# Patient Record
Sex: FEMALE | Race: WHITE | URBAN - METROPOLITAN AREA
[De-identification: names, ages, dates, MRNs, and addresses within clinical notes are randomized per-mention and may not be internally consistent; named-entity substitution may affect disease eponyms.]

---

## 2019-09-25 ENCOUNTER — INPATIENT (INPATIENT)
Facility: HOSPITAL | Age: 69
LOS: 6 days | Discharge: NURSING, REHAB OR LONG-TERM CARE | End: 2019-10-02
Attending: INTERNAL MEDICINE | Admitting: INTERNAL MEDICINE
Payer: COMMERCIAL

## 2019-09-25 VITALS
HEART RATE: 72 BPM | RESPIRATION RATE: 18 BRPM | DIASTOLIC BLOOD PRESSURE: 96 MMHG | WEIGHT: 250 LBS | SYSTOLIC BLOOD PRESSURE: 206 MMHG | OXYGEN SATURATION: 98 % | TEMPERATURE: 100 F

## 2019-09-25 LAB
ALBUMIN SERPL ELPH-MCNC: 3.9 G/DL — SIGNIFICANT CHANGE UP (ref 3.5–5.2)
ALP SERPL-CCNC: 89 U/L — SIGNIFICANT CHANGE UP (ref 30–115)
ALT FLD-CCNC: 16 U/L — SIGNIFICANT CHANGE UP (ref 0–41)
ANION GAP SERPL CALC-SCNC: 10 MMOL/L — SIGNIFICANT CHANGE UP (ref 7–14)
APTT BLD: 37.5 SEC — SIGNIFICANT CHANGE UP (ref 27–39.2)
AST SERPL-CCNC: 16 U/L — SIGNIFICANT CHANGE UP (ref 0–41)
BASE EXCESS BLDV CALC-SCNC: 3.4 MMOL/L — HIGH (ref -2–2)
BASOPHILS # BLD AUTO: 0.06 K/UL — SIGNIFICANT CHANGE UP (ref 0–0.2)
BASOPHILS NFR BLD AUTO: 0.6 % — SIGNIFICANT CHANGE UP (ref 0–1)
BILIRUB SERPL-MCNC: 1.2 MG/DL — SIGNIFICANT CHANGE UP (ref 0.2–1.2)
BUN SERPL-MCNC: 11 MG/DL — SIGNIFICANT CHANGE UP (ref 10–20)
CA-I SERPL-SCNC: 1.3 MMOL/L — SIGNIFICANT CHANGE UP (ref 1.12–1.3)
CALCIUM SERPL-MCNC: 9.8 MG/DL — SIGNIFICANT CHANGE UP (ref 8.5–10.1)
CHLORIDE SERPL-SCNC: 106 MMOL/L — SIGNIFICANT CHANGE UP (ref 98–110)
CO2 SERPL-SCNC: 27 MMOL/L — SIGNIFICANT CHANGE UP (ref 17–32)
CREAT SERPL-MCNC: 0.7 MG/DL — SIGNIFICANT CHANGE UP (ref 0.7–1.5)
EOSINOPHIL # BLD AUTO: 0.26 K/UL — SIGNIFICANT CHANGE UP (ref 0–0.7)
EOSINOPHIL NFR BLD AUTO: 2.8 % — SIGNIFICANT CHANGE UP (ref 0–8)
GAS PNL BLDV: 144 MMOL/L — SIGNIFICANT CHANGE UP (ref 136–145)
GAS PNL BLDV: SIGNIFICANT CHANGE UP
GLUCOSE SERPL-MCNC: 133 MG/DL — HIGH (ref 70–99)
HCO3 BLDV-SCNC: 29 MMOL/L — SIGNIFICANT CHANGE UP (ref 22–29)
HCT VFR BLD CALC: 37.4 % — SIGNIFICANT CHANGE UP (ref 37–47)
HCT VFR BLDA CALC: 36.7 % — SIGNIFICANT CHANGE UP (ref 34–44)
HGB BLD CALC-MCNC: 12 G/DL — LOW (ref 14–18)
HGB BLD-MCNC: 11.5 G/DL — LOW (ref 12–16)
HOROWITZ INDEX BLDV+IHG-RTO: 21 — SIGNIFICANT CHANGE UP
IMM GRANULOCYTES NFR BLD AUTO: 0.4 % — HIGH (ref 0.1–0.3)
INR BLD: 1.41 RATIO — HIGH (ref 0.65–1.3)
LACTATE BLDV-MCNC: 1.6 MMOL/L — SIGNIFICANT CHANGE UP (ref 0.5–1.6)
LYMPHOCYTES # BLD AUTO: 1.48 K/UL — SIGNIFICANT CHANGE UP (ref 1.2–3.4)
LYMPHOCYTES # BLD AUTO: 15.7 % — LOW (ref 20.5–51.1)
MCHC RBC-ENTMCNC: 26 PG — LOW (ref 27–31)
MCHC RBC-ENTMCNC: 30.7 G/DL — LOW (ref 32–37)
MCV RBC AUTO: 84.6 FL — SIGNIFICANT CHANGE UP (ref 81–99)
MONOCYTES # BLD AUTO: 0.58 K/UL — SIGNIFICANT CHANGE UP (ref 0.1–0.6)
MONOCYTES NFR BLD AUTO: 6.1 % — SIGNIFICANT CHANGE UP (ref 1.7–9.3)
NEUTROPHILS # BLD AUTO: 7.03 K/UL — HIGH (ref 1.4–6.5)
NEUTROPHILS NFR BLD AUTO: 74.4 % — SIGNIFICANT CHANGE UP (ref 42.2–75.2)
NRBC # BLD: 0 /100 WBCS — SIGNIFICANT CHANGE UP (ref 0–0)
PCO2 BLDV: 49 MMHG — SIGNIFICANT CHANGE UP (ref 41–51)
PH BLDV: 7.38 — SIGNIFICANT CHANGE UP (ref 7.26–7.43)
PLATELET # BLD AUTO: 233 K/UL — SIGNIFICANT CHANGE UP (ref 130–400)
PO2 BLDV: 41 MMHG — HIGH (ref 20–40)
POTASSIUM BLDV-SCNC: 3.7 MMOL/L — SIGNIFICANT CHANGE UP (ref 3.3–5.6)
POTASSIUM SERPL-MCNC: 3.8 MMOL/L — SIGNIFICANT CHANGE UP (ref 3.5–5)
POTASSIUM SERPL-SCNC: 3.8 MMOL/L — SIGNIFICANT CHANGE UP (ref 3.5–5)
PROT SERPL-MCNC: 6.4 G/DL — SIGNIFICANT CHANGE UP (ref 6–8)
PROTHROM AB SERPL-ACNC: 16.2 SEC — HIGH (ref 9.95–12.87)
RBC # BLD: 4.42 M/UL — SIGNIFICANT CHANGE UP (ref 4.2–5.4)
RBC # FLD: 14.3 % — SIGNIFICANT CHANGE UP (ref 11.5–14.5)
SAO2 % BLDV: 73 % — SIGNIFICANT CHANGE UP
SODIUM SERPL-SCNC: 143 MMOL/L — SIGNIFICANT CHANGE UP (ref 135–146)
WBC # BLD: 9.45 K/UL — SIGNIFICANT CHANGE UP (ref 4.8–10.8)
WBC # FLD AUTO: 9.45 K/UL — SIGNIFICANT CHANGE UP (ref 4.8–10.8)

## 2019-09-25 PROCEDURE — 99285 EMERGENCY DEPT VISIT HI MDM: CPT

## 2019-09-25 RX ORDER — SODIUM CHLORIDE 9 MG/ML
1000 INJECTION INTRAMUSCULAR; INTRAVENOUS; SUBCUTANEOUS ONCE
Refills: 0 | Status: COMPLETED | OUTPATIENT
Start: 2019-09-25 | End: 2019-09-25

## 2019-09-25 RX ORDER — PIPERACILLIN AND TAZOBACTAM 4; .5 G/20ML; G/20ML
3.38 INJECTION, POWDER, LYOPHILIZED, FOR SOLUTION INTRAVENOUS ONCE
Refills: 0 | Status: COMPLETED | OUTPATIENT
Start: 2019-09-25 | End: 2019-09-25

## 2019-09-25 RX ADMIN — SODIUM CHLORIDE 1000 MILLILITER(S): 9 INJECTION INTRAMUSCULAR; INTRAVENOUS; SUBCUTANEOUS at 22:14

## 2019-09-25 RX ADMIN — PIPERACILLIN AND TAZOBACTAM 200 GRAM(S): 4; .5 INJECTION, POWDER, LYOPHILIZED, FOR SOLUTION INTRAVENOUS at 22:58

## 2019-09-25 NOTE — ED ADULT TRIAGE NOTE - CHIEF COMPLAINT QUOTE
BIBA via [facility] from home, pt states hernia repair in 2011 on right side of abdomen, wound did not heal, repeat surgery in 2013 because it didn't heal, pt states wounds never healed since 2013, denies fever. Son states that she lives alone, son noticed that he noticed the area and she has been hiding it from family because of fear of further treatment to area.

## 2019-09-26 DIAGNOSIS — Z90.49 ACQUIRED ABSENCE OF OTHER SPECIFIED PARTS OF DIGESTIVE TRACT: Chronic | ICD-10-CM

## 2019-09-26 DIAGNOSIS — Z98.890 OTHER SPECIFIED POSTPROCEDURAL STATES: Chronic | ICD-10-CM

## 2019-09-26 DIAGNOSIS — K63.2 FISTULA OF INTESTINE: ICD-10-CM

## 2019-09-26 PROCEDURE — 99223 1ST HOSP IP/OBS HIGH 75: CPT | Mod: AI

## 2019-09-26 PROCEDURE — 74177 CT ABD & PELVIS W/CONTRAST: CPT | Mod: 26

## 2019-09-26 RX ORDER — AMLODIPINE BESYLATE 2.5 MG/1
5 TABLET ORAL ONCE
Refills: 0 | Status: COMPLETED | OUTPATIENT
Start: 2019-09-26 | End: 2019-09-26

## 2019-09-26 RX ORDER — INFLUENZA VIRUS VACCINE 15; 15; 15; 15 UG/.5ML; UG/.5ML; UG/.5ML; UG/.5ML
0.5 SUSPENSION INTRAMUSCULAR ONCE
Refills: 0 | Status: COMPLETED | OUTPATIENT
Start: 2019-09-26 | End: 2019-10-02

## 2019-09-26 RX ORDER — METOPROLOL TARTRATE 50 MG
50 TABLET ORAL DAILY
Refills: 0 | Status: DISCONTINUED | OUTPATIENT
Start: 2019-09-26 | End: 2019-09-29

## 2019-09-26 RX ORDER — AMLODIPINE BESYLATE 2.5 MG/1
5 TABLET ORAL DAILY
Refills: 0 | Status: DISCONTINUED | OUTPATIENT
Start: 2019-09-26 | End: 2019-09-26

## 2019-09-26 RX ORDER — HYDROCHLOROTHIAZIDE 25 MG
12.5 TABLET ORAL DAILY
Refills: 0 | Status: DISCONTINUED | OUTPATIENT
Start: 2019-09-26 | End: 2019-09-30

## 2019-09-26 RX ORDER — RIVAROXABAN 15 MG-20MG
20 KIT ORAL
Refills: 0 | Status: DISCONTINUED | OUTPATIENT
Start: 2019-09-26 | End: 2019-09-29

## 2019-09-26 RX ORDER — ACETAMINOPHEN 500 MG
650 TABLET ORAL EVERY 6 HOURS
Refills: 0 | Status: DISCONTINUED | OUTPATIENT
Start: 2019-09-26 | End: 2019-09-30

## 2019-09-26 RX ORDER — AMLODIPINE BESYLATE 2.5 MG/1
10 TABLET ORAL DAILY
Refills: 0 | Status: DISCONTINUED | OUTPATIENT
Start: 2019-09-26 | End: 2019-09-27

## 2019-09-26 RX ORDER — PANTOPRAZOLE SODIUM 20 MG/1
40 TABLET, DELAYED RELEASE ORAL
Refills: 0 | Status: DISCONTINUED | OUTPATIENT
Start: 2019-09-26 | End: 2019-09-30

## 2019-09-26 RX ADMIN — Medication 650 MILLIGRAM(S): at 14:06

## 2019-09-26 RX ADMIN — Medication 12.5 MILLIGRAM(S): at 14:07

## 2019-09-26 RX ADMIN — Medication 50 MILLIGRAM(S): at 14:07

## 2019-09-26 RX ADMIN — AMLODIPINE BESYLATE 5 MILLIGRAM(S): 2.5 TABLET ORAL at 14:07

## 2019-09-26 RX ADMIN — Medication 650 MILLIGRAM(S): at 12:32

## 2019-09-26 RX ADMIN — AMLODIPINE BESYLATE 5 MILLIGRAM(S): 2.5 TABLET ORAL at 20:32

## 2019-09-26 RX ADMIN — RIVAROXABAN 20 MILLIGRAM(S): KIT at 18:08

## 2019-09-26 NOTE — H&P ADULT - ASSESSMENT
A/P:    1. Right flank open wound, infected  -Possibly the beginning of a enterocutaneous fistula  -Surgery consult   -IV ABXs (Flagyl/Cipro)  -Wound care    2. Elevated BP  -The pt states she was never diagnosed with HTN  -Start Amlodipine 5 mg Daily     GI PPX  DVT PPX: Hold

## 2019-09-26 NOTE — ED PROVIDER NOTE - NS ED ROS FT
Constitutional: no fever, chills, no recent weight loss, change in appetite or malaise  Eyes: no redness/discharge/pain/vision changes  ENT: no rhinorrhea/ear pain/sore throat  Cardiac: No chest pain, SOB or edema.  Respiratory: No cough or respiratory distress  GI: No nausea, vomiting, diarrhea or abdominal pain.  : No dysuria, frequency, urgency or hematuria  MS: no pain to back or extremities, no loss of ROM, no weakness  Neuro: No headache or weakness. No LOC.  Skin: see HPI  Endocrine: + diabetes.

## 2019-09-26 NOTE — H&P ADULT - NSHPLABSRESULTS_GEN_ALL_CORE
11.5   9.45  )-----------( 233      ( 25 Sep 2019 22:15 )             37.4     09-25    143  |  106  |  11  ----------------------------<  133<H>  3.8   |  27  |  0.7    Ca    9.8      25 Sep 2019 22:15    TPro  6.4  /  Alb  3.9  /  TBili  1.2  /  DBili  x   /  AST  16  /  ALT  16  /  AlkPhos  89  09-25      PT/INR - ( 25 Sep 2019 22:15 )   PT: 16.20 sec;   INR: 1.41 ratio    PTT - ( 25 Sep 2019 22:15 )  PTT:37.5 sec      EXAM:  CT ABDOMEN AND PELVIS IC       PROCEDURE DATE:  09/26/2019      IMPRESSION:     Inflammatory changes noted along the right anterior abdomen and right   flank. 3.1 cm more focal area of fluid with enhancing along the right   flank which may represent an early abscess.    Loop of colon closely adjacent with the previously noted inflammatory   changes with suggestion of a possible enterocutaneous fistula along the   right flank.

## 2019-09-26 NOTE — H&P ADULT - NSHPSOCIALHISTORY_GEN_ALL_CORE
SHX:  -Denies any tobacco, EtOH, or drug use  -Lives at home with family  -Ambulates without cane/walker at home

## 2019-09-26 NOTE — H&P ADULT - NSHPPHYSICALEXAM_GEN_ALL_CORE
Vital Signs Last 24 Hrs  T(C): 37.9 (25 Sep 2019 21:24), Max: 37.9 (25 Sep 2019 21:24)  T(F): 100.2 (25 Sep 2019 21:24), Max: 100.2 (25 Sep 2019 21:24)  HR: 72 (25 Sep 2019 20:29) (72 - 72)  BP: 206/96 (25 Sep 2019 20:29) (206/96 - 206/96)  BP(mean): --  RR: 18 (25 Sep 2019 20:29) (18 - 18)  SpO2: 98% (25 Sep 2019 20:29) (98% - 98%)    General: WN/WD NAD  Neurology: A&Ox3, nonfocal, SEPULVEDA x 4  Eyes: PERRL/EOMI  ENT/Neck: Neck supple, trachea midline, No JVD  Respiratory: CTA B/L, No wheezing, rales, rhonchi  CV: RRR, S1S2, No M/G/R  Abdominal: Right flank with erythema and redness. Open wound that seems infected. + purulence  Extremities: No edema, + peripheral pulses  Skin: No Rashes, Hematoma, Ecchymosis

## 2019-09-26 NOTE — ED PROVIDER NOTE - PHYSICAL EXAMINATION
CONSTITUTIONAL: chronic ill female; in no apparent distress.   EYES: PERRL; EOM intact.   CARDIOVASCULAR: Normal S1, S2; no murmurs, rubs, or gallops.   RESPIRATORY: Normal chest excursion with respiration; breath sounds clear and equal bilaterally; no wheezes, rhonchi, or rales.  GI/: obese round and soft abdomen. Normal bowel sounds; non-distended; non-tender; no palpable organomegaly.   MS: No evidence of trauma or deformity.   SKIN:+ 2 wounds (small 1 cm) noted on RUQ region and 1 wound (small 0.5cm) noted from the middle of chest with purulent drainage and surrounding erythema. + induration around the RUQ wounds with ttp.   NEURO/PSYCH: A & O x 4; grossly unremarkable.

## 2019-09-26 NOTE — CONSULT NOTE ADULT - SUBJECTIVE AND OBJECTIVE BOX
67 yo female hx of DM/HTN/obesity/hx of hernia repair in 2011 and revised in 2013 ([place], [place]) present c/o wound check. as per son, the wound hasn't been healing right since 2013. Patient didn't want to get it check over the years. son brought her to ED for evaluation 2/2 purulent drainage from wound and increasing pain and new draining site her chest and above the original wound so he decided to bring patient to Ed for evaluation. Denies fever/chill/HA/dizziness/chest pain/palpitation/sob/abd pain/n/v/d/ black stool/bloody stool/urinary sxs    Vital Signs Last 24 Hrs  T(C): 37.9 (25 Sep 2019 21:24), Max: 37.9 (25 Sep 2019 21:24)  T(F): 100.2 (25 Sep 2019 21:24), Max: 100.2 (25 Sep 2019 21:24)  HR: 80 (25 Sep 2019 22:11) (72 - 80)  BP: 160/94 (25 Sep 2019 22:11) (160/94 - 206/96)  BP(mean): --  RR: 18 (25 Sep 2019 22:11) (18 - 18)  SpO2: 98% (25 Sep 2019 22:11) (98% - 98%)    PHYSICAL EXAM:      Constitutional: NAD, A&O x3    Eyes: PERRLA, no conjuctivitis    Neck: no lymphadenopathy    Respiratory: +air entry, no rales, no rhonchi, no wheezes    Cardiovascular: +S1 and S2, regular rate and rhythm    Gastrointestinal: +BS, soft, non-tender, not distended    Extremities:  no edema, no calf tenderness      Neurological: sensation intact, ROM equal B/L, CN II-XII intact    Skin: no rashes, normal turgor                              11.5   9.45  )-----------( 233      ( 25 Sep 2019 22:15 )             37.4     09-25    143  |  106  |  11  ----------------------------<  133<H>  3.8   |  27  |  0.7    Ca    9.8      25 Sep 2019 22:15    TPro  6.4  /  Alb  3.9  /  TBili  1.2  /  DBili  x   /  AST  16  /  ALT  16  /  AlkPhos  89  09-25            PT/INR - ( 25 Sep 2019 22:15 )   PT: 16.20 sec;   INR: 1.41 ratio         PTT - ( 25 Sep 2019 22:15 )  PTT:37.5 sec      CAPILLARY BLOOD GLUCOSE        < from: CT Abdomen and Pelvis w/ IV Cont (09.26.19 @ 02:00) >  IMPRESSION:     Inflammatory changes noted along the right anterior abdomen and right   flank. 3.1 cm more focal area of fluid with enhancing along the right   flank which may represent an early abscess.    Loop of colon closely adjacent with the previously noted inflammatory   changes with suggestion of a possible enterocutaneous fistula along the   right flank.    < end of copied text > 67 yo female hx of DM, HTN, A- fib on xarelto, obesity, hx of hernia repair in 2011 and revised in 2013 ([place], [place]) present c/o wound check. as per son, the wound hasn't been healing right since 2013. Patient didn't want to get it check over the years. son brought her to ED for evaluation 2/2 purulent drainage from wound and increasing pain and new draining site her chest and above the original wound so he decided to bring patient to Ed for evaluation. Denies fever/chill/HA/dizziness/chest pain/palpitation/sob/abd pain/n/v/d/ black stool/bloody stool/urinary sxs    Vital Signs Last 24 Hrs  T(C): 37.9 (25 Sep 2019 21:24), Max: 37.9 (25 Sep 2019 21:24)  T(F): 100.2 (25 Sep 2019 21:24), Max: 100.2 (25 Sep 2019 21:24)  HR: 80 (25 Sep 2019 22:11) (72 - 80)  BP: 160/94 (25 Sep 2019 22:11) (160/94 - 206/96)  BP(mean): --  RR: 18 (25 Sep 2019 22:11) (18 - 18)  SpO2: 98% (25 Sep 2019 22:11) (98% - 98%)    PHYSICAL EXAM:      Constitutional: NAD, A&O x3    Eyes: PERRLA, no conjuctivitis    Neck: no lymphadenopathy    Respiratory: +air entry, no rales, no rhonchi, no wheezes    Cardiovascular: +S1 and S2, regular rate and rhythm    Gastrointestinal: +BS, soft, non-tender, not distended    Extremities:  no edema, no calf tenderness      Neurological: sensation intact, ROM equal B/L, CN II-XII intact    Skin: no rashes, normal turgor                              11.5   9.45  )-----------( 233      ( 25 Sep 2019 22:15 )             37.4     09-25    143  |  106  |  11  ----------------------------<  133<H>  3.8   |  27  |  0.7    Ca    9.8      25 Sep 2019 22:15    TPro  6.4  /  Alb  3.9  /  TBili  1.2  /  DBili  x   /  AST  16  /  ALT  16  /  AlkPhos  89  09-25            PT/INR - ( 25 Sep 2019 22:15 )   PT: 16.20 sec;   INR: 1.41 ratio         PTT - ( 25 Sep 2019 22:15 )  PTT:37.5 sec      CAPILLARY BLOOD GLUCOSE        < from: CT Abdomen and Pelvis w/ IV Cont (09.26.19 @ 02:00) >  IMPRESSION:     Inflammatory changes noted along the right anterior abdomen and right   flank. 3.1 cm more focal area of fluid with enhancing along the right   flank which may represent an early abscess.    Loop of colon closely adjacent with the previously noted inflammatory   changes with suggestion of a possible enterocutaneous fistula along the   right flank.    < end of copied text > 69 yo female hx of DM, HTN, A- fib on xarelto, obesity, hx of hernia repair in 2011 and revised in 2013 ([place], [place]) brought to the ED by son for  wound check. As per son, the wound hasn't been healing right since 2013. Patient last saw her surgeon last early 2014 and  didn't want to go back. son brought her to ED for evaluation due purulent drainage from wound. Denies fever/chill/HA/dizziness/chest pain/palpitation/sob/abd pain/n/v/d/ black stool/bloody stool/urinary symptoms.    Vital Signs Last 24 Hrs  T(C): 37.9 (25 Sep 2019 21:24), Max: 37.9 (25 Sep 2019 21:24)  T(F): 100.2 (25 Sep 2019 21:24), Max: 100.2 (25 Sep 2019 21:24)  HR: 80 (25 Sep 2019 22:11) (72 - 80)  BP: 160/94 (25 Sep 2019 22:11) (160/94 - 206/96)  BP(mean): --  RR: 18 (25 Sep 2019 22:11) (18 - 18)  SpO2: 98% (25 Sep 2019 22:11) (98% - 98%)    PHYSICAL EXAM:      Constitutional: NAD, A&O x3    Eyes: PERRLA, no conjuctivitis    Neck: no lymphadenopathy    Respiratory: +air entry, no rales, no rhonchi, no wheezes    Cardiovascular: +S1 and S2, regular rate and rhythm    Gastrointestinal: +BS, soft, (+) 3 openings at right flank and 1 opening at epigastric area with trace of drainage in dressing, (+) indurated area at right flank with redness,  non-tender, non distended    Extremities:  no edema, no calf tenderness      Neurological: sensation intact, ROM equal B/L, CN II-XII intact    Skin: no rashes, normal turgor                              11.5   9.45  )-----------( 233      ( 25 Sep 2019 22:15 )             37.4     09-25    143  |  106  |  11  ----------------------------<  133<H>  3.8   |  27  |  0.7    Ca    9.8      25 Sep 2019 22:15    TPro  6.4  /  Alb  3.9  /  TBili  1.2  /  DBili  x   /  AST  16  /  ALT  16  /  AlkPhos  89  09-25            PT/INR - ( 25 Sep 2019 22:15 )   PT: 16.20 sec;   INR: 1.41 ratio         PTT - ( 25 Sep 2019 22:15 )  PTT:37.5 sec      CAPILLARY BLOOD GLUCOSE        < from: CT Abdomen and Pelvis w/ IV Cont (09.26.19 @ 02:00) >  IMPRESSION:     Inflammatory changes noted along the right anterior abdomen and right   flank. 3.1 cm more focal area of fluid with enhancing along the right   flank which may represent an early abscess.    Loop of colon closely adjacent with the previously noted inflammatory   changes with suggestion of a possible enterocutaneous fistula along the   right flank.    < end of copied text >

## 2019-09-26 NOTE — CONSULT NOTE ADULT - ATTENDING COMMENTS
above noted abdomen soft dressing changed multiple sinus tract discussed case with pt and familly pt fully examined by will need dye study through sinus tracts

## 2019-09-26 NOTE — CHART NOTE - NSCHARTNOTEFT_GEN_A_CORE
Patient seen at bedside - resting comfortably.    Med rec completed on patient this AM. She is feeling better at this time.     Patient without questions or concerns at this time.     Patient encouraged to contact PA with any further questions or concerns.

## 2019-09-26 NOTE — ED PROVIDER NOTE - CLINICAL SUMMARY MEDICAL DECISION MAKING FREE TEXT BOX
68yF pw cellulitis and CT with Inflammatory changes noted along the right anterior abdomen and right   	flank. 3.1 cm more focal area of fluid with enhancing along the right   	flank which may represent an early abscess. Loop of colon closely adjacent with the previously noted inflammatory changes with suggestion of a possible enterocutaneous fistula along the  right flank.  Labs wnl - zosyn given,   surgeyr consulted -  pt admitted

## 2019-09-26 NOTE — ED ADULT NURSE NOTE - CHIEF COMPLAINT QUOTE
BIBA via [facility] from home, pt states hernia repair in 2011 on right side of abdomen, wound did not heal, repeat surgery in 2013 because it didn't heal, pt states wounds never healed since 2013, denies fever. Son states that she lives alone, son noticed that he noticed the area and she has been hiding it from family because of fear of further treatment to area.

## 2019-09-26 NOTE — ED PROVIDER NOTE - OBJECTIVE STATEMENT
69 yo female hx of DM/HTN/obesity/hx of hernia repair in 2011 and revised in 2013 ([place], [place]) present c/o wound check. as per son, patient didn't want to get it check over the years. son brought her to ED for evaluation 2/2 purulent drainage from wound and increasing pain. 69 yo female hx of DM/HTN/obesity/hx of hernia repair in 2011 and revised in 2013 ([place], [place]) present c/o wound check. as per son, the wound hasn't been healing right since 2013. Patient didn't want to get it check over the years. son brought her to ED for evaluation 2/2 purulent drainage from wound and increasing pain and new draining site her chest and above the original wound so he decided to bring patient to Ed for evaluation. Denies fever/chill/HA/dizziness/chest pain/palpitation/sob/abd pain/n/v/d/ black stool/bloody stool/urinary sxs

## 2019-09-26 NOTE — H&P ADULT - HISTORY OF PRESENT ILLNESS
69 YO F with a PMH of hernia repair who presents to the hospital for eval of right-sided flank pain for the past x 5 years. Progressively worsening. 67 YO F with a PMH of hernia repair who presents to the hospital for eval of right-sided flank pain for the past x 5 years. Progressively worsening. Associated with purulent discharge and a redness and swelling over the flank. Of note, the pt states that she had a hernia repair in the same area and that she never followed up with her surgeon because she thought they were making things worse. Denies any fevers, chills, cough, CP, SOB, dysuria, or constipation.     In the ED, the pt had a CT-AP performed which showed a possible enterocutaneous fistula

## 2019-09-26 NOTE — PATIENT PROFILE ADULT - DO YOU FEEL UNSAFE AT SCHOOL?
Pt states that new medication for bladder issues is working well   Pt wanted DR to know    Pt at [phone]   not applicable

## 2019-09-27 LAB
ANION GAP SERPL CALC-SCNC: 10 MMOL/L — SIGNIFICANT CHANGE UP (ref 7–14)
BUN SERPL-MCNC: 8 MG/DL — LOW (ref 10–20)
CALCIUM SERPL-MCNC: 9.8 MG/DL — SIGNIFICANT CHANGE UP (ref 8.5–10.1)
CHLORIDE SERPL-SCNC: 104 MMOL/L — SIGNIFICANT CHANGE UP (ref 98–110)
CO2 SERPL-SCNC: 29 MMOL/L — SIGNIFICANT CHANGE UP (ref 17–32)
CREAT SERPL-MCNC: 0.6 MG/DL — LOW (ref 0.7–1.5)
GLUCOSE BLDC GLUCOMTR-MCNC: 87 MG/DL — SIGNIFICANT CHANGE UP (ref 70–99)
GLUCOSE BLDC GLUCOMTR-MCNC: 98 MG/DL — SIGNIFICANT CHANGE UP (ref 70–99)
GLUCOSE SERPL-MCNC: 96 MG/DL — SIGNIFICANT CHANGE UP (ref 70–99)
HCT VFR BLD CALC: 39.2 % — SIGNIFICANT CHANGE UP (ref 37–47)
HCV AB S/CO SERPL IA: 0.08 S/CO — SIGNIFICANT CHANGE UP (ref 0–0.99)
HCV AB SERPL-IMP: SIGNIFICANT CHANGE UP
HGB BLD-MCNC: 12 G/DL — SIGNIFICANT CHANGE UP (ref 12–16)
INR BLD: 1.79 RATIO — HIGH (ref 0.65–1.3)
MCHC RBC-ENTMCNC: 25.8 PG — LOW (ref 27–31)
MCHC RBC-ENTMCNC: 30.6 G/DL — LOW (ref 32–37)
MCV RBC AUTO: 84.3 FL — SIGNIFICANT CHANGE UP (ref 81–99)
NRBC # BLD: 0 /100 WBCS — SIGNIFICANT CHANGE UP (ref 0–0)
PLATELET # BLD AUTO: 245 K/UL — SIGNIFICANT CHANGE UP (ref 130–400)
POTASSIUM SERPL-MCNC: 3.5 MMOL/L — SIGNIFICANT CHANGE UP (ref 3.5–5)
POTASSIUM SERPL-SCNC: 3.5 MMOL/L — SIGNIFICANT CHANGE UP (ref 3.5–5)
PROTHROM AB SERPL-ACNC: 20.5 SEC — HIGH (ref 9.95–12.87)
RBC # BLD: 4.65 M/UL — SIGNIFICANT CHANGE UP (ref 4.2–5.4)
RBC # FLD: 14.2 % — SIGNIFICANT CHANGE UP (ref 11.5–14.5)
SODIUM SERPL-SCNC: 143 MMOL/L — SIGNIFICANT CHANGE UP (ref 135–146)
WBC # BLD: 10.06 K/UL — SIGNIFICANT CHANGE UP (ref 4.8–10.8)
WBC # FLD AUTO: 10.06 K/UL — SIGNIFICANT CHANGE UP (ref 4.8–10.8)

## 2019-09-27 PROCEDURE — 99233 SBSQ HOSP IP/OBS HIGH 50: CPT

## 2019-09-27 RX ORDER — NIFEDIPINE 30 MG
30 TABLET, EXTENDED RELEASE 24 HR ORAL DAILY
Refills: 0 | Status: DISCONTINUED | OUTPATIENT
Start: 2019-09-28 | End: 2019-09-30

## 2019-09-27 RX ORDER — NIFEDIPINE 30 MG
30 TABLET, EXTENDED RELEASE 24 HR ORAL ONCE
Refills: 0 | Status: COMPLETED | OUTPATIENT
Start: 2019-09-27 | End: 2019-09-27

## 2019-09-27 RX ADMIN — AMLODIPINE BESYLATE 10 MILLIGRAM(S): 2.5 TABLET ORAL at 05:35

## 2019-09-27 RX ADMIN — Medication 12.5 MILLIGRAM(S): at 05:34

## 2019-09-27 RX ADMIN — RIVAROXABAN 20 MILLIGRAM(S): KIT at 19:24

## 2019-09-27 RX ADMIN — PANTOPRAZOLE SODIUM 40 MILLIGRAM(S): 20 TABLET, DELAYED RELEASE ORAL at 06:00

## 2019-09-27 RX ADMIN — Medication 650 MILLIGRAM(S): at 04:14

## 2019-09-27 RX ADMIN — Medication 50 MILLIGRAM(S): at 05:34

## 2019-09-27 RX ADMIN — Medication 30 MILLIGRAM(S): at 17:52

## 2019-09-27 NOTE — PROGRESS NOTE ADULT - SUBJECTIVE AND OBJECTIVE BOX
Interventional Radiology Brief- Operative Note    Procedure: enterocutaneous fistulogram    Pre-Op Diagnosis: Right flank inflammatory changes    Post-Op Diagnosis: Same; no fistula    Attending:   Gabriele    Resident:   none    Anesthesia (type):  [ ] General Anesthesia  [ ] Sedation  [ ] Spinal Anesthesia  [ x] Local/Regional    Contrast:  20ml    Estimated Blood Loss:  < 2ml    Condition:   [ ] Critical  [ ] Serious  [ ] Fair   [x ] Good    Findings/Follow up Plan of Care:    -Catheter cannulated each cutaneous hole and contrast injected into each of them.  No evidence of fistula       Specimens Removed:  none  Implants:  none  Complications:  none  Condition/Disposition:  [facility] to [facility]    Please call Interventional Radiology [phone] with any questions, concerns, or issues.

## 2019-09-27 NOTE — PROGRESS NOTE ADULT - ASSESSMENT
67 YO F with a PMH of hernia repair who presents to the hospital for eval of right-sided flank pain for the past x 5 years. Progressively worsening. Associated with purulent discharge and a redness and swelling over the flank. Of note, the pt states that she had a hernia repair in the same area and that she never followed up with her surgeon because she thought they were making things worse. Denies any fevers, chills, cough, CP, SOB, dysuria, or constipation.     1. Right flank open wound, infected  -? enterocutaneous fistula-- IR did fistulogram-- no fistula noted  - No IV ABXs -Wound cx showed staph aureus  -ID follow up    2. Elevated BP  -patient has not been taking medications for over a month as per son  - changed amlodipine to nifedipine XL 30mg    3. Morbid obesity  patient is noncompliant with drugs and diet

## 2019-09-27 NOTE — PROGRESS NOTE ADULT - SUBJECTIVE AND OBJECTIVE BOX
SUBJECTIVE:    Patient is a 68y old Female who presents with a chief complaint of ABD pain (27 Sep 2019 16:37)    Currently admitted to medicine with the primary diagnosis of Phlegmon     Today is hospital day 1d.     PAST MEDICAL & SURGICAL HISTORY  Obesity  Diabetes mellitus  HTN (hypertension)  History of cholecystectomy  H/O hernia repair: 2011 and revised in 2013    ALLERGIES:  No Known Allergies    MEDICATIONS:  STANDING MEDICATIONS  hydrochlorothiazide 12.5 milliGRAM(s) Oral daily  influenza   Vaccine 0.5 milliLiter(s) IntraMuscular once  metoprolol succinate ER 50 milliGRAM(s) Oral daily  NIFEdipine XL 30 milliGRAM(s) Oral once  pantoprazole    Tablet 40 milliGRAM(s) Oral before breakfast  rivaroxaban 20 milliGRAM(s) Oral with dinner    PRN MEDICATIONS  acetaminophen   Tablet .. 650 milliGRAM(s) Oral every 6 hours PRN    VITALS:   T(F): 97.3  HR: 65  BP: 177/76  RR: 18  SpO2: --    LABS:                        12.0   10.06 )-----------( 245      ( 27 Sep 2019 07:37 )             39.2     09-27    143  |  104  |  8<L>  ----------------------------<  96  3.5   |  29  |  0.6<L>    Ca    9.8      27 Sep 2019 07:37    TPro  6.4  /  Alb  3.9  /  TBili  1.2  /  DBili  x   /  AST  16  /  ALT  16  /  AlkPhos  89  09-25    PT/INR - ( 27 Sep 2019 07:37 )   PT: 20.50 sec;   INR: 1.79 ratio         PTT - ( 25 Sep 2019 22:15 )  PTT:37.5 sec          Culture - Other (collected 26 Sep 2019 14:10)  Source: .Other wound  Preliminary Report (27 Sep 2019 16:38):    Culture yields growth of greater than 3 colony types of    bacteria,  which may indicate contamination and normal rosalva    Call client services within 7 days if further workup is clinically    indicated.    Culture includes    Few Staphylococcus aureus          RADIOLOGY:    PHYSICAL EXAM:  GEN: No acute distress  LUNGS: Clear to auscultation bilaterally   HEART: S1/S2 present. RRR.   ABD/ GI: Soft, non-tender, rt sided wound with dressing in place  EXT: NC/NC/NE/2+PP/SEPULVEDA  NEURO: AAOX3

## 2019-09-27 NOTE — PHYSICAL THERAPY INITIAL EVALUATION ADULT - GAIT DEVIATIONS NOTED, PT EVAL
forward flexed trunk, flexed hips, decreased heel strike / push off/decreased skyla/increased time in double stance/decreased step length/decreased weight-shifting ability

## 2019-09-27 NOTE — CONSULT NOTE ADULT - SUBJECTIVE AND OBJECTIVE BOX
INTERVENTIONAL RADIOLOGY CONSULT:     Procedure Requested: Enterocutaneous Fistula     HPI:  67 YO F with a PMH of hernia repair who presents to the hospital for eval of right-sided flank pain for the past x 5 years. Progressively worsening. Associated with purulent discharge and a redness and swelling over the flank. Of note, the pt states that she had a hernia repair in the same area and that she never followed up with her surgeon because she thought they were making things worse. Denies any fevers, chills, cough, CP, SOB, dysuria, or constipation.     In the ED, the pt had a CT-AP performed which showed a possible enterocutaneous fistula (26 Sep 2019 05:36)    PAST MEDICAL & SURGICAL HISTORY:  Obesity  Diabetes mellitus  HTN (hypertension)  History of cholecystectomy  H/O hernia repair: 2011 and revised in 2013    MEDICATIONS  (STANDING):  amLODIPine   Tablet 10 milliGRAM(s) Oral daily  hydrochlorothiazide 12.5 milliGRAM(s) Oral daily  influenza   Vaccine 0.5 milliLiter(s) IntraMuscular once  metoprolol succinate ER 50 milliGRAM(s) Oral daily  pantoprazole    Tablet 40 milliGRAM(s) Oral before breakfast  rivaroxaban 20 milliGRAM(s) Oral with dinner    MEDICATIONS  (PRN):  acetaminophen   Tablet .. 650 milliGRAM(s) Oral every 6 hours PRN Mild Pain (1 - 3)      Allergies    No Known Allergies    FAMILY HISTORY:    Physical Exam:   Vital Signs Last 24 Hrs  T(C): 36.3 (27 Sep 2019 06:04), Max: 36.8 (26 Sep 2019 14:08)  T(F): 97.3 (27 Sep 2019 06:04), Max: 98.2 (26 Sep 2019 14:08)  HR: 65 (27 Sep 2019 06:04) (52 - 67)  BP: 177/76 (27 Sep 2019 06:04) (173/79 - 194/86)  BP(mean): --  RR: 18 (27 Sep 2019 06:04) (16 - 18)  SpO2: --    Labs:                         12.0   10.06 )-----------( 245      ( 27 Sep 2019 07:37 )             39.2     09-27    143  |  104  |  8<L>  ----------------------------<  96  3.5   |  29  |  0.6<L>    Ca    9.8      27 Sep 2019 07:37    TPro  6.4  /  Alb  3.9  /  TBili  1.2  /  DBili  x   /  AST  16  /  ALT  16  /  AlkPhos  89  09-25    PT/INR - ( 27 Sep 2019 07:37 )   PT: 20.50 sec;   INR: 1.79 ratio         PTT - ( 25 Sep 2019 22:15 )  PTT:37.5 sec    Pertinent labs:                      12.0   10.06 )-----------( 245      ( 27 Sep 2019 07:37 )             39.2       09-27    143  |  104  |  8<L>  ----------------------------<  96  3.5   |  29  |  0.6<L>    Ca    9.8      27 Sep 2019 07:37    TPro  6.4  /  Alb  3.9  /  TBili  1.2  /  DBili  x   /  AST  16  /  ALT  16  /  AlkPhos  89  09-25      PT/INR - ( 27 Sep 2019 07:37 )   PT: 20.50 sec;   INR: 1.79 ratio         PTT - ( 25 Sep 2019 22:15 )  PTT:37.5 sec    Radiology & Additional Studies:   < from: CT Abdomen and Pelvis w/ IV Cont (09.26.19 @ 02:00) >  IMPRESSION:     Inflammatory changes noted along the right anterior abdomen and right   flank. 3.1 cm more focal area of fluid with enhancing along the right   flank which may represent an early abscess.    Loop of colon closely adjacent with the previously noted inflammatory   changes with suggestion of a possible enterocutaneous fistula along the   right flank.    < end of copied text >    Radiology imaging reviewed.       ASSESSMENT/ PLAN:   67 YO F with a PMH of hernia repair who presents to the hospital for eval of right-sided flank pain for the past x 5 years. Progressively worsening. Associated with purulent discharge and a redness and swelling over the flank.  - CT A/P performed yesterday with a possible enterocutaneous fistula  - Images reviewed.  - IR to take patient today for evaluation of R flank under fluoroscopy    Thank you for the courtesy of this consult, please call [phone] with any further questions. INTERVENTIONAL RADIOLOGY CONSULT:     Procedure Requested: Enterocutaneous Fistula     HPI:  67 YO F with a PMH of hernia repair who presents to the hospital for eval of right-sided flank pain for the past x 5 years. Progressively worsening. Associated with purulent discharge and a redness and swelling over the flank. Of note, the pt states that she had a hernia repair in the same area and that she never followed up with her surgeon because she thought they were making things worse. Denies any fevers, chills, cough, CP, SOB, dysuria, or constipation.     In the ED, the pt had a CT-AP performed which showed a possible enterocutaneous fistula (26 Sep 2019 05:36)    PAST MEDICAL & SURGICAL HISTORY:  Obesity  Diabetes mellitus  HTN (hypertension)  History of cholecystectomy  H/O hernia repair: 2011 and revised in 2013    MEDICATIONS  (STANDING):  amLODIPine   Tablet 10 milliGRAM(s) Oral daily  hydrochlorothiazide 12.5 milliGRAM(s) Oral daily  influenza   Vaccine 0.5 milliLiter(s) IntraMuscular once  metoprolol succinate ER 50 milliGRAM(s) Oral daily  pantoprazole    Tablet 40 milliGRAM(s) Oral before breakfast  rivaroxaban 20 milliGRAM(s) Oral with dinner    MEDICATIONS  (PRN):  acetaminophen   Tablet .. 650 milliGRAM(s) Oral every 6 hours PRN Mild Pain (1 - 3)      Allergies    No Known Allergies    FAMILY HISTORY:    Physical Exam:   Vital Signs Last 24 Hrs  T(C): 36.3 (27 Sep 2019 06:04), Max: 36.8 (26 Sep 2019 14:08)  T(F): 97.3 (27 Sep 2019 06:04), Max: 98.2 (26 Sep 2019 14:08)  HR: 65 (27 Sep 2019 06:04) (52 - 67)  BP: 177/76 (27 Sep 2019 06:04) (173/79 - 194/86)  BP(mean): --  RR: 18 (27 Sep 2019 06:04) (16 - 18)  SpO2: --    Labs:                         12.0   10.06 )-----------( 245      ( 27 Sep 2019 07:37 )             39.2     09-27    143  |  104  |  8<L>  ----------------------------<  96  3.5   |  29  |  0.6<L>    Ca    9.8      27 Sep 2019 07:37    TPro  6.4  /  Alb  3.9  /  TBili  1.2  /  DBili  x   /  AST  16  /  ALT  16  /  AlkPhos  89  09-25    PT/INR - ( 27 Sep 2019 07:37 )   PT: 20.50 sec;   INR: 1.79 ratio         PTT - ( 25 Sep 2019 22:15 )  PTT:37.5 sec    Pertinent labs:                      12.0   10.06 )-----------( 245      ( 27 Sep 2019 07:37 )             39.2       09-27    143  |  104  |  8<L>  ----------------------------<  96  3.5   |  29  |  0.6<L>    Ca    9.8      27 Sep 2019 07:37    TPro  6.4  /  Alb  3.9  /  TBili  1.2  /  DBili  x   /  AST  16  /  ALT  16  /  AlkPhos  89  09-25      PT/INR - ( 27 Sep 2019 07:37 )   PT: 20.50 sec;   INR: 1.79 ratio         PTT - ( 25 Sep 2019 22:15 )  PTT:37.5 sec    Radiology & Additional Studies:   < from: CT Abdomen and Pelvis w/ IV Cont (09.26.19 @ 02:00) >  IMPRESSION:     Inflammatory changes noted along the right anterior abdomen and right   flank. 3.1 cm more focal area of fluid with enhancing along the right   flank which may represent an early abscess.    Loop of colon closely adjacent with the previously noted inflammatory   changes with suggestion of a possible enterocutaneous fistula along the   right flank.    < end of copied text >    Radiology imaging reviewed.       ASSESSMENT/ PLAN:   67 YO F with a PMH of hernia repair who presents to the hospital for eval of right-sided flank pain for the past x 5 years. Progressively worsening. Associated with purulent discharge and a redness and swelling over the flank.  - CT A/P performed yesterday with a possible enterocutaneous fistula  - Images reviewed.  - IR to take patient today for evaluation of R flank under fluoroscopy  - Discussed with the medical team Dr. Rivas    Thank you for the courtesy of this consult, please call [phone] with any further questions.

## 2019-09-27 NOTE — PHYSICAL THERAPY INITIAL EVALUATION ADULT - IMPAIRMENTS FOUND, PT EVAL
posture/gait, locomotion, and balance/muscle strength/aerobic capacity/endurance/ergonomics and body mechanics

## 2019-09-27 NOTE — CONSULT NOTE ADULT - SUBJECTIVE AND OBJECTIVE BOX
DARCIEPATRICIA  68y, Female  Allergy: No Known Allergies      CHIEF COMPLAINT: ABD pain (27 Sep 2019 09:13)      HPI:  67 YO F with a PMH of hernia repair who presents to the hospital for eval of right-sided flank pain for the past x 5 years. Progressively worsening. Associated with purulent discharge and a redness and swelling over the flank. Of note, the pt states that she had a hernia repair in the same area and that she never followed up with her surgeon because she thought they were making things worse. Denies any fevers, chills, cough, CP, SOB, dysuria, or constipation.     In the ED, the pt had a CT-AP performed which showed a possible enterocutaneous fistula (26 Sep 2019 05:36)    FAMILY HISTORY:    PAST MEDICAL & SURGICAL HISTORY:  Obesity  Diabetes mellitus  HTN (hypertension)  History of cholecystectomy  H/O hernia repair: 2011 and revised in 2013      Substance Use ( x ) never used  (  ) IVDU (  ) Other:  Tobacco Usage:  (  x ) never smoked   (   ) former smoker   (   ) current smoker   Alcohol Usage: (   ) social  (   ) daily use ( x ) denies  Sexual History: NA      ROS  General: Denies fevers, chills, nightsweats, weight loss  HEENT: Denies headache, rhinorrhea, sore throat, eye pain  CV: Denies CP, palpitations  PULM: Denies SOB, cough  GI: Denies abdominal pain, diarrhea  : Denies dysuria, hematuria  MSK: Denies arthralgias  SKIN: Denies rash   NEURO: Denies paresthesias, weakness  PSYCH: Denies depression    VITALS:  T(F): 97.3, Max: 98.2 (09-26-19 @ 14:08)  HR: 65  BP: 177/76  RR: 18Vital Signs Last 24 Hrs  T(C): 36.3 (27 Sep 2019 06:04), Max: 36.8 (26 Sep 2019 14:08)  T(F): 97.3 (27 Sep 2019 06:04), Max: 98.2 (26 Sep 2019 14:08)  HR: 65 (27 Sep 2019 06:04) (52 - 67)  BP: 177/76 (27 Sep 2019 06:04) (173/79 - 194/86)  BP(mean): --  RR: 18 (27 Sep 2019 06:04) (16 - 18)  SpO2: --    PHYSICAL EXAM:  Gen: NAD, resting in bed  HEENT: Normocephalic, atraumatic  Neck: supple, no lymphadenopathy  CV: Regular rate & regular rhythm  Lungs: decreased BS at bases, no fremitus  Abdomen: Soft, BS present  Ext: Warm, well perfused  Neuro: non focal, awake  Skin: no rash, no erythema    TESTS & MEASUREMENTS:                        12.0   10.06 )-----------( 245      ( 27 Sep 2019 07:37 )             39.2     09-27    143  |  104  |  8<L>  ----------------------------<  96  3.5   |  29  |  0.6<L>    Ca    9.8      27 Sep 2019 07:37    TPro  6.4  /  Alb  3.9  /  TBili  1.2  /  DBili  x   /  AST  16  /  ALT  16  /  AlkPhos  89  09-25    eGFR if Non African American: 95 mL/min/1.73M2 (09-27-19 @ 07:37)  eGFR if African American: 110 mL/min/1.73M2 (09-27-19 @ 07:37)    LIVER FUNCTIONS - ( 25 Sep 2019 22:15 )  Alb: 3.9 g/dL / Pro: 6.4 g/dL / ALK PHOS: 89 U/L / ALT: 16 U/L / AST: 16 U/L / GGT: x                 Blood Gas Venous - Lactate: 1.6 mmoL/L (09-25-19 @ 21:04)      INFECTIOUS DISEASES TESTING      RADIOLOGY & ADDITIONAL TESTS:  I have personally reviewed the last Chest xray  CXR      CT  CT Abdomen and Pelvis w/ IV Cont:   EXAM:  CT ABDOMEN AND PELVIS IC            PROCEDURE DATE:  09/26/2019            INTERPRETATION:  CLINICAL STATEMENT: Abdominal wall abscess.      TECHNIQUE: Contiguous axial CT images were obtained from the lower chest   to the pubic symphysis following administration of 100cc Optiray 320   intravenous contrast.  Oral contrast was not administered.  Reformatted   images in the coronal and sagittal planes were acquired.    COMPARISON CT: None.    OTHER STUDIES USED FOR CORRELATION: None.       FINDINGS:    LOWER CHEST: Bibasilar subsegmental atelectasis.    HEPATOBILIARY: Postcholecystectomy.    SPLEEN: Unremarkable.    PANCREAS: Mildly atrophic.    ADRENAL GLANDS: Indeterminate 2.3 x 1.4 cm left adrenal gland nodule.   Calcification and atrophy of the right adrenal gland.    KIDNEYS: Right renal hypertrophy. No right  hydronephrosis. Congenitally   absent left kidney. History.    ABDOMINOPELVIC NODES: Unremarkable.    PELVIC ORGANS: Calcified uterine fibroid.    PERITONEUM/MESENTERY/BOWEL: There is a loop of colon closely adjacent   with the patient's right anterior abdominal wall and flank inflammatory   changes with possible connection (series 4/127 and series 601/71-72).    BONES/SOFT TISSUES: There are inflammatory changes noted along the right   anterior abdomen with a more defined fluid collection measuring 3.1 x 1.7   x 1.9 cm with enhancement within the subcutaneous soft tissues of the   right flank (series 4/137). Right flank postsurgical changes with   surgical clipsnoted along the right anterior abdominal wall. Additional   area of inflammatory changes within the subcutaneous soft tissues just   inferior to the sternum without evidence of abscess. Fatty atrophy of the   paraspinal musculature. Degenerative changes of the thoracolumbar spine.    OTHER: Atherosclerotic disease of aorta.      IMPRESSION:     Inflammatory changes noted along the right anterior abdomen and right   flank. 3.1 cm more focal area of fluid with enhancing along the right   flank which may represent an early abscess.    Loop of colon closely adjacent with the previously noted inflammatory   changes with suggestion of a possible enterocutaneous fistula along the   right flank.    Dr. Marisel Hendricks discussed preliminary findings with NAYELY BERNARDO on   9/26/2019 3:03 AM with readback.              MARISEL HENDRICKS M.D., RESIDENT RADIOLOGIST  This document has been electronically signed.  MARY ROCA M.D., ATTENDING RADIOLOGIST  This document has been electronically signed. Sep 26 2019  6:31AM             (09-26-19 @ 02:00)      CARDIOLOGY TESTING      MEDICATIONS  amLODIPine   Tablet 10  hydrochlorothiazide 12.5  influenza   Vaccine 0.5  metoprolol succinate ER 50  pantoprazole    Tablet 40  rivaroxaban 20      ANTIBIOTICS:      All available historical data has been reviewed

## 2019-09-27 NOTE — PROGRESS NOTE ADULT - SUBJECTIVE AND OBJECTIVE BOX
Subjective: 67 yo female admitted for evaluation of chronic cutaneous fistulas. Pt has no complaints. Denies pain, f/c            Vital Signs Last 24 Hrs  T(C): 36.3 (27 Sep 2019 06:04), Max: 36.8 (26 Sep 2019 14:08)  T(F): 97.3 (27 Sep 2019 06:04), Max: 98.2 (26 Sep 2019 14:08)  HR: 65 (27 Sep 2019 06:04) (52 - 67)  BP: 177/76 (27 Sep 2019 06:04) (173/79 - 194/86)  BP(mean): --  RR: 18 (27 Sep 2019 06:04) (16 - 18)  SpO2: --    General Appearance: Appears well, NAD  Neck: Supple  Chest: Equal expansion bilaterally, equal breath sounds  CV: Pulse regular presently  Abdomen: Soft, NT/ND,+bs,  no rebound/gaurding  3 fistulas right lateral anterior abdomen with some greenish drainage  1 epigastric fistula without drainage  Extremities: Grossly symmetric, no calf tend b/l        I&O's Summary    26 Sep 2019 07:01  -  27 Sep 2019 07:00  --------------------------------------------------------  IN: 0 mL / OUT: 600 mL / NET: -600 mL      I&O's Detail    26 Sep 2019 07:01  -  27 Sep 2019 07:00  --------------------------------------------------------  IN:  Total IN: 0 mL    OUT:    Voided: 600 mL  Total OUT: 600 mL    Total NET: -600 mL          MEDICATIONS  (STANDING):  amLODIPine   Tablet 10 milliGRAM(s) Oral daily  hydrochlorothiazide 12.5 milliGRAM(s) Oral daily  influenza   Vaccine 0.5 milliLiter(s) IntraMuscular once  metoprolol succinate ER 50 milliGRAM(s) Oral daily  pantoprazole    Tablet 40 milliGRAM(s) Oral before breakfast  rivaroxaban 20 milliGRAM(s) Oral with dinner    MEDICATIONS  (PRN):  acetaminophen   Tablet .. 650 milliGRAM(s) Oral every 6 hours PRN Mild Pain (1 - 3)      LABS:                        12.0   10.06 )-----------( 245      ( 27 Sep 2019 07:37 )             39.2     09-27    143  |  104  |  8<L>  ----------------------------<  96  3.5   |  29  |  0.6<L>    Ca    9.8      27 Sep 2019 07:37    TPro  6.4  /  Alb  3.9  /  TBili  1.2  /  DBili  x   /  AST  16  /  ALT  16  /  AlkPhos  89  09-25    PT/INR - ( 27 Sep 2019 07:37 )   PT: 20.50 sec;   INR: 1.79 ratio         PTT - ( 25 Sep 2019 22:15 )  PTT:37.5 sec      RADIOLOGY & ADDITIONAL STUDIES: fistulogram p

## 2019-09-27 NOTE — PROGRESS NOTE ADULT - ASSESSMENT
69 yo female with chronic cutaneous fistulas.    Plan:  - fistulogram today (d/w IR)  - will follow    D/W Dr Banda and surgical team

## 2019-09-28 LAB
-  AMPICILLIN/SULBACTAM: SIGNIFICANT CHANGE UP
-  AMPICILLIN/SULBACTAM: SIGNIFICANT CHANGE UP
-  CEFAZOLIN: SIGNIFICANT CHANGE UP
-  CEFAZOLIN: SIGNIFICANT CHANGE UP
-  CLINDAMYCIN: SIGNIFICANT CHANGE UP
-  CLINDAMYCIN: SIGNIFICANT CHANGE UP
-  ERYTHROMYCIN: SIGNIFICANT CHANGE UP
-  ERYTHROMYCIN: SIGNIFICANT CHANGE UP
-  GENTAMICIN: SIGNIFICANT CHANGE UP
-  GENTAMICIN: SIGNIFICANT CHANGE UP
-  OXACILLIN: SIGNIFICANT CHANGE UP
-  OXACILLIN: SIGNIFICANT CHANGE UP
-  RIFAMPIN: SIGNIFICANT CHANGE UP
-  RIFAMPIN: SIGNIFICANT CHANGE UP
-  TETRACYCLINE: SIGNIFICANT CHANGE UP
-  TETRACYCLINE: SIGNIFICANT CHANGE UP
-  TRIMETHOPRIM/SULFAMETHOXAZOLE: SIGNIFICANT CHANGE UP
-  TRIMETHOPRIM/SULFAMETHOXAZOLE: SIGNIFICANT CHANGE UP
-  VANCOMYCIN: SIGNIFICANT CHANGE UP
-  VANCOMYCIN: SIGNIFICANT CHANGE UP
CULTURE RESULTS: SIGNIFICANT CHANGE UP
CULTURE RESULTS: SIGNIFICANT CHANGE UP
HCT VFR BLD CALC: 38.7 % — SIGNIFICANT CHANGE UP (ref 37–47)
HGB BLD-MCNC: 12.2 G/DL — SIGNIFICANT CHANGE UP (ref 12–16)
MCHC RBC-ENTMCNC: 26.5 PG — LOW (ref 27–31)
MCHC RBC-ENTMCNC: 31.5 G/DL — LOW (ref 32–37)
MCV RBC AUTO: 83.9 FL — SIGNIFICANT CHANGE UP (ref 81–99)
METHOD TYPE: SIGNIFICANT CHANGE UP
METHOD TYPE: SIGNIFICANT CHANGE UP
NRBC # BLD: 0 /100 WBCS — SIGNIFICANT CHANGE UP (ref 0–0)
ORGANISM # SPEC MICROSCOPIC CNT: SIGNIFICANT CHANGE UP
PLATELET # BLD AUTO: 255 K/UL — SIGNIFICANT CHANGE UP (ref 130–400)
RBC # BLD: 4.61 M/UL — SIGNIFICANT CHANGE UP (ref 4.2–5.4)
RBC # FLD: 14.2 % — SIGNIFICANT CHANGE UP (ref 11.5–14.5)
SPECIMEN SOURCE: SIGNIFICANT CHANGE UP
SPECIMEN SOURCE: SIGNIFICANT CHANGE UP
WBC # BLD: 8.94 K/UL — SIGNIFICANT CHANGE UP (ref 4.8–10.8)
WBC # FLD AUTO: 8.94 K/UL — SIGNIFICANT CHANGE UP (ref 4.8–10.8)

## 2019-09-28 PROCEDURE — 99233 SBSQ HOSP IP/OBS HIGH 50: CPT

## 2019-09-28 RX ORDER — OXYCODONE AND ACETAMINOPHEN 5; 325 MG/1; MG/1
1 TABLET ORAL ONCE
Refills: 0 | Status: DISCONTINUED | OUTPATIENT
Start: 2019-09-28 | End: 2019-09-28

## 2019-09-28 RX ADMIN — Medication 650 MILLIGRAM(S): at 05:31

## 2019-09-28 RX ADMIN — RIVAROXABAN 20 MILLIGRAM(S): KIT at 18:12

## 2019-09-28 RX ADMIN — OXYCODONE AND ACETAMINOPHEN 1 TABLET(S): 5; 325 TABLET ORAL at 13:11

## 2019-09-28 RX ADMIN — Medication 12.5 MILLIGRAM(S): at 05:30

## 2019-09-28 RX ADMIN — Medication 650 MILLIGRAM(S): at 06:24

## 2019-09-28 RX ADMIN — OXYCODONE AND ACETAMINOPHEN 1 TABLET(S): 5; 325 TABLET ORAL at 13:32

## 2019-09-28 RX ADMIN — Medication 30 MILLIGRAM(S): at 05:30

## 2019-09-28 RX ADMIN — Medication 50 MILLIGRAM(S): at 05:31

## 2019-09-28 RX ADMIN — PANTOPRAZOLE SODIUM 40 MILLIGRAM(S): 20 TABLET, DELAYED RELEASE ORAL at 05:31

## 2019-09-28 NOTE — PROGRESS NOTE ADULT - ASSESSMENT
67 yo female with draining abdominal wounds    Plan:  - OR monday for I &D  - NPO p MN sunday night  - will follow    D/W Dr Banda and surgical team

## 2019-09-28 NOTE — PROGRESS NOTE ADULT - SUBJECTIVE AND OBJECTIVE BOX
SUBJECTIVE:    Patient is a 68y old Female who presents with a chief complaint of ABD pain (28 Sep 2019 10:03)    Currently admitted to medicine with the primary diagnosis of Phlegmon     Today is hospital day 2d.     PAST MEDICAL & SURGICAL HISTORY  Obesity  Diabetes mellitus  HTN (hypertension)  History of cholecystectomy  H/O hernia repair: 2011 and revised in 2013    ALLERGIES:  No Known Allergies    MEDICATIONS:  STANDING MEDICATIONS  hydrochlorothiazide 12.5 milliGRAM(s) Oral daily  influenza   Vaccine 0.5 milliLiter(s) IntraMuscular once  metoprolol succinate ER 50 milliGRAM(s) Oral daily  NIFEdipine XL 30 milliGRAM(s) Oral daily  oxyCODONE    5 mG/acetaminophen 325 mG 1 Tablet(s) Oral once  pantoprazole    Tablet 40 milliGRAM(s) Oral before breakfast  rivaroxaban 20 milliGRAM(s) Oral with dinner    PRN MEDICATIONS  acetaminophen   Tablet .. 650 milliGRAM(s) Oral every 6 hours PRN    VITALS:   T(F): 96.7  HR: 58  BP: 172/74  RR: 17  SpO2: --    LABS:                        12.2   8.94  )-----------( 255      ( 28 Sep 2019 07:05 )             38.7     09-27    143  |  104  |  8<L>  ----------------------------<  96  3.5   |  29  |  0.6<L>    Ca    9.8      27 Sep 2019 07:37      PT/INR - ( 27 Sep 2019 07:37 )   PT: 20.50 sec;   INR: 1.79 ratio                   Culture - Other (collected 26 Sep 2019 14:10)  Source: .Other wound  Preliminary Report (27 Sep 2019 16:38):    Culture yields growth of greater than 3 colony types of    bacteria,  which may indicate contamination and normal rosalva    Call client services within 7 days if further workup is clinically    indicated.    Culture includes    Few Staphylococcus aureus    Culture - Blood (collected 25 Sep 2019 22:15)  Source: .Blood Blood  Preliminary Report (27 Sep 2019 18:01):    No growth to date.    Culture - Blood (collected 25 Sep 2019 22:15)  Source: .Blood Blood  Preliminary Report (27 Sep 2019 18:01):    No growth to date.    Culture - Other (collected 25 Sep 2019 21:10)  Source: .Other right abdominal wall wound  Preliminary Report (27 Sep 2019 19:11):    Numerous Staphylococcus aureus          RADIOLOGY:    PHYSICAL EXAM:  GEN: No acute distress  LUNGS: Clear to auscultation bilaterally   HEART: S1/S2 present. RRR.   ABD/ GI: Soft, drainage noted on rt side, non-distended. Bowel sounds present  EXT: NC/NC/NE/2+PP/SEPULVEDA  NEURO: AAOX3

## 2019-09-28 NOTE — PROGRESS NOTE ADULT - ASSESSMENT
67 YO F with a PMH of hernia repair who presents to the hospital for eval of right-sided flank pain for the past x 5 years. Progressively worsening. Associated with purulent discharge and a redness and swelling over the flank. Of note, the pt states that she had a hernia repair in the same area and that she never followed up with her surgeon because she thought they were making things worse. Denies any fevers, chills, cough, CP, SOB, dysuria, or constipation.     1. Right flank open wound, infected  -? enterocutaneous fistula-- IR did fistulogram-- no fistula noted  - -Wound cx showed staph aureus-- will add abx after discussion with ID  - Surgery will take her to OR for drainage of wound as there is copius discharge    2. Elevated BP  -patient has not been taking medications for over a month as per son  - changed amlodipine to nifedipine XL 30mg and BP has been trending down.    3. Morbid obesity  patient is noncompliant with drugs and diet    Pending drainage in OR on monday

## 2019-09-28 NOTE — PROGRESS NOTE ADULT - SUBJECTIVE AND OBJECTIVE BOX
PATRICIA SPRAGUE  68y, Female  Allergy: No Known Allergies      CHIEF COMPLAINT: ABD pain (28 Sep 2019 12:31)      INTERVAL EVENTS/HPI  - No acute events overnight  - T(F): , Max: 99 (09-27-19 @ 21:00)  - Denies any worsening symptoms  - Tolerating medication  - WBC Count: 8.94 K/uL (09-28-19 @ 07:05)      ROS  General: Denies fevers, chills, nightsweats, weight loss  HEENT: Denies headache, rhinorrhea, sore throat, eye pain  CV: Denies CP, palpitations  PULM: Denies SOB, cough  GI: Denies abdominal pain, diarrhea  : Denies dysuria, hematuria  MSK: Denies arthralgias  SKIN: Denies rash   NEURO: Denies paresthesias, weakness  PSYCH: Denies depression    FH non-contributory   Social Hx non-contributory    VITALS:  T(F): 96.7, Max: 99 (09-27-19 @ 21:00)  HR: 58  BP: 172/74  RR: 17Vital Signs Last 24 Hrs  T(C): 35.9 (28 Sep 2019 05:08), Max: 37.2 (27 Sep 2019 21:00)  T(F): 96.7 (28 Sep 2019 05:08), Max: 99 (27 Sep 2019 21:00)  HR: 58 (28 Sep 2019 05:08) (58 - 74)  BP: 172/74 (28 Sep 2019 05:08) (164/74 - 223/100)  BP(mean): --  RR: 17 (28 Sep 2019 05:08) (17 - 18)  SpO2: --    PHYSICAL EXAM:  Gen: NAD, resting in bed  HEENT: Normocephalic, atraumatic  Neck: supple, no lymphadenopathy  CV: Regular rate & regular rhythm  Lungs: decreased BS at bases, no fremitus  Abdomen: Soft, BS present  Ext: Warm, well perfused  Neuro: non focal, awake  Skin: no rash, no erythema      TESTS & MEASUREMENTS:                        12.2   8.94  )-----------( 255      ( 28 Sep 2019 07:05 )             38.7     09-27    143  |  104  |  8<L>  ----------------------------<  96  3.5   |  29  |  0.6<L>    Ca    9.8      27 Sep 2019 07:37              Culture - Other (collected 09-26-19 @ 14:10)  Source: .Other wound  Preliminary Report (09-27-19 @ 16:38):    Culture yields growth of greater than 3 colony types of    bacteria,  which may indicate contamination and normal rosalva    Call client services within 7 days if further workup is clinically    indicated.    Culture includes    Few Staphylococcus aureus    Culture - Blood (collected 09-25-19 @ 22:15)  Source: .Blood Blood  Preliminary Report (09-27-19 @ 18:01):    No growth to date.    Culture - Blood (collected 09-25-19 @ 22:15)  Source: .Blood Blood  Preliminary Report (09-27-19 @ 18:01):    No growth to date.    Culture - Other (collected 09-25-19 @ 21:10)  Source: .Other right abdominal wall wound  Preliminary Report (09-27-19 @ 19:11):    Numerous Staphylococcus aureus        Blood Gas Venous - Lactate: 1.6 mmoL/L (09-25-19 @ 21:04)      INFECTIOUS DISEASES TESTING  Hepatitis C Virus Interpretation: Nonreact (09-27-19 @ 07:37)      RADIOLOGY & ADDITIONAL TESTS:  I have personally reviewed the last Chest xray  CXR      CT  CT Abdomen and Pelvis w/ IV Cont:   EXAM:  CT ABDOMEN AND PELVIS IC            PROCEDURE DATE:  09/26/2019            INTERPRETATION:  CLINICAL STATEMENT: Abdominal wall abscess.      TECHNIQUE: Contiguous axial CT images were obtained from the lower chest   to the pubic symphysis following administration of 100cc Optiray 320   intravenous contrast.  Oral contrast was not administered.  Reformatted   images in the coronal and sagittal planes were acquired.    COMPARISON CT: None.    OTHER STUDIES USED FOR CORRELATION: None.       FINDINGS:    LOWER CHEST: Bibasilar subsegmental atelectasis.    HEPATOBILIARY: Postcholecystectomy.    SPLEEN: Unremarkable.    PANCREAS: Mildly atrophic.    ADRENAL GLANDS: Indeterminate 2.3 x 1.4 cm left adrenal gland nodule.   Calcification and atrophy of the right adrenal gland.    KIDNEYS: Right renal hypertrophy. No right  hydronephrosis. Congenitally   absent left kidney. History.    ABDOMINOPELVIC NODES: Unremarkable.    PELVIC ORGANS: Calcified uterine fibroid.    PERITONEUM/MESENTERY/BOWEL: There is a loop of colon closely adjacent   with the patient's right anterior abdominal wall and flank inflammatory   changes with possible connection (series 4/127 and series 601/71-72).    BONES/SOFT TISSUES: There are inflammatory changes noted along the right   anterior abdomen with a more defined fluid collection measuring 3.1 x 1.7   x 1.9 cm with enhancement within the subcutaneous soft tissues of the   right flank (series 4/137). Right flank postsurgical changes with   surgical clipsnoted along the right anterior abdominal wall. Additional   area of inflammatory changes within the subcutaneous soft tissues just   inferior to the sternum without evidence of abscess. Fatty atrophy of the   paraspinal musculature. Degenerative changes of the thoracolumbar spine.    OTHER: Atherosclerotic disease of aorta.      IMPRESSION:     Inflammatory changes noted along the right anterior abdomen and right   flank. 3.1 cm more focal area of fluid with enhancing along the right   flank which may represent an early abscess.    Loop of colon closely adjacent with the previously noted inflammatory   changes with suggestion of a possible enterocutaneous fistula along the   right flank.    Dr. Marisel Hendricks discussed preliminary findings with NAYELY BERNARDO on   9/26/2019 3:03 AM with readback.              MARISEL HENDRICKS M.D., RESIDENT RADIOLOGIST  This document has been electronically signed.  MARY ROCA M.D., ATTENDING RADIOLOGIST  This document has been electronically signed. Sep 26 2019  6:31AM             (09-26-19 @ 02:00)      CARDIOLOGY TESTING      MEDICATIONS  hydrochlorothiazide 12.5  influenza   Vaccine 0.5  metoprolol succinate ER 50  NIFEdipine XL 30  pantoprazole    Tablet 40  rivaroxaban 20      ANTIBIOTICS:      All available historical data has been reviewed

## 2019-09-28 NOTE — PROGRESS NOTE ADULT - SUBJECTIVE AND OBJECTIVE BOX
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 Pt has no complaints. Denies pain, f/c. Had fistulogram yesterday which demonstrated no evidence of fistula            Vital Signs Last 24 Hrs  T(C): 35.9 (28 Sep 2019 05:08), Max: 37.2 (27 Sep 2019 21:00)  T(F): 96.7 (28 Sep 2019 05:08), Max: 99 (27 Sep 2019 21:00)  HR: 58 (28 Sep 2019 05:08) (58 - 74)  BP: 172/74 (28 Sep 2019 05:08) (164/74 - 223/100)  BP(mean): --  RR: 17 (28 Sep 2019 05:08) (17 - 18)  SpO2: --    General Appearance: Appears well, NAD  Neck: Supple  Chest: Equal expansion bilaterally, equal breath sounds  CV: Pulse regular presently  Abdomen: Soft, NT/ND,+bs,  no rebound/gaurding  3 areas in right lateral anterior abdomen with some greenish drainage  1 epigastric area without drainage  Extremities: Grossly symmetric, no calf tend b/l                  MEDICATIONS  (STANDING):  amLODIPine   Tablet 10 milliGRAM(s) Oral daily  hydrochlorothiazide 12.5 milliGRAM(s) Oral daily  influenza   Vaccine 0.5 milliLiter(s) IntraMuscular once  metoprolol succinate ER 50 milliGRAM(s) Oral daily  pantoprazole    Tablet 40 milliGRAM(s) Oral before breakfast  rivaroxaban 20 milliGRAM(s) Oral with dinner    MEDICATIONS  (PRN):  acetaminophen   Tablet .. 650 milliGRAM(s) Oral every 6 hours PRN Mild Pain (1 - 3)      LABS:                               12.2   8.94  )-----------( 255      ( 28 Sep 2019 07:05 )             38.7       09-27    143  |  104  |  8<L>  ----------------------------<  96  3.5   |  29  |  0.6<L>    Ca    9.8      27 Sep 2019 07:37                        PT/INR - ( 27 Sep 2019 07:37 )   PT: 20.50 sec;   INR: 1.79 ratio                             Culture Results:   Culture yields growth of greater than 3 colony types of  bacteria,  which may indicate contamination and normal rosalva  Call client services within 7 days if further workup is clinically  indicated.  Culture includes  Few Staphylococcus aureus (09-26 @ 14:10)  Culture Results:   No growth to date. (09-25 @ 22:15)  Culture Results:   No growth to date. (09-25 @ 22:15)  Culture Results:   Numerous Staphylococcus aureus (09-25 @ 21:10)    RADIOLOGY & ADDITIONAL STUDIES:    Progress Note:   · Provider Specialty	[facility] Radiology	    Reason for Admission:   Reason for Admission:  · Reason for Admission	ABD pain	      · Subjective and Objective: 	  Interventional Radiology Brief- Operative Note    Procedure: enterocutaneous fistulogram    Pre-Op Diagnosis: Right flank inflammatory changes    Post-Op Diagnosis: Same; no fistula    Attending:   Gabriele    Resident:   none    Anesthesia (type):  [ ] General Anesthesia  [ ] Sedation  [ ] Spinal Anesthesia  [ x] Local/Regional    Contrast:  20ml    Estimated Blood Loss:  < 2ml    Condition:   [ ] Critical  [ ] Serious  [ ] Fair   [x ] Good    Findings/Follow up Plan of Care:    -Catheter cannulated each cutaneous hole and contrast injected into each of them.  No evidence of fistula       Specimens Removed:  none  Implants:  none  Complications:  none  Condition/Disposition:  VT to [facility]    Please call Interventional Radiology [phone] with any questions, concerns, or issues.              Electronic Signatures:  Timothy Suazo)  (Signed 27-Sep-2019 16:41)  	Authored: Progress Note, Reason for Admission, Subjective and Objective      Last Updated: 27-Sep-2019 16:41 by Timothy Suazo)

## 2019-09-28 NOTE — PROGRESS NOTE ADULT - ASSESSMENT
68y F admitted with PHLEGMON       PROBLEMS    CT with inflammatory changes noted along the right anterior abdomen and right flank. 3.1 cm more focal area of fluid with enhancing along the right flank which may represent an early abscess.  Loop of colon closely adjacent with the previously noted inflammatory changes with suggestion of a possible enterocutaneous fistula along the right flank.    Pt with hx of hernia repair in 2011 and revised in 2013 ([place], [place]) brought to the ED by son for  wound check. As per son, the wound hasn't been healing right since 2013. Patient last saw her surgeon last early 2014 and  didn't want to go back. son brought her to ED for evaluation due purulent drainage from wound.      New problem with additional W/U  acute illness with systemic symptoms     Pt afebrile with normal wbc      DM    Bibasilar subsegmental atelectasis on atelectasis    Morbid Obesity (BMI>40)    C&S includes > 3 colony types including Staph aureus       PLAN  Await OR drainage with cultures  Surgical F/U

## 2019-09-29 PROCEDURE — 99233 SBSQ HOSP IP/OBS HIGH 50: CPT

## 2019-09-29 RX ORDER — LANOLIN ALCOHOL/MO/W.PET/CERES
5 CREAM (GRAM) TOPICAL AT BEDTIME
Refills: 0 | Status: COMPLETED | OUTPATIENT
Start: 2019-09-29 | End: 2019-09-29

## 2019-09-29 RX ORDER — BENZOCAINE AND MENTHOL 5; 1 G/100ML; G/100ML
1 LIQUID ORAL THREE TIMES A DAY
Refills: 0 | Status: DISCONTINUED | OUTPATIENT
Start: 2019-09-29 | End: 2019-09-30

## 2019-09-29 RX ORDER — PHYTONADIONE (VIT K1) 5 MG
2.5 TABLET ORAL ONCE
Refills: 0 | Status: COMPLETED | OUTPATIENT
Start: 2019-09-29 | End: 2019-09-29

## 2019-09-29 RX ORDER — OXYCODONE AND ACETAMINOPHEN 5; 325 MG/1; MG/1
1 TABLET ORAL ONCE
Refills: 0 | Status: DISCONTINUED | OUTPATIENT
Start: 2019-09-29 | End: 2019-09-29

## 2019-09-29 RX ADMIN — Medication 2.5 MILLIGRAM(S): at 11:10

## 2019-09-29 RX ADMIN — Medication 30 MILLIGRAM(S): at 06:07

## 2019-09-29 RX ADMIN — OXYCODONE AND ACETAMINOPHEN 1 TABLET(S): 5; 325 TABLET ORAL at 13:17

## 2019-09-29 RX ADMIN — Medication 12.5 MILLIGRAM(S): at 06:07

## 2019-09-29 RX ADMIN — OXYCODONE AND ACETAMINOPHEN 1 TABLET(S): 5; 325 TABLET ORAL at 13:48

## 2019-09-29 RX ADMIN — BENZOCAINE AND MENTHOL 1 LOZENGE: 5; 1 LIQUID ORAL at 13:46

## 2019-09-29 RX ADMIN — PANTOPRAZOLE SODIUM 40 MILLIGRAM(S): 20 TABLET, DELAYED RELEASE ORAL at 06:07

## 2019-09-29 RX ADMIN — Medication 50 MILLIGRAM(S): at 06:07

## 2019-09-29 RX ADMIN — Medication 650 MILLIGRAM(S): at 11:22

## 2019-09-29 RX ADMIN — Medication 650 MILLIGRAM(S): at 11:52

## 2019-09-29 RX ADMIN — BENZOCAINE AND MENTHOL 1 LOZENGE: 5; 1 LIQUID ORAL at 23:22

## 2019-09-29 RX ADMIN — Medication 5 MILLIGRAM(S): at 23:22

## 2019-09-29 NOTE — PROGRESS NOTE ADULT - SUBJECTIVE AND OBJECTIVE BOX
SURGERY FOLLOW UP NOTE     68y old Female who presents with a chief complaint of ABD pain (28 Sep 2019 13:26)    Currently admitted to medicine with the primary diagnosis of Phlegmon    SEEN WITH DR. VIEIRA, ON SCHEDULE FOR OR IN AM      DENIES ANY FEVER/ CP / SOB / NVD     PAST MEDICAL & SURGICAL HISTORY  Obesity  Diabetes mellitus  HTN (hypertension)  History of cholecystectomy  H/O hernia repair: 2011 and revised in 2013    ALLERGIES:  No Known Allergies    Vital Signs Last 24 Hrs  T(C): 36.1 (29 Sep 2019 05:00), Max: 36.7 (28 Sep 2019 14:08)  T(F): 96.9 (29 Sep 2019 05:00), Max: 98.1 (28 Sep 2019 14:08)  HR: 53 (29 Sep 2019 11:18) (53 - 69)  BP: 153/71 (29 Sep 2019 11:18) (148/70 - 191/85)  BP(mean): --  RR: 17 (29 Sep 2019 11:18) (16 - 18)  SpO2: 98% (29 Sep 2019 11:18) (98% - 98%)                                           12.2   8.94  )-----------( 255      ( 28 Sep 2019 07:05 )             38.7         Culture - Other (collected 26 Sep 2019 14:10)  Source: .Other wound  Final Report (28 Sep 2019 22:02):    Culture yields growth of greater than 3 colony types of    bacteria,  which may indicate contamination and normal rosalva    Call client services within 7 days if further workup is clinically    indicated.    Culture includes    Few Staphylococcus aureus  Organism: Staphylococcus aureus (28 Sep 2019 22:02)  Organism: Staphylococcus aureus (28 Sep 2019 22:02)      PHYSICAL EXAM:  GEN: No acute distress  LUNGS: Clear to auscultation bilaterally   HEART: S1/S2 present. RRR.   ABD/ GI: Soft, non-tender,  abd distended with drainage on rt side--pus  EXT: NC/NC/NE/2+PP/SEPULVEDA  NEURO: AAOX3

## 2019-09-29 NOTE — PROGRESS NOTE ADULT - ASSESSMENT
67 YO F with a PMH of hernia repair who presents to the hospital for eval of right-sided flank pain for the past x 5 years. on.     1. Right flank open wound, infected  -? enterocutaneous fistula-- IR did fistulogram-- no fistula noted  - -Wound cx showed staph aureus with mixed organisms--  ID suggested --will add ABX after  drainage and repeat cultures   - will take her to OR for drainage of wound as there is copius discharge    D/W DR. VIEIRA   WILL TRY TO DO CASE UNDER LOCAL SEDATION BUT POSS , GEN ANSTH.   ECHO  CARDIO EVAL   NPO P MN  TYPE SCREEN AND LABS IN  AM

## 2019-09-29 NOTE — PROGRESS NOTE ADULT - SUBJECTIVE AND OBJECTIVE BOX
SUBJECTIVE:    Patient is a 68y old Female who presents with a chief complaint of ABD pain (28 Sep 2019 13:26)    Currently admitted to medicine with the primary diagnosis of Phlegmon     Today is hospital day 3d.     PAST MEDICAL & SURGICAL HISTORY  Obesity  Diabetes mellitus  HTN (hypertension)  History of cholecystectomy  H/O hernia repair: 2011 and revised in 2013    ALLERGIES:  No Known Allergies    MEDICATIONS:  STANDING MEDICATIONS  hydrochlorothiazide 12.5 milliGRAM(s) Oral daily  influenza   Vaccine 0.5 milliLiter(s) IntraMuscular once  metoprolol succinate ER 50 milliGRAM(s) Oral daily  NIFEdipine XL 30 milliGRAM(s) Oral daily  pantoprazole    Tablet 40 milliGRAM(s) Oral before breakfast  rivaroxaban 20 milliGRAM(s) Oral with dinner    PRN MEDICATIONS  acetaminophen   Tablet .. 650 milliGRAM(s) Oral every 6 hours PRN    VITALS:   T(F): 96.9  HR: 65  BP: 191/85  RR: 18  SpO2: --    LABS:                        12.2   8.94  )-----------( 255      ( 28 Sep 2019 07:05 )             38.7                     Culture - Other (collected 26 Sep 2019 14:10)  Source: .Other wound  Final Report (28 Sep 2019 22:02):    Culture yields growth of greater than 3 colony types of    bacteria,  which may indicate contamination and normal rosalva    Call client services within 7 days if further workup is clinically    indicated.    Culture includes    Few Staphylococcus aureus  Organism: Staphylococcus aureus (28 Sep 2019 22:02)  Organism: Staphylococcus aureus (28 Sep 2019 22:02)          RADIOLOGY:    PHYSICAL EXAM:  GEN: No acute distress  LUNGS: Clear to auscultation bilaterally   HEART: S1/S2 present. RRR.   ABD/ GI: Soft, non-tender,  abd distended with drainage on rt side--pus  EXT: NC/NC/NE/2+PP/SEPULVEDA  NEURO: AAOX3

## 2019-09-30 LAB
ANION GAP SERPL CALC-SCNC: 12 MMOL/L — SIGNIFICANT CHANGE UP (ref 7–14)
BLD GP AB SCN SERPL QL: SIGNIFICANT CHANGE UP
BUN SERPL-MCNC: 15 MG/DL — SIGNIFICANT CHANGE UP (ref 10–20)
CALCIUM SERPL-MCNC: 10 MG/DL — SIGNIFICANT CHANGE UP (ref 8.5–10.1)
CHLORIDE SERPL-SCNC: 101 MMOL/L — SIGNIFICANT CHANGE UP (ref 98–110)
CO2 SERPL-SCNC: 30 MMOL/L — SIGNIFICANT CHANGE UP (ref 17–32)
CREAT SERPL-MCNC: 0.7 MG/DL — SIGNIFICANT CHANGE UP (ref 0.7–1.5)
GLUCOSE BLDC GLUCOMTR-MCNC: 104 MG/DL — HIGH (ref 70–99)
GLUCOSE BLDC GLUCOMTR-MCNC: 113 MG/DL — HIGH (ref 70–99)
GLUCOSE BLDC GLUCOMTR-MCNC: 118 MG/DL — HIGH (ref 70–99)
GLUCOSE BLDC GLUCOMTR-MCNC: 141 MG/DL — HIGH (ref 70–99)
GLUCOSE SERPL-MCNC: 121 MG/DL — HIGH (ref 70–99)
HCT VFR BLD CALC: 43.1 % — SIGNIFICANT CHANGE UP (ref 37–47)
HGB BLD-MCNC: 13.6 G/DL — SIGNIFICANT CHANGE UP (ref 12–16)
INR BLD: 1.18 RATIO — SIGNIFICANT CHANGE UP (ref 0.65–1.3)
MCHC RBC-ENTMCNC: 26.3 PG — LOW (ref 27–31)
MCHC RBC-ENTMCNC: 31.6 G/DL — LOW (ref 32–37)
MCV RBC AUTO: 83.4 FL — SIGNIFICANT CHANGE UP (ref 81–99)
NRBC # BLD: 0 /100 WBCS — SIGNIFICANT CHANGE UP (ref 0–0)
PLATELET # BLD AUTO: 235 K/UL — SIGNIFICANT CHANGE UP (ref 130–400)
POTASSIUM SERPL-MCNC: 3.6 MMOL/L — SIGNIFICANT CHANGE UP (ref 3.5–5)
POTASSIUM SERPL-SCNC: 3.6 MMOL/L — SIGNIFICANT CHANGE UP (ref 3.5–5)
PROTHROM AB SERPL-ACNC: 13.5 SEC — HIGH (ref 9.95–12.87)
RBC # BLD: 5.17 M/UL — SIGNIFICANT CHANGE UP (ref 4.2–5.4)
RBC # FLD: 14.2 % — SIGNIFICANT CHANGE UP (ref 11.5–14.5)
SODIUM SERPL-SCNC: 143 MMOL/L — SIGNIFICANT CHANGE UP (ref 135–146)
WBC # BLD: 7.36 K/UL — SIGNIFICANT CHANGE UP (ref 4.8–10.8)
WBC # FLD AUTO: 7.36 K/UL — SIGNIFICANT CHANGE UP (ref 4.8–10.8)

## 2019-09-30 PROCEDURE — 99233 SBSQ HOSP IP/OBS HIGH 50: CPT

## 2019-09-30 PROCEDURE — 88304 TISSUE EXAM BY PATHOLOGIST: CPT | Mod: 26

## 2019-09-30 RX ORDER — VANCOMYCIN HCL 1 G
1000 VIAL (EA) INTRAVENOUS EVERY 12 HOURS
Refills: 0 | Status: DISCONTINUED | OUTPATIENT
Start: 2019-09-30 | End: 2019-10-01

## 2019-09-30 RX ORDER — CEFAZOLIN SODIUM 1 G
1000 VIAL (EA) INJECTION EVERY 8 HOURS
Refills: 0 | Status: DISCONTINUED | OUTPATIENT
Start: 2019-09-30 | End: 2019-10-01

## 2019-09-30 RX ORDER — SODIUM CHLORIDE 9 MG/ML
1000 INJECTION, SOLUTION INTRAVENOUS
Refills: 0 | Status: DISCONTINUED | OUTPATIENT
Start: 2019-09-30 | End: 2019-09-30

## 2019-09-30 RX ORDER — NIFEDIPINE 30 MG
30 TABLET, EXTENDED RELEASE 24 HR ORAL DAILY
Refills: 0 | Status: DISCONTINUED | OUTPATIENT
Start: 2019-09-30 | End: 2019-10-02

## 2019-09-30 RX ORDER — HEPARIN SODIUM 5000 [USP'U]/ML
5000 INJECTION INTRAVENOUS; SUBCUTANEOUS EVERY 8 HOURS
Refills: 0 | Status: DISCONTINUED | OUTPATIENT
Start: 2019-09-30 | End: 2019-09-30

## 2019-09-30 RX ORDER — ONDANSETRON 8 MG/1
4 TABLET, FILM COATED ORAL ONCE
Refills: 0 | Status: DISCONTINUED | OUTPATIENT
Start: 2019-09-30 | End: 2019-10-02

## 2019-09-30 RX ORDER — HYDROMORPHONE HYDROCHLORIDE 2 MG/ML
0.5 INJECTION INTRAMUSCULAR; INTRAVENOUS; SUBCUTANEOUS
Refills: 0 | Status: DISCONTINUED | OUTPATIENT
Start: 2019-09-30 | End: 2019-10-02

## 2019-09-30 RX ORDER — HYDROCHLOROTHIAZIDE 25 MG
12.5 TABLET ORAL DAILY
Refills: 0 | Status: DISCONTINUED | OUTPATIENT
Start: 2019-09-30 | End: 2019-10-02

## 2019-09-30 RX ORDER — RIVAROXABAN 15 MG-20MG
20 KIT ORAL
Refills: 0 | Status: DISCONTINUED | OUTPATIENT
Start: 2019-10-01 | End: 2019-10-01

## 2019-09-30 RX ORDER — PANTOPRAZOLE SODIUM 20 MG/1
40 TABLET, DELAYED RELEASE ORAL
Refills: 0 | Status: DISCONTINUED | OUTPATIENT
Start: 2019-09-30 | End: 2019-10-02

## 2019-09-30 RX ORDER — ACETAMINOPHEN 500 MG
650 TABLET ORAL ONCE
Refills: 0 | Status: DISCONTINUED | OUTPATIENT
Start: 2019-09-30 | End: 2019-10-02

## 2019-09-30 RX ORDER — ACETAMINOPHEN 500 MG
650 TABLET ORAL EVERY 6 HOURS
Refills: 0 | Status: DISCONTINUED | OUTPATIENT
Start: 2019-09-30 | End: 2019-10-02

## 2019-09-30 RX ADMIN — Medication 650 MILLIGRAM(S): at 17:28

## 2019-09-30 RX ADMIN — Medication 250 MILLIGRAM(S): at 17:29

## 2019-09-30 RX ADMIN — SODIUM CHLORIDE 100 MILLILITER(S): 9 INJECTION, SOLUTION INTRAVENOUS at 15:53

## 2019-09-30 RX ADMIN — Medication 30 MILLIGRAM(S): at 04:46

## 2019-09-30 RX ADMIN — BENZOCAINE AND MENTHOL 1 LOZENGE: 5; 1 LIQUID ORAL at 04:45

## 2019-09-30 RX ADMIN — Medication 100 MILLIGRAM(S): at 21:12

## 2019-09-30 RX ADMIN — Medication 650 MILLIGRAM(S): at 23:49

## 2019-09-30 RX ADMIN — Medication 12.5 MILLIGRAM(S): at 04:47

## 2019-09-30 RX ADMIN — SODIUM CHLORIDE 50 MILLILITER(S): 9 INJECTION, SOLUTION INTRAVENOUS at 11:34

## 2019-09-30 RX ADMIN — HYDROMORPHONE HYDROCHLORIDE 0.5 MILLIGRAM(S): 2 INJECTION INTRAMUSCULAR; INTRAVENOUS; SUBCUTANEOUS at 19:44

## 2019-09-30 RX ADMIN — PANTOPRAZOLE SODIUM 40 MILLIGRAM(S): 20 TABLET, DELAYED RELEASE ORAL at 04:46

## 2019-09-30 RX ADMIN — SODIUM CHLORIDE 50 MILLILITER(S): 9 INJECTION, SOLUTION INTRAVENOUS at 17:29

## 2019-09-30 NOTE — ED PROVIDER NOTE - NS ED ATTENDING STATEMENT MOD
with patient
I have personally performed a face to face diagnostic evaluation on this patient. I have reviewed the ACP note and agree with the history, exam and plan of care, except as noted.

## 2019-09-30 NOTE — CHART NOTE - NSCHARTNOTEFT_GEN_A_CORE
Patient seen at bedside - happy, resting comfortably.    Patient seen earlier today w/o questions or concerns - pt currently in OR for drainage of wound    Patient encouraged to contact PA with any further questions or concerns.

## 2019-09-30 NOTE — CHART NOTE - NSCHARTNOTEFT_GEN_A_CORE
PACU ANESTHESIA ADMISSION NOTE      Procedure:   Post op diagnosis:      ____  Intubated  TV:______       Rate: ______      FiO2: ______    ___x_  Patent Airway    ___x_  Full return of protective reflexes    ___x_  Full recovery from anesthesia / back to baseline status    Vitals:  T(C): 36.4 (09-30-19 @ 04:15), Max: 36.4 (09-30-19 @ 04:15)  HR: 65 (09-30-19 @ 13:06) (65 - 84)  BP: 173/79 (09-30-19 @ 13:06) (173/77 - 173/79)  RR: 18 (09-30-19 @ 04:15) (17 - 18)  SpO2: --    Mental Status:  _x___ Awake   ___x__ Alert   _____ Drowsy   _____ Sedated    Nausea/Vomiting:  ____ NO  _x_____Yes,   See Post - Op Orders          Pain Scale (0-10):  _____    Treatment: ____ None    _x__ See Post - Op/PCA Orders    Post - Operative Fluids:   ____ Oral   __x__ See Post - Op Orders    Plan: Discharge:   ____Home       __x___Floor     _____Critical Care    _____  Other:_________________    Comments: uneventful anesthesia course no complications. VItals stable. Pt transferred to PACU

## 2019-09-30 NOTE — PROGRESS NOTE ADULT - SUBJECTIVE AND OBJECTIVE BOX
SUBJECTIVE:    Patient is a 68y old Female who presents with a chief complaint of ABD pain (30 Sep 2019 12:25)    Currently admitted to medicine with the primary diagnosis of Phlegmon     Today is hospital day 4d.     PAST MEDICAL & SURGICAL HISTORY  Obesity  Diabetes mellitus  HTN (hypertension)  History of cholecystectomy  H/O hernia repair: 2011 and revised in 2013    ALLERGIES:  No Known Allergies    MEDICATIONS:  STANDING MEDICATIONS  acetaminophen   Tablet .. 650 milliGRAM(s) Oral every 6 hours  ceFAZolin   IVPB 1000 milliGRAM(s) IV Intermittent every 8 hours  dextrose 5%. 1000 milliLiter(s) IV Continuous <Continuous>  heparin  Injectable 5000 Unit(s) SubCutaneous every 8 hours  hydrochlorothiazide 12.5 milliGRAM(s) Oral daily  influenza   Vaccine 0.5 milliLiter(s) IntraMuscular once  lactated ringers. 1000 milliLiter(s) IV Continuous <Continuous>  NIFEdipine XL 30 milliGRAM(s) Oral daily  pantoprazole    Tablet 40 milliGRAM(s) Oral before breakfast  vancomycin  IVPB 1000 milliGRAM(s) IV Intermittent every 12 hours    PRN MEDICATIONS  acetaminophen   Tablet .. 650 milliGRAM(s) Oral once PRN  HYDROmorphone  Injectable 0.5 milliGRAM(s) IV Push every 10 minutes PRN  ondansetron Injectable 4 milliGRAM(s) IV Push once PRN    VITALS:   T(F): 97.1  HR: 66  BP: 142/69  RR: 16  SpO2: --    LABS:                        13.6   7.36  )-----------( 235      ( 30 Sep 2019 06:29 )             43.1     09-30    143  |  101  |  15  ----------------------------<  121<H>  3.6   |  30  |  0.7    Ca    10.0      30 Sep 2019 06:29      PT/INR - ( 30 Sep 2019 06:29 )   PT: 13.50 sec;   INR: 1.18 ratio                       RADIOLOGY:    PHYSICAL EXAM:  GEN: No acute distress  LUNGS: Clear to auscultation bilaterally   HEART: S1/S2 present. RRR.   ABD/ GI: Soft, non-tender,  rt sided wound in dressing  EXT: NC/NC/NE/2+PP/SEPULVEDA  NEURO: AAOX3

## 2019-09-30 NOTE — BRIEF OPERATIVE NOTE - NSICDXBRIEFPROCEDURE_GEN_ALL_CORE_FT
PROCEDURES:  Excision of sinus tract of abdominal wall 30-Sep-2019 15:26:15 sinus tract x 4, anterior abdominal wall Ervin Zavala

## 2019-09-30 NOTE — PRE-ANESTHESIA EVALUATION ADULT - NSANTHOSAYNRD_GEN_A_CORE
No. VALENTIN screening performed.  STOP BANG Legend: 0-2 = LOW Risk; 3-4 = INTERMEDIATE Risk; 5-8 = HIGH Risk

## 2019-09-30 NOTE — CONSULT NOTE ADULT - SUBJECTIVE AND OBJECTIVE BOX
HPI:  67 YO F with a PMH of hernia repair who presents to the hospital for eval of right-sided flank pain for the past x 5 years. Progressively worsening. Associated with purulent discharge and a redness and swelling over the flank. Of note, the pt states that she had a hernia repair in the same area and that she never followed up with her surgeon because she thought they were making things worse. Denies any fevers, chills, cough, CP, SOB, dysuria, or constipation.     In the ED, the pt had a CT-AP performed which showed a possible enterocutaneous fistula (26 Sep 2019 05:36)    Keren Yap;  pt is 68 F ,obese ,A fib on Xarelto ,HTN ,not compliant with meds almost home bound and has not seen her PMD in many yrs ,is admitted for infected flank wound ,en route to OR today .  Plan is ultimately d/c  pt to SNF after drainage of wound  ,But Keren is consulted if home visit could be arranged for her .    PAST MEDICAL & SURGICAL HISTORY  PAST MEDICAL & SURGICAL HISTORY:  Obesity  Diabetes mellitus  HTN (hypertension)  History of cholecystectomy  H/O hernia repair: 2011 and revised in 2013    SOCIAL HISTORY:    ALLERGIES:  No Known Allergies    MEDICATIONS:  STANDING MEDICATIONS  benzocaine 15 mG/menthol 3.6 mG Lozenge 1 Lozenge Oral three times a day  dextrose 5%. 1000 milliLiter(s) IV Continuous <Continuous>  hydrochlorothiazide 12.5 milliGRAM(s) Oral daily  influenza   Vaccine 0.5 milliLiter(s) IntraMuscular once  NIFEdipine XL 30 milliGRAM(s) Oral daily  pantoprazole    Tablet 40 milliGRAM(s) Oral before breakfast    PRN MEDICATIONS  acetaminophen   Tablet .. 650 milliGRAM(s) Oral every 6 hours PRN    VITALS:   T(F): 97.6  HR: 65  BP: 173/79  RR: 18  SpO2: --    LABS:                        13.6   7.36  )-----------( 235      ( 30 Sep 2019 06:29 )             43.1     09-30    143  |  101  |  15  ----------------------------<  121<H>  3.6   |  30  |  0.7    Ca    10.0      30 Sep 2019 06:29      PT/INR - ( 30 Sep 2019 06:29 )   PT: 13.50 sec;   INR: 1.18 ratio                       RADIOLOGY:    PHYSICAL EXAM: no completed ,pt en route to OR  GEN: No acute distress  HEENT:   LUNGS:   HEART:  EXT:  NEURO: AAOX3

## 2019-09-30 NOTE — PROGRESS NOTE ADULT - ASSESSMENT
69 YO F with a PMH of hernia repair who presents to the hospital for eval of right-sided flank pain for the past x 5 years. on.     1. Right flank open wound, infected  -? enterocutaneous fistula-- IR did fistulogram-- no fistula noted  - -Wound cx showed staph aureus with mixed organisms--  ID suggested --will add ABX after  drainage and repeat cultures   - will take her to OR for drainage of wound as there is copius discharge    D/W DR. VIEIRA   WILL TRY TO DO CASE UNDER LOCAL SEDATION BUT POSS , GEN ANSTH. today   ECHO- done  NPO

## 2019-09-30 NOTE — PROGRESS NOTE ADULT - SUBJECTIVE AND OBJECTIVE BOX
POC:  68y old Female who presents with a chief complaint of ABD pain POC:  68y old Female who presents with a chief complaint of ABD pain with  Draining cutaneous sinus tract  ·  PROCEDURES:  Excision of sinus tract of abdominal wall , sinus tract x 4, anterior abdominal wall        Operative Findings:  · Operative Findings	Suture granuloma with associated sinus tract x 4 anterior abdominal wall	     Evidence of Infection or Abscess:  · Evidence of infection or abscess identified at the start or during the surgical procedure:	Yes	  · Select type of infection:	purulence	  · Please describe:	Sinus tract with purulent drainage	  V/S  T 99.1, /62, HR 76, RR 16  PE: AXOX3   ABD  + BS  SOFT,   DRESSING  CDI   A&P;  Draining cutaneous sinus tract   S/P    Excision of sinus tract of abdominal wall , sinus tract x 4, anterior abdominal wall POC:  68y old Female who presents with a chief complaint of ABD pain with  Draining cutaneous sinus tract  ·  PROCEDURES:  Excision of sinus tract of abdominal wall , sinus tract x 4, anterior abdominal wall        Operative Findings:  · Operative Findings	Suture granuloma with associated sinus tract x 4 anterior abdominal wall	     Evidence of Infection or Abscess:  · Evidence of infection or abscess identified at the start or during the surgical procedure:	Yes	  · Select type of infection:	purulence	  · Please describe:	Sinus tract with purulent drainage	  V/S  T 99.1, /62, HR 76, RR 16  PE: AXOX3   ABD  + BS  SOFT,   DRESSING  CDI   A&P;  Draining cutaneous sinus tract   S/P    Excision of sinus tract of abdominal wall , sinus tract x 4, anterior abdominal wall   diet--regular	  IVF   IV ABX-- VANCO/ ANCEF  PAIN MGMT   DVT/ GI PROPHYLAXIS-HEPARIN SC/ PROTONIX   WILL FOLLOW

## 2019-09-30 NOTE — CHART NOTE - NSCHARTNOTEFT_GEN_A_CORE
PACU ANESTHESIA ADMISSION NOTE      Procedure:   Post op diagnosis:      ____  Intubated  TV:______       Rate: ______      FiO2: ______    ___x_  Patent Airway    __x__  Full return of protective reflexes    _x___  Full recovery from anesthesia / back to baseline status    Vitals:  T(C): 36.4 (09-30-19 @ 04:15), Max: 36.4 (09-30-19 @ 04:15)  HR: 65 (09-30-19 @ 13:06) (65 - 84)  BP: 173/79 (09-30-19 @ 13:06) (173/77 - 173/79)  RR: 18 (09-30-19 @ 04:15) (17 - 18)  SpO2: --    Mental Status:  ___x_ Awake   _x____ Alert   _____ Drowsy   _____ Sedated    Nausea/Vomiting:  ____ NO  ___x___Yes,   See Post - Op Orders          Pain Scale (0-10):  _____    Treatment: ____ None    _x___ See Post - Op/PCA Orders    Post - Operative Fluids:   ____ Oral   __x__ See Post - Op Orders    Plan: Discharge:   __x__Home       _____Floor     _____Critical Care    _____  Other:_________________    Comments: uneventful anesthesia course no complications. VItals stable. Pt transferred to PACU

## 2019-09-30 NOTE — PROGRESS NOTE ADULT - ASSESSMENT
67 YO F with a PMH of hernia repair who presents to the hospital for eval of right-sided flank pain for the past x 5 years. Progressively worsening. Associated with purulent discharge and a redness and swelling over the flank. Of note, the pt states that she had a hernia repair in the same area and that she never followed up with her surgeon because she thought they were making things worse. Denies any fevers, chills, cough, CP, SOB, dysuria, or constipation.     1. Right flank open wound, infected  -? abscess on rt abdominal wall-- IR did fistulogram-- no fistula noted  - -Wound cx showed staph aureus with mixed organisms--  ID suggested --will add ABX after  drainage and repeat cultures   - was taken to OR today and  abscess was drained-- operative note is pending    2. Elevated BP  -patient has not been taking medications for over a month as per son  - changed amlodipine to nifedipine XL 30mg and may increase dose if BP high-- today she has some pain in abd.    3. Morbid obesity  patient is noncompliant with drugs and diet    4.  afib on xarelto-- held now-- restart from tomorrow. metoprolol stopped as HR was low and can be restarted at lower dose.    Pending drainage in OR on monday.   . Pharmacy said her MD is Citlali Rendon and the doctor prescribing xarelto. cardiologist is Elder Rose [phone].  spoke with him and his office said she came last in 2013--echo pending results      pending-- wound culture after drainage today-- ID follow up no ABX given so far.

## 2019-09-30 NOTE — PROGRESS NOTE ADULT - SUBJECTIVE AND OBJECTIVE BOX
SURGERY FOLLOW UP NOTE     68y old Female who presents with a chief complaint of ABD pain (28 Sep 2019 13:26)    Currently admitted to medicine with the primary diagnosis of Phlegmon    ON SCHEDULE FOR OR IN AM      DENIES ANY FEVER/ CP / SOB / NVD     PAST MEDICAL & SURGICAL HISTORY  Obesity  Diabetes mellitus  HTN (hypertension)  History of cholecystectomy  H/O hernia repair: 2011 and revised in 2013    ALLERGIES:  No Known Allergies    Vital Signs Last 24 Hrs  T(C): 36.4 (30 Sep 2019 04:15), Max: 37.4 (29 Sep 2019 13:39)  T(F): 97.6 (30 Sep 2019 04:15), Max: 99.4 (29 Sep 2019 13:39)  HR: 65 (30 Sep 2019 04:15) (53 - 84)  BP: 173/79 (30 Sep 2019 04:15) (153/71 - 173/79)  BP(mean): --  RR: 18 (30 Sep 2019 04:15) (17 - 18)  SpO2: 98% (29 Sep 2019 11:18) (98% - 98%)                   PHYSICAL EXAM:  GEN: No acute distress  LUNGS: Clear to auscultation bilaterally   HEART: S1/S2 present. RRR.   ABD/ GI: Soft, non-tender,  abd distended with drainage on rt side--pus, Right flank with erythema and redness.  EXT: NC/NC/NE/2+PP/SEPULVEDA  NEURO: AAOX3        09-30    143  |  101  |  15  ----------------------------<  121<H>  3.6   |  30  |  0.7    Ca    10.0      30 Sep 2019 06:29                              13.6   7.36  )-----------( 235      ( 30 Sep 2019 06:29 )             43.1     CAPILLARY BLOOD GLUCOSE      POCT Blood Glucose.: 141 mg/dL (30 Sep 2019 08:30)

## 2019-10-01 DIAGNOSIS — L02.211 CUTANEOUS ABSCESS OF ABDOMINAL WALL: ICD-10-CM

## 2019-10-01 LAB
ALBUMIN SERPL ELPH-MCNC: 3.8 G/DL — SIGNIFICANT CHANGE UP (ref 3.5–5.2)
ALP SERPL-CCNC: 106 U/L — SIGNIFICANT CHANGE UP (ref 30–115)
ALT FLD-CCNC: 51 U/L — HIGH (ref 0–41)
ANION GAP SERPL CALC-SCNC: 13 MMOL/L — SIGNIFICANT CHANGE UP (ref 7–14)
AST SERPL-CCNC: 67 U/L — HIGH (ref 0–41)
BILIRUB SERPL-MCNC: 2.1 MG/DL — HIGH (ref 0.2–1.2)
BUN SERPL-MCNC: 13 MG/DL — SIGNIFICANT CHANGE UP (ref 10–20)
CALCIUM SERPL-MCNC: 9.9 MG/DL — SIGNIFICANT CHANGE UP (ref 8.5–10.1)
CHLORIDE SERPL-SCNC: 96 MMOL/L — LOW (ref 98–110)
CO2 SERPL-SCNC: 28 MMOL/L — SIGNIFICANT CHANGE UP (ref 17–32)
CREAT SERPL-MCNC: 0.7 MG/DL — SIGNIFICANT CHANGE UP (ref 0.7–1.5)
CULTURE RESULTS: SIGNIFICANT CHANGE UP
CULTURE RESULTS: SIGNIFICANT CHANGE UP
GLUCOSE BLDC GLUCOMTR-MCNC: 102 MG/DL — HIGH (ref 70–99)
GLUCOSE BLDC GLUCOMTR-MCNC: 109 MG/DL — HIGH (ref 70–99)
GLUCOSE BLDC GLUCOMTR-MCNC: 117 MG/DL — HIGH (ref 70–99)
GLUCOSE BLDC GLUCOMTR-MCNC: 139 MG/DL — HIGH (ref 70–99)
GLUCOSE SERPL-MCNC: 116 MG/DL — HIGH (ref 70–99)
HCT VFR BLD CALC: 45.4 % — SIGNIFICANT CHANGE UP (ref 37–47)
HGB BLD-MCNC: 14.3 G/DL — SIGNIFICANT CHANGE UP (ref 12–16)
MCHC RBC-ENTMCNC: 26.1 PG — LOW (ref 27–31)
MCHC RBC-ENTMCNC: 31.5 G/DL — LOW (ref 32–37)
MCV RBC AUTO: 83 FL — SIGNIFICANT CHANGE UP (ref 81–99)
NRBC # BLD: 0 /100 WBCS — SIGNIFICANT CHANGE UP (ref 0–0)
PLATELET # BLD AUTO: 233 K/UL — SIGNIFICANT CHANGE UP (ref 130–400)
POTASSIUM SERPL-MCNC: 4.3 MMOL/L — SIGNIFICANT CHANGE UP (ref 3.5–5)
POTASSIUM SERPL-SCNC: 4.3 MMOL/L — SIGNIFICANT CHANGE UP (ref 3.5–5)
PROT SERPL-MCNC: 7 G/DL — SIGNIFICANT CHANGE UP (ref 6–8)
RBC # BLD: 5.47 M/UL — HIGH (ref 4.2–5.4)
RBC # FLD: 14.5 % — SIGNIFICANT CHANGE UP (ref 11.5–14.5)
SODIUM SERPL-SCNC: 137 MMOL/L — SIGNIFICANT CHANGE UP (ref 135–146)
SPECIMEN SOURCE: SIGNIFICANT CHANGE UP
SPECIMEN SOURCE: SIGNIFICANT CHANGE UP
WBC # BLD: 8.05 K/UL — SIGNIFICANT CHANGE UP (ref 4.8–10.8)
WBC # FLD AUTO: 8.05 K/UL — SIGNIFICANT CHANGE UP (ref 4.8–10.8)

## 2019-10-01 PROCEDURE — 99233 SBSQ HOSP IP/OBS HIGH 50: CPT

## 2019-10-01 RX ORDER — RIVAROXABAN 15 MG-20MG
20 KIT ORAL
Refills: 0 | Status: DISCONTINUED | OUTPATIENT
Start: 2019-10-01 | End: 2019-10-02

## 2019-10-01 RX ORDER — OXYCODONE AND ACETAMINOPHEN 5; 325 MG/1; MG/1
2 TABLET ORAL EVERY 6 HOURS
Refills: 0 | Status: DISCONTINUED | OUTPATIENT
Start: 2019-10-01 | End: 2019-10-02

## 2019-10-01 RX ORDER — CEPHALEXIN 500 MG
750 CAPSULE ORAL EVERY 8 HOURS
Refills: 0 | Status: DISCONTINUED | OUTPATIENT
Start: 2019-10-01 | End: 2019-10-02

## 2019-10-01 RX ADMIN — Medication 100 MILLIGRAM(S): at 10:34

## 2019-10-01 RX ADMIN — Medication 650 MILLIGRAM(S): at 11:05

## 2019-10-01 RX ADMIN — Medication 650 MILLIGRAM(S): at 06:43

## 2019-10-01 RX ADMIN — OXYCODONE AND ACETAMINOPHEN 2 TABLET(S): 5; 325 TABLET ORAL at 18:10

## 2019-10-01 RX ADMIN — OXYCODONE AND ACETAMINOPHEN 2 TABLET(S): 5; 325 TABLET ORAL at 17:39

## 2019-10-01 RX ADMIN — Medication 650 MILLIGRAM(S): at 12:00

## 2019-10-01 RX ADMIN — RIVAROXABAN 20 MILLIGRAM(S): KIT at 17:11

## 2019-10-01 RX ADMIN — HYDROMORPHONE HYDROCHLORIDE 0.5 MILLIGRAM(S): 2 INJECTION INTRAMUSCULAR; INTRAVENOUS; SUBCUTANEOUS at 01:48

## 2019-10-01 RX ADMIN — PANTOPRAZOLE SODIUM 40 MILLIGRAM(S): 20 TABLET, DELAYED RELEASE ORAL at 06:43

## 2019-10-01 RX ADMIN — Medication 30 MILLIGRAM(S): at 06:43

## 2019-10-01 RX ADMIN — Medication 750 MILLIGRAM(S): at 16:24

## 2019-10-01 RX ADMIN — Medication 12.5 MILLIGRAM(S): at 06:43

## 2019-10-01 NOTE — PROGRESS NOTE ADULT - PROBLEM SELECTOR PLAN 1
MSSA  dc home once cleared by surgery   PO Keflex 750 mg Q 8 - at least 7 days post op  recall if needed

## 2019-10-01 NOTE — GOALS OF CARE CONVERSATION - ADVANCED CARE PLANNING - CONVERSATION DETAILS
Patient's overall medical condition discussed with her and Son Dhaval.  She has Old CVA on long term anticoagulant and understand the need for antibiotics at present.  She remains FULL CODE Patient's overall medical condition discussed with her and Son Dhaval.  She has Old CVA on long term anticoagulant and understands the need for antibiotics at present and STR disposition.  She remains FULL CODE

## 2019-10-01 NOTE — CONSULT NOTE ADULT - SUBJECTIVE AND OBJECTIVE BOX
HPI:  69 YO F with a PMH of hernia repair who presents to the hospital for eval of right-sided flank pain for the past x 5 years. Progressively worsening. Associated with purulent discharge and a redness and swelling over the flank. Of note, the pt states that she had a hernia repair in the same area and that she never followed up with her surgeon because she thought they were making things worse. Denies any fevers, chills, cough, CP, SOB, dysuria, or constipation.   In the ED, the pt had a CT-AP performed which showed a possible enterocutaneous fistula      PTN  REFERRED TO ACUTE  REHAB  FOR  EVAL AND  TX   PAST MEDICAL & SURGICAL HISTORY:  Obesity  Diabetes mellitus  HTN (hypertension)  History of cholecystectomy  H/O hernia repair: 2011 and revised in 2013      Hospital Course:    TODAY'S SUBJECTIVE & REVIEW OF SYMPTOMS:     Constitutional WNL   Cardio WNL   Resp WNL   GI WNL  Heme WNL  Endo WNL  Skin WNL  MSK WNL  Neuro WNL  Cognitive WNL  Psych WNL      MEDICATIONS  (STANDING):  acetaminophen   Tablet .. 650 milliGRAM(s) Oral every 6 hours  ceFAZolin   IVPB 1000 milliGRAM(s) IV Intermittent every 8 hours  hydrochlorothiazide 12.5 milliGRAM(s) Oral daily  influenza   Vaccine 0.5 milliLiter(s) IntraMuscular once  NIFEdipine XL 30 milliGRAM(s) Oral daily  pantoprazole    Tablet 40 milliGRAM(s) Oral before breakfast  rivaroxaban 20 milliGRAM(s) Oral with dinner    MEDICATIONS  (PRN):  acetaminophen   Tablet .. 650 milliGRAM(s) Oral once PRN Mild Pain (1 - 3)  HYDROmorphone  Injectable 0.5 milliGRAM(s) IV Push every 10 minutes PRN Severe Pain (7 - 10)  ondansetron Injectable 4 milliGRAM(s) IV Push once PRN Nausea and/or Vomiting      FAMILY HISTORY:      Allergies    No Known Allergies    Intolerances        SOCIAL HISTORY:    [  ] Etoh  [  ] Smoking  [  ] Substance abuse     Home Environment:  [  x] Home Alone  [  ] Lives with Family  [  ] Home Health Aid    Dwelling:  [  ] Apartment  [ x ] Private House  [  ] Adult Home  [  ] Skilled Nursing Facility      [  ] Short Term  [  ] Long Term  [  x] Stairs       Elevator [  ]    FUNCTIONAL STATUS PTA: (Check all that apply)  Ambulation: [  x ]Independent    [  ] Dependent     [  ] Non-Ambulatory  Assistive Device: [  ] SA Cane  [  ]  Q Cane  [  x] Walker  [  ]  Wheelchair  ADL : [ x ] Independent  [  ]  Dependent       Vital Signs Last 24 Hrs  T(C): 36.6 (01 Oct 2019 05:55), Max: 37.3 (30 Sep 2019 20:36)  T(F): 97.8 (01 Oct 2019 05:55), Max: 99.1 (30 Sep 2019 20:36)  HR: 63 (01 Oct 2019 05:55) (63 - 78)  BP: 143/65 (01 Oct 2019 05:55) (137/71 - 185/83)  BP(mean): --  RR: 16 (01 Oct 2019 05:55) (15 - 78)  SpO2: --      PHYSICAL EXAM: Alert & Oriented X3  GENERAL: NAD, well-groomed, well-developed  HEAD:  Atraumatic, Normocephalic  EYES: EOMI, PERRLA, conjunctiva and sclera clear  NECK: Supple, No JVD, Normal thyroid  CHEST/LUNG: Clear to percussion bilaterally; No rales, rhonchi, wheezing, or rubs  HEART: Regular rate and rhythm; No murmurs, rubs, or gallops  ABDOMEN: Soft, Nontender, Nondistended; Bowel sounds present  EXTREMITIES:  2+ Peripheral Pulses, No clubbing, cyanosis, or edema    NERVOUS SYSTEM:  Cranial Nerves 2-12 intact [  x] Abnormal  [  ]  ROM: WFL all extremities [  ]  Abnormal [  x]  Motor Strength: WFL all extremities  [  ]  Abnormal [x  ]  Sensation: intact to light touch [  ] Abnormal [x  ]  Reflexes: Symmetric [  ]  Abnormal [x  ]    FUNCTIONAL STATUS:  Bed Mobility: Independent [  ]  Supervision [  ]  Needs Assistance [x  ]  N/A [  ]  Transfers: Independent [  ]  Supervision [  ]  Needs Assistance [x  ]  N/A [  ]   Ambulation: Independent [  ]  Supervision [  ]  Needs Assistance [  x]  N/A [  ]  ADL: Independent [  ] Requires Assistance [  ] N/A [  x]  SEE PT/OT IE NOTES    LABS:                        13.6   7.36  )-----------( 235      ( 30 Sep 2019 06:29 )             43.1     09-30    143  |  101  |  15  ----------------------------<  121<H>  3.6   |  30  |  0.7    Ca    10.0      30 Sep 2019 06:29      PT/INR - ( 30 Sep 2019 06:29 )   PT: 13.50 sec;   INR: 1.18 ratio               RADIOLOGY & ADDITIONAL STUDIES:    Assesment:

## 2019-10-01 NOTE — PROGRESS NOTE ADULT - SUBJECTIVE AND OBJECTIVE BOX
SUBJECTIVE:    Patient is a 68y old Female who presents with a chief complaint of ABD pain (30 Sep 2019 12:25)    Currently admitted to medicine with the primary diagnosis of Phlegmon     Today is hospital day 5d.   pt seen and examined at bedside this morning and in the afternoon in the presence of son Dhaval  -ID follow up noted  -will switch to oral antibiotics   -resumed xarelto today --- no need for repeat head CT and or Neurology consult, explained to son and he understands that pt is already on oral anticoagulant with no new deficits   -oob to chair   -skin care   -wound packing as per Surgical team         PATRICIA SPRAGUE  68y  Female      Patient is a 68y old  Female who presents with a chief complaint of ABD pain (01 Oct 2019 12:10)      INTERVAL HPI/OVERNIGHT EVENTS:    REVIEW OF SYSTEMS:  CONSTITUTIONAL: No fever, weight loss, or fatigue  EYES: No eye pain, visual disturbances, or discharge  NECK: No pain or stiffness  RESPIRATORY: No cough, wheezing, chills or hemoptysis; No shortness of breath  CARDIOVASCULAR: No chest pain, palpitations, dizziness, or leg swelling  GASTROINTESTINAL: No abdominal or epigastric pain. No nausea, vomiting, or hematemesis; No diarrhea or constipation. No melena or hematochezia.  GENITOURINARY: No dysuria, frequency, hematuria, or incontinence  NEUROLOGICAL: No headaches, memory loss, loss of strength, numbness, or tremors  MUSCULOSKELETAL: No joint pain or swelling; No muscle, back, or extremity pain      Vital Signs Last 24 Hrs  T(C): 36.6 (01 Oct 2019 05:55), Max: 37.3 (30 Sep 2019 20:36)  T(F): 97.8 (01 Oct 2019 05:55), Max: 99.1 (30 Sep 2019 20:36)  HR: 63 (01 Oct 2019 05:55) (63 - 78)  BP: 143/65 (01 Oct 2019 05:55) (137/71 - 185/83)  BP(mean): --  RR: 16 (01 Oct 2019 05:55) (15 - 78)  SpO2: --    PHYSICAL EXAM:  GENERAL: NAD, appears comfortable   HEAD:  Atraumatic, Normocephalic  EYES: EOMI, PERRLA, conjunctiva and sclera clear  NERVOUS SYSTEM:  Alert & Oriented X3  CHEST/LUNG: Clear to percussion bilaterally; No rales, rhonchi, wheezing, or rubs  CV/HEART: irregular rhythm  GI/ABDOMEN: Soft, Nontender, obese abdomen   EXTREMITIES:  2+ Peripheral Pulses, No clubbing, cyanosis, or edema  SKIN: No rashes or lesions    LAB:                        14.3   8.05  )-----------( 233      ( 01 Oct 2019 12:46 )             45.4     09-30    143  |  101  |  15  ----------------------------<  121<H>  3.6   |  30  |  0.7    Ca    10.0      30 Sep 2019 06:29      POCT Blood Glucose.: 139 mg/dL (01 Oct 2019 11:20)  POCT Blood Glucose.: 102 mg/dL (01 Oct 2019 07:28)  POCT Blood Glucose.: 118 mg/dL (30 Sep 2019 20:26)  POCT Blood Glucose.: 113 mg/dL (30 Sep 2019 16:11)      RADIOLOGY:    Imaging Personally Reviewed:  [ Y ] YES  [ ] NO    HEALTH ISSUES - PROBLEM Dx:  Abscess of abdominal wall: Abscess of abdominal wall  Enterocutaneous fistula: Enterocutaneous fistula    MEDS:  acetaminophen   Tablet .. 650 milliGRAM(s) Oral every 6 hours  acetaminophen   Tablet .. 650 milliGRAM(s) Oral once PRN  cephalexin 750 milliGRAM(s) Oral every 8 hours  hydrochlorothiazide 12.5 milliGRAM(s) Oral daily  HYDROmorphone  Injectable 0.5 milliGRAM(s) IV Push every 10 minutes PRN  influenza   Vaccine 0.5 milliLiter(s) IntraMuscular once  NIFEdipine XL 30 milliGRAM(s) Oral daily  ondansetron Injectable 4 milliGRAM(s) IV Push once PRN  pantoprazole    Tablet 40 milliGRAM(s) Oral before breakfast  rivaroxaban 20 milliGRAM(s) Oral with dinner

## 2019-10-01 NOTE — CONSULT NOTE ADULT - ASSESSMENT
IMPRESSION: Rehab of 67 y/o  f rehab  for debility      PRECAUTIONS: [  ] Cardiac  [  ] Respiratory  [  ] Seizures [  ] Contact Isolation  [  ] Droplet Isolation  [ FALL ] Other    Weight Bearing Status:     RECOMMENDATION:    Out of Bed to Chair     DVT/Decubiti Prophylaxis    REHAB PLAN:     [ xx  ] Bedside P/T 3-5 times a week   [   ]   Bedside O/T  2-3 times a week             [   ] No Rehab Therapy Indicated                   [   ]  Speech Therapy   Conditioning/ROM                                    ADL  Bed Mobility                                               Conditioning/ROM  Transfers                                                     Bed Mobility  Sitting /Standing Balance                         Transfers                                        Gait Training                                               Sitting/Standing Balance  Stair Training [   ]Applicable                    Home equipment Eval                                                                        Splinting  [   ] Only      GOALS:   ADL   [   ]   Independent                    Transfers  [   ] Independent                          Ambulation  [   ] Independent     [    ] With device                            [  x ]  CG                                                         [x   ]  CG                                                                  [x   ] CG                            [    ] Min A                                                   [   ] Min A                                                              [   ] Min  A          DISCHARGE PLAN:   [   ]  Good candidate for Intensive Rehabilitation/Hospital based-4A [facility]                                             Will tolerate 3hrs Intensive Rehab Daily                                       [   xx ]  Short Term Rehab in Skilled Nursing Facility                                       [    ]  Home with Outpatient or  services                                         [    ]  Possible Candidate for Intensive Hospital based Rehab
67 YO F with a PMH of hernia repair who presents to the hospital for eval of right-sided flank pain for the past x 5 years. Progressively worsening. Associated with purulent discharge and a redness and swelling over the flank. Of note, the pt states that she had a hernia repair in the same area and that she never followed up with her surgeon because she thought they were making things worse. Denies any fevers, chills, cough, CP, SOB, dysuria, or constipation.     1. Right flank open wound, infected  -? abscess on rt abdominal wall  -- IR did fistulogram-- no fistula noted  - -Wound cx showed staph aureus with mixed organisms  --  ID suggested --will add ABX after  drainage and repeat cultures   - Surgery will take her to OR for drainage of wound as there is copious discharge today     2. Elevated BP  -patient has not been taking medications for over a month as per son  - restarted  by medicine team     3. Morbid obesity  patient is noncompliant with drugs and diet  minimal mobility   will benefit PT/ rehab     4.  A fib on Xarelto  - held now, INR high ,was  given   Vit K prior to surgery     pt has not seen her PMD in many yrs ,getting refills from PMD Dr Citlali Rendon   will benefit from home visit   will consult social work
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 3.1 cm more focal area of fluid with enhancing along the right flank which may represent an early abscess.  Loop of colon closely adjacent with the previously noted inflammatory changes with suggestion of a possible enterocutaneous fistula along the right flank.    Pt with hx of hernia repair in 2011 and revised in 2013 ([place], [place]) brought to the ED by son for  wound check. As per son, the wound hasn't been healing right since 2013. Patient last saw her surgeon last early 2014 and  didn't want to go back. son brought her to ED for evaluation due purulent drainage from wound.      New problem with additional W/U  acute illness with systemic symptoms     Pt afebrile with normal wbc      DM    Bibasilar subsegmental atelectasis on atelectasis    Morbid Obesity (BMI>40)      PLAN  Hold off on further antibiotics (received zosyn) until input from IR  - Await blood cultures, abd wound culture  Surgical F/U
69 yo female hx of DM, HTN, A- fib on xarelto, obesity, hx of hernia repair in 2011 and revised in 2013 ([place], [place]) brought to the ED by son for  wound check. As per son, the wound hasn't been healing right since 2013. Patient last saw her surgeon last early 2014 and  didn't want to go back. son brought her to ED for evaluation due purulent drainage from wound. Denies fever/chill/HA/dizziness/chest pain/palpitation/sob/abd pain/n/v/d/ black stool/bloody stool/urinary symptoms.

## 2019-10-01 NOTE — PROGRESS NOTE ADULT - SUBJECTIVE AND OBJECTIVE BOX
PATRICIA SPRAGUE  68y, Female  Allergy: No Known Allergies      CHIEF COMPLAINT: ABD pain (30 Sep 2019 20:52)      INTERVAL EVENTS/HPI  - No acute events overnight  - T(F): , Max: 99.1 (09-30-19 @ 20:36)  - Denies any worsening symptoms  - Tolerating medication    ROS  10 system review - neg     SOCIAL HISTORY - not relevant   Substance Use (  ) never used  (  ) IVDU (  ) Other:  Tobacco Usage:  (   ) never smoked   (   ) former smoker   (   ) current smoker   Alcohol Usage: (   ) social  (   ) daily use (   ) denies  Sexual History:       FH noncontributory     VITALS:  T(F): 97.8, Max: 99.1 (09-30-19 @ 20:36)  HR: 63  BP: 143/65  RR: 16Vital Signs Last 24 Hrs  T(C): 36.6 (01 Oct 2019 05:55), Max: 37.3 (30 Sep 2019 20:36)  T(F): 97.8 (01 Oct 2019 05:55), Max: 99.1 (30 Sep 2019 20:36)  HR: 63 (01 Oct 2019 05:55) (63 - 78)  BP: 143/65 (01 Oct 2019 05:55) (137/71 - 185/83)  BP(mean): --  RR: 16 (01 Oct 2019 05:55) (15 - 78)  SpO2: --    PHYSICAL EXAM:  Gen: NAD, resting in bed  HEENT: Normocephalic, atraumatic  Neck: supple, no lymphadenopathy  CV: s1 s 2 +   Lungs: decreased BS  Abdomen: Soft, BS present. obese,. dressed wounds   Ext: Warm, well perfused  Neuro: non focal, awake  Skin: no rash, no erythema      TESTS & MEASUREMENTS:                        13.6   7.36  )-----------( 235      ( 30 Sep 2019 06:29 )             43.1     09-30    143  |  101  |  15  ----------------------------<  121<H>  3.6   |  30  |  0.7    Ca    10.0      30 Sep 2019 06:29              Culture - Other (collected 09-26-19 @ 14:10)  Source: .Other wound  Final Report (09-28-19 @ 22:02):    Culture yields growth of greater than 3 colony types of    bacteria,  which may indicate contamination and normal rosalva    Call client services within 7 days if further workup is clinically    indicated.    Culture includes    Few Staphylococcus aureus  Organism: Staphylococcus aureus (09-28-19 @ 22:02)  Organism: Staphylococcus aureus (09-28-19 @ 22:02)      -  Ampicillin/Sulbactam: S <=8/4      -  Cefazolin: S <=4      -  Clindamycin: S <=0.25      -  Erythromycin: S <=0.25      -  Gentamicin: S <=1 Should not be used as monotherapy      -  Oxacillin: S <=0.25      -  RIF- Rifampin: S <=1 Should not be used as monotherapy      -  Tetra/Doxy: S <=1      -  Trimethoprim/Sulfamethoxazole: S <=0.5/9.5      -  Vancomycin: S 2      Method Type: NAGI    Culture - Blood (collected 09-25-19 @ 22:15)  Source: .Blood Blood  Preliminary Report (09-27-19 @ 18:01):    No growth to date.    Culture - Blood (collected 09-25-19 @ 22:15)  Source: .Blood Blood  Preliminary Report (09-27-19 @ 18:01):    No growth to date.    Culture - Other (collected 09-25-19 @ 21:10)  Source: .Other right abdominal wall wound  Final Report (09-28-19 @ 19:23):    Numerous Staphylococcus aureus  Organism: Staphylococcus aureus (09-28-19 @ 19:23)  Organism: Staphylococcus aureus (09-28-19 @ 19:23)      -  Ampicillin/Sulbactam: S <=8/4      -  Cefazolin: S <=4      -  Clindamycin: S 0.5      -  Erythromycin: S <=0.25      -  Gentamicin: S <=1 Should not be used as monotherapy      -  Oxacillin: S <=0.25      -  RIF- Rifampin: S <=1 Should not be used as monotherapy      -  Tetra/Doxy: S <=1      -  Trimethoprim/Sulfamethoxazole: S <=0.5/9.5      -  Vancomycin: S 2      Method Type: NAGI            INFECTIOUS DISEASES TESTING  Hepatitis C Virus Interpretation: Nonreact (09-27-19 @ 07:37)      RADIOLOGY & ADDITIONAL TESTS:      CARDIOLOGY TESTING  12 Lead ECG:   Ventricular Rate 58 BPM    Atrial Rate 90 BPM    QRS Duration 108 ms    Q-T Interval 434 ms    QTC Calculation(Bezet) 426 ms    R Axis -20 degrees    T Axis 32 degrees    Diagnosis Line Atrial fibrillation with slow ventricular response  Possible Anteroseptal infarct , age undetermined  Abnormal ECG    Confirmed by RENETTA PRITCHARD, ARETHA ([id number]) on 9/30/2019 5:46:21 PM (09-29-19 @ 13:57)      MEDICATIONS  acetaminophen   Tablet .. 650  ceFAZolin   IVPB 1000  hydrochlorothiazide 12.5  influenza   Vaccine 0.5  NIFEdipine XL 30  pantoprazole    Tablet 40  rivaroxaban 20      ANTIBIOTICS:  ceFAZolin   IVPB 1000 milliGRAM(s) IV Intermittent every 8 hours

## 2019-10-01 NOTE — PROGRESS NOTE ADULT - ASSESSMENT
69 YO F with a PMH of hernia repair who presents to the hospital for eval of right-sided flank pain for the past x 5 years. on.     s/p  Excision of sinus tract of abdominal wall sinus tract x 4, anterior abdominal wall     - ABX AS PER ID  - REG DIET   - DRESSING CHANGE BY RN

## 2019-10-01 NOTE — CONSULT NOTE ADULT - CONSULT REASON
Enterocutaneous Fistula
Enterocutaneous fistula along the   right flank.
establishment of home visit
fistula
weakens  debility

## 2019-10-01 NOTE — CHART NOTE - NSCHARTNOTEFT_GEN_A_CORE
Patient seen at bedside -  resting comfortably.    IV placed this AM. S/p OR for drainage yesterday - now on IV abx.     Patient without questions or concerns at this time.     Patient encouraged to contact PA with any further questions or concerns.

## 2019-10-01 NOTE — PROGRESS NOTE ADULT - ASSESSMENT
69 YO F with a PMH of hernia repair who presents to the hospital for eval of right-sided flank pain for the past x 5 years. Progressively worsening. Associated with purulent discharge and a redness and swelling over the flank. Of note, the pt states that she had a hernia repair in the same area and that she never followed up with her surgeon because she thought they were making things worse. Denies any fevers, chills, cough, CP, SOB, dysuria, or constipation.     1. Right flank open wound, infected  -? abscess on rt abdominal wall-- IR did fistulogram-- no fistula noted  - Wound cx showed staph aureus with mixed organisms--  ID suggested -- s/p Excision of sinus tract of abdominal wall sinus tract x 4, anterior abdominal wall   - ID follow up noted  -now on oral antibiotics keflex as per ID     2. HTN  -controlled with current regimen     3. Morbid obesity  -weight loss and diet modification     4. Chronic AFIB  -long term anticoagulant; resumed xarelto today    5. Old CVA with deficits   -on xarelto   -no need for repeat head CT     6. Debility  -oob to chair   -rehab/pt    # Progress Note Handoff  PENDING as follows  consults: ID and surgery noted   Test: wound cultures   Family discussion: discussed with patient and son Dhaval at bedside;   Disposition:   STR in 24 hrs     Attending Physician Dr. Lupe Angel # [id number]

## 2019-10-01 NOTE — PROGRESS NOTE ADULT - SUBJECTIVE AND OBJECTIVE BOX
SURGERY FOLLOW UP NOTE     s/p  Excision of sinus tract of abdominal wall sinus tract x 4, anterior abdominal wall     68y old Female who presents with a chief complaint of ABD pain  DENIES ANY FEVER/ CP / SOB / NVD     PAST MEDICAL & SURGICAL HISTORY  Obesity  Diabetes mellitus  HTN (hypertension)  History of cholecystectomy  H/O hernia repair: 2011 and revised in 2013    ALLERGIES:  No Known Allergies    Vital Signs Last 24 Hrs  T(C): 36.6 (01 Oct 2019 05:55), Max: 37.3 (30 Sep 2019 20:36)  T(F): 97.8 (01 Oct 2019 05:55), Max: 99.1 (30 Sep 2019 20:36)  HR: 63 (01 Oct 2019 05:55) (63 - 78)  BP: 143/65 (01 Oct 2019 05:55) (137/71 - 185/83)  BP(mean): --  RR: 16 (01 Oct 2019 05:55) (15 - 78)              PHYSICAL EXAM:  GEN: No acute distress  LUNGS: Clear to auscultation bilaterally   HEART: S1/S2 present. RRR.   ABD/ GI: Soft, non-tender,  Right ABD REGION WITH DRESSING, (LIFTED) - MULTIPLE AREAS OF SURGICAL SITES WITH PACKING   NEURO: AAOX3        09-30    143  |  101  |  15  ----------------------------<  121<H>  3.6   |  30  |  0.7    Ca    10.0      30 Sep 2019 06:29                              13.6   7.36  )-----------( 235      ( 30 Sep 2019 06:29 )             43.1     CAPILLARY BLOOD GLUCOSE      POCT Blood Glucose.: 141 mg/dL (30 Sep 2019 08:30)

## 2019-10-02 ENCOUNTER — TRANSCRIPTION ENCOUNTER (OUTPATIENT)
Age: 69
End: 2019-10-02

## 2019-10-02 VITALS
SYSTOLIC BLOOD PRESSURE: 157 MMHG | TEMPERATURE: 98 F | HEART RATE: 75 BPM | RESPIRATION RATE: 16 BRPM | DIASTOLIC BLOOD PRESSURE: 76 MMHG

## 2019-10-02 LAB
ANION GAP SERPL CALC-SCNC: 13 MMOL/L — SIGNIFICANT CHANGE UP (ref 7–14)
BUN SERPL-MCNC: 19 MG/DL — SIGNIFICANT CHANGE UP (ref 10–20)
CALCIUM SERPL-MCNC: 9.8 MG/DL — SIGNIFICANT CHANGE UP (ref 8.5–10.1)
CHLORIDE SERPL-SCNC: 97 MMOL/L — LOW (ref 98–110)
CO2 SERPL-SCNC: 28 MMOL/L — SIGNIFICANT CHANGE UP (ref 17–32)
CREAT SERPL-MCNC: 0.8 MG/DL — SIGNIFICANT CHANGE UP (ref 0.7–1.5)
GLUCOSE BLDC GLUCOMTR-MCNC: 104 MG/DL — HIGH (ref 70–99)
GLUCOSE BLDC GLUCOMTR-MCNC: 120 MG/DL — HIGH (ref 70–99)
GLUCOSE SERPL-MCNC: 113 MG/DL — HIGH (ref 70–99)
HCT VFR BLD CALC: 44 % — SIGNIFICANT CHANGE UP (ref 37–47)
HGB BLD-MCNC: 14 G/DL — SIGNIFICANT CHANGE UP (ref 12–16)
MCHC RBC-ENTMCNC: 26 PG — LOW (ref 27–31)
MCHC RBC-ENTMCNC: 31.8 G/DL — LOW (ref 32–37)
MCV RBC AUTO: 81.8 FL — SIGNIFICANT CHANGE UP (ref 81–99)
NRBC # BLD: 0 /100 WBCS — SIGNIFICANT CHANGE UP (ref 0–0)
PLATELET # BLD AUTO: 209 K/UL — SIGNIFICANT CHANGE UP (ref 130–400)
POTASSIUM SERPL-MCNC: 3.3 MMOL/L — LOW (ref 3.5–5)
POTASSIUM SERPL-SCNC: 3.3 MMOL/L — LOW (ref 3.5–5)
RBC # BLD: 5.38 M/UL — SIGNIFICANT CHANGE UP (ref 4.2–5.4)
RBC # FLD: 14.6 % — HIGH (ref 11.5–14.5)
SODIUM SERPL-SCNC: 138 MMOL/L — SIGNIFICANT CHANGE UP (ref 135–146)
VANCOMYCIN TROUGH SERPL-MCNC: <4 UG/ML — LOW (ref 5–10)
WBC # BLD: 8.98 K/UL — SIGNIFICANT CHANGE UP (ref 4.8–10.8)
WBC # FLD AUTO: 8.98 K/UL — SIGNIFICANT CHANGE UP (ref 4.8–10.8)

## 2019-10-02 PROCEDURE — 99239 HOSP IP/OBS DSCHRG MGMT >30: CPT

## 2019-10-02 RX ORDER — NIFEDIPINE 30 MG
1 TABLET, EXTENDED RELEASE 24 HR ORAL
Qty: 0 | Refills: 0 | DISCHARGE
Start: 2019-10-02

## 2019-10-02 RX ORDER — PANTOPRAZOLE SODIUM 20 MG/1
1 TABLET, DELAYED RELEASE ORAL
Qty: 0 | Refills: 0 | DISCHARGE
Start: 2019-10-02

## 2019-10-02 RX ORDER — FONDAPARINUX SODIUM 2.5 MG/.5ML
1 INJECTION, SOLUTION SUBCUTANEOUS
Qty: 0 | Refills: 0 | DISCHARGE

## 2019-10-02 RX ORDER — RIVAROXABAN 15 MG-20MG
1 KIT ORAL
Qty: 0 | Refills: 0 | DISCHARGE
Start: 2019-10-02

## 2019-10-02 RX ORDER — POTASSIUM CHLORIDE 20 MEQ
20 PACKET (EA) ORAL ONCE
Refills: 0 | Status: COMPLETED | OUTPATIENT
Start: 2019-10-02 | End: 2019-10-02

## 2019-10-02 RX ORDER — CEPHALEXIN 500 MG
1 CAPSULE ORAL
Qty: 0 | Refills: 0 | DISCHARGE
Start: 2019-10-02

## 2019-10-02 RX ORDER — METOPROLOL TARTRATE 50 MG
1 TABLET ORAL
Qty: 0 | Refills: 0 | DISCHARGE

## 2019-10-02 RX ORDER — OXYCODONE AND ACETAMINOPHEN 5; 325 MG/1; MG/1
1 TABLET ORAL
Qty: 0 | Refills: 0 | DISCHARGE
Start: 2019-10-02

## 2019-10-02 RX ADMIN — PANTOPRAZOLE SODIUM 40 MILLIGRAM(S): 20 TABLET, DELAYED RELEASE ORAL at 05:15

## 2019-10-02 RX ADMIN — Medication 750 MILLIGRAM(S): at 01:53

## 2019-10-02 RX ADMIN — Medication 12.5 MILLIGRAM(S): at 05:15

## 2019-10-02 RX ADMIN — HYDROMORPHONE HYDROCHLORIDE 0.5 MILLIGRAM(S): 2 INJECTION INTRAMUSCULAR; INTRAVENOUS; SUBCUTANEOUS at 01:52

## 2019-10-02 RX ADMIN — HYDROMORPHONE HYDROCHLORIDE 0.5 MILLIGRAM(S): 2 INJECTION INTRAMUSCULAR; INTRAVENOUS; SUBCUTANEOUS at 01:54

## 2019-10-02 RX ADMIN — INFLUENZA VIRUS VACCINE 0.5 MILLILITER(S): 15; 15; 15; 15 SUSPENSION INTRAMUSCULAR at 12:29

## 2019-10-02 RX ADMIN — Medication 750 MILLIGRAM(S): at 10:14

## 2019-10-02 RX ADMIN — Medication 20 MILLIEQUIVALENT(S): at 14:33

## 2019-10-02 RX ADMIN — Medication 650 MILLIGRAM(S): at 01:54

## 2019-10-02 RX ADMIN — Medication 30 MILLIGRAM(S): at 05:15

## 2019-10-02 NOTE — PROGRESS NOTE ADULT - REASON FOR ADMISSION
ABD pain

## 2019-10-02 NOTE — PROGRESS NOTE ADULT - SUBJECTIVE AND OBJECTIVE BOX
SUBJECTIVE:    Patient is a 68y old Female who presents with a chief complaint of ABD pain (30 Sep 2019 12:25)    Currently admitted to medicine with the primary diagnosis of Phlegmon     PATRICIA SPRAGUE  68y  Female      Patient is a 68y old  Female who presents with a chief complaint of ABD pain (01 Oct 2019 12:10)      INTERVAL HPI/OVERNIGHT EVENTS:    Today is hospital day 6d.   pt seen and examined at bedside this morning; doing well and is sitting up in chair  -discussed with son in length and went over all labs and imaging studies with him.   -patient is stable for NH d/c today; wound care/packing instructions given to nursing home   -pt will continue oral anticoagulation and follow up with her cardiologist in the community   -oob to chair as tolerated with Physical therapist       PHYSICAL EXAM:  GENERAL: NAD, comfortable sitting up in chair   HEAD:  Atraumatic, Normocephalic  EYES: EOMI, PERRLA, conjunctiva and sclera clear  NERVOUS SYSTEM:  Alert & Oriented X3  CHEST/LUNG: Clear to percussion bilaterally; No rales, rhonchi, wheezing, or rubs  CV/HEART: irregular rhythm  GI/ABDOMEN: Soft, Nontender, obese abdomen   EXTREMITIES:  2+ Peripheral Pulses, No clubbing, cyanosis, or edema  SKIN: No rashes or lesions    LAB:                        14.3   8.05  )-----------( 233      ( 01 Oct 2019 12:46 )             45.4     09-30    143  |  101  |  15  ----------------------------<  121<H>  3.6   |  30  |  0.7    Ca    10.0      30 Sep 2019 06:29      POCT Blood Glucose.: 139 mg/dL (01 Oct 2019 11:20)  POCT Blood Glucose.: 102 mg/dL (01 Oct 2019 07:28)  POCT Blood Glucose.: 118 mg/dL (30 Sep 2019 20:26)  POCT Blood Glucose.: 113 mg/dL (30 Sep 2019 16:11)      RADIOLOGY:    Imaging Personally Reviewed:  [ Y ] YES  [ ] NO    HEALTH ISSUES - PROBLEM Dx:  Abscess of abdominal wall: Abscess of abdominal wall  Enterocutaneous fistula: Enterocutaneous fistula    MEDICATIONS  (STANDING):  acetaminophen   Tablet .. 650 milliGRAM(s) Oral every 6 hours  cephalexin 750 milliGRAM(s) Oral every 8 hours  hydrochlorothiazide 12.5 milliGRAM(s) Oral daily  influenza   Vaccine 0.5 milliLiter(s) IntraMuscular once  NIFEdipine XL 30 milliGRAM(s) Oral daily  pantoprazole    Tablet 40 milliGRAM(s) Oral before breakfast  rivaroxaban 20 milliGRAM(s) Oral with dinner    MEDICATIONS  (PRN):  acetaminophen   Tablet .. 650 milliGRAM(s) Oral once PRN Mild Pain (1 - 3)  ondansetron Injectable 4 milliGRAM(s) IV Push once PRN Nausea and/or Vomiting  oxyCODONE    5 mG/acetaminophen 325 mG 2 Tablet(s) Oral every 6 hours PRN Moderate Pain (4 - 6)

## 2019-10-02 NOTE — DISCHARGE NOTE NURSING/CASE MANAGEMENT/SOCIAL WORK - PATIENT PORTAL LINK FT
You can access the FollowMyHealth Patient Portal offered by [facility] by registering at the following website: [email or web address]. By joining [employer]’s FollowMyHealth portal, you will also be able to view your health information using other applications (apps) compatible with our system.

## 2019-10-02 NOTE — PROGRESS NOTE ADULT - ASSESSMENT
67 YO F with a PMH of hernia repair who presents to the hospital for eval of right-sided flank pain for the past x 5 years. Progressively worsening. Associated with purulent discharge and a redness and swelling over the flank. Of note, the pt states that she had a hernia repair in the same area and that she never followed up with her surgeon because she thought they were making things worse. Denies any fevers, chills, cough, CP, SOB, dysuria, or constipation.     1. Right flank open wound, infected  -? abscess on rt abdominal wall-- IR did fistulogram-- no fistula noted  - Wound cx showed staph aureus with mixed organisms--  ID suggested -- s/p Excision of sinus tract of abdominal wall sinus tract x 4, anterior abdominal wall   - ID follow up noted  -now on oral antibiotics keflex as per ID --- for 7 more days     2. HTN  -controlled with current regimen     3. Morbid obesity  -weight loss and diet modification     4. Chronic AFIB  -long term anticoagulant; resumed xarelto yesterday     5. Old CVA with deficits   -on xarelto   -no need for repeat head CT     6. Debility  -oob to chair   -rehab/pt    # Progress Note Handoff  PENDING as follows  consults: ID and surgery noted   Test: wound cultures   Family discussion: discussed with patient and son Dhaval at bedside; await today's discharge   Disposition:   STR today     Attending Physician Dr. Lupe Angel # [id number] 67 YO F with a PMH of hernia repair who presents to the hospital for eval of right-sided flank pain for the past x 5 years. Progressively worsening. Associated with purulent discharge and a redness and swelling over the flank. Of note, the pt states that she had a hernia repair in the same area and that she never followed up with her surgeon because she thought they were making things worse. Denies any fevers, chills, cough, CP, SOB, dysuria, or constipation.     1. Right flank open wound, infected  -? abscess on rt abdominal wall-- IR did fistulogram-- no fistula noted  - Wound cx showed staph aureus with mixed organisms--  ID suggested -- s/p Excision of sinus tract of abdominal wall sinus tract x 4, anterior abdominal wall   - ID follow up noted  -now on oral antibiotics keflex as per ID --- for 7 more days     2. HTN  -controlled with current regimen     3. Morbid obesity  -weight loss and diet modification     4. Chronic AFIB  -long term anticoagulant; resumed xarelto yesterday     5. Old CVA with deficits   -on xarelto   -no need for repeat head CT     6. Debility  -oob to chair   -rehab/pt    7. Suspected CKD stage III  -needs nephrology follow up and kidney monitoring     8. Hypokalemia   -repleting with oral supplement     # Progress Note Handoff  PENDING as follows  consults: ID and surgery noted   Test: wound cultures   Family discussion: discussed with patient and son Dhaval at bedside; await today's discharge   Disposition:   STR today     Attending Physician Dr. Lupe Angel # [id number]

## 2019-10-02 NOTE — DISCHARGE NOTE PROVIDER - HOSPITAL COURSE
69 YO F with a PMH of hernia repair who presents to the hospital for eval of right-sided flank pain for the past x 5 years. Progressively worsening. Associated with purulent discharge and a redness and swelling over the flank. Of note, the pt states that she had a hernia repair in the same area and that she never followed up with her surgeon because she thought they were making things worse. Denies any fevers, chills, cough, CP, SOB, dysuria, or constipation.         In the ED, the pt had a CT-AP performed which showed a possible enterocutaneous fistula         patient admitted to medical floor, seen by Surgical team and s/p Excision of sinus tract of abdominal wall sinus tract x 4, anterior abdominal wall; wound packing instructions given to nursing home. Pt was initially on IV antibiotics (IV zosyn and IV vanco); switched to oral keflex as per ID Attending's recommendations.    -pt did well during hospital course; care discussed with son in length and aware of the plan of care and outpatient follow ups     -pt seen and examined on the day of discharge 67 YO F with a PMH of hernia repair who presents to the hospital for eval of right-sided flank pain for the past x 5 years. Progressively worsening. Associated with purulent discharge and a redness and swelling over the flank. Of note, the pt states that she had a hernia repair in the same area and that she never followed up with her surgeon because she thought they were making things worse. Denies any fevers, chills, cough, CP, SOB, dysuria, or constipation.         In the ED, the pt had a CT-AP performed which showed a possible enterocutaneous fistula         patient admitted to medical floor, seen by Surgical team and s/p Excision of sinus tract of abdominal wall sinus tract x 4, anterior abdominal wall; wound packing instructions given to nursing home. Pt was initially on IV antibiotics (IV zosyn and IV vanco); switched to oral keflex as per ID Attending's recommendations.    -pt did well during hospital course; care discussed with son in length and aware of the plan of care and outpatient follow ups     -pt seen and examined on the day of discharge    -d/c papers done by me        spent over 35 mins d/c planning

## 2019-10-02 NOTE — DISCHARGE NOTE PROVIDER - NSDCCPCAREPLAN_GEN_ALL_CORE_FT
PRINCIPAL DISCHARGE DIAGNOSIS  Diagnosis: Abscess of abdominal wall  Assessment and Plan of Treatment: complete course of antibiotics as prescribed. wound care/packing as instructed

## 2019-10-02 NOTE — PROGRESS NOTE ADULT - PROVIDER SPECIALTY LIST ADULT
Hospitalist
Infectious Disease
Infectious Disease
Intervent Radiology
Surgery
Hospitalist
Surgery
Surgery

## 2019-10-03 ENCOUNTER — OUTPATIENT (OUTPATIENT)
Dept: OUTPATIENT SERVICES | Facility: HOSPITAL | Age: 69
LOS: 1 days | Discharge: HOME | End: 2019-10-03

## 2019-10-03 DIAGNOSIS — Z90.49 ACQUIRED ABSENCE OF OTHER SPECIFIED PARTS OF DIGESTIVE TRACT: Chronic | ICD-10-CM

## 2019-10-03 DIAGNOSIS — Z98.890 OTHER SPECIFIED POSTPROCEDURAL STATES: Chronic | ICD-10-CM

## 2019-10-03 DIAGNOSIS — R10.9 UNSPECIFIED ABDOMINAL PAIN: ICD-10-CM

## 2019-10-03 LAB
-  AMPICILLIN/SULBACTAM: SIGNIFICANT CHANGE UP
-  CEFAZOLIN: SIGNIFICANT CHANGE UP
-  CLINDAMYCIN: SIGNIFICANT CHANGE UP
-  ERYTHROMYCIN: SIGNIFICANT CHANGE UP
-  GENTAMICIN: SIGNIFICANT CHANGE UP
-  OXACILLIN: SIGNIFICANT CHANGE UP
-  RIFAMPIN: SIGNIFICANT CHANGE UP
-  TETRACYCLINE: SIGNIFICANT CHANGE UP
-  TRIMETHOPRIM/SULFAMETHOXAZOLE: SIGNIFICANT CHANGE UP
-  VANCOMYCIN: SIGNIFICANT CHANGE UP
CULTURE RESULTS: SIGNIFICANT CHANGE UP
CULTURE RESULTS: SIGNIFICANT CHANGE UP
METHOD TYPE: SIGNIFICANT CHANGE UP
ORGANISM # SPEC MICROSCOPIC CNT: SIGNIFICANT CHANGE UP
ORGANISM # SPEC MICROSCOPIC CNT: SIGNIFICANT CHANGE UP
SPECIMEN SOURCE: SIGNIFICANT CHANGE UP
SPECIMEN SOURCE: SIGNIFICANT CHANGE UP
SURGICAL PATHOLOGY STUDY: SIGNIFICANT CHANGE UP

## 2019-10-03 NOTE — ED ADULT NURSE NOTE - NS ED NURSE PATIENT LEFT UNIT TIME
"   10/03/19 1423   Behavioral Health   Hallucinations denies / not responding to hallucinations   Thinking paranoid;delusional;confused;distractable   Orientation time: oriented;person: oriented   Memory other (see comment)  (AFSHAN)   Insight poor;denial of illness   Judgement impaired   Eye Contact at examiner   Affect tense;irritable   Mood anxious;irritable   Physical Appearance/Attire disheveled   Hygiene neglected grooming - unclean body, hair, teeth   Suicidality other (see comments)  (AFSHAN)   1. Wish to be Dead (Past Month) No   2. Non-Specific Active Suicidal Thoughts (Past Month) No   Self Injury other (see comment)  (Nothing observed)   Activity isolative;withdrawn   Speech rambling;pressured   Medication Sensitivity no observed side effects   Psychomotor / Gait balanced;steady   Activities of Daily Living   Hygiene/Grooming prompts   Oral Hygiene prompts   Dress scrubs (behavioral health)   Laundry unable to complete   Room Organization independent   pt was irritable and anxious and kept asking to speak to the doctor even thought she was told many times that her doctor is not here today. Pt state she is good to go home. Pt still appears delusion and paranoid, calling  staff jeannine.  And also saying, \"stop passing by my room and stop calling me Michelle\".  No SI and SIB was noted this shift.    " 06:55

## 2019-10-04 PROBLEM — E66.9 OBESITY, UNSPECIFIED: Chronic | Status: ACTIVE | Noted: 2019-09-26

## 2019-10-04 PROBLEM — I10 ESSENTIAL (PRIMARY) HYPERTENSION: Chronic | Status: ACTIVE | Noted: 2019-09-26

## 2019-10-12 DIAGNOSIS — Z91.14 PATIENT'S OTHER NONCOMPLIANCE WITH MEDICATION REGIMEN: ICD-10-CM

## 2019-10-12 DIAGNOSIS — E66.9 OBESITY, UNSPECIFIED: ICD-10-CM

## 2019-10-12 DIAGNOSIS — N18.3 CHRONIC KIDNEY DISEASE, STAGE 3 (MODERATE): ICD-10-CM

## 2019-10-12 DIAGNOSIS — B95.61 METHICILLIN SUSCEPTIBLE STAPHYLOCOCCUS AUREUS INFECTION AS THE CAUSE OF DISEASES CLASSIFIED ELSEWHERE: ICD-10-CM

## 2019-10-12 DIAGNOSIS — L76.82 OTHER POSTPROCEDURAL COMPLICATIONS OF SKIN AND SUBCUTANEOUS TISSUE: ICD-10-CM

## 2019-10-12 DIAGNOSIS — E11.22 TYPE 2 DIABETES MELLITUS WITH DIABETIC CHRONIC KIDNEY DISEASE: ICD-10-CM

## 2019-10-12 DIAGNOSIS — I12.9 HYPERTENSIVE CHRONIC KIDNEY DISEASE WITH STAGE 1 THROUGH STAGE 4 CHRONIC KIDNEY DISEASE, OR UNSPECIFIED CHRONIC KIDNEY DISEASE: ICD-10-CM

## 2019-10-12 DIAGNOSIS — Z23 ENCOUNTER FOR IMMUNIZATION: ICD-10-CM

## 2019-10-12 DIAGNOSIS — I48.20 CHRONIC ATRIAL FIBRILLATION, UNSPECIFIED: ICD-10-CM

## 2019-10-12 DIAGNOSIS — E87.6 HYPOKALEMIA: ICD-10-CM

## 2019-10-12 DIAGNOSIS — Z18.89 OTHER SPECIFIED RETAINED FOREIGN BODY FRAGMENTS: ICD-10-CM

## 2019-10-12 DIAGNOSIS — L92.3 FOREIGN BODY GRANULOMA OF THE SKIN AND SUBCUTANEOUS TISSUE: ICD-10-CM

## 2019-10-12 DIAGNOSIS — L02.211 CUTANEOUS ABSCESS OF ABDOMINAL WALL: ICD-10-CM

## 2019-10-12 DIAGNOSIS — Z79.01 LONG TERM (CURRENT) USE OF ANTICOAGULANTS: ICD-10-CM

## 2019-11-14 ENCOUNTER — OUTPATIENT (OUTPATIENT)
Dept: OUTPATIENT SERVICES | Facility: HOSPITAL | Age: 69
LOS: 1 days | Discharge: HOME | End: 2019-11-14

## 2019-11-14 DIAGNOSIS — Z90.49 ACQUIRED ABSENCE OF OTHER SPECIFIED PARTS OF DIGESTIVE TRACT: Chronic | ICD-10-CM

## 2019-11-14 DIAGNOSIS — Z98.890 OTHER SPECIFIED POSTPROCEDURAL STATES: Chronic | ICD-10-CM

## 2019-11-15 DIAGNOSIS — R79.9 ABNORMAL FINDING OF BLOOD CHEMISTRY, UNSPECIFIED: ICD-10-CM

## 2019-11-16 ENCOUNTER — OUTPATIENT (OUTPATIENT)
Dept: OUTPATIENT SERVICES | Facility: HOSPITAL | Age: 69
LOS: 1 days | Discharge: HOME | End: 2019-11-16

## 2019-11-16 DIAGNOSIS — R79.9 ABNORMAL FINDING OF BLOOD CHEMISTRY, UNSPECIFIED: ICD-10-CM

## 2019-11-16 DIAGNOSIS — Z98.890 OTHER SPECIFIED POSTPROCEDURAL STATES: Chronic | ICD-10-CM

## 2019-11-16 DIAGNOSIS — Z90.49 ACQUIRED ABSENCE OF OTHER SPECIFIED PARTS OF DIGESTIVE TRACT: Chronic | ICD-10-CM

## 2019-12-05 NOTE — PHYSICAL THERAPY INITIAL EVALUATION ADULT - GENERAL OBSERVATIONS, REHAB EVAL
9:30 - 10:00. Chart reviewed. Patient available to be seen for physical therapy. Patient encountered already out of bed in chair. Denies pain at rest, would like to walk with PT now.
1.98

## 2019-12-06 ENCOUNTER — INPATIENT (INPATIENT)
Facility: HOSPITAL | Age: 69
LOS: 16 days | Discharge: NURSING, REHAB OR LONG-TERM CARE | End: 2019-12-23
Attending: INTERNAL MEDICINE | Admitting: INTERNAL MEDICINE
Payer: COMMERCIAL

## 2019-12-06 VITALS
HEART RATE: 67 BPM | TEMPERATURE: 98 F | SYSTOLIC BLOOD PRESSURE: 145 MMHG | WEIGHT: 238.1 LBS | RESPIRATION RATE: 20 BRPM | OXYGEN SATURATION: 98 % | HEIGHT: 66 IN | DIASTOLIC BLOOD PRESSURE: 75 MMHG

## 2019-12-06 DIAGNOSIS — Z90.49 ACQUIRED ABSENCE OF OTHER SPECIFIED PARTS OF DIGESTIVE TRACT: Chronic | ICD-10-CM

## 2019-12-06 DIAGNOSIS — Z98.890 OTHER SPECIFIED POSTPROCEDURAL STATES: Chronic | ICD-10-CM

## 2019-12-06 PROCEDURE — 99285 EMERGENCY DEPT VISIT HI MDM: CPT

## 2019-12-06 RX ORDER — AMLODIPINE BESYLATE 2.5 MG/1
1 TABLET ORAL
Qty: 0 | Refills: 0 | DISCHARGE

## 2019-12-06 NOTE — ED ADULT TRIAGE NOTE - CHIEF COMPLAINT QUOTE
pt sts she was D/C from [facility] 3 days ago after a 2 month stay continues to feel weak unable to ambulate

## 2019-12-06 NOTE — ED ADULT NURSE NOTE - CHIEF COMPLAINT QUOTE
pt sts she was D/C from [facility] 3 days ago after a 2 month stay continues to feel weak unable to ambulate

## 2019-12-06 NOTE — ED ADULT NURSE NOTE - NSIMPLEMENTINTERV_GEN_ALL_ED
Implemented All Fall Risk Interventions:  [place] to call system. Call bell, personal items and telephone within reach. Instruct patient to call for assistance. Room bathroom lighting operational. Non-slip footwear when patient is off stretcher. Physically safe environment: no spills, clutter or unnecessary equipment. Stretcher in lowest position, wheels locked, appropriate side rails in place. Provide visual cue, wrist band, yellow gown, etc. Monitor gait and stability. Monitor for mental status changes and reorient to person, place, and time. Review medications for side effects contributing to fall risk. Reinforce activity limits and safety measures with patient and family.

## 2019-12-07 LAB
ALBUMIN SERPL ELPH-MCNC: 3.9 G/DL — SIGNIFICANT CHANGE UP (ref 3.5–5.2)
ALP SERPL-CCNC: 113 U/L — SIGNIFICANT CHANGE UP (ref 30–115)
ALT FLD-CCNC: 19 U/L — SIGNIFICANT CHANGE UP (ref 0–41)
ANION GAP SERPL CALC-SCNC: 13 MMOL/L — SIGNIFICANT CHANGE UP (ref 7–14)
APPEARANCE UR: CLEAR — SIGNIFICANT CHANGE UP
AST SERPL-CCNC: 34 U/L — SIGNIFICANT CHANGE UP (ref 0–41)
BASOPHILS # BLD AUTO: 0.07 K/UL — SIGNIFICANT CHANGE UP (ref 0–0.2)
BASOPHILS NFR BLD AUTO: 0.7 % — SIGNIFICANT CHANGE UP (ref 0–1)
BILIRUB SERPL-MCNC: 1.2 MG/DL — SIGNIFICANT CHANGE UP (ref 0.2–1.2)
BILIRUB UR-MCNC: NEGATIVE — SIGNIFICANT CHANGE UP
BUN SERPL-MCNC: 14 MG/DL — SIGNIFICANT CHANGE UP (ref 10–20)
CALCIUM SERPL-MCNC: 9.9 MG/DL — SIGNIFICANT CHANGE UP (ref 8.5–10.1)
CHLORIDE SERPL-SCNC: 105 MMOL/L — SIGNIFICANT CHANGE UP (ref 98–110)
CO2 SERPL-SCNC: 24 MMOL/L — SIGNIFICANT CHANGE UP (ref 17–32)
COLOR SPEC: YELLOW — SIGNIFICANT CHANGE UP
CREAT SERPL-MCNC: 0.7 MG/DL — SIGNIFICANT CHANGE UP (ref 0.7–1.5)
DIFF PNL FLD: NEGATIVE — SIGNIFICANT CHANGE UP
EOSINOPHIL # BLD AUTO: 0.32 K/UL — SIGNIFICANT CHANGE UP (ref 0–0.7)
EOSINOPHIL NFR BLD AUTO: 3.1 % — SIGNIFICANT CHANGE UP (ref 0–8)
GLUCOSE BLDC GLUCOMTR-MCNC: 126 MG/DL — HIGH (ref 70–99)
GLUCOSE BLDC GLUCOMTR-MCNC: 93 MG/DL — SIGNIFICANT CHANGE UP (ref 70–99)
GLUCOSE BLDC GLUCOMTR-MCNC: 94 MG/DL — SIGNIFICANT CHANGE UP (ref 70–99)
GLUCOSE BLDC GLUCOMTR-MCNC: 95 MG/DL — SIGNIFICANT CHANGE UP (ref 70–99)
GLUCOSE SERPL-MCNC: 123 MG/DL — HIGH (ref 70–99)
GLUCOSE UR QL: NEGATIVE MG/DL — SIGNIFICANT CHANGE UP
HCT VFR BLD CALC: 36.8 % — LOW (ref 37–47)
HGB BLD-MCNC: 11.9 G/DL — LOW (ref 12–16)
IMM GRANULOCYTES NFR BLD AUTO: 0.3 % — SIGNIFICANT CHANGE UP (ref 0.1–0.3)
KETONES UR-MCNC: NEGATIVE — SIGNIFICANT CHANGE UP
LEUKOCYTE ESTERASE UR-ACNC: NEGATIVE — SIGNIFICANT CHANGE UP
LYMPHOCYTES # BLD AUTO: 2.7 K/UL — SIGNIFICANT CHANGE UP (ref 1.2–3.4)
LYMPHOCYTES # BLD AUTO: 26.1 % — SIGNIFICANT CHANGE UP (ref 20.5–51.1)
MCHC RBC-ENTMCNC: 27.5 PG — SIGNIFICANT CHANGE UP (ref 27–31)
MCHC RBC-ENTMCNC: 32.3 G/DL — SIGNIFICANT CHANGE UP (ref 32–37)
MCV RBC AUTO: 85.2 FL — SIGNIFICANT CHANGE UP (ref 81–99)
MONOCYTES # BLD AUTO: 0.8 K/UL — HIGH (ref 0.1–0.6)
MONOCYTES NFR BLD AUTO: 7.7 % — SIGNIFICANT CHANGE UP (ref 1.7–9.3)
NEUTROPHILS # BLD AUTO: 6.43 K/UL — SIGNIFICANT CHANGE UP (ref 1.4–6.5)
NEUTROPHILS NFR BLD AUTO: 62.1 % — SIGNIFICANT CHANGE UP (ref 42.2–75.2)
NITRITE UR-MCNC: NEGATIVE — SIGNIFICANT CHANGE UP
NRBC # BLD: 0 /100 WBCS — SIGNIFICANT CHANGE UP (ref 0–0)
PH UR: 5.5 — SIGNIFICANT CHANGE UP (ref 5–8)
PLATELET # BLD AUTO: 98 K/UL — LOW (ref 130–400)
POTASSIUM SERPL-MCNC: 4 MMOL/L — SIGNIFICANT CHANGE UP (ref 3.5–5)
POTASSIUM SERPL-SCNC: 4 MMOL/L — SIGNIFICANT CHANGE UP (ref 3.5–5)
PROT SERPL-MCNC: 6.5 G/DL — SIGNIFICANT CHANGE UP (ref 6–8)
PROT UR-MCNC: NEGATIVE MG/DL — SIGNIFICANT CHANGE UP
RBC # BLD: 4.32 M/UL — SIGNIFICANT CHANGE UP (ref 4.2–5.4)
RBC # FLD: 14.4 % — SIGNIFICANT CHANGE UP (ref 11.5–14.5)
SODIUM SERPL-SCNC: 142 MMOL/L — SIGNIFICANT CHANGE UP (ref 135–146)
SP GR SPEC: 1.02 — SIGNIFICANT CHANGE UP (ref 1.01–1.03)
UROBILINOGEN FLD QL: 0.2 MG/DL — SIGNIFICANT CHANGE UP (ref 0.2–0.2)
WBC # BLD: 10.35 K/UL — SIGNIFICANT CHANGE UP (ref 4.8–10.8)
WBC # FLD AUTO: 10.35 K/UL — SIGNIFICANT CHANGE UP (ref 4.8–10.8)

## 2019-12-07 PROCEDURE — 71046 X-RAY EXAM CHEST 2 VIEWS: CPT | Mod: 26

## 2019-12-07 PROCEDURE — 99223 1ST HOSP IP/OBS HIGH 75: CPT | Mod: AI

## 2019-12-07 PROCEDURE — 70450 CT HEAD/BRAIN W/O DYE: CPT | Mod: 26

## 2019-12-07 PROCEDURE — 71045 X-RAY EXAM CHEST 1 VIEW: CPT | Mod: 26

## 2019-12-07 RX ORDER — HYDROCHLOROTHIAZIDE 25 MG
12.5 TABLET ORAL DAILY
Refills: 0 | Status: DISCONTINUED | OUTPATIENT
Start: 2019-12-07 | End: 2019-12-23

## 2019-12-07 RX ORDER — RIVAROXABAN 15 MG-20MG
20 KIT ORAL
Refills: 0 | Status: DISCONTINUED | OUTPATIENT
Start: 2019-12-07 | End: 2019-12-18

## 2019-12-07 RX ORDER — LOPERAMIDE HCL 2 MG
2 TABLET ORAL
Refills: 0 | Status: DISCONTINUED | OUTPATIENT
Start: 2019-12-07 | End: 2019-12-08

## 2019-12-07 RX ORDER — CEFAZOLIN SODIUM 1 G
1000 VIAL (EA) INJECTION EVERY 8 HOURS
Refills: 0 | Status: DISCONTINUED | OUTPATIENT
Start: 2019-12-07 | End: 2019-12-20

## 2019-12-07 RX ORDER — PANTOPRAZOLE SODIUM 20 MG/1
40 TABLET, DELAYED RELEASE ORAL
Refills: 0 | Status: DISCONTINUED | OUTPATIENT
Start: 2019-12-07 | End: 2019-12-12

## 2019-12-07 RX ORDER — RIVAROXABAN 15 MG-20MG
1 KIT ORAL
Qty: 0 | Refills: 0 | DISCHARGE

## 2019-12-07 RX ORDER — CHLORHEXIDINE GLUCONATE 213 G/1000ML
1 SOLUTION TOPICAL
Refills: 0 | Status: DISCONTINUED | OUTPATIENT
Start: 2019-12-07 | End: 2019-12-23

## 2019-12-07 RX ORDER — OXYCODONE AND ACETAMINOPHEN 5; 325 MG/1; MG/1
1 TABLET ORAL EVERY 8 HOURS
Refills: 0 | Status: DISCONTINUED | OUTPATIENT
Start: 2019-12-07 | End: 2019-12-09

## 2019-12-07 RX ORDER — CEFAZOLIN SODIUM 1 G
VIAL (EA) INJECTION
Refills: 0 | Status: DISCONTINUED | OUTPATIENT
Start: 2019-12-07 | End: 2019-12-20

## 2019-12-07 RX ORDER — NIFEDIPINE 30 MG
30 TABLET, EXTENDED RELEASE 24 HR ORAL DAILY
Refills: 0 | Status: DISCONTINUED | OUTPATIENT
Start: 2019-12-07 | End: 2019-12-23

## 2019-12-07 RX ORDER — LOPERAMIDE HCL 2 MG
2 TABLET ORAL ONCE
Refills: 0 | Status: DISCONTINUED | OUTPATIENT
Start: 2019-12-07 | End: 2019-12-08

## 2019-12-07 RX ORDER — CEFAZOLIN SODIUM 1 G
1000 VIAL (EA) INJECTION ONCE
Refills: 0 | Status: COMPLETED | OUTPATIENT
Start: 2019-12-07 | End: 2019-12-07

## 2019-12-07 RX ADMIN — Medication 100 MILLIGRAM(S): at 23:41

## 2019-12-07 RX ADMIN — Medication 100 MILLIGRAM(S): at 15:40

## 2019-12-07 RX ADMIN — Medication 2 MILLIGRAM(S): at 17:29

## 2019-12-07 RX ADMIN — Medication 12.5 MILLIGRAM(S): at 06:40

## 2019-12-07 RX ADMIN — Medication 30 MILLIGRAM(S): at 06:40

## 2019-12-07 RX ADMIN — RIVAROXABAN 20 MILLIGRAM(S): KIT at 17:28

## 2019-12-07 RX ADMIN — PANTOPRAZOLE SODIUM 40 MILLIGRAM(S): 20 TABLET, DELAYED RELEASE ORAL at 06:40

## 2019-12-07 RX ADMIN — Medication 100 MILLIGRAM(S): at 09:28

## 2019-12-07 NOTE — H&P ADULT - ASSESSMENT
68 yo F with above PMHx presented with complaint of inability to care for herself and inability to ambulate s/p recent discharge from [facility].    #Deconditioning, Inability to care for self  -PT/OT evaluation  -Case management/ evaluation for safe disposition and consideration of long term placement?  -Fall risk  -Pain control  -Pending Urinalysis r/o UTI    #Normocytic Anemia, Thrombocytopenia, no evidence of bleeding  -F/U repeat CBC in AM    #HTN  -Continue Nifedipine     #AFIB, rate controlled  -Continue Xarelto    #GERD  -Continue protonix     Disposition: PT/Rehab evaluation 68 yo F with above PMHx presented with complaint of inability to care for herself and inability to ambulate s/p recent discharge from [facility] [place].    #Deconditioning, Inability to care for self  -PT/OT evaluation  -Case management/ evaluation for safe disposition and consideration of long term placement?  -Fall risk  -Pain control  -Pending Urinalysis r/o UTI    #Normocytic Anemia, Thrombocytopenia, no evidence of bleeding  -F/U repeat CBC in AM    #HTN  -Continue Nifedipine     #AFIB, rate controlled  -Continue Xarelto    #GERD  -Continue protonix     #Abdominal Wound  -Packing changes BID  -Patient has not followed up with surgery since September  -Awaiting surgical evaluation for wound care and site follow up    Disposition: PT/Rehab evaluation. 68 yo F with above PMHx presented with complaint of inability to care for herself and inability to ambulate s/p recent discharge from [facility].    #Deconditioning, Inability to care for self  -PT/OT evaluation  -Case management/ evaluation for safe disposition and consideration of long term placement?  -Fall risk  -Pain control  -Pending Urinalysis r/o UTI    #Normocytic Anemia, Thrombocytopenia, no evidence of bleeding  -F/U repeat CBC in AM    #HTN  -Continue Nifedipine     #AFIB, rate controlled  -Continue Xarelto    #GERD  -Continue protonix     #Abdominal Wound s/p recent excision of sinus tract/abscess   -Patient has not followed up with surgery since September  -Awaiting surgical evaluation for wound care and site follow up    Disposition: PT/Rehab evaluation.

## 2019-12-07 NOTE — CONSULT NOTE ADULT - ASSESSMENT
cellulitis  iv abx  pain mgmt  dvt/ gi prophylaxis  will follow abdominal wound/cellulitis  iv abx  pain mgmt  dvt/ gi prophylaxis  will follow

## 2019-12-07 NOTE — PHYSICAL THERAPY INITIAL EVALUATION ADULT - GENERAL OBSERVATIONS, REHAB EVAL
16:20-16:50. Chart reviewed; confirmed with RN to see the pt for PT. Pt ready for PT with complain of pain in abdomen and both knees. + hep lock L UE, + prima fit. Agreeable for PT evaluation.

## 2019-12-07 NOTE — CONSULT NOTE ADULT - ATTENDING COMMENTS
above noted abdomen soft open ulcers with with drainage pt well known to me will continue local care

## 2019-12-07 NOTE — PROGRESS NOTE ADULT - SUBJECTIVE AND OBJECTIVE BOX
S: Pt without any complaints; still with some drainage from abdominal wounds, which patient states has been improving since their debridement in September by Dr. Banda (she never followed up in office)  O: Vital Signs Last 24 Hrs  T(C): 36.2 (07 Dec 2019 06:07), Max: 36.4 (06 Dec 2019 22:21)  T(F): 97.2 (07 Dec 2019 06:07), Max: 97.5 (06 Dec 2019 22:21)  HR: 68 (07 Dec 2019 06:07) (66 - 68)  BP: 148/78 (07 Dec 2019 06:07) (142/71 - 148/78)  BP(mean): --  RR: 20 (07 Dec 2019 06:07) (20 - 20)  SpO2: 97% (07 Dec 2019 04:10) (97% - 98%)    MEDICATIONS  (STANDING):  ceFAZolin   IVPB 1000 milliGRAM(s) IV Intermittent every 8 hours  ceFAZolin   IVPB      chlorhexidine 4% Liquid 1 Application(s) Topical <User Schedule>  hydrochlorothiazide 12.5 milliGRAM(s) Oral daily  NIFEdipine XL 30 milliGRAM(s) Oral daily  pantoprazole    Tablet 40 milliGRAM(s) Oral before breakfast  rivaroxaban 20 milliGRAM(s) Oral with dinner    MEDICATIONS  (PRN):  oxycodone    5 mG/acetaminophen 325 mG 1 Tablet(s) Oral every 8 hours PRN Severe Pain (7 - 10)      EXAM:  abd: 2 small open wounds on right side of abdomen with surrounding mild erythema; nontender; no active bleeding or purulent drainage; wounds repacked by surgical team

## 2019-12-07 NOTE — PHYSICAL THERAPY INITIAL EVALUATION ADULT - ADDITIONAL COMMENTS
Pt has a RW and straight cane at home; ambulates with RW independently short distance. Pt has difficulty with stair negotiation. Pt came home from [facility] on Tuesday and was admitted to the hospital on Friday.

## 2019-12-07 NOTE — ED PROVIDER NOTE - CLINICAL SUMMARY MEDICAL DECISION MAKING FREE TEXT BOX
Patient presents for evaluation of generalized weakness over the past week that is progressive the pateint cannot ambulate up her steps at home in addition not able to make it to the bathroom I have discussed case with surgery who will consult on case advised no imaging or abx at this time I obtained ct head labs and ekg I will admit for further management at this time

## 2019-12-07 NOTE — ED PROVIDER NOTE - CARE PLAN
Principal Discharge DX:	Weakness generalized Principal Discharge DX:	Weakness generalized  Secondary Diagnosis:	Unsteady gait

## 2019-12-07 NOTE — ED PROVIDER NOTE - SKIN COLOR
patient has serosang draniage from ruq surgical wound with no associated foul smelling odor no large surrounding cellulitis

## 2019-12-07 NOTE — ED PROVIDER NOTE - OBJECTIVE STATEMENT
patient is a 69 presents with weakness that is generalized worse over the past 8 days.   she denies any fevers or chills she denies any vomiting she denies any diarrhea she is s/p infected hernia mesh removal by dr means in later september and has been in [facility] rehab discharged 8 days prior. she has been declining since. she is unable to ambulate at home and is unable to meed adls's at this time

## 2019-12-07 NOTE — H&P ADULT - NSHPPHYSICALEXAM_GEN_ALL_CORE
CONSTITUTIONAL: NAD, obese, nontoxic appearing   ENMT: EOMI, PERRLA, No tonsillar erythema, exudates, or enlargement, neck supple, No JVD  PSYCH: Alert & Oriented X3  RESPIRATORY: Clear to percussion bilaterally; No rales, rhonchi, wheezing, or rubs  CARDIOVASCULAR: Regular rate and rhythm; No murmurs, rubs, or gallops, negative edema  GASTROINTESTINAL: Soft, Nontender, Nondistended; Bowel sounds present  EXTREMITIES:  2+ Peripheral Pulses, No clubbing, cyanosis  SKIN: No rashes or lesions CONSTITUTIONAL: NAD, obese, nontoxic appearing   ENMT: EOMI, PERRLA, No tonsillar erythema, exudates, or enlargement, neck supple, No JVD, poor dentition  PSYCH: Alert & Oriented X3  RESPIRATORY: Clear to percussion bilaterally; No rales, rhonchi, wheezing, or rubs  CARDIOVASCULAR: Regular rate and rhythm; No murmurs, rubs, or gallops, negative edema  GASTROINTESTINAL: Soft, Nontender, Nondistended; Bowel sounds present, dressing over open wound patient refuses take down of dressing for examination stating dressing and packing recently changed, dressing clean, dry and intact  EXTREMITIES:  2+ Peripheral Pulses, No clubbing, cyanosis  SKIN: AS above

## 2019-12-07 NOTE — H&P ADULT - HISTORY OF PRESENT ILLNESS
67 YO F with a PMH of hernia repair who presents to the hospital for eval of right-sided flank pain for the past x 5 years. Progressively worsening. Associated with purulent discharge and a redness and swelling over the flank. Of note, the pt states that she had a hernia repair in the same area and that she never followed up with her surgeon because she thought they were making things worse. Denies any fevers, chills, cough, CP, SOB, dysuria, or constipation.     In the ED, the pt had a CT-AP performed which showed a possible enterocutaneous fistula     patient admitted to medical floor, seen by Surgical team and s/p Excision of sinus tract of abdominal wall sinus tract x 4, anterior abdominal wall; wound packing instructions given to nursing home. Pt was initially on IV antibiotics (IV zosyn and IV vanco); switched to oral keflex as per ID Attending's recommendations.  -pt did well during hospital course; care discussed with son in length and aware of the plan of care and outpatient follow ups   -pt seen and examined on the day of discharge  -d/c papers done by me 70 yo F with PMHX of HTN, DM II, Obesity, AFIB on Xarelto,Hernia s/p Repair, Enterocutaneous Fistula /Abdominal Wall Abscess s/p Excision of Sinus Tract (September 2019) presents from [facility] with complaint of generalized weakness and inability to care for herself. 70 yo F with PMHX of HTN, DM II, Obesity, AFIB on Xarelto,Hernia s/p Repair, Enterocutaneous Fistula /Abdominal Wall Abscess s/p Excision of Sinus Tract (September 2019) presents from home, recently discharged from [facility] [place] this past Tuesday, with complaint of bilateral lower extremity weakness and inability to care for herself. Patient currently lives with her son as her own house has too many flights of stairs, states that her son is unable to care for her as well, desires to return to [facility]. Patient denies fever, chills, nausea, vomiting, sick contacts, recent travel, abdominal pain.

## 2019-12-07 NOTE — PROGRESS NOTE ADULT - ASSESSMENT
68 yo F with above PMHx presented with complaint of inability to care for herself and inability to ambulate s/p recent discharge from [facility].  She is s/p excision of abdominal wall sinus tracts (x4) on 9/30/19 by Dr. Banda, still with healing wounds      #Abdominal Wound s/p recent excision of sinus tract/abscess   -continue daily wound packing   - to be further evaluated by attending

## 2019-12-07 NOTE — H&P ADULT - NSICDXPASTMEDICALHX_GEN_ALL_CORE_FT
PAST MEDICAL HISTORY:  Diabetes mellitus     Gastroesophageal reflux disease     HTN (hypertension)     Obesity

## 2019-12-07 NOTE — ED PROVIDER NOTE - ATTENDING CONTRIBUTION TO CARE
I was present for and supervised the key and critical aspects of the procedures performed during the care of the patient. patient is a 69 presents with weakness that is generalized worse over the past 8 days.   she denies any fevers or chills she denies any vomiting she denies any diarrhea she is s/p infected hernia mesh removal by dr means in later september and has been in [facility] rehab discharged 8 days prior. she has been declining since. she is unable to ambulate at home and is unable to meed adls's at this time  On physical exam the patient is nc/at perrla eomi oropharynx clear cta b/l, rrr s12 ntoed radial pulses 2 += pedal pulses 2+=, patient has edema, ruq abd reveals packing placed with no signs of ascending cellulitis   A/P- we obtained labs, given iv fluids, I have discussed the case with surgery I will admit for further management at this tiem

## 2019-12-07 NOTE — PHYSICAL THERAPY INITIAL EVALUATION ADULT - PHYSICAL ASSIST/NONPHYSICAL ASSIST: GAIT, REHAB EVAL
verbal cues/proper sequencing with rolling walker, upright posture, heel-toe pattern/1 person assist

## 2019-12-07 NOTE — PHYSICAL THERAPY INITIAL EVALUATION ADULT - IMPAIRMENTS CONTRIBUTING TO GAIT DEVIATIONS, PT EVAL
impaired balance/decreased strength/decreased endurance/impaired postural control/decreased ROM/pain

## 2019-12-07 NOTE — H&P ADULT - NSHPLABSRESULTS_GEN_ALL_CORE
11.9   10.35 )-----------( 98       ( 07 Dec 2019 00:55 )             36.8       12-07    142  |  105  |  14  ----------------------------<  123<H>  4.0   |  24  |  0.7    Ca    9.9      07 Dec 2019 00:55    TPro  6.5  /  Alb  3.9  /  TBili  1.2  /  DBili  x   /  AST  34  /  ALT  19  /  AlkPhos  113  12-07

## 2019-12-07 NOTE — CONSULT NOTE ADULT - SUBJECTIVE AND OBJECTIVE BOX
· HPI Objective Statement: patient is a 69 presents with weakness that is generalized worse over the past 8 days.   she denies any fevers or chills she denies any vomiting she denies any diarrhea she is s/p infected hernia mesh removal by dr Banda in September and has been in [facility], discharged 8 days prior. she has been declining since. she is unable to ambulate at home and is unable to manage adls's at this time	    PAST MEDICAL/SURGICAL/FAMILY/SOCIAL HISTORY:    Past Medical History:  Diabetes mellitus    Gastroesophageal reflux disease    HTN (hypertension)    Obesity.     Past Surgical History:  H/O hernia repair  2011 and revised in 2013  History of cholecystectomy.     Tobacco Usage:  · Tobacco Usage	Never smoker	    ALLERGIES AND HOME MEDICATIONS:   Allergies:        Allergies:  	No Known Allergies:     Home Medications:   * Incomplete Medication History as of 06-Dec-2019 22:29 documented in Structured Notes  · 	pantoprazole 40 mg oral delayed release tablet: Last Dose Taken:  , 1 tab(s) orally once a day (before a meal)  · 	NIFEdipine 30 mg oral tablet, extended release: Last Dose Taken:  , 1 tab(s) orally once a day  · 	rivaroxaban 20 mg oral tablet: Last Dose Taken:  , 1 tab(s) orally once a day (before a meal)  · 	oxycodone-acetaminophen 5 mg-325 mg oral tablet: Last Dose Taken:  , 1 tab(s) orally every 8 hours, As Needed - 6) for severe pain   · 	hydroCHLOROthiazide 12.5 mg oral capsule: Last Dose Taken:  , 1 cap(s) orally once a day  · 	Xarelto 20 mg oral tablet: 1 tab(s) orally once a day (in the evening)    REVIEW OF SYSTEMS:    Review of Systems:  · CONSTITUTIONAL: no fever and no chills.	  · EYES: no discharge, no irritation, no pain, no redness, and no visual changes.	  · ENMT: Ears: no ear pain and no hearing problems.Nose: no nasal congestion and no nasal drainage.Mouth/Throat: no dysphagia, no hoarseness and no throat pain.Neck: no lumps, no pain, no stiffness and no swollen glands.	  · CARDIOVASCULAR: no chest pain and no edema.	  · RESPIRATORY: no chest pain, no cough, and no shortness of breath.	  · GASTROINTESTINAL: no abdominal pain, no bloating, no constipation, no diarrhea, no nausea and no vomiting.	  · GENITOURINARY: no dysuria, no frequency, and no hematuria.	  · MUSCULOSKELETAL: no back pain, no gout, no musculoskeletal pain, no neck pain, and no weakness.	  · SKIN:serosanguinous drainage  from ruq  draining  wound, no jaundice, no lesions, no pruritis, and no rashes.	  · NEUROLOGICAL: - - -	  · Neurological [+]: DIFFICULTY WALKING / IMBALANCE, weakness	    VITAL SIGNS( Pullset):    ,,ED ADULT Flow Sheet:    06-Dec-2019 22:21	  · Temp (F): 97.5	  · Temp (C) Temp (C): 36.4	  · Heart Rate Heart Rate (beats/min): 67	  · BP Systolic Systolic: 145	  · BP Diastolic Diastolic (mm Hg): 75	  · Respiration Rate (breaths/min) Respiration Rate (breaths/min): 20	  · SpO2 (%) SpO2 (%): 98	  · Dosing Weight (KILOGRAMS) Dosing Weight (KILOGRAMS): 108	  · Dosing Weight  (POUNDS) Dosing Weight (POUNDS): 238	  · Height (FEET) Height (FEET): 5	  · Height (INCHES) Height (INCHES): 6	  · Height (CENTIMETERS) Height (CENTIMETERS): 167.64	  · BSA (m2): 2.15	  · BMI (kG/m2) BMI (kG/m2): 38.4	  · Ideal Body Weight(kg) Ideal Body Weight(kg): 59	  · SpO2 (%) SpO2 (%): 98	    22:26	  · Pain Rating (0-10): Rest: 3	  · Pain Rating (0-10): Activity: 3	  · Preferred Language to Address Healthcare Preferred Language to Address Healthcare: English	  · Extensions of Self Extensions of Self: None	    23:23	  · 12 Lead ECG : completed; Dr. Flor	    PHYSICAL EXAM:   · CONSTITUTIONAL: - - -	  · Appearance: well appearing	  · Distress: no apparent	  · EYES: Clear bilaterally, pupils equal, round and reactive to light.	  · CARDIAC: Normal rate, regular rhythm.  Heart sounds S1, S2.  No murmurs, rubs or gallops.	  · RESPIRATORY: Breath sounds clear and equal bilaterally.	  · GASTROINTESTINAL: Abdomen soft, non-tender, no guarding.	  · MUSCULOSKELETAL: Spine appears normal, range of motion is not limited, no muscle or joint tenderness	  · NEUROLOGICAL: Alert and oriented, no focal deficits, no motor or sensory deficits.	  · SKIN: WOUNDS	  · SKIN COLOR: patient has serosanguinous  draniage from ruq surgical wound with no associated foul smelling odor no large surrounding cellulitis	    RESULTS:   Hematology:	    07-Dec-2019 00:55, Complete Blood Count + Automated Diff	  · WBC Count	10.35	[4.80 - 10.80 K/uL]	  · RBC Count	4.32	[4.20 - 5.40 M/uL]	  · Hemoglobin	   11.9	[12.0 - 16.0 g/dL]	  · Hematocrit	   36.8	[37.0 - 47.0 %]	  · Mean Cell Volume	85.2	[81.0 - 99.0 fL]	  · Mean Cell Hemoglobin	27.5	[27.0 - 31.0 pg]	  · Mean Cell Hemoglobin Conc	32.3	[32.0 - 37.0 g/dL]	  · Red Cell Distrib Width	14.4	[11.5 - 14.5 %]	  · Platelet Count - Automated	   98	[130 - 400 K/uL]	  · Auto Neutrophil #	6.43	[1.40 - 6.50 K/uL]	  · Auto Lymphocyte #	2.70	[1.20 - 3.40 K/uL]	  · Auto Monocyte #	   0.80	[0.10 - 0.60 K/uL]	  · Auto Eosinophil #	0.32	[0.00 - 0.70 K/uL]	  · Auto Basophil #	0.07	[0.00 - 0.20 K/uL]	  · Auto Neutrophil %	62.1	[42.2 - 75.2 %]	  Differential percentages must be correlated with absolute numbers for  clinical significance.	  · Auto Lymphocyte %	26.1	[20.5 - 51.1 %]	  · Auto Monocyte %	7.7	[1.7 - 9.3 %]	  · Auto Eosinophil %	3.1	[0.0 - 8.0 %]	  · Auto Basophil %	0.7	[0.0 - 1.0 %]	  · Auto Immature Granulocyte %	0.3	[0.1 - 0.3 %]	  · Nucleated RBC	0	[0 - 0 /100 WBCs]	    · EKG Date/Time: 07-Dec-2019 01:57	  · Rate: 62	  · Interpretation: abnormal	  · Abnormal Rhythm: atrial fibrillation	  · Acute or Evolving MI?: no	  · Axis: Normal	  · QRS: 100 · HPI Objective Statement: patient is a 69 presents with weakness that is generalized worse over the past 8 days.   she denies any fevers or chills she denies any vomiting she denies any diarrhea she is s/p infected hernia mesh removal, s/p recent excision of sinus tract/abscess  by dr Banda in September and has been in [facility], discharged 8 days prior. she has been declining since. she is unable to ambulate at home and is unable to manage adls's at this time	    PAST MEDICAL/SURGICAL/FAMILY/SOCIAL HISTORY:    Past Medical History:  Diabetes mellitus    Gastroesophageal reflux disease    HTN (hypertension)    Obesity.     Past Surgical History:  H/O hernia repair  2011 and revised in 2013  History of cholecystectomy.     Tobacco Usage:  · Tobacco Usage	Never smoker	    ALLERGIES AND HOME MEDICATIONS:   Allergies:        Allergies:  	No Known Allergies:     Home Medications:   * Incomplete Medication History as of 06-Dec-2019 22:29 documented in Structured Notes  · 	pantoprazole 40 mg oral delayed release tablet: Last Dose Taken:  , 1 tab(s) orally once a day (before a meal)  · 	NIFEdipine 30 mg oral tablet, extended release: Last Dose Taken:  , 1 tab(s) orally once a day  · 	rivaroxaban 20 mg oral tablet: Last Dose Taken:  , 1 tab(s) orally once a day (before a meal)  · 	oxycodone-acetaminophen 5 mg-325 mg oral tablet: Last Dose Taken:  , 1 tab(s) orally every 8 hours, As Needed - 6) for severe pain   · 	hydroCHLOROthiazide 12.5 mg oral capsule: Last Dose Taken:  , 1 cap(s) orally once a day  · 	Xarelto 20 mg oral tablet: 1 tab(s) orally once a day (in the evening)    REVIEW OF SYSTEMS:    Review of Systems:  · CONSTITUTIONAL: no fever and no chills.	  · EYES: no discharge, no irritation, no pain, no redness, and no visual changes.	  · ENMT: Ears: no ear pain and no hearing problems.Nose: no nasal congestion and no nasal drainage.Mouth/Throat: no dysphagia, no hoarseness and no throat pain.Neck: no lumps, no pain, no stiffness and no swollen glands.	  · CARDIOVASCULAR: no chest pain and no edema.	  · RESPIRATORY: no chest pain, no cough, and no shortness of breath.	  · GASTROINTESTINAL: no abdominal pain, no bloating, no constipation, no diarrhea, no nausea and no vomiting.	  · GENITOURINARY: no dysuria, no frequency, and no hematuria.	  · MUSCULOSKELETAL: no back pain, no gout, no musculoskeletal pain, no neck pain, and no weakness.	  · SKIN:serosanguinous drainage  from ruq  draining  wound, no jaundice, no lesions, no pruritis, and no rashes.	  · NEUROLOGICAL: - - -	  · Neurological [+]: DIFFICULTY WALKING / IMBALANCE, weakness	    VITAL SIGNS( Pullset):    ,,ED ADULT Flow Sheet:    06-Dec-2019 22:21	  · Temp (F): 97.5	  · Temp (C) Temp (C): 36.4	  · Heart Rate Heart Rate (beats/min): 67	  · BP Systolic Systolic: 145	  · BP Diastolic Diastolic (mm Hg): 75	  · Respiration Rate (breaths/min) Respiration Rate (breaths/min): 20	  · SpO2 (%) SpO2 (%): 98	  · Dosing Weight (KILOGRAMS) Dosing Weight (KILOGRAMS): 108	  · Dosing Weight  (POUNDS) Dosing Weight (POUNDS): 238	  · Height (FEET) Height (FEET): 5	  · Height (INCHES) Height (INCHES): 6	  · Height (CENTIMETERS) Height (CENTIMETERS): 167.64	  · BSA (m2): 2.15	  · BMI (kG/m2) BMI (kG/m2): 38.4	  · Ideal Body Weight(kg) Ideal Body Weight(kg): 59	  · SpO2 (%) SpO2 (%): 98	    22:26	  · Pain Rating (0-10): Rest: 3	  · Pain Rating (0-10): Activity: 3	  · Preferred Language to Address Healthcare Preferred Language to Address Healthcare: English	  · Extensions of Self Extensions of Self: None	    23:23	  · 12 Lead ECG : completed; Dr. Flor	    PHYSICAL EXAM:   · CONSTITUTIONAL: - - -	  · Appearance: well appearing	  · Distress: no apparent	  · EYES: Clear bilaterally, pupils equal, round and reactive to light.	  · CARDIAC: Normal rate, regular rhythm.  Heart sounds S1, S2.  No murmurs, rubs or gallops.	  · RESPIRATORY: Breath sounds clear and equal bilaterally.	  · GASTROINTESTINAL: Abdomen soft, non-tender, no guarding.	  · MUSCULOSKELETAL: Spine appears normal, range of motion is not limited, no muscle or joint tenderness	  · NEUROLOGICAL: Alert and oriented, no focal deficits, no motor or sensory deficits.	  · SKIN: WOUNDS	  · SKIN COLOR: patient has serosanguinous  draniage from ruq surgical wound with no associated foul smelling odor no large surrounding cellulitis	    RESULTS:   Hematology:	    07-Dec-2019 00:55, Complete Blood Count + Automated Diff	  · WBC Count	10.35	[4.80 - 10.80 K/uL]	  · RBC Count	4.32	[4.20 - 5.40 M/uL]	  · Hemoglobin	   11.9	[12.0 - 16.0 g/dL]	  · Hematocrit	   36.8	[37.0 - 47.0 %]	  · Mean Cell Volume	85.2	[81.0 - 99.0 fL]	  · Mean Cell Hemoglobin	27.5	[27.0 - 31.0 pg]	  · Mean Cell Hemoglobin Conc	32.3	[32.0 - 37.0 g/dL]	  · Red Cell Distrib Width	14.4	[11.5 - 14.5 %]	  · Platelet Count - Automated	   98	[130 - 400 K/uL]	  · Auto Neutrophil #	6.43	[1.40 - 6.50 K/uL]	  · Auto Lymphocyte #	2.70	[1.20 - 3.40 K/uL]	  · Auto Monocyte #	   0.80	[0.10 - 0.60 K/uL]	  · Auto Eosinophil #	0.32	[0.00 - 0.70 K/uL]	  · Auto Basophil #	0.07	[0.00 - 0.20 K/uL]	  · Auto Neutrophil %	62.1	[42.2 - 75.2 %]	  Differential percentages must be correlated with absolute numbers for  clinical significance.	  · Auto Lymphocyte %	26.1	[20.5 - 51.1 %]	  · Auto Monocyte %	7.7	[1.7 - 9.3 %]	  · Auto Eosinophil %	3.1	[0.0 - 8.0 %]	  · Auto Basophil %	0.7	[0.0 - 1.0 %]	  · Auto Immature Granulocyte %	0.3	[0.1 - 0.3 %]	  · Nucleated RBC	0	[0 - 0 /100 WBCs]	    · EKG Date/Time: 07-Dec-2019 01:57	  · Rate: 62	  · Interpretation: abnormal	  · Abnormal Rhythm: atrial fibrillation	  · Acute or Evolving MI?: no	  · Axis: Normal	  · QRS: 100

## 2019-12-07 NOTE — ED PROVIDER NOTE - PROGRESS NOTE DETAILS
discussed case with surgery who notes no further surgery, antibiotics or imaging indicated at this time advised admit to medicine and dr chowdhury will consult

## 2019-12-07 NOTE — PHYSICAL THERAPY INITIAL EVALUATION ADULT - GAIT DEVIATIONS NOTED, PT EVAL
decreased step length/decreased skyla/decreased weight-shifting ability/forward flexed posture/increased time in double stance

## 2019-12-08 LAB
ANION GAP SERPL CALC-SCNC: 13 MMOL/L — SIGNIFICANT CHANGE UP (ref 7–14)
BASOPHILS # BLD AUTO: 0.06 K/UL — SIGNIFICANT CHANGE UP (ref 0–0.2)
BASOPHILS NFR BLD AUTO: 0.7 % — SIGNIFICANT CHANGE UP (ref 0–1)
BUN SERPL-MCNC: 13 MG/DL — SIGNIFICANT CHANGE UP (ref 10–20)
C DIFF BY PCR RESULT: POSITIVE
C DIFF TOX GENS STL QL NAA+PROBE: SIGNIFICANT CHANGE UP
CALCIUM SERPL-MCNC: 10.3 MG/DL — HIGH (ref 8.5–10.1)
CHLORIDE SERPL-SCNC: 103 MMOL/L — SIGNIFICANT CHANGE UP (ref 98–110)
CO2 SERPL-SCNC: 26 MMOL/L — SIGNIFICANT CHANGE UP (ref 17–32)
CREAT SERPL-MCNC: 0.7 MG/DL — SIGNIFICANT CHANGE UP (ref 0.7–1.5)
EOSINOPHIL # BLD AUTO: 0.33 K/UL — SIGNIFICANT CHANGE UP (ref 0–0.7)
EOSINOPHIL NFR BLD AUTO: 3.7 % — SIGNIFICANT CHANGE UP (ref 0–8)
GLUCOSE BLDC GLUCOMTR-MCNC: 101 MG/DL — HIGH (ref 70–99)
GLUCOSE BLDC GLUCOMTR-MCNC: 108 MG/DL — HIGH (ref 70–99)
GLUCOSE BLDC GLUCOMTR-MCNC: 134 MG/DL — HIGH (ref 70–99)
GLUCOSE SERPL-MCNC: 94 MG/DL — SIGNIFICANT CHANGE UP (ref 70–99)
HCT VFR BLD CALC: 36.8 % — LOW (ref 37–47)
HGB BLD-MCNC: 11.9 G/DL — LOW (ref 12–16)
IMM GRANULOCYTES NFR BLD AUTO: 0.4 % — HIGH (ref 0.1–0.3)
LYMPHOCYTES # BLD AUTO: 2.16 K/UL — SIGNIFICANT CHANGE UP (ref 1.2–3.4)
LYMPHOCYTES # BLD AUTO: 24.1 % — SIGNIFICANT CHANGE UP (ref 20.5–51.1)
MAGNESIUM SERPL-MCNC: 1.4 MG/DL — LOW (ref 1.8–2.4)
MCHC RBC-ENTMCNC: 27.7 PG — SIGNIFICANT CHANGE UP (ref 27–31)
MCHC RBC-ENTMCNC: 32.3 G/DL — SIGNIFICANT CHANGE UP (ref 32–37)
MCV RBC AUTO: 85.8 FL — SIGNIFICANT CHANGE UP (ref 81–99)
MONOCYTES # BLD AUTO: 0.76 K/UL — HIGH (ref 0.1–0.6)
MONOCYTES NFR BLD AUTO: 8.5 % — SIGNIFICANT CHANGE UP (ref 1.7–9.3)
NEUTROPHILS # BLD AUTO: 5.63 K/UL — SIGNIFICANT CHANGE UP (ref 1.4–6.5)
NEUTROPHILS NFR BLD AUTO: 62.6 % — SIGNIFICANT CHANGE UP (ref 42.2–75.2)
NRBC # BLD: 0 /100 WBCS — SIGNIFICANT CHANGE UP (ref 0–0)
PHOSPHATE SERPL-MCNC: 2.9 MG/DL — SIGNIFICANT CHANGE UP (ref 2.1–4.9)
PLATELET # BLD AUTO: 186 K/UL — SIGNIFICANT CHANGE UP (ref 130–400)
POTASSIUM SERPL-MCNC: 3.2 MMOL/L — LOW (ref 3.5–5)
POTASSIUM SERPL-SCNC: 3.2 MMOL/L — LOW (ref 3.5–5)
RBC # BLD: 4.29 M/UL — SIGNIFICANT CHANGE UP (ref 4.2–5.4)
RBC # FLD: 14.2 % — SIGNIFICANT CHANGE UP (ref 11.5–14.5)
SODIUM SERPL-SCNC: 142 MMOL/L — SIGNIFICANT CHANGE UP (ref 135–146)
WBC # BLD: 8.98 K/UL — SIGNIFICANT CHANGE UP (ref 4.8–10.8)
WBC # FLD AUTO: 8.98 K/UL — SIGNIFICANT CHANGE UP (ref 4.8–10.8)

## 2019-12-08 PROCEDURE — 99233 SBSQ HOSP IP/OBS HIGH 50: CPT

## 2019-12-08 RX ORDER — MAGNESIUM SULFATE 500 MG/ML
2 VIAL (ML) INJECTION ONCE
Refills: 0 | Status: COMPLETED | OUTPATIENT
Start: 2019-12-08 | End: 2019-12-08

## 2019-12-08 RX ORDER — POTASSIUM CHLORIDE 20 MEQ
40 PACKET (EA) ORAL EVERY 4 HOURS
Refills: 0 | Status: COMPLETED | OUTPATIENT
Start: 2019-12-08 | End: 2019-12-08

## 2019-12-08 RX ORDER — POTASSIUM CHLORIDE 20 MEQ
20 PACKET (EA) ORAL
Refills: 0 | Status: DISCONTINUED | OUTPATIENT
Start: 2019-12-08 | End: 2019-12-08

## 2019-12-08 RX ORDER — VANCOMYCIN HCL 1 G
125 VIAL (EA) INTRAVENOUS EVERY 6 HOURS
Refills: 0 | Status: DISCONTINUED | OUTPATIENT
Start: 2019-12-08 | End: 2019-12-13

## 2019-12-08 RX ADMIN — Medication 12.5 MILLIGRAM(S): at 08:36

## 2019-12-08 RX ADMIN — PANTOPRAZOLE SODIUM 40 MILLIGRAM(S): 20 TABLET, DELAYED RELEASE ORAL at 08:36

## 2019-12-08 RX ADMIN — Medication 125 MILLIGRAM(S): at 23:11

## 2019-12-08 RX ADMIN — Medication 50 GRAM(S): at 19:12

## 2019-12-08 RX ADMIN — Medication 125 MILLIGRAM(S): at 17:26

## 2019-12-08 RX ADMIN — Medication 2 MILLIGRAM(S): at 08:37

## 2019-12-08 RX ADMIN — Medication 100 MILLIGRAM(S): at 06:26

## 2019-12-08 RX ADMIN — RIVAROXABAN 20 MILLIGRAM(S): KIT at 17:11

## 2019-12-08 RX ADMIN — Medication 100 MILLIGRAM(S): at 17:11

## 2019-12-08 RX ADMIN — Medication 40 MILLIEQUIVALENT(S): at 17:12

## 2019-12-08 RX ADMIN — Medication 125 MILLIGRAM(S): at 11:44

## 2019-12-08 RX ADMIN — Medication 100 MILLIGRAM(S): at 22:00

## 2019-12-08 RX ADMIN — Medication 30 MILLIGRAM(S): at 08:36

## 2019-12-08 RX ADMIN — Medication 40 MILLIEQUIVALENT(S): at 23:10

## 2019-12-08 NOTE — PROGRESS NOTE ADULT - ASSESSMENT
70 yo F with above PMHx presented with complaint of inability to care for herself and inability to ambulate s/p recent discharge from [facility].    1. Deconditioning, likely due to ongoing C.Diff infection: C diff positive this admission, will start vancomycin and place contact isolation. PT/Rehab eval. DC planning once diarrhea resolves  2. Normocytic anemia: Monitor  3. HTN: Continue nifedipine  4. Chronic Afib: Continue xarelto, rate controlled off meds  5. GERD: Continue protonix  6. Abdominal Wound likely cellulitis s/p recent excision of sinus tract/abscess: Surgery following for daily wound dressing/packing; Continue IV cefazolin for now.    GI/DVT PPX    #Progress Note Handoff  Pending (specify): Resolution of diarrhea  Family discussion: d/w pt regarding c.diff infection  Disposition: NH

## 2019-12-08 NOTE — PROGRESS NOTE ADULT - SUBJECTIVE AND OBJECTIVE BOX
DARCIEPATRICIA  69y, Female  Allergy: No Known Allergies    Hospital Day: 1d    Patient seen and examined earlier today. Has been having diarrhea which patient states is ongoing for several months.    PMH/PSH:  PAST MEDICAL & SURGICAL HISTORY:  Gastroesophageal reflux disease  Obesity  Diabetes mellitus  HTN (hypertension)  History of cholecystectomy  H/O hernia repair:  and revised in     VITALS:  T(F): 98.4 (19 @ 08:34), Max: 98.4 (19 @ 08:34)  HR: 60 (19 @ 08:34)  BP: 140/66 (19 @ 08:34) (114/65 - 149/95)  RR: 16 (19 @ 08:34)  SpO2: --    TESTS & MEASUREMENTS:  Weight (Kg): 101.4 (19 @ 06:07)  BMI (kg/m2): 36.1 ()    19 @ 07:01  -  19 @ 07:00  --------------------------------------------------------  IN: 0 mL / OUT: 600 mL / NET: -600 mL                        11.9   8.98  )-----------( 186      ( 08 Dec 2019 04:30 )             36.8         142  |  103  |  13  ----------------------------<  94  3.2<L>   |  26  |  0.7    Ca    10.3<H>      08 Dec 2019 04:30  Phos  2.9       Mg     1.4         TPro  6.5  /  Alb  3.9  /  TBili  1.2  /  DBili  x   /  AST  34  /  ALT  19  /  AlkPhos  113      LIVER FUNCTIONS - ( 07 Dec 2019 00:55 )  Alb: 3.9 g/dL / Pro: 6.5 g/dL / ALK PHOS: 113 U/L / ALT: 19 U/L / AST: 34 U/L / GGT: x           Urinalysis Basic - ( 07 Dec 2019 04:05 )    Color: Yellow / Appearance: Clear / S.025 / pH: x  Gluc: x / Ketone: Negative  / Bili: Negative / Urobili: 0.2 mg/dL   Blood: x / Protein: Negative mg/dL / Nitrite: Negative   Leuk Esterase: Negative / RBC: x / WBC x   Sq Epi: x / Non Sq Epi: x / Bacteria: x    MEDICATIONS:  MEDICATIONS  (STANDING):  ceFAZolin   IVPB 1000 milliGRAM(s) IV Intermittent every 8 hours  ceFAZolin   IVPB      chlorhexidine 4% Liquid 1 Application(s) Topical <User Schedule>  hydrochlorothiazide 12.5 milliGRAM(s) Oral daily  NIFEdipine XL 30 milliGRAM(s) Oral daily  pantoprazole    Tablet 40 milliGRAM(s) Oral before breakfast  rivaroxaban 20 milliGRAM(s) Oral with dinner  vancomycin    Solution 125 milliGRAM(s) Oral every 6 hours    MEDICATIONS  (PRN):  oxycodone    5 mG/acetaminophen 325 mG 1 Tablet(s) Oral every 8 hours PRN Severe Pain (7 - 10)    HOME MEDICATIONS:  hydroCHLOROthiazide 12.5 mg oral capsule ()  NIFEdipine 30 mg oral tablet, extended release ()  oxycodone-acetaminophen 5 mg-325 mg oral tablet ()  pantoprazole 40 mg oral delayed release tablet ()  rivaroxaban 20 mg oral tablet ()    REVIEW OF SYSTEMS:  All other review of systems is negative unless indicated above.     PHYSICAL EXAM:  GENERAL: NAD  HEENT: No Swelling  CHEST/LUNG: Good air entry, No wheezing  HEART: RRR, No murmurs  ABDOMEN: Soft, Bowel sounds present; Abdominal wound packed and dressed  EXTREMITIES:  No clubbing

## 2019-12-08 NOTE — PROGRESS NOTE ADULT - ASSESSMENT
70 yo F with above PMHx presented with complaint of inability to care for herself and inability to ambulate s/p recent discharge from [facility].  She is s/p excision of abdominal wall sinus tracts (x4) on 9/30/19 by Dr. Banda, still with healing wounds      #Abdominal Wound s/p recent excision of sinus tract/abscess   -continue daily wound packing  - to be further evaluated by attending    # c-dif positive   - on PO vanco

## 2019-12-09 LAB
ALBUMIN SERPL ELPH-MCNC: 3.6 G/DL — SIGNIFICANT CHANGE UP (ref 3.5–5.2)
ALP SERPL-CCNC: 105 U/L — SIGNIFICANT CHANGE UP (ref 30–115)
ALT FLD-CCNC: 14 U/L — SIGNIFICANT CHANGE UP (ref 0–41)
ANION GAP SERPL CALC-SCNC: 10 MMOL/L — SIGNIFICANT CHANGE UP (ref 7–14)
AST SERPL-CCNC: 17 U/L — SIGNIFICANT CHANGE UP (ref 0–41)
BILIRUB SERPL-MCNC: 1.1 MG/DL — SIGNIFICANT CHANGE UP (ref 0.2–1.2)
BUN SERPL-MCNC: 14 MG/DL — SIGNIFICANT CHANGE UP (ref 10–20)
CALCIUM SERPL-MCNC: 10.1 MG/DL — SIGNIFICANT CHANGE UP (ref 8.5–10.1)
CHLORIDE SERPL-SCNC: 104 MMOL/L — SIGNIFICANT CHANGE UP (ref 98–110)
CO2 SERPL-SCNC: 27 MMOL/L — SIGNIFICANT CHANGE UP (ref 17–32)
CREAT SERPL-MCNC: 0.6 MG/DL — LOW (ref 0.7–1.5)
GLUCOSE BLDC GLUCOMTR-MCNC: 108 MG/DL — HIGH (ref 70–99)
GLUCOSE SERPL-MCNC: 95 MG/DL — SIGNIFICANT CHANGE UP (ref 70–99)
HCT VFR BLD CALC: 36.4 % — LOW (ref 37–47)
HGB BLD-MCNC: 11.7 G/DL — LOW (ref 12–16)
MCHC RBC-ENTMCNC: 27.4 PG — SIGNIFICANT CHANGE UP (ref 27–31)
MCHC RBC-ENTMCNC: 32.1 G/DL — SIGNIFICANT CHANGE UP (ref 32–37)
MCV RBC AUTO: 85.2 FL — SIGNIFICANT CHANGE UP (ref 81–99)
NRBC # BLD: 0 /100 WBCS — SIGNIFICANT CHANGE UP (ref 0–0)
PLATELET # BLD AUTO: 198 K/UL — SIGNIFICANT CHANGE UP (ref 130–400)
POTASSIUM SERPL-MCNC: 3.8 MMOL/L — SIGNIFICANT CHANGE UP (ref 3.5–5)
POTASSIUM SERPL-SCNC: 3.8 MMOL/L — SIGNIFICANT CHANGE UP (ref 3.5–5)
PROT SERPL-MCNC: 5.9 G/DL — LOW (ref 6–8)
RBC # BLD: 4.27 M/UL — SIGNIFICANT CHANGE UP (ref 4.2–5.4)
RBC # FLD: 14.1 % — SIGNIFICANT CHANGE UP (ref 11.5–14.5)
SODIUM SERPL-SCNC: 141 MMOL/L — SIGNIFICANT CHANGE UP (ref 135–146)
WBC # BLD: 9.47 K/UL — SIGNIFICANT CHANGE UP (ref 4.8–10.8)
WBC # FLD AUTO: 9.47 K/UL — SIGNIFICANT CHANGE UP (ref 4.8–10.8)

## 2019-12-09 PROCEDURE — 99233 SBSQ HOSP IP/OBS HIGH 50: CPT

## 2019-12-09 RX ADMIN — OXYCODONE AND ACETAMINOPHEN 1 TABLET(S): 5; 325 TABLET ORAL at 10:25

## 2019-12-09 RX ADMIN — OXYCODONE AND ACETAMINOPHEN 1 TABLET(S): 5; 325 TABLET ORAL at 01:12

## 2019-12-09 RX ADMIN — Medication 30 MILLIGRAM(S): at 06:00

## 2019-12-09 RX ADMIN — OXYCODONE AND ACETAMINOPHEN 1 TABLET(S): 5; 325 TABLET ORAL at 09:55

## 2019-12-09 RX ADMIN — PANTOPRAZOLE SODIUM 40 MILLIGRAM(S): 20 TABLET, DELAYED RELEASE ORAL at 06:00

## 2019-12-09 RX ADMIN — Medication 125 MILLIGRAM(S): at 17:31

## 2019-12-09 RX ADMIN — OXYCODONE AND ACETAMINOPHEN 1 TABLET(S): 5; 325 TABLET ORAL at 23:24

## 2019-12-09 RX ADMIN — Medication 125 MILLIGRAM(S): at 23:28

## 2019-12-09 RX ADMIN — Medication 125 MILLIGRAM(S): at 13:26

## 2019-12-09 RX ADMIN — OXYCODONE AND ACETAMINOPHEN 1 TABLET(S): 5; 325 TABLET ORAL at 02:00

## 2019-12-09 RX ADMIN — Medication 12.5 MILLIGRAM(S): at 06:00

## 2019-12-09 RX ADMIN — Medication 100 MILLIGRAM(S): at 22:28

## 2019-12-09 RX ADMIN — Medication 100 MILLIGRAM(S): at 14:42

## 2019-12-09 RX ADMIN — RIVAROXABAN 20 MILLIGRAM(S): KIT at 17:31

## 2019-12-09 RX ADMIN — Medication 125 MILLIGRAM(S): at 06:01

## 2019-12-09 RX ADMIN — Medication 100 MILLIGRAM(S): at 06:00

## 2019-12-09 NOTE — PROGRESS NOTE ADULT - SUBJECTIVE AND OBJECTIVE BOX
PATRICIA SPRAGUE  69y, Female  Allergy: No Known Allergies    Hospital Day: 2d    Patient seen and examined earlier today. No acute events overnight.    PMH/PSH:  PAST MEDICAL & SURGICAL HISTORY:  Gastroesophageal reflux disease  Obesity  Diabetes mellitus  HTN (hypertension)  History of cholecystectomy  H/O hernia repair: 2011 and revised in 2013    VITALS:  T(F): 96.9 (12-09-19 @ 06:07), Max: 98.5 (12-08-19 @ 14:06)  HR: 56 (12-09-19 @ 06:07)  BP: 135/65 (12-09-19 @ 06:07) (135/65 - 143/67)  RR: 16 (12-09-19 @ 06:07)  SpO2: --    TESTS & MEASUREMENTS:  Weight (Kg):   BMI (kg/m2): 36.1 (12-07)    12-07-19 @ 07:01  -  12-08-19 @ 07:00  --------------------------------------------------------  IN: 0 mL / OUT: 600 mL / NET: -600 mL    12-08-19 @ 07:01  -  12-09-19 @ 07:00  --------------------------------------------------------  IN: 0 mL / OUT: 1200 mL / NET: -1200 mL                            11.7   9.47  )-----------( 198      ( 09 Dec 2019 07:04 )             36.4       12-09    141  |  104  |  14  ----------------------------<  95  3.8   |  27  |  0.6<L>    Ca    10.1      09 Dec 2019 07:04  Phos  2.9     12-08  Mg     1.4     12-08    TPro  5.9<L>  /  Alb  3.6  /  TBili  1.1  /  DBili  x   /  AST  17  /  ALT  14  /  AlkPhos  105  12-09    LIVER FUNCTIONS - ( 09 Dec 2019 07:04 )  Alb: 3.6 g/dL / Pro: 5.9 g/dL / ALK PHOS: 105 U/L / ALT: 14 U/L / AST: 17 U/L / GGT: x                 Culture - Blood (collected 12-07-19 @ 01:10)  Source: .Blood Blood-Peripheral  Preliminary Report (12-08-19 @ 19:01):    No growth to date.    Culture - Blood (collected 12-07-19 @ 01:10)  Source: .Blood Blood-Peripheral  Preliminary Report (12-08-19 @ 19:01):    No growth to date.    MEDICATIONS:  MEDICATIONS  (STANDING):  ceFAZolin   IVPB 1000 milliGRAM(s) IV Intermittent every 8 hours  ceFAZolin   IVPB      chlorhexidine 4% Liquid 1 Application(s) Topical <User Schedule>  hydrochlorothiazide 12.5 milliGRAM(s) Oral daily  NIFEdipine XL 30 milliGRAM(s) Oral daily  pantoprazole    Tablet 40 milliGRAM(s) Oral before breakfast  rivaroxaban 20 milliGRAM(s) Oral with dinner  vancomycin    Solution 125 milliGRAM(s) Oral every 6 hours    MEDICATIONS  (PRN):  oxycodone    5 mG/acetaminophen 325 mG 1 Tablet(s) Oral every 8 hours PRN Severe Pain (7 - 10)      HOME MEDICATIONS:  hydroCHLOROthiazide 12.5 mg oral capsule (12-06)  NIFEdipine 30 mg oral tablet, extended release (12-06)  oxycodone-acetaminophen 5 mg-325 mg oral tablet (12-06)  pantoprazole 40 mg oral delayed release tablet (12-06)  rivaroxaban 20 mg oral tablet (12-06)      REVIEW OF SYSTEMS:  All other review of systems is negative unless indicated above.     PHYSICAL EXAM:  GENERAL: NAD  HEENT: No Swelling  CHEST/LUNG: Good air entry, No wheezing  HEART: RRR, No murmurs  ABDOMEN: Soft, Bowel sounds present  EXTREMITIES:  No clubbing

## 2019-12-09 NOTE — PROGRESS NOTE ADULT - ASSESSMENT
A  69 female with  abdominal Wound s/p recent excision of sinus tract/abscess , c-diff positive.       Plan:   -continue antibiotics   -will continue daily wound packing  -pt to be reassessed by attending today

## 2019-12-09 NOTE — PROGRESS NOTE ADULT - SUBJECTIVE AND OBJECTIVE BOX
Subjective:     A  69 female with  abdominal Wound s/p recent excision of sinus tract/abscess , c-diff positive examined on bed side. Pt afebrile overnight, wbc 9. Pt denies fever or chills. pt denies abdominal pain. Pt tolerating diet, denies nausea or vomiting. Pt r        Vital Signs Last 24 Hrs  T(C): 36.1 (09 Dec 2019 06:07), Max: 36.9 (08 Dec 2019 14:06)  T(F): 96.9 (09 Dec 2019 06:07), Max: 98.5 (08 Dec 2019 14:06)  HR: 56 (09 Dec 2019 06:07) (56 - 59)  BP: 135/65 (09 Dec 2019 06:07) (135/65 - 143/67)  BP(mean): --  RR: 16 (09 Dec 2019 06:07) (16 - 16)  SpO2: --    General Appearance: Appears well, NAD  Neck: Supple  Chest: Equal expansion bilaterally, equal breath sounds  CV: Pulse regular presently  Abdomen: Soft, NT/ND,+bs,  no rebound/gaurding  Extremities: Grossly symmetric, no calf tend b/l    Jennyfer:  KEVENT:  BULMARO:    I&O's Summary    08 Dec 2019 07:01  -  09 Dec 2019 07:00  --------------------------------------------------------  IN: 0 mL / OUT: 1200 mL / NET: -1200 mL      I&O's Detail    08 Dec 2019 07:01  -  09 Dec 2019 07:00  --------------------------------------------------------  IN:  Total IN: 0 mL    OUT:    Voided: 1200 mL  Total OUT: 1200 mL    Total NET: -1200 mL          MEDICATIONS  (STANDING):  ceFAZolin   IVPB 1000 milliGRAM(s) IV Intermittent every 8 hours  ceFAZolin   IVPB      chlorhexidine 4% Liquid 1 Application(s) Topical <User Schedule>  hydrochlorothiazide 12.5 milliGRAM(s) Oral daily  NIFEdipine XL 30 milliGRAM(s) Oral daily  pantoprazole    Tablet 40 milliGRAM(s) Oral before breakfast  rivaroxaban 20 milliGRAM(s) Oral with dinner  vancomycin    Solution 125 milliGRAM(s) Oral every 6 hours    MEDICATIONS  (PRN):  oxycodone    5 mG/acetaminophen 325 mG 1 Tablet(s) Oral every 8 hours PRN Severe Pain (7 - 10)      LABS:                        11.7   9.47  )-----------( 198      ( 09 Dec 2019 07:04 )             36.4     12-09    141  |  104  |  14  ----------------------------<  95  3.8   |  27  |  0.6<L>    Ca    10.1      09 Dec 2019 07:04  Phos  2.9     12-08  Mg     1.4     12-08    TPro  5.9<L>  /  Alb  3.6  /  TBili  1.1  /  DBili  x   /  AST  17  /  ALT  14  /  AlkPhos  105  12-09          RADIOLOGY & ADDITIONAL STUDIES: Subjective:     A  69 female with  abdominal Wound s/p recent excision of sinus tract/abscess , c-diff positive examined on bed side. Pt afebrile overnight, wbc 9. Pt denies fever or chills. pt denies abdominal pain. Pt tolerating diet, denies nausea or vomiting. Pt having bowel movements. Pt denies blood in stool.         Vital Signs Last 24 Hrs  T(C): 36.1 (09 Dec 2019 06:07), Max: 36.9 (08 Dec 2019 14:06)  T(F): 96.9 (09 Dec 2019 06:07), Max: 98.5 (08 Dec 2019 14:06)  HR: 56 (09 Dec 2019 06:07) (56 - 59)  BP: 135/65 (09 Dec 2019 06:07) (135/65 - 143/67)  BP(mean): --  RR: 16 (09 Dec 2019 06:07) (16 - 16)  SpO2: --    General Appearance: Appears well, NAD  Neck: Supple  Chest: Equal expansion bilaterally, equal breath sounds  CV: Pulse regular presently  Abdomen: RUQ dressing in place, dry , no bleeding, Soft, NT/ND,+bs,  no rebound/gaurding  Extremities: Grossly symmetric, no calf tend b/l        I&O's Summary    08 Dec 2019 07:01  -  09 Dec 2019 07:00  --------------------------------------------------------  IN: 0 mL / OUT: 1200 mL / NET: -1200 mL      I&O's Detail    08 Dec 2019 07:01  -  09 Dec 2019 07:00  --------------------------------------------------------  IN:  Total IN: 0 mL    OUT:    Voided: 1200 mL  Total OUT: 1200 mL    Total NET: -1200 mL          MEDICATIONS  (STANDING):  ceFAZolin   IVPB 1000 milliGRAM(s) IV Intermittent every 8 hours  ceFAZolin   IVPB      chlorhexidine 4% Liquid 1 Application(s) Topical <User Schedule>  hydrochlorothiazide 12.5 milliGRAM(s) Oral daily  NIFEdipine XL 30 milliGRAM(s) Oral daily  pantoprazole    Tablet 40 milliGRAM(s) Oral before breakfast  rivaroxaban 20 milliGRAM(s) Oral with dinner  vancomycin    Solution 125 milliGRAM(s) Oral every 6 hours    MEDICATIONS  (PRN):  oxycodone    5 mG/acetaminophen 325 mG 1 Tablet(s) Oral every 8 hours PRN Severe Pain (7 - 10)      LABS:                        11.7   9.47  )-----------( 198      ( 09 Dec 2019 07:04 )             36.4     12-09    141  |  104  |  14  ----------------------------<  95  3.8   |  27  |  0.6<L>    Ca    10.1      09 Dec 2019 07:04  Phos  2.9     12-08  Mg     1.4     12-08    TPro  5.9<L>  /  Alb  3.6  /  TBili  1.1  /  DBili  x   /  AST  17  /  ALT  14  /  AlkPhos  105  12-09          RADIOLOGY & ADDITIONAL STUDIES:

## 2019-12-09 NOTE — PROGRESS NOTE ADULT - ASSESSMENT
68 yo F with above PMHx presented with complaint of inability to care for herself and inability to ambulate s/p recent discharge from [facility].    1. Deconditioning, likely due to ongoing C.Diff infection: C diff positive this admission, Continue vancomycin 125 q6 x 14 days until Dec 22. DC planning in progress  2. Normocytic anemia: Monitor  3. HTN: Continue nifedipine  4. Chronic Afib: Continue xarelto, rate controlled off meds  5. GERD: Continue protonix  6. Abdominal Wound likely cellulitis s/p recent excision of sinus tract/abscess: Surgery following for daily wound dressing/packing; Continue IV cefazolin for now.    GI/DVT PPX    #Progress Note Handoff  Pending (specify): Resolution of diarrhea  Family discussion: d/w pt regarding c.diff infection  Disposition: NH

## 2019-12-10 ENCOUNTER — TRANSCRIPTION ENCOUNTER (OUTPATIENT)
Age: 69
End: 2019-12-10

## 2019-12-10 LAB
GLUCOSE BLDC GLUCOMTR-MCNC: 101 MG/DL — HIGH (ref 70–99)
GLUCOSE BLDC GLUCOMTR-MCNC: 103 MG/DL — HIGH (ref 70–99)
GLUCOSE BLDC GLUCOMTR-MCNC: 109 MG/DL — HIGH (ref 70–99)
GLUCOSE BLDC GLUCOMTR-MCNC: 117 MG/DL — HIGH (ref 70–99)

## 2019-12-10 PROCEDURE — 99233 SBSQ HOSP IP/OBS HIGH 50: CPT

## 2019-12-10 RX ORDER — VANCOMYCIN HCL 1 G
1 VIAL (EA) INTRAVENOUS
Qty: 40 | Refills: 0
Start: 2019-12-10 | End: 2019-12-19

## 2019-12-10 RX ADMIN — Medication 125 MILLIGRAM(S): at 11:09

## 2019-12-10 RX ADMIN — Medication 100 MILLIGRAM(S): at 21:59

## 2019-12-10 RX ADMIN — Medication 12.5 MILLIGRAM(S): at 06:08

## 2019-12-10 RX ADMIN — Medication 125 MILLIGRAM(S): at 06:08

## 2019-12-10 RX ADMIN — Medication 100 MILLIGRAM(S): at 14:48

## 2019-12-10 RX ADMIN — OXYCODONE AND ACETAMINOPHEN 1 TABLET(S): 5; 325 TABLET ORAL at 00:26

## 2019-12-10 RX ADMIN — Medication 125 MILLIGRAM(S): at 17:26

## 2019-12-10 RX ADMIN — RIVAROXABAN 20 MILLIGRAM(S): KIT at 17:25

## 2019-12-10 RX ADMIN — Medication 100 MILLIGRAM(S): at 06:07

## 2019-12-10 RX ADMIN — Medication 30 MILLIGRAM(S): at 06:08

## 2019-12-10 RX ADMIN — CHLORHEXIDINE GLUCONATE 1 APPLICATION(S): 213 SOLUTION TOPICAL at 06:08

## 2019-12-10 RX ADMIN — PANTOPRAZOLE SODIUM 40 MILLIGRAM(S): 20 TABLET, DELAYED RELEASE ORAL at 06:08

## 2019-12-10 RX ADMIN — Medication 125 MILLIGRAM(S): at 23:57

## 2019-12-10 NOTE — PROGRESS NOTE ADULT - SUBJECTIVE AND OBJECTIVE BOX
PATRICIA SPRAGUE  69y, Female  Allergy: No Known Allergies    Hospital Day: 3d    Patient seen and examined earlier today. No acute events overnight. Still having diarrhea.    PMH/PSH:  PAST MEDICAL & SURGICAL HISTORY:  Gastroesophageal reflux disease  Obesity  Diabetes mellitus  HTN (hypertension)  History of cholecystectomy  H/O hernia repair: 2011 and revised in 2013    VITALS:  T(F): 98.2 (12-10-19 @ 14:38), Max: 98.2 (12-10-19 @ 14:38)  HR: 59 (12-10-19 @ 14:38)  BP: 184/77 (12-10-19 @ 14:38) (139/67 - 184/77)  RR: 16 (12-10-19 @ 14:38)  SpO2: --    TESTS & MEASUREMENTS:  Weight (Kg):   BMI (kg/m2): 36.1 (12-07)    12-08-19 @ 07:01  -  12-09-19 @ 07:00  --------------------------------------------------------  IN: 0 mL / OUT: 1200 mL / NET: -1200 mL    12-09-19 @ 07:01  -  12-10-19 @ 07:00  --------------------------------------------------------  IN: 0 mL / OUT: 750 mL / NET: -750 mL                          11.7   9.47  )-----------( 198      ( 09 Dec 2019 07:04 )             36.4     12-09    141  |  104  |  14  ----------------------------<  95  3.8   |  27  |  0.6<L>    Ca    10.1      09 Dec 2019 07:04    TPro  5.9<L>  /  Alb  3.6  /  TBili  1.1  /  DBili  x   /  AST  17  /  ALT  14  /  AlkPhos  105  12-09    LIVER FUNCTIONS - ( 09 Dec 2019 07:04 )  Alb: 3.6 g/dL / Pro: 5.9 g/dL / ALK PHOS: 105 U/L / ALT: 14 U/L / AST: 17 U/L / GGT: x           Culture - Blood (collected 12-07-19 @ 01:10)  Source: .Blood Blood-Peripheral  Preliminary Report (12-08-19 @ 19:01):    No growth to date.    Culture - Blood (collected 12-07-19 @ 01:10)  Source: .Blood Blood-Peripheral  Preliminary Report (12-08-19 @ 19:01):    No growth to date.    MEDICATIONS:  MEDICATIONS  (STANDING):  ceFAZolin   IVPB 1000 milliGRAM(s) IV Intermittent every 8 hours  ceFAZolin   IVPB      chlorhexidine 4% Liquid 1 Application(s) Topical <User Schedule>  hydrochlorothiazide 12.5 milliGRAM(s) Oral daily  NIFEdipine XL 30 milliGRAM(s) Oral daily  pantoprazole    Tablet 40 milliGRAM(s) Oral before breakfast  rivaroxaban 20 milliGRAM(s) Oral with dinner  vancomycin    Solution 125 milliGRAM(s) Oral every 6 hours    MEDICATIONS  (PRN):  oxycodone    5 mG/acetaminophen 325 mG 1 Tablet(s) Oral every 8 hours PRN Severe Pain (7 - 10)    HOME MEDICATIONS:  hydroCHLOROthiazide 12.5 mg oral capsule (12-06)  NIFEdipine 30 mg oral tablet, extended release (12-06)  oxycodone-acetaminophen 5 mg-325 mg oral tablet (12-06)  pantoprazole 40 mg oral delayed release tablet (12-06)  rivaroxaban 20 mg oral tablet (12-06)      REVIEW OF SYSTEMS:  All other review of systems is negative unless indicated above.     PHYSICAL EXAM:  GENERAL: NAD  HEENT: No Swelling  CHEST/LUNG: Good air entry, No wheezing  HEART: RRR, No murmurs  ABDOMEN: Soft, Bowel sounds present  EXTREMITIES:  No clubbing

## 2019-12-10 NOTE — DISCHARGE NOTE PROVIDER - CARE PROVIDER_API CALL
Halie Banda)  Surgery  [address] [place] [address]  Phone: [phone]  Fax: [phone]  Follow Up Time: 1 month

## 2019-12-10 NOTE — DISCHARGE NOTE PROVIDER - NSDCFUADDAPPT_GEN_ALL_CORE_FT
Follow-up with surgery and PCP within 1-2 weeks Follow-up with PCP in one month to document clearance of C.Diff infection. Detail Level: Zone Plan: Patient may call for prescription of Tretinoin 0.05% cream if desired Otc Regimen: Differin: apply a pea sized amount to acne affected areas nightly as tolerated

## 2019-12-10 NOTE — DISCHARGE NOTE PROVIDER - NSDCMRMEDTOKEN_GEN_ALL_CORE_FT
hydroCHLOROthiazide 12.5 mg oral capsule: 1 cap(s) orally once a day  NIFEdipine 30 mg oral tablet, extended release: 1 tab(s) orally once a day  oxycodone-acetaminophen 5 mg-325 mg oral tablet: 1 tab(s) orally every 8 hours, As Needed - 6) for severe pain   pantoprazole 40 mg oral delayed release tablet: 1 tab(s) orally once a day (before a meal)  rivaroxaban 20 mg oral tablet: 1 tab(s) orally once a day (before a meal) hydroCHLOROthiazide 12.5 mg oral capsule: 1 cap(s) orally once a day  Mag-G 500 mg oral tablet: 1 tab(s) orally every 8 hours  NIFEdipine 30 mg oral tablet, extended release: 1 tab(s) orally once a day  nystatin 100,000 units/g topical powder: 1 application topically 2 times a day  oxycodone-acetaminophen 5 mg-325 mg oral tablet: 1 tab(s) orally every 8 hours, As Needed - 6) for severe pain   pantoprazole 40 mg oral delayed release tablet: 1 tab(s) orally once a day (before a meal)  rivaroxaban 20 mg oral tablet: 1 tab(s) orally once a day (before a meal)

## 2019-12-10 NOTE — DISCHARGE NOTE PROVIDER - NSDCCPCAREPLAN_GEN_ALL_CORE_FT
PRINCIPAL DISCHARGE DIAGNOSIS  Diagnosis: Weakness generalized  Assessment and Plan of Treatment:       SECONDARY DISCHARGE DIAGNOSES  Diagnosis: Clostridium enterocolitis  Assessment and Plan of Treatment: Complete vancomycin 125mg QID through 12/20/2019    Diagnosis: Abdominal wall cellulitis  Assessment and Plan of Treatment: Complete augmentin 500mg BID through 12/20/2019.    Diagnosis: Unsteady gait  Assessment and Plan of Treatment: PRINCIPAL DISCHARGE DIAGNOSIS  Diagnosis: Weakness generalized  Assessment and Plan of Treatment: Physical therapy to be done at rehab      SECONDARY DISCHARGE DIAGNOSES  Diagnosis: Clostridium enterocolitis  Assessment and Plan of Treatment: Completed course of vancomycin with resolution. Follow-up with PCP in 1 month to assess for clearance of infection.    Diagnosis: Abdominal wall cellulitis  Assessment and Plan of Treatment: Complete course of IV cefazolin. Pt needs daily irrigation, packing, and abdominal wound dressing changes. Follow-up with surgery outpatient.    Diagnosis: Unsteady gait  Assessment and Plan of Treatment:

## 2019-12-10 NOTE — DISCHARGE NOTE PROVIDER - HOSPITAL COURSE
68 yo F with PMHX of HTN, DM II, Obesity, AFIB on Xarelto,Hernia s/p Repair, Enterocutaneous Fistula /Abdominal Wall Abscess s/p Excision of Sinus Tract (September 2019) presents from home, recently discharged from [facility] this past Tuesday, with complaint of bilateral lower extremity weakness and inability to care for herself. Patient currently lives with her son as her own house has too many flights of stairs, states that her son is unable to care for her as well, desires to return to [facility]. Patient denies fever, chills, nausea, vomiting, sick contacts, recent travel, abdominal pain.         Pt started on IV abx to complete course for abdominal wound. Surgery evaluated patient and instructed daily wound dressing packing and changing. Pt found to have c.diff. Started on vancomycin. 68 yo F with PMHX of HTN, DM II, Obesity, AFIB on Xarelto,Hernia s/p Repair, Enterocutaneous Fistula /Abdominal Wall Abscess s/p Excision of Sinus Tract (September 2019) presents from home, recently discharged from [facility] this past Tuesday, with complaint of bilateral lower extremity weakness and inability to care for herself. Patient currently lives with her son as her own house has too many flights of stairs, states that her son is unable to care for her as well, desires to return to [facility]. Patient denies fever, chills, nausea, vomiting, sick contacts, recent travel, abdominal pain.         Pt started on IV abx to complete course for abdominal wound. Surgery evaluated patient and instructed daily wound dressing packing and changing. Pt found to have c.diff. Completed course of PO vancomycin with resolution of diarrhea.

## 2019-12-10 NOTE — PROGRESS NOTE ADULT - ASSESSMENT
68 yo F with above PMHx presented with complaint of inability to care for herself and inability to ambulate s/p recent discharge from [facility].    1. Deconditioning, likely due to ongoing C.Diff infection: C diff positive this admission, Continue vancomycin 125 q6 x 14 days until Dec 22. Can be discharged once diarrhea resolves.  2. Normocytic anemia: Monitor  3. HTN: Continue nifedipine  4. Chronic Afib: Continue xarelto, rate controlled off meds  5. GERD: Continue protonix  6. Abdominal Wound likely cellulitis s/p recent excision of sinus tract/abscess: Surgery following for daily wound dressing/packing; Continue IV cefazolin for now.    GI/DVT PPX    #Progress Note Handoff  Pending (specify): Resolution of diarrhea  Family discussion: d/w pt regarding c.diff infection  Disposition: NH 68 yo F with above PMHx presented with complaint of inability to care for herself and inability to ambulate s/p recent discharge from [facility].    1. Deconditioning, likely due to ongoing C.Diff infection: C diff positive this admission, Continue vancomycin 125 q6 x 14 days until Dec 22. Can be discharged once diarrhea resolves.  2. Normocytic anemia: Monitor  3. HTN: Continue nifedipine  4. Chronic Afib: Continue xarelto, rate controlled off meds  5. GERD: Continue protonix  6. Abdominal Wound likely cellulitis s/p recent excision of sinus tract/abscess: Surgery following for daily wound dressing/packing; Continue IV cefazolin for now. When discharged, pt can be switched to augmentin 500 BID through 12/20/2019    GI/DVT PPX    #Progress Note Handoff  Pending (specify): Resolution of diarrhea  Family discussion: d/w pt regarding c.diff infection  Disposition: NH

## 2019-12-10 NOTE — PROGRESS NOTE ADULT - SUBJECTIVE AND OBJECTIVE BOX
DIAGNOSIS:   HOSPITAL DAY #:    STATUS POST:    POST OPERATIVE DAY #:     Vital Signs Last 24 Hrs  T(C): 37.2 (10 Dec 2019 22:11), Max: 37.2 (10 Dec 2019 22:11)  T(F): 99 (10 Dec 2019 22:11), Max: 99 (10 Dec 2019 22:11)  HR: 64 (10 Dec 2019 22:11) (59 - 64)  BP: 146/70 (10 Dec 2019 22:11) (139/67 - 184/77)  BP(mean): --  RR: 16 (10 Dec 2019 22:11) (16 - 16)  SpO2: --    SUBJECTIVE: Pt seen    Pain: YES  [ ]   NO [ ]   Nausea: [ ] YES [ ] NO           Vomiting: [ ] YES [ ] NO  Flatus: [ ] YES [ ] NO             Bowel Movement: [ ] YES [ ] NO     Void: [ ]YES [ ]No      BULMARO DRAINAGE: SIGNIFICANT [ ]   NOT SIGNIFICANT [ ]   NOT APPLICABLE [ ]  YES [ ] NO    General Appearance: Appears well, NAD  Neck: Supple  Chest: Equal expansion bilaterally, equal breath sounds  CV: Pulse regular presently  Abdomen: Soft [x ] YES [ ]NO  DISTENDED [ ] YES [x ] NO TENDERNESS [ ]YES [x ]NO  INCISIONS: HEALING WELL [x ] YES  [ ] NO ERYTHEMA [ ] YES [ ] NO DRAINAGE [ ] YES  [ ] NO  Extremities: Grossly symmetric, CALF TENDERNESS [ ] YES  [ ] NO      LABS:                        11.7   9.47  )-----------( 198      ( 09 Dec 2019 07:04 )             36.4     12-09    141  |  104  |  14  ----------------------------<  95  3.8   |  27  |  0.6<L>    Ca    10.1      09 Dec 2019 07:04    TPro  5.9<L>  /  Alb  3.6  /  TBili  1.1  /  DBili  x   /  AST  17  /  ALT  14  /  AlkPhos  105  12-09            ASSESSMENT:     GOOD POST OP COURSE [ ]  YES  [ ] NO  CONDITION IMPROVING  []  YES  [ ]  NO          PLAN:    CONTINUE PRESENT MANAGEMENT  [ ] YES  [ ] NO

## 2019-12-11 LAB
ALBUMIN SERPL ELPH-MCNC: 4.1 G/DL — SIGNIFICANT CHANGE UP (ref 3.5–5.2)
ALP SERPL-CCNC: 125 U/L — HIGH (ref 30–115)
ALT FLD-CCNC: 19 U/L — SIGNIFICANT CHANGE UP (ref 0–41)
ANION GAP SERPL CALC-SCNC: 13 MMOL/L — SIGNIFICANT CHANGE UP (ref 7–14)
AST SERPL-CCNC: 27 U/L — SIGNIFICANT CHANGE UP (ref 0–41)
BILIRUB SERPL-MCNC: 1.3 MG/DL — HIGH (ref 0.2–1.2)
BUN SERPL-MCNC: 14 MG/DL — SIGNIFICANT CHANGE UP (ref 10–20)
CALCIUM SERPL-MCNC: 10.7 MG/DL — HIGH (ref 8.5–10.1)
CHLORIDE SERPL-SCNC: 102 MMOL/L — SIGNIFICANT CHANGE UP (ref 98–110)
CO2 SERPL-SCNC: 23 MMOL/L — SIGNIFICANT CHANGE UP (ref 17–32)
CREAT SERPL-MCNC: 0.6 MG/DL — LOW (ref 0.7–1.5)
GLUCOSE BLDC GLUCOMTR-MCNC: 111 MG/DL — HIGH (ref 70–99)
GLUCOSE BLDC GLUCOMTR-MCNC: 121 MG/DL — HIGH (ref 70–99)
GLUCOSE BLDC GLUCOMTR-MCNC: 134 MG/DL — HIGH (ref 70–99)
GLUCOSE BLDC GLUCOMTR-MCNC: 92 MG/DL — SIGNIFICANT CHANGE UP (ref 70–99)
GLUCOSE SERPL-MCNC: 112 MG/DL — HIGH (ref 70–99)
HCT VFR BLD CALC: 42.5 % — SIGNIFICANT CHANGE UP (ref 37–47)
HGB BLD-MCNC: 13.6 G/DL — SIGNIFICANT CHANGE UP (ref 12–16)
MCHC RBC-ENTMCNC: 27.2 PG — SIGNIFICANT CHANGE UP (ref 27–31)
MCHC RBC-ENTMCNC: 32 G/DL — SIGNIFICANT CHANGE UP (ref 32–37)
MCV RBC AUTO: 85 FL — SIGNIFICANT CHANGE UP (ref 81–99)
NRBC # BLD: 0 /100 WBCS — SIGNIFICANT CHANGE UP (ref 0–0)
PLATELET # BLD AUTO: 201 K/UL — SIGNIFICANT CHANGE UP (ref 130–400)
POTASSIUM SERPL-MCNC: 3.6 MMOL/L — SIGNIFICANT CHANGE UP (ref 3.5–5)
POTASSIUM SERPL-SCNC: 3.6 MMOL/L — SIGNIFICANT CHANGE UP (ref 3.5–5)
PROT SERPL-MCNC: 6.8 G/DL — SIGNIFICANT CHANGE UP (ref 6–8)
RBC # BLD: 5 M/UL — SIGNIFICANT CHANGE UP (ref 4.2–5.4)
RBC # FLD: 14.1 % — SIGNIFICANT CHANGE UP (ref 11.5–14.5)
SODIUM SERPL-SCNC: 138 MMOL/L — SIGNIFICANT CHANGE UP (ref 135–146)
WBC # BLD: 10.1 K/UL — SIGNIFICANT CHANGE UP (ref 4.8–10.8)
WBC # FLD AUTO: 10.1 K/UL — SIGNIFICANT CHANGE UP (ref 4.8–10.8)

## 2019-12-11 PROCEDURE — 99233 SBSQ HOSP IP/OBS HIGH 50: CPT

## 2019-12-11 RX ADMIN — Medication 125 MILLIGRAM(S): at 05:33

## 2019-12-11 RX ADMIN — Medication 12.5 MILLIGRAM(S): at 05:33

## 2019-12-11 RX ADMIN — Medication 125 MILLIGRAM(S): at 17:16

## 2019-12-11 RX ADMIN — Medication 100 MILLIGRAM(S): at 05:33

## 2019-12-11 RX ADMIN — Medication 125 MILLIGRAM(S): at 12:37

## 2019-12-11 RX ADMIN — PANTOPRAZOLE SODIUM 40 MILLIGRAM(S): 20 TABLET, DELAYED RELEASE ORAL at 05:33

## 2019-12-11 RX ADMIN — RIVAROXABAN 20 MILLIGRAM(S): KIT at 17:16

## 2019-12-11 RX ADMIN — Medication 100 MILLIGRAM(S): at 13:52

## 2019-12-11 RX ADMIN — Medication 30 MILLIGRAM(S): at 05:33

## 2019-12-11 NOTE — DIETITIAN INITIAL EVALUATION ADULT. - ENERGY NEEDS
kcal: 8864-3132 (MSJ x 1-1.1 AF) Obese BMI considered  protein: 59-71 g (1-1.2 g/kg IBW) same as above  fluid: 1mL/kcal or per LIP

## 2019-12-11 NOTE — DIETITIAN INITIAL EVALUATION ADULT. - ADD RECOMMEND
continue current diet order, nutrition education for diarrhea/wt loss/general healthful nutrition + handouts provided, consider adding probiotic as pt with persistent diarrhea/c diff, encourage po intake, provide assistance with meals PRN

## 2019-12-11 NOTE — PROGRESS NOTE ADULT - SUBJECTIVE AND OBJECTIVE BOX
PATRICIA SPRAGUE  69y, Female  Allergy: No Known Allergies      Patient seen and examined earlier today. No acute events overnight. Still having diarrhea - three episodes so far.  Discussed plan of care with pt's daughter, Stacy [phone].  Anticipated for tomorrow if diarrhea resolves.     PMH/PSH:  PAST MEDICAL & SURGICAL HISTORY:  Gastroesophageal reflux disease  Obesity  Diabetes mellitus  HTN (hypertension)  History of cholecystectomy  H/O hernia repair: 2011 and revised in 2013    VITALS:  Vital Signs Last 24 Hrs  T(C): 36.1 (11 Dec 2019 05:15), Max: 37.2 (10 Dec 2019 22:11)  T(F): 97 (11 Dec 2019 05:15), Max: 99 (10 Dec 2019 22:11)  HR: 62 (11 Dec 2019 05:15) (59 - 64)  BP: 143/75 (11 Dec 2019 05:15) (143/71 - 184/77)  BP(mean): --  RR: 16 (11 Dec 2019 05:15) (16 - 16)  SpO2: --      REVIEW OF SYSTEMS:  All other review of systems is negative unless indicated above.     PHYSICAL EXAM:  GENERAL: NAD, well-groomed, well-developed  HEAD:  Atraumatic, Normocephalic  EYES: EOMI, PERRLA, conjunctiva and sclera clear  NERVOUS SYSTEM:  Alert & Oriented X 4, Good concentration; Motor Strength 5/5 B/L upper and lower extremities; DTRs 2+ intact and symmetric  CHEST/LUNG: Clear to percussion bilaterally; No rales, rhonchi, wheezing, or rubs  HEART: Regular rate and rhythm; No murmurs, rubs, or gallops  ABDOMEN: Soft, Nontender, Nondistended; Bowel sounds present; obese  EXTREMITIES:  2+ Peripheral Pulses, No clubbing, cyanosis, or edema  SKIN: No rashes or lesions      LABS:                                    13.6   10.10 )-----------( 201      ( 11 Dec 2019 08:42 )             42.5     12-11    138  |  102  |  14  ----------------------------<  112<H>  3.6   |  23  |  0.6<L>    Ca    10.7<H>      11 Dec 2019 08:42    TPro  6.8  /  Alb  4.1  /  TBili  1.3<H>  /  DBili  x   /  AST  27  /  ALT  19  /  AlkPhos  125<H>  12-11          MEDICATIONS:  MEDICATIONS  (STANDING):  ceFAZolin   IVPB 1000 milliGRAM(s) IV Intermittent every 8 hours  ceFAZolin   IVPB      chlorhexidine 4% Liquid 1 Application(s) Topical <User Schedule>  hydrochlorothiazide 12.5 milliGRAM(s) Oral daily  NIFEdipine XL 30 milliGRAM(s) Oral daily  pantoprazole    Tablet 40 milliGRAM(s) Oral before breakfast  rivaroxaban 20 milliGRAM(s) Oral with dinner  vancomycin    Solution 125 milliGRAM(s) Oral every 6 hours    MEDICATIONS  (PRN):  oxycodone    5 mG/acetaminophen 325 mG 1 Tablet(s) Oral every 8 hours PRN Severe Pain (7 - 10)

## 2019-12-11 NOTE — DIETITIAN INITIAL EVALUATION ADULT. - RD TO REMAIN AVAILABLE
INTERVENTION: meals and snacks, nutrition education, nutrition related medication management, coordination of care. ME: RD to monitor diet order, energy intake, body composition, NFPF/yes

## 2019-12-11 NOTE — PROGRESS NOTE ADULT - ASSESSMENT
70 yo F with above PMHx presented with complaint of inability to care for herself and inability to ambulate s/p recent discharge from [facility].    1. Deconditioning, likely due to ongoing C.Diff infection: C diff positive this admission, Continue vancomycin 125 q6 x 14 days until Dec 22. Can be discharged once diarrhea resolves.  2. Normocytic anemia: Monitor  3. HTN: Nifedipine, HCTZ  4. Chronic Afib: Continue xarelto, rate controlled off meds  5. GERD: Continue protonix  6. Abdominal Wound likely cellulitis s/p recent excision of sinus tract/abscess: Nursing performinmg daily wound dressing/packing; Continue IV cefazolin for now. When discharged, pt can be switched to augmentin 500 BID through 12/20/2019    GI/DVT PPX    #Progress Note Handoff  Pending (specify): Resolution of diarrhea  Family discussion: d/w pt and her daughter regarding c.diff infection and d/c planning  Disposition: NH when medically stable

## 2019-12-11 NOTE — DIETITIAN INITIAL EVALUATION ADULT. - OTHER INFO
Pt admitted d/t generalized weakness and unsteady gait. Pt with ongoing diarrhea (see above). Pt has abdominal wound (per EMR, likely cellulitis). No edema noted. Pt reports that she has been eating ok, and that she has been trying to eat small portions of food and also reports that she is interested in losing wt. Brought nutrition education for diarrhea, but also offered wt loss/management/general healthful eating nutrition education since pt expresses interest. Discussed handouts in detail with pt and answered all questions accordingly. Pt verbalizes understanding and has no further questions at this time. Pt denies nausea/abdominal pain with meals. No chewing/swallowing difficulties reported. NKFA/intolerances. No vitamins/supplements pta. Unsure if pt has had any recent changes in wt, however no signs of muscle wasting/fat loss as pt is obese.

## 2019-12-12 LAB
CULTURE RESULTS: SIGNIFICANT CHANGE UP
CULTURE RESULTS: SIGNIFICANT CHANGE UP
GLUCOSE BLDC GLUCOMTR-MCNC: 104 MG/DL — HIGH (ref 70–99)
GLUCOSE BLDC GLUCOMTR-MCNC: 93 MG/DL — SIGNIFICANT CHANGE UP (ref 70–99)
GLUCOSE BLDC GLUCOMTR-MCNC: 98 MG/DL — SIGNIFICANT CHANGE UP (ref 70–99)
GLUCOSE BLDC GLUCOMTR-MCNC: 99 MG/DL — SIGNIFICANT CHANGE UP (ref 70–99)
SPECIMEN SOURCE: SIGNIFICANT CHANGE UP
SPECIMEN SOURCE: SIGNIFICANT CHANGE UP

## 2019-12-12 PROCEDURE — 99233 SBSQ HOSP IP/OBS HIGH 50: CPT

## 2019-12-12 RX ORDER — NIFEDIPINE 30 MG
30 TABLET, EXTENDED RELEASE 24 HR ORAL ONCE
Refills: 0 | Status: COMPLETED | OUTPATIENT
Start: 2019-12-12 | End: 2019-12-12

## 2019-12-12 RX ADMIN — Medication 125 MILLIGRAM(S): at 17:44

## 2019-12-12 RX ADMIN — Medication 125 MILLIGRAM(S): at 11:09

## 2019-12-12 RX ADMIN — Medication 100 MILLIGRAM(S): at 21:51

## 2019-12-12 RX ADMIN — Medication 100 MILLIGRAM(S): at 22:00

## 2019-12-12 RX ADMIN — Medication 12.5 MILLIGRAM(S): at 06:11

## 2019-12-12 RX ADMIN — Medication 100 MILLIGRAM(S): at 06:09

## 2019-12-12 RX ADMIN — Medication 125 MILLIGRAM(S): at 06:11

## 2019-12-12 RX ADMIN — PANTOPRAZOLE SODIUM 40 MILLIGRAM(S): 20 TABLET, DELAYED RELEASE ORAL at 06:11

## 2019-12-12 RX ADMIN — CHLORHEXIDINE GLUCONATE 1 APPLICATION(S): 213 SOLUTION TOPICAL at 06:11

## 2019-12-12 RX ADMIN — Medication 30 MILLIGRAM(S): at 21:51

## 2019-12-12 RX ADMIN — Medication 30 MILLIGRAM(S): at 06:11

## 2019-12-12 RX ADMIN — Medication 100 MILLIGRAM(S): at 13:58

## 2019-12-12 RX ADMIN — Medication 125 MILLIGRAM(S): at 23:53

## 2019-12-12 RX ADMIN — RIVAROXABAN 20 MILLIGRAM(S): KIT at 17:43

## 2019-12-12 RX ADMIN — Medication 125 MILLIGRAM(S): at 00:11

## 2019-12-12 NOTE — PROGRESS NOTE ADULT - ASSESSMENT
70 yo F with above PMHx presented with complaint of inability to care for herself and inability to ambulate s/p recent discharge from [facility].    1. Deconditioning, likely due to ongoing C.Diff infection: C diff positive this admission, Continue vancomycin 125 q6 x 14 days until Dec 22. Can be discharged once diarrhea resolves.  2. Normocytic anemia: Monitor  3. HTN: Nifedipine, HCTZ  4. Chronic Afib: Continue xarelto, rate controlled off meds  5. GERD: d/c protonix in view of c.diff  6. Abdominal Wound likely cellulitis s/p recent excision of sinus tract/abscess: Nursing performinmg daily wound dressing/packing; Continue IV cefazolin for now. When discharged, pt can be switched to augmentin 500 BID through 12/20/2019    GI/DVT PPX    #Progress Note Handoff  Pending (specify): Resolution of diarrhea  Family discussion: d/w pt and her daughter regarding c.diff infection and d/c planning on 12/11  Disposition: NH when medically stable

## 2019-12-12 NOTE — PROGRESS NOTE ADULT - SUBJECTIVE AND OBJECTIVE BOX
PATRICIA SPRAGUE  69y, Female  Allergy: No Known Allergies      Patient seen and examined earlier today. No acute events overnight. Still having diarrhea - four episodes so far and 8 episodes yesterday.  Discussed plan of care with pt's daughter, Stacy [phone] on 12/11.  D/C to snf once diarrhea resolves.    PMH/PSH:  PAST MEDICAL & SURGICAL HISTORY:  Gastroesophageal reflux disease  Obesity  Diabetes mellitus  HTN (hypertension)  History of cholecystectomy  H/O hernia repair: 2011 and revised in 2013    VITALS:  Vital Signs Last 24 Hrs  T(C): 36.7 (12 Dec 2019 13:38), Max: 36.7 (12 Dec 2019 13:38)  T(F): 98.1 (12 Dec 2019 13:38), Max: 98.1 (12 Dec 2019 13:38)  HR: 61 (12 Dec 2019 13:38) (55 - 62)  BP: 143/69 (12 Dec 2019 13:38) (143/69 - 167/79)  BP(mean): --  RR: 16 (12 Dec 2019 13:38) (16 - 16)  SpO2: --      REVIEW OF SYSTEMS:  All other review of systems is negative unless indicated above.     PHYSICAL EXAM:  GENERAL: NAD, well-groomed, well-developed  HEAD:  Atraumatic, Normocephalic  EYES: EOMI, PERRLA, conjunctiva and sclera clear  NERVOUS SYSTEM:  Alert & Oriented X 4, Good concentration; Motor Strength 5/5 B/L upper and lower extremities; DTRs 2+ intact and symmetric  CHEST/LUNG: Clear to percussion bilaterally; No rales, rhonchi, wheezing, or rubs  HEART: Regular rate and rhythm; No murmurs, rubs, or gallops  ABDOMEN: Soft, Nontender, Nondistended; Bowel sounds present; obese  EXTREMITIES:  2+ Peripheral Pulses, No clubbing, cyanosis, or edema  SKIN: No rashes or lesions      LABS:                                     13.6   10.10 )-----------( 201      ( 11 Dec 2019 08:42 )             42.5     12-11    138  |  102  |  14  ----------------------------<  112<H>  3.6   |  23  |  0.6<L>    Ca    10.7<H>      11 Dec 2019 08:42    TPro  6.8  /  Alb  4.1  /  TBili  1.3<H>  /  DBili  x   /  AST  27  /  ALT  19  /  AlkPhos  125<H>  12-11          MEDICATIONS:  MEDICATIONS  (STANDING):  ceFAZolin   IVPB 1000 milliGRAM(s) IV Intermittent every 8 hours  ceFAZolin   IVPB      chlorhexidine 4% Liquid 1 Application(s) Topical <User Schedule>  hydrochlorothiazide 12.5 milliGRAM(s) Oral daily  NIFEdipine XL 30 milliGRAM(s) Oral daily  pantoprazole    Tablet 40 milliGRAM(s) Oral before breakfast  rivaroxaban 20 milliGRAM(s) Oral with dinner  vancomycin    Solution 125 milliGRAM(s) Oral every 6 hours    MEDICATIONS  (PRN):  oxycodone    5 mG/acetaminophen 325 mG 1 Tablet(s) Oral every 8 hours PRN Severe Pain (7 - 10)

## 2019-12-13 LAB
GLUCOSE BLDC GLUCOMTR-MCNC: 102 MG/DL — HIGH (ref 70–99)
GLUCOSE BLDC GLUCOMTR-MCNC: 107 MG/DL — HIGH (ref 70–99)
GLUCOSE BLDC GLUCOMTR-MCNC: 107 MG/DL — HIGH (ref 70–99)
GLUCOSE BLDC GLUCOMTR-MCNC: 542 MG/DL — CRITICAL HIGH (ref 70–99)
GLUCOSE BLDC GLUCOMTR-MCNC: 99 MG/DL — SIGNIFICANT CHANGE UP (ref 70–99)

## 2019-12-13 PROCEDURE — 99233 SBSQ HOSP IP/OBS HIGH 50: CPT

## 2019-12-13 RX ORDER — NYSTATIN CREAM 100000 [USP'U]/G
1 CREAM TOPICAL
Refills: 0 | Status: DISCONTINUED | OUTPATIENT
Start: 2019-12-13 | End: 2019-12-23

## 2019-12-13 RX ORDER — VANCOMYCIN HCL 1 G
250 VIAL (EA) INTRAVENOUS EVERY 6 HOURS
Refills: 0 | Status: DISCONTINUED | OUTPATIENT
Start: 2019-12-13 | End: 2019-12-23

## 2019-12-13 RX ORDER — LACTOBACILLUS ACIDOPHILUS 100MM CELL
1 CAPSULE ORAL
Refills: 0 | Status: DISCONTINUED | OUTPATIENT
Start: 2019-12-13 | End: 2019-12-23

## 2019-12-13 RX ADMIN — RIVAROXABAN 20 MILLIGRAM(S): KIT at 17:11

## 2019-12-13 RX ADMIN — Medication 100 MILLIGRAM(S): at 21:19

## 2019-12-13 RX ADMIN — Medication 250 MILLIGRAM(S): at 17:11

## 2019-12-13 RX ADMIN — Medication 1 TABLET(S): at 17:10

## 2019-12-13 RX ADMIN — Medication 100 MILLIGRAM(S): at 06:41

## 2019-12-13 RX ADMIN — Medication 100 MILLIGRAM(S): at 14:30

## 2019-12-13 RX ADMIN — Medication 12.5 MILLIGRAM(S): at 06:41

## 2019-12-13 RX ADMIN — NYSTATIN CREAM 1 APPLICATION(S): 100000 CREAM TOPICAL at 06:41

## 2019-12-13 RX ADMIN — Medication 125 MILLIGRAM(S): at 11:13

## 2019-12-13 RX ADMIN — Medication 250 MILLIGRAM(S): at 23:32

## 2019-12-13 RX ADMIN — Medication 30 MILLIGRAM(S): at 06:41

## 2019-12-13 RX ADMIN — Medication 125 MILLIGRAM(S): at 06:41

## 2019-12-13 RX ADMIN — NYSTATIN CREAM 1 APPLICATION(S): 100000 CREAM TOPICAL at 17:11

## 2019-12-13 NOTE — PROGRESS NOTE ADULT - ASSESSMENT
68 yo F with above PMHx presented with complaint of inability to care for herself and inability to ambulate s/p recent discharge from [facility].    1. Deconditioning, likely due to ongoing C.Diff infection: C diff positive this admission, Change vancomycin to 250mg q6 x 14 days until Dec 22. Can be discharged once diarrhea resolves.  2. Normocytic anemia: Monitor  3. HTN: Nifedipine, HCTZ  4. Chronic Afib: Continue xarelto, rate controlled off meds  5. GERD: d/c protonix in view of c.diff  6. Abdominal Wound likely cellulitis s/p recent excision of sinus tract/abscess: Nursing performinmg daily wound dressing/packing; Continue IV cefazolin for now. When discharged, pt can be switched to augmentin 500 BID through 12/20/2019.  Start acidophilus     GI/DVT PPX    #Progress Note Handoff  Pending (specify): Resolution of diarrhea  Family discussion: d/w pt and her daughter regarding c.diff infection and d/c planning on 12/11  Disposition: NH when medically stable - discussed with SHIRLEY

## 2019-12-13 NOTE — PROGRESS NOTE ADULT - SUBJECTIVE AND OBJECTIVE BOX
PATRICIA SPRAGUE  69y, Female  Allergy: No Known Allergies      Patient seen and examined earlier today. No acute events overnight. Still having diarrhea - four episodes so far and 8 episodes yesterday.  Discussed plan of care with pt's daughter, Stacy [phone] on 12/11.  D/C to snf once diarrhea resolves.    PMH/PSH:  PAST MEDICAL & SURGICAL HISTORY:  Gastroesophageal reflux disease  Obesity  Diabetes mellitus  HTN (hypertension)  History of cholecystectomy  H/O hernia repair: 2011 and revised in 2013    VITALS:  Vital Signs Last 24 Hrs  T(C): 36.1 (13 Dec 2019 06:42), Max: 36.1 (13 Dec 2019 06:42)  T(F): 96.9 (13 Dec 2019 06:42), Max: 96.9 (13 Dec 2019 06:42)  HR: 65 (13 Dec 2019 06:42) (58 - 65)  BP: 164/80 (13 Dec 2019 06:42) (164/80 - 178/83)  BP(mean): --  RR: 16 (13 Dec 2019 06:42) (16 - 16)  SpO2: --      REVIEW OF SYSTEMS:  All other review of systems is negative unless indicated above.     PHYSICAL EXAM:  GENERAL: NAD, well-groomed, well-developed  HEAD:  Atraumatic, Normocephalic  EYES: EOMI, PERRLA, conjunctiva and sclera clear  NERVOUS SYSTEM:  Alert & Oriented X 4, Good concentration; Motor Strength 5/5 B/L upper and lower extremities; DTRs 2+ intact and symmetric  CHEST/LUNG: Clear to percussion bilaterally; No rales, rhonchi, wheezing, or rubs  HEART: Regular rate and rhythm; No murmurs, rubs, or gallops  ABDOMEN: Soft, Nontender, Nondistended; Bowel sounds present; obese  EXTREMITIES:  2+ Peripheral Pulses, No clubbing, cyanosis, or edema  SKIN: No rashes or lesions      LABS:                                     13.6   10.10 )-----------( 201      ( 11 Dec 2019 08:42 )             42.5     12-11    138  |  102  |  14  ----------------------------<  112<H>  3.6   |  23  |  0.6<L>    Ca    10.7<H>      11 Dec 2019 08:42    TPro  6.8  /  Alb  4.1  /  TBili  1.3<H>  /  DBili  x   /  AST  27  /  ALT  19  /  AlkPhos  125<H>  12-11          MEDICATIONS:  MEDICATIONS  (STANDING):  ceFAZolin   IVPB 1000 milliGRAM(s) IV Intermittent every 8 hours  ceFAZolin   IVPB      chlorhexidine 4% Liquid 1 Application(s) Topical <User Schedule>  hydrochlorothiazide 12.5 milliGRAM(s) Oral daily  NIFEdipine XL 30 milliGRAM(s) Oral daily  nystatin Powder 1 Application(s) Topical two times a day  rivaroxaban 20 milliGRAM(s) Oral with dinner  vancomycin    Solution 125 milliGRAM(s) Oral every 6 hours    MEDICATIONS  (PRN):  oxycodone    5 mG/acetaminophen 325 mG 1 Tablet(s) Oral every 8 hours PRN Severe Pain (7 - 10)

## 2019-12-14 LAB
GLUCOSE BLDC GLUCOMTR-MCNC: 101 MG/DL — HIGH (ref 70–99)
GLUCOSE BLDC GLUCOMTR-MCNC: 107 MG/DL — HIGH (ref 70–99)
GLUCOSE BLDC GLUCOMTR-MCNC: 116 MG/DL — HIGH (ref 70–99)
GLUCOSE BLDC GLUCOMTR-MCNC: 133 MG/DL — HIGH (ref 70–99)

## 2019-12-14 PROCEDURE — 99233 SBSQ HOSP IP/OBS HIGH 50: CPT

## 2019-12-14 RX ADMIN — Medication 100 MILLIGRAM(S): at 05:53

## 2019-12-14 RX ADMIN — Medication 250 MILLIGRAM(S): at 23:42

## 2019-12-14 RX ADMIN — Medication 1 TABLET(S): at 11:57

## 2019-12-14 RX ADMIN — Medication 250 MILLIGRAM(S): at 11:59

## 2019-12-14 RX ADMIN — Medication 1 TABLET(S): at 06:14

## 2019-12-14 RX ADMIN — Medication 30 MILLIGRAM(S): at 05:53

## 2019-12-14 RX ADMIN — RIVAROXABAN 20 MILLIGRAM(S): KIT at 17:36

## 2019-12-14 RX ADMIN — Medication 12.5 MILLIGRAM(S): at 05:53

## 2019-12-14 RX ADMIN — Medication 1 TABLET(S): at 17:36

## 2019-12-14 RX ADMIN — Medication 250 MILLIGRAM(S): at 17:40

## 2019-12-14 RX ADMIN — Medication 100 MILLIGRAM(S): at 21:23

## 2019-12-14 RX ADMIN — NYSTATIN CREAM 1 APPLICATION(S): 100000 CREAM TOPICAL at 17:39

## 2019-12-14 RX ADMIN — Medication 250 MILLIGRAM(S): at 05:54

## 2019-12-14 RX ADMIN — NYSTATIN CREAM 1 APPLICATION(S): 100000 CREAM TOPICAL at 05:52

## 2019-12-14 NOTE — PROGRESS NOTE ADULT - ASSESSMENT
70 yo F with above PMHx presented with complaint of inability to care for herself and inability to ambulate s/p recent discharge from [facility].    1. Deconditioning, likely due to ongoing C.Diff infection: C diff positive this admission, Changed vancomycin to 250mg q6 x 14 days until Dec 22. Can be discharged once diarrhea resolves.  2. Normocytic anemia: Monitor  3. HTN: Nifedipine, HCTZ  4. Chronic Afib: Continue xarelto, rate controlled off meds  5. GERD: d/c protonix in view of c.diff  6. Abdominal Wound likely cellulitis s/p recent excision of sinus tract/abscess: Nursing performinmg daily wound dressing/packing; Continue IV cefazolin for now. When discharged, pt can be switched to augmentin 500 BID through 12/20/2019.  Start acidophilus     GI/DVT PPX    #Progress Note Handoff  Pending (specify): Resolution of diarrhea  Family discussion: d/w pt and her daughter on 12/11 regarding c.diff infection and d/c planning.  Discussed with pt daily  Disposition: NH when medically stable - discussed with SHIRLEY

## 2019-12-14 NOTE — PROGRESS NOTE ADULT - SUBJECTIVE AND OBJECTIVE BOX
PATRICIA SPRAGUE  69y, Female  Allergy: No Known Allergies      Patient seen and examined earlier today. No acute events overnight. Still having diarrhea - two episodes so far and 4 episodes yesterday.  Discussed plan of care with pt's daughter, Stacy [phone] on 12/11.  D/C to snf once diarrhea resolves.  Patient is aware    PMH/PSH:  PAST MEDICAL & SURGICAL HISTORY:  Gastroesophageal reflux disease  Obesity  Diabetes mellitus  HTN (hypertension)  History of cholecystectomy  H/O hernia repair: 2011 and revised in 2013    VITALS:  Vital Signs Last 24 Hrs  T(C): 36.4 (14 Dec 2019 05:00), Max: 36.5 (13 Dec 2019 21:28)  T(F): 97.5 (14 Dec 2019 05:00), Max: 97.7 (13 Dec 2019 21:28)  HR: 71 (14 Dec 2019 05:00) (66 - 72)  BP: 132/62 (14 Dec 2019 05:00) (130/68 - 160/76)  BP(mean): --  RR: 16 (14 Dec 2019 05:00) (16 - 17)  SpO2: --      REVIEW OF SYSTEMS:  All other review of systems is negative unless indicated above.     PHYSICAL EXAM:  GENERAL: NAD, well-groomed, well-developed  HEAD:  Atraumatic, Normocephalic  EYES: EOMI, PERRLA, conjunctiva and sclera clear  NERVOUS SYSTEM:  Alert & Oriented X 4, Good concentration; Motor Strength 5/5 B/L upper and lower extremities; DTRs 2+ intact and symmetric  CHEST/LUNG: Clear to percussion bilaterally; No rales, rhonchi, wheezing, or rubs  HEART: Regular rate and rhythm; No murmurs, rubs, or gallops  ABDOMEN: Soft, Nontender, Nondistended; Bowel sounds present; obese  EXTREMITIES:  2+ Peripheral Pulses, No clubbing, cyanosis, or edema  SKIN: No rashes or lesions      LABS:                                     13.6   10.10 )-----------( 201      ( 11 Dec 2019 08:42 )             42.5     12-11    138  |  102  |  14  ----------------------------<  112<H>  3.6   |  23  |  0.6<L>    Ca    10.7<H>      11 Dec 2019 08:42    TPro  6.8  /  Alb  4.1  /  TBili  1.3<H>  /  DBili  x   /  AST  27  /  ALT  19  /  AlkPhos  125<H>  12-11          MEDICATIONS:  MEDICATIONS  (STANDING):  ceFAZolin   IVPB 1000 milliGRAM(s) IV Intermittent every 8 hours  ceFAZolin   IVPB      chlorhexidine 4% Liquid 1 Application(s) Topical <User Schedule>  hydrochlorothiazide 12.5 milliGRAM(s) Oral daily  lactobacillus acidophilus 1 Tablet(s) Oral three times a day with meals  NIFEdipine XL 30 milliGRAM(s) Oral daily  nystatin Powder 1 Application(s) Topical two times a day  rivaroxaban 20 milliGRAM(s) Oral with dinner  vancomycin    Solution 250 milliGRAM(s) Oral every 6 hours    MEDICATIONS  (PRN):  oxycodone    5 mG/acetaminophen 325 mG 1 Tablet(s) Oral every 8 hours PRN Severe Pain (7 - 10)

## 2019-12-15 LAB — GLUCOSE BLDC GLUCOMTR-MCNC: 97 MG/DL — SIGNIFICANT CHANGE UP (ref 70–99)

## 2019-12-15 PROCEDURE — 99233 SBSQ HOSP IP/OBS HIGH 50: CPT

## 2019-12-15 RX ADMIN — Medication 1 TABLET(S): at 11:09

## 2019-12-15 RX ADMIN — Medication 100 MILLIGRAM(S): at 21:30

## 2019-12-15 RX ADMIN — NYSTATIN CREAM 1 APPLICATION(S): 100000 CREAM TOPICAL at 17:21

## 2019-12-15 RX ADMIN — Medication 1 TABLET(S): at 17:20

## 2019-12-15 RX ADMIN — Medication 250 MILLIGRAM(S): at 11:09

## 2019-12-15 RX ADMIN — NYSTATIN CREAM 1 APPLICATION(S): 100000 CREAM TOPICAL at 05:31

## 2019-12-15 RX ADMIN — RIVAROXABAN 20 MILLIGRAM(S): KIT at 17:20

## 2019-12-15 RX ADMIN — Medication 250 MILLIGRAM(S): at 05:31

## 2019-12-15 RX ADMIN — Medication 100 MILLIGRAM(S): at 05:31

## 2019-12-15 RX ADMIN — Medication 12.5 MILLIGRAM(S): at 05:31

## 2019-12-15 RX ADMIN — Medication 250 MILLIGRAM(S): at 17:21

## 2019-12-15 RX ADMIN — CHLORHEXIDINE GLUCONATE 1 APPLICATION(S): 213 SOLUTION TOPICAL at 05:30

## 2019-12-15 RX ADMIN — Medication 30 MILLIGRAM(S): at 05:31

## 2019-12-15 RX ADMIN — Medication 1 TABLET(S): at 07:35

## 2019-12-15 RX ADMIN — Medication 100 MILLIGRAM(S): at 14:23

## 2019-12-15 NOTE — PROGRESS NOTE ADULT - SUBJECTIVE AND OBJECTIVE BOX
DIAGNOSIS:   HOSPITAL DAY #:    STATUS POST:    POST OPERATIVE DAY #:     Vital Signs Last 24 Hrs  T(C): 35.8 (15 Dec 2019 14:12), Max: 36.3 (15 Dec 2019 05:00)  T(F): 96.4 (15 Dec 2019 14:12), Max: 97.3 (15 Dec 2019 05:00)  HR: 66 (15 Dec 2019 14:12) (62 - 66)  BP: 150/78 (15 Dec 2019 14:12) (135/70 - 150/78)  BP(mean): --  RR: 18 (15 Dec 2019 05:00) (18 - 18)  SpO2: --    SUBJECTIVE: Pt seen    Pain: YES  [ ]   NO [ ]   Nausea: [ ] YES [ ] NO           Vomiting: [ ] YES [ ] NO  Flatus: [ ] YES [ ] NO             Bowel Movement: [ ] YES [ ] NO     Void: [ ]YES [ ]No      BULMARO DRAINAGE: SIGNIFICANT [ ]   NOT SIGNIFICANT [ ]   NOT APPLICABLE [ ]  YES [ ] NO    General Appearance: Appears well, NAD  Neck: Supple  Chest: Equal expansion bilaterally, equal breath sounds  CV: Pulse regular presently  Abdomen: Soft [x ] YES [ ]NO  DISTENDED [ ] YES [x ] NO TENDERNESS [ ]YES [x ]NO  INCISIONS: HEALING WELL [x ] YES  [ ] NO ERYTHEMA [ ] YES [ ] NO DRAINAGE [ ] YES  [ ] NO  Extremities: Grossly symmetric, CALF TENDERNESS [ ] YES  [ ] NO      LABS:                  ASSESSMENT:     GOOD POST OP COURSE [ ]  YES  [ ] NO  CONDITION IMPROVING  []  YES  [ ]  NO          PLAN:    CONTINUE PRESENT MANAGEMENT  [ ] YES  [ ] NO

## 2019-12-15 NOTE — PROGRESS NOTE ADULT - SUBJECTIVE AND OBJECTIVE BOX
PATRICIA SPRAGUE  69y, Female  Allergy: No Known Allergies      Patient seen and examined earlier today. No acute events overnight. Still having diarrhea - three episodes so far and 8 episodes yesterday.  Discussed plan of care with pt's daughter, Stacy [phone] on 12/11.  D/C to snf once diarrhea resolves.  Patient is aware of this and asks to remain in the hospital for at least three days with formed bowel movements before d/c to SNF    PMH/PSH:  PAST MEDICAL & SURGICAL HISTORY:  Gastroesophageal reflux disease  Obesity  Diabetes mellitus  HTN (hypertension)  History of cholecystectomy  H/O hernia repair: 2011 and revised in 2013    VITALS:  Vital Signs Last 24 Hrs  T(C): 36.3 (15 Dec 2019 05:00), Max: 36.3 (15 Dec 2019 05:00)  T(F): 97.3 (15 Dec 2019 05:00), Max: 97.3 (15 Dec 2019 05:00)  HR: 62 (15 Dec 2019 05:00) (61 - 67)  BP: 135/70 (15 Dec 2019 05:00) (125/75 - 135/70)  BP(mean): --  RR: 18 (15 Dec 2019 05:00) (16 - 18)  SpO2: --      REVIEW OF SYSTEMS:  All other review of systems is negative unless indicated above.     PHYSICAL EXAM:  GENERAL: NAD, well-groomed, well-developed  HEAD:  Atraumatic, Normocephalic  EYES: EOMI, PERRLA, conjunctiva and sclera clear  NERVOUS SYSTEM:  Alert & Oriented X 4, Good concentration; Motor Strength 5/5 B/L upper and lower extremities; DTRs 2+ intact and symmetric  CHEST/LUNG: Clear to percussion bilaterally; No rales, rhonchi, wheezing, or rubs  HEART: Regular rate and rhythm; No murmurs, rubs, or gallops  ABDOMEN: Soft, Nontender, Nondistended; Bowel sounds present; obese  EXTREMITIES:  2+ Peripheral Pulses, No clubbing, cyanosis, or edema  SKIN: No rashes or lesions      LABS:                                 13.6   10.10 )-----------( 201      ( 11 Dec 2019 08:42 )             42.5     12-11    138  |  102  |  14  ----------------------------<  112<H>  3.6   |  23  |  0.6<L>    Ca    10.7<H>      11 Dec 2019 08:42    TPro  6.8  /  Alb  4.1  /  TBili  1.3<H>  /  DBili  x   /  AST  27  /  ALT  19  /  AlkPhos  125<H>  12-11        MEDICATIONS:  MEDICATIONS  (STANDING):  ceFAZolin   IVPB 1000 milliGRAM(s) IV Intermittent every 8 hours  ceFAZolin   IVPB      chlorhexidine 4% Liquid 1 Application(s) Topical <User Schedule>  hydrochlorothiazide 12.5 milliGRAM(s) Oral daily  lactobacillus acidophilus 1 Tablet(s) Oral three times a day with meals  NIFEdipine XL 30 milliGRAM(s) Oral daily  nystatin Powder 1 Application(s) Topical two times a day  rivaroxaban 20 milliGRAM(s) Oral with dinner  vancomycin    Solution 250 milliGRAM(s) Oral every 6 hours

## 2019-12-15 NOTE — PROGRESS NOTE ADULT - ASSESSMENT
70 yo F with above PMHx presented with complaint of inability to care for herself and inability to ambulate s/p recent discharge from [facility].    1. Deconditioning, likely due to ongoing C.Diff infection: C diff positive this admission, Changed vancomycin to 250mg q6 x 14 days until Dec 22. Can be discharged once diarrhea resolves.  2. Normocytic anemia: Monitor  3. HTN: Nifedipine, HCTZ  4. Chronic Afib: Continue xarelto, rate controlled off meds  5. GERD: d/c protonix in view of c.diff  6. Abdominal Wound likely cellulitis s/p recent excision of sinus tract/abscess: Nursing performing daily wound dressing/packing; Continue IV cefazolin for now. When discharged, pt can be switched to augmentin 500 BID through 12/20/2019.  Started acidophilus     GI/DVT PPX    #Progress Note Handoff  Pending (specify): Resolution of diarrhea  Family discussion: d/w pt and her daughter on 12/11 regarding c.diff infection and d/c planning.  Discussed d/c planning with pt daily.  Not ready for tomorrow.  Not anticipated for Monday.   Disposition: NH when medically stable - discussed with SHIRLEY

## 2019-12-16 LAB
ALBUMIN SERPL ELPH-MCNC: 3.8 G/DL — SIGNIFICANT CHANGE UP (ref 3.5–5.2)
ALP SERPL-CCNC: 159 U/L — HIGH (ref 30–115)
ALT FLD-CCNC: 54 U/L — HIGH (ref 0–41)
ANION GAP SERPL CALC-SCNC: 12 MMOL/L — SIGNIFICANT CHANGE UP (ref 7–14)
AST SERPL-CCNC: 53 U/L — HIGH (ref 0–41)
BILIRUB SERPL-MCNC: 1.1 MG/DL — SIGNIFICANT CHANGE UP (ref 0.2–1.2)
BUN SERPL-MCNC: 20 MG/DL — SIGNIFICANT CHANGE UP (ref 10–20)
CALCIUM SERPL-MCNC: 10.4 MG/DL — HIGH (ref 8.5–10.1)
CHLORIDE SERPL-SCNC: 103 MMOL/L — SIGNIFICANT CHANGE UP (ref 98–110)
CO2 SERPL-SCNC: 24 MMOL/L — SIGNIFICANT CHANGE UP (ref 17–32)
CREAT SERPL-MCNC: 0.6 MG/DL — LOW (ref 0.7–1.5)
GLUCOSE SERPL-MCNC: 105 MG/DL — HIGH (ref 70–99)
HCT VFR BLD CALC: 41 % — SIGNIFICANT CHANGE UP (ref 37–47)
HGB BLD-MCNC: 13.4 G/DL — SIGNIFICANT CHANGE UP (ref 12–16)
MCHC RBC-ENTMCNC: 27.5 PG — SIGNIFICANT CHANGE UP (ref 27–31)
MCHC RBC-ENTMCNC: 32.7 G/DL — SIGNIFICANT CHANGE UP (ref 32–37)
MCV RBC AUTO: 84 FL — SIGNIFICANT CHANGE UP (ref 81–99)
NRBC # BLD: 0 /100 WBCS — SIGNIFICANT CHANGE UP (ref 0–0)
PLATELET # BLD AUTO: 226 K/UL — SIGNIFICANT CHANGE UP (ref 130–400)
POTASSIUM SERPL-MCNC: 2.9 MMOL/L — LOW (ref 3.5–5)
POTASSIUM SERPL-SCNC: 2.9 MMOL/L — LOW (ref 3.5–5)
PROT SERPL-MCNC: 6.4 G/DL — SIGNIFICANT CHANGE UP (ref 6–8)
RBC # BLD: 4.88 M/UL — SIGNIFICANT CHANGE UP (ref 4.2–5.4)
RBC # FLD: 13.8 % — SIGNIFICANT CHANGE UP (ref 11.5–14.5)
SODIUM SERPL-SCNC: 139 MMOL/L — SIGNIFICANT CHANGE UP (ref 135–146)
WBC # BLD: 12 K/UL — HIGH (ref 4.8–10.8)
WBC # FLD AUTO: 12 K/UL — HIGH (ref 4.8–10.8)

## 2019-12-16 PROCEDURE — 99233 SBSQ HOSP IP/OBS HIGH 50: CPT

## 2019-12-16 RX ADMIN — Medication 100 MILLIGRAM(S): at 05:04

## 2019-12-16 RX ADMIN — Medication 100 MILLIGRAM(S): at 15:24

## 2019-12-16 RX ADMIN — Medication 250 MILLIGRAM(S): at 05:11

## 2019-12-16 RX ADMIN — Medication 1 TABLET(S): at 08:46

## 2019-12-16 RX ADMIN — Medication 12.5 MILLIGRAM(S): at 05:06

## 2019-12-16 RX ADMIN — Medication 1 TABLET(S): at 12:32

## 2019-12-16 RX ADMIN — NYSTATIN CREAM 1 APPLICATION(S): 100000 CREAM TOPICAL at 18:19

## 2019-12-16 RX ADMIN — Medication 250 MILLIGRAM(S): at 00:10

## 2019-12-16 RX ADMIN — CHLORHEXIDINE GLUCONATE 1 APPLICATION(S): 213 SOLUTION TOPICAL at 05:04

## 2019-12-16 RX ADMIN — Medication 250 MILLIGRAM(S): at 18:18

## 2019-12-16 RX ADMIN — Medication 1 TABLET(S): at 18:18

## 2019-12-16 RX ADMIN — Medication 30 MILLIGRAM(S): at 05:06

## 2019-12-16 RX ADMIN — Medication 250 MILLIGRAM(S): at 12:32

## 2019-12-16 RX ADMIN — NYSTATIN CREAM 1 APPLICATION(S): 100000 CREAM TOPICAL at 05:08

## 2019-12-16 RX ADMIN — RIVAROXABAN 20 MILLIGRAM(S): KIT at 18:18

## 2019-12-16 RX ADMIN — Medication 100 MILLIGRAM(S): at 22:19

## 2019-12-16 NOTE — CHART NOTE - NSCHARTNOTEFT_GEN_A_CORE
Registered Dietitian Follow-Up     Patient Profile Reviewed                           Yes [x]   No []     Nutrition History Previously Obtained        Yes [x]  No []       Pertinent Subjective Information: Pt reports that she has been eating just ok and that her appetite is so/so. Pt reports that she only ate a banana for breakfast because she continues to have a lot of diarrhea and can not eat d/t the smell ruining her appetite. Pt requests that her door be left open and wants the room to be sprayed. This was all taken care of by nursing staff. No nausea/abdominal pain with meals. No chewing/swallowing difficulties.     Pertinent Medical Interventions: NPO MN for abdominal wound debridement today (). D/c planned to SNF when diarrhea resolves.      Diet order: soft, consistent CHO no snacks; NPO MN     Anthropometrics:  - Ht. 66"  - Wt. 101.4 kg/223 lbs (no new wt since previous assessment)  - %wt change NA  - BMI 36.1   - IBW 59 kg/130 lbs     Pertinent Lab Data: () K 2.9, Cr 0.6, glucose 105, Ca 10.4, alk phos 159, AST 53, ALT 54; (12/15) POC glucose 97     Pertinent Meds: vancomycin, ancef, lactobacillus acidophilus, hydrochlorothiazide     Physical Findings:  - Appearance: obese; no edema noted  - GI function: diarrhea/c dff 2 BMs this am so far at time of assessment  - Tubes: NA  - Oral/Mouth cavity: no reported chewing/swallowing difficulties   - Skin: R abdominal wound     Nutrition Requirements  Weight Used: Dosin.4 kg/223 lbs; IBW: 59 kg/130 lbs (needs continued from initial assessment on )     Estimated Energy Needs    Continue [x]  4493-4262 (MSJ x 1-1.1 AF) Obese BMI considered        Estimated Protein Needs    Continue [x] 59-71 g (1-1.2 g/kg IBW) same as above        Estimated Fluid Needs        Continue [x] 1mL/kcal or per LIP     Nutrient Intake: 50-75% (fair)        [x] Previous Nutrition Diagnosis: Inadequate oral intake            [x] Ongoing               Nutrition Intervention meals and snacks, coordination of care     Goal/Expected Outcome: pt to maintain po intake >75% over the next 3 days     Indicator/Monitoring: RD to monitor diet order, energy intake, body composition, NFPF    Recommendation: continue current diet order, continue with probiotic, encourage po intake, provide assistance with meals PRN Registered Dietitian Follow-Up     Patient Profile Reviewed                           Yes [x]   No []     Nutrition History Previously Obtained        Yes [x]  No []       Pertinent Subjective Information: Pt reports that she has been eating just ok and that her appetite is so/so. Pt reports that she only ate a banana for breakfast because she continues to have a lot of diarrhea and can not eat d/t the smell ruining her appetite. Pt requests that her door be left open and wants the room to be sprayed. This was all taken care of by nursing staff. No nausea/abdominal pain with meals. No chewing/swallowing difficulties.     Pertinent Medical Interventions: NPO MN for abdominal wound debridement today (). D/c planned to SNF when diarrhea resolves.      Diet order: soft, consistent CHO no snacks; NPO MN     Anthropometrics:  - Ht. 66"  - Wt. 101.4 kg/223 lbs (no new wt since previous assessment)  - %wt change NA  - BMI 36.1   - IBW 59 kg/130 lbs     Pertinent Lab Data: () K 2.9, Cr 0.6, glucose 105, Ca 10.4, alk phos 159, AST 53, ALT 54; (12/15) POC glucose 97     Pertinent Meds: vancomycin, ancef, lactobacillus acidophilus, hydrochlorothiazide     Physical Findings:  - Appearance: obese; no edema noted  - GI function: diarrhea/c dff 2 BMs this am so far at time of assessment  - Tubes: NA  - Oral/Mouth cavity: no reported chewing/swallowing difficulties   - Skin: R abdominal wound     Nutrition Requirements  Weight Used: Dosin.4 kg/223 lbs; IBW: 59 kg/130 lbs (needs continued from initial assessment on )     Estimated Energy Needs    Continue [x]  1051-1653 (MSJ x 1-1.1 AF) Obese BMI considered        Estimated Protein Needs    Continue [x] 59-71 g (1-1.2 g/kg IBW) same as above        Estimated Fluid Needs        Continue [x] 1mL/kcal or per LIP     Nutrient Intake: 50-75% (fair)        [x] Previous Nutrition Diagnosis: Inadequate oral intake            [x] Ongoing               Nutrition Intervention meals and snacks, coordination of care     Goal/Expected Outcome: pt to maintain po intake >75% over the next 3 days     Indicator/Monitoring: RD to monitor diet order, energy intake, body composition, NFPF, electrolyte profile    Recommendation: continue current diet order, continue with probiotic, encourage po intake, provide assistance with meals PRN, replete lytes PRN

## 2019-12-16 NOTE — CHART NOTE - NSCHARTNOTEFT_GEN_A_CORE
DR. VIEIRA   WOULD LIKE THE PT TO BE NPO P BREAKFAST     HOLD AM XARELTO     POSS OR TODAY FOR DEBRIDEMENT OF ABDOMINAL WOUNDS       D/W DR. WALSH WHO APPROVES ABOVE RECC

## 2019-12-16 NOTE — PROGRESS NOTE ADULT - ASSESSMENT
70 yo F with above PMHx presented with complaint of inability to care for herself and inability to ambulate s/p recent discharge from [facility].    1. C. Diff colitis  -C/w vancomycin 250mg q6 x 14 days until Dec 22.   -Monitor vitals and labs    2. Normocytic anemia  - Stable, monitor H/h    3. HTN controlled   -C/w Nifedipine, HCTZ with holding parameters SBP <100 and DPB <60  -Monitor BP    4. Chronic non valvular a fib  - rate controlled   - Hold xarelto for abdominal wound debridement to restart after procedure in 24 hrs if ok with sugery    5. GERD:   -off protonix due to c diff    6. Abdominal Wound likely cellulitis  - For repeat debridement this morning  -daily wound dressing/packing  -Continue IV cefazolin and change to Augmentin 500 BID through 12/20/2019.    - C/w acidophilus     GI/DVT PPX    #Progress Note Handoff  Pending (specify): Resolution of diarrhea  Family discussion: POC d/w patient.   Disposition: NH when medically stable - discussed with SHIRLEY

## 2019-12-16 NOTE — PROGRESS NOTE ADULT - SUBJECTIVE AND OBJECTIVE BOX
DIAGNOSIS:   HOSPITAL DAY #:    STATUS POST:    POST OPERATIVE DAY #:     Vital Signs Last 24 Hrs  T(C): 36.9 (16 Dec 2019 14:43), Max: 36.9 (16 Dec 2019 14:43)  T(F): 98.4 (16 Dec 2019 14:43), Max: 98.4 (16 Dec 2019 14:43)  HR: 71 (16 Dec 2019 14:43) (63 - 71)  BP: 158/94 (16 Dec 2019 14:43) (141/72 - 158/94)  BP(mean): --  RR: 18 (16 Dec 2019 14:43) (18 - 18)  SpO2: --    SUBJECTIVE: Pt seen    Pain: YES  [ ]   NO [ ]   Nausea: [ ] YES [ ] NO           Vomiting: [ ] YES [ ] NO  Flatus: [ ] YES [ ] NO             Bowel Movement: [ ] YES [ ] NO     Void: [ ]YES [ ]No      BULMARO DRAINAGE: SIGNIFICANT [ ]   NOT SIGNIFICANT [ ]   NOT APPLICABLE [ ]  YES [ ] NO    General Appearance: Appears well, NAD  Neck: Supple  Chest: Equal expansion bilaterally, equal breath sounds  CV: Pulse regular presently  Abdomen: Soft [x ] YES [ ]NO  DISTENDED [ ] YES [x ] NO TENDERNESS [ ]YES [ ]NO  INCISIONS: HEALING WELL [ ] YES  [x ] NO ERYTHEMA [ ] YES [ ] NO DRAINAGE [ ] YES  [ ] NO  Extremities: Grossly symmetric, CALF TENDERNESS [ ] YES  [ ] NO      LABS:                        13.4   12.00 )-----------( 226      ( 16 Dec 2019 09:21 )             41.0     12-16    139  |  103  |  20  ----------------------------<  105<H>  2.9<L>   |  24  |  0.6<L>    Ca    10.4<H>      16 Dec 2019 09:21    TPro  6.4  /  Alb  3.8  /  TBili  1.1  /  DBili  x   /  AST  53<H>  /  ALT  54<H>  /  AlkPhos  159<H>  12-16            ASSESSMENT:     GOOD POST OP COURSE [ ]  YES  [ ] NO  CONDITION IMPROVING  []  YES  [ ]  NO          PLAN: discussed case with pt and family regarding debridment    CONTINUE PRESENT MANAGEMENT  [ ] YES  [ ] NO

## 2019-12-16 NOTE — PROGRESS NOTE ADULT - SUBJECTIVE AND OBJECTIVE BOX
PATRICIA SPRAGUE  69y, Female  Allergy: No Known Allergies      Patient seen and examined earlier today.   Had bouts of watery foul smelling stools overnight  No fevers or chills  For abdominal wound debridement today  A/C on hold for debridment    PMH/PSH:  PAST MEDICAL & SURGICAL HISTORY:  Gastroesophageal reflux disease  Obesity  Diabetes mellitus  HTN (hypertension)  History of cholecystectomy  H/O hernia repair: 2011 and revised in 2013    VITALS:    Vital Signs Last 24 Hrs  T(C): 35.9 (16 Dec 2019 05:08), Max: 35.9 (16 Dec 2019 05:08)  T(F): 96.6 (16 Dec 2019 05:08), Max: 96.6 (16 Dec 2019 05:08)  HR: 63 (16 Dec 2019 05:08) (63 - 66)  BP: 141/72 (16 Dec 2019 05:08) (141/72 - 150/78)  BP(mean): --  RR: 18 (16 Dec 2019 05:08) (18 - 18)  SpO2: --      REVIEW OF SYSTEMS:  All other review of systems is negative unless indicated above.     PHYSICAL EXAM:  GENERAL: NAD, well-groomed, well-developed  HEAD:  Atraumatic, Normocephalic  EYES: EOMI, PERRLA, conjunctiva and sclera clear  NERVOUS SYSTEM:  Alert & Oriented X 4, Good concentration; Motor Strength 5/5 B/L upper and lower extremities; DTRs 2+ intact and symmetric  CHEST/LUNG: Clear to percussion bilaterally; No rales, rhonchi, wheezing, or rubs  HEART: Regular rate and rhythm; No murmurs, rubs, or gallops  ABDOMEN: Soft, Nontender, Nondistended; Bowel sounds present; obese  EXTREMITIES:  2+ Peripheral Pulses, No clubbing, cyanosis, or edema  SKIN: No rashes or lesions      LABS:                                 13.6   10.10 )-----------( 201      ( 11 Dec 2019 08:42 )             42.5     12-11    138  |  102  |  14  ----------------------------<  112<H>  3.6   |  23  |  0.6<L>    Ca    10.7<H>      11 Dec 2019 08:42    TPro  6.8  /  Alb  4.1  /  TBili  1.3<H>  /  DBili  x   /  AST  27  /  ALT  19  /  AlkPhos  125<H>  12-11        MEDICATIONS:  MEDICATIONS  (STANDING):  ceFAZolin   IVPB 1000 milliGRAM(s) IV Intermittent every 8 hours  ceFAZolin   IVPB      chlorhexidine 4% Liquid 1 Application(s) Topical <User Schedule>  hydrochlorothiazide 12.5 milliGRAM(s) Oral daily  lactobacillus acidophilus 1 Tablet(s) Oral three times a day with meals  NIFEdipine XL 30 milliGRAM(s) Oral daily  nystatin Powder 1 Application(s) Topical two times a day  rivaroxaban 20 milliGRAM(s) Oral with dinner  vancomycin    Solution 250 milliGRAM(s) Oral every 6 hours

## 2019-12-17 LAB
ALBUMIN SERPL ELPH-MCNC: 4 G/DL — SIGNIFICANT CHANGE UP (ref 3.5–5.2)
ALP SERPL-CCNC: 160 U/L — HIGH (ref 30–115)
ALT FLD-CCNC: 56 U/L — HIGH (ref 0–41)
ANION GAP SERPL CALC-SCNC: 12 MMOL/L — SIGNIFICANT CHANGE UP (ref 7–14)
AST SERPL-CCNC: 53 U/L — HIGH (ref 0–41)
BILIRUB SERPL-MCNC: 1.9 MG/DL — HIGH (ref 0.2–1.2)
BUN SERPL-MCNC: 15 MG/DL — SIGNIFICANT CHANGE UP (ref 10–20)
CALCIUM SERPL-MCNC: 10.9 MG/DL — HIGH (ref 8.5–10.1)
CHLORIDE SERPL-SCNC: 103 MMOL/L — SIGNIFICANT CHANGE UP (ref 98–110)
CO2 SERPL-SCNC: 26 MMOL/L — SIGNIFICANT CHANGE UP (ref 17–32)
CREAT SERPL-MCNC: 0.7 MG/DL — SIGNIFICANT CHANGE UP (ref 0.7–1.5)
GLUCOSE SERPL-MCNC: 95 MG/DL — SIGNIFICANT CHANGE UP (ref 70–99)
HCT VFR BLD CALC: 42.9 % — SIGNIFICANT CHANGE UP (ref 37–47)
HGB BLD-MCNC: 14.1 G/DL — SIGNIFICANT CHANGE UP (ref 12–16)
MCHC RBC-ENTMCNC: 27.4 PG — SIGNIFICANT CHANGE UP (ref 27–31)
MCHC RBC-ENTMCNC: 32.9 G/DL — SIGNIFICANT CHANGE UP (ref 32–37)
MCV RBC AUTO: 83.5 FL — SIGNIFICANT CHANGE UP (ref 81–99)
NRBC # BLD: 0 /100 WBCS — SIGNIFICANT CHANGE UP (ref 0–0)
PLATELET # BLD AUTO: 233 K/UL — SIGNIFICANT CHANGE UP (ref 130–400)
POTASSIUM SERPL-MCNC: 3.2 MMOL/L — LOW (ref 3.5–5)
POTASSIUM SERPL-SCNC: 3.2 MMOL/L — LOW (ref 3.5–5)
PROT SERPL-MCNC: 6.6 G/DL — SIGNIFICANT CHANGE UP (ref 6–8)
RBC # BLD: 5.14 M/UL — SIGNIFICANT CHANGE UP (ref 4.2–5.4)
RBC # FLD: 13.7 % — SIGNIFICANT CHANGE UP (ref 11.5–14.5)
SODIUM SERPL-SCNC: 141 MMOL/L — SIGNIFICANT CHANGE UP (ref 135–146)
WBC # BLD: 12.03 K/UL — HIGH (ref 4.8–10.8)
WBC # FLD AUTO: 12.03 K/UL — HIGH (ref 4.8–10.8)

## 2019-12-17 PROCEDURE — 99233 SBSQ HOSP IP/OBS HIGH 50: CPT

## 2019-12-17 RX ORDER — ACETAMINOPHEN 500 MG
650 TABLET ORAL ONCE
Refills: 0 | Status: COMPLETED | OUTPATIENT
Start: 2019-12-17 | End: 2019-12-17

## 2019-12-17 RX ADMIN — Medication 650 MILLIGRAM(S): at 10:56

## 2019-12-17 RX ADMIN — Medication 100 MILLIGRAM(S): at 05:10

## 2019-12-17 RX ADMIN — NYSTATIN CREAM 1 APPLICATION(S): 100000 CREAM TOPICAL at 05:11

## 2019-12-17 RX ADMIN — Medication 250 MILLIGRAM(S): at 17:10

## 2019-12-17 RX ADMIN — Medication 250 MILLIGRAM(S): at 11:41

## 2019-12-17 RX ADMIN — Medication 250 MILLIGRAM(S): at 05:11

## 2019-12-17 RX ADMIN — RIVAROXABAN 20 MILLIGRAM(S): KIT at 17:13

## 2019-12-17 RX ADMIN — Medication 1 TABLET(S): at 08:26

## 2019-12-17 RX ADMIN — Medication 100 MILLIGRAM(S): at 22:18

## 2019-12-17 RX ADMIN — CHLORHEXIDINE GLUCONATE 1 APPLICATION(S): 213 SOLUTION TOPICAL at 05:10

## 2019-12-17 RX ADMIN — Medication 1 TABLET(S): at 17:13

## 2019-12-17 RX ADMIN — Medication 30 MILLIGRAM(S): at 05:11

## 2019-12-17 RX ADMIN — Medication 100 MILLIGRAM(S): at 14:31

## 2019-12-17 RX ADMIN — Medication 1 TABLET(S): at 12:18

## 2019-12-17 RX ADMIN — Medication 250 MILLIGRAM(S): at 23:26

## 2019-12-17 RX ADMIN — Medication 250 MILLIGRAM(S): at 00:07

## 2019-12-17 RX ADMIN — NYSTATIN CREAM 1 APPLICATION(S): 100000 CREAM TOPICAL at 17:13

## 2019-12-17 RX ADMIN — Medication 12.5 MILLIGRAM(S): at 05:10

## 2019-12-17 NOTE — PROGRESS NOTE ADULT - SUBJECTIVE AND OBJECTIVE BOX
PATRICIA SPRAGUE  69y, Female  Allergy: No Known Allergies      Patient seen and examined earlier today. No acute events overnight. Still having diarrhea - two episodes so far this morning.  Pt tolerating food.  OOB to chair recommended daily.     PMH/PSH:  PAST MEDICAL & SURGICAL HISTORY:  Gastroesophageal reflux disease  Obesity  Diabetes mellitus  HTN (hypertension)  History of cholecystectomy  H/O hernia repair: 2011 and revised in 2013    VITALS:  Vital Signs Last 24 Hrs  T(C): 35.9 (17 Dec 2019 05:00), Max: 36.8 (16 Dec 2019 21:29)  T(F): 96.6 (17 Dec 2019 05:00), Max: 98.2 (16 Dec 2019 21:29)  HR: 56 (17 Dec 2019 05:00) (56 - 67)  BP: 151/76 (17 Dec 2019 05:00) (151/76 - 162/90)  BP(mean): --  RR: 18 (17 Dec 2019 05:00) (17 - 18)  SpO2: --      REVIEW OF SYSTEMS:  All other review of systems is negative unless indicated above.     PHYSICAL EXAM:  GENERAL: NAD, well-groomed, well-developed  HEAD:  Atraumatic, Normocephalic  EYES: EOMI, PERRLA, conjunctiva and sclera clear  NERVOUS SYSTEM:  Alert & Oriented X 4, Good concentration; Motor Strength 5/5 B/L upper and lower extremities; DTRs 2+ intact and symmetric  CHEST/LUNG: Clear to percussion bilaterally; No rales, rhonchi, wheezing, or rubs  HEART: Regular rate and rhythm; No murmurs, rubs, or gallops  ABDOMEN: Soft, Nontender, Nondistended; Bowel sounds present; obese  EXTREMITIES:  2+ Peripheral Pulses, No clubbing, cyanosis, or edema  SKIN: No rashes or lesions      LABS:                                            14.1   12.03 )-----------( 233      ( 17 Dec 2019 08:45 )             42.9       12-17    141  |  103  |  15  ----------------------------<  95  3.2<L>   |  26  |  0.7    Ca    10.9<H>      17 Dec 2019 08:45    TPro  6.6  /  Alb  4.0  /  TBili  1.9<H>  /  DBili  x   /  AST  53<H>  /  ALT  56<H>  /  AlkPhos  160<H>  12-17          MEDICATIONS:  MEDICATIONS  (STANDING):  ceFAZolin   IVPB 1000 milliGRAM(s) IV Intermittent every 8 hours  ceFAZolin   IVPB      chlorhexidine 4% Liquid 1 Application(s) Topical <User Schedule>  hydrochlorothiazide 12.5 milliGRAM(s) Oral daily  lactobacillus acidophilus 1 Tablet(s) Oral three times a day with meals  NIFEdipine XL 30 milliGRAM(s) Oral daily  nystatin Powder 1 Application(s) Topical two times a day  rivaroxaban 20 milliGRAM(s) Oral with dinner  vancomycin    Solution 250 milliGRAM(s) Oral every 6 hours    MEDICATIONS  (PRN):

## 2019-12-17 NOTE — PROGRESS NOTE ADULT - SUBJECTIVE AND OBJECTIVE BOX
DIAGNOSIS:   HOSPITAL DAY #:    STATUS POST:    POST OPERATIVE DAY #:     Vital Signs Last 24 Hrs  T(C): 36.4 (17 Dec 2019 20:46), Max: 36.4 (17 Dec 2019 20:46)  T(F): 97.6 (17 Dec 2019 20:46), Max: 97.6 (17 Dec 2019 20:46)  HR: 67 (17 Dec 2019 20:46) (56 - 67)  BP: 176/94 (17 Dec 2019 20:46) (151/76 - 176/94)  BP(mean): --  RR: 18 (17 Dec 2019 20:46) (18 - 18)  SpO2: --    SUBJECTIVE: Pt seen    Pain: YES  [ ]   NO [ ]   Nausea: [ ] YES [ ] NO           Vomiting: [ ] YES [ ] NO  Flatus: [ ] YES [ ] NO             Bowel Movement: [ ] YES [ ] NO     Void: [ ]YES [ ]No      BULMARO DRAINAGE: SIGNIFICANT [ ]   NOT SIGNIFICANT [ ]   NOT APPLICABLE [ ]  YES [ ] NO    General Appearance: Appears well, NAD  Neck: Supple  Chest: Equal expansion bilaterally, equal breath sounds  CV: Pulse regular presently  Abdomen: Soft [x ] YES [ ]NO  DISTENDED [ ] YES [x ] NO TENDERNESS [ ]YES [ ]NO  INCISIONS: HEALING WELL [ ] YES  [x ] NO ERYTHEMA [ ] YES [ ] NO DRAINAGE [ ] YES  [ ] NO  Extremities: Grossly symmetric, CALF TENDERNESS [ ] YES  [ ] NO      LABS:                        14.1   12.03 )-----------( 233      ( 17 Dec 2019 08:45 )             42.9     12-17    141  |  103  |  15  ----------------------------<  95  3.2<L>   |  26  |  0.7    Ca    10.9<H>      17 Dec 2019 08:45    TPro  6.6  /  Alb  4.0  /  TBili  1.9<H>  /  DBili  x   /  AST  53<H>  /  ALT  56<H>  /  AlkPhos  160<H>  12-17            ASSESSMENT:     GOOD POST OP COURSE [ ]  YES  [ ] NO  CONDITION IMPROVING  []  YES  [ ]  NO          PLAN:    CONTINUE PRESENT MANAGEMENT  [ ] YES  [ ] NO

## 2019-12-17 NOTE — PROGRESS NOTE ADULT - ASSESSMENT
68 yo F with above PMHx presented with complaint of inability to care for herself and inability to ambulate s/p recent discharge from [facility].    1. Deconditioning, likely due to ongoing C.Diff infection: C diff positive this admission, Changed vancomycin to 250mg q6 x 14 days until Dec 22. Can be discharged once diarrhea resolves.  2. Normocytic anemia: Monitor  3. HTN: Nifedipine, HCTZ  4. Chronic Afib: Continue xarelto, rate controlled off meds  5. GERD: d/c protonix in view of c.diff  6. Abdominal Wound likely cellulitis s/p recent excision of sinus tract/abscess: Nursing performing daily wound dressing/packing; Continue IV cefazolin for now. When discharged, pt can be switched to augmentin 500 BID through 12/20/2019.  Started acidophilus.  Surgery is following for possible debridement later on this week if patient remains hospitalized     GI/DVT PPX    #Progress Note Handoff  Pending (specify): Resolution of diarrhea  Family discussion: d/w pt her plan of care including vanco until diarrhea improves - pt in agreement.  Will be discharged to SNF.  CM will get another authorization today.   Disposition: NH when medically stable - discussed with CM  Please intent the patient for d/c when diarrhea begins to improve

## 2019-12-18 LAB
ALBUMIN SERPL ELPH-MCNC: 3.7 G/DL — SIGNIFICANT CHANGE UP (ref 3.5–5.2)
ALP SERPL-CCNC: 142 U/L — HIGH (ref 30–115)
ALT FLD-CCNC: 40 U/L — SIGNIFICANT CHANGE UP (ref 0–41)
ANION GAP SERPL CALC-SCNC: 12 MMOL/L — SIGNIFICANT CHANGE UP (ref 7–14)
ANION GAP SERPL CALC-SCNC: 14 MMOL/L — SIGNIFICANT CHANGE UP (ref 7–14)
APTT BLD: 34.3 SEC — SIGNIFICANT CHANGE UP (ref 27–39.2)
AST SERPL-CCNC: 50 U/L — HIGH (ref 0–41)
BILIRUB SERPL-MCNC: 1.4 MG/DL — HIGH (ref 0.2–1.2)
BUN SERPL-MCNC: 16 MG/DL — SIGNIFICANT CHANGE UP (ref 10–20)
BUN SERPL-MCNC: 17 MG/DL — SIGNIFICANT CHANGE UP (ref 10–20)
CALCIUM SERPL-MCNC: 10.6 MG/DL — HIGH (ref 8.5–10.1)
CALCIUM SERPL-MCNC: 11.1 MG/DL — HIGH (ref 8.5–10.1)
CHLORIDE SERPL-SCNC: 102 MMOL/L — SIGNIFICANT CHANGE UP (ref 98–110)
CHLORIDE SERPL-SCNC: 98 MMOL/L — SIGNIFICANT CHANGE UP (ref 98–110)
CO2 SERPL-SCNC: 26 MMOL/L — SIGNIFICANT CHANGE UP (ref 17–32)
CO2 SERPL-SCNC: 26 MMOL/L — SIGNIFICANT CHANGE UP (ref 17–32)
CREAT SERPL-MCNC: 0.7 MG/DL — SIGNIFICANT CHANGE UP (ref 0.7–1.5)
CREAT SERPL-MCNC: 0.8 MG/DL — SIGNIFICANT CHANGE UP (ref 0.7–1.5)
GLUCOSE SERPL-MCNC: 100 MG/DL — HIGH (ref 70–99)
GLUCOSE SERPL-MCNC: 93 MG/DL — SIGNIFICANT CHANGE UP (ref 70–99)
HCT VFR BLD CALC: 42.5 % — SIGNIFICANT CHANGE UP (ref 37–47)
HGB BLD-MCNC: 14 G/DL — SIGNIFICANT CHANGE UP (ref 12–16)
INR BLD: 1.17 RATIO — SIGNIFICANT CHANGE UP (ref 0.65–1.3)
MAGNESIUM SERPL-MCNC: 1.5 MG/DL — LOW (ref 1.8–2.4)
MCHC RBC-ENTMCNC: 27.7 PG — SIGNIFICANT CHANGE UP (ref 27–31)
MCHC RBC-ENTMCNC: 32.9 G/DL — SIGNIFICANT CHANGE UP (ref 32–37)
MCV RBC AUTO: 84.2 FL — SIGNIFICANT CHANGE UP (ref 81–99)
NRBC # BLD: 0 /100 WBCS — SIGNIFICANT CHANGE UP (ref 0–0)
PLATELET # BLD AUTO: 239 K/UL — SIGNIFICANT CHANGE UP (ref 130–400)
POTASSIUM SERPL-MCNC: 3 MMOL/L — LOW (ref 3.5–5)
POTASSIUM SERPL-MCNC: 3.6 MMOL/L — SIGNIFICANT CHANGE UP (ref 3.5–5)
POTASSIUM SERPL-SCNC: 3 MMOL/L — LOW (ref 3.5–5)
POTASSIUM SERPL-SCNC: 3.6 MMOL/L — SIGNIFICANT CHANGE UP (ref 3.5–5)
PROT SERPL-MCNC: 6.4 G/DL — SIGNIFICANT CHANGE UP (ref 6–8)
PROTHROM AB SERPL-ACNC: 13.4 SEC — HIGH (ref 9.95–12.87)
RBC # BLD: 5.05 M/UL — SIGNIFICANT CHANGE UP (ref 4.2–5.4)
RBC # FLD: 13.5 % — SIGNIFICANT CHANGE UP (ref 11.5–14.5)
SODIUM SERPL-SCNC: 138 MMOL/L — SIGNIFICANT CHANGE UP (ref 135–146)
SODIUM SERPL-SCNC: 140 MMOL/L — SIGNIFICANT CHANGE UP (ref 135–146)
WBC # BLD: 12.59 K/UL — HIGH (ref 4.8–10.8)
WBC # FLD AUTO: 12.59 K/UL — HIGH (ref 4.8–10.8)

## 2019-12-18 PROCEDURE — 99233 SBSQ HOSP IP/OBS HIGH 50: CPT

## 2019-12-18 RX ORDER — SODIUM CHLORIDE 9 MG/ML
1000 INJECTION INTRAMUSCULAR; INTRAVENOUS; SUBCUTANEOUS
Refills: 0 | Status: DISCONTINUED | OUTPATIENT
Start: 2019-12-18 | End: 2019-12-18

## 2019-12-18 RX ORDER — SODIUM CHLORIDE 9 MG/ML
1000 INJECTION INTRAMUSCULAR; INTRAVENOUS; SUBCUTANEOUS
Refills: 0 | Status: DISCONTINUED | OUTPATIENT
Start: 2019-12-18 | End: 2019-12-19

## 2019-12-18 RX ORDER — POTASSIUM CHLORIDE 20 MEQ
40 PACKET (EA) ORAL ONCE
Refills: 0 | Status: COMPLETED | OUTPATIENT
Start: 2019-12-18 | End: 2019-12-18

## 2019-12-18 RX ORDER — DEXTROSE MONOHYDRATE, SODIUM CHLORIDE, AND POTASSIUM CHLORIDE 50; .745; 4.5 G/1000ML; G/1000ML; G/1000ML
1000 INJECTION, SOLUTION INTRAVENOUS
Refills: 0 | Status: DISCONTINUED | OUTPATIENT
Start: 2019-12-18 | End: 2019-12-18

## 2019-12-18 RX ORDER — MAGNESIUM SULFATE 500 MG/ML
2 VIAL (ML) INJECTION ONCE
Refills: 0 | Status: COMPLETED | OUTPATIENT
Start: 2019-12-18 | End: 2019-12-18

## 2019-12-18 RX ADMIN — Medication 12.5 MILLIGRAM(S): at 05:00

## 2019-12-18 RX ADMIN — Medication 1 TABLET(S): at 13:54

## 2019-12-18 RX ADMIN — CHLORHEXIDINE GLUCONATE 1 APPLICATION(S): 213 SOLUTION TOPICAL at 05:00

## 2019-12-18 RX ADMIN — Medication 250 MILLIGRAM(S): at 18:46

## 2019-12-18 RX ADMIN — Medication 250 MILLIGRAM(S): at 05:01

## 2019-12-18 RX ADMIN — Medication 100 MILLIGRAM(S): at 15:12

## 2019-12-18 RX ADMIN — Medication 100 MILLIGRAM(S): at 05:00

## 2019-12-18 RX ADMIN — Medication 50 GRAM(S): at 18:46

## 2019-12-18 RX ADMIN — Medication 40 MILLIEQUIVALENT(S): at 21:51

## 2019-12-18 RX ADMIN — Medication 1 TABLET(S): at 08:13

## 2019-12-18 RX ADMIN — Medication 100 MILLIGRAM(S): at 21:51

## 2019-12-18 RX ADMIN — Medication 250 MILLIGRAM(S): at 23:16

## 2019-12-18 RX ADMIN — Medication 30 MILLIGRAM(S): at 05:01

## 2019-12-18 RX ADMIN — Medication 1 TABLET(S): at 18:47

## 2019-12-18 RX ADMIN — NYSTATIN CREAM 1 APPLICATION(S): 100000 CREAM TOPICAL at 05:02

## 2019-12-18 RX ADMIN — Medication 250 MILLIGRAM(S): at 11:37

## 2019-12-18 NOTE — PROGRESS NOTE ADULT - SUBJECTIVE AND OBJECTIVE BOX
CARDIOLOGY CONSULT NOTE     CHIEF COMPLAINT/REASON FOR CONSULT:    HPI:  68 yo F with PMHX of HTN, DM II, Obesity, AFIB on Xarelto,Hernia s/p Repair, Enterocutaneous Fistula /Abdominal Wall Abscess s/p Excision of Sinus Tract (September 2019) presents from home, recently discharged from [facility] this past Tuesday, with complaint of bilateral lower extremity weakness and inability to care for herself. Patient currently lives with her son as her own house has too many flights of stairs, states that her son is unable to care for her as well, desires to return to [facility]. Patient denies fever, chills, nausea, vomiting, sick contacts, recent travel, abdominal pain. (07 Dec 2019 03:56)      PAST MEDICAL & SURGICAL HISTORY:  Gastroesophageal reflux disease  Obesity  Diabetes mellitus  HTN (hypertension)  History of cholecystectomy  H/O hernia repair: 2011 and revised in 2013      Cardiac Risks:   [ x]HTN, [ x] DM, [ ] Smoking, [ ] FH,  [ ] Lipids        MEDICATIONS:  MEDICATIONS  (STANDING):  ceFAZolin   IVPB 1000 milliGRAM(s) IV Intermittent every 8 hours  ceFAZolin   IVPB      chlorhexidine 4% Liquid 1 Application(s) Topical <User Schedule>  hydrochlorothiazide 12.5 milliGRAM(s) Oral daily  lactobacillus acidophilus 1 Tablet(s) Oral three times a day with meals  magnesium sulfate  IVPB 2 Gram(s) IV Intermittent once  NIFEdipine XL 30 milliGRAM(s) Oral daily  nystatin Powder 1 Application(s) Topical two times a day  sodium chloride 0.9%. 1000 milliLiter(s) (75 mL/Hr) IV Continuous <Continuous>  vancomycin    Solution 250 milliGRAM(s) Oral every 6 hours      FAMILY HISTORY:      SOCIAL HISTORY:      [ ] Marital status    Allergies    No Known Allergies      	    REVIEW OF SYSTEMS:  CONSTITUTIONAL: No fever, weight loss, or fatigue  EYES: No eye pain, visual disturbances, or discharge  ENMT:  No difficulty hearing, tinnitus, vertigo; No sinus or throat pain  NECK: No pain or stiffness  RESPIRATORY: No cough, wheezing, chills or hemoptysis; No Shortness of Breath  CARDIOVASCULAR: No chest pain, palpitations, passing out, dizziness, or leg swelling  GASTROINTESTINAL: No abdominal or epigastric pain. No nausea, vomiting, or hematemesis; No diarrhea or constipation. No melena or hematochezia.  GENITOURINARY: No dysuria, frequency, hematuria, or incontinence  NEUROLOGICAL: No headaches, memory loss, loss of strength, numbness, or tremors  SKIN: No itching, burning, rashes, or lesions   	        PHYSICAL EXAM:  T(C): 36.2 (12-18-19 @ 14:37), Max: 36.4 (12-17-19 @ 20:46)  HR: 65 (12-18-19 @ 14:37) (59 - 67)  BP: 140/68 (12-18-19 @ 14:37) (140/68 - 176/94)  RR: 18 (12-18-19 @ 14:37) (18 - 18)  SpO2: --  Wt(kg): --  I&O's Summary      Appearance: Normal	  Psychiatry: A & O x 3, Mood & affect appropriate  HEENT:   Normal oral mucosa, PERRL, EOMI	  Lymphatic: No lymphadenopathy  Cardiovascular: Normal S1 S2,irreg No JVD, No murmurs  Respiratory: Lungs clear to auscultation	  Gastrointestinal:  Soft, Non-tender, + BS	  Skin: No rashes, No ecchymoses, No cyanosis	  Neurologic: Non-focal  Extremities: Normal range of motion, No clubbing, cyanosis or edema  Vascular: Peripheral pulses palpable 2+ bilaterally      ECG:  	< from: 12 Lead ECG (12.06.19 @ 23:11) >    Diagnosis Line Atrial fibrillation  Right axis deviation  Nonspecific ST-T changes    Confirmed by REINA LING MD ([id number]) on 12/7/2019 8:37:26 AM    < end of copied text >      	  LABS:	 	    CARDIAC MARKERS:                                    14.1   12.03 )-----------( 233      ( 17 Dec 2019 08:45 )             42.9     12-18    140  |  102  |  16  ----------------------------<  100<H>  3.6   |  26  |  0.8    Ca    10.6<H>      18 Dec 2019 08:21  Mg     1.5     12-18    TPro  6.4  /  Alb  3.7  /  TBili  1.4<H>  /  DBili  x   /  AST  50<H>  /  ALT  40  /  AlkPhos  142<H>  12-18

## 2019-12-18 NOTE — PROGRESS NOTE ADULT - ASSESSMENT
Patient with above history. She denies chest pain or sob, She has afib on xarelto. Patient inactive. Continue meds. Hold xarelto tonight. Resume after surgery. Note possible monica. Risk surgery moderate

## 2019-12-18 NOTE — PROGRESS NOTE ADULT - ASSESSMENT
Abdominal wall sinus tracts    Case D/W Dr. Banda today:   - pt tentatively booked for OR tomorrow for debridement of abdominal wall sinus tracts   - medical/cardio clearance, please   - hold xarelto today (order written)   - f/u labs today   - Dr. Toro aware; pt aware

## 2019-12-18 NOTE — PROGRESS NOTE ADULT - ASSESSMENT
68 yo F with PMHx GERD, Obesity, DM II, HTN, AFIB on Xarelto, Hernia s/p Repair, Enterocutaneous Fistula /Abdominal Wall Abscess s/p Excision of Sinus Tract (September 2019) presented from home, recently discharged from [facility] with complaint of bilateral lower extremity weakness and inability to care for herself. Patient currently lives with her son as her own house has too many flights of stairs but her son is unable to care for her as well and desires to return to [facility] when stable. Patient denied fever, chills, nausea, vomiting, sick contacts, recent travel, abdominal pain. She was however diagnosed with C Diff on admission and is scheduled for debridement of anterior abdominal wall sinus tracts.    Assessment & Plan:    1. Abdominal wall sinus Tracts:  s/p recent excision of sinus tract/abscess.  Surgery following: Debridement in the AM.  Continue daily wound dressing/packing & IV Cefazolin for now.       2. C. Diff infection:  PO Vancomycin until Dec 22.   Diarrhea resolving.      3. Magnesium Deficiency:  Replaced.      4. Chronic Afib:   Continue Xarelto. Rate controlled off meds.      5. GERD:   Off Protonix. Pepcid if needed.      6. Deconditioning:  STR recommended when stable.      7. HTN:  BP stable on Nifedipine & HCTZ.      8. Obesity:  BMI 36.  Dietary and lifestyle modification.      Prophylaxis: None  Code status: Full code    Progress Note Handoff:  Pending consults: Cardiology  Pending Tests: Debridement  Family/Patient discussion: Plan of care discussed with patient. Will be discharged when diarrhea improves and s/p Debridement  Disposition: STR.      Attending: Dr. Minna Toro. Spectra [phone].

## 2019-12-18 NOTE — PROGRESS NOTE ADULT - SUBJECTIVE AND OBJECTIVE BOX
S: No significant change in patient. FOr OR tomorrow for debridement of abdominal wall sinus tracts  O; Vital Signs Last 24 Hrs  T(C): 35.8 (18 Dec 2019 05:00), Max: 36.4 (17 Dec 2019 20:46)  T(F): 96.5 (18 Dec 2019 05:00), Max: 97.6 (17 Dec 2019 20:46)  HR: 59 (18 Dec 2019 05:00) (59 - 67)  BP: 146/75 (18 Dec 2019 05:00) (146/75 - 176/94)  BP(mean): --  RR: 18 (18 Dec 2019 05:00) (18 - 18)  SpO2: --    Exam:  abd: S: No significant change in patient. FOr OR tomorrow for debridement of abdominal wall sinus tracts  O; Vital Signs Last 24 Hrs  T(C): 35.8 (18 Dec 2019 05:00), Max: 36.4 (17 Dec 2019 20:46)  T(F): 96.5 (18 Dec 2019 05:00), Max: 97.6 (17 Dec 2019 20:46)  HR: 59 (18 Dec 2019 05:00) (59 - 67)  BP: 146/75 (18 Dec 2019 05:00) (146/75 - 176/94)  BP(mean): --  RR: 18 (18 Dec 2019 05:00) (18 - 18)  SpO2: --    MEDICATIONS  (STANDING):  ceFAZolin   IVPB 1000 milliGRAM(s) IV Intermittent every 8 hours  ceFAZolin   IVPB      chlorhexidine 4% Liquid 1 Application(s) Topical <User Schedule>  hydrochlorothiazide 12.5 milliGRAM(s) Oral daily  lactobacillus acidophilus 1 Tablet(s) Oral three times a day with meals  NIFEdipine XL 30 milliGRAM(s) Oral daily  nystatin Powder 1 Application(s) Topical two times a day  rivaroxaban 20 milliGRAM(s) Oral with dinner  vancomycin    Solution 250 milliGRAM(s) Oral every 6 hours    MEDICATIONS  (PRN):      Exam:  abd: abdominal  wall sinus tracts with packing in place with serosanguinous discharge; nontender, chronic surrounding skin changes    Labs:  CAPILLARY BLOOD GLUCOSE                              14.1   12.03 )-----------( 233      ( 17 Dec 2019 08:45 )             42.9         12-18    140  |  102  |  16  ----------------------------<  100<H>  3.6   |  26  |  0.8      Calcium, Total Serum: 10.6 mg/dL (12-18-19 @ 08:21)      LFTs:             6.4  | 1.4  | 50       ------------------[142     ( 18 Dec 2019 08:21 )  3.7  | x    | 40          Lipase:x      Amylase:x             Coags:

## 2019-12-18 NOTE — PROGRESS NOTE ADULT - ATTENDING COMMENTS
above noted open ulcers with drainage  will continue with present care
above noted abdomen soft dressing changed open wound with adequate drainage on iv ab
above noted for debridment in am

## 2019-12-19 LAB
ANION GAP SERPL CALC-SCNC: 12 MMOL/L — SIGNIFICANT CHANGE UP (ref 7–14)
BUN SERPL-MCNC: 15 MG/DL — SIGNIFICANT CHANGE UP (ref 10–20)
CALCIUM SERPL-MCNC: 10.6 MG/DL — HIGH (ref 8.5–10.1)
CHLORIDE SERPL-SCNC: 107 MMOL/L — SIGNIFICANT CHANGE UP (ref 98–110)
CO2 SERPL-SCNC: 22 MMOL/L — SIGNIFICANT CHANGE UP (ref 17–32)
CREAT SERPL-MCNC: 0.7 MG/DL — SIGNIFICANT CHANGE UP (ref 0.7–1.5)
GLUCOSE SERPL-MCNC: 104 MG/DL — HIGH (ref 70–99)
MAGNESIUM SERPL-MCNC: 1.9 MG/DL — SIGNIFICANT CHANGE UP (ref 1.8–2.4)
PHOSPHATE SERPL-MCNC: 3.1 MG/DL — SIGNIFICANT CHANGE UP (ref 2.1–4.9)
POTASSIUM SERPL-MCNC: 4.5 MMOL/L — SIGNIFICANT CHANGE UP (ref 3.5–5)
POTASSIUM SERPL-SCNC: 4.5 MMOL/L — SIGNIFICANT CHANGE UP (ref 3.5–5)
SODIUM SERPL-SCNC: 141 MMOL/L — SIGNIFICANT CHANGE UP (ref 135–146)

## 2019-12-19 PROCEDURE — 88304 TISSUE EXAM BY PATHOLOGIST: CPT | Mod: 26

## 2019-12-19 PROCEDURE — 99233 SBSQ HOSP IP/OBS HIGH 50: CPT

## 2019-12-19 PROCEDURE — 88305 TISSUE EXAM BY PATHOLOGIST: CPT | Mod: 26

## 2019-12-19 RX ORDER — POTASSIUM CHLORIDE 20 MEQ
20 PACKET (EA) ORAL ONCE
Refills: 0 | Status: DISCONTINUED | OUTPATIENT
Start: 2019-12-19 | End: 2019-12-19

## 2019-12-19 RX ORDER — POTASSIUM CHLORIDE 20 MEQ
40 PACKET (EA) ORAL ONCE
Refills: 0 | Status: DISCONTINUED | OUTPATIENT
Start: 2019-12-19 | End: 2019-12-19

## 2019-12-19 RX ORDER — DEXTROSE MONOHYDRATE, SODIUM CHLORIDE, AND POTASSIUM CHLORIDE 50; .745; 4.5 G/1000ML; G/1000ML; G/1000ML
1000 INJECTION, SOLUTION INTRAVENOUS
Refills: 0 | Status: DISCONTINUED | OUTPATIENT
Start: 2019-12-19 | End: 2019-12-19

## 2019-12-19 RX ORDER — PANTOPRAZOLE SODIUM 20 MG/1
40 TABLET, DELAYED RELEASE ORAL
Refills: 0 | Status: DISCONTINUED | OUTPATIENT
Start: 2019-12-19 | End: 2019-12-23

## 2019-12-19 RX ORDER — OXYCODONE AND ACETAMINOPHEN 5; 325 MG/1; MG/1
2 TABLET ORAL ONCE
Refills: 0 | Status: DISCONTINUED | OUTPATIENT
Start: 2019-12-19 | End: 2019-12-19

## 2019-12-19 RX ORDER — SODIUM CHLORIDE 9 MG/ML
1000 INJECTION, SOLUTION INTRAVENOUS
Refills: 0 | Status: DISCONTINUED | OUTPATIENT
Start: 2019-12-19 | End: 2019-12-19

## 2019-12-19 RX ORDER — OXYCODONE AND ACETAMINOPHEN 5; 325 MG/1; MG/1
1 TABLET ORAL EVERY 6 HOURS
Refills: 0 | Status: DISCONTINUED | OUTPATIENT
Start: 2019-12-19 | End: 2019-12-20

## 2019-12-19 RX ORDER — POTASSIUM CHLORIDE 20 MEQ
40 PACKET (EA) ORAL ONCE
Refills: 0 | Status: COMPLETED | OUTPATIENT
Start: 2019-12-19 | End: 2019-12-19

## 2019-12-19 RX ORDER — RIVAROXABAN 15 MG-20MG
20 KIT ORAL
Refills: 0 | Status: DISCONTINUED | OUTPATIENT
Start: 2019-12-21 | End: 2019-12-23

## 2019-12-19 RX ADMIN — DEXTROSE MONOHYDRATE, SODIUM CHLORIDE, AND POTASSIUM CHLORIDE 75 MILLILITER(S): 50; .745; 4.5 INJECTION, SOLUTION INTRAVENOUS at 07:50

## 2019-12-19 RX ADMIN — Medication 1 TABLET(S): at 17:22

## 2019-12-19 RX ADMIN — Medication 40 MILLIEQUIVALENT(S): at 06:56

## 2019-12-19 RX ADMIN — Medication 250 MILLIGRAM(S): at 12:45

## 2019-12-19 RX ADMIN — Medication 250 MILLIGRAM(S): at 05:44

## 2019-12-19 RX ADMIN — Medication 100 MILLIGRAM(S): at 14:25

## 2019-12-19 RX ADMIN — OXYCODONE AND ACETAMINOPHEN 1 TABLET(S): 5; 325 TABLET ORAL at 18:17

## 2019-12-19 RX ADMIN — CHLORHEXIDINE GLUCONATE 1 APPLICATION(S): 213 SOLUTION TOPICAL at 05:43

## 2019-12-19 RX ADMIN — Medication 100 MILLIGRAM(S): at 05:43

## 2019-12-19 RX ADMIN — OXYCODONE AND ACETAMINOPHEN 2 TABLET(S): 5; 325 TABLET ORAL at 11:16

## 2019-12-19 RX ADMIN — Medication 100 MILLIGRAM(S): at 22:02

## 2019-12-19 RX ADMIN — Medication 1 TABLET(S): at 12:36

## 2019-12-19 RX ADMIN — Medication 30 MILLIGRAM(S): at 05:43

## 2019-12-19 RX ADMIN — Medication 12.5 MILLIGRAM(S): at 05:43

## 2019-12-19 RX ADMIN — SODIUM CHLORIDE 75 MILLILITER(S): 9 INJECTION INTRAMUSCULAR; INTRAVENOUS; SUBCUTANEOUS at 06:28

## 2019-12-19 RX ADMIN — Medication 250 MILLIGRAM(S): at 17:26

## 2019-12-19 RX ADMIN — NYSTATIN CREAM 1 APPLICATION(S): 100000 CREAM TOPICAL at 17:31

## 2019-12-19 RX ADMIN — SODIUM CHLORIDE 100 MILLILITER(S): 9 INJECTION, SOLUTION INTRAVENOUS at 11:16

## 2019-12-19 NOTE — PROGRESS NOTE ADULT - SUBJECTIVE AND OBJECTIVE BOX
PATRICIA SPRAGUE  69y  Female    Patient is a 69y old  Female who presents with a chief complaint of unable to care for self, inability to ambulate (18 Dec 2019 10:40)      INTERVAL HPI/OVERNIGHT EVENTS:  No interval events.  Patient has no new complaints. No abdominal pain.  Diarrhea resolvin watery BM this AM.   s/p Debridement with surgery in the AM.      REVIEW OF SYSTEMS:  At least 10 systems were reviewed in ROS.   All systems reviewed are within normal limits.      VITALS:  T(C): 36.7 (19 Dec 2019 11:15), Max: 36.7 (19 Dec 2019 11:15)  T(F): 98 (19 Dec 2019 11:15), Max: 98 (19 Dec 2019 11:15)  HR: 63 (19 Dec 2019 11:15) (54 - 69)  BP: 164/67 (19 Dec 2019 11:15) (115/61 - 164/67)  BP(mean): --  RR: 17 (19 Dec 2019 11:15) (16 - 20)  SpO2: 98% (19 Dec 2019 11:15) (95% - 99%)      PHYSICAL EXAM:  GENERAL: NAD, well-developed  HEAD:  Atraumatic, Normocephalic  EYES: conjunctiva and sclera clear  ENMT: Moist mucous membranes  NECK: Supple, Normal thyroid  NERVOUS SYSTEM:  Alert & Oriented X3, Motor Strength 5/5 B/L upper and lower extremities.   CHEST/LUNG: Clear to auscultation bilaterally; No rales, rhonchi, wheezing, or rubs  HEART: Regular rate and rhythm; No murmurs, rubs, or gallops  ABDOMEN: Soft, Nontender, Nondistended; Bowel sounds present  EXTREMITIES:  2+ Peripheral Pulses, No clubbing, cyanosis, or edema  LYMPH: No lymphadenopathy noted  SKIN: RT abdominal wall sinus tracts.      Consultant(s) Notes Reviewed:  [x ] YES  [ ] NO  Care Discussed with Consultants/Other Providers [ x] YES  [ ] NO    LABS:                                14.0   12.59 )-----------( 239      ( 18 Dec 2019 21:25 )             42.5       -    141  |  107  |  15  ----------------------------<  104<H>  4.5   |  22  |  0.7    Ca    10.6<H>      19 Dec 2019 07:30  Mg     1.5         TPro  6.4  /  Alb  3.7  /  TBili  1.4<H>  /  DBili  x   /  AST  50<H>  /  ALT  40  /  AlkPhos  142<H>          MICROBIOLOGY:  Culture - Blood (19 @ 01:10)    Specimen Source: .Blood Blood-Peripheral    Culture Results:   No growth at 5 days.      Culture - Blood (19 @ 01:10)    Specimen Source: .Blood Blood-Peripheral    Culture Results:   No growth at 5 days.      C Diff by PCR Result: Positive (19 @ 09:23)      RADIOLOGY & ADDITIONAL TESTS:  X-ray Chest 1 View- PORTABLE-Urgent (19 @ 02:17)  Cardiomegaly. Bibasilar focal atelectasis.      CT Head No Cont (19 @ 00:48)   No CT evidence of acute intracranial pathology.      Imaging Personally Reviewed:  [x] YES  [ ] NO    MEDICATIONS  (STANDING):  ceFAZolin   IVPB 1000 milliGRAM(s) IV Intermittent every 8 hours  chlorhexidine 4% Liquid 1 Application(s) Topical <User Schedule>  hydrochlorothiazide 12.5 milliGRAM(s) Oral daily  lactobacillus acidophilus 1 Tablet(s) Oral three times a day with meals  NIFEdipine XL 30 milliGRAM(s) Oral daily  nystatin Powder 1 Application(s) Topical two times a day  pantoprazole    Tablet 40 milliGRAM(s) Oral before breakfast  potassium chloride  20 mEq/100 mL IVPB 20 milliEquivalent(s) IV Intermittent once  sodium chloride 0.9% with potassium chloride 20 mEq/L 1000 milliLiter(s) (75 mL/Hr) IV Continuous <Continuous>  vancomycin    Solution 250 milliGRAM(s) Oral every 6 hours      MEDICATIONS  (PRN):  oxycodone    5 mG/acetaminophen 325 mG 1 Tablet(s) Oral every 6 hours PRN Severe Pain (7 - 10)        Home Medications:  hydroCHLOROthiazide 12.5 mg oral capsule: 1 cap(s) orally once a day (06 Dec 2019 22:29)  NIFEdipine 30 mg oral tablet, extended release: 1 tab(s) orally once a day (06 Dec 2019 22:29)  oxycodone-acetaminophen 5 mg-325 mg oral tablet: 1 tab(s) orally every 8 hours, As Needed - 6) for severe pain  (06 Dec 2019 22:29)  pantoprazole 40 mg oral delayed release tablet: 1 tab(s) orally once a day (before a meal) (06 Dec 2019 22:29)  rivaroxaban 20 mg oral tablet: 1 tab(s) orally once a day (before a meal) (06 Dec 2019 22:29)        HEALTH ISSUES - PROBLEM Dx:  C diff colitis  Abdominal wall sinus tracts  Gastroesophageal reflux disease  Obesity  Diabetes mellitus  HTN (hypertension)  History of cholecystectomy  H/O hernia repair:  and revised in 2013

## 2019-12-19 NOTE — CHART NOTE - NSCHARTNOTEFT_GEN_A_CORE
Registered Dietitian Follow-Up     Patient Profile Reviewed                           Yes [x]   No []     Nutrition History Previously Obtained        Yes [x]  No []       Pertinent Medical Interventions: Abdominal wall sinus Tracts s/p recent excision of sinus tract/abscess. s/p debridement . C.diff infection - diarrhea noted resolving.     Diet order: DASH/TLC + Consistent Carbohydrate (no snacks); no lactose.     Anthropometrics:  - Ht. 167.64 cm.  - Wt. 101.4 kg ( &  dosing wt) vs. 105.3 kg ( bedscale wt). Will continue to monitor wt trends.  - BMI 36.1 (using lowest wt this admit 101.4 kg)  - IBW 59.1 kg.     Pertinent Lab Data: : gluc-104, Ca-10.6; per progress notes, plan to follow-up on HgbA1C     Pertinent Meds: protonix, acidophilus     Physical Findings:  - Appearance: alert  - GI function: Last BM  (4 loose BMs today)  - Tubes: no feeding tubes  - Oral/Mouth cavity: no reported chewing/swallowing difficulties at this time  - Skin: R abdominal wound with dressing     Nutrition Requirements  Weight Used: Dosin.4 kg lowest ABW & IBW: 59 kg (needs continued from initial assessment on )     Estimated Energy Needs    Continue [x]  [id number] (MSJ x 1-1.1 AF) Obese BMI considered        Estimated Protein Needs    Continue [x] 59-71 g (1-1.2 g/kg IBW) same as above        Estimated Fluid Needs        Continue [x] 1mL/kcal or per LIP     Nutrient Intake: Appetite & po intake remains poor d/t abd discomfort & diarrhea. Consuming 50% of meals. Agreeable to po supplement.      [x] Previous Nutrition Diagnosis: Inadequate oral intake            [x] Ongoing               Nutrition Intervention Meals & Snacks, Medical Food Supplements, Vitamin & Mineral Supplements     Goal/Expected Outcome: pt to achieve & maintain at least 75% po intake over next 4 days.     Indicator/Monitoring: Energy intake, glucose profile, renal profile, nutrition focused physical findings, body composition.    Recommendation: (1) Order Glucerna q24hrs. (2) Check HgbA1C. (3) Order MVI q24hrs. Reviewed with LIP on unit.

## 2019-12-19 NOTE — PROGRESS NOTE ADULT - ASSESSMENT
70 yo F with PMHx GERD, Obesity, DM II, HTN, AFIB on Xarelto, Hernia s/p Repair, Enterocutaneous Fistula /Abdominal Wall Abscess s/p Excision of Sinus Tract (September 2019) presented from home, recently discharged from [facility] with complaint of bilateral lower extremity weakness and inability to care for herself. Patient currently lives with her son as her own house has too many flights of stairs but her son is unable to care for her as well and desires to return to [facility] when stable. Patient denied fever, chills, nausea, vomiting, sick contacts, recent travel, abdominal pain. She was however diagnosed with C Diff on admission and is scheduled for debridement of anterior abdominal wall sinus tracts.    Assessment & Plan:    1. Abdominal wall sinus Tracts:  s/p recent excision of sinus tract/abscess.  Surgery following: s/p Debridement 12/19.  Continue daily wound dressing/packing & IV Cefazolin for now.       2. C. Diff infection:  PO Vancomycin until Dec 22.   Diarrhea resolving.  On Probiotics.      3. Hypokalemia & Magnesium Deficiency:  Replaced. Monitor levels.      4. Chronic Afib:   Resume Xarelto. Rate controlled off meds.      5. GERD:   Off Protonix. Pepcid if needed.      6. Deconditioning:  STR recommended when stable.      7. HTN:  BP stable on Nifedipine & HCTZ.      8. Obesity:  BMI 36.  Dietary and lifestyle modification.      9. DM II:  Diet controlled.  Follow up A1c.        Prophylaxis: None  Code status: Full code    Progress Note Handoff:  Pending consults: None  Pending Tests: None  Family/Patient discussion: Plan of care discussed with patient. Will be discharged when diarrhea improves and s/p Debridement  Disposition: STR when stable.      Attending: Dr. Minna Toro. Spectra 1503.

## 2019-12-19 NOTE — PRE-OP CHECKLIST - RESPIRATORY RATE (BREATHS/MIN)
"Initial /56 (BP Location: Left arm, Patient Position: Sitting, Cuff Size: Child)  Pulse 86  SpO2 96% Estimated body mass index is 16.94 kg/(m^2) as calculated from the following:    Height as of 3/1/18: 1.105 m (3' 7.5\").    Weight as of 3/1/18: 20.7 kg (45 lb 9.6 oz). .      "
18

## 2019-12-20 LAB
ALBUMIN SERPL ELPH-MCNC: 3.5 G/DL — SIGNIFICANT CHANGE UP (ref 3.5–5.2)
ALP SERPL-CCNC: 179 U/L — HIGH (ref 30–115)
ALT FLD-CCNC: 51 U/L — HIGH (ref 0–41)
ANION GAP SERPL CALC-SCNC: 11 MMOL/L — SIGNIFICANT CHANGE UP (ref 7–14)
AST SERPL-CCNC: 64 U/L — HIGH (ref 0–41)
BASOPHILS # BLD AUTO: 0.12 K/UL — SIGNIFICANT CHANGE UP (ref 0–0.2)
BASOPHILS NFR BLD AUTO: 1.3 % — HIGH (ref 0–1)
BILIRUB SERPL-MCNC: 2 MG/DL — HIGH (ref 0.2–1.2)
BUN SERPL-MCNC: 17 MG/DL — SIGNIFICANT CHANGE UP (ref 10–20)
CALCIUM SERPL-MCNC: 10.5 MG/DL — HIGH (ref 8.5–10.1)
CHLORIDE SERPL-SCNC: 103 MMOL/L — SIGNIFICANT CHANGE UP (ref 98–110)
CO2 SERPL-SCNC: 25 MMOL/L — SIGNIFICANT CHANGE UP (ref 17–32)
CREAT SERPL-MCNC: 0.8 MG/DL — SIGNIFICANT CHANGE UP (ref 0.7–1.5)
EOSINOPHIL # BLD AUTO: 0.45 K/UL — SIGNIFICANT CHANGE UP (ref 0–0.7)
EOSINOPHIL NFR BLD AUTO: 4.8 % — SIGNIFICANT CHANGE UP (ref 0–8)
ESTIMATED AVERAGE GLUCOSE: 114 MG/DL — SIGNIFICANT CHANGE UP (ref 68–114)
GLUCOSE SERPL-MCNC: 92 MG/DL — SIGNIFICANT CHANGE UP (ref 70–99)
GRAM STN FLD: SIGNIFICANT CHANGE UP
HBA1C BLD-MCNC: 5.6 % — SIGNIFICANT CHANGE UP (ref 4–5.6)
HCT VFR BLD CALC: 38.1 % — SIGNIFICANT CHANGE UP (ref 37–47)
HGB BLD-MCNC: 12.3 G/DL — SIGNIFICANT CHANGE UP (ref 12–16)
IMM GRANULOCYTES NFR BLD AUTO: 0.4 % — HIGH (ref 0.1–0.3)
LYMPHOCYTES # BLD AUTO: 1.97 K/UL — SIGNIFICANT CHANGE UP (ref 1.2–3.4)
LYMPHOCYTES # BLD AUTO: 20.9 % — SIGNIFICANT CHANGE UP (ref 20.5–51.1)
MAGNESIUM SERPL-MCNC: 1.6 MG/DL — LOW (ref 1.8–2.4)
MCHC RBC-ENTMCNC: 27.9 PG — SIGNIFICANT CHANGE UP (ref 27–31)
MCHC RBC-ENTMCNC: 32.3 G/DL — SIGNIFICANT CHANGE UP (ref 32–37)
MCV RBC AUTO: 86.4 FL — SIGNIFICANT CHANGE UP (ref 81–99)
MONOCYTES # BLD AUTO: 0.76 K/UL — HIGH (ref 0.1–0.6)
MONOCYTES NFR BLD AUTO: 8.1 % — SIGNIFICANT CHANGE UP (ref 1.7–9.3)
NEUTROPHILS # BLD AUTO: 6.1 K/UL — SIGNIFICANT CHANGE UP (ref 1.4–6.5)
NEUTROPHILS NFR BLD AUTO: 64.5 % — SIGNIFICANT CHANGE UP (ref 42.2–75.2)
NRBC # BLD: 0 /100 WBCS — SIGNIFICANT CHANGE UP (ref 0–0)
PLATELET # BLD AUTO: 189 K/UL — SIGNIFICANT CHANGE UP (ref 130–400)
POTASSIUM SERPL-MCNC: 3.5 MMOL/L — SIGNIFICANT CHANGE UP (ref 3.5–5)
POTASSIUM SERPL-SCNC: 3.5 MMOL/L — SIGNIFICANT CHANGE UP (ref 3.5–5)
PROT SERPL-MCNC: 5.9 G/DL — LOW (ref 6–8)
RBC # BLD: 4.41 M/UL — SIGNIFICANT CHANGE UP (ref 4.2–5.4)
RBC # FLD: 13.6 % — SIGNIFICANT CHANGE UP (ref 11.5–14.5)
SODIUM SERPL-SCNC: 139 MMOL/L — SIGNIFICANT CHANGE UP (ref 135–146)
SPECIMEN SOURCE: SIGNIFICANT CHANGE UP
WBC # BLD: 9.44 K/UL — SIGNIFICANT CHANGE UP (ref 4.8–10.8)
WBC # FLD AUTO: 9.44 K/UL — SIGNIFICANT CHANGE UP (ref 4.8–10.8)

## 2019-12-20 PROCEDURE — 99233 SBSQ HOSP IP/OBS HIGH 50: CPT

## 2019-12-20 RX ORDER — OXYCODONE AND ACETAMINOPHEN 5; 325 MG/1; MG/1
1 TABLET ORAL EVERY 6 HOURS
Refills: 0 | Status: DISCONTINUED | OUTPATIENT
Start: 2019-12-20 | End: 2019-12-23

## 2019-12-20 RX ORDER — MAGNESIUM SULFATE 500 MG/ML
2 VIAL (ML) INJECTION ONCE
Refills: 0 | Status: COMPLETED | OUTPATIENT
Start: 2019-12-20 | End: 2019-12-20

## 2019-12-20 RX ORDER — OXYCODONE AND ACETAMINOPHEN 5; 325 MG/1; MG/1
2 TABLET ORAL EVERY 4 HOURS
Refills: 0 | Status: DISCONTINUED | OUTPATIENT
Start: 2019-12-20 | End: 2019-12-23

## 2019-12-20 RX ADMIN — NYSTATIN CREAM 1 APPLICATION(S): 100000 CREAM TOPICAL at 05:31

## 2019-12-20 RX ADMIN — OXYCODONE AND ACETAMINOPHEN 1 TABLET(S): 5; 325 TABLET ORAL at 06:24

## 2019-12-20 RX ADMIN — NYSTATIN CREAM 1 APPLICATION(S): 100000 CREAM TOPICAL at 17:49

## 2019-12-20 RX ADMIN — Medication 250 MILLIGRAM(S): at 17:50

## 2019-12-20 RX ADMIN — Medication 1 TABLET(S): at 09:05

## 2019-12-20 RX ADMIN — Medication 1 TABLET(S): at 17:48

## 2019-12-20 RX ADMIN — Medication 250 MILLIGRAM(S): at 05:30

## 2019-12-20 RX ADMIN — OXYCODONE AND ACETAMINOPHEN 2 TABLET(S): 5; 325 TABLET ORAL at 15:49

## 2019-12-20 RX ADMIN — Medication 100 MILLIGRAM(S): at 05:31

## 2019-12-20 RX ADMIN — OXYCODONE AND ACETAMINOPHEN 2 TABLET(S): 5; 325 TABLET ORAL at 15:06

## 2019-12-20 RX ADMIN — PANTOPRAZOLE SODIUM 40 MILLIGRAM(S): 20 TABLET, DELAYED RELEASE ORAL at 05:30

## 2019-12-20 RX ADMIN — OXYCODONE AND ACETAMINOPHEN 1 TABLET(S): 5; 325 TABLET ORAL at 00:23

## 2019-12-20 RX ADMIN — Medication 1 TABLET(S): at 12:06

## 2019-12-20 RX ADMIN — Medication 50 GRAM(S): at 12:21

## 2019-12-20 RX ADMIN — OXYCODONE AND ACETAMINOPHEN 2 TABLET(S): 5; 325 TABLET ORAL at 20:51

## 2019-12-20 RX ADMIN — Medication 250 MILLIGRAM(S): at 12:08

## 2019-12-20 RX ADMIN — OXYCODONE AND ACETAMINOPHEN 2 TABLET(S): 5; 325 TABLET ORAL at 20:53

## 2019-12-20 RX ADMIN — OXYCODONE AND ACETAMINOPHEN 1 TABLET(S): 5; 325 TABLET ORAL at 07:20

## 2019-12-20 RX ADMIN — Medication 250 MILLIGRAM(S): at 00:23

## 2019-12-20 NOTE — PROGRESS NOTE ADULT - SUBJECTIVE AND OBJECTIVE BOX
Patient seen & examined in the morning.  No acute events noted overnight.  Patient is doing well, denies pain.  Dressing was changed, pt tolerated it well.       I&O's Detail    19 Dec 2019 07:01  -  20 Dec 2019 07:00  --------------------------------------------------------  IN:    lactated ringers.: 70 mL  Total IN: 70 mL    OUT:    Voided: 600 mL  Total OUT: 600 mL    Total NET: -530 mL          MEDICATIONS  (STANDING):  chlorhexidine 4% Liquid 1 Application(s) Topical <User Schedule>  hydrochlorothiazide 12.5 milliGRAM(s) Oral daily  lactobacillus acidophilus 1 Tablet(s) Oral three times a day with meals  NIFEdipine XL 30 milliGRAM(s) Oral daily  nystatin Powder 1 Application(s) Topical two times a day  pantoprazole    Tablet 40 milliGRAM(s) Oral before breakfast  vancomycin    Solution 250 milliGRAM(s) Oral every 6 hours    MEDICATIONS  (PRN):  oxycodone    5 mG/acetaminophen 325 mG 1 Tablet(s) Oral every 6 hours PRN Severe Pain (7 - 10)          Vital Signs Last 24 Hrs  T(C): 35.5 (20 Dec 2019 02:30), Max: 36.9 (19 Dec 2019 18:15)  T(F): 95.9 (20 Dec 2019 02:30), Max: 98.4 (19 Dec 2019 18:15)  HR: 51 (20 Dec 2019 02:30) (47 - 69)  BP: 125/67 (20 Dec 2019 02:30) (109/58 - 164/67)  RR: 18 (20 Dec 2019 02:30) (16 - 20)  SpO2: 98% (19 Dec 2019 11:15) (95% - 99%)      Physical Exam:  General:  NAD.   Abdomen:  packing and dressing was changed, non tender, chronic surrounding skin changes.    LABS:                        14.0   12.59 )-----------( 239      ( 18 Dec 2019 21:25 )             42.5     12-20    139  |  103  |  17  ----------------------------<  92  3.5   |  25  |  0.8    Ca    10.5<H>      20 Dec 2019 07:09  Phos  3.1     12-19  Mg     1.6     12-20    TPro  5.9<L>  /  Alb  3.5  /  TBili  2.0<H>  /  DBili  x   /  AST  64<H>  /  ALT  51<H>  /  AlkPhos  179<H>  12-20    PT/INR - ( 18 Dec 2019 21:25 )   PT: 13.40 sec;   INR: 1.17 ratio         PTT - ( 18 Dec 2019 21:25 )  PTT:34.3 sec        RADIOLOGY & ADDITIONAL STUDIES:

## 2019-12-20 NOTE — PROGRESS NOTE ADULT - ASSESSMENT
Pt is POD#1 s/p excision of abdominal wall sinus tracts.    Plan:  - Daily dressing change by nurses, irrigate with saline and pack with 1 inch packing  - Follow up OR culture  - Restart Xarelto tomorrow  - Will follow    Will discuss with the Attending

## 2019-12-20 NOTE — PROGRESS NOTE ADULT - ASSESSMENT
68 yo F with PMHx GERD, Obesity, DM II, HTN, AFIB on Xarelto, Hernia s/p Repair, Enterocutaneous Fistula /Abdominal Wall Abscess s/p Excision of Sinus Tract (September 2019) presented from home, recently discharged from [facility] with complaint of bilateral lower extremity weakness and inability to care for herself. Patient currently lives with her son as her own house has too many flights of stairs but her son is unable to care for her as well and desires to return to [facility] when stable. Patient denied fever, chills, nausea, vomiting, sick contacts, recent travel, abdominal pain. She was however diagnosed with C Diff on admission and is scheduled for debridement of anterior abdominal wall sinus tracts.    Assessment & Plan:    1. Abdominal wall sinus Tracts:  s/p recent excision of sinus tract/abscess.  Surgery following: s/p Debridement 12/19.  Continue daily wound dressing/packing. IV Cefazolin discontinued    2. C. Diff infection:  PO Vancomycin until Dec 22.   Diarrhea resolving.  On Probiotics.    3. Hypokalemia & Magnesium Deficiency:  Replaced. Monitor levels.    4. Chronic Afib:   Resume Xarelto. Rate controlled off meds.    5. GERD:   Off Protonix. Pepcid if needed.    6. Deconditioning:  STR recommended when stable.    7. HTN:  BP stable on Nifedipine & HCTZ.    8. Obesity:  BMI 36.  Dietary and lifestyle modification.    9. DM II:  Diet controlled.  Follow up A1c.    Prophylaxis: None  Code status: Full code    Progress Note Handoff:  Pending consults: None  Pending Tests: None  Family/Patient discussion: Plan of care discussed with patient. Anticipate DC tomorrow to NH  Disposition: STR when stable.

## 2019-12-20 NOTE — PROGRESS NOTE ADULT - SUBJECTIVE AND OBJECTIVE BOX
PATRICIA SPRAGUE  69y, Female  Allergy: No Known Allergies    Hospital Day: 13d    Patient seen and examined earlier today. No acute events overnight.    PMH/PSH:  PAST MEDICAL & SURGICAL HISTORY:  Gastroesophageal reflux disease  Obesity  Diabetes mellitus  HTN (hypertension)  History of cholecystectomy  H/O hernia repair: 2011 and revised in 2013    VITALS:  T(F): 95.9 (12-20-19 @ 02:30), Max: 98.4 (12-19-19 @ 18:15)  HR: 51 (12-20-19 @ 02:30)  BP: 125/67 (12-20-19 @ 02:30) (109/58 - 164/67)  RR: 18 (12-20-19 @ 02:30)  SpO2: 98% (12-19-19 @ 11:15)    TESTS & MEASUREMENTS:  Weight (Kg): 101.4 (12-19-19 @ 09:43)  BMI (kg/m2): 36.1 (12-19)    12-18-19 @ 07:01  -  12-19-19 @ 07:00  --------------------------------------------------------  IN: 0 mL / OUT: 600 mL / NET: -600 mL    12-19-19 @ 07:01  -  12-20-19 @ 07:00  --------------------------------------------------------  IN: 70 mL / OUT: 600 mL / NET: -530 mL                        12.3   9.44  )-----------( 189      ( 20 Dec 2019 07:09 )             38.1     PT/INR - ( 18 Dec 2019 21:25 )   PT: 13.40 sec;   INR: 1.17 ratio         PTT - ( 18 Dec 2019 21:25 )  PTT:34.3 sec  12-20    139  |  103  |  17  ----------------------------<  92  3.5   |  25  |  0.8    Ca    10.5<H>      20 Dec 2019 07:09  Phos  3.1     12-19  Mg     1.6     12-20    TPro  5.9<L>  /  Alb  3.5  /  TBili  2.0<H>  /  DBili  x   /  AST  64<H>  /  ALT  51<H>  /  AlkPhos  179<H>  12-20    LIVER FUNCTIONS - ( 20 Dec 2019 07:09 )  Alb: 3.5 g/dL / Pro: 5.9 g/dL / ALK PHOS: 179 U/L / ALT: 51 U/L / AST: 64 U/L / GGT: x           Culture - Body Fluid with Gram Stain (collected 12-19-19 @ 14:26)  Source: Abdominal Fl abdominal wall  Gram Stain (12-20-19 @ 01:58):    No polymorphonuclear cells seen    No organisms seen    by cytocentrifuge    RECENT DIAGNOSTIC ORDERS:  Surgical Pathology Report: 09:50 (12-19-19 @ 11:13)  Hemoglobin A1C with Mean Plasma Glucose: AM Sched. Collection: 20-Dec-2019 04:30 (12-19-19 @ 12:00)  Diet, DASH/TLC:   Sodium & Cholesterol Restricted  Consistent Carbohydrate No Snacks  No Lactose (12-19-19 @ 12:00)    MEDICATIONS:  MEDICATIONS  (STANDING):  chlorhexidine 4% Liquid 1 Application(s) Topical <User Schedule>  hydrochlorothiazide 12.5 milliGRAM(s) Oral daily  lactobacillus acidophilus 1 Tablet(s) Oral three times a day with meals  NIFEdipine XL 30 milliGRAM(s) Oral daily  nystatin Powder 1 Application(s) Topical two times a day  pantoprazole    Tablet 40 milliGRAM(s) Oral before breakfast  vancomycin    Solution 250 milliGRAM(s) Oral every 6 hours    MEDICATIONS  (PRN):  oxycodone    5 mG/acetaminophen 325 mG 1 Tablet(s) Oral every 6 hours PRN Severe Pain (7 - 10)    HOME MEDICATIONS:  hydroCHLOROthiazide 12.5 mg oral capsule (12-06)  NIFEdipine 30 mg oral tablet, extended release (12-06)  oxycodone-acetaminophen 5 mg-325 mg oral tablet (12-06)  pantoprazole 40 mg oral delayed release tablet (12-06)  rivaroxaban 20 mg oral tablet (12-06)      REVIEW OF SYSTEMS:  All other review of systems is negative unless indicated above.     PHYSICAL EXAM:  GENERAL: NAD  HEENT: No Swelling  CHEST/LUNG: Good air entry, No wheezing  HEART: RRR, No murmurs  ABDOMEN: Soft, Bowel sounds present, abdominal wound in dressing  EXTREMITIES:  No clubbing

## 2019-12-20 NOTE — CHART NOTE - NSCHARTNOTEFT_GEN_A_CORE
70 y/o female s/p abdominal wall abscess debridement. Seen and examined. NAD. Dressing in place, C/D/I. Vitals stable. No complaints.   A/P: Pain mgmt  -Am labs  -IV hydration  -GI and DVT prophylaxis

## 2019-12-21 DIAGNOSIS — L02.211 CUTANEOUS ABSCESS OF ABDOMINAL WALL: ICD-10-CM

## 2019-12-21 LAB
ALBUMIN SERPL ELPH-MCNC: 3.5 G/DL — SIGNIFICANT CHANGE UP (ref 3.5–5.2)
ALP SERPL-CCNC: 191 U/L — HIGH (ref 30–115)
ALT FLD-CCNC: 46 U/L — HIGH (ref 0–41)
ANION GAP SERPL CALC-SCNC: 14 MMOL/L — SIGNIFICANT CHANGE UP (ref 7–14)
AST SERPL-CCNC: 52 U/L — HIGH (ref 0–41)
BILIRUB SERPL-MCNC: 1.4 MG/DL — HIGH (ref 0.2–1.2)
BUN SERPL-MCNC: 19 MG/DL — SIGNIFICANT CHANGE UP (ref 10–20)
CALCIUM SERPL-MCNC: 10.2 MG/DL — HIGH (ref 8.5–10.1)
CHLORIDE SERPL-SCNC: 103 MMOL/L — SIGNIFICANT CHANGE UP (ref 98–110)
CO2 SERPL-SCNC: 22 MMOL/L — SIGNIFICANT CHANGE UP (ref 17–32)
CREAT SERPL-MCNC: 0.7 MG/DL — SIGNIFICANT CHANGE UP (ref 0.7–1.5)
GLUCOSE SERPL-MCNC: 96 MG/DL — SIGNIFICANT CHANGE UP (ref 70–99)
HCT VFR BLD CALC: 36.2 % — LOW (ref 37–47)
HGB BLD-MCNC: 11.8 G/DL — LOW (ref 12–16)
MCHC RBC-ENTMCNC: 28 PG — SIGNIFICANT CHANGE UP (ref 27–31)
MCHC RBC-ENTMCNC: 32.6 G/DL — SIGNIFICANT CHANGE UP (ref 32–37)
MCV RBC AUTO: 85.8 FL — SIGNIFICANT CHANGE UP (ref 81–99)
NRBC # BLD: 0 /100 WBCS — SIGNIFICANT CHANGE UP (ref 0–0)
PLATELET # BLD AUTO: 139 K/UL — SIGNIFICANT CHANGE UP (ref 130–400)
POTASSIUM SERPL-MCNC: 3.2 MMOL/L — LOW (ref 3.5–5)
POTASSIUM SERPL-SCNC: 3.2 MMOL/L — LOW (ref 3.5–5)
PROT SERPL-MCNC: 5.5 G/DL — LOW (ref 6–8)
RBC # BLD: 4.22 M/UL — SIGNIFICANT CHANGE UP (ref 4.2–5.4)
RBC # FLD: 13.5 % — SIGNIFICANT CHANGE UP (ref 11.5–14.5)
SODIUM SERPL-SCNC: 139 MMOL/L — SIGNIFICANT CHANGE UP (ref 135–146)
WBC # BLD: 9.81 K/UL — SIGNIFICANT CHANGE UP (ref 4.8–10.8)
WBC # FLD AUTO: 9.81 K/UL — SIGNIFICANT CHANGE UP (ref 4.8–10.8)

## 2019-12-21 PROCEDURE — 99233 SBSQ HOSP IP/OBS HIGH 50: CPT

## 2019-12-21 RX ORDER — POTASSIUM CHLORIDE 20 MEQ
40 PACKET (EA) ORAL EVERY 4 HOURS
Refills: 0 | Status: DISCONTINUED | OUTPATIENT
Start: 2019-12-21 | End: 2019-12-21

## 2019-12-21 RX ORDER — POTASSIUM CHLORIDE 20 MEQ
40 PACKET (EA) ORAL EVERY 4 HOURS
Refills: 0 | Status: COMPLETED | OUTPATIENT
Start: 2019-12-21 | End: 2019-12-21

## 2019-12-21 RX ADMIN — OXYCODONE AND ACETAMINOPHEN 2 TABLET(S): 5; 325 TABLET ORAL at 01:43

## 2019-12-21 RX ADMIN — Medication 40 MILLIEQUIVALENT(S): at 18:44

## 2019-12-21 RX ADMIN — NYSTATIN CREAM 1 APPLICATION(S): 100000 CREAM TOPICAL at 17:30

## 2019-12-21 RX ADMIN — Medication 12.5 MILLIGRAM(S): at 13:39

## 2019-12-21 RX ADMIN — OXYCODONE AND ACETAMINOPHEN 2 TABLET(S): 5; 325 TABLET ORAL at 23:18

## 2019-12-21 RX ADMIN — Medication 1 TABLET(S): at 09:30

## 2019-12-21 RX ADMIN — Medication 1 TABLET(S): at 17:30

## 2019-12-21 RX ADMIN — OXYCODONE AND ACETAMINOPHEN 2 TABLET(S): 5; 325 TABLET ORAL at 18:53

## 2019-12-21 RX ADMIN — OXYCODONE AND ACETAMINOPHEN 2 TABLET(S): 5; 325 TABLET ORAL at 01:51

## 2019-12-21 RX ADMIN — OXYCODONE AND ACETAMINOPHEN 2 TABLET(S): 5; 325 TABLET ORAL at 10:00

## 2019-12-21 RX ADMIN — NYSTATIN CREAM 1 APPLICATION(S): 100000 CREAM TOPICAL at 06:01

## 2019-12-21 RX ADMIN — Medication 40 MILLIEQUIVALENT(S): at 14:59

## 2019-12-21 RX ADMIN — OXYCODONE AND ACETAMINOPHEN 2 TABLET(S): 5; 325 TABLET ORAL at 14:50

## 2019-12-21 RX ADMIN — Medication 250 MILLIGRAM(S): at 00:12

## 2019-12-21 RX ADMIN — OXYCODONE AND ACETAMINOPHEN 2 TABLET(S): 5; 325 TABLET ORAL at 14:22

## 2019-12-21 RX ADMIN — OXYCODONE AND ACETAMINOPHEN 2 TABLET(S): 5; 325 TABLET ORAL at 09:29

## 2019-12-21 RX ADMIN — Medication 250 MILLIGRAM(S): at 06:02

## 2019-12-21 RX ADMIN — PANTOPRAZOLE SODIUM 40 MILLIGRAM(S): 20 TABLET, DELAYED RELEASE ORAL at 06:03

## 2019-12-21 RX ADMIN — Medication 1 TABLET(S): at 13:39

## 2019-12-21 RX ADMIN — Medication 250 MILLIGRAM(S): at 23:19

## 2019-12-21 RX ADMIN — OXYCODONE AND ACETAMINOPHEN 2 TABLET(S): 5; 325 TABLET ORAL at 19:30

## 2019-12-21 RX ADMIN — Medication 250 MILLIGRAM(S): at 13:39

## 2019-12-21 RX ADMIN — RIVAROXABAN 20 MILLIGRAM(S): KIT at 17:30

## 2019-12-21 RX ADMIN — Medication 250 MILLIGRAM(S): at 17:30

## 2019-12-21 NOTE — PROGRESS NOTE ADULT - SUBJECTIVE AND OBJECTIVE BOX
DARCIEPATRICIA  69y, Female  Allergy: No Known Allergies    Hospital Day: 14d    Patient seen and examined earlier today. No acute events overnight.    PMH/PSH:  PAST MEDICAL & SURGICAL HISTORY:  Gastroesophageal reflux disease  Obesity  Diabetes mellitus  HTN (hypertension)  History of cholecystectomy  H/O hernia repair: 2011 and revised in 2013    VITALS:  T(F): 96.6 (12-21-19 @ 05:57), Max: 98.3 (12-20-19 @ 14:28)  HR: 50 (12-21-19 @ 05:57)  BP: 111/57 (12-21-19 @ 05:57) (111/57 - 137/63)  RR: 16 (12-21-19 @ 05:57)  SpO2: --    TESTS & MEASUREMENTS:  Weight (Kg):   BMI (kg/m2): 36.1 (12-19)    12-19-19 @ 07:01  -  12-20-19 @ 07:00  --------------------------------------------------------  IN: 70 mL / OUT: 600 mL / NET: -530 mL    12-20-19 @ 07:01  -  12-21-19 @ 07:00  --------------------------------------------------------  IN: 360 mL / OUT: 1300 mL / NET: -940 mL                        11.8   9.81  )-----------( 139      ( 21 Dec 2019 08:04 )             36.2     12-21    139  |  103  |  19  ----------------------------<  96  3.2<L>   |  22  |  0.7    Ca    10.2<H>      21 Dec 2019 08:04  Mg     1.6     12-20    TPro  5.5<L>  /  Alb  3.5  /  TBili  1.4<H>  /  DBili  x   /  AST  52<H>  /  ALT  46<H>  /  AlkPhos  191<H>  12-21    LIVER FUNCTIONS - ( 21 Dec 2019 08:04 )  Alb: 3.5 g/dL / Pro: 5.5 g/dL / ALK PHOS: 191 U/L / ALT: 46 U/L / AST: 52 U/L / GGT: x           Culture - Body Fluid with Gram Stain (collected 12-19-19 @ 14:26)  Source: Abdominal Fl abdominal wall  Gram Stain (12-20-19 @ 01:58):    No polymorphonuclear cells seen    No organisms seen    by cytocentrifuge  Preliminary Report (12-20-19 @ 23:01):    No growth    MEDICATIONS:  MEDICATIONS  (STANDING):  chlorhexidine 4% Liquid 1 Application(s) Topical <User Schedule>  hydrochlorothiazide 12.5 milliGRAM(s) Oral daily  lactobacillus acidophilus 1 Tablet(s) Oral three times a day with meals  NIFEdipine XL 30 milliGRAM(s) Oral daily  nystatin Powder 1 Application(s) Topical two times a day  pantoprazole    Tablet 40 milliGRAM(s) Oral before breakfast  rivaroxaban 20 milliGRAM(s) Oral with dinner  vancomycin    Solution 250 milliGRAM(s) Oral every 6 hours    MEDICATIONS  (PRN):  oxycodone    5 mG/acetaminophen 325 mG 2 Tablet(s) Oral every 4 hours PRN Severe Pain (7 - 10)  oxycodone    5 mG/acetaminophen 325 mG 1 Tablet(s) Oral every 6 hours PRN Moderate Pain (4 - 6)    HOME MEDICATIONS:  hydroCHLOROthiazide 12.5 mg oral capsule (12-06)  NIFEdipine 30 mg oral tablet, extended release (12-06)  oxycodone-acetaminophen 5 mg-325 mg oral tablet (12-06)  pantoprazole 40 mg oral delayed release tablet (12-06)  rivaroxaban 20 mg oral tablet (12-06)      REVIEW OF SYSTEMS:  All other review of systems is negative unless indicated above.     PHYSICAL EXAM:  GENERAL: NAD  HEENT: No Swelling  CHEST/LUNG: Good air entry, No wheezing  HEART: RRR, No murmurs  ABDOMEN: Soft, Bowel sounds present  EXTREMITIES:  No clubbing

## 2019-12-21 NOTE — PROGRESS NOTE ADULT - ASSESSMENT
Pt is POD#2 s/p Debridement of abdominal wall    Plan:  - Daily dressing change by nurses, irrigate with saline and pack with 1 inch packing  - Follow up OR culture  - Restart Xarelto today  - Will follow    Will discuss with the Attending

## 2019-12-21 NOTE — PROGRESS NOTE ADULT - ASSESSMENT
68 yo F with PMHx GERD, Obesity, DM II, HTN, AFIB on Xarelto, Hernia s/p Repair, Enterocutaneous Fistula /Abdominal Wall Abscess s/p Excision of Sinus Tract (September 2019) presented from home, recently discharged from [facility] with complaint of bilateral lower extremity weakness and inability to care for herself. Patient currently lives with her son as her own house has too many flights of stairs but her son is unable to care for her as well and desires to return to [facility] when stable. Patient denied fever, chills, nausea, vomiting, sick contacts, recent travel, abdominal pain. She was however diagnosed with C Diff on admission and is scheduled for debridement of anterior abdominal wall sinus tracts.    Assessment & Plan:    1. Abdominal wall sinus Tracts:  s/p recent excision of sinus tract/abscess.  Surgery following: s/p Debridement 12/19.  Continue daily wound dressing/packing. Complete course of IV cefazolin x 14 days    2. C. Diff infection:  PO Vancomycin until Dec 22.   Diarrhea resolving.  On Probiotics.    3. Hypokalemia & Magnesium Deficiency:  Replaced. Monitor levels.    4. Chronic Afib:   Resume Xarelto. Rate controlled off meds.    5. GERD:   Off Protonix. Pepcid if needed.    6. Deconditioning:  STR recommended when stable.    7. HTN:  BP stable on Nifedipine & HCTZ.    8. Obesity:  BMI 36.  Dietary and lifestyle modification.    9. DM II:  Diet controlled.  Follow up A1c.    Prophylaxis: None  Code status: Full code    Progress Note Handoff:  Pending consults: None  Pending Tests: None  Family/Patient discussion: Plan of care discussed with patient. DC to NH on Monday  Disposition: STR when stable.

## 2019-12-21 NOTE — PROGRESS NOTE ADULT - SUBJECTIVE AND OBJECTIVE BOX
HPI:  pt denies abd pain.    PAST MEDICAL & SURGICAL HISTORY:  Gastroesophageal reflux disease  Obesity  Diabetes mellitus  HTN (hypertension)  History of cholecystectomy  H/O hernia repair: 2011 and revised in 2013      Allergies    No Known Allergies      MEDICATIONS  (STANDING):  chlorhexidine 4% Liquid 1 Application(s) Topical <User Schedule>  hydrochlorothiazide 12.5 milliGRAM(s) Oral daily  lactobacillus acidophilus 1 Tablet(s) Oral three times a day with meals  NIFEdipine XL 30 milliGRAM(s) Oral daily  nystatin Powder 1 Application(s) Topical two times a day  pantoprazole    Tablet 40 milliGRAM(s) Oral before breakfast  potassium chloride    Tablet ER 40 milliEquivalent(s) Oral every 4 hours  rivaroxaban 20 milliGRAM(s) Oral with dinner  vancomycin    Solution 250 milliGRAM(s) Oral every 6 hours    MEDICATIONS  (PRN):  oxycodone    5 mG/acetaminophen 325 mG 2 Tablet(s) Oral every 4 hours PRN Severe Pain (7 - 10)  oxycodone    5 mG/acetaminophen 325 mG 1 Tablet(s) Oral every 6 hours PRN Moderate Pain (4 - 6)      ROS:  General:	no fever, weight loss,  chills  Respiratory and Thorax: no cough, wheeze,  sob  Cardiovascular:	no chest pain, palpitations, dizziness  Gastrointestinal:	no nausea, vomiting, diarrhea, abd pain  Genitourinary:	no dysuria, hematuria          Vital Signs Last 24 Hrs  T(F): 96.6 (21 Dec 2019 05:57), Max: 98.3 (20 Dec 2019 14:28)  HR: 50 (21 Dec 2019 05:57) (50 - 54)  BP: 111/57 (21 Dec 2019 05:57) (111/57 - 137/63)  RR: 16 (21 Dec 2019 05:57) (16 - 16)      PHYSICAL EXAM:      Constitutional: A&Ox4  Respiratory: cta b/l  Cardiovascular: s1 s2 rrr  Gastrointestinal: soft nt  nd + bs no rebound or guarding  Genitourinary: no cva tenderness  Extremities: normal rom, no edema, calf tenderness  Skin: dressings c/d/i                            11.8   9.81  )-----------( 139      ( 21 Dec 2019 08:04 )             36.2       12-21    139  |  103  |  19  ----------------------------<  96  3.2<L>   |  22  |  0.7    Ca    10.2<H>      21 Dec 2019 08:04  Mg     1.6     12-20    TPro  5.5<L>  /  Alb  3.5  /  TBili  1.4<H>  /  DBili  x   /  AST  52<H>  /  ALT  46<H>  /  AlkPhos  191<H>  12-21        Culture - Body Fluid with Gram Stain (12.19.19 @ 14:26)    Gram Stain:   No polymorphonuclear cells seen  No organisms seen  by cytocentrifuge    Specimen Source: Abdominal Fl abdominal wall    Culture Results:   No growth

## 2019-12-22 LAB
ANION GAP SERPL CALC-SCNC: 10 MMOL/L — SIGNIFICANT CHANGE UP (ref 7–14)
BUN SERPL-MCNC: 16 MG/DL — SIGNIFICANT CHANGE UP (ref 10–20)
CALCIUM SERPL-MCNC: 11.1 MG/DL — HIGH (ref 8.5–10.1)
CHLORIDE SERPL-SCNC: 104 MMOL/L — SIGNIFICANT CHANGE UP (ref 98–110)
CO2 SERPL-SCNC: 26 MMOL/L — SIGNIFICANT CHANGE UP (ref 17–32)
CREAT SERPL-MCNC: 0.8 MG/DL — SIGNIFICANT CHANGE UP (ref 0.7–1.5)
GLUCOSE SERPL-MCNC: 99 MG/DL — SIGNIFICANT CHANGE UP (ref 70–99)
HCT VFR BLD CALC: 38.3 % — SIGNIFICANT CHANGE UP (ref 37–47)
HGB BLD-MCNC: 12.3 G/DL — SIGNIFICANT CHANGE UP (ref 12–16)
MAGNESIUM SERPL-MCNC: 1.5 MG/DL — LOW (ref 1.8–2.4)
MCHC RBC-ENTMCNC: 27.9 PG — SIGNIFICANT CHANGE UP (ref 27–31)
MCHC RBC-ENTMCNC: 32.1 G/DL — SIGNIFICANT CHANGE UP (ref 32–37)
MCV RBC AUTO: 86.8 FL — SIGNIFICANT CHANGE UP (ref 81–99)
NRBC # BLD: 0 /100 WBCS — SIGNIFICANT CHANGE UP (ref 0–0)
PLATELET # BLD AUTO: 210 K/UL — SIGNIFICANT CHANGE UP (ref 130–400)
POTASSIUM SERPL-MCNC: 3.4 MMOL/L — LOW (ref 3.5–5)
POTASSIUM SERPL-SCNC: 3.4 MMOL/L — LOW (ref 3.5–5)
RBC # BLD: 4.41 M/UL — SIGNIFICANT CHANGE UP (ref 4.2–5.4)
RBC # FLD: 13.6 % — SIGNIFICANT CHANGE UP (ref 11.5–14.5)
SODIUM SERPL-SCNC: 140 MMOL/L — SIGNIFICANT CHANGE UP (ref 135–146)
WBC # BLD: 10.51 K/UL — SIGNIFICANT CHANGE UP (ref 4.8–10.8)
WBC # FLD AUTO: 10.51 K/UL — SIGNIFICANT CHANGE UP (ref 4.8–10.8)

## 2019-12-22 PROCEDURE — 99233 SBSQ HOSP IP/OBS HIGH 50: CPT

## 2019-12-22 RX ORDER — METHYLPREDNISOLONE 4 MG
500 TABLET ORAL EVERY 8 HOURS
Refills: 0 | Status: DISCONTINUED | OUTPATIENT
Start: 2019-12-22 | End: 2019-12-23

## 2019-12-22 RX ORDER — MAGNESIUM SULFATE 500 MG/ML
2 VIAL (ML) INJECTION ONCE
Refills: 0 | Status: COMPLETED | OUTPATIENT
Start: 2019-12-22 | End: 2019-12-22

## 2019-12-22 RX ORDER — POTASSIUM CHLORIDE 20 MEQ
40 PACKET (EA) ORAL EVERY 4 HOURS
Refills: 0 | Status: COMPLETED | OUTPATIENT
Start: 2019-12-22 | End: 2019-12-22

## 2019-12-22 RX ADMIN — OXYCODONE AND ACETAMINOPHEN 2 TABLET(S): 5; 325 TABLET ORAL at 22:00

## 2019-12-22 RX ADMIN — Medication 250 MILLIGRAM(S): at 06:37

## 2019-12-22 RX ADMIN — OXYCODONE AND ACETAMINOPHEN 2 TABLET(S): 5; 325 TABLET ORAL at 12:16

## 2019-12-22 RX ADMIN — OXYCODONE AND ACETAMINOPHEN 2 TABLET(S): 5; 325 TABLET ORAL at 07:53

## 2019-12-22 RX ADMIN — Medication 1 TABLET(S): at 12:15

## 2019-12-22 RX ADMIN — Medication 250 MILLIGRAM(S): at 17:24

## 2019-12-22 RX ADMIN — OXYCODONE AND ACETAMINOPHEN 2 TABLET(S): 5; 325 TABLET ORAL at 16:18

## 2019-12-22 RX ADMIN — Medication 250 MILLIGRAM(S): at 13:26

## 2019-12-22 RX ADMIN — Medication 12.5 MILLIGRAM(S): at 06:38

## 2019-12-22 RX ADMIN — RIVAROXABAN 20 MILLIGRAM(S): KIT at 17:23

## 2019-12-22 RX ADMIN — NYSTATIN CREAM 1 APPLICATION(S): 100000 CREAM TOPICAL at 17:24

## 2019-12-22 RX ADMIN — OXYCODONE AND ACETAMINOPHEN 2 TABLET(S): 5; 325 TABLET ORAL at 03:22

## 2019-12-22 RX ADMIN — OXYCODONE AND ACETAMINOPHEN 2 TABLET(S): 5; 325 TABLET ORAL at 12:46

## 2019-12-22 RX ADMIN — Medication 40 MILLIEQUIVALENT(S): at 21:04

## 2019-12-22 RX ADMIN — Medication 500 MILLIGRAM(S): at 21:58

## 2019-12-22 RX ADMIN — Medication 1 TABLET(S): at 17:23

## 2019-12-22 RX ADMIN — Medication 40 MILLIEQUIVALENT(S): at 17:23

## 2019-12-22 RX ADMIN — PANTOPRAZOLE SODIUM 40 MILLIGRAM(S): 20 TABLET, DELAYED RELEASE ORAL at 06:38

## 2019-12-22 RX ADMIN — Medication 1 TABLET(S): at 08:20

## 2019-12-22 RX ADMIN — CHLORHEXIDINE GLUCONATE 1 APPLICATION(S): 213 SOLUTION TOPICAL at 06:37

## 2019-12-22 RX ADMIN — OXYCODONE AND ACETAMINOPHEN 2 TABLET(S): 5; 325 TABLET ORAL at 21:04

## 2019-12-22 RX ADMIN — OXYCODONE AND ACETAMINOPHEN 2 TABLET(S): 5; 325 TABLET ORAL at 07:23

## 2019-12-22 RX ADMIN — Medication 40 MILLIEQUIVALENT(S): at 15:03

## 2019-12-22 NOTE — PROGRESS NOTE ADULT - SUBJECTIVE AND OBJECTIVE BOX
HPI:  Pt c/o pain at abscess cavity.    PAST MEDICAL & SURGICAL HISTORY:  Gastroesophageal reflux disease  Obesity  Diabetes mellitus  HTN (hypertension)  History of cholecystectomy  H/O hernia repair: 2011 and revised in 2013      Allergies    No Known Allergies      MEDICATIONS  (STANDING):  chlorhexidine 4% Liquid 1 Application(s) Topical <User Schedule>  hydrochlorothiazide 12.5 milliGRAM(s) Oral daily  lactobacillus acidophilus 1 Tablet(s) Oral three times a day with meals  NIFEdipine XL 30 milliGRAM(s) Oral daily  nystatin Powder 1 Application(s) Topical two times a day  pantoprazole    Tablet 40 milliGRAM(s) Oral before breakfast  rivaroxaban 20 milliGRAM(s) Oral with dinner  vancomycin    Solution 250 milliGRAM(s) Oral every 6 hours    MEDICATIONS  (PRN):  oxycodone    5 mG/acetaminophen 325 mG 2 Tablet(s) Oral every 4 hours PRN Severe Pain (7 - 10)  oxycodone    5 mG/acetaminophen 325 mG 1 Tablet(s) Oral every 6 hours PRN Moderate Pain (4 - 6)    ROS:  General:	no fever, weight loss,  chills  Respiratory and Thorax: no cough, wheeze,  sob  Cardiovascular:	no chest pain, palpitations, dizziness  Gastrointestinal:	no nausea, vomiting, diarrhea, abd pain  Genitourinary:	no dysuria, hematuria        Vital Signs Last 24 Hrs  T(F): 96.6 (22 Dec 2019 05:00), Max: 97.6 (21 Dec 2019 22:16)  HR: 50 (22 Dec 2019 05:00) (48 - 50)  BP: 112/61 (22 Dec 2019 05:00) (112/61 - 136/56)  RR: 16 (22 Dec 2019 05:00) (15 - 16)  SpO2: 99% (21 Dec 2019 13:29) (99% - 99%)    PHYSICAL EXAM:      Constitutional: A&Ox4  Respiratory: cta b/l  Cardiovascular: s1 s2 rrr  Gastrointestinal: soft nt  nd + bs no rebound or guarding, abscess cavity clean  Genitourinary: no cva tenderness  Extremities: normal rom, no edema, calf tenderness    Culture - Body Fluid with Gram Stain (12.19.19 @ 14:26)    Gram Stain:   No polymorphonuclear cells seen  No organisms seen  by cytocentrifuge    Specimen Source: Abdominal Fl abdominal wall    Culture Results:   Few Corynebacterium species "Susceptibilities not performed"

## 2019-12-22 NOTE — PROGRESS NOTE ADULT - ASSESSMENT
70 yo F with PMHx GERD, Obesity, DM II, HTN, AFIB on Xarelto, Hernia s/p Repair, Enterocutaneous Fistula /Abdominal Wall Abscess s/p Excision of Sinus Tract (September 2019) presented from home, recently discharged from [facility] with complaint of bilateral lower extremity weakness and inability to care for herself. Patient currently lives with her son as her own house has too many flights of stairs but her son is unable to care for her as well and desires to return to [facility] when stable. Patient denied fever, chills, nausea, vomiting, sick contacts, recent travel, abdominal pain. She was however diagnosed with C Diff on admission and is scheduled for debridement of anterior abdominal wall sinus tracts.    Assessment & Plan:    1. Abdominal wall sinus Tracts:  s/p recent excision of sinus tract/abscess.  Surgery following: s/p Debridement 12/19.  Continue daily wound dressing/packing. Complete course of IV cefazolin x 14 days  DC planning    2. C. Diff infection:  PO Vancomycin until Dec 22 (14 day course)  Diarrhea resolved  On Probiotics.    3. Hypokalemia & Magnesium Deficiency:  Replaced. Monitor levels.    4. Chronic Afib:   Resume Xarelto. Rate controlled off meds.    5. GERD:   Off Protonix. Pepcid if needed.    6. Deconditioning:  STR recommended when stable.    7. HTN:  BP stable on Nifedipine & HCTZ.    8. Obesity:  BMI 36.  Dietary and lifestyle modification.    9. DM II:  Diet controlled.  Follow up A1c.    Prophylaxis: None  Code status: Full code    Progress Note Handoff:  Pending consults: None  Pending Tests: None  Family/Patient discussion: Plan of care discussed with patient. DC to NH on Monday  Disposition: STR when stable.

## 2019-12-22 NOTE — PROGRESS NOTE ADULT - SUBJECTIVE AND OBJECTIVE BOX
DARCIEPATRICIA  69y, Female  Allergy: No Known Allergies    Hospital Day: 15d    Patient seen and examined earlier today. No acute events overnight.    PMH/PSH:  PAST MEDICAL & SURGICAL HISTORY:  Gastroesophageal reflux disease  Obesity  Diabetes mellitus  HTN (hypertension)  History of cholecystectomy  H/O hernia repair: 2011 and revised in 2013    VITALS:  T(F): 96.6 (12-22-19 @ 05:00), Max: 97.6 (12-21-19 @ 22:16)  HR: 50 (12-22-19 @ 05:00)  BP: 112/61 (12-22-19 @ 05:00) (112/61 - 136/56)  RR: 16 (12-22-19 @ 05:00)  SpO2: 99% (12-21-19 @ 13:29)    TESTS & MEASUREMENTS:  Weight (Kg):   BMI (kg/m2): 36.1 (12-19)    12-20-19 @ 07:01  -  12-21-19 @ 07:00  --------------------------------------------------------  IN: 360 mL / OUT: 1300 mL / NET: -940 mL    12-21-19 @ 07:01  -  12-22-19 @ 07:00  --------------------------------------------------------  IN: 0 mL / OUT: 300 mL / NET: -300 mL                          12.3   10.51 )-----------( x        ( 22 Dec 2019 09:55 )             38.3     12-22    140  |  104  |  16  ----------------------------<  99  3.4<L>   |  26  |  0.8    Ca    11.1<H>      22 Dec 2019 09:55  Mg     1.5     12-22    TPro  5.5<L>  /  Alb  3.5  /  TBili  1.4<H>  /  DBili  x   /  AST  52<H>  /  ALT  46<H>  /  AlkPhos  191<H>  12-21    LIVER FUNCTIONS - ( 21 Dec 2019 08:04 )  Alb: 3.5 g/dL / Pro: 5.5 g/dL / ALK PHOS: 191 U/L / ALT: 46 U/L / AST: 52 U/L / GGT: x           Culture - Body Fluid with Gram Stain (collected 12-19-19 @ 14:26)  Source: Abdominal Fl abdominal wall  Gram Stain (12-20-19 @ 01:58):    No polymorphonuclear cells seen    No organisms seen    by cytocentrifuge  Preliminary Report (12-21-19 @ 20:47):    Few Corynebacterium species "Susceptibilities not performed"    MEDICATIONS:  MEDICATIONS  (STANDING):  chlorhexidine 4% Liquid 1 Application(s) Topical <User Schedule>  hydrochlorothiazide 12.5 milliGRAM(s) Oral daily  lactobacillus acidophilus 1 Tablet(s) Oral three times a day with meals  magnesium sulfate  IVPB 2 Gram(s) IV Intermittent once  NIFEdipine XL 30 milliGRAM(s) Oral daily  nystatin Powder 1 Application(s) Topical two times a day  pantoprazole    Tablet 40 milliGRAM(s) Oral before breakfast  potassium chloride   Powder 40 milliEquivalent(s) Oral every 4 hours  rivaroxaban 20 milliGRAM(s) Oral with dinner  vancomycin    Solution 250 milliGRAM(s) Oral every 6 hours    MEDICATIONS  (PRN):  oxycodone    5 mG/acetaminophen 325 mG 2 Tablet(s) Oral every 4 hours PRN Severe Pain (7 - 10)  oxycodone    5 mG/acetaminophen 325 mG 1 Tablet(s) Oral every 6 hours PRN Moderate Pain (4 - 6)    HOME MEDICATIONS:  hydroCHLOROthiazide 12.5 mg oral capsule (12-06)  NIFEdipine 30 mg oral tablet, extended release (12-06)  oxycodone-acetaminophen 5 mg-325 mg oral tablet (12-06)  pantoprazole 40 mg oral delayed release tablet (12-06)  rivaroxaban 20 mg oral tablet (12-06)      REVIEW OF SYSTEMS:  All other review of systems is negative unless indicated above.     PHYSICAL EXAM:  GENERAL: NAD  HEENT: No Swelling  CHEST/LUNG: Good air entry, No wheezing  HEART: RRR, No murmurs  ABDOMEN: Soft, Bowel sounds present  EXTREMITIES:  No clubbing

## 2019-12-22 NOTE — PROGRESS NOTE ADULT - ASSESSMENT
Pt is POD#3 s/p Debridement of abdominal wall    Plan:  - Daily dressing change by nurses, irrigate with saline and pack with 1 inch packing  - Follow up OR culture  - Restart Xarelto today  - Will follow    Will discuss with the Attending

## 2019-12-22 NOTE — PROVIDER CONTACT NOTE (OTHER) - REASON
notify provider to confirm if patient is still receiving fingerstick due to PMH of Diabetes. Provider confirm that patient is not receiving fingerstick and her A1C is within normal range.

## 2019-12-23 ENCOUNTER — TRANSCRIPTION ENCOUNTER (OUTPATIENT)
Age: 69
End: 2019-12-23

## 2019-12-23 VITALS
TEMPERATURE: 97 F | HEART RATE: 63 BPM | RESPIRATION RATE: 16 BRPM | SYSTOLIC BLOOD PRESSURE: 146 MMHG | DIASTOLIC BLOOD PRESSURE: 70 MMHG

## 2019-12-23 LAB
ANION GAP SERPL CALC-SCNC: 13 MMOL/L — SIGNIFICANT CHANGE UP (ref 7–14)
BUN SERPL-MCNC: 17 MG/DL — SIGNIFICANT CHANGE UP (ref 10–20)
CALCIUM SERPL-MCNC: 11.1 MG/DL — HIGH (ref 8.5–10.1)
CHLORIDE SERPL-SCNC: 104 MMOL/L — SIGNIFICANT CHANGE UP (ref 98–110)
CO2 SERPL-SCNC: 22 MMOL/L — SIGNIFICANT CHANGE UP (ref 17–32)
CREAT SERPL-MCNC: 0.6 MG/DL — LOW (ref 0.7–1.5)
GLUCOSE SERPL-MCNC: 111 MG/DL — HIGH (ref 70–99)
HCT VFR BLD CALC: 38.7 % — SIGNIFICANT CHANGE UP (ref 37–47)
HGB BLD-MCNC: 12.5 G/DL — SIGNIFICANT CHANGE UP (ref 12–16)
MCHC RBC-ENTMCNC: 27.6 PG — SIGNIFICANT CHANGE UP (ref 27–31)
MCHC RBC-ENTMCNC: 32.3 G/DL — SIGNIFICANT CHANGE UP (ref 32–37)
MCV RBC AUTO: 85.4 FL — SIGNIFICANT CHANGE UP (ref 81–99)
NRBC # BLD: 0 /100 WBCS — SIGNIFICANT CHANGE UP (ref 0–0)
PLATELET # BLD AUTO: 204 K/UL — SIGNIFICANT CHANGE UP (ref 130–400)
POTASSIUM SERPL-MCNC: 3.8 MMOL/L — SIGNIFICANT CHANGE UP (ref 3.5–5)
POTASSIUM SERPL-SCNC: 3.8 MMOL/L — SIGNIFICANT CHANGE UP (ref 3.5–5)
RBC # BLD: 4.53 M/UL — SIGNIFICANT CHANGE UP (ref 4.2–5.4)
RBC # FLD: 13.8 % — SIGNIFICANT CHANGE UP (ref 11.5–14.5)
SODIUM SERPL-SCNC: 139 MMOL/L — SIGNIFICANT CHANGE UP (ref 135–146)
SURGICAL PATHOLOGY STUDY: SIGNIFICANT CHANGE UP
WBC # BLD: 11.38 K/UL — HIGH (ref 4.8–10.8)
WBC # FLD AUTO: 11.38 K/UL — HIGH (ref 4.8–10.8)

## 2019-12-23 PROCEDURE — 99238 HOSP IP/OBS DSCHRG MGMT 30/<: CPT

## 2019-12-23 RX ORDER — NYSTATIN CREAM 100000 [USP'U]/G
1 CREAM TOPICAL
Qty: 0 | Refills: 0 | DISCHARGE
Start: 2019-12-23

## 2019-12-23 RX ORDER — METHYLPREDNISOLONE 4 MG
1 TABLET ORAL
Qty: 0 | Refills: 0 | DISCHARGE
Start: 2019-12-23 | End: 2019-12-26

## 2019-12-23 RX ADMIN — OXYCODONE AND ACETAMINOPHEN 2 TABLET(S): 5; 325 TABLET ORAL at 01:54

## 2019-12-23 RX ADMIN — Medication 1 TABLET(S): at 08:53

## 2019-12-23 RX ADMIN — NYSTATIN CREAM 1 APPLICATION(S): 100000 CREAM TOPICAL at 17:54

## 2019-12-23 RX ADMIN — OXYCODONE AND ACETAMINOPHEN 2 TABLET(S): 5; 325 TABLET ORAL at 18:53

## 2019-12-23 RX ADMIN — Medication 1 TABLET(S): at 12:08

## 2019-12-23 RX ADMIN — OXYCODONE AND ACETAMINOPHEN 2 TABLET(S): 5; 325 TABLET ORAL at 06:15

## 2019-12-23 RX ADMIN — Medication 12.5 MILLIGRAM(S): at 05:53

## 2019-12-23 RX ADMIN — OXYCODONE AND ACETAMINOPHEN 2 TABLET(S): 5; 325 TABLET ORAL at 10:35

## 2019-12-23 RX ADMIN — OXYCODONE AND ACETAMINOPHEN 2 TABLET(S): 5; 325 TABLET ORAL at 06:05

## 2019-12-23 RX ADMIN — Medication 250 MILLIGRAM(S): at 05:54

## 2019-12-23 RX ADMIN — PANTOPRAZOLE SODIUM 40 MILLIGRAM(S): 20 TABLET, DELAYED RELEASE ORAL at 05:53

## 2019-12-23 RX ADMIN — Medication 30 MILLIGRAM(S): at 05:57

## 2019-12-23 RX ADMIN — RIVAROXABAN 20 MILLIGRAM(S): KIT at 17:53

## 2019-12-23 RX ADMIN — Medication 500 MILLIGRAM(S): at 14:24

## 2019-12-23 RX ADMIN — OXYCODONE AND ACETAMINOPHEN 2 TABLET(S): 5; 325 TABLET ORAL at 02:15

## 2019-12-23 RX ADMIN — Medication 250 MILLIGRAM(S): at 00:02

## 2019-12-23 RX ADMIN — NYSTATIN CREAM 1 APPLICATION(S): 100000 CREAM TOPICAL at 05:56

## 2019-12-23 RX ADMIN — Medication 500 MILLIGRAM(S): at 05:53

## 2019-12-23 RX ADMIN — OXYCODONE AND ACETAMINOPHEN 2 TABLET(S): 5; 325 TABLET ORAL at 10:05

## 2019-12-23 RX ADMIN — CHLORHEXIDINE GLUCONATE 1 APPLICATION(S): 213 SOLUTION TOPICAL at 05:56

## 2019-12-23 RX ADMIN — OXYCODONE AND ACETAMINOPHEN 2 TABLET(S): 5; 325 TABLET ORAL at 14:25

## 2019-12-23 RX ADMIN — OXYCODONE AND ACETAMINOPHEN 2 TABLET(S): 5; 325 TABLET ORAL at 14:55

## 2019-12-23 RX ADMIN — Medication 1 TABLET(S): at 17:55

## 2019-12-23 NOTE — PROGRESS NOTE ADULT - ASSESSMENT
70 yo F with PMHx GERD, Obesity, DM II, HTN, AFIB on Xarelto, Hernia s/p Repair, Enterocutaneous Fistula /Abdominal Wall Abscess s/p Excision of Sinus Tract (September 2019) presented from home, recently discharged from [facility] with complaint of bilateral lower extremity weakness and inability to care for herself. Patient currently lives with her son as her own house has too many flights of stairs but her son is unable to care for her as well and desires to return to [facility] when stable. Patient denied fever, chills, nausea, vomiting, sick contacts, recent travel, abdominal pain. She was however diagnosed with C Diff on admission and is scheduled for debridement of anterior abdominal wall sinus tracts.    Assessment & Plan:    1. Abdominal wall sinus Tracts:  s/p recent excision of sinus tract/abscess.  Surgery following: s/p Debridement 12/19.  Continue daily wound dressing/packing. Complete course of IV cefazolin x 14 days  DC planning    2. C. Diff infection:  PO Vancomycin until Dec 22 (14 day course)  Diarrhea resolved  On Probiotics.    3. Hypokalemia & Magnesium Deficiency:  Replaced. Monitor levels.    4. Chronic Afib:   Resume Xarelto. Rate controlled off meds.    5. GERD:   Off Protonix. Pepcid if needed.    6. Deconditioning:  STR recommended when stable.    7. HTN:  BP stable on Nifedipine & HCTZ.    8. Obesity:  BMI 36.  Dietary and lifestyle modification.    9. DM II:  Diet controlled.  Follow up A1c.    Prophylaxis: None  Code status: Full code    Progress Note Handoff:  Pending consults: None  Pending Tests: None  Family/Patient discussion: Plan of care discussed with patient. DC to NH  Disposition: STR when stable.

## 2019-12-23 NOTE — PROGRESS NOTE ADULT - SUBJECTIVE AND OBJECTIVE BOX
DARCIEPATRICIA  69y, Female  Allergy: No Known Allergies    Hospital Day: 16d    Patient seen and examined earlier today. No acute events overnight.    PMH/PSH:  PAST MEDICAL & SURGICAL HISTORY:  Gastroesophageal reflux disease  Obesity  Diabetes mellitus  HTN (hypertension)  History of cholecystectomy  H/O hernia repair: 2011 and revised in 2013    VITALS:  T(F): 98 (12-23-19 @ 14:10), Max: 98.3 (12-22-19 @ 22:09)  HR: 62 (12-23-19 @ 14:10)  BP: 132/61 (12-23-19 @ 14:10) (130/63 - 133/65)  RR: 18 (12-23-19 @ 14:10)  SpO2: --    TESTS & MEASUREMENTS:  Weight (Kg):   BMI (kg/m2): 36.1 (12-19)    12-21-19 @ 07:01  -  12-22-19 @ 07:00  --------------------------------------------------------  IN: 0 mL / OUT: 300 mL / NET: -300 mL    12-22-19 @ 07:01  -  12-23-19 @ 07:00  --------------------------------------------------------  IN: 0 mL / OUT: 700 mL / NET: -700 mL                        12.5   11.38 )-----------( 204      ( 23 Dec 2019 11:32 )             38.7     12-23    139  |  104  |  17  ----------------------------<  111<H>  3.8   |  22  |  0.6<L>    Ca    11.1<H>      23 Dec 2019 11:32  Mg     1.5     12-22    Culture - Body Fluid with Gram Stain (collected 12-19-19 @ 14:26)  Source: Abdominal Fl abdominal wall  Gram Stain (12-20-19 @ 01:58):    No polymorphonuclear cells seen    No organisms seen    by cytocentrifuge  Preliminary Report (12-21-19 @ 20:47):    Few Corynebacterium species "Susceptibilities not performed"      RECENT DIAGNOSTIC ORDERS:  Magnesium, Serum: AM Sched. Collection: 24-Dec-2019 04:30 (12-22-19 @ 20:51)    MEDICATIONS:  MEDICATIONS  (STANDING):  chlorhexidine 4% Liquid 1 Application(s) Topical <User Schedule>  hydrochlorothiazide 12.5 milliGRAM(s) Oral daily  lactobacillus acidophilus 1 Tablet(s) Oral three times a day with meals  magnesium gluconate 500 milliGRAM(s) Oral every 8 hours  NIFEdipine XL 30 milliGRAM(s) Oral daily  nystatin Powder 1 Application(s) Topical two times a day  pantoprazole    Tablet 40 milliGRAM(s) Oral before breakfast  rivaroxaban 20 milliGRAM(s) Oral with dinner    MEDICATIONS  (PRN):  oxycodone    5 mG/acetaminophen 325 mG 2 Tablet(s) Oral every 4 hours PRN Severe Pain (7 - 10)  oxycodone    5 mG/acetaminophen 325 mG 1 Tablet(s) Oral every 6 hours PRN Moderate Pain (4 - 6)    HOME MEDICATIONS:  hydroCHLOROthiazide 12.5 mg oral capsule (12-06)  Mag-G 500 mg oral tablet (12-23)  NIFEdipine 30 mg oral tablet, extended release (12-06)  nystatin 100,000 units/g topical powder (12-23)  oxycodone-acetaminophen 5 mg-325 mg oral tablet (12-06)  pantoprazole 40 mg oral delayed release tablet (12-06)  rivaroxaban 20 mg oral tablet (12-06)    REVIEW OF SYSTEMS:  All other review of systems is negative unless indicated above.     PHYSICAL EXAM:  GENERAL: NAD  HEENT: No Swelling  CHEST/LUNG: Good air entry, No wheezing  HEART: RRR, No murmurs  ABDOMEN: Soft, Bowel sounds present, Abdominal wall cavity in dressing  EXTREMITIES:  No clubbing

## 2019-12-23 NOTE — PROGRESS NOTE ADULT - ASSESSMENT
Pt is POD#4 s/p debridement of abdominal wall.    Plan:  -Daily dressing change by nurses- irrigate with saline and pack with 1 inch packing  - F/u final cultures      Will d/w the Attending

## 2019-12-23 NOTE — PROGRESS NOTE ADULT - SUBJECTIVE AND OBJECTIVE BOX
Patient seen & examined in the morning.  No acute events noted overnight.  Patient reports pain at the abscess cavity which is getting better. Pt tolerates diet, BM last night.        I&O's Detail    22 Dec 2019 07:01  -  23 Dec 2019 07:00  --------------------------------------------------------  IN:  Total IN: 0 mL    OUT:    Voided: 700 mL  Total OUT: 700 mL    Total NET: -700 mL              MEDICATIONS  (STANDING):  chlorhexidine 4% Liquid 1 Application(s) Topical <User Schedule>  hydrochlorothiazide 12.5 milliGRAM(s) Oral daily  lactobacillus acidophilus 1 Tablet(s) Oral three times a day with meals  magnesium gluconate 500 milliGRAM(s) Oral every 8 hours  NIFEdipine XL 30 milliGRAM(s) Oral daily  nystatin Powder 1 Application(s) Topical two times a day  pantoprazole    Tablet 40 milliGRAM(s) Oral before breakfast  rivaroxaban 20 milliGRAM(s) Oral with dinner  vancomycin    Solution 250 milliGRAM(s) Oral every 6 hours    MEDICATIONS  (PRN):  oxycodone    5 mG/acetaminophen 325 mG 2 Tablet(s) Oral every 4 hours PRN Severe Pain (7 - 10)  oxycodone    5 mG/acetaminophen 325 mG 1 Tablet(s) Oral every 6 hours PRN Moderate Pain (4 - 6)          Vital Signs Last 24 Hrs  T(C): 36.7 (23 Dec 2019 05:00), Max: 36.8 (22 Dec 2019 22:09)  T(F): 98.1 (23 Dec 2019 05:00), Max: 98.3 (22 Dec 2019 22:09)  HR: 66 (23 Dec 2019 05:00) (52 - 66)  BP: 130/63 (23 Dec 2019 05:00) (130/63 - 156/67)  RR: 18 (23 Dec 2019 05:00) (16 - 18)        Physical Exam:  General: NAD  Abdomen:  + Bowel sounds, soft, no distention, non-tender,  no rebound/guarding or peritoneal signs.    Skin: Dressing C/D/I.      LABS:                        12.3   10.51 )-----------( 210      ( 22 Dec 2019 09:55 )             38.3     12-22    140  |  104  |  16  ----------------------------<  99  3.4<L>   |  26  |  0.8    Ca    11.1<H>      22 Dec 2019 09:55  Mg     1.5     12-22        Culture - Body Fluid with Gram Stain (12.19.19 @ 14:26)    Gram Stain:   No polymorphonuclear cells seen  No organisms seen  by cytocentrifuge    Specimen Source: Abdominal Fl abdominal wall    Culture Results:   Few Corynebacterium species "Susceptibilities not performed"          RADIOLOGY & ADDITIONAL STUDIES:

## 2019-12-23 NOTE — DISCHARGE NOTE NURSING/CASE MANAGEMENT/SOCIAL WORK - NSDCPEXARELTO_GEN_ALL_CORE
Rivaroxaban/Xarelto - Dietary Advice/Rivaroxaban/Xarelto - Potential for adverse drug reactions and interactions/Rivaroxaban/Xarelto - Compliance/Rivaroxaban/Xarelto - Follow up monitoring

## 2019-12-23 NOTE — DISCHARGE NOTE NURSING/CASE MANAGEMENT/SOCIAL WORK - PATIENT PORTAL LINK FT
You can access the FollowMyHealth Patient Portal offered by [facility] by registering at the following website: [email or web address]. By joining [facility]’s FollowMyHealth portal, you will also be able to view your health information using other applications (apps) compatible with our system.

## 2019-12-23 NOTE — PROGRESS NOTE ADULT - REASON FOR ADMISSION
unable to care fo self, inability to ambulate
C Diff Colitis
C Diff Colitis
unable to care fo self, inability to ambulate

## 2019-12-23 NOTE — CHART NOTE - NSCHARTNOTEFT_GEN_A_CORE
Registered Dietitian Follow-Up     Patient Profile Reviewed                           Yes [x]   No []     Nutrition History Previously Obtained        Yes [x]  No []       Pertinent Subjective Information: Pt reports improvement in appetite. Pt eats her meals a little later than when they arrive, because she is not hungry when she first wakes up in the morning, therefore she wasn't yet hungry for lunch that was at bedside. Reports that she will eat her lunch later on. Pt denies nausea/abdominal pain with meals. No reported chewing/swallowing difficulties at this time. Pt reports that she is no longer having diarrhea. Last BM was today () and it was formed.     Pertinent Medical Interventions: Pt is POD 4 sp debridement of abdominal abscess w daily wound dressing/packing on IV cefazolin x14d. C diff diarrhea is noted as resolving per EMR and per pt as well.       Diet order: DASH/TLC, consistent CHO w/ no snacks, no lactose     Anthropometrics:  - Ht. 66"  - Wt. dosing 101.4 kg/223 lbs vs () 105.3 kg/232 lbs (no new wt since previous assessment)  - BMI 36.1 (using lowest wt this admit 101.4 kg)  - IBW 59.1 kg/130 lbs     Pertinent Lab Data: () Cr 0.6, glucose 111, Ca 11.1; () HbA1c 5.6     Pertinent Meds: magnesium gluconate, protonix, lactobacilus acidophilus, hydrochlorothiazide     Physical Findings:  - Appearance: obese; no edema noted  - GI function: c. diff; diarrhea resolving   - Tubes: NA  - Oral/Mouth cavity: no chewing/swallowing difficulties reported  - Skin: R abdominal wound w dressing     Nutrition Requirements  Weight Used: Dosin.4 kg lowest ABW & IBW: 59 kg (needs continued from initial assessment on )     Estimated Energy Needs    Continue [x]  2828-4915 (MSJ x 1-1.1 AF) Obese BMI considered        Estimated Protein Needs    Continue [x] 59-71 g (1-1.2 g/kg IBW) same as above        Estimated Fluid Needs        Continue [x] 1mL/kcal or per LIP       Nutrient Intake: ~50% (fair)        [x] Previous Nutrition Diagnosis: Inadequate oral intake              [x] Resolved    [x] No active nutrition diagnosis identified at this time       Nutrition Intervention meals and snacks, coordination of care     Goal/Expected Outcome: pt to maintain po intake >50% over the next 7 days     Indicator/Monitoring: RD to monitor diet order, energy intake, body composition, NFPF    Recommend encourage po intake, send meals based on preference as they fit within therapeutic diet order

## 2019-12-24 LAB
CULTURE RESULTS: SIGNIFICANT CHANGE UP
SPECIMEN SOURCE: SIGNIFICANT CHANGE UP

## 2019-12-25 ENCOUNTER — OUTPATIENT (OUTPATIENT)
Dept: OUTPATIENT SERVICES | Facility: HOSPITAL | Age: 69
LOS: 1 days | Discharge: HOME | End: 2019-12-25

## 2019-12-25 DIAGNOSIS — Z90.49 ACQUIRED ABSENCE OF OTHER SPECIFIED PARTS OF DIGESTIVE TRACT: Chronic | ICD-10-CM

## 2019-12-25 DIAGNOSIS — I10 ESSENTIAL (PRIMARY) HYPERTENSION: ICD-10-CM

## 2019-12-25 DIAGNOSIS — Z98.890 OTHER SPECIFIED POSTPROCEDURAL STATES: Chronic | ICD-10-CM

## 2019-12-25 PROBLEM — K21.9 GASTRO-ESOPHAGEAL REFLUX DISEASE WITHOUT ESOPHAGITIS: Chronic | Status: ACTIVE | Noted: 2019-12-06

## 2019-12-27 ENCOUNTER — OUTPATIENT (OUTPATIENT)
Dept: OUTPATIENT SERVICES | Facility: HOSPITAL | Age: 69
LOS: 1 days | Discharge: HOME | End: 2019-12-27

## 2019-12-27 DIAGNOSIS — Z98.890 OTHER SPECIFIED POSTPROCEDURAL STATES: Chronic | ICD-10-CM

## 2019-12-27 DIAGNOSIS — A04.71 ENTEROCOLITIS DUE TO CLOSTRIDIUM DIFFICILE, RECURRENT: ICD-10-CM

## 2019-12-27 DIAGNOSIS — Z90.49 ACQUIRED ABSENCE OF OTHER SPECIFIED PARTS OF DIGESTIVE TRACT: Chronic | ICD-10-CM

## 2019-12-29 DIAGNOSIS — D64.9 ANEMIA, UNSPECIFIED: ICD-10-CM

## 2019-12-29 DIAGNOSIS — Z90.49 ACQUIRED ABSENCE OF OTHER SPECIFIED PARTS OF DIGESTIVE TRACT: ICD-10-CM

## 2019-12-29 DIAGNOSIS — I10 ESSENTIAL (PRIMARY) HYPERTENSION: ICD-10-CM

## 2019-12-29 DIAGNOSIS — K21.9 GASTRO-ESOPHAGEAL REFLUX DISEASE WITHOUT ESOPHAGITIS: ICD-10-CM

## 2019-12-29 DIAGNOSIS — E66.9 OBESITY, UNSPECIFIED: ICD-10-CM

## 2019-12-29 DIAGNOSIS — E87.6 HYPOKALEMIA: ICD-10-CM

## 2019-12-29 DIAGNOSIS — I48.20 CHRONIC ATRIAL FIBRILLATION, UNSPECIFIED: ICD-10-CM

## 2019-12-29 DIAGNOSIS — L03.311 CELLULITIS OF ABDOMINAL WALL: ICD-10-CM

## 2019-12-29 DIAGNOSIS — E61.2 MAGNESIUM DEFICIENCY: ICD-10-CM

## 2019-12-29 DIAGNOSIS — E11.9 TYPE 2 DIABETES MELLITUS WITHOUT COMPLICATIONS: ICD-10-CM

## 2019-12-29 DIAGNOSIS — A04.72 ENTEROCOLITIS DUE TO CLOSTRIDIUM DIFFICILE, NOT SPECIFIED AS RECURRENT: ICD-10-CM

## 2019-12-29 DIAGNOSIS — D69.6 THROMBOCYTOPENIA, UNSPECIFIED: ICD-10-CM

## 2019-12-29 DIAGNOSIS — Z79.01 LONG TERM (CURRENT) USE OF ANTICOAGULANTS: ICD-10-CM

## 2019-12-30 ENCOUNTER — OUTPATIENT (OUTPATIENT)
Dept: OUTPATIENT SERVICES | Facility: HOSPITAL | Age: 69
LOS: 1 days | Discharge: HOME | End: 2019-12-30

## 2019-12-30 DIAGNOSIS — Z98.890 OTHER SPECIFIED POSTPROCEDURAL STATES: Chronic | ICD-10-CM

## 2019-12-30 DIAGNOSIS — Z90.49 ACQUIRED ABSENCE OF OTHER SPECIFIED PARTS OF DIGESTIVE TRACT: Chronic | ICD-10-CM

## 2019-12-30 DIAGNOSIS — E87.6 HYPOKALEMIA: ICD-10-CM

## 2020-01-22 ENCOUNTER — INPATIENT (INPATIENT)
Facility: HOSPITAL | Age: 70
LOS: 13 days | Discharge: NURSING, REHAB OR LONG-TERM CARE | End: 2020-02-05
Attending: INTERNAL MEDICINE | Admitting: INTERNAL MEDICINE
Payer: COMMERCIAL

## 2020-01-22 VITALS
WEIGHT: 229.94 LBS | HEART RATE: 93 BPM | OXYGEN SATURATION: 99 % | DIASTOLIC BLOOD PRESSURE: 75 MMHG | HEIGHT: 65 IN | TEMPERATURE: 101 F | RESPIRATION RATE: 22 BRPM | SYSTOLIC BLOOD PRESSURE: 176 MMHG

## 2020-01-22 DIAGNOSIS — Z90.49 ACQUIRED ABSENCE OF OTHER SPECIFIED PARTS OF DIGESTIVE TRACT: Chronic | ICD-10-CM

## 2020-01-22 DIAGNOSIS — Z98.890 OTHER SPECIFIED POSTPROCEDURAL STATES: Chronic | ICD-10-CM

## 2020-01-22 PROCEDURE — 99285 EMERGENCY DEPT VISIT HI MDM: CPT

## 2020-01-22 NOTE — ED ADULT TRIAGE NOTE - CHIEF COMPLAINT QUOTE
skin infection on abdomen.  Preveious hernia repair continues to get infected skin infection on abdomen.  Previous hernia repair continues to get infected

## 2020-01-23 DIAGNOSIS — A41.9 SEPSIS, UNSPECIFIED ORGANISM: ICD-10-CM

## 2020-01-23 DIAGNOSIS — T81.49XA INFECTION FOLLOWING A PROCEDURE, OTHER SURGICAL SITE, INITIAL ENCOUNTER: ICD-10-CM

## 2020-01-23 LAB
ALBUMIN SERPL ELPH-MCNC: 3.2 G/DL — LOW (ref 3.5–5.2)
ALBUMIN SERPL ELPH-MCNC: 3.8 G/DL — SIGNIFICANT CHANGE UP (ref 3.5–5.2)
ALP SERPL-CCNC: 105 U/L — SIGNIFICANT CHANGE UP (ref 30–115)
ALP SERPL-CCNC: 118 U/L — HIGH (ref 30–115)
ALT FLD-CCNC: 10 U/L — SIGNIFICANT CHANGE UP (ref 0–41)
ALT FLD-CCNC: 11 U/L — SIGNIFICANT CHANGE UP (ref 0–41)
ANION GAP SERPL CALC-SCNC: 12 MMOL/L — SIGNIFICANT CHANGE UP (ref 7–14)
ANION GAP SERPL CALC-SCNC: 15 MMOL/L — HIGH (ref 7–14)
AST SERPL-CCNC: 11 U/L — SIGNIFICANT CHANGE UP (ref 0–41)
AST SERPL-CCNC: 14 U/L — SIGNIFICANT CHANGE UP (ref 0–41)
BASOPHILS # BLD AUTO: 0.06 K/UL — SIGNIFICANT CHANGE UP (ref 0–0.2)
BASOPHILS NFR BLD AUTO: 0.4 % — SIGNIFICANT CHANGE UP (ref 0–1)
BILIRUB SERPL-MCNC: 1.4 MG/DL — HIGH (ref 0.2–1.2)
BILIRUB SERPL-MCNC: 1.7 MG/DL — HIGH (ref 0.2–1.2)
BUN SERPL-MCNC: 13 MG/DL — SIGNIFICANT CHANGE UP (ref 10–20)
BUN SERPL-MCNC: 8 MG/DL — LOW (ref 10–20)
C DIFF BY PCR RESULT: POSITIVE
C DIFF TOX GENS STL QL NAA+PROBE: SIGNIFICANT CHANGE UP
CALCIUM SERPL-MCNC: 9.8 MG/DL — SIGNIFICANT CHANGE UP (ref 8.5–10.1)
CALCIUM SERPL-MCNC: 9.9 MG/DL — SIGNIFICANT CHANGE UP (ref 8.5–10.1)
CHLORIDE SERPL-SCNC: 104 MMOL/L — SIGNIFICANT CHANGE UP (ref 98–110)
CHLORIDE SERPL-SCNC: 108 MMOL/L — SIGNIFICANT CHANGE UP (ref 98–110)
CO2 SERPL-SCNC: 20 MMOL/L — SIGNIFICANT CHANGE UP (ref 17–32)
CO2 SERPL-SCNC: 23 MMOL/L — SIGNIFICANT CHANGE UP (ref 17–32)
CREAT SERPL-MCNC: 0.5 MG/DL — LOW (ref 0.7–1.5)
CREAT SERPL-MCNC: 0.6 MG/DL — LOW (ref 0.7–1.5)
EOSINOPHIL # BLD AUTO: 0.09 K/UL — SIGNIFICANT CHANGE UP (ref 0–0.7)
EOSINOPHIL NFR BLD AUTO: 0.6 % — SIGNIFICANT CHANGE UP (ref 0–8)
GLUCOSE BLDC GLUCOMTR-MCNC: 86 MG/DL — SIGNIFICANT CHANGE UP (ref 70–99)
GLUCOSE BLDC GLUCOMTR-MCNC: 88 MG/DL — SIGNIFICANT CHANGE UP (ref 70–99)
GLUCOSE BLDC GLUCOMTR-MCNC: 96 MG/DL — SIGNIFICANT CHANGE UP (ref 70–99)
GLUCOSE BLDC GLUCOMTR-MCNC: 99 MG/DL — SIGNIFICANT CHANGE UP (ref 70–99)
GLUCOSE SERPL-MCNC: 113 MG/DL — HIGH (ref 70–99)
GLUCOSE SERPL-MCNC: 94 MG/DL — SIGNIFICANT CHANGE UP (ref 70–99)
HCT VFR BLD CALC: 33.3 % — LOW (ref 37–47)
HCT VFR BLD CALC: 37.3 % — SIGNIFICANT CHANGE UP (ref 37–47)
HGB BLD-MCNC: 10.7 G/DL — LOW (ref 12–16)
HGB BLD-MCNC: 12.3 G/DL — SIGNIFICANT CHANGE UP (ref 12–16)
IMM GRANULOCYTES NFR BLD AUTO: 0.9 % — HIGH (ref 0.1–0.3)
LACTATE SERPL-SCNC: 1.7 MMOL/L — SIGNIFICANT CHANGE UP (ref 0.7–2)
LYMPHOCYTES # BLD AUTO: 1.51 K/UL — SIGNIFICANT CHANGE UP (ref 1.2–3.4)
LYMPHOCYTES # BLD AUTO: 10.6 % — LOW (ref 20.5–51.1)
MAGNESIUM SERPL-MCNC: 1.5 MG/DL — LOW (ref 1.8–2.4)
MCHC RBC-ENTMCNC: 28 PG — SIGNIFICANT CHANGE UP (ref 27–31)
MCHC RBC-ENTMCNC: 28.3 PG — SIGNIFICANT CHANGE UP (ref 27–31)
MCHC RBC-ENTMCNC: 32.1 G/DL — SIGNIFICANT CHANGE UP (ref 32–37)
MCHC RBC-ENTMCNC: 33 G/DL — SIGNIFICANT CHANGE UP (ref 32–37)
MCV RBC AUTO: 85.7 FL — SIGNIFICANT CHANGE UP (ref 81–99)
MCV RBC AUTO: 87.2 FL — SIGNIFICANT CHANGE UP (ref 81–99)
MONOCYTES # BLD AUTO: 1.13 K/UL — HIGH (ref 0.1–0.6)
MONOCYTES NFR BLD AUTO: 7.9 % — SIGNIFICANT CHANGE UP (ref 1.7–9.3)
NEUTROPHILS # BLD AUTO: 11.37 K/UL — HIGH (ref 1.4–6.5)
NEUTROPHILS NFR BLD AUTO: 79.6 % — HIGH (ref 42.2–75.2)
NRBC # BLD: 0 /100 WBCS — SIGNIFICANT CHANGE UP (ref 0–0)
NRBC # BLD: 0 /100 WBCS — SIGNIFICANT CHANGE UP (ref 0–0)
PLATELET # BLD AUTO: 197 K/UL — SIGNIFICANT CHANGE UP (ref 130–400)
PLATELET # BLD AUTO: 235 K/UL — SIGNIFICANT CHANGE UP (ref 130–400)
POTASSIUM SERPL-MCNC: 3.5 MMOL/L — SIGNIFICANT CHANGE UP (ref 3.5–5)
POTASSIUM SERPL-MCNC: 3.9 MMOL/L — SIGNIFICANT CHANGE UP (ref 3.5–5)
POTASSIUM SERPL-SCNC: 3.5 MMOL/L — SIGNIFICANT CHANGE UP (ref 3.5–5)
POTASSIUM SERPL-SCNC: 3.9 MMOL/L — SIGNIFICANT CHANGE UP (ref 3.5–5)
PROT SERPL-MCNC: 5.6 G/DL — LOW (ref 6–8)
PROT SERPL-MCNC: 6.7 G/DL — SIGNIFICANT CHANGE UP (ref 6–8)
RBC # BLD: 3.82 M/UL — LOW (ref 4.2–5.4)
RBC # BLD: 4.35 M/UL — SIGNIFICANT CHANGE UP (ref 4.2–5.4)
RBC # FLD: 13.4 % — SIGNIFICANT CHANGE UP (ref 11.5–14.5)
RBC # FLD: 13.6 % — SIGNIFICANT CHANGE UP (ref 11.5–14.5)
SODIUM SERPL-SCNC: 139 MMOL/L — SIGNIFICANT CHANGE UP (ref 135–146)
SODIUM SERPL-SCNC: 143 MMOL/L — SIGNIFICANT CHANGE UP (ref 135–146)
VANCOMYCIN FLD-MCNC: 7.6 UG/ML — SIGNIFICANT CHANGE UP (ref 5–10)
WBC # BLD: 14.29 K/UL — HIGH (ref 4.8–10.8)
WBC # BLD: 9.97 K/UL — SIGNIFICANT CHANGE UP (ref 4.8–10.8)
WBC # FLD AUTO: 14.29 K/UL — HIGH (ref 4.8–10.8)
WBC # FLD AUTO: 9.97 K/UL — SIGNIFICANT CHANGE UP (ref 4.8–10.8)

## 2020-01-23 PROCEDURE — 99223 1ST HOSP IP/OBS HIGH 75: CPT

## 2020-01-23 PROCEDURE — 74177 CT ABD & PELVIS W/CONTRAST: CPT | Mod: 26

## 2020-01-23 RX ORDER — ACETAMINOPHEN 500 MG
650 TABLET ORAL EVERY 6 HOURS
Refills: 0 | Status: DISCONTINUED | OUTPATIENT
Start: 2020-01-23 | End: 2020-01-27

## 2020-01-23 RX ORDER — MEROPENEM 1 G/30ML
1000 INJECTION INTRAVENOUS EVERY 8 HOURS
Refills: 0 | Status: DISCONTINUED | OUTPATIENT
Start: 2020-01-23 | End: 2020-01-24

## 2020-01-23 RX ORDER — VANCOMYCIN HCL 1 G
1000 VIAL (EA) INTRAVENOUS EVERY 12 HOURS
Refills: 0 | Status: DISCONTINUED | OUTPATIENT
Start: 2020-01-23 | End: 2020-01-24

## 2020-01-23 RX ORDER — COLLAGENASE CLOSTRIDIUM HIST. 250 UNIT/G
1 OINTMENT (GRAM) TOPICAL DAILY
Refills: 0 | Status: COMPLETED | OUTPATIENT
Start: 2020-01-23 | End: 2020-01-26

## 2020-01-23 RX ORDER — RIVAROXABAN 15 MG-20MG
20 KIT ORAL
Refills: 0 | Status: DISCONTINUED | OUTPATIENT
Start: 2020-01-23 | End: 2020-01-24

## 2020-01-23 RX ORDER — SODIUM CHLORIDE 9 MG/ML
1000 INJECTION INTRAMUSCULAR; INTRAVENOUS; SUBCUTANEOUS ONCE
Refills: 0 | Status: COMPLETED | OUTPATIENT
Start: 2020-01-23 | End: 2020-01-23

## 2020-01-23 RX ORDER — METRONIDAZOLE 500 MG
500 TABLET ORAL ONCE
Refills: 0 | Status: COMPLETED | OUTPATIENT
Start: 2020-01-23 | End: 2020-01-23

## 2020-01-23 RX ORDER — CEFEPIME 1 G/1
2000 INJECTION, POWDER, FOR SOLUTION INTRAMUSCULAR; INTRAVENOUS EVERY 8 HOURS
Refills: 0 | Status: DISCONTINUED | OUTPATIENT
Start: 2020-01-23 | End: 2020-01-24

## 2020-01-23 RX ORDER — METHYLPREDNISOLONE 4 MG
500 TABLET ORAL EVERY 8 HOURS
Refills: 0 | Status: DISCONTINUED | OUTPATIENT
Start: 2020-01-23 | End: 2020-01-27

## 2020-01-23 RX ORDER — NIFEDIPINE 30 MG
30 TABLET, EXTENDED RELEASE 24 HR ORAL DAILY
Refills: 0 | Status: DISCONTINUED | OUTPATIENT
Start: 2020-01-23 | End: 2020-01-25

## 2020-01-23 RX ORDER — OXYCODONE AND ACETAMINOPHEN 5; 325 MG/1; MG/1
2 TABLET ORAL EVERY 6 HOURS
Refills: 0 | Status: DISCONTINUED | OUTPATIENT
Start: 2020-01-23 | End: 2020-01-27

## 2020-01-23 RX ORDER — MAGNESIUM SULFATE 500 MG/ML
2 VIAL (ML) INJECTION ONCE
Refills: 0 | Status: COMPLETED | OUTPATIENT
Start: 2020-01-23 | End: 2020-01-23

## 2020-01-23 RX ORDER — VANCOMYCIN HCL 1 G
125 VIAL (EA) INTRAVENOUS EVERY 6 HOURS
Refills: 0 | Status: DISCONTINUED | OUTPATIENT
Start: 2020-01-23 | End: 2020-01-27

## 2020-01-23 RX ORDER — CEFEPIME 1 G/1
2000 INJECTION, POWDER, FOR SOLUTION INTRAMUSCULAR; INTRAVENOUS ONCE
Refills: 0 | Status: COMPLETED | OUTPATIENT
Start: 2020-01-23 | End: 2020-01-23

## 2020-01-23 RX ORDER — METRONIDAZOLE 500 MG
500 TABLET ORAL EVERY 8 HOURS
Refills: 0 | Status: DISCONTINUED | OUTPATIENT
Start: 2020-01-23 | End: 2020-01-24

## 2020-01-23 RX ORDER — LIDOCAINE HCL 20 MG/ML
20 VIAL (ML) INJECTION ONCE
Refills: 0 | Status: COMPLETED | OUTPATIENT
Start: 2020-01-23 | End: 2020-01-23

## 2020-01-23 RX ADMIN — Medication 1 APPLICATION(S): at 09:39

## 2020-01-23 RX ADMIN — MEROPENEM 100 MILLIGRAM(S): 1 INJECTION INTRAVENOUS at 13:22

## 2020-01-23 RX ADMIN — Medication 125 MILLIGRAM(S): at 23:43

## 2020-01-23 RX ADMIN — Medication 125 MILLIGRAM(S): at 17:07

## 2020-01-23 RX ADMIN — Medication 500 MILLIGRAM(S): at 13:22

## 2020-01-23 RX ADMIN — Medication 100 MILLIGRAM(S): at 01:04

## 2020-01-23 RX ADMIN — CEFEPIME 100 MILLIGRAM(S): 1 INJECTION, POWDER, FOR SOLUTION INTRAMUSCULAR; INTRAVENOUS at 21:16

## 2020-01-23 RX ADMIN — Medication 500 MILLIGRAM(S): at 21:17

## 2020-01-23 RX ADMIN — Medication 30 MILLIGRAM(S): at 17:07

## 2020-01-23 RX ADMIN — CEFEPIME 100 MILLIGRAM(S): 1 INJECTION, POWDER, FOR SOLUTION INTRAMUSCULAR; INTRAVENOUS at 13:21

## 2020-01-23 RX ADMIN — Medication 20 MILLILITER(S): at 14:41

## 2020-01-23 RX ADMIN — SODIUM CHLORIDE 1000 MILLILITER(S): 9 INJECTION INTRAMUSCULAR; INTRAVENOUS; SUBCUTANEOUS at 00:34

## 2020-01-23 RX ADMIN — Medication 50 GRAM(S): at 18:20

## 2020-01-23 RX ADMIN — MEROPENEM 100 MILLIGRAM(S): 1 INJECTION INTRAVENOUS at 22:01

## 2020-01-23 RX ADMIN — Medication 100 MILLIGRAM(S): at 13:22

## 2020-01-23 RX ADMIN — Medication 100 MILLIGRAM(S): at 22:38

## 2020-01-23 RX ADMIN — Medication 250 MILLIGRAM(S): at 17:07

## 2020-01-23 RX ADMIN — RIVAROXABAN 20 MILLIGRAM(S): KIT at 17:07

## 2020-01-23 RX ADMIN — CEFEPIME 100 MILLIGRAM(S): 1 INJECTION, POWDER, FOR SOLUTION INTRAMUSCULAR; INTRAVENOUS at 00:34

## 2020-01-23 NOTE — ED PROVIDER NOTE - OBJECTIVE STATEMENT
70 yo female hx of DM/HTN/obesity/hx of hernia repair in 2011 and revised in 2013 ([place], [place]) present c/o wound check. as per son, the wound hasn't been healing right since 2013. Patient was admitted and had surgery over the past few months for I&D and discharged to NH for IV abx ( Vanco). Patient started noticing pus drainage again yesterday and fever today so NH sent patient back to ED for evaluation. Reported the swelling and redness at the area had never improved since the last admission.   Denies increasing pain/HA/dizziness/chest pain/palpitation/sob/abd pain/n/v/d/ black stool/bloody stool/urinary sxs

## 2020-01-23 NOTE — PROGRESS NOTE ADULT - SUBJECTIVE AND OBJECTIVE BOX
HPI:  69 year old female with PMH of HTN, DM, obesity, hernia repair in 2011 and subsequent revision in 2013, abdominal wall abscess s/p debridement by Dr. Banda in 12/2019 was sent to  ED from nursing home for wound check. Pt reports tenderness and drainage  from the wound area. Pr was discharged to nursing home on 12/23/19 on Vanco and Meropenem. Denies increasing pain, HA, dizziness, CP, SOB.          PAST MEDICAL & SURGICAL HISTORY:  Gastroesophageal reflux disease  Obesity  Diabetes mellitus  HTN (hypertension)  History of cholecystectomy  H/O hernia repair: 2011 and revised in 2013        MEDICATIONS  (STANDING):  cefepime   IVPB 2000 milliGRAM(s) IV Intermittent every 8 hours  collagenase Ointment 1 Application(s) Topical daily  magnesium gluconate 500 milliGRAM(s) Oral every 8 hours  meropenem  IVPB 1000 milliGRAM(s) IV Intermittent every 8 hours  metroNIDAZOLE  IVPB 500 milliGRAM(s) IV Intermittent every 8 hours  NIFEdipine XL 30 milliGRAM(s) Oral daily  rivaroxaban 20 milliGRAM(s) Oral with dinner  vancomycin  IVPB 1000 milliGRAM(s) IV Intermittent every 12 hours    MEDICATIONS  (PRN):  acetaminophen   Tablet .. 650 milliGRAM(s) Oral every 6 hours PRN Mild Pain (1 - 3)  oxycodone    5 mG/acetaminophen 325 mG 2 Tablet(s) Oral every 6 hours PRN Moderate Pain (4 - 6)        Allergies    No Known Allergies    Intolerances          SOCIAL HISTORY:  Tobacco use:denies   Alcohol use:denies  Drug use: denies      FAMILY HISTORY:  Pt denies pertinent family history        I&O's Summary      Vital Signs Last 24 Hrs  T(C): 36.9 (23 Jan 2020 06:53), Max: 38.4 (22 Jan 2020 23:18)  T(F): 98.4 (23 Jan 2020 06:53), Max: 101.2 (22 Jan 2020 23:18)  HR: 91 (23 Jan 2020 06:53) (76 - 93)  BP: 174/75 (23 Jan 2020 06:53) (153/73 - 176/75)  RR: 18 (23 Jan 2020 06:53) (18 - 22)  SpO2: 97% (23 Jan 2020 03:31) (97% - 99%)    Physical Exam:  General:  NAD, resting comfortably.  HEENT:  NC/AT  Pulmonary:  CTA B/L, Normal resp effort.  Cardiovascular:  S1/S2, RRR, no murmurs  Abdominal: soft, RUQ wall with 2 superficial  healing wounds, both with no necrotic tissue and granulation; 5 mm fistula noted, large area of surrounding erythema and induration. Purulent discharge noted on palpation  Extremities: No LE edema   Neuro:  Awake, alert & oriented x 3          LABS:                        10.7   9.97  )-----------( 197      ( 23 Jan 2020 12:10 )             33.3     01-23    143  |  108  |  8<L>  ----------------------------<  94  3.5   |  23  |  0.5<L>    Ca    9.8      23 Jan 2020 12:10  Mg     1.5     01-23    TPro  5.6<L>  /  Alb  3.2<L>  /  TBili  1.4<H>  /  DBili  x   /  AST  11  /  ALT  10  /  AlkPhos  105  01-23        CAPILLARY BLOOD GLUCOSE      POCT Blood Glucose.: 86 mg/dL (23 Jan 2020 11:37)  POCT Blood Glucose.: 99 mg/dL (23 Jan 2020 07:59)    LIVER FUNCTIONS - ( 23 Jan 2020 12:10 )  Alb: 3.2 g/dL / Pro: 5.6 g/dL / ALK PHOS: 105 U/L / ALT: 10 U/L / AST: 11 U/L / GGT: x             Cultures:          RADIOLOGY & ADDITIONAL STUDIES:   CT Abdomen and Pelvis w/ IV Cont (01.23.20 @ 01:47) >  EXAM:  CT ABDOMEN AND PELVIS IC            PROCEDURE DATE:  01/23/2020            INTERPRETATION:  CLINICAL STATEMENT: Abdominal abscess      TECHNIQUE: Contiguous axial CT images were obtained from the lower chest to the pubic symphysis following administration of 100cc Optiray 320 intravenous contrast.  Oral contrast was not administered.  Reformatted images in the coronal and sagittal planes were acquired.    COMPARISON CT: 9/26/2019    OTHER STUDIES USED FOR CORRELATION: None.       FINDINGS:    LOWER CHEST: Trace bibasilar subsegmental atelectatic change.    HEPATOBILIARY: No definite hepatic masses. Post cholecystectomy. No intra or extra hepatic biliary ductal dilatation.    SPLEEN: Unremarkable.    PANCREAS: Unremarkable.    ADRENALGLANDS: Unchanged indeterminate 2.3 x 1.4 cm left adrenal gland nodule    KIDNEYS: Redemonstrated right renal hypertrophy and congenitally absent left kidney.    ABDOMINOPELVIC NODES: No abdominopelvic lymphadenopathy.    PELVIC ORGANS: Redemonstrated calcified uterine fibroid.    PERITONEUM/MESENTERY/BOWEL: Diastases of the anterior abdominal wall, with defect containing bowel and a portion of the liver. No evidence of bowel obstruction. No free intraperitoneal air or ascites.    BONES/SOFT TISSUES: Redemonstrated inflammatory change along the right anterior abdomen and right flank. Interval coalescence of fluid in the subcutaneous right flank measures 6.3 x 4.6 x 1.9 cm (CC by AP by TRV).      IMPRESSION:     Persistent right flank inflammatory change with oblong subcutaneous abscess measuring up to 6.3 cm craniocaudal in the right flank subcutaneous fat.                DURAN SIFUENTES M.D., RESIDENT RADIOLOGIST  This document has been electronically signed.  JAY ROBLES M.D., ATTENDING RADIOLOGIST  This document has been electronically signed. Jan 23 2020  2:08AM

## 2020-01-23 NOTE — H&P ADULT - PROBLEM SELECTOR PLAN 1
Continue IV antibiotics in use at NH (vancomycin and Meropenem) along with antibiotics started in th e ER (cefepime and flagyl) pending ID and surgical inputs

## 2020-01-23 NOTE — ED PROVIDER NOTE - PHYSICAL EXAMINATION
CONSTITUTIONAL: Well-appearing; well-nourished; in no apparent distress.   EYES: PERRL; EOM intact.   ENT: normal nose; no rhinorrhea; normal pharynx with no tonsillar hypertrophy.   NECK: Supple; non-tender; no cervical lymphadenopathy. No JVD.   CARDIOVASCULAR: Normal S1, S2; no murmurs, rubs, or gallops.   RESPIRATORY: Normal chest excursion with respiration; breath sounds clear and equal bilaterally; no wheezes, rhonchi, or rales.  GI/: obese round and soft abdomen. Normal bowel sounds; non-distended; non-tender; no palpable organomegaly.   MS: No evidence of trauma or deformity. Non-tender to palpation. No scoliosis. No CVA tenderness. Normal ROM in all four extremities; non-tender to palpation; distal pulses are normal.   SKIN: + large area of erythema/ induration and tenderness to RUQ of abdominal wall extending to RLQ of abdomen. + purulent drainage. + softness and fluctuant   NEURO/PSYCH: A & O x 4; grossly unremarkable.

## 2020-01-23 NOTE — ED PROVIDER NOTE - NS ED ROS FT
Constitutional: + fever, chills, no recent weight loss, change in appetite or malaise  Eyes: no redness/discharge/pain/vision changes  ENT: no rhinorrhea/ear pain/sore throat  Cardiac: No chest pain, SOB or edema.  Respiratory: No cough or respiratory distress  GI: No nausea, vomiting, diarrhea or abdominal pain.  : No dysuria, frequency, urgency or hematuria  MS: no pain to back or extremities, no loss of ROM, no weakness  Neuro: No headache or weakness. No LOC.  Skin: see HPI  Endocrine: No history of thyroid disease or diabetes.

## 2020-01-23 NOTE — ED PROVIDER NOTE - CARE PLAN
Principal Discharge DX:	Sepsis  Secondary Diagnosis:	Abdominal wall abscess at site of surgical wound

## 2020-01-23 NOTE — PROGRESS NOTE ADULT - ASSESSMENT
69 year old female with PMH of HTN, DM, obesity, hernia repair in 2011 and subsequent revision in 2013, abdominal wall abscess s/p debridement by Dr. Banda in 12/2019 presents subcutaneous abscess in the right flank subcutaneous fat.  Pt had bedside I&D by Dr. Banda and surgery fellow today.    Plan:  - f/u cultures  - IV Abx  - daily dressing changes  - DASH diet  - f/u labs    Plan discussed with Dr. Banda

## 2020-01-23 NOTE — ED PROVIDER NOTE - PROGRESS NOTE DETAILS
Surgery MELBA Armstrong aware of consultation Patient currently on Vano at NH. added Cefepime and flagyl to the abscess.

## 2020-01-23 NOTE — ED PROVIDER NOTE - CLINICAL SUMMARY MEDICAL DECISION MAKING FREE TEXT BOX
69yF  hx of abdominal wall abscess -  OR  twice  debridement   Dr Banda - most recent Dec 19 -  pt  since yesterday  erythema pain  and purulence to area.  afebrile in ED,   labs wnl  mild leukocytosis  CT Persistent right flank inflammatory change with oblong subcutaneous abscess measuring up to 6.3 cm craniocaudal in the right flank subcutaneous fat.  abx  given  ,  surgery consulted  admitted to medicine

## 2020-01-23 NOTE — H&P ADULT - NSHPLABSRESULTS_GEN_ALL_CORE
< from: CT Abdomen and Pelvis w/ IV Cont (01.23.20 @ 01:47) >      EXAM:  CT ABDOMEN AND PELVIS IC            PROCEDURE DATE:  01/23/2020      IMPRESSION:     Persistent right flank inflammatory change with oblong subcutaneous abscess measuring up to 6.3 cm craniocaudal in the right flank subcutaneous fat.    DURAN SIFUENTES M.D., RESIDENT RADIOLOGIST  This document has been electronically signed.  JAY ROBLES M.D., ATTENDING RADIOLOGIST  This document has been electronically signed. Jan 23 2020  2:08AM        < end of copied text >                          12.3   14.29 )-----------( 235      ( 23 Jan 2020 00:10 )             37.3     01-23    139  |  104  |  13  ----------------------------<  113<H>  3.9   |  20  |  0.6<L>    Ca    9.9      23 Jan 2020 00:10    TPro  6.7  /  Alb  3.8  /  TBili  1.7<H>  /  DBili  x   /  AST  14  /  ALT  11  /  AlkPhos  118<H>  01-23              Lactate Trend  01-23 @ 00:10 Lactate:1.7         CAPILLARY BLOOD GLUCOSE

## 2020-01-23 NOTE — H&P ADULT - HISTORY OF PRESENT ILLNESS
68yo female, currently a resident at a NH, presents to the ER for "wound check" as NH reports the wound has cellulitis extending to the right mid and lower abdominal wall. Patient first had hernia repair to area in 2011 and subsequently revised in 2013. Son told ER wound has not healed correctly since 2013 procedure (Patient mentions that she has had 4 surgeries to that area). Patient is on vancomycin and meropenem at her facility. Not complaining of pain 70yo female, currently a resident at a NH, presents to the ER for "wound check" as NH reports the wound has cellulitis extending to the right mid and lower abdominal wall along with drainage. Patient first had hernia repair to area in 2011 and subsequently revised in 2013. Son told ER wound has not healed correctly since 2013 procedure (Patient mentions that she has had 4 surgeries to that area). Patient is on vancomycin and meropenem at her facility. Not complaining of pain 70yo female, currently a resident at a NH and PMH includes pulmonary embolism (on DOAC), presents to the ER for "wound check" as NH reports the wound has cellulitis extending to the right mid and lower abdominal wall along with drainage. Patient first had hernia repair to area in 2011 and subsequently revised in 2013. Son told ER wound has not healed correctly since 2013 procedure (Patient mentions that she has had 4 surgeries to that area). Patient is on vancomycin and meropenem at her facility. Not complaining of pain

## 2020-01-24 DIAGNOSIS — E66.9 OBESITY, UNSPECIFIED: ICD-10-CM

## 2020-01-24 DIAGNOSIS — A04.72 ENTEROCOLITIS DUE TO CLOSTRIDIUM DIFFICILE, NOT SPECIFIED AS RECURRENT: ICD-10-CM

## 2020-01-24 DIAGNOSIS — E11.9 TYPE 2 DIABETES MELLITUS WITHOUT COMPLICATIONS: ICD-10-CM

## 2020-01-24 LAB
ANION GAP SERPL CALC-SCNC: 16 MMOL/L — HIGH (ref 7–14)
ANION GAP SERPL CALC-SCNC: 16 MMOL/L — HIGH (ref 7–14)
BUN SERPL-MCNC: 11 MG/DL — SIGNIFICANT CHANGE UP (ref 10–20)
BUN SERPL-MCNC: 9 MG/DL — LOW (ref 10–20)
CALCIUM SERPL-MCNC: 10.2 MG/DL — HIGH (ref 8.5–10.1)
CALCIUM SERPL-MCNC: 10.3 MG/DL — HIGH (ref 8.5–10.1)
CHLORIDE SERPL-SCNC: 102 MMOL/L — SIGNIFICANT CHANGE UP (ref 98–110)
CHLORIDE SERPL-SCNC: 103 MMOL/L — SIGNIFICANT CHANGE UP (ref 98–110)
CO2 SERPL-SCNC: 23 MMOL/L — SIGNIFICANT CHANGE UP (ref 17–32)
CO2 SERPL-SCNC: 24 MMOL/L — SIGNIFICANT CHANGE UP (ref 17–32)
CREAT SERPL-MCNC: 0.6 MG/DL — LOW (ref 0.7–1.5)
CREAT SERPL-MCNC: 0.6 MG/DL — LOW (ref 0.7–1.5)
GLUCOSE BLDC GLUCOMTR-MCNC: 134 MG/DL — HIGH (ref 70–99)
GLUCOSE BLDC GLUCOMTR-MCNC: 137 MG/DL — HIGH (ref 70–99)
GLUCOSE BLDC GLUCOMTR-MCNC: 143 MG/DL — HIGH (ref 70–99)
GLUCOSE BLDC GLUCOMTR-MCNC: 143 MG/DL — HIGH (ref 70–99)
GLUCOSE SERPL-MCNC: 156 MG/DL — HIGH (ref 70–99)
GLUCOSE SERPL-MCNC: 208 MG/DL — HIGH (ref 70–99)
HCT VFR BLD CALC: 41.3 % — SIGNIFICANT CHANGE UP (ref 37–47)
HCT VFR BLD CALC: 41.9 % — SIGNIFICANT CHANGE UP (ref 37–47)
HGB BLD-MCNC: 13.4 G/DL — SIGNIFICANT CHANGE UP (ref 12–16)
HGB BLD-MCNC: 13.5 G/DL — SIGNIFICANT CHANGE UP (ref 12–16)
MAGNESIUM SERPL-MCNC: 1.9 MG/DL — SIGNIFICANT CHANGE UP (ref 1.8–2.4)
MAGNESIUM SERPL-MCNC: 1.9 MG/DL — SIGNIFICANT CHANGE UP (ref 1.8–2.4)
MCHC RBC-ENTMCNC: 27.9 PG — SIGNIFICANT CHANGE UP (ref 27–31)
MCHC RBC-ENTMCNC: 27.9 PG — SIGNIFICANT CHANGE UP (ref 27–31)
MCHC RBC-ENTMCNC: 32 G/DL — SIGNIFICANT CHANGE UP (ref 32–37)
MCHC RBC-ENTMCNC: 32.7 G/DL — SIGNIFICANT CHANGE UP (ref 32–37)
MCV RBC AUTO: 85.3 FL — SIGNIFICANT CHANGE UP (ref 81–99)
MCV RBC AUTO: 87.3 FL — SIGNIFICANT CHANGE UP (ref 81–99)
NRBC # BLD: 0 /100 WBCS — SIGNIFICANT CHANGE UP (ref 0–0)
NRBC # BLD: 0 /100 WBCS — SIGNIFICANT CHANGE UP (ref 0–0)
PLATELET # BLD AUTO: 283 K/UL — SIGNIFICANT CHANGE UP (ref 130–400)
PLATELET # BLD AUTO: 315 K/UL — SIGNIFICANT CHANGE UP (ref 130–400)
POTASSIUM SERPL-MCNC: 3.1 MMOL/L — LOW (ref 3.5–5)
POTASSIUM SERPL-MCNC: 3.4 MMOL/L — LOW (ref 3.5–5)
POTASSIUM SERPL-SCNC: 3.1 MMOL/L — LOW (ref 3.5–5)
POTASSIUM SERPL-SCNC: 3.4 MMOL/L — LOW (ref 3.5–5)
RBC # BLD: 4.8 M/UL — SIGNIFICANT CHANGE UP (ref 4.2–5.4)
RBC # BLD: 4.84 M/UL — SIGNIFICANT CHANGE UP (ref 4.2–5.4)
RBC # FLD: 13.2 % — SIGNIFICANT CHANGE UP (ref 11.5–14.5)
RBC # FLD: 13.2 % — SIGNIFICANT CHANGE UP (ref 11.5–14.5)
SODIUM SERPL-SCNC: 142 MMOL/L — SIGNIFICANT CHANGE UP (ref 135–146)
SODIUM SERPL-SCNC: 142 MMOL/L — SIGNIFICANT CHANGE UP (ref 135–146)
VANCOMYCIN TROUGH SERPL-MCNC: 19.1 UG/ML — HIGH (ref 5–10)
WBC # BLD: 14.04 K/UL — HIGH (ref 4.8–10.8)
WBC # BLD: 14.82 K/UL — HIGH (ref 4.8–10.8)
WBC # FLD AUTO: 14.04 K/UL — HIGH (ref 4.8–10.8)
WBC # FLD AUTO: 14.82 K/UL — HIGH (ref 4.8–10.8)

## 2020-01-24 PROCEDURE — 99233 SBSQ HOSP IP/OBS HIGH 50: CPT

## 2020-01-24 PROCEDURE — 99222 1ST HOSP IP/OBS MODERATE 55: CPT

## 2020-01-24 RX ORDER — SODIUM CHLORIDE 9 MG/ML
1000 INJECTION, SOLUTION INTRAVENOUS
Refills: 0 | Status: DISCONTINUED | OUTPATIENT
Start: 2020-01-24 | End: 2020-01-27

## 2020-01-24 RX ORDER — VANCOMYCIN HCL 1 G
1500 VIAL (EA) INTRAVENOUS EVERY 12 HOURS
Refills: 0 | Status: DISCONTINUED | OUTPATIENT
Start: 2020-01-24 | End: 2020-01-27

## 2020-01-24 RX ORDER — ONDANSETRON 8 MG/1
4 TABLET, FILM COATED ORAL EVERY 8 HOURS
Refills: 0 | Status: DISCONTINUED | OUTPATIENT
Start: 2020-01-24 | End: 2020-01-24

## 2020-01-24 RX ORDER — ONDANSETRON 8 MG/1
8 TABLET, FILM COATED ORAL EVERY 8 HOURS
Refills: 0 | Status: DISCONTINUED | OUTPATIENT
Start: 2020-01-24 | End: 2020-01-27

## 2020-01-24 RX ORDER — FAMOTIDINE 10 MG/ML
20 INJECTION INTRAVENOUS
Refills: 0 | Status: DISCONTINUED | OUTPATIENT
Start: 2020-01-24 | End: 2020-01-27

## 2020-01-24 RX ORDER — HEPARIN SODIUM 5000 [USP'U]/ML
5000 INJECTION INTRAVENOUS; SUBCUTANEOUS EVERY 12 HOURS
Refills: 0 | Status: DISCONTINUED | OUTPATIENT
Start: 2020-01-24 | End: 2020-01-25

## 2020-01-24 RX ORDER — MORPHINE SULFATE 50 MG/1
2 CAPSULE, EXTENDED RELEASE ORAL EVERY 4 HOURS
Refills: 0 | Status: DISCONTINUED | OUTPATIENT
Start: 2020-01-24 | End: 2020-01-27

## 2020-01-24 RX ADMIN — MORPHINE SULFATE 2 MILLIGRAM(S): 50 CAPSULE, EXTENDED RELEASE ORAL at 15:46

## 2020-01-24 RX ADMIN — Medication 1 APPLICATION(S): at 11:19

## 2020-01-24 RX ADMIN — ONDANSETRON 4 MILLIGRAM(S): 8 TABLET, FILM COATED ORAL at 04:08

## 2020-01-24 RX ADMIN — Medication 125 MILLIGRAM(S): at 17:48

## 2020-01-24 RX ADMIN — MORPHINE SULFATE 2 MILLIGRAM(S): 50 CAPSULE, EXTENDED RELEASE ORAL at 15:01

## 2020-01-24 RX ADMIN — ONDANSETRON 8 MILLIGRAM(S): 8 TABLET, FILM COATED ORAL at 21:23

## 2020-01-24 RX ADMIN — Medication 30 MILLIGRAM(S): at 06:17

## 2020-01-24 RX ADMIN — Medication 500 MILLIGRAM(S): at 06:17

## 2020-01-24 RX ADMIN — Medication 500 MILLIGRAM(S): at 21:25

## 2020-01-24 RX ADMIN — MEROPENEM 100 MILLIGRAM(S): 1 INJECTION INTRAVENOUS at 05:10

## 2020-01-24 RX ADMIN — Medication 125 MILLIGRAM(S): at 06:16

## 2020-01-24 RX ADMIN — HEPARIN SODIUM 5000 UNIT(S): 5000 INJECTION INTRAVENOUS; SUBCUTANEOUS at 17:47

## 2020-01-24 RX ADMIN — ONDANSETRON 8 MILLIGRAM(S): 8 TABLET, FILM COATED ORAL at 13:10

## 2020-01-24 RX ADMIN — Medication 300 MILLIGRAM(S): at 17:47

## 2020-01-24 RX ADMIN — Medication 125 MILLIGRAM(S): at 11:19

## 2020-01-24 RX ADMIN — Medication 100 MILLIGRAM(S): at 06:54

## 2020-01-24 RX ADMIN — SODIUM CHLORIDE 100 MILLILITER(S): 9 INJECTION, SOLUTION INTRAVENOUS at 15:02

## 2020-01-24 RX ADMIN — CEFEPIME 100 MILLIGRAM(S): 1 INJECTION, POWDER, FOR SOLUTION INTRAMUSCULAR; INTRAVENOUS at 06:18

## 2020-01-24 RX ADMIN — Medication 250 MILLIGRAM(S): at 05:10

## 2020-01-24 NOTE — PROGRESS NOTE ADULT - ASSESSMENT
68yo female, currently a resident at a NH and PMH includes pulmonary embolism (on DOAC), HTN, DM presents to the ER for "wound check" as NH reports the wound has cellulitis extending to the right mid and lower abdominal wall along with drainage. Patient first had hernia repair to area in 2011 and subsequently revised in 2013. Son told ER wound has not healed correctly since 2013 procedure (Patient mentions that she has had 4 surgeries to that area). Patient is on vancomycin and meropenem at her facility. Not complaining of pain     # cellulitis and abscess of right flank wound     - continue IV Vanco   - needs surgical mgmt and wound care   - follow cultures    # C-diff colitis / hypokalemia     - continue oral vanco   - supplement and follow electrolytes     # coffee ground emesis x 2 this am     - possible upper GI bleed   - NPO   - hold anticoagulation for now   - check H/H q12h   - IV Protonix   - GI consult    # DM / HTN      - monitor  FS   - continue Nifedipine     DVT prophylaxis while anticoagulation on hold    #Progress Note Handoff  Pending (specify):  Consults___GI and surgery______, Tests_____H/H q12h , lytes q12h

## 2020-01-24 NOTE — CONSULT NOTE ADULT - ASSESSMENT
69 year old female with abdominal wound infection, course c/b Cdiff. 2 episodes of coffee ground emesis  hgb 12.3 -->  12.5 -->  12.3 -->  10.7 (likely lab error) -->  13.4  Doubt significant UGI bleed as BUN is low and hgb is rising.  However, it is possible that it is an early GI bleed and the biochemical manifestations have not occurred yet    - PPI BID  - NPO  - Check q6 hour cbc  - transfuse as needed  - 2 large bore IVs 69 year old female with abdominal wound infection, course c/b Cdiff. 2 episodes of "coffee ground emesis." Per nurse, vomit was green which is inconsistent with coffee grounds emesis  hgb 12.3 -->  12.5 -->  12.3 -->  10.7 (likely lab error) -->  13.4  Doubt significant UGI bleed as BUN is low, stool is yellow,  and hgb is rising.      - PPI BID  - diet as tolerated  - Check Bid hour cbc  - transfuse as needed  - 2 large bore IVs  - recall GI if any overt bleeding

## 2020-01-24 NOTE — PROGRESS NOTE ADULT - ASSESSMENT
· Assessment		  69 year old female with PMH of HTN, DM, obesity, hernia repair in 2011 and subsequent revision in 2013, abdominal wall abscess s/p debridement by Dr. Banda in 12/2019 presents subcutaneous abscess in the right flank subcutaneous fat.  Pt had bedside I&D by Dr. Banda and surgery resident 1/23. Wound culture +staph aureus    Case D/W Dr. Banda:   - pt booked for OR on Monday 1/27 for I&D abdominal wall abscess   - f/u cardio consult for pre-op clearance   - Xarelto currently on hold for possible GI bleed: please continue holding if possible for OR  - Per GI: PPI BID, serial CBC's, transfuse PRN   - wound Cx; +staph aureus; on IV vanco; f/u final results   - + c-diff: on PO vanco   - continue daily wound packing   - will continue to follow   - pt aware of plan · Assessment		  69 year old female with PMH of HTN, DM, obesity, hernia repair in 2011 and subsequent revision in 2013, abdominal wall abscess s/p debridement by Dr. Banda in 12/2019 presents subcutaneous abscess in the right flank subcutaneous fat.  Pt had bedside I&D by Dr. Banda and surgery resident 1/23. Wound culture +staph aureus    Case D/W Dr. Banda:   - pt booked for OR on Monday 1/27 for I&D abdominal wall abscess   - f/u cardio consult for pre-op clearance   - Xarelto currently on hold for possible GI bleed: please continue holding if possible for OR  - Per GI: PPI BID, serial CBC's, transfuse PRN   - wound Cx; +staph aureus; on IV vanco; f/u final results   - + c-diff: on PO vanco   - increased leukocytosis (9-14) ; continue to monitor   - continue daily wound packing   - will continue to follow   - pt aware of plan

## 2020-01-24 NOTE — CONSULT NOTE ADULT - SUBJECTIVE AND OBJECTIVE BOX
DARCIE PATRICIA  69y, Female  Allergy: No Known Allergies      CHIEF COMPLAINT:     HPI:  68yo female, currently a resident at a NH and PMH includes pulmonary embolism (on DOAC), presents to the ER for "wound check" as NH reports the wound has cellulitis extending to the right mid and lower abdominal wall along with drainage. Patient first had hernia repair to area in 2011 and subsequently revised in 2013. Son told ER wound has not healed correctly since 2013 procedure (Patient mentions that she has had 4 surgeries to that area). Patient is on vancomycin and meropenem at her facility. Not complaining of pain (23 Jan 2020 07:40)    FAMILY HISTORY:    PAST MEDICAL & SURGICAL HISTORY:  Gastroesophageal reflux disease  Obesity  Diabetes mellitus  HTN (hypertension)  History of cholecystectomy  H/O hernia repair: 2011 and revised in 2013    FSh - not relevant   Substance Use (  ) never used  (  ) IVDU (  ) Other:  Tobacco Usage:  (   ) never smoked   (   ) former smoker   (   ) current smoker   Alcohol Usage: (   ) social  (   ) daily use (   ) denies  Sexual History:       ROS  10 SYSTEM REVIEW - abdal pain   nausea  loose BMs     VITALS:  T(F): 98.8, Max: 98.8 (01-24-20 @ 05:38)  HR: 65  BP: 174/81  RR: 18Vital Signs Last 24 Hrs  T(C): 37.1 (24 Jan 2020 05:38), Max: 37.1 (23 Jan 2020 21:06)  T(F): 98.8 (24 Jan 2020 05:38), Max: 98.8 (24 Jan 2020 05:38)  HR: 65 (24 Jan 2020 05:38) (65 - 73)  BP: 174/81 (24 Jan 2020 05:38) (155/72 - 174/81)  BP(mean): --  RR: 18 (24 Jan 2020 05:38) (18 - 18)  SpO2: --    PHYSICAL EXAM:  Gen: NAD, resting in bed  HEENT: Normocephalic, atraumatic  Neck: supple, no lymphadenopathy  CV:s1 s 2 +  Lungs: clear   Abdomen: Soft, BS present. r flank dressed  Ext: Warm, well perfused  Neuro: non focal, awake  Skin: no rash, no erythema    TESTS & MEASUREMENTS:                        10.7   9.97  )-----------( 197      ( 23 Jan 2020 12:10 )             33.3     01-23    143  |  108  |  8<L>  ----------------------------<  94  3.5   |  23  |  0.5<L>    Ca    9.8      23 Jan 2020 12:10  Mg     1.5     01-23    TPro  5.6<L>  /  Alb  3.2<L>  /  TBili  1.4<H>  /  DBili  x   /  AST  11  /  ALT  10  /  AlkPhos  105  01-23    eGFR if Non African American: 100 mL/min/1.73M2 (01-23-20 @ 12:10)  eGFR if African American: 116 mL/min/1.73M2 (01-23-20 @ 12:10)    LIVER FUNCTIONS - ( 23 Jan 2020 12:10 )  Alb: 3.2 g/dL / Pro: 5.6 g/dL / ALK PHOS: 105 U/L / ALT: 10 U/L / AST: 11 U/L / GGT: x                 Lactate, Blood: 1.7 mmol/L (01-23-20 @ 00:10)      INFECTIOUS DISEASES TESTING      RADIOLOGY & ADDITIONAL TESTS:  I have personally reviewed the last Chest xray  CXR      CT  CT Abdomen and Pelvis w/ IV Cont:   EXAM:  CT ABDOMEN AND PELVIS IC            PROCEDURE DATE:  01/23/2020            INTERPRETATION:  CLINICAL STATEMENT: Abdominal abscess      TECHNIQUE: Contiguous axial CT images were obtained from the lower chest to the pubic symphysis following administration of 100cc Optiray 320 intravenous contrast.  Oral contrast was not administered.  Reformatted images in the coronal and sagittal planes were acquired.    COMPARISON CT: 9/26/2019    OTHER STUDIES USED FOR CORRELATION: None.       FINDINGS:    LOWER CHEST: Trace bibasilar subsegmental atelectatic change.    HEPATOBILIARY: No definite hepatic masses. Post cholecystectomy. No intra or extra hepatic biliary ductal dilatation.    SPLEEN: Unremarkable.    PANCREAS: Unremarkable.    ADRENALGLANDS: Unchanged indeterminate 2.3 x 1.4 cm left adrenal gland nodule    KIDNEYS: Redemonstrated right renal hypertrophy and congenitally absent left kidney.    ABDOMINOPELVIC NODES: No abdominopelvic lymphadenopathy.    PELVIC ORGANS: Redemonstrated calcified uterine fibroid.    PERITONEUM/MESENTERY/BOWEL: Diastases of the anterior abdominal wall, with defect containing bowel and a portion of the liver. No evidence of bowel obstruction. No free intraperitoneal air or ascites.    BONES/SOFT TISSUES: Redemonstrated inflammatory change along the right anterior abdomen and right flank. Interval coalescence of fluid in the subcutaneous right flank measures 6.3 x 4.6 x 1.9 cm (CC by AP by TRV).      IMPRESSION:     Persistent right flank inflammatory change with oblong subcutaneous abscess measuring up to 6.3 cm craniocaudal in the right flank subcutaneous fat.                DURAN SIFUENTES M.D., RESIDENT RADIOLOGIST  This document has been electronically signed.  JAY ROBLES M.D., ATTENDING RADIOLOGIST  This document has been electronically signed. Jan 23 2020  2:08AM             (01-23-20 @ 01:47)      CARDIOLOGY TESTING      MEDICATIONS  collagenase Ointment 1  magnesium gluconate 500  meropenem  IVPB 1000  metroNIDAZOLE  IVPB 500  NIFEdipine XL 30  rivaroxaban 20  vancomycin    Solution 125  vancomycin  IVPB 1000      ANTIBIOTICS:  meropenem  IVPB 1000 milliGRAM(s) IV Intermittent every 8 hours  metroNIDAZOLE  IVPB 500 milliGRAM(s) IV Intermittent every 8 hours  vancomycin    Solution 125 milliGRAM(s) Oral every 6 hours  vancomycin  IVPB 1000 milliGRAM(s) IV Intermittent every 12 hours

## 2020-01-24 NOTE — PROGRESS NOTE ADULT - SUBJECTIVE AND OBJECTIVE BOX
S; Pt with vomiting thisAM - ?coffee grounds. Xarelto being held.  No other complaints  O; Vital Signs Last 24 Hrs  T(C): 36.6 (24 Jan 2020 16:24), Max: 37.1 (23 Jan 2020 21:06)  T(F): 97.9 (24 Jan 2020 16:24), Max: 98.8 (24 Jan 2020 05:38)  HR: 84 (24 Jan 2020 16:24) (65 - 84)  BP: 197/94 (24 Jan 2020 16:24) (155/72 - 197/94)  BP(mean): --  RR: 17 (24 Jan 2020 16:24) (17 - 18)  SpO2: --    EXAM:  abd: soft, slightly improved erythema/induration/tenderness; + active purulent drainage from fistula site; no drainage from I&D site    Labs:  CAPILLARY BLOOD GLUCOSE      POCT Blood Glucose.: 137 mg/dL (24 Jan 2020 11:43)  POCT Blood Glucose.: 143 mg/dL (24 Jan 2020 07:47)  POCT Blood Glucose.: 96 mg/dL (23 Jan 2020 21:09)                          13.4   14.04 )-----------( 283      ( 24 Jan 2020 07:11 )             41.9         01-24    142  |  102  |  9<L>  ----------------------------<  156<H>  3.1<L>   |  24  |  0.6<L>      Calcium, Total Serum: 10.3 mg/dL (01-24-20 @ 07:11)      LFTs:             5.6  | 1.4  | 11       ------------------[105     ( 23 Jan 2020 12:10 )  3.2  | x    | 10          Lipase:x      Amylase:x         Lactate, Blood: 1.7 mmol/L (01-23-20 @ 00:10)      Culture - Abscess with Gram Stain (collected 23 Jan 2020 00:10)  Source: .Abscess right abdominal wall abscess  Preliminary Report (24 Jan 2020 11:11):    Few Staphylococcus aureus

## 2020-01-24 NOTE — PROGRESS NOTE ADULT - SUBJECTIVE AND OBJECTIVE BOX
Patient ate breakfast this am but later had two episodes of coffee ground emesis     T(F): 98.8 (01-24-20 @ 05:38), Max: 98.8 (01-24-20 @ 05:38)  HR: 65 (01-24-20 @ 05:38)  BP: 174/81 (01-24-20 @ 05:38)  RR: 18 (01-24-20 @ 05:38)      PHYSICAL EXAM:  GENERAL: NAD  HEAD:  Atraumatic, Normocephalic  NERVOUS SYSTEM:  Alert & Oriented X3, no focal deficits   CHEST/LUNG: Clear to percussion bilaterally; No rales, rhonchi, wheezing, or rubs  HEART: Regular rate and rhythm; No murmurs, rubs, or gallops  ABDOMEN: Soft, large erythematous area right abdomen/flank  EXTREMITIES:  2+ Peripheral Pulses, No clubbing, cyanosis, or edema      LABS  01-24    142  |  102  |  9<L>  ----------------------------<  156<H>  3.1<L>   |  24  |  0.6<L>    Ca    10.3<H>      24 Jan 2020 07:11  Mg     1.9     01-24    TPro  5.6<L>  /  Alb  3.2<L>  /  TBili  1.4<H>  /  DBili  x   /  AST  11  /  ALT  10  /  AlkPhos  105  01-23                          13.4   14.04 )-----------( 283      ( 24 Jan 2020 07:11 )             41.9     TOXIGENIC CLOST.DIFFICILE POSITIVE;      C Diff by PCR Result: Positive    Culture Results:   Few Staphylococcus aureus (01-23-20)    RADIOLOGY  < from: CT Abdomen and Pelvis w/ IV Cont (01.23.20 @ 01:47) >  IMPRESSION:     Persistent right flank inflammatory change with oblong subcutaneous abscess measuring up to 6.3 cm craniocaudal in the right flank subcutaneous fat.    < end of copied text >    MEDICATIONS  (STANDING):  collagenase Ointment 1 Application(s) Topical daily  lactated ringers 1000 milliLiter(s) (100 mL/Hr) IV Continuous <Continuous>  magnesium gluconate 500 milliGRAM(s) Oral every 8 hours  NIFEdipine XL 30 milliGRAM(s) Oral daily  vancomycin    Solution 125 milliGRAM(s) Oral every 6 hours  vancomycin  IVPB 1500 milliGRAM(s) IV Intermittent every 12 hours    MEDICATIONS  (PRN):  acetaminophen   Tablet .. 650 milliGRAM(s) Oral every 6 hours PRN Mild Pain (1 - 3)  ondansetron Injectable 8 milliGRAM(s) IV Push every 8 hours PRN Nausea and/or Vomiting  oxycodone    5 mG/acetaminophen 325 mG 2 Tablet(s) Oral every 6 hours PRN Moderate Pain (4 - 6)

## 2020-01-24 NOTE — CONSULT NOTE ADULT - PROBLEM SELECTOR RECOMMENDATION 9
acute episode  prior MSSA infections   ? retained material   Need to clarify with surgical team   IV VAnco for now   at least 21 days of abx - ? IV

## 2020-01-24 NOTE — PROVIDER CONTACT NOTE (OTHER) - SITUATION
pt bp elevated; pt states she feels ok; MD waite aware; ordered morphine for patient as part of intervention; will continue to monitor and implement plan of care

## 2020-01-24 NOTE — CHART NOTE - NSCHARTNOTEFT_GEN_A_CORE
Patient's gastric content tested unofficially and may be guaiac positive. Will order formal testing (occult blood, gastric) and start pepcid fpr now

## 2020-01-24 NOTE — CONSULT NOTE ADULT - SUBJECTIVE AND OBJECTIVE BOX
Gastroenterology Consultation:    Patient is a 69y old  Female who presents with a chief complaint of abdominal wall abscess (24 Jan 2020 11:39)      Admitted on: 01-23-20  HPI:  68yo female, currently a resident at a NH and PMH includes pulmonary embolism (on DOAC), presents to the ER for "wound check" as NH reports the wound has cellulitis extending to the right mid and lower abdominal wall along with drainage. Patient first had hernia repair to area in 2011 and subsequently revised in 2013. Son told ER wound has not healed correctly since 2013 procedure (Patient mentions that she has had 4 surgeries to that area). Patient is on vancomycin and meropenem at her facility. Not complaining of pain (23 Jan 2020 07:40)    GI HPI    69 year old female with a history of PE (on rivaroxaban) presents for abdominal wall infection. Today she was noted to have two episodes of coffee grounds emesis. She was found to be Cdiff positive on 1/23     Prior records Reviewed (Y/N): Y  History obtained from person other than patient (Y/N):    Prior EGD:  Prior Colonoscopy:      PAST MEDICAL & SURGICAL HISTORY:  Gastroesophageal reflux disease  Obesity  Diabetes mellitus  HTN (hypertension)  History of cholecystectomy  H/O hernia repair: 2011 and revised in 2013      FAMILY HISTORY:  Noncontributory    Social History:  Tobacco: Past    Home Medications:  acetaminophen 650 mg oral tablet, extended release: orally every 6 hours, As Needed (23 Jan 2020 08:04)  Mag-G 500 mg oral tablet: 1 tab(s) orally every 8 hours (23 Jan 2020 08:04)  meropenem:  (23 Jan 2020 08:04)  NIFEdipine 30 mg oral tablet, extended release: 1 tab(s) orally once a day (23 Jan 2020 08:04)  oxycodone-acetaminophen 5 mg-325 mg oral tablet: 1 tab(s) orally every 6 hours, As Needed - 6) for severe pain  (23 Jan 2020 08:04)  potassium chloride 10 mEq oral capsule, extended release: 2 cap(s) orally once a day (23 Jan 2020 08:04)  rivaroxaban 20 mg oral tablet: 1 tab(s) orally once a day (before a meal) (23 Jan 2020 08:04)  Santyl:  (23 Jan 2020 08:04)  vancomycin:  (23 Jan 2020 08:04)    MEDICATIONS  (STANDING):  collagenase Ointment 1 Application(s) Topical daily  heparin  Injectable 5000 Unit(s) SubCutaneous every 12 hours  lactated ringers 1000 milliLiter(s) (100 mL/Hr) IV Continuous <Continuous>  magnesium gluconate 500 milliGRAM(s) Oral every 8 hours  NIFEdipine XL 30 milliGRAM(s) Oral daily  vancomycin    Solution 125 milliGRAM(s) Oral every 6 hours  vancomycin  IVPB 1500 milliGRAM(s) IV Intermittent every 12 hours    MEDICATIONS  (PRN):  acetaminophen   Tablet .. 650 milliGRAM(s) Oral every 6 hours PRN Mild Pain (1 - 3)  ondansetron Injectable 8 milliGRAM(s) IV Push every 8 hours PRN Nausea and/or Vomiting  oxycodone    5 mG/acetaminophen 325 mG 2 Tablet(s) Oral every 6 hours PRN Moderate Pain (4 - 6)      Allergies  No Known Allergies      Review of Systems:   Constitutional:  No Fever, No Chills  ENT/Mouth:  No Hearing Changes,  No Difficulty Swallowing  Eyes:  No Eye Pain, No Vision Changes  Cardiovascular:  No Chest Pain, No Palpitations  Respiratory:  No Cough, No Dyspnea  Gastrointestinal:  As described in HPI  Musculoskeletal:  No Joint Swelling, No Back Pain  Skin:  No Skin Lesions, No Jaundice  Neuro:  No Syncope, No Dizziness  Heme/Lymph:  No Bruising, No Bleeding.          Physical Examination:  T(C): 37.1 (01-24-20 @ 05:38), Max: 37.1 (01-23-20 @ 21:06)  HR: 65 (01-24-20 @ 05:38) (65 - 73)  BP: 174/81 (01-24-20 @ 05:38) (155/72 - 174/81)  RR: 18 (01-24-20 @ 05:38) (18 - 18)  SpO2: --      01-23-20 @ 07:01  -  01-24-20 @ 07:00  --------------------------------------------------------  IN: 0 mL / OUT: 800 mL / NET: -800 mL        Constitutional: No acute distress.  Eyes:. Conjunctivae are clear, Sclera is non-icteric.  Ears Nose and Throat: The external ears are normal appearing,  Oral mucosa is pink and moist.  Respiratory:  No signs of respiratory distress. Lung sounds are clear bilaterally.  Cardiovascular:  S1 S2, Regular rate and rhythm.  GI: Abdominal wound    Neuro: No Tremor, No involuntary movements  Skin: No rashes, No Jaundice.          Data: (reviewed by attending)                        13.4   14.04 )-----------( 283      ( 24 Jan 2020 07:11 )             41.9     Hgb Trend:  13.4  01-24-20 @ 07:11  10.7  01-23-20 @ 12:10  12.3  01-23-20 @ 00:10        01-24    142  |  102  |  9<L>  ----------------------------<  156<H>  3.1<L>   |  24  |  0.6<L>    Ca    10.3<H>      24 Jan 2020 07:11  Mg     1.9     01-24    TPro  5.6<L>  /  Alb  3.2<L>  /  TBili  1.4<H>  /  DBili  x   /  AST  11  /  ALT  10  /  AlkPhos  105  01-23    Liver panel trend:  TBili 1.4   /   AST 11   /   ALT 10   /   AlkP 105   /   Tptn 5.6   /   Alb 3.2    /   DBili --      01-23  TBili 1.7   /   AST 14   /   ALT 11   /   AlkP 118   /   Tptn 6.7   /   Alb 3.8    /   DBili --      01-23          Culture - Abscess with Gram Stain (collected 23 Jan 2020 00:10)  Source: .Abscess right abdominal wall abscess  Preliminary Report (24 Jan 2020 11:11):    Few Staphylococcus aureus          Radiology:(reviewed by attending)      EXAM:  CT ABDOMEN AND PELVIS IC            PROCEDURE DATE:  01/23/2020            INTERPRETATION:  CLINICAL STATEMENT: Abdominal abscess      TECHNIQUE: Contiguous axial CT images were obtained from the lower chest to the pubic symphysis following administration of 100cc Optiray 320 intravenous contrast.  Oral contrast was not administered.  Reformatted images in the coronal and sagittal planes were acquired.    COMPARISON CT: 9/26/2019    OTHER STUDIES USED FOR CORRELATION: None.       FINDINGS:    LOWER CHEST: Trace bibasilar subsegmental atelectatic change.    HEPATOBILIARY: No definite hepatic masses. Post cholecystectomy. No intra or extra hepatic biliary ductal dilatation.    SPLEEN: Unremarkable.    PANCREAS: Unremarkable.    ADRENAL GLANDS: Unchanged indeterminate 2.3 x 1.4 cm left adrenal gland nodule    KIDNEYS: Redemonstrated right renal hypertrophy and congenitally absent left kidney.    ABDOMINOPELVIC NODES: No abdominopelvic lymphadenopathy.    PELVIC ORGANS: Redemonstrated calcified uterine fibroid.    PERITONEUM/MESENTERY/BOWEL: Diastases of the anterior abdominal wall, with defect containing bowel and a portion of the liver. No evidence of bowel obstruction. No free intraperitoneal air or ascites.    BONES/SOFT TISSUES: Redemonstrated inflammatory change along the right anterior abdomen and right flank. Interval coalescence of fluid in the subcutaneous right flank measures 6.3 x 4.6 x 1.9 cm (CC by AP by TRV).      IMPRESSION:     Persistent right flank inflammatory change with oblong subcutaneous abscess measuring up to 6.3 cm craniocaudal in the right flank subcutaneous fat.                DURAN SIFUENTES M.D., RESIDENT RADIOLOGIST  This document has been electronically signed.  JAY ROBLES M.D., ATTENDING RADIOLOGIST  This document has been electronically signed. Jan 23 2020  2:08AM Gastroenterology Consultation:    Patient is a 69y old  Female who presents with a chief complaint of abdominal wall abscess (24 Jan 2020 11:39)      Admitted on: 01-23-20  HPI:  68yo female, currently a resident at a NH and PMH includes pulmonary embolism (on DOAC), presents to the ER for "wound check" as NH reports the wound has cellulitis extending to the right mid and lower abdominal wall along with drainage. Patient first had hernia repair to area in 2011 and subsequently revised in 2013. Son told ER wound has not healed correctly since 2013 procedure (Patient mentions that she has had 4 surgeries to that area). Patient is on vancomycin and meropenem at her facility. Not complaining of pain (23 Jan 2020 07:40)    GI HPI    69 year old female with a history of PE (on rivaroxaban) presents for abdominal wall infection. Today she was noted to have two episodes of coffee grounds emesis. i spoke to the nurse who indicated that the vomit was green and contained dark particles.  She was found to be Cdiff positive on 1/23     Prior records Reviewed (Y/N): Y  History obtained from person other than patient (Y/N): Y (nurse)       PAST MEDICAL & SURGICAL HISTORY:  Gastroesophageal reflux disease  Obesity  Diabetes mellitus  HTN (hypertension)  History of cholecystectomy  H/O hernia repair: 2011 and revised in 2013      FAMILY HISTORY:  Noncontributory    Social History:  Tobacco: Past    Home Medications:  acetaminophen 650 mg oral tablet, extended release: orally every 6 hours, As Needed (23 Jan 2020 08:04)  Mag-G 500 mg oral tablet: 1 tab(s) orally every 8 hours (23 Jan 2020 08:04)  meropenem:  (23 Jan 2020 08:04)  NIFEdipine 30 mg oral tablet, extended release: 1 tab(s) orally once a day (23 Jan 2020 08:04)  oxycodone-acetaminophen 5 mg-325 mg oral tablet: 1 tab(s) orally every 6 hours, As Needed - 6) for severe pain  (23 Jan 2020 08:04)  potassium chloride 10 mEq oral capsule, extended release: 2 cap(s) orally once a day (23 Jan 2020 08:04)  rivaroxaban 20 mg oral tablet: 1 tab(s) orally once a day (before a meal) (23 Jan 2020 08:04)  Santyl:  (23 Jan 2020 08:04)  vancomycin:  (23 Jan 2020 08:04)    MEDICATIONS  (STANDING):  collagenase Ointment 1 Application(s) Topical daily  heparin  Injectable 5000 Unit(s) SubCutaneous every 12 hours  lactated ringers 1000 milliLiter(s) (100 mL/Hr) IV Continuous <Continuous>  magnesium gluconate 500 milliGRAM(s) Oral every 8 hours  NIFEdipine XL 30 milliGRAM(s) Oral daily  vancomycin    Solution 125 milliGRAM(s) Oral every 6 hours  vancomycin  IVPB 1500 milliGRAM(s) IV Intermittent every 12 hours    MEDICATIONS  (PRN):  acetaminophen   Tablet .. 650 milliGRAM(s) Oral every 6 hours PRN Mild Pain (1 - 3)  ondansetron Injectable 8 milliGRAM(s) IV Push every 8 hours PRN Nausea and/or Vomiting  oxycodone    5 mG/acetaminophen 325 mG 2 Tablet(s) Oral every 6 hours PRN Moderate Pain (4 - 6)      Allergies  No Known Allergies      Review of Systems:   Constitutional:  No Fever, No Chills  ENT/Mouth:  No Hearing Changes,  No Difficulty Swallowing  Eyes:  No Eye Pain, No Vision Changes  Cardiovascular:  No Chest Pain, No Palpitations  Respiratory:  No Cough, No Dyspnea  Gastrointestinal:  As described in HPI  Musculoskeletal:  No Joint Swelling, No Back Pain  Skin:  No Skin Lesions, No Jaundice  Neuro:  No Syncope, No Dizziness  Heme/Lymph:  No Bruising, No Bleeding.          Physical Examination:  T(C): 37.1 (01-24-20 @ 05:38), Max: 37.1 (01-23-20 @ 21:06)  HR: 65 (01-24-20 @ 05:38) (65 - 73)  BP: 174/81 (01-24-20 @ 05:38) (155/72 - 174/81)  RR: 18 (01-24-20 @ 05:38) (18 - 18)  SpO2: --      01-23-20 @ 07:01  -  01-24-20 @ 07:00  --------------------------------------------------------  IN: 0 mL / OUT: 800 mL / NET: -800 mL        Constitutional: No acute distress.  Eyes:. Conjunctivae are clear, Sclera is non-icteric.  Ears Nose and Throat: The external ears are normal appearing,  Oral mucosa is pink and moist.  Respiratory:  No signs of respiratory distress. Lung sounds are clear bilaterally.  Cardiovascular:  S1 S2, Regular rate and rhythm.  GI: Abdominal wound    Neuro: No Tremor, No involuntary movements  Skin: No rashes, No Jaundice.  Rectal exam: scant yellow stool          Data: (reviewed by attending)                        13.4   14.04 )-----------( 283      ( 24 Jan 2020 07:11 )             41.9     Hgb Trend:  13.4  01-24-20 @ 07:11  10.7  01-23-20 @ 12:10  12.3  01-23-20 @ 00:10        01-24    142  |  102  |  9<L>  ----------------------------<  156<H>  3.1<L>   |  24  |  0.6<L>    Ca    10.3<H>      24 Jan 2020 07:11  Mg     1.9     01-24    TPro  5.6<L>  /  Alb  3.2<L>  /  TBili  1.4<H>  /  DBili  x   /  AST  11  /  ALT  10  /  AlkPhos  105  01-23    Liver panel trend:  TBili 1.4   /   AST 11   /   ALT 10   /   AlkP 105   /   Tptn 5.6   /   Alb 3.2    /   DBili --      01-23  TBili 1.7   /   AST 14   /   ALT 11   /   AlkP 118   /   Tptn 6.7   /   Alb 3.8    /   DBili --      01-23          Culture - Abscess with Gram Stain (collected 23 Jan 2020 00:10)  Source: .Abscess right abdominal wall abscess  Preliminary Report (24 Jan 2020 11:11):    Few Staphylococcus aureus          Radiology:(reviewed by attending)      EXAM:  CT ABDOMEN AND PELVIS IC            PROCEDURE DATE:  01/23/2020            INTERPRETATION:  CLINICAL STATEMENT: Abdominal abscess      TECHNIQUE: Contiguous axial CT images were obtained from the lower chest to the pubic symphysis following administration of 100cc Optiray 320 intravenous contrast.  Oral contrast was not administered.  Reformatted images in the coronal and sagittal planes were acquired.    COMPARISON CT: 9/26/2019    OTHER STUDIES USED FOR CORRELATION: None.       FINDINGS:    LOWER CHEST: Trace bibasilar subsegmental atelectatic change.    HEPATOBILIARY: No definite hepatic masses. Post cholecystectomy. No intra or extra hepatic biliary ductal dilatation.    SPLEEN: Unremarkable.    PANCREAS: Unremarkable.    ADRENAL GLANDS: Unchanged indeterminate 2.3 x 1.4 cm left adrenal gland nodule    KIDNEYS: Redemonstrated right renal hypertrophy and congenitally absent left kidney.    ABDOMINOPELVIC NODES: No abdominopelvic lymphadenopathy.    PELVIC ORGANS: Redemonstrated calcified uterine fibroid.    PERITONEUM/MESENTERY/BOWEL: Diastases of the anterior abdominal wall, with defect containing bowel and a portion of the liver. No evidence of bowel obstruction. No free intraperitoneal air or ascites.    BONES/SOFT TISSUES: Redemonstrated inflammatory change along the right anterior abdomen and right flank. Interval coalescence of fluid in the subcutaneous right flank measures 6.3 x 4.6 x 1.9 cm (CC by AP by TRV).      IMPRESSION:     Persistent right flank inflammatory change with oblong subcutaneous abscess measuring up to 6.3 cm craniocaudal in the right flank subcutaneous fat.                DURAN SIFUENTES M.D., RESIDENT RADIOLOGIST  This document has been electronically signed.  JAY ROBLES M.D., ATTENDING RADIOLOGIST  This document has been electronically signed. Jan 23 2020  2:08AM

## 2020-01-25 LAB
-  AMPICILLIN/SULBACTAM: SIGNIFICANT CHANGE UP
-  CEFAZOLIN: SIGNIFICANT CHANGE UP
-  CLINDAMYCIN: SIGNIFICANT CHANGE UP
-  ERYTHROMYCIN: SIGNIFICANT CHANGE UP
-  GENTAMICIN: SIGNIFICANT CHANGE UP
-  OXACILLIN: SIGNIFICANT CHANGE UP
-  RIFAMPIN: SIGNIFICANT CHANGE UP
-  TETRACYCLINE: SIGNIFICANT CHANGE UP
-  TRIMETHOPRIM/SULFAMETHOXAZOLE: SIGNIFICANT CHANGE UP
-  VANCOMYCIN: SIGNIFICANT CHANGE UP
ANION GAP SERPL CALC-SCNC: 14 MMOL/L — SIGNIFICANT CHANGE UP (ref 7–14)
BUN SERPL-MCNC: 14 MG/DL — SIGNIFICANT CHANGE UP (ref 10–20)
CALCIUM SERPL-MCNC: 10.3 MG/DL — HIGH (ref 8.5–10.1)
CHLORIDE SERPL-SCNC: 104 MMOL/L — SIGNIFICANT CHANGE UP (ref 98–110)
CO2 SERPL-SCNC: 26 MMOL/L — SIGNIFICANT CHANGE UP (ref 17–32)
CREAT SERPL-MCNC: 0.6 MG/DL — LOW (ref 0.7–1.5)
GLUCOSE BLDC GLUCOMTR-MCNC: 110 MG/DL — HIGH (ref 70–99)
GLUCOSE BLDC GLUCOMTR-MCNC: 119 MG/DL — HIGH (ref 70–99)
GLUCOSE BLDC GLUCOMTR-MCNC: 144 MG/DL — HIGH (ref 70–99)
GLUCOSE BLDC GLUCOMTR-MCNC: 184 MG/DL — HIGH (ref 70–99)
GLUCOSE SERPL-MCNC: 168 MG/DL — HIGH (ref 70–99)
HCT VFR BLD CALC: 41.2 % — SIGNIFICANT CHANGE UP (ref 37–47)
HGB BLD-MCNC: 13.3 G/DL — SIGNIFICANT CHANGE UP (ref 12–16)
MAGNESIUM SERPL-MCNC: 1.8 MG/DL — SIGNIFICANT CHANGE UP (ref 1.8–2.4)
MCHC RBC-ENTMCNC: 27.7 PG — SIGNIFICANT CHANGE UP (ref 27–31)
MCHC RBC-ENTMCNC: 32.3 G/DL — SIGNIFICANT CHANGE UP (ref 32–37)
MCV RBC AUTO: 85.8 FL — SIGNIFICANT CHANGE UP (ref 81–99)
METHOD TYPE: SIGNIFICANT CHANGE UP
NRBC # BLD: 0 /100 WBCS — SIGNIFICANT CHANGE UP (ref 0–0)
PLATELET # BLD AUTO: 339 K/UL — SIGNIFICANT CHANGE UP (ref 130–400)
POTASSIUM SERPL-MCNC: 3.3 MMOL/L — LOW (ref 3.5–5)
POTASSIUM SERPL-SCNC: 3.3 MMOL/L — LOW (ref 3.5–5)
RBC # BLD: 4.8 M/UL — SIGNIFICANT CHANGE UP (ref 4.2–5.4)
RBC # FLD: 13.2 % — SIGNIFICANT CHANGE UP (ref 11.5–14.5)
SODIUM SERPL-SCNC: 144 MMOL/L — SIGNIFICANT CHANGE UP (ref 135–146)
WBC # BLD: 15.65 K/UL — HIGH (ref 4.8–10.8)
WBC # FLD AUTO: 15.65 K/UL — HIGH (ref 4.8–10.8)

## 2020-01-25 PROCEDURE — 99233 SBSQ HOSP IP/OBS HIGH 50: CPT

## 2020-01-25 PROCEDURE — 71045 X-RAY EXAM CHEST 1 VIEW: CPT | Mod: 26

## 2020-01-25 RX ORDER — HEPARIN SODIUM 5000 [USP'U]/ML
5000 INJECTION INTRAVENOUS; SUBCUTANEOUS EVERY 8 HOURS
Refills: 0 | Status: DISCONTINUED | OUTPATIENT
Start: 2020-01-25 | End: 2020-01-26

## 2020-01-25 RX ORDER — LABETALOL HCL 100 MG
100 TABLET ORAL
Refills: 0 | Status: DISCONTINUED | OUTPATIENT
Start: 2020-01-25 | End: 2020-01-27

## 2020-01-25 RX ORDER — POTASSIUM CHLORIDE 20 MEQ
40 PACKET (EA) ORAL EVERY 4 HOURS
Refills: 0 | Status: COMPLETED | OUTPATIENT
Start: 2020-01-25 | End: 2020-01-25

## 2020-01-25 RX ORDER — NIFEDIPINE 30 MG
30 TABLET, EXTENDED RELEASE 24 HR ORAL ONCE
Refills: 0 | Status: COMPLETED | OUTPATIENT
Start: 2020-01-25 | End: 2020-01-25

## 2020-01-25 RX ORDER — NIFEDIPINE 30 MG
60 TABLET, EXTENDED RELEASE 24 HR ORAL DAILY
Refills: 0 | Status: DISCONTINUED | OUTPATIENT
Start: 2020-01-25 | End: 2020-01-27

## 2020-01-25 RX ADMIN — SODIUM CHLORIDE 100 MILLILITER(S): 9 INJECTION, SOLUTION INTRAVENOUS at 04:09

## 2020-01-25 RX ADMIN — Medication 40 MILLIEQUIVALENT(S): at 11:52

## 2020-01-25 RX ADMIN — Medication 500 MILLIGRAM(S): at 14:11

## 2020-01-25 RX ADMIN — Medication 300 MILLIGRAM(S): at 17:08

## 2020-01-25 RX ADMIN — FAMOTIDINE 20 MILLIGRAM(S): 10 INJECTION INTRAVENOUS at 05:28

## 2020-01-25 RX ADMIN — Medication 60 MILLIGRAM(S): at 11:53

## 2020-01-25 RX ADMIN — Medication 100 MILLIGRAM(S): at 17:08

## 2020-01-25 RX ADMIN — HEPARIN SODIUM 5000 UNIT(S): 5000 INJECTION INTRAVENOUS; SUBCUTANEOUS at 14:11

## 2020-01-25 RX ADMIN — Medication 40 MILLIEQUIVALENT(S): at 14:11

## 2020-01-25 RX ADMIN — Medication 500 MILLIGRAM(S): at 23:04

## 2020-01-25 RX ADMIN — Medication 125 MILLIGRAM(S): at 11:53

## 2020-01-25 RX ADMIN — FAMOTIDINE 20 MILLIGRAM(S): 10 INJECTION INTRAVENOUS at 17:08

## 2020-01-25 RX ADMIN — Medication 125 MILLIGRAM(S): at 23:05

## 2020-01-25 RX ADMIN — Medication 125 MILLIGRAM(S): at 05:29

## 2020-01-25 RX ADMIN — Medication 30 MILLIGRAM(S): at 11:52

## 2020-01-25 RX ADMIN — Medication 500 MILLIGRAM(S): at 05:28

## 2020-01-25 RX ADMIN — Medication 125 MILLIGRAM(S): at 17:08

## 2020-01-25 RX ADMIN — Medication 100 MILLIGRAM(S): at 11:52

## 2020-01-25 RX ADMIN — HEPARIN SODIUM 5000 UNIT(S): 5000 INJECTION INTRAVENOUS; SUBCUTANEOUS at 05:28

## 2020-01-25 RX ADMIN — HEPARIN SODIUM 5000 UNIT(S): 5000 INJECTION INTRAVENOUS; SUBCUTANEOUS at 23:04

## 2020-01-25 RX ADMIN — Medication 30 MILLIGRAM(S): at 05:28

## 2020-01-25 RX ADMIN — Medication 300 MILLIGRAM(S): at 05:29

## 2020-01-25 RX ADMIN — Medication 125 MILLIGRAM(S): at 00:04

## 2020-01-25 NOTE — CONSULT NOTE ADULT - SUBJECTIVE AND OBJECTIVE BOX
CARDIOLOGY CONSULT NOTE     CHIEF COMPLAINT/REASON FOR CONSULT:    HPI:  70yo female, currently a resident at a NH and PMH includes pulmonary embolism (on DOAC), presents to the ER for "wound check" as NH reports the wound has cellulitis extending to the right mid and lower abdominal wall along with drainage. Patient first had hernia repair to area in 2011 and subsequently revised in 2013. Son told ER wound has not healed correctly since 2013 procedure (Patient mentions that she has had 4 surgeries to that area). Patient is on vancomycin and meropenem at her facility. Not complaining of pain (23 Jan 2020 07:40)      PAST MEDICAL & SURGICAL HISTORY:  Gastroesophageal reflux disease  Obesity  Diabetes mellitus  HTN (hypertension)  History of cholecystectomy  H/O hernia repair: 2011 and revised in 2013      Cardiac Risks:   [x ]HTN, [ ] DM, [ ] Smoking, [ ] FH,  [ ] Lipids        MEDICATIONS:  MEDICATIONS  (STANDING):  collagenase Ointment 1 Application(s) Topical daily  famotidine Injectable 20 milliGRAM(s) IV Push two times a day  heparin  Injectable 5000 Unit(s) SubCutaneous every 12 hours  lactated ringers 1000 milliLiter(s) (100 mL/Hr) IV Continuous <Continuous>  magnesium gluconate 500 milliGRAM(s) Oral every 8 hours  NIFEdipine XL 30 milliGRAM(s) Oral daily  vancomycin    Solution 125 milliGRAM(s) Oral every 6 hours  vancomycin  IVPB 1500 milliGRAM(s) IV Intermittent every 12 hours      FAMILY HISTORY:      SOCIAL HISTORY:      [ ] Marital status    Allergies    No Known Allergies        	    REVIEW OF SYSTEMS:  CONSTITUTIONAL: No fever, weight loss, or fatigue  EYES: No eye pain, visual disturbances, or discharge  ENMT:  No difficulty hearing, tinnitus, vertigo; No sinus or throat pain  NECK: No pain or stiffness  RESPIRATORY: No cough, wheezing, chills or hemoptysis; No Shortness of Breath  CARDIOVASCULAR: No chest pain, palpitations, passing out, dizziness, or leg swelling  GASTROINTESTINAL: No abdominal or epigastric pain. No nausea, vomiting, or hematemesis; No diarrhea or constipation. No melena or hematochezia.  GENITOURINARY: No dysuria, frequency, hematuria, or incontinence  NEUROLOGICAL: No headaches, memory loss, loss of strength, numbness, or tremors  SKIN: No itching, burning, rashes, or lesions   	      PHYSICAL EXAM:  T(C): 36.7 (01-25-20 @ 04:55), Max: 37.1 (01-24-20 @ 14:41)  HR: 96 (01-25-20 @ 04:55) (67 - 96)  BP: 182/98 (01-25-20 @ 04:55) (178/84 - 197/94)  RR: 16 (01-25-20 @ 04:55) (16 - 18)  SpO2: --  Wt(kg): --  I&O's Summary    24 Jan 2020 07:01  -  25 Jan 2020 07:00  --------------------------------------------------------  IN: 0 mL / OUT: 1100 mL / NET: -1100 mL        Appearance: Normal	  Psychiatry: A & O x 3, Mood & affect appropriate  HEENT:   Normal oral mucosa, PERRL, EOMI	  Lymphatic: No lymphadenopathy  Cardiovascular: Normal S1 S2, irregNo JVD, No murmurs  Respiratory: Lungs clear to auscultation	  Gastrointestinal:  Soft, Non-tender, + BS	  Skin: No rashes, No ecchymoses, No cyanosis	  Neurologic: Non-focal  Extremities: Normal range of motion, No clubbing, cyanosis tr edema  Vascular: Peripheral pulses palpable 2+ bilaterally      ECG:  	not available    	  LABS:	 	    CARDIAC MARKERS:                                    13.3   15.65 )-----------( 339      ( 25 Jan 2020 07:30 )             41.2     01-24    142  |  103  |  11  ----------------------------<  208<H>  3.4<L>   |  23  |  0.6<L>    Ca    10.2<H>      24 Jan 2020 19:20  Mg     1.9     01-24    TPro  5.6<L>  /  Alb  3.2<L>  /  TBili  1.4<H>  /  DBili  x   /  AST  11  /  ALT  10  /  AlkPhos  105  01-23

## 2020-01-25 NOTE — PROGRESS NOTE ADULT - ASSESSMENT
Impression:  68 y/o female with Abdominal wall abscess / s/p Bedside I&D on 1/23/20.        Plan:   - Nursing to continue abdominal wound daily packing and dressing changes.  - Staphylococcus aureus --> final abdominal wound culture and sensitive to Vancomycin.  Continue IV Vanco per I.D.   - Cdiff colitis --> Continue PO Vanco.   - Continue Soft diet as tolerated.   - Cardiology consult reviewed:   Per Cardiology, Patient is a moderate risk for OR.  Recommends EKG and CXR for baseline.  (Hospitalist aware and he already ordered).   - Hypokalemia - being supplemented by Hospitalist.  F/U repeat BMP.   - Hypertension (poorly controlled) --> Hospitalist increased Procardia to 60 and added Labetalol 100 milligrams twice daily.    - Follow up pre-operative labs, CXR and EKG.  - Will make NPO after midnight on Sunday for I&D of abdominal wall abscess in O.R. by Dr. Banda on Monday 1/27/20.  - Case d/w Dr. Banda.

## 2020-01-25 NOTE — PROGRESS NOTE ADULT - SUBJECTIVE AND OBJECTIVE BOX
Progress Note:  Provider Speciality                            Hospitalist      PATRICIA SPRAGUE [id number] 69y Female     CHIEF PRESENTING COMPLAINT:  Patient is a 69y old  Female who presents with a chief complaint of abdominal wall abscess (24 Jan 2020 11:39)        SUBJECTIVE:  Patient was seen and examined at bedside. Reports one episode on non-bloody emesis   No significant overnight events reported.     HISTORY OF PRESENTING ILLNESS:  HPI:  70yo female, currently a resident at a NH and PMH includes pulmonary embolism (on DOAC), presents to the ER for "wound check" as NH reports the wound has cellulitis extending to the right mid and lower abdominal wall along with drainage. Patient first had hernia repair to area in 2011 and subsequently revised in 2013. Son told ER wound has not healed correctly since 2013 procedure (Patient mentions that she has had 4 surgeries to that area). Patient is on vancomycin and meropenem at her facility. Not complaining of pain (23 Jan 2020 07:40)        REVIEW OF SYSTEMS:  Patient denies any headache, any vision complaints, runny nose, fever, chills, sore throat. Denies chest pain, shortness of breath, palpitation. Denies  abdominal pain, diarrhoea, Denies urinary burning, urgency, frequency, dysuria. Denies weakness in any part of the body or numbness.   At least 10 systems were reviewed in ROS. All systems reviewed  are within normal limits except for the complaints as described in Subjective.    PAST MEDICAL & SURGICAL HISTORY:  PAST MEDICAL & SURGICAL HISTORY:  Gastroesophageal reflux disease  Obesity  Diabetes mellitus  HTN (hypertension)  History of cholecystectomy  H/O hernia repair: 2011 and revised in 2013          VITAL SIGNS:  Vital Signs Last 24 Hrs  T(C): 36.7 (25 Jan 2020 04:55), Max: 37.1 (24 Jan 2020 14:41)  T(F): 98.1 (25 Jan 2020 04:55), Max: 98.8 (24 Jan 2020 14:41)  HR: 96 (25 Jan 2020 04:55) (67 - 96)  BP: 182/98 (25 Jan 2020 04:55) (178/84 - 197/94)  BP(mean): --  RR: 16 (25 Jan 2020 04:55) (16 - 18)  SpO2: --          PHYSICAL EXAMINATION:  Not in acute distress, obese  General: No pallor, no icterus  HEENT:   EOMI, no JVD,.  Heart: S1+S2 audible  Lungs: bilateral  fair air entry, no wheezing, no crepitations.  Abdomen: large erythematous area right abdomen/flank, covered by dressing intact dry , Soft, non-tender, non-distended , no  rigidity or guarding.  CNS: AAOx3, CN  grossly intact.  Extremities:  No edema            CONSULTS:  Consultant(s) Notes Reviewed by me.   Care Discussed with Consultants/Other Providers where required.        MEDICATIONS:  MEDICATIONS  (STANDING):  collagenase Ointment 1 Application(s) Topical daily  famotidine Injectable 20 milliGRAM(s) IV Push two times a day  heparin  Injectable 5000 Unit(s) SubCutaneous every 8 hours  labetalol 100 milliGRAM(s) Oral two times a day  lactated ringers 1000 milliLiter(s) (100 mL/Hr) IV Continuous <Continuous>  magnesium gluconate 500 milliGRAM(s) Oral every 8 hours  NIFEdipine XL 60 milliGRAM(s) Oral daily  NIFEdipine XL 30 milliGRAM(s) Oral once  potassium chloride    Tablet ER 40 milliEquivalent(s) Oral every 4 hours  vancomycin    Solution 125 milliGRAM(s) Oral every 6 hours  vancomycin  IVPB 1500 milliGRAM(s) IV Intermittent every 12 hours    MEDICATIONS  (PRN):  acetaminophen   Tablet .. 650 milliGRAM(s) Oral every 6 hours PRN Mild Pain (1 - 3)  morphine  - Injectable 2 milliGRAM(s) IV Push every 4 hours PRN Mild Pain (1 - 3)  ondansetron Injectable 8 milliGRAM(s) IV Push every 8 hours PRN Nausea and/or Vomiting  oxycodone    5 mG/acetaminophen 325 mG 2 Tablet(s) Oral every 6 hours PRN Moderate Pain (4 - 6)            ASSESSMENT:        70yo female, currently a resident at a NH and PMH includes pulmonary embolism (on DOAC), HTN, DM presents to the ER for "wound check" as NH reports the wound has cellulitis extending to the right mid and lower     abdominal wall along with drainage. Patient first had hernia repair to area in 2011 and subsequently revised in 2013. Son told ER wound has not healed correctly since 2013 procedure (Patient mentions that she has had     4 surgeries to that area). Patient is on vancomycin and meropenem at her facility.     ASSESSMENT:  Principal Diagnosis:  cellulitis and abscess of right flank wound  Active C diff colitis  Supected GI bleeding  Hypokalemia     Associated Active Comorbid Conditions:  Morbid obesity. BMI 40  Diabetes mellitis type 2   Hypertension     PLAN:    - start on soft diet  -Plan for OR on Monday 1/27 for I&D abdominal wall abscess  - Continue with IV vanco for Cellulitis . Will require 3 weeks of IV antibiotics   - Continue with PO vanco for C diff colitis x 14 days  - Wound cx MSSA  - low suspicion for active gastrointestinal bleeding - hemoglobin/hematocrit very stable   - Hypokalemia - being supplemented  -Hypertension - poorly controlled. Increased procardia to 60 and addded Labetalol 100 milligrams twice daily   -Insulin sliding scale with coverage with meals and QHS   - Morbid obesity-weight & dietery management   - moderate risk for OR as per cardiology. Will get EKG and CXR for baseline today  -GI Prophylaxis: Pepcid twice daily  -DVT Prophylaxis: Heparin 5000 units sc q8hrs

## 2020-01-25 NOTE — CONSULT NOTE ADULT - ASSESSMENT
Patient with above history. She denies chest pain or sob, But not active  She has afib on xarelto in past. Patient inactive. Risk surgery moderate. Note possible monica. Check ekg cxr. Correct k. Watch for chf. Prognosis guarded

## 2020-01-25 NOTE — PROGRESS NOTE ADULT - SUBJECTIVE AND OBJECTIVE BOX
.  Patient resting comfortable in bed.  Had one episode of vomitus overnight.  None today.   Patient tolerates diet well.  + Flatus, + BM.    Patient denies subjective fever, chills, tremors, N/V/D, CP or SOB.         I&O's Detail    24 Jan 2020 07:01  -  25 Jan 2020 07:00  --------------------------------------------------------  IN:  Total IN: 0 mL    OUT:    Voided: 1100 mL  Total OUT: 1100 mL    Total NET: -1100 mL          MEDICATIONS  (STANDING):  collagenase Ointment 1 Application(s) Topical daily  famotidine Injectable 20 milliGRAM(s) IV Push two times a day  heparin  Injectable 5000 Unit(s) SubCutaneous every 8 hours  labetalol 100 milliGRAM(s) Oral two times a day  lactated ringers 1000 milliLiter(s) (100 mL/Hr) IV Continuous <Continuous>  magnesium gluconate 500 milliGRAM(s) Oral every 8 hours  NIFEdipine XL 60 milliGRAM(s) Oral daily  vancomycin    Solution 125 milliGRAM(s) Oral every 6 hours  vancomycin  IVPB 1500 milliGRAM(s) IV Intermittent every 12 hours    MEDICATIONS  (PRN):  acetaminophen   Tablet .. 650 milliGRAM(s) Oral every 6 hours PRN Mild Pain (1 - 3)  morphine  - Injectable 2 milliGRAM(s) IV Push every 4 hours PRN Mild Pain (1 - 3)  ondansetron Injectable 8 milliGRAM(s) IV Push every 8 hours PRN Nausea and/or Vomiting  oxycodone    5 mG/acetaminophen 325 mG 2 Tablet(s) Oral every 6 hours PRN Moderate Pain (4 - 6)          Vital Signs Last 24 Hrs  T(C): 36.4 (25 Jan 2020 14:28), Max: 37.1 (24 Jan 2020 14:41)  T(F): 97.5 (25 Jan 2020 14:28), Max: 98.8 (24 Jan 2020 14:41)  HR: 82 (25 Jan 2020 14:28) (67 - 96)  BP: 185/99 (25 Jan 2020 14:28) (178/84 - 197/94)  RR: 16 (25 Jan 2020 14:28) (16 - 18)          Physical Exam:  General:  WD, Obese in NAD.   Chest:  Clear to auscultation bilaterally, Equal expansion bilaterally, equal breath sounds, No W/R/R.  CV:  S1 & S2, RRR, No M/R/G.   Abdomen:  + Bowel sounds,  large erythematous area right abdomen/flank area with dressing and packing in place. Soft, non-tender, non-distended , no rigidity or guarding.          LABS:                        13.3   15.65 )-----------( 339      ( 25 Jan 2020 07:30 )             41.2     01-25    144  |  104  |  14  ----------------------------<  168<H>  3.3<L>   |  26  |  0.6<L>    Ca    10.3<H>      25 Jan 2020 07:30  Mg     1.8     01-25          Culture - Abscess with Gram Stain (01.23.20 @ 00:10)    -  Trimethoprim/Sulfamethoxazole: S <=0.5/9.5    -  Erythromycin: S <=0.25    -  Vancomycin: S 2    -  Tetra/Doxy: S <=1    -  RIF- Rifampin: S <=1 Should not be used as monotherapy    -  Oxacillin: S <=0.25    -  Gentamicin: S <=1 Should not be used as monotherapy    -  Ampicillin/Sulbactam: S <=8/4    -  Clindamycin: S <=0.25    -  Cefazolin: S <=4    Specimen Source: .Abscess right abdominal wall abscess      Culture Results:   Few Staphylococcus aureus    Organism Identification: Staphylococcus aureus    Organism: Staphylococcus aureus

## 2020-01-26 LAB
ANION GAP SERPL CALC-SCNC: 15 MMOL/L — HIGH (ref 7–14)
BUN SERPL-MCNC: 9 MG/DL — LOW (ref 10–20)
CALCIUM SERPL-MCNC: 9.9 MG/DL — SIGNIFICANT CHANGE UP (ref 8.5–10.1)
CHLORIDE SERPL-SCNC: 98 MMOL/L — SIGNIFICANT CHANGE UP (ref 98–110)
CO2 SERPL-SCNC: 26 MMOL/L — SIGNIFICANT CHANGE UP (ref 17–32)
CREAT SERPL-MCNC: 0.5 MG/DL — LOW (ref 0.7–1.5)
GLUCOSE BLDC GLUCOMTR-MCNC: 116 MG/DL — HIGH (ref 70–99)
GLUCOSE BLDC GLUCOMTR-MCNC: 118 MG/DL — HIGH (ref 70–99)
GLUCOSE BLDC GLUCOMTR-MCNC: 119 MG/DL — HIGH (ref 70–99)
GLUCOSE BLDC GLUCOMTR-MCNC: 157 MG/DL — HIGH (ref 70–99)
GLUCOSE SERPL-MCNC: 179 MG/DL — HIGH (ref 70–99)
HCT VFR BLD CALC: 40.5 % — SIGNIFICANT CHANGE UP (ref 37–47)
HGB BLD-MCNC: 13 G/DL — SIGNIFICANT CHANGE UP (ref 12–16)
MAGNESIUM SERPL-MCNC: 1.6 MG/DL — LOW (ref 1.8–2.4)
MCHC RBC-ENTMCNC: 27.7 PG — SIGNIFICANT CHANGE UP (ref 27–31)
MCHC RBC-ENTMCNC: 32.1 G/DL — SIGNIFICANT CHANGE UP (ref 32–37)
MCV RBC AUTO: 86.4 FL — SIGNIFICANT CHANGE UP (ref 81–99)
NRBC # BLD: 0 /100 WBCS — SIGNIFICANT CHANGE UP (ref 0–0)
PLATELET # BLD AUTO: 331 K/UL — SIGNIFICANT CHANGE UP (ref 130–400)
POTASSIUM SERPL-MCNC: 3.5 MMOL/L — SIGNIFICANT CHANGE UP (ref 3.5–5)
POTASSIUM SERPL-SCNC: 3.5 MMOL/L — SIGNIFICANT CHANGE UP (ref 3.5–5)
RBC # BLD: 4.69 M/UL — SIGNIFICANT CHANGE UP (ref 4.2–5.4)
RBC # FLD: 13.1 % — SIGNIFICANT CHANGE UP (ref 11.5–14.5)
SODIUM SERPL-SCNC: 139 MMOL/L — SIGNIFICANT CHANGE UP (ref 135–146)
WBC # BLD: 14.66 K/UL — HIGH (ref 4.8–10.8)
WBC # FLD AUTO: 14.66 K/UL — HIGH (ref 4.8–10.8)

## 2020-01-26 PROCEDURE — 99233 SBSQ HOSP IP/OBS HIGH 50: CPT

## 2020-01-26 RX ORDER — ENOXAPARIN SODIUM 100 MG/ML
100 INJECTION SUBCUTANEOUS
Refills: 0 | Status: COMPLETED | OUTPATIENT
Start: 2020-01-26 | End: 2020-01-26

## 2020-01-26 RX ORDER — POTASSIUM CHLORIDE 20 MEQ
40 PACKET (EA) ORAL ONCE
Refills: 0 | Status: COMPLETED | OUTPATIENT
Start: 2020-01-26 | End: 2020-01-26

## 2020-01-26 RX ADMIN — Medication 500 MILLIGRAM(S): at 14:03

## 2020-01-26 RX ADMIN — Medication 60 MILLIGRAM(S): at 05:15

## 2020-01-26 RX ADMIN — Medication 30 MILLILITER(S): at 14:02

## 2020-01-26 RX ADMIN — ENOXAPARIN SODIUM 100 MILLIGRAM(S): 100 INJECTION SUBCUTANEOUS at 17:13

## 2020-01-26 RX ADMIN — Medication 500 MILLIGRAM(S): at 21:18

## 2020-01-26 RX ADMIN — Medication 300 MILLIGRAM(S): at 17:12

## 2020-01-26 RX ADMIN — Medication 100 MILLIGRAM(S): at 05:15

## 2020-01-26 RX ADMIN — Medication 300 MILLIGRAM(S): at 05:15

## 2020-01-26 RX ADMIN — Medication 125 MILLIGRAM(S): at 23:17

## 2020-01-26 RX ADMIN — SODIUM CHLORIDE 100 MILLILITER(S): 9 INJECTION, SOLUTION INTRAVENOUS at 11:01

## 2020-01-26 RX ADMIN — Medication 40 MILLIEQUIVALENT(S): at 14:03

## 2020-01-26 RX ADMIN — Medication 125 MILLIGRAM(S): at 05:16

## 2020-01-26 RX ADMIN — FAMOTIDINE 20 MILLIGRAM(S): 10 INJECTION INTRAVENOUS at 05:15

## 2020-01-26 RX ADMIN — FAMOTIDINE 20 MILLIGRAM(S): 10 INJECTION INTRAVENOUS at 17:13

## 2020-01-26 RX ADMIN — Medication 125 MILLIGRAM(S): at 17:12

## 2020-01-26 RX ADMIN — Medication 125 MILLIGRAM(S): at 11:01

## 2020-01-26 RX ADMIN — Medication 100 MILLIGRAM(S): at 17:12

## 2020-01-26 RX ADMIN — SODIUM CHLORIDE 100 MILLILITER(S): 9 INJECTION, SOLUTION INTRAVENOUS at 23:17

## 2020-01-26 RX ADMIN — HEPARIN SODIUM 5000 UNIT(S): 5000 INJECTION INTRAVENOUS; SUBCUTANEOUS at 05:14

## 2020-01-26 RX ADMIN — Medication 500 MILLIGRAM(S): at 05:15

## 2020-01-26 RX ADMIN — Medication 30 MILLILITER(S): at 01:55

## 2020-01-26 NOTE — PROGRESS NOTE ADULT - SUBJECTIVE AND OBJECTIVE BOX
Preoperative note:      Preoperative diagnosis:  Abdominal wall abscess / s/p Bedside I&D on 1/23/20    Planned Procedure:  Exploration    Surgeon:    Labs:    CXR:    EKG:    Orders:  - NPO after midnight except medications.  - Consent to obtained by attending in morning. Preoperative note:      Preoperative diagnosis:   Abdominal wall abscess / s/p Bedside I&D on 1/23/20.    Planned Procedure:   Exploration of abdominal wall abscess with I&D of abscess.    Surgeon:  Dr. Banda.     Labs:                          13.0   14.66 )-----------( 331      ( 26 Jan 2020 07:41 )             40.5       01-26    139  |  98  |  9<L>  ----------------------------<  179<H>  3.5   |  26  |  0.5<L>    Ca    9.9      26 Jan 2020 07:41  Mg     1.6     01-26            Lactate Trend  01-23 @ 00:10 Lactate:1.7       POCT Blood Glucose.: 157 mg/dL (26 Jan 2020 07:09)        Culture Results:   No growth to date. (01-23 @ 00:10)  Culture Results:   No growth to date. (01-23 @ 00:10)  Culture Results:   Few Staphylococcus aureus (01-23 @ 00:10)        CXR:    Xray Chest 1 View-PORTABLE IMMEDIATE (01.25.20 @ 14:35)   EXAM:  XR CHEST PORTABLE IMMED 1V            PROCEDURE DATE:  01/25/2020        INTERPRETATION:  Clinical History / Reason for exam: Preoperative evaluation    Comparison : Chest radiograph 12/7/2019.    Technique/Positioning: Single portable AP radiograph the chest.    Findings:    Support devices: None.    Cardiac/mediastinum/hilum: Unchanged    Lung parenchyma/Pleura: No focal consolidation, pleural effusion or pneumothorax    Skeleton/soft tissues: Degenerative changes of the spine    Impression:      No radiographic evidence of acute cardiopulmonary disease.            EKG:    Results pending for EKG completed on 1/25/20.      12 Lead ECG (12.18.19 @ 15:40)   Ventricular Rate 55 BPM    Atrial Rate 277 BPM    QRS Duration 114 ms    Q-T Interval 426 ms    QTC Calculation(Bezet) 407 ms    R Axis -14 degrees    T Axis 26 degrees    Diagnosis Line Atrial fibrillation with slow ventricular response  Poor R wave progression    Confirmed by REINA LING MD [id number]) on 12/19/2019 12:07:15 PM              Orders:  - NPO after midnight except medications.  - Will replete Potassium and Magnesium prior to planned procedure tomorrow.   - Cardiology consult reviewed and patient is moderate risk for surgery.    - Awaiting medical clearance for planned procedure.  Follow repeat EKG from 1/25/20.  - Xarelto being held.   - Consent to obtained by attending in morning for Exploration of abdominal wall abscess with I&D of abscess.  - Case d/w Dr. Banda. Preoperative note:      Preoperative diagnosis:   Abdominal wall abscess / s/p Bedside I&D on 1/23/20.    Planned Procedure:   Exploration of abdominal wall abscess with I&D of abscess.    Surgeon:  Dr. Banda.     Labs:                          13.0   14.66 )-----------( 331      ( 26 Jan 2020 07:41 )             40.5       01-26    139  |  98  |  9<L>  ----------------------------<  179<H>  3.5   |  26  |  0.5<L>    Ca    9.9      26 Jan 2020 07:41  Mg     1.6     01-26            Lactate Trend  01-23 @ 00:10 Lactate:1.7       POCT Blood Glucose.: 157 mg/dL (26 Jan 2020 07:09)        Culture Results:   No growth to date. (01-23 @ 00:10)  Culture Results:   No growth to date. (01-23 @ 00:10)  Culture Results:   Few Staphylococcus aureus (01-23 @ 00:10)        CXR:    Xray Chest 1 View-PORTABLE IMMEDIATE (01.25.20 @ 14:35)   EXAM:  XR CHEST PORTABLE IMMED 1V            PROCEDURE DATE:  01/25/2020        INTERPRETATION:  Clinical History / Reason for exam: Preoperative evaluation    Comparison : Chest radiograph 12/7/2019.    Technique/Positioning: Single portable AP radiograph the chest.    Findings:    Support devices: None.    Cardiac/mediastinum/hilum: Unchanged    Lung parenchyma/Pleura: No focal consolidation, pleural effusion or pneumothorax    Skeleton/soft tissues: Degenerative changes of the spine    Impression:      No radiographic evidence of acute cardiopulmonary disease.            EKG:    Results pending for EKG completed on 1/25/20.      12 Lead ECG (12.18.19 @ 15:40)   Ventricular Rate 55 BPM    Atrial Rate 277 BPM    QRS Duration 114 ms    Q-T Interval 426 ms    QTC Calculation(Bezet) 407 ms    R Axis -14 degrees    T Axis 26 degrees    Diagnosis Line Atrial fibrillation with slow ventricular response  Poor R wave progression    Confirmed by REINA LING MD ([id number]) on 12/19/2019 12:07:15 PM .  Preoperative note:      Preoperative diagnosis:   Abdominal wall abscess / s/p Bedside I&D on 1/23/20.    Planned Procedure:   Exploration of abdominal wall abscess with I&D of abscess.    Surgeon:  Dr. Banda.     Labs:                          13.0   14.66 )-----------( 331      ( 26 Jan 2020 07:41 )             40.5       01-26    139  |  98  |  9<L>  ----------------------------<  179<H>  3.5   |  26  |  0.5<L>    Ca    9.9      26 Jan 2020 07:41  Mg     1.6     01-26            Lactate Trend  01-23 @ 00:10 Lactate:1.7       POCT Blood Glucose.: 157 mg/dL (26 Jan 2020 07:09)        Culture Results:   No growth to date. (01-23 @ 00:10)  Culture Results:   No growth to date. (01-23 @ 00:10)  Culture Results:   Few Staphylococcus aureus (01-23 @ 00:10)        CXR:    Xray Chest 1 View-PORTABLE IMMEDIATE (01.25.20 @ 14:35)   EXAM:  XR CHEST PORTABLE IMMED 1V            PROCEDURE DATE:  01/25/2020        INTERPRETATION:  Clinical History / Reason for exam: Preoperative evaluation    Comparison : Chest radiograph 12/7/2019.    Technique/Positioning: Single portable AP radiograph the chest.    Findings:    Support devices: None.    Cardiac/mediastinum/hilum: Unchanged    Lung parenchyma/Pleura: No focal consolidation, pleural effusion or pneumothorax    Skeleton/soft tissues: Degenerative changes of the spine    Impression:      No radiographic evidence of acute cardiopulmonary disease.            EKG:    Results pending for EKG completed on 1/25/20.      12 Lead ECG (12.18.19 @ 15:40)   Ventricular Rate 55 BPM    Atrial Rate 277 BPM    QRS Duration 114 ms    Q-T Interval 426 ms    QTC Calculation(Bezet) 407 ms    R Axis -14 degrees    T Axis 26 degrees    Diagnosis Line Atrial fibrillation with slow ventricular response  Poor R wave progression    Confirmed by REINA LING MD [id number]) on 12/19/2019 12:07:15 PM

## 2020-01-26 NOTE — PROGRESS NOTE ADULT - ASSESSMENT
Impression:    Abdominal wall abscess / s/p bedside I&D on 1/23/20      Orders:  - NPO after midnight except medications.  - Case d/w Dr. Gongora and states ok to replete Potassium with Kdur 40 meq x 1 and continue Magnesium gluconate as ordered.    - Cardiology consult reviewed and patient is moderate risk for surgery.    - Medical clearance obtained and is moderate risk for planned procedure.  (See hospitalist note).   - Xarelto being held and Dr. Gongora states he started Lovenox and will hold after am dose due to history of P.E.    - Continue Ivabx with IV Vancomycin for abdominal wall abscess.  - Continue PO Vancomycin for Cdiff.   - Consent to obtained by attending in morning for Exploration of abdominal wall abscess with I&D of abscess.  - Case d/w Dr. Banda.

## 2020-01-26 NOTE — PROGRESS NOTE ADULT - SUBJECTIVE AND OBJECTIVE BOX
Progress Note:  Provider Speciality                            Hospitalist      PATRICIA SPRAGUE MRN-[id number] 69y Female     CHIEF PRESENTING COMPLAINT:  Patient is a 69y old  Female who presents with a chief complaint of abdominal wall abscess (24 Jan 2020 11:39)        SUBJECTIVE:  Patient was seen and examined at bedside. Reports no complaint of  emesis today   No significant overnight events reported.     HISTORY OF PRESENTING ILLNESS:  HPI:  70yo female, currently a resident at a NH and PMH includes pulmonary embolism (on DOAC), presents to the ER for "wound check" as NH reports the wound has cellulitis extending to the right mid and lower abdominal wall along with drainage. Patient first had hernia repair to area in 2011 and subsequently revised in 2013. Son told ER wound has not healed correctly since 2013 procedure (Patient mentions that she has had 4 surgeries to that area). Patient is on vancomycin and meropenem at her facility. Not complaining of pain (23 Jan 2020 07:40)        REVIEW OF SYSTEMS:  Patient denies any headache, any vision complaints, runny nose, fever, chills, sore throat. Denies chest pain, shortness of breath, palpitation. Denies  abdominal pain, diarrhoea, Denies urinary burning, urgency, frequency, dysuria. Denies weakness in any part of the body or numbness.   At least 10 systems were reviewed in ROS. All systems reviewed  are within normal limits except for the complaints as described in Subjective.    PAST MEDICAL & SURGICAL HISTORY:  PAST MEDICAL & SURGICAL HISTORY:  Gastroesophageal reflux disease  Obesity  Diabetes mellitus  HTN (hypertension)  History of cholecystectomy  H/O hernia repair: 2011 and revised in 2013          VITAL SIGNS:  Vital Signs Last 24 Hrs  T(C): 36.5 (26 Jan 2020 05:17), Max: 36.6 (25 Jan 2020 21:07)  T(F): 97.7 (26 Jan 2020 05:17), Max: 97.9 (25 Jan 2020 21:07)  HR: 95 (26 Jan 2020 05:17) (71 - 95)  BP: 159/98 (26 Jan 2020 05:17) (159/98 - 185/99)  BP(mean): --  RR: 18 (26 Jan 2020 05:17) (16 - 18)  SpO2: --      PHYSICAL EXAMINATION:  Not in acute distress, obese  General: No pallor, no icterus  HEENT:   EOMI, no JVD,.  Heart: S1+S2 audible, irregular heart rate   Lungs: bilateral  fair air entry, no wheezing, no crepitations.  Abdomen: large erythematous area right abdomen/flank, covered by dressing intact dry , Soft, non-tender, non-distended , no  rigidity or guarding.  CNS: AAOx3, CN  grossly intact.  Extremities:  No edema            CONSULTS:  Consultant(s) Notes Reviewed by me.   Care Discussed with Consultants/Other Providers where required.        MEDICATIONS:  MEDICATIONS  (STANDING):  collagenase Ointment 1 Application(s) Topical daily  famotidine Injectable 20 milliGRAM(s) IV Push two times a day  heparin  Injectable 5000 Unit(s) SubCutaneous every 8 hours  labetalol 100 milliGRAM(s) Oral two times a day  lactated ringers 1000 milliLiter(s) (100 mL/Hr) IV Continuous <Continuous>  magnesium gluconate 500 milliGRAM(s) Oral every 8 hours  NIFEdipine XL 60 milliGRAM(s) Oral daily  NIFEdipine XL 30 milliGRAM(s) Oral once  potassium chloride    Tablet ER 40 milliEquivalent(s) Oral every 4 hours  vancomycin    Solution 125 milliGRAM(s) Oral every 6 hours  vancomycin  IVPB 1500 milliGRAM(s) IV Intermittent every 12 hours    MEDICATIONS  (PRN):  acetaminophen   Tablet .. 650 milliGRAM(s) Oral every 6 hours PRN Mild Pain (1 - 3)  morphine  - Injectable 2 milliGRAM(s) IV Push every 4 hours PRN Mild Pain (1 - 3)  ondansetron Injectable 8 milliGRAM(s) IV Push every 8 hours PRN Nausea and/or Vomiting  oxycodone    5 mG/acetaminophen 325 mG 2 Tablet(s) Oral every 6 hours PRN Moderate Pain (4 - 6)            ASSESSMENT:        70yo female, currently a resident at a NH and PMH includes pulmonary embolism (on DOAC), HTN, DM presents to the ER for "wound check" as NH reports the wound has cellulitis extending to the right mid and lower     abdominal wall along with drainage. Patient first had hernia repair to area in 2011 and subsequently revised in 2013. Son told ER wound has not healed correctly since 2013 procedure (Patient mentions that she has had     4 surgeries to that area). Patient is on vancomycin and meropenem at her facility.     ASSESSMENT:  Principal Diagnosis:  cellulitis and abscess of right flank wound  Active C diff colitis  Suspected GI bleeding  Hypokalemia     Associated Active Comorbid Conditions:  Morbid obesity. BMI 40  Diabetes mellitis type 2   Hypertension   chronic aifb     PLAN:    - NPO after midnight . Resume diet for daytime today  -Plan for OR on Monday 1/27 for I&D abdominal wall abscess  - Continue with IV vanco for Cellulitis . Will require 3 weeks of IV antibiotics   - Continue with PO vanco for C diff colitis x 14 days  - Wound cx MSSA  - low suspicion for active gastrointestinal bleeding - hemoglobin/hematocrit very stable   - Hypokalemia - being supplemented  -Chronic aifb  with history of Pulmonary Embolism-rate controlled. Holding Xarelto. Will cover with lovenox therapeutic dose. Will hold AM dose of Lovenox and resume xarelto after OR when cleared by surgery  -Hypertension - overall poorly controlled. Increased procardia to 60 and addded Labetalol 100 milligrams twice daily . better today  -Insulin sliding scale with coverage with meals and QHS   - Morbid obesity-weight & dietary management   - Patient is medically cleared with moderate risk for OR  -GI Prophylaxis: Pepcid twice daily  -DVT Prophylaxis: Lovenox  Pending: OR tomorrow  Discharge Disposition: NH when stable Progress Note:  Provider Speciality                            Hospitalist      PATRICIA SPRAGUE MRN-[id number] 69y Female     CHIEF PRESENTING COMPLAINT:  Patient is a 69y old  Female who presents with a chief complaint of abdominal wall abscess (24 Jan 2020 11:39)        SUBJECTIVE:  Patient was seen and examined at bedside. Reports no complaint of  emesis today   No significant overnight events reported.     HISTORY OF PRESENTING ILLNESS:  HPI:  68yo female, currently a resident at a NH and PMH includes pulmonary embolism (on DOAC), presents to the ER for "wound check" as NH reports the wound has cellulitis extending to the right mid and lower abdominal wall along with drainage. Patient first had hernia repair to area in 2011 and subsequently revised in 2013. Son told ER wound has not healed correctly since 2013 procedure (Patient mentions that she has had 4 surgeries to that area). Patient is on vancomycin and meropenem at her facility. Not complaining of pain (23 Jan 2020 07:40)        REVIEW OF SYSTEMS:  Patient denies any headache, any vision complaints, runny nose, fever, chills, sore throat. Denies chest pain, shortness of breath, palpitation. Denies  abdominal pain, diarrhoea, Denies urinary burning, urgency, frequency, dysuria. Denies weakness in any part of the body or numbness.   At least 10 systems were reviewed in ROS. All systems reviewed  are within normal limits except for the complaints as described in Subjective.    PAST MEDICAL & SURGICAL HISTORY:  PAST MEDICAL & SURGICAL HISTORY:  Gastroesophageal reflux disease  Obesity  Diabetes mellitus  HTN (hypertension)  History of cholecystectomy  H/O hernia repair: 2011 and revised in 2013          VITAL SIGNS:  Vital Signs Last 24 Hrs  T(C): 36.5 (26 Jan 2020 05:17), Max: 36.6 (25 Jan 2020 21:07)  T(F): 97.7 (26 Jan 2020 05:17), Max: 97.9 (25 Jan 2020 21:07)  HR: 95 (26 Jan 2020 05:17) (71 - 95)  BP: 159/98 (26 Jan 2020 05:17) (159/98 - 185/99)  BP(mean): --  RR: 18 (26 Jan 2020 05:17) (16 - 18)  SpO2: --      PHYSICAL EXAMINATION:  Not in acute distress, obese  General: No pallor, no icterus  HEENT:   EOMI, no JVD,.  Heart: S1+S2 audible, irregular heart rate   Lungs: bilateral  fair air entry, no wheezing, no crepitations.  Abdomen: large erythematous area right abdomen/flank, covered by dressing intact dry , Soft, non-tender, non-distended , no  rigidity or guarding.  CNS: AAOx3, CN  grossly intact.  Extremities:  No edema            CONSULTS:  Consultant(s) Notes Reviewed by me.   Care Discussed with Consultants/Other Providers where required.        MEDICATIONS:  MEDICATIONS  (STANDING):  collagenase Ointment 1 Application(s) Topical daily  famotidine Injectable 20 milliGRAM(s) IV Push two times a day  heparin  Injectable 5000 Unit(s) SubCutaneous every 8 hours  labetalol 100 milliGRAM(s) Oral two times a day  lactated ringers 1000 milliLiter(s) (100 mL/Hr) IV Continuous <Continuous>  magnesium gluconate 500 milliGRAM(s) Oral every 8 hours  NIFEdipine XL 60 milliGRAM(s) Oral daily  NIFEdipine XL 30 milliGRAM(s) Oral once  potassium chloride    Tablet ER 40 milliEquivalent(s) Oral every 4 hours  vancomycin    Solution 125 milliGRAM(s) Oral every 6 hours  vancomycin  IVPB 1500 milliGRAM(s) IV Intermittent every 12 hours    MEDICATIONS  (PRN):  acetaminophen   Tablet .. 650 milliGRAM(s) Oral every 6 hours PRN Mild Pain (1 - 3)  morphine  - Injectable 2 milliGRAM(s) IV Push every 4 hours PRN Mild Pain (1 - 3)  ondansetron Injectable 8 milliGRAM(s) IV Push every 8 hours PRN Nausea and/or Vomiting  oxycodone    5 mG/acetaminophen 325 mG 2 Tablet(s) Oral every 6 hours PRN Moderate Pain (4 - 6)            ASSESSMENT:        68yo female, currently a resident at a NH and PMH includes pulmonary embolism (on DOAC), HTN, DM presents to the ER for "wound check" as NH reports the wound has cellulitis extending to the right mid and lower     abdominal wall along with drainage. Patient first had hernia repair to area in 2011 and subsequently revised in 2013. Son told ER wound has not healed correctly since 2013 procedure (Patient mentions that she has had     4 surgeries to that area). Patient is on vancomycin and meropenem at her facility.     ASSESSMENT:  Principal Diagnosis:  cellulitis and abscess of right flank wound  Active C diff colitis  Suspected GI bleeding  Hypokalemia & Hypomagnesemia     Associated Active Comorbid Conditions:  Morbid obesity. BMI 40  Diabetes mellitis type 2   Hypertension   chronic aifb     PLAN:    - NPO after midnight . Resume diet for daytime today  -Plan for OR on Monday 1/27 for I&D abdominal wall abscess  - Continue with IV vanco for Cellulitis . Will require 3 weeks of IV antibiotics   - Continue with PO vanco for C diff colitis x 14 days  - Wound cx MSSA  - low suspicion for active gastrointestinal bleeding - hemoglobin/hematocrit very stable   - Hypokalemia - being supplemented  -Chronic aifb  with history of Pulmonary Embolism-rate controlled. Holding Xarelto. Will cover with lovenox therapeutic dose. Will hold AM dose of Lovenox and resume xarelto after OR when cleared by surgery  -Hypertension - overall poorly controlled. Increased procardia to 60 and addded Labetalol 100 milligrams twice daily . better today  -Insulin sliding scale with coverage with meals and QHS   - Morbid obesity-weight & dietary management   - Patient is medically cleared with moderate risk for OR  -GI Prophylaxis: Pepcid twice daily  -DVT Prophylaxis: Lovenox  Pending: OR tomorrow  Discharge Disposition: NH when stable

## 2020-01-26 NOTE — PROGRESS NOTE ADULT - ATTENDING COMMENTS
above noted abdomen soft no distension ct scan noted abscess abdominal wall for I/D  pt fully examined by me and agree with above
above noted abdomen soft ulcer same for debridment on monday
above noted discussed case with surgical resident for debridment on monday
above noted discussed case with surgical resident for debridment in am

## 2020-01-27 LAB
ANION GAP SERPL CALC-SCNC: 11 MMOL/L — SIGNIFICANT CHANGE UP (ref 7–14)
BUN SERPL-MCNC: 11 MG/DL — SIGNIFICANT CHANGE UP (ref 10–20)
CALCIUM SERPL-MCNC: 10.2 MG/DL — HIGH (ref 8.5–10.1)
CHLORIDE SERPL-SCNC: 97 MMOL/L — LOW (ref 98–110)
CO2 SERPL-SCNC: 28 MMOL/L — SIGNIFICANT CHANGE UP (ref 17–32)
CREAT SERPL-MCNC: 0.5 MG/DL — LOW (ref 0.7–1.5)
GLUCOSE BLDC GLUCOMTR-MCNC: 110 MG/DL — HIGH (ref 70–99)
GLUCOSE BLDC GLUCOMTR-MCNC: 118 MG/DL — HIGH (ref 70–99)
GLUCOSE BLDC GLUCOMTR-MCNC: 120 MG/DL — HIGH (ref 70–99)
GLUCOSE BLDC GLUCOMTR-MCNC: 161 MG/DL — HIGH (ref 70–99)
GLUCOSE SERPL-MCNC: 175 MG/DL — HIGH (ref 70–99)
HCT VFR BLD CALC: 42.7 % — SIGNIFICANT CHANGE UP (ref 37–47)
HGB BLD-MCNC: 13.9 G/DL — SIGNIFICANT CHANGE UP (ref 12–16)
MAGNESIUM SERPL-MCNC: 1.8 MG/DL — SIGNIFICANT CHANGE UP (ref 1.8–2.4)
MCHC RBC-ENTMCNC: 27.9 PG — SIGNIFICANT CHANGE UP (ref 27–31)
MCHC RBC-ENTMCNC: 32.6 G/DL — SIGNIFICANT CHANGE UP (ref 32–37)
MCV RBC AUTO: 85.6 FL — SIGNIFICANT CHANGE UP (ref 81–99)
NRBC # BLD: 0 /100 WBCS — SIGNIFICANT CHANGE UP (ref 0–0)
PLATELET # BLD AUTO: 337 K/UL — SIGNIFICANT CHANGE UP (ref 130–400)
POTASSIUM SERPL-MCNC: 4 MMOL/L — SIGNIFICANT CHANGE UP (ref 3.5–5)
POTASSIUM SERPL-SCNC: 4 MMOL/L — SIGNIFICANT CHANGE UP (ref 3.5–5)
RBC # BLD: 4.99 M/UL — SIGNIFICANT CHANGE UP (ref 4.2–5.4)
RBC # FLD: 13 % — SIGNIFICANT CHANGE UP (ref 11.5–14.5)
SODIUM SERPL-SCNC: 136 MMOL/L — SIGNIFICANT CHANGE UP (ref 135–146)
WBC # BLD: 17.87 K/UL — HIGH (ref 4.8–10.8)
WBC # FLD AUTO: 17.87 K/UL — HIGH (ref 4.8–10.8)

## 2020-01-27 PROCEDURE — 99233 SBSQ HOSP IP/OBS HIGH 50: CPT

## 2020-01-27 PROCEDURE — 88304 TISSUE EXAM BY PATHOLOGIST: CPT | Mod: 26

## 2020-01-27 RX ORDER — OXYCODONE AND ACETAMINOPHEN 5; 325 MG/1; MG/1
2 TABLET ORAL EVERY 6 HOURS
Refills: 0 | Status: DISCONTINUED | OUTPATIENT
Start: 2020-01-27 | End: 2020-02-03

## 2020-01-27 RX ORDER — SALICYLIC ACID 0.5 %
1 CLEANSER (GRAM) TOPICAL
Refills: 0 | Status: DISCONTINUED | OUTPATIENT
Start: 2020-01-27 | End: 2020-01-27

## 2020-01-27 RX ORDER — SALICYLIC ACID 0.5 %
1 CLEANSER (GRAM) TOPICAL
Refills: 0 | Status: DISCONTINUED | OUTPATIENT
Start: 2020-01-27 | End: 2020-02-05

## 2020-01-27 RX ORDER — ACETAMINOPHEN 500 MG
650 TABLET ORAL ONCE
Refills: 0 | Status: DISCONTINUED | OUTPATIENT
Start: 2020-01-27 | End: 2020-01-27

## 2020-01-27 RX ORDER — VANCOMYCIN HCL 1 G
125 VIAL (EA) INTRAVENOUS EVERY 6 HOURS
Refills: 0 | Status: DISCONTINUED | OUTPATIENT
Start: 2020-01-27 | End: 2020-02-05

## 2020-01-27 RX ORDER — NAFCILLIN 10 G/100ML
2 INJECTION, POWDER, FOR SOLUTION INTRAVENOUS EVERY 4 HOURS
Refills: 0 | Status: DISCONTINUED | OUTPATIENT
Start: 2020-01-27 | End: 2020-02-05

## 2020-01-27 RX ORDER — METHYLPREDNISOLONE 4 MG
500 TABLET ORAL EVERY 8 HOURS
Refills: 0 | Status: DISCONTINUED | OUTPATIENT
Start: 2020-01-27 | End: 2020-02-05

## 2020-01-27 RX ORDER — PETROLATUM,WHITE
1 JELLY (GRAM) TOPICAL
Refills: 0 | Status: DISCONTINUED | OUTPATIENT
Start: 2020-01-27 | End: 2020-01-27

## 2020-01-27 RX ORDER — MORPHINE SULFATE 50 MG/1
2 CAPSULE, EXTENDED RELEASE ORAL EVERY 4 HOURS
Refills: 0 | Status: DISCONTINUED | OUTPATIENT
Start: 2020-01-27 | End: 2020-01-27

## 2020-01-27 RX ORDER — NIFEDIPINE 30 MG
60 TABLET, EXTENDED RELEASE 24 HR ORAL DAILY
Refills: 0 | Status: DISCONTINUED | OUTPATIENT
Start: 2020-01-27 | End: 2020-02-05

## 2020-01-27 RX ORDER — LABETALOL HCL 100 MG
100 TABLET ORAL
Refills: 0 | Status: DISCONTINUED | OUTPATIENT
Start: 2020-01-27 | End: 2020-02-05

## 2020-01-27 RX ORDER — OXYCODONE AND ACETAMINOPHEN 5; 325 MG/1; MG/1
1 TABLET ORAL ONCE
Refills: 0 | Status: DISCONTINUED | OUTPATIENT
Start: 2020-01-27 | End: 2020-01-27

## 2020-01-27 RX ORDER — SODIUM CHLORIDE 9 MG/ML
1000 INJECTION, SOLUTION INTRAVENOUS
Refills: 0 | Status: DISCONTINUED | OUTPATIENT
Start: 2020-01-27 | End: 2020-01-27

## 2020-01-27 RX ORDER — ONDANSETRON 8 MG/1
8 TABLET, FILM COATED ORAL EVERY 8 HOURS
Refills: 0 | Status: DISCONTINUED | OUTPATIENT
Start: 2020-01-27 | End: 2020-02-05

## 2020-01-27 RX ORDER — HYDROMORPHONE HYDROCHLORIDE 2 MG/ML
0.5 INJECTION INTRAMUSCULAR; INTRAVENOUS; SUBCUTANEOUS
Refills: 0 | Status: DISCONTINUED | OUTPATIENT
Start: 2020-01-27 | End: 2020-01-27

## 2020-01-27 RX ORDER — NAFCILLIN 10 G/100ML
2 INJECTION, POWDER, FOR SOLUTION INTRAVENOUS EVERY 4 HOURS
Refills: 0 | Status: DISCONTINUED | OUTPATIENT
Start: 2020-01-27 | End: 2020-01-27

## 2020-01-27 RX ORDER — ACETAMINOPHEN 500 MG
650 TABLET ORAL EVERY 6 HOURS
Refills: 0 | Status: DISCONTINUED | OUTPATIENT
Start: 2020-01-27 | End: 2020-02-05

## 2020-01-27 RX ORDER — FAMOTIDINE 10 MG/ML
20 INJECTION INTRAVENOUS
Refills: 0 | Status: DISCONTINUED | OUTPATIENT
Start: 2020-01-27 | End: 2020-02-05

## 2020-01-27 RX ADMIN — NAFCILLIN 200 GRAM(S): 10 INJECTION, POWDER, FOR SOLUTION INTRAVENOUS at 11:41

## 2020-01-27 RX ADMIN — FAMOTIDINE 20 MILLIGRAM(S): 10 INJECTION INTRAVENOUS at 05:30

## 2020-01-27 RX ADMIN — SODIUM CHLORIDE 100 MILLILITER(S): 9 INJECTION, SOLUTION INTRAVENOUS at 15:40

## 2020-01-27 RX ADMIN — Medication 300 MILLIGRAM(S): at 05:31

## 2020-01-27 RX ADMIN — OXYCODONE AND ACETAMINOPHEN 2 TABLET(S): 5; 325 TABLET ORAL at 18:23

## 2020-01-27 RX ADMIN — Medication 500 MILLIGRAM(S): at 05:30

## 2020-01-27 RX ADMIN — Medication 125 MILLIGRAM(S): at 18:24

## 2020-01-27 RX ADMIN — Medication 100 MILLIGRAM(S): at 18:22

## 2020-01-27 RX ADMIN — Medication 100 MILLIGRAM(S): at 05:30

## 2020-01-27 RX ADMIN — NAFCILLIN 200 GRAM(S): 10 INJECTION, POWDER, FOR SOLUTION INTRAVENOUS at 18:24

## 2020-01-27 RX ADMIN — FAMOTIDINE 20 MILLIGRAM(S): 10 INJECTION INTRAVENOUS at 21:21

## 2020-01-27 RX ADMIN — Medication 125 MILLIGRAM(S): at 13:02

## 2020-01-27 RX ADMIN — Medication 1 APPLICATION(S): at 18:22

## 2020-01-27 RX ADMIN — Medication 60 MILLIGRAM(S): at 05:30

## 2020-01-27 RX ADMIN — OXYCODONE AND ACETAMINOPHEN 1 TABLET(S): 5; 325 TABLET ORAL at 17:52

## 2020-01-27 RX ADMIN — NAFCILLIN 200 GRAM(S): 10 INJECTION, POWDER, FOR SOLUTION INTRAVENOUS at 21:07

## 2020-01-27 RX ADMIN — Medication 125 MILLIGRAM(S): at 05:31

## 2020-01-27 NOTE — PROGRESS NOTE ADULT - ASSESSMENT
70yo female, currently a resident at a NH and PMH includes pulmonary embolism (on DOAC), HTN, DM presents to the ER for "wound check" as NH reports the wound has cellulitis extending to the right mid and lower     abdominal wall along with drainage. Patient first had hernia repair to area in 2011 and subsequently revised in 2013. Son told ER wound has not healed correctly since 2013 procedure (Patient mentions that she has had     4 surgeries to that area). Patient is on vancomycin and meropenem at her facility.     ASSESSMENT:  Principal Diagnosis:  cellulitis and abscess of right flank wound  Active C diff colitis  Suspected GI bleeding  Hypokalemia & Hypomagnesemia     Associated Active Comorbid Conditions:  Morbid obesity. BMI 40  Diabetes mellitis type 2   Hypertension   chronic aifb   history of pulmonary embolism     PLAN:      - NPO for OR today for I&D abdominal wall abscess  - change  IV vanco to Nafcillin, ID f/u    - Continue with PO vanco for C diff colitis x 14 days  - Wound cx MSSA  - low suspicion for active gastrointestinal bleeding - hemoglobin/hematocrit very stable --> anticoagulation resumed   - Lovenox on hold for OR today...if OK with surgery would resume Xarelto with dinner tonight   - Hypokalemia - being supplemented  -Hypertension -  procardia, Labetalol 100 milligrams twice daily   -Insulin sliding scale with coverage with meals and QHS   - Morbid obesity-weight & dietary management   -GI Prophylaxis: Pepcid twice daily  -DVT Prophylaxis: Lovenox    Discharge Disposition: NH when stable 68yo female, currently a resident at a NH and PMH includes pulmonary embolism (on DOAC), HTN, DM presents to the ER for "wound check" as NH reports the wound has cellulitis extending to the right mid and lower     abdominal wall along with drainage. Patient first had hernia repair to area in 2011 and subsequently revised in 2013. Son told ER wound has not healed correctly since 2013 procedure (Patient mentions that she has had     4 surgeries to that area). Patient is on vancomycin and meropenem at her facility.     ASSESSMENT:  Principal Diagnosis:  cellulitis and abscess of right flank wound  Active C diff colitis  Suspected GI bleeding  Hypokalemia & Hypomagnesemia     Associated Active Comorbid Conditions:  Morbid obesity. BMI 40  Diabetes mellitis type 2   Hypertension   chronic aifb   history of pulmonary embolism     PLAN:      - NPO for OR today for I&D abdominal wall abscess  - change  IV vanco to Nafcillin, ID f/u    - Continue with PO vanco for C diff colitis x 14 days  - Wound cx MSSA  - low suspicion for active gastrointestinal bleeding - hemoglobin/hematocrit very stable --> anticoagulation resumed   - Lovenox on hold for OR today...if OK with surgery would resume Xarelto with dinner tonight   - Hypokalemia - being supplemented  -Hypertension -  procardia, Labetalol 100 milligrams twice daily   -Insulin sliding scale with coverage with meals and QHS   - Morbid obesity-weight & dietary management   - Vaseline to lips  -GI Prophylaxis: Pepcid twice daily  -DVT Prophylaxis: Lovenox    Discharge Disposition: NH when stable

## 2020-01-27 NOTE — CHART NOTE - NSCHARTNOTEFT_GEN_A_CORE
PACU ANESTHESIA ADMISSION NOTE      Procedure:   Post op diagnosis:      ____  Intubated  TV:______       Rate: ______      FiO2: ______    ___x_  Patent Airway    __x__  Full return of protective reflexes    __x__  Full recovery from anesthesia / back to baseline status    Vitals:  T(C): 36.4 (01-27-20 @ 16:35), Max: 36.5 (01-26-20 @ 19:29)  HR: 77 (01-27-20 @ 16:35) (77 - 92)  BP: 141/75 (01-27-20 @ 16:35) (141/75 - 180/100)  RR: 16 (01-27-20 @ 16:35) (16 - 16)  SpO2: --    Mental Status:  __x_ Awake   x_____ Alert   _____ Drowsy   _____ Sedated    Nausea/Vomiting:  ____ NO  __x____Yes,   See Post - Op Orders          Pain Scale (0-10):  _____    Treatment: ____ None    ___x_ See Post - Op/PCA Orders    Post - Operative Fluids:   ____ Oral   ___x_ See Post - Op Orders    Plan: Discharge:   ____Home       __x___Floor     _____Critical Care    _____  Other:_________________    Comments: uneventful anesthesia course no complications. VItals stable. Pt transferred to PACU

## 2020-01-27 NOTE — PROGRESS NOTE ADULT - SUBJECTIVE AND OBJECTIVE BOX
Patient is c/o nausea and dry lips       T(F): 97.4 (01-27-20 @ 05:26), Max: 97.7 (01-26-20 @ 19:29)  HR: 92 (01-27-20 @ 05:26)  BP: 145/86 (01-27-20 @ 05:26)  RR: 16 (01-27-20 @ 05:26)      PHYSICAL EXAM:  GENERAL: NAD  HEAD:  Atraumatic, Normocephalic  NERVOUS SYSTEM:  Alert & Oriented X3, no focal deficits  CHEST/LUNG: Clear to percussion bilaterally; No rales, rhonchi, wheezing, or rubs  HEART: Regular rate and rhythm; No murmurs, rubs, or gallops  ABDOMEN: Soft, Nontender, Nondistended; Bowel sounds present  EXTREMITIES:  2+ Peripheral Pulses, No clubbing, cyanosis, or edema  LYMPH: No lymphadenopathy noted  SKIN: No rashes or lesions    LABS  01-26    139  |  98  |  9<L>  ----------------------------<  179<H>  3.5   |  26  |  0.5<L>    Ca    9.9      26 Jan 2020 07:41  Mg     1.6     01-26                            13.9   17.87 )-----------( 337      ( 27 Jan 2020 07:52 )             42.7         Culture Results:   No growth to date. (01-23-20)  Culture Results:   No growth to date. (01-23-20)  Culture Results:   Few Staphylococcus aureus (01-23-20)    RADIOLOGY  < from: Xray Chest 1 View-PORTABLE IMMEDIATE (01.25.20 @ 14:35) >    Impression:      No radiographic evidence of acute cardiopulmonary disease.      < end of copied text >  < from: CT Abdomen and Pelvis w/ IV Cont (01.23.20 @ 01:47) >    IMPRESSION:     Persistent right flank inflammatory change with oblong subcutaneous abscess measuring up to 6.3 cm craniocaudal in the right flank subcutaneous fat.    < end of copied text >    MEDICATIONS  (STANDING):  famotidine Injectable 20 milliGRAM(s) IV Push two times a day  labetalol 100 milliGRAM(s) Oral two times a day  lactated ringers 1000 milliLiter(s) (100 mL/Hr) IV Continuous <Continuous>  magnesium gluconate 500 milliGRAM(s) Oral every 8 hours  nafcillin  IVPB 2 Gram(s) IV Intermittent every 4 hours  NIFEdipine XL 60 milliGRAM(s) Oral daily  vancomycin    Solution 125 milliGRAM(s) Oral every 6 hours    MEDICATIONS  (PRN):  acetaminophen   Tablet .. 650 milliGRAM(s) Oral every 6 hours PRN Mild Pain (1 - 3)  aluminum hydroxide/magnesium hydroxide/simethicone Suspension 30 milliLiter(s) Oral every 4 hours PRN Dyspepsia  morphine  - Injectable 2 milliGRAM(s) IV Push every 4 hours PRN Mild Pain (1 - 3)  ondansetron Injectable 8 milliGRAM(s) IV Push every 8 hours PRN Nausea and/or Vomiting  oxycodone    5 mG/acetaminophen 325 mG 2 Tablet(s) Oral every 6 hours PRN Moderate Pain (4 - 6)

## 2020-01-27 NOTE — CHART NOTE - NSCHARTNOTEFT_GEN_A_CORE
-spoke with surgery team recommended to restart Xarelto tomorrow on 1/28/20 and not to give a dose tonight

## 2020-01-27 NOTE — CHART NOTE - NSCHARTNOTEFT_GEN_A_CORE
post op  note     Pt seen and examined at bed side . w/o complaint.  Abdominal wall abscess 27-Jan-2020 17:31:34  Tomasz Mukherjee.  ·  POST-OP DIAGNOSIS:  Abdominal wall abscess 27-Jan-2020 17:31:56  Tomasz Mukherjee.  ·  PROCEDURES:  Debridement, abdominal wall 27-Jan-2020 17:31:01  Tomasz Mukherjee.    Pt tolerated procedure well

## 2020-01-27 NOTE — PROGRESS NOTE ADULT - SUBJECTIVE AND OBJECTIVE BOX
.  Preoperative note:      Preoperative diagnosis:   Abdominal wall abscess / s/p Bedside I&D on 1/23/20.    Planned Procedure:   Exploration of abdominal wall abscess with I&D of abscess.    Surgeon:  Dr. Banda.     Labs:                            13.0   14.66 )-----------( 331      ( 26 Jan 2020 07:41 )             40.5       01-26    139  |  98  |  9<L>  ----------------------------<  179<H>  3.5   |  26  |  0.5<L>    Ca    9.9      26 Jan 2020 07:41  Mg     1.6     01-26            Lactate Trend  01-23 @ 00:10 Lactate:1.7       POCT Blood Glucose.: 157 mg/dL (26 Jan 2020 07:09)        Culture Results:   No growth to date. (01-23 @ 00:10)  Culture Results:   No growth to date. (01-23 @ 00:10)  Culture Results:   Few Staphylococcus aureus (01-23 @ 00:10)        CXR:    Xray Chest 1 View-PORTABLE IMMEDIATE (01.25.20 @ 14:35)   EXAM:  XR CHEST PORTABLE IMMED 1V            PROCEDURE DATE:  01/25/2020        INTERPRETATION:  Clinical History / Reason for exam: Preoperative evaluation    Comparison : Chest radiograph 12/7/2019.    Technique/Positioning: Single portable AP radiograph the chest.    Findings:    Support devices: None.    Cardiac/mediastinum/hilum: Unchanged    Lung parenchyma/Pleura: No focal consolidation, pleural effusion or pneumothorax    Skeleton/soft tissues: Degenerative changes of the spine    Impression:      No radiographic evidence of acute cardiopulmonary disease.            EKG:    Results pending for EKG completed on 1/25/20.      12 Lead ECG (12.18.19 @ 15:40)   Ventricular Rate 55 BPM    Atrial Rate 277 BPM    QRS Duration 114 ms    Q-T Interval 426 ms    QTC Calculation(Bezet) 407 ms    R Axis -14 degrees    T Axis 26 degrees    Diagnosis Line Atrial fibrillation with slow ventricular response  Poor R wave progression    Confirmed by REINA LING MD ([id number]) on 12/19/2019 12:07:15 PM

## 2020-01-28 LAB
ANION GAP SERPL CALC-SCNC: 12 MMOL/L — SIGNIFICANT CHANGE UP (ref 7–14)
BUN SERPL-MCNC: 13 MG/DL — SIGNIFICANT CHANGE UP (ref 10–20)
CALCIUM SERPL-MCNC: 10 MG/DL — SIGNIFICANT CHANGE UP (ref 8.5–10.1)
CHLORIDE SERPL-SCNC: 102 MMOL/L — SIGNIFICANT CHANGE UP (ref 98–110)
CO2 SERPL-SCNC: 26 MMOL/L — SIGNIFICANT CHANGE UP (ref 17–32)
CREAT SERPL-MCNC: 0.5 MG/DL — LOW (ref 0.7–1.5)
CULTURE RESULTS: SIGNIFICANT CHANGE UP
GLUCOSE BLDC GLUCOMTR-MCNC: 102 MG/DL — HIGH (ref 70–99)
GLUCOSE BLDC GLUCOMTR-MCNC: 103 MG/DL — HIGH (ref 70–99)
GLUCOSE BLDC GLUCOMTR-MCNC: 90 MG/DL — SIGNIFICANT CHANGE UP (ref 70–99)
GLUCOSE BLDC GLUCOMTR-MCNC: 97 MG/DL — SIGNIFICANT CHANGE UP (ref 70–99)
GLUCOSE SERPL-MCNC: 93 MG/DL — SIGNIFICANT CHANGE UP (ref 70–99)
HCT VFR BLD CALC: 39.8 % — SIGNIFICANT CHANGE UP (ref 37–47)
HGB BLD-MCNC: 12.7 G/DL — SIGNIFICANT CHANGE UP (ref 12–16)
MAGNESIUM SERPL-MCNC: 2 MG/DL — SIGNIFICANT CHANGE UP (ref 1.8–2.4)
MCHC RBC-ENTMCNC: 27.5 PG — SIGNIFICANT CHANGE UP (ref 27–31)
MCHC RBC-ENTMCNC: 31.9 G/DL — LOW (ref 32–37)
MCV RBC AUTO: 86.3 FL — SIGNIFICANT CHANGE UP (ref 81–99)
NRBC # BLD: 0 /100 WBCS — SIGNIFICANT CHANGE UP (ref 0–0)
ORGANISM # SPEC MICROSCOPIC CNT: SIGNIFICANT CHANGE UP
ORGANISM # SPEC MICROSCOPIC CNT: SIGNIFICANT CHANGE UP
PLATELET # BLD AUTO: 323 K/UL — SIGNIFICANT CHANGE UP (ref 130–400)
POTASSIUM SERPL-MCNC: 3.8 MMOL/L — SIGNIFICANT CHANGE UP (ref 3.5–5)
POTASSIUM SERPL-SCNC: 3.8 MMOL/L — SIGNIFICANT CHANGE UP (ref 3.5–5)
RBC # BLD: 4.61 M/UL — SIGNIFICANT CHANGE UP (ref 4.2–5.4)
RBC # FLD: 13.3 % — SIGNIFICANT CHANGE UP (ref 11.5–14.5)
SODIUM SERPL-SCNC: 140 MMOL/L — SIGNIFICANT CHANGE UP (ref 135–146)
SPECIMEN SOURCE: SIGNIFICANT CHANGE UP
WBC # BLD: 14.77 K/UL — HIGH (ref 4.8–10.8)
WBC # FLD AUTO: 14.77 K/UL — HIGH (ref 4.8–10.8)

## 2020-01-28 PROCEDURE — 99233 SBSQ HOSP IP/OBS HIGH 50: CPT

## 2020-01-28 RX ORDER — RIVAROXABAN 15 MG-20MG
20 KIT ORAL
Refills: 0 | Status: DISCONTINUED | OUTPATIENT
Start: 2020-01-28 | End: 2020-02-05

## 2020-01-28 RX ADMIN — OXYCODONE AND ACETAMINOPHEN 2 TABLET(S): 5; 325 TABLET ORAL at 20:38

## 2020-01-28 RX ADMIN — NAFCILLIN 200 GRAM(S): 10 INJECTION, POWDER, FOR SOLUTION INTRAVENOUS at 21:08

## 2020-01-28 RX ADMIN — Medication 500 MILLIGRAM(S): at 13:03

## 2020-01-28 RX ADMIN — RIVAROXABAN 20 MILLIGRAM(S): KIT at 17:14

## 2020-01-28 RX ADMIN — OXYCODONE AND ACETAMINOPHEN 2 TABLET(S): 5; 325 TABLET ORAL at 04:36

## 2020-01-28 RX ADMIN — NAFCILLIN 200 GRAM(S): 10 INJECTION, POWDER, FOR SOLUTION INTRAVENOUS at 09:45

## 2020-01-28 RX ADMIN — Medication 125 MILLIGRAM(S): at 02:50

## 2020-01-28 RX ADMIN — Medication 125 MILLIGRAM(S): at 11:06

## 2020-01-28 RX ADMIN — Medication 500 MILLIGRAM(S): at 21:07

## 2020-01-28 RX ADMIN — OXYCODONE AND ACETAMINOPHEN 2 TABLET(S): 5; 325 TABLET ORAL at 03:47

## 2020-01-28 RX ADMIN — FAMOTIDINE 20 MILLIGRAM(S): 10 INJECTION INTRAVENOUS at 05:23

## 2020-01-28 RX ADMIN — Medication 500 MILLIGRAM(S): at 05:23

## 2020-01-28 RX ADMIN — Medication 100 MILLIGRAM(S): at 05:23

## 2020-01-28 RX ADMIN — NAFCILLIN 200 GRAM(S): 10 INJECTION, POWDER, FOR SOLUTION INTRAVENOUS at 13:03

## 2020-01-28 RX ADMIN — NAFCILLIN 200 GRAM(S): 10 INJECTION, POWDER, FOR SOLUTION INTRAVENOUS at 02:50

## 2020-01-28 RX ADMIN — Medication 125 MILLIGRAM(S): at 17:15

## 2020-01-28 RX ADMIN — Medication 125 MILLIGRAM(S): at 05:25

## 2020-01-28 RX ADMIN — Medication 1 APPLICATION(S): at 17:14

## 2020-01-28 RX ADMIN — Medication 1 APPLICATION(S): at 05:23

## 2020-01-28 RX ADMIN — Medication 60 MILLIGRAM(S): at 05:23

## 2020-01-28 RX ADMIN — Medication 100 MILLIGRAM(S): at 17:14

## 2020-01-28 RX ADMIN — FAMOTIDINE 20 MILLIGRAM(S): 10 INJECTION INTRAVENOUS at 17:15

## 2020-01-28 RX ADMIN — NAFCILLIN 200 GRAM(S): 10 INJECTION, POWDER, FOR SOLUTION INTRAVENOUS at 17:15

## 2020-01-28 NOTE — PROGRESS NOTE ADULT - ASSESSMENT
70yo female, currently a resident at a NH and PMH includes pulmonary embolism (on DOAC), Afib,  HTN, DM presents to the ER for "wound check" as NH reports the wound has cellulitis extending to the right mid and lower     abdominal wall along with drainage. Patient first had hernia repair to area in 2011 and subsequently revised in 2013. Son told ER wound has not healed correctly since 2013 procedure (Patient mentions that she has had     4 surgeries to that area). Patient is on vancomycin and meropenem at her facility.     ASSESSMENT:  Principal Diagnosis:  cellulitis and abscess of right flank wound  Active C diff colitis  Hypokalemia & Hypomagnesemia     Associated Active Comorbid Conditions:  Morbid obesity. BMI 40  Diabetes mellitis type 2   Hypertension   chronic aifb   history of pulmonary embolism     PLAN:        - continue  Nafcillin and oral Vanco  - local wound care s/p I&D as per surgery  - Wound cx MSSA  - advance diet today   - Hypokalemia - improved s/p supplementation   -Hypertension -  procardia, Labetalol 100 milligrams twice daily   -Insulin sliding scale with coverage with meals and QHS   - Morbid obesity-weight & dietary management   - Vaseline to lips  -GI Prophylaxis: Pepcid twice daily  -DVT Prophylaxis:  Xarelto    Discharge Disposition: NH when stable

## 2020-01-28 NOTE — PROGRESS NOTE ADULT - SUBJECTIVE AND OBJECTIVE BOX
PATRICIA SPRAGUE  69y, Female  Allergy: No Known Allergies      CHIEF COMPLAINT: abdominal wall abscess (24 Jan 2020 11:39)      INTERVAL EVENTS/HPI  - No acute events overnight  - T(F): , Max: 98.6 (01-27-20 @ 22:06)  - Denies any worsening symptoms  - Tolerating medication    ROS  10 system review - neg     SOCIAL HISTORY - not relevant     Substance Use (  ) never used  (  ) IVDU (  ) Other:  Tobacco Usage:  (   ) never smoked   (   ) former smoker   (   ) current smoker   Alcohol Usage: (   ) social  (   ) daily use (   ) denies  Sexual History:       FH noncontributory     VITALS:  T(F): 98.2, Max: 98.6 (01-27-20 @ 22:06)  HR: 73  BP: 145/68  RR: 16Vital Signs Last 24 Hrs  T(C): 36.8 (28 Jan 2020 06:10), Max: 37 (27 Jan 2020 22:06)  T(F): 98.2 (28 Jan 2020 06:10), Max: 98.6 (27 Jan 2020 22:06)  HR: 73 (28 Jan 2020 06:10) (73 - 102)  BP: 145/68 (28 Jan 2020 06:10) (127/68 - 145/86)  BP(mean): --  RR: 16 (28 Jan 2020 06:10) (16 - 25)  SpO2: 96% (27 Jan 2020 17:55) (92% - 98%)    PHYSICAL EXAM:  Gen: NAD, resting in bed  HEENT: Normocephalic, atraumatic  Neck: supple, no lymphadenopathy  CV: s1 s 2 +   Lungs: clear   Abdomen: Soft, BS present. dressed wound - R side   Ext: Warm, well perfused  Neuro: non focal, awake  Skin: no rash, no erythema      TESTS & MEASUREMENTS:                        13.9   17.87 )-----------( 337      ( 27 Jan 2020 07:52 )             42.7     01-27    136  |  97<L>  |  11  ----------------------------<  175<H>  4.0   |  28  |  0.5<L>    Ca    10.2<H>      27 Jan 2020 07:52  Mg     1.8     01-27              Culture - Abscess with Gram Stain (collected 01-23-20 @ 00:10)  Source: .Abscess right abdominal wall abscess  Final Report (01-28-20 @ 08:25):    Few Staphylococcus aureus  Organism: Staphylococcus aureus (01-28-20 @ 08:25)  Organism: Staphylococcus aureus (01-28-20 @ 08:25)      -  Ampicillin/Sulbactam: S <=8/4      -  Cefazolin: S <=4      -  Clindamycin: S <=0.25      -  Erythromycin: S <=0.25      -  Gentamicin: S <=1 Should not be used as monotherapy      -  Oxacillin: S <=0.25      -  RIF- Rifampin: S <=1 Should not be used as monotherapy      -  Tetra/Doxy: S <=1      -  Trimethoprim/Sulfamethoxazole: S <=0.5/9.5      -  Vancomycin: S 2      Method Type: NAGI    Culture - Blood (collected 01-23-20 @ 00:10)  Source: .Blood Blood  Preliminary Report (01-24-20 @ 17:02):    No growth to date.    Culture - Blood (collected 01-23-20 @ 00:10)  Source: .Blood Blood  Preliminary Report (01-24-20 @ 17:02):    No growth to date.            INFECTIOUS DISEASES TESTING  Hepatitis C Virus Interpretation: Nonreact (09-27-19 @ 07:37)      RADIOLOGY & ADDITIONAL TESTS:      CARDIOLOGY TESTING  12 Lead ECG:   Ventricular Rate 83 BPM    Atrial Rate 277 BPM    QRS Duration 102 ms    Q-T Interval 390 ms    QTC Calculation(Bezet) 458 ms    R Axis -36 degrees    T Axis 4 degrees    Diagnosis Line Atrial fibrillation  Left axis deviation  Low voltage QRS  Poor R wave progression  Confirmed by REINA LING MD [id number]) on 1/27/2020 2:03:25 PM (01-27-20 @ 08:40)  12 Lead ECG:   Ventricular Rate 87 BPM    Atrial Rate 441 BPM    QRS Duration 98 ms    Q-T Interval 384 ms    QTC Calculation(Bezet) 462 ms    R Axis -37 degrees    T Axis 0 degrees    Diagnosis Line Atrial fibrillation  Left axis deviation  Low voltage QRS  PoorR wave progression    Confirmed by REINA LING MD [id number]) on 1/27/2020 2:03:01 PM (01-27-20 @ 08:40)      MEDICATIONS  acetaminophen   Tablet .. 650  famotidine Injectable 20  labetalol 100  magnesium gluconate 500  nafcillin  IVPB 2  NIFEdipine XL 60  vancomycin    Solution 125  vitamin A &amp; D Ointment 1      ANTIBIOTICS:  nafcillin  IVPB 2 Gram(s) IV Intermittent every 4 hours  vancomycin    Solution 125 milliGRAM(s) Oral every 6 hours

## 2020-01-28 NOTE — PROGRESS NOTE ADULT - ASSESSMENT
68 yo female POD 1 s/p I&D abdominal wall abscess.         Plan:  - cont IV antibx  - FUP CBC  - DSG change 1/29  - may restart xarelto from surgical standpoint        All above d/w DR Banda

## 2020-01-28 NOTE — PROGRESS NOTE ADULT - SUBJECTIVE AND OBJECTIVE BOX
Patient feels good asking for advanced diet      T(F): 98.2 (01-28-20 @ 06:10), Max: 98.6 (01-27-20 @ 22:06)  HR: 73 (01-28-20 @ 06:10)  BP: 145/68 (01-28-20 @ 06:10)  RR: 16 (01-28-20 @ 06:10)  SpO2: 96% (01-27-20 @ 17:55) (92% - 98%)    PHYSICAL EXAM:  GENERAL: NAD  NERVOUS SYSTEM:  Alert & Oriented X3, no focal deficits   CHEST/LUNG: Clear to percussion bilaterally; No rales, rhonchi, wheezing, or rubs  HEART: Regular rate and rhythm; No murmurs, rubs, or gallops  ABDOMEN: Soft, Nontender, Nondistended; Bowel sounds present  EXTREMITIES:  2+ Peripheral Pulses, No clubbing, cyanosis, or edema  LYMPH: No lymphadenopathy noted  SKIN: No rashes or lesions    LABS  01-28    140  |  102  |  13  ----------------------------<  93  3.8   |  26  |  0.5<L>    Ca    10.0      28 Jan 2020 07:47  Mg     2.0     01-28                            12.7   14.77 )-----------( 323      ( 28 Jan 2020 07:47 )             39.8     Culture Results:   No growth to date. (01-23-20)  Culture Results:   No growth to date. (01-23-20)  Culture Results:   Few Staphylococcus aureus (01-23-20)      MEDICATIONS  (STANDING):  acetaminophen   Tablet .. 650 milliGRAM(s) Oral every 6 hours  famotidine Injectable 20 milliGRAM(s) IV Push two times a day  labetalol 100 milliGRAM(s) Oral two times a day  magnesium gluconate 500 milliGRAM(s) Oral every 8 hours  nafcillin  IVPB 2 Gram(s) IV Intermittent every 4 hours  NIFEdipine XL 60 milliGRAM(s) Oral daily  rivaroxaban 20 milliGRAM(s) Oral with dinner  vancomycin    Solution 125 milliGRAM(s) Oral every 6 hours  vitamin A &amp; D Ointment 1 Application(s) Topical two times a day    MEDICATIONS  (PRN):  aluminum hydroxide/magnesium hydroxide/simethicone Suspension 30 milliLiter(s) Oral every 4 hours PRN Dyspepsia  morphine  - Injectable 2 milliGRAM(s) IV Push every 4 hours PRN Mild Pain (1 - 3)  ondansetron Injectable 8 milliGRAM(s) IV Push every 8 hours PRN Nausea and/or Vomiting  oxycodone    5 mG/acetaminophen 325 mG 2 Tablet(s) Oral every 6 hours PRN Moderate Pain (4 - 6)

## 2020-01-28 NOTE — PROGRESS NOTE ADULT - PROBLEM SELECTOR PLAN 1
acute infection   clinically better - s/p I & d   dc planning     MSSA on cx   anticipate dc on PO Keflex 750 mg Q 8 - at least 14 days - once cleared for dc by surgery   follow up with Surgery   recall if needed

## 2020-01-28 NOTE — PROGRESS NOTE ADULT - SUBJECTIVE AND OBJECTIVE BOX
STATUS POST:  I&D abdominal wall abscess    POST OPERATIVE DAY #: 1      Subjective: 68 yo female s/p above. Feels well without any complaints. Pt denies much abdominal pain, fever/chills, n/v. Wants to advance diet.            Vital Signs Last 24 Hrs  T(C): 36.8 (28 Jan 2020 06:10), Max: 37 (27 Jan 2020 22:06)  T(F): 98.2 (28 Jan 2020 06:10), Max: 98.6 (27 Jan 2020 22:06)  HR: 73 (28 Jan 2020 06:10) (73 - 102)  BP: 145/68 (28 Jan 2020 06:10) (127/68 - 145/86)  BP(mean): --  RR: 16 (28 Jan 2020 06:10) (16 - 25)  SpO2: 96% (27 Jan 2020 17:55) (92% - 98%)    General Appearance: Appears well, NAD  Neck: Supple  Chest: Equal expansion bilaterally, equal breath sounds  CV: Pulse regular presently  Abdomen: Soft, nontense, appropriate incisional tenderness, dressings clean and dry and intact  Extremities: Grossly symmetric, no calf tend b/l      I&O's Summary    27 Jan 2020 07:01  -  28 Jan 2020 07:00  --------------------------------------------------------  IN: 0 mL / OUT: 1400 mL / NET: -1400 mL      I&O's Detail    27 Jan 2020 07:01  -  28 Jan 2020 07:00  --------------------------------------------------------  IN:  Total IN: 0 mL    OUT:    Voided: 1400 mL  Total OUT: 1400 mL    Total NET: -1400 mL          MEDICATIONS  (STANDING):  acetaminophen   Tablet .. 650 milliGRAM(s) Oral every 6 hours  famotidine Injectable 20 milliGRAM(s) IV Push two times a day  labetalol 100 milliGRAM(s) Oral two times a day  magnesium gluconate 500 milliGRAM(s) Oral every 8 hours  nafcillin  IVPB 2 Gram(s) IV Intermittent every 4 hours  NIFEdipine XL 60 milliGRAM(s) Oral daily  vancomycin    Solution 125 milliGRAM(s) Oral every 6 hours  vitamin A &amp; D Ointment 1 Application(s) Topical two times a day    MEDICATIONS  (PRN):  aluminum hydroxide/magnesium hydroxide/simethicone Suspension 30 milliLiter(s) Oral every 4 hours PRN Dyspepsia  morphine  - Injectable 2 milliGRAM(s) IV Push every 4 hours PRN Mild Pain (1 - 3)  ondansetron Injectable 8 milliGRAM(s) IV Push every 8 hours PRN Nausea and/or Vomiting  oxycodone    5 mG/acetaminophen 325 mG 2 Tablet(s) Oral every 6 hours PRN Moderate Pain (4 - 6)      LABS:                        13.9   17.87 )-----------( 337      ( 27 Jan 2020 07:52 )             42.7     01-27    136  |  97<L>  |  11  ----------------------------<  175<H>  4.0   |  28  |  0.5<L>    Ca    10.2<H>      27 Jan 2020 07:52  Mg     1.8     01-27            RADIOLOGY & ADDITIONAL STUDIES: none new

## 2020-01-29 LAB
ALBUMIN SERPL ELPH-MCNC: 3.1 G/DL — LOW (ref 3.5–5.2)
ALP SERPL-CCNC: 102 U/L — SIGNIFICANT CHANGE UP (ref 30–115)
ALT FLD-CCNC: 24 U/L — SIGNIFICANT CHANGE UP (ref 0–41)
ANION GAP SERPL CALC-SCNC: 12 MMOL/L — SIGNIFICANT CHANGE UP (ref 7–14)
AST SERPL-CCNC: 21 U/L — SIGNIFICANT CHANGE UP (ref 0–41)
BILIRUB SERPL-MCNC: 1 MG/DL — SIGNIFICANT CHANGE UP (ref 0.2–1.2)
BUN SERPL-MCNC: 12 MG/DL — SIGNIFICANT CHANGE UP (ref 10–20)
CALCIUM SERPL-MCNC: 9.7 MG/DL — SIGNIFICANT CHANGE UP (ref 8.5–10.1)
CHLORIDE SERPL-SCNC: 99 MMOL/L — SIGNIFICANT CHANGE UP (ref 98–110)
CO2 SERPL-SCNC: 26 MMOL/L — SIGNIFICANT CHANGE UP (ref 17–32)
CREAT SERPL-MCNC: 0.6 MG/DL — LOW (ref 0.7–1.5)
GLUCOSE BLDC GLUCOMTR-MCNC: 103 MG/DL — HIGH (ref 70–99)
GLUCOSE BLDC GLUCOMTR-MCNC: 106 MG/DL — HIGH (ref 70–99)
GLUCOSE BLDC GLUCOMTR-MCNC: 122 MG/DL — HIGH (ref 70–99)
GLUCOSE BLDC GLUCOMTR-MCNC: 124 MG/DL — HIGH (ref 70–99)
GLUCOSE SERPL-MCNC: 100 MG/DL — HIGH (ref 70–99)
HCT VFR BLD CALC: 38.5 % — SIGNIFICANT CHANGE UP (ref 37–47)
HGB BLD-MCNC: 12.4 G/DL — SIGNIFICANT CHANGE UP (ref 12–16)
MAGNESIUM SERPL-MCNC: 2 MG/DL — SIGNIFICANT CHANGE UP (ref 1.8–2.4)
MCHC RBC-ENTMCNC: 27.9 PG — SIGNIFICANT CHANGE UP (ref 27–31)
MCHC RBC-ENTMCNC: 32.2 G/DL — SIGNIFICANT CHANGE UP (ref 32–37)
MCV RBC AUTO: 86.5 FL — SIGNIFICANT CHANGE UP (ref 81–99)
NRBC # BLD: 0 /100 WBCS — SIGNIFICANT CHANGE UP (ref 0–0)
PLATELET # BLD AUTO: 291 K/UL — SIGNIFICANT CHANGE UP (ref 130–400)
POTASSIUM SERPL-MCNC: 3.7 MMOL/L — SIGNIFICANT CHANGE UP (ref 3.5–5)
POTASSIUM SERPL-SCNC: 3.7 MMOL/L — SIGNIFICANT CHANGE UP (ref 3.5–5)
PROT SERPL-MCNC: 5.5 G/DL — LOW (ref 6–8)
RBC # BLD: 4.45 M/UL — SIGNIFICANT CHANGE UP (ref 4.2–5.4)
RBC # FLD: 13.6 % — SIGNIFICANT CHANGE UP (ref 11.5–14.5)
SODIUM SERPL-SCNC: 137 MMOL/L — SIGNIFICANT CHANGE UP (ref 135–146)
WBC # BLD: 14.99 K/UL — HIGH (ref 4.8–10.8)
WBC # FLD AUTO: 14.99 K/UL — HIGH (ref 4.8–10.8)

## 2020-01-29 PROCEDURE — 99233 SBSQ HOSP IP/OBS HIGH 50: CPT

## 2020-01-29 RX ORDER — POTASSIUM CHLORIDE 20 MEQ
40 PACKET (EA) ORAL ONCE
Refills: 0 | Status: COMPLETED | OUTPATIENT
Start: 2020-01-29 | End: 2020-01-29

## 2020-01-29 RX ADMIN — FAMOTIDINE 20 MILLIGRAM(S): 10 INJECTION INTRAVENOUS at 06:20

## 2020-01-29 RX ADMIN — Medication 125 MILLIGRAM(S): at 18:39

## 2020-01-29 RX ADMIN — OXYCODONE AND ACETAMINOPHEN 2 TABLET(S): 5; 325 TABLET ORAL at 16:30

## 2020-01-29 RX ADMIN — Medication 60 MILLIGRAM(S): at 05:39

## 2020-01-29 RX ADMIN — Medication 125 MILLIGRAM(S): at 05:39

## 2020-01-29 RX ADMIN — Medication 125 MILLIGRAM(S): at 12:21

## 2020-01-29 RX ADMIN — OXYCODONE AND ACETAMINOPHEN 2 TABLET(S): 5; 325 TABLET ORAL at 16:08

## 2020-01-29 RX ADMIN — OXYCODONE AND ACETAMINOPHEN 2 TABLET(S): 5; 325 TABLET ORAL at 06:21

## 2020-01-29 RX ADMIN — Medication 125 MILLIGRAM(S): at 00:00

## 2020-01-29 RX ADMIN — Medication 100 MILLIGRAM(S): at 05:39

## 2020-01-29 RX ADMIN — RIVAROXABAN 20 MILLIGRAM(S): KIT at 18:33

## 2020-01-29 RX ADMIN — NAFCILLIN 200 GRAM(S): 10 INJECTION, POWDER, FOR SOLUTION INTRAVENOUS at 03:21

## 2020-01-29 RX ADMIN — NAFCILLIN 200 GRAM(S): 10 INJECTION, POWDER, FOR SOLUTION INTRAVENOUS at 10:44

## 2020-01-29 RX ADMIN — FAMOTIDINE 20 MILLIGRAM(S): 10 INJECTION INTRAVENOUS at 21:42

## 2020-01-29 RX ADMIN — Medication 500 MILLIGRAM(S): at 21:45

## 2020-01-29 RX ADMIN — NAFCILLIN 200 GRAM(S): 10 INJECTION, POWDER, FOR SOLUTION INTRAVENOUS at 05:40

## 2020-01-29 RX ADMIN — Medication 40 MILLIEQUIVALENT(S): at 12:29

## 2020-01-29 RX ADMIN — NAFCILLIN 200 GRAM(S): 10 INJECTION, POWDER, FOR SOLUTION INTRAVENOUS at 18:38

## 2020-01-29 RX ADMIN — NAFCILLIN 200 GRAM(S): 10 INJECTION, POWDER, FOR SOLUTION INTRAVENOUS at 14:12

## 2020-01-29 RX ADMIN — NAFCILLIN 200 GRAM(S): 10 INJECTION, POWDER, FOR SOLUTION INTRAVENOUS at 21:45

## 2020-01-29 RX ADMIN — Medication 500 MILLIGRAM(S): at 05:39

## 2020-01-29 RX ADMIN — Medication 100 MILLIGRAM(S): at 18:32

## 2020-01-29 RX ADMIN — OXYCODONE AND ACETAMINOPHEN 2 TABLET(S): 5; 325 TABLET ORAL at 05:39

## 2020-01-29 RX ADMIN — Medication 500 MILLIGRAM(S): at 14:12

## 2020-01-29 RX ADMIN — Medication 1 APPLICATION(S): at 05:39

## 2020-01-29 RX ADMIN — Medication 1 APPLICATION(S): at 18:43

## 2020-01-29 NOTE — PROGRESS NOTE ADULT - SUBJECTIVE AND OBJECTIVE BOX
Patient has no complaints today, tolerating diet      T(F): 97.5 (01-29-20 @ 05:40), Max: 97.7 (01-28-20 @ 14:49)  HR: 64 (01-29-20 @ 05:40)  BP: 139/71 (01-29-20 @ 05:40)  RR: 16 (01-29-20 @ 05:40)      PHYSICAL EXAM:  GENERAL: NAD,  HEAD:  Atraumatic, Normocephalic  NERVOUS SYSTEM:  Alert & Oriented X3, no focal deficits   CHEST/LUNG: Clear to percussion bilaterally; No rales, rhonchi, wheezing, or rubs  HEART: Regular rate and rhythm; No murmurs, rubs, or gallops  ABDOMEN: Soft, Nontender, right flank bandage   EXTREMITIES:  b/l  edema      LABS  01-29    137  |  99  |  12  ----------------------------<  100<H>  3.7   |  26  |  0.6<L>    Ca    9.7      29 Jan 2020 09:13  Mg     2.0     01-29    TPro  5.5<L>  /  Alb  3.1<L>  /  TBili  1.0  /  DBili  x   /  AST  21  /  ALT  24  /  AlkPhos  102  01-29                          12.4   14.99 )-----------( 291      ( 29 Jan 2020 09:13 )             38.5       Culture Results:   No growth (01-27-20)  Culture Results:   No growth (01-27-20)  Culture Results:   No growth at 5 days. (01-23-20)  Culture Results:   No growth at 5 days. (01-23-20)  Culture Results:   Few Staphylococcus aureus (01-23-20)      MEDICATIONS  (STANDING):  acetaminophen   Tablet .. 650 milliGRAM(s) Oral every 6 hours  famotidine Injectable 20 milliGRAM(s) IV Push two times a day  labetalol 100 milliGRAM(s) Oral two times a day  magnesium gluconate 500 milliGRAM(s) Oral every 8 hours  nafcillin  IVPB 2 Gram(s) IV Intermittent every 4 hours  NIFEdipine XL 60 milliGRAM(s) Oral daily  potassium chloride    Tablet ER 40 milliEquivalent(s) Oral once  rivaroxaban 20 milliGRAM(s) Oral with dinner  vancomycin    Solution 125 milliGRAM(s) Oral every 6 hours  vitamin A &amp; D Ointment 1 Application(s) Topical two times a day    MEDICATIONS  (PRN):  aluminum hydroxide/magnesium hydroxide/simethicone Suspension 30 milliLiter(s) Oral every 4 hours PRN Dyspepsia  morphine  - Injectable 2 milliGRAM(s) IV Push every 4 hours PRN Mild Pain (1 - 3)  ondansetron Injectable 8 milliGRAM(s) IV Push every 8 hours PRN Nausea and/or Vomiting  oxycodone    5 mG/acetaminophen 325 mG 2 Tablet(s) Oral every 6 hours PRN Moderate Pain (4 - 6)

## 2020-01-29 NOTE — PROGRESS NOTE ADULT - SUBJECTIVE AND OBJECTIVE BOX
STATUS POST:  I&D abdominal wall abscess    POST OPERATIVE DAY #: 2      Subjective: 70 yo female s/p above. Feels well without any complaints. Pt denies much abdominal pain, fever/chills, n/v.         Vital Signs Last 24 Hrs  T(C): 36.4 (29 Jan 2020 05:40), Max: 36.5 (28 Jan 2020 14:49)  T(F): 97.5 (29 Jan 2020 05:40), Max: 97.7 (28 Jan 2020 14:49)  HR: 64 (29 Jan 2020 05:40) (64 - 65)  BP: 139/71 (29 Jan 2020 05:40) (139/71 - 156/68)  BP(mean): --  RR: 16 (29 Jan 2020 05:40) (16 - 16)  SpO2: --    General Appearance: Appears well, NAD  Neck: Supple  Chest: Equal expansion bilaterally, equal breath sounds  CV: Pulse regular presently  Abdomen: Soft, nontense, right sided incision approx 10cm open with packing and some exudate  dsg changed with wet-dry Kerlix and abds, tolerated well  Extremities: Grossly symmetric, no calf tend b/l      I&O's Summary    27 Jan 2020 07:01  -  28 Jan 2020 07:00  --------------------------------------------------------  IN: 0 mL / OUT: 1400 mL / NET: -1400 mL      I&O's Detail    27 Jan 2020 07:01  -  28 Jan 2020 07:00  --------------------------------------------------------  IN:  Total IN: 0 mL    OUT:    Voided: 1400 mL  Total OUT: 1400 mL    Total NET: -1400 mL          MEDICATIONS  (STANDING):  acetaminophen   Tablet .. 650 milliGRAM(s) Oral every 6 hours  famotidine Injectable 20 milliGRAM(s) IV Push two times a day  labetalol 100 milliGRAM(s) Oral two times a day  magnesium gluconate 500 milliGRAM(s) Oral every 8 hours  nafcillin  IVPB 2 Gram(s) IV Intermittent every 4 hours  NIFEdipine XL 60 milliGRAM(s) Oral daily  vancomycin    Solution 125 milliGRAM(s) Oral every 6 hours  vitamin A &amp; D Ointment 1 Application(s) Topical two times a day    MEDICATIONS  (PRN):  aluminum hydroxide/magnesium hydroxide/simethicone Suspension 30 milliLiter(s) Oral every 4 hours PRN Dyspepsia  morphine  - Injectable 2 milliGRAM(s) IV Push every 4 hours PRN Mild Pain (1 - 3)  ondansetron Injectable 8 milliGRAM(s) IV Push every 8 hours PRN Nausea and/or Vomiting  oxycodone    5 mG/acetaminophen 325 mG 2 Tablet(s) Oral every 6 hours PRN Moderate Pain (4 - 6)      LABS: pend today                        13.9   17.87 )-----------( 337      ( 27 Jan 2020 07:52 )             42.7     01-27    136  |  97<L>  |  11  ----------------------------<  175<H>  4.0   |  28  |  0.5<L>    Ca    10.2<H>      27 Jan 2020 07:52  Mg     1.8     01-27            RADIOLOGY & ADDITIONAL STUDIES: none new

## 2020-01-29 NOTE — PROGRESS NOTE ADULT - ASSESSMENT
68yo female, currently a resident at a NH and PMH includes pulmonary embolism (on DOAC), Afib,  HTN, DM presents to the ER for "wound check" as NH reports the wound has cellulitis extending to the right mid and lower     abdominal wall along with drainage. Patient first had hernia repair to area in 2011 and subsequently revised in 2013. Son told ER wound has not healed correctly since 2013 procedure (Patient mentions that she has had     4 surgeries to that area). Patient was  on vancomycin and meropenem at her facility.     ASSESSMENT:  Principal Diagnosis:  cellulitis and abscess of right flank wound  Active C diff colitis  Hypokalemia & Hypomagnesemia     Associated Active Comorbid Conditions:  Morbid obesity. BMI 40  Diabetes mellitis type 2   Hypertension   chronic aifb   history of pulmonary embolism     PLAN:        - continue  Nafcillin and oral Vanco  - local wound care s/p I&D as per surgery  - Wound cx MSSA  - tolerating diet   - Hypokalemia - improved s/p supplementation   -Hypertension -  procardia, Labetalol 100 milligrams twice daily   -Insulin sliding scale with coverage with meals and QHS   - Morbid obesity-weight & dietary management   - Vaseline to lips  -GI Prophylaxis: Pepcid twice daily  -DVT Prophylaxis:  Xarelto    Discharge Disposition: NH when stable

## 2020-01-29 NOTE — PROGRESS NOTE ADULT - ASSESSMENT
68 yo female POD 2 s/p I&D abdominal wall abscess s/p dressing change        Plan:  - cont IV antibx  - FUP CBC today  - DSG change done - RN to change daily          All above d/w DR Banda and surgical team

## 2020-01-30 LAB
ANION GAP SERPL CALC-SCNC: 15 MMOL/L — HIGH (ref 7–14)
BUN SERPL-MCNC: 12 MG/DL — SIGNIFICANT CHANGE UP (ref 10–20)
CALCIUM SERPL-MCNC: 9.5 MG/DL — SIGNIFICANT CHANGE UP (ref 8.5–10.1)
CHLORIDE SERPL-SCNC: 100 MMOL/L — SIGNIFICANT CHANGE UP (ref 98–110)
CO2 SERPL-SCNC: 24 MMOL/L — SIGNIFICANT CHANGE UP (ref 17–32)
CREAT SERPL-MCNC: 0.6 MG/DL — LOW (ref 0.7–1.5)
CULTURE RESULTS: SIGNIFICANT CHANGE UP
GLUCOSE BLDC GLUCOMTR-MCNC: 101 MG/DL — HIGH (ref 70–99)
GLUCOSE BLDC GLUCOMTR-MCNC: 104 MG/DL — HIGH (ref 70–99)
GLUCOSE BLDC GLUCOMTR-MCNC: 112 MG/DL — HIGH (ref 70–99)
GLUCOSE BLDC GLUCOMTR-MCNC: 95 MG/DL — SIGNIFICANT CHANGE UP (ref 70–99)
GLUCOSE SERPL-MCNC: 103 MG/DL — HIGH (ref 70–99)
HCT VFR BLD CALC: 37.1 % — SIGNIFICANT CHANGE UP (ref 37–47)
HGB BLD-MCNC: 11.9 G/DL — LOW (ref 12–16)
MAGNESIUM SERPL-MCNC: 1.9 MG/DL — SIGNIFICANT CHANGE UP (ref 1.8–2.4)
MCHC RBC-ENTMCNC: 27.6 PG — SIGNIFICANT CHANGE UP (ref 27–31)
MCHC RBC-ENTMCNC: 32.1 G/DL — SIGNIFICANT CHANGE UP (ref 32–37)
MCV RBC AUTO: 86.1 FL — SIGNIFICANT CHANGE UP (ref 81–99)
NRBC # BLD: 0 /100 WBCS — SIGNIFICANT CHANGE UP (ref 0–0)
PLATELET # BLD AUTO: 247 K/UL — SIGNIFICANT CHANGE UP (ref 130–400)
POTASSIUM SERPL-MCNC: 3.6 MMOL/L — SIGNIFICANT CHANGE UP (ref 3.5–5)
POTASSIUM SERPL-SCNC: 3.6 MMOL/L — SIGNIFICANT CHANGE UP (ref 3.5–5)
RBC # BLD: 4.31 M/UL — SIGNIFICANT CHANGE UP (ref 4.2–5.4)
RBC # FLD: 13.7 % — SIGNIFICANT CHANGE UP (ref 11.5–14.5)
SODIUM SERPL-SCNC: 139 MMOL/L — SIGNIFICANT CHANGE UP (ref 135–146)
SPECIMEN SOURCE: SIGNIFICANT CHANGE UP
WBC # BLD: 12.51 K/UL — HIGH (ref 4.8–10.8)
WBC # FLD AUTO: 12.51 K/UL — HIGH (ref 4.8–10.8)

## 2020-01-30 PROCEDURE — 99233 SBSQ HOSP IP/OBS HIGH 50: CPT

## 2020-01-30 RX ORDER — LACTOBACILLUS ACIDOPHILUS 100MM CELL
1 CAPSULE ORAL DAILY
Refills: 0 | Status: DISCONTINUED | OUTPATIENT
Start: 2020-01-30 | End: 2020-02-05

## 2020-01-30 RX ADMIN — Medication 500 MILLIGRAM(S): at 06:15

## 2020-01-30 RX ADMIN — Medication 125 MILLIGRAM(S): at 06:16

## 2020-01-30 RX ADMIN — NAFCILLIN 200 GRAM(S): 10 INJECTION, POWDER, FOR SOLUTION INTRAVENOUS at 10:32

## 2020-01-30 RX ADMIN — NAFCILLIN 200 GRAM(S): 10 INJECTION, POWDER, FOR SOLUTION INTRAVENOUS at 06:15

## 2020-01-30 RX ADMIN — OXYCODONE AND ACETAMINOPHEN 2 TABLET(S): 5; 325 TABLET ORAL at 06:43

## 2020-01-30 RX ADMIN — Medication 125 MILLIGRAM(S): at 12:09

## 2020-01-30 RX ADMIN — NAFCILLIN 200 GRAM(S): 10 INJECTION, POWDER, FOR SOLUTION INTRAVENOUS at 22:38

## 2020-01-30 RX ADMIN — Medication 100 MILLIGRAM(S): at 06:15

## 2020-01-30 RX ADMIN — Medication 1 APPLICATION(S): at 18:11

## 2020-01-30 RX ADMIN — Medication 1 TABLET(S): at 12:08

## 2020-01-30 RX ADMIN — FAMOTIDINE 20 MILLIGRAM(S): 10 INJECTION INTRAVENOUS at 18:10

## 2020-01-30 RX ADMIN — Medication 125 MILLIGRAM(S): at 18:12

## 2020-01-30 RX ADMIN — Medication 100 MILLIGRAM(S): at 18:09

## 2020-01-30 RX ADMIN — NAFCILLIN 200 GRAM(S): 10 INJECTION, POWDER, FOR SOLUTION INTRAVENOUS at 02:35

## 2020-01-30 RX ADMIN — Medication 60 MILLIGRAM(S): at 06:15

## 2020-01-30 RX ADMIN — RIVAROXABAN 20 MILLIGRAM(S): KIT at 18:11

## 2020-01-30 RX ADMIN — NAFCILLIN 200 GRAM(S): 10 INJECTION, POWDER, FOR SOLUTION INTRAVENOUS at 14:37

## 2020-01-30 RX ADMIN — NAFCILLIN 200 GRAM(S): 10 INJECTION, POWDER, FOR SOLUTION INTRAVENOUS at 18:09

## 2020-01-30 RX ADMIN — Medication 1 APPLICATION(S): at 06:15

## 2020-01-30 RX ADMIN — Medication 500 MILLIGRAM(S): at 22:38

## 2020-01-30 RX ADMIN — Medication 500 MILLIGRAM(S): at 14:37

## 2020-01-30 RX ADMIN — Medication 125 MILLIGRAM(S): at 00:33

## 2020-01-30 RX ADMIN — OXYCODONE AND ACETAMINOPHEN 2 TABLET(S): 5; 325 TABLET ORAL at 16:26

## 2020-01-30 RX ADMIN — FAMOTIDINE 20 MILLIGRAM(S): 10 INJECTION INTRAVENOUS at 06:16

## 2020-01-30 NOTE — PROGRESS NOTE ADULT - SUBJECTIVE AND OBJECTIVE BOX
Patient is eating breakfast, denies pain currently       T(F): 98.1 (01-30-20 @ 05:47), Max: 98.1 (01-29-20 @ 14:20)  HR: 67 (01-30-20 @ 05:47)  BP: 126/63 (01-30-20 @ 05:47)  RR: 16 (01-30-20 @ 05:47)      PHYSICAL EXAM:  GENERAL: NAD  HEAD:  Atraumatic, Normocephalic  NERVOUS SYSTEM:  Alert & Oriented X3, no focal deficits   CHEST/LUNG: Clear to percussion bilaterally; No rales, rhonchi, wheezing, or rubs  HEART: Regular rate and rhythm; No murmurs, rubs, or gallops  ABDOMEN: Soft, R flank bandage   EXTREMITIES:  b/l  edema      LABS  01-29    137  |  99  |  12  ----------------------------<  100<H>  3.7   |  26  |  0.6<L>    Ca    9.7      29 Jan 2020 09:13  Mg     2.0     01-29    TPro  5.5<L>  /  Alb  3.1<L>  /  TBili  1.0  /  DBili  x   /  AST  21  /  ALT  24  /  AlkPhos  102  01-29                          11.9   12.51 )-----------( 247      ( 30 Jan 2020 07:04 )             37.1           Culture Results:   No growth (01-27-20)  Culture Results:   No growth (01-27-20)  Culture Results:   No growth at 5 days. (01-23-20)  Culture Results:   No growth at 5 days. (01-23-20)  Culture Results:   Few Staphylococcus aureus (01-23-20)      MEDICATIONS  (STANDING):  acetaminophen   Tablet .. 650 milliGRAM(s) Oral every 6 hours  famotidine Injectable 20 milliGRAM(s) IV Push two times a day  labetalol 100 milliGRAM(s) Oral two times a day  lactobacillus acidophilus 1 Tablet(s) Oral daily  magnesium gluconate 500 milliGRAM(s) Oral every 8 hours  nafcillin  IVPB 2 Gram(s) IV Intermittent every 4 hours  NIFEdipine XL 60 milliGRAM(s) Oral daily  rivaroxaban 20 milliGRAM(s) Oral with dinner  vancomycin    Solution 125 milliGRAM(s) Oral every 6 hours  vitamin A &amp; D Ointment 1 Application(s) Topical two times a day    MEDICATIONS  (PRN):  aluminum hydroxide/magnesium hydroxide/simethicone Suspension 30 milliLiter(s) Oral every 4 hours PRN Dyspepsia  morphine  - Injectable 2 milliGRAM(s) IV Push every 4 hours PRN Mild Pain (1 - 3)  ondansetron Injectable 8 milliGRAM(s) IV Push every 8 hours PRN Nausea and/or Vomiting  oxycodone    5 mG/acetaminophen 325 mG 2 Tablet(s) Oral every 6 hours PRN Moderate Pain (4 - 6)

## 2020-01-30 NOTE — PHYSICAL THERAPY INITIAL EVALUATION ADULT - GAIT DEVIATIONS NOTED, PT EVAL
decreased skyla/decreased weight-shifting ability/decreased heel strike / push off, forward flexed posture/increased time in double stance/decreased step length

## 2020-01-30 NOTE — PROGRESS NOTE ADULT - ASSESSMENT
68 yo female POD 2 s/p I&D abdominal wall abscess s/p dressing change        Plan:  - cont IV antibx  - DSG change done - RN to change daily  - no further surgical intervention

## 2020-01-30 NOTE — PROGRESS NOTE ADULT - ASSESSMENT
68yo female, currently a resident at a NH and PMH includes pulmonary embolism (on DOAC), Afib,  HTN, DM presents to the ER for "wound check" as NH reports the wound has cellulitis extending to the right mid and lower     abdominal wall along with drainage. Patient first had hernia repair to area in 2011 and subsequently revised in 2013. Son told ER wound has not healed correctly since 2013 procedure (Patient mentions that she has had     4 surgeries to that area). Patient was  on vancomycin and meropenem at her facility.     ASSESSMENT:  Principal Diagnosis:  cellulitis and abscess of right flank wound  Active C diff colitis  Hypokalemia & Hypomagnesemia     Associated Active Comorbid Conditions:  Morbid obesity. BMI 40  Diabetes mellitis type 2   Hypertension   chronic aifb   history of pulmonary embolism     PLAN:        - continue  Nafcillin and transition to oral Keflex upon DC until Feb10th  - continue oral vanco until Feb 24th   - local wound care s/p I&D as per surgery  - Wound cx MSSA  - tolerating diet   - Hypokalemia - improved s/p supplementation   -Hypertension -  procardia, Labetalol 100 milligrams twice daily   -Insulin sliding scale with coverage with meals and QHS   - Morbid obesity-weight & dietary management   - Vaseline to lips  -DVT Prophylaxis:  Xarelto    Discharge Disposition: may DC to NH  when bed available   - pt and physiatry consults placed

## 2020-01-30 NOTE — PROGRESS NOTE ADULT - SUBJECTIVE AND OBJECTIVE BOX
S: No significant change in patient; abdominal dressing changed by RN this AM  O; Vital Signs Last 24 Hrs  T(C): 36.7 (30 Jan 2020 05:47), Max: 36.7 (29 Jan 2020 14:20)  T(F): 98.1 (30 Jan 2020 05:47), Max: 98.1 (29 Jan 2020 14:20)  HR: 67 (30 Jan 2020 05:47) (63 - 82)  BP: 126/63 (30 Jan 2020 05:47) (126/63 - 160/75)  BP(mean): --  RR: 16 (30 Jan 2020 05:47) (16 - 16)  SpO2: --    EXAM:  abd: soft, right sided incision approx 10cm open with packing and some exudate; mild tenderness    Labs:  CAPILLARY BLOOD GLUCOSE      POCT Blood Glucose.: 95 mg/dL (30 Jan 2020 07:33)  POCT Blood Glucose.: 124 mg/dL (29 Jan 2020 20:11)  POCT Blood Glucose.: 122 mg/dL (29 Jan 2020 16:55)  POCT Blood Glucose.: 103 mg/dL (29 Jan 2020 11:46)                          11.9   12.51 )-----------( 247      ( 30 Jan 2020 07:04 )             37.1         01-30    139  |  100  |  12  ----------------------------<  103<H>  3.6   |  24  |  0.6<L>      Calcium, Total Serum: 9.5 mg/dL (01-30-20 @ 07:04)      LFTs:             5.5  | 1.0  | 21       ------------------[102     ( 29 Jan 2020 09:13 )  3.1  | x    | 24          Lipase:x      Amylase:x             Coags:            Culture - Other (collected 27 Jan 2020 16:50)  Source: Wound C&amp;S OF DEEP ABDOMINAL WOUND  Preliminary Report (29 Jan 2020 11:03):    No growth    Culture - Other (collected 27 Jan 2020 16:32)  Source: Wound C&amp;S OF ABDOMINAL WOUND  Preliminary Report (29 Jan 2020 11:04):    No growth

## 2020-01-30 NOTE — PHYSICAL THERAPY INITIAL EVALUATION ADULT - IMPAIRMENTS FOUND, PT EVAL
aerobic capacity/endurance/gait, locomotion, and balance/ergonomics and body mechanics/posture/muscle strength

## 2020-01-30 NOTE — PHYSICAL THERAPY INITIAL EVALUATION ADULT - TRANSFER SAFETY CONCERNS NOTED: SIT/STAND, REHAB EVAL
decreased balance during turns/decreased step length/decreased weight-shifting ability/losing balance/decreased safety awareness

## 2020-01-30 NOTE — PHYSICAL THERAPY INITIAL EVALUATION ADULT - GENERAL OBSERVATIONS, REHAB EVAL
14:55 - 15:20. Chart reviewed. Patient available to be seen for physical therapy, confirmed with nurse. Patient encountered semi-reclined in bed, +IV, +prima fit. Denies pain at rest, agreeable for PT evaluation.

## 2020-01-31 ENCOUNTER — TRANSCRIPTION ENCOUNTER (OUTPATIENT)
Age: 70
End: 2020-01-31

## 2020-01-31 LAB
-  AMPICILLIN/SULBACTAM: SIGNIFICANT CHANGE UP
-  CEFAZOLIN: SIGNIFICANT CHANGE UP
-  CLINDAMYCIN: SIGNIFICANT CHANGE UP
-  ERYTHROMYCIN: SIGNIFICANT CHANGE UP
-  GENTAMICIN: SIGNIFICANT CHANGE UP
-  OXACILLIN: SIGNIFICANT CHANGE UP
-  RIFAMPIN: SIGNIFICANT CHANGE UP
-  TETRACYCLINE: SIGNIFICANT CHANGE UP
-  TRIMETHOPRIM/SULFAMETHOXAZOLE: SIGNIFICANT CHANGE UP
-  VANCOMYCIN: SIGNIFICANT CHANGE UP
ANION GAP SERPL CALC-SCNC: 13 MMOL/L — SIGNIFICANT CHANGE UP (ref 7–14)
BUN SERPL-MCNC: 9 MG/DL — LOW (ref 10–20)
CALCIUM SERPL-MCNC: 9.7 MG/DL — SIGNIFICANT CHANGE UP (ref 8.5–10.1)
CHLORIDE SERPL-SCNC: 103 MMOL/L — SIGNIFICANT CHANGE UP (ref 98–110)
CO2 SERPL-SCNC: 23 MMOL/L — SIGNIFICANT CHANGE UP (ref 17–32)
CREAT SERPL-MCNC: 0.6 MG/DL — LOW (ref 0.7–1.5)
CULTURE RESULTS: SIGNIFICANT CHANGE UP
GLUCOSE BLDC GLUCOMTR-MCNC: 100 MG/DL — HIGH (ref 70–99)
GLUCOSE SERPL-MCNC: 111 MG/DL — HIGH (ref 70–99)
HCT VFR BLD CALC: 38.2 % — SIGNIFICANT CHANGE UP (ref 37–47)
HGB BLD-MCNC: 12.1 G/DL — SIGNIFICANT CHANGE UP (ref 12–16)
MAGNESIUM SERPL-MCNC: 1.9 MG/DL — SIGNIFICANT CHANGE UP (ref 1.8–2.4)
MCHC RBC-ENTMCNC: 27.5 PG — SIGNIFICANT CHANGE UP (ref 27–31)
MCHC RBC-ENTMCNC: 31.7 G/DL — LOW (ref 32–37)
MCV RBC AUTO: 86.8 FL — SIGNIFICANT CHANGE UP (ref 81–99)
METHOD TYPE: SIGNIFICANT CHANGE UP
NRBC # BLD: 0 /100 WBCS — SIGNIFICANT CHANGE UP (ref 0–0)
ORGANISM # SPEC MICROSCOPIC CNT: SIGNIFICANT CHANGE UP
ORGANISM # SPEC MICROSCOPIC CNT: SIGNIFICANT CHANGE UP
PLATELET # BLD AUTO: 247 K/UL — SIGNIFICANT CHANGE UP (ref 130–400)
POTASSIUM SERPL-MCNC: 3.4 MMOL/L — LOW (ref 3.5–5)
POTASSIUM SERPL-SCNC: 3.4 MMOL/L — LOW (ref 3.5–5)
RBC # BLD: 4.4 M/UL — SIGNIFICANT CHANGE UP (ref 4.2–5.4)
RBC # FLD: 13.8 % — SIGNIFICANT CHANGE UP (ref 11.5–14.5)
SODIUM SERPL-SCNC: 139 MMOL/L — SIGNIFICANT CHANGE UP (ref 135–146)
SPECIMEN SOURCE: SIGNIFICANT CHANGE UP
SURGICAL PATHOLOGY STUDY: SIGNIFICANT CHANGE UP
WBC # BLD: 11.49 K/UL — HIGH (ref 4.8–10.8)
WBC # FLD AUTO: 11.49 K/UL — HIGH (ref 4.8–10.8)

## 2020-01-31 PROCEDURE — 99233 SBSQ HOSP IP/OBS HIGH 50: CPT

## 2020-01-31 RX ORDER — LACTOBACILLUS ACIDOPHILUS 100MM CELL
1 CAPSULE ORAL
Qty: 0 | Refills: 0 | DISCHARGE
Start: 2020-01-31

## 2020-01-31 RX ORDER — VANCOMYCIN HCL 1 G
0 VIAL (EA) INTRAVENOUS
Qty: 0 | Refills: 0 | DISCHARGE

## 2020-01-31 RX ORDER — ACETAMINOPHEN 500 MG
0 TABLET ORAL
Qty: 0 | Refills: 0 | DISCHARGE

## 2020-01-31 RX ORDER — POTASSIUM CHLORIDE 20 MEQ
40 PACKET (EA) ORAL DAILY
Refills: 0 | Status: DISCONTINUED | OUTPATIENT
Start: 2020-01-31 | End: 2020-02-05

## 2020-01-31 RX ORDER — MEROPENEM 1 G/30ML
0 INJECTION INTRAVENOUS
Qty: 0 | Refills: 0 | DISCHARGE

## 2020-01-31 RX ADMIN — Medication 40 MILLIEQUIVALENT(S): at 12:51

## 2020-01-31 RX ADMIN — Medication 500 MILLIGRAM(S): at 22:21

## 2020-01-31 RX ADMIN — Medication 100 MILLIGRAM(S): at 06:04

## 2020-01-31 RX ADMIN — FAMOTIDINE 20 MILLIGRAM(S): 10 INJECTION INTRAVENOUS at 18:10

## 2020-01-31 RX ADMIN — NAFCILLIN 200 GRAM(S): 10 INJECTION, POWDER, FOR SOLUTION INTRAVENOUS at 10:38

## 2020-01-31 RX ADMIN — Medication 1 TABLET(S): at 12:51

## 2020-01-31 RX ADMIN — Medication 60 MILLIGRAM(S): at 06:04

## 2020-01-31 RX ADMIN — FAMOTIDINE 20 MILLIGRAM(S): 10 INJECTION INTRAVENOUS at 06:04

## 2020-01-31 RX ADMIN — Medication 500 MILLIGRAM(S): at 06:04

## 2020-01-31 RX ADMIN — Medication 100 MILLIGRAM(S): at 18:11

## 2020-01-31 RX ADMIN — Medication 125 MILLIGRAM(S): at 00:21

## 2020-01-31 RX ADMIN — Medication 125 MILLIGRAM(S): at 23:54

## 2020-01-31 RX ADMIN — NAFCILLIN 200 GRAM(S): 10 INJECTION, POWDER, FOR SOLUTION INTRAVENOUS at 02:21

## 2020-01-31 RX ADMIN — Medication 125 MILLIGRAM(S): at 06:04

## 2020-01-31 RX ADMIN — Medication 1 APPLICATION(S): at 06:04

## 2020-01-31 RX ADMIN — NAFCILLIN 200 GRAM(S): 10 INJECTION, POWDER, FOR SOLUTION INTRAVENOUS at 06:04

## 2020-01-31 RX ADMIN — NAFCILLIN 200 GRAM(S): 10 INJECTION, POWDER, FOR SOLUTION INTRAVENOUS at 14:26

## 2020-01-31 RX ADMIN — NAFCILLIN 200 GRAM(S): 10 INJECTION, POWDER, FOR SOLUTION INTRAVENOUS at 22:21

## 2020-01-31 RX ADMIN — Medication 125 MILLIGRAM(S): at 13:43

## 2020-01-31 RX ADMIN — NAFCILLIN 200 GRAM(S): 10 INJECTION, POWDER, FOR SOLUTION INTRAVENOUS at 18:10

## 2020-01-31 RX ADMIN — Medication 500 MILLIGRAM(S): at 14:26

## 2020-01-31 RX ADMIN — RIVAROXABAN 20 MILLIGRAM(S): KIT at 18:11

## 2020-01-31 RX ADMIN — Medication 125 MILLIGRAM(S): at 18:09

## 2020-01-31 RX ADMIN — Medication 1 APPLICATION(S): at 18:11

## 2020-01-31 NOTE — PROGRESS NOTE ADULT - ASSESSMENT
70yo female, currently a resident at a NH and PMH includes pulmonary embolism (on DOAC), Afib,  HTN, DM presents to the ER for "wound check" as NH reports the wound has cellulitis extending to the right mid and lower     abdominal wall along with drainage. Patient first had hernia repair to area in 2011 and subsequently revised in 2013. Son told ER wound has not healed correctly since 2013 procedure (Patient mentions that she has had     4 surgeries to that area). Patient was  on vancomycin and meropenem at her facility.     ASSESSMENT:  Principal Diagnosis:  cellulitis and abscess of right flank wound  Active C diff colitis  Hypokalemia & Hypomagnesemia     Associated Active Comorbid Conditions:  Morbid obesity. BMI 40  Diabetes mellitis type 2   Hypertension   chronic aifb   history of pulmonary embolism     PLAN:      - s/p I&D by surgery   - continue  Nafcillin and transition to oral Keflex upon DC until Feb10th  - continue oral vanco until Feb 24th   - local wound care s/p I&D as per surgery  - Wound cx MSSA  - tolerating diet   - hypokalemia / hypomagnesemia - improved s/p supplementation   -Hypertension -  procardia, Labetalol 100 milligrams twice daily   -Insulin sliding scale with coverage with meals and QHS   - Morbid obesity-weight & dietary management   - Vaseline to lips  -DVT Prophylaxis:  Xarelto    Discharge Disposition: may DC to NH  when bed available 68yo female, currently a resident at a NH and PMH includes pulmonary embolism (on DOAC), Afib,  HTN, DM presents to the ER for "wound check" as NH reports the wound has cellulitis extending to the right mid and lower     abdominal wall along with drainage. Patient first had hernia repair to area in 2011 and subsequently revised in 2013. Son told ER wound has not healed correctly since 2013 procedure (Patient mentions that she has had     4 surgeries to that area). Patient was  on vancomycin and meropenem at her facility.     ASSESSMENT:  Principal Diagnosis:  cellulitis and abscess of right flank wound  Active C diff colitis  Hypokalemia & Hypomagnesemia     Associated Active Comorbid Conditions:  Morbid obesity. BMI 40  Diabetes mellitis type 2   Hypertension   chronic aifb   history of pulmonary embolism     PLAN:      - s/p I&D by surgery   - continue  Nafcillin and transition to oral Keflex upon DC until Feb10th  - continue oral vanco until Feb 24th   - local wound care s/p I&D as per surgery  - Wound cx MSSA  - tolerating diet   - hypokalemia / hypomagnesemia - improved s/p supplementation   -Hypertension -  procardia, Labetalol 100 milligrams twice daily   -Insulin sliding scale with coverage with meals and QHS   - Morbid obesity-weight & dietary management   - Vaseline to lips  -DVT Prophylaxis:  Xarelto    Discharge Disposition: may DC to NH  when bed available  DC note and med rec completed

## 2020-01-31 NOTE — DISCHARGE NOTE PROVIDER - NSDCCPCAREPLAN_GEN_ALL_CORE_FT
PRINCIPAL DISCHARGE DIAGNOSIS  Diagnosis: Abdominal wall abscess at site of surgical wound  Assessment and Plan of Treatment: complete course of antibiotics until Feb 10th  clean wound with saline daily  pack wound with kerlix daily and prn if soaked  daily dressing with antimicrobial NS moistened AMD kerlix  surgery f/u PRINCIPAL DISCHARGE DIAGNOSIS  Diagnosis: Abdominal wall abscess at site of surgical wound  Assessment and Plan of Treatment: complete course of antibiotics until Feb 10th  clean wound with saline daily  pack wound with kerlix daily and prn if soaked  daily dressing with antimicrobial NS moistened AMD kerlix  surgery f/u PLEASE FOLLOW UP WITH DR. VIEIRA IN ONE WEEK.

## 2020-01-31 NOTE — DISCHARGE NOTE PROVIDER - PROVIDER TOKENS
PROVIDER:[TOKEN:[[id number]]] PROVIDER:[TOKEN:[[id number]]],PROVIDER:[TOKEN:[[id number]]]

## 2020-01-31 NOTE — PROGRESS NOTE ADULT - SUBJECTIVE AND OBJECTIVE BOX
Patient is comfortable in bed, no complaints       T(F): 98.1 (01-31-20 @ 05:05), Max: 98.2 (01-30-20 @ 22:11)  HR: 61 (01-31-20 @ 05:05)  BP: 124/58 (01-31-20 @ 05:05)  RR: 16 (01-31-20 @ 05:05)      PHYSICAL EXAM:  GENERAL: NAD  HEAD:  Atraumatic, Norocephalic  NERVOUS SYSTEM:  Alert & Oriented X3, no focal deficit   CHEST/LUNG: Clear to percussion bilaterally; No rales, rhonchi, wheezing, or rubs  HEART: Regular rate and rhythm; No murmurs, rubs, or gallops  ABDOMEN: Soft, R flank bandage   EXTREMITIES:  2+ Peripheral Pulses, No clubbing, cyanosis, or edema  LYMPH: No lymphadenopathy noted  SKIN: No rashes or lesions    LABS  01-31    139  |  103  |  9<L>  ----------------------------<  111<H>  3.4<L>   |  23  |  0.6<L>    Ca    9.7      31 Jan 2020 06:31  Mg     1.9     01-31                            12.1   11.49 )-----------( 247      ( 31 Jan 2020 06:31 )             38.2       Culture Results:   No growth (01-27-20)  Culture Results:   No growth at 48 hours (01-27-20)  Culture Results:   No growth at 5 days. (01-23-20)  Culture Results:   No growth at 5 days. (01-23-20)  Culture Results:   Few Staphylococcus aureus (01-23-20)      MEDICATIONS  (STANDING):  acetaminophen   Tablet .. 650 milliGRAM(s) Oral every 6 hours  famotidine Injectable 20 milliGRAM(s) IV Push two times a day  labetalol 100 milliGRAM(s) Oral two times a day  lactobacillus acidophilus 1 Tablet(s) Oral daily  magnesium gluconate 500 milliGRAM(s) Oral every 8 hours  nafcillin  IVPB 2 Gram(s) IV Intermittent every 4 hours  NIFEdipine XL 60 milliGRAM(s) Oral daily  rivaroxaban 20 milliGRAM(s) Oral with dinner  vancomycin    Solution 125 milliGRAM(s) Oral every 6 hours  vitamin A &amp; D Ointment 1 Application(s) Topical two times a day    MEDICATIONS  (PRN):  aluminum hydroxide/magnesium hydroxide/simethicone Suspension 30 milliLiter(s) Oral every 4 hours PRN Dyspepsia  morphine  - Injectable 2 milliGRAM(s) IV Push every 4 hours PRN Mild Pain (1 - 3)  ondansetron Injectable 8 milliGRAM(s) IV Push every 8 hours PRN Nausea and/or Vomiting  oxycodone    5 mG/acetaminophen 325 mG 2 Tablet(s) Oral every 6 hours PRN Moderate Pain (4 - 6)

## 2020-01-31 NOTE — CONSULT NOTE ADULT - SUBJECTIVE AND OBJECTIVE BOX
HPI: 69 y o female, currently a resident at a NH and PMH includes pulmonary embolism (on DOAC), presents to the ER for "wound check" as NH reports the wound has cellulitis extending to the right mid and lower abdominal wall along with drainage. Patient first had hernia repair to area in 2011 and subsequently revised in 2013. Son told ER wound has not healed correctly since 2013 procedure (Patient mentions that she has had 4 surgeries to that area). Patient is on vancomycin and meropenem at her facility. Not complaining of pain ptn  seen and exame  ptn  known  to me  from  [facility]     PTN  REFERRED TO ACUTE  REHAB  FOR  EVAL AND  TX   PAST MEDICAL & SURGICAL HISTORY:  Gastroesophageal reflux disease  Obesity  Diabetes mellitus  HTN (hypertension)  History of cholecystectomy  H/O hernia repair: 2011 and revised in 2013      Hospital Course:    TODAY'S SUBJECTIVE & REVIEW OF SYMPTOMS:     Constitutional WNL   Cardio WNL   Resp WNL   GI WNL  Heme WNL  Endo WNL  Skin WNL  MSK WNL  Neuro WNL  Cognitive WNL  Psych WNL      MEDICATIONS  (STANDING):  acetaminophen   Tablet .. 650 milliGRAM(s) Oral every 6 hours  famotidine Injectable 20 milliGRAM(s) IV Push two times a day  labetalol 100 milliGRAM(s) Oral two times a day  lactobacillus acidophilus 1 Tablet(s) Oral daily  magnesium gluconate 500 milliGRAM(s) Oral every 8 hours  nafcillin  IVPB 2 Gram(s) IV Intermittent every 4 hours  NIFEdipine XL 60 milliGRAM(s) Oral daily  rivaroxaban 20 milliGRAM(s) Oral with dinner  vancomycin    Solution 125 milliGRAM(s) Oral every 6 hours  vitamin A &amp; D Ointment 1 Application(s) Topical two times a day    MEDICATIONS  (PRN):  aluminum hydroxide/magnesium hydroxide/simethicone Suspension 30 milliLiter(s) Oral every 4 hours PRN Dyspepsia  morphine  - Injectable 2 milliGRAM(s) IV Push every 4 hours PRN Mild Pain (1 - 3)  ondansetron Injectable 8 milliGRAM(s) IV Push every 8 hours PRN Nausea and/or Vomiting  oxycodone    5 mG/acetaminophen 325 mG 2 Tablet(s) Oral every 6 hours PRN Moderate Pain (4 - 6)      FAMILY HISTORY:      Allergies    No Known Allergies    Intolerances        SOCIAL HISTORY:    [  ] Etoh  [  ] Smoking  [  ] Substance abuse     Home Environment:  [  ] Home Alone  [  ] Lives with Family  [  ] Home Health Aid    Dwelling:  [  ] Apartment  [  ] Private House  [  ] Adult Home  [ x ] Skilled Nursing Facility      [x  ] Short Term  [  x] Long Term  [  ] Stairs       Elevator [  ]    FUNCTIONAL STATUS PTA: (Check all that apply)  Ambulation: [   ]Independent    [ x ] Dependent     [  ] Non-Ambulatory  Assistive Device: [  ] SA Cane  [  ]  Q Cane  [ x ] Walker  [ x ]  Wheelchair  ADL : [ x ] Independent  [x  ]  Dependent       Vital Signs Last 24 Hrs  T(C): 36.7 (31 Jan 2020 05:05), Max: 36.8 (30 Jan 2020 22:11)  T(F): 98.1 (31 Jan 2020 05:05), Max: 98.2 (30 Jan 2020 22:11)  HR: 61 (31 Jan 2020 05:05) (56 - 61)  BP: 124/58 (31 Jan 2020 05:05) (124/58 - 144/67)  BP(mean): --  RR: 16 (31 Jan 2020 05:05) (16 - 18)  SpO2: --      PHYSICAL EXAM: Alert & Oriented X3  GENERAL: NAD, well-groomed, well-developed  HEAD:  Atraumatic, Normocephalic  EYES: EOMI, PERRLA, conjunctiva and sclera clear  NECK: Supple, No JVD, Normal thyroid  CHEST/LUNG: Clear to percussion bilaterally; No rales, rhonchi, wheezing, or rubs  HEART: Regular rate and rhythm; No murmurs, rubs, or gallops  ABDOMEN: Soft, Nontender, Nondistended; Bowel sounds present  EXTREMITIES:  2+ Peripheral Pulses, No clubbing, cyanosis, or edema    NERVOUS SYSTEM:  Cranial Nerves 2-12 intact [ x ] Abnormal  [  ]  ROM: WFL all extremities [  ]  Abnormal [  x]  Motor Strength: WFL all extremities  [  ]  Abnormal [ 4/5 ue  3/5  le ]  Sensation: intact to light touch [  ] Abnormal [ x ]  Reflexes: Symmetric [  ]  Abnormal [ x ]    FUNCTIONAL STATUS:  Bed Mobility: Independent [  ]  Supervision [  ]  Needs Assistance [x  ]  N/A [  ]  Transfers: Independent [  ]  Supervision [  ]  Needs Assistance [x  ]  N/A [  ]   Ambulation: Independent [  ]  Supervision [  ]  Needs Assistance [x  ]  N/A [  ]  ADL: Independent [  ] Requires Assistance [  ] N/A [ x ]  SEE PT/OT IE NOTES    LABS:                        12.1   11.49 )-----------( 247      ( 31 Jan 2020 06:31 )             38.2     01-31    139  |  103  |  9<L>  ----------------------------<  111<H>  3.4<L>   |  23  |  0.6<L>    Ca    9.7      31 Jan 2020 06:31  Mg     1.9     01-31            RADIOLOGY & ADDITIONAL STUDIES:    Assesment:

## 2020-01-31 NOTE — DISCHARGE NOTE PROVIDER - CARE PROVIDER_API CALL
Halie Banda)  Surgery  [address] [place] [address]  Phone: [phone]  Fax: [phone]  Follow Up Time: Halie Banda)  Surgery  [address] [place] [address]  Phone: [phone]  Fax: [phone]  Follow Up Time:     Binu Serna)  Infectious Disease; Internal Medicine  [address] [place] [address]  Phone: [phone]  Fax: [phone]  Follow Up Time:

## 2020-01-31 NOTE — DISCHARGE NOTE PROVIDER - NSDCMRMEDTOKEN_GEN_ALL_CORE_FT
Acidophilus oral capsule: 1 tab(s) orally 3 times a day  Keflex 500 mg oral capsule: 1 tab(s) orally 4 times a day  until FEB 10th  Mag-G 500 mg oral tablet: 1 tab(s) orally every 8 hours  NIFEdipine 30 mg oral tablet, extended release: 1 tab(s) orally once a day  oxycodone-acetaminophen 5 mg-325 mg oral tablet: 1 tab(s) orally every 6 hours, As Needed - 6) for severe pain   potassium chloride 10 mEq oral capsule, extended release: 2 cap(s) orally once a day  rivaroxaban 20 mg oral tablet: 1 tab(s) orally once a day (before a meal)  Santyl:   vancomycin: 125 milligram(s) orally 4 times a day  until FEB 24th  vitamin A &amp; D topical ointment: 1 application topically 2 times a day Acidophilus oral capsule: 1 tab(s) orally 3 times a day  Keflex 500 mg oral capsule: 1 tab(s) orally 4 times a day  until FEB 10th  labetalol 100 mg oral tablet: 1 tab(s) orally 2 times a day  Mag-G 500 mg oral tablet: 1 tab(s) orally every 8 hours  NIFEdipine 60 mg oral tablet, extended release: 1 tab(s) orally once a day  oxycodone-acetaminophen 5 mg-325 mg oral tablet: 1 tab(s) orally every 6 hours, As Needed - 6) for severe pain   potassium chloride 10 mEq oral capsule, extended release: 2 cap(s) orally once a day  rivaroxaban 20 mg oral tablet: 1 tab(s) orally once a day (before a meal)  Santyl:   vancomycin: 125 milligram(s) orally 4 times a day  until FEB 24th  vitamin A &amp; D topical ointment: 1 application topically 2 times a day

## 2020-01-31 NOTE — DISCHARGE NOTE PROVIDER - HOSPITAL COURSE
68yo female, currently a resident at a NH and PMH includes pulmonary embolism (on DOAC), Afib,  HTN, DM presents to the ER for "wound check" as NH reports the wound has cellulitis extending to the right mid and lower      abdominal wall along with drainage. Patient first had hernia repair to area in 2011 and subsequently revised in 2013. Son told ER wound has not healed correctly since 2013 procedure (Patient mentions that she has had      4 surgeries to that area). Patient was  on vancomycin and meropenem at her facility.         In the ED CT abdomen revealed    < from: CT Abdomen and Pelvis w/ IV Cont (01.23.20 @ 01:47) >        IMPRESSION:         Persistent right flank inflammatory change with oblong subcutaneous abscess measuring up to 6.3 cm craniocaudal in the right flank subcutaneous fat.        cultures grew MSSA    - s/p I&D by surgery     - continue  Nafcillin and transition to oral Keflex upon DC until Feb10th    - continue oral vanco until Feb 24th     - daily packing and DSG changes as per surgery    - hypokalemia / hypomagnesemia - s/p supplementation     -Hypertension -  procardia, Labetalol 100 milligrams twice daily     -Insulin sliding scale with coverage with meals and QHS     - Morbid obesity-weight & dietary management         -DVT Prophylaxis:  Xarelto        Discharge Disposition: may DC to NH  when bed available 68yo female, currently a resident at a NH and PMH includes pulmonary embolism (on DOAC), Afib,  HTN, DM presents to the ER for "wound check" as NH reports the wound has cellulitis extending to the right mid and lower      abdominal wall along with drainage. Patient first had hernia repair to area in 2011 and subsequently revised in 2013. Son told ER wound has not healed correctly since 2013 procedure (Patient mentions that she has had      4 surgeries to that area). Patient was  on vancomycin and meropenem at her facility.         She was noted to have R flank abscess 6.3 cm, s/p I&D. Wcx with mssa, seen by ID. continued on nafcillin. Noted to have cdiff, started on vanco. She was stable for d/c on 2/5, to cont keflex until 2/10, po vanco until 2/24. Needs outpt pmd, surgery, ID f/u in one week.         31 minutes spent on discharge planning.

## 2020-01-31 NOTE — CONSULT NOTE ADULT - ASSESSMENT
IMPRESSION: Rehab of 68 y/o  f rehab  for  debility      PRECAUTIONS: [  ] Cardiac  [  ] Respiratory  [  ] Seizures [ xx ] Contact Isolation  [  ] Droplet Isolation  [ FALL ] Other    Weight Bearing Status:     RECOMMENDATION:    Out of Bed to Chair     DVT/Decubiti Prophylaxis    REHAB PLAN:     [  x ] Bedside P/T 3-5 times a week   [   ]   Bedside O/T  2-3 times a week             [   ] No Rehab Therapy Indicated                   [   ]  Speech Therapy   Conditioning/ROM                                    ADL  Bed Mobility                                               Conditioning/ROM  Transfers                                                     Bed Mobility  Sitting /Standing Balance                         Transfers                                        Gait Training                                               Sitting/Standing Balance  Stair Training [   ]Applicable                    Home equipment Eval                                                                        Splinting  [   ] Only      GOALS:   ADL   [   ]   Independent                    Transfers  [   ] Independent                          Ambulation  [   ] Independent     [    ] With device                            [   ]  CG                                                         [   ]  CG                                                                  [   ] CG                            [    ] Min A                                                   [   ] Min A                                                              [   ] Min  A          DISCHARGE PLAN:   [   ]  Good candidate for Intensive Rehabilitation/Hospital based-[facility]A [facility]                                             Will tolerate 3hrs Intensive Rehab Daily                                       [  xx  ]  Short Term Rehab in Skilled Nursing Facility cont  current care                                         [    ]  Home with Outpatient or VN services                                         [    ]  Possible Candidate for Intensive Hospital based Rehab

## 2020-02-01 PROCEDURE — 99231 SBSQ HOSP IP/OBS SF/LOW 25: CPT

## 2020-02-01 RX ADMIN — ONDANSETRON 8 MILLIGRAM(S): 8 TABLET, FILM COATED ORAL at 17:44

## 2020-02-01 RX ADMIN — NAFCILLIN 200 GRAM(S): 10 INJECTION, POWDER, FOR SOLUTION INTRAVENOUS at 22:35

## 2020-02-01 RX ADMIN — Medication 40 MILLIEQUIVALENT(S): at 10:57

## 2020-02-01 RX ADMIN — Medication 125 MILLIGRAM(S): at 10:58

## 2020-02-01 RX ADMIN — NAFCILLIN 200 GRAM(S): 10 INJECTION, POWDER, FOR SOLUTION INTRAVENOUS at 14:49

## 2020-02-01 RX ADMIN — Medication 650 MILLIGRAM(S): at 10:56

## 2020-02-01 RX ADMIN — Medication 100 MILLIGRAM(S): at 17:45

## 2020-02-01 RX ADMIN — Medication 500 MILLIGRAM(S): at 06:27

## 2020-02-01 RX ADMIN — Medication 125 MILLIGRAM(S): at 06:28

## 2020-02-01 RX ADMIN — Medication 1 APPLICATION(S): at 17:45

## 2020-02-01 RX ADMIN — Medication 60 MILLIGRAM(S): at 06:27

## 2020-02-01 RX ADMIN — Medication 125 MILLIGRAM(S): at 17:45

## 2020-02-01 RX ADMIN — Medication 500 MILLIGRAM(S): at 14:50

## 2020-02-01 RX ADMIN — NAFCILLIN 200 GRAM(S): 10 INJECTION, POWDER, FOR SOLUTION INTRAVENOUS at 10:42

## 2020-02-01 RX ADMIN — NAFCILLIN 200 GRAM(S): 10 INJECTION, POWDER, FOR SOLUTION INTRAVENOUS at 02:35

## 2020-02-01 RX ADMIN — FAMOTIDINE 20 MILLIGRAM(S): 10 INJECTION INTRAVENOUS at 06:26

## 2020-02-01 RX ADMIN — Medication 125 MILLIGRAM(S): at 23:59

## 2020-02-01 RX ADMIN — NAFCILLIN 200 GRAM(S): 10 INJECTION, POWDER, FOR SOLUTION INTRAVENOUS at 17:46

## 2020-02-01 RX ADMIN — RIVAROXABAN 20 MILLIGRAM(S): KIT at 17:44

## 2020-02-01 RX ADMIN — FAMOTIDINE 20 MILLIGRAM(S): 10 INJECTION INTRAVENOUS at 17:45

## 2020-02-01 RX ADMIN — Medication 1 TABLET(S): at 10:57

## 2020-02-01 RX ADMIN — Medication 500 MILLIGRAM(S): at 22:36

## 2020-02-01 RX ADMIN — NAFCILLIN 200 GRAM(S): 10 INJECTION, POWDER, FOR SOLUTION INTRAVENOUS at 06:27

## 2020-02-01 RX ADMIN — Medication 100 MILLIGRAM(S): at 06:27

## 2020-02-01 RX ADMIN — Medication 650 MILLIGRAM(S): at 14:34

## 2020-02-01 NOTE — PROGRESS NOTE ADULT - ASSESSMENT
70yo female, currently a resident at a NH and PMH includes pulmonary embolism (on DOAC), Afib,  HTN, DM presents to the ER for "wound check" as NH reports the wound has cellulitis extending to the right mid and lower     abdominal wall along with drainage. Patient first had hernia repair to area in 2011 and subsequently revised in 2013. Son told ER wound has not healed correctly since 2013 procedure (Patient mentions that she has had     4 surgeries to that area). Patient was  on vancomycin and meropenem at her facility.     ASSESSMENT:  Principal Diagnosis:  cellulitis and abscess of right flank wound  Active C diff colitis  Hypokalemia & Hypomagnesemia     Associated Active Comorbid Conditions:  Morbid obesity. BMI 40  Diabetes mellitis type 2   Hypertension   chronic aifb   history of pulmonary embolism     PLAN:      - s/p I&D by surgery   - continue  Nafcillin and transition to oral Keflex upon DC until Feb10th  - continue oral vanco until Feb 24th   - local wound care s/p I&D as per surgery  - Wound cx MSSA  - tolerating diet   - hypokalemia / hypomagnesemia - improved s/p supplementation   -Hypertension -  procardia, Labetalol 100 milligrams twice daily   -Insulin sliding scale with coverage with meals and QHS   - Morbid obesity-weight & dietary management   - Vaseline to lips    DVT, GI prophylaxis  Disposition Planning :Awaiting bed availability in NH    Pager No.  [phone]

## 2020-02-01 NOTE — DIETITIAN INITIAL EVALUATION ADULT. - PERTINENT MEDS FT
xarelto, nafcillin, vanco, kcl-, acidophillus, maalox, pepcid, labetalol, morphine, procardia, zofran, percocet

## 2020-02-01 NOTE — DIETITIAN INITIAL EVALUATION ADULT. - OTHER INFO
69 year old female from SNF with hx of DM, gerd, HTN, obesity, hernia repair in 2011 with revision in 2013, PE, choley presents with cellulitis to abd wound with drainage sustained during hernia repair in 2011, admitted with sepsis, s/p bedside I & D on 1/23. + cdiff noted. + wound cultures for staph aureus.  1/24 s/p coffee ground emesis x2 . PPI ordered. 1/27 s/p OR for debridement of abd wall. D/C pending to SNF.

## 2020-02-01 NOTE — DIETITIAN INITIAL EVALUATION ADULT. - ENERGY NEEDS
2282-1140 kcals (12-16 kcals/kg/BW) 2/2 BMI > 30  86-114g po (1.5-2.0g/kg/IBW)  1;1 kca for estimated fluid needs

## 2020-02-01 NOTE — PROGRESS NOTE ADULT - SUBJECTIVE AND OBJECTIVE BOX
Chief Complaint:  Patient is a 69y old  Female who presents with a chief complaint of abscess on Right flank (29 Jan 2020 12:06)      Interval Events:     Allergies:  No Known Allergies      Home Medications:    Hospital Medications:  acetaminophen   Tablet .. 650 milliGRAM(s) Oral every 6 hours  aluminum hydroxide/magnesium hydroxide/simethicone Suspension 30 milliLiter(s) Oral every 4 hours PRN  famotidine Injectable 20 milliGRAM(s) IV Push two times a day  labetalol 100 milliGRAM(s) Oral two times a day  lactobacillus acidophilus 1 Tablet(s) Oral daily  magnesium gluconate 500 milliGRAM(s) Oral every 8 hours  morphine  - Injectable 2 milliGRAM(s) IV Push every 4 hours PRN  nafcillin  IVPB 2 Gram(s) IV Intermittent every 4 hours  NIFEdipine XL 60 milliGRAM(s) Oral daily  ondansetron Injectable 8 milliGRAM(s) IV Push every 8 hours PRN  oxycodone    5 mG/acetaminophen 325 mG 2 Tablet(s) Oral every 6 hours PRN  potassium chloride    Tablet ER 40 milliEquivalent(s) Oral daily  rivaroxaban 20 milliGRAM(s) Oral with dinner  vancomycin    Solution 125 milliGRAM(s) Oral every 6 hours  vitamin A &amp; D Ointment 1 Application(s) Topical two times a day      PMHX/PSHX:  Gastroesophageal reflux disease  Obesity  Diabetes mellitus  HTN (hypertension)  History of cholecystectomy  H/O hernia repair      Family history:      ROS:   As mentioned below      PHYSICAL EXAM:   Vital Signs:  Vital Signs Last 24 Hrs  T(C): 36.8 (01 Feb 2020 05:46), Max: 36.8 (01 Feb 2020 05:46)  T(F): 98.3 (01 Feb 2020 05:46), Max: 98.3 (01 Feb 2020 05:46)  HR: 61 (01 Feb 2020 05:46) (57 - 63)  BP: 136/62 (01 Feb 2020 05:46) (136/62 - 149/67)  BP(mean): --  RR: 16 (01 Feb 2020 05:46) (16 - 16)  SpO2: --  Daily     Daily     GENERAL:  NAD  HEENT:  NC/AT,  No Thyromegaly  CHEST:  CTA B/L  HEART:  S1, S2- No M, R, G  ABDOMEN:  Soft, NT, ND  EXTEREMITIES:  no cyanosis,clubbing or edema  SKIN:  NAD  NEURO:  Grossly Nr.    LABS:                        12.1   11.49 )-----------( 247      ( 31 Jan 2020 06:31 )             38.2     01-31    139  |  103  |  9<L>  ----------------------------<  111<H>  3.4<L>   |  23  |  0.6<L>    Ca    9.7      31 Jan 2020 06:31  Mg     1.9     01-31                Imaging:

## 2020-02-02 PROCEDURE — 99231 SBSQ HOSP IP/OBS SF/LOW 25: CPT

## 2020-02-02 RX ADMIN — OXYCODONE AND ACETAMINOPHEN 2 TABLET(S): 5; 325 TABLET ORAL at 13:52

## 2020-02-02 RX ADMIN — Medication 500 MILLIGRAM(S): at 22:13

## 2020-02-02 RX ADMIN — Medication 1 APPLICATION(S): at 06:13

## 2020-02-02 RX ADMIN — Medication 40 MILLIEQUIVALENT(S): at 12:35

## 2020-02-02 RX ADMIN — NAFCILLIN 200 GRAM(S): 10 INJECTION, POWDER, FOR SOLUTION INTRAVENOUS at 18:10

## 2020-02-02 RX ADMIN — Medication 60 MILLIGRAM(S): at 06:09

## 2020-02-02 RX ADMIN — Medication 1 TABLET(S): at 12:35

## 2020-02-02 RX ADMIN — RIVAROXABAN 20 MILLIGRAM(S): KIT at 18:10

## 2020-02-02 RX ADMIN — Medication 125 MILLIGRAM(S): at 12:36

## 2020-02-02 RX ADMIN — FAMOTIDINE 20 MILLIGRAM(S): 10 INJECTION INTRAVENOUS at 06:09

## 2020-02-02 RX ADMIN — Medication 500 MILLIGRAM(S): at 13:52

## 2020-02-02 RX ADMIN — Medication 100 MILLIGRAM(S): at 18:10

## 2020-02-02 RX ADMIN — NAFCILLIN 200 GRAM(S): 10 INJECTION, POWDER, FOR SOLUTION INTRAVENOUS at 02:23

## 2020-02-02 RX ADMIN — Medication 500 MILLIGRAM(S): at 06:17

## 2020-02-02 RX ADMIN — NAFCILLIN 200 GRAM(S): 10 INJECTION, POWDER, FOR SOLUTION INTRAVENOUS at 22:13

## 2020-02-02 RX ADMIN — OXYCODONE AND ACETAMINOPHEN 2 TABLET(S): 5; 325 TABLET ORAL at 22:13

## 2020-02-02 RX ADMIN — Medication 125 MILLIGRAM(S): at 18:11

## 2020-02-02 RX ADMIN — NAFCILLIN 200 GRAM(S): 10 INJECTION, POWDER, FOR SOLUTION INTRAVENOUS at 13:53

## 2020-02-02 RX ADMIN — Medication 125 MILLIGRAM(S): at 06:18

## 2020-02-02 RX ADMIN — NAFCILLIN 200 GRAM(S): 10 INJECTION, POWDER, FOR SOLUTION INTRAVENOUS at 06:13

## 2020-02-02 RX ADMIN — NAFCILLIN 200 GRAM(S): 10 INJECTION, POWDER, FOR SOLUTION INTRAVENOUS at 12:00

## 2020-02-02 NOTE — PROGRESS NOTE ADULT - SUBJECTIVE AND OBJECTIVE BOX
DARCIEPATRICIA  69y, Female  Allergy: No Known Allergies    Hospital Day: 10d    Patient seen and examined earlier today. No acute events overnight.    PMH/PSH:  PAST MEDICAL & SURGICAL HISTORY:  Gastroesophageal reflux disease  Obesity  Diabetes mellitus  HTN (hypertension)  History of cholecystectomy  H/O hernia repair: 2011 and revised in 2013    VITALS:  T(F): 97.6 (02-01-20 @ 21:16), Max: 97.6 (02-01-20 @ 21:16)  HR: 60 (02-02-20 @ 06:32)  BP: 133/60 (02-02-20 @ 06:32) (127/60 - 136/63)  RR: 16 (02-02-20 @ 06:32)  SpO2: --    TESTS & MEASUREMENTS:  Weight (Kg):     01-31-20 @ 07:01  -  02-01-20 @ 07:00  --------------------------------------------------------  IN: 200 mL / OUT: 1400 mL / NET: -1200 mL    Culture - Other (collected 01-27-20 @ 16:50)  Source: Wound C&amp;S OF DEEP ABDOMINAL WOUND  Final Report (01-31-20 @ 12:37):    Rare Staphylococcus aureus  Organism: Staphylococcus aureus (01-31-20 @ 12:37)  Organism: Staphylococcus aureus (01-31-20 @ 12:37)      -  Ampicillin/Sulbactam: S <=8/4      -  Cefazolin: S <=4      -  Clindamycin: S 0.5      -  Erythromycin: S <=0.25      -  Gentamicin: S 2 Should not be used as monotherapy      -  Oxacillin: S <=0.25      -  RIF- Rifampin: S <=1 Should not be used as monotherapy      -  Tetra/Doxy: S <=1      -  Trimethoprim/Sulfamethoxazole: S <=0.5/9.5      -  Vancomycin: S 2      Method Type: NAGI    Culture - Other (collected 01-27-20 @ 16:32)  Source: Wound C&amp;S OF ABDOMINAL WOUND  Final Report (01-30-20 @ 11:09):    No growth at 48 hours    RECENT DIAGNOSTIC ORDERS:  Diet, Dysphagia 3 Soft-Thin Liquids:   Consistent Carbohydrate No Snacks  DASH/TLC Sodium & Cholesterol Restricted  Supplement Feeding Modality:  Oral  Prostat Cans or Servings Per Day:  1       Frequency:  Two Times a day (02-01-20 @ 18:12)    MEDICATIONS:  MEDICATIONS  (STANDING):  acetaminophen   Tablet .. 650 milliGRAM(s) Oral every 6 hours  famotidine Injectable 20 milliGRAM(s) IV Push two times a day  labetalol 100 milliGRAM(s) Oral two times a day  lactobacillus acidophilus 1 Tablet(s) Oral daily  magnesium gluconate 500 milliGRAM(s) Oral every 8 hours  nafcillin  IVPB 2 Gram(s) IV Intermittent every 4 hours  NIFEdipine XL 60 milliGRAM(s) Oral daily  potassium chloride    Tablet ER 40 milliEquivalent(s) Oral daily  rivaroxaban 20 milliGRAM(s) Oral with dinner  vancomycin    Solution 125 milliGRAM(s) Oral every 6 hours  vitamin A &amp; D Ointment 1 Application(s) Topical two times a day    MEDICATIONS  (PRN):  aluminum hydroxide/magnesium hydroxide/simethicone Suspension 30 milliLiter(s) Oral every 4 hours PRN Dyspepsia  morphine  - Injectable 2 milliGRAM(s) IV Push every 4 hours PRN Mild Pain (1 - 3)  ondansetron Injectable 8 milliGRAM(s) IV Push every 8 hours PRN Nausea and/or Vomiting  oxycodone    5 mG/acetaminophen 325 mG 2 Tablet(s) Oral every 6 hours PRN Moderate Pain (4 - 6)    HOME MEDICATIONS:  Acidophilus oral capsule (01-31)  Keflex 500 mg oral capsule (01-31)  Mag-G 500 mg oral tablet (01-23)  NIFEdipine 30 mg oral tablet, extended release (01-23)  oxycodone-acetaminophen 5 mg-325 mg oral tablet (01-23)  potassium chloride 10 mEq oral capsule, extended release (01-23)  rivaroxaban 20 mg oral tablet (01-23)  Santyl (01-23)  vancomycin (01-31)  vitamin A &amp; D topical ointment (01-31)    REVIEW OF SYSTEMS:  All other review of systems is negative unless indicated above.     PHYSICAL EXAM:  GENERAL: NAD  HEENT: No Swelling  CHEST/LUNG: Good air entry, No wheezing  HEART: RRR, No murmurs  ABDOMEN: Soft, Bowel sounds present  EXTREMITIES:  No clubbing

## 2020-02-02 NOTE — PROGRESS NOTE ADULT - ASSESSMENT
70yo female, currently a resident at a NH and PMH includes pulmonary embolism (on DOAC), Afib,  HTN, DM presents to the ER for "wound check" as NH reports the wound has cellulitis extending to the right mid and lower   abdominal wall along with drainage. Patient first had hernia repair to area in 2011 and subsequently revised in 2013. Son told ER wound has not healed correctly since 2013 procedure (Patient mentions that she has had     4 surgeries to that area). Patient was  on vancomycin and meropenem at her facility.     ASSESSMENT:  Principal Diagnosis:  cellulitis and abscess of right flank wound  Active C diff colitis  Hypokalemia & Hypomagnesemia     Associated Active Comorbid Conditions:  Morbid obesity. BMI 40  Diabetes mellitis type 2   Hypertension   chronic aifb   history of pulmonary embolism     PLAN:      - s/p I&D by surgery   - continue  Nafcillin and transition to oral Keflex upon DC until Feb10th  - continue oral vanco until Feb 24th   - local wound care s/p I&D as per surgery  - Wound cx MSSA  - tolerating diet   - hypokalemia / hypomagnesemia - improved s/p supplementation   -Hypertension -  procardia, Labetalol 100 milligrams twice daily   -Insulin sliding scale with coverage with meals and QHS   - Morbid obesity-weight & dietary management   - Vaseline to lips  -DVT Prophylaxis:  Xarelto    Discharge Disposition: may DC to NH  when bed available  DC note and med rec completed 68yo female, currently a resident at a NH and PMH includes pulmonary embolism (on DOAC), Afib,  HTN, DM presents to the ER for "wound check" as NH reports the wound has cellulitis extending to the right mid and lower   abdominal wall along with drainage. Patient first had hernia repair to area in 2011 and subsequently revised in 2013. Son told ER wound has not healed correctly since 2013 procedure (Patient mentions that she has had     4 surgeries to that area). Patient was  on vancomycin and meropenem at her facility.     ASSESSMENT:  Principal Diagnosis:  cellulitis and abscess of right flank wound  Active C diff colitis  Hypokalemia & Hypomagnesemia     Associated Active Comorbid Conditions:  Morbid obesity. BMI 40  Diabetes mellitis type 2   Hypertension   chronic aifb   history of pulmonary embolism     PLAN:      - s/p I&D by surgery   - continue  Nafcillin and transition to oral Keflex upon DC until Feb10th  - continue oral vanco until Feb 24th   - local wound care s/p I&D as per surgery  - Wound cx MSSA  - tolerating diet   - hypokalemia / hypomagnesemia - improved s/p supplementation   -Hypertension -  procardia, Labetalol 100 milligrams twice daily   -Insulin sliding scale with coverage with meals and QHS   - Morbid obesity-weight & dietary management   - Vaseline to lips  -DVT Prophylaxis:  Xarelto    #Progress Note Handoff  Pending (specify): Placement  Family discussion: d/w pt regarding awaiting auth  Disposition: NH

## 2020-02-03 PROCEDURE — 99231 SBSQ HOSP IP/OBS SF/LOW 25: CPT

## 2020-02-03 RX ORDER — NYSTATIN CREAM 100000 [USP'U]/G
1 CREAM TOPICAL
Refills: 0 | Status: DISCONTINUED | OUTPATIENT
Start: 2020-02-03 | End: 2020-02-05

## 2020-02-03 RX ADMIN — NAFCILLIN 200 GRAM(S): 10 INJECTION, POWDER, FOR SOLUTION INTRAVENOUS at 06:41

## 2020-02-03 RX ADMIN — Medication 1 TABLET(S): at 13:07

## 2020-02-03 RX ADMIN — Medication 125 MILLIGRAM(S): at 18:17

## 2020-02-03 RX ADMIN — NAFCILLIN 200 GRAM(S): 10 INJECTION, POWDER, FOR SOLUTION INTRAVENOUS at 18:41

## 2020-02-03 RX ADMIN — Medication 100 MILLIGRAM(S): at 18:17

## 2020-02-03 RX ADMIN — OXYCODONE AND ACETAMINOPHEN 2 TABLET(S): 5; 325 TABLET ORAL at 16:21

## 2020-02-03 RX ADMIN — Medication 500 MILLIGRAM(S): at 06:41

## 2020-02-03 RX ADMIN — NAFCILLIN 200 GRAM(S): 10 INJECTION, POWDER, FOR SOLUTION INTRAVENOUS at 02:41

## 2020-02-03 RX ADMIN — Medication 125 MILLIGRAM(S): at 06:57

## 2020-02-03 RX ADMIN — NYSTATIN CREAM 1 APPLICATION(S): 100000 CREAM TOPICAL at 18:16

## 2020-02-03 RX ADMIN — RIVAROXABAN 20 MILLIGRAM(S): KIT at 18:16

## 2020-02-03 RX ADMIN — Medication 500 MILLIGRAM(S): at 13:07

## 2020-02-03 RX ADMIN — Medication 125 MILLIGRAM(S): at 13:08

## 2020-02-03 RX ADMIN — NAFCILLIN 200 GRAM(S): 10 INJECTION, POWDER, FOR SOLUTION INTRAVENOUS at 14:42

## 2020-02-03 RX ADMIN — NAFCILLIN 200 GRAM(S): 10 INJECTION, POWDER, FOR SOLUTION INTRAVENOUS at 21:49

## 2020-02-03 RX ADMIN — Medication 125 MILLIGRAM(S): at 02:59

## 2020-02-03 RX ADMIN — FAMOTIDINE 20 MILLIGRAM(S): 10 INJECTION INTRAVENOUS at 18:41

## 2020-02-03 RX ADMIN — NAFCILLIN 200 GRAM(S): 10 INJECTION, POWDER, FOR SOLUTION INTRAVENOUS at 10:14

## 2020-02-03 RX ADMIN — FAMOTIDINE 20 MILLIGRAM(S): 10 INJECTION INTRAVENOUS at 06:56

## 2020-02-03 RX ADMIN — Medication 500 MILLIGRAM(S): at 21:49

## 2020-02-03 RX ADMIN — Medication 40 MILLIEQUIVALENT(S): at 13:07

## 2020-02-03 RX ADMIN — OXYCODONE AND ACETAMINOPHEN 2 TABLET(S): 5; 325 TABLET ORAL at 06:44

## 2020-02-03 RX ADMIN — Medication 100 MILLIGRAM(S): at 06:41

## 2020-02-03 RX ADMIN — Medication 60 MILLIGRAM(S): at 06:41

## 2020-02-03 NOTE — PROGRESS NOTE ADULT - SUBJECTIVE AND OBJECTIVE BOX
PATRICIA SPRAGUE  69y, Female  Allergy: No Known Allergies    Hospital Day: 11d    Patient seen and examined earlier today. No acute events overnight.    PMH/PSH:  PAST MEDICAL & SURGICAL HISTORY:  Gastroesophageal reflux disease  Obesity  Diabetes mellitus  HTN (hypertension)  History of cholecystectomy  H/O hernia repair: 2011 and revised in 2013      VITALS:  T(F): 98.7 (02-02-20 @ 21:11), Max: 98.7 (02-02-20 @ 21:11)  HR: 60 (02-02-20 @ 21:11)  BP: 119/56 (02-02-20 @ 21:11) (119/56 - 123/60)  RR: 16 (02-02-20 @ 21:11)  SpO2: --    TESTS & MEASUREMENTS:  Weight (Kg):       02-03-20 @ 07:01  -  02-03-20 @ 10:05  --------------------------------------------------------  IN: 0 mL / OUT: 900 mL / NET: -900 mL    Culture - Other (collected 01-27-20 @ 16:50)  Source: Wound C&amp;S OF DEEP ABDOMINAL WOUND  Final Report (01-31-20 @ 12:37):    Rare Staphylococcus aureus  Organism: Staphylococcus aureus (01-31-20 @ 12:37)  Organism: Staphylococcus aureus (01-31-20 @ 12:37)      -  Ampicillin/Sulbactam: S <=8/4      -  Cefazolin: S <=4      -  Clindamycin: S 0.5      -  Erythromycin: S <=0.25      -  Gentamicin: S 2 Should not be used as monotherapy      -  Oxacillin: S <=0.25      -  RIF- Rifampin: S <=1 Should not be used as monotherapy      -  Tetra/Doxy: S <=1      -  Trimethoprim/Sulfamethoxazole: S <=0.5/9.5      -  Vancomycin: S 2      Method Type: NAGI    Culture - Other (collected 01-27-20 @ 16:32)  Source: Wound C&amp;S OF ABDOMINAL WOUND  Final Report (01-30-20 @ 11:09):    No growth at 48 hours    MEDICATIONS:  MEDICATIONS  (STANDING):  acetaminophen   Tablet .. 650 milliGRAM(s) Oral every 6 hours  famotidine Injectable 20 milliGRAM(s) IV Push two times a day  labetalol 100 milliGRAM(s) Oral two times a day  lactobacillus acidophilus 1 Tablet(s) Oral daily  magnesium gluconate 500 milliGRAM(s) Oral every 8 hours  nafcillin  IVPB 2 Gram(s) IV Intermittent every 4 hours  NIFEdipine XL 60 milliGRAM(s) Oral daily  potassium chloride    Tablet ER 40 milliEquivalent(s) Oral daily  rivaroxaban 20 milliGRAM(s) Oral with dinner  vancomycin    Solution 125 milliGRAM(s) Oral every 6 hours  vitamin A &amp; D Ointment 1 Application(s) Topical two times a day    MEDICATIONS  (PRN):  aluminum hydroxide/magnesium hydroxide/simethicone Suspension 30 milliLiter(s) Oral every 4 hours PRN Dyspepsia  morphine  - Injectable 2 milliGRAM(s) IV Push every 4 hours PRN Mild Pain (1 - 3)  ondansetron Injectable 8 milliGRAM(s) IV Push every 8 hours PRN Nausea and/or Vomiting  oxycodone    5 mG/acetaminophen 325 mG 2 Tablet(s) Oral every 6 hours PRN Moderate Pain (4 - 6)    HOME MEDICATIONS:  Acidophilus oral capsule (01-31)  Keflex 500 mg oral capsule (01-31)  Mag-G 500 mg oral tablet (01-23)  NIFEdipine 30 mg oral tablet, extended release (01-23)  oxycodone-acetaminophen 5 mg-325 mg oral tablet (01-23)  potassium chloride 10 mEq oral capsule, extended release (01-23)  rivaroxaban 20 mg oral tablet (01-23)  Santyl (01-23)  vancomycin (01-31)  vitamin A &amp; D topical ointment (01-31)    REVIEW OF SYSTEMS:  All other review of systems is negative unless indicated above.     PHYSICAL EXAM:  GENERAL: NAD  HEENT: No Swelling  CHEST/LUNG: Good air entry, No wheezing  HEART: RRR, No murmurs  ABDOMEN: Soft, Bowel sounds present  EXTREMITIES:  No clubbing

## 2020-02-03 NOTE — PROGRESS NOTE ADULT - ASSESSMENT
68yo female, currently a resident at a NH and PMH includes pulmonary embolism (on DOAC), Afib,  HTN, DM presents to the ER for "wound check" as NH reports the wound has cellulitis extending to the right mid and lower   abdominal wall along with drainage. Patient first had hernia repair to area in 2011 and subsequently revised in 2013. Son told ER wound has not healed correctly since 2013 procedure (Patient mentions that she has had     4 surgeries to that area). Patient was  on vancomycin and meropenem at her facility.     ASSESSMENT:  Principal Diagnosis:  cellulitis and abscess of right flank wound  Active C diff colitis  Hypokalemia & Hypomagnesemia     Associated Active Comorbid Conditions:  Morbid obesity. BMI 40  Diabetes mellitis type 2   Hypertension   chronic aifb   history of pulmonary embolism     PLAN:      - s/p I&D by surgery   - continue  Nafcillin and transition to oral Keflex upon DC until Feb10th  - continue oral vanco until Feb 24th   - local wound care s/p I&D as per surgery  - Wound cx MSSA  - tolerating diet   - hypokalemia / hypomagnesemia - improved s/p supplementation   -Hypertension -  procardia, Labetalol 100 milligrams twice daily   -Insulin sliding scale with coverage with meals and QHS   - Morbid obesity-weight & dietary management   - Vaseline to lips  -DVT Prophylaxis:  Xarelto    #Progress Note Handoff  Pending (specify): Placement  Family discussion: d/w pt regarding awaiting auth  Disposition: NH

## 2020-02-04 PROCEDURE — 99231 SBSQ HOSP IP/OBS SF/LOW 25: CPT

## 2020-02-04 RX ORDER — OXYCODONE AND ACETAMINOPHEN 5; 325 MG/1; MG/1
2 TABLET ORAL EVERY 6 HOURS
Refills: 0 | Status: DISCONTINUED | OUTPATIENT
Start: 2020-02-04 | End: 2020-02-05

## 2020-02-04 RX ADMIN — OXYCODONE AND ACETAMINOPHEN 2 TABLET(S): 5; 325 TABLET ORAL at 01:44

## 2020-02-04 RX ADMIN — Medication 100 MILLIGRAM(S): at 06:32

## 2020-02-04 RX ADMIN — Medication 125 MILLIGRAM(S): at 06:32

## 2020-02-04 RX ADMIN — RIVAROXABAN 20 MILLIGRAM(S): KIT at 17:01

## 2020-02-04 RX ADMIN — NAFCILLIN 200 GRAM(S): 10 INJECTION, POWDER, FOR SOLUTION INTRAVENOUS at 17:02

## 2020-02-04 RX ADMIN — Medication 100 MILLIGRAM(S): at 17:01

## 2020-02-04 RX ADMIN — NAFCILLIN 200 GRAM(S): 10 INJECTION, POWDER, FOR SOLUTION INTRAVENOUS at 21:08

## 2020-02-04 RX ADMIN — NAFCILLIN 200 GRAM(S): 10 INJECTION, POWDER, FOR SOLUTION INTRAVENOUS at 14:32

## 2020-02-04 RX ADMIN — Medication 125 MILLIGRAM(S): at 17:02

## 2020-02-04 RX ADMIN — OXYCODONE AND ACETAMINOPHEN 2 TABLET(S): 5; 325 TABLET ORAL at 13:45

## 2020-02-04 RX ADMIN — Medication 125 MILLIGRAM(S): at 00:05

## 2020-02-04 RX ADMIN — Medication 40 MILLIEQUIVALENT(S): at 12:34

## 2020-02-04 RX ADMIN — NYSTATIN CREAM 1 APPLICATION(S): 100000 CREAM TOPICAL at 17:01

## 2020-02-04 RX ADMIN — Medication 500 MILLIGRAM(S): at 21:07

## 2020-02-04 RX ADMIN — OXYCODONE AND ACETAMINOPHEN 2 TABLET(S): 5; 325 TABLET ORAL at 03:08

## 2020-02-04 RX ADMIN — Medication 1 TABLET(S): at 12:34

## 2020-02-04 RX ADMIN — NAFCILLIN 200 GRAM(S): 10 INJECTION, POWDER, FOR SOLUTION INTRAVENOUS at 02:57

## 2020-02-04 RX ADMIN — Medication 60 MILLIGRAM(S): at 06:32

## 2020-02-04 RX ADMIN — Medication 500 MILLIGRAM(S): at 06:32

## 2020-02-04 RX ADMIN — Medication 125 MILLIGRAM(S): at 12:35

## 2020-02-04 RX ADMIN — FAMOTIDINE 20 MILLIGRAM(S): 10 INJECTION INTRAVENOUS at 05:29

## 2020-02-04 RX ADMIN — NAFCILLIN 200 GRAM(S): 10 INJECTION, POWDER, FOR SOLUTION INTRAVENOUS at 10:32

## 2020-02-04 RX ADMIN — Medication 500 MILLIGRAM(S): at 13:45

## 2020-02-04 RX ADMIN — OXYCODONE AND ACETAMINOPHEN 2 TABLET(S): 5; 325 TABLET ORAL at 20:54

## 2020-02-04 RX ADMIN — FAMOTIDINE 20 MILLIGRAM(S): 10 INJECTION INTRAVENOUS at 17:01

## 2020-02-04 RX ADMIN — NAFCILLIN 200 GRAM(S): 10 INJECTION, POWDER, FOR SOLUTION INTRAVENOUS at 06:32

## 2020-02-04 RX ADMIN — NYSTATIN CREAM 1 APPLICATION(S): 100000 CREAM TOPICAL at 06:32

## 2020-02-04 NOTE — CHART NOTE - NSCHARTNOTEFT_GEN_A_CORE
Registered Dietitian Follow-Up     Patient Profile Reviewed                           Yes [x]   No []     Nutrition History Previously Obtained        Yes []  No [x]       Pertinent Medical Interventions: p/w cellulitis and abscess of right flank wound s/p I&D by surgery. Active c.diff colitis noted.     Diet order: Dysphagia 3 Mechanical Soft diet with thin liquids + DASH/TLC diet modifier.     Anthropometrics:  - Ht. 165.1 cm.  - Wt. 106.6 kg (1/27) - no new wt at this time  - BMI 39.1  - IBW 56.8 kg.     Pertinent Lab Data: no new lab data at this time     Pertinent Meds: KCl (1/31, not receiving at this time per RN), acidophilus, maalox, famotidine, Mg gluconate, labetatol, zofran     Physical Findings:  - Appearance: sleeping at time of RD visit  - GI function: last BM 2/4 (loose)  - Tubes: no feeding tubes  - Oral/Mouth cavity: no chewing/swallowing difficulty reported at this time  - Skin: R abd abscess noted     Nutrition Requirements  Weight Used: 56.8 kg IBW d/t BMI >30     Estimated Energy Needs    Continue []  Adjust [x]  6109-4469 kcal/day (25-30 kcal/kg IBW)        Estimated Protein Needs    Continue []  Adjust [x]  68-80 g/day (1.2-1.4 g/kg IBW)        Estimated Fluid Needs        Continue []  Adjust [x]  1 mL/kcal     Nutrient Intake: Po intake remains poor per staff, consuming 25-50% of meals at this time.        [x] Previous Nutrition Diagnosis: Inadequate energy intake            [x] Ongoing          [] Resolved    Nutrition Intervention: Meals & Snacks, Medical Food Supplements, Coordination of Care     Goal/Expected Outcome: Pt to demonstrate tolerance to diet order, with at least 75% po intake achieved over next 3 days.     Indicator/Monitoring: Energy intake, glucose profile, renal profile, diet order, nutrition focused physical findings, body composition.    Recommendation: Check Mg, PO4 & K+. Check HgbA1C. Monitor blood glucose levels. Order Glucerna q12hrs. Consider consulting SLP services to evaluate need for diet texture/consistency change in pt receiving dysphagia 3 Mechanical soft diet with poor po intake at this time. Diet order per SLP. Reviewed with LIP on unit.

## 2020-02-04 NOTE — PROGRESS NOTE ADULT - SUBJECTIVE AND OBJECTIVE BOX
PATRICIA SPRAGUE  69y, Female  Allergy: No Known Allergies    Hospital Day: 12d    Patient seen and examined earlier today. No acute events overnight.    PMH/PSH:  PAST MEDICAL & SURGICAL HISTORY:  Gastroesophageal reflux disease  Obesity  Diabetes mellitus  HTN (hypertension)  History of cholecystectomy  H/O hernia repair: 2011 and revised in 2013    VITALS:  T(F): 98.2 (02-04-20 @ 06:19), Max: 98.2 (02-04-20 @ 06:19)  HR: 59 (02-04-20 @ 06:19)  BP: 138/65 (02-04-20 @ 06:19) (119/56 - 147/67)  RR: 16 (02-04-20 @ 06:19)  SpO2: --    TESTS & MEASUREMENTS:  Weight (Kg):       02-02-20 @ 07:01  -  02-03-20 @ 07:00  --------------------------------------------------------  IN: 0 mL / OUT: 400 mL / NET: -400 mL    02-03-20 @ 07:01  -  02-04-20 @ 07:00  --------------------------------------------------------  IN: 0 mL / OUT: 1800 mL / NET: -1800 mL    MEDICATIONS:  MEDICATIONS  (STANDING):  acetaminophen   Tablet .. 650 milliGRAM(s) Oral every 6 hours  famotidine Injectable 20 milliGRAM(s) IV Push two times a day  labetalol 100 milliGRAM(s) Oral two times a day  lactobacillus acidophilus 1 Tablet(s) Oral daily  magnesium gluconate 500 milliGRAM(s) Oral every 8 hours  nafcillin  IVPB 2 Gram(s) IV Intermittent every 4 hours  NIFEdipine XL 60 milliGRAM(s) Oral daily  nystatin Powder 1 Application(s) Topical two times a day  potassium chloride    Tablet ER 40 milliEquivalent(s) Oral daily  rivaroxaban 20 milliGRAM(s) Oral with dinner  vancomycin    Solution 125 milliGRAM(s) Oral every 6 hours  vitamin A &amp; D Ointment 1 Application(s) Topical two times a day    MEDICATIONS  (PRN):  aluminum hydroxide/magnesium hydroxide/simethicone Suspension 30 milliLiter(s) Oral every 4 hours PRN Dyspepsia  ondansetron Injectable 8 milliGRAM(s) IV Push every 8 hours PRN Nausea and/or Vomiting  oxycodone    5 mG/acetaminophen 325 mG 2 Tablet(s) Oral every 6 hours PRN Moderate Pain (4 - 6)    HOME MEDICATIONS:  Acidophilus oral capsule (01-31)  Keflex 500 mg oral capsule (01-31)  Mag-G 500 mg oral tablet (01-23)  NIFEdipine 30 mg oral tablet, extended release (01-23)  oxycodone-acetaminophen 5 mg-325 mg oral tablet (01-23)  potassium chloride 10 mEq oral capsule, extended release (01-23)  rivaroxaban 20 mg oral tablet (01-23)  Santyl (01-23)  vancomycin (01-31)  vitamin A &amp; D topical ointment (01-31)    REVIEW OF SYSTEMS:  All other review of systems is negative unless indicated above.     PHYSICAL EXAM:  GENERAL: NAD  HEENT: No Swelling  CHEST/LUNG: Good air entry, No wheezing  HEART: RRR, No murmurs  ABDOMEN: Soft, Bowel sounds present  EXTREMITIES:  No clubbing

## 2020-02-05 ENCOUNTER — TRANSCRIPTION ENCOUNTER (OUTPATIENT)
Age: 70
End: 2020-02-05

## 2020-02-05 VITALS
DIASTOLIC BLOOD PRESSURE: 64 MMHG | SYSTOLIC BLOOD PRESSURE: 139 MMHG | RESPIRATION RATE: 16 BRPM | TEMPERATURE: 98 F | HEART RATE: 65 BPM

## 2020-02-05 PROCEDURE — 99239 HOSP IP/OBS DSCHRG MGMT >30: CPT

## 2020-02-05 RX ORDER — NIFEDIPINE 30 MG
1 TABLET, EXTENDED RELEASE 24 HR ORAL
Qty: 0 | Refills: 0 | DISCHARGE
Start: 2020-02-05

## 2020-02-05 RX ORDER — LABETALOL HCL 100 MG
1 TABLET ORAL
Qty: 0 | Refills: 0 | DISCHARGE
Start: 2020-02-05

## 2020-02-05 RX ADMIN — Medication 125 MILLIGRAM(S): at 12:06

## 2020-02-05 RX ADMIN — Medication 1 TABLET(S): at 12:05

## 2020-02-05 RX ADMIN — Medication 100 MILLIGRAM(S): at 06:14

## 2020-02-05 RX ADMIN — Medication 125 MILLIGRAM(S): at 00:30

## 2020-02-05 RX ADMIN — OXYCODONE AND ACETAMINOPHEN 2 TABLET(S): 5; 325 TABLET ORAL at 09:27

## 2020-02-05 RX ADMIN — NAFCILLIN 200 GRAM(S): 10 INJECTION, POWDER, FOR SOLUTION INTRAVENOUS at 02:05

## 2020-02-05 RX ADMIN — NAFCILLIN 200 GRAM(S): 10 INJECTION, POWDER, FOR SOLUTION INTRAVENOUS at 10:30

## 2020-02-05 RX ADMIN — NAFCILLIN 200 GRAM(S): 10 INJECTION, POWDER, FOR SOLUTION INTRAVENOUS at 06:14

## 2020-02-05 RX ADMIN — Medication 60 MILLIGRAM(S): at 06:14

## 2020-02-05 RX ADMIN — Medication 40 MILLIEQUIVALENT(S): at 12:03

## 2020-02-05 RX ADMIN — Medication 125 MILLIGRAM(S): at 06:15

## 2020-02-05 RX ADMIN — NYSTATIN CREAM 1 APPLICATION(S): 100000 CREAM TOPICAL at 05:23

## 2020-02-05 RX ADMIN — Medication 500 MILLIGRAM(S): at 06:14

## 2020-02-05 RX ADMIN — FAMOTIDINE 20 MILLIGRAM(S): 10 INJECTION INTRAVENOUS at 06:15

## 2020-02-05 NOTE — PROGRESS NOTE ADULT - SUBJECTIVE AND OBJECTIVE BOX
INTERVAL HPI/OVERNIGHT EVENTS:    SUBJECTIVE: Patient seen and examined at bedside.     no cp, sob, abd pain, fever  no abd pain, nausea, vomiting, melena  OBJECTIVE:    VITAL SIGNS:  Vital Signs Last 24 Hrs  T(C): 36.4 (05 Feb 2020 06:06), Max: 36.6 (04 Feb 2020 14:02)  T(F): 97.5 (05 Feb 2020 06:06), Max: 97.9 (04 Feb 2020 14:02)  HR: 65 (05 Feb 2020 06:06) (56 - 65)  BP: 139/64 (05 Feb 2020 06:06) (133/62 - 147/67)  BP(mean): --  RR: 16 (05 Feb 2020 06:06) (16 - 16)  SpO2: --      PHYSICAL EXAM:    General: NAD  HEENT: NC/AT; PERRL, clear conjunctiva  Neck: supple  Respiratory: CTA b/l  Cardiovascular: +S1/S2; RRR  Abdomen: soft, NT/ND; +BS x4  Extremities: WWP, 2+ peripheral pulses b/l; no LE edema  Skin: normal color and turgor; no rash  Neurological:    MEDICATIONS:  MEDICATIONS  (STANDING):  acetaminophen   Tablet .. 650 milliGRAM(s) Oral every 6 hours  famotidine Injectable 20 milliGRAM(s) IV Push two times a day  labetalol 100 milliGRAM(s) Oral two times a day  lactobacillus acidophilus 1 Tablet(s) Oral daily  magnesium gluconate 500 milliGRAM(s) Oral every 8 hours  nafcillin  IVPB 2 Gram(s) IV Intermittent every 4 hours  NIFEdipine XL 60 milliGRAM(s) Oral daily  nystatin Powder 1 Application(s) Topical two times a day  potassium chloride    Tablet ER 40 milliEquivalent(s) Oral daily  rivaroxaban 20 milliGRAM(s) Oral with dinner  vancomycin    Solution 125 milliGRAM(s) Oral every 6 hours  vitamin A &amp; D Ointment 1 Application(s) Topical two times a day    MEDICATIONS  (PRN):  aluminum hydroxide/magnesium hydroxide/simethicone Suspension 30 milliLiter(s) Oral every 4 hours PRN Dyspepsia  ondansetron Injectable 8 milliGRAM(s) IV Push every 8 hours PRN Nausea and/or Vomiting  oxycodone    5 mG/acetaminophen 325 mG 2 Tablet(s) Oral every 6 hours PRN Moderate Pain (4 - 6)      ALLERGIES:  Allergies    No Known Allergies    Intolerances        LABS:              Creatinine Trend: 0.6<--, 0.6<--, 0.6<--, 0.5<--, 0.5<--, 0.5<--        hs Troponin:              CSF:                      EKG:   MICROBIOLOGY:    IMAGING:      Labs, imaging, EKG personally reviewed    RADIOLOGY & ADDITIONAL TESTS: Reviewed.

## 2020-02-05 NOTE — DISCHARGE NOTE NURSING/CASE MANAGEMENT/SOCIAL WORK - PATIENT PORTAL LINK FT
You can access the FollowMyHealth Patient Portal offered by [facility] by registering at the following website: [email or web address]. By joining [employer]’s FollowMyHealth portal, you will also be able to view your health information using other applications (apps) compatible with our system.

## 2020-02-05 NOTE — CHART NOTE - NSCHARTNOTEFT_GEN_A_CORE
Patient seen and examined at bedside, unsure if midline or PICC line in LUE.  Line removed at bedside.  Visually intact on inspection.  Pressure held x 5 minutes before securing dressing.

## 2020-02-05 NOTE — PROGRESS NOTE ADULT - ASSESSMENT
69F PMHx PE on xarelto, AFib, HTN, DM2, hernia repair 2011 with revision 2013, abd wall abscess s/p debridement 12/2019 here with R flank subcutaneous abscess, s/p I&D. C diff associated diarrhea.    #R flank subq abscess, s/p I&D  wcx mssa  nafcillin, to be d/c on keflex to end 2/10  wound care  stable for d/c today to snf  outpt sx f/u  #Cdiff associated diarrhea  po vanco 125 qid to end 2/24  outpt id f/u  #PE  xarelto  #AFib  xarelto  #HTN  labetalol 100 bid  nifedipine 60  #DM2  controlled off medications  #DVT ppx  xarelto

## 2020-02-10 DIAGNOSIS — Z79.84 LONG TERM (CURRENT) USE OF ORAL HYPOGLYCEMIC DRUGS: ICD-10-CM

## 2020-02-10 DIAGNOSIS — L03.311 CELLULITIS OF ABDOMINAL WALL: ICD-10-CM

## 2020-02-10 DIAGNOSIS — T81.44XA SEPSIS FOLLOWING A PROCEDURE, INITIAL ENCOUNTER: ICD-10-CM

## 2020-02-10 DIAGNOSIS — E11.9 TYPE 2 DIABETES MELLITUS WITHOUT COMPLICATIONS: ICD-10-CM

## 2020-02-10 DIAGNOSIS — I10 ESSENTIAL (PRIMARY) HYPERTENSION: ICD-10-CM

## 2020-02-10 DIAGNOSIS — Z86.19 PERSONAL HISTORY OF OTHER INFECTIOUS AND PARASITIC DISEASES: ICD-10-CM

## 2020-02-10 DIAGNOSIS — Z86.711 PERSONAL HISTORY OF PULMONARY EMBOLISM: ICD-10-CM

## 2020-02-10 DIAGNOSIS — A41.01 SEPSIS DUE TO METHICILLIN SUSCEPTIBLE STAPHYLOCOCCUS AUREUS: ICD-10-CM
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
## 2020-02-10 DIAGNOSIS — Z79.01 LONG TERM (CURRENT) USE OF ANTICOAGULANTS: ICD-10-CM

## 2020-02-10 DIAGNOSIS — I48.20 CHRONIC ATRIAL FIBRILLATION, UNSPECIFIED: ICD-10-CM

## 2020-02-10 DIAGNOSIS — L02.211 CUTANEOUS ABSCESS OF ABDOMINAL WALL: ICD-10-CM

## 2020-02-10 DIAGNOSIS — L92.3 FOREIGN BODY GRANULOMA OF THE SKIN AND SUBCUTANEOUS TISSUE: ICD-10-CM

## 2020-02-10 DIAGNOSIS — Z18.89 OTHER SPECIFIED RETAINED FOREIGN BODY FRAGMENTS: ICD-10-CM

## 2020-02-10 DIAGNOSIS — R53.81 OTHER MALAISE: ICD-10-CM

## 2020-02-10 DIAGNOSIS — K21.9 GASTRO-ESOPHAGEAL REFLUX DISEASE WITHOUT ESOPHAGITIS: ICD-10-CM

## 2020-02-10 DIAGNOSIS — Z79.2 LONG TERM (CURRENT) USE OF ANTIBIOTICS: ICD-10-CM

## 2020-02-10 DIAGNOSIS — Z90.49 ACQUIRED ABSENCE OF OTHER SPECIFIED PARTS OF DIGESTIVE TRACT: ICD-10-CM

## 2020-02-10 DIAGNOSIS — E66.01 MORBID (SEVERE) OBESITY DUE TO EXCESS CALORIES: ICD-10-CM
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
## 2020-02-10 DIAGNOSIS — A04.71 ENTEROCOLITIS DUE TO CLOSTRIDIUM DIFFICILE, RECURRENT: ICD-10-CM

## 2020-02-10 DIAGNOSIS — G47.33 OBSTRUCTIVE SLEEP APNEA (ADULT) (PEDIATRIC): ICD-10-CM

## 2020-02-10 DIAGNOSIS — T83.79XA: ICD-10-CM

## 2020-02-10 DIAGNOSIS — E83.42 HYPOMAGNESEMIA: ICD-10-CM

## 2020-02-10 DIAGNOSIS — Z87.891 PERSONAL HISTORY OF NICOTINE DEPENDENCE: ICD-10-CM

## 2020-02-10 DIAGNOSIS — T81.40XA INFECTION FOLLOWING A PROCEDURE, UNSPECIFIED, INITIAL ENCOUNTER: ICD-10-CM

## 2020-02-10 DIAGNOSIS — E87.6 HYPOKALEMIA: ICD-10-CM

## 2020-02-10 DIAGNOSIS — R19.5 OTHER FECAL ABNORMALITIES: ICD-10-CM

## 2020-02-10 DIAGNOSIS — T81.43XA INFECTION FOLLOWING A PROCEDURE, ORGAN AND SPACE SURGICAL SITE, INITIAL ENCOUNTER: ICD-10-CM

## 2020-10-29 NOTE — ED ADULT TRIAGE NOTE - DIRECT TO ROOM CARE INITIATED:
Carotid ultrasound results placed on AMIE Echavarria desk to review.  She will be calling patient with results.    25-Sep-2019 20:33

## 2020-11-05 NOTE — ASU PATIENT PROFILE, ADULT - PMH
Active asthma    Afib    Diabetes mellitus  pt denies and refuses fingerstick  Gastroesophageal reflux disease    Generalized OA    HTN (hypertension)    Obesity

## 2020-11-06 ENCOUNTER — OUTPATIENT (OUTPATIENT)
Dept: OUTPATIENT SERVICES | Facility: HOSPITAL | Age: 70
LOS: 1 days | Discharge: HOME | End: 2020-11-06

## 2020-11-06 VITALS — SYSTOLIC BLOOD PRESSURE: 156 MMHG | HEART RATE: 67 BPM | RESPIRATION RATE: 15 BRPM | DIASTOLIC BLOOD PRESSURE: 72 MMHG

## 2020-11-06 VITALS
RESPIRATION RATE: 17 BRPM | DIASTOLIC BLOOD PRESSURE: 75 MMHG | HEIGHT: 66 IN | HEART RATE: 66 BPM | SYSTOLIC BLOOD PRESSURE: 157 MMHG | TEMPERATURE: 98 F | WEIGHT: 201.06 LBS | OXYGEN SATURATION: 98 %

## 2020-11-06 DIAGNOSIS — Z90.49 ACQUIRED ABSENCE OF OTHER SPECIFIED PARTS OF DIGESTIVE TRACT: Chronic | ICD-10-CM

## 2020-11-06 DIAGNOSIS — Z98.890 OTHER SPECIFIED POSTPROCEDURAL STATES: Chronic | ICD-10-CM

## 2020-11-06 RX ORDER — CEPHALEXIN 500 MG
1 CAPSULE ORAL
Qty: 0 | Refills: 0 | DISCHARGE

## 2020-11-06 RX ORDER — VANCOMYCIN HCL 1 G
125 VIAL (EA) INTRAVENOUS
Qty: 0 | Refills: 0 | DISCHARGE

## 2020-11-06 RX ORDER — COLLAGENASE CLOSTRIDIUM HIST. 250 UNIT/G
0 OINTMENT (GRAM) TOPICAL
Qty: 0 | Refills: 0 | DISCHARGE

## 2020-11-06 RX ORDER — IBUPROFEN 200 MG
1 TABLET ORAL
Qty: 20 | Refills: 0
Start: 2020-11-06

## 2020-11-06 NOTE — ASU DISCHARGE PLAN (ADULT/PEDIATRIC) - ASU DC SPECIAL INSTRUCTIONSFT
DIET:    Resume normal diet  No alcoholic  beverages for 24 hours or if on prescribed narcotic pain medications.    MEDICATION:    Resume your preoperative oral medications.  Check with your physician before starting aspirin, Coumadin, or other blood thinners.  Prescriptions given to you - take as directed.      ACTIVITY:    Rest today and limit your activities for 24 hours.  Do not drive or operate machinery for 24 hours - if you received anesthesia.  When taking pain medication, be careful as you walk or climb stairs.  It is not advisable to drive while taking prescribed pain medication.    SPECIAL INSTRUCTIONS:    __x___ Elevate operative site above heart level or as directed.  ___x__ Apply ice to operative site as directed.  _____ Use  sling as directed.  __x___ Exercise fingers.    DRESSING CARE:    __x___ You may change the dressing 4 days. Keep wound covered with band-aids.  _____ Do not change the dressing until your doctor see you.  _____ You can loosen or rewrap the dressing.  _____  Keep dressing clean and dry.  _____ You may shower in _____ day(s) with the extremity covered by a plastic bag.  __x___ OK to wash hand , including showers, in __4___ day(s).    ADDITIONAL  INFORMATION:    Post operative visit should be scheduled for next week.  If you are not aware of visit please contact office.  If you have any questions or concerns call office at      Notify your doctor if you develop   Fever  Excessive Swelling  Chills   Drainage   Pain not controlled by medication  Persistent numbness in hand or fingers    If an Emergency arises call 911 and/or go to the Emergency Room

## 2020-11-10 DIAGNOSIS — I48.91 UNSPECIFIED ATRIAL FIBRILLATION: ICD-10-CM

## 2020-11-10 DIAGNOSIS — M19.90 UNSPECIFIED OSTEOARTHRITIS, UNSPECIFIED SITE: ICD-10-CM

## 2020-11-10 DIAGNOSIS — M65.4 RADIAL STYLOID TENOSYNOVITIS [DE QUERVAIN]: ICD-10-CM

## 2020-11-10 DIAGNOSIS — Z82.49 FAMILY HISTORY OF ISCHEMIC HEART DISEASE AND OTHER DISEASES OF THE CIRCULATORY SYSTEM: ICD-10-CM

## 2020-11-10 DIAGNOSIS — J45.909 UNSPECIFIED ASTHMA, UNCOMPLICATED: ICD-10-CM

## 2020-11-10 DIAGNOSIS — E11.9 TYPE 2 DIABETES MELLITUS WITHOUT COMPLICATIONS: ICD-10-CM

## 2020-11-10 DIAGNOSIS — E66.9 OBESITY, UNSPECIFIED: ICD-10-CM

## 2020-11-10 DIAGNOSIS — K21.9 GASTRO-ESOPHAGEAL REFLUX DISEASE WITHOUT ESOPHAGITIS: ICD-10-CM

## 2021-04-20 NOTE — H&P ADULT - NSHPOUTPATIENTPROVIDERS_GEN_ALL_CORE
-- DO NOT REPLY / DO NOT REPLY ALL --  -- Message is from the [facility]--    COVID-19 Universal Screening: N/A - Not about scheduling    General Patient Message      Reason for Call: CALLER STATES THAT SHE WILL BE TRAVELING THIS MONTH FOR 2-3 MONTHS OVERSEAS  AND STATES THAT SHE NEEDS THE RX FILLED WHILE SHE WILL BE AWAY.    STATES THAT SHE WILL HAVE THE BLOOD WORK DONE AFTER SHE COMES BACK FROM HER TRIP.     CALLER WOULD LIKE A CALL BACK IF SHE DOES NOT GET THE RX FILLED OR IF THE RX IS SENT TO HER PREFFERED PHARMACY.     PHARMACY:   [facility]/pharmacy #[id number] - [address]  [phone]      Caller Information       Type Contact Phone    04/20/2021 02:18 PM CDT Phone (Incoming) Cherie Owens (Self) [phone] (M)          Alternative phone number:     Turnaround time given to caller:   \"This message will be sent to [state Provider's name]. The clinical team will fulfill your request as soon as they review your message.\"     PCP - None

## 2021-08-17 PROBLEM — I48.91 UNSPECIFIED ATRIAL FIBRILLATION: Chronic | Status: ACTIVE | Noted: 2020-11-06

## 2021-08-17 PROBLEM — M15.9 POLYOSTEOARTHRITIS, UNSPECIFIED: Chronic | Status: ACTIVE | Noted: 2020-11-06

## 2021-08-17 PROBLEM — J45.909 UNSPECIFIED ASTHMA, UNCOMPLICATED: Chronic | Status: ACTIVE | Noted: 2020-11-06

## 2021-10-01 ENCOUNTER — OUTPATIENT (OUTPATIENT)
Dept: OUTPATIENT SERVICES | Facility: HOSPITAL | Age: 71
LOS: 1 days | Discharge: HOME | End: 2021-10-01
Payer: MEDICAID

## 2021-10-01 DIAGNOSIS — G89.18 OTHER ACUTE POSTPROCEDURAL PAIN: ICD-10-CM

## 2021-10-01 DIAGNOSIS — Z90.49 ACQUIRED ABSENCE OF OTHER SPECIFIED PARTS OF DIGESTIVE TRACT: Chronic | ICD-10-CM

## 2021-10-01 DIAGNOSIS — Z98.890 OTHER SPECIFIED POSTPROCEDURAL STATES: Chronic | ICD-10-CM

## 2021-10-01 PROCEDURE — 74176 CT ABD & PELVIS W/O CONTRAST: CPT | Mod: 26

## 2021-10-30 NOTE — PROGRESS NOTE ADULT - PROVIDER SPECIALTY LIST ADULT
Hospitalist Pt arrives with R upper dental pain that began today, pt thinks she may have had an abscess that drained

## 2022-04-11 NOTE — PRE-ANESTHESIA EVALUATION ADULT - NS MD HP INPLANTS MED DEV
"Chief Complaint  Anxiety (Refill on beta blocker ) and Insomnia (Refill on zolpidem )    Subjective          Kari Stephen presents to [facility]  History of Present Illness  Insomnia-patient is currently on Ambien 10 mg nightly as directed without any side effects and working well to control insomnia.    Anxiety-patient is currently on propranolol LA 80 mg daily as directed works well to help her with her anxiety without side effects.    Hyperlipidemia-patient is currently on Crestor 20 mg daily as directed without side effects.  Cholesterol was last checked in October 2021 with a total cholesterol of 141 and LDL of 72    Objective   Vital Signs:   /82   Pulse 55   Temp 97.6 °F (36.4 °C)   Ht 162.6 cm (64\")   Wt 62.1 kg (137 lb)   SpO2 99%   BMI 23.52 kg/m²     BMI is within normal parameters. No follow-up required.      Physical Exam  Vitals reviewed.   Constitutional:       General: She is not in acute distress.     Appearance: She is well-developed.   HENT:      Head: Normocephalic.   Cardiovascular:      Rate and Rhythm: Normal rate and regular rhythm.      Heart sounds: Normal heart sounds.   Pulmonary:      Effort: Pulmonary effort is normal.      Breath sounds: Normal breath sounds.   Neurological:      Mental Status: She is alert and oriented to person, place, and time.      Gait: Gait normal.   Psychiatric:         Behavior: Behavior normal.         Thought Content: Thought content normal.         Judgment: Judgment normal.        Result Review :   The following data was reviewed by: MICHELLE Stevens on 04/11/2022:  CMP    CMP 10/15/21   Glucose 81   BUN 12   Creatinine 0.86   eGFR Non  Am 78   eGFR African Am 90   Sodium 140   Potassium 5.2   Chloride 101   Calcium 10.1   Total Protein 7.1   Albumin 4.8   Globulin 2.3   Total Bilirubin 0.5   Alkaline Phosphatase 77   AST (SGOT) 21   ALT (SGPT) 21      Comments are available for some flowsheets but are " not being displayed.           CBC w/diff    CBC w/Diff 10/15/21   WBC 6.7   RBC 4.73   Hemoglobin 13.6   Hematocrit 41.9   MCV 89   MCH 28.8   MCHC 32.5   RDW 12.6   Platelets 261   Neutrophil Rel % 51   Lymphocyte Rel % 37   Monocyte Rel % 8   Eosinophil Rel % 3   Basophil Rel % 1           Lipid Panel    Lipid Panel 10/15/21   Total Cholesterol 141   Triglycerides 103   HDL Cholesterol 50   VLDL Cholesterol 19   LDL Cholesterol  72   LDL/HDL Ratio 1.4      Comments are available for some flowsheets but are not being displayed.                             Assessment and Plan    Diagnoses and all orders for this visit:    1. Insomnia, unspecified type  -     zolpidem (AMBIEN) 10 MG tablet; Take 1 tablet by mouth At Night As Needed for Sleep.  Dispense: 90 tablet; Refill: 0    2. Essential hypertension  -     propranolol LA (INDERAL LA) 80 MG 24 hr capsule; Take 1 capsule by mouth Daily.  Dispense: 90 capsule; Refill: 3        Follow Up   Return in about 3 months (around 7/11/2022) for Recheck.  Patient was given instructions and counseling regarding her condition or for health maintenance advice. Please see specific information pulled into the AVS if appropriate.     Cont same  rto in 6 mons     Mask and googles worn     None

## 2022-05-25 NOTE — PROGRESS NOTE ADULT - SUBJECTIVE AND OBJECTIVE BOX
Anesthesia Start and Stop Event Times     Date Time Event    5/25/2022 1302 Ready for Procedure     1309 Anesthesia Start     1343 Anesthesia Stop        Responsible Staff  05/25/22    Name Role Begin End    Wolfgang Mahajan M.D. Anesth 1309 1343        Overtime Reason:  no overtime (within assigned shift)    Comments:                                                       PATRICIA SPRAGUE  69y  Female    Patient is a 69y old  Female who presents with a chief complaint of unable to care for self, inability to ambulate (18 Dec 2019 10:40)      INTERVAL HPI/OVERNIGHT EVENTS:  No interval events.  Patient has no new complaints. No abdominal pain.  Diarrhea resolvin loose BM at 4:00 AM today.  Scheduled for Debridement with surgery in the AM.      REVIEW OF SYSTEMS:  At least 10 systems were reviewed in ROS.   All systems reviewed are within normal limits except for that listed above.      VITALS:  T(F): 96.5 (19 @ 05:00), Max: 97.6 (19 @ 20:46)  HR: 59 (19 @ 05:00) (59 - 67)  BP: 146/75 (19 @ 05:00) (146/75 - 176/94)  RR: 18 (19 @ 05:00) (18 - 18)  SpO2: --      PHYSICAL EXAM:  GENERAL: NAD, well-developed  HEAD:  Atraumatic, Normocephalic  EYES: conjunctiva and sclera clear  ENMT: Moist mucous membranes  NECK: Supple, Normal thyroid  NERVOUS SYSTEM:  Alert & Oriented X3, Motor Strength 5/5 B/L upper and lower extremities.   CHEST/LUNG: Clear to auscultation bilaterally; No rales, rhonchi, wheezing, or rubs  HEART: Regular rate and rhythm; No murmurs, rubs, or gallops  ABDOMEN: Soft, Nontender, Nondistended; Bowel sounds present  EXTREMITIES:  2+ Peripheral Pulses, No clubbing, cyanosis, or edema  LYMPH: No lymphadenopathy noted  SKIN: abdominal wall sinus tracts.      Consultant(s) Notes Reviewed:  [x ] YES  [ ] NO  Care Discussed with Consultants/Other Providers [ x] YES  [ ] NO    LABS:                        14.1   12.03 )-----------( 233      ( 17 Dec 2019 08:45 )             42.9     -    140  |  102  |  16  ----------------------------<  100<H>  3.6   |  26  |  0.8    Ca    10.6<H>      18 Dec 2019 08:21  Mg     1.5         TPro  6.4  /  Alb  3.7  /  TBili  1.4<H>  /  DBili  x   /  AST  50<H>  /  ALT  40  /  AlkPhos  142<H>  12-18      MICROBIOLOGY:  Culture - Blood (19 @ 01:10)    Specimen Source: .Blood Blood-Peripheral    Culture Results:   No growth at 5 days.      Culture - Blood (19 @ 01:10)    Specimen Source: .Blood Blood-Peripheral    Culture Results:   No growth at 5 days.      C Diff by PCR Result: Positive (19 @ 09:23)      RADIOLOGY & ADDITIONAL TESTS:  X-ray Chest 1 View- PORTABLE-Urgent (19 @ 02:17)  Cardiomegaly. Bibasilar focal atelectasis.      CT Head No Cont (19 @ 00:48)   No CT evidence of acute intracranial pathology.      Imaging Personally Reviewed:  [x] YES  [ ] NO    MEDICATIONS  (STANDING):  ceFAZolin   IVPB 1000 milliGRAM(s) IV Intermittent every 8 hours  chlorhexidine 4% Liquid 1 Application(s) Topical <User Schedule>  hydrochlorothiazide 12.5 milliGRAM(s) Oral daily  lactobacillus acidophilus 1 Tablet(s) Oral three times a day with meals  NIFEdipine XL 30 milliGRAM(s) Oral daily  nystatin Powder 1 Application(s) Topical two times a day  rivaroxaban 20 milliGRAM(s) Oral with dinner  vancomycin    Solution 250 milliGRAM(s) Oral every 6 hours      MEDICATIONS  (PRN): None        Home Medications:  hydroCHLOROthiazide 12.5 mg oral capsule: 1 cap(s) orally once a day (06 Dec 2019 22:29)  NIFEdipine 30 mg oral tablet, extended release: 1 tab(s) orally once a day (06 Dec 2019 22:29)  oxycodone-acetaminophen 5 mg-325 mg oral tablet: 1 tab(s) orally every 8 hours, As Needed - 6) for severe pain  (06 Dec 2019 22:29)  pantoprazole 40 mg oral delayed release tablet: 1 tab(s) orally once a day (before a meal) (06 Dec 2019 22:29)  rivaroxaban 20 mg oral tablet: 1 tab(s) orally once a day (before a meal) (06 Dec 2019 22:29)        HEALTH ISSUES - PROBLEM Dx:  C diff colitis  Abdominal wall sinus tracts  Gastroesophageal reflux disease  Obesity  Diabetes mellitus  HTN (hypertension)  History of cholecystectomy  H/O hernia repair:  and revised in 2013

## 2022-11-15 NOTE — BRIEF OPERATIVE NOTE - TYPE OF ANESTHESIA
He was told his shot is in April Clyde's office but he should call to arrange a time to come pick it up vs just showing up   General

## 2022-11-30 NOTE — PROGRESS NOTE ADULT - SUBJECTIVE AND OBJECTIVE BOX
Crisis Note: Spoke with [address] [facility], [facility], [phone], clinicals has been faxed to be reviewed for admission. S: No significant change in patient - she is c-dif positive on PO vanco. No c/o abdominal pain  O'; Vital Signs Last 24 Hrs  T(C): 36.9 (08 Dec 2019 14:06), Max: 36.9 (08 Dec 2019 08:34)  T(F): 98.5 (08 Dec 2019 14:06), Max: 98.5 (08 Dec 2019 14:06)  HR: 59 (08 Dec 2019 14:06) (57 - 62)  BP: 143/67 (08 Dec 2019 14:06) (114/65 - 149/95)  BP(mean): --  RR: 16 (08 Dec 2019 14:06) (16 - 18)  SpO2: --    MEDICATIONS  (STANDING):  ceFAZolin   IVPB 1000 milliGRAM(s) IV Intermittent every 8 hours  ceFAZolin   IVPB      chlorhexidine 4% Liquid 1 Application(s) Topical <User Schedule>  hydrochlorothiazide 12.5 milliGRAM(s) Oral daily  magnesium sulfate  IVPB 2 Gram(s) IV Intermittent once  NIFEdipine XL 30 milliGRAM(s) Oral daily  pantoprazole    Tablet 40 milliGRAM(s) Oral before breakfast  potassium chloride    Tablet ER 40 milliEquivalent(s) Oral every 4 hours  rivaroxaban 20 milliGRAM(s) Oral with dinner  vancomycin    Solution 125 milliGRAM(s) Oral every 6 hours    MEDICATIONS  (PRN):  oxycodone    5 mG/acetaminophen 325 mG 1 Tablet(s) Oral every 8 hours PRN Severe Pain (7 - 10)    EXAM:  abd: abdominal wounds x 2 with packing in place (purulent draiange vs fat necrosis); minimal surrounding erythema (?chronic); nontender. Packing changed    Labs:  CAPILLARY BLOOD GLUCOSE      POCT Blood Glucose.: 108 mg/dL (08 Dec 2019 16:55)  POCT Blood Glucose.: 101 mg/dL (08 Dec 2019 07:41)  POCT Blood Glucose.: 95 mg/dL (07 Dec 2019 21:12)                          11.9   8.98  )-----------( 186      ( 08 Dec 2019 04:30 )             36.8       Auto Neutrophil %: 62.6 % (19 @ 04:30)  Auto Immature Granulocyte %: 0.4 % (19 @ 04:30)        142  |  103  |  13  ----------------------------<  94  3.2<L>   |  26  |  0.7      Calcium, Total Serum: 10.3 mg/dL (19 @ 04:30)      LFTs:             6.5  | 1.2  | 34       ------------------[113     ( 07 Dec 2019 00:55 )  3.9  | x    | 19          Lipase:x      Amylase:x        Clostridium difficile Toxin by PCR (19 @ 09:23)    Clostridium difficile Toxin by PCR: RESULT INTERPRETATION:    Detected - Clostridium difficile toxin B detected by amplified DNA PCR    C Diff by PCR Result: Positive      Urinalysis Basic - ( 07 Dec 2019 04:05 )    Color: Yellow / Appearance: Clear / S.025 / pH: x  Gluc: x / Ketone: Negative  / Bili: Negative / Urobili: 0.2 mg/dL   Blood: x / Protein: Negative mg/dL / Nitrite: Negative   Leuk Esterase: Negative / RBC: x / WBC x   Sq Epi: x / Non Sq Epi: x / Bacteria: x

## 2022-12-16 NOTE — ED ADULT TRIAGE NOTE - HEART RATE (BEATS/MIN)
Quality 431: Preventive Care And Screening: Unhealthy Alcohol Use - Screening: Patient not identified as an unhealthy alcohol user when screened for unhealthy alcohol use using a systematic screening method Detail Level: Detailed Quality 110: Preventive Care And Screening: Influenza Immunization: Influenza Immunization previously received during influenza season Quality 226: Preventive Care And Screening: Tobacco Use: Screening And Cessation Intervention: Patient screened for tobacco use and is an ex/non-smoker Quality 130: Documentation Of Current Medications In The Medical Record: Current Medications Documented 93

## 2023-01-31 ENCOUNTER — INPATIENT (INPATIENT)
Facility: HOSPITAL | Age: 73
LOS: 24 days | Discharge: NURSING, REHAB OR LONG-TERM CARE | DRG: 710 | End: 2023-02-25
Attending: INTERNAL MEDICINE | Admitting: INTERNAL MEDICINE
Payer: MEDICAID

## 2023-01-31 VITALS
TEMPERATURE: 98 F | RESPIRATION RATE: 24 BRPM | SYSTOLIC BLOOD PRESSURE: 84 MMHG | OXYGEN SATURATION: 95 % | DIASTOLIC BLOOD PRESSURE: 53 MMHG | HEART RATE: 53 BPM
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
## 2023-01-31 DIAGNOSIS — Z90.49 ACQUIRED ABSENCE OF OTHER SPECIFIED PARTS OF DIGESTIVE TRACT: Chronic | ICD-10-CM

## 2023-01-31 DIAGNOSIS — Z98.890 OTHER SPECIFIED POSTPROCEDURAL STATES: Chronic | ICD-10-CM

## 2023-01-31 LAB
ALBUMIN SERPL ELPH-MCNC: 3.5 G/DL — SIGNIFICANT CHANGE UP (ref 3.5–5.2)
ALP SERPL-CCNC: 283 U/L — HIGH (ref 30–115)
ALT FLD-CCNC: 59 U/L — HIGH (ref 0–41)
ANION GAP SERPL CALC-SCNC: 19 MMOL/L — HIGH (ref 7–14)
APPEARANCE UR: ABNORMAL
AST SERPL-CCNC: 24 U/L — SIGNIFICANT CHANGE UP (ref 0–41)
BACTERIA # UR AUTO: ABNORMAL
BASE EXCESS BLDV CALC-SCNC: -10 MMOL/L — LOW (ref -2–3)
BASOPHILS # BLD AUTO: 0.02 K/UL — SIGNIFICANT CHANGE UP (ref 0–0.2)
BASOPHILS NFR BLD AUTO: 0.2 % — SIGNIFICANT CHANGE UP (ref 0–1)
BILIRUB SERPL-MCNC: 0.7 MG/DL — SIGNIFICANT CHANGE UP (ref 0.2–1.2)
BILIRUB UR-MCNC: NEGATIVE — SIGNIFICANT CHANGE UP
BUN SERPL-MCNC: 66 MG/DL — CRITICAL HIGH (ref 10–20)
CA-I SERPL-SCNC: 1.05 MMOL/L — LOW (ref 1.15–1.33)
CALCIUM SERPL-MCNC: 7.8 MG/DL — LOW (ref 8.4–10.4)
CHLORIDE SERPL-SCNC: 85 MMOL/L — LOW (ref 98–110)
CO2 SERPL-SCNC: 18 MMOL/L — SIGNIFICANT CHANGE UP (ref 17–32)
COLOR SPEC: ABNORMAL
CREAT SERPL-MCNC: 5.1 MG/DL — CRITICAL HIGH (ref 0.7–1.5)
DIFF PNL FLD: ABNORMAL
EGFR: 9 ML/MIN/1.73M2 — LOW
EOSINOPHIL # BLD AUTO: 0.02 K/UL — SIGNIFICANT CHANGE UP (ref 0–0.7)
EOSINOPHIL NFR BLD AUTO: 0.2 % — SIGNIFICANT CHANGE UP (ref 0–8)
EPI CELLS # UR: >27 /HPF — HIGH (ref 0–5)
FLUAV AG NPH QL: SIGNIFICANT CHANGE UP
FLUBV AG NPH QL: SIGNIFICANT CHANGE UP
GAS PNL BLDV: 120 MMOL/L — CRITICAL LOW (ref 136–145)
GAS PNL BLDV: SIGNIFICANT CHANGE UP
GLUCOSE SERPL-MCNC: 113 MG/DL — HIGH (ref 70–99)
GLUCOSE UR QL: NEGATIVE — SIGNIFICANT CHANGE UP
HCO3 BLDV-SCNC: 17 MMOL/L — LOW (ref 22–29)
HCT VFR BLD CALC: 30.7 % — LOW (ref 37–47)
HCT VFR BLDA CALC: 32 % — LOW (ref 39–51)
HGB BLD CALC-MCNC: 10.6 G/DL — LOW (ref 12.6–17.4)
HGB BLD-MCNC: 10.3 G/DL — LOW (ref 12–16)
HYALINE CASTS # UR AUTO: >145 /LPF — HIGH (ref 0–7)
IMM GRANULOCYTES NFR BLD AUTO: 0.6 % — HIGH (ref 0.1–0.3)
KETONES UR-MCNC: NEGATIVE — SIGNIFICANT CHANGE UP
LACTATE BLDV-MCNC: 1.1 MMOL/L — SIGNIFICANT CHANGE UP (ref 0.5–2)
LACTATE SERPL-SCNC: 1 MMOL/L — SIGNIFICANT CHANGE UP (ref 0.7–2)
LEUKOCYTE ESTERASE UR-ACNC: ABNORMAL
LYMPHOCYTES # BLD AUTO: 0.77 K/UL — LOW (ref 1.2–3.4)
LYMPHOCYTES # BLD AUTO: 6.2 % — LOW (ref 20.5–51.1)
MAGNESIUM SERPL-MCNC: 1.4 MG/DL — LOW (ref 1.8–2.4)
MCHC RBC-ENTMCNC: 28.9 PG — SIGNIFICANT CHANGE UP (ref 27–31)
MCHC RBC-ENTMCNC: 33.6 G/DL — SIGNIFICANT CHANGE UP (ref 32–37)
MCV RBC AUTO: 86.2 FL — SIGNIFICANT CHANGE UP (ref 81–99)
MONOCYTES # BLD AUTO: 0.46 K/UL — SIGNIFICANT CHANGE UP (ref 0.1–0.6)
MONOCYTES NFR BLD AUTO: 3.7 % — SIGNIFICANT CHANGE UP (ref 1.7–9.3)
NEUTROPHILS # BLD AUTO: 11.07 K/UL — HIGH (ref 1.4–6.5)
NEUTROPHILS NFR BLD AUTO: 89.1 % — HIGH (ref 42.2–75.2)
NITRITE UR-MCNC: NEGATIVE — SIGNIFICANT CHANGE UP
NRBC # BLD: 0 /100 WBCS — SIGNIFICANT CHANGE UP (ref 0–0)
NT-PROBNP SERPL-SCNC: 3482 PG/ML — HIGH (ref 0–300)
PCO2 BLDV: 42 MMHG — SIGNIFICANT CHANGE UP (ref 39–42)
PH BLDV: 7.22 — LOW (ref 7.32–7.43)
PH UR: 6.5 — SIGNIFICANT CHANGE UP (ref 5–8)
PLATELET # BLD AUTO: 123 K/UL — LOW (ref 130–400)
PO2 BLDV: 36 MMHG — SIGNIFICANT CHANGE UP
POTASSIUM BLDV-SCNC: 3.4 MMOL/L — LOW (ref 3.5–5.1)
POTASSIUM SERPL-MCNC: 3.4 MMOL/L — LOW (ref 3.5–5)
POTASSIUM SERPL-SCNC: 3.4 MMOL/L — LOW (ref 3.5–5)
PROT SERPL-MCNC: 6.1 G/DL — SIGNIFICANT CHANGE UP (ref 6–8)
PROT UR-MCNC: ABNORMAL
RBC # BLD: 3.56 M/UL — LOW (ref 4.2–5.4)
RBC # FLD: 13.2 % — SIGNIFICANT CHANGE UP (ref 11.5–14.5)
RBC CASTS # UR COMP ASSIST: 6 /HPF — HIGH (ref 0–4)
RSV RNA NPH QL NAA+NON-PROBE: SIGNIFICANT CHANGE UP
SAO2 % BLDV: 53.3 % — SIGNIFICANT CHANGE UP
SARS-COV-2 RNA SPEC QL NAA+PROBE: DETECTED
SODIUM SERPL-SCNC: 122 MMOL/L — LOW (ref 135–146)
SP GR SPEC: 1.01 — SIGNIFICANT CHANGE UP (ref 1.01–1.03)
TROPONIN T SERPL-MCNC: 0.12 NG/ML — CRITICAL HIGH
UROBILINOGEN FLD QL: SIGNIFICANT CHANGE UP
WBC # BLD: 12.42 K/UL — HIGH (ref 4.8–10.8)
WBC # FLD AUTO: 12.42 K/UL — HIGH (ref 4.8–10.8)
WBC UR QL: >720 /HPF — HIGH (ref 0–5)

## 2023-01-31 PROCEDURE — 93010 ELECTROCARDIOGRAM REPORT: CPT

## 2023-01-31 PROCEDURE — 87449 NOS EACH ORGANISM AG IA: CPT

## 2023-01-31 PROCEDURE — 83615 LACTATE (LD) (LDH) ENZYME: CPT

## 2023-01-31 PROCEDURE — 81001 URINALYSIS AUTO W/SCOPE: CPT

## 2023-01-31 PROCEDURE — 86140 C-REACTIVE PROTEIN: CPT

## 2023-01-31 PROCEDURE — 97167 OT EVAL HIGH COMPLEX 60 MIN: CPT | Mod: GO

## 2023-01-31 PROCEDURE — 87077 CULTURE AEROBIC IDENTIFY: CPT

## 2023-01-31 PROCEDURE — 99291 CRITICAL CARE FIRST HOUR: CPT | Mod: 25

## 2023-01-31 PROCEDURE — 97535 SELF CARE MNGMENT TRAINING: CPT | Mod: GO

## 2023-01-31 PROCEDURE — 71250 CT THORAX DX C-: CPT

## 2023-01-31 PROCEDURE — 71250 CT THORAX DX C-: CPT | Mod: 26,MA

## 2023-01-31 PROCEDURE — 85014 HEMATOCRIT: CPT

## 2023-01-31 PROCEDURE — 87070 CULTURE OTHR SPECIMN AEROBIC: CPT

## 2023-01-31 PROCEDURE — 76770 US EXAM ABDO BACK WALL COMP: CPT

## 2023-01-31 PROCEDURE — 97530 THERAPEUTIC ACTIVITIES: CPT | Mod: GO

## 2023-01-31 PROCEDURE — 87015 SPECIMEN INFECT AGNT CONCNTJ: CPT

## 2023-01-31 PROCEDURE — 92610 EVALUATE SWALLOWING FUNCTION: CPT | Mod: GN

## 2023-01-31 PROCEDURE — 94002 VENT MGMT INPAT INIT DAY: CPT

## 2023-01-31 PROCEDURE — 85025 COMPLETE CBC W/AUTO DIFF WBC: CPT

## 2023-01-31 PROCEDURE — 87899 AGENT NOS ASSAY W/OPTIC: CPT

## 2023-01-31 PROCEDURE — 83036 HEMOGLOBIN GLYCOSYLATED A1C: CPT

## 2023-01-31 PROCEDURE — 87640 STAPH A DNA AMP PROBE: CPT

## 2023-01-31 PROCEDURE — 84145 PROCALCITONIN (PCT): CPT

## 2023-01-31 PROCEDURE — 84481 FREE ASSAY (FT-3): CPT

## 2023-01-31 PROCEDURE — 84132 ASSAY OF SERUM POTASSIUM: CPT

## 2023-01-31 PROCEDURE — 84100 ASSAY OF PHOSPHORUS: CPT

## 2023-01-31 PROCEDURE — 86901 BLOOD TYPING SEROLOGIC RH(D): CPT

## 2023-01-31 PROCEDURE — C1751: CPT

## 2023-01-31 PROCEDURE — 87040 BLOOD CULTURE FOR BACTERIA: CPT

## 2023-01-31 PROCEDURE — 83605 ASSAY OF LACTIC ACID: CPT

## 2023-01-31 PROCEDURE — 82330 ASSAY OF CALCIUM: CPT

## 2023-01-31 PROCEDURE — 86900 BLOOD TYPING SEROLOGIC ABO: CPT

## 2023-01-31 PROCEDURE — 0241U: CPT

## 2023-01-31 PROCEDURE — 82728 ASSAY OF FERRITIN: CPT

## 2023-01-31 PROCEDURE — 93005 ELECTROCARDIOGRAM TRACING: CPT

## 2023-01-31 PROCEDURE — 85027 COMPLETE CBC AUTOMATED: CPT

## 2023-01-31 PROCEDURE — 86850 RBC ANTIBODY SCREEN: CPT

## 2023-01-31 PROCEDURE — 82652 VIT D 1 25-DIHYDROXY: CPT

## 2023-01-31 PROCEDURE — 82550 ASSAY OF CK (CPK): CPT

## 2023-01-31 PROCEDURE — 84484 ASSAY OF TROPONIN QUANT: CPT

## 2023-01-31 PROCEDURE — 82803 BLOOD GASES ANY COMBINATION: CPT

## 2023-01-31 PROCEDURE — 99285 EMERGENCY DEPT VISIT HI MDM: CPT

## 2023-01-31 PROCEDURE — 80053 COMPREHEN METABOLIC PANEL: CPT

## 2023-01-31 PROCEDURE — 80048 BASIC METABOLIC PNL TOTAL CA: CPT

## 2023-01-31 PROCEDURE — 87493 C DIFF AMPLIFIED PROBE: CPT

## 2023-01-31 PROCEDURE — 87150 DNA/RNA AMPLIFIED PROBE: CPT

## 2023-01-31 PROCEDURE — 87206 SMEAR FLUORESCENT/ACID STAI: CPT

## 2023-01-31 PROCEDURE — 82310 ASSAY OF CALCIUM: CPT

## 2023-01-31 PROCEDURE — 92526 ORAL FUNCTION THERAPY: CPT | Mod: GN

## 2023-01-31 PROCEDURE — 93306 TTE W/DOPPLER COMPLETE: CPT

## 2023-01-31 PROCEDURE — 76830 TRANSVAGINAL US NON-OB: CPT

## 2023-01-31 PROCEDURE — 97110 THERAPEUTIC EXERCISES: CPT | Mod: GP

## 2023-01-31 PROCEDURE — 82962 GLUCOSE BLOOD TEST: CPT

## 2023-01-31 PROCEDURE — 87186 SC STD MICRODIL/AGAR DIL: CPT

## 2023-01-31 PROCEDURE — 87641 MR-STAPH DNA AMP PROBE: CPT

## 2023-01-31 PROCEDURE — 84439 ASSAY OF FREE THYROXINE: CPT

## 2023-01-31 PROCEDURE — 74176 CT ABD & PELVIS W/O CONTRAST: CPT | Mod: 26,MA

## 2023-01-31 PROCEDURE — 83880 ASSAY OF NATRIURETIC PEPTIDE: CPT

## 2023-01-31 PROCEDURE — 94660 CPAP INITIATION&MGMT: CPT

## 2023-01-31 PROCEDURE — 94760 N-INVAS EAR/PLS OXIMETRY 1: CPT

## 2023-01-31 PROCEDURE — 36556 INSERT NON-TUNNEL CV CATH: CPT

## 2023-01-31 PROCEDURE — 87507 IADNA-DNA/RNA PROBE TQ 12-25: CPT

## 2023-01-31 PROCEDURE — 82306 VITAMIN D 25 HYDROXY: CPT

## 2023-01-31 PROCEDURE — 96374 THER/PROPH/DIAG INJ IV PUSH: CPT | Mod: XU

## 2023-01-31 PROCEDURE — 36415 COLL VENOUS BLD VENIPUNCTURE: CPT

## 2023-01-31 PROCEDURE — 84443 ASSAY THYROID STIM HORMONE: CPT

## 2023-01-31 PROCEDURE — 85379 FIBRIN DEGRADATION QUANT: CPT

## 2023-01-31 PROCEDURE — 31500 INSERT EMERGENCY AIRWAY: CPT

## 2023-01-31 PROCEDURE — 85730 THROMBOPLASTIN TIME PARTIAL: CPT

## 2023-01-31 PROCEDURE — 71045 X-RAY EXAM CHEST 1 VIEW: CPT | Mod: 26

## 2023-01-31 PROCEDURE — U0005: CPT

## 2023-01-31 PROCEDURE — 85018 HEMOGLOBIN: CPT

## 2023-01-31 PROCEDURE — L8699: CPT

## 2023-01-31 PROCEDURE — 94003 VENT MGMT INPAT SUBQ DAY: CPT

## 2023-01-31 PROCEDURE — 76857 US EXAM PELVIC LIMITED: CPT

## 2023-01-31 PROCEDURE — 87086 URINE CULTURE/COLONY COUNT: CPT

## 2023-01-31 PROCEDURE — 93970 EXTREMITY STUDY: CPT

## 2023-01-31 PROCEDURE — 83735 ASSAY OF MAGNESIUM: CPT

## 2023-01-31 PROCEDURE — 83970 ASSAY OF PARATHORMONE: CPT

## 2023-01-31 PROCEDURE — 85610 PROTHROMBIN TIME: CPT

## 2023-01-31 PROCEDURE — 84295 ASSAY OF SERUM SODIUM: CPT

## 2023-01-31 PROCEDURE — U0003: CPT

## 2023-01-31 PROCEDURE — 74176 CT ABD & PELVIS W/O CONTRAST: CPT

## 2023-01-31 PROCEDURE — 97162 PT EVAL MOD COMPLEX 30 MIN: CPT | Mod: GP

## 2023-01-31 PROCEDURE — 87116 MYCOBACTERIA CULTURE: CPT

## 2023-01-31 PROCEDURE — 92612 ENDOSCOPY SWALLOW (FEES) VID: CPT | Mod: GN

## 2023-01-31 PROCEDURE — 96375 TX/PRO/DX INJ NEW DRUG ADDON: CPT | Mod: XU

## 2023-01-31 PROCEDURE — C9113: CPT

## 2023-01-31 PROCEDURE — 88305 TISSUE EXAM BY PATHOLOGIST: CPT

## 2023-01-31 PROCEDURE — 76937 US GUIDE VASCULAR ACCESS: CPT | Mod: 26

## 2023-01-31 PROCEDURE — 74018 RADEX ABDOMEN 1 VIEW: CPT

## 2023-01-31 PROCEDURE — 71045 X-RAY EXAM CHEST 1 VIEW: CPT

## 2023-01-31 RX ORDER — ONDANSETRON 8 MG/1
4 TABLET, FILM COATED ORAL ONCE
Refills: 0 | Status: COMPLETED | OUTPATIENT
Start: 2023-01-31 | End: 2023-01-31

## 2023-01-31 RX ORDER — MAGNESIUM SULFATE 500 MG/ML
2 VIAL (ML) INJECTION ONCE
Refills: 0 | Status: COMPLETED | OUTPATIENT
Start: 2023-01-31 | End: 2023-01-31

## 2023-01-31 RX ORDER — SODIUM CHLORIDE 9 MG/ML
1000 INJECTION, SOLUTION INTRAVENOUS ONCE
Refills: 0 | Status: COMPLETED | OUTPATIENT
Start: 2023-01-31 | End: 2023-01-31

## 2023-01-31 RX ORDER — NOREPINEPHRINE BITARTRATE/D5W 8 MG/250ML
0.05 PLASTIC BAG, INJECTION (ML) INTRAVENOUS
Qty: 8 | Refills: 0 | Status: DISCONTINUED | OUTPATIENT
Start: 2023-01-31 | End: 2023-02-01

## 2023-01-31 RX ORDER — CEFEPIME 1 G/1
1000 INJECTION, POWDER, FOR SOLUTION INTRAMUSCULAR; INTRAVENOUS ONCE
Refills: 0 | Status: COMPLETED | OUTPATIENT
Start: 2023-01-31 | End: 2023-01-31

## 2023-01-31 RX ADMIN — Medication 25 GRAM(S): at 21:50

## 2023-01-31 RX ADMIN — CEFEPIME 100 MILLIGRAM(S): 1 INJECTION, POWDER, FOR SOLUTION INTRAMUSCULAR; INTRAVENOUS at 20:31

## 2023-01-31 RX ADMIN — Medication 100 MILLIGRAM(S): at 23:35

## 2023-01-31 RX ADMIN — Medication 10.3 MICROGRAM(S)/KG/MIN: at 20:11

## 2023-01-31 RX ADMIN — SODIUM CHLORIDE 1000 MILLILITER(S): 9 INJECTION, SOLUTION INTRAVENOUS at 18:51

## 2023-01-31 RX ADMIN — ONDANSETRON 4 MILLIGRAM(S): 8 TABLET, FILM COATED ORAL at 21:51

## 2023-01-31 RX ADMIN — ETOMIDATE 20 MILLIGRAM(S): 2 INJECTION INTRAVENOUS at 23:31

## 2023-01-31 NOTE — ED ADULT NURSE NOTE - NSICDXPASTMEDICALHX_GEN_ALL_CORE_FT
PAST MEDICAL HISTORY:  Active asthma     Afib     Diabetes mellitus pt denies and refuses fingerstick    Gastroesophageal reflux disease     Generalized OA     HTN (hypertension)     Obesity

## 2023-01-31 NOTE — ED PROVIDER NOTE - CLINICAL SUMMARY MEDICAL DECISION MAKING FREE TEXT BOX
Patient presenting with acute respiratory distress, hypoxic on room air and severely tachypneic requiring my immediate attention.  On arrival to ED patient afebrile, but with labored breathing, confirmed to be 70s on room air in terms of O2 saturation.  Patient seen on arrival at which time patient placed on BiPAP with improvement of respiratory status.  EKG obtained and nonspecific but no evidence of STEMI.  Labs remarkable for mild leukocytosis to 12,000 as well as troponin of 0.12 and proBNP of 3400.  Creatinine also noted to be elevated to 5.1, and sodium noted to be 122.  There was also signs of likely metabolic acidosis from acute kidney failure but no evidence of hyperkalemia chest x-ray.  UA showed likely UTI for which abx were given.  Showed bilateral congestion but no focal consolidations to suggest pneumonia and no pneumothorax.  COVID swab positive which is likely etiology of patient's congestion.  While on BiPAP, patient initially improved from respiratory standpoint, however became hypotensive despite IV fluids and ultimately required central line placement and pressors which improved blood pressure.  Consulted ICU who evaluated the patient in the ED and ultimately despite patient's initial improvement, recommending intubation as patient began to decline again in terms of respiratory status despite BiPAP.  After discussion with family and patient, patient emergently intubated for airway protection and persistent respiratory failure.  Will admit to ICU for further management.  Patient in critical condition at time of admission.

## 2023-01-31 NOTE — ED PROCEDURE NOTE - NS ED ATTENDING STATEMENT MOD
This was a shared visit with the MIRIAM. I reviewed and verified the documentation and independently performed the documented:
Attending with

## 2023-01-31 NOTE — ED PROVIDER NOTE - CARE PLAN
1 Principal Discharge DX:	Acute respiratory failure with hypoxia  Secondary Diagnosis:	Pleural effusion  Secondary Diagnosis:	Acute renal failure  Secondary Diagnosis:	COVID-19  Secondary Diagnosis:	Acute UTI

## 2023-01-31 NOTE — ED PROVIDER NOTE - PROGRESS NOTE DETAILS
MS- Femoral central line placed Pt evaluated by ICU fellow, recommends intubation and accepts admission. Signed out case to ICU resident.

## 2023-01-31 NOTE — ED PROVIDER NOTE - TEST CONSIDERED BUT NOT PERFORMED
Tests Considered But Not Performed CTA of the chest to r/o PE considered but not performed - CXR shows signs of viral pneumonia and patient already anticoagulated, low suspicion for PE

## 2023-01-31 NOTE — ED PROCEDURE NOTE - CPROC ED NUMBER OF ATTEMPTS1
POST OP BILATERAL MASTECTOMY, RIGHT SN BIOPSY    PT DOING WELL  NO PROBLEMS REPORTED  NO COMPLAINTS OF PAIN    BILATERAL INCISIONS HEALING WELL  ONE DRAIN READY FOR REMOVAL, PT TOLERATED WELL    QUESTIONS ANSWERED  PT IS DOING WELL MANAGING DRAINS    PATH  NOT READY, CALLED, NO RESULTS YET    PLAN  CONTINUE DRAIN MANAGEMENT  RTC ONE WEEK    
1
2

## 2023-01-31 NOTE — ED ADULT NURSE NOTE - NSICDXPASTSURGICALHX_GEN_ALL_CORE_FT
PAST SURGICAL HISTORY:  H/O hernia repair 2011 and revised in 2013    History of cholecystectomy

## 2023-01-31 NOTE — ED PROCEDURE NOTE - CPROC ED TIME OUT STATEMENT1
“Patient's name, , procedure and correct site were confirmed during the [place] Timeout.”
“Patient's name, , procedure and correct site were confirmed during the [place] Timeout.”

## 2023-01-31 NOTE — ED PROVIDER NOTE - OBJECTIVE STATEMENT
73 y/o F with PMH Afib, DM, HTN, HLD, OA, GERD BIBEMS 71 y/o F with PMH Afib, DM, HTN, HLD, OA, GERD BIBEMS for resp distress. Per EMS hypoxic on scene requiring NRB. COVID+. No recent fever. PT denies chest pain. 73 y/o F with PMH Afib, DM, HTN, HLD, OA, GERD BIBEMS for resp distress. Per EMS hypoxic on scene requiring NRB. COVID+. No recent fever. PT denies chest pain. Patient was found to be hypoxic at facility when EMS arrived. O2 was in 70s on room air. Patient complains of chest discomfort and shortness of breath. Patient was diagnosed 4 days ago with COVID 19. 73 y/o F with PMH Afib, DM, HTN, HLD, OA, GERD BIBEMS for resp distress. Per EMS hypoxic on scene requiring NRB. COVID+. No recent fever. Patient was found to be hypoxic at facility when EMS arrived. O2 was in 70s on room air. Patient complains of chest discomfort and shortness of breath. Patient was diagnosed 4 days ago with COVID 19.

## 2023-01-31 NOTE — ED ADULT NURSE NOTE - OBJECTIVE STATEMENT
pre note call. pt came in for resp distress. as per EMS, pt is hypotensive and 80& on RA. pt alert, a&ox3.

## 2023-01-31 NOTE — ED PROVIDER NOTE - PHYSICAL EXAMINATION
CONSTITUTIONAL: Well-developed; well-nourished; respiratory distress  SKIN: Warm, dry  HEAD: Normocephalic; atraumatic  EYES: PERRL, EOMI, normal sclera and conjunctiva   ENT: No nasal discharge; airway clear.  CARD:  Bradycardic. Regular rhythm. 2+ distal pulses.   RESP: Tachypneic. Abdominal retractions. Diffuse rhonchi.  ABD: Normoactive BS. Soft, nontender, nondistended.  EXT: Normal ROM.   NEURO: Alert, oriented, grossly unremarkable  PSYCH: Cooperative, appropriate. CONSTITUTIONAL: Well-developed; well-nourished; respiratory distress  SKIN: Warm, dry  HEAD: Normocephalic; atraumatic  EYES: PERRL, EOMI, normal sclera and conjunctiva   ENT: Speaking full sentences  CARD:  Bradycardic. Regular rhythm. 2+ distal pulses.   RESP: Tachypneic. Abdominal retractions. Diffuse rhonchi.  ABD: Normoactive BS. Soft, nontender, nondistended.  EXT: Normal ROM.   NEURO: Alert, oriented, grossly unremarkable  PSYCH: Cooperative, appropriate.

## 2023-01-31 NOTE — ED PROVIDER NOTE - CONSIDERATION OF ADMISSION OBSERVATION
Patient with hypoxic respiratory failure requiring bipap and eventually intubation. Will require admission. Consideration of Admission/Observation

## 2023-01-31 NOTE — CHART NOTE - NSCHARTNOTEFT_GEN_A_CORE
IMPRESSION:  AHRF  Sepsis POA  Septic shock  COVID +  NOLA  likely ATN vs prerenal  Possible superimposed bacterial PNA  possible UTI  HO congentital unilateral kidney  HO A FIb on AC  HO DM  HO HTN  HO HLD    PLAN:    CNS: Sedate with fentanyl + precedex    HEENT: Oral care    PULMONARY:  HOB @ 45 degrees.  Vent changes as follows: ARDSNETWORK Vent setting, ABG post intubation. Dexa  6mg daily    CARDIOVASCULAR: NS bolus 50cc/hr, wean pressors,     GI: GI prophylaxis.  NPO.  Bowel regimen     RENAL:  Follow up lytes.  Correct as needed. renal US. Renal eval    INFECTIOUS DISEASE: Follow up cultures. rocephin + levaquin. check procal, urine leginella, strep. ID eval    HEMATOLOGICAL:  heparin GTT. monitor H/H    ENDOCRINE:  Follow up FS.  Insulin protocol if needed.    MUSCULOSKELETAL: bedrest    MICU IMPRESSION:  AHRF  Sepsis POA  Septic shock  COVID +  NOLA  likely ATN vs prerenal  Possible superimposed bacterial PNA/ ?aspiration  Hyponatremia  possible UTI  HO congentital unilateral kidney  HO A FIb on AC  HO DM  HO HTN  HO HLD    PLAN:    CNS: Sedate with fentanyl + precedex    HEENT: Oral care    PULMONARY:  HOB @ 45 degrees.  Vent changes as follows: ARDSNET Vent setting, ABG post intubation. Moniot P/Pl/ DP. Dexa  6mg daily. repeat CXR in AM    CARDIOVASCULAR: NS bolus, wean pressors, ECHO    GI: GI prophylaxis.  NPO.  Bowel regimen     RENAL:  Follow up lytes.  Correct as needed. renal US. urine lytes, serum/urine osm. Renal eval    INFECTIOUS DISEASE: Follow up cultures. rocephin + levaquin. check procal, urine legionella/ strep. MRSA nares. DTA. ID eval    HEMATOLOGICAL:  heparin GTT. monitor ptt and H/H    ENDOCRINE:  Follow up FS.  Insulin protocol if needed. check TSH    MUSCULOSKELETAL: bedrest    MICU

## 2023-02-01 LAB
ALBUMIN SERPL ELPH-MCNC: 3.1 G/DL — LOW (ref 3.5–5.2)
ALP SERPL-CCNC: 266 U/L — HIGH (ref 30–115)
ALT FLD-CCNC: 47 U/L — HIGH (ref 0–41)
ANION GAP SERPL CALC-SCNC: 22 MMOL/L — HIGH (ref 7–14)
ANION GAP SERPL CALC-SCNC: 24 MMOL/L — HIGH (ref 7–14)
APTT BLD: 117.1 SEC — CRITICAL HIGH (ref 27–39.2)
APTT BLD: 34.4 SEC — SIGNIFICANT CHANGE UP (ref 27–39.2)
AST SERPL-CCNC: 24 U/L — SIGNIFICANT CHANGE UP (ref 0–41)
BASE EXCESS BLDA CALC-SCNC: -18.2 MMOL/L — LOW (ref -2–3)
BILIRUB SERPL-MCNC: 0.5 MG/DL — SIGNIFICANT CHANGE UP (ref 0.2–1.2)
BUN SERPL-MCNC: 73 MG/DL — CRITICAL HIGH (ref 10–20)
BUN SERPL-MCNC: 75 MG/DL — CRITICAL HIGH (ref 10–20)
CALCIUM SERPL-MCNC: 7.3 MG/DL — LOW (ref 8.4–10.5)
CALCIUM SERPL-MCNC: 7.6 MG/DL — LOW (ref 8.4–10.5)
CHLORIDE SERPL-SCNC: 87 MMOL/L — LOW (ref 98–110)
CHLORIDE SERPL-SCNC: 88 MMOL/L — LOW (ref 98–110)
CK SERPL-CCNC: 107 U/L — SIGNIFICANT CHANGE UP (ref 0–225)
CK SERPL-CCNC: 139 U/L — SIGNIFICANT CHANGE UP (ref 0–225)
CO2 SERPL-SCNC: 13 MMOL/L — LOW (ref 17–32)
CO2 SERPL-SCNC: 17 MMOL/L — SIGNIFICANT CHANGE UP (ref 17–32)
CREAT SERPL-MCNC: 5.3 MG/DL — CRITICAL HIGH (ref 0.7–1.5)
CREAT SERPL-MCNC: 5.4 MG/DL — CRITICAL HIGH (ref 0.7–1.5)
CRP SERPL-MCNC: 107.1 MG/L — HIGH
D DIMER BLD IA.RAPID-MCNC: 818 NG/ML DDU — HIGH
EGFR: 8 ML/MIN/1.73M2 — LOW
EGFR: 8 ML/MIN/1.73M2 — LOW
GAS PNL BLDA: SIGNIFICANT CHANGE UP
GAS PNL BLDA: SIGNIFICANT CHANGE UP
GLUCOSE BLDC GLUCOMTR-MCNC: 260 MG/DL — HIGH (ref 70–99)
GLUCOSE SERPL-MCNC: 212 MG/DL — HIGH (ref 70–99)
GLUCOSE SERPL-MCNC: 340 MG/DL — HIGH (ref 70–99)
HCO3 BLDA-SCNC: 12 MMOL/L — LOW (ref 21–28)
HCT VFR BLD CALC: 34.9 % — LOW (ref 37–47)
HCT VFR BLD CALC: 35 % — LOW (ref 37–47)
HGB BLD-MCNC: 12 G/DL — SIGNIFICANT CHANGE UP (ref 12–16)
HGB BLD-MCNC: 12 G/DL — SIGNIFICANT CHANGE UP (ref 12–16)
INR BLD: 1.15 RATIO — SIGNIFICANT CHANGE UP (ref 0.65–1.3)
LACTATE SERPL-SCNC: 1.5 MMOL/L — SIGNIFICANT CHANGE UP (ref 0.7–2)
LDH SERPL L TO P-CCNC: 203 — SIGNIFICANT CHANGE UP (ref 50–242)
MAGNESIUM SERPL-MCNC: 1.9 MG/DL — SIGNIFICANT CHANGE UP (ref 1.8–2.4)
MCHC RBC-ENTMCNC: 28.4 PG — SIGNIFICANT CHANGE UP (ref 27–31)
MCHC RBC-ENTMCNC: 29.1 PG — SIGNIFICANT CHANGE UP (ref 27–31)
MCHC RBC-ENTMCNC: 34.3 G/DL — SIGNIFICANT CHANGE UP (ref 32–37)
MCHC RBC-ENTMCNC: 34.4 G/DL — SIGNIFICANT CHANGE UP (ref 32–37)
MCV RBC AUTO: 82.9 FL — SIGNIFICANT CHANGE UP (ref 81–99)
MCV RBC AUTO: 84.7 FL — SIGNIFICANT CHANGE UP (ref 81–99)
MRSA PCR RESULT.: NEGATIVE — SIGNIFICANT CHANGE UP
NRBC # BLD: 0 /100 WBCS — SIGNIFICANT CHANGE UP (ref 0–0)
NRBC # BLD: 0 /100 WBCS — SIGNIFICANT CHANGE UP (ref 0–0)
PCO2 BLDA: 47 MMHG — SIGNIFICANT CHANGE UP (ref 25–48)
PH BLDA: 7.03 — CRITICAL LOW (ref 7.35–7.45)
PLATELET # BLD AUTO: 230 K/UL — SIGNIFICANT CHANGE UP (ref 130–400)
PLATELET # BLD AUTO: 316 K/UL — SIGNIFICANT CHANGE UP (ref 130–400)
PO2 BLDA: 67 MMHG — LOW (ref 83–108)
POTASSIUM SERPL-MCNC: 3.8 MMOL/L — SIGNIFICANT CHANGE UP (ref 3.5–5)
POTASSIUM SERPL-MCNC: 4 MMOL/L — SIGNIFICANT CHANGE UP (ref 3.5–5)
POTASSIUM SERPL-SCNC: 3.8 MMOL/L — SIGNIFICANT CHANGE UP (ref 3.5–5)
POTASSIUM SERPL-SCNC: 4 MMOL/L — SIGNIFICANT CHANGE UP (ref 3.5–5)
PROCALCITONIN SERPL-MCNC: 14.3 NG/ML — HIGH (ref 0.02–0.1)
PROT SERPL-MCNC: 5.4 G/DL — LOW (ref 6–8)
PROTHROM AB SERPL-ACNC: 13.2 SEC — HIGH (ref 9.95–12.87)
RBC # BLD: 4.12 M/UL — LOW (ref 4.2–5.4)
RBC # BLD: 4.22 M/UL — SIGNIFICANT CHANGE UP (ref 4.2–5.4)
RBC # FLD: 12.8 % — SIGNIFICANT CHANGE UP (ref 11.5–14.5)
RBC # FLD: 13 % — SIGNIFICANT CHANGE UP (ref 11.5–14.5)
SAO2 % BLDA: 89.4 % — LOW (ref 94–98)
SODIUM SERPL-SCNC: 125 MMOL/L — LOW (ref 135–146)
SODIUM SERPL-SCNC: 126 MMOL/L — LOW (ref 135–146)
TROPONIN T SERPL-MCNC: 0.08 NG/ML — CRITICAL HIGH
WBC # BLD: 29.21 K/UL — HIGH (ref 4.8–10.8)
WBC # BLD: 32.72 K/UL — HIGH (ref 4.8–10.8)
WBC # FLD AUTO: 29.21 K/UL — HIGH (ref 4.8–10.8)
WBC # FLD AUTO: 32.72 K/UL — HIGH (ref 4.8–10.8)

## 2023-02-01 PROCEDURE — 99291 CRITICAL CARE FIRST HOUR: CPT

## 2023-02-01 PROCEDURE — 71045 X-RAY EXAM CHEST 1 VIEW: CPT | Mod: 26

## 2023-02-01 PROCEDURE — 93970 EXTREMITY STUDY: CPT | Mod: 26

## 2023-02-01 RX ORDER — ETOMIDATE 2 MG/ML
20 INJECTION INTRAVENOUS ONCE
Refills: 0 | Status: COMPLETED | OUTPATIENT
Start: 2023-02-01 | End: 2023-01-11

## 2023-02-01 RX ORDER — CEFTRIAXONE 500 MG/1
1000 INJECTION, POWDER, FOR SOLUTION INTRAMUSCULAR; INTRAVENOUS EVERY 24 HOURS
Refills: 0 | Status: DISCONTINUED | OUTPATIENT
Start: 2023-02-01 | End: 2023-02-02

## 2023-02-01 RX ORDER — FENTANYL CITRATE 50 UG/ML
0.5 INJECTION INTRAVENOUS
Qty: 2500 | Refills: 0 | Status: DISCONTINUED | OUTPATIENT
Start: 2023-02-01 | End: 2023-02-02

## 2023-02-01 RX ORDER — ACETAMINOPHEN 500 MG
650 TABLET ORAL EVERY 4 HOURS
Refills: 0 | Status: DISCONTINUED | OUTPATIENT
Start: 2023-02-01 | End: 2023-02-12

## 2023-02-01 RX ORDER — MAGNESIUM SULFATE 500 MG/ML
2 VIAL (ML) INJECTION ONCE
Refills: 0 | Status: COMPLETED | OUTPATIENT
Start: 2023-02-01 | End: 2023-02-01

## 2023-02-01 RX ORDER — ROCURONIUM BROMIDE 10 MG/ML
100 VIAL (ML) INTRAVENOUS ONCE
Refills: 0 | Status: COMPLETED | OUTPATIENT
Start: 2023-02-01 | End: 2023-01-31

## 2023-02-01 RX ORDER — HEPARIN SODIUM 5000 [USP'U]/ML
4000 INJECTION INTRAVENOUS; SUBCUTANEOUS EVERY 6 HOURS
Refills: 0 | Status: DISCONTINUED | OUTPATIENT
Start: 2023-02-01 | End: 2023-02-08

## 2023-02-01 RX ORDER — SODIUM BICARBONATE 1 MEQ/ML
100 SYRINGE (ML) INTRAVENOUS ONCE
Refills: 0 | Status: DISCONTINUED | OUTPATIENT
Start: 2023-02-01 | End: 2023-02-11

## 2023-02-01 RX ORDER — DEXMEDETOMIDINE HYDROCHLORIDE IN 0.9% SODIUM CHLORIDE 4 UG/ML
0.05 INJECTION INTRAVENOUS
Qty: 200 | Refills: 0 | Status: DISCONTINUED | OUTPATIENT
Start: 2023-02-01 | End: 2023-02-01

## 2023-02-01 RX ORDER — NOREPINEPHRINE BITARTRATE/D5W 8 MG/250ML
0.05 PLASTIC BAG, INJECTION (ML) INTRAVENOUS
Qty: 32 | Refills: 0 | Status: DISCONTINUED | OUTPATIENT
Start: 2023-02-01 | End: 2023-02-06

## 2023-02-01 RX ORDER — CHLORHEXIDINE GLUCONATE 213 G/1000ML
15 SOLUTION TOPICAL EVERY 12 HOURS
Refills: 0 | Status: DISCONTINUED | OUTPATIENT
Start: 2023-02-01 | End: 2023-02-07

## 2023-02-01 RX ORDER — SODIUM CHLORIDE 9 MG/ML
1000 INJECTION, SOLUTION INTRAVENOUS
Refills: 0 | Status: DISCONTINUED | OUTPATIENT
Start: 2023-02-01 | End: 2023-02-03

## 2023-02-01 RX ORDER — HEPARIN SODIUM 5000 [USP'U]/ML
9000 INJECTION INTRAVENOUS; SUBCUTANEOUS ONCE
Refills: 0 | Status: COMPLETED | OUTPATIENT
Start: 2023-02-01 | End: 2023-02-01

## 2023-02-01 RX ORDER — VASOPRESSIN 20 [USP'U]/ML
0.04 INJECTION INTRAVENOUS
Qty: 40 | Refills: 0 | Status: DISCONTINUED | OUTPATIENT
Start: 2023-02-01 | End: 2023-02-04

## 2023-02-01 RX ORDER — DEXAMETHASONE 0.5 MG/5ML
6 ELIXIR ORAL DAILY
Refills: 0 | Status: DISCONTINUED | OUTPATIENT
Start: 2023-02-01 | End: 2023-02-02

## 2023-02-01 RX ORDER — SENNA PLUS 8.6 MG/1
2 TABLET ORAL AT BEDTIME
Refills: 0 | Status: DISCONTINUED | OUTPATIENT
Start: 2023-02-01 | End: 2023-02-13

## 2023-02-01 RX ORDER — SODIUM CHLORIDE 9 MG/ML
500 INJECTION INTRAMUSCULAR; INTRAVENOUS; SUBCUTANEOUS ONCE
Refills: 0 | Status: COMPLETED | OUTPATIENT
Start: 2023-02-01 | End: 2023-02-01

## 2023-02-01 RX ORDER — PANTOPRAZOLE SODIUM 20 MG/1
40 TABLET, DELAYED RELEASE ORAL DAILY
Refills: 0 | Status: DISCONTINUED | OUTPATIENT
Start: 2023-02-01 | End: 2023-02-06

## 2023-02-01 RX ORDER — NOREPINEPHRINE BITARTRATE/D5W 8 MG/250ML
0.05 PLASTIC BAG, INJECTION (ML) INTRAVENOUS
Qty: 16 | Refills: 0 | Status: DISCONTINUED | OUTPATIENT
Start: 2023-02-01 | End: 2023-02-01

## 2023-02-01 RX ORDER — DEXMEDETOMIDINE HYDROCHLORIDE IN 0.9% SODIUM CHLORIDE 4 UG/ML
0.2 INJECTION INTRAVENOUS
Qty: 400 | Refills: 0 | Status: DISCONTINUED | OUTPATIENT
Start: 2023-02-01 | End: 2023-02-02

## 2023-02-01 RX ORDER — POLYETHYLENE GLYCOL 3350 17 G/17G
17 POWDER, FOR SOLUTION ORAL DAILY
Refills: 0 | Status: DISCONTINUED | OUTPATIENT
Start: 2023-02-01 | End: 2023-02-17

## 2023-02-01 RX ORDER — HEPARIN SODIUM 5000 [USP'U]/ML
INJECTION INTRAVENOUS; SUBCUTANEOUS
Qty: 25000 | Refills: 0 | Status: DISCONTINUED | OUTPATIENT
Start: 2023-02-01 | End: 2023-02-02

## 2023-02-01 RX ORDER — HEPARIN SODIUM 5000 [USP'U]/ML
9000 INJECTION INTRAVENOUS; SUBCUTANEOUS EVERY 6 HOURS
Refills: 0 | Status: DISCONTINUED | OUTPATIENT
Start: 2023-02-01 | End: 2023-02-08

## 2023-02-01 RX ORDER — SODIUM BICARBONATE 1 MEQ/ML
100 SYRINGE (ML) INTRAVENOUS ONCE
Refills: 0 | Status: COMPLETED | OUTPATIENT
Start: 2023-02-01 | End: 2023-02-01

## 2023-02-01 RX ADMIN — Medication 5.16 MICROGRAM(S)/KG/MIN: at 14:26

## 2023-02-01 RX ADMIN — DEXMEDETOMIDINE HYDROCHLORIDE IN 0.9% SODIUM CHLORIDE 5.5 MICROGRAM(S)/KG/HR: 4 INJECTION INTRAVENOUS at 19:03

## 2023-02-01 RX ADMIN — CHLORHEXIDINE GLUCONATE 15 MILLILITER(S): 213 SOLUTION TOPICAL at 18:37

## 2023-02-01 RX ADMIN — SODIUM CHLORIDE 150 MILLILITER(S): 9 INJECTION, SOLUTION INTRAVENOUS at 18:28

## 2023-02-01 RX ADMIN — HEPARIN SODIUM 9000 UNIT(S): 5000 INJECTION INTRAVENOUS; SUBCUTANEOUS at 04:04

## 2023-02-01 RX ADMIN — Medication 5.16 MICROGRAM(S)/KG/MIN: at 11:55

## 2023-02-01 RX ADMIN — VASOPRESSIN 6 UNIT(S)/MIN: 20 INJECTION INTRAVENOUS at 06:21

## 2023-02-01 RX ADMIN — Medication 6 MILLIGRAM(S): at 06:21

## 2023-02-01 RX ADMIN — Medication 25 GRAM(S): at 05:57

## 2023-02-01 RX ADMIN — Medication 100 MILLIEQUIVALENT(S): at 06:20

## 2023-02-01 RX ADMIN — DEXMEDETOMIDINE HYDROCHLORIDE IN 0.9% SODIUM CHLORIDE 5.5 MICROGRAM(S)/KG/HR: 4 INJECTION INTRAVENOUS at 11:55

## 2023-02-01 RX ADMIN — HEPARIN SODIUM 1900 UNIT(S)/HR: 5000 INJECTION INTRAVENOUS; SUBCUTANEOUS at 19:54

## 2023-02-01 RX ADMIN — FENTANYL CITRATE 5.5 MICROGRAM(S)/KG/HR: 50 INJECTION INTRAVENOUS at 19:54

## 2023-02-01 RX ADMIN — SODIUM CHLORIDE 500 MILLILITER(S): 9 INJECTION INTRAMUSCULAR; INTRAVENOUS; SUBCUTANEOUS at 07:48

## 2023-02-01 RX ADMIN — PANTOPRAZOLE SODIUM 40 MILLIGRAM(S): 20 TABLET, DELAYED RELEASE ORAL at 12:37

## 2023-02-01 RX ADMIN — SODIUM CHLORIDE 150 MILLILITER(S): 9 INJECTION, SOLUTION INTRAVENOUS at 07:48

## 2023-02-01 RX ADMIN — Medication 5.16 MICROGRAM(S)/KG/MIN: at 16:51

## 2023-02-01 RX ADMIN — Medication 5.16 MICROGRAM(S)/KG/MIN: at 09:51

## 2023-02-01 RX ADMIN — CEFTRIAXONE 100 MILLIGRAM(S): 500 INJECTION, POWDER, FOR SOLUTION INTRAMUSCULAR; INTRAVENOUS at 09:52

## 2023-02-01 RX ADMIN — FENTANYL CITRATE 5.5 MICROGRAM(S)/KG/HR: 50 INJECTION INTRAVENOUS at 00:23

## 2023-02-01 RX ADMIN — SODIUM CHLORIDE 150 MILLILITER(S): 9 INJECTION, SOLUTION INTRAVENOUS at 11:55

## 2023-02-01 RX ADMIN — HEPARIN SODIUM 1900 UNIT(S)/HR: 5000 INJECTION INTRAVENOUS; SUBCUTANEOUS at 04:04

## 2023-02-01 RX ADMIN — VASOPRESSIN 6 UNIT(S)/MIN: 20 INJECTION INTRAVENOUS at 18:15

## 2023-02-01 RX ADMIN — Medication 5.16 MICROGRAM(S)/KG/MIN: at 06:25

## 2023-02-01 NOTE — H&P ADULT - HISTORY OF PRESENT ILLNESS
73 yo female kth afib, htn, solitary kidney presented from [facility] for SOB.   As per the family, the patient tested positive for covid for days ago and she has been feeling unwell. She was also SOB but she refused to visit the hospital.   Today she was altered and her oxygen dropped to 70s in the NH, in addition to a drop in her bp.   She presented to the ed and was placed on bipap with no improvement so she was intubated.   She tested positive for covid, and her ct scan showed mild right pleural effusion.  73 yo female kth afib, htn, solitary kidney presented from [facility] for SOB.   As per the family, the patient tested positive for covid 4 days ago and she has been feeling unwell. She was also SOB but she refused to visit the hospital.   Today she was altered and her oxygen dropped to 70s in the NH, in addition to a drop in her bp.   She presented to the ed and was placed on bipap with no improvement so she was intubated.   She tested positive for covid, and her ct scan showed mild right pleural effusion. admitted to MICU called to evaluate

## 2023-02-01 NOTE — CONSULT NOTE ADULT - SUBJECTIVE AND OBJECTIVE BOX
NUTRITION SUPPORT TEAM  -  CONSULT NOTE     ADMISSION HPI:  71 yo female kth afib, htn, solitary kidney presented from [facility] for SOB.   As per the family, the patient tested positive for covid 4 days ago and she has been feeling unwell. She was also SOB but she refused to visit the hospital.   Today she was altered and her oxygen dropped to 70s in the NH, in addition to a drop in her bp.   She presented to the ed and was placed on bipap with no improvement so she was intubated.   She tested positive for covid, and her ct scan showed mild right pleural effusion. admitted to MICU called to evaluate (01 Feb 2023 00:39)    NUTRITION SUPPORT NOTE:  d/w residents - plan to start enteral feeding hopefully by tomorrow    REVIEW OF SYSTEMS:  Negative except as noted above.     PAST MEDICAL/SURGICAL HISTORY:   HTN (hypertension)  Diabetes mellitus  Obesity  Gastroesophageal reflux disease  Active asthma  Generalized OA  Afib  H/O hernia repair  2011 and revised in 2013  History of cholecystectomy    ALLERGIES:  No Known Allergies    VITALS:  T(F): 97.6 (02-01 @ 12:00), Max: 97.7 (02-01 @ 09:27)  HR: 60 (02-01 @ 14:30) (56 - 84)  BP: 107/59 (02-01 @ 14:30) (84/43 - 121/46)  RR: 28 (02-01 @ 14:00) (18 - 28)  SpO2: 94% (02-01 @ 14:30) (85% - 95%)    HEIGHT/WEIGHT/BMI:   Height (cm): 165.1 (02-01)  Weight (kg): 110 (01-31)  BMI (kg/m2): 40.4 (02-01)    I/Os:     PHYSICAL EXAM:   GENERAL: intubated, sedated, on 2 pressors  HEENT: Moist mucous membranes, +OG Beltrami  ABDOMEN: Soft, Nondistended, obese  EXTREMITIES:  No clubbing, cyanosis, or edema  SKIN: warm and well perfused; No obvious rashes or lesions  10 x 10 cm sacral early DTI  right femoral TLC, iv in L hand and ac    STANDING MEDICATIONS:   cefTRIAXone   IVPB 1000 milliGRAM(s) IV Intermittent every 24 hours  chlorhexidine 0.12% Liquid 15 milliLiter(s) Oral Mucosa every 12 hours  dexAMETHasone  Injectable 6 milliGRAM(s) IV Push daily  dexMEDEtomidine Infusion 0.2 MICROgram(s)/kG/Hr IV Continuous <Continuous>  dextrose 5% 1000 milliLiter(s) IV Continuous <Continuous>  fentaNYL   Infusion. 0.5 MICROgram(s)/kG/Hr IV Continuous <Continuous>  heparin  Infusion.  Unit(s)/Hr IV Continuous <Continuous>  levoFLOXacin IVPB 500 milliGRAM(s) IV Intermittent every 48 hours  norepinephrine Infusion 0.05 MICROgram(s)/kG/Min IV Continuous <Continuous>  pantoprazole  Injectable 40 milliGRAM(s) IV Push daily  polyethylene glycol 3350 17 Gram(s) Oral daily  senna 2 Tablet(s) Oral at bedtime  sodium bicarbonate  Injectable 100 milliEquivalent(s) IV Push once  vasopressin Infusion 0.04 Unit(s)/Min IV Continuous <Continuous>    Mode: AC/ CMV (Assist Control/ Continuous Mandatory Ventilation)  RR (machine): 20  TV (machine): 400  FiO2: 80  PEEP: 16  ITime: 1  MAP: 18  PIP: 37  ABG - ( 01 Feb 2023 11:31 )  pH, Arterial: 7.15  pH, Blood: x     /  pCO2: 50    /  pO2: 65    / HCO3: 17    / Base Excess: -11.4 /  SaO2: 90.8      LABS:                         12.0   32.72 )-----------( 316      ( 01 Feb 2023 10:10 )             35.0     126<L>  |  87<L>  |  75<HH>  ----------------------------<  340<H>          (02-01-23 @ 10:10)  3.8   |  17  |  5.3<HH>    Ca    7.3<L>          (02-01-23 @ 10:10)  Mg     1.9         (02-01-23 @ 05:55)    TPro  5.4<L>  /  Alb  3.1<L>  /  TBili  0.5  /  DBili  x   /  AST  24  /  ALT  47<H>  /  AlkPhos  266<H>       02-01-23 @ 05:55    Blood Glucose (Past 24 hours):  260 mg/dL (02-01 @ 07:56)      DIET: npo    RADIOLOGY:   < from: Xray Chest 1 View-PORTABLE IMMEDIATE (Xray Chest 1 View-PORTABLE IMMEDIATE .) (02.01.23 @ 06:31) >  Support devices: Telemetry leads are seen. The patient is intubated.    Cardiac/mediastinum/hilum: Cardiomegaly    Lung parenchyma/Pleura: Bilateral opacifications and effusions.    < end of copied text >  < from: CT Chest No Cont (01.31.23 @ 21:17) >  LUNGS, PLEURA, AIRWAYS: Mild right pleural effusion with adjacent   atelectasis. Bibasilar atelectatic changes versus scarring. Partial   obstruction of the distal right lower lobe bronchioles. Otherwise no   evidence of central endobronchial obstruction. No focal consolidation.   Right upper lobe pulmonary cyst (4-130). No pleural effusion. No   pneumothorax.    THORACIC NODES: Left periaortic lymph node measuring up to 1.1 cm   (4-127). No supraclavicular or axillary lymphadenopathy.    MEDIASTINUM/GREAT VESSELS: Cardiomegaly. No pericardial effusion. Dilated   main pulmonary artery measures up to 3.6 cm. The aorta is of normal   caliber. There is apparent thickening of the esophageal wall. If   indicated, endoscopy may be considered.    PERITONEUM/MESENTERY/BOWEL: Redemonstrated anterior abdominal wall   diastasis, which contains bowel and part of liver. No bowel obstruction.   No pneumoperitoneum.    < end of copied text >

## 2023-02-01 NOTE — H&P ADULT - NSHPLABSRESULTS_GEN_ALL_CORE
LABS:                          10.3   12.42 )-----------( 123      ( 2023 19:04 )             30.7     Hb Trend: 10.3<--  WBC Trend: 12.42<--  Plt Trend: 123<--              122<L>  |  85<L>  |  66<HH>  ----------------------------<  113<H>  3.4<L>   |  18  |  5.1<HH>    Ca    7.8<L>      2023 19:04  Mg     1.4         TPro  6.1  /  Alb  3.5  /  TBili  0.7  /  DBili  x   /  AST  24  /  ALT  59<H>  /  AlkPhos  283<H>      Troponin T, Serum: 0.12 ng/mL *HH* (23 @ 19:04)      Urinalysis Basic - ( 2023 20:15 )    Color: Orange / Appearance: Turbid / S.012 / pH: x  Gluc: x / Ketone: Negative  / Bili: Negative / Urobili: <2 mg/dL   Blood: x / Protein: 300 mg/dL / Nitrite: Negative   Leuk Esterase: Large / RBC: 6 /HPF / WBC >720 /HPF   Sq Epi: x / Non Sq Epi: >27 /HPF / Bacteria: Many      CAPILLARY BLOOD GLUCOSE              IMAGING:

## 2023-02-01 NOTE — CONSULT NOTE ADULT - SUBJECTIVE AND OBJECTIVE BOX
DARCIEPATRICIA  72y, Female  Allergy: No Known Allergies      CHIEF COMPLAINT: respiratory distress (2023 00:39)      HPI:  71 yo female kth afib, htn, solitary kidney presented from [facility] for SOB.   As per the family, the patient tested positive for covid 4 days ago and she has been feeling unwell. She was also SOB but she refused to visit the hospital.   Today she was altered and her oxygen dropped to 70s in the NH, in addition to a drop in her bp.   She presented to the ed and was placed on bipap with no improvement so she was intubated.   She tested positive for covid, and her ct scan showed mild right pleural effusion. admitted to MICU called to evaluate (2023 00:39)      PAST MEDICAL & SURGICAL HISTORY:  HTN (hypertension)  Diabetes mellitus  Obesity  Gastroesophageal reflux disease  Active asthma  Generalized OA  Afib  H/O hernia repair   and revised in   History of cholecystectomy      ROS  Not preformed     VITALS:  T(F): 97.7, Max: 98.2 (23 @ 18:24)  HR: 70  BP: 96/56  RR: 28Vital Signs Last 24 Hrs  T(C): 36.5 (2023 09:27), Max: 36.8 (2023 18:24)  T(F): 97.7 (2023 09:27), Max: 98.2 (2023 18:24)  HR: 70 (2023 12:00) (48 - 662)  BP: 96/56 (2023 12:00) (73/44 - 117/58)  BP(mean): 81 (2023 12:00) (50 - 84)  RR: 28 (2023 12:00) (18 - 28)  SpO2: 91% (2023 12:00) (85% - 100%)    Parameters below as of 2023 09:27  Patient On (Oxygen Delivery Method): ventilator    O2 Concentration (%): 100    PHYSICAL EXAM:  Gen: NAD, resting in bed  HEENT: Normocephalic, atraumatic  Neck: supple, no lymphadenopathy  CV: Regular rate & regular rhythm  Lungs: decreased BS at bases  Abdomen: Soft, BS present  Ext: Warm, well perfused  Neuro: non focal, awake  Skin: no rash, no erythema    TESTS & MEASUREMENTS:                        12.0   29.21 )-----------( 230      ( 2023 05:55 )             34.9         125<L>  |  88<L>  |  73<HH>  ----------------------------<  212<H>  4.0   |  13<L>  |  5.4<HH>    Ca    7.6<L>      2023 05:55  Mg     1.9         TPro  5.4<L>  /  Alb  3.1<L>  /  TBili  0.5  /  DBili  x   /  AST  24  /  ALT  47<H>  /  AlkPhos  266<H>        LIVER FUNCTIONS - ( 2023 05:55 )  Alb: 3.1 g/dL / Pro: 5.4 g/dL / ALK PHOS: 266 U/L / ALT: 47 U/L / AST: 24 U/L / GGT: x           Urinalysis Basic - ( 2023 20:15 )    Color: Orange / Appearance: Turbid / S.012 / pH: x  Gluc: x / Ketone: Negative  / Bili: Negative / Urobili: <2 mg/dL   Blood: x / Protein: 300 mg/dL / Nitrite: Negative   Leuk Esterase: Large / RBC: 6 /HPF / WBC >720 /HPF   Sq Epi: x / Non Sq Epi: >27 /HPF / Bacteria: Many      Lactate, Blood: 1.5 mmol/L (23 @ 10:42)  Lactate, Blood: 1.0 mmol/L (23 @ 19:04)  Blood Gas Venous - Lactate: 1.10 mmol/L (23 @ 18:21)      INFECTIOUS DISEASES TESTING  MRSA PCR Result.: Negative (23 @ 10:42)      RADIOLOGY & ADDITIONAL TESTS:  I have personally reviewed the last Chest xray  CXR  Xray Chest 1 View- PORTABLE-Urgent:   ACC: [id number] EXAM:  XR CHEST PORTABLE URGENT 1V   ORDERED BY: RAHUL HUTTON     PROCEDURE DATE:  2023          INTERPRETATION:  Clinical History / Reason for exam: Respiratory failure    Comparison : Chest radiograph 3 1/2 hours prior.    Technique/Positioning: Frontal portable, low lung volumes.    Findings:    Support devices: Telemetry leads are seen. Endotracheal tube is above the   tor.    Cardiac/mediastinum/hilum: Cardiomegaly    Lung parenchyma/Pleura: Improved opacities    Skeleton/soft tissues: Unchanged    Impression:    Cardiomegaly. Improved CHF.    Support devices as described.        --- End of Report ---        MARISEL VACA MD; Attending Interventional Radiologist  This document has been electronically signed. 2023  7:44AM (23 @ 00:39)  Xray Chest 1 View- PORTABLE-Urgent:   ACC: [id number] EXAM:  XR CHEST PORTABLE URGENT 1V   ORDERED BY: MYA GONZALEZ     PROCEDURE DATE:  2023        INTERPRETATION:  Clinical History / Reason for exam: Shortness of breath    Comparison : Chest radiograph 2020.    Technique/Positioning: Frontal portable.    Findings:    Support devices: Telemetry leads are seen    Cardiac/mediastinum/hilum: Cardiomegaly.    Lung parenchyma/Pleura: Bilateral effusions with perihilar opacities    Skeleton/soft tissues: Unchanged    Impression:    CHF, significantly worsening.        --- End of Report ---        MARISEL VACA MD; Attending Interventional Radiologist  This document has been electronically signed. 2023  7:35AM (23 @ 20:56)      CT  CT Abdomen and Pelvis No Cont:   ACC: [id number] EXAM:  CT ABDOMEN AND PELVIS   ORDERED BY: RAHUL HUTTON     ACC: [id number] EXAM:  CT CHEST   ORDERED BY: RAHUL HUTTON     PROCEDURE DATE:  2023          INTERPRETATION:  CLINICAL HISTORY/REASON FOR EXAM: Shortness of breath.    TECHNIQUE: Contiguous axial CT images were obtained from the thoracic   inlet to the pubic symphysis without intravenous contrast. Oral contrast   was not administered. Reformatted images in the coronal and sagittal   planes were acquired. 3D (MIP) images obtained. There is breathing motion   artifact. There is photon starvation artifact. Truncation artifact which   limits evaluation of peripheral soft tissues.    COMPARISON: CT abdomen and pelvis 10/1/2021.      FINDINGS:    CHEST:    LUNGS, PLEURA, AIRWAYS: Mild right pleural effusion with adjacent   atelectasis. Bibasilar atelectatic changes versus scarring. Partial   obstruction of the distal right lower lobe bronchioles. Otherwise no   evidence of central endobronchial obstruction. No focal consolidation.   Right upper lobe pulmonary cyst (4-130). No pleural effusion. No   pneumothorax.    THORACIC NODES: Left periaortic lymph node measuring up to 1.1 cm   (4-127). No supraclavicular or axillary lymphadenopathy.    MEDIASTINUM/GREAT VESSELS: Cardiomegaly. No pericardial effusion. Dilated   main pulmonary artery measures up to 3.6 cm. The aorta is of normal   caliber. There is apparent thickening of the esophageal wall. If   indicated, endoscopy may be considered.    ABDOMEN/PELVIS:    HEPATOBILIARY: Cholecystectomy. Unremarkable CT appearance of the liver.    SPLEEN: Unremarkable.    PANCREAS: Unremarkable.    ADRENAL GLANDS: Redemonstrated 2.4 cm left adrenal gland nodule.    KIDNEYS: Redemonstrated hypertrophic right kidney, congenitally absent   left kidney.    ABDOMINOPELVIC NODES: Unremarkable.    PELVIC ORGANS: Redemonstrated calcified uterine fibroid.    PERITONEUM/MESENTERY/BOWEL: Redemonstrated anterior abdominal wall   diastasis, which contains bowel and part of liver. No bowel obstruction.   No pneumoperitoneum.    BONES/SOFT TISSUES: Degenerative changes of the spine. Peripheral soft   tissues are excluded from field-of-view.    OTHER: Atherosclerotic vascular calcifications. Right femoral venous   catheter.      IMPRESSION:    On this breathing motion, quantum mottle, and truncation artifact limited   examination:    Mild right pleural effusion.    Dilated main pulmonary artery, which can be seen in pulmonary   hypertension.    Chronic findings as above.    --- End of Report ---          YARELI WRIGHT MD; Resident Radiologist  This document has been electronically signed.  REINA CARTER MD; Attending Interventional Radiologist  This document has been electronically signed. 2023 10:44PM (23 @ 21:17)  CT Chest No Cont:   ACC: [id number] EXAM:  CT ABDOMEN AND PELVIS   ORDERED BY: RAHUL HUTTON     ACC: [id number] EXAM:  CT CHEST   ORDERED BY: RAHUL HUTTON     PROCEDURE DATE:  2023          INTERPRETATION:  CLINICAL HISTORY/REASON FOR EXAM: Shortness of breath.    TECHNIQUE: Contiguous axial CT images were obtained from the thoracic   inlet to the pubic symphysis without intravenous contrast. Oral contrast   was not administered. Reformatted images in the coronal and sagittal   planes were acquired. 3D (MIP) images obtained. There is breathing motion   artifact. There is photon starvation artifact. Truncation artifact which   limits evaluation of peripheral soft tissues.    COMPARISON: CT abdomen and pelvis 10/1/2021.      FINDINGS:    CHEST:    LUNGS, PLEURA, AIRWAYS: Mild right pleural effusion with adjacent   atelectasis. Bibasilar atelectatic changes versus scarring. Partial   obstruction of the distal right lower lobe bronchioles. Otherwise no   evidence of central endobronchial obstruction. No focal consolidation.   Right upper lobe pulmonary cyst (4-130). No pleural effusion. No   pneumothorax.    THORACIC NODES: Left periaortic lymph node measuring up to 1.1 cm   (4-127). No supraclavicular or axillary lymphadenopathy.    MEDIASTINUM/GREAT VESSELS: Cardiomegaly. No pericardial effusion. Dilated   main pulmonary artery measures up to 3.6 cm. The aorta is of normal   caliber. There is apparent thickening of the esophageal wall. If   indicated, endoscopy may be considered.    ABDOMEN/PELVIS:    HEPATOBILIARY: Cholecystectomy. Unremarkable CT appearance of the liver.    SPLEEN: Unremarkable.    PANCREAS: Unremarkable.    ADRENAL GLANDS: Redemonstrated 2.4 cm left adrenal gland nodule.    KIDNEYS: Redemonstrated hypertrophic right kidney, congenitally absent   left kidney.    ABDOMINOPELVIC NODES: Unremarkable.    PELVIC ORGANS: Redemonstrated calcified uterine fibroid.    PERITONEUM/MESENTERY/BOWEL: Redemonstrated anterior abdominal wall   diastasis, which contains bowel and part of liver. No bowel obstruction.   No pneumoperitoneum.    BONES/SOFT TISSUES: Degenerative changes of the spine. Peripheral soft   tissues are excluded from field-of-view.    OTHER: Atherosclerotic vascular calcifications. Right femoral venous   catheter.      IMPRESSION:    On this breathing motion, quantum mottle, and truncation artifact limited   examination:    Mild right pleural effusion.    Dilated main pulmonary artery, which can be seen in pulmonary   hypertension.    Chronic findings as above.    --- End of Report ---          YARELI WRIGHT MD; Resident Radiologist  This document has been electronically signed.  REINA CARTER MD; Attending Interventional Radiologist  This document has been electronically signed. 2023 10:44PM (23 @ 21:17)      CARDIOLOGY TESTING  12 Lead ECG:   Ventricular Rate 59 BPM    QRS Duration 118 ms    Q-T Interval 448 ms    QTC Calculation(Bazett) 443 ms    R Axis 86 degrees    T Axis 19 degrees    Diagnosis Line Atrial fibrillation with slow ventricular response  Low voltage QRS    Abnormal ECG    Confirmed by MAI RHOADES MD ([id number]) on 2023 7:24:08 AM (23 @ 20:19)      MEDICATIONS  cefTRIAXone   IVPB 1000  chlorhexidine 0.12% Liquid 15  dexAMETHasone  Injectable 6  dexMEDEtomidine Infusion 0.2  dextrose 5% 1000  fentaNYL   Infusion. 0.5  heparin  Infusion.   levoFLOXacin IVPB 500  norepinephrine Infusion 0.05  pantoprazole  Injectable 40  sodium bicarbonate  Injectable 100  vasopressin Infusion 0.04      ANTIBIOTICS:  cefTRIAXone   IVPB 1000 milliGRAM(s) IV Intermittent every 24 hours  levoFLOXacin IVPB 500 milliGRAM(s) IV Intermittent every 48 hours        ASSESSMENT  72y F admitted with ACUTE RESPIRATORY FAILURE WITH HYPOXIA; PLEURAL EFFUSION with a pmh of HTN (hypertension), Diabetes mellitus, Obesity, Gastroesophageal reflux disease, Active asthma, Generalized OA, Afib.  As per the family, the patient tested positive for covid 4 days ago and she has been feeling unwell. she lives in [facility] Today she was altered and her oxygen dropped to 70s in the NH, in addition to a drop in her bp.   She presented to the ed and was placed on bipap with no improvement so she was intubated.   She tested positive for covid, and her ct scan showed mild right pleural effusion. admitted to MICU  Pt on ventilator   Ground glass opacities seen on CT  WBC count is 29  Covid +  UA showed LE+, WBC+, Bacteria+, Epithelial cells 27+      PROBLEMS  Pt with Positive covid test  Positive Uranalysis/ possible cystitis    RECOMMENDATIONS:  # Possible cystitis  - f/u pending cultures  - f/u MRSA nares  - Continue Ceftriaxone 1g QD  - Continue Levofloxacin 500   - order urine legionella     # Covid  - Continue Dexamethasone   - start remdesivir 5 Days.   - No tocilizumab at this time, f/u cultures, if all are negative may consider Tocilizumab     DARCIEPATRICIA  72y, Female  Allergy: No Known Allergies      CHIEF COMPLAINT: respiratory distress (2023 00:39)      HPI:  71 yo female kth afib, htn, solitary kidney presented from [facility] for SOB.   As per the family, the patient tested positive for covid 4 days ago and she has been feeling unwell. She was also SOB but she refused to visit the hospital.   Today she was altered and her oxygen dropped to 70s in the NH, in addition to a drop in her bp.   She presented to the ed and was placed on bipap with no improvement so she was intubated.   She tested positive for covid, and her ct scan showed mild right pleural effusion. admitted to MICU called to evaluate (2023 00:39)      PAST MEDICAL & SURGICAL HISTORY:  HTN (hypertension)  Diabetes mellitus  Obesity  Gastroesophageal reflux disease  Active asthma  Generalized OA  Afib  H/O hernia repair   and revised in   History of cholecystectomy      ROS  Not preformed     VITALS:  T(F): 97.7, Max: 98.2 (23 @ 18:24)  HR: 70  BP: 96/56  RR: 28Vital Signs Last 24 Hrs  T(C): 36.5 (2023 09:27), Max: 36.8 (2023 18:24)  T(F): 97.7 (2023 09:27), Max: 98.2 (2023 18:24)  HR: 70 (2023 12:00) (48 - 662)  BP: 96/56 (2023 12:00) (73/44 - 117/58)  BP(mean): 81 (2023 12:00) (50 - 84)  RR: 28 (2023 12:00) (18 - 28)  SpO2: 91% (2023 12:00) (85% - 100%)    Parameters below as of 2023 09:27  Patient On (Oxygen Delivery Method): ventilator    O2 Concentration (%): 100    PHYSICAL EXAM:  Gen: NAD, resting in bed  HEENT: Normocephalic, atraumatic  Neck: supple, no lymphadenopathy  CV: Regular rate & regular rhythm  Lungs: decreased BS at bases  Abdomen: Soft, BS present  Ext: Warm, well perfused  Neuro: non focal, awake  Skin: no rash, no erythema    TESTS & MEASUREMENTS:                        12.0   29.21 )-----------( 230      ( 2023 05:55 )             34.9         125<L>  |  88<L>  |  73<HH>  ----------------------------<  212<H>  4.0   |  13<L>  |  5.4<HH>    Ca    7.6<L>      2023 05:55  Mg     1.9         TPro  5.4<L>  /  Alb  3.1<L>  /  TBili  0.5  /  DBili  x   /  AST  24  /  ALT  47<H>  /  AlkPhos  266<H>        LIVER FUNCTIONS - ( 2023 05:55 )  Alb: 3.1 g/dL / Pro: 5.4 g/dL / ALK PHOS: 266 U/L / ALT: 47 U/L / AST: 24 U/L / GGT: x           Urinalysis Basic - ( 2023 20:15 )    Color: Orange / Appearance: Turbid / S.012 / pH: x  Gluc: x / Ketone: Negative  / Bili: Negative / Urobili: <2 mg/dL   Blood: x / Protein: 300 mg/dL / Nitrite: Negative   Leuk Esterase: Large / RBC: 6 /HPF / WBC >720 /HPF   Sq Epi: x / Non Sq Epi: >27 /HPF / Bacteria: Many      Lactate, Blood: 1.5 mmol/L (23 @ 10:42)  Lactate, Blood: 1.0 mmol/L (23 @ 19:04)  Blood Gas Venous - Lactate: 1.10 mmol/L (23 @ 18:21)      INFECTIOUS DISEASES TESTING  MRSA PCR Result.: Negative (23 @ 10:42)      RADIOLOGY & ADDITIONAL TESTS:  I have personally reviewed the last Chest xray  CXR  Xray Chest 1 View- PORTABLE-Urgent:   ACC: [id number] EXAM:  XR CHEST PORTABLE URGENT 1V   ORDERED BY: RAHUL HUTTON     PROCEDURE DATE:  2023          INTERPRETATION:  Clinical History / Reason for exam: Respiratory failure    Comparison : Chest radiograph 3 1/2 hours prior.    Technique/Positioning: Frontal portable, low lung volumes.    Findings:    Support devices: Telemetry leads are seen. Endotracheal tube is above the   tor.    Cardiac/mediastinum/hilum: Cardiomegaly    Lung parenchyma/Pleura: Improved opacities    Skeleton/soft tissues: Unchanged    Impression:    Cardiomegaly. Improved CHF.    Support devices as described.        --- End of Report ---        MARISEL VACA MD; Attending Interventional Radiologist  This document has been electronically signed. 2023  7:44AM (23 @ 00:39)  Xray Chest 1 View- PORTABLE-Urgent:   ACC: [id number] EXAM:  XR CHEST PORTABLE URGENT 1V   ORDERED BY: MYA GONZALEZ     PROCEDURE DATE:  2023        INTERPRETATION:  Clinical History / Reason for exam: Shortness of breath    Comparison : Chest radiograph 2020.    Technique/Positioning: Frontal portable.    Findings:    Support devices: Telemetry leads are seen    Cardiac/mediastinum/hilum: Cardiomegaly.    Lung parenchyma/Pleura: Bilateral effusions with perihilar opacities    Skeleton/soft tissues: Unchanged    Impression:    CHF, significantly worsening.        --- End of Report ---        MARISEL VACA MD; Attending Interventional Radiologist  This document has been electronically signed. 2023  7:35AM (23 @ 20:56)      CT  CT Abdomen and Pelvis No Cont:   ACC: [id number] EXAM:  CT ABDOMEN AND PELVIS   ORDERED BY: RAHUL HUTTON     ACC: [id number] EXAM:  CT CHEST   ORDERED BY: RAHUL HUTTON     PROCEDURE DATE:  2023          INTERPRETATION:  CLINICAL HISTORY/REASON FOR EXAM: Shortness of breath.    TECHNIQUE: Contiguous axial CT images were obtained from the thoracic   inlet to the pubic symphysis without intravenous contrast. Oral contrast   was not administered. Reformatted images in the coronal and sagittal   planes were acquired. 3D (MIP) images obtained. There is breathing motion   artifact. There is photon starvation artifact. Truncation artifact which   limits evaluation of peripheral soft tissues.    COMPARISON: CT abdomen and pelvis 10/1/2021.      FINDINGS:    CHEST:    LUNGS, PLEURA, AIRWAYS: Mild right pleural effusion with adjacent   atelectasis. Bibasilar atelectatic changes versus scarring. Partial   obstruction of the distal right lower lobe bronchioles. Otherwise no   evidence of central endobronchial obstruction. No focal consolidation.   Right upper lobe pulmonary cyst (4-130). No pleural effusion. No   pneumothorax.    THORACIC NODES: Left periaortic lymph node measuring up to 1.1 cm   (4-127). No supraclavicular or axillary lymphadenopathy.    MEDIASTINUM/GREAT VESSELS: Cardiomegaly. No pericardial effusion. Dilated   main pulmonary artery measures up to 3.6 cm. The aorta is of normal   caliber. There is apparent thickening of the esophageal wall. If   indicated, endoscopy may be considered.    ABDOMEN/PELVIS:    HEPATOBILIARY: Cholecystectomy. Unremarkable CT appearance of the liver.    SPLEEN: Unremarkable.    PANCREAS: Unremarkable.    ADRENAL GLANDS: Redemonstrated 2.4 cm left adrenal gland nodule.    KIDNEYS: Redemonstrated hypertrophic right kidney, congenitally absent   left kidney.    ABDOMINOPELVIC NODES: Unremarkable.    PELVIC ORGANS: Redemonstrated calcified uterine fibroid.    PERITONEUM/MESENTERY/BOWEL: Redemonstrated anterior abdominal wall   diastasis, which contains bowel and part of liver. No bowel obstruction.   No pneumoperitoneum.    BONES/SOFT TISSUES: Degenerative changes of the spine. Peripheral soft   tissues are excluded from field-of-view.    OTHER: Atherosclerotic vascular calcifications. Right femoral venous   catheter.      IMPRESSION:    On this breathing motion, quantum mottle, and truncation artifact limited   examination:    Mild right pleural effusion.    Dilated main pulmonary artery, which can be seen in pulmonary   hypertension.    Chronic findings as above.    --- End of Report ---          YARELI WRIGHT MD; Resident Radiologist  This document has been electronically signed.  REINA CARTER MD; Attending Interventional Radiologist  This document has been electronically signed. 2023 10:44PM (23 @ 21:17)  CT Chest No Cont:   ACC: [id number] EXAM:  CT ABDOMEN AND PELVIS   ORDERED BY: RAHUL HUTTON     ACC: [id number] EXAM:  CT CHEST   ORDERED BY: RAHUL HUTTON     PROCEDURE DATE:  2023          INTERPRETATION:  CLINICAL HISTORY/REASON FOR EXAM: Shortness of breath.    TECHNIQUE: Contiguous axial CT images were obtained from the thoracic   inlet to the pubic symphysis without intravenous contrast. Oral contrast   was not administered. Reformatted images in the coronal and sagittal   planes were acquired. 3D (MIP) images obtained. There is breathing motion   artifact. There is photon starvation artifact. Truncation artifact which   limits evaluation of peripheral soft tissues.    COMPARISON: CT abdomen and pelvis 10/1/2021.      FINDINGS:    CHEST:    LUNGS, PLEURA, AIRWAYS: Mild right pleural effusion with adjacent   atelectasis. Bibasilar atelectatic changes versus scarring. Partial   obstruction of the distal right lower lobe bronchioles. Otherwise no   evidence of central endobronchial obstruction. No focal consolidation.   Right upper lobe pulmonary cyst (4-130). No pleural effusion. No   pneumothorax.    THORACIC NODES: Left periaortic lymph node measuring up to 1.1 cm   (4-127). No supraclavicular or axillary lymphadenopathy.    MEDIASTINUM/GREAT VESSELS: Cardiomegaly. No pericardial effusion. Dilated   main pulmonary artery measures up to 3.6 cm. The aorta is of normal   caliber. There is apparent thickening of the esophageal wall. If   indicated, endoscopy may be considered.    ABDOMEN/PELVIS:    HEPATOBILIARY: Cholecystectomy. Unremarkable CT appearance of the liver.    SPLEEN: Unremarkable.    PANCREAS: Unremarkable.    ADRENAL GLANDS: Redemonstrated 2.4 cm left adrenal gland nodule.    KIDNEYS: Redemonstrated hypertrophic right kidney, congenitally absent   left kidney.    ABDOMINOPELVIC NODES: Unremarkable.    PELVIC ORGANS: Redemonstrated calcified uterine fibroid.    PERITONEUM/MESENTERY/BOWEL: Redemonstrated anterior abdominal wall   diastasis, which contains bowel and part of liver. No bowel obstruction.   No pneumoperitoneum.    BONES/SOFT TISSUES: Degenerative changes of the spine. Peripheral soft   tissues are excluded from field-of-view.    OTHER: Atherosclerotic vascular calcifications. Right femoral venous   catheter.      IMPRESSION:    On this breathing motion, quantum mottle, and truncation artifact limited   examination:    Mild right pleural effusion.    Dilated main pulmonary artery, which can be seen in pulmonary   hypertension.    Chronic findings as above.    --- End of Report ---          YARELI WRIGHT MD; Resident Radiologist  This document has been electronically signed.  REINA CARTER MD; Attending Interventional Radiologist  This document has been electronically signed. 2023 10:44PM (23 @ 21:17)      CARDIOLOGY TESTING  12 Lead ECG:   Ventricular Rate 59 BPM    QRS Duration 118 ms    Q-T Interval 448 ms    QTC Calculation(Bazett) 443 ms    R Axis 86 degrees    T Axis 19 degrees    Diagnosis Line Atrial fibrillation with slow ventricular response  Low voltage QRS    Abnormal ECG    Confirmed by MAI RHOADES MD ([id number]) on 2023 7:24:08 AM (23 @ 20:19)      MEDICATIONS  cefTRIAXone   IVPB 1000  chlorhexidine 0.12% Liquid 15  dexAMETHasone  Injectable 6  dexMEDEtomidine Infusion 0.2  dextrose 5% 1000  fentaNYL   Infusion. 0.5  heparin  Infusion.   levoFLOXacin IVPB 500  norepinephrine Infusion 0.05  pantoprazole  Injectable 40  sodium bicarbonate  Injectable 100  vasopressin Infusion 0.04      ANTIBIOTICS:  cefTRIAXone   IVPB 1000 milliGRAM(s) IV Intermittent every 24 hours  levoFLOXacin IVPB 500 milliGRAM(s) IV Intermittent every 48 hours        ASSESSMENT  72y F admitted with ACUTE RESPIRATORY FAILURE WITH HYPOXIA; PLEURAL EFFUSION with a pmh of HTN (hypertension), Diabetes mellitus, Obesity, Gastroesophageal reflux disease, Active asthma, Generalized OA, Afib.  As per the family, the patient tested positive for covid 4 days ago and she has been feeling unwell. she lives in [facility] Today she was altered and her oxygen dropped to 70s in the NH, in addition to a drop in her bp.   She presented to the ed and was placed on bipap with no improvement so she was intubated.   She tested positive for covid, and her ct scan showed mild right pleural effusion. admitted to MICU  Pt on ventilator   Ground glass opacities seen on CT  WBC count is 29  Covid +  UA showed LE+, WBC+, Bacteria+, Epithelial cells 27+      PROBLEMS  Pt with Positive covid test  Positive Uranalysis/ possible cystitis    RECOMMENDATIONS:  - Start Vancomycin PO, 125 Q12 for prophylaxis for Cdiff as she has a history of Cdiff    # Possible cystitis  - f/u pending cultures  - f/u MRSA nares  - Continue Ceftriaxone 1g QD  - Continue Levofloxacin 500   - order urine legionella     # Covid  - Continue Dexamethasone   - start remdesivir 5 Days.   - No tocilizumab at this time, f/u cultures, if all are negative may consider Tocilizumab     DARCIEPATRICIA  72y, Female  Allergy: No Known Allergies      CHIEF COMPLAINT: respiratory distress (2023 00:39)      HPI:  71 yo female kth afib, htn, solitary kidney presented from [facility] for SOB.   As per the family, the patient tested positive for covid 4 days ago and she has been feeling unwell. She was also SOB but she refused to visit the hospital.   Today she was altered and her oxygen dropped to 70s in the NH, in addition to a drop in her bp.   She presented to the ed and was placed on bipap with no improvement so she was intubated.   She tested positive for covid, and her ct scan showed mild right pleural effusion. admitted to MICU called to evaluate (2023 00:39)      PAST MEDICAL & SURGICAL HISTORY:  HTN (hypertension)  Diabetes mellitus  Obesity  Gastroesophageal reflux disease  Active asthma  Generalized OA  Afib  H/O hernia repair   and revised in   History of cholecystectomy      ROS  Not preformed     VITALS:  T(F): 97.7, Max: 98.2 (23 @ 18:24)  HR: 70  BP: 96/56  RR: 28Vital Signs Last 24 Hrs  T(C): 36.5 (2023 09:27), Max: 36.8 (2023 18:24)  T(F): 97.7 (2023 09:27), Max: 98.2 (2023 18:24)  HR: 70 (2023 12:00) (48 - 662)  BP: 96/56 (2023 12:00) (73/44 - 117/58)  BP(mean): 81 (2023 12:00) (50 - 84)  RR: 28 (2023 12:00) (18 - 28)  SpO2: 91% (2023 12:00) (85% - 100%)    Parameters below as of 2023 09:27  Patient On (Oxygen Delivery Method): ventilator    O2 Concentration (%): 100    PHYSICAL EXAM:  Gen: NAD, resting in bed  HEENT: Normocephalic, atraumatic  Neck: supple, no lymphadenopathy  CV: Regular rate & regular rhythm  Lungs: decreased BS at bases  Abdomen: Soft, BS present  Ext: Warm, well perfused  Neuro: non focal, awake  Skin: no rash, no erythema    TESTS & MEASUREMENTS:                        12.0   29.21 )-----------( 230      ( 2023 05:55 )             34.9         125<L>  |  88<L>  |  73<HH>  ----------------------------<  212<H>  4.0   |  13<L>  |  5.4<HH>    Ca    7.6<L>      2023 05:55  Mg     1.9         TPro  5.4<L>  /  Alb  3.1<L>  /  TBili  0.5  /  DBili  x   /  AST  24  /  ALT  47<H>  /  AlkPhos  266<H>        LIVER FUNCTIONS - ( 2023 05:55 )  Alb: 3.1 g/dL / Pro: 5.4 g/dL / ALK PHOS: 266 U/L / ALT: 47 U/L / AST: 24 U/L / GGT: x           Urinalysis Basic - ( 2023 20:15 )    Color: Orange / Appearance: Turbid / S.012 / pH: x  Gluc: x / Ketone: Negative  / Bili: Negative / Urobili: <2 mg/dL   Blood: x / Protein: 300 mg/dL / Nitrite: Negative   Leuk Esterase: Large / RBC: 6 /HPF / WBC >720 /HPF   Sq Epi: x / Non Sq Epi: >27 /HPF / Bacteria: Many      Lactate, Blood: 1.5 mmol/L (23 @ 10:42)  Lactate, Blood: 1.0 mmol/L (23 @ 19:04)  Blood Gas Venous - Lactate: 1.10 mmol/L (23 @ 18:21)      INFECTIOUS DISEASES TESTING  MRSA PCR Result.: Negative (23 @ 10:42)      RADIOLOGY & ADDITIONAL TESTS:  I have personally reviewed the last Chest xray  CXR  Xray Chest 1 View- PORTABLE-Urgent:   ACC: [id number] EXAM:  XR CHEST PORTABLE URGENT 1V   ORDERED BY: RAHUL HUTTON     PROCEDURE DATE:  2023          INTERPRETATION:  Clinical History / Reason for exam: Respiratory failure    Comparison : Chest radiograph 3 1/2 hours prior.    Technique/Positioning: Frontal portable, low lung volumes.    Findings:    Support devices: Telemetry leads are seen. Endotracheal tube is above the   tor.    Cardiac/mediastinum/hilum: Cardiomegaly    Lung parenchyma/Pleura: Improved opacities    Skeleton/soft tissues: Unchanged    Impression:    Cardiomegaly. Improved CHF.    Support devices as described.        --- End of Report ---        MARISEL VACA MD; Attending Interventional Radiologist  This document has been electronically signed. 2023  7:44AM (23 @ 00:39)  Xray Chest 1 View- PORTABLE-Urgent:   ACC: [id number] EXAM:  XR CHEST PORTABLE URGENT 1V   ORDERED BY: MYA GONZALEZ     PROCEDURE DATE:  2023        INTERPRETATION:  Clinical History / Reason for exam: Shortness of breath    Comparison : Chest radiograph 2020.    Technique/Positioning: Frontal portable.    Findings:    Support devices: Telemetry leads are seen    Cardiac/mediastinum/hilum: Cardiomegaly.    Lung parenchyma/Pleura: Bilateral effusions with perihilar opacities    Skeleton/soft tissues: Unchanged    Impression:    CHF, significantly worsening.        --- End of Report ---        MARISEL VACA MD; Attending Interventional Radiologist  This document has been electronically signed. 2023  7:35AM (23 @ 20:56)      CT  CT Abdomen and Pelvis No Cont:   ACC: [id number] EXAM:  CT ABDOMEN AND PELVIS   ORDERED BY: RAHUL HUTTON     ACC: [id number] EXAM:  CT CHEST   ORDERED BY: RAHUL HUTTON     PROCEDURE DATE:  2023          INTERPRETATION:  CLINICAL HISTORY/REASON FOR EXAM: Shortness of breath.    TECHNIQUE: Contiguous axial CT images were obtained from the thoracic   inlet to the pubic symphysis without intravenous contrast. Oral contrast   was not administered. Reformatted images in the coronal and sagittal   planes were acquired. 3D (MIP) images obtained. There is breathing motion   artifact. There is photon starvation artifact. Truncation artifact which   limits evaluation of peripheral soft tissues.    COMPARISON: CT abdomen and pelvis 10/1/2021.      FINDINGS:    CHEST:    LUNGS, PLEURA, AIRWAYS: Mild right pleural effusion with adjacent   atelectasis. Bibasilar atelectatic changes versus scarring. Partial   obstruction of the distal right lower lobe bronchioles. Otherwise no   evidence of central endobronchial obstruction. No focal consolidation.   Right upper lobe pulmonary cyst (4-130). No pleural effusion. No   pneumothorax.    THORACIC NODES: Left periaortic lymph node measuring up to 1.1 cm   (4-127). No supraclavicular or axillary lymphadenopathy.    MEDIASTINUM/GREAT VESSELS: Cardiomegaly. No pericardial effusion. Dilated   main pulmonary artery measures up to 3.6 cm. The aorta is of normal   caliber. There is apparent thickening of the esophageal wall. If   indicated, endoscopy may be considered.    ABDOMEN/PELVIS:    HEPATOBILIARY: Cholecystectomy. Unremarkable CT appearance of the liver.    SPLEEN: Unremarkable.    PANCREAS: Unremarkable.    ADRENAL GLANDS: Redemonstrated 2.4 cm left adrenal gland nodule.    KIDNEYS: Redemonstrated hypertrophic right kidney, congenitally absent   left kidney.    ABDOMINOPELVIC NODES: Unremarkable.    PELVIC ORGANS: Redemonstrated calcified uterine fibroid.    PERITONEUM/MESENTERY/BOWEL: Redemonstrated anterior abdominal wall   diastasis, which contains bowel and part of liver. No bowel obstruction.   No pneumoperitoneum.    BONES/SOFT TISSUES: Degenerative changes of the spine. Peripheral soft   tissues are excluded from field-of-view.    OTHER: Atherosclerotic vascular calcifications. Right femoral venous   catheter.      IMPRESSION:    On this breathing motion, quantum mottle, and truncation artifact limited   examination:    Mild right pleural effusion.    Dilated main pulmonary artery, which can be seen in pulmonary   hypertension.    Chronic findings as above.    --- End of Report ---          YARELI WRIGHT MD; Resident Radiologist  This document has been electronically signed.  REINA CARTER MD; Attending Interventional Radiologist  This document has been electronically signed. 2023 10:44PM (23 @ 21:17)  CT Chest No Cont:   ACC: [id number] EXAM:  CT ABDOMEN AND PELVIS   ORDERED BY: RAHUL HUTTON     ACC: [id number] EXAM:  CT CHEST   ORDERED BY: RAHUL HUTTON     PROCEDURE DATE:  2023          INTERPRETATION:  CLINICAL HISTORY/REASON FOR EXAM: Shortness of breath.    TECHNIQUE: Contiguous axial CT images were obtained from the thoracic   inlet to the pubic symphysis without intravenous contrast. Oral contrast   was not administered. Reformatted images in the coronal and sagittal   planes were acquired. 3D (MIP) images obtained. There is breathing motion   artifact. There is photon starvation artifact. Truncation artifact which   limits evaluation of peripheral soft tissues.    COMPARISON: CT abdomen and pelvis 10/1/2021.      FINDINGS:    CHEST:    LUNGS, PLEURA, AIRWAYS: Mild right pleural effusion with adjacent   atelectasis. Bibasilar atelectatic changes versus scarring. Partial   obstruction of the distal right lower lobe bronchioles. Otherwise no   evidence of central endobronchial obstruction. No focal consolidation.   Right upper lobe pulmonary cyst (4-130). No pleural effusion. No   pneumothorax.    THORACIC NODES: Left periaortic lymph node measuring up to 1.1 cm   (4-127). No supraclavicular or axillary lymphadenopathy.    MEDIASTINUM/GREAT VESSELS: Cardiomegaly. No pericardial effusion. Dilated   main pulmonary artery measures up to 3.6 cm. The aorta is of normal   caliber. There is apparent thickening of the esophageal wall. If   indicated, endoscopy may be considered.    ABDOMEN/PELVIS:    HEPATOBILIARY: Cholecystectomy. Unremarkable CT appearance of the liver.    SPLEEN: Unremarkable.    PANCREAS: Unremarkable.    ADRENAL GLANDS: Redemonstrated 2.4 cm left adrenal gland nodule.    KIDNEYS: Redemonstrated hypertrophic right kidney, congenitally absent   left kidney.    ABDOMINOPELVIC NODES: Unremarkable.    PELVIC ORGANS: Redemonstrated calcified uterine fibroid.    PERITONEUM/MESENTERY/BOWEL: Redemonstrated anterior abdominal wall   diastasis, which contains bowel and part of liver. No bowel obstruction.   No pneumoperitoneum.    BONES/SOFT TISSUES: Degenerative changes of the spine. Peripheral soft   tissues are excluded from field-of-view.    OTHER: Atherosclerotic vascular calcifications. Right femoral venous   catheter.      IMPRESSION:    On this breathing motion, quantum mottle, and truncation artifact limited   examination:    Mild right pleural effusion.    Dilated main pulmonary artery, which can be seen in pulmonary   hypertension.    Chronic findings as above.    --- End of Report ---          YARELI WRIGHT MD; Resident Radiologist  This document has been electronically signed.  REINA CARTER MD; Attending Interventional Radiologist  This document has been electronically signed. 2023 10:44PM (23 @ 21:17)      CARDIOLOGY TESTING  12 Lead ECG:   Ventricular Rate 59 BPM    QRS Duration 118 ms    Q-T Interval 448 ms    QTC Calculation(Bazett) 443 ms    R Axis 86 degrees    T Axis 19 degrees    Diagnosis Line Atrial fibrillation with slow ventricular response  Low voltage QRS    Abnormal ECG    Confirmed by MAI RHOADES MD ([id number]) on 2023 7:24:08 AM (23 @ 20:19)      MEDICATIONS  cefTRIAXone   IVPB 1000  chlorhexidine 0.12% Liquid 15  dexAMETHasone  Injectable 6  dexMEDEtomidine Infusion 0.2  dextrose 5% 1000  fentaNYL   Infusion. 0.5  heparin  Infusion.   levoFLOXacin IVPB 500  norepinephrine Infusion 0.05  pantoprazole  Injectable 40  sodium bicarbonate  Injectable 100  vasopressin Infusion 0.04      ANTIBIOTICS:  cefTRIAXone   IVPB 1000 milliGRAM(s) IV Intermittent every 24 hours  levoFLOXacin IVPB 500 milliGRAM(s) IV Intermittent every 48 hours        ASSESSMENT  72y F admitted with ACUTE RESPIRATORY FAILURE WITH HYPOXIA; PLEURAL EFFUSION with a pmh of HTN (hypertension), Diabetes mellitus, Obesity, Gastroesophageal reflux disease, Active asthma, Generalized OA, Afib.  As per the family, the patient tested positive for covid 4 days ago and she has been feeling unwell. she lives in [facility] Today she was altered and her oxygen dropped to 70s in the NH, in addition to a drop in her bp.   She presented to the ed and was placed on bipap with no improvement so she was intubated.   She tested positive for covid, and her ct scan showed mild right pleural effusion. admitted to MICU  Pt on ventilator   Ground glass opacities seen on CT  WBC count is 29  Covid +  UA showed LE+, WBC+, Bacteria+, Epithelial cells 27+      PROBLEMS  Pt with Positive covid test  Positive Uranalysis/ possible cystitis    RECOMMENDATIONS:  - Start Vancomycin PO, 125 Q12 for prophylaxis for Cdiff as she has a history of Cdiff    # Possible cystitis  - f/u pending cultures  - f/u MRSA nares  - Continue Ceftriaxone 1g QD  - Continue Levofloxacin 500   - order urine legionella     # Covid  - Continue Dexamethasone   - No remdesivir at this time as there is no benefit, she is intubated and has an NOLA    - No tocilizumab at this time, f/u cultures, if all are negative may consider Tocilizumab     DARCIEPATRICIA  72y, Female  Allergy: No Known Allergies      CHIEF COMPLAINT: respiratory distress (2023 00:39)      HPI:  73 yo female kth afib, htn, solitary kidney presented from [facility] for SOB.   As per the family, the patient tested positive for covid 4 days ago and she has been feeling unwell. She was also SOB but she refused to visit the hospital.   Today she was altered and her oxygen dropped to 70s in the NH, in addition to a drop in her bp.   She presented to the ed and was placed on bipap with no improvement so she was intubated.   She tested positive for covid, and her ct scan showed mild right pleural effusion. admitted to MICU called to evaluate (2023 00:39)      PAST MEDICAL & SURGICAL HISTORY:  HTN (hypertension)  Diabetes mellitus  Obesity  Gastroesophageal reflux disease  Active asthma  Generalized OA  Afib  H/O hernia repair   and revised in   History of cholecystectomy      ROS  Not preformed     VITALS:  T(F): 97.7, Max: 98.2 (23 @ 18:24)  HR: 70  BP: 96/56  RR: 28Vital Signs Last 24 Hrs  T(C): 36.5 (2023 09:27), Max: 36.8 (2023 18:24)  T(F): 97.7 (2023 09:27), Max: 98.2 (2023 18:24)  HR: 70 (2023 12:00) (48 - 662)  BP: 96/56 (2023 12:00) (73/44 - 117/58)  BP(mean): 81 (2023 12:00) (50 - 84)  RR: 28 (2023 12:00) (18 - 28)  SpO2: 91% (2023 12:00) (85% - 100%)    Parameters below as of 2023 09:27  Patient On (Oxygen Delivery Method): ventilator    O2 Concentration (%): 100    PHYSICAL EXAM:  Gen: NAD, resting in bed  HEENT: Normocephalic, atraumatic  Neck: supple, no lymphadenopathy  CV: Regular rate & regular rhythm  Lungs: decreased BS at bases  Abdomen: Soft, BS present  Ext: Warm, well perfused  Neuro: non focal, awake  Skin: no rash, no erythema    TESTS & MEASUREMENTS:                        12.0   29.21 )-----------( 230      ( 2023 05:55 )             34.9         125<L>  |  88<L>  |  73<HH>  ----------------------------<  212<H>  4.0   |  13<L>  |  5.4<HH>    Ca    7.6<L>      2023 05:55  Mg     1.9         TPro  5.4<L>  /  Alb  3.1<L>  /  TBili  0.5  /  DBili  x   /  AST  24  /  ALT  47<H>  /  AlkPhos  266<H>        LIVER FUNCTIONS - ( 2023 05:55 )  Alb: 3.1 g/dL / Pro: 5.4 g/dL / ALK PHOS: 266 U/L / ALT: 47 U/L / AST: 24 U/L / GGT: x           Urinalysis Basic - ( 2023 20:15 )    Color: Orange / Appearance: Turbid / S.012 / pH: x  Gluc: x / Ketone: Negative  / Bili: Negative / Urobili: <2 mg/dL   Blood: x / Protein: 300 mg/dL / Nitrite: Negative   Leuk Esterase: Large / RBC: 6 /HPF / WBC >720 /HPF   Sq Epi: x / Non Sq Epi: >27 /HPF / Bacteria: Many      Lactate, Blood: 1.5 mmol/L (23 @ 10:42)  Lactate, Blood: 1.0 mmol/L (23 @ 19:04)  Blood Gas Venous - Lactate: 1.10 mmol/L (23 @ 18:21)      INFECTIOUS DISEASES TESTING  MRSA PCR Result.: Negative (23 @ 10:42)      RADIOLOGY & ADDITIONAL TESTS:  I have personally reviewed the last Chest xray  CXR  Xray Chest 1 View- PORTABLE-Urgent:   ACC: [id number] EXAM:  XR CHEST PORTABLE URGENT 1V   ORDERED BY: RAHLU HUTTON     PROCEDURE DATE:  2023          INTERPRETATION:  Clinical History / Reason for exam: Respiratory failure    Comparison : Chest radiograph 3 1/2 hours prior.    Technique/Positioning: Frontal portable, low lung volumes.    Findings:    Support devices: Telemetry leads are seen. Endotracheal tube is above the   tor.    Cardiac/mediastinum/hilum: Cardiomegaly    Lung parenchyma/Pleura: Improved opacities    Skeleton/soft tissues: Unchanged    Impression:    Cardiomegaly. Improved CHF.    Support devices as described.        --- End of Report ---        MARISEL VACA MD; Attending Interventional Radiologist  This document has been electronically signed. 2023  7:44AM (23 @ 00:39)  Xray Chest 1 View- PORTABLE-Urgent:   ACC: [id number] EXAM:  XR CHEST PORTABLE URGENT 1V   ORDERED BY: MYA GONZALEZ     PROCEDURE DATE:  2023        INTERPRETATION:  Clinical History / Reason for exam: Shortness of breath    Comparison : Chest radiograph 2020.    Technique/Positioning: Frontal portable.    Findings:    Support devices: Telemetry leads are seen    Cardiac/mediastinum/hilum: Cardiomegaly.    Lung parenchyma/Pleura: Bilateral effusions with perihilar opacities    Skeleton/soft tissues: Unchanged    Impression:    CHF, significantly worsening.        --- End of Report ---        MARISEL VACA MD; Attending Interventional Radiologist  This document has been electronically signed. 2023  7:35AM (23 @ 20:56)      CT  CT Abdomen and Pelvis No Cont:   ACC: [id number] EXAM:  CT ABDOMEN AND PELVIS   ORDERED BY: RAHUL HUTTON     ACC: [id number] EXAM:  CT CHEST   ORDERED BY: RAHUL HUTTON     PROCEDURE DATE:  2023          INTERPRETATION:  CLINICAL HISTORY/REASON FOR EXAM: Shortness of breath.    TECHNIQUE: Contiguous axial CT images were obtained from the thoracic   inlet to the pubic symphysis without intravenous contrast. Oral contrast   was not administered. Reformatted images in the coronal and sagittal   planes were acquired. 3D (MIP) images obtained. There is breathing motion   artifact. There is photon starvation artifact. Truncation artifact which   limits evaluation of peripheral soft tissues.    COMPARISON: CT abdomen and pelvis 10/1/2021.      FINDINGS:    CHEST:    LUNGS, PLEURA, AIRWAYS: Mild right pleural effusion with adjacent   atelectasis. Bibasilar atelectatic changes versus scarring. Partial   obstruction of the distal right lower lobe bronchioles. Otherwise no   evidence of central endobronchial obstruction. No focal consolidation.   Right upper lobe pulmonary cyst (4-130). No pleural effusion. No   pneumothorax.    THORACIC NODES: Left periaortic lymph node measuring up to 1.1 cm   (4-127). No supraclavicular or axillary lymphadenopathy.    MEDIASTINUM/GREAT VESSELS: Cardiomegaly. No pericardial effusion. Dilated   main pulmonary artery measures up to 3.6 cm. The aorta is of normal   caliber. There is apparent thickening of the esophageal wall. If   indicated, endoscopy may be considered.    ABDOMEN/PELVIS:    HEPATOBILIARY: Cholecystectomy. Unremarkable CT appearance of the liver.    SPLEEN: Unremarkable.    PANCREAS: Unremarkable.    ADRENAL GLANDS: Redemonstrated 2.4 cm left adrenal gland nodule.    KIDNEYS: Redemonstrated hypertrophic right kidney, congenitally absent   left kidney.    ABDOMINOPELVIC NODES: Unremarkable.    PELVIC ORGANS: Redemonstrated calcified uterine fibroid.    PERITONEUM/MESENTERY/BOWEL: Redemonstrated anterior abdominal wall   diastasis, which contains bowel and part of liver. No bowel obstruction.   No pneumoperitoneum.    BONES/SOFT TISSUES: Degenerative changes of the spine. Peripheral soft   tissues are excluded from field-of-view.    OTHER: Atherosclerotic vascular calcifications. Right femoral venous   catheter.      IMPRESSION:    On this breathing motion, quantum mottle, and truncation artifact limited   examination:    Mild right pleural effusion.    Dilated main pulmonary artery, which can be seen in pulmonary   hypertension.    Chronic findings as above.    --- End of Report ---          YARELI WRIGHT MD; Resident Radiologist  This document has been electronically signed.  REINA CARTER MD; Attending Interventional Radiologist  This document has been electronically signed. 2023 10:44PM (23 @ 21:17)  CT Chest No Cont:   ACC: [id number] EXAM:  CT ABDOMEN AND PELVIS   ORDERED BY: RAHUL HUTTON     ACC: [id number] EXAM:  CT CHEST   ORDERED BY: RAHUL HUTTON     PROCEDURE DATE:  2023          INTERPRETATION:  CLINICAL HISTORY/REASON FOR EXAM: Shortness of breath.    TECHNIQUE: Contiguous axial CT images were obtained from the thoracic   inlet to the pubic symphysis without intravenous contrast. Oral contrast   was not administered. Reformatted images in the coronal and sagittal   planes were acquired. 3D (MIP) images obtained. There is breathing motion   artifact. There is photon starvation artifact. Truncation artifact which   limits evaluation of peripheral soft tissues.    COMPARISON: CT abdomen and pelvis 10/1/2021.      FINDINGS:    CHEST:    LUNGS, PLEURA, AIRWAYS: Mild right pleural effusion with adjacent   atelectasis. Bibasilar atelectatic changes versus scarring. Partial   obstruction of the distal right lower lobe bronchioles. Otherwise no   evidence of central endobronchial obstruction. No focal consolidation.   Right upper lobe pulmonary cyst (4-130). No pleural effusion. No   pneumothorax.    THORACIC NODES: Left periaortic lymph node measuring up to 1.1 cm   (4-127). No supraclavicular or axillary lymphadenopathy.    MEDIASTINUM/GREAT VESSELS: Cardiomegaly. No pericardial effusion. Dilated   main pulmonary artery measures up to 3.6 cm. The aorta is of normal   caliber. There is apparent thickening of the esophageal wall. If   indicated, endoscopy may be considered.    ABDOMEN/PELVIS:    HEPATOBILIARY: Cholecystectomy. Unremarkable CT appearance of the liver.    SPLEEN: Unremarkable.    PANCREAS: Unremarkable.    ADRENAL GLANDS: Redemonstrated 2.4 cm left adrenal gland nodule.    KIDNEYS: Redemonstrated hypertrophic right kidney, congenitally absent   left kidney.    ABDOMINOPELVIC NODES: Unremarkable.    PELVIC ORGANS: Redemonstrated calcified uterine fibroid.    PERITONEUM/MESENTERY/BOWEL: Redemonstrated anterior abdominal wall   diastasis, which contains bowel and part of liver. No bowel obstruction.   No pneumoperitoneum.    BONES/SOFT TISSUES: Degenerative changes of the spine. Peripheral soft   tissues are excluded from field-of-view.    OTHER: Atherosclerotic vascular calcifications. Right femoral venous   catheter.      IMPRESSION:    On this breathing motion, quantum mottle, and truncation artifact limited   examination:    Mild right pleural effusion.    Dilated main pulmonary artery, which can be seen in pulmonary   hypertension.    Chronic findings as above.    --- End of Report ---          YARELI WRIGHT MD; Resident Radiologist  This document has been electronically signed.  REINA CARTER MD; Attending Interventional Radiologist  This document has been electronically signed. 2023 10:44PM (23 @ 21:17)      CARDIOLOGY TESTING  12 Lead ECG:   Ventricular Rate 59 BPM    QRS Duration 118 ms    Q-T Interval 448 ms    QTC Calculation(Bazett) 443 ms    R Axis 86 degrees    T Axis 19 degrees    Diagnosis Line Atrial fibrillation with slow ventricular response  Low voltage QRS    Abnormal ECG    Confirmed by MAI RHOADES MD ([id number]) on 2023 7:24:08 AM (23 @ 20:19)      MEDICATIONS  cefTRIAXone   IVPB 1000  chlorhexidine 0.12% Liquid 15  dexAMETHasone  Injectable 6  dexMEDEtomidine Infusion 0.2  dextrose 5% 1000  fentaNYL   Infusion. 0.5  heparin  Infusion.   levoFLOXacin IVPB 500  norepinephrine Infusion 0.05  pantoprazole  Injectable 40  sodium bicarbonate  Injectable 100  vasopressin Infusion 0.04      ANTIBIOTICS:  cefTRIAXone   IVPB 1000 milliGRAM(s) IV Intermittent every 24 hours  levoFLOXacin IVPB 500 milliGRAM(s) IV Intermittent every 48 hours        ASSESSMENT  72y F admitted with ACUTE RESPIRATORY FAILURE WITH HYPOXIA; PLEURAL EFFUSION with a pmh of HTN (hypertension), Diabetes mellitus, Obesity, Gastroesophageal reflux disease, Active asthma, Generalized OA, Afib.  As per the family, the patient tested positive for covid 4 days ago and she has been feeling unwell. she lives in [facility] Today she was altered and her oxygen dropped to 70s in the NH, in addition to a drop in her bp.   She presented to the ed and was placed on bipap with no improvement so she was intubated.   She tested positive for covid, and her ct scan showed mild right pleural effusion. admitted to MICU  Pt on ventilator   Ground glass opacities seen on CT  WBC count is 29  Covid +  UA showed LE+, WBC+, Bacteria+, Epithelial cells 27+      PROBLEMS  Pt with Positive covid test  Positive Uranalysis/ possible cystitis    RECOMMENDATIONS:  - Start Vancomycin PO, 125 Q12 for prophylaxis for Cdiff as she has a history of Cdiff    # Possible cystitis  - f/u pending cultures  - f/u MRSA nares  - Continue Ceftriaxone 1g QD  - Continue Levofloxacin 500   - order urine legionella     # Covid  - Continue Dexamethasone   - No remdesivir at this time as there is no benefit, she is intubated and has an NOLA    - No tocilizumab at this time, f/u cultures, if all are negative may consider Tocilizumab

## 2023-02-01 NOTE — PATIENT PROFILE ADULT - DOES PATIENT HAVE ADVANCE DIRECTIVE
moderate assist (50% patients effort)
No family at bedside - patient came to ICU intubated
Ambulatory

## 2023-02-01 NOTE — H&P ADULT - NSICDXPASTMEDICALHX_GEN_ALL_CORE_FT
"Physical Therapy   Initial Evaluation     Patient Name: Lizzette Burton  Age:  85 y.o., Sex:  female  Medical Record #: [id number]  Today's Date: 12/27/2022     Precautions  Precautions: Fall Risk    Assessment    Lizzette Burton is a 85 y.o. female who presented 12/26/2022 with fall. Patient with history of hypertension, presents with fall. Patient reports she had fall a few days ago and was told she had rib fractures when seen at [facility], she was then discharged home. Today she states she fell again yesterday and continues to have chest pain.  Patient presents to [facility] with impaired balance, strength and mobility. At baseline she is indep but today is limited by pain and impaired activity tolerance. She has had frequent falls recently and is alone during the day. She will benefit from placement for further therapy at this time. Will continue to follow while in house.     Plan    Physical Therapy Initial Treatment Plan   Treatment Plan : Bed Mobility, Gait Training, Neuro Re-Education / Balance, Stair Training, Therapeutic Activities, Therapeutic Exercise  Treatment Frequency: 3 Times per Week  Duration: Until Therapy Goals Met    DC Equipment Recommendations: Unable to determine at this time  Discharge Recommendations: Recommend post-acute placement for additional physical therapy services prior to discharge home       Subjective    \"I'm just feeling sick\"     Objective       12/27/22 0945   Precautions   Precautions Fall Risk   Pain 0 - 10 Group   Location Rib Cage   Location Orientation Left   Therapist Pain Assessment During Activity;7   Prior Living Situation   Prior Services Intermittent Physical Support for ADL Per Family   Housing / Facility 2 Story House   Steps Into Home 0   Steps In Home 14   Rail Right Rail (Steps into Home)   Bathroom Set up Walk In Shower   Equipment Owned Front-Wheel Walker   Lives with - Patient's Self Care Capacity Adult Children   Comments lives with daughter who works during " the dya   Prior Level of Functional Mobility   Bed Mobility Independent   Transfer Status Independent   Ambulation Independent   Distance Ambulation (Feet)   (household distances)   Assistive Devices Used Front-Wheel Walker   Stairs Independent   Comments dtr reports a recent decline in function and mobility   History of Falls   History of Falls Yes   Date of Last Fall   (multiple recent falls)   Cognition    Cognition / Consciousness WDL   Level of Consciousness Alert   Comments pleasant and cooperative   Passive ROM Upper Body   Passive ROM Upper Body WDL   Active ROM Upper Body   Active ROM Upper Body  WDL   Strength Upper Body   Upper Body Strength  WDL   Sensation Upper Body   Upper Extremity Sensation  WDL   Active ROM Lower Body    Active ROM Lower Body  WDL   Strength Lower Body   Lower Body Strength  X   Gross Strength Generalized Weakness, Equal Bilaterally   Sensation Lower Body   Lower Extremity Sensation   WDL   Other Treatments   Other Treatments Provided Discussed discharge planning recs   Balance Assessment   Sitting Balance (Static) Fair   Sitting Balance (Dynamic) Fair -   Standing Balance (Static) Fair -   Standing Balance (Dynamic) Poor +   Weight Shift Sitting Fair   Weight Shift Standing Fair   Comments w/FWW   Bed Mobility    Supine to Sit Moderate Assist   Scooting Moderate Assist   Gait Analysis   Gait Level Of Assist Minimal Assist   Assistive Device Front Wheel Walker   Distance (Feet) 8   # of Times Distance was Traveled 2   Deviation Bradykinetic;Increased Base Of Support;Shuffled Gait   Comments in to the bathroom and back on 2L of O2   Functional Mobility   Sit to Stand Minimal Assist   Bed, Chair, Wheelchair Transfer Moderate Assist   How much difficulty does the patient currently have...   Turning over in bed (including adjusting bedclothes, sheets and blankets)? 2   Sitting down on and standing up from a chair with arms (e.g., wheelchair, bedside commode, etc.) 2   Moving from lying  on back to sitting on the side of the bed? 2   How much help from another person does the patient currently need...   Moving to and from a bed to a chair (including a wheelchair)? 2   Need to walk in a hospital room? 2   Climbing 3-5 steps with a railing? 1   6 clicks Mobility Score 11   Activity Tolerance   Sitting in Chair post session   Sitting Edge of Bed 10 min   Standing 3 min   Patient / Family Goals    Patient / Family Goal #1 to go home   Short Term Goals    Short Term Goal # 1 in 6 visits patient will demo all bed mobility indep for safe DC   Short Term Goal # 2 in 6 visis patient will ambualte 200' sup w/LRAD for safe DC   Short Term Goal # 3 in 6 visits patient will navigate 14 stairs with Radha for safe DC   Short Term Goal # 4 in 6 visits patient will demo all functional transfers with Radha for safe DC   Education Group   Education Provided Role of Physical Therapist;Gait Training   Role of Physical Therapist Patient Response Patient;Family;Acceptance;Explanation;Demonstration;Verbal Demonstration;Action Demonstration   Gait Training Patient Response Patient;Acceptance;Explanation;Demonstration;Verbal Demonstration;Action Demonstration   Physical Therapy Initial Treatment Plan    Treatment Plan  Bed Mobility;Gait Training;Neuro Re-Education / Balance;Stair Training;Therapeutic Activities;Therapeutic Exercise   Treatment Frequency 3 Times per Week   Duration Until Therapy Goals Met   Problem List    Problems Pain;Impaired Bed Mobility;Impaired Transfers;Impaired Ambulation;Functional Strength Deficit;Impaired Balance;Decreased Activity Tolerance   Anticipated Discharge Equipment and Recommendations   DC Equipment Recommendations Unable to determine at this time   Discharge Recommendations Recommend post-acute placement for additional physical therapy services prior to discharge home     Cinda Bhatt, PT, DPT, GCS       PAST MEDICAL HISTORY:  Active asthma     Afib     Diabetes mellitus     Gastroesophageal reflux disease     Generalized OA     HTN (hypertension)     Obesity

## 2023-02-01 NOTE — H&P ADULT - ASSESSMENT
IMPRESSION:  AHRF  Sepsis POA  Septic shock  COVID +  NOLA  likely ATN vs prerenal  Possible CAP  HO congentital unilateral kidney  HO A FIb not  on AC?      PLAN:    CNS: Sedate with fentanyl + precedex    HEENT: Oral care    PULMONARY:  HOB @ 45 degrees.  Vent changes as follows: ARDSNET Vent setting. Dexa  6mg daily. repeat CXR in AM    CARDIOVASCULAR: NS bolus, wean pressors, ECHO    GI: GI prophylaxis.  NPO.  Bowel regimen     RENAL:  Follow up lytes.  Correct as needed. renal US. urine lytes, serum/urine osm. Renal eval    INFECTIOUS DISEASE: Follow up cultures. rocephin + levaquin. check procal, urine legionella/ strep. MRSA nares. DTA. ID eval    HEMATOLOGICAL: patient has a fib not on ac, chadvasc of 3, will need heparin drip    ENDOCRINE:  Follow up FS.  Insulin protocol if needed.    MUSCULOSKELETAL: bedrest    MICU. IMPRESSION:    AHRF SP intubation  ARDS  Sepsis POA  Septic shock  COVID +  NOLA  likely ATN vs prerenal  Possible CAP  HO congentital unilateral kidney  HO A FIb not  on AC?      PLAN:    CNS: Sedate with fentanyl + precedex    HEENT: Oral care    PULMONARY:  HOB @ 45 degrees.  Vent changes as follows: ARDSNET Vent setting. Dexa  6mg daily. repeat CXR in AM    CARDIOVASCULAR: NS bolus, wean pressors, ECHO    GI: GI prophylaxis.  NPO.  Bowel regimen     RENAL:  Follow up lytes.  Correct as needed. renal US. urine lytes, serum/urine osm. Renal eval    INFECTIOUS DISEASE: Follow up cultures. rocephin + levaquin. check procal, urine legionella/ strep. MRSA nares. DTA. ID eval    HEMATOLOGICAL: patient has a fib not on ac, chadvasc of 3, will need heparin drip    ENDOCRINE:  Follow up FS.  Insulin protocol if needed.    MUSCULOSKELETAL: bedrest    MICU.

## 2023-02-01 NOTE — H&P ADULT - ATTENDING COMMENTS
Events noted, acute hypoxemic resp failure, sp intubation, sp chest ct, worsening status/ ARDS, tested + for covid 4 days ago, found to be in renal failure, septic on pressors    - Daily SAT  - increase RR/ esophageal balloon/ adjust PEEP, monitor P/P/ DP, Keep Sao2 92 to 96%  - echo/ CE/ bicarb drip  - NPO  - iv ABX, pancx, steroids, infl markers  - LE doppler  - trend cmp, renal eval  - MICU  prognosis poor

## 2023-02-01 NOTE — H&P ADULT - NSHPPHYSICALEXAM_GEN_ALL_CORE
VITALS:   T(C): 35.7 (02-01-23 @ 00:10), Max: 36.8 (01-31-23 @ 18:24)  HR: 73 (02-01-23 @ 00:10) (48 - 109)  BP: 105/63 (02-01-23 @ 00:10) (73/44 - 108/50)  RR: 24 (01-31-23 @ 18:48) (24 - 24)  SpO2: 98% (02-01-23 @ 00:10) (95% - 100%)    GENERAL: intubated sedated   HEAD:  Atraumatic, normocephalic  ENT: Moist mucous membranes  NECK: Supple, no JVD  HEART:, no murmurs, rubs, or gallops  LUNGS:  rhonchi  ABDOMEN: Soft, nontender, nondistended, +BS  EXTREMITIES: 2+ peripheral pulses bilaterally. No clubbing, cyanosis, or edema  NERVOUS SYSTEM: intubated and sedated   SKIN: No rashes or lesions

## 2023-02-01 NOTE — CONSULT NOTE ADULT - ASSESSMENT
NOLA / last creat 0.9 on 1/3/23 per HIE   Likely ATN / CRS  Hypotension on pressors  NOLA / last creat 0.9 on 1/3/23 per HIE / hx of solitary kidney   Likely ATN (hypotension, COVID19/ARDS) / CRS / heavy NSAID use  Hyponatremia improving with iv hydration  HAGMA d/t renal failure / r/o lactic acidosis/DKA  Acute respiratory failure / pulmonary edema / COVID19 / ARDS   Septic shock on pressors     - oliguric  - creat up-trending  - ventilated on 80 to 100% FiO2  - on pressors    Recommendations:   - decrease bicarb drip to 75cc/hr;   D/c and start lasix iv if remains oliguric  - strict i/o   - avoid hypotonic infusions/ monitor Na level  - check phos level, CPK noted ok  - UA noted, f/u cultures, on ceftriaxone/levaquin and dexamethasone  - hyperglycemic with HAGMA, check serum ketones, lactate noted, start insulin ICU protocol  - monitor ionized calcium level closely especially while on bicarb drip.  Replete as needed to level > 1   - no acute indication for RRT yet  - obtain renal/bladder ultrasound when stable  - poor prognosis

## 2023-02-01 NOTE — PATIENT PROFILE ADULT - FALL HARM RISK - HARM RISK INTERVENTIONS

## 2023-02-01 NOTE — CONSULT NOTE ADULT - ASSESSMENT
- covid +  - acute hypoxic resp failure/ ARDS  - morbid obesity  - DM with hyperglycemia  - NOLA  - shock on pressors    SUGGEST:  - glycemic control   - A1c pending  - as d/w residents, suspect po intake has been minimal since symptoms started several days before admission  - f/u pressors and renal function in am, then:  - start enteral feeds with Peptamen AF (whey protein r/t renal function, higher % protein and low n6:3fa r/t covid and ARDS) at 25 ml/h       If tolerated for first 4-6 h, will then increase to goal.  - f/u phos with am labs

## 2023-02-01 NOTE — CONSULT NOTE ADULT - SUBJECTIVE AND OBJECTIVE BOX
NEPHROLOGY CONSULTATION NOTE    PATRICIA SPRAGUE  72y  Female  MRN-[id number]    CC:   Patient is a 72y old  Female who presents with a chief complaint of respiratory distress (2023 12:17)      HPI:  71 yo female kth afib, htn, solitary kidney presented from [facility] for SOB.   As per the family, the patient tested positive for covid 4 days ago and she has been feeling unwell. She was also SOB but she refused to visit the hospital.   Today she was altered and her oxygen dropped to 70s in the NH, in addition to a drop in her bp.   She presented to the ed and was placed on bipap with no improvement so she was intubated.   She tested positive for covid, and her ct scan showed mild right pleural effusion. admitted to MICU called to evaluate (2023 00:39)      PAST MEDICAL & SURGICAL HISTORY:  HTN (hypertension)      Diabetes mellitus      Obesity      Gastroesophageal reflux disease      Active asthma      Generalized OA      Afib      H/O hernia repair   and revised in       History of cholecystectomy        Allergies:  No Known Allergies    Home Medications Reviewed  Hospital Medications:   MEDICATIONS  (STANDING):  cefTRIAXone   IVPB 1000 milliGRAM(s) IV Intermittent every 24 hours  chlorhexidine 0.12% Liquid 15 milliLiter(s) Oral Mucosa every 12 hours  dexAMETHasone  Injectable 6 milliGRAM(s) IV Push daily  dexMEDEtomidine Infusion 0.2 MICROgram(s)/kG/Hr (5.5 mL/Hr) IV Continuous <Continuous>  dextrose 5% 1000 milliLiter(s) (150 mL/Hr) IV Continuous <Continuous>  fentaNYL   Infusion. 0.5 MICROgram(s)/kG/Hr (5.5 mL/Hr) IV Continuous <Continuous>  heparin  Infusion.  Unit(s)/Hr (19 mL/Hr) IV Continuous <Continuous>  levoFLOXacin IVPB 500 milliGRAM(s) IV Intermittent every 48 hours  norepinephrine Infusion 0.05 MICROgram(s)/kG/Min (5.16 mL/Hr) IV Continuous <Continuous>  pantoprazole  Injectable 40 milliGRAM(s) IV Push daily  sodium bicarbonate  Injectable 100 milliEquivalent(s) IV Push once  vasopressin Infusion 0.04 Unit(s)/Min (6 mL/Hr) IV Continuous <Continuous>    MEDICATIONS  (PRN):  acetaminophen  Suppository .. 650 milliGRAM(s) Rectal every 4 hours PRN Temp greater or equal to 38.5C (101.3F)  heparin   Injectable 9000 Unit(s) IV Push every 6 hours PRN For aPTT less than 40  heparin   Injectable 4000 Unit(s) IV Push every 6 hours PRN For aPTT between 40 - 57    Home Medications:  alendronate 70 mg oral tablet: 1 tab(s) orally once a week (2023 00:45)  carvedilol 12.5 mg oral tablet: 1 tab(s) orally 2 times a day (2023 00:48)  cyanocobalamin 1000 mcg oral tablet, extended release: 1 tab(s) orally once a day (2023 00:46)  labetalol 100 mg oral tablet: 1 tab(s) orally 2 times a day (2020 06:59)  losartan-hydrochlorothiazide 50 mg-12.5 mg oral tablet: 1 tab(s) orally once a day (2023 00:46)  NIFEdipine 90 mg oral tablet, extended release: 1 tab(s) orally once a day (2023 00:46)  oxycodone-acetaminophen 5 mg-325 mg oral tablet: 1 tab(s) orally every 6 hours, As Needed - 6) for severe pain  (2020 06:59)  potassium chloride 10 mEq oral capsule, extended release: 2 cap(s) orally once a day (2020 06:59)  rivaroxaban 20 mg oral tablet: 1 tab(s) orally once a day (before a meal) (2020 06:59)  sodium bicarbonate 650 mg oral tablet: 1 tab(s) orally once a day (2023 00:47)      SOCIAL HISTORY:  Social History:      FAMILY HISTORY:      REVIEW OF SYSTEMS:  Unable to obtain d/t critical illness    VITALS:  T(F): 97.6 (23 @ 12:00), Max: 98.2 (23 @ 18:24)  HR: 60 (23 @ 14:30)  BP: 107/59 (23 @ 14:30)  RR: 28 (23 @ 14:00)  SpO2: 94% (23 @ 14:30)  Mode: AC/ CMV (Assist Control/ Continuous Mandatory Ventilation)  RR (machine): 20  TV (machine): 400  FiO2: 80  PEEP: 16  ITime: 1  MAP: 18  PIP: 37    Height (cm): 165.1 ( @ 12:05)  Weight (kg): 110 ( @ 19:58)  BMI (kg/m2): 40.4 ( @ 12:05)  BSA (m2): 2.15 ( @ 12:05)  I&O's Detail    2023 07:  -  2023 14:56  --------------------------------------------------------  IN:    Dexmedetomidine: 18 mL    dextrose 5% w/ Additives: 1050 mL    FentaNYL: 66 mL    Heparin Infusion: 114 mL    IV PiggyBack: 150 mL    Norepinephrine: 676 mL    Vasopressin: 42 mL  Total IN: 2116 mL    OUT:    Indwelling Catheter - Urethral (mL): 180 mL  Total OUT: 180 mL    Total NET: 1936 mL        Mode: AC/ CMV (Assist Control/ Continuous Mandatory Ventilation), RR (machine): 20, TV (machine): 400, FiO2: 80, PEEP: 16, ITime: 1, MAP: 18, PIP: 37  Creatine Kinase, Serum: 107 U/L (23 @ 10:42)  Creatine Kinase, Serum: 139 U/L (23 @ 05:55)    I&O's Summary    2023 07:  -  2023 14:56  --------------------------------------------------------  IN: 2116 mL / OUT: 180 mL / NET: 1936 mL        PHYSICAL EXAM:  Gen: intubated/ventilated    LABS:  Daily Weight in k.6 (2023 09:27)  ABG - ( 2023 11:31 )  pH, Arterial: 7.15  pH, Blood: x     /  pCO2: 50    /  pO2: 65    / HCO3: 17    / Base Excess: -11.4 /  SaO2: 90.8          126<L>  |  87<L>  |  75<HH>  ----------------------------<  340<H>  3.8   |  17  |  5.3<HH>    Ca    7.3<L>      2023 10:10  Mg     1.9         TPro  5.4<L>  /  Alb  3.1<L>  /  TBili  0.5  /  DBili      /  AST  24  /  ALT  47<H>  /  AlkPhos  266<H>        Creatinine Trend:   Creatinine, Serum: 5.3 mg/dL (23 @ 10:10)  Creatinine, Serum: 5.4 mg/dL (23 @ 05:55)  Creatinine, Serum: 5.1 mg/dL (23 @ 19:04)  Creatinine, Serum: 0.6 mg/dL (20 @ 06:31)A                            12.0   32.72 )-----------( 316      ( 2023 10:10 )             35.0     Mean Cell Volume: 82.9 fL (23 @ 10:10)    Urine Studies:  Urinalysis Basic - ( 2023 20:15 )    Color: Orange / Appearance: Turbid / S.012 / pH:   Gluc:  / Ketone: Negative  / Bili: Negative / Urobili: <2 mg/dL   Blood:  / Protein: 300 mg/dL / Nitrite: Negative   Leuk Esterase: Large / RBC: 6 /HPF / WBC >720 /HPF   Sq Epi:  / Non Sq Epi: >27 /HPF / Bacteria: Many                RADIOLOGY & ADDITIONAL STUDIES:        Xray Chest 1 View- PORTABLE-Urgent:   ACC: [id number] EXAM:  XR CHEST PORTABLE URGENT 1V   ORDERED BY: RAHUL HUTTON     PROCEDURE DATE:  2023          INTERPRETATION:  Clinical History / Reason for exam: Respiratory failure    Comparison : Chest radiograph 3 1/2 hours prior.    Technique/Positioning: Frontal portable, low lung volumes.    Findings:    Support devices: Telemetry leads are seen. Endotracheal tube is above the   tor.    Cardiac/mediastinum/hilum: Cardiomegaly    Lung parenchyma/Pleura: Improved opacities    Skeleton/soft tissues: Unchanged    Impression:    Cardiomegaly. Improved CHF.    Support devices as described.        --- End of Report ---            MARISEL VACA MD; Attending Interventional Radiologist  This document has been electronically signed. 2023  7:44AM (23 @ 00:39)                     NEPHROLOGY CONSULTATION NOTE    PATRICIA SPRAGUE  72y  Female  MRN-[id number]    CC:   Patient is a 72y old  Female who presents with a chief complaint of respiratory distress (2023 12:17)      HPI:  73 yo female kth afib, htn, solitary kidney presented from [facility] for SOB.   As per the family, the patient tested positive for covid 4 days ago and she has been feeling unwell. She was also SOB but she refused to visit the hospital.   Today she was altered and her oxygen dropped to 70s in the NH, in addition to a drop in her bp.   She presented to the ed and was placed on bipap with no improvement so she was intubated.   She tested positive for covid, and her ct scan showed mild right pleural effusion. admitted to MICU called to evaluate (2023 00:39)      PAST MEDICAL & SURGICAL HISTORY:  HTN (hypertension)      Diabetes mellitus      Obesity      Gastroesophageal reflux disease      Active asthma      Generalized OA      Afib      H/O hernia repair   and revised in       History of cholecystectomy        Allergies:  No Known Allergies    Home Medications Reviewed  Hospital Medications:   MEDICATIONS  (STANDING):  cefTRIAXone   IVPB 1000 milliGRAM(s) IV Intermittent every 24 hours  chlorhexidine 0.12% Liquid 15 milliLiter(s) Oral Mucosa every 12 hours  dexAMETHasone  Injectable 6 milliGRAM(s) IV Push daily  dexMEDEtomidine Infusion 0.2 MICROgram(s)/kG/Hr (5.5 mL/Hr) IV Continuous <Continuous>  dextrose 5% 1000 milliLiter(s) (150 mL/Hr) IV Continuous <Continuous>  fentaNYL   Infusion. 0.5 MICROgram(s)/kG/Hr (5.5 mL/Hr) IV Continuous <Continuous>  heparin  Infusion.  Unit(s)/Hr (19 mL/Hr) IV Continuous <Continuous>  levoFLOXacin IVPB 500 milliGRAM(s) IV Intermittent every 48 hours  norepinephrine Infusion 0.05 MICROgram(s)/kG/Min (5.16 mL/Hr) IV Continuous <Continuous>  pantoprazole  Injectable 40 milliGRAM(s) IV Push daily  sodium bicarbonate  Injectable 100 milliEquivalent(s) IV Push once  vasopressin Infusion 0.04 Unit(s)/Min (6 mL/Hr) IV Continuous <Continuous>    MEDICATIONS  (PRN):  acetaminophen  Suppository .. 650 milliGRAM(s) Rectal every 4 hours PRN Temp greater or equal to 38.5C (101.3F)  heparin   Injectable 9000 Unit(s) IV Push every 6 hours PRN For aPTT less than 40  heparin   Injectable 4000 Unit(s) IV Push every 6 hours PRN For aPTT between 40 - 57    Home Medications:  alendronate 70 mg oral tablet: 1 tab(s) orally once a week (2023 00:45)  carvedilol 12.5 mg oral tablet: 1 tab(s) orally 2 times a day (2023 00:48)  cyanocobalamin 1000 mcg oral tablet, extended release: 1 tab(s) orally once a day (2023 00:46)  labetalol 100 mg oral tablet: 1 tab(s) orally 2 times a day (2020 06:59)  losartan-hydrochlorothiazide 50 mg-12.5 mg oral tablet: 1 tab(s) orally once a day (2023 00:46)  NIFEdipine 90 mg oral tablet, extended release: 1 tab(s) orally once a day (2023 00:46)  oxycodone-acetaminophen 5 mg-325 mg oral tablet: 1 tab(s) orally every 6 hours, As Needed - 6) for severe pain  (2020 06:59)  potassium chloride 10 mEq oral capsule, extended release: 2 cap(s) orally once a day (2020 06:59)  rivaroxaban 20 mg oral tablet: 1 tab(s) orally once a day (before a meal) (2020 06:59)  sodium bicarbonate 650 mg oral tablet: 1 tab(s) orally once a day (2023 00:47)      SOCIAL HISTORY:  Social History:      FAMILY HISTORY:      REVIEW OF SYSTEMS:  Unable to obtain d/t critical illness    VITALS:  T(F): 97.6 (23 @ 12:00), Max: 98.2 (23 @ 18:24)  HR: 60 (23 @ 14:30)  BP: 107/59 (23 @ 14:30)  RR: 28 (23 @ 14:00)  SpO2: 94% (23 @ 14:30)  Mode: AC/ CMV (Assist Control/ Continuous Mandatory Ventilation)  RR (machine): 20  TV (machine): 400  FiO2: 80  PEEP: 16  ITime: 1  MAP: 18  PIP: 37    Height (cm): 165.1 ( @ 12:05)  Weight (kg): 110 ( @ 19:58)  BMI (kg/m2): 40.4 ( @ 12:05)  BSA (m2): 2.15 ( @ 12:05)  I&O's Detail    2023 07:  -  2023 14:56  --------------------------------------------------------  IN:    Dexmedetomidine: 18 mL    dextrose 5% w/ Additives: 1050 mL    FentaNYL: 66 mL    Heparin Infusion: 114 mL    IV PiggyBack: 150 mL    Norepinephrine: 676 mL    Vasopressin: 42 mL  Total IN: 2116 mL    OUT:    Indwelling Catheter - Urethral (mL): 180 mL  Total OUT: 180 mL    Total NET: 1936 mL        Mode: AC/ CMV (Assist Control/ Continuous Mandatory Ventilation), RR (machine): 20, TV (machine): 400, FiO2: 80, PEEP: 16, ITime: 1, MAP: 18, PIP: 37  Creatine Kinase, Serum: 107 U/L (23 @ 10:42)  Creatine Kinase, Serum: 139 U/L (23 @ 05:55)    I&O's Summary    2023 07:  -  2023 14:56  --------------------------------------------------------  IN: 2116 mL / OUT: 180 mL / NET: 1936 mL        PHYSICAL EXAM:  Gen: intubated/ventilated  Abd: soft   Ext: no edema  Burger    LABS:  Daily Weight in k.6 (2023 09:27)  ABG - ( 2023 11:31 )  pH, Arterial: 7.15  pH, Blood: x     /  pCO2: 50    /  pO2: 65    / HCO3: 17    / Base Excess: -11.4 /  SaO2: 90.8          126<L>  |  87<L>  |  75<HH>  ----------------------------<  340<H>  3.8   |  17  |  5.3<HH>    Ca    7.3<L>      2023 10:10  Mg     1.9         TPro  5.4<L>  /  Alb  3.1<L>  /  TBili  0.5  /  DBili      /  AST  24  /  ALT  47<H>  /  AlkPhos  266<H>        Creatinine Trend:   Creatinine, Serum: 5.3 mg/dL (23 @ 10:10)  Creatinine, Serum: 5.4 mg/dL (23 @ 05:55)  Creatinine, Serum: 5.1 mg/dL (23 @ 19:04)  Creatinine, Serum: 0.6 mg/dL (20 @ 06:31)A                            12.0   32.72 )-----------( 316      ( 2023 10:10 )             35.0     Mean Cell Volume: 82.9 fL (23 @ 10:10)    Urine Studies:  Urinalysis Basic - ( 2023 20:15 )    Color: Orange / Appearance: Turbid / S.012 / pH:   Gluc:  / Ketone: Negative  / Bili: Negative / Urobili: <2 mg/dL   Blood:  / Protein: 300 mg/dL / Nitrite: Negative   Leuk Esterase: Large / RBC: 6 /HPF / WBC >720 /HPF   Sq Epi:  / Non Sq Epi: >27 /HPF / Bacteria: Many                RADIOLOGY & ADDITIONAL STUDIES:        Xray Chest 1 View- PORTABLE-Urgent:   ACC: [id number] EXAM:  XR CHEST PORTABLE URGENT 1V   ORDERED BY: RAHUL HUTTON     PROCEDURE DATE:  2023          INTERPRETATION:  Clinical History / Reason for exam: Respiratory failure    Comparison : Chest radiograph 3 1/2 hours prior.    Technique/Positioning: Frontal portable, low lung volumes.    Findings:    Support devices: Telemetry leads are seen. Endotracheal tube is above the   tor.    Cardiac/mediastinum/hilum: Cardiomegaly    Lung parenchyma/Pleura: Improved opacities    Skeleton/soft tissues: Unchanged    Impression:    Cardiomegaly. Improved CHF.    Support devices as described.        --- End of Report ---            MARISEL VACA MD; Attending Interventional Radiologist  This document has been electronically signed. 2023  7:44AM (23 @ 00:39)

## 2023-02-02 LAB
-  STAPHYLOCOCCUS EPIDERMIDIS, METHICILLIN RESISTANT: SIGNIFICANT CHANGE UP
A1C WITH ESTIMATED AVERAGE GLUCOSE RESULT: 5.5 % — SIGNIFICANT CHANGE UP (ref 4–5.6)
ANION GAP SERPL CALC-SCNC: 12 MMOL/L — SIGNIFICANT CHANGE UP (ref 7–14)
ANION GAP SERPL CALC-SCNC: 24 MMOL/L — HIGH (ref 7–14)
APTT BLD: 33.5 SEC — SIGNIFICANT CHANGE UP (ref 27–39.2)
APTT BLD: 34.2 SEC — SIGNIFICANT CHANGE UP (ref 27–39.2)
APTT BLD: 38.1 SEC — SIGNIFICANT CHANGE UP (ref 27–39.2)
APTT BLD: >200 SEC — CRITICAL HIGH (ref 27–39.2)
BASE EXCESS BLDA CALC-SCNC: -5.2 MMOL/L — LOW (ref -2–3)
BUN SERPL-MCNC: 58 MG/DL — HIGH (ref 10–20)
BUN SERPL-MCNC: 76 MG/DL — CRITICAL HIGH (ref 10–20)
CALCIUM SERPL-MCNC: 5.5 MG/DL — CRITICAL LOW (ref 8.4–10.4)
CALCIUM SERPL-MCNC: 7.3 MG/DL — LOW (ref 8.4–10.5)
CHLORIDE SERPL-SCNC: 102 MMOL/L — SIGNIFICANT CHANGE UP (ref 98–110)
CHLORIDE SERPL-SCNC: 86 MMOL/L — LOW (ref 98–110)
CO2 SERPL-SCNC: 17 MMOL/L — SIGNIFICANT CHANGE UP (ref 17–32)
CO2 SERPL-SCNC: 18 MMOL/L — SIGNIFICANT CHANGE UP (ref 17–32)
CREAT SERPL-MCNC: 3.4 MG/DL — HIGH (ref 0.7–1.5)
CREAT SERPL-MCNC: 4.9 MG/DL — CRITICAL HIGH (ref 0.7–1.5)
CULTURE RESULTS: SIGNIFICANT CHANGE UP
EGFR: 14 ML/MIN/1.73M2 — LOW
EGFR: 9 ML/MIN/1.73M2 — LOW
ESTIMATED AVERAGE GLUCOSE: 111 MG/DL — SIGNIFICANT CHANGE UP (ref 68–114)
FERRITIN SERPL-MCNC: 347 NG/ML — HIGH (ref 15–150)
GAS PNL BLDA: SIGNIFICANT CHANGE UP
GLUCOSE BLDC GLUCOMTR-MCNC: 173 MG/DL — HIGH (ref 70–99)
GLUCOSE BLDC GLUCOMTR-MCNC: 212 MG/DL — HIGH (ref 70–99)
GLUCOSE BLDC GLUCOMTR-MCNC: 267 MG/DL — HIGH (ref 70–99)
GLUCOSE BLDC GLUCOMTR-MCNC: 271 MG/DL — HIGH (ref 70–99)
GLUCOSE BLDC GLUCOMTR-MCNC: 287 MG/DL — HIGH (ref 70–99)
GLUCOSE SERPL-MCNC: 138 MG/DL — HIGH (ref 70–99)
GLUCOSE SERPL-MCNC: 303 MG/DL — HIGH (ref 70–99)
GRAM STN FLD: SIGNIFICANT CHANGE UP
HCO3 BLDA-SCNC: 22 MMOL/L — SIGNIFICANT CHANGE UP (ref 21–28)
HCT VFR BLD CALC: 30 % — LOW (ref 37–47)
HCT VFR BLD CALC: 33 % — LOW (ref 37–47)
HGB BLD-MCNC: 10.8 G/DL — LOW (ref 12–16)
HGB BLD-MCNC: 11.8 G/DL — LOW (ref 12–16)
LEGIONELLA AG UR QL: NEGATIVE — SIGNIFICANT CHANGE UP
MCHC RBC-ENTMCNC: 28.6 PG — SIGNIFICANT CHANGE UP (ref 27–31)
MCHC RBC-ENTMCNC: 29.4 PG — SIGNIFICANT CHANGE UP (ref 27–31)
MCHC RBC-ENTMCNC: 35.8 G/DL — SIGNIFICANT CHANGE UP (ref 32–37)
MCHC RBC-ENTMCNC: 36 G/DL — SIGNIFICANT CHANGE UP (ref 32–37)
MCV RBC AUTO: 79.6 FL — LOW (ref 81–99)
MCV RBC AUTO: 82.1 FL — SIGNIFICANT CHANGE UP (ref 81–99)
METHOD TYPE: SIGNIFICANT CHANGE UP
NRBC # BLD: 0 /100 WBCS — SIGNIFICANT CHANGE UP (ref 0–0)
NRBC # BLD: 0 /100 WBCS — SIGNIFICANT CHANGE UP (ref 0–0)
ORGANISM # SPEC MICROSCOPIC CNT: SIGNIFICANT CHANGE UP
ORGANISM # SPEC MICROSCOPIC CNT: SIGNIFICANT CHANGE UP
PCO2 BLDA: 46 MMHG — SIGNIFICANT CHANGE UP (ref 25–48)
PH BLDA: 7.28 — LOW (ref 7.35–7.45)
PHOSPHATE SERPL-MCNC: 7.6 MG/DL — HIGH (ref 2.1–4.9)
PLATELET # BLD AUTO: 197 K/UL — SIGNIFICANT CHANGE UP (ref 130–400)
PLATELET # BLD AUTO: 275 K/UL — SIGNIFICANT CHANGE UP (ref 130–400)
PO2 BLDA: 121 MMHG — HIGH (ref 83–108)
POTASSIUM SERPL-MCNC: 2.4 MMOL/L — CRITICAL LOW (ref 3.5–5)
POTASSIUM SERPL-MCNC: 3.3 MMOL/L — LOW (ref 3.5–5)
POTASSIUM SERPL-SCNC: 2.4 MMOL/L — CRITICAL LOW (ref 3.5–5)
POTASSIUM SERPL-SCNC: 3.3 MMOL/L — LOW (ref 3.5–5)
PROCALCITONIN SERPL-MCNC: 37.2 NG/ML — HIGH (ref 0.02–0.1)
RBC # BLD: 3.77 M/UL — LOW (ref 4.2–5.4)
RBC # BLD: 4.02 M/UL — LOW (ref 4.2–5.4)
RBC # FLD: 12.7 % — SIGNIFICANT CHANGE UP (ref 11.5–14.5)
RBC # FLD: 13.2 % — SIGNIFICANT CHANGE UP (ref 11.5–14.5)
SAO2 % BLDA: 97.3 % — SIGNIFICANT CHANGE UP (ref 94–98)
SODIUM SERPL-SCNC: 128 MMOL/L — LOW (ref 135–146)
SODIUM SERPL-SCNC: 131 MMOL/L — LOW (ref 135–146)
SPECIMEN SOURCE: SIGNIFICANT CHANGE UP
WBC # BLD: 29.84 K/UL — HIGH (ref 4.8–10.8)
WBC # BLD: 30.31 K/UL — HIGH (ref 4.8–10.8)
WBC # FLD AUTO: 29.84 K/UL — HIGH (ref 4.8–10.8)
WBC # FLD AUTO: 30.31 K/UL — HIGH (ref 4.8–10.8)

## 2023-02-02 PROCEDURE — 71045 X-RAY EXAM CHEST 1 VIEW: CPT | Mod: 26,77

## 2023-02-02 PROCEDURE — 74018 RADEX ABDOMEN 1 VIEW: CPT | Mod: 26,76

## 2023-02-02 PROCEDURE — 71045 X-RAY EXAM CHEST 1 VIEW: CPT | Mod: 26

## 2023-02-02 PROCEDURE — 76857 US EXAM PELVIC LIMITED: CPT | Mod: 26

## 2023-02-02 PROCEDURE — 99291 CRITICAL CARE FIRST HOUR: CPT

## 2023-02-02 PROCEDURE — 74018 RADEX ABDOMEN 1 VIEW: CPT | Mod: 26,77

## 2023-02-02 RX ORDER — HYDROCORTISONE 20 MG
50 TABLET ORAL EVERY 6 HOURS
Refills: 0 | Status: DISCONTINUED | OUTPATIENT
Start: 2023-02-02 | End: 2023-02-04

## 2023-02-02 RX ORDER — INSULIN LISPRO 100/ML
VIAL (ML) SUBCUTANEOUS
Refills: 0 | Status: DISCONTINUED | OUTPATIENT
Start: 2023-02-02 | End: 2023-02-02

## 2023-02-02 RX ORDER — POTASSIUM CHLORIDE 20 MEQ
20 PACKET (EA) ORAL ONCE
Refills: 0 | Status: COMPLETED | OUTPATIENT
Start: 2023-02-02 | End: 2023-02-02

## 2023-02-02 RX ORDER — DEXTROSE 50 % IN WATER 50 %
25 SYRINGE (ML) INTRAVENOUS ONCE
Refills: 0 | Status: DISCONTINUED | OUTPATIENT
Start: 2023-02-02 | End: 2023-02-02

## 2023-02-02 RX ORDER — INSULIN LISPRO 100/ML
VIAL (ML) SUBCUTANEOUS EVERY 6 HOURS
Refills: 0 | Status: DISCONTINUED | OUTPATIENT
Start: 2023-02-02 | End: 2023-02-25

## 2023-02-02 RX ORDER — FENTANYL CITRATE 50 UG/ML
0.5 INJECTION INTRAVENOUS
Qty: 2500 | Refills: 0 | Status: DISCONTINUED | OUTPATIENT
Start: 2023-02-02 | End: 2023-02-07

## 2023-02-02 RX ORDER — DEXTROSE 50 % IN WATER 50 %
12.5 SYRINGE (ML) INTRAVENOUS ONCE
Refills: 0 | Status: DISCONTINUED | OUTPATIENT
Start: 2023-02-02 | End: 2023-02-02

## 2023-02-02 RX ORDER — HEPARIN SODIUM 5000 [USP'U]/ML
2400 INJECTION INTRAVENOUS; SUBCUTANEOUS
Qty: 25000 | Refills: 0 | Status: DISCONTINUED | OUTPATIENT
Start: 2023-02-02 | End: 2023-02-08

## 2023-02-02 RX ORDER — SODIUM CHLORIDE 9 MG/ML
1000 INJECTION, SOLUTION INTRAVENOUS
Refills: 0 | Status: DISCONTINUED | OUTPATIENT
Start: 2023-02-02 | End: 2023-02-02

## 2023-02-02 RX ORDER — VANCOMYCIN HCL 1 G
125 VIAL (EA) INTRAVENOUS ONCE
Refills: 0 | Status: COMPLETED | OUTPATIENT
Start: 2023-02-02 | End: 2023-02-02

## 2023-02-02 RX ORDER — VANCOMYCIN HCL 1 G
2000 VIAL (EA) INTRAVENOUS ONCE
Refills: 0 | Status: COMPLETED | OUTPATIENT
Start: 2023-02-02 | End: 2023-02-02

## 2023-02-02 RX ORDER — GLUCAGON INJECTION, SOLUTION 0.5 MG/.1ML
1 INJECTION, SOLUTION SUBCUTANEOUS ONCE
Refills: 0 | Status: DISCONTINUED | OUTPATIENT
Start: 2023-02-02 | End: 2023-02-07

## 2023-02-02 RX ORDER — POTASSIUM CHLORIDE 20 MEQ
20 PACKET (EA) ORAL
Refills: 0 | Status: COMPLETED | OUTPATIENT
Start: 2023-02-02 | End: 2023-02-02

## 2023-02-02 RX ORDER — VANCOMYCIN HCL 1 G
125 VIAL (EA) INTRAVENOUS
Refills: 0 | Status: DISCONTINUED | OUTPATIENT
Start: 2023-02-02 | End: 2023-02-02

## 2023-02-02 RX ORDER — CEFTRIAXONE 500 MG/1
2000 INJECTION, POWDER, FOR SOLUTION INTRAMUSCULAR; INTRAVENOUS EVERY 24 HOURS
Refills: 0 | Status: DISCONTINUED | OUTPATIENT
Start: 2023-02-02 | End: 2023-02-03

## 2023-02-02 RX ORDER — DEXMEDETOMIDINE HYDROCHLORIDE IN 0.9% SODIUM CHLORIDE 4 UG/ML
0.2 INJECTION INTRAVENOUS
Qty: 400 | Refills: 0 | Status: DISCONTINUED | OUTPATIENT
Start: 2023-02-02 | End: 2023-02-07

## 2023-02-02 RX ORDER — DEXTROSE 50 % IN WATER 50 %
15 SYRINGE (ML) INTRAVENOUS ONCE
Refills: 0 | Status: DISCONTINUED | OUTPATIENT
Start: 2023-02-02 | End: 2023-02-02

## 2023-02-02 RX ORDER — CHLORHEXIDINE GLUCONATE 213 G/1000ML
1 SOLUTION TOPICAL DAILY
Refills: 0 | Status: DISCONTINUED | OUTPATIENT
Start: 2023-02-02 | End: 2023-02-25

## 2023-02-02 RX ORDER — CALCIUM GLUCONATE 100 MG/ML
1 VIAL (ML) INTRAVENOUS ONCE
Refills: 0 | Status: COMPLETED | OUTPATIENT
Start: 2023-02-02 | End: 2023-02-02

## 2023-02-02 RX ADMIN — HEPARIN SODIUM 9000 UNIT(S): 5000 INJECTION INTRAVENOUS; SUBCUTANEOUS at 03:58

## 2023-02-02 RX ADMIN — Medication 100 MILLIEQUIVALENT(S): at 20:17

## 2023-02-02 RX ADMIN — Medication 6: at 06:45

## 2023-02-02 RX ADMIN — Medication 50 MILLIGRAM(S): at 12:25

## 2023-02-02 RX ADMIN — CEFTRIAXONE 100 MILLIGRAM(S): 500 INJECTION, POWDER, FOR SOLUTION INTRAMUSCULAR; INTRAVENOUS at 18:19

## 2023-02-02 RX ADMIN — POLYETHYLENE GLYCOL 3350 17 GRAM(S): 17 POWDER, FOR SOLUTION ORAL at 12:24

## 2023-02-02 RX ADMIN — Medication 6 MILLIGRAM(S): at 06:45

## 2023-02-02 RX ADMIN — Medication 100 MILLIEQUIVALENT(S): at 22:25

## 2023-02-02 RX ADMIN — VASOPRESSIN 6 UNIT(S)/MIN: 20 INJECTION INTRAVENOUS at 12:08

## 2023-02-02 RX ADMIN — HEPARIN SODIUM 1900 UNIT(S)/HR: 5000 INJECTION INTRAVENOUS; SUBCUTANEOUS at 08:18

## 2023-02-02 RX ADMIN — HEPARIN SODIUM 2900 UNIT(S)/HR: 5000 INJECTION INTRAVENOUS; SUBCUTANEOUS at 22:25

## 2023-02-02 RX ADMIN — HEPARIN SODIUM 0 UNIT(S)/HR: 5000 INJECTION INTRAVENOUS; SUBCUTANEOUS at 13:57

## 2023-02-02 RX ADMIN — PANTOPRAZOLE SODIUM 40 MILLIGRAM(S): 20 TABLET, DELAYED RELEASE ORAL at 12:24

## 2023-02-02 RX ADMIN — SENNA PLUS 2 TABLET(S): 8.6 TABLET ORAL at 02:13

## 2023-02-02 RX ADMIN — Medication 100 GRAM(S): at 23:01

## 2023-02-02 RX ADMIN — Medication 250 MILLIGRAM(S): at 09:57

## 2023-02-02 RX ADMIN — Medication 6: at 12:47

## 2023-02-02 RX ADMIN — CHLORHEXIDINE GLUCONATE 1 APPLICATION(S): 213 SOLUTION TOPICAL at 12:47

## 2023-02-02 RX ADMIN — Medication 50 MILLIGRAM(S): at 23:01

## 2023-02-02 RX ADMIN — DEXMEDETOMIDINE HYDROCHLORIDE IN 0.9% SODIUM CHLORIDE 5.5 MICROGRAM(S)/KG/HR: 4 INJECTION INTRAVENOUS at 22:03

## 2023-02-02 RX ADMIN — HEPARIN SODIUM 9000 UNIT(S): 5000 INJECTION INTRAVENOUS; SUBCUTANEOUS at 22:30

## 2023-02-02 RX ADMIN — CHLORHEXIDINE GLUCONATE 15 MILLILITER(S): 213 SOLUTION TOPICAL at 06:06

## 2023-02-02 RX ADMIN — HEPARIN SODIUM 2400 UNIT(S)/HR: 5000 INJECTION INTRAVENOUS; SUBCUTANEOUS at 15:47

## 2023-02-02 RX ADMIN — HEPARIN SODIUM 1900 UNIT(S)/HR: 5000 INJECTION INTRAVENOUS; SUBCUTANEOUS at 01:45

## 2023-02-02 RX ADMIN — Medication 100 MILLIEQUIVALENT(S): at 10:01

## 2023-02-02 RX ADMIN — Medication 50 MILLIGRAM(S): at 18:27

## 2023-02-02 RX ADMIN — Medication 2: at 18:17

## 2023-02-02 RX ADMIN — CHLORHEXIDINE GLUCONATE 15 MILLILITER(S): 213 SOLUTION TOPICAL at 18:28

## 2023-02-02 RX ADMIN — Medication 100 MILLIEQUIVALENT(S): at 18:52

## 2023-02-02 RX ADMIN — Medication 5.16 MICROGRAM(S)/KG/MIN: at 10:46

## 2023-02-02 RX ADMIN — FENTANYL CITRATE 5.5 MICROGRAM(S)/KG/HR: 50 INJECTION INTRAVENOUS at 10:47

## 2023-02-02 RX ADMIN — Medication 125 MILLIGRAM(S): at 10:49

## 2023-02-02 RX ADMIN — SODIUM CHLORIDE 75 MILLILITER(S): 9 INJECTION, SOLUTION INTRAVENOUS at 22:25

## 2023-02-02 RX ADMIN — SENNA PLUS 2 TABLET(S): 8.6 TABLET ORAL at 23:01

## 2023-02-02 RX ADMIN — CEFTRIAXONE 100 MILLIGRAM(S): 500 INJECTION, POWDER, FOR SOLUTION INTRAMUSCULAR; INTRAVENOUS at 08:56

## 2023-02-02 RX ADMIN — HEPARIN SODIUM 2400 UNIT(S)/HR: 5000 INJECTION INTRAVENOUS; SUBCUTANEOUS at 08:52

## 2023-02-02 RX ADMIN — SODIUM CHLORIDE 75 MILLILITER(S): 9 INJECTION, SOLUTION INTRAVENOUS at 10:02

## 2023-02-02 RX ADMIN — Medication 4: at 23:27

## 2023-02-02 RX ADMIN — Medication 5.16 MICROGRAM(S)/KG/MIN: at 18:16

## 2023-02-02 NOTE — PROGRESS NOTE ADULT - SUBJECTIVE AND OBJECTIVE BOX
Nephrology progress note    THIS IS AN INCOMPLETE NOTE . FULL NOTE TO FOLLOW SHORTLY    Patient is seen and examined, events over the last 24 h noted .    Allergies:  No Known Allergies    Hospital Medications:   MEDICATIONS  (STANDING):  cefTRIAXone   IVPB 1000 milliGRAM(s) IV Intermittent every 24 hours  chlorhexidine 0.12% Liquid 15 milliLiter(s) Oral Mucosa every 12 hours  chlorhexidine 2% Cloths 1 Application(s) Topical daily  dexMEDEtomidine Infusion 0.2 MICROgram(s)/kG/Hr (5.5 mL/Hr) IV Continuous <Continuous>  dextrose 5% 1000 milliLiter(s) (75 mL/Hr) IV Continuous <Continuous>  fentaNYL   Infusion. 0.5 MICROgram(s)/kG/Hr (5.5 mL/Hr) IV Continuous <Continuous>  glucagon  Injectable 1 milliGRAM(s) IntraMuscular once  heparin  Infusion.  Unit(s)/Hr (19 mL/Hr) IV Continuous <Continuous>  hydrocortisone sodium succinate Injectable 50 milliGRAM(s) IV Push every 6 hours  insulin lispro (ADMELOG) corrective regimen sliding scale   SubCutaneous every 6 hours  levoFLOXacin IVPB 500 milliGRAM(s) IV Intermittent every 48 hours  norepinephrine Infusion 0.05 MICROgram(s)/kG/Min (5.16 mL/Hr) IV Continuous <Continuous>  pantoprazole  Injectable 40 milliGRAM(s) IV Push daily  polyethylene glycol 3350 17 Gram(s) Oral daily  potassium chloride  20 mEq/100 mL IVPB 20 milliEquivalent(s) IV Intermittent once  senna 2 Tablet(s) Oral at bedtime  sodium bicarbonate  Injectable 100 milliEquivalent(s) IV Push once  vancomycin    Solution 125 milliGRAM(s) Oral once  vancomycin    Solution 125 milliGRAM(s) Oral two times a day  vancomycin  IVPB 2000 milliGRAM(s) IV Intermittent once  vasopressin Infusion 0.04 Unit(s)/Min (6 mL/Hr) IV Continuous <Continuous>        VITALS:  T(F): 98.8 (23 @ 08:00), Max: 98.8 (23 @ 08:00)  HR: 86 (23 @ 08:01)  BP: 112/54 (23 @ 17:45)  RR: 19 (23 @ 08:00)  SpO2: 97% (23 @ 08:01)  Wt(kg): --     @ 07:01  -   @ 07:00  --------------------------------------------------------  IN: 6515 mL / OUT: 625 mL / NET: 5890 mL      Height (cm): 165.1 ( @ 12:05)    PHYSICAL EXAM:  Constitutional: NAD  HEENT: anicteric sclera, oropharynx clear, MMM  Neck: No JVD  Respiratory: CTAB, no wheezes, rales or rhonchi  Cardiovascular: S1, S2, RRR  Gastrointestinal: BS+, soft, NT/ND  Extremities: No cyanosis or clubbing. No peripheral edema  :  No ya.   Skin: No rashes    LABS:      128<L>  |  86<L>  |  76<HH>  ----------------------------<  303<H>  3.3<L>   |  18  |  4.9<HH>    Ca    7.3<L>      2023 08:12  Phos  7.6       Mg     1.9         TPro  5.4<L>  /  Alb  3.1<L>  /  TBili  0.5  /  DBili      /  AST  24  /  ALT  47<H>  /  AlkPhos  266<H>                            11.8   29.84 )-----------( 275      ( 2023 04:30 )             33.0       Urine Studies:  Urinalysis Basic - ( 2023 20:15 )    Color: Orange / Appearance: Turbid / S.012 / pH:   Gluc:  / Ketone: Negative  / Bili: Negative / Urobili: <2 mg/dL   Blood:  / Protein: 300 mg/dL / Nitrite: Negative   Leuk Esterase: Large / RBC: 6 /HPF / WBC >720 /HPF   Sq Epi:  / Non Sq Epi: >27 /HPF / Bacteria: Many          Ferritin 347      [23 @ 10:42]  HbA1c 5.6      [19 @ 07:09]    HCV 0.08, Nonreact      [19 @ 07:37]        RADIOLOGY & ADDITIONAL STUDIES:   Nephrology progress note    Patient is seen and examined, events over the last 24 h noted .  Lying in bed intubated on MV     Allergies:  No Known Allergies    Hospital Medications:   MEDICATIONS  (STANDING):  cefTRIAXone   IVPB 1000 milliGRAM(s) IV Intermittent every 24 hours  dexMEDEtomidine Infusion 0.2 MICROgram(s)/kG/Hr (5.5 mL/Hr) IV Continuous <Continuous>  dextrose 5% 1000 milliLiter(s) (75 mL/Hr) IV Continuous <Continuous>  fentaNYL   Infusion. 0.5 MICROgram(s)/kG/Hr (5.5 mL/Hr) IV Continuous <Continuous>  glucagon  Injectable 1 milliGRAM(s) IntraMuscular once  heparin  Infusion.  Unit(s)/Hr (19 mL/Hr) IV Continuous <Continuous>  hydrocortisone sodium succinate Injectable 50 milliGRAM(s) IV Push every 6 hours  insulin lispro (ADMELOG) corrective regimen sliding scale   SubCutaneous every 6 hours  levoFLOXacin IVPB 500 milliGRAM(s) IV Intermittent every 48 hours  norepinephrine Infusion 0.05 MICROgram(s)/kG/Min (5.16 mL/Hr) IV Continuous <Continuous>  pantoprazole  Injectable 40 milliGRAM(s) IV Push daily  polyethylene glycol 3350 17 Gram(s) Oral daily  potassium chloride  20 mEq/100 mL IVPB 20 milliEquivalent(s) IV Intermittent once  senna 2 Tablet(s) Oral at bedtime  sodium bicarbonate  Injectable 100 milliEquivalent(s) IV Push once  vancomycin    Solution 125 milliGRAM(s) Oral once  vancomycin    Solution 125 milliGRAM(s) Oral two times a day  vancomycin  IVPB 2000 milliGRAM(s) IV Intermittent once  vasopressin Infusion 0.04 Unit(s)/Min (6 mL/Hr) IV Continuous <Continuous>        VITALS:  T(F): 98.8 (23 @ 08:00), Max: 98.8 (23 @ 08:00)  HR: 86 (23 @ 08:01)  BP: 112/54 (23 @ 17:45)  RR: 19 (23 @ 08:00)  SpO2: 97% (23 @ 08:01)      02- @ 07:01  -   @ 07:00  --------------------------------------------------------  IN: 6515 mL / OUT: 625 mL / NET: 5890 mL      Height (cm): 165.1 ( @ 12:05)    PHYSICAL EXAM:  Constitutional: NAD  Neck: No JVD  Respiratory: CTAB,  Cardiovascular: S1, S2, RRR  Gastrointestinal: BS+, soft, NT/ND  Extremities: No cyanosis or clubbing. No peripheral edema  Skin: No rashes    LABS:      128<L>  |  86<L>  |  76<HH>  ----------------------------<  303<H>  3.3<L>   |  18  |  4.9<HH>    Creatinine Trend: 4.9<--, 5.3<--, 5.4<--, 5.1<--  SODIUM TREND:  Sodium 128 [ @ 08:12]  Sodium 126 [ @ 10:10]  Sodium 125 [ @ 05:55]  Sodium 122 [ @ 19:04]    Ca    7.3<L>      2023 08:12  Phos  7.6       Mg     1.9         TPro  5.4<L>  /  Alb  3.1<L>  /  TBili  0.5  /  DBili      /  AST  24  /  ALT  47<H>  /  AlkPhos  266<H>                            11.8   29.84 )-----------( 275      ( 2023 04:30 )             33.0     Ketone - Urine: Negative (23 @ 20:15)      Urine Studies:  Urinalysis Basic - ( 2023 20:15 )    Color: Orange / Appearance: Turbid / S.012 / pH:   Gluc:  / Ketone: Negative  / Bili: Negative / Urobili: <2 mg/dL   Blood:  / Protein: 300 mg/dL / Nitrite: Negative   Leuk Esterase: Large / RBC: 6 /HPF / WBC >720 /HPF   Sq Epi:  / Non Sq Epi: >27 /HPF / Bacteria: Many          Ferritin 347      [23 @ 10:42]  HbA1c 5.6      [19 @ 07:09]    HCV 0.08, Nonreact      [19 @ 07:37]        RADIOLOGY & ADDITIONAL STUDIES:  < from: CT Abdomen and Pelvis No Cont (23 @ 21:17) >    KIDNEYS: Redemonstrated hypertrophic right kidney, congenitally absent   left kidney.    < end of copied text >

## 2023-02-02 NOTE — PROGRESS NOTE ADULT - ASSESSMENT
IMPRESSION:    Acute hypoxemic resp failure still intubated on 80%/ 16  ARDS  Sepsis POA  Septic shock  G+C bacteremia  COVID +  NOL[place]  likely ATN vs prerenal  Possible CAP  HO congentital unilateral kidney  HO A FIb not  on AC?      PLAN:    CNS: Sedate with fentanyl + precedex    HEENT: Oral care    PULMONARY:  HOB @ 45 degrees.  Vent changes as follows: ARDSNET Vent setting. dec , increase RR, monitor P/P/DP, repeat ABG, push ETT, monitor transplumonary PEEP    CARDIOVASCULAR: wean pressors, ECHO    GI: GI prophylaxis. feeding, bowel reginmen    RENAL:  Follow up lytes.  Correct as needed. renal US. renal fup    INFECTIOUS DISEASE: Follow up cultures. rocephin + levaquin. check procal, high prolca    HEMATOLOGICAL: patient has a fib not on ac, hep    ENDOCRINE:  Follow up FS.  Insulin protocol if needed.    MUSCULOSKELETAL: bedrest    MICU.    R fem line  l radial a line

## 2023-02-02 NOTE — PROGRESS NOTE ADULT - SUBJECTIVE AND OBJECTIVE BOX
Over Night Events: events noted, remain critically ill, still intubated, ventilated, on levophed 1.4, vaso 0.04, fentanyl, precedex, bicarb 75 cc/, hep IV    PHYSICAL EXAM    ICU Vital Signs Last 24 Hrs  T(C): 37.1 (2023 08:00), Max: 37.1 (2023 08:00)  T(F): 98.8 (2023 08:00), Max: 98.8 (2023 08:00)  HR: 86 (2023 08:01) (58 - 88)  BP: 112/54 (2023 17:45) (84/43 - 128/47)  BP(mean): 78 (2023 17:45) (54 - 91)  ABP: 122/52 (2023 08:00) (68/32 - 132/56)  ABP(mean): 72 (2023 08:00) (44 - 94)  RR: 19 (:00) (9 - 30)  SpO2: 97% (2023 08:01) (88% - 100%)    O2 Parameters below as of 2023 08:00  Patient On (Oxygen Delivery Method): conventional ventilator    O2 Concentration (%): 80        General: ILL [place]  HEENT: ETT        Lungs: Bilateral Rhonchi  Cardiovascular: EARLENE 2/  Abdomen: Soft, Positive BS  Extremities: No clubbing   dti    23 @ 07:01  -  23 @ 07:00  --------------------------------------------------------  IN:    Dexmedetomidine: 329.5 mL    dextrose 5% w/ Additives: 2550 mL    FentaNYL: 313.5 mL    Heparin Infusion: 404 mL    IV PiggyBack: 150 mL    Norepinephrine: 898 mL    Norepinephrine: 1732 mL    Vasopressin: 138 mL  Total IN: 6515 mL    OUT:    Indwelling Catheter - Urethral (mL): 625 mL  Total OUT: 625 mL    Total NET: 5890 mL          LABS:                          11.8   29.84 )-----------( 275      ( 2023 04:30 )             33.0                                               02-    126<L>  |  87<L>  |  75<HH>  ----------------------------<  340<H>  3.8   |  17  |  5.3<HH>    Ca    7.3<L>      2023 10:10  Phos  7.6     02-  Mg     1.9     -    TPro  5.4<L>  /  Alb  3.1<L>  /  TBili  0.5  /  DBili  x   /  AST  24  /  ALT  47<H>  /  AlkPhos  266<H>  -      PT/INR - ( 2023 02:21 )   PT: 13.20 sec;   INR: 1.15 ratio         PTT - ( 2023 23:54 )  PTT:34.2 sec                                       Urinalysis Basic - ( 2023 20:15 )    Color: Orange / Appearance: Turbid / S.012 / pH: x  Gluc: x / Ketone: Negative  / Bili: Negative / Urobili: <2 mg/dL   Blood: x / Protein: 300 mg/dL / Nitrite: Negative   Leuk Esterase: Large / RBC: 6 /HPF / WBC >720 /HPF   Sq Epi: x / Non Sq Epi: >27 /HPF / Bacteria: Many        CARDIAC MARKERS ( 2023 10:42 )  x     / 0.08 ng/mL / 107 U/L / x     / x      CARDIAC MARKERS ( 2023 05:55 )  x     / x     / 139 U/L / x     / x      CARDIAC MARKERS ( 2023 19:04 )  x     / 0.12 ng/mL / x     / x     / x                                                LIVER FUNCTIONS - ( 2023 05:55 )  Alb: 3.1 g/dL / Pro: 5.4 g/dL / ALK PHOS: 266 U/L / ALT: 47 U/L / AST: 24 U/L / GGT: x                                                  Culture - Urine (collected 2023 20:15)  Source: Clean Catch Clean Catch (Midstream)  Preliminary Report (2023 08:23):    >100,000 CFU/ml Gram Negative Rods    Culture - Blood (collected 2023 19:03)  Source: .Blood Blood-Peripheral  Preliminary Report (2023 01:02):    No growth to date.    Culture - Blood (collected 2023 19:03)  Source: .Blood Blood-Peripheral  Gram Stain (2023 08:28):    Growth in aerobic bottle: Gram Positive Cocci in Clusters  Preliminary Report (2023 08:29):    Growth in aerobic bottle: Gram Positive Cocci in Clusters    ***Blood Panel PCR results on this specimen are available    approximately 3 hours after the Gram stain result.***    Gram stain, PCR, and/or culture results may not always    correspond due to difference in methodologies.    ************************************************************    This PCR assay was performed by multiplex PCR. This    Assay tests for 66 bacterial and resistance gene targets.    Please refer to the [facility] test directory    at [email or web address] for details.                                                   Mode: AC/ CMV (Assist Control/ Continuous Mandatory Ventilation)  RR (machine): 28  TV (machine): 400  FiO2: 80  PEEP: 16  ITime: 1  MAP: 22  PIP: 37                                      ABG - ( 2023 03:55 )  pH, Arterial: 7.28  pH, Blood: x     /  pCO2: 44    /  pO2: 135   / HCO3: 21    / Base Excess: -5.9  /  SaO2: 96.8      LA 2.5          MEDICATIONS  (STANDING):  cefTRIAXone   IVPB 1000 milliGRAM(s) IV Intermittent every 24 hours  chlorhexidine 0.12% Liquid 15 milliLiter(s) Oral Mucosa every 12 hours  chlorhexidine 2% Cloths 1 Application(s) Topical daily  dexAMETHasone  Injectable 6 milliGRAM(s) IV Push daily  dexMEDEtomidine Infusion 0.2 MICROgram(s)/kG/Hr (5.5 mL/Hr) IV Continuous <Continuous>  dextrose 5% 1000 milliLiter(s) (75 mL/Hr) IV Continuous <Continuous>  fentaNYL   Infusion. 0.5 MICROgram(s)/kG/Hr (5.5 mL/Hr) IV Continuous <Continuous>  glucagon  Injectable 1 milliGRAM(s) IntraMuscular once  heparin  Infusion.  Unit(s)/Hr (19 mL/Hr) IV Continuous <Continuous>  insulin lispro (ADMELOG) corrective regimen sliding scale   SubCutaneous every 6 hours  levoFLOXacin IVPB 500 milliGRAM(s) IV Intermittent every 48 hours  norepinephrine Infusion 0.05 MICROgram(s)/kG/Min (5.16 mL/Hr) IV Continuous <Continuous>  pantoprazole  Injectable 40 milliGRAM(s) IV Push daily  polyethylene glycol 3350 17 Gram(s) Oral daily  senna 2 Tablet(s) Oral at bedtime  sodium bicarbonate  Injectable 100 milliEquivalent(s) IV Push once  vancomycin    Solution 125 milliGRAM(s) Oral two times a day  vasopressin Infusion 0.04 Unit(s)/Min (6 mL/Hr) IV Continuous <Continuous>    MEDICATIONS  (PRN):  acetaminophen  Suppository .. 650 milliGRAM(s) Rectal every 4 hours PRN Temp greater or equal to 38.5C (101.3F)  heparin   Injectable 9000 Unit(s) IV Push every 6 hours PRN For aPTT less than 40  heparin   Injectable 4000 Unit(s) IV Push every 6 hours PRN For aPTT between 40 - 57    cxr reviewed

## 2023-02-02 NOTE — PROGRESS NOTE ADULT - SUBJECTIVE AND OBJECTIVE BOX
Patient is a 72y old  Female who presents with a chief complaint of respiratory distress (2023 09:00)      INTERVAL HPI/OVERNIGHT EVENTS:   No overnight events   Afebrile, hemodynamically stable     ICU Vital Signs Last 24 Hrs  T(C): 37.1 (2023 08:00), Max: 37.1 (2023 08:00)  T(F): 98.8 (2023 08:00), Max: 98.8 (2023 08:00)  HR: 84 (2023 11:00) (60 - 88)  BP: 112/54 (2023 17:45) (96/56 - 128/47)  BP(mean): 78 (2023 17:45) (58 - 91)  ABP: 116/50 (2023 11:00) (68/32 - 132/56)  ABP(mean): 70 (:) (44 - 94)  RR: 26 (:) (9 - 30)  SpO2: 99% (2023 11:00) (91% - 100%)    O2 Parameters below as of 2023 08:00  Patient On (Oxygen Delivery Method): conventional ventilator    O2 Concentration (%): 80      I&O's Summary    2023 07:01  -  2023 07:00  --------------------------------------------------------  IN: 6515 mL / OUT: 625 mL / NET: 5890 mL    2023 07:01  -  2023 13:01  --------------------------------------------------------  IN: 1462.5 mL / OUT: 160 mL / NET: 1302.5 mL      Mode: AC/ CMV (Assist Control/ Continuous Mandatory Ventilation)  RR (machine): 32  TV (machine): 370  FiO2: 80  PEEP: 16  ITime: 1  MAP: 22  PIP: 37      LABS:                        11.8   29.84 )-----------( 275      ( 2023 04:30 )             33.0     02-02    128<L>  |  86<L>  |  76<HH>  ----------------------------<  303<H>  3.3<L>   |  18  |  4.9<HH>    Ca    7.3<L>      2023 08:12  Phos  7.6       Mg     1.9         TPro  5.4<L>  /  Alb  3.1<L>  /  TBili  0.5  /  DBili  x   /  AST  24  /  ALT  47<H>  /  AlkPhos  266<H>      PT/INR - ( 2023 02:21 )   PT: 13.20 sec;   INR: 1.15 ratio         PTT - ( 2023 23:54 )  PTT:34.2 sec  Urinalysis Basic - ( 2023 20:15 )    Color: Orange / Appearance: Turbid / S.012 / pH: x  Gluc: x / Ketone: Negative  / Bili: Negative / Urobili: <2 mg/dL   Blood: x / Protein: 300 mg/dL / Nitrite: Negative   Leuk Esterase: Large / RBC: 6 /HPF / WBC >720 /HPF   Sq Epi: x / Non Sq Epi: >27 /HPF / Bacteria: Many      CAPILLARY BLOOD GLUCOSE      POCT Blood Glucose.: 267 mg/dL (2023 12:45)  POCT Blood Glucose.: 287 mg/dL (2023 06:42)  POCT Blood Glucose.: 271 mg/dL (2023 00:29)    ABG - ( 2023 10:46 )  pH, Arterial: 7.28  pH, Blood: x     /  pCO2: 46    /  pO2: 121   / HCO3: 22    / Base Excess: -5.2  /  SaO2: 97.3                RADIOLOGY & ADDITIONAL TESTS:    Consultant(s) Notes Reviewed:  [x ] YES  [ ] NO    MEDICATIONS  (STANDING):  cefTRIAXone   IVPB 1000 milliGRAM(s) IV Intermittent every 24 hours  chlorhexidine 0.12% Liquid 15 milliLiter(s) Oral Mucosa every 12 hours  chlorhexidine 2% Cloths 1 Application(s) Topical daily  dexMEDEtomidine Infusion 0.2 MICROgram(s)/kG/Hr (5.5 mL/Hr) IV Continuous <Continuous>  dextrose 5% 1000 milliLiter(s) (75 mL/Hr) IV Continuous <Continuous>  fentaNYL   Infusion. 0.5 MICROgram(s)/kG/Hr (5.5 mL/Hr) IV Continuous <Continuous>  glucagon  Injectable 1 milliGRAM(s) IntraMuscular once  heparin  Infusion.  Unit(s)/Hr (19 mL/Hr) IV Continuous <Continuous>  hydrocortisone sodium succinate Injectable 50 milliGRAM(s) IV Push every 6 hours  insulin lispro (ADMELOG) corrective regimen sliding scale   SubCutaneous every 6 hours  levoFLOXacin IVPB 500 milliGRAM(s) IV Intermittent every 48 hours  norepinephrine Infusion 0.05 MICROgram(s)/kG/Min (5.16 mL/Hr) IV Continuous <Continuous>  pantoprazole  Injectable 40 milliGRAM(s) IV Push daily  polyethylene glycol 3350 17 Gram(s) Oral daily  senna 2 Tablet(s) Oral at bedtime  sodium bicarbonate  Injectable 100 milliEquivalent(s) IV Push once  vancomycin    Solution 125 milliGRAM(s) Oral two times a day  vasopressin Infusion 0.04 Unit(s)/Min (6 mL/Hr) IV Continuous <Continuous>    MEDICATIONS  (PRN):  acetaminophen  Suppository .. 650 milliGRAM(s) Rectal every 4 hours PRN Temp greater or equal to 38.5C (101.3F)  heparin   Injectable 9000 Unit(s) IV Push every 6 hours PRN For aPTT less than 40  heparin   Injectable 4000 Unit(s) IV Push every 6 hours PRN For aPTT between 40 - 57      PHYSICAL EXAM:  GENERAL: intubated, sedated  HEAD:  Atraumatic, Normocephalic  EYES: 1mm, EOMI, PERRLA, conjunctiva and sclera clear  NECK: Supple, No JVD, Normal thyroid, no enlarged nodes  NERVOUS SYSTEM:  Alert & Awake. Following commands  CHEST/LUNG: B/L good air entry; No rales, rhonchi, or wheezing  HEART: S1S2 normal, no S3, Regular rate and rhythm; No murmurs  ABDOMEN: Soft, Nondistended; Bowel sounds present  EXTREMITIES:  2+ Peripheral Pulses, No clubbing, cyanosis, or edema  LYMPH: No lymphadenopathy noted  SKIN: No rashes or lesions    Care Discussed with Consultants/Other Providers [ x] YES  [ ] NO

## 2023-02-02 NOTE — PROGRESS NOTE ADULT - ASSESSMENT
NOLA / last creat 0.9 on 1/3/23 per HIE / hx of solitary kidney   Likely ATN (hypotension, COVID19/ARDS) / CRS / heavy NSAID use  Hyponatremia improving with iv hydration  HAGMA d/t renal failure / r/o lactic acidosis/DKA  Acute respiratory failure / pulmonary edema / COVID19 / ARDS   Septic shock on pressors     - oliguric  - creat up-trending- stable now   - on MV  - on pressors    Recommendations:   - lasix 40 mg Iv q12h   - strict i/o   - avoid hypotonic infusions/ monitor Na level  - check phos level, CPK noted ok  - UA noted, f/u cultures, on ceftriaxone/levaquin vanco and dexamethasone- dose vanco to trough   - monitor ionized calcium level closely   Replete as needed to level > 1   - no acute indication for RRT yet today   - hypertrophic right kidney on CT   - poor prognosis

## 2023-02-02 NOTE — PROCEDURAL SAFETY CHECKLIST WITH OR WITHOUT SEDATION - [id number]_ALL_CORE
Pt states she is ready to go home and does not want to wait for the final read of her CT scan.  She has no further questions and reports her pain is a zero out of 10.  
done
done

## 2023-02-03 LAB
-  AMIKACIN: SIGNIFICANT CHANGE UP
-  AMOXICILLIN/CLAVULANIC ACID: SIGNIFICANT CHANGE UP
-  AMPICILLIN/SULBACTAM: SIGNIFICANT CHANGE UP
-  AMPICILLIN: SIGNIFICANT CHANGE UP
-  AZTREONAM: SIGNIFICANT CHANGE UP
-  CEFAZOLIN: SIGNIFICANT CHANGE UP
-  CEFEPIME: SIGNIFICANT CHANGE UP
-  CEFTRIAXONE: SIGNIFICANT CHANGE UP
-  CEFUROXIME: SIGNIFICANT CHANGE UP
-  CIPROFLOXACIN: SIGNIFICANT CHANGE UP
-  ERTAPENEM: SIGNIFICANT CHANGE UP
-  GENTAMICIN: SIGNIFICANT CHANGE UP
-  LEVOFLOXACIN: SIGNIFICANT CHANGE UP
-  MEROPENEM: SIGNIFICANT CHANGE UP
-  NITROFURANTOIN: SIGNIFICANT CHANGE UP
-  PIPERACILLIN/TAZOBACTAM: SIGNIFICANT CHANGE UP
-  TOBRAMYCIN: SIGNIFICANT CHANGE UP
-  TRIMETHOPRIM/SULFAMETHOXAZOLE: SIGNIFICANT CHANGE UP
ALBUMIN SERPL ELPH-MCNC: 2.7 G/DL — LOW (ref 3.5–5.2)
ALP SERPL-CCNC: 170 U/L — HIGH (ref 30–115)
ALT FLD-CCNC: 22 U/L — SIGNIFICANT CHANGE UP (ref 0–41)
ANION GAP SERPL CALC-SCNC: 20 MMOL/L — HIGH (ref 7–14)
ANION GAP SERPL CALC-SCNC: 22 MMOL/L — HIGH (ref 7–14)
APTT BLD: 199.6 SEC — CRITICAL HIGH (ref 27–39.2)
APTT BLD: >200 SEC — CRITICAL HIGH (ref 27–39.2)
APTT BLD: >200 SEC — CRITICAL HIGH (ref 27–39.2)
AST SERPL-CCNC: 16 U/L — SIGNIFICANT CHANGE UP (ref 0–41)
BASOPHILS # BLD AUTO: 0.04 K/UL — SIGNIFICANT CHANGE UP (ref 0–0.2)
BASOPHILS NFR BLD AUTO: 0.1 % — SIGNIFICANT CHANGE UP (ref 0–1)
BILIRUB SERPL-MCNC: 0.5 MG/DL — SIGNIFICANT CHANGE UP (ref 0.2–1.2)
BUN SERPL-MCNC: 72 MG/DL — CRITICAL HIGH (ref 10–20)
BUN SERPL-MCNC: 75 MG/DL — CRITICAL HIGH (ref 10–20)
CALCIUM SERPL-MCNC: 8.7 MG/DL — SIGNIFICANT CHANGE UP (ref 8.4–10.5)
CALCIUM SERPL-MCNC: 9.7 MG/DL — SIGNIFICANT CHANGE UP (ref 8.4–10.5)
CHLORIDE SERPL-SCNC: 85 MMOL/L — LOW (ref 98–110)
CHLORIDE SERPL-SCNC: 86 MMOL/L — LOW (ref 98–110)
CO2 SERPL-SCNC: 21 MMOL/L — SIGNIFICANT CHANGE UP (ref 17–32)
CO2 SERPL-SCNC: 22 MMOL/L — SIGNIFICANT CHANGE UP (ref 17–32)
CREAT SERPL-MCNC: 4.2 MG/DL — CRITICAL HIGH (ref 0.7–1.5)
CREAT SERPL-MCNC: 4.8 MG/DL — CRITICAL HIGH (ref 0.7–1.5)
CULTURE RESULTS: SIGNIFICANT CHANGE UP
EGFR: 11 ML/MIN/1.73M2 — LOW
EGFR: 9 ML/MIN/1.73M2 — LOW
EOSINOPHIL # BLD AUTO: 0 K/UL — SIGNIFICANT CHANGE UP (ref 0–0.7)
EOSINOPHIL NFR BLD AUTO: 0 % — SIGNIFICANT CHANGE UP (ref 0–8)
GAS PNL BLDA: SIGNIFICANT CHANGE UP
GLUCOSE BLDC GLUCOMTR-MCNC: 177 MG/DL — HIGH (ref 70–99)
GLUCOSE BLDC GLUCOMTR-MCNC: 200 MG/DL — HIGH (ref 70–99)
GLUCOSE BLDC GLUCOMTR-MCNC: 216 MG/DL — HIGH (ref 70–99)
GLUCOSE BLDC GLUCOMTR-MCNC: 227 MG/DL — HIGH (ref 70–99)
GLUCOSE SERPL-MCNC: 209 MG/DL — HIGH (ref 70–99)
GLUCOSE SERPL-MCNC: 218 MG/DL — HIGH (ref 70–99)
GRAM STN FLD: SIGNIFICANT CHANGE UP
HCT VFR BLD CALC: 30.4 % — LOW (ref 37–47)
HGB BLD-MCNC: 10.9 G/DL — LOW (ref 12–16)
IMM GRANULOCYTES NFR BLD AUTO: 1.1 % — HIGH (ref 0.1–0.3)
LYMPHOCYTES # BLD AUTO: 1.32 K/UL — SIGNIFICANT CHANGE UP (ref 1.2–3.4)
LYMPHOCYTES # BLD AUTO: 4.5 % — LOW (ref 20.5–51.1)
MAGNESIUM SERPL-MCNC: 1.6 MG/DL — LOW (ref 1.8–2.4)
MCHC RBC-ENTMCNC: 29.1 PG — SIGNIFICANT CHANGE UP (ref 27–31)
MCHC RBC-ENTMCNC: 35.9 G/DL — SIGNIFICANT CHANGE UP (ref 32–37)
MCV RBC AUTO: 81.1 FL — SIGNIFICANT CHANGE UP (ref 81–99)
METHOD TYPE: SIGNIFICANT CHANGE UP
MONOCYTES # BLD AUTO: 1.68 K/UL — HIGH (ref 0.1–0.6)
MONOCYTES NFR BLD AUTO: 5.8 % — SIGNIFICANT CHANGE UP (ref 1.7–9.3)
NEUTROPHILS # BLD AUTO: 25.73 K/UL — HIGH (ref 1.4–6.5)
NEUTROPHILS NFR BLD AUTO: 88.5 % — HIGH (ref 42.2–75.2)
NRBC # BLD: 0 /100 WBCS — SIGNIFICANT CHANGE UP (ref 0–0)
ORGANISM # SPEC MICROSCOPIC CNT: SIGNIFICANT CHANGE UP
ORGANISM # SPEC MICROSCOPIC CNT: SIGNIFICANT CHANGE UP
PHOSPHATE SERPL-MCNC: 6.6 MG/DL — HIGH (ref 2.1–4.9)
PLATELET # BLD AUTO: 270 K/UL — SIGNIFICANT CHANGE UP (ref 130–400)
POTASSIUM SERPL-MCNC: 3.3 MMOL/L — LOW (ref 3.5–5)
POTASSIUM SERPL-MCNC: 3.6 MMOL/L — SIGNIFICANT CHANGE UP (ref 3.5–5)
POTASSIUM SERPL-SCNC: 3.3 MMOL/L — LOW (ref 3.5–5)
POTASSIUM SERPL-SCNC: 3.6 MMOL/L — SIGNIFICANT CHANGE UP (ref 3.5–5)
PROCALCITONIN SERPL-MCNC: 49.6 NG/ML — HIGH (ref 0.02–0.1)
PROT SERPL-MCNC: 4.8 G/DL — LOW (ref 6–8)
RBC # BLD: 3.75 M/UL — LOW (ref 4.2–5.4)
RBC # FLD: 12.9 % — SIGNIFICANT CHANGE UP (ref 11.5–14.5)
S PNEUM AG UR QL: NEGATIVE — SIGNIFICANT CHANGE UP
SODIUM SERPL-SCNC: 128 MMOL/L — LOW (ref 135–146)
SODIUM SERPL-SCNC: 128 MMOL/L — LOW (ref 135–146)
SPECIMEN SOURCE: SIGNIFICANT CHANGE UP
SPECIMEN SOURCE: SIGNIFICANT CHANGE UP
WBC # BLD: 29.1 K/UL — HIGH (ref 4.8–10.8)
WBC # FLD AUTO: 29.1 K/UL — HIGH (ref 4.8–10.8)

## 2023-02-03 PROCEDURE — 99291 CRITICAL CARE FIRST HOUR: CPT

## 2023-02-03 PROCEDURE — 74018 RADEX ABDOMEN 1 VIEW: CPT | Mod: 26

## 2023-02-03 PROCEDURE — 93010 ELECTROCARDIOGRAM REPORT: CPT

## 2023-02-03 PROCEDURE — 71045 X-RAY EXAM CHEST 1 VIEW: CPT | Mod: 26

## 2023-02-03 RX ORDER — MEROPENEM 1 G/30ML
500 INJECTION INTRAVENOUS EVERY 24 HOURS
Refills: 0 | Status: DISCONTINUED | OUTPATIENT
Start: 2023-02-04 | End: 2023-02-04

## 2023-02-03 RX ORDER — MAGNESIUM SULFATE 500 MG/ML
2 VIAL (ML) INJECTION ONCE
Refills: 0 | Status: COMPLETED | OUTPATIENT
Start: 2023-02-03 | End: 2023-02-03

## 2023-02-03 RX ORDER — MEROPENEM 1 G/30ML
500 INJECTION INTRAVENOUS EVERY 12 HOURS
Refills: 0 | Status: DISCONTINUED | OUTPATIENT
Start: 2023-02-03 | End: 2023-02-03

## 2023-02-03 RX ORDER — POTASSIUM CHLORIDE 20 MEQ
20 PACKET (EA) ORAL ONCE
Refills: 0 | Status: COMPLETED | OUTPATIENT
Start: 2023-02-03 | End: 2023-02-03

## 2023-02-03 RX ORDER — MAGNESIUM SULFATE 500 MG/ML
4 VIAL (ML) INJECTION ONCE
Refills: 0 | Status: DISCONTINUED | OUTPATIENT
Start: 2023-02-03 | End: 2023-02-03

## 2023-02-03 RX ORDER — MAGNESIUM SULFATE 500 MG/ML
2 VIAL (ML) INJECTION
Refills: 0 | Status: COMPLETED | OUTPATIENT
Start: 2023-02-03 | End: 2023-02-03

## 2023-02-03 RX ADMIN — POLYETHYLENE GLYCOL 3350 17 GRAM(S): 17 POWDER, FOR SOLUTION ORAL at 12:46

## 2023-02-03 RX ADMIN — Medication 50 MILLIGRAM(S): at 05:03

## 2023-02-03 RX ADMIN — Medication 25 GRAM(S): at 10:33

## 2023-02-03 RX ADMIN — HEPARIN SODIUM 2600 UNIT(S)/HR: 5000 INJECTION INTRAVENOUS; SUBCUTANEOUS at 12:43

## 2023-02-03 RX ADMIN — Medication 2: at 12:48

## 2023-02-03 RX ADMIN — MEROPENEM 100 MILLIGRAM(S): 1 INJECTION INTRAVENOUS at 10:56

## 2023-02-03 RX ADMIN — HEPARIN SODIUM 0 UNIT(S)/HR: 5000 INJECTION INTRAVENOUS; SUBCUTANEOUS at 06:50

## 2023-02-03 RX ADMIN — SENNA PLUS 2 TABLET(S): 8.6 TABLET ORAL at 23:18

## 2023-02-03 RX ADMIN — HEPARIN SODIUM 0 UNIT(S)/HR: 5000 INJECTION INTRAVENOUS; SUBCUTANEOUS at 23:56

## 2023-02-03 RX ADMIN — PANTOPRAZOLE SODIUM 40 MILLIGRAM(S): 20 TABLET, DELAYED RELEASE ORAL at 12:46

## 2023-02-03 RX ADMIN — CHLORHEXIDINE GLUCONATE 15 MILLILITER(S): 213 SOLUTION TOPICAL at 05:03

## 2023-02-03 RX ADMIN — Medication 100 MILLIEQUIVALENT(S): at 17:37

## 2023-02-03 RX ADMIN — Medication 4: at 23:18

## 2023-02-03 RX ADMIN — HEPARIN SODIUM 2300 UNIT(S)/HR: 5000 INJECTION INTRAVENOUS; SUBCUTANEOUS at 16:20

## 2023-02-03 RX ADMIN — VASOPRESSIN 6 UNIT(S)/MIN: 20 INJECTION INTRAVENOUS at 18:37

## 2023-02-03 RX ADMIN — CHLORHEXIDINE GLUCONATE 1 APPLICATION(S): 213 SOLUTION TOPICAL at 12:48

## 2023-02-03 RX ADMIN — Medication 50 MILLIGRAM(S): at 23:18

## 2023-02-03 RX ADMIN — CHLORHEXIDINE GLUCONATE 15 MILLILITER(S): 213 SOLUTION TOPICAL at 17:37

## 2023-02-03 RX ADMIN — HEPARIN SODIUM 0 UNIT(S)/HR: 5000 INJECTION INTRAVENOUS; SUBCUTANEOUS at 15:02

## 2023-02-03 RX ADMIN — HEPARIN SODIUM 2600 UNIT(S)/HR: 5000 INJECTION INTRAVENOUS; SUBCUTANEOUS at 07:58

## 2023-02-03 RX ADMIN — Medication 50 MILLIGRAM(S): at 17:36

## 2023-02-03 RX ADMIN — Medication 4: at 05:18

## 2023-02-03 RX ADMIN — Medication 50 MILLIGRAM(S): at 14:51

## 2023-02-03 RX ADMIN — Medication 2: at 17:52

## 2023-02-03 RX ADMIN — DEXMEDETOMIDINE HYDROCHLORIDE IN 0.9% SODIUM CHLORIDE 5.5 MICROGRAM(S)/KG/HR: 4 INJECTION INTRAVENOUS at 16:35

## 2023-02-03 RX ADMIN — Medication 25 GRAM(S): at 16:36

## 2023-02-03 RX ADMIN — Medication 25 GRAM(S): at 14:23

## 2023-02-03 RX ADMIN — FENTANYL CITRATE 5.5 MICROGRAM(S)/KG/HR: 50 INJECTION INTRAVENOUS at 07:18

## 2023-02-03 NOTE — PROGRESS NOTE ADULT - ASSESSMENT
NOLA / last creat 0.9 on 1/3/23 per HIE / hx of solitary kidney   Likely ATN (hypotension, COVID19/ARDS) / CRS / heavy NSAID use  Hyponatremia improving with iv hydration  HAGMA d/t renal failure / r/o lactic acidosis/DKA  Acute respiratory failure / pulmonary edema / COVID19 / ARDS   Septic shock on pressors     - non oliguric   - creat trending down    - on MV  - on pressors    Recommendations:   - lasix was held / keep on hold and follow UOP  - strict i/o   - avoid hypotonic infusions/ monitor Na level  -  CPK noted ok  - Phos level noted with Ca start renvela one tab q8h  - UA noted, f/u cultures, Covid pos /on ceftriaxone/levaquin and dexamethasone-   - no acute indication for RRT   - hypertrophic right kidney on CT / will need repeat sono in 24-48h  - will follow

## 2023-02-03 NOTE — PROGRESS NOTE ADULT - ASSESSMENT
71 yo female kth afib, htn, solitary kidney presented from [facility] for SOB with COVID four days prior to admission     #Acute Hypoxic Respiratory Failure  #ARDS  - CT Chest No Cont (01.31.23 @ 21:17):  Mild right pleural effusion with adjacent  atelectasis. Bibasilar atelectatic changes versus scarring. Partial   obstruction of the distal right lower lobe bronchioles. Otherwise no  evidence of central endobronchial obstruction. No focal consolidation.  Right upper lobe pulmonary cyst (4-130). No pleural effusion. No  pneumothorax.  - Xray Chest 1 View-PORTABLE IMMEDIATE (Xray Chest 1 View-PORTABLE IMMEDIATE .) (02.01.23 @ 06:31):  Worsening opacifications and effusions. Support devices as described.    #Septic Shock secondary to COVID/UTI   - Blood Cx 1/31 Coag NEGATIVE staph - likely contaminant     #Proteus ESBL UTI - Urine Cx 1/31     #COVID-19 severe  #NOLA     #Solitary Kidney     Recommendations  - continue meropenem 500 mg q 24 hours   - stop levofloxacin   - wean pressors as tolerated   - continue dex 6 mg daily for 10 days for severe COVID     Please call or message on [employer] Teams if with any questions.  Spectra [id number]

## 2023-02-03 NOTE — PROGRESS NOTE ADULT - ASSESSMENT
IMPRESSION:    Acute hypoxemic resp failure still intubated on 80%/ 16  ARDS  Sepsis POA  Septic shock  ESBL UTI  G+C bacteremia/ COAG - STAP  COVID +  NOLA  likely ATN vs prerenal imroving  Possible CAP  HO congentital unilateral kidney  HO A FIb not  on AC?      PLAN:    CNS: Sedate with fentanyl + precedex, DAILY sat    HEENT: Oral care    PULMONARY:  HOB @ 45 degrees.  Vent changes as follows: ARDSNET Vent setting.  , , monitor P/P/DP, repeat  CXR, monitor transplumonary PEEP    CARDIOVASCULAR: wean pressors, ECHO, DC BICARB DRIP    GI: GI prophylaxis. feeding, bowel regimen    RENAL:  Follow up lytes.  Correct as needed. renal US. renal fup    INFECTIOUS DISEASE: Follow up cultures. Meropenem, ID eval    HEMATOLOGICAL: patient has a fib not on ac, hep    ENDOCRINE:  Follow up FS.  Insulin protocol if needed.    MUSCULOSKELETAL: bedrest    MICU.  L IJ/ ya  l radial a line

## 2023-02-03 NOTE — PROGRESS NOTE ADULT - SUBJECTIVE AND OBJECTIVE BOX
Patient is a 72y old  Female who presents with a chief complaint of respiratory distress (03 Feb 2023 09:25)      INTERVAL HPI/OVERNIGHT EVENTS:   No overnight events   Afebrile, hemodynamically stable     ICU Vital Signs Last 24 Hrs  T(C): 37.2 (03 Feb 2023 08:00), Max: 37.4 (03 Feb 2023 04:00)  T(F): 99 (03 Feb 2023 08:00), Max: 99.4 (03 Feb 2023 04:00)  HR: 66 (03 Feb 2023 11:00) (62 - 86)  BP: --  BP(mean): --  ABP: 118/48 (03 Feb 2023 11:00) (116/44 - 142/62)  ABP(mean): 66 (03 Feb 2023 11:00) (64 - 86)  RR: 33 (03 Feb 2023 11:00) (0 - 33)  SpO2: 99% (03 Feb 2023 11:00) (98% - 100%)    O2 Parameters below as of 03 Feb 2023 08:00  Patient On (Oxygen Delivery Method): ventilator    O2 Concentration (%): 80      I&O's Summary    02 Feb 2023 07:01  -  03 Feb 2023 07:00  --------------------------------------------------------  IN: 6575.4 mL / OUT: 2095 mL / NET: 4480.4 mL    03 Feb 2023 07:01  -  03 Feb 2023 11:39  --------------------------------------------------------  IN: 817.9 mL / OUT: 515 mL / NET: 302.9 mL      Mode: AC/ CMV (Assist Control/ Continuous Mandatory Ventilation)  RR (machine): 32  TV (machine): 370  FiO2: 70  PEEP: 16  ITime: 1  MAP: 22  PIP: 32      LABS:                        10.9   29.10 )-----------( 270      ( 03 Feb 2023 05:44 )             30.4     02-03    128<L>  |  86<L>  |  72<HH>  ----------------------------<  218<H>  3.3<L>   |  22  |  4.2<HH>    Ca    9.7      03 Feb 2023 05:44  Phos  6.6     02-03  Mg     1.6     02-03    TPro  4.8<L>  /  Alb  2.7<L>  /  TBili  0.5  /  DBili  x   /  AST  16  /  ALT  22  /  AlkPhos  170<H>  02-03    PTT - ( 03 Feb 2023 05:44 )  PTT:>200.0 sec    CAPILLARY BLOOD GLUCOSE      POCT Blood Glucose.: 227 mg/dL (03 Feb 2023 05:07)  POCT Blood Glucose.: 212 mg/dL (02 Feb 2023 23:05)  POCT Blood Glucose.: 173 mg/dL (02 Feb 2023 17:34)  POCT Blood Glucose.: 267 mg/dL (02 Feb 2023 12:45)    ABG - ( 03 Feb 2023 04:10 )  pH, Arterial: 7.36  pH, Blood: x     /  pCO2: 42    /  pO2: 108   / HCO3: 24    / Base Excess: -1.7  /  SaO2: 96.7                RADIOLOGY & ADDITIONAL TESTS:    Consultant(s) Notes Reviewed:  [x ] YES  [ ] NO    MEDICATIONS  (STANDING):  chlorhexidine 0.12% Liquid 15 milliLiter(s) Oral Mucosa every 12 hours  chlorhexidine 2% Cloths 1 Application(s) Topical daily  dexMEDEtomidine Infusion 0.2 MICROgram(s)/kG/Hr (5.5 mL/Hr) IV Continuous <Continuous>  fentaNYL   Infusion. 0.5 MICROgram(s)/kG/Hr (5.5 mL/Hr) IV Continuous <Continuous>  glucagon  Injectable 1 milliGRAM(s) IntraMuscular once  heparin  Infusion. 2400 Unit(s)/Hr (24 mL/Hr) IV Continuous <Continuous>  hydrocortisone sodium succinate Injectable 50 milliGRAM(s) IV Push every 6 hours  insulin lispro (ADMELOG) corrective regimen sliding scale   SubCutaneous every 6 hours  levoFLOXacin IVPB 500 milliGRAM(s) IV Intermittent every 48 hours  magnesium sulfate  IVPB 2 Gram(s) IV Intermittent every 2 hours  meropenem  IVPB 500 milliGRAM(s) IV Intermittent every 12 hours  norepinephrine Infusion 0.05 MICROgram(s)/kG/Min (5.16 mL/Hr) IV Continuous <Continuous>  pantoprazole  Injectable 40 milliGRAM(s) IV Push daily  polyethylene glycol 3350 17 Gram(s) Oral daily  potassium chloride  20 mEq/100 mL IVPB 20 milliEquivalent(s) IV Intermittent once  senna 2 Tablet(s) Oral at bedtime  sodium bicarbonate  Injectable 100 milliEquivalent(s) IV Push once  vasopressin Infusion 0.04 Unit(s)/Min (6 mL/Hr) IV Continuous <Continuous>    MEDICATIONS  (PRN):  acetaminophen  Suppository .. 650 milliGRAM(s) Rectal every 4 hours PRN Temp greater or equal to 38.5C (101.3F)  heparin   Injectable 9000 Unit(s) IV Push every 6 hours PRN For aPTT less than 40  heparin   Injectable 4000 Unit(s) IV Push every 6 hours PRN For aPTT between 40 - 57      PHYSICAL EXAM:  GENERAL: intubated  HEAD:  Atraumatic, Normocephalic  EYES: EOMI, PERRLA, conjunctiva and sclera clear  NECK: Supple, No JVD, Normal thyroid, no enlarged nodes  NERVOUS SYSTEM:  Alert & Awake. Following commands  CHEST/LUNG: B/L good air entry; No rales, rhonchi, or wheezing  HEART: S1S2 normal, no S3, Regular rate and rhythm; No murmurs  ABDOMEN: Soft, Nontender, Nondistended; Bowel sounds present  EXTREMITIES:  2+ Peripheral Pulses, No clubbing, cyanosis, or edema  LYMPH: No lymphadenopathy noted  SKIN: No rashes or lesions    Care Discussed with Consultants/Other Providers [ x] YES  [ ] NO

## 2023-02-03 NOTE — PROGRESS NOTE ADULT - SUBJECTIVE AND OBJECTIVE BOX
Over Night Events: events noted, still intubated, ventilated, was on precedex, fentanyl, levo 0.3, vaso 0.04, bicarb drip, IJ Placed    PHYSICAL EXAM    ICU Vital Signs Last 24 Hrs  T(C): 37.2 (03 Feb 2023 08:00), Max: 37.4 (03 Feb 2023 04:00)  T(F): 99 (03 Feb 2023 08:00), Max: 99.4 (03 Feb 2023 04:00)  HR: 64 (03 Feb 2023 08:00) (62 - 86)  ABP: 122/46 (03 Feb 2023 08:00) (114/48 - 142/62)  ABP(mean): 68 (03 Feb 2023 08:00) (68 - 86)  RR: 4 (03 Feb 2023 08:00) (0 - 32)  SpO2: 99% (03 Feb 2023 08:00) (98% - 100%)    O2 Parameters below as of 03 Feb 2023 08:00  Patient On (Oxygen Delivery Method): ventilator    O2 Concentration (%): 80        General: ill looking  HEENT: ETT      Lungs: DEC BS both bases  Cardiovascular: EARLENE 2.6  Abdomen: Soft, Positive BS  Extremities: No clubbing   Follows commands      02-02-23 @ 07:01  -  02-03-23 @ 07:00  --------------------------------------------------------  IN:    Dexmedetomidine: 269.8 mL    dextrose 5% w/ Additives: 1800 mL    Enteral Tube Flush: 120 mL    FentaNYL: 236.5 mL    Heparin Infusion: 120 mL    Heparin Infusion: 376 mL    IV PiggyBack: 750 mL    Norepinephrine: 2174.1 mL    Peptamen A.F.: 585 mL    Vasopressin: 144 mL  Total IN: 6575.4 mL    OUT:    Indwelling Catheter - Urethral (mL): 2095 mL  Total OUT: 2095 mL    Total NET: 4480.4 mL      02-03-23 @ 07:01  -  02-03-23 @ 09:08  --------------------------------------------------------  IN:    dextrose 5% w/ Additives: 75 mL    FentaNYL: 11 mL    Heparin Infusion: 26 mL    Norepinephrine: 30.9 mL    Peptamen A.F.: 60 mL    Vasopressin: 6 mL  Total IN: 208.9 mL    OUT:    Indwelling Catheter - Urethral (mL): 125 mL  Total OUT: 125 mL    Total NET: 83.9 mL          LABS:                          10.9   29.10 )-----------( 270      ( 03 Feb 2023 05:44 )             30.4                                               02-03    128<L>  |  86<L>  |  72<HH>  ----------------------------<  218<H>  3.3<L>   |  22  |  4.2<HH>    Ca    9.7      03 Feb 2023 05:44  Phos  6.6     02-03  Mg     1.6     02-03    TPro  4.8<L>  /  Alb  2.7<L>  /  TBili  0.5  /  DBili  x   /  AST  16  /  ALT  22  /  AlkPhos  170<H>  02-03      PTT - ( 03 Feb 2023 05:44 )  PTT:>200.0 sec                                           CARDIAC MARKERS ( 01 Feb 2023 10:42 )  x     / 0.08 ng/mL / 107 U/L / x     / x                                                LIVER FUNCTIONS - ( 03 Feb 2023 05:44 )  Alb: 2.7 g/dL / Pro: 4.8 g/dL / ALK PHOS: 170 U/L / ALT: 22 U/L / AST: 16 U/L / GGT: x                                                  Culture - Urine (collected 31 Jan 2023 20:15)  Source: Clean Catch Clean Catch (Midstream)  Final Report (03 Feb 2023 07:37):    >100,000 CFU/ml Proteus mirabilis ESBL  Organism: Proteus mirabilis ESBL (03 Feb 2023 07:37)  Organism: Proteus mirabilis ESBL (03 Feb 2023 07:37)    Culture - Blood (collected 31 Jan 2023 19:03)  Source: .Blood Blood-Peripheral  Preliminary Report (02 Feb 2023 01:02):    No growth to date.    Culture - Blood (collected 31 Jan 2023 19:03)  Source: .Blood Blood-Peripheral  Gram Stain (02 Feb 2023 08:28):    Growth in aerobic bottle: Gram Positive Cocci in Clusters  Final Report (02 Feb 2023 21:27):    Growth in aerobic bottle: Staphylococcus epidermidis    Coag Negative Staphylococcus    Single set isolate, possible contaminant. Contact    Microbiology if susceptibility testing clinically    indicated.    ***Blood Panel PCR results on this specimen are available    approximately 3 hours after the Gram stain result.***    Gram stain, PCR, and/or culture results may not always    correspond due to difference in methodologies.    ************************************************************    This PCR assay was performed by multiplex PCR. This    Assay tests for 66 bacterial and resistance gene targets.    Please refer to the [facility] test directory    at [email or web address] for details.  Organism: Blood Culture PCR (02 Feb 2023 21:27)  Organism: Blood Culture PCR (02 Feb 2023 21:27)                                                   Mode: AC/ CMV (Assist Control/ Continuous Mandatory Ventilation)  RR (machine): 32  TV (machine): 370  FiO2: 80  PEEP: 16  ITime: 1  MAP: 22  PIP: 32                                      ABG - ( 03 Feb 2023 04:10 )  pH, Arterial: 7.36  pH, Blood: x     /  pCO2: 42    /  pO2: 108   / HCO3: 24    / Base Excess: -1.7  /  SaO2: 96.7        plateau 27        MEDICATIONS  (STANDING):  cefTRIAXone   IVPB 2000 milliGRAM(s) IV Intermittent every 24 hours  chlorhexidine 0.12% Liquid 15 milliLiter(s) Oral Mucosa every 12 hours  chlorhexidine 2% Cloths 1 Application(s) Topical daily  dexMEDEtomidine Infusion 0.2 MICROgram(s)/kG/Hr (5.5 mL/Hr) IV Continuous <Continuous>  dextrose 5% 1000 milliLiter(s) (75 mL/Hr) IV Continuous <Continuous>  fentaNYL   Infusion. 0.5 MICROgram(s)/kG/Hr (5.5 mL/Hr) IV Continuous <Continuous>  glucagon  Injectable 1 milliGRAM(s) IntraMuscular once  heparin  Infusion. 2400 Unit(s)/Hr (24 mL/Hr) IV Continuous <Continuous>  hydrocortisone sodium succinate Injectable 50 milliGRAM(s) IV Push every 6 hours  insulin lispro (ADMELOG) corrective regimen sliding scale   SubCutaneous every 6 hours  levoFLOXacin IVPB 500 milliGRAM(s) IV Intermittent every 48 hours  norepinephrine Infusion 0.05 MICROgram(s)/kG/Min (5.16 mL/Hr) IV Continuous <Continuous>  pantoprazole  Injectable 40 milliGRAM(s) IV Push daily  polyethylene glycol 3350 17 Gram(s) Oral daily  senna 2 Tablet(s) Oral at bedtime  sodium bicarbonate  Injectable 100 milliEquivalent(s) IV Push once  vasopressin Infusion 0.04 Unit(s)/Min (6 mL/Hr) IV Continuous <Continuous>    MEDICATIONS  (PRN):  acetaminophen  Suppository .. 650 milliGRAM(s) Rectal every 4 hours PRN Temp greater or equal to 38.5C (101.3F)  heparin   Injectable 9000 Unit(s) IV Push every 6 hours PRN For aPTT less than 40  heparin   Injectable 4000 Unit(s) IV Push every 6 hours PRN For aPTT between 40 - 57      CXR reviewed

## 2023-02-03 NOTE — PROGRESS NOTE ADULT - SUBJECTIVE AND OBJECTIVE BOX
Nephrology progress note    THIS IS AN INCOMPLETE NOTE . FULL NOTE TO FOLLOW SHORTLY    Patient is seen and examined, events over the last 24 h noted .    Allergies:  No Known Allergies    Hospital Medications:   MEDICATIONS  (STANDING):  cefTRIAXone   IVPB 2000 milliGRAM(s) IV Intermittent every 24 hours  chlorhexidine 0.12% Liquid 15 milliLiter(s) Oral Mucosa every 12 hours  chlorhexidine 2% Cloths 1 Application(s) Topical daily  dexMEDEtomidine Infusion 0.2 MICROgram(s)/kG/Hr (5.5 mL/Hr) IV Continuous <Continuous>  dextrose 5% 1000 milliLiter(s) (75 mL/Hr) IV Continuous <Continuous>  fentaNYL   Infusion. 0.5 MICROgram(s)/kG/Hr (5.5 mL/Hr) IV Continuous <Continuous>  glucagon  Injectable 1 milliGRAM(s) IntraMuscular once  heparin  Infusion. 2400 Unit(s)/Hr (24 mL/Hr) IV Continuous <Continuous>  hydrocortisone sodium succinate Injectable 50 milliGRAM(s) IV Push every 6 hours  insulin lispro (ADMELOG) corrective regimen sliding scale   SubCutaneous every 6 hours  levoFLOXacin IVPB 500 milliGRAM(s) IV Intermittent every 48 hours  norepinephrine Infusion 0.05 MICROgram(s)/kG/Min (5.16 mL/Hr) IV Continuous <Continuous>  pantoprazole  Injectable 40 milliGRAM(s) IV Push daily  polyethylene glycol 3350 17 Gram(s) Oral daily  senna 2 Tablet(s) Oral at bedtime  sodium bicarbonate  Injectable 100 milliEquivalent(s) IV Push once  vasopressin Infusion 0.04 Unit(s)/Min (6 mL/Hr) IV Continuous <Continuous>        VITALS:  T(F): 99 (23 @ 08:00), Max: 99.4 (23 @ 04:00)  HR: 64 (23 @ 08:00)  BP: --  RR: 4 (23 @ 08:00)  SpO2: 99% (23 @ 08:00)  Wt(kg): --     @ 07:01  -   @ 07:00  --------------------------------------------------------  IN: 6515 mL / OUT: 625 mL / NET: 5890 mL     @ 07: @ 07:00  --------------------------------------------------------  IN: 6575.4 mL / OUT: 2095 mL / NET: 4480.4 mL     @ 07: @ 08:52  --------------------------------------------------------  IN: 208.9 mL / OUT: 125 mL / NET: 83.9 mL          PHYSICAL EXAM:  Constitutional: NAD  HEENT: anicteric sclera, oropharynx clear, MMM  Neck: No JVD  Respiratory: CTAB, no wheezes, rales or rhonchi  Cardiovascular: S1, S2, RRR  Gastrointestinal: BS+, soft, NT/ND  Extremities: No cyanosis or clubbing. No peripheral edema  :  No ya.   Skin: No rashes    LABS:      128<L>  |  86<L>  |  72<HH>  ----------------------------<  218<H>  3.3<L>   |  22  |  4.2<HH>    Creatinine Trend: 4.2<--, 4.8<--, 3.4<--, 4.9<--, 5.3<--, 5.4<--    Ca    9.7      2023 05:44  Phos  6.6       Mg     1.6         TPro  4.8<L>  /  Alb  2.7<L>  /  TBili  0.5  /  DBili      /  AST  16  /  ALT  22  /  AlkPhos  170<H>                            10.9   29.10 )-----------( 270      ( 2023 05:44 )             30.4       Urine Studies:  Urinalysis Basic - ( 2023 20:15 )    Color: Orange / Appearance: Turbid / S.012 / pH:   Gluc:  / Ketone: Negative  / Bili: Negative / Urobili: <2 mg/dL   Blood:  / Protein: 300 mg/dL / Nitrite: Negative   Leuk Esterase: Large / RBC: 6 /HPF / WBC >720 /HPF   Sq Epi:  / Non Sq Epi: >27 /HPF / Bacteria: Many          Ferritin 347      [23 @ 10:42]  HbA1c 5.6      [19 @ 07:09]    HCV 0.08, Nonreact      [19 @ 07:37]        RADIOLOGY & ADDITIONAL STUDIES:   Nephrology progress note  Patient is seen and examined, events over the last 24 h noted .  Lying in bed awake on MV  non oliguric   Allergies:  No Known Allergies    Hospital Medications:   MEDICATIONS  (STANDING):    cefTRIAXone   IVPB 2000 milliGRAM(s) IV Intermittent every 24 hours  dexMEDEtomidine Infusion 0.2 MICROgram(s)/kG/Hr (5.5 mL/Hr) IV Continuous <Continuous>  dextrose 5% 1000 milliLiter(s) (75 mL/Hr) IV Continuous <Continuous>  fentaNYL   Infusion. 0.5 MICROgram(s)/kG/Hr (5.5 mL/Hr) IV Continuous <Continuous>  glucagon  Injectable 1 milliGRAM(s) IntraMuscular once  heparin  Infusion. 2400 Unit(s)/Hr (24 mL/Hr) IV Continuous <Continuous>  hydrocortisone sodium succinate Injectable 50 milliGRAM(s) IV Push every 6 hours  insulin lispro (ADMELOG) corrective regimen sliding scale   SubCutaneous every 6 hours  levoFLOXacin IVPB 500 milliGRAM(s) IV Intermittent every 48 hours  norepinephrine Infusion 0.05 MICROgram(s)/kG/Min (5.16 mL/Hr) IV Continuous <Continuous>  pantoprazole  Injectable 40 milliGRAM(s) IV Push daily  polyethylene glycol 3350 17 Gram(s) Oral daily  senna 2 Tablet(s) Oral at bedtime  sodium bicarbonate  Injectable 100 milliEquivalent(s) IV Push once  vasopressin Infusion 0.04 Unit(s)/Min (6 mL/Hr) IV Continuous <Continuous>        VITALS:  T(F): 99 (23 @ 08:00), Max: 99.4 (23 @ 04:00)  HR: 64 (23 @ 08:00)  BP: 110/65  RR: 4 (23 @ 08:00)  SpO2: 99% (23 @ 08:00)  Wt(kg): --     @ :  -   @ 07:00  --------------------------------------------------------  IN: 6515 mL / OUT: 625 mL / NET: 5890 mL     @ 07:01  -   @ 07:00  --------------------------------------------------------  IN: 6575.4 mL / OUT: 2095 mL / NET: 4480.4 mL     @ 07:01  -   @ 08:52  --------------------------------------------------------  IN: 208.9 mL / OUT: 125 mL / NET: 83.9 mL          PHYSICAL EXAM:  Constitutional: on MV  Neck: No JVD  Respiratory: Crackles at base   Cardiovascular: S1, S2, RRR  Gastrointestinal: BS+, soft, NT/ND  Extremities: No cyanosis or clubbing. trace edema   :   ya.   Skin: No rashes    LABS:      128<L>  |  86<L>  |  72<HH>  ----------------------------<  218<H>  3.3<L>   |  22  |  4.2<HH>    Creatinine Trend: 4.2<--, 4.8<--, 3.4<--, 4.9<--, 5.3<--, 5.4<--  SODIUM TREND:  Sodium 128 [ @ 05:44]  Sodium 128 [ @ 23:59]  Sodium 131 [ @ 17:15]  Sodium 128 [ @ 08:12]  Sodium 126 [ @ 10:10]  Sodium 125 [ @ 05:55]  Sodium 122 [ @ 19:04]    Ca    9.7      2023 05:44  Phos  6.6       Mg     1.6         TPro  4.8<L>  /  Alb  2.7<L>  /  TBili  0.5  /  DBili      /  AST  16  /  ALT  22  /  AlkPhos  170<H>                            10.9   29.10 )-----------( 270      ( 2023 05:44 )             30.4       Urine Studies:  Urinalysis Basic - ( 2023 20:15 )    Color: Orange / Appearance: Turbid / S.012 / pH:   Gluc:  / Ketone: Negative  / Bili: Negative / Urobili: <2 mg/dL   Blood:  / Protein: 300 mg/dL / Nitrite: Negative   Leuk Esterase: Large / RBC: 6 /HPF / WBC >720 /HPF   Sq Epi:  / Non Sq Epi: >27 /HPF / Bacteria: Many          Ferritin 347      [23 @ 10:42]  HbA1c 5.6      [19 @ 07:09]    HCV 0.08, Nonreact      [19 @ 07:37]        RADIOLOGY & ADDITIONAL STUDIES:

## 2023-02-03 NOTE — PROGRESS NOTE ADULT - SUBJECTIVE AND OBJECTIVE BOX
PATRICIA SPRAGUE  72y, Female  Allergy: No Known Allergies      LOS  3d    CHIEF COMPLAINT: respiratory distress (03 Feb 2023 09:07)      INTERVAL EVENTS/HPI  - No acute events overnight  - T(F): , Max: 99.4 (02-03-23 @ 04:00)  - remains on levophed/vaso  - FIO2 80%  - awake  - broadened to meropenem given ESBL Proteus in urine   - WBC Count: 29.10 (02-03-23 @ 05:44)  WBC Count: 30.31 (02-02-23 @ 21:40)     - Creatinine, Serum: 4.2 (02-03-23 @ 05:44)  Creatinine, Serum: 4.8 (02-02-23 @ 23:59)       ROS  unable to obtain history secondary to patient's mental status and/or sedation    VITALS:  T(F): 99, Max: 99.4 (02-03-23 @ 04:00)  HR: 64  BP: --  RR: 4Vital Signs Last 24 Hrs  T(C): 37.2 (03 Feb 2023 08:00), Max: 37.4 (03 Feb 2023 04:00)  T(F): 99 (03 Feb 2023 08:00), Max: 99.4 (03 Feb 2023 04:00)  HR: 64 (03 Feb 2023 08:00) (62 - 86)  BP: --  BP(mean): --  RR: 4 (03 Feb 2023 08:00) (0 - 32)  SpO2: 99% (03 Feb 2023 08:00) (98% - 100%)    Parameters below as of 03 Feb 2023 08:00  Patient On (Oxygen Delivery Method): ventilator    O2 Concentration (%): 80    PHYSICAL EXAM:  Gen: NAD, resting in bed  HEENT: Normocephalic, atraumatic  Neck: supple, no lymphadenopathy  CV: Regular rate & regular rhythm  Lungs: decreased BS at bases, no fremitus  Abdomen: Soft, BS present  Ext: Warm, well perfused  Neuro: non focal, awake  Skin: no rash, no erythema  Lines: no phlebitis    FH: Non-contributory  Social Hx: Non-contributory    TESTS & MEASUREMENTS:                        10.9   29.10 )-----------( 270      ( 03 Feb 2023 05:44 )             30.4     02-03    128<L>  |  86<L>  |  72<HH>  ----------------------------<  218<H>  3.3<L>   |  22  |  4.2<HH>    Ca    9.7      03 Feb 2023 05:44  Phos  6.6     02-03  Mg     1.6     02-03    TPro  4.8<L>  /  Alb  2.7<L>  /  TBili  0.5  /  DBili  x   /  AST  16  /  ALT  22  /  AlkPhos  170<H>  02-03      LIVER FUNCTIONS - ( 03 Feb 2023 05:44 )  Alb: 2.7 g/dL / Pro: 4.8 g/dL / ALK PHOS: 170 U/L / ALT: 22 U/L / AST: 16 U/L / GGT: x               Culture - Urine (collected 01-31-23 @ 20:15)  Source: Clean Catch Clean Catch (Midstream)  Final Report (02-03-23 @ 07:37):    >100,000 CFU/ml Proteus mirabilis ESBL  Organism: Proteus mirabilis ESBL (02-03-23 @ 07:37)  Organism: Proteus mirabilis ESBL (02-03-23 @ 07:37)      -  Amikacin: S <=16      -  Amoxicillin/Clavulanic Acid: S <=8/4      -  Ampicillin: R >16 These ampicillin results predict results for amoxicillin      -  Ampicillin/Sulbactam: S <=4/2 Enterobacter, Klebsiella aerogenes, Citrobacter, and Serratia may develop resistance during prolonged therapy (3-4 days)      -  Aztreonam: R <=4      -  Cefazolin: R >16 For uncomplicated UTI with K. pneumoniae, E. coli, or P. mirablis: NAGI <=16 is sensitive and NAGI >=32 is resistant. This also predicts results for oral agents cefaclor, cefdinir, cefpodoxime, cefprozil, cefuroxime axetil, cephalexin and locarbef for uncomplicated UTI. Note that some isolates may be susceptible to these agents while testing resistant to cefazolin.      -  Cefepime: R >16      -  Ceftriaxone: R >32 Enterobacter, Klebsiella aerogenes, Citrobacter, and Serratia may develop resistance during prolonged therapy      -  Cefuroxime: R >16      -  Ciprofloxacin: R >2      -  Ertapenem: S <=0.5      -  Gentamicin: S <=2      -  Levofloxacin: R >4      -  Meropenem: S <=1      -  Nitrofurantoin: R >64 Should not be used to treat pyelonephritis      -  Piperacillin/Tazobactam: S <=8      -  Tobramycin: S <=2      -  Trimethoprim/Sulfamethoxazole: R >2/38      Method Type: NAGI    Culture - Blood (collected 01-31-23 @ 19:03)  Source: .Blood Blood-Peripheral  Preliminary Report (02-02-23 @ 01:02):    No growth to date.    Culture - Blood (collected 01-31-23 @ 19:03)  Source: .Blood Blood-Peripheral  Gram Stain (02-02-23 @ 08:28):    Growth in aerobic bottle: Gram Positive Cocci in Clusters  Final Report (02-02-23 @ 21:27):    Growth in aerobic bottle: Staphylococcus epidermidis    Coag Negative Staphylococcus    Single set isolate, possible contaminant. Contact    Microbiology if susceptibility testing clinically    indicated.    ***Blood Panel PCR results on this specimen are available    approximately 3 hours after the Gram stain result.***    Gram stain, PCR, and/or culture results may not always    correspond due to difference in methodologies.    ************************************************************    This PCR assay was performed by multiplex PCR. This    Assay tests for 66 bacterial and resistance gene targets.    Please refer to the [facility] test directory    at [email or web address] for details.  Organism: Blood Culture PCR (02-02-23 @ 21:27)  Organism: Blood Culture PCR (02-02-23 @ 21:27)      -  Staphylococcus epidermidis, Methicillin resistant: Detec      Method Type: PCR        Lactate, Blood: 1.5 mmol/L (02-01-23 @ 10:42)  Lactate, Blood: 1.0 mmol/L (01-31-23 @ 19:04)  Blood Gas Venous - Lactate: 1.10 mmol/L (01-31-23 @ 18:21)      INFECTIOUS DISEASES TESTING  Procalcitonin, Serum: 49.60 (02-02-23 @ 12:05)  MRSA PCR Result.: Negative (02-01-23 @ 10:42)  Procalcitonin, Serum: 37.20 (02-01-23 @ 10:42)  Legionella Antigen, Urine: Negative (02-01-23 @ 10:10)  Procalcitonin, Serum: 14.30 (02-01-23 @ 05:55)      INFLAMMATORY MARKERS  C-Reactive Protein, Serum: 107.1 mg/L (02-01-23 @ 10:42)      RADIOLOGY & ADDITIONAL TESTS:  I have personally reviewed the last available Chest xray  CXR  Xray Chest 1 View- PORTABLE-Urgent:   ACC: [id number] EXAM:  XR CHEST PORTABLE URGENT 1V   ORDERED BY: TIMBO PRICE     PROCEDURE DATE:  02/02/2023          INTERPRETATION:  Clinical History / Reason for exam: Respiratory failure    Comparison : Chest radiograph earlier the same day.    Technique/Positioning: Frontal portable.    Findings:    Support devices: Endotracheal tube is above the tor. Left-sided   central catheter. Enteric catheter is seen. Telemetry leads are   identified.    Cardiac/mediastinum/hilum: Unremarkable.    Lung parenchyma/Pleura: Bilateral opacities    Skeleton/soft tissues: Unremarkable.    Impression:    Bilateral opacifications. Support devices as described. Follow-up as   needed.        --- End of Report ---            MARISEL VACA MD; Attending Interventional Radiologist  This document has been electronically signed. Feb 2 2023  2:07PM (02-02-23 @ 14:05)      CT  CT Abdomen and Pelvis No Cont:   ACC: [id number] EXAM:  CT ABDOMEN AND PELVIS   ORDERED BY: RAHUL HUTTON     ACC: [id number] EXAM:  CT CHEST   ORDERED BY: RAHUL HUTTON     PROCEDURE DATE:  01/31/2023          INTERPRETATION:  CLINICAL HISTORY/REASON FOR EXAM: Shortness of breath.    TECHNIQUE: Contiguous axial CT images were obtained from the thoracic   inlet to the pubic symphysis without intravenous contrast. Oral contrast   was not administered. Reformatted images in the coronal and sagittal   planes were acquired. 3D (MIP) images obtained. There is breathing motion   artifact. There is photon starvation artifact. Truncation artifact which   limits evaluation of peripheral soft tissues.    COMPARISON: CT abdomen and pelvis 10/1/2021.      FINDINGS:    CHEST:    LUNGS, PLEURA, AIRWAYS: Mild right pleural effusion with adjacent   atelectasis. Bibasilar atelectatic changes versus scarring. Partial   obstruction of the distal right lower lobe bronchioles. Otherwise no   evidence of central endobronchial obstruction. No focal consolidation.   Right upper lobe pulmonary cyst (4-130). No pleural effusion. No   pneumothorax.    THORACIC NODES: Left periaortic lymph node measuring up to 1.1 cm   (4-127). No supraclavicular or axillary lymphadenopathy.    MEDIASTINUM/GREAT VESSELS: Cardiomegaly. No pericardial effusion. Dilated   main pulmonary artery measures up to 3.6 cm. The aorta is of normal   caliber. There is apparent thickening of the esophageal wall. If   indicated, endoscopy may be considered.    ABDOMEN/PELVIS:    HEPATOBILIARY: Cholecystectomy. Unremarkable CT appearance of the liver.    SPLEEN: Unremarkable.    PANCREAS: Unremarkable.    ADRENAL GLANDS: Redemonstrated 2.4 cm left adrenal gland nodule.    KIDNEYS: Redemonstrated hypertrophic right kidney, congenitally absent   left kidney.    ABDOMINOPELVIC NODES: Unremarkable.    PELVIC ORGANS: Redemonstrated calcified uterine fibroid.    PERITONEUM/MESENTERY/BOWEL: Redemonstrated anterior abdominal wall   diastasis, which contains bowel and part of liver. No bowel obstruction.   No pneumoperitoneum.    BONES/SOFT TISSUES: Degenerative changes of the spine. Peripheral soft   tissues are excluded from field-of-view.    OTHER: Atherosclerotic vascular calcifications. Right femoral venous   catheter.      IMPRESSION:    On this breathing motion, quantum mottle, and truncation artifact limited   examination:    Mild right pleural effusion.    Dilated main pulmonary artery, which can be seen in pulmonary   hypertension.    Chronic findings as above.    --- End of Report ---          YARELI WRIGHT MD; Resident Radiologist  This document has been electronically signed.  REINA CARTER MD; Attending Interventional Radiologist  This document has been electronically signed. Jan 31 2023 10:44PM (01-31-23 @ 21:17)      CARDIOLOGY TESTING  12 Lead ECG:   Ventricular Rate 59 BPM    QRS Duration 118 ms    Q-T Interval 448 ms    QTC Calculation(Bazett) 443 ms    R Axis 86 degrees    T Axis 19 degrees    Diagnosis Line Atrial fibrillation with slow ventricular response  Low voltage QRS    Abnormal ECG    Confirmed by MAI RHOADES MD ([id number]) on 2/1/2023 7:24:08 AM (01-31-23 @ 20:19)      MEDICATIONS  chlorhexidine 0.12% Liquid 15 Oral Mucosa every 12 hours  chlorhexidine 2% Cloths 1 Topical daily  dexMEDEtomidine Infusion 0.2 IV Continuous <Continuous>  dextrose 5% 1000 IV Continuous <Continuous>  fentaNYL   Infusion. 0.5 IV Continuous <Continuous>  glucagon  Injectable 1 IntraMuscular once  heparin  Infusion. 2400 IV Continuous <Continuous>  hydrocortisone sodium succinate Injectable 50 IV Push every 6 hours  insulin lispro (ADMELOG) corrective regimen sliding scale  SubCutaneous every 6 hours  levoFLOXacin IVPB 500 IV Intermittent every 48 hours  magnesium sulfate  IVPB 2 IV Intermittent once  meropenem  IVPB 500 IV Intermittent every 12 hours  norepinephrine Infusion 0.05 IV Continuous <Continuous>  pantoprazole  Injectable 40 IV Push daily  polyethylene glycol 3350 17 Oral daily  senna 2 Oral at bedtime  sodium bicarbonate  Injectable 100 IV Push once  vasopressin Infusion 0.04 IV Continuous <Continuous>      WEIGHT  Weight (kg): 110 (01-31-23 @ 19:58)  Creatinine, Serum: 4.2 mg/dL (02-03-23 @ 05:44)  Creatinine, Serum: 4.8 mg/dL (02-02-23 @ 23:59)  Creatinine, Serum: 3.4 mg/dL (02-02-23 @ 17:15)      ANTIBIOTICS:  levoFLOXacin IVPB 500 milliGRAM(s) IV Intermittent every 48 hours  meropenem  IVPB 500 milliGRAM(s) IV Intermittent every 12 hours      All available historical records have been reviewed

## 2023-02-03 NOTE — CHART NOTE - NSCHARTNOTEFT_GEN_A_CORE
NUTRITION SUPPORT TEAM  -  PROGRESS NOTE     Interval Events:        REVIEW OF SYSTEMS:  Negative except as noted above.     VITALS:  T(F): 99 (02-03 @ 08:00), Max: 99.4 (02-03 @ 04:00)  HR: 68 (02-03 @ 09:46) (62 - 78)  BP: --  RR: 4 (02-03 @ 08:00) (0 - 32)  SpO2: 99% (02-03 @ 09:46) (98% - 100%)      HEIGHT/WEIGHT/BMI:   Height (cm): 165.1 (02-01)  Weight (kg): 110 (01-31)  BMI (kg/m2): 40.4 (02-01)      I/Os:     02-02-23 @ 07:01  -  02-03-23 @ 07:00  --------------------------------------------------------  IN:    Dexmedetomidine: 269.8 mL    dextrose 5% w/ Additives: 1800 mL    Enteral Tube Flush: 120 mL    FentaNYL: 236.5 mL    Heparin Infusion: 120 mL    Heparin Infusion: 376 mL    IV PiggyBack: 750 mL    Norepinephrine: 2174.1 mL    Peptamen A.F.: 585 mL    Vasopressin: 144 mL  Total IN: 6575.4 mL    OUT:    Indwelling Catheter - Urethral (mL): 2095 mL  Total OUT: 2095 mL    Total NET: 4480.4 mL      MEDICATIONS:   acetaminophen  Suppository .. 650 milliGRAM(s) Rectal every 4 hours PRN  chlorhexidine 0.12% Liquid 15 milliLiter(s) Oral Mucosa every 12 hours  chlorhexidine 2% Cloths 1 Application(s) Topical daily  dexMEDEtomidine Infusion 0.2 MICROgram(s)/kG/Hr IV Continuous <Continuous>  dextrose 5% 1000 milliLiter(s) IV Continuous <Continuous>  fentaNYL   Infusion. 0.5 MICROgram(s)/kG/Hr IV Continuous <Continuous>  glucagon  Injectable 1 milliGRAM(s) IntraMuscular once  heparin   Injectable 9000 Unit(s) IV Push every 6 hours PRN  heparin   Injectable 4000 Unit(s) IV Push every 6 hours PRN  heparin  Infusion. 2400 Unit(s)/Hr IV Continuous <Continuous>  hydrocortisone sodium succinate Injectable 50 milliGRAM(s) IV Push every 6 hours  insulin lispro (ADMELOG) corrective regimen sliding scale   SubCutaneous every 6 hours  levoFLOXacin IVPB 500 milliGRAM(s) IV Intermittent every 48 hours  magnesium sulfate  IVPB 2 Gram(s) IV Intermittent once  meropenem  IVPB 500 milliGRAM(s) IV Intermittent every 12 hours  norepinephrine Infusion 0.05 MICROgram(s)/kG/Min IV Continuous <Continuous>  pantoprazole  Injectable 40 milliGRAM(s) IV Push daily  polyethylene glycol 3350 17 Gram(s) Oral daily  senna 2 Tablet(s) Oral at bedtime  sodium bicarbonate  Injectable 100 milliEquivalent(s) IV Push once  vasopressin Infusion 0.04 Unit(s)/Min IV Continuous <Continuous>      LABS:                         10.9   29.10 )-----------( 270      ( 03 Feb 2023 05:44 )             30.4     128<L>  |  86<L>  |  72<HH>  ----------------------------<  218<H>          (02-03-23 @ 05:44)  3.3<L>   |  22  |  4.2<HH>    Ca    9.7          (02-03-23 @ 05:44)  Phos  6.6         (02-03-23 @ 05:44)  Mg     1.6         (02-03-23 @ 05:44)    TPro  4.8<L>  /  Alb  2.7<L>  /  TBili  0.5  /  DBili  x   /  AST  16  /  ALT  22  /  AlkPhos  170<H>       02-03-23 @ 05:44      A1C with Estimated Average Glucose (02.02.23 @ 04:30)    A1C with Estimated Average Glucose Result: 5.5     Estimated Average Glucose: 111        Blood Glucose (Past 24 hours):  227 mg/dL (02-03 @ 05:07)  212 mg/dL (02-02 @ 23:05)  173 mg/dL (02-02 @ 17:34)  267 mg/dL (02-02 @ 12:45)      DIET:   Diet, NPO with Tube Feed:   Tube Feeding Modality: Nasogastric  Peptamen A.F. Formula  Total Volume for 24 Hours (mL): 1440  Continuous  Starting Tube Feed Rate {mL per Hour}: 25  Increase Tube Feed Rate by (mL): 10     Every 4 hours  Until Goal Tube Feed Rate (mL per Hour): 60  Tube Feed Duration (in Hours): 24  Tube Feed Start Time: 10:00 (02-02-23 @ 10:04) [Active]      ASSESSMENT  - covid +  - acute hypoxic resp failure/ ARDS  - morbid obesity  - DM with hyperglycemia  - NOLA  - shock on pressors      SUGGEST:  - cont Peptamen AF, goal feeds 45ml/hr and add Beneprotein 6 packs/day  - glycemic control   - monitor renal fx, lytes  - monitor BM's (on miralax and senna)  - will follow

## 2023-02-04 LAB
ALBUMIN SERPL ELPH-MCNC: 2.5 G/DL — LOW (ref 3.5–5.2)
ALP SERPL-CCNC: 126 U/L — HIGH (ref 30–115)
ALT FLD-CCNC: 14 U/L — SIGNIFICANT CHANGE UP (ref 0–41)
ANION GAP SERPL CALC-SCNC: 12 MMOL/L — SIGNIFICANT CHANGE UP (ref 7–14)
ANION GAP SERPL CALC-SCNC: 15 MMOL/L — HIGH (ref 7–14)
APTT BLD: 108.9 SEC — CRITICAL HIGH (ref 27–39.2)
APTT BLD: 73.1 SEC — CRITICAL HIGH (ref 27–39.2)
APTT BLD: 97 SEC — CRITICAL HIGH (ref 27–39.2)
AST SERPL-CCNC: 6 U/L — SIGNIFICANT CHANGE UP (ref 0–41)
BASE EXCESS BLDA CALC-SCNC: -0.2 MMOL/L — SIGNIFICANT CHANGE UP (ref -2–3)
BASOPHILS # BLD AUTO: 0.02 K/UL — SIGNIFICANT CHANGE UP (ref 0–0.2)
BASOPHILS NFR BLD AUTO: 0.1 % — SIGNIFICANT CHANGE UP (ref 0–1)
BILIRUB SERPL-MCNC: 0.5 MG/DL — SIGNIFICANT CHANGE UP (ref 0.2–1.2)
BUN SERPL-MCNC: 71 MG/DL — CRITICAL HIGH (ref 10–20)
BUN SERPL-MCNC: 72 MG/DL — CRITICAL HIGH (ref 10–20)
CALCIUM SERPL-MCNC: 10 MG/DL — SIGNIFICANT CHANGE UP (ref 8.4–10.5)
CALCIUM SERPL-MCNC: 10 MG/DL — SIGNIFICANT CHANGE UP (ref 8.4–10.5)
CHLORIDE SERPL-SCNC: 88 MMOL/L — LOW (ref 98–110)
CHLORIDE SERPL-SCNC: 92 MMOL/L — LOW (ref 98–110)
CO2 SERPL-SCNC: 26 MMOL/L — SIGNIFICANT CHANGE UP (ref 17–32)
CO2 SERPL-SCNC: 26 MMOL/L — SIGNIFICANT CHANGE UP (ref 17–32)
CREAT SERPL-MCNC: 3.3 MG/DL — HIGH (ref 0.7–1.5)
CREAT SERPL-MCNC: 3.6 MG/DL — HIGH (ref 0.7–1.5)
EGFR: 13 ML/MIN/1.73M2 — LOW
EGFR: 14 ML/MIN/1.73M2 — LOW
EOSINOPHIL # BLD AUTO: 0 K/UL — SIGNIFICANT CHANGE UP (ref 0–0.7)
EOSINOPHIL NFR BLD AUTO: 0 % — SIGNIFICANT CHANGE UP (ref 0–8)
GAS PNL BLDA: SIGNIFICANT CHANGE UP
GAS PNL BLDA: SIGNIFICANT CHANGE UP
GLUCOSE BLDC GLUCOMTR-MCNC: 132 MG/DL — HIGH (ref 70–99)
GLUCOSE BLDC GLUCOMTR-MCNC: 164 MG/DL — HIGH (ref 70–99)
GLUCOSE BLDC GLUCOMTR-MCNC: 200 MG/DL — HIGH (ref 70–99)
GLUCOSE BLDC GLUCOMTR-MCNC: 255 MG/DL — HIGH (ref 70–99)
GLUCOSE SERPL-MCNC: 213 MG/DL — HIGH (ref 70–99)
GLUCOSE SERPL-MCNC: 254 MG/DL — HIGH (ref 70–99)
HCO3 BLDA-SCNC: 25 MMOL/L — SIGNIFICANT CHANGE UP (ref 21–28)
HCT VFR BLD CALC: 27.6 % — LOW (ref 37–47)
HGB BLD-MCNC: 9.7 G/DL — LOW (ref 12–16)
HOROWITZ INDEX BLDA+IHG-RTO: 70 — SIGNIFICANT CHANGE UP
IMM GRANULOCYTES NFR BLD AUTO: 1.7 % — HIGH (ref 0.1–0.3)
LYMPHOCYTES # BLD AUTO: 0.98 K/UL — LOW (ref 1.2–3.4)
LYMPHOCYTES # BLD AUTO: 4.4 % — LOW (ref 20.5–51.1)
MAGNESIUM SERPL-MCNC: 2.2 MG/DL — SIGNIFICANT CHANGE UP (ref 1.8–2.4)
MAGNESIUM SERPL-MCNC: 2.4 MG/DL — SIGNIFICANT CHANGE UP (ref 1.8–2.4)
MCHC RBC-ENTMCNC: 29 PG — SIGNIFICANT CHANGE UP (ref 27–31)
MCHC RBC-ENTMCNC: 35.1 G/DL — SIGNIFICANT CHANGE UP (ref 32–37)
MCV RBC AUTO: 82.4 FL — SIGNIFICANT CHANGE UP (ref 81–99)
MONOCYTES # BLD AUTO: 1.34 K/UL — HIGH (ref 0.1–0.6)
MONOCYTES NFR BLD AUTO: 6 % — SIGNIFICANT CHANGE UP (ref 1.7–9.3)
NEUTROPHILS # BLD AUTO: 19.69 K/UL — HIGH (ref 1.4–6.5)
NEUTROPHILS NFR BLD AUTO: 87.8 % — HIGH (ref 42.2–75.2)
NRBC # BLD: 0 /100 WBCS — SIGNIFICANT CHANGE UP (ref 0–0)
PCO2 BLDA: 44 MMHG — SIGNIFICANT CHANGE UP (ref 25–48)
PH BLDA: 7.37 — SIGNIFICANT CHANGE UP (ref 7.35–7.45)
PLATELET # BLD AUTO: 210 K/UL — SIGNIFICANT CHANGE UP (ref 130–400)
PO2 BLDA: 121 MMHG — HIGH (ref 83–108)
POTASSIUM SERPL-MCNC: 3.1 MMOL/L — LOW (ref 3.5–5)
POTASSIUM SERPL-MCNC: 3.1 MMOL/L — LOW (ref 3.5–5)
POTASSIUM SERPL-SCNC: 3.1 MMOL/L — LOW (ref 3.5–5)
POTASSIUM SERPL-SCNC: 3.1 MMOL/L — LOW (ref 3.5–5)
PROT SERPL-MCNC: 4.4 G/DL — LOW (ref 6–8)
RBC # BLD: 3.35 M/UL — LOW (ref 4.2–5.4)
RBC # FLD: 12.8 % — SIGNIFICANT CHANGE UP (ref 11.5–14.5)
SAO2 % BLDA: 96.7 % — SIGNIFICANT CHANGE UP (ref 94–98)
SODIUM SERPL-SCNC: 129 MMOL/L — LOW (ref 135–146)
SODIUM SERPL-SCNC: 130 MMOL/L — LOW (ref 135–146)
WBC # BLD: 22.42 K/UL — HIGH (ref 4.8–10.8)
WBC # FLD AUTO: 22.42 K/UL — HIGH (ref 4.8–10.8)

## 2023-02-04 PROCEDURE — 76770 US EXAM ABDO BACK WALL COMP: CPT | Mod: 26

## 2023-02-04 PROCEDURE — 71045 X-RAY EXAM CHEST 1 VIEW: CPT | Mod: 26

## 2023-02-04 PROCEDURE — 93010 ELECTROCARDIOGRAM REPORT: CPT

## 2023-02-04 PROCEDURE — 99291 CRITICAL CARE FIRST HOUR: CPT | Mod: GC

## 2023-02-04 RX ORDER — HYDROCORTISONE 20 MG
50 TABLET ORAL EVERY 12 HOURS
Refills: 0 | Status: DISCONTINUED | OUTPATIENT
Start: 2023-02-04 | End: 2023-02-04

## 2023-02-04 RX ORDER — POTASSIUM CHLORIDE 20 MEQ
20 PACKET (EA) ORAL
Refills: 0 | Status: COMPLETED | OUTPATIENT
Start: 2023-02-04 | End: 2023-02-04

## 2023-02-04 RX ORDER — DEXAMETHASONE 0.5 MG/5ML
6 ELIXIR ORAL DAILY
Refills: 0 | Status: COMPLETED | OUTPATIENT
Start: 2023-02-04 | End: 2023-02-10

## 2023-02-04 RX ORDER — MEROPENEM 1 G/30ML
500 INJECTION INTRAVENOUS EVERY 12 HOURS
Refills: 0 | Status: DISCONTINUED | OUTPATIENT
Start: 2023-02-04 | End: 2023-02-06

## 2023-02-04 RX ORDER — LACTULOSE 10 G/15ML
20 SOLUTION ORAL EVERY 4 HOURS
Refills: 0 | Status: DISCONTINUED | OUTPATIENT
Start: 2023-02-04 | End: 2023-02-04

## 2023-02-04 RX ADMIN — Medication 6: at 05:27

## 2023-02-04 RX ADMIN — Medication 50 MILLIEQUIVALENT(S): at 03:01

## 2023-02-04 RX ADMIN — CHLORHEXIDINE GLUCONATE 1 APPLICATION(S): 213 SOLUTION TOPICAL at 11:13

## 2023-02-04 RX ADMIN — Medication 50 MILLIEQUIVALENT(S): at 08:01

## 2023-02-04 RX ADMIN — PANTOPRAZOLE SODIUM 40 MILLIGRAM(S): 20 TABLET, DELAYED RELEASE ORAL at 11:12

## 2023-02-04 RX ADMIN — HEPARIN SODIUM 1800 UNIT(S)/HR: 5000 INJECTION INTRAVENOUS; SUBCUTANEOUS at 14:34

## 2023-02-04 RX ADMIN — Medication 6 MILLIGRAM(S): at 15:44

## 2023-02-04 RX ADMIN — HEPARIN SODIUM 2000 UNIT(S)/HR: 5000 INJECTION INTRAVENOUS; SUBCUTANEOUS at 00:57

## 2023-02-04 RX ADMIN — CHLORHEXIDINE GLUCONATE 15 MILLILITER(S): 213 SOLUTION TOPICAL at 18:36

## 2023-02-04 RX ADMIN — LACTULOSE 20 GRAM(S): 10 SOLUTION ORAL at 11:12

## 2023-02-04 RX ADMIN — HEPARIN SODIUM 1800 UNIT(S)/HR: 5000 INJECTION INTRAVENOUS; SUBCUTANEOUS at 21:58

## 2023-02-04 RX ADMIN — Medication 50 MILLIEQUIVALENT(S): at 05:30

## 2023-02-04 RX ADMIN — HEPARIN SODIUM 2000 UNIT(S)/HR: 5000 INJECTION INTRAVENOUS; SUBCUTANEOUS at 07:03

## 2023-02-04 RX ADMIN — CHLORHEXIDINE GLUCONATE 15 MILLILITER(S): 213 SOLUTION TOPICAL at 05:31

## 2023-02-04 RX ADMIN — MEROPENEM 100 MILLIGRAM(S): 1 INJECTION INTRAVENOUS at 13:00

## 2023-02-04 RX ADMIN — Medication 50 MILLIGRAM(S): at 05:30

## 2023-02-04 RX ADMIN — POLYETHYLENE GLYCOL 3350 17 GRAM(S): 17 POWDER, FOR SOLUTION ORAL at 11:13

## 2023-02-04 RX ADMIN — Medication 2: at 12:11

## 2023-02-04 NOTE — PROGRESS NOTE ADULT - SUBJECTIVE AND OBJECTIVE BOX
seen and examined  intubated/ventilated        PAST HISTORY  --------------------------------------------------------------------------------  No significant changes to PMH, PSH, FHx, SHx, unless otherwise noted    ALLERGIES & MEDICATIONS  --------------------------------------------------------------------------------  Allergies    No Known Allergies    Intolerances      Standing Inpatient Medications  chlorhexidine 0.12% Liquid 15 milliLiter(s) Oral Mucosa every 12 hours  chlorhexidine 2% Cloths 1 Application(s) Topical daily  dexAMETHasone  Injectable 6 milliGRAM(s) IV Push daily  dexMEDEtomidine Infusion 0.2 MICROgram(s)/kG/Hr IV Continuous <Continuous>  fentaNYL   Infusion. 0.5 MICROgram(s)/kG/Hr IV Continuous <Continuous>  glucagon  Injectable 1 milliGRAM(s) IntraMuscular once  heparin  Infusion. 2400 Unit(s)/Hr IV Continuous <Continuous>  insulin lispro (ADMELOG) corrective regimen sliding scale   SubCutaneous every 6 hours  lactulose Syrup 20 Gram(s) Oral every 4 hours  meropenem  IVPB 500 milliGRAM(s) IV Intermittent every 24 hours  norepinephrine Infusion 0.05 MICROgram(s)/kG/Min IV Continuous <Continuous>  pantoprazole  Injectable 40 milliGRAM(s) IV Push daily  polyethylene glycol 3350 17 Gram(s) Oral daily  senna 2 Tablet(s) Oral at bedtime  sodium bicarbonate  Injectable 100 milliEquivalent(s) IV Push once    PRN Inpatient Medications  acetaminophen  Suppository .. 650 milliGRAM(s) Rectal every 4 hours PRN  heparin   Injectable 9000 Unit(s) IV Push every 6 hours PRN  heparin   Injectable 4000 Unit(s) IV Push every 6 hours PRN          VITALS/PHYSICAL EXAM  --------------------------------------------------------------------------------  T(C): 36.7 (02-04-23 @ 08:00), Max: 37.2 (02-03-23 @ 16:00)  HR: 52 (02-04-23 @ 11:00) (48 - 68)  BP: 109/47 (02-04-23 @ 10:45) (100/44 - 138/53)  RR: 25 (02-04-23 @ 11:00) (0 - 42)  SpO2: 100% (02-04-23 @ 11:00) (98% - 100%)  Wt(kg): --        02-03-23 @ 07:01  -  02-04-23 @ 07:00  --------------------------------------------------------  IN: 3899.6 mL / OUT: 3165 mL / NET: 734.6 mL    02-04-23 @ 07:01  -  02-04-23 @ 12:07  --------------------------------------------------------  IN: 666.4 mL / OUT: 350 mL / NET: 316.4 mL      Physical Exam:  	Gen: intubated/ventilated  	Pulm:  B/L osman  	CV:  S1S2; no rub  	Abd: +distended    LABS/STUDIES  --------------------------------------------------------------------------------              9.7    22.42 >-----------<  210      [02-04-23 @ 05:07]              27.6     130  |  92  |  72  ----------------------------<  254      [02-04-23 @ 05:07]  3.1   |  26  |  3.3        Ca     10.0     [02-04-23 @ 05:07]      Mg     2.2     [02-04-23 @ 05:07]      Phos  6.6     [02-03-23 @ 05:44]    TPro  4.4  /  Alb  2.5  /  TBili  0.5  /  DBili  x   /  AST  6   /  ALT  14  /  AlkPhos  126  [02-04-23 @ 05:07]      PTT: 97.0       [02-04-23 @ 06:05]      Creatinine Trend:  SCr 3.3 [02-04 @ 05:07]  SCr 3.6 [02-03 @ 22:55]  SCr 4.2 [02-03 @ 05:44]  SCr 4.8 [02-02 @ 23:59]  SCr 3.4 [02-02 @ 17:15]    Urinalysis - [01-31-23 @ 20:15]      Color Orange / Appearance Turbid / SG 1.012 / pH 6.5      Gluc Negative / Ketone Negative  / Bili Negative / Urobili <2 mg/dL       Blood Small / Protein 300 mg/dL / Leuk Est Large / Nitrite Negative      RBC 6 / WBC >720 / Hyaline >145 / Gran  / Sq Epi  / Non Sq Epi >27 / Bacteria Many      Ferritin 347      [02-01-23 @ 10:42]  HbA1c 5.6      [12-20-19 @ 07:09]

## 2023-02-04 NOTE — PROGRESS NOTE ADULT - ASSESSMENT
IMPRESSION:    Acute hypoxemic resp failure still intubated on 80%/ 16  ARDS  Sepsis POA  Septic shock  ESBL UTI  G+C bacteremia/ COAG - STAP  COVID +  NOLA  likely ATN vs prerenal imroving  Possible CAP  HO congentital unilateral kidney  HO A FIb not  on AC?      PLAN:    CNS: Sedate with fentanyl + precedex, DAILY sat    HEENT: Oral care    PULMONARY:  HOB @ 45 degrees.    Vent changes as follows: ARDSNET Vent setting.  RR to 26, repeat ABG 1 hour, if becoming hypercapnic increase TV to 400  monitor P/P/DP, repeat CXR daily, monitor transplumonary PEEP    CARDIOVASCULAR: d/c vaso, wean pressors, ECHO, change stress dose steroids to COVID dexamethasone protocol    GI: GI prophylaxis. feeding at goal, increase bowel regimen today, target BM    RENAL:  Follow up lytes.  Correct as needed. renal US. renal fup.  Maintain roughly net even    INFECTIOUS DISEASE: Follow up cultures. Meropenem for ESBL Proteus, ID eval.  Dexamethasone for 6 more days    HEMATOLOGICAL: patient has a fib not on ac, hep    ENDOCRINE:  Follow up FS.  Insulin protocol if needed.    MUSCULOSKELETAL: bedrest    MICU.  L NATTY/ fel[place]

## 2023-02-04 NOTE — PROGRESS NOTE ADULT - ASSESSMENT
73 yo female kth afib, htn, solitary kidney presented from [facility] for SOB with COVID four days prior to admission     #Acute Hypoxic Respiratory Failure  #ARDS  - CT Chest No Cont (01.31.23 @ 21:17):  Mild right pleural effusion with adjacent  atelectasis. Bibasilar atelectatic changes versus scarring. Partial   obstruction of the distal right lower lobe bronchioles. Otherwise no  evidence of central endobronchial obstruction. No focal consolidation.  Right upper lobe pulmonary cyst (4-130). No pleural effusion. No  pneumothorax.  - Xray Chest 1 View-PORTABLE IMMEDIATE (Xray Chest 1 View-PORTABLE IMMEDIATE .) (02.01.23 @ 06:31):  Worsening opacifications and effusions. Support devices as described.    #Septic Shock secondary to COVID/UTI   - Blood Cx 1/31 Coag NEGATIVE staph - likely contaminant     #Proteus ESBL UTI - Urine Cx 1/31     #COVID-19 severe  #NOLA     #Solitary Kidney     Recommendations  - continue meropenem 500 mg q 24 hours   - wean pressors as tolerated   - steroids/ARDS management per primary     Please call or message on [employer] Teams if with any questions.  Spectra [phone]

## 2023-02-04 NOTE — PROGRESS NOTE ADULT - SUBJECTIVE AND OBJECTIVE BOX
Patient is a 72y old  Female who presents with a chief complaint of respiratory distress (04 Feb 2023 07:19)        Over Night Events:    patient pulled out a-line    ROS:     All ROS are negative except HPI           PHYSICAL EXAM    ICU Vital Signs Last 24 Hrs  T(C): 36.7 (04 Feb 2023 08:00), Max: 37.2 (03 Feb 2023 16:00)  T(F): 98.1 (04 Feb 2023 08:00), Max: 99 (03 Feb 2023 16:00)  HR: 54 (04 Feb 2023 08:30) (48 - 68)  BP: 119/53 (04 Feb 2023 08:30) (104/44 - 138/53)  BP(mean): 81 (04 Feb 2023 08:30) (54 - 90)  ABP: 124/50 (03 Feb 2023 22:30) (116/44 - 140/54)  ABP(mean): 74 (03 Feb 2023 22:30) (64 - 78)  RR: 25 (04 Feb 2023 08:30) (0 - 42)  SpO2: 100% (04 Feb 2023 08:30) (98% - 100%)    O2 Parameters below as of 04 Feb 2023 08:00  Patient On (Oxygen Delivery Method): ventilator    O2 Concentration (%): 70        Constitutional: no acute distress, well nourished well developed  Neuro: moving all 4 limbs spontaneously.  follows commands  HEENT: NCAT, anicteric, ETT in place  Neck: no visible lymphadenopathy or goiter  Pulm: synchronous with ventilator, coarse bilateral breath sounds anteriorly  Cardiac: extremities appear pink and well-perfused.  regular rhythm and rate, no murmur detected  Abdomen: non-distended  Extremities: trace dependent edema  Skin: no visible rashes or lesions        02-03-23 @ 07:01  -  02-04-23 @ 07:00  --------------------------------------------------------  IN:    Dexmedetomidine: 355.4 mL    dextrose 5% w/ Additives: 225 mL    Enteral Tube Flush: 120 mL    FentaNYL: 115 mL    Heparin Infusion: 486 mL    IV PiggyBack: 550 mL    Norepinephrine: 464.2 mL    Peptamen A.F.: 1440 mL    Vasopressin: 144 mL  Total IN: 3899.6 mL    OUT:    Indwelling Catheter - Urethral (mL): 3165 mL  Total OUT: 3165 mL    Total NET: 734.6 mL      02-04-23 @ 07:01  -  02-04-23 @ 09:29  --------------------------------------------------------  IN:    Dexmedetomidine: 6.9 mL    FentaNYL: 10 mL    IV PiggyBack: 100 mL    Peptamen A.F.: 120 mL  Total IN: 236.9 mL    OUT:    Indwelling Catheter - Urethral (mL): 125 mL  Total OUT: 125 mL    Total NET: 111.9 mL          LABS:                            9.7    22.42 )-----------( 210      ( 04 Feb 2023 05:07 )             27.6                                               02-04    130<L>  |  92<L>  |  72<HH>  ----------------------------<  254<H>  3.1<L>   |  26  |  3.3<H>    Ca    10.0      04 Feb 2023 05:07  Phos  6.6     02-03  Mg     2.2     02-04    TPro  4.4<L>  /  Alb  2.5<L>  /  TBili  0.5  /  DBili  x   /  AST  6   /  ALT  14  /  AlkPhos  126<H>  02-04      PTT - ( 04 Feb 2023 06:05 )  PTT:97.0 sec                                                                                     LIVER FUNCTIONS - ( 04 Feb 2023 05:07 )  Alb: 2.5 g/dL / Pro: 4.4 g/dL / ALK PHOS: 126 U/L / ALT: 14 U/L / AST: 6 U/L / GGT: x                                                  Culture - Sputum (collected 03 Feb 2023 09:46)  Source: Trach Asp Tracheal Aspirate  Gram Stain (03 Feb 2023 23:04):    Few polymorphonuclear leukocytes per low power field    Rare Squamous epithelial cells per low power field    Rare Gram positive cocci in pairs per oil power field                                                   Mode: AC/ CMV (Assist Control/ Continuous Mandatory Ventilation)  RR (machine): 32  TV (machine): 370  FiO2: 70  PEEP: 16  ITime: 1  MAP: 22  PIP: 32                                      ABG - ( 04 Feb 2023 03:54 )  pH, Arterial: 7.41  pH, Blood: x     /  pCO2: 40    /  pO2: 132   / HCO3: 25    / Base Excess: 0.7   /  SaO2: 97.6                MEDICATIONS  (STANDING):  chlorhexidine 0.12% Liquid 15 milliLiter(s) Oral Mucosa every 12 hours  chlorhexidine 2% Cloths 1 Application(s) Topical daily  dexMEDEtomidine Infusion 0.2 MICROgram(s)/kG/Hr (5.5 mL/Hr) IV Continuous <Continuous>  fentaNYL   Infusion. 0.5 MICROgram(s)/kG/Hr (5.5 mL/Hr) IV Continuous <Continuous>  glucagon  Injectable 1 milliGRAM(s) IntraMuscular once  heparin  Infusion. 2400 Unit(s)/Hr (24 mL/Hr) IV Continuous <Continuous>  hydrocortisone sodium succinate Injectable 50 milliGRAM(s) IV Push every 6 hours  insulin lispro (ADMELOG) corrective regimen sliding scale   SubCutaneous every 6 hours  meropenem  IVPB 500 milliGRAM(s) IV Intermittent every 24 hours  norepinephrine Infusion 0.05 MICROgram(s)/kG/Min (5.16 mL/Hr) IV Continuous <Continuous>  pantoprazole  Injectable 40 milliGRAM(s) IV Push daily  polyethylene glycol 3350 17 Gram(s) Oral daily  senna 2 Tablet(s) Oral at bedtime  sodium bicarbonate  Injectable 100 milliEquivalent(s) IV Push once  vasopressin Infusion 0.04 Unit(s)/Min (6 mL/Hr) IV Continuous <Continuous>    MEDICATIONS  (PRN):  acetaminophen  Suppository .. 650 milliGRAM(s) Rectal every 4 hours PRN Temp greater or equal to 38.5C (101.3F)  heparin   Injectable 9000 Unit(s) IV Push every 6 hours PRN For aPTT less than 40  heparin   Injectable 4000 Unit(s) IV Push every 6 hours PRN For aPTT between 40 - 57      New X-rays reviewed:       ECHO reviewed    CXR interpreted by me:    2/4  images and radiologist read reviewed, by my read demonstrating stable mild bilateral infiltrate

## 2023-02-04 NOTE — CHART NOTE - NSCHARTNOTEFT_GEN_A_CORE
Patient's a-line was pulled out.  Attempted to place a new radial a-line; however, due to artery spasms was not able to get blood return. Patient did not consent for femoral arterial line at this time. Will attempt in the am again. Patient remains on cuff pressure.

## 2023-02-04 NOTE — PROGRESS NOTE ADULT - ASSESSMENT
NOLA / last creat 0.9 on 1/3/23 per HIE / hx of solitary kidney   Likely ATN (hypotension, COVID19/ARDS) / CRS / heavy NSAID use  Hyponatremia improving with iv hydration  HAGMA d/t renal failure / r/o lactic acidosis/DKA  Acute respiratory failure / pulmonary edema / COVID19 / ARDS   Septic shock on pressors   - non oliguric   - creat trending down    - on MV  - non oliguric   - strict i/o   -  corrected calcium around 11 / check VIT d / PTH  - ph noted / renagel 1/1/1  - chnage meropenem to q 12   - no acute indication for RRT   - hypertrophic right kidney on CT / will need repeat sono in 24-48h  - will follow

## 2023-02-04 NOTE — PROGRESS NOTE ADULT - SUBJECTIVE AND OBJECTIVE BOX
PATRICIA SPRAGUE  72y, Female  Allergy: No Known Allergies      LOS  4d    CHIEF COMPLAINT: respiratory distress (03 Feb 2023 11:38)      INTERVAL EVENTS/HPI  - No acute events overnight  - T(F): , Max: 99 (02-03-23 @ 08:00)  - FIO2 70, on vaso, levophed requirements decreasing   - WBC Count: 22.42 (02-04-23 @ 05:07)  WBC Count: 29.10 (02-03-23 @ 05:44)     - Creatinine, Serum: 3.3 (02-04-23 @ 05:07)  Creatinine, Serum: 3.6 (02-03-23 @ 22:55)       ROS  unable to obtain history secondary to patient's mental status and/or sedation      VITALS:  T(F): 98.8, Max: 99 (02-03-23 @ 08:00)  HR: 52  BP: 110/50  RR: 31Vital Signs Last 24 Hrs  T(C): 37.1 (04 Feb 2023 04:00), Max: 37.2 (03 Feb 2023 08:00)  T(F): 98.8 (04 Feb 2023 04:00), Max: 99 (03 Feb 2023 08:00)  HR: 52 (04 Feb 2023 07:00) (48 - 76)  BP: 110/50 (04 Feb 2023 07:00) (104/44 - 138/53)  BP(mean): 73 (04 Feb 2023 07:00) (54 - 90)  RR: 31 (04 Feb 2023 07:00) (0 - 42)  SpO2: 100% (04 Feb 2023 07:00) (98% - 100%)    Parameters below as of 04 Feb 2023 07:00  Patient On (Oxygen Delivery Method): ventilator    O2 Concentration (%): 70    PHYSICAL EXAM:  Gen: intubated  HEENT: Normocephalic, atraumatic  Neck: supple, no lymphadenopathy  CV: Regular rate & regular rhythm  Lungs: decreased BS at bases, no fremitus  Abdomen: Soft, BS present  Ext: Warm, well perfused  Neuro: non focal, awake  Skin: no rash, no erythema  Lines: no phlebitis    FH: Non-contributory  Social Hx: Non-contributory    TESTS & MEASUREMENTS:                        9.7    22.42 )-----------( 210      ( 04 Feb 2023 05:07 )             27.6     02-04    130<L>  |  92<L>  |  72<HH>  ----------------------------<  254<H>  3.1<L>   |  26  |  3.3<H>    Ca    10.0      04 Feb 2023 05:07  Phos  6.6     02-03  Mg     2.2     02-04    TPro  4.4<L>  /  Alb  2.5<L>  /  TBili  0.5  /  DBili  x   /  AST  6   /  ALT  14  /  AlkPhos  126<H>  02-04      LIVER FUNCTIONS - ( 04 Feb 2023 05:07 )  Alb: 2.5 g/dL / Pro: 4.4 g/dL / ALK PHOS: 126 U/L / ALT: 14 U/L / AST: 6 U/L / GGT: x               Culture - Sputum (collected 02-03-23 @ 09:46)  Source: Trach Asp Tracheal Aspirate  Gram Stain (02-03-23 @ 23:04):    Few polymorphonuclear leukocytes per low power field    Rare Squamous epithelial cells per low power field    Rare Gram positive cocci in pairs per oil power field    Culture - Urine (collected 01-31-23 @ 20:15)  Source: Clean Catch Clean Catch (Midstream)  Final Report (02-03-23 @ 07:37):    >100,000 CFU/ml Proteus mirabilis ESBL  Organism: Proteus mirabilis ESBL (02-03-23 @ 07:37)  Organism: Proteus mirabilis ESBL (02-03-23 @ 07:37)      -  Amikacin: S <=16      -  Amoxicillin/Clavulanic Acid: S <=8/4      -  Ampicillin: R >16 These ampicillin results predict results for amoxicillin      -  Ampicillin/Sulbactam: S <=4/2 Enterobacter, Klebsiella aerogenes, Citrobacter, and Serratia may develop resistance during prolonged therapy (3-4 days)      -  Aztreonam: R <=4      -  Cefazolin: R >16 For uncomplicated UTI with K. pneumoniae, E. coli, or P. mirablis: NAGI <=16 is sensitive and NAGI >=32 is resistant. This also predicts results for oral agents cefaclor, cefdinir, cefpodoxime, cefprozil, cefuroxime axetil, cephalexin and locarbef for uncomplicated UTI. Note that some isolates may be susceptible to these agents while testing resistant to cefazolin.      -  Cefepime: R >16      -  Ceftriaxone: R >32 Enterobacter, Klebsiella aerogenes, Citrobacter, and Serratia may develop resistance during prolonged therapy      -  Cefuroxime: R >16      -  Ciprofloxacin: R >2      -  Ertapenem: S <=0.5      -  Gentamicin: S <=2      -  Levofloxacin: R >4      -  Meropenem: S <=1      -  Nitrofurantoin: R >64 Should not be used to treat pyelonephritis      -  Piperacillin/Tazobactam: S <=8      -  Tobramycin: S <=2      -  Trimethoprim/Sulfamethoxazole: R >2/38      Method Type: NAGI    Culture - Blood (collected 01-31-23 @ 19:03)  Source: .Blood Blood-Peripheral  Preliminary Report (02-02-23 @ 01:02):    No growth to date.    Culture - Blood (collected 01-31-23 @ 19:03)  Source: .Blood Blood-Peripheral  Gram Stain (02-02-23 @ 08:28):    Growth in aerobic bottle: Gram Positive Cocci in Clusters  Final Report (02-02-23 @ 21:27):    Growth in aerobic bottle: Staphylococcus epidermidis    Coag Negative Staphylococcus    Single set isolate, possible contaminant. Contact    Microbiology if susceptibility testing clinically    indicated.    ***Blood Panel PCR results on this specimen are available    approximately 3 hours after the Gram stain result.***    Gram stain, PCR, and/or culture results may not always    correspond due to difference in methodologies.    ************************************************************    This PCR assay was performed by multiplex PCR. This    Assay tests for 66 bacterial and resistance gene targets.    Please refer to the [facility] test directory    at [email or web address] for details.  Organism: Blood Culture PCR (02-02-23 @ 21:27)  Organism: Blood Culture PCR (02-02-23 @ 21:27)      -  Staphylococcus epidermidis, Methicillin resistant: Detec      Method Type: PCR        Lactate, Blood: 1.5 mmol/L (02-01-23 @ 10:42)  Lactate, Blood: 1.0 mmol/L (01-31-23 @ 19:04)  Blood Gas Venous - Lactate: 1.10 mmol/L (01-31-23 @ 18:21)      INFECTIOUS DISEASES TESTING  Procalcitonin, Serum: 49.60 (02-02-23 @ 12:05)  MRSA PCR Result.: Negative (02-01-23 @ 10:42)  Procalcitonin, Serum: 37.20 (02-01-23 @ 10:42)  Legionella Antigen, Urine: Negative (02-01-23 @ 10:10)  Procalcitonin, Serum: 14.30 (02-01-23 @ 05:55)      INFLAMMATORY MARKERS  C-Reactive Protein, Serum: 107.1 mg/L (02-01-23 @ 10:42)      RADIOLOGY & ADDITIONAL TESTS:  I have personally reviewed the last available Chest xray  CXR  Xray Chest 1 View- PORTABLE-Urgent:   ACC: [id number] EXAM:  XR CHEST PORTABLE URGENT 1V   ORDERED BY: NEWTON HEARD     PROCEDURE DATE:  02/03/2023          INTERPRETATION:  Clinical History / Reason for exam: Respiratory failure.    Comparison : Chest radiograph prior day.    Technique/Positioning: Frontal portable.    Findings:    Support devices: Enteric catheter extends below the hemidiaphragm. Left   central venous catheter. The patient is intubated. Telemetry leads are   seen.    Cardiac/mediastinum/hilum: Unremarkable.    Lung parenchyma/Pleura: Bilateral opacities    Skeleton/soft tissues: Degenerative changes of the shoulders.    Impression:    Bilateral opacifications. Support devices as described. Without   difference.        --- End of Report ---            MARISEL VACA MD; Attending Interventional Radiologist  This document has been electronically signed. Feb  3 2023  1:33PM (02-03-23 @ 12:50)      CT      CARDIOLOGY TESTING  12 Lead ECG:   Systolic  mmHg    Diastolic BP 52 mmHg    Ventricular Rate 74 BPM    Atrial Rate 74 BPM    QRS Duration 113 ms    Q-T Interval 363 ms    QTC Calculation(Bazett) 403 ms    R Axis 107 degrees    T Axis 43 degrees    Diagnosis Line Atrial fibrillation  Rightward axis  Nonspecific T wave abnormality , probably digitalis effect  Abnormal ECG    Confirmed by Behuria, Supreeti ([id number]) on 2/3/2023 1:21:01 PM (02-03-23 @ 09:40)  12 Lead ECG:   Ventricular Rate 59 BPM    QRS Duration 118 ms    Q-T Interval 448 ms    QTC Calculation(Bazett) 443 ms    R Axis 86 degrees    T Axis 19 degrees    Diagnosis Line Atrial fibrillation with slow ventricular response  Low voltage QRS    Abnormal ECG    Confirmed by MAI RHOADES MD ([id number]) on 2/1/2023 7:24:08 AM (01-31-23 @ 20:19)      MEDICATIONS  chlorhexidine 0.12% Liquid 15 Oral Mucosa every 12 hours  chlorhexidine 2% Cloths 1 Topical daily  dexMEDEtomidine Infusion 0.2 IV Continuous <Continuous>  fentaNYL   Infusion. 0.5 IV Continuous <Continuous>  glucagon  Injectable 1 IntraMuscular once  heparin  Infusion. 2400 IV Continuous <Continuous>  hydrocortisone sodium succinate Injectable 50 IV Push every 6 hours  insulin lispro (ADMELOG) corrective regimen sliding scale  SubCutaneous every 6 hours  meropenem  IVPB 500 IV Intermittent every 24 hours  norepinephrine Infusion 0.05 IV Continuous <Continuous>  pantoprazole  Injectable 40 IV Push daily  polyethylene glycol 3350 17 Oral daily  potassium chloride  20 mEq/100 mL IVPB 20 IV Intermittent every 2 hours  senna 2 Oral at bedtime  sodium bicarbonate  Injectable 100 IV Push once  vasopressin Infusion 0.04 IV Continuous <Continuous>      WEIGHT  Weight (kg): 110 (01-31-23 @ 19:58)  Creatinine, Serum: 3.3 mg/dL (02-04-23 @ 05:07)  Creatinine, Serum: 3.6 mg/dL (02-03-23 @ 22:55)      ANTIBIOTICS:  meropenem  IVPB 500 milliGRAM(s) IV Intermittent every 24 hours      All available historical records have been reviewed

## 2023-02-04 NOTE — PROGRESS NOTE ADULT - SUBJECTIVE AND OBJECTIVE BOX
Patient is a 72y old  Female who presents with a chief complaint of respiratory distress (04 Feb 2023 12:07)      INTERVAL HPI/OVERNIGHT EVENTS:   A line removed by patient   Afebrile, hemodynamically stable     ICU Vital Signs Last 24 Hrs  T(C): 36.5 (04 Feb 2023 12:00), Max: 37.2 (03 Feb 2023 16:00)  T(F): 97.7 (04 Feb 2023 12:00), Max: 99 (03 Feb 2023 16:00)  HR: 48 (04 Feb 2023 12:45) (46 - 68)  BP: 103/44 (04 Feb 2023 12:45) (99/43 - 138/53)  BP(mean): 63 (04 Feb 2023 12:45) (54 - 90)  ABP: 124/50 (03 Feb 2023 22:30) (120/46 - 140/54)  ABP(mean): 74 (03 Feb 2023 22:30) (66 - 78)  RR: 25 (04 Feb 2023 12:45) (0 - 42)  SpO2: 100% (04 Feb 2023 12:45) (98% - 100%)    O2 Parameters below as of 04 Feb 2023 08:00  Patient On (Oxygen Delivery Method): ventilator    O2 Concentration (%): 70      I&O's Summary    03 Feb 2023 07:01  -  04 Feb 2023 07:00  --------------------------------------------------------  IN: 3899.6 mL / OUT: 3165 mL / NET: 734.6 mL    04 Feb 2023 07:01  -  04 Feb 2023 13:39  --------------------------------------------------------  IN: 666.4 mL / OUT: 350 mL / NET: 316.4 mL      Mode: AC/ CMV (Assist Control/ Continuous Mandatory Ventilation)  RR (machine): 26  TV (machine): 370  FiO2: 70  PEEP: 16  ITime: 1  MAP: 22  PIP: 32      LABS:                        9.7    22.42 )-----------( 210      ( 04 Feb 2023 05:07 )             27.6     02-04    130<L>  |  92<L>  |  72<HH>  ----------------------------<  254<H>  3.1<L>   |  26  |  3.3<H>    Ca    10.0      04 Feb 2023 05:07  Phos  6.6     02-03  Mg     2.2     02-04    TPro  4.4<L>  /  Alb  2.5<L>  /  TBili  0.5  /  DBili  x   /  AST  6   /  ALT  14  /  AlkPhos  126<H>  02-04    PTT - ( 04 Feb 2023 06:05 )  PTT:97.0 sec    CAPILLARY BLOOD GLUCOSE      POCT Blood Glucose.: 200 mg/dL (04 Feb 2023 11:51)  POCT Blood Glucose.: 255 mg/dL (04 Feb 2023 04:30)  POCT Blood Glucose.: 216 mg/dL (03 Feb 2023 22:55)  POCT Blood Glucose.: 177 mg/dL (03 Feb 2023 17:49)    ABG - ( 04 Feb 2023 12:06 )  pH, Arterial: 7.37  pH, Blood: x     /  pCO2: 44    /  pO2: 121   / HCO3: 25    / Base Excess: -0.2  /  SaO2: 96.7                RADIOLOGY & ADDITIONAL TESTS:    Consultant(s) Notes Reviewed:  [x ] YES  [ ] NO    MEDICATIONS  (STANDING):  chlorhexidine 0.12% Liquid 15 milliLiter(s) Oral Mucosa every 12 hours  chlorhexidine 2% Cloths 1 Application(s) Topical daily  dexAMETHasone  Injectable 6 milliGRAM(s) IV Push daily  dexMEDEtomidine Infusion 0.2 MICROgram(s)/kG/Hr (5.5 mL/Hr) IV Continuous <Continuous>  fentaNYL   Infusion. 0.5 MICROgram(s)/kG/Hr (5.5 mL/Hr) IV Continuous <Continuous>  glucagon  Injectable 1 milliGRAM(s) IntraMuscular once  heparin  Infusion. 2400 Unit(s)/Hr (24 mL/Hr) IV Continuous <Continuous>  insulin lispro (ADMELOG) corrective regimen sliding scale   SubCutaneous every 6 hours  lactulose Syrup 20 Gram(s) Oral every 4 hours  meropenem  IVPB 500 milliGRAM(s) IV Intermittent every 24 hours  norepinephrine Infusion 0.05 MICROgram(s)/kG/Min (5.16 mL/Hr) IV Continuous <Continuous>  pantoprazole  Injectable 40 milliGRAM(s) IV Push daily  polyethylene glycol 3350 17 Gram(s) Oral daily  senna 2 Tablet(s) Oral at bedtime  sodium bicarbonate  Injectable 100 milliEquivalent(s) IV Push once    MEDICATIONS  (PRN):  acetaminophen  Suppository .. 650 milliGRAM(s) Rectal every 4 hours PRN Temp greater or equal to 38.5C (101.3F)  heparin   Injectable 9000 Unit(s) IV Push every 6 hours PRN For aPTT less than 40  heparin   Injectable 4000 Unit(s) IV Push every 6 hours PRN For aPTT between 40 - 57      PHYSICAL EXAM:  GENERAL: intubated  HEAD:  Atraumatic, Normocephalic  EYES: EOMI, PERRLA, conjunctiva and sclera clear  NECK: Supple, No JVD, Normal thyroid, no enlarged nodes  NERVOUS SYSTEM:  Alert & Awake. Following commands  CHEST/LUNG: B/L good air entry; No rales, rhonchi, or wheezing  HEART: S1S2 normal, no S3, Regular rate and rhythm; No murmurs  ABDOMEN: Obese, Soft, Nontender, Nondistended; Bowel sounds present  EXTREMITIES:  2+ Peripheral Pulses, No clubbing, cyanosis, or edema  LYMPH: No lymphadenopathy noted  SKIN: No rashes or lesions    Care Discussed with Consultants/Other Providers [ x] YES  [ ] NO

## 2023-02-04 NOTE — PROGRESS NOTE ADULT - ASSESSMENT
# Acute hypoxemic resp failure still intubated on 80%/ 16  # ARDS  # COVID +  # Possible CAP  - Vent changes as follows: ARDSNET Vent setting.  RR to 26, repeat ABG 1 hour, if becoming hypercapnic increase TV to 400  - Sedate with fentanyl + precedex, DAILY sat  - Change stress dose steroids to COVID dexamethasone protocol  - follow up ET culture    # Sepsis POA  # Septic shock  # ESBL UTI  # G+C bacteremia/ COAG - STAP  - on Levo, dc vaso  - Meropenem for ESBL Proteus, ID eval.     # NOLA  likely ATN vs prerenal imroving  # HO congentital unilateral kidney  - monitor urine output, keep net even   - monitor creatinine  - renal ultrasound    # HO A FIb not  on AC?  - on heparin    Nutrition: tube feeds  DVT: heparin   Burger # Acute hypoxemic resp failure still intubated on 80%/ 16  # ARDS  # COVID +  # Possible CAP  - Vent changes as follows: ARDSNET Vent setting.  RR to 26, repeat ABG 1 hour, if becoming hypercapnic increase TV to 400  - Sedate with fentanyl + precedex, DAILY sat  - Change stress dose steroids to COVID dexamethasone protocol  - follow up ET culture    # Sepsis POA  # Septic shock  # ESBL UTI  # G+C bacteremia/ COAG - STAP  - on Levo, dc vaso  - Meropenem for ESBL Proteus, ID eval.     # NOLA  likely ATN vs prerenal imroving  # HO congentital unilateral kidney  - monitor urine output, keep net even   - monitor creatinine  - renal ultrasound    # HO A FIb not  on AC?  - on heparin    #hypercalcemia   - fu Vitamin D  - fu PTH    Nutrition: tube feeds  DVT: heparin   Burger

## 2023-02-05 DIAGNOSIS — F44.5 CONVERSION DISORDER WITH SEIZURES OR CONVULSIONS: ICD-10-CM

## 2023-02-05 DIAGNOSIS — J96.01 ACUTE RESPIRATORY FAILURE WITH HYPOXIA: ICD-10-CM

## 2023-02-05 LAB
24R-OH-CALCIDIOL SERPL-MCNC: <6 NG/ML — LOW (ref 30–80)
ALBUMIN SERPL ELPH-MCNC: 2.8 G/DL — LOW (ref 3.5–5.2)
ALP SERPL-CCNC: 128 U/L — HIGH (ref 30–115)
ALT FLD-CCNC: 14 U/L — SIGNIFICANT CHANGE UP (ref 0–41)
ANION GAP SERPL CALC-SCNC: 10 MMOL/L — SIGNIFICANT CHANGE UP (ref 7–14)
ANION GAP SERPL CALC-SCNC: 12 MMOL/L — SIGNIFICANT CHANGE UP (ref 7–14)
ANION GAP SERPL CALC-SCNC: 9 MMOL/L — SIGNIFICANT CHANGE UP (ref 7–14)
APTT BLD: 58.4 SEC — HIGH (ref 27–39.2)
APTT BLD: 67.6 SEC — HIGH (ref 27–39.2)
AST SERPL-CCNC: 7 U/L — SIGNIFICANT CHANGE UP (ref 0–41)
BASE EXCESS BLDA CALC-SCNC: 1.6 MMOL/L — SIGNIFICANT CHANGE UP (ref -2–3)
BILIRUB SERPL-MCNC: 0.5 MG/DL — SIGNIFICANT CHANGE UP (ref 0.2–1.2)
BUN SERPL-MCNC: 75 MG/DL — CRITICAL HIGH (ref 10–20)
BUN SERPL-MCNC: 76 MG/DL — CRITICAL HIGH (ref 10–20)
BUN SERPL-MCNC: 78 MG/DL — CRITICAL HIGH (ref 10–20)
CALCIUM SERPL-MCNC: 10.2 MG/DL — SIGNIFICANT CHANGE UP (ref 8.4–10.4)
CALCIUM SERPL-MCNC: 10.4 MG/DL — SIGNIFICANT CHANGE UP (ref 8.4–10.5)
CALCIUM SERPL-MCNC: 10.6 MG/DL — HIGH (ref 8.4–10.5)
CHLORIDE SERPL-SCNC: 96 MMOL/L — LOW (ref 98–110)
CHLORIDE SERPL-SCNC: 97 MMOL/L — LOW (ref 98–110)
CHLORIDE SERPL-SCNC: 98 MMOL/L — SIGNIFICANT CHANGE UP (ref 98–110)
CO2 SERPL-SCNC: 27 MMOL/L — SIGNIFICANT CHANGE UP (ref 17–32)
CO2 SERPL-SCNC: 30 MMOL/L — SIGNIFICANT CHANGE UP (ref 17–32)
CO2 SERPL-SCNC: 31 MMOL/L — SIGNIFICANT CHANGE UP (ref 17–32)
CREAT SERPL-MCNC: 2.2 MG/DL — HIGH (ref 0.7–1.5)
CREAT SERPL-MCNC: 2.4 MG/DL — HIGH (ref 0.7–1.5)
CREAT SERPL-MCNC: 2.6 MG/DL — HIGH (ref 0.7–1.5)
CULTURE RESULTS: SIGNIFICANT CHANGE UP
EGFR: 19 ML/MIN/1.73M2 — LOW
EGFR: 21 ML/MIN/1.73M2 — LOW
EGFR: 24 ML/MIN/1.73M2 — LOW
GAS PNL BLDA: SIGNIFICANT CHANGE UP
GLUCOSE BLDC GLUCOMTR-MCNC: 140 MG/DL — HIGH (ref 70–99)
GLUCOSE BLDC GLUCOMTR-MCNC: 182 MG/DL — HIGH (ref 70–99)
GLUCOSE BLDC GLUCOMTR-MCNC: 240 MG/DL — HIGH (ref 70–99)
GLUCOSE SERPL-MCNC: 134 MG/DL — HIGH (ref 70–99)
GLUCOSE SERPL-MCNC: 149 MG/DL — HIGH (ref 70–99)
GLUCOSE SERPL-MCNC: 244 MG/DL — HIGH (ref 70–99)
HCO3 BLDA-SCNC: 28 MMOL/L — SIGNIFICANT CHANGE UP (ref 21–28)
HCT VFR BLD CALC: 27.7 % — LOW (ref 37–47)
HGB BLD-MCNC: 9.4 G/DL — LOW (ref 12–16)
HOROWITZ INDEX BLDA+IHG-RTO: 70 — SIGNIFICANT CHANGE UP
MAGNESIUM SERPL-MCNC: 1.9 MG/DL — SIGNIFICANT CHANGE UP (ref 1.8–2.4)
MAGNESIUM SERPL-MCNC: 2 MG/DL — SIGNIFICANT CHANGE UP (ref 1.8–2.4)
MCHC RBC-ENTMCNC: 28.3 PG — SIGNIFICANT CHANGE UP (ref 27–31)
MCHC RBC-ENTMCNC: 33.9 G/DL — SIGNIFICANT CHANGE UP (ref 32–37)
MCV RBC AUTO: 83.4 FL — SIGNIFICANT CHANGE UP (ref 81–99)
NRBC # BLD: 0 /100 WBCS — SIGNIFICANT CHANGE UP (ref 0–0)
PCO2 BLDA: 48 MMHG — SIGNIFICANT CHANGE UP (ref 25–48)
PH BLDA: 7.37 — SIGNIFICANT CHANGE UP (ref 7.35–7.45)
PLATELET # BLD AUTO: 211 K/UL — SIGNIFICANT CHANGE UP (ref 130–400)
PO2 BLDA: 206 MMHG — HIGH (ref 83–108)
POTASSIUM SERPL-MCNC: 2.7 MMOL/L — CRITICAL LOW (ref 3.5–5)
POTASSIUM SERPL-MCNC: 3.7 MMOL/L — SIGNIFICANT CHANGE UP (ref 3.5–5)
POTASSIUM SERPL-MCNC: 3.7 MMOL/L — SIGNIFICANT CHANGE UP (ref 3.5–5)
POTASSIUM SERPL-SCNC: 2.7 MMOL/L — CRITICAL LOW (ref 3.5–5)
POTASSIUM SERPL-SCNC: 3.7 MMOL/L — SIGNIFICANT CHANGE UP (ref 3.5–5)
POTASSIUM SERPL-SCNC: 3.7 MMOL/L — SIGNIFICANT CHANGE UP (ref 3.5–5)
PROT SERPL-MCNC: 5 G/DL — LOW (ref 6–8)
PTH-INTACT FLD-MCNC: 470 PG/ML — HIGH (ref 15–65)
RBC # BLD: 3.32 M/UL — LOW (ref 4.2–5.4)
RBC # FLD: 13.2 % — SIGNIFICANT CHANGE UP (ref 11.5–14.5)
SAO2 % BLDA: 97 % — SIGNIFICANT CHANGE UP (ref 94–98)
SODIUM SERPL-SCNC: 136 MMOL/L — SIGNIFICANT CHANGE UP (ref 135–146)
SODIUM SERPL-SCNC: 136 MMOL/L — SIGNIFICANT CHANGE UP (ref 135–146)
SODIUM SERPL-SCNC: 138 MMOL/L — SIGNIFICANT CHANGE UP (ref 135–146)
SPECIMEN SOURCE: SIGNIFICANT CHANGE UP
VIT D25+D1,25 OH+D1,25 PNL SERPL-MCNC: 25.7 PG/ML — SIGNIFICANT CHANGE UP (ref 19.9–79.3)
WBC # BLD: 25.81 K/UL — HIGH (ref 4.8–10.8)
WBC # FLD AUTO: 25.81 K/UL — HIGH (ref 4.8–10.8)

## 2023-02-05 PROCEDURE — 99291 CRITICAL CARE FIRST HOUR: CPT | Mod: GC

## 2023-02-05 PROCEDURE — 71045 X-RAY EXAM CHEST 1 VIEW: CPT | Mod: 26

## 2023-02-05 PROCEDURE — 93010 ELECTROCARDIOGRAM REPORT: CPT

## 2023-02-05 RX ORDER — POTASSIUM CHLORIDE 20 MEQ
20 PACKET (EA) ORAL ONCE
Refills: 0 | Status: COMPLETED | OUTPATIENT
Start: 2023-02-05 | End: 2023-02-05

## 2023-02-05 RX ORDER — FUROSEMIDE 40 MG
40 TABLET ORAL ONCE
Refills: 0 | Status: COMPLETED | OUTPATIENT
Start: 2023-02-05 | End: 2023-02-05

## 2023-02-05 RX ORDER — POTASSIUM CHLORIDE 20 MEQ
40 PACKET (EA) ORAL ONCE
Refills: 0 | Status: COMPLETED | OUTPATIENT
Start: 2023-02-05 | End: 2023-02-05

## 2023-02-05 RX ADMIN — SENNA PLUS 2 TABLET(S): 8.6 TABLET ORAL at 22:15

## 2023-02-05 RX ADMIN — PANTOPRAZOLE SODIUM 40 MILLIGRAM(S): 20 TABLET, DELAYED RELEASE ORAL at 11:48

## 2023-02-05 RX ADMIN — Medication 2: at 00:13

## 2023-02-05 RX ADMIN — MEROPENEM 100 MILLIGRAM(S): 1 INJECTION INTRAVENOUS at 18:14

## 2023-02-05 RX ADMIN — Medication 40 MILLIEQUIVALENT(S): at 12:49

## 2023-02-05 RX ADMIN — CHLORHEXIDINE GLUCONATE 15 MILLILITER(S): 213 SOLUTION TOPICAL at 05:10

## 2023-02-05 RX ADMIN — CHLORHEXIDINE GLUCONATE 15 MILLILITER(S): 213 SOLUTION TOPICAL at 18:15

## 2023-02-05 RX ADMIN — CHLORHEXIDINE GLUCONATE 1 APPLICATION(S): 213 SOLUTION TOPICAL at 11:47

## 2023-02-05 RX ADMIN — Medication 40 MILLIGRAM(S): at 12:49

## 2023-02-05 RX ADMIN — Medication 4: at 11:46

## 2023-02-05 RX ADMIN — MEROPENEM 100 MILLIGRAM(S): 1 INJECTION INTRAVENOUS at 05:11

## 2023-02-05 RX ADMIN — Medication 2: at 18:53

## 2023-02-05 RX ADMIN — POLYETHYLENE GLYCOL 3350 17 GRAM(S): 17 POWDER, FOR SOLUTION ORAL at 11:49

## 2023-02-05 RX ADMIN — HEPARIN SODIUM 1800 UNIT(S)/HR: 5000 INJECTION INTRAVENOUS; SUBCUTANEOUS at 22:15

## 2023-02-05 RX ADMIN — Medication 6 MILLIGRAM(S): at 05:10

## 2023-02-05 RX ADMIN — HEPARIN SODIUM 1800 UNIT(S)/HR: 5000 INJECTION INTRAVENOUS; SUBCUTANEOUS at 05:20

## 2023-02-05 RX ADMIN — Medication 100 MILLIEQUIVALENT(S): at 12:48

## 2023-02-05 NOTE — PROGRESS NOTE ADULT - ASSESSMENT
NOLA / last creat 0.9 on 1/3/23 per HIE / hx of solitary kidney   Likely ATN (hypotension, COVID19/ARDS) / CRS / heavy NSAID use  Hyponatremia improving with iv hydration  HAGMA d/t renal failure / r/o lactic acidosis/DKA  Acute respiratory failure / pulmonary edema / COVID19 / ARDS   Septic shock on pressors   - non oliguric   - creat trending down    - on MV  - strict i/o   - corrected calcium around 11 / PTH mediated, will need w/u  - ph noted, repeat  - no need for RRT  - hypertrophic right kidney on CT / 2/4 renal ultrasound reviewed, left kidney not visualized, right kidney large w/o hydronephrosis  - will follow

## 2023-02-05 NOTE — PROGRESS NOTE ADULT - SUBJECTIVE AND OBJECTIVE BOX
Nephrology Progress Note    PATRICIA SPRAGUE  MRN-[id number]  72y  Female    S:  Patient is seen and examined, events over the last 24h noted.    O:  Allergies:  No Known Allergies    Hospital Medications:   MEDICATIONS  (STANDING):  chlorhexidine 0.12% Liquid 15 milliLiter(s) Oral Mucosa every 12 hours  chlorhexidine 2% Cloths 1 Application(s) Topical daily  dexAMETHasone  Injectable 6 milliGRAM(s) IV Push daily  dexMEDEtomidine Infusion 0.2 MICROgram(s)/kG/Hr (5.5 mL/Hr) IV Continuous <Continuous>  fentaNYL   Infusion. 0.5 MICROgram(s)/kG/Hr (5.5 mL/Hr) IV Continuous <Continuous>  glucagon  Injectable 1 milliGRAM(s) IntraMuscular once  heparin  Infusion. 2400 Unit(s)/Hr (24 mL/Hr) IV Continuous <Continuous>  insulin lispro (ADMELOG) corrective regimen sliding scale   SubCutaneous every 6 hours  meropenem  IVPB 500 milliGRAM(s) IV Intermittent every 12 hours  norepinephrine Infusion 0.05 MICROgram(s)/kG/Min (5.16 mL/Hr) IV Continuous <Continuous>  pantoprazole  Injectable 40 milliGRAM(s) IV Push daily  polyethylene glycol 3350 17 Gram(s) Oral daily  senna 2 Tablet(s) Oral at bedtime    MEDICATIONS  (PRN):  acetaminophen  Suppository .. 650 milliGRAM(s) Rectal every 4 hours PRN Temp greater or equal to 38.5C (101.3F)  heparin   Injectable 9000 Unit(s) IV Push every 6 hours PRN For aPTT less than 40  heparin   Injectable 4000 Unit(s) IV Push every 6 hours PRN For aPTT between 40 - 57    Home Medications:  alendronate 70 mg oral tablet: 1 tab(s) orally once a week (2023 00:45)  carvedilol 12.5 mg oral tablet: 1 tab(s) orally 2 times a day (2023 00:48)  cyanocobalamin 1000 mcg oral tablet, extended release: 1 tab(s) orally once a day (2023 00:46)  labetalol 100 mg oral tablet: 1 tab(s) orally 2 times a day (2020 06:59)  losartan-hydrochlorothiazide 50 mg-12.5 mg oral tablet: 1 tab(s) orally once a day (2023 00:46)  NIFEdipine 90 mg oral tablet, extended release: 1 tab(s) orally once a day (2023 00:46)  oxycodone-acetaminophen 5 mg-325 mg oral tablet: 1 tab(s) orally every 6 hours, As Needed - 6) for severe pain  (2020 06:59)  potassium chloride 10 mEq oral capsule, extended release: 2 cap(s) orally once a day (2020 06:59)  rivaroxaban 20 mg oral tablet: 1 tab(s) orally once a day (before a meal) (2020 06:59)  sodium bicarbonate 650 mg oral tablet: 1 tab(s) orally once a day (2023 00:47)      VITALS:  Daily     Daily Weight in k.8 (2023 05:00)  T(F): 98.2 (23 @ 08:00), Max: 98.2 (23 @ 08:00)  HR: 54 (23 @ 14:45)  BP: 134/60 (23 @ 14:45)  RR: 40 (23 @ 14:45)  SpO2: 100% (23 @ 14:45)  Wt(kg): --  I&O's Detail    2023 07:01  -  2023 07:00  --------------------------------------------------------  IN:    Dexmedetomidine: 326.7 mL    Enteral Tube Flush: 60 mL    FentaNYL: 454.5 mL    Heparin Infusion: 454 mL    IV PiggyBack: 150 mL    Norepinephrine: 254.1 mL    Peptamen A.F.: 1260 mL    Vasopressin: 12 mL  Total IN: 2971.3 mL    OUT:    Indwelling Catheter - Urethral (mL): 1950 mL  Total OUT: 1950 mL    Total NET: 1021.3 mL      2023 07:01  -  2023 16:08  --------------------------------------------------------  IN:    Dexmedetomidine: 89.7 mL    Enteral Tube Flush: 105 mL    FentaNYL: 132 mL    Heparin Infusion: 144 mL    IV PiggyBack: 200 mL    Norepinephrine: 74.4 mL    Peptamen A.F.: 480 mL  Total IN: 1225.1 mL    OUT:    Indwelling Catheter - Urethral (mL): 2350 mL  Total OUT: 2350 mL    Total NET: -1124.9 mL        I&O's Summary    2023 07:  -  2023 07:00  --------------------------------------------------------  IN: 2971.3 mL / OUT: 1950 mL / NET: 1021.3 mL    2023 07:01  -  2023 16:08  --------------------------------------------------------  IN: 1225.1 mL / OUT: 2350 mL / NET: -1124.9 mL          PHYSICAL EXAM:  Gen: intubated/ventilated    LABS:  ABG - ( 2023 02:51 )  pH, Arterial: 7.39  pH, Blood: x     /  pCO2: 46    /  pO2: 88    / HCO3: 28    / Base Excess: 2.4   /  SaO2: 96.4            136  |  97<L>  |  75<HH>  ----------------------------<  244<H>  3.7   |  27  |  2.4<H>    Ca    10.4      2023 11:45  Mg     1.9         TPro  5.0<L>  /  Alb  2.8<L>  /  TBili  0.5  /  DBili      /  AST  7   /  ALT  14  /  AlkPhos  128<H>      Phosphorus Level, Serum: 6.6 mg/dL (23 @ 05:44)  Phosphorus Level, Serum: 7.6 mg/dL (23 @ 04:30)    Vitamin D, 1, 25-Dihydroxy: 25.7 pg/mL (23 @ 20:22)  Vitamin D, 25-Hydroxy: <6 ng/mL (23 @ 20:22)  Intact PTH: 470 pg/mL (23 @ 20:22)                          9.4    25.81 )-----------( 211      ( 2023 04:20 )             27.7     Mean Cell Volume: 83.4 fL (23 @ 04:20)    Ferritin, Serum: 347 ng/mL (23 @ 10:42)      Urine Studies:  Urinalysis Basic - ( 2023 20:15 )    Color: Orange / Appearance: Turbid / S.012 / pH:   Gluc:  / Ketone: Negative  / Bili: Negative / Urobili: <2 mg/dL   Blood:  / Protein: 300 mg/dL / Nitrite: Negative   Leuk Esterase: Large / RBC: 6 /HPF / WBC >720 /HPF   Sq Epi:  / Non Sq Epi: >27 /HPF / Bacteria: Many          Culture Results:   Normal Respiratory Samia present ( @ 09:46)  Culture Results:   >100,000 CFU/ml Proteus mirabilis ESBL ( @ 20:15)    Creatinine trend:  Creatinine, Serum: 2.4 mg/dL (23 @ 11:45)  Creatinine, Serum: 2.6 mg/dL (23 @ 04:20)  Creatinine, Serum: 3.3 mg/dL (23 @ 05:07)  Creatinine, Serum: 3.6 mg/dL (23 @ 22:55)  Creatinine, Serum: 4.2 mg/dL (23 @ 05:44)  Creatinine, Serum: 4.8 mg/dL (23 @ 23:59)  Creatinine, Serum: 3.4 mg/dL (23 @ 17:15)      US Kidney and Bladder:   ACC: [id number] EXAM:  US KIDNEYS AND BLADDER   ORDERED BY: NEWTON HEARD     PROCEDURE DATE:  2023          INTERPRETATION:  CLINICAL INFORMATION: Acute kidney injury.    COMPARISON: None available.    TECHNIQUE: Sonography of the kidneys and bladder.    FINDINGS:    Right kidney: 14.8 cm. No hydronephrosis or calculi.    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    Urinary bladder: Burger catheter in place.      IMPRESSION:    No right hydronephrosis.    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    --- End of Report ---            ELLIOT LANDAU MD; Attending Radiologist  This document has been electronically signed. 2023  9:46AM (23 @ 04:10)

## 2023-02-05 NOTE — PROGRESS NOTE ADULT - CRITICAL CARE ATTENDING COMMENT
This patient is critically ill due to the following:  * Hemodynamic instability requiring titration of vasopressors or other vasoactive agents  * Respiratory instability requiring management of invasive ventilation  * Multiple organ failure requiring complex decision-making, and there is a high probability of imminent or life-threatening deterioration in the patient’s condition  * The patient required frequent reassessments and monitoring to ensure response to interventions and therapies.    Critical care time includes time spent evaluating and treating the patient's acute illness as well as time spent reviewing labs, radiology,  and discussing the case with a multidisciplinary team in an effort to prevent further life threatening deterioration or end organ damage. This time is independent of any procedures performed.
This patient is critically ill due to the following:  * Hemodynamic instability requiring titration of vasopressors or other vasoactive agents  * Respiratory instability requiring management of invasive ventilation  * Multiple organ failure requiring complex decision-making, and there is a high probability of imminent or life-threatening deterioration in the patient’s condition  * The patient required frequent reassessments and monitoring to ensure response to interventions and therapies.    Critical care time includes time spent evaluating and treating the patient's acute illness as well as time spent reviewing labs, radiology,  and discussing the case with a multidisciplinary team in an effort to prevent further life threatening deterioration or end organ damage. This time is independent of any procedures performed.

## 2023-02-05 NOTE — PROGRESS NOTE ADULT - ASSESSMENT
IMPRESSION:    Acute hypoxemic resp failure still intubated on 80%/ 16  ARDS  Sepsis POA  Septic shock  ESBL UTI  G+C bacteremia/ COAG - STAP  COVID +  NOLA  likely ATN vs prerenal imroving  Possible CAP  HO congentital unilateral kidney  HO A FIb not  on AC?      PLAN:    CNS: Sedate with fentanyl + precedex, DAILY sat    HEENT: Oral care  ETT day 6    PULMONARY:  HOB @ 45 degrees.    Vent changes as follows: ARDSNET Vent setting.  RR to 22, repeat ABG 1 hour  monitor P/P/DP, repeat CXR daily, monitor transplumonary PEEP  Dexamethasone per COVID protocol    CARDIOVASCULAR: wean pressors, ECHO    GI: GI prophylaxis. feeding at goal, bowel regimen PRN    RENAL:  Follow up lytes.  Correct as needed. renal US. renal fup.  Maintain roughly net even.  Was net positive yesterday 1L, will give 1x Lasix dose today.    INFECTIOUS DISEASE: Follow up cultures. Meropenem for ESBL Proteus, ID eval.  Dexamethasone for COVID    HEMATOLOGICAL: patient has a fib not on ac, heparin gtt    ENDOCRINE:  Follow up FS.  Insulin protocol if needed.    MUSCULOSKELETAL: bedrest    MICU.  L IJ/ ya

## 2023-02-05 NOTE — PROGRESS NOTE ADULT - SUBJECTIVE AND OBJECTIVE BOX
Patient is a 72y old  Female who presents with a chief complaint of respiratory distress (04 Feb 2023 13:39)        Over Night Events:    no acute overnight events  on low-dose levophed      ROS:       Unable to assess ROS: patient intubated.        PHYSICAL EXAM    ICU Vital Signs Last 24 Hrs  T(C): 36.5 (05 Feb 2023 04:00), Max: 36.7 (05 Feb 2023 00:00)  T(F): 97.7 (05 Feb 2023 04:00), Max: 98 (05 Feb 2023 00:00)  HR: 55 (05 Feb 2023 08:15) (44 - 78)  BP: 100/53 (05 Feb 2023 08:15) (93/48 - 135/63)  BP(mean): 64 (05 Feb 2023 08:15) (50 - 90)  ABP: --  ABP(mean): --  RR: 26 (05 Feb 2023 08:15) (23 - 43)  SpO2: 99% (05 Feb 2023 08:15) (81% - 100%)    O2 Parameters below as of 05 Feb 2023 08:15  Patient On (Oxygen Delivery Method): ventilator    O2 Concentration (%): 70      Constitutional: no acute distress, well nourished well developed  Neuro: moving all 4 limbs spontaneously.  Sedated.  Follows commands  HEENT: NCAT, anicteric, ETT in place  Neck: no visible lymphadenopathy or goiter  Pulm: synchronous with ventilator, coarse bilateral breath sounds anteriorly  Cardiac: extremities appear pink and well-perfused.  regular rhythm and rate, no murmur detected  Abdomen: non-distended  Extremities: trace dependent edema  Skin: no visible rashes or lesions        02-04-23 @ 07:01  -  02-05-23 @ 07:00  --------------------------------------------------------  IN:    Dexmedetomidine: 326.7 mL    Enteral Tube Flush: 60 mL    FentaNYL: 454.5 mL    Heparin Infusion: 454 mL    IV PiggyBack: 150 mL    Norepinephrine: 254.1 mL    Peptamen A.F.: 1260 mL    Vasopressin: 12 mL  Total IN: 2971.3 mL    OUT:    Indwelling Catheter - Urethral (mL): 1950 mL  Total OUT: 1950 mL    Total NET: 1021.3 mL      02-05-23 @ 07:01  -  02-05-23 @ 09:02  --------------------------------------------------------  IN:    Heparin Infusion: 36 mL    IV PiggyBack: 100 mL    Norepinephrine: 26.8 mL    Peptamen A.F.: 120 mL  Total IN: 282.8 mL    OUT:    Dexmedetomidine: 0 mL    FentaNYL: 0 mL  Total OUT: 0 mL    Total NET: 282.8 mL          LABS:                            9.4    25.81 )-----------( 211      ( 05 Feb 2023 04:20 )             27.7                                               02-05    136  |  96<L>  |  78<HH>  ----------------------------<  134<H>  2.7<LL>   |  31  |  2.6<H>    Ca    10.2      05 Feb 2023 04:20  Mg     2.0     02-05    TPro  5.0<L>  /  Alb  2.8<L>  /  TBili  0.5  /  DBili  x   /  AST  7   /  ALT  14  /  AlkPhos  128<H>  02-05      PTT - ( 05 Feb 2023 04:20 )  PTT:67.6 sec                                                                                     LIVER FUNCTIONS - ( 05 Feb 2023 04:20 )  Alb: 2.8 g/dL / Pro: 5.0 g/dL / ALK PHOS: 128 U/L / ALT: 14 U/L / AST: 7 U/L / GGT: x                                                  Culture - Sputum (collected 03 Feb 2023 09:46)  Source: Trach Asp Tracheal Aspirate  Gram Stain (03 Feb 2023 23:04):    Few polymorphonuclear leukocytes per low power field    Rare Squamous epithelial cells per low power field    Rare Gram positive cocci in pairs per oil power field  Preliminary Report (04 Feb 2023 17:23):    No growth                                                   Mode: AC/ CMV (Assist Control/ Continuous Mandatory Ventilation)  RR (machine): 26  TV (machine): 370  FiO2: 70  PEEP: 16  ITime: 1  MAP: 23  PIP: 37                                      ABG - ( 05 Feb 2023 02:51 )  pH, Arterial: 7.39  pH, Blood: x     /  pCO2: 46    /  pO2: 88    / HCO3: 28    / Base Excess: 2.4   /  SaO2: 96.4                MEDICATIONS  (STANDING):  chlorhexidine 0.12% Liquid 15 milliLiter(s) Oral Mucosa every 12 hours  chlorhexidine 2% Cloths 1 Application(s) Topical daily  dexAMETHasone  Injectable 6 milliGRAM(s) IV Push daily  dexMEDEtomidine Infusion 0.2 MICROgram(s)/kG/Hr (5.5 mL/Hr) IV Continuous <Continuous>  fentaNYL   Infusion. 0.5 MICROgram(s)/kG/Hr (5.5 mL/Hr) IV Continuous <Continuous>  glucagon  Injectable 1 milliGRAM(s) IntraMuscular once  heparin  Infusion. 2400 Unit(s)/Hr (24 mL/Hr) IV Continuous <Continuous>  insulin lispro (ADMELOG) corrective regimen sliding scale   SubCutaneous every 6 hours  meropenem  IVPB 500 milliGRAM(s) IV Intermittent every 12 hours  norepinephrine Infusion 0.05 MICROgram(s)/kG/Min (5.16 mL/Hr) IV Continuous <Continuous>  pantoprazole  Injectable 40 milliGRAM(s) IV Push daily  polyethylene glycol 3350 17 Gram(s) Oral daily  senna 2 Tablet(s) Oral at bedtime  sodium bicarbonate  Injectable 100 milliEquivalent(s) IV Push once    MEDICATIONS  (PRN):  acetaminophen  Suppository .. 650 milliGRAM(s) Rectal every 4 hours PRN Temp greater or equal to 38.5C (101.3F)  heparin   Injectable 9000 Unit(s) IV Push every 6 hours PRN For aPTT less than 40  heparin   Injectable 4000 Unit(s) IV Push every 6 hours PRN For aPTT between 40 - 57      New X-rays reviewed:       ECHO reviewed    CXR interpreted by me:    2/5  images and radiologist read reviewed, by my read demonstrating stable minimal bilateral infiltrates

## 2023-02-06 DIAGNOSIS — U07.1 COVID-19: ICD-10-CM

## 2023-02-06 DIAGNOSIS — A41.9 SEPSIS, UNSPECIFIED ORGANISM: ICD-10-CM

## 2023-02-06 DIAGNOSIS — Z51.5 ENCOUNTER FOR PALLIATIVE CARE: ICD-10-CM

## 2023-02-06 DIAGNOSIS — N17.9 ACUTE KIDNEY FAILURE, UNSPECIFIED: ICD-10-CM

## 2023-02-06 DIAGNOSIS — J96.00 ACUTE RESPIRATORY FAILURE, UNSPECIFIED WHETHER WITH HYPOXIA OR HYPERCAPNIA: ICD-10-CM

## 2023-02-06 LAB
ALBUMIN SERPL ELPH-MCNC: 2.8 G/DL — LOW (ref 3.5–5.2)
ALP SERPL-CCNC: 133 U/L — HIGH (ref 30–115)
ALT FLD-CCNC: 18 U/L — SIGNIFICANT CHANGE UP (ref 0–41)
ANION GAP SERPL CALC-SCNC: 10 MMOL/L — SIGNIFICANT CHANGE UP (ref 7–14)
APTT BLD: 76 SEC — CRITICAL HIGH (ref 27–39.2)
APTT BLD: 87.4 SEC — CRITICAL HIGH (ref 27–39.2)
AST SERPL-CCNC: 12 U/L — SIGNIFICANT CHANGE UP (ref 0–41)
BASE EXCESS BLDA CALC-SCNC: 5.8 MMOL/L — HIGH (ref -2–3)
BILIRUB SERPL-MCNC: 0.4 MG/DL — SIGNIFICANT CHANGE UP (ref 0.2–1.2)
BUN SERPL-MCNC: 78 MG/DL — CRITICAL HIGH (ref 10–20)
CALCIUM SERPL-MCNC: 10.8 MG/DL — HIGH (ref 8.4–10.5)
CHLORIDE SERPL-SCNC: 101 MMOL/L — SIGNIFICANT CHANGE UP (ref 98–110)
CO2 SERPL-SCNC: 29 MMOL/L — SIGNIFICANT CHANGE UP (ref 17–32)
CREAT SERPL-MCNC: 2.1 MG/DL — HIGH (ref 0.7–1.5)
CRP SERPL-MCNC: 42.3 MG/L — HIGH
CULTURE RESULTS: SIGNIFICANT CHANGE UP
EGFR: 25 ML/MIN/1.73M2 — LOW
GAS PNL BLDA: SIGNIFICANT CHANGE UP
GLUCOSE BLDC GLUCOMTR-MCNC: 150 MG/DL — HIGH (ref 70–99)
GLUCOSE BLDC GLUCOMTR-MCNC: 168 MG/DL — HIGH (ref 70–99)
GLUCOSE BLDC GLUCOMTR-MCNC: 190 MG/DL — HIGH (ref 70–99)
GLUCOSE BLDC GLUCOMTR-MCNC: 211 MG/DL — HIGH (ref 70–99)
GLUCOSE SERPL-MCNC: 175 MG/DL — HIGH (ref 70–99)
HCO3 BLDA-SCNC: 31 MMOL/L — HIGH (ref 21–28)
HCT VFR BLD CALC: 27.8 % — LOW (ref 37–47)
HGB BLD-MCNC: 9.1 G/DL — LOW (ref 12–16)
INR BLD: 1.05 RATIO — SIGNIFICANT CHANGE UP (ref 0.65–1.3)
LDH SERPL L TO P-CCNC: 139 — SIGNIFICANT CHANGE UP (ref 50–242)
MAGNESIUM SERPL-MCNC: 1.9 MG/DL — SIGNIFICANT CHANGE UP (ref 1.8–2.4)
MCHC RBC-ENTMCNC: 28.3 PG — SIGNIFICANT CHANGE UP (ref 27–31)
MCHC RBC-ENTMCNC: 32.7 G/DL — SIGNIFICANT CHANGE UP (ref 32–37)
MCV RBC AUTO: 86.6 FL — SIGNIFICANT CHANGE UP (ref 81–99)
NRBC # BLD: 0 /100 WBCS — SIGNIFICANT CHANGE UP (ref 0–0)
PCO2 BLDA: 44 MMHG — SIGNIFICANT CHANGE UP (ref 25–48)
PH BLDA: 7.45 — SIGNIFICANT CHANGE UP (ref 7.35–7.45)
PLATELET # BLD AUTO: 175 K/UL — SIGNIFICANT CHANGE UP (ref 130–400)
PO2 BLDA: 102 MMHG — SIGNIFICANT CHANGE UP (ref 83–108)
POTASSIUM SERPL-MCNC: 3.7 MMOL/L — SIGNIFICANT CHANGE UP (ref 3.5–5)
POTASSIUM SERPL-SCNC: 3.7 MMOL/L — SIGNIFICANT CHANGE UP (ref 3.5–5)
PROT SERPL-MCNC: 5 G/DL — LOW (ref 6–8)
PROTHROM AB SERPL-ACNC: 12 SEC — SIGNIFICANT CHANGE UP (ref 9.95–12.87)
RBC # BLD: 3.21 M/UL — LOW (ref 4.2–5.4)
RBC # FLD: 13.3 % — SIGNIFICANT CHANGE UP (ref 11.5–14.5)
SAO2 % BLDA: 97.3 % — SIGNIFICANT CHANGE UP (ref 94–98)
SODIUM SERPL-SCNC: 140 MMOL/L — SIGNIFICANT CHANGE UP (ref 135–146)
SPECIMEN SOURCE: SIGNIFICANT CHANGE UP
T4 FREE SERPL-MCNC: 1.2 NG/DL — SIGNIFICANT CHANGE UP (ref 0.9–1.8)
TSH SERPL-MCNC: 0.15 UIU/ML — LOW (ref 0.27–4.2)
TSH SERPL-MCNC: 0.18 UIU/ML — LOW (ref 0.27–4.2)
WBC # BLD: 22.93 K/UL — HIGH (ref 4.8–10.8)
WBC # FLD AUTO: 22.93 K/UL — HIGH (ref 4.8–10.8)

## 2023-02-06 PROCEDURE — 99291 CRITICAL CARE FIRST HOUR: CPT

## 2023-02-06 PROCEDURE — 99223 1ST HOSP IP/OBS HIGH 75: CPT

## 2023-02-06 PROCEDURE — 93306 TTE W/DOPPLER COMPLETE: CPT | Mod: 26

## 2023-02-06 PROCEDURE — 71045 X-RAY EXAM CHEST 1 VIEW: CPT | Mod: 26,76

## 2023-02-06 RX ORDER — NOREPINEPHRINE BITARTRATE/D5W 8 MG/250ML
0.05 PLASTIC BAG, INJECTION (ML) INTRAVENOUS
Qty: 16 | Refills: 0 | Status: DISCONTINUED | OUTPATIENT
Start: 2023-02-06 | End: 2023-02-07

## 2023-02-06 RX ORDER — MEROPENEM 1 G/30ML
500 INJECTION INTRAVENOUS EVERY 24 HOURS
Refills: 0 | Status: DISCONTINUED | OUTPATIENT
Start: 2023-02-06 | End: 2023-02-07

## 2023-02-06 RX ORDER — PANTOPRAZOLE SODIUM 20 MG/1
40 TABLET, DELAYED RELEASE ORAL DAILY
Refills: 0 | Status: DISCONTINUED | OUTPATIENT
Start: 2023-02-07 | End: 2023-02-08

## 2023-02-06 RX ADMIN — Medication 2: at 00:44

## 2023-02-06 RX ADMIN — HEPARIN SODIUM 1800 UNIT(S)/HR: 5000 INJECTION INTRAVENOUS; SUBCUTANEOUS at 23:05

## 2023-02-06 RX ADMIN — MEROPENEM 100 MILLIGRAM(S): 1 INJECTION INTRAVENOUS at 06:31

## 2023-02-06 RX ADMIN — HEPARIN SODIUM 1800 UNIT(S)/HR: 5000 INJECTION INTRAVENOUS; SUBCUTANEOUS at 06:17

## 2023-02-06 RX ADMIN — Medication 4: at 10:26

## 2023-02-06 RX ADMIN — CHLORHEXIDINE GLUCONATE 15 MILLILITER(S): 213 SOLUTION TOPICAL at 06:04

## 2023-02-06 RX ADMIN — Medication 6 MILLIGRAM(S): at 06:04

## 2023-02-06 RX ADMIN — Medication 2: at 06:03

## 2023-02-06 RX ADMIN — CHLORHEXIDINE GLUCONATE 15 MILLILITER(S): 213 SOLUTION TOPICAL at 17:07

## 2023-02-06 RX ADMIN — FENTANYL CITRATE 5.5 MICROGRAM(S)/KG/HR: 50 INJECTION INTRAVENOUS at 17:23

## 2023-02-06 NOTE — PROGRESS NOTE ADULT - SUBJECTIVE AND OBJECTIVE BOX
Over Night Events: events noted, remain critically ill, still intubated, ventilated, on hep, levophed 0.04, fentanyl    PHYSICAL EXAM    ICU Vital Signs Last 24 Hrs  T(C): 36.6 (06 Feb 2023 08:00), Max: 36.9 (05 Feb 2023 16:00)  T(F): 97.9 (06 Feb 2023 08:00), Max: 98.4 (05 Feb 2023 16:00)  HR: 50 (06 Feb 2023 08:00) (40 - 92)  BP: 137/58 (06 Feb 2023 08:00) (81/43 - 160/60)  BP(mean): 92 (06 Feb 2023 08:00) (51 - 97)  RR: 23 (06 Feb 2023 08:00) (22 - 42)  SpO2: 100% (06 Feb 2023 08:00) (98% - 100%)    O2 Parameters below as of 06 Feb 2023 08:00  Patient On (Oxygen Delivery Method): ventilator    O2 Concentration (%): 50        General: ill looking  HEENT: ETT            Lungs: dec bs both bases  Cardiovascular: Regular   Abdomen: Soft, Positive BS  Extremities: No clubbing   Neurological: Non focal       02-05-23 @ 07:01  -  02-06-23 @ 07:00  --------------------------------------------------------  IN:    Dexmedetomidine: 310.5 mL    Enteral Tube Flush: 165 mL    FentaNYL: 484 mL    Heparin Infusion: 432 mL    IV PiggyBack: 250 mL    Norepinephrine: 148.2 mL    Peptamen A.F.: 1440 mL  Total IN: 3229.7 mL    OUT:    Indwelling Catheter - Urethral (mL): 4875 mL    Rectal Tube (mL): 400 mL  Total OUT: 5275 mL    Total NET: -2045.3 mL      02-06-23 @ 07:01  -  02-06-23 @ 08:25  --------------------------------------------------------  IN:    FentaNYL: 44 mL    Heparin Infusion: 36 mL    Norepinephrine: 8.2 mL    Peptamen A.F.: 120 mL  Total IN: 208.2 mL    OUT:    Dexmedetomidine: 0 mL    Indwelling Catheter - Urethral (mL): 250 mL    Rectal Tube (mL): 100 mL  Total OUT: 350 mL    Total NET: -141.8 mL          LABS:                          9.1    22.93 )-----------( 175      ( 06 Feb 2023 04:55 )             27.8                                               02-06    140  |  101  |  78<HH>  ----------------------------<  175<H>  3.7   |  29  |  2.1<H>    Ca    10.8<H>      06 Feb 2023 04:55  Mg     1.9     02-06    TPro  5.0<L>  /  Alb  2.8<L>  /  TBili  0.4  /  DBili  x   /  AST  12  /  ALT  18  /  AlkPhos  133<H>  02-06      PTT - ( 06 Feb 2023 04:55 )  PTT:76.0 sec                                                                                     LIVER FUNCTIONS - ( 06 Feb 2023 04:55 )  Alb: 2.8 g/dL / Pro: 5.0 g/dL / ALK PHOS: 133 U/L / ALT: 18 U/L / AST: 12 U/L / GGT: x                                                  Culture - Sputum (collected 03 Feb 2023 09:46)  Source: Trach Asp Tracheal Aspirate  Gram Stain (03 Feb 2023 23:04):    Few polymorphonuclear leukocytes per low power field    Rare Squamous epithelial cells per low power field    Rare Gram positive cocci in pairs per oil power field  Final Report (05 Feb 2023 15:11):    Normal Respiratory Samia present                                                   Mode: AC/ CMV (Assist Control/ Continuous Mandatory Ventilation)  RR (machine): 22  TV (machine): 370  FiO2: 50  PEEP: 16  PS: 16  ITime: 1  MAP: 24  PIP: 49                                      ABG - ( 06 Feb 2023 03:11 )  pH, Arterial: 7.36  pH, Blood: x     /  pCO2: 52    /  pO2: 109   / HCO3: 29    / Base Excess: 3.2   /  SaO2: 97.2        plateau noted        MEDICATIONS  (STANDING):  chlorhexidine 0.12% Liquid 15 milliLiter(s) Oral Mucosa every 12 hours  chlorhexidine 2% Cloths 1 Application(s) Topical daily  dexAMETHasone  Injectable 6 milliGRAM(s) IV Push daily  dexMEDEtomidine Infusion 0.2 MICROgram(s)/kG/Hr (5.5 mL/Hr) IV Continuous <Continuous>  fentaNYL   Infusion. 0.5 MICROgram(s)/kG/Hr (5.5 mL/Hr) IV Continuous <Continuous>  glucagon  Injectable 1 milliGRAM(s) IntraMuscular once  heparin  Infusion. 2400 Unit(s)/Hr (24 mL/Hr) IV Continuous <Continuous>  insulin lispro (ADMELOG) corrective regimen sliding scale   SubCutaneous every 6 hours  meropenem  IVPB 500 milliGRAM(s) IV Intermittent every 12 hours  norepinephrine Infusion 0.05 MICROgram(s)/kG/Min (5.16 mL/Hr) IV Continuous <Continuous>  pantoprazole  Injectable 40 milliGRAM(s) IV Push daily  polyethylene glycol 3350 17 Gram(s) Oral daily  senna 2 Tablet(s) Oral at bedtime  sodium bicarbonate  Injectable 100 milliEquivalent(s) IV Push once    MEDICATIONS  (PRN):  acetaminophen  Suppository .. 650 milliGRAM(s) Rectal every 4 hours PRN Temp greater or equal to 38.5C (101.3F)  heparin   Injectable 9000 Unit(s) IV Push every 6 hours PRN For aPTT less than 40  heparin   Injectable 4000 Unit(s) IV Push every 6 hours PRN For aPTT between 40 - 57      CXR reviewed

## 2023-02-06 NOTE — CONSULT NOTE ADULT - ASSESSMENT
72yFemale being evaluated for      MEDD (morphine equivalent daily dose):      See Recs below.    Please call [phone] with questions or concerns 24/7.   We will continue to follow.     Discussed with primary MD.   73 yo female with afib, htn, solitary kidney presented from [facility] for SOB. As per the family, the patient tested positive for covid 4 days ago and she has been feeling unwell. She was also SOB but she refused to visit the hospital. Today she was altered and her oxygen dropped to 70s in the NH, in addition to a drop in her bp. She presented to the ed and was placed on bipap with no improvement so she was intubated. She tested positive for covid, and her ct scan showed mild right pleural effusion. Admitted to MICU with AHRF, septic shock.   Currently patient is intubated, ventilated, on Levophed, sedated and on heparin infusion. Palliative consulted for high LACE score.     Will follow for GOC.   Patient asymptomatic on exam.     See Recs below.    Please call [phone] with questions or concerns 24/7.   We will continue to follow.     Discussed with primary MD.

## 2023-02-06 NOTE — CHART NOTE - NSCHARTNOTEFT_GEN_A_CORE
NUTRITION SUPPORT TEAM  -  PROGRESS NOTE     Interval Events:        REVIEW OF SYSTEMS:  Negative except as noted above.     VITALS:  T(F): 98.8 (02-06 @ 12:00), Max: 98.8 (02-06 @ 12:00)  HR: 58 (02-06 @ 13:00) (40 - 86)  BP: 120/47 (02-06 @ 13:00) (104/51 - 160/60)  RR: 26 (02-06 @ 13:00) (22 - 42)  SpO2: 99% (02-06 @ 13:00) (97% - 100%)    HEIGHT/WEIGHT/BMI:   Height (cm): 165.1 (02-01)  Weight (kg): 110 (01-31)  BMI (kg/m2): 40.4 (02-01)    I/Os:     02-05-23 @ 07:01  -  02-06-23 @ 07:00  --------------------------------------------------------  IN:    Dexmedetomidine: 310.5 mL    Enteral Tube Flush: 165 mL    FentaNYL: 484 mL    Heparin Infusion: 432 mL    IV PiggyBack: 250 mL    Norepinephrine: 148.2 mL    Peptamen A.F.: 1440 mL  Total IN: 3229.7 mL    OUT:    Indwelling Catheter - Urethral (mL): 4875 mL    Rectal Tube (mL): 400 mL  Total OUT: 5275 mL    Total NET: -2045.3 mL      MEDICATIONS:   acetaminophen  Suppository .. 650 milliGRAM(s) Rectal every 4 hours PRN  chlorhexidine 0.12% Liquid 15 milliLiter(s) Oral Mucosa every 12 hours  chlorhexidine 2% Cloths 1 Application(s) Topical daily  dexAMETHasone  Injectable 6 milliGRAM(s) IV Push daily  dexMEDEtomidine Infusion 0.2 MICROgram(s)/kG/Hr IV Continuous <Continuous>  fentaNYL   Infusion. 0.5 MICROgram(s)/kG/Hr IV Continuous <Continuous>  glucagon  Injectable 1 milliGRAM(s) IntraMuscular once  heparin   Injectable 9000 Unit(s) IV Push every 6 hours PRN  heparin   Injectable 4000 Unit(s) IV Push every 6 hours PRN  heparin  Infusion. 2400 Unit(s)/Hr IV Continuous <Continuous>  insulin lispro (ADMELOG) corrective regimen sliding scale   SubCutaneous every 6 hours  meropenem  IVPB 500 milliGRAM(s) IV Intermittent every 12 hours  norepinephrine Infusion 0.05 MICROgram(s)/kG/Min IV Continuous <Continuous>  polyethylene glycol 3350 17 Gram(s) Oral daily  senna 2 Tablet(s) Oral at bedtime  sodium bicarbonate  Injectable 100 milliEquivalent(s) IV Push once      LABS:                         9.1    22.93 )-----------( 175      ( 06 Feb 2023 04:55 )             27.8     140  |  101  |  78<HH>  ----------------------------<  175<H>          (02-06-23 @ 04:55)  3.7   |  29  |  2.1<H>    Ca    10.8<H>          (02-06-23 @ 04:55)  Mg     1.9         (02-06-23 @ 04:55)    TPro  5.0<L>  /  Alb  2.8<L>  /  TBili  0.4  /  DBili  x   /  AST  12  /  ALT  18  /  AlkPhos  133<H>       02-06-23 @ 04:55    Vitamin D, 25-Hydroxy: <6 ng/mL (02-04 @ 20:22)    Blood Glucose (Past 24 hours):  211 mg/dL (02-06 @ 10:18)  168 mg/dL (02-06 @ 05:53)  190 mg/dL (02-06 @ 00:05)  182 mg/dL (02-05 @ 18:37)      DIET:   Diet, NPO with Tube Feed:   Tube Feeding Modality: Nasogastric  Peptamen A.F. Formula  Total Volume for 24 Hours (mL): 1440  Continuous  Starting Tube Feed Rate {mL per Hour}: 25  Increase Tube Feed Rate by (mL): 10     Every 4 hours  Until Goal Tube Feed Rate (mL per Hour): 60  Tube Feed Duration (in Hours): 24  Tube Feed Start Time: 10:00 (02-02-23 @ 10:04) [Active]    ASSESSMENT  - covid +  - acute hypoxic resp failure/ ARDS  - morbid obesity  - DM with hyperglycemia  - NOLA  - shock on pressors      SUGGEST:  - cont Peptamen AF, goal feeds 45ml/hr and add Beneprotein 6 packs/day  - glycemic control   - monitor renal fx, lytes  - monitor BM's (on miralax and senna)  - will follow

## 2023-02-06 NOTE — PROGRESS NOTE ADULT - ASSESSMENT
NOLA / last creat 0.9 on 1/3/23 per HIE / hx of solitary kidney   Likely ATN (hypotension, COVID19/ARDS) / CRS / heavy NSAID use  Hyponatremia improving with iv hydration  HAGMA d/t renal failure / r/o lactic acidosis/DKA  Acute respiratory failure / pulmonary edema / COVID19 / ARDS   Septic shock on pressors   - non oliguric   - creat trending down    - on MV  - strict i/o   - corrected calcium around 11 / PTH mediated, will need w/u once xtubated   - ph noted, repeat  - no need for RRT .  - dose meds to GFR of 30 ml/min  - hypertrophic right kidney on CT / 2/4 renal ultrasound reviewed, left kidney not visualized, right kidney large w/o hydronephrosis  - will follow

## 2023-02-06 NOTE — PROGRESS NOTE ADULT - ASSESSMENT
71 yo female with afib, htn, solitary kidney presented from [facility] for SOB. As per the family, the patient tested positive for covid 4 days ago and she has been feeling unwell. She was also SOB but she refused to visit the hospital. Today she was altered and her oxygen dropped to 70s in the NH, in addition to a drop in her bp. She presented to the ed and was placed on bipap with no improvement so she was intubated. She tested positive for covid, and her ct scan showed mild right pleural effusion. Admitted to MICU with AHRF, septic shock.   Currently patient is intubated, ventilated, on Levophed, sedated and on heparin infusion.      PLAN    #Acute hypoxemic resp failure still intubated on 50%/ 16  #ARDS  #Sepsis POA  #Possible CAP  -       #Septic shock  #ESBL UTI  #G+C bacteremia/ COAG - STAP  #COVID +      #NOLA  likely ATN vs prerenal imroving  #H/O congenital unilateral kidney    #H/O A FIb not  on AC? 71 yo female with afib, htn, solitary kidney presented from [facility] for SOB. As per the family, the patient tested positive for covid 4 days ago and she has been feeling unwell. She was also SOB but she refused to visit the hospital. Today she was altered and her oxygen dropped to 70s in the NH, in addition to a drop in her bp. She presented to the ed and was placed on bipap with no improvement so she was intubated. She tested positive for covid, and her ct scan showed mild right pleural effusion. Admitted to MICU with AHRF, septic shock.   Currently patient is intubated, ventilated, on Levophed, sedated and on heparin infusion.      PLAN    #Acute hypoxemic resp failure still intubated on 50%/ 16  #ARDS  #Sepsis POA  #Possible CAP  #COVID +  - MRSA (-), Strep (-), Legionella (-) on 2/1   - Covid + on 1/31/22  - CT Chest No Cont (01.31.23 @ 21:17):  Mild right pleural effusion with adjacent  atelectasis. Bibasilar atelectatic changes versus scarring. Partial   obstruction of the distal right lower lobe bronchioles. Otherwise no  evidence of central endobronchial obstruction. No focal consolidation.  Right upper lobe pulmonary cyst (4-130). No pleural effusion. No  pneumothorax.  - Xray Chest 1 View-PORTABLE IMMEDIATE (Xray Chest 1 View-PORTABLE IMMEDIATE .) (02.01.23 @ 06:31):  Worsening opacifications and effusions. Support devices as described.  - No RDV given as patient was out of the window and had NOLA earlier in the hospital course   - c/w dexamethasone 6 mg q24h ( 2/1 -2/2, 2/4- current) for 10 days   - No TOCI given as patient had positive blood cultures   - D-Dimer 818, Ferritin 347, LDH (203 --> 139), CRP (107.1 --> 42), procal ( 14.3 -->37.2 --> 49.6)   - decrease peep today 2/6   - daily CXR  - daily ABG       #Septic shock  #ESBL UTI  #G+C bacteremia/ COAG - STAP  - UA 1/31 showed LE+, WBC+, Bacteria+, Epithelial cells 27+  - UCx: proteus mirabilis ESBL   - BCx: staph epidermidis mehicillin resistant ( most likely contaminanant as per ID)   - s/p Levaquin ( 1/31 - 2/3), Rocephin ( 2/1-2/3),   - c/w on Meropenem 500 mg q24h ( 2/3 - current)   - ID following     #NOLA  likely ATN vs prerenal imroving  #H/O congenital unilateral kidney  #Hypercalcemia   - Renal US 2/4/23: No right hydronephrosis; left kidney congenitally absent   - Creat trending down ( 5.1 --> 2.1)   - strict i/o  - Corrected Ca around 11/ PTH mediated; needs hypercalcemia workup once extubated   - no need for RRT at this time   - Renally dose medications   - nephrology following     #H/O A FIb not  on AC?  - rate controlled   - currently on a heparin drip for anticoagulation   - monitor pTT     #Low TSH   - TSH: 0.15  71 yo female with afib, htn, solitary kidney presented from [facility] for SOB. As per the family, the patient tested positive for covid 4 days ago and she has been feeling unwell. She was also SOB but she refused to visit the hospital. Today she was altered and her oxygen dropped to 70s in the NH, in addition to a drop in her bp. She presented to the ed and was placed on bipap with no improvement so she was intubated. She tested positive for covid, and her ct scan showed mild right pleural effusion. Admitted to MICU with AHRF, septic shock.   Currently patient is intubated, ventilated, on Levophed, sedated and on heparin infusion.      PLAN    #Acute hypoxemic resp failure still intubated on 50%/ 16  #ARDS  #Sepsis POA  #Possible CAP  #COVID +  - MRSA (-), Strep (-), Legionella (-) on 2/1   - Covid + on 1/31/22  - CT Chest No Cont (01.31.23 @ 21:17):  Mild right pleural effusion with adjacent  atelectasis. Bibasilar atelectatic changes versus scarring. Partial   obstruction of the distal right lower lobe bronchioles. Otherwise no  evidence of central endobronchial obstruction. No focal consolidation.  Right upper lobe pulmonary cyst (4-130). No pleural effusion. No  pneumothorax.  - Xray Chest 1 View-PORTABLE IMMEDIATE (Xray Chest 1 View-PORTABLE IMMEDIATE .) (02.01.23 @ 06:31):  Worsening opacifications and effusions. Support devices as described.  - No RDV given as patient was out of the window and had NOLA earlier in the hospital course   - c/w dexamethasone 6 mg q24h ( 2/1 -2/2, 2/4- current) for 10 days   - No TOCI given as patient had positive blood cultures   - D-Dimer 818, Ferritin 347, LDH (203 --> 139), CRP (107.1 --> 42), procal ( 14.3 -->37.2 --> 49.6)   - decrease peep today 2/6   - daily CXR  - daily ABG       #Septic shock  #ESBL UTI  #G+C bacteremia/ COAG - STAP  - UA 1/31 showed LE+, WBC+, Bacteria+, Epithelial cells 27+  - UCx: proteus mirabilis ESBL   - BCx: staph epidermidis mehicillin resistant ( most likely contaminanant as per ID)   - s/p Levaquin ( 1/31 - 2/3), Rocephin ( 2/1-2/3),   - c/w on Meropenem 500 mg q24h ( 2/3 - current)   - ID following     #NOLA  likely ATN vs prerenal imroving  #H/O congenital unilateral kidney  #Hypercalcemia   - Renal US 2/4/23: No right hydronephrosis; left kidney congenitally absent   - Creat trending down ( 5.1 --> 2.1)   - strict i/o  - Corrected Ca around 11/ PTH mediated; needs hypercalcemia workup once extubated   - no need for RRT at this time   - Renally dose medications   - nephrology following     #H/O A FIb not  on AC?  - rate controlled   - currently on a heparin drip for anticoagulation   - monitor pTT     #Low TSH   - TSH: 0.15   - f/u total T3 and free T4     # 71 yo female with afib, htn, solitary kidney presented from [facility] for SOB. As per the family, the patient tested positive for covid 4 days ago and she has been feeling unwell. She was also SOB but she refused to visit the hospital. Today she was altered and her oxygen dropped to 70s in the NH, in addition to a drop in her bp. She presented to the ed and was placed on bipap with no improvement so she was intubated. She tested positive for covid, and her ct scan showed mild right pleural effusion. Admitted to MICU with AHRF, septic shock.   Currently patient is intubated, ventilated, on Levophed, sedated and on heparin infusion.      PLAN    #Acute hypoxemic resp failure still intubated on 50%/ 16  #ARDS  #Sepsis POA  #Possible CAP  #COVID +  - MRSA (-), Strep (-), Legionella (-) on 2/1   - Covid + on 1/31/22  - CT Chest No Cont (01.31.23 @ 21:17):  Mild right pleural effusion with adjacent  atelectasis. Bibasilar atelectatic changes versus scarring. Partial   obstruction of the distal right lower lobe bronchioles. Otherwise no  evidence of central endobronchial obstruction. No focal consolidation.  Right upper lobe pulmonary cyst (4-130). No pleural effusion. No  pneumothorax.  - Xray Chest 1 View-PORTABLE IMMEDIATE (Xray Chest 1 View-PORTABLE IMMEDIATE .) (02.01.23 @ 06:31):  Worsening opacifications and effusions. Support devices as described.  - No RDV given as patient was out of the window and had [place]OL[place] earlier in the hospital course   - c/w dexamethasone 6 mg q24h ( 2/1 -2/2, 2/4- current) for 10 days   - No TOCI given as patient had positive blood cultures   - D-Dimer 818, Ferritin 347, LDH (203 --> 139), CRP (107.1 --> 42), procal ( 14.3 -->37.2 --> 49.6)   - decrease peep today 2/6   - daily CXR  - daily ABG       #Septic shock  #ESBL UTI  #G+C bacteremia/ COAG - STAP  - UA 1/31 showed LE+, WBC+, Bacteria+, Epithelial cells 27+  - UCx: proteus mirabilis ESBL   - BCx: staph epidermidis mehicillin resistant ( most likely contaminanant as per ID)   - s/p Levaquin ( 1/31 - 2/3), Rocephin ( 2/1-2/3),   - c/w on Meropenem 500 mg q24h ( 2/3 - current)   - ID following     #NOLA  likely ATN vs prerenal imroving  #H/O congenital unilateral kidney  #Hypercalcemia   - Renal US 2/4/23: No right hydronephrosis; left kidney congenitally absent   - Creat trending down ( 5.1 --> 2.1)   - strict i/o  - Corrected Ca around 11/ PTH mediated; needs hypercalcemia workup once extubated   - no need for RRT at this time   - Renally dose medications   - nephrology following     #H/O A FIb not  on AC?  - rate controlled   - currently on a heparin drip for anticoagulation   - monitor pTT     #Low TSH   - TSH: 0.15   - f/u total T3 and free T4     #DVT PPx: Heparin gtt  #GI PPx: Pantoprazole   #Activity: IAT   #Diet: NGT feeds  #Code: Full  #Dispo: Acute

## 2023-02-06 NOTE — PROGRESS NOTE ADULT - SUBJECTIVE AND OBJECTIVE BOX
PATRICIA SPRAGUE 72y Female  MRN#: [id number]   CODE STATUS:_Full_______    Hospital Day: 6d    Patient is currently admitted with the primary diagnosis of AHRF secondary to Covid-19.     SUBJECTIVE:    73 yo female kth afib, htn, solitary kidney presented from [facility] for SOB.  As per the family, the patient tested positive for covid 4 days ago and she has been feeling unwell. She was also SOB but she refused to visit the hospital. Today she was altered and her oxygen dropped to 70s in the NH, in addition to a drop in her bp. She presented to the ed and was placed on bipap with no improvement so she was intubated. She tested positive for covid, and her ct scan showed mild right pleural effusion. Admitted to MICU for further management.     This morning patient remains intubated, and ventilated. Patient remains on levophed, heparin, and fentanyl drip. No overnight events were noted.         OBJECTIVE:  PAST MEDICAL & SURGICAL HISTORY:  HTN (hypertension)    Diabetes mellitus    Obesity    Gastroesophageal reflux disease    Active asthma    Generalized OA    Afib    H/O hernia repair  2011 and revised in 2013    History of cholecystectomy    ALLERGIES:  No Known Allergies    HOME MEDICATIONS:  Home Medications:  alendronate 70 mg oral tablet: 1 tab(s) orally once a week (01 Feb 2023 00:45)  carvedilol 12.5 mg oral tablet: 1 tab(s) orally 2 times a day (01 Feb 2023 00:48)  cyanocobalamin 1000 mcg oral tablet, extended release: 1 tab(s) orally once a day (01 Feb 2023 00:46)  labetalol 100 mg oral tablet: 1 tab(s) orally 2 times a day (06 Nov 2020 06:59)  losartan-hydrochlorothiazide 50 mg-12.5 mg oral tablet: 1 tab(s) orally once a day (01 Feb 2023 00:46)  NIFEdipine 90 mg oral tablet, extended release: 1 tab(s) orally once a day (01 Feb 2023 00:46)  oxycodone-acetaminophen 5 mg-325 mg oral tablet: 1 tab(s) orally every 6 hours, As Needed - 6) for severe pain  (06 Nov 2020 06:59)  potassium chloride 10 mEq oral capsule, extended release: 2 cap(s) orally once a day (06 Nov 2020 06:59)  rivaroxaban 20 mg oral tablet: 1 tab(s) orally once a day (before a meal) (06 Nov 2020 06:59)  sodium bicarbonate 650 mg oral tablet: 1 tab(s) orally once a day (01 Feb 2023 00:47)                           MEDICATIONS:  STANDING MEDICATIONS  chlorhexidine 0.12% Liquid 15 milliLiter(s) Oral Mucosa every 12 hours  chlorhexidine 2% Cloths 1 Application(s) Topical daily  dexAMETHasone  Injectable 6 milliGRAM(s) IV Push daily  dexMEDEtomidine Infusion 0.2 MICROgram(s)/kG/Hr IV Continuous <Continuous>  fentaNYL   Infusion. 0.5 MICROgram(s)/kG/Hr IV Continuous <Continuous>  glucagon  Injectable 1 milliGRAM(s) IntraMuscular once  heparin  Infusion. 2400 Unit(s)/Hr IV Continuous <Continuous>  insulin lispro (ADMELOG) corrective regimen sliding scale   SubCutaneous every 6 hours  meropenem  IVPB 500 milliGRAM(s) IV Intermittent every 12 hours  norepinephrine Infusion 0.05 MICROgram(s)/kG/Min IV Continuous <Continuous>  polyethylene glycol 3350 17 Gram(s) Oral daily  senna 2 Tablet(s) Oral at bedtime  sodium bicarbonate  Injectable 100 milliEquivalent(s) IV Push once    PRN MEDICATIONS  acetaminophen  Suppository .. 650 milliGRAM(s) Rectal every 4 hours PRN  heparin   Injectable 9000 Unit(s) IV Push every 6 hours PRN  heparin   Injectable 4000 Unit(s) IV Push every 6 hours PRN      VITAL SIGNS: Last 24 Hours  T(C): 37.1 (06 Feb 2023 12:00), Max: 37.1 (06 Feb 2023 12:00)  T(F): 98.8 (06 Feb 2023 12:00), Max: 98.8 (06 Feb 2023 12:00)  HR: 50 (06 Feb 2023 15:00) (40 - 92)  BP: 128/54 (06 Feb 2023 15:00) (81/43 - 160/60)  BP(mean): 84 (06 Feb 2023 15:00) (51 - 124)  RR: 23 (06 Feb 2023 15:00) (22 - 42)  SpO2: 99% (06 Feb 2023 15:00) (97% - 100%)      02-05-23 @ 07:01  -  02-06-23 @ 07:00  --------------------------------------------------------  IN: 3229.7 mL / OUT: 5275 mL / NET: -2045.3 mL    02-06-23 @ 07:01  -  02-06-23 @ 15:39  --------------------------------------------------------  IN: 935.7 mL / OUT: 1950 mL / NET: -1014.3 mL      LABS:                        9.1    22.93 )-----------( 175      ( 06 Feb 2023 04:55 )             27.8     02-06    140  |  101  |  78<HH>  ----------------------------<  175<H>  3.7   |  29  |  2.1<H>    Ca    10.8<H>      06 Feb 2023 04:55  Mg     1.9     02-06    TPro  5.0<L>  /  Alb  2.8<L>  /  TBili  0.4  /  DBili  x   /  AST  12  /  ALT  18  /  AlkPhos  133<H>  02-06    PT/INR - ( 06 Feb 2023 10:43 )   PT: 12.00 sec;   INR: 1.05 ratio         PTT - ( 06 Feb 2023 10:43 )  PTT:87.4 sec    ABG - ( 06 Feb 2023 15:05 )  pH, Arterial: 7.45  pH, Blood: x     /  pCO2: 44    /  pO2: 102   / HCO3: 31    / Base Excess: 5.8   /  SaO2: 97.3      RADIOLOGY:    Impression:    ET tube tip 5.7 cm above tor.    Stable bilateral basilar opacities    --- End of Report ---            PHYSICAL EXAM:  General: Resting comfortably in no acute painful distress  HEENT: Normocephalic, atraumatic  LUNGS: Clear to auscultation bilaterally, no wheezes, rales, rhonchi  HEART: S1S2 present, regular rate and rhythm, no murmurs, rubs, gallops  ABDOMEN: Soft, nontender, nondistended  EXT: No edema                                          PATRICIA SPRAGUE 72y Female  MRN#: [id number]   CODE STATUS:_Full_______    Hospital Day: 6d    Patient is currently admitted with the primary diagnosis of AHRF secondary to Covid-19.     SUBJECTIVE:    71 yo female kth afib, htn, solitary kidney presented from [facility] for SOB.  As per the family, the patient tested positive for covid 4 days ago and she has been feeling unwell. She was also SOB but she refused to visit the hospital. Today she was altered and her oxygen dropped to 70s in the NH, in addition to a drop in her bp. She presented to the ed and was placed on bipap with no improvement so she was intubated. She tested positive for covid, and her ct scan showed mild right pleural effusion. Admitted to MICU for further management.     This morning patient remains intubated, and ventilated. Patient remains on levophed, heparin, and fentanyl drip. No overnight events were noted.         OBJECTIVE:  PAST MEDICAL & SURGICAL HISTORY:  HTN (hypertension)    Diabetes mellitus    Obesity    Gastroesophageal reflux disease    Active asthma    Generalized OA    Afib    H/O hernia repair  2011 and revised in 2013    History of cholecystectomy    ALLERGIES:  No Known Allergies    HOME MEDICATIONS:  Home Medications:  alendronate 70 mg oral tablet: 1 tab(s) orally once a week (01 Feb 2023 00:45)  carvedilol 12.5 mg oral tablet: 1 tab(s) orally 2 times a day (01 Feb 2023 00:48)  cyanocobalamin 1000 mcg oral tablet, extended release: 1 tab(s) orally once a day (01 Feb 2023 00:46)  labetalol 100 mg oral tablet: 1 tab(s) orally 2 times a day (06 Nov 2020 06:59)  losartan-hydrochlorothiazide 50 mg-12.5 mg oral tablet: 1 tab(s) orally once a day (01 Feb 2023 00:46)  NIFEdipine 90 mg oral tablet, extended release: 1 tab(s) orally once a day (01 Feb 2023 00:46)  oxycodone-acetaminophen 5 mg-325 mg oral tablet: 1 tab(s) orally every 6 hours, As Needed - 6) for severe pain  (06 Nov 2020 06:59)  potassium chloride 10 mEq oral capsule, extended release: 2 cap(s) orally once a day (06 Nov 2020 06:59)  rivaroxaban 20 mg oral tablet: 1 tab(s) orally once a day (before a meal) (06 Nov 2020 06:59)  sodium bicarbonate 650 mg oral tablet: 1 tab(s) orally once a day (01 Feb 2023 00:47)                           MEDICATIONS:  STANDING MEDICATIONS  chlorhexidine 0.12% Liquid 15 milliLiter(s) Oral Mucosa every 12 hours  chlorhexidine 2% Cloths 1 Application(s) Topical daily  dexAMETHasone  Injectable 6 milliGRAM(s) IV Push daily  dexMEDEtomidine Infusion 0.2 MICROgram(s)/kG/Hr IV Continuous <Continuous>  fentaNYL   Infusion. 0.5 MICROgram(s)/kG/Hr IV Continuous <Continuous>  glucagon  Injectable 1 milliGRAM(s) IntraMuscular once  heparin  Infusion. 2400 Unit(s)/Hr IV Continuous <Continuous>  insulin lispro (ADMELOG) corrective regimen sliding scale   SubCutaneous every 6 hours  meropenem  IVPB 500 milliGRAM(s) IV Intermittent every 12 hours  norepinephrine Infusion 0.05 MICROgram(s)/kG/Min IV Continuous <Continuous>  polyethylene glycol 3350 17 Gram(s) Oral daily  senna 2 Tablet(s) Oral at bedtime  sodium bicarbonate  Injectable 100 milliEquivalent(s) IV Push once    PRN MEDICATIONS  acetaminophen  Suppository .. 650 milliGRAM(s) Rectal every 4 hours PRN  heparin   Injectable 9000 Unit(s) IV Push every 6 hours PRN  heparin   Injectable 4000 Unit(s) IV Push every 6 hours PRN      VITAL SIGNS: Last 24 Hours  T(C): 37.1 (06 Feb 2023 12:00), Max: 37.1 (06 Feb 2023 12:00)  T(F): 98.8 (06 Feb 2023 12:00), Max: 98.8 (06 Feb 2023 12:00)  HR: 50 (06 Feb 2023 15:00) (40 - 92)  BP: 128/54 (06 Feb 2023 15:00) (81/43 - 160/60)  BP(mean): 84 (06 Feb 2023 15:00) (51 - 124)  RR: 23 (06 Feb 2023 15:00) (22 - 42)  SpO2: 99% (06 Feb 2023 15:00) (97% - 100%)      02-05-23 @ 07:01  -  02-06-23 @ 07:00  --------------------------------------------------------  IN: 3229.7 mL / OUT: 5275 mL / NET: -2045.3 mL    02-06-23 @ 07:01  -  02-06-23 @ 15:39  --------------------------------------------------------  IN: 935.7 mL / OUT: 1950 mL / NET: -1014.3 mL      LABS:                        9.1    22.93 )-----------( 175      ( 06 Feb 2023 04:55 )             27.8     02-06    140  |  101  |  78<HH>  ----------------------------<  175<H>  3.7   |  29  |  2.1<H>    Ca    10.8<H>      06 Feb 2023 04:55  Mg     1.9     02-06    TPro  5.0<L>  /  Alb  2.8<L>  /  TBili  0.4  /  DBili  x   /  AST  12  /  ALT  18  /  AlkPhos  133<H>  02-06    PT/INR - ( 06 Feb 2023 10:43 )   PT: 12.00 sec;   INR: 1.05 ratio         PTT - ( 06 Feb 2023 10:43 )  PTT:87.4 sec    ABG - ( 06 Feb 2023 15:05 )  pH, Arterial: 7.45  pH, Blood: x     /  pCO2: 44    /  pO2: 102   / HCO3: 31    / Base Excess: 5.8   /  SaO2: 97.3      RADIOLOGY:    Impression:    ET tube tip 5.7 cm above tor.    Stable bilateral basilar opacities    --- End of Report ---      PHYSICAL EXAM:  General: ill-looking  HEENT: Normocephalic, atraumatic, intubated   LUNGS: decrease breath sounds bilateral lungs   HEART: S1S2 present, regular rate and rhythm  ABDOMEN: Soft, nontender, nondistended  EXT: No edema                                           PATRICIA SPRAGUE 72y Female  MRN#: [id number]   CODE STATUS:_Full_______    Hospital Day: 6d    Patient is currently admitted with the primary diagnosis of AHRF secondary to Covid-19.     SUBJECTIVE:    73 yo female pmhx of afib, htn, solitary kidney presented from [facility] for SOB.  As per the family, the patient tested positive for covid 4 days ago and she has been feeling unwell. She was also SOB but she refused to visit the hospital. Today she was altered and her oxygen dropped to 70s in the NH, in addition to a drop in her bp. She presented to the ed and was placed on bipap with no improvement so she was intubated. She tested positive for covid, and her ct scan showed mild right pleural effusion. Admitted to MICU for further management.     This morning patient remains intubated, and ventilated. Patient remains on levophed, heparin, and fentanyl drip. No overnight events were noted.         OBJECTIVE:  PAST MEDICAL & SURGICAL HISTORY:  HTN (hypertension)    Diabetes mellitus    Obesity    Gastroesophageal reflux disease    Active asthma    Generalized OA    Afib    H/O hernia repair  2011 and revised in 2013    History of cholecystectomy    ALLERGIES:  No Known Allergies    HOME MEDICATIONS:  Home Medications:  alendronate 70 mg oral tablet: 1 tab(s) orally once a week (01 Feb 2023 00:45)  carvedilol 12.5 mg oral tablet: 1 tab(s) orally 2 times a day (01 Feb 2023 00:48)  cyanocobalamin 1000 mcg oral tablet, extended release: 1 tab(s) orally once a day (01 Feb 2023 00:46)  labetalol 100 mg oral tablet: 1 tab(s) orally 2 times a day (06 Nov 2020 06:59)  losartan-hydrochlorothiazide 50 mg-12.5 mg oral tablet: 1 tab(s) orally once a day (01 Feb 2023 00:46)  NIFEdipine 90 mg oral tablet, extended release: 1 tab(s) orally once a day (01 Feb 2023 00:46)  oxycodone-acetaminophen 5 mg-325 mg oral tablet: 1 tab(s) orally every 6 hours, As Needed - 6) for severe pain  (06 Nov 2020 06:59)  potassium chloride 10 mEq oral capsule, extended release: 2 cap(s) orally once a day (06 Nov 2020 06:59)  rivaroxaban 20 mg oral tablet: 1 tab(s) orally once a day (before a meal) (06 Nov 2020 06:59)  sodium bicarbonate 650 mg oral tablet: 1 tab(s) orally once a day (01 Feb 2023 00:47)                           MEDICATIONS:  STANDING MEDICATIONS  chlorhexidine 0.12% Liquid 15 milliLiter(s) Oral Mucosa every 12 hours  chlorhexidine 2% Cloths 1 Application(s) Topical daily  dexAMETHasone  Injectable 6 milliGRAM(s) IV Push daily  dexMEDEtomidine Infusion 0.2 MICROgram(s)/kG/Hr IV Continuous <Continuous>  fentaNYL   Infusion. 0.5 MICROgram(s)/kG/Hr IV Continuous <Continuous>  glucagon  Injectable 1 milliGRAM(s) IntraMuscular once  heparin  Infusion. 2400 Unit(s)/Hr IV Continuous <Continuous>  insulin lispro (ADMELOG) corrective regimen sliding scale   SubCutaneous every 6 hours  meropenem  IVPB 500 milliGRAM(s) IV Intermittent every 12 hours  norepinephrine Infusion 0.05 MICROgram(s)/kG/Min IV Continuous <Continuous>  polyethylene glycol 3350 17 Gram(s) Oral daily  senna 2 Tablet(s) Oral at bedtime  sodium bicarbonate  Injectable 100 milliEquivalent(s) IV Push once    PRN MEDICATIONS  acetaminophen  Suppository .. 650 milliGRAM(s) Rectal every 4 hours PRN  heparin   Injectable 9000 Unit(s) IV Push every 6 hours PRN  heparin   Injectable 4000 Unit(s) IV Push every 6 hours PRN      VITAL SIGNS: Last 24 Hours  T(C): 37.1 (06 Feb 2023 12:00), Max: 37.1 (06 Feb 2023 12:00)  T(F): 98.8 (06 Feb 2023 12:00), Max: 98.8 (06 Feb 2023 12:00)  HR: 50 (06 Feb 2023 15:00) (40 - 92)  BP: 128/54 (06 Feb 2023 15:00) (81/43 - 160/60)  BP(mean): 84 (06 Feb 2023 15:00) (51 - 124)  RR: 23 (06 Feb 2023 15:00) (22 - 42)  SpO2: 99% (06 Feb 2023 15:00) (97% - 100%)      02-05-23 @ 07:01  -  02-06-23 @ 07:00  --------------------------------------------------------  IN: 3229.7 mL / OUT: 5275 mL / NET: -2045.3 mL    02-06-23 @ 07:01  -  02-06-23 @ 15:39  --------------------------------------------------------  IN: 935.7 mL / OUT: 1950 mL / NET: -1014.3 mL      LABS:                        9.1    22.93 )-----------( 175      ( 06 Feb 2023 04:55 )             27.8     02-06    140  |  101  |  78<HH>  ----------------------------<  175<H>  3.7   |  29  |  2.1<H>    Ca    10.8<H>      06 Feb 2023 04:55  Mg     1.9     02-06    TPro  5.0<L>  /  Alb  2.8<L>  /  TBili  0.4  /  DBili  x   /  AST  12  /  ALT  18  /  AlkPhos  133<H>  02-06    PT/INR - ( 06 Feb 2023 10:43 )   PT: 12.00 sec;   INR: 1.05 ratio         PTT - ( 06 Feb 2023 10:43 )  PTT:87.4 sec    ABG - ( 06 Feb 2023 15:05 )  pH, Arterial: 7.45  pH, Blood: x     /  pCO2: 44    /  pO2: 102   / HCO3: 31    / Base Excess: 5.8   /  SaO2: 97.3      RADIOLOGY:    Impression:    ET tube tip 5.7 cm above tor.    Stable bilateral basilar opacities    --- End of Report ---      PHYSICAL EXAM:  General: ill-looking  HEENT: Normocephalic, atraumatic, intubated   LUNGS: decrease breath sounds bilateral lungs   HEART: S1S2 present, regular rate and rhythm  ABDOMEN: Soft, nontender, nondistended  EXT: No edema

## 2023-02-06 NOTE — PROGRESS NOTE ADULT - ASSESSMENT
73 yo female kth afib, htn, solitary kidney presented from [facility] for SOB with COVID four days prior to admission     #Acute Hypoxic Respiratory Failure  #ARDS  - CT Chest No Cont (01.31.23 @ 21:17):  Mild right pleural effusion with adjacent  atelectasis. Bibasilar atelectatic changes versus scarring. Partial   obstruction of the distal right lower lobe bronchioles. Otherwise no  evidence of central endobronchial obstruction. No focal consolidation.  Right upper lobe pulmonary cyst (4-130). No pleural effusion. No  pneumothorax.  - Xray Chest 1 View-PORTABLE IMMEDIATE (Xray Chest 1 View-PORTABLE IMMEDIATE .) (02.01.23 @ 06:31):  Worsening opacifications and effusions. Support devices as described.    #Septic Shock secondary to COVID/UTI   - Blood Cx 1/31 Coag NEGATIVE staph - likely contaminant     #Proteus ESBL UTI - Urine Cx 1/31     #COVID-19 severe  #NOLA     #Solitary Kidney     Recommendations  - continue meropenem 500 mg q 24 hours   - trend WBC   - steroids/ARDS management per primary     Please call or message on [employer] Teams if with any questions.  Spectra [id number]

## 2023-02-06 NOTE — PROGRESS NOTE ADULT - ASSESSMENT
IMPRESSION:    Acute hypoxemic resp failure still intubated on 50%/ 16  ARDS  Sepsis POA  Septic shock  ESBL UTI  G+C bacteremia/ COAG - STAP  COVID +  NOLA  likely ATN vs prerenal imroving  Possible CAP  HO congentital unilateral kidney  HO A FIb not  on AC?      PLAN:    CNS: Sedate with fentanyl + (awake follows commands)    HEENT: Oral care, ETT    PULMONARY:  HOB @ 45 degrees.    Vent changes as follows: ARDSNET Vent setting.  RR to 24, repeat ABG 1 hour  monitor P/P/DP, repeat CXR daily, dec PEEP  Dexamethasone per COVID protocol    CARDIOVASCULAR: wean pressors, ECHO    GI: GI prophylaxis. feeding at goal, bowel regimen PRN    RENAL:  Follow up lytes.  Correct as needed. renal US. renal fup.  Maintain roughly net even.     INFECTIOUS DISEASE: Follow up cultures. Meropenem for ESBL Proteus, ID eval.  Dexamethasone for COVID    HEMATOLOGICAL: patient has a fib not on ac, heparin gtt    ENDOCRINE:  Follow up FS.  Insulin protocol if needed.    MUSCULOSKELETAL: bedrest    MICU.  L IJ/ ya 2/2

## 2023-02-06 NOTE — CONSULT NOTE ADULT - SUBJECTIVE AND OBJECTIVE BOX
CC:     HPI:    73 yo female with afib, htn, solitary kidney presented from [facility] for SOB. As per the family, the patient tested positive for covid 4 days ago and she has been feeling unwell. She was also SOB but she refused to visit the hospital. Today she was altered and her oxygen dropped to 70s in the NH, in addition to a drop in her bp. She presented to the ed and was placed on bipap with no improvement so she was intubated. She tested positive for covid, and her ct scan showed mild right pleural effusion. Admitted to MICU with AHRF, septic shock.   Currently patient is intubated, ventilated, on Levophed, sedated and on heparin infusion for     PERTINENT PM/SXH:   HTN (hypertension)    Diabetes mellitus    Obesity    Gastroesophageal reflux disease    Active asthma    Generalized OA    Afib      H/O hernia repair    History of cholecystectomy      FAMILY HISTORY:    ITEMS NOT CHECKED ARE NOT PRESENT    SOCIAL HISTORY:   Significant other/partner[ ]  Children[X ]  Mandaeism/Spirituality:  Substance hx:  [ ]   Tobacco hx:  [ ]   Alcohol hx: [ ]   Living Situation: [ ]Home  [ X ]Long term care  [ ]Rehab [ ]Other  Home Services: [ ] HHA [ ] Visting RN [ ] Hospice  Occupation:  Home Opioid hx:  [ ] Y [ X] N [ ] I-Stop Reference No: This report was requested by: Siomara Lagos | Reference #: [id number]    Patient Name: Iveth Hobbs Date: 1950  Address: [address] [place] [id number]: Female  Rx Written	Rx Dispensed	Drug	Quantity	Days Supply	Prescriber Name	Prescriber Lori #	Payment Method	Dispenser  01/25/2023	01/25/2023	oxycodone-acetaminophen  mg tab	90	30	Crhisto Melgar DO	[id number]	Medicaid	[employer]  12/12/2022	12/12/2022	oxycodone-acetaminophen  mg tab	45	15	Christo Melgar DO	[id number]	Other	[employer]  11/09/2022	11/09/2022	oxycodone-acetaminophen  mg tab	9	3	Christo Melgar DO	[id number]	Other	[employer]  09/03/2022	09/08/2022	oxycodone-acetaminophen  mg tab	9	3	Christo Elder)	[id number]	Other	[employer]  09/08/2022	09/08/2022	oxycodone-acetaminophen  mg tab	15	5	Christo Melgar DO	[id number]	Other	[employer]  09/03/2022	09/07/2022	oxycodone-acetaminophen  mg tab	9	3	Christo Elder)	[id number]	Other	[employer]  09/03/2022	09/03/2022	oxycodone-acetaminophen  mg tab	9	3	Christo Elder)	[id number]	Other	[employer]  08/05/2022	08/08/2022	endocet  mg tablet	81	27	ApergGibran hernandez	[id number]	Other	[employer]  08/05/2022	08/05/2022	endocet  mg tablet	9	3	ApergisGibran	[id number]	Other	[employer]  07/05/2022	07/05/2022	oxycodone-acetaminophen  mg tab	90	30	Christo Melgar DO	[id number]	Other	[employer]  06/06/2022	06/06/2022	oxycodone-acetaminophen  mg tab	90	30	Christo Melgar DO	[id number]	Other	[employer]  04/07/2022	04/08/2022	oxycodone-acetaminophen  mg tab	90	30	Christo Melgar DO	[id number]	Other	[employer]  03/20/2022	03/20/2022	endocet  mg tablet	42	14	Christo Elder)	[id number]	Medicaid	[employer]  02/09/2022	02/09/2022	endocet  mg tablet	42	14	Christo Melgar DO	[id number]	Medicaid	[employer]  02/07/2022	02/07/2022	endocet  mg tablet	6	2	Christo Melgar DO	[id number]	Other	[employer]      ADVANCE DIRECTIVES:    [X ] Full Code [ ] DNR  MOLST  [ ]  Living Will  [ ]   DECISION MAKER(s): Children   [ ] Health Care Proxy(s)  [ X] Surrogate(s)  [ ] Guardian           Name(s): Phone Number(s): Daughter and son- Tobi    BASELINE (I)ADL(s) (prior to admission):  [place]: [ ]Total  [ X] Moderate [ ]Dependent  Palliative Performance Status Version 2:      40   %    http://[facility].org/files/news/palliative_performance_scale_ppsv2.pdf    Allergies    No Known Allergies    Intolerances    MEDICATIONS  (STANDING):  chlorhexidine 0.12% Liquid 15 milliLiter(s) Oral Mucosa every 12 hours  chlorhexidine 2% Cloths 1 Application(s) Topical daily  dexAMETHasone  Injectable 6 milliGRAM(s) IV Push daily  dexMEDEtomidine Infusion 0.2 MICROgram(s)/kG/Hr (5.5 mL/Hr) IV Continuous <Continuous>  fentaNYL   Infusion. 0.5 MICROgram(s)/kG/Hr (5.5 mL/Hr) IV Continuous <Continuous>  glucagon  Injectable 1 milliGRAM(s) IntraMuscular once  heparin  Infusion. 2400 Unit(s)/Hr (24 mL/Hr) IV Continuous <Continuous>  insulin lispro (ADMELOG) corrective regimen sliding scale   SubCutaneous every 6 hours  meropenem  IVPB 500 milliGRAM(s) IV Intermittent every 12 hours  norepinephrine Infusion 0.05 MICROgram(s)/kG/Min (5.16 mL/Hr) IV Continuous <Continuous>  polyethylene glycol 3350 17 Gram(s) Oral daily  senna 2 Tablet(s) Oral at bedtime  sodium bicarbonate  Injectable 100 milliEquivalent(s) IV Push once    MEDICATIONS  (PRN):  acetaminophen  Suppository .. 650 milliGRAM(s) Rectal every 4 hours PRN Temp greater or equal to 38.5C (101.3F)  heparin   Injectable 9000 Unit(s) IV Push every 6 hours PRN For aPTT less than 40  heparin   Injectable 4000 Unit(s) IV Push every 6 hours PRN For aPTT between 40 - 57    PRESENT SYMPTOMS: [ ]Unable to obtain due to poor mentation   Source if other than patient:  [ ]Family   [ ]Team     Pain: [ ]yes [ ]no  QOL impact -   Location -                    Aggravating factors -  Quality -  Radiation -  Timing-  Severity (0-10 scale):  Minimal acceptable level (0-10 scale):     CPOT:    https://www.[facility].org/getattachment/[email or web address]/Critical-Care-Pain-Observation-Tool-(CPOT)      PAIN AD Score:     http://geriatrictoolkit.[facility]/cog/painad.pdf (press ctrl +  left click to view)      Dyspnea:                           [ ]Mild [ ]Moderate [ ]Severe [ ]None  Anxiety:                             [ ]Mild [ ]Moderate [ ]Severe [ ]None  Fatigue:                             [ ]Mild [ ]Moderate [ ]Severe [ ]None  Nausea:                             [ ]Mild [ ]Moderate [ ]Severe [ ]None  Loss of appetite:              [ ]Mild [ ]Moderate [ ]Severe [ ]None  Constipation:                    [ ]Mild [ ]Moderate [ ]Severe [ ]None    Other Symptoms:  [ ]All other review of systems negative     Palliative Performance Status Version 2:         %    http://npcrc.org/files/news/palliative_performance_scale_ppsv2.pdf  PHYSICAL EXAM:  Vital Signs Last 24 Hrs  T(C): 36.6 (06 Feb 2023 08:00), Max: 36.9 (05 Feb 2023 16:00)  T(F): 97.9 (06 Feb 2023 08:00), Max: 98.4 (05 Feb 2023 16:00)  HR: 52 (06 Feb 2023 09:00) (40 - 92)  BP: 126/52 (06 Feb 2023 09:00) (81/43 - 160/60)  BP(mean): 78 (06 Feb 2023 09:00) (51 - 124)  RR: 24 (06 Feb 2023 09:00) (22 - 42)  SpO2: 97% (06 Feb 2023 09:00) (97% - 100%)    GENERAL:  [ ] No acute distress [ ]Lethargic  [ ]Unarousable  [ ]Verbal  [ ]Non-Verbal [ ]Cachexia    BEHAVIORAL/PSYCH:  [ ]Alert and Oriented x  [ ] Anxiety [ ] Delirium [ ] Agitation [ ] Calm   EYES: [ ] No scleral icterus [ ] Scleral icterus [ ] Closed  ENMT:  [ ]Dry mouth  [ ]No external oral lesions [ ] No external ear or nose lesions  CARDIOVASCULAR:  [ ]Regular [ ]Irregular [ ]Tachy [ ]Not Tachy  [ ]Jay [ ] Edema [ ] No edema  PULMONARY:  [ ]Tachypnea  [ ]Audible excessive secretions [ ] No labored breathing [ ] labored breathing  GASTROINTESTINAL: [ ]Soft  [ ]Distended  [ ]Not distended [ ]Non tender [ ]Tender  MUSCULOSKELETAL: [ ]No clubbing [ ] clubbing  [ ] No cyanosis [ ] cyanosis  NEUROLOGIC: [ ]No focal deficits  [ ]Follows commands  [ ]Does not follow commands  [ ]Cognitive impairment  [ ]Dysphagia  [ ]Dysarthria  [ ]Paresis   SKIN: [ ] Jaundiced [ ] Non-jaundiced [ ]Rash [ ]No Rash [ ] Warm [ ] Dry  MISC/LINES: [ ] ET tube [ ] Trach [ ]NGT/OGT [ ]PEG [ ]Burger    CRITICAL CARE:  [ ] Shock Present  [ ]Septic [ ]Cardiogenic [ ]Neurologic [ ]Hypovolemic  [ ]  Vasopressors [ ]  Inotropes   [ ]Respiratory failure present [ ]Mechanical ventilation [ ]Non-invasive ventilatory support [ ]High flow  [ ]Acute  [ ]Chronic [ ]Hypoxic  [ ]Hypercarbic [ ]Other  [ ]Other organ failure     LABS: reviewed by me                        9.1    22.93 )-----------( 175      ( 06 Feb 2023 04:55 )             27.8   02-06    140  |  101  |  78<HH>  ----------------------------<  175<H>  3.7   |  29  |  2.1<H>    Ca    10.8<H>      06 Feb 2023 04:55  Mg     1.9     02-06    TPro  5.0<L>  /  Alb  2.8<L>  /  TBili  0.4  /  DBili  x   /  AST  12  /  ALT  18  /  AlkPhos  133<H>  02-06  PTT - ( 06 Feb 2023 04:55 )  PTT:76.0 sec      RADIOLOGY & ADDITIONAL STUDIES: reviewed by me    EKG: reviewed by me    < from: 12 Lead ECG (02.05.23 @ 04:04) >  Ventricular Rate 48 BPM    Atrial Rate 48 BPM    QRS Duration 117 ms    Q-T Interval 488 ms    QTC Calculation(Bazett) 436 ms    R Axis 95 degrees    T Axis 44 degrees    Diagnosis Line Atrial fibrillation with slow ventricular response  Rightward axis  Nonspecific T wave abnormality  Abnormal ECG    Confirmed by Stacy Blackburn MD ([id number]) on 2/5/2023 7:19:06     < end of copied text >    < from: CT Chest No Cont (01.31.23 @ 21:17) >      IMPRESSION:    On this breathing motion, quantum mottle, and truncation artifact limited   examination:    Mild right pleural effusion.    Dilated main pulmonary artery, which can be seen in pulmonary   hypertension.    Chronic findings as above.    --- End of Report ---    < end of copied text >      REFERRALS:   [ ]Chaplaincy  [ ]Hospice  [ ]Child Life  [ ]Social Work  [ ]Case management [ ]Holistic Therapy     Goals of Care Document:    CC:     HPI:    73 yo female with afib, htn, solitary kidney presented from [facility] for COVID pneumonia. She was also SOB but she refused to visit the hospital. She presented to the ed and was placed on bipap with no improvement so she was intubated. She tested positive for covid, and her ct scan showed mild right pleural effusion. Admitted to MICU with AHRF, septic shock.  Currently patient is intubated, ventilated, on Levophed, sedated and on heparin infusion. Palliative consulted for high LACE score.     PERTINENT PM/SXH:   HTN (hypertension)    Diabetes mellitus    Obesity    Gastroesophageal reflux disease    Active asthma    Generalized OA    Afib      H/O hernia repair    History of cholecystectomy      FAMILY HISTORY:    ITEMS NOT CHECKED ARE NOT PRESENT    SOCIAL HISTORY:   Significant other/partner[ ]  Children[X ]  Mandaeism/Spirituality:  Substance hx:  [ ]   Tobacco hx:  [ ]   Alcohol hx: [ ]   Living Situation: [ ]Home  [ X ]Long term care  [ ]Rehab [ ]Other  Home Services: [ ] HHA [ ] Visting RN [ ] Hospice  Occupation:  Home Opioid hx:  [ ] Y [ X] N [ ] I-Stop Reference No: This report was requested by: Siomara Lagos | Reference #: [id number]    Patient Name: Iveth SpencerBirth Date: 1950  Address: [address] [place] [id number]: Female  Rx Written	Rx Dispensed	Drug	Quantity	Days Supply	Prescriber Name	Prescriber Lori #	Payment Method	Dispenser  01/25/2023	01/25/2023	oxycodone-acetaminophen  mg tab	90	30	Christo Melgar DO	[id number]	Medicaid	[employer]  12/12/2022	12/12/2022	oxycodone-acetaminophen  mg tab	45	15	Christo Melgar DO	[id number]	Other	[employer]  11/09/2022	11/09/2022	oxycodone-acetaminophen  mg tab	9	3	Christo Melgar DO	[id number]	Other	[employer]  09/03/2022	09/08/2022	oxycodone-acetaminophen  mg tab	9	3	Christo Elder)	[id number]	Other	[employer]  09/08/2022	09/08/2022	oxycodone-acetaminophen  mg tab	15	5	Christo Melgar DO	[id number]	Other	[employer]  09/03/2022	09/07/2022	oxycodone-acetaminophen  mg tab	9	3	Christo Elder)	[id number]	Other	[employer]  09/03/2022	09/03/2022	oxycodone-acetaminophen  mg tab	9	3	Christo Elder)	[id number]	Other	[employer]  08/05/2022	08/08/2022	endocet  mg tablet	81	27	ApergGibran hernandez	[id number]	Other	[employer]  08/05/2022	08/05/2022	endocet  mg tablet	9	3	Apergis, Gibran NICHOLSON	[id number]	Other	[employer]  07/05/2022	07/05/2022	oxycodone-acetaminophen  mg tab	90	30	MastChristo vasquez DO	[id number]	Other	[employer]  06/06/2022	06/06/2022	oxycodone-acetaminophen  mg tab	90	30	MastChristo vasquez DO	[id number]	Other	[employer]  04/07/2022	04/08/2022	oxycodone-acetaminophen  mg tab	90	30	Christo Melgar DO	[id number]	Other	[employer]  03/20/2022	03/20/2022	endocet  mg tablet	42	14	Christo Elder)	[id number]	Medicaid	[employer]  02/09/2022	02/09/2022	endocet  mg tablet	42	14	MastChristo vasquez DO	[id number]	Medicaid	[employer]  02/07/2022	02/07/2022	endocet  mg tablet	6	2	MastChirsto vasquez DO	[id number]	Other	[employer]      ADVANCE DIRECTIVES:    [X ] Full Code [ ] DNR  MOLST  [ ]  Living Will  [ ]   DECISION MAKER(s): Children   [ ] Health Care Proxy(s)  [ X] Surrogate(s)  [ ] Guardian           Name(s): Phone Number(s): Daughter and son- Tobi    BASELINE (I)ADL(s) (prior to admission):  [place]: [ ]Total  [ X] Moderate [ ]Dependent  Palliative Performance Status Version 2:      40   %    http://[facility].org/files/news/palliative_performance_scale_ppsv2.pdf    Allergies    No Known Allergies    Intolerances    MEDICATIONS  (STANDING):  chlorhexidine 0.12% Liquid 15 milliLiter(s) Oral Mucosa every 12 hours  chlorhexidine 2% Cloths 1 Application(s) Topical daily  dexAMETHasone  Injectable 6 milliGRAM(s) IV Push daily  dexMEDEtomidine Infusion 0.2 MICROgram(s)/kG/Hr (5.5 mL/Hr) IV Continuous <Continuous>  fentaNYL   Infusion. 0.5 MICROgram(s)/kG/Hr (5.5 mL/Hr) IV Continuous <Continuous>  glucagon  Injectable 1 milliGRAM(s) IntraMuscular once  heparin  Infusion. 2400 Unit(s)/Hr (24 mL/Hr) IV Continuous <Continuous>  insulin lispro (ADMELOG) corrective regimen sliding scale   SubCutaneous every 6 hours  meropenem  IVPB 500 milliGRAM(s) IV Intermittent every 12 hours  norepinephrine Infusion 0.05 MICROgram(s)/kG/Min (5.16 mL/Hr) IV Continuous <Continuous>  polyethylene glycol 3350 17 Gram(s) Oral daily  senna 2 Tablet(s) Oral at bedtime  sodium bicarbonate  Injectable 100 milliEquivalent(s) IV Push once    MEDICATIONS  (PRN):  acetaminophen  Suppository .. 650 milliGRAM(s) Rectal every 4 hours PRN Temp greater or equal to 38.5C (101.3F)  heparin   Injectable 9000 Unit(s) IV Push every 6 hours PRN For aPTT less than 40  heparin   Injectable 4000 Unit(s) IV Push every 6 hours PRN For aPTT between 40 - 57    PRESENT SYMPTOMS:  * Patient only able to say yes or no as she is intubated  Pain: [ ]yes [X ]no  QOL impact -   Location -                    Aggravating factors -  Quality -  Radiation -  Timing-  Severity (0-10 scale):  Minimal acceptable level (0-10 scale):     CPOT:  0  https://www.[facility].org/getattachment/[email or web address]/Critical-Care-Pain-Observation-Tool-(CPOT      Dyspnea:                           [ ]Mild [ ]Moderate [ ]Severe [ X]None  Anxiety:                             [ ]Mild [ ]Moderate [ ]Severe [X ]None  Fatigue:                             [ ]Mild [ ]Moderate [ ]Severe [ ]None - Yes  Nausea:                             [ ]Mild [ ]Moderate [ ]Severe [ ]None  Loss of appetite:              [ ]Mild [ ]Moderate [ ]Severe [ ]None  Constipation:                    [ ]Mild [ ]Moderate [ ]Severe [ ]None    Other Symptoms:  [ X]All other review of systems negative     Palliative Performance Status Version 2:     10    %    http://[facility].org/files/news/palliative_performance_scale_ppsv2.pdf    PHYSICAL EXAM:  Vital Signs Last 24 Hrs  T(C): 36.6 (06 Feb 2023 08:00), Max: 36.9 (05 Feb 2023 16:00)  T(F): 97.9 (06 Feb 2023 08:00), Max: 98.4 (05 Feb 2023 16:00)  HR: 52 (06 Feb 2023 09:00) (40 - 92)  BP: 126/52 (06 Feb 2023 09:00) (81/43 - 160/60)  BP(mean): 78 (06 Feb 2023 09:00) (51 - 124)  RR: 24 (06 Feb 2023 09:00) (22 - 42)  SpO2: 97% (06 Feb 2023 09:00) (97% - 100%)    GENERAL:  [ X] No acute distress [X ]Lethargic  [ ]Unarousable  [ ]Verbal  [ ]Non-Verbal [ ]Cachexia    BEHAVIORAL/PSYCH:  [ X]Alert and Oriented x 1-3 [ ] Anxiety [ ] Delirium [ ] Agitation [ X] Calm   EYES: [X ] No scleral icterus [ ] Scleral icterus [ ] Closed  ENMT:  [ ]Dry mouth  [ X]No external oral lesions [X ] No external ear or nose lesions  PULMONARY:  [ ]Tachypnea  [ ]Audible excessive secretions [ X] No labored breathing [ ] labored breathing  GASTROINTESTINAL: [X ]Soft  [ ]Distended  [ ]Not distended [ ]Non tender [ ]Tender  MUSCULOSKELETAL: [X ]No clubbing [ ] clubbing  [ X] No cyanosis [ ] cyanosis  NEUROLOGIC: [ ]No focal deficits  [ ]Follows commands  [ ]Does not follow commands  [ ]Cognitive impairment  [ ]Dysphagia  [ ]Dysarthria  [ ]Paresis   SKIN: [ ] Jaundiced [ ] Non-jaundiced [ ]Rash [ ]No Rash [ ] Warm [ ] Dry  MISC/LINES: [ ] ET tube [ ] Trach [ ]NGT/OGT [ ]PEG [ ]Burger    CRITICAL CARE:  [ ] Shock Present  [ ]Septic [ ]Cardiogenic [ ]Neurologic [ ]Hypovolemic  [ ]  Vasopressors [ ]  Inotropes   [ ]Respiratory failure present [X ]Mechanical ventilation [ ]Non-invasive ventilatory support [ ]High flow  [ ]Acute  [ ]Chronic [ ]Hypoxic  [ ]Hypercarbic [ ]Other  [ ]Other organ failure     LABS: reviewed by me                        9.1    22.93 )-----------( 175      ( 06 Feb 2023 04:55 )             27.8   02-06    140  |  101  |  78<HH>  ----------------------------<  175<H>  3.7   |  29  |  2.1<H>    Ca    10.8<H>      06 Feb 2023 04:55  Mg     1.9     02-06    TPro  5.0<L>  /  Alb  2.8<L>  /  TBili  0.4  /  DBili  x   /  AST  12  /  ALT  18  /  AlkPhos  133<H>  02-06  PTT - ( 06 Feb 2023 04:55 )  PTT:76.0 sec      RADIOLOGY & ADDITIONAL STUDIES: reviewed by me    EKG: reviewed by me    < from: 12 Lead ECG (02.05.23 @ 04:04) >  Ventricular Rate 48 BPM    Atrial Rate 48 BPM    QRS Duration 117 ms    Q-T Interval 488 ms    QTC Calculation(Bazett) 436 ms    R Axis 95 degrees    T Axis 44 degrees    Diagnosis Line Atrial fibrillation with slow ventricular response  Rightward axis  Nonspecific T wave abnormality  Abnormal ECG    Confirmed by Stacy Blackburn MD ([id number]) on 2/5/2023 7:19:06     < end of copied text >    < from: CT Chest No Cont (01.31.23 @ 21:17) >      IMPRESSION:    On this breathing motion, quantum mottle, and truncation artifact limited   examination:    Mild right pleural effusion.    Dilated main pulmonary artery, which can be seen in pulmonary   hypertension.    Chronic findings as above.    --- End of Report ---    < end of copied text >    Goals of Care Document:   - will reach out to family as appropriate  HPI: 73 yo female with afib, htn, solitary kidney presented from [facility] for COVID pneumonia. She was also SOB but she refused to visit the hospital. She presented to the ed and was placed on bipap with no improvement so she was intubated. She tested positive for covid, and her ct scan showed mild right pleural effusion. Admitted to MICU with AHRF, septic shock.  Currently patient is intubated, ventilated, on Levophed, sedated and on heparin infusion. Palliative consulted for high LACE score.     PERTINENT PM/SXH:   HTN (hypertension)    Diabetes mellitus    Obesity    Gastroesophageal reflux disease    Active asthma    Generalized OA    Afib      H/O hernia repair    History of cholecystectomy      FAMILY HISTORY: difficult to obtain from patient    ITEMS NOT CHECKED ARE NOT PRESENT    SOCIAL HISTORY:   Significant other/partner[ ]  Children[X ]  Caodaism/Spirituality:  Substance hx:  [ ]   Tobacco hx:  [ ]   Alcohol hx: [ ]   Living Situation: [ ]Home  [ X ]Long term care  [ ]Rehab [ ]Other  Home Services: [ ] HHA [ ] Visting RN [ ] Hospice  Occupation:  Home Opioid hx:  [ ] Y [ X] N [ ] I-Stop Reference No: This report was requested by: Siomara Lagos | Reference #: [id number]    Patient Name: Iveth SpencerBirth Date: 1950  Address: [address] [place] [id number]: Female  Rx Written	Rx Dispensed	Drug	Quantity	Days Supply	Prescriber Name	Prescriber Lori #	Payment Method	Dispenser  01/25/2023	01/25/2023	oxycodone-acetaminophen  mg tab	90	30	Christo Melgar DO	[id number]	Medicaid	[employer]  12/12/2022	12/12/2022	oxycodone-acetaminophen  mg tab	45	15	Christo Melgar DO	[id number]	Other	[employer]  11/09/2022	11/09/2022	oxycodone-acetaminophen  mg tab	9	3	Christo Melgar DO	[id number]	Other	[employer]  09/03/2022	09/08/2022	oxycodone-acetaminophen  mg tab	9	3	Christo Elder)	[id number]	Other	[employer]  09/08/2022	09/08/2022	oxycodone-acetaminophen  mg tab	15	5	Christo Melgar DO	[id number]	Other	[employer]  09/03/2022	09/07/2022	oxycodone-acetaminophen  mg tab	9	3	Christo Elder)	[id number]	Other	[employer]  09/03/2022	09/03/2022	oxycodone-acetaminophen  mg tab	9	3	Christo Elder)	[id number]	Other	[employer]  08/05/2022	08/08/2022	endocet  mg tablet	81	27	ApergisGibran	[id number]	Other	[employer]  08/05/2022	08/05/2022	endocet  mg tablet	9	3	Apergis, Gibran NICHOLSON	[id number]	Other	[employer]  07/05/2022	07/05/2022	oxycodone-acetaminophen  mg tab	90	30	Christo Melgar DO	[id number]	Other	[employer]  06/06/2022	06/06/2022	oxycodone-acetaminophen  mg tab	90	30	MastriChristo adkins DO	[id number]	Other	[employer]  04/07/2022	04/08/2022	oxycodone-acetaminophen  mg tab	90	30	MalikariChristo adkins DO	[id number]	Other	[employer]  03/20/2022	03/20/2022	endocet  mg tablet	42	14	Christo Elder)	[id number]	Medicaid	[employer]  02/09/2022	02/09/2022	endocet  mg tablet	42	14	Christo Melgar DO	[id number]	Medicaid	[employer]  02/07/2022	02/07/2022	endocet  mg tablet	6	2	Christo Melgar DO	[id number]	Other	[employer]      ADVANCE DIRECTIVES:    [X ] Full Code [ ] DNR  MOLST  [ ]  Living Will  [ ]   DECISION MAKER(s): Children   [ ] Health Care Proxy(s)  [ X] Surrogate(s)  [ ] Guardian           Name(s): Phone Number(s): Daughter and son- Tobi    BASELINE (I)ADL(s) (prior to admission):  [place]: [ ]Total  [ X] Moderate [ ]Dependent  Palliative Performance Status Version 2:      40   %    http://[facility].org/files/news/palliative_performance_scale_ppsv2.pdf    Allergies    No Known Allergies    Intolerances    MEDICATIONS  (STANDING):  chlorhexidine 0.12% Liquid 15 milliLiter(s) Oral Mucosa every 12 hours  chlorhexidine 2% Cloths 1 Application(s) Topical daily  dexAMETHasone  Injectable 6 milliGRAM(s) IV Push daily  dexMEDEtomidine Infusion 0.2 MICROgram(s)/kG/Hr (5.5 mL/Hr) IV Continuous <Continuous>  fentaNYL   Infusion. 0.5 MICROgram(s)/kG/Hr (5.5 mL/Hr) IV Continuous <Continuous>  glucagon  Injectable 1 milliGRAM(s) IntraMuscular once  heparin  Infusion. 2400 Unit(s)/Hr (24 mL/Hr) IV Continuous <Continuous>  insulin lispro (ADMELOG) corrective regimen sliding scale   SubCutaneous every 6 hours  meropenem  IVPB 500 milliGRAM(s) IV Intermittent every 24 hours  norepinephrine Infusion 0.05 MICROgram(s)/kG/Min (5.16 mL/Hr) IV Continuous <Continuous>  polyethylene glycol 3350 17 Gram(s) Oral daily  senna 2 Tablet(s) Oral at bedtime  sodium bicarbonate  Injectable 100 milliEquivalent(s) IV Push once    MEDICATIONS  (PRN):  acetaminophen  Suppository .. 650 milliGRAM(s) Rectal every 4 hours PRN Temp greater or equal to 38.5C (101.3F)  heparin   Injectable 9000 Unit(s) IV Push every 6 hours PRN For aPTT less than 40  heparin   Injectable 4000 Unit(s) IV Push every 6 hours PRN For aPTT between 40 - 57      PRESENT SYMPTOMS:  * Patient only able to say yes or no as she is intubated  Pain: [ ]yes [X ]no  QOL impact -   Location -                    Aggravating factors -  Quality -  Radiation -  Timing-  Severity (0-10 scale):  Minimal acceptable level (0-10 scale):     CPOT:  0  [email or web address]      Dyspnea:                           [ ]Mild [ ]Moderate [ ]Severe [ X]None  Anxiety:                             [ ]Mild [ ]Moderate [ ]Severe [X ]None  Fatigue:                             [ ]Mild [ ]Moderate [ ]Severe [ ]None - Yes  Nausea:                             [ ]Mild [ ]Moderate [ ]Severe [ ]None  Loss of appetite:              [ ]Mild [ ]Moderate [ ]Severe [ ]None  Constipation:                    [ ]Mild [ ]Moderate [ ]Severe [ ]None    Other Symptoms:  [ X]All other review of systems negative     Palliative Performance Status Version 2:     10    %    [email or web address]    PHYSICAL EXAM:  Vital Signs Last 24 Hrs  T(C): 36.6 (06 Feb 2023 08:00), Max: 36.9 (05 Feb 2023 16:00)  T(F): 97.9 (06 Feb 2023 08:00), Max: 98.4 (05 Feb 2023 16:00)  HR: 52 (06 Feb 2023 09:00) (40 - 92)  BP: 126/52 (06 Feb 2023 09:00) (81/43 - 160/60)  BP(mean): 78 (06 Feb 2023 09:00) (51 - 124)  RR: 24 (06 Feb 2023 09:00) (22 - 42)  SpO2: 97% (06 Feb 2023 09:00) (97% - 100%)    GENERAL:  [ X] No acute distress [X ]Lethargic  [ ]Unarousable  [ ]Verbal  [ ]Non-Verbal [ ]Cachexia    BEHAVIORAL/PSYCH:  [ X]Alert and Oriented x 1-3 [ ] Anxiety [ ] Delirium [ ] Agitation [ X] Calm   EYES: [X ] No scleral icterus [ ] Scleral icterus [ ] Closed  ENMT:  [ ]Dry mouth  [ X]No external oral lesions [X ] No external ear or nose lesions  PULMONARY:  [ ]Tachypnea  [ ]Audible excessive secretions [ X] No labored breathing [ ] labored breathing  GASTROINTESTINAL: [X ]Soft  [ ]Distended  [ ]Not distended [ ]Non tender [ ]Tender  MUSCULOSKELETAL: [X ]No clubbing [ ] clubbing  [ X] No cyanosis [ ] cyanosis  NEUROLOGIC: [ ]No focal deficits  [ ]Follows commands  [ ]Does not follow commands  [ ]Cognitive impairment  [ ]Dysphagia  [ ]Dysarthria  [ ]Paresis   SKIN: [ ] Jaundiced [ ] Non-jaundiced [ ]Rash [ ]No Rash [ ] Warm [ ] Dry  MISC/LINES: [ ] ET tube [ ] Trach [ ]NGT/OGT [ ]PEG [ ]Burger    CRITICAL CARE:  [ ] Shock Present  [ ]Septic [ ]Cardiogenic [ ]Neurologic [ ]Hypovolemic  [ ]  Vasopressors [ ]  Inotropes   [ ]Respiratory failure present [X ]Mechanical ventilation [ ]Non-invasive ventilatory support [ ]High flow  [ ]Acute  [ ]Chronic [ ]Hypoxic  [ ]Hypercarbic [ ]Other  [ ]Other organ failure     LABS: reviewed by me                        9.1    22.93 )-----------( 175      ( 06 Feb 2023 04:55 )             27.8   02-06    140  |  101  |  78<HH>  ----------------------------<  175<H>  3.7   |  29  |  2.1<H>    Ca    10.8<H>      06 Feb 2023 04:55  Mg     1.9     02-06    TPro  5.0<L>  /  Alb  2.8<L>  /  TBili  0.4  /  DBili  x   /  AST  12  /  ALT  18  /  AlkPhos  133<H>  02-06  PTT - ( 06 Feb 2023 04:55 )  PTT:76.0 sec      RADIOLOGY & ADDITIONAL STUDIES: reviewed by me    EKG: reviewed by me    < from: 12 Lead ECG (02.05.23 @ 04:04) >  Ventricular Rate 48 BPM    Atrial Rate 48 BPM    QRS Duration 117 ms    Q-T Interval 488 ms    QTC Calculation(Bazett) 436 ms    R Axis 95 degrees    T Axis 44 degrees    Diagnosis Line Atrial fibrillation with slow ventricular response  Rightward axis  Nonspecific T wave abnormality  Abnormal ECG    Confirmed by Stacy Blackburn MD ([id number]) on 2/5/2023 7:19:06     < end of copied text >    < from: CT Chest No Cont (01.31.23 @ 21:17) >      IMPRESSION:    On this breathing motion, quantum mottle, and truncation artifact limited   examination:    Mild right pleural effusion.    Dilated main pulmonary artery, which can be seen in pulmonary   hypertension.    Chronic findings as above.    --- End of Report ---    < end of copied text >    Goals of Care Document:   - will reach out to family as appropriate

## 2023-02-06 NOTE — CONSULT NOTE ADULT - PROBLEM SELECTOR RECOMMENDATION 9
2/2 COVID  intubated since 1/31  continue vent support per pulmonology  ID following for recommendations 2/2 COVID  intubated since 1/31  continue vent support per pulmonology  ID following for recommendations  c/w meropenem, high risk, monitor counts

## 2023-02-06 NOTE — PROGRESS NOTE ADULT - SUBJECTIVE AND OBJECTIVE BOX
PATRICIA SPRAGUE  72y, Female  Allergy: No Known Allergies      LOS  6d    CHIEF COMPLAINT: respiratory distress (06 Feb 2023 15:39)      INTERVAL EVENTS/HPI  - No acute events overnight  - T(F): , Max: 98.8 (02-06-23 @ 12:00)  - off levophed, FIO2 50%  - WBC Count: 22.93 (02-06-23 @ 04:55)  WBC Count: 25.81 (02-05-23 @ 04:20)     - Creatinine, Serum: 2.1 (02-06-23 @ 04:55)  Creatinine, Serum: 2.2 (02-05-23 @ 20:55)       ROS  General: Denies rigors, nightsweats  HEENT: Denies headache, rhinorrhea, sore throat, eye pain  CV: Denies CP, palpitations  PULM: Denies wheezing, hemoptysis  GI: Denies hematemesis, hematochezia, melena  : Denies discharge, hematuria  MSK: Denies arthralgias, myalgias  SKIN: Denies rash, lesions  NEURO: Denies paresthesias, weakness  PSYCH: Denies depression, anxiety    VITALS:  T(F): 98.8, Max: 98.8 (02-06-23 @ 12:00)  HR: 62  BP: 128/54  RR: 23Vital Signs Last 24 Hrs  T(C): 37.1 (06 Feb 2023 12:00), Max: 37.1 (06 Feb 2023 12:00)  T(F): 98.8 (06 Feb 2023 12:00), Max: 98.8 (06 Feb 2023 12:00)  HR: 62 (06 Feb 2023 15:42) (40 - 92)  BP: 128/54 (06 Feb 2023 15:00) (81/43 - 160/60)  BP(mean): 84 (06 Feb 2023 15:00) (51 - 124)  RR: 23 (06 Feb 2023 15:00) (22 - 42)  SpO2: 98% (06 Feb 2023 15:42) (97% - 100%)    Parameters below as of 06 Feb 2023 12:00  Patient On (Oxygen Delivery Method): ventilator    O2 Concentration (%): 50    PHYSICAL EXAM:  Gen: NAD, resting in bed  HEENT: Normocephalic, atraumatic  Neck: supple, no lymphadenopathy  CV: Regular rate & regular rhythm  Lungs: decreased BS at bases, no fremitus  Abdomen: Soft, BS present  Ext: Warm, well perfused  Neuro: non focal, awake  Skin: no rash, no erythema  Lines: no phlebitis    FH: Non-contributory  Social Hx: Non-contributory    TESTS & MEASUREMENTS:                        9.1    22.93 )-----------( 175      ( 06 Feb 2023 04:55 )             27.8     02-06    140  |  101  |  78<HH>  ----------------------------<  175<H>  3.7   |  29  |  2.1<H>    Ca    10.8<H>      06 Feb 2023 04:55  Mg     1.9     02-06    TPro  5.0<L>  /  Alb  2.8<L>  /  TBili  0.4  /  DBili  x   /  AST  12  /  ALT  18  /  AlkPhos  133<H>  02-06      LIVER FUNCTIONS - ( 06 Feb 2023 04:55 )  Alb: 2.8 g/dL / Pro: 5.0 g/dL / ALK PHOS: 133 U/L / ALT: 18 U/L / AST: 12 U/L / GGT: x               Culture - Sputum (collected 02-03-23 @ 09:46)  Source: Trach Asp Tracheal Aspirate  Gram Stain (02-03-23 @ 23:04):    Few polymorphonuclear leukocytes per low power field    Rare Squamous epithelial cells per low power field    Rare Gram positive cocci in pairs per oil power field  Final Report (02-05-23 @ 15:11):    Normal Respiratory Samia present    Culture - Urine (collected 01-31-23 @ 20:15)  Source: Clean Catch Clean Catch (Midstream)  Final Report (02-03-23 @ 07:37):    >100,000 CFU/ml Proteus mirabilis ESBL  Organism: Proteus mirabilis ESBL (02-03-23 @ 07:37)  Organism: Proteus mirabilis ESBL (02-03-23 @ 07:37)      -  Amikacin: S <=16      -  Amoxicillin/Clavulanic Acid: S <=8/4      -  Ampicillin: R >16 These ampicillin results predict results for amoxicillin      -  Ampicillin/Sulbactam: S <=4/2 Enterobacter, Klebsiella aerogenes, Citrobacter, and Serratia may develop resistance during prolonged therapy (3-4 days)      -  Aztreonam: R <=4      -  Cefazolin: R >16 For uncomplicated UTI with K. pneumoniae, E. coli, or P. mirablis: NAGI <=16 is sensitive and NAGI >=32 is resistant. This also predicts results for oral agents cefaclor, cefdinir, cefpodoxime, cefprozil, cefuroxime axetil, cephalexin and locarbef for uncomplicated UTI. Note that some isolates may be susceptible to these agents while testing resistant to cefazolin.      -  Cefepime: R >16      -  Ceftriaxone: R >32 Enterobacter, Klebsiella aerogenes, Citrobacter, and Serratia may develop resistance during prolonged therapy      -  Cefuroxime: R >16      -  Ciprofloxacin: R >2      -  Ertapenem: S <=0.5      -  Gentamicin: S <=2      -  Levofloxacin: R >4      -  Meropenem: S <=1      -  Nitrofurantoin: R >64 Should not be used to treat pyelonephritis      -  Piperacillin/Tazobactam: S <=8      -  Tobramycin: S <=2      -  Trimethoprim/Sulfamethoxazole: R >2/38      Method Type: NAGI    Culture - Blood (collected 01-31-23 @ 19:03)  Source: .Blood Blood-Peripheral  Final Report (02-06-23 @ 01:00):    No Growth Final    Culture - Blood (collected 01-31-23 @ 19:03)  Source: .Blood Blood-Peripheral  Gram Stain (02-02-23 @ 08:28):    Growth in aerobic bottle: Gram Positive Cocci in Clusters  Final Report (02-02-23 @ 21:27):    Growth in aerobic bottle: Staphylococcus epidermidis    Coag Negative Staphylococcus    Single set isolate, possible contaminant. Contact    Microbiology if susceptibility testing clinically    indicated.    ***Blood Panel PCR results on this specimen are available    approximately 3 hours after the Gram stain result.***    Gram stain, PCR, and/or culture results may not always    correspond due to difference in methodologies.    ************************************************************    This PCR assay was performed by multiplex PCR. This    Assay tests for 66 bacterial and resistance gene targets.    Please refer to the [facility] test directory    at [email or web address] for details.  Organism: Blood Culture PCR (02-02-23 @ 21:27)  Organism: Blood Culture PCR (02-02-23 @ 21:27)      -  Staphylococcus epidermidis, Methicillin resistant: Detec      Method Type: PCR            INFECTIOUS DISEASES TESTING  Procalcitonin, Serum: 49.60 (02-02-23 @ 12:05)  MRSA PCR Result.: Negative (02-01-23 @ 10:42)  Procalcitonin, Serum: 37.20 (02-01-23 @ 10:42)  Legionella Antigen, Urine: Negative (02-01-23 @ 10:10)  Procalcitonin, Serum: 14.30 (02-01-23 @ 05:55)      INFLAMMATORY MARKERS  C-Reactive Protein, Serum: 42.3 mg/L (02-06-23 @ 10:43)  C-Reactive Protein, Serum: 107.1 mg/L (02-01-23 @ 10:42)      RADIOLOGY & ADDITIONAL TESTS:  I have personally reviewed the last available Chest xray  CXR      CT      CARDIOLOGY TESTING  12 Lead ECG:   Ventricular Rate 48 BPM    Atrial Rate 48 BPM    QRS Duration 117 ms    Q-T Interval 488 ms    QTC Calculation(Bazett) 436 ms    R Axis 95 degrees    T Axis 44 degrees    Diagnosis Line Atrial fibrillation with slow ventricular response  Rightward axis  Nonspecific T wave abnormality  Abnormal ECG    Confirmed by Stacy Blackburn MD ([id number]) on 2/5/2023 7:19:06 PM (02-05-23 @ 04:04)  12 Lead ECG:   Ventricular Rate 48 BPM    Atrial Rate 48 BPM    QRS Duration 108 ms    Q-T Interval 463 ms    QTC Calculation(Bazett) 414 ms    R Axis 100 degrees    T Axis 41 degrees    Diagnosis Line Atrial fibrillation with slow ventricular response  Rightwardaxis  Nonspecific T wave abnormality  Abnormal ECG    Confirmed by Sanjeev Pierce [id number]),  Stacy Blackburn MD ([id number]) on  2/4/2023 2:45:27 PM (02-04-23 @ 05:44)      MEDICATIONS  chlorhexidine 0.12% Liquid 15 Oral Mucosa every 12 hours  chlorhexidine 2% Cloths 1 Topical daily  dexAMETHasone  Injectable 6 IV Push daily  dexMEDEtomidine Infusion 0.2 IV Continuous <Continuous>  fentaNYL   Infusion. 0.5 IV Continuous <Continuous>  glucagon  Injectable 1 IntraMuscular once  heparin  Infusion. 2400 IV Continuous <Continuous>  insulin lispro (ADMELOG) corrective regimen sliding scale  SubCutaneous every 6 hours  meropenem  IVPB 500 IV Intermittent every 12 hours  norepinephrine Infusion 0.05 IV Continuous <Continuous>  polyethylene glycol 3350 17 Oral daily  senna 2 Oral at bedtime  sodium bicarbonate  Injectable 100 IV Push once      WEIGHT  Weight (kg): 110 (01-31-23 @ 19:58)  Creatinine, Serum: 2.1 mg/dL (02-06-23 @ 04:55)  Creatinine, Serum: 2.2 mg/dL (02-05-23 @ 20:55)      ANTIBIOTICS:  meropenem  IVPB 500 milliGRAM(s) IV Intermittent every 12 hours      All available historical records have been reviewed

## 2023-02-06 NOTE — PROGRESS NOTE ADULT - SUBJECTIVE AND OBJECTIVE BOX
Nephrology progress note    THIS IS AN INCOMPLETE NOTE . FULL NOTE TO FOLLOW SHORTLY    Patient is seen and examined, events over the last 24 h noted .    Allergies:  No Known Allergies    Hospital Medications:   MEDICATIONS  (STANDING):  chlorhexidine 0.12% Liquid 15 milliLiter(s) Oral Mucosa every 12 hours  chlorhexidine 2% Cloths 1 Application(s) Topical daily  dexAMETHasone  Injectable 6 milliGRAM(s) IV Push daily  dexMEDEtomidine Infusion 0.2 MICROgram(s)/kG/Hr (5.5 mL/Hr) IV Continuous <Continuous>  fentaNYL   Infusion. 0.5 MICROgram(s)/kG/Hr (5.5 mL/Hr) IV Continuous <Continuous>  glucagon  Injectable 1 milliGRAM(s) IntraMuscular once  heparin  Infusion. 2400 Unit(s)/Hr (24 mL/Hr) IV Continuous <Continuous>  insulin lispro (ADMELOG) corrective regimen sliding scale   SubCutaneous every 6 hours  meropenem  IVPB 500 milliGRAM(s) IV Intermittent every 12 hours  norepinephrine Infusion 0.05 MICROgram(s)/kG/Min (5.16 mL/Hr) IV Continuous <Continuous>  polyethylene glycol 3350 17 Gram(s) Oral daily  senna 2 Tablet(s) Oral at bedtime  sodium bicarbonate  Injectable 100 milliEquivalent(s) IV Push once        VITALS:  T(F): 97.9 (23 @ 08:00), Max: 98.4 (23 @ 16:00)  HR: 52 (23 @ 09:00)  BP: 126/52 (23 @ 09:00)  RR: 24 (23 @ 09:00)  SpO2: 97% (23 @ 09:00)  Wt(kg): --    :  -   @ 07:00  --------------------------------------------------------  IN: 2971.3 mL / OUT: 1950 mL / NET: 1021.3 mL     @ 07:01  -   @ 07:00  --------------------------------------------------------  IN: 3229.7 mL / OUT: 5275 mL / NET: -2045.3 mL     @ 07:01  -   @ 09:43  --------------------------------------------------------  IN: 308.2 mL / OUT: 500 mL / NET: -191.8 mL          PHYSICAL EXAM:  Constitutional: NAD  HEENT: anicteric sclera, oropharynx clear, MMM  Neck: No JVD  Respiratory: CTAB, no wheezes, rales or rhonchi  Cardiovascular: S1, S2, RRR  Gastrointestinal: BS+, soft, NT/ND  Extremities: No cyanosis or clubbing. No peripheral edema  :  No ya.   Skin: No rashes    LABS:      140  |  101  |  78<HH>  ----------------------------<  175<H>  3.7   |  29  |  2.1<H>    Creatinine Trend: 2.1<--, 2.2<--, 2.4<--, 2.6<--, 3.3<--, 3.6<--    Ca    10.8<H>      2023 04:55  Mg     1.9         TPro  5.0<L>  /  Alb  2.8<L>  /  TBili  0.4  /  DBili      /  AST  12  /  ALT  18  /  AlkPhos  133<H>                            9.1    22.93 )-----------( 175      ( 2023 04:55 )             27.8       Urine Studies:  Urinalysis Basic - ( 2023 20:15 )    Color: Orange / Appearance: Turbid / S.012 / pH:   Gluc:  / Ketone: Negative  / Bili: Negative / Urobili: <2 mg/dL   Blood:  / Protein: 300 mg/dL / Nitrite: Negative   Leuk Esterase: Large / RBC: 6 /HPF / WBC >720 /HPF   Sq Epi:  / Non Sq Epi: >27 /HPF / Bacteria: Many          Ferritin 347      [23 @ 10:42]  PTH -- (Ca --)      [23 @ 20:22]   470  Vitamin D (25OH) <6      [23 @ 20:22]  HbA1c 5.6      [19 @ 07:09]    HCV 0.08, Nonreact      [19 @ 07:37]        RADIOLOGY & ADDITIONAL STUDIES:   Nephrology progress note    Patient is seen and examined, events over the last 24 h noted .  Lying in bed comfortable on MV     Allergies:  No Known Allergies    Hospital Medications:   MEDICATIONS  (STANDING):  dexAMETHasone  Injectable 6 milliGRAM(s) IV Push daily  dexMEDEtomidine Infusion 0.2 MICROgram(s)/kG/Hr (5.5 mL/Hr) IV Continuous <Continuous>  fentaNYL   Infusion. 0.5 MICROgram(s)/kG/Hr (5.5 mL/Hr) IV Continuous <Continuous>  glucagon  Injectable 1 milliGRAM(s) IntraMuscular once  heparin  Infusion. 2400 Unit(s)/Hr (24 mL/Hr) IV Continuous <Continuous>  insulin lispro (ADMELOG) corrective regimen sliding scale   SubCutaneous every 6 hours  meropenem  IVPB 500 milliGRAM(s) IV Intermittent every 12 hours  norepinephrine Infusion 0.05 MICROgram(s)/kG/Min (5.16 mL/Hr) IV Continuous <Continuous>  polyethylene glycol 3350 17 Gram(s) Oral daily  senna 2 Tablet(s) Oral at bedtime  sodium bicarbonate  Injectable 100 milliEquivalent(s) IV Push once        VITALS:  T(F): 97.9 (23 @ 08:00), Max: 98.4 (23 @ 16:00)  HR: 52 (23 @ 09:00)  BP: 126/52 (23 @ 09:00)  RR: 24 (23 @ 09:00)  SpO2: 97% (23 @ 09:00)       @ 07:  -   @ 07:00  --------------------------------------------------------  IN: 2971.3 mL / OUT: 1950 mL / NET: 1021.3 mL     @ 07:01  -   @ 07:00  --------------------------------------------------------  IN: 3229.7 mL / OUT: 5275 mL / NET: -2045.3 mL     @ 07:01  -   @ 09:43  --------------------------------------------------------  IN: 308.2 mL / OUT: 500 mL / NET: -191.8 mL          PHYSICAL EXAM:  Constitutional: intubated on MV   Respiratory: CTAB,   Cardiovascular: S1, S2, RRR  Gastrointestinal: BS+, soft, NT/ND  Extremities: No cyanosis or clubbing. No peripheral edema  Skin: No rashes    LABS:        140  |  101  |  78<HH>  ----------------------------<  175<H>  3.7   |  29  |  2.1<H>    Creatinine Trend: 2.1<--, 2.2<--, 2.4<--, 2.6<--, 3.3<--, 3.6<--    Ca    10.8<H>      2023 04:55  Mg     1.9         TPro  5.0<L>  /  Alb  2.8<L>  /  TBili  0.4  /  DBili      /  AST  12  /  ALT  18  /  AlkPhos  133<H>                            9.1    22.93 )-----------( 175      ( 2023 04:55 )             27.8       Urine Studies:  Urinalysis Basic - ( 2023 20:15 )    Color: Orange / Appearance: Turbid / S.012 / pH:   Gluc:  / Ketone: Negative  / Bili: Negative / Urobili: <2 mg/dL   Blood:  / Protein: 300 mg/dL / Nitrite: Negative   Leuk Esterase: Large / RBC: 6 /HPF / WBC >720 /HPF   Sq Epi:  / Non Sq Epi: >27 /HPF / Bacteria: Many          Ferritin 347      [23 @ 10:42]  PTH -- (Ca --)      [23 @ 20:22]   470  Vitamin D (25OH) <6      [23 @ 20:22]  HbA1c 5.6      [12-20-19 @ 07:09]    HCV 0.08, Nonreact      [19 @ 07:37]        RADIOLOGY & ADDITIONAL STUDIES:

## 2023-02-07 LAB
ALBUMIN SERPL ELPH-MCNC: 2.6 G/DL — LOW (ref 3.5–5.2)
ALP SERPL-CCNC: 110 U/L — SIGNIFICANT CHANGE UP (ref 30–115)
ALT FLD-CCNC: 20 U/L — SIGNIFICANT CHANGE UP (ref 0–41)
ANION GAP SERPL CALC-SCNC: 10 MMOL/L — SIGNIFICANT CHANGE UP (ref 7–14)
APTT BLD: 95.8 SEC — CRITICAL HIGH (ref 27–39.2)
AST SERPL-CCNC: 11 U/L — SIGNIFICANT CHANGE UP (ref 0–41)
BASE EXCESS BLDA CALC-SCNC: 12.3 MMOL/L — HIGH (ref -2–3)
BASE EXCESS BLDA CALC-SCNC: 6.2 MMOL/L — HIGH (ref -2–3)
BASE EXCESS BLDA CALC-SCNC: 6.6 MMOL/L — HIGH (ref -2–3)
BASOPHILS # BLD AUTO: 0.03 K/UL — SIGNIFICANT CHANGE UP (ref 0–0.2)
BASOPHILS NFR BLD AUTO: 0.2 % — SIGNIFICANT CHANGE UP (ref 0–1)
BILIRUB SERPL-MCNC: 0.5 MG/DL — SIGNIFICANT CHANGE UP (ref 0.2–1.2)
BUN SERPL-MCNC: 80 MG/DL — CRITICAL HIGH (ref 10–20)
CALCIUM SERPL-MCNC: 10.9 MG/DL — HIGH (ref 8.4–10.5)
CHLORIDE SERPL-SCNC: 106 MMOL/L — SIGNIFICANT CHANGE UP (ref 98–110)
CO2 SERPL-SCNC: 30 MMOL/L — SIGNIFICANT CHANGE UP (ref 17–32)
CREAT SERPL-MCNC: 1.6 MG/DL — HIGH (ref 0.7–1.5)
EGFR: 34 ML/MIN/1.73M2 — LOW
EOSINOPHIL # BLD AUTO: 0.08 K/UL — SIGNIFICANT CHANGE UP (ref 0–0.7)
EOSINOPHIL NFR BLD AUTO: 0.5 % — SIGNIFICANT CHANGE UP (ref 0–8)
FERRITIN SERPL-MCNC: 271 NG/ML — HIGH (ref 15–150)
GAS PNL BLDA: SIGNIFICANT CHANGE UP
GLUCOSE BLDC GLUCOMTR-MCNC: 121 MG/DL — HIGH (ref 70–99)
GLUCOSE BLDC GLUCOMTR-MCNC: 136 MG/DL — HIGH (ref 70–99)
GLUCOSE BLDC GLUCOMTR-MCNC: 136 MG/DL — HIGH (ref 70–99)
GLUCOSE SERPL-MCNC: 112 MG/DL — HIGH (ref 70–99)
HCO3 BLDA-SCNC: 31 MMOL/L — HIGH (ref 21–28)
HCO3 BLDA-SCNC: 32 MMOL/L — HIGH (ref 21–28)
HCO3 BLDA-SCNC: 36 MMOL/L — HIGH (ref 21–28)
HCT VFR BLD CALC: 24.7 % — LOW (ref 37–47)
HGB BLD-MCNC: 8.1 G/DL — LOW (ref 12–16)
HOROWITZ INDEX BLDA+IHG-RTO: 40 — SIGNIFICANT CHANGE UP
IMM GRANULOCYTES NFR BLD AUTO: 3.1 % — HIGH (ref 0.1–0.3)
LYMPHOCYTES # BLD AUTO: 1.91 K/UL — SIGNIFICANT CHANGE UP (ref 1.2–3.4)
LYMPHOCYTES # BLD AUTO: 13 % — LOW (ref 20.5–51.1)
MAGNESIUM SERPL-MCNC: 1.6 MG/DL — LOW (ref 1.8–2.4)
MCHC RBC-ENTMCNC: 28.5 PG — SIGNIFICANT CHANGE UP (ref 27–31)
MCHC RBC-ENTMCNC: 32.8 G/DL — SIGNIFICANT CHANGE UP (ref 32–37)
MCV RBC AUTO: 87 FL — SIGNIFICANT CHANGE UP (ref 81–99)
MONOCYTES # BLD AUTO: 1.62 K/UL — HIGH (ref 0.1–0.6)
MONOCYTES NFR BLD AUTO: 11 % — HIGH (ref 1.7–9.3)
NEUTROPHILS # BLD AUTO: 10.58 K/UL — HIGH (ref 1.4–6.5)
NEUTROPHILS NFR BLD AUTO: 72.2 % — SIGNIFICANT CHANGE UP (ref 42.2–75.2)
NRBC # BLD: 0 /100 WBCS — SIGNIFICANT CHANGE UP (ref 0–0)
PCO2 BLDA: 41 MMHG — SIGNIFICANT CHANGE UP (ref 25–48)
PCO2 BLDA: 42 MMHG — SIGNIFICANT CHANGE UP (ref 25–48)
PCO2 BLDA: 50 MMHG — HIGH (ref 25–48)
PH BLDA: 7.42 — SIGNIFICANT CHANGE UP (ref 7.35–7.45)
PH BLDA: 7.47 — HIGH (ref 7.35–7.45)
PH BLDA: 7.55 — HIGH (ref 7.35–7.45)
PLATELET # BLD AUTO: 139 K/UL — SIGNIFICANT CHANGE UP (ref 130–400)
PO2 BLDA: 102 MMHG — SIGNIFICANT CHANGE UP (ref 83–108)
PO2 BLDA: 85 MMHG — SIGNIFICANT CHANGE UP (ref 83–108)
PO2 BLDA: 89 MMHG — SIGNIFICANT CHANGE UP (ref 83–108)
POTASSIUM SERPL-MCNC: 3.7 MMOL/L — SIGNIFICANT CHANGE UP (ref 3.5–5)
POTASSIUM SERPL-SCNC: 3.7 MMOL/L — SIGNIFICANT CHANGE UP (ref 3.5–5)
PROCALCITONIN SERPL-MCNC: 2.82 NG/ML — HIGH (ref 0.02–0.1)
PROT SERPL-MCNC: 4.5 G/DL — LOW (ref 6–8)
RBC # BLD: 2.84 M/UL — LOW (ref 4.2–5.4)
RBC # FLD: 13.4 % — SIGNIFICANT CHANGE UP (ref 11.5–14.5)
SAO2 % BLDA: 96.2 % — SIGNIFICANT CHANGE UP (ref 94–98)
SAO2 % BLDA: 96.6 % — SIGNIFICANT CHANGE UP (ref 94–98)
SAO2 % BLDA: 96.8 % — SIGNIFICANT CHANGE UP (ref 94–98)
SODIUM SERPL-SCNC: 146 MMOL/L — SIGNIFICANT CHANGE UP (ref 135–146)
T3FREE SERPL-MCNC: 1.23 PG/ML — LOW (ref 2–4.4)
WBC # BLD: 14.68 K/UL — HIGH (ref 4.8–10.8)
WBC # FLD AUTO: 14.68 K/UL — HIGH (ref 4.8–10.8)

## 2023-02-07 PROCEDURE — 93010 ELECTROCARDIOGRAM REPORT: CPT | Mod: 77

## 2023-02-07 PROCEDURE — 99252 IP/OBS CONSLTJ NEW/EST SF 35: CPT

## 2023-02-07 PROCEDURE — 71045 X-RAY EXAM CHEST 1 VIEW: CPT | Mod: 26

## 2023-02-07 PROCEDURE — 99291 CRITICAL CARE FIRST HOUR: CPT

## 2023-02-07 PROCEDURE — 99233 SBSQ HOSP IP/OBS HIGH 50: CPT

## 2023-02-07 PROCEDURE — 93010 ELECTROCARDIOGRAM REPORT: CPT

## 2023-02-07 RX ORDER — LABETALOL HCL 100 MG
100 TABLET ORAL ONCE
Refills: 0 | Status: COMPLETED | OUTPATIENT
Start: 2023-02-07 | End: 2023-02-07

## 2023-02-07 RX ORDER — FUROSEMIDE 40 MG
40 TABLET ORAL ONCE
Refills: 0 | Status: COMPLETED | OUTPATIENT
Start: 2023-02-07 | End: 2023-02-07

## 2023-02-07 RX ORDER — MEROPENEM 1 G/30ML
1000 INJECTION INTRAVENOUS EVERY 12 HOURS
Refills: 0 | Status: DISCONTINUED | OUTPATIENT
Start: 2023-02-07 | End: 2023-02-08

## 2023-02-07 RX ORDER — LOSARTAN POTASSIUM 100 MG/1
50 TABLET, FILM COATED ORAL DAILY
Refills: 0 | Status: DISCONTINUED | OUTPATIENT
Start: 2023-02-07 | End: 2023-02-08

## 2023-02-07 RX ORDER — NIFEDIPINE 30 MG
90 TABLET, EXTENDED RELEASE 24 HR ORAL DAILY
Refills: 0 | Status: DISCONTINUED | OUTPATIENT
Start: 2023-02-07 | End: 2023-02-07

## 2023-02-07 RX ORDER — MEROPENEM 1 G/30ML
500 INJECTION INTRAVENOUS EVERY 12 HOURS
Refills: 0 | Status: DISCONTINUED | OUTPATIENT
Start: 2023-02-07 | End: 2023-02-07

## 2023-02-07 RX ORDER — MAGNESIUM SULFATE 500 MG/ML
2 VIAL (ML) INJECTION
Refills: 0 | Status: COMPLETED | OUTPATIENT
Start: 2023-02-07 | End: 2023-02-07

## 2023-02-07 RX ADMIN — CHLORHEXIDINE GLUCONATE 1 APPLICATION(S): 213 SOLUTION TOPICAL at 05:55

## 2023-02-07 RX ADMIN — Medication 25 GRAM(S): at 09:37

## 2023-02-07 RX ADMIN — Medication 40 MILLIGRAM(S): at 09:16

## 2023-02-07 RX ADMIN — MEROPENEM 100 MILLIGRAM(S): 1 INJECTION INTRAVENOUS at 17:55

## 2023-02-07 RX ADMIN — CHLORHEXIDINE GLUCONATE 15 MILLILITER(S): 213 SOLUTION TOPICAL at 05:52

## 2023-02-07 RX ADMIN — LOSARTAN POTASSIUM 50 MILLIGRAM(S): 100 TABLET, FILM COATED ORAL at 09:16

## 2023-02-07 RX ADMIN — FENTANYL CITRATE 5.5 MICROGRAM(S)/KG/HR: 50 INJECTION INTRAVENOUS at 08:15

## 2023-02-07 RX ADMIN — DEXMEDETOMIDINE HYDROCHLORIDE IN 0.9% SODIUM CHLORIDE 5.5 MICROGRAM(S)/KG/HR: 4 INJECTION INTRAVENOUS at 08:15

## 2023-02-07 RX ADMIN — MEROPENEM 100 MILLIGRAM(S): 1 INJECTION INTRAVENOUS at 05:53

## 2023-02-07 RX ADMIN — Medication 25 GRAM(S): at 08:15

## 2023-02-07 RX ADMIN — Medication 100 MILLIGRAM(S): at 21:14

## 2023-02-07 RX ADMIN — Medication 6 MILLIGRAM(S): at 05:52

## 2023-02-07 NOTE — CONSULT NOTE ADULT - ASSESSMENT
Assessment:  Patient  received in bed vented , mostly bedbound incontinence to stool and urine                          High risk for pressure injury development or progression   Skin assessed-  Wound #1  Type and location :  ??Deep tissue pressure injury to B/l buttock   Size: L~5x4 L ~4x4  Tissue Description :  Intact dark red skin tissue   Wound Exudate : none    Wound Edge: Intact   Opal wound condition - dry     Plan:  Wound and skin care recs.  Continue skin barrier to B/L buttock - monitor deep tissue pressure injury for progression   Pressure injury  preventive  measures  skin  and incontinence care   Assess wound and inform primary provider of any changes   Case discussed with primary Rn   Wound/ ostomy specialist  to f/u as needed     Offloading: [x ] Frequent position changes [x ] Devices/Equipment  Cleansing: [ ] Saline [x ] Soap/Water [ ] Other: ______  Topicals: [ x] Barrier Cream [ ] Antimicrobial [ ] Enzymatic Wound Debridement  Dressings: [ ] Dry, sterile [ ] Allevyn  Foam [ ] Absorbant Pads [ ] Collagenase    Other Recs.    Per primary team      Total time for bedside assessment , review of medical records  and  discussion of plan of care with primary team greater than 35 min

## 2023-02-07 NOTE — PROGRESS NOTE ADULT - SUBJECTIVE AND OBJECTIVE BOX
Patient is a 72y old  Female who presents with a chief complaint of respiratory distress (07 Feb 2023 08:23)      INTERVAL HPI/OVERNIGHT EVENTS:   No overnight events   Afebrile, hemodynamically stable     ICU Vital Signs Last 24 Hrs  T(C): 37 (07 Feb 2023 04:00), Max: 37.1 (06 Feb 2023 12:00)  T(F): 98.6 (07 Feb 2023 04:00), Max: 98.8 (06 Feb 2023 12:00)  HR: 72 (07 Feb 2023 08:15) (44 - 86)  BP: 160/106 (07 Feb 2023 07:00) (104/54 - 160/106)  BP(mean): 143 (07 Feb 2023 07:00) (58 - 143)  ABP: --  ABP(mean): --  RR: 25 (07 Feb 2023 07:00) (23 - 37)  SpO2: 95% (07 Feb 2023 08:15) (95% - 100%)    O2 Parameters below as of 07 Feb 2023 04:00  Patient On (Oxygen Delivery Method): ventilator    O2 Concentration (%): 40      I&O's Summary    06 Feb 2023 07:01  -  07 Feb 2023 07:00  --------------------------------------------------------  IN: 2560 mL / OUT: 4040 mL / NET: -1480 mL      Mode: CPAP with PS  FiO2: 40  PEEP: 8  PS: 10      LABS:                        8.1    14.68 )-----------( 139      ( 07 Feb 2023 05:00 )             24.7     02-07    146  |  106  |  80<HH>  ----------------------------<  112<H>  3.7   |  30  |  1.6<H>    Ca    10.9<H>      07 Feb 2023 05:00  Mg     1.6     02-07    TPro  4.5<L>  /  Alb  2.6<L>  /  TBili  0.5  /  DBili  x   /  AST  11  /  ALT  20  /  AlkPhos  110  02-07    PT/INR - ( 06 Feb 2023 10:43 )   PT: 12.00 sec;   INR: 1.05 ratio         PTT - ( 07 Feb 2023 05:00 )  PTT:95.8 sec    CAPILLARY BLOOD GLUCOSE      POCT Blood Glucose.: 150 mg/dL (06 Feb 2023 17:06)  POCT Blood Glucose.: 211 mg/dL (06 Feb 2023 10:18)    ABG - ( 07 Feb 2023 02:38 )  pH, Arterial: 7.47  pH, Blood: x     /  pCO2: 42    /  pO2: 102   / HCO3: 31    / Base Excess: 6.2   /  SaO2: 96.8                RADIOLOGY & ADDITIONAL TESTS:    Consultant(s) Notes Reviewed:  [x ] YES  [ ] NO    MEDICATIONS  (STANDING):  chlorhexidine 0.12% Liquid 15 milliLiter(s) Oral Mucosa every 12 hours  chlorhexidine 2% Cloths 1 Application(s) Topical daily  dexAMETHasone  Injectable 6 milliGRAM(s) IV Push daily  dexMEDEtomidine Infusion 0.2 MICROgram(s)/kG/Hr (5.5 mL/Hr) IV Continuous <Continuous>  fentaNYL   Infusion. 0.5 MICROgram(s)/kG/Hr (5.5 mL/Hr) IV Continuous <Continuous>  glucagon  Injectable 1 milliGRAM(s) IntraMuscular once  heparin  Infusion. 2400 Unit(s)/Hr (24 mL/Hr) IV Continuous <Continuous>  insulin lispro (ADMELOG) corrective regimen sliding scale   SubCutaneous every 6 hours  losartan 50 milliGRAM(s) Oral daily  meropenem  IVPB 500 milliGRAM(s) IV Intermittent every 24 hours  pantoprazole   Suspension 40 milliGRAM(s) Oral daily  polyethylene glycol 3350 17 Gram(s) Oral daily  senna 2 Tablet(s) Oral at bedtime  sodium bicarbonate  Injectable 100 milliEquivalent(s) IV Push once    MEDICATIONS  (PRN):  acetaminophen  Suppository .. 650 milliGRAM(s) Rectal every 4 hours PRN Temp greater or equal to 38.5C (101.3F)  heparin   Injectable 9000 Unit(s) IV Push every 6 hours PRN For aPTT less than 40  heparin   Injectable 4000 Unit(s) IV Push every 6 hours PRN For aPTT between 40 - 57      PHYSICAL EXAM:  GENERAL: intubated, seated  HEAD:  Atraumatic, Normocephalic  EYES: EOMI, PERRLA, conjunctiva and sclera clear  NECK: Supple, No JVD, Normal thyroid, no enlarged nodes  NERVOUS SYSTEM:  Alert & Awake. Follows commands  CHEST/LUNG: B/L good air entry; No rales, rhonchi, or wheezing  HEART: S1S2 normal, no S3, Regular rate and rhythm; No murmurs  ABDOMEN: Obese, Soft, Nontender, Nondistended; Bowel sounds present  EXTREMITIES:  2+ Peripheral Pulses, No clubbing, cyanosis, or edema  LYMPH: No lymphadenopathy noted  SKIN: No rashes or lesions    Care Discussed with Consultants/Other Providers [ x] YES  [ ] NO Patient is a 72y old  Female who presents with a chief complaint of respiratory distress (07 Feb 2023 08:23)    Hospital day: 7     INTERVAL HPI/OVERNIGHT EVENTS:   No overnight events   Afebrile, hemodynamically stable     ICU Vital Signs Last 24 Hrs  T(C): 37 (07 Feb 2023 04:00), Max: 37.1 (06 Feb 2023 12:00)  T(F): 98.6 (07 Feb 2023 04:00), Max: 98.8 (06 Feb 2023 12:00)  HR: 72 (07 Feb 2023 08:15) (44 - 86)  BP: 160/106 (07 Feb 2023 07:00) (104/54 - 160/106)  BP(mean): 143 (07 Feb 2023 07:00) (58 - 143)  ABP: --  ABP(mean): --  RR: 25 (07 Feb 2023 07:00) (23 - 37)  SpO2: 95% (07 Feb 2023 08:15) (95% - 100%)    O2 Parameters below as of 07 Feb 2023 04:00  Patient On (Oxygen Delivery Method): ventilator    O2 Concentration (%): 40      I&O's Summary    06 Feb 2023 07:01  -  07 Feb 2023 07:00  --------------------------------------------------------  IN: 2560 mL / OUT: 4040 mL / NET: -1480 mL      Mode: CPAP with PS  FiO2: 40  PEEP: 8  PS: 10      LABS:                        8.1    14.68 )-----------( 139      ( 07 Feb 2023 05:00 )             24.7     02-07    146  |  106  |  80<HH>  ----------------------------<  112<H>  3.7   |  30  |  1.6<H>    Ca    10.9<H>      07 Feb 2023 05:00  Mg     1.6     02-07    TPro  4.5<L>  /  Alb  2.6<L>  /  TBili  0.5  /  DBili  x   /  AST  11  /  ALT  20  /  AlkPhos  110  02-07    PT/INR - ( 06 Feb 2023 10:43 )   PT: 12.00 sec;   INR: 1.05 ratio         PTT - ( 07 Feb 2023 05:00 )  PTT:95.8 sec    CAPILLARY BLOOD GLUCOSE      POCT Blood Glucose.: 150 mg/dL (06 Feb 2023 17:06)  POCT Blood Glucose.: 211 mg/dL (06 Feb 2023 10:18)    ABG - ( 07 Feb 2023 02:38 )  pH, Arterial: 7.47  pH, Blood: x     /  pCO2: 42    /  pO2: 102   / HCO3: 31    / Base Excess: 6.2   /  SaO2: 96.8                RADIOLOGY & ADDITIONAL TESTS:    Consultant(s) Notes Reviewed:  [x ] YES  [ ] NO    MEDICATIONS  (STANDING):  chlorhexidine 0.12% Liquid 15 milliLiter(s) Oral Mucosa every 12 hours  chlorhexidine 2% Cloths 1 Application(s) Topical daily  dexAMETHasone  Injectable 6 milliGRAM(s) IV Push daily  dexMEDEtomidine Infusion 0.2 MICROgram(s)/kG/Hr (5.5 mL/Hr) IV Continuous <Continuous>  fentaNYL   Infusion. 0.5 MICROgram(s)/kG/Hr (5.5 mL/Hr) IV Continuous <Continuous>  glucagon  Injectable 1 milliGRAM(s) IntraMuscular once  heparin  Infusion. 2400 Unit(s)/Hr (24 mL/Hr) IV Continuous <Continuous>  insulin lispro (ADMELOG) corrective regimen sliding scale   SubCutaneous every 6 hours  losartan 50 milliGRAM(s) Oral daily  meropenem  IVPB 500 milliGRAM(s) IV Intermittent every 24 hours  pantoprazole   Suspension 40 milliGRAM(s) Oral daily  polyethylene glycol 3350 17 Gram(s) Oral daily  senna 2 Tablet(s) Oral at bedtime  sodium bicarbonate  Injectable 100 milliEquivalent(s) IV Push once    MEDICATIONS  (PRN):  acetaminophen  Suppository .. 650 milliGRAM(s) Rectal every 4 hours PRN Temp greater or equal to 38.5C (101.3F)  heparin   Injectable 9000 Unit(s) IV Push every 6 hours PRN For aPTT less than 40  heparin   Injectable 4000 Unit(s) IV Push every 6 hours PRN For aPTT between 40 - 57      PHYSICAL EXAM:  GENERAL: intubated, seated  HEAD:  Atraumatic, Normocephalic  EYES: EOMI, PERRLA, conjunctiva and sclera clear  NECK: Supple, No JVD, Normal thyroid, no enlarged nodes  NERVOUS SYSTEM:  Alert & Awake. Follows commands  CHEST/LUNG: B/L good air entry; No rales, rhonchi, or wheezing  HEART: S1S2 normal, no S3, Regular rate and rhythm; No murmurs  ABDOMEN: Obese, Soft, Nontender, Nondistended; Bowel sounds present  EXTREMITIES:  2+ Peripheral Pulses, No clubbing, cyanosis, or edema  LYMPH: No lymphadenopathy noted  SKIN: No rashes or lesions    Care Discussed with Consultants/Other Providers [ x] YES  [ ] NO Patient is a 72y old  Female who presents with a chief complaint of respiratory distress (07 Feb 2023 08:23)    Hospital day: 7     INTERVAL HPI/OVERNIGHT EVENTS:   No overnight events   Afebrile, hemodynamically stable     ICU Vital Signs Last 24 Hrs  T(C): 37 (07 Feb 2023 04:00), Max: 37.1 (06 Feb 2023 12:00)  T(F): 98.6 (07 Feb 2023 04:00), Max: 98.8 (06 Feb 2023 12:00)  HR: 72 (07 Feb 2023 08:15) (44 - 86)  BP: 160/106 (07 Feb 2023 07:00) (104/54 - 160/106)  BP(mean): 143 (07 Feb 2023 07:00) (58 - 143)  ABP: --  ABP(mean): --  RR: 25 (07 Feb 2023 07:00) (23 - 37)  SpO2: 95% (07 Feb 2023 08:15) (95% - 100%)    O2 Parameters below as of 07 Feb 2023 04:00  Patient On (Oxygen Delivery Method): ventilator    O2 Concentration (%): 40      I&O's Summary    06 Feb 2023 07:01  -  07 Feb 2023 07:00  --------------------------------------------------------  IN: 2560 mL / OUT: 4040 mL / NET: -1480 mL      Mode: CPAP with PS  FiO2: 40  PEEP: 8  PS: 10      LABS:                        8.1    14.68 )-----------( 139      ( 07 Feb 2023 05:00 )             24.7     02-07    146  |  106  |  80<HH>  ----------------------------<  112<H>  3.7   |  30  |  1.6<H>    Ca    10.9<H>      07 Feb 2023 05:00  Mg     1.6     02-07    TPro  4.5<L>  /  Alb  2.6<L>  /  TBili  0.5  /  DBili  x   /  AST  11  /  ALT  20  /  AlkPhos  110  02-07    PT/INR - ( 06 Feb 2023 10:43 )   PT: 12.00 sec;   INR: 1.05 ratio         PTT - ( 07 Feb 2023 05:00 )  PTT:95.8 sec    CAPILLARY BLOOD GLUCOSE      POCT Blood Glucose.: 150 mg/dL (06 Feb 2023 17:06)  POCT Blood Glucose.: 211 mg/dL (06 Feb 2023 10:18)    ABG - ( 07 Feb 2023 02:38 )  pH, Arterial: 7.47  pH, Blood: x     /  pCO2: 42    /  pO2: 102   / HCO3: 31    / Base Excess: 6.2   /  SaO2: 96.8                RADIOLOGY & ADDITIONAL TESTS:    Consultant(s) Notes Reviewed:  [x ] YES  [ ] NO    MEDICATIONS  (STANDING):  chlorhexidine 0.12% Liquid 15 milliLiter(s) Oral Mucosa every 12 hours  chlorhexidine 2% Cloths 1 Application(s) Topical daily  dexAMETHasone  Injectable 6 milliGRAM(s) IV Push daily  dexMEDEtomidine Infusion 0.2 MICROgram(s)/kG/Hr (5.5 mL/Hr) IV Continuous <Continuous>  fentaNYL   Infusion. 0.5 MICROgram(s)/kG/Hr (5.5 mL/Hr) IV Continuous <Continuous>  glucagon  Injectable 1 milliGRAM(s) IntraMuscular once  heparin  Infusion. 2400 Unit(s)/Hr (24 mL/Hr) IV Continuous <Continuous>  insulin lispro (ADMELOG) corrective regimen sliding scale   SubCutaneous every 6 hours  losartan 50 milliGRAM(s) Oral daily  meropenem  IVPB 500 milliGRAM(s) IV Intermittent every 24 hours  pantoprazole   Suspension 40 milliGRAM(s) Oral daily  polyethylene glycol 3350 17 Gram(s) Oral daily  senna 2 Tablet(s) Oral at bedtime  sodium bicarbonate  Injectable 100 milliEquivalent(s) IV Push once    MEDICATIONS  (PRN):  acetaminophen  Suppository .. 650 milliGRAM(s) Rectal every 4 hours PRN Temp greater or equal to 38.5C (101.3F)  heparin   Injectable 9000 Unit(s) IV Push every 6 hours PRN For aPTT less than 40  heparin   Injectable 4000 Unit(s) IV Push every 6 hours PRN For aPTT between 40 - 57      PHYSICAL EXAM:  GENERAL: intubated, seated  HEAD:  Atraumatic, Normocephalic  EYES: EOMI, PERRLA, conjunctiva and sclera clear  NECK: Supple, No JVD, Normal thyroid, no enlarged nodes  NERVOUS SYSTEM:  Alert & Awake. Follows commands  CHEST/LUNG: B/L good air entry; No rales, rhonchi, or wheezing  HEART: S1S2 normal, no S3, irregularly irregular rhythm; Bradycardia; No murmurs  ABDOMEN: Obese, Soft, Nontender, Nondistended; Bowel sounds present  EXTREMITIES:  2+ Peripheral Pulses, No clubbing, cyanosis, or edema  LYMPH: No lymphadenopathy noted  SKIN: No rashes or lesions    Care Discussed with Consultants/Other Providers [ x] YES  [ ] NO

## 2023-02-07 NOTE — PROGRESS NOTE ADULT - ASSESSMENT
PLAN    #Acute hypoxemic resp failure still intubated on 50%/ 16  #ARDS  #Sepsis POA  #Possible CAP  #COVID +  - MRSA (-), Strep (-), Legionella (-) on 2/1   - Covid + on 1/31/22  - CT Chest No Cont (01.31.23 @ 21:17):  Mild right pleural effusion with adjacent  atelectasis. Bibasilar atelectatic changes versus scarring. Partial obstruction of the distal right lower lobe bronchioles. Otherwise no  evidence of central endobronchial obstruction. No focal consolidation.  Right upper lobe pulmonary cyst (4-130). No pleural effusion. No  pneumothorax.  - Xray Chest 1 View-PORTABLE IMMEDIATE (Xray Chest 1 View-PORTABLE IMMEDIATE .) (02.01.23 @ 06:31):  Worsening opacifications and effusions. Support devices as described.  - No RDV given as patient was out of the window and had NOLA earlier in the hospital course   - c/w dexamethasone 6 mg q24h ( 2/1 -2/2, 2/4- current) for 10 days   - No TOCI given as patient had positive blood cultures   - D-Dimer 818, Ferritin 347, LDH (203 --> 139), CRP (107.1 --> 42), procal ( 14.3 -->37.2 --> 49.6)   - daily CXR  - daily ABG       #Septic shock  #ESBL UTI  #G+C bacteremia/ COAG - STAP  - UA 1/31 showed LE+, WBC+, Bacteria+, Epithelial cells 27+  - UCx: proteus mirabilis ESBL   - BCx: staph epidermidis mehicillin resistant ( most likely contaminanant as per ID)   - s/p Levaquin ( 1/31 - 2/3), Rocephin ( 2/1-2/3),   - c/w on Meropenem 500 mg q24h ( 2/3 - current)   - ID following     #NOLA  likely ATN vs prerenal improving  #H/O congenital unilateral kidney  #Hypercalcemia   - Renal US 2/4/23: No right hydronephrosis; left kidney congenitally absent   - Creat trending down ( 5.1 --> 2.1 --> 1.6)   - strict i/o  - Corrected Ca around 11/ PTH mediated; needs hypercalcemia workup once extubated   - no need for RRT at this time   - Renally dose medications   - nephrology following   - Lasix 40 IV once 2/7    #H/O A Fib  - rate controlled   - currently on a heparin drip for anticoagulation   - monitor pTT     #Low TSH; 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

## 2023-02-07 NOTE — PROGRESS NOTE ADULT - SUBJECTIVE AND OBJECTIVE BOX
seen and examined  24 h events noted   intubated/ventilated         PAST HISTORY  --------------------------------------------------------------------------------  No significant changes to PMH, PSH, FHx, SHx, unless otherwise noted    ALLERGIES & MEDICATIONS  --------------------------------------------------------------------------------  Allergies    No Known Allergies    Intolerances      Standing Inpatient Medications  chlorhexidine 0.12% Liquid 15 milliLiter(s) Oral Mucosa every 12 hours  chlorhexidine 2% Cloths 1 Application(s) Topical daily  dexAMETHasone  Injectable 6 milliGRAM(s) IV Push daily  dexMEDEtomidine Infusion 0.2 MICROgram(s)/kG/Hr IV Continuous <Continuous>  fentaNYL   Infusion. 0.5 MICROgram(s)/kG/Hr IV Continuous <Continuous>  furosemide   Injectable 40 milliGRAM(s) IV Push once  glucagon  Injectable 1 milliGRAM(s) IntraMuscular once  heparin  Infusion. 2400 Unit(s)/Hr IV Continuous <Continuous>  insulin lispro (ADMELOG) corrective regimen sliding scale   SubCutaneous every 6 hours  magnesium sulfate  IVPB 2 Gram(s) IV Intermittent every 2 hours  meropenem  IVPB 500 milliGRAM(s) IV Intermittent every 24 hours  NIFEdipine XL 90 milliGRAM(s) Oral daily  norepinephrine Infusion 0.05 MICROgram(s)/kG/Min IV Continuous <Continuous>  pantoprazole   Suspension 40 milliGRAM(s) Oral daily  polyethylene glycol 3350 17 Gram(s) Oral daily  senna 2 Tablet(s) Oral at bedtime  sodium bicarbonate  Injectable 100 milliEquivalent(s) IV Push once    PRN Inpatient Medications  acetaminophen  Suppository .. 650 milliGRAM(s) Rectal every 4 hours PRN  heparin   Injectable 9000 Unit(s) IV Push every 6 hours PRN  heparin   Injectable 4000 Unit(s) IV Push every 6 hours PRN          VITALS/PHYSICAL EXAM  --------------------------------------------------------------------------------  T(C): 37 (02-07-23 @ 04:00), Max: 37.1 (02-06-23 @ 12:00)  HR: 46 (02-07-23 @ 07:00) (44 - 86)  BP: 160/106 (02-07-23 @ 07:00) (104/54 - 160/106)  RR: 25 (02-07-23 @ 07:00) (23 - 38)  SpO2: 100% (02-07-23 @ 07:00) (97% - 100%)  Wt(kg): --        02-06-23 @ 07:01  -  02-07-23 @ 07:00  --------------------------------------------------------  IN: 2560 mL / OUT: 4040 mL / NET: -1480 mL      Physical Exam:  	Gen: intubated/ventilated   	Pulm: B/L osman   	CV: S1S2; no rub  	Abd: +distended      LABS/STUDIES  --------------------------------------------------------------------------------              8.1    14.68 >-----------<  139      [02-07-23 @ 05:00]              24.7     146  |  106  |  80  ----------------------------<  112      [02-07-23 @ 05:00]  3.7   |  30  |  1.6        Ca     10.9     [02-07-23 @ 05:00]      Mg     1.6     [02-07-23 @ 05:00]    TPro  4.5  /  Alb  2.6  /  TBili  0.5  /  DBili  x   /  AST  11  /  ALT  20  /  AlkPhos  110  [02-07-23 @ 05:00]    PT/INR: PT 12.00, INR 1.05       [02-06-23 @ 10:43]  PTT: 95.8       [02-07-23 @ 05:00]          [02-06-23 @ 10:43]    Creatinine Trend:  SCr 1.6 [02-07 @ 05:00]  SCr 2.1 [02-06 @ 04:55]  SCr 2.2 [02-05 @ 20:55]  SCr 2.4 [02-05 @ 11:45]  SCr 2.6 [02-05 @ 04:20]    Urinalysis - [01-31-23 @ 20:15]      Color Orange / Appearance Turbid / SG 1.012 / pH 6.5      Gluc Negative / Ketone Negative  / Bili Negative / Urobili <2 mg/dL       Blood Small / Protein 300 mg/dL / Leuk Est Large / Nitrite Negative      RBC 6 / WBC >720 / Hyaline >145 / Gran  / Sq Epi  / Non Sq Epi >27 / Bacteria Many      Ferritin 271      [02-06-23 @ 10:43]  PTH -- (Ca --)      [02-04-23 @ 20:22]   470  Vitamin D (25OH) <6      [02-04-23 @ 20:22]  HbA1c 5.6      [12-20-19 @ 07:09]  TSH 0.15      [02-06-23 @ 10:43]

## 2023-02-07 NOTE — PROGRESS NOTE ADULT - ASSESSMENT
HPI: 71 yo female with afib, htn, solitary kidney presented from [facility] for COVID pneumonia. She was also SOB but she refused to visit the hospital. She presented to the ed and was placed on bipap with no improvement so she was intubated. She tested positive for covid, and her ct scan showed mild right pleural effusion. Admitted to MICU with AHRF, septic shock.  Currently patient is intubated, ventilated, on Levophed, sedated and on heparin infusion. Palliative consulted for high LACE score.     Patient is extubated to Bipap today, complaining of being cold. Blankets were provided.   Introduced palliative to son and dtr. Plan to follow along and re-address GOC as needed. Plan for ongoing medical management for now.     MEDD (morphine equivalent daily dose): 0      See Recs below.    Please call [phone] with questions or concerns 24/7.   We will continue to follow.     Discussed with primary MD.

## 2023-02-07 NOTE — PROGRESS NOTE ADULT - SUBJECTIVE AND OBJECTIVE BOX
Over Night Events: events noted, remain critically ill, still intubated, ventilated, on fentanyl, precedex, hep , off levophed    PHYSICAL EXAM    ICU Vital Signs Last 24 Hrs  T(C): 37 (07 Feb 2023 04:00), Max: 37.1 (06 Feb 2023 12:00)  T(F): 98.6 (07 Feb 2023 04:00), Max: 98.8 (06 Feb 2023 12:00)  HR: 46 (07 Feb 2023 07:00) (44 - 86)  BP: 160/106 (07 Feb 2023 07:00) (104/54 - 160/106)  BP(mean): 143 (07 Feb 2023 07:00) (58 - 143)  RR: 25 (07 Feb 2023 07:00) (23 - 38)  SpO2: 100% (07 Feb 2023 07:00) (97% - 100%)    O2 Parameters below as of 07 Feb 2023 04:00  Patient On (Oxygen Delivery Method): ventilator    O2 Concentration (%): 40        General: ill looking  HEENT: ETT  Lungs: Dec bs both bases  Cardiovascular: Regular   Abdomen: Soft, Positive BS  Extremities: No clubbing   Neurological: Non focal       02-06-23 @ 07:01  -  02-07-23 @ 07:00  --------------------------------------------------------  IN:    Dexmedetomidine: 107.3 mL    FentaNYL: 522.5 mL    Heparin Infusion: 432 mL    IV PiggyBack: 50 mL    Norepinephrine: 8.2 mL    Peptamen A.F.: 1440 mL  Total IN: 2560 mL    OUT:    Indwelling Catheter - Urethral (mL): 2740 mL    Norepinephrine: 0 mL    Rectal Tube (mL): 1300 mL  Total OUT: 4040 mL    Total NET: -1480 mL          LABS:                          8.1    14.68 )-----------( 139      ( 07 Feb 2023 05:00 )             24.7                                               02-07    146  |  106  |  80<HH>  ----------------------------<  112<H>  3.7   |  30  |  1.6<H>    Ca    10.9<H>      07 Feb 2023 05:00  Mg     1.6     02-07    TPro  4.5<L>  /  Alb  2.6<L>  /  TBili  0.5  /  DBili  x   /  AST  11  /  ALT  20  /  AlkPhos  110  02-07      PT/INR - ( 06 Feb 2023 10:43 )   PT: 12.00 sec;   INR: 1.05 ratio         PTT - ( 07 Feb 2023 05:00 )  PTT:95.8 sec                                                                                     LIVER FUNCTIONS - ( 07 Feb 2023 05:00 )  Alb: 2.6 g/dL / Pro: 4.5 g/dL / ALK PHOS: 110 U/L / ALT: 20 U/L / AST: 11 U/L / GGT: x                                                                                               Mode: AC/ CMV (Assist Control/ Continuous Mandatory Ventilation)  RR (machine): 24  TV (machine): 370  FiO2: 40  PEEP: 12  ITime: 1  MAP: 17  PIP: 30                                      ABG - ( 07 Feb 2023 02:38 )  pH, Arterial: 7.47  pH, Blood: x     /  pCO2: 42    /  pO2: 102   / HCO3: 31    / Base Excess: 6.2   /  SaO2: 96.8                MEDICATIONS  (STANDING):  chlorhexidine 0.12% Liquid 15 milliLiter(s) Oral Mucosa every 12 hours  chlorhexidine 2% Cloths 1 Application(s) Topical daily  dexAMETHasone  Injectable 6 milliGRAM(s) IV Push daily  dexMEDEtomidine Infusion 0.2 MICROgram(s)/kG/Hr (5.5 mL/Hr) IV Continuous <Continuous>  fentaNYL   Infusion. 0.5 MICROgram(s)/kG/Hr (5.5 mL/Hr) IV Continuous <Continuous>  glucagon  Injectable 1 milliGRAM(s) IntraMuscular once  heparin  Infusion. 2400 Unit(s)/Hr (24 mL/Hr) IV Continuous <Continuous>  insulin lispro (ADMELOG) corrective regimen sliding scale   SubCutaneous every 6 hours  magnesium sulfate  IVPB 2 Gram(s) IV Intermittent every 2 hours  meropenem  IVPB 500 milliGRAM(s) IV Intermittent every 24 hours  norepinephrine Infusion 0.05 MICROgram(s)/kG/Min (5.16 mL/Hr) IV Continuous <Continuous>  pantoprazole   Suspension 40 milliGRAM(s) Oral daily  polyethylene glycol 3350 17 Gram(s) Oral daily  senna 2 Tablet(s) Oral at bedtime  sodium bicarbonate  Injectable 100 milliEquivalent(s) IV Push once    MEDICATIONS  (PRN):  acetaminophen  Suppository .. 650 milliGRAM(s) Rectal every 4 hours PRN Temp greater or equal to 38.5C (101.3F)  heparin   Injectable 9000 Unit(s) IV Push every 6 hours PRN For aPTT less than 40  heparin   Injectable 4000 Unit(s) IV Push every 6 hours PRN For aPTT between 40 - 57    CXR reviewed

## 2023-02-07 NOTE — PROGRESS NOTE ADULT - SUBJECTIVE AND OBJECTIVE BOX
HPI:    HPI: 71 yo female with afib, htn, solitary kidney presented from [facility] for COVID pneumonia. She was also SOB but she refused to visit the hospital. She presented to the ed and was placed on bipap with no improvement so she was intubated. She tested positive for covid, and her ct scan showed mild right pleural effusion. Admitted to MICU with AHRF, septic shock.  Currently patient is intubated, ventilated, on Levophed, sedated and on heparin infusion. Palliative consulted for high LACE score.      INTERVAL EVENTS:  - patient extubated to Bipap today, currently complaining of being cold and hungry  - both daughter and son are at bedside    ADVANCE DIRECTIVES:    MOLST  [ ]  Living Will  [ ]   DECISION MAKER(s): Son and dtr  [ ] Health Care Proxy(s)  [ ] Surrogate(s)  [X ] Guardian           Name(s): Phone Number(s): Renard    BASELINE (I)ADL(s) (prior to admission):  [place]: [ ]Total  [ X] Moderate [ ]Dependent  Palliative Performance Status Version 2:         %   30 http://npcrc.org/files/news/palliative_performance_scale_[email or web address].pdf    Allergies    No Known Allergies    Intolerances    MEDICATIONS  (STANDING):  chlorhexidine 2% Cloths 1 Application(s) Topical daily  dexAMETHasone  Injectable 6 milliGRAM(s) IV Push daily  heparin  Infusion. 2400 Unit(s)/Hr (24 mL/Hr) IV Continuous <Continuous>  insulin lispro (ADMELOG) corrective regimen sliding scale   SubCutaneous every 6 hours  losartan 50 milliGRAM(s) Oral daily  meropenem  IVPB 1000 milliGRAM(s) IV Intermittent every 12 hours  pantoprazole   Suspension 40 milliGRAM(s) Oral daily  polyethylene glycol 3350 17 Gram(s) Oral daily  senna 2 Tablet(s) Oral at bedtime  sodium bicarbonate  Injectable 100 milliEquivalent(s) IV Push once    MEDICATIONS  (PRN):  acetaminophen  Suppository .. 650 milliGRAM(s) Rectal every 4 hours PRN Temp greater or equal to 38.5C (101.3F)  heparin   Injectable 9000 Unit(s) IV Push every 6 hours PRN For aPTT less than 40  heparin   Injectable 4000 Unit(s) IV Push every 6 hours PRN For aPTT between 40 - 57    PRESENT SYMPTOMS:   Pain: [ ]yes [X ]no  QOL impact -   Location -                    Aggravating factors -  Quality -  Radiation -  Timing-  Severity (0-10 scale):  Minimal acceptable level (0-10 scale):     CPOT:  1  https://www.[email or web address]/getattachment/[email or web address]/Critical-Care-Pain-Observation-Tool-(CPOT)    Dyspnea:                           [ ]Mild [ ]Moderate [ ]Severe - denies  Anxiety:                             [ ]Mild [ ]Moderate [ ]Severe- denies   Fatigue:                             [ ]Mild [ ]Moderate [ ]Severe - denies  Nausea:                             [ ]Mild [ ]Moderate [ ]Severe - denies  Loss of appetite:              [ ]Mild [ ]Moderate [ ]Severe  Constipation:                    [ ]Mild [ ]Moderate [ ]Severe    Other Symptoms:  + hungry  + cold  [ X]All other review of systems negative     Palliative Performance Status Version 2:       20  %    [email or web address]/files/[email or web address]_performance_scale_[email or web address].pdf    PHYSICAL EXAM:  Vital Signs Last 24 Hrs  T(C): 36.9 (07 Feb 2023 08:00), Max: 37.1 (06 Feb 2023 20:00)  T(F): 98.4 (07 Feb 2023 08:00), Max: 98.8 (06 Feb 2023 20:00)  HR: 74 (07 Feb 2023 12:00) (44 - 98)  BP: 182/68 (07 Feb 2023 11:30) (108/46 - 182/68)  BP(mean): 108 (07 Feb 2023 11:30) (58 - 143)  RR: 33 (07 Feb 2023 12:00) (23 - 43)  SpO2: 97% (07 Feb 2023 12:00) (95% - 100%)    GENERAL:  [X ]Alert  [X ]Oriented x 2-3  [ ]Lethargic  [ ]Cachexia  [ ]Unarousable  [ X]Verbal  [ ]Non-Verbal  Behavioral:   [ ] Anxiety  [ ] Delirium [ ] Agitation [ X] Calm  HEENT:  [X]Normal   [ ]Dry mouth   [ ]ET Tube/Trach  [ ]Oral lesions  PULMONARY:   [ ]Clear [X ] No Tachypnea  [ ]Audible excessive secretions   [ ]Rhonchi        [ ]Right [ ]Left [ ]Bilateral  [ ]Crackles        [ ]Right [ ]Left [ ]Bilateral  [ ]Wheezing     [ ]Right [ ]Left [ ]Bilateral  [ ]Diminished breath sounds [ ]right [ ]left [ ]bilateral  GASTROINTESTINAL:  [X ]Soft  [ ]Distended   [ ]+BS  [ ]Non tender [ ]Tender  [ ]PEG [ ]OGT/ NGT  Last BM:   MUSCULOSKELETAL:   [ ]Normal   [ ]Weakness  [ X]Bed/Wheelchair bound [ ]Edema  NEUROLOGIC:   [X ]No focal deficits  [ ]Cognitive impairment  [ ]Dysphagia [ ]Dysarthria [ ]Paresis [ ]Other   SKIN:   [X ]Normal    [ ]Rash  [ ]Pressure ulcer(s)       Present on admission [ ]y [ ]n    CRITICAL CARE:  [ ] Shock Present  [ ]Septic [ ]Cardiogenic [ ]Neurologic [ ]Hypovolemic  [ ]  Vasopressors [ ]  Inotropes   [ ]Respiratory failure present [ ]Mechanical ventilation [ X]Non-invasive ventilatory support [ ]High flow  [ ]Acute  [ ]Chronic [ ]Hypoxic  [ ]Hypercarbic [ ]Other  [ ]Other organ failure     LABS:                        8.1    14.68 )-----------( 139      ( 07 Feb 2023 05:00 )             24.7   02-07    146  |  106  |  80<HH>  ----------------------------<  112<H>  3.7   |  30  |  1.6<H>    Ca    10.9<H>      07 Feb 2023 05:00  Mg     1.6     02-07    TPro  4.5<L>  /  Alb  2.6<L>  /  TBili  0.5  /  DBili  x   /  AST  11  /  ALT  20  /  AlkPhos  110  02-07  PT/INR - ( 06 Feb 2023 10:43 )   PT: 12.00 sec;   INR: 1.05 ratio         PTT - ( 07 Feb 2023 05:00 )  PTT:95.8 sec      RADIOLOGY & ADDITIONAL STUDIES:    < from: 12 Lead ECG (02.07.23 @ 06:41) >    Ventricular Rate 56 BPM    Atrial Rate 56 BPM    QRS Duration 113 ms    Q-T Interval 429 ms    QTC Calculation(Bazett) 414 ms    R Axis 97 degrees    T Axis 74 degrees    Diagnosis Line Atrial fibrillation with slow ventricular response with premature ventricular  or aberrantly conducted complexes  Rightward axis  Abnormal ECG    Confirmed by Stacy Blackburn MD ([id number]) on 2/7/2023 10:31:22 AM    < end of copied text >    < from: Xray Chest 1 View- PORTABLE-Routine (Xray Chest 1 View- PORTABLE-Routine in AM.) (02.07.23 @ 06:31) >    FINDINGS/  IMPRESSION:    Support devices: Endotracheal tube enteric tube and left IJ line are in   satisfactory position.    Cardiac/mediastinum/hilum: Stable.    Lung parenchyma/Pleura: Stable to increased right basilar   opacity/effusion. No pneumothorax.    Skeleton/soft tissues: Stable.    --- End of Report ---    < end of copied text >        Goals of Care Document:   - spoke with children at bedside and introduced palliative care role  - they are feeling optimistic and hopeful since the patient is extubated today. for now the plan is ongoing medical management. support rendered.

## 2023-02-07 NOTE — PROGRESS NOTE ADULT - ASSESSMENT
NOLA / last creat 0.9 on 1/3/23 per HIE / hx of solitary kidney   Likely ATN (hypotension, COVID19/ARDS) / CRS / heavy NSAID use  Hyponatremia improving with iv hydration  HAGMA d/t renal failure / r/o lactic acidosis/DKA  Acute respiratory failure / pulmonary edema / COVID19 / ARDS   Septic shock on pressors   - non oliguric   - creat trending down    - on MV  - strict i/o   - corrected calcium around 11 / PTH mediated, will need w/u / also vit D deficient   -  last ph noted, repeat  - increase meropenem to q 12   - hypertrophic right kidney on CT / 2/4 renal ultrasound reviewed, left kidney not visualized, right kidney large w/o hydronephrosis  - will follow

## 2023-02-07 NOTE — CONSULT NOTE ADULT - SUBJECTIVE AND OBJECTIVE BOX
HPI:   Patient is a 73 yo female hx of  afib, htn, solitary kidney presented from [facility] for SOB.   As per the family, the patient tested positive for covid 4 days ago and she has been feeling unwell. She was also SOB but she refused to visit the hospital.   Today she was altered and her oxygen dropped to 70s in the NH, in addition to a drop in her bp.   She presented to the ed and was placed on bipap with no improvement so she was intubated.   She tested positive for covid, and her ct scan showed mild right pleural effusion. admitted to MICU called to evaluate (01 Feb 2023 00:39)      PAST MEDICAL & SURGICAL HISTORY:  HTN (hypertension)  Diabetes mellitus  Obesity  Gastroesophageal reflux disease  Active asthma  Generalized OA  Afib  H/O hernia repair  2011 and revised in 2013  History of cholecystectomy    REVIEW OF SYSTEMS: Pt unable to offer    MEDICATIONS  (STANDING):  chlorhexidine 2% Cloths 1 Application(s) Topical daily  dexAMETHasone  Injectable 6 milliGRAM(s) IV Push daily  heparin  Infusion. 2400 Unit(s)/Hr (24 mL/Hr) IV Continuous <Continuous>  insulin lispro (ADMELOG) corrective regimen sliding scale   SubCutaneous every 6 hours  losartan 50 milliGRAM(s) Oral daily  meropenem  IVPB 1000 milliGRAM(s) IV Intermittent every 12 hours  pantoprazole   Suspension 40 milliGRAM(s) Oral daily  polyethylene glycol 3350 17 Gram(s) Oral daily  senna 2 Tablet(s) Oral at bedtime  sodium bicarbonate  Injectable 100 milliEquivalent(s) IV Push once    MEDICATIONS  (PRN):  acetaminophen  Suppository .. 650 milliGRAM(s) Rectal every 4 hours PRN Temp greater or equal to 38.5C (101.3F)  heparin   Injectable 9000 Unit(s) IV Push every 6 hours PRN For aPTT less than 40  heparin   Injectable 4000 Unit(s) IV Push every 6 hours PRN For aPTT between 40 - 57      Allergies    No Known Allergies    Intolerances    SOCIAL HISTORY: From nursing home     FAMILY HISTORY:  No pertinent family hx noted     PHYSICAL EXAM:  Vital Signs Last 24 Hrs  T(C): 36.9 (07 Feb 2023 08:00), Max: 37.1 (06 Feb 2023 20:00)  T(F): 98.4 (07 Feb 2023 08:00), Max: 98.8 (06 Feb 2023 20:00)  HR: 74 (07 Feb 2023 12:00) (44 - 98)  BP: 182/68 (07 Feb 2023 11:30) (104/54 - 182/68)  BP(mean): 108 (07 Feb 2023 11:30) (58 - 143)  RR: 33 (07 Feb 2023 12:00) (23 - 43)  SpO2: 97% (07 Feb 2023 12:00) (95% - 100%)    Parameters below as of 07 Feb 2023 10:00  Patient On (Oxygen Delivery Method): ventilator    O2 Concentration (%): 40  PHYSICAL EXAM:  GENERAL: intubated, seated  HEAD:  Atraumatic, Normocephalic  EYES: EOMI, PERRLA, conjunctiva and sclera clear  NECK: Supple, No JVD, Normal thyroid, no enlarged nodes  NERVOUS SYSTEM:  Alert & Awake. Follows commands  CHEST/LUNG: B/L good air entry; No rales, rhonchi, or wheezing  HEART: S1S2 normal, no S3, irregularly irregular rhythm; Bradycardia; No murmurs  ABDOMEN: Obese, Soft, Nontender, Nondistended; Bowel sounds present  EXTREMITIES:  2+ Peripheral Pulses, No clubbing, cyanosis, or edema  LYMPH: No lymphadenopathy noted  SKIN:  pressure Injury         LABS/ CULTURES/ RADIOLOGY:                        8.1    14.68 )-----------( 139      ( 07 Feb 2023 05:00 )             24.7       146  |  106  |  80  ----------------------------<  112      [02-07-23 @ 05:00]  3.7   |  30  |  1.6        Ca     10.9     [02-07-23 @ 05:00]      Mg     1.6     [02-07-23 @ 05:00]    TPro  4.5  /  Alb  2.6  /  TBili  0.5  /  DBili  x   /  AST  11  /  ALT  20  /  AlkPhos  110  [02-07-23 @ 05:00]    PT/INR: PT 12.00, INR 1.05       [02-06-23 @ 10:43]  PTT: 95.8       [02-07-23 @ 05:00]          [02-06-23 @ 10:43]        Culture - Blood (collected 01-31-23 @ 19:03)  Source: .Blood Blood-Peripheral  Final Report (02-06-23 @ 01:00):    No Growth Final    Culture - Blood (collected 01-31-23 @ 19:03)  Source: .Blood Blood-Peripheral  Gram Stain (02-02-23 @ 08:28):    Growth in aerobic bottle: Gram Positive Cocci in Clusters  Final Report (02-02-23 @ 21:27):    Growth in aerobic bottle: Staphylococcus epidermidis    Coag Negative Staphylococcus    Single set isolate, possible contaminant. Contact    Microbiology if susceptibility testing clinically    indicated.    ***Blood Panel PCR results on this specimen are available    approximately 3 hours after the Gram stain result.***    Gram stain, PCR, and/or culture results may not always    correspond due to difference in methodologies.    ************************************************************    This PCR assay was performed by multiplex PCR. This    Assay tests for 66 bacterial and resistance gene targets.    Please refer to the [facility] test directory    at [email or web address] for details.  Organism: Blood Culture PCR (02-02-23 @ 21:27)  Organism: Blood Culture PCR (02-02-23 @ 21:27)      -  Staphylococcus epidermidis, Methicillin resistant: Detec      Method Type: PCR

## 2023-02-07 NOTE — PROGRESS NOTE ADULT - ASSESSMENT
IMPRESSION:    Acute hypoxemic resp failure still intubated on 40%/ 10  ARDS improving  Sepsis POA  Septic shock improving  ESBL UTI  G+C bacteremia/ COAG - STAP  COVID +  NOLA  likely ATN vs prerenal imroving  Possible CAP  HO congentital unilateral kidney  HO A FIb not  on AC?      PLAN:    CNS: Sedate with fentanyl + (awake follows commands)    HEENT: Oral care, ETT    PULMONARY:  HOB @ 45 degrees.    Vent changes as follows: ARDSNET  monitor P/P/DP, repeat CXR daily, dec PEEP  Dexamethasone per COVID protocol  SBT    CARDIOVASCULAR: Fup echo , lasix 40 IV x1    GI: GI prophylaxis. feeding     RENAL:  Follow up lytes.  Correct as needed. renal US. renal fup.  Maintain roughly net even.     INFECTIOUS DISEASE: Follow up cultures. Meropenem for ESBL Proteus, ID eval.  Dexamethasone for COVID    HEMATOLOGICAL: patient has a fib not on ac, heparin gtt    ENDOCRINE:  Follow up FS.  Insulin protocol if needed.    MUSCULOSKELETAL: bedrest    MICU.  L IJ/ ya 2/2

## 2023-02-07 NOTE — PROGRESS NOTE ADULT - PROBLEM SELECTOR PLAN 1
2/2 COVID  intubated since 1/31 --> extubated to Bipap today   ID following for recommendations  c/w meropenem, high risk, monitor counts 2/2 COVID  intubated since 1/31 --> extubated to Bipap today   ID following for recommendations  c/w meropenem (D#5), high risk, monitor counts

## 2023-02-08 LAB
ALBUMIN SERPL ELPH-MCNC: 2.7 G/DL — LOW (ref 3.5–5.2)
ALP SERPL-CCNC: 111 U/L — SIGNIFICANT CHANGE UP (ref 30–115)
ALT FLD-CCNC: 34 U/L — SIGNIFICANT CHANGE UP (ref 0–41)
ANION GAP SERPL CALC-SCNC: 9 MMOL/L — SIGNIFICANT CHANGE UP (ref 7–14)
APTT BLD: 190.5 SEC — CRITICAL HIGH (ref 27–39.2)
AST SERPL-CCNC: 22 U/L — SIGNIFICANT CHANGE UP (ref 0–41)
BASOPHILS # BLD AUTO: 0.03 K/UL — SIGNIFICANT CHANGE UP (ref 0–0.2)
BASOPHILS NFR BLD AUTO: 0.2 % — SIGNIFICANT CHANGE UP (ref 0–1)
BILIRUB SERPL-MCNC: 0.7 MG/DL — SIGNIFICANT CHANGE UP (ref 0.2–1.2)
BUN SERPL-MCNC: 84 MG/DL — CRITICAL HIGH (ref 10–20)
CALCIUM SERPL-MCNC: 10.5 MG/DL — HIGH (ref 8.4–10.4)
CHLORIDE SERPL-SCNC: 106 MMOL/L — SIGNIFICANT CHANGE UP (ref 98–110)
CO2 SERPL-SCNC: 36 MMOL/L — HIGH (ref 17–32)
CREAT SERPL-MCNC: 1.5 MG/DL — SIGNIFICANT CHANGE UP (ref 0.7–1.5)
EGFR: 37 ML/MIN/1.73M2 — LOW
EOSINOPHIL # BLD AUTO: 0.02 K/UL — SIGNIFICANT CHANGE UP (ref 0–0.7)
EOSINOPHIL NFR BLD AUTO: 0.1 % — SIGNIFICANT CHANGE UP (ref 0–8)
GLUCOSE BLDC GLUCOMTR-MCNC: 123 MG/DL — HIGH (ref 70–99)
GLUCOSE BLDC GLUCOMTR-MCNC: 125 MG/DL — HIGH (ref 70–99)
GLUCOSE BLDC GLUCOMTR-MCNC: 133 MG/DL — HIGH (ref 70–99)
GLUCOSE BLDC GLUCOMTR-MCNC: 142 MG/DL — HIGH (ref 70–99)
GLUCOSE SERPL-MCNC: 128 MG/DL — HIGH (ref 70–99)
HCT VFR BLD CALC: 25.5 % — LOW (ref 37–47)
HCT VFR BLD CALC: 26.9 % — LOW (ref 37–47)
HGB BLD-MCNC: 8.2 G/DL — LOW (ref 12–16)
HGB BLD-MCNC: 8.3 G/DL — LOW (ref 12–16)
IMM GRANULOCYTES NFR BLD AUTO: 3.8 % — HIGH (ref 0.1–0.3)
LYMPHOCYTES # BLD AUTO: 1.37 K/UL — SIGNIFICANT CHANGE UP (ref 1.2–3.4)
LYMPHOCYTES # BLD AUTO: 8.5 % — LOW (ref 20.5–51.1)
MAGNESIUM SERPL-MCNC: 2 MG/DL — SIGNIFICANT CHANGE UP (ref 1.8–2.4)
MCHC RBC-ENTMCNC: 28.1 PG — SIGNIFICANT CHANGE UP (ref 27–31)
MCHC RBC-ENTMCNC: 28.7 PG — SIGNIFICANT CHANGE UP (ref 27–31)
MCHC RBC-ENTMCNC: 30.9 G/DL — LOW (ref 32–37)
MCHC RBC-ENTMCNC: 32.2 G/DL — SIGNIFICANT CHANGE UP (ref 32–37)
MCV RBC AUTO: 89.2 FL — SIGNIFICANT CHANGE UP (ref 81–99)
MCV RBC AUTO: 91.2 FL — SIGNIFICANT CHANGE UP (ref 81–99)
MONOCYTES # BLD AUTO: 1.73 K/UL — HIGH (ref 0.1–0.6)
MONOCYTES NFR BLD AUTO: 10.8 % — HIGH (ref 1.7–9.3)
NEUTROPHILS # BLD AUTO: 12.28 K/UL — HIGH (ref 1.4–6.5)
NEUTROPHILS NFR BLD AUTO: 76.6 % — HIGH (ref 42.2–75.2)
NRBC # BLD: 0 /100 WBCS — SIGNIFICANT CHANGE UP (ref 0–0)
NRBC # BLD: 0 /100 WBCS — SIGNIFICANT CHANGE UP (ref 0–0)
PLATELET # BLD AUTO: 180 K/UL — SIGNIFICANT CHANGE UP (ref 130–400)
PLATELET # BLD AUTO: 187 K/UL — SIGNIFICANT CHANGE UP (ref 130–400)
POTASSIUM SERPL-MCNC: 3 MMOL/L — LOW (ref 3.5–5)
POTASSIUM SERPL-SCNC: 3 MMOL/L — LOW (ref 3.5–5)
PROT SERPL-MCNC: 4.9 G/DL — LOW (ref 6–8)
RBC # BLD: 2.86 M/UL — LOW (ref 4.2–5.4)
RBC # BLD: 2.95 M/UL — LOW (ref 4.2–5.4)
RBC # FLD: 13.7 % — SIGNIFICANT CHANGE UP (ref 11.5–14.5)
RBC # FLD: 14.1 % — SIGNIFICANT CHANGE UP (ref 11.5–14.5)
SODIUM SERPL-SCNC: 151 MMOL/L — HIGH (ref 135–146)
WBC # BLD: 15.66 K/UL — HIGH (ref 4.8–10.8)
WBC # BLD: 16.04 K/UL — HIGH (ref 4.8–10.8)
WBC # FLD AUTO: 15.66 K/UL — HIGH (ref 4.8–10.8)
WBC # FLD AUTO: 16.04 K/UL — HIGH (ref 4.8–10.8)

## 2023-02-08 PROCEDURE — 71045 X-RAY EXAM CHEST 1 VIEW: CPT | Mod: 26

## 2023-02-08 PROCEDURE — 71045 X-RAY EXAM CHEST 1 VIEW: CPT | Mod: 26,77

## 2023-02-08 PROCEDURE — 99233 SBSQ HOSP IP/OBS HIGH 50: CPT

## 2023-02-08 RX ORDER — HEPARIN SODIUM 5000 [USP'U]/ML
9000 INJECTION INTRAVENOUS; SUBCUTANEOUS ONCE
Refills: 0 | Status: COMPLETED | OUTPATIENT
Start: 2023-02-08 | End: 2023-02-08

## 2023-02-08 RX ORDER — HEPARIN SODIUM 5000 [USP'U]/ML
9000 INJECTION INTRAVENOUS; SUBCUTANEOUS EVERY 6 HOURS
Refills: 0 | Status: DISCONTINUED | OUTPATIENT
Start: 2023-02-08 | End: 2023-02-08

## 2023-02-08 RX ORDER — MEROPENEM 1 G/30ML
1000 INJECTION INTRAVENOUS EVERY 8 HOURS
Refills: 0 | Status: DISCONTINUED | OUTPATIENT
Start: 2023-02-08 | End: 2023-02-13

## 2023-02-08 RX ORDER — SODIUM CHLORIDE 9 MG/ML
1000 INJECTION, SOLUTION INTRAVENOUS
Refills: 0 | Status: DISCONTINUED | OUTPATIENT
Start: 2023-02-08 | End: 2023-02-09

## 2023-02-08 RX ORDER — POTASSIUM CHLORIDE 20 MEQ
20 PACKET (EA) ORAL
Refills: 0 | Status: COMPLETED | OUTPATIENT
Start: 2023-02-08 | End: 2023-02-08

## 2023-02-08 RX ORDER — MAGNESIUM OXIDE 400 MG ORAL TABLET 241.3 MG
400 TABLET ORAL DAILY
Refills: 0 | Status: DISCONTINUED | OUTPATIENT
Start: 2023-02-09 | End: 2023-02-16

## 2023-02-08 RX ORDER — LOSARTAN POTASSIUM 100 MG/1
50 TABLET, FILM COATED ORAL DAILY
Refills: 0 | Status: DISCONTINUED | OUTPATIENT
Start: 2023-02-08 | End: 2023-02-08

## 2023-02-08 RX ORDER — PANTOPRAZOLE SODIUM 20 MG/1
40 TABLET, DELAYED RELEASE ORAL ONCE
Refills: 0 | Status: COMPLETED | OUTPATIENT
Start: 2023-02-08 | End: 2023-02-08

## 2023-02-08 RX ORDER — NIFEDIPINE 30 MG
90 TABLET, EXTENDED RELEASE 24 HR ORAL DAILY
Refills: 0 | Status: DISCONTINUED | OUTPATIENT
Start: 2023-02-08 | End: 2023-02-08

## 2023-02-08 RX ORDER — MEROPENEM 1 G/30ML
INJECTION INTRAVENOUS
Refills: 0 | Status: DISCONTINUED | OUTPATIENT
Start: 2023-02-08 | End: 2023-02-13

## 2023-02-08 RX ORDER — PANTOPRAZOLE SODIUM 20 MG/1
40 TABLET, DELAYED RELEASE ORAL DAILY
Refills: 0 | Status: DISCONTINUED | OUTPATIENT
Start: 2023-02-09 | End: 2023-02-09

## 2023-02-08 RX ORDER — MEROPENEM 1 G/30ML
1000 INJECTION INTRAVENOUS ONCE
Refills: 0 | Status: COMPLETED | OUTPATIENT
Start: 2023-02-08 | End: 2023-02-08

## 2023-02-08 RX ORDER — HEPARIN SODIUM 5000 [USP'U]/ML
4000 INJECTION INTRAVENOUS; SUBCUTANEOUS EVERY 6 HOURS
Refills: 0 | Status: DISCONTINUED | OUTPATIENT
Start: 2023-02-08 | End: 2023-02-08

## 2023-02-08 RX ORDER — HEPARIN SODIUM 5000 [USP'U]/ML
1500 INJECTION INTRAVENOUS; SUBCUTANEOUS
Qty: 25000 | Refills: 0 | Status: DISCONTINUED | OUTPATIENT
Start: 2023-02-08 | End: 2023-02-08

## 2023-02-08 RX ORDER — LOSARTAN POTASSIUM 100 MG/1
50 TABLET, FILM COATED ORAL DAILY
Refills: 0 | Status: DISCONTINUED | OUTPATIENT
Start: 2023-02-09 | End: 2023-02-09

## 2023-02-08 RX ORDER — ENOXAPARIN SODIUM 100 MG/ML
110 INJECTION SUBCUTANEOUS EVERY 12 HOURS
Refills: 0 | Status: DISCONTINUED | OUTPATIENT
Start: 2023-02-08 | End: 2023-02-09

## 2023-02-08 RX ADMIN — PANTOPRAZOLE SODIUM 40 MILLIGRAM(S): 20 TABLET, DELAYED RELEASE ORAL at 15:28

## 2023-02-08 RX ADMIN — Medication 6 MILLIGRAM(S): at 05:08

## 2023-02-08 RX ADMIN — LOSARTAN POTASSIUM 50 MILLIGRAM(S): 100 TABLET, FILM COATED ORAL at 05:39

## 2023-02-08 RX ADMIN — HEPARIN SODIUM 1500 UNIT(S)/HR: 5000 INJECTION INTRAVENOUS; SUBCUTANEOUS at 07:27

## 2023-02-08 RX ADMIN — MEROPENEM 100 MILLIGRAM(S): 1 INJECTION INTRAVENOUS at 05:09

## 2023-02-08 RX ADMIN — HEPARIN SODIUM 1800 UNIT(S)/HR: 5000 INJECTION INTRAVENOUS; SUBCUTANEOUS at 03:15

## 2023-02-08 RX ADMIN — Medication 50 MILLIEQUIVALENT(S): at 09:36

## 2023-02-08 RX ADMIN — MEROPENEM 100 MILLIGRAM(S): 1 INJECTION INTRAVENOUS at 22:56

## 2023-02-08 RX ADMIN — ENOXAPARIN SODIUM 110 MILLIGRAM(S): 100 INJECTION SUBCUTANEOUS at 13:06

## 2023-02-08 RX ADMIN — CHLORHEXIDINE GLUCONATE 1 APPLICATION(S): 213 SOLUTION TOPICAL at 15:30

## 2023-02-08 RX ADMIN — SODIUM CHLORIDE 100 MILLILITER(S): 9 INJECTION, SOLUTION INTRAVENOUS at 13:07

## 2023-02-08 RX ADMIN — MEROPENEM 100 MILLIGRAM(S): 1 INJECTION INTRAVENOUS at 16:37

## 2023-02-08 RX ADMIN — Medication 50 MILLIEQUIVALENT(S): at 11:01

## 2023-02-08 NOTE — PROGRESS NOTE ADULT - SUBJECTIVE AND OBJECTIVE BOX
Patient is a 72y old  Female who presents with a chief complaint of respiratory distress (08 Feb 2023 10:05)      INTERVAL HPI/OVERNIGHT EVENTS:   Extubated yesterday 2/7. No overnight events   Afebrile, hemodynamically stable     ICU Vital Signs Last 24 Hrs  T(C): 37.3 (08 Feb 2023 08:00), Max: 37.8 (07 Feb 2023 20:00)  T(F): 99.2 (08 Feb 2023 08:00), Max: 100 (07 Feb 2023 20:00)  HR: 67 (08 Feb 2023 12:00) (56 - 94)  BP: 156/67 (08 Feb 2023 12:00) (132/60 - 189/82)  BP(mean): 97 (08 Feb 2023 12:00) (87 - 121)  ABP: --  ABP(mean): --  RR: 37 (08 Feb 2023 12:00) (25 - 60)  SpO2: 98% (08 Feb 2023 12:00) (69% - 100%)    O2 Parameters below as of 08 Feb 2023 08:00  Patient On (Oxygen Delivery Method): nasal cannula  O2 Flow (L/min): 4        I&O's Summary    07 Feb 2023 07:01  -  08 Feb 2023 07:00  --------------------------------------------------------  IN: 1238.9 mL / OUT: 5690 mL / NET: -4451.1 mL    08 Feb 2023 07:01  -  08 Feb 2023 12:52  --------------------------------------------------------  IN: 145 mL / OUT: 400 mL / NET: -255 mL          LABS:                        8.2    16.04 )-----------( 180      ( 08 Feb 2023 05:00 )             25.5     02-08    151<H>  |  106  |  84<HH>  ----------------------------<  128<H>  3.0<L>   |  36<H>  |  1.5    Ca    10.5<H>      08 Feb 2023 05:00  Mg     2.0     02-08    TPro  4.9<L>  /  Alb  2.7<L>  /  TBili  0.7  /  DBili  x   /  AST  22  /  ALT  34  /  AlkPhos  111  02-08    PTT - ( 08 Feb 2023 05:00 )  PTT:190.5 sec    CAPILLARY BLOOD GLUCOSE      POCT Blood Glucose.: 123 mg/dL (08 Feb 2023 05:14)  POCT Blood Glucose.: 121 mg/dL (07 Feb 2023 23:11)  POCT Blood Glucose.: 136 mg/dL (07 Feb 2023 17:57)    ABG - ( 07 Feb 2023 13:27 )  pH, Arterial: 7.55  pH, Blood: x     /  pCO2: 41    /  pO2: 89    / HCO3: 36    / Base Excess: 12.3  /  SaO2: 96.6                RADIOLOGY & ADDITIONAL TESTS:    Consultant(s) Notes Reviewed:  [x ] YES  [ ] NO    MEDICATIONS  (STANDING):  chlorhexidine 2% Cloths 1 Application(s) Topical daily  dexAMETHasone  Injectable 6 milliGRAM(s) IV Push daily  enoxaparin Injectable 110 milliGRAM(s) SubCutaneous every 12 hours  insulin lispro (ADMELOG) corrective regimen sliding scale   SubCutaneous every 6 hours  losartan 50 milliGRAM(s) Oral daily  meropenem  IVPB 1000 milliGRAM(s) IV Intermittent every 12 hours  pantoprazole   Suspension 40 milliGRAM(s) Oral daily  polyethylene glycol 3350 17 Gram(s) Oral daily  senna 2 Tablet(s) Oral at bedtime  sodium bicarbonate  Injectable 100 milliEquivalent(s) IV Push once  sodium chloride 0.45%. 1000 milliLiter(s) (100 mL/Hr) IV Continuous <Continuous>    MEDICATIONS  (PRN):  acetaminophen  Suppository .. 650 milliGRAM(s) Rectal every 4 hours PRN Temp greater or equal to 38.5C (101.3F)      PHYSICAL EXAM:  GENERAL: resting comfortably  HEAD:  Atraumatic, Normocephalic  EYES: EOMI, PERRLA, conjunctiva and sclera clear  NECK: Supple, No JVD, Normal thyroid, no enlarged nodes  NERVOUS SYSTEM:  Alert & Awake. Follows commands  CHEST/LUNG: B/L good air entry; No rales, rhonchi, or wheezing  HEART: S1S2 normal, no S3, Regular rate and rhythm; No murmurs  ABDOMEN: Soft, Nontender, Nondistended; Bowel sounds present  EXTREMITIES:  2+ Peripheral Pulses, No clubbing, cyanosis, or edema  LYMPH: No lymphadenopathy noted  SKIN: No rashes or lesions    Care Discussed with Consultants/Other Providers [ x] YES  [ ] NO

## 2023-02-08 NOTE — PROGRESS NOTE ADULT - SUBJECTIVE AND OBJECTIVE BOX
Over Night Events: events noted, sp extubation, on BIPAP, low grade fever, palliative noted    PHYSICAL EXAM    ICU Vital Signs Last 24 Hrs  T(C): 37.3 (08 Feb 2023 04:00), Max: 37.8 (07 Feb 2023 20:00)  T(F): 99.1 (08 Feb 2023 04:00), Max: 100 (07 Feb 2023 20:00)  HR: 65 (08 Feb 2023 06:30) (46 - 98)  BP: 150/69 (08 Feb 2023 06:30) (132/60 - 202/80)  BP(mean): 99 (08 Feb 2023 06:30) (82 - 143)  RR: 39 (08 Feb 2023 06:30) (25 - 60)  SpO2: 97% (08 Feb 2023 06:30) (69% - 100%)    O2 Parameters below as of 08 Feb 2023 05:00  Patient On (Oxygen Delivery Method): BiPAP/CPAP    O2 Concentration (%): 40        General: ill looking  Lung: dec bs both bases  Cardiovascular: EARLENE 2.6  Abdomen: Soft, Positive BS  Extremities: No clubbing   Neurological: Non focal       02-06-23 @ 07:01  -  02-07-23 @ 07:00  --------------------------------------------------------  IN:    Dexmedetomidine: 107.3 mL    FentaNYL: 522.5 mL    Heparin Infusion: 432 mL    IV PiggyBack: 50 mL    Norepinephrine: 8.2 mL    Peptamen A.F.: 1440 mL  Total IN: 2560 mL    OUT:    Indwelling Catheter - Urethral (mL): 2890 mL    Norepinephrine: 0 mL    Rectal Tube (mL): 1300 mL  Total OUT: 4190 mL    Total NET: -1630 mL      02-07-23 @ 07:01  -  02-08-23 @ 06:48  --------------------------------------------------------  IN:    Dexmedetomidine: 8.4 mL    Enteral Tube Flush: 30 mL    FentaNYL: 16.5 mL    Heparin Infusion: 414 mL    IV PiggyBack: 300 mL    Oral Fluid: 410 mL    Peptamen A.F.: 60 mL  Total IN: 1238.9 mL    OUT:    Indwelling Catheter - Urethral (mL): 4840 mL    Rectal Tube (mL): 600 mL  Total OUT: 5440 mL    Total NET: -4201.1 mL          LABS:                          8.2    16.04 )-----------( 180      ( 08 Feb 2023 05:00 )             25.5                                               02-07    146  |  106  |  80<HH>  ----------------------------<  112<H>  3.7   |  30  |  1.6<H>    Ca    10.9<H>      07 Feb 2023 05:00  Mg     1.6     02-07    TPro  4.5<L>  /  Alb  2.6<L>  /  TBili  0.5  /  DBili  x   /  AST  11  /  ALT  20  /  AlkPhos  110  02-07      PT/INR - ( 06 Feb 2023 10:43 )   PT: 12.00 sec;   INR: 1.05 ratio         PTT - ( 08 Feb 2023 05:00 )  PTT:190.5 sec                                                                                     LIVER FUNCTIONS - ( 07 Feb 2023 05:00 )  Alb: 2.6 g/dL / Pro: 4.5 g/dL / ALK PHOS: 110 U/L / ALT: 20 U/L / AST: 11 U/L / GGT: x                                                                                               Mode: CPAP with PS  FiO2: 40  PEEP: 8  PS: 10                                      ABG - ( 07 Feb 2023 13:27 )  pH, Arterial: 7.55  pH, Blood: x     /  pCO2: 41    /  pO2: 89    / HCO3: 36    / Base Excess: 12.3  /  SaO2: 96.6                MEDICATIONS  (STANDING):  chlorhexidine 2% Cloths 1 Application(s) Topical daily  dexAMETHasone  Injectable 6 milliGRAM(s) IV Push daily  heparin   Injectable 9000 Unit(s) IV Push once  heparin  Infusion. 1500 Unit(s)/Hr (15 mL/Hr) IV Continuous <Continuous>  insulin lispro (ADMELOG) corrective regimen sliding scale   SubCutaneous every 6 hours  losartan 50 milliGRAM(s) Oral daily  meropenem  IVPB 1000 milliGRAM(s) IV Intermittent every 12 hours  pantoprazole   Suspension 40 milliGRAM(s) Oral daily  polyethylene glycol 3350 17 Gram(s) Oral daily  senna 2 Tablet(s) Oral at bedtime  sodium bicarbonate  Injectable 100 milliEquivalent(s) IV Push once    MEDICATIONS  (PRN):  acetaminophen  Suppository .. 650 milliGRAM(s) Rectal every 4 hours PRN Temp greater or equal to 38.5C (101.3F)  heparin   Injectable 9000 Unit(s) IV Push every 6 hours PRN For aPTT less than 40  heparin   Injectable 4000 Unit(s) IV Push every 6 hours PRN For aPTT between 40 - 57      CXR reviewed   Over Night Events: events noted, sp extubation, on BIPAP, low grade fever, palliative noted, on 4 l nc, on IV HEP    PHYSICAL EXAM    ICU Vital Signs Last 24 Hrs  T(C): 37.3 (08 Feb 2023 04:00), Max: 37.8 (07 Feb 2023 20:00)  T(F): 99.1 (08 Feb 2023 04:00), Max: 100 (07 Feb 2023 20:00)  HR: 65 (08 Feb 2023 06:30) (46 - 98)  BP: 150/69 (08 Feb 2023 06:30) (132/60 - 202/80)  BP(mean): 99 (08 Feb 2023 06:30) (82 - 143)  RR: 39 (08 Feb 2023 06:30) (25 - 60)  SpO2: 97% (08 Feb 2023 06:30) (69% - 100%)    O2 Parameters below as of 08 Feb 2023 05:00  Patient On (Oxygen Delivery Method): BiPAP/CPAP    O2 Concentration (%): 40        General: ill looking  Lung: dec bs both bases  Cardiovascular: EARLENE 2.6  Abdomen: Soft, Positive BS  Extremities: No clubbing   Neurological: Non focal       02-06-23 @ 07:01  -  02-07-23 @ 07:00  --------------------------------------------------------  IN:    Dexmedetomidine: 107.3 mL    FentaNYL: 522.5 mL    Heparin Infusion: 432 mL    IV PiggyBack: 50 mL    Norepinephrine: 8.2 mL    Peptamen A.F.: 1440 mL  Total IN: 2560 mL    OUT:    Indwelling Catheter - Urethral (mL): 2890 mL    Norepinephrine: 0 mL    Rectal Tube (mL): 1300 mL  Total OUT: 4190 mL    Total NET: -1630 mL      02-07-23 @ 07:01  -  02-08-23 @ 06:48  --------------------------------------------------------  IN:    Dexmedetomidine: 8.4 mL    Enteral Tube Flush: 30 mL    FentaNYL: 16.5 mL    Heparin Infusion: 414 mL    IV PiggyBack: 300 mL    Oral Fluid: 410 mL    Peptamen A.F.: 60 mL  Total IN: 1238.9 mL    OUT:    Indwelling Catheter - Urethral (mL): 4840 mL    Rectal Tube (mL): 600 mL  Total OUT: 5440 mL    Total NET: -4201.1 mL          LABS:                          8.2    16.04 )-----------( 180      ( 08 Feb 2023 05:00 )             25.5                                               02-07    146  |  106  |  80<HH>  ----------------------------<  112<H>  3.7   |  30  |  1.6<H>    Ca    10.9<H>      07 Feb 2023 05:00  Mg     1.6     02-07    TPro  4.5<L>  /  Alb  2.6<L>  /  TBili  0.5  /  DBili  x   /  AST  11  /  ALT  20  /  AlkPhos  110  02-07      PT/INR - ( 06 Feb 2023 10:43 )   PT: 12.00 sec;   INR: 1.05 ratio         PTT - ( 08 Feb 2023 05:00 )  PTT:190.5 sec                                                                                     LIVER FUNCTIONS - ( 07 Feb 2023 05:00 )  Alb: 2.6 g/dL / Pro: 4.5 g/dL / ALK PHOS: 110 U/L / ALT: 20 U/L / AST: 11 U/L / GGT: x                                                                                               Mode: CPAP with PS  FiO2: 40  PEEP: 8  PS: 10                                      ABG - ( 07 Feb 2023 13:27 )  pH, Arterial: 7.55  pH, Blood: x     /  pCO2: 41    /  pO2: 89    / HCO3: 36    / Base Excess: 12.3  /  SaO2: 96.6                MEDICATIONS  (STANDING):  chlorhexidine 2% Cloths 1 Application(s) Topical daily  dexAMETHasone  Injectable 6 milliGRAM(s) IV Push daily  heparin   Injectable 9000 Unit(s) IV Push once  heparin  Infusion. 1500 Unit(s)/Hr (15 mL/Hr) IV Continuous <Continuous>  insulin lispro (ADMELOG) corrective regimen sliding scale   SubCutaneous every 6 hours  losartan 50 milliGRAM(s) Oral daily  meropenem  IVPB 1000 milliGRAM(s) IV Intermittent every 12 hours  pantoprazole   Suspension 40 milliGRAM(s) Oral daily  polyethylene glycol 3350 17 Gram(s) Oral daily  senna 2 Tablet(s) Oral at bedtime  sodium bicarbonate  Injectable 100 milliEquivalent(s) IV Push once    MEDICATIONS  (PRN):  acetaminophen  Suppository .. 650 milliGRAM(s) Rectal every 4 hours PRN Temp greater or equal to 38.5C (101.3F)  heparin   Injectable 9000 Unit(s) IV Push every 6 hours PRN For aPTT less than 40  heparin   Injectable 4000 Unit(s) IV Push every 6 hours PRN For aPTT between 40 - 57      CXR reviewed

## 2023-02-08 NOTE — CHART NOTE - NSCHARTNOTEFT_GEN_A_CORE
Palliative following for GOC.   Patient is now extubated and on NC, pass S & S.   Spoke with family yesterday who wants to continue medical management for now.   Will sign off as patient is improving and goals are clear.   Please reconsult PRN [phone]

## 2023-02-08 NOTE — PROGRESS NOTE ADULT - SUBJECTIVE AND OBJECTIVE BOX
Nephrology progress note    THIS IS AN INCOMPLETE NOTE . FULL NOTE TO FOLLOW SHORTLY    Patient is seen and examined, events over the last 24 h noted .    Allergies:  No Known Allergies    Hospital Medications:   MEDICATIONS  (STANDING):  chlorhexidine 2% Cloths 1 Application(s) Topical daily  dexAMETHasone  Injectable 6 milliGRAM(s) IV Push daily  heparin  Infusion. 1500 Unit(s)/Hr (15 mL/Hr) IV Continuous <Continuous>  insulin lispro (ADMELOG) corrective regimen sliding scale   SubCutaneous every 6 hours  losartan 50 milliGRAM(s) Oral daily  meropenem  IVPB 1000 milliGRAM(s) IV Intermittent every 12 hours  pantoprazole   Suspension 40 milliGRAM(s) Oral daily  polyethylene glycol 3350 17 Gram(s) Oral daily  potassium chloride  20 mEq/100 mL IVPB 20 milliEquivalent(s) IV Intermittent every 2 hours  senna 2 Tablet(s) Oral at bedtime  sodium bicarbonate  Injectable 100 milliEquivalent(s) IV Push once        VITALS:  T(F): 99.1 (02-08-23 @ 04:00), Max: 100 (02-07-23 @ 20:00)  HR: 63 (02-08-23 @ 07:00)  BP: 161/68 (02-08-23 @ 07:00)  RR: 43 (02-08-23 @ 07:00)  SpO2: 96% (02-08-23 @ 07:00)  Wt(kg): --    02-06 @ 07:01  -  02-07 @ 07:00  --------------------------------------------------------  IN: 2560 mL / OUT: 4190 mL / NET: -1630 mL    02-07 @ 07:01  -  02-08 @ 07:00  --------------------------------------------------------  IN: 1238.9 mL / OUT: 5690 mL / NET: -4451.1 mL          PHYSICAL EXAM:  Constitutional: NAD  HEENT: anicteric sclera, oropharynx clear, MMM  Neck: No JVD  Respiratory: CTAB, no wheezes, rales or rhonchi  Cardiovascular: S1, S2, RRR  Gastrointestinal: BS+, soft, NT/ND  Extremities: No cyanosis or clubbing. No peripheral edema  :  No ya.   Skin: No rashes    LABS:  02-08    151<H>  |  106  |  84<HH>  ----------------------------<  128<H>  3.0<L>   |  36<H>  |  1.5    Ca    10.5<H>      08 Feb 2023 05:00  Mg     2.0     02-08    TPro  4.9<L>  /  Alb  2.7<L>  /  TBili  0.7  /  DBili      /  AST  22  /  ALT  34  /  AlkPhos  111  02-08                          8.2    16.04 )-----------( 180      ( 08 Feb 2023 05:00 )             25.5       Urine Studies:        Ferritin 271      [02-06-23 @ 10:43]  PTH -- (Ca --)      [02-04-23 @ 20:22]   470  Vitamin D (25OH) <6      [02-04-23 @ 20:22]  HbA1c 5.6      [12-20-19 @ 07:09]  TSH 0.15      [02-06-23 @ 10:43]    HCV 0.08, Nonreact      [09-27-19 @ 07:37]        RADIOLOGY & ADDITIONAL STUDIES:   Nephrology progress note  Patient is seen and examined, events over the last 24 h noted .  lying in bed  extubated     Allergies:  No Known Allergies    Hospital Medications:   MEDICATIONS  (STANDING):  dexAMETHasone  Injectable 6 milliGRAM(s) IV Push daily  heparin  Infusion. 1500 Unit(s)/Hr (15 mL/Hr) IV Continuous <Continuous>  insulin lispro (ADMELOG) corrective regimen sliding scale   SubCutaneous every 6 hours  losartan 50 milliGRAM(s) Oral daily  meropenem  IVPB 1000 milliGRAM(s) IV Intermittent every 12 hours  pantoprazole   Suspension 40 milliGRAM(s) Oral daily  polyethylene glycol 3350 17 Gram(s) Oral daily  potassium chloride  20 mEq/100 mL IVPB 20 milliEquivalent(s) IV Intermittent every 2 hours  senna 2 Tablet(s) Oral at bedtime  sodium bicarbonate  Injectable 100 milliEquivalent(s) IV Push once        VITALS:  T(F): 99.1 (02-08-23 @ 04:00), Max: 100 (02-07-23 @ 20:00)  HR: 63 (02-08-23 @ 07:00)  BP: 161/68 (02-08-23 @ 07:00)  RR: 43 (02-08-23 @ 07:00)  SpO2: 96% (02-08-23 @ 07:00)      02-06 @ 07:01  -  02-07 @ 07:00  --------------------------------------------------------  IN: 2560 mL / OUT: 4190 mL / NET: -1630 mL    02-07 @ 07:01  -  02-08 @ 07:00  --------------------------------------------------------  IN: 1238.9 mL / OUT: 5690 mL / NET: -4451.1 mL          PHYSICAL EXAM:  Constitutional: NAD  Neck: No JVD  Respiratory: CTAB,   Cardiovascular: S1, S2, RRR  Gastrointestinal: BS+, soft, NT/ND  Extremities: No cyanosis or clubbing. No peripheral edema  :   ya.   Skin: No rashes    LABS:  02-08    151<H>  |  106  |  84<HH>  ----------------------------<  128<H>  3.0<L>   |  36<H>  |  1.5  Creatinine Trend: 1.5<--, 1.6<--, 2.1<--, 2.2<--, 2.4<--, 2.6<--  SODIUM TREND:  Sodium 151 [02-08 @ 05:00]  Sodium 146 [02-07 @ 05:00]  Sodium 140 [02-06 @ 04:55]  Sodium 138 [02-05 @ 20:55]  Sodium 136 [02-05 @ 11:45]  Sodium 136 [02-05 @ 04:20]  Sodium 130 [02-04 @ 05:07]  Sodium 129 [02-03 @ 22:55]  Sodium 128 [02-03 @ 05:44]  Sodium 128 [02-02 @ 23:59]    Ca    10.5<H>      08 Feb 2023 05:00  Mg     2.0     02-08    TPro  4.9<L>  /  Alb  2.7<L>  /  TBili  0.7  /  DBili      /  AST  22  /  ALT  34  /  AlkPhos  111  02-08                          8.2    16.04 )-----------( 180      ( 08 Feb 2023 05:00 )             25.5       Urine Studies:        Ferritin 271      [02-06-23 @ 10:43]  PTH -- (Ca --)      [02-04-23 @ 20:22]   470  Vitamin D (25OH) <6      [02-04-23 @ 20:22]  HbA1c 5.6      [12-20-19 @ 07:09]  TSH 0.15      [02-06-23 @ 10:43]    HCV 0.08, Nonreact      [09-27-19 @ 07:37]        RADIOLOGY & ADDITIONAL STUDIES:   Advancement Flap (Double) Text: The defect edges were debeveled with a #15 scalpel blade.  Given the location of the defect and the proximity to free margins a double advancement flap was deemed most appropriate.  Using a sterile surgical marker, the appropriate advancement flaps were drawn incorporating the defect and placing the expected incisions within the relaxed skin tension lines where possible.    The area thus outlined was incised deep to adipose tissue with a #15 scalpel blade.  The skin margins were undermined to an appropriate distance in all directions utilizing iris scissors.

## 2023-02-08 NOTE — PROGRESS NOTE ADULT - ASSESSMENT
ASSESSMENT  Acute hypoxemic resp failure   s/p extubation on  BIPAP  ARDS improving  Sepsis POA  Septic shock improving  ESBL UTI  G+C bacteremia/ COAG - STAP  COVID + pneumonia  NOLA  likely ATN vs prerenal improving  Possible CAP  HO congentital unilateral kidney  HO A FIb  hypernatremia/hypokalemia     PLAN  NPO     ASSESSMENT  Acute hypoxemic resp failure   s/p extubation on  BIPAP  ARDS improving  Sepsis POA  Septic shock improving  ESBL UTI  G+C bacteremia/ COAG - STAP  COVID + pneumonia  NOLA  likely ATN vs prerenal improving  Possible CAP  HO congentital unilateral kidney  HO A FIb  hypernatremia/hypokalemia     PLAN  NPO now  will f/u

## 2023-02-08 NOTE — PROGRESS NOTE ADULT - SUBJECTIVE AND OBJECTIVE BOX
NUTRITION SUPPORT TEAM  -  PROGRESS NOTE   s/p extubation on BIPAP          REVIEW OF SYSTEMS:  Negative except as noted above.     VITALS:  T(F): 99.2 (02-08 @ 08:00), Max: 100 (02-08 @ 00:00)  HR: 65 (02-08 @ 09:00) (56 - 94)  BP: 153/77 (02-08 @ 09:00) (132/60 - 164/71)  RR: 47 (02-08 @ 09:00) (25 - 49)  SpO2: 97% (02-08 @ 09:00) (69% - 100%)    ABG - ( 07 Feb 2023 13:27 )  pH, Arterial: 7.55  pH, Blood: x     /  pCO2: 41    /  pO2: 89    / HCO3: 36    / Base Excess: 12.3  /  SaO2: 96.6    HEIGHT/WEIGHT/BMI:   Height (cm): 165.1 (02-01)  Weight (kg): 110 (01-31)  BMI (kg/m2): 40.4 (02-01)  02-07-23 @ 07:01  -  02-08-23 @ 07:00  --------------------------------------------------------  IN:    Dexmedetomidine: 8.4 mL    Enteral Tube Flush: 30 mL    FentaNYL: 16.5 mL    Heparin Infusion: 414 mL    IV PiggyBack: 300 mL    Oral Fluid: 410 mL    Peptamen A.F.: 60 mL  Total IN: 1238.9 mL    OUT:    Indwelling Catheter - Urethral (mL): 5190 mL    Rectal Tube (mL): 500 mL  Total OUT: 5690 mL  Total NET: -4451.1 mL    STANDING MEDICATIONS:   chlorhexidine 2% Cloths 1 Application(s) Topical daily  dexAMETHasone  Injectable 6 milliGRAM(s) IV Push daily  heparin  Infusion. 1500 Unit(s)/Hr IV Continuous <Continuous>  insulin lispro (ADMELOG) corrective regimen sliding scale   SubCutaneous every 6 hours  losartan 50 milliGRAM(s) Oral daily  meropenem  IVPB 1000 milliGRAM(s) IV Intermittent every 12 hours  pantoprazole   Suspension 40 milliGRAM(s) Oral daily  polyethylene glycol 3350 17 Gram(s) Oral daily  potassium chloride  20 mEq/100 mL IVPB 20 milliEquivalent(s) IV Intermittent every 2 hours  senna 2 Tablet(s) Oral at bedtime  sodium bicarbonate  Injectable 100 milliEquivalent(s) IV Push once      LABS:                         8.2    16.04 )-----------( 180      ( 08 Feb 2023 05:00 )             25.5     151<H>  |  106  |  84<HH>  ----------------------------<  128<H>          (02-08-23 @ 05:00)  3.0<L>   |  36<H>  |  1.5    Ca    10.5<H>          (02-08-23 @ 05:00)  Mg     2.0         (02-08-23 @ 05:00)    TPro  4.9<L>  /  Alb  2.7<L>  /  TBili  0.7  /  DBili  x   /  AST  22  /  ALT  34  /  AlkPhos  111       02-08-23 @ 05:00  Vitamin D, 25-Hydroxy: <6 ng/mL (02-04 @ 20:22)    Blood Glucose (Past 24 hours):  123 mg/dL (02-08 @ 05:14)  121 mg/dL (02-07 @ 23:11)  136 mg/dL (02-07 @ 17:57)  136 mg/dL (02-07 @ 12:16)      DIET:   Diet, NPO (02-08-23 @ 10:05) [Active]  RADIOLOGY:   < from: Xray Chest 1 View- PORTABLE-Routine (Xray Chest 1 View- PORTABLE-Routine in AM.) (02.08.23 @ 05:44) >  Impression:  Retrocardiac opacification. Support devices as described.  Stable in appearance.   NUTRITION SUPPORT TEAM  -  PROGRESS NOTE   s/p extubation on BIPAP  npo now      REVIEW OF SYSTEMS:  Negative except as noted above.     VITALS:  T(F): 99.2 (02-08 @ 08:00), Max: 100 (02-08 @ 00:00)  HR: 65 (02-08 @ 09:00) (56 - 94)  BP: 153/77 (02-08 @ 09:00) (132/60 - 164/71)  RR: 47 (02-08 @ 09:00) (25 - 49)  SpO2: 97% (02-08 @ 09:00) (69% - 100%)    ABG - ( 07 Feb 2023 13:27 )  pH, Arterial: 7.55  pH, Blood: x     /  pCO2: 41    /  pO2: 89    / HCO3: 36    / Base Excess: 12.3  /  SaO2: 96.6    HEIGHT/WEIGHT/BMI:   Height (cm): 165.1 (02-01)  Weight (kg): 110 (01-31)  BMI (kg/m2): 40.4 (02-01)  02-07-23 @ 07:01  -  02-08-23 @ 07:00  --------------------------------------------------------  IN:    Dexmedetomidine: 8.4 mL    Enteral Tube Flush: 30 mL    FentaNYL: 16.5 mL    Heparin Infusion: 414 mL    IV PiggyBack: 300 mL    Oral Fluid: 410 mL    Peptamen A.F.: 60 mL  Total IN: 1238.9 mL    OUT:    Indwelling Catheter - Urethral (mL): 5190 mL    Rectal Tube (mL): 500 mL  Total OUT: 5690 mL  Total NET: -4451.1 mL    STANDING MEDICATIONS:   chlorhexidine 2% Cloths 1 Application(s) Topical daily  dexAMETHasone  Injectable 6 milliGRAM(s) IV Push daily  heparin  Infusion. 1500 Unit(s)/Hr IV Continuous <Continuous>  insulin lispro (ADMELOG) corrective regimen sliding scale   SubCutaneous every 6 hours  losartan 50 milliGRAM(s) Oral daily  meropenem  IVPB 1000 milliGRAM(s) IV Intermittent every 12 hours  pantoprazole   Suspension 40 milliGRAM(s) Oral daily  polyethylene glycol 3350 17 Gram(s) Oral daily  potassium chloride  20 mEq/100 mL IVPB 20 milliEquivalent(s) IV Intermittent every 2 hours  senna 2 Tablet(s) Oral at bedtime  sodium bicarbonate  Injectable 100 milliEquivalent(s) IV Push once      LABS:                         8.2    16.04 )-----------( 180      ( 08 Feb 2023 05:00 )             25.5     151<H>  |  106  |  84<HH>  ----------------------------<  128<H>          (02-08-23 @ 05:00)  3.0<L>   |  36<H>  |  1.5    Ca    10.5<H>          (02-08-23 @ 05:00)  Mg     2.0         (02-08-23 @ 05:00)    TPro  4.9<L>  /  Alb  2.7<L>  /  TBili  0.7  /  DBili  x   /  AST  22  /  ALT  34  /  AlkPhos  111       02-08-23 @ 05:00  Vitamin D, 25-Hydroxy: <6 ng/mL (02-04 @ 20:22)    Blood Glucose (Past 24 hours):  123 mg/dL (02-08 @ 05:14)  121 mg/dL (02-07 @ 23:11)  136 mg/dL (02-07 @ 17:57)  136 mg/dL (02-07 @ 12:16)      DIET:   Diet, NPO (02-08-23 @ 10:05) [Active]  RADIOLOGY:   < from: Xray Chest 1 View- PORTABLE-Routine (Xray Chest 1 View- PORTABLE-Routine in AM.) (02.08.23 @ 05:44) >  Impression:  Retrocardiac opacification. Support devices as described.  Stable in appearance.

## 2023-02-08 NOTE — SWALLOW FEES ASSESSMENT ADULT - COMMENTS
Pts neck posture is sig for a R sided head tilt w/ poor head/neck and trunk support. +generalized weakness and fatigue

## 2023-02-08 NOTE — PROGRESS NOTE ADULT - ASSESSMENT
IMPRESSION:    Acute hypoxemic resp failure sp extubation on  BIPAP  ARDS improving  Sepsis POA  Septic shock improving  ESBL UTI  G+C bacteremia/ COAG - STAP  COVID + pneumonia  NOLA  likely ATN vs prerenal improving  Possible CAP  HO congentital unilateral kidney  HO A FIb      PLAN:    CNS: Avoid CNS depressant    HEENT: Oral care    PULMONARY:  HOB @ 45 degrees. aspiration precaution, BIPAP at night, NC trial during day, keep SaO2 O2 TO 96%    CARDIOVASCULAR: Echo reviewed, avoid overload    GI: GI prophylaxis. speech and swallow eval    RENAL:  Follow up lytes.  Correct as needed.     INFECTIOUS DISEASE: Finish course of abx Dexamethasone for COVID    HEMATOLOGICAL: patient has a fib not on ac, heparin gtt    ENDOCRINE:  Follow up FS.  Insulin protocol if needed.    MUSCULOSKELETAL: PT/ OT/ OOBTC    MICU.  L IJ/ ya 2/2 IMPRESSION:    Acute hypoxemic resp failure sp extubation on  BIPAP  ARDS improving  Sepsis POA  Septic shock improving  ESBL UTI  G+C bacteremia/ COAG - STAP  COVID + pneumonia  NOLA  likely ATN vs prerenal improving  Possible CAP  HO congentital unilateral kidney  HO A FIb      PLAN:    CNS: Avoid CNS depressant    HEENT: Oral care    PULMONARY:  HOB @ 45 degrees. aspiration precaution, BIPAP at night, NC trial during day, keep SaO2 O2 TO 96%    CARDIOVASCULAR: Echo reviewed, avoid overload    GI: GI prophylaxis. speech and swallow eval    RENAL:  Follow up lytes.  Correct as needed.     INFECTIOUS DISEASE: Finish course of abx Dexamethasone for COVID    HEMATOLOGICAL: patient has a fib not on ac, heparin gtt, change to DOAC    ENDOCRINE:  Follow up FS.  Insulin protocol if needed.    MUSCULOSKELETAL: PT/ OT/ OOBTC    MICU.  L NATTY/ felton STINSON

## 2023-02-08 NOTE — CHART NOTE - NSCHARTNOTEFT_GEN_A_CORE
Patient was extubated yesterday 2/7 to is now on continuous BiPaP. Patient admitted from [facility]. Palliative Care consulted for high LACe Score. Patient remains acute. SW/CM will continue to follow for discharge planning needs.

## 2023-02-08 NOTE — PROGRESS NOTE ADULT - ASSESSMENT
PLAN    #Acute hypoxemic resp failure still intubated on 50%/ 16  #ARDS  #Sepsis POA  #Possible CAP  #COVID +  - MRSA (-), Strep (-), Legionella (-) on 2/1   - Covid + on 1/31/22  - CT Chest No Cont (01.31.23 @ 21:17):  Mild right pleural effusion with adjacent  atelectasis. Bibasilar atelectatic changes versus scarring. Partial obstruction of the distal right lower lobe bronchioles. Otherwise no  evidence of central endobronchial obstruction. No focal consolidation.  Right upper lobe pulmonary cyst (4-130). No pleural effusion. No  pneumothorax.  - Xray Chest 1 View-PORTABLE IMMEDIATE (Xray Chest 1 View-PORTABLE IMMEDIATE .) (02.01.23 @ 06:31):  Worsening opacifications and effusions. Support devices as described.  - No RDV given as patient was out of the window and had NOLA earlier in the hospital course   - c/w dexamethasone 6 mg q24h ( 2/1 -2/2, 2/4- current) for 10 days   - No TOCI given as patient had positive blood cultures   - D-Dimer 818, Ferritin 347, LDH (203 --> 139), CRP (107.1 --> 42), procal ( 14.3 -->37.2 --> 49.6)   - daily CXR  - daily ABG       #Septic shock  #ESBL UTI  #G+C bacteremia/ COAG - STAP  - UA 1/31 showed LE+, WBC+, Bacteria+, Epithelial cells 27+  - UCx: proteus mirabilis ESBL   - BCx: staph epidermidis mehicillin resistant ( most likely contaminanant as per ID)   - s/p Levaquin ( 1/31 - 2/3), Rocephin ( 2/1-2/3),   - c/w on Meropenem 500 mg q24h ( 2/3 - current)   - ID following     #NOLA  likely ATN vs prerenal improving  #H/O congenital unilateral kidney  #Hypercalcemia   - Renal US 2/4/23: No right hydronephrosis; left kidney congenitally absent   - Creat trending down ( 5.1 --> 2.1 --> 1.6)   - strict i/o  - Corrected Ca around 11/ PTH mediated; needs hypercalcemia workup once extubated   - no need for RRT at this time   - Renally dose medications   - nephrology following   - Lasix 40 IV once 2/7    #H/O A Fib  - rate controlled   - currently on a heparin drip for anticoagulation   - monitor pTT     #Low TSH; 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 Bibasilar atelectatic changes versus scarring. Partial obstruction of the distal right lower lobe bronchioles. Otherwise no  evidence of central endobronchial obstruction. No focal consolidation.  Right upper lobe pulmonary cyst (4-130). No pleural effusion. No  pneumothorax.  - Xray Chest 1 View-PORTABLE IMMEDIATE (Xray Chest 1 View-PORTABLE IMMEDIATE .) (02.01.23 @ 06:31):  Worsening opacifications and effusions. Support devices as described.  - No RDV given as patient was out of the window and had NOLA earlier in the hospital course   - c/w dexamethasone 6 mg q24h ( 2/1 -2/2, 2/4- current) for 10 days   - No TOCI given as patient had positive blood cultures   - D-Dimer 818, Ferritin 347, LDH (203 --> 139), CRP (107.1 --> 42), procal ( 14.3 -->37.2 --> 49.6)   - daily CXR  - daily ABG   - extubated 2/7, tolerated BIPAP      #Septic shock  #ESBL UTI  #G+C bacteremia/ COAG - STAP  - UA 1/31 showed LE+, WBC+, Bacteria+, Epithelial cells 27+  - UCx: proteus mirabilis ESBL   - BCx: staph epidermidis mehicillin resistant ( most likely contaminanant as per ID)   - s/p Levaquin ( 1/31 - 2/3), Rocephin ( 2/1-2/3),   - c/w on Meropenem 500 mg q24h ( 2/3 - current)   - ID following     #NOLA  likely ATN vs prerenal improving  #H/O congenital unilateral kidney  #Hypercalcemia   - Renal US 2/4/23: No right hydronephrosis; left kidney congenitally absent   - Creat trending down ( 5.1 --> 2.1 --> 1.6)   - strict i/o  - Corrected Ca around 11/ PTH mediated; needs hypercalcemia workup once extubated   - no need for RRT at this time   - Renally dose medications   - nephrology following   - Lasix 40 IV once 2/7    #H/O A Fib  - rate controlled   - currently on a heparin drip for anticoagulation   - monitor pTT     #Low TSH; 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 Bibasilar atelectatic changes versus scarring. Partial obstruction of the distal right lower lobe bronchioles. Otherwise no  evidence of central endobronchial obstruction. No focal consolidation.  Right upper lobe pulmonary cyst (4-130). No pleural effusion. No  pneumothorax.  - Xray Chest 1 View-PORTABLE IMMEDIATE (Xray Chest 1 View-PORTABLE IMMEDIATE .) (02.01.23 @ 06:31):  Worsening opacifications and effusions. Support devices as described.  - No RDV given as patient was out of the window and had NOLA earlier in the hospital course   - c/w dexamethasone 6 mg q24h ( 2/1 -2/2, 2/4- current) for 10 days   - No TOCI given as patient had positive blood cultures   - D-Dimer 818, Ferritin 347, LDH (203 --> 139), CRP (107.1 --> 42), procal ( 14.3 -->37.2 --> 49.6)   - daily CXR  - daily ABG   - extubated 2/7, tolerated BIPAP      #Septic shock  #ESBL UTI  #G+C bacteremia/ COAG - STAP  - UA 1/31 showed LE+, WBC+, Bacteria+, Epithelial cells 27+  - UCx: proteus mirabilis ESBL   - BCx: staph epidermidis mehicillin resistant ( most likely contaminanant as per ID)   - s/p Levaquin ( 1/31 - 2/3), Rocephin ( 2/1-2/3),   - c/w on Meropenem 500 mg q24h ( 2/3 - current)   - ID following     #NOLA  likely ATN vs prerenal improving  #H/O congenital unilateral kidney  #Hypercalcemia   - Renal US 2/4/23: No right hydronephrosis; left kidney congenitally absent   - Creat trending down ( 5.1 --> 2.1 --> 1.6)   - strict i/o  - Corrected Ca around 11/ PTH mediated; needs hypercalcemia workup once extubated   - no need for RRT at this time   - Renally dose medications   - nephrology following   - Lasix 40 IV once 2/7    #H/O A Fib  - rate controlled   - DC heparin drip, started therapeutic lovenox   - monitor pTT     #Low TSH; 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 Bibasilar atelectatic changes versus scarring. Partial obstruction of the distal right lower lobe bronchioles. Otherwise no  evidence of central endobronchial obstruction. No focal consolidation.  Right upper lobe pulmonary cyst (4-130). No pleural effusion. No  pneumothorax.  - Xray Chest 1 View-PORTABLE IMMEDIATE (Xray Chest 1 View-PORTABLE IMMEDIATE .) (02.01.23 @ 06:31):  Worsening opacifications and effusions. Support devices as described.  - No RDV given as patient was out of the window and had NOLA earlier in the hospital course   - c/w dexamethasone 6 mg q24h ( 2/1 -2/2, 2/4- current) for 10 days   - No TOCI given as patient had positive blood cultures   - D-Dimer 818, Ferritin 347, LDH (203 --> 139), CRP (107.1 --> 42), procal ( 14.3 -->37.2 --> 49.6)   - daily CXR  - daily ABG   - extubated 2/7, tolerated BIPAP      #Septic shock  #ESBL UTI  #G+C bacteremia/ COAG - STAP  - UA 1/31 showed LE+, WBC+, Bacteria+, Epithelial cells 27+  - UCx: proteus mirabilis ESBL   - BCx: staph epidermidis mehicillin resistant ( most likely contaminanant as per ID)   - s/p Levaquin ( 1/31 - 2/3), Rocephin ( 2/1-2/3),   - c/w on Meropenem 500 mg q24h ( 2/3 - current)   - ID following     #NOLA  likely ATN vs prerenal improving  #H/O congenital unilateral kidney  #Hypercalcemia   - Renal US 2/4/23: No right hydronephrosis; left kidney congenitally absent   - Creat trending down ( 5.1 --> 2.1 --> 1.6)   - strict i/o  - Corrected Ca around 11/ PTH mediated; needs hypercalcemia workup once extubated   - no need for RRT at this time   - Renally dose medications   - nephrology following   - Lasix 40 IV once 2/7    #H/O A Fib  - rate controlled   - DC heparin drip, started therapeutic Lovenox     #Low TSH; 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

## 2023-02-08 NOTE — PROGRESS NOTE ADULT - ASSESSMENT
NOLA / last creat 0.9 on 1/3/23 per HIE / hx of solitary kidney   Likely ATN (hypotension, COVID19/ARDS) / CRS / heavy NSAID use  Hyponatremia improving with iv hydration  HAGMA d/t renal failure / r/o lactic acidosis/DKA  Acute respiratory failure / pulmonary edema / COVID19 / ARDS   Septic shock on pressors     - non oliguric / polyuric post ATN / hypernatremic now   - creat trending down    - on MV  - strict i/o   - corrected calcium around 11 / PTH mediated, will need w/u / also vit D deficient   -  last ph noted, repeat  - increase meropenem to q 12   - hypertrophic right kidney on CT / 2/4 renal ultrasound reviewed, left kidney not visualized, right kidney large w/o hydronephrosis  - increase free water and start 1/2 NS at 100 cc per hour  - follow BMP tonight and in AM     will follow

## 2023-02-09 LAB
ALBUMIN SERPL ELPH-MCNC: 3 G/DL — LOW (ref 3.5–5.2)
ALP SERPL-CCNC: 106 U/L — SIGNIFICANT CHANGE UP (ref 30–115)
ALT FLD-CCNC: 35 U/L — SIGNIFICANT CHANGE UP (ref 0–41)
ANION GAP SERPL CALC-SCNC: 10 MMOL/L — SIGNIFICANT CHANGE UP (ref 7–14)
ANION GAP SERPL CALC-SCNC: 9 MMOL/L — SIGNIFICANT CHANGE UP (ref 7–14)
AST SERPL-CCNC: 18 U/L — SIGNIFICANT CHANGE UP (ref 0–41)
BASOPHILS # BLD AUTO: 0.04 K/UL — SIGNIFICANT CHANGE UP (ref 0–0.2)
BASOPHILS NFR BLD AUTO: 0.2 % — SIGNIFICANT CHANGE UP (ref 0–1)
BILIRUB SERPL-MCNC: 0.7 MG/DL — SIGNIFICANT CHANGE UP (ref 0.2–1.2)
BUN SERPL-MCNC: 58 MG/DL — HIGH (ref 10–20)
BUN SERPL-MCNC: 62 MG/DL — CRITICAL HIGH (ref 10–20)
BUN SERPL-MCNC: 66 MG/DL — CRITICAL HIGH (ref 10–20)
BUN SERPL-MCNC: 70 MG/DL — CRITICAL HIGH (ref 10–20)
CALCIUM SERPL-MCNC: 10.6 MG/DL — HIGH (ref 8.4–10.4)
CALCIUM SERPL-MCNC: 10.8 MG/DL — HIGH (ref 8.4–10.5)
CALCIUM SERPL-MCNC: 11 MG/DL — HIGH (ref 8.4–10.5)
CALCIUM SERPL-MCNC: 11 MG/DL — HIGH (ref 8.4–10.5)
CHLORIDE SERPL-SCNC: 106 MMOL/L — SIGNIFICANT CHANGE UP (ref 98–110)
CHLORIDE SERPL-SCNC: 107 MMOL/L — SIGNIFICANT CHANGE UP (ref 98–110)
CHLORIDE SERPL-SCNC: 109 MMOL/L — SIGNIFICANT CHANGE UP (ref 98–110)
CHLORIDE SERPL-SCNC: 111 MMOL/L — HIGH (ref 98–110)
CO2 SERPL-SCNC: 34 MMOL/L — HIGH (ref 17–32)
CREAT SERPL-MCNC: 1.1 MG/DL — SIGNIFICANT CHANGE UP (ref 0.7–1.5)
CREAT SERPL-MCNC: 1.2 MG/DL — SIGNIFICANT CHANGE UP (ref 0.7–1.5)
CREAT SERPL-MCNC: 1.2 MG/DL — SIGNIFICANT CHANGE UP (ref 0.7–1.5)
CREAT SERPL-MCNC: 1.3 MG/DL — SIGNIFICANT CHANGE UP (ref 0.7–1.5)
EGFR: 44 ML/MIN/1.73M2 — LOW
EGFR: 49 ML/MIN/1.73M2 — LOW
EGFR: 49 ML/MIN/1.73M2 — LOW
EGFR: 54 ML/MIN/1.73M2 — LOW
EOSINOPHIL # BLD AUTO: 0.05 K/UL — SIGNIFICANT CHANGE UP (ref 0–0.7)
EOSINOPHIL NFR BLD AUTO: 0.3 % — SIGNIFICANT CHANGE UP (ref 0–8)
GAS PNL BLDA: SIGNIFICANT CHANGE UP
GLUCOSE BLDC GLUCOMTR-MCNC: 128 MG/DL — HIGH (ref 70–99)
GLUCOSE BLDC GLUCOMTR-MCNC: 174 MG/DL — HIGH (ref 70–99)
GLUCOSE BLDC GLUCOMTR-MCNC: 184 MG/DL — HIGH (ref 70–99)
GLUCOSE SERPL-MCNC: 127 MG/DL — HIGH (ref 70–99)
GLUCOSE SERPL-MCNC: 153 MG/DL — HIGH (ref 70–99)
GLUCOSE SERPL-MCNC: 186 MG/DL — HIGH (ref 70–99)
GLUCOSE SERPL-MCNC: 204 MG/DL — HIGH (ref 70–99)
HCT VFR BLD CALC: 27.7 % — LOW (ref 37–47)
HGB BLD-MCNC: 8.7 G/DL — LOW (ref 12–16)
IMM GRANULOCYTES NFR BLD AUTO: 4.4 % — HIGH (ref 0.1–0.3)
LYMPHOCYTES # BLD AUTO: 1.59 K/UL — SIGNIFICANT CHANGE UP (ref 1.2–3.4)
LYMPHOCYTES # BLD AUTO: 8.8 % — LOW (ref 20.5–51.1)
MAGNESIUM SERPL-MCNC: 1.7 MG/DL — LOW (ref 1.8–2.4)
MCHC RBC-ENTMCNC: 28.7 PG — SIGNIFICANT CHANGE UP (ref 27–31)
MCHC RBC-ENTMCNC: 31.4 G/DL — LOW (ref 32–37)
MCV RBC AUTO: 91.4 FL — SIGNIFICANT CHANGE UP (ref 81–99)
MONOCYTES # BLD AUTO: 1.88 K/UL — HIGH (ref 0.1–0.6)
MONOCYTES NFR BLD AUTO: 10.4 % — HIGH (ref 1.7–9.3)
NEUTROPHILS # BLD AUTO: 13.64 K/UL — HIGH (ref 1.4–6.5)
NEUTROPHILS NFR BLD AUTO: 75.9 % — HIGH (ref 42.2–75.2)
NRBC # BLD: 0 /100 WBCS — SIGNIFICANT CHANGE UP (ref 0–0)
PHOSPHATE SERPL-MCNC: 2.7 MG/DL — SIGNIFICANT CHANGE UP (ref 2.1–4.9)
PLATELET # BLD AUTO: 199 K/UL — SIGNIFICANT CHANGE UP (ref 130–400)
POTASSIUM SERPL-MCNC: 3.2 MMOL/L — LOW (ref 3.5–5)
POTASSIUM SERPL-MCNC: 3.4 MMOL/L — LOW (ref 3.5–5)
POTASSIUM SERPL-MCNC: 3.6 MMOL/L — SIGNIFICANT CHANGE UP (ref 3.5–5)
POTASSIUM SERPL-MCNC: 3.7 MMOL/L — SIGNIFICANT CHANGE UP (ref 3.5–5)
POTASSIUM SERPL-SCNC: 3.2 MMOL/L — LOW (ref 3.5–5)
POTASSIUM SERPL-SCNC: 3.4 MMOL/L — LOW (ref 3.5–5)
POTASSIUM SERPL-SCNC: 3.6 MMOL/L — SIGNIFICANT CHANGE UP (ref 3.5–5)
POTASSIUM SERPL-SCNC: 3.7 MMOL/L — SIGNIFICANT CHANGE UP (ref 3.5–5)
PROT SERPL-MCNC: 5 G/DL — LOW (ref 6–8)
RBC # BLD: 3.03 M/UL — LOW (ref 4.2–5.4)
RBC # FLD: 13.9 % — SIGNIFICANT CHANGE UP (ref 11.5–14.5)
SODIUM SERPL-SCNC: 149 MMOL/L — HIGH (ref 135–146)
SODIUM SERPL-SCNC: 151 MMOL/L — HIGH (ref 135–146)
SODIUM SERPL-SCNC: 152 MMOL/L — HIGH (ref 135–146)
SODIUM SERPL-SCNC: 154 MMOL/L — HIGH (ref 135–146)
WBC # BLD: 18 K/UL — HIGH (ref 4.8–10.8)
WBC # FLD AUTO: 18 K/UL — HIGH (ref 4.8–10.8)

## 2023-02-09 PROCEDURE — 71045 X-RAY EXAM CHEST 1 VIEW: CPT | Mod: 26

## 2023-02-09 PROCEDURE — 99233 SBSQ HOSP IP/OBS HIGH 50: CPT

## 2023-02-09 PROCEDURE — 71045 X-RAY EXAM CHEST 1 VIEW: CPT | Mod: 26,77

## 2023-02-09 RX ORDER — SODIUM CHLORIDE 9 MG/ML
1000 INJECTION, SOLUTION INTRAVENOUS
Refills: 0 | Status: DISCONTINUED | OUTPATIENT
Start: 2023-02-09 | End: 2023-02-11

## 2023-02-09 RX ORDER — POTASSIUM CHLORIDE 20 MEQ
40 PACKET (EA) ORAL ONCE
Refills: 0 | Status: COMPLETED | OUTPATIENT
Start: 2023-02-09 | End: 2023-02-09

## 2023-02-09 RX ORDER — AMLODIPINE BESYLATE 2.5 MG/1
10 TABLET ORAL DAILY
Refills: 0 | Status: DISCONTINUED | OUTPATIENT
Start: 2023-02-09 | End: 2023-02-09

## 2023-02-09 RX ORDER — HYDRALAZINE HCL 50 MG
5 TABLET ORAL ONCE
Refills: 0 | Status: COMPLETED | OUTPATIENT
Start: 2023-02-09 | End: 2023-02-09

## 2023-02-09 RX ORDER — PANTOPRAZOLE SODIUM 20 MG/1
8 TABLET, DELAYED RELEASE ORAL
Qty: 80 | Refills: 0 | Status: DISCONTINUED | OUTPATIENT
Start: 2023-02-09 | End: 2023-02-10

## 2023-02-09 RX ORDER — LABETALOL HCL 100 MG
100 TABLET ORAL ONCE
Refills: 0 | Status: COMPLETED | OUTPATIENT
Start: 2023-02-09 | End: 2023-02-09

## 2023-02-09 RX ORDER — MAGNESIUM SULFATE 500 MG/ML
2 VIAL (ML) INJECTION
Refills: 0 | Status: COMPLETED | OUTPATIENT
Start: 2023-02-09 | End: 2023-02-09

## 2023-02-09 RX ORDER — LABETALOL HCL 100 MG
100 TABLET ORAL EVERY 8 HOURS
Refills: 0 | Status: DISCONTINUED | OUTPATIENT
Start: 2023-02-09 | End: 2023-02-09

## 2023-02-09 RX ORDER — DEXMEDETOMIDINE HYDROCHLORIDE IN 0.9% SODIUM CHLORIDE 4 UG/ML
0.2 INJECTION INTRAVENOUS
Qty: 400 | Refills: 0 | Status: DISCONTINUED | OUTPATIENT
Start: 2023-02-09 | End: 2023-02-11

## 2023-02-09 RX ORDER — HYDRALAZINE HCL 50 MG
10 TABLET ORAL ONCE
Refills: 0 | Status: COMPLETED | OUTPATIENT
Start: 2023-02-09 | End: 2023-02-09

## 2023-02-09 RX ORDER — CHLORHEXIDINE GLUCONATE 213 G/1000ML
15 SOLUTION TOPICAL EVERY 12 HOURS
Refills: 0 | Status: DISCONTINUED | OUTPATIENT
Start: 2023-02-09 | End: 2023-02-12

## 2023-02-09 RX ORDER — FENTANYL CITRATE 50 UG/ML
0.5 INJECTION INTRAVENOUS
Qty: 2500 | Refills: 0 | Status: DISCONTINUED | OUTPATIENT
Start: 2023-02-09 | End: 2023-02-11

## 2023-02-09 RX ORDER — FENTANYL CITRATE 50 UG/ML
0.5 INJECTION INTRAVENOUS
Qty: 5000 | Refills: 0 | Status: DISCONTINUED | OUTPATIENT
Start: 2023-02-09 | End: 2023-02-09

## 2023-02-09 RX ADMIN — Medication 2: at 18:16

## 2023-02-09 RX ADMIN — Medication 5 MILLIGRAM(S): at 04:17

## 2023-02-09 RX ADMIN — Medication 5 MILLIGRAM(S): at 00:12

## 2023-02-09 RX ADMIN — MEROPENEM 100 MILLIGRAM(S): 1 INJECTION INTRAVENOUS at 22:52

## 2023-02-09 RX ADMIN — ENOXAPARIN SODIUM 110 MILLIGRAM(S): 100 INJECTION SUBCUTANEOUS at 13:39

## 2023-02-09 RX ADMIN — Medication 650 MILLIGRAM(S): at 18:30

## 2023-02-09 RX ADMIN — LOSARTAN POTASSIUM 50 MILLIGRAM(S): 100 TABLET, FILM COATED ORAL at 05:01

## 2023-02-09 RX ADMIN — MEROPENEM 100 MILLIGRAM(S): 1 INJECTION INTRAVENOUS at 05:03

## 2023-02-09 RX ADMIN — MAGNESIUM OXIDE 400 MG ORAL TABLET 400 MILLIGRAM(S): 241.3 TABLET ORAL at 13:40

## 2023-02-09 RX ADMIN — CHLORHEXIDINE GLUCONATE 1 APPLICATION(S): 213 SOLUTION TOPICAL at 17:57

## 2023-02-09 RX ADMIN — Medication 100 MILLIGRAM(S): at 10:33

## 2023-02-09 RX ADMIN — SODIUM CHLORIDE 150 MILLILITER(S): 9 INJECTION, SOLUTION INTRAVENOUS at 05:38

## 2023-02-09 RX ADMIN — PANTOPRAZOLE SODIUM 40 MILLIGRAM(S): 20 TABLET, DELAYED RELEASE ORAL at 13:39

## 2023-02-09 RX ADMIN — Medication 40 MILLIEQUIVALENT(S): at 05:02

## 2023-02-09 RX ADMIN — MEROPENEM 100 MILLIGRAM(S): 1 INJECTION INTRAVENOUS at 13:40

## 2023-02-09 RX ADMIN — Medication 6 MILLIGRAM(S): at 05:01

## 2023-02-09 RX ADMIN — Medication 40 MILLIEQUIVALENT(S): at 23:03

## 2023-02-09 RX ADMIN — Medication 40 MILLIEQUIVALENT(S): at 10:32

## 2023-02-09 RX ADMIN — Medication 2: at 12:39

## 2023-02-09 RX ADMIN — DEXMEDETOMIDINE HYDROCHLORIDE IN 0.9% SODIUM CHLORIDE 5.5 MICROGRAM(S)/KG/HR: 4 INJECTION INTRAVENOUS at 23:27

## 2023-02-09 RX ADMIN — Medication 100 MILLIGRAM(S): at 13:40

## 2023-02-09 RX ADMIN — Medication 25 GRAM(S): at 05:33

## 2023-02-09 RX ADMIN — AMLODIPINE BESYLATE 10 MILLIGRAM(S): 2.5 TABLET ORAL at 05:01

## 2023-02-09 RX ADMIN — Medication 10 MILLIGRAM(S): at 20:27

## 2023-02-09 RX ADMIN — PANTOPRAZOLE SODIUM 10 MG/HR: 20 TABLET, DELAYED RELEASE ORAL at 23:03

## 2023-02-09 RX ADMIN — FENTANYL CITRATE 5.5 MICROGRAM(S)/KG/HR: 50 INJECTION INTRAVENOUS at 23:26

## 2023-02-09 RX ADMIN — ENOXAPARIN SODIUM 110 MILLIGRAM(S): 100 INJECTION SUBCUTANEOUS at 01:12

## 2023-02-09 RX ADMIN — Medication 650 MILLIGRAM(S): at 18:00

## 2023-02-09 RX ADMIN — Medication 25 GRAM(S): at 08:13

## 2023-02-09 NOTE — OCCUPATIONAL THERAPY INITIAL EVALUATION ADULT - LEVEL OF INDEPENDENCE: SUPINE/SIT, REHAB EVAL
pt unable to assist with trunk or mobility enough to attempt supine to sit, utilized bed in chair mode in prep for OOB to chair

## 2023-02-09 NOTE — PROGRESS NOTE ADULT - ASSESSMENT
71 yo female kth afib, htn, solitary kidney presented from [facility] for SOB with COVID four days prior to admission     #Acute Hypoxic Respiratory Failure, extubated 2/7  #ARDS  - CT Chest No Cont (01.31.23 @ 21:17):  Mild right pleural effusion with adjacent  atelectasis. Bibasilar atelectatic changes versus scarring. Partial   obstruction of the distal right lower lobe bronchioles. Otherwise no  evidence of central endobronchial obstruction. No focal consolidation.  Right upper lobe pulmonary cyst (4-130). No pleural effusion. No  pneumothorax.  - Xray Chest 1 View-PORTABLE IMMEDIATE (Xray Chest 1 View-PORTABLE IMMEDIATE .) (02.01.23 @ 06:31):  Worsening opacifications and effusions. Support devices as described.    #Septic Shock secondary to COVID/UTI   - Blood Cx 1/31 Coag NEGATIVE staph - likely contaminant     #Proteus ESBL UTI - Urine Cx 1/31     #COVID-19 severe  #NOLA     #Solitary Kidney     Recommendations  - continue meropenem 500 mg q 24 hours -- can stop on 2/12 to complete 10 day course of meropenem   - aspiration precautions     Please call or message on [employer] Teams if with any questions.  Spectra [phone]

## 2023-02-09 NOTE — PROGRESS NOTE ADULT - SUBJECTIVE AND OBJECTIVE BOX
Patient is a 72y old  Female who presents with a chief complaint of respiratory distress (09 Feb 2023 09:05)      INTERVAL HPI/OVERNIGHT EVENTS:   Sodium 154, started d5 150cc/hr and free water 200cc q8h. BP >200, gave hydralazine 5mg twice and amlodipine 10mg once. Afebrile   Extubated 2/7, now on 4L NC satting 95%    ICU Vital Signs Last 24 Hrs  T(C): 37.4 (09 Feb 2023 08:00), Max: 37.4 (08 Feb 2023 16:00)  T(F): 99.4 (09 Feb 2023 08:00), Max: 99.4 (08 Feb 2023 16:00)  HR: 82 (09 Feb 2023 10:00) (61 - 100)  BP: 173/73 (09 Feb 2023 10:00) (150/63 - 207/80)  BP(mean): 105 (09 Feb 2023 10:00) (87 - 131)  ABP: --  ABP(mean): --  RR: 37 (09 Feb 2023 10:00) (26 - 68)  SpO2: 96% (09 Feb 2023 10:00) (95% - 100%)    O2 Parameters below as of 09 Feb 2023 08:00  Patient On (Oxygen Delivery Method): nasal cannula  O2 Flow (L/min): 4        I&O's Summary    08 Feb 2023 07:01  -  09 Feb 2023 07:00  --------------------------------------------------------  IN: 3105 mL / OUT: 3750 mL / NET: -645 mL    09 Feb 2023 07:01  -  09 Feb 2023 10:56  --------------------------------------------------------  IN: 870 mL / OUT: 200 mL / NET: 670 mL          LABS:                        8.7    18.00 )-----------( 199      ( 09 Feb 2023 03:49 )             27.7     02-09    154<H>  |  111<H>  |  70<HH>  ----------------------------<  127<H>  3.2<L>   |  34<H>  |  1.2    Ca    10.8<H>      09 Feb 2023 03:49  Phos  2.7     02-09  Mg     1.7     02-09    TPro  5.0<L>  /  Alb  3.0<L>  /  TBili  0.7  /  DBili  x   /  AST  18  /  ALT  35  /  AlkPhos  106  02-09    PTT - ( 08 Feb 2023 05:00 )  PTT:190.5 sec    CAPILLARY BLOOD GLUCOSE      POCT Blood Glucose.: 128 mg/dL (09 Feb 2023 05:07)  POCT Blood Glucose.: 125 mg/dL (08 Feb 2023 23:03)  POCT Blood Glucose.: 133 mg/dL (08 Feb 2023 17:27)  POCT Blood Glucose.: 142 mg/dL (08 Feb 2023 13:17)    ABG - ( 07 Feb 2023 13:27 )  pH, Arterial: 7.55  pH, Blood: x     /  pCO2: 41    /  pO2: 89    / HCO3: 36    / Base Excess: 12.3  /  SaO2: 96.6                RADIOLOGY & ADDITIONAL TESTS:    Consultant(s) Notes Reviewed:  [x ] YES  [ ] NO    MEDICATIONS  (STANDING):  amLODIPine   Tablet 10 milliGRAM(s) Oral daily  chlorhexidine 2% Cloths 1 Application(s) Topical daily  dexAMETHasone  Injectable 6 milliGRAM(s) IV Push daily  dextrose 5%. 1000 milliLiter(s) (150 mL/Hr) IV Continuous <Continuous>  enoxaparin Injectable 110 milliGRAM(s) SubCutaneous every 12 hours  insulin lispro (ADMELOG) corrective regimen sliding scale   SubCutaneous every 6 hours  labetalol 100 milliGRAM(s) Oral every 8 hours  losartan 50 milliGRAM(s) Oral daily  magnesium oxide 400 milliGRAM(s) Oral daily  meropenem  IVPB      meropenem  IVPB 1000 milliGRAM(s) IV Intermittent every 8 hours  pantoprazole  Injectable 40 milliGRAM(s) IV Push daily  polyethylene glycol 3350 17 Gram(s) Oral daily  senna 2 Tablet(s) Oral at bedtime  sodium bicarbonate  Injectable 100 milliEquivalent(s) IV Push once    MEDICATIONS  (PRN):  acetaminophen  Suppository .. 650 milliGRAM(s) Rectal every 4 hours PRN Temp greater or equal to 38.5C (101.3F)      PHYSICAL EXAM:  GENERAL: resting comfortably   HEAD:  Atraumatic, Normocephalic  EYES: EOMI, PERRLA, conjunctiva and sclera clear  NECK: Supple, No JVD, Normal thyroid, no enlarged nodes  NERVOUS SYSTEM:  Alert & Awake. Following commands  CHEST/LUNG: Tachypneic; B/L good air entry; No rales, rhonchi, or wheezing  HEART: S1S2 normal, no S3, Regular rate and rhythm; No murmurs  ABDOMEN: Soft, Nontender, Nondistended; Bowel sounds present  EXTREMITIES:  2+ Peripheral Pulses, No clubbing, cyanosis, or edema  LYMPH: No lymphadenopathy noted  SKIN: No rashes or lesions    Care Discussed with Consultants/Other Providers [ x] YES  [ ] NO

## 2023-02-09 NOTE — OCCUPATIONAL THERAPY INITIAL EVALUATION ADULT - PLANNED THERAPY INTERVENTIONS, OT EVAL
ADL retraining/balance training/bed mobility training/cognitive, visual perceptual/fine motor coordination training/neuromuscular re-education/parent/caregiver training.../ROM/strengthening/stretching/transfer training

## 2023-02-09 NOTE — PROGRESS NOTE ADULT - ASSESSMENT
NOLA / last creat 0.9 on 1/3/23 per HIE / hx of solitary kidney   Likely ATN (hypotension, COVID19/ARDS) / CRS / heavy NSAID use  Hyponatremia improving with iv hydration  HAGMA d/t renal failure / r/o lactic acidosis/DKA  Acute respiratory failure / pulmonary edema / COVID19 / ARDS   Septic shock on pressors   - UO noted   - creat trending down    - if bp remains elevated , increase labetalol to 200 q 8   - corrected calcium around 11.6 / PTH mediated, will need w/u / also vit D deficient   - hypertrophic right kidney on CT / 2/4 renal ultrasound reviewed, left kidney not visualized, right kidney large w/o hydronephrosis  - increase free water and continue  iv fluids  100 cc per hour    will follow

## 2023-02-09 NOTE — OCCUPATIONAL THERAPY INITIAL EVALUATION ADULT - GENERAL OBSERVATIONS, REHAB EVAL
Pt received semi pierce in bed in NAD agreeable to OT evaluation received with head noted in R lateral flexion and Rn noted pt consistently maintains thi sposition, +tele, +BP cuff, +pulse oxi,, left in bed I nchair mode, son present to end session, /85 RN aware and dispensing meds

## 2023-02-09 NOTE — PROGRESS NOTE ADULT - SUBJECTIVE AND OBJECTIVE BOX
seen and examined   24 h events noted         PAST HISTORY  --------------------------------------------------------------------------------  No significant changes to PMH, PSH, FHx, SHx, unless otherwise noted    ALLERGIES & MEDICATIONS  --------------------------------------------------------------------------------  Allergies    No Known Allergies    Intolerances      Standing Inpatient Medications  amLODIPine   Tablet 10 milliGRAM(s) Oral daily  chlorhexidine 2% Cloths 1 Application(s) Topical daily  dexAMETHasone  Injectable 6 milliGRAM(s) IV Push daily  dextrose 5%. 1000 milliLiter(s) IV Continuous <Continuous>  enoxaparin Injectable 110 milliGRAM(s) SubCutaneous every 12 hours  insulin lispro (ADMELOG) corrective regimen sliding scale   SubCutaneous every 6 hours  labetalol 100 milliGRAM(s) Oral every 8 hours  labetalol 100 milliGRAM(s) Oral once  losartan 50 milliGRAM(s) Oral daily  magnesium oxide 400 milliGRAM(s) Oral daily  meropenem  IVPB      meropenem  IVPB 1000 milliGRAM(s) IV Intermittent every 8 hours  metolazone 5 milliGRAM(s) Oral once  pantoprazole  Injectable 40 milliGRAM(s) IV Push daily  polyethylene glycol 3350 17 Gram(s) Oral daily  potassium chloride   Powder 40 milliEquivalent(s) Oral once  senna 2 Tablet(s) Oral at bedtime  sodium bicarbonate  Injectable 100 milliEquivalent(s) IV Push once    PRN Inpatient Medications  acetaminophen  Suppository .. 650 milliGRAM(s) Rectal every 4 hours PRN          VITALS/PHYSICAL EXAM  --------------------------------------------------------------------------------  T(C): 37.4 (02-09-23 @ 08:00), Max: 37.4 (02-08-23 @ 16:00)  HR: 100 (02-09-23 @ 08:30) (61 - 100)  BP: 162/70 (02-09-23 @ 08:30) (150/63 - 207/80)  RR: 40 (02-09-23 @ 08:30) (26 - 68)  SpO2: 95% (02-09-23 @ 08:30) (95% - 100%)  Wt(kg): --        02-08-23 @ 07:01  -  02-09-23 @ 07:00  --------------------------------------------------------  IN: 3105 mL / OUT: 3750 mL / NET: -645 mL    02-09-23 @ 07:01  -  02-09-23 @ 09:06  --------------------------------------------------------  IN: 255 mL / OUT: 200 mL / NET: 55 mL        LABS/STUDIES  --------------------------------------------------------------------------------              8.7    18.00 >-----------<  199      [02-09-23 @ 03:49]              27.7     154  |  111  |  70  ----------------------------<  127      [02-09-23 @ 03:49]  3.2   |  34  |  1.2        Ca     10.8     [02-09-23 @ 03:49]      Mg     1.7     [02-09-23 @ 03:49]      Phos  2.7     [02-09-23 @ 03:49]    TPro  5.0  /  Alb  3.0  /  TBili  0.7  /  DBili  x   /  AST  18  /  ALT  35  /  AlkPhos  106  [02-09-23 @ 03:49]      PTT: 190.5      [02-08-23 @ 05:00]      Creatinine Trend:  SCr 1.2 [02-09 @ 03:49]  SCr 1.5 [02-08 @ 05:00]  SCr 1.6 [02-07 @ 05:00]  SCr 2.1 [02-06 @ 04:55]  SCr 2.2 [02-05 @ 20:55]    Urinalysis - [01-31-23 @ 20:15]      Color Orange / Appearance Turbid / SG 1.012 / pH 6.5      Gluc Negative / Ketone Negative  / Bili Negative / Urobili <2 mg/dL       Blood Small / Protein 300 mg/dL / Leuk Est Large / Nitrite Negative      RBC 6 / WBC >720 / Hyaline >145 / Gran  / Sq Epi  / Non Sq Epi >27 / Bacteria Many      Ferritin 271      [02-06-23 @ 10:43]  PTH -- (Ca --)      [02-04-23 @ 20:22]   470  Vitamin D (25OH) <6      [02-04-23 @ 20:22]  HbA1c 5.6      [12-20-19 @ 07:09]  TSH 0.15      [02-06-23 @ 10:43]

## 2023-02-09 NOTE — CHART NOTE - NSCHARTNOTEFT_GEN_A_CORE
Patient desated while on BIPAP to 60%. she became lethargic and had belly breathing. was very tachypneic. PAtient was intubated and put on mechanical ventilation. will do CXR, ABGs, and proper sedation. Family updated

## 2023-02-09 NOTE — PROGRESS NOTE ADULT - ASSESSMENT
PLAN    #Acute hypoxemic resp failure, extubated  #ARDS  #Sepsis POA  #Possible CAP  #COVID +  - MRSA (-), Strep (-), Legionella (-) on 2/1   - Covid + on 1/31/22  - CT Chest No Cont (01.31.23 @ 21:17):  Mild right pleural effusion with adjacent  atelectasis. Bibasilar atelectatic changes versus scarring. Partial obstruction of the distal right lower lobe bronchioles. Otherwise no  evidence of central endobronchial obstruction. No focal consolidation.  Right upper lobe pulmonary cyst (4-130). No pleural effusion. No  pneumothorax.  - Xray Chest 1 View-PORTABLE IMMEDIATE (Xray Chest 1 View-PORTABLE IMMEDIATE .) (02.01.23 @ 06:31):  Worsening opacifications and effusions. Support devices as described.  - No RDV given as patient was out of the window and had NOLA earlier in the hospital course   - c/w dexamethasone 6 mg q24h ( 2/1 -2/2, 2/4- current) for 10 days   - No TOCI given as patient had positive blood cultures   - D-Dimer 818, Ferritin 347, LDH (203 --> 139), CRP (107.1 --> 42), procal ( 14.3 -->37.2 --> 49.6)   - daily CXR  - daily ABG   - extubated 2/7, tolerated BIPAP  - 4L NC, will do BIPAP every 4 hours       #Septic shock  #ESBL UTI  #G+C bacteremia/ COAG - STAP  - UA 1/31 showed LE+, WBC+, Bacteria+, Epithelial cells 27+  - UCx: proteus mirabilis ESBL   - BCx: staph epidermidis mehicillin resistant ( most likely contaminanant as per ID)   - s/p Levaquin ( 1/31 - 2/3), Rocephin ( 2/1-2/3),   - c/w on Meropenem 500 mg q24h ( 2/3 - current)   - ID following     #NOLA  likely ATN vs prerenal improving  #H/O congenital unilateral kidney  #Hypercalcemia   - Renal US 2/4/23: No right hydronephrosis; left kidney congenitally absent   - Creat trending down ( 5.1 --> 2.1 --> 1.6)   - strict i/o  - Corrected Ca around 11/ PTH mediated; needs hypercalcemia workup once extubated   - no need for RRT at this time   - Renally dose medications   - nephrology following   - Lasix 40 IV once 2/7  - Metolazone 5mg given 2/9     #H/O A Fib  - rate controlled   - Therapeutic Lovenox     #Low TSH; 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

## 2023-02-09 NOTE — OCCUPATIONAL THERAPY INITIAL EVALUATION ADULT - LEVEL OF INDEPENDENCE: BED TO CHAIR, REHAB EVAL
utilized bed in chair mode in prep for OOB to chair- pt able to assist minimally with anterior and lateral weight shifting

## 2023-02-09 NOTE — PROGRESS NOTE ADULT - SUBJECTIVE AND OBJECTIVE BOX
Over Night Events: events noted, failed speech and swallow, on d5 150 /h, NGT    PHYSICAL EXAM    ICU Vital Signs Last 24 Hrs  T(C): 36.8 (09 Feb 2023 04:00), Max: 37.4 (08 Feb 2023 16:00)  T(F): 98.3 (09 Feb 2023 04:00), Max: 99.4 (08 Feb 2023 16:00)  HR: 79 (09 Feb 2023 07:15) (61 - 100)  BP: 167/72 (09 Feb 2023 07:15) (150/63 - 207/80)  BP(mean): 104 (09 Feb 2023 07:15) (87 - 131)  RR: 30 (09 Feb 2023 07:15) (26 - 68)  SpO2: 98% (09 Feb 2023 07:15) (95% - 100%)    O2 Parameters below as of 09 Feb 2023 07:00  Patient On (Oxygen Delivery Method): nasal cannula  O2 Flow (L/min): 4          General: Ill looking  Lungs: Bilateral crackles  Cardiovascular: EARLENE 2.6  Abdomen: Soft, Positive BS  Extremities: No clubbing   Very weak      02-08-23 @ 07:01  -  02-09-23 @ 07:00  --------------------------------------------------------  IN:    dextrose 5%: 300 mL    Enteral Tube Flush: 200 mL    Heparin Infusion: 60 mL    IV PiggyBack: 250 mL    IV PiggyBack: 50 mL    Peptamen A.F.: 420 mL    sodium chloride 0.45%: 1800 mL  Total IN: 3080 mL    OUT:    Indwelling Catheter - Urethral (mL): 950 mL    Rectal Tube (mL): 600 mL    Voided (mL): 2200 mL  Total OUT: 3750 mL    Total NET: -670 mL          LABS:                          8.7    18.00 )-----------( 199      ( 09 Feb 2023 03:49 )             27.7                                               02-09    154<H>  |  111<H>  |  70<HH>  ----------------------------<  127<H>  3.2<L>   |  34<H>  |  1.2    Ca    10.8<H>      09 Feb 2023 03:49  Phos  2.7     02-09  Mg     1.7     02-09    TPro  5.0<L>  /  Alb  3.0<L>  /  TBili  0.7  /  DBili  x   /  AST  18  /  ALT  35  /  AlkPhos  106  02-09      PTT - ( 08 Feb 2023 05:00 )  PTT:190.5 sec                                                                                     LIVER FUNCTIONS - ( 09 Feb 2023 03:49 )  Alb: 3.0 g/dL / Pro: 5.0 g/dL / ALK PHOS: 106 U/L / ALT: 35 U/L / AST: 18 U/L / GGT: x                                                                                                                                   ABG - ( 07 Feb 2023 13:27 )  pH, Arterial: 7.55  pH, Blood: x     /  pCO2: 41    /  pO2: 89    / HCO3: 36    / Base Excess: 12.3  /  SaO2: 96.6                MEDICATIONS  (STANDING):  amLODIPine   Tablet 10 milliGRAM(s) Oral daily  chlorhexidine 2% Cloths 1 Application(s) Topical daily  dexAMETHasone  Injectable 6 milliGRAM(s) IV Push daily  dextrose 5%. 1000 milliLiter(s) (150 mL/Hr) IV Continuous <Continuous>  enoxaparin Injectable 110 milliGRAM(s) SubCutaneous every 12 hours  hydrochlorothiazide 12.5 milliGRAM(s) Oral daily  insulin lispro (ADMELOG) corrective regimen sliding scale   SubCutaneous every 6 hours  losartan 50 milliGRAM(s) Oral daily  magnesium oxide 400 milliGRAM(s) Oral daily  meropenem  IVPB      meropenem  IVPB 1000 milliGRAM(s) IV Intermittent every 8 hours  pantoprazole  Injectable 40 milliGRAM(s) IV Push daily  polyethylene glycol 3350 17 Gram(s) Oral daily  senna 2 Tablet(s) Oral at bedtime  sodium bicarbonate  Injectable 100 milliEquivalent(s) IV Push once    MEDICATIONS  (PRN):  acetaminophen  Suppository .. 650 milliGRAM(s) Rectal every 4 hours PRN Temp greater or equal to 38.5C (101.3F)      CXR reviewed

## 2023-02-09 NOTE — OCCUPATIONAL THERAPY INITIAL EVALUATION ADULT - PERTINENT HX OF CURRENT PROBLEM, REHAB EVAL
Acute hypoxemic resp failure sp extubation on NC  ARDS improving  Sepsis POA  Septic shock improving  ESBL UTI  G+C bacteremia/ COAG - STAP  COVID + pneumonia  NOLA  likely ATN vs prerenal improving  Possible CAP

## 2023-02-09 NOTE — OCCUPATIONAL THERAPY INITIAL EVALUATION ADULT - ADDITIONAL COMMENTS
Pt difficult to hear when providing PLOF, confirmed with pt's son.  Pt's son states pt requires moderate assistance with bed mobility, minimal assistance with UB dressing, moderate with LB dressing, CGA with functional mobility with use of hand on furniture to support, Reports pt typically utilizes a w/c but is able to transfer with CGA to toilet and edge of bed.

## 2023-02-09 NOTE — CONSULT NOTE ADULT - SUBJECTIVE AND OBJECTIVE BOX
ENT: Pt is a 71y/o F a/w ARDS/respiratory failure 2/2 COVID now extubated. ENT c/s due to "damaged vocal cords" noted post extubation. 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 Patient denies any hoarseness prior, shortness of breath or difficulty breathing at this time.    PAST MEDICAL & SURGICAL HISTORY:  HTN (hypertension)  Diabetes mellitus  Obesity  Gastroesophageal reflux disease  Active asthma  Generalized OA  Afib  H/O hernia repair  2011 and revised in 2013  History of cholecystectomy    Allergies  No Known Allergies    MEDICATIONS  (STANDING):  amLODIPine   Tablet 10 milliGRAM(s) Oral daily  chlorhexidine 2% Cloths 1 Application(s) Topical daily  dexAMETHasone  Injectable 6 milliGRAM(s) IV Push daily  dextrose 5%. 1000 milliLiter(s) (150 mL/Hr) IV Continuous <Continuous>  enoxaparin Injectable 110 milliGRAM(s) SubCutaneous every 12 hours  insulin lispro (ADMELOG) corrective regimen sliding scale   SubCutaneous every 6 hours  labetalol 100 milliGRAM(s) Oral every 8 hours  losartan 50 milliGRAM(s) Oral daily  magnesium oxide 400 milliGRAM(s) Oral daily  meropenem  IVPB      meropenem  IVPB 1000 milliGRAM(s) IV Intermittent every 8 hours  polyethylene glycol 3350 17 Gram(s) Oral daily  senna 2 Tablet(s) Oral at bedtime  sodium bicarbonate  Injectable 100 milliEquivalent(s) IV Push once    MEDICATIONS  (PRN):  acetaminophen  Suppository .. 650 milliGRAM(s) Rectal every 4 hours PRN Temp greater or equal to 38.5C (101.3F)    REVIEW OF SYSTEMS   [x] A ten-point review of systems was otherwise negative except as noted.    Vital Signs Last 24 Hrs  T(C): 37.6 (09 Feb 2023 16:00), Max: 37.6 (09 Feb 2023 16:00)  T(F): 99.6 (09 Feb 2023 16:00), Max: 99.6 (09 Feb 2023 16:00)  HR: 69 (09 Feb 2023 16:00) (61 - 100)  BP: 162/70 (09 Feb 2023 16:00) (150/63 - 207/80)  BP(mean): 101 (09 Feb 2023 16:00) (87 - 131)  RR: 45 (09 Feb 2023 16:00) (26 - 52)  SpO2: 95% (09 Feb 2023 16:00) (95% - 100%)    GEN: NAD, awake and alert. No drooling or pooling of secretions. No stridor or stertor. +Hoarseness, soft vocal quality  SKIN: Good color, non diaphoretic.  HEENT: Oral mucosa pink and moist. Poor dentition. No erythema or edema noted to buccal mucosa, tongue, FOM.  NECK: Trachea midline, Neck supple  RESP: No dyspnea, non-labored breathing. No use of accessory muscles. On O2.  CARDIO: +S1/S2  ABDO: Soft, NT.  EXT: SEPULVEDA x 4    LABS:             8.7    18.00 )-----------( 199      ( 09 Feb 2023 03:49 )             27.7     151<H>  |  107  |  62<HH>  ----------------------------<  186<H>  3.7   |  34<H>  |  1.2    Ca    10.6<H>      09 Feb 2023 16:22  Phos  2.7     02-09  Mg     1.7     02-09  TPro  5.0<L>  /  Alb  3.0<L>  /  TBili  0.7  /  DBili  x   /  AST  18  /  ALT  35  /  AlkPhos  106  02-09  PTT - ( 08 Feb 2023 05:00 )  PTT:190.5 sec

## 2023-02-09 NOTE — PROGRESS NOTE ADULT - SUBJECTIVE AND OBJECTIVE BOX
DARCIEPATRICIA  72y, Female  Allergy: No Known Allergies      LOS  9d    CHIEF COMPLAINT: respiratory distress (09 Feb 2023 10:55)      INTERVAL EVENTS/HPI  - No acute events overnight  - T(F): , Max: 99.5 (02-09-23 @ 12:00)  - extubated, on 4LNC  - WBC Count: 18.00 (02-09-23 @ 03:49)  WBC Count: 15.66 (02-08-23 @ 17:50)     - Creatinine, Serum: 1.3 (02-09-23 @ 11:58)  Creatinine, Serum: 1.2 (02-09-23 @ 03:49)       ROS  General: Denies rigors, nightsweats  HEENT: Denies headache, rhinorrhea, sore throat, eye pain  CV: Denies CP, palpitations  PULM: Denies wheezing, hemoptysis  GI: Denies hematemesis, hematochezia, melena  : Denies discharge, hematuria  MSK: Denies arthralgias, myalgias  SKIN: Denies rash, lesions  NEURO: Denies paresthesias, weakness  PSYCH: Denies depression, anxiety    VITALS:  T(F): 99.5, Max: 99.5 (02-09-23 @ 12:00)  HR: 79  BP: 183/73  RR: 26Vital Signs Last 24 Hrs  T(C): 37.5 (09 Feb 2023 12:00), Max: 37.5 (09 Feb 2023 12:00)  T(F): 99.5 (09 Feb 2023 12:00), Max: 99.5 (09 Feb 2023 12:00)  HR: 79 (09 Feb 2023 12:00) (61 - 100)  BP: 183/73 (09 Feb 2023 12:00) (150/63 - 207/80)  BP(mean): 105 (09 Feb 2023 12:00) (87 - 131)  RR: 26 (09 Feb 2023 12:00) (26 - 68)  SpO2: 96% (09 Feb 2023 12:00) (95% - 100%)    Parameters below as of 09 Feb 2023 12:00  Patient On (Oxygen Delivery Method): nasal cannula  O2 Flow (L/min): 4      PHYSICAL EXAM:  Gen: NAD, resting in bed  HEENT: Normocephalic, atraumatic  Neck: supple, no lymphadenopathy  CV: Regular rate & regular rhythm  Lungs: decreased BS at bases, no fremitus  Abdomen: Soft, BS present  Ext: Warm, well perfused  Neuro: non focal, awake  Skin: no rash, no erythema  Lines: no phlebitis    FH: Non-contributory  Social Hx: Non-contributory    TESTS & MEASUREMENTS:                        8.7    18.00 )-----------( 199      ( 09 Feb 2023 03:49 )             27.7     02-09    152<H>  |  109  |  66<HH>  ----------------------------<  204<H>  3.6   |  34<H>  |  1.3    Ca    11.0<H>      09 Feb 2023 11:58  Phos  2.7     02-09  Mg     1.7     02-09    TPro  5.0<L>  /  Alb  3.0<L>  /  TBili  0.7  /  DBili  x   /  AST  18  /  ALT  35  /  AlkPhos  106  02-09      LIVER FUNCTIONS - ( 09 Feb 2023 03:49 )  Alb: 3.0 g/dL / Pro: 5.0 g/dL / ALK PHOS: 106 U/L / ALT: 35 U/L / AST: 18 U/L / GGT: x               Culture - Sputum (collected 02-03-23 @ 09:46)  Source: Trach Asp Tracheal Aspirate  Gram Stain (02-03-23 @ 23:04):    Few polymorphonuclear leukocytes per low power field    Rare Squamous epithelial cells per low power field    Rare Gram positive cocci in pairs per oil power field  Final Report (02-05-23 @ 15:11):    Normal Respiratory Samia present    Culture - Urine (collected 01-31-23 @ 20:15)  Source: Clean Catch Clean Catch (Midstream)  Final Report (02-03-23 @ 07:37):    >100,000 CFU/ml Proteus mirabilis ESBL  Organism: Proteus mirabilis ESBL (02-03-23 @ 07:37)  Organism: Proteus mirabilis ESBL (02-03-23 @ 07:37)      -  Amikacin: S <=16      -  Amoxicillin/Clavulanic Acid: S <=8/4      -  Ampicillin: R >16 These ampicillin results predict results for amoxicillin      -  Ampicillin/Sulbactam: S <=4/2 Enterobacter, Klebsiella aerogenes, Citrobacter, and Serratia may develop resistance during prolonged therapy (3-4 days)      -  Aztreonam: R <=4      -  Cefazolin: R >16 For uncomplicated UTI with K. pneumoniae, E. coli, or P. mirablis: NAGI <=16 is sensitive and NAGI >=32 is resistant. This also predicts results for oral agents cefaclor, cefdinir, cefpodoxime, cefprozil, cefuroxime axetil, cephalexin and locarbef for uncomplicated UTI. Note that some isolates may be susceptible to these agents while testing resistant to cefazolin.      -  Cefepime: R >16      -  Ceftriaxone: R >32 Enterobacter, Klebsiella aerogenes, Citrobacter, and Serratia may develop resistance during prolonged therapy      -  Cefuroxime: R >16      -  Ciprofloxacin: R >2      -  Ertapenem: S <=0.5      -  Gentamicin: S <=2      -  Levofloxacin: R >4      -  Meropenem: S <=1      -  Nitrofurantoin: R >64 Should not be used to treat pyelonephritis      -  Piperacillin/Tazobactam: S <=8      -  Tobramycin: S <=2      -  Trimethoprim/Sulfamethoxazole: R >2/38      Method Type: NAGI    Culture - Blood (collected 01-31-23 @ 19:03)  Source: .Blood Blood-Peripheral  Final Report (02-06-23 @ 01:00):    No Growth Final    Culture - Blood (collected 01-31-23 @ 19:03)  Source: .Blood Blood-Peripheral  Gram Stain (02-02-23 @ 08:28):    Growth in aerobic bottle: Gram Positive Cocci in Clusters  Final Report (02-02-23 @ 21:27):    Growth in aerobic bottle: Staphylococcus epidermidis    Coag Negative Staphylococcus    Single set isolate, possible contaminant. Contact    Microbiology if susceptibility testing clinically    indicated.    ***Blood Panel PCR results on this specimen are available    approximately 3 hours after the Gram stain result.***    Gram stain, PCR, and/or culture results may not always    correspond due to difference in methodologies.    ************************************************************    This PCR assay was performed by multiplex PCR. This    Assay tests for 66 bacterial and resistance gene targets.    Please refer to the [facility] Labs test directory    at [email or web address] for details.  Organism: Blood Culture PCR (02-02-23 @ 21:27)  Organism: Blood Culture PCR (02-02-23 @ 21:27)      -  Staphylococcus epidermidis, Methicillin resistant: Detec      Method Type: PCR            INFECTIOUS DISEASES TESTING  Procalcitonin, Serum: 2.82 (02-06-23 @ 10:43)  Procalcitonin, Serum: 49.60 (02-02-23 @ 12:05)  MRSA PCR Result.: Negative (02-01-23 @ 10:42)  Procalcitonin, Serum: 37.20 (02-01-23 @ 10:42)  Legionella Antigen, Urine: Negative (02-01-23 @ 10:10)  Procalcitonin, Serum: 14.30 (02-01-23 @ 05:55)      INFLAMMATORY MARKERS  C-Reactive Protein, Serum: 42.3 mg/L (02-06-23 @ 10:43)  C-Reactive Protein, Serum: 107.1 mg/L (02-01-23 @ 10:42)      RADIOLOGY & ADDITIONAL TESTS:  I have personally reviewed the last available Chest xray  CXR  Xray Chest 1 View- PORTABLE-Urgent:   ACC: [id number] EXAM:  XR CHEST PORTABLE URGENT 1V   ORDERED BY: EDGAR BRAN     PROCEDURE DATE:  02/08/2023          INTERPRETATION:  HISTORY & REASON FOR EXAM: Post nasogastric tube   placement.    COMPARISON: Chest radiograph 2/7/2023.    TECHNIQUE: 2 AP chest radiographs.      FINDINGS:  Support devices: Enteric tube courses below the diaphragm terminating in   the epigastrium.    Cardiac/mediastinum/hilum: Unchanged enlarged cardiomediastinal   silhouette.    Lung parenchyma/Pleura: Nonvisualization of the bilateral upper lung   zones and apices. Increased left basilar opacity/effusion. No   pneumothorax.    Skeleton/soft tissues: Unchanged.      IMPRESSION:  1.  Enteric tube courses below the diaphragm terminating in the   epigastrium.  2.  Left basilar opacity/effusion is unchanged.  3.  Nonvisualization of the bilateral upper lung zones and apices.    --- End of Report ---          BASIL VASQUEZ MD; Resident Radiologist  This document has been electronically signed.  JUDD BOSS MD; Attending Radiologist  This document has been electronically signed. Feb 9 2023  9:09AM (02-08-23 @ 18:18)      CT      CARDIOLOGY TESTING  12 Lead ECG:   Ventricular Rate 47 BPM    Atrial Rate 47 BPM    QRS Duration 113 ms    Q-T Interval 454 ms    QTC Calculation(Bazett) 402 ms    R Axis 97 degrees    T Axis 85 degreesu    Diagnosis Line Atrial fibrillation with slow ventricular response  Rightward axis  Low voltage QRS  Abnormal ECG    Confirmed by MAI RHOADES MD ([id number]) on 2/8/2023 7:18:11 AM (02-07-23 @ 07:01)  12 Lead ECG:   Ventricular Rate 56 BPM    Atrial Rate 56 BPM    QRS Duration 113 ms    Q-T Interval 429 ms    QTC Calculation(Bazett) 414 ms    R Axis 97 degrees    T Axis 74 degrees    Diagnosis Line Atrial fibrillation with slow ventricular response with premature ventricular  or aberrantly conducted complexes  Rightward axis  Abnormal ECG    Confirmed by Stacy Blackburn MD ([id number]) on 2/7/2023 10:31:22 AM (02-07-23 @ 06:41)      MEDICATIONS  amLODIPine   Tablet 10 Oral daily  chlorhexidine 2% Cloths 1 Topical daily  dexAMETHasone  Injectable 6 IV Push daily  dextrose 5%. 1000 IV Continuous <Continuous>  enoxaparin Injectable 110 SubCutaneous every 12 hours  insulin lispro (ADMELOG) corrective regimen sliding scale  SubCutaneous every 6 hours  labetalol 100 Oral every 8 hours  losartan 50 Oral daily  magnesium oxide 400 Oral daily  meropenem  IVPB     meropenem  IVPB 1000 IV Intermittent every 8 hours  pantoprazole  Injectable 40 IV Push daily  polyethylene glycol 3350 17 Oral daily  senna 2 Oral at bedtime  sodium bicarbonate  Injectable 100 IV Push once      WEIGHT  Weight (kg): 110 (01-31-23 @ 19:58)  Creatinine, Serum: 1.3 mg/dL (02-09-23 @ 11:58)  Creatinine, Serum: 1.2 mg/dL (02-09-23 @ 03:49)      ANTIBIOTICS:  meropenem  IVPB      meropenem  IVPB 1000 milliGRAM(s) IV Intermittent every 8 hours      All available historical records have been reviewed

## 2023-02-09 NOTE — PROGRESS NOTE ADULT - ASSESSMENT
IMPRESSION:    Acute hypoxemic resp failure sp extubation on NC  ARDS improving  Sepsis POA  Septic shock improving  ESBL UTI  G+C bacteremia/ COAG - STAP  COVID + pneumonia  NOLA  likely ATN vs prerenal improving  Possible CAP  HO congentital unilateral kidney  HO A FIb      PLAN:    CNS: Avoid CNS depressant    HEENT: Oral care, ENT eval    PULMONARY:  HOB @ 45 degrees. aspiration precaution, BIPAP at night, NC trial during day, keep SaO2 O2 TO 96%    CARDIOVASCULAR: Echo reviewed, avoid overload    GI: GI prophylaxis. NGT    RENAL:  Follow up lytes.  Correct as needed.     INFECTIOUS DISEASE: Finish course of abx Dexamethasone for COVID    HEMATOLOGICAL: full FM    ENDOCRINE:  Follow up FS.  Insulin protocol if needed.    MUSCULOSKELETAL: PT/ OT/ OOBTC    MICU.  L IJ 2/2

## 2023-02-09 NOTE — CONSULT NOTE ADULT - NS ATTEND AMEND GEN_ALL_CORE FT
Patient seen at bedside.  Post-extubation dysphonia.   patient intubated. To be re-scoped after extubation.
72F here with respiratory distress, found to have acute hypoxemic respiratory failure from ARDS and septic shock from GPC bacteremia, COVID, and ESBL UTI, requiring intubation, broad-spectrum IV abx, and pressors, with course c/b NOLA likely from ATN. Palliative care consulted for GOC in the setting of advanced illness and LACE scoring. Patient awake on vent.   ______________  Kishan Maravilla MD  Palliative Medicine  [facility]   of Geriatric and Palliative Medicine  [phone]

## 2023-02-10 PROBLEM — Z00.00 ENCOUNTER FOR PREVENTIVE HEALTH EXAMINATION: Status: ACTIVE | Noted: 2023-02-10

## 2023-02-10 LAB
ALBUMIN SERPL ELPH-MCNC: 2.8 G/DL — LOW (ref 3.5–5.2)
ALP SERPL-CCNC: 94 U/L — SIGNIFICANT CHANGE UP (ref 30–115)
ALT FLD-CCNC: 32 U/L — SIGNIFICANT CHANGE UP (ref 0–41)
ANION GAP SERPL CALC-SCNC: 5 MMOL/L — LOW (ref 7–14)
ANION GAP SERPL CALC-SCNC: 7 MMOL/L — SIGNIFICANT CHANGE UP (ref 7–14)
ANION GAP SERPL CALC-SCNC: 8 MMOL/L — SIGNIFICANT CHANGE UP (ref 7–14)
APTT BLD: 38 SEC — SIGNIFICANT CHANGE UP (ref 27–39.2)
AST SERPL-CCNC: 15 U/L — SIGNIFICANT CHANGE UP (ref 0–41)
BASOPHILS # BLD AUTO: 0.01 K/UL — SIGNIFICANT CHANGE UP (ref 0–0.2)
BASOPHILS NFR BLD AUTO: 0.1 % — SIGNIFICANT CHANGE UP (ref 0–1)
BILIRUB SERPL-MCNC: 0.8 MG/DL — SIGNIFICANT CHANGE UP (ref 0.2–1.2)
BLD GP AB SCN SERPL QL: SIGNIFICANT CHANGE UP
BLD GP AB SCN SERPL QL: SIGNIFICANT CHANGE UP
BUN SERPL-MCNC: 54 MG/DL — HIGH (ref 10–20)
BUN SERPL-MCNC: 59 MG/DL — HIGH (ref 10–20)
BUN SERPL-MCNC: 61 MG/DL — CRITICAL HIGH (ref 10–20)
CALCIUM SERPL-MCNC: 10.1 MG/DL — SIGNIFICANT CHANGE UP (ref 8.4–10.5)
CALCIUM SERPL-MCNC: 10.3 MG/DL — SIGNIFICANT CHANGE UP (ref 8.4–10.4)
CALCIUM SERPL-MCNC: 10.4 MG/DL — SIGNIFICANT CHANGE UP (ref 8.4–10.5)
CHLORIDE SERPL-SCNC: 105 MMOL/L — SIGNIFICANT CHANGE UP (ref 98–110)
CHLORIDE SERPL-SCNC: 105 MMOL/L — SIGNIFICANT CHANGE UP (ref 98–110)
CHLORIDE SERPL-SCNC: 107 MMOL/L — SIGNIFICANT CHANGE UP (ref 98–110)
CO2 SERPL-SCNC: 32 MMOL/L — SIGNIFICANT CHANGE UP (ref 17–32)
CO2 SERPL-SCNC: 33 MMOL/L — HIGH (ref 17–32)
CO2 SERPL-SCNC: 38 MMOL/L — HIGH (ref 17–32)
CREAT SERPL-MCNC: 1.2 MG/DL — SIGNIFICANT CHANGE UP (ref 0.7–1.5)
CREAT SERPL-MCNC: 1.3 MG/DL — SIGNIFICANT CHANGE UP (ref 0.7–1.5)
CREAT SERPL-MCNC: 1.3 MG/DL — SIGNIFICANT CHANGE UP (ref 0.7–1.5)
EGFR: 44 ML/MIN/1.73M2 — LOW
EGFR: 44 ML/MIN/1.73M2 — LOW
EGFR: 49 ML/MIN/1.73M2 — LOW
EOSINOPHIL # BLD AUTO: 0.07 K/UL — SIGNIFICANT CHANGE UP (ref 0–0.7)
EOSINOPHIL NFR BLD AUTO: 0.4 % — SIGNIFICANT CHANGE UP (ref 0–8)
GAS PNL BLDA: SIGNIFICANT CHANGE UP
GLUCOSE BLDC GLUCOMTR-MCNC: 118 MG/DL — HIGH (ref 70–99)
GLUCOSE BLDC GLUCOMTR-MCNC: 120 MG/DL — HIGH (ref 70–99)
GLUCOSE BLDC GLUCOMTR-MCNC: 125 MG/DL — HIGH (ref 70–99)
GLUCOSE BLDC GLUCOMTR-MCNC: 147 MG/DL — HIGH (ref 70–99)
GLUCOSE BLDC GLUCOMTR-MCNC: 234 MG/DL — HIGH (ref 70–99)
GLUCOSE SERPL-MCNC: 153 MG/DL — HIGH (ref 70–99)
GLUCOSE SERPL-MCNC: 163 MG/DL — HIGH (ref 70–99)
GLUCOSE SERPL-MCNC: 228 MG/DL — HIGH (ref 70–99)
HCT VFR BLD CALC: 25.2 % — LOW (ref 37–47)
HCT VFR BLD CALC: 28.1 % — LOW (ref 37–47)
HCT VFR BLD CALC: 29.2 % — LOW (ref 37–47)
HGB BLD-MCNC: 7.6 G/DL — LOW (ref 12–16)
HGB BLD-MCNC: 8.7 G/DL — LOW (ref 12–16)
HGB BLD-MCNC: 8.9 G/DL — LOW (ref 12–16)
IMM GRANULOCYTES NFR BLD AUTO: 2.3 % — HIGH (ref 0.1–0.3)
INR BLD: 1.16 RATIO — SIGNIFICANT CHANGE UP (ref 0.65–1.3)
LYMPHOCYTES # BLD AUTO: 1.41 K/UL — SIGNIFICANT CHANGE UP (ref 1.2–3.4)
LYMPHOCYTES # BLD AUTO: 9 % — LOW (ref 20.5–51.1)
MAGNESIUM SERPL-MCNC: 2.1 MG/DL — SIGNIFICANT CHANGE UP (ref 1.8–2.4)
MCHC RBC-ENTMCNC: 28.4 PG — SIGNIFICANT CHANGE UP (ref 27–31)
MCHC RBC-ENTMCNC: 28.7 PG — SIGNIFICANT CHANGE UP (ref 27–31)
MCHC RBC-ENTMCNC: 28.9 PG — SIGNIFICANT CHANGE UP (ref 27–31)
MCHC RBC-ENTMCNC: 30.2 G/DL — LOW (ref 32–37)
MCHC RBC-ENTMCNC: 30.5 G/DL — LOW (ref 32–37)
MCHC RBC-ENTMCNC: 31 G/DL — LOW (ref 32–37)
MCV RBC AUTO: 93.4 FL — SIGNIFICANT CHANGE UP (ref 81–99)
MCV RBC AUTO: 94 FL — SIGNIFICANT CHANGE UP (ref 81–99)
MCV RBC AUTO: 94.2 FL — SIGNIFICANT CHANGE UP (ref 81–99)
MONOCYTES # BLD AUTO: 1.81 K/UL — HIGH (ref 0.1–0.6)
MONOCYTES NFR BLD AUTO: 11.6 % — HIGH (ref 1.7–9.3)
NEUTROPHILS # BLD AUTO: 12.01 K/UL — HIGH (ref 1.4–6.5)
NEUTROPHILS NFR BLD AUTO: 76.6 % — HIGH (ref 42.2–75.2)
NRBC # BLD: 0 /100 WBCS — SIGNIFICANT CHANGE UP (ref 0–0)
PLATELET # BLD AUTO: 178 K/UL — SIGNIFICANT CHANGE UP (ref 130–400)
PLATELET # BLD AUTO: 199 K/UL — SIGNIFICANT CHANGE UP (ref 130–400)
PLATELET # BLD AUTO: 206 K/UL — SIGNIFICANT CHANGE UP (ref 130–400)
POTASSIUM SERPL-MCNC: 3.6 MMOL/L — SIGNIFICANT CHANGE UP (ref 3.5–5)
POTASSIUM SERPL-MCNC: 3.7 MMOL/L — SIGNIFICANT CHANGE UP (ref 3.5–5)
POTASSIUM SERPL-MCNC: 3.7 MMOL/L — SIGNIFICANT CHANGE UP (ref 3.5–5)
POTASSIUM SERPL-SCNC: 3.6 MMOL/L — SIGNIFICANT CHANGE UP (ref 3.5–5)
POTASSIUM SERPL-SCNC: 3.7 MMOL/L — SIGNIFICANT CHANGE UP (ref 3.5–5)
POTASSIUM SERPL-SCNC: 3.7 MMOL/L — SIGNIFICANT CHANGE UP (ref 3.5–5)
PROT SERPL-MCNC: 4.9 G/DL — LOW (ref 6–8)
PROTHROM AB SERPL-ACNC: 13.3 SEC — HIGH (ref 9.95–12.87)
RBC # BLD: 2.68 M/UL — LOW (ref 4.2–5.4)
RBC # BLD: 3.01 M/UL — LOW (ref 4.2–5.4)
RBC # BLD: 3.1 M/UL — LOW (ref 4.2–5.4)
RBC # FLD: 14.3 % — SIGNIFICANT CHANGE UP (ref 11.5–14.5)
RBC # FLD: 14.4 % — SIGNIFICANT CHANGE UP (ref 11.5–14.5)
RBC # FLD: 14.5 % — SIGNIFICANT CHANGE UP (ref 11.5–14.5)
SODIUM SERPL-SCNC: 146 MMOL/L — SIGNIFICANT CHANGE UP (ref 135–146)
SODIUM SERPL-SCNC: 146 MMOL/L — SIGNIFICANT CHANGE UP (ref 135–146)
SODIUM SERPL-SCNC: 148 MMOL/L — HIGH (ref 135–146)
WBC # BLD: 15.42 K/UL — HIGH (ref 4.8–10.8)
WBC # BLD: 15.67 K/UL — HIGH (ref 4.8–10.8)
WBC # BLD: 19.11 K/UL — HIGH (ref 4.8–10.8)
WBC # FLD AUTO: 15.42 K/UL — HIGH (ref 4.8–10.8)
WBC # FLD AUTO: 15.67 K/UL — HIGH (ref 4.8–10.8)
WBC # FLD AUTO: 19.11 K/UL — HIGH (ref 4.8–10.8)

## 2023-02-10 PROCEDURE — 71045 X-RAY EXAM CHEST 1 VIEW: CPT | Mod: 26

## 2023-02-10 PROCEDURE — 99254 IP/OBS CNSLTJ NEW/EST MOD 60: CPT

## 2023-02-10 PROCEDURE — 99291 CRITICAL CARE FIRST HOUR: CPT

## 2023-02-10 RX ORDER — ATROPINE SULFATE 0.1 MG/ML
0.5 SYRINGE (ML) INJECTION ONCE
Refills: 0 | Status: DISCONTINUED | OUTPATIENT
Start: 2023-02-10 | End: 2023-02-11

## 2023-02-10 RX ORDER — NOREPINEPHRINE BITARTRATE/D5W 8 MG/250ML
0.05 PLASTIC BAG, INJECTION (ML) INTRAVENOUS
Qty: 16 | Refills: 0 | Status: DISCONTINUED | OUTPATIENT
Start: 2023-02-10 | End: 2023-02-11

## 2023-02-10 RX ORDER — ENOXAPARIN SODIUM 100 MG/ML
110 INJECTION SUBCUTANEOUS EVERY 12 HOURS
Refills: 0 | Status: DISCONTINUED | OUTPATIENT
Start: 2023-02-10 | End: 2023-02-10

## 2023-02-10 RX ORDER — PANTOPRAZOLE SODIUM 20 MG/1
40 TABLET, DELAYED RELEASE ORAL EVERY 12 HOURS
Refills: 0 | Status: DISCONTINUED | OUTPATIENT
Start: 2023-02-10 | End: 2023-02-22

## 2023-02-10 RX ADMIN — CHLORHEXIDINE GLUCONATE 15 MILLILITER(S): 213 SOLUTION TOPICAL at 17:07

## 2023-02-10 RX ADMIN — Medication 6 MILLIGRAM(S): at 05:04

## 2023-02-10 RX ADMIN — POLYETHYLENE GLYCOL 3350 17 GRAM(S): 17 POWDER, FOR SOLUTION ORAL at 12:44

## 2023-02-10 RX ADMIN — CHLORHEXIDINE GLUCONATE 1 APPLICATION(S): 213 SOLUTION TOPICAL at 23:37

## 2023-02-10 RX ADMIN — PANTOPRAZOLE SODIUM 40 MILLIGRAM(S): 20 TABLET, DELAYED RELEASE ORAL at 05:04

## 2023-02-10 RX ADMIN — FENTANYL CITRATE 5.5 MICROGRAM(S)/KG/HR: 50 INJECTION INTRAVENOUS at 05:18

## 2023-02-10 RX ADMIN — MEROPENEM 100 MILLIGRAM(S): 1 INJECTION INTRAVENOUS at 21:09

## 2023-02-10 RX ADMIN — ENOXAPARIN SODIUM 110 MILLIGRAM(S): 100 INJECTION SUBCUTANEOUS at 02:07

## 2023-02-10 RX ADMIN — CHLORHEXIDINE GLUCONATE 15 MILLILITER(S): 213 SOLUTION TOPICAL at 05:04

## 2023-02-10 RX ADMIN — MAGNESIUM OXIDE 400 MG ORAL TABLET 400 MILLIGRAM(S): 241.3 TABLET ORAL at 12:43

## 2023-02-10 RX ADMIN — Medication 4: at 00:14

## 2023-02-10 RX ADMIN — Medication 5.16 MICROGRAM(S)/KG/MIN: at 00:28

## 2023-02-10 RX ADMIN — MEROPENEM 100 MILLIGRAM(S): 1 INJECTION INTRAVENOUS at 05:04

## 2023-02-10 RX ADMIN — SENNA PLUS 2 TABLET(S): 8.6 TABLET ORAL at 21:10

## 2023-02-10 RX ADMIN — MEROPENEM 100 MILLIGRAM(S): 1 INJECTION INTRAVENOUS at 14:43

## 2023-02-10 RX ADMIN — PANTOPRAZOLE SODIUM 40 MILLIGRAM(S): 20 TABLET, DELAYED RELEASE ORAL at 17:07

## 2023-02-10 NOTE — PROGRESS NOTE ADULT - SUBJECTIVE AND OBJECTIVE BOX
Over Night Events: events noted, remain critically ill, sp reintubation, on levophed 0.02, fentanyl, d5 150 CC/h, Sp suctioning mucus plug, vaginal bleed, melena    PHYSICAL EXAM    ICU Vital Signs Last 24 Hrs  T(C): 36 (10 Feb 2023 04:00), Max: 38.2 (09 Feb 2023 18:00)  T(F): 96.8 (10 Feb 2023 04:00), Max: 100.8 (09 Feb 2023 18:00)  HR: 63 (10 Feb 2023 08:00) (50 - 104)  BP: 132/60 (10 Feb 2023 08:00) (65/35 - 208/81)  BP(mean): 87 (10 Feb 2023 08:00) (45 - 128)  RR: 18 (10 Feb 2023 08:00) (15 - 83)  SpO2: 99% (10 Feb 2023 08:00) (66% - 100%)    O2 Parameters below as of 10 Feb 2023 08:00  Patient On (Oxygen Delivery Method): ventilator    O2 Concentration (%): 40        General: ill looking  HEENT:  ETT  Lungs: Bilateral BS  Cardiovascular: Regular  Abdomen: Soft, Positive BS  Neurological: Non focal       02-09-23 @ 07:01  -  02-10-23 @ 07:00  --------------------------------------------------------  IN:    Dexmedetomidine: 27.6 mL    dextrose 5%: 3450 mL    Enteral Tube Flush: 165 mL    FentaNYL: 220 mL    IV PiggyBack: 50 mL    IV PiggyBack: 150 mL    Norepinephrine: 18.7 mL    Other (mL): 500 mL    Pantoprazole: 40 mL    Peptamen A.F.: 690 mL  Total IN: 5311.2 mL    OUT:    Indwelling Catheter - Urethral (mL): 415 mL    Rectal Tube (mL): 300 mL    Voided (mL): 2400 mL  Total OUT: 3115 mL    Total NET: 2196.2 mL          LABS:                          7.6    15.67 )-----------( 206      ( 10 Feb 2023 04:50 )             25.2                                               02-10    148<H>  |  105  |  61<HH>  ----------------------------<  163<H>  3.6   |  38<H>  |  1.3    Ca    10.3      10 Feb 2023 04:50  Phos  2.7     02-09  Mg     2.1     02-10    TPro  4.9<L>  /  Alb  2.8<L>  /  TBili  0.8  /  DBili  x   /  AST  15  /  ALT  32  /  AlkPhos  94  02-10      PT/INR - ( 10 Feb 2023 00:35 )   PT: 13.30 sec;   INR: 1.16 ratio         PTT - ( 10 Feb 2023 00:35 )  PTT:38.0 sec                                                                                     LIVER FUNCTIONS - ( 10 Feb 2023 04:50 )  Alb: 2.8 g/dL / Pro: 4.9 g/dL / ALK PHOS: 94 U/L / ALT: 32 U/L / AST: 15 U/L / GGT: x                                                                                               Mode: AC/ CMV (Assist Control/ Continuous Mandatory Ventilation)  RR (machine): 18  TV (machine): 340  FiO2: 40  PEEP: 8  ITime: 1  MAP: 11  PIP: 21                                      ABG - ( 10 Feb 2023 02:25 )  pH, Arterial: 7.42  pH, Blood: x     /  pCO2: 54    /  pO2: 219   / HCO3: 35    / Base Excess: 8.7   /  SaO2: 97.8                MEDICATIONS  (STANDING):  chlorhexidine 0.12% Liquid 15 milliLiter(s) Oral Mucosa every 12 hours  chlorhexidine 2% Cloths 1 Application(s) Topical daily  dexMEDEtomidine Infusion 0.2 MICROgram(s)/kG/Hr (5.5 mL/Hr) IV Continuous <Continuous>  dextrose 5%. 1000 milliLiter(s) (150 mL/Hr) IV Continuous <Continuous>  enoxaparin Injectable 110 milliGRAM(s) SubCutaneous every 12 hours  fentaNYL   Infusion. 0.5 MICROgram(s)/kG/Hr (5.5 mL/Hr) IV Continuous <Continuous>  insulin lispro (ADMELOG) corrective regimen sliding scale   SubCutaneous every 6 hours  magnesium oxide 400 milliGRAM(s) Oral daily  meropenem  IVPB      meropenem  IVPB 1000 milliGRAM(s) IV Intermittent every 8 hours  norepinephrine Infusion 0.05 MICROgram(s)/kG/Min (5.16 mL/Hr) IV Continuous <Continuous>  pantoprazole  Injectable 40 milliGRAM(s) IV Push every 12 hours  polyethylene glycol 3350 17 Gram(s) Oral daily  senna 2 Tablet(s) Oral at bedtime  sodium bicarbonate  Injectable 100 milliEquivalent(s) IV Push once    MEDICATIONS  (PRN):  acetaminophen  Suppository .. 650 milliGRAM(s) Rectal every 4 hours PRN Temp greater or equal to 38.5C (101.3F)  atropine Injectable 0.5 milliGRAM(s) IV Push once PRN bradycardia  CXR REVIEWED

## 2023-02-10 NOTE — PROVIDER CONTACT NOTE (OTHER) - SITUATION
At 21:04, patient was noted to be tachypneic, using accessory muscle breathing and desaturating to the 60s.

## 2023-02-10 NOTE — PROGRESS NOTE ADULT - ASSESSMENT
NOLA / last creat 0.9 on 1/3/23 per HIE / hx of solitary kidney   Likely ATN (hypotension, COVID19/ARDS) / CRS / heavy NSAID use  Hyponatremia resolved  HAGMA d/t renal failure / r/o lactic acidosis/DKA  Acute respiratory failure / pulmonary edema / COVID19 / ARDS   Septic shock on pressors   - UO noted   - creat trending down    - if bp remains elevated , increase labetalol to 200 q 8   - corrected calcium around 11.6 / PTH mediated, will need w/u once more stbale  / also vit D deficient   - hypertrophic right kidney on CT / 2/4 renal ultrasound reviewed, left kidney not visualized, right kidney large w/o hydronephrosis  - increase free water and continue  iv fluids  100 cc per hour    will sign off recall PRN / call using TEAMS or on [phone]

## 2023-02-10 NOTE — PROGRESS NOTE ADULT - ASSESSMENT
IMPRESSION:    Acute hypoxemic resp failure sp reintubaition/ mucus plug  ARDS improving  Sepsis POA  Septic shock improving  ESBL UTI  G+C bacteremia/ COAG - STAP  COVID + pneumonia  NOLA  likely ATN vs prerenal improving  Possible CAP  HO congentital unilateral kidney  HO A FIb  melena/ vaginal bleeding on therapeutic lovenox for a fib    PLAN:    CNS: daily SAT    HEENT: Oral care, ENT reviewed    PULMONARY:  HOB @ 45 degrees. aspiration precaution, keep SAO2 92 TO 96%, Monitor P/P//DP    CARDIOVASCULAR: Echo reviewed, avoid overload    GI: GI prophylaxis. NPO, Protonix q 12    RENAL:  Follow up lytes.  Correct as needed.     INFECTIOUS DISEASE: Finish course of abx Dexamethasone , meropenem    HEMATOLOGICAL: full FM    ENDOCRINE:  Follow up FS.  Insulin protocol if needed.    MUSCULOSKELETAL: PT/ OT/ OOBTC    MICU.  L IJ 2/2

## 2023-02-10 NOTE — CONSULT NOTE ADULT - SUBJECTIVE AND OBJECTIVE BOX
PGY1 Note  Chief Complaint: Respiratory distress    HPI: 73 yo, GXPX, postmenopausal, with multiple medical comorbidities, on therapeutic Lovenox for anticoagulation, currently admitted for respiratory distress and intubated, with episode of vaginal bleeding.          Ob/Gyn History:  G P                 LMP -                   Cycle Length -   Denies history of ovarian cysts, uterine fibroids, abnormal paps, or STIs  Last Pap Smear -   Last Mammogram -   Last Colonoscopy -      OBhx:    Denies the following: constitutional symptoms, visual symptoms, cardiovascular symptoms, respiratory symptoms, GI symptoms, musculoskeletal symptoms, skin symptoms, neurologic symptoms, hematologic symptoms, allergic symptoms, psychiatric symptoms  Except any pertinent positives listed.     Home Medications:  alendronate 70 mg oral tablet: 1 tab(s) orally once a week (01 Feb 2023 00:45)  carvedilol 12.5 mg oral tablet: 1 tab(s) orally 2 times a day (01 Feb 2023 00:48)  cyanocobalamin 1000 mcg oral tablet, extended release: 1 tab(s) orally once a day (01 Feb 2023 00:46)  labetalol 100 mg oral tablet: 1 tab(s) orally 2 times a day (06 Nov 2020 06:59)  losartan-hydrochlorothiazide 50 mg-12.5 mg oral tablet: 1 tab(s) orally once a day (01 Feb 2023 00:46)  magnesium oxide 400 mg oral tablet: 1 tab(s) orally once a day (08 Feb 2023 13:04)  NIFEdipine 90 mg oral tablet, extended release: 1 tab(s) orally once a day (01 Feb 2023 00:46)  oxycodone-acetaminophen 5 mg-325 mg oral tablet: 1 tab(s) orally every 6 hours, As Needed - 6) for severe pain  (06 Nov 2020 06:59)  potassium chloride 10 mEq oral capsule, extended release: 2 cap(s) orally once a day (06 Nov 2020 06:59)  rivaroxaban 20 mg oral tablet: 1 tab(s) orally once a day (before a meal) (06 Nov 2020 06:59)  sodium bicarbonate 650 mg oral tablet: 1 tab(s) orally once a day (01 Feb 2023 00:47)      Allergies    No Known Allergies    Intolerances        PAST MEDICAL & SURGICAL HISTORY:  HTN (hypertension)  Diabetes mellitus  Obesity  Gastroesophageal reflux disease  Active asthma  Generalized OA  Afib  H/O hernia repair  2011 and revised in 2013  History of cholecystectomy      FAMILY HISTORY:      SOCIAL HISTORY: Denies cigarette use, alcohol use, or illicit drug use    Vital Signs Last 24 Hrs  T(F): 97.7 (10 Feb 2023 08:00), Max: 100.8 (09 Feb 2023 18:00)  HR: 96 (10 Feb 2023 09:00) (50 - 104)  BP: 166/71 (10 Feb 2023 09:00) (65/35 - 208/81)  RR: 45 (10 Feb 2023 09:00) (15 - 83)      General Appearance - AAOx3, NAD  Heart - S1S2 regular rate and rhythm  Lung - CTA Bilaterally  Abdomen - Soft, nontender, nondistended, no rebound, no rigidity, no guarding, bowel sounds present    GYN/Pelvis:    Labia Majora - Normal  Labia Minora - Normal  Clitoris - Normal  Urethra - Normal  Vagina - 5cc dark red blood in the vagina emerging from the cervix. No brisk bleeding.   Cervix - Normal    Uterus:  Size - unable to assess due to body habitus   Tenderness - None  Mass - unable to assess due to body habitus   Freely mobile    Adnexa:  Masses - unable to assess due to body habitus   Tenderness - None      Meds:   calcium gluconate IVPB 1 Gram(s) IV Intermittent once  cefepime   IVPB 1000 milliGRAM(s) IV Intermittent once  dexAMETHasone  Injectable 6 milliGRAM(s) IV Push daily  etomidate Injectable 20 milliGRAM(s) IV Push once  furosemide   Injectable 40 milliGRAM(s) IV Push once  furosemide   Injectable 40 milliGRAM(s) IV Push once  heparin   Injectable 9000 Unit(s) IV Push once  heparin   Injectable 9000 Unit(s) IV Push once  hydrALAZINE Injectable 5 milliGRAM(s) IV Push once  hydrALAZINE Injectable 5 milliGRAM(s) IV Push once  hydrALAZINE Injectable 10 milliGRAM(s) IV Push once  hydrochlorothiazide 12.5 milliGRAM(s) Oral once  labetalol 100 milliGRAM(s) Oral once  labetalol 100 milliGRAM(s) Oral once  lactated ringers Bolus 1000 milliLiter(s) IV Bolus once  levoFLOXacin IVPB 750 milliGRAM(s) IV Intermittent once  magnesium sulfate  IVPB 2 Gram(s) IV Intermittent every 2 hours  magnesium sulfate  IVPB 2 Gram(s) IV Intermittent every 2 hours  magnesium sulfate  IVPB 2 Gram(s) IV Intermittent Once  magnesium sulfate  IVPB 2 Gram(s) IV Intermittent once  magnesium sulfate  IVPB 2 Gram(s) IV Intermittent once  magnesium sulfate  IVPB 2 Gram(s) IV Intermittent every 2 hours  meropenem  IVPB 1000 milliGRAM(s) IV Intermittent once  metolazone 5 milliGRAM(s) Oral once  ondansetron Injectable 4 milliGRAM(s) IV Push once  pantoprazole  Injectable 40 milliGRAM(s) IV Push once  potassium chloride   Powder 40 milliEquivalent(s) Oral once  potassium chloride   Powder 40 milliEquivalent(s) Oral once  potassium chloride   Powder 40 milliEquivalent(s) Oral once  potassium chloride   Powder 40 milliEquivalent(s) Oral once  potassium chloride  20 mEq/100 mL IVPB 20 milliEquivalent(s) IV Intermittent once  potassium chloride  20 mEq/100 mL IVPB 20 milliEquivalent(s) IV Intermittent every 2 hours  potassium chloride  20 mEq/100 mL IVPB 20 milliEquivalent(s) IV Intermittent every 2 hours  potassium chloride  20 mEq/100 mL IVPB 20 milliEquivalent(s) IV Intermittent every 1 hour  potassium chloride  20 mEq/100 mL IVPB 20 milliEquivalent(s) IV Intermittent once  potassium chloride  20 mEq/100 mL IVPB 20 milliEquivalent(s) IV Intermittent once  rocuronium Injectable 100 milliGRAM(s) IV Push once  sodium bicarbonate  Injectable 100 milliEquivalent(s) IV Push once  sodium chloride 0.9% Bolus 500 milliLiter(s) IV Bolus once  vancomycin    Solution 125 milliGRAM(s) Oral once  vancomycin  IVPB 2000 milliGRAM(s) IV Intermittent once        LABS:                        8.9    15.42 )-----------( 199      ( 10 Feb 2023 11:19 )             29.2       ABO RH Interpretation: A POS (02-10-23 @ 00:35)  Antibody Screen: NEG (02-10-23 @ 00:35)    02-10    146  |  107  |  54<H>  ----------------------------<  153<H>  3.7   |  32  |  1.2    Ca    10.1      10 Feb 2023 11:19  Phos  2.7     02-09  Mg     2.1     02-10    TPro  4.9<L>  /  Alb  2.8<L>  /  TBili  0.8  /  DBili  x   /  AST  15  /  ALT  32  /  AlkPhos  94  02-10    PT/INR - ( 10 Feb 2023 00:35 )   PT: 13.30 sec;   INR: 1.16 ratio         PTT - ( 10 Feb 2023 00:35 )  PTT:38.0 sec        RADIOLOGY & ADDITIONAL STUDIES:   GYN CONSULT NOTE:     Chief Complaint: Respiratory distress    HPI (history obtained from chart and patient's daughter): 73yo P3 with PMH of congenital unilateral kidney, Afib on anticoagulation, DM, HTN, HLD; currently admitted to the ICU for acute hypoxemic respiratory failure secondary to COVID-19 viral syndrome. ICU course complicated by sepsis, melena, renal failure.   GYN consulted by ICU for vaginal bleeding. Per the daughter, reports that this is her mother's first episode of postmenopausal vaginal bleeding that she is aware. Denies any recent changes in weight, changes in bowel habits, changes in bladder habits. Denies fevers or night sweats. Reports she is unsure when her mother last saw a gynecologist       Ob/Gyn History:  P3                 LMP -unknown        H/o uterine fibroids. Denies history of ovarian cysts, abnormal paps, or STIs  Last Pap Smear - unknown  Last Mammogram - unknown   Last Colonoscopy - unknown     Denies the following: constitutional symptoms, visual symptoms, cardiovascular symptoms, respiratory symptoms, GI symptoms, musculoskeletal symptoms, skin symptoms, neurologic symptoms, hematologic symptoms, allergic symptoms, psychiatric symptoms  Except any pertinent positives listed.     Home Medications:  alendronate 70 mg oral tablet: 1 tab(s) orally once a week (01 Feb 2023 00:45)  carvedilol 12.5 mg oral tablet: 1 tab(s) orally 2 times a day (01 Feb 2023 00:48)  cyanocobalamin 1000 mcg oral tablet, extended release: 1 tab(s) orally once a day (01 Feb 2023 00:46)  labetalol 100 mg oral tablet: 1 tab(s) orally 2 times a day (06 Nov 2020 06:59)  losartan-hydrochlorothiazide 50 mg-12.5 mg oral tablet: 1 tab(s) orally once a day (01 Feb 2023 00:46)  magnesium oxide 400 mg oral tablet: 1 tab(s) orally once a day (08 Feb 2023 13:04)  NIFEdipine 90 mg oral tablet, extended release: 1 tab(s) orally once a day (01 Feb 2023 00:46)  oxycodone-acetaminophen 5 mg-325 mg oral tablet: 1 tab(s) orally every 6 hours, As Needed - 6) for severe pain  (06 Nov 2020 06:59)  potassium chloride 10 mEq oral capsule, extended release: 2 cap(s) orally once a day (06 Nov 2020 06:59)  rivaroxaban 20 mg oral tablet: 1 tab(s) orally once a day (before a meal) (06 Nov 2020 06:59)  sodium bicarbonate 650 mg oral tablet: 1 tab(s) orally once a day (01 Feb 2023 00:47)      Allergies: No Known Allergies    Intolerances: None       PAST MEDICAL & SURGICAL HISTORY:  HTN (hypertension)  Diabetes mellitus  Obesity  Gastroesophageal reflux disease  Active asthma  Generalized OA  Afib  H/O hernia repair  2011 and revised in 2013  History of cholecystectomy      FAMILY HISTORY: Denies h/o gynecological cancers      SOCIAL HISTORY: Denies cigarette use, alcohol use, or illicit drug use    Vital Signs Last 24 Hrs  T(F): 97.7 (10 Feb 2023 08:00), Max: 100.8 (09 Feb 2023 18:00)  HR: 96 (10 Feb 2023 09:00) (50 - 104)  BP: 166/71 (10 Feb 2023 09:00) (65/35 - 208/81)  RR: 45 (10 Feb 2023 09:00) (15 - 83)      Physical Exam: (consent for physical exam granted by healthcare proxy, daughter, at bedside)   General Appearance - Intubated. Alert and able to follow commands. Obese   Abdomen - Soft, nontender, nondistended, no rebound, no rigidity, no guarding, bowel sounds present    GYN/Pelvis:  Labia Majora - Normal  Labia Minora - Normal  Clitoris - Normal  Urethra - Normal  Vagina - 5cc dark red blood in the vagina emerging from the cervix. No brisk bleeding.   Cervix - Normal    Uterus:  Size - unable to assess due to body habitus   Tenderness - None  Mass - unable to assess due to body habitus   Freely mobile    Adnexa:  Masses - unable to assess due to body habitus   Tenderness - None      Meds:   calcium gluconate IVPB 1 Gram(s) IV Intermittent once  cefepime   IVPB 1000 milliGRAM(s) IV Intermittent once  dexAMETHasone  Injectable 6 milliGRAM(s) IV Push daily  etomidate Injectable 20 milliGRAM(s) IV Push once  furosemide   Injectable 40 milliGRAM(s) IV Push once  furosemide   Injectable 40 milliGRAM(s) IV Push once  heparin   Injectable 9000 Unit(s) IV Push once  heparin   Injectable 9000 Unit(s) IV Push once  hydrALAZINE Injectable 5 milliGRAM(s) IV Push once  hydrALAZINE Injectable 5 milliGRAM(s) IV Push once  hydrALAZINE Injectable 10 milliGRAM(s) IV Push once  hydrochlorothiazide 12.5 milliGRAM(s) Oral once  labetalol 100 milliGRAM(s) Oral once  labetalol 100 milliGRAM(s) Oral once  lactated ringers Bolus 1000 milliLiter(s) IV Bolus once  levoFLOXacin IVPB 750 milliGRAM(s) IV Intermittent once  magnesium sulfate  IVPB 2 Gram(s) IV Intermittent every 2 hours  magnesium sulfate  IVPB 2 Gram(s) IV Intermittent every 2 hours  magnesium sulfate  IVPB 2 Gram(s) IV Intermittent Once  magnesium sulfate  IVPB 2 Gram(s) IV Intermittent once  magnesium sulfate  IVPB 2 Gram(s) IV Intermittent once  magnesium sulfate  IVPB 2 Gram(s) IV Intermittent every 2 hours  meropenem  IVPB 1000 milliGRAM(s) IV Intermittent once  metolazone 5 milliGRAM(s) Oral once  ondansetron Injectable 4 milliGRAM(s) IV Push once  pantoprazole  Injectable 40 milliGRAM(s) IV Push once  potassium chloride   Powder 40 milliEquivalent(s) Oral once  potassium chloride   Powder 40 milliEquivalent(s) Oral once  potassium chloride   Powder 40 milliEquivalent(s) Oral once  potassium chloride   Powder 40 milliEquivalent(s) Oral once  potassium chloride  20 mEq/100 mL IVPB 20 milliEquivalent(s) IV Intermittent once  potassium chloride  20 mEq/100 mL IVPB 20 milliEquivalent(s) IV Intermittent every 2 hours  potassium chloride  20 mEq/100 mL IVPB 20 milliEquivalent(s) IV Intermittent every 2 hours  potassium chloride  20 mEq/100 mL IVPB 20 milliEquivalent(s) IV Intermittent every 1 hour  potassium chloride  20 mEq/100 mL IVPB 20 milliEquivalent(s) IV Intermittent once  potassium chloride  20 mEq/100 mL IVPB 20 milliEquivalent(s) IV Intermittent once  rocuronium Injectable 100 milliGRAM(s) IV Push once  sodium bicarbonate  Injectable 100 milliEquivalent(s) IV Push once  sodium chloride 0.9% Bolus 500 milliLiter(s) IV Bolus once  vancomycin    Solution 125 milliGRAM(s) Oral once  vancomycin  IVPB 2000 milliGRAM(s) IV Intermittent once        LABS:                        8.9    15.42 )-----------( 199      ( 10 Feb 2023 11:19 )             29.2       ABO RH Interpretation: A POS (02-10-23 @ 00:35)  Antibody Screen: NEG (02-10-23 @ 00:35)    02-10    146  |  107  |  54<H>  ----------------------------<  153<H>  3.7   |  32  |  1.2    Ca    10.1      10 Feb 2023 11:19  Phos  2.7     02-09  Mg     2.1     02-10    TPro  4.9<L>  /  Alb  2.8<L>  /  TBili  0.8  /  DBili  x   /  AST  15  /  ALT  32  /  AlkPhos  94  02-10    PT/INR - ( 10 Feb 2023 00:35 )   PT: 13.30 sec;   INR: 1.16 ratio         PTT - ( 10 Feb 2023 00:35 )  PTT:38.0 sec        RADIOLOGY & ADDITIONAL STUDIES:  < from: CT Abdomen and Pelvis w/ IV Cont (01.23.20 @ 01:47) >  EXAM:  CT ABDOMEN AND PELVIS IC        PROCEDURE DATE:  01/23/2020    INTERPRETATION:  CLINICAL STATEMENT: Abdominal abscess    TECHNIQUE: Contiguous axial CT images were obtained from the lower chest to the pubic symphysis following administration of 100cc Optiray 320 intravenous contrast.  Oral contrast was not administered.  Reformatted images in the coronal and sagittal planes were acquired.  COMPARISON CT: 9/26/2019  OTHER STUDIES USED FOR CORRELATION: None.   FINDINGS:    LOWER CHEST: Trace bibasilar subsegmental atelectatic change.    HEPATOBILIARY: No definite hepatic masses. Post cholecystectomy. No intra or extra hepatic biliary ductal dilatation.    SPLEEN: Unremarkable.    PANCREAS: Unremarkable.    ADRENALGLANDS: Unchanged indeterminate 2.3 x 1.4 cm left adrenal gland nodule    KIDNEYS: Redemonstrated right renal hypertrophy and congenitally absent left kidney.    ABDOMINOPELVIC NODES: No abdominopelvic lymphadenopathy.    PELVIC ORGANS: Redemonstrated calcified uterine fibroid.    PERITONEUM/MESENTERY/BOWEL: Diastases of the anterior abdominal wall, with defect containing bowel and a portion of the liver. No evidence of bowel obstruction. No free intraperitoneal air or ascites.    BONES/SOFT TISSUES: Redemonstrated inflammatory change along the right anterior abdomen and right flank. Interval coalescence of fluid in the subcutaneous right flank measures 6.3 x 4.6 x 1.9 cm (CC by AP by TRV).      IMPRESSION:     Persistent right flank inflammatory change with oblong subcutaneous abscess measuring up to 6.3 cm craniocaudal in the right flank subcutaneous fat.        DURAN SIFUENTES M.D., RESIDENT RADIOLOGIST  This document has been electronically signed.  JAY ROBLES M.D., ATTENDING RADIOLOGIST  This document has been electronically signed. Jan 23 2020  2:08AM  < end of copied text >

## 2023-02-10 NOTE — PROGRESS NOTE ADULT - ASSESSMENT
72 y.o F  F with  ARDS, respiratory failure, COVID+, VC paresis and granulomas.  Pt. seen and examined at bedside, intubated, awake.          Plan:  No acute ENT intervention   Cont. steroids 6 mg Q8H   Cont. PPI  Pt. will need to be re-scoped next week to re-evaluate VC, after extubations.   Cont. current medical management as per Primary ICU team   Cont. Abx and Antivirals as per ID  Case d/w Dr. Wood

## 2023-02-10 NOTE — CONSULT NOTE ADULT - ASSESSMENT
INCOMPLETE A/P: 73yo P3 with PMH of congenital unilateral kidney, Afib on anticoagulation, DM, HTN, HLD; currently admitted to the ICU for acute hypoxemic respiratory failure secondary to COVID-19 viral syndrome with ICU course complicated by sepsis, melena, renal failure; GYN consulted for vaginal bleeding   -no need to hold anticoagulation in the setting of vaginal bleeding. Due to patient's medical history and current clinical situation, risks of holding anticoagulation outweigh the risks associated with vaginal bleeding  -recommend transvaginal ultrasound when medically stable (due to patient's body habitus a transabdominal ultrasound would be insufficient to adequately evalute the pelvic structures)  -discussed with daughter the next steps for workup of her mother's vaginal bleeding including ultrasound and endometrial biopsy. Daughter demonstrated understanding and is amenable to mother completing an ultrasound but would like to discuss results of ultrasound prior to pursuing an endometrial biopsy. Discussed an endometrial biopsy and ultrasound can also be completed outpatient if the family and patient prefer   -recommend GI consult for melena  -management per primary team        Discussed with Dr. Severino and Dr. Edward     A/P: 73yo P3 with PMH of congenital unilateral kidney, Afib on anticoagulation, DM, HTN, HLD; currently admitted to the ICU for acute hypoxemic respiratory failure secondary to COVID-19 viral syndrome with ICU course complicated by sepsis, melena, renal failure; GYN consulted for vaginal bleeding   -no need to hold anticoagulation in the setting of vaginal bleeding. Due to patient's medical history and current clinical situation, risks of holding anticoagulation outweigh the risks associated with vaginal bleeding  -recommend transvaginal ultrasound when medically stable (due to patient's body habitus a transabdominal ultrasound would be insufficient to adequately evalute the pelvic structures)  -discussed with daughter the next steps for workup of her mother's vaginal bleeding including ultrasound and endometrial biopsy. Daughter demonstrated understanding and is amenable to mother completing an ultrasound but would like to discuss results of ultrasound prior to pursuing an endometrial biopsy. Discussed an endometrial biopsy and ultrasound can also be completed outpatient if the family and patient prefer   -recommend GI consult for melena  -management per primary team

## 2023-02-10 NOTE — PROGRESS NOTE ADULT - SUBJECTIVE AND OBJECTIVE BOX
Patient is a 72y old  Female who presents with a chief complaint of respiratory distress (10 Feb 2023 08:46)      INTERVAL HPI/OVERNIGHT EVENTS:   Reintubated overnight, saturated down to 60% on BIPAP and she became lethargic with belly breathing  Afebrile, hemodynamically stable     ICU Vital Signs Last 24 Hrs  T(C): 36.5 (10 Feb 2023 08:00), Max: 38.2 (09 Feb 2023 18:00)  T(F): 97.7 (10 Feb 2023 08:00), Max: 100.8 (09 Feb 2023 18:00)  HR: 96 (10 Feb 2023 09:00) (50 - 104)  BP: 166/71 (10 Feb 2023 09:00) (65/35 - 208/81)  BP(mean): 102 (10 Feb 2023 09:00) (45 - 128)  ABP: --  ABP(mean): --  RR: 45 (10 Feb 2023 09:00) (15 - 83)  SpO2: 100% (10 Feb 2023 09:00) (66% - 100%)    O2 Parameters below as of 10 Feb 2023 08:00  Patient On (Oxygen Delivery Method): ventilator    O2 Concentration (%): 40      I&O's Summary    09 Feb 2023 07:01  -  10 Feb 2023 07:00  --------------------------------------------------------  IN: 5311.2 mL / OUT: 3115 mL / NET: 2196.2 mL    10 Feb 2023 07:01  -  10 Feb 2023 10:26  --------------------------------------------------------  IN: 48.2 mL / OUT: 180 mL / NET: -131.8 mL      Mode: AC/ CMV (Assist Control/ Continuous Mandatory Ventilation)  RR (machine): 18  TV (machine): 340  FiO2: 40  PEEP: 8  ITime: 1  MAP: 11  PIP: 21      LABS:                        7.6    15.67 )-----------( 206      ( 10 Feb 2023 04:50 )             25.2     02-10    148<H>  |  105  |  61<HH>  ----------------------------<  163<H>  3.6   |  38<H>  |  1.3    Ca    10.3      10 Feb 2023 04:50  Phos  2.7     02-09  Mg     2.1     02-10    TPro  4.9<L>  /  Alb  2.8<L>  /  TBili  0.8  /  DBili  x   /  AST  15  /  ALT  32  /  AlkPhos  94  02-10    PT/INR - ( 10 Feb 2023 00:35 )   PT: 13.30 sec;   INR: 1.16 ratio         PTT - ( 10 Feb 2023 00:35 )  PTT:38.0 sec    CAPILLARY BLOOD GLUCOSE      POCT Blood Glucose.: 125 mg/dL (10 Feb 2023 05:02)  POCT Blood Glucose.: 234 mg/dL (10 Feb 2023 00:05)  POCT Blood Glucose.: 174 mg/dL (09 Feb 2023 18:06)  POCT Blood Glucose.: 184 mg/dL (09 Feb 2023 12:29)    ABG - ( 10 Feb 2023 02:25 )  pH, Arterial: 7.42  pH, Blood: x     /  pCO2: 54    /  pO2: 219   / HCO3: 35    / Base Excess: 8.7   /  SaO2: 97.8                RADIOLOGY & ADDITIONAL TESTS:    Consultant(s) Notes Reviewed:  [x ] YES  [ ] NO    MEDICATIONS  (STANDING):  chlorhexidine 0.12% Liquid 15 milliLiter(s) Oral Mucosa every 12 hours  chlorhexidine 2% Cloths 1 Application(s) Topical daily  dexMEDEtomidine Infusion 0.2 MICROgram(s)/kG/Hr (5.5 mL/Hr) IV Continuous <Continuous>  dextrose 5%. 1000 milliLiter(s) (150 mL/Hr) IV Continuous <Continuous>  enoxaparin Injectable 110 milliGRAM(s) SubCutaneous every 12 hours  fentaNYL   Infusion. 0.5 MICROgram(s)/kG/Hr (5.5 mL/Hr) IV Continuous <Continuous>  insulin lispro (ADMELOG) corrective regimen sliding scale   SubCutaneous every 6 hours  magnesium oxide 400 milliGRAM(s) Oral daily  meropenem  IVPB      meropenem  IVPB 1000 milliGRAM(s) IV Intermittent every 8 hours  norepinephrine Infusion 0.05 MICROgram(s)/kG/Min (5.16 mL/Hr) IV Continuous <Continuous>  pantoprazole  Injectable 40 milliGRAM(s) IV Push every 12 hours  polyethylene glycol 3350 17 Gram(s) Oral daily  senna 2 Tablet(s) Oral at bedtime  sodium bicarbonate  Injectable 100 milliEquivalent(s) IV Push once    MEDICATIONS  (PRN):  acetaminophen  Suppository .. 650 milliGRAM(s) Rectal every 4 hours PRN Temp greater or equal to 38.5C (101.3F)  atropine Injectable 0.5 milliGRAM(s) IV Push once PRN bradycardia      PHYSICAL EXAM:  GENERAL: intubated, sedated  HEAD:  Atraumatic, Normocephalic  EYES: EOMI, PERRLA, conjunctiva and sclera clear  NECK: Supple, No JVD, Normal thyroid, no enlarged nodes  NERVOUS SYSTEM:  Alert & Awake. Following commands  CHEST/LUNG: B/L good air entry; No rales, rhonchi, or wheezing  HEART: S1S2 normal, no S3, Regular rate and rhythm; No murmurs  ABDOMEN: Soft, Nontender, Nondistended; Bowel sounds present; melena present   : vaginal bleeding with clots present   EXTREMITIES:  2+ Peripheral Pulses, No clubbing, cyanosis, or edema  LYMPH: No lymphadenopathy noted  SKIN: No rashes or lesions    Care Discussed with Consultants/Other Providers [ x] YES  [ ] NO

## 2023-02-10 NOTE — PROGRESS NOTE ADULT - SUBJECTIVE AND OBJECTIVE BOX
Nephrology progress note    THIS IS AN INCOMPLETE NOTE . FULL NOTE TO FOLLOW SHORTLY    Patient is seen and examined, events over the last 24 h noted .    Allergies:  No Known Allergies    Hospital Medications:   MEDICATIONS  (STANDING):  chlorhexidine 0.12% Liquid 15 milliLiter(s) Oral Mucosa every 12 hours  chlorhexidine 2% Cloths 1 Application(s) Topical daily  dexMEDEtomidine Infusion 0.2 MICROgram(s)/kG/Hr (5.5 mL/Hr) IV Continuous <Continuous>  dextrose 5%. 1000 milliLiter(s) (150 mL/Hr) IV Continuous <Continuous>  enoxaparin Injectable 110 milliGRAM(s) SubCutaneous every 12 hours  fentaNYL   Infusion. 0.5 MICROgram(s)/kG/Hr (5.5 mL/Hr) IV Continuous <Continuous>  insulin lispro (ADMELOG) corrective regimen sliding scale   SubCutaneous every 6 hours  magnesium oxide 400 milliGRAM(s) Oral daily  meropenem  IVPB      meropenem  IVPB 1000 milliGRAM(s) IV Intermittent every 8 hours  norepinephrine Infusion 0.05 MICROgram(s)/kG/Min (5.16 mL/Hr) IV Continuous <Continuous>  pantoprazole  Injectable 40 milliGRAM(s) IV Push every 12 hours  polyethylene glycol 3350 17 Gram(s) Oral daily  senna 2 Tablet(s) Oral at bedtime  sodium bicarbonate  Injectable 100 milliEquivalent(s) IV Push once        VITALS:  T(F): 96.8 (02-10-23 @ 04:00), Max: 100.8 (02-09-23 @ 18:00)  HR: 63 (02-10-23 @ 08:00)  BP: 132/60 (02-10-23 @ 08:00)  RR: 18 (02-10-23 @ 08:00)  SpO2: 99% (02-10-23 @ 08:00)  Wt(kg): --    02-08 @ 07:01  -  02-09 @ 07:00  --------------------------------------------------------  IN: 3105 mL / OUT: 3750 mL / NET: -645 mL    02-09 @ 07:01  -  02-10 @ 07:00  --------------------------------------------------------  IN: 5311.2 mL / OUT: 3115 mL / NET: 2196.2 mL          PHYSICAL EXAM:  Constitutional: NAD  HEENT: anicteric sclera, oropharynx clear, MMM  Neck: No JVD  Respiratory: CTAB, no wheezes, rales or rhonchi  Cardiovascular: S1, S2, RRR  Gastrointestinal: BS+, soft, NT/ND  Extremities: No cyanosis or clubbing. No peripheral edema  :  No ya.   Skin: No rashes    LABS:  02-10    148<H>  |  105  |  61<HH>  ----------------------------<  163<H>  3.6   |  38<H>  |  1.3    Ca    10.3      10 Feb 2023 04:50  Phos  2.7     02-09  Mg     2.1     02-10    TPro  4.9<L>  /  Alb  2.8<L>  /  TBili  0.8  /  DBili      /  AST  15  /  ALT  32  /  AlkPhos  94  02-10                          7.6    15.67 )-----------( 206      ( 10 Feb 2023 04:50 )             25.2       Urine Studies:        Ferritin 271      [02-06-23 @ 10:43]  PTH -- (Ca --)      [02-04-23 @ 20:22]   470  Vitamin D (25OH) <6      [02-04-23 @ 20:22]  HbA1c 5.6      [12-20-19 @ 07:09]  TSH 0.15      [02-06-23 @ 10:43]    HCV 0.08, Nonreact      [09-27-19 @ 07:37]        RADIOLOGY & ADDITIONAL STUDIES:   Nephrology progress note    Patient is seen and examined, events over the last 24 h noted .  No major events     Allergies:  No Known Allergies    Hospital Medications:   MEDICATIONS  (STANDING):  dexMEDEtomidine Infusion 0.2 MICROgram(s)/kG/Hr (5.5 mL/Hr) IV Continuous <Continuous>  dextrose 5%. 1000 milliLiter(s) (150 mL/Hr) IV Continuous <Continuous>  enoxaparin Injectable 110 milliGRAM(s) SubCutaneous every 12 hours  fentaNYL   Infusion. 0.5 MICROgram(s)/kG/Hr (5.5 mL/Hr) IV Continuous <Continuous>  insulin lispro (ADMELOG) corrective regimen sliding scale   SubCutaneous every 6 hours  magnesium oxide 400 milliGRAM(s) Oral daily    meropenem  IVPB 1000 milliGRAM(s) IV Intermittent every 8 hours  norepinephrine Infusion 0.05 MICROgram(s)/kG/Min (5.16 mL/Hr) IV Continuous <Continuous>  pantoprazole  Injectable 40 milliGRAM(s) IV Push every 12 hours  polyethylene glycol 3350 17 Gram(s) Oral daily  senna 2 Tablet(s) Oral at bedtime  sodium bicarbonate  Injectable 100 milliEquivalent(s) IV Push once        VITALS:  T(F): 96.8 (02-10-23 @ 04:00), Max: 100.8 (02-09-23 @ 18:00)  HR: 63 (02-10-23 @ 08:00)  BP: 132/60 (02-10-23 @ 08:00)  RR: 18 (02-10-23 @ 08:00)  SpO2: 99% (02-10-23 @ 08:00)    02-08 @ 07:01  -  02-09 @ 07:00  --------------------------------------------------------  IN: 3105 mL / OUT: 3750 mL / NET: -645 mL    02-09 @ 07:01  -  02-10 @ 07:00  --------------------------------------------------------  IN: 5311.2 mL / OUT: 3115 mL / NET: 2196.2 mL          PHYSICAL EXAM:  Neck: No JVD  Respiratory: CTAB,  Cardiovascular: S1, S2, RRR  Gastrointestinal: BS+, soft, NT/ND  Extremities: No cyanosis or clubbing. No peripheral edema  :   ya.   Skin: No rashes    LABS:  02-10    148<H>  |  105  |  61<HH>  ----------------------------<  163<H>  3.6   |  38<H>  |  1.3    Ca    10.3      10 Feb 2023 04:50  Phos  2.7     02-09  Mg     2.1     02-10    TPro  4.9<L>  /  Alb  2.8<L>  /  TBili  0.8  /  DBili      /  AST  15  /  ALT  32  /  AlkPhos  94  02-10                          7.6    15.67 )-----------( 206      ( 10 Feb 2023 04:50 )             25.2       Urine Studies:        Ferritin 271      [02-06-23 @ 10:43]  PTH -- (Ca --)      [02-04-23 @ 20:22]   470  Vitamin D (25OH) <6      [02-04-23 @ 20:22]  HbA1c 5.6      [12-20-19 @ 07:09]  TSH 0.15      [02-06-23 @ 10:43]    HCV 0.08, Nonreact      [09-27-19 @ 07:37]        RADIOLOGY & ADDITIONAL STUDIES:

## 2023-02-11 LAB
ALBUMIN SERPL ELPH-MCNC: 2.7 G/DL — LOW (ref 3.5–5.2)
ALP SERPL-CCNC: 88 U/L — SIGNIFICANT CHANGE UP (ref 30–115)
ALT FLD-CCNC: 24 U/L — SIGNIFICANT CHANGE UP (ref 0–41)
ANION GAP SERPL CALC-SCNC: 5 MMOL/L — LOW (ref 7–14)
AST SERPL-CCNC: 12 U/L — SIGNIFICANT CHANGE UP (ref 0–41)
BASE EXCESS BLDA CALC-SCNC: 10.8 MMOL/L — HIGH (ref -2–3)
BASE EXCESS BLDA CALC-SCNC: 11.8 MMOL/L — HIGH (ref -2–3)
BASOPHILS # BLD AUTO: 0.02 K/UL — SIGNIFICANT CHANGE UP (ref 0–0.2)
BASOPHILS NFR BLD AUTO: 0.2 % — SIGNIFICANT CHANGE UP (ref 0–1)
BILIRUB SERPL-MCNC: 0.8 MG/DL — SIGNIFICANT CHANGE UP (ref 0.2–1.2)
BUN SERPL-MCNC: 48 MG/DL — HIGH (ref 10–20)
CALCIUM SERPL-MCNC: 10.5 MG/DL — SIGNIFICANT CHANGE UP (ref 8.4–10.5)
CHLORIDE SERPL-SCNC: 100 MMOL/L — SIGNIFICANT CHANGE UP (ref 98–110)
CO2 SERPL-SCNC: 35 MMOL/L — HIGH (ref 17–32)
CREAT SERPL-MCNC: 1.1 MG/DL — SIGNIFICANT CHANGE UP (ref 0.7–1.5)
EGFR: 54 ML/MIN/1.73M2 — LOW
EOSINOPHIL # BLD AUTO: 0.17 K/UL — SIGNIFICANT CHANGE UP (ref 0–0.7)
EOSINOPHIL NFR BLD AUTO: 1.6 % — SIGNIFICANT CHANGE UP (ref 0–8)
GAS PNL BLDA: SIGNIFICANT CHANGE UP
GLUCOSE BLDC GLUCOMTR-MCNC: 100 MG/DL — HIGH (ref 70–99)
GLUCOSE BLDC GLUCOMTR-MCNC: 133 MG/DL — HIGH (ref 70–99)
GLUCOSE BLDC GLUCOMTR-MCNC: 75 MG/DL — SIGNIFICANT CHANGE UP (ref 70–99)
GLUCOSE BLDC GLUCOMTR-MCNC: 93 MG/DL — SIGNIFICANT CHANGE UP (ref 70–99)
GLUCOSE SERPL-MCNC: 169 MG/DL — HIGH (ref 70–99)
HCO3 BLDA-SCNC: 35 MMOL/L — HIGH (ref 21–28)
HCO3 BLDA-SCNC: 37 MMOL/L — HIGH (ref 21–28)
HCT VFR BLD CALC: 23.7 % — LOW (ref 37–47)
HGB BLD-MCNC: 7.2 G/DL — LOW (ref 12–16)
HOROWITZ INDEX BLDA+IHG-RTO: 40 — SIGNIFICANT CHANGE UP
HOROWITZ INDEX BLDA+IHG-RTO: 40 — SIGNIFICANT CHANGE UP
IMM GRANULOCYTES NFR BLD AUTO: 1.1 % — HIGH (ref 0.1–0.3)
LYMPHOCYTES # BLD AUTO: 1.32 K/UL — SIGNIFICANT CHANGE UP (ref 1.2–3.4)
LYMPHOCYTES # BLD AUTO: 12.1 % — LOW (ref 20.5–51.1)
MAGNESIUM SERPL-MCNC: 1.6 MG/DL — LOW (ref 1.8–2.4)
MCHC RBC-ENTMCNC: 28.5 PG — SIGNIFICANT CHANGE UP (ref 27–31)
MCHC RBC-ENTMCNC: 30.4 G/DL — LOW (ref 32–37)
MCV RBC AUTO: 93.7 FL — SIGNIFICANT CHANGE UP (ref 81–99)
MONOCYTES # BLD AUTO: 0.92 K/UL — HIGH (ref 0.1–0.6)
MONOCYTES NFR BLD AUTO: 8.5 % — SIGNIFICANT CHANGE UP (ref 1.7–9.3)
NEUTROPHILS # BLD AUTO: 8.33 K/UL — HIGH (ref 1.4–6.5)
NEUTROPHILS NFR BLD AUTO: 76.5 % — HIGH (ref 42.2–75.2)
NRBC # BLD: 0 /100 WBCS — SIGNIFICANT CHANGE UP (ref 0–0)
PCO2 BLDA: 44 MMHG — SIGNIFICANT CHANGE UP (ref 25–48)
PCO2 BLDA: 50 MMHG — HIGH (ref 25–48)
PH BLDA: 7.48 — HIGH (ref 7.35–7.45)
PH BLDA: 7.51 — HIGH (ref 7.35–7.45)
PLATELET # BLD AUTO: 161 K/UL — SIGNIFICANT CHANGE UP (ref 130–400)
PO2 BLDA: 135 MMHG — HIGH (ref 83–108)
PO2 BLDA: 73 MMHG — LOW (ref 83–108)
POTASSIUM SERPL-MCNC: 3.4 MMOL/L — LOW (ref 3.5–5)
POTASSIUM SERPL-SCNC: 3.4 MMOL/L — LOW (ref 3.5–5)
PROT SERPL-MCNC: 4.4 G/DL — LOW (ref 6–8)
RBC # BLD: 2.53 M/UL — LOW (ref 4.2–5.4)
RBC # FLD: 14 % — SIGNIFICANT CHANGE UP (ref 11.5–14.5)
SAO2 % BLDA: 95 % — SIGNIFICANT CHANGE UP (ref 94–98)
SAO2 % BLDA: 97.4 % — SIGNIFICANT CHANGE UP (ref 94–98)
SODIUM SERPL-SCNC: 140 MMOL/L — SIGNIFICANT CHANGE UP (ref 135–146)
WBC # BLD: 10.88 K/UL — HIGH (ref 4.8–10.8)
WBC # FLD AUTO: 10.88 K/UL — HIGH (ref 4.8–10.8)

## 2023-02-11 PROCEDURE — 99291 CRITICAL CARE FIRST HOUR: CPT

## 2023-02-11 PROCEDURE — 71045 X-RAY EXAM CHEST 1 VIEW: CPT | Mod: 26,77

## 2023-02-11 PROCEDURE — 71045 X-RAY EXAM CHEST 1 VIEW: CPT | Mod: 26

## 2023-02-11 RX ORDER — METOCLOPRAMIDE HCL 10 MG
10 TABLET ORAL ONCE
Refills: 0 | Status: DISCONTINUED | OUTPATIENT
Start: 2023-02-11 | End: 2023-02-11

## 2023-02-11 RX ORDER — METOCLOPRAMIDE HCL 10 MG
5 TABLET ORAL EVERY 8 HOURS
Refills: 0 | Status: COMPLETED | OUTPATIENT
Start: 2023-02-11 | End: 2023-02-13

## 2023-02-11 RX ORDER — POTASSIUM CHLORIDE 20 MEQ
20 PACKET (EA) ORAL ONCE
Refills: 0 | Status: COMPLETED | OUTPATIENT
Start: 2023-02-11 | End: 2023-02-11

## 2023-02-11 RX ORDER — MAGNESIUM SULFATE 500 MG/ML
2 VIAL (ML) INJECTION
Refills: 0 | Status: COMPLETED | OUTPATIENT
Start: 2023-02-11 | End: 2023-02-11

## 2023-02-11 RX ORDER — POTASSIUM CHLORIDE 20 MEQ
10 PACKET (EA) ORAL ONCE
Refills: 0 | Status: DISCONTINUED | OUTPATIENT
Start: 2023-02-11 | End: 2023-02-11

## 2023-02-11 RX ADMIN — SENNA PLUS 2 TABLET(S): 8.6 TABLET ORAL at 21:43

## 2023-02-11 RX ADMIN — CHLORHEXIDINE GLUCONATE 1 APPLICATION(S): 213 SOLUTION TOPICAL at 23:34

## 2023-02-11 RX ADMIN — Medication 5 MILLIGRAM(S): at 21:43

## 2023-02-11 RX ADMIN — Medication 25 GRAM(S): at 05:32

## 2023-02-11 RX ADMIN — Medication 25 GRAM(S): at 08:09

## 2023-02-11 RX ADMIN — SODIUM CHLORIDE 150 MILLILITER(S): 9 INJECTION, SOLUTION INTRAVENOUS at 08:10

## 2023-02-11 RX ADMIN — POLYETHYLENE GLYCOL 3350 17 GRAM(S): 17 POWDER, FOR SOLUTION ORAL at 13:37

## 2023-02-11 RX ADMIN — SODIUM CHLORIDE 150 MILLILITER(S): 9 INJECTION, SOLUTION INTRAVENOUS at 05:10

## 2023-02-11 RX ADMIN — Medication 5 MILLIGRAM(S): at 13:37

## 2023-02-11 RX ADMIN — MEROPENEM 100 MILLIGRAM(S): 1 INJECTION INTRAVENOUS at 14:40

## 2023-02-11 RX ADMIN — FENTANYL CITRATE 5.5 MICROGRAM(S)/KG/HR: 50 INJECTION INTRAVENOUS at 02:07

## 2023-02-11 RX ADMIN — PANTOPRAZOLE SODIUM 40 MILLIGRAM(S): 20 TABLET, DELAYED RELEASE ORAL at 18:17

## 2023-02-11 RX ADMIN — FENTANYL CITRATE 5.5 MICROGRAM(S)/KG/HR: 50 INJECTION INTRAVENOUS at 08:10

## 2023-02-11 RX ADMIN — Medication 50 MILLIEQUIVALENT(S): at 04:08

## 2023-02-11 RX ADMIN — MAGNESIUM OXIDE 400 MG ORAL TABLET 400 MILLIGRAM(S): 241.3 TABLET ORAL at 13:38

## 2023-02-11 RX ADMIN — MEROPENEM 100 MILLIGRAM(S): 1 INJECTION INTRAVENOUS at 05:10

## 2023-02-11 RX ADMIN — MEROPENEM 100 MILLIGRAM(S): 1 INJECTION INTRAVENOUS at 21:44

## 2023-02-11 RX ADMIN — CHLORHEXIDINE GLUCONATE 15 MILLILITER(S): 213 SOLUTION TOPICAL at 05:10

## 2023-02-11 RX ADMIN — PANTOPRAZOLE SODIUM 40 MILLIGRAM(S): 20 TABLET, DELAYED RELEASE ORAL at 05:10

## 2023-02-11 NOTE — PROGRESS NOTE ADULT - SUBJECTIVE AND OBJECTIVE BOX
Patient is a 72y old  Female who presents with a chief complaint of respiratory distress (11 Feb 2023 09:17)      INTERVAL HPI/OVERNIGHT EVENTS:   No overnight events   Afebrile, hemodynamically stable     ICU Vital Signs Last 24 Hrs  T(C): 37.2 (11 Feb 2023 16:00), Max: 37.2 (11 Feb 2023 12:00)  T(F): 98.9 (11 Feb 2023 16:00), Max: 99 (11 Feb 2023 12:00)  HR: 64 (11 Feb 2023 16:00) (53 - 73)  BP: 138/62 (11 Feb 2023 16:00) (87/47 - 148/92)  BP(mean): 89 (11 Feb 2023 16:00) (63 - 115)  ABP: --  ABP(mean): --  RR: 36 (11 Feb 2023 16:00) (14 - 43)  SpO2: 95% (11 Feb 2023 16:00) (94% - 100%)    O2 Parameters below as of 11 Feb 2023 16:00  Patient On (Oxygen Delivery Method): mask, simple face    O2 Concentration (%): 40      I&O's Summary    10 Feb 2023 07:01  -  11 Feb 2023 07:00  --------------------------------------------------------  IN: 4488.4 mL / OUT: 2350 mL / NET: 2138.4 mL    11 Feb 2023 07:01  -  11 Feb 2023 18:18  --------------------------------------------------------  IN: 416 mL / OUT: 910 mL / NET: -494 mL      Mode: AC/ CMV (Assist Control/ Continuous Mandatory Ventilation)  RR (machine): 14  TV (machine): 340  FiO2: 40  PEEP: 8  MAP: 11  PIP: 20      LABS:                        7.2    10.88 )-----------( 161      ( 11 Feb 2023 03:58 )             23.7     02-11    140  |  100  |  48<H>  ----------------------------<  169<H>  3.4<L>   |  35<H>  |  1.1    Ca    10.5      11 Feb 2023 03:58  Mg     1.6     02-11    TPro  4.4<L>  /  Alb  2.7<L>  /  TBili  0.8  /  DBili  x   /  AST  12  /  ALT  24  /  AlkPhos  88  02-11    PT/INR - ( 10 Feb 2023 00:35 )   PT: 13.30 sec;   INR: 1.16 ratio         PTT - ( 10 Feb 2023 00:35 )  PTT:38.0 sec    CAPILLARY BLOOD GLUCOSE      POCT Blood Glucose.: 100 mg/dL (11 Feb 2023 13:41)  POCT Blood Glucose.: 133 mg/dL (11 Feb 2023 05:16)  POCT Blood Glucose.: 120 mg/dL (10 Feb 2023 23:20)    ABG - ( 11 Feb 2023 14:59 )  pH, Arterial: 7.48  pH, Blood: x     /  pCO2: 50    /  pO2: 73    / HCO3: 37    / Base Excess: 11.8  /  SaO2: 95.0                RADIOLOGY & ADDITIONAL TESTS:    Consultant(s) Notes Reviewed:  [x ] YES  [ ] NO    MEDICATIONS  (STANDING):  chlorhexidine 0.12% Liquid 15 milliLiter(s) Oral Mucosa every 12 hours  chlorhexidine 2% Cloths 1 Application(s) Topical daily  insulin lispro (ADMELOG) corrective regimen sliding scale   SubCutaneous every 6 hours  magnesium oxide 400 milliGRAM(s) Oral daily  meropenem  IVPB      meropenem  IVPB 1000 milliGRAM(s) IV Intermittent every 8 hours  metoclopramide Injectable 5 milliGRAM(s) IV Push every 8 hours  pantoprazole  Injectable 40 milliGRAM(s) IV Push every 12 hours  polyethylene glycol 3350 17 Gram(s) Oral daily  senna 2 Tablet(s) Oral at bedtime    MEDICATIONS  (PRN):  acetaminophen  Suppository .. 650 milliGRAM(s) Rectal every 4 hours PRN Temp greater or equal to 38.5C (101.3F)      PHYSICAL EXAM:  GENERAL: intubated, sedated  HEAD:  Atraumatic, Normocephalic  EYES: EOMI, PERRLA, conjunctiva and sclera clear  NECK: Supple, No JVD, Normal thyroid, no enlarged nodes  NERVOUS SYSTEM:  Alert & Awake. Following commands  CHEST/LUNG: B/L good air entry; No rales, rhonchi, or wheezing  HEART: S1S2 normal, no S3, Regular rate and rhythm; No murmurs  ABDOMEN: Soft, Nontender, Nondistended; Bowel sounds present  EXTREMITIES:  2+ Peripheral Pulses, No clubbing, cyanosis, or edema  LYMPH: No lymphadenopathy noted  SKIN: No rashes or lesions    Care Discussed with Consultants/Other Providers [ x] YES  [ ] NO

## 2023-02-11 NOTE — PROGRESS NOTE ADULT - ASSESSMENT
IMPRESSION:    Acute hypoxemic respiratory failure sp reintubation / mucus plug  ARDS improving  Sepsis POA  Septic shock improving  ESBL UTI  COVID + pneumonia  NOLA  likely ATN vs prerenal improving  Possible CAP   HO congential unilateral kidney  HO A FIb  Melena/ vaginal bleeding was on therapeutic Lovenox for a fib    PLAN:    CNS: SAT    HEENT: Oral care, ENT reviewed    PULMONARY:  HOB @ 45 degrees. Aspiration precaution.  Decrease RR to 14.  SBT..  keep SAO2 92 TO 96%.  Monitor P/P//DP    CARDIOVASCULAR: Echo reviewed, avoid overload.      GI: GI prophylaxis. Reglan.  Start OG feeding if not extubated.  Protonix q 12    RENAL:  Follow up lytes.  Correct as needed.     INFECTIOUS DISEASE: Finish ABX course per ID>      HEMATOLOGICAL:  Monitor CBC and coags.      ENDOCRINE:  Follow up FS.  Insulin protocol if needed.    MUSCULOSKELETAL: Bed rest.      MICU.  L IJ 2/2

## 2023-02-11 NOTE — PHARMACOTHERAPY INTERVENTION NOTE - INTERVENTION TYPE RECOOMEND
Therapy Recommended - Additional therapy
Dose Optimization/Non-renal Dose Adjustment
Therapy Recommended - Additional therapy
Therapy Recommended - Alternative treatment
Therapy Recommended - Med Rec related
IV to PO
Therapy Recommended - Additional therapy
Timing/Frequency of Administration Recommended
IV to PO
Therapy Recommended - Med Rec related

## 2023-02-11 NOTE — PROGRESS NOTE ADULT - SUBJECTIVE AND OBJECTIVE BOX
Patient is a 72y old  Female who presents with a chief complaint of respiratory distress (10 Feb 2023 17:14)        Over Night Events:  Remains critically ill on MV.  Sedated.  Off pressors.          ROS:     All ROS are negative except HPI         PHYSICAL EXAM    ICU Vital Signs Last 24 Hrs  T(C): 36.9 (11 Feb 2023 08:00), Max: 36.9 (11 Feb 2023 08:00)  T(F): 98.5 (11 Feb 2023 08:00), Max: 98.5 (11 Feb 2023 08:00)  HR: 57 (11 Feb 2023 09:05) (53 - 86)  BP: 94/47 (11 Feb 2023 09:05) (87/47 - 182/82)  BP(mean): 66 (11 Feb 2023 09:05) (63 - 118)  ABP: --  ABP(mean): --  RR: 23 (11 Feb 2023 09:05) (14 - 38)  SpO2: 99% (11 Feb 2023 09:05) (98% - 100%)    O2 Parameters below as of 11 Feb 2023 08:00  Patient On (Oxygen Delivery Method): ventilator    O2 Concentration (%): 40        CONSTITUTIONAL:  IN  NAD    ENT:   Airway patent,   Mouth with normal mucosa.     EYES:   Pupils equal,   Round and reactive to light.    CARDIAC:   Normal rate,   Regular rhythm.      RESPIRATORY:   No wheezing  Bilateral BS  Normal chest expansion  Not tachypneic,  No use of accessory muscles    GASTROINTESTINAL:  Abdomen soft,   Non-tender,   No guarding,   + BS    MUSCULOSKELETAL:   Range of motion is not limited,  No clubbing, cyanosis    NEUROLOGICAL:   Sedated  Follows simple commands   No motor  deficits.    SKIN:   Skin normal color for race,   Warm and dry  No evidence of rash.    PSYCHIATRIC:   No apparent risk to self or others.          02-10-23 @ 07:01  -  02-11-23 @ 07:00  --------------------------------------------------------  IN:    dextrose 5%: 3600 mL    Enteral Tube Flush: 150 mL    FentaNYL: 528 mL    IV PiggyBack: 150 mL    IV PiggyBack: 50 mL    Norepinephrine: 10.4 mL  Total IN: 4488.4 mL    OUT:    Indwelling Catheter - Urethral (mL): 2100 mL    Other (mL): 250 mL    Rectal Tube (mL): 0 mL  Total OUT: 2350 mL    Total NET: 2138.4 mL      02-11-23 @ 07:01  -  02-11-23 @ 09:17  --------------------------------------------------------  IN:    dextrose 5%: 300 mL    FentaNYL: 44 mL  Total IN: 344 mL    OUT:    Indwelling Catheter - Urethral (mL): 85 mL  Total OUT: 85 mL    Total NET: 259 mL          LABS:                            7.2    10.88 )-----------( 161      ( 11 Feb 2023 03:58 )             23.7                   7.2    10.88 )-----------( 161      ( 02-11 @ 03:58 )             23.7                8.9    15.42 )-----------( 199      ( 02-10 @ 11:19 )             29.2                7.6    15.67 )-----------( 206      ( 02-10 @ 04:50 )             25.2                8.7    19.11 )-----------( 178      ( 02-10 @ 00:35 )             28.1                8.7    18.00 )-----------( 199      ( 02-09 @ 03:49 )             27.7                8.3    15.66 )-----------( 187      ( 02-08 @ 17:50 )             26.9                                                02-11    140  |  100  |  48<H>  ----------------------------<  169<H>  3.4<L>   |  35<H>  |  1.1    Ca    10.5      11 Feb 2023 03:58  Mg     1.6     02-11    TPro  4.4<L>  /  Alb  2.7<L>  /  TBili  0.8  /  DBili  x   /  AST  12  /  ALT  24  /  AlkPhos  88  02-11      PT/INR - ( 10 Feb 2023 00:35 )   PT: 13.30 sec;   INR: 1.16 ratio         PTT - ( 10 Feb 2023 00:35 )  PTT:38.0 sec                                                                                     LIVER FUNCTIONS - ( 11 Feb 2023 03:58 )  Alb: 2.7 g/dL / Pro: 4.4 g/dL / ALK PHOS: 88 U/L / ALT: 24 U/L / AST: 12 U/L / GGT: x                                                                                               Mode: AC/ CMV (Assist Control/ Continuous Mandatory Ventilation)  RR (machine): 18  TV (machine): 340  FiO2: 40  PEEP: 8  ITime: 1  MAP: 12  PIP: 23                                      ABG - ( 11 Feb 2023 03:18 )  pH, Arterial: 7.47  pH, Blood: x     /  pCO2: 47    /  pO2: 167   / HCO3: 34    / Base Excess: 9.5   /  SaO2: 97.3                MEDICATIONS  (STANDING):  chlorhexidine 0.12% Liquid 15 milliLiter(s) Oral Mucosa every 12 hours  chlorhexidine 2% Cloths 1 Application(s) Topical daily  dexMEDEtomidine Infusion 0.2 MICROgram(s)/kG/Hr (5.5 mL/Hr) IV Continuous <Continuous>  dextrose 5%. 1000 milliLiter(s) (150 mL/Hr) IV Continuous <Continuous>  fentaNYL   Infusion. 0.5 MICROgram(s)/kG/Hr (5.5 mL/Hr) IV Continuous <Continuous>  insulin lispro (ADMELOG) corrective regimen sliding scale   SubCutaneous every 6 hours  magnesium oxide 400 milliGRAM(s) Oral daily  meropenem  IVPB      meropenem  IVPB 1000 milliGRAM(s) IV Intermittent every 8 hours  norepinephrine Infusion 0.05 MICROgram(s)/kG/Min (5.16 mL/Hr) IV Continuous <Continuous>  pantoprazole  Injectable 40 milliGRAM(s) IV Push every 12 hours  polyethylene glycol 3350 17 Gram(s) Oral daily  senna 2 Tablet(s) Oral at bedtime  sodium bicarbonate  Injectable 100 milliEquivalent(s) IV Push once    MEDICATIONS  (PRN):  acetaminophen  Suppository .. 650 milliGRAM(s) Rectal every 4 hours PRN Temp greater or equal to 38.5C (101.3F)      New X-rays reviewed:                                                                                  ECHO

## 2023-02-11 NOTE — PROGRESS NOTE ADULT - ASSESSMENT
PLAN    #Acute hypoxemic resp failure, extubated, no reintubated   #ARDS  #Sepsis POA  #Possible CAP  #COVID +  - MRSA (-), Strep (-), Legionella (-) on 2/1   - Covid + on 1/31/22  - CT Chest No Cont (01.31.23 @ 21:17):  Mild right pleural effusion with adjacent  atelectasis. Bibasilar atelectatic changes versus scarring. Partial obstruction of the distal right lower lobe bronchioles. Otherwise no  evidence of central endobronchial obstruction. No focal consolidation.  Right upper lobe pulmonary cyst (4-130). No pleural effusion. No  pneumothorax.  - Xray Chest 1 View-PORTABLE IMMEDIATE (Xray Chest 1 View-PORTABLE IMMEDIATE .) (02.01.23 @ 06:31):  Worsening opacifications and effusions. Support devices as described.  - No RDV given as patient was out of the window and had NOLA earlier in the hospital course   - c/w dexamethasone 6 mg q24h ( 2/1 -2/2, 2/4- current) for 10 days   - No TOCI given as patient had positive blood cultures   - D-Dimer 818, Ferritin 347, LDH (203 --> 139), CRP (107.1 --> 42), procal ( 14.3 -->37.2 --> 49.6)   - daily CXR  - daily ABG   - extubated 2/7, desaturated on BIPAP, now reintubated 2/9  - extubated 2/11    #Septic shock  #ESBL UTI  #G+C bacteremia/ COAG - STAP  - UA 1/31 showed LE+, WBC+, Bacteria+, Epithelial cells 27+  - UCx: proteus mirabilis ESBL   - BCx: staph epidermidis mehicillin resistant ( most likely contaminanant as per ID)   - s/p Levaquin ( 1/31 - 2/3), Rocephin ( 2/1-2/3),   - c/w on Meropenem 500 mg q24h ( 2/3 - current)   - ID following     #NOLA  likely ATN vs prerenal improving  #H/O congenital unilateral kidney  #Hypercalcemia   - Renal US 2/4/23: No right hydronephrosis; left kidney congenitally absent   - Creat trending down ( 5.1 --> 2.1 --> 1.6)   - strict i/o  - Corrected Ca around 11/ PTH mediated; needs hypercalcemia workup once extubated   - no need for RRT at this time   - Renally dose medications   - nephrology following   - Lasix 40 IV once 2/7  - Metolazone 5mg given 2/9     #H/O A Fib  - rate controlled   - Holding AC    #Low TSH; 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   - TSH: 0.15   - Total T3: 1.23  - Free T4: 1.2    #HTN  - Started labetalol 100mg q8h and metolazone 5mg   - DC hydrochlorothiazide   - continue losartan and amlodipine     #Dysphagia  - speech and swallow - did not pass FEES  - NPO for now  - ENT for damaged vocal cords - will follow    - reconsulted since second extubation 2/11    #hypernatremia  - d5 and free water flushes   - trend BMP    #Melena  - fu CBC  - Type and screen active   - coags active   - gastric lavage negative    #Vaginal bleeding  - Consult OBGYN - transvaginal US and outpatient endometrial biopsy      #DVT PPx: on hold   #GI PPx: Pantoprazole   #Activity: IAT   #Diet: NPO  #Code: Full

## 2023-02-12 LAB
ALBUMIN SERPL ELPH-MCNC: 2.7 G/DL — LOW (ref 3.5–5.2)
ALP SERPL-CCNC: 93 U/L — SIGNIFICANT CHANGE UP (ref 30–115)
ALT FLD-CCNC: 20 U/L — SIGNIFICANT CHANGE UP (ref 0–41)
ANION GAP SERPL CALC-SCNC: 6 MMOL/L — LOW (ref 7–14)
AST SERPL-CCNC: 13 U/L — SIGNIFICANT CHANGE UP (ref 0–41)
BILIRUB SERPL-MCNC: 1.1 MG/DL — SIGNIFICANT CHANGE UP (ref 0.2–1.2)
BUN SERPL-MCNC: 41 MG/DL — HIGH (ref 10–20)
CALCIUM SERPL-MCNC: 10 MG/DL — SIGNIFICANT CHANGE UP (ref 8.4–10.5)
CHLORIDE SERPL-SCNC: 102 MMOL/L — SIGNIFICANT CHANGE UP (ref 98–110)
CO2 SERPL-SCNC: 35 MMOL/L — HIGH (ref 17–32)
CREAT SERPL-MCNC: 1.2 MG/DL — SIGNIFICANT CHANGE UP (ref 0.7–1.5)
EGFR: 49 ML/MIN/1.73M2 — LOW
GLUCOSE BLDC GLUCOMTR-MCNC: 103 MG/DL — HIGH (ref 70–99)
GLUCOSE BLDC GLUCOMTR-MCNC: 114 MG/DL — HIGH (ref 70–99)
GLUCOSE BLDC GLUCOMTR-MCNC: 63 MG/DL — LOW (ref 70–99)
GLUCOSE BLDC GLUCOMTR-MCNC: 77 MG/DL — SIGNIFICANT CHANGE UP (ref 70–99)
GLUCOSE BLDC GLUCOMTR-MCNC: 90 MG/DL — SIGNIFICANT CHANGE UP (ref 70–99)
GLUCOSE BLDC GLUCOMTR-MCNC: 95 MG/DL — SIGNIFICANT CHANGE UP (ref 70–99)
GLUCOSE SERPL-MCNC: 89 MG/DL — SIGNIFICANT CHANGE UP (ref 70–99)
HCT VFR BLD CALC: 25.6 % — LOW (ref 37–47)
HGB BLD-MCNC: 8 G/DL — LOW (ref 12–16)
MCHC RBC-ENTMCNC: 28.8 PG — SIGNIFICANT CHANGE UP (ref 27–31)
MCHC RBC-ENTMCNC: 31.3 G/DL — LOW (ref 32–37)
MCV RBC AUTO: 92.1 FL — SIGNIFICANT CHANGE UP (ref 81–99)
NRBC # BLD: 0 /100 WBCS — SIGNIFICANT CHANGE UP (ref 0–0)
PLATELET # BLD AUTO: 131 K/UL — SIGNIFICANT CHANGE UP (ref 130–400)
POTASSIUM SERPL-MCNC: 3.6 MMOL/L — SIGNIFICANT CHANGE UP (ref 3.5–5)
POTASSIUM SERPL-SCNC: 3.6 MMOL/L — SIGNIFICANT CHANGE UP (ref 3.5–5)
PROT SERPL-MCNC: 4.5 G/DL — LOW (ref 6–8)
RBC # BLD: 2.78 M/UL — LOW (ref 4.2–5.4)
RBC # FLD: 13.7 % — SIGNIFICANT CHANGE UP (ref 11.5–14.5)
SODIUM SERPL-SCNC: 143 MMOL/L — SIGNIFICANT CHANGE UP (ref 135–146)
WBC # BLD: 9.73 K/UL — SIGNIFICANT CHANGE UP (ref 4.8–10.8)
WBC # FLD AUTO: 9.73 K/UL — SIGNIFICANT CHANGE UP (ref 4.8–10.8)

## 2023-02-12 PROCEDURE — 71045 X-RAY EXAM CHEST 1 VIEW: CPT | Mod: 26

## 2023-02-12 PROCEDURE — 99291 CRITICAL CARE FIRST HOUR: CPT

## 2023-02-12 RX ORDER — DEXTROSE 50 % IN WATER 50 %
25 SYRINGE (ML) INTRAVENOUS ONCE
Refills: 0 | Status: DISCONTINUED | OUTPATIENT
Start: 2023-02-12 | End: 2023-02-12

## 2023-02-12 RX ORDER — ACETAMINOPHEN 500 MG
650 TABLET ORAL EVERY 6 HOURS
Refills: 0 | Status: DISCONTINUED | OUTPATIENT
Start: 2023-02-12 | End: 2023-02-25

## 2023-02-12 RX ADMIN — MAGNESIUM OXIDE 400 MG ORAL TABLET 400 MILLIGRAM(S): 241.3 TABLET ORAL at 12:13

## 2023-02-12 RX ADMIN — PANTOPRAZOLE SODIUM 40 MILLIGRAM(S): 20 TABLET, DELAYED RELEASE ORAL at 05:28

## 2023-02-12 RX ADMIN — Medication 650 MILLIGRAM(S): at 15:30

## 2023-02-12 RX ADMIN — CHLORHEXIDINE GLUCONATE 1 APPLICATION(S): 213 SOLUTION TOPICAL at 22:30

## 2023-02-12 RX ADMIN — POLYETHYLENE GLYCOL 3350 17 GRAM(S): 17 POWDER, FOR SOLUTION ORAL at 12:12

## 2023-02-12 RX ADMIN — Medication 5 MILLIGRAM(S): at 05:28

## 2023-02-12 RX ADMIN — Medication 650 MILLIGRAM(S): at 16:00

## 2023-02-12 RX ADMIN — PANTOPRAZOLE SODIUM 40 MILLIGRAM(S): 20 TABLET, DELAYED RELEASE ORAL at 17:47

## 2023-02-12 RX ADMIN — MEROPENEM 100 MILLIGRAM(S): 1 INJECTION INTRAVENOUS at 05:28

## 2023-02-12 RX ADMIN — Medication 5 MILLIGRAM(S): at 21:47

## 2023-02-12 RX ADMIN — MEROPENEM 100 MILLIGRAM(S): 1 INJECTION INTRAVENOUS at 13:04

## 2023-02-12 RX ADMIN — SENNA PLUS 2 TABLET(S): 8.6 TABLET ORAL at 21:47

## 2023-02-12 RX ADMIN — Medication 5 MILLIGRAM(S): at 13:04

## 2023-02-12 RX ADMIN — MEROPENEM 100 MILLIGRAM(S): 1 INJECTION INTRAVENOUS at 21:49

## 2023-02-12 NOTE — PROGRESS NOTE ADULT - SUBJECTIVE AND OBJECTIVE BOX
PATRICIA SPRAGUE 72y Female  MRN#: [id number]   CODE STATUS:__Full______    Hospital Day: 12d    Patient is currently admitted with the primary diagnosis of Covid-19.    SUBJECTIVE:    Patient seen and examined at bedside this am. Patient is extubated. Remains off pressors. Patient is ready to be transferred to a SDU as per am rounds.     OBJECTIVE:  PAST MEDICAL & SURGICAL HISTORY:  HTN (hypertension)    Diabetes mellitus    Obesity    Gastroesophageal reflux disease    Active asthma    Generalized OA    Afib    H/O hernia repair  2011 and revised in 2013    History of cholecystectomy    ALLERGIES:  No Known Allergies                     HOME MEDICATIONS:  Home Medications:  alendronate 70 mg oral tablet: 1 tab(s) orally once a week (01 Feb 2023 00:45)  carvedilol 12.5 mg oral tablet: 1 tab(s) orally 2 times a day (01 Feb 2023 00:48)  cyanocobalamin 1000 mcg oral tablet, extended release: 1 tab(s) orally once a day (01 Feb 2023 00:46)  labetalol 100 mg oral tablet: 1 tab(s) orally 2 times a day (06 Nov 2020 06:59)  losartan-hydrochlorothiazide 50 mg-12.5 mg oral tablet: 1 tab(s) orally once a day (01 Feb 2023 00:46)  magnesium oxide 400 mg oral tablet: 1 tab(s) orally once a day (08 Feb 2023 13:04)  NIFEdipine 90 mg oral tablet, extended release: 1 tab(s) orally once a day (01 Feb 2023 00:46)  oxycodone-acetaminophen 5 mg-325 mg oral tablet: 1 tab(s) orally every 6 hours, As Needed - 6) for severe pain  (06 Nov 2020 06:59)  potassium chloride 10 mEq oral capsule, extended release: 2 cap(s) orally once a day (06 Nov 2020 06:59)  rivaroxaban 20 mg oral tablet: 1 tab(s) orally once a day (before a meal) (06 Nov 2020 06:59)  sodium bicarbonate 650 mg oral tablet: 1 tab(s) orally once a day (01 Feb 2023 00:47)                           MEDICATIONS:  STANDING MEDICATIONS  chlorhexidine 2% Cloths 1 Application(s) Topical daily  insulin lispro (ADMELOG) corrective regimen sliding scale   SubCutaneous every 6 hours  magnesium oxide 400 milliGRAM(s) Oral daily  meropenem  IVPB      meropenem  IVPB 1000 milliGRAM(s) IV Intermittent every 8 hours  metoclopramide Injectable 5 milliGRAM(s) IV Push every 8 hours  pantoprazole  Injectable 40 milliGRAM(s) IV Push every 12 hours  polyethylene glycol 3350 17 Gram(s) Oral daily  senna 2 Tablet(s) Oral at bedtime    PRN MEDICATIONS  acetaminophen     Tablet .. 650 milliGRAM(s) Oral every 6 hours PRN                                          VITAL SIGNS: Last 24 Hours  T(C): 37.3 (12 Feb 2023 16:00), Max: 37.9 (12 Feb 2023 15:00)  T(F): 99.2 (12 Feb 2023 16:00), Max: 100.2 (12 Feb 2023 15:00)  HR: 64 (12 Feb 2023 19:00) (64 - 74)  BP: 132/60 (12 Feb 2023 19:00) (123/58 - 161/70)  BP(mean): 86 (12 Feb 2023 19:00) (79 - 114)  RR: 37 (12 Feb 2023 19:00) (12 - 71)  SpO2: 100% (12 Feb 2023 19:00) (92% - 100%)      02-11-23 @ 07:01  -  02-12-23 @ 07:00  --------------------------------------------------------  IN: 466 mL / OUT: 2685 mL / NET: -2219 mL    02-12-23 @ 07:01  -  02-12-23 @ 19:25  --------------------------------------------------------  IN: 350 mL / OUT: 1785 mL / NET: -1435 mL        LABS:                        8.0    9.73  )-----------( 131      ( 12 Feb 2023 04:57 )             25.6     02-12    143  |  102  |  41<H>  ----------------------------<  89  3.6   |  35<H>  |  1.2    Ca    10.0      12 Feb 2023 04:57  Mg     1.6     02-11    TPro  4.5<L>  /  Alb  2.7<L>  /  TBili  1.1  /  DBili  x   /  AST  13  /  ALT  20  /  AlkPhos  93  02-12        ABG - ( 11 Feb 2023 14:59 )  pH, Arterial: 7.48  pH, Blood: x     /  pCO2: 50    /  pO2: 73    / HCO3: 37    / Base Excess: 11.8  /  SaO2: 95.0      RADIOLOGY:                < from: Xray Chest 1 View- PORTABLE-Routine (Xray Chest 1 View- PORTABLE-Routine in AM.) (02.12.23 @ 05:57) >    Impression:    Unchanged bilateral opacities.  Stable support devices.    --- End of Report ---      < end of copied text >      PHYSICAL EXAM:  General: Resting comfortably in no acute painful distress  HEENT: Normocephalic, atraumatic  LUNGS: Clear to auscultation bilaterally, no wheezes, rales, rhonchi  HEART: S1S2 present, regular rate and rhythm, no murmurs, rubs, gallops  ABDOMEN: Soft, nontender, nondistended  EXT: No edema

## 2023-02-12 NOTE — PROGRESS NOTE ADULT - ASSESSMENT
PLAN    #Acute hypoxemic resp failure, extubated, no reintubated   #ARDS  #Sepsis POA  #Possible CAP  #COVID +  - MRSA (-), Strep (-), Legionella (-) on 2/1   - Covid + on 1/31/22  - CT Chest No Cont (01.31.23 @ 21:17):  Mild right pleural effusion with adjacent  atelectasis. Bibasilar atelectatic changes versus scarring. Partial obstruction of the distal right lower lobe bronchioles. Otherwise no  evidence of central endobronchial obstruction. No focal consolidation.  Right upper lobe pulmonary cyst (4-130). No pleural effusion. No  pneumothorax.  - Xray Chest 1 View-PORTABLE IMMEDIATE (Xray Chest 1 View-PORTABLE IMMEDIATE .) (02.01.23 @ 06:31):  Worsening opacifications and effusions. Support devices as described.  - No RDV given as patient was out of the window and had NOLA earlier in the hospital course   - c/w dexamethasone 6 mg q24h ( 2/1 -2/2, 2/4- current) for 10 days   - No TOCI given as patient had positive blood cultures   - D-Dimer 818, Ferritin 347, LDH (203 --> 139), CRP (107.1 --> 42), procal ( 14.3 -->37.2 --> 49.6)   - daily CXR  - daily ABG   - extubated 2/7, desaturated on BIPAP, now reintubated 2/9  - extubated 2/11    #Septic shock  #ESBL UTI  #G+C bacteremia/ COAG - STAP  - UA 1/31 showed LE+, WBC+, Bacteria+, Epithelial cells 27+  - UCx: proteus mirabilis ESBL   - BCx: staph epidermidis mehicillin resistant ( most likely contaminanant as per ID)   - s/p Levaquin ( 1/31 - 2/3), Rocephin ( 2/1-2/3),   - c/w on Meropenem 500 mg q24h ( 2/3 - current)   - ID following     #NOLA  likely ATN vs prerenal improving  #H/O congenital unilateral kidney  #Hypercalcemia   - Renal US 2/4/23: No right hydronephrosis; left kidney congenitally absent   - Creat trending down ( 5.1 --> 2.1 --> 1.6)   - strict i/o  - Corrected Ca around 11/ PTH mediated; needs hypercalcemia workup once extubated   - no need for RRT at this time   - Renally dose medications   - nephrology following   - Lasix 40 IV once 2/7  - Metolazone 5mg given 2/9     #H/O A Fib  - rate controlled   - Holding AC    #Low TSH; 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   - TSH: 0.15   - Total T3: 1.23  - Free T4: 1.2    #HTN  - Started labetalol 100mg q8h and metolazone 5mg   - DC hydrochlorothiazide   - continue losartan and amlodipine     #Dysphagia  - speech and swallow - did not pass FEES  - NPO for now  - ENT for damaged vocal cords - will follow    - reconsulted since second extubation 2/11    #hypernatremia  - d5 and free water flushes   - trend BMP    #Melena  - fu CBC  - Type and screen active   - coags active   - gastric lavage negative    #Vaginal bleeding  - Consult OBGYN - transvaginal US and outpatient endometrial biopsy      #DVT PPx: on hold   #GI PPx: Pantoprazole   #Activity: IAT   #Diet: NPO with OGT feeds   #Code: Full

## 2023-02-12 NOTE — PROGRESS NOTE ADULT - ASSESSMENT
IMPRESSION:    Acute hypoxemic respiratory failure   ARDS improved  Sepsis POA  Septic shock improved  ESBL UTI  COVID 19 pneumonia  NOLA  likely ATN vs prerenal improving  Possible CAP   HO congential unilateral kidney  HO A FIb  Melena / vaginal bleeding was on therapeutic Lovenox for a fib    PLAN:    CNS: NO depressants     HEENT: Oral care.     PULMONARY:  HOB @ 45 degrees. Aspiration precaution.  Wean O2 keep SAO2 92 TO 96%.     CARDIOVASCULAR: Echo reviewed, avoid overload.      GI: GI prophylaxis. Reglan.  NG feeding.  Protonix q 12    RENAL:  Follow up lytes.  Correct as needed.     INFECTIOUS DISEASE: Finish ABX course per ID    HEMATOLOGICAL:  Monitor CBC and coags.      ENDOCRINE:  Follow up FS.  Insulin protocol if needed.    MUSCULOSKELETAL: Bed chair position. PT OT     DC TLC     Voiding trial     SDU

## 2023-02-12 NOTE — PROGRESS NOTE ADULT - SUBJECTIVE AND OBJECTIVE BOX
Patient is a 72y old  Female who presents with a chief complaint of respiratory distress (11 Feb 2023 18:15)        Over Night Events:  Extubated yesterday  On 30 % O2.  Off pressors.          ROS:     All ROS are negative except HPI         PHYSICAL EXAM    ICU Vital Signs Last 24 Hrs  T(C): 37.3 (12 Feb 2023 04:00), Max: 37.4 (11 Feb 2023 20:00)  T(F): 99.2 (12 Feb 2023 04:00), Max: 99.3 (11 Feb 2023 20:00)  HR: 65 (12 Feb 2023 06:00) (58 - 85)  BP: 148/63 (12 Feb 2023 06:00) (105/52 - 161/59)  BP(mean): 91 (12 Feb 2023 06:00) (75 - 98)  ABP: --  ABP(mean): --  RR: 32 (12 Feb 2023 06:00) (23 - 43)  SpO2: 97% (12 Feb 2023 06:00) (94% - 100%)    O2 Parameters below as of 12 Feb 2023 06:00  Patient On (Oxygen Delivery Method): mask, simple face    O2 Concentration (%): 40        CONSTITUTIONAL:  In NAD    ENT:   Airway patent,   Mouth with normal mucosa.       EYES:   Pupils equal,   Round and reactive to light.    CARDIAC:   Normal rate,   Regular rhythm.        RESPIRATORY:   No wheezing  Bilateral BS  Normal chest expansion  Not tachypneic,  No use of accessory muscles    GASTROINTESTINAL:  Abdomen soft,   Non-tender,   No guarding,   + BS    MUSCULOSKELETAL:   Range of motion is not limited,  No clubbing, cyanosis    NEUROLOGICAL:   Alert and oriented   No motor  deficits.    SKIN:   Skin normal color for race,   No evidence of rash.    PSYCHIATRIC:   No apparent risk to self or others.        02-11-23 @ 07:01  -  02-12-23 @ 07:00  --------------------------------------------------------  IN:    dextrose 5%: 300 mL    FentaNYL: 66 mL    IV PiggyBack: 50 mL    IV PiggyBack: 50 mL  Total IN: 466 mL    OUT:    Indwelling Catheter - Urethral (mL): 2560 mL  Total OUT: 2560 mL    Total NET: -2094 mL          LABS:                            8.0    9.73  )-----------( 131      ( 12 Feb 2023 04:57 )             25.6                                               02-12    143  |  102  |  41<H>  ----------------------------<  89  3.6   |  35<H>  |  1.2    Creatinine Trend  BUN 41, Cr 1.2, (02-12-23 @ 04:57)  Creatinine Trend  BUN 48, Cr 1.1, (02-11-23 @ 03:58)  Creatinine Trend  BUN 54, Cr 1.2, (02-10-23 @ 11:19)  Creatinine Trend  BUN 61, Cr 1.3, (02-10-23 @ 04:50)  Creatinine Trend  BUN 59, Cr 1.3, (02-10-23 @ 00:35)  Creatinine Trend  BUN 58, Cr 1.1, (02-09-23 @ 20:17)  Creatinine Trend  BUN 62, Cr 1.2, (02-09-23 @ 16:22)  Creatinine Trend  BUN 66, Cr 1.3, (02-09-23 @ 11:58)  Creatinine Trend  BUN 70, Cr 1.2, (02-09-23 @ 03:49)  Creatinine Trend  BUN 84, Cr 1.5, (02-08-23 @ 05:00)      Ca    10.0      12 Feb 2023 04:57  Mg     1.6     02-11    TPro  4.5<L>  /  Alb  2.7<L>  /  TBili  1.1  /  DBili  x   /  AST  13  /  ALT  20  /  AlkPhos  93  02-12                                                                                           LIVER FUNCTIONS - ( 12 Feb 2023 04:57 )  Alb: 2.7 g/dL / Pro: 4.5 g/dL / ALK PHOS: 93 U/L / ALT: 20 U/L / AST: 13 U/L / GGT: x                                                                                                           MEDICATIONS  (STANDING):  chlorhexidine 2% Cloths 1 Application(s) Topical daily  insulin lispro (ADMELOG) corrective regimen sliding scale   SubCutaneous every 6 hours  magnesium oxide 400 milliGRAM(s) Oral daily  meropenem  IVPB      meropenem  IVPB 1000 milliGRAM(s) IV Intermittent every 8 hours  metoclopramide Injectable 5 milliGRAM(s) IV Push every 8 hours  pantoprazole  Injectable 40 milliGRAM(s) IV Push every 12 hours  polyethylene glycol 3350 17 Gram(s) Oral daily  senna 2 Tablet(s) Oral at bedtime    MEDICATIONS  (PRN):  acetaminophen  Suppository .. 650 milliGRAM(s) Rectal every 4 hours PRN Temp greater or equal to 38.5C (101.3F)      New X-rays reviewed:                                                                                  ECHO

## 2023-02-12 NOTE — SWALLOW BEDSIDE ASSESSMENT ADULT - NS SPL SWALLOW CLINIC TRIAL FT
+overt s/s of aspiration/penetration
+overt s/s of penetration/aspiration w/ thin, mildly thick and puree

## 2023-02-13 LAB
ALBUMIN SERPL ELPH-MCNC: 2.9 G/DL — LOW (ref 3.5–5.2)
ALBUMIN SERPL ELPH-MCNC: 3.1 G/DL — LOW (ref 3.5–5.2)
ALP SERPL-CCNC: 103 U/L — SIGNIFICANT CHANGE UP (ref 30–115)
ALP SERPL-CCNC: 99 U/L — SIGNIFICANT CHANGE UP (ref 30–115)
ALT FLD-CCNC: 18 U/L — SIGNIFICANT CHANGE UP (ref 0–41)
ALT FLD-CCNC: 18 U/L — SIGNIFICANT CHANGE UP (ref 0–41)
ANION GAP SERPL CALC-SCNC: 6 MMOL/L — LOW (ref 7–14)
ANION GAP SERPL CALC-SCNC: 8 MMOL/L — SIGNIFICANT CHANGE UP (ref 7–14)
AST SERPL-CCNC: 14 U/L — SIGNIFICANT CHANGE UP (ref 0–41)
AST SERPL-CCNC: 16 U/L — SIGNIFICANT CHANGE UP (ref 0–41)
BASOPHILS # BLD AUTO: 0.02 K/UL — SIGNIFICANT CHANGE UP (ref 0–0.2)
BASOPHILS NFR BLD AUTO: 0.2 % — SIGNIFICANT CHANGE UP (ref 0–1)
BILIRUB SERPL-MCNC: 0.8 MG/DL — SIGNIFICANT CHANGE UP (ref 0.2–1.2)
BILIRUB SERPL-MCNC: 0.9 MG/DL — SIGNIFICANT CHANGE UP (ref 0.2–1.2)
BUN SERPL-MCNC: 36 MG/DL — HIGH (ref 10–20)
BUN SERPL-MCNC: 38 MG/DL — HIGH (ref 10–20)
CALCIUM SERPL-MCNC: 10 MG/DL — SIGNIFICANT CHANGE UP (ref 8.4–10.5)
CALCIUM SERPL-MCNC: 9.8 MG/DL — SIGNIFICANT CHANGE UP (ref 8.4–10.5)
CHLORIDE SERPL-SCNC: 106 MMOL/L — SIGNIFICANT CHANGE UP (ref 98–110)
CHLORIDE SERPL-SCNC: 108 MMOL/L — SIGNIFICANT CHANGE UP (ref 98–110)
CO2 SERPL-SCNC: 34 MMOL/L — HIGH (ref 17–32)
CO2 SERPL-SCNC: 35 MMOL/L — HIGH (ref 17–32)
CREAT SERPL-MCNC: 1 MG/DL — SIGNIFICANT CHANGE UP (ref 0.7–1.5)
CREAT SERPL-MCNC: 1.1 MG/DL — SIGNIFICANT CHANGE UP (ref 0.7–1.5)
EGFR: 54 ML/MIN/1.73M2 — LOW
EGFR: 61 ML/MIN/1.73M2 — SIGNIFICANT CHANGE UP
EOSINOPHIL # BLD AUTO: 0.17 K/UL — SIGNIFICANT CHANGE UP (ref 0–0.7)
EOSINOPHIL NFR BLD AUTO: 1.7 % — SIGNIFICANT CHANGE UP (ref 0–8)
GLUCOSE BLDC GLUCOMTR-MCNC: 116 MG/DL — HIGH (ref 70–99)
GLUCOSE BLDC GLUCOMTR-MCNC: 128 MG/DL — HIGH (ref 70–99)
GLUCOSE BLDC GLUCOMTR-MCNC: 130 MG/DL — HIGH (ref 70–99)
GLUCOSE BLDC GLUCOMTR-MCNC: 130 MG/DL — HIGH (ref 70–99)
GLUCOSE SERPL-MCNC: 113 MG/DL — HIGH (ref 70–99)
GLUCOSE SERPL-MCNC: 116 MG/DL — HIGH (ref 70–99)
HCT VFR BLD CALC: 26.9 % — LOW (ref 37–47)
HGB BLD-MCNC: 8.3 G/DL — LOW (ref 12–16)
IMM GRANULOCYTES NFR BLD AUTO: 0.8 % — HIGH (ref 0.1–0.3)
LYMPHOCYTES # BLD AUTO: 1.41 K/UL — SIGNIFICANT CHANGE UP (ref 1.2–3.4)
LYMPHOCYTES # BLD AUTO: 14.2 % — LOW (ref 20.5–51.1)
MAGNESIUM SERPL-MCNC: 1.8 MG/DL — SIGNIFICANT CHANGE UP (ref 1.8–2.4)
MCHC RBC-ENTMCNC: 28.3 PG — SIGNIFICANT CHANGE UP (ref 27–31)
MCHC RBC-ENTMCNC: 30.9 G/DL — LOW (ref 32–37)
MCV RBC AUTO: 91.8 FL — SIGNIFICANT CHANGE UP (ref 81–99)
MONOCYTES # BLD AUTO: 1.1 K/UL — HIGH (ref 0.1–0.6)
MONOCYTES NFR BLD AUTO: 11.1 % — HIGH (ref 1.7–9.3)
NEUTROPHILS # BLD AUTO: 7.14 K/UL — HIGH (ref 1.4–6.5)
NEUTROPHILS NFR BLD AUTO: 72 % — SIGNIFICANT CHANGE UP (ref 42.2–75.2)
NRBC # BLD: 0 /100 WBCS — SIGNIFICANT CHANGE UP (ref 0–0)
PLATELET # BLD AUTO: 191 K/UL — SIGNIFICANT CHANGE UP (ref 130–400)
POTASSIUM SERPL-MCNC: 3.3 MMOL/L — LOW (ref 3.5–5)
POTASSIUM SERPL-MCNC: 3.9 MMOL/L — SIGNIFICANT CHANGE UP (ref 3.5–5)
POTASSIUM SERPL-SCNC: 3.3 MMOL/L — LOW (ref 3.5–5)
POTASSIUM SERPL-SCNC: 3.9 MMOL/L — SIGNIFICANT CHANGE UP (ref 3.5–5)
PROT SERPL-MCNC: 4.9 G/DL — LOW (ref 6–8)
PROT SERPL-MCNC: 5.2 G/DL — LOW (ref 6–8)
RBC # BLD: 2.93 M/UL — LOW (ref 4.2–5.4)
RBC # FLD: 13.5 % — SIGNIFICANT CHANGE UP (ref 11.5–14.5)
SODIUM SERPL-SCNC: 148 MMOL/L — HIGH (ref 135–146)
SODIUM SERPL-SCNC: 149 MMOL/L — HIGH (ref 135–146)
WBC # BLD: 9.92 K/UL — SIGNIFICANT CHANGE UP (ref 4.8–10.8)
WBC # FLD AUTO: 9.92 K/UL — SIGNIFICANT CHANGE UP (ref 4.8–10.8)

## 2023-02-13 PROCEDURE — 71045 X-RAY EXAM CHEST 1 VIEW: CPT | Mod: 26

## 2023-02-13 PROCEDURE — 99233 SBSQ HOSP IP/OBS HIGH 50: CPT

## 2023-02-13 PROCEDURE — 76830 TRANSVAGINAL US NON-OB: CPT | Mod: 26

## 2023-02-13 PROCEDURE — 99223 1ST HOSP IP/OBS HIGH 75: CPT

## 2023-02-13 PROCEDURE — 93010 ELECTROCARDIOGRAM REPORT: CPT

## 2023-02-13 RX ORDER — MAGNESIUM SULFATE 500 MG/ML
2 VIAL (ML) INJECTION ONCE
Refills: 0 | Status: COMPLETED | OUTPATIENT
Start: 2023-02-13 | End: 2023-02-13

## 2023-02-13 RX ORDER — POTASSIUM CHLORIDE 20 MEQ
40 PACKET (EA) ORAL ONCE
Refills: 0 | Status: COMPLETED | OUTPATIENT
Start: 2023-02-13 | End: 2023-02-13

## 2023-02-13 RX ORDER — SENNA PLUS 8.6 MG/1
2 TABLET ORAL AT BEDTIME
Refills: 0 | Status: DISCONTINUED | OUTPATIENT
Start: 2023-02-13 | End: 2023-02-19

## 2023-02-13 RX ORDER — SODIUM CHLORIDE 9 MG/ML
1000 INJECTION, SOLUTION INTRAVENOUS
Refills: 0 | Status: DISCONTINUED | OUTPATIENT
Start: 2023-02-13 | End: 2023-02-14

## 2023-02-13 RX ORDER — POTASSIUM CHLORIDE 20 MEQ
20 PACKET (EA) ORAL ONCE
Refills: 0 | Status: COMPLETED | OUTPATIENT
Start: 2023-02-13 | End: 2023-02-13

## 2023-02-13 RX ADMIN — Medication 40 MILLIEQUIVALENT(S): at 09:53

## 2023-02-13 RX ADMIN — Medication 50 MILLIEQUIVALENT(S): at 09:53

## 2023-02-13 RX ADMIN — MEROPENEM 100 MILLIGRAM(S): 1 INJECTION INTRAVENOUS at 05:15

## 2023-02-13 RX ADMIN — Medication 25 GRAM(S): at 09:53

## 2023-02-13 RX ADMIN — PANTOPRAZOLE SODIUM 40 MILLIGRAM(S): 20 TABLET, DELAYED RELEASE ORAL at 17:40

## 2023-02-13 RX ADMIN — Medication 0: at 11:53

## 2023-02-13 RX ADMIN — CHLORHEXIDINE GLUCONATE 1 APPLICATION(S): 213 SOLUTION TOPICAL at 23:36

## 2023-02-13 RX ADMIN — Medication 5 MILLIGRAM(S): at 05:15

## 2023-02-13 RX ADMIN — SODIUM CHLORIDE 150 MILLILITER(S): 9 INJECTION, SOLUTION INTRAVENOUS at 21:51

## 2023-02-13 RX ADMIN — PANTOPRAZOLE SODIUM 40 MILLIGRAM(S): 20 TABLET, DELAYED RELEASE ORAL at 05:16

## 2023-02-13 RX ADMIN — MAGNESIUM OXIDE 400 MG ORAL TABLET 400 MILLIGRAM(S): 241.3 TABLET ORAL at 11:53

## 2023-02-13 NOTE — PROGRESS NOTE ADULT - ASSESSMENT
ASSESSMENT  Acute hypoxemic resp failure   s/p extubation on  BIPAP  ARDS improving  Sepsis POA  Septic shock improving  ESBL UTI  G+C bacteremia/ COAG - STAP  COVID + pneumonia  NOLA  likely ATN vs prerenal improving  Possible CAP  HO congentital unilateral kidney  HO A FIb  hypernatremia/hypokalemia  hypomagnesemia     PLAN  NPO now  ENT f/u pending  failed FEES   will f/u

## 2023-02-13 NOTE — PHARMACOTHERAPY INTERVENTION NOTE - COMMENTS
D5W @ 150 ml/hr-Na 140, d/w team, d/c D5W
K 3.2-Klor 40meq ng x1 @ 5am, recommended another Klor 40meq x1
Patient's COVID PCR result is positive on 1/31. There is no strong data suggesting benefit in the use of remdesivir in intubated patients. Current COVID-19 treatment guidelines from the [employer] [place] [employer] do not recommend remdesivir in intubated patients. Current NIH recommendations for the treatment of COVID-19 in mechanically ventilated patients include dexamethasone plus tocilizumab or baricitinib in patient's requiring mechanical ventilation.
SCr 1.5, CrCl ~60, recommended increasing meropenem to 1g IV q8h
feeds NG, pantoprazole IV to suspension
metoclopramide 10mg IV x1, plan-add metoclopramide, recommended changing to 5mg IV q8h
on fentanyl drip, recommended adding bowel regimen w/ senna & Miralax
pt intubated, nifedipine XL 90mg og daily, d/w team, d/c nifedipine  -home med losartan, will resume @ 50mg daily
recommended changing pantoprazole IV to suspension
heparin drip increased to 2400 units/hr ~1am, no subsequent aPTT, will obtain 
on meropenem -per ID 10 day course of ABX, completed, d/w team, d/c 
vancomycin 125mg og q12h-recommended x1 dose now
BP elevated, home med labetalol, will resume @ 100mg ng q8h, 1st dose now 
Recommended increasing ceftriaxone dose to 2g IV daily since patient is >100kg. 
Updated patient's height for this admission to 165.1 cm, as height was previously documented as 165.1 cm per [facility], and there was no height documented for this admission. 
recommended vancomycin 2g IV x1
-recommended adding GI prophylaxis- pantoprazole
SCr 1.6, CrCl ~56.8, recommended increasing meropenem to 1g IV q12h

## 2023-02-13 NOTE — PROGRESS NOTE ADULT - ASSESSMENT
IMPRESSION:    Acute hypoxemic respiratory failure   ARDS improved  Sepsis POA  Septic shock improved  ESBL UTI  Hypernatremia  COVID 19 pneumonia  NOLA  likely ATN vs prerenal improving  Possible CAP   HO congential unilateral kidney  HO A FIb  Melena / vaginal bleeding was on therapeutic Lovenox for a fib    PLAN:    CNS: No depressants     HEENT: Oral care.     PULMONARY:  HOB @ 45 degrees. Aspiration precaution. On room air.    CARDIOVASCULAR: Echo reviewed, avoid overload.      GI: GI prophylaxis. Reglan. NG feeding.  Protonix IV Q12H.    RENAL:  Follow up lytes. Correct as needed. Free water flushes 300mL Q6H.    INFECTIOUS DISEASE: Completed antibiotic course.    HEMATOLOGICAL:  Monitor CBC and coags.      ENDOCRINE:  Follow up FS.  Insulin protocol if needed.    MUSCULOSKELETAL: Bed chair position. PT OT     DC TLC   Need peripheral access    Downgrade to medical floor IMPRESSION:    Acute hypoxemic respiratory failure   ARDS improved  Sepsis POA  Septic shock improved  ESBL UTI  Hypernatremia  COVID 19 pneumonia  NOLA  likely ATN vs prerenal improving  Possible CAP   HO congential unilateral kidney  HO A FIb  Melena / vaginal bleeding was on therapeutic Lovenox for a fib    PLAN:    CNS: No depressants     HEENT: Oral care.     PULMONARY:  HOB @ 45 degrees. Aspiration precaution. On room air.    CARDIOVASCULAR: Echo reviewed, avoid overload.      GI: GI prophylaxis. Reglan. NG feeding.  Protonix IV Q12H. Hold bowel regimen.    RENAL:  Follow up lytes. Correct as needed. Free water flushes 300mL Q6H.    INFECTIOUS DISEASE: Completed antibiotic course.    HEMATOLOGICAL:  Monitor CBC and coags.      ENDOCRINE:  Follow up FS.  Insulin protocol if needed.    MUSCULOSKELETAL: Bed chair position. PT OT     DC TLC   Need peripheral access    Downgrade to medical floor IMPRESSION:    Acute hypoxemic respiratory failure improved   ARDS improved  Sepsis POA  Septic shock improved  ESBL UTI  Hypernatremia  COVID 19 pneumonia  NOLA  likely ATN vs prerenal improving  Possible CAP   HO congential unilateral kidney  HO A FIb  Melena / vaginal bleeding was on therapeutic Lovenox for a fib    PLAN:    CNS: No depressants     HEENT: Oral care.     PULMONARY:  HOB @ 45 degrees. Aspiration precaution. On room air.    CARDIOVASCULAR: Echo reviewed, avoid overload.      GI: GI prophylaxis. Reglan. NG feeding.  Protonix IV Q12H. Hold bowel regimen.    RENAL:  Follow up lytes. Correct as needed. Free water flushes 300mL Q6H.    INFECTIOUS DISEASE: Completed antibiotic course.    HEMATOLOGICAL:  Monitor CBC and coags.      ENDOCRINE:  Follow up FS.  Insulin protocol if needed.    MUSCULOSKELETAL: Bed chair position. PT OT     DC TLC   Need peripheral access    Downgrade to medical floor

## 2023-02-13 NOTE — PHARMACOTHERAPY INTERVENTION NOTE - NSPHARMCOMMASP
ASP - Dose optimization/Non-Renal dose adjustment
ASP - Renal dose adjustment
ASP - De-escalation
ASP - Renal dose adjustment
ASP - Renal dose adjustment
ASP - Duration of therapy

## 2023-02-13 NOTE — CONSULT NOTE ADULT - SUBJECTIVE AND OBJECTIVE BOX
Gastroenterology Consultation:    Patient is a 72y old  Female who presents with a chief complaint of respiratory distress (13 Feb 2023 14:33)        Admitted on: 01-31-23      HPI:  71 yo female kth afib, htn, solitary kidney presented from [facility] for SOB.   As per the family, the patient tested positive for covid 4 days ago and she has been feeling unwell. She was also SOB but she refused to visit the hospital.   Today she was altered and her oxygen dropped to 70s in the NH, in addition to a drop in her bp.   She presented to the ed and was placed on bipap with no improvement so she was intubated.   She tested positive for covid, and her ct scan showed mild right pleural effusion. admitted to MICU called to evaluate (01 Feb 2023 00:39)        Prior EGD:    Prior Colonoscopy:      PAST MEDICAL & SURGICAL HISTORY:  HTN (hypertension)      Diabetes mellitus      Obesity      Gastroesophageal reflux disease      Active asthma      Generalized OA      Afib      H/O hernia repair  2011 and revised in 2013      History of cholecystectomy            FAMILY HISTORY:      Social History:  Tobacco:  Alcohol:  Drugs:    Home Medications:  alendronate 70 mg oral tablet: 1 tab(s) orally once a week (01 Feb 2023 00:45)  carvedilol 12.5 mg oral tablet: 1 tab(s) orally 2 times a day (01 Feb 2023 00:48)  cyanocobalamin 1000 mcg oral tablet, extended release: 1 tab(s) orally once a day (01 Feb 2023 00:46)  labetalol 100 mg oral tablet: 1 tab(s) orally 2 times a day (06 Nov 2020 06:59)  losartan-hydrochlorothiazide 50 mg-12.5 mg oral tablet: 1 tab(s) orally once a day (01 Feb 2023 00:46)  magnesium oxide 400 mg oral tablet: 1 tab(s) orally once a day (08 Feb 2023 13:04)  NIFEdipine 90 mg oral tablet, extended release: 1 tab(s) orally once a day (01 Feb 2023 00:46)  oxycodone-acetaminophen 5 mg-325 mg oral tablet: 1 tab(s) orally every 6 hours, As Needed - 6) for severe pain  (06 Nov 2020 06:59)  potassium chloride 10 mEq oral capsule, extended release: 2 cap(s) orally once a day (06 Nov 2020 06:59)  rivaroxaban 20 mg oral tablet: 1 tab(s) orally once a day (before a meal) (06 Nov 2020 06:59)  sodium bicarbonate 650 mg oral tablet: 1 tab(s) orally once a day (01 Feb 2023 00:47)        MEDICATIONS  (STANDING):  chlorhexidine 2% Cloths 1 Application(s) Topical daily  insulin lispro (ADMELOG) corrective regimen sliding scale   SubCutaneous every 6 hours  magnesium oxide 400 milliGRAM(s) Oral daily  pantoprazole  Injectable 40 milliGRAM(s) IV Push every 12 hours  polyethylene glycol 3350 17 Gram(s) Oral daily    MEDICATIONS  (PRN):  acetaminophen     Tablet .. 650 milliGRAM(s) Oral every 6 hours PRN Temp greater or equal to 38C (100.4F), Mild Pain (1 - 3), Moderate Pain (4 - 6)  senna 2 Tablet(s) Oral at bedtime PRN Constipation      Allergies  No Known Allergies      Review of Systems:   Constitutional:  No Fever, No Chills  ENT/Mouth:  No Hearing Changes,  No Difficulty Swallowing  Eyes:  No Eye Pain, No Vision Changes  Cardiovascular:  No Chest Pain, No Palpitations  Respiratory:  No Cough, No Dyspnea  Gastrointestinal:  As described in HPI  Musculoskeletal:  No Joint Swelling, No Back Pain  Skin:  No Skin Lesions, No Jaundice  Neuro:  No Syncope, No Dizziness  Heme/Lymph:  No Bruising, No Bleeding.          Physical Examination:  T(C): 37.2 (02-13-23 @ 12:00), Max: 37.9 (02-12-23 @ 15:00)  HR: 73 (02-13-23 @ 13:00) (64 - 76)  BP: 148/65 (02-13-23 @ 13:00) (123/58 - 157/65)  RR: 27 (02-13-23 @ 13:00) (12 - 71)  SpO2: 99% (02-13-23 @ 13:00) (92% - 100%)      02-11-23 @ 07:01  -  02-12-23 @ 07:00  --------------------------------------------------------  IN: 466 mL / OUT: 2685 mL / NET: -2219 mL    02-12-23 @ 07:01  -  02-13-23 @ 07:00  --------------------------------------------------------  IN: 1150 mL / OUT: 2765 mL / NET: -1615 mL    02-13-23 @ 07:01  -  02-13-23 @ 14:44  --------------------------------------------------------  IN: 930 mL / OUT: 600 mL / NET: 330 mL          GENERAL: AAOx3, no acute distress.  HEAD:  Atraumatic, Normocephalic, NG tube in place   EYES: conjunctiva and sclera clear  NECK: Supple, no JVD or thyromegaly  CHEST/LUNG: Clear to auscultation bilaterally; No wheeze, rhonchi, or rales  HEART: Regular rate and rhythm; normal S1, S2, No murmurs.  ABDOMEN: Soft, nontender, nondistended; Bowel sounds present  NEUROLOGY: No asterixis or tremor.   SKIN: Intact, no jaundice        Data:                        8.3    9.92  )-----------( 191      ( 13 Feb 2023 04:50 )             26.9     Hgb Trend:  8.3  02-13-23 @ 04:50  8.0  02-12-23 @ 04:57  7.2  02-11-23 @ 03:58        02-13    148<H>  |  106  |  38<H>  ----------------------------<  116<H>  3.3<L>   |  34<H>  |  1.1    Ca    9.8      13 Feb 2023 04:50  Mg     1.8     02-13    TPro  4.9<L>  /  Alb  2.9<L>  /  TBili  0.9  /  DBili  x   /  AST  14  /  ALT  18  /  AlkPhos  99  02-13    Liver panel trend:  TBili 0.9   /   AST 14   /   ALT 18   /   AlkP 99   /   Tptn 4.9   /   Alb 2.9    /   DBili --      02-13  TBili 1.1   /   AST 13   /   ALT 20   /   AlkP 93   /   Tptn 4.5   /   Alb 2.7    /   DBili --      02-12  TBili 0.8   /   AST 12   /   ALT 24   /   AlkP 88   /   Tptn 4.4   /   Alb 2.7    /   DBili --      02-11  TBili 0.8   /   AST 15   /   ALT 32   /   AlkP 94   /   Tptn 4.9   /   Alb 2.8    /   DBili --      02-10  TBili 0.7   /   AST 18   /   ALT 35   /   AlkP 106   /   Tptn 5.0   /   Alb 3.0    /   DBili --      02-09  TBili 0.7   /   AST 22   /   ALT 34   /   AlkP 111   /   Tptn 4.9   /   Alb 2.7    /   DBili --      02-08  TBili 0.5   /   AST 11   /   ALT 20   /   AlkP 110   /   Tptn 4.5   /   Alb 2.6    /   DBili --      02-07  TBili 0.4   /   AST 12   /   ALT 18   /   AlkP 133   /   Tptn 5.0   /   Alb 2.8    /   DBili --      02-06  TBili 0.5   /   AST 7   /   ALT 14   /   AlkP 128   /   Tptn 5.0   /   Alb 2.8    /   DBili --      02-05  TBili 0.5   /   AST 6   /   ALT 14   /   AlkP 126   /   Tptn 4.4   /   Alb 2.5    /   DBili --      02-04              Radiology:

## 2023-02-13 NOTE — PHARMACOTHERAPY INTERVENTION NOTE - INTERVENTION CATEGORIES
Med Safety
Monitoring
Therapy
ASP
Therapy
ASP

## 2023-02-13 NOTE — PROGRESS NOTE ADULT - ASSESSMENT
PLAN    #Acute hypoxemic resp failure, extubated, no reintubated   #ARDS  #Sepsis POA  #Possible CAP  #COVID +  - MRSA (-), Strep (-), Legionella (-) on 2/1   - Covid + on 1/31/22  - CT Chest No Cont (01.31.23 @ 21:17):  Mild right pleural effusion with adjacent  atelectasis. Bibasilar atelectatic changes versus scarring. Partial obstruction of the distal right lower lobe bronchioles. Otherwise no  evidence of central endobronchial obstruction. No focal consolidation.  Right upper lobe pulmonary cyst (4-130). No pleural effusion. No  pneumothorax.  - Xray Chest 1 View-PORTABLE IMMEDIATE (Xray Chest 1 View-PORTABLE IMMEDIATE .) (02.01.23 @ 06:31):  Worsening opacifications and effusions. Support devices as described.  - No RDV given as patient was out of the window and had NOLA earlier in the hospital course   - c/w dexamethasone 6 mg q24h ( 2/1 -2/2, 2/4- current) for 10 days   - No TOCI given as patient had positive blood cultures   - D-Dimer 818, Ferritin 347, LDH (203 --> 139), CRP (107.1 --> 42), procal ( 14.3 -->37.2 --> 49.6)   - daily CXR  - daily ABG   - extubated 2/7, desaturated on BIPAP, now reintubated 2/9  - extubated 2/11    #Septic shock  #ESBL UTI  #G+C bacteremia/ COAG - STAP  - UA 1/31 showed LE+, WBC+, Bacteria+, Epithelial cells 27+  - UCx: proteus mirabilis ESBL   - BCx: staph epidermidis mehicillin resistant ( most likely contaminanant as per ID)   - s/p Levaquin ( 1/31 - 2/3), Rocephin ( 2/1-2/3),   - c/w on Meropenem 500 mg q24h ( 2/3 - 2/13)   - ID following     #NOLA  likely ATN vs prerenal improving  #H/O congenital unilateral kidney  #Hypercalcemia   - Renal US 2/4/23: No right hydronephrosis; left kidney congenitally absent   - Creat trending down ( 5.1 --> 2.1 --> 1.6)   - strict i/o  - Corrected Ca around 11/ PTH mediated; needs hypercalcemia workup once extubated   - no need for RRT at this time   - Renally dose medications   - nephrology following   - Lasix 40 IV once 2/7  - Metolazone 5mg given 2/9     #H/O A Fib  - rate controlled   - Holding AC    #Low TSH; 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   - TSH: 0.15   - Total T3: 1.23  - Free T4: 1.2    #HTN  - Started labetalol 100mg q8h and metolazone 5mg   - DC hydrochlorothiazide   - continue losartan and amlodipine     #Dysphagia  - speech and swallow - did not pass FEES  - NPO for now  - ENT for damaged vocal cords - will follow    - reconsulted since second extubation 2/11  - will discuss PEG tube placement with patient and family     #hypernatremia  - d5 and free water flushes   - trend BMP    #Melena  - fu CBC  - Type and screen active   - coags active   - gastric lavage negative    #Vaginal bleeding  - Consult OBGYN - transvaginal US and outpatient endometrial biopsy      #DVT PPx: on hold   #GI PPx: Pantoprazole   #Activity: IAT   #Diet: NPO  #Code: Full

## 2023-02-13 NOTE — CONSULT NOTE ADULT - ASSESSMENT
71yo F w/ pmhx of afib on xarelto, HTN, solitary kidney presented from [facility] for SOB. As per the family, the patient tested positive for covid 4 days Prior to presentation and she has been feeling unwell. She was also SOB but she refused to visit the hospital. Patient was altered and her oxygen dropped to 70s in the NH, in addition to a drop in her bp. She presented to the ed and was placed on bipap with no improvement so she was intubated.     Patient was intubated on 1/31, extubated on 2/7, reintubated on 2/9, and extubated again on 2/11. Spoke to daughter at bedside. Patient has been intubated in the past multiple times for surgeries (i.e for hernia repair). 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 Patient has failed multiple speech and swallow evals and currently has NG tube for feeds. ENT evaluated patient and stated that patient would need to have repeat laryngoscopy/FEES to reassess vocal cords in 1 week from 2/9. GI consulted because patient is interested in PEG. Hospital course complicated by vaginal bleeding.     Impression:   #dysphagia and dysphonia post extubation   - on NG tube feeds   - patient failed speech and swallow and is currently on NG tube feeds   - FEES 2/8: b/l and posterior commissure intubation granulomas suspected w/ large glottic gap upon phonation  - ENT said patient would need repeat laryngoscopy in 1 week from 2/9 to reassess vocal cords  - speech and swallow informed family that patient may possibly need one month of speech therapy before she can swallow again     Recommendations:   -    73yo F w/ pmhx of afib on xarelto, HTN, solitary kidney presented from [facility] for SOB. As per the family, the patient tested positive for covid 4 days Prior to presentation and she has been feeling unwell. She was also SOB but she refused to visit the hospital. Patient was altered and her oxygen dropped to 70s in the NH, in addition to a drop in her bp. She presented to the ed and was placed on bipap with no improvement so she was intubated.     Patient was intubated on 1/31, extubated on 2/7, reintubated on 2/9, and extubated again on 2/11. Spoke to daughter at bedside. Patient has been intubated in the past multiple times for surgeries (i.e for hernia repair). 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 Patient has failed multiple speech and swallow evals and currently has NG tube for feeds. ENT evaluated patient and stated that patient would need to have repeat laryngoscopy/FEES to reassess vocal cords in 1 week from 2/9. GI consulted because patient is interested in PEG. Hospital course complicated by vaginal bleeding.     Impression:   #dysphagia and dysphonia post extubation   - on NG tube feeds   - patient failed speech and swallow and is currently on NG tube feeds   - FEES 2/8: b/l and posterior commissure intubation granulomas suspected w/ large glottic gap upon phonation  - ENT said patient would need repeat laryngoscopy in 1 week from 2/9 to reassess vocal cords  - speech and swallow informed family that patient may possibly need one month of speech therapy before she can swallow again     Recommendations:   - May schedule PEG this week   - please correct electrolytes (if K < 4 and mag < 2)   - get coags, maintain active type and screen

## 2023-02-13 NOTE — PROGRESS NOTE ADULT - SUBJECTIVE AND OBJECTIVE BOX
Patient is a 72y old  Female who presents with a chief complaint of respiratory distress (12 Feb 2023 19:24)        Over Night Events: none. Off pressors        ROS:     All ROS are negative except HPI       PHYSICAL EXAM    ICU Vital Signs Last 24 Hrs  T(C): 37.1 (13 Feb 2023 08:00), Max: 37.9 (12 Feb 2023 15:00)  T(F): 98.8 (13 Feb 2023 08:00), Max: 100.2 (12 Feb 2023 15:00)  HR: 72 (13 Feb 2023 09:00) (64 - 76)  BP: 133/63 (13 Feb 2023 09:00) (123/58 - 161/70)  BP(mean): 91 (13 Feb 2023 09:00) (84 - 114)  ABP: --  ABP(mean): --  RR: 28 (13 Feb 2023 09:00) (12 - 71)  SpO2: 92% (13 Feb 2023 09:00) (92% - 100%)    O2 Parameters below as of 13 Feb 2023 10:00  Patient On (Oxygen Delivery Method): nasal cannula    O2 Concentration (%): 3        Constitutional: no acute distress, obese  Neuro: moving all 4 limbs spontaneously, no facial droop or dysarthria  HEENT: NCAT, anicteric  Neck: no visible lymphadenopathy or goiter  Pulm: no respiratory distress. clear to auscultation bilaterally  Cardiac: extremities appear pink and well-perfused.  regular rhythm and rate, no murmur detected  Abdomen: non-distended  Extremities: no peripheral edema      02-12-23 @ 07:01  -  02-13-23 @ 07:00  --------------------------------------------------------  IN:    Enteral Tube Flush: 110 mL    IV PiggyBack: 150 mL    Peptamen A.F.: 890 mL  Total IN: 1150 mL    OUT:    Indwelling Catheter - Urethral (mL): 1965 mL    Rectal Tube (mL): 500 mL    Voided (mL): 300 mL  Total OUT: 2765 mL    Total NET: -1615 mL      02-13-23 @ 07:01  -  02-13-23 @ 09:58  --------------------------------------------------------  IN:    Peptamen A.F.: 180 mL  Total IN: 180 mL    OUT:    Voided (mL): 200 mL  Total OUT: 200 mL    Total NET: -20 mL          LABS:                            8.3    9.92  )-----------( 191      ( 13 Feb 2023 04:50 )             26.9                                               02-13    148<H>  |  106  |  38<H>  ----------------------------<  116<H>  3.3<L>   |  34<H>  |  1.1    Ca    9.8      13 Feb 2023 04:50  Mg     1.8     02-13    TPro  4.9<L>  /  Alb  2.9<L>  /  TBili  0.9  /  DBili  x   /  AST  14  /  ALT  18  /  AlkPhos  99  02-13                                                                                           LIVER FUNCTIONS - ( 13 Feb 2023 04:50 )  Alb: 2.9 g/dL / Pro: 4.9 g/dL / ALK PHOS: 99 U/L / ALT: 18 U/L / AST: 14 U/L / GGT: x                                                                                                                                   ABG - ( 11 Feb 2023 14:59 )  pH, Arterial: 7.48  pH, Blood: x     /  pCO2: 50    /  pO2: 73    / HCO3: 37    / Base Excess: 11.8  /  SaO2: 95.0                MEDICATIONS  (STANDING):  chlorhexidine 2% Cloths 1 Application(s) Topical daily  insulin lispro (ADMELOG) corrective regimen sliding scale   SubCutaneous every 6 hours  magnesium oxide 400 milliGRAM(s) Oral daily  meropenem  IVPB      meropenem  IVPB 1000 milliGRAM(s) IV Intermittent every 8 hours  pantoprazole  Injectable 40 milliGRAM(s) IV Push every 12 hours  polyethylene glycol 3350 17 Gram(s) Oral daily  senna 2 Tablet(s) Oral at bedtime    MEDICATIONS  (PRN):  acetaminophen     Tablet .. 650 milliGRAM(s) Oral every 6 hours PRN Temp greater or equal to 38C (100.4F), Mild Pain (1 - 3), Moderate Pain (4 - 6)    CXR reviewed by myself     Patient is a 72y old  Female who presents with a chief complaint of respiratory distress (12 Feb 2023 19:24)        Over Night Events: Off pressors.  On RA          ROS:     All ROS are negative except HPI       PHYSICAL EXAM    ICU Vital Signs Last 24 Hrs  T(C): 37.1 (13 Feb 2023 08:00), Max: 37.9 (12 Feb 2023 15:00)  T(F): 98.8 (13 Feb 2023 08:00), Max: 100.2 (12 Feb 2023 15:00)  HR: 72 (13 Feb 2023 09:00) (64 - 76)  BP: 133/63 (13 Feb 2023 09:00) (123/58 - 161/70)  BP(mean): 91 (13 Feb 2023 09:00) (84 - 114)  ABP: --  ABP(mean): --  RR: 28 (13 Feb 2023 09:00) (12 - 71)  SpO2: 92% (13 Feb 2023 09:00) (92% - 100%)    O2 Parameters below as of 13 Feb 2023 10:00  Patient On (Oxygen Delivery Method): nasal cannula    O2 Concentration (%): 3        Constitutional: no acute distress, obese  Neuro: moving all 4 limbs spontaneously, no facial droop or dysarthria  HEENT: NCAT, anicteric  Neck: no visible lymphadenopathy or goiter  Pulm: no respiratory distress. clear to auscultation bilaterally  Cardiac: extremities appear pink and well-perfused.  regular rhythm and rate, no murmur detected  Abdomen: non-distended  Extremities: no peripheral edema      02-12-23 @ 07:01  -  02-13-23 @ 07:00  --------------------------------------------------------  IN:    Enteral Tube Flush: 110 mL    IV PiggyBack: 150 mL    Peptamen A.F.: 890 mL  Total IN: 1150 mL    OUT:    Indwelling Catheter - Urethral (mL): 1965 mL    Rectal Tube (mL): 500 mL    Voided (mL): 300 mL  Total OUT: 2765 mL    Total NET: -1615 mL      02-13-23 @ 07:01  -  02-13-23 @ 09:58  --------------------------------------------------------  IN:    Peptamen A.F.: 180 mL  Total IN: 180 mL    OUT:    Voided (mL): 200 mL  Total OUT: 200 mL    Total NET: -20 mL          LABS:                            8.3    9.92  )-----------( 191      ( 13 Feb 2023 04:50 )             26.9                                               02-13    148<H>  |  106  |  38<H>  ----------------------------<  116<H>  3.3<L>   |  34<H>  |  1.1    Ca    9.8      13 Feb 2023 04:50  Mg     1.8     02-13    TPro  4.9<L>  /  Alb  2.9<L>  /  TBili  0.9  /  DBili  x   /  AST  14  /  ALT  18  /  AlkPhos  99  02-13                                                                                           LIVER FUNCTIONS - ( 13 Feb 2023 04:50 )  Alb: 2.9 g/dL / Pro: 4.9 g/dL / ALK PHOS: 99 U/L / ALT: 18 U/L / AST: 14 U/L / GGT: x                                                                                                                                   ABG - ( 11 Feb 2023 14:59 )  pH, Arterial: 7.48  pH, Blood: x     /  pCO2: 50    /  pO2: 73    / HCO3: 37    / Base Excess: 11.8  /  SaO2: 95.0                MEDICATIONS  (STANDING):  chlorhexidine 2% Cloths 1 Application(s) Topical daily  insulin lispro (ADMELOG) corrective regimen sliding scale   SubCutaneous every 6 hours  magnesium oxide 400 milliGRAM(s) Oral daily  meropenem  IVPB      meropenem  IVPB 1000 milliGRAM(s) IV Intermittent every 8 hours  pantoprazole  Injectable 40 milliGRAM(s) IV Push every 12 hours  polyethylene glycol 3350 17 Gram(s) Oral daily  senna 2 Tablet(s) Oral at bedtime    MEDICATIONS  (PRN):  acetaminophen     Tablet .. 650 milliGRAM(s) Oral every 6 hours PRN Temp greater or equal to 38C (100.4F), Mild Pain (1 - 3), Moderate Pain (4 - 6)    CXR reviewed by myself

## 2023-02-13 NOTE — PROGRESS NOTE ADULT - SUBJECTIVE AND OBJECTIVE BOX
NUTRITION SUPPORT TEAM  -  PROGRESS NOTE   s/p extubation on N/C  npo now  failed swallow evaluation   ENT f/u pending    REVIEW OF SYSTEMS:  Negative except as noted above.     VITALS:  T(F): 98.9 (02-13 @ 12:00), Max: 98.9 (02-13 @ 12:00)  HR: 73 (02-13 @ 13:00) (68 - 73)  BP: 148/65 (02-13 @ 13:00) (129/63 - 150/63)  RR: 27 (02-13 @ 13:00) (17 - 38)  SpO2: 99% (02-13 @ 13:00) (92% - 99%)    HEIGHT/WEIGHT/BMI:   Height (cm): 165.1 (02-01)  Weight (kg): 110 (01-31)  BMI (kg/m2): 40.4 (02-01)      PHYSICAL EXAM:   GENERAL: NAD, well-groomed, well-developed  HEENT: Moist mucous membranes, Good dentition, No lesions  ABDOMEN: Soft, Nontender, Nondistended  EXTREMITIES:  No clubbing, cyanosis, or edema  SKIN: warm and well perfused; No obvious rashes or lesions  IV ACCESS:   ENTERAL ACCESS:     I/Os:     02-12-23 @ 07:01  -  02-13-23 @ 07:00  --------------------------------------------------------  IN:    Enteral Tube Flush: 110 mL    IV PiggyBack: 150 mL    Peptamen A.F.: 890 mL  Total IN: 1150 mL    OUT:    Indwelling Catheter - Urethral (mL): 1965 mL    Rectal Tube (mL): 500 mL    Voided (mL): 300 mL  Total OUT: 2765 mL  Total NET: -1615 mL    STANDING MEDICATIONS:   chlorhexidine 2% Cloths 1 Application(s) Topical daily  insulin lispro (ADMELOG) corrective regimen sliding scale   SubCutaneous every 6 hours  magnesium oxide 400 milliGRAM(s) Oral daily  pantoprazole  Injectable 40 milliGRAM(s) IV Push every 12 hours  polyethylene glycol 3350 17 Gram(s) Oral daily      LABS:                         8.3    9.92  )-----------( 191      ( 13 Feb 2023 04:50 )             26.9     148<H>  |  106  |  38<H>  ----------------------------<  116<H>          (02-13-23 @ 04:50)  3.3<L>   |  34<H>  |  1.1    Ca    9.8          (02-13-23 @ 04:50)  Mg     1.8         (02-13-23 @ 04:50)  TPro  4.9<L>  /  Alb  2.9<L>  /  TBili  0.9  /  DBili  x   /  AST  14  /  ALT  18  /  AlkPhos  99       02-13-23 @ 04:50  Vitamin D, 25-Hydroxy: <6 ng/mL (02-04 @ 20:22)  Blood Glucose (Past 24 hours):  130 mg/dL (02-13 @ 11:17)  128 mg/dL (02-13 @ 05:55)  114 mg/dL (02-12 @ 23:29)  DIET:   Diet, NPO with Tube Feed:   Tube Feeding Modality: Nasogastric  Peptamen A.F. Formula  Total Volume for 24 Hours (mL): 1440  Continuous  Starting Tube Feed Rate mL per Hour: 25  Increase Tube Feed Rate by (mL): 10     Every 4 hours  Until Goal Tube Feed Rate (mL per Hour): 60  Tube Feed Duration (in Hours): 24  Tube Feed Start Time: 10:00  Free Water Flush  Bolus   Total Volume per Flush (mL): 300   Frequency: Every 6 Hours (02-13-23 @ 10:07) [Active]  RADIOLOGY:   < from: Xray Chest 1 View- PORTABLE-Routine (Xray Chest 1 View- PORTABLE-Routine in AM.) (02.13.23 @ 06:47) >  Impression:  Stable bilateral lung opacities

## 2023-02-13 NOTE — CHART NOTE - NSCHARTNOTEFT_GEN_A_CORE
ICU Transfer Note    Transfer from: MICU    Transfer to: ( x ) Medicine    (  ) Telemetry    (  ) RCU                               (  ) Palliative    (  ) Stroke Unit    (  ) MICU    (  ) CCU    ----------------------------------------------------------------------------------------------------------  HPI / ICU COURSE:        ---------------------------------------------------------------------------------------------------  ASSESSMENT & PLAN:     Impression:  Acute hypoxemic respiratory failure   ARDS improved  Sepsis POA  Septic shock improved  ESBL UTI  Hypernatremia  COVID 19 pneumonia  NOLA  likely ATN vs prerenal improving  Possible CAP   HO congential unilateral kidney  HO A FIb  Melena / vaginal bleeding was on therapeutic Lovenox for a fib      PLAN:    #Acute hypoxemic resp failure, extubated, no reintubated   #ARDS  #Sepsis POA  #Possible CAP  #COVID +  - MRSA (-), Strep (-), Legionella (-) on 2/1   - Covid + on 1/31/22  - CT Chest No Cont (01.31.23 @ 21:17):  Mild right pleural effusion with adjacent  atelectasis. Bibasilar atelectatic changes versus scarring. Partial obstruction of the distal right lower lobe bronchioles. Otherwise no  evidence of central endobronchial obstruction. No focal consolidation.  Right upper lobe pulmonary cyst (4-130). No pleural effusion. No  pneumothorax.  - Xray Chest 1 View-PORTABLE IMMEDIATE (Xray Chest 1 View-PORTABLE IMMEDIATE .) (02.01.23 @ 06:31):  Worsening opacifications and effusions. Support devices as described.  - No RDV given as patient was out of the window and had NOLA earlier in the hospital course   - c/w dexamethasone 6 mg q24h ( 2/1 -2/2, 2/4- current) for 10 days   - No TOCI given as patient had positive blood cultures   - D-Dimer 818, Ferritin 347, LDH (203 --> 139), CRP (107.1 --> 42), procal ( 14.3 -->37.2 --> 49.6)   - daily CXR  - daily ABG   - extubated 2/7, desaturated on BIPAP, now reintubated 2/9  - extubated 2/11    #Septic shock  #ESBL UTI  #G+C bacteremia/ COAG - STAP  - UA 1/31 showed LE+, WBC+, Bacteria+, Epithelial cells 27+  - UCx: proteus mirabilis ESBL   - BCx: staph epidermidis mehicillin resistant ( most likely contaminanant as per ID)   - s/p Levaquin ( 1/31 - 2/3), Rocephin ( 2/1-2/3),   - s/p Meropenem 500 mg q24h ( 2/3 - 2/13)   - ID following     #NOLA  likely ATN vs prerenal improving  #H/O congenital unilateral kidney  #Hypercalcemia   - Renal US 2/4/23: No right hydronephrosis; left kidney congenitally absent   - Creat trending down ( 5.1 --> 2.1 --> 1.6)   - strict i/o  - Corrected Ca around 11/ PTH mediated; needs hypercalcemia workup once extubated   - no need for RRT at this time   - Renally dose medications   - nephrology following   - Lasix 40 IV once 2/7  - Metolazone 5mg given 2/9     #H/O A Fib  - rate controlled   - Holding AC    #Low TSH; 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   - TSH: 0.15   - Total T3: 1.23  - Free T4: 1.2    #HTN  - Started labetalol 100mg q8h and metolazone 5mg   - DC hydrochlorothiazide   - continue losartan and amlodipine     #Dysphagia  - speech and swallow - did not pass FEES  - NPO for now  - ENT for damaged vocal cords - will follow    - reconsulted since second extubation 2/11  - will discuss PEG tube placement with patient and family     #hypernatremia  - d5 and free water flushes   - trend BMP    #Melena, improved  - fu CBC  - Type and screen active   - coags active   - gastric lavage negative    #Vaginal bleeding, resolved  - Consult OBGYN - transvaginal US and outpatient endometrial biopsy      #DVT PPx: on hold   #GI PPx: Pantoprazole   #Activity: IAT   #Diet: NGT feeds  #Code: Full      FOR FOLLOW UP:  - hypercalcemia work up   - CMP for hypernatremia  - PEG tube placement, GI consulted  - Transvaginal US for vaginal bleed ICU Transfer Note    Transfer from: MICU    Transfer to: ( x ) Medicine    (  ) Telemetry    (  ) RCU                               (  ) Palliative    (  ) Stroke Unit    (  ) MICU    (  ) CCU    ----------------------------------------------------------------------------------------------------------  HPI / ICU COURSE:  Admitted from the ED on 1/31 for acute hypoxic resp failure, COVID, pleural effusion, acute renal failure, UTI. Intubated in ED. Patient was septic and finished course of Ceftriaxone, Levofloxacin, Vanco,  Meropenum for ESBL UTI, and Decadron for COVID. NOLA and sepsis resolved. Extubated on 2/7, but she threw a mucous plug, desaturated, and had to be reintubated on 2/9. Again extubated on 2/11, did well on BIPAP, now transitioned to nasal canula. Patient did not pass speech and swallow eval, including FEES, so GI was consulted for PEG placement for feeding.       ---------------------------------------------------------------------------------------------------  ASSESSMENT & PLAN:     Impression:  Acute hypoxemic respiratory failure   ARDS improved  Sepsis POA  Septic shock improved  ESBL UTI  Hypernatremia  COVID 19 pneumonia  NOLA  likely ATN vs prerenal improving  Possible CAP   HO congential unilateral kidney  HO A FIb  Melena / vaginal bleeding was on therapeutic Lovenox for a fib      PLAN:    #Acute hypoxemic resp failure, extubated, no reintubated   #ARDS  #Sepsis POA  #Possible CAP  #COVID +  - MRSA (-), Strep (-), Legionella (-) on 2/1   - Covid + on 1/31/22  - CT Chest No Cont (01.31.23 @ 21:17):  Mild right pleural effusion with adjacent  atelectasis. Bibasilar atelectatic changes versus scarring. Partial obstruction of the distal right lower lobe bronchioles. Otherwise no  evidence of central endobronchial obstruction. No focal consolidation.  Right upper lobe pulmonary cyst (4-130). No pleural effusion. No  pneumothorax.  - Xray Chest 1 View-PORTABLE IMMEDIATE (Xray Chest 1 View-PORTABLE IMMEDIATE .) (02.01.23 @ 06:31):  Worsening opacifications and effusions. Support devices as described.  - No RDV given as patient was out of the window and had NOLA earlier in the hospital course   - c/w dexamethasone 6 mg q24h ( 2/1 -2/2, 2/4- current) for 10 days   - No TOCI given as patient had positive blood cultures   - D-Dimer 818, Ferritin 347, LDH (203 --> 139), CRP (107.1 --> 42), procal ( 14.3 -->37.2 --> 49.6)   - daily CXR  - daily ABG   - extubated 2/7, desaturated on BIPAP, now reintubated 2/9  - extubated 2/11    #Septic shock  #ESBL UTI  #G+C bacteremia/ COAG - STAP  - UA 1/31 showed LE+, WBC+, Bacteria+, Epithelial cells 27+  - UCx: proteus mirabilis ESBL   - BCx: staph epidermidis mehicillin resistant ( most likely contaminanant as per ID)   - s/p Levaquin ( 1/31 - 2/3), Rocephin ( 2/1-2/3),   - s/p Meropenem 500 mg q24h ( 2/3 - 2/13)   - ID following     #NOLA  likely ATN vs prerenal improving  #H/O congenital unilateral kidney  #Hypercalcemia   - Renal US 2/4/23: No right hydronephrosis; left kidney congenitally absent   - Creat trending down ( 5.1 --> 2.1 --> 1.6)   - strict i/o  - Corrected Ca around 11/ PTH mediated; needs hypercalcemia workup once extubated   - no need for RRT at this time   - Renally dose medications   - nephrology following   - Lasix 40 IV once 2/7  - Metolazone 5mg given 2/9     #H/O A Fib  - rate controlled   - Holding AC    #Low TSH; 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   - TSH: 0.15   - Total T3: 1.23  - Free T4: 1.2    #HTN  - Started labetalol 100mg q8h and metolazone 5mg   - DC hydrochlorothiazide   - continue losartan and amlodipine     #Dysphagia  - speech and swallow - did not pass FEES  - NPO for now  - ENT for damaged vocal cords - will follow    - reconsulted since second extubation 2/11  - will discuss PEG tube placement with patient and family     #hypernatremia  - d5 and free water flushes   - trend BMP    #Melena, improved  - fu CBC  - Type and screen active   - coags active   - gastric lavage negative    #Vaginal bleeding, resolved  - Consult OBGYN - transvaginal US and outpatient endometrial biopsy      #DVT PPx: on hold   #GI PPx: Pantoprazole   #Activity: IAT   #Diet: NGT feeds  #Code: Full      FOR FOLLOW UP:  - hypercalcemia work up   - CMP for hypernatremia  - PEG tube placement, GI consulted  - Transvaginal US for vaginal bleed

## 2023-02-13 NOTE — PROGRESS NOTE ADULT - SUBJECTIVE AND OBJECTIVE BOX
Patient is a 72y old  Female who presents with a chief complaint of respiratory distress (13 Feb 2023 10:12)      INTERVAL HPI/OVERNIGHT EVENTS:   No overnight events   Afebrile, hemodynamically stable     ICU Vital Signs Last 24 Hrs  T(C): 37.1 (13 Feb 2023 08:00), Max: 37.9 (12 Feb 2023 15:00)  T(F): 98.8 (13 Feb 2023 08:00), Max: 100.2 (12 Feb 2023 15:00)  HR: 72 (13 Feb 2023 09:00) (64 - 76)  BP: 133/63 (13 Feb 2023 09:00) (123/58 - 157/65)  BP(mean): 91 (13 Feb 2023 09:00) (84 - 114)  ABP: --  ABP(mean): --  RR: 28 (13 Feb 2023 09:00) (12 - 71)  SpO2: 92% (13 Feb 2023 09:00) (92% - 100%)    O2 Parameters below as of 13 Feb 2023 10:00  Patient On (Oxygen Delivery Method): nasal cannula    O2 Concentration (%): 3      I&O's Summary    12 Feb 2023 07:01  -  13 Feb 2023 07:00  --------------------------------------------------------  IN: 1150 mL / OUT: 2765 mL / NET: -1615 mL    13 Feb 2023 07:01  -  13 Feb 2023 11:25  --------------------------------------------------------  IN: 180 mL / OUT: 200 mL / NET: -20 mL          LABS:                        8.3    9.92  )-----------( 191      ( 13 Feb 2023 04:50 )             26.9     02-13    148<H>  |  106  |  38<H>  ----------------------------<  116<H>  3.3<L>   |  34<H>  |  1.1    Ca    9.8      13 Feb 2023 04:50  Mg     1.8     02-13    TPro  4.9<L>  /  Alb  2.9<L>  /  TBili  0.9  /  DBili  x   /  AST  14  /  ALT  18  /  AlkPhos  99  02-13        CAPILLARY BLOOD GLUCOSE      POCT Blood Glucose.: 130 mg/dL (13 Feb 2023 11:17)  POCT Blood Glucose.: 128 mg/dL (13 Feb 2023 05:55)  POCT Blood Glucose.: 114 mg/dL (12 Feb 2023 23:29)  POCT Blood Glucose.: 77 mg/dL (12 Feb 2023 23:27)  POCT Blood Glucose.: 103 mg/dL (12 Feb 2023 18:05)  POCT Blood Glucose.: 95 mg/dL (12 Feb 2023 12:08)    ABG - ( 11 Feb 2023 14:59 )  pH, Arterial: 7.48  pH, Blood: x     /  pCO2: 50    /  pO2: 73    / HCO3: 37    / Base Excess: 11.8  /  SaO2: 95.0                RADIOLOGY & ADDITIONAL TESTS:    Consultant(s) Notes Reviewed:  [x ] YES  [ ] NO    MEDICATIONS  (STANDING):  chlorhexidine 2% Cloths 1 Application(s) Topical daily  insulin lispro (ADMELOG) corrective regimen sliding scale   SubCutaneous every 6 hours  magnesium oxide 400 milliGRAM(s) Oral daily  pantoprazole  Injectable 40 milliGRAM(s) IV Push every 12 hours  polyethylene glycol 3350 17 Gram(s) Oral daily  senna 2 Tablet(s) Oral at bedtime    MEDICATIONS  (PRN):  acetaminophen     Tablet .. 650 milliGRAM(s) Oral every 6 hours PRN Temp greater or equal to 38C (100.4F), Mild Pain (1 - 3), Moderate Pain (4 - 6)      PHYSICAL EXAM:  GENERAL: resting comfortably   HEAD:  Atraumatic, Normocephalic  EYES: EOMI, PERRLA, conjunctiva and sclera clear  NECK: Supple, No JVD, Normal thyroid, no enlarged nodes  NERVOUS SYSTEM:  Alert & Awake. Following commands  CHEST/LUNG: B/L good air entry; No rales, rhonchi, or wheezing  HEART: S1S2 normal, no S3, Regular rate and rhythm; No murmurs  ABDOMEN: Soft, Nontender, Nondistended; Bowel sounds present  EXTREMITIES:  2+ Peripheral Pulses, No clubbing, cyanosis, or edema  LYMPH: No lymphadenopathy noted  SKIN: No rashes or lesions    Care Discussed with Consultants/Other Providers [ x] YES  [ ] NO

## 2023-02-13 NOTE — PROGRESS NOTE ADULT - SUBJECTIVE AND OBJECTIVE BOX
DARCIE PATRICIA  72y, Female  Allergy: No Known Allergies      LOS  13d    CHIEF COMPLAINT: respiratory distress (13 Feb 2023 09:57)      INTERVAL EVENTS/HPI  - No acute events overnight  - T(F): , Max: 100.2 (02-12-23 @ 15:00)  - noted events over weekend -- extubated, following some commands  - WBC Count: 9.92 (02-13-23 @ 04:50)  WBC Count: 9.73 (02-12-23 @ 04:57)     - Creatinine, Serum: 1.1 (02-13-23 @ 04:50)  Creatinine, Serum: 1.2 (02-12-23 @ 04:57)       ROS  General: Denies rigors, nightsweats  HEENT: Denies headache, rhinorrhea, sore throat, eye pain  CV: Denies CP, palpitations  PULM: Denies wheezing, hemoptysis  GI: Denies hematemesis, hematochezia, melena  : Denies discharge, hematuria  MSK: Denies arthralgias, myalgias  SKIN: Denies rash, lesions  NEURO: Denies paresthesias, weakness  PSYCH: Denies depression, anxiety    VITALS:  T(F): 98.8, Max: 100.2 (02-12-23 @ 15:00)  HR: 72  BP: 133/63  RR: 28Vital Signs Last 24 Hrs  T(C): 37.1 (13 Feb 2023 08:00), Max: 37.9 (12 Feb 2023 15:00)  T(F): 98.8 (13 Feb 2023 08:00), Max: 100.2 (12 Feb 2023 15:00)  HR: 72 (13 Feb 2023 09:00) (64 - 76)  BP: 133/63 (13 Feb 2023 09:00) (123/58 - 157/78)  BP(mean): 91 (13 Feb 2023 09:00) (84 - 114)  RR: 28 (13 Feb 2023 09:00) (12 - 71)  SpO2: 92% (13 Feb 2023 09:00) (92% - 100%)    Parameters below as of 13 Feb 2023 10:00  Patient On (Oxygen Delivery Method): nasal cannula    O2 Concentration (%): 3    PHYSICAL EXAM:  Gen: NAD, resting in bed  HEENT: Normocephalic, atraumatic  Neck: supple, no lymphadenopathy  CV: Regular rate & regular rhythm  Lungs: decreased BS at bases, no fremitus  Abdomen: Soft, BS present  Ext: Warm, well perfused  Neuro: non focal, awake  Skin: no rash, no erythema  Lines: no phlebitis    FH: Non-contributory  Social Hx: Non-contributory    TESTS & MEASUREMENTS:                        8.3    9.92  )-----------( 191      ( 13 Feb 2023 04:50 )             26.9     02-13    148<H>  |  106  |  38<H>  ----------------------------<  116<H>  3.3<L>   |  34<H>  |  1.1    Ca    9.8      13 Feb 2023 04:50  Mg     1.8     02-13    TPro  4.9<L>  /  Alb  2.9<L>  /  TBili  0.9  /  DBili  x   /  AST  14  /  ALT  18  /  AlkPhos  99  02-13      LIVER FUNCTIONS - ( 13 Feb 2023 04:50 )  Alb: 2.9 g/dL / Pro: 4.9 g/dL / ALK PHOS: 99 U/L / ALT: 18 U/L / AST: 14 U/L / GGT: x               Culture - Sputum (collected 02-03-23 @ 09:46)  Source: Trach Asp Tracheal Aspirate  Gram Stain (02-03-23 @ 23:04):    Few polymorphonuclear leukocytes per low power field    Rare Squamous epithelial cells per low power field    Rare Gram positive cocci in pairs per oil power field  Final Report (02-05-23 @ 15:11):    Normal Respiratory Samia present    Culture - Urine (collected 01-31-23 @ 20:15)  Source: Clean Catch Clean Catch (Midstream)  Final Report (02-03-23 @ 07:37):    >100,000 CFU/ml Proteus mirabilis ESBL  Organism: Proteus mirabilis ESBL (02-03-23 @ 07:37)  Organism: Proteus mirabilis ESBL (02-03-23 @ 07:37)      -  Amikacin: S <=16      -  Amoxicillin/Clavulanic Acid: S <=8/4      -  Ampicillin: R >16 These ampicillin results predict results for amoxicillin      -  Ampicillin/Sulbactam: S <=4/2 Enterobacter, Klebsiella aerogenes, Citrobacter, and Serratia may develop resistance during prolonged therapy (3-4 days)      -  Aztreonam: R <=4      -  Cefazolin: R >16 For uncomplicated UTI with K. pneumoniae, E. coli, or P. mirablis: NAGI <=16 is sensitive and NAGI >=32 is resistant. This also predicts results for oral agents cefaclor, cefdinir, cefpodoxime, cefprozil, cefuroxime axetil, cephalexin and locarbef for uncomplicated UTI. Note that some isolates may be susceptible to these agents while testing resistant to cefazolin.      -  Cefepime: R >16      -  Ceftriaxone: R >32 Enterobacter, Klebsiella aerogenes, Citrobacter, and Serratia may develop resistance during prolonged therapy      -  Cefuroxime: R >16      -  Ciprofloxacin: R >2      -  Ertapenem: S <=0.5      -  Gentamicin: S <=2      -  Levofloxacin: R >4      -  Meropenem: S <=1      -  Nitrofurantoin: R >64 Should not be used to treat pyelonephritis      -  Piperacillin/Tazobactam: S <=8      -  Tobramycin: S <=2      -  Trimethoprim/Sulfamethoxazole: R >2/38      Method Type: NAGI    Culture - Blood (collected 01-31-23 @ 19:03)  Source: .Blood Blood-Peripheral  Final Report (02-06-23 @ 01:00):    No Growth Final    Culture - Blood (collected 01-31-23 @ 19:03)  Source: .Blood Blood-Peripheral  Gram Stain (02-02-23 @ 08:28):    Growth in aerobic bottle: Gram Positive Cocci in Clusters  Final Report (02-02-23 @ 21:27):    Growth in aerobic bottle: Staphylococcus epidermidis    Coag Negative Staphylococcus    Single set isolate, possible contaminant. Contact    Microbiology if susceptibility testing clinically    indicated.    ***Blood Panel PCR results on this specimen are available    approximately 3 hours after the Gram stain result.***    Gram stain, PCR, and/or culture results may not always    correspond due to difference in methodologies.    ************************************************************    This PCR assay was performed by multiplex PCR. This    Assay tests for 66 bacterial and resistance gene targets.    Please refer to the [facility] test directory    at [email or web address] for details.  Organism: Blood Culture PCR (02-02-23 @ 21:27)  Organism: Blood Culture PCR (02-02-23 @ 21:27)      -  Staphylococcus epidermidis, Methicillin resistant: Detec      Method Type: PCR            INFECTIOUS DISEASES TESTING  Procalcitonin, Serum: 2.82 (02-06-23 @ 10:43)  Procalcitonin, Serum: 49.60 (02-02-23 @ 12:05)  MRSA PCR Result.: Negative (02-01-23 @ 10:42)  Procalcitonin, Serum: 37.20 (02-01-23 @ 10:42)  Legionella Antigen, Urine: Negative (02-01-23 @ 10:10)  Procalcitonin, Serum: 14.30 (02-01-23 @ 05:55)      INFLAMMATORY MARKERS  C-Reactive Protein, Serum: 42.3 mg/L (02-06-23 @ 10:43)  C-Reactive Protein, Serum: 107.1 mg/L (02-01-23 @ 10:42)      RADIOLOGY & ADDITIONAL TESTS:  I have personally reviewed the last available Chest xray  CXR  Xray Chest 1 View- PORTABLE-Urgent:   ACC: [id number] EXAM:  XR CHEST PORTABLE URGENT 1V   ORDERED BY: TIMBO PRICE     PROCEDURE DATE:  02/11/2023          INTERPRETATION:  Clinical History / Reason for exam: Enteric tube   placement.    Comparison : Chest radiograph February 11,2023.    Technique/Positioning: Single frontal chest x-ray obtained.    Findings:    Support devices: Unchanged left IJ central venous catheter. Enteric tube   extends below diaphragm. Previously seen endotracheal tube not   appreciated.    Cardiac/mediastinum/hilum: Unchanged.    Lung parenchyma/Pleura: Unchanged bilateral opacities. No pneumothorax.    Skeleton/soft tissues: Unchanged.    Impression:    Enteric tube extends below diaphragm  Unchanged bilateral opacities.    --- End of Report ---            JAY ROBLES MD; Attending Radiologist  This document has been electronically signed. Feb 12 2023  8:16AM (02-11-23 @ 19:25)      CT      CARDIOLOGY TESTING  12 Lead ECG:   Ventricular Rate 47 BPM    Atrial Rate 47 BPM    QRS Duration 113 ms    Q-T Interval 454 ms    QTC Calculation(Bazett) 402 ms    R Axis 97 degrees    T Axis 85 degrees    Diagnosis Line Atrial fibrillation with slow ventricular response  Rightward axis  Low voltage QRS  Abnormal ECG    Confirmed by MAI RHOADES MD ([id number]) on 2/8/2023 7:18:11 AM (02-07-23 @ 07:01)  12 Lead ECG:   Ventricular Rate 56 BPM    Atrial Rate 56 BPM    QRS Duration 113 ms    Q-T Interval 429 ms    QTC Calculation(Bazett) 414 ms    R Axis 97 degrees    T Axis 74 degrees    Diagnosis Line Atrial fibrillation with slow ventricular response with premature ventricular  or aberrantly conducted complexes  Rightward axis  Abnormal ECG    Confirmed by Stacy Blackburn MD ([id number]) on 2/7/2023 10:31:22 AM (02-07-23 @ 06:41)      MEDICATIONS  chlorhexidine 2% Cloths 1 Topical daily  insulin lispro (ADMELOG) corrective regimen sliding scale  SubCutaneous every 6 hours  magnesium oxide 400 Oral daily  pantoprazole  Injectable 40 IV Push every 12 hours  polyethylene glycol 3350 17 Oral daily  senna 2 Oral at bedtime      WEIGHT  Weight (kg): 110 (01-31-23 @ 19:58)  Creatinine, Serum: 1.1 mg/dL (02-13-23 @ 04:50)      ANTIBIOTICS:      All available historical records have been reviewed

## 2023-02-13 NOTE — PROGRESS NOTE ADULT - ASSESSMENT
71 yo female kth afib, htn, solitary kidney presented from [facility] for SOB with COVID four days prior to admission     #Acute Hypoxic Respiratory Failure, extubated 2/7  #ARDS  - CT Chest No Cont (01.31.23 @ 21:17):  Mild right pleural effusion with adjacent  atelectasis. Bibasilar atelectatic changes versus scarring. Partial   obstruction of the distal right lower lobe bronchioles. Otherwise no  evidence of central endobronchial obstruction. No focal consolidation.  Right upper lobe pulmonary cyst (4-130). No pleural effusion. No  pneumothorax.  - Xray Chest 1 View-PORTABLE IMMEDIATE (Xray Chest 1 View-PORTABLE IMMEDIATE .) (02.01.23 @ 06:31):  Worsening opacifications and effusions. Support devices as described.    #Septic Shock secondary to COVID/UTI   - Blood Cx 1/31 Coag NEGATIVE staph - likely contaminant     #Proteus ESBL UTI - Urine Cx 1/31   - completed meropenem 2/12     #COVID-19 severe  #NOLA     #Solitary Kidney     Recommendations  - monitor off antiboitics  - aspiration precautions   - recall as needed    Please call or message on Microsoft Teams if with any questions.  Spectra [phone]

## 2023-02-14 ENCOUNTER — RESULT REVIEW (OUTPATIENT)
Age: 73
End: 2023-02-14

## 2023-02-14 LAB
ALBUMIN SERPL ELPH-MCNC: 3.1 G/DL — LOW (ref 3.5–5.2)
ALP SERPL-CCNC: 96 U/L — SIGNIFICANT CHANGE UP (ref 30–115)
ALT FLD-CCNC: 16 U/L — SIGNIFICANT CHANGE UP (ref 0–41)
ANION GAP SERPL CALC-SCNC: 4 MMOL/L — LOW (ref 7–14)
ANION GAP SERPL CALC-SCNC: 6 MMOL/L — LOW (ref 7–14)
AST SERPL-CCNC: 14 U/L — SIGNIFICANT CHANGE UP (ref 0–41)
BILIRUB SERPL-MCNC: 0.8 MG/DL — SIGNIFICANT CHANGE UP (ref 0.2–1.2)
BUN SERPL-MCNC: 31 MG/DL — HIGH (ref 10–20)
BUN SERPL-MCNC: 35 MG/DL — HIGH (ref 10–20)
CALCIUM SERPL-MCNC: 9.6 MG/DL — SIGNIFICANT CHANGE UP (ref 8.4–10.4)
CALCIUM SERPL-MCNC: 9.7 MG/DL — SIGNIFICANT CHANGE UP (ref 8.4–10.4)
CHLORIDE SERPL-SCNC: 106 MMOL/L — SIGNIFICANT CHANGE UP (ref 98–110)
CHLORIDE SERPL-SCNC: 111 MMOL/L — HIGH (ref 98–110)
CO2 SERPL-SCNC: 33 MMOL/L — HIGH (ref 17–32)
CO2 SERPL-SCNC: 33 MMOL/L — HIGH (ref 17–32)
CREAT SERPL-MCNC: 0.9 MG/DL — SIGNIFICANT CHANGE UP (ref 0.7–1.5)
CREAT SERPL-MCNC: 1 MG/DL — SIGNIFICANT CHANGE UP (ref 0.7–1.5)
EGFR: 61 ML/MIN/1.73M2 — SIGNIFICANT CHANGE UP
EGFR: 69 ML/MIN/1.73M2 — SIGNIFICANT CHANGE UP
GLUCOSE BLDC GLUCOMTR-MCNC: 105 MG/DL — HIGH (ref 70–99)
GLUCOSE BLDC GLUCOMTR-MCNC: 130 MG/DL — HIGH (ref 70–99)
GLUCOSE BLDC GLUCOMTR-MCNC: 139 MG/DL — HIGH (ref 70–99)
GLUCOSE BLDC GLUCOMTR-MCNC: 150 MG/DL — HIGH (ref 70–99)
GLUCOSE SERPL-MCNC: 128 MG/DL — HIGH (ref 70–99)
GLUCOSE SERPL-MCNC: 135 MG/DL — HIGH (ref 70–99)
HCT VFR BLD CALC: 27.7 % — LOW (ref 37–47)
HGB BLD-MCNC: 8.7 G/DL — LOW (ref 12–16)
MCHC RBC-ENTMCNC: 29.5 PG — SIGNIFICANT CHANGE UP (ref 27–31)
MCHC RBC-ENTMCNC: 31.4 G/DL — LOW (ref 32–37)
MCV RBC AUTO: 93.9 FL — SIGNIFICANT CHANGE UP (ref 81–99)
NRBC # BLD: 0 /100 WBCS — SIGNIFICANT CHANGE UP (ref 0–0)
PLATELET # BLD AUTO: 181 K/UL — SIGNIFICANT CHANGE UP (ref 130–400)
POTASSIUM SERPL-MCNC: 3.6 MMOL/L — SIGNIFICANT CHANGE UP (ref 3.5–5)
POTASSIUM SERPL-MCNC: 3.7 MMOL/L — SIGNIFICANT CHANGE UP (ref 3.5–5)
POTASSIUM SERPL-SCNC: 3.6 MMOL/L — SIGNIFICANT CHANGE UP (ref 3.5–5)
POTASSIUM SERPL-SCNC: 3.7 MMOL/L — SIGNIFICANT CHANGE UP (ref 3.5–5)
PROT SERPL-MCNC: 5.1 G/DL — LOW (ref 6–8)
RBC # BLD: 2.95 M/UL — LOW (ref 4.2–5.4)
RBC # FLD: 13.6 % — SIGNIFICANT CHANGE UP (ref 11.5–14.5)
SODIUM SERPL-SCNC: 143 MMOL/L — SIGNIFICANT CHANGE UP (ref 135–146)
SODIUM SERPL-SCNC: 150 MMOL/L — HIGH (ref 135–146)
WBC # BLD: 10.9 K/UL — HIGH (ref 4.8–10.8)
WBC # FLD AUTO: 10.9 K/UL — HIGH (ref 4.8–10.8)

## 2023-02-14 PROCEDURE — 58100 BIOPSY OF UTERUS LINING: CPT

## 2023-02-14 PROCEDURE — 88305 TISSUE EXAM BY PATHOLOGIST: CPT | Mod: 26

## 2023-02-14 PROCEDURE — 99233 SBSQ HOSP IP/OBS HIGH 50: CPT

## 2023-02-14 RX ORDER — SODIUM CHLORIDE 9 MG/ML
1000 INJECTION, SOLUTION INTRAVENOUS
Refills: 0 | Status: DISCONTINUED | OUTPATIENT
Start: 2023-02-14 | End: 2023-02-15

## 2023-02-14 RX ADMIN — PANTOPRAZOLE SODIUM 40 MILLIGRAM(S): 20 TABLET, DELAYED RELEASE ORAL at 05:17

## 2023-02-14 RX ADMIN — MAGNESIUM OXIDE 400 MG ORAL TABLET 400 MILLIGRAM(S): 241.3 TABLET ORAL at 15:00

## 2023-02-14 RX ADMIN — SODIUM CHLORIDE 100 MILLILITER(S): 9 INJECTION, SOLUTION INTRAVENOUS at 08:38

## 2023-02-14 RX ADMIN — PANTOPRAZOLE SODIUM 40 MILLIGRAM(S): 20 TABLET, DELAYED RELEASE ORAL at 17:52

## 2023-02-14 RX ADMIN — POLYETHYLENE GLYCOL 3350 17 GRAM(S): 17 POWDER, FOR SOLUTION ORAL at 15:00

## 2023-02-14 NOTE — PROGRESS NOTE ADULT - ASSESSMENT
PLAN    #Acute hypoxemic resp failure, with ARDS  and Possible CAP and  COVID +  extubated 2/11   now on nasal cannula saturating well ,  titrate oxygen as tolerated     #ESBL UTI  and G+C bacteremia/ COAG - STAP  sp meropenum     #NOLA  likely ATN vs prerenal improving on CKD 2 with congenital unilateral kidney   Creatinine Trend: 1.0<--, 1.0<--, 1.1<--, 1.2<--, 1.1<--, 1.2<--      #H/O A Fib  rate controlled     #Low TSH; 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     #HTN  BP: 151/63 (14 Feb 2023 12:00) (120/56 - 164/68)  elevated     #Dysphagia  spoke with speech pathology , although patient no safe today for po feeds , family would like to reeval before deciding on peg tube   currently on ng tube feeds     #hypernatremia  worsening , add 250 cc post feed free water to pre feed 300 cc     #Melena  resolved    #Vaginal bleeding  sp biopsy today     #Class2 obesity BMI 38 patient needs to see dieitian outpatient for further evaluation     # Mild tricuspid regurgitation.    #Right upper lobe pulmonary cyst     #2.4 cm left adrenal gland nodule.    #Debility ( post icu debility) max assist continue with PT     #Anemia no indication for transfusion     PROGRESS NOTE HANDOFF    Pending:  PT , reeval by speech and swallow , titrate oxygen as tolerated     Family discussion: patient verbalized understanding and agreeable to plan of care and spoke to son at bedside     Disposition: TBD ( but may need snf )

## 2023-02-14 NOTE — CHART NOTE - NSCHARTNOTEFT_GEN_A_CORE
PGY 1 Note    Pt seen at bedside. Discussed with patient and family (including daughter Stacy, son Rodrigo, and son Maurice [health care proxy] via telephone), results of TVUS. Discussed that TVUS showed thickened endometrium to 9mm, and that these findings, when combined with vaginal bleeding in menopause are risk factor for endometrial hyperplasia and cancer. Discussed options for management including endometrial biopsy now, in outpatient setting, vs. observation with ultrasound. Informed family that only definitive diagnosis can be made with tissue sample.     Family requests that endometrial biopsy be performed in hospital now. Report that on discharge pt will go to nursing home and they are concerned she will have trouble with follow up outpatient.     Radiology  US Transvaginal (02.13.23 @ 15:24)   PROCEDURE DATE:  02/13/2023    INTERPRETATION:  CLINICAL INFORMATION: Vaginal bleeding. Postmenopausal.    Comparison made with CT abdomen and pelvis 1/31/2023.    TECHNIQUE:  Transabdominal pelvic sonogram only. Color and Spectral Doppler was   performed.    FINDINGS:  Uterus: 7.3 cm x 4.1 cm x 4.8 cm.  Calcified 2 cm fibroid.  Endometrium:abnormally thickened in postmenopausal patient, 0.9 cm on   transvaginal examination.    Post right oophorectomy, per history.  Left ovary not delineated, likely atrophic.    Fluid: None.    IMPRESSION:  Endometrium: abnormally thickened in postmenopausal patient, 0.9 cm on   transvaginal examination. Considerations include endometrial hyperplasia   and neoplasia.  Calcified 2 cm fibroid.  Post right oophorectomy, per history.  Left ovary not delineated, likely atrophic.    Procedure:  R/B/A to endometrial biopsy, including pain, cramping, bleeding, infection, and perforation of uterus discussed with patient and family. All parties voiced understanding and agreed to proceed with embx at this time. Consent signed by patient daughter, with agreement of whole family (including health care proxy on telephone) and patient. Patient unable to consent for herself at this time.     Pt placed in dorsal position on bedpan in hospital bed. Speculum inserted into vagina and cervix visualized with difficulty due to patient discomfort, body habitus, and positioning. Cervix cleaned with providine and attempt made to grasp anterior lip with single tooth tenaculum. Attempt terminated due to patient discomfort and inability to properly visualize cervix.     Attempt at blind embx made, cervix felt with manual exam and endometrial pipel advanced through the cervix to the fundus. Tissue sample collected. Single pass completed. Second pass attempted but discontinued due to patient discomfort and request to terminate procedure. Procedure terminated at this time.     Tissue sample sent to pathology, will follow up results. Further recommendations to be made based on results. If pathology shows endometrial sampling inadequate, recommend outpatient GYN followup for repeat biopsy or further observation in a more ideal setting.     Dr. Morris at bedside, Dr. Dolan aware PGY 1 Note    Pt seen at bedside. Discussed with patient and family (including daughter Stacy, son Rodrigo, and son Maurice [health care proxy] via telephone), results of TVUS. Discussed that TVUS showed thickened endometrium to 9mm, and that these findings, when combined with vaginal bleeding in menopause are risk factor for endometrial hyperplasia and cancer. Discussed options for management including endometrial biopsy now, in outpatient setting, vs. observation with ultrasound. Informed family that only definitive diagnosis can be made with tissue sample.     Family requests that endometrial biopsy be performed in hospital now. Report that on discharge pt will go to nursing home and they are concerned she will have trouble with follow up outpatient.     Radiology  US Transvaginal (02.13.23 @ 15:24)   PROCEDURE DATE:  02/13/2023    INTERPRETATION:  CLINICAL INFORMATION: Vaginal bleeding. Postmenopausal.    Comparison made with CT abdomen and pelvis 1/31/2023.    TECHNIQUE:  Transabdominal pelvic sonogram only. Color and Spectral Doppler was   performed.    FINDINGS:  Uterus: 7.3 cm x 4.1 cm x 4.8 cm.  Calcified 2 cm fibroid.  Endometrium:abnormally thickened in postmenopausal patient, 0.9 cm on   transvaginal examination.    Post right oophorectomy, per history.  Left ovary not delineated, likely atrophic.    Fluid: None.    IMPRESSION:  Endometrium: abnormally thickened in postmenopausal patient, 0.9 cm on   transvaginal examination. Considerations include endometrial hyperplasia   and neoplasia.  Calcified 2 cm fibroid.  Post right oophorectomy, per history.  Left ovary not delineated, likely atrophic.    Procedure:  R/B/A to endometrial biopsy, including pain, cramping, bleeding, infection, and perforation of uterus discussed with patient and family. All parties voiced understanding and agreed to proceed with embx at this time. Consent signed by patient daughter, with agreement of whole family (including health care proxy on telephone) and patient. Patient unable to consent for herself at this time.     Pt placed in dorsal position on bedpan in hospital bed. Speculum inserted into vagina and cervix visualized with difficulty due to patient discomfort, body habitus, and positioning. Cervix cleaned with providine and attempt made to grasp anterior lip with single tooth tenaculum. Attempt terminated due to patient discomfort and inability to properly visualize cervix.     Attempt at blind embx made, cervix felt with manual exam and endometrial pipel advanced through the cervix to the fundus. Tissue sample collected. Single pass completed. Second pass attempted but discontinued due to patient discomfort and request to terminate procedure. Procedure terminated at this time.     Tissue sample sent to pathology, will follow up results. Further recommendations to be made based on results. If pathology shows endometrial sampling inadequate, recommend outpatient GYN followup for repeat biopsy or further observation in a more ideal setting.     Dr. Morris at bedside, Dr. Dolan aware    Attending note: Pt with thickened endometrium and pmb. Pt underwent uncomplicated embx. Pt to follow up with gyn as outpatient

## 2023-02-14 NOTE — SWALLOW FEES ASSESSMENT ADULT - SLP GENERAL OBSERVATIONS
Pt received in bed awake, fatigued, Dysphonic, son at b/s and present for exam
Pt received in bed w/ son at b/s awake, Head tilt preference R, difficulty holding head midline. Vocal quality slightly improved.

## 2023-02-14 NOTE — SWALLOW FEES ASSESSMENT ADULT - RECOMMENDED CONSISTENCY
NPO, agree w/ plan for long term non-oral means of nutrition and hydration at this time.
NPO, non-oral means of nutrition and hydration, allow occasional crushed ice chips w/ RN

## 2023-02-14 NOTE — SWALLOW FEES ASSESSMENT ADULT - SPECIFY REASON(S)
FEES to further assess larisa-pharyngeal swallow integrity
FEES to further assess larisa-pharyngeal swallow integrity

## 2023-02-14 NOTE — SWALLOW FEES ASSESSMENT ADULT - PHARYNGEAL PHASE COMMENTS
Severe to profound pharyngeal dysphagia w/ silent aspiration
Severe pharyngeal dysphagia for puree, mildly thick and moderately thick liquids

## 2023-02-14 NOTE — CHART NOTE - NSCHARTNOTEFT_GEN_A_CORE
Discussed possible peg tube placement with patient's daughter and son.  They both are hesitant to move forward with peg tube placement until speech and swallow re-evaluates the patient as the patient has been swallowing better.  Will continue to follow up with family and discuss their wishes for their mother.

## 2023-02-14 NOTE — SWALLOW FEES ASSESSMENT ADULT - ORAL PHASE
spillage over BOT to the glottis prior to the swallow/Uncontrolled bolus/spillover in hypopharynx
pooling down R lateral channel to the pyriform sinus w/ spillage over the R aryepiglottic fold

## 2023-02-14 NOTE — SWALLOW FEES ASSESSMENT ADULT - SLP PERTINENT HISTORY OF CURRENT PROBLEM
71 yo female with afib, htn, solitary kidney presented from [facility] for COVID pneumonia. She was also SOB but she refused to visit the hospital. She presented to the ed and was placed on bipap with no improvement so she was intubated. She tested positive for covid, and her ct scan showed mild right pleural effusion. Admitted to MICU with AHRF, septic shock. Extubated 2/7, placed on bipap overnight for desaturation, On O2 NC since ~6am.
71 yo female with afib, htn, solitary kidney presented from [facility] for COVID pneumonia. She was also SOB but she refused to visit the hospital. She presented to the ed and was placed on bipap with no improvement so she was intubated. She tested positive for covid, and her ct scan showed mild right pleural effusion. Admitted to MICU with AHRF, septic shock. Extubated 2/7, placed on bipap overnight for desaturation, On O2 NC since ~6am.

## 2023-02-14 NOTE — SWALLOW FEES ASSESSMENT ADULT - PRELIMINARY ENDOSCOPIC EXAMINATIONS
+glottic gap +intubation granulomas, still present yet smaller
b/l and posterior commissure intubation granulomas suspected w/ large glottic gap upon phonation, weak cough/Interarytenoid/post-commissure edema/Interarytenoid/Arytenoid erythema/Baseline penetration of secretions

## 2023-02-14 NOTE — SWALLOW FEES ASSESSMENT ADULT - ROSENBEK'S PENETRATION ASPIRATION SCALE
cough is weak and unproductive/(7) material passes glottis, visible subglottic residue remains despite patient’s response (aspiration)
(8) material passes glottis, visible subglottic residue remains, absent patient response (aspiration)

## 2023-02-14 NOTE — PROGRESS NOTE ADULT - SUBJECTIVE AND OBJECTIVE BOX
PATRICIA SPRAGUE  72y  [facility] [address]      Patient is a 72y old  Female who presents with a chief complaint of respiratory distress (13 Feb 2023 14:44)      INTERVAL HPI/OVERNIGHT EVENTS:    no acute events overnight     REVIEW OF SYSTEMS: ROS neg   FAMILY HISTORY:    T(C): 36.4 (02-14-23 @ 12:00), Max: 37.9 (02-13-23 @ 16:00)  HR: 78 (02-14-23 @ 12:00) (64 - 78)  BP: 151/63 (02-14-23 @ 12:00) (120/56 - 164/68)  RR: 18 (02-14-23 @ 12:00) (18 - 40)  SpO2: 99% (02-14-23 @ 12:00) (97% - 100%)  Wt(kg): --Vital Signs Last 24 Hrs  T(C): 36.4 (14 Feb 2023 12:00), Max: 37.9 (13 Feb 2023 16:00)  T(F): 97.6 (14 Feb 2023 12:00), Max: 100.2 (13 Feb 2023 16:00)  HR: 78 (14 Feb 2023 12:00) (64 - 78)  BP: 151/63 (14 Feb 2023 12:00) (120/56 - 164/68)  BP(mean): 91 (14 Feb 2023 02:00) (80 - 93)  RR: 18 (14 Feb 2023 12:00) (18 - 40)  SpO2: 99% (14 Feb 2023 12:00) (97% - 100%)    Parameters below as of 14 Feb 2023 02:00  Patient On (Oxygen Delivery Method): nasal cannula  O2 Flow (L/min): 3      PHYSICAL EXAM:  GENERAL: NAD,    HEAD:  Atraumatic, Normocephalic  EYES: EOMI, PERRLA, conjunctiva and sclera clear  ENMT: No tonsillar erythema, exudates, or enlargement; Moist mucous membranes, Good dentition, No lesions  NECK: Supple, No JVD, Normal thyroid  NERVOUS SYSTEM:  Alert & Oriented X 3  PULM: Clear to auscultation bilaterally  CARDIAC: Regular rate and rhythm; No murmurs, rubs, or gallops  GI: Soft, Nontender, Nondistended; Bowel sounds present  EXTREMITIES:  2+ Peripheral Pulses, No clubbing, cyanosis, or edema  LYMPH: No lymphadenopathy noted  SKIN: No rashes or lesions    Consultant(s) Notes Reviewed:  [x ] YES  [ ] NO  Care Discussed with Consultants/Other Providers [ x] YES  [ ] NO    LABS:                            8.7    10.90 )-----------( 181      ( 14 Feb 2023 05:31 )             27.7   02-14    150<H>  |  111<H>  |  35<H>  ----------------------------<  128<H>  3.7   |  33<H>  |  1.0    Ca    9.7      14 Feb 2023 05:31  Mg     1.8     02-13    TPro  5.1<L>  /  Alb  3.1<L>  /  TBili  0.8  /  DBili  x   /  AST  14  /  ALT  16  /  AlkPhos  96  02-14            acetaminophen     Tablet .. 650 milliGRAM(s) Oral every 6 hours PRN  chlorhexidine 2% Cloths 1 Application(s) Topical daily  dextrose 5%. 1000 milliLiter(s) IV Continuous <Continuous>  insulin lispro (ADMELOG) corrective regimen sliding scale   SubCutaneous every 6 hours  magnesium oxide 400 milliGRAM(s) Oral daily  pantoprazole  Injectable 40 milliGRAM(s) IV Push every 12 hours  polyethylene glycol 3350 17 Gram(s) Oral daily  senna 2 Tablet(s) Oral at bedtime PRN      HEALTH ISSUES - PROBLEM Dx:  COVID    Respiratory failure, acute    Sepsis    Palliative care by specialist    NOLA (acute kidney injury)            Case Discussed with House Staff   Spectra [phone]

## 2023-02-15 LAB
ALBUMIN SERPL ELPH-MCNC: 3.2 G/DL — LOW (ref 3.5–5.2)
ALP SERPL-CCNC: 106 U/L — SIGNIFICANT CHANGE UP (ref 30–115)
ALT FLD-CCNC: 18 U/L — SIGNIFICANT CHANGE UP (ref 0–41)
ANION GAP SERPL CALC-SCNC: 3 MMOL/L — LOW (ref 7–14)
ANION GAP SERPL CALC-SCNC: 7 MMOL/L — SIGNIFICANT CHANGE UP (ref 7–14)
AST SERPL-CCNC: 17 U/L — SIGNIFICANT CHANGE UP (ref 0–41)
BASOPHILS # BLD AUTO: 0.02 K/UL — SIGNIFICANT CHANGE UP (ref 0–0.2)
BASOPHILS NFR BLD AUTO: 0.2 % — SIGNIFICANT CHANGE UP (ref 0–1)
BILIRUB SERPL-MCNC: 1.1 MG/DL — SIGNIFICANT CHANGE UP (ref 0.2–1.2)
BUN SERPL-MCNC: 27 MG/DL — HIGH (ref 10–20)
BUN SERPL-MCNC: 29 MG/DL — HIGH (ref 10–20)
CALCIUM SERPL-MCNC: 9.8 MG/DL — SIGNIFICANT CHANGE UP (ref 8.4–10.4)
CALCIUM SERPL-MCNC: 9.8 MG/DL — SIGNIFICANT CHANGE UP (ref 8.4–10.4)
CHLORIDE SERPL-SCNC: 108 MMOL/L — SIGNIFICANT CHANGE UP (ref 98–110)
CHLORIDE SERPL-SCNC: 109 MMOL/L — SIGNIFICANT CHANGE UP (ref 98–110)
CO2 SERPL-SCNC: 31 MMOL/L — SIGNIFICANT CHANGE UP (ref 17–32)
CO2 SERPL-SCNC: 35 MMOL/L — HIGH (ref 17–32)
CREAT SERPL-MCNC: 0.8 MG/DL — SIGNIFICANT CHANGE UP (ref 0.7–1.5)
CREAT SERPL-MCNC: 0.9 MG/DL — SIGNIFICANT CHANGE UP (ref 0.7–1.5)
EGFR: 69 ML/MIN/1.73M2 — SIGNIFICANT CHANGE UP
EGFR: 79 ML/MIN/1.73M2 — SIGNIFICANT CHANGE UP
EOSINOPHIL # BLD AUTO: 0.27 K/UL — SIGNIFICANT CHANGE UP (ref 0–0.7)
EOSINOPHIL NFR BLD AUTO: 2.6 % — SIGNIFICANT CHANGE UP (ref 0–8)
GLUCOSE BLDC GLUCOMTR-MCNC: 114 MG/DL — HIGH (ref 70–99)
GLUCOSE BLDC GLUCOMTR-MCNC: 127 MG/DL — HIGH (ref 70–99)
GLUCOSE BLDC GLUCOMTR-MCNC: 97 MG/DL — SIGNIFICANT CHANGE UP (ref 70–99)
GLUCOSE SERPL-MCNC: 92 MG/DL — SIGNIFICANT CHANGE UP (ref 70–99)
GLUCOSE SERPL-MCNC: 96 MG/DL — SIGNIFICANT CHANGE UP (ref 70–99)
HCT VFR BLD CALC: 27.9 % — LOW (ref 37–47)
HGB BLD-MCNC: 8.5 G/DL — LOW (ref 12–16)
IMM GRANULOCYTES NFR BLD AUTO: 0.5 % — HIGH (ref 0.1–0.3)
LYMPHOCYTES # BLD AUTO: 2.33 K/UL — SIGNIFICANT CHANGE UP (ref 1.2–3.4)
LYMPHOCYTES # BLD AUTO: 22.7 % — SIGNIFICANT CHANGE UP (ref 20.5–51.1)
MAGNESIUM SERPL-MCNC: 1.4 MG/DL — LOW (ref 1.8–2.4)
MCHC RBC-ENTMCNC: 28 PG — SIGNIFICANT CHANGE UP (ref 27–31)
MCHC RBC-ENTMCNC: 30.5 G/DL — LOW (ref 32–37)
MCV RBC AUTO: 91.8 FL — SIGNIFICANT CHANGE UP (ref 81–99)
MONOCYTES # BLD AUTO: 0.72 K/UL — HIGH (ref 0.1–0.6)
MONOCYTES NFR BLD AUTO: 7 % — SIGNIFICANT CHANGE UP (ref 1.7–9.3)
NEUTROPHILS # BLD AUTO: 6.87 K/UL — HIGH (ref 1.4–6.5)
NEUTROPHILS NFR BLD AUTO: 67 % — SIGNIFICANT CHANGE UP (ref 42.2–75.2)
NRBC # BLD: 0 /100 WBCS — SIGNIFICANT CHANGE UP (ref 0–0)
PLATELET # BLD AUTO: 171 K/UL — SIGNIFICANT CHANGE UP (ref 130–400)
POTASSIUM SERPL-MCNC: 3.7 MMOL/L — SIGNIFICANT CHANGE UP (ref 3.5–5)
POTASSIUM SERPL-MCNC: 3.7 MMOL/L — SIGNIFICANT CHANGE UP (ref 3.5–5)
POTASSIUM SERPL-SCNC: 3.7 MMOL/L — SIGNIFICANT CHANGE UP (ref 3.5–5)
POTASSIUM SERPL-SCNC: 3.7 MMOL/L — SIGNIFICANT CHANGE UP (ref 3.5–5)
PROT SERPL-MCNC: 5.5 G/DL — LOW (ref 6–8)
RBC # BLD: 3.04 M/UL — LOW (ref 4.2–5.4)
RBC # FLD: 13.5 % — SIGNIFICANT CHANGE UP (ref 11.5–14.5)
SODIUM SERPL-SCNC: 146 MMOL/L — SIGNIFICANT CHANGE UP (ref 135–146)
SODIUM SERPL-SCNC: 147 MMOL/L — HIGH (ref 135–146)
SURGICAL PATHOLOGY STUDY: SIGNIFICANT CHANGE UP
WBC # BLD: 10.26 K/UL — SIGNIFICANT CHANGE UP (ref 4.8–10.8)
WBC # FLD AUTO: 10.26 K/UL — SIGNIFICANT CHANGE UP (ref 4.8–10.8)

## 2023-02-15 PROCEDURE — 99232 SBSQ HOSP IP/OBS MODERATE 35: CPT

## 2023-02-15 RX ORDER — LOSARTAN POTASSIUM 100 MG/1
25 TABLET, FILM COATED ORAL DAILY
Refills: 0 | Status: DISCONTINUED | OUTPATIENT
Start: 2023-02-15 | End: 2023-02-25

## 2023-02-15 RX ORDER — NIFEDIPINE 30 MG
60 TABLET, EXTENDED RELEASE 24 HR ORAL DAILY
Refills: 0 | Status: DISCONTINUED | OUTPATIENT
Start: 2023-02-15 | End: 2023-02-25

## 2023-02-15 RX ORDER — SODIUM CHLORIDE 9 MG/ML
1000 INJECTION, SOLUTION INTRAVENOUS
Refills: 0 | Status: DISCONTINUED | OUTPATIENT
Start: 2023-02-15 | End: 2023-02-18

## 2023-02-15 RX ORDER — ENOXAPARIN SODIUM 100 MG/ML
40 INJECTION SUBCUTANEOUS EVERY 24 HOURS
Refills: 0 | Status: DISCONTINUED | OUTPATIENT
Start: 2023-02-15 | End: 2023-02-22

## 2023-02-15 RX ORDER — NIFEDIPINE 30 MG
60 TABLET, EXTENDED RELEASE 24 HR ORAL DAILY
Refills: 0 | Status: DISCONTINUED | OUTPATIENT
Start: 2023-02-15 | End: 2023-02-15

## 2023-02-15 RX ADMIN — LOSARTAN POTASSIUM 25 MILLIGRAM(S): 100 TABLET, FILM COATED ORAL at 12:18

## 2023-02-15 RX ADMIN — CHLORHEXIDINE GLUCONATE 1 APPLICATION(S): 213 SOLUTION TOPICAL at 23:41

## 2023-02-15 RX ADMIN — MAGNESIUM OXIDE 400 MG ORAL TABLET 400 MILLIGRAM(S): 241.3 TABLET ORAL at 12:18

## 2023-02-15 RX ADMIN — SODIUM CHLORIDE 60 MILLILITER(S): 9 INJECTION, SOLUTION INTRAVENOUS at 15:04

## 2023-02-15 RX ADMIN — POLYETHYLENE GLYCOL 3350 17 GRAM(S): 17 POWDER, FOR SOLUTION ORAL at 12:19

## 2023-02-15 RX ADMIN — ENOXAPARIN SODIUM 40 MILLIGRAM(S): 100 INJECTION SUBCUTANEOUS at 15:05

## 2023-02-15 RX ADMIN — PANTOPRAZOLE SODIUM 40 MILLIGRAM(S): 20 TABLET, DELAYED RELEASE ORAL at 05:07

## 2023-02-15 RX ADMIN — PANTOPRAZOLE SODIUM 40 MILLIGRAM(S): 20 TABLET, DELAYED RELEASE ORAL at 17:39

## 2023-02-15 NOTE — PROVIDER CONTACT NOTE (OTHER) - SITUATION
pt's BP is elevated at 178/76 hr 69 pt denies pain/discomfort. pt resting comfortable in bed when VS were taken

## 2023-02-15 NOTE — PROGRESS NOTE ADULT - ASSESSMENT
71yo F w/ pmhx of afib on xarelto, HTN, solitary kidney presented from [facility] for SOB. As per the family, the patient tested positive for covid 4 days Prior to presentation and she has been feeling unwell. She was also SOB but she refused to visit the hospital. Patient was altered and her oxygen dropped to 70s in the NH, in addition to a drop in her bp. She presented to the ed and was placed on bipap with no improvement so she was intubated.     Patient was intubated on 1/31, extubated on 2/7, reintubated on 2/9, and extubated again on 2/11. Patient has been intubated in the past multiple times for surgeries (i.e for hernia repair). 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 Patient has failed multiple speech and swallow evals and currently has NG tube for feeds. ENT evaluated patient and stated that patient would need to have repeat laryngoscopy/FEES to reassess vocal cords in 1 week from 2/9. GI consulted because patient is interested in PEG. Hospital course complicated by vaginal bleeding. Patient failed speech and swallow FEES eval again on 2/14.     #dysphagia and dysphonia post extubation   - on NG tube feeds   - FEES 2/8: b/l and posterior commissure intubation granulomas suspected w/ large glottic gap upon phonation  - speech and swallow informed family that patient may possibly need one month of speech therapy before she can swallow again   - FEES repeat 2/14: severe-profound pharyngal dysphagia; failed speech and swallow eval again and would need alternative means of nutrition    Recommendations:   - Will discuss with family + patient today regarding wishes for PEG   - preop labs: CBC/CMP/Mag/coags/Type-Screen; please correct electrolytes (if K < 4 and mag < 2)   - npo after MN for tentative PEG tomorrow     *Incomplete note pending discussion with GI attending/fellow    73yo F w/ pmhx of afib on xarelto, HTN, solitary kidney presented from [facility] for SOB. As per the family, the patient tested positive for covid 4 days Prior to presentation and she has been feeling unwell. She was also SOB but she refused to visit the hospital. Patient was altered and her oxygen dropped to 70s in the NH, in addition to a drop in her bp. She presented to the ed and was placed on bipap with no improvement so she was intubated.     Patient was intubated on 1/31, extubated on 2/7, reintubated on 2/9, and extubated again on 2/11. Patient has been intubated in the past multiple times for surgeries (i.e for hernia repair). 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 Patient has failed multiple speech and swallow evals and currently has NG tube for feeds. ENT evaluated patient and stated that patient would need to have repeat laryngoscopy/FEES to reassess vocal cords in 1 week from 2/9. GI consulted because patient is interested in PEG. Hospital course complicated by vaginal bleeding. Patient failed speech and swallow FEES eval again on 2/14.     #dysphagia and dysphonia post extubation   - on NG tube feeds   - FEES 2/8: b/l and posterior commissure intubation granulomas suspected w/ large glottic gap upon phonation  - speech and swallow informed family that patient may possibly need one month of speech therapy before she can swallow again   - FEES repeat 2/14: severe-profound pharyngal dysphagia; failed speech and swallow eval again and would need alternative means of nutrition    Recommendations:   - Discussed with patient and daughter at bedside Stacy ([phone]), family agreed to PEG  - preop labs: CBC/BMP/Mag/coags/Type-Screen; repeat COVID pcr  - please correct electrolytes (if K < 4 and mag < 2)  - npo after MN for tentative PEG tomorrow     *Incomplete note pending discussion with GI attending/fellow    71yo F w/ pmhx of afib on xarelto, HTN, solitary kidney presented from [facility] for SOB. As per the family, the patient tested positive for covid 4 days Prior to presentation and she has been feeling unwell. She was also SOB but she refused to visit the hospital. Patient was altered and her oxygen dropped to 70s in the NH, in addition to a drop in her bp. She presented to the ed and was placed on bipap with no improvement so she was intubated.     Patient was intubated on 1/31, extubated on 2/7, reintubated on 2/9, and extubated again on 2/11. Patient has been intubated in the past multiple times for surgeries (i.e for hernia repair). 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 Patient has failed multiple speech and swallow evals and currently has NG tube for feeds. ENT evaluated patient and stated that patient would need to have repeat laryngoscopy/FEES to reassess vocal cords in 1 week from 2/9. GI consulted because patient is interested in PEG. Hospital course complicated by vaginal bleeding. Patient failed speech and swallow FEES eval again on 2/14.     #dysphagia and dysphonia post extubation   - on NG tube feeds   - FEES 2/8: b/l and posterior commissure intubation granulomas suspected w/ large glottic gap upon phonation  - speech and swallow informed family that patient may possibly need one month of speech therapy before she can swallow again   - FEES repeat 2/14: severe-profound pharyngal dysphagia; failed speech and swallow eval again and would need alternative means of nutrition    Recommendations:   - Discussed with patient and daughter at bedside Stacy ([phone]), family agreed to PEG  - preop labs: CBC/BMP/Mag/coags/Type-Screen; repeat COVID pcr  - please correct electrolytes (if K < 4 and mag < 2)  - npo after MN for tentative PEG tomorrow   - please obtain pulmonary risk stratification for procedure     *Incomplete note pending discussion with GI attending/fellow    71yo F w/ pmhx of afib on xarelto, HTN, solitary kidney presented from [facility] for SOB. As per the family, the patient tested positive for covid 4 days Prior to presentation and she has been feeling unwell. She was also SOB but she refused to visit the hospital. Patient was altered and her oxygen dropped to 70s in the NH, in addition to a drop in her bp. She presented to the ed and was placed on bipap with no improvement so she was intubated.     Patient was intubated on 1/31, extubated on 2/7, reintubated on 2/9, and extubated again on 2/11. Patient has been intubated in the past multiple times for surgeries (i.e for hernia repair). 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 Patient has failed multiple speech and swallow evals and currently has NG tube for feeds. ENT evaluated patient and stated that patient would need to have repeat laryngoscopy/FEES to reassess vocal cords in 1 week from 2/9. GI consulted because patient is interested in PEG. Hospital course complicated by vaginal bleeding. Patient failed speech and swallow FEES eval again on 2/14.     #dysphagia and dysphonia post extubation   - on NG tube feeds   - FEES 2/8: b/l and posterior commissure intubation granulomas suspected w/ large glottic gap upon phonation  - speech and swallow informed family that patient may possibly need one month of speech therapy before she can swallow again   - FEES repeat 2/14: severe-profound pharyngal dysphagia; failed speech and swallow eval again and would need alternative means of nutrition    Recommendations:   - Discussed with patient and daughter at bedside Stacy ([phone]), family agreed to PEG  - preop labs: CBC/BMP/Mag/coags/Type-Screen; repeat COVID pcr  - please correct electrolytes (if K < 4 and mag < 2)  - npo after MN for PEG tomorrow   - please obtain pulmonary risk stratification for procedure     *Discussed with GI attending, attestation to follow.

## 2023-02-15 NOTE — PHYSICAL THERAPY INITIAL EVALUATION ADULT - PERTINENT HX OF CURRENT PROBLEM, REHAB EVAL
71 yo female kth afib, htn, solitary kidney presented from [facility] for SOB.   As per the family, the patient tested positive for covid 4 days ago and she has been feeling unwell. She was also SOB but she refused to visit the hospital.   Today she was altered and her oxygen dropped to 70s in the NH, in addition to a drop in her bp.   She presented to the ed and was placed on bipap with no improvement so she was intubated.   She tested positive for covid, and her ct scan showed mild right pleural effusion. admitted to MICU called to evaluate

## 2023-02-15 NOTE — PROGRESS NOTE ADULT - SUBJECTIVE AND OBJECTIVE BOX
Hospital Day:  15d    Subjective: Patient is a 72y old  Female who presents with a chief complaint of respiratory distress (14 Feb 2023 15:07)      Pt seen and evaluated at bedside.   Complaints: none   Over the night Events: none  Interval events: failed FEES S/S eval again 2/14    Past Medical Hx:   HTN (hypertension)    Diabetes mellitus    Obesity    Gastroesophageal reflux disease    Active asthma    Generalized OA    Afib      Past Sx:  H/O hernia repair    History of cholecystectomy      Allergies:  No Known Allergies    Current Meds:   Standng Meds:  chlorhexidine 2% Cloths 1 Application(s) Topical daily  dextrose 5%. 1000 milliLiter(s) (100 mL/Hr) IV Continuous <Continuous>  insulin lispro (ADMELOG) corrective regimen sliding scale   SubCutaneous every 6 hours  magnesium oxide 400 milliGRAM(s) Oral daily  pantoprazole  Injectable 40 milliGRAM(s) IV Push every 12 hours  polyethylene glycol 3350 17 Gram(s) Oral daily    PRN Meds:  acetaminophen     Tablet .. 650 milliGRAM(s) Oral every 6 hours PRN Temp greater or equal to 38C (100.4F), Mild Pain (1 - 3), Moderate Pain (4 - 6)  senna 2 Tablet(s) Oral at bedtime PRN Constipation      Vital Signs:   T(F): 97 (02-15-23 @ 09:00), Max: 97.6 (02-14-23 @ 12:00)  HR: 69 (02-15-23 @ 09:00) (69 - 78)  BP: 178/76 (02-15-23 @ 09:00) (145/65 - 178/76)  RR: 18 (02-15-23 @ 09:00) (18 - 18)  SpO2: 99% (02-15-23 @ 09:00) (97% - 99%)    Physical Exam:   GENERAL: NAD, Resting in bed  HEENT: NCAT, dysphonia   CHEST/LUNG: Clear to auscultation bilaterally; No wheezing or rubs.   HEART: Regular rate and rhythm; No murmurs, rubs, or gallops  ABDOMEN: Bowel sounds present; Soft, Nontender, Nondistended.   EXTREMITIES:  No clubbing, cyanosis, or edema  NERVOUS SYSTEM:  Alert & Oriented X3    FLUID BALANCE    02-13-23 @ 07:01  -  02-14-23 @ 07:00  --------------------------------------------------------  IN: 3060 mL / OUT: 3230 mL / NET: -170 mL    02-14-23 @ 07:01  -  02-15-23 @ 07:00  --------------------------------------------------------  IN: 470 mL / OUT: 800 mL / NET: -330 mL        Labs:                         8.5    10.26 )-----------( 171      ( 15 Feb 2023 05:51 )             27.9     Neutophil% 67.0, Lymphocyte% 22.7, Monocyte% 7.0, Bands% 0.5 02-15-23 @ 05:51    15 Feb 2023 05:51    147    |  109    |  27     ----------------------------<  92     3.7     |  31     |  0.8      Ca    9.8        15 Feb 2023 05:51  Mg     1.4       15 Feb 2023 05:51    TPro  5.5    /  Alb  3.2    /  TBili  1.1    /  DBili  x      /  AST  17     /  ALT  18     /  AlkPhos  106    15 Feb 2023 05:51

## 2023-02-15 NOTE — PHYSICAL THERAPY INITIAL EVALUATION ADULT - GENERAL OBSERVATIONS, REHAB EVAL
Pt was approached for PT IE, Pt currently intubated/sedated. unable to participate. PT will follow up when appropriate
9:35-9:50; Pt encountered in bed, + NG tube, + IV, + primafit. Declining OOB at this time, reporting she is tired. Pt agreeable to bed mobility,

## 2023-02-15 NOTE — PHYSICAL THERAPY INITIAL EVALUATION ADULT - ADDITIONAL COMMENTS
Pt from [facility], requires assistance for bed mobility, transfers. Reports minimal ambulation with assistance.

## 2023-02-15 NOTE — PROGRESS NOTE ADULT - SUBJECTIVE AND OBJECTIVE BOX
Patient is a 72y old  Female who presents with a chief complaint of respiratory distress (15 Feb 2023 09:45)      HPI:  71 yo female kth afib, htn, solitary kidney presented from [facility] for SOB.   As per the family, the patient tested positive for covid 4 days ago and she has been feeling unwell. She was also SOB but she refused to visit the hospital.   Today she was altered and her oxygen dropped to 70s in the NH, in addition to a drop in her bp.   She presented to the ed and was placed on bipap with no improvement so she was intubated.   She tested positive for covid, and her ct scan showed mild right pleural effusion. admitted to MICU called to evaluate (01 Feb 2023 00:39)      PAST MEDICAL & SURGICAL HISTORY:  HTN (hypertension)      Diabetes mellitus      Obesity      Gastroesophageal reflux disease      Active asthma      Generalized OA      Afib      H/O hernia repair  2011 and revised in 2013      History of cholecystectomy          Home Medications:  alendronate 70 mg oral tablet: 1 tab(s) orally once a week (01 Feb 2023 00:45)  carvedilol 12.5 mg oral tablet: 1 tab(s) orally 2 times a day (01 Feb 2023 00:48)  cyanocobalamin 1000 mcg oral tablet, extended release: 1 tab(s) orally once a day (01 Feb 2023 00:46)  labetalol 100 mg oral tablet: 1 tab(s) orally 2 times a day (06 Nov 2020 06:59)  losartan-hydrochlorothiazide 50 mg-12.5 mg oral tablet: 1 tab(s) orally once a day (01 Feb 2023 00:46)  magnesium oxide 400 mg oral tablet: 1 tab(s) orally once a day (08 Feb 2023 13:04)  NIFEdipine 90 mg oral tablet, extended release: 1 tab(s) orally once a day (01 Feb 2023 00:46)  oxycodone-acetaminophen 5 mg-325 mg oral tablet: 1 tab(s) orally every 6 hours, As Needed - 6) for severe pain  (06 Nov 2020 06:59)  potassium chloride 10 mEq oral capsule, extended release: 2 cap(s) orally once a day (06 Nov 2020 06:59)  rivaroxaban 20 mg oral tablet: 1 tab(s) orally once a day (before a meal) (06 Nov 2020 06:59)  sodium bicarbonate 650 mg oral tablet: 1 tab(s) orally once a day (01 Feb 2023 00:47)      .  Tobacco use:   EtOH use:  Illicit drug use:    Living situation:    Allergies:  No Known Allergies      FAMILY HISTORY:      Hospital Medications:  acetaminophen     Tablet .. 650 milliGRAM(s) Oral every 6 hours PRN  chlorhexidine 2% Cloths 1 Application(s) Topical daily  dextrose 5%. 1000 milliLiter(s) IV Continuous <Continuous>  insulin lispro (ADMELOG) corrective regimen sliding scale   SubCutaneous every 6 hours  magnesium oxide 400 milliGRAM(s) Oral daily  pantoprazole  Injectable 40 milliGRAM(s) IV Push every 12 hours  polyethylene glycol 3350 17 Gram(s) Oral daily  senna 2 Tablet(s) Oral at bedtime PRN      Vital Signs Last 24 Hrs  T(C): 36.1 (15 Feb 2023 09:00), Max: 36.4 (14 Feb 2023 12:00)  T(F): 97 (15 Feb 2023 09:00), Max: 97.6 (14 Feb 2023 12:00)  HR: 69 (15 Feb 2023 09:00) (69 - 78)  BP: 178/76 (15 Feb 2023 09:00) (145/65 - 178/76)  BP(mean): --  RR: 18 (15 Feb 2023 09:00) (18 - 18)  SpO2: 99% (15 Feb 2023 09:00) (97% - 99%)    Parameters below as of 15 Feb 2023 09:00  Patient On (Oxygen Delivery Method): nasal cannula  O2 Flow (L/min): 2      I&O's Summary    14 Feb 2023 07:01  -  15 Feb 2023 07:00  --------------------------------------------------------  IN: 470 mL / OUT: 800 mL / NET: -330 mL        LABS:                        8.5    10.26 )-----------( 171      ( 15 Feb 2023 05:51 )             27.9       02-15    147<H>  |  109  |  27<H>  ----------------------------<  92  3.7   |  31  |  0.8    Ca    9.8      15 Feb 2023 05:51  Mg     1.4     02-15    TPro  5.5<L>  /  Alb  3.2<L>  /  TBili  1.1  /  DBili  x   /  AST  17  /  ALT  18  /  AlkPhos  106  02-15                PHYSICAL EXAM:  GENERAL: NAD, lying in bed comfortably, on NC, NG tube in place   HEAD:  Atraumatic, Normocephalic  EYES: conjunctiva and sclera clear  NECK: Supple, No JVD  CHEST/LUNG: poor inspiratory effort   HEART: Regular rate and rhythm; No murmurs, rubs, or gallops  ABDOMEN: Bowel sounds present; Soft, Nontender, Nondistended.   EXTREMITIES:  No clubbing, cyanosis, or edema  NERVOUS SYSTEM:  Alert & Oriented X3,           Patient is a 72y old  Female who presents with a chief complaint of respiratory distress (15 Feb 2023 09:45)      HPI:  73 yo female kth afib, htn, solitary kidney presented from [facility] for SOB.   As per the family, the patient tested positive for covid 4 days ago and she has been feeling unwell. She was also SOB but she refused to visit the hospital.   Today she was altered and her oxygen dropped to 70s in the NH, in addition to a drop in her bp.   She presented to the ed and was placed on bipap with no improvement so she was intubated.   She tested positive for covid, and her ct scan showed mild right pleural effusion. admitted to MICU called to evaluate (01 Feb 2023 00:39)      PAST MEDICAL & SURGICAL HISTORY:  HTN (hypertension)      Diabetes mellitus      Obesity      Gastroesophageal reflux disease      Active asthma      Generalized OA      Afib      H/O hernia repair  2011 and revised in 2013      History of cholecystectomy          Home Medications:  alendronate 70 mg oral tablet: 1 tab(s) orally once a week (01 Feb 2023 00:45)  carvedilol 12.5 mg oral tablet: 1 tab(s) orally 2 times a day (01 Feb 2023 00:48)  cyanocobalamin 1000 mcg oral tablet, extended release: 1 tab(s) orally once a day (01 Feb 2023 00:46)  labetalol 100 mg oral tablet: 1 tab(s) orally 2 times a day (06 Nov 2020 06:59)  losartan-hydrochlorothiazide 50 mg-12.5 mg oral tablet: 1 tab(s) orally once a day (01 Feb 2023 00:46)  magnesium oxide 400 mg oral tablet: 1 tab(s) orally once a day (08 Feb 2023 13:04)  NIFEdipine 90 mg oral tablet, extended release: 1 tab(s) orally once a day (01 Feb 2023 00:46)  oxycodone-acetaminophen 5 mg-325 mg oral tablet: 1 tab(s) orally every 6 hours, As Needed - 6) for severe pain  (06 Nov 2020 06:59)  potassium chloride 10 mEq oral capsule, extended release: 2 cap(s) orally once a day (06 Nov 2020 06:59)  rivaroxaban 20 mg oral tablet: 1 tab(s) orally once a day (before a meal) (06 Nov 2020 06:59)  sodium bicarbonate 650 mg oral tablet: 1 tab(s) orally once a day (01 Feb 2023 00:47)      .  Tobacco use:   EtOH use:  Illicit drug use:    Living situation:    Allergies:  No Known Allergies      FAMILY HISTORY:      Hospital Medications:  acetaminophen     Tablet .. 650 milliGRAM(s) Oral every 6 hours PRN  chlorhexidine 2% Cloths 1 Application(s) Topical daily  dextrose 5%. 1000 milliLiter(s) IV Continuous <Continuous>  insulin lispro (ADMELOG) corrective regimen sliding scale   SubCutaneous every 6 hours  magnesium oxide 400 milliGRAM(s) Oral daily  pantoprazole  Injectable 40 milliGRAM(s) IV Push every 12 hours  polyethylene glycol 3350 17 Gram(s) Oral daily  senna 2 Tablet(s) Oral at bedtime PRN      Vital Signs Last 24 Hrs  T(C): 36.1 (15 Feb 2023 09:00), Max: 36.4 (14 Feb 2023 12:00)  T(F): 97 (15 Feb 2023 09:00), Max: 97.6 (14 Feb 2023 12:00)  HR: 69 (15 Feb 2023 09:00) (69 - 78)  BP: 178/76 (15 Feb 2023 09:00) (145/65 - 178/76)  BP(mean): --  RR: 18 (15 Feb 2023 09:00) (18 - 18)  SpO2: 99% (15 Feb 2023 09:00) (97% - 99%)    Parameters below as of 15 Feb 2023 09:00  Patient On (Oxygen Delivery Method): nasal cannula  O2 Flow (L/min): 2      I&O's Summary    14 Feb 2023 07:01  -  15 Feb 2023 07:00  --------------------------------------------------------  IN: 470 mL / OUT: 800 mL / NET: -330 mL        LABS:                        8.5    10.26 )-----------( 171      ( 15 Feb 2023 05:51 )             27.9       02-15    147<H>  |  109  |  27<H>  ----------------------------<  92  3.7   |  31  |  0.8    Ca    9.8      15 Feb 2023 05:51  Mg     1.4     02-15    TPro  5.5<L>  /  Alb  3.2<L>  /  TBili  1.1  /  DBili  x   /  AST  17  /  ALT  18  /  AlkPhos  106  02-15                PHYSICAL EXAM:  GENERAL: NAD, old lady, chronically looks ill, lying in bed comfortably, on NC, NG tube in place   HEAD:  Atraumatic, Normocephalic  EYES: conjunctiva and sclera clear  NECK: Supple, No JVD  CHEST/LUNG: poor inspiratory effort   HEART: Regular rate and rhythm; No murmurs, rubs, or gallops  ABDOMEN: Bowel sounds present; Soft, Nontender, Nondistended.   EXTREMITIES:  No clubbing, cyanosis, or edema  NERVOUS SYSTEM:  Alert & Oriented X2-3,

## 2023-02-15 NOTE — PROGRESS NOTE ADULT - ASSESSMENT
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    #Right upper lobe pulmonary cyst     #2.4 cm left adrenal gland nodule.    #Debility ( post icu debility) max assist continue with PT     #Anemia no indication for transfusion

## 2023-02-16 ENCOUNTER — TRANSCRIPTION ENCOUNTER (OUTPATIENT)
Age: 73
End: 2023-02-16

## 2023-02-16 LAB
ALBUMIN SERPL ELPH-MCNC: 3.1 G/DL — LOW (ref 3.5–5.2)
ALBUMIN SERPL ELPH-MCNC: 3.2 G/DL — LOW (ref 3.5–5.2)
ALP SERPL-CCNC: 104 U/L — SIGNIFICANT CHANGE UP (ref 30–115)
ALP SERPL-CCNC: 109 U/L — SIGNIFICANT CHANGE UP (ref 30–115)
ALT FLD-CCNC: 16 U/L — SIGNIFICANT CHANGE UP (ref 0–41)
ALT FLD-CCNC: 16 U/L — SIGNIFICANT CHANGE UP (ref 0–41)
ANION GAP SERPL CALC-SCNC: 5 MMOL/L — LOW (ref 7–14)
ANION GAP SERPL CALC-SCNC: 9 MMOL/L — SIGNIFICANT CHANGE UP (ref 7–14)
APTT BLD: 31.3 SEC — SIGNIFICANT CHANGE UP (ref 27–39.2)
AST SERPL-CCNC: 15 U/L — SIGNIFICANT CHANGE UP (ref 0–41)
AST SERPL-CCNC: 15 U/L — SIGNIFICANT CHANGE UP (ref 0–41)
BASOPHILS # BLD AUTO: 0.02 K/UL — SIGNIFICANT CHANGE UP (ref 0–0.2)
BASOPHILS NFR BLD AUTO: 0.2 % — SIGNIFICANT CHANGE UP (ref 0–1)
BILIRUB SERPL-MCNC: 1 MG/DL — SIGNIFICANT CHANGE UP (ref 0.2–1.2)
BILIRUB SERPL-MCNC: 1 MG/DL — SIGNIFICANT CHANGE UP (ref 0.2–1.2)
BLD GP AB SCN SERPL QL: SIGNIFICANT CHANGE UP
BUN SERPL-MCNC: 22 MG/DL — HIGH (ref 10–20)
BUN SERPL-MCNC: 24 MG/DL — HIGH (ref 10–20)
CALCIUM SERPL-MCNC: 9.5 MG/DL — SIGNIFICANT CHANGE UP (ref 8.4–10.5)
CALCIUM SERPL-MCNC: 9.6 MG/DL — SIGNIFICANT CHANGE UP (ref 8.4–10.4)
CHLORIDE SERPL-SCNC: 108 MMOL/L — SIGNIFICANT CHANGE UP (ref 98–110)
CHLORIDE SERPL-SCNC: 108 MMOL/L — SIGNIFICANT CHANGE UP (ref 98–110)
CO2 SERPL-SCNC: 26 MMOL/L — SIGNIFICANT CHANGE UP (ref 17–32)
CO2 SERPL-SCNC: 30 MMOL/L — SIGNIFICANT CHANGE UP (ref 17–32)
CREAT SERPL-MCNC: 0.8 MG/DL — SIGNIFICANT CHANGE UP (ref 0.7–1.5)
CREAT SERPL-MCNC: 0.8 MG/DL — SIGNIFICANT CHANGE UP (ref 0.7–1.5)
EGFR: 79 ML/MIN/1.73M2 — SIGNIFICANT CHANGE UP
EGFR: 79 ML/MIN/1.73M2 — SIGNIFICANT CHANGE UP
EOSINOPHIL # BLD AUTO: 0.3 K/UL — SIGNIFICANT CHANGE UP (ref 0–0.7)
EOSINOPHIL NFR BLD AUTO: 3.4 % — SIGNIFICANT CHANGE UP (ref 0–8)
GLUCOSE BLDC GLUCOMTR-MCNC: 108 MG/DL — HIGH (ref 70–99)
GLUCOSE BLDC GLUCOMTR-MCNC: 110 MG/DL — HIGH (ref 70–99)
GLUCOSE BLDC GLUCOMTR-MCNC: 117 MG/DL — HIGH (ref 70–99)
GLUCOSE BLDC GLUCOMTR-MCNC: 89 MG/DL — SIGNIFICANT CHANGE UP (ref 70–99)
GLUCOSE BLDC GLUCOMTR-MCNC: 94 MG/DL — SIGNIFICANT CHANGE UP (ref 70–99)
GLUCOSE BLDC GLUCOMTR-MCNC: 97 MG/DL — SIGNIFICANT CHANGE UP (ref 70–99)
GLUCOSE SERPL-MCNC: 101 MG/DL — HIGH (ref 70–99)
GLUCOSE SERPL-MCNC: 94 MG/DL — SIGNIFICANT CHANGE UP (ref 70–99)
HCT VFR BLD CALC: 24.7 % — LOW (ref 37–47)
HCT VFR BLD CALC: 25.6 % — LOW (ref 37–47)
HGB BLD-MCNC: 7.8 G/DL — LOW (ref 12–16)
HGB BLD-MCNC: 8.1 G/DL — LOW (ref 12–16)
IMM GRANULOCYTES NFR BLD AUTO: 0.4 % — HIGH (ref 0.1–0.3)
INR BLD: 1.08 RATIO — SIGNIFICANT CHANGE UP (ref 0.65–1.3)
LYMPHOCYTES # BLD AUTO: 2.09 K/UL — SIGNIFICANT CHANGE UP (ref 1.2–3.4)
LYMPHOCYTES # BLD AUTO: 23.4 % — SIGNIFICANT CHANGE UP (ref 20.5–51.1)
MAGNESIUM SERPL-MCNC: 1.2 MG/DL — LOW (ref 1.8–2.4)
MCHC RBC-ENTMCNC: 28.6 PG — SIGNIFICANT CHANGE UP (ref 27–31)
MCHC RBC-ENTMCNC: 28.8 PG — SIGNIFICANT CHANGE UP (ref 27–31)
MCHC RBC-ENTMCNC: 31.6 G/DL — LOW (ref 32–37)
MCHC RBC-ENTMCNC: 31.6 G/DL — LOW (ref 32–37)
MCV RBC AUTO: 90.5 FL — SIGNIFICANT CHANGE UP (ref 81–99)
MCV RBC AUTO: 91.1 FL — SIGNIFICANT CHANGE UP (ref 81–99)
MONOCYTES # BLD AUTO: 0.74 K/UL — HIGH (ref 0.1–0.6)
MONOCYTES NFR BLD AUTO: 8.3 % — SIGNIFICANT CHANGE UP (ref 1.7–9.3)
NEUTROPHILS # BLD AUTO: 5.73 K/UL — SIGNIFICANT CHANGE UP (ref 1.4–6.5)
NEUTROPHILS NFR BLD AUTO: 64.3 % — SIGNIFICANT CHANGE UP (ref 42.2–75.2)
NRBC # BLD: 0 /100 WBCS — SIGNIFICANT CHANGE UP (ref 0–0)
NRBC # BLD: 0 /100 WBCS — SIGNIFICANT CHANGE UP (ref 0–0)
PLATELET # BLD AUTO: 179 K/UL — SIGNIFICANT CHANGE UP (ref 130–400)
PLATELET # BLD AUTO: 179 K/UL — SIGNIFICANT CHANGE UP (ref 130–400)
POTASSIUM SERPL-MCNC: 3.5 MMOL/L — SIGNIFICANT CHANGE UP (ref 3.5–5)
POTASSIUM SERPL-MCNC: 3.7 MMOL/L — SIGNIFICANT CHANGE UP (ref 3.5–5)
POTASSIUM SERPL-SCNC: 3.5 MMOL/L — SIGNIFICANT CHANGE UP (ref 3.5–5)
POTASSIUM SERPL-SCNC: 3.7 MMOL/L — SIGNIFICANT CHANGE UP (ref 3.5–5)
PROT SERPL-MCNC: 5.1 G/DL — LOW (ref 6–8)
PROT SERPL-MCNC: 5.2 G/DL — LOW (ref 6–8)
PROTHROM AB SERPL-ACNC: 12.4 SEC — SIGNIFICANT CHANGE UP (ref 9.95–12.87)
RBC # BLD: 2.71 M/UL — LOW (ref 4.2–5.4)
RBC # BLD: 2.83 M/UL — LOW (ref 4.2–5.4)
RBC # FLD: 13.2 % — SIGNIFICANT CHANGE UP (ref 11.5–14.5)
RBC # FLD: 13.2 % — SIGNIFICANT CHANGE UP (ref 11.5–14.5)
SARS-COV-2 RNA SPEC QL NAA+PROBE: DETECTED
SODIUM SERPL-SCNC: 143 MMOL/L — SIGNIFICANT CHANGE UP (ref 135–146)
SODIUM SERPL-SCNC: 143 MMOL/L — SIGNIFICANT CHANGE UP (ref 135–146)
WBC # BLD: 8.47 K/UL — SIGNIFICANT CHANGE UP (ref 4.8–10.8)
WBC # BLD: 8.92 K/UL — SIGNIFICANT CHANGE UP (ref 4.8–10.8)
WBC # FLD AUTO: 8.47 K/UL — SIGNIFICANT CHANGE UP (ref 4.8–10.8)
WBC # FLD AUTO: 8.92 K/UL — SIGNIFICANT CHANGE UP (ref 4.8–10.8)

## 2023-02-16 PROCEDURE — 99232 SBSQ HOSP IP/OBS MODERATE 35: CPT | Mod: GC

## 2023-02-16 PROCEDURE — 43246 EGD PLACE GASTROSTOMY TUBE: CPT

## 2023-02-16 PROCEDURE — 99232 SBSQ HOSP IP/OBS MODERATE 35: CPT

## 2023-02-16 RX ORDER — MAGNESIUM SULFATE 500 MG/ML
2 VIAL (ML) INJECTION EVERY 4 HOURS
Refills: 0 | Status: COMPLETED | OUTPATIENT
Start: 2023-02-16 | End: 2023-02-16

## 2023-02-16 RX ADMIN — LOSARTAN POTASSIUM 25 MILLIGRAM(S): 100 TABLET, FILM COATED ORAL at 05:12

## 2023-02-16 RX ADMIN — PANTOPRAZOLE SODIUM 40 MILLIGRAM(S): 20 TABLET, DELAYED RELEASE ORAL at 05:13

## 2023-02-16 RX ADMIN — PANTOPRAZOLE SODIUM 40 MILLIGRAM(S): 20 TABLET, DELAYED RELEASE ORAL at 17:34

## 2023-02-16 RX ADMIN — ENOXAPARIN SODIUM 40 MILLIGRAM(S): 100 INJECTION SUBCUTANEOUS at 14:52

## 2023-02-16 RX ADMIN — CHLORHEXIDINE GLUCONATE 1 APPLICATION(S): 213 SOLUTION TOPICAL at 23:06

## 2023-02-16 RX ADMIN — Medication 60 MILLIGRAM(S): at 05:12

## 2023-02-16 RX ADMIN — Medication 25 GRAM(S): at 17:33

## 2023-02-16 RX ADMIN — Medication 25 GRAM(S): at 15:34

## 2023-02-16 NOTE — CONSULT NOTE ADULT - ATTENDING COMMENTS
71 yo female kth afib, htn, solitary kidney presented from [facility] for SOB with COVID four days prior to admission     #Acute Hypoxic Respiratory Failure  #ARDS  - CT Chest No Cont (01.31.23 @ 21:17):  Mild right pleural effusion with adjacent  atelectasis. Bibasilar atelectatic changes versus scarring. Partial   obstruction of the distal right lower lobe bronchioles. Otherwise no  evidence of central endobronchial obstruction. No focal consolidation.  Right upper lobe pulmonary cyst (4-130). No pleural effusion. No  pneumothorax.  - Xray Chest 1 View-PORTABLE IMMEDIATE (Xray Chest 1 View-PORTABLE IMMEDIATE .) (02.01.23 @ 06:31):  Worsening opacifications and effusions. Support devices as described.    #Septic Shock     #COVID-19 severe  #NOLA     #Solitary Kidney     Recommendations  - continue ceftriaxone 1g daily - no clear Pseudomonas risk factors   - continue levofloxacin 500 mg q 48 hours   - follow-up blood and urine cx   - check CRP, Ferritin   - check urine legionella and urine strep   - unclear if in cytokine release phase -- if blood cx/urine cx are negative, will consider Toci  - continue dex 6 mg daily     Please call or message on Microsoft Teams if with any questions.  Spectra [phone]
Ms Spencer was seen & examined this afternoon post-procedurally.  Pulmonary was reengaged to evaluate for pulmonary perioperative risk stratification, however the patient was taken for her procedure prior to being seen.  The patient was noted to be intermediate risk due to ongoing severe debility and acute oxygen requirement, and the patient fortunately tolerated the procedure without pulmonary complication at the time of my assessment.
Vocal cord injury. In need for alternative (to PO). Hx of Afib off Xarelto. May schedule this week.
Agree with above note as d/w Chief resident

## 2023-02-16 NOTE — CONSULT NOTE ADULT - CONSULT REQUESTED DATE/TIME
10-Feb-2023 12:35
01-Feb-2023 14:55
06-Feb-2023 10:07
07-Feb-2023 13:31
09-Feb-2023 19:22
01-Feb-2023 16:51
13-Feb-2023 14:44
16-Feb-2023
01-Feb-2023 17:44

## 2023-02-16 NOTE — PROGRESS NOTE ADULT - ASSESSMENT
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 Pulm clearance pending     #hypernatremia  worsening , add 250 cc post feed free water to pre feed 300 cc     #Melena  resolved    #Vaginal bleeding  sp biopsy today     #Class2 obesity BMI 38 patient needs to see dieitian outpatient for further evaluation     # Mild tricuspid regurgitation.    #Right upper lobe pulmonary cyst     #2.4 cm left adrenal gland nodule.    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 Pulm clearance pending     #hypernatremia  resolved    #Melena  resolved    #Vaginal bleeding  sp biopsy today     #Class2 obesity BMI 38 patient needs to see dieitian outpatient for further evaluation     # Mild tricuspid regurgitation.    #Right upper lobe pulmonary cyst     #2.4 cm left adrenal gland nodule.    #Debility ( post icu debility) max assist continue with PT     #Anemia no indication for transfusion

## 2023-02-16 NOTE — CONSULT NOTE ADULT - PROVIDER SPECIALTY LIST ADULT
Nutrition Support
Infectious Disease
Pulmonology
ENT
Palliative Care
Wound Care
Nephrology
GYN
Gastroenterology

## 2023-02-16 NOTE — CONSULT NOTE ADULT - REASON FOR ADMISSION
respiratory distress

## 2023-02-16 NOTE — PROGRESS NOTE ADULT - SUBJECTIVE AND OBJECTIVE BOX
Patient is a 72y old  Female who presents with a chief complaint of respiratory distress (16 Feb 2023 09:06)      HPI:  73 yo female kth afib, htn, solitary kidney presented from [facility] for SOB.   As per the family, the patient tested positive for covid 4 days ago and she has been feeling unwell. She was also SOB but she refused to visit the hospital.   Today she was altered and her oxygen dropped to 70s in the NH, in addition to a drop in her bp.   She presented to the ed and was placed on bipap with no improvement so she was intubated.   She tested positive for covid, and her ct scan showed mild right pleural effusion. admitted to MICU called to evaluate (01 Feb 2023 00:39)      PAST MEDICAL & SURGICAL HISTORY:  HTN (hypertension)      Diabetes mellitus      Obesity      Gastroesophageal reflux disease      Active asthma      Generalized OA      Afib      H/O hernia repair  2011 and revised in 2013      History of cholecystectomy          Home Medications:  alendronate 70 mg oral tablet: 1 tab(s) orally once a week (01 Feb 2023 00:45)  carvedilol 12.5 mg oral tablet: 1 tab(s) orally 2 times a day (01 Feb 2023 00:48)  cyanocobalamin 1000 mcg oral tablet, extended release: 1 tab(s) orally once a day (01 Feb 2023 00:46)  labetalol 100 mg oral tablet: 1 tab(s) orally 2 times a day (06 Nov 2020 06:59)  losartan-hydrochlorothiazide 50 mg-12.5 mg oral tablet: 1 tab(s) orally once a day (01 Feb 2023 00:46)  magnesium oxide 400 mg oral tablet: 1 tab(s) orally once a day (08 Feb 2023 13:04)  NIFEdipine 90 mg oral tablet, extended release: 1 tab(s) orally once a day (01 Feb 2023 00:46)  oxycodone-acetaminophen 5 mg-325 mg oral tablet: 1 tab(s) orally every 6 hours, As Needed - 6) for severe pain  (06 Nov 2020 06:59)  potassium chloride 10 mEq oral capsule, extended release: 2 cap(s) orally once a day (06 Nov 2020 06:59)  rivaroxaban 20 mg oral tablet: 1 tab(s) orally once a day (before a meal) (06 Nov 2020 06:59)  sodium bicarbonate 650 mg oral tablet: 1 tab(s) orally once a day (01 Feb 2023 00:47)      .  Tobacco use:   EtOH use:  Illicit drug use:    Living situation:    Allergies:  No Known Allergies      FAMILY HISTORY:      Hospital Medications:  acetaminophen     Tablet .. 650 milliGRAM(s) Oral every 6 hours PRN  chlorhexidine 2% Cloths 1 Application(s) Topical daily  dextrose 5%. 1000 milliLiter(s) IV Continuous <Continuous>  enoxaparin Injectable 40 milliGRAM(s) SubCutaneous every 24 hours  insulin lispro (ADMELOG) corrective regimen sliding scale   SubCutaneous every 6 hours  losartan 25 milliGRAM(s) Oral daily  magnesium oxide 400 milliGRAM(s) Oral daily  NIFEdipine XL 60 milliGRAM(s) Oral daily  pantoprazole  Injectable 40 milliGRAM(s) IV Push every 12 hours  polyethylene glycol 3350 17 Gram(s) Oral daily  senna 2 Tablet(s) Oral at bedtime PRN      Vital Signs Last 24 Hrs  T(C): 36.2 (16 Feb 2023 09:09), Max: 37.2 (15 Feb 2023 12:00)  T(F): 97.1 (16 Feb 2023 09:09), Max: 99 (15 Feb 2023 12:00)  HR: 60 (16 Feb 2023 09:09) (58 - 80)  BP: 144/55 (16 Feb 2023 09:09) (144/55 - 172/55)  BP(mean): --  RR: 18 (16 Feb 2023 09:09) (18 - 18)  SpO2: 99% (16 Feb 2023 09:09) (95% - 100%)    Parameters below as of 16 Feb 2023 09:09  Patient On (Oxygen Delivery Method): nasal cannula        I&O's Summary    15 Feb 2023 07:01  -  16 Feb 2023 07:00  --------------------------------------------------------  IN: 1260 mL / OUT: 1300 mL / NET: -40 mL        LABS:                        8.1    8.47  )-----------( 179      ( 16 Feb 2023 05:50 )             25.6       02-16    143  |  108  |  22<H>  ----------------------------<  94  3.7   |  26  |  0.8    Ca    9.5      16 Feb 2023 05:50  Mg     1.2     02-15    TPro  5.2<L>  /  Alb  3.2<L>  /  TBili  1.0  /  DBili  x   /  AST  15  /  ALT  16  /  AlkPhos  109  02-16                PHYSICAL EXAM:  GENERAL: NAD, old lady, chronically looks ill, lying in bed comfortably, on NC, NG tube in place   HEAD:  Atraumatic, Normocephalic  EYES: conjunctiva and sclera clear  NECK: Supple, No JVD  CHEST/LUNG: poor inspiratory effort   HEART: Regular rate and rhythm; No murmurs, rubs, or gallops  ABDOMEN: Bowel sounds present; Soft, Nontender, Nondistended.   EXTREMITIES:  No clubbing, cyanosis, or edema  NERVOUS SYSTEM:  Alert & Oriented X2-3,

## 2023-02-16 NOTE — CONSULT NOTE ADULT - CONSULT REASON
COVID
Pressure Injury assessment and treatment recommendation
Vaginal bleeding
NOLA
covid resp failure
dysphagia, eval for PEG
GOC
medicine
perioperative risk strat

## 2023-02-16 NOTE — CONSULT NOTE ADULT - SUBJECTIVE AND OBJECTIVE BOX
Patient is a 72y old  Female who presents with a chief complaint of respiratory distress (15 Feb 2023 09:52)      HPI:  73 yo female kth afib, htn, solitary kidney presented from [facility] for SOB.   As per the family, the patient tested positive for covid 4 days ago and she has been feeling unwell. She was also SOB but she refused to visit the hospital.   Today she was altered and her oxygen dropped to 70s in the NH, in addition to a drop in her bp.   She presented to the ed and was placed on bipap with no improvement so she was intubated.   She tested positive for covid, and her ct scan showed mild right pleural effusion. admitted to MICU called to evaluate (01 Feb 2023 00:39)      PAST MEDICAL & SURGICAL HISTORY:  HTN (hypertension)      Diabetes mellitus      Obesity      Gastroesophageal reflux disease      Active asthma      Generalized OA      Afib      H/O hernia repair  2011 and revised in 2013      History of cholecystectomy          SOCIAL HX:   Smoking                         ETOH                            Other    FAMILY HISTORY:  .  No cardiovascular or pulmonary family history     REVIEW OF SYSTEMS:    All ROS are negative exept per HPI       Allergies    No Known Allergies    Intolerances          PHYSICAL EXAM  Vital Signs Last 24 Hrs  T(C): 36.3 (16 Feb 2023 04:00), Max: 37.2 (15 Feb 2023 12:00)  T(F): 97.4 (16 Feb 2023 04:00), Max: 99 (15 Feb 2023 12:00)  HR: 58 (16 Feb 2023 04:00) (58 - 80)  BP: 166/70 (16 Feb 2023 04:00) (147/86 - 172/55)  BP(mean): --  RR: 18 (16 Feb 2023 04:00) (18 - 18)  SpO2: 100% (16 Feb 2023 04:00) (95% - 100%)    Parameters below as of 16 Feb 2023 04:00  Patient On (Oxygen Delivery Method): nasal cannula  O2 Flow (L/min): 2      CONSTITUTIONAL:  Well nourished.  NAD    ENT:   Airway patent,   No thrush    EYES:   Clear bilaterally,   pupils equal,   round and reactive to light.    CARDIAC:   Normal rate,   regular rhythm.    no edema      RESPIRATORY:   No wheezing   Normal chest expansion  Not tachypneic,  No use of accessory muscles    GASTROINTESTINAL:  Abdomen soft, non-tender,   No guarding,   Positive BS    MUSCULOSKELETAL:   Range of motion is not limited,  No clubbing, cyanosis    NEUROLOGICAL:   Alert and oriented   No motor deficits.    SKIN:   Skin normal color for race,   No evidence of rash.      HEME LYMPH:   No cervical  lymphadenopathy.  no inguinal lymphadenopathy          LABS:                          8.1    8.47  )-----------( 179      ( 16 Feb 2023 05:50 )             25.6                                               02-16    143  |  108  |  22<H>  ----------------------------<  94  3.7   |  26  |  0.8    Ca    9.5      16 Feb 2023 05:50  Mg     1.2     02-15    TPro  5.2<L>  /  Alb  3.2<L>  /  TBili  1.0  /  DBili  x   /  AST  15  /  ALT  16  /  AlkPhos  109  02-16      PT/INR - ( 15 Feb 2023 23:30 )   PT: 12.40 sec;   INR: 1.08 ratio         PTT - ( 15 Feb 2023 23:30 )  PTT:31.3 sec                                                                                     LIVER FUNCTIONS - ( 16 Feb 2023 05:50 )  Alb: 3.2 g/dL / Pro: 5.2 g/dL / ALK PHOS: 109 U/L / ALT: 16 U/L / AST: 15 U/L / GGT: x                                                                                                MEDICATIONS  (STANDING):  chlorhexidine 2% Cloths 1 Application(s) Topical daily  dextrose 5%. 1000 milliLiter(s) (60 mL/Hr) IV Continuous <Continuous>  enoxaparin Injectable 40 milliGRAM(s) SubCutaneous every 24 hours  insulin lispro (ADMELOG) corrective regimen sliding scale   SubCutaneous every 6 hours  losartan 25 milliGRAM(s) Oral daily  magnesium oxide 400 milliGRAM(s) Oral daily  NIFEdipine XL 60 milliGRAM(s) Oral daily  pantoprazole  Injectable 40 milliGRAM(s) IV Push every 12 hours  polyethylene glycol 3350 17 Gram(s) Oral daily    MEDICATIONS  (PRN):  acetaminophen     Tablet .. 650 milliGRAM(s) Oral every 6 hours PRN Temp greater or equal to 38C (100.4F), Mild Pain (1 - 3), Moderate Pain (4 - 6)  senna 2 Tablet(s) Oral at bedtime PRN Constipation      X-Rays reviewed:    CXR interpreted by me: Patient is a 72y old  Female who presents with a chief complaint of respiratory distress (15 Feb 2023 09:52)      HPI:  71 yo female kth afib, htn, solitary kidney presented from [facility] for SOB.   As per the family, the patient tested positive for covid 4 days ago and she has been feeling unwell. She was also SOB but she refused to visit the hospital.   Today she was altered and her oxygen dropped to 70s in the NH, in addition to a drop in her bp.   She presented to the ed and was placed on bipap with no improvement so she was intubated.   She tested positive for covid, and her ct scan showed mild right pleural effusion. admitted to MICU called to evaluate (01 Feb 2023 00:39)      PAST MEDICAL & SURGICAL HISTORY:  HTN (hypertension)      Diabetes mellitus      Obesity      Gastroesophageal reflux disease      Active asthma      Generalized OA      Afib      H/O hernia repair  2011 and revised in 2013      History of cholecystectomy          SOCIAL HX:   not a smoker  Other    FAMILY HISTORY:  .  No cardiovascular or pulmonary family history     REVIEW OF SYSTEMS:    All ROS are negative exept per HPI       Allergies    No Known Allergies    Intolerances          PHYSICAL EXAM  Vital Signs Last 24 Hrs  T(C): 36.3 (16 Feb 2023 04:00), Max: 37.2 (15 Feb 2023 12:00)  T(F): 97.4 (16 Feb 2023 04:00), Max: 99 (15 Feb 2023 12:00)  HR: 58 (16 Feb 2023 04:00) (58 - 80)  BP: 166/70 (16 Feb 2023 04:00) (147/86 - 172/55)  BP(mean): --  RR: 18 (16 Feb 2023 04:00) (18 - 18)  SpO2: 100% (16 Feb 2023 04:00) (95% - 100%)    Parameters below as of 16 Feb 2023 04:00  Patient On (Oxygen Delivery Method): nasal cannula  O2 Flow (L/min): 2      CONSTITUTIONAL:  NAD, ill- appearing, lying comfortably in bed on 2L NC,     ENT:   Airway patent,   No thrush    EYES:   Clear bilaterally,   pupils equal,   round and reactive to light.    CARDIAC:   Normal rate,   regular rhythm.    no edema      RESPIRATORY:   No wheezing   decrease chest expansion  Not tachypneic,  No use of accessory muscles    GASTROINTESTINAL:  Abdomen soft, non-tender,   No guarding,   Positive BS    MUSCULOSKELETAL:   Range of motion is not limited,  No clubbing, cyanosis    NEUROLOGICAL:   Alert and oriented x 2-3   No motor deficits.    SKIN:   Skin normal color for race,   No evidence of rash.      HEME LYMPH:   No cervical  lymphadenopathy.  no inguinal lymphadenopathy          LABS:                          8.1    8.47  )-----------( 179      ( 16 Feb 2023 05:50 )             25.6                                               02-16    143  |  108  |  22<H>  ----------------------------<  94  3.7   |  26  |  0.8    Ca    9.5      16 Feb 2023 05:50  Mg     1.2     02-15    TPro  5.2<L>  /  Alb  3.2<L>  /  TBili  1.0  /  DBili  x   /  AST  15  /  ALT  16  /  AlkPhos  109  02-16      PT/INR - ( 15 Feb 2023 23:30 )   PT: 12.40 sec;   INR: 1.08 ratio         PTT - ( 15 Feb 2023 23:30 )  PTT:31.3 sec                                                                                     LIVER FUNCTIONS - ( 16 Feb 2023 05:50 )  Alb: 3.2 g/dL / Pro: 5.2 g/dL / ALK PHOS: 109 U/L / ALT: 16 U/L / AST: 15 U/L / GGT: x                                                                                                MEDICATIONS  (STANDING):  chlorhexidine 2% Cloths 1 Application(s) Topical daily  dextrose 5%. 1000 milliLiter(s) (60 mL/Hr) IV Continuous <Continuous>  enoxaparin Injectable 40 milliGRAM(s) SubCutaneous every 24 hours  insulin lispro (ADMELOG) corrective regimen sliding scale   SubCutaneous every 6 hours  losartan 25 milliGRAM(s) Oral daily  magnesium oxide 400 milliGRAM(s) Oral daily  NIFEdipine XL 60 milliGRAM(s) Oral daily  pantoprazole  Injectable 40 milliGRAM(s) IV Push every 12 hours  polyethylene glycol 3350 17 Gram(s) Oral daily    MEDICATIONS  (PRN):  acetaminophen     Tablet .. 650 milliGRAM(s) Oral every 6 hours PRN Temp greater or equal to 38C (100.4F), Mild Pain (1 - 3), Moderate Pain (4 - 6)  senna 2 Tablet(s) Oral at bedtime PRN Constipation      X-Rays reviewed:    CXR interpreted by me:

## 2023-02-16 NOTE — CHART NOTE - NSCHARTNOTEFT_GEN_A_CORE
PGY 1 note    Discussed with patient's daughter and son results of endometrial biopsy. Report showed benign endometrium. Reassured pt family that malignancy is unlikely in this scenario. No further workup required at this time. Recommended outpatient follow up in the future with GYN to manage future care. Information for [facility] given to family. All questions answered.      Surgical Pathology Report:   ACCESSION No:  [id number]  Patient:   PATRICIA SPRAGUE    Accession:                             [id number]    Collected Date/Time:                   2/14/2023 12:19 EST  Received Date/Time:                    2/14/2023 13:34 EST    Surgical Pathology Report - Auth (Verified)    Specimen(s) Submitted  Endometrial biopsy    Final Diagnosis  Endometrial biopsy:  -Strips of benign inactive endometrial epithelium, no hyperplasia or  atypia seen.  -Mainly mucus material and inflammatory cells present.    Verified by: Fredy Menjivar M.D.  (Electronic Signature)  Reported on: 02/15/23 14:39 EST, [facility],  [address] [place] [address]  Phone: [phone]   Fax: [phone]    Dr. Morris aware, Dr. Pruett to be aware

## 2023-02-16 NOTE — CHART NOTE - NSCHARTNOTEFT_GEN_A_CORE
S/P EGD with PEG placement:    Grade D esophagitis compatible with erosive esophagitis.  Erythema in the stomach compatible with non-erosive gastritis.  Hiatal Hernia.  Normal mucosa in the whole examined duodenum.    recommendations:  Can use PEG for medications in 4 hours    Can use PEG for medications and feeding in AM after evaluation by GI     Flush before and after each use    Keep abdominal on all the times   PPI BID for 8 weeks    Repeat EGD in 8 weeks

## 2023-02-17 ENCOUNTER — TRANSCRIPTION ENCOUNTER (OUTPATIENT)
Age: 73
End: 2023-02-17

## 2023-02-17 LAB
ALBUMIN SERPL ELPH-MCNC: 3.2 G/DL — LOW (ref 3.5–5.2)
ALP SERPL-CCNC: 136 U/L — HIGH (ref 30–115)
ALT FLD-CCNC: 18 U/L — SIGNIFICANT CHANGE UP (ref 0–41)
ANION GAP SERPL CALC-SCNC: 9 MMOL/L — SIGNIFICANT CHANGE UP (ref 7–14)
AST SERPL-CCNC: 18 U/L — SIGNIFICANT CHANGE UP (ref 0–41)
BILIRUB SERPL-MCNC: 1.2 MG/DL — SIGNIFICANT CHANGE UP (ref 0.2–1.2)
BUN SERPL-MCNC: 17 MG/DL — SIGNIFICANT CHANGE UP (ref 10–20)
CALCIUM SERPL-MCNC: 10.3 MG/DL — SIGNIFICANT CHANGE UP (ref 8.4–10.5)
CHLORIDE SERPL-SCNC: 108 MMOL/L — SIGNIFICANT CHANGE UP (ref 98–110)
CO2 SERPL-SCNC: 26 MMOL/L — SIGNIFICANT CHANGE UP (ref 17–32)
CREAT SERPL-MCNC: 0.7 MG/DL — SIGNIFICANT CHANGE UP (ref 0.7–1.5)
EGFR: 93 ML/MIN/1.73M2 — SIGNIFICANT CHANGE UP
GLUCOSE BLDC GLUCOMTR-MCNC: 100 MG/DL — HIGH (ref 70–99)
GLUCOSE BLDC GLUCOMTR-MCNC: 102 MG/DL — HIGH (ref 70–99)
GLUCOSE BLDC GLUCOMTR-MCNC: 137 MG/DL — HIGH (ref 70–99)
GLUCOSE BLDC GLUCOMTR-MCNC: 99 MG/DL — SIGNIFICANT CHANGE UP (ref 70–99)
GLUCOSE SERPL-MCNC: 91 MG/DL — SIGNIFICANT CHANGE UP (ref 70–99)
HCT VFR BLD CALC: 27.6 % — LOW (ref 37–47)
HGB BLD-MCNC: 8.7 G/DL — LOW (ref 12–16)
MCHC RBC-ENTMCNC: 28.3 PG — SIGNIFICANT CHANGE UP (ref 27–31)
MCHC RBC-ENTMCNC: 31.5 G/DL — LOW (ref 32–37)
MCV RBC AUTO: 89.9 FL — SIGNIFICANT CHANGE UP (ref 81–99)
NRBC # BLD: 0 /100 WBCS — SIGNIFICANT CHANGE UP (ref 0–0)
PLATELET # BLD AUTO: 103 K/UL — LOW (ref 130–400)
POTASSIUM SERPL-MCNC: 3.3 MMOL/L — LOW (ref 3.5–5)
POTASSIUM SERPL-SCNC: 3.3 MMOL/L — LOW (ref 3.5–5)
PROT SERPL-MCNC: 5.6 G/DL — LOW (ref 6–8)
RBC # BLD: 3.07 M/UL — LOW (ref 4.2–5.4)
RBC # FLD: 13.2 % — SIGNIFICANT CHANGE UP (ref 11.5–14.5)
SODIUM SERPL-SCNC: 143 MMOL/L — SIGNIFICANT CHANGE UP (ref 135–146)
WBC # BLD: 8.37 K/UL — SIGNIFICANT CHANGE UP (ref 4.8–10.8)
WBC # FLD AUTO: 8.37 K/UL — SIGNIFICANT CHANGE UP (ref 4.8–10.8)

## 2023-02-17 PROCEDURE — 99232 SBSQ HOSP IP/OBS MODERATE 35: CPT

## 2023-02-17 RX ORDER — AMLODIPINE BESYLATE 2.5 MG/1
10 TABLET ORAL ONCE
Refills: 0 | Status: COMPLETED | OUTPATIENT
Start: 2023-02-17 | End: 2023-02-17

## 2023-02-17 RX ADMIN — CHLORHEXIDINE GLUCONATE 1 APPLICATION(S): 213 SOLUTION TOPICAL at 23:20

## 2023-02-17 RX ADMIN — SODIUM CHLORIDE 60 MILLILITER(S): 9 INJECTION, SOLUTION INTRAVENOUS at 08:04

## 2023-02-17 RX ADMIN — ENOXAPARIN SODIUM 40 MILLIGRAM(S): 100 INJECTION SUBCUTANEOUS at 16:43

## 2023-02-17 RX ADMIN — AMLODIPINE BESYLATE 10 MILLIGRAM(S): 2.5 TABLET ORAL at 06:20

## 2023-02-17 RX ADMIN — PANTOPRAZOLE SODIUM 40 MILLIGRAM(S): 20 TABLET, DELAYED RELEASE ORAL at 05:47

## 2023-02-17 RX ADMIN — SODIUM CHLORIDE 60 MILLILITER(S): 9 INJECTION, SOLUTION INTRAVENOUS at 16:43

## 2023-02-17 RX ADMIN — LOSARTAN POTASSIUM 25 MILLIGRAM(S): 100 TABLET, FILM COATED ORAL at 05:48

## 2023-02-17 RX ADMIN — PANTOPRAZOLE SODIUM 40 MILLIGRAM(S): 20 TABLET, DELAYED RELEASE ORAL at 18:31

## 2023-02-17 NOTE — PROGRESS NOTE ADULT - ASSESSMENT
72 y.o F  F with  ARDS, respiratory failure, COVID+, VC paresis and granulomas. Pt. seen and examined at bedside during ENT rounds, extubated.   FEES 2/14/23 by SLP findings severe to profound pharyngeal dysphagia w /silent aspiration. + glottic gap, + intubation granulomas- still present yet smaller. Recommended NPO w/ plan for long term non-oral means of nutrition and hydration  s/p PEG placement by GI 2/16/23.  FEES video reviewed with Dr. Wood.         Plan:  No acute ENT intervention needed at this time   Cont. PPI  F/U SLP recommendations  Pt. will need f/U with ENT as an outpatient when medically stable.     Case d/w Dr. Wood

## 2023-02-17 NOTE — DISCHARGE NOTE PROVIDER - ATTENDING DISCHARGE PHYSICAL EXAMINATION:
PHYSICAL EXAM:  GEN: No acute distress  LUNGS: Normal respiratory effort.  on RA  HEART: S1/S2 present. RRR. no murmurs  ABD: Soft, non-tender, non-distended  EXT: no cyanosis or edema  NEURO: no focal deficits    Vital Signs Last 24 Hrs  T(C): 36 (24 Feb 2023 10:45), Max: 36.2 (23 Feb 2023 16:00)  T(F): 96.8 (24 Feb 2023 10:45), Max: 97.1 (23 Feb 2023 16:00)  HR: 71 (24 Feb 2023 10:45) (71 - 82)  BP: 143/66 (24 Feb 2023 10:45) (141/81 - 143/66)  BP(mean): --  RR: 18 (24 Feb 2023 10:45) (18 - 18)  SpO2: 99% (24 Feb 2023 10:45) (98% - 99%)         PHYSICAL EXAM:  GEN: No acute distress  LUNGS: Normal respiratory effort.  on RA  HEART: S1/S2 present. RRR. no murmurs  ABD: Soft, non-tender, non-distended  EXT: no cyanosis or edema  NEURO: no focal deficits    Vital Signs Last 24 Hrs  T(C): 36.6 (25 Feb 2023 09:39), Max: 36.6 (25 Feb 2023 09:39)  T(F): 97.8 (25 Feb 2023 09:39), Max: 97.8 (25 Feb 2023 09:39)  HR: 57 (25 Feb 2023 09:39) (57 - 73)  BP: 91/50 (25 Feb 2023 09:39) (91/50 - 116/69)  BP(mean): --  RR: 18 (25 Feb 2023 09:39) (18 - 18)  SpO2: 98% (25 Feb 2023 09:39) (98% - 99%)

## 2023-02-17 NOTE — DISCHARGE NOTE PROVIDER - TIME SPENT: (MINUTES SPENT ON THE DISCHARGE SERVICE)
Called mother and infomed her of   MD results below. Mother verbalized understanding and agrees with the plan.    35

## 2023-02-17 NOTE — PROGRESS NOTE ADULT - ASSESSMENT
71yo F w/ pmhx of afib on xarelto, HTN, solitary kidney presented from [facility] for SOB.   Patient was intubated on 1/31, extubated on 2/7, reintubated on 2/9, and extubated again on 2/11. Patient has been intubated in the past multiple times for surgeries (i.e for hernia repair). 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 Patient has failed multiple speech and swallow evals and currently has NG tube for feeds. ENT evaluated patient and stated that patient would need to have repeat laryngoscopy/FEES to reassess vocal cords in 1 week from 2/9. GI consulted because patient is interested in PEG. Hospital course complicated by vaginal bleeding. Patient failed speech and swallow FEES eval again on 2/14.     #dysphagia and dysphonia post extubation s/p PEG placement   - FEES 2/8: b/l and posterior commissure intubation granulomas suspected w/ large glottic gap upon phonation  - speech and swallow informed family that patient may possibly need one month of speech therapy before she can swallow again   - FEES repeat 2/14: severe-profound pharyngal dysphagia; failed speech and swallow eval again and would need alternative means of nutrition  - s/p EGD w PEG placement yesterday     Recommendations:   G-tube site was examined today.  Can use G-tube for medications and feeding  Flush before and after each use  Keep abdominal binder on all the times   skin care per RN   call as needed     # Grade D esophagitis in EGD   PLAN  PPI BID for 8 weeks  Repeat EGD in 8 weeks.  Follow up visit in 2-3 weeks     - Follow up with our [facility] located at [address] [place] [address]. Phone Number: [phone]      *Discussed with the pts daughter (Stacy)    73yo F w/ pmhx of afib on xarelto, HTN, solitary kidney presented from [facility] for SOB.   Patient was intubated on 1/31, extubated on 2/7, reintubated on 2/9, and extubated again on 2/11. Patient has been intubated in the past multiple times for surgeries (i.e for hernia repair). 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 Patient has failed multiple speech and swallow evals and currently has NG tube for feeds. ENT evaluated patient and stated that patient would need to have repeat laryngoscopy/FEES to reassess vocal cords in 1 week from 2/9. GI consulted because patient is interested in PEG. Hospital course complicated by vaginal bleeding. Patient failed speech and swallow FEES eval again on 2/14.     #dysphagia and dysphonia post extubation s/p PEG placement   - FEES 2/8: b/l and posterior commissure intubation granulomas suspected w/ large glottic gap upon phonation  - speech and swallow informed family that patient may possibly need one month of speech therapy before she can swallow again   - FEES repeat 2/14: severe-profound pharyngal dysphagia; failed speech and swallow eval again and would need alternative means of nutrition  - s/p EGD w PEG placement yesterday     Recommendations:   G-tube site was examined today.  Can use G-tube for medications and feeding  Flush before and after each use  Keep abdominal binder on all the times   skin care per RN   call as needed     # Grade D esophagitis in EGD   PLAN  PPI BID for 8 weeks  Repeat EGD in 8 weeks.  Follow up visit in 2-3 weeks     - Follow up with our [facility] located at [address] [place] [address]. Phone Number: [phone]      *Discussed with the pts daughter (Stacy) and the hospitalist attending

## 2023-02-17 NOTE — PROGRESS NOTE ADULT - NS ATTEND AMEND GEN_ALL_CORE FT
Patient seen at bedside.    I reviewed and interpreted Videolaryngoscopy recording showing laryngeal granulomas from intubation.      Recommend PPI BID, steroids taper.    Will follow.
72F here with respiratory distress, found to have acute hypoxemic respiratory failure from ARDS and septic shock from GPC bacteremia, COVID, and ESBL UTI, requiring intubation, broad-spectrum IV abx, and pressors, with course c/b NOLA likely from ATN. Palliative care consulted for GOC in the setting of advanced illness and LACE scoring. Patient extubated today, palliative introduced to family, ongoing GOC based on clinical course.   ______________  Kishan Maravilla MD  Palliative Medicine  [facility]   of Geriatric [facility] Palliative Medicine  [phone]

## 2023-02-17 NOTE — PROGRESS NOTE ADULT - SUBJECTIVE AND OBJECTIVE BOX
Gastroenterology progress note:     Patient is a 72y old  Female who presents with a chief complaint of respiratory distress (16 Feb 2023 09:31)       Admitted on: 01-31-23    We are following the patient for dysphagia      s/p PEG placement yesterday   seen in am, comfortable. no events overnight     PAST MEDICAL & SURGICAL HISTORY:  HTN (hypertension)      Diabetes mellitus      Obesity      Gastroesophageal reflux disease      Active asthma      Generalized OA      Afib      H/O hernia repair  2011 and revised in 2013      History of cholecystectomy          MEDICATIONS  (STANDING):  chlorhexidine 2% Cloths 1 Application(s) Topical daily  dextrose 5%. 1000 milliLiter(s) (60 mL/Hr) IV Continuous <Continuous>  enoxaparin Injectable 40 milliGRAM(s) SubCutaneous every 24 hours  hydrochlorothiazide 12.5 milliGRAM(s) Oral daily  insulin lispro (ADMELOG) corrective regimen sliding scale   SubCutaneous every 6 hours  losartan 25 milliGRAM(s) Oral daily  NIFEdipine XL 60 milliGRAM(s) Oral daily  pantoprazole  Injectable 40 milliGRAM(s) IV Push every 12 hours  polyethylene glycol 3350 17 Gram(s) Oral daily    MEDICATIONS  (PRN):  acetaminophen     Tablet .. 650 milliGRAM(s) Oral every 6 hours PRN Temp greater or equal to 38C (100.4F), Mild Pain (1 - 3), Moderate Pain (4 - 6)  senna 2 Tablet(s) Oral at bedtime PRN Constipation      Allergies  No Known Allergies      Review of Systems:   unable to obtain     Physical Examination:  T(C): 36 (02-17-23 @ 08:12), Max: 36.7 (02-16-23 @ 10:53)  HR: 83 (02-17-23 @ 08:12) (59 - 83)  BP: 130/72 (02-17-23 @ 08:12) (130/72 - 185/83)  RR: 18 (02-17-23 @ 08:12) (18 - 20)  SpO2: 100% (02-17-23 @ 08:12) (93% - 100%)  Weight (kg): 109.1 (02-16-23 @ 11:35)    02-16-23 @ 07:01  -  02-17-23 @ 07:00  --------------------------------------------------------  IN: 720 mL / OUT: 1300 mL / NET: -580 mL    02-17-23 @ 07:01  -  02-17-23 @ 10:52  --------------------------------------------------------  IN: 290 mL / OUT: 0 mL / NET: 290 mL        GENERAL: AAOx0  HEAD:  Atraumatic, Normocephalic  EYES: conjunctiva and sclera clear  NECK: Supple, no JVD or thyromegaly  CHEST/LUNG: Clear to auscultation bilaterally; No wheeze, rhonchi, or rales  HEART: Regular rate and rhythm; normal S1, S2, No murmurs.  ABDOMEN: Soft, nontender, nondistended. normal surrounding skin   NEUROLOGY: No asterixis or tremor.   SKIN: Intact, no jaundice     Data:                        8.7    8.37  )-----------( 103      ( 17 Feb 2023 05:39 )             27.6     Hgb trend:  8.7  02-17-23 @ 05:39  8.1  02-16-23 @ 05:50  7.8  02-15-23 @ 23:30  8.5  02-15-23 @ 05:51        02-17    143  |  108  |  17  ----------------------------<  91  3.3<L>   |  26  |  0.7    Ca    10.3      17 Feb 2023 05:39  Mg     1.2     02-15    TPro  5.6<L>  /  Alb  3.2<L>  /  TBili  1.2  /  DBili  x   /  AST  18  /  ALT  18  /  AlkPhos  136<H>  02-17    Liver panel trend:  TBili 1.2   /   AST 18   /   ALT 18   /   AlkP 136   /   Tptn 5.6   /   Alb 3.2    /   DBili --      02-17  TBili 1.0   /   AST 15   /   ALT 16   /   AlkP 109   /   Tptn 5.2   /   Alb 3.2    /   DBili --      02-16  TBili 1.0   /   AST 15   /   ALT 16   /   AlkP 104   /   Tptn 5.1   /   Alb 3.1    /   DBili --      02-15  TBili 1.1   /   AST 17   /   ALT 18   /   AlkP 106   /   Tptn 5.5   /   Alb 3.2    /   DBili --      02-15  TBili 0.8   /   AST 14   /   ALT 16   /   AlkP 96   /   Tptn 5.1   /   Alb 3.1    /   DBili --      02-14  TBili 0.8   /   AST 16   /   ALT 18   /   AlkP 103   /   Tptn 5.2   /   Alb 3.1    /   DBili --      02-13  TBili 0.9   /   AST 14   /   ALT 18   /   AlkP 99   /   Tptn 4.9   /   Alb 2.9    /   DBili --      02-13  TBili 1.1   /   AST 13   /   ALT 20   /   AlkP 93   /   Tptn 4.5   /   Alb 2.7    /   DBili --      02-12  TBili 0.8   /   AST 12   /   ALT 24   /   AlkP 88   /   Tptn 4.4   /   Alb 2.7    /   DBili --      02-11  TBili 0.8   /   AST 15   /   ALT 32   /   AlkP 94   /   Tptn 4.9   /   Alb 2.8    /   DBili --      02-10  TBili 0.7   /   AST 18   /   ALT 35   /   AlkP 106   /   Tptn 5.0   /   Alb 3.0    /   DBili --      02-09  TBili 0.7   /   AST 22   /   ALT 34   /   AlkP 111   /   Tptn 4.9   /   Alb 2.7    /   DBili --      02-08      PT/INR - ( 15 Feb 2023 23:30 )   PT: 12.40 sec;   INR: 1.08 ratio         PTT - ( 15 Feb 2023 23:30 )  PTT:31.3 sec       Radiology:

## 2023-02-17 NOTE — DISCHARGE NOTE PROVIDER - INSTRUCTIONS
Tube feeds.  Tube feeds as follow jevity 1.2 at 120 mL for the first feed then increase to 240mL q6 hours.  Flush feeds with 50mL water before and 100mL after each feed   Tube feeds as follow jevity 1.2 at 240mL q6 hours.   Flush feeds with 50mL water before and 100mL after each feed

## 2023-02-17 NOTE — PROGRESS NOTE ADULT - SUBJECTIVE AND OBJECTIVE BOX
DARCIEPATRICIA  72y, Female  Allergy: No Known Allergies    Hospital Day: 17d    Patient seen and examined earlier today.  No acute events overnight.  AAOx3.     PMH/PSH:  PAST MEDICAL & SURGICAL HISTORY:  HTN (hypertension)      Diabetes mellitus      Obesity      Gastroesophageal reflux disease      Active asthma      Generalized OA      Afib      H/O hernia repair  2011 and revised in 2013      History of cholecystectomy          LAST 24-Hr EVENTS:    VITALS:  T(F): 96.8 (02-17-23 @ 08:12), Max: 97.7 (02-16-23 @ 16:57)  HR: 83 (02-17-23 @ 08:12)  BP: 130/72 (02-17-23 @ 08:12) (130/72 - 185/83)  RR: 18 (02-17-23 @ 08:12)  SpO2: 100% (02-17-23 @ 08:12)          TESTS & MEASUREMENTS:  Weight/BMI  109.1 (02-16-23 @ 11:35)  38.8 (02-16-23 @ 11:35)    02-15-23 @ 07:01  -  02-16-23 @ 07:00  --------------------------------------------------------  IN: 1260 mL / OUT: 1300 mL / NET: -40 mL    02-16-23 @ 07:01  -  02-17-23 @ 07:00  --------------------------------------------------------  IN: 720 mL / OUT: 1300 mL / NET: -580 mL    02-17-23 @ 07:01  -  02-17-23 @ 14:59  --------------------------------------------------------  IN: 650 mL / OUT: 500 mL / NET: 150 mL                            8.7    8.37  )-----------( 103      ( 17 Feb 2023 05:39 )             27.6     PT/INR - ( 15 Feb 2023 23:30 )   PT: 12.40 sec;   INR: 1.08 ratio         PTT - ( 15 Feb 2023 23:30 )  PTT:31.3 sec  INR: 1.08 ratio (02-15-23 @ 23:30)    02-17    143  |  108  |  17  ----------------------------<  91  3.3<L>   |  26  |  0.7    Ca    10.3      17 Feb 2023 05:39  Mg     1.2     02-15    TPro  5.6<L>  /  Alb  3.2<L>  /  TBili  1.2  /  DBili  x   /  AST  18  /  ALT  18  /  AlkPhos  136<H>  02-17    LIVER FUNCTIONS - ( 17 Feb 2023 05:39 )  Alb: 3.2 g/dL / Pro: 5.6 g/dL / ALK PHOS: 136 U/L / ALT: 18 U/L / AST: 18 U/L / GGT: x                           COVID-19 PCR: Detected (02-16-23 @ 15:02)        A1C with Estimated Average Glucose Result: 5.5 % (02-02-23 @ 04:30)      Indwelling Urethral Catheter:     Connect To:  Straight Drainage/Gravity    Indication:  Urine Output Monitoring in Critically Ill (02-13-23 @ 02:15) (not performed)  Indwelling Urethral Catheter:     Connect To:  Straight Drainage/Gravity    Indication:  Urine Output Monitoring in Critically Ill (02-12-23 @ 02:35) (not performed)      RADIOLOGY, ECG, & ADDITIONAL TESTS:  12 Lead ECG:   Systolic  mmHg    Diastolic BP 65 mmHg    Ventricular Rate 76 BPM    Atrial Rate 147 BPM    QRS Duration 96 ms    Q-T Interval 390 ms    QTC Calculation(Bazett) 438 ms    R Axis 120 degrees    T Axis 84 degrees    Diagnosis Line Atrial fibrillation  Right axis deviation  Possible Right ventricular hypertrophy  Abnormal ECG    Confirmed by MAI RHOADES MD ([id number]) on 2/14/2023 7:00:06 AM (02-13-23 @ 16:30)      RECENT DIAGNOSTIC ORDERS:      MEDICATIONS:  MEDICATIONS  (STANDING):  chlorhexidine 2% Cloths 1 Application(s) Topical daily  dextrose 5%. 1000 milliLiter(s) (60 mL/Hr) IV Continuous <Continuous>  enoxaparin Injectable 40 milliGRAM(s) SubCutaneous every 24 hours  hydrochlorothiazide 12.5 milliGRAM(s) Oral daily  insulin lispro (ADMELOG) corrective regimen sliding scale   SubCutaneous every 6 hours  losartan 25 milliGRAM(s) Oral daily  NIFEdipine XL 60 milliGRAM(s) Oral daily  pantoprazole  Injectable 40 milliGRAM(s) IV Push every 12 hours  polyethylene glycol 3350 17 Gram(s) Oral daily    MEDICATIONS  (PRN):  acetaminophen     Tablet .. 650 milliGRAM(s) Oral every 6 hours PRN Temp greater or equal to 38C (100.4F), Mild Pain (1 - 3), Moderate Pain (4 - 6)  senna 2 Tablet(s) Oral at bedtime PRN Constipation      HOME MEDICATIONS:  alendronate 70 mg oral tablet (02-01)  carvedilol 12.5 mg oral tablet (02-01)  cyanocobalamin 1000 mcg oral tablet, extended release (02-01)  labetalol 100 mg oral tablet (11-06)  losartan-hydrochlorothiazide 50 mg-12.5 mg oral tablet (02-01)  magnesium oxide 400 mg oral tablet (02-08)  NIFEdipine 90 mg oral tablet, extended release (02-01)  oxycodone-acetaminophen 5 mg-325 mg oral tablet (11-06)  potassium chloride 10 mEq oral capsule, extended release (11-06)  rivaroxaban 20 mg oral tablet (11-06)  sodium bicarbonate 650 mg oral tablet (02-01)      PHYSICAL EXAM:  GENERAL: NAD, old lady, chronically looks ill, lying in bed comfortably, on NC, NG tube in place   HEAD:  Atraumatic, Normocephalic  EYES: conjunctiva and sclera clear  CHEST/LUNG: poor inspiratory effort   HEART: Regular rate   ABDOMEN: Soft, Nontender, Nondistended. abd binder in place  EXTREMITIES:  No cyanosis, or edema  NERVOUS SYSTEM:  Alert & Oriented X2-3,

## 2023-02-17 NOTE — DISCHARGE NOTE PROVIDER - NSDCFUADDINST_GEN_ALL_CORE_FT
Please call to set up an appointment with your ENT doctor, OBGYN, and PCP.   Per ENT: You are asked to continue with your steroid taper and antacid medication.   Per GI team: you are to take your antacid twice a day for 8 weeks and follow up with them in 2-3 weeks and later a repeat EGD in 8 weeks.  Please call to set up an appointment with your ENT doctor, OBGYN, and PCP.   Per ENT: You are asked to continue with your antacid medication.   Per GI team: you are to take your antacid twice a day for 8 weeks and follow up with them in 2-3 weeks and later a repeat EGD in 8 weeks.

## 2023-02-17 NOTE — DISCHARGE NOTE PROVIDER - NPI NUMBER (FOR SYSADMIN USE ONLY) :
[[id number] [[id number]] [[id number]]

## 2023-02-17 NOTE — PROGRESS NOTE ADULT - ASSESSMENT
71 yo female kth afib, htn, solitary kidney presented from [facility] for SOB.       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    #Right upper lobe pulmonary cyst     #2.4 cm left adrenal gland nodule.    #Debility ( post icu debility) max assist continue with PT     #Anemia no indication for transfusion     resume dvt ppx lovenox     PROGRESS NOTE HANDOFF    Pending: starting and titrating tube feeds, dc planning over weekend.

## 2023-02-17 NOTE — DISCHARGE NOTE PROVIDER - NSFOLLOWUPCLINICS_GEN_ALL_ED_FT
[facility]  OB/GYN  [address] [place] [address]  Phone: [phone]  Fax:   Follow Up Time: 1 month     [facility]  OB/GYN  [address] [place] [address]  Phone: [phone]  Fax:   Follow Up Time: 1 month    [facility] Gastroenterologist  Gastroenterology  .  [place]   Phone:   Fax:

## 2023-02-17 NOTE — DISCHARGE NOTE PROVIDER - NSDCCPCAREPLAN_GEN_ALL_CORE_FT
PRINCIPAL DISCHARGE DIAGNOSIS  Diagnosis: Acute respiratory failure with hypoxia  Assessment and Plan of Treatment: Acute respiratory failure (ARF) is a condition that happens when your lungs cannot get enough oxygen into your blood. ARF can also happen when your lungs cannot get the carbon dioxide out of your blood. A buildup of carbon dioxide in your blood can cause damage to your organs. The decrease in oxygen and the buildup of carbon dioxide can happen at the same time. Acute respiratory failure may develop in minutes, hours, or days. You required intubation for airway protection and were successfullly extubated when you were deemed more stable. Furthermore you underwent PEG tube placement because you did not pass the speech and swallow assesment (used to check if you are able to swallow food and liquid safely).  ESBL UTI/Acute kidney injury:   In addition you were also found to have a urinary tract infection with ESBL which was treated with the abx meropenem and acute kidney injury which is believed to be due to the kidney seeing less blood than usual. The Acute kidney injury had resolved by the time of discharge.   Vaginal bleeding:   In addition you were seen by the gynecology team as you were found to have some vaginal bleeding. Biopsies were performed which did NOT show evidence of malignancy.         SECONDARY DISCHARGE DIAGNOSES  Diagnosis: Pleural effusion  Assessment and Plan of Treatment:     Diagnosis: Acute renal failure  Assessment and Plan of Treatment:     Diagnosis: COVID-19  Assessment and Plan of Treatment:     Diagnosis: Acute UTI  Assessment and Plan of Treatment:      PRINCIPAL DISCHARGE DIAGNOSIS  Diagnosis: Acute respiratory failure with hypoxia  Assessment and Plan of Treatment: Acute respiratory failure (ARF) is a condition that happens when your lungs cannot get enough oxygen into your blood. You required intubation for airway protection and were successfullly extubated when you were deemed more stable. Furthermore you underwent PEG tube placement because you did not pass the speech and swallow assesment (used to check if you are able to swallow food and liquid safely).  Vaginal bleeding:   Biopsies were performed which did NOT show evidence of malignancy. Follow-up outpatient.  Diarrhea - resolved:  Discharge on immodium for 2 more feeds. Per nutrition added Banatrol 3 packs/day for 3 days   A Fib:  Discussed with patient regarding risks and benefits of resuming xarelto including worsening GI or  bleeding, bleeding in general, etc. vs stroke risk by being off xarelto. She wishes to go back on xarelto so resumed home dose of Xarelto. Blood counts have been stable.  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   Dysphagia  Now getting feeds through PEG. Needs speech and swallo evaluations 5x/week at facility. Okay for her to have crushed ice.        Hypomagnesemia most likely due to hydrochorothiazide:  Medication switched to a BP med that does not cause low magnesium.  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  SEEK IMMEDIATE MEDICAL CARE IF YOU HAVE ANY OF THE FOLLOWING SYMPTOMS : shortness of breath, seizure, rash/stiff neck/headache, severe abdominal pain, persistent vomiting, or any signs of dehydration.        SECONDARY DISCHARGE DIAGNOSES  Diagnosis: Pleural effusion  Assessment and Plan of Treatment:     Diagnosis: Acute renal failure  Assessment and Plan of Treatment:     Diagnosis: COVID-19  Assessment and Plan of Treatment:     Diagnosis: Acute UTI  Assessment and Plan of Treatment:      PRINCIPAL DISCHARGE DIAGNOSIS  Diagnosis: Acute respiratory failure with hypoxia  Assessment and Plan of Treatment: Acute respiratory failure (ARF) is a condition that happens when your lungs cannot get enough oxygen into your blood. You required intubation for airway protection and were successfullly extubated when you were deemed more stable. Furthermore you underwent PEG tube placement because you did not pass the speech and swallow assesment (used to check if you are able to swallow food and liquid safely).  Vaginal bleeding:   Biopsies were performed which did NOT show evidence of malignancy. Follow-up outpatient.  Diarrhea - resolved:  Discharge on immodium for 2 more feeds. Per nutrition added Banatrol 3 packs/day for 3 days   A Fib:  Discussed with patient regarding risks and benefits of resuming xarelto including worsening GI or  bleeding, bleeding in general, etc. vs stroke risk by being off xarelto. She wishes to go back on xarelto so resumed home dose of Xarelto. Blood counts have been stable.  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   Dysphagia  Now getting feeds through PEG. Needs speech and swallow evaluations 5x/week at facility. Okay for her to have crushed ice.        Hypomagnesemia most likely due to hydrochorothiazide:  Medication switched to a blood pressure med that does not cause low magnesium.  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  SEEK IMMEDIATE MEDICAL CARE IF YOU HAVE ANY OF THE FOLLOWING SYMPTOMS : shortness of breath, seizure, rash/stiff neck/headache, severe abdominal pain, persistent vomiting, or any signs of dehydration.        SECONDARY DISCHARGE DIAGNOSES  Diagnosis: Pleural effusion  Assessment and Plan of Treatment:     Diagnosis: Acute renal failure  Assessment and Plan of Treatment:     Diagnosis: COVID-19  Assessment and Plan of Treatment:     Diagnosis: Acute UTI  Assessment and Plan of Treatment:      PRINCIPAL DISCHARGE DIAGNOSIS  Diagnosis: Acute respiratory failure with hypoxia  Assessment and Plan of Treatment: Acute respiratory failure (ARF) is a condition that happens when your lungs cannot get enough oxygen into your blood. You required intubation for airway protection and were successfullly extubated when you were deemed more stable. Furthermore you underwent PEG tube placement because you did not pass the speech and swallow assesment (used to check if you are able to swallow food and liquid safely).  Vaginal bleeding:   Biopsies were performed which did NOT show evidence of malignancy. Follow-up outpatient.  Diarrhea - resolved:  Discharge on immodium for 2 more feeds. Per nutrition added Banatrol 3 packs/day for 3 days   A Fib:  Discussed with patient regarding risks and benefits of resuming xarelto including worsening GI or  bleeding, bleeding in general, etc. vs stroke risk by being off xarelto. She wishes to go back on xarelto so resumed home dose of Xarelto. Blood counts have been stable.  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   Will need repeat thyroid panel in 4-6 weeks.   Dysphagia  Now getting feeds through PEG. Needs speech and swallow evaluations 5x/week at facility. Okay for her to have crushed ice.        Hypomagnesemia most likely due to hydrochorothiazide:  Medication switched to a blood pressure med that does not cause low magnesium.  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  SEEK IMMEDIATE MEDICAL CARE IF YOU HAVE ANY OF THE FOLLOWING SYMPTOMS : shortness of breath, seizure, rash/stiff neck/headache, severe abdominal pain, persistent vomiting, or any signs of dehydration.        SECONDARY DISCHARGE DIAGNOSES  Diagnosis: Pleural effusion  Assessment and Plan of Treatment:     Diagnosis: Acute renal failure  Assessment and Plan of Treatment:     Diagnosis: COVID-19  Assessment and Plan of Treatment:     Diagnosis: Acute UTI  Assessment and Plan of Treatment:      PRINCIPAL DISCHARGE DIAGNOSIS  Diagnosis: Acute respiratory failure with hypoxia  Assessment and Plan of Treatment: Acute respiratory failure (ARF) is a condition that happens when your lungs cannot get enough oxygen into your blood. You required intubation for airway protection and were successfullly extubated when you were deemed more stable. Furthermore you underwent PEG tube placement because you did not pass the speech and swallow assesment (used to check if you are able to swallow food and liquid safely).  Vaginal bleeding:   Biopsies were performed which did NOT show evidence of malignancy. Follow-up outpatient.  Diarrhea - resolved:  Discharge on immodium for 2 more feeds.   A Fib:  Discussed with patient regarding risks and benefits of resuming xarelto including worsening GI or  bleeding, bleeding in general, etc. vs stroke risk by being off xarelto. She wishes to go back on xarelto so resumed home dose of Xarelto. Blood counts have been stable.  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   Will need repeat thyroid panel in 4-6 weeks.   Dysphagia  Now getting feeds through PEG. Needs speech and swallow evaluations 5x/week at facility. Okay for her to have crushed ice.        Hypomagnesemia most likely due to hydrochorothiazide:  Medication switched to a blood pressure med that does not cause low magnesium.  Vitamin D Deficiency: You were treated with high dose vitamin d, now on daily low dose. Recheck 25-OH Vitamin D in 3 months.   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  SEEK IMMEDIATE MEDICAL CARE IF YOU HAVE ANY OF THE FOLLOWING SYMPTOMS : shortness of breath, seizure, rash/stiff neck/headache, severe abdominal pain, persistent vomiting, or any signs of dehydration.        SECONDARY DISCHARGE DIAGNOSES  Diagnosis: Pleural effusion  Assessment and Plan of Treatment:     Diagnosis: Acute renal failure  Assessment and Plan of Treatment:     Diagnosis: COVID-19  Assessment and Plan of Treatment:     Diagnosis: Acute UTI  Assessment and Plan of Treatment:      PRINCIPAL DISCHARGE DIAGNOSIS  Diagnosis: Acute respiratory failure with hypoxia  Assessment and Plan of Treatment: Acute respiratory failure (ARF) is a condition that happens when your lungs cannot get enough oxygen into your blood. You required intubation for airway protection and were successfullly extubated when you were deemed more stable. Furthermore you underwent PEG tube placement because you did not pass the speech and swallow assesment (used to check if you are able to swallow food and liquid safely).  Vaginal bleeding:   Biopsies were performed which did NOT show evidence of malignancy. Follow-up outpatient.  Diarrhea - resolved:  Discharge on immodium for 2 more feeds.   A Fib:  Discussed with patient regarding risks and benefits of resuming xarelto including worsening GI or  bleeding, bleeding in general, etc. vs stroke risk by being off xarelto. She wishes to go back on xarelto so resumed home dose of Xarelto. Blood counts have been stable.  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   Will need repeat thyroid panel in 4-6 weeks.   Dysphagia  Now getting feeds through PEG. Needs speech and swallow evaluations 5x/week at facility. Okay for her to have crushed ice.        Hypomagnesemia most likely due to hydrochorothiazide:  Medication switched to a blood pressure med that does not cause low magnesium.  Vitamin D Deficiency: You were treated with high dose vitamin d, now on daily low dose. Recheck 25-OH Vitamin D in 3 months.   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     SEEK IMMEDIATE MEDICAL CARE IF YOU HAVE ANY OF THE FOLLOWING SYMPTOMS : shortness of breath, seizure, rash/stiff neck/headache, severe abdominal pain, persistent vomiting, or any signs of dehydration.        SECONDARY DISCHARGE DIAGNOSES  Diagnosis: Pleural effusion  Assessment and Plan of Treatment:     Diagnosis: Acute renal failure  Assessment and Plan of Treatment:     Diagnosis: COVID-19  Assessment and Plan of Treatment:     Diagnosis: Acute UTI  Assessment and Plan of Treatment:

## 2023-02-17 NOTE — DISCHARGE NOTE PROVIDER - NSDCMRMEDTOKEN_GEN_ALL_CORE_FT
alendronate 70 mg oral tablet: 1 tab(s) orally once a week  carvedilol 12.5 mg oral tablet: 1 tab(s) orally 2 times a day  cyanocobalamin 1000 mcg oral tablet, extended release: 1 tab(s) orally once a day  labetalol 100 mg oral tablet: 1 tab(s) orally 2 times a day  losartan-hydrochlorothiazide 50 mg-12.5 mg oral tablet: 1 tab(s) orally once a day  magnesium oxide 400 mg oral tablet: 1 tab(s) orally once a day  NIFEdipine 90 mg oral tablet, extended release: 1 tab(s) orally once a day  oxycodone-acetaminophen 5 mg-325 mg oral tablet: 1 tab(s) orally every 6 hours, As Needed - 6) for severe pain   potassium chloride 10 mEq oral capsule, extended release: 2 cap(s) orally once a day  rivaroxaban 20 mg oral tablet: 1 tab(s) orally once a day (before a meal)  sodium bicarbonate 650 mg oral tablet: 1 tab(s) orally once a day   alendronate 70 mg oral tablet: 1 tab(s) orally once a week  carvedilol 12.5 mg oral tablet: 1 tab(s) orally 2 times a day  cholecalciferol oral tablet: 400 unit(s) orally 2 times a day  cyanocobalamin 1000 mcg oral tablet, extended release: 1 tab(s) orally once a day  labetalol 100 mg oral tablet: 1 tab(s) orally 2 times a day  loperamide 2 mg oral capsule: 2 cap(s) orally once  losartan-hydrochlorothiazide 50 mg-12.5 mg oral tablet: 1 tab(s) orally once a day  magnesium oxide 400 mg oral tablet: 1 tab(s) orally once a day  NIFEdipine 90 mg oral tablet, extended release: 1 tab(s) orally once a day  oxycodone-acetaminophen 5 mg-325 mg oral tablet: 1 tab(s) orally every 6 hours, As Needed - 6) for severe pain   potassium chloride 10 mEq oral capsule, extended release: 2 cap(s) orally once a day  rivaroxaban 20 mg oral tablet: 1 tab(s) orally once a day (before a meal)  saccharomyces boulardii lyo 250 mg oral capsule: 1 cap(s) orally 2 times a day  sodium bicarbonate 650 mg oral tablet: 1 tab(s) orally once a day   cholecalciferol oral tablet: 400 unit(s) orally 2 times a day  hydroCHLOROthiazide 12.5 mg oral capsule: 1 cap(s) orally once a day  loperamide 2 mg oral capsule: 1 cap(s) orally every 6 hours stop on 2/25  losartan 25 mg oral tablet: 1 tab(s) orally once a day  NIFEdipine 60 mg oral tablet, extended release: 1 tab(s) orally once a day  oxycodone-acetaminophen 5 mg-325 mg oral tablet: 1 tab(s) orally every 6 hours, As Needed - 6) for severe pain   rivaroxaban 20 mg oral tablet: 1 tab(s) orally once a day (before a meal)  saccharomyces boulardii lyo 250 mg oral capsule: 1 cap(s) orally 2 times a day   acetaminophen 325 mg oral tablet: 2 tab(s) orally every 6 hours, As Needed - for fever, for moderate pain  carvedilol 6.25 mg oral tablet: 1 tab(s) orally 2 times a day  cholecalciferol oral tablet: 400 unit(s) orally 2 times a day  loperamide 2 mg oral capsule: 1 cap(s) orally every 12 hours  ADMINISTER BEFORE MEALS  STOP AFTER 2 DOSES  losartan 25 mg oral tablet: 1 tab(s) orally once a day  pantoprazole 40 mg oral delayed release tablet: 1 tab(s) orally every 12 hours  rivaroxaban 20 mg oral tablet: 1 tab(s) orally once a day (before a meal)  saccharomyces boulardii lyo 250 mg oral capsule: 1 cap(s) orally 2 times a day   acetaminophen 325 mg oral tablet: 2 tab(s) orally every 6 hours, As Needed - for fever, for moderate pain  carvedilol 6.25 mg oral tablet: 1 tab(s) orally 2 times a day  cholecalciferol oral tablet: 400 unit(s) orally once a day  loperamide 2 mg oral capsule: 1 cap(s) orally every 12 hours  ADMINISTER BEFORE MEALS  STOP AFTER 2 DOSES  losartan 25 mg oral tablet: 1 tab(s) orally once a day  pantoprazole 40 mg oral delayed release tablet: 1 tab(s) orally every 12 hours  rivaroxaban 20 mg oral tablet: 1 tab(s) orally once a day (before a meal)  saccharomyces boulardii lyo 250 mg oral capsule: 1 cap(s) orally 2 times a day

## 2023-02-17 NOTE — DISCHARGE NOTE PROVIDER - CARE PROVIDERS DIRECT ADDRESSES
,DirectAddress_Unknown,[email or web address] ,[email or web address],[email or web address],DirectAddress_Unknown ,DirectAddress_Unknown,[email or web address]

## 2023-02-17 NOTE — DISCHARGE NOTE PROVIDER - CARE PROVIDER_API CALL
LINSYE NEW  Obstetrics and Gynecology  Phone: ([phone]  Fax: ()-  Follow Up Time: 1 month    Christo Melgar (DO)  Internal Medicine  [address] [place] [address]  Phone: [phone]  Fax: [phone]  Follow Up Time: 1 week   LINSEY NEW  Obstetrics and Gynecology  Phone: ()-  Fax: ()-  Follow Up Time: 1 month    Christo Melgar (DO)  Internal Medicine  [address] [place] [address]  Phone: [phone]  Fax: [phone]  Follow Up Time: 1 week    Caterina Wood)  Surgery  ENT  [address] [facility], [address] [place] [address]  Phone: [phone]  Fax: [phone]  Follow Up Time: 1 week    Edgar Morales)  Internal Medicine  [address] [place] [address]  Phone: [phone]  Fax: [phone]  Follow Up Time: 2 weeks   LINSEY NEW  Obstetrics and Gynecology  Phone: ()-  Fax: ()-  Follow Up Time: 1 month    Christo Melgar (DO)  Internal Medicine  [address] [place] [address]  Phone: [phone]  Fax: [phone]  Follow Up Time: 1 week    Caterina Wood)  Surgery  ENT  [address] [facility], [address] [place] [address]  Phone: [phone]  Fax: [phone]  Follow Up Time: 1 week    Christo Anand)  Gastroenterology; Internal Medicine  [address] [place] [id number]  Phone: [phone]  Fax: [phone]  Follow Up Time: 2 weeks

## 2023-02-17 NOTE — CHART NOTE - NSCHARTNOTEFT_GEN_A_CORE
NUTRITION SUPPORT TEAM  -  PROGRESS NOTE     Interval Events:    s/p peg placement yesterday, feeds started this am with peptamen af at 25ml/hr  +loose BM prior to starting feeds then 4 loose BM's after feeds started, now slowing down and ? resolved  Note that pt has been receiving enteral Mg oxide, senna and miralax- all could result in loose stool  Peptamen AF is an elemental formula and should not cause diarrhea however this formula is no longer indicated   d/w RN and medical resident    REVIEW OF SYSTEMS:  Negative except as noted above.     VITALS:  T(F): 97 (02-17 @ 16:40), Max: 97 (02-17 @ 16:40)  HR: 74 (02-17 @ 16:40) (74 - 74)  BP: 141/65 (02-17 @ 16:40) (141/65 - 141/65)  RR: 18 (02-17 @ 16:40) (18 - 18)  SpO2: 98% (02-17 @ 16:40) (98% - 98%)    HEIGHT/WEIGHT/BMI:   Height (cm): 167.6 (02-16)  Weight (kg): 109.1 (02-16)  BMI (kg/m2): 38.8 (02-16)    I/Os:     02-16-23 @ 07:01  -  02-17-23 @ 07:00  --------------------------------------------------------  IN:    dextrose 5%: 720 mL  Total IN: 720 mL    OUT:    Voided (mL): 1300 mL  Total OUT: 1300 mL    Total NET: -580 mL      MEDICATIONS:   acetaminophen     Tablet .. 650 milliGRAM(s) Oral every 6 hours PRN  chlorhexidine 2% Cloths 1 Application(s) Topical daily  dextrose 5%. 1000 milliLiter(s) IV Continuous <Continuous>  enoxaparin Injectable 40 milliGRAM(s) SubCutaneous every 24 hours  hydrochlorothiazide 12.5 milliGRAM(s) Oral daily  insulin lispro (ADMELOG) corrective regimen sliding scale   SubCutaneous every 6 hours  losartan 25 milliGRAM(s) Oral daily  NIFEdipine XL 60 milliGRAM(s) Oral daily  pantoprazole  Injectable 40 milliGRAM(s) IV Push every 12 hours  senna 2 Tablet(s) Oral at bedtime PRN      LABS:                         8.7    8.37  )-----------( 103      ( 17 Feb 2023 05:39 )             27.6     143  |  108  |  17  ----------------------------<  91          (02-17-23 @ 05:39)  3.3<L>   |  26  |  0.7    Ca    10.3          (02-17-23 @ 05:39)  Mg     1.2         (02-15-23 @ 23:30)    TPro  5.6<L>  /  Alb  3.2<L>  /  TBili  1.2  /  DBili  x   /  AST  18  /  ALT  18  /  AlkPhos  136<H>       02-17-23 @ 05:39    Vitamin D, 25-Hydroxy: <6 ng/mL (02-04 @ 20:22)    Blood Glucose (Past 24 hours):  102 mg/dL (02-17 @ 18:49)  100 mg/dL (02-17 @ 11:10)  99 mg/dL (02-17 @ 05:41)  97 mg/dL (02-16 @ 23:35)  94 mg/dL (02-16 @ 21:12)      DIET:   Diet, NPO with Tube Feed:   Tube Feeding Modality: Gastrostomy  Jevity 1.2 Jeff  Total Volume for 24 Hours (mL): 960  Bolus  Total Volume of Bolus (mL):  240  Total # of Feeds: 4  Tube Feed Frequency: Every 6 hours   Tube Feed Start Time: 17:00  Bolus Feed Rate (mL per Hour): 480   Bolus Feed Duration (in Hours): 0.5  Bolus Feed Instructions:  Please give 120ml for first 2 feeds today then advance to 240ml  Give feeds at meal times and qhs (4 feeds daily but not on q6hr feeding schedule)   Frequency: Every Hour  Free Water Flush Instructions:  Flush feeds with 50ml H20 before and 100ml H20 after each feed (02-17-23 @ 16:36) [Active]    ASSESSMENT  - Acute hypoxemic resp failure  - ARDS, resolved  - Sepsis POA  - ESBL UTI  - G+C bacteremia/ COAG - STAP  - COVID + pneumonia  - NOLA  likely ATN vs prerenal improving  - Possible CAP  - HO congentital unilateral kidney  - HO A FIb  - hypernatremia/hypokalemia  - hypomagnesemia  - severe vitamin D deficiency   - severe to profound pharyngeal dysphagia with silent aspiration  - s/p Peg placement 2/16      PLAN  - needs repeat Mg level  - d/c continuous feeds via PEG  - re-eval meds- d/c Mg oxide (2/16), miralax, senna 2nd to diarrhea  - restart feeds with Jevity 1.2 120ml infused over 30 min via gravity drip X 4 feeds/day. After feeds tolerated X 2 increase to 240ml X 4/day for now  - monitor and document all BM's  - start ergocalciferol 50,000 IU X 4d then give cholecalciferol 4000 IU daily X 21d   - repeat 25-oh vitamin D level 3/13/23  - will follow NUTRITION SUPPORT TEAM  -  PROGRESS NOTE     Interval Events:    s/p peg placement yesterday, feeds started this am with peptamen af at 25ml/hr  +loose BM prior to starting feeds then 4 loose BM's after feeds started, now slowing down and ? resolved  Note that pt has been receiving enteral Mg oxide, senna and miralax- all could result in loose stool  Peptamen AF is an elemental formula and should not cause diarrhea however this formula is no longer indicated   d/w RN, medical resident and     REVIEW OF SYSTEMS:  Negative except as noted above.     VITALS:  T(F): 97 (02-17 @ 16:40), Max: 97 (02-17 @ 16:40)  HR: 74 (02-17 @ 16:40) (74 - 74)  BP: 141/65 (02-17 @ 16:40) (141/65 - 141/65)  RR: 18 (02-17 @ 16:40) (18 - 18)  SpO2: 98% (02-17 @ 16:40) (98% - 98%)    HEIGHT/WEIGHT/BMI:   Height (cm): 167.6 (02-16)  Weight (kg): 109.1 (02-16)  BMI (kg/m2): 38.8 (02-16)    I/Os:     02-16-23 @ 07:01  -  02-17-23 @ 07:00  --------------------------------------------------------  IN:    dextrose 5%: 720 mL  Total IN: 720 mL    OUT:    Voided (mL): 1300 mL  Total OUT: 1300 mL    Total NET: -580 mL      MEDICATIONS:   acetaminophen     Tablet .. 650 milliGRAM(s) Oral every 6 hours PRN  chlorhexidine 2% Cloths 1 Application(s) Topical daily  dextrose 5%. 1000 milliLiter(s) IV Continuous <Continuous>  enoxaparin Injectable 40 milliGRAM(s) SubCutaneous every 24 hours  hydrochlorothiazide 12.5 milliGRAM(s) Oral daily  insulin lispro (ADMELOG) corrective regimen sliding scale   SubCutaneous every 6 hours  losartan 25 milliGRAM(s) Oral daily  NIFEdipine XL 60 milliGRAM(s) Oral daily  pantoprazole  Injectable 40 milliGRAM(s) IV Push every 12 hours  senna 2 Tablet(s) Oral at bedtime PRN      LABS:                         8.7    8.37  )-----------( 103      ( 17 Feb 2023 05:39 )             27.6     143  |  108  |  17  ----------------------------<  91          (02-17-23 @ 05:39)  3.3<L>   |  26  |  0.7    Ca    10.3          (02-17-23 @ 05:39)  Mg     1.2         (02-15-23 @ 23:30)    TPro  5.6<L>  /  Alb  3.2<L>  /  TBili  1.2  /  DBili  x   /  AST  18  /  ALT  18  /  AlkPhos  136<H>       02-17-23 @ 05:39    Vitamin D, 25-Hydroxy: <6 ng/mL (02-04 @ 20:22)    Blood Glucose (Past 24 hours):  102 mg/dL (02-17 @ 18:49)  100 mg/dL (02-17 @ 11:10)  99 mg/dL (02-17 @ 05:41)  97 mg/dL (02-16 @ 23:35)  94 mg/dL (02-16 @ 21:12)      DIET:   Diet, NPO with Tube Feed:   Tube Feeding Modality: Gastrostomy  Jevity 1.2 Jeff  Total Volume for 24 Hours (mL): 960  Bolus  Total Volume of Bolus (mL):  240  Total # of Feeds: 4  Tube Feed Frequency: Every 6 hours   Tube Feed Start Time: 17:00  Bolus Feed Rate (mL per Hour): 480   Bolus Feed Duration (in Hours): 0.5  Bolus Feed Instructions:  Please give 120ml for first 2 feeds today then advance to 240ml  Give feeds at meal times and qhs (4 feeds daily but not on q6hr feeding schedule)   Frequency: Every Hour  Free Water Flush Instructions:  Flush feeds with 50ml H20 before and 100ml H20 after each feed (02-17-23 @ 16:36) [Active]    ASSESSMENT  - Acute hypoxemic resp failure  - ARDS, resolved  - Sepsis POA  - ESBL UTI  - G+C bacteremia/ COAG - STAP  - COVID + pneumonia  - NOLA  likely ATN vs prerenal improving  - Possible CAP  - HO congentital unilateral kidney  - HO A FIb  - hypernatremia/hypokalemia  - hypomagnesemia  - severe vitamin D deficiency   - severe to profound pharyngeal dysphagia with silent aspiration  - s/p Peg placement 2/16      PLAN  - needs repeat Mg level  - d/c continuous feeds via PEG  - re-eval meds- d/c Mg oxide (2/16), miralax, senna 2nd to diarrhea  - restart feeds with Jevity 1.2 120ml infused over 30 min via gravity drip X 4 feeds/day. After feeds tolerated X 2 increase to 240ml X 4/day for now  - monitor and document all BM's  - start ergocalciferol 50,000 IU X 4d then give cholecalciferol 4000 IU daily X 21d   - repeat 25-oh vitamin D level 3/13/23  - will follow

## 2023-02-17 NOTE — DISCHARGE NOTE PROVIDER - PROVIDER TOKENS
PROVIDER:[TOKEN:[[id number]],FOLLOWUP:[1 month]],PROVIDER:[TOKEN:[[id number]],FOLLOWUP:[1 week]] PROVIDER:[TOKEN:[[id number]],FOLLOWUP:[1 month]],PROVIDER:[TOKEN:[[id number]],FOLLOWUP:[1 week]],PROVIDER:[TOKEN:[[id number]],FOLLOWUP:[1 week]],PROVIDER:[TOKEN:[[id number]],FOLLOWUP:[2 weeks]] PROVIDER:[TOKEN:[[id number]],FOLLOWUP:[1 month]],PROVIDER:[TOKEN:[[id number]],FOLLOWUP:[1 week]],PROVIDER:[TOKEN:[[id number]],FOLLOWUP:[1 week]],PROVIDER:[TOKEN:[[id number]],FOLLOWUP:[2 weeks]]

## 2023-02-17 NOTE — PROGRESS NOTE ADULT - SUBJECTIVE AND OBJECTIVE BOX
ENT DAILY PROGRESS NOTE    72 y.o F  F with  ARDS, respiratory failure, COVID+, VC paresis and granulomas. Pt. seen and examined at bedside during ENT rounds, extubated.   FEES 2/14/23 by SLP findings severe to profound pharyngeal dysphagia w /silent aspiration. + glottic gap, + intubation granulomas- still present yet smaller. Recommended NPO w/ plan for long term non-oral means of nutrition and hydration  s/p PEG placement by GI 2/16/23.  FEES video reviewed with Dr. Wood.     REVIEW OF SYSTEMS   [x] A ten-point review of systems was otherwise negative except as noted.     Allergies: NKDA       MEDICATIONS:  acetaminophen     Tablet .. 650 milliGRAM(s) Oral every 6 hours PRN  chlorhexidine 2% Cloths 1 Application(s) Topical daily  dextrose 5%. 1000 milliLiter(s) IV Continuous <Continuous>  enoxaparin Injectable 40 milliGRAM(s) SubCutaneous every 24 hours  hydrochlorothiazide 12.5 milliGRAM(s) Oral daily  insulin lispro (ADMELOG) corrective regimen sliding scale   SubCutaneous every 6 hours  losartan 25 milliGRAM(s) Oral daily  NIFEdipine XL 60 milliGRAM(s) Oral daily  pantoprazole  Injectable 40 milliGRAM(s) IV Push every 12 hours  senna 2 Tablet(s) Oral at bedtime PRN      Vital Signs Last 24 Hrs  T(C): 36.1 (17 Feb 2023 16:40), Max: 36.1 (17 Feb 2023 00:00)  T(F): 97 (17 Feb 2023 16:40), Max: 97 (17 Feb 2023 16:40)  HR: 74 (17 Feb 2023 16:40) (59 - 83)  BP: 141/65 (17 Feb 2023 16:40) (130/72 - 167/85)  RR: 18 (17 Feb 2023 16:40) (18 - 18)  SpO2: 98% (17 Feb 2023 16:40) (98% - 100%)    Parameters below as of 17 Feb 2023 04:00  Patient On (Oxygen Delivery Method): nasal cannula  O2 Flow (L/min): 2        02-16 @ 07:01  -  02-17 @ 07:00  --------------------------------------------------------  IN:    dextrose 5%: 720 mL  Total IN: 720 mL    OUT:    Voided (mL): 1300 mL  Total OUT: 1300 mL    Total NET: -580 mL      02-17 @ 07:01  -  02-17 @ 18:40  --------------------------------------------------------  IN:    dextrose 5%: 480 mL    Enteral Tube Flush: 400 mL    Jevity 1.2: 120 mL    Peptamen A.F.: 205 mL  Total IN: 1205 mL    OUT:    Voided (mL): 500 mL  Total OUT: 500 mL    Total NET: 705 mL      PHYSICAL EXAM:    GEN: NAD, awake and alert. No drooling or pooling of secretions. No stridor or stertor.   SKIN: Good color, non diaphoretic  HEENT: Oral mucosa pink and moist. No erythema or edema noted to buccal mucosa, tongue, FOM, uvula or posterior oropharynx.    NECK: Trachea midline. Neck supple, no TTP to B/L lateral neck, no cervical LAD.  RESP: Non-labored breathing. No use of accessory muscles.  Abd: Soft NT, ND + PEG      LABS:  CBC-                        8.7    8.37  )-----------( 103      ( 17 Feb 2023 05:39 )             27.6     BMP/CMP-  17 Feb 2023 05:39    143    |  108    |  17     ----------------------------<  91     3.3     |  26     |  0.7      Ca    10.3       17 Feb 2023 05:39  Mg     1.2       15 Feb 2023 23:30    TPro  5.6    /  Alb  3.2    /  TBili  1.2    /  DBili  x      /  AST  18     /  ALT  18     /  AlkPhos  136    17 Feb 2023 05:39    Coagulation Studies-  PT/INR - ( 15 Feb 2023 23:30 )   PT: 12.40 sec;   INR: 1.08 ratio         PTT - ( 15 Feb 2023 23:30 )  PTT:31.3 sec  Endocrine Panel-  Calcium, Total Serum: 10.3 mg/dL (02-17 @ 05:39)

## 2023-02-17 NOTE — DISCHARGE NOTE PROVIDER - HOSPITAL COURSE
73 yo F origionally admitted to MICU from ED on 1/31 for acute hypoxic resp failure, COVID, pleural effusion, acute renal failure, UTI. Patient was intubated in ED. Patient was septic and finished course of Ceftriaxone, Levofloxacin, Vanco,  Meropenum for ESBL UTI, and Decadron for COVID. NOLA and sepsis resolved. Extubated on 2/7, but she threw a mucous plug, desaturated, and had to be reintubated on 2/9. Again extubated on 2/11, did well on BIPAP, now transitioned to nasal canula. Patient did not pass speech and swallow eval, including FEES, so GI was consulted for PEG placement for feeding. The patient was given PEG feed and was noted to tolerate feeds well.     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  -Mainly mucus material and inflammatory cells present.    #Class2 obesity BMI 38 patient needs to see dieitian outpatient for further evaluation     # Mild tricuspid regurgitation.    #Right upper lobe pulmonary cyst     #2.4 cm left adrenal gland nodule.    #Debility ( post icu debility) max assist continue with PT     #Anemia no indication for transfusion    71 yo F origionally admitted to MICU from ED on 1/31 for acute hypoxic resp failure, COVID, pleural effusion, acute renal failure, UTI. Patient was intubated in ED. Patient was septic and finished course of Ceftriaxone, Levofloxacin, Vanco,  Meropenum for ESBL UTI, and Decadron for COVID. NOLA and sepsis resolved. Extubated on 2/7, but she threw a mucous plug, desaturated, and had to be reintubated on 2/9. Again extubated on 2/11, did well on BIPAP, now transitioned to nasal canula. Patient did not pass speech and swallow eval, including FEES, so GI was consulted for PEG placement for feeding. The patient was given PEG feed and was noted to tolerate feeds well.     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   After feeds tolerated X 2 increase to 240ml X 4/day     #hypernatremia  c/w FW and D5    #Melena  resolved    #Vaginal bleeding  sp biopsy:   Endometrial biopsy:  -Strips of benign inactive endometrial epithelium, no hyperplasia or  atypia seen.  -Mainly mucus material and inflammatory cells present.    #Class2 obesity BMI 38 patient needs to see dieitian outpatient for further evaluation     # Mild tricuspid regurgitation.    #Right upper lobe pulmonary cyst     #2.4 cm left adrenal gland nodule.    #Debility ( post icu debility) max assist continue with PT     #Anemia no indication for transfusion      73 yo F origionally admitted to MICU from ED on 1/31 for acute hypoxic resp failure, COVID, pleural effusion, acute renal failure, UTI. Patient was intubated in ED. Patient was septic and finished course of Ceftriaxone, Levofloxacin, Vanco,  Meropenum for ESBL UTI, and Decadron for COVID. NOLA and sepsis resolved. Extubated on 2/7, but she threw a mucous plug, desaturated, and had to be reintubated on 2/9. Again extubated on 2/11, did well on BIPAP, transitioned to nasal cannula, now on RA. Patient did not pass speech and swallow eval, including FEES, so GI was consulted for PEG placement for feeding. The patient was given PEG feed and was noted to tolerate feeds well. Noted to have multiple watery bowel movents. C. Diff and GI PCR negative.    #Acute hypoxemic resp failure, with ARDS  and Possible CAP and  COVID +  extubated 2/11   now on nasal cannula saturating well ,  titrate oxygen as tolerated     #Diarrhea  - pt having watery bowel movement  - C. 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   After feeds tolerated X 2 increase to 240ml X 4/day     #hypernatremia  c/w FW and D5    #Melena  resolved    #Vaginal bleeding s/p biopsy:   Endometrial biopsy showed Strips of benign inactive endometrial epithelium, no hyperplasia or  atypia seen. Mainly mucus material and inflammatory cells present.    #Class2 obesity BMI 38 patient needs to see dietician outpatient for further evaluation     # Mild tricuspid regurgitation.    #Right upper lobe pulmonary cyst     #2.4 cm left adrenal gland nodule.    #Debility ( post icu debility) max assist continue with PT     #Anemia no indication for transfusion    71 yo F origionally admitted to MICU from ED on 1/31 for acute hypoxic resp failure, COVID, pleural effusion, acute renal failure, UTI. Patient was intubated in ED. Patient was septic and finished course of Ceftriaxone, Levofloxacin, Vanco,  Meropenum for ESBL UTI, and Decadron for COVID. NOLA and sepsis resolved. Extubated on 2/7, but she threw a mucous plug, desaturated, and had to be reintubated on 2/9. Again extubated on 2/11, did well on BIPAP, transitioned to nasal cannula, now on RA. Patient did not pass speech and swallow eval, including FEES, so GI was consulted for PEG placement for feeding. The patient was given PEG feed and was noted to tolerate feeds well. Noted to have multiple watery bowel movents. C. Diff and GI PCR negative.      #Diarrhea - no improvement  -multiple watery bowel movements   -C. Diff and GI PCR negative  -try imodium prior to next feed    #Acute hypoxemic resp failure, with ARDS  and Possible CAP and  COVID - resolved   -extubated 2/11   -now on room air saturating well      #ESBL UTI  and G+C bacteremia/ COAG - STAP  -sp meropenum     #NOLA  likely ATN vs prerenal on CKD 2 with congenital unilateral kidney   -resolved     #H/O A Fib  -rate controlled   -had discussion with patient regarding risks and benefits of starting a/c, including worsening GI or  bleeding, bleeding in general, etc. vs stroke risk by being off a/c (CHADSVASC 3); she says since she has not had bleeding and Hb is stable, she wants to go back on a/c.  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 outpt f/u      #Class2 obesity BMI 38 patient needs to see dietitian outpatient for further evaluation     #Mild tricuspid regurgitation    #Right upper lobe pulmonary cyst     #2.4 cm left adrenal gland nodule     #Debility ( post icu debility) max assist continue with PT     #Anemia no indication for transfusion     resume therapeutic LVX       73 yo F origionally admitted to MICU from ED on 1/31 for acute hypoxic resp failure, COVID, pleural effusion, acute renal failure, UTI. Patient was intubated in ED. Patient was septic and finished course of Ceftriaxone, Levofloxacin, Vanco,  Meropenum for ESBL UTI, and Decadron for COVID. NOLA and sepsis resolved. Extubated on 2/7, but she threw a mucous plug, desaturated, and had to be reintubated on 2/9. Again extubated on 2/11, did well on BIPAP, transitioned to nasal cannula, now on RA. Patient did not pass speech and swallow eval, including FEES, so GI was consulted for PEG placement for feeding. The patient was given PEG feed and was noted to tolerate feeds well. Noted to have multiple watery bowel movents. C. Diff and GI PCR negative.    #Diarrhea - resolved  -C. Diff and GI PCR negative  -give imodium prior to each feed   -per nutrition added Banatrol 3 packs/day for 3 days     #Acute hypoxemic resp failure, with ARDS  and Possible CAP and  COVID - resolved   -extubated 2/11   -now on room air saturating well      #ESBL UTI  and G+C bacteremia/ COAG - STAP  -sp meropenum     #NOLA  likely ATN vs prerenal on CKD 2 with congenital unilateral kidney   -resolved     #H/O A Fib  -rate controlled   -had discussion with patient regarding risks and benefits of starting a/c, including worsening GI or  bleeding, bleeding in general, etc. vs stroke risk by being off a/c (CHADSVASC 3); she says since she has not had bleeding and Hb is stable, she wants to go back on a/c. Resumed home dose of Xarelto 20mg qhs. 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       #hypernatremia resolved     #Hypomagnesemia - resolved    #Melena   -resolved    #Vaginal bleeding- resolved   -sp biopsy - showing benign endometrium. outpt f/u      #Class2 obesity BMI 38 patient needs to see dietitian outpatient for further evaluation     #Mild tricuspid regurgitation    #Right upper lobe pulmonary cyst     #2.4 cm left adrenal gland nodule     #Debility ( post icu debility) max assist continue with PT   -needs rehab 3-5x/week at facility    #Anemia no indication for transfusion

## 2023-02-17 NOTE — PROGRESS NOTE ADULT - NS_MD_PANP_GEN_ALL_CORE
We discussed that you have made a good recovery from the optic neuritis episode.  We are doing MRI studies of the brain and cervical/thoracic cord to be certain that there has not been any disease activity besides the left optic nerve since last August.  Continue to monitor for new symptoms and notify us promptly.   
Attending and PA/NP shared services statement (NON-critical care):
Attending and PA/NP shared services statement (NON-critical care):

## 2023-02-18 LAB
ALBUMIN SERPL ELPH-MCNC: 3.3 G/DL — LOW (ref 3.5–5.2)
ALP SERPL-CCNC: 148 U/L — HIGH (ref 30–115)
ALT FLD-CCNC: 27 U/L — SIGNIFICANT CHANGE UP (ref 0–41)
ANION GAP SERPL CALC-SCNC: 13 MMOL/L — SIGNIFICANT CHANGE UP (ref 7–14)
ANION GAP SERPL CALC-SCNC: 13 MMOL/L — SIGNIFICANT CHANGE UP (ref 7–14)
AST SERPL-CCNC: 22 U/L — SIGNIFICANT CHANGE UP (ref 0–41)
BILIRUB SERPL-MCNC: 1.3 MG/DL — HIGH (ref 0.2–1.2)
BUN SERPL-MCNC: 15 MG/DL — SIGNIFICANT CHANGE UP (ref 10–20)
BUN SERPL-MCNC: 16 MG/DL — SIGNIFICANT CHANGE UP (ref 10–20)
CALCIUM SERPL-MCNC: 10 MG/DL — SIGNIFICANT CHANGE UP (ref 8.4–10.5)
CALCIUM SERPL-MCNC: 9.9 MG/DL — SIGNIFICANT CHANGE UP (ref 8.4–10.5)
CHLORIDE SERPL-SCNC: 103 MMOL/L — SIGNIFICANT CHANGE UP (ref 98–110)
CHLORIDE SERPL-SCNC: 104 MMOL/L — SIGNIFICANT CHANGE UP (ref 98–110)
CO2 SERPL-SCNC: 22 MMOL/L — SIGNIFICANT CHANGE UP (ref 17–32)
CO2 SERPL-SCNC: 24 MMOL/L — SIGNIFICANT CHANGE UP (ref 17–32)
CREAT SERPL-MCNC: 0.8 MG/DL — SIGNIFICANT CHANGE UP (ref 0.7–1.5)
CREAT SERPL-MCNC: 0.8 MG/DL — SIGNIFICANT CHANGE UP (ref 0.7–1.5)
EGFR: 79 ML/MIN/1.73M2 — SIGNIFICANT CHANGE UP
EGFR: 79 ML/MIN/1.73M2 — SIGNIFICANT CHANGE UP
GLUCOSE BLDC GLUCOMTR-MCNC: 107 MG/DL — HIGH (ref 70–99)
GLUCOSE BLDC GLUCOMTR-MCNC: 87 MG/DL — SIGNIFICANT CHANGE UP (ref 70–99)
GLUCOSE BLDC GLUCOMTR-MCNC: 91 MG/DL — SIGNIFICANT CHANGE UP (ref 70–99)
GLUCOSE BLDC GLUCOMTR-MCNC: 92 MG/DL — SIGNIFICANT CHANGE UP (ref 70–99)
GLUCOSE SERPL-MCNC: 100 MG/DL — HIGH (ref 70–99)
GLUCOSE SERPL-MCNC: 83 MG/DL — SIGNIFICANT CHANGE UP (ref 70–99)
HCT VFR BLD CALC: 28.7 % — LOW (ref 37–47)
HGB BLD-MCNC: 9.5 G/DL — LOW (ref 12–16)
MAGNESIUM SERPL-MCNC: 1.4 MG/DL — LOW (ref 1.8–2.4)
MAGNESIUM SERPL-MCNC: 1.6 MG/DL — LOW (ref 1.8–2.4)
MCHC RBC-ENTMCNC: 29.2 PG — SIGNIFICANT CHANGE UP (ref 27–31)
MCHC RBC-ENTMCNC: 33.1 G/DL — SIGNIFICANT CHANGE UP (ref 32–37)
MCV RBC AUTO: 88.3 FL — SIGNIFICANT CHANGE UP (ref 81–99)
NRBC # BLD: 0 /100 WBCS — SIGNIFICANT CHANGE UP (ref 0–0)
PLATELET # BLD AUTO: 187 K/UL — SIGNIFICANT CHANGE UP (ref 130–400)
POTASSIUM SERPL-MCNC: 3 MMOL/L — LOW (ref 3.5–5)
POTASSIUM SERPL-MCNC: 3.6 MMOL/L — SIGNIFICANT CHANGE UP (ref 3.5–5)
POTASSIUM SERPL-SCNC: 3 MMOL/L — LOW (ref 3.5–5)
POTASSIUM SERPL-SCNC: 3.6 MMOL/L — SIGNIFICANT CHANGE UP (ref 3.5–5)
PROT SERPL-MCNC: 5.7 G/DL — LOW (ref 6–8)
RBC # BLD: 3.25 M/UL — LOW (ref 4.2–5.4)
RBC # FLD: 13.1 % — SIGNIFICANT CHANGE UP (ref 11.5–14.5)
SODIUM SERPL-SCNC: 139 MMOL/L — SIGNIFICANT CHANGE UP (ref 135–146)
SODIUM SERPL-SCNC: 140 MMOL/L — SIGNIFICANT CHANGE UP (ref 135–146)
WBC # BLD: 9.56 K/UL — SIGNIFICANT CHANGE UP (ref 4.8–10.8)
WBC # FLD AUTO: 9.56 K/UL — SIGNIFICANT CHANGE UP (ref 4.8–10.8)

## 2023-02-18 PROCEDURE — 99232 SBSQ HOSP IP/OBS MODERATE 35: CPT

## 2023-02-18 RX ORDER — POTASSIUM CHLORIDE 20 MEQ
20 PACKET (EA) ORAL EVERY 6 HOURS
Refills: 0 | Status: DISCONTINUED | OUTPATIENT
Start: 2023-02-18 | End: 2023-02-19

## 2023-02-18 RX ORDER — POTASSIUM CHLORIDE 20 MEQ
20 PACKET (EA) ORAL
Refills: 0 | Status: COMPLETED | OUTPATIENT
Start: 2023-02-18 | End: 2023-02-18

## 2023-02-18 RX ORDER — POTASSIUM CHLORIDE 20 MEQ
20 PACKET (EA) ORAL EVERY 6 HOURS
Refills: 0 | Status: DISCONTINUED | OUTPATIENT
Start: 2023-02-18 | End: 2023-02-18

## 2023-02-18 RX ORDER — PANTOPRAZOLE SODIUM 20 MG/1
1 TABLET, DELAYED RELEASE ORAL
Qty: 120 | Refills: 0
Start: 2023-02-18 | End: 2023-04-18

## 2023-02-18 RX ORDER — ERGOCALCIFEROL 1.25 MG/1
50000 CAPSULE ORAL EVERY 24 HOURS
Refills: 0 | Status: COMPLETED | OUTPATIENT
Start: 2023-02-18 | End: 2023-02-22

## 2023-02-18 RX ORDER — MAGNESIUM SULFATE 500 MG/ML
2 VIAL (ML) INJECTION
Refills: 0 | Status: COMPLETED | OUTPATIENT
Start: 2023-02-18 | End: 2023-02-18

## 2023-02-18 RX ORDER — CHOLECALCIFEROL (VITAMIN D3) 125 MCG
1 CAPSULE ORAL
Qty: 14 | Refills: 0
Start: 2023-02-18 | End: 2023-03-03

## 2023-02-18 RX ORDER — ERGOCALCIFEROL 1.25 MG/1
1 CAPSULE ORAL
Qty: 7 | Refills: 0
Start: 2023-02-18 | End: 2023-02-24

## 2023-02-18 RX ORDER — MAGNESIUM SULFATE 500 MG/ML
2 VIAL (ML) INJECTION ONCE
Refills: 0 | Status: COMPLETED | OUTPATIENT
Start: 2023-02-18 | End: 2023-02-18

## 2023-02-18 RX ORDER — POTASSIUM CHLORIDE 20 MEQ
20 PACKET (EA) ORAL
Refills: 0 | Status: DISCONTINUED | OUTPATIENT
Start: 2023-02-18 | End: 2023-02-18

## 2023-02-18 RX ADMIN — ENOXAPARIN SODIUM 40 MILLIGRAM(S): 100 INJECTION SUBCUTANEOUS at 15:29

## 2023-02-18 RX ADMIN — Medication 25 GRAM(S): at 15:28

## 2023-02-18 RX ADMIN — LOSARTAN POTASSIUM 25 MILLIGRAM(S): 100 TABLET, FILM COATED ORAL at 05:11

## 2023-02-18 RX ADMIN — Medication 25 GRAM(S): at 21:29

## 2023-02-18 RX ADMIN — Medication 20 MILLIEQUIVALENT(S): at 18:16

## 2023-02-18 RX ADMIN — PANTOPRAZOLE SODIUM 40 MILLIGRAM(S): 20 TABLET, DELAYED RELEASE ORAL at 18:30

## 2023-02-18 RX ADMIN — Medication 50 MILLIEQUIVALENT(S): at 10:21

## 2023-02-18 RX ADMIN — PANTOPRAZOLE SODIUM 40 MILLIGRAM(S): 20 TABLET, DELAYED RELEASE ORAL at 05:11

## 2023-02-18 RX ADMIN — Medication 50 MILLIEQUIVALENT(S): at 11:42

## 2023-02-18 NOTE — PROVIDER CONTACT NOTE (OTHER) - ACTION/TREATMENT ORDERED:
ANKUR midline removed by MD Robertson. Pt tolerated well. No c/o pain. ALBAN midline removed by MD Robertson. Pt tolerated well. No c/o pain.

## 2023-02-18 NOTE — PROGRESS NOTE ADULT - ASSESSMENT
71 yo female kth afib, htn, solitary kidney presented from [facility] for SOB.     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 Diff      #hypernatremia resolved    #Hypomagnesemia - resolved    #Melena  resolved    #Vaginal bleeding  sp biopsy today - showing benign endometrium. outpt f/u     #Class2 obesity BMI 38 patient needs to see dieitian outpatient for further evaluation     #Mild tricuspid regurgitation    #Right upper lobe pulmonary cyst     #2.4 cm left adrenal gland nodule     #Debility ( post icu debility) max assist continue with PT     #Anemia no indication for transfusion     resume dvt ppx lovenox     PROGRESS NOTE HANDOFF    Pending: starting and titrating tube feeds, C. Diff

## 2023-02-18 NOTE — PROGRESS NOTE ADULT - SUBJECTIVE AND OBJECTIVE BOX
DARCIEPATRICIA  72y, Female  Allergy: No Known Allergies    Hospital Day: 18d    Patient seen and examined earlier today.  No acute events overnight.        PMH/PSH:  PAST MEDICAL & SURGICAL HISTORY:  HTN (hypertension)      Diabetes mellitus      Obesity      Gastroesophageal reflux disease      Active asthma      Generalized OA      Afib      H/O hernia repair  2011 and revised in 2013      History of cholecystectomy          LAST 24-Hr EVENTS:    VITALS:  T(F): 98.7 (02-18-23 @ 11:12), Max: 98.7 (02-18-23 @ 11:12)  HR: 64 (02-18-23 @ 11:12)  BP: 156/74 (02-18-23 @ 11:12) (121/78 - 156/74)  RR: 18 (02-18-23 @ 11:12)  SpO2: 97% (02-18-23 @ 11:12)          TESTS & MEASUREMENTS:  Weight/BMI  109.1 (02-16-23 @ 11:35)  38.8 (02-16-23 @ 11:35)    02-16-23 @ 07:01  -  02-17-23 @ 07:00  --------------------------------------------------------  IN: 720 mL / OUT: 1300 mL / NET: -580 mL    02-17-23 @ 07:01  -  02-18-23 @ 07:00  --------------------------------------------------------  IN: 2525 mL / OUT: 900 mL / NET: 1625 mL                            9.5    9.56  )-----------( 187      ( 18 Feb 2023 05:43 )             28.7       INR: 1.08 ratio (02-15-23 @ 23:30)    02-18    139  |  104  |  16  ----------------------------<  100<H>  3.0<L>   |  22  |  0.8    Ca    10.0      18 Feb 2023 05:43  Mg     1.4     02-18    TPro  5.7<L>  /  Alb  3.3<L>  /  TBili  1.3<H>  /  DBili  x   /  AST  22  /  ALT  27  /  AlkPhos  148<H>  02-18    LIVER FUNCTIONS - ( 18 Feb 2023 05:43 )  Alb: 3.3 g/dL / Pro: 5.7 g/dL / ALK PHOS: 148 U/L / ALT: 27 U/L / AST: 22 U/L / GGT: x                           COVID-19 PCR: Detected (02-16-23 @ 15:02)        A1C with Estimated Average Glucose Result: 5.5 % (02-02-23 @ 04:30)      Indwelling Urethral Catheter:     Connect To:  Straight Drainage/Gravity    Indication:  Urine Output Monitoring in Critically Ill (02-13-23 @ 02:15) (not performed)  Indwelling Urethral Catheter:     Connect To:  Straight Drainage/Gravity    Indication:  Urine Output Monitoring in Critically Ill (02-12-23 @ 02:35) (not performed)      RADIOLOGY, ECG, & ADDITIONAL TESTS:  12 Lead ECG:   Systolic  mmHg    Diastolic BP 65 mmHg    Ventricular Rate 76 BPM    Atrial Rate 147 BPM    QRS Duration 96 ms    Q-T Interval 390 ms    QTC Calculation(Bazett) 438 ms    R Axis 120 degrees    T Axis 84 degrees    Diagnosis Line Atrial fibrillation  Right axis deviation  Possible Right ventricular hypertrophy  Abnormal ECG    Confirmed by MAI RHOADES MD ([id number]) on 2/14/2023 7:00:06 AM (02-13-23 @ 16:30)      RECENT DIAGNOSTIC ORDERS:  Culture - Acid Fast - Stool w/Smear: Routine  Specimen Source: Stool  Addl Info: per nurse had more than 5 loose stools will need to r/o C. diff as patient had been on fee (02-18-23 @ 13:51)  Diet, NPO with Tube Feed:   Tube Feeding Modality: Gastrostomy  Peptamen 1.5  (not AF)  Total Volume for 24 Hours (mL): 480  Bolus  Total Volume of Bolus (mL):  120  Total # of Feeds: 4  Tube Feed Frequency: Every 6 hours   Tube Feed Start Time: 14:48  Bolus Feed Rate (mL per Hour): 480   Bolus Feed Duration (in Hours): 0.5  Bolus Feed Instructions:  Please give 120ml for first 2 feeds today then advance to 240ml  Give feeds at meal times and qhs (4 feeds daily but not on q6hr feeding schedule)   Frequency: Every Hour  Free Water Flush Instructions:  Flush feeds with 50ml H20 before and 100ml H20 after each feed (02-18-23 @ 13:49)  Basic Metabolic Panel: 16:00 (02-18-23 @ 13:33)      MEDICATIONS:  MEDICATIONS  (STANDING):  chlorhexidine 2% Cloths 1 Application(s) Topical daily  enoxaparin Injectable 40 milliGRAM(s) SubCutaneous every 24 hours  ergocalciferol 80367 Unit(s) Oral every 24 hours  hydrochlorothiazide 12.5 milliGRAM(s) Oral daily  insulin lispro (ADMELOG) corrective regimen sliding scale   SubCutaneous every 6 hours  losartan 25 milliGRAM(s) Oral daily  magnesium sulfate  IVPB 2 Gram(s) IV Intermittent once  NIFEdipine XL 60 milliGRAM(s) Oral daily  pantoprazole  Injectable 40 milliGRAM(s) IV Push every 12 hours  potassium chloride   Solution 20 milliEquivalent(s) Oral every 6 hours    MEDICATIONS  (PRN):  acetaminophen     Tablet .. 650 milliGRAM(s) Oral every 6 hours PRN Temp greater or equal to 38C (100.4F), Mild Pain (1 - 3), Moderate Pain (4 - 6)  senna 2 Tablet(s) Oral at bedtime PRN Constipation      HOME MEDICATIONS:  alendronate 70 mg oral tablet (02-01)  carvedilol 12.5 mg oral tablet (02-01)  cyanocobalamin 1000 mcg oral tablet, extended release (02-01)  labetalol 100 mg oral tablet (11-06)  losartan-hydrochlorothiazide 50 mg-12.5 mg oral tablet (02-01)  magnesium oxide 400 mg oral tablet (02-08)  NIFEdipine 90 mg oral tablet, extended release (02-01)  oxycodone-acetaminophen 5 mg-325 mg oral tablet (11-06)  potassium chloride 10 mEq oral capsule, extended release (11-06)  rivaroxaban 20 mg oral tablet (11-06)  sodium bicarbonate 650 mg oral tablet (02-01)      PHYSICAL EXAM:  GENERAL: NAD, old lady, chronically looks ill, lying in bed comfortably, on NC, NG tube in place   HEAD:  Atraumatic, Normocephalic  EYES: conjunctiva and sclera clear  CHEST/LUNG: poor inspiratory effort   HEART: Regular rate   ABDOMEN: Soft, Nontender, Nondistended. abd binder in place  EXTREMITIES:  No cyanosis, or edema  NERVOUS SYSTEM:  Alert & Oriented X2-3

## 2023-02-19 LAB
GLUCOSE BLDC GLUCOMTR-MCNC: 101 MG/DL — HIGH (ref 70–99)
GLUCOSE BLDC GLUCOMTR-MCNC: 103 MG/DL — HIGH (ref 70–99)
GLUCOSE BLDC GLUCOMTR-MCNC: 93 MG/DL — SIGNIFICANT CHANGE UP (ref 70–99)

## 2023-02-19 PROCEDURE — 99232 SBSQ HOSP IP/OBS MODERATE 35: CPT

## 2023-02-19 RX ADMIN — Medication 20 MILLIEQUIVALENT(S): at 11:08

## 2023-02-19 RX ADMIN — ENOXAPARIN SODIUM 40 MILLIGRAM(S): 100 INJECTION SUBCUTANEOUS at 16:21

## 2023-02-19 RX ADMIN — Medication 20 MILLIEQUIVALENT(S): at 05:52

## 2023-02-19 RX ADMIN — LOSARTAN POTASSIUM 25 MILLIGRAM(S): 100 TABLET, FILM COATED ORAL at 05:51

## 2023-02-19 RX ADMIN — Medication 20 MILLIEQUIVALENT(S): at 01:24

## 2023-02-19 RX ADMIN — Medication 25 GRAM(S): at 00:39

## 2023-02-19 RX ADMIN — CHLORHEXIDINE GLUCONATE 1 APPLICATION(S): 213 SOLUTION TOPICAL at 22:13

## 2023-02-19 RX ADMIN — PANTOPRAZOLE SODIUM 40 MILLIGRAM(S): 20 TABLET, DELAYED RELEASE ORAL at 18:01

## 2023-02-19 RX ADMIN — PANTOPRAZOLE SODIUM 40 MILLIGRAM(S): 20 TABLET, DELAYED RELEASE ORAL at 05:51

## 2023-02-19 RX ADMIN — CHLORHEXIDINE GLUCONATE 1 APPLICATION(S): 213 SOLUTION TOPICAL at 00:38

## 2023-02-19 NOTE — PROGRESS NOTE ADULT - ASSESSMENT
71 yo female kth afib, htn, solitary kidney presented from [facility] for SOB.     #Diarrhea   multiple watery bowel movements   f/u C. 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 Diff      #hypernatremia resolved    #Hypomagnesemia - resolved    #Melena  resolved    #Vaginal bleeding  sp biopsy today - showing benign endometrium. outpt f/u     #Class2 obesity BMI 38 patient needs to see dieitian outpatient for further evaluation     #Mild tricuspid regurgitation    #Right upper lobe pulmonary cyst     #2.4 cm left adrenal gland nodule     #Debility ( post icu debility) max assist continue with PT     #Anemia no indication for transfusion     resume dvt ppx lovenox     PROGRESS NOTE HANDOFF    Pending: starting and titrating tube feeds, C. Diff

## 2023-02-19 NOTE — PROGRESS NOTE ADULT - SUBJECTIVE AND OBJECTIVE BOX
PATRICIA SPRAGUE  72y, Female  Allergy: No Known Allergies    Hospital Day: 19d    Patient seen and examined earlier today.  No acute events overnight.  reports having diarrhea.     PMH/PSH:  PAST MEDICAL & SURGICAL HISTORY:  HTN (hypertension)      Diabetes mellitus      Obesity      Gastroesophageal reflux disease      Active asthma      Generalized OA      Afib      H/O hernia repair  2011 and revised in 2013      History of cholecystectomy          LAST 24-Hr EVENTS:    VITALS:  T(F): 96.9 (02-19-23 @ 12:56), Max: 97.3 (02-18-23 @ 20:47)  HR: 65 (02-19-23 @ 12:56)  BP: 136/72 (02-19-23 @ 12:56) (118/59 - 161/71)  RR: 18 (02-19-23 @ 12:56)  SpO2: 97% (02-19-23 @ 04:54)          TESTS & MEASUREMENTS:  Weight/BMI  109.1 (02-16-23 @ 11:35)  38.8 (02-16-23 @ 11:35)    02-17-23 @ 07:01  -  02-18-23 @ 07:00  --------------------------------------------------------  IN: 2645 mL / OUT: 900 mL / NET: 1745 mL    02-18-23 @ 07:01  -  02-19-23 @ 07:00  --------------------------------------------------------  IN: 150 mL / OUT: 300 mL / NET: -150 mL                            9.5    9.56  )-----------( 187      ( 18 Feb 2023 05:43 )             28.7       INR: 1.08 ratio (02-15-23 @ 23:30)    02-18    140  |  103  |  15  ----------------------------<  83  3.6   |  24  |  0.8    Ca    9.9      18 Feb 2023 18:00  Mg     1.6     02-18    TPro  5.7<L>  /  Alb  3.3<L>  /  TBili  1.3<H>  /  DBili  x   /  AST  22  /  ALT  27  /  AlkPhos  148<H>  02-18    LIVER FUNCTIONS - ( 18 Feb 2023 05:43 )  Alb: 3.3 g/dL / Pro: 5.7 g/dL / ALK PHOS: 148 U/L / ALT: 27 U/L / AST: 22 U/L / GGT: x                           COVID-19 PCR: Detected (02-16-23 @ 15:02)        A1C with Estimated Average Glucose Result: 5.5 % (02-02-23 @ 04:30)      Indwelling Urethral Catheter:     Connect To:  Straight Drainage/Gravity    Indication:  Urine Output Monitoring in Critically Ill (02-13-23 @ 02:15) (not performed)      RADIOLOGY, ECG, & ADDITIONAL TESTS:  12 Lead ECG:   Systolic  mmHg    Diastolic BP 65 mmHg    Ventricular Rate 76 BPM    Atrial Rate 147 BPM    QRS Duration 96 ms    Q-T Interval 390 ms    QTC Calculation(Bazett) 438 ms    R Axis 120 degrees    T Axis 84 degrees    Diagnosis Line Atrial fibrillation  Right axis deviation  Possible Right ventricular hypertrophy  Abnormal ECG    Confirmed by MAI RHOADES MD ([id number]) on 2/14/2023 7:00:06 AM (02-13-23 @ 16:30)      RECENT DIAGNOSTIC ORDERS:  Diet, NPO with Tube Feed:   Tube Feeding Modality: Gastrostomy  Peptamen 1.5 (not AF)  Total Volume for 24 Hours (mL): 960  Bolus  Total Volume of Bolus (mL):  240  Total # of Feeds: 4  Tube Feed Frequency: Every 6 hours   Tube Feed Start Time: 12:30  Bolus Feed Rate (mL per Hour): 480   Bolus Feed Duration (in Hours): 0.5   Frequency: Every Hour  Free Water Flush Instructions:  Flush feeds with 50ml H20 before and 100ml H20 after each feed (02-19-23 @ 12:29)  Magnesium, Serum: AM Sched. Collection: 20-Feb-2023 04:30 (02-19-23 @ 00:24)  Comprehensive Metabolic Panel: AM Sched. Collection: 20-Feb-2023 04:30 (02-19-23 @ 00:24)  Complete Blood Count + Automated Diff: AM Sched. Collection: 20-Feb-2023 04:30 (02-19-23 @ 00:24)      MEDICATIONS:  MEDICATIONS  (STANDING):  chlorhexidine 2% Cloths 1 Application(s) Topical daily  enoxaparin Injectable 40 milliGRAM(s) SubCutaneous every 24 hours  ergocalciferol 52311 Unit(s) Oral every 24 hours  hydrochlorothiazide 12.5 milliGRAM(s) Oral daily  insulin lispro (ADMELOG) corrective regimen sliding scale   SubCutaneous every 6 hours  losartan 25 milliGRAM(s) Oral daily  NIFEdipine XL 60 milliGRAM(s) Oral daily  pantoprazole  Injectable 40 milliGRAM(s) IV Push every 12 hours  potassium chloride   Powder 20 milliEquivalent(s) Oral every 6 hours    MEDICATIONS  (PRN):  acetaminophen     Tablet .. 650 milliGRAM(s) Oral every 6 hours PRN Temp greater or equal to 38C (100.4F), Mild Pain (1 - 3), Moderate Pain (4 - 6)      HOME MEDICATIONS:  alendronate 70 mg oral tablet (02-01)  carvedilol 12.5 mg oral tablet (02-01)  cyanocobalamin 1000 mcg oral tablet, extended release (02-01)  labetalol 100 mg oral tablet (11-06)  losartan-hydrochlorothiazide 50 mg-12.5 mg oral tablet (02-01)  magnesium oxide 400 mg oral tablet (02-08)  NIFEdipine 90 mg oral tablet, extended release (02-01)  oxycodone-acetaminophen 5 mg-325 mg oral tablet (11-06)  potassium chloride 10 mEq oral capsule, extended release (11-06)  rivaroxaban 20 mg oral tablet (11-06)  sodium bicarbonate 650 mg oral tablet (02-01)      PHYSICAL EXAM:  GENERAL: NAD, old lady, chronically looks ill, lying in bed comfortably, on NC, NG tube in place   HEAD:  Atraumatic, Normocephalic  EYES: conjunctiva and sclera clear  CHEST/LUNG: poor inspiratory effort   HEART: Regular rate   ABDOMEN: Soft, Nontender, Nondistended. abd binder in place  EXTREMITIES:  No cyanosis, or edema  NERVOUS SYSTEM:  Alert & Oriented X2-3

## 2023-02-20 LAB
ALBUMIN SERPL ELPH-MCNC: 3.2 G/DL — LOW (ref 3.5–5.2)
ALP SERPL-CCNC: 156 U/L — HIGH (ref 30–115)
ALT FLD-CCNC: 26 U/L — SIGNIFICANT CHANGE UP (ref 0–41)
ANION GAP SERPL CALC-SCNC: 9 MMOL/L — SIGNIFICANT CHANGE UP (ref 7–14)
AST SERPL-CCNC: 15 U/L — SIGNIFICANT CHANGE UP (ref 0–41)
BASOPHILS # BLD AUTO: 0.05 K/UL — SIGNIFICANT CHANGE UP (ref 0–0.2)
BASOPHILS NFR BLD AUTO: 0.7 % — SIGNIFICANT CHANGE UP (ref 0–1)
BILIRUB SERPL-MCNC: 0.9 MG/DL — SIGNIFICANT CHANGE UP (ref 0.2–1.2)
BUN SERPL-MCNC: 14 MG/DL — SIGNIFICANT CHANGE UP (ref 10–20)
C DIFF BY PCR RESULT: SIGNIFICANT CHANGE UP
CALCIUM SERPL-MCNC: 10.4 MG/DL — SIGNIFICANT CHANGE UP (ref 8.4–10.5)
CHLORIDE SERPL-SCNC: 111 MMOL/L — HIGH (ref 98–110)
CO2 SERPL-SCNC: 24 MMOL/L — SIGNIFICANT CHANGE UP (ref 17–32)
CREAT SERPL-MCNC: 0.8 MG/DL — SIGNIFICANT CHANGE UP (ref 0.7–1.5)
EGFR: 79 ML/MIN/1.73M2 — SIGNIFICANT CHANGE UP
EOSINOPHIL # BLD AUTO: 0.4 K/UL — SIGNIFICANT CHANGE UP (ref 0–0.7)
EOSINOPHIL NFR BLD AUTO: 5.3 % — SIGNIFICANT CHANGE UP (ref 0–8)
GLUCOSE BLDC GLUCOMTR-MCNC: 140 MG/DL — HIGH (ref 70–99)
GLUCOSE BLDC GLUCOMTR-MCNC: 94 MG/DL — SIGNIFICANT CHANGE UP (ref 70–99)
GLUCOSE BLDC GLUCOMTR-MCNC: 97 MG/DL — SIGNIFICANT CHANGE UP (ref 70–99)
GLUCOSE BLDC GLUCOMTR-MCNC: 97 MG/DL — SIGNIFICANT CHANGE UP (ref 70–99)
GLUCOSE SERPL-MCNC: 118 MG/DL — HIGH (ref 70–99)
HCT VFR BLD CALC: 28.4 % — LOW (ref 37–47)
HGB BLD-MCNC: 9.1 G/DL — LOW (ref 12–16)
IMM GRANULOCYTES NFR BLD AUTO: 0.5 % — HIGH (ref 0.1–0.3)
LYMPHOCYTES # BLD AUTO: 1.81 K/UL — SIGNIFICANT CHANGE UP (ref 1.2–3.4)
LYMPHOCYTES # BLD AUTO: 23.8 % — SIGNIFICANT CHANGE UP (ref 20.5–51.1)
MAGNESIUM SERPL-MCNC: 1.6 MG/DL — LOW (ref 1.8–2.4)
MCHC RBC-ENTMCNC: 28.7 PG — SIGNIFICANT CHANGE UP (ref 27–31)
MCHC RBC-ENTMCNC: 32 G/DL — SIGNIFICANT CHANGE UP (ref 32–37)
MCV RBC AUTO: 89.6 FL — SIGNIFICANT CHANGE UP (ref 81–99)
MONOCYTES # BLD AUTO: 0.64 K/UL — HIGH (ref 0.1–0.6)
MONOCYTES NFR BLD AUTO: 8.4 % — SIGNIFICANT CHANGE UP (ref 1.7–9.3)
NEUTROPHILS # BLD AUTO: 4.66 K/UL — SIGNIFICANT CHANGE UP (ref 1.4–6.5)
NEUTROPHILS NFR BLD AUTO: 61.3 % — SIGNIFICANT CHANGE UP (ref 42.2–75.2)
NRBC # BLD: 0 /100 WBCS — SIGNIFICANT CHANGE UP (ref 0–0)
PLATELET # BLD AUTO: 186 K/UL — SIGNIFICANT CHANGE UP (ref 130–400)
POTASSIUM SERPL-MCNC: 3.2 MMOL/L — LOW (ref 3.5–5)
POTASSIUM SERPL-SCNC: 3.2 MMOL/L — LOW (ref 3.5–5)
PROT SERPL-MCNC: 5.6 G/DL — LOW (ref 6–8)
RBC # BLD: 3.17 M/UL — LOW (ref 4.2–5.4)
RBC # FLD: 13.2 % — SIGNIFICANT CHANGE UP (ref 11.5–14.5)
SODIUM SERPL-SCNC: 144 MMOL/L — SIGNIFICANT CHANGE UP (ref 135–146)
WBC # BLD: 7.6 K/UL — SIGNIFICANT CHANGE UP (ref 4.8–10.8)
WBC # FLD AUTO: 7.6 K/UL — SIGNIFICANT CHANGE UP (ref 4.8–10.8)

## 2023-02-20 PROCEDURE — 99232 SBSQ HOSP IP/OBS MODERATE 35: CPT

## 2023-02-20 RX ORDER — POTASSIUM CHLORIDE 20 MEQ
20 PACKET (EA) ORAL
Refills: 0 | Status: DISCONTINUED | OUTPATIENT
Start: 2023-02-20 | End: 2023-02-20

## 2023-02-20 RX ORDER — MAGNESIUM SULFATE 500 MG/ML
2 VIAL (ML) INJECTION ONCE
Refills: 0 | Status: COMPLETED | OUTPATIENT
Start: 2023-02-20 | End: 2023-02-20

## 2023-02-20 RX ORDER — POTASSIUM CHLORIDE 20 MEQ
40 PACKET (EA) ORAL ONCE
Refills: 0 | Status: COMPLETED | OUTPATIENT
Start: 2023-02-20 | End: 2023-02-20

## 2023-02-20 RX ADMIN — Medication 25 GRAM(S): at 22:29

## 2023-02-20 RX ADMIN — Medication 40 MILLIEQUIVALENT(S): at 22:29

## 2023-02-20 RX ADMIN — Medication 60 MILLIGRAM(S): at 05:26

## 2023-02-20 RX ADMIN — LOSARTAN POTASSIUM 25 MILLIGRAM(S): 100 TABLET, FILM COATED ORAL at 05:25

## 2023-02-20 RX ADMIN — PANTOPRAZOLE SODIUM 40 MILLIGRAM(S): 20 TABLET, DELAYED RELEASE ORAL at 05:27

## 2023-02-20 RX ADMIN — PANTOPRAZOLE SODIUM 40 MILLIGRAM(S): 20 TABLET, DELAYED RELEASE ORAL at 17:01

## 2023-02-20 RX ADMIN — ENOXAPARIN SODIUM 40 MILLIGRAM(S): 100 INJECTION SUBCUTANEOUS at 17:02

## 2023-02-20 NOTE — PROGRESS NOTE ADULT - ASSESSMENT
71 yo female kth afib, htn, solitary kidney presented from [facility] for SOB.     #Diarrhea   multiple watery bowel movements   C. 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 Diff      #hypernatremia resolved    #Hypomagnesemia - resolved    #Melena  resolved    #Vaginal bleeding  sp biopsy today - showing benign endometrium. outpt f/u     #Class2 obesity BMI 38 patient needs to see dieitian outpatient for further evaluation     #Mild tricuspid regurgitation    #Right upper lobe pulmonary cyst     #2.4 cm left adrenal gland nodule     #Debility ( post icu debility) max assist continue with PT     #Anemia no indication for transfusion     resume dvt ppx lovenox     PROGRESS NOTE HANDOFF    Pending: starting and titrating tube feeds, GI PCR

## 2023-02-20 NOTE — PROGRESS NOTE ADULT - SUBJECTIVE AND OBJECTIVE BOX
DARCIE PATRICIA  72y, Female  Allergy: No Known Allergies    Hospital Day: 20d    Patient seen and examined earlier today.  No acute events overnight.      PMH/PSH:  PAST MEDICAL & SURGICAL HISTORY:  HTN (hypertension)      Diabetes mellitus      Obesity      Gastroesophageal reflux disease      Active asthma      Generalized OA      Afib      H/O hernia repair  2011 and revised in 2013      History of cholecystectomy          LAST 24-Hr EVENTS:    VITALS:  T(F): 97.9 (02-20-23 @ 09:55), Max: 97.9 (02-20-23 @ 09:55)  HR: 62 (02-20-23 @ 09:55)  BP: 143/66 (02-20-23 @ 09:55) (129/69 - 164/72)  RR: 18 (02-20-23 @ 09:55)  SpO2: 97% (02-20-23 @ 09:55)          TESTS & MEASUREMENTS:  Weight/BMI  109.1 (02-16-23 @ 11:35)  38.8 (02-16-23 @ 11:35)    02-18-23 @ 07:01  -  02-19-23 @ 07:00  --------------------------------------------------------  IN: 440 mL / OUT: 300 mL / NET: 140 mL    02-19-23 @ 07:01  -  02-20-23 @ 07:00  --------------------------------------------------------  IN: 780 mL / OUT: 450 mL / NET: 330 mL                            9.1    7.60  )-----------( 186      ( 20 Feb 2023 06:38 )             28.4       INR: 1.08 ratio (02-15-23 @ 23:30)    02-20    144  |  111<H>  |  14  ----------------------------<  118<H>  3.2<L>   |  24  |  0.8    Ca    10.4      20 Feb 2023 06:38  Mg     1.6     02-20    TPro  5.6<L>  /  Alb  3.2<L>  /  TBili  0.9  /  DBili  x   /  AST  15  /  ALT  26  /  AlkPhos  156<H>  02-20    LIVER FUNCTIONS - ( 20 Feb 2023 06:38 )  Alb: 3.2 g/dL / Pro: 5.6 g/dL / ALK PHOS: 156 U/L / ALT: 26 U/L / AST: 15 U/L / GGT: x                           COVID-19 PCR: Detected (02-16-23 @ 15:02)        A1C with Estimated Average Glucose Result: 5.5 % (02-02-23 @ 04:30)          RADIOLOGY, ECG, & ADDITIONAL TESTS:  12 Lead ECG:   Systolic  mmHg    Diastolic BP 65 mmHg    Ventricular Rate 76 BPM    Atrial Rate 147 BPM    QRS Duration 96 ms    Q-T Interval 390 ms    QTC Calculation(Bazett) 438 ms    R Axis 120 degrees    T Axis 84 degrees    Diagnosis Line Atrial fibrillation  Right axis deviation  Possible Right ventricular hypertrophy  Abnormal ECG    Confirmed by MAI RHOADES MD ([id number]) on 2/14/2023 7:00:06 AM (02-13-23 @ 16:30)      RECENT DIAGNOSTIC ORDERS:  GI PCR Panel Stool: STAT (02-20-23 @ 13:20)  Magnesium, Serum: AM Sched. Collection: 21-Feb-2023 04:30 (02-20-23 @ 10:54)  Comprehensive Metabolic Panel: AM Sched. Collection: 21-Feb-2023 04:30 (02-20-23 @ 10:54)  Complete Blood Count + Automated Diff: AM Sched. Collection: 21-Feb-2023 04:30 (02-20-23 @ 10:54)      MEDICATIONS:  MEDICATIONS  (STANDING):  chlorhexidine 2% Cloths 1 Application(s) Topical daily  enoxaparin Injectable 40 milliGRAM(s) SubCutaneous every 24 hours  ergocalciferol 73388 Unit(s) Oral every 24 hours  hydrochlorothiazide 12.5 milliGRAM(s) Oral daily  insulin lispro (ADMELOG) corrective regimen sliding scale   SubCutaneous every 6 hours  losartan 25 milliGRAM(s) Oral daily  NIFEdipine XL 60 milliGRAM(s) Oral daily  pantoprazole  Injectable 40 milliGRAM(s) IV Push every 12 hours    MEDICATIONS  (PRN):  acetaminophen     Tablet .. 650 milliGRAM(s) Oral every 6 hours PRN Temp greater or equal to 38C (100.4F), Mild Pain (1 - 3), Moderate Pain (4 - 6)      HOME MEDICATIONS:  alendronate 70 mg oral tablet (02-01)  carvedilol 12.5 mg oral tablet (02-01)  cyanocobalamin 1000 mcg oral tablet, extended release (02-01)  labetalol 100 mg oral tablet (11-06)  losartan-hydrochlorothiazide 50 mg-12.5 mg oral tablet (02-01)  magnesium oxide 400 mg oral tablet (02-08)  NIFEdipine 90 mg oral tablet, extended release (02-01)  oxycodone-acetaminophen 5 mg-325 mg oral tablet (11-06)  potassium chloride 10 mEq oral capsule, extended release (11-06)  rivaroxaban 20 mg oral tablet (11-06)  sodium bicarbonate 650 mg oral tablet (02-01)      PHYSICAL EXAM:  GENERAL: NAD, old lady, chronically looks ill, lying in bed comfortably  HEAD:  Atraumatic, Normocephalic  EYES: conjunctiva and sclera clear  CHEST/LUNG: poor inspiratory effort   HEART: Regular rate   ABDOMEN: Soft, Nontender, Nondistended. abd binder in place/PEG  EXTREMITIES:  No cyanosis, or edema  NERVOUS SYSTEM:  Alert & Oriented X2-3

## 2023-02-21 LAB
ALBUMIN SERPL ELPH-MCNC: 3.2 G/DL — LOW (ref 3.5–5.2)
ALP SERPL-CCNC: 163 U/L — HIGH (ref 30–115)
ALT FLD-CCNC: 25 U/L — SIGNIFICANT CHANGE UP (ref 0–41)
ANION GAP SERPL CALC-SCNC: 9 MMOL/L — SIGNIFICANT CHANGE UP (ref 7–14)
AST SERPL-CCNC: 17 U/L — SIGNIFICANT CHANGE UP (ref 0–41)
BASOPHILS # BLD AUTO: 0.06 K/UL — SIGNIFICANT CHANGE UP (ref 0–0.2)
BASOPHILS NFR BLD AUTO: 0.8 % — SIGNIFICANT CHANGE UP (ref 0–1)
BILIRUB SERPL-MCNC: 1 MG/DL — SIGNIFICANT CHANGE UP (ref 0.2–1.2)
BUN SERPL-MCNC: 15 MG/DL — SIGNIFICANT CHANGE UP (ref 10–20)
CALCIUM SERPL-MCNC: 10.6 MG/DL — HIGH (ref 8.4–10.5)
CHLORIDE SERPL-SCNC: 112 MMOL/L — HIGH (ref 98–110)
CO2 SERPL-SCNC: 22 MMOL/L — SIGNIFICANT CHANGE UP (ref 17–32)
CREAT SERPL-MCNC: 0.8 MG/DL — SIGNIFICANT CHANGE UP (ref 0.7–1.5)
EGFR: 79 ML/MIN/1.73M2 — SIGNIFICANT CHANGE UP
EOSINOPHIL # BLD AUTO: 0.43 K/UL — SIGNIFICANT CHANGE UP (ref 0–0.7)
EOSINOPHIL NFR BLD AUTO: 5.8 % — SIGNIFICANT CHANGE UP (ref 0–8)
GLUCOSE BLDC GLUCOMTR-MCNC: 100 MG/DL — HIGH (ref 70–99)
GLUCOSE BLDC GLUCOMTR-MCNC: 111 MG/DL — HIGH (ref 70–99)
GLUCOSE BLDC GLUCOMTR-MCNC: 120 MG/DL — HIGH (ref 70–99)
GLUCOSE BLDC GLUCOMTR-MCNC: 86 MG/DL — SIGNIFICANT CHANGE UP (ref 70–99)
GLUCOSE BLDC GLUCOMTR-MCNC: 93 MG/DL — SIGNIFICANT CHANGE UP (ref 70–99)
GLUCOSE SERPL-MCNC: 106 MG/DL — HIGH (ref 70–99)
HCT VFR BLD CALC: 28.7 % — LOW (ref 37–47)
HGB BLD-MCNC: 9.2 G/DL — LOW (ref 12–16)
IMM GRANULOCYTES NFR BLD AUTO: 0.4 % — HIGH (ref 0.1–0.3)
LYMPHOCYTES # BLD AUTO: 2.1 K/UL — SIGNIFICANT CHANGE UP (ref 1.2–3.4)
LYMPHOCYTES # BLD AUTO: 28.4 % — SIGNIFICANT CHANGE UP (ref 20.5–51.1)
MAGNESIUM SERPL-MCNC: 1.8 MG/DL — SIGNIFICANT CHANGE UP (ref 1.8–2.4)
MCHC RBC-ENTMCNC: 28.9 PG — SIGNIFICANT CHANGE UP (ref 27–31)
MCHC RBC-ENTMCNC: 32.1 G/DL — SIGNIFICANT CHANGE UP (ref 32–37)
MCV RBC AUTO: 90.3 FL — SIGNIFICANT CHANGE UP (ref 81–99)
MONOCYTES # BLD AUTO: 0.66 K/UL — HIGH (ref 0.1–0.6)
MONOCYTES NFR BLD AUTO: 8.9 % — SIGNIFICANT CHANGE UP (ref 1.7–9.3)
NEUTROPHILS # BLD AUTO: 4.12 K/UL — SIGNIFICANT CHANGE UP (ref 1.4–6.5)
NEUTROPHILS NFR BLD AUTO: 55.7 % — SIGNIFICANT CHANGE UP (ref 42.2–75.2)
NIGHT BLUE STAIN TISS: SIGNIFICANT CHANGE UP
NRBC # BLD: 0 /100 WBCS — SIGNIFICANT CHANGE UP (ref 0–0)
PLATELET # BLD AUTO: 153 K/UL — SIGNIFICANT CHANGE UP (ref 130–400)
POTASSIUM SERPL-MCNC: 3.6 MMOL/L — SIGNIFICANT CHANGE UP (ref 3.5–5)
POTASSIUM SERPL-SCNC: 3.6 MMOL/L — SIGNIFICANT CHANGE UP (ref 3.5–5)
PROT SERPL-MCNC: 5.7 G/DL — LOW (ref 6–8)
RBC # BLD: 3.18 M/UL — LOW (ref 4.2–5.4)
RBC # FLD: 13.4 % — SIGNIFICANT CHANGE UP (ref 11.5–14.5)
SODIUM SERPL-SCNC: 143 MMOL/L — SIGNIFICANT CHANGE UP (ref 135–146)
SPECIMEN SOURCE: SIGNIFICANT CHANGE UP
WBC # BLD: 7.4 K/UL — SIGNIFICANT CHANGE UP (ref 4.8–10.8)
WBC # FLD AUTO: 7.4 K/UL — SIGNIFICANT CHANGE UP (ref 4.8–10.8)

## 2023-02-21 PROCEDURE — 99232 SBSQ HOSP IP/OBS MODERATE 35: CPT

## 2023-02-21 RX ADMIN — CHLORHEXIDINE GLUCONATE 1 APPLICATION(S): 213 SOLUTION TOPICAL at 05:49

## 2023-02-21 RX ADMIN — Medication 60 MILLIGRAM(S): at 05:49

## 2023-02-21 RX ADMIN — ENOXAPARIN SODIUM 40 MILLIGRAM(S): 100 INJECTION SUBCUTANEOUS at 17:49

## 2023-02-21 RX ADMIN — PANTOPRAZOLE SODIUM 40 MILLIGRAM(S): 20 TABLET, DELAYED RELEASE ORAL at 05:50

## 2023-02-21 RX ADMIN — PANTOPRAZOLE SODIUM 40 MILLIGRAM(S): 20 TABLET, DELAYED RELEASE ORAL at 17:49

## 2023-02-21 RX ADMIN — LOSARTAN POTASSIUM 25 MILLIGRAM(S): 100 TABLET, FILM COATED ORAL at 05:49

## 2023-02-21 NOTE — PRE-ANESTHESIA EVALUATION ADULT - BSA (M2)
2.21 Mohs Method Verbiage: An incision at a 45 degree angle following the standard Mohs approach was done and the specimen was harvested as a microscopic controlled layer.

## 2023-02-21 NOTE — PROGRESS NOTE ADULT - SUBJECTIVE AND OBJECTIVE BOX
DARCIE PATRICIA  72y, Female  Allergy: No Known Allergies    Hospital Day: 21d    Patient seen and examined earlier today.  No acute events overnight.  reports still having diarrhea.      PMH/PSH:  PAST MEDICAL & SURGICAL HISTORY:  HTN (hypertension)      Diabetes mellitus      Obesity      Gastroesophageal reflux disease      Active asthma      Generalized OA      Afib      H/O hernia repair  2011 and revised in 2013      History of cholecystectomy          LAST 24-Hr EVENTS:    VITALS:  T(F): 97.4 (02-21-23 @ 16:25), Max: 98.2 (02-20-23 @ 16:50)  HR: 77 (02-21-23 @ 16:25)  BP: 167/76 (02-21-23 @ 16:25) (133/62 - 167/76)  RR: 18 (02-21-23 @ 16:25)  SpO2: 99% (02-21-23 @ 16:25)          TESTS & MEASUREMENTS:  Weight/BMI      02-19-23 @ 07:01  -  02-20-23 @ 07:00  --------------------------------------------------------  IN: 780 mL / OUT: 450 mL / NET: 330 mL    02-20-23 @ 07:01  -  02-21-23 @ 07:00  --------------------------------------------------------  IN: 1510 mL / OUT: 0 mL / NET: 1510 mL                            9.2    7.40  )-----------( 153      ( 21 Feb 2023 05:19 )             28.7         02-21    143  |  112<H>  |  15  ----------------------------<  106<H>  3.6   |  22  |  0.8    Ca    10.6<H>      21 Feb 2023 05:19  Mg     1.8     02-21    TPro  5.7<L>  /  Alb  3.2<L>  /  TBili  1.0  /  DBili  x   /  AST  17  /  ALT  25  /  AlkPhos  163<H>  02-21    LIVER FUNCTIONS - ( 21 Feb 2023 05:19 )  Alb: 3.2 g/dL / Pro: 5.7 g/dL / ALK PHOS: 163 U/L / ALT: 25 U/L / AST: 17 U/L / GGT: x                 Culture - Acid Fast - Stool w/Smear (collected 02-19-23 @ 21:50)  Source: .Stool Stool cup                COVID-19 PCR: Detected (02-16-23 @ 15:02)        A1C with Estimated Average Glucose Result: 5.5 % (02-02-23 @ 04:30)          RADIOLOGY, ECG, & ADDITIONAL TESTS:  12 Lead ECG:   Systolic  mmHg    Diastolic BP 65 mmHg    Ventricular Rate 76 BPM    Atrial Rate 147 BPM    QRS Duration 96 ms    Q-T Interval 390 ms    QTC Calculation(Bazett) 438 ms    R Axis 120 degrees    T Axis 84 degrees    Diagnosis Line Atrial fibrillation  Right axis deviation  Possible Right ventricular hypertrophy  Abnormal ECG    Confirmed by MAI RHOADES MD ([id number]) on 2/14/2023 7:00:06 AM (02-13-23 @ 16:30)      RECENT DIAGNOSTIC ORDERS:  GI PCR Panel Stool: STAT (02-21-23 @ 09:55)      MEDICATIONS:  MEDICATIONS  (STANDING):  chlorhexidine 2% Cloths 1 Application(s) Topical daily  enoxaparin Injectable 40 milliGRAM(s) SubCutaneous every 24 hours  ergocalciferol 04820 Unit(s) Oral every 24 hours  hydrochlorothiazide 12.5 milliGRAM(s) Oral daily  insulin lispro (ADMELOG) corrective regimen sliding scale   SubCutaneous every 6 hours  losartan 25 milliGRAM(s) Oral daily  NIFEdipine XL 60 milliGRAM(s) Oral daily  pantoprazole  Injectable 40 milliGRAM(s) IV Push every 12 hours    MEDICATIONS  (PRN):  acetaminophen     Tablet .. 650 milliGRAM(s) Oral every 6 hours PRN Temp greater or equal to 38C (100.4F), Mild Pain (1 - 3), Moderate Pain (4 - 6)      HOME MEDICATIONS:  alendronate 70 mg oral tablet (02-01)  carvedilol 12.5 mg oral tablet (02-01)  cyanocobalamin 1000 mcg oral tablet, extended release (02-01)  labetalol 100 mg oral tablet (11-06)  losartan-hydrochlorothiazide 50 mg-12.5 mg oral tablet (02-01)  magnesium oxide 400 mg oral tablet (02-08)  NIFEdipine 90 mg oral tablet, extended release (02-01)  oxycodone-acetaminophen 5 mg-325 mg oral tablet (11-06)  potassium chloride 10 mEq oral capsule, extended release (11-06)  rivaroxaban 20 mg oral tablet (11-06)  sodium bicarbonate 650 mg oral tablet (02-01)      PHYSICAL EXAM:  GENERAL: NAD, old lady, chronically looks ill, lying in bed comfortably  HEAD:  Atraumatic, Normocephalic  EYES: conjunctiva and sclera clear  CHEST/LUNG: poor inspiratory effort   HEART: Regular rate   ABDOMEN: Soft, Nontender, Nondistended. abd binder in place/PEG  EXTREMITIES:  No cyanosis, or edema  NERVOUS SYSTEM:  Alert & Oriented X2-3

## 2023-02-21 NOTE — PROGRESS NOTE ADULT - ASSESSMENT
73 yo female kth afib, htn, solitary kidney presented from [facility] for SOB.     #Acute hypoxemic resp failure, with ARDS  and Possible CAP and COVID + extubated 2/11   - pt is on RA    #Diarrhea - improving  - C. 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   - outpatient follow-up    #HTN  - resume home losartan and nifedipine     #Dysphagia  #Class2 obesity BMI 38 patient  #hypernatremia resolved  #Hypomagnesemia - resolved  s/p PEG tube - getting feeds through PEG  - see dieitian outpatient for further evaluation     #Mild tricuspid regurgitation  #Right upper lobe pulmonary cyst   #2.4 cm left adrenal gland nodule   - outpatient follow-up    DVT ppx: lovenox  GI ppx: protonix  Diet: TF  Dispo: per attending prepare for Dc in 24hr and before DC discuss discharge plan with daughter 73 yo F origionally admitted to MICU from ED on 1/31 for acute hypoxic resp failure, COVID, pleural effusion, acute renal failure, UTI. Patient was intubated in ED. Patient was septic and finished course of Ceftriaxone, Levofloxacin, Vanco,  Meropenum for ESBL UTI, and Decadron for COVID. NOLA and sepsis resolved. Extubated on 2/7, but she threw a mucous plug, desaturated, and had to be reintubated on 2/9. Again extubated on 2/11, did well on BIPAP, transitioned to nasal cannula, now on RA. Patient did not pass speech and swallow eval, including FEES, so GI was consulted for PEG placement for feeding. The patient was given PEG feed and was noted to tolerate feeds well. Noted to have multiple watery bowel movents. C. Diff negative. Follow-up GI PCR.     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   After feeds tolerated X 2 increase to 240ml X 4/day     #hypernatremia  c/w FW and D5    #Melena  resolved    #Vaginal bleeding  sp biopsy:   Endometrial biopsy:  -Strips of benign inactive endometrial epithelium, no hyperplasia or  atypia seen.  -Mainly mucus material and inflammatory cells present.    #Class2 obesity BMI 38 patient needs to see dieitian outpatient for further evaluation     # Mild tricuspid regurgitation.    #Right upper lobe pulmonary cyst     #2.4 cm left adrenal gland nodule.    #Debility ( post icu debility) max assist continue with PT     #Anemia no indication for transfusion   DVT ppx: lovenox  GI ppx: protonix  Diet: TF  Dispo: per attending prepare for Dc in 24hr and before DC discuss discharge plan with daughter

## 2023-02-21 NOTE — PROGRESS NOTE ADULT - SUBJECTIVE AND OBJECTIVE BOX
LENGTH OF HOSPITAL STAY: 21d    Overnight events: none  Complaints: none    CHIEF COMPLAINT:   Patient is a 72y old  Female who presents with a chief complaint of respiratory distress (21 Feb 2023 16:27)        HISTORY OF PRESENTING ILLNESS:    HPI:  73 yo female kth afib, htn, solitary kidney presented from [facility] for SOB.   As per the family, the patient tested positive for covid 4 days ago and she has been feeling unwell. She was also SOB but she refused to visit the hospital.   Today she was altered and her oxygen dropped to 70s in the NH, in addition to a drop in her bp.   She presented to the ed and was placed on bipap with no improvement so she was intubated.   She tested positive for covid, and her ct scan showed mild right pleural effusion. admitted to MICU called to evaluate (01 Feb 2023 00:39)    PAST MEDICAL & SURGICAL HISTORY  PAST MEDICAL & SURGICAL HISTORY:  HTN (hypertension)      Diabetes mellitus      Obesity      Gastroesophageal reflux disease      Active asthma      Generalized OA      Afib      H/O hernia repair  2011 and revised in 2013      History of cholecystectomy        SOCIAL HISTORY:    ALLERGIES:  No Known Allergies    MEDICATIONS:  STANDING MEDICATIONS  chlorhexidine 2% Cloths 1 Application(s) Topical daily  enoxaparin Injectable 40 milliGRAM(s) SubCutaneous every 24 hours  ergocalciferol 17532 Unit(s) Oral every 24 hours  hydrochlorothiazide 12.5 milliGRAM(s) Oral daily  insulin lispro (ADMELOG) corrective regimen sliding scale   SubCutaneous every 6 hours  losartan 25 milliGRAM(s) Oral daily  NIFEdipine XL 60 milliGRAM(s) Oral daily  pantoprazole  Injectable 40 milliGRAM(s) IV Push every 12 hours    PRN MEDICATIONS  acetaminophen     Tablet .. 650 milliGRAM(s) Oral every 6 hours PRN    VITALS:   T(F): 97.4  HR: 77  BP: 167/76  RR: 18  SpO2: 99%    LABS:                        9.2    7.40  )-----------( 153      ( 21 Feb 2023 05:19 )             28.7     02-21    143  |  112<H>  |  15  ----------------------------<  106<H>  3.6   |  22  |  0.8    Ca    10.6<H>      21 Feb 2023 05:19  Mg     1.8     02-21    TPro  5.7<L>  /  Alb  3.2<L>  /  TBili  1.0  /  DBili  x   /  AST  17  /  ALT  25  /  AlkPhos  163<H>  02-21              Culture - Acid Fast - Stool w/Smear (collected 19 Feb 2023 21:50)  Source: .Stool Stool cup            PHYSICAL EXAM:  GEN: No acute distress  LUNGS: Normal respiratory effort.  on RA  HEART: S1/S2 present. RRR. no murmurs  ABD: Soft, non-tender, non-distended  EXT: no cyanosis or edema  NEURO: no focal deficits

## 2023-02-21 NOTE — PROGRESS NOTE ADULT - ASSESSMENT
71 yo female kth afib, htn, solitary kidney presented from [facility] for SOB.     #Diarrhea - improving  multiple watery bowel movements   C. Diff, f/u GI PCR       #Acute hypoxemic resp failure, with ARDS  and Possible CAP and  COVID +  extubated 2/11   now on nasal cannula saturating well ,  titrate oxygen as tolerated - remove NC    #ESBL UTI  and G+C bacteremia/ COAG - STAP  sp meropenum     #NOLA  likely ATN vs prerenal improving on CKD 2 with congenital unilateral kidney   resolved     #H/O A Fib  rate controlled   AC held given melena and vaginal bleeding, hg is stable      #Low TSH; 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     #HTN  resume home losartan and nifedipine 60mg qd       #Dysphagia  s/p PEG tube - getting feeds through PEG  #diarrhea - C. Diff negative, GI PCR pending      #hypernatremia resolved    #Hypomagnesemia - resolved    #Melena  resolved    #Vaginal bleeding  sp biopsy today - showing benign endometrium. outpt f/u     #Class2 obesity BMI 38 patient needs to see dieitian outpatient for further evaluation     #Mild tricuspid regurgitation    #Right upper lobe pulmonary cyst     #2.4 cm left adrenal gland nodule     #Debility ( post icu debility) max assist continue with PT     #Anemia no indication for transfusion     resume dvt ppx lovenox     PROGRESS NOTE HANDOFF    Pending: starting and titrating tube feeds, GI PCR,   Dispo: prepared for Dc 24hr, Discussed with daughter at bedside - before DC daughter would like to be informed and would like to go over Dc plan with physician

## 2023-02-22 LAB
ALBUMIN SERPL ELPH-MCNC: 3.4 G/DL — LOW (ref 3.5–5.2)
ALP SERPL-CCNC: 183 U/L — HIGH (ref 30–115)
ALT FLD-CCNC: 31 U/L — SIGNIFICANT CHANGE UP (ref 0–41)
ANION GAP SERPL CALC-SCNC: 9 MMOL/L — SIGNIFICANT CHANGE UP (ref 7–14)
AST SERPL-CCNC: 22 U/L — SIGNIFICANT CHANGE UP (ref 0–41)
BASOPHILS # BLD AUTO: 0.08 K/UL — SIGNIFICANT CHANGE UP (ref 0–0.2)
BASOPHILS NFR BLD AUTO: 1 % — SIGNIFICANT CHANGE UP (ref 0–1)
BILIRUB SERPL-MCNC: 1.2 MG/DL — SIGNIFICANT CHANGE UP (ref 0.2–1.2)
BUN SERPL-MCNC: 18 MG/DL — SIGNIFICANT CHANGE UP (ref 10–20)
CALCIUM SERPL-MCNC: 10.7 MG/DL — HIGH (ref 8.4–10.4)
CHLORIDE SERPL-SCNC: 109 MMOL/L — SIGNIFICANT CHANGE UP (ref 98–110)
CO2 SERPL-SCNC: 21 MMOL/L — SIGNIFICANT CHANGE UP (ref 17–32)
CREAT SERPL-MCNC: 1 MG/DL — SIGNIFICANT CHANGE UP (ref 0.7–1.5)
EGFR: 61 ML/MIN/1.73M2 — SIGNIFICANT CHANGE UP
EOSINOPHIL # BLD AUTO: 0.5 K/UL — SIGNIFICANT CHANGE UP (ref 0–0.7)
EOSINOPHIL NFR BLD AUTO: 6.4 % — SIGNIFICANT CHANGE UP (ref 0–8)
GI PCR PANEL: SIGNIFICANT CHANGE UP
GLUCOSE BLDC GLUCOMTR-MCNC: 101 MG/DL — HIGH (ref 70–99)
GLUCOSE BLDC GLUCOMTR-MCNC: 112 MG/DL — HIGH (ref 70–99)
GLUCOSE BLDC GLUCOMTR-MCNC: 87 MG/DL — SIGNIFICANT CHANGE UP (ref 70–99)
GLUCOSE BLDC GLUCOMTR-MCNC: 89 MG/DL — SIGNIFICANT CHANGE UP (ref 70–99)
GLUCOSE BLDC GLUCOMTR-MCNC: 99 MG/DL — SIGNIFICANT CHANGE UP (ref 70–99)
GLUCOSE SERPL-MCNC: 104 MG/DL — HIGH (ref 70–99)
HCT VFR BLD CALC: 31.1 % — LOW (ref 37–47)
HGB BLD-MCNC: 9.9 G/DL — LOW (ref 12–16)
IMM GRANULOCYTES NFR BLD AUTO: 0.5 % — HIGH (ref 0.1–0.3)
LYMPHOCYTES # BLD AUTO: 2.27 K/UL — SIGNIFICANT CHANGE UP (ref 1.2–3.4)
LYMPHOCYTES # BLD AUTO: 28.8 % — SIGNIFICANT CHANGE UP (ref 20.5–51.1)
MAGNESIUM SERPL-MCNC: 1.5 MG/DL — LOW (ref 1.8–2.4)
MCHC RBC-ENTMCNC: 28.5 PG — SIGNIFICANT CHANGE UP (ref 27–31)
MCHC RBC-ENTMCNC: 31.8 G/DL — LOW (ref 32–37)
MCV RBC AUTO: 89.6 FL — SIGNIFICANT CHANGE UP (ref 81–99)
MONOCYTES # BLD AUTO: 0.69 K/UL — HIGH (ref 0.1–0.6)
MONOCYTES NFR BLD AUTO: 8.8 % — SIGNIFICANT CHANGE UP (ref 1.7–9.3)
NEUTROPHILS # BLD AUTO: 4.29 K/UL — SIGNIFICANT CHANGE UP (ref 1.4–6.5)
NEUTROPHILS NFR BLD AUTO: 54.5 % — SIGNIFICANT CHANGE UP (ref 42.2–75.2)
NRBC # BLD: 0 /100 WBCS — SIGNIFICANT CHANGE UP (ref 0–0)
PLATELET # BLD AUTO: 107 K/UL — LOW (ref 130–400)
POTASSIUM SERPL-MCNC: 3.3 MMOL/L — LOW (ref 3.5–5)
POTASSIUM SERPL-SCNC: 3.3 MMOL/L — LOW (ref 3.5–5)
PROT SERPL-MCNC: 6.1 G/DL — SIGNIFICANT CHANGE UP (ref 6–8)
RBC # BLD: 3.47 M/UL — LOW (ref 4.2–5.4)
RBC # FLD: 13.7 % — SIGNIFICANT CHANGE UP (ref 11.5–14.5)
SODIUM SERPL-SCNC: 139 MMOL/L — SIGNIFICANT CHANGE UP (ref 135–146)
WBC # BLD: 7.87 K/UL — SIGNIFICANT CHANGE UP (ref 4.8–10.8)
WBC # FLD AUTO: 7.87 K/UL — SIGNIFICANT CHANGE UP (ref 4.8–10.8)

## 2023-02-22 PROCEDURE — 99233 SBSQ HOSP IP/OBS HIGH 50: CPT

## 2023-02-22 RX ORDER — LOPERAMIDE HCL 2 MG
4 TABLET ORAL ONCE
Refills: 0 | Status: COMPLETED | OUTPATIENT
Start: 2023-02-22 | End: 2023-02-22

## 2023-02-22 RX ORDER — SACCHAROMYCES BOULARDII 250 MG
250 POWDER IN PACKET (EA) ORAL
Refills: 0 | Status: DISCONTINUED | OUTPATIENT
Start: 2023-02-22 | End: 2023-02-24

## 2023-02-22 RX ORDER — ENOXAPARIN SODIUM 100 MG/ML
110 INJECTION SUBCUTANEOUS EVERY 12 HOURS
Refills: 0 | Status: DISCONTINUED | OUTPATIENT
Start: 2023-02-22 | End: 2023-02-23

## 2023-02-22 RX ORDER — POTASSIUM CHLORIDE 20 MEQ
40 PACKET (EA) ORAL EVERY 4 HOURS
Refills: 0 | Status: COMPLETED | OUTPATIENT
Start: 2023-02-22 | End: 2023-02-22

## 2023-02-22 RX ORDER — LOPERAMIDE HCL 2 MG
4 TABLET ORAL DAILY
Refills: 0 | Status: DISCONTINUED | OUTPATIENT
Start: 2023-02-22 | End: 2023-02-22

## 2023-02-22 RX ORDER — PANTOPRAZOLE SODIUM 20 MG/1
40 TABLET, DELAYED RELEASE ORAL EVERY 12 HOURS
Refills: 0 | Status: DISCONTINUED | OUTPATIENT
Start: 2023-02-22 | End: 2023-02-25

## 2023-02-22 RX ORDER — CHOLECALCIFEROL (VITAMIN D3) 125 MCG
400 CAPSULE ORAL
Qty: 0 | Refills: 0 | DISCHARGE
Start: 2023-02-22

## 2023-02-22 RX ORDER — LOPERAMIDE HCL 2 MG
2 TABLET ORAL
Qty: 0 | Refills: 0 | DISCHARGE
Start: 2023-02-22

## 2023-02-22 RX ORDER — CHOLECALCIFEROL (VITAMIN D3) 125 MCG
400 CAPSULE ORAL
Refills: 0 | Status: DISCONTINUED | OUTPATIENT
Start: 2023-02-22 | End: 2023-02-25

## 2023-02-22 RX ORDER — SACCHAROMYCES BOULARDII 250 MG
1 POWDER IN PACKET (EA) ORAL
Qty: 0 | Refills: 0 | DISCHARGE
Start: 2023-02-22

## 2023-02-22 RX ORDER — MAGNESIUM SULFATE 500 MG/ML
2 VIAL (ML) INJECTION EVERY 4 HOURS
Refills: 0 | Status: COMPLETED | OUTPATIENT
Start: 2023-02-22 | End: 2023-02-22

## 2023-02-22 RX ADMIN — Medication 25 GRAM(S): at 08:23

## 2023-02-22 RX ADMIN — LOSARTAN POTASSIUM 25 MILLIGRAM(S): 100 TABLET, FILM COATED ORAL at 05:45

## 2023-02-22 RX ADMIN — CHLORHEXIDINE GLUCONATE 1 APPLICATION(S): 213 SOLUTION TOPICAL at 23:17

## 2023-02-22 RX ADMIN — PANTOPRAZOLE SODIUM 40 MILLIGRAM(S): 20 TABLET, DELAYED RELEASE ORAL at 05:46

## 2023-02-22 RX ADMIN — Medication 60 MILLIGRAM(S): at 05:46

## 2023-02-22 RX ADMIN — Medication 40 MILLIEQUIVALENT(S): at 10:44

## 2023-02-22 RX ADMIN — ERGOCALCIFEROL 50000 UNIT(S): 1.25 CAPSULE ORAL at 08:23

## 2023-02-22 RX ADMIN — ENOXAPARIN SODIUM 40 MILLIGRAM(S): 100 INJECTION SUBCUTANEOUS at 15:49

## 2023-02-22 RX ADMIN — Medication 25 GRAM(S): at 10:44

## 2023-02-22 RX ADMIN — PANTOPRAZOLE SODIUM 40 MILLIGRAM(S): 20 TABLET, DELAYED RELEASE ORAL at 17:17

## 2023-02-22 RX ADMIN — Medication 40 MILLIEQUIVALENT(S): at 08:23

## 2023-02-22 RX ADMIN — Medication 4 MILLIGRAM(S): at 17:17

## 2023-02-22 NOTE — PROGRESS NOTE ADULT - SUBJECTIVE AND OBJECTIVE BOX
LENGTH OF HOSPITAL STAY: 22d    Overnight events: none  Complaints: none    CHIEF COMPLAINT:   Patient is a 72y old  Female who presents with a chief complaint of respiratory distress (21 Feb 2023 16:36)        HISTORY OF PRESENTING ILLNESS:    HPI:  73 yo female kth afib, htn, solitary kidney presented from [facility] for SOB.   As per the family, the patient tested positive for covid 4 days ago and she has been feeling unwell. She was also SOB but she refused to visit the hospital.   Today she was altered and her oxygen dropped to 70s in the NH, in addition to a drop in her bp.   She presented to the ed and was placed on bipap with no improvement so she was intubated.   She tested positive for covid, and her ct scan showed mild right pleural effusion. admitted to MICU called to evaluate (01 Feb 2023 00:39)    PAST MEDICAL & SURGICAL HISTORY  PAST MEDICAL & SURGICAL HISTORY:  HTN (hypertension)      Diabetes mellitus      Obesity      Gastroesophageal reflux disease      Active asthma      Generalized OA      Afib      H/O hernia repair  2011 and revised in 2013      History of cholecystectomy        SOCIAL HISTORY:    ALLERGIES:  No Known Allergies    MEDICATIONS:  STANDING MEDICATIONS  chlorhexidine 2% Cloths 1 Application(s) Topical daily  cholecalciferol 400 Unit(s) Oral two times a day  enoxaparin Injectable 40 milliGRAM(s) SubCutaneous every 24 hours  hydrochlorothiazide 12.5 milliGRAM(s) Oral daily  insulin lispro (ADMELOG) corrective regimen sliding scale   SubCutaneous every 6 hours  loperamide 4 milliGRAM(s) Oral once  losartan 25 milliGRAM(s) Oral daily  NIFEdipine XL 60 milliGRAM(s) Oral daily  pantoprazole    Tablet 40 milliGRAM(s) Oral every 12 hours  saccharomyces boulardii 250 milliGRAM(s) Oral two times a day    PRN MEDICATIONS  acetaminophen     Tablet .. 650 milliGRAM(s) Oral every 6 hours PRN    VITALS:   T(F): 96.8  HR: 74  BP: 161/73  RR: 18  SpO2: 97%    LABS:                        9.9    7.87  )-----------( 107      ( 22 Feb 2023 06:38 )             31.1     02-22    139  |  109  |  18  ----------------------------<  104<H>  3.3<L>   |  21  |  1.0    Ca    10.7<H>      22 Feb 2023 06:38  Mg     1.5     02-22    TPro  6.1  /  Alb  3.4<L>  /  TBili  1.2  /  DBili  x   /  AST  22  /  ALT  31  /  AlkPhos  183<H>  02-22              Culture - Acid Fast - Stool w/Smear (collected 19 Feb 2023 21:50)  Source: .Stool Stool cup            PHYSICAL EXAM:  GEN: No acute distress  LUNGS: Normal respiratory effort.  on RA  HEART: S1/S2 present. RRR. no murmurs  ABD: Soft, non-tender, non-distended  EXT: no cyanosis or edema  NEURO: no focal deficits

## 2023-02-22 NOTE — PROGRESS NOTE ADULT - ASSESSMENT
73 yo female kth afib, htn, solitary kidney presented from [facility] for SOB.     #Diarrhea - no improvement  -multiple watery bowel movements   -C. Diff and GI PCR negative  -try imodium prior to next feed    #Acute hypoxemic resp failure, with ARDS  and Possible CAP and  COVID - resolved   -extubated 2/11   -now on room air saturating well      #ESBL UTI  and G+C bacteremia/ COAG - STAP  -sp meropenum     #NOLA  likely ATN vs prerenal on CKD 2 with congenital unilateral kidney   -resolved     #H/O A Fib  -rate controlled   -had discussion with patient regarding risks and benefits of starting a/c; she says since she has not had bleeding and Hb is stable, she wants to go back on a/c. Will do LVX 110mg sq bid and hope to transfer to [facility] on d/c     #Low TSH; 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     #HTN  resume home losartan and nifedipine 60mg qd       #Dysphagia  -s/p PEG tube - getting feeds through PEG    #hypernatremia resolved    #Hypomagnesemia - resolved    #Melena   -resolved    #Vaginal bleeding- resolved   -sp biopsy - showing benign endometrium. outpt f/u      #Class2 obesity BMI 38 patient needs to see dietitian outpatient for further evaluation     #Mild tricuspid regurgitation    #Right upper lobe pulmonary cyst     #2.4 cm left adrenal gland nodule     #Debility ( post icu debility) max assist continue with PT     #Anemia no indication for transfusion     resume therapeutic LVX      PROGRESS NOTE HANDOFF    Pending: resolution of diarrhea, restarting a/c   Dispo: Tried calling daughter Rabia at [phone], no answer. Will attempt again tomorrow. If diarrhea resolves can d/c.  73 yo female kth afib, htn, solitary kidney presented from [facility] for SOB.     #Diarrhea - no improvement  -multiple watery bowel movements   -C. Diff and GI PCR negative  -try imodium prior to next feed    #Acute hypoxemic resp failure, with ARDS  and Possible CAP and  COVID - resolved   -extubated 2/11   -now on room air saturating well      #ESBL UTI  and G+C bacteremia/ COAG - STAP  -sp meropenum     #NOLA  likely ATN vs prerenal on CKD 2 with congenital unilateral kidney   -resolved     #H/O A Fib  -rate controlled   -had discussion with patient regarding risks and benefits of starting a/c, including worsening GI or  bleeding, bleeding in general, etc. vs stroke risk by being off a/c (CHADSVASC 3); she says since she has not had bleeding and Hb is stable, she wants to go back on a/c. Will do LVX 110mg sq bid and hope to transfer to [facility] on d/c     #Low TSH; 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     #HTN  resume home losartan and nifedipine 60mg qd       #Dysphagia  -s/p PEG tube - getting feeds through PEG    #hypernatremia resolved    #Hypomagnesemia - resolved    #Melena   -resolved    #Vaginal bleeding- resolved   -sp biopsy - showing benign endometrium. outpt f/u      #Class2 obesity BMI 38 patient needs to see dietitian outpatient for further evaluation     #Mild tricuspid regurgitation    #Right upper lobe pulmonary cyst     #2.4 cm left adrenal gland nodule     #Debility ( post icu debility) max assist continue with PT     #Anemia no indication for transfusion     resume therapeutic LVX      PROGRESS NOTE HANDOFF    Pending: resolution of diarrhea, restarting a/c   Dispo: Tried calling daughter Rabia at [phone], no answer. Will attempt again tomorrow. If diarrhea resolves can d/c.

## 2023-02-22 NOTE — PROGRESS NOTE ADULT - ASSESSMENT
ASSESSMENT  Acute hypoxemic resp failure, with ARDS  and Possible CAP and  COVID +  extubated 2/11   on N/C  dysphagia s/p peg tube placement   Diarrhea   C. Diff and GI PCR negative  - started immodium  NOLA  likely ATN vs prerenal improving on CKD 2 with congenital unilateral kidney   resolved   H/O A Fib  HTN   obesity BMI 38   Mild tricuspid regurgitation.  Right upper lobe pulmonary cyst   2.4 cm left adrenal gland nodule.   ASSESSMENT  Acute hypoxemic resp failure, with ARDS  and Possible CAP and  COVID +  extubated 2/11   on N/C  dysphagia s/p peg tube placement   Diarrhea   C. Diff and GI PCR negative  - started immodium  NOLA  likely ATN vs prerenal improving on CKD 2 with congenital unilateral kidney   resolved   H/O A Fib  HTN   obesity BMI 38   PLAN  pt to go to a NH upon discharge as per discharge planner  on peptamen 1.5   +diarrhea now  started Imodium  will change to JEVITY 1.2 at 360mlx 4 feeds  suggest starting Florastor   check bmp/phos/mg and correct lytes

## 2023-02-22 NOTE — PROGRESS NOTE ADULT - SUBJECTIVE AND OBJECTIVE BOX
NUTRITION SUPPORT TEAM  -  PROGRESS NOTE   on peg tube feed   +diarrhea  spoke with medical team    REVIEW OF SYSTEMS:  Negative except as noted above.     VITALS:  T(F): 96.8 (02-22 @ 08:37), Max: 97.5 (02-22 @ 05:00)  HR: 74 (02-22 @ 08:37) (72 - 74)  BP: 161/73 (02-22 @ 05:00) (161/73 - 161/73)  RR: 18 (02-22 @ 08:37) (18 - 18)  SpO2: 97% (02-22 @ 08:37) (97% - 97%)    HEIGHT/WEIGHT/BMI:   Height (cm): 167.6 (02-16)  Weight (kg): 109.1 (02-16)  BMI (kg/m2): 38.8 (02-16)    02-21-23 @ 07:01  -  02-22-23 @ 07:00  --------------------------------------------------------  IN:    Enteral Tube Flush: 300 mL    Peptamen Intense VHP: 480 mL  Total IN: 780 mL    OUT:  Total OUT: 0 mL    Total NET: 780 mL        STANDING MEDICATIONS:   chlorhexidine 2% Cloths 1 Application(s) Topical daily  cholecalciferol 400 Unit(s) Oral two times a day  enoxaparin Injectable 40 milliGRAM(s) SubCutaneous every 24 hours  hydrochlorothiazide 12.5 milliGRAM(s) Oral daily  insulin lispro (ADMELOG) corrective regimen sliding scale   SubCutaneous every 6 hours  loperamide 4 milliGRAM(s) Oral once  losartan 25 milliGRAM(s) Oral daily  NIFEdipine XL 60 milliGRAM(s) Oral daily  pantoprazole    Tablet 40 milliGRAM(s) Oral every 12 hours  saccharomyces boulardii 250 milliGRAM(s) Oral two times a day      LABS:                         9.9    7.87  )-----------( 107      ( 22 Feb 2023 06:38 )             31.1     139  |  109  |  18  ----------------------------<  104<H>          (02-22-23 @ 06:38)  3.3<L>   |  21  |  1.0    Ca    10.7<H>          (02-22-23 @ 06:38)  Mg     1.5         (02-22-23 @ 06:38)    TPro  6.1  /  Alb  3.4<L>  /  TBili  1.2  /  DBili  x   /  AST  22  /  ALT  31  /  AlkPhos  183<H>       02-22-23 @ 06:38      Triglycerides, Serum:   Prealbumin, Serum:   Vitamin D, 25-Hydroxy: <6 ng/mL (02-04 @ 20:22)  Blood Glucose (Past 24 hours):  112 mg/dL (02-22 @ 11:33)  99 mg/dL (02-22 @ 06:16)  101 mg/dL (02-21 @ 23:38)  93 mg/dL (02-21 @ 17:00)    DIET:   Diet, NPO with Tube Feed:   Tube Feeding Modality: Gastrostomy  Peptamen 1.5 (not AF)  Total Volume for 24 Hours (mL): 960  Bolus  Total Volume of Bolus (mL):  240  Total # of Feeds: 4  Tube Feed Frequency: Every 6 hours   Tube Feed Start Time: 12:30  Bolus Feed Rate (mL per Hour): 480   Bolus Feed Duration (in Hours): 0.5   Frequency: Every Hour  Free Water Flush Instructions:  Flush feeds with 50ml H20 before and 100ml H20 after each feed (02-19-23 @ 12:29) [Active]    RADIOLOGY:   < from: US Transvaginal (02.13.23 @ 15:24) >  IMPRESSION:  Endometrium:abnormally thickened in postmenopausal patient, 0.9 cm on   transvaginal examination. Considerations include endometrial hyperplasia   and neoplasia.  Calcified 2 cm fibroid.  Post right oophorectomy, per history.  Left ovary not delineated, likely atrophic.

## 2023-02-22 NOTE — PROGRESS NOTE ADULT - SUBJECTIVE AND OBJECTIVE BOX
PATRICIA SPRAGUE  [facility] 4C 031 A ([facility] 4C)      Patient is a 72y old  Female who presents with a chief complaint of respiratory distress (22 Feb 2023 15:26)        Interval events:  Patient seen and examined at bedside. Says she has had multiple episodes of diarrhea still. No abd pain or fevers. C diff and GI pcr negative. No sob.     -PMHx: HTN (hypertension)    Diabetes mellitus    Obesity    Gastroesophageal reflux disease    Active asthma    Generalized OA    Afib      -PSHx:H/O hernia repair    History of cholecystectomy            REVIEW OF SYSTEMS:  CONSTITUTIONAL: No fever, weight loss, or fatigue  RESPIRATORY: No cough, wheezing, chills or hemoptysis; No shortness of breath  CARDIOVASCULAR: No chest pain, palpitations, dizziness, or leg swelling  GASTROINTESTINAL: +diarrhea   NEUROLOGICAL: No headaches  LYMPH NODES: No enlarged glands  MUSCULOSKELETAL: No joint pain or swelling; No muscle, back, or extremity pain      T(C): , Max: 36.6 (02-22-23 @ 00:14)  HR: 74 (02-22-23 @ 08:37)  BP: 161/73 (02-22-23 @ 05:00)  RR: 18 (02-22-23 @ 08:37)  SpO2: 97% (02-22-23 @ 08:37)  CAPILLARY BLOOD GLUCOSE      POCT Blood Glucose.: 89 mg/dL (22 Feb 2023 16:27)  POCT Blood Glucose.: 112 mg/dL (22 Feb 2023 11:33)  POCT Blood Glucose.: 99 mg/dL (22 Feb 2023 06:16)  POCT Blood Glucose.: 101 mg/dL (21 Feb 2023 23:38)      PHYSICAL EXAM:  GENERAL: NAD, elderly female, chronically looks ill, lying in bed comfortably  HEAD:  Atraumatic, Normocephalic  EYES: conjunctiva and sclera clear  CHEST/LUNG: poor inspiratory effort   HEART: Regular rate   ABDOMEN: Soft, Nontender, Nondistended. abd binder in place/PEG  EXTREMITIES:  No cyanosis, or edema  NERVOUS SYSTEM:  Alert & Oriented X3       LABS:           9.9  7.87  )-------(107          31.1  N=54.5  L=28.8  MCV=89.6    139|109|18  ------------------<104<H>  3.3<L>|21|1.0  eGFR:--  Ca:10.7<H>            Microbiology:    Culture - Acid Fast - Stool w/Smear (collected 02-19-23 @ 21:50)  Source: .Stool Stool cup  Preliminary Report (02-22-23 @ 15:08):    Culture is being performed.        RADIOLOGY & ADDITIONAL TESTS:        Medications:  acetaminophen     Tablet .. 650 milliGRAM(s) Oral every 6 hours PRN  chlorhexidine 2% Cloths 1 Application(s) Topical daily  cholecalciferol 400 Unit(s) Oral two times a day  enoxaparin Injectable 40 milliGRAM(s) SubCutaneous every 24 hours  hydrochlorothiazide 12.5 milliGRAM(s) Oral daily  insulin lispro (ADMELOG) corrective regimen sliding scale   SubCutaneous every 6 hours  losartan 25 milliGRAM(s) Oral daily  NIFEdipine XL 60 milliGRAM(s) Oral daily  pantoprazole    Tablet 40 milliGRAM(s) Oral every 12 hours  saccharomyces boulardii 250 milliGRAM(s) Oral two times a day

## 2023-02-22 NOTE — PROGRESS NOTE ADULT - ASSESSMENT
71 yo F origionally admitted to MICU from ED on 1/31 for acute hypoxic resp failure, COVID, pleural effusion, acute renal failure, UTI. Patient was intubated in ED. Patient was septic and finished course of Ceftriaxone, Levofloxacin, Vanco,  Meropenum for ESBL UTI, and Decadron for COVID. NOLA and sepsis resolved. Extubated on 2/7, but she threw a mucous plug, desaturated, and had to be reintubated on 2/9. Again extubated on 2/11, did well on BIPAP, transitioned to nasal cannula, now on RA. Patient did not pass speech and swallow eval, including FEES, so GI was consulted for PEG placement for feeding. The patient was given PEG feed and was noted to tolerate feeds well. Noted to have multiple watery bowel movents. C. Diff and GI PCR negative.    #Acute hypoxemic resp failure, with ARDS  and Possible CAP and  COVID +  extubated 2/11   now on nasal cannula saturating well ,  titrate oxygen as tolerated     #Diarrhea  - pt having watery bowel movement  - C. Diff and GI PCR negative  - started immodium    #ESBL UTI  and G+C bacteremia/ COAG - STAP  sp meropenum     #NOLA  likely ATN vs prerenal improving on CKD 2 with congenital unilateral kidney   resolved     #H/O A Fib  rate controlled   AC held given melena and vaginal bleeding, hbg is stable    - plan to restart home xarelto for discharge      #Low TSH;   -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     #HTN  - resume home losartan and nifedipine 60mg qd     #Dysphagia  s/p PEG tube   Jevity 1.2 120ml infused over 30 min via gravity drip X 4 feeds/day.   After feeds tolerated X 2 increase to 240ml X 4/day     #hypernatremia  c/w FW and D5    #Melena  resolved    #Vaginal bleeding s/p biopsy:   Endometrial biopsy showed Strips of benign inactive endometrial epithelium, no hyperplasia or  atypia seen. Mainly mucus material and inflammatory cells present.    #Class2 obesity BMI 38 patient needs to see dietician outpatient for further evaluation     # Mild tricuspid regurgitation.    #Right upper lobe pulmonary cyst     #2.4 cm left adrenal gland nodule.    #Debility ( post icu debility) max assist continue with PT     #Anemia no indication for transfusion   DVT ppx: lovenox  GI ppx: protonix  Diet: TF  Dispo: possible DC today or tomorrow

## 2023-02-22 NOTE — PROVIDER CONTACT NOTE (OTHER) - ASSESSMENT
Patient complaining that IV was bothering her, IV would not flush. Patient refused to allow me to try to put a new IV access. She stated she has had to many already and wanted a midline placed.

## 2023-02-23 LAB
ALBUMIN SERPL ELPH-MCNC: 3.5 G/DL — SIGNIFICANT CHANGE UP (ref 3.5–5.2)
ALP SERPL-CCNC: 193 U/L — HIGH (ref 30–115)
ALT FLD-CCNC: 37 U/L — SIGNIFICANT CHANGE UP (ref 0–41)
ANION GAP SERPL CALC-SCNC: 11 MMOL/L — SIGNIFICANT CHANGE UP (ref 7–14)
AST SERPL-CCNC: 25 U/L — SIGNIFICANT CHANGE UP (ref 0–41)
BASOPHILS # BLD AUTO: 0.06 K/UL — SIGNIFICANT CHANGE UP (ref 0–0.2)
BASOPHILS NFR BLD AUTO: 0.8 % — SIGNIFICANT CHANGE UP (ref 0–1)
BILIRUB SERPL-MCNC: 1.4 MG/DL — HIGH (ref 0.2–1.2)
BUN SERPL-MCNC: 21 MG/DL — HIGH (ref 10–20)
CALCIUM SERPL-MCNC: 10.8 MG/DL — HIGH (ref 8.4–10.5)
CHLORIDE SERPL-SCNC: 115 MMOL/L — HIGH (ref 98–110)
CO2 SERPL-SCNC: 19 MMOL/L — SIGNIFICANT CHANGE UP (ref 17–32)
CREAT SERPL-MCNC: 1 MG/DL — SIGNIFICANT CHANGE UP (ref 0.7–1.5)
EGFR: 61 ML/MIN/1.73M2 — SIGNIFICANT CHANGE UP
EOSINOPHIL # BLD AUTO: 0.54 K/UL — SIGNIFICANT CHANGE UP (ref 0–0.7)
EOSINOPHIL NFR BLD AUTO: 6.8 % — SIGNIFICANT CHANGE UP (ref 0–8)
GLUCOSE BLDC GLUCOMTR-MCNC: 87 MG/DL — SIGNIFICANT CHANGE UP (ref 70–99)
GLUCOSE BLDC GLUCOMTR-MCNC: 92 MG/DL — SIGNIFICANT CHANGE UP (ref 70–99)
GLUCOSE BLDC GLUCOMTR-MCNC: 94 MG/DL — SIGNIFICANT CHANGE UP (ref 70–99)
GLUCOSE BLDC GLUCOMTR-MCNC: 97 MG/DL — SIGNIFICANT CHANGE UP (ref 70–99)
GLUCOSE SERPL-MCNC: 89 MG/DL — SIGNIFICANT CHANGE UP (ref 70–99)
HCT VFR BLD CALC: 32.2 % — LOW (ref 37–47)
HGB BLD-MCNC: 10.3 G/DL — LOW (ref 12–16)
IMM GRANULOCYTES NFR BLD AUTO: 0.4 % — HIGH (ref 0.1–0.3)
LYMPHOCYTES # BLD AUTO: 2.23 K/UL — SIGNIFICANT CHANGE UP (ref 1.2–3.4)
LYMPHOCYTES # BLD AUTO: 27.9 % — SIGNIFICANT CHANGE UP (ref 20.5–51.1)
MAGNESIUM SERPL-MCNC: 1.8 MG/DL — SIGNIFICANT CHANGE UP (ref 1.8–2.4)
MCHC RBC-ENTMCNC: 29.1 PG — SIGNIFICANT CHANGE UP (ref 27–31)
MCHC RBC-ENTMCNC: 32 G/DL — SIGNIFICANT CHANGE UP (ref 32–37)
MCV RBC AUTO: 91 FL — SIGNIFICANT CHANGE UP (ref 81–99)
MONOCYTES # BLD AUTO: 0.62 K/UL — HIGH (ref 0.1–0.6)
MONOCYTES NFR BLD AUTO: 7.8 % — SIGNIFICANT CHANGE UP (ref 1.7–9.3)
NEUTROPHILS # BLD AUTO: 4.52 K/UL — SIGNIFICANT CHANGE UP (ref 1.4–6.5)
NEUTROPHILS NFR BLD AUTO: 56.3 % — SIGNIFICANT CHANGE UP (ref 42.2–75.2)
NRBC # BLD: 0 /100 WBCS — SIGNIFICANT CHANGE UP (ref 0–0)
PLATELET # BLD AUTO: 204 K/UL — SIGNIFICANT CHANGE UP (ref 130–400)
POTASSIUM SERPL-MCNC: 3.8 MMOL/L — SIGNIFICANT CHANGE UP (ref 3.5–5)
POTASSIUM SERPL-SCNC: 3.8 MMOL/L — SIGNIFICANT CHANGE UP (ref 3.5–5)
PROT SERPL-MCNC: 6.1 G/DL — SIGNIFICANT CHANGE UP (ref 6–8)
RBC # BLD: 3.54 M/UL — LOW (ref 4.2–5.4)
RBC # FLD: 13.8 % — SIGNIFICANT CHANGE UP (ref 11.5–14.5)
SODIUM SERPL-SCNC: 145 MMOL/L — SIGNIFICANT CHANGE UP (ref 135–146)
WBC # BLD: 8 K/UL — SIGNIFICANT CHANGE UP (ref 4.8–10.8)
WBC # FLD AUTO: 8 K/UL — SIGNIFICANT CHANGE UP (ref 4.8–10.8)

## 2023-02-23 PROCEDURE — 99233 SBSQ HOSP IP/OBS HIGH 50: CPT

## 2023-02-23 RX ORDER — LOPERAMIDE HCL 2 MG
2 TABLET ORAL EVERY 8 HOURS
Refills: 0 | Status: DISCONTINUED | OUTPATIENT
Start: 2023-02-23 | End: 2023-02-23

## 2023-02-23 RX ORDER — LOPERAMIDE HCL 2 MG
2 TABLET ORAL EVERY 6 HOURS
Refills: 0 | Status: DISCONTINUED | OUTPATIENT
Start: 2023-02-23 | End: 2023-02-25

## 2023-02-23 RX ORDER — RIVAROXABAN 15 MG-20MG
20 KIT ORAL
Refills: 0 | Status: DISCONTINUED | OUTPATIENT
Start: 2023-02-23 | End: 2023-02-25

## 2023-02-23 RX ADMIN — Medication 2 MILLIGRAM(S): at 23:45

## 2023-02-23 RX ADMIN — Medication 400 UNIT(S): at 05:31

## 2023-02-23 RX ADMIN — Medication 400 UNIT(S): at 18:42

## 2023-02-23 RX ADMIN — LOSARTAN POTASSIUM 25 MILLIGRAM(S): 100 TABLET, FILM COATED ORAL at 05:29

## 2023-02-23 RX ADMIN — Medication 2 MILLIGRAM(S): at 12:41

## 2023-02-23 RX ADMIN — RIVAROXABAN 20 MILLIGRAM(S): KIT at 18:42

## 2023-02-23 RX ADMIN — Medication 2 MILLIGRAM(S): at 18:42

## 2023-02-23 RX ADMIN — CHLORHEXIDINE GLUCONATE 1 APPLICATION(S): 213 SOLUTION TOPICAL at 23:45

## 2023-02-23 RX ADMIN — ENOXAPARIN SODIUM 110 MILLIGRAM(S): 100 INJECTION SUBCUTANEOUS at 05:30

## 2023-02-23 NOTE — CHART NOTE - NSCHARTNOTESELECT_GEN_ALL_CORE
Event Note
Event Note
Nutrition Support/Nutrition Services
Nutrition Support/Nutrition Services
Transfer Note
Event Note
GI/Event Note
GYN/Event Note
GYN/Event Note
Nutrition Support/Nutrition Services
Sign Off/Event Note

## 2023-02-23 NOTE — CHART NOTE - NSCHARTNOTEFT_GEN_A_CORE
Daughter called at 3:30AM requesting an update on her mother's condition. Requested to be called during the day. Her # is [phone]

## 2023-02-23 NOTE — PROGRESS NOTE ADULT - SUBJECTIVE AND OBJECTIVE BOX
LENGTH OF HOSPITAL STAY: 23d    Overnight events: none  Complaints: none    CHIEF COMPLAINT:   Patient is a 72y old  Female who presents with a chief complaint of respiratory distress (22 Feb 2023 17:47)        HISTORY OF PRESENTING ILLNESS:    HPI:  73 yo female kth afib, htn, solitary kidney presented from [facility] for SOB.   As per the family, the patient tested positive for covid 4 days ago and she has been feeling unwell. She was also SOB but she refused to visit the hospital.   Today she was altered and her oxygen dropped to 70s in the NH, in addition to a drop in her bp.   She presented to the ed and was placed on bipap with no improvement so she was intubated.   She tested positive for covid, and her ct scan showed mild right pleural effusion. admitted to MICU called to evaluate (01 Feb 2023 00:39)    PAST MEDICAL & SURGICAL HISTORY  PAST MEDICAL & SURGICAL HISTORY:  HTN (hypertension)      Diabetes mellitus      Obesity      Gastroesophageal reflux disease      Active asthma      Generalized OA      Afib      H/O hernia repair  2011 and revised in 2013      History of cholecystectomy        SOCIAL HISTORY:    ALLERGIES:  No Known Allergies    MEDICATIONS:  STANDING MEDICATIONS  chlorhexidine 2% Cloths 1 Application(s) Topical daily  cholecalciferol 400 Unit(s) Oral two times a day  enoxaparin Injectable 110 milliGRAM(s) SubCutaneous every 12 hours  hydrochlorothiazide 12.5 milliGRAM(s) Oral daily  insulin lispro (ADMELOG) corrective regimen sliding scale   SubCutaneous every 6 hours  loperamide 2 milliGRAM(s) Oral every 8 hours  losartan 25 milliGRAM(s) Oral daily  NIFEdipine XL 60 milliGRAM(s) Oral daily  pantoprazole    Tablet 40 milliGRAM(s) Oral every 12 hours  saccharomyces boulardii 250 milliGRAM(s) Oral two times a day    PRN MEDICATIONS  acetaminophen     Tablet .. 650 milliGRAM(s) Oral every 6 hours PRN    VITALS:   T(F): 96.4  HR: 74  BP: 125/59  RR: 18  SpO2: 97%    LABS:                        10.3   8.00  )-----------( 204      ( 23 Feb 2023 06:08 )             32.2     02-23    145  |  115<H>  |  21<H>  ----------------------------<  89  3.8   |  19  |  1.0    Ca    10.8<H>      23 Feb 2023 06:08  Mg     1.8     02-23    TPro  6.1  /  Alb  3.5  /  TBili  1.4<H>  /  DBili  x   /  AST  25  /  ALT  37  /  AlkPhos  193<H>  02-23                    PHYSICAL EXAM:  GEN: No acute distress  LUNGS: Normal respiratory effort.  on RA  HEART: S1/S2 present. RRR. no murmurs  ABD: Soft, non-tender, non-distended  EXT: no cyanosis or edema  NEURO: no focal deficits   LENGTH OF HOSPITAL STAY: 23d    Overnight events: none  Complaints: none    CHIEF COMPLAINT:   Patient is a 72y old  Female who presents with a chief complaint of respiratory distress (22 Feb 2023 17:47)        HISTORY OF PRESENTING ILLNESS:    HPI:  73 yo female kth afib, htn, solitary kidney presented from [facility] for SOB.   As per the family, the patient tested positive for covid 4 days ago and she has been feeling unwell. She was also SOB but she refused to visit the hospital.   Today she was altered and her oxygen dropped to 70s in the NH, in addition to a drop in her bp.   She presented to the ed and was placed on bipap with no improvement so she was intubated.   She tested positive for covid, and her ct scan showed mild right pleural effusion. admitted to MICU called to evaluate (01 Feb 2023 00:39)    PAST MEDICAL & SURGICAL HISTORY  PAST MEDICAL & SURGICAL HISTORY:  HTN (hypertension)      Diabetes mellitus      Obesity      Gastroesophageal reflux disease      Active asthma      Generalized OA      Afib      H/O hernia repair  2011 and revised in 2013      History of cholecystectomy        SOCIAL HISTORY:    ALLERGIES:  No Known Allergies    MEDICATIONS:  STANDING MEDICATIONS  chlorhexidine 2% Cloths 1 Application(s) Topical daily  cholecalciferol 400 Unit(s) Oral two times a day  enoxaparin Injectable 110 milliGRAM(s) SubCutaneous every 12 hours  hydrochlorothiazide 12.5 milliGRAM(s) Oral daily  insulin lispro (ADMELOG) corrective regimen sliding scale   SubCutaneous every 6 hours  loperamide 2 milliGRAM(s) Oral every 8 hours  losartan 25 milliGRAM(s) Oral daily  NIFEdipine XL 60 milliGRAM(s) Oral daily  pantoprazole    Tablet 40 milliGRAM(s) Oral every 12 hours  saccharomyces boulardii 250 milliGRAM(s) Oral two times a day    PRN MEDICATIONS  acetaminophen     Tablet .. 650 milliGRAM(s) Oral every 6 hours PRN    VITALS:   T(F): 96.4  HR: 74  BP: 125/59  RR: 18  SpO2: 97%    LABS:                        10.3   8.00  )-----------( 204      ( 23 Feb 2023 06:08 )             32.2     02-23    145  |  115<H>  |  21<H>  ----------------------------<  89  3.8   |  19  |  1.0    Ca    10.8<H>      23 Feb 2023 06:08  Mg     1.8     02-23    TPro  6.1  /  Alb  3.5  /  TBili  1.4<H>  /  DBili  x   /  AST  25  /  ALT  37  /  AlkPhos  193<H>  02-23                    PHYSICAL EXAM:  GEN: No acute distress  LUNGS: Normal respiratory effort.  on RA  HEART: S1/S2 present. RRR. no murmurs  ABD: Soft, non-tender, non-distended  EXT: no cyanosis or edema  NEURO: no focal deficits

## 2023-02-23 NOTE — PROGRESS NOTE ADULT - ASSESSMENT
73 yo F origionally admitted to MICU from ED on 1/31 for acute hypoxic resp failure, COVID, pleural effusion, acute renal failure, UTI. Patient was intubated in ED. Patient was septic and finished course of Ceftriaxone, Levofloxacin, Vanco,  Meropenum for ESBL UTI, and Decadron for COVID. NOLA and sepsis resolved. Extubated on 2/7, but she threw a mucous plug, desaturated, and had to be reintubated on 2/9. Again extubated on 2/11, did well on BIPAP, transitioned to nasal cannula, now on RA. Patient did not pass speech and swallow eval, including FEES, so GI was consulted for PEG placement for feeding. The patient was given PEG feed and was noted to tolerate feeds well. Noted to have multiple watery bowel movents. C. Diff and GI PCR negative.      #Diarrhea - no improvement  -multiple watery bowel movements   -C. Diff and GI PCR negative  -try imodium prior to next feed    #Acute hypoxemic resp failure, with ARDS  and Possible CAP and  COVID - resolved   -extubated 2/11   -now on room air saturating well      #ESBL UTI  and G+C bacteremia/ COAG - STAP  -sp meropenum     #NOLA  likely ATN vs prerenal on CKD 2 with congenital unilateral kidney   -resolved     #H/O A Fib  -rate controlled   -had discussion with patient regarding risks and benefits of starting a/c, including worsening GI or  bleeding, bleeding in general, etc. vs stroke risk by being off a/c (CHADSVASC 3); she says since she has not had bleeding and Hb is stable, she wants to go back on a/c.  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 outpt f/u      #Class2 obesity BMI 38 patient needs to see dietitian outpatient for further evaluation     #Mild tricuspid regurgitation    #Right upper lobe pulmonary cyst     #2.4 cm left adrenal gland nodule     #Debility ( post icu debility) max assist continue with PT     #Anemia no indication for transfusion     DVT ppx: lovenox  GI ppx: protonix  Diet: TF  Dispo: possible DC today or tomorrow

## 2023-02-24 LAB
ALBUMIN SERPL ELPH-MCNC: 3.5 G/DL — SIGNIFICANT CHANGE UP (ref 3.5–5.2)
ALP SERPL-CCNC: 211 U/L — HIGH (ref 30–115)
ALT FLD-CCNC: 53 U/L — HIGH (ref 0–41)
ANION GAP SERPL CALC-SCNC: 13 MMOL/L — SIGNIFICANT CHANGE UP (ref 7–14)
AST SERPL-CCNC: 34 U/L — SIGNIFICANT CHANGE UP (ref 0–41)
BASOPHILS # BLD AUTO: 0.07 K/UL — SIGNIFICANT CHANGE UP (ref 0–0.2)
BASOPHILS NFR BLD AUTO: 0.8 % — SIGNIFICANT CHANGE UP (ref 0–1)
BILIRUB SERPL-MCNC: 1.3 MG/DL — HIGH (ref 0.2–1.2)
BUN SERPL-MCNC: 27 MG/DL — HIGH (ref 10–20)
CALCIUM SERPL-MCNC: 10.6 MG/DL — HIGH (ref 8.4–10.5)
CHLORIDE SERPL-SCNC: 112 MMOL/L — HIGH (ref 98–110)
CO2 SERPL-SCNC: 19 MMOL/L — SIGNIFICANT CHANGE UP (ref 17–32)
CREAT SERPL-MCNC: 1.1 MG/DL — SIGNIFICANT CHANGE UP (ref 0.7–1.5)
EGFR: 54 ML/MIN/1.73M2 — LOW
EOSINOPHIL # BLD AUTO: 0.53 K/UL — SIGNIFICANT CHANGE UP (ref 0–0.7)
EOSINOPHIL NFR BLD AUTO: 6 % — SIGNIFICANT CHANGE UP (ref 0–8)
GLUCOSE BLDC GLUCOMTR-MCNC: 114 MG/DL — HIGH (ref 70–99)
GLUCOSE BLDC GLUCOMTR-MCNC: 142 MG/DL — HIGH (ref 70–99)
GLUCOSE BLDC GLUCOMTR-MCNC: 93 MG/DL — SIGNIFICANT CHANGE UP (ref 70–99)
GLUCOSE BLDC GLUCOMTR-MCNC: 96 MG/DL — SIGNIFICANT CHANGE UP (ref 70–99)
GLUCOSE SERPL-MCNC: 87 MG/DL — SIGNIFICANT CHANGE UP (ref 70–99)
HCT VFR BLD CALC: 32.8 % — LOW (ref 37–47)
HGB BLD-MCNC: 10.3 G/DL — LOW (ref 12–16)
IMM GRANULOCYTES NFR BLD AUTO: 0.3 % — SIGNIFICANT CHANGE UP (ref 0.1–0.3)
LYMPHOCYTES # BLD AUTO: 2.65 K/UL — SIGNIFICANT CHANGE UP (ref 1.2–3.4)
LYMPHOCYTES # BLD AUTO: 29.9 % — SIGNIFICANT CHANGE UP (ref 20.5–51.1)
MAGNESIUM SERPL-MCNC: 1.6 MG/DL — LOW (ref 1.8–2.4)
MCHC RBC-ENTMCNC: 28.5 PG — SIGNIFICANT CHANGE UP (ref 27–31)
MCHC RBC-ENTMCNC: 31.4 G/DL — LOW (ref 32–37)
MCV RBC AUTO: 90.9 FL — SIGNIFICANT CHANGE UP (ref 81–99)
MONOCYTES # BLD AUTO: 0.63 K/UL — HIGH (ref 0.1–0.6)
MONOCYTES NFR BLD AUTO: 7.1 % — SIGNIFICANT CHANGE UP (ref 1.7–9.3)
NEUTROPHILS # BLD AUTO: 4.96 K/UL — SIGNIFICANT CHANGE UP (ref 1.4–6.5)
NEUTROPHILS NFR BLD AUTO: 55.9 % — SIGNIFICANT CHANGE UP (ref 42.2–75.2)
NRBC # BLD: 0 /100 WBCS — SIGNIFICANT CHANGE UP (ref 0–0)
PLATELET # BLD AUTO: 205 K/UL — SIGNIFICANT CHANGE UP (ref 130–400)
POTASSIUM SERPL-MCNC: 3.5 MMOL/L — SIGNIFICANT CHANGE UP (ref 3.5–5)
POTASSIUM SERPL-SCNC: 3.5 MMOL/L — SIGNIFICANT CHANGE UP (ref 3.5–5)
PROT SERPL-MCNC: 6.1 G/DL — SIGNIFICANT CHANGE UP (ref 6–8)
RBC # BLD: 3.61 M/UL — LOW (ref 4.2–5.4)
RBC # FLD: 13.6 % — SIGNIFICANT CHANGE UP (ref 11.5–14.5)
SODIUM SERPL-SCNC: 144 MMOL/L — SIGNIFICANT CHANGE UP (ref 135–146)
WBC # BLD: 8.87 K/UL — SIGNIFICANT CHANGE UP (ref 4.8–10.8)
WBC # FLD AUTO: 8.87 K/UL — SIGNIFICANT CHANGE UP (ref 4.8–10.8)

## 2023-02-24 PROCEDURE — 99232 SBSQ HOSP IP/OBS MODERATE 35: CPT

## 2023-02-24 RX ORDER — HYDROCHLOROTHIAZIDE 25 MG
1 TABLET ORAL
Qty: 0 | Refills: 0 | DISCHARGE
Start: 2023-02-24

## 2023-02-24 RX ORDER — LOPERAMIDE HCL 2 MG
1 TABLET ORAL
Qty: 0 | Refills: 0 | DISCHARGE
Start: 2023-02-24

## 2023-02-24 RX ORDER — POTASSIUM CHLORIDE 20 MEQ
2 PACKET (EA) ORAL
Qty: 0 | Refills: 0 | DISCHARGE

## 2023-02-24 RX ORDER — LOSARTAN/HYDROCHLOROTHIAZIDE 100MG-25MG
1 TABLET ORAL
Qty: 0 | Refills: 0 | DISCHARGE

## 2023-02-24 RX ORDER — MAGNESIUM OXIDE 400 MG ORAL TABLET 241.3 MG
1 TABLET ORAL
Qty: 0 | Refills: 0 | DISCHARGE

## 2023-02-24 RX ORDER — ACETAMINOPHEN 500 MG
2 TABLET ORAL
Qty: 0 | Refills: 0 | DISCHARGE
Start: 2023-02-24

## 2023-02-24 RX ORDER — PREGABALIN 225 MG/1
1 CAPSULE ORAL
Qty: 0 | Refills: 0 | DISCHARGE

## 2023-02-24 RX ORDER — LOPERAMIDE HCL 2 MG
1 TABLET ORAL
Qty: 0 | Refills: 0 | DISCHARGE
Start: 2023-02-24 | End: 2023-02-25

## 2023-02-24 RX ORDER — CARVEDILOL PHOSPHATE 80 MG/1
1 CAPSULE, EXTENDED RELEASE ORAL
Qty: 0 | Refills: 0 | DISCHARGE
Start: 2023-02-24

## 2023-02-24 RX ORDER — PANTOPRAZOLE SODIUM 20 MG/1
1 TABLET, DELAYED RELEASE ORAL
Qty: 0 | Refills: 0 | DISCHARGE
Start: 2023-02-24

## 2023-02-24 RX ORDER — CARVEDILOL PHOSPHATE 80 MG/1
1 CAPSULE, EXTENDED RELEASE ORAL
Qty: 0 | Refills: 0 | DISCHARGE

## 2023-02-24 RX ORDER — ALENDRONATE SODIUM 70 MG/1
1 TABLET ORAL
Qty: 0 | Refills: 0 | DISCHARGE

## 2023-02-24 RX ORDER — NIFEDIPINE 30 MG
1 TABLET, EXTENDED RELEASE 24 HR ORAL
Qty: 0 | Refills: 0 | DISCHARGE
Start: 2023-02-24

## 2023-02-24 RX ORDER — LOSARTAN POTASSIUM 100 MG/1
1 TABLET, FILM COATED ORAL
Qty: 0 | Refills: 0 | DISCHARGE
Start: 2023-02-24

## 2023-02-24 RX ORDER — SODIUM BICARBONATE 1 MEQ/ML
1 SYRINGE (ML) INTRAVENOUS
Qty: 0 | Refills: 0 | DISCHARGE

## 2023-02-24 RX ORDER — CARVEDILOL PHOSPHATE 80 MG/1
6.25 CAPSULE, EXTENDED RELEASE ORAL EVERY 12 HOURS
Refills: 0 | Status: DISCONTINUED | OUTPATIENT
Start: 2023-02-24 | End: 2023-02-25

## 2023-02-24 RX ADMIN — RIVAROXABAN 20 MILLIGRAM(S): KIT at 18:36

## 2023-02-24 RX ADMIN — Medication 2 MILLIGRAM(S): at 12:47

## 2023-02-24 RX ADMIN — Medication 400 UNIT(S): at 18:36

## 2023-02-24 RX ADMIN — Medication 2 MILLIGRAM(S): at 05:12

## 2023-02-24 RX ADMIN — CARVEDILOL PHOSPHATE 6.25 MILLIGRAM(S): 80 CAPSULE, EXTENDED RELEASE ORAL at 18:35

## 2023-02-24 RX ADMIN — LOSARTAN POTASSIUM 25 MILLIGRAM(S): 100 TABLET, FILM COATED ORAL at 05:12

## 2023-02-24 RX ADMIN — Medication 2 MILLIGRAM(S): at 18:36

## 2023-02-24 RX ADMIN — Medication 400 UNIT(S): at 05:43

## 2023-02-24 NOTE — SWALLOW BEDSIDE ASSESSMENT ADULT - SWALLOW EVAL: DIAGNOSIS
+overt s/s of penetration/aspiration w/ thin, mildly thick and puree
Pt completed effortful swallows. No overt s/s of aspiration / penetration. noted with trials
Pt consumed ice chips for tx. Pt rehearsed effortful swallows. Pt with occasional sniffling throughout trials. No overt s/s of aspiration / penetration. noted with trials
pt is at high risk of aspiration

## 2023-02-24 NOTE — PROGRESS NOTE ADULT - ATTENDING COMMENTS
**My note supersedes the resident's note in the event of discrepancies**  Patient seen and examined at bedside. No diarrhea. She had a BM which she thought was diarrhea, but it is just a soft BM from TF's. No abd pain. Was supposed to be d/c'd to [facility] today, apparently no beds.     73 yo female kth afib, htn, solitary kidney presented from [facility] for SOB.     #Diarrhea - resolved   -C. Diff and GI PCR negative  -give imodium prior to each feed    -per nutrition add Banatrol 3 packs/day for 3 days        #Acute hypoxemic resp failure, with ARDS  and Possible CAP and  COVID - resolved   -extubated 2/11    -now on room air saturating well      #ESBL UTI  and G+C bacteremia/ COAG - STAP  -sp meropenum     #NOLA  likely ATN vs prerenal on CKD 2 with congenital unilateral kidney   -resolved     #H/O A Fib  -rate controlled   -had discussion with patient regarding risks and benefits of starting a/c, including worsening GI or  bleeding, bleeding in general, etc. vs stroke risk by being off a/c (CHADSVASC 3); she says since she has not had bleeding and Hb is stable, she wants to go back on a/c. 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        #hypernatremia resolved     #Hypomagnesemia - resolved    #Melena   -resolved    #Vaginal bleeding- resolved   -sp biopsy - showing benign endometrium. outpt f/u      #Class2 obesity BMI 38 patient needs to see dietitian outpatient for further evaluation     #Mild tricuspid regurgitation    #Right upper lobe pulmonary cyst     #2.4 cm left adrenal gland nodule     #Debility ( post icu debility) max assist continue with PT   -needs rehab 3-5x/week at facility    #Anemia no indication for transfusion     resumed Xarelto        PROGRESS NOTE HANDOFF    Pending: resolution of diarrhea, restarting a/c   Dispo: Spoke with daughter, Rabia, at bedside. Her number is [phone] (not correct in chart). Pending bed at [facility]. Keep d/c ready.
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    #Right upper lobe pulmonary cyst     #2.4 cm left adrenal gland nodule.    #Debility ( post icu debility) max assist continue with PT     #Anemia no indication for transfusion     resume dvt ppx lovenox     PROGRESS NOTE HANDOFF    Pending:  PT , reeval by speech and swallow , titrate oxygen as tolerated
**My note supersedes the resident's note in the event of discrepancies**  Patient seen and examined at bedside. This morning, she reported no diarrhea, but by afternoon says she had 2 episodes. No abd pain or fevers. Getting imodium.     71 yo female kth afib, htn, solitary kidney presented from [facility] for SOB.     #Diarrhea - still having some episodes   -2 watery bowel movements today   -C. Diff and GI PCR negative  -give imodium prior to each feed   -per nutrition add Banatrol 3 packs/day for 3 days     #Acute hypoxemic resp failure, with ARDS  and Possible CAP and  COVID - resolved   -extubated 2/11   -now on room air saturating well      #ESBL UTI  and G+C bacteremia/ COAG - STAP  -sp meropenum     #NOLA  likely ATN vs prerenal on CKD 2 with congenital unilateral kidney   -resolved     #H/O A Fib  -rate controlled   -had discussion with patient regarding risks and benefits of starting a/c, including worsening GI or  bleeding, bleeding in general, etc. vs stroke risk by being off a/c (CHADSVASC 3); she says since she has not had bleeding and Hb is stable, she wants to go back on a/c. 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       #hypernatremia resolved     #Hypomagnesemia - resolved    #Melena   -resolved    #Vaginal bleeding- resolved   -sp biopsy - showing benign endometrium. outpt f/u      #Class2 obesity BMI 38 patient needs to see dietitian outpatient for further evaluation     #Mild tricuspid regurgitation    #Right upper lobe pulmonary cyst     #2.4 cm left adrenal gland nodule     #Debility ( post icu debility) max assist continue with PT   -needs rehab 3-5x/week at facility    #Anemia no indication for transfusion     resume Xarelto     PROGRESS NOTE HANDOFF    Pending: resolution of diarrhea, restarting a/c   Dispo: Spoke with daughter, Rabia, at bedside. Her number is [phone] (not correct in chart). Will be here in AM for patient's (hopeful) d/c if diarrhea resolves.
73 yo female kth afib, htn, solitary kidney presented from [facility] for SOB.     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    #Right upper lobe pulmonary cyst     #2.4 cm left adrenal gland nodule.    #Debility ( post icu debility) max assist continue with PT     #Anemia no indication for transfusion     resume dvt ppx lovenox     PROGRESS NOTE HANDOFF    Pending: Tube feeds
I edited the note.
IMPRESSION:    Acute hypoxemic respiratory failure improved   ARDS improved  Sepsis POA  Septic shock improved  ESBL UTI  Hypernatremia  COVID 19 pneumonia  NOLA  likely ATN vs prerenal improving  Possible CAP   HO congential unilateral kidney  HO A FIb  Melena / vaginal bleeding was on therapeutic Lovenox for a fib    Plan as outlined above

## 2023-02-24 NOTE — PROGRESS NOTE ADULT - SUBJECTIVE AND OBJECTIVE BOX
LENGTH OF HOSPITAL STAY: 24d    Overnight events: none  Complaints: none    CHIEF COMPLAINT:   Patient is a 72y old  Female who presents with a chief complaint of respiratory distress (23 Feb 2023 13:23)        HISTORY OF PRESENTING ILLNESS:    HPI:  73 yo female kth afib, htn, solitary kidney presented from [facility] for SOB.   As per the family, the patient tested positive for covid 4 days ago and she has been feeling unwell. She was also SOB but she refused to visit the hospital.   Today she was altered and her oxygen dropped to 70s in the NH, in addition to a drop in her bp.   She presented to the ed and was placed on bipap with no improvement so she was intubated.   She tested positive for covid, and her ct scan showed mild right pleural effusion. admitted to MICU called to evaluate (01 Feb 2023 00:39)    PAST MEDICAL & SURGICAL HISTORY  PAST MEDICAL & SURGICAL HISTORY:  HTN (hypertension)      Diabetes mellitus      Obesity      Gastroesophageal reflux disease      Active asthma      Generalized OA      Afib      H/O hernia repair  2011 and revised in 2013      History of cholecystectomy        SOCIAL HISTORY:    ALLERGIES:  No Known Allergies    MEDICATIONS:  STANDING MEDICATIONS  chlorhexidine 2% Cloths 1 Application(s) Topical daily  cholecalciferol 400 Unit(s) Oral two times a day  hydrochlorothiazide 12.5 milliGRAM(s) Oral daily  insulin lispro (ADMELOG) corrective regimen sliding scale   SubCutaneous every 6 hours  loperamide 2 milliGRAM(s) Oral every 6 hours  losartan 25 milliGRAM(s) Oral daily  NIFEdipine XL 60 milliGRAM(s) Oral daily  pantoprazole    Tablet 40 milliGRAM(s) Oral every 12 hours  rivaroxaban 20 milliGRAM(s) Oral with dinner    PRN MEDICATIONS  acetaminophen     Tablet .. 650 milliGRAM(s) Oral every 6 hours PRN    VITALS:   T(F): 96.8  HR: 71  BP: 143/66  RR: 18  SpO2: 99%    LABS:                        10.3   8.87  )-----------( 205      ( 24 Feb 2023 06:26 )             32.8     02-24    144  |  112<H>  |  27<H>  ----------------------------<  87  3.5   |  19  |  1.1    Ca    10.6<H>      24 Feb 2023 06:26  Mg     1.6     02-24    TPro  6.1  /  Alb  3.5  /  TBili  1.3<H>  /  DBili  x   /  AST  34  /  ALT  53<H>  /  AlkPhos  211<H>  02-24                    PHYSICAL EXAM:  GEN: No acute distress  LUNGS: Normal respiratory effort.  on RA  HEART: S1/S2 present. RRR. no murmurs  ABD: Soft, non-tender, non-distended  EXT: no cyanosis or edema  NEURO: no focal deficits

## 2023-02-24 NOTE — PROGRESS NOTE ADULT - ASSESSMENT
73 yo F origionally admitted to MICU from ED on 1/31 for acute hypoxic resp failure, COVID, pleural effusion, acute renal failure, UTI. Patient was intubated in ED. Patient was septic and finished course of Ceftriaxone, Levofloxacin, Vanco,  Meropenum for ESBL UTI, and Decadron for COVID. NOLA and sepsis resolved. Extubated on 2/7, but she threw a mucous plug, desaturated, and had to be reintubated on 2/9. Again extubated on 2/11, did well on BIPAP, transitioned to nasal cannula, now on RA. Patient did not pass speech and swallow eval, including FEES, so GI was consulted for PEG placement for feeding. The patient was given PEG feed and was noted to tolerate feeds well. Noted to have multiple watery bowel movents. C. Diff and GI PCR negative.    #Diarrhea - resolved  -C. Diff and GI PCR negative  -give imodium prior to each feed   -per nutrition added Banatrol 3 packs/day for 3 days     #Acute hypoxemic resp failure, with ARDS  and Possible CAP and  COVID - resolved   -extubated 2/11   -now on room air saturating well      #ESBL UTI  and G+C bacteremia/ COAG - STAP  -sp meropenum     #NOLA  likely ATN vs prerenal on CKD 2 with congenital unilateral kidney   -resolved     #H/O A Fib  -rate controlled   -had discussion with patient regarding risks and benefits of starting a/c, including worsening GI or  bleeding, bleeding in general, etc. vs stroke risk by being off a/c (CHADSVASC 3); she says since she has not had bleeding and Hb is stable, she wants to go back on a/c. Resumed home dose of Xarelto 20mg qhs. 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       #hypernatremia resolved     #Hypomagnesemia - resolved    #Melena   -resolved    #Vaginal bleeding- resolved   -sp biopsy - showing benign endometrium. outpt f/u      #Class2 obesity BMI 38 patient needs to see dietitian outpatient for further evaluation     #Mild tricuspid regurgitation    #Right upper lobe pulmonary cyst     #2.4 cm left adrenal gland nodule     #Debility ( post icu debility) max assist continue with PT   -needs rehab 3-5x/week at facility    #Anemia no indication for transfusion     DVT ppx: lovenox  GI ppx: protonix  Diet: TF  Dispo: possible DC today or tomorrow

## 2023-02-24 NOTE — SWALLOW BEDSIDE ASSESSMENT ADULT - SLP GENERAL OBSERVATIONS
pt known to acute service w/FEES recs: NPO w/alternate means of nutrition/hydration. pt is currently not a candidate for po diet 2' high risk of aspiration.
pt received alert. in NAD. RN reports poor appetite.
Pt received in bed asleep, woke to verbal stim, on O2 NC, dysphonic and w/ poor head and neck control, +generalized weakness

## 2023-02-24 NOTE — SWALLOW BEDSIDE ASSESSMENT ADULT - SLP PERTINENT HISTORY OF CURRENT PROBLEM
73 yo female with afib, htn, solitary kidney presented from [facility] for COVID pneumonia. She was also SOB but she refused to visit the hospital. She presented to the ed and was placed on bipap with no improvement so she was intubated. She tested positive for covid, and her ct scan showed mild right pleural effusion. Admitted to MICU with AHRF, septic shock. Extubated 2/7, placed on bipap overnight for desaturation, On O2 NC since ~6am.
73 yo female with afib, htn, solitary kidney presented from [facility] for COVID pneumonia. She was also SOB but she refused to visit the hospital. She presented to the ed and was placed on bipap with no improvement so she was intubated. She tested positive for covid, and her ct scan showed mild right pleural effusion. Admitted to MICU with AHRF, septic shock. Extubated 2/7, placed on bipap overnight for desaturation, On O2 NC since ~6am.
73 yo female with afib, htn, solitary kidney presented from [facility] for COVID pneumonia. She was also SOB but she refused to visit the hospital. She presented to the ed and was placed on bipap with no improvement so she was intubated. She tested positive for covid, and her ct scan showed mild right pleural effusion. Admitted to MICU with AHRF, septic shock. Extubated 2/7, placed on bipap overnight for desaturation, On O2 NC since ~6am.
73 yo female with afib, htn, solitary kidney presented from [facility] for COVID pneumonia. She was also SOB but she refused to visit the hospital. She presented to the ed and was placed on bipap with no improvement so she was intubated. She tested positive for covid, and her ct scan showed mild right pleural effusion. Admitted to MICU with AHRF, septic shock. Extubated 2/7, placed on bipap overnight for desaturation, On O2 NC since ~6am.

## 2023-02-25 ENCOUNTER — TRANSCRIPTION ENCOUNTER (OUTPATIENT)
Age: 73
End: 2023-02-25

## 2023-02-25 VITALS
HEART RATE: 71 BPM | OXYGEN SATURATION: 97 % | TEMPERATURE: 97 F | RESPIRATION RATE: 18 BRPM | SYSTOLIC BLOOD PRESSURE: 103 MMHG | DIASTOLIC BLOOD PRESSURE: 51 MMHG

## 2023-02-25 LAB
GLUCOSE BLDC GLUCOMTR-MCNC: 102 MG/DL — HIGH (ref 70–99)
GLUCOSE BLDC GLUCOMTR-MCNC: 103 MG/DL — HIGH (ref 70–99)
GLUCOSE BLDC GLUCOMTR-MCNC: 93 MG/DL — SIGNIFICANT CHANGE UP (ref 70–99)

## 2023-02-25 PROCEDURE — 99239 HOSP IP/OBS DSCHRG MGMT >30: CPT

## 2023-02-25 RX ADMIN — CARVEDILOL PHOSPHATE 6.25 MILLIGRAM(S): 80 CAPSULE, EXTENDED RELEASE ORAL at 05:00

## 2023-02-25 RX ADMIN — Medication 60 MILLIGRAM(S): at 05:01

## 2023-02-25 RX ADMIN — LOSARTAN POTASSIUM 25 MILLIGRAM(S): 100 TABLET, FILM COATED ORAL at 05:01

## 2023-02-25 RX ADMIN — Medication 2 MILLIGRAM(S): at 05:00

## 2023-02-25 RX ADMIN — PANTOPRAZOLE SODIUM 40 MILLIGRAM(S): 20 TABLET, DELAYED RELEASE ORAL at 05:01

## 2023-02-25 RX ADMIN — Medication 400 UNIT(S): at 05:01

## 2023-02-25 RX ADMIN — Medication 2 MILLIGRAM(S): at 00:58

## 2023-02-25 NOTE — DISCHARGE NOTE NURSING/CASE MANAGEMENT/SOCIAL WORK - NSDCPEFALRISK_GEN_ALL_CORE
For information on Fall & Injury Prevention, visit: [email or web address] OR  [email or web address] OR  https://www.cdc.gov/steadi/patient.html

## 2023-02-25 NOTE — PROGRESS NOTE ADULT - PROVIDER SPECIALTY LIST ADULT
Critical Care
ENT
Internal Medicine
Internal Medicine
MICU
MICU
Nephrology
Nutrition Support
Critical Care
Critical Care
ENT
Infectious Disease
Internal Medicine
MICU
Nephrology
Nephrology
Critical Care
Gastroenterology
Hospitalist
Infectious Disease
Internal Medicine
MICU
Nephrology
Nutrition Support
Critical Care
Gastroenterology
Internal Medicine
MICU
Nephrology
Nephrology
Nutrition Support
Infectious Disease
Palliative Care

## 2023-02-25 NOTE — DISCHARGE NOTE NURSING/CASE MANAGEMENT/SOCIAL WORK - NSDCVIVACCINE_GEN_ALL_CORE_FT
influenza, injectable, quadrivalent, preservative free; 02-Oct-2019 12:29; Ellie Gonzalez (RN); [employer]; 3bs44 (Exp. Date: 20-Jun-2020); IntraMuscular; Deltoid Left.; 0.5 milliLiter(s); VIS (VIS Published: 15-Aug-2019, VIS Presented: 02-Oct-2019);

## 2023-02-25 NOTE — PROGRESS NOTE ADULT - SUBJECTIVE AND OBJECTIVE BOX
PATRICIA SPRAGUE  [facility] 031 A ([facility])      Patient is a 72y old  Female who presents with a chief complaint of respiratory distress (24 Feb 2023 13:49)        Interval events:  Patient seen and examined at bedside. No acute events overnight. No diarrhea. Stable for d/c.     -PMHx: HTN (hypertension)    Diabetes mellitus    Obesity    Gastroesophageal reflux disease    Active asthma    Generalized OA    Afib      -PSHx:H/O hernia repair    History of cholecystectomy            REVIEW OF SYSTEMS:  CONSTITUTIONAL: No fever, weight loss, or fatigue  RESPIRATORY: No cough, wheezing, chills or hemoptysis; No shortness of breath  CARDIOVASCULAR: No chest pain, palpitations, dizziness, or leg swelling  GASTROINTESTINAL: No abdominal or epigastric pain. No nausea, vomiting, or hematemesis; No diarrhea or constipation. No melena or hematochezia.  NEUROLOGICAL: No headaches  LYMPH NODES: No enlarged glands  MUSCULOSKELETAL: No joint pain or swelling; No muscle, back, or extremity pain      T(C): , Max: 36.6 (02-25-23 @ 09:39)  HR: 57 (02-25-23 @ 09:39)  BP: 91/50 (02-25-23 @ 09:39)  RR: 18 (02-25-23 @ 09:39)  SpO2: 98% (02-25-23 @ 09:39)  CAPILLARY BLOOD GLUCOSE      POCT Blood Glucose.: 102 mg/dL (25 Feb 2023 14:12)  POCT Blood Glucose.: 103 mg/dL (25 Feb 2023 00:21)  POCT Blood Glucose.: 93 mg/dL (24 Feb 2023 22:21)  POCT Blood Glucose.: 114 mg/dL (24 Feb 2023 16:41)      PHYSICAL EXAM:  GEN: No acute distress  LUNGS: Normal respiratory effort.  on RA  HEART: S1/S2 present. RRR. no murmurs  ABD: Soft, non-tender, non-distended; PEG in place   EXT: no cyanosis or edema  NEURO: no focal deficits      LABS:              Microbiology:    Culture - Acid Fast - Stool w/Smear (collected 02-19-23 @ 21:50)  Source: .Stool Stool cup  Preliminary Report (02-22-23 @ 15:08):    Culture is being performed.        RADIOLOGY & ADDITIONAL TESTS:        Medications:  acetaminophen     Tablet .. 650 milliGRAM(s) Oral every 6 hours PRN  carvedilol 6.25 milliGRAM(s) Oral every 12 hours  chlorhexidine 2% Cloths 1 Application(s) Topical daily  cholecalciferol 400 Unit(s) Oral two times a day  insulin lispro (ADMELOG) corrective regimen sliding scale   SubCutaneous every 6 hours  loperamide 2 milliGRAM(s) Oral every 6 hours  losartan 25 milliGRAM(s) Oral daily  NIFEdipine XL 60 milliGRAM(s) Oral daily  pantoprazole    Tablet 40 milliGRAM(s) Oral every 12 hours  rivaroxaban 20 milliGRAM(s) Oral with dinner

## 2023-02-25 NOTE — PROGRESS NOTE ADULT - TIME BILLING
I have personally seen and examined this patient.    I have reviewed all pertinent clinical information and reviewed all relevant imaging and diagnostic studies personally.   I counseled the patient about diagnostic testing and treatment plan. All questions were answered.   I discussed recommendations with the primary team.
I have personally seen and examined this patient.    I have reviewed all pertinent clinical information and reviewed all relevant imaging and diagnostic studies personally.   I counseled the patient about diagnostic testing and treatment plan. All questions were answered.   I discussed recommendations with the primary team.
Total time spent to complete patient's bedside assessment, physical examination, review medical chart including labs & imaging, discuss medical plan of care with housestaff was more than 35 minutes
I have personally seen and examined this patient.    I have reviewed all pertinent clinical information and reviewed all relevant imaging and diagnostic studies personally.   I counseled the patient about diagnostic testing and treatment plan. All questions were answered.   I discussed recommendations with the primary team.
I have personally seen and examined this patient.    I have reviewed all pertinent clinical information and reviewed all relevant imaging and diagnostic studies personally.   I counseled the patient about diagnostic testing and treatment plan. All questions were answered.   I discussed recommendations with the primary team.
Total time spent to complete patient's bedside assessment, physical examination, review medical chart including labs & imaging, discuss medical plan of care with housestaff was more than 35 minutes
I have personally seen and examined this patient.    I have reviewed all pertinent clinical information and reviewed all relevant imaging and diagnostic studies personally.   I counseled the patient about diagnostic testing and treatment plan. All questions were answered.   I discussed recommendations with the primary team.
Coordination of care
Total time spent to complete patient's bedside assessment, physical examination, review medical chart including labs & imaging, discuss medical plan of care with housestaff was more than 25 minutes
Total time spent to complete patient's bedside assessment, physical examination, review medical chart including labs & imaging, discuss medical plan of care with housestaff was more than 35 minutes

## 2023-02-25 NOTE — PROGRESS NOTE ADULT - REASON FOR ADMISSION
respiratory distress

## 2023-02-25 NOTE — PROGRESS NOTE ADULT - ASSESSMENT
71 yo female kth afib, htn, solitary kidney presented from [facility] for SOB.     #Diarrhea - resolved   -C. Diff and GI PCR negative  -give imodium prior to each feed for 2 more days   -per nutrition add Banatrol 3 packs/day for 3 days; can d/c without     #Acute hypoxemic resp failure, with ARDS  and Possible CAP and  COVID - resolved   -extubated 2/11    -now on room air saturating well      #ESBL UTI  and G+C bacteremia/ COAG - STAP  -sp meropenum     #NOLA  likely ATN vs prerenal on CKD 2 with congenital unilateral kidney   -resolved     #H/O A Fib  -rate controlled   -had discussion with patient regarding risks and benefits of starting a/c, including worsening GI or  bleeding, bleeding in general, etc. vs stroke risk by being off a/c (CHADSVASC 3); she says since she has not had bleeding and Hb is stable, she wants to go back on a/c. 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        #hypernatremia resolved     #Hypomagnesemia - resolved    #Melena   -resolved    #Vaginal bleeding- resolved   -sp biopsy - showing benign endometrium. outpt f/u      #Class2 obesity BMI 38 patient needs to see dietitian outpatient for further evaluation     #Mild tricuspid regurgitation    #Right upper lobe pulmonary cyst     #2.4 cm left adrenal gland nodule     #Debility ( post icu debility) max assist continue with PT   -needs rehab 3-5x/week at facility    #Anemia no indication for transfusion     resumed Xarelto        PROGRESS NOTE HANDOFF    Pending: d/c   Dispo: Spoke with daughter, Rabia, at bedside. Her number is [phone] (not correct in chart). D/c to [facility] today.

## 2023-02-25 NOTE — DISCHARGE NOTE NURSING/CASE MANAGEMENT/SOCIAL WORK - PATIENT PORTAL LINK FT
You can access the FollowMyHealth Patient Portal offered by [facility] by registering at the following website: [email or web address]. By joining [employer]’s FollowMyHealth portal, you will also be able to view your health information using other applications (apps) compatible with our system.

## 2023-03-03 DIAGNOSIS — J90 PLEURAL EFFUSION, NOT ELSEWHERE CLASSIFIED: ICD-10-CM

## 2023-03-03 DIAGNOSIS — R32 UNSPECIFIED URINARY INCONTINENCE: ICD-10-CM

## 2023-03-03 DIAGNOSIS — K92.1 MELENA: ICD-10-CM

## 2023-03-03 DIAGNOSIS — U07.1 COVID-19: ICD-10-CM

## 2023-03-03 DIAGNOSIS — Z16.12 EXTENDED SPECTRUM BETA LACTAMASE (ESBL) RESISTANCE: ICD-10-CM

## 2023-03-03 DIAGNOSIS — E11.65 TYPE 2 DIABETES MELLITUS WITH HYPERGLYCEMIA: ICD-10-CM

## 2023-03-03 DIAGNOSIS — R53.81 OTHER MALAISE: ICD-10-CM

## 2023-03-03 DIAGNOSIS — R19.7 DIARRHEA, UNSPECIFIED: ICD-10-CM

## 2023-03-03 DIAGNOSIS — E87.0 HYPEROSMOLALITY AND HYPERNATREMIA: ICD-10-CM

## 2023-03-03 DIAGNOSIS — N17.0 ACUTE KIDNEY FAILURE WITH TUBULAR NECROSIS: ICD-10-CM

## 2023-03-03 DIAGNOSIS — E87.1 HYPO-OSMOLALITY AND HYPONATREMIA: ICD-10-CM

## 2023-03-03 DIAGNOSIS — J80 ACUTE RESPIRATORY DISTRESS SYNDROME: ICD-10-CM

## 2023-03-03 DIAGNOSIS — L89.326 PRESSURE-INDUCED DEEP TISSUE DAMAGE OF LEFT BUTTOCK: ICD-10-CM

## 2023-03-03 DIAGNOSIS — E83.52 HYPERCALCEMIA: ICD-10-CM

## 2023-03-03 DIAGNOSIS — B96.20 UNSPECIFIED ESCHERICHIA COLI [E. COLI] AS THE CAUSE OF DISEASES CLASSIFIED ELSEWHERE: ICD-10-CM

## 2023-03-03 DIAGNOSIS — J12.82 PNEUMONIA DUE TO CORONAVIRUS DISEASE 2019: ICD-10-CM

## 2023-03-03 DIAGNOSIS — J45.909 UNSPECIFIED ASTHMA, UNCOMPLICATED: ICD-10-CM

## 2023-03-03 DIAGNOSIS — K44.9 DIAPHRAGMATIC HERNIA WITHOUT OBSTRUCTION OR GANGRENE: ICD-10-CM

## 2023-03-03 DIAGNOSIS — J98.4 OTHER DISORDERS OF LUNG: ICD-10-CM

## 2023-03-03 DIAGNOSIS — Z74.01 BED CONFINEMENT STATUS: ICD-10-CM

## 2023-03-03 DIAGNOSIS — N85.00 ENDOMETRIAL HYPERPLASIA, UNSPECIFIED: ICD-10-CM

## 2023-03-03 DIAGNOSIS — D64.9 ANEMIA, UNSPECIFIED: ICD-10-CM

## 2023-03-03 DIAGNOSIS — T17.890A OTHER FOREIGN OBJECT IN OTHER PARTS OF RESPIRATORY TRACT CAUSING ASPHYXIATION, INITIAL ENCOUNTER: ICD-10-CM

## 2023-03-03 DIAGNOSIS — N39.0 URINARY TRACT INFECTION, SITE NOT SPECIFIED: ICD-10-CM

## 2023-03-03 DIAGNOSIS — B96.4 PROTEUS (MIRABILIS) (MORGANII) AS THE CAUSE OF DISEASES CLASSIFIED ELSEWHERE: ICD-10-CM

## 2023-03-03 DIAGNOSIS — E66.01 MORBID (SEVERE) OBESITY DUE TO EXCESS CALORIES: ICD-10-CM

## 2023-03-03 DIAGNOSIS — E03.9 HYPOTHYROIDISM, UNSPECIFIED: ICD-10-CM

## 2023-03-03 DIAGNOSIS — R65.21 SEVERE SEPSIS WITH SEPTIC SHOCK: ICD-10-CM

## 2023-03-03 DIAGNOSIS — N95.0 POSTMENOPAUSAL BLEEDING: ICD-10-CM

## 2023-03-03 DIAGNOSIS — Q60.0 RENAL AGENESIS, UNILATERAL: ICD-10-CM

## 2023-03-03 DIAGNOSIS — K22.10 ULCER OF ESOPHAGUS WITHOUT BLEEDING: ICD-10-CM

## 2023-03-03 DIAGNOSIS — A41.9 SEPSIS, UNSPECIFIED ORGANISM: ICD-10-CM

## 2023-03-03 DIAGNOSIS — K21.9 GASTRO-ESOPHAGEAL REFLUX DISEASE WITHOUT ESOPHAGITIS: ICD-10-CM

## 2023-03-03 DIAGNOSIS — E83.42 HYPOMAGNESEMIA: ICD-10-CM

## 2023-03-03 DIAGNOSIS — J38.00 PARALYSIS OF VOCAL CORDS AND LARYNX, UNSPECIFIED: ICD-10-CM

## 2023-03-03 DIAGNOSIS — R15.9 FULL INCONTINENCE OF FECES: ICD-10-CM

## 2023-03-03 DIAGNOSIS — L89.316 PRESSURE-INDUCED DEEP TISSUE DAMAGE OF RIGHT BUTTOCK: ICD-10-CM

## 2023-03-03 DIAGNOSIS — E87.20 ACIDOSIS, UNSPECIFIED: ICD-10-CM

## 2023-03-03 DIAGNOSIS — R13.10 DYSPHAGIA, UNSPECIFIED: ICD-10-CM

## 2023-03-03 DIAGNOSIS — Z79.899 OTHER LONG TERM (CURRENT) DRUG THERAPY: ICD-10-CM

## 2023-03-03 DIAGNOSIS — K29.70 GASTRITIS, UNSPECIFIED, WITHOUT BLEEDING: ICD-10-CM

## 2023-03-03 DIAGNOSIS — I34.0 NONRHEUMATIC MITRAL (VALVE) INSUFFICIENCY: ICD-10-CM

## 2023-03-03 DIAGNOSIS — I50.9 HEART FAILURE, UNSPECIFIED: ICD-10-CM

## 2023-03-03 DIAGNOSIS — I11.0 HYPERTENSIVE HEART DISEASE WITH HEART FAILURE: ICD-10-CM

## 2023-03-03 DIAGNOSIS — B96.1 KLEBSIELLA PNEUMONIAE [K. PNEUMONIAE] AS THE CAUSE OF DISEASES CLASSIFIED ELSEWHERE: ICD-10-CM

## 2023-03-03 DIAGNOSIS — I48.91 UNSPECIFIED ATRIAL FIBRILLATION: ICD-10-CM

## 2023-03-03 DIAGNOSIS — R49.0 DYSPHONIA: ICD-10-CM

## 2023-03-03 LAB
GLUCOSE BLDC GLUCOMTR-MCNC: 114 MG/DL — HIGH (ref 70–99)
GLUCOSE BLDC GLUCOMTR-MCNC: 125 MG/DL — HIGH (ref 70–99)

## 2023-03-13 ENCOUNTER — APPOINTMENT (OUTPATIENT)
Dept: OTOLARYNGOLOGY | Facility: CLINIC | Age: 73
End: 2023-03-13
Payer: MEDICAID

## 2023-03-13 VITALS — BODY MASS INDEX: 32.14 KG/M2 | WEIGHT: 200 LBS | HEIGHT: 66 IN

## 2023-03-13 DIAGNOSIS — R49.0 DYSPHONIA: ICD-10-CM

## 2023-03-13 DIAGNOSIS — R13.10 DYSPHAGIA, UNSPECIFIED: ICD-10-CM

## 2023-03-13 PROCEDURE — 31575 DIAGNOSTIC LARYNGOSCOPY: CPT

## 2023-03-13 NOTE — HISTORY OF PRESENT ILLNESS
[de-identified] : Patient presents today c/o vocal cord issues .  Patient had lost her voice and had a peg tube inserted on 02/16/2023.  She is going to therapy .  Her voice was completed gone for 3 months.

## 2023-03-13 NOTE — REASON FOR VISIT
[Initial Evaluation] : an initial evaluation for [FreeTextEntry2] : vocal cord issues IV discontinued, cath removed intact

## 2023-03-27 PROBLEM — E11.9 TYPE 2 DIABETES MELLITUS WITHOUT COMPLICATIONS: Chronic | Status: ACTIVE | Noted: 2019-09-26

## 2023-04-03 NOTE — ED ADULT NURSE NOTE - NSFALLRSKINDICTYPE_ED_ALL_ED
Sski Pregnancy And Lactation Text: This medication is Pregnancy Category D and isn't considered safe during pregnancy. It is excreted in breast milk. Impaired Gait

## 2023-04-08 LAB
CULTURE RESULTS: SIGNIFICANT CHANGE UP
SPECIMEN SOURCE: SIGNIFICANT CHANGE UP

## 2023-04-11 NOTE — PRE-OP CHECKLIST - ALLERGIES REVIEWED
I met with Angeli Galindo at the request of Ruth Ann Mccabe to recheck her blood pressure.  Blood pressure medications on the med list were reviewed with patient.    Patient has taken all medications as per usual regimen: NA  Patient reports tolerating them without any issues or concerns: NA    Vitals:    04/11/23 1537 04/11/23 1547   BP: (!) 142/90 (!) 150/88   BP Location: Right arm Right arm   Patient Position: Sitting Sitting   Cuff Size: Adult Regular Adult Regular       After 5 minutes, the patient's blood pressure remained greater than or equal to 140/90.    Is the patient currently having any chest pain? No  Does the patient currently have a headache? Patient states been having more of a fullness than a headache in the past couple weeks.  Does the patient currently have any vision changes? No  Does the patient currently have any nausea? No  Does the patient currently have any abdominal pain? No    Patient was discharged and message sent to Ruth Ann regarding this.    Patient is wondering if she needs to go on any medications can she take hydralazine or something? Would like Ruth Ann to review and get back to her. She does say that she has taken hydralazine before in the past and it has helped. She feels like it's just all this stress and what she is going through that if making her blood pressure high.   done

## 2023-05-02 NOTE — PHYSICAL THERAPY INITIAL EVALUATION ADULT - BALANCE DISTURBANCE, IDENTIFIED IMPAIRMENT CONTRIBUTE, REHAB EVAL
Chart(s)/Patient/Spouse/Significant Other/Other Family Member pain/decreased strength/decreased endurance/decreased ROM/impaired postural control

## 2023-06-21 NOTE — ED ADULT TRIAGE NOTE - AS TEMP SITE
Continue Rifaximin   Ammonia normalized   Follow coags and fibrinogen    Consider re-consult hepatology as stabilizes   Spironolactone increased, monitor K level, replaced prn  Bilirubin trending up   oral

## 2023-09-11 NOTE — PRE-OP CHECKLIST - ASSESSMENT, HISTORY & PHYSICAL COMPLETED AND ON MEDICAL RECORD
done Mirvaso Pregnancy And Lactation Text: This medication has not been assigned a Pregnancy Risk Category. It is unknown if the medication is excreted in breast milk.

## 2023-10-18 NOTE — H&P ADULT - NSVTERISKREFERASSESS_GEN_ALL_CORE
From: Juan Manuel Yepez  To: Dr. Bud Marroquin  Sent: 10/18/2023 10:22 AM EDT  Subject: Neck X-Ray    Good morning. I see my results are reporting. Do these results change my PT needs? Concerned since I have spinal rods and now cervical concerns.      Thank you
Refer to the Assessment tab to view/cancel completed assessment.

## 2023-11-08 NOTE — PATIENT PROFILE ADULT - FUNCTIONAL ASSESSMENT - DAILY ACTIVITY 4.
Large Joint Aspiration/Injection: bilateral knee    Date/Time: 11/8/2023 11:30 AM    Performed by: Pat Taylor PA-C  Authorized by: Pat Taylor PA-C    Consent Done?:  Yes (Verbal)  Indications:  Arthritis  Site marked: the procedure site was marked    Timeout: prior to procedure the correct patient, procedure, and site was verified    Prep: patient was prepped and draped in usual sterile fashion      Local anesthesia used?: Yes    Local anesthetic:  Topical anesthetic    Details:  Needle Size:  22 G  Ultrasonic Guidance for needle placement?: No    Approach:  Anterolateral  Location:  Knee  Laterality:  Bilateral  Site:  Bilateral knee  Medications (Right):  32 mg triamcinolone acetonide 32 mg  Medications (Left):  32 mg triamcinolone acetonide 32 mg  Patient tolerance:  Patient tolerated the procedure well with no immediate complications    
1 = Total assistance

## 2024-01-08 NOTE — ED PROVIDER NOTE - MDM PATIENT STATEMENT FOR ADDL TREATMENT
Feli Laureano Jorge  1995    Assessment and Plan:  Yearly exam without abnormality.     -Pap up to date. We reviewed ASCCP guidelines for Pap testing today.   -Happy with IUD. Strings appropriate.     RTO one year for yearly exam or sooner as needed.        CC:  Yearly exam    S:  28 y.o. female here for yearly exam.     G1  LMP 12/31/23  Contraception: Kyleena   Last Pap:12/2021 NILM    Non-smoker, social drinker    Doing ok overall.     Her cycles are regular, not heavy or crampy.     Sexual activity: She is not sexually active at this time.      STD testing:  She does not want STD testing today.     Gardasil:  She has had the Gardasil series.     Family hx of breast cancer: denies  Family hx of ovarian cancer: denies  Family hx of colon cancer: denies     Denies hot flushes, dyspareunia, abnormal uterine bleeding, urinary/fecal incontinence, changes in energy levels, mood.       Current Outpatient Medications:     buPROPion (WELLBUTRIN XL) 300 mg 24 hr tablet, TAKE 1 TABLET BY MOUTH DAILY IN THE MORNING OR AS DIRECTED, Disp: , Rfl:     hydrOXYzine pamoate (VISTARIL) 25 mg capsule, TAKE 1 TO 2 CAPSULES EVERY 6 HOURS AS NEEDED AS DIRECTED MAX 4 CAPS/DAY, Disp: , Rfl:     levonorgestrel (KYLEENA) 19.5 MG intrauterine device, 1 each by Intrauterine route once, Disp: , Rfl:     levothyroxine 100 mcg tablet, Take 100 mcg by mouth daily, Disp: , Rfl:     buPROPion (WELLBUTRIN XL) 150 mg 24 hr tablet, , Disp: , Rfl:     levonorgestrel (KYLEENA) 19.5 MG intrauterine device, 1 Intra Uterine Device by Intrauterine route once (Patient not taking: Reported on 11/20/2023), Disp: , Rfl:     terconazole (TERAZOL 3) 0.8 % vaginal cream, Insert 1 applicator into the vagina daily at bedtime (Patient not taking: Reported on 12/22/2022), Disp: 20 g, Rfl: 0    valACYclovir (VALTREX) 1,000 mg tablet, TAKE 1 TABLET BY MOUTH TWICE A DAY FOR 2 DAYS, Disp: , Rfl:   Social History     Socioeconomic History    Marital status: Single      Spouse name: Not on file    Number of children: Not on file    Years of education: Not on file    Highest education level: Not on file   Occupational History    Not on file   Tobacco Use    Smoking status: Never    Smokeless tobacco: Never   Vaping Use    Vaping status: Never Used   Substance and Sexual Activity    Alcohol use: Yes     Comment: On occasion    Drug use: No    Sexual activity: Not Currently     Partners: Male     Birth control/protection: Condom Male, I.U.D.     Comment: Joey 11/2023   Other Topics Concern    Not on file   Social History Narrative    Not on file     Social Determinants of Health     Financial Resource Strain: Not on file   Food Insecurity: Not on file   Transportation Needs: Not on file   Physical Activity: Not on file   Stress: Not on file   Social Connections: Not on file   Intimate Partner Violence: Not on file   Housing Stability: Not on file     Family History   Problem Relation Age of Onset    Migraines Mother     Thyroid disease Mother     Hypertension Mother     No Known Problems Father     Thyroid disease Sister     Factor V Leiden deficiency Paternal Grandmother     Diabetes Paternal Grandmother       Past Medical History:   Diagnosis Date    Chlamydia     Clotting disorder (HCC)     Depression 11/14/2021    Disease of thyroid gland     Factor V Leiden carrier (HCC)     Hypothyroidism 2002    Migraine     Urinary tract infection         Review of Systems   Respiratory: Negative.    Cardiovascular: Negative.    Gastrointestinal: Negative for constipation and diarrhea.   Genitourinary: Negative for difficulty urinating, pelvic pain, vaginal bleeding, vaginal discharge, itching or odor.    O:  Blood pressure 112/78, weight 62.5 kg (137 lb 12.8 oz), last menstrual period 12/31/2023.    Patient appears well and is not in distress  Neck is supple without masses  Breasts are symmetrical without mass, tenderness, nipple discharge, skin changes or adenopathy.   Abdomen is soft  and nontender without masses.   External genitals are normal without lesions or rashes.  Urethral meatus and urethra are normal  Bladder is normal to palpation  Vagina is normal without discharge or bleeding.   Cervix is normal without discharge or lesion. IUD strings normal in length  Uterus is normal, mobile, nontender without palpable mass.  Adnexa are normal, nontender, without palpable mass.     Patient with one or more new problems requiring additional work-up/treatment.

## 2024-02-16 NOTE — SWALLOW BEDSIDE ASSESSMENT ADULT - COMMENTS
Pt ambulated to restroom and back with no assistance. Steady gait. Updated on plan of care. Voiced no needs. Call light in reach.  
Pt had FEES 2/14 · Preliminary Endoscopic Examinations  +glottic gap +intubation granulomas, still present yet smaller  · Diagnostic Impressions  Severe to profound pharyngeal dysphagia w/ silent aspiration  · Recommended Consistency  NPO, agree w/ plan for long term non-oral means of nutrition and hydration at this time.
Pt had FEES 2/14 · Preliminary Endoscopic Examinations  +glottic gap +intubation granulomas, still present yet smaller  · Diagnostic Impressions  Severe to profound pharyngeal dysphagia w/ silent aspiration  · Recommended Consistency  NPO, agree w/ plan for long term non-oral means of nutrition and hydration at this time.
pt known to acute service w/FEES recs: NPO w/alternate means of nutrition/hydration. pt is currently not a candidate for po diet 2' high risk of aspiration.

## 2024-05-25 NOTE — H&P ADULT - PSYCHIATRIC
Patient exhausted but is finally able to rest. His SpO2 while sleeping is 90-93%. Medications significantly helped his headache.    Affect and characteristics of appearance, verbalizations, behaviors are appropriate

## 2024-11-03 ENCOUNTER — INPATIENT (INPATIENT)
Facility: HOSPITAL | Age: 74
LOS: 35 days | Discharge: NURSING, REHAB OR LONG-TERM CARE | DRG: 720 | End: 2024-12-09
Attending: HOSPITALIST | Admitting: INTERNAL MEDICINE
Payer: MEDICAID

## 2024-11-03 VITALS
HEART RATE: 90 BPM | DIASTOLIC BLOOD PRESSURE: 45 MMHG | TEMPERATURE: 98 F | RESPIRATION RATE: 18 BRPM | SYSTOLIC BLOOD PRESSURE: 66 MMHG | WEIGHT: 199.52 LBS

## 2024-11-03 DIAGNOSIS — Z90.49 ACQUIRED ABSENCE OF OTHER SPECIFIED PARTS OF DIGESTIVE TRACT: Chronic | ICD-10-CM

## 2024-11-03 DIAGNOSIS — A41.9 SEPSIS, UNSPECIFIED ORGANISM: ICD-10-CM

## 2024-11-03 DIAGNOSIS — Z98.890 OTHER SPECIFIED POSTPROCEDURAL STATES: Chronic | ICD-10-CM

## 2024-11-03 LAB
ALBUMIN SERPL ELPH-MCNC: 3.4 G/DL — LOW (ref 3.5–5.2)
ALBUMIN SERPL ELPH-MCNC: 3.7 G/DL — SIGNIFICANT CHANGE UP (ref 3.5–5.2)
ALP SERPL-CCNC: 124 U/L — HIGH (ref 30–115)
ALP SERPL-CCNC: 141 U/L — HIGH (ref 30–115)
ALT FLD-CCNC: 33 U/L — SIGNIFICANT CHANGE UP (ref 0–41)
ALT FLD-CCNC: 51 U/L — HIGH (ref 0–41)
ANION GAP SERPL CALC-SCNC: 19 MMOL/L — HIGH (ref 7–14)
ANION GAP SERPL CALC-SCNC: 21 MMOL/L — HIGH (ref 7–14)
APPEARANCE UR: ABNORMAL
APPEARANCE UR: ABNORMAL
APTT BLD: 36.3 SEC — SIGNIFICANT CHANGE UP (ref 27–39.2)
AST SERPL-CCNC: 18 U/L — SIGNIFICANT CHANGE UP (ref 0–41)
AST SERPL-CCNC: 43 U/L — HIGH (ref 0–41)
BACTERIA # UR AUTO: ABNORMAL /HPF
BASOPHILS # BLD AUTO: 0.04 K/UL — SIGNIFICANT CHANGE UP (ref 0–0.2)
BASOPHILS NFR BLD AUTO: 0.2 % — SIGNIFICANT CHANGE UP (ref 0–1)
BILIRUB SERPL-MCNC: 1.2 MG/DL — SIGNIFICANT CHANGE UP (ref 0.2–1.2)
BILIRUB SERPL-MCNC: 1.7 MG/DL — HIGH (ref 0.2–1.2)
BILIRUB UR-MCNC: NEGATIVE — SIGNIFICANT CHANGE UP
BILIRUB UR-MCNC: NEGATIVE — SIGNIFICANT CHANGE UP
BLD GP AB SCN SERPL QL: SIGNIFICANT CHANGE UP
BUN SERPL-MCNC: 105 MG/DL — CRITICAL HIGH (ref 10–20)
BUN SERPL-MCNC: 97 MG/DL — CRITICAL HIGH (ref 10–20)
C DIFF GDH STL QL: NEGATIVE — SIGNIFICANT CHANGE UP
C DIFF GDH STL QL: SIGNIFICANT CHANGE UP
CALCIUM SERPL-MCNC: 10 MG/DL — SIGNIFICANT CHANGE UP (ref 8.4–10.5)
CALCIUM SERPL-MCNC: 9.7 MG/DL — SIGNIFICANT CHANGE UP (ref 8.4–10.5)
CAST: 49 /LPF — HIGH (ref 0–4)
CHLORIDE SERPL-SCNC: 95 MMOL/L — LOW (ref 98–110)
CHLORIDE SERPL-SCNC: 98 MMOL/L — SIGNIFICANT CHANGE UP (ref 98–110)
CO2 SERPL-SCNC: 17 MMOL/L — SIGNIFICANT CHANGE UP (ref 17–32)
CO2 SERPL-SCNC: 19 MMOL/L — SIGNIFICANT CHANGE UP (ref 17–32)
COLOR SPEC: SIGNIFICANT CHANGE UP
COLOR SPEC: YELLOW — SIGNIFICANT CHANGE UP
CREAT SERPL-MCNC: 4.1 MG/DL — CRITICAL HIGH (ref 0.7–1.5)
CREAT SERPL-MCNC: 4.3 MG/DL — CRITICAL HIGH (ref 0.7–1.5)
DIFF PNL FLD: ABNORMAL
DIFF PNL FLD: ABNORMAL
EGFR: 10 ML/MIN/1.73M2 — LOW
EGFR: 11 ML/MIN/1.73M2 — LOW
EOSINOPHIL # BLD AUTO: 0 K/UL — SIGNIFICANT CHANGE UP (ref 0–0.7)
EOSINOPHIL NFR BLD AUTO: 0 % — SIGNIFICANT CHANGE UP (ref 0–8)
GAS PNL BLDV: SIGNIFICANT CHANGE UP
GAS PNL BLDV: SIGNIFICANT CHANGE UP
GLUCOSE BLDC GLUCOMTR-MCNC: 131 MG/DL — HIGH (ref 70–99)
GLUCOSE SERPL-MCNC: 137 MG/DL — HIGH (ref 70–99)
GLUCOSE SERPL-MCNC: 139 MG/DL — HIGH (ref 70–99)
GLUCOSE UR QL: NEGATIVE MG/DL — SIGNIFICANT CHANGE UP
GLUCOSE UR QL: NEGATIVE MG/DL — SIGNIFICANT CHANGE UP
HCT VFR BLD CALC: 41.6 % — SIGNIFICANT CHANGE UP (ref 37–47)
HCT VFR BLD CALC: 42.7 % — SIGNIFICANT CHANGE UP (ref 37–47)
HGB BLD-MCNC: 13.9 G/DL — SIGNIFICANT CHANGE UP (ref 12–16)
HGB BLD-MCNC: 13.9 G/DL — SIGNIFICANT CHANGE UP (ref 12–16)
IMM GRANULOCYTES NFR BLD AUTO: 0.4 % — HIGH (ref 0.1–0.3)
INR BLD: 1.11 RATIO — SIGNIFICANT CHANGE UP (ref 0.65–1.3)
KETONES UR-MCNC: NEGATIVE MG/DL — SIGNIFICANT CHANGE UP
KETONES UR-MCNC: NEGATIVE MG/DL — SIGNIFICANT CHANGE UP
LACTATE SERPL-SCNC: 2.2 MMOL/L — HIGH (ref 0.7–2)
LACTATE SERPL-SCNC: 2.5 MMOL/L — HIGH (ref 0.7–2)
LACTATE SERPL-SCNC: 2.9 MMOL/L — HIGH (ref 0.7–2)
LEUKOCYTE ESTERASE UR-ACNC: ABNORMAL
LEUKOCYTE ESTERASE UR-ACNC: ABNORMAL
LYMPHOCYTES # BLD AUTO: 11.1 % — LOW (ref 20.5–51.1)
LYMPHOCYTES # BLD AUTO: 2.15 K/UL — SIGNIFICANT CHANGE UP (ref 1.2–3.4)
MCHC RBC-ENTMCNC: 29.4 PG — SIGNIFICANT CHANGE UP (ref 27–31)
MCHC RBC-ENTMCNC: 30.2 PG — SIGNIFICANT CHANGE UP (ref 27–31)
MCHC RBC-ENTMCNC: 32.6 G/DL — SIGNIFICANT CHANGE UP (ref 32–37)
MCHC RBC-ENTMCNC: 33.4 G/DL — SIGNIFICANT CHANGE UP (ref 32–37)
MCV RBC AUTO: 90.2 FL — SIGNIFICANT CHANGE UP (ref 81–99)
MCV RBC AUTO: 90.5 FL — SIGNIFICANT CHANGE UP (ref 81–99)
MONOCYTES # BLD AUTO: 1.72 K/UL — HIGH (ref 0.1–0.6)
MONOCYTES NFR BLD AUTO: 8.9 % — SIGNIFICANT CHANGE UP (ref 1.7–9.3)
NEUTROPHILS # BLD AUTO: 15.31 K/UL — HIGH (ref 1.4–6.5)
NEUTROPHILS NFR BLD AUTO: 79.4 % — HIGH (ref 42.2–75.2)
NITRITE UR-MCNC: NEGATIVE — SIGNIFICANT CHANGE UP
NITRITE UR-MCNC: NEGATIVE — SIGNIFICANT CHANGE UP
NRBC # BLD: 0 /100 WBCS — SIGNIFICANT CHANGE UP (ref 0–0)
NRBC # BLD: 0 /100 WBCS — SIGNIFICANT CHANGE UP (ref 0–0)
PH UR: 5 — SIGNIFICANT CHANGE UP (ref 5–8)
PH UR: 5.5 — SIGNIFICANT CHANGE UP (ref 5–8)
PLATELET # BLD AUTO: 198 K/UL — SIGNIFICANT CHANGE UP (ref 130–400)
PLATELET # BLD AUTO: 274 K/UL — SIGNIFICANT CHANGE UP (ref 130–400)
PMV BLD: 12.6 FL — HIGH (ref 7.4–10.4)
PMV BLD: 12.6 FL — HIGH (ref 7.4–10.4)
POTASSIUM SERPL-MCNC: 4 MMOL/L — SIGNIFICANT CHANGE UP (ref 3.5–5)
POTASSIUM SERPL-MCNC: 4.1 MMOL/L — SIGNIFICANT CHANGE UP (ref 3.5–5)
POTASSIUM SERPL-SCNC: 4 MMOL/L — SIGNIFICANT CHANGE UP (ref 3.5–5)
POTASSIUM SERPL-SCNC: 4.1 MMOL/L — SIGNIFICANT CHANGE UP (ref 3.5–5)
PROT SERPL-MCNC: 5.5 G/DL — LOW (ref 6–8)
PROT SERPL-MCNC: 6.1 G/DL — SIGNIFICANT CHANGE UP (ref 6–8)
PROT UR-MCNC: 30 MG/DL
PROT UR-MCNC: 300 MG/DL
PROTHROM AB SERPL-ACNC: 13.1 SEC — HIGH (ref 9.95–12.87)
RBC # BLD: 4.61 M/UL — SIGNIFICANT CHANGE UP (ref 4.2–5.4)
RBC # BLD: 4.72 M/UL — SIGNIFICANT CHANGE UP (ref 4.2–5.4)
RBC # FLD: 13.2 % — SIGNIFICANT CHANGE UP (ref 11.5–14.5)
RBC # FLD: 13.2 % — SIGNIFICANT CHANGE UP (ref 11.5–14.5)
RBC CASTS # UR COMP ASSIST: 2 /HPF — SIGNIFICANT CHANGE UP (ref 0–4)
SODIUM SERPL-SCNC: 133 MMOL/L — LOW (ref 135–146)
SODIUM SERPL-SCNC: 136 MMOL/L — SIGNIFICANT CHANGE UP (ref 135–146)
SP GR SPEC: 1.02 — SIGNIFICANT CHANGE UP (ref 1–1.03)
SP GR SPEC: 1.02 — SIGNIFICANT CHANGE UP (ref 1–1.03)
SQUAMOUS # UR AUTO: 4 /HPF — SIGNIFICANT CHANGE UP (ref 0–5)
UROBILINOGEN FLD QL: 0.2 MG/DL — SIGNIFICANT CHANGE UP (ref 0.2–1)
UROBILINOGEN FLD QL: 0.2 MG/DL — SIGNIFICANT CHANGE UP (ref 0.2–1)
WBC # BLD: 19.29 K/UL — HIGH (ref 4.8–10.8)
WBC # BLD: 21.34 K/UL — HIGH (ref 4.8–10.8)
WBC # FLD AUTO: 19.29 K/UL — HIGH (ref 4.8–10.8)
WBC # FLD AUTO: 21.34 K/UL — HIGH (ref 4.8–10.8)
WBC UR QL: 61 /HPF — HIGH (ref 0–5)
YEAST-LIKE CELLS: PRESENT

## 2024-11-03 PROCEDURE — 93970 EXTREMITY STUDY: CPT

## 2024-11-03 PROCEDURE — 85384 FIBRINOGEN ACTIVITY: CPT

## 2024-11-03 PROCEDURE — 84295 ASSAY OF SERUM SODIUM: CPT

## 2024-11-03 PROCEDURE — 71045 X-RAY EXAM CHEST 1 VIEW: CPT

## 2024-11-03 PROCEDURE — 84100 ASSAY OF PHOSPHORUS: CPT

## 2024-11-03 PROCEDURE — 82330 ASSAY OF CALCIUM: CPT

## 2024-11-03 PROCEDURE — 85025 COMPLETE CBC W/AUTO DIFF WBC: CPT

## 2024-11-03 PROCEDURE — 83605 ASSAY OF LACTIC ACID: CPT

## 2024-11-03 PROCEDURE — 94660 CPAP INITIATION&MGMT: CPT

## 2024-11-03 PROCEDURE — 84478 ASSAY OF TRIGLYCERIDES: CPT

## 2024-11-03 PROCEDURE — 80053 COMPREHEN METABOLIC PANEL: CPT

## 2024-11-03 PROCEDURE — 87324 CLOSTRIDIUM AG IA: CPT

## 2024-11-03 PROCEDURE — 87102 FUNGUS ISOLATION CULTURE: CPT

## 2024-11-03 PROCEDURE — 92507 TX SP LANG VOICE COMM INDIV: CPT | Mod: GN

## 2024-11-03 PROCEDURE — 94760 N-INVAS EAR/PLS OXIMETRY 1: CPT

## 2024-11-03 PROCEDURE — 84156 ASSAY OF PROTEIN URINE: CPT

## 2024-11-03 PROCEDURE — 84133 ASSAY OF URINE POTASSIUM: CPT

## 2024-11-03 PROCEDURE — 83735 ASSAY OF MAGNESIUM: CPT

## 2024-11-03 PROCEDURE — 93005 ELECTROCARDIOGRAM TRACING: CPT

## 2024-11-03 PROCEDURE — 87205 SMEAR GRAM STAIN: CPT

## 2024-11-03 PROCEDURE — 87116 MYCOBACTERIA CULTURE: CPT

## 2024-11-03 PROCEDURE — 84540 ASSAY OF URINE/UREA-N: CPT

## 2024-11-03 PROCEDURE — 87641 MR-STAPH DNA AMP PROBE: CPT

## 2024-11-03 PROCEDURE — 87045 FECES CULTURE AEROBIC BACT: CPT

## 2024-11-03 PROCEDURE — C1769: CPT

## 2024-11-03 PROCEDURE — 84630 ASSAY OF ZINC: CPT

## 2024-11-03 PROCEDURE — 99291 CRITICAL CARE FIRST HOUR: CPT

## 2024-11-03 PROCEDURE — 86900 BLOOD TYPING SEROLOGIC ABO: CPT

## 2024-11-03 PROCEDURE — 84145 PROCALCITONIN (PCT): CPT

## 2024-11-03 PROCEDURE — 82570 ASSAY OF URINE CREATININE: CPT

## 2024-11-03 PROCEDURE — 87077 CULTURE AEROBIC IDENTIFY: CPT

## 2024-11-03 PROCEDURE — 70450 CT HEAD/BRAIN W/O DYE: CPT | Mod: MC

## 2024-11-03 PROCEDURE — 80048 BASIC METABOLIC PNL TOTAL CA: CPT

## 2024-11-03 PROCEDURE — 94002 VENT MGMT INPAT INIT DAY: CPT

## 2024-11-03 PROCEDURE — 87449 NOS EACH ORGANISM AG IA: CPT

## 2024-11-03 PROCEDURE — 92526 ORAL FUNCTION THERAPY: CPT | Mod: GN

## 2024-11-03 PROCEDURE — 88312 SPECIAL STAINS GROUP 1: CPT

## 2024-11-03 PROCEDURE — 84132 ASSAY OF SERUM POTASSIUM: CPT

## 2024-11-03 PROCEDURE — L8699: CPT

## 2024-11-03 PROCEDURE — 92523 SPEECH SOUND LANG COMPREHEN: CPT | Mod: GN

## 2024-11-03 PROCEDURE — 74174 CTA ABD&PLVS W/CONTRAST: CPT | Mod: MC

## 2024-11-03 PROCEDURE — 86901 BLOOD TYPING SEROLOGIC RH(D): CPT

## 2024-11-03 PROCEDURE — 82010 KETONE BODYS QUAN: CPT

## 2024-11-03 PROCEDURE — 76705 ECHO EXAM OF ABDOMEN: CPT

## 2024-11-03 PROCEDURE — 82962 GLUCOSE BLOOD TEST: CPT

## 2024-11-03 PROCEDURE — 97166 OT EVAL MOD COMPLEX 45 MIN: CPT | Mod: GO

## 2024-11-03 PROCEDURE — 85379 FIBRIN DEGRADATION QUANT: CPT

## 2024-11-03 PROCEDURE — 85610 PROTHROMBIN TIME: CPT

## 2024-11-03 PROCEDURE — 82803 BLOOD GASES ANY COMBINATION: CPT

## 2024-11-03 PROCEDURE — 94640 AIRWAY INHALATION TREATMENT: CPT

## 2024-11-03 PROCEDURE — 74018 RADEX ABDOMEN 1 VIEW: CPT

## 2024-11-03 PROCEDURE — 80061 LIPID PANEL: CPT

## 2024-11-03 PROCEDURE — 83930 ASSAY OF BLOOD OSMOLALITY: CPT

## 2024-11-03 PROCEDURE — 97530 THERAPEUTIC ACTIVITIES: CPT | Mod: GP

## 2024-11-03 PROCEDURE — 85018 HEMOGLOBIN: CPT

## 2024-11-03 PROCEDURE — 87507 IADNA-DNA/RNA PROBE TQ 12-25: CPT

## 2024-11-03 PROCEDURE — 85014 HEMATOCRIT: CPT

## 2024-11-03 PROCEDURE — 86850 RBC ANTIBODY SCREEN: CPT

## 2024-11-03 PROCEDURE — 88305 TISSUE EXAM BY PATHOLOGIST: CPT

## 2024-11-03 PROCEDURE — 84443 ASSAY THYROID STIM HORMONE: CPT

## 2024-11-03 PROCEDURE — 74176 CT ABD & PELVIS W/O CONTRAST: CPT | Mod: 26,MC

## 2024-11-03 PROCEDURE — 92610 EVALUATE SWALLOWING FUNCTION: CPT | Mod: GN

## 2024-11-03 PROCEDURE — 84300 ASSAY OF URINE SODIUM: CPT

## 2024-11-03 PROCEDURE — 83935 ASSAY OF URINE OSMOLALITY: CPT

## 2024-11-03 PROCEDURE — 87252 VIRUS INOCULATION TISSUE: CPT

## 2024-11-03 PROCEDURE — 93306 TTE W/DOPPLER COMPLETE: CPT

## 2024-11-03 PROCEDURE — 81001 URINALYSIS AUTO W/SCOPE: CPT

## 2024-11-03 PROCEDURE — 87070 CULTURE OTHR SPECIMN AEROBIC: CPT

## 2024-11-03 PROCEDURE — 71045 X-RAY EXAM CHEST 1 VIEW: CPT | Mod: 26

## 2024-11-03 PROCEDURE — 85027 COMPLETE CBC AUTOMATED: CPT

## 2024-11-03 PROCEDURE — 87046 STOOL CULTR AEROBIC BACT EA: CPT

## 2024-11-03 PROCEDURE — 87015 SPECIMEN INFECT AGNT CONCNTJ: CPT

## 2024-11-03 PROCEDURE — 87206 SMEAR FLUORESCENT/ACID STAI: CPT

## 2024-11-03 PROCEDURE — 36415 COLL VENOUS BLD VENIPUNCTURE: CPT

## 2024-11-03 PROCEDURE — 86022 PLATELET ANTIBODIES: CPT

## 2024-11-03 PROCEDURE — 83036 HEMOGLOBIN GLYCOSYLATED A1C: CPT

## 2024-11-03 PROCEDURE — 92612 ENDOSCOPY SWALLOW (FEES) VID: CPT | Mod: GN

## 2024-11-03 PROCEDURE — 99223 1ST HOSP IP/OBS HIGH 75: CPT

## 2024-11-03 PROCEDURE — 94003 VENT MGMT INPAT SUBQ DAY: CPT

## 2024-11-03 PROCEDURE — 97162 PT EVAL MOD COMPLEX 30 MIN: CPT | Mod: GP

## 2024-11-03 PROCEDURE — 80202 ASSAY OF VANCOMYCIN: CPT

## 2024-11-03 PROCEDURE — 87640 STAPH A DNA AMP PROBE: CPT

## 2024-11-03 PROCEDURE — 85730 THROMBOPLASTIN TIME PARTIAL: CPT

## 2024-11-03 RX ORDER — 0.9 % SODIUM CHLORIDE 0.9 %
2800 INTRAVENOUS SOLUTION INTRAVENOUS ONCE
Refills: 0 | Status: COMPLETED | OUTPATIENT
Start: 2024-11-03 | End: 2024-11-03

## 2024-11-03 RX ORDER — MEROPENEM 500 MG/1
500 INJECTION, POWDER, FOR SOLUTION INTRAVENOUS EVERY 12 HOURS
Refills: 0 | Status: DISCONTINUED | OUTPATIENT
Start: 2024-11-04 | End: 2024-11-07

## 2024-11-03 RX ORDER — MEROPENEM 500 MG/1
500 INJECTION, POWDER, FOR SOLUTION INTRAVENOUS ONCE
Refills: 0 | Status: COMPLETED | OUTPATIENT
Start: 2024-11-03 | End: 2024-11-04

## 2024-11-03 RX ORDER — 0.9 % SODIUM CHLORIDE 0.9 %
1000 INTRAVENOUS SOLUTION INTRAVENOUS
Refills: 0 | Status: DISCONTINUED | OUTPATIENT
Start: 2024-11-03 | End: 2024-11-05

## 2024-11-03 RX ORDER — LABETALOL 100 MG/1
1 TABLET, FILM COATED ORAL
Refills: 0 | DISCHARGE

## 2024-11-03 RX ORDER — RIVAROXABAN 10 MG/1
1 TABLET, FILM COATED ORAL
Refills: 0 | DISCHARGE

## 2024-11-03 RX ORDER — VANCOMYCIN HCL 900 MCG/MG
1000 POWDER (GRAM) MISCELLANEOUS ONCE
Refills: 0 | Status: COMPLETED | OUTPATIENT
Start: 2024-11-03 | End: 2024-11-03

## 2024-11-03 RX ORDER — CEFEPIME 2 G/1
2000 INJECTION, POWDER, FOR SOLUTION INTRAVENOUS ONCE
Refills: 0 | Status: COMPLETED | OUTPATIENT
Start: 2024-11-03 | End: 2024-11-03

## 2024-11-03 RX ORDER — MEROPENEM 500 MG/1
INJECTION, POWDER, FOR SOLUTION INTRAVENOUS
Refills: 0 | Status: DISCONTINUED | OUTPATIENT
Start: 2024-11-04 | End: 2024-11-07

## 2024-11-03 RX ORDER — VANCOMYCIN HCL 900 MCG/MG
1000 POWDER (GRAM) MISCELLANEOUS EVERY 24 HOURS
Refills: 0 | Status: DISCONTINUED | OUTPATIENT
Start: 2024-11-03 | End: 2024-11-04

## 2024-11-03 RX ORDER — PANTOPRAZOLE SODIUM 40 MG/1
40 TABLET, DELAYED RELEASE ORAL DAILY
Refills: 0 | Status: ACTIVE | OUTPATIENT
Start: 2024-11-03 | End: 2025-10-02

## 2024-11-03 RX ORDER — METRONIDAZOLE 500 MG/1
500 TABLET ORAL ONCE
Refills: 0 | Status: COMPLETED | OUTPATIENT
Start: 2024-11-03 | End: 2024-11-03

## 2024-11-03 RX ORDER — NOREPINEPHRINE BITARTRATE 1 MG/ML
0.05 INJECTION, SOLUTION, CONCENTRATE INTRAVENOUS
Qty: 8 | Refills: 0 | Status: DISCONTINUED | OUTPATIENT
Start: 2024-11-03 | End: 2024-11-07

## 2024-11-03 RX ORDER — METRONIDAZOLE 500 MG/1
500 TABLET ORAL EVERY 8 HOURS
Refills: 0 | Status: DISCONTINUED | OUTPATIENT
Start: 2024-11-03 | End: 2024-11-03

## 2024-11-03 RX ADMIN — Medication 250 MILLIGRAM(S): at 13:46

## 2024-11-03 RX ADMIN — CEFEPIME 100 MILLIGRAM(S): 2 INJECTION, POWDER, FOR SOLUTION INTRAVENOUS at 12:56

## 2024-11-03 RX ADMIN — METRONIDAZOLE 100 MILLIGRAM(S): 500 TABLET ORAL at 13:47

## 2024-11-03 RX ADMIN — Medication 100 MILLILITER(S): at 23:38

## 2024-11-03 RX ADMIN — NOREPINEPHRINE BITARTRATE 8.48 MICROGRAM(S)/KG/MIN: 1 INJECTION, SOLUTION, CONCENTRATE INTRAVENOUS at 12:40

## 2024-11-03 RX ADMIN — Medication 2800 MILLILITER(S): at 10:58

## 2024-11-03 NOTE — ED PROVIDER NOTE - PROGRESS NOTE DETAILS
Juan Pablo - s/o Dr. yoon Notified by RN patient hypotensive, pressure bag fluids running. Patient remained hypotensive despite fluid boluses, peripheral Levophed started with improvement in BP. Family notifying staff that patient now hypoxic to 89%, POCUS shows no B-lines, lungs clear to auscultation. Patient placed on HFNC with improvement in saturation. Called by radiology regarding findings on CT scan, family updated regarding results and surgery consulted. Evaluated by surgery at bedside, NG tube placed, recommend no surgical intervention at this time.  Will admit patient to medicine for further management. Case discussed with ICU fellow, patient approved for admission to stepdown unit.

## 2024-11-03 NOTE — ED ADULT NURSE NOTE - CHIEF COMPLAINT QUOTE
BIBA from [facility] for hypotension since yesterday , b/l arm swelling and abdominal swelling/ abdominal pain  x4 days

## 2024-11-03 NOTE — ED ADULT NURSE NOTE - OBJECTIVE STATEMENT
GIOVANA from [facility] for hypotension and abd distension. pt has slurred speech at baseline.

## 2024-11-03 NOTE — H&P ADULT - NSHPPHYSICALEXAM_GEN_ALL_CORE
GENERAL: elderly appearing, on bipap  HEENT: Normocephalic, atraumatic  PULMONARY: Clear to auscultation bilaterally. No rales, ronchi, or wheezing.   CARDIOVASCULAR: Regular rate and rhythm, S1-S2, no murmurs   GASTROINTESTINAL: Soft, non-tender, non-distended, no guarding.   SKIN/EXTREMITIES: No LE edema b/l  NEUROLOGIC: AAOX3

## 2024-11-03 NOTE — ED ADULT NURSE NOTE - NSICDXPASTMEDICALHX_GEN_ALL_CORE_FT
PAST MEDICAL HISTORY:  Active asthma     Afib     Diabetes mellitus     Gastroesophageal reflux disease     Generalized OA     HTN (hypertension)     Obesity

## 2024-11-03 NOTE — ED PROVIDER NOTE - CARE PLAN
1 Principal Discharge DX:	Sepsis due to urinary tract infection  Secondary Diagnosis:	NOLA (acute kidney injury)

## 2024-11-03 NOTE — ED PROVIDER NOTE - ATTENDING CONTRIBUTION TO CARE
73-year-old female history of A-fib diabetes previous cholecystectomy presents for ration of decreased p.o. intake abdominal discomfort.  No fever chills.  Patient has not been for the past few days as per son.  Patient noted to be hypotensive brought to the ED for evaluation.  In addition patient with bilateral arm swelling and abdominal distention.  On exam abdomen is tympanic diffusely distended.  S1-S2, rales bl bases slurred speech at baseline,     impression  Abdominal pain with abdominal distension with hypotension.  Blood pressure resolved  without intervention bedside sono concerning for sbo..  Labs CT imaging reeval.

## 2024-11-03 NOTE — ED PROVIDER NOTE - OBJECTIVE STATEMENT
73-year-old female PMH A-fib, diabetes, HTN, s/p cholecystectomy, congenital solitary kidney presents to the ED for evaluation of decreased p.o. intake, abdominal discomfort and hypotension.  Per patient's son patient has not been feeling well over the last few days, and daughter reports she was complaining of heartburn, nausea and had an episode of emesis about 5 days ago.  States they thought patient has had a stomach bug, encouraged patient to seek medical evaluation however patient declined at that time.  Patient has been more tired, has not been getting out of bed over the last 3 days which is unusual for her.  At the nursing facility, patient noted to be hypotensive with an elevated white blood cell count and sent to the ED for further evaluation.  Patient also noted by nursing home staff to have bilateral upper extremity swelling and abdominal distention.  Patient denies fevers, chills, chest pain, shortness of breath, dizziness, syncope/near syncope or any urinary symptoms.  Son reports patient has not had a bowel movement in 3 days.

## 2024-11-03 NOTE — ED PROVIDER NOTE - PHYSICAL EXAMINATION
VITAL SIGNS: I have reviewed nursing notes and confirm.  CONSTITUTIONAL: chronically ill-appearing  SKIN: Warm dry  HEAD: NCAT  EYES: EOMI, PERRL  ENT: Moist mucous membranes  NECK: Supple; non tender.  CARD: RRR  RESP: clear to ausculation b/l. No wheezes or rales. No increased WOB.  ABD: soft, non-tender, distended and tympanitic.  RECTAL: Normal sphincter tone; no external hemorrhoids or fissures; vault without blood  EXT: Full ROM, no bony tenderness, no pedal edema  NEURO: Grossly intact. A&O x4  PSYCH: Cooperative, appropriate.

## 2024-11-03 NOTE — ED ADULT TRIAGE NOTE - CHIEF COMPLAINT QUOTE
BIBA from [facility] for hypotension since yesterday , b/l arm swelling and abdominal swelling/ abdominal pain  x4 days

## 2024-11-03 NOTE — H&P ADULT - NSHPLABSRESULTS_GEN_ALL_CORE
13.9   19.29 )-----------( 274      ( 03 Nov 2024 11:10 )             42.7   11-03    133[L]  |  95[L]  |  97[HH]  ----------------------------<  139[H]  4.1   |  17  |  4.3[HH]    Ca    10.0      03 Nov 2024 11:10    TPro  6.1  /  Alb  3.7  /  TBili  1.2  /  DBili  x   /  AST  18  /  ALT  33  /  AlkPhos  124[H]  11-03

## 2024-11-03 NOTE — H&P ADULT - ASSESSMENT
73-year-old female PMH A-fib on xarelto,  HTN, s/p cholecystectomy, congenital solitary kidney presents from NH to the ED for evaluation of weakness, decreased PO intake and abdominal distension      IMPRESSION:    #Septic shock 2/2 cystitis vs intraabdominal infection  #HAGMA 2/2 lactic acidosis and uremia  #NOLA likely ATN 2/2 septic shock  #Afib on xarelto  #HTN      PLAN:    CNS: avoid oversedation    HEENT: Oral care    PULMONARY:  HOB @ 45 degrees, on bipap    CARDIOVASCULAR:    GI: GI prophylaxis.  Feeding     RENAL:  Follow up lytes.  Correct as needed    INFECTIOUS DISEASE: Follow up cultures    HEMATOLOGICAL:  DVT prophylaxis.    ENDOCRINE:  Follow up FS.  Insulin protocol if needed.    MUSCULOSKELETAL:         73-year-old female PMH A-fib on xarelto,  HTN, s/p cholecystectomy, congenital solitary kidney presents from NH to the ED for evaluation of weakness, decreased PO intake and abdominal distension      IMPRESSION:    #Septic shock 2/2 cystitis vs intraabdominal infection  #HAGMA 2/2 lactic acidosis and uremia  #NOLA likely ATN 2/2 septic shock  #Afib on xarelto  #HTN      PLAN:    CNS: avoid oversedation    HEENT: Oral care    PULMONARY:  HOB @ 45 degrees, VBG's noted, wean of bipap as tolerated    CARDIOVASCULAR: avoid overload, hold home BP meds for now,  titrate levo to goal MAP of 65, wean levo as tolerated    GI: GI prophylaxis, NPO since on bipap, no intervention per surgery, 3.1L feculent output on suctioning, GI consult    RENAL:  Follow up lytes.  Correct as needed    INFECTIOUS DISEASE: Follow up cultures, hx of ESBL proteus, will give freddie/vanc for now, ID eval    HEMATOLOGICAL:  hold xarelto for now, switch to therapeutic AC via heparin for now    ENDOCRINE:  Follow up FS.  Insulin protocol if needed.    MUSCULOSKELETAL: AAT         73-year-old female PMH A-fib on xarelto,  HTN, s/p cholecystectomy, congenital solitary kidney presents from NH to the ED for evaluation of weakness, decreased PO intake and abdominal distension      IMPRESSION:    #Septic shock 2/2 cystitis vs intraabdominal infection  #HAGMA 2/2 lactic acidosis and uremia  #NOLA likely ATN 2/2 septic shock  #Afib on xarelto  #HTN      PLAN:    CNS: avoid oversedation    HEENT: Oral care    PULMONARY:  HOB @ 45 degrees, VBG's noted, wean of bipap as tolerated, trend lactate    CARDIOVASCULAR: avoid overload, hold home BP meds for now,  titrate levo to goal MAP of 65, wean levo as tolerated    GI: GI prophylaxis, NPO since on bipap, no intervention per surgery, 3.1L feculent output on suctioning, notify surgery if worsening abdominal pain, GI consult    RENAL:  Follow up lytes.  Correct as needed    INFECTIOUS DISEASE: Follow up cultures, hx of ESBL proteus, will give freddie/vanc for now, ID eval    HEMATOLOGICAL:  hold xarelto for now, switch to therapeutic AC via heparin for now    ENDOCRINE:  Follow up FS.  Insulin protocol if needed.    MUSCULOSKELETAL: AAT         73-year-old female PMH A-fib on xarelto,  HTN, s/p cholecystectomy, congenital solitary kidney presents from NH to the ED for evaluation of weakness, decreased PO intake and abdominal distension      IMPRESSION:    #Septic shock 2/2 cystitis vs intraabdominal infection  #HAGMA 2/2 lactic acidosis and uremia  #NOLA likely ATN 2/2 septic shock  #Afib on xarelto  #HTN      PLAN:    CNS: avoid oversedation    HEENT: Oral care    PULMONARY:  HOB @ 45 degrees, VBG's noted, wean of bipap as tolerated, trend lactate    CARDIOVASCULAR: avoid overload, hold home BP meds for now,  titrate levo to goal MAP of 65, wean levo as tolerated    GI: GI prophylaxis, NPO since on bipap, no intervention per surgery, 3.1L feculent output on suctioning, notify surgery if worsening abdominal pain, GI consult    RENAL:  Follow up lytes.  Correct as needed    INFECTIOUS DISEASE: Follow up cultures, hx of ESBL proteus, will give freddie/vanc for now, ID eval    HEMATOLOGICAL:  hold xarelto for now, switch to heparin gtt given CrCl <30    ENDOCRINE:  Follow up FS.  Insulin protocol if needed.    MUSCULOSKELETAL: AAT

## 2024-11-03 NOTE — ED ADULT NURSE NOTE - NSFALLHARMRISKINTERV_ED_ALL_ED
Assistance OOB with selected safe patient handling equipment if applicable/Assistance with ambulation/Communicate risk of Fall with Harm to all staff, patient, and family/Monitor gait and stability/Provide visual cue: red socks, yellow wristband, yellow gown, etc/Reinforce activity limits and safety measures with patient and family/Bed in lowest position, wheels locked, appropriate side rails in place/Call bell, personal items and telephone in reach/Instruct patient to call for assistance before getting out of bed/chair/stretcher/Non-slip footwear applied when patient is off stretcher/[place] to call system/Physically safe environment - no spills, clutter or unnecessary equipment/Purposeful Proactive Rounding/Room/bathroom lighting operational, light cord in reach

## 2024-11-03 NOTE — ED PROVIDER NOTE - CLINICAL SUMMARY MEDICAL DECISION MAKING FREE TEXT BOX
Patient presented with N/V, weakness, found to be hypotensive with elevated WBCs outpatient so sent to ED. On arrival patient initially afebrile but (+) profoundly hypotensive requiring immediate attention. Patient seen on arrival at which time patient was given IVF and IV abx. Labs obtained and remarkable for (+) marked leukocytosis to 19k as well as (+) NOLA with concurrent metabolic acidosis. UA (+) for UTI which is likely source of sepsis. CXR with b/l effusions but no focal consolidations. During ED course patient became hypotensive again despite IVF, ultimately requiring pressors with improvement. CT abd/pelvis revealed multiple abnormalities, primarily (+) new evidence of gas in gastric wall compatible with possible ischemic process, as well as air in the bladder. Surgery was consulted and evaluated patient in the ED - no surgical intervention but will follow. Patient remained stable on levophed thereafter. Consulted ICU who will admit to stepdown at this time with surgery following for further management. HD stable at time of admission.

## 2024-11-03 NOTE — CONSULT NOTE ADULT - ASSESSMENT
ASSESSMENT:  73-year-old female PMH A-fib, diabetes, HTN, COVID in 2020 requiring intubation with multiple complications, s/p cholecystectomy, congenital solitary kidney presents to the ED from [facility] (she lives there) for evaluation of decreased p.o. intake, abdominal discomfort and distention, vomiting x 5 days and hypotension x 1 day. CT abdomen and pelvis revealed severe gastric distention extending to the level of the first/second portion of duodenum, with new pneumatosis along the posterior gastric wall, suspicious for ischemia; multiple adjacent foci of free intraperitoneal air are noted, new small linear foci of air within the peripheral liver, suspicious for portal venous gas, thick-walled urinary bladder containing multiple foci of air; findings may be related to recent intervention and/or underlying cystitis, however please note colovesicular fistula can have this appearance. Surgery was consulted for these findings. NGT placed with 3.1L feculent output on insertion. Physical exam findings, imaging, and labs as documented above.     PLAN:  #severe gastric distention   - No acute surgical intervention at this time.   - Recommend gastric decompression, continue NGT to low continuous wall suction; flush ports with air regularly to enhance function.   - Recommend aggressive resuscitation.   - Recommend strict ins and out, recommend ya catheter placement.   - Continue broad spectrum antibiotics.   - Strict aspiration precautions.   - Hemodynamic and respiratory monitoring.   - Rest of care per primary team.   - Surgery will follow.     Above plan discussed with Attending Surgeon Dr. Pedersen, patient, patient family, and Primary team  11-03-24 @ 15:27     ASSESSMENT:  73-year-old female PMH A-fib, diabetes, HTN, COVID in 2020 requiring intubation with multiple complications, s/p cholecystectomy, congenital solitary kidney presents to the ED from [facility] (she lives there) for evaluation of decreased p.o. intake, abdominal discomfort and distention, vomiting x 5 days and hypotension x 1 day. CT abdomen and pelvis revealed severe gastric distention extending to the level of the first/second portion of duodenum, with new pneumatosis along the posterior gastric wall, suspicious for ischemia; multiple adjacent foci of free intraperitoneal air are noted, new small linear foci of air within the peripheral liver, suspicious for portal venous gas, thick-walled urinary bladder containing multiple foci of air; findings may be related to recent intervention and/or underlying cystitis, however please note colovesicular fistula can have this appearance. Surgery was consulted for these findings. NGT placed with 3.1L feculent output on insertion. Physical exam findings, imaging, and labs as documented above.     PLAN:  #Severe gastric distention   - No acute surgical intervention at this time.   - Recommend gastric decompression, continue NGT to low continuous wall suction; flush ports with air regularly to enhance function.   - Recommend GI consult to investigate gastric mucosa given emphysematous changes  - Recommend aggressive resuscitation.   - Recommend strict ins and out, recommend ya catheter placement  - Continue broad spectrum antibiotics.   - Strict aspiration precautions.   - Hemodynamic and respiratory monitoring.   - Rest of care per primary team.   - Surgery will follow.     Above plan discussed with Attending Surgeon Dr. Pedersen, patient, patient family, and Primary team  11-03-24 @ 15:27     ASSESSMENT:  73-year-old female PMH A-fib, diabetes, HTN, COVID in 2020 requiring intubation with multiple complications, s/p cholecystectomy, congenital solitary kidney presents to the ED from [facility] (she lives there) for evaluation of decreased p.o. intake, abdominal discomfort and distention, vomiting x 5 days and hypotension x 1 day. CT abdomen and pelvis revealed severe gastric distention extending to the level of the first/second portion of duodenum, with new pneumatosis along the posterior gastric wall, suspicious for ischemia; multiple adjacent foci of free intraperitoneal air are noted, new small linear foci of air within the peripheral liver, suspicious for portal venous gas, thick-walled urinary bladder containing multiple foci of air; findings may be related to recent intervention and/or underlying cystitis, however please note colovesicular fistula can have this appearance. Surgery was consulted for these findings. NGT placed with 3.1L feculent output on insertion. Physical exam findings, imaging, and labs as documented above.     PLAN:  #Severe gastric distention   - No acute surgical intervention at this time.   - Recommend gastric decompression, continue NGT to low continuous wall suction; flush ports with air regularly to enhance function.   - Recommend IV PPI  - Recommend GI consult to investigate gastric mucosa given emphysematous changes  - Recommend aggressive resuscitation.   - Recommend strict ins and out, recommend ya catheter placement  - Continue broad spectrum antibiotics.   - Strict aspiration precautions.   - Hemodynamic and respiratory monitoring.   - Rest of care per primary team.   - Surgery will follow.     Above plan discussed with Attending Surgeon Dr. Pedersen, patient, patient family, and Primary team  11-03-24 @ 15:27

## 2024-11-03 NOTE — ED ADULT NURSE REASSESSMENT NOTE - NS ED NURSE REASSESS COMMENT FT1
Pt was 84% on highflor 100% o2, md Bassett and respiratory therapist made aware, as per md will switch over to bipap.

## 2024-11-03 NOTE — H&P ADULT - HISTORY OF PRESENT ILLNESS
73-year-old female PMH A-fib on xarelto, DM, HTN, s/p cholecystectomy, congenital solitary kidney presents to the ED for evaluation of decreased p.o. intake, abdominal discomfort and hypotension.  Per patient's son patient has not been feeling well over the last few days, and daughter reports she was complaining of heartburn, nausea and had an episode of emesis about 5 days ago.  States they thought patient has had a stomach bug, encouraged patient to seek medical evaluation however patient declined at that time.  Patient has been more tired, has not been getting out of bed over the last 3 days which is unusual for her.  At the nursing facility, patient noted to be hypotensive with an elevated white blood cell count and sent to the ED for further evaluation.  Patient also noted by nursing home staff to have bilateral upper extremity swelling and abdominal distention.  Patient denies fevers, chills, chest pain, shortness of breath, dizziness, syncope/near syncope or any urinary symptoms.  Son reports patient has not had a bowel movement in 3 days. 73-year-old female PMH A-fib on xarelto, DM, HTN, s/p cholecystectomy, congenital solitary kidney presents from NH to the ED for evaluation of weakness, decreased PO intake and abdominal distension. Pt's son Everton says pt has not been feeling well the past few days, and has had poor appetite which is unlike her. She's been more tired and weak, unable to get out of bed. Has not had a bowel movement in 3 days, pt unsure if she's been passing gas otherwise. She was noted to be hypotensive and have an elevated wbc count at the NH as well. No dysuria, chest pain, SOB, cough or fever per pt otherwise.  No other complaints currently.     In the ED, BP 66/45 S/P LR 2800cc bolus with no improvement in BP, started on peripheral levo. Labs with wbc 19.29, vbg lactate 2.0-->2.3, pH 7.2, pCO2 45, Na 133, AG 21, Cr 4.3 (baseline 1.1), UA contaminated. CTAP  73-year-old female PMH A-fib on xarelto, DM, HTN, s/p cholecystectomy, congenital solitary kidney presents from NH to the ED for evaluation of weakness, decreased PO intake and abdominal distension. Pt's son Everton says pt has not been feeling well the past few days, and has had poor appetite which is unlike her. She's been more tired and weak, unable to get out of bed. Has not had a bowel movement in 3 days, pt unsure if she's been passing gas otherwise. She was noted to be hypotensive and have an elevated wbc count at the NH as well. No dysuria, chest pain, SOB, cough or fever per pt otherwise.  No other complaints currently.     In the ED, BP 66/45 S/P LR 2800cc bolus with no improvement in BP, started on peripheral levo. Labs with wbc 19.29, vbg lactate 2.0-->2.3, pH 7.2, pCO2 45, Na 133, AG 21, Cr 4.3 (baseline 1.1), UA contaminated. CTAP with severe gastric distension, multiple foci of intraperitoneal free air and urinary bladder thickening with free air. Surgery consulted, no intervention for now, NGT placed for gastric decompression with 3.1L feculent output, recommend GI consult.      73-year-old female PMH A-fib on xarelto,, HTN, s/p cholecystectomy, congenital solitary kidney presents from NH to the ED for evaluation of weakness, decreased PO intake and abdominal distension. Pt's son Everton says pt has not been feeling well the past few days, and has had poor appetite which is unlike her. She's been more tired and weak, unable to get out of bed. Has not had a bowel movement in 3 days, pt unsure if she's been passing gas otherwise. She was noted to be hypotensive and have an elevated wbc count at the NH as well. No dysuria, chest pain, SOB, cough or fever per pt otherwise.  No other complaints currently.     In the ED, BP 66/45 S/P LR 2800cc bolus with no improvement in BP, started on peripheral levo. Labs with wbc 19.29, vbg lactate 2.0-->2.3, pH 7.2, pCO2 45, Na 133, AG 21, Cr 4.3 (baseline 1.1), UA contaminated. CTAP with severe gastric distension, multiple foci of intraperitoneal free air and urinary bladder thickening with free air. Surgery consulted, no intervention for now, NGT placed for gastric decompression with 3.1L feculent output, recommend GI consult.

## 2024-11-03 NOTE — CONSULT NOTE ADULT - SUBJECTIVE AND OBJECTIVE BOX
GENERAL SURGERY CONSULT NOTE    Patient: PATRICIA SPRAGUE , 73y (54)Female   MRN: [id number]  Location: [facility] ED  Visit: 24 Emergency  Date: 24 @ 15:27    HPI:   73-year-old female PMH A-fib, diabetes, HTN, COVID in  requiring intubation with multiple complications, s/p cholecystectomy, congenital solitary kidney presents to the ED from [facility] (she lives there) for evaluation of decreased p.o. intake, abdominal discomfort, vomiting and hypotension.  Per patient's daughter and son patient has not been feeling well over the last few days, and daughter reports she was complaining of heartburn, nausea and had an episode of emesis about 5 days ago (wednesday).  States they thought patient has had a stomach bug, encouraged patient to seek medical evaluation however patient declined at that time.  Patient has been more tired, has not been getting out of bed over the last 3 days which is unusual for her. Patient is wheelchair bound at baseline. At the nursing facility, patient noted to be hypotensive with an elevated white blood cell count today and was sent to the ED for further evaluation. Patient also noted by nursing home staff to have bilateral upper extremity swelling and abdominal distention. Son reports patient has not had a bowel movement in 3 days. In the ED, patient noted to be hypotensive to 66/45. She received 3L fluid bolus, was started on levophed. Patient had large bowel movement in the ED. CT abdomen and pelvis revealed severe gastric distention extending to the level of the first/second portion of duodenum, with new pneumatosis along the posterior gastric wall, suspicious for ischemia; multiple adjacent foci of free intraperitoneal air are noted, new small linear foci of air within the peripheral liver, suspicious for portal venous gas, thick-walled urinary bladder containing multiple foci of air; findings may be related to recent intervention and/or underlying cystitis, however please note colovesicular fistula can have this appearance. Surgery was consulted for these findings. Patient was seen and examined, on levophed 0.05 with BP in 80s/60s. Marked abdominal distention noted, NGT placed with 3.1L feculent output on insertion.     PAST MEDICAL & SURGICAL HISTORY:  HTN (hypertension)  Diabetes mellitus  Obesity  Gastroesophageal reflux disease  Active asthma  Generalized OA  Afib  H/O hernia repair  and revised in 2013  History of cholecystectomy      Home Medications:  acetaminophen 325 mg oral tablet: 2 tab(s) orally every 6 hours, As Needed - for fever, for moderate pain (2023 09:42)  carvedilol 6.25 mg oral tablet: 1 tab(s) orally 2 times a day (2023 09:42)  cholecalciferol oral tablet: 400 unit(s) orally once a day (2023 12:36)  loperamide 2 mg oral capsule: 1 cap(s) orally every 12 hours  ADMINISTER BEFORE MEALS  STOP AFTER 2 DOSES (2023 09:42)  losartan 25 mg oral tablet: 1 tab(s) orally once a day (2023 08:58)  pantoprazole 40 mg oral delayed release tablet: 1 tab(s) orally every 12 hours (2023 09:43)  rivaroxaban 20 mg oral tablet: 1 tab(s) orally once a day (before a meal) (2020 06:59)  saccharomyces boulardii lyo 250 mg oral capsule: 1 cap(s) orally 2 times a day (2023 14:43)    VITALS:  T(F): 100 (24 @ 13:55), Max: 100 (24 @ 13:55)  HR: 84 (24 @ 13:55) (84 - 94)  BP: 112/53 (24 @ 13:55) (66/45 - 115/91)  RR: 20 (24 @ 13:55) (18 - 20)  SpO2: 99% (24 @ 13:55) (94% - 99%)    PHYSICAL EXAM:  General: NAD, calm and cooperative  HEENT: NCAT, MAYCO, EOMI  Cardiac: S1, S2   Respiratory: No accessory muscle use on High flow nasal cannula. +rhonchi  Abdomen: Soft, distended, non-tender, no rebound, no guarding  Skin: Warm/dry, normal color, no jaundice    MEDICATIONS  (STANDING):  norepinephrine Infusion 0.05 MICROgram(s)/kG/Min (8.48 mL/Hr) IV Continuous <Continuous>    MEDICATIONS  (PRN):      LAB/STUDIES:                        13.9   19.29 )-----------( 274      ( 2024 11:10 )             42.7     11-03    133[L]  |  95[L]  |  97[HH]  ----------------------------<  139[H]  4.1   |  17  |  4.3[HH]    Ca    10.0      2024 11:10    TPro  6.1  /  Alb  3.7  /  TBili  1.2  /  DBili  x   /  AST  18  /  ALT  33  /  AlkPhos  124[H]  11-03    PT/INR - ( 2024 11:10 )   PT: 13.10 sec;   INR: 1.11 ratio         PTT - ( 2024 11:10 )  PTT:36.3 sec  LIVER FUNCTIONS - ( 2024 11:10 )  Alb: 3.7 g/dL / Pro: 6.1 g/dL / ALK PHOS: 124 U/L / ALT: 33 U/L / AST: 18 U/L / GGT: x           Urinalysis Basic - ( 2024 13:51 )    Color: Dark Yellow / Appearance: Turbid / S.020 / pH: x  Gluc: x / Ketone: Negative mg/dL  / Bili: Negative / Urobili: 0.2 mg/dL   Blood: x / Protein: 300 mg/dL / Nitrite: Negative   Leuk Esterase: Large / RBC: 98 /HPF / WBC >998 /HPF   Sq Epi: x / Non Sq Epi: 24 /HPF / Bacteria: Many /HPF      Urinalysis with Rflx Culture (collected 2024 13:51)      IMAGING:  CT Abdomen and Pelvis No Cont (24 @ 11:37) >  1. Severe gastric distention extending to the level of the first/second portion of duodenum, with new pneumatosis along the posterior gastric wall, suspicious for ischemia; multiple adjacent foci of free intraperitoneal air are noted.  2. New small linear foci of air within the peripheral liver, suspicious for portal venous gas.  3. Thick-walled urinary bladder containing multiple foci of air; findings may be related to recent intervention and/or underlying cystitis, however however please note colovesicular fistula can have this appearance. Clinical correlation is suggested.  4. Small bilateral pleural effusions with small bibasilar opacities/atelectasis.

## 2024-11-04 LAB
ALBUMIN SERPL ELPH-MCNC: 3.2 G/DL — LOW (ref 3.5–5.2)
ALP SERPL-CCNC: 149 U/L — HIGH (ref 30–115)
ALT FLD-CCNC: 55 U/L — HIGH (ref 0–41)
ANION GAP SERPL CALC-SCNC: 23 MMOL/L — HIGH (ref 7–14)
APPEARANCE UR: ABNORMAL
APTT BLD: 149 SEC — CRITICAL HIGH (ref 27–39.2)
AST SERPL-CCNC: 39 U/L — SIGNIFICANT CHANGE UP (ref 0–41)
B-OH-BUTYR SERPL-SCNC: <0.2 MMOL/L — SIGNIFICANT CHANGE UP
BACTERIA # UR AUTO: NEGATIVE /HPF — SIGNIFICANT CHANGE UP
BASOPHILS # BLD AUTO: 0.07 K/UL — SIGNIFICANT CHANGE UP (ref 0–0.2)
BASOPHILS NFR BLD AUTO: 0.4 % — SIGNIFICANT CHANGE UP (ref 0–1)
BILIRUB SERPL-MCNC: 1.6 MG/DL — HIGH (ref 0.2–1.2)
BILIRUB UR-MCNC: NEGATIVE — SIGNIFICANT CHANGE UP
BUN SERPL-MCNC: 102 MG/DL — CRITICAL HIGH (ref 10–20)
CALCIUM SERPL-MCNC: 9.3 MG/DL — SIGNIFICANT CHANGE UP (ref 8.4–10.5)
CAST: 33 /LPF — HIGH (ref 0–4)
CHLORIDE SERPL-SCNC: 97 MMOL/L — LOW (ref 98–110)
CO2 SERPL-SCNC: 19 MMOL/L — SIGNIFICANT CHANGE UP (ref 17–32)
COLOR SPEC: YELLOW — SIGNIFICANT CHANGE UP
CREAT SERPL-MCNC: 4.2 MG/DL — CRITICAL HIGH (ref 0.7–1.5)
D DIMER BLD IA.RAPID-MCNC: 1061 NG/ML DDU — HIGH
DIFF PNL FLD: ABNORMAL
EGFR: 11 ML/MIN/1.73M2 — LOW
EOSINOPHIL # BLD AUTO: 0.03 K/UL — SIGNIFICANT CHANGE UP (ref 0–0.7)
EOSINOPHIL NFR BLD AUTO: 0.2 % — SIGNIFICANT CHANGE UP (ref 0–8)
GAS PNL BLDA: SIGNIFICANT CHANGE UP
GLUCOSE SERPL-MCNC: 243 MG/DL — HIGH (ref 70–99)
GLUCOSE UR QL: NEGATIVE MG/DL — SIGNIFICANT CHANGE UP
HCT VFR BLD CALC: 41.1 % — SIGNIFICANT CHANGE UP (ref 37–47)
HGB BLD-MCNC: 13.8 G/DL — SIGNIFICANT CHANGE UP (ref 12–16)
IMM GRANULOCYTES NFR BLD AUTO: 0.6 % — HIGH (ref 0.1–0.3)
KETONES UR-MCNC: NEGATIVE MG/DL — SIGNIFICANT CHANGE UP
LACTATE SERPL-SCNC: 1.5 MMOL/L — SIGNIFICANT CHANGE UP (ref 0.7–2)
LACTATE SERPL-SCNC: 3.2 MMOL/L — HIGH (ref 0.7–2)
LEUKOCYTE ESTERASE UR-ACNC: ABNORMAL
LYMPHOCYTES # BLD AUTO: 11.2 % — LOW (ref 20.5–51.1)
LYMPHOCYTES # BLD AUTO: 2.11 K/UL — SIGNIFICANT CHANGE UP (ref 1.2–3.4)
MAGNESIUM SERPL-MCNC: 1.5 MG/DL — LOW (ref 1.8–2.4)
MCHC RBC-ENTMCNC: 30.1 PG — SIGNIFICANT CHANGE UP (ref 27–31)
MCHC RBC-ENTMCNC: 33.6 G/DL — SIGNIFICANT CHANGE UP (ref 32–37)
MCV RBC AUTO: 89.5 FL — SIGNIFICANT CHANGE UP (ref 81–99)
MONOCYTES # BLD AUTO: 1.57 K/UL — HIGH (ref 0.1–0.6)
MONOCYTES NFR BLD AUTO: 8.3 % — SIGNIFICANT CHANGE UP (ref 1.7–9.3)
MRSA PCR RESULT.: NEGATIVE — SIGNIFICANT CHANGE UP
NEUTROPHILS # BLD AUTO: 14.92 K/UL — HIGH (ref 1.4–6.5)
NEUTROPHILS NFR BLD AUTO: 79.3 % — HIGH (ref 42.2–75.2)
NITRITE UR-MCNC: NEGATIVE — SIGNIFICANT CHANGE UP
NRBC # BLD: 0 /100 WBCS — SIGNIFICANT CHANGE UP (ref 0–0)
OSMOLALITY SERPL: 316 MOS/KG — HIGH (ref 280–301)
OSMOLALITY UR: 372 MOS/KG — SIGNIFICANT CHANGE UP (ref 50–1200)
PH UR: 5.5 — SIGNIFICANT CHANGE UP (ref 5–8)
PLATELET # BLD AUTO: 161 K/UL — SIGNIFICANT CHANGE UP (ref 130–400)
PMV BLD: 12.3 FL — HIGH (ref 7.4–10.4)
POTASSIUM SERPL-MCNC: 3.5 MMOL/L — SIGNIFICANT CHANGE UP (ref 3.5–5)
POTASSIUM SERPL-SCNC: 3.5 MMOL/L — SIGNIFICANT CHANGE UP (ref 3.5–5)
POTASSIUM UR-SCNC: 21 MMOL/L — SIGNIFICANT CHANGE UP
PROT SERPL-MCNC: 5.3 G/DL — LOW (ref 6–8)
PROT UR-MCNC: 30 MG/DL
RBC # BLD: 4.59 M/UL — SIGNIFICANT CHANGE UP (ref 4.2–5.4)
RBC # FLD: 13.2 % — SIGNIFICANT CHANGE UP (ref 11.5–14.5)
RBC CASTS # UR COMP ASSIST: 1 /HPF — SIGNIFICANT CHANGE UP (ref 0–4)
SODIUM SERPL-SCNC: 139 MMOL/L — SIGNIFICANT CHANGE UP (ref 135–146)
SODIUM UR-SCNC: 42 MMOL/L — SIGNIFICANT CHANGE UP
SP GR SPEC: 1.01 — SIGNIFICANT CHANGE UP (ref 1–1.03)
SQUAMOUS # UR AUTO: 9 /HPF — HIGH (ref 0–5)
UROBILINOGEN FLD QL: 0.2 MG/DL — SIGNIFICANT CHANGE UP (ref 0.2–1)
VANCOMYCIN FLD-MCNC: 8.2 UG/ML — SIGNIFICANT CHANGE UP (ref 5–10)
WBC # BLD: 18.81 K/UL — HIGH (ref 4.8–10.8)
WBC # FLD AUTO: 18.81 K/UL — HIGH (ref 4.8–10.8)
WBC UR QL: 51 /HPF — HIGH (ref 0–5)

## 2024-11-04 PROCEDURE — 99291 CRITICAL CARE FIRST HOUR: CPT

## 2024-11-04 PROCEDURE — 99232 SBSQ HOSP IP/OBS MODERATE 35: CPT

## 2024-11-04 PROCEDURE — 74018 RADEX ABDOMEN 1 VIEW: CPT | Mod: 26

## 2024-11-04 PROCEDURE — 99254 IP/OBS CNSLTJ NEW/EST MOD 60: CPT | Mod: GC

## 2024-11-04 PROCEDURE — 99222 1ST HOSP IP/OBS MODERATE 55: CPT

## 2024-11-04 PROCEDURE — 71045 X-RAY EXAM CHEST 1 VIEW: CPT | Mod: 26

## 2024-11-04 RX ORDER — HEPARIN SODIUM,PORCINE 1000/ML
3500 VIAL (ML) INJECTION EVERY 6 HOURS
Refills: 0 | Status: DISCONTINUED | OUTPATIENT
Start: 2024-11-04 | End: 2024-11-04

## 2024-11-04 RX ORDER — FLUCONAZOLE 200 MG/1
TABLET ORAL
Refills: 0 | Status: DISCONTINUED | OUTPATIENT
Start: 2024-11-04 | End: 2024-11-04

## 2024-11-04 RX ORDER — FLUCONAZOLE 200 MG/1
400 TABLET ORAL ONCE
Refills: 0 | Status: COMPLETED | OUTPATIENT
Start: 2024-11-04 | End: 2024-11-04

## 2024-11-04 RX ORDER — FLUCONAZOLE 200 MG/1
200 TABLET ORAL EVERY 24 HOURS
Refills: 0 | Status: DISCONTINUED | OUTPATIENT
Start: 2024-11-05 | End: 2024-11-09

## 2024-11-04 RX ORDER — HEPARIN SODIUM,PORCINE 1000/ML
7500 VIAL (ML) INJECTION EVERY 6 HOURS
Refills: 0 | Status: DISCONTINUED | OUTPATIENT
Start: 2024-11-04 | End: 2024-11-04

## 2024-11-04 RX ORDER — HEPARIN SODIUM,PORCINE 1000/ML
VIAL (ML) INJECTION
Qty: 25000 | Refills: 0 | Status: DISCONTINUED | OUTPATIENT
Start: 2024-11-04 | End: 2024-11-04

## 2024-11-04 RX ADMIN — FLUCONAZOLE 400 MILLIGRAM(S): 200 TABLET ORAL at 15:26

## 2024-11-04 RX ADMIN — MEROPENEM 100 MILLIGRAM(S): 500 INJECTION, POWDER, FOR SOLUTION INTRAVENOUS at 17:57

## 2024-11-04 RX ADMIN — MEROPENEM 100 MILLIGRAM(S): 500 INJECTION, POWDER, FOR SOLUTION INTRAVENOUS at 07:48

## 2024-11-04 RX ADMIN — Medication 25 GRAM(S): at 15:07

## 2024-11-04 RX ADMIN — NOREPINEPHRINE BITARTRATE 8.48 MICROGRAM(S)/KG/MIN: 1 INJECTION, SOLUTION, CONCENTRATE INTRAVENOUS at 01:33

## 2024-11-04 RX ADMIN — PANTOPRAZOLE SODIUM 40 MILLIGRAM(S): 40 TABLET, DELAYED RELEASE ORAL at 15:27

## 2024-11-04 RX ADMIN — MEROPENEM 100 MILLIGRAM(S): 500 INJECTION, POWDER, FOR SOLUTION INTRAVENOUS at 01:11

## 2024-11-04 RX ADMIN — NOREPINEPHRINE BITARTRATE 8.48 MICROGRAM(S)/KG/MIN: 1 INJECTION, SOLUTION, CONCENTRATE INTRAVENOUS at 12:48

## 2024-11-04 RX ADMIN — Medication 250 MILLIGRAM(S): at 12:48

## 2024-11-04 RX ADMIN — NOREPINEPHRINE BITARTRATE 8.48 MICROGRAM(S)/KG/MIN: 1 INJECTION, SOLUTION, CONCENTRATE INTRAVENOUS at 09:40

## 2024-11-04 RX ADMIN — Medication 1700 UNIT(S)/HR: at 00:32

## 2024-11-04 NOTE — PROGRESS NOTE ADULT - SUBJECTIVE AND OBJECTIVE BOX
PATRICIA SPRAGUE  73y, Female  Allergy: No Known Allergies      CHIEF COMPLAINT:     HPI:  73-year-old female PMH A-fib on xarelto,, HTN, s/p cholecystectomy, congenital solitary kidney presents from NH to the ED for evaluation of weakness, decreased PO intake and abdominal distension. Pt's son Everton says pt has not been feeling well the past few days, and has had poor appetite which is unlike her. She's been more tired and weak, unable to get out of bed. Has not had a bowel movement in 3 days, pt unsure if she's been passing gas otherwise. She was noted to be hypotensive and have an elevated wbc count at the NH as well. No dysuria, chest pain, SOB, cough or fever per pt otherwise.  No other complaints currently.     In the ED, BP 66/45 S/P LR 2800cc bolus with no improvement in BP, started on peripheral levo. Labs with wbc 19.29, vbg lactate 2.0-->2.3, pH 7.2, pCO2 45, Na 133, AG 21, Cr 4.3 (baseline 1.1), UA contaminated. CTAP with severe gastric distension, multiple foci of intraperitoneal free air and urinary bladder thickening with free air. Surgery consulted, no intervention for now, NGT placed for gastric decompression with 3.1L feculent output, recommend GI consult.      (03 Nov 2024 21:25)    FAMILY HISTORY:    PAST MEDICAL & SURGICAL HISTORY:  HTN (hypertension)      Diabetes mellitus      Obesity      Gastroesophageal reflux disease      Active asthma      Generalized OA      Afib      H/O hernia repair  2011 and revised in 2013      History of cholecystectomy          SOCIAL HISTORY    Substance Use (  ) never used  (  ) IVDU (  ) Other:  Tobacco Usage:  (   ) never smoked   (   ) former smoker   (   ) current smoker   Alcohol Usage: (   ) social  (   ) daily use (   ) denies  Sexual History:       ROS  CV: Denies CP, palpitations  PULM: Denies wheezing, hemoptysis  GI: Denies hematemesis, hematochezia, melena  : Denies discharge, hematuria  MSK: Denies arthralgias, myalgias    VITALS:  T(F): 98.1, Max: 100.5 (11-04-24 @ 02:08)  HR: 92  BP: 119/58  RR: 18Vital Signs Last 24 Hrs  T(C): 36.7 (04 Nov 2024 12:05), Max: 38.1 (04 Nov 2024 02:08)  T(F): 98.1 (04 Nov 2024 12:05), Max: 100.5 (04 Nov 2024 02:08)  HR: 92 (04 Nov 2024 12:05) (88 - 157)  BP: 119/58 (04 Nov 2024 12:05) (75/40 - 169/74)  BP(mean): 58 (04 Nov 2024 05:35) (54 - 98)  RR: 18 (04 Nov 2024 12:05) (18 - 19)  SpO2: 93% (04 Nov 2024 12:05) (93% - 100%)    Parameters below as of 04 Nov 2024 12:05  Patient On (Oxygen Delivery Method): nasal cannula, high flow        PHYSICAL EXAM:  Gen: ill appearing  CV: Regular rate & regular rhythm  Lungs: Air entry B/l equal, normal breathe sounds  Abdomen: Soft, distended  Neuro: non focal, awake  Skin: no rash, no erythema    TESTS & MEASUREMENTS:                        13.8   18.81 )-----------( 161      ( 04 Nov 2024 07:19 )             41.1     11-04    139  |  97[L]  |  102[HH]  ----------------------------<  243[H]  3.5   |  19  |  4.2[HH]    Ca    9.3      04 Nov 2024 07:19  Mg     1.5     11-04    TPro  5.3[L]  /  Alb  3.2[L]  /  TBili  1.6[H]  /  DBili  x   /  AST  39  /  ALT  55[H]  /  AlkPhos  149[H]  11-04      LIVER FUNCTIONS - ( 04 Nov 2024 07:19 )  Alb: 3.2 g/dL / Pro: 5.3 g/dL / ALK PHOS: 149 U/L / ALT: 55 U/L / AST: 39 U/L / GGT: x           Urinalysis Basic - ( 04 Nov 2024 07:19 )    Color: x / Appearance: x / SG: x / pH: x  Gluc: 243 mg/dL / Ketone: x  / Bili: x / Urobili: x   Blood: x / Protein: x / Nitrite: x   Leuk Esterase: x / RBC: x / WBC x   Sq Epi: x / Non Sq Epi: x / Bacteria: x        Urinalysis with Rflx Culture (collected 11-03-24 @ 13:51)        Lactate, Blood: 3.2 mmol/L (11-04-24 @ 07:19)  Lactate, Blood: 2.9 mmol/L (11-03-24 @ 22:37)  Blood Gas Venous - Lactate: 2.3 mmol/L (11-03-24 @ 19:09)  Lactate, Blood: 2.5 mmol/L (11-03-24 @ 15:33)  Blood Gas Venous - Lactate: 2.0 mmol/L (11-03-24 @ 11:54)  Lactate, Blood: 2.2 mmol/L (11-03-24 @ 11:10)      INFECTIOUS DISEASES TESTING      RADIOLOGY & ADDITIONAL TESTS:  I have personally reviewed the last Chest xray  CXR  Xray Chest 1 View- PORTABLE-Urgent:   ACC: [id number] EXAM:  XR CHEST PORTABLE URGENT 1V   ORDERED BY: ALTA SOTO     PROCEDURE DATE:  11/03/2024          INTERPRETATION:  Clinical History / Reason for exam: Enteric tube   placement.    Comparison : Chest radiograph 11/3/2024.    Technique/Positioning: Single frontal chest x-ray obtained.    Findings:    Support devices: Enteric tube extends below diaphragm..    Cardiac/mediastinum/hilum: Unchanged.    Lung parenchyma/Pleura: Unchanged bibasilar opacities.    Skeleton/soft tissues: Unchanged.    Impression:    Enteric tube extends below diaphragm..    --- End of Report ---            JAY ROBLES MD; Attending Radiologist  This document has been electronically signed. Nov  3 2024  8:56PM (11-03-24 @ 17:18)  Xray Chest 1 View- PORTABLE-Urgent:   ACC: [id number] EXAM:  XR CHEST PORTABLE URGENT 1V   ORDERED BY: DAVEY ALLEN     PROCEDURE DATE:  11/03/2024          INTERPRETATION:  Clinical History / Reason for exam: Sepsis.    Comparison : Chest radiograph February 13, 2024.    Technique/Positioning: Single frontal chest x-ray obtained.    Findings:    Support devices: None.    Cardiac/mediastinum/hilum: Unchanged.    Lung parenchyma/Pleura: Small bilateral pleural effusions, opacities.    Skeleton/soft tissues: Unchanged.    Impression:  Small bilateral pleural effusions, opacities.    --- End of Report ---            JAY ROBLES MD; Attending Radiologist  This document has been electronically signed. Nov  3 2024  8:31PM (11-03-24 @ 11:32)      CT  CT Abdomen and Pelvis No Cont:   ACC: [id number] EXAM:  CT ABDOMEN AND PELVIS   ORDERED BY: DAVEY ALLEN     PROCEDURE DATE:  11/03/2024          INTERPRETATION:  CLINICAL INFORMATION: Abdominal pain.    COMPARISON: January 31, 2023.    CONTRAST/COMPLICATIONS:  IV Contrast: NONE  Oral Contrast: NONE  Complications: None reported at time of study completion    PROCEDURE:  CT of the Abdomen and Pelvis was performed.  Sagittal and coronal reformats were performed.    FINDINGS:  LOWER CHEST: Small bilateral pleural effusions with small bibasilar   opacities/atelectasis.    LIVER: New small linear foci of air within the peripheral liver,   suspicious for portal venous gas.  BILE DUCTS: Mildly dilated common bile duct, possibly related to   postcholecystectomy state.  GALLBLADDER: Status post cholecystectomy.  SPLEEN: Within normal limits.  PANCREAS: Within normal limits.  ADRENALS: Unchanged left adrenal gland nodule.  KIDNEYS/URETERS: Diminutive left kidney. A punctate right renal lower   pole calculus is noted. No right-sided hydronephrosis.  BLADDER: Thick-walled urinary bladder containing multiple foci of air.  REPRODUCTIVE ORGANS: Coarse uterine calcification, likely reflecting   calcified uterine fibroid.  BOWEL/PERITONEUM/RETROPERITONEUM: Severe gastric distention extending to   the level of the first/second portion of duodenum, with new pneumatosis   along the posterior gastric wall, suspicious for ischemia, with multiple   adjacent foci of free intraperitoneal air (3/138-302.)  VESSELS: Atherosclerotic vascular calcification.  LYMPH NODES: No enlarged abdominal or pelvic lymph nodes.  BONES: Degenerative changes of the spine.      IMPRESSION:    1. Severe gastric distention extending to the level of the first/second   portion of duodenum, with new pneumatosis along the posterior gastric   wall, suspicious for ischemia; multiple adjacent foci of free   intraperitoneal air are noted.  2. New small linear foci of air within the peripheral liver, suspicious   for portal venous gas.  3. Thick-walled urinary bladder containing multiple foci of air; findings   may be related to recent intervention and/or underlying cystitis, however   however please note colovesicular fistula can have this appearance.   Clinical correlation is suggested.  4. Small bilateral pleural effusions with small bibasilar   opacities/atelectasis.      Spoke with DAVEY ALLEN DO; Resident Arianna on 11/3/2024 12:44 PM with   readback.    --- End of Report ---            JET WALDEN MD; Attending Radiologist  This document has been electronically signed. Nov  3 2024  1:16PM (11-03-24 @ 11:37)      CARDIOLOGY TESTING      MEDICATIONS  dextrose 5% 1000  fluconAZOLE IVPB 400  fluconAZOLE IVPB   magnesium sulfate  IVPB 2  meropenem  IVPB   meropenem  IVPB 500  norepinephrine Infusion 0.05  pantoprazole  Injectable 40  vancomycin  IVPB 1000      ANTIBIOTICS:  fluconAZOLE IVPB 400 milliGRAM(s) IV Intermittent once  fluconAZOLE IVPB      meropenem  IVPB      meropenem  IVPB 500 milliGRAM(s) IV Intermittent every 12 hours  vancomycin  IVPB 1000 milliGRAM(s) IV Intermittent every 24 hours        ASSESSMENT  73y F admitted with Sepsis      HTN (hypertension)    Diabetes mellitus    Obesity    Gastroesophageal reflux disease    Active asthma    Generalized OA    Afib        PROBLEMS  #Septic shock  #Cystitis  #Severe gastric distension w/ pneumatosis along posterior gastric wall  #B/l pleural effusions  -UA postive  -WBC count 18k, lactate 3.2  -Fever overnight 100.5  -Previously colonized by ESBL    RECOMMENDATIONS  -Fu UCx and Bcx  -Cw meropenem, hold vanc  - Obtain Vanc trough in am  -Vanc dosing per pharm ID   PATRICIA SPRAGUE  73y, Female  Allergy: No Known Allergies      CHIEF COMPLAINT:     HPI:  73-year-old female PMH A-fib on xarelto,, HTN, s/p cholecystectomy, congenital solitary kidney presents from NH to the ED for evaluation of weakness, decreased PO intake and abdominal distension. Pt's son Everton says pt has not been feeling well the past few days, and has had poor appetite which is unlike her. She's been more tired and weak, unable to get out of bed. Has not had a bowel movement in 3 days, pt unsure if she's been passing gas otherwise. She was noted to be hypotensive and have an elevated wbc count at the NH as well. No dysuria, chest pain, SOB, cough or fever per pt otherwise.  No other complaints currently.     In the ED, BP 66/45 S/P LR 2800cc bolus with no improvement in BP, started on peripheral levo. Labs with wbc 19.29, vbg lactate 2.0-->2.3, pH 7.2, pCO2 45, Na 133, AG 21, Cr 4.3 (baseline 1.1), UA contaminated. CTAP with severe gastric distension, multiple foci of intraperitoneal free air and urinary bladder thickening with free air. Surgery consulted, no intervention for now, NGT placed for gastric decompression with 3.1L feculent output, recommend GI consult.      (03 Nov 2024 21:25)    FAMILY HISTORY:    PAST MEDICAL & SURGICAL HISTORY:  HTN (hypertension)      Diabetes mellitus      Obesity      Gastroesophageal reflux disease      Active asthma      Generalized OA      Afib      H/O hernia repair  2011 and revised in 2013      History of cholecystectomy          SOCIAL HISTORY    Substance Use (  ) never used  (  ) IVDU (  ) Other:  Tobacco Usage:  (   ) never smoked   (   ) former smoker   (   ) current smoker   Alcohol Usage: (   ) social  (   ) daily use (   ) denies  Sexual History:       ROS  CV: Denies CP, palpitations  PULM: Denies wheezing, hemoptysis  GI: Denies hematemesis, hematochezia, melena  : Denies discharge, hematuria  MSK: Denies arthralgias, myalgias    VITALS:  T(F): 98.1, Max: 100.5 (11-04-24 @ 02:08)  HR: 92  BP: 119/58  RR: 18Vital Signs Last 24 Hrs  T(C): 36.7 (04 Nov 2024 12:05), Max: 38.1 (04 Nov 2024 02:08)  T(F): 98.1 (04 Nov 2024 12:05), Max: 100.5 (04 Nov 2024 02:08)  HR: 92 (04 Nov 2024 12:05) (88 - 157)  BP: 119/58 (04 Nov 2024 12:05) (75/40 - 169/74)  BP(mean): 58 (04 Nov 2024 05:35) (54 - 98)  RR: 18 (04 Nov 2024 12:05) (18 - 19)  SpO2: 93% (04 Nov 2024 12:05) (93% - 100%)    Parameters below as of 04 Nov 2024 12:05  Patient On (Oxygen Delivery Method): nasal cannula, high flow        PHYSICAL EXAM:  Gen: ill appearing on BIPAP  HEENT NCAT  CV: Regular rate & regular rhythm  Lungs: Air entry B/l equal, normal breathe sounds  Abdomen: Soft, distended, decreased BS  Ext wwp   Psych normal affect  Neuro: non focal, awake  Skin: no rash, no erythema    TESTS & MEASUREMENTS:                        13.8   18.81 )-----------( 161      ( 04 Nov 2024 07:19 )             41.1     11-04    139  |  97[L]  |  102[HH]  ----------------------------<  243[H]  3.5   |  19  |  4.2[HH]    Ca    9.3      04 Nov 2024 07:19  Mg     1.5     11-04    TPro  5.3[L]  /  Alb  3.2[L]  /  TBili  1.6[H]  /  DBili  x   /  AST  39  /  ALT  55[H]  /  AlkPhos  149[H]  11-04      LIVER FUNCTIONS - ( 04 Nov 2024 07:19 )  Alb: 3.2 g/dL / Pro: 5.3 g/dL / ALK PHOS: 149 U/L / ALT: 55 U/L / AST: 39 U/L / GGT: x           Urinalysis Basic - ( 04 Nov 2024 07:19 )    Color: x / Appearance: x / SG: x / pH: x  Gluc: 243 mg/dL / Ketone: x  / Bili: x / Urobili: x   Blood: x / Protein: x / Nitrite: x   Leuk Esterase: x / RBC: x / WBC x   Sq Epi: x / Non Sq Epi: x / Bacteria: x        Urinalysis with Rflx Culture (collected 11-03-24 @ 13:51)        Lactate, Blood: 3.2 mmol/L (11-04-24 @ 07:19)  Lactate, Blood: 2.9 mmol/L (11-03-24 @ 22:37)  Blood Gas Venous - Lactate: 2.3 mmol/L (11-03-24 @ 19:09)  Lactate, Blood: 2.5 mmol/L (11-03-24 @ 15:33)  Blood Gas Venous - Lactate: 2.0 mmol/L (11-03-24 @ 11:54)  Lactate, Blood: 2.2 mmol/L (11-03-24 @ 11:10)      INFECTIOUS DISEASES TESTING      RADIOLOGY & ADDITIONAL TESTS:  I have personally reviewed the last Chest xray  CXR  Xray Chest 1 View- PORTABLE-Urgent:   ACC: [id number] EXAM:  XR CHEST PORTABLE URGENT 1V   ORDERED BY: ALTA SOTO     PROCEDURE DATE:  11/03/2024          INTERPRETATION:  Clinical History / Reason for exam: Enteric tube   placement.    Comparison : Chest radiograph 11/3/2024.    Technique/Positioning: Single frontal chest x-ray obtained.    Findings:    Support devices: Enteric tube extends below diaphragm..    Cardiac/mediastinum/hilum: Unchanged.    Lung parenchyma/Pleura: Unchanged bibasilar opacities.    Skeleton/soft tissues: Unchanged.    Impression:    Enteric tube extends below diaphragm..    --- End of Report ---            JAY ROBLES MD; Attending Radiologist  This document has been electronically signed. Nov  3 2024  8:56PM (11-03-24 @ 17:18)  Xray Chest 1 View- PORTABLE-Urgent:   ACC: [id number] EXAM:  XR CHEST PORTABLE URGENT 1V   ORDERED BY: DAVEY ALLEN     PROCEDURE DATE:  11/03/2024          INTERPRETATION:  Clinical History / Reason for exam: Sepsis.    Comparison : Chest radiograph February 13, 2024.    Technique/Positioning: Single frontal chest x-ray obtained.    Findings:    Support devices: None.    Cardiac/mediastinum/hilum: Unchanged.    Lung parenchyma/Pleura: Small bilateral pleural effusions, opacities.    Skeleton/soft tissues: Unchanged.    Impression:  Small bilateral pleural effusions, opacities.    --- End of Report ---            JAY ROBLES MD; Attending Radiologist  This document has been electronically signed. Nov  3 2024  8:31PM (11-03-24 @ 11:32)      CT  CT Abdomen and Pelvis No Cont:   ACC: [id number] EXAM:  CT ABDOMEN AND PELVIS   ORDERED BY: DAVEY ALLEN     PROCEDURE DATE:  11/03/2024          INTERPRETATION:  CLINICAL INFORMATION: Abdominal pain.    COMPARISON: January 31, 2023.    CONTRAST/COMPLICATIONS:  IV Contrast: NONE  Oral Contrast: NONE  Complications: None reported at time of study completion    PROCEDURE:  CT of the Abdomen and Pelvis was performed.  Sagittal and coronal reformats were performed.    FINDINGS:  LOWER CHEST: Small bilateral pleural effusions with small bibasilar   opacities/atelectasis.    LIVER: New small linear foci of air within the peripheral liver,   suspicious for portal venous gas.  BILE DUCTS: Mildly dilated common bile duct, possibly related to   postcholecystectomy state.  GALLBLADDER: Status post cholecystectomy.  SPLEEN: Within normal limits.  PANCREAS: Within normal limits.  ADRENALS: Unchanged left adrenal gland nodule.  KIDNEYS/URETERS: Diminutive left kidney. A punctate right renal lower   pole calculus is noted. No right-sided hydronephrosis.  BLADDER: Thick-walled urinary bladder containing multiple foci of air.  REPRODUCTIVE ORGANS: Coarse uterine calcification, likely reflecting   calcified uterine fibroid.  BOWEL/PERITONEUM/RETROPERITONEUM: Severe gastric distention extending to   the level of the first/second portion of duodenum, with new pneumatosis   along the posterior gastric wall, suspicious for ischemia, with multiple   adjacent foci of free intraperitoneal air (3/138-302.)  VESSELS: Atherosclerotic vascular calcification.  LYMPH NODES: No enlarged abdominal or pelvic lymph nodes.  BONES: Degenerative changes of the spine.      IMPRESSION:    1. Severe gastric distention extending to the level of the first/second   portion of duodenum, with new pneumatosis along the posterior gastric   wall, suspicious for ischemia; multiple adjacent foci of free   intraperitoneal air are noted.  2. New small linear foci of air within the peripheral liver, suspicious   for portal venous gas.  3. Thick-walled urinary bladder containing multiple foci of air; findings   may be related to recent intervention and/or underlying cystitis, however   however please note colovesicular fistula can have this appearance.   Clinical correlation is suggested.  4. Small bilateral pleural effusions with small bibasilar   opacities/atelectasis.      Spoke with DAVEY ALLEN DO; Resident Arianna on 11/3/2024 12:44 PM with   readback.    --- End of Report ---            JET WALDEN MD; Attending Radiologist  This document has been electronically signed. Nov  3 2024  1:16PM (11-03-24 @ 11:37)      CARDIOLOGY TESTING      MEDICATIONS  dextrose 5% 1000  fluconAZOLE IVPB 400  fluconAZOLE IVPB   magnesium sulfate  IVPB 2  meropenem  IVPB   meropenem  IVPB 500  norepinephrine Infusion 0.05  pantoprazole  Injectable 40  vancomycin  IVPB 1000      ANTIBIOTICS:  fluconAZOLE IVPB 400 milliGRAM(s) IV Intermittent once  fluconAZOLE IVPB      meropenem  IVPB      meropenem  IVPB 500 milliGRAM(s) IV Intermittent every 12 hours  vancomycin  IVPB 1000 milliGRAM(s) IV Intermittent every 24 hours

## 2024-11-04 NOTE — ED ADULT NURSE REASSESSMENT NOTE - NS ED NURSE REASSESS COMMENT FT1
Went into pt's room to assess need for cleaning, and to obtain ordered stool sample and rectal temp. Pt AOx4, refused to be turned Went into pt's room to assess need for cleaning, and to obtain ordered stool sample and rectal temp. Pt AOx4, refused to be turned despite education

## 2024-11-04 NOTE — CONSULT NOTE ADULT - SUBJECTIVE AND OBJECTIVE BOX
Gastroenterology Consultation:    Patient is a 73y old  Female who presents with a chief complaint of septic shock (04 Nov 2024 14:32)    Admitted on: 11-03-24    HPI:  73-year-old female PMH A-fib on xarelto,, HTN, s/p cholecystectomy, congenital solitary kidney presents from NH to the ED for evaluation of weakness, decreased PO intake and abdominal distension. Has not had a bowel movement in 3 days, pt unsure if she's been passing gas otherwise. She was noted to be hypotensive and have an elevated wbc count at the NH as well.  In the ED, BP 66/45 S/P LR started on  levo. Labs with wbc 19.29 CTAP with severe gastric distension, multiple foci of intraperitoneal free air and urinary bladder thickening with free air. Surgery consulted, no intervention for now, NGT placed for gastric decompression. GI was consulted for gastric distention and concern of ischemia on imaging.     Prior EGD/ Colonoscopy:  < from: EGD w/ PEG Placement (02.16.23 @ 10:30) >  Impressions:    Grade D esophagitis compatible with erosive esophagitis.    Erythema in the stomach compatible with non-erosive gastritis.    Hiatal Hernia.    Normal mucosa in the whole examined duodenum.    < end of copied text >      PAST MEDICAL & SURGICAL HISTORY:  HTN (hypertension)      Diabetes mellitus      Obesity      Gastroesophageal reflux disease      Active asthma      Generalized OA      Afib      H/O hernia repair  2011 and revised in 2013      History of cholecystectomy            FAMILY HISTORY:  No reported fhx of gi cancers     Social History:  No reported substance use     Home Medications:  acetaminophen 325 mg oral tablet: 2 tab(s) orally every 6 hours, As Needed - for fever, for moderate pain (03 Nov 2024 20:02)  alendronate 70 mg oral tablet: 1 tab(s) orally once a day (03 Nov 2024 20:01)  ascorbic acid 500 mg oral tablet: 1 tab(s) orally once a day (03 Nov 2024 20:01)  cholecalciferol oral tablet: 400 unit(s) orally once a day (03 Nov 2024 20:02)  Cozaar 100 mg oral tablet: 1 tab(s) orally once a day (03 Nov 2024 20:02)  cyclobenzaprine 10 mg oral tablet: 1 tab(s) orally once a day (03 Nov 2024 20:02)  famotidine 20 mg oral tablet: 1 tab(s) orally once a day (03 Nov 2024 20:02)  ibuprofen 400 mg oral tablet: 1 tab(s) orally once a day as needed for  moderate pain (03 Nov 2024 20:02)  labetalol 100 mg oral tablet: 1 tab(s) orally 3 times a day (03 Nov 2024 21:49)  loperamide 2 mg oral capsule: 1 cap(s) orally every 12 hours  ADMINISTER BEFORE MEALS  STOP AFTER 2 DOSES (03 Nov 2024 20:02)  magnesium hydroxide 400 mg oral tablet, chewable: orally once a day (03 Nov 2024 20:02)  Nifedical XL 60 mg oral tablet, extended release: 1 tab(s) orally once a day (03 Nov 2024 20:02)  Percocet 10 mg-325 mg oral tablet: 1 tab(s) orally every 8 hours as needed for  severe pain (03 Nov 2024 20:02)  polyethylene glycol 3350 oral powder for reconstitution: 17 gram(s) orally once a day (03 Nov 2024 20:02)  rivaroxaban 15 mg oral tablet: 1 tab(s) orally once a day (03 Nov 2024 19:55)  saccharomyces boulardii lyo 250 mg oral capsule: 1 cap(s) orally 2 times a day (03 Nov 2024 20:02)        MEDICATIONS  (STANDING):  dextrose 5% 1000 milliLiter(s) (100 mL/Hr) IV Continuous <Continuous>  fluconAZOLE IVPB      meropenem  IVPB      meropenem  IVPB 500 milliGRAM(s) IV Intermittent every 12 hours  norepinephrine Infusion 0.05 MICROgram(s)/kG/Min (8.48 mL/Hr) IV Continuous <Continuous>  pantoprazole  Injectable 40 milliGRAM(s) IV Push daily  vancomycin  IVPB 1000 milliGRAM(s) IV Intermittent every 24 hours    MEDICATIONS  (PRN):      Allergies  No Known Allergies      Review of Systems:   Constitutional:  No Fever, No Chills  ENT/Mouth:  No Hearing Changes,  No Difficulty Swallowing  Eyes:  No Eye Pain, No Vision Changes  Cardiovascular:  No Chest Pain, No Palpitations  Respiratory:  No Cough, No Dyspnea  Gastrointestinal:  As described in HPI  Musculoskeletal:  No Joint Swelling, No Back Pain  Skin:  No Skin Lesions, No Jaundice  Neuro:  No Syncope, No Dizziness  Heme/Lymph:  No Bruising, No Bleeding.          Physical Examination:  T(C): 36.4 (11-04-24 @ 18:02), Max: 38.1 (11-04-24 @ 02:08)  HR: 98 (11-04-24 @ 18:02) (88 - 157)  BP: 117/84 (11-04-24 @ 18:02) (75/40 - 169/74)  RR: 18 (11-04-24 @ 18:02) (18 - 19)  SpO2: 100% (11-04-24 @ 18:02) (91% - 100%)      11-03-24 @ 07:01  -  11-04-24 @ 07:00  --------------------------------------------------------  IN: 0 mL / OUT: 590 mL / NET: -590 mL          GENERAL: in distress BIPAP  HEAD:  Atraumatic, Normocephalic  EYES: conjunctiva and sclera clear  NECK: Supple, no JVD or thyromegaly  CHEST/LUNG: Clear to auscultation bilaterally; No wheeze, rhonchi, or rales  HEART: Regular rate and rhythm; normal S1, S2, No murmurs.  ABDOMEN: Soft, nontender, nondistended;  NEUROLOGY: No asterixis or tremor.   SKIN: Intact, no jaundice        Data:                        13.8   18.81 )-----------( 161      ( 04 Nov 2024 07:19 )             41.1     Hgb Trend:  13.8  11-04-24 @ 07:19  13.9  11-03-24 @ 22:37  13.9  11-03-24 @ 11:10        11-04    139  |  97[L]  |  102[HH]  ----------------------------<  243[H]  3.5   |  19  |  4.2[HH]    Ca    9.3      04 Nov 2024 07:19  Mg     1.5     11-04    TPro  5.3[L]  /  Alb  3.2[L]  /  TBili  1.6[H]  /  DBili  x   /  AST  39  /  ALT  55[H]  /  AlkPhos  149[H]  11-04    Liver panel trend:  TBili 1.6   /   AST 39   /   ALT 55   /   AlkP 149   /   Tptn 5.3   /   Alb 3.2    /   DBili --      11-04  TBili 1.7   /   AST 43   /   ALT 51   /   AlkP 141   /   Tptn 5.5   /   Alb 3.4    /   DBili --      11-03  TBili 1.2   /   AST 18   /   ALT 33   /   AlkP 124   /   Tptn 6.1   /   Alb 3.7    /   DBili --      11-03      PT/INR - ( 03 Nov 2024 11:10 )   PT: 13.10 sec;   INR: 1.11 ratio         PTT - ( 04 Nov 2024 09:21 )  PTT:149.0 sec    Urinalysis with Rflx Culture (collected 03 Nov 2024 13:51)    Culture - Blood (collected 03 Nov 2024 11:10)  Source: .Blood BLOOD  Preliminary Report (04 Nov 2024 18:01):    No growth at 24 hours    Culture - Blood (collected 03 Nov 2024 11:10)  Source: .Blood BLOOD  Preliminary Report (04 Nov 2024 18:01):    No growth at 24 hours          Radiology:

## 2024-11-04 NOTE — PATIENT PROFILE ADULT - FUNCTIONAL ASSESSMENT - BASIC MOBILITY 6.
1-calculated by average/Not able to assess (calculate score using [facility] averaging method)

## 2024-11-04 NOTE — GOALS OF CARE CONVERSATION - ADVANCED CARE PLANNING - CONVERSATION DETAILS
Spoke with patient's son at bedside regarding prognosis. Discussed advanced directives, specifically resuscitation and/or intubation if deemed necessary given patient is on BIPAP, critically ill, with advanced age and multiple co-morbidities, has a poor prognosis and likely poor outcome if intubated. Son wants heroic measures, including intubation and/or resuscitation if needed. Remains FULL Code

## 2024-11-04 NOTE — CONSULT NOTE ADULT - ATTENDING COMMENTS
74yo female presents with weakness and abdominal distension with CT imaging showing distended stomach and concern for pneumatosis. Pt on bipap, abd soft minimally distended, nontender. Continue NG decompression to liws, monitor output, serial abd exams and follow up surgery recommendations. Will consider EGD when clinically optimized to further evaluate.
This note reflects my exam and care of this patient on 11/03/2024.    This is 73-year-old female PMH A-fib, diabetes, HTN, COVID in 2020 requiring intubation with multiple complications, s/p cholecystectomy, congenital solitary kidney presents to the ED from [facility] (she lives there) for evaluation of decreased p.o. intake, abdominal discomfort, vomiting and hypotension.  Per patient's daughter and son patient has not been feeling well over the last few days, and daughter reports she was complaining of heartburn, nausea and had an episode of emesis about 5 days ago (wednesday).  States they thought patient has had a stomach bug, encouraged patient to seek medical evaluation however patient declined at that time.  Patient has been more tired, has not been getting out of bed over the last 3 days which is unusual for her. Patient is wheelchair bound at baseline. At the nursing facility, patient noted to be hypotensive with an elevated white blood cell count today and was sent to the ED for further evaluation. Patient also noted by nursing home staff to have bilateral upper extremity swelling and abdominal distention.    PE:  Awake , confused  Chest: decreased breath sounds in bilateral lung bases  CV : RRR  Abdomen: moderately distended    Patient is on Levo drip.  WBC 19.3  Creat 4.3  LA 2.2    I independently reviewed and interpreted all images and labs:  CT Abd/Pelvis is consistent with severe gastric dilatation and pneumatosis of the mucosa.    ASSESSMENT:  72 y/o female with Gastric Dilatation.  Abdominal distention.  Dehydration.  NOLA.  Lactic acidosis.  At risk for hemodynamic instability.    PLAN:  - NPO, aggressive ivf resuscitation  - NGT placed - drained 3L; continue on suction  - keep MAP>65; needs IVF  - follow serum electrolytes and UOP  - needs GI eval for possible EGD  Will follow up.
IMPRESSION:    Septic shock  UTI  Severe gastric distension w/ pneumatosis along posterior gastric wall  Intraperitoneal free air  HAGMA  NOLA, oliguric  Afib on Xarelto  HO HTN  HO ESBL Proteus    Plan as outlined above
Patient seen and examined with medical student, chart reviewed.  Noted 74 yo F NHR with h/o HTN, congenital solitary kidney, PAF on AC, admitted for weakness and poor oral intake a/w abdominal pain and distension.  Labwork noting NOLA, leukocytosis and imaging noting large distended bowel likely from a partial SBO, no surgical intervention planned for now, has NGT currently.  Likely ATN from sepsis, possible pre-renal etiology but likely severe and prolonged leading to ATN, currently she needed 3L fluid boluses and it did not improve her hemodynamics thus small dose of Levophed was initiated.  Would recommend to continue IV hydration and LR @ 75 cc/hr is appropriate, though she may have a degree of ATN as evidenced by her oliguria and thus would need to monitor for fluid overload given her need for a BiPAP currently.  No current indication for RRT at this time.  Nephrology will follow.

## 2024-11-04 NOTE — PROGRESS NOTE ADULT - SUBJECTIVE AND OBJECTIVE BOX
GENERAL SURGERY PROGRESS NOTE     Patient: PATRICIA SPRAGUE , 73y ([birth year])Female   MRN: [id number]  Location: [facility] ED Hold 032 A  Visit: 11-03-24 Inpatient  Date: 11-04-24 @ 08:21        Admitted :11-03-24 (1d)  LOS: 1d    Procedure/Dx/Injuries: Sepsis    Sepsis due to urinary tract infection         Events of past 24 hours: Patient seen and examined at bedside. NGT was placed with 3.1 L of feculent output. Patient febrile to 100.5 F at 2 am. /74 on levo 0.32 from 82/46. Saturating 100% on BIPAP 14/9 and FIO2 70%. WBC uptrending to 22.3 from 19.2.   >>> <<<>>> <<<>>> <<<>>> <<<>>> <<<>>> <<<>>> <<<>>> <<<>>> <<<>>> <<<    Vitals:   T(F): 100.5 (11-04-24 @ 02:08), Max: 100.5 (11-04-24 @ 02:08)  HR: 99 (11-04-24 @ 07:22)  BP: 169/74 (11-04-24 @ 07:22)  RR: 18 (11-04-24 @ 07:22)  SpO2: 100% (11-04-24 @ 07:22)      PHYSICAL EXAM:  General Appearance: NAD  HEENT: Normocephalic, atraumatic  Heart: s1, s2,    Lungs: Saturating 100% on BIPAP 12/8 and FIO2 of 70%.   Abdomen:  Soft, nontender, nondistended. No rebound or guarding.  MSK/Extremities: Warm & well-perfused.   Skin: Warm, dry. No jaundice.     >>> <<<>>> <<<>>> <<<>>> <<<>>> <<<>>> <<<>>> <<<>>> <<<>>> <<<>>> <<<   Is & Os:   Diet, NPO:   With Ice Chips/Sips of Water    Fluids:     11-03-24 @ 07:01  -  11-04-24 @ 07:00  --------------------------------------------------------  IN:  Total IN: 0 mL    OUT:    Indwelling Catheter - Urethral (mL): 190 mL    Nasogastric/Oral tube (mL): 400 mL  Total OUT: 590 mL    Total NET: -590 mL          Bowel Movement: :   Flatus: :   >>> <<<>>> <<<>>> <<<>>> <<<>>> <<<>>> <<<>>> <<<>>> <<<>>> <<<>>> <<<    MEDICATIONS  (STANDING):  dextrose 5% 1000 milliLiter(s) (100 mL/Hr) IV Continuous <Continuous>  heparin  Infusion.  Unit(s)/Hr (17 mL/Hr) IV Continuous <Continuous>  meropenem  IVPB      meropenem  IVPB 500 milliGRAM(s) IV Intermittent every 12 hours  norepinephrine Infusion 0.05 MICROgram(s)/kG/Min (8.48 mL/Hr) IV Continuous <Continuous>  pantoprazole  Injectable 40 milliGRAM(s) IV Push daily  vancomycin  IVPB 1000 milliGRAM(s) IV Intermittent every 24 hours    MEDICATIONS  (PRN):  heparin   Injectable 3500 Unit(s) IV Push every 6 hours PRN For aPTT between 40 - 57  heparin   Injectable 7500 Unit(s) IV Push every 6 hours PRN For aPTT less than 40      DVT PROPHYLAXIS: heparin   Injectable 7500 Unit(s) IV Push every 6 hours PRN  heparin   Injectable 3500 Unit(s) IV Push every 6 hours PRN  heparin  Infusion.  Unit(s)/Hr IV Continuous <Continuous>    GI PROPHYLAXIS: pantoprazole  Injectable 40 milliGRAM(s) IV Push daily    ANTICOAGULATION:   ANTIBIOTICS:  meropenem  IVPB    meropenem  IVPB 500 milliGRAM(s)  vancomycin  IVPB 1000 milliGRAM(s)      >>> <<<>>> <<<>>> <<<>>> <<<>>> <<<>>> <<<>>> <<<>>> <<<>>> <<<>>> <<<    Isolation Precautions:     Isolation Type: CONTACT;  Indication for Isolation: Clostridium difficile    Additional Instructions:  Contact Isolation (11-03-24 @ 13:48)      LAB/STUDIES:  Labs:  CAPILLARY BLOOD GLUCOSE      POCT Blood Glucose.: 131 mg/dL (03 Nov 2024 22:00)                          13.9   21.34 )-----------( 198      ( 03 Nov 2024 22:37 )             41.6       Auto Neutrophil %: 79.4 % (11-03-24 @ 11:10)  Auto Immature Granulocyte %: 0.4 % (11-03-24 @ 11:10)    11-03    136  |  98  |  105[HH]  ----------------------------<  137[H]  4.0   |  19  |  4.1[HH]      Calcium: 9.7 mg/dL (11-03-24 @ 22:37)      LFTs:             5.5  | 1.7  | 43       ------------------[141     ( 03 Nov 2024 22:37 )  3.4  | x    | 51          Lipase:x      Amylase:x         Blood Gas Arterial, Lactate: 1.2 mmol/L (11-04-24 @ 02:28)  Lactate, Blood: 2.9 mmol/L (11-03-24 @ 22:37)  Blood Gas Venous - Lactate: 2.3 mmol/L (11-03-24 @ 19:09)  Lactate, Blood: 2.5 mmol/L (11-03-24 @ 15:33)  Blood Gas Venous - Lactate: 2.0 mmol/L (11-03-24 @ 11:54)  Lactate, Blood: 2.2 mmol/L (11-03-24 @ 11:10)    ABG - ( 04 Nov 2024 02:28 )  pH: 7.35  /  pCO2: 30    /  pO2: 80    / HCO3: 17    / Base Excess: -7.8  /  SaO2: 94.2              Coags:     13.10  ----< 1.11    ( 03 Nov 2024 11:10 )     36.3                Urinalysis Basic - ( 03 Nov 2024 22:37 )    Color: x / Appearance: x / SG: x / pH: x  Gluc: 137 mg/dL / Ketone: x  / Bili: x / Urobili: x   Blood: x / Protein: x / Nitrite: x   Leuk Esterase: x / RBC: x / WBC x   Sq Epi: x / Non Sq Epi: x / Bacteria: x        Urinalysis with Rflx Culture (collected 03 Nov 2024 13:51)    GENERAL SURGERY PROGRESS NOTE     Patient: PATRICIA SPRAGUE , 73y ([birth year])Female   MRN: [id number]  Location: [facility] ED Hold 032 A  Visit: 11-03-24 Inpatient  Date: 11-04-24 @ 08:21        Admitted :11-03-24 (1d)  LOS: 1d    Procedure/Dx/Injuries: Sepsis    Sepsis due to urinary tract infection         Events of past 24 hours: Patient seen and examined at bedside. NGT was placed with 3.1 L of feculent output on insertion. Patient febrile to 100.5 F at 2 am. /74 on levo 0.32 from 82/46. Saturating 100% on BIPAP 14/9 and FIO2 70%. WBC uptrending to 22.3 from 19.2.   This AM febrile, Tmax 100.5. Lactate 3.2   >>> <<<>>> <<<>>> <<<>>> <<<>>> <<<>>> <<<>>> <<<>>> <<<>>> <<<>>> <<<    Vitals:   T(F): 100.5 (11-04-24 @ 02:08), Max: 100.5 (11-04-24 @ 02:08)  HR: 99 (11-04-24 @ 07:22)  BP: 169/74 (11-04-24 @ 07:22)  RR: 18 (11-04-24 @ 07:22)  SpO2: 100% (11-04-24 @ 07:22)      PHYSICAL EXAM:  General Appearance: NAD  HEENT: Normocephalic, atraumatic, NG tube in place, feculent colored output  Heart: s1, s2,    Lungs: Saturating 100% on BIPAP 12/8 and FIO2 of 70%.   Abdomen:  Soft, nontender, nondistended. No rebound or guarding.  MSK/Extremities: Warm & well-perfused.   Skin: Warm, dry. No jaundice.     >>> <<<>>> <<<>>> <<<>>> <<<>>> <<<>>> <<<>>> <<<>>> <<<>>> <<<>>> <<<   Is & Os:   Diet, NPO:   With Ice Chips/Sips of Water    Fluids:     11-03-24 @ 07:01  -  11-04-24 @ 07:00  --------------------------------------------------------  IN:  Total IN: 0 mL    OUT:    Indwelling Catheter - Urethral (mL): 190 mL    Nasogastric/Oral tube (mL): 400 mL  Total OUT: 590 mL    Total NET: -590 mL          Bowel Movement: :   Flatus: :   >>> <<<>>> <<<>>> <<<>>> <<<>>> <<<>>> <<<>>> <<<>>> <<<>>> <<<>>> <<<    MEDICATIONS  (STANDING):  dextrose 5% 1000 milliLiter(s) (100 mL/Hr) IV Continuous <Continuous>  heparin  Infusion.  Unit(s)/Hr (17 mL/Hr) IV Continuous <Continuous>  meropenem  IVPB      meropenem  IVPB 500 milliGRAM(s) IV Intermittent every 12 hours  norepinephrine Infusion 0.05 MICROgram(s)/kG/Min (8.48 mL/Hr) IV Continuous <Continuous>  pantoprazole  Injectable 40 milliGRAM(s) IV Push daily  vancomycin  IVPB 1000 milliGRAM(s) IV Intermittent every 24 hours    MEDICATIONS  (PRN):  heparin   Injectable 3500 Unit(s) IV Push every 6 hours PRN For aPTT between 40 - 57  heparin   Injectable 7500 Unit(s) IV Push every 6 hours PRN For aPTT less than 40      DVT PROPHYLAXIS: heparin   Injectable 7500 Unit(s) IV Push every 6 hours PRN  heparin   Injectable 3500 Unit(s) IV Push every 6 hours PRN  heparin  Infusion.  Unit(s)/Hr IV Continuous <Continuous>    GI PROPHYLAXIS: pantoprazole  Injectable 40 milliGRAM(s) IV Push daily    ANTICOAGULATION:   ANTIBIOTICS:  meropenem  IVPB    meropenem  IVPB 500 milliGRAM(s)  vancomycin  IVPB 1000 milliGRAM(s)      >>> <<<>>> <<<>>> <<<>>> <<<>>> <<<>>> <<<>>> <<<>>> <<<>>> <<<>>> <<<    Isolation Precautions:     Isolation Type: CONTACT;  Indication for Isolation: Clostridium difficile    Additional Instructions:  Contact Isolation (11-03-24 @ 13:48)      LAB/STUDIES:  Labs:  CAPILLARY BLOOD GLUCOSE      POCT Blood Glucose.: 131 mg/dL (03 Nov 2024 22:00)                          13.9   21.34 )-----------( 198      ( 03 Nov 2024 22:37 )             41.6       Auto Neutrophil %: 79.4 % (11-03-24 @ 11:10)  Auto Immature Granulocyte %: 0.4 % (11-03-24 @ 11:10)    11-03    136  |  98  |  105[HH]  ----------------------------<  137[H]  4.0   |  19  |  4.1[HH]      Calcium: 9.7 mg/dL (11-03-24 @ 22:37)      LFTs:             5.5  | 1.7  | 43       ------------------[141     ( 03 Nov 2024 22:37 )  3.4  | x    | 51          Lipase:x      Amylase:x         Blood Gas Arterial, Lactate: 1.2 mmol/L (11-04-24 @ 02:28)  Lactate, Blood: 2.9 mmol/L (11-03-24 @ 22:37)  Blood Gas Venous - Lactate: 2.3 mmol/L (11-03-24 @ 19:09)  Lactate, Blood: 2.5 mmol/L (11-03-24 @ 15:33)  Blood Gas Venous - Lactate: 2.0 mmol/L (11-03-24 @ 11:54)  Lactate, Blood: 2.2 mmol/L (11-03-24 @ 11:10)    ABG - ( 04 Nov 2024 02:28 )  pH: 7.35  /  pCO2: 30    /  pO2: 80    / HCO3: 17    / Base Excess: -7.8  /  SaO2: 94.2              Coags:     13.10  ----< 1.11    ( 03 Nov 2024 11:10 )     36.3                Urinalysis Basic - ( 03 Nov 2024 22:37 )    Color: x / Appearance: x / SG: x / pH: x  Gluc: 137 mg/dL / Ketone: x  / Bili: x / Urobili: x   Blood: x / Protein: x / Nitrite: x   Leuk Esterase: x / RBC: x / WBC x   Sq Epi: x / Non Sq Epi: x / Bacteria: x        Urinalysis with Rflx Culture (collected 03 Nov 2024 13:51)

## 2024-11-04 NOTE — PROGRESS NOTE ADULT - ASSESSMENT
ASSESSMENT  73-year-old female PMH A-fib on xarelto,, HTN, s/p cholecystectomy, congenital solitary kidney presents from NH to the ED for evaluation of weakness, decreased PO intake and abdominal distension. Pt's son Everton says pt has not been feeling well the past few days, and has had poor appetite which is unlike her. She's been more tired and weak, unable to get out of bed. Has not had a bowel movement in 3 days, pt unsure if she's been passing gas otherwise. She was noted to be hypotensive and have an elevated wbc count at the NH as well. No dysuria, chest pain, SOB, cough or fever per pt otherwise.  No other complaints currently.     In the ED, BP 66/45 S/P LR 2800cc bolus with no improvement in BP, started on peripheral levo. Labs with wbc 19.29, vbg lactate 2.0-->2.3, pH 7.2, pCO2 45, Na 133, AG 21, Cr 4.3 (baseline 1.1), UA contaminated. CTAP with severe gastric distension, multiple foci of intraperitoneal free air and urinary bladder thickening with free air. Surgery consulted, no intervention for now, NGT placed for gastric decompression with 3.1L feculent output,     PROBLEMS  #Septic shock  #Cystitis  #Severe gastric distension w/ pneumatosis along posterior gastric wall  #B/l pleural effusions  -UA postive  -WBC count 18k, lactate 3.2  -Fever overnight 100.5  -Previously colonized by ESBL    RECOMMENDATIONS  -Fu UCx and Bcx  -Cw meropenem, hold vanc  - Obtain Vanc trough in antimicrobials  -Vanc dosing per pharm ID ASSESSMENT  73-year-old female PMH A-fib on xarelto,, HTN, s/p cholecystectomy, congenital solitary kidney presents from NH to the ED for evaluation of weakness, decreased PO intake and abdominal distension. Pt's son Everton says pt has not been feeling well the past few days, and has had poor appetite which is unlike her. She's been more tired and weak, unable to get out of bed. Has not had a bowel movement in 3 days, pt unsure if she's been passing gas otherwise. She was noted to be hypotensive and have an elevated wbc count at the NH as well. No dysuria, chest pain, SOB, cough or fever per pt otherwise.  No other complaints currently.     In the ED, BP 66/45 S/P LR 2800cc bolus with no improvement in BP, started on peripheral levo. Labs with wbc 19.29, vbg lactate 2.0-->2.3, pH 7.2, pCO2 45, Na 133, AG 21, Cr 4.3 (baseline 1.1), UA contaminated. CTAP with severe gastric distension, multiple foci of intraperitoneal free air and urinary bladder thickening with free air. Surgery consulted, no intervention for now, NGT placed for gastric decompression with 3.1L feculent output,     PROBLEMS  #Septic shock  #Cystitis  #Severe gastric distension w/ pneumatosis along posterior gastric wall  #B/l pleural effusions  -UA postive  -WBC count 18k, lactate 3.2  -Fever overnight 100.5  -Previously colonized by ESBL    RECOMMENDATIONS  -Fu UCx and Bcx  -Cw meropenem, hold vancomycin  - fluconazole 400mg x1 then 200mg daily IV  - Obtain Vanc trough in antimicrobials  -Vanc dosing per pharm ID

## 2024-11-04 NOTE — ED ADULT NURSE REASSESSMENT NOTE - NS ED NURSE REASSESS COMMENT FT1
Received report from DAVIDSON Oquendo. Pt AO4, no acute distress present. Pt on BiPAP, satting 97%. Cardiac monitoring maintained. Comfort and safety measures maintained

## 2024-11-04 NOTE — PROGRESS NOTE ADULT - ATTENDING COMMENTS
ACS Attending Note Attestation    Patient is examined and evaluated at the bedside with the residents/PAs. Treatment plan discussed with the team, nurses, and consulting physicians and consulting teams. Medications, radiological studies and all other relevant studies reviewed. I reviewed the resident/PA note and agreed with above assessment and plan with following additions and corrections. Time devoted to teaching and to any procedures I billed separately is not included.     denies abdominal pain    Physical Exam:   I independently performed a medically appropriate exam. The exam was notable for    Abd: soft, non tender, not distended, reducible right upper quadrant hernia  NG has bilious output       Labs: I have reviewed the labs on system  WBC: 12, Hb: 8.3, Cr:0.8  normal INR    Radiology: I have reviewed and interpreted the imaging  CXR: Ng tube below diaphragm    Assessment:   73-year-old female PMH A-fib on xarelto,  s/p open cholecystectomy, congenital solitary kidney presented with lethargy and anorexia, on imaging she was found to have emphysematous changes of a small area of gastric mucosa with gastric distension.   There is no history of gastroparesis  Initial NG tube placement had 3.1 L output.   I think that the emphysematous changes could be due to gastric distension and show doesn't need any acute surgical intervention, however, she will benefit from an EGD     NG tube decompression  PPIs  EGD with GI  Remaining as per ICU team     Madai Peters MD  Trauma/ACS/Surgical Critical care Attending .

## 2024-11-04 NOTE — PROGRESS NOTE ADULT - ASSESSMENT
ASSESSMENT:  73-year-old female PMH A-fib on xarelto,  HTN, s/p cholecystectomy, congenital solitary kidney presents from NH to the ED for evaluation of weakness, decreased PO intake and abdominal distension.      PLAN:  - NGT in place to suction. Monitor output   - Patient on heparin drip, monitor PTT  - Wean patient off levo, if possible. Maintain MAP > 65  - Encourage ambulation as tolerated  - DVT and GI Prophylaxis         ASSESSMENT:  73-year-old female PMH A-fib on xarelto,  HTN, s/p cholecystectomy, congenital solitary kidney presents from NH to the ED for evaluation of weakness, decreased PO intake and abdominal distension.      PLAN:  - NGT in place to suction. Monitor output   - Patient on heparin drip, monitor PTT  - Wean patient off levo, if possible. Maintain MAP > 65  - Encourage ambulation as tolerated  - DVT and GI Prophylaxis  - GI for possible scope  - patient may benefit from PEG as could have element of chronic gastroparesis          ASSESSMENT:  73-year-old female PMH A-fib on xarelto,  HTN, s/p cholecystectomy, congenital solitary kidney presents from NH to the ED for evaluation of weakness, decreased PO intake and abdominal distension.      PLAN:  - NGT in place to suction. Monitor output   - Patient on heparin drip, monitor PTT  - low MAPs overnight/hypotensive, put on Levo. Labs indicative of severe dehydration, likely 2/2 heavy GI losses (high NG tube output), would recommend IV fluid boluses for resuscitation and weaning vasopressors as tolerated. Goal to maintain MAP > 65  - Encourage ambulation as tolerated  - DVT and GI Prophylaxis  - GI for possible scope  - patient may benefit from PEG as could have element of chronic gastroparesis, will follow         ASSESSMENT:  73-year-old female PMH A-fib on xarelto,  HTN, s/p cholecystectomy, congenital solitary kidney presents from NH to the ED for evaluation of weakness, decreased PO intake and abdominal distension. Found to have severe gastric distension with emphysematous changes, NG tube placed in ED with 3.1L initial output. Admitted to medical services for workup, actively resuscitated and decompressed via NG tube. Surgery consulted for gastric distension and air within gastric wall      PLAN:  - NGT in place to suction. Monitor output   - Patient on heparin drip, monitor PTT  - low MAPs overnight/hypotensive, put on Levo. Labs indicative of severe dehydration, likely 2/2 heavy GI losses (high NG tube output) vs unknown septic source (less likely), would recommend IV fluid boluses for resuscitation and weaning vasopressors as tolerated. Goal to maintain MAP > 65  - Encourage ambulation as tolerated  - DVT and GI Prophylaxis  - GI for possible scope  - patient may benefit from PEG as could have element of chronic gastroparesis, will follow

## 2024-11-04 NOTE — PATIENT PROFILE ADULT - FALL HARM RISK - HARM RISK INTERVENTIONS
Assistance with ambulation/Assistance OOB with selected safe patient handling equipment/Communicate Risk of Fall with Harm to all staff/Discuss with provider need for PT consult/Monitor gait and stability/Reinforce activity limits and safety measures with patient and family/Review medications for side effects contributing to fall risk/Sit up slowly, dangle for a short time, stand at bedside before walking/Tailored Fall Risk Interventions/Toileting schedule using arm’s reach rule for commode and bathroom/Visual Cue: Yellow wristband and red socks/Bed in lowest position, wheels locked, appropriate side rails in place/Call bell, personal items and telephone in reach/Instruct patient to call for assistance before getting out of bed or chair/Non-slip footwear when patient is out of bed/[place] to call system/Physically safe environment - no spills, clutter or unnecessary equipment/Purposeful Proactive Rounding/Room/bathroom lighting operational, light cord in reach

## 2024-11-04 NOTE — CONSULT NOTE ADULT - ASSESSMENT
73-year-old female PMH A-fib on xarelto,  HTN, s/p cholecystectomy, congenital solitary kidney presents from NH to the ED for evaluation of weakness, decreased PO intake and abdominal distension    #prerenal NOLA likely ATN 2/2 septic shock  #Septic shock 2/2 cystitis vs intraabdominal infection  #HAGMA 2/2 lactic acidosis and uremia  #congenital solitary kidney     - Cr 4.3 > 4.2, Cr 1.4 on 11/1 and 1.0 on 7/5/24  - strict Is&Os  - daily BMP trend Cr correct lytes as needed  - f/u Ucx and Bcx   - obtain Urine studies  - c/w levophed, give fluids LR 75   - no indication for RRT at this time  - will follow

## 2024-11-04 NOTE — PATIENT PROFILE ADULT - FALL HARM RISK - FACTORS
IV and/or equipment tethered to patient/Medication side effects/Other medical problems/Poor balance/Weakness

## 2024-11-04 NOTE — CONSULT NOTE ADULT - SUBJECTIVE AND OBJECTIVE BOX
Patient is a 73y old  Female who presents with a chief complaint of     HPI:  73-year-old female PMH A-fib on xarelto,, HTN, s/p cholecystectomy, congenital solitary kidney presents from NH to the ED for evaluation of weakness, decreased PO intake and abdominal distension. Pt's son Everton says pt has not been feeling well the past few days, and has had poor appetite which is unlike her. She's been more tired and weak, unable to get out of bed. Has not had a bowel movement in 3 days, pt unsure if she's been passing gas otherwise. She was noted to be hypotensive and have an elevated wbc count at the NH as well. No dysuria, chest pain, SOB, cough or fever per pt otherwise.  No other complaints currently.     In the ED, BP 66/45 S/P LR 2800cc bolus with no improvement in BP, started on peripheral levo. Labs with wbc 19.29, vbg lactate 2.0-->2.3, pH 7.2, pCO2 45, Na 133, AG 21, Cr 4.3 (baseline 1.1), UA contaminated. CTAP with severe gastric distension, multiple foci of intraperitoneal free air and urinary bladder thickening with free air. Surgery consulted, no intervention for now, NGT placed for gastric decompression with 3.1L feculent output, recommend GI consult.      (03 Nov 2024 21:25)      PAST MEDICAL & SURGICAL HISTORY:  HTN (hypertension)      Diabetes mellitus      Obesity      Gastroesophageal reflux disease      Active asthma      Generalized OA      Afib      H/O hernia repair  2011 and revised in 2013      History of cholecystectomy          SOCIAL HX:   Smoking       No                  ETOH                            Other    FAMILY HISTORY:  :  No known cardiovacular family hisotry     Review Of Systems:     All ROS are negative except per HPI       Allergies    No Known Allergies    Intolerances          PHYSICAL EXAM    ICU Vital Signs Last 24 Hrs  T(C): 37 (04 Nov 2024 08:48), Max: 38.1 (04 Nov 2024 02:08)  T(F): 98.6 (04 Nov 2024 08:48), Max: 100.5 (04 Nov 2024 02:08)  HR: 88 (04 Nov 2024 10:29) (84 - 157)  BP: 111/76 (04 Nov 2024 10:29) (75/40 - 169/74)  BP(mean): 58 (04 Nov 2024 05:35) (54 - 98)  ABP: --  ABP(mean): --  RR: 18 (04 Nov 2024 08:29) (18 - 20)  SpO2: 100% (04 Nov 2024 08:29) (94% - 100%)    O2 Parameters below as of 04 Nov 2024 08:29  Patient On (Oxygen Delivery Method): BiPAP/CPAP            CONSTITUTIONAL:  NAD    ENT:   NG tube    CARDIAC:   Normal rate,   Regular rhythm.    No edema    RESPIRATORY:   No wheezing  Bilateral BS   Not tachypneic,  No use of accessory muscles    GASTROINTESTINAL:  Abdomen soft,   Non-tender,   No guarding    NEUROLOGICAL:   Awake and alert    SKIN:   Skin normal color for race,            11-03-24 @ 07:01  -  11-04-24 @ 07:00  --------------------------------------------------------  IN:  Total IN: 0 mL    OUT:    Indwelling Catheter - Urethral (mL): 190 mL    Nasogastric/Oral tube (mL): 400 mL  Total OUT: 590 mL    Total NET: -590 mL          LABS:                          13.8   18.81 )-----------( 161      ( 04 Nov 2024 07:19 )             41.1                                               11-04    139  |  97[L]  |  102[HH]  ----------------------------<  243[H]  3.5   |  19  |  4.2[HH]    Ca    9.3      04 Nov 2024 07:19  Mg     1.5     11-04    TPro  5.3[L]  /  Alb  3.2[L]  /  TBili  1.6[H]  /  DBili  x   /  AST  39  /  ALT  55[H]  /  AlkPhos  149[H]  11-04      PT/INR - ( 03 Nov 2024 11:10 )   PT: 13.10 sec;   INR: 1.11 ratio         PTT - ( 03 Nov 2024 11:10 )  PTT:36.3 sec                                       Urinalysis Basic - ( 04 Nov 2024 07:19 )    Color: x / Appearance: x / SG: x / pH: x  Gluc: 243 mg/dL / Ketone: x  / Bili: x / Urobili: x   Blood: x / Protein: x / Nitrite: x   Leuk Esterase: x / RBC: x / WBC x   Sq Epi: x / Non Sq Epi: x / Bacteria: x                                                  LIVER FUNCTIONS - ( 04 Nov 2024 07:19 )  Alb: 3.2 g/dL / Pro: 5.3 g/dL / ALK PHOS: 149 U/L / ALT: 55 U/L / AST: 39 U/L / GGT: x                                                  Urinalysis with Rflx Culture (collected 03 Nov 2024 13:51)                                                                                       ABG - ( 04 Nov 2024 02:28 )  pH, Arterial: 7.35  pH, Blood: x     /  pCO2: 30    /  pO2: 80    / HCO3: 17    / Base Excess: -7.8  /  SaO2: 94.2                X-Rays reviewed                                                                                     ECHO    CXR interpreted by me     MEDICATIONS  (STANDING):  dextrose 5% 1000 milliLiter(s) (100 mL/Hr) IV Continuous <Continuous>  heparin  Infusion.  Unit(s)/Hr (17 mL/Hr) IV Continuous <Continuous>  magnesium sulfate  IVPB 2 Gram(s) IV Intermittent once  meropenem  IVPB 500 milliGRAM(s) IV Intermittent every 12 hours  meropenem  IVPB      norepinephrine Infusion 0.05 MICROgram(s)/kG/Min (8.48 mL/Hr) IV Continuous <Continuous>  pantoprazole  Injectable 40 milliGRAM(s) IV Push daily  vancomycin  IVPB 1000 milliGRAM(s) IV Intermittent every 24 hours    MEDICATIONS  (PRN):  heparin   Injectable 3500 Unit(s) IV Push every 6 hours PRN For aPTT between 40 - 57  heparin   Injectable 7500 Unit(s) IV Push every 6 hours PRN For aPTT less than 40

## 2024-11-04 NOTE — CONSULT NOTE ADULT - ASSESSMENT
IMPRESSION:    Septic shock  UTI  Severe gastric distension w/ pneumatosis along posterior gastric wall  Intraperitoneal free air  HAGMA  NOLA, oliguric  Afib on Xarelto  HO HTN  HO ESBL Proteus    PLAN:    CNS: Avoid depressants    HEENT: Oral care    PULMONARY:  HOB @ 45 degrees.  Aspiration precautions.  Wean off NIV to HFNC.    CARDIOVASCULAR: TTE.  Goal directed fluid resuscitation.  Wean off pressors.  Target MAP 65.  Trend LA    GI: GI prophylaxis.  NPO w/ NGT to suction.  Surgery following.  GI eval.     RENAL:  Follow up lytes.  Correct as needed.  Strict intake and output.  Nephro eval.    INFECTIOUS DISEASE: Follow up cultures.  Bibi, Vanco and Fluconazole.  ID eval.  MRSA nares.    HEMATOLOGICAL:  DVT prophylaxis.  Hold full dose anticoagulation.  Dimer.    ENDOCRINE:  Follow up FS.  Insulin protocol if needed.    MUSCULOSKELETAL: Bedrest.  Offloading    Full code  MICU         IMPRESSION:    Septic shock  UTI  Severe gastric distension w/ pneumatosis along posterior gastric wall  Intraperitoneal free air  HAGMA  NOLA, oliguric  Afib on Xarelto  HO HTN  HO ESBL Proteus    PLAN:    CNS: Avoid depressants    HEENT: Oral care    PULMONARY:  HOB @ 45 degrees.  Aspiration precautions.  Wean off NIV to HFNC.    CARDIOVASCULAR: TTE.  Goal directed fluid resuscitation.  Wean off pressors.  Target MAP 65.  Trend LA    GI: GI prophylaxis.  NPO w/ NGT to suction.  Surgery following.  GI eval.  Daily KUB.    RENAL:  Follow up lytes.  Correct as needed.  Strict intake and output.  Nephro eval.    INFECTIOUS DISEASE: Follow up cultures.  Bibi, Vanco and Fluconazole.  ID eval.  MRSA nares.  GI PCR.    HEMATOLOGICAL:  DVT prophylaxis.  Hold full dose anticoagulation.  Duplex.    ENDOCRINE:  Follow up FS.  Insulin protocol if needed.    MUSCULOSKELETAL: Bedrest.  Offloading    Full code  MICU         IMPRESSION:    Septic shock  UTI  Severe gastric distension w/ pneumatosis along posterior gastric wall  Intraperitoneal free air  HAGMA  NOLA, oliguric  Afib on Xarelto  HO HTN  HO ESBL Proteus    PLAN:    CNS: Avoid depressants    HEENT: Oral care    PULMONARY:  HOB @ 45 degrees.  Aspiration precautions.  transition to O2 as otolerated  .    CARDIOVASCULAR: TTE.  Goal directed fluid resuscitation.  Wean Levophed.  Target MAP 65.  Trend LA    GI: GI prophylaxis.  NPO w/ NGT to suction.  Surgery following.  GI eval.  Daily KUB.    RENAL:  Follow up lytes.  Correct as needed.  Strict intake and output.  Nephro eval.  Burger for is and Os     INFECTIOUS DISEASE: Follow up cultures.  Bibi, Vanco and Fluconazole.  ID eval.  MRSA nares.  GI PCR.    HEMATOLOGICAL:  DVT prophylaxis.  Hold full dose anticoagulation.      ENDOCRINE:  Follow up FS.  Insulin protocol if needed.    MUSCULOSKELETAL: Bedrest.  Offloading    Full code  MICU         IMPRESSION:    Septic shock  UTI  Severe gastric distension w/ pneumatosis along posterior gastric wall  Possible aspiration   Intraperitoneal free air  HAGMA  NOLA, oliguric  Afib on Xarelto  HO HTN  HO ESBL Proteus    PLAN:    CNS: Avoid depressants    HEENT: Oral care    PULMONARY:  HOB @ 45 degrees.  Aspiration precautions.  transition to O2 as otolerated  .    CARDIOVASCULAR: TTE.  Goal directed fluid resuscitation.  Wean Levophed.  Target MAP 65.  Trend LA    GI: GI prophylaxis.  NPO w/ NGT to suction.  Surgery following.  GI eval.  Daily KUB.    RENAL:  Follow up lytes.  Correct as needed.  Strict intake and output.  Nephro eval.  Burger for is and Os     INFECTIOUS DISEASE: Follow up cultures.  Bibi, Vanco and Fluconazole.  ID eval.  MRSA nares.  GI PCR.    HEMATOLOGICAL:  DVT prophylaxis.  Hold full dose anticoagulation.      ENDOCRINE:  Follow up FS.  Insulin protocol if needed.    MUSCULOSKELETAL: Bedrest.  Offloading    Full code  MICU

## 2024-11-04 NOTE — CONSULT NOTE ADULT - ASSESSMENT
73-year-old female PMH A-fib on xarelto,, HTN, s/p cholecystectomy, congenital solitary kidney presents from NH to the ED for evaluation of weakness, decreased PO intake and abdominal distension. Has not had a bowel movement in 3 days, pt unsure if she's been passing gas otherwise. She was noted to be hypotensive and have an elevated wbc count at the NH as well.  In the ED, BP 66/45 S/P LR started on  levo. Labs with wbc 19.29 CTAP with severe gastric distension, multiple foci of intraperitoneal free air and urinary bladder thickening with free air. Surgery consulted, no intervention for now, NGT placed for gastric decompression. GI was consulted for gastric distention and concern of ischemia on imaging.     Septic shock   Gastric distention s/p decompression   Pneumatosis and concern of gastric wall ischemia   free intraperitoneal air r/o perforation  S/P PEG 2/2023 post intubation, removed in NH.   s/p cholecystectomy, mildly dilated duct.   lactic acidosis   Chronic elevation of alk phos    PLAN   Keep NPO   NGT to intermittent suction   Correct electrolytes   Can use short acting AC   RUQ US   Abx per ID   Will need EGD to r/o GOO when medically optimized.   Recommend surgery follow up and vascular eval     Discussed with son at bedside.   Supportive care per primary team.   Will follow  73-year-old female PMH A-fib on xarelto,, HTN, s/p cholecystectomy, congenital solitary kidney presents from NH to the ED for evaluation of weakness, decreased PO intake and abdominal distension. Has not had a bowel movement in 3 days, pt unsure if she's been passing gas otherwise. She was noted to be hypotensive and have an elevated wbc count at the NH as well.  In the ED, BP 66/45 S/P LR started on  levo. Labs with wbc 19.29 CTAP with severe gastric distension, multiple foci of intraperitoneal free air and urinary bladder thickening with free air. Surgery consulted, no intervention for now, NGT placed for gastric decompression. GI was consulted for gastric distention and concern of ischemia on imaging.     Septic shock   Gastric distention s/p decompression   Pneumatosis and concern of gastric wall ischemia   free intraperitoneal air r/o perforation  S/P PEG 2/2023 post intubation, removed in NH.   s/p cholecystectomy, mildly dilated duct.   lactic acidosis   Chronic elevation of alk phos    PLAN   Keep NPO   NGT to intermittent suction   Correct electrolytes   Can use short acting AC as deemed indicated per team  Continue PPI  RUQ US   Abx per ID   Will need EGD to evaluate stomach and r/o GOO when medically optimized.   Recommend surgery follow up and vascular eval     Discussed with son at bedside.   Supportive care per primary team.   Will follow

## 2024-11-04 NOTE — PROVIDER CONTACT NOTE (OTHER) - ASSESSMENT
left AC 18 gauge IV site seen bruised. Pt only has L AC 18, R AC 18, and L thumb 20 IV sites. Levo cannot be run through other peripheral IV sites.

## 2024-11-04 NOTE — PROGRESS NOTE ADULT - ATTENDING COMMENTS
I have personally seen and examined this patient.  I have fully participated in the care of this patient.  I have reviewed all pertinent clinical information, including history, physical exam, plan and note.  I have reviewed all pertinent clinical information and reviewed all relevant imaging and diagnostic studies personally.  My addendum includes the final recommendations and supersedes the resident's note.       #Septic shock requiring pressors   Rule out acute cystitis/pyelo as UA pyuria > 900  Rule out mesenteric ischemia    Admission WBC 19--> 21  < from: CT Abdomen and Pelvis No Cont (11.03.24 @ 11:37) >  1. Severe gastric distention extending to the level of the first/second   portion of duodenum, with new pneumatosis along the posterior gastric   wall, suspicious for ischemia; multiple adjacent foci of free   intraperitoneal air are noted.  2. New small linear foci of air within the peripheral liver, suspicious for portal venous gas.  3. Thick-walled urinary bladder containing multiple foci of air; findings may be related to recent intervention and/or underlying cystitis, however   however please note colovesicular fistula can have this appearance. Clinical correlation is suggested.  4. Small bilateral pleural effusions with small bibasilar opacities/atelectasis.    1/2023 UCX ESBL proteus     Cdiff (-)   #Lactic acidosis  #NOLA     - Meropenem as above  - Hold further vancomycin, check AM level  - See no need for fluconazole  - f/u BCX, UCX  - Send GI PCR   - GI   - Poor prognosis, GOC    If any questions, please text or call on [employer] Teams  Please continue to update ID with any pertinent new clinical, laboratory or radiographic findings I have personally seen and examined this patient.  I have fully participated in the care of this patient.  I have reviewed all pertinent clinical information, including history, physical exam, plan and note.  I have reviewed all pertinent clinical information and reviewed all relevant imaging and diagnostic studies personally.  My addendum includes the final recommendations and supersedes the resident's note.       #Septic shock requiring pressors   Rule out acute cystitis/pyelo as UA pyuria > 900  Rule out mesenteric ischemia    Admission WBC 19--> 21  < from: CT Abdomen and Pelvis No Cont (11.03.24 @ 11:37) >  1. Severe gastric distention extending to the level of the first/second   portion of duodenum, with new pneumatosis along the posterior gastric   wall, suspicious for ischemia; multiple adjacent foci of free   intraperitoneal air are noted.  2. New small linear foci of air within the peripheral liver, suspicious for portal venous gas.  3. Thick-walled urinary bladder containing multiple foci of air; findings may be related to recent intervention and/or underlying cystitis, however   however please note colovesicular fistula can have this appearance. Clinical correlation is suggested.  4. Small bilateral pleural effusions with small bibasilar opacities/atelectasis.    1/2023 UCX ESBL proteus     Cdiff (-)   #Lactic acidosis  #NOLA     - Meropenem as above  - Hold further vancomycin, check AM level  - fluconazole 400mg x1 then 200mg daily IV, monitor LFTs. Obtain EKG to check QTC   - f/u BCX, UCX  - Send GI PCR   - GI   - Poor prognosis, GOC    If any questions, please text or call on [employer] Teams  Please continue to update ID with any pertinent new clinical, laboratory or radiographic findings

## 2024-11-04 NOTE — CONSULT NOTE ADULT - SUBJECTIVE AND OBJECTIVE BOX
NEPHROLOGY CONSULTATION NOTE    73-year-old female PMH A-fib on xarelto,, HTN, s/p cholecystectomy, congenital solitary kidney presents from NH to the ED for evaluation of weakness, decreased PO intake and abdominal distension. Pt's son Everton says pt has not been feeling well the past few days, and has had poor appetite which is unlike her. She's been more tired and weak, unable to get out of bed. Has not had a bowel movement in 3 days, pt unsure if she's been passing gas otherwise. She was noted to be hypotensive and have an elevated wbc count at the NH as well. No dysuria, chest pain, SOB, cough or fever per pt otherwise.  No other complaints currently.     In the ED, BP 66/45 S/P LR 2800cc bolus with no improvement in BP, started on peripheral levo. Labs with wbc 19.29, vbg lactate 2.0-->2.3, pH 7.2, pCO2 45, Na 133, AG 21, Cr 4.3 (baseline 1.1), UA contaminated. CTAP with severe gastric distension, multiple foci of intraperitoneal free air and urinary bladder thickening with free air. Surgery consulted, no intervention for now, NGT placed for gastric decompression with 3.1L feculent output, recommend GI consult.      Nephro consulted for NOLA. Pt has congenital solitary kidney. Pt denies hx of CKD and does not follow with outpatient nephrology.    PAST MEDICAL & SURGICAL HISTORY:  HTN (hypertension)      Diabetes mellitus      Obesity      Gastroesophageal reflux disease      Active asthma      Generalized OA      Afib      H/O hernia repair  2011 and revised in 2013      History of cholecystectomy        Allergies:  No Known Allergies    Home Medications Reviewed  Hospital Medications:   MEDICATIONS  (STANDING):  dextrose 5% 1000 milliLiter(s) (100 mL/Hr) IV Continuous <Continuous>  fluconAZOLE IVPB 400 milliGRAM(s) IV Intermittent once  fluconAZOLE IVPB      magnesium sulfate  IVPB 2 Gram(s) IV Intermittent once  meropenem  IVPB      meropenem  IVPB 500 milliGRAM(s) IV Intermittent every 12 hours  norepinephrine Infusion 0.05 MICROgram(s)/kG/Min (8.48 mL/Hr) IV Continuous <Continuous>  pantoprazole  Injectable 40 milliGRAM(s) IV Push daily  vancomycin  IVPB 1000 milliGRAM(s) IV Intermittent every 24 hours    SOCIAL HISTORY:  Denies ETOH,Smoking,   FAMILY HISTORY:      REVIEW OF SYSTEMS: Pt on BiPAP limiting ROS  CONSTITUTIONAL: No weakness, fevers or chills  EYES/ENT: No visual changes;  No vertigo or throat pain   NECK: No pain or stiffness  RESPIRATORY: No cough, wheezing, hemoptysis; No shortness of breath  CARDIOVASCULAR: No chest pain or palpitations.  GASTROINTESTINAL: abd pain, constipation and distension  GENITOURINARY: No dysuria, frequency, foamy urine, urinary urgency, incontinence or hematuria  NEUROLOGICAL: No numbness or weakness  SKIN: No itching, burning, rashes, or lesions   VASCULAR: No bilateral lower extremity edema.   All other review of systems is negative unless indicated in HPI above.    VITALS:  Vital Signs Last 24 Hrs  T(C): 36.7 (04 Nov 2024 12:05), Max: 38.1 (04 Nov 2024 02:08)  T(F): 98.1 (04 Nov 2024 12:05), Max: 100.5 (04 Nov 2024 02:08)  HR: 92 (04 Nov 2024 12:05) (88 - 157)  BP: 119/58 (04 Nov 2024 12:05) (75/40 - 169/74)  BP(mean): 58 (04 Nov 2024 05:35) (54 - 98)  RR: 18 (04 Nov 2024 12:05) (18 - 19)  SpO2: 93% (04 Nov 2024 12:05) (93% - 100%)    Parameters below as of 04 Nov 2024 12:05  Patient On (Oxygen Delivery Method): nasal cannula, high flow        11-03 @ 07:01  -  11-04 @ 07:00  --------------------------------------------------------  IN: 0 mL / OUT: 590 mL / NET: -590 mL        PHYSICAL EXAM:  Constitutional: NAD  HEENT: anicteric sclera, oropharynx clear, MMM  Neck: No JVD  Respiratory: CTAB, no wheezes, rales or rhonchi  Cardiovascular: S1, S2, RRR  Gastrointestinal: distended, +BS, tender  Extremities: No cyanosis or clubbing. No peripheral edema  Neurological: A/O x 3, no focal deficits  Psychiatric: Normal mood, normal affect  : No CVA tenderness. ya in place.   Skin: No rashes  Vascular Access: femoral CVC    LABS:  11-04    139  |  97[L]  |  102[HH]  ----------------------------<  243[H]  3.5   |  19  |  4.2[HH]    Ca    9.3      04 Nov 2024 07:19  Mg     1.5     11-04    TPro  5.3[L]  /  Alb  3.2[L]  /  TBili  1.6[H]  /  DBili      /  AST  39  /  ALT  55[H]  /  AlkPhos  149[H]  11-04    Creatinine Trend: 4.2 <--, 4.1 <--, 4.3 <--                        13.8   18.81 )-----------( 161      ( 04 Nov 2024 07:19 )             41.1     Urine Studies:  Urinalysis Basic - ( 04 Nov 2024 07:19 )    Color:  / Appearance:  / SG:  / pH:   Gluc: 243 mg/dL / Ketone:   / Bili:  / Urobili:    Blood:  / Protein:  / Nitrite:    Leuk Esterase:  / RBC:  / WBC    Sq Epi:  / Non Sq Epi:  / Bacteria:                 RADIOLOGY & ADDITIONAL STUDIES:    < from: Xray Chest 1 View- PORTABLE-Routine (Xray Chest 1 View- PORTABLE-Routine in AM.) (11.04.24 @ 06:22) >  IMPRESSION:    1. Unchanged bilateral opacities/pleural effusions.    --- End of Report ---    JET WALDEN MD; Attending Radiologist  This document has been electronically signed. Nov 4 2024  2:22PM    < end of copied text >        < from: CT Abdomen and Pelvis No Cont (11.03.24 @ 11:37) >  IMPRESSION:    1. Severe gastric distention extending to the level of the first/second   portion of duodenum, with new pneumatosis along the posterior gastric   wall, suspicious for ischemia; multiple adjacent foci of free   intraperitoneal air are noted.  2. New small linear foci of air within the peripheral liver, suspicious   for portal venous gas.  3. Thick-walled urinary bladder containing multiple foci of air; findings   may be related to recent intervention and/or underlying cystitis, however   however please note colovesicular fistula can have this appearance.   Clinical correlation is suggested.  4. Small bilateral pleural effusions with small bibasilar   opacities/atelectasis.      Spoke with DAVEY ALLEN DO; Resident Arianna on 11/3/2024 12:44 PM with   readback.    --- End of Report ---    < end of copied text >               NEPHROLOGY CONSULTATION NOTE    73-year-old female PMH A-fib on xarelto,, HTN, s/p cholecystectomy, congenital solitary kidney presents from NH to the ED for evaluation of weakness, decreased PO intake and abdominal distension. Pt's son Everton says pt has not been feeling well the past few days, and has had poor appetite which is unlike her. She's been more tired and weak, unable to get out of bed. Has not had a bowel movement in 3 days, pt unsure if she's been passing gas otherwise. She was noted to be hypotensive and have an elevated wbc count at the NH as well. No dysuria, chest pain, SOB, cough or fever per pt otherwise.  No other complaints currently.     In the ED, BP 66/45 S/P LR 2800cc bolus with no improvement in BP, started on peripheral levo. Labs with wbc 19.29, vbg lactate 2.0-->2.3, pH 7.2, pCO2 45, Na 133, AG 21, Cr 4.3 (baseline 1.1), UA contaminated. CTAP with severe gastric distension, multiple foci of intraperitoneal free air and urinary bladder thickening with free air. Surgery consulted, no intervention for now, NGT placed for gastric decompression with 3.1L feculent output, recommend GI consult.      Nephro consulted for NOLA. Pt has congenital solitary kidney. Pt denies hx of CKD and does not follow with outpatient nephrology.    PAST MEDICAL & SURGICAL HISTORY:  HTN (hypertension)      Diabetes mellitus      Obesity      Gastroesophageal reflux disease      Active asthma      Generalized OA      Afib      H/O hernia repair  2011 and revised in 2013      History of cholecystectomy        Allergies:  No Known Allergies    Home Medications Reviewed  Hospital Medications:   MEDICATIONS  (STANDING):  dextrose 5% 1000 milliLiter(s) (100 mL/Hr) IV Continuous <Continuous>  fluconAZOLE IVPB 400 milliGRAM(s) IV Intermittent once  fluconAZOLE IVPB      magnesium sulfate  IVPB 2 Gram(s) IV Intermittent once  meropenem  IVPB      meropenem  IVPB 500 milliGRAM(s) IV Intermittent every 12 hours  norepinephrine Infusion 0.05 MICROgram(s)/kG/Min (8.48 mL/Hr) IV Continuous <Continuous>  pantoprazole  Injectable 40 milliGRAM(s) IV Push daily  vancomycin  IVPB 1000 milliGRAM(s) IV Intermittent every 24 hours    SOCIAL HISTORY:  Denies ETOH,Smoking,   FAMILY HISTORY:      REVIEW OF SYSTEMS: Pt on BiPAP limiting ROS  CONSTITUTIONAL: No weakness, fevers or chills  EYES/ENT: No visual changes;  No vertigo or throat pain   NECK: No pain or stiffness  RESPIRATORY: No cough, wheezing, hemoptysis; No shortness of breath  CARDIOVASCULAR: No chest pain or palpitations.  GASTROINTESTINAL: abd pain, constipation and distension  GENITOURINARY: No dysuria, frequency, foamy urine, urinary urgency, incontinence or hematuria  NEUROLOGICAL: No numbness or weakness  SKIN: No itching, burning, rashes, or lesions   VASCULAR: No bilateral lower extremity edema.   All other review of systems is negative unless indicated in HPI above.    VITALS:  Vital Signs Last 24 Hrs  T(C): 36.7 (04 Nov 2024 12:05), Max: 38.1 (04 Nov 2024 02:08)  T(F): 98.1 (04 Nov 2024 12:05), Max: 100.5 (04 Nov 2024 02:08)  HR: 92 (04 Nov 2024 12:05) (88 - 157)  BP: 119/58 (04 Nov 2024 12:05) (75/40 - 169/74)  BP(mean): 58 (04 Nov 2024 05:35) (54 - 98)  RR: 18 (04 Nov 2024 12:05) (18 - 19)  SpO2: 93% (04 Nov 2024 12:05) (93% - 100%)    Parameters below as of 04 Nov 2024 12:05  Patient On (Oxygen Delivery Method): nasal cannula, high flow        11-03 @ 07:01  -  11-04 @ 07:00  --------------------------------------------------------  IN: 0 mL / OUT: 590 mL / NET: -590 mL        PHYSICAL EXAM:  Constitutional: NAD  HEENT: anicteric sclera, oropharynx clear, MMM  Neck: No JVD  Respiratory: CTAB, no wheezes, rales or rhonchi  Cardiovascular: S1, S2, RRR  Gastrointestinal: distended, +BS, tender  Extremities: No cyanosis or clubbing. No peripheral edema  Neurological: A/O x 3, no focal deficits  Psychiatric: Normal mood, normal affect  : No CVA tenderness. ya in place.   Skin: No rashes  Vascular Access: femoral CVC    LABS:  11-04    139  |  97[L]  |  102[HH]  ----------------------------<  243[H]  3.5   |  19  |  4.2[HH]    Ca    9.3      04 Nov 2024 07:19  Mg     1.5     11-04    TPro  5.3[L]  /  Alb  3.2[L]  /  TBili  1.6[H]  /  DBili      /  AST  39  /  ALT  55[H]  /  AlkPhos  149[H]  11-04    Creatinine Trend: 4.2 <--, 4.1 <--, 4.3 <--                        13.8   18.81 )-----------( 161      ( 04 Nov 2024 07:19 )             41.1     Urine Studies:  Urinalysis Basic - ( 04 Nov 2024 07:19 )    Color:  / Appearance:  / SG:  / pH:   Gluc: 243 mg/dL / Ketone:   / Bili:  / Urobili:    Blood:  / Protein:  / Nitrite:    Leuk Esterase:  / RBC:  / WBC    Sq Epi:  / Non Sq Epi:  / Bacteria:                 RADIOLOGY & ADDITIONAL STUDIES:    < from: Xray Chest 1 View- PORTABLE-Routine (Xray Chest 1 View- PORTABLE-Routine in AM.) (11.04.24 @ 06:22) >  IMPRESSION:    1. Unchanged bilateral opacities/pleural effusions.    --- End of Report ---    JET WALDEN MD; Attending Radiologist  This document has been electronically signed. Nov 4 2024  2:22PM    < end of copied text >        < from: CT Abdomen and Pelvis No Cont (11.03.24 @ 11:37) >  IMPRESSION:    1. Severe gastric distention extending to the level of the first/second   portion of duodenum, with new pneumatosis along the posterior gastric   wall, suspicious for ischemia; multiple adjacent foci of free   intraperitoneal air are noted.  2. New small linear foci of air within the peripheral liver, suspicious   for portal venous gas.  3. Thick-walled urinary bladder containing multiple foci of air; findings   may be related to recent intervention and/or underlying cystitis, however   however please note colovesicular fistula can have this appearance.   Clinical correlation is suggested.  4. Small bilateral pleural effusions with small bibasilar   opacities/atelectasis.      Spoke with DAEVY ALLEN DO; Resident Arianna on 11/3/2024 12:44 PM with   readback.    --- End of Report ---    < end of copied text >

## 2024-11-05 ENCOUNTER — RESULT REVIEW (OUTPATIENT)
Age: 74
End: 2024-11-05

## 2024-11-05 LAB
ALBUMIN SERPL ELPH-MCNC: 2.9 G/DL — LOW (ref 3.5–5.2)
ALP SERPL-CCNC: 125 U/L — HIGH (ref 30–115)
ALT FLD-CCNC: 33 U/L — SIGNIFICANT CHANGE UP (ref 0–41)
ANION GAP SERPL CALC-SCNC: 16 MMOL/L — HIGH (ref 7–14)
AST SERPL-CCNC: 14 U/L — SIGNIFICANT CHANGE UP (ref 0–41)
BASOPHILS # BLD AUTO: 0.05 K/UL — SIGNIFICANT CHANGE UP (ref 0–0.2)
BASOPHILS NFR BLD AUTO: 0.3 % — SIGNIFICANT CHANGE UP (ref 0–1)
BILIRUB SERPL-MCNC: 1.3 MG/DL — HIGH (ref 0.2–1.2)
BUN SERPL-MCNC: 98 MG/DL — CRITICAL HIGH (ref 10–20)
CALCIUM SERPL-MCNC: 10.4 MG/DL — SIGNIFICANT CHANGE UP (ref 8.4–10.5)
CHLORIDE SERPL-SCNC: 107 MMOL/L — SIGNIFICANT CHANGE UP (ref 98–110)
CO2 SERPL-SCNC: 21 MMOL/L — SIGNIFICANT CHANGE UP (ref 17–32)
CREAT ?TM UR-MCNC: 62 MG/DL — SIGNIFICANT CHANGE UP
CREAT SERPL-MCNC: 3.1 MG/DL — HIGH (ref 0.7–1.5)
CULTURE RESULTS: SIGNIFICANT CHANGE UP
EGFR: 15 ML/MIN/1.73M2 — LOW
EOSINOPHIL # BLD AUTO: 0.03 K/UL — SIGNIFICANT CHANGE UP (ref 0–0.7)
EOSINOPHIL NFR BLD AUTO: 0.2 % — SIGNIFICANT CHANGE UP (ref 0–8)
GAS PNL BLDA: SIGNIFICANT CHANGE UP
GLUCOSE SERPL-MCNC: 91 MG/DL — SIGNIFICANT CHANGE UP (ref 70–99)
HCT VFR BLD CALC: 30.1 % — LOW (ref 37–47)
HGB BLD-MCNC: 10.2 G/DL — LOW (ref 12–16)
IMM GRANULOCYTES NFR BLD AUTO: 0.8 % — HIGH (ref 0.1–0.3)
LYMPHOCYTES # BLD AUTO: 0.91 K/UL — LOW (ref 1.2–3.4)
LYMPHOCYTES # BLD AUTO: 4.7 % — LOW (ref 20.5–51.1)
MAGNESIUM SERPL-MCNC: 1.9 MG/DL — SIGNIFICANT CHANGE UP (ref 1.8–2.4)
MCHC RBC-ENTMCNC: 29.9 PG — SIGNIFICANT CHANGE UP (ref 27–31)
MCHC RBC-ENTMCNC: 33.9 G/DL — SIGNIFICANT CHANGE UP (ref 32–37)
MCV RBC AUTO: 88.3 FL — SIGNIFICANT CHANGE UP (ref 81–99)
MONOCYTES # BLD AUTO: 1.34 K/UL — HIGH (ref 0.1–0.6)
MONOCYTES NFR BLD AUTO: 6.9 % — SIGNIFICANT CHANGE UP (ref 1.7–9.3)
NEUTROPHILS # BLD AUTO: 16.95 K/UL — HIGH (ref 1.4–6.5)
NEUTROPHILS NFR BLD AUTO: 87.1 % — HIGH (ref 42.2–75.2)
NRBC # BLD: 0 /100 WBCS — SIGNIFICANT CHANGE UP (ref 0–0)
PLATELET # BLD AUTO: 189 K/UL — SIGNIFICANT CHANGE UP (ref 130–400)
PMV BLD: 12.3 FL — HIGH (ref 7.4–10.4)
POTASSIUM SERPL-MCNC: 3.4 MMOL/L — LOW (ref 3.5–5)
POTASSIUM SERPL-SCNC: 3.4 MMOL/L — LOW (ref 3.5–5)
PROT ?TM UR-MCNC: 23 MG/DLG/24H — SIGNIFICANT CHANGE UP
PROT SERPL-MCNC: 4.7 G/DL — LOW (ref 6–8)
PROT/CREAT UR-RTO: 0.4 RATIO — HIGH (ref 0–0.2)
RBC # BLD: 3.41 M/UL — LOW (ref 4.2–5.4)
RBC # FLD: 13.3 % — SIGNIFICANT CHANGE UP (ref 11.5–14.5)
SODIUM SERPL-SCNC: 144 MMOL/L — SIGNIFICANT CHANGE UP (ref 135–146)
SPECIMEN SOURCE: SIGNIFICANT CHANGE UP
UUN UR-MCNC: 636 MG/DL — SIGNIFICANT CHANGE UP
VANCOMYCIN FLD-MCNC: 7.4 UG/ML — SIGNIFICANT CHANGE UP (ref 5–10)
WBC # BLD: 19.43 K/UL — HIGH (ref 4.8–10.8)
WBC # FLD AUTO: 19.43 K/UL — HIGH (ref 4.8–10.8)

## 2024-11-05 PROCEDURE — 74018 RADEX ABDOMEN 1 VIEW: CPT | Mod: 26

## 2024-11-05 PROCEDURE — 99232 SBSQ HOSP IP/OBS MODERATE 35: CPT

## 2024-11-05 PROCEDURE — 93306 TTE W/DOPPLER COMPLETE: CPT | Mod: 26

## 2024-11-05 PROCEDURE — 76705 ECHO EXAM OF ABDOMEN: CPT | Mod: 26

## 2024-11-05 PROCEDURE — 99291 CRITICAL CARE FIRST HOUR: CPT

## 2024-11-05 PROCEDURE — 71045 X-RAY EXAM CHEST 1 VIEW: CPT | Mod: 26,77

## 2024-11-05 PROCEDURE — 71045 X-RAY EXAM CHEST 1 VIEW: CPT | Mod: 26,76

## 2024-11-05 PROCEDURE — 99233 SBSQ HOSP IP/OBS HIGH 50: CPT

## 2024-11-05 PROCEDURE — 93970 EXTREMITY STUDY: CPT | Mod: 26

## 2024-11-05 RX ORDER — POTASSIUM CHLORIDE 600 MG/1
10 TABLET, EXTENDED RELEASE ORAL ONCE
Refills: 0 | Status: DISCONTINUED | OUTPATIENT
Start: 2024-11-05 | End: 2024-11-05

## 2024-11-05 RX ORDER — POTASSIUM CHLORIDE 600 MG/1
40 TABLET, EXTENDED RELEASE ORAL ONCE
Refills: 0 | Status: DISCONTINUED | OUTPATIENT
Start: 2024-11-05 | End: 2024-11-05

## 2024-11-05 RX ORDER — 0.9 % SODIUM CHLORIDE 0.9 %
1000 INTRAVENOUS SOLUTION INTRAVENOUS
Refills: 0 | Status: DISCONTINUED | OUTPATIENT
Start: 2024-11-05 | End: 2024-11-05

## 2024-11-05 RX ORDER — 0.9 % SODIUM CHLORIDE 0.9 %
500 INTRAVENOUS SOLUTION INTRAVENOUS ONCE
Refills: 0 | Status: COMPLETED | OUTPATIENT
Start: 2024-11-05 | End: 2024-11-05

## 2024-11-05 RX ORDER — 0.9 % SODIUM CHLORIDE 0.9 %
500 INTRAVENOUS SOLUTION INTRAVENOUS ONCE
Refills: 0 | Status: DISCONTINUED | OUTPATIENT
Start: 2024-11-05 | End: 2024-11-05

## 2024-11-05 RX ORDER — ACETAMINOPHEN 500MG 500 MG/1
1000 TABLET, COATED ORAL ONCE
Refills: 0 | Status: COMPLETED | OUTPATIENT
Start: 2024-11-05 | End: 2024-11-05

## 2024-11-05 RX ORDER — CHLORHEXIDINE GLUCONATE 1.2 MG/ML
1 RINSE ORAL
Refills: 0 | Status: DISCONTINUED | OUTPATIENT
Start: 2024-11-05 | End: 2024-12-09

## 2024-11-05 RX ORDER — POTASSIUM CHLORIDE 600 MG/1
20 TABLET, EXTENDED RELEASE ORAL ONCE
Refills: 0 | Status: COMPLETED | OUTPATIENT
Start: 2024-11-05 | End: 2024-11-05

## 2024-11-05 RX ORDER — 0.9 % SODIUM CHLORIDE 0.9 %
1000 INTRAVENOUS SOLUTION INTRAVENOUS
Refills: 0 | Status: DISCONTINUED | OUTPATIENT
Start: 2024-11-05 | End: 2024-11-07

## 2024-11-05 RX ADMIN — POTASSIUM CHLORIDE 50 MILLIEQUIVALENT(S): 600 TABLET, EXTENDED RELEASE ORAL at 08:25

## 2024-11-05 RX ADMIN — MEROPENEM 100 MILLIGRAM(S): 500 INJECTION, POWDER, FOR SOLUTION INTRAVENOUS at 06:35

## 2024-11-05 RX ADMIN — Medication 500 MILLILITER(S): at 10:23

## 2024-11-05 RX ADMIN — Medication 75 MILLILITER(S): at 08:25

## 2024-11-05 RX ADMIN — ACETAMINOPHEN 500MG 1000 MILLIGRAM(S): 500 TABLET, COATED ORAL at 04:00

## 2024-11-05 RX ADMIN — PANTOPRAZOLE SODIUM 40 MILLIGRAM(S): 40 TABLET, DELAYED RELEASE ORAL at 12:00

## 2024-11-05 RX ADMIN — ACETAMINOPHEN 500MG 400 MILLIGRAM(S): 500 TABLET, COATED ORAL at 02:26

## 2024-11-05 RX ADMIN — FLUCONAZOLE 100 MILLIGRAM(S): 200 TABLET ORAL at 02:26

## 2024-11-05 RX ADMIN — CHLORHEXIDINE GLUCONATE 1 APPLICATION(S): 1.2 RINSE ORAL at 06:35

## 2024-11-05 RX ADMIN — MEROPENEM 100 MILLIGRAM(S): 500 INJECTION, POWDER, FOR SOLUTION INTRAVENOUS at 17:32

## 2024-11-05 NOTE — PROGRESS NOTE ADULT - ASSESSMENT
73-year-old female PMH A-fib on xarelto,, HTN, s/p cholecystectomy, congenital solitary kidney presents from NH to the ED for evaluation of weakness, decreased PO intake and abdominal distension. Has not had a bowel movement in 3 days, pt unsure if she's been passing gas otherwise. She was noted to be hypotensive and have an elevated wbc count at the NH as well.  In the ED, BP 66/45 S/P LR started on  levo. Labs with wbc 19.29 CTAP with severe gastric distension, multiple foci of intraperitoneal free air and urinary bladder thickening with free air. Surgery consulted, no intervention for now, NGT placed for gastric decompression. GI was consulted for gastric distention and concern of ischemia on imaging.     Septic shock   Gastric distention s/p decompression   Pneumatosis and concern of gastric wall ischemia   free intraperitoneal air r/o perforation  S/P PEG 2/2023 post intubation, removed in NH.   s/p cholecystectomy, mildly dilated duct.   lactic acidosis   Chronic elevation of alk phos    PLAN   Keep NPO   NGT to intermittent suction   Correct electrolytes   Can use short acting AC   RUQ US   Abx per ID   Will need EGD to r/o GOO when medically optimized.   Recommend surgery follow up and vascular eval     Discussed with son at bedside.   Supportive care per primary team.   Will follow    73-year-old female PMH A-fib on xarelto,, HTN, s/p cholecystectomy, congenital solitary kidney presents from NH to the ED for evaluation of weakness, decreased PO intake and abdominal distension. Has not had a bowel movement in 3 days, pt unsure if she's been passing gas otherwise. She was noted to be hypotensive and have an elevated wbc count at the NH as well.  In the ED, BP 66/45 S/P LR started on  levo. Labs with wbc 19.29 CTAP with severe gastric distension, multiple foci of intraperitoneal free air and urinary bladder thickening with free air. Surgery consulted, no intervention for now, NGT placed for gastric decompression. GI was consulted for gastric distention and concern of ischemia on imaging.     Septic shock   Gastric distention s/p decompression   Pneumatosis and concern of gastric wall ischemia   free intraperitoneal air r/o perforation  S/P PEG 2/2023 post intubation, removed in NH.   s/p cholecystectomy, mildly dilated duct.   lactic acidosis   Chronic elevation of alk phos    PLAN   Keep NPO   NGT to intermittent suction   Correct electrolytes   Can use short acting AC as deemed indicated per team  RUQ US   Abx per ID   Will need EGD to r/o GOO when medically optimized.   Recommend surgery follow up and vascular eval     Discussed with son at bedside.   Supportive care per primary team.   Will follow

## 2024-11-05 NOTE — PROGRESS NOTE ADULT - ASSESSMENT
74 yo F NHR with h/o HTN, congenital solitary kidney, PAF on AC, admitted for weakness and poor oral intake a/w abdominal pain and distension.      #NOLA mostly prerenal   # leukocytosis sepsis / SBO  # Hypotension / shock     # solitary kidney     - creat better non oliguric   - cont IVF LR at 100 cc per hour  - follow BMP IP   - on merren and diflucan dose to GFR of 30 ml/min   - no need for RRT     will follow

## 2024-11-05 NOTE — PROGRESS NOTE ADULT - SUBJECTIVE AND OBJECTIVE BOX
Gastroenterology progress note:     Patient is a 73y old  Female who presents with a chief complaint of septic shock (04 Nov 2024 14:32)       Admitted on: 11-03-24    We are following the patient for abdominal pain     no overnight events in ICU, requiring less pressors, no bms overnight         PAST MEDICAL & SURGICAL HISTORY:  HTN (hypertension)      Diabetes mellitus      Obesity      Gastroesophageal reflux disease      Active asthma      Generalized OA      Afib      H/O hernia repair  2011 and revised in 2013      History of cholecystectomy          MEDICATIONS  (STANDING):  chlorhexidine 2% Cloths 1 Application(s) Topical <User Schedule>  fluconAZOLE IVPB 200 milliGRAM(s) IV Intermittent every 24 hours  lactated ringers. 1000 milliLiter(s) (100 mL/Hr) IV Continuous <Continuous>  meropenem  IVPB      meropenem  IVPB 500 milliGRAM(s) IV Intermittent every 12 hours  norepinephrine Infusion 0.05 MICROgram(s)/kG/Min (8.48 mL/Hr) IV Continuous <Continuous>  pantoprazole  Injectable 40 milliGRAM(s) IV Push daily    MEDICATIONS  (PRN):      Allergies  No Known Allergies      Review of Systems:   Cardiovascular:  No Chest Pain, No Palpitations  Respiratory:  No Cough, No Dyspnea  Gastrointestinal:  As described in HPI  Skin:  No Skin Lesions, No Jaundice  Neuro:  No Syncope, No Dizziness    Physical Examination:  T(C): 37.4 (11-05-24 @ 12:00), Max: 38.6 (11-05-24 @ 01:15)  HR: 94 (11-05-24 @ 12:30) (90 - 117)  BP: 113/65 (11-05-24 @ 12:30) (86/53 - 159/67)  RR: 37 (11-05-24 @ 12:30) (16 - 54)  SpO2: 97% (11-05-24 @ 12:30) (95% - 100%)  Weight (kg): 91.6 (11-05-24 @ 01:00)    11-04-24 @ 07:01  -  11-05-24 @ 07:00  --------------------------------------------------------  IN: 433.5 mL / OUT: 1340 mL / NET: -906.5 mL    11-05-24 @ 07:01  -  11-05-24 @ 13:17  --------------------------------------------------------  IN: 1021.6 mL / OUT: 600 mL / NET: 421.6 mL    GENERAL: no acute distress.  HEAD:  Atraumatic, Normocephalic  EYES: conjunctiva and sclera clear  NECK: Supple, no JVD or thyromegaly  CHEST/LUNG: Clear to auscultation bilaterally  HEART: Regular rate and rhythm; normal S1, S2, No murmurs.  ABDOMEN: Soft, nontender, nondistended  NEUROLOGY: No asterixis or tremor.   SKIN: Intact, no jaundice     Data:                        10.2   19.43 )-----------( 189      ( 05 Nov 2024 05:15 )             30.1     Hgb trend:  10.2  11-05-24 @ 05:15  13.8  11-04-24 @ 07:19  13.9  11-03-24 @ 22:37  13.9  11-03-24 @ 11:10        11-05    144  |  107  |  98[HH]  ----------------------------<  91  3.4[L]   |  21  |  3.1[H]    Ca    10.4      05 Nov 2024 05:15  Mg     1.9     11-05    TPro  4.7[L]  /  Alb  2.9[L]  /  TBili  1.3[H]  /  DBili  x   /  AST  14  /  ALT  33  /  AlkPhos  125[H]  11-05    Liver panel trend:  TBili 1.3   /   AST 14   /   ALT 33   /   AlkP 125   /   Tptn 4.7   /   Alb 2.9    /   DBili --      11-05  TBili 1.6   /   AST 39   /   ALT 55   /   AlkP 149   /   Tptn 5.3   /   Alb 3.2    /   DBili --      11-04  TBili 1.7   /   AST 43   /   ALT 51   /   AlkP 141   /   Tptn 5.5   /   Alb 3.4    /   DBili --      11-03  TBili 1.2   /   AST 18   /   ALT 33   /   AlkP 124   /   Tptn 6.1   /   Alb 3.7    /   DBili --      11-03      PTT - ( 04 Nov 2024 09:21 )  PTT:149.0 sec    Urinalysis with Rflx Culture (collected 03 Nov 2024 13:51)    Culture - Stool (collected 03 Nov 2024 13:51)  Source: .Stool  Preliminary Report (04 Nov 2024 21:38):    No enteric pathogens to date: Final culture pending    Culture - Urine (collected 03 Nov 2024 13:51)  Source: Clean Catch None  Final Report (05 Nov 2024 12:11):    >100,000 CFU/ml Streptococcus gallolyticus "Susceptibilities not    performed"    <10,000 CFU/ml Normal Urogenital rosalva present    Culture - Blood (collected 03 Nov 2024 11:10)  Source: .Blood BLOOD  Preliminary Report (04 Nov 2024 18:01):    No growth at 24 hours    Culture - Blood (collected 03 Nov 2024 11:10)  Source: .Blood BLOOD  Preliminary Report (04 Nov 2024 18:01):    No growth at 24 hours         Radiology:    US Abdomen Upper Quadrant Right:   ACC: [id number] EXAM:  US ABDOMEN RT UPR QUADRANT   ORDERED BY: LUIS FERNANDO FLETCHER     PROCEDURE DATE:  11/05/2024          INTERPRETATION:  CLINICAL INFORMATION: Evaluate for cholecystitis    COMPARISON: CAT scan November 3, 2024    TECHNIQUE: Sonography of the right upper quadrant.    FINDINGS:  Liver: Liver is heterogeneous.  Bile ducts: Within normal limits Common bile duct measures 8 mm.  Gallbladder: Status post cholecystectomy  Pancreas: Obscured  Right kidney: 12.5 cm. No hydronephrosis.  Ascites: None.  IVC: Visualized portions are within normal limits.    IMPRESSION:  Status post cholecystectomy. Heterogeneous poorly visualized liver.   Previously noted portal venous air on prior CAT scan not well appreciated   on this limited exam.        --- End of Report ---            TANJA HU MD; Attending Radiologist  This document has been electronically signed. Nov 5 2024  9:03AM (11-05-24 @ 08:59)

## 2024-11-05 NOTE — PROGRESS NOTE ADULT - ASSESSMENT
73-year-old female PMH A-fib on xarelto,, HTN, s/p cholecystectomy, congenital solitary kidney presents from NH to the ED for evaluation of weakness, decreased PO intake and abdominal distension. Admitted for sepstic shock.    #Septic shock 2/2 cystitis vs intraabdominal infection\  #HAGMA 2/2 lactic acidosis and uremia  - ED, BP 66/45 S/P LR 2800cc bolus with no improvement in BP  - wbc 19.29, vbg lactate 2.0-->2.3, pH 7.2, pCO2 45, Na 133, AG 21, Cr 4.3 (baseline 1.1),  - started on peripheral levo in the ED.  - Patient was tachypneic with high pco2 hence placed on BiPAP  - Ct abdomen/pelvis noted  -s/p cefepime and Vanc in the ED  - started on meropenem as per ID recs given UCx positive for ESBL in the past  - started flucanazole 400mg loading dose and flucanzole 200mg q24 daily  - Trend lactate, ABG, AG  - Fever curve  11/5-   - weaned down to 0.15 levophed now- MAP stabilised - MAP-81  - BIPAP to HFNC 60/60 today- patient stating well.  - Lactate trend 2.3>>  - AG 21 >>      #NOLA likely ATN 2/2 septic shock  - Baseline creatinine - 1.1  - CR on adm- 4.4    #Afib on xarelto  - Hold xarelto given the NOLA  - switch to heparin gtt given CrCl <30    #HTN    #Abdominal distention   - CT noted  - NGT placed for gastric decompression with 3.1L feculent output - intermittent suction  - GI consulted  - c/w abx     RECS:  - Keep NPO   - NGT to intermittent suction   Correct electrolytes   Can use short acting AC   RUQ US   Abx per ID   Will need EGD to r/o GOO when medically optimized.   Recommend surgery follow up and vascular eval       11/5  - NGT tube draining - feculent output        PLAN:    CNS: avoid oversedation    HEENT: Oral care    PULMONARY:  HOB @ 45 degrees, VBG's noted, wean of bipap as tolerated, trend lactate    CARDIOVASCULAR: avoid overload, hold home BP meds for now,  titrate levo to goal MAP of 65, wean levo as tolerated    GI: GI prophylaxis, NPO since on bipap, no intervention per surgery, 3.1L feculent output on suctioning, notify surgery if worsening abdominal pain, GI consult    RENAL:  Follow up lytes.  Correct as needed    INFECTIOUS DISEASE: Follow up cultures, hx of ESBL proteus, will give freddie/vanc for now, ID eval    HEMATOLOGICAL:  hold xarelto for now, switch to heparin gtt given CrCl <30    ENDOCRINE:  Follow up FS.  Insulin protocol if needed.    MUSCULOSKELETAL: AAT      - Cr 4.3 > 4.2, Cr 1.4 on 11/1 and 1.0 on 7/5/24  - strict Is&Os  - daily BMP trend Cr correct lytes as needed  - f/u Ucx and Bcx   - obtain Urine studies  - c/w levophed, give fluids LR 75   - no indication for RRT at this time  - will follow         73-year-old female PMH A-fib on xarelto,, HTN, s/p cholecystectomy, congenital solitary kidney presents from NH to the ED for evaluation of weakness, decreased PO intake and abdominal distension. Admitted for sepstic shock.    #Septic shock 2/2 cystitis vs intraabdominal infection\  #HAGMA 2/2 lactic acidosis and uremia  - ED, BP 66/45 S/P LR 2800cc bolus with no improvement in BP  - wbc 19.29, vbg lactate 2.0-->2.3, pH 7.2, pCO2 45, Na 133, AG 21, Cr 4.3 (baseline 1.1),  - started on peripheral levo in the ED.  - Patient was tachypneic with high pco2 hence placed on BiPAP  - Ct abdomen/pelvis noted  -s/p cefepime and Vanc in the ED  - started on meropenem as per ID recs given UCx positive for ESBL in the past  - started flucanazole 400mg loading dose and flucanzole 200mg q24 daily  - Trend lactate, ABG, AG  - Fever curve  11/5-   - weaned down to 0.15 levophed now- MAP stabilised - MAP-81  - BIPAP to HFNC 60/60 today- patient stating well.  - Lactate trend 2.3>>  - AG 21 >>      #NOLA likely ATN 2/2 septic shock  - Baseline creatinine - 1.1  - CR on adm- 4.4  Nephro consulted  -   #Afib on xarelto  - Hold xarelto given the NOLA  - switch to heparin gtt given CrCl <30    #HTN  - on home -   #Abdominal distention   - CT noted  - NGT placed for gastric decompression with 3.1L feculent output - intermittent suction  - GI consulted  - c/w abx     RECS:  - Keep NPO   - NGT to intermittent suction   Correct electrolytes   Can use short acting AC   RUQ US   Abx per ID   Will need EGD to r/o GOO when medically optimized.   Recommend surgery follow up and vascular eval       11/5  - NGT tube draining - feculent output        PLAN:    CNS: avoid oversedation    HEENT: Oral care    PULMONARY:  HOB @ 45 degrees, VBG's noted, wean of bipap as tolerated, trend lactate    CARDIOVASCULAR: avoid overload, hold home BP meds for now,  titrate levo to goal MAP of 65, wean levo as tolerated    GI: GI prophylaxis, NPO since on bipap, no intervention per surgery, 3.1L feculent output on suctioning, notify surgery if worsening abdominal pain, GI consult    RENAL:  Follow up lytes.  Correct as needed    INFECTIOUS DISEASE: Follow up cultures, hx of ESBL proteus, will give freddie/vanc for now, ID eval    HEMATOLOGICAL:  hold xarelto for now, switch to heparin gtt given CrCl <30    ENDOCRINE:  Follow up FS.  Insulin protocol if needed.    MUSCULOSKELETAL: AAT      - Cr 4.3 > 4.2, Cr 1.4 on 11/1 and 1.0 on 7/5/24  - strict Is&Os  - daily BMP trend Cr correct lytes as needed  - f/u Ucx and Bcx   - obtain Urine studies  - c/w levophed, give fluids LR 75   - no indication for RRT at this time  - will follow         73-year-old female PMH A-fib on xarelto,, HTN, s/p cholecystectomy, congenital solitary kidney presents from NH to the ED for evaluation of weakness, decreased PO intake and abdominal distension. Admitted for sepstic shock.    #Septic shock 2/2 cystitis vs intraabdominal infection\  #HAGMA 2/2 lactic acidosis and uremia  - ED, BP 66/45 S/P LR 2800cc bolus with no improvement in BP  - wbc 19.29, vbg lactate 2.0-->2.3, pH 7.2, pCO2 45, Na 133, AG 21, Cr 4.3 (baseline 1.1),  - started on peripheral levo in the ED.  - Patient was tachypneic with high pco2 hence placed on BiPAP  - Ct abdomen/pelvis noted  -s/p cefepime and Vanc in the ED  - started on meropenem as per ID recs given UCx positive for ESBL in the past  - started flucanazole 400mg loading dose and flucanzole 200mg q24 daily  - Trend lactate, ABG, AG  - Fever curve  11/5-   - weaned down to 0.15 levophed now- MAP stabilised - MAP-81  - BIPAP to HFNC 60/60 today- patient stating well.  - Lactate trend 2.3>>3.2>>1.5- WNL  - AG 21 >>32>>26- resolving  - started on LR @100cc/hr       #NOLA likely ATN 2/2 septic shock  - Baseline creatinine - 1.1  - CR on adm- 4.4  - started on LR @100cc/hr     Nephro consulted  -  Cr 4.3 > 4.2, Cr 1.4 on 11/1 and 1.0 on 7/5/24  - strict Is&Os  - daily BMP trend Cr correct lytes as needed  - f/u Ucx and Bcx   - obtain Urine studies  - c/w levophed, give fluids LR 75   - dose meropenem and flucanazole as eGFR of 30      #Abdominal distention   #pneumoperitoneum  - CT noted  - NGT placed for gastric decompression with 3.1L feculent output - intermittent suction  - GI consulted  - c/w abx     RECS:  - Keep NPO   - NGT to intermittent suction   Correct electrolytes   Can use short acting AC   RUQ US   Abx per ID   Will need EGD to r/o GOO when medically optimized.   Recommend surgery follow up and vascular eval       11/5  - NGT tube draining - feculent output    #Afib on xarelto  - Hold xarelto given the NOAL  - switch to heparin gtt given CrCl <30    #HTN  - on home - coozar 100 and nefedepine 60  - hold home BP meds    MISC    #DVT prophylaxis: None for now SCDs  #GI prophylaxis: PPI  #Diet: NPO for now  #Activity: out of bed to chair  #Code status: Full Code  #Disposition: Acute    #Handoff  -f/u echo  - f/u GI recs  - f/u vascular recs

## 2024-11-05 NOTE — PROGRESS NOTE ADULT - ASSESSMENT
IMPRESSION:    Septic shock  Acute hypoxemic respiratory failure   UTI  Severe gastric distension w/ pneumatosis along posterior gastric wall.  RO GOO   Possible aspiration   Intraperitoneal free air  HAGMA  NOLA non oliguric  Afib on Xarelto  HO HTN  HO ESBL Proteus    PLAN:    CNS: Avoid depressants.  pain control if needed.      HEENT: Oral care    PULMONARY:  HOB @ 45 degrees.  Aspiration precautions. Wean o2 as tolerated.      CARDIOVASCULAR: TTE.  Goal directed fluid resuscitation fluid deplete.  LR bolus.  Wean Levophed.  Target MAP 65.  Trend LA    GI: GI prophylaxis.  NPO w/ NGT to suction.  Surgery following.  GI eval noted noted.  Daily KUB.  RUQ sono.  trend LFTs     RENAL:  Follow up lytes.  Correct as needed.  Strict intake and output.  Burger for is and Os     INFECTIOUS DISEASE: Follow up cultures.  Bibi, Vanco and Fluconazole.  ID eval noted.  MRSA nares.  GI PCR.    HEMATOLOGICAL:  DVT prophylaxis.  Hold full dose anticoagulation.  Monitor CBC and coags     ENDOCRINE:  Follow up FS.  Insulin protocol if needed.    MUSCULOSKELETAL: Bed chair position.  Off loading    Full code    MICU

## 2024-11-05 NOTE — PROGRESS NOTE ADULT - SUBJECTIVE AND OBJECTIVE BOX
73-year-old female PMH A-fib on xarelto,, HTN, s/p cholecystectomy, congenital solitary kidney presents from NH to the ED for evaluation of weakness, decreased PO intake and abdominal distension. Pt's son Everton says pt has not been feeling well the past few days, and has had poor appetite which is unlike her. She's been more tired and weak, unable to get out of bed. Has not had a bowel movement in 3 days, pt unsure if she's been passing gas otherwise. She was noted to be hypotensive and have an elevated wbc count at the NH as well. No dysuria, chest pain, SOB, cough or fever per pt otherwise.  No other complaints currently.     In the ED, BP 66/45 S/P LR 2800cc bolus with no improvement in BP, started on peripheral levo. Labs with wbc 19.29, vbg lactate 2.0-->2.3, pH 7.2, pCO2 45, Na 133, AG 21, Cr 4.3 (baseline 1.1), UA contaminated. CTAP with severe gastric distension, multiple foci of intraperitoneal free air and urinary bladder thickening with free air. Surgery consulted, no intervention for now, NGT placed for gastric decompression with 3.1L feculent output, recommend GI consult.       Physical Exam: GENERAL: elderly appearing, on bipap  HEENT: Normocephalic, atraumatic  PULMONARY: Clear to auscultation bilaterally. No rales, ronchi, or wheezing.   CARDIOVASCULAR: Regular rate and rhythm, S1-S2, no murmurs   GASTROINTESTINAL: Soft, non-tender, non-distended, no guarding.   SKIN/EXTREMITIES: No LE edema b/l  NEUROLOGIC: AAOX3      Prior EGD/ Colonoscopy:  < from: EGD w/ PEG Placement (02.16.23 @ 10:30) >  Impressions:    Grade D esophagitis compatible with erosive esophagitis.    Erythema in the stomach compatible with non-erosive gastritis.    Hiatal Hernia.    Normal mucosa in the whole examined duodenum.    < end of copied text >    US ABDOMEN RT UPR QUADRANT  IMPRESSION:  Status post cholecystectomy. Heterogeneous poorly visualized liver.   Previously noted portal venous air on prior CAT scan not well appreciated   on this limited exam.    XR CHEST PORTABLE ROUTINE 1V  IMPRESSION:    1. Unchanged bilateral opacities/pleural effusions.      Summary:   1. Left ventricular ejection fraction, by visual estimation, is >70%.   2. Hyperdynamic global left ventricular systolic function.   3. The left ventricular diastolic function could not be assessed in this   study.   4. Left atrial enlargement.   5. Calcified aortic valve with normal opening.   6. Mild tricuspid regurgitation.   7. Estimated pulmonary artery systolic pressure is 39.5 mmHg assuming a   right atrial pressure of 3 mmHg, which is consistent with borderline   pulmonary hypertension.   SUBJECTIVE/OVERNIGHT EVENTS  Today is hospital day 2d. This morning patient was seen and examined at bedside, resting comfortably in bed. No acute or major events overnight. patient BP stabilized over 0.15 levopheed. MAP-81. Swicthed from BiPAP to HFNC. NGT suction draining brown fluid - possible fecal matter. Vascular surgery consulted in suspicion of mesentric ischemia.    HOSPITAL COURSE  73-year-old female PMH A-fib on xarelto,, HTN, s/p cholecystectomy, congenital solitary kidney presents from NH to the ED for evaluation of weakness, decreased PO intake and abdominal distension. Pt's son Everton says pt has not been feeling well the past few days, and has had poor appetite which is unlike her. She's been more tired and weak, unable to get out of bed. Has not had a bowel movement in 3 days, pt unsure if she's been passing gas otherwise. She was noted to be hypotensive and have an elevated wbc count at the NH as well. No dysuria, chest pain, SOB, cough or fever per pt otherwise.  No other complaints currently.     In the ED, BP 66/45 S/P LR 2800cc bolus with no improvement in BP, started on peripheral levo. Labs with wbc 19.29, vbg lactate 2.0-->2.3, pH 7.2, pCO2 45, Na 133, AG 21, Cr 4.3 (baseline 1.1), UA contaminated. CTAP with severe gastric distension, multiple foci of intraperitoneal free air and urinary bladder thickening with free air. Surgery consulted, no intervention for now, NGT placed for gastric decompression with 3.1L feculent output, recommend GI consult.      MEDICATIONS  STANDING MEDICATIONS  chlorhexidine 2% Cloths 1 Application(s) Topical <User Schedule>  fluconAZOLE IVPB 200 milliGRAM(s) IV Intermittent every 24 hours  lactated ringers. 1000 milliLiter(s) IV Continuous <Continuous>  meropenem  IVPB      meropenem  IVPB 500 milliGRAM(s) IV Intermittent every 12 hours  norepinephrine Infusion 0.05 MICROgram(s)/kG/Min IV Continuous <Continuous>  pantoprazole  Injectable 40 milliGRAM(s) IV Push daily    PRN MEDICATIONS    VITALS  T(F): 99.2 (11-05-24 @ 08:00), Max: 101.4 (11-05-24 @ 01:15)  HR: 94 (11-05-24 @ 11:15) (90 - 117)  BP: 152/68 (11-05-24 @ 11:15) (86/53 - 156/69)  RR: 38 (11-05-24 @ 11:15) (16 - 54)  SpO2: 97% (11-05-24 @ 11:15) (91% - 100%)    Physical Exam:   GENERAL: elderly appearing, on bipap>> switched to HFNC  HEENT: Normocephalic, atraumatic  PULMONARY: Clear to auscultation bilaterally. No rales, ronchi, or wheezing.   CARDIOVASCULAR: Regular rate and rhythm, S1-S2, no murmurs   GASTROINTESTINAL: Soft, non-tender, non-distended, no guarding.   SKIN/EXTREMITIES: No LE edema b/l  NEUROLOGIC: AAOX3    LABS             10.2   19.43 )-----------( 189      ( 11-05-24 @ 05:15 )             30.1     144  |  107  |  98  -------------------------<  91   11-05-24 @ 05:15  3.4  |  21  |  3.1    Ca      10.4     11-05-24 @ 05:15  Mg     1.9     11-05-24 @ 05:15    TPro  4.7  /  Alb  2.9  /  TBili  1.3  /  DBili  x   /  AST  14  /  ALT  33  /  AlkPhos  125  /  GGT  x     11-05-24 @ 05:15    PTT - ( 11-04-24 @ 09:21 )  PTT:149.0 sec      Urinalysis Basic - ( 05 Nov 2024 05:15 )    Color: x / Appearance: x / SG: x / pH: x  Gluc: 91 mg/dL / Ketone: x  / Bili: x / Urobili: x   Blood: x / Protein: x / Nitrite: x   Leuk Esterase: x / RBC: x / WBC x   Sq Epi: x / Non Sq Epi: x / Bacteria: x      ABG - ( 04 Nov 2024 13:35 )  pH, Arterial: 7.38  pH, Blood: x     /  pCO2: 33    /  pO2: 70    / HCO3: 20    / Base Excess: -4.9  /  SaO2: 92.6                Urinalysis with Rflx Culture (collected 03 Nov 2024 13:51)    Culture - Stool (collected 03 Nov 2024 13:51)  Source: .Stool  Preliminary Report (04 Nov 2024 21:38):    No enteric pathogens to date: Final culture pending    Culture - Blood (collected 03 Nov 2024 11:10)  Source: .Blood BLOOD  Preliminary Report (04 Nov 2024 18:01):    No growth at 24 hours    Culture - Blood (collected 03 Nov 2024 11:10)  Source: .Blood BLOOD  Preliminary Report (04 Nov 2024 18:01):    No growth at 24 hours      IMAGING    Prior EGD/ Colonoscopy:  < from: EGD w/ PEG Placement (02.16.23 @ 10:30) >  Impressions:    Grade D esophagitis compatible with erosive esophagitis.    Erythema in the stomach compatible with non-erosive gastritis.    Hiatal Hernia.    Normal mucosa in the whole examined duodenum.    < end of copied text >    US ABDOMEN RT UPR QUADRANT  IMPRESSION:  Status post cholecystectomy. Heterogeneous poorly visualized liver.   Previously noted portal venous air on prior CAT scan not well appreciated   on this limited exam.    XR CHEST PORTABLE ROUTINE 1V  IMPRESSION:    1. Unchanged bilateral opacities/pleural effusions.    ECHO- 02/23  Summary:   1. Left ventricular ejection fraction, by visual estimation, is >70%.   2. Hyperdynamic global left ventricular systolic function.   3. The left ventricular diastolic function could not be assessed in this   study.   4. Left atrial enlargement.   5. Calcified aortic valve with normal opening.   6. Mild tricuspid regurgitation.   7. Estimated pulmonary artery systolic pressure is 39.5 mmHg assuming a   right atrial pressure of 3 mmHg, which is consistent with borderline   pulmonary hypertension.    ECHO- pending    CT ABDOMEN AND PELVIS    IMPRESSION:    1. Severe gastric distention extending to the level of the first/second   portion of duodenum, with new pneumatosis along the posterior gastric   wall, suspicious for ischemia; multiple adjacent foci of free   intraperitoneal air are noted.  2. New small linear foci of air within the peripheral liver, suspicious   for portal venous gas.  3. Thick-walled urinary bladder containing multiple foci of air; findings   may be related to recent intervention and/or underlying cystitis, however   however please note colovesicular fistula can have this appearance.   Clinical correlation is suggested.  4. Small bilateral pleural effusions with small bibasilar   opacities/atelectasis.

## 2024-11-05 NOTE — PROGRESS NOTE ADULT - ASSESSMENT
ASSESSMENT  73-year-old female PMH A-fib on xarelto,, HTN, s/p cholecystectomy, congenital solitary kidney presents from NH to the ED for evaluation of weakness, decreased PO intake and abdominal distension. Pt's son Everton says pt has not been feeling well the past few days, and has had poor appetite which is unlike her. She's been more tired and weak, unable to get out of bed. Has not had a bowel movement in 3 days, pt unsure if she's been passing gas otherwise. She was noted to be hypotensive and have an elevated wbc count at the NH as well. No dysuria, chest pain, SOB, cough or fever per pt otherwise.  No other complaints currently.     In the ED, BP 66/45 S/P LR 2800cc bolus with no improvement in BP, started on peripheral levo. Labs with wbc 19.29, vbg lactate 2.0-->2.3, pH 7.2, pCO2 45, Na 133, AG 21, Cr 4.3 (baseline 1.1), UA contaminated. CTAP with severe gastric distension, multiple foci of intraperitoneal free air and urinary bladder thickening with free air. Surgery consulted, no intervention for now, NGT placed for gastric decompression with 3.1L feculent output,     IMPRESSIONS  #Septic shock requiring pressors   Rule out acute cystitis/pyelo as UA pyuria > 900  Rule out mesenteric ischemia- Severe gastric distension w/ pneumatosis along posterior gastric wall. Free air     Admission WBC 19--> 21    11/3 BCX NGTD x2  < from: CT Abdomen and Pelvis No Cont (11.03.24 @ 11:37) >  1. Severe gastric distention extending to the level of the first/second   portion of duodenum, with new pneumatosis along the posterior gastric   wall, suspicious for ischemia; multiple adjacent foci of free   intraperitoneal air are noted.  2. New small linear foci of air within the peripheral liver, suspicious for portal venous gas.  3. Thick-walled urinary bladder containing multiple foci of air; findings may be related to recent intervention and/or underlying cystitis, however   however please note colovesicular fistula can have this appearance. Clinical correlation is suggested.  4. Small bilateral pleural effusions with small bibasilar opacities/atelectasis.    Stool cx NG    1/2023 UCX ESBL proteus     Cdiff (-)   #Lactic acidosis  #NOLA     RECOMMENDATIONS  - meropenem  IVPB 500 milliGRAM(s) IV Intermittent every 12 hours  - fluconazole 200mg daily IV, monitor LFTs. Obtain EKG to check QTC   - Vanc dosing AUC/NAGI per clinical pharmacist , D/C if UCX without GP organism   - f/u UCX  - Send GI PCR , more sensitive than stool cx  - GI / Surgery   - Poor prognosis, GOC    If any questions, please text or call on [employer] Teams  Please continue to update ID with any pertinent new clinical, laboratory or radiographic findings

## 2024-11-05 NOTE — PROGRESS NOTE ADULT - SUBJECTIVE AND OBJECTIVE BOX
GENERAL SURGERY PROGRESS NOTE     PATRICIA SPRAGUE  [address] [place] [address] day :3d      Surgical Attending: Dr. Peters  24 hour events: NGT intact, levo 15, on bipap    PHYSICAL EXAM:  General Appearance: NAD  HEENT: Normocephalic, atraumatic, NG tube in place, feculent colored output  Heart: HR 90- low 100s  Lungs: Saturating 100% on BIPAP 12/8 and FIO2 of 70%.   Abdomen:  Soft, nontender, nondistended. No rebound or guarding.  MSK/Extremities: Warm & well-perfused.   Skin: Warm, dry. No jaundice.       T(F): 99.4 (11-05-24 @ 12:00), Max: 101.4 (11-05-24 @ 01:15)  HR: 97 (11-05-24 @ 15:00) (90 - 117)  BP: 137/63 (11-05-24 @ 15:00) (86/53 - 159/67)  ABP: --  ABP(mean): --  RR: 40 (11-05-24 @ 15:00) (16 - 54)  SpO2: 90% (11-05-24 @ 15:00) (78% - 100%)    IN'S / OUT's:    11-04-24 @ 07:01  -  11-05-24 @ 07:00  --------------------------------------------------------  IN:    IV PiggyBack: 250 mL    Norepinephrine: 183.5 mL  Total IN: 433.5 mL    OUT:    Indwelling Catheter - Urethral (mL): 990 mL    Nasogastric/Oral tube (mL): 350 mL  Total OUT: 1340 mL    Total NET: -906.5 mL      11-05-24 @ 07:01  -  11-05-24 @ 15:03  --------------------------------------------------------  IN:    IV PiggyBack: 100 mL    Lactated Ringers: 75 mL    Lactated Ringers: 400 mL    Lactated Ringers Bolus: 500 mL    Norepinephrine: 165.1 mL  Total IN: 1240.1 mL    OUT:    Indwelling Catheter - Urethral (mL): 1100 mL    Nasogastric/Oral tube (mL): 350 mL  Total OUT: 1450 mL    Total NET: -209.9 mL          MEDICATIONS  (STANDING):  chlorhexidine 2% Cloths 1 Application(s) Topical <User Schedule>  fluconAZOLE IVPB 200 milliGRAM(s) IV Intermittent every 24 hours  lactated ringers. 1000 milliLiter(s) (100 mL/Hr) IV Continuous <Continuous>  meropenem  IVPB      meropenem  IVPB 500 milliGRAM(s) IV Intermittent every 12 hours  norepinephrine Infusion 0.05 MICROgram(s)/kG/Min (8.48 mL/Hr) IV Continuous <Continuous>  pantoprazole  Injectable 40 milliGRAM(s) IV Push daily    MEDICATIONS  (PRN):      LABS  Labs:  CAPILLARY BLOOD GLUCOSE                              10.2   19.43 )-----------( 189      ( 05 Nov 2024 05:15 )             30.1       Auto Neutrophil %: 87.1 % (11-05-24 @ 05:15)  Auto Immature Granulocyte %: 0.8 % (11-05-24 @ 05:15)    11-05    144  |  107  |  98[HH]  ----------------------------<  91  3.4[L]   |  21  |  3.1[H]      Calcium: 10.4 mg/dL (11-05-24 @ 05:15)      LFTs:             4.7  | 1.3  | 14       ------------------[125     ( 05 Nov 2024 05:15 )  2.9  | x    | 33          Lipase:x      Amylase:x         Lactate, Blood: 1.5 mmol/L (11-04-24 @ 16:58)  Blood Gas Arterial, Lactate: 1.0 mmol/L (11-04-24 @ 13:35)  Lactate, Blood: 3.2 mmol/L (11-04-24 @ 07:19)  Blood Gas Arterial, Lactate: 1.2 mmol/L (11-04-24 @ 02:28)  Lactate, Blood: 2.9 mmol/L (11-03-24 @ 22:37)  Blood Gas Venous - Lactate: 2.3 mmol/L (11-03-24 @ 19:09)  Lactate, Blood: 2.5 mmol/L (11-03-24 @ 15:33)  Blood Gas Venous - Lactate: 2.0 mmol/L (11-03-24 @ 11:54)  Lactate, Blood: 2.2 mmol/L (11-03-24 @ 11:10)    ABG - ( 04 Nov 2024 13:35 )  pH: 7.38  /  pCO2: 33    /  pO2: 70    / HCO3: 20    / Base Excess: -4.9  /  SaO2: 92.6            ABG - ( 04 Nov 2024 02:28 )  pH: 7.35  /  pCO2: 30    /  pO2: 80    / HCO3: 17    / Base Excess: -7.8  /  SaO2: 94.2              Coags:     x      ----< x       ( 04 Nov 2024 09:21 )     149.0               Urinalysis Basic - ( 05 Nov 2024 05:15 )    Color: x / Appearance: x / SG: x / pH: x  Gluc: 91 mg/dL / Ketone: x  / Bili: x / Urobili: x   Blood: x / Protein: x / Nitrite: x   Leuk Esterase: x / RBC: x / WBC x   Sq Epi: x / Non Sq Epi: x / Bacteria: x        Urinalysis with Rflx Culture (collected 03 Nov 2024 13:51)    Culture - Stool (collected 03 Nov 2024 13:51)  Source: .Stool  Preliminary Report (04 Nov 2024 21:38):    No enteric pathogens to date: Final culture pending    Culture - Urine (collected 03 Nov 2024 13:51)  Source: Clean Catch None  Final Report (05 Nov 2024 12:11):    >100,000 CFU/ml Streptococcus gallolyticus "Susceptibilities not    performed"    <10,000 CFU/ml Normal Urogenital rosalva present    Culture - Blood (collected 03 Nov 2024 11:10)  Source: .Blood BLOOD  Preliminary Report (04 Nov 2024 18:01):    No growth at 24 hours    Culture - Blood (collected 03 Nov 2024 11:10)  Source: .Blood BLOOD  Preliminary Report (04 Nov 2024 18:01):    No growth at 24 hours          RADIOLOGY & ADDITIONAL TESTS:

## 2024-11-05 NOTE — PROGRESS NOTE ADULT - SUBJECTIVE AND OBJECTIVE BOX
Nephrology progress note  Patient is seen and examined, events over the last 24 h noted .  lethargic lying in bed   SOB on NIPPV  Allergies:  No Known Allergies    Hospital Medications:   MEDICATIONS  (STANDING):    fluconAZOLE IVPB 200 milliGRAM(s) IV Intermittent every 24 hours  lactated ringers Bolus 500 milliLiter(s) IV Bolus once  lactated ringers. 1000 milliLiter(s) (75 mL/Hr) IV Continuous <Continuous>  meropenem  IVPB 500 milliGRAM(s) IV Intermittent every 12 hours  norepinephrine Infusion 0.05 MICROgram(s)/kG/Min (8.48 mL/Hr) IV Continuous <Continuous>  pantoprazole  Injectable 40 milliGRAM(s) IV Push daily        VITALS:  T(F): 99.2 (11-05-24 @ 08:00), Max: 101.4 (11-05-24 @ 01:15)  HR: 91 (11-05-24 @ 09:45)  BP: 131/61 (11-05-24 @ 09:45)  RR: 32 (11-05-24 @ 09:45)  SpO2: 99% (11-05-24 @ 09:45)      11-03 @ 07:01  -  11-04 @ 07:00  --------------------------------------------------------  IN: 0 mL / OUT: 590 mL / NET: -590 mL    11-04 @ 07:01  -  11-05 @ 07:00  --------------------------------------------------------  IN: 433.5 mL / OUT: 1340 mL / NET: -906.5 mL    11-05 @ 07:01  -  11-05 @ 10:19  --------------------------------------------------------  IN: 825.6 mL / OUT: 300 mL / NET: 525.6 mL      Height (cm): 167.6 (11-05 @ 01:00)  Weight (kg): 91.6 (11-05 @ 01:00)  BMI (kg/m2): 32.6 (11-05 @ 01:00)  BSA (m2): 2.01 (11-05 @ 01:00)    PHYSICAL EXAM:  Constitutional: lethargic   Respiratory: CTAB, no wheezes, rales or rhonchi  Cardiovascular: S1, S2, RRR  Gastrointestinal: BS+, soft, NT/ND  Extremities: No cyanosis or clubbing. No peripheral edema  :  No ya.   Skin: No rashes    LABS:  11-05    144  |  107  |  98[HH]  ----------------------------<  91  3.4[L]   |  21  |  3.1[H]    Creatinine Trend: 3.1<--, 4.2<--, 4.1<--, 4.3<--    Ca    10.4      05 Nov 2024 05:15  Mg     1.9     11-05    TPro  4.7[L]  /  Alb  2.9[L]  /  TBili  1.3[H]  /  DBili      /  AST  14  /  ALT  33  /  AlkPhos  125[H]  11-05                          10.2   19.43 )-----------( 189      ( 05 Nov 2024 05:15 )             30.1       Urine Studies:  Urinalysis Basic - ( 05 Nov 2024 05:15 )    Color:  / Appearance:  / SG:  / pH:   Gluc: 91 mg/dL / Ketone:   / Bili:  / Urobili:    Blood:  / Protein:  / Nitrite:    Leuk Esterase:  / RBC:  / WBC    Sq Epi:  / Non Sq Epi:  / Bacteria:       Sodium, Random Urine: 42.0 mmoL/L (11-04 @ 20:45)  Creatinine, Random Urine: 62 mg/dL (11-04 @ 20:45)  Protein/Creatinine Ratio Calculation: 0.4 Ratio (11-04 @ 20:45)  Osmolality, Random Urine: 372 mos/kg (11-04 @ 20:45)  Potassium, Random Urine: 21 mmol/L (11-04 @ 20:45)      HbA1c 5.6      [12-20-19 @ 07:09]          RADIOLOGY & ADDITIONAL STUDIES:

## 2024-11-05 NOTE — PROGRESS NOTE ADULT - ATTENDING COMMENTS

## 2024-11-05 NOTE — PROGRESS NOTE ADULT - SUBJECTIVE AND OBJECTIVE BOX
Patient is a 73y old  Female who presents with a chief complaint of septic shock (04 Nov 2024 14:32)        Over Night Events:  On BiPAP this am 40% O2.  ON levophed         ROS:     All ROS are negative except HPI         PHYSICAL EXAM    ICU Vital Signs Last 24 Hrs  T(C): 37.3 (05 Nov 2024 08:00), Max: 38.6 (05 Nov 2024 01:15)  T(F): 99.2 (05 Nov 2024 08:00), Max: 101.4 (05 Nov 2024 01:15)  HR: 93 (05 Nov 2024 08:30) (88 - 117)  BP: 125/56 (05 Nov 2024 08:30) (75/40 - 134/60)  BP(mean): 79 (05 Nov 2024 08:30) (55 - 90)  ABP: --  ABP(mean): --  RR: 26 (05 Nov 2024 08:30) (18 - 43)  SpO2: 97% (05 Nov 2024 08:30) (91% - 100%)    O2 Parameters below as of 05 Nov 2024 08:00  Patient On (Oxygen Delivery Method): BiPAP/CPAP    O2 Concentration (%): 60        CONSTITUTIONAL:   NAD    ENT:   Airway patent,   Mouth with normal mucosa.       EYES:   Pupils equal,   Round and reactive to light.    CARDIAC:   Normal rate,   Regular rhythm.      RESPIRATORY:   No wheezing  Bilateral BS  Normal chest expansion  Not tachypneic,  No use of accessory muscles    GASTROINTESTINAL:  Abdomen soft,   Non-tender,   No guarding,   + BS    MUSCULOSKELETAL:   Range of motion is not limited,  No clubbing, cyanosis    NEUROLOGICAL:   Alert and oriented   No motor  deficits.    SKIN:   Skin normal color for race,   Warm and dry   No evidence of rash.        11-04-24 @ 07:01  -  11-05-24 @ 07:00  --------------------------------------------------------  IN:    IV PiggyBack: 250 mL    Norepinephrine: 183.5 mL  Total IN: 433.5 mL    OUT:    Indwelling Catheter - Urethral (mL): 990 mL    Nasogastric/Oral tube (mL): 350 mL  Total OUT: 1340 mL    Total NET: -906.5 mL      11-05-24 @ 07:01  -  11-05-24 @ 08:37  --------------------------------------------------------  IN:    IV PiggyBack: 100 mL    Lactated Ringers: 75 mL    Norepinephrine: 51.6 mL  Total IN: 226.6 mL    OUT:    Indwelling Catheter - Urethral (mL): 200 mL  Total OUT: 200 mL    Total NET: 26.6 mL          LABS:                            10.2   19.43 )-----------( 189      ( 05 Nov 2024 05:15 )             30.1                                               11-05    144  |  107  |  98[HH]  ----------------------------<  91  3.4[L]   |  21  |  3.1[H]    Creatinine Trend  BUN 98, Cr 3.1, (11-05-24 @ 05:15)  Creatinine Trend  , Cr 4.2, (11-04-24 @ 07:19)  Creatinine Trend  , Cr 4.1, (11-03-24 @ 22:37)  Creatinine Trend  BUN 97, Cr 4.3, (11-03-24 @ 11:10)      Ca    10.4      05 Nov 2024 05:15  Mg     1.9     11-05    TPro  4.7[L]  /  Alb  2.9[L]  /  TBili  1.3[H]  /  DBili  x   /  AST  14  /  ALT  33  /  AlkPhos  125[H]  11-05      PT/INR - ( 03 Nov 2024 11:10 )   PT: 13.10 sec;   INR: 1.11 ratio         PTT - ( 04 Nov 2024 09:21 )  PTT:149.0 sec                                       Urinalysis Basic - ( 05 Nov 2024 05:15 )    Color: x / Appearance: x / SG: x / pH: x  Gluc: 91 mg/dL / Ketone: x  / Bili: x / Urobili: x   Blood: x / Protein: x / Nitrite: x   Leuk Esterase: x / RBC: x / WBC x   Sq Epi: x / Non Sq Epi: x / Bacteria: x                                                  LIVER FUNCTIONS - ( 05 Nov 2024 05:15 )  Alb: 2.9 g/dL / Pro: 4.7 g/dL / ALK PHOS: 125 U/L / ALT: 33 U/L / AST: 14 U/L / GGT: x                                                  Urinalysis with Rflx Culture (collected 03 Nov 2024 13:51)    Culture - Stool (collected 03 Nov 2024 13:51)  Source: .Stool  Preliminary Report (04 Nov 2024 21:38):    No enteric pathogens to date: Final culture pending    Culture - Blood (collected 03 Nov 2024 11:10)  Source: .Blood BLOOD  Preliminary Report (04 Nov 2024 18:01):    No growth at 24 hours    Culture - Blood (collected 03 Nov 2024 11:10)  Source: .Blood BLOOD  Preliminary Report (04 Nov 2024 18:01):    No growth at 24 hours                                                                                       ABG - ( 04 Nov 2024 13:35 )  pH, Arterial: 7.38  pH, Blood: x     /  pCO2: 33    /  pO2: 70    / HCO3: 20    / Base Excess: -4.9  /  SaO2: 92.6                MEDICATIONS  (STANDING):  chlorhexidine 2% Cloths 1 Application(s) Topical <User Schedule>  fluconAZOLE IVPB 200 milliGRAM(s) IV Intermittent every 24 hours  lactated ringers. 1000 milliLiter(s) (75 mL/Hr) IV Continuous <Continuous>  meropenem  IVPB      meropenem  IVPB 500 milliGRAM(s) IV Intermittent every 12 hours  norepinephrine Infusion 0.05 MICROgram(s)/kG/Min (8.48 mL/Hr) IV Continuous <Continuous>  pantoprazole  Injectable 40 milliGRAM(s) IV Push daily    MEDICATIONS  (PRN):      New X-rays reviewed:                                                                                  ECHO    CXR interpreted by me:  Right sided infiltrate

## 2024-11-05 NOTE — PROGRESS NOTE ADULT - SUBJECTIVE AND OBJECTIVE BOX
DARCIE PATRICIA  73y, Female  Allergy: No Known Allergies      LOS  2d    CHIEF COMPLAINT: septic shock (04 Nov 2024 14:32)      INTERVAL EVENTS/HPI  - Febrile overnight     11/3 BCX NGTD x2  - T(F): , Max: 101.4 (11-05-24 @ 01:15)  - Tolerating medication  - WBC Count: 19.43 (11-05-24 @ 05:15)  WBC Count: 18.81 (11-04-24 @ 07:19)  - Creatinine: 3.1 (11-05-24 @ 05:15)  Creatinine: 4.2 (11-04-24 @ 07:19)       ROS  ***    VITALS:  T(F): 99.2, Max: 101.4 (11-05-24 @ 01:15)  HR: 94  BP: 156/69  RR: 36Vital Signs Last 24 Hrs  T(C): 37.3 (05 Nov 2024 08:00), Max: 38.6 (05 Nov 2024 01:15)  T(F): 99.2 (05 Nov 2024 08:00), Max: 101.4 (05 Nov 2024 01:15)  HR: 94 (05 Nov 2024 09:00) (88 - 117)  BP: 156/69 (05 Nov 2024 09:00) (75/40 - 156/69)  BP(mean): 99 (05 Nov 2024 09:00) (55 - 99)  RR: 36 (05 Nov 2024 09:00) (18 - 43)  SpO2: 96% (05 Nov 2024 09:00) (91% - 100%)    Parameters below as of 05 Nov 2024 09:00  Patient On (Oxygen Delivery Method): nasal cannula, high flow  O2 Flow (L/min): 60  O2 Concentration (%): 60    PHYSICAL EXAM:  ***    FH: Non-contributory  Social Hx: Non-contributory    TESTS & MEASUREMENTS:                        10.2   19.43 )-----------( 189      ( 05 Nov 2024 05:15 )             30.1     11-05    144  |  107  |  98[HH]  ----------------------------<  91  3.4[L]   |  21  |  3.1[H]    Ca    10.4      05 Nov 2024 05:15  Mg     1.9     11-05    TPro  4.7[L]  /  Alb  2.9[L]  /  TBili  1.3[H]  /  DBili  x   /  AST  14  /  ALT  33  /  AlkPhos  125[H]  11-05      LIVER FUNCTIONS - ( 05 Nov 2024 05:15 )  Alb: 2.9 g/dL / Pro: 4.7 g/dL / ALK PHOS: 125 U/L / ALT: 33 U/L / AST: 14 U/L / GGT: x           Urinalysis Basic - ( 05 Nov 2024 05:15 )    Color: x / Appearance: x / SG: x / pH: x  Gluc: 91 mg/dL / Ketone: x  / Bili: x / Urobili: x   Blood: x / Protein: x / Nitrite: x   Leuk Esterase: x / RBC: x / WBC x   Sq Epi: x / Non Sq Epi: x / Bacteria: x        Urinalysis with Rflx Culture (collected 11-03-24 @ 13:51)    Culture - Stool (collected 11-03-24 @ 13:51)  Source: .Stool  Preliminary Report (11-04-24 @ 21:38):    No enteric pathogens to date: Final culture pending    Culture - Blood (collected 11-03-24 @ 11:10)  Source: .Blood BLOOD  Preliminary Report (11-04-24 @ 18:01):    No growth at 24 hours    Culture - Blood (collected 11-03-24 @ 11:10)  Source: .Blood BLOOD  Preliminary Report (11-04-24 @ 18:01):    No growth at 24 hours        Lactate, Blood: 1.5 mmol/L (11-04-24 @ 16:58)  Lactate, Blood: 3.2 mmol/L (11-04-24 @ 07:19)  Lactate, Blood: 2.9 mmol/L (11-03-24 @ 22:37)  Blood Gas Venous - Lactate: 2.3 mmol/L (11-03-24 @ 19:09)  Lactate, Blood: 2.5 mmol/L (11-03-24 @ 15:33)  Blood Gas Venous - Lactate: 2.0 mmol/L (11-03-24 @ 11:54)  Lactate, Blood: 2.2 mmol/L (11-03-24 @ 11:10)      INFECTIOUS DISEASES TESTING  Vancomycin Level, Random: 7.4 (11-05-24 @ 05:15)  MRSA PCR Result.: Negative (11-04-24 @ 13:15)  Vancomycin Level, Random: 8.2 (11-04-24 @ 07:19)  strept    INFLAMMATORY MARKERS      RADIOLOGY & ADDITIONAL TESTS:  I have personally reviewed the last available Chest xray  CXR  Xray Chest 1 View- PORTABLE-Urgent:   ACC: [id number] EXAM:  XR CHEST PORTABLE URGENT 1V   ORDERED BY: ALTA SOTO     PROCEDURE DATE:  11/03/2024          INTERPRETATION:  Clinical History / Reason for exam: Enteric tube   placement.    Comparison : Chest radiograph 11/3/2024.    Technique/Positioning: Single frontal chest x-ray obtained.    Findings:    Support devices: Enteric tube extends below diaphragm..    Cardiac/mediastinum/hilum: Unchanged.    Lung parenchyma/Pleura: Unchanged bibasilar opacities.    Skeleton/soft tissues: Unchanged.    Impression:    Enteric tube extends below diaphragm..    --- End of Report ---            JAY ROBLES MD; Attending Radiologist  This document has been electronically signed. Nov  3 2024  8:56PM (11-03-24 @ 17:18)      CT  CT Abdomen and Pelvis No Cont:   ACC: [id number] EXAM:  CT ABDOMEN AND PELVIS   ORDERED BY: DAVEY ALLEN     PROCEDURE DATE:  11/03/2024          INTERPRETATION:  CLINICAL INFORMATION: Abdominal pain.    COMPARISON: January 31, 2023.    CONTRAST/COMPLICATIONS:  IV Contrast: NONE  Oral Contrast: NONE  Complications: None reported at time of study completion    PROCEDURE:  CT of the Abdomen and Pelvis was performed.  Sagittal and coronal reformats were performed.    FINDINGS:  LOWER CHEST: Small bilateral pleural effusions with small bibasilar   opacities/atelectasis.    LIVER: New small linear foci of air within the peripheral liver,   suspicious for portal venous gas.  BILE DUCTS: Mildly dilated common bile duct, possibly related to   postcholecystectomy state.  GALLBLADDER: Status post cholecystectomy.  SPLEEN: Within normal limits.  PANCREAS: Within normal limits.  ADRENALS: Unchanged left adrenal gland nodule.  KIDNEYS/URETERS: Diminutive left kidney. A punctate right renal lower   pole calculus is noted. No right-sided hydronephrosis.  BLADDER: Thick-walled urinary bladder containing multiple foci of air.  REPRODUCTIVE ORGANS: Coarse uterine calcification, likely reflecting   calcified uterine fibroid.  BOWEL/PERITONEUM/RETROPERITONEUM: Severe gastric distention extending to   the level of the first/second portion of duodenum, with new pneumatosis   along the posterior gastric wall, suspicious for ischemia, with multiple   adjacent foci of free intraperitoneal air (3/138-302.)  VESSELS: Atherosclerotic vascular calcification.  LYMPH NODES: No enlarged abdominal or pelvic lymph nodes.  BONES: Degenerative changes of the spine.      IMPRESSION:    1. Severe gastric distention extending to the level of the first/second   portion of duodenum, with new pneumatosis along the posterior gastric   wall, suspicious for ischemia; multiple adjacent foci of free   intraperitoneal air are noted.  2. New small linear foci of air within the peripheral liver, suspicious   for portal venous gas.  3. Thick-walled urinary bladder containing multiple foci of air; findings   may be related to recent intervention and/or underlying cystitis, however   however please note colovesicular fistula can have this appearance.   Clinical correlation is suggested.  4. Small bilateral pleural effusions with small bibasilar   opacities/atelectasis.      Spoke with DAVEY ALLEN DO; Resident Arianna on 11/3/2024 12:44 PM with   readback.    --- End of Report ---            JET WALDEN MD; Attending Radiologist  This document has been electronically signed. Nov  3 2024  1:16PM (11-03-24 @ 11:37)      CARDIOLOGY TESTING      MEDICATIONS  chlorhexidine 2% Cloths 1 Topical <User Schedule>  fluconAZOLE IVPB 200 IV Intermittent every 24 hours  lactated ringers Bolus 500 IV Bolus once  lactated ringers. 1000 IV Continuous <Continuous>  meropenem  IVPB     meropenem  IVPB 500 IV Intermittent every 12 hours  norepinephrine Infusion 0.05 IV Continuous <Continuous>  pantoprazole  Injectable 40 IV Push daily      WEIGHT  Weight (kg): 91.6 (11-05-24 @ 01:00)  Creatinine: 3.1 mg/dL (11-05-24 @ 05:15)      ANTIBIOTICS:  fluconAZOLE IVPB 200 milliGRAM(s) IV Intermittent every 24 hours  meropenem  IVPB      meropenem  IVPB 500 milliGRAM(s) IV Intermittent every 12 hours      All available historical records have been reviewed       PATRICIA SPRAGUE  73y, Female  Allergy: No Known Allergies      LOS  2d    CHIEF COMPLAINT: septic shock (04 Nov 2024 14:32)      INTERVAL EVENTS/HPI  - Febrile overnight     11/3 BCX NGTD x2  - T(F): , Max: 101.4 (11-05-24 @ 01:15)  - Tolerating medication  - WBC Count: 19.43 (11-05-24 @ 05:15)  WBC Count: 18.81 (11-04-24 @ 07:19)  - Creatinine: 3.1 (11-05-24 @ 05:15)  Creatinine: 4.2 (11-04-24 @ 07:19)       ROS  General: Denies rigors, nightsweats  HEENT: Denies headache, rhinorrhea, sore throat, eye pain  CV: Denies CP, palpitations  PULM: Denies wheezing, hemoptysis  GI: Denies hematemesis, hematochezia, melena  : Denies discharge, hematuria  MSK: Denies arthralgias, myalgias  SKIN: Denies rash, lesions  NEURO: Denies paresthesias, weakness  PSYCH: Denies depression, anxiety     VITALS:  T(F): 99.2, Max: 101.4 (11-05-24 @ 01:15)  HR: 94  BP: 156/69  RR: 36Vital Signs Last 24 Hrs  T(C): 37.3 (05 Nov 2024 08:00), Max: 38.6 (05 Nov 2024 01:15)  T(F): 99.2 (05 Nov 2024 08:00), Max: 101.4 (05 Nov 2024 01:15)  HR: 94 (05 Nov 2024 09:00) (88 - 117)  BP: 156/69 (05 Nov 2024 09:00) (75/40 - 156/69)  BP(mean): 99 (05 Nov 2024 09:00) (55 - 99)  RR: 36 (05 Nov 2024 09:00) (18 - 43)  SpO2: 96% (05 Nov 2024 09:00) (91% - 100%)    Parameters below as of 05 Nov 2024 09:00  Patient On (Oxygen Delivery Method): nasal cannula, high flow  O2 Flow (L/min): 60  O2 Concentration (%): 60    PHYSICAL EXAM:  Gen: NAD, resting in bed obese HFNC  HEENT: Normocephalic, atraumatic  Neck: supple, no lymphadenopathy  CV: Regular rate & regular rhythm  Lungs: decreased BS at bases, no fremitus  Abdomen: Soft, BS present, nontender   Ext: Warm, well perfused  Neuro: non focal, awake  Skin: no rash, no erythema  Lines: no phlebitis     FH: Non-contributory  Social Hx: Non-contributory    TESTS & MEASUREMENTS:                        10.2   19.43 )-----------( 189      ( 05 Nov 2024 05:15 )             30.1     11-05    144  |  107  |  98[HH]  ----------------------------<  91  3.4[L]   |  21  |  3.1[H]    Ca    10.4      05 Nov 2024 05:15  Mg     1.9     11-05    TPro  4.7[L]  /  Alb  2.9[L]  /  TBili  1.3[H]  /  DBili  x   /  AST  14  /  ALT  33  /  AlkPhos  125[H]  11-05      LIVER FUNCTIONS - ( 05 Nov 2024 05:15 )  Alb: 2.9 g/dL / Pro: 4.7 g/dL / ALK PHOS: 125 U/L / ALT: 33 U/L / AST: 14 U/L / GGT: x           Urinalysis Basic - ( 05 Nov 2024 05:15 )    Color: x / Appearance: x / SG: x / pH: x  Gluc: 91 mg/dL / Ketone: x  / Bili: x / Urobili: x   Blood: x / Protein: x / Nitrite: x   Leuk Esterase: x / RBC: x / WBC x   Sq Epi: x / Non Sq Epi: x / Bacteria: x        Urinalysis with Rflx Culture (collected 11-03-24 @ 13:51)    Culture - Stool (collected 11-03-24 @ 13:51)  Source: .Stool  Preliminary Report (11-04-24 @ 21:38):    No enteric pathogens to date: Final culture pending    Culture - Blood (collected 11-03-24 @ 11:10)  Source: .Blood BLOOD  Preliminary Report (11-04-24 @ 18:01):    No growth at 24 hours    Culture - Blood (collected 11-03-24 @ 11:10)  Source: .Blood BLOOD  Preliminary Report (11-04-24 @ 18:01):    No growth at 24 hours        Lactate, Blood: 1.5 mmol/L (11-04-24 @ 16:58)  Lactate, Blood: 3.2 mmol/L (11-04-24 @ 07:19)  Lactate, Blood: 2.9 mmol/L (11-03-24 @ 22:37)  Blood Gas Venous - Lactate: 2.3 mmol/L (11-03-24 @ 19:09)  Lactate, Blood: 2.5 mmol/L (11-03-24 @ 15:33)  Blood Gas Venous - Lactate: 2.0 mmol/L (11-03-24 @ 11:54)  Lactate, Blood: 2.2 mmol/L (11-03-24 @ 11:10)      INFECTIOUS DISEASES TESTING  Vancomycin Level, Random: 7.4 (11-05-24 @ 05:15)  MRSA PCR Result.: Negative (11-04-24 @ 13:15)  Vancomycin Level, Random: 8.2 (11-04-24 @ 07:19)  strept    INFLAMMATORY MARKERS      RADIOLOGY & ADDITIONAL TESTS:  I have personally reviewed the last available Chest xray  CXR  Xray Chest 1 View- PORTABLE-Urgent:   ACC: [id number] EXAM:  XR CHEST PORTABLE URGENT 1V   ORDERED BY: ALTA SOTO     PROCEDURE DATE:  11/03/2024          INTERPRETATION:  Clinical History / Reason for exam: Enteric tube   placement.    Comparison : Chest radiograph 11/3/2024.    Technique/Positioning: Single frontal chest x-ray obtained.    Findings:    Support devices: Enteric tube extends below diaphragm..    Cardiac/mediastinum/hilum: Unchanged.    Lung parenchyma/Pleura: Unchanged bibasilar opacities.    Skeleton/soft tissues: Unchanged.    Impression:    Enteric tube extends below diaphragm..    --- End of Report ---            JAY ROBLES MD; Attending Radiologist  This document has been electronically signed. Nov  3 2024  8:56PM (11-03-24 @ 17:18)      CT  CT Abdomen and Pelvis No Cont:   ACC: [id number] EXAM:  CT ABDOMEN AND PELVIS   ORDERED BY: DAVEY ALLEN     PROCEDURE DATE:  11/03/2024          INTERPRETATION:  CLINICAL INFORMATION: Abdominal pain.    COMPARISON: January 31, 2023.    CONTRAST/COMPLICATIONS:  IV Contrast: NONE  Oral Contrast: NONE  Complications: None reported at time of study completion    PROCEDURE:  CT of the Abdomen and Pelvis was performed.  Sagittal and coronal reformats were performed.    FINDINGS:  LOWER CHEST: Small bilateral pleural effusions with small bibasilar   opacities/atelectasis.    LIVER: New small linear foci of air within the peripheral liver,   suspicious for portal venous gas.  BILE DUCTS: Mildly dilated common bile duct, possibly related to   postcholecystectomy state.  GALLBLADDER: Status post cholecystectomy.  SPLEEN: Within normal limits.  PANCREAS: Within normal limits.  ADRENALS: Unchanged left adrenal gland nodule.  KIDNEYS/URETERS: Diminutive left kidney. A punctate right renal lower   pole calculus is noted. No right-sided hydronephrosis.  BLADDER: Thick-walled urinary bladder containing multiple foci of air.  REPRODUCTIVE ORGANS: Coarse uterine calcification, likely reflecting   calcified uterine fibroid.  BOWEL/PERITONEUM/RETROPERITONEUM: Severe gastric distention extending to   the level of the first/second portion of duodenum, with new pneumatosis   along the posterior gastric wall, suspicious for ischemia, with multiple   adjacent foci of free intraperitoneal air (3/138-302.)  VESSELS: Atherosclerotic vascular calcification.  LYMPH NODES: No enlarged abdominal or pelvic lymph nodes.  BONES: Degenerative changes of the spine.      IMPRESSION:    1. Severe gastric distention extending to the level of the first/second   portion of duodenum, with new pneumatosis along the posterior gastric   wall, suspicious for ischemia; multiple adjacent foci of free   intraperitoneal air are noted.  2. New small linear foci of air within the peripheral liver, suspicious   for portal venous gas.  3. Thick-walled urinary bladder containing multiple foci of air; findings   may be related to recent intervention and/or underlying cystitis, however   however please note colovesicular fistula can have this appearance.   Clinical correlation is suggested.  4. Small bilateral pleural effusions with small bibasilar   opacities/atelectasis.      Spoke with DAVEY ALLEN DO; Resident Arianna on 11/3/2024 12:44 PM with   readback.    --- End of Report ---            JET WALDEN MD; Attending Radiologist  This document has been electronically signed. Nov  3 2024  1:16PM (11-03-24 @ 11:37)      CARDIOLOGY TESTING      MEDICATIONS  chlorhexidine 2% Cloths 1 Topical <User Schedule>  fluconAZOLE IVPB 200 IV Intermittent every 24 hours  lactated ringers Bolus 500 IV Bolus once  lactated ringers. 1000 IV Continuous <Continuous>  meropenem  IVPB     meropenem  IVPB 500 IV Intermittent every 12 hours  norepinephrine Infusion 0.05 IV Continuous <Continuous>  pantoprazole  Injectable 40 IV Push daily      WEIGHT  Weight (kg): 91.6 (11-05-24 @ 01:00)  Creatinine: 3.1 mg/dL (11-05-24 @ 05:15)      ANTIBIOTICS:  fluconAZOLE IVPB 200 milliGRAM(s) IV Intermittent every 24 hours  meropenem  IVPB      meropenem  IVPB 500 milliGRAM(s) IV Intermittent every 12 hours      All available historical records have been reviewed

## 2024-11-05 NOTE — PROGRESS NOTE ADULT - ASSESSMENT
ASSESSMENT:  73-year-old female PMH A-fib on xarelto,  HTN, s/p cholecystectomy, congenital solitary kidney presents from NH to the ED for evaluation of weakness, decreased PO intake and abdominal distension. Found to have severe gastric distension with emphysematous changes, NG tube placed in ED with 3.1L initial output. Admitted to medical services for workup, actively resuscitated and decompressed via NG tube. Surgery consulted for gastric distension and air within gastric wall      PLAN:  - c/w NGT in place to suction. Monitor output   - Patient on heparin drip, monitor PTT  - Maintain MAP >65  - Encourage ambulation as tolerated  - DVT and GI Prophylaxis  - f/u GI for EGD when medically optimized  - patient may benefit from PEG as could have element of chronic gastroparesis, will follow

## 2024-11-06 LAB
ALBUMIN SERPL ELPH-MCNC: 2.6 G/DL — LOW (ref 3.5–5.2)
ALBUMIN SERPL ELPH-MCNC: 2.8 G/DL — LOW (ref 3.5–5.2)
ALP SERPL-CCNC: 102 U/L — SIGNIFICANT CHANGE UP (ref 30–115)
ALP SERPL-CCNC: 126 U/L — HIGH (ref 30–115)
ALT FLD-CCNC: 18 U/L — SIGNIFICANT CHANGE UP (ref 0–41)
ALT FLD-CCNC: 23 U/L — SIGNIFICANT CHANGE UP (ref 0–41)
ANION GAP SERPL CALC-SCNC: 15 MMOL/L — HIGH (ref 7–14)
ANION GAP SERPL CALC-SCNC: 7 MMOL/L — SIGNIFICANT CHANGE UP (ref 7–14)
ANION GAP SERPL CALC-SCNC: 8 MMOL/L — SIGNIFICANT CHANGE UP (ref 7–14)
APTT BLD: 29 SEC — SIGNIFICANT CHANGE UP (ref 27–39.2)
AST SERPL-CCNC: 11 U/L — SIGNIFICANT CHANGE UP (ref 0–41)
AST SERPL-CCNC: 8 U/L — SIGNIFICANT CHANGE UP (ref 0–41)
BASE EXCESS BLDA CALC-SCNC: 1.6 MMOL/L — SIGNIFICANT CHANGE UP (ref -2–3)
BASOPHILS # BLD AUTO: 0.03 K/UL — SIGNIFICANT CHANGE UP (ref 0–0.2)
BASOPHILS # BLD AUTO: 0.06 K/UL — SIGNIFICANT CHANGE UP (ref 0–0.2)
BASOPHILS NFR BLD AUTO: 0.2 % — SIGNIFICANT CHANGE UP (ref 0–1)
BASOPHILS NFR BLD AUTO: 0.3 % — SIGNIFICANT CHANGE UP (ref 0–1)
BILIRUB SERPL-MCNC: 0.8 MG/DL — SIGNIFICANT CHANGE UP (ref 0.2–1.2)
BILIRUB SERPL-MCNC: 0.9 MG/DL — SIGNIFICANT CHANGE UP (ref 0.2–1.2)
BUN SERPL-MCNC: 36 MG/DL — HIGH (ref 10–20)
BUN SERPL-MCNC: 52 MG/DL — HIGH (ref 10–20)
BUN SERPL-MCNC: 63 MG/DL — CRITICAL HIGH (ref 10–20)
CALCIUM SERPL-MCNC: 11.6 MG/DL — HIGH (ref 8.4–10.5)
CALCIUM SERPL-MCNC: 12.4 MG/DL — HIGH (ref 8.4–10.5)
CALCIUM SERPL-MCNC: 6.6 MG/DL — LOW (ref 8.4–10.5)
CHLORIDE SERPL-SCNC: 113 MMOL/L — HIGH (ref 98–110)
CHLORIDE SERPL-SCNC: 119 MMOL/L — HIGH (ref 98–110)
CHLORIDE SERPL-SCNC: 127 MMOL/L — HIGH (ref 98–110)
CO2 SERPL-SCNC: 15 MMOL/L — LOW (ref 17–32)
CO2 SERPL-SCNC: 24 MMOL/L — SIGNIFICANT CHANGE UP (ref 17–32)
CO2 SERPL-SCNC: 28 MMOL/L — SIGNIFICANT CHANGE UP (ref 17–32)
CREAT SERPL-MCNC: 0.8 MG/DL — SIGNIFICANT CHANGE UP (ref 0.7–1.5)
CREAT SERPL-MCNC: 1.2 MG/DL — SIGNIFICANT CHANGE UP (ref 0.7–1.5)
CREAT SERPL-MCNC: 1.8 MG/DL — HIGH (ref 0.7–1.5)
EGFR: 30 ML/MIN/1.73M2 — LOW
EGFR: 48 ML/MIN/1.73M2 — LOW
EGFR: 79 ML/MIN/1.73M2 — SIGNIFICANT CHANGE UP
EOSINOPHIL # BLD AUTO: 0.05 K/UL — SIGNIFICANT CHANGE UP (ref 0–0.7)
EOSINOPHIL # BLD AUTO: 0.08 K/UL — SIGNIFICANT CHANGE UP (ref 0–0.7)
EOSINOPHIL NFR BLD AUTO: 0.2 % — SIGNIFICANT CHANGE UP (ref 0–8)
EOSINOPHIL NFR BLD AUTO: 0.7 % — SIGNIFICANT CHANGE UP (ref 0–8)
GAS PNL BLDA: SIGNIFICANT CHANGE UP
GLUCOSE SERPL-MCNC: 101 MG/DL — HIGH (ref 70–99)
GLUCOSE SERPL-MCNC: 60 MG/DL — LOW (ref 70–99)
GLUCOSE SERPL-MCNC: 91 MG/DL — SIGNIFICANT CHANGE UP (ref 70–99)
HCO3 BLDA-SCNC: 26 MMOL/L — SIGNIFICANT CHANGE UP (ref 21–28)
HCT VFR BLD CALC: 25.8 % — LOW (ref 37–47)
HCT VFR BLD CALC: 27.3 % — LOW (ref 37–47)
HGB BLD-MCNC: 8.3 G/DL — LOW (ref 12–16)
HGB BLD-MCNC: 9.1 G/DL — LOW (ref 12–16)
HOROWITZ INDEX BLDA+IHG-RTO: 100 — SIGNIFICANT CHANGE UP
IMM GRANULOCYTES NFR BLD AUTO: 1.1 % — HIGH (ref 0.1–0.3)
IMM GRANULOCYTES NFR BLD AUTO: 1.3 % — HIGH (ref 0.1–0.3)
INR BLD: 1.04 RATIO — SIGNIFICANT CHANGE UP (ref 0.65–1.3)
LYMPHOCYTES # BLD AUTO: 0.97 K/UL — LOW (ref 1.2–3.4)
LYMPHOCYTES # BLD AUTO: 1.11 K/UL — LOW (ref 1.2–3.4)
LYMPHOCYTES # BLD AUTO: 4.8 % — LOW (ref 20.5–51.1)
LYMPHOCYTES # BLD AUTO: 7.9 % — LOW (ref 20.5–51.1)
MAGNESIUM SERPL-MCNC: 1.6 MG/DL — LOW (ref 1.8–2.4)
MAGNESIUM SERPL-MCNC: 1.8 MG/DL — SIGNIFICANT CHANGE UP (ref 1.8–2.4)
MCHC RBC-ENTMCNC: 29.6 PG — SIGNIFICANT CHANGE UP (ref 27–31)
MCHC RBC-ENTMCNC: 29.9 PG — SIGNIFICANT CHANGE UP (ref 27–31)
MCHC RBC-ENTMCNC: 32.2 G/DL — SIGNIFICANT CHANGE UP (ref 32–37)
MCHC RBC-ENTMCNC: 33.3 G/DL — SIGNIFICANT CHANGE UP (ref 32–37)
MCV RBC AUTO: 89.8 FL — SIGNIFICANT CHANGE UP (ref 81–99)
MCV RBC AUTO: 92.1 FL — SIGNIFICANT CHANGE UP (ref 81–99)
MONOCYTES # BLD AUTO: 1.15 K/UL — HIGH (ref 0.1–0.6)
MONOCYTES # BLD AUTO: 1.87 K/UL — HIGH (ref 0.1–0.6)
MONOCYTES NFR BLD AUTO: 8.1 % — SIGNIFICANT CHANGE UP (ref 1.7–9.3)
MONOCYTES NFR BLD AUTO: 9.4 % — HIGH (ref 1.7–9.3)
NEUTROPHILS # BLD AUTO: 19.79 K/UL — HIGH (ref 1.4–6.5)
NEUTROPHILS # BLD AUTO: 9.86 K/UL — HIGH (ref 1.4–6.5)
NEUTROPHILS NFR BLD AUTO: 80.7 % — HIGH (ref 42.2–75.2)
NEUTROPHILS NFR BLD AUTO: 85.3 % — HIGH (ref 42.2–75.2)
NRBC # BLD: 0 /100 WBCS — SIGNIFICANT CHANGE UP (ref 0–0)
NRBC # BLD: 0 /100 WBCS — SIGNIFICANT CHANGE UP (ref 0–0)
PCO2 BLDA: 38 MMHG — SIGNIFICANT CHANGE UP (ref 32–45)
PH BLDA: 7.44 — SIGNIFICANT CHANGE UP (ref 7.35–7.45)
PLATELET # BLD AUTO: 139 K/UL — SIGNIFICANT CHANGE UP (ref 130–400)
PLATELET # BLD AUTO: 181 K/UL — SIGNIFICANT CHANGE UP (ref 130–400)
PMV BLD: 12.1 FL — HIGH (ref 7.4–10.4)
PMV BLD: 12.7 FL — HIGH (ref 7.4–10.4)
PO2 BLDA: 195 MMHG — HIGH (ref 83–108)
POTASSIUM SERPL-MCNC: 2.1 MMOL/L — CRITICAL LOW (ref 3.5–5)
POTASSIUM SERPL-MCNC: 3.3 MMOL/L — LOW (ref 3.5–5)
POTASSIUM SERPL-MCNC: 4.3 MMOL/L — SIGNIFICANT CHANGE UP (ref 3.5–5)
POTASSIUM SERPL-SCNC: 2.1 MMOL/L — CRITICAL LOW (ref 3.5–5)
POTASSIUM SERPL-SCNC: 3.3 MMOL/L — LOW (ref 3.5–5)
POTASSIUM SERPL-SCNC: 4.3 MMOL/L — SIGNIFICANT CHANGE UP (ref 3.5–5)
PROCALCITONIN SERPL-MCNC: 5.22 NG/ML — HIGH (ref 0.02–0.1)
PROT SERPL-MCNC: 4.6 G/DL — LOW (ref 6–8)
PROT SERPL-MCNC: 4.8 G/DL — LOW (ref 6–8)
PROTHROM AB SERPL-ACNC: 12.3 SEC — SIGNIFICANT CHANGE UP (ref 9.95–12.87)
RBC # BLD: 2.8 M/UL — LOW (ref 4.2–5.4)
RBC # BLD: 3.04 M/UL — LOW (ref 4.2–5.4)
RBC # FLD: 13.7 % — SIGNIFICANT CHANGE UP (ref 11.5–14.5)
RBC # FLD: 13.8 % — SIGNIFICANT CHANGE UP (ref 11.5–14.5)
SAO2 % BLDA: 96.3 % — SIGNIFICANT CHANGE UP (ref 94–98)
SODIUM SERPL-SCNC: 150 MMOL/L — HIGH (ref 135–146)
SODIUM SERPL-SCNC: 152 MMOL/L — HIGH (ref 135–146)
SODIUM SERPL-SCNC: 154 MMOL/L — HIGH (ref 135–146)
WBC # BLD: 12.22 K/UL — HIGH (ref 4.8–10.8)
WBC # BLD: 23.17 K/UL — HIGH (ref 4.8–10.8)
WBC # FLD AUTO: 12.22 K/UL — HIGH (ref 4.8–10.8)
WBC # FLD AUTO: 23.17 K/UL — HIGH (ref 4.8–10.8)

## 2024-11-06 PROCEDURE — 99291 CRITICAL CARE FIRST HOUR: CPT

## 2024-11-06 PROCEDURE — 99232 SBSQ HOSP IP/OBS MODERATE 35: CPT

## 2024-11-06 PROCEDURE — 71045 X-RAY EXAM CHEST 1 VIEW: CPT | Mod: 26,76

## 2024-11-06 PROCEDURE — 74018 RADEX ABDOMEN 1 VIEW: CPT | Mod: 26

## 2024-11-06 PROCEDURE — 93010 ELECTROCARDIOGRAM REPORT: CPT

## 2024-11-06 RX ORDER — CHLORHEXIDINE GLUCONATE 1.2 MG/ML
15 RINSE ORAL EVERY 12 HOURS
Refills: 0 | Status: DISCONTINUED | OUTPATIENT
Start: 2024-11-06 | End: 2024-11-12

## 2024-11-06 RX ORDER — DEXMEDETOMIDINE HYDROCHLORIDE 4 UG/ML
0.05 INJECTION, SOLUTION INTRAVENOUS
Qty: 400 | Refills: 0 | Status: DISCONTINUED | OUTPATIENT
Start: 2024-11-06 | End: 2024-11-08

## 2024-11-06 RX ORDER — ACETAMINOPHEN 500MG 500 MG/1
1000 TABLET, COATED ORAL ONCE
Refills: 0 | Status: COMPLETED | OUTPATIENT
Start: 2024-11-06 | End: 2024-11-06

## 2024-11-06 RX ORDER — POTASSIUM CHLORIDE 600 MG/1
20 TABLET, EXTENDED RELEASE ORAL ONCE
Refills: 0 | Status: DISCONTINUED | OUTPATIENT
Start: 2024-11-06 | End: 2024-11-06

## 2024-11-06 RX ORDER — FENTANYL 12 UG/H
0.5 PATCH, EXTENDED RELEASE TRANSDERMAL
Qty: 2500 | Refills: 0 | Status: DISCONTINUED | OUTPATIENT
Start: 2024-11-06 | End: 2024-11-08

## 2024-11-06 RX ORDER — POTASSIUM CHLORIDE 600 MG/1
20 TABLET, EXTENDED RELEASE ORAL
Refills: 0 | Status: COMPLETED | OUTPATIENT
Start: 2024-11-06 | End: 2024-11-06

## 2024-11-06 RX ORDER — 0.9 % SODIUM CHLORIDE 0.9 %
1000 INTRAVENOUS SOLUTION INTRAVENOUS
Refills: 0 | Status: DISCONTINUED | OUTPATIENT
Start: 2024-11-06 | End: 2024-11-07

## 2024-11-06 RX ORDER — LORAZEPAM 2 MG/1
2 TABLET ORAL
Refills: 0 | Status: DISCONTINUED | OUTPATIENT
Start: 2024-11-06 | End: 2024-11-06

## 2024-11-06 RX ADMIN — FLUCONAZOLE 100 MILLIGRAM(S): 200 TABLET ORAL at 01:59

## 2024-11-06 RX ADMIN — CHLORHEXIDINE GLUCONATE 15 MILLILITER(S): 1.2 RINSE ORAL at 17:35

## 2024-11-06 RX ADMIN — ACETAMINOPHEN 500MG 400 MILLIGRAM(S): 500 TABLET, COATED ORAL at 01:59

## 2024-11-06 RX ADMIN — MEROPENEM 100 MILLIGRAM(S): 500 INJECTION, POWDER, FOR SOLUTION INTRAVENOUS at 17:35

## 2024-11-06 RX ADMIN — ACETAMINOPHEN 500MG 400 MILLIGRAM(S): 500 TABLET, COATED ORAL at 21:32

## 2024-11-06 RX ADMIN — MEROPENEM 100 MILLIGRAM(S): 500 INJECTION, POWDER, FOR SOLUTION INTRAVENOUS at 05:25

## 2024-11-06 RX ADMIN — CHLORHEXIDINE GLUCONATE 1 APPLICATION(S): 1.2 RINSE ORAL at 05:25

## 2024-11-06 RX ADMIN — Medication 100 MILLILITER(S): at 17:35

## 2024-11-06 RX ADMIN — FENTANYL 4.58 MICROGRAM(S)/KG/HR: 12 PATCH, EXTENDED RELEASE TRANSDERMAL at 17:36

## 2024-11-06 RX ADMIN — FENTANYL 4.58 MICROGRAM(S)/KG/HR: 12 PATCH, EXTENDED RELEASE TRANSDERMAL at 11:30

## 2024-11-06 RX ADMIN — DEXMEDETOMIDINE HYDROCHLORIDE 1.15 MICROGRAM(S)/KG/HR: 4 INJECTION, SOLUTION INTRAVENOUS at 11:30

## 2024-11-06 RX ADMIN — ACETAMINOPHEN 500MG 1000 MILLIGRAM(S): 500 TABLET, COATED ORAL at 02:14

## 2024-11-06 RX ADMIN — PANTOPRAZOLE SODIUM 40 MILLIGRAM(S): 40 TABLET, DELAYED RELEASE ORAL at 11:31

## 2024-11-06 RX ADMIN — POTASSIUM CHLORIDE 100 MILLIEQUIVALENT(S): 600 TABLET, EXTENDED RELEASE ORAL at 11:31

## 2024-11-06 RX ADMIN — POTASSIUM CHLORIDE 100 MILLIEQUIVALENT(S): 600 TABLET, EXTENDED RELEASE ORAL at 12:52

## 2024-11-06 RX ADMIN — POTASSIUM CHLORIDE 100 MILLIEQUIVALENT(S): 600 TABLET, EXTENDED RELEASE ORAL at 15:42

## 2024-11-06 RX ADMIN — DEXMEDETOMIDINE HYDROCHLORIDE 1.15 MICROGRAM(S)/KG/HR: 4 INJECTION, SOLUTION INTRAVENOUS at 17:35

## 2024-11-06 RX ADMIN — NOREPINEPHRINE BITARTRATE 8.48 MICROGRAM(S)/KG/MIN: 1 INJECTION, SOLUTION, CONCENTRATE INTRAVENOUS at 11:30

## 2024-11-06 RX ADMIN — Medication 100 MILLILITER(S): at 11:30

## 2024-11-06 NOTE — PROGRESS NOTE ADULT - SUBJECTIVE AND OBJECTIVE BOX
Patient is a 73y old  Female who presents with a chief complaint of septic shock (04 Nov 2024 14:32)        Over Night Events:  Worsening respiratory status.  on NIV this am.          ROS:     All ROS are negative except HPI         PHYSICAL EXAM    ICU Vital Signs Last 24 Hrs  T(C): 37.3 (06 Nov 2024 08:00), Max: 37.9 (06 Nov 2024 01:30)  T(F): 99.1 (06 Nov 2024 08:00), Max: 100.3 (06 Nov 2024 01:30)  HR: 99 (06 Nov 2024 08:00) (86 - 104)  BP: 131/60 (06 Nov 2024 08:00) (88/45 - 159/67)  BP(mean): 87 (06 Nov 2024 08:00) (53 - 99)  ABP: --  ABP(mean): --  RR: 30 (06 Nov 2024 08:00) (15 - 54)  SpO2: 96% (06 Nov 2024 08:00) (78% - 100%)    O2 Parameters below as of 06 Nov 2024 08:00  Patient On (Oxygen Delivery Method): BiPAP/CPAP    O2 Concentration (%): 60        CONSTITUTIONAL:  Ill appearing in moderate distress     ENT:   Airway patent,   Mouth with normal mucosa.       EYES:   Pupils equal,   Round and reactive to light.    CARDIAC:   Normal rate,   Regular rhythm.    No edema      RESPIRATORY:   No wheezing  Bilateral BS  Normal chest expansion   tachypneic,  use of accessory muscles    GASTROINTESTINAL:  Abdomen soft,   Non-tender,   No guarding,   + BS    MUSCULOSKELETAL:   Range of motion is not limited,  No clubbing, cyanosis    NEUROLOGICAL:   Alert and oriented   No motor  deficits.    SKIN:   Skin normal color for race,   Warm and dry  No evidence of rash.        11-05-24 @ 07:01  -  11-06-24 @ 07:00  --------------------------------------------------------  IN:    IV PiggyBack: 150 mL    IV PiggyBack: 250 mL    Lactated Ringers: 75 mL    Lactated Ringers: 2000 mL    Lactated Ringers Bolus: 500 mL    Norepinephrine: 390.9 mL  Total IN: 3365.9 mL    OUT:    Indwelling Catheter - Urethral (mL): 2775 mL    Nasogastric/Oral tube (mL): 350 mL  Total OUT: 3125 mL    Total NET: 240.9 mL          LABS:                            9.1    23.17 )-----------( 181      ( 06 Nov 2024 06:10 )             27.3                                               11-06    152[H]  |  113[H]  |  63[HH]  ----------------------------<  91  3.3[L]   |  24  |  1.8[H]    Ca    11.6[H]      06 Nov 2024 06:10  Mg     1.8     11-06    TPro  4.8[L]  /  Alb  2.8[L]  /  TBili  0.9  /  DBili  x   /  AST  11  /  ALT  23  /  AlkPhos  126[H]  11-06      PTT - ( 04 Nov 2024 09:21 )  PTT:149.0 sec                                       Urinalysis Basic - ( 06 Nov 2024 06:10 )    Color: x / Appearance: x / SG: x / pH: x  Gluc: 91 mg/dL / Ketone: x  / Bili: x / Urobili: x   Blood: x / Protein: x / Nitrite: x   Leuk Esterase: x / RBC: x / WBC x   Sq Epi: x / Non Sq Epi: x / Bacteria: x                                                  LIVER FUNCTIONS - ( 06 Nov 2024 06:10 )  Alb: 2.8 g/dL / Pro: 4.8 g/dL / ALK PHOS: 126 U/L / ALT: 23 U/L / AST: 11 U/L / GGT: x                                                  Urinalysis with Rflx Culture (collected 03 Nov 2024 13:51)    Culture - Stool (collected 03 Nov 2024 13:51)  Source: .Stool  Final Report (05 Nov 2024 19:02):    No enteric pathogens isolated.    (Stool culture examined for Salmonella,    Shigella, Campylobacter, Aeromonas, Plesiomonas,    Vibrio, E.coli O157 and Yersinia)    Culture - Urine (collected 03 Nov 2024 13:51)  Source: Clean Catch None  Final Report (05 Nov 2024 12:11):    >100,000 CFU/ml Streptococcus gallolyticus "Susceptibilities not    performed"    <10,000 CFU/ml Normal Urogenital rosalva present    Culture - Blood (collected 03 Nov 2024 11:10)  Source: .Blood BLOOD  Preliminary Report (05 Nov 2024 18:01):    No growth at 48 Hours    Culture - Blood (collected 03 Nov 2024 11:10)  Source: .Blood BLOOD  Preliminary Report (05 Nov 2024 18:01):    No growth at 48 Hours                                                                                       ABG - ( 06 Nov 2024 00:29 )  pH, Arterial: 7.43  pH, Blood: x     /  pCO2: 34    /  pO2: 232   / HCO3: 23    / Base Excess: -1.3  /  SaO2: 96.3                MEDICATIONS  (STANDING):  chlorhexidine 2% Cloths 1 Application(s) Topical <User Schedule>  fluconAZOLE IVPB 200 milliGRAM(s) IV Intermittent every 24 hours  lactated ringers. 1000 milliLiter(s) (100 mL/Hr) IV Continuous <Continuous>  meropenem  IVPB      meropenem  IVPB 500 milliGRAM(s) IV Intermittent every 12 hours  norepinephrine Infusion 0.05 MICROgram(s)/kG/Min (8.48 mL/Hr) IV Continuous <Continuous>  pantoprazole  Injectable 40 milliGRAM(s) IV Push daily    MEDICATIONS  (PRN):      New X-rays reviewed:                                                                                  ECHO

## 2024-11-06 NOTE — PROGRESS NOTE ADULT - ASSESSMENT
74 yo F NHR with h/o HTN, congenital solitary kidney, PAF on AC, admitted for weakness and poor oral intake a/w abdominal pain and distension.      # NOLA mostly prerenal   # leukocytosis sepsis / SBO  # Hypotension / shock     # solitary kidney   # hypernatremia  # hypercalcemia     - creat better non oliguric   - cont IVF switch to d5w 1/2 NS at 150 cc per hour   - check PTH vitamin D SPEP UPEP SFLC   - replete K > 3.5   - follow BMP  tonight   - on merren and diflucan dose to GFR of 30 ml/min   - no need for RRT     will follow

## 2024-11-06 NOTE — PROGRESS NOTE ADULT - SUBJECTIVE AND OBJECTIVE BOX
Nephrology progress note    Patient is seen and examined, events over the last 24 h noted .  Lying in bed  on oxygen     Allergies:  No Known Allergies    Hospital Medications:     MEDICATIONS  (STANDING):    fluconAZOLE IVPB 200 milliGRAM(s) IV Intermittent every 24 hours  lactated ringers. 1000 milliLiter(s) (100 mL/Hr) IV Continuous <Continuous>  meropenem  IVPB 500 milliGRAM(s) IV Intermittent every 12 hours  norepinephrine Infusion 0.05 MICROgram(s)/kG/Min (8.48 mL/Hr) IV Continuous <Continuous>  pantoprazole  Injectable 40 milliGRAM(s) IV Push daily        VITALS:  T(F): 99.1 (11-06-24 @ 08:00), Max: 100.3 (11-06-24 @ 01:30)  HR: 93 (11-06-24 @ 08:50)  BP: 131/60 (11-06-24 @ 08:00)  RR: 30 (11-06-24 @ 08:00)  SpO2: 100% (11-06-24 @ 08:50)      11-04 @ 07:01  -  11-05 @ 07:00  --------------------------------------------------------  IN: 433.5 mL / OUT: 1340 mL / NET: -906.5 mL    11-05 @ 07:01  -  11-06 @ 07:00  --------------------------------------------------------  IN: 3365.9 mL / OUT: 3125 mL / NET: 240.9 mL      05 Nov 2024 07:01  -  06 Nov 2024 07:00  --------------------------------------------------------  IN:    IV PiggyBack: 150 mL    IV PiggyBack: 250 mL    Lactated Ringers: 75 mL    Lactated Ringers: 2000 mL    Lactated Ringers Bolus: 500 mL    Norepinephrine: 390.9 mL  Total IN: 3365.9 mL    OUT:    Indwelling Catheter - Urethral (mL): 2775 mL    Nasogastric/Oral tube (mL): 350 mL  Total OUT: 3125 mL    Total NET: 240.9 mL            PHYSICAL EXAM:  Constitutional: NAD  Respiratory: CTAB,  Cardiovascular: S1, S2, RRR  Gastrointestinal: BS+, soft, NT/ND  Extremities: No cyanosis or clubbing. No peripheral edema  Skin: No rashes    LABS:  11-06    152[H]  |  113[H]  |  63[HH]  ----------------------------<  91  3.3[L]   |  24  |  1.8[H]    Ca    11.6[H]      06 Nov 2024 06:10  Mg     1.8     11-06    TPro  4.8[L]  /  Alb  2.8[L]  /  TBili  0.9  /  DBili      /  AST  11  /  ALT  23  /  AlkPhos  126[H]  11-06                          9.1    23.17 )-----------( 181      ( 06 Nov 2024 06:10 )             27.3       Urine Studies:  Urinalysis Basic - ( 06 Nov 2024 06:10 )    Color:  / Appearance:  / SG:  / pH:   Gluc: 91 mg/dL / Ketone:   / Bili:  / Urobili:    Blood:  / Protein:  / Nitrite:    Leuk Esterase:  / RBC:  / WBC    Sq Epi:  / Non Sq Epi:  / Bacteria:       Sodium, Random Urine: 42.0 mmoL/L (11-04 @ 20:45)  Creatinine, Random Urine: 62 mg/dL (11-04 @ 20:45)  Protein/Creatinine Ratio Calculation: 0.4 Ratio (11-04 @ 20:45)  Osmolality, Random Urine: 372 mos/kg (11-04 @ 20:45)  Potassium, Random Urine: 21 mmol/L (11-04 @ 20:45)      HbA1c 5.6      [12-20-19 @ 07:09]          RADIOLOGY & ADDITIONAL STUDIES:

## 2024-11-06 NOTE — PROGRESS NOTE ADULT - SUBJECTIVE AND OBJECTIVE BOX
Gastroenterology progress note:     Patient is a 73y old  Female who presents with a chief complaint of septic shock (04 Nov 2024 14:32)       Admitted on: 11-03-24    We are following the patient for gastric ischemia     intubated in ICU     PAST MEDICAL & SURGICAL HISTORY:  HTN (hypertension)      Diabetes mellitus      Obesity      Gastroesophageal reflux disease      Active asthma      Generalized OA      Afib      H/O hernia repair  2011 and revised in 2013      History of cholecystectomy          MEDICATIONS  (STANDING):  chlorhexidine 0.12% Liquid 15 milliLiter(s) Oral Mucosa every 12 hours  chlorhexidine 2% Cloths 1 Application(s) Topical <User Schedule>  dexMEDEtomidine Infusion 0.05 MICROgram(s)/kG/Hr (1.15 mL/Hr) IV Continuous <Continuous>  fentaNYL   Infusion 0.5 MICROgram(s)/kG/Hr (4.58 mL/Hr) IV Continuous <Continuous>  fluconAZOLE IVPB 200 milliGRAM(s) IV Intermittent every 24 hours  lactated ringers. 1000 milliLiter(s) (100 mL/Hr) IV Continuous <Continuous>  meropenem  IVPB 500 milliGRAM(s) IV Intermittent every 12 hours  meropenem  IVPB      norepinephrine Infusion 0.05 MICROgram(s)/kG/Min (8.48 mL/Hr) IV Continuous <Continuous>  pantoprazole  Injectable 40 milliGRAM(s) IV Push daily    MEDICATIONS  (PRN):      Allergies  No Known Allergies      Review of Systems:   unable to obtain     Physical Examination:  T(C): 37.7 (11-06-24 @ 16:00), Max: 37.9 (11-06-24 @ 01:30)  HR: 76 (11-06-24 @ 17:00) (75 - 104)  BP: 136/63 (11-06-24 @ 16:45) (90/42 - 151/64)  RR: 18 (11-06-24 @ 17:00) (13 - 39)  SpO2: 100% (11-06-24 @ 17:00) (96% - 100%)      11-05-24 @ 07:01  -  11-06-24 @ 07:00  --------------------------------------------------------  IN: 3365.9 mL / OUT: 3125 mL / NET: 240.9 mL    11-06-24 @ 07:01  -  11-06-24 @ 18:05  --------------------------------------------------------  IN: 1868.1 mL / OUT: 2300 mL / NET: -431.9 mL        GENERAL: intubated   HEAD:  Atraumatic, Normocephalic  EYES: conjunctiva and sclera clear  NECK: Supple, no JVD or thyromegaly  CHEST/LUNG: Clear to auscultation bilaterally  HEART: Regular rate and rhythm  ABDOMEN: Soft, nontender, nondistended  NEUROLOGY:sedated   SKIN: Intact, no jaundice     Data:                        9.1    23.17 )-----------( 181      ( 06 Nov 2024 06:10 )             27.3     Hgb trend:  9.1  11-06-24 @ 06:10  10.2  11-05-24 @ 05:15  13.8  11-04-24 @ 07:19  13.9  11-03-24 @ 22:37        11-06    150[H]  |  127[H]  |  36[H]  ----------------------------<  60[L]  2.1[LL]   |  15[L]  |  0.8    Ca    6.6[L]      06 Nov 2024 12:30  Mg     1.8     11-06    TPro  4.8[L]  /  Alb  2.8[L]  /  TBili  0.9  /  DBili  x   /  AST  11  /  ALT  23  /  AlkPhos  126[H]  11-06    Liver panel trend:  TBili 0.9   /   AST 11   /   ALT 23   /   AlkP 126   /   Tptn 4.8   /   Alb 2.8    /   DBili --      11-06  TBili 1.3   /   AST 14   /   ALT 33   /   AlkP 125   /   Tptn 4.7   /   Alb 2.9    /   DBili --      11-05  TBili 1.6   /   AST 39   /   ALT 55   /   AlkP 149   /   Tptn 5.3   /   Alb 3.2    /   DBili --      11-04  TBili 1.7   /   AST 43   /   ALT 51   /   AlkP 141   /   Tptn 5.5   /   Alb 3.4    /   DBili --      11-03  TBili 1.2   /   AST 18   /   ALT 33   /   AlkP 124   /   Tptn 6.1   /   Alb 3.7    /   DBili --      11-03             Radiology:    US Abdomen Upper Quadrant Right:   ACC: [id number] EXAM:  US ABDOMEN RT UPR QUADRANT   ORDERED BY: LUIS FERNANDO FLETCHER     PROCEDURE DATE:  11/05/2024          INTERPRETATION:  CLINICAL INFORMATION: Evaluate for cholecystitis    COMPARISON: CAT scan November 3, 2024    TECHNIQUE: Sonography of the right upper quadrant.    FINDINGS:  Liver: Liver is heterogeneous.  Bile ducts: Within normal limits Common bile duct measures 8 mm.  Gallbladder: Status post cholecystectomy  Pancreas: Obscured  Right kidney: 12.5 cm. No hydronephrosis.  Ascites: None.  IVC: Visualized portions are within normal limits.    IMPRESSION:  Status post cholecystectomy. Heterogeneous poorly visualized liver.   Previously noted portal venous air on prior CAT scan not well appreciated   on this limited exam.        --- End of Report ---            TANJA HU MD; Attending Radiologist  This document has been electronically signed. Nov 5 2024  9:03AM (11-05-24 @ 08:59)

## 2024-11-06 NOTE — GOALS OF CARE CONVERSATION - ADVANCED CARE PLANNING - CONVERSATION DETAILS
Discussed patient's prognosis and diagnosis at length with patient who understands as well as daughter Cecilia. Patient initially not agreeable to intubation due to fear of not being extubated. Explained current diagnosis and need for intubation given tachypnea and in addition need for EGD intervention for possible GOO which cannot be done while patient is NIV dependent. Spoke with patient's daughter Cecilia who is agreeable for intubation and daughter convinced patient to be intubated. Patient wants her daughter Cecilia to be the HCP and make decisions. Patient also does not want to remain intubated if she cannot be extubated and is only agreeing to temporary intubation, which Cecilia understands as well

## 2024-11-06 NOTE — CHART NOTE - NSCHARTNOTEFT_GEN_A_CORE
Once given a break from the BIPAP, and while on HF, she was noted to have sat 70s and tachypneic to 40-50  She was placed back on BIPAP 100%  She was still tachypneic to the 40s -50s  She was Offered ventilatory support which she emphatically declined. At this time, she was mentating well, but more lethargic than her usual.  The risk of failure was explained to her, however she expressed that she wanted a chance to breath before intubating her. Meanwhile family was notified, and the family requested everything be done to keep her alive.  Because she was only tachypneic without any accessory muscle use, she was monitored on NIV, on which she began to show improvement.   We will continue to monitor her closely

## 2024-11-06 NOTE — PROGRESS NOTE ADULT - SUBJECTIVE AND OBJECTIVE BOX
DARCIE PATRICIA  73y, Female  Allergy: No Known Allergies      LOS  3d    CHIEF COMPLAINT: septic shock (04 Nov 2024 14:32)      INTERVAL EVENTS/HPI  - No acute events overnight  - T(F): , Max: 100.3 (11-06-24 @ 01:30)  - Tolerating medication  - WBC Count: 23.17 (11-06-24 @ 06:10) uptrending   WBC Count: 19.43 (11-05-24 @ 05:15)     - Creatinine: 1.8 (11-06-24 @ 06:10) downtrending   Creatinine: 3.1 (11-05-24 @ 05:15)       ROS  ***    VITALS:  T(F): 99.1, Max: 100.3 (11-06-24 @ 01:30)  HR: 98  BP: 137/67  RR: 31Vital Signs Last 24 Hrs  T(C): 37.3 (06 Nov 2024 08:00), Max: 37.9 (06 Nov 2024 01:30)  T(F): 99.1 (06 Nov 2024 08:00), Max: 100.3 (06 Nov 2024 01:30)  HR: 98 (06 Nov 2024 10:00) (86 - 104)  BP: 137/67 (06 Nov 2024 10:00) (104/55 - 159/67)  BP(mean): 96 (06 Nov 2024 10:00) (75 - 98)  RR: 31 (06 Nov 2024 10:00) (15 - 51)  SpO2: 100% (06 Nov 2024 10:00) (78% - 100%)    Parameters below as of 06 Nov 2024 08:00  Patient On (Oxygen Delivery Method): BiPAP/CPAP    O2 Concentration (%): 60    PHYSICAL EXAM:  ***    FH: Non-contributory  Social Hx: Non-contributory    TESTS & MEASUREMENTS:                        9.1    23.17 )-----------( 181      ( 06 Nov 2024 06:10 )             27.3     11-06    152[H]  |  113[H]  |  63[HH]  ----------------------------<  91  3.3[L]   |  24  |  1.8[H]    Ca    11.6[H]      06 Nov 2024 06:10  Mg     1.8     11-06    TPro  4.8[L]  /  Alb  2.8[L]  /  TBili  0.9  /  DBili  x   /  AST  11  /  ALT  23  /  AlkPhos  126[H]  11-06      LIVER FUNCTIONS - ( 06 Nov 2024 06:10 )  Alb: 2.8 g/dL / Pro: 4.8 g/dL / ALK PHOS: 126 U/L / ALT: 23 U/L / AST: 11 U/L / GGT: x           Urinalysis Basic - ( 06 Nov 2024 06:10 )    Color: x / Appearance: x / SG: x / pH: x  Gluc: 91 mg/dL / Ketone: x  / Bili: x / Urobili: x   Blood: x / Protein: x / Nitrite: x   Leuk Esterase: x / RBC: x / WBC x   Sq Epi: x / Non Sq Epi: x / Bacteria: x        Urinalysis with Rflx Culture (collected 11-03-24 @ 13:51)    Culture - Stool (collected 11-03-24 @ 13:51)  Source: .Stool  Final Report (11-05-24 @ 19:02):    No enteric pathogens isolated.    (Stool culture examined for Salmonella,    Shigella, Campylobacter, Aeromonas, Plesiomonas,    Vibrio, E.coli O157 and Yersinia)    Culture - Urine (collected 11-03-24 @ 13:51)  Source: Clean Catch None  Final Report (11-05-24 @ 12:11):    >100,000 CFU/ml Streptococcus gallolyticus "Susceptibilities not    performed"    <10,000 CFU/ml Normal Urogenital rosalva present    Culture - Blood (collected 11-03-24 @ 11:10)  Source: .Blood BLOOD  Preliminary Report (11-05-24 @ 18:01):    No growth at 48 Hours    Culture - Blood (collected 11-03-24 @ 11:10)  Source: .Blood BLOOD  Preliminary Report (11-05-24 @ 18:01):    No growth at 48 Hours        Lactate, Blood: 1.5 mmol/L (11-04-24 @ 16:58)  Lactate, Blood: 3.2 mmol/L (11-04-24 @ 07:19)  Lactate, Blood: 2.9 mmol/L (11-03-24 @ 22:37)  Blood Gas Venous - Lactate: 2.3 mmol/L (11-03-24 @ 19:09)  Lactate, Blood: 2.5 mmol/L (11-03-24 @ 15:33)  Blood Gas Venous - Lactate: 2.0 mmol/L (11-03-24 @ 11:54)  Lactate, Blood: 2.2 mmol/L (11-03-24 @ 11:10)      INFECTIOUS DISEASES TESTING  Vancomycin Level, Random: 7.4 (11-05-24 @ 05:15)  MRSA PCR Result.: Negative (11-04-24 @ 13:15)  Vancomycin Level, Random: 8.2 (11-04-24 @ 07:19)  strept    INFLAMMATORY MARKERS      RADIOLOGY & ADDITIONAL TESTS:  I have personally reviewed the last available Chest xray  CXR  Xray Chest 1 View AP/PA:   ACC: [id number] EXAM:  XR CHEST 1 VIEW   ORDERED BY: NAVIN SALAS     PROCEDURE DATE:  11/05/2024          INTERPRETATION:  CLINICAL HISTORY: o2 desaturation increased o2   requirements.    COMPARISON: November 5, 2024.    TECHNIQUE: Portable frontalchest radiograph. Low lung volume.    FINDINGS:    Support devices: NGT with its tip below the diaphragm.    Cardiac/mediastinum/hilum: Stable.    Lung parenchyma/Pleura: Bibasilar opacities, unchanged. No pneumothorax   is seen.    Skeleton/soft tissues: Stable.      IMPRESSION: Low lung volume.    Bibasilar opacities, unchanged.    NGT.    --- End of Report ---            MARYANN WHITING MD; Attending Radiologist  This document has been electronically signed. Nov 5 2024 11:27PM (11-05-24 @ 22:57)      CT  CT Abdomen and Pelvis No Cont:   ACC: [id number] EXAM:  CT ABDOMEN AND PELVIS   ORDERED BY: DAVEY ALLEN     PROCEDURE DATE:  11/03/2024          INTERPRETATION:  CLINICAL INFORMATION: Abdominal pain.    COMPARISON: January 31, 2023.    CONTRAST/COMPLICATIONS:  IV Contrast: NONE  Oral Contrast: NONE  Complications: None reported at time of study completion    PROCEDURE:  CT of the Abdomen and Pelvis was performed.  Sagittal and coronal reformats were performed.    FINDINGS:  LOWER CHEST: Small bilateral pleural effusions with small bibasilar   opacities/atelectasis.    LIVER: New small linear foci of air within the peripheral liver,   suspicious for portal venous gas.  BILE DUCTS: Mildly dilated common bile duct, possibly related to   postcholecystectomy state.  GALLBLADDER: Status post cholecystectomy.  SPLEEN: Within normal limits.  PANCREAS: Within normal limits.  ADRENALS: Unchanged left adrenal gland nodule.  KIDNEYS/URETERS: Diminutive left kidney. A punctate right renal lower   pole calculus is noted. No right-sided hydronephrosis.  BLADDER: Thick-walled urinary bladder containing multiple foci of air.  REPRODUCTIVE ORGANS: Coarse uterine calcification, likely reflecting   calcified uterine fibroid.  BOWEL/PERITONEUM/RETROPERITONEUM: Severe gastric distention extending to   the level of the first/second portion of duodenum, with new pneumatosis   along the posterior gastric wall, suspicious for ischemia, with multiple   adjacent foci of free intraperitoneal air (3/138-302.)  VESSELS: Atherosclerotic vascular calcification.  LYMPH NODES: No enlarged abdominal or pelvic lymph nodes.  BONES: Degenerative changes of the spine.      IMPRESSION:    1. Severe gastric distention extending to the level of the first/second   portion of duodenum, with new pneumatosis along the posterior gastric   wall, suspicious for ischemia; multiple adjacent foci of free   intraperitoneal air are noted.  2. New small linear foci of air within the peripheral liver, suspicious   for portal venous gas.  3. Thick-walled urinary bladder containing multiple foci of air; findings   may be related to recent intervention and/or underlying cystitis, however   however please note colovesicular fistula can have this appearance.   Clinical correlation is suggested.  4. Small bilateral pleural effusions with small bibasilar   opacities/atelectasis.      Spoke with DAVEY ALLEN DO; Resident Arianna on 11/3/2024 12:44 PM with   readback.    --- End of Report ---            JET WALDEN MD; Attending Radiologist  This document has been electronically signed. Nov  3 2024  1:16PM (11-03-24 @ 11:37)      CARDIOLOGY TESTING      MEDICATIONS  chlorhexidine 2% Cloths 1 Topical <User Schedule>  dexMEDEtomidine Infusion 0.05 IV Continuous <Continuous>  fentaNYL   Infusion 0.5 IV Continuous <Continuous>  fluconAZOLE IVPB 200 IV Intermittent every 24 hours  lactated ringers. 1000 IV Continuous <Continuous>  meropenem  IVPB     meropenem  IVPB 500 IV Intermittent every 12 hours  norepinephrine Infusion 0.05 IV Continuous <Continuous>  pantoprazole  Injectable 40 IV Push daily  potassium chloride  20 mEq/100 mL IVPB 20 IV Intermittent every 2 hours      WEIGHT  Weight (kg): 91.6 (11-05-24 @ 01:00)  Creatinine: 1.8 mg/dL (11-06-24 @ 06:10)      ANTIBIOTICS:  fluconAZOLE IVPB 200 milliGRAM(s) IV Intermittent every 24 hours  meropenem  IVPB      meropenem  IVPB 500 milliGRAM(s) IV Intermittent every 12 hours      All available historical records have been reviewed       DARCIE PATRICIA  73y, Female  Allergy: No Known Allergies      LOS  3d    CHIEF COMPLAINT: septic shock (04 Nov 2024 14:32)      INTERVAL EVENTS/HPI  - intubated, pressors  - T(F): , Max: 100.3 (11-06-24 @ 01:30)  - Tolerating medication  - WBC Count: 23.17 (11-06-24 @ 06:10) uptrending   WBC Count: 19.43 (11-05-24 @ 05:15)     - Creatinine: 1.8 (11-06-24 @ 06:10) downtrending   Creatinine: 3.1 (11-05-24 @ 05:15)       ROS  unable to obtain history secondary to patient's mental status and/or sedation     VITALS:  T(F): 99.1, Max: 100.3 (11-06-24 @ 01:30)  HR: 98  BP: 137/67  RR: 31Vital Signs Last 24 Hrs  T(C): 37.3 (06 Nov 2024 08:00), Max: 37.9 (06 Nov 2024 01:30)  T(F): 99.1 (06 Nov 2024 08:00), Max: 100.3 (06 Nov 2024 01:30)  HR: 98 (06 Nov 2024 10:00) (86 - 104)  BP: 137/67 (06 Nov 2024 10:00) (104/55 - 159/67)  BP(mean): 96 (06 Nov 2024 10:00) (75 - 98)  RR: 31 (06 Nov 2024 10:00) (15 - 51)  SpO2: 100% (06 Nov 2024 10:00) (78% - 100%)    Parameters below as of 06 Nov 2024 08:00  Patient On (Oxygen Delivery Method): BiPAP/CPAP    O2 Concentration (%): 60    PHYSICAL EXAM:  General: intubated obese   HEENT: NCAT NGT dark output  Neck: supple  CV: RRR  Lungs: symmetric chest expansion, decreased BS at bases  Abd: Soft  Extr: wwp  Skin: no rash  Neuro: sedated  Lines: no phlebitis     FH: Non-contributory  Social Hx: Non-contributory    TESTS & MEASUREMENTS:                        9.1    23.17 )-----------( 181      ( 06 Nov 2024 06:10 )             27.3     11-06    152[H]  |  113[H]  |  63[HH]  ----------------------------<  91  3.3[L]   |  24  |  1.8[H]    Ca    11.6[H]      06 Nov 2024 06:10  Mg     1.8     11-06    TPro  4.8[L]  /  Alb  2.8[L]  /  TBili  0.9  /  DBili  x   /  AST  11  /  ALT  23  /  AlkPhos  126[H]  11-06      LIVER FUNCTIONS - ( 06 Nov 2024 06:10 )  Alb: 2.8 g/dL / Pro: 4.8 g/dL / ALK PHOS: 126 U/L / ALT: 23 U/L / AST: 11 U/L / GGT: x           Urinalysis Basic - ( 06 Nov 2024 06:10 )    Color: x / Appearance: x / SG: x / pH: x  Gluc: 91 mg/dL / Ketone: x  / Bili: x / Urobili: x   Blood: x / Protein: x / Nitrite: x   Leuk Esterase: x / RBC: x / WBC x   Sq Epi: x / Non Sq Epi: x / Bacteria: x        Urinalysis with Rflx Culture (collected 11-03-24 @ 13:51)    Culture - Stool (collected 11-03-24 @ 13:51)  Source: .Stool  Final Report (11-05-24 @ 19:02):    No enteric pathogens isolated.    (Stool culture examined for Salmonella,    Shigella, Campylobacter, Aeromonas, Plesiomonas,    Vibrio, E.coli O157 and Yersinia)    Culture - Urine (collected 11-03-24 @ 13:51)  Source: Clean Catch None  Final Report (11-05-24 @ 12:11):    >100,000 CFU/ml Streptococcus gallolyticus "Susceptibilities not    performed"    <10,000 CFU/ml Normal Urogenital rosalva present    Culture - Blood (collected 11-03-24 @ 11:10)  Source: .Blood BLOOD  Preliminary Report (11-05-24 @ 18:01):    No growth at 48 Hours    Culture - Blood (collected 11-03-24 @ 11:10)  Source: .Blood BLOOD  Preliminary Report (11-05-24 @ 18:01):    No growth at 48 Hours        Lactate, Blood: 1.5 mmol/L (11-04-24 @ 16:58)  Lactate, Blood: 3.2 mmol/L (11-04-24 @ 07:19)  Lactate, Blood: 2.9 mmol/L (11-03-24 @ 22:37)  Blood Gas Venous - Lactate: 2.3 mmol/L (11-03-24 @ 19:09)  Lactate, Blood: 2.5 mmol/L (11-03-24 @ 15:33)  Blood Gas Venous - Lactate: 2.0 mmol/L (11-03-24 @ 11:54)  Lactate, Blood: 2.2 mmol/L (11-03-24 @ 11:10)      INFECTIOUS DISEASES TESTING  Vancomycin Level, Random: 7.4 (11-05-24 @ 05:15)  MRSA PCR Result.: Negative (11-04-24 @ 13:15)  Vancomycin Level, Random: 8.2 (11-04-24 @ 07:19)  strept    INFLAMMATORY MARKERS      RADIOLOGY & ADDITIONAL TESTS:  I have personally reviewed the last available Chest xray  CXR  Xray Chest 1 View AP/PA:   ACC: [id number] EXAM:  XR CHEST 1 VIEW   ORDERED BY: NAVIN SALAS     PROCEDURE DATE:  11/05/2024          INTERPRETATION:  CLINICAL HISTORY: o2 desaturation increased o2   requirements.    COMPARISON: November 5, 2024.    TECHNIQUE: Portable frontalchest radiograph. Low lung volume.    FINDINGS:    Support devices: NGT with its tip below the diaphragm.    Cardiac/mediastinum/hilum: Stable.    Lung parenchyma/Pleura: Bibasilar opacities, unchanged. No pneumothorax   is seen.    Skeleton/soft tissues: Stable.      IMPRESSION: Low lung volume.    Bibasilar opacities, unchanged.    NGT.    --- End of Report ---            MARYANN WHITING MD; Attending Radiologist  This document has been electronically signed. Nov 5 2024 11:27PM (11-05-24 @ 22:57)      CT  CT Abdomen and Pelvis No Cont:   ACC: [id number] EXAM:  CT ABDOMEN AND PELVIS   ORDERED BY: DAVEY ALLEN     PROCEDURE DATE:  11/03/2024          INTERPRETATION:  CLINICAL INFORMATION: Abdominal pain.    COMPARISON: January 31, 2023.    CONTRAST/COMPLICATIONS:  IV Contrast: NONE  Oral Contrast: NONE  Complications: None reported at time of study completion    PROCEDURE:  CT of the Abdomen and Pelvis was performed.  Sagittal and coronal reformats were performed.    FINDINGS:  LOWER CHEST: Small bilateral pleural effusions with small bibasilar   opacities/atelectasis.    LIVER: New small linear foci of air within the peripheral liver,   suspicious for portal venous gas.  BILE DUCTS: Mildly dilated common bile duct, possibly related to   postcholecystectomy state.  GALLBLADDER: Status post cholecystectomy.  SPLEEN: Within normal limits.  PANCREAS: Within normal limits.  ADRENALS: Unchanged left adrenal gland nodule.  KIDNEYS/URETERS: Diminutive left kidney. A punctate right renal lower   pole calculus is noted. No right-sided hydronephrosis.  BLADDER: Thick-walled urinary bladder containing multiple foci of air.  REPRODUCTIVE ORGANS: Coarse uterine calcification, likely reflecting   calcified uterine fibroid.  BOWEL/PERITONEUM/RETROPERITONEUM: Severe gastric distention extending to   the level of the first/second portion of duodenum, with new pneumatosis   along the posterior gastric wall, suspicious for ischemia, with multiple   adjacent foci of free intraperitoneal air (3/138-302.)  VESSELS: Atherosclerotic vascular calcification.  LYMPH NODES: No enlarged abdominal or pelvic lymph nodes.  BONES: Degenerative changes of the spine.      IMPRESSION:    1. Severe gastric distention extending to the level of the first/second   portion of duodenum, with new pneumatosis along the posterior gastric   wall, suspicious for ischemia; multiple adjacent foci of free   intraperitoneal air are noted.  2. New small linear foci of air within the peripheral liver, suspicious   for portal venous gas.  3. Thick-walled urinary bladder containing multiple foci of air; findings   may be related to recent intervention and/or underlying cystitis, however   however please note colovesicular fistula can have this appearance.   Clinical correlation is suggested.  4. Small bilateral pleural effusions with small bibasilar   opacities/atelectasis.      Spoke with DAVEY ALLEN DO; Resident Arianna on 11/3/2024 12:44 PM with   readback.    --- End of Report ---            JET WALDEN MD; Attending Radiologist  This document has been electronically signed. Nov  3 2024  1:16PM (11-03-24 @ 11:37)      CARDIOLOGY TESTING      MEDICATIONS  chlorhexidine 2% Cloths 1 Topical <User Schedule>  dexMEDEtomidine Infusion 0.05 IV Continuous <Continuous>  fentaNYL   Infusion 0.5 IV Continuous <Continuous>  fluconAZOLE IVPB 200 IV Intermittent every 24 hours  lactated ringers. 1000 IV Continuous <Continuous>  meropenem  IVPB     meropenem  IVPB 500 IV Intermittent every 12 hours  norepinephrine Infusion 0.05 IV Continuous <Continuous>  pantoprazole  Injectable 40 IV Push daily  potassium chloride  20 mEq/100 mL IVPB 20 IV Intermittent every 2 hours      WEIGHT  Weight (kg): 91.6 (11-05-24 @ 01:00)  Creatinine: 1.8 mg/dL (11-06-24 @ 06:10)      ANTIBIOTICS:  fluconAZOLE IVPB 200 milliGRAM(s) IV Intermittent every 24 hours  meropenem  IVPB      meropenem  IVPB 500 milliGRAM(s) IV Intermittent every 12 hours      All available historical records have been reviewed

## 2024-11-06 NOTE — CONSULT NOTE ADULT - ASSESSMENT
73-year-old female PMH A-fib on xarelto, HTN, s/p cholecystectomy, congenital solitary kidney presents from NH to the ED for evaluation of weakness, decreased PO intake and abdominal distension. Pt's son Everton says pt has not been feeling well the past few days, and has had poor appetite which is unlike her. She's been more tired and weak, unable to get out of bed. Has not had a bowel movement in 3 days, pt unsure if she's been passing gas otherwise. She was noted to be hypotensive and have an elevated wbc count at the NH as well.   #Septic shock 2/2 cystitis vs intraabdominal infection\par#HAGMA 2/2 lactic acidosis and uremia  Pt is intubated for AHRF, on a pressor    Vascular surgery called to r/o mesenteric ischemia after CT non-contrast showed new pneumatosis  Surgery has been on the case for gastric distension and air within gastric wall, NGT decompression  Lactate 1.0    Plan:  - I reviewed today's labs  - I reviewed and personally visualized all the radiology imagings  - I discussed the plan with attending Dr. Wheeler:  - obtain GI evaluation  - recommending CTA A/P for better visualization  - lac levels    SPECTRA [id number]

## 2024-11-06 NOTE — PROGRESS NOTE ADULT - ASSESSMENT
ASSESSMENT:  73-year-old female PMH A-fib on xarelto,  HTN, s/p cholecystectomy, congenital solitary kidney presents from NH to the ED for evaluation of weakness, decreased PO intake and abdominal distension. Found to have severe gastric distension with emphysematous changes, NG tube placed in ED with 3.1L initial output. Admitted to medical services for workup, actively resuscitated and decompressed via NG tube. Surgery consulted for gastric distension and air within gastric wall      PLAN:  - No acute surgical intervention   - f/u GI for EGD when medically optimized  - c/w NGT in place to suction. Monitor output   - Encourage ambulation as tolerated  - DVT and GI Prophylaxis  - Rest of care per primary team          ASSESSMENT:  73-year-old female PMH A-fib on xarelto,  HTN, s/p cholecystectomy, congenital solitary kidney presents from NH to the ED for evaluation of weakness, decreased PO intake and abdominal distension. Found to have severe gastric distension with emphysematous changes, NG tube placed in ED with 3.1L initial output. Admitted to medical services for workup, actively resuscitated and decompressed via NG tube. Surgery consulted for gastric distension and air within gastric wall      PLAN:  - No acute surgical intervention   - f/u GI for EGD when medically optimized  - Wean off BIPAP  - If medically optimized (off BIPAP), may do a gastrografin challenge   - c/w NGT in place to suction. Monitor output   - Encourage ambulation as tolerated  - DVT and GI Prophylaxis  - Rest of care per primary team

## 2024-11-06 NOTE — PROGRESS NOTE ADULT - ASSESSMENT
73-year-old female PMH A-fib on xarelto,, HTN, s/p cholecystectomy, congenital solitary kidney presents from NH to the ED for evaluation of weakness, decreased PO intake and abdominal distension. Admitted for sepstic shock.    #Septic shock 2/2 cystitis vs intraabdominal infection\par#HAGMA 2/2 lactic acidosis and uremia  - ED, BP 66/45 S/P LR 2800cc bolus with no improvement in BP  - wbc 19.29, vbg lactate 2.0-->2.3, pH 7.2, pCO2 45, Na 133, AG 21, Cr 4.3 (baseline 1.1),  - started on peripheral levo in the ED.  - Patient was tachypneic with high pco2 hence placed on BiPAP  - Ct abdomen/pelvis noted  -s/p cefepime and Vanc in the ED  - started on meropenem as per ID recs given UCx positive for ESBL in the past  - started flucanazole 400mg loading dose and flucanzole 200mg q24 daily  - Trend lactate, ABG, AG  - Fever curve  - Lactate trend 2.3>>3.2>>1.6>>1.1- WNL  - AG 21 >>32>>26- resolving    11/5-   - weaned down to 0.15 levophed now- MAP stabilised - MAP-81  - BIPAP to HFNC 60/60 today- patient stating well.  - started on LR @100cc/hr     11/6  - On levophed 0.15. MAP stable  - Patient intubated     #Acute hypoxic respiratory failure  - Patient initially on BiPAP with only signs of tachypenia  - Overnight stating in 70s  - Lactate: 1.6> 1.1  11/5   - pH,  7.43 pCO2, 34 pO2, - 232 HCO3-  23FIO2- 100.0  - switched to HFNC.      11/6  - pH,  7.41 pCO2, 34 pO2, - 65 HCO3-  22 FIO2- 100.0  - Patient intubated     #NOLA likely ATN 2/2 septic shock  - Baseline creatinine - 1.1  - CR on adm- 4.4  - started on LR @100cc/hr     Nephro consulted  -  Cr 4.3 > 4.2, Cr 1.4 on 11/1 and 1.0 on 7/5/24  - strict Is&Os  - daily BMP trend Cr correct lytes as needed  - f/u Ucx and Bcx   - obtain Urine studies  - c/w levophed, give fluids LR 75   - dose meropenem and flucanazole as eGFR of 30      #Abdominal distention   #pneumoperitoneum  - CT noted  - NGT placed for gastric decompression with 3.1L feculent output - intermittent suction  - GI consulted to expedite the scope if necessary  - c/w abx   - Keep NPO   - NGT to intermittent suction   - Further stabilisation for EGD  - Vascular consulted    11/5  - NGT tube draining - feculent output    #Afib on xarelto  - Hold xarelto given the NOLA  - switch to heparin gtt given CrCl <30  - Hold AC until GI recs    #HTN  - on home - coozar 100 and nefedepine 60  - hold home BP meds    MISC    #DVT prophylaxis: None for now SCDs- Hold ac if GI wants to scope  #GI prophylaxis: PPI  #Diet: NPO for now  #Activity: out of bed to chair  #Code status: Full Code  #Disposition: Acute    #Handoff  - f/u GI recs  - f/u vascular recs

## 2024-11-06 NOTE — PROGRESS NOTE ADULT - ATTENDING COMMENTS
ACS Attending Note Attestation    Patient is examined and evaluated at the bedside with the residents/PAs. Treatment plan discussed with the team, nurses, and consulting physicians and consulting teams. Medications, radiological studies and all other relevant studies reviewed. I reviewed the resident/PA note and agreed with above assessment and plan with following additions and corrections. Time devoted to teaching and to any procedures I billed separately is not included.     denies abdominal pain    Physical Exam:   I independently performed a medically appropriate exam. The exam was notable for    Abd: soft, non tender, not distended, reducible right upper quadrant hernia  NG has bilious output       Labs: I have reviewed the labs on system  WBC: 13, stable Hb, Cr:1.2  normal INR    Radiology: I have reviewed and interpreted the imaging  CXR: left sided consolidation/ effusion on chest xray  NG tube below the diaphragm    Assessment:   73-year-old female PMH A-fib on xarelto,  s/p open cholecystectomy, congenital solitary kidney presented with lethargy and anorexia, on imaging she was found to have emphysematous changes of a small area of gastric mucosa with gastric distension.   There is no history of gastroparesis  Initial NG tube placement had 3.1 L output.   I think that the emphysematous changes could be due to gastric distension and shw doesn't need any acute surgical intervention, however, she will benefit from an EGD     Continue with NG tube decompression  PPIs  EGD with GI  Remaining as per ICU team     Madai Peters MD  Trauma/ACS/Surgical Critical care Attending .

## 2024-11-06 NOTE — PROGRESS NOTE ADULT - ASSESSMENT
IMPRESSION:    Septic shock  Acute hypoxemic respiratory failure   UTI  Severe gastric distension w/ pneumatosis along posterior gastric wall.  RO GOO   Possible aspiration   Intraperitoneal free air  HAGMA  NOLA non oliguric  Afib on Xarelto  HO HTN  HO ESBL Proteus    PLAN:    CNS: Avoid depressants.  Pain control if needed.      HEENT: Oral care    PULMONARY:  HOB @ 45 degrees.  Aspiration precautions. Wean o2 as tolerated.      CARDIOVASCULAR: TTE.  Goal directed fluid resuscitation fluid deplete.   Target MAP 65.  Trend LA.  Contiinue IV hydration     GI: GI prophylaxis.  NPO w/ NGT to suction.  Surgery following.  GI eval noted noted.  Daily KUB.  RUQ sono negative.  trend LFTs     RENAL:  Follow up lytes.  Correct as needed.  Strict intake and output.  Burger for is and Os     INFECTIOUS DISEASE: Follow up cultures.  Bibi, Vanco and Fluconazole.  ID eval noted.  MRSA nares.  GI PCR.    HEMATOLOGICAL:  DVT prophylaxis.  Hold full dose anticoagulation.  Monitor CBC and coags     ENDOCRINE:  Follow up FS.  Insulin protocol if needed.    MUSCULOSKELETAL: Bed chair position.  Off loading    Full code    MICU

## 2024-11-06 NOTE — CONSULT NOTE ADULT - SUBJECTIVE AND OBJECTIVE BOX
VASCULAR SURGERY CONSULT NOTE      HPI:  73-year-old female PMH A-fib on xarelto, HTN, s/p cholecystectomy, congenital solitary kidney presents from NH to the ED for evaluation of weakness, decreased PO intake and abdominal distension. Pt's son Everton says pt has not been feeling well the past few days, and has had poor appetite which is unlike her. She's been more tired and weak, unable to get out of bed. Has not had a bowel movement in 3 days, pt unsure if she's been passing gas otherwise. She was noted to be hypotensive and have an elevated wbc count at the NH as well. No dysuria, chest pain, SOB, cough or fever per pt otherwise.  No other complaints currently.     In the ED, BP 66/45 S/P LR 2800cc bolus with no improvement in BP, started on peripheral levo. Labs with wbc 19.29, vbg lactate 2.0-->2.3, pH 7.2, pCO2 45, Na 133, AG 21, Cr 4.3 (baseline 1.1), UA contaminated. CTAP with severe gastric distension, multiple foci of intraperitoneal free air and urinary bladder thickening with free air. Surgery consulted, no intervention for now, NGT placed for gastric decompression with 3.1L feculent output, recommend GI consult.      (03 Nov 2024 21:25)        PAST MEDICAL & SURGICAL HISTORY:  HTN (hypertension)      Diabetes mellitus      Obesity      Gastroesophageal reflux disease      Active asthma      Generalized OA      Afib      H/O hernia repair  2011 and revised in 2013      History of cholecystectomy        No Known Allergies    Home Medications:  acetaminophen 325 mg oral tablet: 2 tab(s) orally every 6 hours, As Needed - for fever, for moderate pain (03 Nov 2024 20:02)  alendronate 70 mg oral tablet: 1 tab(s) orally once a day (03 Nov 2024 20:01)  ascorbic acid 500 mg oral tablet: 1 tab(s) orally once a day (03 Nov 2024 20:01)  cholecalciferol oral tablet: 400 unit(s) orally once a day (03 Nov 2024 20:02)  Cozaar 100 mg oral tablet: 1 tab(s) orally once a day (03 Nov 2024 20:02)  cyclobenzaprine 10 mg oral tablet: 1 tab(s) orally once a day (03 Nov 2024 20:02)  famotidine 20 mg oral tablet: 1 tab(s) orally once a day (03 Nov 2024 20:02)  ibuprofen 400 mg oral tablet: 1 tab(s) orally once a day as needed for  moderate pain (03 Nov 2024 20:02)  labetalol 100 mg oral tablet: 1 tab(s) orally 3 times a day (03 Nov 2024 21:49)  loperamide 2 mg oral capsule: 1 cap(s) orally every 12 hours  ADMINISTER BEFORE MEALS  STOP AFTER 2 DOSES (03 Nov 2024 20:02)  magnesium hydroxide 400 mg oral tablet, chewable: orally once a day (03 Nov 2024 20:02)  Nifedical XL 60 mg oral tablet, extended release: 1 tab(s) orally once a day (03 Nov 2024 20:02)  Percocet 10 mg-325 mg oral tablet: 1 tab(s) orally every 8 hours as needed for  severe pain (03 Nov 2024 20:02)  polyethylene glycol 3350 oral powder for reconstitution: 17 gram(s) orally once a day (03 Nov 2024 20:02)  rivaroxaban 15 mg oral tablet: 1 tab(s) orally once a day (03 Nov 2024 19:55)  saccharomyces boulardii lyo 250 mg oral capsule: 1 cap(s) orally 2 times a day (03 Nov 2024 20:02)    No permtinent family history of PVD    REVIEW OF SYSTEMS:  GENERAL:                                         negative  SKIN:                                                 negative  OPTHALMOLOGIC:                          negative  ENMT:                                               negative  RESPIRATORY AND THORAX:        negative  CARDIOVASCULAR:                         see HPI  GASTROINTESTINAL:                       negative  NEPHROLOGY:                                  negative  MUSCULOSKELETAL:                       negative  NEUROLOGIC:                                   negative  PSYCHIATRIC:                                    negative  HEMATOLOGY/LYMPHATICS:         negative  ENDOCRINE:                                     negative  ALLERGIC/IMMUNOLOGIC:            negative    12 point ROS otherwise normal except as stated in HPI  FHx: none  SHX:  [ ]  smoking     [ ] alcohol use    Vital Signs Last 24 Hrs  T(C): 37.3 (06 Nov 2024 08:00), Max: 37.9 (06 Nov 2024 01:30)  T(F): 99.1 (06 Nov 2024 08:00), Max: 100.3 (06 Nov 2024 01:30)  HR: 76 (06 Nov 2024 14:00) (75 - 104)  BP: 107/53 (06 Nov 2024 14:00) (90/42 - 151/64)  BP(mean): 76 (06 Nov 2024 14:00) (61 - 97)  RR: 16 (06 Nov 2024 14:00) (13 - 46)  SpO2: 100% (06 Nov 2024 14:00) (78% - 100%)    Parameters below as of 06 Nov 2024 08:00  Patient On (Oxygen Delivery Method): BiPAP/CPAP    O2 Concentration (%): 60      PHYSICAL EXAM  Appearance: Normal	  HEENT:   Normal oral mucosa, PERRL, EOMI	  Neck: Supple, - JVD;   Cardiovascular: Normal S1 S2, No JVD, No murmurs,   Respiratory: Lungs clear to auscultation, No Rales, Rhonchi, Wheezing	  Gastrointestinal:  Soft, Non-tender, positive BS	  Skin: No rashes, No ecchymoses, No cyanosis  Extremities: Normal range of motion, No clubbing, cyanosis or edema  Neurologic: Non-focal  Psychiatry: intubated, unresponsive          MEDICATIONS:   MEDICATIONS  (STANDING):  chlorhexidine 0.12% Liquid 15 milliLiter(s) Oral Mucosa every 12 hours  chlorhexidine 2% Cloths 1 Application(s) Topical <User Schedule>  dexMEDEtomidine Infusion 0.05 MICROgram(s)/kG/Hr (1.15 mL/Hr) IV Continuous <Continuous>  fentaNYL   Infusion 0.5 MICROgram(s)/kG/Hr (4.58 mL/Hr) IV Continuous <Continuous>  fluconAZOLE IVPB 200 milliGRAM(s) IV Intermittent every 24 hours  lactated ringers. 1000 milliLiter(s) (100 mL/Hr) IV Continuous <Continuous>  meropenem  IVPB      meropenem  IVPB 500 milliGRAM(s) IV Intermittent every 12 hours  norepinephrine Infusion 0.05 MICROgram(s)/kG/Min (8.48 mL/Hr) IV Continuous <Continuous>  pantoprazole  Injectable 40 milliGRAM(s) IV Push daily  potassium chloride  20 mEq/100 mL IVPB 20 milliEquivalent(s) IV Intermittent every 2 hours    MEDICATIONS  (PRN):      LAB/STUDIES:                        9.1    23.17 )-----------( 181      ( 06 Nov 2024 06:10 )             27.3     11-06    150[H]  |  127[H]  |  36[H]  ----------------------------<  60[L]  2.1[LL]   |  15[L]  |  0.8    Ca    6.6[L]      06 Nov 2024 12:30  Mg     1.8     11-06    TPro  4.8[L]  /  Alb  2.8[L]  /  TBili  0.9  /  DBili  x   /  AST  11  /  ALT  23  /  AlkPhos  126[H]  11-06      LIVER FUNCTIONS - ( 06 Nov 2024 06:10 )  Alb: 2.8 g/dL / Pro: 4.8 g/dL / ALK PHOS: 126 U/L / ALT: 23 U/L / AST: 11 U/L / GGT: x             Urinalysis Basic - ( 06 Nov 2024 12:30 )    Color: x / Appearance: x / SG: x / pH: x  Gluc: 60 mg/dL / Ketone: x  / Bili: x / Urobili: x   Blood: x / Protein: x / Nitrite: x   Leuk Esterase: x / RBC: x / WBC x   Sq Epi: x / Non Sq Epi: x / Bacteria: x              ABG - ( 06 Nov 2024 12:38 )  pH, Arterial: 7.44  pH, Blood: x     /  pCO2: 38    /  pO2: 195   / HCO3: 26    / Base Excess: 1.6   /  SaO2: 96.3            IMAGING:    CT Abdomen and Pelvis No Cont (11.03.24 @ 11:37) >  1. Severe gastric distention extending to the level of the first/second   portion of duodenum, with new pneumatosis along the posterior gastric   wall, suspicious for ischemia; multiple adjacent foci of free   intraperitoneal air are noted.  2. New small linear foci of air within the peripheral liver, suspicious   for portal venous gas.  3. Thick-walled urinary bladder containing multiple foci of air; findings   may be related to recent intervention and/or underlying cystitis, however   however please note colovesicular fistula can have this appearance.   Clinical correlation is suggested.  4. Small bilateral pleural effusions with small bibasilar   opacities/atelectasis.

## 2024-11-06 NOTE — PROGRESS NOTE ADULT - ASSESSMENT
ASSESSMENT  73-year-old female PMH A-fib on xarelto,, HTN, s/p cholecystectomy, congenital solitary kidney presents from NH to the ED for evaluation of weakness, decreased PO intake and abdominal distension. Pt's son Everton says pt has not been feeling well the past few days, and has had poor appetite which is unlike her. She's been more tired and weak, unable to get out of bed. Has not had a bowel movement in 3 days, pt unsure if she's been passing gas otherwise. She was noted to be hypotensive and have an elevated wbc count at the NH as well. No dysuria, chest pain, SOB, cough or fever per pt otherwise.  No other complaints currently.     In the ED, BP 66/45 S/P LR 2800cc bolus with no improvement in BP, started on peripheral levo. Labs with wbc 19.29, vbg lactate 2.0-->2.3, pH 7.2, pCO2 45, Na 133, AG 21, Cr 4.3 (baseline 1.1), UA contaminated. CTAP with severe gastric distension, multiple foci of intraperitoneal free air and urinary bladder thickening with free air. Surgery consulted, no intervention for now, NGT placed for gastric decompression with 3.1L feculent output,     IMPRESSIONS  #Septic shock requiring pressors   Rule out acute cystitis/pyelo as UA pyuria > 900; UCX   >100,000 CFU/ml Streptococcus gallolyticus "Susceptibilities not performed"  Rule out mesenteric ischemia- Severe gastric distension w/ pneumatosis along posterior gastric wall. Free air     Admission WBC 19--> 21    11/3 BCX NGTD x2  < from: CT Abdomen and Pelvis No Cont (11.03.24 @ 11:37) >  1. Severe gastric distention extending to the level of the first/second   portion of duodenum, with new pneumatosis along the posterior gastric   wall, suspicious for ischemia; multiple adjacent foci of free   intraperitoneal air are noted.  2. New small linear foci of air within the peripheral liver, suspicious for portal venous gas.  3. Thick-walled urinary bladder containing multiple foci of air; findings may be related to recent intervention and/or underlying cystitis, however   however please note colovesicular fistula can have this appearance. Clinical correlation is suggested.  4. Small bilateral pleural effusions with small bibasilar opacities/atelectasis.    Stool cx NG    1/2023 UCX ESBL proteus     Cdiff (-)   #Lactic acidosis  #NOLA     RECOMMENDATIONS  - meropenem  IVPB 500 milliGRAM(s) IV Intermittent every 12 hours, continue given critical illness  - fluconazole 200mg daily IV, monitor LFTs. Obtain EKG to check QTC   - Strep gallolyticus can be associated with underlying GI malignancy , workup per primary team   - GI / Surgery   - Poor prognosis, GOC    If any questions, please text or call on [employer] Teams  Please continue to update ID with any pertinent new clinical, laboratory or radiographic findings     ASSESSMENT  73-year-old female PMH A-fib on xarelto,, HTN, s/p cholecystectomy, congenital solitary kidney presents from NH to the ED for evaluation of weakness, decreased PO intake and abdominal distension. Pt's son Everton says pt has not been feeling well the past few days, and has had poor appetite which is unlike her. She's been more tired and weak, unable to get out of bed. Has not had a bowel movement in 3 days, pt unsure if she's been passing gas otherwise. She was noted to be hypotensive and have an elevated wbc count at the NH as well. No dysuria, chest pain, SOB, cough or fever per pt otherwise.  No other complaints currently.     In the ED, BP 66/45 S/P LR 2800cc bolus with no improvement in BP, started on peripheral levo. Labs with wbc 19.29, vbg lactate 2.0-->2.3, pH 7.2, pCO2 45, Na 133, AG 21, Cr 4.3 (baseline 1.1), UA contaminated. CTAP with severe gastric distension, multiple foci of intraperitoneal free air and urinary bladder thickening with free air. Surgery consulted, no intervention for now, NGT placed for gastric decompression with 3.1L feculent output,     IMPRESSIONS  # intubated on mechanical ventilation   #Septic shock requiring pressors   Rule out acute cystitis/pyelo as UA pyuria > 900; UCX   >100,000 CFU/ml Streptococcus gallolyticus "Susceptibilities not performed"  Rule out mesenteric ischemia- Severe gastric distension w/ pneumatosis along posterior gastric wall. Free air     Admission WBC 19--> 21    11/3 BCX NGTD x2  < from: CT Abdomen and Pelvis No Cont (11.03.24 @ 11:37) >  1. Severe gastric distention extending to the level of the first/second   portion of duodenum, with new pneumatosis along the posterior gastric   wall, suspicious for ischemia; multiple adjacent foci of free   intraperitoneal air are noted.  2. New small linear foci of air within the peripheral liver, suspicious for portal venous gas.  3. Thick-walled urinary bladder containing multiple foci of air; findings may be related to recent intervention and/or underlying cystitis, however   however please note colovesicular fistula can have this appearance. Clinical correlation is suggested.  4. Small bilateral pleural effusions with small bibasilar opacities/atelectasis.    Stool cx NG    1/2023 UCX ESBL proteus     Cdiff (-)   #Lactic acidosis  #NOLA     RECOMMENDATIONS  - meropenem  IVPB 500 milliGRAM(s) IV Intermittent every 12 hours, continue given critical illness  - fluconazole 200mg daily IV, monitor LFTs. Obtain EKG to check QTC   - Strep gallolyticus can be associated with underlying GI malignancy , workup per primary team   - GI / Surgery   - Poor prognosis, GOC    If any questions, please text or call on [employer] Teams  Please continue to update ID with any pertinent new clinical, laboratory or radiographic findings

## 2024-11-06 NOTE — PROGRESS NOTE ADULT - SUBJECTIVE AND OBJECTIVE BOX
GENERAL SURGERY PROGRESS NOTE    Patient: PATRICIA SPRAGUE , 73y ([birth year])Female   MRN: [id number]  Location: [address]  Visit: 11-03-24 Inpatient  Date: 11-06-24 @ 09:33    Hospital Day #: 4    Events of past 24 hours: Patient seen and examined at bedside. Patient was put back on BIPAP at 60%. Patient on levo .133. NGT to suction with 350 output.     PAST MEDICAL & SURGICAL HISTORY:  HTN (hypertension)      Diabetes mellitus      Obesity      Gastroesophageal reflux disease      Active asthma      Generalized OA      Afib      H/O hernia repair  2011 and revised in 2013      History of cholecystectomy          Vitals:   T(F): 99.1 (11-06-24 @ 08:00), Max: 100.3 (11-06-24 @ 01:30)  HR: 93 (11-06-24 @ 08:50)  BP: 131/60 (11-06-24 @ 08:00)  RR: 30 (11-06-24 @ 08:00)  SpO2: 100% (11-06-24 @ 08:50)      Diet, NPO:   With Ice Chips/Sips of Water      Fluids:     I & O's:    11-05-24 @ 07:01  -  11-06-24 @ 07:00  --------------------------------------------------------  IN:    IV PiggyBack: 150 mL    IV PiggyBack: 250 mL    Lactated Ringers: 75 mL    Lactated Ringers: 2000 mL    Lactated Ringers Bolus: 500 mL    Norepinephrine: 390.9 mL  Total IN: 3365.9 mL    OUT:    Indwelling Catheter - Urethral (mL): 2775 mL    Nasogastric/Oral tube (mL): 350 mL  Total OUT: 3125 mL    Total NET: 240.9 mL      PHYSICAL EXAM:  General Appearance: NAD  HEENT: Normocephalic, atraumatic, NG tube in place, feculent colored output  Heart: RRR  Lungs: Saturating 100% on BIPAP 60%  Abdomen:  Soft, nontender, nondistended. No rebound or guarding.  MSK/Extremities: Warm & well-perfused.   Skin: Warm, dry. No jaundice.     MEDICATIONS  (STANDING):  chlorhexidine 2% Cloths 1 Application(s) Topical <User Schedule>  fluconAZOLE IVPB 200 milliGRAM(s) IV Intermittent every 24 hours  lactated ringers. 1000 milliLiter(s) (100 mL/Hr) IV Continuous <Continuous>  meropenem  IVPB      meropenem  IVPB 500 milliGRAM(s) IV Intermittent every 12 hours  norepinephrine Infusion 0.05 MICROgram(s)/kG/Min (8.48 mL/Hr) IV Continuous <Continuous>  pantoprazole  Injectable 40 milliGRAM(s) IV Push daily    MEDICATIONS  (PRN):      DVT PROPHYLAXIS:   GI PROPHYLAXIS: pantoprazole  Injectable 40 milliGRAM(s) IV Push daily    ANTICOAGULATION:   ANTIBIOTICS:  fluconAZOLE IVPB 200 milliGRAM(s)  meropenem  IVPB    meropenem  IVPB 500 milliGRAM(s)      LAB/STUDIES:  Labs:  CAPILLARY BLOOD GLUCOSE                          9.1    23.17 )-----------( 181      ( 06 Nov 2024 06:10 )             27.3       Auto Neutrophil %: 85.3 % (11-06-24 @ 06:10)  Auto Immature Granulocyte %: 1.3 % (11-06-24 @ 06:10)    11-06    152[H]  |  113[H]  |  63[HH]  ----------------------------<  91  3.3[L]   |  24  |  1.8[H]      Calcium: 11.6 mg/dL (11-06-24 @ 06:10)      LFTs:             4.8  | 0.9  | 11       ------------------[126     ( 06 Nov 2024 06:10 )  2.8  | x    | 23          Lipase:x      Amylase:x         Blood Gas Arterial, Lactate: 1.1 mmol/L (11-06-24 @ 00:29)  Blood Gas Arterial, Lactate: 1.6 mmol/L (11-05-24 @ 22:50)  Lactate, Blood: 1.5 mmol/L (11-04-24 @ 16:58)  Blood Gas Arterial, Lactate: 1.0 mmol/L (11-04-24 @ 13:35)  Lactate, Blood: 3.2 mmol/L (11-04-24 @ 07:19)  Blood Gas Arterial, Lactate: 1.2 mmol/L (11-04-24 @ 02:28)  Lactate, Blood: 2.9 mmol/L (11-03-24 @ 22:37)  Blood Gas Venous - Lactate: 2.3 mmol/L (11-03-24 @ 19:09)  Lactate, Blood: 2.5 mmol/L (11-03-24 @ 15:33)  Blood Gas Venous - Lactate: 2.0 mmol/L (11-03-24 @ 11:54)  Lactate, Blood: 2.2 mmol/L (11-03-24 @ 11:10)    ABG - ( 06 Nov 2024 00:29 )  pH: 7.43  /  pCO2: 34    /  pO2: 232   / HCO3: 23    / Base Excess: -1.3  /  SaO2: 96.3        ABG - ( 05 Nov 2024 22:50 )  pH: 7.41  /  pCO2: 34    /  pO2: 65    / HCO3: 22    / Base Excess: -2.5  /  SaO2: 91.9      ABG - ( 04 Nov 2024 13:35 )  pH: 7.38  /  pCO2: 33    /  pO2: 70    / HCO3: 20    / Base Excess: -4.9  /  SaO2: 92.6        Coags:      Urinalysis Basic - ( 06 Nov 2024 06:10 )    Color: x / Appearance: x / SG: x / pH: x  Gluc: 91 mg/dL / Ketone: x  / Bili: x / Urobili: x   Blood: x / Protein: x / Nitrite: x   Leuk Esterase: x / RBC: x / WBC x   Sq Epi: x / Non Sq Epi: x / Bacteria: x        Urinalysis with Rflx Culture (collected 03 Nov 2024 13:51)    Culture - Stool (collected 03 Nov 2024 13:51)  Source: .Stool  Final Report (05 Nov 2024 19:02):    No enteric pathogens isolated.    (Stool culture examined for Salmonella,    Shigella, Campylobacter, Aeromonas, Plesiomonas,    Vibrio, E.coli O157 and Yersinia)    Culture - Urine (collected 03 Nov 2024 13:51)  Source: Clean Catch None  Final Report (05 Nov 2024 12:11):    >100,000 CFU/ml Streptococcus gallolyticus "Susceptibilities not    performed"    <10,000 CFU/ml Normal Urogenital rosalva present    Culture - Blood (collected 03 Nov 2024 11:10)  Source: .Blood BLOOD  Preliminary Report (05 Nov 2024 18:01):    No growth at 48 Hours    Culture - Blood (collected 03 Nov 2024 11:10)  Source: .Blood BLOOD  Preliminary Report (05 Nov 2024 18:01):    No growth at 48 Hours

## 2024-11-06 NOTE — PROGRESS NOTE ADULT - ATTENDING COMMENTS
Pt with gastric distension, possible ischemia. NGT in place with bilious output. Abd soft benign. Follow up xray. Continue PPI. Plan for EGD to further evaluate.

## 2024-11-06 NOTE — PROGRESS NOTE ADULT - ASSESSMENT
73-year-old female PMH A-fib on xarelto,, HTN, s/p cholecystectomy, congenital solitary kidney presents from NH to the ED for evaluation of weakness, decreased PO intake and abdominal distension. Has not had a bowel movement in 3 days, pt unsure if she's been passing gas otherwise. She was noted to be hypotensive and have an elevated wbc count at the NH as well.  In the ED, BP 66/45 S/P LR started on  levo. Labs with wbc 19.29 CTAP with severe gastric distension, multiple foci of intraperitoneal free air and urinary bladder thickening with free air. Surgery consulted, no intervention for now, NGT placed for gastric decompression. GI was consulted for gastric distention and concern of ischemia on imaging.     Septic shock   Gastric distention s/p decompression   Pneumatosis and concern of gastric wall ischemia   free intraperitoneal air r/o perforation  S/P PEG 2/2023 post intubation, removed in NH.   s/p cholecystectomy, mildly dilated duct.   lactic acidosis   Chronic elevation of alk phos    PLAN   Keep NPO   NGT to intermittent suction   Correct electrolytes   Hold AC   EGD in AM   Abx per ID   Surgery following   Supportive care per primary team.   Will follow      73-year-old female PMH A-fib on xarelto,, HTN, s/p cholecystectomy, congenital solitary kidney presents from NH to the ED for evaluation of weakness, decreased PO intake and abdominal distension. Has not had a bowel movement in 3 days, pt unsure if she's been passing gas otherwise. She was noted to be hypotensive and have an elevated wbc count at the NH as well.  In the ED, BP 66/45 S/P LR started on  levo. Labs with wbc 19.29 CTAP with severe gastric distension, multiple foci of intraperitoneal free air and urinary bladder thickening with free air. Surgery consulted, no intervention for now, NGT placed for gastric decompression. GI was consulted for gastric distention and concern of ischemia on imaging.     Septic shock   Gastric distention s/p decompression   Pneumatosis and concern of gastric wall ischemia   free intraperitoneal air r/o perforation  S/P PEG 2/2023 post intubation, removed in NH.   s/p cholecystectomy, mildly dilated duct.   lactic acidosis   Chronic elevation of alk phos    PLAN   Keep NPO   NGT to intermittent suction   Correct electrolytes   Hold AC   Follow up xray  EGD in AM   Abx per ID   Surgery following   Supportive care per primary team.   Will follow

## 2024-11-07 ENCOUNTER — RESULT REVIEW (OUTPATIENT)
Age: 74
End: 2024-11-07

## 2024-11-07 ENCOUNTER — TRANSCRIPTION ENCOUNTER (OUTPATIENT)
Age: 74
End: 2024-11-07

## 2024-11-07 LAB
ALBUMIN SERPL ELPH-MCNC: 2.6 G/DL — LOW (ref 3.5–5.2)
ALP SERPL-CCNC: 104 U/L — SIGNIFICANT CHANGE UP (ref 30–115)
ALT FLD-CCNC: 16 U/L — SIGNIFICANT CHANGE UP (ref 0–41)
ANION GAP SERPL CALC-SCNC: 5 MMOL/L — LOW (ref 7–14)
ANION GAP SERPL CALC-SCNC: 6 MMOL/L — LOW (ref 7–14)
ANION GAP SERPL CALC-SCNC: 6 MMOL/L — LOW (ref 7–14)
ANION GAP SERPL CALC-SCNC: 8 MMOL/L — SIGNIFICANT CHANGE UP (ref 7–14)
APTT BLD: 28.8 SEC — SIGNIFICANT CHANGE UP (ref 27–39.2)
AST SERPL-CCNC: 8 U/L — SIGNIFICANT CHANGE UP (ref 0–41)
BASE EXCESS BLDA CALC-SCNC: 2.8 MMOL/L — SIGNIFICANT CHANGE UP (ref -2–3)
BASOPHILS # BLD AUTO: 0.02 K/UL — SIGNIFICANT CHANGE UP (ref 0–0.2)
BASOPHILS # BLD AUTO: 0.02 K/UL — SIGNIFICANT CHANGE UP (ref 0–0.2)
BASOPHILS NFR BLD AUTO: 0.1 % — SIGNIFICANT CHANGE UP (ref 0–1)
BASOPHILS NFR BLD AUTO: 0.2 % — SIGNIFICANT CHANGE UP (ref 0–1)
BILIRUB SERPL-MCNC: 0.6 MG/DL — SIGNIFICANT CHANGE UP (ref 0.2–1.2)
BUN SERPL-MCNC: 39 MG/DL — HIGH (ref 10–20)
BUN SERPL-MCNC: 40 MG/DL — HIGH (ref 10–20)
BUN SERPL-MCNC: 40 MG/DL — HIGH (ref 10–20)
BUN SERPL-MCNC: 48 MG/DL — HIGH (ref 10–20)
CALCIUM SERPL-MCNC: 11.1 MG/DL — HIGH (ref 8.4–10.5)
CALCIUM SERPL-MCNC: 11.5 MG/DL — HIGH (ref 8.4–10.5)
CALCIUM SERPL-MCNC: 11.6 MG/DL — HIGH (ref 8.4–10.5)
CALCIUM SERPL-MCNC: 12.5 MG/DL — HIGH (ref 8.4–10.5)
CHLORIDE SERPL-SCNC: 116 MMOL/L — HIGH (ref 98–110)
CHLORIDE SERPL-SCNC: 116 MMOL/L — HIGH (ref 98–110)
CHLORIDE SERPL-SCNC: 117 MMOL/L — HIGH (ref 98–110)
CHLORIDE SERPL-SCNC: 121 MMOL/L — HIGH (ref 98–110)
CO2 SERPL-SCNC: 25 MMOL/L — SIGNIFICANT CHANGE UP (ref 17–32)
CO2 SERPL-SCNC: 29 MMOL/L — SIGNIFICANT CHANGE UP (ref 17–32)
CO2 SERPL-SCNC: 29 MMOL/L — SIGNIFICANT CHANGE UP (ref 17–32)
CO2 SERPL-SCNC: 30 MMOL/L — SIGNIFICANT CHANGE UP (ref 17–32)
CREAT SERPL-MCNC: 1.1 MG/DL — SIGNIFICANT CHANGE UP (ref 0.7–1.5)
CREAT SERPL-MCNC: 1.2 MG/DL — SIGNIFICANT CHANGE UP (ref 0.7–1.5)
D DIMER BLD IA.RAPID-MCNC: 2405 NG/ML DDU — HIGH
EGFR: 48 ML/MIN/1.73M2 — LOW
EGFR: 54 ML/MIN/1.73M2 — LOW
EOSINOPHIL # BLD AUTO: 0.14 K/UL — SIGNIFICANT CHANGE UP (ref 0–0.7)
EOSINOPHIL # BLD AUTO: 0.16 K/UL — SIGNIFICANT CHANGE UP (ref 0–0.7)
EOSINOPHIL NFR BLD AUTO: 1 % — SIGNIFICANT CHANGE UP (ref 0–8)
EOSINOPHIL NFR BLD AUTO: 1.2 % — SIGNIFICANT CHANGE UP (ref 0–8)
FIBRINOGEN PPP-MCNC: 566 MG/DL — HIGH (ref 200–435)
GLUCOSE SERPL-MCNC: 106 MG/DL — HIGH (ref 70–99)
GLUCOSE SERPL-MCNC: 141 MG/DL — HIGH (ref 70–99)
GLUCOSE SERPL-MCNC: 144 MG/DL — HIGH (ref 70–99)
GLUCOSE SERPL-MCNC: 162 MG/DL — HIGH (ref 70–99)
HCO3 BLDA-SCNC: 27 MMOL/L — SIGNIFICANT CHANGE UP (ref 21–28)
HCT VFR BLD CALC: 25.5 % — LOW (ref 37–47)
HCT VFR BLD CALC: 26.5 % — LOW (ref 37–47)
HGB BLD-MCNC: 8 G/DL — LOW (ref 12–16)
HGB BLD-MCNC: 8.5 G/DL — LOW (ref 12–16)
HOROWITZ INDEX BLDA+IHG-RTO: 60 — SIGNIFICANT CHANGE UP
IMM GRANULOCYTES NFR BLD AUTO: 1 % — HIGH (ref 0.1–0.3)
IMM GRANULOCYTES NFR BLD AUTO: 1.7 % — HIGH (ref 0.1–0.3)
INR BLD: 1 RATIO — SIGNIFICANT CHANGE UP (ref 0.65–1.3)
LYMPHOCYTES # BLD AUTO: 1.08 K/UL — LOW (ref 1.2–3.4)
LYMPHOCYTES # BLD AUTO: 1.15 K/UL — LOW (ref 1.2–3.4)
LYMPHOCYTES # BLD AUTO: 8.1 % — LOW (ref 20.5–51.1)
LYMPHOCYTES # BLD AUTO: 8.6 % — LOW (ref 20.5–51.1)
MAGNESIUM SERPL-MCNC: 1.7 MG/DL — LOW (ref 1.8–2.4)
MCHC RBC-ENTMCNC: 29.7 PG — SIGNIFICANT CHANGE UP (ref 27–31)
MCHC RBC-ENTMCNC: 30 PG — SIGNIFICANT CHANGE UP (ref 27–31)
MCHC RBC-ENTMCNC: 31.4 G/DL — LOW (ref 32–37)
MCHC RBC-ENTMCNC: 32.1 G/DL — SIGNIFICANT CHANGE UP (ref 32–37)
MCV RBC AUTO: 93.6 FL — SIGNIFICANT CHANGE UP (ref 81–99)
MCV RBC AUTO: 94.8 FL — SIGNIFICANT CHANGE UP (ref 81–99)
MONOCYTES # BLD AUTO: 1.3 K/UL — HIGH (ref 0.1–0.6)
MONOCYTES # BLD AUTO: 1.33 K/UL — HIGH (ref 0.1–0.6)
MONOCYTES NFR BLD AUTO: 9.8 % — HIGH (ref 1.7–9.3)
MONOCYTES NFR BLD AUTO: 9.9 % — HIGH (ref 1.7–9.3)
NEUTROPHILS # BLD AUTO: 10.49 K/UL — HIGH (ref 1.4–6.5)
NEUTROPHILS # BLD AUTO: 10.65 K/UL — HIGH (ref 1.4–6.5)
NEUTROPHILS NFR BLD AUTO: 79 % — HIGH (ref 42.2–75.2)
NEUTROPHILS NFR BLD AUTO: 79.4 % — HIGH (ref 42.2–75.2)
NRBC # BLD: 0 /100 WBCS — SIGNIFICANT CHANGE UP (ref 0–0)
NRBC # BLD: 0 /100 WBCS — SIGNIFICANT CHANGE UP (ref 0–0)
PCO2 BLDA: 40 MMHG — SIGNIFICANT CHANGE UP (ref 32–45)
PH BLDA: 7.44 — SIGNIFICANT CHANGE UP (ref 7.35–7.45)
PLATELET # BLD AUTO: 140 K/UL — SIGNIFICANT CHANGE UP (ref 130–400)
PLATELET # BLD AUTO: 143 K/UL — SIGNIFICANT CHANGE UP (ref 130–400)
PMV BLD: 11.9 FL — HIGH (ref 7.4–10.4)
PMV BLD: 12.9 FL — HIGH (ref 7.4–10.4)
PO2 BLDA: 146 MMHG — HIGH (ref 83–108)
POTASSIUM SERPL-MCNC: 3.9 MMOL/L — SIGNIFICANT CHANGE UP (ref 3.5–5)
POTASSIUM SERPL-MCNC: 4 MMOL/L — SIGNIFICANT CHANGE UP (ref 3.5–5)
POTASSIUM SERPL-MCNC: 4.1 MMOL/L — SIGNIFICANT CHANGE UP (ref 3.5–5)
POTASSIUM SERPL-MCNC: 4.1 MMOL/L — SIGNIFICANT CHANGE UP (ref 3.5–5)
POTASSIUM SERPL-SCNC: 3.9 MMOL/L — SIGNIFICANT CHANGE UP (ref 3.5–5)
POTASSIUM SERPL-SCNC: 4 MMOL/L — SIGNIFICANT CHANGE UP (ref 3.5–5)
POTASSIUM SERPL-SCNC: 4.1 MMOL/L — SIGNIFICANT CHANGE UP (ref 3.5–5)
POTASSIUM SERPL-SCNC: 4.1 MMOL/L — SIGNIFICANT CHANGE UP (ref 3.5–5)
PROT SERPL-MCNC: 4.6 G/DL — LOW (ref 6–8)
PROTHROM AB SERPL-ACNC: 11.8 SEC — SIGNIFICANT CHANGE UP (ref 9.95–12.87)
RBC # BLD: 2.69 M/UL — LOW (ref 4.2–5.4)
RBC # BLD: 2.83 M/UL — LOW (ref 4.2–5.4)
RBC # FLD: 13.9 % — SIGNIFICANT CHANGE UP (ref 11.5–14.5)
RBC # FLD: 14.1 % — SIGNIFICANT CHANGE UP (ref 11.5–14.5)
SAO2 % BLDA: 96.2 % — SIGNIFICANT CHANGE UP (ref 94–98)
SODIUM SERPL-SCNC: 150 MMOL/L — HIGH (ref 135–146)
SODIUM SERPL-SCNC: 151 MMOL/L — HIGH (ref 135–146)
SODIUM SERPL-SCNC: 151 MMOL/L — HIGH (ref 135–146)
SODIUM SERPL-SCNC: 156 MMOL/L — HIGH (ref 135–146)
WBC # BLD: 13.27 K/UL — HIGH (ref 4.8–10.8)
WBC # BLD: 13.43 K/UL — HIGH (ref 4.8–10.8)
WBC # FLD AUTO: 13.27 K/UL — HIGH (ref 4.8–10.8)
WBC # FLD AUTO: 13.43 K/UL — HIGH (ref 4.8–10.8)

## 2024-11-07 PROCEDURE — 99232 SBSQ HOSP IP/OBS MODERATE 35: CPT

## 2024-11-07 PROCEDURE — 74018 RADEX ABDOMEN 1 VIEW: CPT | Mod: 26

## 2024-11-07 PROCEDURE — 88305 TISSUE EXAM BY PATHOLOGIST: CPT | Mod: 26

## 2024-11-07 PROCEDURE — 88312 SPECIAL STAINS GROUP 1: CPT | Mod: 26

## 2024-11-07 PROCEDURE — 71045 X-RAY EXAM CHEST 1 VIEW: CPT | Mod: 26,77

## 2024-11-07 PROCEDURE — 71045 X-RAY EXAM CHEST 1 VIEW: CPT | Mod: 26

## 2024-11-07 PROCEDURE — 99291 CRITICAL CARE FIRST HOUR: CPT

## 2024-11-07 PROCEDURE — 43239 EGD BIOPSY SINGLE/MULTIPLE: CPT

## 2024-11-07 RX ORDER — 0.9 % SODIUM CHLORIDE 0.9 %
1000 INTRAVENOUS SOLUTION INTRAVENOUS
Refills: 0 | Status: DISCONTINUED | OUTPATIENT
Start: 2024-11-07 | End: 2024-11-08

## 2024-11-07 RX ORDER — MEROPENEM 500 MG/1
1000 INJECTION, POWDER, FOR SOLUTION INTRAVENOUS EVERY 12 HOURS
Refills: 0 | Status: DISCONTINUED | OUTPATIENT
Start: 2024-11-07 | End: 2024-11-08

## 2024-11-07 RX ORDER — PANTOPRAZOLE SODIUM 40 MG/1
40 TABLET, DELAYED RELEASE ORAL
Refills: 0 | Status: DISCONTINUED | OUTPATIENT
Start: 2024-11-07 | End: 2024-11-20

## 2024-11-07 RX ORDER — 0.9 % SODIUM CHLORIDE 0.9 %
500 INTRAVENOUS SOLUTION INTRAVENOUS ONCE
Refills: 0 | Status: COMPLETED | OUTPATIENT
Start: 2024-11-07 | End: 2024-11-07

## 2024-11-07 RX ORDER — ACETAMINOPHEN 500MG 500 MG/1
1000 TABLET, COATED ORAL ONCE
Refills: 0 | Status: COMPLETED | OUTPATIENT
Start: 2024-11-07 | End: 2024-11-07

## 2024-11-07 RX ADMIN — ACETAMINOPHEN 500MG 400 MILLIGRAM(S): 500 TABLET, COATED ORAL at 04:55

## 2024-11-07 RX ADMIN — Medication 500 MILLILITER(S): at 09:27

## 2024-11-07 RX ADMIN — MEROPENEM 100 MILLIGRAM(S): 500 INJECTION, POWDER, FOR SOLUTION INTRAVENOUS at 18:00

## 2024-11-07 RX ADMIN — Medication 25 GRAM(S): at 04:28

## 2024-11-07 RX ADMIN — CHLORHEXIDINE GLUCONATE 15 MILLILITER(S): 1.2 RINSE ORAL at 18:00

## 2024-11-07 RX ADMIN — FENTANYL 4.58 MICROGRAM(S)/KG/HR: 12 PATCH, EXTENDED RELEASE TRANSDERMAL at 05:06

## 2024-11-07 RX ADMIN — Medication 25 GRAM(S): at 05:06

## 2024-11-07 RX ADMIN — Medication 225 MILLILITER(S): at 23:28

## 2024-11-07 RX ADMIN — FLUCONAZOLE 100 MILLIGRAM(S): 200 TABLET ORAL at 01:49

## 2024-11-07 RX ADMIN — DEXMEDETOMIDINE HYDROCHLORIDE 1.15 MICROGRAM(S)/KG/HR: 4 INJECTION, SOLUTION INTRAVENOUS at 05:06

## 2024-11-07 RX ADMIN — CHLORHEXIDINE GLUCONATE 15 MILLILITER(S): 1.2 RINSE ORAL at 05:05

## 2024-11-07 RX ADMIN — Medication 225 MILLILITER(S): at 09:27

## 2024-11-07 RX ADMIN — PANTOPRAZOLE SODIUM 40 MILLIGRAM(S): 40 TABLET, DELAYED RELEASE ORAL at 18:00

## 2024-11-07 RX ADMIN — CHLORHEXIDINE GLUCONATE 1 APPLICATION(S): 1.2 RINSE ORAL at 05:05

## 2024-11-07 RX ADMIN — Medication 25 GRAM(S): at 04:00

## 2024-11-07 RX ADMIN — MEROPENEM 100 MILLIGRAM(S): 500 INJECTION, POWDER, FOR SOLUTION INTRAVENOUS at 05:05

## 2024-11-07 NOTE — PROGRESS NOTE ADULT - SUBJECTIVE AND OBJECTIVE BOX
Today is hospital day 4d. EGD done today. Pending GI recs and notes. Patient is stating well and is intubated. She is on precedex and fentanyl. She spiked a fever overnight and recieved IV tylenol. Patient awake and respondeing to commands while intubated.     11/6- This morning patient was seen and examined at bedside, resting comfortably in bed. Patient was noted to have sat 70s and tachypneic to 40-50 on the HFNC. She was placed back on BIPAP 100%. She was Offered ventilatory support which she emphatically declined. At this time, she was mentating well, but more lethargic than her usual. The risk of failure was explained to her, however she expressed that she wanted a chance to breath before intubating her. Meanwhile family was notified, and the family requested everything be done to keep her alive. Because she was only tachypneic without any accessory muscle use, she was monitored on NIV, on which she began to show improvement.   Patient today morning was stating in 80s on the BiPAP. positional changes were made and she was stating well. But later after a while she was noticed to have high RR with accessory muscles for respiration. Patient was explained the need of intubation. Patient family approached to obtain consent for intubation. Family agrees. Patient intubated. NGT suction draining brown fluid - possible fecal matter. Vascular surgery consulted in suspicion of mesentric ischemia.    HOSPITAL COURSE  73-year-old female PMH A-fib on xarelto,, HTN, s/p cholecystectomy, congenital solitary kidney presents from NH to the ED for evaluation of weakness, decreased PO intake and abdominal distension. Pt's son Everton says pt has not been feeling well the past few days, and has had poor appetite which is unlike her. She's been more tired and weak, unable to get out of bed. Has not had a bowel movement in 3 days, pt unsure if she's been passing gas otherwise. She was noted to be hypotensive and have an elevated wbc count at the NH as well. No dysuria, chest pain, SOB, cough or fever per pt otherwise.  No other complaints currently.     In the ED, BP 66/45 S/P LR 2800cc bolus with no improvement in BP, started on peripheral levo. Labs with wbc 19.29, vbg lactate 2.0-->2.3, pH 7.2, pCO2 45, Na 133, AG 21, Cr 4.3 (baseline 1.1), UA contaminated. CTAP with severe gastric distension, multiple foci of intraperitoneal free air and urinary bladder thickening with free air. Surgery consulted, no intervention for now, NGT placed for gastric decompression with 3.1L feculent output, recommend GI consult.    MEDICATIONS  (STANDING):  chlorhexidine 2% Cloths 1 Application(s) Topical <User Schedule>  dexMEDEtomidine Infusion 0.05 MICROgram(s)/kG/Hr (1.15 mL/Hr) IV Continuous <Continuous>  fentaNYL   Infusion 0.5 MICROgram(s)/kG/Hr (4.58 mL/Hr) IV Continuous <Continuous>  fluconAZOLE IVPB 200 milliGRAM(s) IV Intermittent every 24 hours  lactated ringers. 1000 milliLiter(s) (100 mL/Hr) IV Continuous <Continuous>  meropenem  IVPB      meropenem  IVPB 500 milliGRAM(s) IV Intermittent every 12 hours  norepinephrine Infusion 0.05 MICROgram(s)/kG/Min (8.48 mL/Hr) IV Continuous <Continuous>  pantoprazole  Injectable 40 milliGRAM(s) IV Push daily  potassium chloride  20 mEq/100 mL IVPB 20 milliEquivalent(s) IV Intermittent every 2 hours    MEDICATIONS  (PRN):      Vital Signs Last 24 Hrs  T(C): 38.7 (07 Nov 2024 08:00), Max: 38.7 (07 Nov 2024 08:00)  T(F): 101.6 (07 Nov 2024 08:00), Max: 101.6 (07 Nov 2024 08:00)  HR: 65 (07 Nov 2024 08:55) (64 - 86)  BP: 145/65 (07 Nov 2024 08:30) (90/42 - 151/64)  BP(mean): 93 (07 Nov 2024 08:30) (61 - 99)  RR: 20 (07 Nov 2024 08:30) (14 - 29)  SpO2: 99% (07 Nov 2024 08:55) (98% - 100%)    Parameters below as of 07 Nov 2024 08:00  Patient On (Oxygen Delivery Method): ventilator      Physical Exam:   GENERAL: elderly appearing, Et tube in place  HEENT: Normocephalic, atraumatic  PULMONARY: Clear to auscultation bilaterally. No rales, ronchi, or wheezing.   CARDIOVASCULAR: Regular rate and rhythm, S1-S2, no murmurs   GASTROINTESTINAL: Soft, non-tender, non-distended, no guarding.   SKIN/EXTREMITIES: No LE edema b/l  NEUROLOGIC: AAOX3        LABS:                          8.0    13.27 )-----------( 143      ( 07 Nov 2024 11:35 )             25.5     11-07    156[H]  |  121[H]  |  48[H]  ----------------------------<  106[H]  4.1   |  29  |  1.2    Ca    12.5[H]      07 Nov 2024 06:52  Mg     1.7     11-07    TPro  4.6[L]  /  Alb  2.6[L]  /  TBili  0.6  /  DBili  x   /  AST  8   /  ALT  16  /  AlkPhos  104  11-07    LIVER FUNCTIONS - ( 07 Nov 2024 06:52 )  Alb: 2.6 g/dL / Pro: 4.6 g/dL / ALK PHOS: 104 U/L / ALT: 16 U/L / AST: 8 U/L / GGT: x           PT/INR - ( 06 Nov 2024 22:45 )   PT: 12.30 sec;   INR: 1.04 ratio         PTT - ( 06 Nov 2024 22:45 )  PTT:29.0 sec  Urinalysis Basic - ( 07 Nov 2024 06:52 )    Color: x / Appearance: x / SG: x / pH: x  Gluc: 106 mg/dL / Ketone: x  / Bili: x / Urobili: x   Blood: x / Protein: x / Nitrite: x   Leuk Esterase: x / RBC: x / WBC x   Sq Epi: x / Non Sq Epi: x / Bacteria: x      Urinalysis with Rflx Culture (collected 03 Nov 2024 13:51)    Culture - Stool (collected 03 Nov 2024 13:51)  Source: .Stool  Preliminary Report (04 Nov 2024 21:38):    No enteric pathogens to date: Final culture pending    Culture - Blood (collected 03 Nov 2024 11:10)  Source: .Blood BLOOD  Preliminary Report (04 Nov 2024 18:01):    No growth at 24 hours    Culture - Blood (collected 03 Nov 2024 11:10)  Source: .Blood BLOOD  Preliminary Report (04 Nov 2024 18:01):    No growth at 24 hours      IMAGING    Prior EGD/ Colonoscopy:  < from: EGD w/ PEG Placement (02.16.23 @ 10:30) >  Impressions:    Grade D esophagitis compatible with erosive esophagitis.    Erythema in the stomach compatible with non-erosive gastritis.    Hiatal Hernia.    Normal mucosa in the whole examined duodenum.    < end of copied text >    US ABDOMEN RT UPR QUADRANT  IMPRESSION:  Status post cholecystectomy. Heterogeneous poorly visualized liver.   Previously noted portal venous air on prior CAT scan not well appreciated   on this limited exam.    XR CHEST PORTABLE ROUTINE 1V  IMPRESSION:    1. Unchanged bilateral opacities/pleural effusions.    ECHO- 02/23  Summary:   1. Left ventricular ejection fraction, by visual estimation, is >70%.   2. Hyperdynamic global left ventricular systolic function.   3. The left ventricular diastolic function could not be assessed in this   study.   4. Left atrial enlargement.   5. Calcified aortic valve with normal opening.   6. Mild tricuspid regurgitation.   7. Estimated pulmonary artery systolic pressure is 39.5 mmHg assuming a   right atrial pressure of 3 mmHg, which is consistent with borderline   pulmonary hypertension.    ECHO  Summary:   1. Technically difficult study.   2. Hyperdynamic global left ventricular systolic function with a biplane   EF of 71%. Indeterminate diastolic function. Suboptimal endocardial   visualization; patient refused echocontrast.   3. Grossly normal right ventricular size and function.   4. Left atrial enlargement by visual assessment.   5. Fibrocalcific aortic valve with mild to moderate aortic valve   stenosis (Vmax 2.99m/s, mean PG 21mmHg, DI 0.59, DONNA 1.76cm2). Note that   gradients may be increased by hyperdynamic left ventricular function.   6. Adequate TR velocity was not obtained to accurately assess RVSP.   7. Trivial pericardial effusion.      CT ABDOMEN AND PELVIS    IMPRESSION:    1. Severe gastric distention extending to the level of the first/second   portion of duodenum, with new pneumatosis along the posterior gastric   wall, suspicious for ischemia; multiple adjacent foci of free   intraperitoneal air are noted.  2. New small linear foci of air within the peripheral liver, suspicious   for portal venous gas.  3. Thick-walled urinary bladder containing multiple foci of air; findings   may be related to recent intervention and/or underlying cystitis, however   however please note colovesicular fistula can have this appearance.   Clinical correlation is suggested.  4. Small bilateral pleural effusions with small bibasilar   opacities/atelectasis.               Today is hospital day 4d. EGD done today. Pending GI recs and notes. Patient is stating well and is intubated. She is on precedex and fentanyl. She spiked a fever overnight and recieved IV tylenol. Patient awake and respondeing to commands while intubated. Vascular surgery following and requests for CT angio of the abdomen and pelvis. Awaited kidney function improvement.     11/6- This morning patient was seen and examined at bedside, resting comfortably in bed. Patient was noted to have sat 70s and tachypneic to 40-50 on the HFNC. She was placed back on BIPAP 100%. She was Offered ventilatory support which she emphatically declined. At this time, she was mentating well, but more lethargic than her usual. The risk of failure was explained to her, however she expressed that she wanted a chance to breath before intubating her. Meanwhile family was notified, and the family requested everything be done to keep her alive. Because she was only tachypneic without any accessory muscle use, she was monitored on NIV, on which she began to show improvement.   Patient today morning was stating in 80s on the BiPAP. positional changes were made and she was stating well. But later after a while she was noticed to have high RR with accessory muscles for respiration. Patient was explained the need of intubation. Patient family approached to obtain consent for intubation. Family agrees. Patient intubated. NGT suction draining brown fluid - possible fecal matter. Vascular surgery consulted in suspicion of mesentric ischemia.    HOSPITAL COURSE  73-year-old female PMH A-fib on xarelto,, HTN, s/p cholecystectomy, congenital solitary kidney presents from NH to the ED for evaluation of weakness, decreased PO intake and abdominal distension. Pt's son Everton says pt has not been feeling well the past few days, and has had poor appetite which is unlike her. She's been more tired and weak, unable to get out of bed. Has not had a bowel movement in 3 days, pt unsure if she's been passing gas otherwise. She was noted to be hypotensive and have an elevated wbc count at the NH as well. No dysuria, chest pain, SOB, cough or fever per pt otherwise.  No other complaints currently.     In the ED, BP 66/45 S/P LR 2800cc bolus with no improvement in BP, started on peripheral levo. Labs with wbc 19.29, vbg lactate 2.0-->2.3, pH 7.2, pCO2 45, Na 133, AG 21, Cr 4.3 (baseline 1.1), UA contaminated. CTAP with severe gastric distension, multiple foci of intraperitoneal free air and urinary bladder thickening with free air. Surgery consulted, no intervention for now, NGT placed for gastric decompression with 3.1L feculent output, recommend GI consult.    MEDICATIONS  (STANDING):  chlorhexidine 2% Cloths 1 Application(s) Topical <User Schedule>  dexMEDEtomidine Infusion 0.05 MICROgram(s)/kG/Hr (1.15 mL/Hr) IV Continuous <Continuous>  fentaNYL   Infusion 0.5 MICROgram(s)/kG/Hr (4.58 mL/Hr) IV Continuous <Continuous>  fluconAZOLE IVPB 200 milliGRAM(s) IV Intermittent every 24 hours  lactated ringers. 1000 milliLiter(s) (100 mL/Hr) IV Continuous <Continuous>  meropenem  IVPB      meropenem  IVPB 500 milliGRAM(s) IV Intermittent every 12 hours  norepinephrine Infusion 0.05 MICROgram(s)/kG/Min (8.48 mL/Hr) IV Continuous <Continuous>  pantoprazole  Injectable 40 milliGRAM(s) IV Push daily  potassium chloride  20 mEq/100 mL IVPB 20 milliEquivalent(s) IV Intermittent every 2 hours    MEDICATIONS  (PRN):      Vital Signs Last 24 Hrs  T(C): 38.7 (07 Nov 2024 08:00), Max: 38.7 (07 Nov 2024 08:00)  T(F): 101.6 (07 Nov 2024 08:00), Max: 101.6 (07 Nov 2024 08:00)  HR: 65 (07 Nov 2024 08:55) (64 - 86)  BP: 145/65 (07 Nov 2024 08:30) (90/42 - 151/64)  BP(mean): 93 (07 Nov 2024 08:30) (61 - 99)  RR: 20 (07 Nov 2024 08:30) (14 - 29)  SpO2: 99% (07 Nov 2024 08:55) (98% - 100%)    Parameters below as of 07 Nov 2024 08:00  Patient On (Oxygen Delivery Method): ventilator      Physical Exam:   GENERAL: elderly appearing, Et tube in place  HEENT: Normocephalic, atraumatic  PULMONARY: Clear to auscultation bilaterally. No rales, ronchi, or wheezing.   CARDIOVASCULAR: Regular rate and rhythm, S1-S2, no murmurs   GASTROINTESTINAL: Soft, non-tender, non-distended, no guarding.   SKIN/EXTREMITIES: No LE edema b/l  NEUROLOGIC: AAOX3        LABS:                          8.0    13.27 )-----------( 143      ( 07 Nov 2024 11:35 )             25.5     11-07    156[H]  |  121[H]  |  48[H]  ----------------------------<  106[H]  4.1   |  29  |  1.2    Ca    12.5[H]      07 Nov 2024 06:52  Mg     1.7     11-07    TPro  4.6[L]  /  Alb  2.6[L]  /  TBili  0.6  /  DBili  x   /  AST  8   /  ALT  16  /  AlkPhos  104  11-07    LIVER FUNCTIONS - ( 07 Nov 2024 06:52 )  Alb: 2.6 g/dL / Pro: 4.6 g/dL / ALK PHOS: 104 U/L / ALT: 16 U/L / AST: 8 U/L / GGT: x           PT/INR - ( 06 Nov 2024 22:45 )   PT: 12.30 sec;   INR: 1.04 ratio         PTT - ( 06 Nov 2024 22:45 )  PTT:29.0 sec  Urinalysis Basic - ( 07 Nov 2024 06:52 )    Color: x / Appearance: x / SG: x / pH: x  Gluc: 106 mg/dL / Ketone: x  / Bili: x / Urobili: x   Blood: x / Protein: x / Nitrite: x   Leuk Esterase: x / RBC: x / WBC x   Sq Epi: x / Non Sq Epi: x / Bacteria: x      Urinalysis with Rflx Culture (collected 03 Nov 2024 13:51)    Culture - Stool (collected 03 Nov 2024 13:51)  Source: .Stool  Preliminary Report (04 Nov 2024 21:38):    No enteric pathogens to date: Final culture pending    Culture - Blood (collected 03 Nov 2024 11:10)  Source: .Blood BLOOD  Preliminary Report (04 Nov 2024 18:01):    No growth at 24 hours    Culture - Blood (collected 03 Nov 2024 11:10)  Source: .Blood BLOOD  Preliminary Report (04 Nov 2024 18:01):    No growth at 24 hours      IMAGING    Prior EGD/ Colonoscopy:  < from: EGD w/ PEG Placement (02.16.23 @ 10:30) >  Impressions:    Grade D esophagitis compatible with erosive esophagitis.    Erythema in the stomach compatible with non-erosive gastritis.    Hiatal Hernia.    Normal mucosa in the whole examined duodenum.    < end of copied text >    US ABDOMEN RT UPR QUADRANT  IMPRESSION:  Status post cholecystectomy. Heterogeneous poorly visualized liver.   Previously noted portal venous air on prior CAT scan not well appreciated   on this limited exam.    XR CHEST PORTABLE ROUTINE 1V  IMPRESSION:    1. Unchanged bilateral opacities/pleural effusions.    ECHO- 02/23  Summary:   1. Left ventricular ejection fraction, by visual estimation, is >70%.   2. Hyperdynamic global left ventricular systolic function.   3. The left ventricular diastolic function could not be assessed in this   study.   4. Left atrial enlargement.   5. Calcified aortic valve with normal opening.   6. Mild tricuspid regurgitation.   7. Estimated pulmonary artery systolic pressure is 39.5 mmHg assuming a   right atrial pressure of 3 mmHg, which is consistent with borderline   pulmonary hypertension.    ECHO  Summary:   1. Technically difficult study.   2. Hyperdynamic global left ventricular systolic function with a biplane   EF of 71%. Indeterminate diastolic function. Suboptimal endocardial   visualization; patient refused echocontrast.   3. Grossly normal right ventricular size and function.   4. Left atrial enlargement by visual assessment.   5. Fibrocalcific aortic valve with mild to moderate aortic valve   stenosis (Vmax 2.99m/s, mean PG 21mmHg, DI 0.59, DONNA 1.76cm2). Note that   gradients may be increased by hyperdynamic left ventricular function.   6. Adequate TR velocity was not obtained to accurately assess RVSP.   7. Trivial pericardial effusion.      CT ABDOMEN AND PELVIS    IMPRESSION:    1. Severe gastric distention extending to the level of the first/second   portion of duodenum, with new pneumatosis along the posterior gastric   wall, suspicious for ischemia; multiple adjacent foci of free   intraperitoneal air are noted.  2. New small linear foci of air within the peripheral liver, suspicious   for portal venous gas.  3. Thick-walled urinary bladder containing multiple foci of air; findings   may be related to recent intervention and/or underlying cystitis, however   however please note colovesicular fistula can have this appearance.   Clinical correlation is suggested.  4. Small bilateral pleural effusions with small bibasilar   opacities/atelectasis.

## 2024-11-07 NOTE — DIETITIAN INITIAL EVALUATION ADULT - OTHER INFO
Pertinent Medical Information: Per H&P, pt is a 73-year-old female w/ PMH A-fib on xarelto, HTN, s/p cholecystectomy, congenital solitary kidney presents from NH to the ED for evaluation of weakness, decreased PO intake and abdominal distension. s/p intubation, Ve 6.72, Tmax 38.7, not on propofol. s/p EGD on 11/7. NGT placed to suction.   # Septic shock 2/2 cystitis vs intraabdominal infection  # pneumoperitoneum  - NGT placed for gastric decompression with 3.1L feculent output - intermittent suction  - Keep NPO   - NGT to intermittent suction   # leukocytosis sepsis / SBO???    Per GI note on 11/6:  S/P PEG 2/2023 post intubation, removed in NH    Weight hx: UBW unknown. Current dosing weight is 91.6 KG. Previous admission weight was 90.7 KG on 3/16/23 per EMR. 0.98% unintentional weight gain in over 1 year compared to current dosing weight. Pt does not meet weight criteria for malnutrition at this time.

## 2024-11-07 NOTE — PROGRESS NOTE ADULT - ASSESSMENT
IMPRESSION:    Acute hypoxemic respiratory failure  Septic shock  Acute hypoxemic respiratory failure   UTI  Severe gastric distension w/ pneumatosis along posterior gastric wall.  RO GOO   Possible aspiration   Intraperitoneal free air  Thrombocytopenia   HAGMA  NOLA non oliguric  Afib on Xarelto  HO HTN  HO ESBL Proteus    PLAN:    CNS: SAT.  Sam control as needed     HEENT: Oral care.  ET care     PULMONARY:  HOB @ 45 degrees.  Aspiration precautions. Wean o2 as tolerated.  Wean FiO2.  PEEP 10.  Monitor airway pressures.      CARDIOVASCULAR: TTE.  Goal directed fluid resuscitation fluid deplete.   Trend LA.  D5W to correct free H2O deficit.      GI: GI prophylaxis.  NPO w/ NGT to suction.  Surgery following.  GI eval noted noted.  Possible EGD today.  Possible repeat CTA in am.      RENAL:  Follow up lytes.  Correct as needed.  Strict intake and output.  Burger for is and Os     INFECTIOUS DISEASE: Follow up cultures.  Meropenem and Fluconazole.  ID eval noted.  MRSA nares neg.      HEMATOLOGICAL:  DVT prophylaxis.  Hold full dose anticoagulation.  Monitor CBC and coags.  HIT and DIC      ENDOCRINE:  Follow up FS.  Insulin protocol if needed.    MUSCULOSKELETAL: Bed chair position.  Off loading    Full code    MICU    DC TLC

## 2024-11-07 NOTE — DIETITIAN INITIAL EVALUATION ADULT - OTHER CALCULATIONS
Using ABW: ENERGY: PSE 1780.17 kcal/day vs. 6882-3739 kcal/day (11-14 kcal/kg); PROTEIN:  g/day (1.5-2 g/kg IBW 59 KG); FLUID: 4926-4523 mL/day (25-30 mL/kg) -- with consideration for age, BMI, intubated not on propofol

## 2024-11-07 NOTE — DIETITIAN INITIAL EVALUATION ADULT - REASON
Unable to perform NFPE at time of visit; intubated. Will attempt to perform NFPE at follow up as able.

## 2024-11-07 NOTE — DIETITIAN INITIAL EVALUATION ADULT - [id number]_GEN_A_CORE
No BMs documented this admit; GI: WDL, obese; distended per flow sheet     11-06-24 @ 07:01  -  11-07-24 @ 07:00  --------------------------------------------------------  OUT:    Nasogastric/Oral tube (mL): 950 mL  Total OUT: 950 mL    Total NET: -950 mL

## 2024-11-07 NOTE — PROGRESS NOTE ADULT - ASSESSMENT
73-year-old female PMH A-fib on xarelto,, HTN, s/p cholecystectomy, congenital solitary kidney presents from NH to the ED for evaluation of weakness, decreased PO intake and abdominal distension. Admitted for sepstic shock.    #Septic shock 2/2 cystitis vs intraabdominal infection  #HAGMA 2/2 lactic acidosis and uremia  - ED, BP 66/45 S/P LR 2800cc bolus with no improvement in BP  - wbc 19.29, vbg lactate 2.0-->2.3, pH 7.2, pCO2 45, Na 133, AG 21, Cr 4.3 (baseline 1.1),  - started on peripheral levo in the ED.  - Patient was tachypneic with high pco2 hence placed on BiPAP  - Ct abdomen/pelvis noted  -s/p cefepime and Vanc in the ED  - started on meropenem as per ID recs given UCx positive for ESBL in the past >> increased to 1g meropenem with the improvement of kidney function.  - started flucanazole 400mg loading dose and flucanzole 200mg q24 daily  - Trend lactate, ABG, AG  - Fever curve  - Lactate trend 2.3>>3.2>>1.6>>1.1- WNL  - AG 21 >>32>>26- resolving    11/5-   - weaned down to 0.15 levophed now- MAP stabilised - MAP-81  - BIPAP to HFNC 60/60 today- patient stating well.  - started on LR @100cc/hr     11/6  - On levophed 0.15. MAP stable  - Patient intubated     #Acute hypoxic respiratory failure  - Patient initially on BiPAP with only signs of tachypenia  - Overnight stating in 70s  - Lactate: 1.6> 1.1  11/5   - pH,  7.43 pCO2, 34 pO2, - 232 HCO3-  23FIO2- 100.0  - switched to HFNC.      11/6  - pH,  7.41 pCO2, 34 pO2, - 65 HCO3-  22 FIO2- 100.0  - Patient intubated     11/7  - improved ABG  - stating well with intubation    #NOLA likely ATN 2/2 septic shock  - Baseline creatinine - 1.1  - CR on adm- 4.4  - started on LR @100cc/hr     Nephro consulted  -  Cr 4.3 > 4.2, Cr 1.4 on 11/1 and 1.0 on 7/5/24  - strict Is&Os  - daily BMP trend Cr correct lytes as needed  - f/u Ucx and Bcx   - obtain Urine studies  - c/w levophed, give fluids   - dose meropenem and flucanazole as eGFR of 30      #Abdominal distention   #pneumoperitoneum  - CT noted  - NGT placed for gastric decompression with 3.1L feculent output - intermittent suction  - GI consulted to expedite the scope if necessary  - c/w abx   - Keep NPO   - NGT to intermittent suction   - Further stabilisation for EGD  - Vascular consulted    11/5  - NGT tube draining - feculent output    #Afib on xarelto  - Hold xarelto given the NOLA  - switch to heparin gtt given CrCl <30  - Hold AC until GI recs    #HTN  - on home - coozar 100 and nefedepine 60  - hold home BP meds    MISC    #DVT prophylaxis: None for now SCDs- Hold ac if GI wants to scope  #GI prophylaxis: PPI  #Diet: NPO for now  #Activity: out of bed to chair  #Code status: Full Code  #Disposition: Acute    #Handoff  - f/u GI recs  - f/u vascular recs  - Pending CT angio abdomen and pelvis - for improvement in creatinine   73-year-old female PMH A-fib on xarelto,, HTN, s/p cholecystectomy, congenital solitary kidney presents from NH to the ED for evaluation of weakness, decreased PO intake and abdominal distension. Admitted for sepstic shock.    #Septic shock 2/2 cystitis vs intraabdominal infection  #HAGMA 2/2 lactic acidosis and uremia  - ED, BP 66/45 S/P LR 2800cc bolus with no improvement in BP  - wbc 19.29, vbg lactate 2.0-->2.3, pH 7.2, pCO2 45, Na 133, AG 21, Cr 4.3 (baseline 1.1),  - started on peripheral levo in the ED.  - Patient was tachypneic with high pco2 hence placed on BiPAP  - Ct abdomen/pelvis noted  -s/p cefepime and Vanc in the ED  - started on meropenem as per ID recs given UCx positive for ESBL in the past >> increased to 1g meropenem with the improvement of kidney function.  - started flucanazole 400mg loading dose and flucanzole 200mg q24 daily  - Trend lactate, ABG, AG  - Fever curve  - Lactate trend 2.3>>3.2>>1.6>>1.1>>0.8- WNL  - AG 21 >>32>>26- closed    11/5-   - weaned down to 0.15 levophed now- MAP stabilised - MAP-81  - BIPAP to HFNC 60/60 today- patient stating well.  - started on LR @100cc/hr     11/6  - On levophed 0.15. MAP stable  - Patient intubated     #Acute hypoxic respiratory failure  - Patient initially on BiPAP with only signs of tachypenia  - Overnight stating in 70s  - Lactate: 1.6> 1.1  11/5   - pH,  7.43 pCO2, 34 pO2, - 232 HCO3-  23FIO2- 100.0  - switched to HFNC.      11/6  - pH,  7.41 pCO2, 34 pO2, - 65 HCO3-  22 FIO2- 100.0  - Patient intubated     11/7  - improved ABG  - stating well with intubation    #NOLA likely ATN 2/2 septic shock  - Baseline creatinine - 1.1  - CR on adm- 4.4  - started on LR @100cc/hr     Nephro consulted  -  Cr 4.3 > 4.2, Cr 1.4 on 11/1 and 1.0 on 7/5/24  - strict Is&Os  - daily BMP trend Cr correct lytes as needed  - f/u Ucx and Bcx   - obtain Urine studies  - c/w levophed, give fluids   - dose meropenem and flucanazole as eGFR of 30      #Abdominal distention   #pneumoperitoneum  - CT noted  - NGT placed for gastric decompression with 3.1L feculent output - intermittent suction  - GI consulted to expedite the scope if necessary  - c/w abx   - Keep NPO   - NGT to intermittent suction   - Further stabilisation for EGD  - Vascular consulted    11/5  - NGT tube draining - feculent output    11/7  - EGD done    Multiple clean based small ulcers were seen in the esophagus likely from NGT trauma. .  	10 cm Ulcer in the stomach body (Greater curvature). (Biopsy).  	Ulcers in the antrum and stomach body.  	Erosions in the stomach compatible with erosive gastritis. (Biopsy).  	Normal mucosa in the whole examined duodenum.    PLAN   - Repeat CT with Gastrografin tomorrow.    - PPI IV BID    - Repeat EGD in two months.    - Will follow.    #Afib on xarelto  - Hold xarelto given the NOLA  - switch to heparin gtt given CrCl <30  - Hold AC until GI recs    #HTN  - on home - coozar 100 and nefedepine 60  - hold home BP meds    MISC    #DVT prophylaxis: None for now SCDs- Hold ac if GI wants to scope  #GI prophylaxis: PPI  #Diet: NPO for now  #Activity: out of bed to chair  #Code status: Full Code  #Disposition: Acute    #Handoff  - f/u CT abdomen and pelvis with oral gastrogriffin  - f/u vascular recs  - Pending CT angio abdomen and pelvis - for improvement in creatinine

## 2024-11-07 NOTE — CHART NOTE - NSCHARTNOTEFT_GEN_A_CORE
Multiple clean based small ulcers were seen in the esophagus likely from NGT trauma. .  	10 cm Ulcer in the stomach body (Greater curvature). (Biopsy).  	Ulcers in the antrum and stomach body.  	Erosions in the stomach compatible with erosive gastritis. (Biopsy).  	Normal mucosa in the whole examined duodenum.    PLAN   - Repeat CT with Gastrografin tomorrow.   - PPI IV BID   - Repeat EGD in two months.   - Will follow.

## 2024-11-07 NOTE — PROGRESS NOTE ADULT - ASSESSMENT
ASSESSMENT:  73-year-old female PMH A-fib on xarelto,  HTN, s/p cholecystectomy, congenital solitary kidney presents from NH to the ED for evaluation of weakness, decreased PO intake and abdominal distension. Found to have severe gastric distension with emphysematous changes, NG tube placed in ED with 3.1L initial output. Admitted to medical services for workup, actively resuscitated and decompressed via NG tube. Surgery consulted for gastric distension and air within gastric wall      PLAN:  - No acute surgical intervention   - f/u GI for EGD   - c/w NGT in place to suction. Monitor output

## 2024-11-07 NOTE — DIETITIAN INITIAL EVALUATION ADULT - ADD RECOMMEND
1. SBO per Nephrology MD note?? If pt not able to tolerate any enteral nutrition, recommend TPN/PPN.  2. If pt remains intubated and able does not have SBO, recommend Jevity 1.2 via route per team, continuous 18hr feeds, total volume: 1350 mL, start rate at 25 mL/hr and increase as tolerated until goal rate of 75 mL/hr is met. ADD no carb prosource 2X/DAILY -- provides 120 kcal, 30g pro total. TF regimen + no carb prosource to provide -- 1740 kcal, 105g pro, 1089 mL free water from formula + additional water flushes of 50 mL q4hrs -- team to adjust free water flushes prn.     High risk f/u

## 2024-11-07 NOTE — DIETITIAN INITIAL EVALUATION ADULT - NAME AND PHONE
Jennifer [phone] or TEAMS    Nutrition Intervention: TF regimen; Nutrition Monitoring: Diet order, weights, labs, NFPF, body composition, BM and tolerance to TF regimen

## 2024-11-07 NOTE — PROGRESS NOTE ADULT - SUBJECTIVE AND OBJECTIVE BOX
GENERAL SURGERY PROGRESS NOTE     Patient: PATRICIA SPRAGUE , 73y ([birth year])Female   MRN: [id number]  Location: [address]  Visit: 11-03-24 Inpatient  Date: 11-07-24 @ 10:35        Admitted :11-03-24 (4d)  LOS: 4d    Procedure/Dx/Injuries: Sepsis    Sepsis due to urinary tract infection         Events of past 24 hours: Patient seen and examined at bedside. Patient is intubated with at 50/12 and sedated on precedex 0.6 and fentanyl 0.5,   >>> <<<>>> <<<>>> <<<>>> <<<>>> <<<>>> <<<>>> <<<>>> <<<>>> <<<>>> <<<    Vitals:   T(F): 101.6 (11-07-24 @ 08:00), Max: 101.6 (11-07-24 @ 08:00)  HR: 65 (11-07-24 @ 08:55)  BP: 145/65 (11-07-24 @ 08:30)  RR: 20 (11-07-24 @ 08:30)  SpO2: 99% (11-07-24 @ 08:55)  Mode: AC/ CMV (Assist Control/ Continuous Mandatory Ventilation), RR (machine): 14, TV (machine): 380, FiO2: 40, PEEP: 10, ITime: 0.9, MAP: 17, PIP: 34    PHYSICAL EXAM:  General Appearance: Intubated  HEENT: Normocephalic, atraumatic  Heart: s1, s2,    Lungs: No increased work of breathing or accessory muscle use. Symmetric chest wall rise and fall. On vent 60/12  Abdomen:  Soft, nontender, nondistended. No rebound or guarding.  MSK/Extremities: Warm & well-perfused.   Skin: Warm, dry. No jaundice.   >>> <<<>>> <<<>>> <<<>>> <<<>>> <<<>>> <<<>>> <<<>>> <<<>>> <<<>>> <<<   Is & Os:   Diet, NPO:   Except Medications    Fluids:     11-06-24 @ 07:01  -  11-07-24 @ 07:00  --------------------------------------------------------  IN:    Dexmedetomidine: 464.7 mL    dextrose 5% + sodium chloride 0.9%: 640 mL    FentaNYL: 270.7 mL    IV PiggyBack: 700 mL    Lactated Ringers: 1700 mL    Norepinephrine: 21.1 mL  Total IN: 3796.5 mL    OUT:    Indwelling Catheter - Urethral (mL): 2700 mL    Nasogastric/Oral tube (mL): 950 mL  Total OUT: 3650 mL    Total NET: 146.5 mL    >>> <<<>>> <<<>>> <<<>>> <<<>>> <<<>>> <<<>>> <<<>>> <<<>>> <<<>>> <<<    MEDICATIONS  (STANDING):  chlorhexidine 0.12% Liquid 15 milliLiter(s) Oral Mucosa every 12 hours  chlorhexidine 2% Cloths 1 Application(s) Topical <User Schedule>  dexMEDEtomidine Infusion 0.05 MICROgram(s)/kG/Hr (1.15 mL/Hr) IV Continuous <Continuous>  dextrose 5%. 1000 milliLiter(s) (225 mL/Hr) IV Continuous <Continuous>  fentaNYL   Infusion 0.5 MICROgram(s)/kG/Hr (4.58 mL/Hr) IV Continuous <Continuous>  fluconAZOLE IVPB 200 milliGRAM(s) IV Intermittent every 24 hours  meropenem  IVPB 1000 milliGRAM(s) IV Intermittent every 12 hours  norepinephrine Infusion 0.05 MICROgram(s)/kG/Min (8.48 mL/Hr) IV Continuous <Continuous>  pantoprazole  Injectable 40 milliGRAM(s) IV Push daily    MEDICATIONS  (PRN):      DVT PROPHYLAXIS:   GI PROPHYLAXIS: pantoprazole  Injectable 40 milliGRAM(s) IV Push daily    ANTICOAGULATION:   ANTIBIOTICS:  fluconAZOLE IVPB 200 milliGRAM(s)  meropenem  IVPB 1000 milliGRAM(s)      >>> <<<>>> <<<>>> <<<>>> <<<>>> <<<>>> <<<>>> <<<>>> <<<>>> <<<>>> <<<        LAB/STUDIES:  Labs:  CAPILLARY BLOOD GLUCOSE                              8.5    13.43 )-----------( 140      ( 07 Nov 2024 06:52 )             26.5       Auto Neutrophil %: 79.4 % (11-07-24 @ 06:52)  Auto Immature Granulocyte %: 1.0 % (11-07-24 @ 06:52)  Auto Neutrophil %: 80.7 % (11-06-24 @ 22:45)  Auto Immature Granulocyte %: 1.1 % (11-06-24 @ 22:45)    11-07    156[H]  |  121[H]  |  48[H]  ----------------------------<  106[H]  4.1   |  29  |  1.2      Calcium: 12.5 mg/dL (11-07-24 @ 06:52)      LFTs:             4.6  | 0.6  | 8        ------------------[104     ( 07 Nov 2024 06:52 )  2.6  | x    | 16          Lipase:x      Amylase:x         Blood Gas Arterial, Lactate: 0.8 mmol/L (11-07-24 @ 03:05)  Blood Gas Arterial, Lactate: 1.0 mmol/L (11-06-24 @ 12:38)  Blood Gas Arterial, Lactate: 1.1 mmol/L (11-06-24 @ 00:29)  Blood Gas Arterial, Lactate: 1.6 mmol/L (11-05-24 @ 22:50)  Lactate, Blood: 1.5 mmol/L (11-04-24 @ 16:58)  Blood Gas Arterial, Lactate: 1.0 mmol/L (11-04-24 @ 13:35)    ABG - ( 07 Nov 2024 03:05 )  pH: 7.44  /  pCO2: 40    /  pO2: 146   / HCO3: 27    / Base Excess: 2.8   /  SaO2: 96.2            ABG - ( 06 Nov 2024 12:38 )  pH: 7.44  /  pCO2: 38    /  pO2: 195   / HCO3: 26    / Base Excess: 1.6   /  SaO2: 96.3            ABG - ( 06 Nov 2024 00:29 )  pH: 7.43  /  pCO2: 34    /  pO2: 232   / HCO3: 23    / Base Excess: -1.3  /  SaO2: 96.3              Coags:     12.30  ----< 1.04    ( 06 Nov 2024 22:45 )     29.0                Urinalysis Basic - ( 07 Nov 2024 06:52 )    Color: x / Appearance: x / SG: x / pH: x  Gluc: 106 mg/dL / Ketone: x  / Bili: x / Urobili: x   Blood: x / Protein: x / Nitrite: x   Leuk Esterase: x / RBC: x / WBC x   Sq Epi: x / Non Sq Epi: x / Bacteria: x

## 2024-11-07 NOTE — PROGRESS NOTE ADULT - SUBJECTIVE AND OBJECTIVE BOX
Patient is a 73y old  Female who presents with a chief complaint of septic shock (04 Nov 2024 14:32)        Over Night Events:  Remains critically ill on MV.  Sedated.  off pressors.  for EGD today         ROS:     All ROS are negative except HPI         PHYSICAL EXAM    ICU Vital Signs Last 24 Hrs  T(C): 38.7 (07 Nov 2024 08:00), Max: 38.7 (07 Nov 2024 08:00)  T(F): 101.6 (07 Nov 2024 08:00), Max: 101.6 (07 Nov 2024 08:00)  HR: 64 (07 Nov 2024 08:30) (64 - 98)  BP: 145/65 (07 Nov 2024 08:30) (90/42 - 151/64)  BP(mean): 93 (07 Nov 2024 08:30) (61 - 99)  ABP: --  ABP(mean): --  RR: 20 (07 Nov 2024 08:30) (13 - 39)  SpO2: 100% (07 Nov 2024 08:30) (98% - 100%)    O2 Parameters below as of 07 Nov 2024 08:00  Patient On (Oxygen Delivery Method): ventilator            CONSTITUTIONAL:  Ill appearing in  NAD    ENT:   Airway patent,   Mouth with normal mucosa.   ET     EYES:   Pupils equal,   Round and reactive to light.    CARDIAC:   Normal rate,   Regular rhythm.      RESPIRATORY:   No wheezing  Bilateral BS  Normal chest expansion  Not tachypneic,  No use of accessory muscles    GASTROINTESTINAL:  Abdomen soft,   Non-tender,   No guarding,   + BS    MUSCULOSKELETAL:   No clubbing, cyanosis    NEUROLOGICAL:   Sedated  Follows commands     SKIN:   Skin normal color for race,   Warm and dry    No evidence of rash.      11-06-24 @ 07:01  -  11-07-24 @ 07:00  --------------------------------------------------------  IN:    Dexmedetomidine: 464.7 mL    dextrose 5% + sodium chloride 0.9%: 640 mL    FentaNYL: 270.7 mL    IV PiggyBack: 700 mL    Lactated Ringers: 1700 mL    Norepinephrine: 21.1 mL  Total IN: 3796.5 mL    OUT:    Indwelling Catheter - Urethral (mL): 2700 mL    Nasogastric/Oral tube (mL): 950 mL  Total OUT: 3650 mL    Total NET: 146.5 mL      11-07-24 @ 07:01  -  11-07-24 @ 08:51  --------------------------------------------------------  IN:    dextrose 5% + sodium chloride 0.9%: 160 mL    FentaNYL: 18 mL    Free Water: 400 mL  Total IN: 578 mL    OUT:    Indwelling Catheter - Urethral (mL): 250 mL  Total OUT: 250 mL    Total NET: 328 mL          LABS:                            8.5    13.43 )-----------( 140      ( 07 Nov 2024 06:52 )             26.5                                               11-07             8.5    13.43 )-----------( 140      ( 11-07 @ 06:52 )             26.5                8.3    12.22 )-----------( 139      ( 11-06 @ 22:45 )             25.8                9.1    23.17 )-----------( 181      ( 11-06 @ 06:10 )             27.3                10.2   19.43 )-----------( 189      ( 11-05 @ 05:15 )             30.1           156[H]  |  121[H]  |  48[H]  ----------------------------<  106[H]  4.1   |  29  |  1.2    Ca    12.5[H]      07 Nov 2024 06:52  Mg     1.7     11-07    TPro  4.6[L]  /  Alb  2.6[L]  /  TBili  0.6  /  DBili  x   /  AST  8   /  ALT  16  /  AlkPhos  104  11-07      PT/INR - ( 06 Nov 2024 22:45 )   PT: 12.30 sec;   INR: 1.04 ratio         PTT - ( 06 Nov 2024 22:45 )  PTT:29.0 sec                                       Urinalysis Basic - ( 07 Nov 2024 06:52 )    Color: x / Appearance: x / SG: x / pH: x  Gluc: 106 mg/dL / Ketone: x  / Bili: x / Urobili: x   Blood: x / Protein: x / Nitrite: x   Leuk Esterase: x / RBC: x / WBC x   Sq Epi: x / Non Sq Epi: x / Bacteria: x                                                  LIVER FUNCTIONS - ( 07 Nov 2024 06:52 )  Alb: 2.6 g/dL / Pro: 4.6 g/dL / ALK PHOS: 104 U/L / ALT: 16 U/L / AST: 8 U/L / GGT: x                                                                                               Mode: AC/ CMV (Assist Control/ Continuous Mandatory Ventilation)  RR (machine): 14  TV (machine): 380  FiO2: 50  PEEP: 12  ITime: 0.9  MAP: 17  PIP: 34                                      ABG - ( 07 Nov 2024 03:05 )  pH, Arterial: 7.44  pH, Blood: x     /  pCO2: 40    /  pO2: 146   / HCO3: 27    / Base Excess: 2.8   /  SaO2: 96.2                MEDICATIONS  (STANDING):  chlorhexidine 0.12% Liquid 15 milliLiter(s) Oral Mucosa every 12 hours  chlorhexidine 2% Cloths 1 Application(s) Topical <User Schedule>  dexMEDEtomidine Infusion 0.05 MICROgram(s)/kG/Hr (1.15 mL/Hr) IV Continuous <Continuous>  dextrose 5% + sodium chloride 0.9%. 1000 milliLiter(s) (80 mL/Hr) IV Continuous <Continuous>  fentaNYL   Infusion 0.5 MICROgram(s)/kG/Hr (4.58 mL/Hr) IV Continuous <Continuous>  fluconAZOLE IVPB 200 milliGRAM(s) IV Intermittent every 24 hours  magnesium sulfate  IVPB 2 Gram(s) IV Intermittent every 2 hours  meropenem  IVPB 500 milliGRAM(s) IV Intermittent every 12 hours  meropenem  IVPB      norepinephrine Infusion 0.05 MICROgram(s)/kG/Min (8.48 mL/Hr) IV Continuous <Continuous>  pantoprazole  Injectable 40 milliGRAM(s) IV Push daily    MEDICATIONS  (PRN):      New X-rays reviewed:                                                                                  ECHO

## 2024-11-07 NOTE — PROGRESS NOTE ADULT - SUBJECTIVE AND OBJECTIVE BOX
Nephrology progress note    Patient is seen and examined, events over the last 24 h noted .  Intubated on MV     Allergies:  No Known Allergies    Hospital Medications:   MEDICATIONS  (STANDING):  dexMEDEtomidine Infusion 0.05 MICROgram(s)/kG/Hr (1.15 mL/Hr) IV Continuous <Continuous>  dextrose 5%. 1000 milliLiter(s) (225 mL/Hr) IV Continuous <Continuous>  fentaNYL   Infusion 0.5 MICROgram(s)/kG/Hr (4.58 mL/Hr) IV Continuous <Continuous>  fluconAZOLE IVPB 200 milliGRAM(s) IV Intermittent every 24 hours  meropenem  IVPB 500 milliGRAM(s) IV Intermittent every 12 hours  norepinephrine Infusion 0.05 MICROgram(s)/kG/Min (8.48 mL/Hr) IV Continuous <Continuous>  pantoprazole  Injectable 40 milliGRAM(s) IV Push daily        VITALS:  T(F): 101.6 (11-07-24 @ 08:00), Max: 101.6 (11-07-24 @ 08:00)  HR: 65 (11-07-24 @ 08:55)  BP: 145/65 (11-07-24 @ 08:30)  RR: 20 (11-07-24 @ 08:30)  SpO2: 99% (11-07-24 @ 08:55)      11-05 @ 07:01  -  11-06 @ 07:00  --------------------------------------------------------  IN: 3365.9 mL / OUT: 3125 mL / NET: 240.9 mL    11-06 @ 07:01  -  11-07 @ 07:00  --------------------------------------------------------  IN: 3796.5 mL / OUT: 3650 mL / NET: 146.5 mL    11-07 @ 07:01  -  11-07 @ 09:49  --------------------------------------------------------  IN: 578 mL / OUT: 250 mL / NET: 328 mL          PHYSICAL EXAM:  Constitutional: intubated on MV   Respiratory: CTAB,  Cardiovascular: S1, S2, RRR  Gastrointestinal: BS+, soft, NT/ND  Extremities: No cyanosis or clubbing. No peripheral edema  :  No ya.   Skin: No rashes    LABS:  11-07    156[H]  |  121[H]  |  48[H]  ----------------------------<  106[H]  4.1   |  29  |  1.2    Ca    12.5[H]      07 Nov 2024 06:52  Mg     1.7     11-07    TPro  4.6[L]  /  Alb  2.6[L]  /  TBili  0.6  /  DBili      /  AST  8   /  ALT  16  /  AlkPhos  104  11-07                          8.5    13.43 )-----------( 140      ( 07 Nov 2024 06:52 )             26.5       Urine Studies:  Urinalysis Basic - ( 07 Nov 2024 06:52 )    Color:  / Appearance:  / SG:  / pH:   Gluc: 106 mg/dL / Ketone:   / Bili:  / Urobili:    Blood:  / Protein:  / Nitrite:    Leuk Esterase:  / RBC:  / WBC    Sq Epi:  / Non Sq Epi:  / Bacteria:       Sodium, Random Urine: 42.0 mmoL/L (11-04 @ 20:45)  Creatinine, Random Urine: 62 mg/dL (11-04 @ 20:45)  Protein/Creatinine Ratio Calculation: 0.4 Ratio (11-04 @ 20:45)  Osmolality, Random Urine: 372 mos/kg (11-04 @ 20:45)  Potassium, Random Urine: 21 mmol/L (11-04 @ 20:45)      HbA1c 5.6      [12-20-19 @ 07:09]          RADIOLOGY & ADDITIONAL STUDIES:

## 2024-11-07 NOTE — DIETITIAN INITIAL EVALUATION ADULT - PERTINENT LABORATORY DATA
11-07    156[H]  |  121[H]  |  48[H]  ----------------------------<  106[H]  4.1   |  29  |  1.2    Ca    12.5[H]      07 Nov 2024 06:52  Mg     1.7     11-07    TPro  4.6[L]  /  Alb  2.6[L]  /  TBili  0.6  /  DBili  x   /  AST  8   /  ALT  16  /  AlkPhos  104  11-07

## 2024-11-07 NOTE — PROGRESS NOTE ADULT - ASSESSMENT
ASSESSMENT  73-year-old female PMH A-fib on xarelto,, HTN, s/p cholecystectomy, congenital solitary kidney presents from NH to the ED for evaluation of weakness, decreased PO intake and abdominal distension. Pt's son Everton says pt has not been feeling well the past few days, and has had poor appetite which is unlike her. She's been more tired and weak, unable to get out of bed. Has not had a bowel movement in 3 days, pt unsure if she's been passing gas otherwise. She was noted to be hypotensive and have an elevated wbc count at the NH as well. No dysuria, chest pain, SOB, cough or fever per pt otherwise.  No other complaints currently.     In the ED, BP 66/45 S/P LR 2800cc bolus with no improvement in BP, started on peripheral levo. Labs with wbc 19.29, vbg lactate 2.0-->2.3, pH 7.2, pCO2 45, Na 133, AG 21, Cr 4.3 (baseline 1.1), UA contaminated. CTAP with severe gastric distension, multiple foci of intraperitoneal free air and urinary bladder thickening with free air. Surgery consulted, no intervention for now, NGT placed for gastric decompression with 3.1L feculent output,     IMPRESSIONS  # intubated on mechanical ventilation   #Septic shock requiring pressors   Rule out acute cystitis/pyelo as UA pyuria > 900; UCX   >100,000 CFU/ml Streptococcus gallolyticus "Susceptibilities not performed"  Rule out mesenteric ischemia- Severe gastric distension w/ pneumatosis along posterior gastric wall. Free air     Admission WBC 19--> 21    11/3 BCX NGTD x2  < from: CT Abdomen and Pelvis No Cont (11.03.24 @ 11:37) >  1. Severe gastric distention extending to the level of the first/second   portion of duodenum, with new pneumatosis along the posterior gastric   wall, suspicious for ischemia; multiple adjacent foci of free   intraperitoneal air are noted.  2. New small linear foci of air within the peripheral liver, suspicious for portal venous gas.  3. Thick-walled urinary bladder containing multiple foci of air; findings may be related to recent intervention and/or underlying cystitis, however   however please note colovesicular fistula can have this appearance. Clinical correlation is suggested.  4. Small bilateral pleural effusions with small bibasilar opacities/atelectasis.    Stool cx NG    1/2023 UCX ESBL proteus     Cdiff (-)   #Lactic acidosis  #NOLA   Creatinine: 1.2 mg/dL (11.07.24 @ 06:52)      RECOMMENDATIONS  - Febrile + HTN and no tachycardia, consider CTH   - Only if hemodynamic compromise, dose Vanc 1.25g x1   - meropenem  IVPB 1000 milliGRAM(s) IV Intermittent every 12 hours, continue given critical illness  - fluconazole 200mg daily IV, monitor LFTs, QTC  - Strep gallolyticus can be associated with underlying GI malignancy , workup per primary team   - GI / Surgery   - Poor prognosis, GOC    If any questions, please text or call on [employer] Teams  Please continue to update ID with any pertinent new clinical, laboratory or radiographic findings

## 2024-11-07 NOTE — PROGRESS NOTE ADULT - ATTENDING COMMENTS
ACS Attending Note Attestation    Patient is examined and evaluated at the bedside with the residents/PAs. Treatment plan discussed with the team, nurses, and consulting physicians and consulting teams. Medications, radiological studies and all other relevant studies reviewed. I reviewed the resident/PA note and agreed with above assessment and plan with following additions and corrections. Time devoted to teaching and to any procedures I billed separately is not included.     has been intubated since yesterday, sedated when I examined her    Physical Exam:   I independently performed a medically appropriate exam. The exam was notable for    Abd: soft, non tender, not distended, reducible right upper quadrant hernia  NG has bilious output       Labs: I have reviewed the labs on system  WBC: 13, stable Hb, Cr:1.2  normal INR    Radiology: I have reviewed and interpreted the imaging  CXR: left sided effusion  NG tube below the diaphragm    Assessment:   73-year-old female PMH A-fib on xarelto,  s/p open cholecystectomy, congenital solitary kidney presented with lethargy and anorexia, on imaging she was found to have emphysematous changes of a small area of gastric mucosa with gastric distension.   There is no history of gastroparesis  Initial NG tube placement had 3.1 L output.   I think that the emphysematous changes could be due to gastric distension and shw doesn't need any acute surgical intervention, however, she will benefit from an EGD     Continue with NG tube decompression  PPIs  EGD with GI when possible  Remaining as per ICU team     Madai Peters MD  Trauma/ACS/Surgical Critical care Attending

## 2024-11-07 NOTE — PROCEDURE NOTE - NSPROCDETAILS_GEN_ALL_CORE
guidewire recovered/lumen(s) aspirated and flushed/sterile dressing applied/sterile technique, catheter placed/ultrasound guidance with use of sterile gel and probe cove
sterile dressing applied/sterile technique, catheter placed/supine position/ultrasound guidance

## 2024-11-07 NOTE — PROGRESS NOTE ADULT - SUBJECTIVE AND OBJECTIVE BOX
DARCIEPATRICIA  73y, Female  Allergy: No Known Allergies      LOS  4d    CHIEF COMPLAINT: Sepsis     (07 Nov 2024 13:54)      INTERVAL EVENTS/HPI  - T(F): , Max: 101.6 (11-07-24 @ 08:00) febrile, hypertensive off pressors  - WBC Count: 13.27 (11-07-24 @ 11:35)  WBC Count: 13.43 (11-07-24 @ 06:52)     - Creatinine: 1.2 (11-07-24 @ 06:52)  Creatinine: 1.2 (11-06-24 @ 22:45)     -   - D-Dimer Assay, Quantitative: 2405 ng/mL DDU (11-07-24 @ 11:35)    -     ROS  cannot obtain secondary to patient's sedation and/or mental status    VITALS:  T(F): 100, Max: 101.6 (11-07-24 @ 08:00)  HR: 50  BP: 156/65  RR: 15Vital Signs Last 24 Hrs  T(C): 37.8 (07 Nov 2024 12:00), Max: 38.7 (07 Nov 2024 08:00)  T(F): 100 (07 Nov 2024 12:00), Max: 101.6 (07 Nov 2024 08:00)  HR: 50 (07 Nov 2024 15:00) (48 - 79)  BP: 156/65 (07 Nov 2024 15:00) (101/49 - 160/67)  BP(mean): 94 (07 Nov 2024 15:00) (70 - 99)  RR: 15 (07 Nov 2024 15:00) (14 - 29)  SpO2: 99% (07 Nov 2024 15:00) (99% - 100%)    Parameters below as of 07 Nov 2024 15:00  Patient On (Oxygen Delivery Method): ventilator    O2 Concentration (%): 40    PHYSICAL EXAM:  General: intubated  HEENT: NCAT NGT dark output  Neck: supple  CV: RRR  Lungs: symmetric chest expansion, decreased BS at bases  Abd: Soft  Extr: wwp  Skin: no rash  Neuro: sedated  Lines: no phlebitis     FH: Non-contributory  Social Hx: Non-contributory    TESTS & MEASUREMENTS:                        8.0    13.27 )-----------( 143      ( 07 Nov 2024 11:35 )             25.5     11-07    156[H]  |  121[H]  |  48[H]  ----------------------------<  106[H]  4.1   |  29  |  1.2    Ca    12.5[H]      07 Nov 2024 06:52  Mg     1.7     11-07    TPro  4.6[L]  /  Alb  2.6[L]  /  TBili  0.6  /  DBili  x   /  AST  8   /  ALT  16  /  AlkPhos  104  11-07      LIVER FUNCTIONS - ( 07 Nov 2024 06:52 )  Alb: 2.6 g/dL / Pro: 4.6 g/dL / ALK PHOS: 104 U/L / ALT: 16 U/L / AST: 8 U/L / GGT: x           Urinalysis Basic - ( 07 Nov 2024 06:52 )    Color: x / Appearance: x / SG: x / pH: x  Gluc: 106 mg/dL / Ketone: x  / Bili: x / Urobili: x   Blood: x / Protein: x / Nitrite: x   Leuk Esterase: x / RBC: x / WBC x   Sq Epi: x / Non Sq Epi: x / Bacteria: x        Urinalysis with Rflx Culture (collected 11-03-24 @ 13:51)    Culture - Stool (collected 11-03-24 @ 13:51)  Source: .Stool  Final Report (11-05-24 @ 19:02):    No enteric pathogens isolated.    (Stool culture examined for Salmonella,    Shigella, Campylobacter, Aeromonas, Plesiomonas,    Vibrio, E.coli O157 and Yersinia)    Culture - Urine (collected 11-03-24 @ 13:51)  Source: Clean Catch None  Final Report (11-05-24 @ 12:11):    >100,000 CFU/ml Streptococcus gallolyticus "Susceptibilities not    performed"    <10,000 CFU/ml Normal Urogenital rosalva present    Culture - Blood (collected 11-03-24 @ 11:10)  Source: .Blood BLOOD  Preliminary Report (11-06-24 @ 18:00):    No growth at 72 Hours    Culture - Blood (collected 11-03-24 @ 11:10)  Source: .Blood BLOOD  Preliminary Report (11-06-24 @ 18:00):    No growth at 72 Hours        Lactate, Blood: 1.5 mmol/L (11-04-24 @ 16:58)  Lactate, Blood: 3.2 mmol/L (11-04-24 @ 07:19)  Lactate, Blood: 2.9 mmol/L (11-03-24 @ 22:37)  Blood Gas Venous - Lactate: 2.3 mmol/L (11-03-24 @ 19:09)  Lactate, Blood: 2.5 mmol/L (11-03-24 @ 15:33)  Blood Gas Venous - Lactate: 2.0 mmol/L (11-03-24 @ 11:54)  Lactate, Blood: 2.2 mmol/L (11-03-24 @ 11:10)      INFECTIOUS DISEASES TESTING  Procalcitonin: 5.22 (11-05-24 @ 19:32)  MRSA PCR Result.: Negative (11-04-24 @ 13:15)      INFLAMMATORY MARKERS      RADIOLOGY & ADDITIONAL TESTS:  I have personally reviewed the last available Chest xray  CXR  Xray Chest 1 View- PORTABLE-Urgent:   ACC: [id number] EXAM:  XR CHEST PORTABLE URGENT 1V   ORDERED BY: ROBERT SHANE     PROCEDURE DATE:  11/07/2024          INTERPRETATION:  CLINICAL HISTORY: Assess ET tube.    COMPARISON: Chest radiograph 11/7/2024 at 5:53 AM.    TECHNIQUE: Frontal chest radiograph.    FINDINGS:    Support devices: ET tube terminates 4.2 cm above the tor. Enteric tube   courses below the left hemidiaphragm.    Cardiac/mediastinum/hilum: Unchanged.    Lung parenchyma/Pleura: Essentially stable bibasilar opacities/effusion.   No pneumothorax.    Skeleton/soft tissues: Unchanged.      IMPRESSION:    Support devices as described.    Essentially stable bibasilar opacities/effusion.    --- End of Report ---          FLOWER STEVENS MD; Resident Radiologist  This document has been electronically signed.  PARK TA MD; Attending Radiologist  This document has been electronically signed. Nov 7 2024 10:57AM (11-07-24 @ 10:03)      CT      CARDIOLOGY TESTING  12 Lead ECG:   Ventricular Rate 87 BPM    Atrial Rate 85 BPM    QRS Duration 110 ms    Q-T Interval 356 ms    QTC Calculation(Bazett) 428 ms    R Axis 67 degrees    T Axis -10 degrees    Diagnosis Line Atrial fibrillation  Low voltage QRS  Abnormal QRS-T angle, consider primary T wave abnormality  Abnormal ECG    Confirmed by Sanjeev Pierce ([id number]) on 11/7/2024 9:09:37 AM (11-06-24 @ 12:32)      MEDICATIONS  chlorhexidine 0.12% Liquid 15  chlorhexidine 2% Cloths 1  dexMEDEtomidine Infusion 0.05  dextrose 5%. 1000  fentaNYL   Infusion 0.5  fluconAZOLE IVPB 200  meropenem  IVPB 1000  pantoprazole  Injectable 40      WEIGHT  Weight (kg): 91.6 (11-05-24 @ 01:00)  Creatinine: 1.2 mg/dL (11-07-24 @ 06:52)  Creatinine: 1.2 mg/dL (11-06-24 @ 22:45)      ANTIBIOTICS:  fluconAZOLE IVPB 200 milliGRAM(s) IV Intermittent every 24 hours  meropenem  IVPB 1000 milliGRAM(s) IV Intermittent every 12 hours      All available historical records have been reviewed

## 2024-11-07 NOTE — PROGRESS NOTE ADULT - SUBJECTIVE AND OBJECTIVE BOX
VASCULAR SURGERY PROGRESS NOTE    CC:   Hospital Day # 4    Disease: R/O Mesenteric Ischemia    Events of past 24 hours: No acute events overnight. Remains sedated, off pressors and on mechanical ventilation 50/12.          ROS otherwise negative except per subjective and HPI      PAST MEDICAL & SURGICAL HISTORY:  HTN (hypertension)  Diabetes mellitus  Obesity  Gastroesophageal reflux disease  Active asthma  Generalized OA  Afib  H/O hernia repair  2011 and revised in 2013  History of cholecystectomy          Vital Signs Last 24 Hrs  T(C): 38.7 (07 Nov 2024 08:00), Max: 38.7 (07 Nov 2024 08:00)  T(F): 101.6 (07 Nov 2024 08:00), Max: 101.6 (07 Nov 2024 08:00)  HR: 65 (07 Nov 2024 08:55) (64 - 98)  BP: 145/65 (07 Nov 2024 08:30) (90/42 - 151/64)  BP(mean): 93 (07 Nov 2024 08:30) (61 - 99)  RR: 20 (07 Nov 2024 08:30) (13 - 39)  SpO2: 99% (07 Nov 2024 08:55) (98% - 100%)    Parameters below as of 07 Nov 2024 08:00  Patient On (Oxygen Delivery Method): ventilator        Pain (0-10):            Pain Control Adequate: [] YES [] N    Diet:    I&O's Detail    06 Nov 2024 07:01  -  07 Nov 2024 07:00  --------------------------------------------------------  IN:    Dexmedetomidine: 464.7 mL    dextrose 5% + sodium chloride 0.9%: 640 mL    FentaNYL: 270.7 mL    IV PiggyBack: 700 mL    Lactated Ringers: 1700 mL    Norepinephrine: 21.1 mL  Total IN: 3796.5 mL    OUT:    Indwelling Catheter - Urethral (mL): 2700 mL    Nasogastric/Oral tube (mL): 950 mL  Total OUT: 3650 mL    Total NET: 146.5 mL      07 Nov 2024 07:01  -  07 Nov 2024 09:19  --------------------------------------------------------  IN:    dextrose 5% + sodium chloride 0.9%: 160 mL    FentaNYL: 18 mL    Free Water: 400 mL  Total IN: 578 mL    OUT:    Indwelling Catheter - Urethral (mL): 250 mL  Total OUT: 250 mL    Total NET: 328 mL          PHYSICAL EXAM  Appearance: Normal	  HEENT:   Normal oral mucosa, PERRL, EOMI	  Neck: Supple, - JVD;   Cardiovascular: Normal S1 S2, No JVD, No murmurs,   Respiratory: Lungs clear to auscultation, No Rales, Rhonchi, Wheezing	  Gastrointestinal:  Soft, Non-tender.	  Skin: No rashes, No ecchymoses, No cyanosis  Extremities: Normal range of motion, No clubbing, cyanosis or edema  Neurologic: Non-focal  Psychiatry: intubated, unresponsive        MEDICATIONS:   MEDICATIONS  (STANDING):  chlorhexidine 0.12% Liquid 15 milliLiter(s) Oral Mucosa every 12 hours  chlorhexidine 2% Cloths 1 Application(s) Topical <User Schedule>  dexMEDEtomidine Infusion 0.05 MICROgram(s)/kG/Hr (1.15 mL/Hr) IV Continuous <Continuous>  dextrose 5%. 1000 milliLiter(s) (225 mL/Hr) IV Continuous <Continuous>  fentaNYL   Infusion 0.5 MICROgram(s)/kG/Hr (4.58 mL/Hr) IV Continuous <Continuous>  fluconAZOLE IVPB 200 milliGRAM(s) IV Intermittent every 24 hours  lactated ringers Bolus 500 milliLiter(s) IV Bolus once  meropenem  IVPB 500 milliGRAM(s) IV Intermittent every 12 hours  meropenem  IVPB      norepinephrine Infusion 0.05 MICROgram(s)/kG/Min (8.48 mL/Hr) IV Continuous <Continuous>  pantoprazole  Injectable 40 milliGRAM(s) IV Push daily    MEDICATIONS  (PRN):      DVT PROPHYLAXIS: [] YES [] NO  GI PROPHYLAXIS: [] YES [] NO  ANTIPLATELETS: [] YES [] NO  ANTICOAGULATION: [] YES [] NO  ANTIBIOTICS: [] YES [] NO    LAB/STUDIES:                        8.5    13.43 )-----------( 140      ( 07 Nov 2024 06:52 )             26.5     11-07    156[H]  |  121[H]  |  48[H]  ----------------------------<  106[H]  4.1   |  29  |  1.2    Ca    12.5[H]      07 Nov 2024 06:52  Mg     1.7     11-07    TPro  4.6[L]  /  Alb  2.6[L]  /  TBili  0.6  /  DBili  x   /  AST  8   /  ALT  16  /  AlkPhos  104  11-07    PT/INR - ( 06 Nov 2024 22:45 )   PT: 12.30 sec;   INR: 1.04 ratio         PTT - ( 06 Nov 2024 22:45 )  PTT:29.0 sec  LIVER FUNCTIONS - ( 07 Nov 2024 06:52 )  Alb: 2.6 g/dL / Pro: 4.6 g/dL / ALK PHOS: 104 U/L / ALT: 16 U/L / AST: 8 U/L / GGT: x             Urinalysis Basic - ( 07 Nov 2024 06:52 )    Color: x / Appearance: x / SG: x / pH: x  Gluc: 106 mg/dL / Ketone: x  / Bili: x / Urobili: x   Blood: x / Protein: x / Nitrite: x   Leuk Esterase: x / RBC: x / WBC x   Sq Epi: x / Non Sq Epi: x / Bacteria: x      ABG - ( 07 Nov 2024 03:05 )  pH, Arterial: 7.44  pH, Blood: x     /  pCO2: 40    /  pO2: 146   / HCO3: 27    / Base Excess: 2.8   /  SaO2: 96.2        Plan:  - I reviewed labs  - I reviewed radiology imagings  - I personally visualized the imagings  - I discussed the findings and imagings with Dr. Wheeler.   - Please obtain CTA for abdomen and pelvis.   - F/U GI today.   - Keep trending lactate.     VASCULAR SURGERY SPECTRA: [id number]

## 2024-11-07 NOTE — PROGRESS NOTE ADULT - ASSESSMENT
72 yo F NHR with h/o HTN, congenital solitary kidney, PAF on AC, admitted for weakness and poor oral intake a/w abdominal pain and distension.      # NOLA mostly prerenal   # leukocytosis sepsis / SBO  # Hypotension / shock     # solitary kidney   # hypernatremia  # hypercalcemia     - creat better non oliguric   - cont IVF switch to d5w 1/2 NS at 150 cc per hour in the context of hyponatremia   - check PTH vitamin D SPEP UPEP SFLC IF  - replete K > 3.5   - follow BMP  tonight   - on merren and diflucan dose to GFR of 30 ml/min   - no need for RRT     will follow

## 2024-11-07 NOTE — DIETITIAN INITIAL EVALUATION ADULT - PERTINENT MEDS FT
MEDICATIONS  (STANDING):  chlorhexidine 0.12% Liquid 15 milliLiter(s) Oral Mucosa every 12 hours  chlorhexidine 2% Cloths 1 Application(s) Topical <User Schedule>  dexMEDEtomidine Infusion 0.05 MICROgram(s)/kG/Hr (1.15 mL/Hr) IV Continuous <Continuous>  dextrose 5%. 1000 milliLiter(s) (225 mL/Hr) IV Continuous <Continuous>  fentaNYL   Infusion 0.5 MICROgram(s)/kG/Hr (4.58 mL/Hr) IV Continuous <Continuous>  fluconAZOLE IVPB 200 milliGRAM(s) IV Intermittent every 24 hours  meropenem  IVPB 1000 milliGRAM(s) IV Intermittent every 12 hours  norepinephrine Infusion 0.05 MICROgram(s)/kG/Min (8.48 mL/Hr) IV Continuous <Continuous>  pantoprazole  Injectable 40 milliGRAM(s) IV Push two times a day    MEDICATIONS  (PRN):

## 2024-11-08 LAB
ALBUMIN SERPL ELPH-MCNC: 2.4 G/DL — LOW (ref 3.5–5.2)
ALP SERPL-CCNC: 169 U/L — HIGH (ref 30–115)
ALT FLD-CCNC: 23 U/L — SIGNIFICANT CHANGE UP (ref 0–41)
ANION GAP SERPL CALC-SCNC: 7 MMOL/L — SIGNIFICANT CHANGE UP (ref 7–14)
ANION GAP SERPL CALC-SCNC: 7 MMOL/L — SIGNIFICANT CHANGE UP (ref 7–14)
AST SERPL-CCNC: 48 U/L — HIGH (ref 0–41)
BASE EXCESS BLDA CALC-SCNC: 0.8 MMOL/L — SIGNIFICANT CHANGE UP (ref -2–3)
BASOPHILS # BLD AUTO: 0.03 K/UL — SIGNIFICANT CHANGE UP (ref 0–0.2)
BASOPHILS NFR BLD AUTO: 0.2 % — SIGNIFICANT CHANGE UP (ref 0–1)
BILIRUB SERPL-MCNC: 1 MG/DL — SIGNIFICANT CHANGE UP (ref 0.2–1.2)
BLD GP AB SCN SERPL QL: SIGNIFICANT CHANGE UP
BUN SERPL-MCNC: 30 MG/DL — HIGH (ref 10–20)
BUN SERPL-MCNC: 33 MG/DL — HIGH (ref 10–20)
CALCIUM SERPL-MCNC: 10.5 MG/DL — SIGNIFICANT CHANGE UP (ref 8.4–10.5)
CALCIUM SERPL-MCNC: 10.5 MG/DL — SIGNIFICANT CHANGE UP (ref 8.4–10.5)
CHLORIDE SERPL-SCNC: 105 MMOL/L — SIGNIFICANT CHANGE UP (ref 98–110)
CHLORIDE SERPL-SCNC: 107 MMOL/L — SIGNIFICANT CHANGE UP (ref 98–110)
CO2 SERPL-SCNC: 25 MMOL/L — SIGNIFICANT CHANGE UP (ref 17–32)
CO2 SERPL-SCNC: 25 MMOL/L — SIGNIFICANT CHANGE UP (ref 17–32)
CREAT SERPL-MCNC: 0.9 MG/DL — SIGNIFICANT CHANGE UP (ref 0.7–1.5)
CREAT SERPL-MCNC: 0.9 MG/DL — SIGNIFICANT CHANGE UP (ref 0.7–1.5)
CULTURE RESULTS: SIGNIFICANT CHANGE UP
CULTURE RESULTS: SIGNIFICANT CHANGE UP
EGFR: 68 ML/MIN/1.73M2 — SIGNIFICANT CHANGE UP
EGFR: 68 ML/MIN/1.73M2 — SIGNIFICANT CHANGE UP
EOSINOPHIL # BLD AUTO: 0.5 K/UL — SIGNIFICANT CHANGE UP (ref 0–0.7)
EOSINOPHIL NFR BLD AUTO: 3.4 % — SIGNIFICANT CHANGE UP (ref 0–8)
GAS PNL BLDA: SIGNIFICANT CHANGE UP
GLUCOSE BLDC GLUCOMTR-MCNC: 89 MG/DL — SIGNIFICANT CHANGE UP (ref 70–99)
GLUCOSE BLDC GLUCOMTR-MCNC: 90 MG/DL — SIGNIFICANT CHANGE UP (ref 70–99)
GLUCOSE SERPL-MCNC: 109 MG/DL — HIGH (ref 70–99)
GLUCOSE SERPL-MCNC: 81 MG/DL — SIGNIFICANT CHANGE UP (ref 70–99)
HCO3 BLDA-SCNC: 27 MMOL/L — SIGNIFICANT CHANGE UP (ref 21–28)
HCT VFR BLD CALC: 27.5 % — LOW (ref 37–47)
HEPARIN-PF4 AB RESULT: <0.6 U/ML — SIGNIFICANT CHANGE UP (ref 0–0.9)
HGB BLD-MCNC: 8.4 G/DL — LOW (ref 12–16)
HOROWITZ INDEX BLDA+IHG-RTO: 40 — SIGNIFICANT CHANGE UP
IMM GRANULOCYTES NFR BLD AUTO: 1.4 % — HIGH (ref 0.1–0.3)
LYMPHOCYTES # BLD AUTO: 1.6 K/UL — SIGNIFICANT CHANGE UP (ref 1.2–3.4)
LYMPHOCYTES # BLD AUTO: 11 % — LOW (ref 20.5–51.1)
MAGNESIUM SERPL-MCNC: 1.8 MG/DL — SIGNIFICANT CHANGE UP (ref 1.8–2.4)
MCHC RBC-ENTMCNC: 29.5 PG — SIGNIFICANT CHANGE UP (ref 27–31)
MCHC RBC-ENTMCNC: 30.5 G/DL — LOW (ref 32–37)
MCV RBC AUTO: 96.5 FL — SIGNIFICANT CHANGE UP (ref 81–99)
MONOCYTES # BLD AUTO: 1.79 K/UL — HIGH (ref 0.1–0.6)
MONOCYTES NFR BLD AUTO: 12.3 % — HIGH (ref 1.7–9.3)
NEUTROPHILS # BLD AUTO: 10.49 K/UL — HIGH (ref 1.4–6.5)
NEUTROPHILS NFR BLD AUTO: 71.7 % — SIGNIFICANT CHANGE UP (ref 42.2–75.2)
NRBC # BLD: 0 /100 WBCS — SIGNIFICANT CHANGE UP (ref 0–0)
PCO2 BLDA: 46 MMHG — HIGH (ref 32–45)
PF4 HEPARIN CMPLX AB SER-ACNC: NEGATIVE — SIGNIFICANT CHANGE UP
PH BLDA: 7.37 — SIGNIFICANT CHANGE UP (ref 7.35–7.45)
PLATELET # BLD AUTO: 144 K/UL — SIGNIFICANT CHANGE UP (ref 130–400)
PMV BLD: 12.5 FL — HIGH (ref 7.4–10.4)
PO2 BLDA: 77 MMHG — LOW (ref 83–108)
POTASSIUM SERPL-MCNC: 4 MMOL/L — SIGNIFICANT CHANGE UP (ref 3.5–5)
POTASSIUM SERPL-MCNC: 4.3 MMOL/L — SIGNIFICANT CHANGE UP (ref 3.5–5)
POTASSIUM SERPL-SCNC: 4 MMOL/L — SIGNIFICANT CHANGE UP (ref 3.5–5)
POTASSIUM SERPL-SCNC: 4.3 MMOL/L — SIGNIFICANT CHANGE UP (ref 3.5–5)
PROT SERPL-MCNC: 4.2 G/DL — LOW (ref 6–8)
RBC # BLD: 2.85 M/UL — LOW (ref 4.2–5.4)
RBC # FLD: 13.6 % — SIGNIFICANT CHANGE UP (ref 11.5–14.5)
SAO2 % BLDA: 95.1 % — SIGNIFICANT CHANGE UP (ref 94–98)
SODIUM SERPL-SCNC: 137 MMOL/L — SIGNIFICANT CHANGE UP (ref 135–146)
SODIUM SERPL-SCNC: 139 MMOL/L — SIGNIFICANT CHANGE UP (ref 135–146)
SPECIMEN SOURCE: SIGNIFICANT CHANGE UP
SPECIMEN SOURCE: SIGNIFICANT CHANGE UP
WBC # BLD: 14.61 K/UL — HIGH (ref 4.8–10.8)
WBC # FLD AUTO: 14.61 K/UL — HIGH (ref 4.8–10.8)

## 2024-11-08 PROCEDURE — 99232 SBSQ HOSP IP/OBS MODERATE 35: CPT

## 2024-11-08 PROCEDURE — 74174 CTA ABD&PLVS W/CONTRAST: CPT | Mod: 26

## 2024-11-08 PROCEDURE — 70450 CT HEAD/BRAIN W/O DYE: CPT | Mod: 26

## 2024-11-08 PROCEDURE — 71045 X-RAY EXAM CHEST 1 VIEW: CPT | Mod: 26

## 2024-11-08 PROCEDURE — 74018 RADEX ABDOMEN 1 VIEW: CPT | Mod: 26

## 2024-11-08 PROCEDURE — 99291 CRITICAL CARE FIRST HOUR: CPT

## 2024-11-08 RX ORDER — MEROPENEM 500 MG/1
1000 INJECTION, POWDER, FOR SOLUTION INTRAVENOUS EVERY 8 HOURS
Refills: 0 | Status: DISCONTINUED | OUTPATIENT
Start: 2024-11-08 | End: 2024-11-13

## 2024-11-08 RX ORDER — NOREPINEPHRINE BITARTRATE 1 MG/ML
0.05 INJECTION, SOLUTION, CONCENTRATE INTRAVENOUS
Qty: 8 | Refills: 0 | Status: DISCONTINUED | OUTPATIENT
Start: 2024-11-08 | End: 2024-11-11

## 2024-11-08 RX ORDER — ACETAMINOPHEN 500MG 500 MG/1
650 TABLET, COATED ORAL EVERY 6 HOURS
Refills: 0 | Status: DISCONTINUED | OUTPATIENT
Start: 2024-11-08 | End: 2024-12-09

## 2024-11-08 RX ORDER — DEXMEDETOMIDINE HYDROCHLORIDE 4 UG/ML
0.05 INJECTION, SOLUTION INTRAVENOUS
Qty: 400 | Refills: 0 | Status: DISCONTINUED | OUTPATIENT
Start: 2024-11-08 | End: 2024-11-08

## 2024-11-08 RX ORDER — FENTANYL 12 UG/H
50 PATCH, EXTENDED RELEASE TRANSDERMAL DAILY
Refills: 0 | Status: DISCONTINUED | OUTPATIENT
Start: 2024-11-08 | End: 2024-11-12

## 2024-11-08 RX ORDER — PROPOFOL 10 MG/ML
5 INJECTION, EMULSION INTRAVENOUS
Qty: 1000 | Refills: 0 | Status: DISCONTINUED | OUTPATIENT
Start: 2024-11-08 | End: 2024-11-09

## 2024-11-08 RX ORDER — ENOXAPARIN SODIUM 30 MG/.3ML
90 INJECTION SUBCUTANEOUS EVERY 12 HOURS
Refills: 0 | Status: DISCONTINUED | OUTPATIENT
Start: 2024-11-08 | End: 2024-11-18

## 2024-11-08 RX ORDER — PROPOFOL 10 MG/ML
0.5 INJECTION, EMULSION INTRAVENOUS
Qty: 1000 | Refills: 0 | Status: DISCONTINUED | OUTPATIENT
Start: 2024-11-08 | End: 2024-11-08

## 2024-11-08 RX ADMIN — CHLORHEXIDINE GLUCONATE 1 APPLICATION(S): 1.2 RINSE ORAL at 06:27

## 2024-11-08 RX ADMIN — MEROPENEM 100 MILLIGRAM(S): 500 INJECTION, POWDER, FOR SOLUTION INTRAVENOUS at 22:04

## 2024-11-08 RX ADMIN — PROPOFOL 2.75 MICROGRAM(S)/KG/MIN: 10 INJECTION, EMULSION INTRAVENOUS at 22:02

## 2024-11-08 RX ADMIN — ENOXAPARIN SODIUM 90 MILLIGRAM(S): 30 INJECTION SUBCUTANEOUS at 22:03

## 2024-11-08 RX ADMIN — Medication 30 MILLILITER(S): at 14:10

## 2024-11-08 RX ADMIN — MEROPENEM 100 MILLIGRAM(S): 500 INJECTION, POWDER, FOR SOLUTION INTRAVENOUS at 13:08

## 2024-11-08 RX ADMIN — CHLORHEXIDINE GLUCONATE 15 MILLILITER(S): 1.2 RINSE ORAL at 17:31

## 2024-11-08 RX ADMIN — FLUCONAZOLE 100 MILLIGRAM(S): 200 TABLET ORAL at 01:44

## 2024-11-08 RX ADMIN — MEROPENEM 100 MILLIGRAM(S): 500 INJECTION, POWDER, FOR SOLUTION INTRAVENOUS at 06:27

## 2024-11-08 RX ADMIN — PANTOPRAZOLE SODIUM 40 MILLIGRAM(S): 40 TABLET, DELAYED RELEASE ORAL at 17:30

## 2024-11-08 RX ADMIN — PROPOFOL 2.75 MICROGRAM(S)/KG/MIN: 10 INJECTION, EMULSION INTRAVENOUS at 14:34

## 2024-11-08 RX ADMIN — ENOXAPARIN SODIUM 90 MILLIGRAM(S): 30 INJECTION SUBCUTANEOUS at 09:49

## 2024-11-08 RX ADMIN — ACETAMINOPHEN 500MG 650 MILLIGRAM(S): 500 TABLET, COATED ORAL at 13:00

## 2024-11-08 RX ADMIN — FENTANYL 4.58 MICROGRAM(S)/KG/HR: 12 PATCH, EXTENDED RELEASE TRANSDERMAL at 03:04

## 2024-11-08 RX ADMIN — PANTOPRAZOLE SODIUM 40 MILLIGRAM(S): 40 TABLET, DELAYED RELEASE ORAL at 06:27

## 2024-11-08 RX ADMIN — ACETAMINOPHEN 500MG 650 MILLIGRAM(S): 500 TABLET, COATED ORAL at 12:42

## 2024-11-08 RX ADMIN — CHLORHEXIDINE GLUCONATE 15 MILLILITER(S): 1.2 RINSE ORAL at 06:27

## 2024-11-08 RX ADMIN — Medication 225 MILLILITER(S): at 03:46

## 2024-11-08 NOTE — PROGRESS NOTE ADULT - SUBJECTIVE AND OBJECTIVE BOX
Gastroenterology progress note:     Patient is a 73y old  Female who presents with a chief complaint of abd distension (08 Nov 2024 08:48)       Admitted on: 11-03-24    We are following the patient for:   gastric ulcer     PAST MEDICAL & SURGICAL HISTORY:  HTN (hypertension)      Diabetes mellitus      Obesity      Gastroesophageal reflux disease      Active asthma      Generalized OA      Afib      H/O hernia repair  2011 and revised in 2013      History of cholecystectomy          MEDICATIONS  (STANDING):  chlorhexidine 0.12% Liquid 15 milliLiter(s) Oral Mucosa every 12 hours  chlorhexidine 2% Cloths 1 Application(s) Topical <User Schedule>  enoxaparin Injectable 90 milliGRAM(s) SubCutaneous every 12 hours  fluconAZOLE IVPB 200 milliGRAM(s) IV Intermittent every 24 hours  meropenem  IVPB 1000 milliGRAM(s) IV Intermittent every 8 hours  norepinephrine Infusion 0.05 MICROgram(s)/kG/Min (8.59 mL/Hr) IV Continuous <Continuous>  pantoprazole  Injectable 40 milliGRAM(s) IV Push two times a day  propofol Infusion 5 MICROgram(s)/kG/Min (2.75 mL/Hr) IV Continuous <Continuous>    MEDICATIONS  (PRN):  acetaminophen     Tablet .. 650 milliGRAM(s) Oral every 6 hours PRN Temp greater or equal to 38C (100.4F)  fentaNYL    Injectable 50 MICROGram(s) IV Push daily PRN sedation      Allergies  No Known Allergies      Review of Systems:   unable to obtain     Physical Examination:  T(C): 37.9 (11-08-24 @ 16:00), Max: 38.2 (11-08-24 @ 12:00)  HR: 90 (11-08-24 @ 16:15) (48 - 125)  BP: 178/69 (11-08-24 @ 16:00) (88/68 - 186/139)  RR: 19 (11-08-24 @ 16:00) (13 - 32)  SpO2: 98% (11-08-24 @ 16:15) (91% - 100%)      11-07-24 @ 07:01  -  11-08-24 @ 07:00  --------------------------------------------------------  IN: 6254 mL / OUT: 2135 mL / NET: 4119 mL    11-08-24 @ 07:01  -  11-08-24 @ 17:35  --------------------------------------------------------  IN: 381.7 mL / OUT: 485 mL / NET: -103.3 mL        GENERAL:sedated   HEAD:  Atraumatic, Normocephalic  EYES: conjunctiva and sclera clear  NECK: Supple, no JVD or thyromegaly  CHEST/LUNG: Clear to auscultation bilaterally;  HEART: Regular rate and rhythm; normal S1, S2,  ABDOMEN: Soft, nontender, nondistended;   NEUROLOGY: sedated   SKIN: Intact, no jaundice     Data:                        8.4    14.61 )-----------( 144      ( 08 Nov 2024 04:07 )             27.5     Hgb trend:  8.4  11-08-24 @ 04:07  8.0  11-07-24 @ 11:35  8.5  11-07-24 @ 06:52  8.3  11-06-24 @ 22:45  9.1  11-06-24 @ 06:10        11-08    137  |  105  |  30[H]  ----------------------------<  81  4.3   |  25  |  0.9    Ca    10.5      08 Nov 2024 11:14  Mg     1.8     11-08    TPro  4.2[L]  /  Alb  2.4[L]  /  TBili  1.0  /  DBili  x   /  AST  48[H]  /  ALT  23  /  AlkPhos  169[H]  11-08    Liver panel trend:  TBili 1.0   /   AST 48   /   ALT 23   /   AlkP 169   /   Tptn 4.2   /   Alb 2.4    /   DBili --      11-08  TBili 0.6   /   AST 8   /   ALT 16   /   AlkP 104   /   Tptn 4.6   /   Alb 2.6    /   DBili --      11-07  TBili 0.8   /   AST 8   /   ALT 18   /   AlkP 102   /   Tptn 4.6   /   Alb 2.6    /   DBili --      11-06  TBili 0.9   /   AST 11   /   ALT 23   /   AlkP 126   /   Tptn 4.8   /   Alb 2.8    /   DBili --      11-06  TBili 1.3   /   AST 14   /   ALT 33   /   AlkP 125   /   Tptn 4.7   /   Alb 2.9    /   DBili --      11-05  TBili 1.6   /   AST 39   /   ALT 55   /   AlkP 149   /   Tptn 5.3   /   Alb 3.2    /   DBili --      11-04  TBili 1.7   /   AST 43   /   ALT 51   /   AlkP 141   /   Tptn 5.5   /   Alb 3.4    /   DBili --      11-03  TBili 1.2   /   AST 18   /   ALT 33   /   AlkP 124   /   Tptn 6.1   /   Alb 3.7    /   DBili --      11-03      PT/INR - ( 07 Nov 2024 11:35 )   PT: 11.80 sec;   INR: 1.00 ratio         PTT - ( 07 Nov 2024 11:35 )  PTT:28.8 sec       Radiology:

## 2024-11-08 NOTE — PROGRESS NOTE ADULT - ASSESSMENT
73-year-old female PMH A-fib on xarelto,, HTN, s/p cholecystectomy, congenital solitary kidney presents from NH to the ED for evaluation of weakness, decreased PO intake and abdominal distension. Admitted for sepstic shock.    #Septic shock 2/2 cystitis vs intraabdominal infection  #HAGMA 2/2 lactic acidosis and uremia  - ED, BP 66/45 S/P LR 2800cc bolus with no improvement in BP  - wbc 19.29, vbg lactate 2.0-->2.3, pH 7.2, pCO2 45, Na 133, AG 21, Cr 4.3 (baseline 1.1),  - started on peripheral levo in the ED.  - Patient was tachypneic with high pco2 hence placed on BiPAP  - Ct abdomen/pelvis noted  -s/p cefepime and Vanc in the ED  - started on meropenem as per ID recs given UCx positive for ESBL in the past >> increased to 1g meropenem with the improvement of kidney function.  - started flucanazole 400mg loading dose and flucanzole 200mg q24 daily  - Trend lactate, ABG, AG  - Fever curve  - Lactate trend 2.3>>3.2>>1.6>>1.1>>0.8- WNL  - AG 21 >>32>>26- closed    11/5-   - weaned down to 0.15 levophed now- MAP stabilised - MAP-81  - BIPAP to HFNC 60/60 today- patient stating well.  - started on LR @100cc/hr     11/6  - On levophed 0.15. MAP stable  - Patient intubated     #Acute hypoxic respiratory failure  - Patient initially on BiPAP with only signs of tachypenia  - Overnight stating in 70s  - Lactate: 1.6> 1.1  11/5   - pH,  7.43 pCO2, 34 pO2, - 232 HCO3-  23FIO2- 100.0  - switched to HFNC.      11/6  - pH,  7.41 pCO2, 34 pO2, - 65 HCO3-  22 FIO2- 100.0  - Patient intubated     11/7- 11/8  - improved ABG  - stating well with intubation  - minimal vent settings - 380/10/14/40    #NOLA likely ATN 2/2 septic shock - resolved  - congenital Soliatry kidney  - Baseline creatinine - 1.1  - CR on adm- 4.4    Nephro consulted  -  Cr 4.3 > 4.2, Cr 1.4 on 11/1 and 1.0 on 7/5/24  - strict Is&Os  - daily BMP trend Cr correct lytes as needed  - f/u Ucx and Bcx   - obtain Urine studies  - c/w levophed, give fluids   - dose meropenem and flucanazole as eGFR of 30    #Hypernatremia  - 156 on 11/7   - started on D5W @225ml/hr  - Drop in sodium and now at 136  - d/c D5W   - Stat BMP  -Monitor for now    #Abdominal distention   #pneumoperitoneum  - CT noted  - NGT placed for gastric decompression with 3.1L feculent output - intermittent suction  - GI consulted to expedite the scope if necessary  - c/w abx   - Keep NPO   - NGT to intermittent suction   - Further stabilisation for EGD  - Vascular consulted    11/5  - NGT tube draining - feculent output    11/7  - EGD done    Multiple clean based small ulcers were seen in the esophagus likely from NGT trauma. .  	10 cm Ulcer in the stomach body (Greater curvature). (Biopsy).  	Ulcers in the antrum and stomach body.  	Erosions in the stomach compatible with erosive gastritis. (Biopsy).  	Normal mucosa in the whole examined duodenum.    PLAN   - Repeat CT with Gastrografin tomorrow.    - PPI IV BID    - Repeat EGD in two months.    - Will follow.    11/8  - Patient going for CT abd and pelvis with oral gastrografin   -  IV contrast for Ct angio to r/o mesentric ischemia    #Afib on xarelto  - Hold xarelto given the NOLA  - switch to heparin gtt given CrCl <30  - Hold AC until GI recs    #HTN  - on home - coozar 100 and nefedepine 60  - hold home BP meds as MAP is stabilized resume as needed    MISC    #DVT prophylaxis: Started on therapuetic lovenox from today  #GI prophylaxis: PPI  #Diet: NPO for now, Okay to use NGT for contrast  #Activity: out of bed to chair  #Code status: Full Code  #Disposition: Acute    #Handoff  - f/u CT abdomen and pelvis with oral gastrogriffin  - f/u vascular recs  - f/u surgery recs  - Pending CT angio abdomen and pelvis scheduled today

## 2024-11-08 NOTE — CHART NOTE - NSCHARTNOTEFT_GEN_A_CORE
Vascular surgery examined the patient and reviewed the chart  Low suspicion for mesenteric ischemia  Proceed with GI plans for recent finding  No need to obtain CTA A/P when there is no clear indication    Re-call as needed    SPECTRA [id number]

## 2024-11-08 NOTE — PROGRESS NOTE ADULT - SUBJECTIVE AND OBJECTIVE BOX
DARCIE PATRICIA  73y, Female  Allergy: No Known Allergies      LOS  5d    CHIEF COMPLAINT: abd distension (08 Nov 2024 08:48)      INTERVAL EVENTS/HPI  - T(F): , Max: 100.3 (11-07-24 @ 18:00)  - WBC Count: 14.61 (11-08-24 @ 04:07)  WBC Count: 13.27 (11-07-24 @ 11:35)     - Creatinine: 0.9 (11-08-24 @ 04:07)  Creatinine: 1.1 (11-07-24 @ 20:33)     -   - D-Dimer Assay, Quantitative: 2405 ng/mL DDU (11-07-24 @ 11:35)    -     ROS  cannot obtain secondary to patient's sedation and/or mental status    VITALS:  T(F): 99.5, Max: 100.3 (11-07-24 @ 18:00)  HR: 96  BP: 105/58  RR: 18Vital Signs Last 24 Hrs  T(C): 37.5 (08 Nov 2024 08:00), Max: 37.9 (07 Nov 2024 18:00)  T(F): 99.5 (08 Nov 2024 08:00), Max: 100.3 (07 Nov 2024 18:00)  HR: 96 (08 Nov 2024 09:30) (48 - 100)  BP: 105/58 (08 Nov 2024 09:30) (88/68 - 186/139)  BP(mean): 77 (08 Nov 2024 09:30) (65 - 156)  RR: 18 (08 Nov 2024 09:30) (13 - 32)  SpO2: 95% (08 Nov 2024 09:30) (91% - 100%)    Parameters below as of 08 Nov 2024 08:00  Patient On (Oxygen Delivery Method): ventilator    O2 Concentration (%): 40    PHYSICAL EXAM:  ***    FH: Non-contributory  Social Hx: Non-contributory    TESTS & MEASUREMENTS:                        8.4    14.61 )-----------( 144      ( 08 Nov 2024 04:07 )             27.5     11-08    139  |  107  |  33[H]  ----------------------------<  109[H]  4.0   |  25  |  0.9    Ca    10.5      08 Nov 2024 04:07  Mg     1.8     11-08    TPro  4.2[L]  /  Alb  2.4[L]  /  TBili  1.0  /  DBili  x   /  AST  48[H]  /  ALT  23  /  AlkPhos  169[H]  11-08      LIVER FUNCTIONS - ( 08 Nov 2024 04:07 )  Alb: 2.4 g/dL / Pro: 4.2 g/dL / ALK PHOS: 169 U/L / ALT: 23 U/L / AST: 48 U/L / GGT: x           Urinalysis Basic - ( 08 Nov 2024 04:07 )    Color: x / Appearance: x / SG: x / pH: x  Gluc: 109 mg/dL / Ketone: x  / Bili: x / Urobili: x   Blood: x / Protein: x / Nitrite: x   Leuk Esterase: x / RBC: x / WBC x   Sq Epi: x / Non Sq Epi: x / Bacteria: x        Urinalysis with Rflx Culture (collected 11-03-24 @ 13:51)    Culture - Stool (collected 11-03-24 @ 13:51)  Source: .Stool  Final Report (11-05-24 @ 19:02):    No enteric pathogens isolated.    (Stool culture examined for Salmonella,    Shigella, Campylobacter, Aeromonas, Plesiomonas,    Vibrio, E.coli O157 and Yersinia)    Culture - Urine (collected 11-03-24 @ 13:51)  Source: Clean Catch None  Final Report (11-05-24 @ 12:11):    >100,000 CFU/ml Streptococcus gallolyticus "Susceptibilities not    performed"    <10,000 CFU/ml Normal Urogenital rosalva present    Culture - Blood (collected 11-03-24 @ 11:10)  Source: .Blood BLOOD  Preliminary Report (11-07-24 @ 18:00):    No growth at 4 days    Culture - Blood (collected 11-03-24 @ 11:10)  Source: .Blood BLOOD  Preliminary Report (11-07-24 @ 18:00):    No growth at 4 days        Lactate, Blood: 1.5 mmol/L (11-04-24 @ 16:58)  Lactate, Blood: 3.2 mmol/L (11-04-24 @ 07:19)  Lactate, Blood: 2.9 mmol/L (11-03-24 @ 22:37)  Blood Gas Venous - Lactate: 2.3 mmol/L (11-03-24 @ 19:09)  Lactate, Blood: 2.5 mmol/L (11-03-24 @ 15:33)  Blood Gas Venous - Lactate: 2.0 mmol/L (11-03-24 @ 11:54)  Lactate, Blood: 2.2 mmol/L (11-03-24 @ 11:10)      INFECTIOUS DISEASES TESTING  Procalcitonin: 5.22 (11-05-24 @ 19:32)  MRSA PCR Result.: Negative (11-04-24 @ 13:15)      INFLAMMATORY MARKERS      RADIOLOGY & ADDITIONAL TESTS:  I have personally reviewed the last available Chest xray  CXR  Xray Chest 1 View- PORTABLE-Urgent:   ACC: [id number] EXAM:  XR CHEST PORTABLE URGENT 1V   ORDERED BY: ROBERT SHANE     PROCEDURE DATE:  11/07/2024          INTERPRETATION:  CLINICAL HISTORY: Assess ET tube.    COMPARISON: Chest radiograph 11/7/2024 at 5:53 AM.    TECHNIQUE: Frontal chest radiograph.    FINDINGS:    Support devices: ET tube terminates 4.2 cm above the tor. Enteric tube   courses below the left hemidiaphragm.    Cardiac/mediastinum/hilum: Unchanged.    Lung parenchyma/Pleura: Essentially stable bibasilar opacities/effusion.   No pneumothorax.    Skeleton/soft tissues: Unchanged.      IMPRESSION:    Support devices as described.    Essentially stable bibasilar opacities/effusion.    --- End of Report ---          FLOWER STEVENS MD; Resident Radiologist  This document has been electronically signed.  PARK TA MD; Attending Radiologist  This document has been electronically signed. Nov 7 2024 10:57AM (11-07-24 @ 10:03)      CT      CARDIOLOGY TESTING  12 Lead ECG:   Ventricular Rate 87 BPM    Atrial Rate 85 BPM    QRS Duration 110 ms    Q-T Interval 356 ms    QTC Calculation(Bazett) 428 ms    R Axis 67 degrees    T Axis -10 degrees    Diagnosis Line Atrial fibrillation  Low voltage QRS  Abnormal QRS-T angle, consider primary T wave abnormality  Abnormal ECG    Confirmed by Sanjeev Pierce ([id number]) on 11/7/2024 9:09:37 AM (11-06-24 @ 12:32)      MEDICATIONS  chlorhexidine 0.12% Liquid 15  chlorhexidine 2% Cloths 1  dexMEDEtomidine Infusion 0.05  diatrizoate meglumine/diatrizoate sodium. 30  enoxaparin Injectable 90  fentaNYL   Infusion 0.5  fluconAZOLE IVPB 200  meropenem  IVPB 1000  pantoprazole  Injectable 40      WEIGHT  Weight (kg): 91.6 (11-05-24 @ 01:00)  Creatinine: 0.9 mg/dL (11-08-24 @ 04:07)  Creatinine: 1.1 mg/dL (11-07-24 @ 20:33)  Creatinine: 1.1 mg/dL (11-07-24 @ 17:30)  Creatinine: 1.1 mg/dL (11-07-24 @ 17:19)      ANTIBIOTICS:  fluconAZOLE IVPB 200 milliGRAM(s) IV Intermittent every 24 hours  meropenem  IVPB 1000 milliGRAM(s) IV Intermittent every 12 hours      All available historical records have been reviewed   DARCIE PATRICIA  73y, Female  Allergy: No Known Allergies      LOS  5d    CHIEF COMPLAINT: abd distension (08 Nov 2024 08:48)      INTERVAL EVENTS/HPI  - T(F): , Max: 100.3 (11-07-24 @ 18:00)  - WBC Count: 14.61 (11-08-24 @ 04:07)  WBC Count: 13.27 (11-07-24 @ 11:35)     - Creatinine: 0.9 (11-08-24 @ 04:07)  Creatinine: 1.1 (11-07-24 @ 20:33)     -   - D-Dimer Assay, Quantitative: 2405 ng/mL DDU (11-07-24 @ 11:35)    -     ROS  cannot obtain secondary to patient's sedation and/or mental status    VITALS:  T(F): 99.5, Max: 100.3 (11-07-24 @ 18:00)  HR: 96  BP: 105/58  RR: 18Vital Signs Last 24 Hrs  T(C): 37.5 (08 Nov 2024 08:00), Max: 37.9 (07 Nov 2024 18:00)  T(F): 99.5 (08 Nov 2024 08:00), Max: 100.3 (07 Nov 2024 18:00)  HR: 96 (08 Nov 2024 09:30) (48 - 100)  BP: 105/58 (08 Nov 2024 09:30) (88/68 - 186/139)  BP(mean): 77 (08 Nov 2024 09:30) (65 - 156)  RR: 18 (08 Nov 2024 09:30) (13 - 32)  SpO2: 95% (08 Nov 2024 09:30) (91% - 100%)    Parameters below as of 08 Nov 2024 08:00  Patient On (Oxygen Delivery Method): ventilator    O2 Concentration (%): 40    PHYSICAL EXAM:  General: intubated, awake  HEENT: NCAT  Neck: supple  CV: RRR  Lungs: symmetric chest expansion, decreased BS at bases  Abd: Soft  Extr: wwp  Skin: no rash  Neuro: follows simple commands  Lines: no phlebitis     FH: Non-contributory  Social Hx: Non-contributory    TESTS & MEASUREMENTS:                        8.4    14.61 )-----------( 144      ( 08 Nov 2024 04:07 )             27.5     11-08    139  |  107  |  33[H]  ----------------------------<  109[H]  4.0   |  25  |  0.9    Ca    10.5      08 Nov 2024 04:07  Mg     1.8     11-08    TPro  4.2[L]  /  Alb  2.4[L]  /  TBili  1.0  /  DBili  x   /  AST  48[H]  /  ALT  23  /  AlkPhos  169[H]  11-08      LIVER FUNCTIONS - ( 08 Nov 2024 04:07 )  Alb: 2.4 g/dL / Pro: 4.2 g/dL / ALK PHOS: 169 U/L / ALT: 23 U/L / AST: 48 U/L / GGT: x           Urinalysis Basic - ( 08 Nov 2024 04:07 )    Color: x / Appearance: x / SG: x / pH: x  Gluc: 109 mg/dL / Ketone: x  / Bili: x / Urobili: x   Blood: x / Protein: x / Nitrite: x   Leuk Esterase: x / RBC: x / WBC x   Sq Epi: x / Non Sq Epi: x / Bacteria: x        Urinalysis with Rflx Culture (collected 11-03-24 @ 13:51)    Culture - Stool (collected 11-03-24 @ 13:51)  Source: .Stool  Final Report (11-05-24 @ 19:02):    No enteric pathogens isolated.    (Stool culture examined for Salmonella,    Shigella, Campylobacter, Aeromonas, Plesiomonas,    Vibrio, E.coli O157 and Yersinia)    Culture - Urine (collected 11-03-24 @ 13:51)  Source: Clean Catch None  Final Report (11-05-24 @ 12:11):    >100,000 CFU/ml Streptococcus gallolyticus "Susceptibilities not    performed"    <10,000 CFU/ml Normal Urogenital rosalva present    Culture - Blood (collected 11-03-24 @ 11:10)  Source: .Blood BLOOD  Preliminary Report (11-07-24 @ 18:00):    No growth at 4 days    Culture - Blood (collected 11-03-24 @ 11:10)  Source: .Blood BLOOD  Preliminary Report (11-07-24 @ 18:00):    No growth at 4 days        Lactate, Blood: 1.5 mmol/L (11-04-24 @ 16:58)  Lactate, Blood: 3.2 mmol/L (11-04-24 @ 07:19)  Lactate, Blood: 2.9 mmol/L (11-03-24 @ 22:37)  Blood Gas Venous - Lactate: 2.3 mmol/L (11-03-24 @ 19:09)  Lactate, Blood: 2.5 mmol/L (11-03-24 @ 15:33)  Blood Gas Venous - Lactate: 2.0 mmol/L (11-03-24 @ 11:54)  Lactate, Blood: 2.2 mmol/L (11-03-24 @ 11:10)      INFECTIOUS DISEASES TESTING  Procalcitonin: 5.22 (11-05-24 @ 19:32)  MRSA PCR Result.: Negative (11-04-24 @ 13:15)      INFLAMMATORY MARKERS      RADIOLOGY & ADDITIONAL TESTS:  I have personally reviewed the last available Chest xray  CXR  Xray Chest 1 View- PORTABLE-Urgent:   ACC: [id number] EXAM:  XR CHEST PORTABLE URGENT 1V   ORDERED BY: ROBERT SHANE     PROCEDURE DATE:  11/07/2024          INTERPRETATION:  CLINICAL HISTORY: Assess ET tube.    COMPARISON: Chest radiograph 11/7/2024 at 5:53 AM.    TECHNIQUE: Frontal chest radiograph.    FINDINGS:    Support devices: ET tube terminates 4.2 cm above the tor. Enteric tube   courses below the left hemidiaphragm.    Cardiac/mediastinum/hilum: Unchanged.    Lung parenchyma/Pleura: Essentially stable bibasilar opacities/effusion.   No pneumothorax.    Skeleton/soft tissues: Unchanged.      IMPRESSION:    Support devices as described.    Essentially stable bibasilar opacities/effusion.    --- End of Report ---          FLOWER STEVENS MD; Resident Radiologist  This document has been electronically signed.  PARK TA MD; Attending Radiologist  This document has been electronically signed. Nov 7 2024 10:57AM (11-07-24 @ 10:03)      CT      CARDIOLOGY TESTING  12 Lead ECG:   Ventricular Rate 87 BPM    Atrial Rate 85 BPM    QRS Duration 110 ms    Q-T Interval 356 ms    QTC Calculation(Bazett) 428 ms    R Axis 67 degrees    T Axis -10 degrees    Diagnosis Line Atrial fibrillation  Low voltage QRS  Abnormal QRS-T angle, consider primary T wave abnormality  Abnormal ECG    Confirmed by Sanjeev Pierce ([id number]) on 11/7/2024 9:09:37 AM (11-06-24 @ 12:32)      MEDICATIONS  chlorhexidine 0.12% Liquid 15  chlorhexidine 2% Cloths 1  dexMEDEtomidine Infusion 0.05  diatrizoate meglumine/diatrizoate sodium. 30  enoxaparin Injectable 90  fentaNYL   Infusion 0.5  fluconAZOLE IVPB 200  meropenem  IVPB 1000  pantoprazole  Injectable 40      WEIGHT  Weight (kg): 91.6 (11-05-24 @ 01:00)  Creatinine: 0.9 mg/dL (11-08-24 @ 04:07)  Creatinine: 1.1 mg/dL (11-07-24 @ 20:33)  Creatinine: 1.1 mg/dL (11-07-24 @ 17:30)  Creatinine: 1.1 mg/dL (11-07-24 @ 17:19)      ANTIBIOTICS:  fluconAZOLE IVPB 200 milliGRAM(s) IV Intermittent every 24 hours  meropenem  IVPB 1000 milliGRAM(s) IV Intermittent every 12 hours      All available historical records have been reviewed

## 2024-11-08 NOTE — PROGRESS NOTE ADULT - SUBJECTIVE AND OBJECTIVE BOX
Nephrology progress note    Patient is seen and examined, events over the last 24 h noted .  no major events   on MV    Allergies:  No Known Allergies    Hospital Medications:   MEDICATIONS  (STANDING):  dexMEDEtomidine Infusion 0.05 MICROgram(s)/kG/Hr (1.15 mL/Hr) IV Continuous <Continuous>  diatrizoate meglumine/diatrizoate sodium. 30 milliLiter(s) Oral once  enoxaparin Injectable 90 milliGRAM(s) SubCutaneous every 12 hours  fentaNYL   Infusion 0.5 MICROgram(s)/kG/Hr (4.58 mL/Hr) IV Continuous <Continuous>  fluconAZOLE IVPB 200 milliGRAM(s) IV Intermittent every 24 hours  meropenem  IVPB 1000 milliGRAM(s) IV Intermittent every 8 hours  pantoprazole  Injectable 40 milliGRAM(s) IV Push two times a day        VITALS:  T(F): 99.5 (11-08-24 @ 08:00), Max: 100.3 (11-07-24 @ 18:00)  HR: 95 (11-08-24 @ 10:00)  BP: 117/57 (11-08-24 @ 10:00)  RR: 19 (11-08-24 @ 10:00)  SpO2: 92% (11-08-24 @ 10:00)  Wt(kg): --    11-06 @ 07:01  -  11-07 @ 07:00  --------------------------------------------------------  IN: 3796.5 mL / OUT: 3650 mL / NET: 146.5 mL    11-07 @ 07:01  -  11-08 @ 07:00  --------------------------------------------------------  IN: 6254 mL / OUT: 2135 mL / NET: 4119 mL    11-08 @ 07:01  -  11-08 @ 10:50  --------------------------------------------------------  IN: 252.5 mL / OUT: 150 mL / NET: 102.5 mL          PHYSICAL EXAM:  Constitutional: NAD  HEENT: anicteric sclera, oropharynx clear, MMM  Neck: No JVD  Respiratory: CTAB, no wheezes, rales or rhonchi  Cardiovascular: S1, S2, RRR  Gastrointestinal: BS+, soft, NT/ND  Extremities: No cyanosis or clubbing. No peripheral edema  :  No ya.   Skin: No rashes    LABS:  11-08    139  |  107  |  33[H]  ----------------------------<  109[H]  4.0   |  25  |  0.9    Ca    10.5      08 Nov 2024 04:07  Mg     1.8     11-08    TPro  4.2[L]  /  Alb  2.4[L]  /  TBili  1.0  /  DBili      /  AST  48[H]  /  ALT  23  /  AlkPhos  169[H]  11-08                          8.4    14.61 )-----------( 144      ( 08 Nov 2024 04:07 )             27.5       Urine Studies:  Urinalysis Basic - ( 08 Nov 2024 04:07 )    Color:  / Appearance:  / SG:  / pH:   Gluc: 109 mg/dL / Ketone:   / Bili:  / Urobili:    Blood:  / Protein:  / Nitrite:    Leuk Esterase:  / RBC:  / WBC    Sq Epi:  / Non Sq Epi:  / Bacteria:       Sodium, Random Urine: 42.0 mmoL/L (11-04 @ 20:45)  Creatinine, Random Urine: 62 mg/dL (11-04 @ 20:45)  Protein/Creatinine Ratio Calculation: 0.4 Ratio (11-04 @ 20:45)  Osmolality, Random Urine: 372 mos/kg (11-04 @ 20:45)  Potassium, Random Urine: 21 mmol/L (11-04 @ 20:45)      HbA1c 5.6      [12-20-19 @ 07:09]          RADIOLOGY & ADDITIONAL STUDIES:

## 2024-11-08 NOTE — PROGRESS NOTE ADULT - SUBJECTIVE AND OBJECTIVE BOX
Today is hospital day 4d. EGD done today. Pending GI recs and notes. Patient is stating well and is intubated. She is on fentanyl currently. No acute events overnight . Patient awake and responding to commands while intubated. Vascular surgery following and requests for CT angio of the abdomen and pelvis. GI recommended for CT abd/pelvis with oral gastrografin r/o obstruction or gastroparesis. No active complaints.     11/6- This morning patient was seen and examined at bedside, resting comfortably in bed. Patient was noted to have sat 70s and tachypneic to 40-50 on the HFNC. She was placed back on BIPAP 100%. She was Offered ventilatory support which she emphatically declined. At this time, she was mentating well, but more lethargic than her usual. The risk of failure was explained to her, however she expressed that she wanted a chance to breath before intubating her. Meanwhile family was notified, and the family requested everything be done to keep her alive. Because she was only tachypneic without any accessory muscle use, she was monitored on NIV, on which she began to show improvement.   Patient today morning was stating in 80s on the BiPAP. positional changes were made and she was stating well. But later after a while she was noticed to have high RR with accessory muscles for respiration. Patient was explained the need of intubation. Patient family approached to obtain consent for intubation. Family agrees. Patient intubated. NGT suction draining brown fluid - possible fecal matter. Vascular surgery consulted in suspicion of mesentric ischemia.    HOSPITAL COURSE  73-year-old female PMH A-fib on xarelto,, HTN, s/p cholecystectomy, congenital solitary kidney presents from NH to the ED for evaluation of weakness, decreased PO intake and abdominal distension. Pt's son Everton says pt has not been feeling well the past few days, and has had poor appetite which is unlike her. She's been more tired and weak, unable to get out of bed. Has not had a bowel movement in 3 days, pt unsure if she's been passing gas otherwise. She was noted to be hypotensive and have an elevated wbc count at the NH as well. No dysuria, chest pain, SOB, cough or fever per pt otherwise.  No other complaints currently.     In the ED, BP 66/45 S/P LR 2800cc bolus with no improvement in BP, started on peripheral levo. Labs with wbc 19.29, vbg lactate 2.0-->2.3, pH 7.2, pCO2 45, Na 133, AG 21, Cr 4.3 (baseline 1.1), UA contaminated. CTAP with severe gastric distension, multiple foci of intraperitoneal free air and urinary bladder thickening with free air. Surgery consulted, no intervention for now, NGT placed for gastric decompression with 3.1L feculent output, recommend GI consult.      MEDICATIONS  (STANDING):  chlorhexidine 0.12% Liquid 15 milliLiter(s) Oral Mucosa every 12 hours  chlorhexidine 2% Cloths 1 Application(s) Topical <User Schedule>  dexMEDEtomidine Infusion 0.05 MICROgram(s)/kG/Hr (1.15 mL/Hr) IV Continuous <Continuous>  diatrizoate meglumine/diatrizoate sodium. 30 milliLiter(s) Oral once  enoxaparin Injectable 90 milliGRAM(s) SubCutaneous every 12 hours  fentaNYL   Infusion 0.5 MICROgram(s)/kG/Hr (4.58 mL/Hr) IV Continuous <Continuous>  fluconAZOLE IVPB 200 milliGRAM(s) IV Intermittent every 24 hours  meropenem  IVPB 1000 milliGRAM(s) IV Intermittent every 8 hours  pantoprazole  Injectable 40 milliGRAM(s) IV Push two times a day    MEDICATIONS  (PRN):        Vital Signs Last 24 Hrs  T(C): 37.5 (08 Nov 2024 08:00), Max: 37.9 (07 Nov 2024 18:00)  T(F): 99.5 (08 Nov 2024 08:00), Max: 100.3 (07 Nov 2024 18:00)  HR: 105 (08 Nov 2024 11:30) (48 - 125)  BP: 113/63 (08 Nov 2024 11:30) (88/68 - 186/139)  BP(mean): 81 (08 Nov 2024 11:30) (65 - 156)  RR: 20 (08 Nov 2024 11:30) (13 - 32)  SpO2: 97% (08 Nov 2024 11:30) (91% - 100%)    Parameters below as of 08 Nov 2024 08:00  Patient On (Oxygen Delivery Method): ventilator    O2 Concentration (%): 40      Physical Exam:   GENERAL: elderly appearing, Et tube in place  HEENT: Normocephalic, atraumatic  PULMONARY: Clear to auscultation bilaterally. No rales, ronchi, or wheezing.   CARDIOVASCULAR: Regular rate and rhythm, S1-S2, no murmurs   GASTROINTESTINAL: Soft, non-tender, non-distended, no guarding.   SKIN/EXTREMITIES: No LE edema b/l  NEUROLOGIC: AAOX3        LABS:                          8.4    14.61 )-----------( 144      ( 08 Nov 2024 04:07 )             27.5     11-08    139  |  107  |  33[H]  ----------------------------<  109[H]  4.0   |  25  |  0.9    Ca    10.5      08 Nov 2024 04:07  Mg     1.8     11-08    TPro  4.2[L]  /  Alb  2.4[L]  /  TBili  1.0  /  DBili  x   /  AST  48[H]  /  ALT  23  /  AlkPhos  169[H]  11-08    LIVER FUNCTIONS - ( 08 Nov 2024 04:07 )  Alb: 2.4 g/dL / Pro: 4.2 g/dL / ALK PHOS: 169 U/L / ALT: 23 U/L / AST: 48 U/L / GGT: x           PT/INR - ( 07 Nov 2024 11:35 )   PT: 11.80 sec;   INR: 1.00 ratio         PTT - ( 07 Nov 2024 11:35 )  PTT:28.8 sec  Urinalysis Basic - ( 08 Nov 2024 04:07 )    Color: x / Appearance: x / SG: x / pH: x  Gluc: 109 mg/dL / Ketone: x  / Bili: x / Urobili: x   Blood: x / Protein: x / Nitrite: x   Leuk Esterase: x / RBC: x / WBC x   Sq Epi: x / Non Sq Epi: x / Bacteria: x      Urinalysis with Rflx Culture (collected 03 Nov 2024 13:51)    Culture - Stool (collected 03 Nov 2024 13:51)  Source: .Stool  Preliminary Report (04 Nov 2024 21:38):    No enteric pathogens to date: Final culture pending    Culture - Blood (collected 03 Nov 2024 11:10)  Source: .Blood BLOOD  Preliminary Report (04 Nov 2024 18:01):    No growth at 24 hours    Culture - Blood (collected 03 Nov 2024 11:10)  Source: .Blood BLOOD  Preliminary Report (04 Nov 2024 18:01):    No growth at 24 hours      IMAGING    Prior EGD/ Colonoscopy:  < from: EGD w/ PEG Placement (02.16.23 @ 10:30) >  Impressions:    Grade D esophagitis compatible with erosive esophagitis.    Erythema in the stomach compatible with non-erosive gastritis.    Hiatal Hernia.    Normal mucosa in the whole examined duodenum.    < end of copied text >    US ABDOMEN RT UPR QUADRANT  IMPRESSION:  Status post cholecystectomy. Heterogeneous poorly visualized liver.   Previously noted portal venous air on prior CAT scan not well appreciated   on this limited exam.    XR CHEST PORTABLE ROUTINE 1V  IMPRESSION:    1. Unchanged bilateral opacities/pleural effusions.    ECHO- 02/23  Summary:   1. Left ventricular ejection fraction, by visual estimation, is >70%.   2. Hyperdynamic global left ventricular systolic function.   3. The left ventricular diastolic function could not be assessed in this   study.   4. Left atrial enlargement.   5. Calcified aortic valve with normal opening.   6. Mild tricuspid regurgitation.   7. Estimated pulmonary artery systolic pressure is 39.5 mmHg assuming a   right atrial pressure of 3 mmHg, which is consistent with borderline   pulmonary hypertension.    ECHO  Summary:   1. Technically difficult study.   2. Hyperdynamic global left ventricular systolic function with a biplane   EF of 71%. Indeterminate diastolic function. Suboptimal endocardial   visualization; patient refused echocontrast.   3. Grossly normal right ventricular size and function.   4. Left atrial enlargement by visual assessment.   5. Fibrocalcific aortic valve with mild to moderate aortic valve   stenosis (Vmax 2.99m/s, mean PG 21mmHg, DI 0.59, DONNA 1.76cm2). Note that   gradients may be increased by hyperdynamic left ventricular function.   6. Adequate TR velocity was not obtained to accurately assess RVSP.   7. Trivial pericardial effusion.      CT ABDOMEN AND PELVIS    IMPRESSION:    1. Severe gastric distention extending to the level of the first/second   portion of duodenum, with new pneumatosis along the posterior gastric   wall, suspicious for ischemia; multiple adjacent foci of free   intraperitoneal air are noted.  2. New small linear foci of air within the peripheral liver, suspicious   for portal venous gas.  3. Thick-walled urinary bladder containing multiple foci of air; findings   may be related to recent intervention and/or underlying cystitis, however   however please note colovesicular fistula can have this appearance.   Clinical correlation is suggested.  4. Small bilateral pleural effusions with small bibasilar   opacities/atelectasis.      XR KUB 1 VIEW - 11/08    impression:    Tip of nasogastric tube overlies the right lower quadrant. There is a   pelvic catheter    Nonobstructive bowel gas pattern. No evidence of air-filled stomach on   this examination.    There is a left base opacity.    Surgical clips in the right upper quadrant    Stable osseous structures.       XR CHEST PORTABLE ROUTINE - 11/08  Impression:    Bilateral opacities and effusions similar to prior

## 2024-11-08 NOTE — PROGRESS NOTE ADULT - ASSESSMENT
ASSESSMENT  73-year-old female PMH A-fib on xarelto,, HTN, s/p cholecystectomy, congenital solitary kidney presents from NH to the ED for evaluation of weakness, decreased PO intake and abdominal distension. Pt's son Everton says pt has not been feeling well the past few days, and has had poor appetite which is unlike her. She's been more tired and weak, unable to get out of bed. Has not had a bowel movement in 3 days, pt unsure if she's been passing gas otherwise. She was noted to be hypotensive and have an elevated wbc count at the NH as well. No dysuria, chest pain, SOB, cough or fever per pt otherwise.  No other complaints currently.     In the ED, BP 66/45 S/P LR 2800cc bolus with no improvement in BP, started on peripheral levo. Labs with wbc 19.29, vbg lactate 2.0-->2.3, pH 7.2, pCO2 45, Na 133, AG 21, Cr 4.3 (baseline 1.1), UA contaminated. CTAP with severe gastric distension, multiple foci of intraperitoneal free air and urinary bladder thickening with free air. Surgery consulted, no intervention for now, NGT placed for gastric decompression with 3.1L feculent output,     IMPRESSIONS  # intubated on mechanical ventilation   #Septic shock requiring pressors   Rule out acute cystitis/pyelo as UA pyuria > 900; UCX   >100,000 CFU/ml Streptococcus gallolyticus "Susceptibilities not performed"  Rule out mesenteric ischemia- Severe gastric distension w/ pneumatosis along posterior gastric wall. Free air     Admission WBC 19--> 21    11/3 BCX NGTD x2  < from: CT Abdomen and Pelvis No Cont (11.03.24 @ 11:37) >  1. Severe gastric distention extending to the level of the first/second   portion of duodenum, with new pneumatosis along the posterior gastric   wall, suspicious for ischemia; multiple adjacent foci of free   intraperitoneal air are noted.  2. New small linear foci of air within the peripheral liver, suspicious for portal venous gas.  3. Thick-walled urinary bladder containing multiple foci of air; findings may be related to recent intervention and/or underlying cystitis, however   however please note colovesicular fistula can have this appearance. Clinical correlation is suggested.  4. Small bilateral pleural effusions with small bibasilar opacities/atelectasis.    Stool cx NG    1/2023 UCX ESBL proteus     Cdiff (-)   #Lactic acidosis  #NOLA   Creatinine: 0.9 mg/dL (11.08.24 @ 04:07) crcl 65    RECOMMENDATIONS  - Trend fever curve, WBC  - INCREASE to meropenem  IVPB 1000 milliGRAM(s) IV Intermittent every 8 hours, continue given critical illness 11/3-  - fluconazole 200mg daily IV, monitor LFTs, QTC  - Strep gallolyticus can be associated with underlying GI malignancy , workup per primary team   - GI / Surgery   - Poor prognosis, GOC    If any questions, please text or call on [employer] Teams  Please continue to update ID with any pertinent new clinical, laboratory or radiographic findings

## 2024-11-08 NOTE — PROGRESS NOTE ADULT - ASSESSMENT
IMPRESSION:    Acute hypoxemic respiratory failure  Septic shock  UTI  Severe gastric distension w/ pneumatosis along posterior gastric wall - R/o ischemia  Possible aspiration   Intraperitoneal free air  Thrombocytopenia   HAGMA  NOLA non oliguric  Afib on Xarelto  HO HTN  HO ESBL Proteus    PLAN:    CNS: SAT daily.  Pain control as needed     HEENT: Oral care.  ET care     PULMONARY:  HOB @ 45 degrees.  Aspiration precautions. Wean o2 as tolerated.  Wean FiO2.  PEEP 10.  Monitor airway pressures.      CARDIOVASCULAR: TTE.  Goal directed fluid resuscitation.  Stop D5W.      GI: GI prophylaxis.  NPO w/ NGT to suction.  Surgery following.  GI eval noted: s/p EGD today.  CT Angio abd and pelvis w/ PO contrast today.      RENAL:  Follow up lytes.  Correct as needed.  Strict intake and output.  Burger for is and Os.    INFECTIOUS DISEASE: Follow up cultures.  Meropenem and Fluconazole.  ID eval noted.  MRSA nares neg.      HEMATOLOGICAL:  DVT prophylaxis.  Hold full dose anticoagulation.  Monitor CBC and coags.  HIT and DIC      ENDOCRINE:  Follow up FS.  Insulin protocol if needed.    MUSCULOSKELETAL: Bed chair position.  Off loading    Full code    MICU    DC TLC      IMPRESSION:    Acute hypoxemic respiratory failure  Septic shock - improved  UTI  Severe gastric distension w/ pneumatosis along posterior gastric wall - R/o ischemia  Possible aspiration   Intraperitoneal free air  Thrombocytopenia   HAGMA  NOLA non oliguric  Afib on Xarelto  HO HTN  HO ESBL Proteus    PLAN:    CNS: SAT daily.  Pain control as needed.    HEENT: Oral care.  ET care     PULMONARY:  HOB @ 45 degrees.  Aspiration precautions. No vent changes today.  Monitor airway pressures.      CARDIOVASCULAR: TTE.  Goal directed fluid resuscitation.  Stop D5W.      GI: GI prophylaxis.  NPO w/ NGT to suction.  Surgery following.  GI eval noted: s/p EGD today.  CT Angio abd and pelvis w/ PO contrast today.      RENAL:  Follow up lytes.  Correct as needed.  Strict intake and output.  Burger for is and Os.    INFECTIOUS DISEASE: Follow up cultures.  Meropenem and Fluconazole.  ID eval noted.  MRSA nares neg.      HEMATOLOGICAL:  Resume AC.  Monitor CBC and coags.  HIT and DIC negative    ENDOCRINE:  Follow up FS.  Insulin protocol if needed.    MUSCULOSKELETAL: Bed chair position.  Off loading    Full code    MICU  Burger    IMPRESSION:    Acute hypoxemic respiratory failure  Septic shock - improved  UTI  Severe gastric distension w/ pneumatosis along posterior gastric wall - R/o ischemia  Possible aspiration   Intraperitoneal free air  Thrombocytopenia   HAGMA  NOLA non oliguric  Afib on Xarelto  HO HTN  HO ESBL Proteus    PLAN:    CNS: SAT daily.  Pain control as needed.  Avoid opiates     HEENT: Oral care.  ET care     PULMONARY:  HOB @ 45 degrees.  Aspiration precautions. No vent changes today.  Monitor airway pressures.      CARDIOVASCULAR: TTE.  Goal directed fluid resuscitation.  Stop D5W.      GI: GI prophylaxis.  NPO w/ NGT to suction.  Surgery following.  GI eval noted: s/p EGD.  DW GI.  CT Angio abd and pelvis w/ PO contrast today.      RENAL:  Follow up lytes.  Correct as needed.  Strict intake and output.  Burger for is and Os.    INFECTIOUS DISEASE: Follow up cultures.  Meropenem and Fluconazole.  ID eval noted.  MRSA nares neg.      HEMATOLOGICAL:  Resume AC.  Monitor CBC and coags.  HIT and DIC negative    ENDOCRINE:  Follow up FS.  Insulin protocol if needed.    MUSCULOSKELETAL: Bed chair position.  Off loading    Full code    MICU  Burger

## 2024-11-08 NOTE — PROGRESS NOTE ADULT - ATTENDING COMMENTS
ACS Attending Note Attestation    Patient is examined and evaluated at the bedside with the residents/PAs. Treatment plan discussed with the team, nurses, and consulting physicians and consulting teams. Medications, radiological studies and all other relevant studies reviewed. I reviewed the resident/PA note and agreed with above assessment and plan with following additions and corrections. Time devoted to teaching and to any procedures I billed separately is not included.     tolerated EGD, no issues overnight    Physical Exam:   I independently performed a medically appropriate exam. The exam was notable for  intubated and sedated  Abd: soft, non tender, not distended, reducible right upper quadrant hernia  NG has bilious output       Labs: I have reviewed the labs on system  WBC: 14, stable Hb, Cr:0.9  normal INR    Radiology: I have reviewed and interpreted the imaging  CXR: left sided effusion  NG tube below the diaphragm    Assessment/ Plan:  73-year-old female PMH A-fib on xarelto,  s/p open cholecystectomy, congenital solitary kidney presented with lethargy and anorexia, on imaging she was found to have emphysematous changes of a small area of gastric mucosa with gastric distension.   EGD shows a 10 cm ulcer in the stomach body, multiple ulcers in the antrum and body. These could be reason for the radiographic finding of the emphysematous changes.       NG to LIS   CT with PO contrast to rule put distal obstruction ( the patient had distended stomach on presentation)  PPIs  Remaining as per ICU team     Madai Peters MD  Trauma/ACS/Surgical Critical care Attending .

## 2024-11-08 NOTE — PROGRESS NOTE ADULT - SUBJECTIVE AND OBJECTIVE BOX
GENERAL SURGERY PROGRESS NOTE    Patient: PATRICIA SPRAGUE , 73y ([birth year])Female   MRN: [id number]  Location: [address]  Visit: 11-03-24 Inpatient  Date: 11-08-24 @ 08:49    Hospital Day #: 5    Procedure/Dx/Injuries:  - Urosepsis  - gastric distension ? GOO    Events of past 24 hours:  No acute events. intubated and sedated.  NG tube with 250cc output overnight  Afebrile, HDS, no vasopressors    PAST MEDICAL & SURGICAL HISTORY:  HTN (hypertension)      Diabetes mellitus      Obesity      Gastroesophageal reflux disease      Active asthma      Generalized OA      Afib      H/O hernia repair  2011 and revised in 2013      History of cholecystectomy          Vitals:   T(F): 99.5 (11-08-24 @ 08:00), Max: 100.3 (11-07-24 @ 18:00)  HR: 99 (11-08-24 @ 08:35)  BP: 124/72 (11-08-24 @ 08:00)  RR: 14 (11-08-24 @ 08:00)  SpO2: 95% (11-08-24 @ 08:35)  Mode: AC/ CMV (Assist Control/ Continuous Mandatory Ventilation), RR (machine): 14, TV (machine): 380, FiO2: 40, PEEP: 10, ITime: 1, MAP: 16, PIP: 25    Diet, NPO:   Except Medications      Fluids:     I & O's:    11-07-24 @ 07:01  -  11-08-24 @ 07:00  --------------------------------------------------------  IN:    Dexmedetomidine: 39 mL    dextrose 5%: 4950 mL    dextrose 5% + sodium chloride 0.9%: 160 mL    FentaNYL: 555 mL    Free Water: 400 mL    IV PiggyBack: 150 mL  Total IN: 6254 mL    OUT:    Indwelling Catheter - Urethral (mL): 1885 mL    Nasogastric/Oral tube (mL): 250 mL  Total OUT: 2135 mL    Total NET: 4119 mL        Bowel Movement: : [] YES [] NO  Flatus: : [] YES [] NO    PHYSICAL EXAM:  General: NAD, AAOx3, calm and cooperative  HEENT: NCAT, MAYCO, EOMI, Trachea ML, Neck supple  Cardiac: RRR S1, S2, no Murmurs, rubs or gallops  Respiratory: CTAB, normal respiratory effort, breath sounds equal BL, no wheeze, rhonchi or crackles  Abdomen: Soft, non-distended, non-tender, no rebound, no guarding. +BS.  Rectal: Good tone, +stool, no blood, no leesa-anal masses/lesions, no fistulas, fissures, hemorrhoids  Musculoskeletal: Strength 5/5 BL UE/LE, ROM intact, compartments soft  Neuro: Sensation grossly intact and equal throughout, no focal deficits  Vascular: Pulses 2+ throughout, extremities well perfused  Skin: Warm/dry, normal color, no jaundice  Incision/wound: healing well, dressings in place, clean, dry and intact    MEDICATIONS  (STANDING):  chlorhexidine 0.12% Liquid 15 milliLiter(s) Oral Mucosa every 12 hours  chlorhexidine 2% Cloths 1 Application(s) Topical <User Schedule>  dexMEDEtomidine Infusion 0.05 MICROgram(s)/kG/Hr (1.15 mL/Hr) IV Continuous <Continuous>  fentaNYL   Infusion 0.5 MICROgram(s)/kG/Hr (4.58 mL/Hr) IV Continuous <Continuous>  fluconAZOLE IVPB 200 milliGRAM(s) IV Intermittent every 24 hours  meropenem  IVPB 1000 milliGRAM(s) IV Intermittent every 12 hours  pantoprazole  Injectable 40 milliGRAM(s) IV Push two times a day    MEDICATIONS  (PRN):      DVT PROPHYLAXIS:   GI PROPHYLAXIS: pantoprazole  Injectable 40 milliGRAM(s) IV Push two times a day    ANTICOAGULATION:   ANTIBIOTICS:  fluconAZOLE IVPB 200 milliGRAM(s)  meropenem  IVPB 1000 milliGRAM(s)            LAB/STUDIES:  Labs:  CAPILLARY BLOOD GLUCOSE                              8.4    14.61 )-----------( 144      ( 08 Nov 2024 04:07 )             27.5       Auto Neutrophil %: 71.7 % (11-08-24 @ 04:07)  Auto Immature Granulocyte %: 1.4 % (11-08-24 @ 04:07)  Auto Neutrophil %: 79.0 % (11-07-24 @ 11:35)  Auto Immature Granulocyte %: 1.7 % (11-07-24 @ 11:35)    11-08    139  |  107  |  33[H]  ----------------------------<  109[H]  4.0   |  25  |  0.9      Calcium: 10.5 mg/dL (11-08-24 @ 04:07)      LFTs:             4.2  | 1.0  | 48       ------------------[169     ( 08 Nov 2024 04:07 )  2.4  | x    | 23          Lipase:x      Amylase:x         Blood Gas Arterial, Lactate: 1.6 mmol/L (11-08-24 @ 03:22)  Blood Gas Arterial, Lactate: 0.8 mmol/L (11-07-24 @ 03:05)  Blood Gas Arterial, Lactate: 1.0 mmol/L (11-06-24 @ 12:38)  Blood Gas Arterial, Lactate: 1.1 mmol/L (11-06-24 @ 00:29)  Blood Gas Arterial, Lactate: 1.6 mmol/L (11-05-24 @ 22:50)    ABG - ( 08 Nov 2024 03:22 )  pH: 7.37  /  pCO2: 46    /  pO2: 77    / HCO3: 27    / Base Excess: 0.8   /  SaO2: 95.1            ABG - ( 07 Nov 2024 03:05 )  pH: 7.44  /  pCO2: 40    /  pO2: 146   / HCO3: 27    / Base Excess: 2.8   /  SaO2: 96.2            ABG - ( 06 Nov 2024 12:38 )  pH: 7.44  /  pCO2: 38    /  pO2: 195   / HCO3: 26    / Base Excess: 1.6   /  SaO2: 96.3              Coags:     11.80  ----< 1.00    ( 07 Nov 2024 11:35 )     28.8                Urinalysis Basic - ( 08 Nov 2024 04:07 )    Color: x / Appearance: x / SG: x / pH: x  Gluc: 109 mg/dL / Ketone: x  / Bili: x / Urobili: x   Blood: x / Protein: x / Nitrite: x   Leuk Esterase: x / RBC: x / WBC x   Sq Epi: x / Non Sq Epi: x / Bacteria: x                IMAGING:      ACCESS/ DEVICES:  [ ] Peripheral IV  [ ] Central Venous Line	[ ] R	[ ] L	[ ] IJ	[ ] Fem	[ ] SC	Placed:   [ ] Arterial Line		[ ] R	[ ] L	[ ] Fem	[ ] Rad	[ ] Ax	Placed:   [ ] PICC:					[ ] Mediport  [ ] Urinary Catheter,  Date Placed:   [ ] Chest tube: [ ] Right, [ ] Left  [ ] BULMARO/Gregorio Drains      ASSESSMENT:  73y F w/ PMHx of  ****    PLAN:  -  -  -    Lines/Tubes: PIV, Midline, Central Line, A-Line, Chest tubes, Gregorio/BULMARO drains, Burger Catheter.   GENERAL SURGERY PROGRESS NOTE    Patient: PATRICIA SPRAGUE , 73y ([birth year])Female   MRN: [id number]  Location: [address]  Visit: 11-03-24 Inpatient  Date: 11-08-24 @ 08:49    Hospital Day #: 5    Procedure/Dx/Injuries:  - Urosepsis  - gastric distension ? GOO    Events of past 24 hours:  No acute events. intubated and sedated.  NG tube with 250cc output overnight  Afebrile, HDS, no vasopressors    PAST MEDICAL & SURGICAL HISTORY:  HTN (hypertension)      Diabetes mellitus      Obesity      Gastroesophageal reflux disease      Active asthma      Generalized OA      Afib      H/O hernia repair  2011 and revised in 2013      History of cholecystectomy          Vitals:   T(F): 99.5 (11-08-24 @ 08:00), Max: 100.3 (11-07-24 @ 18:00)  HR: 99 (11-08-24 @ 08:35)  BP: 124/72 (11-08-24 @ 08:00)  RR: 14 (11-08-24 @ 08:00)  SpO2: 95% (11-08-24 @ 08:35)  Mode: AC/ CMV (Assist Control/ Continuous Mandatory Ventilation), RR (machine): 14, TV (machine): 380, FiO2: 40, PEEP: 10, ITime: 1, MAP: 16, PIP: 25    Diet, NPO:   Except Medications      Fluids:     I & O's:    11-07-24 @ 07:01  -  11-08-24 @ 07:00  --------------------------------------------------------  IN:    Dexmedetomidine: 39 mL    dextrose 5%: 4950 mL    dextrose 5% + sodium chloride 0.9%: 160 mL    FentaNYL: 555 mL    Free Water: 400 mL    IV PiggyBack: 150 mL  Total IN: 6254 mL    OUT:    Indwelling Catheter - Urethral (mL): 1885 mL    Nasogastric/Oral tube (mL): 250 mL  Total OUT: 2135 mL    Total NET: 4119 mL    PHSYICAL EXAM:   General Appearance: Intubated  HEENT: Normocephalic, atraumatic  Heart: s1, s2,    Lungs: No increased work of breathing or accessory muscle use. Symmetric chest wall rise and fall. On vent 60/12  Abdomen:  Soft, nontender, nondistended. No rebound or guarding.  MSK/Extremities: Warm & well-perfused.   Skin: Warm, dry. No jaundice.       MEDICATIONS  (STANDING):  chlorhexidine 0.12% Liquid 15 milliLiter(s) Oral Mucosa every 12 hours  chlorhexidine 2% Cloths 1 Application(s) Topical <User Schedule>  dexMEDEtomidine Infusion 0.05 MICROgram(s)/kG/Hr (1.15 mL/Hr) IV Continuous <Continuous>  fentaNYL   Infusion 0.5 MICROgram(s)/kG/Hr (4.58 mL/Hr) IV Continuous <Continuous>  fluconAZOLE IVPB 200 milliGRAM(s) IV Intermittent every 24 hours  meropenem  IVPB 1000 milliGRAM(s) IV Intermittent every 12 hours  pantoprazole  Injectable 40 milliGRAM(s) IV Push two times a day    MEDICATIONS  (PRN):      DVT PROPHYLAXIS:   GI PROPHYLAXIS: pantoprazole  Injectable 40 milliGRAM(s) IV Push two times a day    ANTICOAGULATION:   ANTIBIOTICS:  fluconAZOLE IVPB 200 milliGRAM(s)  meropenem  IVPB 1000 milliGRAM(s)    LAB/STUDIES:  Labs:  CAPILLARY BLOOD GLUCOSE                              8.4    14.61 )-----------( 144      ( 08 Nov 2024 04:07 )             27.5       Auto Neutrophil %: 71.7 % (11-08-24 @ 04:07)  Auto Immature Granulocyte %: 1.4 % (11-08-24 @ 04:07)  Auto Neutrophil %: 79.0 % (11-07-24 @ 11:35)  Auto Immature Granulocyte %: 1.7 % (11-07-24 @ 11:35)    11-08    139  |  107  |  33[H]  ----------------------------<  109[H]  4.0   |  25  |  0.9      Calcium: 10.5 mg/dL (11-08-24 @ 04:07)      LFTs:             4.2  | 1.0  | 48       ------------------[169     ( 08 Nov 2024 04:07 )  2.4  | x    | 23          Lipase:x      Amylase:x         Blood Gas Arterial, Lactate: 1.6 mmol/L (11-08-24 @ 03:22)  Blood Gas Arterial, Lactate: 0.8 mmol/L (11-07-24 @ 03:05)  Blood Gas Arterial, Lactate: 1.0 mmol/L (11-06-24 @ 12:38)  Blood Gas Arterial, Lactate: 1.1 mmol/L (11-06-24 @ 00:29)  Blood Gas Arterial, Lactate: 1.6 mmol/L (11-05-24 @ 22:50)    ABG - ( 08 Nov 2024 03:22 )  pH: 7.37  /  pCO2: 46    /  pO2: 77    / HCO3: 27    / Base Excess: 0.8   /  SaO2: 95.1      ABG - ( 07 Nov 2024 03:05 )  pH: 7.44  /  pCO2: 40    /  pO2: 146   / HCO3: 27    / Base Excess: 2.8   /  SaO2: 96.2        ABG - ( 06 Nov 2024 12:38 )  pH: 7.44  /  pCO2: 38    /  pO2: 195   / HCO3: 26    / Base Excess: 1.6   /  SaO2: 96.3        Coags:     11.80  ----< 1.00    ( 07 Nov 2024 11:35 )     28.8        Urinalysis Basic - ( 08 Nov 2024 04:07 )    Color: x / Appearance: x / SG: x / pH: x  Gluc: 109 mg/dL / Ketone: x  / Bili: x / Urobili: x   Blood: x / Protein: x / Nitrite: x   Leuk Esterase: x / RBC: x / WBC x   Sq Epi: x / Non Sq Epi: x / Bacteria: x

## 2024-11-08 NOTE — PROGRESS NOTE ADULT - ASSESSMENT
73-year-old female PMH A-fib on xarelto,, HTN, s/p cholecystectomy, congenital solitary kidney presents from NH to the ED for evaluation of weakness, decreased PO intake and abdominal distension. Has not had a bowel movement in 3 days, pt unsure if she's been passing gas otherwise. She was noted to be hypotensive and have an elevated wbc count at the NH as well.  In the ED, BP 66/45 S/P LR started on  levo. Labs with wbc 19.29 CTAP with severe gastric distension, multiple foci of intraperitoneal free air and urinary bladder thickening with free air. Surgery consulted, no intervention for now, NGT placed for gastric decompression. GI was consulted for gastric distention and concern of ischemia on imaging.     Septic shock   Gastric distention s/p decompression   Pneumatosis and concern of gastric wall ischemia   free intraperitoneal air r/o perforation  S/P PEG 2/2023 post intubation, removed in NH.   s/p cholecystectomy, mildly dilated duct.   lactic acidosis   Chronic elevation of alk phos      Multiple clean based small ulcers were seen in the esophagus likely from NGT trauma. .  	10 cm Ulcer in the stomach body (Greater curvature). (Biopsy).  	Ulcers in the antrum and stomach body.  	Erosions in the stomach compatible with erosive gastritis. (Biopsy).  	Normal mucosa in the whole examined duodenum.    PLAN   - CT with Gastrografin, resume feeding if CT is unremarkable.    - PPI IV BID    - Repeat EGD in two months.  - dc suction   - Correct electrolytes   - Can resume necessary AC   - Abx per ID   - Surgery following   - Supportive care per primary team.   - Will follow      73-year-old female PMH A-fib on xarelto,, HTN, s/p cholecystectomy, congenital solitary kidney presents from NH to the ED for evaluation of weakness, decreased PO intake and abdominal distension. Has not had a bowel movement in 3 days, pt unsure if she's been passing gas otherwise. She was noted to be hypotensive and have an elevated wbc count at the NH as well.  In the ED, BP 66/45 S/P LR started on  levo. Labs with wbc 19.29 CTAP with severe gastric distension, multiple foci of intraperitoneal free air and urinary bladder thickening with free air. Surgery consulted, no intervention for now, NGT placed for gastric decompression. GI was consulted for gastric distention and concern of ischemia on imaging.     Septic shock   Gastric distention s/p decompression   Pneumatosis and concern of gastric wall ischemia   free intraperitoneal air r/o perforation  S/P PEG 2/2023 post intubation, removed in NH.   s/p cholecystectomy, mildly dilated duct.   lactic acidosis   Chronic elevation of alk phos      Multiple clean based small ulcers were seen in the esophagus likely from NGT trauma. .  	10 cm Ulcer in the stomach body (Greater curvature). (Biopsy).  	Ulcers in the antrum and stomach body.  	Erosions in the stomach compatible with erosive gastritis. (Biopsy).  	Normal mucosa in the whole examined duodenum.    PLAN   - CT with Gastrografin, resume feeding if CT is unremarkable.    - PPI IV BID    - Repeat EGD in two months.  - dc suction and monitor  - Serial abd exams  - Correct electrolytes   - Can resume necessary AC as deemed indicated per team  - Abx per ID   - Surgery following   - Supportive care per primary team  - Will follow

## 2024-11-08 NOTE — PROGRESS NOTE ADULT - ASSESSMENT
ASSESSMENT:  73-year-old female PMH A-fib on xarelto,  HTN, s/p cholecystectomy, congenital solitary kidney presents from NH to the ED for evaluation of weakness, decreased PO intake and abdominal distension. Found to have severe gastric distension with emphysematous changes, NG tube placed in ED with 3.1L initial output. Admitted to medical services for workup, actively resuscitated and decompressed via NG tube. Surgery consulted for gastric distension and air within gastric wall      PLAN:  - No acute surgical intervention   - c/w NGT in place to suction. Monitor output   - Please obtain a CT with PO contrast. Please clamp NGT first then give PO contrast, keep NGT clamped for 1-2 hours. Unclamp NGT after CT scan is performed.   - Rest of care per primary team

## 2024-11-08 NOTE — PROGRESS NOTE ADULT - ATTENDING COMMENTS
IMPRESSION:    Acute hypoxemic respiratory failure  Septic shock - improved  UTI  Severe gastric distension w/ pneumatosis along posterior gastric wall - R/o ischemia  Possible aspiration   Intraperitoneal free air  Thrombocytopenia   HAGMA  NOLA non oliguric  Afib on Xarelto  HO HTN  HO ESBL Proteus    Plan as outlined above

## 2024-11-08 NOTE — PROGRESS NOTE ADULT - ASSESSMENT
74 yo F N[facility] with h/o HTN, congenital solitary kidney, PAF on AC, admitted for weakness and poor oral intake a/w abdominal pain and distension.      # NOLA mostly prerenal   # leukocytosis sepsis / SBO  # Hypotension / shock     # solitary kidney   # hypernatremia  # hypercalcemia     - creat better at baseline   - check PTH vitamin D SPEP UPEP SFLC IF follow Ca levels   - follow BMP   - on merren and diflucan dose to GFR of 30 ml/min   - no need for RRT     will sign off recall PRN / call using TEAMS or on [phone]

## 2024-11-08 NOTE — PROGRESS NOTE ADULT - SUBJECTIVE AND OBJECTIVE BOX
Patient is a 73y old  Female who presents with a chief complaint of Sepsis     (07 Nov 2024 13:54)      Over Night Events:        ROS:     All ROS are negative except HPI         PHYSICAL EXAM    ICU Vital Signs Last 24 Hrs  T(C): 37 (08 Nov 2024 04:00), Max: 38.7 (07 Nov 2024 08:00)  T(F): 98.6 (08 Nov 2024 04:00), Max: 101.6 (07 Nov 2024 08:00)  HR: 89 (08 Nov 2024 07:00) (48 - 97)  BP: 105/67 (08 Nov 2024 07:00) (88/68 - 186/139)  BP(mean): 77 (08 Nov 2024 07:00) (65 - 156)  ABP: --  ABP(mean): --  RR: 15 (08 Nov 2024 07:00) (13 - 32)  SpO2: 96% (08 Nov 2024 07:00) (91% - 100%)    O2 Parameters below as of 08 Nov 2024 07:00  Patient On (Oxygen Delivery Method): ventilator    O2 Concentration (%): 40        CONSTITUTIONAL:  Elderly    ENT:   ETT    EYES:   Pupils equal,   Round and reactive to light.    CARDIAC:   Normal rate,   Regular rhythm.    No edema    RESPIRATORY:   Bilateral mechanical BS  Normal chest expansion  Not tachypneic,  No use of accessory muscles    GASTROINTESTINAL:  Abdomen soft,   Non-tender,   No guarding,   + BS    MUSCULOSKELETAL:   Range of motion is not limited,  No clubbing, cyanosis    NEUROLOGICAL:   Awake off sedation    SKIN:   Skin normal color for race,   Warm and dry and intact.   No evidence of rash.        11-07-24 @ 07:01  -  11-08-24 @ 07:00  --------------------------------------------------------  IN:    Dexmedetomidine: 39 mL    dextrose 5%: 4950 mL    dextrose 5% + sodium chloride 0.9%: 160 mL    FentaNYL: 555 mL    Free Water: 400 mL    IV PiggyBack: 50 mL  Total IN: 6154 mL    OUT:    Indwelling Catheter - Urethral (mL): 1335 mL    Nasogastric/Oral tube (mL): 200 mL  Total OUT: 1535 mL    Total NET: 4619 mL          LABS:                            8.4    14.61 )-----------( 144      ( 08 Nov 2024 04:07 )             27.5                                               11-08    139  |  107  |  33[H]  ----------------------------<  109[H]  4.0   |  25  |  0.9    Ca    10.5      08 Nov 2024 04:07  Mg     1.8     11-08    TPro  4.2[L]  /  Alb  2.4[L]  /  TBili  1.0  /  DBili  x   /  AST  48[H]  /  ALT  23  /  AlkPhos  169[H]  11-08      PT/INR - ( 07 Nov 2024 11:35 )   PT: 11.80 sec;   INR: 1.00 ratio         PTT - ( 07 Nov 2024 11:35 )  PTT:28.8 sec                                       Urinalysis Basic - ( 08 Nov 2024 04:07 )    Color: x / Appearance: x / SG: x / pH: x  Gluc: 109 mg/dL / Ketone: x  / Bili: x / Urobili: x   Blood: x / Protein: x / Nitrite: x   Leuk Esterase: x / RBC: x / WBC x   Sq Epi: x / Non Sq Epi: x / Bacteria: x                                                  LIVER FUNCTIONS - ( 08 Nov 2024 04:07 )  Alb: 2.4 g/dL / Pro: 4.2 g/dL / ALK PHOS: 169 U/L / ALT: 23 U/L / AST: 48 U/L / GGT: x                                                                                               Mode: AC/ CMV (Assist Control/ Continuous Mandatory Ventilation)  RR (machine): 14  TV (machine): 380  FiO2: 40  PEEP: 10  ITime: 0.9  MAP: 14  PIP: 34                                      ABG - ( 08 Nov 2024 03:22 )  pH, Arterial: 7.37  pH, Blood: x     /  pCO2: 46    /  pO2: 77    / HCO3: 27    / Base Excess: 0.8   /  SaO2: 95.1                MEDICATIONS  (STANDING):  chlorhexidine 0.12% Liquid 15 milliLiter(s) Oral Mucosa every 12 hours  chlorhexidine 2% Cloths 1 Application(s) Topical <User Schedule>  dexMEDEtomidine Infusion 0.05 MICROgram(s)/kG/Hr (1.15 mL/Hr) IV Continuous <Continuous>  dextrose 5%. 1000 milliLiter(s) (225 mL/Hr) IV Continuous <Continuous>  fentaNYL   Infusion 0.5 MICROgram(s)/kG/Hr (4.58 mL/Hr) IV Continuous <Continuous>  fluconAZOLE IVPB 200 milliGRAM(s) IV Intermittent every 24 hours  meropenem  IVPB 1000 milliGRAM(s) IV Intermittent every 12 hours  pantoprazole  Injectable 40 milliGRAM(s) IV Push two times a day    MEDICATIONS  (PRN):      New X-rays reviewed:                                                                                  ECHO    CXR interpreted by me:       Patient is a 73y old  Female who presents with a chief complaint of Sepsis     (07 Nov 2024 13:54)      Over Night Events: No events overnight.  Remain on MV on Fentanyl 3.        ROS:     All ROS are negative except HPI         PHYSICAL EXAM    ICU Vital Signs Last 24 Hrs  T(C): 37 (08 Nov 2024 04:00), Max: 38.7 (07 Nov 2024 08:00)  T(F): 98.6 (08 Nov 2024 04:00), Max: 101.6 (07 Nov 2024 08:00)  HR: 89 (08 Nov 2024 07:00) (48 - 97)  BP: 105/67 (08 Nov 2024 07:00) (88/68 - 186/139)  BP(mean): 77 (08 Nov 2024 07:00) (65 - 156)  ABP: --  ABP(mean): --  RR: 15 (08 Nov 2024 07:00) (13 - 32)  SpO2: 96% (08 Nov 2024 07:00) (91% - 100%)    O2 Parameters below as of 08 Nov 2024 07:00  Patient On (Oxygen Delivery Method): ventilator    O2 Concentration (%): 40        CONSTITUTIONAL:  Elderly    ENT:   ETT    EYES:   Pupils equal,   Round and reactive to light.    CARDIAC:   Normal rate,   Regular rhythm.    No edema    RESPIRATORY:   Bilateral mechanical BS  Normal chest expansion  Not tachypneic,  No use of accessory muscles    GASTROINTESTINAL:  Abdomen soft,   Non-tender,   No guarding,   + BS    MUSCULOSKELETAL:   Range of motion is not limited,  No clubbing, cyanosis    NEUROLOGICAL:   Awake    SKIN:   Skin normal color for race,   Warm and dry and intact.   No evidence of rash.        11-07-24 @ 07:01  -  11-08-24 @ 07:00  --------------------------------------------------------  IN:    Dexmedetomidine: 39 mL    dextrose 5%: 4950 mL    dextrose 5% + sodium chloride 0.9%: 160 mL    FentaNYL: 555 mL    Free Water: 400 mL    IV PiggyBack: 50 mL  Total IN: 6154 mL    OUT:    Indwelling Catheter - Urethral (mL): 1335 mL    Nasogastric/Oral tube (mL): 200 mL  Total OUT: 1535 mL    Total NET: 4619 mL          LABS:                            8.4    14.61 )-----------( 144      ( 08 Nov 2024 04:07 )             27.5                                               11-08    139  |  107  |  33[H]  ----------------------------<  109[H]  4.0   |  25  |  0.9    Ca    10.5      08 Nov 2024 04:07  Mg     1.8     11-08    TPro  4.2[L]  /  Alb  2.4[L]  /  TBili  1.0  /  DBili  x   /  AST  48[H]  /  ALT  23  /  AlkPhos  169[H]  11-08      PT/INR - ( 07 Nov 2024 11:35 )   PT: 11.80 sec;   INR: 1.00 ratio         PTT - ( 07 Nov 2024 11:35 )  PTT:28.8 sec                                       Urinalysis Basic - ( 08 Nov 2024 04:07 )    Color: x / Appearance: x / SG: x / pH: x  Gluc: 109 mg/dL / Ketone: x  / Bili: x / Urobili: x   Blood: x / Protein: x / Nitrite: x   Leuk Esterase: x / RBC: x / WBC x   Sq Epi: x / Non Sq Epi: x / Bacteria: x                                                  LIVER FUNCTIONS - ( 08 Nov 2024 04:07 )  Alb: 2.4 g/dL / Pro: 4.2 g/dL / ALK PHOS: 169 U/L / ALT: 23 U/L / AST: 48 U/L / GGT: x                                                                                               Mode: AC/ CMV (Assist Control/ Continuous Mandatory Ventilation)  RR (machine): 14  TV (machine): 380  FiO2: 40  PEEP: 10  ITime: 0.9  MAP: 14  PIP: 34                                      ABG - ( 08 Nov 2024 03:22 )  pH, Arterial: 7.37  pH, Blood: x     /  pCO2: 46    /  pO2: 77    / HCO3: 27    / Base Excess: 0.8   /  SaO2: 95.1                MEDICATIONS  (STANDING):  chlorhexidine 0.12% Liquid 15 milliLiter(s) Oral Mucosa every 12 hours  chlorhexidine 2% Cloths 1 Application(s) Topical <User Schedule>  dexMEDEtomidine Infusion 0.05 MICROgram(s)/kG/Hr (1.15 mL/Hr) IV Continuous <Continuous>  dextrose 5%. 1000 milliLiter(s) (225 mL/Hr) IV Continuous <Continuous>  fentaNYL   Infusion 0.5 MICROgram(s)/kG/Hr (4.58 mL/Hr) IV Continuous <Continuous>  fluconAZOLE IVPB 200 milliGRAM(s) IV Intermittent every 24 hours  meropenem  IVPB 1000 milliGRAM(s) IV Intermittent every 12 hours  pantoprazole  Injectable 40 milliGRAM(s) IV Push two times a day    MEDICATIONS  (PRN):      New X-rays reviewed:                                                                                  ECHO    CXR interpreted by me:       Patient is a 73y old  Female who presents with a chief complaint of Sepsis     (07 Nov 2024 13:54)      Over Night Events:   Remain on MV sedated.          ROS:     All ROS are negative except HPI         PHYSICAL EXAM    ICU Vital Signs Last 24 Hrs  T(C): 37 (08 Nov 2024 04:00), Max: 38.7 (07 Nov 2024 08:00)  T(F): 98.6 (08 Nov 2024 04:00), Max: 101.6 (07 Nov 2024 08:00)  HR: 89 (08 Nov 2024 07:00) (48 - 97)  BP: 105/67 (08 Nov 2024 07:00) (88/68 - 186/139)  BP(mean): 77 (08 Nov 2024 07:00) (65 - 156)  ABP: --  ABP(mean): --  RR: 15 (08 Nov 2024 07:00) (13 - 32)  SpO2: 96% (08 Nov 2024 07:00) (91% - 100%)    O2 Parameters below as of 08 Nov 2024 07:00  Patient On (Oxygen Delivery Method): ventilator    O2 Concentration (%): 40        CONSTITUTIONAL:  Elderly    ENT:   ETT    EYES:   Pupils equal,   Round and reactive to light.    CARDIAC:   Normal rate,   Regular rhythm.    No edema    RESPIRATORY:   Bilateral mechanical BS  Normal chest expansion  Not tachypneic,  No use of accessory muscles    GASTROINTESTINAL:  Abdomen soft,   Non-tender,   No guarding,   + BS    MUSCULOSKELETAL:   Range of motion is not limited,  No clubbing, cyanosis    NEUROLOGICAL:   Awake    SKIN:   Skin normal color for race,   Warm and dry and intact.   No evidence of rash.        11-07-24 @ 07:01  -  11-08-24 @ 07:00  --------------------------------------------------------  IN:    Dexmedetomidine: 39 mL    dextrose 5%: 4950 mL    dextrose 5% + sodium chloride 0.9%: 160 mL    FentaNYL: 555 mL    Free Water: 400 mL    IV PiggyBack: 50 mL  Total IN: 6154 mL    OUT:    Indwelling Catheter - Urethral (mL): 1335 mL    Nasogastric/Oral tube (mL): 200 mL  Total OUT: 1535 mL    Total NET: 4619 mL          LABS:                            8.4    14.61 )-----------( 144      ( 08 Nov 2024 04:07 )             27.5                                               11-08    139  |  107  |  33[H]  ----------------------------<  109[H]  4.0   |  25  |  0.9    Ca    10.5      08 Nov 2024 04:07  Mg     1.8     11-08    TPro  4.2[L]  /  Alb  2.4[L]  /  TBili  1.0  /  DBili  x   /  AST  48[H]  /  ALT  23  /  AlkPhos  169[H]  11-08      PT/INR - ( 07 Nov 2024 11:35 )   PT: 11.80 sec;   INR: 1.00 ratio         PTT - ( 07 Nov 2024 11:35 )  PTT:28.8 sec                                       Urinalysis Basic - ( 08 Nov 2024 04:07 )    Color: x / Appearance: x / SG: x / pH: x  Gluc: 109 mg/dL / Ketone: x  / Bili: x / Urobili: x   Blood: x / Protein: x / Nitrite: x   Leuk Esterase: x / RBC: x / WBC x   Sq Epi: x / Non Sq Epi: x / Bacteria: x                                                  LIVER FUNCTIONS - ( 08 Nov 2024 04:07 )  Alb: 2.4 g/dL / Pro: 4.2 g/dL / ALK PHOS: 169 U/L / ALT: 23 U/L / AST: 48 U/L / GGT: x                                                                                               Mode: AC/ CMV (Assist Control/ Continuous Mandatory Ventilation)  RR (machine): 14  TV (machine): 380  FiO2: 40  PEEP: 10  ITime: 0.9  MAP: 14  PIP: 34                                      ABG - ( 08 Nov 2024 03:22 )  pH, Arterial: 7.37  pH, Blood: x     /  pCO2: 46    /  pO2: 77    / HCO3: 27    / Base Excess: 0.8   /  SaO2: 95.1                MEDICATIONS  (STANDING):  chlorhexidine 0.12% Liquid 15 milliLiter(s) Oral Mucosa every 12 hours  chlorhexidine 2% Cloths 1 Application(s) Topical <User Schedule>  dexMEDEtomidine Infusion 0.05 MICROgram(s)/kG/Hr (1.15 mL/Hr) IV Continuous <Continuous>  dextrose 5%. 1000 milliLiter(s) (225 mL/Hr) IV Continuous <Continuous>  fentaNYL   Infusion 0.5 MICROgram(s)/kG/Hr (4.58 mL/Hr) IV Continuous <Continuous>  fluconAZOLE IVPB 200 milliGRAM(s) IV Intermittent every 24 hours  meropenem  IVPB 1000 milliGRAM(s) IV Intermittent every 12 hours  pantoprazole  Injectable 40 milliGRAM(s) IV Push two times a day    MEDICATIONS  (PRN):      New X-rays reviewed:                                                                                  ECHO

## 2024-11-08 NOTE — PROGRESS NOTE ADULT - SUBJECTIVE AND OBJECTIVE BOX
VASCULAR SURGERY PROGRESS NOTE    CC:   Hospital Day # 6    consult for: r/o mesenteric ischemia.     Events of past 24 hours: Patient was seen and examined at bedside. Patient remains sedated and intubated. off pressors and on mechanical ventilation 40/10. She went for an EGD with [facility] yesterday 11/07.      ROS otherwise negative except per subjective and HPI      PAST MEDICAL & SURGICAL HISTORY:  HTN (hypertension)      Diabetes mellitus      Obesity      Gastroesophageal reflux disease      Active asthma      Generalized OA      Afib      H/O hernia repair  2011 and revised in 2013      History of cholecystectomy          Vital Signs Last 24 Hrs  T(C): 37.5 (08 Nov 2024 08:00), Max: 37.9 (07 Nov 2024 18:00)  T(F): 99.5 (08 Nov 2024 08:00), Max: 100.3 (07 Nov 2024 18:00)  HR: 125 (08 Nov 2024 11:00) (48 - 125)  BP: 155/79 (08 Nov 2024 11:00) (88/68 - 186/139)  BP(mean): 101 (08 Nov 2024 11:00) (65 - 156)  RR: 24 (08 Nov 2024 11:00) (13 - 32)  SpO2: 95% (08 Nov 2024 11:00) (91% - 100%)    Parameters below as of 08 Nov 2024 08:00  Patient On (Oxygen Delivery Method): ventilator    O2 Concentration (%): 40    Pain (0-10):            Pain Control Adequate: [] YES [] N    Diet:    I&O's Detail    07 Nov 2024 07:01  -  08 Nov 2024 07:00  --------------------------------------------------------  IN:    Dexmedetomidine: 39 mL    dextrose 5%: 4950 mL    dextrose 5% + sodium chloride 0.9%: 160 mL    FentaNYL: 555 mL    Free Water: 400 mL    IV PiggyBack: 150 mL  Total IN: 6254 mL    OUT:    Indwelling Catheter - Urethral (mL): 1885 mL    Nasogastric/Oral tube (mL): 250 mL  Total OUT: 2135 mL    Total NET: 4119 mL      08 Nov 2024 07:01  -  08 Nov 2024 11:23  --------------------------------------------------------  IN:    dextrose 5%: 225 mL    FentaNYL: 27.5 mL  Total IN: 252.5 mL    OUT:    Indwelling Catheter - Urethral (mL): 150 mL  Total OUT: 150 mL    Total NET: 102.5 mL      PHYSICAL EXAM  Appearance: Intubated 	  Respiratory: Chest rise equal bilaterally, no labored breathing.  Gastrointestinal:  Soft, Non-tender, non distended.   Skin: No rashes, No ecchymoses, No cyanosis  Extremities: Normal range of motion, No clubbing, cyanosis or edema    MEDICATIONS:   MEDICATIONS  (STANDING):  chlorhexidine 0.12% Liquid 15 milliLiter(s) Oral Mucosa every 12 hours  chlorhexidine 2% Cloths 1 Application(s) Topical <User Schedule>  dexMEDEtomidine Infusion 0.05 MICROgram(s)/kG/Hr (1.15 mL/Hr) IV Continuous <Continuous>  diatrizoate meglumine/diatrizoate sodium. 30 milliLiter(s) Oral once  enoxaparin Injectable 90 milliGRAM(s) SubCutaneous every 12 hours  fentaNYL   Infusion 0.5 MICROgram(s)/kG/Hr (4.58 mL/Hr) IV Continuous <Continuous>  fluconAZOLE IVPB 200 milliGRAM(s) IV Intermittent every 24 hours  meropenem  IVPB 1000 milliGRAM(s) IV Intermittent every 8 hours  pantoprazole  Injectable 40 milliGRAM(s) IV Push two times a day    MEDICATIONS  (PRN):      DVT PROPHYLAXIS: [] YES [] NO  GI PROPHYLAXIS: [] YES [] NO  ANTIPLATELETS: [] YES [] NO  ANTICOAGULATION: [] YES [] NO  ANTIBIOTICS: [] YES [] NO    LAB/STUDIES:                        8.4    14.61 )-----------( 144      ( 08 Nov 2024 04:07 )             27.5     11-08    139  |  107  |  33[H]  ----------------------------<  109[H]  4.0   |  25  |  0.9    Ca    10.5      08 Nov 2024 04:07  Mg     1.8     11-08    TPro  4.2[L]  /  Alb  2.4[L]  /  TBili  1.0  /  DBili  x   /  AST  48[H]  /  ALT  23  /  AlkPhos  169[H]  11-08    PT/INR - ( 07 Nov 2024 11:35 )   PT: 11.80 sec;   INR: 1.00 ratio         PTT - ( 07 Nov 2024 11:35 )  PTT:28.8 sec  LIVER FUNCTIONS - ( 08 Nov 2024 04:07 )  Alb: 2.4 g/dL / Pro: 4.2 g/dL / ALK PHOS: 169 U/L / ALT: 23 U/L / AST: 48 U/L / GGT: x             Urinalysis Basic - ( 08 Nov 2024 04:07 )    Color: x / Appearance: x / SG: x / pH: x  Gluc: 109 mg/dL / Ketone: x  / Bili: x / Urobili: x   Blood: x / Protein: x / Nitrite: x   Leuk Esterase: x / RBC: x / WBC x   Sq Epi: x / Non Sq Epi: x / Bacteria:          ABG - ( 08 Nov 2024 03:22 )  pH, Arterial: 7.37  pH, Blood: x     /  pCO2: 46    /  pO2: 77    / HCO3: 27    / Base Excess: 0.8   /  SaO2: 95.1        ASSESSMENT:  73-year-old female PMH A-fib on xarelto,  HTN, s/p cholecystectomy, congenital solitary kidney presents from NH to the ED for evaluation of weakness, decreased PO intake and abdominal distension. Found to have severe gastric distension with emphysematous changes, NG tube placed in ED with 3.1L initial output. Admitted to medical services for workup, actively resuscitated and decompressed via NG tube. Vascular surgery were consulted to rule out mesenteric ischemia. recommended CTA A/P which was deferred per primary team give her NOLA. Patient is S/P EGD [facility] on 11/07 with a finding of 10 cm gastric ulcer with erosive gastritis and no ischemic changes.     Plan:  - I reviewed labs  - I reviewed radiology imagings  - I personally visualized the imagings  - I discussed the findings and imagings with the vascular fellow, Dr. Jorgensen and Agapito REILLY   - Given recent EGD assessment, the most likely cause of her initial presentation of abdominal pain can be attributed to that.   - CTA A/P per primary team.   - Recall vascular surgery as needed.     VASCULAR SURGERY SPECTRA: [id number]   VASCULAR SURGERY PROGRESS NOTE    CC:   Hospital Day # 6    consult for: r/o mesenteric ischemia.     Events of past 24 hours: Patient was seen and examined at bedside. Patient remains sedated and intubated. off pressors and on mechanical ventilation 40/10. She went for an EGD with [facility] yesterday 11/07.      ROS otherwise negative except per subjective and HPI      PAST MEDICAL & SURGICAL HISTORY:  HTN (hypertension)      Diabetes mellitus      Obesity      Gastroesophageal reflux disease      Active asthma      Generalized OA      Afib      H/O hernia repair  2011 and revised in 2013      History of cholecystectomy          Vital Signs Last 24 Hrs  T(C): 37.5 (08 Nov 2024 08:00), Max: 37.9 (07 Nov 2024 18:00)  T(F): 99.5 (08 Nov 2024 08:00), Max: 100.3 (07 Nov 2024 18:00)  HR: 125 (08 Nov 2024 11:00) (48 - 125)  BP: 155/79 (08 Nov 2024 11:00) (88/68 - 186/139)  BP(mean): 101 (08 Nov 2024 11:00) (65 - 156)  RR: 24 (08 Nov 2024 11:00) (13 - 32)  SpO2: 95% (08 Nov 2024 11:00) (91% - 100%)    Parameters below as of 08 Nov 2024 08:00  Patient On (Oxygen Delivery Method): ventilator    O2 Concentration (%): 40    Pain (0-10):            Pain Control Adequate: [] YES [] N    Diet:    I&O's Detail    07 Nov 2024 07:01  -  08 Nov 2024 07:00  --------------------------------------------------------  IN:    Dexmedetomidine: 39 mL    dextrose 5%: 4950 mL    dextrose 5% + sodium chloride 0.9%: 160 mL    FentaNYL: 555 mL    Free Water: 400 mL    IV PiggyBack: 150 mL  Total IN: 6254 mL    OUT:    Indwelling Catheter - Urethral (mL): 1885 mL    Nasogastric/Oral tube (mL): 250 mL  Total OUT: 2135 mL    Total NET: 4119 mL      08 Nov 2024 07:01  -  08 Nov 2024 11:23  --------------------------------------------------------  IN:    dextrose 5%: 225 mL    FentaNYL: 27.5 mL  Total IN: 252.5 mL    OUT:    Indwelling Catheter - Urethral (mL): 150 mL  Total OUT: 150 mL    Total NET: 102.5 mL      PHYSICAL EXAM  Appearance: Intubated 	  Respiratory: Chest rise equal bilaterally, no labored breathing.  Gastrointestinal:  Soft, Non-tender, non distended.   Skin: No rashes, No ecchymoses, No cyanosis  Extremities: Normal range of motion, No clubbing, cyanosis or edema    MEDICATIONS:   MEDICATIONS  (STANDING):  chlorhexidine 0.12% Liquid 15 milliLiter(s) Oral Mucosa every 12 hours  chlorhexidine 2% Cloths 1 Application(s) Topical <User Schedule>  dexMEDEtomidine Infusion 0.05 MICROgram(s)/kG/Hr (1.15 mL/Hr) IV Continuous <Continuous>  diatrizoate meglumine/diatrizoate sodium. 30 milliLiter(s) Oral once  enoxaparin Injectable 90 milliGRAM(s) SubCutaneous every 12 hours  fentaNYL   Infusion 0.5 MICROgram(s)/kG/Hr (4.58 mL/Hr) IV Continuous <Continuous>  fluconAZOLE IVPB 200 milliGRAM(s) IV Intermittent every 24 hours  meropenem  IVPB 1000 milliGRAM(s) IV Intermittent every 8 hours  pantoprazole  Injectable 40 milliGRAM(s) IV Push two times a day    MEDICATIONS  (PRN):      DVT PROPHYLAXIS: [] YES [] NO  GI PROPHYLAXIS: [] YES [] NO  ANTIPLATELETS: [] YES [] NO  ANTICOAGULATION: [] YES [] NO  ANTIBIOTICS: [] YES [] NO    LAB/STUDIES:                        8.4    14.61 )-----------( 144      ( 08 Nov 2024 04:07 )             27.5     11-08    139  |  107  |  33[H]  ----------------------------<  109[H]  4.0   |  25  |  0.9    Ca    10.5      08 Nov 2024 04:07  Mg     1.8     11-08    TPro  4.2[L]  /  Alb  2.4[L]  /  TBili  1.0  /  DBili  x   /  AST  48[H]  /  ALT  23  /  AlkPhos  169[H]  11-08    PT/INR - ( 07 Nov 2024 11:35 )   PT: 11.80 sec;   INR: 1.00 ratio         PTT - ( 07 Nov 2024 11:35 )  PTT:28.8 sec  LIVER FUNCTIONS - ( 08 Nov 2024 04:07 )  Alb: 2.4 g/dL / Pro: 4.2 g/dL / ALK PHOS: 169 U/L / ALT: 23 U/L / AST: 48 U/L / GGT: x             Urinalysis Basic - ( 08 Nov 2024 04:07 )    Color: x / Appearance: x / SG: x / pH: x  Gluc: 109 mg/dL / Ketone: x  / Bili: x / Urobili: x   Blood: x / Protein: x / Nitrite: x   Leuk Esterase: x / RBC: x / WBC x   Sq Epi: x / Non Sq Epi: x / Bacteria:          ABG - ( 08 Nov 2024 03:22 )  pH, Arterial: 7.37  pH, Blood: x     /  pCO2: 46    /  pO2: 77    / HCO3: 27    / Base Excess: 0.8   /  SaO2: 95.1        ASSESSMENT:  73-year-old female PMH A-fib on xarelto,  HTN, s/p cholecystectomy, congenital solitary kidney presents from NH to the ED for evaluation of weakness, decreased PO intake and abdominal distension. Found to have severe gastric distension with emphysematous changes, NG tube placed in ED with 3.1L initial output. Admitted to medical services for workup, actively resuscitated and decompressed via NG tube. Vascular surgery were consulted to rule out mesenteric ischemia. recommended CTA A/P which was deferred per primary team give her NOLA. Patient is S/P EGD [facility] on 11/07 with a finding of 10 cm gastric ulcer with erosive gastritis and no ischemic changes.     Plan:  - I reviewed labs  - I reviewed radiology imagings  - I personally visualized the imagings  - I discussed the findings and imagings with the vascular fellow, Dr. Jorgensen and Agapito REILLY   - Given recent EGD assessment, the most likely cause of her initial presentation of abdominal pain can be attributed to that.   - CTA A/P per primary team. No needed for vascular assessment.   - Recall vascular surgery as needed.     VASCULAR SURGERY SPECTRA: [id number]   VASCULAR SURGERY PROGRESS NOTE    CC:   Hospital Day # 6    consult for: r/o mesenteric ischemia.     Events of past 24 hours: Patient was seen and examined at bedside. Patient remains sedated and intubated. off pressors and on mechanical ventilation 40/10. She went for an EGD with [facility] yesterday 11/07.      ROS otherwise negative except per subjective and HPI      PAST MEDICAL & SURGICAL HISTORY:  HTN (hypertension)      Diabetes mellitus      Obesity      Gastroesophageal reflux disease      Active asthma      Generalized OA      Afib      H/O hernia repair  2011 and revised in 2013      History of cholecystectomy          Vital Signs Last 24 Hrs  T(C): 37.5 (08 Nov 2024 08:00), Max: 37.9 (07 Nov 2024 18:00)  T(F): 99.5 (08 Nov 2024 08:00), Max: 100.3 (07 Nov 2024 18:00)  HR: 125 (08 Nov 2024 11:00) (48 - 125)  BP: 155/79 (08 Nov 2024 11:00) (88/68 - 186/139)  BP(mean): 101 (08 Nov 2024 11:00) (65 - 156)  RR: 24 (08 Nov 2024 11:00) (13 - 32)  SpO2: 95% (08 Nov 2024 11:00) (91% - 100%)    Parameters below as of 08 Nov 2024 08:00  Patient On (Oxygen Delivery Method): ventilator    O2 Concentration (%): 40    Pain (0-10):            Pain Control Adequate: [] YES [] N    Diet:    I&O's Detail    07 Nov 2024 07:01  -  08 Nov 2024 07:00  --------------------------------------------------------  IN:    Dexmedetomidine: 39 mL    dextrose 5%: 4950 mL    dextrose 5% + sodium chloride 0.9%: 160 mL    FentaNYL: 555 mL    Free Water: 400 mL    IV PiggyBack: 150 mL  Total IN: 6254 mL    OUT:    Indwelling Catheter - Urethral (mL): 1885 mL    Nasogastric/Oral tube (mL): 250 mL  Total OUT: 2135 mL    Total NET: 4119 mL      08 Nov 2024 07:01  -  08 Nov 2024 11:23  --------------------------------------------------------  IN:    dextrose 5%: 225 mL    FentaNYL: 27.5 mL  Total IN: 252.5 mL    OUT:    Indwelling Catheter - Urethral (mL): 150 mL  Total OUT: 150 mL    Total NET: 102.5 mL      PHYSICAL EXAM  Appearance: Intubated 	  Respiratory: Chest rise equal bilaterally, no labored breathing.  Gastrointestinal:  Soft, Non-tender, non distended.   Skin: No rashes, No ecchymoses, No cyanosis  Extremities: Normal range of motion, No clubbing, cyanosis or edema    MEDICATIONS:   MEDICATIONS  (STANDING):  chlorhexidine 0.12% Liquid 15 milliLiter(s) Oral Mucosa every 12 hours  chlorhexidine 2% Cloths 1 Application(s) Topical <User Schedule>  dexMEDEtomidine Infusion 0.05 MICROgram(s)/kG/Hr (1.15 mL/Hr) IV Continuous <Continuous>  diatrizoate meglumine/diatrizoate sodium. 30 milliLiter(s) Oral once  enoxaparin Injectable 90 milliGRAM(s) SubCutaneous every 12 hours  fentaNYL   Infusion 0.5 MICROgram(s)/kG/Hr (4.58 mL/Hr) IV Continuous <Continuous>  fluconAZOLE IVPB 200 milliGRAM(s) IV Intermittent every 24 hours  meropenem  IVPB 1000 milliGRAM(s) IV Intermittent every 8 hours  pantoprazole  Injectable 40 milliGRAM(s) IV Push two times a day    MEDICATIONS  (PRN):    LAB/STUDIES:                        8.4    14.61 )-----------( 144      ( 08 Nov 2024 04:07 )             27.5     11-08    139  |  107  |  33[H]  ----------------------------<  109[H]  4.0   |  25  |  0.9    Ca    10.5      08 Nov 2024 04:07  Mg     1.8     11-08    TPro  4.2[L]  /  Alb  2.4[L]  /  TBili  1.0  /  DBili  x   /  AST  48[H]  /  ALT  23  /  AlkPhos  169[H]  11-08    PT/INR - ( 07 Nov 2024 11:35 )   PT: 11.80 sec;   INR: 1.00 ratio         PTT - ( 07 Nov 2024 11:35 )  PTT:28.8 sec  LIVER FUNCTIONS - ( 08 Nov 2024 04:07 )  Alb: 2.4 g/dL / Pro: 4.2 g/dL / ALK PHOS: 169 U/L / ALT: 23 U/L / AST: 48 U/L / GGT: x             Urinalysis Basic - ( 08 Nov 2024 04:07 )    Color: x / Appearance: x / SG: x / pH: x  Gluc: 109 mg/dL / Ketone: x  / Bili: x / Urobili: x   Blood: x / Protein: x / Nitrite: x   Leuk Esterase: x / RBC: x / WBC x   Sq Epi: x / Non Sq Epi: x / Bacteria:          ABG - ( 08 Nov 2024 03:22 )  pH, Arterial: 7.37  pH, Blood: x     /  pCO2: 46    /  pO2: 77    / HCO3: 27    / Base Excess: 0.8   /  SaO2: 95.1        ASSESSMENT:  73-year-old female PMH A-fib on xarelto,  HTN, s/p cholecystectomy, congenital solitary kidney presents from NH to the ED for evaluation of weakness, decreased PO intake and abdominal distension. Found to have severe gastric distension with emphysematous changes, NG tube placed in ED with 3.1L initial output. Admitted to medical services for workup, actively resuscitated and decompressed via NG tube. Vascular surgery were consulted to rule out mesenteric ischemia. recommended CTA A/P which was deferred per primary team give her NOLA. Patient is S/P EGD [facility] on 11/07 with a finding of 10 cm gastric ulcer with erosive gastritis and no ischemic changes.     Plan:  - I reviewed labs  - I reviewed radiology imagings  - I personally visualized the imagings  - I discussed the findings and imagings with the vascular fellow, Dr. Jorgensen and Agapito REILLY   - Given recent EGD assessment, the most likely cause of her initial presentation of abdominal pain can be attributed to that.   - CTA A/P per primary team. No needed for vascular assessment.   - Recall vascular surgery as needed.     VASCULAR SURGERY SPECTRA: [id number]   VASCULAR SURGERY PROGRESS NOTE    CC:   Hospital Day # 6    consult for: r/o mesenteric ischemia.     Events of past 24 hours: Patient was seen and examined at bedside. Patient remains sedated and intubated. off pressors and on mechanical ventilation 40/10. She went for an EGD with [facility] yesterday 11/07.      ROS otherwise negative except per subjective and HPI      PAST MEDICAL & SURGICAL HISTORY:  HTN (hypertension)      Diabetes mellitus      Obesity      Gastroesophageal reflux disease      Active asthma      Generalized OA      Afib      H/O hernia repair  2011 and revised in 2013      History of cholecystectomy          Vital Signs Last 24 Hrs  T(C): 37.5 (08 Nov 2024 08:00), Max: 37.9 (07 Nov 2024 18:00)  T(F): 99.5 (08 Nov 2024 08:00), Max: 100.3 (07 Nov 2024 18:00)  HR: 125 (08 Nov 2024 11:00) (48 - 125)  BP: 155/79 (08 Nov 2024 11:00) (88/68 - 186/139)  BP(mean): 101 (08 Nov 2024 11:00) (65 - 156)  RR: 24 (08 Nov 2024 11:00) (13 - 32)  SpO2: 95% (08 Nov 2024 11:00) (91% - 100%)    Parameters below as of 08 Nov 2024 08:00  Patient On (Oxygen Delivery Method): ventilator    O2 Concentration (%): 40    Pain (0-10):            Pain Control Adequate: [] YES [] N    Diet:    I&O's Detail    07 Nov 2024 07:01  -  08 Nov 2024 07:00  --------------------------------------------------------  IN:    Dexmedetomidine: 39 mL    dextrose 5%: 4950 mL    dextrose 5% + sodium chloride 0.9%: 160 mL    FentaNYL: 555 mL    Free Water: 400 mL    IV PiggyBack: 150 mL  Total IN: 6254 mL    OUT:    Indwelling Catheter - Urethral (mL): 1885 mL    Nasogastric/Oral tube (mL): 250 mL  Total OUT: 2135 mL    Total NET: 4119 mL      08 Nov 2024 07:01  -  08 Nov 2024 11:23  --------------------------------------------------------  IN:    dextrose 5%: 225 mL    FentaNYL: 27.5 mL  Total IN: 252.5 mL    OUT:    Indwelling Catheter - Urethral (mL): 150 mL  Total OUT: 150 mL    Total NET: 102.5 mL      PHYSICAL EXAM  Appearance: Intubated 	  Respiratory: Chest rise equal bilaterally, no labored breathing.  Gastrointestinal:  Soft, Non-tender, non distended.   Skin: No rashes, No ecchymoses, No cyanosis  Extremities: Normal range of motion, No clubbing, cyanosis or edema    MEDICATIONS:   MEDICATIONS  (STANDING):  chlorhexidine 0.12% Liquid 15 milliLiter(s) Oral Mucosa every 12 hours  chlorhexidine 2% Cloths 1 Application(s) Topical <User Schedule>  dexMEDEtomidine Infusion 0.05 MICROgram(s)/kG/Hr (1.15 mL/Hr) IV Continuous <Continuous>  diatrizoate meglumine/diatrizoate sodium. 30 milliLiter(s) Oral once  enoxaparin Injectable 90 milliGRAM(s) SubCutaneous every 12 hours  fentaNYL   Infusion 0.5 MICROgram(s)/kG/Hr (4.58 mL/Hr) IV Continuous <Continuous>  fluconAZOLE IVPB 200 milliGRAM(s) IV Intermittent every 24 hours  meropenem  IVPB 1000 milliGRAM(s) IV Intermittent every 8 hours  pantoprazole  Injectable 40 milliGRAM(s) IV Push two times a day    MEDICATIONS  (PRN):    LAB/STUDIES:                        8.4    14.61 )-----------( 144      ( 08 Nov 2024 04:07 )             27.5     11-08    139  |  107  |  33[H]  ----------------------------<  109[H]  4.0   |  25  |  0.9    Ca    10.5      08 Nov 2024 04:07  Mg     1.8     11-08    TPro  4.2[L]  /  Alb  2.4[L]  /  TBili  1.0  /  DBili  x   /  AST  48[H]  /  ALT  23  /  AlkPhos  169[H]  11-08    PT/INR - ( 07 Nov 2024 11:35 )   PT: 11.80 sec;   INR: 1.00 ratio         PTT - ( 07 Nov 2024 11:35 )  PTT:28.8 sec  LIVER FUNCTIONS - ( 08 Nov 2024 04:07 )  Alb: 2.4 g/dL / Pro: 4.2 g/dL / ALK PHOS: 169 U/L / ALT: 23 U/L / AST: 48 U/L / GGT: x             Urinalysis Basic - ( 08 Nov 2024 04:07 )    Color: x / Appearance: x / SG: x / pH: x  Gluc: 109 mg/dL / Ketone: x  / Bili: x / Urobili: x   Blood: x / Protein: x / Nitrite: x   Leuk Esterase: x / RBC: x / WBC x   Sq Epi: x / Non Sq Epi: x / Bacteria:          ABG - ( 08 Nov 2024 03:22 )  pH, Arterial: 7.37  pH, Blood: x     /  pCO2: 46    /  pO2: 77    / HCO3: 27    / Base Excess: 0.8   /  SaO2: 95.1        ASSESSMENT:  73-year-old female PMH A-fib on xarelto,  HTN, s/p cholecystectomy, congenital solitary kidney presents from NH to the ED for evaluation of weakness, decreased PO intake and abdominal distension. Found to have severe gastric distension with emphysematous changes, NG tube placed in ED with 3.1L initial output. Admitted to medical services for workup, actively resuscitated and decompressed via NG tube. Vascular surgery were consulted to rule out mesenteric ischemia. recommended CTA A/P which was deferred per primary team give her NOLA. Patient is S/P [facility]GD [facility] on 11/07 with a finding of 10 cm gastric ulcer with erosive gastritis and no ischemic changes.     Plan:  - I reviewed labs  - I reviewed radiology imagings  - I personally visualized the imagings  - I discussed the findings and imagings with the vascular fellow, Dr. Jorgensen and Agapito REILLY   - Given recent EGD assessment, the most likely cause of her initial presentation of abdominal pain can be attributed to that.   - CTA A/P per primary team. Not needed for vascular assessment.   - Recall vascular surgery as needed.     VASCULAR SURGERY SPECTRA: [id number]

## 2024-11-09 LAB
ALBUMIN SERPL ELPH-MCNC: 2.4 G/DL — LOW (ref 3.5–5.2)
ALP SERPL-CCNC: 176 U/L — HIGH (ref 30–115)
ALT FLD-CCNC: 20 U/L — SIGNIFICANT CHANGE UP (ref 0–41)
ANION GAP SERPL CALC-SCNC: 9 MMOL/L — SIGNIFICANT CHANGE UP (ref 7–14)
AST SERPL-CCNC: 19 U/L — SIGNIFICANT CHANGE UP (ref 0–41)
BASE EXCESS BLDA CALC-SCNC: 2.2 MMOL/L — SIGNIFICANT CHANGE UP (ref -2–3)
BASOPHILS # BLD AUTO: 0.05 K/UL — SIGNIFICANT CHANGE UP (ref 0–0.2)
BASOPHILS NFR BLD AUTO: 0.3 % — SIGNIFICANT CHANGE UP (ref 0–1)
BILIRUB SERPL-MCNC: 0.6 MG/DL — SIGNIFICANT CHANGE UP (ref 0.2–1.2)
BUN SERPL-MCNC: 23 MG/DL — HIGH (ref 10–20)
CALCIUM SERPL-MCNC: 10.4 MG/DL — SIGNIFICANT CHANGE UP (ref 8.4–10.5)
CHLORIDE SERPL-SCNC: 109 MMOL/L — SIGNIFICANT CHANGE UP (ref 98–110)
CO2 SERPL-SCNC: 25 MMOL/L — SIGNIFICANT CHANGE UP (ref 17–32)
CREAT SERPL-MCNC: 0.8 MG/DL — SIGNIFICANT CHANGE UP (ref 0.7–1.5)
EGFR: 79 ML/MIN/1.73M2 — SIGNIFICANT CHANGE UP
EOSINOPHIL # BLD AUTO: 0.46 K/UL — SIGNIFICANT CHANGE UP (ref 0–0.7)
EOSINOPHIL NFR BLD AUTO: 3 % — SIGNIFICANT CHANGE UP (ref 0–8)
GLUCOSE BLDC GLUCOMTR-MCNC: 71 MG/DL — SIGNIFICANT CHANGE UP (ref 70–99)
GLUCOSE BLDC GLUCOMTR-MCNC: 78 MG/DL — SIGNIFICANT CHANGE UP (ref 70–99)
GLUCOSE SERPL-MCNC: 76 MG/DL — SIGNIFICANT CHANGE UP (ref 70–99)
GRAM STN FLD: ABNORMAL
HCO3 BLDA-SCNC: 28 MMOL/L — SIGNIFICANT CHANGE UP (ref 21–28)
HCT VFR BLD CALC: 25 % — LOW (ref 37–47)
HGB BLD-MCNC: 7.7 G/DL — LOW (ref 12–16)
HOROWITZ INDEX BLDA+IHG-RTO: 40 — SIGNIFICANT CHANGE UP
IMM GRANULOCYTES NFR BLD AUTO: 1.2 % — HIGH (ref 0.1–0.3)
LYMPHOCYTES # BLD AUTO: 1.52 K/UL — SIGNIFICANT CHANGE UP (ref 1.2–3.4)
LYMPHOCYTES # BLD AUTO: 10 % — LOW (ref 20.5–51.1)
MAGNESIUM SERPL-MCNC: 1.5 MG/DL — LOW (ref 1.8–2.4)
MCHC RBC-ENTMCNC: 29.6 PG — SIGNIFICANT CHANGE UP (ref 27–31)
MCHC RBC-ENTMCNC: 30.8 G/DL — LOW (ref 32–37)
MCV RBC AUTO: 96.2 FL — SIGNIFICANT CHANGE UP (ref 81–99)
MONOCYTES # BLD AUTO: 1.59 K/UL — HIGH (ref 0.1–0.6)
MONOCYTES NFR BLD AUTO: 10.4 % — HIGH (ref 1.7–9.3)
NEUTROPHILS # BLD AUTO: 11.47 K/UL — HIGH (ref 1.4–6.5)
NEUTROPHILS NFR BLD AUTO: 75.1 % — SIGNIFICANT CHANGE UP (ref 42.2–75.2)
NRBC # BLD: 0 /100 WBCS — SIGNIFICANT CHANGE UP (ref 0–0)
PCO2 BLDA: 46 MMHG — HIGH (ref 32–45)
PH BLDA: 7.39 — SIGNIFICANT CHANGE UP (ref 7.35–7.45)
PLATELET # BLD AUTO: 108 K/UL — LOW (ref 130–400)
PMV BLD: 12.5 FL — HIGH (ref 7.4–10.4)
PO2 BLDA: 92 MMHG — SIGNIFICANT CHANGE UP (ref 83–108)
POTASSIUM SERPL-MCNC: 3.5 MMOL/L — SIGNIFICANT CHANGE UP (ref 3.5–5)
POTASSIUM SERPL-SCNC: 3.5 MMOL/L — SIGNIFICANT CHANGE UP (ref 3.5–5)
PROT SERPL-MCNC: 4.1 G/DL — LOW (ref 6–8)
RBC # BLD: 2.6 M/UL — LOW (ref 4.2–5.4)
RBC # FLD: 13.3 % — SIGNIFICANT CHANGE UP (ref 11.5–14.5)
SAO2 % BLDA: 96 % — SIGNIFICANT CHANGE UP (ref 94–98)
SODIUM SERPL-SCNC: 143 MMOL/L — SIGNIFICANT CHANGE UP (ref 135–146)
SPECIMEN SOURCE: SIGNIFICANT CHANGE UP
SURGICAL PATHOLOGY STUDY: SIGNIFICANT CHANGE UP
WBC # BLD: 15.27 K/UL — HIGH (ref 4.8–10.8)
WBC # FLD AUTO: 15.27 K/UL — HIGH (ref 4.8–10.8)

## 2024-11-09 PROCEDURE — 99291 CRITICAL CARE FIRST HOUR: CPT

## 2024-11-09 PROCEDURE — 71045 X-RAY EXAM CHEST 1 VIEW: CPT | Mod: 26

## 2024-11-09 PROCEDURE — 74018 RADEX ABDOMEN 1 VIEW: CPT | Mod: 26

## 2024-11-09 RX ORDER — HYDRALAZINE HYDROCHLORIDE 10 MG/1
10 TABLET ORAL ONCE
Refills: 0 | Status: COMPLETED | OUTPATIENT
Start: 2024-11-09 | End: 2024-11-09

## 2024-11-09 RX ORDER — FENTANYL 12 UG/H
50 PATCH, EXTENDED RELEASE TRANSDERMAL ONCE
Refills: 0 | Status: DISCONTINUED | OUTPATIENT
Start: 2024-11-09 | End: 2024-11-09

## 2024-11-09 RX ORDER — CLONIDINE HYDROCHLORIDE 0.3 MG/1
0.1 TABLET ORAL ONCE
Refills: 0 | Status: COMPLETED | OUTPATIENT
Start: 2024-11-09 | End: 2024-11-09

## 2024-11-09 RX ORDER — NIFEDIPINE 10 MG
60 CAPSULE ORAL DAILY
Refills: 0 | Status: DISCONTINUED | OUTPATIENT
Start: 2024-11-09 | End: 2024-11-09

## 2024-11-09 RX ORDER — PROPOFOL 10 MG/ML
5 INJECTION, EMULSION INTRAVENOUS
Qty: 1000 | Refills: 0 | Status: DISCONTINUED | OUTPATIENT
Start: 2024-11-09 | End: 2024-11-12

## 2024-11-09 RX ORDER — LOSARTAN POTASSIUM 100 MG/1
50 TABLET, FILM COATED ORAL DAILY
Refills: 0 | Status: DISCONTINUED | OUTPATIENT
Start: 2024-11-09 | End: 2024-12-09

## 2024-11-09 RX ORDER — AMLODIPINE BESYLATE 10 MG/1
10 TABLET ORAL ONCE
Refills: 0 | Status: COMPLETED | OUTPATIENT
Start: 2024-11-09 | End: 2024-11-09

## 2024-11-09 RX ADMIN — ACETAMINOPHEN 500MG 650 MILLIGRAM(S): 500 TABLET, COATED ORAL at 06:27

## 2024-11-09 RX ADMIN — FLUCONAZOLE 100 MILLIGRAM(S): 200 TABLET ORAL at 02:00

## 2024-11-09 RX ADMIN — ENOXAPARIN SODIUM 90 MILLIGRAM(S): 30 INJECTION SUBCUTANEOUS at 09:58

## 2024-11-09 RX ADMIN — Medication 25 GRAM(S): at 07:35

## 2024-11-09 RX ADMIN — MEROPENEM 100 MILLIGRAM(S): 500 INJECTION, POWDER, FOR SOLUTION INTRAVENOUS at 21:39

## 2024-11-09 RX ADMIN — ACETAMINOPHEN 500MG 650 MILLIGRAM(S): 500 TABLET, COATED ORAL at 06:38

## 2024-11-09 RX ADMIN — LOSARTAN POTASSIUM 50 MILLIGRAM(S): 100 TABLET, FILM COATED ORAL at 16:14

## 2024-11-09 RX ADMIN — CHLORHEXIDINE GLUCONATE 1 APPLICATION(S): 1.2 RINSE ORAL at 06:38

## 2024-11-09 RX ADMIN — FENTANYL 50 MICROGRAM(S): 12 PATCH, EXTENDED RELEASE TRANSDERMAL at 08:59

## 2024-11-09 RX ADMIN — ENOXAPARIN SODIUM 90 MILLIGRAM(S): 30 INJECTION SUBCUTANEOUS at 21:39

## 2024-11-09 RX ADMIN — PANTOPRAZOLE SODIUM 40 MILLIGRAM(S): 40 TABLET, DELAYED RELEASE ORAL at 06:37

## 2024-11-09 RX ADMIN — MEROPENEM 100 MILLIGRAM(S): 500 INJECTION, POWDER, FOR SOLUTION INTRAVENOUS at 13:25

## 2024-11-09 RX ADMIN — MEROPENEM 100 MILLIGRAM(S): 500 INJECTION, POWDER, FOR SOLUTION INTRAVENOUS at 06:37

## 2024-11-09 RX ADMIN — PANTOPRAZOLE SODIUM 40 MILLIGRAM(S): 40 TABLET, DELAYED RELEASE ORAL at 17:22

## 2024-11-09 RX ADMIN — CHLORHEXIDINE GLUCONATE 15 MILLILITER(S): 1.2 RINSE ORAL at 17:22

## 2024-11-09 RX ADMIN — AMLODIPINE BESYLATE 10 MILLIGRAM(S): 10 TABLET ORAL at 11:41

## 2024-11-09 RX ADMIN — Medication 25 GRAM(S): at 09:58

## 2024-11-09 RX ADMIN — FENTANYL 50 MICROGRAM(S): 12 PATCH, EXTENDED RELEASE TRANSDERMAL at 08:44

## 2024-11-09 RX ADMIN — HYDRALAZINE HYDROCHLORIDE 10 MILLIGRAM(S): 10 TABLET ORAL at 10:15

## 2024-11-09 RX ADMIN — CLONIDINE HYDROCHLORIDE 0.1 MILLIGRAM(S): 0.3 TABLET ORAL at 13:41

## 2024-11-09 RX ADMIN — CHLORHEXIDINE GLUCONATE 15 MILLILITER(S): 1.2 RINSE ORAL at 06:37

## 2024-11-09 NOTE — PROGRESS NOTE ADULT - ASSESSMENT
IMPRESSION:    Acute hypoxemic respiratory failure  Septic shock - improved  UTI  Severe gastric distension w/ pneumatosis along posterior gastric wall  Possible aspiration   Intraperitoneal free air  Thrombocytopenia   gastric ulcer   HAGMA  NOLA non oliguric  Afib on Xarelto  HO HTN  HO ESBL Proteus    PLAN:    CNS: SAT daily. Pain control as needed. Avoid opiates     HEENT: Oral care. ET care     PULMONARY:  HOB @ 45 degrees.  Aspiration precautions. No vent changes today.  Monitor airway pressures.      CARDIOVASCULAR: TTE. Goal directed fluid resuscitation.      GI: GI prophylaxis.  NPO w/ NGT to suction. Surgery following. GI eval noted: s/p EGD. f.u CT Angio abd and pelvis w/ PO contrast today. PPi BID     RENAL:  Follow up lytes. Correct as needed.  Strict intake and output.  Burger for is and Os.    INFECTIOUS DISEASE: Follow up cultures. Meropenem and Fluconazole. ID eval noted.  MRSA nares neg.      HEMATOLOGICAL:  Resume AC. Monitor CBC and coags. HIT and DIC negative    ENDOCRINE:  Follow up FS. Insulin protocol if needed.    MUSCULOSKELETAL: Bed chair position.  Off loading    Full code    MICU  Burger        IMPRESSION:    Acute hypoxemic respiratory failure  Septic shock - improved  UTI  Severe gastric distension w/ pneumatosis along posterior gastric wall  Possible aspiration   Intraperitoneal free air  Thrombocytopenia   gastric ulcer   HAGMA  NOLA non oliguric  Afib on Xarelto  HO HTN  HO ESBL Proteus    PLAN:    CNS: SAT daily. Pain control as needed. Avoid opiates     HEENT: Oral care. ET care     PULMONARY:  HOB @ 45 degrees.  Aspiration precautions. No vent changes today.  Monitor airway pressures.      CARDIOVASCULAR: TTE. Goal directed fluid resuscitation.      GI: GI prophylaxis.  NPO w/ NGT to suction. Surgery following. GI eval noted: s/p EGD. f.u CT Angio abd and pelvis w/ PO contrast today. PPI BID     RENAL:  Follow up lytes. Correct as needed.  Strict intake and output.  Burger for is and Os.    INFECTIOUS DISEASE: Follow up cultures. Meropenem and Fluconazole. ID eval noted.  MRSA nares neg. f/u ID reccs     HEMATOLOGICAL:  Resume AC. Monitor CBC and coags. HIT and DIC negative    ENDOCRINE:  Follow up FS. Insulin protocol if needed.    MUSCULOSKELETAL: Bed chair position.  Off loading    Full code    MICU  Burger

## 2024-11-09 NOTE — PROGRESS NOTE ADULT - ATTENDING COMMENTS
Attending Statement: I have personally performed a face to face diagnostic evaluation on this patient. The patient is suffering from:  Acute hypoxemic respiratory failure  Septic shock - improved  UTI  Severe gastric distension w/ pneumatosis along posterior gastric wall  Possible aspiration   Intraperitoneal free air  Thrombocytopenia   gastric ulcer   HAGMA  NOLA non oliguric  Afib on Xarelto  I have made amendments to the documentation where necessary. I have personally seen and examined this patient.  I have fully participated in the care of this patient.  I have reviewed all pertinent clinical information, including history, physical exam, plan and note.  The time spent to provide this service today was at or over 35 minutes.

## 2024-11-09 NOTE — PROGRESS NOTE ADULT - ASSESSMENT
ASSESSMENT  73-year-old female PMH A-fib on xarelto,, HTN, s/p cholecystectomy, congenital solitary kidney presents from NH to the ED for evaluation of weakness, decreased PO intake and abdominal distension. Pt's son Everton says pt has not been feeling well the past few days, and has had poor appetite which is unlike her. She's been more tired and weak, unable to get out of bed. Has not had a bowel movement in 3 days, pt unsure if she's been passing gas otherwise. She was noted to be hypotensive and have an elevated wbc count at the NH as well. No dysuria, chest pain, SOB, cough or fever per pt otherwise.  No other complaints currently.     In the ED, BP 66/45 S/P LR 2800cc bolus with no improvement in BP, started on peripheral levo. Labs with wbc 19.29, vbg lactate 2.0-->2.3, pH 7.2, pCO2 45, Na 133, AG 21, Cr 4.3 (baseline 1.1), UA contaminated. CTAP with severe gastric distension, multiple foci of intraperitoneal free air and urinary bladder thickening with free air. Surgery consulted, no intervention for now, NGT placed for gastric decompression with 3.1L feculent output,     IMPRESSIONS  #Fever  # intubated on mechanical ventilation   #Septic shock requiring pressors   Rule out acute cystitis/pyelo as UA pyuria > 900; UCX   >100,000 CFU/ml Streptococcus gallolyticus "Susceptibilities not performed"  Rule out mesenteric ischemia- Severe gastric distension w/ pneumatosis along posterior gastric wall. Free air     Admission WBC 19--> 21    11/3 BCX NGTD x2  < from: CT Abdomen and Pelvis No Cont (11.03.24 @ 11:37) >  1. Severe gastric distention extending to the level of the first/second   portion of duodenum, with new pneumatosis along the posterior gastric   wall, suspicious for ischemia; multiple adjacent foci of free   intraperitoneal air are noted.  2. New small linear foci of air within the peripheral liver, suspicious for portal venous gas.  3. Thick-walled urinary bladder containing multiple foci of air; findings may be related to recent intervention and/or underlying cystitis, however   however please note colovesicular fistula can have this appearance. Clinical correlation is suggested.  4. Small bilateral pleural effusions with small bibasilar opacities/atelectasis.    Stool cx NG    1/2023 UCX ESBL proteus     Cdiff (-)   #Lactic acidosis  #NOLA   Creatinine: 0.9 mg/dL (11.08.24 @ 04:07) crcl 65    RECOMMENDATIONS  - Send BCX as fever  - f/u sputum cx , overl CXR decreased opacities LLL  - Trend fever curve, WBC  - Meropenem  IVPB 1000 milliGRAM(s) IV Intermittent every 8 hours, continue given critical illness 11/3-  - Can hold further fluconazole , no concern for small bowel process  - f/u CT   - Strep gallolyticus can be associated with underlying GI malignancy , workup per primary team   - GI / Surgery   - Poor prognosis, GOC    If any questions, please text or call on [employer] Teams  Please continue to update ID with any pertinent new clinical, laboratory or radiographic findings

## 2024-11-09 NOTE — PROGRESS NOTE ADULT - SUBJECTIVE AND OBJECTIVE BOX
PATRICIA SPRAGUE  73y, Female  Allergy: No Known Allergies      LOS  6d    CHIEF COMPLAINT: sepsis (09 Nov 2024 06:31)      INTERVAL EVENTS/HPI  - T(F): , Max: 100.7 (11-08-24 @ 12:00) fever  - WBC Count: 15.27 (11-09-24 @ 04:21)  WBC Count: 14.61 (11-08-24 @ 04:07)     - Creatinine: 0.8 (11-09-24 @ 04:21)  Creatinine: 0.9 (11-08-24 @ 11:14)     -   -   -     ROS  cannot obtain secondary to patient's sedation and/or mental status    VITALS:  T(F): 99.5, Max: 100.7 (11-08-24 @ 12:00)  HR: 72  BP: 172/72  RR: 22Vital Signs Last 24 Hrs  T(C): 37.5 (09 Nov 2024 08:00), Max: 38.2 (08 Nov 2024 12:00)  T(F): 99.5 (09 Nov 2024 08:00), Max: 100.7 (08 Nov 2024 12:00)  HR: 72 (09 Nov 2024 11:00) (62 - 106)  BP: 172/72 (09 Nov 2024 11:00) (81/49 - 188/77)  BP(mean): 103 (09 Nov 2024 11:00) (61 - 110)  RR: 22 (09 Nov 2024 11:00) (14 - 41)  SpO2: 98% (09 Nov 2024 11:00) (91% - 99%)    Parameters below as of 09 Nov 2024 10:00  Patient On (Oxygen Delivery Method): ventilator        PHYSICAL EXAM:  General: intubated obese  HEENT: NCAT  Neck: supple  CV: RRR  Lungs: symmetric chest expansion, decreased BS at bases  Abd: Soft  Extr: wwp  Skin: no rash  Neuro: sedated  Lines: no phlebitis     FH: Non-contributory  Social Hx: Non-contributory    TESTS & MEASUREMENTS:                        7.7    15.27 )-----------( 108      ( 09 Nov 2024 04:21 )             25.0     11-09    143  |  109  |  23[H]  ----------------------------<  76  3.5   |  25  |  0.8    Ca    10.4      09 Nov 2024 04:21  Mg     1.5     11-09    TPro  4.1[L]  /  Alb  2.4[L]  /  TBili  0.6  /  DBili  x   /  AST  19  /  ALT  20  /  AlkPhos  176[H]  11-09      LIVER FUNCTIONS - ( 09 Nov 2024 04:21 )  Alb: 2.4 g/dL / Pro: 4.1 g/dL / ALK PHOS: 176 U/L / ALT: 20 U/L / AST: 19 U/L / GGT: x           Urinalysis Basic - ( 09 Nov 2024 04:21 )    Color: x / Appearance: x / SG: x / pH: x  Gluc: 76 mg/dL / Ketone: x  / Bili: x / Urobili: x   Blood: x / Protein: x / Nitrite: x   Leuk Esterase: x / RBC: x / WBC x   Sq Epi: x / Non Sq Epi: x / Bacteria: x        Culture - Sputum (collected 11-08-24 @ 12:36)  Source: Trach Asp Tracheal Aspirate  Gram Stain (11-09-24 @ 07:34):    Few polymorphonuclear leukocytes per low power field    No Squamous epithelial cells per low power field    Rare Gram positive cocci in pairs seen per oil power field    Rare Gram Negative Rods seen per oil power field    Urinalysis with Rflx Culture (collected 11-03-24 @ 13:51)    Culture - Stool (collected 11-03-24 @ 13:51)  Source: .Stool  Final Report (11-05-24 @ 19:02):    No enteric pathogens isolated.    (Stool culture examined for Salmonella,    Shigella, Campylobacter, Aeromonas, Plesiomonas,    Vibrio, E.coli O157 and Yersinia)    Culture - Urine (collected 11-03-24 @ 13:51)  Source: Clean Catch None  Final Report (11-05-24 @ 12:11):    >100,000 CFU/ml Streptococcus gallolyticus "Susceptibilities not    performed"    <10,000 CFU/ml Normal Urogenital rosalva present    Culture - Blood (collected 11-03-24 @ 11:10)  Source: .Blood BLOOD  Final Report (11-08-24 @ 18:00):    No growth at 5 days    Culture - Blood (collected 11-03-24 @ 11:10)  Source: .Blood BLOOD  Final Report (11-08-24 @ 18:00):    No growth at 5 days        Lactate, Blood: 1.5 mmol/L (11-04-24 @ 16:58)      INFECTIOUS DISEASES TESTING  Procalcitonin: 5.22 (11-05-24 @ 19:32)  MRSA PCR Result.: Negative (11-04-24 @ 13:15)      INFLAMMATORY MARKERS      RADIOLOGY & ADDITIONAL TESTS:  I have personally reviewed the last available Chest xray  CXR  Xray Chest 1 View- PORTABLE-Urgent:   ACC: [id number] EXAM:  XR CHEST PORTABLE URGENT 1V   ORDERED BY: ROBERT SHANE     PROCEDURE DATE:  11/07/2024          INTERPRETATION:  CLINICAL HISTORY: Assess ET tube.    COMPARISON: Chest radiograph 11/7/2024 at 5:53 AM.    TECHNIQUE: Frontal chest radiograph.    FINDINGS:    Support devices: ET tube terminates 4.2 cm above the tor. Enteric tube   courses below the left hemidiaphragm.    Cardiac/mediastinum/hilum: Unchanged.    Lung parenchyma/Pleura: Essentially stable bibasilar opacities/effusion.   No pneumothorax.    Skeleton/soft tissues: Unchanged.      IMPRESSION:    Support devices as described.    Essentially stable bibasilar opacities/effusion.    --- End of Report ---          FLOWER STEVENS MD; Resident Radiologist  This document has been electronically signed.  PARK TA MD; Attending Radiologist  This document has been electronically signed. Nov 7 2024 10:57AM (11-07-24 @ 10:03)      CT      CARDIOLOGY TESTING  12 Lead ECG:   Ventricular Rate 87 BPM    Atrial Rate 85 BPM    QRS Duration 110 ms    Q-T Interval 356 ms    QTC Calculation(Bazett) 428 ms    R Axis 67 degrees    T Axis -10 degrees    Diagnosis Line Atrial fibrillation  Low voltage QRS  Abnormal QRS-T angle, consider primary T wave abnormality  Abnormal ECG    Confirmed by Sanjeev Pierce ([id number]) on 11/7/2024 9:09:37 AM (11-06-24 @ 12:32)      MEDICATIONS  chlorhexidine 0.12% Liquid 15  chlorhexidine 2% Cloths 1  enoxaparin Injectable 90  meropenem  IVPB 1000  norepinephrine Infusion 0.05  pantoprazole  Injectable 40  propofol Infusion 5      WEIGHT  Weight (kg): 91.6 (11-05-24 @ 01:00)  Creatinine: 0.8 mg/dL (11-09-24 @ 04:21)      ANTIBIOTICS:  meropenem  IVPB 1000 milliGRAM(s) IV Intermittent every 8 hours      All available historical records have been reviewed

## 2024-11-09 NOTE — PROGRESS NOTE ADULT - ASSESSMENT
ASSESSMENT:  73-year-old female PMH A-fib on xarelto,  HTN, s/p cholecystectomy, congenital solitary kidney presents from NH to the ED for evaluation of weakness, decreased PO intake and abdominal distension. Found to have severe gastric distension with emphysematous changes, NG tube placed in ED with 3.1L initial output. Admitted to medical services for workup, actively resuscitated and decompressed via NG tube. Surgery consulted for gastric distension and air within gastric wall    PLAN:  - No acute surgical intervention   - c/w NGT in place to suction. Monitor output   - Follow up read on CT scan   - Rest of care per primary team        ASSESSMENT:  73-year-old female PMH A-fib on xarelto,  HTN, s/p cholecystectomy, congenital solitary kidney presents from NH to the ED for evaluation of weakness, decreased PO intake and abdominal distension. Found to have severe gastric distension with emphysematous changes, NG tube placed in ED with 3.1L initial output. Admitted to medical services for workup, actively resuscitated and decompressed via NG tube. Surgery consulted for gastric distension and air within gastric wall    PLAN:  - No acute surgical intervention   - Remove NGT  - Start trickle feed through feeding tube    - Follow up read on CT scan   - Rest of care per primary team

## 2024-11-09 NOTE — PROGRESS NOTE ADULT - CRITICAL CARE ATTENDING COMMENT
The patient's injury acutely impairs one or more vital organ systems, and there is a high probability of imminent or life threatening deterioration in the patient’s condition. The care of this patient requires complex medical decision making in the field of Infectious Diseases and extensive interpretation of laboratory, microbiological, and radiographic data

## 2024-11-09 NOTE — PROGRESS NOTE ADULT - SUBJECTIVE AND OBJECTIVE BOX
GENERAL SURGERY PROGRESS NOTE     Patient: PATRICIA SPRAGUE , 73y ([birth year])Female   MRN: [id number]  Location: [address]  Visit: 11-03-24 Inpatient  Date: 11-09-24 @ 01:22        Admitted :11-03-24 (6d)  LOS: 6d    Procedure/Dx/Injuries: Sepsis    Sepsis due to urinary tract infection         Events of past 24 hours: Patient seen and examined at bedside. Patient is intubated on vent settings 40/10. She is afebrile and HDS. CT with PO contast has been completed. NGT with 50 cc of output  >>> <<<>>> <<<>>> <<<>>> <<<>>> <<<>>> <<<>>> <<<>>> <<<>>> <<<>>> <<<    Vitals:   T(F): 100.2 (11-08-24 @ 23:00), Max: 100.7 (11-08-24 @ 12:00)  HR: 85 (11-09-24 @ 01:00)  BP: 167/67 (11-09-24 @ 01:00)  RR: 23 (11-09-24 @ 01:00)  SpO2: 95% (11-09-24 @ 01:00)  Mode: AC/ CMV (Assist Control/ Continuous Mandatory Ventilation), RR (machine): 14, TV (machine): 380, FiO2: 40, PEEP: 10, ITime: 0.9, MAP: 13, PIP: 24    PHYSICAL EXAM:  General Appearance: Intubated  HEENT: Normocephalic, atraumatic  Heart: s1, s2,    Lungs: No increased work of breathing or accessory muscle use. Symmetric chest wall rise and fall. On vent 40/10  Abdomen:  Soft, nontender, nondistended. No rebound or guarding.  MSK/Extremities: Warm & well-perfused.   Skin: Warm, dry. No jaundice.   >>> <<<>>> <<<>>> <<<>>> <<<>>> <<<>>> <<<>>> <<<>>> <<<>>> <<<>>> <<<   Is & Os:   Diet, NPO:   Except Medications    Fluids:     11-07-24 @ 07:01  -  11-08-24 @ 07:00  --------------------------------------------------------  IN:    Dexmedetomidine: 39 mL    dextrose 5%: 4950 mL    dextrose 5% + sodium chloride 0.9%: 160 mL    FentaNYL: 555 mL    Free Water: 400 mL    IV PiggyBack: 150 mL  Total IN: 6254 mL    OUT:    Indwelling Catheter - Urethral (mL): 1885 mL    Nasogastric/Oral tube (mL): 250 mL  Total OUT: 2135 mL    Total NET: 4119 mL          >>> <<<>>> <<<>>> <<<>>> <<<>>> <<<>>> <<<>>> <<<>>> <<<>>> <<<>>> <<<    MEDICATIONS  (STANDING):  chlorhexidine 0.12% Liquid 15 milliLiter(s) Oral Mucosa every 12 hours  chlorhexidine 2% Cloths 1 Application(s) Topical <User Schedule>  enoxaparin Injectable 90 milliGRAM(s) SubCutaneous every 12 hours  fluconAZOLE IVPB 200 milliGRAM(s) IV Intermittent every 24 hours  meropenem  IVPB 1000 milliGRAM(s) IV Intermittent every 8 hours  norepinephrine Infusion 0.05 MICROgram(s)/kG/Min (8.59 mL/Hr) IV Continuous <Continuous>  pantoprazole  Injectable 40 milliGRAM(s) IV Push two times a day  propofol Infusion 5 MICROgram(s)/kG/Min (2.75 mL/Hr) IV Continuous <Continuous>    MEDICATIONS  (PRN):  acetaminophen     Tablet .. 650 milliGRAM(s) Oral every 6 hours PRN Temp greater or equal to 38C (100.4F)  fentaNYL    Injectable 50 MICROGram(s) IV Push daily PRN sedation      DVT PROPHYLAXIS: enoxaparin Injectable 90 milliGRAM(s) SubCutaneous every 12 hours    GI PROPHYLAXIS: pantoprazole  Injectable 40 milliGRAM(s) IV Push two times a day    ANTICOAGULATION:   ANTIBIOTICS:  fluconAZOLE IVPB 200 milliGRAM(s)  meropenem  IVPB 1000 milliGRAM(s)      >>> <<<>>> <<<>>> <<<>>> <<<>>> <<<>>> <<<>>> <<<>>> <<<>>> <<<>>> <<<        LAB/STUDIES:  Labs:  CAPILLARY BLOOD GLUCOSE      POCT Blood Glucose.: 71 mg/dL (09 Nov 2024 00:01)  POCT Blood Glucose.: 90 mg/dL (08 Nov 2024 18:25)  POCT Blood Glucose.: 89 mg/dL (08 Nov 2024 11:26)                          8.4    14.61 )-----------( 144      ( 08 Nov 2024 04:07 )             27.5       Auto Neutrophil %: 71.7 % (11-08-24 @ 04:07)  Auto Immature Granulocyte %: 1.4 % (11-08-24 @ 04:07)    11-08    137  |  105  |  30[H]  ----------------------------<  81  4.3   |  25  |  0.9      Calcium: 10.5 mg/dL (11-08-24 @ 11:14)      LFTs:             4.2  | 1.0  | 48       ------------------[169     ( 08 Nov 2024 04:07 )  2.4  | x    | 23          Lipase:x      Amylase:x         Blood Gas Arterial, Lactate: 1.6 mmol/L (11-08-24 @ 03:22)  Blood Gas Arterial, Lactate: 0.8 mmol/L (11-07-24 @ 03:05)  Blood Gas Arterial, Lactate: 1.0 mmol/L (11-06-24 @ 12:38)    ABG - ( 08 Nov 2024 03:22 )  pH: 7.37  /  pCO2: 46    /  pO2: 77    / HCO3: 27    / Base Excess: 0.8   /  SaO2: 95.1            ABG - ( 07 Nov 2024 03:05 )  pH: 7.44  /  pCO2: 40    /  pO2: 146   / HCO3: 27    / Base Excess: 2.8   /  SaO2: 96.2            ABG - ( 06 Nov 2024 12:38 )  pH: 7.44  /  pCO2: 38    /  pO2: 195   / HCO3: 26    / Base Excess: 1.6   /  SaO2: 96.3              Coags:     11.80  ----< 1.00    ( 07 Nov 2024 11:35 )     28.8                Urinalysis Basic - ( 08 Nov 2024 11:14 )    Color: x / Appearance: x / SG: x / pH: x  Gluc: 81 mg/dL / Ketone: x  / Bili: x / Urobili: x   Blood: x / Protein: x / Nitrite: x   Leuk Esterase: x / RBC: x / WBC x   Sq Epi: x / Non Sq Epi: x / Bacteria: x

## 2024-11-09 NOTE — PROGRESS NOTE ADULT - SUBJECTIVE AND OBJECTIVE BOX
Patient is a 73y old  Female who presents with a chief complaint of abd distension (08 Nov 2024 08:48)        Over Night Events:        ROS:     All ROS are negative except HPI         PHYSICAL EXAM    ICU Vital Signs Last 24 Hrs  T(C): 37.9 (09 Nov 2024 04:00), Max: 38.2 (08 Nov 2024 12:00)  T(F): 100.2 (09 Nov 2024 04:00), Max: 100.7 (08 Nov 2024 12:00)  HR: 75 (09 Nov 2024 05:30) (73 - 125)  BP: 149/75 (09 Nov 2024 05:30) (99/55 - 178/69)  BP(mean): 95 (09 Nov 2024 05:30) (65 - 105)  ABP: --  ABP(mean): --  RR: 18 (09 Nov 2024 05:30) (13 - 41)  SpO2: 96% (09 Nov 2024 05:30) (92% - 99%)    O2 Parameters below as of 08 Nov 2024 16:00  Patient On (Oxygen Delivery Method): ventilator      CONSTITUTIONAL:  Elderly    ENT:   ETT    EYES:   Pupils equal,   Round and reactive to light.    CARDIAC:   Normal rate,   Regular rhythm.    No edema    RESPIRATORY:   Bilateral mechanical BS  Normal chest expansion  Not tachypneic,  No use of accessory muscles    GASTROINTESTINAL:  Abdomen soft,   Non-tender,   No guarding,   + BS    MUSCULOSKELETAL:   Range of motion is not limited,  No clubbing, cyanosis    NEUROLOGICAL:   Awake    SKIN:   Skin normal color for race,   Warm and dry and intact.   No evidence of rash.            11-07-24 @ 07:01  -  11-08-24 @ 07:00  --------------------------------------------------------  IN:    Dexmedetomidine: 39 mL    dextrose 5%: 4950 mL    dextrose 5% + sodium chloride 0.9%: 160 mL    FentaNYL: 555 mL    Free Water: 400 mL    IV PiggyBack: 150 mL  Total IN: 6254 mL    OUT:    Indwelling Catheter - Urethral (mL): 1885 mL    Nasogastric/Oral tube (mL): 250 mL  Total OUT: 2135 mL    Total NET: 4119 mL      11-08-24 @ 07:01  -  11-09-24 @ 06:32  --------------------------------------------------------  IN:    dextrose 5%: 225 mL    FentaNYL: 151.2 mL    IV PiggyBack: 150 mL    Propofol: 71.5 mL  Total IN: 597.7 mL    OUT:    Indwelling Catheter - Urethral (mL): 1060 mL    Nasogastric/Oral tube (mL): 50 mL  Total OUT: 1110 mL    Total NET: -512.3 mL          LABS:                            7.7    15.27 )-----------( 108      ( 09 Nov 2024 04:21 )             25.0                                               11-09    143  |  109  |  23[H]  ----------------------------<  76  3.5   |  25  |  0.8    Ca    10.4      09 Nov 2024 04:21  Mg     1.5     11-09    TPro  4.1[L]  /  Alb  2.4[L]  /  TBili  0.6  /  DBili  x   /  AST  19  /  ALT  20  /  AlkPhos  176[H]  11-09      PT/INR - ( 07 Nov 2024 11:35 )   PT: 11.80 sec;   INR: 1.00 ratio         PTT - ( 07 Nov 2024 11:35 )  PTT:28.8 sec                                       Urinalysis Basic - ( 09 Nov 2024 04:21 )    Color: x / Appearance: x / SG: x / pH: x  Gluc: 76 mg/dL / Ketone: x  / Bili: x / Urobili: x   Blood: x / Protein: x / Nitrite: x   Leuk Esterase: x / RBC: x / WBC x   Sq Epi: x / Non Sq Epi: x / Bacteria: x                                                  LIVER FUNCTIONS - ( 09 Nov 2024 04:21 )  Alb: 2.4 g/dL / Pro: 4.1 g/dL / ALK PHOS: 176 U/L / ALT: 20 U/L / AST: 19 U/L / GGT: x                                                                                               Mode: AC/ CMV (Assist Control/ Continuous Mandatory Ventilation)  RR (machine): 14  TV (machine): 380  FiO2: 40  PEEP: 10  ITime: 1  MAP: 15  PIP: 34                                      ABG - ( 09 Nov 2024 03:04 )  pH, Arterial: 7.39  pH, Blood: x     /  pCO2: 46    /  pO2: 92    / HCO3: 28    / Base Excess: 2.2   /  SaO2: 96.0                MEDICATIONS  (STANDING):  chlorhexidine 0.12% Liquid 15 milliLiter(s) Oral Mucosa every 12 hours  chlorhexidine 2% Cloths 1 Application(s) Topical <User Schedule>  enoxaparin Injectable 90 milliGRAM(s) SubCutaneous every 12 hours  fluconAZOLE IVPB 200 milliGRAM(s) IV Intermittent every 24 hours  meropenem  IVPB 1000 milliGRAM(s) IV Intermittent every 8 hours  norepinephrine Infusion 0.05 MICROgram(s)/kG/Min (8.59 mL/Hr) IV Continuous <Continuous>  pantoprazole  Injectable 40 milliGRAM(s) IV Push two times a day  propofol Infusion 5 MICROgram(s)/kG/Min (2.75 mL/Hr) IV Continuous <Continuous>    MEDICATIONS  (PRN):  acetaminophen     Tablet .. 650 milliGRAM(s) Oral every 6 hours PRN Temp greater or equal to 38C (100.4F)  fentaNYL    Injectable 50 MICROGram(s) IV Push daily PRN sedation         Patient is a 73y old  Female who presents with a chief complaint of abd distension (08 Nov 2024 08:48)        Over Night Events: Bm noted, abd less distended         ROS:     All ROS are negative except HPI         PHYSICAL EXAM    ICU Vital Signs Last 24 Hrs  T(C): 37.9 (09 Nov 2024 04:00), Max: 38.2 (08 Nov 2024 12:00)  T(F): 100.2 (09 Nov 2024 04:00), Max: 100.7 (08 Nov 2024 12:00)  HR: 75 (09 Nov 2024 05:30) (73 - 125)  BP: 149/75 (09 Nov 2024 05:30) (99/55 - 178/69)  BP(mean): 95 (09 Nov 2024 05:30) (65 - 105)  ABP: --  ABP(mean): --  RR: 18 (09 Nov 2024 05:30) (13 - 41)  SpO2: 96% (09 Nov 2024 05:30) (92% - 99%)    O2 Parameters below as of 08 Nov 2024 16:00  Patient On (Oxygen Delivery Method): ventilator      CONSTITUTIONAL:  Elderly    ENT:   ETT    EYES:   Pupils equal,   Round and reactive to light.    CARDIAC:   Normal rate,   Regular rhythm.    No edema    RESPIRATORY:   Bilateral mechanical BS  Normal chest expansion  Not tachypneic,  No use of accessory muscles    GASTROINTESTINAL:  Abdomen soft,   Non-tender,   No guarding,   + BS    MUSCULOSKELETAL:   Range of motion is not limited,  No clubbing, cyanosis    NEUROLOGICAL:   Awake    SKIN:   Skin normal color for race,   Warm and dry and intact.   No evidence of rash.            11-07-24 @ 07:01  -  11-08-24 @ 07:00  --------------------------------------------------------  IN:    Dexmedetomidine: 39 mL    dextrose 5%: 4950 mL    dextrose 5% + sodium chloride 0.9%: 160 mL    FentaNYL: 555 mL    Free Water: 400 mL    IV PiggyBack: 150 mL  Total IN: 6254 mL    OUT:    Indwelling Catheter - Urethral (mL): 1885 mL    Nasogastric/Oral tube (mL): 250 mL  Total OUT: 2135 mL    Total NET: 4119 mL      11-08-24 @ 07:01  -  11-09-24 @ 06:32  --------------------------------------------------------  IN:    dextrose 5%: 225 mL    FentaNYL: 151.2 mL    IV PiggyBack: 150 mL    Propofol: 71.5 mL  Total IN: 597.7 mL    OUT:    Indwelling Catheter - Urethral (mL): 1060 mL    Nasogastric/Oral tube (mL): 50 mL  Total OUT: 1110 mL    Total NET: -512.3 mL          LABS:                            7.7    15.27 )-----------( 108      ( 09 Nov 2024 04:21 )             25.0                                               11-09    143  |  109  |  23[H]  ----------------------------<  76  3.5   |  25  |  0.8    Ca    10.4      09 Nov 2024 04:21  Mg     1.5     11-09    TPro  4.1[L]  /  Alb  2.4[L]  /  TBili  0.6  /  DBili  x   /  AST  19  /  ALT  20  /  AlkPhos  176[H]  11-09      PT/INR - ( 07 Nov 2024 11:35 )   PT: 11.80 sec;   INR: 1.00 ratio         PTT - ( 07 Nov 2024 11:35 )  PTT:28.8 sec                                       Urinalysis Basic - ( 09 Nov 2024 04:21 )    Color: x / Appearance: x / SG: x / pH: x  Gluc: 76 mg/dL / Ketone: x  / Bili: x / Urobili: x   Blood: x / Protein: x / Nitrite: x   Leuk Esterase: x / RBC: x / WBC x   Sq Epi: x / Non Sq Epi: x / Bacteria: x                                                  LIVER FUNCTIONS - ( 09 Nov 2024 04:21 )  Alb: 2.4 g/dL / Pro: 4.1 g/dL / ALK PHOS: 176 U/L / ALT: 20 U/L / AST: 19 U/L / GGT: x                                                                                               Mode: AC/ CMV (Assist Control/ Continuous Mandatory Ventilation)  RR (machine): 14  TV (machine): 380  FiO2: 40  PEEP: 10  ITime: 1  MAP: 15  PIP: 34                                      ABG - ( 09 Nov 2024 03:04 )  pH, Arterial: 7.39  pH, Blood: x     /  pCO2: 46    /  pO2: 92    / HCO3: 28    / Base Excess: 2.2   /  SaO2: 96.0                MEDICATIONS  (STANDING):  chlorhexidine 0.12% Liquid 15 milliLiter(s) Oral Mucosa every 12 hours  chlorhexidine 2% Cloths 1 Application(s) Topical <User Schedule>  enoxaparin Injectable 90 milliGRAM(s) SubCutaneous every 12 hours  fluconAZOLE IVPB 200 milliGRAM(s) IV Intermittent every 24 hours  meropenem  IVPB 1000 milliGRAM(s) IV Intermittent every 8 hours  norepinephrine Infusion 0.05 MICROgram(s)/kG/Min (8.59 mL/Hr) IV Continuous <Continuous>  pantoprazole  Injectable 40 milliGRAM(s) IV Push two times a day  propofol Infusion 5 MICROgram(s)/kG/Min (2.75 mL/Hr) IV Continuous <Continuous>    MEDICATIONS  (PRN):  acetaminophen     Tablet .. 650 milliGRAM(s) Oral every 6 hours PRN Temp greater or equal to 38C (100.4F)  fentaNYL    Injectable 50 MICROGram(s) IV Push daily PRN sedation

## 2024-11-10 LAB
ALBUMIN SERPL ELPH-MCNC: 2.4 G/DL — LOW (ref 3.5–5.2)
ALP SERPL-CCNC: 154 U/L — HIGH (ref 30–115)
ALT FLD-CCNC: 13 U/L — SIGNIFICANT CHANGE UP (ref 0–41)
ANION GAP SERPL CALC-SCNC: 13 MMOL/L — SIGNIFICANT CHANGE UP (ref 7–14)
AST SERPL-CCNC: 12 U/L — SIGNIFICANT CHANGE UP (ref 0–41)
BASE EXCESS BLDA CALC-SCNC: 2.5 MMOL/L — SIGNIFICANT CHANGE UP (ref -2–3)
BASOPHILS # BLD AUTO: 0.04 K/UL — SIGNIFICANT CHANGE UP (ref 0–0.2)
BASOPHILS NFR BLD AUTO: 0.3 % — SIGNIFICANT CHANGE UP (ref 0–1)
BILIRUB SERPL-MCNC: 0.4 MG/DL — SIGNIFICANT CHANGE UP (ref 0.2–1.2)
BUN SERPL-MCNC: 18 MG/DL — SIGNIFICANT CHANGE UP (ref 10–20)
CALCIUM SERPL-MCNC: 10 MG/DL — SIGNIFICANT CHANGE UP (ref 8.4–10.5)
CHLORIDE SERPL-SCNC: 109 MMOL/L — SIGNIFICANT CHANGE UP (ref 98–110)
CO2 SERPL-SCNC: 23 MMOL/L — SIGNIFICANT CHANGE UP (ref 17–32)
CREAT SERPL-MCNC: 0.8 MG/DL — SIGNIFICANT CHANGE UP (ref 0.7–1.5)
CULTURE RESULTS: NO GROWTH
EGFR: 79 ML/MIN/1.73M2 — SIGNIFICANT CHANGE UP
EOSINOPHIL # BLD AUTO: 0.44 K/UL — SIGNIFICANT CHANGE UP (ref 0–0.7)
EOSINOPHIL NFR BLD AUTO: 3.3 % — SIGNIFICANT CHANGE UP (ref 0–8)
GAS PNL BLDA: SIGNIFICANT CHANGE UP
GLUCOSE SERPL-MCNC: 84 MG/DL — SIGNIFICANT CHANGE UP (ref 70–99)
HCO3 BLDA-SCNC: 26 MMOL/L — SIGNIFICANT CHANGE UP (ref 21–28)
HCT VFR BLD CALC: 26.7 % — LOW (ref 37–47)
HGB BLD-MCNC: 8.4 G/DL — LOW (ref 12–16)
HOROWITZ INDEX BLDA+IHG-RTO: 40 — SIGNIFICANT CHANGE UP
IMM GRANULOCYTES NFR BLD AUTO: 2 % — HIGH (ref 0.1–0.3)
LYMPHOCYTES # BLD AUTO: 1.47 K/UL — SIGNIFICANT CHANGE UP (ref 1.2–3.4)
LYMPHOCYTES # BLD AUTO: 11.1 % — LOW (ref 20.5–51.1)
MAGNESIUM SERPL-MCNC: 1.9 MG/DL — SIGNIFICANT CHANGE UP (ref 1.8–2.4)
MCHC RBC-ENTMCNC: 29.5 PG — SIGNIFICANT CHANGE UP (ref 27–31)
MCHC RBC-ENTMCNC: 31.5 G/DL — LOW (ref 32–37)
MCV RBC AUTO: 93.7 FL — SIGNIFICANT CHANGE UP (ref 81–99)
MONOCYTES # BLD AUTO: 1.02 K/UL — HIGH (ref 0.1–0.6)
MONOCYTES NFR BLD AUTO: 7.7 % — SIGNIFICANT CHANGE UP (ref 1.7–9.3)
NEUTROPHILS # BLD AUTO: 9.97 K/UL — HIGH (ref 1.4–6.5)
NEUTROPHILS NFR BLD AUTO: 75.6 % — HIGH (ref 42.2–75.2)
NRBC # BLD: 0 /100 WBCS — SIGNIFICANT CHANGE UP (ref 0–0)
PCO2 BLDA: 37 MMHG — SIGNIFICANT CHANGE UP (ref 32–45)
PH BLDA: 7.46 — HIGH (ref 7.35–7.45)
PLATELET # BLD AUTO: 100 K/UL — LOW (ref 130–400)
PMV BLD: 12.3 FL — HIGH (ref 7.4–10.4)
PO2 BLDA: 137 MMHG — HIGH (ref 83–108)
POTASSIUM SERPL-MCNC: 3.4 MMOL/L — LOW (ref 3.5–5)
POTASSIUM SERPL-SCNC: 3.4 MMOL/L — LOW (ref 3.5–5)
PROT SERPL-MCNC: 4.3 G/DL — LOW (ref 6–8)
RBC # BLD: 2.85 M/UL — LOW (ref 4.2–5.4)
RBC # FLD: 13.2 % — SIGNIFICANT CHANGE UP (ref 11.5–14.5)
SAO2 % BLDA: 96 % — SIGNIFICANT CHANGE UP (ref 94–98)
SODIUM SERPL-SCNC: 145 MMOL/L — SIGNIFICANT CHANGE UP (ref 135–146)
SPECIMEN SOURCE: SIGNIFICANT CHANGE UP
WBC # BLD: 13.2 K/UL — HIGH (ref 4.8–10.8)
WBC # FLD AUTO: 13.2 K/UL — HIGH (ref 4.8–10.8)

## 2024-11-10 PROCEDURE — 74018 RADEX ABDOMEN 1 VIEW: CPT | Mod: 26

## 2024-11-10 PROCEDURE — 71045 X-RAY EXAM CHEST 1 VIEW: CPT | Mod: 26

## 2024-11-10 PROCEDURE — 99291 CRITICAL CARE FIRST HOUR: CPT

## 2024-11-10 RX ORDER — FENTANYL 12 UG/H
0.5 PATCH, EXTENDED RELEASE TRANSDERMAL
Qty: 2500 | Refills: 0 | Status: DISCONTINUED | OUTPATIENT
Start: 2024-11-10 | End: 2024-11-10

## 2024-11-10 RX ORDER — FUROSEMIDE 40 MG/1
40 TABLET ORAL ONCE
Refills: 0 | Status: COMPLETED | OUTPATIENT
Start: 2024-11-10 | End: 2024-11-10

## 2024-11-10 RX ORDER — AMLODIPINE BESYLATE 10 MG/1
10 TABLET ORAL DAILY
Refills: 0 | Status: DISCONTINUED | OUTPATIENT
Start: 2024-11-10 | End: 2024-12-07

## 2024-11-10 RX ORDER — FENTANYL 12 UG/H
0.5 PATCH, EXTENDED RELEASE TRANSDERMAL
Qty: 2500 | Refills: 0 | Status: DISCONTINUED | OUTPATIENT
Start: 2024-11-10 | End: 2024-11-12

## 2024-11-10 RX ORDER — POTASSIUM CHLORIDE 600 MG/1
20 TABLET, EXTENDED RELEASE ORAL ONCE
Refills: 0 | Status: COMPLETED | OUTPATIENT
Start: 2024-11-10 | End: 2024-11-10

## 2024-11-10 RX ADMIN — MEROPENEM 100 MILLIGRAM(S): 500 INJECTION, POWDER, FOR SOLUTION INTRAVENOUS at 21:05

## 2024-11-10 RX ADMIN — CHLORHEXIDINE GLUCONATE 15 MILLILITER(S): 1.2 RINSE ORAL at 05:02

## 2024-11-10 RX ADMIN — MEROPENEM 100 MILLIGRAM(S): 500 INJECTION, POWDER, FOR SOLUTION INTRAVENOUS at 05:01

## 2024-11-10 RX ADMIN — PROPOFOL 2.75 MICROGRAM(S)/KG/MIN: 10 INJECTION, EMULSION INTRAVENOUS at 16:22

## 2024-11-10 RX ADMIN — PANTOPRAZOLE SODIUM 40 MILLIGRAM(S): 40 TABLET, DELAYED RELEASE ORAL at 05:00

## 2024-11-10 RX ADMIN — LOSARTAN POTASSIUM 50 MILLIGRAM(S): 100 TABLET, FILM COATED ORAL at 05:00

## 2024-11-10 RX ADMIN — ENOXAPARIN SODIUM 90 MILLIGRAM(S): 30 INJECTION SUBCUTANEOUS at 21:04

## 2024-11-10 RX ADMIN — FUROSEMIDE 40 MILLIGRAM(S): 40 TABLET ORAL at 10:36

## 2024-11-10 RX ADMIN — MEROPENEM 100 MILLIGRAM(S): 500 INJECTION, POWDER, FOR SOLUTION INTRAVENOUS at 13:28

## 2024-11-10 RX ADMIN — POTASSIUM CHLORIDE 20 MILLIEQUIVALENT(S): 600 TABLET, EXTENDED RELEASE ORAL at 10:36

## 2024-11-10 RX ADMIN — FENTANYL 4.58 MICROGRAM(S)/KG/HR: 12 PATCH, EXTENDED RELEASE TRANSDERMAL at 16:21

## 2024-11-10 RX ADMIN — FENTANYL 50 MICROGRAM(S): 12 PATCH, EXTENDED RELEASE TRANSDERMAL at 16:51

## 2024-11-10 RX ADMIN — CHLORHEXIDINE GLUCONATE 1 APPLICATION(S): 1.2 RINSE ORAL at 05:00

## 2024-11-10 RX ADMIN — AMLODIPINE BESYLATE 10 MILLIGRAM(S): 10 TABLET ORAL at 10:36

## 2024-11-10 RX ADMIN — ENOXAPARIN SODIUM 90 MILLIGRAM(S): 30 INJECTION SUBCUTANEOUS at 10:36

## 2024-11-10 RX ADMIN — CHLORHEXIDINE GLUCONATE 15 MILLILITER(S): 1.2 RINSE ORAL at 17:12

## 2024-11-10 RX ADMIN — FENTANYL 50 MICROGRAM(S): 12 PATCH, EXTENDED RELEASE TRANSDERMAL at 16:21

## 2024-11-10 RX ADMIN — PANTOPRAZOLE SODIUM 40 MILLIGRAM(S): 40 TABLET, DELAYED RELEASE ORAL at 17:12

## 2024-11-10 NOTE — PROGRESS NOTE ADULT - ASSESSMENT
73-year-old female PMH A-fib on xarelto,, HTN, s/p cholecystectomy, congenital solitary kidney presents from NH to the ED for evaluation of weakness, decreased PO intake and abdominal distension. Admitted for sepstic shock.    #Septic shock 2/2 cystitis vs intraabdominal infection  #HAGMA 2/2 lactic acidosis and uremia  - ED, BP 66/45 S/P LR 2800cc bolus with no improvement in BP  - wbc 19.29, vbg lactate 2.0-->2.3, pH 7.2, pCO2 45, Na 133, AG 21, Cr 4.3 (baseline 1.1),  - started on peripheral levo in the ED.  - Patient was tachypneic with high pco2 hence placed on BiPAP  - Ct abdomen/pelvis noted  -s/p cefepime and Vanc in the ED  - started on meropenem as per ID recs given UCx positive for ESBL in the past >> increased to 1g meropenem with the improvement of kidney function.  - started flucanazole 400mg loading dose and flucanzole 200mg q24 daily  - Trend lactate, ABG, AG  - Fever curve  - Lactate trend 2.3>>3.2>>1.6>>1.1>>0.8- WNL  - AG 21 >>32>>26- closed    11/5-   - weaned down to 0.15 levophed now- MAP stabilised - MAP-81  - BIPAP to HFNC 60/60 today- patient stating well.  - started on LR @100cc/hr     11/6  - On levophed 0.15. MAP stable  - Patient intubated     #Acute hypoxic respiratory failure  - Patient initially on BiPAP with only signs of tachypenia  - Overnight stating in 70s  - Lactate: 1.6> 1.1  11/5   - pH,  7.43 pCO2, 34 pO2, - 232 HCO3-  23FIO2- 100.0  - switched to HFNC.      11/6  - pH,  7.41 pCO2, 34 pO2, - 65 HCO3-  22 FIO2- 100.0  - Patient intubated     11/7- 11/8  - improved ABG  - stating well with intubation  - minimal vent settings - 380/10/14/40    #NOLA likely ATN 2/2 septic shock - resolved  - congenital Soliatry kidney  - Baseline creatinine - 1.1  - CR on adm- 4.4    Nephro consulted  -  Cr 4.3 > 4.2, Cr 1.4 on 11/1 and 1.0 on 7/5/24  - strict Is&Os  - daily BMP trend Cr correct lytes as needed  - f/u Ucx and Bcx   - obtain Urine studies  - c/w levophed, give fluids   - dose meropenem and flucanazole as eGFR of 30    #Hypernatremia  - 156 on 11/7   - started on D5W @225ml/hr  - Drop in sodium and now at 136  - d/c D5W   - Stat BMP  -Monitor for now    #Abdominal distention   #pneumoperitoneum  - CT noted  - NGT placed for gastric decompression with 3.1L feculent output - intermittent suction  - GI consulted to expedite the scope if necessary  - c/w abx   - Keep NPO   - NGT to intermittent suction   - Further stabilisation for EGD  - Vascular consulted    11/5  - NGT tube draining - feculent output    11/7  - EGD done    Multiple clean based small ulcers were seen in the esophagus likely from NGT trauma. .  	10 cm Ulcer in the stomach body (Greater curvature). (Biopsy).  	Ulcers in the antrum and stomach body.  	Erosions in the stomach compatible with erosive gastritis. (Biopsy).  	Normal mucosa in the whole examined duodenum.    PLAN   - Repeat CT with Gastrografin tomorrow.    - PPI IV BID    - Repeat EGD in two months.    - Will follow.    11/8  - Patient going for CT abd and pelvis with oral gastrografin   -  IV contrast for Ct angio to r/o mesentric ischemia    #Afib on xarelto  - Hold xarelto given the NOLA  - switch to heparin gtt given CrCl <30  - Hold AC until GI recs    #HTN  - on home - coozar 100 and nefedepine 60  - hold home BP meds as MAP is stabilized resume as needed    MISC    #DVT prophylaxis: Started on therapuetic lovenox from today  #GI prophylaxis: PPI  #Diet: NPO for now, Okay to use NGT for contrast  #Activity: out of bed to chair  #Code status: Full Code  #Disposition: Acute    #Handoff  - f/u CT abdomen and pelvis with oral gastrogriffin  - f/u vascular recs  - f/u surgery recs  - Pending CT angio abdomen and pelvis

## 2024-11-10 NOTE — PROGRESS NOTE ADULT - SUBJECTIVE AND OBJECTIVE BOX
Patient is a 73y old  Female who presents with a chief complaint of sepsis (09 Nov 2024 06:31)        Over Night Events:    Vented  off pressors  No acute overnight events    ROS:     All ROS are negative except HPI       PHYSICAL EXAM    ICU Vital Signs Last 24 Hrs  T(C): 37.2 (10 Nov 2024 08:00), Max: 37.4 (09 Nov 2024 20:00)  T(F): 99 (10 Nov 2024 08:00), Max: 99.3 (09 Nov 2024 20:00)  HR: 66 (10 Nov 2024 08:00) (62 - 79)  BP: 156/65 (10 Nov 2024 08:00) (129/58 - 198/79)  BP(mean): 94 (10 Nov 2024 08:00) (84 - 113)  ABP: --  ABP(mean): --  RR: 23 (10 Nov 2024 08:00) (20 - 32)  SpO2: 100% (10 Nov 2024 08:00) (96% - 100%)    O2 Parameters below as of 10 Nov 2024 08:00  Patient On (Oxygen Delivery Method): ventilator          CONSTITUTIONAL:  NAD    CARDIAC:   Normal rate,   Reguar rhythm.      RESPIRATORY:   No wheezing  Bilateral BS    GASTROINTESTINAL:  Abdomen soft,   Non-tender,   No guarding,     NEUROLOGICAL:   Follows commands    SKIN:   sacral DTI    11-09-24 @ 07:01  -  11-10-24 @ 07:00  --------------------------------------------------------  IN:    Glucerna: 340 mL    IV PiggyBack: 200 mL    Propofol: 162.2 mL    Propofol: 222 mL  Total IN: 924.2 mL    OUT:    Indwelling Catheter - Urethral (mL): 1580 mL  Total OUT: 1580 mL    Total NET: -655.8 mL      11-10-24 @ 07:01  -  11-10-24 @ 09:09  --------------------------------------------------------  IN:    Propofol: 13.7 mL  Total IN: 13.7 mL    OUT:    Indwelling Catheter - Urethral (mL): 125 mL  Total OUT: 125 mL    Total NET: -111.3 mL        LABS:                            8.4    13.20 )-----------( 100      ( 10 Nov 2024 04:24 )             26.7                                               11-10    145  |  109  |  18  ----------------------------<  84  3.4[L]   |  23  |  0.8    Ca    10.0      10 Nov 2024 04:24  Mg     1.9     11-10    TPro  4.3[L]  /  Alb  2.4[L]  /  TBili  0.4  /  DBili  x   /  AST  12  /  ALT  13  /  AlkPhos  154[H]  11-10                                             Urinalysis Basic - ( 10 Nov 2024 04:24 )    Color: x / Appearance: x / SG: x / pH: x  Gluc: 84 mg/dL / Ketone: x  / Bili: x / Urobili: x   Blood: x / Protein: x / Nitrite: x   Leuk Esterase: x / RBC: x / WBC x   Sq Epi: x / Non Sq Epi: x / Bacteria: x                                                LIVER FUNCTIONS - ( 10 Nov 2024 04:24 )  Alb: 2.4 g/dL / Pro: 4.3 g/dL / ALK PHOS: 154 U/L / ALT: 13 U/L / AST: 12 U/L / GGT: x                                                  Culture - Sputum (collected 08 Nov 2024 12:36)  Source: Trach Asp Tracheal Aspirate  Gram Stain (09 Nov 2024 07:34):    Few polymorphonuclear leukocytes per low power field    No Squamous epithelial cells per low power field    Rare Gram positive cocci in pairs seen per oil power field    Rare Gram Negative Rods seen per oil power field  Preliminary Report (09 Nov 2024 16:43):    No growth                                                   Mode: AC/ CMV (Assist Control/ Continuous Mandatory Ventilation)  RR (machine): 14  TV (machine): 380  FiO2: 40  PEEP: 10  ITime: 1  MAP: 17  PIP: 31                                      ABG - ( 10 Nov 2024 02:12 )  pH, Arterial: 7.46  pH, Blood: x     /  pCO2: 37    /  pO2: 137   / HCO3: 26    / Base Excess: 2.5   /  SaO2: 96.0          MEDICATIONS  (STANDING):  chlorhexidine 0.12% Liquid 15 milliLiter(s) Oral Mucosa every 12 hours  chlorhexidine 2% Cloths 1 Application(s) Topical <User Schedule>  enoxaparin Injectable 90 milliGRAM(s) SubCutaneous every 12 hours  losartan 50 milliGRAM(s) Oral daily  meropenem  IVPB 1000 milliGRAM(s) IV Intermittent every 8 hours  norepinephrine Infusion 0.05 MICROgram(s)/kG/Min (8.59 mL/Hr) IV Continuous <Continuous>  pantoprazole  Injectable 40 milliGRAM(s) IV Push two times a day  propofol Infusion 5.004 MICROgram(s)/kG/Min (2.75 mL/Hr) IV Continuous <Continuous>    MEDICATIONS  (PRN):  acetaminophen     Tablet .. 650 milliGRAM(s) Oral every 6 hours PRN Temp greater or equal to 38C (100.4F)  fentaNYL    Injectable 50 MICROGram(s) IV Push daily PRN sedation    New X-rays reviewed:                                                                                  ECHO

## 2024-11-10 NOTE — PROGRESS NOTE ADULT - ASSESSMENT
ASSESSMENT  73-year-old female PMH A-fib on xarelto,, HTN, s/p cholecystectomy, congenital solitary kidney presents from NH to the ED for evaluation of weakness, decreased PO intake and abdominal distension. Pt's son Everton says pt has not been feeling well the past few days, and has had poor appetite which is unlike her. She's been more tired and weak, unable to get out of bed. Has not had a bowel movement in 3 days, pt unsure if she's been passing gas otherwise. She was noted to be hypotensive and have an elevated wbc count at the NH as well. No dysuria, chest pain, SOB, cough or fever per pt otherwise.  No other complaints currently.     In the ED, BP 66/45 S/P LR 2800cc bolus with no improvement in BP, started on peripheral levo. Labs with wbc 19.29, vbg lactate 2.0-->2.3, pH 7.2, pCO2 45, Na 133, AG 21, Cr 4.3 (baseline 1.1), UA contaminated. CTAP with severe gastric distension, multiple foci of intraperitoneal free air and urinary bladder thickening with free air. Surgery consulted, no intervention for now, NGT placed for gastric decompression with 3.1L feculent output,     IMPRESSIONS  #Fever    11/8 sputum   No growth  # intubated on mechanical ventilation   #Septic shock requiring pressors   Rule out acute cystitis/pyelo as UA pyuria > 900; UCX   >100,000 CFU/ml Streptococcus gallolyticus "Susceptibilities not performed"  Rule out mesenteric ischemia- Severe gastric distension w/ pneumatosis along posterior gastric wall. Free air     Admission WBC 19--> 21    11/3 BCX NGTD x2  11/8 CTAP repeat   In comparison to 11/3/2024:  1.  Interval placement of enteric tube with previously seen gastric   distention decompressed. Previously seen pneumatosis no longer visualized   on the current study. Previous small locules of free intraperitoneal air   no longer visualized. Previously seen trace pneumobilia is no longer   visualized.  2.  Bilateral lower lobe consolidations with trace right greater than   left underlying effusions. Atelectasis and/or pneumonia.  3.  There appears to be a focal region of narrowing in the colon at the   hepatic flexure with large lobulated soft tissue density in the right   colon and cecum not typical appearance of stool. Neoplastic process   cannot be excluded. Consider correlation with colonoscopy as clinically   appropriate.    Stool cx NG    1/2023 UCX ESBL proteus     Cdiff (-)   #Lactic acidosis  #NOLA   Creatinine: 0.9 mg/dL (11.08.24 @ 04:07) crcl 65    RECOMMENDATIONS  - If fever, send BCX  - Trend fever curve, WBC  - Meropenem  IVPB 1000 milliGRAM(s) IV Intermittent every 8 hours, continue given critical illness 11/3-, tentative 10 day end date 11/13  - Strep gallolyticus can be associated with underlying GI malignancy , CT with  large lobulated soft tissue density in the right   colon and cecum not typical appearance of stool. High suspicion. workup per primary team   - GI / Surgery   - Poor prognosis, GOC    If any questions, please text or call on [employer] Teams  Please continue to update ID with any pertinent new clinical, laboratory or radiographic findings

## 2024-11-10 NOTE — PROGRESS NOTE ADULT - SUBJECTIVE AND OBJECTIVE BOX
ACUTE CARE SURGERY PROGRESS NOTE     Patient: PATRICIA SPRAGUE , 73y ([birth year])Female   MRN: [id number]  Location: [address]  Visit: 11-03-24 Inpatient  Date: 11-10-24 @ 06:12        Admitted :11-03-24 (7d)  LOS: 7d    Procedure/Dx/Injuries: Sepsis    Sepsis due to urinary tract infection        Events of past 24 hours:   Patient seen and examined at bedside.   NGT removed. OGT in place, TF @40cc/hr.   On vent 40/10, sedated w/ 30 Propofol.   No acute events overnight. Afebrile, VSS.  >>> <<<>>> <<<>>> <<<>>> <<<>>> <<<>>> <<<>>> <<<>>> <<<>>> <<<>>> <<<    Vitals:   T(F): 99 (11-10-24 @ 04:00), Max: 99.5 (11-09-24 @ 08:00)  HR: 70 (11-10-24 @ 05:00)  BP: 146/64 (11-10-24 @ 05:00)  RR: 22 (11-10-24 @ 05:00)  SpO2: 100% (11-10-24 @ 05:00)  Mode: AC/ CMV (Assist Control/ Continuous Mandatory Ventilation), RR (machine): 14, TV (machine): 380, FiO2: 40, PEEP: 10, ITime: 0.9, MAP: 15, PIP: 27    PHYSICAL EXAM:  General Appearance: Intubated  HEENT: Normocephalic, atraumatic  Heart: Normal rate   Lungs: Vent 40/10  Abdomen: Distended   MSK/Extremities: Warm & well-perfused.   Skin: Warm, dry. No jaundice.   >>> <<<>>> <<<>>> <<<>>> <<<>>> <<<>>> <<<>>> <<<>>> <<<>>> <<<>>> <<<   Is & Os:   Diet, NPO with Tube Feed:   Tube Feeding Modality: Orogastric  Glucerna 1.2 Jeff (GLUCERNARTH)  Total Volume for 24 Hours (mL): 990  Continuous  Starting Tube Feed Rate mL per Hour: 20  Increase Tube Feed Rate by (mL): 20  Until Goal Tube Feed Rate (mL per Hour): 55  Tube Feed Duration (in Hours): 18  Tube Feed Start Time: 18:00  Tube Feed Stop Time: 12:00    Fluids:     11-08-24 @ 07:01  -  11-09-24 @ 07:00  --------------------------------------------------------  IN:    dextrose 5%: 225 mL    FentaNYL: 151.2 mL    IV PiggyBack: 350 mL    Propofol: 82.5 mL  Total IN: 808.7 mL    OUT:    Indwelling Catheter - Urethral (mL): 1560 mL    Nasogastric/Oral tube (mL): 50 mL  Total OUT: 1610 mL    Total NET: -801.3 mL        >>> <<<>>> <<<>>> <<<>>> <<<>>> <<<>>> <<<>>> <<<>>> <<<>>> <<<>>> <<<    MEDICATIONS  (STANDING):  chlorhexidine 0.12% Liquid 15 milliLiter(s) Oral Mucosa every 12 hours  chlorhexidine 2% Cloths 1 Application(s) Topical <User Schedule>  enoxaparin Injectable 90 milliGRAM(s) SubCutaneous every 12 hours  losartan 50 milliGRAM(s) Oral daily  meropenem  IVPB 1000 milliGRAM(s) IV Intermittent every 8 hours  norepinephrine Infusion 0.05 MICROgram(s)/kG/Min (8.59 mL/Hr) IV Continuous <Continuous>  pantoprazole  Injectable 40 milliGRAM(s) IV Push two times a day  propofol Infusion 5.004 MICROgram(s)/kG/Min (2.75 mL/Hr) IV Continuous <Continuous>    MEDICATIONS  (PRN):  acetaminophen     Tablet .. 650 milliGRAM(s) Oral every 6 hours PRN Temp greater or equal to 38C (100.4F)  fentaNYL    Injectable 50 MICROGram(s) IV Push daily PRN sedation      DVT PROPHYLAXIS: enoxaparin Injectable 90 milliGRAM(s) SubCutaneous every 12 hours    GI PROPHYLAXIS: pantoprazole  Injectable 40 milliGRAM(s) IV Push two times a day    ANTIBIOTICS:  meropenem  IVPB 1000 milliGRAM(s)      >>> <<<>>> <<<>>> <<<>>> <<<>>> <<<>>> <<<>>> <<<>>> <<<>>> <<<>>> <<<        LAB/STUDIES:  Labs:  CAPILLARY BLOOD GLUCOSE      POCT Blood Glucose.: 78 mg/dL (09 Nov 2024 06:35)                          8.4    13.20 )-----------( 100      ( 10 Nov 2024 04:24 )             26.7       Auto Neutrophil %: 75.6 % (11-10-24 @ 04:24)  Auto Immature Granulocyte %: 2.0 % (11-10-24 @ 04:24)    11-10    145  |  109  |  18  ----------------------------<  84  3.4[L]   |  23  |  0.8      Calcium: 10.0 mg/dL (11-10-24 @ 04:24)      LFTs:             4.3  | 0.4  | 12       ------------------[154     ( 10 Nov 2024 04:24 )  2.4  | x    | 13              Blood Gas Arterial, Lactate: 0.6 mmol/L (11-10-24 @ 02:12)  Blood Gas Arterial, Lactate: 0.7 mmol/L (11-09-24 @ 03:04)  Blood Gas Arterial, Lactate: 1.6 mmol/L (11-08-24 @ 03:22)    ABG - ( 10 Nov 2024 02:12 )  pH: 7.46  /  pCO2: 37    /  pO2: 137   / HCO3: 26    / Base Excess: 2.5   /  SaO2: 96.0            ABG - ( 09 Nov 2024 03:04 )  pH: 7.39  /  pCO2: 46    /  pO2: 92    / HCO3: 28    / Base Excess: 2.2   /  SaO2: 96.0            ABG - ( 08 Nov 2024 03:22 )  pH: 7.37  /  pCO2: 46    /  pO2: 77    / HCO3: 27    / Base Excess: 0.8   /  SaO2: 95.1                Urinalysis Basic - ( 10 Nov 2024 04:24 )    Color: x / Appearance: x / SG: x / pH: x  Gluc: 84 mg/dL / Ketone: x  / Bili: x / Urobili: x   Blood: x / Protein: x / Nitrite: x   Leuk Esterase: x / RBC: x / WBC x   Sq Epi: x / Non Sq Epi: x / Bacteria: x        Culture - Sputum (collected 08 Nov 2024 12:36)  Source: Trach Asp Tracheal Aspirate  Gram Stain (09 Nov 2024 07:34):    Few polymorphonuclear leukocytes per low power field    No Squamous epithelial cells per low power field    Rare Gram positive cocci in pairs seen per oil power field    Rare Gram Negative Rods seen per oil power field  Preliminary Report (09 Nov 2024 16:43):    No growth            >>> <<<>>> <<<>>> <<<>>> <<<>>> <<<>>> <<<>>> <<<>>> <<<>>> <<<>>> <<<  ASSESSMENT:  73-year-old female PMH A-fib on xarelto, HTN, s/p cholecystectomy, congenital solitary kidney admitted for gastric distention.     PLAN:  - C/W TF to goal as tolerated   - Rest of care per primary team       EGS TEAM SPECTRA [id number] ACUTE CARE SURGERY PROGRESS NOTE     Patient: PATRICIA SPRAGUE , 73y ([birth year])Female   MRN: [id number]  Location: [address]  Visit: 11-03-24 Inpatient  Date: 11-10-24 @ 06:12        Admitted :11-03-24 (7d)  LOS: 7d    Procedure/Dx/Injuries: Sepsis    Sepsis due to urinary tract infection        Events of past 24 hours:   Patient seen and examined at bedside.   NGT removed. OGT in place, TF @40cc/hr.   On vent 40/10, sedated w/ 30 Propofol.   No acute events overnight. Afebrile, VSS.  >>> <<<>>> <<<>>> <<<>>> <<<>>> <<<>>> <<<>>> <<<>>> <<<>>> <<<>>> <<<    Vitals:   T(F): 99 (11-10-24 @ 04:00), Max: 99.5 (11-09-24 @ 08:00)  HR: 70 (11-10-24 @ 05:00)  BP: 146/64 (11-10-24 @ 05:00)  RR: 22 (11-10-24 @ 05:00)  SpO2: 100% (11-10-24 @ 05:00)  Mode: AC/ CMV (Assist Control/ Continuous Mandatory Ventilation), RR (machine): 14, TV (machine): 380, FiO2: 40, PEEP: 10, ITime: 0.9, MAP: 15, PIP: 27    PHYSICAL EXAM:  General Appearance: Intubated  HEENT: Normocephalic, atraumatic  Heart: Normal rate   Lungs: Vent 40/10  Abdomen: Distended   MSK/Extremities: Warm & well-perfused.   Skin: Warm, dry. No jaundice.   >>> <<<>>> <<<>>> <<<>>> <<<>>> <<<>>> <<<>>> <<<>>> <<<>>> <<<>>> <<<   Is & Os:   Diet, NPO with Tube Feed:   Tube Feeding Modality: Orogastric  Glucerna 1.2 Jeff (GLUCERNARTH)  Total Volume for 24 Hours (mL): 990  Continuous  Starting Tube Feed Rate mL per Hour: 20  Increase Tube Feed Rate by (mL): 20  Until Goal Tube Feed Rate (mL per Hour): 55  Tube Feed Duration (in Hours): 18  Tube Feed Start Time: 18:00  Tube Feed Stop Time: 12:00    Fluids:     11-08-24 @ 07:01  -  11-09-24 @ 07:00  --------------------------------------------------------  IN:    dextrose 5%: 225 mL    FentaNYL: 151.2 mL    IV PiggyBack: 350 mL    Propofol: 82.5 mL  Total IN: 808.7 mL    OUT:    Indwelling Catheter - Urethral (mL): 1560 mL    Nasogastric/Oral tube (mL): 50 mL  Total OUT: 1610 mL    Total NET: -801.3 mL        >>> <<<>>> <<<>>> <<<>>> <<<>>> <<<>>> <<<>>> <<<>>> <<<>>> <<<>>> <<<    MEDICATIONS  (STANDING):  chlorhexidine 0.12% Liquid 15 milliLiter(s) Oral Mucosa every 12 hours  chlorhexidine 2% Cloths 1 Application(s) Topical <User Schedule>  enoxaparin Injectable 90 milliGRAM(s) SubCutaneous every 12 hours  losartan 50 milliGRAM(s) Oral daily  meropenem  IVPB 1000 milliGRAM(s) IV Intermittent every 8 hours  norepinephrine Infusion 0.05 MICROgram(s)/kG/Min (8.59 mL/Hr) IV Continuous <Continuous>  pantoprazole  Injectable 40 milliGRAM(s) IV Push two times a day  propofol Infusion 5.004 MICROgram(s)/kG/Min (2.75 mL/Hr) IV Continuous <Continuous>    MEDICATIONS  (PRN):  acetaminophen     Tablet .. 650 milliGRAM(s) Oral every 6 hours PRN Temp greater or equal to 38C (100.4F)  fentaNYL    Injectable 50 MICROGram(s) IV Push daily PRN sedation      DVT PROPHYLAXIS: enoxaparin Injectable 90 milliGRAM(s) SubCutaneous every 12 hours    GI PROPHYLAXIS: pantoprazole  Injectable 40 milliGRAM(s) IV Push two times a day    ANTIBIOTICS:  meropenem  IVPB 1000 milliGRAM(s)      >>> <<<>>> <<<>>> <<<>>> <<<>>> <<<>>> <<<>>> <<<>>> <<<>>> <<<>>> <<<        LAB/STUDIES:  Labs:  CAPILLARY BLOOD GLUCOSE      POCT Blood Glucose.: 78 mg/dL (09 Nov 2024 06:35)                          8.4    13.20 )-----------( 100      ( 10 Nov 2024 04:24 )             26.7       Auto Neutrophil %: 75.6 % (11-10-24 @ 04:24)  Auto Immature Granulocyte %: 2.0 % (11-10-24 @ 04:24)    11-10    145  |  109  |  18  ----------------------------<  84  3.4[L]   |  23  |  0.8      Calcium: 10.0 mg/dL (11-10-24 @ 04:24)      LFTs:             4.3  | 0.4  | 12       ------------------[154     ( 10 Nov 2024 04:24 )  2.4  | x    | 13              Blood Gas Arterial, Lactate: 0.6 mmol/L (11-10-24 @ 02:12)  Blood Gas Arterial, Lactate: 0.7 mmol/L (11-09-24 @ 03:04)  Blood Gas Arterial, Lactate: 1.6 mmol/L (11-08-24 @ 03:22)    ABG - ( 10 Nov 2024 02:12 )  pH: 7.46  /  pCO2: 37    /  pO2: 137   / HCO3: 26    / Base Excess: 2.5   /  SaO2: 96.0            ABG - ( 09 Nov 2024 03:04 )  pH: 7.39  /  pCO2: 46    /  pO2: 92    / HCO3: 28    / Base Excess: 2.2   /  SaO2: 96.0            ABG - ( 08 Nov 2024 03:22 )  pH: 7.37  /  pCO2: 46    /  pO2: 77    / HCO3: 27    / Base Excess: 0.8   /  SaO2: 95.1                Urinalysis Basic - ( 10 Nov 2024 04:24 )    Color: x / Appearance: x / SG: x / pH: x  Gluc: 84 mg/dL / Ketone: x  / Bili: x / Urobili: x   Blood: x / Protein: x / Nitrite: x   Leuk Esterase: x / RBC: x / WBC x   Sq Epi: x / Non Sq Epi: x / Bacteria: x        Culture - Sputum (collected 08 Nov 2024 12:36)  Source: Trach Asp Tracheal Aspirate  Gram Stain (09 Nov 2024 07:34):    Few polymorphonuclear leukocytes per low power field    No Squamous epithelial cells per low power field    Rare Gram positive cocci in pairs seen per oil power field    Rare Gram Negative Rods seen per oil power field  Preliminary Report (09 Nov 2024 16:43):    No growth            >>> <<<>>> <<<>>> <<<>>> <<<>>> <<<>>> <<<>>> <<<>>> <<<>>> <<<>>> <<<  ASSESSMENT:  73-year-old female PMH A-fib on xarelto, HTN, s/p cholecystectomy, congenital solitary kidney admitted for gastric distention.     PLAN:  - C/W TF to goal as tolerated   - Surgery to sign off   - Recall surgery as needed   - Rest of care per primary team       EGS TEAM SPECTRA [id number]

## 2024-11-10 NOTE — PROGRESS NOTE ADULT - SUBJECTIVE AND OBJECTIVE BOX
PATRICIA SPRAGUE  73y, Female  Allergy: No Known Allergies      LOS  7d    CHIEF COMPLAINT: sepsis (09 Nov 2024 06:31)      INTERVAL EVENTS/HPI  - T(F): , Max: 99.3 (11-09-24 @ 20:00) Afebrile   - WBC Count: 13.20 (11-10-24 @ 04:24) downtrending   WBC Count: 15.27 (11-09-24 @ 04:21)     - Creatinine: 0.8 (11-10-24 @ 04:24)  Creatinine: 0.8 (11-09-24 @ 04:21)     -   -   -     ROS  cannot obtain secondary to patient's sedation and/or mental status    VITALS:  T(F): 99, Max: 99.3 (11-09-24 @ 20:00)  HR: 104  BP: 142/63  RR: 22Vital Signs Last 24 Hrs  T(C): 37.2 (10 Nov 2024 12:00), Max: 37.4 (09 Nov 2024 20:00)  T(F): 99 (10 Nov 2024 12:00), Max: 99.3 (09 Nov 2024 20:00)  HR: 104 (10 Nov 2024 15:32) (64 - 104)  BP: 142/63 (10 Nov 2024 14:00) (129/58 - 192/77)  BP(mean): 90 (10 Nov 2024 14:00) (84 - 110)  RR: 22 (10 Nov 2024 14:00) (20 - 32)  SpO2: 97% (10 Nov 2024 15:32) (97% - 100%)    Parameters below as of 10 Nov 2024 12:00  Patient On (Oxygen Delivery Method): ventilator        PHYSICAL EXAM:  General: intubated  HEENT: NCAT  Neck: supple  CV: RRR  Lungs: symmetric chest expansion, decreased BS at bases  Abd: Soft  Extr: wwp  Skin: no rash  Neuro: sedated  Lines: no phlebitis     FH: Non-contributory  Social Hx: Non-contributory    TESTS & MEASUREMENTS:                        8.4    13.20 )-----------( 100      ( 10 Nov 2024 04:24 )             26.7     11-10    145  |  109  |  18  ----------------------------<  84  3.4[L]   |  23  |  0.8    Ca    10.0      10 Nov 2024 04:24  Mg     1.9     11-10    TPro  4.3[L]  /  Alb  2.4[L]  /  TBili  0.4  /  DBili  x   /  AST  12  /  ALT  13  /  AlkPhos  154[H]  11-10      LIVER FUNCTIONS - ( 10 Nov 2024 04:24 )  Alb: 2.4 g/dL / Pro: 4.3 g/dL / ALK PHOS: 154 U/L / ALT: 13 U/L / AST: 12 U/L / GGT: x           Urinalysis Basic - ( 10 Nov 2024 04:24 )    Color: x / Appearance: x / SG: x / pH: x  Gluc: 84 mg/dL / Ketone: x  / Bili: x / Urobili: x   Blood: x / Protein: x / Nitrite: x   Leuk Esterase: x / RBC: x / WBC x   Sq Epi: x / Non Sq Epi: x / Bacteria: x        Culture - Sputum (collected 11-08-24 @ 12:36)  Source: Trach Asp Tracheal Aspirate  Gram Stain (11-09-24 @ 07:34):    Few polymorphonuclear leukocytes per low power field    No Squamous epithelial cells per low power field    Rare Gram positive cocci in pairs seen per oil power field    Rare Gram Negative Rods seen per oil power field  Final Report (11-10-24 @ 09:45):    No growth    Urinalysis with Rflx Culture (collected 11-03-24 @ 13:51)    Culture - Stool (collected 11-03-24 @ 13:51)  Source: .Stool  Final Report (11-05-24 @ 19:02):    No enteric pathogens isolated.    (Stool culture examined for Salmonella,    Shigella, Campylobacter, Aeromonas, Plesiomonas,    Vibrio, E.coli O157 and Yersinia)    Culture - Urine (collected 11-03-24 @ 13:51)  Source: Clean Catch None  Final Report (11-05-24 @ 12:11):    >100,000 CFU/ml Streptococcus gallolyticus "Susceptibilities not    performed"    <10,000 CFU/ml Normal Urogenital rosalva present    Culture - Blood (collected 11-03-24 @ 11:10)  Source: .Blood BLOOD  Final Report (11-08-24 @ 18:00):    No growth at 5 days    Culture - Blood (collected 11-03-24 @ 11:10)  Source: .Blood BLOOD  Final Report (11-08-24 @ 18:00):    No growth at 5 days            INFECTIOUS DISEASES TESTING  Procalcitonin: 5.22 (11-05-24 @ 19:32)  MRSA PCR Result.: Negative (11-04-24 @ 13:15)      INFLAMMATORY MARKERS      RADIOLOGY & ADDITIONAL TESTS:  I have personally reviewed the last available Chest xray  CXR  Xray Chest 1 View- PORTABLE-Urgent:   ACC: [id number] EXAM:  XR CHEST PORTABLE URGENT 1V   ORDERED BY: CEDRICK CHEEK     PROCEDURE DATE:  11/09/2024          INTERPRETATION:  CLINICAL HISTORY: Enteric tube.    COMPARISON: Same day.    TECHNIQUE: Portable frontal chest radiograph. Adequate positioning.    FINDINGS:    Support devices: Stable enteric tube courses below the diaphragm. Stable   ET tube.    Cardiac/mediastinum/hilum: Unchanged    Lung parenchyma/Pleura: Right parenchymal opacities and bibasilar   effusions without difference.    Skeleton/soft tissues: Unchanged      IMPRESSION:    Bilateral opacities and effusions without difference.    --- End of Report ---            CASSIE HUTSON MD; Attending Radiologist  This document has been electronically signed. Nov 10 2024  8:38AM (11-09-24 @ 17:11)      CT      CARDIOLOGY TESTING  12 Lead ECG:   Ventricular Rate 87 BPM    Atrial Rate 85 BPM    QRS Duration 110 ms    Q-T Interval 356 ms    QTC Calculation(Bazett) 428 ms    R Axis 67 degrees    T Axis -10 degrees    Diagnosis Line Atrial fibrillation  Low voltage QRS  Abnormal QRS-T angle, consider primary T wave abnormality  Abnormal ECG    Confirmed by Sanjeev Pierce ([id number]) on 11/7/2024 9:09:37 AM (11-06-24 @ 12:32)      MEDICATIONS  amLODIPine   Tablet 10  chlorhexidine 0.12% Liquid 15  chlorhexidine 2% Cloths 1  enoxaparin Injectable 90  losartan 50  meropenem  IVPB 1000  norepinephrine Infusion 0.05  pantoprazole  Injectable 40  propofol Infusion 5.004      WEIGHT  Weight (kg): 91.6 (11-05-24 @ 01:00)  Creatinine: 0.8 mg/dL (11-10-24 @ 04:24)      ANTIBIOTICS:  meropenem  IVPB 1000 milliGRAM(s) IV Intermittent every 8 hours      All available historical records have been reviewed

## 2024-11-10 NOTE — PROGRESS NOTE ADULT - SUBJECTIVE AND OBJECTIVE BOX
Today is hospital day 6d. No acute overnight events.     11/6- This morning patient was seen and examined at bedside, resting comfortably in bed. Patient was noted to have sat 70s and tachypneic to 40-50 on the HFNC. She was placed back on BIPAP 100%. She was Offered ventilatory support which she emphatically declined. At this time, she was mentating well, but more lethargic than her usual. The risk of failure was explained to her, however she expressed that she wanted a chance to breath before intubating her. Meanwhile family was notified, and the family requested everything be done to keep her alive. Because she was only tachypneic without any accessory muscle use, she was monitored on NIV, on which she began to show improvement.   Patient today morning was stating in 80s on the BiPAP. positional changes were made and she was stating well. But later after a while she was noticed to have high RR with accessory muscles for respiration. Patient was explained the need of intubation. Patient family approached to obtain consent for intubation. Family agrees. Patient intubated. NGT suction draining brown fluid - possible fecal matter. Vascular surgery consulted in suspicion of mesentric ischemia.    HOSPITAL COURSE  73-year-old female PMH A-fib on xarelto,, HTN, s/p cholecystectomy, congenital solitary kidney presents from NH to the ED for evaluation of weakness, decreased PO intake and abdominal distension. Pt's son Everton says pt has not been feeling well the past few days, and has had poor appetite which is unlike her. She's been more tired and weak, unable to get out of bed. Has not had a bowel movement in 3 days, pt unsure if she's been passing gas otherwise. She was noted to be hypotensive and have an elevated wbc count at the NH as well. No dysuria, chest pain, SOB, cough or fever per pt otherwise.  No other complaints currently.     In the ED, BP 66/45 S/P LR 2800cc bolus with no improvement in BP, started on peripheral levo. Labs with wbc 19.29, vbg lactate 2.0-->2.3, pH 7.2, pCO2 45, Na 133, AG 21, Cr 4.3 (baseline 1.1), UA contaminated. CTAP with severe gastric distension, multiple foci of intraperitoneal free air and urinary bladder thickening with free air. Surgery consulted, no intervention for now, NGT placed for gastric decompression with 3.1L feculent output, recommend GI consult.      MEDICATIONS  (STANDING):  chlorhexidine 0.12% Liquid 15 milliLiter(s) Oral Mucosa every 12 hours  chlorhexidine 2% Cloths 1 Application(s) Topical <User Schedule>  dexMEDEtomidine Infusion 0.05 MICROgram(s)/kG/Hr (1.15 mL/Hr) IV Continuous <Continuous>  diatrizoate meglumine/diatrizoate sodium. 30 milliLiter(s) Oral once  enoxaparin Injectable 90 milliGRAM(s) SubCutaneous every 12 hours  fentaNYL   Infusion 0.5 MICROgram(s)/kG/Hr (4.58 mL/Hr) IV Continuous <Continuous>  fluconAZOLE IVPB 200 milliGRAM(s) IV Intermittent every 24 hours  meropenem  IVPB 1000 milliGRAM(s) IV Intermittent every 8 hours  pantoprazole  Injectable 40 milliGRAM(s) IV Push two times a day    MEDICATIONS  (PRN):      Vital Signs Last 24 Hrs  T(C): 37.3 (10 Nov 2024 00:00), Max: 37.9 (09 Nov 2024 04:00)  T(F): 99.1 (10 Nov 2024 00:00), Max: 100.2 (09 Nov 2024 04:00)  HR: 69 (10 Nov 2024 03:00) (62 - 89)  BP: 153/67 (10 Nov 2024 03:00) (81/49 - 198/79)  BP(mean): 97 (10 Nov 2024 03:00) (61 - 113)  RR: 22 (10 Nov 2024 03:00) (18 - 32)  SpO2: 99% (10 Nov 2024 03:00) (91% - 100%)    Parameters below as of 10 Nov 2024 02:00  Patient On (Oxygen Delivery Method): ventilator          Physical Exam:   GENERAL: elderly appearing, Et tube in place  HEENT: Normocephalic, atraumatic  PULMONARY: Clear to auscultation bilaterally. No rales, ronchi, or wheezing.   CARDIOVASCULAR: Regular rate and rhythm, S1-S2, no murmurs   GASTROINTESTINAL: Soft, non-tender, non-distended, no guarding.   SKIN/EXTREMITIES: No LE edema b/l  NEUROLOGIC: AAOX3          LABS:                          7.7    15.27 )-----------( 108      ( 09 Nov 2024 04:21 )             25.0     11-09    143  |  109  |  23[H]  ----------------------------<  76  3.5   |  25  |  0.8    Ca    10.4      09 Nov 2024 04:21  Mg     1.5     11-09    TPro  4.1[L]  /  Alb  2.4[L]  /  TBili  0.6  /  DBili  x   /  AST  19  /  ALT  20  /  AlkPhos  176[H]  11-09    LIVER FUNCTIONS - ( 09 Nov 2024 04:21 )  Alb: 2.4 g/dL / Pro: 4.1 g/dL / ALK PHOS: 176 U/L / ALT: 20 U/L / AST: 19 U/L / GGT: x             Urinalysis Basic - ( 09 Nov 2024 04:21 )    Color: x / Appearance: x / SG: x / pH: x  Gluc: 76 mg/dL / Ketone: x  / Bili: x / Urobili: x   Blood: x / Protein: x / Nitrite: x   Leuk Esterase: x / RBC: x / WBC x   Sq Epi: x / Non Sq Epi: x / Bacteria: x      Urinalysis with Rflx Culture (collected 03 Nov 2024 13:51)    Culture - Stool (collected 03 Nov 2024 13:51)  Source: .Stool  Preliminary Report (04 Nov 2024 21:38):    No enteric pathogens to date: Final culture pending    Culture - Blood (collected 03 Nov 2024 11:10)  Source: .Blood BLOOD  Preliminary Report (04 Nov 2024 18:01):    No growth at 24 hours    Culture - Blood (collected 03 Nov 2024 11:10)  Source: .Blood BLOOD  Preliminary Report (04 Nov 2024 18:01):    No growth at 24 hours      IMAGING    Prior EGD/ Colonoscopy:  < from: EGD w/ PEG Placement (02.16.23 @ 10:30) >  Impressions:    Grade D esophagitis compatible with erosive esophagitis.    Erythema in the stomach compatible with non-erosive gastritis.    Hiatal Hernia.    Normal mucosa in the whole examined duodenum.    < end of copied text >    US ABDOMEN RT UPR QUADRANT  IMPRESSION:  Status post cholecystectomy. Heterogeneous poorly visualized liver.   Previously noted portal venous air on prior CAT scan not well appreciated   on this limited exam.    XR CHEST PORTABLE ROUTINE 1V  IMPRESSION:    1. Unchanged bilateral opacities/pleural effusions.    ECHO- 02/23  Summary:   1. Left ventricular ejection fraction, by visual estimation, is >70%.   2. Hyperdynamic global left ventricular systolic function.   3. The left ventricular diastolic function could not be assessed in this   study.   4. Left atrial enlargement.   5. Calcified aortic valve with normal opening.   6. Mild tricuspid regurgitation.   7. Estimated pulmonary artery systolic pressure is 39.5 mmHg assuming a   right atrial pressure of 3 mmHg, which is consistent with borderline   pulmonary hypertension.    ECHO  Summary:   1. Technically difficult study.   2. Hyperdynamic global left ventricular systolic function with a biplane   EF of 71%. Indeterminate diastolic function. Suboptimal endocardial   visualization; patient refused echocontrast.   3. Grossly normal right ventricular size and function.   4. Left atrial enlargement by visual assessment.   5. Fibrocalcific aortic valve with mild to moderate aortic valve   stenosis (Vmax 2.99m/s, mean PG 21mmHg, DI 0.59, DONNA 1.76cm2). Note that   gradients may be increased by hyperdynamic left ventricular function.   6. Adequate TR velocity was not obtained to accurately assess RVSP.   7. Trivial pericardial effusion.      CT ABDOMEN AND PELVIS    IMPRESSION:    1. Severe gastric distention extending to the level of the first/second   portion of duodenum, with new pneumatosis along the posterior gastric   wall, suspicious for ischemia; multiple adjacent foci of free   intraperitoneal air are noted.  2. New small linear foci of air within the peripheral liver, suspicious   for portal venous gas.  3. Thick-walled urinary bladder containing multiple foci of air; findings   may be related to recent intervention and/or underlying cystitis, however   however please note colovesicular fistula can have this appearance.   Clinical correlation is suggested.  4. Small bilateral pleural effusions with small bibasilar   opacities/atelectasis.      XR KUB 1 VIEW - 11/08    impression:    Tip of nasogastric tube overlies the right lower quadrant. There is a   pelvic catheter    Nonobstructive bowel gas pattern. No evidence of air-filled stomach on   this examination.    There is a left base opacity.    Surgical clips in the right upper quadrant    Stable osseous structures.       XR CHEST PORTABLE ROUTINE - 11/08  Impression:    Bilateral opacities and effusions similar to prior      CT angio abd/pelvis

## 2024-11-10 NOTE — PROGRESS NOTE ADULT - ASSESSMENT
IMPRESSION:    Acute hypoxemic respiratory failure  Septic shock - resolved  UTI  Severe gastric distension w/ pneumatosis along posterior gastric wall  Possible aspiration   Intraperitoneal free air  Thrombocytopenia   gastric ulcer   HAGMA  NOLA non oliguric  Afib on Xarelto  HO HTN  HO ESBL Proteus    PLAN:    CNS: SAT daily. Pain control as needed. Avoid opiates     HEENT: Oral care. ET care     PULMONARY:  HOB @ 45 degrees.  Aspiration precautions. vent changes: change peep to 8.  Monitor airway pressures, SBT today    CARDIOVASCULAR: TTE. Goal directed fluid resuscitation. c/w losartan, add amlodipine, lasix 40mg x1    GI: GI prophylaxis.  NGT with trickle feeds, Surgery following. GI eval noted: s/p EGD. f.u CT Angio abd and pelvis w/ PO, PPI BID    RENAL:  Follow up lytes. Correct as needed.  Strict intake and output.  Burger for is and Os.    INFECTIOUS DISEASE: Follow up cultures. Meropenem and Fluconazole. ID eval noted.  MRSA nares neg. f/u ID reccs     HEMATOLOGICAL:  Resume AC. Monitor CBC and coags. HIT and DIC negative    ENDOCRINE:  Follow up FS. Insulin protocol if needed.    MUSCULOSKELETAL: Bed chair position.  Off loading    Full code    MICU  Burger

## 2024-11-11 LAB
ALBUMIN SERPL ELPH-MCNC: 2.5 G/DL — LOW (ref 3.5–5.2)
ALP SERPL-CCNC: 155 U/L — HIGH (ref 30–115)
ALT FLD-CCNC: 11 U/L — SIGNIFICANT CHANGE UP (ref 0–41)
ANION GAP SERPL CALC-SCNC: 8 MMOL/L — SIGNIFICANT CHANGE UP (ref 7–14)
AST SERPL-CCNC: 10 U/L — SIGNIFICANT CHANGE UP (ref 0–41)
BASE EXCESS BLDA CALC-SCNC: 9 MMOL/L — HIGH (ref -2–3)
BASOPHILS # BLD AUTO: 0.03 K/UL — SIGNIFICANT CHANGE UP (ref 0–0.2)
BASOPHILS NFR BLD AUTO: 0.3 % — SIGNIFICANT CHANGE UP (ref 0–1)
BILIRUB SERPL-MCNC: 0.4 MG/DL — SIGNIFICANT CHANGE UP (ref 0.2–1.2)
BUN SERPL-MCNC: 21 MG/DL — HIGH (ref 10–20)
CALCIUM SERPL-MCNC: 10.1 MG/DL — SIGNIFICANT CHANGE UP (ref 8.4–10.5)
CHLORIDE SERPL-SCNC: 110 MMOL/L — SIGNIFICANT CHANGE UP (ref 98–110)
CO2 SERPL-SCNC: 31 MMOL/L — SIGNIFICANT CHANGE UP (ref 17–32)
CREAT SERPL-MCNC: 0.8 MG/DL — SIGNIFICANT CHANGE UP (ref 0.7–1.5)
EGFR: 79 ML/MIN/1.73M2 — SIGNIFICANT CHANGE UP
EOSINOPHIL # BLD AUTO: 0.56 K/UL — SIGNIFICANT CHANGE UP (ref 0–0.7)
EOSINOPHIL NFR BLD AUTO: 5 % — SIGNIFICANT CHANGE UP (ref 0–8)
GAS PNL BLDA: SIGNIFICANT CHANGE UP
GLUCOSE BLDC GLUCOMTR-MCNC: 115 MG/DL — HIGH (ref 70–99)
GLUCOSE SERPL-MCNC: 114 MG/DL — HIGH (ref 70–99)
HCO3 BLDA-SCNC: 33 MMOL/L — HIGH (ref 21–28)
HCT VFR BLD CALC: 25.4 % — LOW (ref 37–47)
HGB BLD-MCNC: 7.9 G/DL — LOW (ref 12–16)
HOROWITZ INDEX BLDA+IHG-RTO: 60 — SIGNIFICANT CHANGE UP
IMM GRANULOCYTES NFR BLD AUTO: 2.2 % — HIGH (ref 0.1–0.3)
LYMPHOCYTES # BLD AUTO: 1.58 K/UL — SIGNIFICANT CHANGE UP (ref 1.2–3.4)
LYMPHOCYTES # BLD AUTO: 14 % — LOW (ref 20.5–51.1)
MAGNESIUM SERPL-MCNC: 1.7 MG/DL — LOW (ref 1.8–2.4)
MCHC RBC-ENTMCNC: 29.5 PG — SIGNIFICANT CHANGE UP (ref 27–31)
MCHC RBC-ENTMCNC: 31.1 G/DL — LOW (ref 32–37)
MCV RBC AUTO: 94.8 FL — SIGNIFICANT CHANGE UP (ref 81–99)
MONOCYTES # BLD AUTO: 1.03 K/UL — HIGH (ref 0.1–0.6)
MONOCYTES NFR BLD AUTO: 9.1 % — SIGNIFICANT CHANGE UP (ref 1.7–9.3)
NEUTROPHILS # BLD AUTO: 7.84 K/UL — HIGH (ref 1.4–6.5)
NEUTROPHILS NFR BLD AUTO: 69.4 % — SIGNIFICANT CHANGE UP (ref 42.2–75.2)
NRBC # BLD: 0 /100 WBCS — SIGNIFICANT CHANGE UP (ref 0–0)
PCO2 BLDA: 40 MMHG — SIGNIFICANT CHANGE UP (ref 32–45)
PH BLDA: 7.52 — HIGH (ref 7.35–7.45)
PLATELET # BLD AUTO: 124 K/UL — LOW (ref 130–400)
PMV BLD: 12.4 FL — HIGH (ref 7.4–10.4)
PO2 BLDA: 184 MMHG — HIGH (ref 83–108)
POTASSIUM SERPL-MCNC: 3.7 MMOL/L — SIGNIFICANT CHANGE UP (ref 3.5–5)
POTASSIUM SERPL-SCNC: 3.7 MMOL/L — SIGNIFICANT CHANGE UP (ref 3.5–5)
PROT SERPL-MCNC: 4.6 G/DL — LOW (ref 6–8)
RBC # BLD: 2.68 M/UL — LOW (ref 4.2–5.4)
RBC # FLD: 13.4 % — SIGNIFICANT CHANGE UP (ref 11.5–14.5)
SAO2 % BLDA: 97.9 % — SIGNIFICANT CHANGE UP (ref 94–98)
SODIUM SERPL-SCNC: 149 MMOL/L — HIGH (ref 135–146)
UNFRACTIONATED HEPARIN INTERPRETATION: SIGNIFICANT CHANGE UP
UNFRACTIONATED HEPARIN RESULT: NEGATIVE — SIGNIFICANT CHANGE UP
UNFRACTIONATED HEPARIN-HIGH DOSE: 0 % — SIGNIFICANT CHANGE UP
UNFRACTIONATED HEPARIN-LOW DOSE: 0 % — SIGNIFICANT CHANGE UP
WBC # BLD: 11.29 K/UL — HIGH (ref 4.8–10.8)
WBC # FLD AUTO: 11.29 K/UL — HIGH (ref 4.8–10.8)

## 2024-11-11 PROCEDURE — 71045 X-RAY EXAM CHEST 1 VIEW: CPT | Mod: 26

## 2024-11-11 PROCEDURE — 74018 RADEX ABDOMEN 1 VIEW: CPT | Mod: 26

## 2024-11-11 PROCEDURE — 99233 SBSQ HOSP IP/OBS HIGH 50: CPT

## 2024-11-11 PROCEDURE — 99291 CRITICAL CARE FIRST HOUR: CPT

## 2024-11-11 RX ORDER — FUROSEMIDE 40 MG/1
40 TABLET ORAL ONCE
Refills: 0 | Status: COMPLETED | OUTPATIENT
Start: 2024-11-11 | End: 2024-11-11

## 2024-11-11 RX ADMIN — CHLORHEXIDINE GLUCONATE 1 APPLICATION(S): 1.2 RINSE ORAL at 05:05

## 2024-11-11 RX ADMIN — PANTOPRAZOLE SODIUM 40 MILLIGRAM(S): 40 TABLET, DELAYED RELEASE ORAL at 05:06

## 2024-11-11 RX ADMIN — MEROPENEM 100 MILLIGRAM(S): 500 INJECTION, POWDER, FOR SOLUTION INTRAVENOUS at 14:22

## 2024-11-11 RX ADMIN — PROPOFOL 2.75 MICROGRAM(S)/KG/MIN: 10 INJECTION, EMULSION INTRAVENOUS at 10:28

## 2024-11-11 RX ADMIN — ENOXAPARIN SODIUM 90 MILLIGRAM(S): 30 INJECTION SUBCUTANEOUS at 21:52

## 2024-11-11 RX ADMIN — MEROPENEM 100 MILLIGRAM(S): 500 INJECTION, POWDER, FOR SOLUTION INTRAVENOUS at 21:52

## 2024-11-11 RX ADMIN — PANTOPRAZOLE SODIUM 40 MILLIGRAM(S): 40 TABLET, DELAYED RELEASE ORAL at 18:18

## 2024-11-11 RX ADMIN — ENOXAPARIN SODIUM 90 MILLIGRAM(S): 30 INJECTION SUBCUTANEOUS at 09:22

## 2024-11-11 RX ADMIN — MEROPENEM 100 MILLIGRAM(S): 500 INJECTION, POWDER, FOR SOLUTION INTRAVENOUS at 05:05

## 2024-11-11 RX ADMIN — FUROSEMIDE 40 MILLIGRAM(S): 40 TABLET ORAL at 12:00

## 2024-11-11 RX ADMIN — CHLORHEXIDINE GLUCONATE 15 MILLILITER(S): 1.2 RINSE ORAL at 05:06

## 2024-11-11 RX ADMIN — AMLODIPINE BESYLATE 10 MILLIGRAM(S): 10 TABLET ORAL at 05:06

## 2024-11-11 RX ADMIN — LOSARTAN POTASSIUM 50 MILLIGRAM(S): 100 TABLET, FILM COATED ORAL at 05:06

## 2024-11-11 RX ADMIN — Medication 25 GRAM(S): at 06:43

## 2024-11-11 RX ADMIN — CHLORHEXIDINE GLUCONATE 15 MILLILITER(S): 1.2 RINSE ORAL at 18:18

## 2024-11-11 NOTE — PROGRESS NOTE ADULT - ASSESSMENT
73-year-old female PMH A-fib on xarelto,, HTN, s/p cholecystectomy, congenital solitary kidney presents from NH to the ED for evaluation of weakness, decreased PO intake and abdominal distension. Has not had a bowel movement in 3 days, pt unsure if she's been passing gas otherwise. She was noted to be hypotensive and have an elevated wbc count at the NH as well.  In the ED, BP 66/45 S/P LR started on  levo. Labs with wbc 19.29 CTAP with severe gastric distension, multiple foci of intraperitoneal free air and urinary bladder thickening with free air. Surgery consulted, no intervention for now, NGT placed for gastric decompression. GI was consulted for gastric distention and concern of ischemia on imaging.  s/p EGD. GI is following     # Gastric distention s/p decompression   # Pneumatosis and concern of gastric wall ischemia with free intraperitoneal air on admission: not appreciated on repeat CT  11/7: s/p EGD: Multiple clean based small ulcers were seen in the esophagus likely from NGT trauma. 10 cm Ulcer in the stomach body (Greater curvature).  Ulcers in the antrum and stomach body.	Erosions in the stomach compatible with erosive gastritis. Normal mucosa in the whole examined duodenum.  biopsies are negative for H. pylori and dysplasia     PLAN   - feed as tolerated    - PPI  BID    - recommend Repeat EGD in two months.  - Serial abd exams  - Correct electrolytes   - Can resume necessary AC as deemed indicated per team  - Abx per ID   - Surgery following   - Supportive care per primary team    # Abnormal CT:   - focal region of narrowing in the colon at the hepatic flexure with large lobulated soft tissue density in the right colon and cecum not typical appearance of stool. Neoplastic process cannot be excluded. Consider correlation with colonoscopy as clinically appropriate.  - no colonoscopy reports on file  - please recall once patient is hemodynamically stable and prior to discharge to evaluate for colonoscopy     # S/P PEG 2/2023 post intubation, removed in NH.   # s/p cholecystectomy, mildly dilated duct.   # Chronic elevation of alk phos

## 2024-11-11 NOTE — PROGRESS NOTE ADULT - ASSESSMENT
IMPRESSION:    Acute hypoxemic respiratory failure 40/10  Septic shock - resolved  UTI  Suspected colon mass  Severe gastric distension w/ pneumatosis along posterior gastric wall  Possible aspiration   Intraperitoneal free air  Thrombocytopenia   gastric ulcer   HAGMA  NOLA non oliguric resolved  Afib on Xarelto  HO HTN  HO ESBL Proteus    PLAN:    CNS: SAT daily. Pain control as needed. Avoid opiates     HEENT: Oral care. ET care     PULMONARY:  HOB @ 45 degrees.  Aspiration precautions. vent changes: change peep to 8.  Monitor airway pressures, SBT today    CARDIOVASCULAR: TTE. Goal directed fluid resuscitation.    GI: GI prophylaxis.  NGT with trickle feeds, Surgery following. GI eval noted: s/p EGD. f.u CT Angio abd and pelvis w/ PO, PPI BID    RENAL:  Follow up lytes. Correct as needed.  Strict intake and output.  Burger for is and Os.    INFECTIOUS DISEASE: Follow up cultures. Meropenem    HEMATOLOGICAL:  Resume AC. Monitor CBC and coags. HIT and DIC negative    ENDOCRINE:  Follow up FS. Insulin protocol if needed.    MUSCULOSKELETAL: Bed chair position.  Off loading    Full code    MICU  Jennyfer granda

## 2024-11-11 NOTE — PROGRESS NOTE ADULT - SUBJECTIVE AND OBJECTIVE BOX
Gastroenterology progress note:     Patient is a 73y old  Female who presents with a chief complaint of abd pain (11 Nov 2024 08:22)       Admitted on: 11-03-24    We are following the patient for: gastritis and gastric ulcers        Interval History:    No acute events overnight.        PAST MEDICAL & SURGICAL HISTORY:  HTN (hypertension)  Diabetes mellitus  Obesity  Gastroesophageal reflux disease  Active asthma  Generalized OA  Afib  H/O hernia repair 2011 and revised in 2013  History of cholecystectomy          MEDICATIONS  (STANDING):  amLODIPine   Tablet 10 milliGRAM(s) Oral daily  chlorhexidine 0.12% Liquid 15 milliLiter(s) Oral Mucosa every 12 hours  chlorhexidine 2% Cloths 1 Application(s) Topical <User Schedule>  enoxaparin Injectable 90 milliGRAM(s) SubCutaneous every 12 hours  fentaNYL   Infusion 0.5 MICROgram(s)/kG/Hr (4.58 mL/Hr) IV Continuous <Continuous>  losartan 50 milliGRAM(s) Oral daily  meropenem  IVPB 1000 milliGRAM(s) IV Intermittent every 8 hours  norepinephrine Infusion 0.05 MICROgram(s)/kG/Min (8.59 mL/Hr) IV Continuous <Continuous>  pantoprazole  Injectable 40 milliGRAM(s) IV Push two times a day  propofol Infusion 5.004 MICROgram(s)/kG/Min (2.75 mL/Hr) IV Continuous <Continuous>    MEDICATIONS  (PRN):  acetaminophen     Tablet .. 650 milliGRAM(s) Oral every 6 hours PRN Temp greater or equal to 38C (100.4F)  fentaNYL    Injectable 50 MICROGram(s) IV Push daily PRN sedation      Allergies  No Known Allergies      Review of Systems: limited by mental status     Physical Examination:  T(C): 36.8 (11-11-24 @ 08:00), Max: 37.9 (11-11-24 @ 00:00)  HR: 88 (11-11-24 @ 09:50) (74 - 104)  BP: 138/65 (11-11-24 @ 09:30) (100/49 - 173/70)  RR: 27 (11-11-24 @ 09:30) (21 - 33)  SpO2: 99% (11-11-24 @ 09:50) (94% - 100%)      11-10-24 @ 07:01  -  11-11-24 @ 07:00  --------------------------------------------------------  IN: 1565.7 mL / OUT: 3335 mL / NET: -1769.3 mL    11-11-24 @ 07:01  -  11-11-24 @ 10:07  --------------------------------------------------------  IN: 21.1 mL / OUT: 75 mL / NET: -53.9 mL      GENERAL: sedated   HEAD:  Atraumatic, Normocephalic  EYES: conjunctiva and sclera clear  NECK: Supple, no JVD or thyromegaly  CHEST/LUNG: Clear to auscultation bilaterally;  HEART: Regular rate and rhythm; normal S1, S2,  ABDOMEN: Soft, nontender, nondistended;   NEUROLOGY: sedated   SKIN: Intact, no jaundice     Data:                        7.9    11.29 )-----------( 124      ( 11 Nov 2024 04:21 )             25.4     Hgb trend:  7.9  11-11-24 @ 04:21  8.4  11-10-24 @ 04:24  7.7  11-09-24 @ 04:21        11-11    149[H]  |  110  |  21[H]  ----------------------------<  114[H]  3.7   |  31  |  0.8    Ca    10.1      11 Nov 2024 04:21  Mg     1.7     11-11    TPro  4.6[L]  /  Alb  2.5[L]  /  TBili  0.4  /  DBili  x   /  AST  10  /  ALT  11  /  AlkPhos  155[H]  11-11    Liver panel trend:  TBili 0.4   /   AST 10   /   ALT 11   /   AlkP 155   /   Tptn 4.6   /   Alb 2.5    /   DBili --      11-11  TBili 0.4   /   AST 12   /   ALT 13   /   AlkP 154   /   Tptn 4.3   /   Alb 2.4    /   DBili --      11-10  TBili 0.6   /   AST 19   /   ALT 20   /   AlkP 176   /   Tptn 4.1   /   Alb 2.4    /   DBili --      11-09  TBili 1.0   /   AST 48   /   ALT 23   /   AlkP 169   /   Tptn 4.2   /   Alb 2.4    /   DBili --      11-08  TBili 0.6   /   AST 8   /   ALT 16   /   AlkP 104   /   Tptn 4.6   /   Alb 2.6    /   DBili --      11-07  TBili 0.8   /   AST 8   /   ALT 18   /   AlkP 102   /   Tptn 4.6   /   Alb 2.6    /   DBili --      11-06  TBili 0.9   /   AST 11   /   ALT 23   /   AlkP 126   /   Tptn 4.8   /   Alb 2.8    /   DBili --      11-06  TBili 1.3   /   AST 14   /   ALT 33   /   AlkP 125   /   Tptn 4.7   /   Alb 2.9    /   DBili --      11-05  TBili 1.6   /   AST 39   /   ALT 55   /   AlkP 149   /   Tptn 5.3   /   Alb 3.2    /   DBili --      11-04  TBili 1.7   /   AST 43   /   ALT 51   /   AlkP 141   /   Tptn 5.5   /   Alb 3.4    /   DBili --      11-03  TBili 1.2   /   AST 18   /   ALT 33   /   AlkP 124   /   Tptn 6.1   /   Alb 3.7    /   DBili --      11-03          Culture - Sputum (collected 08 Nov 2024 12:36)  Source: Trach Asp Tracheal Aspirate  Gram Stain (09 Nov 2024 07:34):    Few polymorphonuclear leukocytes per low power field    No Squamous epithelial cells per low power field    Rare Gram positive cocci in pairs seen per oil power field    Rare Gram Negative Rods seen per oil power field  Final Report (10 Nov 2024 09:45):    No growth    < from: CT Angio Abdomen and Pelvis w/ Oral Cont and w/ IV Cont (11.08.24 @ 16:40) >  IMPRESSION:  In comparison to 11/3/2024:  1.  Interval placement of enteric tube with previously seen gastric   distention decompressed. Previously seen pneumatosis no longer visualized   on the current study. Previous small locules of free intraperitoneal air   no longer visualized. Previously seen trace pneumobilia is no longer   visualized.  2.  Bilateral lower lobe consolidations with trace right greater than   left underlying effusions. Atelectasis and/or pneumonia.  3.  There appears to be a focal region of narrowing in the colon at the   hepatic flexure with large lobulated soft tissue density in the right   colon and cecum not typical appearance of stool. Neoplastic process   cannot be excluded. Consider correlation with colonoscopy as clinically   appropriate.      < end of copied text >

## 2024-11-11 NOTE — PROGRESS NOTE ADULT - SUBJECTIVE AND OBJECTIVE BOX
Over Night Events: events noted, remains critically ill, still intubated, ventilated, fentanyl 0.5, propofol 30    PHYSICAL EXAM    ICU Vital Signs Last 24 Hrs  T(C): 36.8 (11 Nov 2024 08:00), Max: 37.9 (11 Nov 2024 00:00)  T(F): 98.2 (11 Nov 2024 08:00), Max: 100.2 (11 Nov 2024 00:00)  HR: 77 (11 Nov 2024 08:00) (72 - 104)  BP: 128/58 (11 Nov 2024 08:00) (100/49 - 179/74)  BP(mean): 83 (11 Nov 2024 08:00) (70 - 111)  RR: 25 (11 Nov 2024 08:00) (21 - 33)  SpO2: 100% (11 Nov 2024 08:00) (94% - 100%)    O2 Parameters below as of 11 Nov 2024 08:00  Patient On (Oxygen Delivery Method): ventilator    O2 Concentration (%): 40        General: ill looking  ETT  Lungs: Bilateral rhonchi  Cardiovascular: EARLENE 2.6  Abdomen: Soft, Positive BS  Extremities: No clubbing   Neurological: Non focal       11-10-24 @ 07:01  -  11-11-24 @ 07:00  --------------------------------------------------------  IN:    FentaNYL: 9.2 mL    FentaNYL: 76 mL    Glucerna: 1015 mL    IV PiggyBack: 100 mL    Propofol: 365.5 mL  Total IN: 1565.7 mL    OUT:    Indwelling Catheter - Urethral (mL): 2935 mL    Rectal Tube (mL): 400 mL  Total OUT: 3335 mL    Total NET: -1769.3 mL      11-11-24 @ 07:01  -  11-11-24 @ 08:23  --------------------------------------------------------  IN:    FentaNYL: 4.6 mL    Propofol: 16.5 mL  Total IN: 21.1 mL    OUT:    Indwelling Catheter - Urethral (mL): 75 mL  Total OUT: 75 mL    Total NET: -53.9 mL          LABS:                          7.9    11.29 )-----------( 124      ( 11 Nov 2024 04:21 )             25.4                                               11-11    149[H]  |  110  |  21[H]  ----------------------------<  114[H]  3.7   |  31  |  0.8    Ca    10.1      11 Nov 2024 04:21  Mg     1.7     11-11    TPro  4.6[L]  /  Alb  2.5[L]  /  TBili  0.4  /  DBili  x   /  AST  10  /  ALT  11  /  AlkPhos  155[H]  11-11                                             Urinalysis Basic - ( 11 Nov 2024 04:21 )    Color: x / Appearance: x / SG: x / pH: x  Gluc: 114 mg/dL / Ketone: x  / Bili: x / Urobili: x   Blood: x / Protein: x / Nitrite: x   Leuk Esterase: x / RBC: x / WBC x   Sq Epi: x / Non Sq Epi: x / Bacteria: x                                                  LIVER FUNCTIONS - ( 11 Nov 2024 04:21 )  Alb: 2.5 g/dL / Pro: 4.6 g/dL / ALK PHOS: 155 U/L / ALT: 11 U/L / AST: 10 U/L / GGT: x                                                  Culture - Sputum (collected 08 Nov 2024 12:36)  Source: Trach Asp Tracheal Aspirate  Gram Stain (09 Nov 2024 07:34):    Few polymorphonuclear leukocytes per low power field    No Squamous epithelial cells per low power field    Rare Gram positive cocci in pairs seen per oil power field    Rare Gram Negative Rods seen per oil power field  Final Report (10 Nov 2024 09:45):    No growth                                                   Mode: AC/ CMV (Assist Control/ Continuous Mandatory Ventilation)  RR (machine): 14  TV (machine): 380  FiO2: 40  PEEP: 10  ITime: 1  MAP: 15  PIP: 32                                      ABG - ( 11 Nov 2024 03:39 )  pH, Arterial: 7.52  pH, Blood: x     /  pCO2: 40    /  pO2: 184   / HCO3: 33    / Base Excess: 9.0   /  SaO2: 97.9                MEDICATIONS  (STANDING):  amLODIPine   Tablet 10 milliGRAM(s) Oral daily  chlorhexidine 0.12% Liquid 15 milliLiter(s) Oral Mucosa every 12 hours  chlorhexidine 2% Cloths 1 Application(s) Topical <User Schedule>  enoxaparin Injectable 90 milliGRAM(s) SubCutaneous every 12 hours  fentaNYL   Infusion 0.5 MICROgram(s)/kG/Hr (4.58 mL/Hr) IV Continuous <Continuous>  losartan 50 milliGRAM(s) Oral daily  meropenem  IVPB 1000 milliGRAM(s) IV Intermittent every 8 hours  norepinephrine Infusion 0.05 MICROgram(s)/kG/Min (8.59 mL/Hr) IV Continuous <Continuous>  pantoprazole  Injectable 40 milliGRAM(s) IV Push two times a day  propofol Infusion 5.004 MICROgram(s)/kG/Min (2.75 mL/Hr) IV Continuous <Continuous>    MEDICATIONS  (PRN):  acetaminophen     Tablet .. 650 milliGRAM(s) Oral every 6 hours PRN Temp greater or equal to 38C (100.4F)  fentaNYL    Injectable 50 MICROGram(s) IV Push daily PRN sedation      cxr noted

## 2024-11-11 NOTE — PROGRESS NOTE ADULT - ASSESSMENT
73-year-old female PMH A-fib on xarelto,, HTN, s/p cholecystectomy, congenital solitary kidney presents from NH to the ED for evaluation of weakness, decreased PO intake and abdominal distension.     Admitted to ICU for septic shock 2/2 ESBL proteus (UTI vs. GI source) complicated by gastric distension w/ pneumatosis, NOLA 2/2 ATN, hypernatremia. Intubated 11/6 for AHRF. s/p NGT decompression (-3.1 L feculent output), EGD 11/8 w/ multiple ulcers (largest 10cm) pending pathology. Now with new R colon/cecal mass.     #AHRF 2/2 septic shock due to ESBL proteus (UTI Vs. GI source)  #HAGMA 2/2 lactic acidosis and uremia - resolved   #NOLA likely ATN 2/2 septic shock - resolved   #Possible aspiration  #Congenital solitary kidney  - Intubated 11/6, failed SBT today 11/11  - off pressors   - sedation prop and fent  - UCx +ESBL proteus, BCX (-), trach culture with resp rosalva  - c/w freddie 1g q8h  - s/p IV lasix 40mg for congestion on AM CXR  - dc ya   - bronch today to assess secretions     #Abdominal distension w/ pneumatosis   #R colon/cecal mass on CT A/P  - NGT placed for decompression in ED with 3.1L feculent output   - GI recs, surgery recs appreciated   - s/p EGD 11/8 with 10 cm ulcer in body, ulcers in antrum/body, erosive gastritis   - CT A/P with PO contrast w/ suspicious hepatic flexure narrowing and lobulated soft tissue density in R colon/cecum.   - IV PPI BID   - NPO w/ TF   - colonoscopy when more stable     #Sacral DTI  - offloading, wound care, pressure injury prevention    #Afib  - hold xarelto iso NOLA  - c/w tx lovenox 1mg/kg q12h    #HTN  - c/w amlodipine 10mg qd  - c/w losartan 50mg qd     ---------------------  DVT ppx: tx lovenox   Diet: NPO w/ TF   GI ppx/Bowel regimen: PPI BID  Activity: bedrest  GOC: full code  Dispo: ICU  #Summary/Handoff:  - f/u GI recs

## 2024-11-11 NOTE — PROGRESS NOTE ADULT - SUBJECTIVE AND OBJECTIVE BOX
Patient is a 73y old Female who presents with a chief complaint of abd pain (11 Nov 2024 08:22)    Overnight events/Interval History:  - No acute overnight events  - At bedside, patient looks comfortable, opens eyes to painful stimulation     ROS:  - unable 2/2 mental status     Code Status: full code    ALLERGIES:  No Known Allergies    MEDICATIONS:  STANDING MEDICATIONS  amLODIPine   Tablet 10 milliGRAM(s) Oral daily  chlorhexidine 0.12% Liquid 15 milliLiter(s) Oral Mucosa every 12 hours  chlorhexidine 2% Cloths 1 Application(s) Topical <User Schedule>  enoxaparin Injectable 90 milliGRAM(s) SubCutaneous every 12 hours  fentaNYL   Infusion 0.5 MICROgram(s)/kG/Hr IV Continuous <Continuous>  losartan 50 milliGRAM(s) Oral daily  meropenem  IVPB 1000 milliGRAM(s) IV Intermittent every 8 hours  norepinephrine Infusion 0.05 MICROgram(s)/kG/Min IV Continuous <Continuous>  pantoprazole  Injectable 40 milliGRAM(s) IV Push two times a day  propofol Infusion 5.004 MICROgram(s)/kG/Min IV Continuous <Continuous>    PRN MEDICATIONS  acetaminophen     Tablet .. 650 milliGRAM(s) Oral every 6 hours PRN  fentaNYL    Injectable 50 MICROGram(s) IV Push daily PRN      VITALS LAST 24H:  Vital Signs Last 24 Hrs  T(C): 37.4 (11 Nov 2024 12:00), Max: 37.9 (11 Nov 2024 00:00)  T(F): 99.3 (11 Nov 2024 12:00), Max: 100.2 (11 Nov 2024 00:00)  HR: 88 (11 Nov 2024 12:15) (74 - 104)  BP: 129/60 (11 Nov 2024 12:15) (100/49 - 165/70)  BP(mean): 87 (11 Nov 2024 12:15) (70 - 111)  RR: 28 (11 Nov 2024 12:15) (21 - 37)  SpO2: 100% (11 Nov 2024 12:15) (94% - 100%)    Parameters below as of 11 Nov 2024 12:00  Patient On (Oxygen Delivery Method): ventilator    PHYSICAL EXAM:  GENERAL: NA  HEENT:  Moist mucous membranes  CHEST/LUNG: Clear to auscultation bilaterally; normal respiratory effort  HEART: Regular rate and rhythm  ABDOMEN: Soft, nontender, nondistended  EXTREMITIES:  No lower extremity edema bilaterally  NERVOUS SYSTEM:  Intubated, sedated     LABS:                        7.9    11.29 )-----------( 124      ( 11 Nov 2024 04:21 )             25.4     11-11    149[H]  |  110  |  21[H]  ----------------------------<  114[H]  3.7   |  31  |  0.8    Ca    10.1      11 Nov 2024 04:21  Mg     1.7     11-11    TPro  4.6[L]  /  Alb  2.5[L]  /  TBili  0.4  /  DBili  x   /  AST  10  /  ALT  11  /  AlkPhos  155[H]  11-11      Urinalysis Basic - ( 11 Nov 2024 04:21 )    Color: x / Appearance: x / SG: x / pH: x  Gluc: 114 mg/dL / Ketone: x  / Bili: x / Urobili: x   Blood: x / Protein: x / Nitrite: x   Leuk Esterase: x / RBC: x / WBC x   Sq Epi: x / Non Sq Epi: x / Bacteria: x      ABG - ( 11 Nov 2024 03:39 )  pH, Arterial: 7.52  pH, Blood: x     /  pCO2: 40    /  pO2: 184   / HCO3: 33    / Base Excess: 9.0   /  SaO2: 97.9                      RADIOLOGY:  CXR  Xray Chest 1 View- PORTABLE-Urgent:   ACC: [id number] EXAM:  XR CHEST PORTABLE URGENT 1V   ORDERED BY: CEDRICK CHEEK     PROCEDURE DATE:  11/09/2024          INTERPRETATION:  CLINICAL HISTORY: Enteric tube.    COMPARISON: Same day.    TECHNIQUE: Portable frontal chest radiograph. Adequate positioning.    FINDINGS:    Support devices: Stable enteric tube courses below the diaphragm. Stable   ET tube.    Cardiac/mediastinum/hilum: Unchanged    Lung parenchyma/Pleura: Right parenchymal opacities and bibasilar   effusions without difference.    Skeleton/soft tissues: Unchanged      IMPRESSION:    Bilateral opacities and effusions without difference.    --- End of Report ---            CASSIE HUTSON MD; Attending Radiologist  This document has been electronically signed. Nov 10 2024  8:38AM (11-09-24 @ 17:11)      CT

## 2024-11-12 LAB
ALBUMIN SERPL ELPH-MCNC: 2.5 G/DL — LOW (ref 3.5–5.2)
ALP SERPL-CCNC: 154 U/L — HIGH (ref 30–115)
ALT FLD-CCNC: 10 U/L — SIGNIFICANT CHANGE UP (ref 0–41)
ANION GAP SERPL CALC-SCNC: 9 MMOL/L — SIGNIFICANT CHANGE UP (ref 7–14)
ANION GAP SERPL CALC-SCNC: 9 MMOL/L — SIGNIFICANT CHANGE UP (ref 7–14)
APTT BLD: 44.3 SEC — HIGH (ref 27–39.2)
AST SERPL-CCNC: 10 U/L — SIGNIFICANT CHANGE UP (ref 0–41)
BASOPHILS # BLD AUTO: 0.04 K/UL — SIGNIFICANT CHANGE UP (ref 0–0.2)
BASOPHILS NFR BLD AUTO: 0.4 % — SIGNIFICANT CHANGE UP (ref 0–1)
BILIRUB SERPL-MCNC: 0.4 MG/DL — SIGNIFICANT CHANGE UP (ref 0.2–1.2)
BUN SERPL-MCNC: 19 MG/DL — SIGNIFICANT CHANGE UP (ref 10–20)
BUN SERPL-MCNC: 22 MG/DL — HIGH (ref 10–20)
CALCIUM SERPL-MCNC: 10 MG/DL — SIGNIFICANT CHANGE UP (ref 8.4–10.5)
CALCIUM SERPL-MCNC: 10.3 MG/DL — SIGNIFICANT CHANGE UP (ref 8.4–10.5)
CHLORIDE SERPL-SCNC: 108 MMOL/L — SIGNIFICANT CHANGE UP (ref 98–110)
CHLORIDE SERPL-SCNC: 109 MMOL/L — SIGNIFICANT CHANGE UP (ref 98–110)
CO2 SERPL-SCNC: 32 MMOL/L — SIGNIFICANT CHANGE UP (ref 17–32)
CO2 SERPL-SCNC: 34 MMOL/L — HIGH (ref 17–32)
CREAT SERPL-MCNC: 0.7 MG/DL — SIGNIFICANT CHANGE UP (ref 0.7–1.5)
CREAT SERPL-MCNC: 0.8 MG/DL — SIGNIFICANT CHANGE UP (ref 0.7–1.5)
EGFR: 79 ML/MIN/1.73M2 — SIGNIFICANT CHANGE UP
EGFR: 92 ML/MIN/1.73M2 — SIGNIFICANT CHANGE UP
EOSINOPHIL # BLD AUTO: 0.51 K/UL — SIGNIFICANT CHANGE UP (ref 0–0.7)
EOSINOPHIL NFR BLD AUTO: 5.5 % — SIGNIFICANT CHANGE UP (ref 0–8)
GAS PNL BLDA: SIGNIFICANT CHANGE UP
GLUCOSE BLDC GLUCOMTR-MCNC: 122 MG/DL — HIGH (ref 70–99)
GLUCOSE SERPL-MCNC: 116 MG/DL — HIGH (ref 70–99)
GLUCOSE SERPL-MCNC: 122 MG/DL — HIGH (ref 70–99)
GRAM STN FLD: SIGNIFICANT CHANGE UP
HCT VFR BLD CALC: 25.7 % — LOW (ref 37–47)
HGB BLD-MCNC: 7.9 G/DL — LOW (ref 12–16)
IMM GRANULOCYTES NFR BLD AUTO: 1.5 % — HIGH (ref 0.1–0.3)
INR BLD: 0.96 RATIO — SIGNIFICANT CHANGE UP (ref 0.65–1.3)
LYMPHOCYTES # BLD AUTO: 1.59 K/UL — SIGNIFICANT CHANGE UP (ref 1.2–3.4)
LYMPHOCYTES # BLD AUTO: 17.3 % — LOW (ref 20.5–51.1)
MAGNESIUM SERPL-MCNC: 1.9 MG/DL — SIGNIFICANT CHANGE UP (ref 1.8–2.4)
MCHC RBC-ENTMCNC: 29.6 PG — SIGNIFICANT CHANGE UP (ref 27–31)
MCHC RBC-ENTMCNC: 30.7 G/DL — LOW (ref 32–37)
MCV RBC AUTO: 96.3 FL — SIGNIFICANT CHANGE UP (ref 81–99)
MONOCYTES # BLD AUTO: 0.82 K/UL — HIGH (ref 0.1–0.6)
MONOCYTES NFR BLD AUTO: 8.9 % — SIGNIFICANT CHANGE UP (ref 1.7–9.3)
NEUTROPHILS # BLD AUTO: 6.11 K/UL — SIGNIFICANT CHANGE UP (ref 1.4–6.5)
NEUTROPHILS NFR BLD AUTO: 66.4 % — SIGNIFICANT CHANGE UP (ref 42.2–75.2)
NIGHT BLUE STAIN TISS: SIGNIFICANT CHANGE UP
NRBC # BLD: 0 /100 WBCS — SIGNIFICANT CHANGE UP (ref 0–0)
PLATELET # BLD AUTO: 179 K/UL — SIGNIFICANT CHANGE UP (ref 130–400)
PMV BLD: 11.8 FL — HIGH (ref 7.4–10.4)
POTASSIUM SERPL-MCNC: 3.5 MMOL/L — SIGNIFICANT CHANGE UP (ref 3.5–5)
POTASSIUM SERPL-MCNC: 3.9 MMOL/L — SIGNIFICANT CHANGE UP (ref 3.5–5)
POTASSIUM SERPL-SCNC: 3.5 MMOL/L — SIGNIFICANT CHANGE UP (ref 3.5–5)
POTASSIUM SERPL-SCNC: 3.9 MMOL/L — SIGNIFICANT CHANGE UP (ref 3.5–5)
PROT SERPL-MCNC: 4.7 G/DL — LOW (ref 6–8)
PROTHROM AB SERPL-ACNC: 11.3 SEC — SIGNIFICANT CHANGE UP (ref 9.95–12.87)
RBC # BLD: 2.67 M/UL — LOW (ref 4.2–5.4)
RBC # FLD: 13.3 % — SIGNIFICANT CHANGE UP (ref 11.5–14.5)
SODIUM SERPL-SCNC: 150 MMOL/L — HIGH (ref 135–146)
SODIUM SERPL-SCNC: 151 MMOL/L — HIGH (ref 135–146)
SPECIMEN SOURCE: SIGNIFICANT CHANGE UP
SPECIMEN SOURCE: SIGNIFICANT CHANGE UP
TRIGL SERPL-MCNC: 208 MG/DL — HIGH
WBC # BLD: 9.21 K/UL — SIGNIFICANT CHANGE UP (ref 4.8–10.8)
WBC # FLD AUTO: 9.21 K/UL — SIGNIFICANT CHANGE UP (ref 4.8–10.8)

## 2024-11-12 PROCEDURE — 71045 X-RAY EXAM CHEST 1 VIEW: CPT | Mod: 26,76

## 2024-11-12 PROCEDURE — 71045 X-RAY EXAM CHEST 1 VIEW: CPT | Mod: 26,77

## 2024-11-12 PROCEDURE — 99291 CRITICAL CARE FIRST HOUR: CPT | Mod: 25

## 2024-11-12 PROCEDURE — 31624 DX BRONCHOSCOPE/LAVAGE: CPT

## 2024-11-12 RX ORDER — CHLORHEXIDINE GLUCONATE 1.2 MG/ML
15 RINSE ORAL EVERY 12 HOURS
Refills: 0 | Status: DISCONTINUED | OUTPATIENT
Start: 2024-11-12 | End: 2024-11-13

## 2024-11-12 RX ORDER — 0.9 % SODIUM CHLORIDE 0.9 %
1000 INTRAVENOUS SOLUTION INTRAVENOUS
Refills: 0 | Status: DISCONTINUED | OUTPATIENT
Start: 2024-11-12 | End: 2024-11-12

## 2024-11-12 RX ORDER — NYSTATIN 100000 [USP'U]/G
1 POWDER TOPICAL
Refills: 0 | Status: DISCONTINUED | OUTPATIENT
Start: 2024-11-12 | End: 2024-12-09

## 2024-11-12 RX ORDER — FUROSEMIDE 40 MG/1
60 TABLET ORAL DAILY
Refills: 0 | Status: DISCONTINUED | OUTPATIENT
Start: 2024-11-12 | End: 2024-11-12

## 2024-11-12 RX ORDER — FENTANYL 12 UG/H
50 PATCH, EXTENDED RELEASE TRANSDERMAL ONCE
Refills: 0 | Status: DISCONTINUED | OUTPATIENT
Start: 2024-11-12 | End: 2024-11-12

## 2024-11-12 RX ORDER — DEXMEDETOMIDINE HYDROCHLORIDE 4 UG/ML
0.05 INJECTION, SOLUTION INTRAVENOUS
Qty: 400 | Refills: 0 | Status: DISCONTINUED | OUTPATIENT
Start: 2024-11-12 | End: 2024-11-12

## 2024-11-12 RX ORDER — DEXMEDETOMIDINE HYDROCHLORIDE 4 UG/ML
0.5 INJECTION, SOLUTION INTRAVENOUS
Qty: 400 | Refills: 0 | Status: DISCONTINUED | OUTPATIENT
Start: 2024-11-12 | End: 2024-11-15

## 2024-11-12 RX ORDER — FUROSEMIDE 40 MG/1
60 TABLET ORAL ONCE
Refills: 0 | Status: COMPLETED | OUTPATIENT
Start: 2024-11-12 | End: 2024-11-12

## 2024-11-12 RX ADMIN — AMLODIPINE BESYLATE 10 MILLIGRAM(S): 10 TABLET ORAL at 06:27

## 2024-11-12 RX ADMIN — MEROPENEM 100 MILLIGRAM(S): 500 INJECTION, POWDER, FOR SOLUTION INTRAVENOUS at 06:26

## 2024-11-12 RX ADMIN — PANTOPRAZOLE SODIUM 40 MILLIGRAM(S): 40 TABLET, DELAYED RELEASE ORAL at 18:02

## 2024-11-12 RX ADMIN — MEROPENEM 100 MILLIGRAM(S): 500 INJECTION, POWDER, FOR SOLUTION INTRAVENOUS at 14:47

## 2024-11-12 RX ADMIN — ENOXAPARIN SODIUM 90 MILLIGRAM(S): 30 INJECTION SUBCUTANEOUS at 21:07

## 2024-11-12 RX ADMIN — CHLORHEXIDINE GLUCONATE 15 MILLILITER(S): 1.2 RINSE ORAL at 18:02

## 2024-11-12 RX ADMIN — PANTOPRAZOLE SODIUM 40 MILLIGRAM(S): 40 TABLET, DELAYED RELEASE ORAL at 06:27

## 2024-11-12 RX ADMIN — CHLORHEXIDINE GLUCONATE 1 APPLICATION(S): 1.2 RINSE ORAL at 06:25

## 2024-11-12 RX ADMIN — MEROPENEM 100 MILLIGRAM(S): 500 INJECTION, POWDER, FOR SOLUTION INTRAVENOUS at 21:06

## 2024-11-12 RX ADMIN — ENOXAPARIN SODIUM 90 MILLIGRAM(S): 30 INJECTION SUBCUTANEOUS at 10:09

## 2024-11-12 RX ADMIN — CHLORHEXIDINE GLUCONATE 15 MILLILITER(S): 1.2 RINSE ORAL at 06:27

## 2024-11-12 RX ADMIN — DEXMEDETOMIDINE HYDROCHLORIDE 11.5 MICROGRAM(S)/KG/HR: 4 INJECTION, SOLUTION INTRAVENOUS at 10:14

## 2024-11-12 RX ADMIN — FUROSEMIDE 60 MILLIGRAM(S): 40 TABLET ORAL at 14:47

## 2024-11-12 RX ADMIN — LOSARTAN POTASSIUM 50 MILLIGRAM(S): 100 TABLET, FILM COATED ORAL at 06:27

## 2024-11-12 RX ADMIN — FENTANYL 50 MICROGRAM(S): 12 PATCH, EXTENDED RELEASE TRANSDERMAL at 21:06

## 2024-11-12 NOTE — PROGRESS NOTE ADULT - ASSESSMENT
IMPRESSION:    Acute hypoxemic respiratory failure 40/10  Septic shock - resolved  UTI  Suspected colon mass  Severe gastric distension w/ pneumatosis along posterior gastric wall  Possible aspiration   Intraperitoneal free air  Thrombocytopenia   gastric ulcer   HAGMA  NOLA non oliguric resolved  Afib on Xarelto  HO HTN  HO ESBL Proteus    PLAN:    CNS: SAT daily. Pain control as needed.     HEENT: Oral care. ET care     PULMONARY:  HOB @ 45 degrees.  Aspiration precautions. vent changes: change peep to 8.  Monitor airway pressures, SBT today [facility]    CARDIOVASCULAR: TTE. Mod AS    GI: GI prophylaxis.  NPO  Surgery following. GI eval noted: s/p EGD. f.u CT Angio abd and pelvis w/ PO, PPI BID    RENAL:  Follow up lytes. Correct as needed.  Strict intake and output.  Burger for is and Os.    INFECTIOUS DISEASE: Follow up cultures. Meropenem    HEMATOLOGICAL:  Resume AC. Monitor CBC and coags. HIT and DIC negative    ENDOCRINE:  Follow up FS. Insulin protocol if needed.    MUSCULOSKELETAL: Bed chair position.  Off loading    Full code    MICU  Jennyfer granda

## 2024-11-12 NOTE — CHART NOTE - NSCHARTNOTEFT_GEN_A_CORE
Registered Dietitian Follow-Up     Patient Profile Reviewed                           Yes [x]   No []     Nutrition History Previously Obtained        Yes [x]  No []       Pertinent Subjective Information: Pt tolerating TF regimen per staff.      Pertinent Medical Information: 73-year-old female PMH A-fib on xarelto,, HTN, s/p cholecystectomy, congenital solitary kidney presents from NH to the ED for evaluation of weakness, decreased PO intake and abdominal distension.     Per MD note on : Admitted to ICU for septic shock 2/2 ESBL proteus (UTI vs. GI source) complicated by gastric distension w/ pneumatosis, NOLA 2/2 ATN, hypernatremia. s/p NGT decompression (-3.1 L feculent output) in ED. Intubated  for AHRF. EGD  w/ multipl ulcers, largest 10cm) pending pathology. Now with new R colon/cecal mass pending colonoscopy when more stable.   #AHRF 2/2 septic shock due to ESBL proteus (UTI Vs. GI source) - improving  #HAGMA 2/2 lactic acidosis and uremia - resolved   #NOLA likely ATN 2/2 septic shock - resolved   #Possible aspiration  #Congenital solitary kidney  - Intubated , failed SBT today   - off pressors, precedex for sedation   - bronch today, SBT/SAT      Diet order: Diet, NPO with Tube Feed:   Tube Feeding Modality: Orogastric  Glucerna 1.2 Jeff (GLUCERNARTH)  Total Volume for 24 Hours (mL): 990  Continuous  Starting Tube Feed Rate {mL per Hour}: 20  Increase Tube Feed Rate by (mL): 20  Until Goal Tube Feed Rate (mL per Hour): 55  Tube Feed Duration (in Hours): 18  Tube Feed Start Time: 12:00  Tube Feed Stop Time: 06:00  Free Water Flush  Bolus   Total Volume per Flush (mL): 250   Frequency: Every 6 Hours (- @ 08:14) [Active]    Anthropometrics:  Weight: 91.6 KG   Height: 167.6 cm  BMI: 32.6  IBW: 59 KG    Daily Weight in k (), Weight in k.6 (), Weight in k.3 (11-10), Weight in k.6 (), Weight in k.1 (), Weight in k.4 (), Weight in k.5 () -- edema 2+ noted per flow sheet     MEDICATIONS  (STANDING):  amLODIPine   Tablet 10 milliGRAM(s) Oral daily  chlorhexidine 0.12% Liquid 15 milliLiter(s) Oral Mucosa every 12 hours  chlorhexidine 2% Cloths 1 Application(s) Topical <User Schedule>  dexMEDEtomidine Infusion 0.5 MICROgram(s)/kG/Hr (11.5 mL/Hr) IV Continuous <Continuous>  enoxaparin Injectable 90 milliGRAM(s) SubCutaneous every 12 hours  losartan 50 milliGRAM(s) Oral daily  meropenem  IVPB 1000 milliGRAM(s) IV Intermittent every 8 hours  pantoprazole  Injectable 40 milliGRAM(s) IV Push two times a day    Pertinent Labs:  @ 04:20: Na 150[H], BUN 22[H], Cr 0.7, [H], K+ 3.5, Phos --, Mg 1.9, Alk Phos 154[H], ALT/SGPT 10, AST/SGOT 10, HbA1c --    Finger Sticks:  POCT Blood Glucose.: 115 mg/dL ( @ 22:26)    Physical Findings:  - Appearance: sedated/endotracheal tube  - GI function: last BM on   - Tubes: OGT  - Oral/Mouth cavity: NPO w/ OGT  - Skin: SDTI to bilateral buttocks  - Edema: generalized edema 2+     Nutrition Requirements:  Weight Used: 91.6 KG Using ABW -- with consideration for age, BMI, intubated not on propofol, Ve 7.2, Tmax 37.2 C    Estimated Energy Needs    Continue [x]  Adjust []  ENERGY: PSE () 1565.53 kcal/day vs. 0137-4912 kcal/day (11-14 kcal/kg     Estimated Protein Needs    Continue [x]  Adjust []  PROTEIN:  g/day (1.5-2 g/kg IBW 59 KG)     Estimated Fluid Needs        Continue [x]  Adjust []  FLUID: 1019-3108 mL/day (25-30 mL/kg)     Nutrient Intake: Current TF regimen provides -- 1188 kcal, 59.4g pro, 797 mL free water from formula      [] Previous Nutrition Diagnosis: Inadequate Protein Energy Intake              [] Ongoing          [] Resolved    [] No active nutrition diagnosis identified at this time     Nutrition Education: Deferred at this time     Goal/Expected Outcome: Pt to meet >85 - 105% of estimated needs within 3-5 days     Indicator/Monitoring: Diet order, weights, labs, NFPF, body composition, BM and tolerance to TF regimen    Recommendation:  1. Recommend to adjust TF regimen to meet estimated needs: Jevity 1.2 via OGT, continuous 18hr feeds, total volume: 1170 mL, start rate at 25 mL/hr and increase as tolerated until goal rate of 65 mL/hr is met. ADD no carb prosource 2X/DAILY -- provides 120 kcal, 30g pro total. TF regimen + no carb prosource to provide -- 1524 kcal, 94.9g pro, 944 mL free water from formula + additional water flushes of 50 mL q4hrs -- team to adjust free water flushes prn.     High risk f/u    RD to remain available: Jennifer Rios [phone] or TEAMS

## 2024-11-12 NOTE — PROGRESS NOTE ADULT - SUBJECTIVE AND OBJECTIVE BOX
Over Night Events: events noted, remains critically ill, still intubated, was on fentanyl, propofol, sailed SBT    PHYSICAL EXAM    ICU Vital Signs Last 24 Hrs  T(C): 36.7 (12 Nov 2024 04:00), Max: 37.5 (11 Nov 2024 16:00)  T(F): 98.1 (12 Nov 2024 04:00), Max: 99.5 (11 Nov 2024 16:00)  HR: 81 (12 Nov 2024 07:40) (70 - 88)  BP: 125/60 (12 Nov 2024 07:00) (100/51 - 165/70)  BP(mean): 87 (12 Nov 2024 07:00) (73 - 101)  RR: 22 (12 Nov 2024 07:00) (22 - 37)  SpO2: 100% (12 Nov 2024 07:40) (95% - 100%)    O2 Parameters below as of 12 Nov 2024 07:00  Patient On (Oxygen Delivery Method): ventilator    O2 Concentration (%): 40        General: ILL looking  ETT  Lungs: Bilateral Rhonchi  Cardiovascular: EARLENE 2.6  Abdomen: Soft, Positive BS  Extremities: No clubbing   Follows commands      11-11-24 @ 07:01  -  11-12-24 @ 07:00  --------------------------------------------------------  IN:    Enteral Tube Flush: 130 mL    FentaNYL: 51 mL    Glucerna: 1100 mL    IV PiggyBack: 100 mL    Propofol: 191.7 mL  Total IN: 1572.7 mL    OUT:    Indwelling Catheter - Urethral (mL): 200 mL    Rectal Tube (mL): 200 mL    Voided (mL): 1350 mL  Total OUT: 1750 mL    Total NET: -177.3 mL          LABS:                          7.9    9.21  )-----------( 179      ( 12 Nov 2024 04:20 )             25.7                                               11-12    150[H]  |  109  |  22[H]  ----------------------------<  122[H]  3.5   |  32  |  0.7    Ca    10.0      12 Nov 2024 04:20  Mg     1.9     11-12    TPro  4.7[L]  /  Alb  2.5[L]  /  TBili  0.4  /  DBili  x   /  AST  10  /  ALT  10  /  AlkPhos  154[H]  11-12      PT/INR - ( 12 Nov 2024 04:20 )   PT: 11.30 sec;   INR: 0.96 ratio         PTT - ( 12 Nov 2024 04:20 )  PTT:44.3 sec                                       Urinalysis Basic - ( 12 Nov 2024 04:20 )    Color: x / Appearance: x / SG: x / pH: x  Gluc: 122 mg/dL / Ketone: x  / Bili: x / Urobili: x   Blood: x / Protein: x / Nitrite: x   Leuk Esterase: x / RBC: x / WBC x   Sq Epi: x / Non Sq Epi: x / Bacteria: x                                                  LIVER FUNCTIONS - ( 12 Nov 2024 04:20 )  Alb: 2.5 g/dL / Pro: 4.7 g/dL / ALK PHOS: 154 U/L / ALT: 10 U/L / AST: 10 U/L / GGT: x                                                                                               Mode: AC/ CMV (Assist Control/ Continuous Mandatory Ventilation)  RR (machine): 14  TV (machine): 380  FiO2: 40  PEEP: 10  ITime: 0.7  MAP: 17  PIP: 40                                      ABG - ( 12 Nov 2024 04:13 )  pH, Arterial: 7.51  pH, Blood: x     /  pCO2: 41    /  pO2: 141   / HCO3: 33    / Base Excess: 8.9   /  SaO2: 96.3                MEDICATIONS  (STANDING):  amLODIPine   Tablet 10 milliGRAM(s) Oral daily  chlorhexidine 0.12% Liquid 15 milliLiter(s) Oral Mucosa every 12 hours  chlorhexidine 2% Cloths 1 Application(s) Topical <User Schedule>  enoxaparin Injectable 90 milliGRAM(s) SubCutaneous every 12 hours  fentaNYL   Infusion 0.5 MICROgram(s)/kG/Hr (4.58 mL/Hr) IV Continuous <Continuous>  losartan 50 milliGRAM(s) Oral daily  meropenem  IVPB 1000 milliGRAM(s) IV Intermittent every 8 hours  pantoprazole  Injectable 40 milliGRAM(s) IV Push two times a day  propofol Infusion 5.004 MICROgram(s)/kG/Min (2.75 mL/Hr) IV Continuous <Continuous>    MEDICATIONS  (PRN):  acetaminophen     Tablet .. 650 milliGRAM(s) Oral every 6 hours PRN Temp greater or equal to 38C (100.4F)  fentaNYL    Injectable 50 MICROGram(s) IV Push daily PRN sedation      cxr NOTED     Over Night Events: events noted, remains critically ill, still intubated, was on fentanyl, propofol, failed SBT    PHYSICAL EXAM    ICU Vital Signs Last 24 Hrs  T(C): 36.7 (12 Nov 2024 04:00), Max: 37.5 (11 Nov 2024 16:00)  T(F): 98.1 (12 Nov 2024 04:00), Max: 99.5 (11 Nov 2024 16:00)  HR: 81 (12 Nov 2024 07:40) (70 - 88)  BP: 125/60 (12 Nov 2024 07:00) (100/51 - 165/70)  BP(mean): 87 (12 Nov 2024 07:00) (73 - 101)  RR: 22 (12 Nov 2024 07:00) (22 - 37)  SpO2: 100% (12 Nov 2024 07:40) (95% - 100%)    O2 Parameters below as of 12 Nov 2024 07:00  Patient On (Oxygen Delivery Method): ventilator    O2 Concentration (%): 40        General: ILL looking  ETT  Lungs: Bilateral Rhonchi  Cardiovascular: EARLENE 2.6  Abdomen: Soft, Positive BS  Extremities: No clubbing   Follows commands      11-11-24 @ 07:01  -  11-12-24 @ 07:00  --------------------------------------------------------  IN:    Enteral Tube Flush: 130 mL    FentaNYL: 51 mL    Glucerna: 1100 mL    IV PiggyBack: 100 mL    Propofol: 191.7 mL  Total IN: 1572.7 mL    OUT:    Indwelling Catheter - Urethral (mL): 200 mL    Rectal Tube (mL): 200 mL    Voided (mL): 1350 mL  Total OUT: 1750 mL    Total NET: -177.3 mL          LABS:                          7.9    9.21  )-----------( 179      ( 12 Nov 2024 04:20 )             25.7                                               11-12    150[H]  |  109  |  22[H]  ----------------------------<  122[H]  3.5   |  32  |  0.7    Ca    10.0      12 Nov 2024 04:20  Mg     1.9     11-12    TPro  4.7[L]  /  Alb  2.5[L]  /  TBili  0.4  /  DBili  x   /  AST  10  /  ALT  10  /  AlkPhos  154[H]  11-12      PT/INR - ( 12 Nov 2024 04:20 )   PT: 11.30 sec;   INR: 0.96 ratio         PTT - ( 12 Nov 2024 04:20 )  PTT:44.3 sec                                       Urinalysis Basic - ( 12 Nov 2024 04:20 )    Color: x / Appearance: x / SG: x / pH: x  Gluc: 122 mg/dL / Ketone: x  / Bili: x / Urobili: x   Blood: x / Protein: x / Nitrite: x   Leuk Esterase: x / RBC: x / WBC x   Sq Epi: x / Non Sq Epi: x / Bacteria: x                                                  LIVER FUNCTIONS - ( 12 Nov 2024 04:20 )  Alb: 2.5 g/dL / Pro: 4.7 g/dL / ALK PHOS: 154 U/L / ALT: 10 U/L / AST: 10 U/L / GGT: x                                                                                               Mode: AC/ CMV (Assist Control/ Continuous Mandatory Ventilation)  RR (machine): 14  TV (machine): 380  FiO2: 40  PEEP: 10  ITime: 0.7  MAP: 17  PIP: 40                                      ABG - ( 12 Nov 2024 04:13 )  pH, Arterial: 7.51  pH, Blood: x     /  pCO2: 41    /  pO2: 141   / HCO3: 33    / Base Excess: 8.9   /  SaO2: 96.3                MEDICATIONS  (STANDING):  amLODIPine   Tablet 10 milliGRAM(s) Oral daily  chlorhexidine 0.12% Liquid 15 milliLiter(s) Oral Mucosa every 12 hours  chlorhexidine 2% Cloths 1 Application(s) Topical <User Schedule>  enoxaparin Injectable 90 milliGRAM(s) SubCutaneous every 12 hours  fentaNYL   Infusion 0.5 MICROgram(s)/kG/Hr (4.58 mL/Hr) IV Continuous <Continuous>  losartan 50 milliGRAM(s) Oral daily  meropenem  IVPB 1000 milliGRAM(s) IV Intermittent every 8 hours  pantoprazole  Injectable 40 milliGRAM(s) IV Push two times a day  propofol Infusion 5.004 MICROgram(s)/kG/Min (2.75 mL/Hr) IV Continuous <Continuous>    MEDICATIONS  (PRN):  acetaminophen     Tablet .. 650 milliGRAM(s) Oral every 6 hours PRN Temp greater or equal to 38C (100.4F)  fentaNYL    Injectable 50 MICROGram(s) IV Push daily PRN sedation      cxr NOTED

## 2024-11-12 NOTE — PROGRESS NOTE ADULT - ASSESSMENT
73-year-old female PMH A-fib on xarelto,, HTN, s/p cholecystectomy, congenital solitary kidney presents from NH to the ED for evaluation of weakness, decreased PO intake and abdominal distension.     Admitted to ICU for septic shock 2/2 ESBL proteus (UTI vs. GI source) complicated by gastric distension w/ pneumatosis, NOLA 2/2 ATN, hypernatremia. s/p NGT decompression (-3.1 L feculent output) in ED. Intubated 11/6 for AHRF. EGD 11/8 w/ multiple ulcers,  larges largest  (,rgest 10cm) pending pathology. Now with new R colon/cecal mass pending colonoscopy when more stable.     #AHRF 2/2 septic shock due to ESBL proteus (UTI Vs. GI source)  #HAGMA 2/2 lactic acidosis and uremia - resolved   #NOLA likely ATN 2/2 septic shock - resolved   #Possible aspiration  #Congenital solitary kidney  - Intubated 11/6, failed SBT today 11/11  - off pressors   - sedation prop and fent  - UCx +ESBL proteus, BCX (-), trach culture with resp rosalva  - c/w freddie 1g q8h  - s/p IV lasix 40mg for congestion on AM CXR  - dc ya   - bronch today to assess secretions     #Abdominal distension w/ pneumatosis   #R colon/cecal mass on CT A/P  - NGT placed for decompression in ED with 3.1L feculent output   - GI recs, surgery recs appreciated   - s/p EGD 11/8 with 10 cm ulcer in body, ulcers in antrum/body, erosive gastritis   - CT A/P with PO contrast w/ suspicious hepatic flexure narrowing and lobulated soft tissue density in R colon/cecum.   - IV PPI BID   - NPO w/ TF   - colonoscopy when more stable     #Sacral DTI  - offloading, wound care, pressure injury prevention    #Afib  - hold xarelto iso NOLA  - c/w tx lovenox 1mg/kg q12h    #HTN  - c/w amlodipine 10mg qd  - c/w losartan 50mg qd     ---------------------  DVT ppx: tx lovenox   Diet: NPO w/ TF   GI ppx/Bowel regimen: PPI BID  Activity: bedrest  GOC: full code  Dispo: ICU  #Summary/Handoff:  - f/u GI recs   73-year-old female PMH A-fib on xarelto,, HTN, s/p cholecystectomy, congenital solitary kidney presents from NH to the ED for evaluation of weakness, decreased PO intake and abdominal distension.     Admitted to ICU for septic shock 2/2 ESBL proteus (UTI vs. GI source) complicated by gastric distension w/ pneumatosis, NOLA 2/2 ATN, hypernatremia. s/p NGT decompression (-3.1 L feculent output) in ED. Intubated 11/6 for AHRF. EGD 11/8 w/ multipl ulcers, largest 10cm) pending pathology. Now with new R colon/cecal mass pending colonoscopy when more stable.     #AHRF 2/2 septic shock due to ESBL proteus (UTI Vs. GI source) - improving  #HAGMA 2/2 lactic acidosis and uremia - resolved   #NOLA likely ATN 2/2 septic shock - resolved   #Possible aspiration  #Congenital solitary kidney  - Intubated 11/6, failed SBT today 11/11  - off pressors, precedex for sedation   - UCx +ESBL proteus, BCX (-), trach culture with resp rosalva  - c/w freddie 1g q8h  - s/p IV lasix 40mg for congestion on AM CXR  - dc ya  - bronch today, SBT/SAT     #Hypernatremia  - [id number] FW flushes with TF     #Abdominal distension w/ pneumatosis   #R colon/cecal mass on CT A/P  - NGT placed for decompression in ED with 3.1L feculent output   - GI recs, surgery recs, vascular recs appreciated   - s/p EGD 11/8 with 10 cm ulcer in body, ulcers in antrum/body, erosive gastritis   - CT A/P with PO contrast w/ suspicious hepatic flexure narrowing and lobulated soft tissue density in R colon/cecum.   - IV PPI BID   - NPO w/ TF   - colonoscopy when more stable as per GI    #Sacral DTI  - offloading, wound care, pressure injury prevention    #Afib  - hold xarelto iso NOLA  - c/w tx lovenox 1mg/kg q12h    #HTN  - c/w amlodipine 10mg qd  - c/w losartan 50mg qd     ---------------------  DVT ppx: tx lovenox   Diet: NPO w/ TF   GI ppx/Bowel regimen: PPI BID  Activity: bedrest  GOC: full code  Dispo: ICU  #Summary/Handoff:  - SBT, bronch, FW for hypernatremia

## 2024-11-12 NOTE — PROCEDURE NOTE - NSBRONCHPROCDETAILS_GEN_A_CORE_FT
PROCEDURE PERFORMED:  Bronchoscopy, and washing.    CONSENT: After explanining the risk and benefit the consent was obtained from the patient's daughter    The patient had been previously intubated and was on mechanical ventilatory support. She was on sedation, which was continued and adjusted during the procedure. Her FiO2 was increased to 100% during the procedure. The fiberoptic bronchoscope was introduced through an endotracheal tube adaptor and the tip of the endotracheal tube was noted to be in good position above the tor.   The Right tracheobronchial tree was inspected closely to the level of the subsegmental bronchi. All bronchi are patent with no endobronchial lesions and no mucosal lesions noted.   The Left tracheobronchial tree was inspected and thick gelatinous secretions were suctioned out.  All bronchi were patent and intact with the mucosa normal.    After adequate clearing of secretions was accomplished, the bronchoscope was removed from the patient and the procedure was terminated.   The procedure was completed and all samples were submitted for appropriate studies    The patient tolerated the procedure well and there were no complications.

## 2024-11-12 NOTE — PROGRESS NOTE ADULT - SUBJECTIVE AND OBJECTIVE BOX
Patient is a 73y old Female who presents with a chief complaint of abd pain (11 Nov 2024 08:22)    Overnight events/Interval History:  - No acute overnight events  - At bedside, patient looks comfortable, opens eyes to painful stimulation, follows commands     ROS:  - unable 2/2 mental status     Code Status: full code    ALLERGIES:  No Known Allergies    MEDICATIONS:  STANDING MEDICATIONS  amLODIPine   Tablet 10 milliGRAM(s) Oral daily  chlorhexidine 0.12% Liquid 15 milliLiter(s) Oral Mucosa every 12 hours  chlorhexidine 2% Cloths 1 Application(s) Topical <User Schedule>  dexMEDEtomidine Infusion 0.05 MICROgram(s)/kG/Hr IV Continuous <Continuous>  enoxaparin Injectable 90 milliGRAM(s) SubCutaneous every 12 hours  losartan 50 milliGRAM(s) Oral daily  meropenem  IVPB 1000 milliGRAM(s) IV Intermittent every 8 hours  pantoprazole  Injectable 40 milliGRAM(s) IV Push two times a day    PRN MEDICATIONS  acetaminophen     Tablet .. 650 milliGRAM(s) Oral every 6 hours PRN    ICU Vital Signs Last 24 Hrs  T(C): 37.2 (12 Nov 2024 08:00), Max: 37.5 (11 Nov 2024 16:00)  T(F): 99 (12 Nov 2024 08:00), Max: 99.5 (11 Nov 2024 16:00)  HR: 85 (12 Nov 2024 09:36) (70 - 89)  BP: 132/59 (12 Nov 2024 09:30) (100/51 - 154/65)  BP(mean): 85 (12 Nov 2024 09:30) (73 - 117)  ABP: --  ABP(mean): --  RR: 25 (12 Nov 2024 09:30) (15 - 35)  SpO2: 99% (12 Nov 2024 09:36) (91% - 100%)    O2 Parameters below as of 12 Nov 2024 08:00  Patient On (Oxygen Delivery Method): ventilator    O2 Concentration (%): 40    Mode: AC/ CMV (Assist Control/ Continuous Mandatory Ventilation)  RR (machine): 14  TV (machine): 360  FiO2: 40  PEEP: 8  ITime: 0.7  MAP: 17  PIP: 40    PHYSICAL EXAM:  GENERAL: NAD  HEENT:  Moist mucous membranes  CHEST/LUNG: Clear to auscultation bilaterally; normal respiratory effort  HEART: Regular rate and rhythm  ABDOMEN: Soft, nontender, nondistended  EXTREMITIES:  No lower extremity edema bilaterally  NERVOUS SYSTEM: opens eyes to painful stimulation, follows commands (+handgrip, toe movement), tracking    LABS:                        7.9    9.21  )-----------( 179      ( 12 Nov 2024 04:20 )             25.7     11-12    150[H]  |  109  |  22[H]  ----------------------------<  122[H]  3.5   |  32  |  0.7    Ca    10.0      12 Nov 2024 04:20  Mg     1.9     11-12    TPro  4.7[L]  /  Alb  2.5[L]  /  TBili  0.4  /  DBili  x   /  AST  10  /  ALT  10  /  AlkPhos  154[H]  11-12    PT/INR - ( 12 Nov 2024 04:20 )   PT: 11.30 sec;   INR: 0.96 ratio         PTT - ( 12 Nov 2024 04:20 )  PTT:44.3 sec  Urinalysis Basic - ( 12 Nov 2024 04:20 )    Color: x / Appearance: x / SG: x / pH: x  Gluc: 122 mg/dL / Ketone: x  / Bili: x / Urobili: x   Blood: x / Protein: x / Nitrite: x   Leuk Esterase: x / RBC: x / WBC x   Sq Epi: x / Non Sq Epi: x / Bacteria: x      ABG - ( 12 Nov 2024 04:13 )  pH, Arterial: 7.51  pH, Blood: x     /  pCO2: 41    /  pO2: 141   / HCO3: 33    / Base Excess: 8.9   /  SaO2: 96.3                      RADIOLOGY:  CXR  Xray Chest 1 View- PORTABLE-Urgent:   ACC: [id number] EXAM:  XR CHEST PORTABLE URGENT 1V   ORDERED BY: CEDRICK CHEEK     PROCEDURE DATE:  11/09/2024          INTERPRETATION:  CLINICAL HISTORY: Enteric tube.    COMPARISON: Same day.    TECHNIQUE: Portable frontal chest radiograph. Adequate positioning.    FINDINGS:    Support devices: Stable enteric tube courses below the diaphragm. Stable   ET tube.    Cardiac/mediastinum/hilum: Unchanged    Lung parenchyma/Pleura: Right parenchymal opacities and bibasilar   effusions without difference.    Skeleton/soft tissues: Unchanged      IMPRESSION:    Bilateral opacities and effusions without difference.    --- End of Report ---            CASSIE HUTSON MD; Attending Radiologist  This document has been electronically signed. Nov 10 2024  8:38AM (11-09-24 @ 17:11)      CT

## 2024-11-13 LAB
ALBUMIN SERPL ELPH-MCNC: 2.8 G/DL — LOW (ref 3.5–5.2)
ALP SERPL-CCNC: 141 U/L — HIGH (ref 30–115)
ALT FLD-CCNC: 9 U/L — SIGNIFICANT CHANGE UP (ref 0–41)
ANION GAP SERPL CALC-SCNC: 9 MMOL/L — SIGNIFICANT CHANGE UP (ref 7–14)
ANION GAP SERPL CALC-SCNC: 9 MMOL/L — SIGNIFICANT CHANGE UP (ref 7–14)
APTT BLD: 47.4 SEC — HIGH (ref 27–39.2)
AST SERPL-CCNC: 9 U/L — SIGNIFICANT CHANGE UP (ref 0–41)
BASOPHILS # BLD AUTO: 0.03 K/UL — SIGNIFICANT CHANGE UP (ref 0–0.2)
BASOPHILS NFR BLD AUTO: 0.4 % — SIGNIFICANT CHANGE UP (ref 0–1)
BILIRUB SERPL-MCNC: 0.4 MG/DL — SIGNIFICANT CHANGE UP (ref 0.2–1.2)
BUN SERPL-MCNC: 19 MG/DL — SIGNIFICANT CHANGE UP (ref 10–20)
BUN SERPL-MCNC: 21 MG/DL — HIGH (ref 10–20)
CALCIUM SERPL-MCNC: 10 MG/DL — SIGNIFICANT CHANGE UP (ref 8.4–10.4)
CALCIUM SERPL-MCNC: 9.5 MG/DL — SIGNIFICANT CHANGE UP (ref 8.4–10.4)
CHLORIDE SERPL-SCNC: 108 MMOL/L — SIGNIFICANT CHANGE UP (ref 98–110)
CHLORIDE SERPL-SCNC: 108 MMOL/L — SIGNIFICANT CHANGE UP (ref 98–110)
CO2 SERPL-SCNC: 32 MMOL/L — SIGNIFICANT CHANGE UP (ref 17–32)
CO2 SERPL-SCNC: 34 MMOL/L — HIGH (ref 17–32)
CREAT SERPL-MCNC: 0.8 MG/DL — SIGNIFICANT CHANGE UP (ref 0.7–1.5)
CREAT SERPL-MCNC: 0.9 MG/DL — SIGNIFICANT CHANGE UP (ref 0.7–1.5)
EGFR: 68 ML/MIN/1.73M2 — SIGNIFICANT CHANGE UP
EGFR: 79 ML/MIN/1.73M2 — SIGNIFICANT CHANGE UP
EOSINOPHIL # BLD AUTO: 0.32 K/UL — SIGNIFICANT CHANGE UP (ref 0–0.7)
EOSINOPHIL NFR BLD AUTO: 4.2 % — SIGNIFICANT CHANGE UP (ref 0–8)
GAS PNL BLDA: SIGNIFICANT CHANGE UP
GLUCOSE BLDC GLUCOMTR-MCNC: 101 MG/DL — HIGH (ref 70–99)
GLUCOSE SERPL-MCNC: 136 MG/DL — HIGH (ref 70–99)
GLUCOSE SERPL-MCNC: 99 MG/DL — SIGNIFICANT CHANGE UP (ref 70–99)
HCT VFR BLD CALC: 26.7 % — LOW (ref 37–47)
HGB BLD-MCNC: 8.1 G/DL — LOW (ref 12–16)
IMM GRANULOCYTES NFR BLD AUTO: 0.9 % — HIGH (ref 0.1–0.3)
INR BLD: 1 RATIO — SIGNIFICANT CHANGE UP (ref 0.65–1.3)
LYMPHOCYTES # BLD AUTO: 1.49 K/UL — SIGNIFICANT CHANGE UP (ref 1.2–3.4)
LYMPHOCYTES # BLD AUTO: 19.5 % — LOW (ref 20.5–51.1)
MAGNESIUM SERPL-MCNC: 1.7 MG/DL — LOW (ref 1.8–2.4)
MCHC RBC-ENTMCNC: 29.2 PG — SIGNIFICANT CHANGE UP (ref 27–31)
MCHC RBC-ENTMCNC: 30.3 G/DL — LOW (ref 32–37)
MCV RBC AUTO: 96.4 FL — SIGNIFICANT CHANGE UP (ref 81–99)
MONOCYTES # BLD AUTO: 0.85 K/UL — HIGH (ref 0.1–0.6)
MONOCYTES NFR BLD AUTO: 11.1 % — HIGH (ref 1.7–9.3)
NEUTROPHILS # BLD AUTO: 4.9 K/UL — SIGNIFICANT CHANGE UP (ref 1.4–6.5)
NEUTROPHILS NFR BLD AUTO: 63.9 % — SIGNIFICANT CHANGE UP (ref 42.2–75.2)
NRBC # BLD: 0 /100 WBCS — SIGNIFICANT CHANGE UP (ref 0–0)
PHOSPHATE SERPL-MCNC: 1.5 MG/DL — LOW (ref 2.1–4.9)
PLATELET # BLD AUTO: 166 K/UL — SIGNIFICANT CHANGE UP (ref 130–400)
PMV BLD: 12.1 FL — HIGH (ref 7.4–10.4)
POTASSIUM SERPL-MCNC: 3.4 MMOL/L — LOW (ref 3.5–5)
POTASSIUM SERPL-MCNC: 4.1 MMOL/L — SIGNIFICANT CHANGE UP (ref 3.5–5)
POTASSIUM SERPL-SCNC: 3.4 MMOL/L — LOW (ref 3.5–5)
POTASSIUM SERPL-SCNC: 4.1 MMOL/L — SIGNIFICANT CHANGE UP (ref 3.5–5)
PROT SERPL-MCNC: 5.2 G/DL — LOW (ref 6–8)
PROTHROM AB SERPL-ACNC: 11.8 SEC — SIGNIFICANT CHANGE UP (ref 9.95–12.87)
RBC # BLD: 2.77 M/UL — LOW (ref 4.2–5.4)
RBC # FLD: 13.1 % — SIGNIFICANT CHANGE UP (ref 11.5–14.5)
SODIUM SERPL-SCNC: 149 MMOL/L — HIGH (ref 135–146)
SODIUM SERPL-SCNC: 151 MMOL/L — HIGH (ref 135–146)
TSH SERPL-MCNC: 0.78 UIU/ML — SIGNIFICANT CHANGE UP (ref 0.27–4.2)
WBC # BLD: 7.66 K/UL — SIGNIFICANT CHANGE UP (ref 4.8–10.8)
WBC # FLD AUTO: 7.66 K/UL — SIGNIFICANT CHANGE UP (ref 4.8–10.8)

## 2024-11-13 PROCEDURE — 74018 RADEX ABDOMEN 1 VIEW: CPT | Mod: 26

## 2024-11-13 PROCEDURE — 71045 X-RAY EXAM CHEST 1 VIEW: CPT | Mod: 26,77,76

## 2024-11-13 PROCEDURE — 71045 X-RAY EXAM CHEST 1 VIEW: CPT | Mod: 26

## 2024-11-13 PROCEDURE — 99291 CRITICAL CARE FIRST HOUR: CPT

## 2024-11-13 RX ORDER — POTASSIUM CHLORIDE 600 MG/1
20 TABLET, EXTENDED RELEASE ORAL
Refills: 0 | Status: COMPLETED | OUTPATIENT
Start: 2024-11-13 | End: 2024-11-13

## 2024-11-13 RX ORDER — FUROSEMIDE 40 MG/1
60 TABLET ORAL ONCE
Refills: 0 | Status: COMPLETED | OUTPATIENT
Start: 2024-11-13 | End: 2024-11-13

## 2024-11-13 RX ORDER — FENTANYL 12 UG/H
50 PATCH, EXTENDED RELEASE TRANSDERMAL ONCE
Refills: 0 | Status: DISCONTINUED | OUTPATIENT
Start: 2024-11-13 | End: 2024-11-13

## 2024-11-13 RX ADMIN — POTASSIUM CHLORIDE 50 MILLIEQUIVALENT(S): 600 TABLET, EXTENDED RELEASE ORAL at 09:03

## 2024-11-13 RX ADMIN — AMLODIPINE BESYLATE 10 MILLIGRAM(S): 10 TABLET ORAL at 06:30

## 2024-11-13 RX ADMIN — ENOXAPARIN SODIUM 90 MILLIGRAM(S): 30 INJECTION SUBCUTANEOUS at 21:09

## 2024-11-13 RX ADMIN — LOSARTAN POTASSIUM 50 MILLIGRAM(S): 100 TABLET, FILM COATED ORAL at 06:30

## 2024-11-13 RX ADMIN — CHLORHEXIDINE GLUCONATE 1 APPLICATION(S): 1.2 RINSE ORAL at 06:31

## 2024-11-13 RX ADMIN — MEROPENEM 100 MILLIGRAM(S): 500 INJECTION, POWDER, FOR SOLUTION INTRAVENOUS at 14:14

## 2024-11-13 RX ADMIN — POTASSIUM CHLORIDE 50 MILLIEQUIVALENT(S): 600 TABLET, EXTENDED RELEASE ORAL at 06:29

## 2024-11-13 RX ADMIN — FENTANYL 50 MICROGRAM(S): 12 PATCH, EXTENDED RELEASE TRANSDERMAL at 06:59

## 2024-11-13 RX ADMIN — ENOXAPARIN SODIUM 90 MILLIGRAM(S): 30 INJECTION SUBCUTANEOUS at 09:06

## 2024-11-13 RX ADMIN — MEROPENEM 100 MILLIGRAM(S): 500 INJECTION, POWDER, FOR SOLUTION INTRAVENOUS at 06:30

## 2024-11-13 RX ADMIN — FUROSEMIDE 60 MILLIGRAM(S): 40 TABLET ORAL at 18:56

## 2024-11-13 RX ADMIN — PANTOPRAZOLE SODIUM 40 MILLIGRAM(S): 40 TABLET, DELAYED RELEASE ORAL at 17:31

## 2024-11-13 RX ADMIN — CHLORHEXIDINE GLUCONATE 15 MILLILITER(S): 1.2 RINSE ORAL at 06:30

## 2024-11-13 RX ADMIN — NYSTATIN 1 APPLICATION(S): 100000 POWDER TOPICAL at 17:31

## 2024-11-13 RX ADMIN — NYSTATIN 1 APPLICATION(S): 100000 POWDER TOPICAL at 06:29

## 2024-11-13 RX ADMIN — PANTOPRAZOLE SODIUM 40 MILLIGRAM(S): 40 TABLET, DELAYED RELEASE ORAL at 06:30

## 2024-11-13 RX ADMIN — Medication 25 GRAM(S): at 06:31

## 2024-11-13 NOTE — PROGRESS NOTE ADULT - SUBJECTIVE AND OBJECTIVE BOX
DARCIE PATRICIA  73y, Female  Allergy: No Known Allergies      LOS  10d    CHIEF COMPLAINT: abd pain (13 Nov 2024 06:41)      INTERVAL EVENTS/HPI  - T(F): , Max: 100 (11-12-24 @ 20:00), BIPAP   - WBC Count: 7.66 (11-13-24 @ 04:28)  WBC Count: 9.21 (11-12-24 @ 04:20)     - Creatinine: 0.9 (11-13-24 @ 04:28)  Creatinine: 0.8 (11-12-24 @ 16:58)     -   -   -     ROS  cannot obtain secondary to patient's sedation and/or mental status    VITALS:  T(F): 99.5, Max: 100 (11-12-24 @ 20:00)  HR: 80  BP: 139/64  RR: 31Vital Signs Last 24 Hrs  T(C): 37.5 (13 Nov 2024 08:00), Max: 37.8 (12 Nov 2024 20:00)  T(F): 99.5 (13 Nov 2024 08:00), Max: 100 (12 Nov 2024 20:00)  HR: 80 (13 Nov 2024 11:00) (59 - 92)  BP: 139/64 (13 Nov 2024 11:00) (89/50 - 173/75)  BP(mean): 92 (13 Nov 2024 11:00) (67 - 108)  RR: 31 (13 Nov 2024 11:00) (24 - 48)  SpO2: 96% (13 Nov 2024 11:00) (83% - 100%)    Parameters below as of 13 Nov 2024 09:15  Patient On (Oxygen Delivery Method): BiPAP/CPAP        PHYSICAL EXAM:  Gen: chronically ill appearing on BIPAP   HEENT: Normocephalic, atraumatic  Neck: supple, no lymphadenopathy  CV: Regular rate & regular rhythm  Lungs: decreased BS at bases, no fremitus  Abdomen: Soft, BS present  Ext: Warm, well perfused  Neuro: non focal  Skin: no rash, no erythema  Lines: no phlebitis     FH: Non-contributory  Social Hx: Non-contributory    TESTS & MEASUREMENTS:                        8.1    7.66  )-----------( 166      ( 13 Nov 2024 04:28 )             26.7     11-13    151[H]  |  108  |  21[H]  ----------------------------<  136[H]  3.4[L]   |  34[H]  |  0.9    Ca    9.5      13 Nov 2024 04:28  Phos  1.5     11-13  Mg     1.7     11-13    TPro  5.2[L]  /  Alb  2.8[L]  /  TBili  0.4  /  DBili  x   /  AST  9   /  ALT  9   /  AlkPhos  141[H]  11-13      LIVER FUNCTIONS - ( 13 Nov 2024 04:28 )  Alb: 2.8 g/dL / Pro: 5.2 g/dL / ALK PHOS: 141 U/L / ALT: 9 U/L / AST: 9 U/L / GGT: x           Urinalysis Basic - ( 13 Nov 2024 04:28 )    Color: x / Appearance: x / SG: x / pH: x  Gluc: 136 mg/dL / Ketone: x  / Bili: x / Urobili: x   Blood: x / Protein: x / Nitrite: x   Leuk Esterase: x / RBC: x / WBC x   Sq Epi: x / Non Sq Epi: x / Bacteria: x        Culture - Fungal, Bronchial (collected 11-12-24 @ 09:01)  Source: Bronchial  Preliminary Report (11-13-24 @ 07:15):    Testing in progress    Culture - Acid Fast - Bronchial w/Smear (collected 11-12-24 @ 09:01)  Source: Bronch Wash    Culture - Bronchial (collected 11-12-24 @ 09:01)  Source: Bronch Wash  Gram Stain (11-12-24 @ 20:33):    No Squamous epithelial cells seen per low power field    Rare polymorphonuclear leukocytes seen per low power field    No organisms seen per oil power field    Culture - Sputum (collected 11-08-24 @ 12:36)  Source: Trach Asp Tracheal Aspirate  Gram Stain (11-09-24 @ 07:34):    Few polymorphonuclear leukocytes per low power field    No Squamous epithelial cells per low power field    Rare Gram positive cocci in pairs seen per oil power field    Rare Gram Negative Rods seen per oil power field  Final Report (11-10-24 @ 09:45):    No growth    Urinalysis with Rflx Culture (collected 11-03-24 @ 13:51)    Culture - Stool (collected 11-03-24 @ 13:51)  Source: .Stool  Final Report (11-05-24 @ 19:02):    No enteric pathogens isolated.    (Stool culture examined for Salmonella,    Shigella, Campylobacter, Aeromonas, Plesiomonas,    Vibrio, E.coli O157 and Yersinia)    Culture - Urine (collected 11-03-24 @ 13:51)  Source: Clean Catch None  Final Report (11-05-24 @ 12:11):    >100,000 CFU/ml Streptococcus gallolyticus "Susceptibilities not    performed"    <10,000 CFU/ml Normal Urogenital rosalva present    Culture - Blood (collected 11-03-24 @ 11:10)  Source: .Blood BLOOD  Final Report (11-08-24 @ 18:00):    No growth at 5 days    Culture - Blood (collected 11-03-24 @ 11:10)  Source: .Blood BLOOD  Final Report (11-08-24 @ 18:00):    No growth at 5 days            INFECTIOUS DISEASES TESTING  Procalcitonin: 5.22 (11-05-24 @ 19:32)  MRSA PCR Result.: Negative (11-04-24 @ 13:15)      INFLAMMATORY MARKERS      RADIOLOGY & ADDITIONAL TESTS:  I have personally reviewed the last available Chest xray  CXR  Xray Chest 1 View- PORTABLE-Urgent:   ACC: [id number] EXAM:  XR CHEST PORTABLE URGENT 1V   ORDERED BY: IRLANDA VILLALPANDO     PROCEDURE DATE:  11/12/2024          INTERPRETATION:  CLINICAL HISTORY: NGT Placement.    COMPARISON: Chest radiograph 11/12/2024 at 9:25 AM.    TECHNIQUE: Frontal chest radiograph.    FINDINGS:    Support devices: ET tube terminates 5.6 cm of the tor. Enteric tube   courses below the left hemidiaphragm.    Cardiac/mediastinum/hilum: Unchanged.    Lung parenchyma/Pleura: Essentially stable small left pleural effusion.   No pneumothorax.    Skeleton/soft tissues: Unchanged.      IMPRESSION:    Support devices as described.    Essentially stable small left pleural effusion.    --- End of Report ---            PARK TA MD; Attending Radiologist  This document has been electronically signed. Nov 12 2024  9:30PM (11-12-24 @ 21:41)      CT      CARDIOLOGY TESTING  12 Lead ECG:   Ventricular Rate 87 BPM    Atrial Rate 85 BPM    QRS Duration 110 ms    Q-T Interval 356 ms    QTC Calculation(Bazett) 428 ms    R Axis 67 degrees    T Axis -10 degrees    Diagnosis Line Atrial fibrillation  Low voltage QRS  Abnormal QRS-T angle, consider primary T wave abnormality  Abnormal ECG    Confirmed by Sanjeev Pierce ([id number]) on 11/7/2024 9:09:37 AM (11-06-24 @ 12:32)      MEDICATIONS  amLODIPine   Tablet 10  chlorhexidine 2% Cloths 1  dexMEDEtomidine Infusion 0.5  enoxaparin Injectable 90  losartan 50  meropenem  IVPB 1000  nystatin Cream 1  pantoprazole  Injectable 40      WEIGHT  Weight (kg): 91.6 (11-05-24 @ 01:00)  Creatinine: 0.9 mg/dL (11-13-24 @ 04:28)  Creatinine: 0.8 mg/dL (11-12-24 @ 16:58)      ANTIBIOTICS:  meropenem  IVPB 1000 milliGRAM(s) IV Intermittent every 8 hours      All available historical records have been reviewed

## 2024-11-13 NOTE — PROGRESS NOTE ADULT - ASSESSMENT
IMPRESSION:    Acute hypoxemic respiratory failure 40/8  Septic shock - resolved  UTI  Suspected colon mass  Severe gastric distension w/ pneumatosis along posterior gastric wall  Possible aspiration / LLL consolidation  Intraperitoneal free air  Thrombocytopenia   gastric ulcer   HAGMA  NOLA non oliguric resolved  Afib on Xarelto  HO HTN  HO ESBL Proteus    PLAN:    CNS: SAT daily. Pain control as needed.     HEENT: Oral care. ET care     PULMONARY:  HOB @ 45 degrees.  Aspiration precautions. vent changes: change peep to 8. keep SaO2 92 TO 96%  Monitor airway pressures, SBT today     CARDIOVASCULAR: TTE. Mod AS    GI: GI prophylaxis.  Feeding    RENAL:  Follow up lytes. Correct as needed.  Strict intake and output.  Burger for is and Os.    INFECTIOUS DISEASE: Follow up cultures. Meropenem, fup bronch cx    HEMATOLOGICAL:  Resume AC. Monitor CBC and coags.     ENDOCRINE:  Follow up FS. Insulin protocol if needed.    MUSCULOSKELETAL: Bed chair position.  Off loading    Full code    MICU   IMPRESSION:    Acute hypoxemic respiratory failure 40/8  Septic shock - resolved  UTI  Suspected colon mass  Severe gastric distension w/ pneumatosis along posterior gastric wall  Possible aspiration / LLL consolidation  Intraperitoneal free air  Thrombocytopenia   hypernatremia  gastric ulcer   HAGMA  NOLA non oliguric resolved  Afib on Xarelto  HO HTN  HO ESBL Proteus    PLAN:    CNS: SAT daily. Pain control as needed.     HEENT: Oral care. ET care     PULMONARY:  HOB @ 45 degrees.  Aspiration precautions. vent changes: change peep to 8. keep SaO2 92 TO 96%  Monitor airway pressures, SBT today     CARDIOVASCULAR: TTE. Mod AS    GI: GI prophylaxis.  Feeding    RENAL:  Follow up lytes. Correct as needed.  Strict intake and output.  Burger for is and Os.    INFECTIOUS DISEASE: Follow up cultures. Meropenem, fup bronch cx    HEMATOLOGICAL:  Resume AC. Monitor CBC and coags.     ENDOCRINE:  Follow up FS. Insulin protocol if needed. TSH    MUSCULOSKELETAL: Bed chair position.  Off loading    Full code    MICU

## 2024-11-13 NOTE — PROGRESS NOTE ADULT - SUBJECTIVE AND OBJECTIVE BOX
Patient is a 73y old Female who presents with a chief complaint of abd pain (11 Nov 2024 08:22)    Overnight events/Interval History:  - Overnight was tachypneic on vent s/p 50mcg fentanyl IVP  - At bedside, patient a little uncomfortable, opens eyes to voice, follows commands     ROS:  - unable 2/2 mental status     Code Status: full code    ALLERGIES:  No Known Allergies  MEDICATIONS:  STANDING MEDICATIONS  amLODIPine   Tablet 10 milliGRAM(s) Oral daily  chlorhexidine 2% Cloths 1 Application(s) Topical <User Schedule>  dexMEDEtomidine Infusion 0.5 MICROgram(s)/kG/Hr IV Continuous <Continuous>  enoxaparin Injectable 90 milliGRAM(s) SubCutaneous every 12 hours  losartan 50 milliGRAM(s) Oral daily  meropenem  IVPB 1000 milliGRAM(s) IV Intermittent every 8 hours  nystatin Cream 1 Application(s) Topical two times a day  pantoprazole  Injectable 40 milliGRAM(s) IV Push two times a day    PRN MEDICATIONS  acetaminophen     Tablet .. 650 milliGRAM(s) Oral every 6 hours PRN    ICU Vital Signs Last 24 Hrs  T(C): 37.5 (13 Nov 2024 08:00), Max: 37.8 (12 Nov 2024 20:00)  T(F): 99.5 (13 Nov 2024 08:00), Max: 100 (12 Nov 2024 20:00)  HR: 80 (13 Nov 2024 11:00) (59 - 92)  BP: 139/64 (13 Nov 2024 11:00) (89/50 - 173/75)  BP(mean): 92 (13 Nov 2024 11:00) (67 - 108)  ABP: --  ABP(mean): --  RR: 31 (13 Nov 2024 11:00) (24 - 48)  SpO2: 96% (13 Nov 2024 11:00) (83% - 100%)    O2 Parameters below as of 13 Nov 2024 09:15  Patient On (Oxygen Delivery Method): BiPAP/CPAP      PHYSICAL EXAM:  GENERAL: NAD  HEENT:  dry mucous membranes  CHEST/LUNG: Clear to auscultation bilaterally; normal respiratory effort  HEART: Regular rate and rhythm  ABDOMEN: Soft, nontender, mildly distended   EXTREMITIES:  No lower extremity edema bilaterally  NERVOUS SYSTEM: opens eyes to voice, tracking, following commands     LABS:                        8.1    7.66  )-----------( 166      ( 13 Nov 2024 04:28 )             26.7     11-13    151[H]  |  108  |  21[H]  ----------------------------<  136[H]  3.4[L]   |  34[H]  |  0.9    Ca    9.5      13 Nov 2024 04:28  Phos  1.5     11-13  Mg     1.7     11-13    TPro  5.2[L]  /  Alb  2.8[L]  /  TBili  0.4  /  DBili  x   /  AST  9   /  ALT  9   /  AlkPhos  141[H]  11-13    PT/INR - ( 13 Nov 2024 04:28 )   PT: 11.80 sec;   INR: 1.00 ratio         PTT - ( 13 Nov 2024 04:28 )  PTT:47.4 sec  Urinalysis Basic - ( 13 Nov 2024 04:28 )    Color: x / Appearance: x / SG: x / pH: x  Gluc: 136 mg/dL / Ketone: x  / Bili: x / Urobili: x   Blood: x / Protein: x / Nitrite: x   Leuk Esterase: x / RBC: x / WBC x   Sq Epi: x / Non Sq Epi: x / Bacteria: x      ABG - ( 13 Nov 2024 11:01 )  pH, Arterial: 7.55  pH, Blood: x     /  pCO2: 43    /  pO2: 84    / HCO3: 38    / Base Excess: 13.7  /  SaO2: 96.1                  Culture - Fungal, Bronchial (collected 12 Nov 2024 09:01)  Source: Bronchial  Preliminary Report (13 Nov 2024 07:15):    Testing in progress    Culture - Acid Fast - Bronchial w/Smear (collected 12 Nov 2024 09:01)  Source: Bronch Wash    Culture - Bronchial (collected 12 Nov 2024 09:01)  Source: Bronch Wash  Gram Stain (12 Nov 2024 20:33):    No Squamous epithelial cells seen per low power field    Rare polymorphonuclear leukocytes seen per low power field    No organisms seen per oil power field          RADIOLOGY:  CXR  Xray Chest 1 View- PORTABLE-Urgent:   ACC: [id number] EXAM:  XR CHEST PORTABLE URGENT 1V   ORDERED BY: IRLANDA VILLALPANDO     PROCEDURE DATE:  11/12/2024          INTERPRETATION:  CLINICAL HISTORY: NGT Placement.    COMPARISON: Chest radiograph 11/12/2024 at 9:25 AM.    TECHNIQUE: Frontal chest radiograph.    FINDINGS:    Support devices: ET tube terminates 5.6 cm of the tor. Enteric tube   courses below the left hemidiaphragm.    Cardiac/mediastinum/hilum: Unchanged.    Lung parenchyma/Pleura: Essentially stable small left pleural effusion.   No pneumothorax.    Skeleton/soft tissues: Unchanged.      IMPRESSION:    Support devices as described.    Essentially stable small left pleural effusion.    --- End of Report ---            PRAK TA MD; Attending Radiologist  This document has been electronically signed. Nov 12 2024  9:30PM (11-12-24 @ 21:41)  Xray Chest 1 View- PORTABLE-Urgent:   ACC: [id number] EXAM:  XR CHEST PORTABLE URGENT 1V   ORDERED BY: DANIELLE DUONG     *** ADDENDUM # 1 ***    Dictation error, impression should read    Impression:    Bibasilar opacities/effusions similar to prior.    --- End of Report ---    *** END OF ADDENDUM # 1 ***      PROCEDURE DATE:  11/12/2024          INTERPRETATION:  Clinical History: s/p Bronchoscopy.    Comparison : Chest radiograph 11/12/2024.    Technique/Positioning: Frontal chest radiograph.    Findings:    Support devices: Endotracheal tube in satisfactory position. Feeding tube   coursing into abdomen.    Cardiac/mediastinum/hilum: Unchanged.    Lung parenchyma/Pleura: Bibasilar opacities/effusions similar to prior.   No definite pneumothorax.    Skeleton/soft tissues: Unchanged    Impression:    No focal pulmonary consolidation        --- End of Report ---    ***Please see the addendum at the top of this report. It may contain   additional important information or changes.****        CHRISTINE ELLINGTON MD; Attending Radiologist  This document has been electronically signed. Nov 12 2024 12:07PM  1st Addendum: CHRISTINE ELLINGTON MD; Attending Radiologist  The first addendum was electronically signed on: Nov 13 2024  9:06AM. (11-12-24 @ 09:54)      CT

## 2024-11-13 NOTE — PROGRESS NOTE ADULT - ASSESSMENT
73-year-old female PMH A-fib on xarelto,, HTN, s/p cholecystectomy, congenital solitary kidney presents from NH to the ED for evaluation of weakness, decreased PO intake and abdominal distension.     Admitted to ICU for septic shock 2/2 ESBL proteus (UTI vs. GI source) complicated by gastric distension w/ pneumatosis, NOLA 2/2 ATN, hypernatremia. s/p NGT decompression (-3.1 L feculent output) in ED. Intubated 11/6 for AHRF. EGD 11/8 w/ multipl ulcers, largest 10cm) pending pathology. Now with new R colon/cecal mass pending colonoscopy when more stable.     #AHRF 2/2 septic shock due to ESBL proteus (UTI Vs. GI source) - improving  #HAGMA 2/2 lactic acidosis and uremia - resolved   #NOLA likely ATN 2/2 septic shock - resolved   #Possible aspiration  #Congenital solitary kidney  - Intubated 11/6, extubated 11/13 to AVAPS  - off pressors, wean precedex   - UCx +ESBL proteus, BCX (-), trach culture with resp rosalva  - f/u repeat sputum cx from bronch 11/12  - c/w freddie 1g q8h  - c/w AVAPS, wean as tolerated     #Hypernatremia  - inc to [id number] FW flushes with TF     #Abdominal distension w/ pneumatosis   #R colon/cecal mass on CT A/P  - NGT placed for decompression in ED with 3.1L feculent output   - GI recs, surgery recs, vascular recs appreciated   - s/p EGD 11/8 with multiple ulcers, erosive gastritis  - EGD path: chronic gastritis (-) Hpylori, no malignancy   - CT A/P with PO contrast w/ suspicious hepatic flexure narrowing and lobulated soft tissue density in R colon/cecum.   - IV PPI BID   - NPO w/ TF   - colonoscopy when more stable as per GI  - f/u TSH    #Sacral DTI  - offloading, wound care, pressure injury prevention    #Afib  - hold xarelto iso NOLA  - c/w tx lovenox 1mg/kg q12h    #HTN  - c/w amlodipine 10mg qd  - c/w losartan 50mg qd     ---------------------  DVT ppx: tx lovenox   Diet: NPO w/ TF   GI ppx/Bowel regimen: PPI BID  Activity: bedrest  GOC: full code  Dispo: ICU  #Summary/Handoff:  - extubated today, f/u 4pm BMP for hypernatremia, TSH

## 2024-11-13 NOTE — PROGRESS NOTE ADULT - TIME BILLING
I have personally seen and examined this patient.  I have reviewed all pertinent clinical information and reviewed all relevant imaging and diagnostic studies personally.   I discussed my recommendations with the ICU team
I have personally seen and examined this patient.  I have reviewed all pertinent clinical information and reviewed all relevant imaging and diagnostic studies personally.   I discussed my recommendations with the ICU team
Coordination of care

## 2024-11-13 NOTE — PROGRESS NOTE ADULT - SUBJECTIVE AND OBJECTIVE BOX
Over Night Events: events noted, remains critically ill, vent dependant, sp bronch failed SBT, low grade fever    PHYSICAL EXAM    ICU Vital Signs Last 24 Hrs  T(C): 37.3 (13 Nov 2024 04:00), Max: 37.8 (12 Nov 2024 20:00)  T(F): 99.2 (13 Nov 2024 04:00), Max: 100 (12 Nov 2024 20:00)  HR: 66 (13 Nov 2024 04:00) (62 - 89)  BP: 132/63 (13 Nov 2024 04:00) (89/50 - 173/75)  BP(mean): 90 (13 Nov 2024 04:00) (67 - 117)  RR: 35 (13 Nov 2024 04:00) (15 - 48)  SpO2: 100% (13 Nov 2024 04:00) (91% - 100%)    O2 Parameters below as of 13 Nov 2024 05:00  Patient On (Oxygen Delivery Method): ventilator    O2 Concentration (%): 40        General: ill looking  ETT  Lungs: Bilateral rhonchi  Cardiovascular: Regular   Abdomen: Soft, Positive BS  Extremities: No clubbing   follows commands      11-11-24 @ 07:01  -  11-12-24 @ 07:00  --------------------------------------------------------  IN:    Enteral Tube Flush: 130 mL    FentaNYL: 51 mL    Glucerna: 1100 mL    IV PiggyBack: 100 mL    Propofol: 191.7 mL  Total IN: 1572.7 mL    OUT:    Indwelling Catheter - Urethral (mL): 200 mL    Rectal Tube (mL): 200 mL    Voided (mL): 1350 mL  Total OUT: 1750 mL    Total NET: -177.3 mL      11-12-24 @ 07:01  -  11-13-24 @ 06:42  --------------------------------------------------------  IN:    Dexmedetomidine: 113.2 mL    Enteral Tube Flush: 30 mL    Free Water: 1250 mL    IV PiggyBack: 100 mL    Jevity 1.2: 1040 mL  Total IN: 2533.2 mL    OUT:    Rectal Tube (mL): 400 mL    Voided (mL): 2050 mL  Total OUT: 2450 mL    Total NET: 83.2 mL          LABS:                          8.1    7.66  )-----------( 166      ( 13 Nov 2024 04:28 )             26.7                                               11-13    151[H]  |  108  |  21[H]  ----------------------------<  136[H]  3.4[L]   |  34[H]  |  0.9    Ca    9.5      13 Nov 2024 04:28  Phos  1.5     11-13  Mg     1.7     11-13    TPro  5.2[L]  /  Alb  2.8[L]  /  TBili  0.4  /  DBili  x   /  AST  9   /  ALT  9   /  AlkPhos  141[H]  11-13      PT/INR - ( 13 Nov 2024 04:28 )   PT: 11.80 sec;   INR: 1.00 ratio         PTT - ( 13 Nov 2024 04:28 )  PTT:47.4 sec                                       Urinalysis Basic - ( 13 Nov 2024 04:28 )    Color: x / Appearance: x / SG: x / pH: x  Gluc: 136 mg/dL / Ketone: x  / Bili: x / Urobili: x   Blood: x / Protein: x / Nitrite: x   Leuk Esterase: x / RBC: x / WBC x   Sq Epi: x / Non Sq Epi: x / Bacteria: x                                                  LIVER FUNCTIONS - ( 13 Nov 2024 04:28 )  Alb: 2.8 g/dL / Pro: 5.2 g/dL / ALK PHOS: 141 U/L / ALT: 9 U/L / AST: 9 U/L / GGT: x                                                  Culture - Acid Fast - Bronchial w/Smear (collected 12 Nov 2024 09:01)  Source: Bronch Wash    Culture - Bronchial (collected 12 Nov 2024 09:01)  Source: Bronch Wash  Gram Stain (12 Nov 2024 20:33):    No Squamous epithelial cells seen per low power field    Rare polymorphonuclear leukocytes seen per low power field    No organisms seen per oil power field                                                   Mode: AC/ CMV (Assist Control/ Continuous Mandatory Ventilation)  RR (machine): 14  TV (machine): 360  FiO2: 40  PEEP: 8  ITime: 0.7  MAP: 15  PIP: 28                                      ABG - ( 13 Nov 2024 03:49 )  pH, Arterial: 7.53  pH, Blood: x     /  pCO2: 45    /  pO2: 88    / HCO3: 38    / Base Excess: 13.4  /  SaO2: 96.0                MEDICATIONS  (STANDING):  amLODIPine   Tablet 10 milliGRAM(s) Oral daily  chlorhexidine 0.12% Liquid 15 milliLiter(s) Oral Mucosa every 12 hours  chlorhexidine 2% Cloths 1 Application(s) Topical <User Schedule>  dexMEDEtomidine Infusion 0.5 MICROgram(s)/kG/Hr (11.5 mL/Hr) IV Continuous <Continuous>  enoxaparin Injectable 90 milliGRAM(s) SubCutaneous every 12 hours  losartan 50 milliGRAM(s) Oral daily  meropenem  IVPB 1000 milliGRAM(s) IV Intermittent every 8 hours  nystatin Cream 1 Application(s) Topical two times a day  pantoprazole  Injectable 40 milliGRAM(s) IV Push two times a day  potassium chloride  20 mEq/100 mL IVPB 20 milliEquivalent(s) IV Intermittent every 2 hours    MEDICATIONS  (PRN):  acetaminophen     Tablet .. 650 milliGRAM(s) Oral every 6 hours PRN Temp greater or equal to 38C (100.4F)      cxr noted     Over Night Events: events noted, remains critically ill, vent dependant, sp bronch failed SBT, low grade fever, precedex    PHYSICAL EXAM    ICU Vital Signs Last 24 Hrs  T(C): 37.3 (13 Nov 2024 04:00), Max: 37.8 (12 Nov 2024 20:00)  T(F): 99.2 (13 Nov 2024 04:00), Max: 100 (12 Nov 2024 20:00)  HR: 66 (13 Nov 2024 04:00) (62 - 89)  BP: 132/63 (13 Nov 2024 04:00) (89/50 - 173/75)  BP(mean): 90 (13 Nov 2024 04:00) (67 - 117)  RR: 35 (13 Nov 2024 04:00) (15 - 48)  SpO2: 100% (13 Nov 2024 04:00) (91% - 100%)    O2 Parameters below as of 13 Nov 2024 05:00  Patient On (Oxygen Delivery Method): ventilator    O2 Concentration (%): 40        General: ill looking  ETT  Lungs: Bilateral rhonchi  Cardiovascular: Regular   Abdomen: Soft, Positive BS  Extremities: No clubbing   follows commands      11-11-24 @ 07:01  -  11-12-24 @ 07:00  --------------------------------------------------------  IN:    Enteral Tube Flush: 130 mL    FentaNYL: 51 mL    Glucerna: 1100 mL    IV PiggyBack: 100 mL    Propofol: 191.7 mL  Total IN: 1572.7 mL    OUT:    Indwelling Catheter - Urethral (mL): 200 mL    Rectal Tube (mL): 200 mL    Voided (mL): 1350 mL  Total OUT: 1750 mL    Total NET: -177.3 mL      11-12-24 @ 07:01  -  11-13-24 @ 06:42  --------------------------------------------------------  IN:    Dexmedetomidine: 113.2 mL    Enteral Tube Flush: 30 mL    Free Water: 1250 mL    IV PiggyBack: 100 mL    Jevity 1.2: 1040 mL  Total IN: 2533.2 mL    OUT:    Rectal Tube (mL): 400 mL    Voided (mL): 2050 mL  Total OUT: 2450 mL    Total NET: 83.2 mL          LABS:                          8.1    7.66  )-----------( 166      ( 13 Nov 2024 04:28 )             26.7                                               11-13    151[H]  |  108  |  21[H]  ----------------------------<  136[H]  3.4[L]   |  34[H]  |  0.9    Ca    9.5      13 Nov 2024 04:28  Phos  1.5     11-13  Mg     1.7     11-13    TPro  5.2[L]  /  Alb  2.8[L]  /  TBili  0.4  /  DBili  x   /  AST  9   /  ALT  9   /  AlkPhos  141[H]  11-13      PT/INR - ( 13 Nov 2024 04:28 )   PT: 11.80 sec;   INR: 1.00 ratio         PTT - ( 13 Nov 2024 04:28 )  PTT:47.4 sec                                       Urinalysis Basic - ( 13 Nov 2024 04:28 )    Color: x / Appearance: x / SG: x / pH: x  Gluc: 136 mg/dL / Ketone: x  / Bili: x / Urobili: x   Blood: x / Protein: x / Nitrite: x   Leuk Esterase: x / RBC: x / WBC x   Sq Epi: x / Non Sq Epi: x / Bacteria: x                                                  LIVER FUNCTIONS - ( 13 Nov 2024 04:28 )  Alb: 2.8 g/dL / Pro: 5.2 g/dL / ALK PHOS: 141 U/L / ALT: 9 U/L / AST: 9 U/L / GGT: x                                                  Culture - Acid Fast - Bronchial w/Smear (collected 12 Nov 2024 09:01)  Source: Bronch Wash    Culture - Bronchial (collected 12 Nov 2024 09:01)  Source: Bronch Wash  Gram Stain (12 Nov 2024 20:33):    No Squamous epithelial cells seen per low power field    Rare polymorphonuclear leukocytes seen per low power field    No organisms seen per oil power field                                                   Mode: AC/ CMV (Assist Control/ Continuous Mandatory Ventilation)  RR (machine): 14  TV (machine): 360  FiO2: 40  PEEP: 8  ITime: 0.7  MAP: 15  PIP: 28                                      ABG - ( 13 Nov 2024 03:49 )  pH, Arterial: 7.53  pH, Blood: x     /  pCO2: 45    /  pO2: 88    / HCO3: 38    / Base Excess: 13.4  /  SaO2: 96.0                MEDICATIONS  (STANDING):  amLODIPine   Tablet 10 milliGRAM(s) Oral daily  chlorhexidine 0.12% Liquid 15 milliLiter(s) Oral Mucosa every 12 hours  chlorhexidine 2% Cloths 1 Application(s) Topical <User Schedule>  dexMEDEtomidine Infusion 0.5 MICROgram(s)/kG/Hr (11.5 mL/Hr) IV Continuous <Continuous>  enoxaparin Injectable 90 milliGRAM(s) SubCutaneous every 12 hours  losartan 50 milliGRAM(s) Oral daily  meropenem  IVPB 1000 milliGRAM(s) IV Intermittent every 8 hours  nystatin Cream 1 Application(s) Topical two times a day  pantoprazole  Injectable 40 milliGRAM(s) IV Push two times a day  potassium chloride  20 mEq/100 mL IVPB 20 milliEquivalent(s) IV Intermittent every 2 hours    MEDICATIONS  (PRN):  acetaminophen     Tablet .. 650 milliGRAM(s) Oral every 6 hours PRN Temp greater or equal to 38C (100.4F)      cxr noted

## 2024-11-13 NOTE — PHARMACOTHERAPY INTERVENTION NOTE - NSPHARMCOMMASP
ASP - De-escalation
ASP - Therapy recommended/ Alternative therapy
ASP - De-escalation
ASP - Renal dose adjustment
ASP - Lab/ test recommended

## 2024-11-13 NOTE — PROGRESS NOTE ADULT - ASSESSMENT
ASSESSMENT  73-year-old female PMH A-fib on xarelto,, HTN, s/p cholecystectomy, congenital solitary kidney presents from NH to the ED for evaluation of weakness, decreased PO intake and abdominal distension. Pt's son Everton says pt has not been feeling well the past few days, and has had poor appetite which is unlike her. She's been more tired and weak, unable to get out of bed. Has not had a bowel movement in 3 days, pt unsure if she's been passing gas otherwise. She was noted to be hypotensive and have an elevated wbc count at the NH as well. No dysuria, chest pain, SOB, cough or fever per pt otherwise.  No other complaints currently.     In the ED, BP 66/45 S/P LR 2800cc bolus with no improvement in BP, started on peripheral levo. Labs with wbc 19.29, vbg lactate 2.0-->2.3, pH 7.2, pCO2 45, Na 133, AG 21, Cr 4.3 (baseline 1.1), UA contaminated. CTAP with severe gastric distension, multiple foci of intraperitoneal free air and urinary bladder thickening with free air. Surgery consulted, no intervention for now, NGT placed for gastric decompression with 3.1L feculent output,     IMPRESSIONS  #Fever resolved,   s/p bronch for mucous plugging    11/8 sputum   No growth  # intubated on mechanical ventilation   #Septic shock requiring pressors   Rule out acute cystitis/pyelo as UA pyuria > 900; UCX   >100,000 CFU/ml Streptococcus gallolyticus "Susceptibilities not performed"  Rule out mesenteric ischemia- Severe gastric distension w/ pneumatosis along posterior gastric wall. Free air     Admission WBC 19--> 21    11/3 BCX NGTD x2  11/8 CTAP repeat   In comparison to 11/3/2024:  1.  Interval placement of enteric tube with previously seen gastric   distention decompressed. Previously seen pneumatosis no longer visualized   on the current study. Previous small locules of free intraperitoneal air   no longer visualized. Previously seen trace pneumobilia is no longer   visualized.  2.  Bilateral lower lobe consolidations with trace right greater than   left underlying effusions. Atelectasis and/or pneumonia.  3.  There appears to be a focal region of narrowing in the colon at the   hepatic flexure with large lobulated soft tissue density in the right   colon and cecum not typical appearance of stool. Neoplastic process   cannot be excluded. Consider correlation with colonoscopy as clinically   appropriate.    Stool cx NG    1/2023 UCX ESBL proteus     Cdiff (-)   #Lactic acidosis  #NOLA   Creatinine: 0.9 mg/dL (11.08.24 @ 04:07) crcl 65    RECOMMENDATIONS  - D/C meropenem and monitor off antimicrobials   - Strep gallolyticus can be associated with underlying GI malignancy , CT with  large lobulated soft tissue density in the right   colon and cecum not typical appearance of stool. High suspicion. workup per primary team   - GI / Surgery   - Poor prognosis, GOC  - Please recall ID PRN. Please inform ID of any patient clinical change or any new pertinent laboratory or radiographic data     If any questions, please text or call on [employer] Teams  Please continue to update ID with any pertinent new clinical, laboratory or radiographic findings

## 2024-11-14 LAB
ALBUMIN SERPL ELPH-MCNC: 3.1 G/DL — LOW (ref 3.5–5.2)
ALP SERPL-CCNC: 153 U/L — HIGH (ref 30–115)
ALT FLD-CCNC: 9 U/L — SIGNIFICANT CHANGE UP (ref 0–41)
ANION GAP SERPL CALC-SCNC: 10 MMOL/L — SIGNIFICANT CHANGE UP (ref 7–14)
ANION GAP SERPL CALC-SCNC: 8 MMOL/L — SIGNIFICANT CHANGE UP (ref 7–14)
AST SERPL-CCNC: 12 U/L — SIGNIFICANT CHANGE UP (ref 0–41)
BASOPHILS # BLD AUTO: 0.07 K/UL — SIGNIFICANT CHANGE UP (ref 0–0.2)
BASOPHILS NFR BLD AUTO: 0.6 % — SIGNIFICANT CHANGE UP (ref 0–1)
BILIRUB SERPL-MCNC: 0.7 MG/DL — SIGNIFICANT CHANGE UP (ref 0.2–1.2)
BUN SERPL-MCNC: 20 MG/DL — SIGNIFICANT CHANGE UP (ref 10–20)
BUN SERPL-MCNC: 21 MG/DL — HIGH (ref 10–20)
CALCIUM SERPL-MCNC: 10.1 MG/DL — SIGNIFICANT CHANGE UP (ref 8.4–10.4)
CALCIUM SERPL-MCNC: 10.4 MG/DL — SIGNIFICANT CHANGE UP (ref 8.4–10.5)
CHLORIDE SERPL-SCNC: 107 MMOL/L — SIGNIFICANT CHANGE UP (ref 98–110)
CHLORIDE SERPL-SCNC: 110 MMOL/L — SIGNIFICANT CHANGE UP (ref 98–110)
CO2 SERPL-SCNC: 32 MMOL/L — SIGNIFICANT CHANGE UP (ref 17–32)
CO2 SERPL-SCNC: 33 MMOL/L — HIGH (ref 17–32)
CREAT SERPL-MCNC: 0.8 MG/DL — SIGNIFICANT CHANGE UP (ref 0.7–1.5)
CREAT SERPL-MCNC: 0.9 MG/DL — SIGNIFICANT CHANGE UP (ref 0.7–1.5)
CULTURE RESULTS: SIGNIFICANT CHANGE UP
EGFR: 68 ML/MIN/1.73M2 — SIGNIFICANT CHANGE UP
EGFR: 79 ML/MIN/1.73M2 — SIGNIFICANT CHANGE UP
EOSINOPHIL # BLD AUTO: 0.18 K/UL — SIGNIFICANT CHANGE UP (ref 0–0.7)
EOSINOPHIL NFR BLD AUTO: 1.5 % — SIGNIFICANT CHANGE UP (ref 0–8)
GLUCOSE SERPL-MCNC: 104 MG/DL — HIGH (ref 70–99)
GLUCOSE SERPL-MCNC: 119 MG/DL — HIGH (ref 70–99)
HCT VFR BLD CALC: 31.1 % — LOW (ref 37–47)
HGB BLD-MCNC: 9.2 G/DL — LOW (ref 12–16)
IMM GRANULOCYTES NFR BLD AUTO: 0.6 % — HIGH (ref 0.1–0.3)
LYMPHOCYTES # BLD AUTO: 1.57 K/UL — SIGNIFICANT CHANGE UP (ref 1.2–3.4)
LYMPHOCYTES # BLD AUTO: 13.5 % — LOW (ref 20.5–51.1)
MAGNESIUM SERPL-MCNC: 2 MG/DL — SIGNIFICANT CHANGE UP (ref 1.8–2.4)
MCHC RBC-ENTMCNC: 28.8 PG — SIGNIFICANT CHANGE UP (ref 27–31)
MCHC RBC-ENTMCNC: 29.6 G/DL — LOW (ref 32–37)
MCV RBC AUTO: 97.2 FL — SIGNIFICANT CHANGE UP (ref 81–99)
MONOCYTES # BLD AUTO: 0.88 K/UL — HIGH (ref 0.1–0.6)
MONOCYTES NFR BLD AUTO: 7.6 % — SIGNIFICANT CHANGE UP (ref 1.7–9.3)
NEUTROPHILS # BLD AUTO: 8.87 K/UL — HIGH (ref 1.4–6.5)
NEUTROPHILS NFR BLD AUTO: 76.2 % — HIGH (ref 42.2–75.2)
NRBC # BLD: 0 /100 WBCS — SIGNIFICANT CHANGE UP (ref 0–0)
PLATELET # BLD AUTO: 255 K/UL — SIGNIFICANT CHANGE UP (ref 130–400)
PMV BLD: 12 FL — HIGH (ref 7.4–10.4)
POTASSIUM SERPL-MCNC: 3.6 MMOL/L — SIGNIFICANT CHANGE UP (ref 3.5–5)
POTASSIUM SERPL-MCNC: 4.1 MMOL/L — SIGNIFICANT CHANGE UP (ref 3.5–5)
POTASSIUM SERPL-SCNC: 3.6 MMOL/L — SIGNIFICANT CHANGE UP (ref 3.5–5)
POTASSIUM SERPL-SCNC: 4.1 MMOL/L — SIGNIFICANT CHANGE UP (ref 3.5–5)
PROT SERPL-MCNC: 5.8 G/DL — LOW (ref 6–8)
RBC # BLD: 3.2 M/UL — LOW (ref 4.2–5.4)
RBC # FLD: 12.7 % — SIGNIFICANT CHANGE UP (ref 11.5–14.5)
SODIUM SERPL-SCNC: 150 MMOL/L — HIGH (ref 135–146)
SODIUM SERPL-SCNC: 150 MMOL/L — HIGH (ref 135–146)
SPECIMEN SOURCE: SIGNIFICANT CHANGE UP
WBC # BLD: 11.64 K/UL — HIGH (ref 4.8–10.8)
WBC # FLD AUTO: 11.64 K/UL — HIGH (ref 4.8–10.8)

## 2024-11-14 PROCEDURE — 71045 X-RAY EXAM CHEST 1 VIEW: CPT | Mod: 26

## 2024-11-14 PROCEDURE — 71045 X-RAY EXAM CHEST 1 VIEW: CPT | Mod: 26,77

## 2024-11-14 PROCEDURE — 99291 CRITICAL CARE FIRST HOUR: CPT

## 2024-11-14 RX ORDER — ALBUTEROL 90 MCG
1 AEROSOL (GRAM) INHALATION EVERY 4 HOURS
Refills: 0 | Status: DISCONTINUED | OUTPATIENT
Start: 2024-11-14 | End: 2024-11-19

## 2024-11-14 RX ORDER — SODIUM CHLORIDE 9 MG/ML
3 INJECTION, SOLUTION INTRAMUSCULAR; INTRAVENOUS; SUBCUTANEOUS DAILY
Refills: 0 | Status: DISCONTINUED | OUTPATIENT
Start: 2024-11-14 | End: 2024-11-19

## 2024-11-14 RX ORDER — SODIUM CHLORIDE 9 MG/ML
4 INJECTION, SOLUTION INTRAMUSCULAR; INTRAVENOUS; SUBCUTANEOUS EVERY 12 HOURS
Refills: 0 | Status: DISCONTINUED | OUTPATIENT
Start: 2024-11-14 | End: 2024-11-19

## 2024-11-14 RX ORDER — 0.9 % SODIUM CHLORIDE 0.9 %
1000 INTRAVENOUS SOLUTION INTRAVENOUS
Refills: 0 | Status: DISCONTINUED | OUTPATIENT
Start: 2024-11-14 | End: 2024-11-15

## 2024-11-14 RX ORDER — ALBUTEROL 90 MCG
2.5 AEROSOL (GRAM) INHALATION EVERY 6 HOURS
Refills: 0 | Status: DISCONTINUED | OUTPATIENT
Start: 2024-11-14 | End: 2024-11-19

## 2024-11-14 RX ADMIN — ENOXAPARIN SODIUM 90 MILLIGRAM(S): 30 INJECTION SUBCUTANEOUS at 11:00

## 2024-11-14 RX ADMIN — PANTOPRAZOLE SODIUM 40 MILLIGRAM(S): 40 TABLET, DELAYED RELEASE ORAL at 05:19

## 2024-11-14 RX ADMIN — Medication 2.5 MILLIGRAM(S): at 20:21

## 2024-11-14 RX ADMIN — Medication 2.5 MILLIGRAM(S): at 13:58

## 2024-11-14 RX ADMIN — NYSTATIN 1 APPLICATION(S): 100000 POWDER TOPICAL at 05:20

## 2024-11-14 RX ADMIN — PANTOPRAZOLE SODIUM 40 MILLIGRAM(S): 40 TABLET, DELAYED RELEASE ORAL at 17:21

## 2024-11-14 RX ADMIN — CHLORHEXIDINE GLUCONATE 1 APPLICATION(S): 1.2 RINSE ORAL at 05:19

## 2024-11-14 RX ADMIN — NYSTATIN 1 APPLICATION(S): 100000 POWDER TOPICAL at 17:21

## 2024-11-14 RX ADMIN — SODIUM CHLORIDE 4 MILLILITER(S): 9 INJECTION, SOLUTION INTRAMUSCULAR; INTRAVENOUS; SUBCUTANEOUS at 20:45

## 2024-11-14 RX ADMIN — ENOXAPARIN SODIUM 90 MILLIGRAM(S): 30 INJECTION SUBCUTANEOUS at 22:38

## 2024-11-14 RX ADMIN — Medication 150 MILLILITER(S): at 23:12

## 2024-11-14 NOTE — PROGRESS NOTE ADULT - SUBJECTIVE AND OBJECTIVE BOX
Over Night Events: events noted, remains critically ill, ID noted    PHYSICAL EXAM    ICU Vital Signs Last 24 Hrs  T(C): 36.6 (14 Nov 2024 00:00), Max: 37.5 (13 Nov 2024 08:00)  T(F): 97.9 (14 Nov 2024 00:00), Max: 99.5 (13 Nov 2024 08:00)  HR: 85 (14 Nov 2024 06:00) (64 - 97)  BP: 151/66 (14 Nov 2024 06:00) (114/56 - 178/73)  BP(mean): 95 (14 Nov 2024 06:00) (80 - 108)  RR: 32 (14 Nov 2024 06:00) (25 - 48)  SpO2: 100% (14 Nov 2024 06:00) (79% - 100%)    O2 Parameters below as of 14 Nov 2024 04:00  Patient On (Oxygen Delivery Method): BiPAP/CPAP            General: ill looking  Lungs: Bilateral rhonchi  Cardiovascular: EARLENE 2.6  Abdomen: Soft, Positive BS  Extremities: No clubbing   Neurological: Non focal       11-12-24 @ 07:01  -  11-13-24 @ 07:00  --------------------------------------------------------  IN:    Dexmedetomidine: 126.8 mL    Enteral Tube Flush: 30 mL    Free Water: 1250 mL    IV PiggyBack: 300 mL    Jevity 1.2: 1040 mL  Total IN: 2746.8 mL    OUT:    Rectal Tube (mL): 400 mL    Voided (mL): 2050 mL  Total OUT: 2450 mL    Total NET: 296.8 mL      11-13-24 @ 07:01  -  11-14-24 @ 06:58  --------------------------------------------------------  IN:    Dexmedetomidine: 10.3 mL  Total IN: 10.3 mL    OUT:    Rectal Tube (mL): 25 mL    Voided (mL): 2550 mL  Total OUT: 2575 mL    Total NET: -2564.7 mL          LABS:                          9.2    11.64 )-----------( 255      ( 14 Nov 2024 04:18 )             31.1                                               11-14    150[H]  |  107  |  21[H]  ----------------------------<  104[H]  4.1   |  33[H]  |  0.8    Ca    10.4      14 Nov 2024 04:18  Phos  1.5     11-13  Mg     2.0     11-14    TPro  5.8[L]  /  Alb  3.1[L]  /  TBili  0.7  /  DBili  x   /  AST  12  /  ALT  9   /  AlkPhos  153[H]  11-14      PT/INR - ( 13 Nov 2024 04:28 )   PT: 11.80 sec;   INR: 1.00 ratio         PTT - ( 13 Nov 2024 04:28 )  PTT:47.4 sec                                       Urinalysis Basic - ( 14 Nov 2024 04:18 )    Color: x / Appearance: x / SG: x / pH: x  Gluc: 104 mg/dL / Ketone: x  / Bili: x / Urobili: x   Blood: x / Protein: x / Nitrite: x   Leuk Esterase: x / RBC: x / WBC x   Sq Epi: x / Non Sq Epi: x / Bacteria: x                                                  LIVER FUNCTIONS - ( 14 Nov 2024 04:18 )  Alb: 3.1 g/dL / Pro: 5.8 g/dL / ALK PHOS: 153 U/L / ALT: 9 U/L / AST: 12 U/L / GGT: x                                                  Culture - Fungal, Bronchial (collected 12 Nov 2024 09:01)  Source: Bronchial  Preliminary Report (13 Nov 2024 23:03):    Culture is being performed. Fungal cultures are held for 4 weeks.    Culture - Acid Fast - Bronchial w/Smear (collected 12 Nov 2024 09:01)  Source: Bronch Wash  Preliminary Report (13 Nov 2024 23:07):    Culture is being performed.    Culture - Bronchial (collected 12 Nov 2024 09:01)  Source: Bronch Wash  Gram Stain (12 Nov 2024 20:33):    No Squamous epithelial cells seen per low power field    Rare polymorphonuclear leukocytes seen per low power field    No organisms seen per oil power field  Preliminary Report (13 Nov 2024 12:53):    No growth to date                                                   Mode: CPAP with PS  FiO2: 40  PEEP: 8  PS: 10  MAP: 11  PIP: 17                                      ABG - ( 13 Nov 2024 17:14 )  pH, Arterial: 7.56  pH, Blood: x     /  pCO2: 41    /  pO2: 53    / HCO3: 37    / Base Excess: 13.3  /  SaO2: 88.0                MEDICATIONS  (STANDING):  amLODIPine   Tablet 10 milliGRAM(s) Oral daily  chlorhexidine 2% Cloths 1 Application(s) Topical <User Schedule>  dexMEDEtomidine Infusion 0.5 MICROgram(s)/kG/Hr (11.5 mL/Hr) IV Continuous <Continuous>  enoxaparin Injectable 90 milliGRAM(s) SubCutaneous every 12 hours  losartan 50 milliGRAM(s) Oral daily  nystatin Cream 1 Application(s) Topical two times a day  pantoprazole  Injectable 40 milliGRAM(s) IV Push two times a day    MEDICATIONS  (PRN):  acetaminophen     Tablet .. 650 milliGRAM(s) Oral every 6 hours PRN Temp greater or equal to 38C (100.4F)       Over Night Events: events noted, remains critically ill, ID noted, on NRM    PHYSICAL EXAM    ICU Vital Signs Last 24 Hrs  T(C): 36.6 (14 Nov 2024 00:00), Max: 37.5 (13 Nov 2024 08:00)  T(F): 97.9 (14 Nov 2024 00:00), Max: 99.5 (13 Nov 2024 08:00)  HR: 85 (14 Nov 2024 06:00) (64 - 97)  BP: 151/66 (14 Nov 2024 06:00) (114/56 - 178/73)  BP(mean): 95 (14 Nov 2024 06:00) (80 - 108)  RR: 32 (14 Nov 2024 06:00) (25 - 48)  SpO2: 100% (14 Nov 2024 06:00) (79% - 100%)    O2 Parameters below as of 14 Nov 2024 04:00  Patient On (Oxygen Delivery Method): BiPAP/CPAP            General: ill looking  Lungs: Bilateral rhonchi  Cardiovascular: EARLENE 2.6  Abdomen: Soft, Positive BS  Extremities: No clubbing   Neurological: Non focal       11-12-24 @ 07:01  -  11-13-24 @ 07:00  --------------------------------------------------------  IN:    Dexmedetomidine: 126.8 mL    Enteral Tube Flush: 30 mL    Free Water: 1250 mL    IV PiggyBack: 300 mL    Jevity 1.2: 1040 mL  Total IN: 2746.8 mL    OUT:    Rectal Tube (mL): 400 mL    Voided (mL): 2050 mL  Total OUT: 2450 mL    Total NET: 296.8 mL      11-13-24 @ 07:01  -  11-14-24 @ 06:58  --------------------------------------------------------  IN:    Dexmedetomidine: 10.3 mL  Total IN: 10.3 mL    OUT:    Rectal Tube (mL): 25 mL    Voided (mL): 2550 mL  Total OUT: 2575 mL    Total NET: -2564.7 mL          LABS:                          9.2    11.64 )-----------( 255      ( 14 Nov 2024 04:18 )             31.1                                               11-14    150[H]  |  107  |  21[H]  ----------------------------<  104[H]  4.1   |  33[H]  |  0.8    Ca    10.4      14 Nov 2024 04:18  Phos  1.5     11-13  Mg     2.0     11-14    TPro  5.8[L]  /  Alb  3.1[L]  /  TBili  0.7  /  DBili  x   /  AST  12  /  ALT  9   /  AlkPhos  153[H]  11-14      PT/INR - ( 13 Nov 2024 04:28 )   PT: 11.80 sec;   INR: 1.00 ratio         PTT - ( 13 Nov 2024 04:28 )  PTT:47.4 sec                                       Urinalysis Basic - ( 14 Nov 2024 04:18 )    Color: x / Appearance: x / SG: x / pH: x  Gluc: 104 mg/dL / Ketone: x  / Bili: x / Urobili: x   Blood: x / Protein: x / Nitrite: x   Leuk Esterase: x / RBC: x / WBC x   Sq Epi: x / Non Sq Epi: x / Bacteria: x                                                  LIVER FUNCTIONS - ( 14 Nov 2024 04:18 )  Alb: 3.1 g/dL / Pro: 5.8 g/dL / ALK PHOS: 153 U/L / ALT: 9 U/L / AST: 12 U/L / GGT: x                                                  Culture - Fungal, Bronchial (collected 12 Nov 2024 09:01)  Source: Bronchial  Preliminary Report (13 Nov 2024 23:03):    Culture is being performed. Fungal cultures are held for 4 weeks.    Culture - Acid Fast - Bronchial w/Smear (collected 12 Nov 2024 09:01)  Source: Bronch Wash  Preliminary Report (13 Nov 2024 23:07):    Culture is being performed.    Culture - Bronchial (collected 12 Nov 2024 09:01)  Source: Bronch Wash  Gram Stain (12 Nov 2024 20:33):    No Squamous epithelial cells seen per low power field    Rare polymorphonuclear leukocytes seen per low power field    No organisms seen per oil power field  Preliminary Report (13 Nov 2024 12:53):    No growth to date                                                   Mode: CPAP with PS  FiO2: 40  PEEP: 8  PS: 10  MAP: 11  PIP: 17                                      ABG - ( 13 Nov 2024 17:14 )  pH, Arterial: 7.56  pH, Blood: x     /  pCO2: 41    /  pO2: 53    / HCO3: 37    / Base Excess: 13.3  /  SaO2: 88.0                MEDICATIONS  (STANDING):  amLODIPine   Tablet 10 milliGRAM(s) Oral daily  chlorhexidine 2% Cloths 1 Application(s) Topical <User Schedule>  dexMEDEtomidine Infusion 0.5 MICROgram(s)/kG/Hr (11.5 mL/Hr) IV Continuous <Continuous>  enoxaparin Injectable 90 milliGRAM(s) SubCutaneous every 12 hours  losartan 50 milliGRAM(s) Oral daily  nystatin Cream 1 Application(s) Topical two times a day  pantoprazole  Injectable 40 milliGRAM(s) IV Push two times a day    MEDICATIONS  (PRN):  acetaminophen     Tablet .. 650 milliGRAM(s) Oral every 6 hours PRN Temp greater or equal to 38C (100.4F)

## 2024-11-14 NOTE — SWALLOW FEES ASSESSMENT ADULT - SLP GENERAL OBSERVATIONS
pt awake generally alert no c/o pain. pt on 50L/60% o2 via HFNC. Dysphonic vocal quality with low intensity and dysarthria.

## 2024-11-14 NOTE — PROGRESS NOTE ADULT - ASSESSMENT
IMPRESSION:    Acute hypoxemic respiratory failure   Septic shock - resolved  UTI  Suspected colon mass  Severe gastric distension w/ pneumatosis along posterior gastric wall  Possible aspiration / LLL consolidation  Intraperitoneal free air  Thrombocytopenia   hypernatremia  gastric ulcer   HAGMA  NOLA non oliguric resolved  Afib on Xarelto  HO HTN  HO ESBL Proteus    PLAN:    CNS: AVOID CNS depressant    HEENT: Oral care. ET care     PULMONARY:  HOB @ 45 degrees.  Aspiration precautions. keep Sao2 92 to 96%, chest PT    CARDIOVASCULAR: TTE. Mod AS, D5 W    GI: GI prophylaxis.  Feeding    RENAL:  Follow up lytes. Correct as needed.      INFECTIOUS DISEASE: SP ABX    HEMATOLOGICAL:  Resume AC. Monitor CBC and coags.     ENDOCRINE:  Follow up FS. Insulin protocol if needed.    MUSCULOSKELETAL: Bed chair position.  Off loading    Full code    MICU   IMPRESSION:    Acute hypoxemic respiratory failure   Septic shock - resolved  UTI  Suspected colon mass  Severe gastric distension w/ pneumatosis along posterior gastric wall  Possible aspiration / LLL consolidation  Intraperitoneal free air  Thrombocytopenia   hypernatremia  gastric ulcer   HAGMA  NOLA non oliguric resolved  Afib on Xarelto  HO HTN  HO ESBL Proteus    PLAN:    CNS: AVOID CNS depressant    HEENT: Oral care.     PULMONARY:  HOB @ 45 degrees.  Aspiration precautions. keep Sao2 92 to 96%, chest PT    CARDIOVASCULAR: TTE. Mod AS, D5 W    GI: GI prophylaxis.  Speech and swallow eval    RENAL:  Follow up lytes. Correct as needed.      INFECTIOUS DISEASE: SP ABX    HEMATOLOGICAL:  Resume AC. Monitor CBC and coags.     ENDOCRINE:  Follow up FS. Insulin protocol if needed.    MUSCULOSKELETAL: Bed chair position.  Off loading    Full code    MICU

## 2024-11-14 NOTE — PROGRESS NOTE ADULT - ASSESSMENT
73-year-old female PMH A-fib on xarelto,, HTN, s/p cholecystectomy, congenital solitary kidney presents from NH to the ED for evaluation of weakness, decreased PO intake and abdominal distension.     Admitted to ICU for septic shock 2/2 ESBL proteus (UTI vs. GI source) complicated by gastric distension w/ pneumatosis, NOLA 2/2 ATN, hypernatremia. s/p NGT decompression (-3.1 L feculent output) in ED. Intubated 11/6 for AHRF. EGD 11/8 w/ multipl ulcers, largest 10cm) pending pathology. Now with new R colon/cecal mass pending colonoscopy when more stable.   Extubated 11/13 to AVAPs. Now on HFNC w/ chest PT    #AHRF 2/2 septic shock due to ESBL proteus (UTI Vs. GI source) - improving  #HAGMA 2/2 lactic acidosis and uremia - resolved   #NOLA likely ATN 2/2 septic shock - resolved   #Possible aspiration  #Congenital solitary kidney  - Intubated 11/6, extubated 11/13 to AVAPS  - off pressors, wean precedex   - UCx +ESBL proteus, BCX (-), trach culture with resp rosalva, sputum cx 11/12 resp rosalva  - c/w freddie 1g q8h  - start HFNC, chest PT/suctioning/pulmonary toilet    #Dysphagia 2/2 critical illness  - S&S eval  - NPO    #Hypernatremia  - d5 @150cc/hr  - BMP q12h    #Abdominal distension w/ pneumatosis   #R colon/cecal mass on CT A/P  #Diarrhea   - NGT placed for decompression in ED with 3.1L feculent output   - GI recs, surgery recs, vascular recs appreciated   - s/p EGD 11/8 with multiple ulcers, erosive gastritis  - EGD path: chronic gastritis (-) Hpylori, no malignancy   - CT A/P with PO contrast w/ suspicious hepatic flexure narrowing and lobulated soft tissue density in R colon/cecum.   - TSH wnl  - IV PPI BID   - colonoscopy when more stable as per GI    #Sacral DTI  - offloading, wound care, pressure injury prevention    #Afib  - hold xarelto   - c/w tx lovenox 1mg/kg q12h    #HTN  - amlodipine 10mg qd PO, losartan 50mg qd PO home meds (holding as NPO pending S&S)    ---------------------  DVT ppx: tx lovenox   Diet: NPO pending S&S  GI ppx/Bowel regimen: PPI BID  Activity: bedrest  GOC: full code  Dispo: ICU  #Summary/Handoff:  - HFNC w/ chest PT, S&S eval, BMP@4pm for Na

## 2024-11-14 NOTE — PROGRESS NOTE ADULT - SUBJECTIVE AND OBJECTIVE BOX
Patient is a 73y old Female who presents with a chief complaint of abd pain (11 Nov 2024 08:22)    Overnight events/Interval History:  - On AVAPS overnight   - At bedside, patient answers simple questions, following commands. awake. Denies pain or respiratory distress.      ROS:  - unable 2/2 mental status     Code Status: full code    ALLERGIES:  No Known Allergies    MEDICATIONS:  STANDING MEDICATIONS  albuterol    0.083% 2.5 milliGRAM(s) Nebulizer every 6 hours  albuterol    90 MICROgram(s) HFA Inhaler 1 Puff(s) Inhalation every 4 hours  amLODIPine   Tablet 10 milliGRAM(s) Oral daily  chlorhexidine 2% Cloths 1 Application(s) Topical <User Schedule>  dexMEDEtomidine Infusion 0.5 MICROgram(s)/kG/Hr IV Continuous <Continuous>  dextrose 5%. 1000 milliLiter(s) IV Continuous <Continuous>  enoxaparin Injectable 90 milliGRAM(s) SubCutaneous every 12 hours  losartan 50 milliGRAM(s) Oral daily  nystatin Cream 1 Application(s) Topical two times a day  pantoprazole  Injectable 40 milliGRAM(s) IV Push two times a day  sodium chloride 3%  Inhalation 4 milliLiter(s) Inhalation every 12 hours    PRN MEDICATIONS  acetaminophen     Tablet .. 650 milliGRAM(s) Oral every 6 hours PRN    ICU Vital Signs Last 24 Hrs  T(C): 37.8 (14 Nov 2024 08:00), Max: 37.8 (14 Nov 2024 08:00)  T(F): 100 (14 Nov 2024 08:00), Max: 100 (14 Nov 2024 08:00)  HR: 89 (14 Nov 2024 09:00) (73 - 97)  BP: 145/65 (14 Nov 2024 09:00) (114/56 - 178/73)  BP(mean): 93 (14 Nov 2024 09:00) (80 - 105)  ABP: --  ABP(mean): --  RR: 32 (14 Nov 2024 09:00) (25 - 44)  SpO2: 99% (14 Nov 2024 09:00) (79% - 100%)    O2 Parameters below as of 14 Nov 2024 09:00  Patient On (Oxygen Delivery Method): nasal cannula, high flow    PHYSICAL EXAM:  GENERAL: NAD  HEENT:  dry mucous membranes  CHEST/LUNG: decreased BS on L; normal respiratory effort  HEART: Regular rate and rhythm  ABDOMEN: Soft, nontender, mildly distended   EXTREMITIES:  No lower extremity edema bilaterally  NERVOUS SYSTEM: awake, tracking, following commands     LABS:                        9.2    11.64 )-----------( 255      ( 14 Nov 2024 04:18 )             31.1     11-14    150[H]  |  107  |  21[H]  ----------------------------<  104[H]  4.1   |  33[H]  |  0.8    Ca    10.4      14 Nov 2024 04:18  Phos  1.5     11-13  Mg     2.0     11-14    TPro  5.8[L]  /  Alb  3.1[L]  /  TBili  0.7  /  DBili  x   /  AST  12  /  ALT  9   /  AlkPhos  153[H]  11-14    PT/INR - ( 13 Nov 2024 04:28 )   PT: 11.80 sec;   INR: 1.00 ratio         PTT - ( 13 Nov 2024 04:28 )  PTT:47.4 sec  Urinalysis Basic - ( 14 Nov 2024 04:18 )    Color: x / Appearance: x / SG: x / pH: x  Gluc: 104 mg/dL / Ketone: x  / Bili: x / Urobili: x   Blood: x / Protein: x / Nitrite: x   Leuk Esterase: x / RBC: x / WBC x   Sq Epi: x / Non Sq Epi: x / Bacteria: x      ABG - ( 13 Nov 2024 17:14 )  pH, Arterial: 7.56  pH, Blood: x     /  pCO2: 41    /  pO2: 53    / HCO3: 37    / Base Excess: 13.3  /  SaO2: 88.0                  Culture - Fungal, Bronchial (collected 12 Nov 2024 09:01)  Source: Bronchial  Preliminary Report (13 Nov 2024 23:03):    Culture is being performed. Fungal cultures are held for 4 weeks.    Culture - Acid Fast - Bronchial w/Smear (collected 12 Nov 2024 09:01)  Source: Bronch Wash  Preliminary Report (13 Nov 2024 23:07):    Culture is being performed.    Culture - Bronchial (collected 12 Nov 2024 09:01)  Source: Bronch Wash  Gram Stain (12 Nov 2024 20:33):    No Squamous epithelial cells seen per low power field    Rare polymorphonuclear leukocytes seen per low power field    No organisms seen per oil power field  Final Report (14 Nov 2024 09:38):    Commensal rosalva consistent with body site          RADIOLOGY:  CXR  Xray Chest 1 View- PORTABLE-Urgent:   ACC: [id number] EXAM:  XR CHEST PORTABLE URGENT 1V   ORDERED BY: DANIELLE DUONG     PROCEDURE DATE:  11/14/2024          INTERPRETATION:  CLINICAL HISTORY: Respiratory failure.    COMPARISON: Chest radiograph 11/14/2024.    TECHNIQUE: Frontal chestradiograph.    FINDINGS:    Support devices: None.    Cardiac/mediastinum/hilum: Obscured.    Lung parenchyma/Pleura: Near complete opacification of the left   hemithorax, increased from prior. No pneumothorax.    Skeleton/soft tissues: Unchanged.      IMPRESSION:    Near complete opacification of the left hemithorax, increased from prior.    --- End of Report ---          FLOWER STEVENS MD; Resident Radiologist  This document has been electronically signed.  PARK TA MD; Attending Radiologist  This document has been electronically signed. Nov 14 2024  9:20AM (11-14-24 @ 08:48)  Xray Chest 1 View- PORTABLE-Urgent:   ACC: [id number] EXAM:  XR CHEST PORTABLE URGENT 1V   ORDERED BY: IRLANDA VILLALPANDO     PROCEDURE DATE:  11/14/2024          INTERPRETATION:  CLINICAL HISTORY: Emesis with BiPAP.    COMPARISON: Chest radiograph 11/14/2024 at 5:00 AM.    TECHNIQUE: Frontal chest radiograph.    FINDINGS:    Support devices: None.    Cardiac/mediastinum/hilum: Unchanged.    Lung parenchyma/Pleura: Essentially stable bilateral opacities/effusion.   No pneumothorax.    Skeleton/soft tissues: Unchanged.      IMPRESSION:    Essentially stable bilateral opacities/effusion.    --- End of Report ---            PARK TA MD; Attending Radiologist  This document has been electronically signed. Nov 14 2024  9:03AM (11-14-24 @ 07:55)  Xray Chest 1 View- PORTABLE-Urgent:   ACC: [id number] EXAM:  XR CHEST PORTABLE URGENT 1V   ORDERED BY: IRLANDA VILLALPANDO     PROCEDURE DATE:  11/12/2024          INTERPRETATION:  CLINICAL HISTORY: NGT Placement.    COMPARISON: Chest radiograph 11/12/2024 at 9:25 AM.    TECHNIQUE: Frontal chest radiograph.    FINDINGS:    Support devices: ET tube terminates 5.6 cm of the tor. Enteric tube   courses below the left hemidiaphragm.    Cardiac/mediastinum/hilum: Unchanged.    Lung parenchyma/Pleura: Essentially stable small left pleural effusion.   No pneumothorax.    Skeleton/soft tissues: Unchanged.      IMPRESSION:    Support devices as described.    Essentially stable small left pleural effusion.    --- End of Report ---            PARK TA MD; Attending Radiologist  This document has been electronically signed. Nov 12 2024  9:30PM (11-12-24 @ 21:41)  Xray Chest 1 View- PORTABLE-Urgent:   ACC: [id number] EXAM:  XR CHEST PORTABLE URGENT 1V   ORDERED BY: DANIELLE DUONG     *** ADDENDUM # 1 ***    Dictation error, impression should read    Impression:    Bibasilar opacities/effusions similar to prior.    --- End of Report ---    *** END OF ADDENDUM # 1 ***      PROCEDURE DATE:  11/12/2024          INTERPRETATION:  Clinical History: s/p Bronchoscopy.    Comparison : Chest radiograph 11/12/2024.    Technique/Positioning: Frontal chest radiograph.    Findings:    Support devices: Endotracheal tube in satisfactory position. Feeding tube   coursing into abdomen.    Cardiac/mediastinum/hilum: Unchanged.    Lung parenchyma/Pleura: Bibasilar opacities/effusions similar to prior.   No definite pneumothorax.    Skeleton/soft tissues: Unchanged    Impression:    No focal pulmonary consolidation        --- End of Report ---    ***Please see the addendum at the top of this report. It may contain   additional important information or changes.****        CHRISTINE ELLINGTON MD; Attending Radiologist  This document has been electronically signed. Nov 12 2024 12:07PM  1st Addendum: CHRISTINE ELLINGTON MD; Attending Radiologist  The first addendum was electronically signed on: Nov 13 2024  9:06AM. (11-12-24 @ 09:54)      CT

## 2024-11-15 DIAGNOSIS — Z51.5 ENCOUNTER FOR PALLIATIVE CARE: ICD-10-CM

## 2024-11-15 DIAGNOSIS — J96.01 ACUTE RESPIRATORY FAILURE WITH HYPOXIA: ICD-10-CM

## 2024-11-15 DIAGNOSIS — R13.10 DYSPHAGIA, UNSPECIFIED: ICD-10-CM

## 2024-11-15 DIAGNOSIS — J69.0 PNEUMONITIS DUE TO INHALATION OF FOOD AND VOMIT: ICD-10-CM

## 2024-11-15 LAB
ALBUMIN SERPL ELPH-MCNC: 3.1 G/DL — LOW (ref 3.5–5.2)
ALP SERPL-CCNC: 139 U/L — HIGH (ref 30–115)
ALT FLD-CCNC: 10 U/L — SIGNIFICANT CHANGE UP (ref 0–41)
ANION GAP SERPL CALC-SCNC: 8 MMOL/L — SIGNIFICANT CHANGE UP (ref 7–14)
AST SERPL-CCNC: 13 U/L — SIGNIFICANT CHANGE UP (ref 0–41)
BASOPHILS # BLD AUTO: 0.09 K/UL — SIGNIFICANT CHANGE UP (ref 0–0.2)
BASOPHILS NFR BLD AUTO: 0.6 % — SIGNIFICANT CHANGE UP (ref 0–1)
BILIRUB SERPL-MCNC: 0.7 MG/DL — SIGNIFICANT CHANGE UP (ref 0.2–1.2)
BUN SERPL-MCNC: 18 MG/DL — SIGNIFICANT CHANGE UP (ref 10–20)
CALCIUM SERPL-MCNC: 10.2 MG/DL — SIGNIFICANT CHANGE UP (ref 8.4–10.4)
CHLORIDE SERPL-SCNC: 103 MMOL/L — SIGNIFICANT CHANGE UP (ref 98–110)
CO2 SERPL-SCNC: 32 MMOL/L — SIGNIFICANT CHANGE UP (ref 17–32)
CREAT SERPL-MCNC: 0.8 MG/DL — SIGNIFICANT CHANGE UP (ref 0.7–1.5)
EGFR: 79 ML/MIN/1.73M2 — SIGNIFICANT CHANGE UP
EOSINOPHIL # BLD AUTO: 0.29 K/UL — SIGNIFICANT CHANGE UP (ref 0–0.7)
EOSINOPHIL NFR BLD AUTO: 1.8 % — SIGNIFICANT CHANGE UP (ref 0–8)
GLUCOSE SERPL-MCNC: 136 MG/DL — HIGH (ref 70–99)
HCT VFR BLD CALC: 30.9 % — LOW (ref 37–47)
HGB BLD-MCNC: 9.5 G/DL — LOW (ref 12–16)
IMM GRANULOCYTES NFR BLD AUTO: 0.6 % — HIGH (ref 0.1–0.3)
LYMPHOCYTES # BLD AUTO: 1.91 K/UL — SIGNIFICANT CHANGE UP (ref 1.2–3.4)
LYMPHOCYTES # BLD AUTO: 12.1 % — LOW (ref 20.5–51.1)
MAGNESIUM SERPL-MCNC: 1.8 MG/DL — SIGNIFICANT CHANGE UP (ref 1.8–2.4)
MCHC RBC-ENTMCNC: 29.7 PG — SIGNIFICANT CHANGE UP (ref 27–31)
MCHC RBC-ENTMCNC: 30.7 G/DL — LOW (ref 32–37)
MCV RBC AUTO: 96.6 FL — SIGNIFICANT CHANGE UP (ref 81–99)
MONOCYTES # BLD AUTO: 1.25 K/UL — HIGH (ref 0.1–0.6)
MONOCYTES NFR BLD AUTO: 7.9 % — SIGNIFICANT CHANGE UP (ref 1.7–9.3)
NEUTROPHILS # BLD AUTO: 12.2 K/UL — HIGH (ref 1.4–6.5)
NEUTROPHILS NFR BLD AUTO: 77 % — HIGH (ref 42.2–75.2)
NRBC # BLD: 0 /100 WBCS — SIGNIFICANT CHANGE UP (ref 0–0)
PHOSPHATE SERPL-MCNC: 2.3 MG/DL — SIGNIFICANT CHANGE UP (ref 2.1–4.9)
PLATELET # BLD AUTO: 284 K/UL — SIGNIFICANT CHANGE UP (ref 130–400)
PMV BLD: 12.2 FL — HIGH (ref 7.4–10.4)
POTASSIUM SERPL-MCNC: 3.3 MMOL/L — LOW (ref 3.5–5)
POTASSIUM SERPL-SCNC: 3.3 MMOL/L — LOW (ref 3.5–5)
PROT SERPL-MCNC: 5.9 G/DL — LOW (ref 6–8)
RBC # BLD: 3.2 M/UL — LOW (ref 4.2–5.4)
RBC # FLD: 12.5 % — SIGNIFICANT CHANGE UP (ref 11.5–14.5)
SODIUM SERPL-SCNC: 143 MMOL/L — SIGNIFICANT CHANGE UP (ref 135–146)
WBC # BLD: 15.84 K/UL — HIGH (ref 4.8–10.8)
WBC # FLD AUTO: 15.84 K/UL — HIGH (ref 4.8–10.8)

## 2024-11-15 PROCEDURE — 99233 SBSQ HOSP IP/OBS HIGH 50: CPT

## 2024-11-15 PROCEDURE — 71045 X-RAY EXAM CHEST 1 VIEW: CPT | Mod: 26

## 2024-11-15 PROCEDURE — 99223 1ST HOSP IP/OBS HIGH 75: CPT

## 2024-11-15 RX ORDER — 0.9 % SODIUM CHLORIDE 0.9 %
1000 INTRAVENOUS SOLUTION INTRAVENOUS
Refills: 0 | Status: DISCONTINUED | OUTPATIENT
Start: 2024-11-15 | End: 2024-11-15

## 2024-11-15 RX ORDER — 0.9 % SODIUM CHLORIDE 0.9 %
1000 INTRAVENOUS SOLUTION INTRAVENOUS
Refills: 0 | Status: DISCONTINUED | OUTPATIENT
Start: 2024-11-15 | End: 2024-11-17

## 2024-11-15 RX ORDER — POTASSIUM CHLORIDE 600 MG/1
20 TABLET, EXTENDED RELEASE ORAL
Refills: 0 | Status: COMPLETED | OUTPATIENT
Start: 2024-11-15 | End: 2024-11-15

## 2024-11-15 RX ADMIN — Medication 100 MILLILITER(S): at 21:53

## 2024-11-15 RX ADMIN — PANTOPRAZOLE SODIUM 40 MILLIGRAM(S): 40 TABLET, DELAYED RELEASE ORAL at 05:48

## 2024-11-15 RX ADMIN — CHLORHEXIDINE GLUCONATE 1 APPLICATION(S): 1.2 RINSE ORAL at 05:52

## 2024-11-15 RX ADMIN — Medication 2.5 MILLIGRAM(S): at 01:37

## 2024-11-15 RX ADMIN — SODIUM CHLORIDE 4 MILLILITER(S): 9 INJECTION, SOLUTION INTRAMUSCULAR; INTRAVENOUS; SUBCUTANEOUS at 21:02

## 2024-11-15 RX ADMIN — SODIUM CHLORIDE 4 MILLILITER(S): 9 INJECTION, SOLUTION INTRAMUSCULAR; INTRAVENOUS; SUBCUTANEOUS at 13:49

## 2024-11-15 RX ADMIN — POTASSIUM CHLORIDE 50 MILLIEQUIVALENT(S): 600 TABLET, EXTENDED RELEASE ORAL at 09:41

## 2024-11-15 RX ADMIN — ENOXAPARIN SODIUM 90 MILLIGRAM(S): 30 INJECTION SUBCUTANEOUS at 21:52

## 2024-11-15 RX ADMIN — Medication 2.5 MILLIGRAM(S): at 21:02

## 2024-11-15 RX ADMIN — ENOXAPARIN SODIUM 90 MILLIGRAM(S): 30 INJECTION SUBCUTANEOUS at 09:41

## 2024-11-15 RX ADMIN — NYSTATIN 1 APPLICATION(S): 100000 POWDER TOPICAL at 17:23

## 2024-11-15 RX ADMIN — PANTOPRAZOLE SODIUM 40 MILLIGRAM(S): 40 TABLET, DELAYED RELEASE ORAL at 17:24

## 2024-11-15 RX ADMIN — NYSTATIN 1 APPLICATION(S): 100000 POWDER TOPICAL at 05:52

## 2024-11-15 RX ADMIN — POTASSIUM CHLORIDE 50 MILLIEQUIVALENT(S): 600 TABLET, EXTENDED RELEASE ORAL at 08:16

## 2024-11-15 NOTE — SWALLOW FEES ASSESSMENT ADULT - RECOMMENDED CONSISTENCY
NPO with alternate means of nutrition/hydration;  IF there is no functional means of providing alternative nutrition/hydration and plan is to pursue comfort feeding then Puree with moderately thick liquids via alternating bite/sip would be the safest
NPO with alternate means of nutrition/hydration except ice chips

## 2024-11-15 NOTE — SWALLOW FEES ASSESSMENT ADULT - SLP GENERAL OBSERVATIONS
pt awake generally alert with fixed right head posture (this is baseline for pt). Pt on 40L/50% o2 via HFNC. Audible secretions noted-pt with now an improved cough reflex and some production hear. No c/o pain. + dysarthria

## 2024-11-15 NOTE — SWALLOW BEDSIDE ASSESSMENT ADULT - SLP GENERAL OBSERVATIONS
pt with improved vocal quality today; however, intermittently responsive due to cognitive status; pt on HFNC 50L 49%
pt awake alert without c/o pain. significantly dry intra-oral cavity. Oral care provided. pt on 50L/60% o2 via HFNC. low weak vocal intensity with dysphonia

## 2024-11-15 NOTE — SWALLOW FEES ASSESSMENT ADULT - COMMENTS
slight glottal gap upon phonatory tasks
slight glottal gap upon phonatory tasks  upon productive cough some subglottal secretions noted

## 2024-11-15 NOTE — SWALLOW BEDSIDE ASSESSMENT ADULT - NS SPL SWALLOW CLINIC TRIAL FT
appears to tolerate; SLP to f/u with FEES
suspected pharyngeal impairment. no additional trials at bedside due to high risk for aspiration; patient warrants FEES

## 2024-11-15 NOTE — CONSULT NOTE ADULT - ASSESSMENT
73-year-old female PMH A-fib on xarelto,, HTN, s/p cholecystectomy, congenital solitary kidney presents from NH to the ED for evaluation of weakness, decreased PO intake and abdominal distension. Admitted to ICU for septic shock 2/2 ESBL proteus (UTI vs. GI source) complicated by gastric distension w/ pneumatosis, NOLA 2/2 ATN, hypernatremia. s/p NGT decompression (-3.1 L feculent output) in ED. Intubated 11/6 for AHRF. EGD 11/8 w/ multipl ulcers, largest 10cm) pending pathology. Now with new R colon/cecal mass pending colonoscopy when more stable.     Per chart, earlier in the admission, family had expressed desire for life-prolonging interventions such as intubation and full code. Patient currently is having dysphagia. ENT consulted to assist with NG placement. Today, patient is somnolent but arousable; does not appear to have capacity to make major medical decisions. No family at bedside.     Plan:  - symptoms per primary team  - GOC pending clinical condition/NG placement    Palliative care will continue to follow.   Please call [phone] with questions or concerns 24/7.   _____________  Andrew Morales MD  Palliative Medicine  [facility]   of Geriatric and Palliative Medicine  [phone]

## 2024-11-15 NOTE — CONSULT NOTE ADULT - SUBJECTIVE AND OBJECTIVE BOX
HPI:  73-year-old female PMH A-fib on xarelto,, HTN, s/p cholecystectomy, congenital solitary kidney presents from NH to the ED for evaluation of weakness, decreased PO intake and abdominal distension. Pt's son Everton says pt has not been feeling well the past few days, and has had poor appetite which is unlike her. She's been more tired and weak, unable to get out of bed. Has not had a bowel movement in 3 days, pt unsure if she's been passing gas otherwise. She was noted to be hypotensive and have an elevated wbc count at the NH as well. No dysuria, chest pain, SOB, cough or fever per pt otherwise.  No other complaints currently.     In the ED, BP 66/45 S/P LR 2800cc bolus with no improvement in BP, started on peripheral levo. Labs with wbc 19.29, vbg lactate 2.0-->2.3, pH 7.2, pCO2 45, Na 133, AG 21, Cr 4.3 (baseline 1.1), UA contaminated. CTAP with severe gastric distension, multiple foci of intraperitoneal free air and urinary bladder thickening with free air. Surgery consulted, no intervention for now, NGT placed for gastric decompression with 3.1L feculent output, recommend GI consult.      (03 Nov 2024 21:25)    Patient somnolent but arousable. No family at bedside.     ITEMS NOT CHECKED ARE NOT PRESENT    SOCIAL HISTORY:   Significant other/partner[ ]  Children[x ]  Alevism/Spirituality:  Substance hx:  [ ]   Tobacco hx:  [ ]   Alcohol hx: [ ]   Living Situation: [ ]Home  [ ]Long term care  [ ]Rehab [ ]Other  Home Services: [ ] HHA [ ] Visting RN [ ] Hospice  Occupation:  Home Opioid hx:  [ ] Y [ ] N [ ] I-Stop Reference No:     ADVANCE DIRECTIVES:    [ ] Full Code [ ] DNR  MOLST  [ ]  Living Will  [ ]   DECISION MAKER(s):  [ ] Health Care Proxy(s)  [ ] Surrogate(s)  [ ] Guardian           Name(s): Phone Number(s):    BASELINE (I)ADL(s) (prior to admission):  [place]: [ ]Total  [ ] Moderate [ ]Dependent  Palliative Performance Status Version 2:         %    http://npcrc.org/files/news/palliative_performance_scale_ppsv2.pdf    Allergies    No Known Allergies    Intolerances    MEDICATIONS  (STANDING):  albuterol    0.083% 2.5 milliGRAM(s) Nebulizer every 6 hours  albuterol    90 MICROgram(s) HFA Inhaler 1 Puff(s) Inhalation every 4 hours  amLODIPine   Tablet 10 milliGRAM(s) Oral daily  chlorhexidine 2% Cloths 1 Application(s) Topical <User Schedule>  dextrose 5% + lactated ringers. 1000 milliLiter(s) (75 mL/Hr) IV Continuous <Continuous>  enoxaparin Injectable 90 milliGRAM(s) SubCutaneous every 12 hours  losartan 50 milliGRAM(s) Oral daily  nystatin Cream 1 Application(s) Topical two times a day  pantoprazole  Injectable 40 milliGRAM(s) IV Push two times a day  sodium chloride 0.9% for Nebulization 3 milliLiter(s) Nebulizer daily  sodium chloride 3%  Inhalation 4 milliLiter(s) Inhalation every 12 hours    MEDICATIONS  (PRN):  acetaminophen     Tablet .. 650 milliGRAM(s) Oral every 6 hours PRN Temp greater or equal to 38C (100.4F)      PRESENT SYMPTOMS: [ ]Unable to obtain due to poor mentation   Source if other than patient:  [ ]Family   [ ]Team     Pain: [ ]yes [x ]no  QOL impact -   Location -                    Aggravating factors -  Alleviating factors -   Quality -  Radiation -  Timing -   Severity (0-10 scale):  Minimal acceptable level (0-10 scale):     CPOT:    https://www.[facility].org/getattachment/wfb06m90-4l1b-4h2p-5s9u-7855l3985k5z/Critical-Care-Pain-Observation-Tool-(CPOT)    PAIN AD Score:   http://geriatrictoolkit.[facility]/cog/painad.pdf     Dyspnea:                           [x ]None[ ]Mild [ ]Moderate [ ]Severe     Respiratory Distress Observation Scale (RDOS):   A score of 0 to 2 signifies little or no respiratory distress, 3 signifies mild distress, scores 4 to 6 indicate moderate distress, and scores greater than 7 signify severe distress  https://www.[facility].ca/sites/default/files/PDFS/371504-urzmmfghuqk-engtjkar-edhekgvxssv-goilq.pdf    Anxiety:                             [ ]None[ ]Mild [ ]Moderate [ ]Severe   Fatigue:                             [ ]None[ ]Mild [ ]Moderate [ ]Severe   Nausea:                             [ ]None[ ]Mild [ ]Moderate [ ]Severe   Loss of appetite:              [ ]None[ ]Mild [ ]Moderate [ ]Severe   Constipation:                    [ ]None[ ]Mild [ ]Moderate [ ]Severe    Other Symptoms:  [x ]All other review of systems negative     Palliative Performance Status Version 2:         %    http://[facility].org/files/news/palliative_performance_scale_ppsv2.pdf  PHYSICAL EXAM:    GENERAL:  NAD   PULMONARY:  Non labored breathing  NEUROLOGIC: Lying in bed.   BEHAVIORAL/PSYCH:  Calm    LABS: I have reviewed daily labs                          9.5    15.84 )-----------( 284      ( 15 Nov 2024 06:03 )             30.9       11-15    143  |  103  |  18  ----------------------------<  136[H]  3.3[L]   |  32  |  0.8    Ca    10.2      15 Nov 2024 06:03  Phos  2.3     11-15  Mg     1.8     11-15    TPro  5.9[L]  /  Alb  3.1[L]  /  TBili  0.7  /  DBili  x   /  AST  13  /  ALT  10  /  AlkPhos  139[H]  11-15          RADIOLOGY & ADDITIONAL STUDIES: I have reviewed new imaging    PROTEIN CALORIE MALNUTRITION PRESENT: [ ]mild [ ]moderate [ ]severe [ ]underweight [ ]morbid obesity  https://www.andeal.org/vault/2440/web/files/ONC/Table_Clinical%20Characteristics%20to%20Document%20Malnutrition-White%20JV%20et%20al%202012.pdf    Height (cm): 167.6 (11-05-24 @ 01:00)  Weight (kg): 91.6 (11-05-24 @ 01:00)  BMI (kg/m2): 32.6 (11-05-24 @ 01:00)    [ ]PPSV2 < or = to 30% [ ]significant weight loss  [ ]poor nutritional intake  [ ]anasarca      [ ]Artificial Nutrition      Palliative Care Spiritual/Emotional Screening Tool Question  Severity (0-4):                    OR                    [ x] Unable to determine. Will assess at later time if appropriate.  Score of 2 or greater indicates recommendation of Chaplaincy and/or SW referral  Chaplaincy Referral: [ ] Yes [ ] Refused [ ] Following     Caregiver [place]:  [ ] Yes [ ] No    OR    [x ] Unable to determine. Will assess at later time if appropriate.  Social Work Referral [ ]  Patient and Family Centered Care Referral [ ]    Anticipatory Grief Present: [ ] Yes [ ] No    OR     [ x] Unable to determine. Will assess at later time if appropriate.  Social Work Referral [ ]  Patient and Family Centered Care Referral [ ]    REFERRALS:   [ ]Chaplaincy  [ ]Hospice  [ ]Child Life  [ ]Social Work  [ ]Case management [ ]Holistic Therapy     Palliative care education provided to patient and/or family

## 2024-11-15 NOTE — PROGRESS NOTE ADULT - ASSESSMENT
IMPRESSION:    Acute hypoxemic respiratory failure   L lung atelectasis improved  Septic shock - resolved  UTI  Suspected colon mass  Severe gastric distension w/ pneumatosis along posterior gastric wall  Possible aspiration / LLL consolidation  Intraperitoneal free air  Thrombocytopenia   hypernatremia  gastric ulcer   HAGMA  NOLA non oliguric resolved  Afib on Xarelto  HO HTN  HO ESBL Proteus    PLAN:    CNS: AVOID CNS depressant    HEENT: Oral care.     PULMONARY:  HOB @ 45 degrees.  Aspiration precautions. keep Sao2 92 to 96%, chest PT, Low flow trial    CARDIOVASCULAR: TTE. Mod AS, D5 W    GI: GI prophylaxis.  Feeding per speech    RENAL:  Follow up lytes. Correct as needed.      INFECTIOUS DISEASE: SP ABX    HEMATOLOGICAL:  Resume AC. Monitor CBC and coags.     ENDOCRINE:  Follow up FS. Insulin protocol if needed.    MUSCULOSKELETAL: Bed chair position.  Off loading    Full code  poor prognosis    MICU   IMPRESSION:    Acute hypoxemic respiratory failure   L lung atelectasis improved  Septic shock - resolved  UTI  Suspected colon mass  Severe gastric distension w/ pneumatosis along posterior gastric wall  Possible aspiration / LLL consolidation  Intraperitoneal free air  Thrombocytopenia   hypernatremia  gastric ulcer   HAGMA  NOLA non oliguric resolved  Afib on Xarelto  HO HTN  HO ESBL Proteus    PLAN:    CNS: AVOID CNS depressant    HEENT: Oral care.     PULMONARY:  HOB @ 45 degrees.  Aspiration precautions. keep Sao2 92 to 96%, chest PT, Low flow trial    CARDIOVASCULAR: TTE. Mod AS, D5 LR while NPO, bp control    GI: GI prophylaxis.  Feeding per speech, IF [id number] NGT    RENAL:  Follow up lytes. Correct as needed.      INFECTIOUS DISEASE: SP ABX    HEMATOLOGICAL:  Resume AC. Monitor CBC and coags.     ENDOCRINE:  Follow up FS. Insulin protocol if needed.    MUSCULOSKELETAL: Bed chair position.  Off loading    Full code  poor prognosis    MICU

## 2024-11-15 NOTE — PROGRESS NOTE ADULT - SUBJECTIVE AND OBJECTIVE BOX
Patient is a 73y old Female who presents with a chief complaint of abd pain (11 Nov 2024 08:22)    Overnight events/Interval History:  - Overnight NGT placement attempted x4 after which patient refused   - At bedside, patient has no acute complaints. Denies fevers, chills, chest pain, abdominal pain, n/v/d/c.     ROS:  - all negative unless indicated above    Code Status: full code    ALLERGIES:  No Known Allergies    MEDICATIONS:  STANDING MEDICATIONS  albuterol    0.083% 2.5 milliGRAM(s) Nebulizer every 6 hours  albuterol    90 MICROgram(s) HFA Inhaler 1 Puff(s) Inhalation every 4 hours  amLODIPine   Tablet 10 milliGRAM(s) Oral daily  chlorhexidine 2% Cloths 1 Application(s) Topical <User Schedule>  dexMEDEtomidine Infusion 0.5 MICROgram(s)/kG/Hr IV Continuous <Continuous>  dextrose 5% + lactated ringers. 1000 milliLiter(s) IV Continuous <Continuous>  enoxaparin Injectable 90 milliGRAM(s) SubCutaneous every 12 hours  losartan 50 milliGRAM(s) Oral daily  nystatin Cream 1 Application(s) Topical two times a day  pantoprazole  Injectable 40 milliGRAM(s) IV Push two times a day  potassium chloride  20 mEq/100 mL IVPB 20 milliEquivalent(s) IV Intermittent every 2 hours  sodium chloride 0.9% for Nebulization 3 milliLiter(s) Nebulizer daily  sodium chloride 3%  Inhalation 4 milliLiter(s) Inhalation every 12 hours    PRN MEDICATIONS  acetaminophen     Tablet .. 650 milliGRAM(s) Oral every 6 hours PRN    ICU Vital Signs Last 24 Hrs  T(C): 37.3 (15 Nov 2024 08:00), Max: 37.9 (14 Nov 2024 16:00)  T(F): 99.2 (15 Nov 2024 08:00), Max: 100.2 (14 Nov 2024 16:00)  HR: 87 (15 Nov 2024 09:00) (70 - 92)  BP: 169/77 (15 Nov 2024 09:00) (141/66 - 182/84)  BP(mean): 111 (15 Nov 2024 09:00) (93 - 120)  ABP: --  ABP(mean): --  RR: 29 (15 Nov 2024 09:00) (24 - 40)  SpO2: 96% (15 Nov 2024 09:00) (92% - 100%)    O2 Parameters below as of 15 Nov 2024 08:00  Patient On (Oxygen Delivery Method): nasal cannula, high flow  O2 Flow (L/min): 50  O2 Concentration (%): 50    PHYSICAL EXAM:  GENERAL: NAD  HEENT:  dry mucous membranes  CHEST/LUNG: decreased BS on L; normal respiratory effort  HEART: Regular rate and rhythm  ABDOMEN: Soft, nontender, mildly distended   EXTREMITIES:  No lower extremity edema bilaterally  NERVOUS SYSTEM: awake, tracking, following commands     LABS:                        9.5    15.84 )-----------( 284      ( 15 Nov 2024 06:03 )             30.9     11-15    143  |  103  |  18  ----------------------------<  136[H]  3.3[L]   |  32  |  0.8    Ca    10.2      15 Nov 2024 06:03  Phos  2.3     11-15  Mg     1.8     11-15    TPro  5.9[L]  /  Alb  3.1[L]  /  TBili  0.7  /  DBili  x   /  AST  13  /  ALT  10  /  AlkPhos  139[H]  11-15      Urinalysis Basic - ( 15 Nov 2024 06:03 )    Color: x / Appearance: x / SG: x / pH: x  Gluc: 136 mg/dL / Ketone: x  / Bili: x / Urobili: x   Blood: x / Protein: x / Nitrite: x   Leuk Esterase: x / RBC: x / WBC x   Sq Epi: x / Non Sq Epi: x / Bacteria: x      ABG - ( 13 Nov 2024 17:14 )  pH, Arterial: 7.56  pH, Blood: x     /  pCO2: 41    /  pO2: 53    / HCO3: 37    / Base Excess: 13.3  /  SaO2: 88.0          I&O's Summary    14 Nov 2024 07:01  -  15 Nov 2024 07:00  --------------------------------------------------------  IN: 2850 mL / OUT: 1370 mL / NET: 1480 mL    15 Nov 2024 07:01  -  15 Nov 2024 09:12  --------------------------------------------------------  IN: 300 mL / OUT: 0 mL / NET: 300 mL      RADIOLOGY:  CXR  Xray Chest 1 View- PORTABLE-Urgent:   ACC: [id number] EXAM:  XR CHEST PORTABLE URGENT 1V   ORDERED BY: DANIELLE DUONG     PROCEDURE DATE:  11/14/2024          INTERPRETATION:  CLINICAL HISTORY: Respiratory failure.    COMPARISON: Chest radiograph 11/14/2024.    TECHNIQUE: Frontal chestradiograph.    FINDINGS:    Support devices: None.    Cardiac/mediastinum/hilum: Obscured.    Lung parenchyma/Pleura: Near complete opacification of the left   hemithorax, increased from prior. No pneumothorax.    Skeleton/soft tissues: Unchanged.      IMPRESSION:    Near complete opacification of the left hemithorax, increased from prior.    --- End of Report ---

## 2024-11-15 NOTE — PROGRESS NOTE ADULT - SUBJECTIVE AND OBJECTIVE BOX
Over Night Events: events noted, on HHFNC, CXR improved, low grade T    PHYSICAL EXAM    ICU Vital Signs Last 24 Hrs  T(C): 37.6 (15 Nov 2024 04:00), Max: 37.9 (14 Nov 2024 16:00)  T(F): 99.6 (15 Nov 2024 04:00), Max: 100.2 (14 Nov 2024 16:00)  HR: 89 (15 Nov 2024 06:00) (70 - 92)  BP: 182/84 (15 Nov 2024 06:00) (135/63 - 182/84)  BP(mean): 120 (15 Nov 2024 06:00) (91 - 120)  RR: 28 (15 Nov 2024 06:00) (26 - 40)  SpO2: 100% (15 Nov 2024 06:00) (92% - 100%)    O2 Parameters below as of 14 Nov 2024 18:09  Patient On (Oxygen Delivery Method): nasal cannula, high flow  O2 Flow (L/min): 50  O2 Concentration (%): 50        General: ill looking  ETT  Lungs: Bilateral rhonchi  Cardiovascular: EARLENE 2.6  Abdomen: Soft, Positive BS  Extremities: No clubbing   follows commands      11-13-24 @ 07:01  -  11-14-24 @ 07:00  --------------------------------------------------------  IN:    Dexmedetomidine: 10.3 mL  Total IN: 10.3 mL    OUT:    Rectal Tube (mL): 25 mL    Voided (mL): 2550 mL  Total OUT: 2575 mL    Total NET: -2564.7 mL      11-14-24 @ 07:01  -  11-15-24 @ 06:45  --------------------------------------------------------  IN:    dextrose 5%: 2550 mL  Total IN: 2550 mL    OUT:    Voided (mL): 1170 mL  Total OUT: 1170 mL    Total NET: 1380 mL          LABS:                          9.5    15.84 )-----------( 284      ( 15 Nov 2024 06:03 )             30.9                                               11-14    150[H]  |  110  |  20  ----------------------------<  119[H]  3.6   |  32  |  0.9    Ca    10.1      14 Nov 2024 16:36  Mg     2.0     11-14    TPro  5.8[L]  /  Alb  3.1[L]  /  TBili  0.7  /  DBili  x   /  AST  12  /  ALT  9   /  AlkPhos  153[H]  11-14                                             Urinalysis Basic - ( 14 Nov 2024 16:36 )    Color: x / Appearance: x / SG: x / pH: x  Gluc: 119 mg/dL / Ketone: x  / Bili: x / Urobili: x   Blood: x / Protein: x / Nitrite: x   Leuk Esterase: x / RBC: x / WBC x   Sq Epi: x / Non Sq Epi: x / Bacteria: x                                                  LIVER FUNCTIONS - ( 14 Nov 2024 04:18 )  Alb: 3.1 g/dL / Pro: 5.8 g/dL / ALK PHOS: 153 U/L / ALT: 9 U/L / AST: 12 U/L / GGT: x                                                  Culture - Fungal, Bronchial (collected 12 Nov 2024 09:01)  Source: Bronchial  Preliminary Report (14 Nov 2024 11:01):    No growth    Culture - Acid Fast - Bronchial w/Smear (collected 12 Nov 2024 09:01)  Source: Bronch Wash  Preliminary Report (13 Nov 2024 23:07):    Culture is being performed.    Culture - Bronchial (collected 12 Nov 2024 09:01)  Source: Bronch Wash  Gram Stain (12 Nov 2024 20:33):    No Squamous epithelial cells seen per low power field    Rare polymorphonuclear leukocytes seen per low power field    No organisms seen per oil power field  Final Report (14 Nov 2024 09:38):    Commensal rosalva consistent with body site                                                                                       ABG - ( 13 Nov 2024 17:14 )  pH, Arterial: 7.56  pH, Blood: x     /  pCO2: 41    /  pO2: 53    / HCO3: 37    / Base Excess: 13.3  /  SaO2: 88.0                MEDICATIONS  (STANDING):  albuterol    0.083% 2.5 milliGRAM(s) Nebulizer every 6 hours  albuterol    90 MICROgram(s) HFA Inhaler 1 Puff(s) Inhalation every 4 hours  amLODIPine   Tablet 10 milliGRAM(s) Oral daily  chlorhexidine 2% Cloths 1 Application(s) Topical <User Schedule>  dexMEDEtomidine Infusion 0.5 MICROgram(s)/kG/Hr (11.5 mL/Hr) IV Continuous <Continuous>  dextrose 5%. 1000 milliLiter(s) (150 mL/Hr) IV Continuous <Continuous>  dextrose 5%. 1000 milliLiter(s) (150 mL/Hr) IV Continuous <Continuous>  enoxaparin Injectable 90 milliGRAM(s) SubCutaneous every 12 hours  losartan 50 milliGRAM(s) Oral daily  nystatin Cream 1 Application(s) Topical two times a day  pantoprazole  Injectable 40 milliGRAM(s) IV Push two times a day  sodium chloride 0.9% for Nebulization 3 milliLiter(s) Nebulizer daily  sodium chloride 3%  Inhalation 4 milliLiter(s) Inhalation every 12 hours    MEDICATIONS  (PRN):  acetaminophen     Tablet .. 650 milliGRAM(s) Oral every 6 hours PRN Temp greater or equal to 38C (100.4F)     Over Night Events: events noted, on HHFNC, CXR improved, low grade T, no drips    PHYSICAL EXAM    ICU Vital Signs Last 24 Hrs  T(C): 37.6 (15 Nov 2024 04:00), Max: 37.9 (14 Nov 2024 16:00)  T(F): 99.6 (15 Nov 2024 04:00), Max: 100.2 (14 Nov 2024 16:00)  HR: 89 (15 Nov 2024 06:00) (70 - 92)  BP: 182/84 (15 Nov 2024 06:00) (135/63 - 182/84)  BP(mean): 120 (15 Nov 2024 06:00) (91 - 120)  RR: 28 (15 Nov 2024 06:00) (26 - 40)  SpO2: 100% (15 Nov 2024 06:00) (92% - 100%)    O2 Parameters below as of 14 Nov 2024 18:09  Patient On (Oxygen Delivery Method): nasal cannula, high flow  O2 Flow (L/min): 50  O2 Concentration (%): 50        General: ill looking  ETT  Lungs: Bilateral rhonchi  Cardiovascular: EARLENE 2.6  Abdomen: Soft, Positive BS  Extremities: No clubbing   follows commands      11-13-24 @ 07:01  -  11-14-24 @ 07:00  --------------------------------------------------------  IN:    Dexmedetomidine: 10.3 mL  Total IN: 10.3 mL    OUT:    Rectal Tube (mL): 25 mL    Voided (mL): 2550 mL  Total OUT: 2575 mL    Total NET: -2564.7 mL      11-14-24 @ 07:01  -  11-15-24 @ 06:45  --------------------------------------------------------  IN:    dextrose 5%: 2550 mL  Total IN: 2550 mL    OUT:    Voided (mL): 1170 mL  Total OUT: 1170 mL    Total NET: 1380 mL          LABS:                          9.5    15.84 )-----------( 284      ( 15 Nov 2024 06:03 )             30.9                                               11-14    150[H]  |  110  |  20  ----------------------------<  119[H]  3.6   |  32  |  0.9    Ca    10.1      14 Nov 2024 16:36  Mg     2.0     11-14    TPro  5.8[L]  /  Alb  3.1[L]  /  TBili  0.7  /  DBili  x   /  AST  12  /  ALT  9   /  AlkPhos  153[H]  11-14                                             Urinalysis Basic - ( 14 Nov 2024 16:36 )    Color: x / Appearance: x / SG: x / pH: x  Gluc: 119 mg/dL / Ketone: x  / Bili: x / Urobili: x   Blood: x / Protein: x / Nitrite: x   Leuk Esterase: x / RBC: x / WBC x   Sq Epi: x / Non Sq Epi: x / Bacteria: x                                                  LIVER FUNCTIONS - ( 14 Nov 2024 04:18 )  Alb: 3.1 g/dL / Pro: 5.8 g/dL / ALK PHOS: 153 U/L / ALT: 9 U/L / AST: 12 U/L / GGT: x                                                  Culture - Fungal, Bronchial (collected 12 Nov 2024 09:01)  Source: Bronchial  Preliminary Report (14 Nov 2024 11:01):    No growth    Culture - Acid Fast - Bronchial w/Smear (collected 12 Nov 2024 09:01)  Source: Bronch Wash  Preliminary Report (13 Nov 2024 23:07):    Culture is being performed.    Culture - Bronchial (collected 12 Nov 2024 09:01)  Source: Bronch Wash  Gram Stain (12 Nov 2024 20:33):    No Squamous epithelial cells seen per low power field    Rare polymorphonuclear leukocytes seen per low power field    No organisms seen per oil power field  Final Report (14 Nov 2024 09:38):    Commensal rosalva consistent with body site                                                                                       ABG - ( 13 Nov 2024 17:14 )  pH, Arterial: 7.56  pH, Blood: x     /  pCO2: 41    /  pO2: 53    / HCO3: 37    / Base Excess: 13.3  /  SaO2: 88.0                MEDICATIONS  (STANDING):  albuterol    0.083% 2.5 milliGRAM(s) Nebulizer every 6 hours  albuterol    90 MICROgram(s) HFA Inhaler 1 Puff(s) Inhalation every 4 hours  amLODIPine   Tablet 10 milliGRAM(s) Oral daily  chlorhexidine 2% Cloths 1 Application(s) Topical <User Schedule>  dexMEDEtomidine Infusion 0.5 MICROgram(s)/kG/Hr (11.5 mL/Hr) IV Continuous <Continuous>  dextrose 5%. 1000 milliLiter(s) (150 mL/Hr) IV Continuous <Continuous>  dextrose 5%. 1000 milliLiter(s) (150 mL/Hr) IV Continuous <Continuous>  enoxaparin Injectable 90 milliGRAM(s) SubCutaneous every 12 hours  losartan 50 milliGRAM(s) Oral daily  nystatin Cream 1 Application(s) Topical two times a day  pantoprazole  Injectable 40 milliGRAM(s) IV Push two times a day  sodium chloride 0.9% for Nebulization 3 milliLiter(s) Nebulizer daily  sodium chloride 3%  Inhalation 4 milliLiter(s) Inhalation every 12 hours    MEDICATIONS  (PRN):  acetaminophen     Tablet .. 650 milliGRAM(s) Oral every 6 hours PRN Temp greater or equal to 38C (100.4F)

## 2024-11-15 NOTE — CHART NOTE - NSCHARTNOTEFT_GEN_A_CORE
ENT called for assistance and evaluation for possible NGt placement   Patient reportedly had multiple failed attempts by MICU Team    Patient seen and examined at bedside, patient noted to be very lethargic, not very compliant 2/2 lethargy ENT called for assistance and evaluation for possible NGT placement   Patient reportedly had multiple failed attempts by MICU Team    Patient seen and examined at bedside, patient noted to be very lethargic, not very compliant 2/2 lethargy and ?AMS.  NGT was presumed to be in place after multiple failed attempts/ NGT noted to be coiling in patient's oral cavity  CXR was obtained to confirm placement, however, NGT was noted to be in patient's right lung so NGT was removed.     Discussed with MICU team regarding other means of nutrition and general GOC moving forward as it appears that NGT is not a realistic option at this time considering overall difficulty with placing NGT/ patient's poor tolerance at NGT attempts, patient's overall poor swallow function and diminished ability to follow commands.     MICU team to discuss GOC with Palliative Care team and family  Continue care per primary team  No further acute intervention from ENT standpoint  Recall prn

## 2024-11-15 NOTE — PROGRESS NOTE ADULT - ASSESSMENT
73-year-old female PMH A-fib on xarelto,, HTN, s/p cholecystectomy, congenital solitary kidney presents from NH to the ED for evaluation of weakness, decreased PO intake and abdominal distension.     Admitted to ICU for septic shock 2/2 ESBL proteus (UTI vs. GI source) complicated by gastric distension w/ pneumatosis, NOLA 2/2 ATN, hypernatremia. s/p NGT decompression (-3.1 L feculent output) in ED. Intubated 11/6 for AHRF. EGD 11/8 w/ multipl ulcers, largest 10cm) pending pathology. Now with new R colon/cecal mass pending colonoscopy when more stable.   Extubated 11/13 to AVAPs. Now on HFNC w/ chest PT, failed S&S pending GOC discussions for NGT vs PEG    #AHRF 2/2 septic shock due to ESBL proteus (UTI Vs. GI source) - improving  #HAGMA 2/2 lactic acidosis and uremia - resolved   #NOLA likely ATN 2/2 septic shock - resolved   #Possible aspiration  #Congenital solitary kidney  - Intubated 11/6, extubated 11/13 to AVAPS  - off pressors, wean precedex   - UCx +ESBL proteus, BCX (-), trach culture with resp rosalva, sputum cx 11/12 resp rosalva  - s/p abx  - c/w HFNC, chest PT/suctioning/pulmonary toilet    #Dysphagia 2/2 critical illness  - S&S eval - failed  - pt refused NGT placement, will recall speech  - GOC w/ family and palliative for PEG  - NPO  - d5LR while NPO    #Abdominal distension w/ pneumatosis   #R colon/cecal mass on CT A/P  #Diarrhea   - NGT placed for decompression in ED with 3.1L feculent output   - GI recs, surgery recs, vascular recs appreciated   - s/p EGD 11/8 with multiple ulcers, erosive gastritis  - EGD path: chronic gastritis (-) Hpylori, no malignancy   - CT A/P with PO contrast w/ suspicious hepatic flexure narrowing and lobulated soft tissue density in R colon/cecum.   - TSH wnl  - IV PPI BID   - colonoscopy when more stable as per GI    #Hypernatremia - resolved   #Sacral DTI  - offloading, wound care, pressure injury prevention    #Afib  - hold xarelto   - c/w tx lovenox 1mg/kg q12h    #HTN  - amlodipine 10mg qd PO, losartan 50mg qd PO home meds (holding as NPO pending S&S)    ---------------------  DVT ppx: tx lovenox   Diet: NPO pending repeat S&S, GOC  GI ppx/Bowel regimen: PPI BID  Activity: bedrest  GOC: full code  Dispo: ICU  #Summary/Handoff:  - HFNC w/ chest PT, S&S/GOC for NGT vs PEG

## 2024-11-15 NOTE — SWALLOW FEES ASSESSMENT ADULT - DIAGNOSTIC IMPRESSIONS
Moderate oropharyngeal dysphagia. pt is high risk for aspiration 2/2 increased respiratory requirements, head posture, and generalized weakness and cognitive status
Moderate-severe pharyngeal dysphagia; pt is high risk for aspiration 2/2 increased respiratory requirements and therefore not appropriate for compensatory strategies

## 2024-11-15 NOTE — SWALLOW BEDSIDE ASSESSMENT ADULT - SLP PERTINENT HISTORY OF CURRENT PROBLEM
73-year-old female PMH A-fib on xarelto,, HTN, s/p cholecystectomy, congenital solitary kidney presents from NH to the ED for evaluation of weakness, decreased PO intake and abdominal distension. Admitted to ICU for septic shock 2/2 ESBL proteus (UTI vs. GI source) complicated by gastric distension w/ pneumatosis, NOLA 2/2 ATN, hypernatremia. s/p NGT decompression (-3.1 L feculent output) in ED. Intubated 11/6 for AHRF. EGD 11/8 w/ multipl ulcers, largest 10cm) pending pathology. pt s/p extubation 11/13 treated for sepsis
73-year-old female PMH A-fib on xarelto,, HTN, s/p cholecystectomy, congenital solitary kidney presents from NH to the ED for evaluation of weakness, decreased PO intake and abdominal distension. Admitted to ICU for septic shock 2/2 ESBL proteus (UTI vs. GI source) complicated by gastric distension w/ pneumatosis, NOLA 2/2 ATN, hypernatremia. s/p NGT decompression (-3.1 L feculent output) in ED. Intubated 11/6 for AHRF. EGD 11/8 w/ multipl ulcers, largest 10cm) pending pathology. pt s/p extubation 11/13 treated for sepsis

## 2024-11-15 NOTE — SWALLOW BEDSIDE ASSESSMENT ADULT - SWALLOW EVAL: DIAGNOSIS
suspected pharyngeal impairment; high risk for aspiration
pt appears to tolerate small ice chips without overt symptoms of penetration/aspiration

## 2024-11-16 LAB
ALBUMIN SERPL ELPH-MCNC: 2.9 G/DL — LOW (ref 3.5–5.2)
ALP SERPL-CCNC: 123 U/L — HIGH (ref 30–115)
ALT FLD-CCNC: 8 U/L — SIGNIFICANT CHANGE UP (ref 0–41)
ANION GAP SERPL CALC-SCNC: 8 MMOL/L — SIGNIFICANT CHANGE UP (ref 7–14)
APTT BLD: 50.1 SEC — HIGH (ref 27–39.2)
AST SERPL-CCNC: 11 U/L — SIGNIFICANT CHANGE UP (ref 0–41)
BASOPHILS # BLD AUTO: 0.11 K/UL — SIGNIFICANT CHANGE UP (ref 0–0.2)
BASOPHILS NFR BLD AUTO: 0.9 % — SIGNIFICANT CHANGE UP (ref 0–1)
BILIRUB SERPL-MCNC: 0.7 MG/DL — SIGNIFICANT CHANGE UP (ref 0.2–1.2)
BUN SERPL-MCNC: 13 MG/DL — SIGNIFICANT CHANGE UP (ref 10–20)
CALCIUM SERPL-MCNC: 10.4 MG/DL — SIGNIFICANT CHANGE UP (ref 8.4–10.5)
CHLORIDE SERPL-SCNC: 108 MMOL/L — SIGNIFICANT CHANGE UP (ref 98–110)
CO2 SERPL-SCNC: 27 MMOL/L — SIGNIFICANT CHANGE UP (ref 17–32)
CREAT SERPL-MCNC: 0.7 MG/DL — SIGNIFICANT CHANGE UP (ref 0.7–1.5)
EGFR: 92 ML/MIN/1.73M2 — SIGNIFICANT CHANGE UP
EOSINOPHIL # BLD AUTO: 0.6 K/UL — SIGNIFICANT CHANGE UP (ref 0–0.7)
EOSINOPHIL NFR BLD AUTO: 5.1 % — SIGNIFICANT CHANGE UP (ref 0–8)
GAS PNL BLDA: SIGNIFICANT CHANGE UP
GLUCOSE BLDC GLUCOMTR-MCNC: 82 MG/DL — SIGNIFICANT CHANGE UP (ref 70–99)
GLUCOSE BLDC GLUCOMTR-MCNC: 88 MG/DL — SIGNIFICANT CHANGE UP (ref 70–99)
GLUCOSE BLDC GLUCOMTR-MCNC: 90 MG/DL — SIGNIFICANT CHANGE UP (ref 70–99)
GLUCOSE SERPL-MCNC: 115 MG/DL — HIGH (ref 70–99)
HCT VFR BLD CALC: 30.4 % — LOW (ref 37–47)
HGB BLD-MCNC: 9.3 G/DL — LOW (ref 12–16)
IMM GRANULOCYTES NFR BLD AUTO: 0.8 % — HIGH (ref 0.1–0.3)
INR BLD: 1.08 RATIO — SIGNIFICANT CHANGE UP (ref 0.65–1.3)
LYMPHOCYTES # BLD AUTO: 18.8 % — LOW (ref 20.5–51.1)
LYMPHOCYTES # BLD AUTO: 2.22 K/UL — SIGNIFICANT CHANGE UP (ref 1.2–3.4)
MAGNESIUM SERPL-MCNC: 1.6 MG/DL — LOW (ref 1.8–2.4)
MCHC RBC-ENTMCNC: 29.2 PG — SIGNIFICANT CHANGE UP (ref 27–31)
MCHC RBC-ENTMCNC: 30.6 G/DL — LOW (ref 32–37)
MCV RBC AUTO: 95.6 FL — SIGNIFICANT CHANGE UP (ref 81–99)
MONOCYTES # BLD AUTO: 1.02 K/UL — HIGH (ref 0.1–0.6)
MONOCYTES NFR BLD AUTO: 8.6 % — SIGNIFICANT CHANGE UP (ref 1.7–9.3)
NEUTROPHILS # BLD AUTO: 7.77 K/UL — HIGH (ref 1.4–6.5)
NEUTROPHILS NFR BLD AUTO: 65.8 % — SIGNIFICANT CHANGE UP (ref 42.2–75.2)
NRBC # BLD: 0 /100 WBCS — SIGNIFICANT CHANGE UP (ref 0–0)
PHOSPHATE SERPL-MCNC: 2.2 MG/DL — SIGNIFICANT CHANGE UP (ref 2.1–4.9)
PLATELET # BLD AUTO: 265 K/UL — SIGNIFICANT CHANGE UP (ref 130–400)
PMV BLD: 12.1 FL — HIGH (ref 7.4–10.4)
POTASSIUM SERPL-MCNC: 2.9 MMOL/L — LOW (ref 3.5–5)
POTASSIUM SERPL-SCNC: 2.9 MMOL/L — LOW (ref 3.5–5)
PROT SERPL-MCNC: 5.5 G/DL — LOW (ref 6–8)
PROTHROM AB SERPL-ACNC: 12.8 SEC — SIGNIFICANT CHANGE UP (ref 9.95–12.87)
RBC # BLD: 3.18 M/UL — LOW (ref 4.2–5.4)
RBC # FLD: 12.5 % — SIGNIFICANT CHANGE UP (ref 11.5–14.5)
SODIUM SERPL-SCNC: 143 MMOL/L — SIGNIFICANT CHANGE UP (ref 135–146)
WBC # BLD: 11.81 K/UL — HIGH (ref 4.8–10.8)
WBC # FLD AUTO: 11.81 K/UL — HIGH (ref 4.8–10.8)

## 2024-11-16 PROCEDURE — 71045 X-RAY EXAM CHEST 1 VIEW: CPT | Mod: 26,77

## 2024-11-16 PROCEDURE — 99291 CRITICAL CARE FIRST HOUR: CPT

## 2024-11-16 PROCEDURE — 71045 X-RAY EXAM CHEST 1 VIEW: CPT | Mod: 26,76

## 2024-11-16 RX ORDER — POTASSIUM CHLORIDE 600 MG/1
20 TABLET, EXTENDED RELEASE ORAL
Refills: 0 | Status: COMPLETED | OUTPATIENT
Start: 2024-11-16 | End: 2024-11-16

## 2024-11-16 RX ORDER — POTASSIUM CHLORIDE 600 MG/1
20 TABLET, EXTENDED RELEASE ORAL ONCE
Refills: 0 | Status: COMPLETED | OUTPATIENT
Start: 2024-11-16 | End: 2024-11-16

## 2024-11-16 RX ADMIN — POTASSIUM CHLORIDE 50 MILLIEQUIVALENT(S): 600 TABLET, EXTENDED RELEASE ORAL at 06:04

## 2024-11-16 RX ADMIN — Medication 25 GRAM(S): at 08:10

## 2024-11-16 RX ADMIN — POTASSIUM CHLORIDE 50 MILLIEQUIVALENT(S): 600 TABLET, EXTENDED RELEASE ORAL at 05:19

## 2024-11-16 RX ADMIN — POTASSIUM CHLORIDE 50 MILLIEQUIVALENT(S): 600 TABLET, EXTENDED RELEASE ORAL at 12:37

## 2024-11-16 RX ADMIN — Medication 25 GRAM(S): at 06:19

## 2024-11-16 RX ADMIN — PANTOPRAZOLE SODIUM 40 MILLIGRAM(S): 40 TABLET, DELAYED RELEASE ORAL at 05:12

## 2024-11-16 RX ADMIN — Medication 2.5 MILLIGRAM(S): at 20:16

## 2024-11-16 RX ADMIN — ENOXAPARIN SODIUM 90 MILLIGRAM(S): 30 INJECTION SUBCUTANEOUS at 22:17

## 2024-11-16 RX ADMIN — POTASSIUM CHLORIDE 50 MILLIEQUIVALENT(S): 600 TABLET, EXTENDED RELEASE ORAL at 08:09

## 2024-11-16 RX ADMIN — ENOXAPARIN SODIUM 90 MILLIGRAM(S): 30 INJECTION SUBCUTANEOUS at 12:37

## 2024-11-16 RX ADMIN — NYSTATIN 1 APPLICATION(S): 100000 POWDER TOPICAL at 05:12

## 2024-11-16 RX ADMIN — Medication 2.5 MILLIGRAM(S): at 03:07

## 2024-11-16 RX ADMIN — CHLORHEXIDINE GLUCONATE 1 APPLICATION(S): 1.2 RINSE ORAL at 05:37

## 2024-11-16 RX ADMIN — PANTOPRAZOLE SODIUM 40 MILLIGRAM(S): 40 TABLET, DELAYED RELEASE ORAL at 18:03

## 2024-11-16 RX ADMIN — NYSTATIN 1 APPLICATION(S): 100000 POWDER TOPICAL at 18:04

## 2024-11-16 RX ADMIN — SODIUM CHLORIDE 4 MILLILITER(S): 9 INJECTION, SOLUTION INTRAMUSCULAR; INTRAVENOUS; SUBCUTANEOUS at 20:17

## 2024-11-16 NOTE — PROGRESS NOTE ADULT - ASSESSMENT
IMPRESSION:    Acute hypoxemic respiratory failure   Septic shock - resolved  UTI  Suspected colon mass  Severe gastric distension w/ pneumatosis along posterior gastric wall  Possible aspiration / LLL consolidation  Intraperitoneal free air  Thrombocytopenia   hypernatremia  gastric ulcer   HAGMA  NOLA non oliguric resolved  Afib on Xarelto  HO HTN  HO ESBL Proteus    PLAN:    CNS: AVOID CNS depressant. MS unchanged.     HEENT: Oral care. ENT unable to place NGT.     PULMONARY:  O2 desaturation again today. She was intubated twice. BIPAP for now with Fio2 60%. Spo2 90%. No HFNC. Stat CXR and ABG. Call the sone regarding reintubation. Nebs and hypertonic saline inhalation.     CARDIOVASCULAR: Echo with moderate AS. Keep equal balance.     GI: GI prophylaxis.  NPO for now; back on BIPAP.     RENAL:  Follow up lytes. Correct as needed. Replete K and Mg aggressively.     INFECTIOUS DISEASE: SP ABX, finished, no fevers.     HEMATOLOGICAL:  On therapeutic Lovenox for Afib.     ENDOCRINE:  Follow up FS. Insulin protocol if needed.    MUSCULOSKELETAL: Bedrest.     Full code; consult pall again; may need intubation again. Poor prognosis.     MICU

## 2024-11-16 NOTE — CHART NOTE - NSCHARTNOTEFT_GEN_A_CORE
Consult completed 11/15/24.     - Full Code as per sunrise   - Will follow - no need to reconsult  - please call [phone] or message this writer via teams for palliative care service as needed

## 2024-11-16 NOTE — PROGRESS NOTE ADULT - SUBJECTIVE AND OBJECTIVE BOX
Patient is a 73y old  Female who presents with a chief complaint of abd pain (15 Nov 2024 06:45)        Over Night Events:    Low grade temp  BIPAP   Desaturated this morning         ROS:  See HPI    PHYSICAL EXAM    ICU Vital Signs Last 24 Hrs  T(C): 37.2 (16 Nov 2024 08:00), Max: 37.8 (15 Nov 2024 20:00)  T(F): 98.9 (16 Nov 2024 08:00), Max: 100.1 (15 Nov 2024 20:00)  HR: 60 (16 Nov 2024 08:32) (60 - 87)  BP: 141/71 (16 Nov 2024 08:00) (130/65 - 174/71)  BP(mean): 98 (16 Nov 2024 08:00) (92 - 137)  ABP: --  ABP(mean): --  RR: 27 (16 Nov 2024 08:00) (27 - 42)  SpO2: 100% (16 Nov 2024 08:32) (90% - 100%)    O2 Parameters below as of 16 Nov 2024 08:00  Patient On (Oxygen Delivery Method): BiPAP/CPAP            General: BIPAP, tachypneic   HEENT: MAYCO             Lymphatic system: No cervical LN   Lungs: Bilateral BS, coarse   Cardiovascular: Regular   Gastrointestinal: Soft, Positive BS  Extremities: No clubbing.  Moves extremities.  Full Range of motion   Skin: Warm, intact  Neurological: No motor or sensory deficit       11-15-24 @ 07:01  -  11-16-24 @ 07:00  --------------------------------------------------------  IN:    dextrose 5%: 150 mL    dextrose 5%: 1100 mL    dextrose 5% + lactated ringers: 750 mL    IV PiggyBack: 350 mL  Total IN: 2350 mL    OUT:    Rectal Tube (mL): 460 mL    Voided (mL): 1000 mL  Total OUT: 1460 mL    Total NET: 890 mL      11-16-24 @ 07:01  -  11-16-24 @ 08:46  --------------------------------------------------------  IN:    dextrose 5%: 200 mL    IV PiggyBack: 100 mL    IV PiggyBack: 50 mL  Total IN: 350 mL    OUT:    Rectal Tube (mL): 50 mL  Total OUT: 50 mL    Total NET: 300 mL          LABS:                            9.3    11.81 )-----------( 265      ( 16 Nov 2024 04:27 )             30.4                                               11-16    143  |  108  |  13  ----------------------------<  115[H]  2.9[L]   |  27  |  0.7    Ca    10.4      16 Nov 2024 04:27  Phos  2.2     11-16  Mg     1.6     11-16    TPro  5.5[L]  /  Alb  2.9[L]  /  TBili  0.7  /  DBili  x   /  AST  11  /  ALT  8   /  AlkPhos  123[H]  11-16      PT/INR - ( 16 Nov 2024 04:27 )   PT: 12.80 sec;   INR: 1.08 ratio         PTT - ( 16 Nov 2024 04:27 )  PTT:50.1 sec                                       Urinalysis Basic - ( 16 Nov 2024 04:27 )    Color: x / Appearance: x / SG: x / pH: x  Gluc: 115 mg/dL / Ketone: x  / Bili: x / Urobili: x   Blood: x / Protein: x / Nitrite: x   Leuk Esterase: x / RBC: x / WBC x   Sq Epi: x / Non Sq Epi: x / Bacteria: x                                                  LIVER FUNCTIONS - ( 16 Nov 2024 04:27 )  Alb: 2.9 g/dL / Pro: 5.5 g/dL / ALK PHOS: 123 U/L / ALT: 8 U/L / AST: 11 U/L / GGT: x                                                                                                                                   ABG - ( 16 Nov 2024 02:59 )  pH, Arterial: 7.49  pH, Blood: x     /  pCO2: 39    /  pO2: 50    / HCO3: 30    / Base Excess: 5.9   /  SaO2: 82.8                MEDICATIONS  (STANDING):  albuterol    0.083% 2.5 milliGRAM(s) Nebulizer every 6 hours  albuterol    90 MICROgram(s) HFA Inhaler 1 Puff(s) Inhalation every 4 hours  amLODIPine   Tablet 10 milliGRAM(s) Oral daily  chlorhexidine 2% Cloths 1 Application(s) Topical <User Schedule>  dextrose 5%. 1000 milliLiter(s) (100 mL/Hr) IV Continuous <Continuous>  enoxaparin Injectable 90 milliGRAM(s) SubCutaneous every 12 hours  losartan 50 milliGRAM(s) Oral daily  nystatin Cream 1 Application(s) Topical two times a day  pantoprazole  Injectable 40 milliGRAM(s) IV Push two times a day  potassium chloride  20 mEq/100 mL IVPB 20 milliEquivalent(s) IV Intermittent every 2 hours  sodium chloride 0.9% for Nebulization 3 milliLiter(s) Nebulizer daily  sodium chloride 3%  Inhalation 4 milliLiter(s) Inhalation every 12 hours    MEDICATIONS  (PRN):  acetaminophen     Tablet .. 650 milliGRAM(s) Oral every 6 hours PRN Temp greater or equal to 38C (100.4F)      Xrays: no new infiltrates                                                                                     ECHO

## 2024-11-17 LAB
ALBUMIN SERPL ELPH-MCNC: 2.8 G/DL — LOW (ref 3.5–5.2)
ALP SERPL-CCNC: 113 U/L — SIGNIFICANT CHANGE UP (ref 30–115)
ALT FLD-CCNC: 8 U/L — SIGNIFICANT CHANGE UP (ref 0–41)
ANION GAP SERPL CALC-SCNC: 11 MMOL/L — SIGNIFICANT CHANGE UP (ref 7–14)
APTT BLD: 53.7 SEC — HIGH (ref 27–39.2)
AST SERPL-CCNC: 11 U/L — SIGNIFICANT CHANGE UP (ref 0–41)
BASOPHILS # BLD AUTO: 0.08 K/UL — SIGNIFICANT CHANGE UP (ref 0–0.2)
BASOPHILS NFR BLD AUTO: 0.8 % — SIGNIFICANT CHANGE UP (ref 0–1)
BILIRUB SERPL-MCNC: 0.6 MG/DL — SIGNIFICANT CHANGE UP (ref 0.2–1.2)
BUN SERPL-MCNC: 10 MG/DL — SIGNIFICANT CHANGE UP (ref 10–20)
CALCIUM SERPL-MCNC: 10.3 MG/DL — SIGNIFICANT CHANGE UP (ref 8.4–10.5)
CHLORIDE SERPL-SCNC: 108 MMOL/L — SIGNIFICANT CHANGE UP (ref 98–110)
CO2 SERPL-SCNC: 26 MMOL/L — SIGNIFICANT CHANGE UP (ref 17–32)
CREAT SERPL-MCNC: 0.7 MG/DL — SIGNIFICANT CHANGE UP (ref 0.7–1.5)
EGFR: 92 ML/MIN/1.73M2 — SIGNIFICANT CHANGE UP
EOSINOPHIL # BLD AUTO: 0.46 K/UL — SIGNIFICANT CHANGE UP (ref 0–0.7)
EOSINOPHIL NFR BLD AUTO: 4.4 % — SIGNIFICANT CHANGE UP (ref 0–8)
GLUCOSE BLDC GLUCOMTR-MCNC: 117 MG/DL — HIGH (ref 70–99)
GLUCOSE BLDC GLUCOMTR-MCNC: 117 MG/DL — HIGH (ref 70–99)
GLUCOSE BLDC GLUCOMTR-MCNC: 89 MG/DL — SIGNIFICANT CHANGE UP (ref 70–99)
GLUCOSE BLDC GLUCOMTR-MCNC: 94 MG/DL — SIGNIFICANT CHANGE UP (ref 70–99)
GLUCOSE BLDC GLUCOMTR-MCNC: 96 MG/DL — SIGNIFICANT CHANGE UP (ref 70–99)
GLUCOSE SERPL-MCNC: 88 MG/DL — SIGNIFICANT CHANGE UP (ref 70–99)
HCT VFR BLD CALC: 26.3 % — LOW (ref 37–47)
HGB BLD-MCNC: 8.4 G/DL — LOW (ref 12–16)
IMM GRANULOCYTES NFR BLD AUTO: 0.5 % — HIGH (ref 0.1–0.3)
INR BLD: 1.15 RATIO — SIGNIFICANT CHANGE UP (ref 0.65–1.3)
LYMPHOCYTES # BLD AUTO: 1.86 K/UL — SIGNIFICANT CHANGE UP (ref 1.2–3.4)
LYMPHOCYTES # BLD AUTO: 17.9 % — LOW (ref 20.5–51.1)
MAGNESIUM SERPL-MCNC: 2 MG/DL — SIGNIFICANT CHANGE UP (ref 1.8–2.4)
MCHC RBC-ENTMCNC: 29.6 PG — SIGNIFICANT CHANGE UP (ref 27–31)
MCHC RBC-ENTMCNC: 31.9 G/DL — LOW (ref 32–37)
MCV RBC AUTO: 92.6 FL — SIGNIFICANT CHANGE UP (ref 81–99)
MONOCYTES # BLD AUTO: 0.87 K/UL — HIGH (ref 0.1–0.6)
MONOCYTES NFR BLD AUTO: 8.3 % — SIGNIFICANT CHANGE UP (ref 1.7–9.3)
NEUTROPHILS # BLD AUTO: 7.1 K/UL — HIGH (ref 1.4–6.5)
NEUTROPHILS NFR BLD AUTO: 68.1 % — SIGNIFICANT CHANGE UP (ref 42.2–75.2)
NRBC # BLD: 0 /100 WBCS — SIGNIFICANT CHANGE UP (ref 0–0)
PHOSPHATE SERPL-MCNC: 2.7 MG/DL — SIGNIFICANT CHANGE UP (ref 2.1–4.9)
PLATELET # BLD AUTO: 275 K/UL — SIGNIFICANT CHANGE UP (ref 130–400)
PMV BLD: 12.2 FL — HIGH (ref 7.4–10.4)
POTASSIUM SERPL-MCNC: 3.1 MMOL/L — LOW (ref 3.5–5)
POTASSIUM SERPL-SCNC: 3.1 MMOL/L — LOW (ref 3.5–5)
PROT SERPL-MCNC: 5.3 G/DL — LOW (ref 6–8)
PROTHROM AB SERPL-ACNC: 13.6 SEC — HIGH (ref 9.95–12.87)
RBC # BLD: 2.84 M/UL — LOW (ref 4.2–5.4)
RBC # FLD: 12.4 % — SIGNIFICANT CHANGE UP (ref 11.5–14.5)
SODIUM SERPL-SCNC: 145 MMOL/L — SIGNIFICANT CHANGE UP (ref 135–146)
WBC # BLD: 10.42 K/UL — SIGNIFICANT CHANGE UP (ref 4.8–10.8)
WBC # FLD AUTO: 10.42 K/UL — SIGNIFICANT CHANGE UP (ref 4.8–10.8)

## 2024-11-17 PROCEDURE — 99291 CRITICAL CARE FIRST HOUR: CPT

## 2024-11-17 PROCEDURE — 71045 X-RAY EXAM CHEST 1 VIEW: CPT | Mod: 26

## 2024-11-17 RX ORDER — 0.9 % SODIUM CHLORIDE 0.9 %
1000 INTRAVENOUS SOLUTION INTRAVENOUS
Refills: 0 | Status: DISCONTINUED | OUTPATIENT
Start: 2024-11-17 | End: 2024-11-17

## 2024-11-17 RX ORDER — 0.9 % SODIUM CHLORIDE 0.9 %
1000 INTRAVENOUS SOLUTION INTRAVENOUS
Refills: 0 | Status: DISCONTINUED | OUTPATIENT
Start: 2024-11-17 | End: 2024-11-19

## 2024-11-17 RX ORDER — POTASSIUM CHLORIDE 600 MG/1
20 TABLET, EXTENDED RELEASE ORAL
Refills: 0 | Status: COMPLETED | OUTPATIENT
Start: 2024-11-17 | End: 2024-11-17

## 2024-11-17 RX ORDER — METHYLPREDNISOLONE SOD SUCC 125 MG
40 VIAL (EA) INJECTION DAILY
Refills: 0 | Status: DISCONTINUED | OUTPATIENT
Start: 2024-11-17 | End: 2024-11-18

## 2024-11-17 RX ADMIN — ENOXAPARIN SODIUM 90 MILLIGRAM(S): 30 INJECTION SUBCUTANEOUS at 21:13

## 2024-11-17 RX ADMIN — PANTOPRAZOLE SODIUM 40 MILLIGRAM(S): 40 TABLET, DELAYED RELEASE ORAL at 18:23

## 2024-11-17 RX ADMIN — ENOXAPARIN SODIUM 90 MILLIGRAM(S): 30 INJECTION SUBCUTANEOUS at 13:27

## 2024-11-17 RX ADMIN — Medication 75 MILLILITER(S): at 21:13

## 2024-11-17 RX ADMIN — POTASSIUM CHLORIDE 50 MILLIEQUIVALENT(S): 600 TABLET, EXTENDED RELEASE ORAL at 13:28

## 2024-11-17 RX ADMIN — PANTOPRAZOLE SODIUM 40 MILLIGRAM(S): 40 TABLET, DELAYED RELEASE ORAL at 05:31

## 2024-11-17 RX ADMIN — NYSTATIN 1 APPLICATION(S): 100000 POWDER TOPICAL at 18:23

## 2024-11-17 RX ADMIN — CHLORHEXIDINE GLUCONATE 1 APPLICATION(S): 1.2 RINSE ORAL at 05:31

## 2024-11-17 RX ADMIN — Medication 40 MILLIGRAM(S): at 13:27

## 2024-11-17 RX ADMIN — POTASSIUM CHLORIDE 50 MILLIEQUIVALENT(S): 600 TABLET, EXTENDED RELEASE ORAL at 10:02

## 2024-11-17 RX ADMIN — NYSTATIN 1 APPLICATION(S): 100000 POWDER TOPICAL at 06:00

## 2024-11-17 RX ADMIN — Medication 75 MILLILITER(S): at 10:02

## 2024-11-17 RX ADMIN — POTASSIUM CHLORIDE 50 MILLIEQUIVALENT(S): 600 TABLET, EXTENDED RELEASE ORAL at 18:22

## 2024-11-17 RX ADMIN — Medication 2.5 MILLIGRAM(S): at 20:07

## 2024-11-17 RX ADMIN — SODIUM CHLORIDE 4 MILLILITER(S): 9 INJECTION, SOLUTION INTRAMUSCULAR; INTRAVENOUS; SUBCUTANEOUS at 20:07

## 2024-11-17 NOTE — PROGRESS NOTE ADULT - SUBJECTIVE AND OBJECTIVE BOX
Patient is a 73y old  Female who presents with a chief complaint of abd pain (15 Nov 2024 06:45)        Over Night Events:    No fevers   On BIPAP for 24 hours        ROS:  See HPI    PHYSICAL EXAM    ICU Vital Signs Last 24 Hrs  T(C): 37.4 (17 Nov 2024 08:00), Max: 37.4 (16 Nov 2024 20:00)  T(F): 99.3 (17 Nov 2024 08:00), Max: 99.4 (16 Nov 2024 20:00)  HR: 77 (17 Nov 2024 08:00) (60 - 80)  BP: 148/61 (17 Nov 2024 08:00) (115/55 - 165/130)  BP(mean): 85 (17 Nov 2024 08:00) (79 - 141)  ABP: --  ABP(mean): --  RR: 37 (17 Nov 2024 08:00) (26 - 40)  SpO2: 100% (17 Nov 2024 08:00) (94% - 100%)    O2 Parameters below as of 17 Nov 2024 08:00  Patient On (Oxygen Delivery Method): BiPAP/CPAP    O2 Concentration (%): 100        General: tachypneic  HEENT: [place]             Lymphatic system: No cervical LN   Lungs: Bilateral BS; rhonchi more on the left  Cardiovascular: Regular   Gastrointestinal: Soft, Positive BS  Extremities: No clubbing.  Moves extremities.  Full Range of motion   Skin: Warm, intact  Neurological: No motor or sensory deficit       11-16-24 @ 07:01  -  11-17-24 @ 07:00  --------------------------------------------------------  IN:    dextrose 5%: 700 mL    IV PiggyBack: 50 mL    IV PiggyBack: 200 mL  Total IN: 950 mL    OUT:    Rectal Tube (mL): 525 mL    Voided (mL): 775 mL  Total OUT: 1300 mL    Total NET: -350 mL          LABS:                            8.4    10.42 )-----------( 275      ( 17 Nov 2024 04:46 )             26.3                                               11-17    145  |  108  |  10  ----------------------------<  88  3.1[L]   |  26  |  0.7    Ca    10.3      17 Nov 2024 04:46  Phos  2.7     11-17  Mg     2.0     11-17    TPro  5.3[L]  /  Alb  2.8[L]  /  TBili  0.6  /  DBili  x   /  AST  11  /  ALT  8   /  AlkPhos  113  11-17      PT/INR - ( 17 Nov 2024 04:46 )   PT: 13.60 sec;   INR: 1.15 ratio         PTT - ( 17 Nov 2024 04:46 )  PTT:53.7 sec                                       Urinalysis Basic - ( 17 Nov 2024 04:46 )    Color: x / Appearance: x / SG: x / pH: x  Gluc: 88 mg/dL / Ketone: x  / Bili: x / Urobili: x   Blood: x / Protein: x / Nitrite: x   Leuk Esterase: x / RBC: x / WBC x   Sq Epi: x / Non Sq Epi: x / Bacteria: x                                                  LIVER FUNCTIONS - ( 17 Nov 2024 04:46 )  Alb: 2.8 g/dL / Pro: 5.3 g/dL / ALK PHOS: 113 U/L / ALT: 8 U/L / AST: 11 U/L / GGT: x                                                                                                                                   ABG - ( 16 Nov 2024 12:21 )  pH, Arterial: 7.50  pH, Blood: x     /  pCO2: 38    /  pO2: 219   / HCO3: 30    / Base Excess: 6.0   /  SaO2: 96.6                MEDICATIONS  (STANDING):  albuterol    0.083% 2.5 milliGRAM(s) Nebulizer every 6 hours  albuterol    90 MICROgram(s) HFA Inhaler 1 Puff(s) Inhalation every 4 hours  amLODIPine   Tablet 10 milliGRAM(s) Oral daily  chlorhexidine 2% Cloths 1 Application(s) Topical <User Schedule>  dextrose 5%. 1000 milliLiter(s) (100 mL/Hr) IV Continuous <Continuous>  enoxaparin Injectable 90 milliGRAM(s) SubCutaneous every 12 hours  losartan 50 milliGRAM(s) Oral daily  nystatin Cream 1 Application(s) Topical two times a day  pantoprazole  Injectable 40 milliGRAM(s) IV Push two times a day  sodium chloride 0.9% for Nebulization 3 milliLiter(s) Nebulizer daily  sodium chloride 3%  Inhalation 4 milliLiter(s) Inhalation every 12 hours    MEDICATIONS  (PRN):  acetaminophen     Tablet .. 650 milliGRAM(s) Oral every 6 hours PRN Temp greater or equal to 38C (100.4F)      Xrays:                                                                                     ECHO

## 2024-11-17 NOTE — PROGRESS NOTE ADULT - SUBJECTIVE AND OBJECTIVE BOX
Patient is a 73y old Female who presents with a chief complaint of abd pain (11 Nov 2024 08:22)    Overnight events/Interval History:  - No overnight events   - At bedside, patient has no acute complaints. Denies fevers, chills, chest pain, abdominal pain, n/v/d/c.     ROS:  - all negative unless indicated above    Code Status: full code    ALLERGIES:  No Known Allergies    MEDICATIONS:  STANDING MEDICATIONS  albuterol    0.083% 2.5 milliGRAM(s) Nebulizer every 6 hours  albuterol    90 MICROgram(s) HFA Inhaler 1 Puff(s) Inhalation every 4 hours  amLODIPine   Tablet 10 milliGRAM(s) Oral daily  chlorhexidine 2% Cloths 1 Application(s) Topical <User Schedule>  dextrose 5% + lactated ringers. 1000 milliLiter(s) IV Continuous <Continuous>  enoxaparin Injectable 90 milliGRAM(s) SubCutaneous every 12 hours  losartan 50 milliGRAM(s) Oral daily  methylPREDNISolone sodium succinate Injectable 40 milliGRAM(s) IV Push daily  nystatin Cream 1 Application(s) Topical two times a day  pantoprazole  Injectable 40 milliGRAM(s) IV Push two times a day  sodium chloride 0.9% for Nebulization 3 milliLiter(s) Nebulizer daily  sodium chloride 3%  Inhalation 4 milliLiter(s) Inhalation every 12 hours    PRN MEDICATIONS  acetaminophen     Tablet .. 650 milliGRAM(s) Oral every 6 hours PRN    ICU Vital Signs Last 24 Hrs  T(C): 37.7 (17 Nov 2024 16:00), Max: 37.7 (17 Nov 2024 16:00)  T(F): 99.9 (17 Nov 2024 16:00), Max: 99.9 (17 Nov 2024 16:00)  HR: 74 (17 Nov 2024 16:00) (60 - 80)  BP: 147/63 (17 Nov 2024 16:00) (115/55 - 192/132)  BP(mean): 91 (17 Nov 2024 16:00) (79 - 158)  ABP: --  ABP(mean): --  RR: 36 (17 Nov 2024 16:00) (26 - 41)  SpO2: 98% (17 Nov 2024 16:00) (98% - 100%)    O2 Parameters below as of 17 Nov 2024 16:00  Patient On (Oxygen Delivery Method): nasal cannula, high flow  O2 Flow (L/min): 60  O2 Concentration (%): 100    PHYSICAL EXAM:  GENERAL: NAD  HEENT:  dry mucous membranes  CHEST/LUNG: decreased BS on L; normal respiratory effort  HEART: Regular rate and rhythm  ABDOMEN: Soft, nontender, mildly distended   EXTREMITIES:  No lower extremity edema bilaterally  NERVOUS SYSTEM: awake, tracking, following commands     LABS:                        8.4    10.42 )-----------( 275      ( 17 Nov 2024 04:46 )             26.3     11-17    145  |  108  |  10  ----------------------------<  88  3.1[L]   |  26  |  0.7    Ca    10.3      17 Nov 2024 04:46  Phos  2.7     11-17  Mg     2.0     11-17    TPro  5.3[L]  /  Alb  2.8[L]  /  TBili  0.6  /  DBili  x   /  AST  11  /  ALT  8   /  AlkPhos  113  11-17    PT/INR - ( 17 Nov 2024 04:46 )   PT: 13.60 sec;   INR: 1.15 ratio         PTT - ( 17 Nov 2024 04:46 )  PTT:53.7 sec  Urinalysis Basic - ( 17 Nov 2024 04:46 )    Color: x / Appearance: x / SG: x / pH: x  Gluc: 88 mg/dL / Ketone: x  / Bili: x / Urobili: x   Blood: x / Protein: x / Nitrite: x   Leuk Esterase: x / RBC: x / WBC x   Sq Epi: x / Non Sq Epi: x / Bacteria: x      ABG - ( 16 Nov 2024 12:21 )  pH, Arterial: 7.50  pH, Blood: x     /  pCO2: 38    /  pO2: 219   / HCO3: 30    / Base Excess: 6.0   /  SaO2: 96.6                      RADIOLOGY:  CXR  Xray Chest 1 View- PORTABLE-Urgent:   ACC: [id number] EXAM:  XR CHEST PORTABLE URGENT 1V   ORDERED BY: CEDRICK CHEEK     PROCEDURE DATE:  11/16/2024          INTERPRETATION:  CLINICAL HISTORY: sob.    COMPARISON: 11/16/2024.    TECHNIQUE: Frontal Adequate positioning.    FINDINGS:    Support devices: Central venous line superior vena cava    Cardiac/mediastinum/hilum: Stable.    Lung parenchyma/Pleura: Left lower lobe opacity/pleural effusion   unchanged. No air leak.    Skeleton/soft tissues: Stable.      IMPRESSION:    Left lower lobeopacity/pleural effusion unchanged. No air leak.    --- End of Report ---            RAYSA SEGURA MD; Attending Radiologist  This document has been electronically signed. Nov 16 2024  2:49PM (11-16-24 @ 11:39)  Xray Chest 1 View- PORTABLE-Urgent:   ACC: [id number] EXAM:  XR CHEST PORTABLE URGENT 1V   ORDERED BY: CEDRICK CHEEK     PROCEDURE DATE:  11/15/2024          INTERPRETATION:  CLINICAL HISTORY: ng tube replacement.    COMPARISON: November 15, 2024.    TECHNIQUE: Portable frontal chest radiograph. Limited secondary to low   lung volume and the apices cut off on the film.    FINDINGS:    Support devices: NGT with its tip overlying the right base and should be   removed and reinserted.    Cardiac/mediastinum/hilum: Stable.    Lung parenchyma/Pleura: Bilateral opacities, unchanged. No pneumothorax   is seen.    Skeleton/soft tissues: Stable.      IMPRESSION: Limited as above.    Bilateral opacities, unchanged.    NGT as above.    --- End of Report ---            MARYANN WHITING MD; Attending Radiologist  This document has been electronically signed. Nov 15 2024  6:11PM (11-15-24 @ 18:03)      CT

## 2024-11-17 NOTE — PROGRESS NOTE ADULT - ASSESSMENT
IMPRESSION:    Acute hypoxemic respiratory failure   L lung atelectasis improved  Septic shock - resolved  UTI  Suspected colon mass  Severe gastric distension w/ pneumatosis along posterior gastric wall  Possible aspiration / LLL consolidation  Intraperitoneal free air  Thrombocytopenia   hypernatremia  gastric ulcer   HAGMA  NOLA non oliguric resolved  Afib on Xarelto  HO HTN  HO ESBL Proteus    PLAN:    CNS: AVOID CNS depressant; MS remains adequate.     HEENT: Oral care.     PULMONARY: Chest pt with the vibrating bed. Hypertonic saline nebs. Albuterol nebs. Recurrence of left atelectasis on CXR. Remains on BIPAP. ABG noted. HFNC if can tolerate. Patient and son need to decide about intubation. Solumedrol 40mg daily for now.     CARDIOVASCULAR: TTE. Mod AS. Gentle D5LR 50cc/hr while NPO. Goal equal balance. Avoid overload.     GI: GI prophylaxis.  NPO while on BIPAP. Failed NGT placement by ENT.     RENAL:  Follow up lytes. Correct as needed. No ya.     INFECTIOUS DISEASE: SP Meropenem course. No fevers. No leukocytosis.     HEMATOLOGICAL: On therapeutic Lovenox.     ENDOCRINE:  Follow up FS. Insulin protocol if needed.    MUSCULOSKELETAL: Bed chair position.  Off loading    Full code.     Poor prognosis; may require intubation again; need to clarify goals of care with her and her son.     MICU.

## 2024-11-17 NOTE — PROGRESS NOTE ADULT - ASSESSMENT
73-year-old female PMH A-fib on xarelto,, HTN, s/p cholecystectomy, congenital solitary kidney presents from NH to the ED for evaluation of weakness, decreased PO intake and abdominal distension.     Admitted to ICU for septic shock 2/2 ESBL proteus (UTI vs. GI source) complicated by gastric distension w/ pneumatosis, NOLA 2/2 ATN, hypernatremia. s/p NGT decompression (-3.1 L feculent output) in ED. Intubated 11/6 for AHRF. EGD 11/8 w/ multipl ulcers, largest 10cm) pending pathology. Now with new R colon/cecal mass pending colonoscopy when more stable.   Extubated 11/13 to AVAPs, weaned to HFNC however transitioned back to BiPAP after desaturation. Failed S&S pending GOC discussions for NGT vs PEG and possible re-intubation (family still unsure/deciding)    #AHRF 2/2 septic shock due to ESBL proteus (UTI Vs. GI source) - improving  #HAGMA 2/2 lactic acidosis and uremia - resolved   #NOLA likely ATN 2/2 septic shock - resolved   #Possible aspiration  #Congenital solitary kidney  - Intubated 11/6, extubated 11/13 to AVAPS  - off pressors, wean precedex   - UCx +ESBL proteus, BCX (-), trach culture with resp rosalva, sputum cx 11/12 resp rosalva  - s/p abx (freddie)  - c/w BiPAP, chest PT/suctioning/pulmonary toilet    #Dysphagia 2/2 critical illness  - S&S eval - failed  - pt refused NGT placement, will recall speech  - GOC w/ family and palliative for PEG  - NPO  - d5LR while NPO    #Abdominal distension w/ pneumatosis   #R colon/cecal mass on CT A/P  #Diarrhea   - NGT placed for decompression in ED with 3.1L feculent output   - GI recs, surgery recs, vascular recs appreciated   - s/p EGD 11/8 with multiple ulcers, erosive gastritis  - EGD path: chronic gastritis (-) Hpylori, no malignancy   - CT A/P with PO contrast w/ suspicious hepatic flexure narrowing and lobulated soft tissue density in R colon/cecum.   - TSH wnl  - IV PPI BID   - colonoscopy when more stable as per GI    #Hypernatremia - resolved   #Sacral DTI  - offloading, wound care, pressure injury prevention    #Afib  - hold xarelto   - c/w tx lovenox 1mg/kg q12h    #HTN  - amlodipine 10mg qd PO, losartan 50mg qd PO home meds (holding as NPO pending S&S)    ---------------------  DVT ppx: tx lovenox   Diet: NPO pending repeat S&S, GOC  GI ppx/Bowel regimen: PPI BID  Activity: bedrest  GOC: full code  Dispo: ICU  #Summary/Handoff:  - GOC for NGT vs PEG, possible re-intubation, c/w BiPAP for now

## 2024-11-18 LAB
ALBUMIN SERPL ELPH-MCNC: 2.6 G/DL — LOW (ref 3.5–5.2)
ALBUMIN SERPL ELPH-MCNC: 2.8 G/DL — LOW (ref 3.5–5.2)
ALP SERPL-CCNC: 102 U/L — SIGNIFICANT CHANGE UP (ref 30–115)
ALP SERPL-CCNC: 104 U/L — SIGNIFICANT CHANGE UP (ref 30–115)
ALT FLD-CCNC: 11 U/L — SIGNIFICANT CHANGE UP (ref 0–41)
ALT FLD-CCNC: 9 U/L — SIGNIFICANT CHANGE UP (ref 0–41)
ANION GAP SERPL CALC-SCNC: 8 MMOL/L — SIGNIFICANT CHANGE UP (ref 7–14)
ANION GAP SERPL CALC-SCNC: 8 MMOL/L — SIGNIFICANT CHANGE UP (ref 7–14)
APTT BLD: 33.2 SEC — SIGNIFICANT CHANGE UP (ref 27–39.2)
APTT BLD: 51.5 SEC — HIGH (ref 27–39.2)
AST SERPL-CCNC: 10 U/L — SIGNIFICANT CHANGE UP (ref 0–41)
AST SERPL-CCNC: 16 U/L — SIGNIFICANT CHANGE UP (ref 0–41)
BASOPHILS # BLD AUTO: 0.05 K/UL — SIGNIFICANT CHANGE UP (ref 0–0.2)
BASOPHILS # BLD AUTO: 0.07 K/UL — SIGNIFICANT CHANGE UP (ref 0–0.2)
BASOPHILS NFR BLD AUTO: 0.6 % — SIGNIFICANT CHANGE UP (ref 0–1)
BASOPHILS NFR BLD AUTO: 0.6 % — SIGNIFICANT CHANGE UP (ref 0–1)
BILIRUB SERPL-MCNC: 0.5 MG/DL — SIGNIFICANT CHANGE UP (ref 0.2–1.2)
BILIRUB SERPL-MCNC: 0.5 MG/DL — SIGNIFICANT CHANGE UP (ref 0.2–1.2)
BUN SERPL-MCNC: 10 MG/DL — SIGNIFICANT CHANGE UP (ref 10–20)
BUN SERPL-MCNC: 11 MG/DL — SIGNIFICANT CHANGE UP (ref 10–20)
CALCIUM SERPL-MCNC: 10.2 MG/DL — SIGNIFICANT CHANGE UP (ref 8.4–10.5)
CALCIUM SERPL-MCNC: 10.3 MG/DL — SIGNIFICANT CHANGE UP (ref 8.4–10.5)
CHLORIDE SERPL-SCNC: 110 MMOL/L — SIGNIFICANT CHANGE UP (ref 98–110)
CHLORIDE SERPL-SCNC: 113 MMOL/L — HIGH (ref 98–110)
CO2 SERPL-SCNC: 24 MMOL/L — SIGNIFICANT CHANGE UP (ref 17–32)
CO2 SERPL-SCNC: 25 MMOL/L — SIGNIFICANT CHANGE UP (ref 17–32)
CREAT SERPL-MCNC: 0.7 MG/DL — SIGNIFICANT CHANGE UP (ref 0.7–1.5)
CREAT SERPL-MCNC: 0.8 MG/DL — SIGNIFICANT CHANGE UP (ref 0.7–1.5)
EGFR: 79 ML/MIN/1.73M2 — SIGNIFICANT CHANGE UP
EGFR: 92 ML/MIN/1.73M2 — SIGNIFICANT CHANGE UP
EOSINOPHIL # BLD AUTO: 0.12 K/UL — SIGNIFICANT CHANGE UP (ref 0–0.7)
EOSINOPHIL # BLD AUTO: 0.3 K/UL — SIGNIFICANT CHANGE UP (ref 0–0.7)
EOSINOPHIL NFR BLD AUTO: 1.4 % — SIGNIFICANT CHANGE UP (ref 0–8)
EOSINOPHIL NFR BLD AUTO: 2.6 % — SIGNIFICANT CHANGE UP (ref 0–8)
GAS PNL BLDA: SIGNIFICANT CHANGE UP
GLUCOSE BLDC GLUCOMTR-MCNC: 106 MG/DL — HIGH (ref 70–99)
GLUCOSE BLDC GLUCOMTR-MCNC: 111 MG/DL — HIGH (ref 70–99)
GLUCOSE BLDC GLUCOMTR-MCNC: 119 MG/DL — HIGH (ref 70–99)
GLUCOSE SERPL-MCNC: 100 MG/DL — HIGH (ref 70–99)
GLUCOSE SERPL-MCNC: 90 MG/DL — SIGNIFICANT CHANGE UP (ref 70–99)
HCT VFR BLD CALC: 26 % — LOW (ref 37–47)
HCT VFR BLD CALC: 28.2 % — LOW (ref 37–47)
HGB BLD-MCNC: 8 G/DL — LOW (ref 12–16)
HGB BLD-MCNC: 8.7 G/DL — LOW (ref 12–16)
IMM GRANULOCYTES NFR BLD AUTO: 0.6 % — HIGH (ref 0.1–0.3)
IMM GRANULOCYTES NFR BLD AUTO: 0.9 % — HIGH (ref 0.1–0.3)
INR BLD: 1.17 RATIO — SIGNIFICANT CHANGE UP (ref 0.65–1.3)
LYMPHOCYTES # BLD AUTO: 1.62 K/UL — SIGNIFICANT CHANGE UP (ref 1.2–3.4)
LYMPHOCYTES # BLD AUTO: 18.9 % — LOW (ref 20.5–51.1)
LYMPHOCYTES # BLD AUTO: 2.64 K/UL — SIGNIFICANT CHANGE UP (ref 1.2–3.4)
LYMPHOCYTES # BLD AUTO: 23.3 % — SIGNIFICANT CHANGE UP (ref 20.5–51.1)
MAGNESIUM SERPL-MCNC: 1.7 MG/DL — LOW (ref 1.8–2.4)
MAGNESIUM SERPL-MCNC: 2 MG/DL — SIGNIFICANT CHANGE UP (ref 1.8–2.4)
MCHC RBC-ENTMCNC: 29.2 PG — SIGNIFICANT CHANGE UP (ref 27–31)
MCHC RBC-ENTMCNC: 29.7 PG — SIGNIFICANT CHANGE UP (ref 27–31)
MCHC RBC-ENTMCNC: 30.8 G/DL — LOW (ref 32–37)
MCHC RBC-ENTMCNC: 30.9 G/DL — LOW (ref 32–37)
MCV RBC AUTO: 94.9 FL — SIGNIFICANT CHANGE UP (ref 81–99)
MCV RBC AUTO: 96.2 FL — SIGNIFICANT CHANGE UP (ref 81–99)
MONOCYTES # BLD AUTO: 0.68 K/UL — HIGH (ref 0.1–0.6)
MONOCYTES # BLD AUTO: 1.04 K/UL — HIGH (ref 0.1–0.6)
MONOCYTES NFR BLD AUTO: 7.9 % — SIGNIFICANT CHANGE UP (ref 1.7–9.3)
MONOCYTES NFR BLD AUTO: 9.2 % — SIGNIFICANT CHANGE UP (ref 1.7–9.3)
NEUTROPHILS # BLD AUTO: 6.02 K/UL — SIGNIFICANT CHANGE UP (ref 1.4–6.5)
NEUTROPHILS # BLD AUTO: 7.23 K/UL — HIGH (ref 1.4–6.5)
NEUTROPHILS NFR BLD AUTO: 63.7 % — SIGNIFICANT CHANGE UP (ref 42.2–75.2)
NEUTROPHILS NFR BLD AUTO: 70.3 % — SIGNIFICANT CHANGE UP (ref 42.2–75.2)
NRBC # BLD: 0 /100 WBCS — SIGNIFICANT CHANGE UP (ref 0–0)
NRBC # BLD: 0 /100 WBCS — SIGNIFICANT CHANGE UP (ref 0–0)
PHOSPHATE SERPL-MCNC: 1.9 MG/DL — LOW (ref 2.1–4.9)
PHOSPHATE SERPL-MCNC: 2.4 MG/DL — SIGNIFICANT CHANGE UP (ref 2.1–4.9)
PLATELET # BLD AUTO: 200 K/UL — SIGNIFICANT CHANGE UP (ref 130–400)
PLATELET # BLD AUTO: 284 K/UL — SIGNIFICANT CHANGE UP (ref 130–400)
PMV BLD: 11.7 FL — HIGH (ref 7.4–10.4)
PMV BLD: 11.8 FL — HIGH (ref 7.4–10.4)
POTASSIUM SERPL-MCNC: 3.6 MMOL/L — SIGNIFICANT CHANGE UP (ref 3.5–5)
POTASSIUM SERPL-MCNC: 3.9 MMOL/L — SIGNIFICANT CHANGE UP (ref 3.5–5)
POTASSIUM SERPL-SCNC: 3.6 MMOL/L — SIGNIFICANT CHANGE UP (ref 3.5–5)
POTASSIUM SERPL-SCNC: 3.9 MMOL/L — SIGNIFICANT CHANGE UP (ref 3.5–5)
PROT SERPL-MCNC: 5.2 G/DL — LOW (ref 6–8)
PROT SERPL-MCNC: 5.3 G/DL — LOW (ref 6–8)
PROTHROM AB SERPL-ACNC: 13.9 SEC — HIGH (ref 9.95–12.87)
RBC # BLD: 2.74 M/UL — LOW (ref 4.2–5.4)
RBC # BLD: 2.93 M/UL — LOW (ref 4.2–5.4)
RBC # FLD: 12.5 % — SIGNIFICANT CHANGE UP (ref 11.5–14.5)
RBC # FLD: 13.1 % — SIGNIFICANT CHANGE UP (ref 11.5–14.5)
SODIUM SERPL-SCNC: 143 MMOL/L — SIGNIFICANT CHANGE UP (ref 135–146)
SODIUM SERPL-SCNC: 145 MMOL/L — SIGNIFICANT CHANGE UP (ref 135–146)
WBC # BLD: 11.35 K/UL — HIGH (ref 4.8–10.8)
WBC # BLD: 8.57 K/UL — SIGNIFICANT CHANGE UP (ref 4.8–10.8)
WBC # FLD AUTO: 11.35 K/UL — HIGH (ref 4.8–10.8)
WBC # FLD AUTO: 8.57 K/UL — SIGNIFICANT CHANGE UP (ref 4.8–10.8)

## 2024-11-18 PROCEDURE — 71045 X-RAY EXAM CHEST 1 VIEW: CPT | Mod: 26,76

## 2024-11-18 PROCEDURE — 99233 SBSQ HOSP IP/OBS HIGH 50: CPT

## 2024-11-18 PROCEDURE — 74018 RADEX ABDOMEN 1 VIEW: CPT | Mod: 26

## 2024-11-18 PROCEDURE — 99291 CRITICAL CARE FIRST HOUR: CPT

## 2024-11-18 RX ORDER — FENTANYL 12 UG/H
0.5 PATCH, EXTENDED RELEASE TRANSDERMAL
Qty: 2500 | Refills: 0 | Status: DISCONTINUED | OUTPATIENT
Start: 2024-11-18 | End: 2024-11-18

## 2024-11-18 RX ORDER — POTASSIUM PHOSPHATE, MONOBASIC POTASSIUM PHOSPHATE, DIBASIC INJECTION, 236; 224 MG/ML; MG/ML
15 SOLUTION, CONCENTRATE INTRAVENOUS ONCE
Refills: 0 | Status: COMPLETED | OUTPATIENT
Start: 2024-11-18 | End: 2024-11-18

## 2024-11-18 RX ORDER — HYDRALAZINE HYDROCHLORIDE 10 MG/1
10 TABLET ORAL EVERY 8 HOURS
Refills: 0 | Status: DISCONTINUED | OUTPATIENT
Start: 2024-11-18 | End: 2024-11-20

## 2024-11-18 RX ORDER — FENTANYL 12 UG/H
0.5 PATCH, EXTENDED RELEASE TRANSDERMAL
Qty: 2500 | Refills: 0 | Status: DISCONTINUED | OUTPATIENT
Start: 2024-11-18 | End: 2024-11-20

## 2024-11-18 RX ORDER — PROPOFOL 10 MG/ML
5 INJECTION, EMULSION INTRAVENOUS
Qty: 1000 | Refills: 0 | Status: DISCONTINUED | OUTPATIENT
Start: 2024-11-18 | End: 2024-11-20

## 2024-11-18 RX ORDER — CHLORHEXIDINE GLUCONATE 1.2 MG/ML
15 RINSE ORAL EVERY 12 HOURS
Refills: 0 | Status: DISCONTINUED | OUTPATIENT
Start: 2024-11-18 | End: 2024-12-09

## 2024-11-18 RX ORDER — DEXMEDETOMIDINE HYDROCHLORIDE 4 UG/ML
0.2 INJECTION, SOLUTION INTRAVENOUS
Qty: 400 | Refills: 0 | Status: DISCONTINUED | OUTPATIENT
Start: 2024-11-18 | End: 2024-11-19

## 2024-11-18 RX ADMIN — PROPOFOL 2.75 MICROGRAM(S)/KG/MIN: 10 INJECTION, EMULSION INTRAVENOUS at 17:11

## 2024-11-18 RX ADMIN — NYSTATIN 1 APPLICATION(S): 100000 POWDER TOPICAL at 17:46

## 2024-11-18 RX ADMIN — FENTANYL 4.58 MICROGRAM(S)/KG/HR: 12 PATCH, EXTENDED RELEASE TRANSDERMAL at 17:46

## 2024-11-18 RX ADMIN — CHLORHEXIDINE GLUCONATE 1 APPLICATION(S): 1.2 RINSE ORAL at 08:44

## 2024-11-18 RX ADMIN — CHLORHEXIDINE GLUCONATE 15 MILLILITER(S): 1.2 RINSE ORAL at 17:11

## 2024-11-18 RX ADMIN — Medication 25 GRAM(S): at 08:44

## 2024-11-18 RX ADMIN — Medication 40 MILLIGRAM(S): at 05:18

## 2024-11-18 RX ADMIN — SODIUM CHLORIDE 4 MILLILITER(S): 9 INJECTION, SOLUTION INTRAMUSCULAR; INTRAVENOUS; SUBCUTANEOUS at 10:12

## 2024-11-18 RX ADMIN — Medication 2.5 MILLIGRAM(S): at 10:11

## 2024-11-18 RX ADMIN — ENOXAPARIN SODIUM 90 MILLIGRAM(S): 30 INJECTION SUBCUTANEOUS at 11:02

## 2024-11-18 RX ADMIN — POTASSIUM PHOSPHATE, MONOBASIC POTASSIUM PHOSPHATE, DIBASIC INJECTION, 63.75 MILLIMOLE(S): 236; 224 SOLUTION, CONCENTRATE INTRAVENOUS at 13:02

## 2024-11-18 RX ADMIN — PANTOPRAZOLE SODIUM 40 MILLIGRAM(S): 40 TABLET, DELAYED RELEASE ORAL at 17:11

## 2024-11-18 RX ADMIN — PANTOPRAZOLE SODIUM 40 MILLIGRAM(S): 40 TABLET, DELAYED RELEASE ORAL at 05:18

## 2024-11-18 NOTE — PROGRESS NOTE ADULT - SUBJECTIVE AND OBJECTIVE BOX
HPI:  73-year-old female PMH A-fib on xarelto,, HTN, s/p cholecystectomy, congenital solitary kidney presents from NH to the ED for evaluation of weakness, decreased PO intake and abdominal distension. Pt's son Everton says pt has not been feeling well the past few days, and has had poor appetite which is unlike her. She's been more tired and weak, unable to get out of bed. Has not had a bowel movement in 3 days, pt unsure if she's been passing gas otherwise. She was noted to be hypotensive and have an elevated wbc count at the NH as well. No dysuria, chest pain, SOB, cough or fever per pt otherwise.  No other complaints currently.     In the ED, BP 66/45 S/P LR 2800cc bolus with no improvement in BP, started on peripheral levo. Labs with wbc 19.29, vbg lactate 2.0-->2.3, pH 7.2, pCO2 45, Na 133, AG 21, Cr 4.3 (baseline 1.1), UA contaminated. CTAP with severe gastric distension, multiple foci of intraperitoneal free air and urinary bladder thickening with free air. Surgery consulted, no intervention for now, NGT placed for gastric decompression with 3.1L feculent output, recommend GI consult.      (03 Nov 2024 21:25)    Patient somnolent but arousable. No family at bedside.     ITEMS NOT CHECKED ARE NOT PRESENT    SOCIAL HISTORY:   Significant other/partner[ ]  Children[x ]  Congregation/Spirituality:  Substance hx:  [ ]   Tobacco hx:  [ ]   Alcohol hx: [ ]   Living Situation: [ ]Home  [ ]Long term care  [ ]Rehab [ ]Other  Home Services: [ ] HHA [ ] Visting RN [ ] Hospice  Occupation:  Home Opioid hx:  [ ] Y [ ] N [ ] I-Stop Reference No:     ADVANCE DIRECTIVES:    [x ] Full Code [ ] DNR  MOLST  [ ]  Living Will  [ ]   DECISION MAKER(s):  [ ] Health Care Proxy(s)  [ ] Surrogate(s)  [ ] Guardian           Name(s): Phone Number(s):    BASELINE (I)ADL(s) (prior to admission):  [place]: [ ]Total  [ ] Moderate [ ]Dependent  Palliative Performance Status Version 2:         %    http://npcrc.org/files/news/palliative_performance_scale_ppsv2.pdf    Allergies    No Known Allergies    Intolerances    MEDICATIONS  (STANDING):  albuterol    0.083% 2.5 milliGRAM(s) Nebulizer every 6 hours  albuterol    90 MICROgram(s) HFA Inhaler 1 Puff(s) Inhalation every 4 hours  amLODIPine   Tablet 10 milliGRAM(s) Oral daily  chlorhexidine 2% Cloths 1 Application(s) Topical <User Schedule>  dextrose 5% + lactated ringers. 1000 milliLiter(s) (75 mL/Hr) IV Continuous <Continuous>  enoxaparin Injectable 90 milliGRAM(s) SubCutaneous every 12 hours  losartan 50 milliGRAM(s) Oral daily  nystatin Cream 1 Application(s) Topical two times a day  pantoprazole  Injectable 40 milliGRAM(s) IV Push two times a day  potassium phosphate IVPB 15 milliMole(s) IV Intermittent once  sodium chloride 0.9% for Nebulization 3 milliLiter(s) Nebulizer daily  sodium chloride 3%  Inhalation 4 milliLiter(s) Inhalation every 12 hours    MEDICATIONS  (PRN):  acetaminophen     Tablet .. 650 milliGRAM(s) Oral every 6 hours PRN Temp greater or equal to 38C (100.4F)  hydrALAZINE Injectable 10 milliGRAM(s) IV Push every 8 hours PRN SBP>180      PRESENT SYMPTOMS: [ ]Unable to obtain due to poor mentation   Source if other than patient:  [ ]Family   [ ]Team     Pain: [ ]yes [x ]no  QOL impact -   Location -                    Aggravating factors -  Alleviating factors -   Quality -  Radiation -  Timing -   Severity (0-10 scale):  Minimal acceptable level (0-10 scale):     CPOT:    https://www.[facility].org/getattachment/vgt63g16-0t4s-9e8k-5s1u-1233v9806d0o/Critical-Care-Pain-Observation-Tool-(CPOT)    PAIN AD Score:   http://geriatrictoolkit.[place].[facility]/cog/painad.pdf     Dyspnea:                           [x ]None[ ]Mild [ ]Moderate [ ]Severe     Respiratory Distress Observation Scale (RDOS):   A score of 0 to 2 signifies little or no respiratory distress, 3 signifies mild distress, scores 4 to 6 indicate moderate distress, and scores greater than 7 signify severe distress  https://www.[facility].ca/sites/default/files/PDFS/201191-fhegbsogipr-hdtkgwhd-pznkpsfgjkr-ddeun.pdf    Anxiety:                             [ ]None[ ]Mild [ ]Moderate [ ]Severe   Fatigue:                             [ ]None[ ]Mild [ ]Moderate [ ]Severe   Nausea:                             [ ]None[ ]Mild [ ]Moderate [ ]Severe   Loss of appetite:              [ ]None[ ]Mild [ ]Moderate [ ]Severe   Constipation:                    [ ]None[ ]Mild [ ]Moderate [ ]Severe    Other Symptoms:  [x ]All other review of systems negative     Palliative Performance Status Version 2:         %    http://[facility].org/files/news/palliative_performance_scale_ppsv2.pdf  PHYSICAL EXAM:    ICU Vital Signs Last 24 Hrs  T(C): 37.4 (18 Nov 2024 08:00), Max: 37.7 (17 Nov 2024 16:00)  T(F): 99.3 (18 Nov 2024 08:00), Max: 99.9 (17 Nov 2024 16:00)  HR: 87 (18 Nov 2024 09:01) (60 - 87)  BP: 172/75 (18 Nov 2024 08:00) (135/60 - 188/77)  BP(mean): 108 (18 Nov 2024 08:00) (87 - 110)  ABP: --  ABP(mean): --  RR: 28 (18 Nov 2024 09:01) (27 - 40)  SpO2: 95% (18 Nov 2024 09:01) (93% - 100%)    O2 Parameters below as of 18 Nov 2024 09:01  Patient On (Oxygen Delivery Method): nasal cannula, high flow  O2 Flow (L/min): 60  O2 Concentration (%): 90      GENERAL:  [x ] No acute distress [ ]Lethargic  [ ]Unarousable  [ ]Verbal  [ ]Non-Verbal [ ]Cachexia    BEHAVIORAL/PSYCH:  [ ]Alert and Oriented x  [ ] Anxiety [ ] Delirium [ ] Agitation [x ] Calm   EYES: [x ] No scleral icterus [ ] Scleral icterus [ ] Closed  ENMT:  [ ]Dry mouth  [ ]No external oral lesions [x ] No external ear or nose lesions  CARDIOVASCULAR:  [ ]Regular [ ]Irregular [ ]Tachy [x ]Not Tachy  [ ]Jay [ ] Edema [ ] No edema  PULMONARY:  [ ]Tachypnea  [ ]Audible excessive secretions [x ] No labored breathing [ ] mildly labored breathing [ ] labored breathing  GASTROINTESTINAL: [ ]Soft  [ ]Distended  [x ]Not distended [ ]Non tender [ ]Tender  MUSCULOSKELETAL: [ ]No clubbing [ ] clubbing  [x ] No cyanosis [ ] cyanosis  NEUROLOGIC: [ ]No focal deficits  [ ]Follows commands  [ ]Does not follow commands  [x ]Cognitive impairment  [ ]Dysphagia  [ ]Dysarthria  [ ]Paresis   SKIN: [ ] Jaundiced [x ] Non-jaundiced [ ]Rash [ ]No Rash [ ] Warm [ ] Dry  MISC/LINES: [ ] ET tube [ ] Trach [ ]NGT/OGT [ ]PEG [ ]Burger    CRITICAL CARE:  [ ] Shock Present  [ ]Septic [ ]Cardiogenic [ ]Neurologic [ ]Hypovolemic  [ ]  Vasopressors [ ]  Inotropes   [ ]Respiratory failure present [ ]Mechanical ventilation [ ]Non-invasive ventilatory support [x ]High flow  [ ]Acute  [ ]Chronic [ ]Hypoxic  [ ]Hypercarbic [ ]Other  [ ]Other organ failure   LABS: I have reviewed daily labs                          8.0    8.57  )-----------( 200      ( 18 Nov 2024 04:39 )             26.0     11-18    143  |  110  |  10  ----------------------------<  100[H]  3.6   |  25  |  0.7    Ca    10.3      18 Nov 2024 04:39  Phos  1.9     11-18  Mg     1.7     11-18    TPro  5.2[L]  /  Alb  2.6[L]  /  TBili  0.5  /  DBili  x   /  AST  10  /  ALT  9   /  AlkPhos  102  11-18    PT/INR - ( 18 Nov 2024 04:39 )   PT: 13.90 sec;   INR: 1.17 ratio         PTT - ( 18 Nov 2024 04:39 )  PTT:51.5 sec  Urinalysis Basic - ( 18 Nov 2024 04:39 )    Color: x / Appearance: x / SG: x / pH: x  Gluc: 100 mg/dL / Ketone: x  / Bili: x / Urobili: x   Blood: x / Protein: x / Nitrite: x   Leuk Esterase: x / RBC: x / WBC x   Sq Epi: x / Non Sq Epi: x / Bacteria: x            RADIOLOGY & ADDITIONAL STUDIES: I have reviewed new imaging  < from: Xray Chest 1 View- PORTABLE-Routine (Xray Chest 1 View- PORTABLE-Routine in AM.) (11.18.24 @ 07:07) >  IMPRESSION:    Complete opacification left hemithorax with ipsilateral shift of the   mediastinum. May reflect mucous plugging with resulting atelectasis.   Pleural effusion and/or opacifications may be present.    Findings were discussed with Dr. Shankar during the time interpretation   on November 18, 2024 at 10:15 AM with read back    --- End of Report ---    < end of copied text >    PROTEIN CALORIE MALNUTRITION PRESENT: [ ]mild [ ]moderate [ ]severe [ ]underweight [ ]morbid obesity  https://www.andeal.org/vault/2440/web/files/ONC/Table_Clinical%20Characteristics%20to%20Document%20Malnutrition-White%20JV%20et%20al%202012.pdf    Height (cm): 167.6 (11-05-24 @ 01:00)  Weight (kg): 91.6 (11-05-24 @ 01:00)  BMI (kg/m2): 32.6 (11-05-24 @ 01:00)    [ ]PPSV2 < or = to 30% [ ]significant weight loss  [ ]poor nutritional intake  [ ]anasarca      [ ]Artificial Nutrition      Palliative Care Spiritual/Emotional Screening Tool Question  Severity (0-4):                    OR                    [ x] Unable to determine. Will assess at later time if appropriate.  Score of 2 or greater indicates recommendation of Chaplaincy and/or SW referral  Chaplaincy Referral: [ ] Yes [ ] Refused [ ] Following     Caregiver Yancey:  [ ] Yes [ ] No    OR    [x ] Unable to determine. Will assess at later time if appropriate.  Social Work Referral [ ]  Patient and Family Centered Care Referral [ ]    Anticipatory Grief Present: [ ] Yes [ ] No    OR     [ x] Unable to determine. Will assess at later time if appropriate.  Social Work Referral [ ]  Patient and Family Centered Care Referral [ ]    REFERRALS:   [ ]Chaplaincy  [ ]Hospice  [ ]Child Life  [ ]Social Work  [ ]Case management [ ]Holistic Therapy     Palliative care education provided to patient and/or family

## 2024-11-18 NOTE — PROGRESS NOTE ADULT - ASSESSMENT
73-year-old female PMH A-fib on xarelto,, HTN, s/p cholecystectomy, congenital solitary kidney presents from NH to the ED for evaluation of weakness, decreased PO intake and abdominal distension. Admitted to ICU for septic shock 2/2 ESBL proteus (UTI vs. GI source) complicated by gastric distension w/ pneumatosis, NOLA 2/2 ATN, hypernatremia. s/p NGT decompression (-3.1 L feculent output) in ED. Intubated 11/6 for AHRF. EGD 11/8 w/ multipl ulcers, largest 10cm) pending pathology. Now with new R colon/cecal mass pending colonoscopy when more stable.     Saw patient at bedside. Patient confused but arousable. Denied pain or discomfort at this time. Spoke with daughter on the phone and attempted to discuss goals of care going forward. The family was coming in peson today to speak with medical team to get an update regarding patient's condition. They want to maintain full code at this time but are open to having further discussions based on patient's prognosis going forward.     Palliative care will continue to follow.   Please call [phone] with questions or concerns 24/7.

## 2024-11-18 NOTE — PROGRESS NOTE ADULT - SUBJECTIVE AND OBJECTIVE BOX
Patient is a 73y old  Female who presents with a chief complaint of abd pain (15 Nov 2024 06:45)        Over Night Events:  remains critically ill on MV>  Off pressors.  On IVF.  ON HFNCO2.          ROS:     All ROS are negative except HPI         PHYSICAL EXAM    ICU Vital Signs Last 24 Hrs  T(C): 37.4 (18 Nov 2024 08:00), Max: 37.7 (17 Nov 2024 16:00)  T(F): 99.3 (18 Nov 2024 08:00), Max: 99.9 (17 Nov 2024 16:00)  HR: 73 (18 Nov 2024 08:00) (60 - 82)  BP: 172/75 (18 Nov 2024 08:00) (135/60 - 192/132)  BP(mean): 108 (18 Nov 2024 08:00) (87 - 158)  ABP: --  ABP(mean): --  RR: 31 (18 Nov 2024 08:00) (27 - 41)  SpO2: 93% (18 Nov 2024 08:00) (93% - 100%)    O2 Parameters below as of 17 Nov 2024 20:03  Patient On (Oxygen Delivery Method): nasal cannula, high flow  O2 Flow (L/min): 60  O2 Concentration (%): 100        CONSTITUTIONAL:  Ill appearing     ENT:   Airway patent,   Mouth with normal mucosa.       EYES:   Pupils equal,   Round and reactive to light.    CARDIAC:   Normal rate,   Regular rhythm.        RESPIRATORY:   No wheezing  Dec BS left side   Normal chest expansion  Not tachypneic,  No use of accessory muscles    GASTROINTESTINAL:  Abdomen soft,   Non-tender,   No guarding,   + BS    MUSCULOSKELETAL:   Range of motion is not limited,  No clubbing, cyanosis    NEUROLOGICAL:   Alert   Follows simple commands       SKIN:   Skin normal color for race,   Warm and dry  No evidence of rash.          11-17-24 @ 07:01  -  11-18-24 @ 07:00  --------------------------------------------------------  IN:    dextrose 5% + lactated ringers: 675 mL    dextrose 5% + lactated ringers: 825 mL    IV PiggyBack: 400 mL  Total IN: 1900 mL    OUT:    Rectal Tube (mL): 440 mL    Voided (mL): 525 mL  Total OUT: 965 mL    Total NET: 935 mL      11-18-24 @ 07:01  -  11-18-24 @ 08:52  --------------------------------------------------------  IN:    dextrose 5% + lactated ringers: 150 mL    IV PiggyBack: 50 mL  Total IN: 200 mL    OUT:  Total OUT: 0 mL    Total NET: 200 mL          LABS:                            8.0    8.57  )-----------( 200      ( 18 Nov 2024 04:39 )             26.0                                               11-18    143  |  110  |  10  ----------------------------<  100[H]  3.6   |  25  |  0.7    Ca    10.3      18 Nov 2024 04:39  Phos  1.9     11-18  Mg     1.7     11-18    TPro  5.2[L]  /  Alb  2.6[L]  /  TBili  0.5  /  DBili  x   /  AST  10  /  ALT  9   /  AlkPhos  102  11-18      PT/INR - ( 18 Nov 2024 04:39 )   PT: 13.90 sec;   INR: 1.17 ratio         PTT - ( 18 Nov 2024 04:39 )  PTT:51.5 sec                                       Urinalysis Basic - ( 18 Nov 2024 04:39 )    Color: x / Appearance: x / SG: x / pH: x  Gluc: 100 mg/dL / Ketone: x  / Bili: x / Urobili: x   Blood: x / Protein: x / Nitrite: x   Leuk Esterase: x / RBC: x / WBC x   Sq Epi: x / Non Sq Epi: x / Bacteria: x                                                  LIVER FUNCTIONS - ( 18 Nov 2024 04:39 )  Alb: 2.6 g/dL / Pro: 5.2 g/dL / ALK PHOS: 102 U/L / ALT: 9 U/L / AST: 10 U/L / GGT: x                                                                                                                      MEDICATIONS  (STANDING):  albuterol    0.083% 2.5 milliGRAM(s) Nebulizer every 6 hours  albuterol    90 MICROgram(s) HFA Inhaler 1 Puff(s) Inhalation every 4 hours  amLODIPine   Tablet 10 milliGRAM(s) Oral daily  chlorhexidine 2% Cloths 1 Application(s) Topical <User Schedule>  dextrose 5% + lactated ringers. 1000 milliLiter(s) (75 mL/Hr) IV Continuous <Continuous>  enoxaparin Injectable 90 milliGRAM(s) SubCutaneous every 12 hours  losartan 50 milliGRAM(s) Oral daily  methylPREDNISolone sodium succinate Injectable 40 milliGRAM(s) IV Push daily  nystatin Cream 1 Application(s) Topical two times a day  pantoprazole  Injectable 40 milliGRAM(s) IV Push two times a day  sodium chloride 0.9% for Nebulization 3 milliLiter(s) Nebulizer daily  sodium chloride 3%  Inhalation 4 milliLiter(s) Inhalation every 12 hours    MEDICATIONS  (PRN):  acetaminophen     Tablet .. 650 milliGRAM(s) Oral every 6 hours PRN Temp greater or equal to 38C (100.4F)      New X-rays reviewed:                                                                                  ECHO

## 2024-11-18 NOTE — PROGRESS NOTE ADULT - ASSESSMENT
IMPRESSION:    Acute hypoxemic respiratory failure   L lung atelectasis  Septic shock - resolved  UTI  Suspected colon mass  Severe gastric distension w/ pneumatosis along posterior gastric wall  Possible aspiration / LLL consolidation  Intraperitoneal free air  Thrombocytopenia   hypernatremia  gastric ulcer   HAGMA  NOLA non oliguric resolved  Afib on Xarelto  HO HTN  HO ESBL Proteus    PLAN:    CNS: AVOID CNS depressant; MS remains adequate.     HEENT: Oral care. NGT     PULMONARY: Chest pt with the vibrating bed. Hypertonic saline nebs. Albuterol nebs. Chest PT attention left lung.  Wean o2 as tolerated.  keep O2 sat 92-96%.  DC Steroids.  IPV Q6.  Might need MV      CARDIOVASCULAR: TTE. Mod AS. Gentle D5LR 50cc/hr while NPO. Goal equal balance. Avoid overload.     GI: GI prophylaxis.  NPO    RENAL:  Follow up lytes. Correct as needed. No ya.     INFECTIOUS DISEASE: SP Meropenem course. No fevers. No leukocytosis.     HEMATOLOGICAL: On therapeutic Lovenox. Monitor CBC     ENDOCRINE:  Follow up FS. Insulin protocol if needed.    MUSCULOSKELETAL: Bed chair position.  Off loading    Full code.     Poor prognosis; may require intubation again; need to clarify goals of care with her and her family     MICU.

## 2024-11-18 NOTE — PROGRESS NOTE ADULT - ASSESSMENT
73-year-old female PMH A-fib on xarelto,, HTN, s/p cholecystectomy, congenital solitary kidney presents from NH to the ED for evaluation of weakness, decreased PO intake and abdominal distension.   Admitted to ICU for septic shock 2/2 ESBL proteus (UTI vs. GI source) complicated by gastric distension w/ pneumatosis, NOLA 2/2 ATN, hypernatremia. s/p NGT decompression (-3.1 L feculent output) in ED. Intubated 11/6 for AHRF. EGD 11/8 w/ multiple ulcers, largest 10cm), path +gastritis. Now with new R colon/cecal mass pending colonoscopy when more stable.   Extubated 11/13 to AVAPs, weaned to HFNC however transitioned back to BiPAP after desaturation. Failed S&S, ongoing GOC discussions for NGT vs PEG and possible re-intubation.    #AHRF 2/2 septic shock due to ESBL proteus (UTI Vs. GI source) - improving  #HAGMA 2/2 lactic acidosis and uremia - resolved   #NOLA likely ATN 2/2 septic shock - resolved   #Possible aspiration  #Congenital solitary kidney  - Intubated 11/6, extubated 11/13 to AVAPS  - off pressors, wean precedex   - UCx +ESBL proteus, BCX (-), trach culture with resp rosalva, sputum cx 11/12 resp rosalva  - s/p abx (freddie)  - c/w BiPAP/HFNC, chest PT/suctioning/pulmonary toilet    #Dysphagia 2/2 critical illness  - S&S eval - failed  - NGT placement - failed multiple times by ICU staff and ENT, will try again today  - GOC w/ family and palliative for PEG  - d5LR while NPO    #Abdominal distension w/ pneumatosis   #R colon/cecal mass on CT A/P  #Diarrhea   - NGT placed for decompression in ED with 3.1L feculent output   - GI recs, surgery recs, vascular recs appreciated   - s/p EGD 11/8 with multiple ulcers, erosive gastritis  - EGD path: chronic gastritis (-) Hpylori, no malignancy   - CT A/P with PO contrast w/ suspicious hepatic flexure narrowing and lobulated soft tissue density in R colon/cecum.   - TSH wnl  - IV PPI BID   - colonoscopy when more stable as per GI  - KUB today    #Hypernatremia - resolved     #Sacral DTI  - offloading, wound care, pressure injury prevention    #Afib  - hold xarelto   - c/w tx lovenox 1mg/kg q12h    #HTN  - amlodipine 10mg qd PO, losartan 50mg qd PO home meds (holding as NPO pending S&S)  - PRN hydral for SBP >180 while npo    ---------------------  DVT ppx: tx lovenox   Diet: NPO, repeat NGT/S&S  GI ppx/Bowel regimen: PPI BID  Activity: bedrest  GOC: full code  Dispo: ICU  #Summary/Handoff:  - IPV/pulmonary toilet, KUB, reattempt NGT, GOC for re-intubation

## 2024-11-18 NOTE — PROGRESS NOTE ADULT - SUBJECTIVE AND OBJECTIVE BOX
Patient is a 73y old Female who presents with a chief complaint of abd pain (11 Nov 2024 08:22)    Overnight events/Interval History:  - No overnight events   - At bedside, patient appears weak, +cough, no active complaints    Code Status: full code    ALLERGIES:  No Known Allergies    MEDICATIONS:  STANDING MEDICATIONS  albuterol    0.083% 2.5 milliGRAM(s) Nebulizer every 6 hours  albuterol    90 MICROgram(s) HFA Inhaler 1 Puff(s) Inhalation every 4 hours  amLODIPine   Tablet 10 milliGRAM(s) Oral daily  chlorhexidine 2% Cloths 1 Application(s) Topical <User Schedule>  dextrose 5% + lactated ringers. 1000 milliLiter(s) IV Continuous <Continuous>  enoxaparin Injectable 90 milliGRAM(s) SubCutaneous every 12 hours  losartan 50 milliGRAM(s) Oral daily  nystatin Cream 1 Application(s) Topical two times a day  pantoprazole  Injectable 40 milliGRAM(s) IV Push two times a day  sodium chloride 0.9% for Nebulization 3 milliLiter(s) Nebulizer daily  sodium chloride 3%  Inhalation 4 milliLiter(s) Inhalation every 12 hours    PRN MEDICATIONS  acetaminophen     Tablet .. 650 milliGRAM(s) Oral every 6 hours PRN  hydrALAZINE Injectable 10 milliGRAM(s) IV Push every 8 hours PRN    ICU Vital Signs Last 24 Hrs  T(C): 37.4 (18 Nov 2024 08:00), Max: 37.7 (17 Nov 2024 16:00)  T(F): 99.3 (18 Nov 2024 08:00), Max: 99.9 (17 Nov 2024 16:00)  HR: 87 (18 Nov 2024 09:01) (60 - 87)  BP: 172/75 (18 Nov 2024 08:00) (135/60 - 192/132)  BP(mean): 108 (18 Nov 2024 08:00) (87 - 158)  ABP: --  ABP(mean): --  RR: 28 (18 Nov 2024 09:01) (27 - 41)  SpO2: 95% (18 Nov 2024 09:01) (93% - 100%)    O2 Parameters below as of 18 Nov 2024 09:01  Patient On (Oxygen Delivery Method): nasal cannula, high flow  O2 Flow (L/min): 60  O2 Concentration (%): 90      PHYSICAL EXAM:  GENERAL: NAD  HEENT:  dry mucous membranes  CHEST/LUNG: decreased BS on L; tachypneic, weak cough  HEART: Regular rate and rhythm  ABDOMEN: Soft, nontender, mildly distended   EXTREMITIES:  No lower extremity edema bilaterally  NERVOUS SYSTEM: awake, tracking, following commands     LABS:                        8.0    8.57  )-----------( 200      ( 18 Nov 2024 04:39 )             26.0     11-18    143  |  110  |  10  ----------------------------<  100[H]  3.6   |  25  |  0.7    Ca    10.3      18 Nov 2024 04:39  Phos  1.9     11-18  Mg     1.7     11-18    TPro  5.2[L]  /  Alb  2.6[L]  /  TBili  0.5  /  DBili  x   /  AST  10  /  ALT  9   /  AlkPhos  102  11-18    PT/INR - ( 18 Nov 2024 04:39 )   PT: 13.90 sec;   INR: 1.17 ratio         PTT - ( 18 Nov 2024 04:39 )  PTT:51.5 sec  Urinalysis Basic - ( 18 Nov 2024 04:39 )    Color: x / Appearance: x / SG: x / pH: x  Gluc: 100 mg/dL / Ketone: x  / Bili: x / Urobili: x   Blood: x / Protein: x / Nitrite: x   Leuk Esterase: x / RBC: x / WBC x   Sq Epi: x / Non Sq Epi: x / Bacteria: x      ABG - ( 16 Nov 2024 12:21 )  pH, Arterial: 7.50  pH, Blood: x     /  pCO2: 38    /  pO2: 219   / HCO3: 30    / Base Excess: 6.0   /  SaO2: 96.6                      RADIOLOGY:  CXR  Xray Chest 1 View- PORTABLE-Urgent:   ACC: [id number] EXAM:  XR CHEST PORTABLE URGENT 1V   ORDERED BY: CEDRICK CHEEK     PROCEDURE DATE:  11/16/2024          INTERPRETATION:  CLINICAL HISTORY: sob.    COMPARISON: 11/16/2024.    TECHNIQUE: Frontal Adequate positioning.    FINDINGS:    Support devices: Central venous line superior vena cava    Cardiac/mediastinum/hilum: Stable.    Lung parenchyma/Pleura: Left lower lobe opacity/pleural effusion   unchanged. No air leak.    Skeleton/soft tissues: Stable.      IMPRESSION:    Left lower lobeopacity/pleural effusion unchanged. No air leak.    --- End of Report ---            RAYSA SEGURA MD; Attending Radiologist  This document has been electronically signed. Nov 16 2024  2:49PM (11-16-24 @ 11:39)  Xray Chest 1 View- PORTABLE-Urgent:   ACC: [id number] EXAM:  XR CHEST PORTABLE URGENT 1V   ORDERED BY: CEDRICK CHEEK     PROCEDURE DATE:  11/15/2024          INTERPRETATION:  CLINICAL HISTORY: ng tube replacement.    COMPARISON: November 15, 2024.    TECHNIQUE: Portable frontal chest radiograph. Limited secondary to low   lung volume and the apices cut off on the film.    FINDINGS:    Support devices: NGT with its tip overlying the right base and should be   removed and reinserted.    Cardiac/mediastinum/hilum: Stable.    Lung parenchyma/Pleura: Bilateral opacities, unchanged. No pneumothorax   is seen.    Skeleton/soft tissues: Stable.      IMPRESSION: Limited as above.    Bilateral opacities, unchanged.    NGT as above.    --- End of Report ---            MARYANN WHITING MD; Attending Radiologist  This document has been electronically signed. Nov 15 2024  6:11PM (11-15-24 @ 18:03)      CT

## 2024-11-18 NOTE — CHART NOTE - NSCHARTNOTEFT_GEN_A_CORE
Contacted by surgery re pt scheduled surgery tomorrow at 10:00. Requested lovenox held. Lovenox held. Contacted by surgery- pt is added on for trach and PEG tomorrow. Requested lovenox held. Lovenox held.

## 2024-11-18 NOTE — CONSULT NOTE ADULT - SUBJECTIVE AND OBJECTIVE BOX
GENERAL SURGERY CONSULT NOTE    Patient: PATRICIA SPRAGUE , 73y ([birth year])Female   MRN: [id number]  Location: [address]  Visit: 11-03-24 Inpatient  Date: 11-18-24 @ 17:07    HPI:  73-year-old female PMH A-fib on xarelto, HTN, s/p cholecystectomy, congenital solitary kidney presents from NH to the ED for evaluation of weakness, decreased PO intake and abdominal distension. Prior to ED presentation, pt had not had bowel movement in 3 days, pt unsure if she's been passing gas otherwise. She was noted to be hypotensive and have an elevated wbc count at the NH as well. No dysuria, chest pain, SOB, cough or fever per pt otherwise.  No other complaints currently.   In the ED, pt was hypotensive BP 66/45 S/P LR 2800cc bolus with no improvement in BP, started on peripheral levo. Labs with wbc 19.29, vbg lactate maximum 3.2 which resolved , pH 7.2, pCO2 45, Na 133, AG 21, Cr 4.3 (baseline 1.1), UA contaminated. CTAP with severe gastric distension, multiple foci of intraperitoneal free air and urinary bladder thickening with free air. Surgery consulted, no intervention at that time, NGT placed for gastric decompression with 3.1L feculent output.  Pt was initially on BIPAP switched to HFNC, due to increase respiratory distress patient was intubated on 11/6, pt was later extubated. However, pt appear weak, cough with secretions, tachypneic discussion was made with family and primary team to intubate patient with early tracheostomy. Pt was examined at bedside, ventilator settings 100/8 with ETT 24cm from LL, OGT in place. Of note patient failed multiple speech and swallow exams, attempts were made to replace NG by MICU and ENT without success.     Thoracic surgery consulted for PEG and tracheostomy tube placement.     PAST MEDICAL & SURGICAL HISTORY:  HTN (hypertension)  Diabetes mellitus  Obesity  Gastroesophageal reflux disease  Active asthma  Generalized OA  Afib  H/O hernia repair 2011 and revised in 2013  History of cholecystectomy    Home Medications:  acetaminophen 325 mg oral tablet: 2 tab(s) orally every 6 hours, As Needed - for fever, for moderate pain (03 Nov 2024 20:02)  alendronate 70 mg oral tablet: 1 tab(s) orally once a day (03 Nov 2024 20:01)  ascorbic acid 500 mg oral tablet: 1 tab(s) orally once a day (03 Nov 2024 20:01)  cholecalciferol oral tablet: 400 unit(s) orally once a day (03 Nov 2024 20:02)  Cozaar 100 mg oral tablet: 1 tab(s) orally once a day (03 Nov 2024 20:02)  cyclobenzaprine 10 mg oral tablet: 1 tab(s) orally once a day (03 Nov 2024 20:02)  famotidine 20 mg oral tablet: 1 tab(s) orally once a day (03 Nov 2024 20:02)  ibuprofen 400 mg oral tablet: 1 tab(s) orally once a day as needed for  moderate pain (03 Nov 2024 20:02)  labetalol 100 mg oral tablet: 1 tab(s) orally 3 times a day (03 Nov 2024 21:49)  loperamide 2 mg oral capsule: 1 cap(s) orally every 12 hours  ADMINISTER BEFORE MEALS  STOP AFTER 2 DOSES (03 Nov 2024 20:02)  magnesium hydroxide 400 mg oral tablet, chewable: orally once a day (03 Nov 2024 20:02)  Nifedical XL 60 mg oral tablet, extended release: 1 tab(s) orally once a day (03 Nov 2024 20:02)  Percocet 10 mg-325 mg oral tablet: 1 tab(s) orally every 8 hours as needed for  severe pain (03 Nov 2024 20:02)  polyethylene glycol 3350 oral powder for reconstitution: 17 gram(s) orally once a day (03 Nov 2024 20:02)  rivaroxaban 15 mg oral tablet: 1 tab(s) orally once a day (03 Nov 2024 19:55)  saccharomyces boulardii lyo 250 mg oral capsule: 1 cap(s) orally 2 times a day (03 Nov 2024 20:02)        VITALS:  T(F): 99.9 (11-18-24 @ 16:00), Max: 99.9 (11-17-24 @ 20:00)  HR: 81 (11-18-24 @ 16:30) (60 - 98)  BP: 96/54 (11-18-24 @ 16:00) (96/54 - 207/80)  RR: 22 (11-18-24 @ 16:00) (22 - 50)  SpO2: 95% (11-18-24 @ 16:30) (88% - 100%)    PHYSICAL EXAM:  General: intubated and sedated  Cardiac: RRR S1, S2   Respiratory: CTAB, normal respiratory effort   Abdomen: Soft, non-distended, non-tender; previous PEG tube scar   Musculoskeletal:  compartments soft  Vascular: extremities well perfused  Skin: Warm/dry, normal color    MEDICATIONS  (STANDING):  albuterol    0.083% 2.5 milliGRAM(s) Nebulizer every 6 hours  albuterol    90 MICROgram(s) HFA Inhaler 1 Puff(s) Inhalation every 4 hours  amLODIPine   Tablet 10 milliGRAM(s) Oral daily  chlorhexidine 0.12% Liquid 15 milliLiter(s) Oral Mucosa every 12 hours  chlorhexidine 2% Cloths 1 Application(s) Topical <User Schedule>  dexMEDEtomidine Infusion 0.2 MICROgram(s)/kG/Hr (4.58 mL/Hr) IV Continuous <Continuous>  dextrose 5% + lactated ringers. 1000 milliLiter(s) (75 mL/Hr) IV Continuous <Continuous>  enoxaparin Injectable 90 milliGRAM(s) SubCutaneous every 12 hours  fentaNYL   Infusion 0.5 MICROgram(s)/kG/Hr (4.58 mL/Hr) IV Continuous <Continuous>  losartan 50 milliGRAM(s) Oral daily  nystatin Cream 1 Application(s) Topical two times a day  pantoprazole  Injectable 40 milliGRAM(s) IV Push two times a day  potassium phosphate IVPB 15 milliMole(s) IV Intermittent once  propofol Infusion 5 MICROgram(s)/kG/Min (2.75 mL/Hr) IV Continuous <Continuous>  sodium chloride 0.9% for Nebulization 3 milliLiter(s) Nebulizer daily  sodium chloride 3%  Inhalation 4 milliLiter(s) Inhalation every 12 hours    MEDICATIONS  (PRN):  acetaminophen     Tablet .. 650 milliGRAM(s) Oral every 6 hours PRN Temp greater or equal to 38C (100.4F)  hydrALAZINE Injectable 10 milliGRAM(s) IV Push every 8 hours PRN SBP>180      LAB/STUDIES:                        8.0    8.57  )-----------( 200      ( 18 Nov 2024 04:39 )             26.0     11-18    143  |  110  |  10  ----------------------------<  100[H]  3.6   |  25  |  0.7    Ca    10.3      18 Nov 2024 04:39  Phos  1.9     11-18  Mg     1.7     11-18    TPro  5.2[L]  /  Alb  2.6[L]  /  TBili  0.5  /  DBili  x   /  AST  10  /  ALT  9   /  AlkPhos  102  11-18    PT/INR - ( 18 Nov 2024 04:39 )   PT: 13.90 sec;   INR: 1.17 ratio         PTT - ( 18 Nov 2024 04:39 )  PTT:51.5 sec  LIVER FUNCTIONS - ( 18 Nov 2024 04:39 )  Alb: 2.6 g/dL / Pro: 5.2 g/dL / ALK PHOS: 102 U/L / ALT: 9 U/L / AST: 10 U/L / GGT: x           Urinalysis Basic - ( 18 Nov 2024 04:39 )    Color: x / Appearance: x / SG: x / pH: x  Gluc: 100 mg/dL / Ketone: x  / Bili: x / Urobili: x   Blood: x / Protein: x / Nitrite: x   Leuk Esterase: x / RBC: x / WBC x   Sq Epi: x / Non Sq Epi: x / Bacteria: x              ABG - ( 18 Nov 2024 16:43 )  pH, Arterial: 7.43  pH, Blood: x     /  pCO2: 43    /  pO2: 164   / HCO3: 28    / Base Excess: 3.8   /  SaO2: 97.8          IMAGING:  < from: Xray Chest 1 View- PORTABLE-Urgent (Xray Chest 1 View- PORTABLE-Urgent .) (11.18.24 @ 13:56) >    IMPRESSION:    Improvement in opacification of the left hemothorax Pleural effusion   and/or opacifications may be present. Redemonstrated right-sided   infiltrates.    < end of copied text >

## 2024-11-18 NOTE — CONSULT NOTE ADULT - ASSESSMENT
ASSESSMENT:  73-year-old female PMH A-fib on xarelto,, HTN, s/p cholecystectomy, congenital solitary kidney presents from NH to the ED for evaluation of weakness, decreased PO intake and abdominal distension.   Admitted to ICU for septic shock 2/2 ESBL proteus (UTI vs. GI source) complicated by gastric distension w/ pneumatosis, NOLA 2/2 ATN, hypernatremia. s/p NGT decompression (-3.1 L feculent output) in ED. Intubated 11/6 for AHRF. EGD 11/8 w/ multiple ulcers, largest 10cm), path +gastritis. Now with new R colon/cecal mass pending colonoscopy when more stable. Extubated 11/13 to AVAPs, weaned to HFNC however transitioned back to BiPAP after desaturation. Reintubated 11/18. Failed S&S multiple times. Thoracic surgery consulted for tracheostomy and PEG tube placement.   Physical exam findings, imaging, and labs as documented above.     PLAN:  - add on 11/19 for tracheostomy and PEG tube placement, consent is in the chart  - f/u pre-op labs  - NPO after midnight  - IVF  - DVT/GI PPX  - rest of care per primary team    Above plan discussed with Attending Surgeon Dr. Nikita Bauer , patient, patient family, and Primary team  11-18-24 @ 17:07

## 2024-11-19 DIAGNOSIS — R62.7 ADULT FAILURE TO THRIVE: ICD-10-CM

## 2024-11-19 LAB
ALBUMIN SERPL ELPH-MCNC: 2.8 G/DL — LOW (ref 3.5–5.2)
ALP SERPL-CCNC: 110 U/L — SIGNIFICANT CHANGE UP (ref 30–115)
ALT FLD-CCNC: 11 U/L — SIGNIFICANT CHANGE UP (ref 0–41)
ANION GAP SERPL CALC-SCNC: 9 MMOL/L — SIGNIFICANT CHANGE UP (ref 7–14)
AST SERPL-CCNC: 17 U/L — SIGNIFICANT CHANGE UP (ref 0–41)
BASE EXCESS BLDA CALC-SCNC: 3 MMOL/L — SIGNIFICANT CHANGE UP (ref -2–3)
BILIRUB SERPL-MCNC: 0.5 MG/DL — SIGNIFICANT CHANGE UP (ref 0.2–1.2)
BUN SERPL-MCNC: 11 MG/DL — SIGNIFICANT CHANGE UP (ref 10–20)
CALCIUM SERPL-MCNC: 10.4 MG/DL — SIGNIFICANT CHANGE UP (ref 8.4–10.5)
CHLORIDE SERPL-SCNC: 114 MMOL/L — HIGH (ref 98–110)
CO2 SERPL-SCNC: 24 MMOL/L — SIGNIFICANT CHANGE UP (ref 17–32)
CREAT SERPL-MCNC: 0.8 MG/DL — SIGNIFICANT CHANGE UP (ref 0.7–1.5)
EGFR: 79 ML/MIN/1.73M2 — SIGNIFICANT CHANGE UP
GLUCOSE BLDC GLUCOMTR-MCNC: 78 MG/DL — SIGNIFICANT CHANGE UP (ref 70–99)
GLUCOSE BLDC GLUCOMTR-MCNC: 84 MG/DL — SIGNIFICANT CHANGE UP (ref 70–99)
GLUCOSE BLDC GLUCOMTR-MCNC: 85 MG/DL — SIGNIFICANT CHANGE UP (ref 70–99)
GLUCOSE BLDC GLUCOMTR-MCNC: 86 MG/DL — SIGNIFICANT CHANGE UP (ref 70–99)
GLUCOSE BLDC GLUCOMTR-MCNC: 90 MG/DL — SIGNIFICANT CHANGE UP (ref 70–99)
GLUCOSE BLDC GLUCOMTR-MCNC: 91 MG/DL — SIGNIFICANT CHANGE UP (ref 70–99)
GLUCOSE SERPL-MCNC: 82 MG/DL — SIGNIFICANT CHANGE UP (ref 70–99)
GRAM STN FLD: SIGNIFICANT CHANGE UP
HCO3 BLDA-SCNC: 29 MMOL/L — HIGH (ref 21–28)
HCT VFR BLD CALC: 26.7 % — LOW (ref 37–47)
HGB BLD-MCNC: 8.3 G/DL — LOW (ref 12–16)
HOROWITZ INDEX BLDA+IHG-RTO: 60 — SIGNIFICANT CHANGE UP
MAGNESIUM SERPL-MCNC: 2 MG/DL — SIGNIFICANT CHANGE UP (ref 1.8–2.4)
MCHC RBC-ENTMCNC: 30 PG — SIGNIFICANT CHANGE UP (ref 27–31)
MCHC RBC-ENTMCNC: 31.1 G/DL — LOW (ref 32–37)
MCV RBC AUTO: 96.4 FL — SIGNIFICANT CHANGE UP (ref 81–99)
NRBC # BLD: 0 /100 WBCS — SIGNIFICANT CHANGE UP (ref 0–0)
PCO2 BLDA: 49 MMHG — HIGH (ref 32–45)
PH BLDA: 7.38 — SIGNIFICANT CHANGE UP (ref 7.35–7.45)
PHOSPHATE SERPL-MCNC: 2.5 MG/DL — SIGNIFICANT CHANGE UP (ref 2.1–4.9)
PLATELET # BLD AUTO: 250 K/UL — SIGNIFICANT CHANGE UP (ref 130–400)
PMV BLD: 11.9 FL — HIGH (ref 7.4–10.4)
PO2 BLDA: 120 MMHG — HIGH (ref 83–108)
POTASSIUM SERPL-MCNC: 4.1 MMOL/L — SIGNIFICANT CHANGE UP (ref 3.5–5)
POTASSIUM SERPL-SCNC: 4.1 MMOL/L — SIGNIFICANT CHANGE UP (ref 3.5–5)
PROT SERPL-MCNC: 5 G/DL — LOW (ref 6–8)
RBC # BLD: 2.77 M/UL — LOW (ref 4.2–5.4)
RBC # FLD: 13.2 % — SIGNIFICANT CHANGE UP (ref 11.5–14.5)
SAO2 % BLDA: 97.4 % — SIGNIFICANT CHANGE UP (ref 94–98)
SODIUM SERPL-SCNC: 147 MMOL/L — HIGH (ref 135–146)
SPECIMEN SOURCE: SIGNIFICANT CHANGE UP
WBC # BLD: 10.29 K/UL — SIGNIFICANT CHANGE UP (ref 4.8–10.8)
WBC # FLD AUTO: 10.29 K/UL — SIGNIFICANT CHANGE UP (ref 4.8–10.8)

## 2024-11-19 PROCEDURE — 71045 X-RAY EXAM CHEST 1 VIEW: CPT | Mod: 26

## 2024-11-19 PROCEDURE — 43246 EGD PLACE GASTROSTOMY TUBE: CPT

## 2024-11-19 PROCEDURE — 71045 X-RAY EXAM CHEST 1 VIEW: CPT | Mod: 26,77

## 2024-11-19 PROCEDURE — 31624 DX BRONCHOSCOPE/LAVAGE: CPT

## 2024-11-19 PROCEDURE — 31600 PLANNED TRACHEOSTOMY: CPT

## 2024-11-19 PROCEDURE — 99291 CRITICAL CARE FIRST HOUR: CPT | Mod: 25

## 2024-11-19 PROCEDURE — 99233 SBSQ HOSP IP/OBS HIGH 50: CPT

## 2024-11-19 RX ORDER — 0.9 % SODIUM CHLORIDE 0.9 %
1000 INTRAVENOUS SOLUTION INTRAVENOUS
Refills: 0 | Status: DISCONTINUED | OUTPATIENT
Start: 2024-11-19 | End: 2024-11-21

## 2024-11-19 RX ORDER — ALBUTEROL 90 MCG
2 AEROSOL (GRAM) INHALATION EVERY 6 HOURS
Refills: 0 | Status: DISCONTINUED | OUTPATIENT
Start: 2024-11-19 | End: 2024-12-09

## 2024-11-19 RX ADMIN — CHLORHEXIDINE GLUCONATE 15 MILLILITER(S): 1.2 RINSE ORAL at 17:05

## 2024-11-19 RX ADMIN — PROPOFOL 2.75 MICROGRAM(S)/KG/MIN: 10 INJECTION, EMULSION INTRAVENOUS at 05:57

## 2024-11-19 RX ADMIN — AMLODIPINE BESYLATE 10 MILLIGRAM(S): 10 TABLET ORAL at 05:57

## 2024-11-19 RX ADMIN — FENTANYL 4.58 MICROGRAM(S)/KG/HR: 12 PATCH, EXTENDED RELEASE TRANSDERMAL at 04:05

## 2024-11-19 RX ADMIN — FENTANYL 4.58 MICROGRAM(S)/KG/HR: 12 PATCH, EXTENDED RELEASE TRANSDERMAL at 23:22

## 2024-11-19 RX ADMIN — PANTOPRAZOLE SODIUM 40 MILLIGRAM(S): 40 TABLET, DELAYED RELEASE ORAL at 05:57

## 2024-11-19 RX ADMIN — CHLORHEXIDINE GLUCONATE 15 MILLILITER(S): 1.2 RINSE ORAL at 05:58

## 2024-11-19 RX ADMIN — PANTOPRAZOLE SODIUM 40 MILLIGRAM(S): 40 TABLET, DELAYED RELEASE ORAL at 17:05

## 2024-11-19 RX ADMIN — NYSTATIN 1 APPLICATION(S): 100000 POWDER TOPICAL at 17:04

## 2024-11-19 RX ADMIN — CHLORHEXIDINE GLUCONATE 1 APPLICATION(S): 1.2 RINSE ORAL at 05:58

## 2024-11-19 RX ADMIN — NYSTATIN 1 APPLICATION(S): 100000 POWDER TOPICAL at 05:58

## 2024-11-19 RX ADMIN — Medication 2 MILLIGRAM(S): at 10:55

## 2024-11-19 RX ADMIN — LOSARTAN POTASSIUM 50 MILLIGRAM(S): 100 TABLET, FILM COATED ORAL at 05:57

## 2024-11-19 NOTE — PROGRESS NOTE ADULT - ASSESSMENT
73-year-old female PMH A-fib on xarelto,, HTN, s/p cholecystectomy, congenital solitary kidney presents from NH to the ED for evaluation of weakness, decreased PO intake and abdominal distension. Admitted to ICU for septic shock 2/2 ESBL proteus (UTI vs. GI source) complicated by gastric distension w/ pneumatosis, NOLA 2/2 ATN, hypernatremia. s/p NGT decompression (-3.1 L feculent output) in ED. Intubated 11/6 for AHRF. EGD 11/8 w/ multipl ulcers, largest 10cm) pending pathology. Now with new R colon/cecal mass pending colonoscopy when more stable.     Saw patient at bedside. Patient was intubated and sedated. Family spoke with the primary team yesterday and ended up deciding for trach & PEG. Patient going to OR today for procedure. Spoke to daughter on the phone and she maintained that she wanted full code for the patient.     Signing off at this time as goals are clear with plan of care in place.  Please call [phone] with questions or concerns 24/7.

## 2024-11-19 NOTE — CHART NOTE - NSCHARTNOTEFT_GEN_A_CORE
Registered Dietitian Follow-Up     Patient Profile Reviewed                           Yes [x]   No []     Nutrition History Previously Obtained        Yes []  No [x]       Pertinent Subjective Information: Pt currently NPO at this time for planned trach and PEG procedure.     Pertinent Medical Information: 73-year-old female PMH A-fib on xarelto,, HTN, s/p cholecystectomy, congenital solitary kidney presents from NH to the ED for evaluation of weakness, decreased PO intake and abdominal distension.  # Acute respiratory failure with hypoxia  - Intubated , extubated  to AVAPS  now re-intubated and sedated , plan for trach & PEG   # Pneumonia, aspiration  # Dysphagia - plan for trach & PEG  # HAGMA 2/2 lactic acidosis and uremia - resolved   # NOLA likely ATN 2/2 septic shock - resolved      Diet order: Diet, NPO:   Except Medications (24 @ 00:18) [Active]    Anthropometrics:  Height (cm): 167.6 (24 @ 10:58)  Weight (kg): 91.6 (24 @ 10:58)  BMI (kg/m2): 32.6 (24 @ 10:58)  IBW: 59 KG     Daily Weight in k.4 (11-19), Weight in k.6 (11-18), Weight in k.1 (11-17), Weight in k.3 (11-16), Weight in k.7 (11-15), Weight in k.1 (11-14), Weight in k.7 (11-13) -- edema 1+ noted per flow sheet     MEDICATIONS  (STANDING):  amLODIPine   Tablet 10 milliGRAM(s) Oral daily  chlorhexidine 0.12% Liquid 15 milliLiter(s) Oral Mucosa every 12 hours  chlorhexidine 2% Cloths 1 Application(s) Topical <User Schedule>  fentaNYL   Infusion 0.5 MICROgram(s)/kG/Hr (4.58 mL/Hr) IV Continuous <Continuous>  lactated ringers. 1000 milliLiter(s) (50 mL/Hr) IV Continuous <Continuous>  losartan 50 milliGRAM(s) Oral daily  nystatin Cream 1 Application(s) Topical two times a day  pantoprazole  Injectable 40 milliGRAM(s) IV Push two times a day  propofol Infusion 5 MICROgram(s)/kG/Min (2.75 mL/Hr) IV Continuous <Continuous>    MEDICATIONS  (PRN):  acetaminophen     Tablet .. 650 milliGRAM(s) Oral every 6 hours PRN Temp greater or equal to 38C (100.4F)  albuterol    90 MICROgram(s) HFA Inhaler 2 Puff(s) Inhalation every 6 hours PRN Shortness of Breath and/or Wheezing  hydrALAZINE Injectable 10 milliGRAM(s) IV Push every 8 hours PRN SBP>180    Pertinent Labs:  @ 06:10: Na 147[H], BUN 11, Cr 0.8, BG 82, K+ 4.1, Phos 2.5, Mg 2.0, Alk Phos 110, ALT/SGPT 11, AST/SGOT 17, HbA1c --   @ 22:45: Na 145, BUN 11, Cr 0.8, BG 90, K+ 3.9, Phos 2.4, Mg 2.0, Alk Phos 104, ALT/SGPT 11, AST/SGOT 16, HbA1c --    Finger Sticks:  POCT Blood Glucose.: 78 mg/dL ( @ 12:30)  POCT Blood Glucose.: 91 mg/dL ( @ 08:18)  POCT Blood Glucose.: 90 mg/dL ( @ 06:12)  POCT Blood Glucose.: 85 mg/dL ( @ 02:31)  POCT Blood Glucose.: 119 mg/dL ( @ 15:07)    Physical Findings:  - Appearance: intubated  - GI function: last BM on   - Tubes: n/a - planned for trach and PEG  - Oral/Mouth cavity: NPO w/ TF  - Skin: SDTI to bilateral buttocks  - Edema: generalized edema 1+     Nutrition Requirements:  Weight Used: 91.6 KG Using ABW -- with consideration for age, BMI, intubated, propofol 8.2 mL/hr -- 216.48 additional kcal, Ve 5, Tmax 36.6 C    Estimated Energy Needs    Continue []  Adjust [x]  ENERGY: PSE () 1373.73 kcal/day vs. 1491-3149 kcal/day (11-14 kcal/kg)     Estimated Protein Needs    Continue [x]  Adjust []  PROTEIN:  g/day (1.5-2 g/kg IBW 59 KG)     Estimated Fluid Needs        Continue [x]  Adjust []  FLUID: 8727-5967 mL/day (25-30 mL/kg)     Nutrient Intake: Current TF regimen provides -- 1188 kcal, 59.4g pro, 797 mL free water from formula      [x] Previous Nutrition Diagnosis: Inadequate Protein Energy Intake              [x] Ongoing          [] Resolved     Nutrition Education: Deferred at this time     Goal/Expected Outcome: Pt to meet >79% of estimated needs within 3-5 days     Indicator/Monitoring: Diet order, weights, labs, NFPF, body composition, BM and tolerance to TF regimen    Recommendation:  1. Once medically feasible to start TFs and trach/PEG is placed, recommend: Jevity 1.2 via PEG, total volume: 1000 mL, bolus of 250 mL q6hrs. Start rate at 120 mL and increase as tolerated until goal rate of 250 mL is met. ADD no carb prosource 1X/DAILY -- provides 60 kcal, 15g pro total. TF regimen + propofol (216.48 kcal) + no carb prosource to provide -- 1474 kcal, 70.5g pro, 807 mL free water from formula + additional flushes of 50 mL q4hrs -- team to adjust water flushes prn. Bolus for aspiration precautions.     High risk f/u    RD to remain available: Jennifer Rios [phone] or TEAMS

## 2024-11-19 NOTE — BRIEF OPERATIVE NOTE - COMMENTS
ok for G tube use for medications tonight, J tube can be used for trickle feeds starting tomorrow morning. Hold AC for 48 hours

## 2024-11-19 NOTE — CHART NOTE - NSCHARTNOTEFT_GEN_A_CORE
Post Operative Note  Patient: PATRICIA SPRAGUE 73y (1950) Female   MRN: [id number]  Location: [address]  Visit: 11-03-24 Inpatient  Date: 11-19-24 @ 17:27    Procedure: Mucus plug in respiratory tract    Adult failure to thrive    Acute respiratory failure with hypoxia     S/P Creation of tracheostomy with insertion of gastrostomy tube and feeding jejunostomy tube    Subjective:   Nausea: no, Vomiting: no, Ambulating: no, Flatus: no  Pain Assessment: Patient is complaining of no pain that is appropriate for post-operative course.     Objective:  Vitals: T(F): 98.3 (11-19-24 @ 16:00), Max: 98.3 (11-19-24 @ 16:00)  HR: 76 (11-19-24 @ 17:00)  BP: 134/61 (11-19-24 @ 17:00) (90/49 - 145/63)  RR: 15 (11-19-24 @ 17:00)  SpO2: 98% (11-19-24 @ 17:00)  Vent Settings: Mode: AC/ CMV (Assist Control/ Continuous Mandatory Ventilation), RR (machine): 14, TV (machine): 360, FiO2: 70, PEEP: 10, MAP: 13, PIP: 23    In:   11-18-24 @ 07:01  -  11-19-24 @ 07:00  --------------------------------------------------------  IN: 1487.8 mL    11-19-24 @ 07:01  -  11-19-24 @ 17:27  --------------------------------------------------------  IN: 772.7 mL      IV Fluids: lactated ringers. 1000 milliLiter(s) (50 mL/Hr) IV Continuous <Continuous>      Out:   11-18-24 @ 07:01  -  11-19-24 @ 07:00  --------------------------------------------------------  OUT: 1600 mL    11-19-24 @ 07:01  -  11-19-24 @ 17:27  --------------------------------------------------------  OUT: 400 mL      EBL:     Voided Urine:   11-18-24 @ 07:01  -  11-19-24 @ 07:00  --------------------------------------------------------  OUT: 1600 mL    11-19-24 @ 07:01  -  11-19-24 @ 17:27  --------------------------------------------------------  OUT: 400 mL      Burger Catheter: yes no   Drains:   BULMARO:    ,   Chest Tube:      NG Tube:       PHYSICAL EXAM:  General: NAD, trach collar  HEENT:  EOMI  Cardiac: RRR  Respiratory: Tracheostomy in place, no signs on active bleeding, on a vent 50/10.   Abdomen: Soft, non-distended. PEG-J tube in place. 2cm at the skin, 3cm at the bumper. NO active bleeding or signs of infection.    Medications: [Standing]  acetaminophen     Tablet .. 650 milliGRAM(s) Oral every 6 hours PRN  albuterol    90 MICROgram(s) HFA Inhaler 2 Puff(s) Inhalation every 6 hours PRN  amLODIPine   Tablet 10 milliGRAM(s) Oral daily  chlorhexidine 0.12% Liquid 15 milliLiter(s) Oral Mucosa every 12 hours  chlorhexidine 2% Cloths 1 Application(s) Topical <User Schedule>  fentaNYL   Infusion 0.5 MICROgram(s)/kG/Hr IV Continuous <Continuous>  hydrALAZINE Injectable 10 milliGRAM(s) IV Push every 8 hours PRN  lactated ringers. 1000 milliLiter(s) IV Continuous <Continuous>  losartan 50 milliGRAM(s) Oral daily  nystatin Cream 1 Application(s) Topical two times a day  pantoprazole  Injectable 40 milliGRAM(s) IV Push two times a day  propofol Infusion 5 MICROgram(s)/kG/Min IV Continuous <Continuous>    Medications: [PRN]  acetaminophen     Tablet .. 650 milliGRAM(s) Oral every 6 hours PRN  albuterol    90 MICROgram(s) HFA Inhaler 2 Puff(s) Inhalation every 6 hours PRN  amLODIPine   Tablet 10 milliGRAM(s) Oral daily  chlorhexidine 0.12% Liquid 15 milliLiter(s) Oral Mucosa every 12 hours  chlorhexidine 2% Cloths 1 Application(s) Topical <User Schedule>  fentaNYL   Infusion 0.5 MICROgram(s)/kG/Hr IV Continuous <Continuous>  hydrALAZINE Injectable 10 milliGRAM(s) IV Push every 8 hours PRN  lactated ringers. 1000 milliLiter(s) IV Continuous <Continuous>  losartan 50 milliGRAM(s) Oral daily  nystatin Cream 1 Application(s) Topical two times a day  pantoprazole  Injectable 40 milliGRAM(s) IV Push two times a day  propofol Infusion 5 MICROgram(s)/kG/Min IV Continuous <Continuous>      DVT PROPHYLAXIS:   GI PROPHYLAXIS: pantoprazole  Injectable 40 milliGRAM(s) IV Push two times a day    ANTICOAGULATION:   ANTIBIOTICS:        Labs:                        8.3    10.29 )-----------( 250      ( 19 Nov 2024 06:10 )             26.7     11-19    147[H]  |  114[H]  |  11  ----------------------------<  82  4.1   |  24  |  0.8    Ca    10.4      19 Nov 2024 06:10  Phos  2.5     11-19  Mg     2.0     11-19    TPro  5.0[L]  /  Alb  2.8[L]  /  TBili  0.5  /  DBili  x   /  AST  17  /  ALT  11  /  AlkPhos  110  11-19    PT/INR - ( 18 Nov 2024 04:39 )   PT: 13.90 sec;   INR: 1.17 ratio         PTT - ( 18 Nov 2024 22:45 )  PTT:33.2 sec    Imaging:  No post-op imaging studies    Assessment:  73yF s/p Creation of tracheostomy with insertion of gastrostomy tube and feeding jejunostomy tube    Plan:  - G tube for meds tonight  - J tube ok for trickle feeds starting tomorrow 11/20  - Hold AC for 48 hours.   - 2cm at the skin, 3 cm at the bumper.   - Rest of care as per primary team      Date/Time: 11-19-24 @ 17:27

## 2024-11-19 NOTE — PROGRESS NOTE ADULT - PROBLEM SELECTOR PLAN 3
- plan for trach & PEG
- d5LR while NPO  - NGT placement - failed multiple times by ICU staff and ENT, will try again today

## 2024-11-19 NOTE — SWALLOW FEES ASSESSMENT ADULT - DIAGNOSTIC IMPRESSIONS
Severe to profound pharyngeal dysphagia w/ silent aspiration
Severe pharyngeal dysphagia for puree, mildly thick and moderately thick liquids
0 = swallows foods/liquids without difficulty

## 2024-11-19 NOTE — PROGRESS NOTE ADULT - ASSESSMENT
73-year-old female PMH A-fib on xarelto,, HTN, s/p cholecystectomy, congenital solitary kidney presents from NH to the ED for evaluation of weakness, decreased PO intake and abdominal distension.   Admitted to ICU for septic shock 2/2 ESBL proteus (UTI vs. GI source) complicated by gastric distension w/ pneumatosis, NOLA 2/2 ATN, hypernatremia. s/p NGT decompression (-3.1 L feculent output) in ED. Intubated 11/6 for AHRF. EGD 11/8 w/ multiple ulcers, largest 10cm), path +gastritis. Now with new R colon/cecal mass pending colonoscopy when more stable.   Extubated 11/13 to AVAPs, weaned to HFNC however transitioned back to BiPAP after desaturation. Patient was weak and unable to clear secretions despite non-invasive measures. Reintubated 11/18 PM, going for trach/PEG today with CTSx.     #AHRF 2/2 septic shock due to ESBL proteus (UTI Vs. GI source) s/p intubation x2, plan for trach today  #HAGMA 2/2 lactic acidosis and uremia - resolved   #NOLA likely ATN 2/2 septic shock - resolved   #Possible aspiration  #Congenital solitary kidney  - Intubated 11/6, extubated 11/13 to AVAPS, re-intubated 11/18  - sedated on fent/propofol  - UCx +ESBL proteus, BCX (-), trach culture with resp rosalva, sputum cx 11/12 resp rosalva  - s/p abx (freddie)  - c/w mechanical ventilation  - daily SAT  - bronch today    #Dysphagia 2/2 critical illness  - S&S eval - failed  - NGT placement - failed multiple times by ICU staff and ENT, will try again today  - PEG today, f/u when ok to start feeds  - LR@50cc/hr while NPO    #Abdominal distension w/ pneumatosis   #R colon/cecal mass on CT A/P  #Diarrhea   - NGT placed for decompression in ED with 3.1L feculent output   - GI recs, surgery recs, vascular recs appreciated   - s/p EGD 11/8 with multiple ulcers, erosive gastritis  - EGD path: chronic gastritis (-) Hpylori, no malignancy   - CT A/P with PO contrast w/ suspicious hepatic flexure narrowing and lobulated soft tissue density in R colon/cecum.   - TSH wnl  - IV PPI BID   - colonoscopy when more stable as per GI    #Hypernatremia  - c/w LR  - monitor Na    #Sacral DTI  - offloading, wound care, pressure injury prevention    #Afib  - hold xarelto   - c/w tx lovenox 1mg/kg q12h (holding for procedure)    #HTN  - amlodipine 10mg qd PO, losartan 50mg qd PO home meds   - PRN hydral for SBP >180 while npo    ---------------------  DVT ppx: tx lovenox   Diet: NPO, f/u surgery when ok to start feeding  GI ppx/Bowel regimen: PPI BID  Activity: bedrest  GOC: full code  Dispo: ICU  #Summary/Handoff:  - trach/PEG, LR @50cc/hr, bronchoscopy

## 2024-11-19 NOTE — PROGRESS NOTE ADULT - SUBJECTIVE AND OBJECTIVE BOX
Patient is a 73y old  Female who presents with a chief complaint of abd pain (15 Nov 2024 06:45)        Over Night Events:  Events noted.  intubated yesterday.  Off pressors.  Sedated.          ROS:     All ROS are negative except HPI         PHYSICAL EXAM    ICU Vital Signs Last 24 Hrs  T(C): 36.8 (19 Nov 2024 08:00), Max: 37.7 (18 Nov 2024 16:00)  T(F): 98.2 (19 Nov 2024 08:00), Max: 99.9 (18 Nov 2024 16:00)  HR: 75 (19 Nov 2024 07:50) (52 - 98)  BP: 110/56 (19 Nov 2024 07:00) (88/53 - 207/80)  BP(mean): 80 (19 Nov 2024 07:00) (63 - 115)  ABP: --  ABP(mean): --  RR: 14 (19 Nov 2024 07:00) (14 - 50)  SpO2: 98% (19 Nov 2024 07:50) (88% - 100%)    O2 Parameters below as of 19 Nov 2024 08:00  Patient On (Oxygen Delivery Method): ventilator            CONSTITUTIONAL:    NAD    ENT:   Airway patent,   Mouth with normal mucosa.   ET     EYES:   Pupils equal,   Round and reactive to light.    CARDIAC:   Normal rate,   Regular rhythm.        RESPIRATORY:   No wheezing  Bilateral BS  Normal chest expansion  Not tachypneic,  No use of accessory muscles    GASTROINTESTINAL:  Abdomen soft,   Non-tender,   No guarding,   + BS    MUSCULOSKELETAL:   Range of motion is not limited,  No clubbing, cyanosis    NEUROLOGICAL:   Sedated  Follows commands off sedation     SKIN:   Skin normal color for race,   Warm and dry .   No evidence of rash.    11-18-24 @ 07:01  -  11-19-24 @ 07:00  --------------------------------------------------------  IN:    dextrose 5% + lactated ringers: 1125 mL    Enteral Tube Flush: 50 mL    FentaNYL: 164.4 mL    IV PiggyBack: 50 mL    Propofol: 98.4 mL  Total IN: 1487.8 mL    OUT:    Intermittent Catheterization - Urethral (mL): 500 mL    Rectal Tube (mL): 200 mL    Voided (mL): 900 mL  Total OUT: 1600 mL    Total NET: -112.2 mL      11-19-24 @ 07:01  -  11-19-24 @ 08:35  --------------------------------------------------------  IN:    dextrose 5% + lactated ringers: 75 mL    FentaNYL: 13.7 mL    Propofol: 8.2 mL  Total IN: 96.9 mL    OUT:  Total OUT: 0 mL    Total NET: 96.9 mL          LABS:                            8.3    10.29 )-----------( 250      ( 19 Nov 2024 06:10 )             26.7                                               11-19    147[H]  |  114[H]  |  11  ----------------------------<  82  4.1   |  24  |  0.8    Ca    10.4      19 Nov 2024 06:10  Phos  2.5     11-19  Mg     2.0     11-19    TPro  5.0[L]  /  Alb  2.8[L]  /  TBili  0.5  /  DBili  x   /  AST  17  /  ALT  11  /  AlkPhos  110  11-19      PT/INR - ( 18 Nov 2024 04:39 )   PT: 13.90 sec;   INR: 1.17 ratio         PTT - ( 18 Nov 2024 22:45 )  PTT:33.2 sec                                       Urinalysis Basic - ( 19 Nov 2024 06:10 )    Color: x / Appearance: x / SG: x / pH: x  Gluc: 82 mg/dL / Ketone: x  / Bili: x / Urobili: x   Blood: x / Protein: x / Nitrite: x   Leuk Esterase: x / RBC: x / WBC x   Sq Epi: x / Non Sq Epi: x / Bacteria: x                                                  LIVER FUNCTIONS - ( 19 Nov 2024 06:10 )  Alb: 2.8 g/dL / Pro: 5.0 g/dL / ALK PHOS: 110 U/L / ALT: 11 U/L / AST: 17 U/L / GGT: x                                                                                               Mode: AC/ CMV (Assist Control/ Continuous Mandatory Ventilation)  RR (machine): 14  TV (machine): 360  FiO2: 60  PEEP: 8  ITime: 0.9  MAP: 11  PIP: 29                                      ABG - ( 19 Nov 2024 03:48 )  pH, Arterial: 7.38  pH, Blood: x     /  pCO2: 49    /  pO2: 120   / HCO3: 29    / Base Excess: 3.0   /  SaO2: 97.4                MEDICATIONS  (STANDING):  albuterol    0.083% 2.5 milliGRAM(s) Nebulizer every 6 hours  albuterol    90 MICROgram(s) HFA Inhaler 1 Puff(s) Inhalation every 4 hours  amLODIPine   Tablet 10 milliGRAM(s) Oral daily  chlorhexidine 0.12% Liquid 15 milliLiter(s) Oral Mucosa every 12 hours  chlorhexidine 2% Cloths 1 Application(s) Topical <User Schedule>  dexMEDEtomidine Infusion 0.2 MICROgram(s)/kG/Hr (4.58 mL/Hr) IV Continuous <Continuous>  dextrose 5% + lactated ringers. 1000 milliLiter(s) (75 mL/Hr) IV Continuous <Continuous>  fentaNYL   Infusion 0.5 MICROgram(s)/kG/Hr (4.58 mL/Hr) IV Continuous <Continuous>  losartan 50 milliGRAM(s) Oral daily  nystatin Cream 1 Application(s) Topical two times a day  pantoprazole  Injectable 40 milliGRAM(s) IV Push two times a day  propofol Infusion 5 MICROgram(s)/kG/Min (2.75 mL/Hr) IV Continuous <Continuous>  sodium chloride 0.9% for Nebulization 3 milliLiter(s) Nebulizer daily  sodium chloride 3%  Inhalation 4 milliLiter(s) Inhalation every 12 hours    MEDICATIONS  (PRN):  acetaminophen     Tablet .. 650 milliGRAM(s) Oral every 6 hours PRN Temp greater or equal to 38C (100.4F)  hydrALAZINE Injectable 10 milliGRAM(s) IV Push every 8 hours PRN SBP>180      New X-rays reviewed:                                                                                  ECHO    CXR interpreted by me:  ET OK>  LLL atelectasis

## 2024-11-19 NOTE — PROGRESS NOTE ADULT - PROBLEM SELECTOR PLAN 2
- UCx +ESBL proteus, BCX (-), trach culture with resp rosalva, sputum cx 11/12 resp rosalva  - now intubated and sedated, plan for trach & PEG
- UCx +ESBL proteus, BCX (-), trach culture with resp rosalva, sputum cx 11/12 resp rosalva  - c/w BiPAP/HFNC, chest PT/suctioning/pulmonary toilet

## 2024-11-19 NOTE — PROGRESS NOTE ADULT - PROBLEM SELECTOR PLAN 1
- Intubated 11/6, extubated 11/13 to AVAPS  - now re-intubated and sedated 11/18, plan for trach & PEG 11/19
- Intubated 11/6, extubated 11/13 to AVAPS  - at risk for re-intubation at this time

## 2024-11-19 NOTE — PRE-OP CHECKLIST - AS TEMP SITE
Spoke with the pt and he states his HH nurse came out yesterday and obtained a sterile specimen for C&S. He states he will call back if needed.   rectal

## 2024-11-19 NOTE — PROGRESS NOTE ADULT - ASSESSMENT
ASSESSMENT:  73-year-old female PMH A-fib on xarelto, HTN, s/p cholecystectomy, congenital solitary kidney presented from NH to the ED for evaluation of weakness, decreased PO intake and abdominal distension.   Admitted to ICU for septic shock 2/2 ESBL proteus (UTI vs. GI source) complicated by gastric distension w/ pneumatosis, NOLA 2/2 ATN, hypernatremia. s/p NGT decompression (-3.1 L feculent output) in ED. Intubated 11/6 for AHRF. EGD 11/8 w/ multiple ulcers, largest 10cm), path +gastritis. Now with new R colon/cecal mass pending colonoscopy when more stable. Extubated 11/13 to AVAPs, weaned to HFNC however transitioned back to BiPAP after desaturation. Reintubated 11/18. Failed S&S multiple times. Thoracic surgery consulted for tracheostomy and PEG tube placement.     PLAN:  - patient is added on for tracheostomy and PEG tube placement today  - consent in the chart   - keep NPO, IVF while NPO as tolerated  - replete electrolytes as needed  - rest of the care per primary team     spectra [phone]

## 2024-11-19 NOTE — PROGRESS NOTE ADULT - ASSESSMENT
IMPRESSION:    Acute hypoxemic respiratory failure   L lung atelectasis  Septic shock - resolved  UTI  Suspected colon mass  Severe gastric distension w/ pneumatosis along posterior gastric wall  Possible aspiration / LLL consolidation  Intraperitoneal free air  Thrombocytopenia   hypernatremia  gastric ulcer   HAGMA  NOLA non oliguric resolved  Afib on Xarelto  HO HTN  HO ESBL Proteus    PLAN:    CNS: SAT.  pain control as needed .     HEENT: Oral care. NGT     PULMONARY: Vent changes;  Wean o2 as tolerated.  keep O2 sat 92-96%.  Awaiting tracheostomy.  Bronchoscopy today/        CARDIOVASCULAR: TTE. Mod AS. Gentle LR 50cc/hr while NPO. Goal equal balance. Avoid overload.     GI: GI prophylaxis.  NPO for possible trach today     RENAL:  Follow up lytes. Correct as needed. No ya.     INFECTIOUS DISEASE: SP Meropenem course. No fevers.      HEMATOLOGICAL: Therapeutic Lovenox on Hold for Trach. Monitor CBC     ENDOCRINE:  Follow up FS. Insulin protocol if needed.    MUSCULOSKELETAL: Bed chair position.  Off loading    Full code.     MICU.

## 2024-11-19 NOTE — BRIEF OPERATIVE NOTE - NSICDXBRIEFPROCEDURE_GEN_ALL_CORE_FT
PROCEDURES:  Creation of tracheostomy with insertion of gastrostomy tube and feeding jejunostomy tube 19-Nov-2024 12:37:21  Leo Botello

## 2024-11-19 NOTE — BRIEF OPERATIVE NOTE - NSICDXBRIEFPREOP_GEN_ALL_CORE_FT
PRE-OP DIAGNOSIS:  Adult failure to thrive 19-Nov-2024 12:38:05  Leo Botello  Acute respiratory failure with hypoxia 19-Nov-2024 12:37:53  Leo Botello

## 2024-11-19 NOTE — PROGRESS NOTE ADULT - SUBJECTIVE AND OBJECTIVE BOX
GENERAL SURGERY PROGRESS NOTE    Patient: PATRICIA SPRAGUE , 73y ([birth year])Female   MRN: [id number]  Location: [address]  Visit: 11-03-24 Inpatient  Date: 11-19-24 @ 01:23      Procedure/Dx/Injuries: needs trach and PEG      PAST MEDICAL & SURGICAL HISTORY:  HTN (hypertension)      Diabetes mellitus      Obesity      Gastroesophageal reflux disease      Active asthma      Generalized OA      Afib      H/O hernia repair  2011 and revised in 2013      History of cholecystectomy          Vitals:   T(F): 98 (11-18-24 @ 20:00), Max: 99.9 (11-18-24 @ 16:00)  HR: 72 (11-18-24 @ 23:00)  BP: 117/53 (11-18-24 @ 23:00)  RR: 16 (11-18-24 @ 23:00)  SpO2: 93% (11-18-24 @ 23:00)  Mode: AC/ CMV (Assist Control/ Continuous Mandatory Ventilation), RR (machine): 14, TV (machine): 360, FiO2: 60, PEEP: 8, ITime: 0.9, MAP: 12, PIP: 34    Diet, NPO:   Except Medications      Fluids:     I & O's:    11-17-24 @ 07:01  -  11-18-24 @ 07:00  --------------------------------------------------------  IN:    dextrose 5% + lactated ringers: 675 mL    dextrose 5% + lactated ringers: 825 mL    IV PiggyBack: 400 mL  Total IN: 1900 mL    OUT:    Rectal Tube (mL): 440 mL    Voided (mL): 525 mL  Total OUT: 965 mL    Total NET: 935 mL        Bowel Movement: : [] YES [] NO  Flatus: : [] YES [] NO    PHYSICAL EXAM:  General: NAD, intubated, sedated  HEENT:  Neck supple  Cardiac: S1, S2,   Respiratory: bilateral breath sounds  Abdomen: Soft, non-distended,     MEDICATIONS  (STANDING):  albuterol    0.083% 2.5 milliGRAM(s) Nebulizer every 6 hours  albuterol    90 MICROgram(s) HFA Inhaler 1 Puff(s) Inhalation every 4 hours  amLODIPine   Tablet 10 milliGRAM(s) Oral daily  chlorhexidine 0.12% Liquid 15 milliLiter(s) Oral Mucosa every 12 hours  chlorhexidine 2% Cloths 1 Application(s) Topical <User Schedule>  dexMEDEtomidine Infusion 0.2 MICROgram(s)/kG/Hr (4.58 mL/Hr) IV Continuous <Continuous>  dextrose 5% + lactated ringers. 1000 milliLiter(s) (75 mL/Hr) IV Continuous <Continuous>  fentaNYL   Infusion 0.5 MICROgram(s)/kG/Hr (4.58 mL/Hr) IV Continuous <Continuous>  losartan 50 milliGRAM(s) Oral daily  nystatin Cream 1 Application(s) Topical two times a day  pantoprazole  Injectable 40 milliGRAM(s) IV Push two times a day  propofol Infusion 5 MICROgram(s)/kG/Min (2.75 mL/Hr) IV Continuous <Continuous>  sodium chloride 0.9% for Nebulization 3 milliLiter(s) Nebulizer daily  sodium chloride 3%  Inhalation 4 milliLiter(s) Inhalation every 12 hours    MEDICATIONS  (PRN):  acetaminophen     Tablet .. 650 milliGRAM(s) Oral every 6 hours PRN Temp greater or equal to 38C (100.4F)  hydrALAZINE Injectable 10 milliGRAM(s) IV Push every 8 hours PRN SBP>180      DVT PROPHYLAXIS:   GI PROPHYLAXIS: pantoprazole  Injectable 40 milliGRAM(s) IV Push two times a day    ANTICOAGULATION:   ANTIBIOTICS:            LAB/STUDIES:  Labs:  CAPILLARY BLOOD GLUCOSE      POCT Blood Glucose.: 119 mg/dL (18 Nov 2024 15:07)  POCT Blood Glucose.: 106 mg/dL (18 Nov 2024 04:28)                          8.7    11.35 )-----------( 284      ( 18 Nov 2024 22:45 )             28.2       Auto Neutrophil %: 63.7 % (11-18-24 @ 22:45)  Auto Immature Granulocyte %: 0.6 % (11-18-24 @ 22:45)  Auto Neutrophil %: 70.3 % (11-18-24 @ 04:39)  Auto Immature Granulocyte %: 0.9 % (11-18-24 @ 04:39)    11-18    145  |  113[H]  |  11  ----------------------------<  90  3.9   |  24  |  0.8      Calcium: 10.2 mg/dL (11-18-24 @ 22:45)      LFTs:             5.3  | 0.5  | 16       ------------------[104     ( 18 Nov 2024 22:45 )  2.8  | x    | 11          Lipase:x      Amylase:x         Blood Gas Arterial, Lactate: 0.9 mmol/L (11-18-24 @ 16:43)  Blood Gas Arterial, Lactate: 0.9 mmol/L (11-16-24 @ 12:21)  Blood Gas Arterial, Lactate: 1.0 mmol/L (11-16-24 @ 09:03)  Blood Gas Arterial, Lactate: 1.0 mmol/L (11-16-24 @ 02:59)    ABG - ( 18 Nov 2024 16:43 )  pH: 7.43  /  pCO2: 43    /  pO2: 164   / HCO3: 28    / Base Excess: 3.8   /  SaO2: 97.8            ABG - ( 16 Nov 2024 12:21 )  pH: 7.50  /  pCO2: 38    /  pO2: 219   / HCO3: 30    / Base Excess: 6.0   /  SaO2: 96.6            ABG - ( 16 Nov 2024 09:03 )  pH: 7.48  /  pCO2: 38    /  pO2: 59    / HCO3: 28    / Base Excess: 4.5   /  SaO2: 86.3        Urinalysis Basic - ( 18 Nov 2024 22:45 )    Color: x / Appearance: x / SG: x / pH: x  Gluc: 90 mg/dL / Ketone: x  / Bili: x / Urobili: x   Blood: x / Protein: x / Nitrite: x   Leuk Esterase: x / RBC: x / WBC x   Sq Epi: x / Non Sq Epi: x / Bacteria: x      IMAGING:  < from: Xray Chest 1 View- PORTABLE-Urgent (Xray Chest 1 View- PORTABLE-Urgent .) (11.18.24 @ 15:38) >    IMPRESSION:    Lines and tubes as described. Improved aeration left lung.    < end of copied text >

## 2024-11-19 NOTE — PROGRESS NOTE ADULT - SUBJECTIVE AND OBJECTIVE BOX
Patient is a 73y old Female who presents with a chief complaint of abd pain (11 Nov 2024 08:22)    Overnight events/Interval History:  - No overnight events. Reintubated yesterday ~1500.   - At bedside, patient opens eyes to voice, following commands, tracking    Code Status: full code    ALLERGIES:  No Known Allergies    MEDICATIONS:  STANDING MEDICATIONS  amLODIPine   Tablet 10 milliGRAM(s) Oral daily  chlorhexidine 0.12% Liquid 15 milliLiter(s) Oral Mucosa every 12 hours  chlorhexidine 2% Cloths 1 Application(s) Topical <User Schedule>  fentaNYL   Infusion 0.5 MICROgram(s)/kG/Hr IV Continuous <Continuous>  lactated ringers. 1000 milliLiter(s) IV Continuous <Continuous>  losartan 50 milliGRAM(s) Oral daily  midazolam Injectable 2 milliGRAM(s) IV Push once  nystatin Cream 1 Application(s) Topical two times a day  pantoprazole  Injectable 40 milliGRAM(s) IV Push two times a day  propofol Infusion 5 MICROgram(s)/kG/Min IV Continuous <Continuous>    PRN MEDICATIONS  acetaminophen     Tablet .. 650 milliGRAM(s) Oral every 6 hours PRN  albuterol    90 MICROgram(s) HFA Inhaler 2 Puff(s) Inhalation every 6 hours PRN  hydrALAZINE Injectable 10 milliGRAM(s) IV Push every 8 hours PRN    ICU Vital Signs Last 24 Hrs  T(C): 36.8 (19 Nov 2024 10:03), Max: 37.7 (18 Nov 2024 16:00)  T(F): 98.2 (19 Nov 2024 10:03), Max: 99.9 (18 Nov 2024 16:00)  HR: 82 (19 Nov 2024 10:03) (52 - 98)  BP: 141/60 (19 Nov 2024 10:03) (88/53 - 180/74)  BP(mean): 86 (19 Nov 2024 10:00) (63 - 107)  ABP: --  ABP(mean): --  RR: 14 (19 Nov 2024 10:03) (14 - 50)  SpO2: 97% (19 Nov 2024 10:03) (91% - 100%)    O2 Parameters below as of 19 Nov 2024 08:00  Patient On (Oxygen Delivery Method): ventilator      PHYSICAL EXAM:  GENERAL: NAD, sedated  HEENT:  moist mucous membranes  CHEST/LUNG: decreased BS on L  HEART: Regular rate and rhythm  ABDOMEN: Soft, nontender, mildly distended   EXTREMITIES:  No lower extremity edema bilaterally  NERVOUS SYSTEM: opens eyes to voice, tracking, following commands       LABS:                        8.3    10.29 )-----------( 250      ( 19 Nov 2024 06:10 )             26.7     11-19    147[H]  |  114[H]  |  11  ----------------------------<  82  4.1   |  24  |  0.8    Ca    10.4      19 Nov 2024 06:10  Phos  2.5     11-19  Mg     2.0     11-19    TPro  5.0[L]  /  Alb  2.8[L]  /  TBili  0.5  /  DBili  x   /  AST  17  /  ALT  11  /  AlkPhos  110  11-19    PT/INR - ( 18 Nov 2024 04:39 )   PT: 13.90 sec;   INR: 1.17 ratio         PTT - ( 18 Nov 2024 22:45 )  PTT:33.2 sec  Urinalysis Basic - ( 19 Nov 2024 06:10 )    Color: x / Appearance: x / SG: x / pH: x  Gluc: 82 mg/dL / Ketone: x  / Bili: x / Urobili: x   Blood: x / Protein: x / Nitrite: x   Leuk Esterase: x / RBC: x / WBC x   Sq Epi: x / Non Sq Epi: x / Bacteria: x      ABG - ( 19 Nov 2024 03:48 )  pH, Arterial: 7.38  pH, Blood: x     /  pCO2: 49    /  pO2: 120   / HCO3: 29    / Base Excess: 3.0   /  SaO2: 97.4                      RADIOLOGY:  CXR  Xray Chest 1 View- PORTABLE-Urgent:   ACC: [id number] EXAM:  XR CHEST PORTABLE URGENT 1V   ORDERED BY: HAYDEN UJAREZ     PROCEDURE DATE:  11/18/2024          INTERPRETATION:  CLINICAL HISTORY: ett adjustment.    COMPARISON: Earlier the same day.    TECHNIQUE: Portable frontal chest radiograph. Adequate positioning.    FINDINGS:    Support devices: Endotracheal tube in satisfactory position. Sidehole   nasogastric tube in the region of the stomach.    Cardiac/mediastinum/hilum: Stable.    Lung parenchyma/Pleura: Left lung opacities/effusion, unchanged.    Skeleton/soft tissues: Stable.      IMPRESSION:    Left lung opacity/effusion, unchanged.    --- End of Report ---            TANJA HU MD; Attending Radiologist  This document has been electronically signed. Nov 19 2024 9:47AM (11-18-24 @ 17:00)  Xray Chest 1 View- PORTABLE-Urgent:   ACC: [id number] EXAM:  XR CHEST PORTABLE URGENT 1V   ORDERED BY: HAYDEN JUAREZ     PROCEDURE DATE:  11/18/2024          INTERPRETATION:  CLINICAL HISTORY: post intubation/og tube.    COMPARISON: Earlier the same day.    TECHNIQUE: Portable frontal chest radiograph. Adequate positioning.    FINDINGS:    Support devices: Endotracheal tube in satisfactory position. Nasogastric   tube with sidehole in the region of the stomach.    Cardiac/mediastinum/hilum: Stable.    Lung parenchyma/Pleura: Continued improved aeration of the left lung.   Persistent opacities right lung. Probable small right pleural effusion    Skeleton/soft tissues: Stable.      IMPRESSION:    Lines and tubes as described. Improved aeration left lung.    --- End of Report ---            TANJA HU MD; Attending Radiologist  This document has been electronically signed. Nov 18 2024  4:14PM (11-18-24 @ 15:38)  Xray Chest 1 View- PORTABLE-Urgent:   ACC: [id number] EXAM:  XR CHEST PORTABLE URGENT 1V   ORDERED BY: HAYDEN JUAREZ     PROCEDURE DATE:  11/18/2024          INTERPRETATION:  CLINICAL HISTORY: atelectasis.    COMPARISON: Same day chest radiograph, 5:09 AM.    TECHNIQUE: Portable frontal chest radiograph. Adequate positioning.    FINDINGS:    Support devices: Telemetry leads overlie the chest.    Cardiac/mediastinum/hilum: Obscured.    Lung parenchyma/Pleura: Improvement in opacification of the left   hemithorax. Redemonstration of right-sided infiltrates.    Skeleton/soft tissues: Stable.      IMPRESSION:    Improvement in opacification of the left hemothorax Pleural effusion   and/or opacifications may be present. Redemonstrated right-sided   infiltrates.    --- End of Report ---          SURINDER VASQUEZ MD; Resident Radiologist  This document has been electronically signed.  TANJA HU MD; Attending Radiologist  This document has been electronically signed. Nov 18 2024  2:14PM (11-18-24 @ 13:56)  Xray Chest 1 View- PORTABLE-Urgent:   ACC: [id number] EXAM:  XR CHEST PORTABLE URGENT 1V   ORDERED BY: CEDRICK CHEEK     PROCEDURE DATE:  11/16/2024          INTERPRETATION:  CLINICAL HISTORY: sob.    COMPARISON: 11/16/2024.    TECHNIQUE: Frontal Adequate positioning.    FINDINGS:    Support devices: Central venous line superior vena cava    Cardiac/mediastinum/hilum: Stable.    Lung parenchyma/Pleura: Left lower lobe opacity/pleural effusion   unchanged. No air leak.    Skeleton/soft tissues: Stable.      IMPRESSION:    Left lower lobeopacity/pleural effusion unchanged. No air leak.    --- End of Report ---            RAYSA SEGURA MD; Attending Radiologist  This document has been electronically signed. Nov 16 2024  2:49PM (11-16-24 @ 11:39)      CT

## 2024-11-19 NOTE — BRIEF OPERATIVE NOTE - NSICDXBRIEFPOSTOP_GEN_ALL_CORE_FT
POST-OP DIAGNOSIS:  Acute respiratory failure with hypoxia 19-Nov-2024 12:38:39  Leo Botello  Adult failure to thrive 19-Nov-2024 12:38:12  Leo Botello

## 2024-11-19 NOTE — PROGRESS NOTE ADULT - SUBJECTIVE AND OBJECTIVE BOX
HPI:  73-year-old female PMH A-fib on xarelto,, HTN, s/p cholecystectomy, congenital solitary kidney presents from NH to the ED for evaluation of weakness, decreased PO intake and abdominal distension. Pt's son Everton says pt has not been feeling well the past few days, and has had poor appetite which is unlike her. She's been more tired and weak, unable to get out of bed. Has not had a bowel movement in 3 days, pt unsure if she's been passing gas otherwise. She was noted to be hypotensive and have an elevated wbc count at the NH as well. No dysuria, chest pain, SOB, cough or fever per pt otherwise.  No other complaints currently.     In the ED, BP 66/45 S/P LR 2800cc bolus with no improvement in BP, started on peripheral levo. Labs with wbc 19.29, vbg lactate 2.0-->2.3, pH 7.2, pCO2 45, Na 133, AG 21, Cr 4.3 (baseline 1.1), UA contaminated. CTAP with severe gastric distension, multiple foci of intraperitoneal free air and urinary bladder thickening with free air. Surgery consulted, no intervention for now, NGT placed for gastric decompression with 3.1L feculent output, recommend GI consult.      (03 Nov 2024 21:25)    Patient somnolent but arousable. No family at bedside.     ITEMS NOT CHECKED ARE NOT PRESENT    SOCIAL HISTORY:   Significant other/partner[ ]  Children[x ]  Buddhism/Spirituality:  Substance hx:  [ ]   Tobacco hx:  [ ]   Alcohol hx: [ ]   Living Situation: [ ]Home  [ ]Long term care  [ ]Rehab [ ]Other  Home Services: [ ] HHA [ ] Visting RN [ ] Hospice  Occupation:  Home Opioid hx:  [ ] Y [ ] N [ ] I-Stop Reference No:     ADVANCE DIRECTIVES:    [x ] Full Code [ ] DNR  MOLST  [ ]  Living Will  [ ]   DECISION MAKER(s):  [ ] Health Care Proxy(s)  [ ] Surrogate(s)  [ ] Guardian           Name(s): Phone Number(s):    BASELINE (I)ADL(s) (prior to admission):  [place]: [ ]Total  [ ] Moderate [ ]Dependent  Palliative Performance Status Version 2:         %    http://npcrc.org/files/news/palliative_performance_scale_ppsv2.pdf    Allergies    No Known Allergies    Intolerances    MEDICATIONS  (STANDING):  amLODIPine   Tablet 10 milliGRAM(s) Oral daily  chlorhexidine 0.12% Liquid 15 milliLiter(s) Oral Mucosa every 12 hours  chlorhexidine 2% Cloths 1 Application(s) Topical <User Schedule>  fentaNYL   Infusion 0.5 MICROgram(s)/kG/Hr (4.58 mL/Hr) IV Continuous <Continuous>  lactated ringers. 1000 milliLiter(s) (50 mL/Hr) IV Continuous <Continuous>  losartan 50 milliGRAM(s) Oral daily  nystatin Cream 1 Application(s) Topical two times a day  pantoprazole  Injectable 40 milliGRAM(s) IV Push two times a day  propofol Infusion 5 MICROgram(s)/kG/Min (2.75 mL/Hr) IV Continuous <Continuous>    MEDICATIONS  (PRN):  acetaminophen     Tablet .. 650 milliGRAM(s) Oral every 6 hours PRN Temp greater or equal to 38C (100.4F)  albuterol    90 MICROgram(s) HFA Inhaler 2 Puff(s) Inhalation every 6 hours PRN Shortness of Breath and/or Wheezing  hydrALAZINE Injectable 10 milliGRAM(s) IV Push every 8 hours PRN SBP>180      PRESENT SYMPTOMS: [x ]Unable to obtain due to poor mentation   Source if other than patient:  [ ]Family   [ ]Team     Pain: [ ]yes [ ]no  QOL impact -   Location -                    Aggravating factors -  Alleviating factors -   Quality -  Radiation -  Timing -   Severity (0-10 scale):  Minimal acceptable level (0-10 scale):     CPOT:  0  https://www.[facility].org/getattachment/omg38x64-3z5e-5b1k-[email or web address]-4125z1998v3o/Critical-Care-Pain-Observation-Tool-(CPOT)    PAIN AD Score:   http://geriatrictoolkit.[place].[facility]/cog/painad.pdf     Dyspnea:                           [ ]None[ ]Mild [ ]Moderate [ ]Severe     Respiratory Distress Observation Scale (RDOS): 0  A score of 0 to 2 signifies little or no respiratory distress, 3 signifies mild distress, scores 4 to 6 indicate moderate distress, and scores greater than 7 signify severe distress  https://www.[facility].ca/sites/default/files/PDFS/638857-jzjsaxeazbw-ogkjafer-ggcdnufbmxi-puhil.pdf    Anxiety:                             [ ]None[ ]Mild [ ]Moderate [ ]Severe   Fatigue:                             [ ]None[ ]Mild [ ]Moderate [ ]Severe   Nausea:                             [ ]None[ ]Mild [ ]Moderate [ ]Severe   Loss of appetite:              [ ]None[ ]Mild [ ]Moderate [ ]Severe   Constipation:                    [ ]None[ ]Mild [ ]Moderate [ ]Severe    Other Symptoms:  [x ]All other review of systems negative     Palliative Performance Status Version 2:         %    http://[facility].org/files/news/palliative_performance_scale_ppsv2.pdf  PHYSICAL EXAM:    ICU Vital Signs Last 24 Hrs  T(C): 36.8 (19 Nov 2024 10:58), Max: 37.7 (18 Nov 2024 16:00)  T(F): 98.2 (19 Nov 2024 10:03), Max: 99.9 (18 Nov 2024 16:00)  HR: 84 (19 Nov 2024 11:00) (52 - 98)  BP: 132/60 (19 Nov 2024 11:00) (88/53 - 161/65)  BP(mean): 86 (19 Nov 2024 11:00) (63 - 104)  ABP: --  ABP(mean): --  RR: 14 (19 Nov 2024 11:00) (14 - 50)  SpO2: 100% (19 Nov 2024 11:00) (91% - 100%)    O2 Parameters below as of 19 Nov 2024 10:58    O2 Flow (L/min): 60  O2 Concentration (%): 60        GENERAL:  [x ] No acute distress [ ]Lethargic  [ ]Unarousable  [ ]Verbal  [x ]Non-Verbal [ ]Cachexia    BEHAVIORAL/PSYCH:  [ ]Alert and Oriented x  [ ] Anxiety [ ] Delirium [ ] Agitation [x ] Calm   EYES: [x ] No scleral icterus [ ] Scleral icterus [ ] Closed  ENMT:  [ ]Dry mouth  [ ]No external oral lesions [x ] No external ear or nose lesions  CARDIOVASCULAR:  [ ]Regular [ ]Irregular [ ]Tachy [x ]Not Tachy  [ ]Jay [ ] Edema [ ] No edema  PULMONARY:  [ ]Tachypnea  [ ]Audible excessive secretions [x ] No labored breathing [ ] mildly labored breathing [ ] labored breathing  GASTROINTESTINAL: [ ]Soft  [ ]Distended  [x ]Not distended [ ]Non tender [ ]Tender  MUSCULOSKELETAL: [ ]No clubbing [ ] clubbing  [x ] No cyanosis [ ] cyanosis  NEUROLOGIC: [ ]No focal deficits  [ ]Follows commands  [ ]Does not follow commands  [x ]Cognitive impairment  [ ]Dysphagia  [ ]Dysarthria  [ ]Paresis   SKIN: [ ] Jaundiced [x ] Non-jaundiced [ ]Rash [ ]No Rash [ ] Warm [ ] Dry  MISC/LINES: [x ] ET tube [ ] Trach [ ]NGT/OGT [ ]PEG [ ]Burger    CRITICAL CARE:  [ ] Shock Present  [ ]Septic [ ]Cardiogenic [ ]Neurologic [ ]Hypovolemic  [ ]  Vasopressors [ ]  Inotropes   [x ]Respiratory failure present [x ]Mechanical ventilation [ ]Non-invasive ventilatory support [x ]High flow  [ ]Acute  [ ]Chronic [ ]Hypoxic  [ ]Hypercarbic [ ]Other  [ ]Other organ failure   LABS: I have reviewed daily labs                          8.3    10.29 )-----------( 250      ( 19 Nov 2024 06:10 )             26.7     11-19    147[H]  |  114[H]  |  11  ----------------------------<  82  4.1   |  24  |  0.8    Ca    10.4      19 Nov 2024 06:10  Phos  2.5     11-19  Mg     2.0     11-19    TPro  5.0[L]  /  Alb  2.8[L]  /  TBili  0.5  /  DBili  x   /  AST  17  /  ALT  11  /  AlkPhos  110  11-19    PT/INR - ( 18 Nov 2024 04:39 )   PT: 13.90 sec;   INR: 1.17 ratio         PTT - ( 18 Nov 2024 22:45 )  PTT:33.2 sec  Urinalysis Basic - ( 19 Nov 2024 06:10 )    Color: x / Appearance: x / SG: x / pH: x  Gluc: 82 mg/dL / Ketone: x  / Bili: x / Urobili: x   Blood: x / Protein: x / Nitrite: x   Leuk Esterase: x / RBC: x / WBC x   Sq Epi: x / Non Sq Epi: x / Bacteria: x            RADIOLOGY & ADDITIONAL STUDIES: I have reviewed new imaging  < from: Xray Chest 1 View- PORTABLE-Routine (Xray Chest 1 View- PORTABLE-Routine in AM.) (11.18.24 @ 07:07) >  IMPRESSION:    Complete opacification left hemithorax with ipsilateral shift of the   mediastinum. May reflect mucous plugging with resulting atelectasis.   Pleural effusion and/or opacifications may be present.    Findings were discussed with Dr. Shankar during the time interpretation   on November 18, 2024 at 10:15 AM with read back    --- End of Report ---    < end of copied text >    PROTEIN CALORIE MALNUTRITION PRESENT: [ ]mild [ ]moderate [ ]severe [ ]underweight [ ]morbid obesity  https://www.andeal.org/vault/2440/web/files/ONC/Table_Clinical%20Characteristics%20to%20Document%20Malnutrition-White%20JV%20et%20al%202012.pdf    Height (cm): 167.6 (11-05-24 @ 01:00)  Weight (kg): 91.6 (11-05-24 @ 01:00)  BMI (kg/m2): 32.6 (11-05-24 @ 01:00)    [ ]PPSV2 < or = to 30% [ ]significant weight loss  [ ]poor nutritional intake  [ ]anasarca      [ ]Artificial Nutrition      Palliative Care Spiritual/Emotional Screening Tool Question  Severity (0-4):                    OR                    [ x] Unable to determine. Will assess at later time if appropriate.  Score of 2 or greater indicates recommendation of Chaplaincy and/or SW referral  Chaplaincy Referral: [ ] Yes [ ] Refused [ ] Following     Caregiver [place]:  [ ] Yes [ ] No    OR    [x ] Unable to determine. Will assess at later time if appropriate.  Social Work Referral [ ]  Patient and Family Centered Care Referral [ ]    Anticipatory Grief Present: [ ] Yes [ ] No    OR     [ x] Unable to determine. Will assess at later time if appropriate.  Social Work Referral [ ]  Patient and Family Centered Care Referral [ ]    REFERRALS:   [ ]Chaplaincy  [ ]Hospice  [ ]Child Life  [ ]Social Work  [ ]Case management [ ]Holistic Therapy     Palliative care education provided to patient and/or family

## 2024-11-20 LAB
ALBUMIN SERPL ELPH-MCNC: 2.7 G/DL — LOW (ref 3.5–5.2)
ALP SERPL-CCNC: 124 U/L — HIGH (ref 30–115)
ALT FLD-CCNC: 11 U/L — SIGNIFICANT CHANGE UP (ref 0–41)
ANION GAP SERPL CALC-SCNC: 8 MMOL/L — SIGNIFICANT CHANGE UP (ref 7–14)
APTT BLD: 33.9 SEC — SIGNIFICANT CHANGE UP (ref 27–39.2)
AST SERPL-CCNC: 14 U/L — SIGNIFICANT CHANGE UP (ref 0–41)
BASOPHILS # BLD AUTO: 0.06 K/UL — SIGNIFICANT CHANGE UP (ref 0–0.2)
BASOPHILS NFR BLD AUTO: 0.7 % — SIGNIFICANT CHANGE UP (ref 0–1)
BILIRUB SERPL-MCNC: 0.7 MG/DL — SIGNIFICANT CHANGE UP (ref 0.2–1.2)
BUN SERPL-MCNC: 11 MG/DL — SIGNIFICANT CHANGE UP (ref 10–20)
CALCIUM SERPL-MCNC: 9.9 MG/DL — SIGNIFICANT CHANGE UP (ref 8.4–10.4)
CHLORIDE SERPL-SCNC: 113 MMOL/L — HIGH (ref 98–110)
CHOLEST SERPL-MCNC: 133 MG/DL — SIGNIFICANT CHANGE UP
CO2 SERPL-SCNC: 26 MMOL/L — SIGNIFICANT CHANGE UP (ref 17–32)
CREAT SERPL-MCNC: 0.8 MG/DL — SIGNIFICANT CHANGE UP (ref 0.7–1.5)
EGFR: 79 ML/MIN/1.73M2 — SIGNIFICANT CHANGE UP
EOSINOPHIL # BLD AUTO: 0.4 K/UL — SIGNIFICANT CHANGE UP (ref 0–0.7)
EOSINOPHIL NFR BLD AUTO: 4.5 % — SIGNIFICANT CHANGE UP (ref 0–8)
GAS PNL BLDA: SIGNIFICANT CHANGE UP
GLUCOSE BLDC GLUCOMTR-MCNC: 104 MG/DL — HIGH (ref 70–99)
GLUCOSE BLDC GLUCOMTR-MCNC: 87 MG/DL — SIGNIFICANT CHANGE UP (ref 70–99)
GLUCOSE SERPL-MCNC: 81 MG/DL — SIGNIFICANT CHANGE UP (ref 70–99)
GRAM STN FLD: ABNORMAL
HCT VFR BLD CALC: 26.8 % — LOW (ref 37–47)
HDLC SERPL-MCNC: 34 MG/DL — LOW
HGB BLD-MCNC: 8.3 G/DL — LOW (ref 12–16)
IMM GRANULOCYTES NFR BLD AUTO: 0.6 % — HIGH (ref 0.1–0.3)
INR BLD: 1.08 RATIO — SIGNIFICANT CHANGE UP (ref 0.65–1.3)
LIPID PNL WITH DIRECT LDL SERPL: 63 MG/DL — SIGNIFICANT CHANGE UP
LYMPHOCYTES # BLD AUTO: 1.21 K/UL — SIGNIFICANT CHANGE UP (ref 1.2–3.4)
LYMPHOCYTES # BLD AUTO: 13.6 % — LOW (ref 20.5–51.1)
MAGNESIUM SERPL-MCNC: 1.6 MG/DL — LOW (ref 1.8–2.4)
MCHC RBC-ENTMCNC: 29.7 PG — SIGNIFICANT CHANGE UP (ref 27–31)
MCHC RBC-ENTMCNC: 31 G/DL — LOW (ref 32–37)
MCV RBC AUTO: 96.1 FL — SIGNIFICANT CHANGE UP (ref 81–99)
MONOCYTES # BLD AUTO: 0.8 K/UL — HIGH (ref 0.1–0.6)
MONOCYTES NFR BLD AUTO: 9 % — SIGNIFICANT CHANGE UP (ref 1.7–9.3)
NEUTROPHILS # BLD AUTO: 6.38 K/UL — SIGNIFICANT CHANGE UP (ref 1.4–6.5)
NEUTROPHILS NFR BLD AUTO: 71.6 % — SIGNIFICANT CHANGE UP (ref 42.2–75.2)
NIGHT BLUE STAIN TISS: SIGNIFICANT CHANGE UP
NON HDL CHOLESTEROL: 99 MG/DL — SIGNIFICANT CHANGE UP
NRBC # BLD: 0 /100 WBCS — SIGNIFICANT CHANGE UP (ref 0–0)
PHOSPHATE SERPL-MCNC: 2.7 MG/DL — SIGNIFICANT CHANGE UP (ref 2.1–4.9)
PLATELET # BLD AUTO: 263 K/UL — SIGNIFICANT CHANGE UP (ref 130–400)
PMV BLD: 11.6 FL — HIGH (ref 7.4–10.4)
POTASSIUM SERPL-MCNC: 3.2 MMOL/L — LOW (ref 3.5–5)
POTASSIUM SERPL-SCNC: 3.2 MMOL/L — LOW (ref 3.5–5)
PROT SERPL-MCNC: 5 G/DL — LOW (ref 6–8)
PROTHROM AB SERPL-ACNC: 12.8 SEC — SIGNIFICANT CHANGE UP (ref 9.95–12.87)
RBC # BLD: 2.79 M/UL — LOW (ref 4.2–5.4)
RBC # FLD: 13.2 % — SIGNIFICANT CHANGE UP (ref 11.5–14.5)
SODIUM SERPL-SCNC: 147 MMOL/L — HIGH (ref 135–146)
SPECIMEN SOURCE: SIGNIFICANT CHANGE UP
SPECIMEN SOURCE: SIGNIFICANT CHANGE UP
TRIGL SERPL-MCNC: 178 MG/DL — HIGH
WBC # BLD: 8.9 K/UL — SIGNIFICANT CHANGE UP (ref 4.8–10.8)
WBC # FLD AUTO: 8.9 K/UL — SIGNIFICANT CHANGE UP (ref 4.8–10.8)

## 2024-11-20 PROCEDURE — 71045 X-RAY EXAM CHEST 1 VIEW: CPT | Mod: 26

## 2024-11-20 PROCEDURE — 99291 CRITICAL CARE FIRST HOUR: CPT | Mod: 25

## 2024-11-20 PROCEDURE — 71045 X-RAY EXAM CHEST 1 VIEW: CPT | Mod: 26,77

## 2024-11-20 PROCEDURE — 31624 DX BRONCHOSCOPE/LAVAGE: CPT

## 2024-11-20 RX ORDER — POTASSIUM CHLORIDE 600 MG/1
40 TABLET, EXTENDED RELEASE ORAL ONCE
Refills: 0 | Status: DISCONTINUED | OUTPATIENT
Start: 2024-11-20 | End: 2024-11-20

## 2024-11-20 RX ORDER — HYDROMORPHONE HYDROCHLORIDE 2 MG/1
0.5 TABLET ORAL EVERY 4 HOURS
Refills: 0 | Status: DISCONTINUED | OUTPATIENT
Start: 2024-11-20 | End: 2024-11-20

## 2024-11-20 RX ORDER — PANTOPRAZOLE SODIUM 40 MG/1
40 TABLET, DELAYED RELEASE ORAL
Refills: 0 | Status: DISCONTINUED | OUTPATIENT
Start: 2024-11-20 | End: 2024-12-09

## 2024-11-20 RX ORDER — POTASSIUM CHLORIDE 600 MG/1
20 TABLET, EXTENDED RELEASE ORAL
Refills: 0 | Status: DISCONTINUED | OUTPATIENT
Start: 2024-11-20 | End: 2024-11-20

## 2024-11-20 RX ORDER — ENOXAPARIN SODIUM 30 MG/.3ML
90 INJECTION SUBCUTANEOUS EVERY 12 HOURS
Refills: 0 | Status: DISCONTINUED | OUTPATIENT
Start: 2024-11-21 | End: 2024-11-30

## 2024-11-20 RX ORDER — HYDROMORPHONE HYDROCHLORIDE 2 MG/1
0.25 TABLET ORAL EVERY 4 HOURS
Refills: 0 | Status: DISCONTINUED | OUTPATIENT
Start: 2024-11-20 | End: 2024-11-20

## 2024-11-20 RX ORDER — VITAMIN A
1 CREAM (GRAM) TOPICAL
Refills: 0 | Status: DISCONTINUED | OUTPATIENT
Start: 2024-11-20 | End: 2024-12-09

## 2024-11-20 RX ORDER — ENOXAPARIN SODIUM 30 MG/.3ML
90 INJECTION SUBCUTANEOUS EVERY 12 HOURS
Refills: 0 | Status: DISCONTINUED | OUTPATIENT
Start: 2024-11-20 | End: 2024-11-20

## 2024-11-20 RX ORDER — POTASSIUM CHLORIDE 600 MG/1
20 TABLET, EXTENDED RELEASE ORAL
Refills: 0 | Status: COMPLETED | OUTPATIENT
Start: 2024-11-20 | End: 2024-11-20

## 2024-11-20 RX ORDER — POTASSIUM CHLORIDE 600 MG/1
40 TABLET, EXTENDED RELEASE ORAL ONCE
Refills: 0 | Status: COMPLETED | OUTPATIENT
Start: 2024-11-20 | End: 2024-11-20

## 2024-11-20 RX ORDER — FENTANYL 12 UG/H
100 PATCH, EXTENDED RELEASE TRANSDERMAL ONCE
Refills: 0 | Status: DISCONTINUED | OUTPATIENT
Start: 2024-11-20 | End: 2024-11-20

## 2024-11-20 RX ADMIN — FENTANYL 100 MICROGRAM(S): 12 PATCH, EXTENDED RELEASE TRANSDERMAL at 12:17

## 2024-11-20 RX ADMIN — CHLORHEXIDINE GLUCONATE 15 MILLILITER(S): 1.2 RINSE ORAL at 17:17

## 2024-11-20 RX ADMIN — POTASSIUM CHLORIDE 40 MILLIEQUIVALENT(S): 600 TABLET, EXTENDED RELEASE ORAL at 11:31

## 2024-11-20 RX ADMIN — Medication 25 GRAM(S): at 10:53

## 2024-11-20 RX ADMIN — AMLODIPINE BESYLATE 10 MILLIGRAM(S): 10 TABLET ORAL at 05:10

## 2024-11-20 RX ADMIN — POTASSIUM CHLORIDE 50 MILLIEQUIVALENT(S): 600 TABLET, EXTENDED RELEASE ORAL at 10:53

## 2024-11-20 RX ADMIN — Medication 25 GRAM(S): at 08:23

## 2024-11-20 RX ADMIN — FENTANYL 100 MICROGRAM(S): 12 PATCH, EXTENDED RELEASE TRANSDERMAL at 12:02

## 2024-11-20 RX ADMIN — PANTOPRAZOLE SODIUM 40 MILLIGRAM(S): 40 TABLET, DELAYED RELEASE ORAL at 17:17

## 2024-11-20 RX ADMIN — Medication 50 MILLILITER(S): at 05:09

## 2024-11-20 RX ADMIN — LOSARTAN POTASSIUM 50 MILLIGRAM(S): 100 TABLET, FILM COATED ORAL at 05:10

## 2024-11-20 RX ADMIN — POTASSIUM CHLORIDE 50 MILLIEQUIVALENT(S): 600 TABLET, EXTENDED RELEASE ORAL at 08:23

## 2024-11-20 RX ADMIN — NYSTATIN 1 APPLICATION(S): 100000 POWDER TOPICAL at 17:17

## 2024-11-20 RX ADMIN — PANTOPRAZOLE SODIUM 40 MILLIGRAM(S): 40 TABLET, DELAYED RELEASE ORAL at 05:10

## 2024-11-20 RX ADMIN — CHLORHEXIDINE GLUCONATE 15 MILLILITER(S): 1.2 RINSE ORAL at 05:10

## 2024-11-20 RX ADMIN — NYSTATIN 1 APPLICATION(S): 100000 POWDER TOPICAL at 05:11

## 2024-11-20 RX ADMIN — Medication 1 APPLICATION(S): at 17:17

## 2024-11-20 RX ADMIN — CHLORHEXIDINE GLUCONATE 1 APPLICATION(S): 1.2 RINSE ORAL at 05:10

## 2024-11-20 NOTE — PROGRESS NOTE ADULT - SUBJECTIVE AND OBJECTIVE BOX
Patient is a 73y old  Female who presents with a chief complaint of abd pain (15 Nov 2024 06:45)        Over Night Events:  Events noted.  SP trach and bronchoscopy yesterday.  Off pressors.  Sedated/.          ROS:     All ROS are negative except HPI         PHYSICAL EXAM    ICU Vital Signs Last 24 Hrs  T(C): 36.8 (20 Nov 2024 04:00), Max: 36.8 (19 Nov 2024 10:03)  T(F): 98.2 (20 Nov 2024 04:00), Max: 98.3 (19 Nov 2024 16:00)  HR: 81 (20 Nov 2024 07:35) (66 - 85)  BP: 156/65 (20 Nov 2024 07:00) (117/58 - 158/67)  BP(mean): 94 (20 Nov 2024 07:00) (79 - 97)  ABP: --  ABP(mean): --  RR: 19 (20 Nov 2024 07:00) (12 - 26)  SpO2: 97% (20 Nov 2024 07:35) (95% - 100%)    O2 Parameters below as of 20 Nov 2024 04:00  Patient On (Oxygen Delivery Method): ventilator    O2 Concentration (%): 50        CONSTITUTIONAL:  NAD    ENT:   Airway patent,   Mouth with normal mucosa.   Trach     EYES:   Pupils equal,   Round and reactive to light.    CARDIAC:   Normal rate,   Regular rhythm.    No edema    RESPIRATORY:   No wheezing  Bilateral BS  Normal chest expansion  Not tachypneic,  No use of accessory muscles    GASTROINTESTINAL:  Abdomen soft,   Non-tender,   No guarding,   + BS    MUSCULOSKELETAL:   Range of motion is not limited,  No clubbing, cyanosis    NEUROLOGICAL:   Sedated  Follows commands     SKIN:   Skin normal color for race,   Warm and dry   No evidence of rash.      11-19-24 @ 07:01  -  11-20-24 @ 07:00  --------------------------------------------------------  IN:    dextrose 5% + lactated ringers: 75 mL    Enteral Tube Flush: 50 mL    FentaNYL: 243.3 mL    Lactated Ringers: 1150 mL    Propofol: 120.6 mL  Total IN: 1638.9 mL    OUT:    Rectal Tube (mL): 325 mL    Voided (mL): 600 mL  Total OUT: 925 mL    Total NET: 713.9 mL          LABS:                            8.3    8.90  )-----------( 263      ( 20 Nov 2024 04:32 )             26.8                                               11-20    147[H]  |  113[H]  |  11  ----------------------------<  81  3.2[L]   |  26  |  0.8    Ca    9.9      20 Nov 2024 04:32  Phos  2.7     11-20  Mg     1.6     11-20    TPro  5.0[L]  /  Alb  2.7[L]  /  TBili  0.7  /  DBili  x   /  AST  14  /  ALT  11  /  AlkPhos  124[H]  11-20      PT/INR - ( 20 Nov 2024 04:32 )   PT: 12.80 sec;   INR: 1.08 ratio         PTT - ( 20 Nov 2024 04:32 )  PTT:33.9 sec                                       Urinalysis Basic - ( 20 Nov 2024 04:32 )    Color: x / Appearance: x / SG: x / pH: x  Gluc: 81 mg/dL / Ketone: x  / Bili: x / Urobili: x   Blood: x / Protein: x / Nitrite: x   Leuk Esterase: x / RBC: x / WBC x   Sq Epi: x / Non Sq Epi: x / Bacteria: x                                                  LIVER FUNCTIONS - ( 20 Nov 2024 04:32 )  Alb: 2.7 g/dL / Pro: 5.0 g/dL / ALK PHOS: 124 U/L / ALT: 11 U/L / AST: 14 U/L / GGT: x                                                  Culture - Fungal, Bronchial (collected 19 Nov 2024 11:25)  Source: Bronchial  Preliminary Report (20 Nov 2024 06:56):    Testing in progress    Culture - Bronchial (collected 19 Nov 2024 11:25)  Source: Bronch Wash  Gram Stain (19 Nov 2024 23:26):    Rare polymorphonuclear leukocytes per low power field    No Squamous epithelial cells per low power field    No organisms seen per oil power field                                                   Mode: AC/ CMV (Assist Control/ Continuous Mandatory Ventilation)  RR (machine): 14  TV (machine): 360  FiO2: 40  PEEP: 10  ITime: 0.8  MAP: 11  PIP: 23                                      ABG - ( 20 Nov 2024 03:33 )  pH, Arterial: 7.37  pH, Blood: x     /  pCO2: 47    /  pO2: 132   / HCO3: 27    / Base Excess: 1.6   /  SaO2: 97.5                MEDICATIONS  (STANDING):  amLODIPine   Tablet 10 milliGRAM(s) Oral daily  chlorhexidine 0.12% Liquid 15 milliLiter(s) Oral Mucosa every 12 hours  chlorhexidine 2% Cloths 1 Application(s) Topical <User Schedule>  fentaNYL   Infusion 0.5 MICROgram(s)/kG/Hr (4.58 mL/Hr) IV Continuous <Continuous>  lactated ringers. 1000 milliLiter(s) (50 mL/Hr) IV Continuous <Continuous>  losartan 50 milliGRAM(s) Oral daily  nystatin Cream 1 Application(s) Topical two times a day  pantoprazole  Injectable 40 milliGRAM(s) IV Push two times a day  potassium chloride  20 mEq/100 mL IVPB 20 milliEquivalent(s) IV Intermittent every 2 hours  propofol Infusion 5 MICROgram(s)/kG/Min (2.75 mL/Hr) IV Continuous <Continuous>    MEDICATIONS  (PRN):  acetaminophen     Tablet .. 650 milliGRAM(s) Oral every 6 hours PRN Temp greater or equal to 38C (100.4F)  albuterol    90 MICROgram(s) HFA Inhaler 2 Puff(s) Inhalation every 6 hours PRN Shortness of Breath and/or Wheezing  hydrALAZINE Injectable 10 milliGRAM(s) IV Push every 8 hours PRN SBP>180      New X-rays reviewed:                                                                                  ECHO

## 2024-11-20 NOTE — PROGRESS NOTE ADULT - ASSESSMENT
IMPRESSION:    Acute hypoxemic respiratory failure SP trach and PEG   L lung atelectasis  Septic shock - resolved  UTI  Suspected colon mass  Severe gastric distension w/ pneumatosis along posterior gastric wall  Possible aspiration / LLL consolidation  Intraperitoneal free air  Thrombocytopenia   hypernatremia  gastric ulcer   HAGMA  NOLA non oliguric resolved  Afib on Xarelto  HO HTN  HO ESBL Proteus    PLAN:    CNS: SAT.  Pain control as needed .     HEENT: Oral care. NGT     PULMONARY: Vent changes;  Wean o2 as tolerated.  keep O2 sat 92-96%.  Pulmonary toilet.          CARDIOVASCULAR: TTE. Mod AS. DC LR once tolerating tube feeding.  Goal equal balance. Avoid overload.     GI: GI prophylaxis.  Tube feeding today.  Bowel regimen     RENAL:  Follow up lytes. Correct as needed. No ya.     INFECTIOUS DISEASE: SP Meropenem course. No fevers.      HEMATOLOGICAL: Therapeutic Lovenox on Hold per CTS.  to restart in am. Monitor CBC     ENDOCRINE:  Follow up FS. Insulin protocol if needed.    MUSCULOSKELETAL: Bed chair position.  Off loading    Full code.     Vent unit M

## 2024-11-20 NOTE — PROCEDURE NOTE - NSBRONCHFINDINGS_GEN_A_CORE_FT
Copious gelatinous secretions in the left tracheobronchial tree.
Bilateral thick secretions Lt>Rt side.
Thick gelatinous secretions in Left Lower Lobe

## 2024-11-20 NOTE — CHART NOTE - NSCHARTNOTEFT_GEN_A_CORE
Transfer from: MICU    Transfer to: FLOOR     HPI:   73-year-old female PMH A-fib on xarelto,, HTN, s/p cholecystectomy, congenital solitary kidney presents from NH to the ED for evaluation of weakness, decreased PO intake and abdominal distension. Pt's son Everton says pt has not been feeling well the past few days, and has had poor appetite which is unlike her. She's been more tired and weak, unable to get out of bed. Has not had a bowel movement in 3 days, pt unsure if she's been passing gas otherwise. She was noted to be hypotensive and have an elevated wbc count at the NH as well. No dysuria, chest pain, SOB, cough or fever per pt otherwise.  No other complaints currently.     In the ED, BP 66/45 S/P LR 2800cc bolus with no improvement in BP, started on peripheral levo. Labs with wbc 19.29, vbg lactate 2.0-->2.3, pH 7.2, pCO2 45, Na 133, AG 21, Cr 4.3 (baseline 1.1), UA contaminated. CTAP with severe gastric distension, multiple foci of intraperitoneal free air and urinary bladder thickening with free air. Surgery consulted, no intervention for now, NGT placed for gastric decompression with 3.1L feculent output, recommend GI consult.      (11-03-24 @ 21:25)    HOSPITAL COURSE:  Admitted to MICU for septic shock on pressors 2/2 ESBL proteus (UTI vs. GI source) complicated by gastric distension w/ pneumatosis, NOLA 2/2 ATN, hypernatremia. s/p NGT decompression (-3.1 L feculent output) in ED.   Intubated 11/6 for AHRF. EGD 11/8 w/ multiple ulcers, largest 10cm), path +gastritis. Now with new R colon/cecal mass on CT pending colonoscopy when more stable. Extubated 11/13 to AVAPs, weaned to HFNC however transitioned back to BiPAP after desaturation. Patient was weak and unable to clear secretions despite non-invasive measures. Reintubated 11/18 PM, s/p trach/PEJ 11/19 with thoracic surgery.    Patient is off sedation, off pressors, stable for downgrade to floor.    FOR FOLLOW UP:  [ ] f/u bronchoscopy gram stain/cultures  [ ] chest PT and frequent suctioning for mucus plugging/atelectasis.   [ ] trend BMP for Na and adjust FW flushes PRN  [ ] ok to resume therapeutic lovenox tomorrow 11/21 AM    ASSESSMENT & PLAN:  73-year-old female PMH A-fib on xarelto,, HTN, s/p cholecystectomy, congenital solitary kidney presents from NH to the ED for evaluation of weakness, decreased PO intake and abdominal distension.   Admitted to ICU for septic shock 2/2 ESBL proteus (UTI vs. GI source) complicated by gastric distension w/ pneumatosis, NOLA 2/2 ATN, hypernatremia. s/p NGT decompression (-3.1 L feculent output) in ED. Intubated 11/6 for AHRF. EGD 11/8 w/ multiple ulcers, largest 10cm), path +gastritis. Now with new R colon/cecal mass pending colonoscopy when more stable.   Extubated 11/13 to AVAPs, weaned to HFNC however transitioned back to BiPAP after desaturation. Patient was weak and unable to clear secretions despite non-invasive measures. Reintubated 11/18 PM, s/p trach/PEJ 11/19 with thoracic.     #AHRF 2/2 septic shock due to ESBL proteus (UTI Vs. GI source) s/p intubation x2, s/p trach 11/19  #HAGMA 2/2 lactic acidosis and uremia - resolved   #NOLA likely ATN 2/2 septic shock - resolved   #Possible aspiration  #Congenital solitary kidney  - Intubated 11/6, extubated 11/13 to AVAPS, re-intubated 11/18, s/p trach 11/19  - UCx +ESBL proteus, BCX (-), trach culture with resp rosalva, sputum cx 11/12 resp rosalva  - off pressors, off sedation  - s/p abx (freddie)  - c/w mechanical ventilation  - dilaudid 0.5mg q4h PRN for severe pain  - chest PT/MDI q6h  - bronch today again, f/u BAL (11/18, 11/19)    #Dysphagia 2/2 critical illness s/p PEJ  - S&S eval - failed  - NGT placement - failed multiple times by ICU staff and ENT,  - PEJ w/ thoracic 11/19  - start feeds     #Abdominal distension w/ pneumatosis   #R colon/cecal mass on CT A/P  #Diarrhea   - NGT placed for decompression in ED with 3.1L feculent output   - GI recs, surgery recs, vascular recs appreciated   - s/p EGD 11/8 with multiple ulcers, erosive gastritis  - EGD path: chronic gastritis (-) Hpylori, no malignancy   - CT A/P with PO contrast w/ suspicious hepatic flexure narrowing and lobulated soft tissue density in R colon/cecum.   - TSH wnl  - IV PPI BID   - colonoscopy when more stable as per GI    #Hypernatremia  - FW 100q4h  - monitor Na    #Sacral DTI  - offloading, wound care, pressure injury prevention    #Afib  - hold xarelto   - c/w tx lovenox 1mg/kg q12h (held per surgery, 11/21 AM)    #HTN  - amlodipine 10mg qd PO, losartan 50mg qd PO (home meds)    ---------------------  DVT ppx: holding (resume tomorrow AM)  Diet: NPO w/ TF   GI ppx/Bowel regimen: PPI BID  Activity: bedrest  GOC: full code  Dispo: floor  #Summary/Handoff:  - f/u bronch cx, adjust FW flushes PRN (monitor Na), chest PT, resume AC tomorrow 11/21. Transfer from: MICU    Transfer to: FLOOR     HPI:   73-year-old female PMH A-fib on xarelto,, HTN, s/p cholecystectomy, congenital solitary kidney presents from NH to the ED for evaluation of weakness, decreased PO intake and abdominal distension. Pt's son Everton says pt has not been feeling well the past few days, and has had poor appetite which is unlike her. She's been more tired and weak, unable to get out of bed. Has not had a bowel movement in 3 days, pt unsure if she's been passing gas otherwise. She was noted to be hypotensive and have an elevated wbc count at the NH as well. No dysuria, chest pain, SOB, cough or fever per pt otherwise.  No other complaints currently.     In the ED, BP 66/45 S/P LR 2800cc bolus with no improvement in BP, started on peripheral levo. Labs with wbc 19.29, vbg lactate 2.0-->2.3, pH 7.2, pCO2 45, Na 133, AG 21, Cr 4.3 (baseline 1.1), UA contaminated. CTAP with severe gastric distension, multiple foci of intraperitoneal free air and urinary bladder thickening with free air. Surgery consulted, no intervention for now, NGT placed for gastric decompression with 3.1L feculent output, recommend GI consult.      (11-03-24 @ 21:25)    HOSPITAL COURSE:  Admitted to MICU for septic shock on pressors 2/2 ESBL proteus (UTI vs. GI source) complicated by gastric distension w/ pneumatosis, NOLA 2/2 ATN, hypernatremia. s/p NGT decompression (-3.1 L feculent output) in ED.   Intubated 11/6 for AHRF. EGD 11/8 w/ multiple ulcers, largest 10cm), path +gastritis. Now with new R colon/cecal mass on CT pending colonoscopy when more stable. Extubated 11/13 to AVAPs, weaned to HFNC however transitioned back to BiPAP after desaturation. Patient was weak and unable to clear secretions despite non-invasive measures. Reintubated 11/18 PM, s/p trach/PEJ 11/19 with thoracic surgery.    Patient is off sedation, off pressors, stable for downgrade to floor.    FOR FOLLOW UP:  [ ] f/u bronchoscopy gram stain/cultures  [ ] chest PT and frequent suctioning for mucus plugging/atelectasis.   [ ] trend BMP for Na and adjust FW flushes PRN  [ ] ok to resume therapeutic lovenox tomorrow 11/21 AM    ASSESSMENT & PLAN:  73-year-old female PMH A-fib on xarelto,, HTN, s/p cholecystectomy, congenital solitary kidney presents from NH to the ED for evaluation of weakness, decreased PO intake and abdominal distension.     Admitted to ICU for septic shock requiring pressors 2/2 ESBL proteus (UTI vs. GI source) complicated by gastric distension w/ pneumatosis, NOLA 2/2 ATN, hypernatremia. s/p NGT decompression (-3.1 L feculent output) in ED. Intubated 11/6 for AHRF. EGD 11/8 w/ multiple ulcers, largest 10cm, path +gastritis. Now with new R colon/cecal mass pending colonoscopy when more stable.   Extubated 11/13 to AVAPs, weaned to HFNC however transitioned back to BiPAP after desaturation. Patient was weak and unable to clear secretions despite non-invasive measures. Reintubated 11/18 PM, s/p trach/PEJ 11/19 with thoracic sx.    #AHRF 2/2 septic shock due to ESBL proteus (UTI Vs. GI source) s/p intubation x2, s/p trach 11/19  #HAGMA 2/2 lactic acidosis and uremia - resolved   #NOLA likely ATN 2/2 septic shock - resolved   #Possible aspiration  #Congenital solitary kidney  - Intubated 11/6, extubated 11/13 to AVAPS, re-intubated 11/18, s/p trach 11/19  - UCx +ESBL proteus, BCX (-), trach culture with resp rosalva, sputum cx 11/12 resp rosalva  - off pressors, off sedation  - s/p abx (finished 11/13)  - c/w mechanical ventilation, SBT as tolerated   - c/w dilaudid 0.5mg q4h PRN for severe pain  - c/w chest PT/MDI q6h  - bronch today again, f/u bronch cx/gram stain (11/18, 11/19)    #Dysphagia 2/2 critical illness s/p PEJ  - S&S eval - failed  - NGT placement - failed multiple times by ICU staff and ENT,  - PEJ w/ thoracic 11/19  - start feeds today    #R colon/cecal mass on CT A/P  #Multiple gastric ulcers iso chronic gastritis   #Abdominal distension w/ pneumatosis - resolved   #Diarrhea - improved   - CT A/P on admission with severe gastric distension with new pneumatosis along posterior gastric wall suspicious for ischemia, foci of free intraperitoneal air ; suspicious portal venous gas; thick bladder wall (cystitis vs colovesicular fistula)   - NGT placed for decompression in ED with 3.1L feculent output   - GI recs, surgery recs, vascular recs appreciated >> planned for EGD (pneumatosis possibly 2/2 distension per surgery)  - s/p EGD 11/8 with multiple ulcers, erosive gastritis  - EGD path: chronic gastritis (-) Hpylori, no malignancy   - repeat CT A/P with PO contrast 11.8 w/ suspicious hepatic flexure narrowing and lobulated soft tissue density in R colon/cecum.   - TSH wnl, Cdiff (-)  - IV PPI BID   - colonoscopy when more stable as per GI    #Hypernatremia  - FW 100q4h, adjust PRN  - monitor Na    #Sacral DTI  - offloading, wound care, pressure injury prevention    #Afib  - hold xarelto   - holding tx lovenox 1mg/kg q12h (held per surgery, 11/21 AM)    #HTN  - amlodipine 10mg qd PO, losartan 50mg qd PO (home meds)    ---------------------  DVT ppx: holding (resume tomorrow AM)  Diet: NPO w/ PEJ feeding  GI ppx/Bowel regimen: PPI BID  Activity: bedrest  GOC: full code  Dispo: floor    #Summary/Handoff:  - f/u bronch cx, adjust FW flushes PRN (monitor Na), chest PT, resume AC tomorrow 11/21.

## 2024-11-20 NOTE — PHARMACOTHERAPY INTERVENTION NOTE - COMMENTS
Dr. Dixon Rosales was contacted via teams recommending to change Nifedipine XL 60mg to Cardizem or Amlodipine.  Pt has NG tube
per GI note, gastric distension s/p decompression, fentanyl drip titrate CPOT<3, pt 91.6kg, infusion @ 5mcg/kg/hr, d/w med team, will taper fentanyl off
SCr 1.2 CrCl ~49, recommended increasing meropenem 500mg to 1g IV q12h
d/w med team-change pantoprazole 40mg IV to suspension via PEG q12h
magnesium 2g IV q2h, pt received 3 doses, d/w med team, d/c 
fentanyl infusion titrate RASS, recommended changing titration to CPOT<3
pt intubated, d/w team to evaluate albuterol neb q6h & NaCl inhal, d/c NaCl inh, change albuterol to HFA 2 puffs q6h prn
As per policy, ordered a vancomycin level for 11/5 AM in order to assist with vancomycin pharmacokinetic monitoring.    Tova Jane, PharmD, BCIDP  Clinical Pharmacy Specialist, Infectious Diseases  Tele-Antimicrobial Stewardship Program (Tele-ASP)  Tele-ASP Phone: [phone]  
Mg 1.6-2nd dose of Magnesium 2g IV x1 -d/w team to time for 10:00  K 3.2-KCl 20meq IV q2h x2, new KLor 40meq peg x1 d/w team time for 12:00
recommended checking TG level
Consistent with ID note, recommended to hold vancomycin order since CrCl ~ 18 mL/min.    Tova Jane, PharmD, BCIDP  Clinical Pharmacy Specialist, Infectious Diseases  Tele-Antimicrobial Stewardship Program (Tele-[facility])  Tele-ASP Phone: [phone]  
Recommended to discontinue vancomycin dosed by level since the urine culture grew Streptococcus gallolyticus.    Tova Jane, PharmD, BCIDP  Clinical Pharmacy Specialist, Infectious Diseases  Tele-Antimicrobial Stewardship Program (Tele-[facility])  Tele-ASP Phone: [phone]  
per ID recommendation -d/c meropenem, monitor off ABX, d/w med team

## 2024-11-20 NOTE — PROCEDURE NOTE - NSBRONCHSPECIMENS_GEN_ALL_CORE
Gram Stain and Culture/Fungal Culture/Acid Fast Culture
Gram Stain and Culture/Fungal Culture/Acid Fast Culture
Gram Stain and Culture/Fungal Culture/Viral Culture

## 2024-11-20 NOTE — PROGRESS NOTE ADULT - ASSESSMENT
73-year-old female PMH A-fib on xarelto,, HTN, s/p cholecystectomy, congenital solitary kidney presents from NH to the ED for evaluation of weakness, decreased PO intake and abdominal distension.   Admitted to ICU for septic shock 2/2 ESBL proteus (UTI vs. GI source) complicated by gastric distension w/ pneumatosis, NOLA 2/2 ATN, hypernatremia. s/p NGT decompression (-3.1 L feculent output) in ED. Intubated 11/6 for AHRF. EGD 11/8 w/ multiple ulcers, largest 10cm), path +gastritis. Now with new R colon/cecal mass pending colonoscopy when more stable.   Extubated 11/13 to AVAPs, weaned to HFNC however transitioned back to BiPAP after desaturation. Patient was weak and unable to clear secretions despite non-invasive measures. Reintubated 11/18 PM, s/p trach/PEJ 11.19 with thoracic.     #AHRF 2/2 septic shock due to ESBL proteus (UTI Vs. GI source) s/p intubation x2, s/p trach 11.19  #HAGMA 2/2 lactic acidosis and uremia - resolved   #NOLA likely ATN 2/2 septic shock - resolved   #Possible aspiration  #Congenital solitary kidney  - Intubated 11/6, extubated 11/13 to AVAPS, re-intubated 11/18, s/p trach 11/19  - UCx +ESBL proteus, BCX (-), trach culture with resp rosalva, sputum cx 11/12 resp rosalva  - s/p abx (freddie)  - c/w mechanical ventilation  - dc sedation  - daily SAT, chest PT   - bronch today again, f/u BAL    #Dysphagia 2/2 critical illness s/p PEJ  - S&S eval - failed  - NGT placement - failed multiple times by ICU staff and ENT,  - PEJ w/ thoracic 11.19  - start feeds     #Abdominal distension w/ pneumatosis   #R colon/cecal mass on CT A/P  #Diarrhea   - NGT placed for decompression in ED with 3.1L feculent output   - GI recs, surgery recs, vascular recs appreciated   - s/p EGD 11/8 with multiple ulcers, erosive gastritis  - EGD path: chronic gastritis (-) Hpylori, no malignancy   - CT A/P with PO contrast w/ suspicious hepatic flexure narrowing and lobulated soft tissue density in R colon/cecum.   - TSH wnl  - IV PPI BID   - colonoscopy when more stable as per GI    #Hypernatremia  - FW 100q4h  - monitor Na    #Sacral DTI  - offloading, wound care, pressure injury prevention    #Afib  - hold xarelto   - c/w tx lovenox 1mg/kg q12h (holding for procedure, restart tomorrow AM)    #HTN  - amlodipine 10mg qd PO, losartan 50mg qd PO home meds     ---------------------  DVT ppx: holding  Diet: NPO w/ TF   GI ppx/Bowel regimen: PPI BID  Activity: bedrest  GOC: full code  Dispo: ICU, poss DG today  #Summary/Handoff:  - bronch, start feeds/FW, restart AC tomorrow, chest PT

## 2024-11-20 NOTE — PROGRESS NOTE ADULT - SUBJECTIVE AND OBJECTIVE BOX
Patient is a 73y old Female who presents with a chief complaint of abd pain (11 Nov 2024 08:22)    Overnight events/Interval History:  - No overnight events, s/p trach/PEJ  - At bedside, patient opens eyes to voice, following commands, tracking    Code Status: full code    ALLERGIES:  No Known Allergies    MEDICATIONS:  STANDING MEDICATIONS  amLODIPine   Tablet 10 milliGRAM(s) Oral daily  chlorhexidine 0.12% Liquid 15 milliLiter(s) Oral Mucosa every 12 hours  chlorhexidine 2% Cloths 1 Application(s) Topical <User Schedule>  fentaNYL    Injectable 100 MICROGram(s) IV Push once  ipratropium 17 MICROgram(s) HFA Inhaler 2 Puff(s) Inhalation every 6 hours  lactated ringers. 1000 milliLiter(s) IV Continuous <Continuous>  losartan 50 milliGRAM(s) Oral daily  nystatin Cream 1 Application(s) Topical two times a day  pantoprazole  Injectable 40 milliGRAM(s) IV Push two times a day  vitamin A & D Ointment 1 Application(s) Topical two times a day    PRN MEDICATIONS  acetaminophen     Tablet .. 650 milliGRAM(s) Oral every 6 hours PRN  albuterol    90 MICROgram(s) HFA Inhaler 2 Puff(s) Inhalation every 6 hours PRN  hydrALAZINE Injectable 10 milliGRAM(s) IV Push every 8 hours PRN  HYDROmorphone  Injectable 0.5 milliGRAM(s) IV Push every 4 hours PRN    ICU Vital Signs Last 24 Hrs  T(C): 36.7 (20 Nov 2024 08:00), Max: 36.8 (19 Nov 2024 16:00)  T(F): 98 (20 Nov 2024 08:00), Max: 98.3 (19 Nov 2024 16:00)  HR: 82 (20 Nov 2024 11:00) (66 - 89)  BP: 153/66 (20 Nov 2024 11:00) (117/58 - 161/71)  BP(mean): 96 (20 Nov 2024 11:00) (79 - 107)  ABP: --  ABP(mean): --  RR: 22 (20 Nov 2024 11:00) (12 - 33)  SpO2: 96% (20 Nov 2024 11:00) (95% - 100%)    O2 Parameters below as of 20 Nov 2024 08:00  Patient On (Oxygen Delivery Method): ventilator    O2 Concentration (%): 50      PHYSICAL EXAM:  GENERAL: NAD  HEENT:  moist mucous membranes  CHEST/LUNG: decreased BS on L  HEART: Regular rate and rhythm  ABDOMEN: Soft, nontender, mildly distended   EXTREMITIES:  No lower extremity edema bilaterally  NERVOUS SYSTEM: opens eyes to voice, tracking, following commands     LABS:                        8.3    8.90  )-----------( 263      ( 20 Nov 2024 04:32 )             26.8     11-20    147[H]  |  113[H]  |  11  ----------------------------<  81  3.2[L]   |  26  |  0.8    Ca    9.9      20 Nov 2024 04:32  Phos  2.7     11-20  Mg     1.6     11-20    TPro  5.0[L]  /  Alb  2.7[L]  /  TBili  0.7  /  DBili  x   /  AST  14  /  ALT  11  /  AlkPhos  124[H]  11-20    PT/INR - ( 20 Nov 2024 04:32 )   PT: 12.80 sec;   INR: 1.08 ratio         PTT - ( 20 Nov 2024 04:32 )  PTT:33.9 sec  Urinalysis Basic - ( 20 Nov 2024 04:32 )    Color: x / Appearance: x / SG: x / pH: x  Gluc: 81 mg/dL / Ketone: x  / Bili: x / Urobili: x   Blood: x / Protein: x / Nitrite: x   Leuk Esterase: x / RBC: x / WBC x   Sq Epi: x / Non Sq Epi: x / Bacteria: x      ABG - ( 20 Nov 2024 03:33 )  pH, Arterial: 7.37  pH, Blood: x     /  pCO2: 47    /  pO2: 132   / HCO3: 27    / Base Excess: 1.6   /  SaO2: 97.5                  Culture - Fungal, Bronchial (collected 19 Nov 2024 11:25)  Source: Bronchial  Preliminary Report (20 Nov 2024 06:56):    Testing in progress    Culture - Bronchial (collected 19 Nov 2024 11:25)  Source: Bronch Wash  Gram Stain (19 Nov 2024 23:26):    Rare polymorphonuclear leukocytes per low power field    No Squamous epithelial cells per low power field    No organisms seen per oil power field          RADIOLOGY:  CXR  Xray Chest 1 View- PORTABLE-Urgent:   ACC: [id number] EXAM:  XR CHEST PORTABLE URGENT 1V   ORDERED BY: HAYDEN JUAREZ     PROCEDURE DATE:  11/18/2024          INTERPRETATION:  CLINICAL HISTORY: ett adjustment.    COMPARISON: Earlier the same day.    TECHNIQUE: Portable frontal chest radiograph. Adequate positioning.    FINDINGS:    Support devices: Endotracheal tube in satisfactory position. Sidehole   nasogastric tube in the region of the stomach.    Cardiac/mediastinum/hilum: Stable.    Lung parenchyma/Pleura: Left lung opacities/effusion, unchanged.    Skeleton/soft tissues: Stable.      IMPRESSION:    Left lung opacity/effusion, unchanged.    --- End of Report ---            TANJA HU MD; Attending Radiologist  This document has been electronically signed. Nov 19 2024 9:47AM (11-18-24 @ 17:00)  Xray Chest 1 View- PORTABLE-Urgent:   ACC: [id number] EXAM:  XR CHEST PORTABLE URGENT 1V   ORDERED BY: HAYDEN JUAREZ     PROCEDURE DATE:  11/18/2024          INTERPRETATION:  CLINICAL HISTORY: post intubation/og tube.    COMPARISON: Earlier the same day.    TECHNIQUE: Portable frontal chest radiograph. Adequate positioning.    FINDINGS:    Support devices: Endotracheal tube in satisfactory position. Nasogastric   tube with sidehole in the region of the stomach.    Cardiac/mediastinum/hilum: Stable.    Lung parenchyma/Pleura: Continued improved aeration of the left lung.   Persistent opacities right lung. Probable small right pleural effusion    Skeleton/soft tissues: Stable.      IMPRESSION:    Lines and tubes as described. Improved aeration left lung.    --- End of Report ---            TANJA HU MD; Attending Radiologist  This document has been electronically signed. Nov 18 2024  4:14PM (11-18-24 @ 15:38)  Xray Chest 1 View- PORTABLE-Urgent:   ACC: [id number] EXAM:  XR CHEST PORTABLE URGENT 1V   ORDERED BY: HAYDEN JUAREZ     PROCEDURE DATE:  11/18/2024          INTERPRETATION:  CLINICAL HISTORY: atelectasis.    COMPARISON: Same day chest radiograph, 5:09 AM.    TECHNIQUE: Portable frontal chest radiograph. Adequate positioning.    FINDINGS:    Support devices: Telemetry leads overlie the chest.    Cardiac/mediastinum/hilum: Obscured.    Lung parenchyma/Pleura: Improvement in opacification of the left   hemithorax. Redemonstration of right-sided infiltrates.    Skeleton/soft tissues: Stable.      IMPRESSION:    Improvement in opacification of the left hemothorax Pleural effusion   and/or opacifications may be present. Redemonstrated right-sided   infiltrates.    --- End of Report ---          SURINDER VASQUEZ MD; Resident Radiologist  This document has been electronically signed.  TANJA HU MD; Attending Radiologist  This document has been electronically signed. Nov 18 2024  2:14PM (11-18-24 @ 13:56)      CT

## 2024-11-20 NOTE — PROGRESS NOTE ADULT - SUBJECTIVE AND OBJECTIVE BOX
GENERAL SURGERY PROGRESS NOTE    Patient: PATRICIA SPRAGUE , 73y ([birth year])Female   MRN: [id number]  Location: [address]  Visit: 11-03-24 Inpatient  Date: 11-20-24 @ 02:26      Procedure/Dx/Injuries:  s/p trach and PEG    PAST MEDICAL & SURGICAL HISTORY:  HTN (hypertension)      Diabetes mellitus      Obesity      Gastroesophageal reflux disease      Active asthma      Generalized OA      Afib      H/O hernia repair  2011 and revised in 2013      History of cholecystectomy          Vitals:   T(F): 98.2 (11-20-24 @ 00:00), Max: 98.3 (11-19-24 @ 16:00)  HR: 66 (11-20-24 @ 02:00)  BP: 128/58 (11-20-24 @ 02:00)  RR: 12 (11-20-24 @ 02:00)  SpO2: 99% (11-20-24 @ 02:00)  Mode: AC/ CMV (Assist Control/ Continuous Mandatory Ventilation), RR (machine): 14, TV (machine): 360, FiO2: 50, PEEP: 10, ITime: 0.8, MAP: 14, PIP: 35    Diet, NPO      Fluids: lactated ringers.: Solution, 1000 milliLiter(s) infuse at 50 mL/Hr  Special Instructions: while NPO      I & O's:    11-18-24 @ 07:01  -  11-19-24 @ 07:00  --------------------------------------------------------  IN:    dextrose 5% + lactated ringers: 1125 mL    Enteral Tube Flush: 50 mL    FentaNYL: 164.4 mL    IV PiggyBack: 50 mL    Propofol: 98.4 mL  Total IN: 1487.8 mL    OUT:    Intermittent Catheterization - Urethral (mL): 500 mL    Rectal Tube (mL): 200 mL    Voided (mL): 900 mL  Total OUT: 1600 mL    Total NET: -112.2 mL        Bowel Movement: : [] YES [] NO  Flatus: : [] YES [] NO    PHYSICAL EXAM:  General: NAD,   HEENT:  Neck supple trach in place no bleeding or discharge noted   Abdomen: Soft, non-distended, peg tube in place, dressings in place, clean, dry and intact    MEDICATIONS  (STANDING):  amLODIPine   Tablet 10 milliGRAM(s) Oral daily  chlorhexidine 0.12% Liquid 15 milliLiter(s) Oral Mucosa every 12 hours  chlorhexidine 2% Cloths 1 Application(s) Topical <User Schedule>  fentaNYL   Infusion 0.5 MICROgram(s)/kG/Hr (4.58 mL/Hr) IV Continuous <Continuous>  lactated ringers. 1000 milliLiter(s) (50 mL/Hr) IV Continuous <Continuous>  losartan 50 milliGRAM(s) Oral daily  nystatin Cream 1 Application(s) Topical two times a day  pantoprazole  Injectable 40 milliGRAM(s) IV Push two times a day  propofol Infusion 5 MICROgram(s)/kG/Min (2.75 mL/Hr) IV Continuous <Continuous>    MEDICATIONS  (PRN):  acetaminophen     Tablet .. 650 milliGRAM(s) Oral every 6 hours PRN Temp greater or equal to 38C (100.4F)  albuterol    90 MICROgram(s) HFA Inhaler 2 Puff(s) Inhalation every 6 hours PRN Shortness of Breath and/or Wheezing  hydrALAZINE Injectable 10 milliGRAM(s) IV Push every 8 hours PRN SBP>180      DVT PROPHYLAXIS:   GI PROPHYLAXIS: pantoprazole  Injectable 40 milliGRAM(s) IV Push two times a day    ANTICOAGULATION:   ANTIBIOTICS:            LAB/STUDIES:  Labs:  CAPILLARY BLOOD GLUCOSE      POCT Blood Glucose.: 86 mg/dL (19 Nov 2024 20:11)  POCT Blood Glucose.: 84 mg/dL (19 Nov 2024 15:58)  POCT Blood Glucose.: 78 mg/dL (19 Nov 2024 12:30)  POCT Blood Glucose.: 91 mg/dL (19 Nov 2024 08:18)  POCT Blood Glucose.: 90 mg/dL (19 Nov 2024 06:12)  POCT Blood Glucose.: 85 mg/dL (19 Nov 2024 02:31)                          8.3    10.29 )-----------( 250      ( 19 Nov 2024 06:10 )             26.7         11-19    147[H]  |  114[H]  |  11  ----------------------------<  82  4.1   |  24  |  0.8      Calcium: 10.4 mg/dL (11-19-24 @ 06:10)      LFTs:             5.0  | 0.5  | 17       ------------------[110     ( 19 Nov 2024 06:10 )  2.8  | x    | 11          Lipase:x      Amylase:x         Blood Gas Arterial, Lactate: 0.8 mmol/L (11-19-24 @ 03:48)  Blood Gas Arterial, Lactate: 0.9 mmol/L (11-18-24 @ 16:43)    ABG - ( 19 Nov 2024 03:48 )  pH: 7.38  /  pCO2: 49    /  pO2: 120   / HCO3: 29    / Base Excess: 3.0   /  SaO2: 97.4            ABG - ( 18 Nov 2024 16:43 )  pH: 7.43  /  pCO2: 43    /  pO2: 164   / HCO3: 28    / Base Excess: 3.8   /  SaO2: 97.8            ABG - ( 16 Nov 2024 12:21 )  pH: 7.50  /  pCO2: 38    /  pO2: 219   / HCO3: 30    / Base Excess: 6.0   /  SaO2: 96.6              Coags:     x      ----< x       ( 18 Nov 2024 22:45 )     33.2                Urinalysis Basic - ( 19 Nov 2024 06:10 )    Color: x / Appearance: x / SG: x / pH: x  Gluc: 82 mg/dL / Ketone: x  / Bili: x / Urobili: x   Blood: x / Protein: x / Nitrite: x   Leuk Esterase: x / RBC: x / WBC x   Sq Epi: x / Non Sq Epi: x / Bacteria: x        Culture - Bronchial (collected 19 Nov 2024 11:25)  Source: Bronch Wash  Gram Stain (19 Nov 2024 23:26):    Rare polymorphonuclear leukocytes per low power field    No Squamous epithelial cells per low power field    No organisms seen per oil power field        IMAGING:  < from: Xray Chest 1 View-PORTABLE IMMEDIATE (Xray Chest 1 View-PORTABLE IMMEDIATE .) (11.19.24 @ 13:14) >  IMPRESSION:    Interval progression of extensive opacification of the left lung.   Increased opacification right lung with right pleural effusion. Given   progression of disease, consider correlation with CT of the chest.    < end of copied text >

## 2024-11-20 NOTE — PROGRESS NOTE ADULT - ASSESSMENT
73-year-old female PMH A-fib on xarelto, HTN, s/p cholecystectomy, congenital solitary kidney, POD#1 s/p tracheostomy and PEG tube placement today    PLAN:  patient can have meds through G-tube  can start feeds though j-tube today   thoracic team will follow up for trach suture removal in 10 days   Monitor wound and dressings for changes, redress as needed.  rest off the care per primary team     spectra [phone]   73-year-old female PMH A-fib on xarelto, HTN, s/p cholecystectomy, congenital solitary kidney, POD#1 s/p tracheostomy and PEG tube placement today    PLAN:  patient can have meds through G-tube  can start feeds though j-tube today   thoracic team will follow up for trach suture removal in 10 days   hold therapeutic anticoagulation for 48 hours post op  Monitor wound and dressings for changes, redress as needed.  rest off the care per primary team     spectra [phone]   73-year-old female PMH A-fib on xarelto, HTN, s/p cholecystectomy, congenital solitary kidney, POD#1 s/p tracheostomy and PEG tube placement today    PLAN:  patient can have meds through G-tube  can start feeds though j-tube today   thoracic team will follow up for trach suture removal in 10 days   hold therapeutic anticoagulation for 48 hours post op  Monitor wound and dressings for changes, redress as needed.  recall as needed  rest off the care per primary team     spectra [phone]

## 2024-11-20 NOTE — CHART NOTE - NSCHARTNOTEFT_GEN_A_CORE
RD consulted for TF recommendations    Confirmed with MD chao - pt has a GJ tube. Feeding through the J-tube. Medication through the G-tube.    Per Adult-Thoracic Surgery Physician Assistant note on 11/20: s/p tracheostomy and PEG tube placement today. PLAN: patient can have meds through G-tube. Can start feeds though j-tube today.    Per RD follow up note on 11/19:  Nutrition Requirements  Weight Used: 91.6 KG Using ABW -- with consideration for age, BMI, intubated, propofol discontinued    Estimated Energy Needs    Continue []  Adjust [x]  ENERGY: 0185-1707 kcal/day (11-14 kcal/kg) vs. 9083-2215 kcal/day (20-25 kcal/kg)     Estimated Protein Needs    Continue [x]  Adjust []  PROTEIN:  g/day (1.5-2 g/kg IBW 59 KG)     Estimated Fluid Needs        Continue [x]  Adjust []  FLUID: 7221-3699 mL/day (25-30 mL/kg)    Recommendation:  1. Once medically feasible to start TFs and trach/PEJ is placed, recommend: Jevity 1.2 via J-tube, continuous 18hr feeds, total volume: 1350 mL, start rate at 25 mL/hr and increase as tolerated until goal rate of 75 mL/hr is met. ADD no carb prosource 2X/DAILY -- provides 120 kcal, 30g pro total. TF regimen + no carb prosource to provide -- 1740 kcal, 104.9g pro, 1089 mL free water from formula + additional flushes of 50 mL q4hrs -- team to adjust water flushes prn. Continuous feeds for J-tube only.     RD to remain available: Jennifer Rios [phone] or TEAMS

## 2024-11-20 NOTE — PROCEDURE NOTE - NSBRONCHPROCDETAILS_GEN_A_CORE_FT
PROCEDURE PERFORMED:  Bronchoscopy, BAL, and washing.    CONSENT: After explanining the risk and benefit the consent was obtained from the patient's daughter    The patient had been previously intubated and was on mechanical ventilatory support. She was given mild analgosedation for the procedure.  Her FiO2 was increased to 100% during the procedure. The  fiberoptic bronchoscope was introduced through an endotracheal tube adaptor and the tip of the endotracheal tube was noted to be in good position above the tor.   The Right tracheobronchial tree was inspected closely to the level of the subsegmental bronchi. All bronchi are patent with no endobronchial lesions and no mucosal lesions noted.   The Left tracheobronchial tree was inspected and revealed copious gelatinous secretions which were suctioned.  The airways were otherwise patent with mucosa normal   After adequate clearing of secretions was accomplished, the bronchoscope was removed from the patient and the procedure was terminated.   The procedure was completed and all samples were submitted for appropriate studies  The patient tolerated the procedure well and there were no complications. PROCEDURE PERFORMED:  Bronchoscopy, BAL, and washing.    CONSENT: After explaining the risk and benefit the consent was obtained from the patient's daughter    The patient had been previously intubated and was on mechanical ventilatory support. She was given mild analgosedation for the procedure.  Her FiO2 was increased to 100% during the procedure. The  fiberoptic bronchoscope was introduced through an endotracheal tube adaptor and the tip of the endotracheal tube was noted to be in good position above the tor.   The Right tracheobronchial tree was inspected closely to the level of the subsegmental bronchi. All bronchi are patent with no endobronchial lesions and no mucosal lesions noted.   The Left tracheobronchial tree was inspected and revealed copious gelatinous secretions which were suctioned.  The airways were otherwise patent with friable mucosa.  After adequate clearing of secretions was accomplished, the bronchoscope was removed from the patient and the procedure was terminated.   The procedure was completed and all samples were submitted for appropriate studies  The patient tolerated the procedure well and there were no complications.

## 2024-11-20 NOTE — PROCEDURE NOTE - NSICDXIRPREOP_GEN_A_CORE_FT
PRE-OP DIAGNOSIS:  Mucus plug in respiratory tract 12-Nov-2024 08:58:11  Mendoza Ramesh  

## 2024-11-20 NOTE — PROCEDURE NOTE - NSICDXIRPOSTOP_GEN_A_CORE_FT
POST-OP DIAGNOSIS:  Mucus plug in respiratory tract 12-Nov-2024 08:58:15  Mendoza Ramesh  

## 2024-11-20 NOTE — PHARMACOTHERAPY INTERVENTION NOTE - INTERVENTION TYPE RECOOMEND
Therapy Recommended - Alternative treatment
Timing/Frequency of Administration Recommended
IV to PO
Therapy Recommended - Alternative treatment
Duration of Therapy Recommended

## 2024-11-21 LAB
ALBUMIN SERPL ELPH-MCNC: 2.6 G/DL — LOW (ref 3.5–5.2)
ALP SERPL-CCNC: 114 U/L — SIGNIFICANT CHANGE UP (ref 30–115)
ALT FLD-CCNC: 9 U/L — SIGNIFICANT CHANGE UP (ref 0–41)
ANION GAP SERPL CALC-SCNC: 8 MMOL/L — SIGNIFICANT CHANGE UP (ref 7–14)
ANION GAP SERPL CALC-SCNC: 9 MMOL/L — SIGNIFICANT CHANGE UP (ref 7–14)
AST SERPL-CCNC: 10 U/L — SIGNIFICANT CHANGE UP (ref 0–41)
BASOPHILS # BLD AUTO: 0.06 K/UL — SIGNIFICANT CHANGE UP (ref 0–0.2)
BASOPHILS NFR BLD AUTO: 0.8 % — SIGNIFICANT CHANGE UP (ref 0–1)
BILIRUB SERPL-MCNC: 0.7 MG/DL — SIGNIFICANT CHANGE UP (ref 0.2–1.2)
BUN SERPL-MCNC: 11 MG/DL — SIGNIFICANT CHANGE UP (ref 10–20)
BUN SERPL-MCNC: 11 MG/DL — SIGNIFICANT CHANGE UP (ref 10–20)
CALCIUM SERPL-MCNC: 9 MG/DL — SIGNIFICANT CHANGE UP (ref 8.4–10.5)
CALCIUM SERPL-MCNC: 9.6 MG/DL — SIGNIFICANT CHANGE UP (ref 8.4–10.5)
CHLORIDE SERPL-SCNC: 113 MMOL/L — HIGH (ref 98–110)
CHLORIDE SERPL-SCNC: 116 MMOL/L — HIGH (ref 98–110)
CO2 SERPL-SCNC: 25 MMOL/L — SIGNIFICANT CHANGE UP (ref 17–32)
CO2 SERPL-SCNC: 26 MMOL/L — SIGNIFICANT CHANGE UP (ref 17–32)
CREAT SERPL-MCNC: 0.7 MG/DL — SIGNIFICANT CHANGE UP (ref 0.7–1.5)
CREAT SERPL-MCNC: 0.7 MG/DL — SIGNIFICANT CHANGE UP (ref 0.7–1.5)
CULTURE RESULTS: SIGNIFICANT CHANGE UP
EGFR: 92 ML/MIN/1.73M2 — SIGNIFICANT CHANGE UP
EGFR: 92 ML/MIN/1.73M2 — SIGNIFICANT CHANGE UP
EOSINOPHIL # BLD AUTO: 0.35 K/UL — SIGNIFICANT CHANGE UP (ref 0–0.7)
EOSINOPHIL NFR BLD AUTO: 4.4 % — SIGNIFICANT CHANGE UP (ref 0–8)
GLUCOSE BLDC GLUCOMTR-MCNC: 110 MG/DL — HIGH (ref 70–99)
GLUCOSE BLDC GLUCOMTR-MCNC: 132 MG/DL — HIGH (ref 70–99)
GLUCOSE BLDC GLUCOMTR-MCNC: 137 MG/DL — HIGH (ref 70–99)
GLUCOSE BLDC GLUCOMTR-MCNC: 95 MG/DL — SIGNIFICANT CHANGE UP (ref 70–99)
GLUCOSE SERPL-MCNC: 106 MG/DL — HIGH (ref 70–99)
GLUCOSE SERPL-MCNC: 92 MG/DL — SIGNIFICANT CHANGE UP (ref 70–99)
HCT VFR BLD CALC: 24.8 % — LOW (ref 37–47)
HGB BLD-MCNC: 7.8 G/DL — LOW (ref 12–16)
IMM GRANULOCYTES NFR BLD AUTO: 0.6 % — HIGH (ref 0.1–0.3)
LYMPHOCYTES # BLD AUTO: 1.27 K/UL — SIGNIFICANT CHANGE UP (ref 1.2–3.4)
LYMPHOCYTES # BLD AUTO: 16 % — LOW (ref 20.5–51.1)
MAGNESIUM SERPL-MCNC: 1.9 MG/DL — SIGNIFICANT CHANGE UP (ref 1.8–2.4)
MCHC RBC-ENTMCNC: 29.7 PG — SIGNIFICANT CHANGE UP (ref 27–31)
MCHC RBC-ENTMCNC: 31.5 G/DL — LOW (ref 32–37)
MCV RBC AUTO: 94.3 FL — SIGNIFICANT CHANGE UP (ref 81–99)
MONOCYTES # BLD AUTO: 0.72 K/UL — HIGH (ref 0.1–0.6)
MONOCYTES NFR BLD AUTO: 9.1 % — SIGNIFICANT CHANGE UP (ref 1.7–9.3)
NEUTROPHILS # BLD AUTO: 5.48 K/UL — SIGNIFICANT CHANGE UP (ref 1.4–6.5)
NEUTROPHILS NFR BLD AUTO: 69.1 % — SIGNIFICANT CHANGE UP (ref 42.2–75.2)
NIGHT BLUE STAIN TISS: SIGNIFICANT CHANGE UP
NRBC # BLD: 0 /100 WBCS — SIGNIFICANT CHANGE UP (ref 0–0)
PHOSPHATE SERPL-MCNC: 1.4 MG/DL — LOW (ref 2.1–4.9)
PLATELET # BLD AUTO: 261 K/UL — SIGNIFICANT CHANGE UP (ref 130–400)
PMV BLD: 12 FL — HIGH (ref 7.4–10.4)
POTASSIUM SERPL-MCNC: 3 MMOL/L — LOW (ref 3.5–5)
POTASSIUM SERPL-MCNC: 3.4 MMOL/L — LOW (ref 3.5–5)
POTASSIUM SERPL-SCNC: 3 MMOL/L — LOW (ref 3.5–5)
POTASSIUM SERPL-SCNC: 3.4 MMOL/L — LOW (ref 3.5–5)
PROT SERPL-MCNC: 4.9 G/DL — LOW (ref 6–8)
RBC # BLD: 2.63 M/UL — LOW (ref 4.2–5.4)
RBC # FLD: 13.1 % — SIGNIFICANT CHANGE UP (ref 11.5–14.5)
SODIUM SERPL-SCNC: 148 MMOL/L — HIGH (ref 135–146)
SODIUM SERPL-SCNC: 149 MMOL/L — HIGH (ref 135–146)
SPECIMEN SOURCE: SIGNIFICANT CHANGE UP
SPECIMEN SOURCE: SIGNIFICANT CHANGE UP
WBC # BLD: 7.93 K/UL — SIGNIFICANT CHANGE UP (ref 4.8–10.8)
WBC # FLD AUTO: 7.93 K/UL — SIGNIFICANT CHANGE UP (ref 4.8–10.8)

## 2024-11-21 PROCEDURE — 99233 SBSQ HOSP IP/OBS HIGH 50: CPT

## 2024-11-21 PROCEDURE — 71045 X-RAY EXAM CHEST 1 VIEW: CPT | Mod: 26

## 2024-11-21 RX ORDER — POTASSIUM PHOSPHATE, MONOBASIC POTASSIUM PHOSPHATE, DIBASIC INJECTION, 236; 224 MG/ML; MG/ML
30 SOLUTION, CONCENTRATE INTRAVENOUS ONCE
Refills: 0 | Status: COMPLETED | OUTPATIENT
Start: 2024-11-21 | End: 2024-11-21

## 2024-11-21 RX ORDER — IPRATROPIUM BROMIDE AND ALBUTEROL SULFATE 2.5; .5 MG/3ML; MG/3ML
3 SOLUTION RESPIRATORY (INHALATION) EVERY 6 HOURS
Refills: 0 | Status: DISCONTINUED | OUTPATIENT
Start: 2024-11-21 | End: 2024-11-26

## 2024-11-21 RX ADMIN — CHLORHEXIDINE GLUCONATE 15 MILLILITER(S): 1.2 RINSE ORAL at 05:33

## 2024-11-21 RX ADMIN — Medication 1 APPLICATION(S): at 05:33

## 2024-11-21 RX ADMIN — PANTOPRAZOLE SODIUM 40 MILLIGRAM(S): 40 TABLET, DELAYED RELEASE ORAL at 05:32

## 2024-11-21 RX ADMIN — Medication 1 APPLICATION(S): at 17:25

## 2024-11-21 RX ADMIN — LOSARTAN POTASSIUM 50 MILLIGRAM(S): 100 TABLET, FILM COATED ORAL at 05:35

## 2024-11-21 RX ADMIN — NYSTATIN 1 APPLICATION(S): 100000 POWDER TOPICAL at 17:51

## 2024-11-21 RX ADMIN — IPRATROPIUM BROMIDE AND ALBUTEROL SULFATE 3 MILLILITER(S): 2.5; .5 SOLUTION RESPIRATORY (INHALATION) at 15:53

## 2024-11-21 RX ADMIN — POTASSIUM PHOSPHATE, MONOBASIC POTASSIUM PHOSPHATE, DIBASIC INJECTION, 83.33 MILLIMOLE(S): 236; 224 SOLUTION, CONCENTRATE INTRAVENOUS at 13:54

## 2024-11-21 RX ADMIN — Medication 2 PUFF(S): at 15:53

## 2024-11-21 RX ADMIN — Medication 2 PUFF(S): at 20:44

## 2024-11-21 RX ADMIN — ENOXAPARIN SODIUM 90 MILLIGRAM(S): 30 INJECTION SUBCUTANEOUS at 10:24

## 2024-11-21 RX ADMIN — PANTOPRAZOLE SODIUM 40 MILLIGRAM(S): 40 TABLET, DELAYED RELEASE ORAL at 17:24

## 2024-11-21 RX ADMIN — Medication 2 PUFF(S): at 08:12

## 2024-11-21 RX ADMIN — Medication 50 MILLILITER(S): at 00:41

## 2024-11-21 RX ADMIN — CHLORHEXIDINE GLUCONATE 15 MILLILITER(S): 1.2 RINSE ORAL at 17:31

## 2024-11-21 RX ADMIN — CHLORHEXIDINE GLUCONATE 1 APPLICATION(S): 1.2 RINSE ORAL at 05:31

## 2024-11-21 RX ADMIN — Medication 2 PUFF(S): at 04:44

## 2024-11-21 RX ADMIN — AMLODIPINE BESYLATE 10 MILLIGRAM(S): 10 TABLET ORAL at 05:36

## 2024-11-21 RX ADMIN — NYSTATIN 1 APPLICATION(S): 100000 POWDER TOPICAL at 05:32

## 2024-11-21 RX ADMIN — ENOXAPARIN SODIUM 90 MILLIGRAM(S): 30 INJECTION SUBCUTANEOUS at 21:13

## 2024-11-21 NOTE — PROGRESS NOTE ADULT - SUBJECTIVE AND OBJECTIVE BOX
Patient is a 73y old  Female who presents with a chief complaint of abd pain (15 Nov 2024 06:45)        Over Night Events:  on MV         ROS:     All ROS are negative except HPI         PHYSICAL EXAM    ICU Vital Signs Last 24 Hrs  T(C): 37 (21 Nov 2024 05:00), Max: 37 (21 Nov 2024 05:00)  T(F): 98.6 (21 Nov 2024 05:00), Max: 98.6 (21 Nov 2024 05:00)  HR: 71 (21 Nov 2024 05:00) (71 - 89)  BP: 154/73 (21 Nov 2024 05:00) (132/60 - 165/72)  BP(mean): 93 (20 Nov 2024 17:00) (87 - 107)  ABP: --  ABP(mean): --  RR: 19 (21 Nov 2024 05:00) (19 - 33)  SpO2: 97% (21 Nov 2024 05:00) (96% - 100%)    O2 Parameters below as of 21 Nov 2024 05:00  Patient On (Oxygen Delivery Method): ventilator            CONSTITUTIONAL:   NAD    ENT:   Airway patent,   Mouth with normal mucosa.   Trach     EYES:   Pupils equal,   Round and reactive to light.    CARDIAC:   Normal rate,   Regular rhythm.    No edema    RESPIRATORY:   No wheezing  Bilateral BS  Normal chest expansion  Not tachypneic,  No use of accessory muscles    GASTROINTESTINAL:  Abdomen soft,   Non-tender,   No guarding,   + BS    MUSCULOSKELETAL:   Range of motion is not limited,  No clubbing, cyanosis    NEUROLOGICAL:   Alert  Follows simple commands.    SKIN:   Skin normal color for race,   Warm and dry   No evidence of rash.        11-20-24 @ 07:01  -  11-21-24 @ 07:00  --------------------------------------------------------  IN:    Enteral Tube Flush: 500 mL    FentaNYL: 9.2 mL    Jevity 1.2: 585 mL    Lactated Ringers: 550 mL  Total IN: 1644.2 mL    OUT:    Propofol: 0 mL    Rectal Tube (mL): 600 mL    Voided (mL): 1160 mL  Total OUT: 1760 mL    Total NET: -115.8 mL          LABS:                            8.3    8.90  )-----------( 263      ( 20 Nov 2024 04:32 )             26.8                                               11-20    147[H]  |  113[H]  |  11  ----------------------------<  81  3.2[L]   |  26  |  0.8    Ca    9.9      20 Nov 2024 04:32  Phos  2.7     11-20  Mg     1.6     11-20    TPro  5.0[L]  /  Alb  2.7[L]  /  TBili  0.7  /  DBili  x   /  AST  14  /  ALT  11  /  AlkPhos  124[H]  11-20      PT/INR - ( 20 Nov 2024 04:32 )   PT: 12.80 sec;   INR: 1.08 ratio         PTT - ( 20 Nov 2024 04:32 )  PTT:33.9 sec                                       Urinalysis Basic - ( 20 Nov 2024 04:32 )    Color: x / Appearance: x / SG: x / pH: x  Gluc: 81 mg/dL / Ketone: x  / Bili: x / Urobili: x   Blood: x / Protein: x / Nitrite: x   Leuk Esterase: x / RBC: x / WBC x   Sq Epi: x / Non Sq Epi: x / Bacteria: x                                                  LIVER FUNCTIONS - ( 20 Nov 2024 04:32 )  Alb: 2.7 g/dL / Pro: 5.0 g/dL / ALK PHOS: 124 U/L / ALT: 11 U/L / AST: 14 U/L / GGT: x                                                  Culture - Bronchial (collected 20 Nov 2024 12:00)  Source: Bronchial  Gram Stain (20 Nov 2024 23:18):    Moderate polymorphonuclear leukocytes per low power field    No squamous epithelial cells    Few Gram Positive Cocci in Clusters per oil power field    Culture - Fungal, Bronchial (collected 19 Nov 2024 11:25)  Source: Bronchial  Preliminary Report (20 Nov 2024 23:03):    Culture is being performed. Fungal cultures are held for 4 weeks.    Culture - Acid Fast - Bronchial w/Smear (collected 19 Nov 2024 11:25)  Source: Bronchial  Preliminary Report (20 Nov 2024 23:07):    Culture is being performed.    Culture - Bronchial (collected 19 Nov 2024 11:25)  Source: Bronch Wash  Gram Stain (19 Nov 2024 23:26):    Rare polymorphonuclear leukocytes per low power field    No Squamous epithelial cells per low power field    No organisms seen per oil power field  Preliminary Report (20 Nov 2024 18:07):    No growth                                                   Mode: AC/ CMV (Assist Control/ Continuous Mandatory Ventilation)  RR (machine): 14  TV (machine): 360  FiO2: 40  PEEP: 10  ITime: 0.8  MAP: 12  PIP: 21                                      ABG - ( 20 Nov 2024 03:33 )  pH, Arterial: 7.37  pH, Blood: x     /  pCO2: 47    /  pO2: 132   / HCO3: 27    / Base Excess: 1.6   /  SaO2: 97.5                MEDICATIONS  (STANDING):  amLODIPine   Tablet 10 milliGRAM(s) Oral daily  chlorhexidine 0.12% Liquid 15 milliLiter(s) Oral Mucosa every 12 hours  chlorhexidine 2% Cloths 1 Application(s) Topical <User Schedule>  enoxaparin Injectable 90 milliGRAM(s) SubCutaneous every 12 hours  ipratropium 17 MICROgram(s) HFA Inhaler 2 Puff(s) Inhalation every 6 hours  lactated ringers. 1000 milliLiter(s) (50 mL/Hr) IV Continuous <Continuous>  losartan 50 milliGRAM(s) Oral daily  nystatin Cream 1 Application(s) Topical two times a day  pantoprazole   Suspension 40 milliGRAM(s) Oral two times a day  vitamin A & D Ointment 1 Application(s) Topical two times a day    MEDICATIONS  (PRN):  acetaminophen     Tablet .. 650 milliGRAM(s) Oral every 6 hours PRN Temp greater or equal to 38C (100.4F)  albuterol    90 MICROgram(s) HFA Inhaler 2 Puff(s) Inhalation every 6 hours PRN Shortness of Breath and/or Wheezing  HYDROmorphone  Injectable 0.5 milliGRAM(s) IV Push every 4 hours PRN Severe Pain (7 - 10)      New X-rays reviewed:                                                                                  ECHO    CXR interpreted by me:  ASYAL atelectasis

## 2024-11-21 NOTE — PROGRESS NOTE ADULT - ASSESSMENT
73-year-old female PMH A-fib on xarelto,, HTN, s/p cholecystectomy, congenital solitary kidney presents from NH to the ED for evaluation of weakness, decreased PO intake and abdominal distension.   Admitted to ICU for septic shock 2/2 ESBL proteus (UTI vs. GI source) complicated by gastric distension w/ pneumatosis, NOLA 2/2 ATN, hypernatremia. s/p NGT decompression (-3.1 L feculent output) in ED. Intubated 11/6 for AHRF. EGD 11/8 w/ multiple ulcers, largest 10cm), path +gastritis. Now with new R colon/cecal mass pending colonoscopy when more stable.   Extubated 11/13 to AVAPs, weaned to HFNC however transitioned back to BiPAP after desaturation. Patient was weak and unable to clear secretions despite non-invasive measures. Reintubated 11/18 PM, s/p trach/PEJ 11.19 with thoracic.     #AHRF 2/2 septic shock due to ESBL proteus UTI s/p intubation x2, s/p trach 11/19  #HAGMA 2/2 lactic acidosis and uremia - resolved   #NOLA likely ATN 2/2 septic shock - resolved   #Possible aspiration  #Congenital solitary kidney  - Intubated 11/6, extubated 11/13 to AVAPS, re-intubated 11/18, s/p trach placement by CT surg 11/19  - UCx +ESBL proteus, BCX (-), trach culture with resp rosalva, sputum cx 11/12 resp rosalva  - s/p abx (freddie, diflucan, flagyl, vanc)  - c/w mechanical ventilation  - daily SBT, chest PT  - bronch 11/18 and 11/19 for mucus plugging       - few Gram pos cocci in clusters    #Dysphagia 2/2 critical illness s/p PEJ  - S&S eval - failed  - NGT placement - failed multiple times by ICU staff and ENT,  - s/p PEJ placement by CT surg 11/19  - C/w feeds   - Aspiration precautions    #Abdominal distension w/ pneumatosis   #R colon/cecal mass on CT A/P  #Diarrhea   - NGT placed for decompression in ED with 3.1L feculent output   - GI recs, surgery recs, vascular recs appreciated   - s/p EGD 11/8 with multiple ulcers, erosive gastritis  - EGD path: chronic gastritis (-) Hpylori, no malignancy   - CT A/P with PO contrast w/ suspicious hepatic flexure narrowing and lobulated soft tissue density in R colon/cecum.   - TSH wnl  - IV PPI BID   - colonoscopy when more stable as per GI    #Hypernatremia  - FW 100q4h  - monitor Na    #Sacral DTI  - offloading, wound care, pressure injury prevention    #Afib  - hold xarelto   - c/w tx lovenox 1mg/kg q12h (holding for procedure, restart tomorrow AM)    #HTN  - amlodipine 10mg qd PO, losartan 50mg qd PO home meds     ---------------------  DVT ppx: holding  Diet: NPO w/ TF   GI ppx/Bowel regimen: PPI BID  Activity: bedrest  GOC: full code  Dispo: ICU, poss DG today  #Summary/Handoff:  - bronch, start feeds/FW, restart AC tomorrow, chest PT 73-year-old female PMH A-fib on xarelto,, HTN, s/p cholecystectomy, congenital solitary kidney presents from NH to the ED for evaluation of weakness, decreased PO intake and abdominal distension.   Admitted to ICU for septic shock 2/2 ESBL proteus (UTI vs. GI source) complicated by gastric distension w/ pneumatosis, NOLA 2/2 ATN, hypernatremia. s/p NGT decompression (-3.1 L feculent output) in ED. Intubated 11/6 for AHRF. EGD 11/8 w/ multiple ulcers, largest 10cm), path +gastritis. Now with new R colon/cecal mass pending colonoscopy when more stable.   Extubated 11/13 to AVAPs, weaned to HFNC however transitioned back to BiPAP after desaturation. Patient was weak and unable to clear secretions despite non-invasive measures. Reintubated 11/18 PM, s/p trach/PEJ 11.19 with thoracic.     #AHRF 2/2 septic shock due to ESBL proteus UTI s/p intubation x2, s/p trach 11/19  #HAGMA 2/2 lactic acidosis and uremia - resolved   #NOLA likely ATN 2/2 septic shock - resolved   #Possible aspiration  #Congenital solitary kidney  - Intubated 11/6, extubated 11/13 to AVAPS, re-intubated 11/18, s/p trach placement by CT surg 11/19  - UCx +ESBL proteus, BCX (-), trach culture with resp rosalva, sputum cx 11/12 resp rosalva  - s/p abx (freddie, diflucan, flagyl, vanc)  - c/w mechanical ventilation  - daily SBT, chest PT  - bronch 11/18 and 11/19 for mucus plugging       - few Gram pos cocci in clusters  - Switched ipratropium inhaler to nebs  - F/u CXR riya    #Dysphagia 2/2 critical illness s/p PEJ  - S&S eval - failed  - NGT placement - failed multiple times by ICU staff and ENT,  - s/p PEJ placement by CT surg 11/19  - C/w feeds   - Aspiration precautions    #Abdominal distension w/ pneumatosis   #R colon/cecal mass on CT A/P  #Diarrhea   - NGT placed for decompression in ED with 3.1L feculent output   - GI recs, surgery recs, vascular recs appreciated   - s/p EGD 11/8 with multiple ulcers, erosive gastritis  - EGD path: chronic gastritis, (-) Hpylori, no malignancy   - CT A/P with PO contrast w/ suspicious hepatic flexure narrowing and lobulated soft tissue density in R colon/cecum.   - TSH wnl  - c/w IV PPI BID   - colonoscopy when more stable as per GI    #Hypernatremia  - FW 100q4h  - monitor Na    #Sacral DTI  - care per wound care team    #Afib  - holding xarelto   - c/w therapeutic lovenox  - TTE 11/05- EF 71%, hyperdynamic, LA enlargement, fibrocalcific aortic valve w/ moderate AS, trivial pericardial effusion    #HTN  - C/w amlodipine 10mg qd PO and losartan 50mg qd PO home meds     #MISC  - DVT ppx: lovenox  - Diet: NPO w/ TF   - GI ppx: Protonix BID  - Activity: as tolerated    Pending: final broch cultures, colonoscopy when more stable, cxr

## 2024-11-21 NOTE — PROGRESS NOTE ADULT - SUBJECTIVE AND OBJECTIVE BOX
SUBJECTIVE/OVERNIGHT EVENTS  Today is hospital day 18d. This morning patient was seen and examined at bedside, resting comfortably in bed. No acute or major events overnight. Patient has no complaints at this time. Denies fever, chills, nausea, vomiting, diarrhea, constipation, hematochezia, dysuria, hematuria, chest pain, palpitations, sob. Patient was informed of their plan of care at this time.    CODE STATUS: FULL    MEDICATIONS  STANDING MEDICATIONS  albuterol/ipratropium for Nebulization 3 milliLiter(s) Nebulizer every 6 hours  amLODIPine   Tablet 10 milliGRAM(s) Oral daily  chlorhexidine 0.12% Liquid 15 milliLiter(s) Oral Mucosa every 12 hours  chlorhexidine 2% Cloths 1 Application(s) Topical <User Schedule>  enoxaparin Injectable 90 milliGRAM(s) SubCutaneous every 12 hours  ipratropium 17 MICROgram(s) HFA Inhaler 2 Puff(s) Inhalation every 6 hours  losartan 50 milliGRAM(s) Oral daily  nystatin Cream 1 Application(s) Topical two times a day  pantoprazole   Suspension 40 milliGRAM(s) Oral two times a day  potassium phosphate IVPB 30 milliMole(s) IV Intermittent once  vitamin A & D Ointment 1 Application(s) Topical two times a day    PRN MEDICATIONS  acetaminophen     Tablet .. 650 milliGRAM(s) Oral every 6 hours PRN  albuterol    90 MICROgram(s) HFA Inhaler 2 Puff(s) Inhalation every 6 hours PRN  HYDROmorphone  Injectable 0.5 milliGRAM(s) IV Push every 4 hours PRN    VITALS  T(F): 98.1 (11-21-24 @ 11:26), Max: 98.6 (11-21-24 @ 05:00)  HR: 72 (11-21-24 @ 11:26) (71 - 87)  BP: 119/70 (11-21-24 @ 11:26) (119/70 - 154/73)  RR: 18 (11-21-24 @ 11:26) (18 - 25)  SpO2: 97% (11-21-24 @ 11:26) (96% - 100%)  POCT Blood Glucose.: 132 mg/dL (11-21-24 @ 12:21)  POCT Blood Glucose.: 110 mg/dL (11-21-24 @ 05:38)  POCT Blood Glucose.: 104 mg/dL (11-20-24 @ 18:08)    PHYSICAL EXAM  GENERAL  ( X ) NAD, lying in bed comfortably     (  ) obtunded     (  ) lethargic     (  ) somnolent    HEAD  ( X ) Atraumatic     (  ) hematoma     (  ) laceration (specify location:       )     NECK  ( X ) Supple     (  ) neck stiffness     (  ) nuchal rigidity     (  )  no JVD     (  ) JVD present ( -- cm)   ( X ) trach present, no erythema or secretions     HEART  Rate -->  ( X ) normal rate    (  ) bradycardic    (  ) tachycardic  Rhythm -->  ( X ) regular    (  ) regularly irregular    (  ) irregularly irregular  Murmurs -->  ( X ) normal s1/s2    (  ) systolic murmur    (  ) diastolic murmur    (  ) continuous murmur     (  ) S3 present    (  ) S4 present    LUNGS  ( X )Unlabored respirations     (  ) tachypnea  ( X ) B/L air entry     (  ) decreased breath sounds in:  (location     )    (  ) no adventitious sound     (  ) crackles     (  ) wheezing      (  ) rhonchi      (specify location:       )  (  ) chest wall tenderness (specify location:       )    ABDOMEN  ( X ) Soft     (  ) tense   |   ( X ) nondistended     (  ) distended   |   ( X ) +BS     (  ) hypoactive bowel sounds     (  ) hyperactive bowel sounds  (  ) nontender     (  ) RUQ tenderness     (  ) RLQ tenderness     (  ) LLQ tenderness     (  ) epigastric tenderness     (  ) diffuse tenderness  (  ) Splenomegaly      (  ) Hepatomegaly      (  ) Jaundice     (  ) ecchymosis   ( X ) tenderness at site of PEG, no erythema or secretions    EXTREMITIES  ( X ) Normal     (  ) Rash     (  ) ecchymosis     (  ) varicose veins      (  ) pitting edema     (  ) non-pitting edema   (  ) ulceration     (  ) gangrene:     (location:     )    NERVOUS SYSTEM  ( X ) A&Ox3     (  ) confused     (  ) lethargic  CN II-XII:     (  ) Intact     (  ) focal deficits  (Specify:     )   Upper extremities:     (  ) strength X/5     (  ) focal deficit (specify:    )  Lower extremities:     (  ) strength  X/5    (  ) focal deficit (specify:    )    SKIN  ( X ) No rashes or lesions     (  ) maculopapular rash     (  ) pustules     (  ) vesicles     (  ) ulcer     (  ) ecchymosis     (specify location:     )      LABS             7.8    7.93  )-----------( 261      ( 11-21-24 @ 05:46 )             24.8     148  |  113  |  11  -------------------------<  106   11-21-24 @ 05:46  3.4  |  26  |  0.7    Ca      9.6     11-21-24 @ 05:46  Phos   1.4     11-21-24 @ 05:46  Mg     1.9     11-21-24 @ 05:46    TPro  4.9  /  Alb  2.6  /  TBili  0.7  /  DBili  x   /  AST  10  /  ALT  9   /  AlkPhos  114  /  GGT  x     11-21-24 @ 05:46    PT/INR - ( 11-20-24 @ 04:32 )   PT: 12.80 sec;   INR: 1.08 ratio  PTT - ( 11-20-24 @ 04:32 )  PTT:33.9 sec      Urinalysis Basic - ( 21 Nov 2024 05:46 )    Color: x / Appearance: x / SG: x / pH: x  Gluc: 106 mg/dL / Ketone: x  / Bili: x / Urobili: x   Blood: x / Protein: x / Nitrite: x   Leuk Esterase: x / RBC: x / WBC x   Sq Epi: x / Non Sq Epi: x / Bacteria: x      ABG - ( 20 Nov 2024 03:33 )  pH, Arterial: 7.37  pH, Blood: x     /  pCO2: 47    /  pO2: 132   / HCO3: 27    / Base Excess: 1.6   /  SaO2: 97.5                Culture - Fungal, Bronchial (collected 20 Nov 2024 12:00)  Source: Bronchial  Preliminary Report (21 Nov 2024 12:35):    Testing in progress    Culture - Acid Fast - Bronchial w/Smear (collected 20 Nov 2024 12:00)  Source: Bronchial    Culture - Bronchial (collected 20 Nov 2024 12:00)  Source: Bronchial  Gram Stain (20 Nov 2024 23:18):    Moderate polymorphonuclear leukocytes per low power field    No squamous epithelial cells    Few Gram Positive Cocci in Clusters per oil power field    Culture - Fungal, Bronchial (collected 19 Nov 2024 11:25)  Source: Bronchial  Preliminary Report (20 Nov 2024 23:03):    Culture is being performed. Fungal cultures are held for 4 weeks.    Culture - Acid Fast - Bronchial w/Smear (collected 19 Nov 2024 11:25)  Source: Bronchial  Preliminary Report (20 Nov 2024 23:07):    Culture is being performed.    Culture - Bronchial (collected 19 Nov 2024 11:25)  Source: Bronch Wash  Gram Stain (19 Nov 2024 23:26):    Rare polymorphonuclear leukocytes per low power field    No Squamous epithelial cells per low power field    No organisms seen per oil power field  Final Report (21 Nov 2024 08:25):    Commensal rosalva consistent with body site      IMAGING  < from: Xray Chest 1 View- PORTABLE-Routine (Xray Chest 1 View- PORTABLE-Routine in AM.) (11.21.24 @ 03:39) >  IMPRESSION:    Unchanged left-sided opacity and effusion.    < end of copied text >

## 2024-11-21 NOTE — PROGRESS NOTE ADULT - ATTENDING COMMENTS
73-year-old female PMH A-fib on xarelto,, HTN, s/p cholecystectomy, congenital solitary kidney presents from NH to the ED for evaluation of weakness, decreased PO intake and abdominal distension.     Admitted to ICU for septic shock requiring pressors 2/2 ESBL proteus (UTI vs. GI source) complicated by gastric distension w/ pneumatosis, NOLA 2/2 ATN, hypernatremia. s/p NGT decompression (-3.1 L feculent output) in ED. Intubated 11/6 for AHRF. EGD 11/8 w/ multiple ulcers, largest 10cm, path +gastritis. Now with new R colon/cecal mass pending colonoscopy when more stable.   Extubated 11/13 to AVAPs, weaned to HFNC however transitioned back to BiPAP after desaturation. Patient was weak and unable to clear secretions despite non-invasive measures. Reintubated 11/18 PM, s/p trach/PEJ 11/19 with thoracic sx.    #AHRF 2/2 septic shock due to ESBL proteus (UTI Vs. GI source) s/p intubation x2, s/p trach 11/19  #HAGMA 2/2 lactic acidosis and uremia - resolved   #NOLA likely ATN 2/2 septic shock - resolved   #Possible aspiration  #Congenital solitary kidney  - Intubated 11/6, extubated 11/13 to AVAPS, re-intubated 11/18, s/p trach 11/19  - UCx +ESBL proteus, BCX (-), trach culture with resp rosalva, sputum cx 11/12 resp rosalva  - off pressors, off sedation  - s/p abx (finished 11/13)  - c/w mechanical ventilation, SBT as tolerated   - pain management,  chest PT/MDI q6h  - post bronch on 11/20, f/u bronch cx/gram stain (11/18, 11/19)  - f/up ID     #Dysphagia 2/2 critical illness s/p PEJ  - PEJ w/ thoracic 11/19- tolerating feedings well      #R colon/cecal mass on CT A/P  #Multiple gastric ulcers iso chronic gastritis   #Abdominal distension w/ pneumatosis - resolved   #Diarrhea - improved   - CT A/P on admission with severe gastric distension with new pneumatosis along posterior gastric wall suspicious for ischemia, foci of free intraperitoneal air ; suspicious portal venous gas; thick bladder wall (cystitis vs colovesicular fistula)   - s/p EGD 11/8 with multiple ulcers, erosive gastritis:  EGD path: chronic gastritis (-) H pylori, no malignancy   - repeated CT A/P with PO contrast 11.8 w/ suspicious hepatic flexure narrowing and lobulated soft tissue density in R colon/cecum.   -  Cdiff (-)  -  PPI BID   - colonoscopy when more stable as per GI    #Hypernatremia- most recent Na 148, cont. free water via peg, daily BMP  # Hypokalemia, Hypomagnesemia- supplemented     #Sacral DTI - offloading, wound care, pressure injury prevention    #Afib- resumed on therapeutic lovenox tx.    #HTN - amlodipine 10mg qd PO, losartan 50mg qd PO (home meds)    DVT ppx:  on lovenox tx.    Diet: NPO w/ PEJ feeding  GI ppx/Bowel regimen: PPI BID    Code status: full code    #Progress Note Handoff: Pending ID f/up, electrolytes supplemented, repeat in am, vent management as per Pulm. team.   Family discussion: yes, medical team Disposition: SNF once medically stable    Total time spent to complete patient's bedside assessment, review medical chart, discuss medical plan of care with covering medical team was more than 55 minutes with >50% of time spent face to face with patient, discussion with patient/family and/or coordination of care

## 2024-11-21 NOTE — PROGRESS NOTE ADULT - ASSESSMENT
IMPRESSION:    Acute hypoxemic respiratory failure SP trach and PEG   Left lung atelectasis SP multiple bronchoscopies   Septic shock - resolved  UTI  Suspected colon mass  Severe gastric distension w/ pneumatosis along posterior gastric wall  Possible aspiration / LLL consolidation  Intraperitoneal free air  Thrombocytopenia   hypernatremia  gastric ulcer   HAGMA  NOLA non oliguric resolved  Afib on Xarelto  HO HTN  HO ESBL Proteus    PLAN:    CNS:  Pain control as needed .     HEENT: Oral care. Trach care     PULMONARY: Vent changes;  Wean o2 as tolerated.  PEEP 8.  Keep O2 sat 92-96%.  Pulmonary toilet.          CARDIOVASCULAR: Avoid overload.     GI: GI prophylaxis.  Tube feeding today.  Bowel regimen     RENAL:  Follow up lytes. Correct as needed. Bladder scans     INFECTIOUS DISEASE: SP Meropenem course.  monitor VS     HEMATOLOGICAL: Restart Therapeutic Lovenox today per CTS.  Monitor CBC     ENDOCRINE:  Follow up FS. Insulin protocol if needed.    MUSCULOSKELETAL: Bed chair position.  Off loading    Full code.     Vent unit    DC planing

## 2024-11-22 LAB
ALBUMIN SERPL ELPH-MCNC: 2.6 G/DL — LOW (ref 3.5–5.2)
ALP SERPL-CCNC: 104 U/L — SIGNIFICANT CHANGE UP (ref 30–115)
ALT FLD-CCNC: 7 U/L — SIGNIFICANT CHANGE UP (ref 0–41)
ANION GAP SERPL CALC-SCNC: 10 MMOL/L — SIGNIFICANT CHANGE UP (ref 7–14)
ANION GAP SERPL CALC-SCNC: 11 MMOL/L — SIGNIFICANT CHANGE UP (ref 7–14)
AST SERPL-CCNC: 15 U/L — SIGNIFICANT CHANGE UP (ref 0–41)
BASOPHILS # BLD AUTO: 0.06 K/UL — SIGNIFICANT CHANGE UP (ref 0–0.2)
BASOPHILS NFR BLD AUTO: 0.7 % — SIGNIFICANT CHANGE UP (ref 0–1)
BILIRUB SERPL-MCNC: 0.4 MG/DL — SIGNIFICANT CHANGE UP (ref 0.2–1.2)
BUN SERPL-MCNC: 12 MG/DL — SIGNIFICANT CHANGE UP (ref 10–20)
BUN SERPL-MCNC: 12 MG/DL — SIGNIFICANT CHANGE UP (ref 10–20)
CALCIUM SERPL-MCNC: 9.2 MG/DL — SIGNIFICANT CHANGE UP (ref 8.4–10.5)
CALCIUM SERPL-MCNC: 9.2 MG/DL — SIGNIFICANT CHANGE UP (ref 8.4–10.5)
CHLORIDE SERPL-SCNC: 111 MMOL/L — HIGH (ref 98–110)
CHLORIDE SERPL-SCNC: 116 MMOL/L — HIGH (ref 98–110)
CO2 SERPL-SCNC: 20 MMOL/L — SIGNIFICANT CHANGE UP (ref 17–32)
CO2 SERPL-SCNC: 23 MMOL/L — SIGNIFICANT CHANGE UP (ref 17–32)
CREAT SERPL-MCNC: 0.6 MG/DL — LOW (ref 0.7–1.5)
CREAT SERPL-MCNC: 0.7 MG/DL — SIGNIFICANT CHANGE UP (ref 0.7–1.5)
CULTURE RESULTS: ABNORMAL
EGFR: 92 ML/MIN/1.73M2 — SIGNIFICANT CHANGE UP
EGFR: 96 ML/MIN/1.73M2 — SIGNIFICANT CHANGE UP
EOSINOPHIL # BLD AUTO: 0.38 K/UL — SIGNIFICANT CHANGE UP (ref 0–0.7)
EOSINOPHIL NFR BLD AUTO: 4.7 % — SIGNIFICANT CHANGE UP (ref 0–8)
GLUCOSE BLDC GLUCOMTR-MCNC: 110 MG/DL — HIGH (ref 70–99)
GLUCOSE BLDC GLUCOMTR-MCNC: 149 MG/DL — HIGH (ref 70–99)
GLUCOSE SERPL-MCNC: 112 MG/DL — HIGH (ref 70–99)
GLUCOSE SERPL-MCNC: 122 MG/DL — HIGH (ref 70–99)
HCT VFR BLD CALC: 25.6 % — LOW (ref 37–47)
HGB BLD-MCNC: 7.8 G/DL — LOW (ref 12–16)
IMM GRANULOCYTES NFR BLD AUTO: 0.6 % — HIGH (ref 0.1–0.3)
LYMPHOCYTES # BLD AUTO: 1.52 K/UL — SIGNIFICANT CHANGE UP (ref 1.2–3.4)
LYMPHOCYTES # BLD AUTO: 18.9 % — LOW (ref 20.5–51.1)
MAGNESIUM SERPL-MCNC: 1.5 MG/DL — LOW (ref 1.8–2.4)
MCHC RBC-ENTMCNC: 29.3 PG — SIGNIFICANT CHANGE UP (ref 27–31)
MCHC RBC-ENTMCNC: 30.5 G/DL — LOW (ref 32–37)
MCV RBC AUTO: 96.2 FL — SIGNIFICANT CHANGE UP (ref 81–99)
MONOCYTES # BLD AUTO: 0.9 K/UL — HIGH (ref 0.1–0.6)
MONOCYTES NFR BLD AUTO: 11.2 % — HIGH (ref 1.7–9.3)
NEUTROPHILS # BLD AUTO: 5.13 K/UL — SIGNIFICANT CHANGE UP (ref 1.4–6.5)
NEUTROPHILS NFR BLD AUTO: 63.9 % — SIGNIFICANT CHANGE UP (ref 42.2–75.2)
NRBC # BLD: 0 /100 WBCS — SIGNIFICANT CHANGE UP (ref 0–0)
PHOSPHATE SERPL-MCNC: 1.9 MG/DL — LOW (ref 2.1–4.9)
PLATELET # BLD AUTO: 135 K/UL — SIGNIFICANT CHANGE UP (ref 130–400)
PMV BLD: 13 FL — HIGH (ref 7.4–10.4)
POTASSIUM SERPL-MCNC: 3.2 MMOL/L — LOW (ref 3.5–5)
POTASSIUM SERPL-MCNC: 3.5 MMOL/L — SIGNIFICANT CHANGE UP (ref 3.5–5)
POTASSIUM SERPL-SCNC: 3.2 MMOL/L — LOW (ref 3.5–5)
POTASSIUM SERPL-SCNC: 3.5 MMOL/L — SIGNIFICANT CHANGE UP (ref 3.5–5)
PROT SERPL-MCNC: 5.2 G/DL — LOW (ref 6–8)
RBC # BLD: 2.66 M/UL — LOW (ref 4.2–5.4)
RBC # FLD: 13.3 % — SIGNIFICANT CHANGE UP (ref 11.5–14.5)
SODIUM SERPL-SCNC: 144 MMOL/L — SIGNIFICANT CHANGE UP (ref 135–146)
SODIUM SERPL-SCNC: 147 MMOL/L — HIGH (ref 135–146)
SPECIMEN SOURCE: SIGNIFICANT CHANGE UP
VIRUS SPEC CULT: SIGNIFICANT CHANGE UP
WBC # BLD: 8.04 K/UL — SIGNIFICANT CHANGE UP (ref 4.8–10.8)
WBC # FLD AUTO: 8.04 K/UL — SIGNIFICANT CHANGE UP (ref 4.8–10.8)

## 2024-11-22 PROCEDURE — 99233 SBSQ HOSP IP/OBS HIGH 50: CPT

## 2024-11-22 PROCEDURE — 99232 SBSQ HOSP IP/OBS MODERATE 35: CPT

## 2024-11-22 PROCEDURE — 71045 X-RAY EXAM CHEST 1 VIEW: CPT | Mod: 26

## 2024-11-22 RX ORDER — 0.9 % SODIUM CHLORIDE 0.9 %
1000 INTRAVENOUS SOLUTION INTRAVENOUS
Refills: 0 | Status: DISCONTINUED | OUTPATIENT
Start: 2024-11-22 | End: 2024-11-23

## 2024-11-22 RX ORDER — POTASSIUM CHLORIDE 600 MG/1
20 TABLET, EXTENDED RELEASE ORAL
Refills: 0 | Status: COMPLETED | OUTPATIENT
Start: 2024-11-22 | End: 2024-11-22

## 2024-11-22 RX ORDER — POTASSIUM PHOSPHATE, MONOBASIC POTASSIUM PHOSPHATE, DIBASIC INJECTION, 236; 224 MG/ML; MG/ML
15 SOLUTION, CONCENTRATE INTRAVENOUS ONCE
Refills: 0 | Status: COMPLETED | OUTPATIENT
Start: 2024-11-22 | End: 2024-11-22

## 2024-11-22 RX ORDER — SODIUM,POTASSIUM PHOSPHATES 278-250MG
1 POWDER IN PACKET (EA) ORAL THREE TIMES A DAY
Refills: 0 | Status: DISCONTINUED | OUTPATIENT
Start: 2024-11-22 | End: 2024-11-22

## 2024-11-22 RX ORDER — SODIUM,POTASSIUM PHOSPHATES 278-250MG
1 POWDER IN PACKET (EA) ORAL THREE TIMES A DAY
Refills: 0 | Status: COMPLETED | OUTPATIENT
Start: 2024-11-23 | End: 2024-11-24

## 2024-11-22 RX ORDER — POTASSIUM CHLORIDE 600 MG/1
20 TABLET, EXTENDED RELEASE ORAL ONCE
Refills: 0 | Status: DISCONTINUED | OUTPATIENT
Start: 2024-11-22 | End: 2024-11-24

## 2024-11-22 RX ADMIN — Medication 25 GRAM(S): at 13:54

## 2024-11-22 RX ADMIN — Medication 25 GRAM(S): at 13:29

## 2024-11-22 RX ADMIN — Medication 1 APPLICATION(S): at 05:12

## 2024-11-22 RX ADMIN — ENOXAPARIN SODIUM 90 MILLIGRAM(S): 30 INJECTION SUBCUTANEOUS at 10:32

## 2024-11-22 RX ADMIN — POTASSIUM CHLORIDE 20 MILLIEQUIVALENT(S): 600 TABLET, EXTENDED RELEASE ORAL at 21:16

## 2024-11-22 RX ADMIN — PANTOPRAZOLE SODIUM 40 MILLIGRAM(S): 40 TABLET, DELAYED RELEASE ORAL at 05:13

## 2024-11-22 RX ADMIN — CHLORHEXIDINE GLUCONATE 1 APPLICATION(S): 1.2 RINSE ORAL at 05:12

## 2024-11-22 RX ADMIN — IPRATROPIUM BROMIDE AND ALBUTEROL SULFATE 3 MILLILITER(S): 2.5; .5 SOLUTION RESPIRATORY (INHALATION) at 20:53

## 2024-11-22 RX ADMIN — Medication 2 PUFF(S): at 03:22

## 2024-11-22 RX ADMIN — POTASSIUM CHLORIDE 50 MILLIEQUIVALENT(S): 600 TABLET, EXTENDED RELEASE ORAL at 10:31

## 2024-11-22 RX ADMIN — Medication 100 MILLILITER(S): at 10:31

## 2024-11-22 RX ADMIN — Medication 2 PUFF(S): at 10:02

## 2024-11-22 RX ADMIN — Medication 1 APPLICATION(S): at 17:17

## 2024-11-22 RX ADMIN — Medication 2 PUFF(S): at 15:31

## 2024-11-22 RX ADMIN — PANTOPRAZOLE SODIUM 40 MILLIGRAM(S): 40 TABLET, DELAYED RELEASE ORAL at 17:17

## 2024-11-22 RX ADMIN — CHLORHEXIDINE GLUCONATE 15 MILLILITER(S): 1.2 RINSE ORAL at 17:17

## 2024-11-22 RX ADMIN — POTASSIUM PHOSPHATE, MONOBASIC POTASSIUM PHOSPHATE, DIBASIC INJECTION, 63.75 MILLIMOLE(S): 236; 224 SOLUTION, CONCENTRATE INTRAVENOUS at 14:26

## 2024-11-22 RX ADMIN — NYSTATIN 1 APPLICATION(S): 100000 POWDER TOPICAL at 05:14

## 2024-11-22 RX ADMIN — POTASSIUM CHLORIDE 50 MILLIEQUIVALENT(S): 600 TABLET, EXTENDED RELEASE ORAL at 13:25

## 2024-11-22 RX ADMIN — Medication 25 GRAM(S): at 16:15

## 2024-11-22 RX ADMIN — POTASSIUM CHLORIDE 20 MILLIEQUIVALENT(S): 600 TABLET, EXTENDED RELEASE ORAL at 21:17

## 2024-11-22 RX ADMIN — NYSTATIN 1 APPLICATION(S): 100000 POWDER TOPICAL at 17:16

## 2024-11-22 RX ADMIN — POTASSIUM CHLORIDE 50 MILLIEQUIVALENT(S): 600 TABLET, EXTENDED RELEASE ORAL at 14:28

## 2024-11-22 RX ADMIN — AMLODIPINE BESYLATE 10 MILLIGRAM(S): 10 TABLET ORAL at 05:13

## 2024-11-22 RX ADMIN — ENOXAPARIN SODIUM 90 MILLIGRAM(S): 30 INJECTION SUBCUTANEOUS at 21:17

## 2024-11-22 RX ADMIN — CHLORHEXIDINE GLUCONATE 15 MILLILITER(S): 1.2 RINSE ORAL at 05:13

## 2024-11-22 RX ADMIN — LOSARTAN POTASSIUM 50 MILLIGRAM(S): 100 TABLET, FILM COATED ORAL at 05:13

## 2024-11-22 NOTE — PROGRESS NOTE ADULT - SUBJECTIVE AND OBJECTIVE BOX
Patient is a 73y old  Female who presents with a chief complaint of septic shock (21 Nov 2024 13:27)        Over Night Events:  ON MV         ROS:     All ROS are negative except HPI         PHYSICAL EXAM    ICU Vital Signs Last 24 Hrs  T(C): 36.7 (21 Nov 2024 19:58), Max: 36.7 (21 Nov 2024 11:26)  T(F): 98 (21 Nov 2024 19:58), Max: 98.1 (21 Nov 2024 11:26)  HR: 69 (22 Nov 2024 03:14) (69 - 78)  BP: 129/75 (21 Nov 2024 19:58) (119/70 - 129/75)  BP(mean): --  ABP: --  ABP(mean): --  RR: 18 (21 Nov 2024 19:58) (18 - 18)  SpO2: 100% (22 Nov 2024 03:14) (97% - 100%)        CONSTITUTIONAL:  Well nourished.  NAD    ENT:   Airway patent,   Mouth with normal mucosa.   Trach     EYES:   Pupils equal,   Round and reactive to light.    CARDIAC:   Normal rate,   Regular rhythm.      RESPIRATORY:   No wheezing  Bilateral BS  Normal chest expansion  Not tachypneic,  No use of accessory muscles    GASTROINTESTINAL:  Abdomen soft,   Non-tender,   No guarding,   + BS    MUSCULOSKELETAL:   No clubbing, cyanosis    NEUROLOGICAL:   Follows simple commands     SKIN:   Skin normal color for race,   No evidence of rash.        11-20-24 @ 07:01  -  11-21-24 @ 07:00  --------------------------------------------------------  IN:    Enteral Tube Flush: 500 mL    FentaNYL: 9.2 mL    Jevity 1.2: 585 mL    Lactated Ringers: 550 mL  Total IN: 1644.2 mL    OUT:    Propofol: 0 mL    Rectal Tube (mL): 600 mL    Voided (mL): 1160 mL  Total OUT: 1760 mL    Total NET: -115.8 mL      11-21-24 @ 07:01  -  11-22-24 @ 06:17  --------------------------------------------------------  IN:    Enteral Tube Flush: 100 mL    Jevity 1.2: 300 mL  Total IN: 400 mL    OUT:    Voided (mL): 400 mL  Total OUT: 400 mL    Total NET: 0 mL          LABS:                            7.8    7.93  )-----------( 261      ( 21 Nov 2024 05:46 )             24.8                                               11-21    149[H]  |  116[H]  |  11  ----------------------------<  92  3.0[L]   |  25  |  0.7    Ca    9.0      21 Nov 2024 20:00  Phos  1.4     11-21  Mg     1.9     11-21    TPro  4.9[L]  /  Alb  2.6[L]  /  TBili  0.7  /  DBili  x   /  AST  10  /  ALT  9   /  AlkPhos  114  11-21                                             Urinalysis Basic - ( 21 Nov 2024 20:00 )    Color: x / Appearance: x / SG: x / pH: x  Gluc: 92 mg/dL / Ketone: x  / Bili: x / Urobili: x   Blood: x / Protein: x / Nitrite: x   Leuk Esterase: x / RBC: x / WBC x   Sq Epi: x / Non Sq Epi: x / Bacteria: x                                                  LIVER FUNCTIONS - ( 21 Nov 2024 05:46 )  Alb: 2.6 g/dL / Pro: 4.9 g/dL / ALK PHOS: 114 U/L / ALT: 9 U/L / AST: 10 U/L / GGT: x                                                  Culture - Acid Fast - Bronchial w/Smear (collected 20 Nov 2024 12:00)  Source: Bronchial    Culture - Bronchial (collected 20 Nov 2024 12:00)  Source: Bronchial  Gram Stain (20 Nov 2024 23:18):    Moderate polymorphonuclear leukocytes per low power field    No squamous epithelial cells    Few Gram Positive Cocci in Clusters per oil power field  Preliminary Report (21 Nov 2024 16:55):    Commensal rosalva consistent with body site    Culture - Fungal, Bronchial (collected 20 Nov 2024 12:00)  Source: Bronchial  Preliminary Report (21 Nov 2024 12:35):    Testing in progress    Culture - Acid Fast - Bronchial w/Smear (collected 19 Nov 2024 11:25)  Source: Bronchial  Preliminary Report (20 Nov 2024 23:07):    Culture is being performed.    Culture - Fungal, Bronchial (collected 19 Nov 2024 11:25)  Source: Bronchial  Preliminary Report (20 Nov 2024 23:03):    Culture is being performed. Fungal cultures are held for 4 weeks.    Culture - Bronchial (collected 19 Nov 2024 11:25)  Source: Bronch Wash  Gram Stain (19 Nov 2024 23:26):    Rare polymorphonuclear leukocytes per low power field    No Squamous epithelial cells per low power field    No organisms seen per oil power field  Final Report (21 Nov 2024 08:25):    Commensal rosalva consistent with body site                                                   Mode: AC/ CMV (Assist Control/ Continuous Mandatory Ventilation)  RR (machine): 14  TV (machine): 360  FiO2: 40  PEEP: 10  ITime: 1  MAP: 12  PIP: 17                                          MEDICATIONS  (STANDING):  albuterol/ipratropium for Nebulization 3 milliLiter(s) Nebulizer every 6 hours  amLODIPine   Tablet 10 milliGRAM(s) Oral daily  chlorhexidine 0.12% Liquid 15 milliLiter(s) Oral Mucosa every 12 hours  chlorhexidine 2% Cloths 1 Application(s) Topical <User Schedule>  enoxaparin Injectable 90 milliGRAM(s) SubCutaneous every 12 hours  ipratropium 17 MICROgram(s) HFA Inhaler 2 Puff(s) Inhalation every 6 hours  losartan 50 milliGRAM(s) Oral daily  nystatin Cream 1 Application(s) Topical two times a day  pantoprazole   Suspension 40 milliGRAM(s) Oral two times a day  vitamin A & D Ointment 1 Application(s) Topical two times a day    MEDICATIONS  (PRN):  acetaminophen     Tablet .. 650 milliGRAM(s) Oral every 6 hours PRN Temp greater or equal to 38C (100.4F)  albuterol    90 MICROgram(s) HFA Inhaler 2 Puff(s) Inhalation every 6 hours PRN Shortness of Breath and/or Wheezing  HYDROmorphone  Injectable 0.5 milliGRAM(s) IV Push every 4 hours PRN Severe Pain (7 - 10)      New X-rays reviewed:                                                                                  ECHO

## 2024-11-22 NOTE — PROGRESS NOTE ADULT - ATTENDING COMMENTS
73-year-old female PMH A-fib on xarelto,, HTN, s/p cholecystectomy, congenital solitary kidney presents from NH to the ED for evaluation of weakness, decreased PO intake and abdominal distension.     Admitted to ICU for septic shock requiring pressors 2/2 ESBL proteus (UTI vs. GI source) complicated by gastric distension w/ pneumatosis, NOLA 2/2 ATN, hypernatremia. s/p NGT decompression (-3.1 L feculent output) in ED. Intubated 11/6 for AHRF. EGD 11/8 w/ multiple ulcers, largest 10cm, path +gastritis. Now with new R colon/cecal mass pending colonoscopy when more stable.   Extubated 11/13 to AVAPs, weaned to HFNC however transitioned back to BiPAP after desaturation. Patient was weak and unable to clear secretions despite non-invasive measures. Reintubated 11/18 PM, s/p trach/PEJ 11/19 with thoracic sx.    #AHRF 2/2 septic shock due to ESBL proteus (UTI Vs. GI source) s/p intubation x2, s/p trach 11/19  #HAGMA 2/2 lactic acidosis and uremia - resolved   #NOLA likely ATN 2/2 septic shock - resolved   #Possible aspiration  #Congenital solitary kidney  - Intubated 11/6, extubated 11/13 to AVAPS, re-intubated 11/18, s/p trach 11/19  - UCx +ESBL proteus, BCX (-), trach culture with resp rosalva, sputum cx 11/12 resp rosalva  - off pressors, off sedation  - s/p abx (finished 11/13)  - c/w mechanical ventilation, SBT as tolerated   - pain management,  chest PT/MDI q6h  - post bronch on 11/20, f/u bronch cx/gram stain (11/18, 11/19)  - f/up ID     #Dysphagia 2/2 critical illness s/p PEJ  - PEJ w/ thoracic 11/19- tolerating feedings well      #R colon/cecal mass on CT A/P  #Multiple gastric ulcers iso chronic gastritis   #Abdominal distension w/ pneumatosis - resolved   #Diarrhea - improved   - CT A/P on admission with severe gastric distension with new pneumatosis along posterior gastric wall suspicious for ischemia, foci of free intraperitoneal air ; suspicious portal venous gas; thick bladder wall (cystitis vs colovesicular fistula)   - s/p EGD 11/8 with multiple ulcers, erosive gastritis:  EGD path: chronic gastritis (-) H pylori, no malignancy   - repeated CT A/P with PO contrast 11.8 w/ suspicious hepatic flexure narrowing and lobulated soft tissue density in R colon/cecum.   -  Cdiff (-)  -  PPI BID   - colonoscopy when more stable as per GI    #Hypernatremia- most recent Na 147, cont. free water via peg, daily BMP  # Hypokalemia, Hypomagnesemia, Hypophosphatemia - supplemented     #Sacral DTI - offloading, wound care, pressure injury prevention    #Afib- resumed on therapeutic lovenox tx.    #HTN - amlodipine 10mg qd PO, losartan 50mg qd PO (home meds)    DVT ppx:  on lovenox tx.    Diet: NPO w/ PEJ feeding  GI ppx/Bowel regimen: PPI BID    Code status: full code    #Progress Note Handoff:  electrolytes supplemented, repeat in am, vent management as per Pulm. team. , d/c to snf planning in 24 to 48 hrs  Family discussion: yes, medical team Disposition: SNF     Total time spent to complete patient's bedside assessment, review medical chart, discuss medical plan of care with covering medical team was more than 55 minutes with >50% of time spent face to face with patient, discussion with patient/family and/or coordination of care .

## 2024-11-22 NOTE — PROGRESS NOTE ADULT - ASSESSMENT
73-year-old female PMH A-fib on xarelto,, HTN, s/p cholecystectomy, congenital solitary kidney presents from NH to the ED for evaluation of weakness, decreased PO intake and abdominal distension.   Admitted to ICU for septic shock 2/2 ESBL proteus (UTI vs. GI source) complicated by gastric distension w/ pneumatosis, NOLA 2/2 ATN, hypernatremia. s/p NGT decompression (-3.1 L feculent output) in ED. Intubated 11/6 for AHRF. EGD 11/8 w/ multiple ulcers, largest 10cm), path +gastritis. Now with new R colon/cecal mass pending colonoscopy when more stable.   Extubated 11/13 to AVAPs, weaned to HFNC however transitioned back to BiPAP after desaturation. Patient was weak and unable to clear secretions despite non-invasive measures. Reintubated 11/18 PM, s/p trach/PEJ 11.19 with thoracic.     #AHRF 2/2 septic shock due to ESBL proteus UTI s/p intubation x2, s/p trach 11/19  #HAGMA 2/2 lactic acidosis and uremia - resolved   #NOLA likely ATN 2/2 septic shock - resolved   #Possible aspiration  #Congenital solitary kidney  - Intubated 11/6, extubated 11/13 to AVAPS, re-intubated 11/18, s/p trach placement by CT surg 11/19  - UCx +ESBL proteus, BCX (-), trach culture with resp rosalva, sputum cx 11/12 resp rosalva  - s/p abx (freddie, diflucan, flagyl, vanc)  - c/w mechanical ventilation  - daily SBT, chest PT  - bronch 11/18 and 11/19 for mucus plugging       - few Gram pos cocci in clusters- commensal rosalva       - Per ID, monitor off abx, if clinically worsens can start linezolid  - Switched ipratropium inhaler to nebs  - CXR 11/22- improved, pending official read    #Dysphagia 2/2 critical illness s/p PEJ  - S&S eval - failed  - NGT placement - failed multiple times by ICU staff and ENT,  - s/p PEJ placement by CT surg 11/19  - C/w feeds   - Aspiration precautions    #Abdominal distension w/ pneumatosis   #R colon/cecal mass on CT A/P  #Diarrhea   - NGT placed for decompression in ED with 3.1L feculent output   - GI recs, surgery recs, vascular recs appreciated   - s/p EGD 11/8 with multiple ulcers, erosive gastritis  - EGD path: chronic gastritis, (-) Hpylori, no malignancy   - CT A/P with PO contrast w/ suspicious hepatic flexure narrowing and lobulated soft tissue density in R colon/cecum.   - TSH wnl  - c/w IV PPI BID   - colonoscopy when more stable as per GI    #Electrolyte Imbalances  - FW 100q4h  - monitor BMP, Mag, Phos  - Start d5W 100ml/hr x 8hr  - repeat 4pm BMP  - replete other lytes as needed    #Sacral DTI  - care per wound care team    #Afib  - holding xarelto   - c/w therapeutic lovenox  - TTE 11/05- EF 71%, hyperdynamic, LA enlargement, fibrocalcific aortic valve w/ moderate AS, trivial pericardial effusion    #HTN  - C/w amlodipine 10mg qd PO and losartan 50mg qd PO home meds     #MISC  - DVT ppx: lovenox  - Diet: NPO w/ TF   - GI ppx: Protonix BID  - Activity: as tolerated    Pending: final bronch cultures, colonoscopy when more stable, resolution of electrolyte imbalances

## 2024-11-22 NOTE — PROGRESS NOTE ADULT - ASSESSMENT
IMPRESSION:    Acute hypoxemic respiratory failure SP trach and PEG   Left lung atelectasis SP multiple bronchoscopies   Septic shock - resolved  UTI  Suspected colon mass  Severe gastric distension w/ pneumatosis along posterior gastric wall  Possible aspiration / LLL consolidation  Intraperitoneal free air  Thrombocytopenia   hypernatremia  gastric ulcer   HAGMA  NOLA non oliguric resolved  Afib on Xarelto  HO HTN  HO ESBL Proteus    PLAN:    CNS:  Pain control as needed .     HEENT: Oral care. Trach care     PULMONARY: Vent changes;  Wean o2 as tolerated.  PEEP 8.  Keep O2 sat 92-96%.  Pulmonary toilet.  CXR noted         CARDIOVASCULAR: Avoid overload.     GI: GI prophylaxis.  Tube feeding today.  Bowel regimen.  Free H2O to correct free H2O deficit     RENAL:  Follow up lytes. Correct as needed. Bladder scans.  Check Mg and K     INFECTIOUS DISEASE: SP Meropenem course.  Monitor VS     HEMATOLOGICAL: Therapeutic Lovenox.  Monitor CBC     ENDOCRINE:  Follow up FS. Insulin protocol if needed.    MUSCULOSKELETAL: Bed chair position.  Off loading    Full code.     Vent unit    DC planing

## 2024-11-22 NOTE — PROGRESS NOTE ADULT - SUBJECTIVE AND OBJECTIVE BOX
SUBJECTIVE/OVERNIGHT EVENTS  Today is hospital day 19d. This morning patient was seen and examined at bedside, resting comfortably in bed. No acute or major events overnight. Patient has no complaints at this time. Denies fever, chills, nausea, vomiting, diarrhea, constipation, hematochezia, dysuria, hematuria, chest pain, palpitations, sob. Patient was informed of their plan of care at this time.    CODE STATUS: FULL    MEDICATIONS  STANDING MEDICATIONS  albuterol/ipratropium for Nebulization 3 milliLiter(s) Nebulizer every 6 hours  amLODIPine   Tablet 10 milliGRAM(s) Oral daily  chlorhexidine 0.12% Liquid 15 milliLiter(s) Oral Mucosa every 12 hours  chlorhexidine 2% Cloths 1 Application(s) Topical <User Schedule>  dextrose 5%. 1000 milliLiter(s) IV Continuous <Continuous>  enoxaparin Injectable 90 milliGRAM(s) SubCutaneous every 12 hours  ipratropium 17 MICROgram(s) HFA Inhaler 2 Puff(s) Inhalation every 6 hours  losartan 50 milliGRAM(s) Oral daily  nystatin Cream 1 Application(s) Topical two times a day  pantoprazole   Suspension 40 milliGRAM(s) Oral two times a day  potassium chloride  20 mEq/100 mL IVPB 20 milliEquivalent(s) IV Intermittent every 2 hours  vitamin A & D Ointment 1 Application(s) Topical two times a day    PRN MEDICATIONS  acetaminophen     Tablet .. 650 milliGRAM(s) Oral every 6 hours PRN  albuterol    90 MICROgram(s) HFA Inhaler 2 Puff(s) Inhalation every 6 hours PRN  HYDROmorphone  Injectable 0.5 milliGRAM(s) IV Push every 4 hours PRN    VITALS  T(F): 97.9 (11-22-24 @ 05:00), Max: 98.1 (11-21-24 @ 11:26)  HR: 80 (11-22-24 @ 05:00) (69 - 80)  BP: 146/73 (11-22-24 @ 05:00) (119/70 - 146/73)  RR: 18 (11-22-24 @ 05:00) (18 - 18)  SpO2: 99% (11-22-24 @ 05:00) (97% - 100%)  POCT Blood Glucose.: 95 mg/dL (11-21-24 @ 23:38)  POCT Blood Glucose.: 137 mg/dL (11-21-24 @ 17:28)  POCT Blood Glucose.: 132 mg/dL (11-21-24 @ 12:21)    PHYSICAL EXAM  GENERAL  ( X ) NAD, lying in bed comfortably     (  ) obtunded     (  ) lethargic     (  ) somnolent    HEAD  ( X ) Atraumatic     (  ) hematoma     (  ) laceration (specify location:       )     NECK  ( X ) Supple     (  ) neck stiffness     (  ) nuchal rigidity     (  )  no JVD     (  ) JVD present ( -- cm)   ( X ) trach present, no erythema or secretions     HEART  Rate -->  ( X ) normal rate    (  ) bradycardic    (  ) tachycardic  Rhythm -->  ( X ) regular    (  ) regularly irregular    (  ) irregularly irregular  Murmurs -->  ( X ) normal s1/s2    (  ) systolic murmur    (  ) diastolic murmur    (  ) continuous murmur     (  ) S3 present    (  ) S4 present    LUNGS  ( X )Unlabored respirations     (  ) tachypnea  ( X ) B/L air entry     (  ) decreased breath sounds in:  (location     )    (  ) no adventitious sound     (  ) crackles     (  ) wheezing      (  ) rhonchi      (specify location:       )  (  ) chest wall tenderness (specify location:       )    ABDOMEN  ( X ) Soft     (  ) tense   |   ( X ) nondistended     (  ) distended   |   ( X ) +BS     (  ) hypoactive bowel sounds     (  ) hyperactive bowel sounds  (  ) nontender     (  ) RUQ tenderness     (  ) RLQ tenderness     (  ) LLQ tenderness     (  ) epigastric tenderness     (  ) diffuse tenderness  (  ) Splenomegaly      (  ) Hepatomegaly      (  ) Jaundice     (  ) ecchymosis   ( X ) tenderness at site of PEG, no erythema or secretions    EXTREMITIES  ( X ) Normal     (  ) Rash     (  ) ecchymosis     (  ) varicose veins      (  ) pitting edema     (  ) non-pitting edema   (  ) ulceration     (  ) gangrene:     (location:     )    NERVOUS SYSTEM  ( X ) A&Ox3     (  ) confused     (  ) lethargic  CN II-XII:     (  ) Intact     (  ) focal deficits  (Specify:     )   Upper extremities:     (  ) strength X/5     (  ) focal deficit (specify:    )  Lower extremities:     (  ) strength  X/5    (  ) focal deficit (specify:    )    SKIN  ( X ) No rashes or lesions     (  ) maculopapular rash     (  ) pustules     (  ) vesicles     (  ) ulcer     (  ) ecchymosis     (specify location:     )      LABS             7.8    8.04  )-----------( 135      ( 11-22-24 @ 08:41 )             25.6     147  |  116  |  12  -------------------------<  122   11-22-24 @ 08:41  3.5  |  20  |  0.7    Ca      9.2     11-22-24 @ 08:41  Phos   1.9     11-22-24 @ 08:41  Mg     1.5     11-22-24 @ 08:41    TPro  5.2  /  Alb  2.6  /  TBili  0.4  /  DBili  x   /  AST  15  /  ALT  7   /  AlkPhos  104  /  GGT  x     11-22-24 @ 08:41        Urinalysis Basic - ( 22 Nov 2024 08:41 )    Color: x / Appearance: x / SG: x / pH: x  Gluc: 122 mg/dL / Ketone: x  / Bili: x / Urobili: x   Blood: x / Protein: x / Nitrite: x   Leuk Esterase: x / RBC: x / WBC x   Sq Epi: x / Non Sq Epi: x / Bacteria: x          Culture - Acid Fast - Bronchial w/Smear (collected 20 Nov 2024 12:00)  Source: Bronchial    Culture - Bronchial (collected 20 Nov 2024 12:00)  Source: Bronchial  Gram Stain (20 Nov 2024 23:18):    Moderate polymorphonuclear leukocytes per low power field    No squamous epithelial cells    Few Gram Positive Cocci in Clusters per oil power field  Final Report (22 Nov 2024 09:09):    Commensal rosalva consistent with body site    Culture - Fungal, Bronchial (collected 20 Nov 2024 12:00)  Source: Bronchial  Preliminary Report (21 Nov 2024 12:35):    Testing in progress    Culture - Acid Fast - Bronchial w/Smear (collected 19 Nov 2024 11:25)  Source: Bronchial  Preliminary Report (20 Nov 2024 23:07):    Culture is being performed.    Culture - Fungal, Bronchial (collected 19 Nov 2024 11:25)  Source: Bronchial  Preliminary Report (22 Nov 2024 09:10):    No growth    Culture - Bronchial (collected 19 Nov 2024 11:25)  Source: Bronch Wash  Gram Stain (19 Nov 2024 23:26):    Rare polymorphonuclear leukocytes per low power field    No Squamous epithelial cells per low power field    No organisms seen per oil power field  Final Report (21 Nov 2024 08:25):    Commensal rosalva consistent with body site

## 2024-11-23 LAB
ALBUMIN SERPL ELPH-MCNC: 2.9 G/DL — LOW (ref 3.5–5.2)
ALP SERPL-CCNC: 106 U/L — SIGNIFICANT CHANGE UP (ref 30–115)
ALT FLD-CCNC: 6 U/L — SIGNIFICANT CHANGE UP (ref 0–41)
ANION GAP SERPL CALC-SCNC: 9 MMOL/L — SIGNIFICANT CHANGE UP (ref 7–14)
AST SERPL-CCNC: 8 U/L — SIGNIFICANT CHANGE UP (ref 0–41)
BASOPHILS # BLD AUTO: 0.06 K/UL — SIGNIFICANT CHANGE UP (ref 0–0.2)
BASOPHILS NFR BLD AUTO: 0.7 % — SIGNIFICANT CHANGE UP (ref 0–1)
BILIRUB SERPL-MCNC: 0.6 MG/DL — SIGNIFICANT CHANGE UP (ref 0.2–1.2)
BUN SERPL-MCNC: 10 MG/DL — SIGNIFICANT CHANGE UP (ref 10–20)
BUN SERPL-MCNC: 11 MG/DL — SIGNIFICANT CHANGE UP (ref 10–20)
C DIFF GDH STL QL: NEGATIVE — SIGNIFICANT CHANGE UP
C DIFF GDH STL QL: SIGNIFICANT CHANGE UP
CALCIUM SERPL-MCNC: 9.1 MG/DL — SIGNIFICANT CHANGE UP (ref 8.4–10.5)
CALCIUM SERPL-MCNC: 9.7 MG/DL — SIGNIFICANT CHANGE UP (ref 8.4–10.5)
CHLORIDE SERPL-SCNC: 111 MMOL/L — HIGH (ref 98–110)
CHLORIDE SERPL-SCNC: 114 MMOL/L — HIGH (ref 98–110)
CO2 SERPL-SCNC: 23 MMOL/L — SIGNIFICANT CHANGE UP (ref 17–32)
CO2 SERPL-SCNC: 24 MMOL/L — SIGNIFICANT CHANGE UP (ref 17–32)
CREAT SERPL-MCNC: 0.6 MG/DL — LOW (ref 0.7–1.5)
CREAT SERPL-MCNC: 0.7 MG/DL — SIGNIFICANT CHANGE UP (ref 0.7–1.5)
EGFR: 92 ML/MIN/1.73M2 — SIGNIFICANT CHANGE UP
EGFR: 96 ML/MIN/1.73M2 — SIGNIFICANT CHANGE UP
EOSINOPHIL # BLD AUTO: 0.46 K/UL — SIGNIFICANT CHANGE UP (ref 0–0.7)
EOSINOPHIL NFR BLD AUTO: 5.1 % — SIGNIFICANT CHANGE UP (ref 0–8)
GLUCOSE BLDC GLUCOMTR-MCNC: 110 MG/DL — HIGH (ref 70–99)
GLUCOSE BLDC GLUCOMTR-MCNC: 121 MG/DL — HIGH (ref 70–99)
GLUCOSE BLDC GLUCOMTR-MCNC: 90 MG/DL — SIGNIFICANT CHANGE UP (ref 70–99)
GLUCOSE SERPL-MCNC: 85 MG/DL — SIGNIFICANT CHANGE UP (ref 70–99)
GLUCOSE SERPL-MCNC: 90 MG/DL — SIGNIFICANT CHANGE UP (ref 70–99)
HCT VFR BLD CALC: 27.7 % — LOW (ref 37–47)
HGB BLD-MCNC: 8.6 G/DL — LOW (ref 12–16)
IMM GRANULOCYTES NFR BLD AUTO: 1 % — HIGH (ref 0.1–0.3)
LYMPHOCYTES # BLD AUTO: 1.54 K/UL — SIGNIFICANT CHANGE UP (ref 1.2–3.4)
LYMPHOCYTES # BLD AUTO: 17.1 % — LOW (ref 20.5–51.1)
MAGNESIUM SERPL-MCNC: 2.1 MG/DL — SIGNIFICANT CHANGE UP (ref 1.8–2.4)
MCHC RBC-ENTMCNC: 29.9 PG — SIGNIFICANT CHANGE UP (ref 27–31)
MCHC RBC-ENTMCNC: 31 G/DL — LOW (ref 32–37)
MCV RBC AUTO: 96.2 FL — SIGNIFICANT CHANGE UP (ref 81–99)
MONOCYTES # BLD AUTO: 0.92 K/UL — HIGH (ref 0.1–0.6)
MONOCYTES NFR BLD AUTO: 10.2 % — HIGH (ref 1.7–9.3)
NEUTROPHILS # BLD AUTO: 5.93 K/UL — SIGNIFICANT CHANGE UP (ref 1.4–6.5)
NEUTROPHILS NFR BLD AUTO: 65.9 % — SIGNIFICANT CHANGE UP (ref 42.2–75.2)
NRBC # BLD: 0 /100 WBCS — SIGNIFICANT CHANGE UP (ref 0–0)
PHOSPHATE SERPL-MCNC: 2.2 MG/DL — SIGNIFICANT CHANGE UP (ref 2.1–4.9)
PLATELET # BLD AUTO: 247 K/UL — SIGNIFICANT CHANGE UP (ref 130–400)
PMV BLD: 11.8 FL — HIGH (ref 7.4–10.4)
POTASSIUM SERPL-MCNC: 3.7 MMOL/L — SIGNIFICANT CHANGE UP (ref 3.5–5)
POTASSIUM SERPL-MCNC: 3.7 MMOL/L — SIGNIFICANT CHANGE UP (ref 3.5–5)
POTASSIUM SERPL-SCNC: 3.7 MMOL/L — SIGNIFICANT CHANGE UP (ref 3.5–5)
POTASSIUM SERPL-SCNC: 3.7 MMOL/L — SIGNIFICANT CHANGE UP (ref 3.5–5)
PROT SERPL-MCNC: 5.2 G/DL — LOW (ref 6–8)
RBC # BLD: 2.88 M/UL — LOW (ref 4.2–5.4)
RBC # FLD: 13 % — SIGNIFICANT CHANGE UP (ref 11.5–14.5)
SODIUM SERPL-SCNC: 144 MMOL/L — SIGNIFICANT CHANGE UP (ref 135–146)
SODIUM SERPL-SCNC: 147 MMOL/L — HIGH (ref 135–146)
WBC # BLD: 9 K/UL — SIGNIFICANT CHANGE UP (ref 4.8–10.8)
WBC # FLD AUTO: 9 K/UL — SIGNIFICANT CHANGE UP (ref 4.8–10.8)

## 2024-11-23 PROCEDURE — 99232 SBSQ HOSP IP/OBS MODERATE 35: CPT

## 2024-11-23 PROCEDURE — 71045 X-RAY EXAM CHEST 1 VIEW: CPT | Mod: 26

## 2024-11-23 PROCEDURE — 99233 SBSQ HOSP IP/OBS HIGH 50: CPT

## 2024-11-23 RX ADMIN — CHLORHEXIDINE GLUCONATE 15 MILLILITER(S): 1.2 RINSE ORAL at 05:09

## 2024-11-23 RX ADMIN — Medication 1 PACKET(S): at 21:03

## 2024-11-23 RX ADMIN — Medication 1 PACKET(S): at 05:10

## 2024-11-23 RX ADMIN — Medication 1 APPLICATION(S): at 17:03

## 2024-11-23 RX ADMIN — NYSTATIN 1 APPLICATION(S): 100000 POWDER TOPICAL at 17:03

## 2024-11-23 RX ADMIN — LOSARTAN POTASSIUM 50 MILLIGRAM(S): 100 TABLET, FILM COATED ORAL at 05:10

## 2024-11-23 RX ADMIN — ENOXAPARIN SODIUM 90 MILLIGRAM(S): 30 INJECTION SUBCUTANEOUS at 21:02

## 2024-11-23 RX ADMIN — AMLODIPINE BESYLATE 10 MILLIGRAM(S): 10 TABLET ORAL at 05:09

## 2024-11-23 RX ADMIN — PANTOPRAZOLE SODIUM 40 MILLIGRAM(S): 40 TABLET, DELAYED RELEASE ORAL at 17:03

## 2024-11-23 RX ADMIN — PANTOPRAZOLE SODIUM 40 MILLIGRAM(S): 40 TABLET, DELAYED RELEASE ORAL at 05:09

## 2024-11-23 RX ADMIN — NYSTATIN 1 APPLICATION(S): 100000 POWDER TOPICAL at 05:10

## 2024-11-23 RX ADMIN — Medication 1 PACKET(S): at 14:07

## 2024-11-23 RX ADMIN — CHLORHEXIDINE GLUCONATE 1 APPLICATION(S): 1.2 RINSE ORAL at 05:10

## 2024-11-23 RX ADMIN — Medication 2 PUFF(S): at 14:48

## 2024-11-23 RX ADMIN — ENOXAPARIN SODIUM 90 MILLIGRAM(S): 30 INJECTION SUBCUTANEOUS at 10:26

## 2024-11-23 RX ADMIN — Medication 1 APPLICATION(S): at 05:09

## 2024-11-23 RX ADMIN — Medication 2 PUFF(S): at 20:07

## 2024-11-23 RX ADMIN — Medication 2 PUFF(S): at 08:37

## 2024-11-23 RX ADMIN — IPRATROPIUM BROMIDE AND ALBUTEROL SULFATE 3 MILLILITER(S): 2.5; .5 SOLUTION RESPIRATORY (INHALATION) at 01:49

## 2024-11-23 RX ADMIN — CHLORHEXIDINE GLUCONATE 15 MILLILITER(S): 1.2 RINSE ORAL at 17:03

## 2024-11-23 NOTE — PHYSICAL THERAPY INITIAL EVALUATION ADULT - NSACTIVITYREC_GEN_A_PT
ther ex's for B UE's: R shoulder flex/ext 10 reps. L UE shoulder flex/ext PROM. B LE's : hip flex/ext, knee flex/ext against manual graded resistance, ankle DF/PF, 10 reps each. Pt educated on goals of PT.

## 2024-11-23 NOTE — PROGRESS NOTE ADULT - SUBJECTIVE AND OBJECTIVE BOX
SUBJECTIVE/OVERNIGHT EVENTS  Today is hospital day 20d. This morning patient was seen and examined at bedside, resting comfortably in bed. Overnight, patient has had diarrhea. Today, patient has no complaints at this time. Denies fever, chills, nausea, vomiting, diarrhea, constipation, hematochezia, dysuria, hematuria, chest pain, palpitations, sob. Patient was informed of their plan of care at this time.    CODE STATUS: FULL    MEDICATIONS  STANDING MEDICATIONS  albuterol/ipratropium for Nebulization 3 milliLiter(s) Nebulizer every 6 hours  amLODIPine   Tablet 10 milliGRAM(s) Oral daily  chlorhexidine 0.12% Liquid 15 milliLiter(s) Oral Mucosa every 12 hours  chlorhexidine 2% Cloths 1 Application(s) Topical <User Schedule>  enoxaparin Injectable 90 milliGRAM(s) SubCutaneous every 12 hours  ipratropium 17 MICROgram(s) HFA Inhaler 2 Puff(s) Inhalation every 6 hours  losartan 50 milliGRAM(s) Oral daily  nystatin Cream 1 Application(s) Topical two times a day  pantoprazole   Suspension 40 milliGRAM(s) Oral two times a day  potassium chloride  20 mEq/100 mL IVPB 20 milliEquivalent(s) IV Intermittent once  potassium phosphate / sodium phosphate Powder (PHOS-NaK) 1 Packet(s) Oral three times a day  vitamin A & D Ointment 1 Application(s) Topical two times a day    PRN MEDICATIONS  acetaminophen     Tablet .. 650 milliGRAM(s) Oral every 6 hours PRN  albuterol    90 MICROgram(s) HFA Inhaler 2 Puff(s) Inhalation every 6 hours PRN  HYDROmorphone  Injectable 0.5 milliGRAM(s) IV Push every 4 hours PRN    VITALS  T(F): 98.1 (11-23-24 @ 05:00), Max: 99.2 (11-22-24 @ 12:00)  HR: 76 (11-23-24 @ 08:37) (68 - 76)  BP: 158/79 (11-23-24 @ 05:00) (143/60 - 158/79)  RR: 19 (11-23-24 @ 05:00) (18 - 19)  SpO2: 100% (11-23-24 @ 08:37) (97% - 100%)  POCT Blood Glucose.: 90 mg/dL (11-23-24 @ 07:02)  POCT Blood Glucose.: 110 mg/dL (11-22-24 @ 17:41)  POCT Blood Glucose.: 149 mg/dL (11-22-24 @ 12:02)    PHYSICAL EXAM  GENERAL  ( X ) NAD, lying in bed comfortably     (  ) obtunded     (  ) lethargic     (  ) somnolent    HEAD  ( X ) Atraumatic     (  ) hematoma     (  ) laceration (specify location:       )     NECK  ( X ) Supple     (  ) neck stiffness     (  ) nuchal rigidity     (  )  no JVD     (  ) JVD present ( -- cm)   ( X ) trach present, no erythema or secretions     HEART  Rate -->  ( X ) normal rate    (  ) bradycardic    (  ) tachycardic  Rhythm -->  ( X ) regular    (  ) regularly irregular    (  ) irregularly irregular  Murmurs -->  ( X ) normal s1/s2    (  ) systolic murmur    (  ) diastolic murmur    (  ) continuous murmur     (  ) S3 present    (  ) S4 present    LUNGS  ( X )Unlabored respirations     (  ) tachypnea  ( X ) B/L air entry     (  ) decreased breath sounds in:  (location     )    (  ) no adventitious sound     (  ) crackles     (  ) wheezing      (  ) rhonchi      (specify location:       )  (  ) chest wall tenderness (specify location:       )    ABDOMEN  ( X ) Soft     (  ) tense   |   ( X ) nondistended     (  ) distended   |   ( X ) +BS     (  ) hypoactive bowel sounds     (  ) hyperactive bowel sounds  ( X ) nontender     (  ) RUQ tenderness     (  ) RLQ tenderness     (  ) LLQ tenderness     (  ) epigastric tenderness     (  ) diffuse tenderness  (  ) Splenomegaly      (  ) Hepatomegaly      (  ) Jaundice     (  ) ecchymosis       EXTREMITIES  ( X ) Normal     (  ) Rash     (  ) ecchymosis     (  ) varicose veins      (  ) pitting edema     (  ) non-pitting edema   (  ) ulceration     (  ) gangrene:     (location:     )    NERVOUS SYSTEM  ( X ) A&Ox3     (  ) confused     (  ) lethargic  CN II-XII:     (  ) Intact     (  ) focal deficits  (Specify:     )   Upper extremities:     (  ) strength X/5     (  ) focal deficit (specify:    )  Lower extremities:     (  ) strength  X/5    (  ) focal deficit (specify:    )    SKIN  ( X ) No rashes or lesions     (  ) maculopapular rash     (  ) pustules     (  ) vesicles     (  ) ulcer     (  ) ecchymosis     (specify location:     )      LABS             8.6    9.00  )-----------( 247      ( 11-23-24 @ 08:40 )             27.7     147  |  114  |  11  -------------------------<  85   11-23-24 @ 08:40  3.7  |  24  |  0.7    Ca      9.7     11-23-24 @ 08:40  Phos   2.2     11-23-24 @ 08:40  Mg     2.1     11-23-24 @ 08:40    TPro  5.2  /  Alb  2.9  /  TBili  0.6  /  DBili  x   /  AST  8   /  ALT  6   /  AlkPhos  106  /  GGT  x     11-23-24 @ 08:40        Urinalysis Basic - ( 23 Nov 2024 08:40 )    Color: x / Appearance: x / SG: x / pH: x  Gluc: 85 mg/dL / Ketone: x  / Bili: x / Urobili: x   Blood: x / Protein: x / Nitrite: x   Leuk Esterase: x / RBC: x / WBC x   Sq Epi: x / Non Sq Epi: x / Bacteria: x          Culture - Fungal, Bronchial (collected 20 Nov 2024 12:00)  Source: Bronchial  Preliminary Report (23 Nov 2024 07:14):    No growth    Culture - Acid Fast - Bronchial w/Smear (collected 20 Nov 2024 12:00)  Source: Bronchial    Culture - Bronchial (collected 20 Nov 2024 12:00)  Source: Bronchial  Gram Stain (20 Nov 2024 23:18):    Moderate polymorphonuclear leukocytes per low power field    No squamous epithelial cells    Few Gram Positive Cocci in Clusters per oil power field  Final Report (22 Nov 2024 09:09):    Commensal rosalva consistent with body site

## 2024-11-23 NOTE — PROGRESS NOTE ADULT - ASSESSMENT
73-year-old female PMH A-fib on xarelto,, HTN, s/p cholecystectomy, congenital solitary kidney presents from NH to the ED for evaluation of weakness, decreased PO intake and abdominal distension.   Admitted to ICU for septic shock 2/2 ESBL proteus (UTI vs. GI source) complicated by gastric distension w/ pneumatosis, NOLA 2/2 ATN, hypernatremia. s/p NGT decompression (-3.1 L feculent output) in ED. Intubated 11/6 for AHRF. EGD 11/8 w/ multiple ulcers, largest 10cm), path +gastritis. Now with new R colon/cecal mass pending colonoscopy when more stable.   Extubated 11/13 to AVAPs, weaned to HFNC however transitioned back to BiPAP after desaturation. Patient was weak and unable to clear secretions despite non-invasive measures. Reintubated 11/18 PM, s/p trach/PEJ 11.19 with thoracic.     #AHRF 2/2 septic shock due to ESBL proteus UTI s/p intubation x2, s/p trach 11/19  #HAGMA 2/2 lactic acidosis and uremia - resolved   #NOLA likely ATN 2/2 septic shock - resolved   #Possible aspiration  #Congenital solitary kidney  - Intubated 11/6, extubated 11/13 to AVAPS, re-intubated 11/18, s/p trach placement by CT surg 11/19  - UCx +ESBL proteus, BCX (-), trach culture with resp rosalva, sputum cx 11/12 resp rosalva  - s/p abx (freddie, diflucan, flagyl, vanc)  - c/w mechanical ventilation  - daily SBT, chest PT  - bronch 11/18 and 11/19 for mucus plugging       - few Gram pos cocci in clusters- commensal rosalva       - Per ID, monitor off abx, if clinically worsens can start linezolid  - Switched ipratropium inhaler to nebs  - CXR 11/22- improved    #Dysphagia 2/2 critical illness s/p PEJ  - S&S eval - failed  - NGT placement - failed multiple times by ICU staff and ENT,  - s/p PEJ placement by CT surg 11/19  - C/w feeds   - Aspiration precautions    #Abdominal distension w/ pneumatosis   #R colon/cecal mass on CT A/P  #Diarrhea- new on 11/23  - NGT placed for decompression in ED with 3.1L feculent output   - GI recs, surgery recs, vascular recs appreciated   - s/p EGD 11/8 with multiple ulcers, erosive gastritis  - EGD path: chronic gastritis, (-) Hpylori, no malignancy   - CT A/P with PO contrast w/ suspicious hepatic flexure narrowing and lobulated soft tissue density in R colon/cecum.   - TSH wnl  - c/w IV PPI BID   - colonoscopy when more stable as per GI  - F/u GI PCR, stool culture, c. diff  - Exchange rectal tube  - Started Banatrol    #Electrolyte Imbalances  - FW 100q4h  - monitor BMP, Mag, Phos  - Start d5W 100ml/hr x 8hr  - repeat 4pm BMP  - replete other lytes as needed    #Sacral DTI  - care per wound care team    #Afib  - holding xarelto   - c/w therapeutic lovenox  - TTE 11/05- EF 71%, hyperdynamic, LA enlargement, fibrocalcific aortic valve w/ moderate AS, trivial pericardial effusion    #HTN  - C/w amlodipine 10mg qd PO and losartan 50mg qd PO home meds     #MISC  - DVT ppx: lovenox  - Diet: NPO w/ TF   - GI ppx: Protonix BID  - Activity: as tolerated    Pending: colonoscopy when more stable, resolution of electrolyte imbalances, F/u GI PCR, stool culture, c. diff, Exchange rectal tube

## 2024-11-23 NOTE — PHYSICAL THERAPY INITIAL EVALUATION ADULT - PERTINENT HX OF CURRENT PROBLEM, REHAB EVAL
hp73-year-old female PMH A-fib on xarelto,, HTN, s/p cholecystectomy, congenital solitary kidney presents from NH to the ED for evaluation of weakness, decreased PO intake and abdominal distension. Pt's son Everton says pt has not been feeling well the past few days, and has had poor appetite which is unlike her. She's been more tired and weak, unable to get out of bed. Has not had a bowel movement in 3 days, pt unsure if she's been passing gas otherwise. She was noted to be hypotensive and have an elevated wbc count at the NH as well. No dysuria, chest pain, SOB, cough or fever per pt otherwise.  No other complaints currently. In the ED, BP 66/45 S/P LR 2800cc bolus with no improvement in BP, started on peripheral levo. Labs with wbc 19.29, vbg lactate 2.0-->2.3, pH 7.2, pCO2 45, Na 133, AG 21, Cr 4.3 (baseline 1.1), UA contaminated. CTAP with severe gastric distension, multiple foci of intraperitoneal free air and urinary bladder thickening with free air. Surgery consulted, no intervention for now, NGT placed for gastric decompression with 3.1L feculent output, recommend GI consult.

## 2024-11-23 NOTE — PROGRESS NOTE ADULT - ATTENDING COMMENTS
73-year-old female PMH A-fib on xarelto,, HTN, s/p cholecystectomy, congenital solitary kidney presents from NH to the ED for evaluation of weakness, decreased PO intake and abdominal distension.     Admitted to ICU for septic shock requiring pressors 2/2 ESBL proteus (UTI vs. GI source) complicated by gastric distension w/ pneumatosis, NOLA 2/2 ATN, hypernatremia. s/p NGT decompression (-3.1 L feculent output) in ED. Intubated 11/6 for AHRF. EGD 11/8 w/ multiple ulcers, largest 10cm, path +gastritis. Now with new R colon/cecal mass pending colonoscopy when more stable.   Extubated 11/13 to AVAPs, weaned to HFNC however transitioned back to BiPAP after desaturation. Patient was weak and unable to clear secretions despite non-invasive measures. Reintubated 11/18 PM, s/p trach/PEJ 11/19 with thoracic sx.    #AHRF 2/2 septic shock due to ESBL proteus (UTI Vs. GI source) s/p intubation x2, s/p trach 11/19  #HAGMA 2/2 lactic acidosis and uremia - resolved   #NOLA likely ATN 2/2 septic shock - resolved   #Possible aspiration  #Congenital solitary kidney  - Intubated 11/6, extubated 11/13 to AVAPS, re-intubated 11/18, s/p trach 11/19  - UCx +ESBL proteus, BCX (-), trach culture with resp rosalva, sputum cx 11/12 resp rosalva  - off pressors, off sedation  - s/p abx (finished 11/13)  - c/w mechanical ventilation, SBT as tolerated   - pain management,  chest PT/MDI q6h  - post bronch on 11/20, f/u bronch cx/gram stain (11/18, 11/19)  - f/up ID     #Dysphagia 2/2 critical illness s/p PEJ  - PEJ w/ thoracic 11/19- tolerating feedings well      #R colon/cecal mass on CT A/P  #Multiple gastric ulcers iso chronic gastritis   #Abdominal distension w/ pneumatosis - resolved   #Diarrhea - recurrent on 11/23- obtain GI PCR  - CT A/P on admission with severe gastric distension with new pneumatosis along posterior gastric wall suspicious for ischemia, foci of free intraperitoneal air ; suspicious portal venous gas; thick bladder wall (cystitis vs colovesicular fistula)   - s/p EGD 11/8 with multiple ulcers, erosive gastritis:  EGD path: chronic gastritis (-) H pylori, no malignancy   - repeated CT A/P with PO contrast 11.8 w/ suspicious hepatic flexure narrowing and lobulated soft tissue density in R colon/cecum.   -  Cdiff (-)  -  PPI BID   - colonoscopy when more stable as per GI    #Hypernatremia- most recent Na 147, cont. free water via peg, daily BMP  # Hypokalemia, Hypomagnesemia, Hypophosphatemia - supplemented     #Sacral DTI - offloading, wound care, pressure injury prevention    #Afib- resumed on therapeutic lovenox tx.    #HTN - amlodipine 10mg qd PO, losartan 50mg qd PO (home meds)    DVT ppx:  on lovenox tx.    Diet: NPO w/ PEJ feeding  GI ppx/Bowel regimen: PPI BID    Code status: full code    #Progress Note Handoff:  electrolytes supplemented, repeat in am, loose bm's, not on laxatives, obtain GI pcr, stool c diff, add banatrol to peg feedings,  vent management as per Pulm. team. , d/c to snf planning in 24 to 48 hrs  Family discussion: yes, medical team Disposition: SNF     Total time spent to complete patient's bedside assessment, review medical chart, discuss medical plan of care with covering medical team was more than 55 minutes with >50% of time spent face to face with patient, discussion with patient/family and/or coordination of care .

## 2024-11-23 NOTE — PROGRESS NOTE ADULT - SUBJECTIVE AND OBJECTIVE BOX
Patient is a 73y old  Female who presents with a chief complaint of acute respiratory heart failure (22 Nov 2024 11:11)      Over Night Events:  Patient seen and examined.   on vent   chart reviewed     ROS:  See HPI    PHYSICAL EXAM    ICU Vital Signs Last 24 Hrs  T(C): 36.7 (23 Nov 2024 05:00), Max: 37.3 (22 Nov 2024 12:00)  T(F): 98.1 (23 Nov 2024 05:00), Max: 99.2 (22 Nov 2024 12:00)  HR: 73 (23 Nov 2024 05:00) (68 - 74)  BP: 158/79 (23 Nov 2024 05:00) (143/60 - 158/79)  BP(mean): --  ABP: --  ABP(mean): --  RR: 19 (23 Nov 2024 05:00) (18 - 19)  SpO2: 100% (23 Nov 2024 05:00) (97% - 100%)    O2 Parameters below as of 23 Nov 2024 05:00  Patient On (Oxygen Delivery Method): ventilator            General: no distress   HEENT:              trach      Lungs: Bilateral BS  Cardiovascular: Regular   Abdomen: Soft, Positive BS  Extremities: No clubbing   Skin: warm   Neurological:   Musculoskeletal: move all ext     I&O's Detail    22 Nov 2024 07:01  -  23 Nov 2024 07:00  --------------------------------------------------------  IN:    Enteral Tube Flush: 100 mL    Jevity 1.2: 300 mL  Total IN: 400 mL    OUT:    Rectal Tube (mL): 300 mL    Voided (mL): 600 mL  Total OUT: 900 mL    Total NET: -500 mL          LABS:                          7.8    8.04  )-----------( 135      ( 22 Nov 2024 08:41 )             25.6         23 Nov 2024 00:35    144    |  111    |  10     ----------------------------<  90     3.7     |  23     |  0.6      Ca    9.1        23 Nov 2024 00:35  Phos  1.9       22 Nov 2024 08:41  Mg     1.5       22 Nov 2024 08:41                                                                                      Urinalysis Basic - ( 23 Nov 2024 00:35 )    Color: x / Appearance: x / SG: x / pH: x  Gluc: 90 mg/dL / Ketone: x  / Bili: x / Urobili: x   Blood: x / Protein: x / Nitrite: x   Leuk Esterase: x / RBC: x / WBC x   Sq Epi: x / Non Sq Epi: x / Bacteria: x                                                                Culture - Fungal, Bronchial (collected 20 Nov 2024 12:00)  Source: Bronchial  Preliminary Report (23 Nov 2024 07:14):    No growth    Culture - Acid Fast - Bronchial w/Smear (collected 20 Nov 2024 12:00)  Source: Bronchial    Culture - Bronchial (collected 20 Nov 2024 12:00)  Source: Bronchial  Gram Stain (20 Nov 2024 23:18):    Moderate polymorphonuclear leukocytes per low power field    No squamous epithelial cells    Few Gram Positive Cocci in Clusters per oil power field  Final Report (22 Nov 2024 09:09):    Commensal rosalva consistent with body site                                                   Mode: AC/ CMV (Assist Control/ Continuous Mandatory Ventilation)  RR (machine): 14  TV (machine): 360  FiO2: 40  PEEP: 10  ITime: 0.8  MAP: 14  PIP: 21                                          MEDICATIONS  (STANDING):  albuterol/ipratropium for Nebulization 3 milliLiter(s) Nebulizer every 6 hours  amLODIPine   Tablet 10 milliGRAM(s) Oral daily  chlorhexidine 0.12% Liquid 15 milliLiter(s) Oral Mucosa every 12 hours  chlorhexidine 2% Cloths 1 Application(s) Topical <User Schedule>  enoxaparin Injectable 90 milliGRAM(s) SubCutaneous every 12 hours  ipratropium 17 MICROgram(s) HFA Inhaler 2 Puff(s) Inhalation every 6 hours  losartan 50 milliGRAM(s) Oral daily  nystatin Cream 1 Application(s) Topical two times a day  pantoprazole   Suspension 40 milliGRAM(s) Oral two times a day  potassium chloride  20 mEq/100 mL IVPB 20 milliEquivalent(s) IV Intermittent once  potassium phosphate / sodium phosphate Powder (PHOS-NaK) 1 Packet(s) Oral three times a day  vitamin A & D Ointment 1 Application(s) Topical two times a day    MEDICATIONS  (PRN):  acetaminophen     Tablet .. 650 milliGRAM(s) Oral every 6 hours PRN Temp greater or equal to 38C (100.4F)  albuterol    90 MICROgram(s) HFA Inhaler 2 Puff(s) Inhalation every 6 hours PRN Shortness of Breath and/or Wheezing  HYDROmorphone  Injectable 0.5 milliGRAM(s) IV Push every 4 hours PRN Severe Pain (7 - 10)          Xrays:  reviewed by me rotated possible LL opacity /atelectasis                                                                                 ECHO:  [facility] ICU:

## 2024-11-23 NOTE — PHYSICAL THERAPY INITIAL EVALUATION ADULT - GAIT TRAINING, PT EVAL
Improve amb 3 step sideways in each direction max assist x 2 person using appropriate asst device by d/c.

## 2024-11-23 NOTE — PHYSICAL THERAPY INITIAL EVALUATION ADULT - GENERAL OBSERVATIONS, REHAB EVAL
11:30-11:56. Pt encountered semifowler in bed in NAD, + trach, + PEG feed, + digney shield, + sequentials R AKIL, asked Anamaria CEDEÑO if ERNESTO BARNARD is supposed to be donned and confirmed it should be. + tele, + pulse oxy, 11:30-11:56. Pt encountered semifowler in bed in NAD, + trach, + PEG feed, + digney shield, +ya sequentials TADEO BARNARD, asked Anamaria CEDEÑO if L LE is supposed to be donned and confirmed it should be. + tele, + pulse oxy, pillow wedges for L UE and R side of trunk

## 2024-11-23 NOTE — PROGRESS NOTE ADULT - ASSESSMENT
IMPRESSION:    Acute hypoxemic respiratory failure SP trach and PEG   Left lung atelectasis SP multiple bronchoscopies   Septic shock - resolved  UTI  Suspected colon mass  Severe gastric distension w/ pneumatosis along posterior gastric wall  Possible aspiration / LLL consolidation  Intraperitoneal free air  Thrombocytopenia   hypernatremia  gastric ulcer   HAGMA  NOLA non oliguric resolved  Afib on Xarelto  HO HTN  HO ESBL Proteus    PLAN:    CNS:  Pain control as needed .     HEENT: Oral care. Trach care     PULMONARY: Vent changes;  Wean o2 as tolerated.  lower PEEP to 8.  Keep O2 sat 92-96%.  Pulmonary toilet.  CXR noted       follow DTA   cxr riya   CARDIOVASCULAR: Avoid overload.     GI: GI prophylaxis.  Tube feeding today.  Bowel regimen.  Free H2O to correct free H2O deficit     RENAL:  Follow up lytes. Correct as needed. Bladder scans.  Check Mg and K     INFECTIOUS DISEASE: SP Meropenem course.  Monitor VS     HEMATOLOGICAL: Therapeutic Lovenox.  Monitor CBC     ENDOCRINE:  Follow up FS. Insulin protocol if needed.    MUSCULOSKELETAL: Bed chair position.  Off loading    Full code.     Vent unit  case discussed with PMd  DC planing

## 2024-11-24 LAB
ALBUMIN SERPL ELPH-MCNC: 2.7 G/DL — LOW (ref 3.5–5.2)
ALP SERPL-CCNC: 88 U/L — SIGNIFICANT CHANGE UP (ref 30–115)
ALT FLD-CCNC: <5 U/L — SIGNIFICANT CHANGE UP (ref 0–41)
ANION GAP SERPL CALC-SCNC: 6 MMOL/L — LOW (ref 7–14)
AST SERPL-CCNC: 10 U/L — SIGNIFICANT CHANGE UP (ref 0–41)
BASOPHILS # BLD AUTO: 0.05 K/UL — SIGNIFICANT CHANGE UP (ref 0–0.2)
BASOPHILS NFR BLD AUTO: 0.7 % — SIGNIFICANT CHANGE UP (ref 0–1)
BILIRUB SERPL-MCNC: 0.4 MG/DL — SIGNIFICANT CHANGE UP (ref 0.2–1.2)
BUN SERPL-MCNC: 13 MG/DL — SIGNIFICANT CHANGE UP (ref 10–20)
CALCIUM SERPL-MCNC: 9.1 MG/DL — SIGNIFICANT CHANGE UP (ref 8.4–10.4)
CHLORIDE SERPL-SCNC: 113 MMOL/L — HIGH (ref 98–110)
CO2 SERPL-SCNC: 24 MMOL/L — SIGNIFICANT CHANGE UP (ref 17–32)
CREAT SERPL-MCNC: 0.6 MG/DL — LOW (ref 0.7–1.5)
EGFR: 96 ML/MIN/1.73M2 — SIGNIFICANT CHANGE UP
EOSINOPHIL # BLD AUTO: 0.44 K/UL — SIGNIFICANT CHANGE UP (ref 0–0.7)
EOSINOPHIL NFR BLD AUTO: 6.1 % — SIGNIFICANT CHANGE UP (ref 0–8)
GI PCR PANEL: ABNORMAL
GLUCOSE BLDC GLUCOMTR-MCNC: 104 MG/DL — HIGH (ref 70–99)
GLUCOSE BLDC GLUCOMTR-MCNC: 113 MG/DL — HIGH (ref 70–99)
GLUCOSE BLDC GLUCOMTR-MCNC: 125 MG/DL — HIGH (ref 70–99)
GLUCOSE BLDC GLUCOMTR-MCNC: 88 MG/DL — SIGNIFICANT CHANGE UP (ref 70–99)
GLUCOSE BLDC GLUCOMTR-MCNC: 98 MG/DL — SIGNIFICANT CHANGE UP (ref 70–99)
GLUCOSE SERPL-MCNC: 92 MG/DL — SIGNIFICANT CHANGE UP (ref 70–99)
HCT VFR BLD CALC: 25.4 % — LOW (ref 37–47)
HGB BLD-MCNC: 7.8 G/DL — LOW (ref 12–16)
IMM GRANULOCYTES NFR BLD AUTO: 1 % — HIGH (ref 0.1–0.3)
LYMPHOCYTES # BLD AUTO: 1.68 K/UL — SIGNIFICANT CHANGE UP (ref 1.2–3.4)
LYMPHOCYTES # BLD AUTO: 23.2 % — SIGNIFICANT CHANGE UP (ref 20.5–51.1)
MAGNESIUM SERPL-MCNC: 1.6 MG/DL — LOW (ref 1.8–2.4)
MCHC RBC-ENTMCNC: 29.3 PG — SIGNIFICANT CHANGE UP (ref 27–31)
MCHC RBC-ENTMCNC: 30.7 G/DL — LOW (ref 32–37)
MCV RBC AUTO: 95.5 FL — SIGNIFICANT CHANGE UP (ref 81–99)
MONOCYTES # BLD AUTO: 0.84 K/UL — HIGH (ref 0.1–0.6)
MONOCYTES NFR BLD AUTO: 11.6 % — HIGH (ref 1.7–9.3)
NEUTROPHILS # BLD AUTO: 4.15 K/UL — SIGNIFICANT CHANGE UP (ref 1.4–6.5)
NEUTROPHILS NFR BLD AUTO: 57.4 % — SIGNIFICANT CHANGE UP (ref 42.2–75.2)
NOROVIRUS GI+II RNA STL QL NAA+NON-PROBE: ABNORMAL
NRBC # BLD: 0 /100 WBCS — SIGNIFICANT CHANGE UP (ref 0–0)
PHOSPHATE SERPL-MCNC: 2.3 MG/DL — SIGNIFICANT CHANGE UP (ref 2.1–4.9)
PLATELET # BLD AUTO: 176 K/UL — SIGNIFICANT CHANGE UP (ref 130–400)
PMV BLD: 12.5 FL — HIGH (ref 7.4–10.4)
POTASSIUM SERPL-MCNC: 3.7 MMOL/L — SIGNIFICANT CHANGE UP (ref 3.5–5)
POTASSIUM SERPL-SCNC: 3.7 MMOL/L — SIGNIFICANT CHANGE UP (ref 3.5–5)
PROT SERPL-MCNC: 4.9 G/DL — LOW (ref 6–8)
RBC # BLD: 2.66 M/UL — LOW (ref 4.2–5.4)
RBC # FLD: 13.2 % — SIGNIFICANT CHANGE UP (ref 11.5–14.5)
SODIUM SERPL-SCNC: 143 MMOL/L — SIGNIFICANT CHANGE UP (ref 135–146)
WBC # BLD: 7.23 K/UL — SIGNIFICANT CHANGE UP (ref 4.8–10.8)
WBC # FLD AUTO: 7.23 K/UL — SIGNIFICANT CHANGE UP (ref 4.8–10.8)

## 2024-11-24 PROCEDURE — 99233 SBSQ HOSP IP/OBS HIGH 50: CPT

## 2024-11-24 PROCEDURE — 71045 X-RAY EXAM CHEST 1 VIEW: CPT | Mod: 26

## 2024-11-24 PROCEDURE — 99232 SBSQ HOSP IP/OBS MODERATE 35: CPT

## 2024-11-24 RX ADMIN — LOSARTAN POTASSIUM 50 MILLIGRAM(S): 100 TABLET, FILM COATED ORAL at 05:30

## 2024-11-24 RX ADMIN — ENOXAPARIN SODIUM 90 MILLIGRAM(S): 30 INJECTION SUBCUTANEOUS at 21:26

## 2024-11-24 RX ADMIN — ENOXAPARIN SODIUM 90 MILLIGRAM(S): 30 INJECTION SUBCUTANEOUS at 09:39

## 2024-11-24 RX ADMIN — CHLORHEXIDINE GLUCONATE 15 MILLILITER(S): 1.2 RINSE ORAL at 17:02

## 2024-11-24 RX ADMIN — NYSTATIN 1 APPLICATION(S): 100000 POWDER TOPICAL at 17:03

## 2024-11-24 RX ADMIN — Medication 1 PACKET(S): at 05:29

## 2024-11-24 RX ADMIN — Medication 2 PUFF(S): at 15:01

## 2024-11-24 RX ADMIN — Medication 1 APPLICATION(S): at 05:29

## 2024-11-24 RX ADMIN — Medication 25 GRAM(S): at 15:56

## 2024-11-24 RX ADMIN — AMLODIPINE BESYLATE 10 MILLIGRAM(S): 10 TABLET ORAL at 05:30

## 2024-11-24 RX ADMIN — Medication 1 PACKET(S): at 21:27

## 2024-11-24 RX ADMIN — Medication 2 PUFF(S): at 08:04

## 2024-11-24 RX ADMIN — CHLORHEXIDINE GLUCONATE 15 MILLILITER(S): 1.2 RINSE ORAL at 05:29

## 2024-11-24 RX ADMIN — NYSTATIN 1 APPLICATION(S): 100000 POWDER TOPICAL at 05:40

## 2024-11-24 RX ADMIN — PANTOPRAZOLE SODIUM 40 MILLIGRAM(S): 40 TABLET, DELAYED RELEASE ORAL at 17:02

## 2024-11-24 RX ADMIN — Medication 1 APPLICATION(S): at 17:03

## 2024-11-24 RX ADMIN — Medication 1 PACKET(S): at 13:05

## 2024-11-24 RX ADMIN — PANTOPRAZOLE SODIUM 40 MILLIGRAM(S): 40 TABLET, DELAYED RELEASE ORAL at 05:29

## 2024-11-24 RX ADMIN — CHLORHEXIDINE GLUCONATE 1 APPLICATION(S): 1.2 RINSE ORAL at 05:31

## 2024-11-24 RX ADMIN — Medication 2 PUFF(S): at 20:17

## 2024-11-24 NOTE — PROGRESS NOTE ADULT - SUBJECTIVE AND OBJECTIVE BOX
Patient is a 73y old  Female who presents with a chief complaint of septic shock (24 Nov 2024 14:23)      INTERVAL HPI/OVERNIGHT EVENTS:    acetaminophen     Tablet .. 650 milliGRAM(s) Oral every 6 hours PRN  albuterol    90 MICROgram(s) HFA Inhaler 2 Puff(s) Inhalation every 6 hours PRN  albuterol/ipratropium for Nebulization 3 milliLiter(s) Nebulizer every 6 hours  amLODIPine   Tablet 10 milliGRAM(s) Oral daily  chlorhexidine 0.12% Liquid 15 milliLiter(s) Oral Mucosa every 12 hours  chlorhexidine 2% Cloths 1 Application(s) Topical <User Schedule>  enoxaparin Injectable 90 milliGRAM(s) SubCutaneous every 12 hours  HYDROmorphone  Injectable 0.5 milliGRAM(s) IV Push every 4 hours PRN  ipratropium 17 MICROgram(s) HFA Inhaler 2 Puff(s) Inhalation every 6 hours  losartan 50 milliGRAM(s) Oral daily  nystatin Cream 1 Application(s) Topical two times a day  pantoprazole   Suspension 40 milliGRAM(s) Oral two times a day  potassium phosphate / sodium phosphate Powder (PHOS-NaK) 1 Packet(s) Oral three times a day  vitamin A & D Ointment 1 Application(s) Topical two times a day      REVIEW OF SYSTEMS:  unable to perform      T(C): 36.7 (11-24-24 @ 05:00), Max: 36.9 (11-23-24 @ 20:00)  HR: 70 (11-24-24 @ 08:15) (67 - 78)  BP: 138/70 (11-24-24 @ 05:00) (138/70 - 150/78)  RR: 18 (11-24-24 @ 05:00) (18 - 18)  SpO2: 98% (11-24-24 @ 15:05) (97% - 100%)  Wt(kg): --Vital Signs Last 24 Hrs  T(C): 36.7 (24 Nov 2024 05:00), Max: 36.9 (23 Nov 2024 20:00)  T(F): 98 (24 Nov 2024 05:00), Max: 98.4 (23 Nov 2024 20:00)  HR: 70 (24 Nov 2024 08:15) (67 - 78)  BP: 138/70 (24 Nov 2024 05:00) (138/70 - 150/78)  BP(mean): --  RR: 18 (24 Nov 2024 05:00) (18 - 18)  SpO2: 98% (24 Nov 2024 15:05) (97% - 100%)    Parameters below as of 23 Nov 2024 20:00  Patient On (Oxygen Delivery Method): ventilator        PHYSICAL EXAM:  General: NAD, sleepy, patient is laying comfortably in bed  HEENT: AT, NC, Supple, trach present   Lungs: good breath sounds B/L, no wheezing, no rhonchi  CVS: normal S1, S2, RRR, NO M/G/R  Abdomen: soft, bowel sounds present, non-tender, non-distended, peg present  Extremities: no edema, no clubbing, no cyanosis, positive peripheral pulses b/l  Neuro: bed-confinement status  Skin: no rash, no   Consultant(s) Notes Reviewed:  [x ] YES  [ ] NO  Care Discussed with Consultants/Other Providers [ x] YES  [ ] NO    LABS:                        7.8    7.23  )-----------( 176      ( 24 Nov 2024 06:28 )             25.4     11-24    143  |  113[H]  |  13  ----------------------------<  92  3.7   |  24  |  0.6[L]    Ca    9.1      24 Nov 2024 06:28  Phos  2.3     11-24  Mg     1.6     11-24    TPro  4.9[L]  /  Alb  2.7[L]  /  TBili  0.4  /  DBili  x   /  AST  10  /  ALT  <5  /  AlkPhos  88  11-24      Urinalysis Basic - ( 24 Nov 2024 06:28 )    Color: x / Appearance: x / SG: x / pH: x  Gluc: 92 mg/dL / Ketone: x  / Bili: x / Urobili: x   Blood: x / Protein: x / Nitrite: x   Leuk Esterase: x / RBC: x / WBC x   Sq Epi: x / Non Sq Epi: x / Bacteria: x      CAPILLARY BLOOD GLUCOSE      POCT Blood Glucose.: 125 mg/dL (24 Nov 2024 17:03)  POCT Blood Glucose.: 98 mg/dL (24 Nov 2024 12:07)  POCT Blood Glucose.: 104 mg/dL (24 Nov 2024 06:27)  POCT Blood Glucose.: 113 mg/dL (24 Nov 2024 00:03)        Urinalysis Basic - ( 24 Nov 2024 06:28 )    Color: x / Appearance: x / SG: x / pH: x  Gluc: 92 mg/dL / Ketone: x  / Bili: x / Urobili: x   Blood: x / Protein: x / Nitrite: x   Leuk Esterase: x / RBC: x / WBC x   Sq Epi: x / Non Sq Epi: x / Bacteria: x          RADIOLOGY & ADDITIONAL TESTS:    Imaging Personally Reviewed:  [ ] YES  [ ] NO

## 2024-11-24 NOTE — PROGRESS NOTE ADULT - SUBJECTIVE AND OBJECTIVE BOX
Patient is a 73y old  Female who presents with a chief complaint of Acute hypoxic respiratory failure (23 Nov 2024 11:17)      Over Night Events:  Patient seen and examined.   on vent  cxr reviewed     ROS:  See HPI    PHYSICAL EXAM    ICU Vital Signs Last 24 Hrs  T(C): 36.7 (24 Nov 2024 05:00), Max: 37.2 (23 Nov 2024 14:15)  T(F): 98 (24 Nov 2024 05:00), Max: 98.9 (23 Nov 2024 14:15)  HR: 75 (24 Nov 2024 05:00) (67 - 80)  BP: 138/70 (24 Nov 2024 05:00) (106/63 - 150/78)  BP(mean): --  ABP: --  ABP(mean): --  RR: 18 (24 Nov 2024 05:00) (8 - 18)  SpO2: 100% (24 Nov 2024 05:00) (98% - 100%)    O2 Parameters below as of 23 Nov 2024 20:00  Patient On (Oxygen Delivery Method): ventilator            General: no distress  HEENT:      trach           Lymph Nodes: NO cervical LN   Lungs: Bilateral BS  Cardiovascular: Regular   Abdomen: Soft, Positive BS  Extremities: No clubbing   Skin: warm   Neurological:   Musculoskeletal: move all ext     I&O's Detail    23 Nov 2024 07:01  -  24 Nov 2024 07:00  --------------------------------------------------------  IN:    Enteral Tube Flush: 460 mL    Jevity 1.2: 1575 mL  Total IN: 2035 mL    OUT:    Voided (mL): 850 mL  Total OUT: 850 mL    Total NET: 1185 mL          LABS:                          7.8    7.23  )-----------( 176      ( 24 Nov 2024 06:28 )             25.4         24 Nov 2024 06:28    143    |  113    |  13     ----------------------------<  92     3.7     |  24     |  0.6      Ca    9.1        24 Nov 2024 06:28  Phos  2.3       24 Nov 2024 06:28  Mg     1.6       24 Nov 2024 06:28    TPro  4.9    /  Alb  2.7    /  TBili  0.4    /  DBili  x      /  AST  10     /  ALT  <5     /  AlkPhos  88     24 Nov 2024 06:28  Amylase x     lipase x                                                                                        Urinalysis Basic - ( 24 Nov 2024 06:28 )    Color: x / Appearance: x / SG: x / pH: x  Gluc: 92 mg/dL / Ketone: x  / Bili: x / Urobili: x   Blood: x / Protein: x / Nitrite: x   Leuk Esterase: x / RBC: x / WBC x   Sq Epi: x / Non Sq Epi: x / Bacteria: x                                                                                                             Mode: AC/ CMV (Assist Control/ Continuous Mandatory Ventilation)  RR (machine): 14  TV (machine): 360  FiO2: 40  PEEP: 8  ITime: 1  MAP: 12  PIP: 18                                          MEDICATIONS  (STANDING):  albuterol/ipratropium for Nebulization 3 milliLiter(s) Nebulizer every 6 hours  amLODIPine   Tablet 10 milliGRAM(s) Oral daily  chlorhexidine 0.12% Liquid 15 milliLiter(s) Oral Mucosa every 12 hours  chlorhexidine 2% Cloths 1 Application(s) Topical <User Schedule>  enoxaparin Injectable 90 milliGRAM(s) SubCutaneous every 12 hours  ipratropium 17 MICROgram(s) HFA Inhaler 2 Puff(s) Inhalation every 6 hours  losartan 50 milliGRAM(s) Oral daily  nystatin Cream 1 Application(s) Topical two times a day  pantoprazole   Suspension 40 milliGRAM(s) Oral two times a day  potassium chloride  20 mEq/100 mL IVPB 20 milliEquivalent(s) IV Intermittent once  potassium phosphate / sodium phosphate Powder (PHOS-NaK) 1 Packet(s) Oral three times a day  vitamin A & D Ointment 1 Application(s) Topical two times a day    MEDICATIONS  (PRN):  acetaminophen     Tablet .. 650 milliGRAM(s) Oral every 6 hours PRN Temp greater or equal to 38C (100.4F)  albuterol    90 MICROgram(s) HFA Inhaler 2 Puff(s) Inhalation every 6 hours PRN Shortness of Breath and/or Wheezing  HYDROmorphone  Injectable 0.5 milliGRAM(s) IV Push every 4 hours PRN Severe Pain (7 - 10)          Xrays:   reviewed by me mild decrease LL opacity rotated                                                                             ECHO:  [facility] ICU:

## 2024-11-24 NOTE — PROGRESS NOTE ADULT - SUBJECTIVE AND OBJECTIVE BOX
PATRICIA SPRAGUE  [facility]-N 3A 024 A (SI-N 3A)      Patient was evaluated and examined  by bedside, still has loose bm's      REVIEW OF SYSTEMS: unable to obtain, patient is a poor historian       T(C): , Max: 36.9 (11-23-24 @ 20:00)  HR: 70 (11-24-24 @ 08:15)  BP: 138/70 (11-24-24 @ 05:00)  RR: 18 (11-24-24 @ 05:00)  SpO2: 97% (11-24-24 @ 08:15)  CAPILLARY BLOOD GLUCOSE      POCT Blood Glucose.: 98 mg/dL (24 Nov 2024 12:07)  POCT Blood Glucose.: 104 mg/dL (24 Nov 2024 06:27)  POCT Blood Glucose.: 113 mg/dL (24 Nov 2024 00:03)  POCT Blood Glucose.: 121 mg/dL (23 Nov 2024 17:48)      PHYSICAL EXAM:  General: NAD, sleepy, patient is laying comfortably in bed  HEENT: AT, NC, Supple, trach present   Lungs: good breath sounds B/L, no wheezing, no rhonchi  CVS: normal S1, S2, RRR, NO M/G/R  Abdomen: soft, bowel sounds present, non-tender, non-distended, peg present  Extremities: no edema, no clubbing, no cyanosis, positive peripheral pulses b/l  Neuro: bed-confinement status  Skin: no rash, no ecchymosis      LAB  CBC  Date: 11-24-24 @ 06:28  Mean cell Vewlsffugc86.3  Mean cell Hemoglobin Conc30.7  Mean cell Volum 95.5  Platelet count-Automate 176  RBC Count 2.66  Red Cell Distrib Width13.2  WBC Count7.23  % Albumin, Urine--  Hematocrit 25.4  Hemoglobin 7.8  CBC  Date: 11-23-24 @ 08:40  Mean cell Pxfduuwjgj50.9  Mean cell Hemoglobin Conc31.0  Mean cell Volum 96.2  Platelet count-Automate 247  RBC Count 2.88  Red Cell Distrib Width13.0  WBC Count9.00  % Albumin, Urine--  Hematocrit 27.7  Hemoglobin 8.6  CBC  Date: 11-22-24 @ 08:41  Mean cell Mnalccfwii41.3  Mean cell Hemoglobin Conc30.5  Mean cell Volum 96.2  Platelet count-Automate 135  RBC Count 2.66  Red Cell Distrib Width13.3  WBC Count8.04  % Albumin, Urine--  Hematocrit 25.6  Hemoglobin 7.8  CBC  Date: 11-21-24 @ 05:46  Mean cell Ouovnvobob58.7  Mean cell Hemoglobin Conc31.5  Mean cell Volum 94.3  Platelet count-Automate 261  RBC Count 2.63  Red Cell Distrib Width13.1  WBC Count7.93  % Albumin, Urine--  Hematocrit 24.8  Hemoglobin 7.8  CBC  Date: 11-20-24 @ 04:32  Mean cell Zggtkxdvhq07.7  Mean cell Hemoglobin Conc31.0  Mean cell Volum 96.1  Platelet count-Automate 263  RBC Count 2.79  Red Cell Distrib Width13.2  WBC Count8.90  % Albumin, Urine--  Hematocrit 26.8  Hemoglobin 8.3  CBC  Date: 11-19-24 @ 06:10  Mean cell Rdqhvaxdhl94.0  Mean cell Hemoglobin Conc31.1  Mean cell Volum 96.4  Platelet count-Automate 250  RBC Count 2.77  Red Cell Distrib Width13.2  WBC Count10.29  % Albumin, Urine--  Hematocrit 26.7  Hemoglobin 8.3  CBC  Date: 11-18-24 @ 22:45  Mean cell Puvqmqmdce95.7  Mean cell Hemoglobin Conc30.9  Mean cell Volum 96.2  Platelet count-Automate 284  RBC Count 2.93  Red Cell Distrib Width13.1  WBC Count11.35  % Albumin, Urine--  Hematocrit 28.2  Hemoglobin 8.7  CBC  Date: 11-18-24 @ 04:39  Mean cell Ihgzyojisc74.2  Mean cell Hemoglobin Conc30.8  Mean cell Volum 94.9  Platelet count-Automate 200  RBC Count 2.74  Red Cell Distrib Width12.5  WBC Count8.57  % Albumin, Urine--  Hematocrit 26.0  Hemoglobin 8.0    [facility]  11-24-24 @ 06:28  Blood Gas Arterial-Calcium,Ionized--  Blood Urea Nitrogen, Serum 13 mg/dL [10 - 20]  Carbon Dioxide, Serum24 mmol/L [17 - 32]  Chloride, Dtedc456 mmol/L[H] [98 - 110]  Creatinie, Serum0.6 mg/dL[L] [0.7 - 1.5]  Glucose, Serum92 mg/dL [70 - 99]  Potassium, Serum3.7 mmol/L [3.5 - 5.0]  Sodium, Serum 143 mmol/L [135 - 146]  [facility]  11-23-24 @ 08:40  Blood Gas Arterial-Calcium,Ionized--  Blood Urea Nitrogen, Serum 11 mg/dL [10 - 20]  Carbon Dioxide, Serum24 mmol/L [17 - 32]  Chloride, Hnztx195 mmol/L[H] [98 - 110]  Creatinie, Serum0.7 mg/dL [0.7 - 1.5]  Glucose, Serum85 mg/dL [70 - 99]  Potassium, Serum3.7 mmol/L [3.5 - 5.0]  Sodium, Serum 147 mmol/L[H] [135 - 146]  [facility]  11-23-24 @ 00:35  Blood Gas Arterial-Calcium,Ionized--  Blood Urea Nitrogen, Serum 10 mg/dL [10 - 20]  Carbon Dioxide, Serum23 mmol/L [17 - 32]  Chloride, Sgcpz882 mmol/L[H] [98 - 110]  Creatinie, Serum0.6 mg/dL[L] [0.7 - 1.5]  Glucose, Serum90 mg/dL [70 - 99]  Potassium, Serum3.7 mmol/L [3.5 - 5.0]  Sodium, Serum 144 mmol/L [135 - 146]  [facility]  11-22-24 @ 18:10  Blood Gas Arterial-Calcium,Ionized--  Blood Urea Nitrogen, Serum 12 mg/dL [10 - 20]  Carbon Dioxide, Serum23 mmol/L [17 - 32]  Chloride, Corvz585 mmol/L[H] [98 - 110]  Creatinie, Serum0.6 mg/dL[L] [0.7 - 1.5]  Glucose, Vvuti569 mg/dL[H] [70 - 99]  Potassium, Serum3.2 mmol/L[L] [3.5 - 5.0]  Sodium, Serum 144 mmol/L [135 - 146]  [facility]  11-22-24 @ 08:41  Blood Gas Arterial-Calcium,Ionized--  Blood Urea Nitrogen, Serum 12 mg/dL [10 - 20]  Carbon Dioxide, Serum20 mmol/L [17 - 32]  Chloride, Qdybm487 mmol/L[H] [98 - 110]  Creatinie, Serum0.7 mg/dL [0.7 - 1.5]  Glucose, Vuvsg244 mg/dL[H] [70 - 99]  Potassium, Serum3.5 mmol/L [3.5 - 5.0] [Slighty Hemolyzed use with Caution]  Sodium, Serum 147 mmol/L[H] [135 - 146]  BMP  11-21-24 @ 20:00  Blood Gas Arterial-Calcium,Ionized--  Blood Urea Nitrogen, Serum 11 mg/dL [10 - 20]  Carbon Dioxide, Serum25 mmol/L [17 - 32]  Chloride, Divne220 mmol/L[H] [98 - 110]  Creatinie, Serum0.7 mg/dL [0.7 - 1.5]  Glucose, Serum92 mg/dL [70 - 99]  Potassium, Serum3.0 mmol/L[L] [3.5 - 5.0]  Sodium, Serum 149 mmol/L[H] [135 - 146]  [facility]  11-21-24 @ 05:46  Blood Gas Arterial-Calcium,Ionized--  Blood Urea Nitrogen, Serum 11 mg/dL [10 - 20]  Carbon Dioxide, Serum26 mmol/L [17 - 32]  Chloride, Hahwb946 mmol/L[H] [98 - 110]  Creatinie, Serum0.7 mg/dL [0.7 - 1.5]  Glucose, Dzfxb517 mg/dL[H] [70 - 99]  Potassium, Serum3.4 mmol/L[L] [3.5 - 5.0]  Sodium, Serum 148 mmol/L[H] [135 - 146]  [facility]  11-20-24 @ 04:32  Blood Gas Arterial-Calcium,Ionized--  Blood Urea Nitrogen, Serum 11 mg/dL [10 - 20]  Carbon Dioxide, Serum26 mmol/L [17 - 32]  Chloride, Ogesg801 mmol/L[H] [98 - 110]  Creatinie, Serum0.8 mg/dL [0.7 - 1.5]  Glucose, Serum81 mg/dL [70 - 99]  Potassium, Serum3.2 mmol/L[L] [3.5 - 5.0]  Sodium, Serum 147 mmol/L[H] [135 - 146]  [facility]  11-20-24 @ 03:33  Blood Gas Arterial-Calcium,Ionized1.46 mmol/L[H] [1.15 - 1.33]  Blood Urea Nitrogen, Serum --  Carbon Dioxide, Serum--  Chloride, Serum--  Creatinie, Serum--  Glucose, Serum--  Potassium, Serum--  Sodium, Serum --              Microbiology:    Culture - Fungal, Bronchial (collected 11-20-24 @ 12:00)  Source: Bronchial  Preliminary Report (11-24-24 @ 07:49):    No growth    Culture - Acid Fast - Bronchial w/Smear (collected 11-20-24 @ 12:00)  Source: Bronchial  Preliminary Report (11-23-24 @ 23:06):    Culture is being performed.    Culture - Bronchial (collected 11-20-24 @ 12:00)  Source: Bronchial  Gram Stain (11-20-24 @ 23:18):    Moderate polymorphonuclear leukocytes per low power field    No squamous epithelial cells    Few Gram Positive Cocci in Clusters per oil power field  Final Report (11-22-24 @ 09:09):    Commensal rosalva consistent with body site    Culture - Fungal, Bronchial (collected 11-19-24 @ 11:25)  Source: Bronchial  Preliminary Report (11-22-24 @ 09:10):    No growth    Culture - Acid Fast - Bronchial w/Smear (collected 11-19-24 @ 11:25)  Source: Bronchial  Preliminary Report (11-20-24 @ 23:07):    Culture is being performed.    Culture - Bronchial (collected 11-19-24 @ 11:25)  Source: Bronch Wash  Gram Stain (11-19-24 @ 23:26):    Rare polymorphonuclear leukocytes per low power field    No Squamous epithelial cells per low power field    No organisms seen per oil power field  Final Report (11-21-24 @ 08:25):    Commensal rosalva consistent with body site    Culture - Viral, General (collected 11-12-24 @ 09:01)    Culture - Fungal, Bronchial (collected 11-12-24 @ 09:01)  Source: Bronchial  Preliminary Report (11-20-24 @ 23:02):    No fungus isolated at 1 week.    Culture - Acid Fast - Bronchial w/Smear (collected 11-12-24 @ 09:01)  Source: Bronch Wash  Preliminary Report (11-20-24 @ 23:06):    No acid-fast bacilli isolated at 1 week. ****Acid-fast cultures are held    for 6 weeks.****    Culture - Bronchial (collected 11-12-24 @ 09:01)  Source: Bronch Wash  Gram Stain (11-12-24 @ 20:33):    No Squamous epithelial cells seen per low power field    Rare polymorphonuclear leukocytes seen per low power field    No organisms seen per oil power field  Final Report (11-14-24 @ 09:38):    Commensal rosalva consistent with body site    Culture - Sputum (collected 11-08-24 @ 12:36)  Source: Trach Asp Tracheal Aspirate  Gram Stain (11-09-24 @ 07:34):    Few polymorphonuclear leukocytes per low power field    No Squamous epithelial cells per low power field    Rare Gram positive cocci in pairs seen per oil power field    Rare Gram Negative Rods seen per oil power field  Final Report (11-10-24 @ 09:45):    No growth    Urinalysis with Rflx Culture (collected 11-03-24 @ 13:51)    Culture - Stool (collected 11-03-24 @ 13:51)  Source: .Stool  Final Report (11-05-24 @ 19:02):    No enteric pathogens isolated.    (Stool culture examined for Salmonella,    Shigella, Campylobacter, Aeromonas, Plesiomonas,    Vibrio, E.coli O157 and Yersinia)    Culture - Urine (collected 11-03-24 @ 13:51)  Source: Clean Catch None  Final Report (11-05-24 @ 12:11):    >100,000 CFU/ml Streptococcus gallolyticus "Susceptibilities not    performed"    <10,000 CFU/ml Normal Urogenital rosalva present    Culture - Blood (collected 11-03-24 @ 11:10)  Source: .Blood BLOOD  Final Report (11-08-24 @ 18:00):    No growth at 5 days    Culture - Blood (collected 11-03-24 @ 11:10)  Source: .Blood BLOOD  Final Report (11-08-24 @ 18:00):    No growth at 5 days        Medications:  acetaminophen     Tablet .. 650 milliGRAM(s) Oral every 6 hours PRN  albuterol    90 MICROgram(s) HFA Inhaler 2 Puff(s) Inhalation every 6 hours PRN  albuterol/ipratropium for Nebulization 3 milliLiter(s) Nebulizer every 6 hours  amLODIPine   Tablet 10 milliGRAM(s) Oral daily  chlorhexidine 0.12% Liquid 15 milliLiter(s) Oral Mucosa every 12 hours  chlorhexidine 2% Cloths 1 Application(s) Topical <User Schedule>  enoxaparin Injectable 90 milliGRAM(s) SubCutaneous every 12 hours  HYDROmorphone  Injectable 0.5 milliGRAM(s) IV Push every 4 hours PRN  ipratropium 17 MICROgram(s) HFA Inhaler 2 Puff(s) Inhalation every 6 hours  losartan 50 milliGRAM(s) Oral daily  nystatin Cream 1 Application(s) Topical two times a day  pantoprazole   Suspension 40 milliGRAM(s) Oral two times a day  potassium chloride  20 mEq/100 mL IVPB 20 milliEquivalent(s) IV Intermittent once  potassium phosphate / sodium phosphate Powder (PHOS-NaK) 1 Packet(s) Oral three times a day  vitamin A & D Ointment 1 Application(s) Topical two times a day        Assessment and Plan:  73-year-old female PMH A-fib on xarelto, HTN, s/p cholecystectomy, congenital solitary kidney presents from NH to the ED for evaluation of weakness, decreased PO intake and abdominal distension.     Admitted to ICU for septic shock requiring pressors 2/2 ESBL proteus (UTI vs. GI source) complicated by gastric distension w/ pneumatosis, NOLA 2/2 ATN, hypernatremia. s/p NGT decompression (-3.1 L feculent output) in ED. Intubated 11/6 for AHRF. EGD 11/8 w/ multiple ulcers, largest 10cm, path +gastritis. Now with new R colon/cecal mass pending colonoscopy when more stable.   Extubated 11/13 to AVAPs, weaned to HFNC however transitioned back to BiPAP after desaturation. Patient was weak and unable to clear secretions despite non-invasive measures. Reintubated 11/18 PM, s/p trach/PEJ 11/19 with thoracic sx.    # Norovirus Diarrhea- GI PCR on 11/23- positive, rec. to resent sample, reordered GI PCR on 11/24, placed on contact isolation   - stool cdiff on 11/23 neg    #AHRF 2/2 septic shock due to ESBL proteus (UTI Vs. GI source) s/p intubation x2, s/p trach 11/19  #HAGMA 2/2 lactic acidosis and uremia - resolved   #NOLA likely ATN 2/2 septic shock - resolved   #Possible aspiration  #Congenital solitary kidney  - Intubated 11/6, extubated 11/13 to AVAPS, re-intubated 11/18, s/p trach 11/19  - UCx +ESBL proteus, BCX (-), trach culture with resp rosalva, sputum cx 11/12 resp rosalva  - off pressors, off sedation  - s/p abx (finished 11/13)  - c/w mechanical ventilation, SBT as tolerated   - pain management,  chest PT/MDI q6h  - post bronch on 11/20- neg , f/u bronch cx/gram stain (11/18-neg , 11/19- neg )      #Dysphagia 2/2 critical illness s/p PEJ  - PEJ w/ thoracic 11/19- tolerating feedings well      #R colon/cecal mass on CT A/P  #Multiple gastric ulcers iso chronic gastritis   #Abdominal distension w/ pneumatosis - resolved   #Diarrhea - recurrent on 11/23- obtain GI PCR  - CT A/P on admission with severe gastric distension with new pneumatosis along posterior gastric wall suspicious for ischemia, foci of free intraperitoneal air ; suspicious portal venous gas; thick bladder wall (cystitis vs colovesicular fistula)   - s/p EGD 11/8 with multiple ulcers, erosive gastritis:  EGD path: chronic gastritis (-) H pylori, no malignancy   - repeated CT A/P with PO contrast 11.8 w/ suspicious hepatic flexure narrowing and lobulated soft tissue density in R colon/cecum.   -  Cdiff (-)  -  PPI BID   - colonoscopy when more stable as per GI    #Hypernatremia- corrected . free water via peg, daily BMP  # Hypomagnesemia - supplemented        #Sacral DTI - offloading, wound care, pressure injury prevention    #Afib- resumed on therapeutic lovenox tx.    #HTN - amlodipine 10mg qd PO, losartan 50mg qd PO (home meds)    DVT ppx:  on lovenox tx.    Diet: NPO w/ PEJ feeding  GI ppx/Bowel regimen: PPI BID    Code status: full code    #Progress Note Handoff: pt. still with diarrhea reported,  electrolytes supplemented, repeat GI pcr, placed on contact isolation,   vent management as per Pulm. team  Family discussion: yes, medical team Disposition: SNF     Total time spent to complete patient's bedside assessment, review medical chart, discuss medical plan of care with covering medical team was more than 55 minutes with >50% of time spent face to face with patient, discussion with patient/family and/or coordination of care .

## 2024-11-24 NOTE — PROGRESS NOTE ADULT - ASSESSMENT
73-year-old female PMH A-fib on xarelto, HTN, s/p cholecystectomy, congenital solitary kidney presents from NH to the ED for evaluation of weakness, decreased PO intake and abdominal distension.     Admitted to ICU for septic shock requiring pressors 2/2 ESBL proteus (UTI vs. GI source) complicated by gastric distension w/ pneumatosis, NOLA 2/2 ATN, hypernatremia. s/p NGT decompression (-3.1 L feculent output) in ED. Intubated 11/6 for AHRF. EGD 11/8 w/ multiple ulcers, largest 10cm, path +gastritis. Now with new R colon/cecal mass pending colonoscopy when more stable.   Extubated 11/13 to AVAPs, weaned to HFNC however transitioned back to BiPAP after desaturation. Patient was weak and unable to clear secretions despite non-invasive measures. Reintubated 11/18 PM, s/p trach/PEJ 11/19 with thoracic sx.    # Norovirus Diarrhea- GI PCR on 11/23- positive, rec. to resent sample, reordered GI PCR on 11/24, placed on contact isolation   - stool cdiff on 11/23 neg    #AHRF 2/2 septic shock due to ESBL proteus (UTI Vs. GI source) s/p intubation x2, s/p trach 11/19  #HAGMA 2/2 lactic acidosis and uremia - resolved   #NOLA likely ATN 2/2 septic shock - resolved   #Possible aspiration  #Congenital solitary kidney  - [facility] re-int[facility]ated 11/18, s/p trach 11/19  - UCx +ESBL proteus, BCX (-), trach culture with resp rosalva, sputum cx 11/12 resp rosalva  - off pressors, off sedation  - s/p abx (finished 11/13)  - c/w mechanical ventilation, SBT as tolerated   - pain management,  chest PT/MDI q6h  - post bronch on 11/20- neg , f/u bronch cx/gram stain (11/18-neg , 11/19- neg )      #Dysphagia 2/2 critical illness s/p PEJ  - PEJ w/ thoracic 11/19- tolerating feedings well      #R colon/cecal mass on CT A/P  #Multiple gastric ulcers iso chronic gastritis   #Abdominal distension w/ pneumatosis - resolved   #Diarrhea - recurrent on 11/23- obtain GI PCR  - CT A/P on admission with severe gastric distension with new pneumatosis along posterior gastric wall suspicious for ischemia, foci of free intraperitoneal air ; suspicious portal venous gas; thick bladder wall (cystitis vs colovesicular fistula)   - s/p EGD 11/8 with multiple ulcers, erosive gastritis:  EGD path: chronic gastritis (-) H pylori, no malignancy   - repeated CT A/P with PO contrast 11.8 w/ suspicious hepatic flexure narrowing and lobulated soft tissue density in R colon/cecum.   -  Cdiff (-)  -  PPI BID   - colonoscopy when more stable as per GI    #Hypernatremia- corrected . free water via peg, daily BMP  # Hypomagnesemia - supplemented        #Sacral DTI - offloading, wound care, pressure injury prevention    #Afib- resumed on therapeutic lovenox tx.    #HTN - amlodipine 10mg qd PO, losartan 50mg qd PO (home meds)    DVT ppx:  on lovenox tx.    Diet: NPO w/ PEJ feeding  GI ppx/Bowel regimen: PPI BID    Code status: full code

## 2024-11-24 NOTE — OCCUPATIONAL THERAPY INITIAL EVALUATION ADULT - SPECIFY REASON(S)
Chart reviewed. Pt presently unavailable, soiled in bed receiving hygiene care. OT to f/u at later time if time permits

## 2024-11-25 LAB
ALBUMIN SERPL ELPH-MCNC: 2.8 G/DL — LOW (ref 3.5–5.2)
ALP SERPL-CCNC: 97 U/L — SIGNIFICANT CHANGE UP (ref 30–115)
ALT FLD-CCNC: 5 U/L — SIGNIFICANT CHANGE UP (ref 0–41)
ANION GAP SERPL CALC-SCNC: 12 MMOL/L — SIGNIFICANT CHANGE UP (ref 7–14)
AST SERPL-CCNC: 8 U/L — SIGNIFICANT CHANGE UP (ref 0–41)
BASOPHILS # BLD AUTO: 0.08 K/UL — SIGNIFICANT CHANGE UP (ref 0–0.2)
BASOPHILS NFR BLD AUTO: 1.1 % — HIGH (ref 0–1)
BILIRUB SERPL-MCNC: 0.4 MG/DL — SIGNIFICANT CHANGE UP (ref 0.2–1.2)
BUN SERPL-MCNC: 14 MG/DL — SIGNIFICANT CHANGE UP (ref 10–20)
CALCIUM SERPL-MCNC: 9.5 MG/DL — SIGNIFICANT CHANGE UP (ref 8.4–10.5)
CHLORIDE SERPL-SCNC: 111 MMOL/L — HIGH (ref 98–110)
CO2 SERPL-SCNC: 22 MMOL/L — SIGNIFICANT CHANGE UP (ref 17–32)
CREAT SERPL-MCNC: 0.7 MG/DL — SIGNIFICANT CHANGE UP (ref 0.7–1.5)
CULTURE RESULTS: SIGNIFICANT CHANGE UP
EGFR: 92 ML/MIN/1.73M2 — SIGNIFICANT CHANGE UP
EOSINOPHIL # BLD AUTO: 0.55 K/UL — SIGNIFICANT CHANGE UP (ref 0–0.7)
EOSINOPHIL NFR BLD AUTO: 7.7 % — SIGNIFICANT CHANGE UP (ref 0–8)
GI PCR PANEL: ABNORMAL
GLUCOSE BLDC GLUCOMTR-MCNC: 122 MG/DL — HIGH (ref 70–99)
GLUCOSE BLDC GLUCOMTR-MCNC: 124 MG/DL — HIGH (ref 70–99)
GLUCOSE BLDC GLUCOMTR-MCNC: 87 MG/DL — SIGNIFICANT CHANGE UP (ref 70–99)
GLUCOSE SERPL-MCNC: 97 MG/DL — SIGNIFICANT CHANGE UP (ref 70–99)
HCT VFR BLD CALC: 25.7 % — LOW (ref 37–47)
HGB BLD-MCNC: 8 G/DL — LOW (ref 12–16)
IMM GRANULOCYTES NFR BLD AUTO: 1 % — HIGH (ref 0.1–0.3)
LYMPHOCYTES # BLD AUTO: 1.85 K/UL — SIGNIFICANT CHANGE UP (ref 1.2–3.4)
LYMPHOCYTES # BLD AUTO: 25.9 % — SIGNIFICANT CHANGE UP (ref 20.5–51.1)
MAGNESIUM SERPL-MCNC: 1.9 MG/DL — SIGNIFICANT CHANGE UP (ref 1.8–2.4)
MCHC RBC-ENTMCNC: 29.5 PG — SIGNIFICANT CHANGE UP (ref 27–31)
MCHC RBC-ENTMCNC: 31.1 G/DL — LOW (ref 32–37)
MCV RBC AUTO: 94.8 FL — SIGNIFICANT CHANGE UP (ref 81–99)
MONOCYTES # BLD AUTO: 0.77 K/UL — HIGH (ref 0.1–0.6)
MONOCYTES NFR BLD AUTO: 10.8 % — HIGH (ref 1.7–9.3)
NEUTROPHILS # BLD AUTO: 3.82 K/UL — SIGNIFICANT CHANGE UP (ref 1.4–6.5)
NEUTROPHILS NFR BLD AUTO: 53.5 % — SIGNIFICANT CHANGE UP (ref 42.2–75.2)
NOROVIRUS GI+II RNA STL QL NAA+NON-PROBE: ABNORMAL
NRBC # BLD: 0 /100 WBCS — SIGNIFICANT CHANGE UP (ref 0–0)
PHOSPHATE SERPL-MCNC: 2.9 MG/DL — SIGNIFICANT CHANGE UP (ref 2.1–4.9)
PLATELET # BLD AUTO: 155 K/UL — SIGNIFICANT CHANGE UP (ref 130–400)
PMV BLD: 12 FL — HIGH (ref 7.4–10.4)
POTASSIUM SERPL-MCNC: 3.1 MMOL/L — LOW (ref 3.5–5)
POTASSIUM SERPL-SCNC: 3.1 MMOL/L — LOW (ref 3.5–5)
PROT SERPL-MCNC: 5 G/DL — LOW (ref 6–8)
RBC # BLD: 2.71 M/UL — LOW (ref 4.2–5.4)
RBC # FLD: 13.2 % — SIGNIFICANT CHANGE UP (ref 11.5–14.5)
SODIUM SERPL-SCNC: 145 MMOL/L — SIGNIFICANT CHANGE UP (ref 135–146)
SPECIMEN SOURCE: SIGNIFICANT CHANGE UP
WBC # BLD: 7.14 K/UL — SIGNIFICANT CHANGE UP (ref 4.8–10.8)
WBC # FLD AUTO: 7.14 K/UL — SIGNIFICANT CHANGE UP (ref 4.8–10.8)

## 2024-11-25 PROCEDURE — 99233 SBSQ HOSP IP/OBS HIGH 50: CPT

## 2024-11-25 PROCEDURE — 99232 SBSQ HOSP IP/OBS MODERATE 35: CPT

## 2024-11-25 PROCEDURE — 71045 X-RAY EXAM CHEST 1 VIEW: CPT | Mod: 26

## 2024-11-25 RX ORDER — POTASSIUM CHLORIDE 600 MG/1
40 TABLET, EXTENDED RELEASE ORAL ONCE
Refills: 0 | Status: COMPLETED | OUTPATIENT
Start: 2024-11-25 | End: 2024-11-25

## 2024-11-25 RX ORDER — POTASSIUM CHLORIDE 600 MG/1
20 TABLET, EXTENDED RELEASE ORAL
Refills: 0 | Status: DISCONTINUED | OUTPATIENT
Start: 2024-11-25 | End: 2024-11-25

## 2024-11-25 RX ORDER — POTASSIUM CHLORIDE 600 MG/1
20 TABLET, EXTENDED RELEASE ORAL
Refills: 0 | Status: COMPLETED | OUTPATIENT
Start: 2024-11-25 | End: 2024-11-25

## 2024-11-25 RX ADMIN — CHLORHEXIDINE GLUCONATE 15 MILLILITER(S): 1.2 RINSE ORAL at 18:53

## 2024-11-25 RX ADMIN — ENOXAPARIN SODIUM 90 MILLIGRAM(S): 30 INJECTION SUBCUTANEOUS at 21:37

## 2024-11-25 RX ADMIN — Medication 1 APPLICATION(S): at 18:54

## 2024-11-25 RX ADMIN — NYSTATIN 1 APPLICATION(S): 100000 POWDER TOPICAL at 18:53

## 2024-11-25 RX ADMIN — PANTOPRAZOLE SODIUM 40 MILLIGRAM(S): 40 TABLET, DELAYED RELEASE ORAL at 05:49

## 2024-11-25 RX ADMIN — LOSARTAN POTASSIUM 50 MILLIGRAM(S): 100 TABLET, FILM COATED ORAL at 05:49

## 2024-11-25 RX ADMIN — Medication 2 PUFF(S): at 20:15

## 2024-11-25 RX ADMIN — Medication 2 PUFF(S): at 08:25

## 2024-11-25 RX ADMIN — NYSTATIN 1 APPLICATION(S): 100000 POWDER TOPICAL at 05:51

## 2024-11-25 RX ADMIN — POTASSIUM CHLORIDE 40 MILLIEQUIVALENT(S): 600 TABLET, EXTENDED RELEASE ORAL at 10:08

## 2024-11-25 RX ADMIN — CHLORHEXIDINE GLUCONATE 1 APPLICATION(S): 1.2 RINSE ORAL at 05:50

## 2024-11-25 RX ADMIN — PANTOPRAZOLE SODIUM 40 MILLIGRAM(S): 40 TABLET, DELAYED RELEASE ORAL at 18:54

## 2024-11-25 RX ADMIN — ENOXAPARIN SODIUM 90 MILLIGRAM(S): 30 INJECTION SUBCUTANEOUS at 10:05

## 2024-11-25 RX ADMIN — AMLODIPINE BESYLATE 10 MILLIGRAM(S): 10 TABLET ORAL at 05:49

## 2024-11-25 RX ADMIN — Medication 2 PUFF(S): at 13:41

## 2024-11-25 NOTE — PROGRESS NOTE ADULT - SUBJECTIVE AND OBJECTIVE BOX
Over Night Events: events noted, vent dependant, afebrile    PHYSICAL EXAM    ICU Vital Signs Last 24 Hrs  T(C): 36.8 (25 Nov 2024 04:59), Max: 36.8 (25 Nov 2024 04:59)  T(F): 98.2 (25 Nov 2024 04:59), Max: 98.2 (25 Nov 2024 04:59)  HR: 58 (25 Nov 2024 04:59) (58 - 70)  BP: 144/62 (25 Nov 2024 04:59) (138/76 - 144/62)  RR: 18 (25 Nov 2024 04:59) (18 - 18)  SpO2: 100% (25 Nov 2024 04:59) (97% - 100%)    O2 Parameters below as of 24 Nov 2024 20:24  Patient On (Oxygen Delivery Method): ventilator             General: ill looking  trac  Lungs: dec bs l base  Cardiovascular: EARLENE 2.6  Abdomen: Soft, Positive BS  Follows commands      11-23-24 @ 07:01  -  11-24-24 @ 07:00  --------------------------------------------------------  IN:    Enteral Tube Flush: 460 mL    Jevity 1.2: 1575 mL  Total IN: 2035 mL    OUT:    Voided (mL): 850 mL  Total OUT: 850 mL    Total NET: 1185 mL      11-24-24 @ 07:01  -  11-25-24 @ 05:59  --------------------------------------------------------  IN:    Enteral Tube Flush: 490 mL    Jevity 1.2: 1050 mL  Total IN: 1540 mL    OUT:    Rectal Tube (mL): 500 mL  Total OUT: 500 mL    Total NET: 1040 mL          LABS:                          7.8    7.23  )-----------( 176      ( 24 Nov 2024 06:28 )             25.4                                               11-24    143  |  113[H]  |  13  ----------------------------<  92  3.7   |  24  |  0.6[L]    Ca    9.1      24 Nov 2024 06:28  Phos  2.3     11-24  Mg     1.6     11-24    TPro  4.9[L]  /  Alb  2.7[L]  /  TBili  0.4  /  DBili  x   /  AST  10  /  ALT  <5  /  AlkPhos  88  11-24                                             Urinalysis Basic - ( 24 Nov 2024 06:28 )    Color: x / Appearance: x / SG: x / pH: x  Gluc: 92 mg/dL / Ketone: x  / Bili: x / Urobili: x   Blood: x / Protein: x / Nitrite: x   Leuk Esterase: x / RBC: x / WBC x   Sq Epi: x / Non Sq Epi: x / Bacteria: x                                                  LIVER FUNCTIONS - ( 24 Nov 2024 06:28 )  Alb: 2.7 g/dL / Pro: 4.9 g/dL / ALK PHOS: 88 U/L / ALT: <5 U/L / AST: 10 U/L / GGT: x                                                  Culture - Stool (collected 23 Nov 2024 14:11)  Source: .Stool  Preliminary Report (24 Nov 2024 19:32):    No enteric pathogens to date: Final culture pending                                                   Mode: AC/ CMV (Assist Control/ Continuous Mandatory Ventilation)  RR (machine): 14  TV (machine): 360  FiO2: 40  PEEP: 8  MAP: 14  PIP: 20                                          MEDICATIONS  (STANDING):  albuterol/ipratropium for Nebulization 3 milliLiter(s) Nebulizer every 6 hours  amLODIPine   Tablet 10 milliGRAM(s) Oral daily  chlorhexidine 0.12% Liquid 15 milliLiter(s) Oral Mucosa every 12 hours  chlorhexidine 2% Cloths 1 Application(s) Topical <User Schedule>  enoxaparin Injectable 90 milliGRAM(s) SubCutaneous every 12 hours  ipratropium 17 MICROgram(s) HFA Inhaler 2 Puff(s) Inhalation every 6 hours  losartan 50 milliGRAM(s) Oral daily  nystatin Cream 1 Application(s) Topical two times a day  pantoprazole   Suspension 40 milliGRAM(s) Oral two times a day  vitamin A & D Ointment 1 Application(s) Topical two times a day    MEDICATIONS  (PRN):  acetaminophen     Tablet .. 650 milliGRAM(s) Oral every 6 hours PRN Temp greater or equal to 38C (100.4F)  albuterol    90 MICROgram(s) HFA Inhaler 2 Puff(s) Inhalation every 6 hours PRN Shortness of Breath and/or Wheezing  HYDROmorphone  Injectable 0.5 milliGRAM(s) IV Push every 4 hours PRN Severe Pain (7 - 10)      cxr noted

## 2024-11-25 NOTE — PROGRESS NOTE ADULT - SUBJECTIVE AND OBJECTIVE BOX
SUBJECTIVE/OVERNIGHT EVENTS  Today is hospital day 22d. This morning patient was seen and examined at bedside, resting comfortably in bed. No acute or major events overnight.      CODE STATUS:      PMH:  HTN (hypertension)    Diabetes mellitus    Obesity    Gastroesophageal reflux disease    Active asthma    Generalized OA    Afib          PSH:  H/O hernia repair    History of cholecystectomy          MEDICATIONS  STANDING MEDICATIONS  albuterol/ipratropium for Nebulization 3 milliLiter(s) Nebulizer every 6 hours  amLODIPine   Tablet 10 milliGRAM(s) Oral daily  chlorhexidine 0.12% Liquid 15 milliLiter(s) Oral Mucosa every 12 hours  chlorhexidine 2% Cloths 1 Application(s) Topical <User Schedule>  enoxaparin Injectable 90 milliGRAM(s) SubCutaneous every 12 hours  ipratropium 17 MICROgram(s) HFA Inhaler 2 Puff(s) Inhalation every 6 hours  losartan 50 milliGRAM(s) Oral daily  nystatin Cream 1 Application(s) Topical two times a day  pantoprazole   Suspension 40 milliGRAM(s) Oral two times a day  vitamin A & D Ointment 1 Application(s) Topical two times a day    PRN MEDICATIONS  acetaminophen     Tablet .. 650 milliGRAM(s) Oral every 6 hours PRN  albuterol    90 MICROgram(s) HFA Inhaler 2 Puff(s) Inhalation every 6 hours PRN  HYDROmorphone  Injectable 0.5 milliGRAM(s) IV Push every 4 hours PRN    VITALS  T(F): 98.2 (11-25-24 @ 14:45), Max: 98.2 (11-25-24 @ 04:59)  HR: 65 (11-25-24 @ 19:30) (58 - 72)  BP: 112/76 (11-25-24 @ 14:45) (112/76 - 144/62)  RR: 18 (11-25-24 @ 14:45) (18 - 18)  SpO2: 100% (11-25-24 @ 19:30) (98% - 100%)  POCT Blood Glucose.: 124 mg/dL (11-25-24 @ 18:05)  POCT Blood Glucose.: 87 mg/dL (11-25-24 @ 12:08)  POCT Blood Glucose.: 122 mg/dL (11-25-24 @ 05:41)  POCT Blood Glucose.: 88 mg/dL (11-24-24 @ 23:40)    PHYSICAL EXAM  GENERAL  ( X ) NAD, lying in bed comfortably     (  ) obtunded     (  ) lethargic     (  ) somnolent    HEAD  ( X ) Atraumatic     (  ) hematoma     (  ) laceration (specify location:       )     NECK  ( X ) Supple     (  ) neck stiffness     (  ) nuchal rigidity     (  )  no JVD     (  ) JVD present ( -- cm)   ( X ) trach present, no erythema or secretions     HEART  Rate -->  ( X ) normal rate    (  ) bradycardic    (  ) tachycardic  Rhythm -->  ( X ) regular    (  ) regularly irregular    (  ) irregularly irregular  Murmurs -->  ( X ) normal s1/s2    (  ) systolic murmur    (  ) diastolic murmur    (  ) continuous murmur     (  ) S3 present    (  ) S4 present    LUNGS  ( X )Unlabored respirations     (  ) tachypnea  ( X ) B/L air entry     (  ) decreased breath sounds in:  (location     )    ( x ) no adventitious sound     (  ) crackles     (  ) wheezing      (  ) rhonchi      (specify location:       )  (  ) chest wall tenderness (specify location:       )    ABDOMEN  ( X ) Soft     (  ) tense   |   ( X ) nondistended     (  ) distended   |   ( X ) +BS     (  ) hypoactive bowel sounds     (  ) hyperactive bowel sounds  ( X ) nontender     (  ) RUQ tenderness     (  ) RLQ tenderness     (  ) LLQ tenderness     (  ) epigastric tenderness     (  ) diffuse tenderness  (  ) Splenomegaly      (  ) Hepatomegaly      (  ) Jaundice     (  ) ecchymosis       EXTREMITIES  ( X ) Normal     (  ) Rash     (  ) ecchymosis     (  ) varicose veins      (  ) pitting edema     (  ) non-pitting edema   (  ) ulceration     (  ) gangrene:     (location:     )    NERVOUS SYSTEM  ( X ) A&Ox3     (  ) confused     (  ) lethargic  CN II-XII:     (  ) Intact     (  ) focal deficits  (Specify:     )   Upper extremities:     (  ) strength X/5     (  ) focal deficit (specify:    )  Lower extremities:     (  ) strength  X/5    (  ) focal deficit (specify:    )    SKIN  ( X ) No rashes or lesions     (  ) maculopapular rash     (  ) pustules     (  ) vesicles     (  ) ulcer     (  ) ecchymosis     (specify location:     )      LABS             8.0    7.14  )-----------( 155      ( 11-25-24 @ 04:30 )             25.7     145  |  111  |  14  -------------------------<  97   11-25-24 @ 04:30  3.1  |  22  |  0.7    Ca      9.5     11-25-24 @ 04:30  Phos   2.9     11-25-24 @ 04:30  Mg     1.9     11-25-24 @ 04:30    TPro  5.0  /  Alb  2.8  /  TBili  0.4  /  DBili  x   /  AST  8   /  ALT  5   /  AlkPhos  97  /  GGT  x     11-25-24 @ 04:30        Urinalysis Basic - ( 25 Nov 2024 04:30 )    Color: x / Appearance: x / SG: x / pH: x  Gluc: 97 mg/dL / Ketone: x  / Bili: x / Urobili: x   Blood: x / Protein: x / Nitrite: x   Leuk Esterase: x / RBC: x / WBC x   Sq Epi: x / Non Sq Epi: x / Bacteria: x          Culture - Stool (collected 23 Nov 2024 14:11)  Source: .Stool  Preliminary Report (24 Nov 2024 19:32):    No enteric pathogens to date: Final culture pending      IMAGING

## 2024-11-25 NOTE — PROGRESS NOTE ADULT - SUBJECTIVE AND OBJECTIVE BOX
PATRICIA SPRAGUE  [facility]-N 3A 024 A (SI-N 3A)      Patient was evaluated and examined  by bedside, still has diarrhea      REVIEW OF SYSTEMS: unable to obtain, patient is a poor historian         T(C): , Max: 36.8 (11-25-24 @ 04:59)  HR: 58 (11-25-24 @ 04:59)  BP: 144/62 (11-25-24 @ 04:59)  RR: 18 (11-25-24 @ 04:59)  SpO2: 99% (11-25-24 @ 08:24)  CAPILLARY BLOOD GLUCOSE      POCT Blood Glucose.: 87 mg/dL (25 Nov 2024 12:08)  POCT Blood Glucose.: 122 mg/dL (25 Nov 2024 05:41)  POCT Blood Glucose.: 88 mg/dL (24 Nov 2024 23:40)  POCT Blood Glucose.: 125 mg/dL (24 Nov 2024 17:03)      PHYSICAL EXAM:  General: NAD, sleepy, patient is laying comfortably in bed  HEENT: AT, NC, Supple, trach present   Lungs: good breath sounds B/L, no wheezing, no rhonchi  CVS: normal S1, S2, RRR, NO M/G/R  Abdomen: soft, bowel sounds present, non-tender, non-distended, peg present  Extremities: no edema, no clubbing, no cyanosis, positive peripheral pulses b/l  Neuro: bed-confinement status  Skin: no rash, no ecchymosis      LAB  CBC  Date: 11-25-24 @ 04:30  Mean cell Lkhgiwwzhc91.5  Mean cell Hemoglobin Conc31.1  Mean cell Volum 94.8  Platelet count-Automate 155  RBC Count 2.71  Red Cell Distrib Width13.2  WBC Count7.14  % Albumin, Urine--  Hematocrit 25.7  Hemoglobin 8.0  CBC  Date: 11-24-24 @ 06:28  Mean cell Xxcdqxgmqu44.3  Mean cell Hemoglobin Conc30.7  Mean cell Volum 95.5  Platelet count-Automate 176  RBC Count 2.66  Red Cell Distrib Width13.2  WBC Count7.23  % Albumin, Urine--  Hematocrit 25.4  Hemoglobin 7.8  CBC  Date: 11-23-24 @ 08:40  Mean cell Ffukdywdfj10.9  Mean cell Hemoglobin Conc31.0  Mean cell Volum 96.2  Platelet count-Automate 247  RBC Count 2.88  Red Cell Distrib Width13.0  WBC Count9.00  % Albumin, Urine--  Hematocrit 27.7  Hemoglobin 8.6  CBC  Date: 11-22-24 @ 08:41  Mean cell Mwtmwfbggp00.3  Mean cell Hemoglobin Conc30.5  Mean cell Volum 96.2  Platelet count-Automate 135  RBC Count 2.66  Red Cell Distrib Width13.3  WBC Count8.04  % Albumin, Urine--  Hematocrit 25.6  Hemoglobin 7.8  CBC  Date: 11-21-24 @ 05:46  Mean cell Xmofzupuzc74.7  Mean cell Hemoglobin Conc31.5  Mean cell Volum 94.3  Platelet count-Automate 261  RBC Count 2.63  Red Cell Distrib Width13.1  WBC Count7.93  % Albumin, Urine--  Hematocrit 24.8  Hemoglobin 7.8  CBC  Date: 11-20-24 @ 04:32  Mean cell Rkiahiicss93.7  Mean cell Hemoglobin Conc31.0  Mean cell Volum 96.1  Platelet count-Automate 263  RBC Count 2.79  Red Cell Distrib Width13.2  WBC Count8.90  % Albumin, Urine--  Hematocrit 26.8  Hemoglobin 8.3  CBC  Date: 11-19-24 @ 06:10  Mean cell Eloambhtrz09.0  Mean cell Hemoglobin Conc31.1  Mean cell Volum 96.4  Platelet count-Automate 250  RBC Count 2.77  Red Cell Distrib Width13.2  WBC Count10.29  % Albumin, Urine--  Hematocrit 26.7  Hemoglobin 8.3  CBC  Date: 11-18-24 @ 22:45  Mean cell Jwbatgdjup50.7  Mean cell Hemoglobin Conc30.9  Mean cell Volum 96.2  Platelet count-Automate 284  RBC Count 2.93  Red Cell Distrib Width13.1  WBC Count11.35  % Albumin, Urine--  Hematocrit 28.2  Hemoglobin 8.7    [facility]  11-25-24 @ 04:30  Blood Gas Arterial-Calcium,Ionized--  Blood Urea Nitrogen, Serum 14 mg/dL [10 - 20]  Carbon Dioxide, Serum22 mmol/L [17 - 32]  Chloride, Adaku117 mmol/L[H] [98 - 110]  Creatinie, Serum0.7 mg/dL [0.7 - 1.5]  Glucose, Serum97 mg/dL [70 - 99]  Potassium, Serum3.1 mmol/L[L] [3.5 - 5.0]  Sodium, Serum 145 mmol/L [135 - 146]  [facility]  11-24-24 @ 06:28  Blood Gas Arterial-Calcium,Ionized--  Blood Urea Nitrogen, Serum 13 mg/dL [10 - 20]  Carbon Dioxide, Serum24 mmol/L [17 - 32]  Chloride, Qhnrz393 mmol/L[H] [98 - 110]  Creatinie, Serum0.6 mg/dL[L] [0.7 - 1.5]  Glucose, Serum92 mg/dL [70 - 99]  Potassium, Serum3.7 mmol/L [3.5 - 5.0]  Sodium, Serum 143 mmol/L [135 - 146]  [facility]  11-23-24 @ 08:40  Blood Gas Arterial-Calcium,Ionized--  Blood Urea Nitrogen, Serum 11 mg/dL [10 - 20]  Carbon Dioxide, Serum24 mmol/L [17 - 32]  Chloride, Hvbgq020 mmol/L[H] [98 - 110]  Creatinie, Serum0.7 mg/dL [0.7 - 1.5]  Glucose, Serum85 mg/dL [70 - 99]  Potassium, Serum3.7 mmol/L [3.5 - 5.0]  Sodium, Serum 147 mmol/L[H] [135 - 146]  [facility]  11-23-24 @ 00:35  Blood Gas Arterial-Calcium,Ionized--  Blood Urea Nitrogen, Serum 10 mg/dL [10 - 20]  Carbon Dioxide, Serum23 mmol/L [17 - 32]  Chloride, Tqttz038 mmol/L[H] [98 - 110]  Creatinie, Serum0.6 mg/dL[L] [0.7 - 1.5]  Glucose, Serum90 mg/dL [70 - 99]  Potassium, Serum3.7 mmol/L [3.5 - 5.0]  Sodium, Serum 144 mmol/L [135 - 146]  [facility]  11-22-24 @ 18:10  Blood Gas Arterial-Calcium,Ionized--  Blood Urea Nitrogen, Serum 12 mg/dL [10 - 20]  Carbon Dioxide, Serum23 mmol/L [17 - 32]  Chloride, Mbvkn315 mmol/L[H] [98 - 110]  Creatinie, Serum0.6 mg/dL[L] [0.7 - 1.5]  Glucose, Yjzzt313 mg/dL[H] [70 - 99]  Potassium, Serum3.2 mmol/L[L] [3.5 - 5.0]  Sodium, Serum 144 mmol/L [135 - 146]  [facility]  11-22-24 @ 08:41  Blood Gas Arterial-Calcium,Ionized--  Blood Urea Nitrogen, Serum 12 mg/dL [10 - 20]  Carbon Dioxide, Serum20 mmol/L [17 - 32]  Chloride, Rozxk003 mmol/L[H] [98 - 110]  Creatinie, Serum0.7 mg/dL [0.7 - 1.5]  Glucose, Gzlwy707 mg/dL[H] [70 - 99]  Potassium, Serum3.5 mmol/L [3.5 - 5.0] [Slighty Hemolyzed use with Caution]  Sodium, Serum 147 mmol/L[H] [135 - 146]  [facility]  11-21-24 @ 20:00  Blood Gas Arterial-Calcium,Ionized--  Blood Urea Nitrogen, Serum 11 mg/dL [10 - 20]  Carbon Dioxide, Serum25 mmol/L [17 - 32]  Chloride, Tnetj064 mmol/L[H] [98 - 110]  Creatinie, Serum0.7 mg/dL [0.7 - 1.5]  Glucose, Serum92 mg/dL [70 - 99]  Potassium, Serum3.0 mmol/L[L] [3.5 - 5.0]  Sodium, Serum 149 mmol/L[H] [135 - 146]  [facility]  11-21-24 @ 05:46  Blood Gas Arterial-Calcium,Ionized--  Blood Urea Nitrogen, Serum 11 mg/dL [10 - 20]  Carbon Dioxide, Serum26 mmol/L [17 - 32]  Chloride, Sebjr959 mmol/L[H] [98 - 110]  Creatinie, Serum0.7 mg/dL [0.7 - 1.5]  Glucose, Bejvn008 mg/dL[H] [70 - 99]  Potassium, Serum3.4 mmol/L[L] [3.5 - 5.0]  Sodium, Serum 148 mmol/L[H] [135 - 146]              Microbiology:    Culture - Stool (collected 11-23-24 @ 14:11)  Source: .Stool  Preliminary Report (11-24-24 @ 19:32):    No enteric pathogens to date: Final culture pending    Culture - Fungal, Bronchial (collected 11-20-24 @ 12:00)  Source: Bronchial  Preliminary Report (11-24-24 @ 07:49):    No growth    Culture - Acid Fast - Bronchial w/Smear (collected 11-20-24 @ 12:00)  Source: Bronchial  Preliminary Report (11-23-24 @ 23:06):    Culture is being performed.    Culture - Bronchial (collected 11-20-24 @ 12:00)  Source: Bronchial  Gram Stain (11-20-24 @ 23:18):    Moderate polymorphonuclear leukocytes per low power field    No squamous epithelial cells    Few Gram Positive Cocci in Clusters per oil power field  Final Report (11-22-24 @ 09:09):    Commensal rosalva consistent with body site    Culture - Fungal, Bronchial (collected 11-19-24 @ 11:25)  Source: Bronchial  Preliminary Report (11-22-24 @ 09:10):    No growth    Culture - Acid Fast - Bronchial w/Smear (collected 11-19-24 @ 11:25)  Source: Bronchial  Preliminary Report (11-20-24 @ 23:07):    Culture is being performed.    Culture - Bronchial (collected 11-19-24 @ 11:25)  Source: Bronch Wash  Gram Stain (11-19-24 @ 23:26):    Rare polymorphonuclear leukocytes per low power field    No Squamous epithelial cells per low power field    No organisms seen per oil power field  Final Report (11-21-24 @ 08:25):    Commensal rosalva consistent with body site    Culture - Viral, General (collected 11-12-24 @ 09:01)    Culture - Fungal, Bronchial (collected 11-12-24 @ 09:01)  Source: Bronchial  Preliminary Report (11-20-24 @ 23:02):    No fungus isolated at 1 week.    Culture - Acid Fast - Bronchial w/Smear (collected 11-12-24 @ 09:01)  Source: Bronch Wash  Preliminary Report (11-20-24 @ 23:06):    No acid-fast bacilli isolated at 1 week. ****Acid-fast cultures are held    for 6 weeks.****    Culture - Bronchial (collected 11-12-24 @ 09:01)  Source: Bronch Wash  Gram Stain (11-12-24 @ 20:33):    No Squamous epithelial cells seen per low power field    Rare polymorphonuclear leukocytes seen per low power field    No organisms seen per oil power field  Final Report (11-14-24 @ 09:38):    Commensal rosalva consistent with body site    Culture - Sputum (collected 11-08-24 @ 12:36)  Source: Trach Asp Tracheal Aspirate  Gram Stain (11-09-24 @ 07:34):    Few polymorphonuclear leukocytes per low power field    No Squamous epithelial cells per low power field    Rare Gram positive cocci in pairs seen per oil power field    Rare Gram Negative Rods seen per oil power field  Final Report (11-10-24 @ 09:45):    No growth    Urinalysis with Rflx Culture (collected 11-03-24 @ 13:51)    Culture - Stool (collected 11-03-24 @ 13:51)  Source: .Stool  Final Report (11-05-24 @ 19:02):    No enteric pathogens isolated.    (Stool culture examined for Salmonella,    Shigella, Campylobacter, Aeromonas, Plesiomonas,    Vibrio, E.coli O157 and Yersinia)    Culture - Urine (collected 11-03-24 @ 13:51)  Source: Clean Catch None  Final Report (11-05-24 @ 12:11):    >100,000 CFU/ml Streptococcus gallolyticus "Susceptibilities not    performed"    <10,000 CFU/ml Normal Urogenital rosalva present    Culture - Blood (collected 11-03-24 @ 11:10)  Source: .Blood BLOOD  Final Report (11-08-24 @ 18:00):    No growth at 5 days    Culture - Blood (collected 11-03-24 @ 11:10)  Source: .Blood BLOOD  Final Report (11-08-24 @ 18:00):    No growth at 5 days        Medications:  acetaminophen     Tablet .. 650 milliGRAM(s) Oral every 6 hours PRN  albuterol    90 MICROgram(s) HFA Inhaler 2 Puff(s) Inhalation every 6 hours PRN  albuterol/ipratropium for Nebulization 3 milliLiter(s) Nebulizer every 6 hours  amLODIPine   Tablet 10 milliGRAM(s) Oral daily  chlorhexidine 0.12% Liquid 15 milliLiter(s) Oral Mucosa every 12 hours  chlorhexidine 2% Cloths 1 Application(s) Topical <User Schedule>  enoxaparin Injectable 90 milliGRAM(s) SubCutaneous every 12 hours  HYDROmorphone  Injectable 0.5 milliGRAM(s) IV Push every 4 hours PRN  ipratropium 17 MICROgram(s) HFA Inhaler 2 Puff(s) Inhalation every 6 hours  losartan 50 milliGRAM(s) Oral daily  nystatin Cream 1 Application(s) Topical two times a day  pantoprazole   Suspension 40 milliGRAM(s) Oral two times a day  vitamin A & D Ointment 1 Application(s) Topical two times a day        Assessment and Plan:  73-year-old female PMH A-fib on xarelto, HTN, s/p cholecystectomy, congenital solitary kidney presents from NH to the ED for evaluation of weakness, decreased PO intake and abdominal distension.     Admitted to ICU for septic shock requiring pressors 2/2 ESBL proteus (UTI vs. GI source) complicated by gastric distension w/ pneumatosis, NOLA 2/2 ATN, hypernatremia. s/p NGT decompression (-3.1 L feculent output) in ED. Intubated 11/6 for AHRF. EGD 11/8 w/ multiple ulcers, largest 10cm, path +gastritis. Now with new R colon/cecal mass pending colonoscopy when more stable.   Extubated 11/13 to AVAPs, weaned to HFNC however transitioned back to BiPAP after desaturation. Patient was weak and unable to clear secretions despite non-invasive measures. Reintubated 11/18 PM, s/p trach/PEJ 11/19 with thoracic sx.    # Norovirus Diarrhea- GI PCR on 11/23 and 1/24- positive x 2 , GI PCR ,  on contact isolation , supportive tx.  - stool cdiff on 11/23 neg    #AHRF 2/2 septic shock due to ESBL proteus (UTI Vs. GI source) s/p intubation x2, s/p trach 11/19  #HAGMA 2/2 lactic acidosis and uremia - resolved   #NOLA likely ATN 2/2 septic shock - resolved   #Possible aspiration  #Congenital solitary kidney  - Intubated 11/6, extubated 11/13 to AVAPS, re-intubated 11/18, s/p trach 11/19  - UCx +ESBL proteus, BCX (-), trach culture with resp rosalva, sputum cx 11/12 resp rosalva  - off pressors, off sedation  - s/p abx (finished 11/13)  - c/w mechanical ventilation, SBT as tolerated   - pain management,  chest PT/MDI q6h  - post bronch on 11/20- neg , f/u bronch cx/gram stain (11/18-neg , 11/19- neg )      #Dysphagia 2/2 critical illness s/p PEJ  - PEJ w/ thoracic 11/19- tolerating feedings well      #R colon/cecal mass on CT A/P  #Multiple gastric ulcers iso chronic gastritis   #Abdominal distension w/ pneumatosis - resolved   #Diarrhea - recurrent on 11/23- obtain GI PCR  - CT A/P on admission with severe gastric distension with new pneumatosis along posterior gastric wall suspicious for ischemia, foci of free intraperitoneal air ; suspicious portal venous gas; thick bladder wall (cystitis vs colovesicular fistula)   - s/p EGD 11/8 with multiple ulcers, erosive gastritis:  EGD path: chronic gastritis (-) H pylori, no malignancy   - repeated CT A/P with PO contrast 11.8 w/ suspicious hepatic flexure narrowing and lobulated soft tissue density in R colon/cecum.   -  Cdiff (-)  -  PPI BID   - colonoscopy when more stable as per GI    #Hypernatremia- corrected . free water via peg, daily BMP  # Hypomagnesemia - supplemented        #Sacral DTI - offloading, wound care, pressure injury prevention    #Afib- resumed on therapeutic lovenox tx.    #HTN - amlodipine 10mg qd PO, losartan 50mg qd PO (home meds)    DVT ppx:  on lovenox tx.    Diet: NPO w/ PEJ feeding  GI ppx/Bowel regimen: PPI BID    Code status: full code    #Progress Note Handoff: monitor for resolution of diarrhea,  electrolytes supplemented, repeat BMP, Mg, Phos in am ,   vent management as per Pulm. team  Family discussion: yes, medical team Disposition: SNF     Total time spent to complete patient's bedside assessment, review medical chart, discuss medical plan of care with covering medical team was more than 55 minutes with >50% of time spent face to face with patient, discussion with patient/family and/or coordination of care .

## 2024-11-25 NOTE — PROGRESS NOTE ADULT - ASSESSMENT
IMPRESSION:    Acute hypoxemic respiratory failure SP trach and PEG   Left lung atelectasis SP multiple bronchoscopies   Septic shock - resolved  UTI  Suspected colon mass  Severe gastric distension w/ pneumatosis along posterior gastric wall  Possible aspiration / LLL consolidation  Intraperitoneal free air  Thrombocytopenia   hypernatremia  gastric ulcer   HAGMA  NOLA non oliguric resolved  Afib on Xarelto  HO HTN  HO ESBL Proteus    PLAN:    CNS:  Pain control as needed .     HEENT: Oral care. Trach care     PULMONARY: Vent changes;  Wean o2 as tolerated.  40%,  PEEP to 8.  Keep O2 sat 92-96%.  Pulmonary toilet.  CXR noted, PS as tolerated     CARDIOVASCULAR: Avoid overload.     GI: GI prophylaxis.  Tube feeding today.  Bowel regimen.      RENAL:  Follow up lytes. Correct as needed.    INFECTIOUS DISEASE: SP Meropenem course.  Monitor VS     HEMATOLOGICAL: Therapeutic Lovenox.  Monitor CBC     ENDOCRINE:  Follow up FS. Insulin protocol if needed.    MUSCULOSKELETAL: Bed chair position.  Off loading    Full code.   Vent unit  case discussed with team  DC planing

## 2024-11-25 NOTE — PROGRESS NOTE ADULT - ASSESSMENT
73-year-old female PMH A-fib on xarelto,, HTN, s/p cholecystectomy, congenital solitary kidney presents from NH to the ED for evaluation of weakness, decreased PO intake and abdominal distension.   Admitted to ICU for septic shock 2/2 ESBL proteus (UTI vs. GI source) complicated by gastric distension w/ pneumatosis, NOLA 2/2 ATN, hypernatremia. s/p NGT decompression (-3.1 L feculent output) in ED. Intubated 11/6 for AHRF. EGD 11/8 w/ multiple ulcers, largest 10cm), path +gastritis. Now with new R colon/cecal mass pending colonoscopy when more stable.   Extubated 11/13 to AVAPs, weaned to HFNC however transitioned back to BiPAP after desaturation. Patient was weak and unable to clear secretions despite non-invasive measures. Reintubated 11/18 PM, s/p trach/PEJ 11.19 with thoracic.     #AHRF 2/2 septic shock due to ESBL proteus UTI s/p intubation x2, s/p trach 11/19  #HAGMA 2/2 lactic acidosis and uremia - resolved   #NOLA likely ATN 2/2 septic shock - resolved   #Possible aspiration  #Congenital solitary kidney  - Intubated 11/6, extubated 11/13 to AVAPS, re-intubated 11/18, s/p trach placement by CT surg 11/19  - UCx +ESBL proteus, BCX (-), trach culture with resp rosalva, sputum cx 11/12 resp rosalva  - s/p abx (freddie, diflucan, flagyl, vanc)  - c/w mechanical ventilation  - daily SBT, chest PT  - bronch 11/18 and 11/19 for mucus plugging       - few Gram pos cocci in clusters- commensal rosalva       - Per ID, monitor off abx, if clinically worsens can start linezolid  - Switched ipratropium inhaler to nebs  - CXR 11/22- improved      # Norovirus Diarrhea  - GI PCR on 11/23 and 1/24- positive x 2   - stool cdiff on 11/23 neg  - supportive treatment       #Dysphagia 2/2 critical illness s/p PEJ  - S&S eval - failed  - NGT placement - failed multiple times by ICU staff and ENT,  - s/p PEJ placement by CT surg 11/19  - C/w feeds       #Abdominal distension w/ pneumatosis   #R colon/cecal mass on CT A/P  #Diarrhea- new on 11/23  - NGT placed for decompression in ED with 3.1L feculent output   - GI recs, surgery recs, vascular recs appreciated   - s/p EGD 11/8 with multiple ulcers, erosive gastritis  - EGD path: chronic gastritis, (-) Hpylori, no malignancy   - CT A/P with PO contrast w/ suspicious hepatic flexure narrowing and lobulated soft tissue density in R colon/cecum.   - TSH wnl  - c/w IV PPI BID   - colonoscopy when more stable as per GI      #Sacral DTI  - care per wound care team    #Afib  - holding xarelto   - c/w therapeutic lovenox  - TTE 11/05- EF 71%, hyperdynamic, LA enlargement, fibrocalcific aortic valve w/ moderate AS, trivial pericardial effusion    #HTN  - C/w amlodipine 10mg qd PO and losartan 50mg qd PO home meds     #MISC  - DVT ppx: lovenox  - Diet: NPO w/ TF   - GI ppx: Protonix BID  - Activity: as tolerated

## 2024-11-25 NOTE — OCCUPATIONAL THERAPY INITIAL EVALUATION ADULT - PERTINENT HX OF CURRENT PROBLEM, REHAB EVAL
73-year-old female PMH A-fib on xarelto,, HTN, s/p cholecystectomy, congenital solitary kidney presents from NH to the ED for evaluation of weakness, decreased PO intake and abdominal distension. Pt's son Everton says pt has not been feeling well the past few days, and has had poor appetite which is unlike her. She's been more tired and weak, unable to get out of bed. Has not had a bowel movement in 3 days, pt unsure if she's been passing gas otherwise. She was noted to be hypotensive and have an elevated wbc count at the NH as well. No dysuria, chest pain, SOB, cough or fever per pt otherwise.  No other complaints currently.

## 2024-11-26 LAB
ALBUMIN SERPL ELPH-MCNC: 2.9 G/DL — LOW (ref 3.5–5.2)
ALP SERPL-CCNC: 90 U/L — SIGNIFICANT CHANGE UP (ref 30–115)
ALT FLD-CCNC: 6 U/L — SIGNIFICANT CHANGE UP (ref 0–41)
ANION GAP SERPL CALC-SCNC: 7 MMOL/L — SIGNIFICANT CHANGE UP (ref 7–14)
AST SERPL-CCNC: 7 U/L — SIGNIFICANT CHANGE UP (ref 0–41)
BASOPHILS # BLD AUTO: 0.06 K/UL — SIGNIFICANT CHANGE UP (ref 0–0.2)
BASOPHILS NFR BLD AUTO: 0.7 % — SIGNIFICANT CHANGE UP (ref 0–1)
BILIRUB SERPL-MCNC: 0.3 MG/DL — SIGNIFICANT CHANGE UP (ref 0.2–1.2)
BUN SERPL-MCNC: 15 MG/DL — SIGNIFICANT CHANGE UP (ref 10–20)
CALCIUM SERPL-MCNC: 9.7 MG/DL — SIGNIFICANT CHANGE UP (ref 8.4–10.4)
CHLORIDE SERPL-SCNC: 115 MMOL/L — HIGH (ref 98–110)
CO2 SERPL-SCNC: 24 MMOL/L — SIGNIFICANT CHANGE UP (ref 17–32)
CREAT SERPL-MCNC: 0.8 MG/DL — SIGNIFICANT CHANGE UP (ref 0.7–1.5)
EGFR: 78 ML/MIN/1.73M2 — SIGNIFICANT CHANGE UP
EOSINOPHIL # BLD AUTO: 0.54 K/UL — SIGNIFICANT CHANGE UP (ref 0–0.7)
EOSINOPHIL NFR BLD AUTO: 6 % — SIGNIFICANT CHANGE UP (ref 0–8)
GI PCR PANEL: ABNORMAL
GLUCOSE BLDC GLUCOMTR-MCNC: 123 MG/DL — HIGH (ref 70–99)
GLUCOSE BLDC GLUCOMTR-MCNC: 123 MG/DL — HIGH (ref 70–99)
GLUCOSE BLDC GLUCOMTR-MCNC: 90 MG/DL — SIGNIFICANT CHANGE UP (ref 70–99)
GLUCOSE BLDC GLUCOMTR-MCNC: 98 MG/DL — SIGNIFICANT CHANGE UP (ref 70–99)
GLUCOSE BLDC GLUCOMTR-MCNC: 99 MG/DL — SIGNIFICANT CHANGE UP (ref 70–99)
GLUCOSE SERPL-MCNC: 129 MG/DL — HIGH (ref 70–99)
HCT VFR BLD CALC: 25.7 % — LOW (ref 37–47)
HGB BLD-MCNC: 8.2 G/DL — LOW (ref 12–16)
IMM GRANULOCYTES NFR BLD AUTO: 1.1 % — HIGH (ref 0.1–0.3)
LYMPHOCYTES # BLD AUTO: 1.57 K/UL — SIGNIFICANT CHANGE UP (ref 1.2–3.4)
LYMPHOCYTES # BLD AUTO: 17.3 % — LOW (ref 20.5–51.1)
MAGNESIUM SERPL-MCNC: 1.9 MG/DL — SIGNIFICANT CHANGE UP (ref 1.8–2.4)
MCHC RBC-ENTMCNC: 29.9 PG — SIGNIFICANT CHANGE UP (ref 27–31)
MCHC RBC-ENTMCNC: 31.9 G/DL — LOW (ref 32–37)
MCV RBC AUTO: 93.8 FL — SIGNIFICANT CHANGE UP (ref 81–99)
MONOCYTES # BLD AUTO: 0.8 K/UL — HIGH (ref 0.1–0.6)
MONOCYTES NFR BLD AUTO: 8.8 % — SIGNIFICANT CHANGE UP (ref 1.7–9.3)
NEUTROPHILS # BLD AUTO: 5.98 K/UL — SIGNIFICANT CHANGE UP (ref 1.4–6.5)
NEUTROPHILS NFR BLD AUTO: 66.1 % — SIGNIFICANT CHANGE UP (ref 42.2–75.2)
NOROVIRUS GI+II RNA STL QL NAA+NON-PROBE: ABNORMAL
NRBC # BLD: 0 /100 WBCS — SIGNIFICANT CHANGE UP (ref 0–0)
PLATELET # BLD AUTO: 252 K/UL — SIGNIFICANT CHANGE UP (ref 130–400)
PMV BLD: 11.1 FL — HIGH (ref 7.4–10.4)
POTASSIUM SERPL-MCNC: 3.2 MMOL/L — LOW (ref 3.5–5)
POTASSIUM SERPL-SCNC: 3.2 MMOL/L — LOW (ref 3.5–5)
PROT SERPL-MCNC: 5.2 G/DL — LOW (ref 6–8)
RBC # BLD: 2.74 M/UL — LOW (ref 4.2–5.4)
RBC # FLD: 13.3 % — SIGNIFICANT CHANGE UP (ref 11.5–14.5)
SODIUM SERPL-SCNC: 146 MMOL/L — SIGNIFICANT CHANGE UP (ref 135–146)
WBC # BLD: 9.05 K/UL — SIGNIFICANT CHANGE UP (ref 4.8–10.8)
WBC # FLD AUTO: 9.05 K/UL — SIGNIFICANT CHANGE UP (ref 4.8–10.8)

## 2024-11-26 PROCEDURE — 99232 SBSQ HOSP IP/OBS MODERATE 35: CPT

## 2024-11-26 PROCEDURE — 99233 SBSQ HOSP IP/OBS HIGH 50: CPT

## 2024-11-26 RX ORDER — POTASSIUM CHLORIDE 600 MG/1
40 TABLET, EXTENDED RELEASE ORAL EVERY 4 HOURS
Refills: 0 | Status: COMPLETED | OUTPATIENT
Start: 2024-11-26 | End: 2024-11-26

## 2024-11-26 RX ADMIN — PANTOPRAZOLE SODIUM 40 MILLIGRAM(S): 40 TABLET, DELAYED RELEASE ORAL at 05:53

## 2024-11-26 RX ADMIN — POTASSIUM CHLORIDE 40 MILLIEQUIVALENT(S): 600 TABLET, EXTENDED RELEASE ORAL at 11:34

## 2024-11-26 RX ADMIN — ENOXAPARIN SODIUM 90 MILLIGRAM(S): 30 INJECTION SUBCUTANEOUS at 21:05

## 2024-11-26 RX ADMIN — POTASSIUM CHLORIDE 40 MILLIEQUIVALENT(S): 600 TABLET, EXTENDED RELEASE ORAL at 13:59

## 2024-11-26 RX ADMIN — Medication 2 PUFF(S): at 03:42

## 2024-11-26 RX ADMIN — Medication 2 PUFF(S): at 08:12

## 2024-11-26 RX ADMIN — CHLORHEXIDINE GLUCONATE 1 APPLICATION(S): 1.2 RINSE ORAL at 05:53

## 2024-11-26 RX ADMIN — NYSTATIN 1 APPLICATION(S): 100000 POWDER TOPICAL at 05:54

## 2024-11-26 RX ADMIN — CHLORHEXIDINE GLUCONATE 15 MILLILITER(S): 1.2 RINSE ORAL at 05:54

## 2024-11-26 RX ADMIN — Medication 1 APPLICATION(S): at 05:54

## 2024-11-26 RX ADMIN — PANTOPRAZOLE SODIUM 40 MILLIGRAM(S): 40 TABLET, DELAYED RELEASE ORAL at 17:29

## 2024-11-26 RX ADMIN — LOSARTAN POTASSIUM 50 MILLIGRAM(S): 100 TABLET, FILM COATED ORAL at 05:53

## 2024-11-26 RX ADMIN — CHLORHEXIDINE GLUCONATE 15 MILLILITER(S): 1.2 RINSE ORAL at 17:27

## 2024-11-26 RX ADMIN — Medication 1 APPLICATION(S): at 17:29

## 2024-11-26 RX ADMIN — Medication 2 PUFF(S): at 20:09

## 2024-11-26 RX ADMIN — NYSTATIN 1 APPLICATION(S): 100000 POWDER TOPICAL at 17:29

## 2024-11-26 RX ADMIN — AMLODIPINE BESYLATE 10 MILLIGRAM(S): 10 TABLET ORAL at 05:53

## 2024-11-26 RX ADMIN — ENOXAPARIN SODIUM 90 MILLIGRAM(S): 30 INJECTION SUBCUTANEOUS at 11:33

## 2024-11-26 RX ADMIN — Medication 2 PUFF(S): at 15:06

## 2024-11-26 NOTE — PROGRESS NOTE ADULT - ASSESSMENT
IMPRESSION:    Acute hypoxemic respiratory failure SP trach and PEG   Left lung atelectasis SP multiple bronchoscopies   Septic shock - resolved  UTI  Suspected colon mass  Severe gastric distension w/ pneumatosis along posterior gastric wall  Possible aspiration / LLL consolidation  Intraperitoneal free air  Thrombocytopenia   hypernatremia  gastric ulcer   HAGMA  NOLA non oliguric resolved  Afib on Xarelto  HO HTN  HO ESBL Proteus    PLAN:    CNS:  Pain control as needed .     HEENT: Oral care. Trach care     PULMONARY: Vent changes;  Wean o2 as tolerated.  40%,  PEEP to 8.  Keep O2 sat 92-96%.  Pulmonary toilet.  CXR noted, PS as tolerated     CARDIOVASCULAR: Avoid overload.     GI: GI prophylaxis.  Tube feeding today.  Bowel regimen.      RENAL:  Follow up lytes. Correct as needed.    INFECTIOUS DISEASE: SP Meropenem course.  Monitor VS     HEMATOLOGICAL: Therapeutic AC.  Monitor CBC     ENDOCRINE:  Follow up FS. Insulin protocol if needed.    MUSCULOSKELETAL: Bed chair position.  Off loading    Full code.   Vent unit  case discussed with team  DC planing

## 2024-11-26 NOTE — PROGRESS NOTE ADULT - SUBJECTIVE AND OBJECTIVE BOX
Over Night Events: events noted, vent dependant, low grade fever    PHYSICAL EXAM    ICU Vital Signs Last 24 Hrs  T(C): 37.4 (26 Nov 2024 05:00), Max: 37.4 (26 Nov 2024 05:00)  T(F): 99.4 (26 Nov 2024 05:00), Max: 99.4 (26 Nov 2024 05:00)  HR: 64 (26 Nov 2024 05:00) (62 - 72)  BP: 134/70 (26 Nov 2024 05:00) (105/73 - 134/70)  RR: 18 (26 Nov 2024 05:00) (18 - 18)  SpO2: 100% (26 Nov 2024 05:00) (99% - 100%)        General: ill looking  trac  Lungs: Bilateral rhonchi  Cardiovascular: Regular   Abdomen: Soft, Positive BS  Extremities: No clubbing   follows commands      11-24-24 @ 07:01  -  11-25-24 @ 07:00  --------------------------------------------------------  IN:    Enteral Tube Flush: 620 mL    Jevity 1.2: 1425 mL  Total IN: 2045 mL    OUT:    Rectal Tube (mL): 500 mL  Total OUT: 500 mL    Total NET: 1545 mL      11-25-24 @ 07:01  -  11-26-24 @ 06:09  --------------------------------------------------------  IN:    Enteral Tube Flush: 190 mL    Enteral Tube Flush: 200 mL    Jevity 1.2: 1055 mL  Total IN: 1445 mL    OUT:  Total OUT: 0 mL    Total NET: 1445 mL          LABS:                          8.2    9.05  )-----------( 252      ( 26 Nov 2024 05:38 )             25.7                                               11-25    145  |  111[H]  |  14  ----------------------------<  97  3.1[L]   |  22  |  0.7    Ca    9.5      25 Nov 2024 04:30  Phos  2.9     11-25  Mg     1.9     11-25    TPro  5.0[L]  /  Alb  2.8[L]  /  TBili  0.4  /  DBili  x   /  AST  8   /  ALT  5   /  AlkPhos  97  11-25                                             Urinalysis Basic - ( 25 Nov 2024 04:30 )    Color: x / Appearance: x / SG: x / pH: x  Gluc: 97 mg/dL / Ketone: x  / Bili: x / Urobili: x   Blood: x / Protein: x / Nitrite: x   Leuk Esterase: x / RBC: x / WBC x   Sq Epi: x / Non Sq Epi: x / Bacteria: x                                                  LIVER FUNCTIONS - ( 25 Nov 2024 04:30 )  Alb: 2.8 g/dL / Pro: 5.0 g/dL / ALK PHOS: 97 U/L / ALT: 5 U/L / AST: 8 U/L / GGT: x                                                  Culture - Stool (collected 23 Nov 2024 14:11)  Source: .Stool  Final Report (25 Nov 2024 22:02):    No enteric pathogens isolated.    (Stool culture examined for Salmonella,    Shigella, Campylobacter, Aeromonas, Plesiomonas,    Vibrio, E.coli O157 and Yersinia)                                                   Mode: AC/ CMV (Assist Control/ Continuous Mandatory Ventilation)  RR (machine): 14  TV (machine): 360  FiO2: 40  PEEP: 8  ITime: 0.8  MAP: 10  PIP: 18                                          MEDICATIONS  (STANDING):  albuterol/ipratropium for Nebulization 3 milliLiter(s) Nebulizer every 6 hours  amLODIPine   Tablet 10 milliGRAM(s) Oral daily  chlorhexidine 0.12% Liquid 15 milliLiter(s) Oral Mucosa every 12 hours  chlorhexidine 2% Cloths 1 Application(s) Topical <User Schedule>  enoxaparin Injectable 90 milliGRAM(s) SubCutaneous every 12 hours  ipratropium 17 MICROgram(s) HFA Inhaler 2 Puff(s) Inhalation every 6 hours  losartan 50 milliGRAM(s) Oral daily  nystatin Cream 1 Application(s) Topical two times a day  pantoprazole   Suspension 40 milliGRAM(s) Oral two times a day  vitamin A & D Ointment 1 Application(s) Topical two times a day    MEDICATIONS  (PRN):  acetaminophen     Tablet .. 650 milliGRAM(s) Oral every 6 hours PRN Temp greater or equal to 38C (100.4F)  albuterol    90 MICROgram(s) HFA Inhaler 2 Puff(s) Inhalation every 6 hours PRN Shortness of Breath and/or Wheezing  HYDROmorphone  Injectable 0.5 milliGRAM(s) IV Push every 4 hours PRN Severe Pain (7 - 10)

## 2024-11-26 NOTE — PROGRESS NOTE ADULT - SUBJECTIVE AND OBJECTIVE BOX
SUBJECTIVE/OVERNIGHT EVENTS  Today is hospital day 23d. This morning patient was seen and examined at bedside, resting comfortably in bed. No acute or major events overnight.      CODE STATUS:      PMH:  HTN (hypertension)    Diabetes mellitus    Obesity    Gastroesophageal reflux disease    Active asthma    Generalized OA    Afib          PSH:  H/O hernia repair    History of cholecystectomy          MEDICATIONS  STANDING MEDICATIONS  amLODIPine   Tablet 10 milliGRAM(s) Oral daily  chlorhexidine 0.12% Liquid 15 milliLiter(s) Oral Mucosa every 12 hours  chlorhexidine 2% Cloths 1 Application(s) Topical <User Schedule>  enoxaparin Injectable 90 milliGRAM(s) SubCutaneous every 12 hours  ipratropium 17 MICROgram(s) HFA Inhaler 2 Puff(s) Inhalation every 6 hours  losartan 50 milliGRAM(s) Oral daily  nystatin Cream 1 Application(s) Topical two times a day  pantoprazole   Suspension 40 milliGRAM(s) Oral two times a day  vitamin A & D Ointment 1 Application(s) Topical two times a day    PRN MEDICATIONS  acetaminophen     Tablet .. 650 milliGRAM(s) Oral every 6 hours PRN  albuterol    90 MICROgram(s) HFA Inhaler 2 Puff(s) Inhalation every 6 hours PRN  HYDROmorphone  Injectable 0.5 milliGRAM(s) IV Push every 4 hours PRN    VITALS  T(F): 98.1 (11-26-24 @ 12:00), Max: 99.4 (11-26-24 @ 05:00)  HR: 81 (11-26-24 @ 12:00) (63 - 81)  BP: 141/75 (11-26-24 @ 12:00) (105/73 - 141/75)  RR: 19 (11-26-24 @ 12:00) (18 - 19)  SpO2: 99% (11-26-24 @ 12:00) (99% - 100%)  POCT Blood Glucose.: 99 mg/dL (11-26-24 @ 11:34)  POCT Blood Glucose.: 123 mg/dL (11-26-24 @ 06:08)  POCT Blood Glucose.: 123 mg/dL (11-26-24 @ 00:21)  POCT Blood Glucose.: 124 mg/dL (11-25-24 @ 18:05)    PHYSICAL EXAM  GENERAL  ( X ) NAD, lying in bed comfortably     (  ) obtunded     (  ) lethargic     (  ) somnolent    HEAD  ( X ) Atraumatic     (  ) hematoma     (  ) laceration (specify location:       )     NECK  ( X ) Supple     (  ) neck stiffness     (  ) nuchal rigidity     (  )  no JVD     (  ) JVD present ( -- cm)   ( X ) trach present, no erythema or secretions     HEART  Rate -->  ( X ) normal rate    (  ) bradycardic    (  ) tachycardic  Rhythm -->  ( X ) regular    (  ) regularly irregular    (  ) irregularly irregular  Murmurs -->  ( X ) normal s1/s2    (  ) systolic murmur    (  ) diastolic murmur    (  ) continuous murmur     (  ) S3 present    (  ) S4 present    LUNGS  Tracheostomy  ( X )Unlabored respirations     (  ) tachypnea  ( X ) B/L air entry     (  ) decreased breath sounds in:  (location     )    ( x ) no adventitious sound     (  ) crackles     (  ) wheezing      (  ) rhonchi      (specify location:       )  (  ) chest wall tenderness (specify location:       )    ABDOMEN  PEG tube  ( X ) Soft     (  ) tense   |   ( X ) nondistended     (  ) distended   |   ( X ) +BS     (  ) hypoactive bowel sounds     (  ) hyperactive bowel sounds  ( X ) nontender     (  ) RUQ tenderness     (  ) RLQ tenderness     (  ) LLQ tenderness     (  ) epigastric tenderness     (  ) diffuse tenderness  (  ) Splenomegaly      (  ) Hepatomegaly      (  ) Jaundice     (  ) ecchymosis       EXTREMITIES  ( X ) Normal     (  ) Rash     (  ) ecchymosis     (  ) varicose veins      (  ) pitting edema     (  ) non-pitting edema   (  ) ulceration     (  ) gangrene:     (location:     )    NERVOUS SYSTEM  (  ) A&Ox3     (  ) confused     (  ) lethargic  CN II-XII:     (  ) Intact     (  ) focal deficits  (Specify:     )   Upper extremities:     (  ) strength X/5     (  ) focal deficit (specify:    )  Lower extremities:     (  ) strength  X/5    (  ) focal deficit (specify:    )    SKIN  ( X ) No rashes or lesions     (  ) maculopapular rash     (  ) pustules     (  ) vesicles     (  ) ulcer     (  ) ecchymosis     (specify location:     )    LABS             8.2    9.05  )-----------( 252      ( 11-26-24 @ 05:38 )             25.7     146  |  115  |  15  -------------------------<  129   11-26-24 @ 05:38  3.2  |  24  |  0.8    Ca      9.7     11-26-24 @ 05:38  Phos   2.9     11-25-24 @ 04:30  Mg     1.9     11-26-24 @ 05:38    TPro  5.2  /  Alb  2.9  /  TBili  0.3  /  DBili  x   /  AST  7   /  ALT  6   /  AlkPhos  90  /  GGT  x     11-26-24 @ 05:38        Urinalysis Basic - ( 26 Nov 2024 05:38 )    Color: x / Appearance: x / SG: x / pH: x  Gluc: 129 mg/dL / Ketone: x  / Bili: x / Urobili: x   Blood: x / Protein: x / Nitrite: x   Leuk Esterase: x / RBC: x / WBC x   Sq Epi: x / Non Sq Epi: x / Bacteria: x

## 2024-11-26 NOTE — PROGRESS NOTE ADULT - ATTENDING COMMENTS
73-year-old female PMH A-fib on xarelto, HTN, s/p cholecystectomy, congenital solitary kidney presents from NH to the ED for evaluation of weakness, decreased PO intake and abdominal distension.     Admitted to ICU for septic shock requiring pressors 2/2 ESBL proteus (UTI vs. GI source) complicated by gastric distension w/ pneumatosis, NOLA 2/2 ATN, hypernatremia. s/p NGT decompression (-3.1 L feculent output) in ED. Intubated 11/6 for AHRF. EGD 11/8 w/ multiple ulcers, largest 10cm, path +gastritis. Now with new R colon/cecal mass pending colonoscopy when more stable.   Extubated 11/13 to AVAPs, weaned to HFNC however transitioned back to BiPAP after desaturation. Patient was weak and unable to clear secretions despite non-invasive measures. Reintubated 11/18 PM, s/p trach/PEJ 11/19 with thoracic sx.    # Norovirus Diarrhea- GI PCR on 11/23 and 1/24- positive x 2 , GI PCR ,  on contact isolation , supportive tx.  - stool cdiff on 11/23 neg  -11/26/24- still with persistent watery diarrhea noted in dignishield     #AHRF 2/2 septic shock due to ESBL proteus (UTI Vs. GI source) s/p intubation x2, s/p trach 11/19  #HAGMA 2/2 lactic acidosis and uremia - resolved   #NOLA likely ATN 2/2 septic shock - resolved   #Possible aspiration  #Congenital solitary kidney  - [facility] re-[facility]/18, s/p trach 11/19  - UCx +ESBL proteus, BCX (-), trach culture with resp rosalva, sputum cx 11/12 resp rosalva  - off pressors, off sedation  - s/p abx (finished 11/13)  - c/w mechanical ventilation, SBT as tolerated   - pain management,  chest PT/MDI q6h  - post bronch on 11/20- neg , f/u bronch cx/gram stain (11/18-neg , 11/19- neg )      #Dysphagia 2/2 critical illness s/p PEJ  - PEJ w/ thoracic 11/19- tolerating feedings well      #R colon/cecal mass on CT A/P  #Multiple gastric ulcers iso chronic gastritis   #Abdominal distension w/ pneumatosis - resolved   #Diarrhea - recurrent on 11/23- obtain GI PCR  - CT A/P on admission with severe gastric distension with new pneumatosis along posterior gastric wall suspicious for ischemia, foci of free intraperitoneal air ; suspicious portal venous gas; thick bladder wall (cystitis vs colovesicular fistula)   - s/p EGD 11/8 with multiple ulcers, erosive gastritis:  EGD path: chronic gastritis (-) H pylori, no malignancy   - repeated CT A/P with PO contrast 11.8 w/ suspicious hepatic flexure narrowing and lobulated soft tissue density in R colon/cecum.   -  Cdiff (-)  -  PPI BID   - colonoscopy when more stable as per GI    #Hypernatremia- corrected . free water via peg, daily BMP  # Hypomagnesemia - supplemented        #Sacral DTI - offloading, wound care, pressure injury prevention    #Afib- resumed on therapeutic lovenox tx.    #HTN - amlodipine 10mg qd PO, losartan 50mg qd PO (home meds)    DVT ppx:  on lovenox tx.    Diet: NPO w/ PEJ feeding  GI ppx/Bowel regimen: PPI BID    Code status: full code    #Progress Note Handoff: monitor for resolution of diarrhea,  electrolytes supplemented, repeat BMP, Mg, Phos in am ,   vent management as per Pulm. team  Family discussion: current patient's condition and medical plan of care d/w family by bedside  Disposition: [facility]     Total time spent to complete patient's bedside assessment, review medical chart, discuss medical plan of care with covering medical team was more than 35 minutes with >50% of time spent face to face with patient, discussion with patient/family and/or coordination of care .

## 2024-11-27 LAB
ALBUMIN SERPL ELPH-MCNC: 2.8 G/DL — LOW (ref 3.5–5.2)
ALP SERPL-CCNC: 87 U/L — SIGNIFICANT CHANGE UP (ref 30–115)
ALT FLD-CCNC: 6 U/L — SIGNIFICANT CHANGE UP (ref 0–41)
ANION GAP SERPL CALC-SCNC: 9 MMOL/L — SIGNIFICANT CHANGE UP (ref 7–14)
AST SERPL-CCNC: 8 U/L — SIGNIFICANT CHANGE UP (ref 0–41)
BASOPHILS # BLD AUTO: 0.07 K/UL — SIGNIFICANT CHANGE UP (ref 0–0.2)
BASOPHILS NFR BLD AUTO: 0.9 % — SIGNIFICANT CHANGE UP (ref 0–1)
BILIRUB SERPL-MCNC: 0.3 MG/DL — SIGNIFICANT CHANGE UP (ref 0.2–1.2)
BUN SERPL-MCNC: 17 MG/DL — SIGNIFICANT CHANGE UP (ref 10–20)
CALCIUM SERPL-MCNC: 10.1 MG/DL — SIGNIFICANT CHANGE UP (ref 8.4–10.5)
CHLORIDE SERPL-SCNC: 115 MMOL/L — HIGH (ref 98–110)
CO2 SERPL-SCNC: 22 MMOL/L — SIGNIFICANT CHANGE UP (ref 17–32)
CREAT SERPL-MCNC: 0.7 MG/DL — SIGNIFICANT CHANGE UP (ref 0.7–1.5)
EGFR: 92 ML/MIN/1.73M2 — SIGNIFICANT CHANGE UP
EOSINOPHIL # BLD AUTO: 0.48 K/UL — SIGNIFICANT CHANGE UP (ref 0–0.7)
EOSINOPHIL NFR BLD AUTO: 6.3 % — SIGNIFICANT CHANGE UP (ref 0–8)
GLUCOSE BLDC GLUCOMTR-MCNC: 111 MG/DL — HIGH (ref 70–99)
GLUCOSE BLDC GLUCOMTR-MCNC: 92 MG/DL — SIGNIFICANT CHANGE UP (ref 70–99)
GLUCOSE BLDC GLUCOMTR-MCNC: 95 MG/DL — SIGNIFICANT CHANGE UP (ref 70–99)
GLUCOSE SERPL-MCNC: 109 MG/DL — HIGH (ref 70–99)
HCT VFR BLD CALC: 26.5 % — LOW (ref 37–47)
HGB BLD-MCNC: 8.2 G/DL — LOW (ref 12–16)
IMM GRANULOCYTES NFR BLD AUTO: 1.2 % — HIGH (ref 0.1–0.3)
LYMPHOCYTES # BLD AUTO: 1.79 K/UL — SIGNIFICANT CHANGE UP (ref 1.2–3.4)
LYMPHOCYTES # BLD AUTO: 23.6 % — SIGNIFICANT CHANGE UP (ref 20.5–51.1)
MAGNESIUM SERPL-MCNC: 1.7 MG/DL — LOW (ref 1.8–2.4)
MCHC RBC-ENTMCNC: 29.6 PG — SIGNIFICANT CHANGE UP (ref 27–31)
MCHC RBC-ENTMCNC: 30.9 G/DL — LOW (ref 32–37)
MCV RBC AUTO: 95.7 FL — SIGNIFICANT CHANGE UP (ref 81–99)
MONOCYTES # BLD AUTO: 0.74 K/UL — HIGH (ref 0.1–0.6)
MONOCYTES NFR BLD AUTO: 9.7 % — HIGH (ref 1.7–9.3)
NEUTROPHILS # BLD AUTO: 4.43 K/UL — SIGNIFICANT CHANGE UP (ref 1.4–6.5)
NEUTROPHILS NFR BLD AUTO: 58.3 % — SIGNIFICANT CHANGE UP (ref 42.2–75.2)
NRBC # BLD: 0 /100 WBCS — SIGNIFICANT CHANGE UP (ref 0–0)
PLATELET # BLD AUTO: 243 K/UL — SIGNIFICANT CHANGE UP (ref 130–400)
PMV BLD: 12.1 FL — HIGH (ref 7.4–10.4)
POTASSIUM SERPL-MCNC: 3.3 MMOL/L — LOW (ref 3.5–5)
POTASSIUM SERPL-SCNC: 3.3 MMOL/L — LOW (ref 3.5–5)
PROT SERPL-MCNC: 5 G/DL — LOW (ref 6–8)
RBC # BLD: 2.77 M/UL — LOW (ref 4.2–5.4)
RBC # FLD: 13.4 % — SIGNIFICANT CHANGE UP (ref 11.5–14.5)
SODIUM SERPL-SCNC: 146 MMOL/L — SIGNIFICANT CHANGE UP (ref 135–146)
WBC # BLD: 7.6 K/UL — SIGNIFICANT CHANGE UP (ref 4.8–10.8)
WBC # FLD AUTO: 7.6 K/UL — SIGNIFICANT CHANGE UP (ref 4.8–10.8)

## 2024-11-27 PROCEDURE — 99232 SBSQ HOSP IP/OBS MODERATE 35: CPT

## 2024-11-27 RX ORDER — POTASSIUM CHLORIDE 600 MG/1
40 TABLET, EXTENDED RELEASE ORAL ONCE
Refills: 0 | Status: COMPLETED | OUTPATIENT
Start: 2024-11-27 | End: 2024-11-27

## 2024-11-27 RX ORDER — MAGNESIUM GLUCONATE 29.25 MG
500 TABLET ORAL DAILY
Refills: 0 | Status: DISCONTINUED | OUTPATIENT
Start: 2024-11-27 | End: 2024-11-27

## 2024-11-27 RX ORDER — POTASSIUM CHLORIDE 600 MG/1
20 TABLET, EXTENDED RELEASE ORAL ONCE
Refills: 0 | Status: COMPLETED | OUTPATIENT
Start: 2024-11-27 | End: 2024-11-27

## 2024-11-27 RX ADMIN — ENOXAPARIN SODIUM 90 MILLIGRAM(S): 30 INJECTION SUBCUTANEOUS at 10:36

## 2024-11-27 RX ADMIN — Medication 25 GRAM(S): at 10:31

## 2024-11-27 RX ADMIN — Medication 2 PUFF(S): at 08:28

## 2024-11-27 RX ADMIN — CHLORHEXIDINE GLUCONATE 15 MILLILITER(S): 1.2 RINSE ORAL at 05:51

## 2024-11-27 RX ADMIN — NYSTATIN 1 APPLICATION(S): 100000 POWDER TOPICAL at 17:22

## 2024-11-27 RX ADMIN — AMLODIPINE BESYLATE 10 MILLIGRAM(S): 10 TABLET ORAL at 05:52

## 2024-11-27 RX ADMIN — CHLORHEXIDINE GLUCONATE 15 MILLILITER(S): 1.2 RINSE ORAL at 17:21

## 2024-11-27 RX ADMIN — PANTOPRAZOLE SODIUM 40 MILLIGRAM(S): 40 TABLET, DELAYED RELEASE ORAL at 05:52

## 2024-11-27 RX ADMIN — POTASSIUM CHLORIDE 50 MILLIEQUIVALENT(S): 600 TABLET, EXTENDED RELEASE ORAL at 13:39

## 2024-11-27 RX ADMIN — Medication 1 APPLICATION(S): at 05:51

## 2024-11-27 RX ADMIN — Medication 2 PUFF(S): at 20:08

## 2024-11-27 RX ADMIN — CHLORHEXIDINE GLUCONATE 1 APPLICATION(S): 1.2 RINSE ORAL at 05:52

## 2024-11-27 RX ADMIN — Medication 2 PUFF(S): at 13:50

## 2024-11-27 RX ADMIN — POTASSIUM CHLORIDE 40 MILLIEQUIVALENT(S): 600 TABLET, EXTENDED RELEASE ORAL at 10:32

## 2024-11-27 RX ADMIN — LOSARTAN POTASSIUM 50 MILLIGRAM(S): 100 TABLET, FILM COATED ORAL at 05:51

## 2024-11-27 RX ADMIN — PANTOPRAZOLE SODIUM 40 MILLIGRAM(S): 40 TABLET, DELAYED RELEASE ORAL at 17:22

## 2024-11-27 RX ADMIN — ENOXAPARIN SODIUM 90 MILLIGRAM(S): 30 INJECTION SUBCUTANEOUS at 21:25

## 2024-11-27 RX ADMIN — Medication 1 APPLICATION(S): at 17:22

## 2024-11-27 RX ADMIN — Medication 2 PUFF(S): at 02:50

## 2024-11-27 NOTE — CHART NOTE - NSCHARTNOTEFT_GEN_A_CORE
Registered Dietitian Follow-Up     Patient Profile Reviewed                           Yes [X]   No []     Nutrition History Previously Obtained        Yes []  No [X]       Pertinent Subjective Information: Per RN, patient experiencing diarrhea. Denies n/v or other GI s/s. Pt receiving no carb prosource and banatrol as ordered per RN.     Pertinent Medical Interventions: 73-year-old female PMH A-fib on xarelto,, HTN, s/p cholecystectomy, congenital solitary kidney presents from NH to the ED for evaluation of weakness, decreased PO intake and abdominal distension.   #AHRF 2/2 septic shock due to ESBL proteus UTI s/p intubation x2, s/p trach   #HAGMA 2/2 lactic acidosis and uremia - resolved   #NOLA likely ATN 2/2 septic shock - resolved   #Possible aspiration  #Congenital solitary kidney  - Intubated , extubated  to AVAPS, re-intubated , s/p trach placement by CT surg   # Norovirus Diarrhea  - GI PCR on  and - positive x 2   #Dysphagia 2/2 critical illness s/p PEJ  - S&S eval - failed  - NGT placement - failed multiple times by ICU staff and ENT,  - s/p PEJ placement by CT surg   - C/w feeds   #Abdominal distension w/ pneumatosis   #R colon/cecal mass on CT A/P  #Diarrhea- new on        Diet order:   Diet, NPO with Tube Feed:   Tube Feeding Modality: Gastro-Jejunostomy  Jevity 1.2 Jeff (JEVITY1.2RTH)  Total Volume for 24 Hours (mL): 1350  Continuous  Starting Tube Feed Rate {mL per Hour}: 25  Until Goal Tube Feed Rate (mL per Hour): 75  Tube Feed Duration (in Hours): 18  Tube Feed Start Time: 12:00  Free Water Flush Instructions:  100 mL q4hrs; Please flush with 30 mL pre/post no carb prosource  No Carb Prosource (1pkg = 15gms Protein)     Qty per Day:  2  Banatrol TF     Qty per Day:  4 per day (24 @ 08:15) [Active]        Anthropometrics:  Height (cm): 167.6 (24 @ 20:17)  Weight (kg): 90.2 (11-20-24 @ 20:17)  BMI (kg/m2): 32.1 (-20-24 @ 20:17)    Daily Weight in k.1 (-23), Weight in k.4 (-22)    MEDICATIONS  (STANDING):  amLODIPine   Tablet 10 milliGRAM(s) Oral daily  chlorhexidine 0.12% Liquid 15 milliLiter(s) Oral Mucosa every 12 hours  chlorhexidine 2% Cloths 1 Application(s) Topical <User Schedule>  enoxaparin Injectable 90 milliGRAM(s) SubCutaneous every 12 hours  ipratropium 17 MICROgram(s) HFA Inhaler 2 Puff(s) Inhalation every 6 hours  losartan 50 milliGRAM(s) Oral daily  nystatin Cream 1 Application(s) Topical two times a day  pantoprazole   Suspension 40 milliGRAM(s) Oral two times a day  vitamin A & D Ointment 1 Application(s) Topical two times a day    MEDICATIONS  (PRN):  acetaminophen     Tablet .. 650 milliGRAM(s) Oral every 6 hours PRN Temp greater or equal to 38C (100.4F)  albuterol    90 MICROgram(s) HFA Inhaler 2 Puff(s) Inhalation every 6 hours PRN Shortness of Breath and/or Wheezing  HYDROmorphone  Injectable 0.5 milliGRAM(s) IV Push every 4 hours PRN Severe Pain (7 - 10)    Pertinent Labs:  @ 06:13: Na 146, BUN 17, Cr 0.7, [H], K+ 3.3[L], Phos --, Mg 1.7[L], Alk Phos 87, ALT/SGPT 6, AST/SGOT 8, HbA1c --    Finger Sticks:  POCT Blood Glucose.: 92 mg/dL ( @ 11:30)  POCT Blood Glucose.: 95 mg/dL ( @ 06:48)  POCT Blood Glucose.: 98 mg/dL ( @ 23:51)  POCT Blood Glucose.: 90 mg/dL ( @ 17:00)    Physical Findings:  - Appearance: Trached  - GI function: No n/v. Diarrhea , with dignishield on   - Tubes: GJ  - Oral/Mouth cavity: NPO w/ TF  - Skin: Sacral DTI per flowsheet is healed, no other pressure injuries  - Edema: 1+ generalized edema      Nutrition Requirements:  Weight Used: 91.6 KG Using ABW -- adjusted as patient no longer intubated with consideration of age, BMI, acuity of illness     Estimated Energy Needs    Continue [X]  Adjust []  ENERGY: 7857-9051 kcal/day (20-25 kcal/kg)     Estimated Protein Needs    Continue []  Adjust [X]  PROTEIN: 70.8 - 99 g/day (1.2-1.4 g/kg IBW 59 KG)     Estimated Fluid Needs        Continue []  Adjust [X]  FLUID: 1 mL/kcal/day     Nutrient Intake: Current EN regimen provides + No Carb Prosource 2x/day (120 kcal, 30 gm protein) + Banatrol 4x/day (180 kcal) : 1920 kcal, 105 gm Protein, 1089 mL H20 from formula + 600 mL from fwf    [X] Previous Nutrition Diagnosis: Inadequate Protein Energy Intake             [] Ongoing          [X] Resolved - EN at goal rate    [X] No active nutrition diagnosis identified at this time     Nutrition Education: Deferred     Goal/Expected Outcome: Pt to meet >85% and <105% of estimated needs in 3-5 days.      Indicator/Monitoring: EN regimen/tolerance, GI s/s, BG, skin status, NFPE    Recommendation:   1. Continue with current EN regimen as tolerated + No carb prosource 2x/day and Banatrol 3x/day   2. Maintain aspiration precautions and HOB >45  3. Monitor GI s/s and BM's    Pt at high risk     RD to remain available: Tay Garcia x [phone] or via TEAMS

## 2024-11-27 NOTE — PROGRESS NOTE ADULT - SUBJECTIVE AND OBJECTIVE BOX
SUBJECTIVE/OVERNIGHT EVENTS  Today is hospital day 24d. This morning patient was seen and examined at bedside, resting comfortably in bed. No acute or major events overnight.      CODE STATUS:      PMH:  HTN (hypertension)    Diabetes mellitus    Obesity    Gastroesophageal reflux disease    Active asthma    Generalized OA    Afib          PSH:  H/O hernia repair    History of cholecystectomy          MEDICATIONS  STANDING MEDICATIONS  amLODIPine   Tablet 10 milliGRAM(s) Oral daily  chlorhexidine 0.12% Liquid 15 milliLiter(s) Oral Mucosa every 12 hours  chlorhexidine 2% Cloths 1 Application(s) Topical <User Schedule>  enoxaparin Injectable 90 milliGRAM(s) SubCutaneous every 12 hours  ipratropium 17 MICROgram(s) HFA Inhaler 2 Puff(s) Inhalation every 6 hours  losartan 50 milliGRAM(s) Oral daily  nystatin Cream 1 Application(s) Topical two times a day  pantoprazole   Suspension 40 milliGRAM(s) Oral two times a day  vitamin A & D Ointment 1 Application(s) Topical two times a day    PRN MEDICATIONS  acetaminophen     Tablet .. 650 milliGRAM(s) Oral every 6 hours PRN  albuterol    90 MICROgram(s) HFA Inhaler 2 Puff(s) Inhalation every 6 hours PRN  HYDROmorphone  Injectable 0.5 milliGRAM(s) IV Push every 4 hours PRN    VITALS  T(F): 97.4 (11-27-24 @ 14:00), Max: 98.2 (11-26-24 @ 19:10)  HR: 77 (11-27-24 @ 14:00) (59 - 77)  BP: 133/74 (11-27-24 @ 14:00) (113/62 - 136/68)  RR: 18 (11-27-24 @ 14:00) (18 - 19)  SpO2: 100% (11-27-24 @ 14:00) (98% - 100%)  POCT Blood Glucose.: 92 mg/dL (11-27-24 @ 11:30)  POCT Blood Glucose.: 95 mg/dL (11-27-24 @ 06:48)  POCT Blood Glucose.: 98 mg/dL (11-26-24 @ 23:51)    PHYSICAL EXAM  GENERAL  ( X ) NAD, lying in bed comfortably     (  ) obtunded     (  ) lethargic     (  ) somnolent    HEAD  ( X ) Atraumatic     (  ) hematoma     (  ) laceration (specify location:       )     NECK  ( X ) Supple     (  ) neck stiffness     (  ) nuchal rigidity     (  )  no JVD     (  ) JVD present ( -- cm)   ( X ) trach present, no erythema or secretions     HEART  Rate -->  ( X ) normal rate    (  ) bradycardic    (  ) tachycardic  Rhythm -->  ( X ) regular    (  ) regularly irregular    (  ) irregularly irregular  Murmurs -->  ( X ) normal s1/s2    (  ) systolic murmur    (  ) diastolic murmur    (  ) continuous murmur     (  ) S3 present    (  ) S4 present    LUNGS  Tracheostomy  ( X )Unlabored respirations     (  ) tachypnea  ( X ) B/L air entry     (  ) decreased breath sounds in:  (location     )    ( x ) no adventitious sound     (  ) crackles     (  ) wheezing      (  ) rhonchi      (specify location:       )  (  ) chest wall tenderness (specify location:       )    ABDOMEN  PEG tube  ( X ) Soft     (  ) tense   |   ( X ) nondistended     (  ) distended   |   ( X ) +BS     (  ) hypoactive bowel sounds     (  ) hyperactive bowel sounds  ( X ) nontender     (  ) RUQ tenderness     (  ) RLQ tenderness     (  ) LLQ tenderness     (  ) epigastric tenderness     (  ) diffuse tenderness  (  ) Splenomegaly      (  ) Hepatomegaly      (  ) Jaundice     (  ) ecchymosis       EXTREMITIES  ( X ) Normal     (  ) Rash     (  ) ecchymosis     (  ) varicose veins      (  ) pitting edema     (  ) non-pitting edema   (  ) ulceration     (  ) gangrene:     (location:     )    NERVOUS SYSTEM  (  ) A&Ox3     (  ) confused     (  ) lethargic  CN II-XII:     (  ) Intact     (  ) focal deficits  (Specify:     )   Upper extremities:     (  ) strength X/5     (  ) focal deficit (specify:    )  Lower extremities:     (  ) strength  X/5    (  ) focal deficit (specify:    )    SKIN  ( X ) No rashes or lesions     (  ) maculopapular rash     (  ) pustules     (  ) vesicles     (  ) ulcer     (  ) ecchymosis     (specify location:     )    LABS             8.2    7.60  )-----------( 243      ( 11-27-24 @ 06:13 )             26.5     146  |  115  |  17  -------------------------<  109   11-27-24 @ 06:13  3.3  |  22  |  0.7    Ca      10.1     11-27-24 @ 06:13  Mg     1.7     11-27-24 @ 06:13    TPro  5.0  /  Alb  2.8  /  TBili  0.3  /  DBili  x   /  AST  8   /  ALT  6   /  AlkPhos  87  /  GGT  x     11-27-24 @ 06:13        Urinalysis Basic - ( 27 Nov 2024 06:13 )    Color: x / Appearance: x / SG: x / pH: x  Gluc: 109 mg/dL / Ketone: x  / Bili: x / Urobili: x   Blood: x / Protein: x / Nitrite: x   Leuk Esterase: x / RBC: x / WBC x   Sq Epi: x / Non Sq Epi: x / Bacteria: x

## 2024-11-27 NOTE — PROGRESS NOTE ADULT - SUBJECTIVE AND OBJECTIVE BOX
S: continues to have diarrhea.      All other pertinent ROS negative.      11-26-24 @ 07:01  -  11-27-24 @ 07:00  --------------------------------------------------------  IN: 0 mL / OUT: 300 mL / NET: -300 mL    11-27-24 @ 07:01  -  11-27-24 @ 17:43  --------------------------------------------------------  IN: 0 mL / OUT: 300 mL / NET: -300 mL      Vital Signs Last 24 Hrs  T(C): 36.3 (27 Nov 2024 14:00), Max: 36.8 (26 Nov 2024 19:10)  T(F): 97.4 (27 Nov 2024 14:00), Max: 98.2 (26 Nov 2024 19:10)  HR: 77 (27 Nov 2024 14:00) (59 - 77)  BP: 133/74 (27 Nov 2024 14:00) (113/62 - 136/68)  BP(mean): --  RR: 18 (27 Nov 2024 14:00) (18 - 19)  SpO2: 100% (27 Nov 2024 14:00) (98% - 100%)    Parameters below as of 27 Nov 2024 07:30  Patient On (Oxygen Delivery Method): ventilator      PHYSICAL EXAM:    Constitutional: trach/peg/vent. critically ill. opens eyes.   HEENT: PERR, EOMI, Normal Hearing, MMM  Neck: Soft and supple, No LAD, No JVD  Respiratory: Breath sounds are clear bilaterally, No wheezing, rales or rhonchi  Cardiovascular: S1 and S2, regular rate and rhythm, no Murmurs, gallops or rubs  Gastrointestinal: Bowel Sounds present, soft, nontender, nondistended, no guarding, no rebound  Extremities: edema.   malodorous diarrhea noted.      MEDICATIONS:  MEDICATIONS  (STANDING):  amLODIPine   Tablet 10 milliGRAM(s) Oral daily  chlorhexidine 0.12% Liquid 15 milliLiter(s) Oral Mucosa every 12 hours  chlorhexidine 2% Cloths 1 Application(s) Topical <User Schedule>  enoxaparin Injectable 90 milliGRAM(s) SubCutaneous every 12 hours  ipratropium 17 MICROgram(s) HFA Inhaler 2 Puff(s) Inhalation every 6 hours  losartan 50 milliGRAM(s) Oral daily  nystatin Cream 1 Application(s) Topical two times a day  pantoprazole   Suspension 40 milliGRAM(s) Oral two times a day  vitamin A & D Ointment 1 Application(s) Topical two times a day      LABS: All Labs Reviewed:                        8.2    7.60  )-----------( 243      ( 27 Nov 2024 06:13 )             26.5     11-27    146  |  115[H]  |  17  ----------------------------<  109[H]  3.3[L]   |  22  |  0.7    Ca    10.1      27 Nov 2024 06:13  Mg     1.7     11-27    TPro  5.0[L]  /  Alb  2.8[L]  /  TBili  0.3  /  DBili  x   /  AST  8   /  ALT  6   /  AlkPhos  87  11-27          Blood Culture: 11-23 @ 14:11  Organism --  Gram Stain Blood -- Gram Stain --  Specimen Source .Stool  Culture-Blood --        Radiology: reviewed

## 2024-11-27 NOTE — PROGRESS NOTE ADULT - SUBJECTIVE AND OBJECTIVE BOX
Over Night Events: events noted, vent dependant, afebrile    PHYSICAL EXAM    ICU Vital Signs Last 24 Hrs  T(C): 36.7 (27 Nov 2024 04:42), Max: 36.8 (26 Nov 2024 19:10)  T(F): 98.1 (27 Nov 2024 04:42), Max: 98.2 (26 Nov 2024 19:10)  HR: 59 (27 Nov 2024 04:42) (59 - 81)  BP: 113/62 (27 Nov 2024 04:42) (113/62 - 141/75)  RR: 18 (27 Nov 2024 04:42) (18 - 19)  SpO2: 100% (27 Nov 2024 04:42) (98% - 100%)    O2 Parameters below as of 26 Nov 2024 19:10  Patient On (Oxygen Delivery Method): ventilator            General: ill looking  trac  Lungs: dec bs l side  Cardiovascular: EARLENE 2.6  Abdomen: Soft, Positive BS  Extremities: No clubbing   Neurological: Non focal       11-25-24 @ 07:01  -  11-26-24 @ 07:00  --------------------------------------------------------  IN:    Enteral Tube Flush: 190 mL    Enteral Tube Flush: 300 mL    Jevity 1.2: 1505 mL  Total IN: 1995 mL    OUT:  Total OUT: 0 mL    Total NET: 1995 mL      11-26-24 @ 07:01  -  11-27-24 @ 05:37  --------------------------------------------------------  IN:  Total IN: 0 mL    OUT:    Voided (mL): 300 mL  Total OUT: 300 mL    Total NET: -300 mL          LABS:                          8.2    9.05  )-----------( 252      ( 26 Nov 2024 05:38 )             25.7                                               11-26    146  |  115[H]  |  15  ----------------------------<  129[H]  3.2[L]   |  24  |  0.8    Ca    9.7      26 Nov 2024 05:38  Mg     1.9     11-26    TPro  5.2[L]  /  Alb  2.9[L]  /  TBili  0.3  /  DBili  x   /  AST  7   /  ALT  6   /  AlkPhos  90  11-26                                             Urinalysis Basic - ( 26 Nov 2024 05:38 )    Color: x / Appearance: x / SG: x / pH: x  Gluc: 129 mg/dL / Ketone: x  / Bili: x / Urobili: x   Blood: x / Protein: x / Nitrite: x   Leuk Esterase: x / RBC: x / WBC x   Sq Epi: x / Non Sq Epi: x / Bacteria: x                                                  LIVER FUNCTIONS - ( 26 Nov 2024 05:38 )  Alb: 2.9 g/dL / Pro: 5.2 g/dL / ALK PHOS: 90 U/L / ALT: 6 U/L / AST: 7 U/L / GGT: x                                                                                               Mode: AC/ CMV (Assist Control/ Continuous Mandatory Ventilation)  RR (machine): 14  TV (machine): 360  FiO2: 40  PEEP: 8  ITime: 0.8  MAP: 8  PIP: 15                                          MEDICATIONS  (STANDING):  amLODIPine   Tablet 10 milliGRAM(s) Oral daily  chlorhexidine 0.12% Liquid 15 milliLiter(s) Oral Mucosa every 12 hours  chlorhexidine 2% Cloths 1 Application(s) Topical <User Schedule>  enoxaparin Injectable 90 milliGRAM(s) SubCutaneous every 12 hours  ipratropium 17 MICROgram(s) HFA Inhaler 2 Puff(s) Inhalation every 6 hours  losartan 50 milliGRAM(s) Oral daily  nystatin Cream 1 Application(s) Topical two times a day  pantoprazole   Suspension 40 milliGRAM(s) Oral two times a day  vitamin A & D Ointment 1 Application(s) Topical two times a day    MEDICATIONS  (PRN):  acetaminophen     Tablet .. 650 milliGRAM(s) Oral every 6 hours PRN Temp greater or equal to 38C (100.4F)  albuterol    90 MICROgram(s) HFA Inhaler 2 Puff(s) Inhalation every 6 hours PRN Shortness of Breath and/or Wheezing  HYDROmorphone  Injectable 0.5 milliGRAM(s) IV Push every 4 hours PRN Severe Pain (7 - 10)

## 2024-11-27 NOTE — PROGRESS NOTE ADULT - ASSESSMENT
IMPRESSION:    Acute hypoxemic respiratory failure SP trach and PEG   Left lung atelectasis SP multiple bronchoscopies   Septic shock - resolved  UTI  Suspected colon mass  Severe gastric distension w/ pneumatosis along posterior gastric wall  Possible aspiration / LLL consolidation  Thrombocytopenia   hypernatremia  gastric ulcer   HAGMA  NOLA non oliguric resolved  Afib on Xarelto  HO HTN  HO ESBL Proteus    PLAN:    CNS:  Pain control as needed .     HEENT: Oral care. Trach care     PULMONARY: Vent changes;  Wean o2 as tolerated.  40%,  PEEP to 8.  Keep O2 sat 92-96%.  Pulmonary toilet.  CXR noted, PS as tolerated     CARDIOVASCULAR: Avoid overload.     GI: GI prophylaxis.  Tube feeding today.  Bowel regimen.      RENAL:  Follow up lytes. Correct as needed.    INFECTIOUS DISEASE: SP Meropenem course.  Monitor VS     HEMATOLOGICAL: Therapeutic AC.  Monitor CBC can change to DOAC    ENDOCRINE:  Follow up FS. Insulin protocol if needed.    MUSCULOSKELETAL: Bed chair position.  Off loading    Full code.   Vent unit  case discussed with team  DC planing

## 2024-11-27 NOTE — PROGRESS NOTE ADULT - ASSESSMENT
73-year-old female PMH A-fib on xarelto, HTN, s/p cholecystectomy, congenital solitary kidney presents from NH to the ED for evaluation of weakness, decreased PO intake and abdominal distension.     Admitted to ICU for septic shock requiring pressors 2/2 ESBL proteus (UTI vs. GI source) complicated by gastric distension w/ pneumatosis, NOLA 2/2 ATN, hypernatremia. s/p NGT decompression (-3.1 L feculent output) in ED. Intubated 11/6 for AHRF. EGD 11/8 w/ multiple ulcers, largest 10cm, path +gastritis. Now with new R colon/cecal mass pending colonoscopy when more stable.   Extubated 11/13 to AVAPs, weaned to HFNC however transitioned back to BiPAP after desaturation. Patient was weak and unable to clear secretions despite non-invasive measures. Reintubated 11/18 PM, s/p trach/PEJ 11/19 with thoracic sx.    # Norovirus Diarrhea- GI PCR on 11/23 and 1/24- positive x 2 , GI PCR ,  on contact isolation , supportive tx.  - stool cdiff on 11/23 neg  -11/26, 27- still with persistent watery diarrhea noted in dignishield     #AHRF 2/2 septic shock due to ESBL proteus (UTI Vs. GI source) s/p intubation x2, s/p trach 11/19  #HAGMA 2/2 lactic acidosis and uremia - resolved   #NOLA likely ATN 2/2 septic shock - resolved   #Possible aspiration  #Congenital solitary kidney  - [facility] re-int[facility]ated 11/18, s/p trach 11/19  - UCx +ESBL proteus, BCX (-), trach culture with resp rosalva, sputum cx 11/12 resp rosalva  - off pressors, off sedation  - s/p abx (finished 11/13)  - c/w mechanical ventilation, SBT as tolerated   - pain management,  chest PT/MDI q6h  - post bronch on 11/20- neg , f/u bronch cx/gram stain (11/18-neg , 11/19- neg )      #Dysphagia 2/2 critical illness s/p PEJ  - PEJ w/ thoracic 11/19- tolerating feedings well      #R colon/cecal mass on CT A/P  #Multiple gastric ulcers iso chronic gastritis   #Abdominal distension w/ pneumatosis - resolved   #Diarrhea - recurrent on 11/23- obtain GI PCR  - CT A/P on admission with severe gastric distension with new pneumatosis along posterior gastric wall suspicious for ischemia, foci of free intraperitoneal air ; suspicious portal venous gas; thick bladder wall (cystitis vs colovesicular fistula)   - s/p EGD 11/8 with multiple ulcers, erosive gastritis:  EGD path: chronic gastritis (-) H pylori, no malignancy   - repeated CT A/P with PO contrast 11.8 w/ suspicious hepatic flexure narrowing and lobulated soft tissue density in R colon/cecum.   -  Cdiff (-)  -  PPI BID   - colonoscopy when more stable as per GI    #Hypernatremia- corrected . free water via peg, daily BMP  # Hypomagnesemia - supplemented        #Sacral DTI - offloading, wound care, pressure injury prevention    #Afib- resumed on therapeutic lovenox tx.    #HTN - amlodipine 10mg qd PO, losartan 50mg qd PO (home meds)    DVT ppx:  on lovenox tx.    Diet: NPO w/ PEJ feeding  GI ppx/Bowel regimen: PPI BID    Code status: full code    #Progress Note Handoff: monitor for resolution of diarrhea,  electrolytes supplemented, repeat BMP, Mg, Phos in am ,   vent management as per Pulm. team  Family discussion: current patient's condition and medical plan of care d/w family by bedside  Disposition: SNF after diarrhea improves.

## 2024-11-28 LAB
ALBUMIN SERPL ELPH-MCNC: 2.8 G/DL — LOW (ref 3.5–5.2)
ALP SERPL-CCNC: 79 U/L — SIGNIFICANT CHANGE UP (ref 30–115)
ALT FLD-CCNC: 6 U/L — SIGNIFICANT CHANGE UP (ref 0–41)
ANION GAP SERPL CALC-SCNC: 8 MMOL/L — SIGNIFICANT CHANGE UP (ref 7–14)
AST SERPL-CCNC: 8 U/L — SIGNIFICANT CHANGE UP (ref 0–41)
BASOPHILS # BLD AUTO: 0.06 K/UL — SIGNIFICANT CHANGE UP (ref 0–0.2)
BASOPHILS NFR BLD AUTO: 0.7 % — SIGNIFICANT CHANGE UP (ref 0–1)
BILIRUB SERPL-MCNC: 0.3 MG/DL — SIGNIFICANT CHANGE UP (ref 0.2–1.2)
BUN SERPL-MCNC: 15 MG/DL — SIGNIFICANT CHANGE UP (ref 10–20)
CALCIUM SERPL-MCNC: 9.7 MG/DL — SIGNIFICANT CHANGE UP (ref 8.4–10.5)
CHLORIDE SERPL-SCNC: 114 MMOL/L — HIGH (ref 98–110)
CO2 SERPL-SCNC: 23 MMOL/L — SIGNIFICANT CHANGE UP (ref 17–32)
CREAT SERPL-MCNC: 0.7 MG/DL — SIGNIFICANT CHANGE UP (ref 0.7–1.5)
EGFR: 92 ML/MIN/1.73M2 — SIGNIFICANT CHANGE UP
EOSINOPHIL # BLD AUTO: 0.43 K/UL — SIGNIFICANT CHANGE UP (ref 0–0.7)
EOSINOPHIL NFR BLD AUTO: 5.4 % — SIGNIFICANT CHANGE UP (ref 0–8)
GLUCOSE BLDC GLUCOMTR-MCNC: 100 MG/DL — HIGH (ref 70–99)
GLUCOSE BLDC GLUCOMTR-MCNC: 110 MG/DL — HIGH (ref 70–99)
GLUCOSE SERPL-MCNC: 93 MG/DL — SIGNIFICANT CHANGE UP (ref 70–99)
HCT VFR BLD CALC: 25.4 % — LOW (ref 37–47)
HGB BLD-MCNC: 8 G/DL — LOW (ref 12–16)
IMM GRANULOCYTES NFR BLD AUTO: 0.9 % — HIGH (ref 0.1–0.3)
LYMPHOCYTES # BLD AUTO: 1.82 K/UL — SIGNIFICANT CHANGE UP (ref 1.2–3.4)
LYMPHOCYTES # BLD AUTO: 22.7 % — SIGNIFICANT CHANGE UP (ref 20.5–51.1)
MAGNESIUM SERPL-MCNC: 1.8 MG/DL — SIGNIFICANT CHANGE UP (ref 1.8–2.4)
MCHC RBC-ENTMCNC: 29.9 PG — SIGNIFICANT CHANGE UP (ref 27–31)
MCHC RBC-ENTMCNC: 31.5 G/DL — LOW (ref 32–37)
MCV RBC AUTO: 94.8 FL — SIGNIFICANT CHANGE UP (ref 81–99)
MONOCYTES # BLD AUTO: 0.73 K/UL — HIGH (ref 0.1–0.6)
MONOCYTES NFR BLD AUTO: 9.1 % — SIGNIFICANT CHANGE UP (ref 1.7–9.3)
NEUTROPHILS # BLD AUTO: 4.92 K/UL — SIGNIFICANT CHANGE UP (ref 1.4–6.5)
NEUTROPHILS NFR BLD AUTO: 61.2 % — SIGNIFICANT CHANGE UP (ref 42.2–75.2)
NRBC # BLD: 0 /100 WBCS — SIGNIFICANT CHANGE UP (ref 0–0)
PLATELET # BLD AUTO: 238 K/UL — SIGNIFICANT CHANGE UP (ref 130–400)
PMV BLD: 11.8 FL — HIGH (ref 7.4–10.4)
POTASSIUM SERPL-MCNC: 3.7 MMOL/L — SIGNIFICANT CHANGE UP (ref 3.5–5)
POTASSIUM SERPL-SCNC: 3.7 MMOL/L — SIGNIFICANT CHANGE UP (ref 3.5–5)
PROT SERPL-MCNC: 5 G/DL — LOW (ref 6–8)
RBC # BLD: 2.68 M/UL — LOW (ref 4.2–5.4)
RBC # FLD: 13.8 % — SIGNIFICANT CHANGE UP (ref 11.5–14.5)
SODIUM SERPL-SCNC: 145 MMOL/L — SIGNIFICANT CHANGE UP (ref 135–146)
WBC # BLD: 8.03 K/UL — SIGNIFICANT CHANGE UP (ref 4.8–10.8)
WBC # FLD AUTO: 8.03 K/UL — SIGNIFICANT CHANGE UP (ref 4.8–10.8)

## 2024-11-28 PROCEDURE — 99232 SBSQ HOSP IP/OBS MODERATE 35: CPT

## 2024-11-28 RX ADMIN — Medication 2 PUFF(S): at 14:05

## 2024-11-28 RX ADMIN — AMLODIPINE BESYLATE 10 MILLIGRAM(S): 10 TABLET ORAL at 05:07

## 2024-11-28 RX ADMIN — CHLORHEXIDINE GLUCONATE 15 MILLILITER(S): 1.2 RINSE ORAL at 17:52

## 2024-11-28 RX ADMIN — PANTOPRAZOLE SODIUM 40 MILLIGRAM(S): 40 TABLET, DELAYED RELEASE ORAL at 17:52

## 2024-11-28 RX ADMIN — LOSARTAN POTASSIUM 50 MILLIGRAM(S): 100 TABLET, FILM COATED ORAL at 05:07

## 2024-11-28 RX ADMIN — ENOXAPARIN SODIUM 90 MILLIGRAM(S): 30 INJECTION SUBCUTANEOUS at 21:46

## 2024-11-28 RX ADMIN — Medication 2 PUFF(S): at 03:41

## 2024-11-28 RX ADMIN — CHLORHEXIDINE GLUCONATE 15 MILLILITER(S): 1.2 RINSE ORAL at 05:06

## 2024-11-28 RX ADMIN — Medication 2 PUFF(S): at 20:12

## 2024-11-28 RX ADMIN — PANTOPRAZOLE SODIUM 40 MILLIGRAM(S): 40 TABLET, DELAYED RELEASE ORAL at 05:07

## 2024-11-28 RX ADMIN — CHLORHEXIDINE GLUCONATE 1 APPLICATION(S): 1.2 RINSE ORAL at 05:06

## 2024-11-28 RX ADMIN — NYSTATIN 1 APPLICATION(S): 100000 POWDER TOPICAL at 05:07

## 2024-11-28 RX ADMIN — Medication 1 APPLICATION(S): at 05:07

## 2024-11-28 RX ADMIN — NYSTATIN 1 APPLICATION(S): 100000 POWDER TOPICAL at 17:52

## 2024-11-28 RX ADMIN — Medication 1 APPLICATION(S): at 17:52

## 2024-11-28 RX ADMIN — ENOXAPARIN SODIUM 90 MILLIGRAM(S): 30 INJECTION SUBCUTANEOUS at 11:18

## 2024-11-28 RX ADMIN — Medication 2 PUFF(S): at 08:13

## 2024-11-28 NOTE — PROGRESS NOTE ADULT - SUBJECTIVE AND OBJECTIVE BOX
Over Night Events: events noted, vent dependant, afebrile, on PS this am    PHYSICAL EXAM    ICU Vital Signs Last 24 Hrs  T(C): 36.4 (28 Nov 2024 04:59), Max: 36.8 (27 Nov 2024 19:54)  T(F): 97.5 (28 Nov 2024 04:59), Max: 98.3 (27 Nov 2024 19:54)  HR: 82 (28 Nov 2024 04:59) (60 - 82)  BP: 111/68 (28 Nov 2024 04:59) (111/68 - 133/74)  BP(mean): 82 (28 Nov 2024 04:59) (82 - 82)  RR: 20 (28 Nov 2024 04:59) (18 - 20)  SpO2: 100% (28 Nov 2024 04:59) (100% - 100%)    O2 Parameters below as of 28 Nov 2024 04:59  Patient On (Oxygen Delivery Method): ventilator            General: ill looking  trac  Lungs: Bilateral rhonchi  Cardiovascular: Regular   Abdomen: Soft, Positive BS  Extremities: No clubbing   Follows commands    11-27-24 @ 07:01  -  11-28-24 @ 07:00  --------------------------------------------------------  IN:    Enteral Tube Flush: 100 mL    Jevity 1.2: 450 mL  Total IN: 550 mL    OUT:    Voided (mL): 1450 mL  Total OUT: 1450 mL    Total NET: -900 mL          LABS:                          8.0    8.03  )-----------( 238      ( 28 Nov 2024 07:23 )             25.4                                               11-28    145  |  114[H]  |  15  ----------------------------<  93  3.7   |  23  |  0.7    Ca    9.7      28 Nov 2024 07:23  Mg     1.8     11-28    TPro  5.0[L]  /  Alb  2.8[L]  /  TBili  0.3  /  DBili  x   /  AST  8   /  ALT  6   /  AlkPhos  79  11-28                                             Urinalysis Basic - ( 28 Nov 2024 07:23 )    Color: x / Appearance: x / SG: x / pH: x  Gluc: 93 mg/dL / Ketone: x  / Bili: x / Urobili: x   Blood: x / Protein: x / Nitrite: x   Leuk Esterase: x / RBC: x / WBC x   Sq Epi: x / Non Sq Epi: x / Bacteria: x                                                  LIVER FUNCTIONS - ( 28 Nov 2024 07:23 )  Alb: 2.8 g/dL / Pro: 5.0 g/dL / ALK PHOS: 79 U/L / ALT: 6 U/L / AST: 8 U/L / GGT: x                                                                                               Mode: AC/ CMV (Assist Control/ Continuous Mandatory Ventilation)  RR (machine): 14  TV (machine): 360  FiO2: 40  PEEP: 8  ITime: 0.8  MAP: 12  PIP: 18                                          MEDICATIONS  (STANDING):  amLODIPine   Tablet 10 milliGRAM(s) Oral daily  chlorhexidine 0.12% Liquid 15 milliLiter(s) Oral Mucosa every 12 hours  chlorhexidine 2% Cloths 1 Application(s) Topical <User Schedule>  enoxaparin Injectable 90 milliGRAM(s) SubCutaneous every 12 hours  ipratropium 17 MICROgram(s) HFA Inhaler 2 Puff(s) Inhalation every 6 hours  losartan 50 milliGRAM(s) Oral daily  nystatin Cream 1 Application(s) Topical two times a day  pantoprazole   Suspension 40 milliGRAM(s) Oral two times a day  vitamin A & D Ointment 1 Application(s) Topical two times a day    MEDICATIONS  (PRN):  acetaminophen     Tablet .. 650 milliGRAM(s) Oral every 6 hours PRN Temp greater or equal to 38C (100.4F)  albuterol    90 MICROgram(s) HFA Inhaler 2 Puff(s) Inhalation every 6 hours PRN Shortness of Breath and/or Wheezing

## 2024-11-28 NOTE — PROGRESS NOTE ADULT - SUBJECTIVE AND OBJECTIVE BOX
S: No new events/complaints  Diarrhea continues      All other pertinent ROS negative.      11-27-24 @ 07:01  -  11-28-24 @ 07:00  --------------------------------------------------------  IN: 550 mL / OUT: 1450 mL / NET: -900 mL    11-28-24 @ 07:01  -  11-28-24 @ 14:47  --------------------------------------------------------  IN: 0 mL / OUT: 200 mL / NET: -200 mL      Vital Signs Last 24 Hrs  T(C): 36.6 (28 Nov 2024 12:00), Max: 36.8 (27 Nov 2024 19:54)  T(F): 97.8 (28 Nov 2024 12:00), Max: 98.3 (27 Nov 2024 19:54)  HR: 69 (28 Nov 2024 12:00) (60 - 82)  BP: 127/63 (28 Nov 2024 12:00) (111/68 - 130/62)  BP(mean): 85 (28 Nov 2024 12:00) (82 - 85)  RR: 20 (28 Nov 2024 04:59) (19 - 20)  SpO2: 100% (28 Nov 2024 12:00) (100% - 100%)    Parameters below as of 28 Nov 2024 04:59  Patient On (Oxygen Delivery Method): ventilator      PHYSICAL EXAM:    Constitutional: trach/peg/vent/critically ill. opens eyes.   HEENT: PERR, EOMI, Normal Hearing, MMM  Neck: Soft and supple, No LAD, No JVD  Respiratory: Breath sounds are clear bilaterally, No wheezing, rales or rhonchi  Cardiovascular: S1 and S2, regular rate and rhythm, no Murmurs, gallops or rubs  Gastrointestinal: Bowel Sounds present, soft, nontender, nondistended, no guarding, no rebound  Extremities: LE edema  malodorous diarrhea    MEDICATIONS:  MEDICATIONS  (STANDING):  amLODIPine   Tablet 10 milliGRAM(s) Oral daily  chlorhexidine 0.12% Liquid 15 milliLiter(s) Oral Mucosa every 12 hours  chlorhexidine 2% Cloths 1 Application(s) Topical <User Schedule>  enoxaparin Injectable 90 milliGRAM(s) SubCutaneous every 12 hours  ipratropium 17 MICROgram(s) HFA Inhaler 2 Puff(s) Inhalation every 6 hours  losartan 50 milliGRAM(s) Oral daily  nystatin Cream 1 Application(s) Topical two times a day  pantoprazole   Suspension 40 milliGRAM(s) Oral two times a day  vitamin A & D Ointment 1 Application(s) Topical two times a day      LABS: All Labs Reviewed:                        8.0    8.03  )-----------( 238      ( 28 Nov 2024 07:23 )             25.4     11-28    145  |  114[H]  |  15  ----------------------------<  93  3.7   |  23  |  0.7    Ca    9.7      28 Nov 2024 07:23  Mg     1.8     11-28    TPro  5.0[L]  /  Alb  2.8[L]  /  TBili  0.3  /  DBili  x   /  AST  8   /  ALT  6   /  AlkPhos  79  11-28          Blood Culture:     Radiology: reviewed

## 2024-11-28 NOTE — PROGRESS NOTE ADULT - ASSESSMENT
IMPRESSION:    Acute hypoxemic respiratory failure SP trach and PEG   Left lung atelectasis SP multiple bronchoscopies   Septic shock - resolved  UTI  Suspected colon mass  Severe gastric distension w/ pneumatosis along posterior gastric wall  Possible aspiration / LLL consolidation  Thrombocytopenia   hypernatremia  gastric ulcer   HAGMA  NOLA non oliguric resolved  Afib on Xarelto  HO HTN  HO ESBL Proteus    PLAN:    CNS:  Pain control as needed .     HEENT: Oral care. Trach care     PULMONARY: Vent changes;  Wean o2 as tolerated.  40%,  PEEP to 8.  Keep O2 sat 92-96%.  Pulmonary toilet.  CXR noted, PS as tolerated 6 h     CARDIOVASCULAR: Avoid overload.     GI: GI prophylaxis.  Tube feeding today.  Bowel regimen.      RENAL:  Follow up lytes. Correct as needed.    INFECTIOUS DISEASE: SP Meropenem course.  Monitor VS     HEMATOLOGICAL: Therapeutic AC.  Monitor CBC    ENDOCRINE:  Follow up FS. Insulin protocol if needed.    MUSCULOSKELETAL: Bed chair position.  Off loading    Full code.   Vent unit  case discussed with team

## 2024-11-28 NOTE — PROGRESS NOTE ADULT - ASSESSMENT
73-year-old female PMH A-fib on xarelto, HTN, s/p cholecystectomy, congenital solitary kidney presents from NH to the ED for evaluation of weakness, decreased PO intake and abdominal distension.     Admitted to ICU for septic shock requiring pressors 2/2 ESBL proteus (UTI vs. GI source) complicated by gastric distension w/ pneumatosis, NOLA 2/2 ATN, hypernatremia. s/p NGT decompression (-3.1 L feculent output) in ED. Intubated 11/6 for AHRF. EGD 11/8 w/ multiple ulcers, largest 10cm, path +gastritis. Now with new R colon/cecal mass pending colonoscopy when more stable.   Extubated 11/13 to AVAPs, weaned to HFNC however transitioned back to BiPAP after desaturation. Patient was weak and unable to clear secretions despite non-invasive measures. Reintubated 11/18 PM, s/p trach/PEJ 11/19 with thoracic sx.    # Norovirus Diarrhea- GI PCR on 11/23 and 1/24- positive x 2 , GI PCR ,  on contact isolation , supportive tx.  - stool cdiff on 11/23 neg  -11/26, 27, 28- still with persistent watery diarrhea noted in dignishield     #AHRF 2/2 septic shock due to ESBL proteus (UTI Vs. GI source) s/p intubation x2, s/p trach 11/19  #HAGMA 2/2 lactic acidosis and uremia - resolved   #NOLA likely ATN 2/2 septic shock - resolved   #Possible aspiration  #Congenital solitary kidney  - [facility], s/p trach 11/19  - UCx +ESBL proteus, BCX (-), trach culture with resp rosalva, sputum cx 11/12 resp rosalva  - off pressors, off sedation  - s/p abx (finished 11/13)  - c/w mechanical ventilation, SBT as tolerated   - pain management,  chest PT/MDI q6h  - post bronch on 11/20- neg , f/u bronch cx/gram stain (11/18-neg , 11/19- neg )      #Dysphagia 2/2 critical illness s/p PEJ  - PEJ w/ thoracic 11/19- tolerating feedings well      #R colon/cecal mass on CT A/P  #Multiple gastric ulcers iso chronic gastritis   #Abdominal distension w/ pneumatosis - resolved   #Diarrhea - recurrent on 11/23- obtained GI PCR  - CT A/P on admission with severe gastric distension with new pneumatosis along posterior gastric wall suspicious for ischemia, foci of free intraperitoneal air ; suspicious portal venous gas; thick bladder wall (cystitis vs colovesicular fistula)   - s/p EGD 11/8 with multiple ulcers, erosive gastritis:  EGD path: chronic gastritis (-) H pylori, no malignancy   - repeated CT A/P with PO contrast 11.8 w/ suspicious hepatic flexure narrowing and lobulated soft tissue density in R colon/cecum.   -  Cdiff (-)  -  PPI BID   - colonoscopy when more stable as per GI    #Hypernatremia- corrected . free water via peg, daily BMP  # Hypomagnesemia - supplemented        #Sacral DTI - offloading, wound care, pressure injury prevention    #Afib- resumed on therapeutic lovenox tx.    #HTN - amlodipine 10mg qd PO, losartan 50mg qd PO (home meds)    DVT ppx:  on lovenox tx.    Diet: NPO w/ PEJ feeding  GI ppx/Bowel regimen: PPI BID    Code status: full code    Family discussion: current patient's condition and medical plan of care d/w family by bedside    #Progress Note Handoff: monitor for resolution of diarrhea,  electrolytes supplement PRN,  vent management as per Pulm. team    Disposition: SNF after diarrhea improves.

## 2024-11-29 LAB
ALBUMIN SERPL ELPH-MCNC: 2.8 G/DL — LOW (ref 3.5–5.2)
ALP SERPL-CCNC: 79 U/L — SIGNIFICANT CHANGE UP (ref 30–115)
ALT FLD-CCNC: 7 U/L — SIGNIFICANT CHANGE UP (ref 0–41)
ANION GAP SERPL CALC-SCNC: 9 MMOL/L — SIGNIFICANT CHANGE UP (ref 7–14)
AST SERPL-CCNC: 9 U/L — SIGNIFICANT CHANGE UP (ref 0–41)
BASOPHILS # BLD AUTO: 0.06 K/UL — SIGNIFICANT CHANGE UP (ref 0–0.2)
BASOPHILS NFR BLD AUTO: 0.8 % — SIGNIFICANT CHANGE UP (ref 0–1)
BILIRUB SERPL-MCNC: 0.4 MG/DL — SIGNIFICANT CHANGE UP (ref 0.2–1.2)
BUN SERPL-MCNC: 16 MG/DL — SIGNIFICANT CHANGE UP (ref 10–20)
CALCIUM SERPL-MCNC: 9.7 MG/DL — SIGNIFICANT CHANGE UP (ref 8.4–10.5)
CHLORIDE SERPL-SCNC: 114 MMOL/L — HIGH (ref 98–110)
CO2 SERPL-SCNC: 21 MMOL/L — SIGNIFICANT CHANGE UP (ref 17–32)
CREAT SERPL-MCNC: 0.6 MG/DL — LOW (ref 0.7–1.5)
EGFR: 95 ML/MIN/1.73M2 — SIGNIFICANT CHANGE UP
EOSINOPHIL # BLD AUTO: 0.36 K/UL — SIGNIFICANT CHANGE UP (ref 0–0.7)
EOSINOPHIL NFR BLD AUTO: 4.7 % — SIGNIFICANT CHANGE UP (ref 0–8)
GLUCOSE BLDC GLUCOMTR-MCNC: 112 MG/DL — HIGH (ref 70–99)
GLUCOSE BLDC GLUCOMTR-MCNC: 121 MG/DL — HIGH (ref 70–99)
GLUCOSE SERPL-MCNC: 91 MG/DL — SIGNIFICANT CHANGE UP (ref 70–99)
HCT VFR BLD CALC: 25 % — LOW (ref 37–47)
HGB BLD-MCNC: 7.9 G/DL — LOW (ref 12–16)
IMM GRANULOCYTES NFR BLD AUTO: 0.8 % — HIGH (ref 0.1–0.3)
LYMPHOCYTES # BLD AUTO: 1.81 K/UL — SIGNIFICANT CHANGE UP (ref 1.2–3.4)
LYMPHOCYTES # BLD AUTO: 23.6 % — SIGNIFICANT CHANGE UP (ref 20.5–51.1)
MAGNESIUM SERPL-MCNC: 1.5 MG/DL — LOW (ref 1.8–2.4)
MCHC RBC-ENTMCNC: 29.9 PG — SIGNIFICANT CHANGE UP (ref 27–31)
MCHC RBC-ENTMCNC: 31.6 G/DL — LOW (ref 32–37)
MCV RBC AUTO: 94.7 FL — SIGNIFICANT CHANGE UP (ref 81–99)
MONOCYTES # BLD AUTO: 0.62 K/UL — HIGH (ref 0.1–0.6)
MONOCYTES NFR BLD AUTO: 8.1 % — SIGNIFICANT CHANGE UP (ref 1.7–9.3)
NEUTROPHILS # BLD AUTO: 4.77 K/UL — SIGNIFICANT CHANGE UP (ref 1.4–6.5)
NEUTROPHILS NFR BLD AUTO: 62 % — SIGNIFICANT CHANGE UP (ref 42.2–75.2)
NRBC # BLD: 0 /100 WBCS — SIGNIFICANT CHANGE UP (ref 0–0)
PLATELET # BLD AUTO: 162 K/UL — SIGNIFICANT CHANGE UP (ref 130–400)
PMV BLD: 11.9 FL — HIGH (ref 7.4–10.4)
POTASSIUM SERPL-MCNC: 3.5 MMOL/L — SIGNIFICANT CHANGE UP (ref 3.5–5)
POTASSIUM SERPL-SCNC: 3.5 MMOL/L — SIGNIFICANT CHANGE UP (ref 3.5–5)
PROT SERPL-MCNC: 5.1 G/DL — LOW (ref 6–8)
RBC # BLD: 2.64 M/UL — LOW (ref 4.2–5.4)
RBC # FLD: 13.9 % — SIGNIFICANT CHANGE UP (ref 11.5–14.5)
SODIUM SERPL-SCNC: 144 MMOL/L — SIGNIFICANT CHANGE UP (ref 135–146)
WBC # BLD: 7.68 K/UL — SIGNIFICANT CHANGE UP (ref 4.8–10.8)
WBC # FLD AUTO: 7.68 K/UL — SIGNIFICANT CHANGE UP (ref 4.8–10.8)

## 2024-11-29 PROCEDURE — 99232 SBSQ HOSP IP/OBS MODERATE 35: CPT

## 2024-11-29 RX ORDER — POTASSIUM CHLORIDE 600 MG/1
40 TABLET, EXTENDED RELEASE ORAL ONCE
Refills: 0 | Status: COMPLETED | OUTPATIENT
Start: 2024-11-29 | End: 2024-11-29

## 2024-11-29 RX ADMIN — POTASSIUM CHLORIDE 40 MILLIEQUIVALENT(S): 600 TABLET, EXTENDED RELEASE ORAL at 10:19

## 2024-11-29 RX ADMIN — PANTOPRAZOLE SODIUM 40 MILLIGRAM(S): 40 TABLET, DELAYED RELEASE ORAL at 18:32

## 2024-11-29 RX ADMIN — Medication 1 APPLICATION(S): at 06:05

## 2024-11-29 RX ADMIN — Medication 25 GRAM(S): at 10:19

## 2024-11-29 RX ADMIN — CHLORHEXIDINE GLUCONATE 15 MILLILITER(S): 1.2 RINSE ORAL at 06:05

## 2024-11-29 RX ADMIN — Medication 1 APPLICATION(S): at 18:31

## 2024-11-29 RX ADMIN — NYSTATIN 1 APPLICATION(S): 100000 POWDER TOPICAL at 18:35

## 2024-11-29 RX ADMIN — LOSARTAN POTASSIUM 50 MILLIGRAM(S): 100 TABLET, FILM COATED ORAL at 06:05

## 2024-11-29 RX ADMIN — NYSTATIN 1 APPLICATION(S): 100000 POWDER TOPICAL at 06:06

## 2024-11-29 RX ADMIN — AMLODIPINE BESYLATE 10 MILLIGRAM(S): 10 TABLET ORAL at 06:04

## 2024-11-29 RX ADMIN — Medication 2 PUFF(S): at 19:53

## 2024-11-29 RX ADMIN — Medication 25 GRAM(S): at 12:00

## 2024-11-29 RX ADMIN — CHLORHEXIDINE GLUCONATE 15 MILLILITER(S): 1.2 RINSE ORAL at 18:32

## 2024-11-29 RX ADMIN — ENOXAPARIN SODIUM 90 MILLIGRAM(S): 30 INJECTION SUBCUTANEOUS at 10:19

## 2024-11-29 RX ADMIN — CHLORHEXIDINE GLUCONATE 1 APPLICATION(S): 1.2 RINSE ORAL at 06:05

## 2024-11-29 RX ADMIN — PANTOPRAZOLE SODIUM 40 MILLIGRAM(S): 40 TABLET, DELAYED RELEASE ORAL at 06:05

## 2024-11-29 RX ADMIN — ENOXAPARIN SODIUM 90 MILLIGRAM(S): 30 INJECTION SUBCUTANEOUS at 21:12

## 2024-11-29 NOTE — PROGRESS NOTE ADULT - ATTENDING COMMENTS
73-year-old female PMH A-fib on xarelto, HTN, s/p cholecystectomy, congenital solitary kidney presents from NH to the ED for evaluation of weakness, decreased PO intake and abdominal distension.     Admitted to ICU for septic shock requiring pressors 2/2 ESBL proteus (UTI vs. GI source) complicated by gastric distension w/ pneumatosis, NOLA 2/2 ATN, hypernatremia. s/p NGT decompression (-3.1 L feculent output) in ED. Intubated 11/6 for AHRF. EGD 11/8 w/ multiple ulcers, largest 10cm, path +gastritis. Now with new R colon/cecal mass pending colonoscopy when more stable.   Extubated 11/13 to AVAPs, weaned to HFNC however transitioned back to BiPAP after desaturation. Patient was weak and unable to clear secretions despite non-invasive measures. Reintubated 11/18 PM, s/p trach/PEJ 11/19 with thoracic sx.    # Norovirus Diarrhea- GI PCR on 11/23 and 1/24- positive x 2 , GI PCR ,  on contact isolation , supportive tx.  - stool cdiff on 11/23 neg  -11/29- still with persistent watery diarrhea noted in dignishield     #AHRF 2/2 septic shock due to ESBL proteus (UTI Vs. GI source) s/p intubation x2, s/p trach 11/19  #HAGMA 2/2 lactic acidosis and uremia - resolved   #NOLA likely ATN 2/2 septic shock - resolved   #Possible aspiration  #Congenital solitary kidney  - [facility]/18, s/p trach 11/19  - UCx +ESBL proteus, BCX (-), trach culture with resp rosalva, sputum cx 11/12 resp rosalva  - off pressors, off sedation  - s/p abx (finished 11/13)  - c/w mechanical ventilation, SBT as tolerated   - pain management,  chest PT/MDI q6h  - post bronch on 11/20- neg , f/u bronch cx/gram stain (11/18-neg , 11/19- neg )      #Dysphagia 2/2 critical illness s/p PEJ  - PEJ w/ thoracic 11/19- tolerating feedings well  J port clogged (11/29). Try Clog Zapper. Or call Thoracic Surgery.    #R colon/cecal mass on CT A/P  #Multiple gastric ulcers iso chronic gastritis   #Abdominal distension w/ pneumatosis - resolved   #Diarrhea - recurrent on 11/23- obtained GI PCR  - CT A/P on admission with severe gastric distension with new pneumatosis along posterior gastric wall suspicious for ischemia, foci of free intraperitoneal air ; suspicious portal venous gas; thick bladder wall (cystitis vs colovesicular fistula)   - s/p EGD 11/8 with multiple ulcers, erosive gastritis:  EGD path: chronic gastritis (-) H pylori, no malignancy   - repeated CT A/P with PO contrast 11.8 w/ suspicious hepatic flexure narrowing and lobulated soft tissue density in R colon/cecum.   -  Cdiff (-)  -  PPI BID   - colonoscopy when more stable as per GI    #Hypernatremia- corrected . free water via peg, daily BMP  # Hypomagnesemia - supplemented        #Sacral DTI - offloading, wound care, pressure injury prevention    #Afib- resumed on therapeutic lovenox tx.    #HTN - amlodipine 10mg qd PO, losartan 50mg qd PO (home meds)    DVT ppx:  on lovenox tx.    Diet: NPO w/ PEJ feeding  GI ppx/Bowel regimen: PPI BID    Code status: full code    Family discussion: current patient's condition and medical plan of care d/w family by bedside    #Progress Note Handoff: monitor for resolution of diarrhea,  electrolytes supplement PRN,  vent management as per Pulm. team    Disposition: SNF after diarrhea improves.   J port clogged - f/u surgery

## 2024-11-29 NOTE — PROGRESS NOTE ADULT - SUBJECTIVE AND OBJECTIVE BOX
Over Night Events: events noted, vent dependant, afebrile    PHYSICAL EXAM    ICU Vital Signs Last 24 Hrs  T(C): 37.1 (29 Nov 2024 04:26), Max: 37.1 (29 Nov 2024 04:26)  T(F): 98.7 (29 Nov 2024 04:26), Max: 98.7 (29 Nov 2024 04:26)  HR: 65 (29 Nov 2024 04:26) (56 - 76)  BP: 115/71 (29 Nov 2024 04:26) (115/71 - 127/63)  BP(mean): 86 (29 Nov 2024 04:26) (85 - 86)  RR: 20 (29 Nov 2024 04:26) (19 - 20)  SpO2: 100% (29 Nov 2024 04:26) (98% - 100%)    O2 Parameters below as of 29 Nov 2024 04:26  Patient On (Oxygen Delivery Method): ventilator            General: ill looking  trac  Lungs: Bilateral rhonchi  Cardiovascular: EARLENE 2.6  Abdomen: Soft, Positive BS  Follows commands      11-27-24 @ 07:01  -  11-28-24 @ 07:00  --------------------------------------------------------  IN:    Enteral Tube Flush: 100 mL    Jevity 1.2: 450 mL  Total IN: 550 mL    OUT:    Voided (mL): 1450 mL  Total OUT: 1450 mL    Total NET: -900 mL      11-28-24 @ 07:01  -  11-29-24 @ 05:58  --------------------------------------------------------  IN:    Enteral Tube Flush: 500 mL    Jevity 1.2: 1200 mL  Total IN: 1700 mL    OUT:    Voided (mL): 200 mL  Total OUT: 200 mL    Total NET: 1500 mL          LABS:                          8.0    8.03  )-----------( 238      ( 28 Nov 2024 07:23 )             25.4                                               11-28    145  |  114[H]  |  15  ----------------------------<  93  3.7   |  23  |  0.7    Ca    9.7      28 Nov 2024 07:23  Mg     1.8     11-28    TPro  5.0[L]  /  Alb  2.8[L]  /  TBili  0.3  /  DBili  x   /  AST  8   /  ALT  6   /  AlkPhos  79  11-28                                             Urinalysis Basic - ( 28 Nov 2024 07:23 )    Color: x / Appearance: x / SG: x / pH: x  Gluc: 93 mg/dL / Ketone: x  / Bili: x / Urobili: x   Blood: x / Protein: x / Nitrite: x   Leuk Esterase: x / RBC: x / WBC x   Sq Epi: x / Non Sq Epi: x / Bacteria: x                                                  LIVER FUNCTIONS - ( 28 Nov 2024 07:23 )  Alb: 2.8 g/dL / Pro: 5.0 g/dL / ALK PHOS: 79 U/L / ALT: 6 U/L / AST: 8 U/L / GGT: x                                                                                               Mode: AC/ CMV (Assist Control/ Continuous Mandatory Ventilation)  RR (machine): 14  TV (machine): 360  FiO2: 40  PEEP: 8  ITime: 0.8  MAP: 12  PIP: 14                                          MEDICATIONS  (STANDING):  amLODIPine   Tablet 10 milliGRAM(s) Oral daily  chlorhexidine 0.12% Liquid 15 milliLiter(s) Oral Mucosa every 12 hours  chlorhexidine 2% Cloths 1 Application(s) Topical <User Schedule>  enoxaparin Injectable 90 milliGRAM(s) SubCutaneous every 12 hours  ipratropium 17 MICROgram(s) HFA Inhaler 2 Puff(s) Inhalation every 6 hours  losartan 50 milliGRAM(s) Oral daily  nystatin Cream 1 Application(s) Topical two times a day  pantoprazole   Suspension 40 milliGRAM(s) Oral two times a day  vitamin A & D Ointment 1 Application(s) Topical two times a day    MEDICATIONS  (PRN):  acetaminophen     Tablet .. 650 milliGRAM(s) Oral every 6 hours PRN Temp greater or equal to 38C (100.4F)  albuterol    90 MICROgram(s) HFA Inhaler 2 Puff(s) Inhalation every 6 hours PRN Shortness of Breath and/or Wheezing

## 2024-11-29 NOTE — CHART NOTE - NSCHARTNOTEFT_GEN_A_CORE
Patient seen and examined at bedside. Patients tracheostomy sutures were removed ( 4 sutures). Please recall thoracic surgery as needed.     x Green Team [id number]

## 2024-11-29 NOTE — PROGRESS NOTE ADULT - ASSESSMENT
IMPRESSION:    Acute hypoxemic respiratory failure SP trach and PEG   Left lung atelectasis SP multiple bronchoscopies   Septic shock - resolved  UTI  Suspected colon mass  Severe gastric distension w/ pneumatosis along posterior gastric wall resolved  Possible aspiration / LLL consolidation  Thrombocytopenia   hypernatremia  gastric ulcer   HAGMA  NOLA resolved  Afib on Xarelto  HO HTN  HO ESBL Proteus    PLAN:    CNS:  Pain control as needed .     HEENT: Oral care. Trach care     PULMONARY: Vent changes;  Wean o2 as tolerated.  40%,  PEEP to 8.  Keep O2 sat 92-96%.  Pulmonary toilet.  CXR noted, PS as tolerated 6 h     CARDIOVASCULAR: Avoid overload.     GI: GI prophylaxis.  Tube feeding today.  Bowel regimen.      RENAL:  Follow up lytes. Correct as needed.    INFECTIOUS DISEASE: SP Meropenem course.  Monitor VS     HEMATOLOGICAL: Therapeutic AC.  Monitor CBC    ENDOCRINE:  Follow up FS. Insulin protocol if needed.    MUSCULOSKELETAL: Bed chair position.  Off loading    Full code.   Vent unit  case discussed with team

## 2024-11-29 NOTE — PROGRESS NOTE ADULT - SUBJECTIVE AND OBJECTIVE BOX
SUBJECTIVE/OVERNIGHT EVENTS  Today is hospital day 26d. This morning patient was seen and examined at bedside, resting comfortably in bed. No acute or major events overnight.      CODE STATUS:      PMH:  HTN (hypertension)    Diabetes mellitus    Obesity    Gastroesophageal reflux disease    Active asthma    Generalized OA    Afib          PSH:  H/O hernia repair    History of cholecystectomy          MEDICATIONS  STANDING MEDICATIONS  amLODIPine   Tablet 10 milliGRAM(s) Oral daily  chlorhexidine 0.12% Liquid 15 milliLiter(s) Oral Mucosa every 12 hours  chlorhexidine 2% Cloths 1 Application(s) Topical <User Schedule>  enoxaparin Injectable 90 milliGRAM(s) SubCutaneous every 12 hours  ipratropium 17 MICROgram(s) HFA Inhaler 2 Puff(s) Inhalation every 6 hours  losartan 50 milliGRAM(s) Oral daily  magnesium sulfate  IVPB 2 Gram(s) IV Intermittent every 2 hours  nystatin Cream 1 Application(s) Topical two times a day  pantoprazole   Suspension 40 milliGRAM(s) Oral two times a day  vitamin A & D Ointment 1 Application(s) Topical two times a day    PRN MEDICATIONS  acetaminophen     Tablet .. 650 milliGRAM(s) Oral every 6 hours PRN  albuterol    90 MICROgram(s) HFA Inhaler 2 Puff(s) Inhalation every 6 hours PRN    VITALS  T(F): 97.3 (11-29-24 @ 12:00), Max: 98.7 (11-29-24 @ 04:26)  HR: 71 (11-29-24 @ 12:00) (56 - 71)  BP: 129/74 (11-29-24 @ 12:00) (115/71 - 129/74)  RR: 20 (11-29-24 @ 04:26) (19 - 20)  SpO2: 100% (11-29-24 @ 12:00) (100% - 100%)  POCT Blood Glucose.: 121 mg/dL (11-29-24 @ 05:57)  POCT Blood Glucose.: 110 mg/dL (11-28-24 @ 23:42)    PHYSICAL EXAM  GENERAL  ( X ) NAD, lying in bed comfortably     (  ) obtunded     (  ) lethargic     (  ) somnolent    HEAD  ( X ) Atraumatic     (  ) hematoma     (  ) laceration (specify location:       )     NECK  ( X ) Supple     (  ) neck stiffness     (  ) nuchal rigidity     (  )  no JVD     (  ) JVD present ( -- cm)   ( X ) trach present, no erythema or secretions     HEART  Rate -->  ( X ) normal rate    (  ) bradycardic    (  ) tachycardic  Rhythm -->  ( X ) regular    (  ) regularly irregular    (  ) irregularly irregular  Murmurs -->  ( X ) normal s1/s2    (  ) systolic murmur    (  ) diastolic murmur    (  ) continuous murmur     (  ) S3 present    (  ) S4 present    LUNGS  Tracheostomy  ( X )Unlabored respirations     (  ) tachypnea  ( X ) B/L air entry     (  ) decreased breath sounds in:  (location     )    ( x ) no adventitious sound     (  ) crackles     (  ) wheezing      (  ) rhonchi      (specify location:       )  (  ) chest wall tenderness (specify location:       )    ABDOMEN  PEG tube  ( X ) Soft     (  ) tense   |   ( X ) nondistended     (  ) distended   |   ( X ) +BS     (  ) hypoactive bowel sounds     (  ) hyperactive bowel sounds  ( X ) nontender     (  ) RUQ tenderness     (  ) RLQ tenderness     (  ) LLQ tenderness     (  ) epigastric tenderness     (  ) diffuse tenderness  (  ) Splenomegaly      (  ) Hepatomegaly      (  ) Jaundice     (  ) ecchymosis       EXTREMITIES  ( X ) Normal     (  ) Rash     (  ) ecchymosis     (  ) varicose veins      (  ) pitting edema     (  ) non-pitting edema   (  ) ulceration     (  ) gangrene:     (location:     )    NERVOUS SYSTEM  (  ) A&Ox3     (  ) confused     (  ) lethargic  CN II-XII:     (  ) Intact     (  ) focal deficits  (Specify:     )   Upper extremities:     (  ) strength X/5     (  ) focal deficit (specify:    )  Lower extremities:     (  ) strength  X/5    (  ) focal deficit (specify:    )    SKIN  ( X ) No rashes or lesions     (  ) maculopapular rash     (  ) pustules     (  ) vesicles     (  ) ulcer     (  ) ecchymosis     (specify location:     )      LABS             7.9    7.68  )-----------( 162      ( 11-29-24 @ 08:16 )             25.0     144  |  114  |  16  -------------------------<  91   11-29-24 @ 08:16  3.5  |  21  |  0.6    Ca      9.7     11-29-24 @ 08:16  Mg     1.5     11-29-24 @ 08:16    TPro  5.1  /  Alb  2.8  /  TBili  0.4  /  DBili  x   /  AST  9   /  ALT  7   /  AlkPhos  79  /  GGT  x     11-29-24 @ 08:16        Urinalysis Basic - ( 29 Nov 2024 08:16 )    Color: x / Appearance: x / SG: x / pH: x  Gluc: 91 mg/dL / Ketone: x  / Bili: x / Urobili: x   Blood: x / Protein: x / Nitrite: x   Leuk Esterase: x / RBC: x / WBC x   Sq Epi: x / Non Sq Epi: x / Bacteria: x

## 2024-11-30 LAB
ALBUMIN SERPL ELPH-MCNC: 2.9 G/DL — LOW (ref 3.5–5.2)
ALP SERPL-CCNC: 87 U/L — SIGNIFICANT CHANGE UP (ref 30–115)
ALT FLD-CCNC: 8 U/L — SIGNIFICANT CHANGE UP (ref 0–41)
ANION GAP SERPL CALC-SCNC: 9 MMOL/L — SIGNIFICANT CHANGE UP (ref 7–14)
AST SERPL-CCNC: 8 U/L — SIGNIFICANT CHANGE UP (ref 0–41)
BASOPHILS # BLD AUTO: 0.07 K/UL — SIGNIFICANT CHANGE UP (ref 0–0.2)
BASOPHILS NFR BLD AUTO: 0.8 % — SIGNIFICANT CHANGE UP (ref 0–1)
BILIRUB SERPL-MCNC: 0.4 MG/DL — SIGNIFICANT CHANGE UP (ref 0.2–1.2)
BUN SERPL-MCNC: 14 MG/DL — SIGNIFICANT CHANGE UP (ref 10–20)
CALCIUM SERPL-MCNC: 9.6 MG/DL — SIGNIFICANT CHANGE UP (ref 8.4–10.5)
CHLORIDE SERPL-SCNC: 114 MMOL/L — HIGH (ref 98–110)
CO2 SERPL-SCNC: 23 MMOL/L — SIGNIFICANT CHANGE UP (ref 17–32)
CREAT SERPL-MCNC: 0.6 MG/DL — LOW (ref 0.7–1.5)
EGFR: 95 ML/MIN/1.73M2 — SIGNIFICANT CHANGE UP
EOSINOPHIL # BLD AUTO: 0.34 K/UL — SIGNIFICANT CHANGE UP (ref 0–0.7)
EOSINOPHIL NFR BLD AUTO: 3.9 % — SIGNIFICANT CHANGE UP (ref 0–8)
GLUCOSE BLDC GLUCOMTR-MCNC: 126 MG/DL — HIGH (ref 70–99)
GLUCOSE BLDC GLUCOMTR-MCNC: 87 MG/DL — SIGNIFICANT CHANGE UP (ref 70–99)
GLUCOSE BLDC GLUCOMTR-MCNC: 96 MG/DL — SIGNIFICANT CHANGE UP (ref 70–99)
GLUCOSE SERPL-MCNC: 87 MG/DL — SIGNIFICANT CHANGE UP (ref 70–99)
HCT VFR BLD CALC: 25.1 % — LOW (ref 37–47)
HCT VFR BLD CALC: 25.7 % — LOW (ref 37–47)
HGB BLD-MCNC: 8 G/DL — LOW (ref 12–16)
HGB BLD-MCNC: 8.1 G/DL — LOW (ref 12–16)
IMM GRANULOCYTES NFR BLD AUTO: 0.8 % — HIGH (ref 0.1–0.3)
LYMPHOCYTES # BLD AUTO: 1.7 K/UL — SIGNIFICANT CHANGE UP (ref 1.2–3.4)
LYMPHOCYTES # BLD AUTO: 19.7 % — LOW (ref 20.5–51.1)
MAGNESIUM SERPL-MCNC: 2.9 MG/DL — HIGH (ref 1.8–2.4)
MCHC RBC-ENTMCNC: 29.8 PG — SIGNIFICANT CHANGE UP (ref 27–31)
MCHC RBC-ENTMCNC: 30 PG — SIGNIFICANT CHANGE UP (ref 27–31)
MCHC RBC-ENTMCNC: 31.5 G/DL — LOW (ref 32–37)
MCHC RBC-ENTMCNC: 31.9 G/DL — LOW (ref 32–37)
MCV RBC AUTO: 94 FL — SIGNIFICANT CHANGE UP (ref 81–99)
MCV RBC AUTO: 94.5 FL — SIGNIFICANT CHANGE UP (ref 81–99)
MONOCYTES # BLD AUTO: 0.6 K/UL — SIGNIFICANT CHANGE UP (ref 0.1–0.6)
MONOCYTES NFR BLD AUTO: 7 % — SIGNIFICANT CHANGE UP (ref 1.7–9.3)
NEUTROPHILS # BLD AUTO: 5.84 K/UL — SIGNIFICANT CHANGE UP (ref 1.4–6.5)
NEUTROPHILS NFR BLD AUTO: 67.8 % — SIGNIFICANT CHANGE UP (ref 42.2–75.2)
NRBC # BLD: 0 /100 WBCS — SIGNIFICANT CHANGE UP (ref 0–0)
NRBC # BLD: 0 /100 WBCS — SIGNIFICANT CHANGE UP (ref 0–0)
PLATELET # BLD AUTO: 219 K/UL — SIGNIFICANT CHANGE UP (ref 130–400)
PLATELET # BLD AUTO: 92 K/UL — LOW (ref 130–400)
PMV BLD: 11.8 FL — HIGH (ref 7.4–10.4)
PMV BLD: 12.5 FL — HIGH (ref 7.4–10.4)
POTASSIUM SERPL-MCNC: 3.6 MMOL/L — SIGNIFICANT CHANGE UP (ref 3.5–5)
POTASSIUM SERPL-SCNC: 3.6 MMOL/L — SIGNIFICANT CHANGE UP (ref 3.5–5)
PROT SERPL-MCNC: 5.1 G/DL — LOW (ref 6–8)
RBC # BLD: 2.67 M/UL — LOW (ref 4.2–5.4)
RBC # BLD: 2.72 M/UL — LOW (ref 4.2–5.4)
RBC # FLD: 13.7 % — SIGNIFICANT CHANGE UP (ref 11.5–14.5)
RBC # FLD: 13.9 % — SIGNIFICANT CHANGE UP (ref 11.5–14.5)
SODIUM SERPL-SCNC: 146 MMOL/L — SIGNIFICANT CHANGE UP (ref 135–146)
WBC # BLD: 8.62 K/UL — SIGNIFICANT CHANGE UP (ref 4.8–10.8)
WBC # BLD: 9.47 K/UL — SIGNIFICANT CHANGE UP (ref 4.8–10.8)
WBC # FLD AUTO: 8.62 K/UL — SIGNIFICANT CHANGE UP (ref 4.8–10.8)
WBC # FLD AUTO: 9.47 K/UL — SIGNIFICANT CHANGE UP (ref 4.8–10.8)

## 2024-11-30 PROCEDURE — 71045 X-RAY EXAM CHEST 1 VIEW: CPT | Mod: 26

## 2024-11-30 PROCEDURE — 99232 SBSQ HOSP IP/OBS MODERATE 35: CPT

## 2024-11-30 PROCEDURE — 99233 SBSQ HOSP IP/OBS HIGH 50: CPT

## 2024-11-30 RX ORDER — ENOXAPARIN SODIUM 30 MG/.3ML
40 INJECTION SUBCUTANEOUS EVERY 24 HOURS
Refills: 0 | Status: DISCONTINUED | OUTPATIENT
Start: 2024-11-30 | End: 2024-12-01

## 2024-11-30 RX ADMIN — Medication 1 APPLICATION(S): at 05:53

## 2024-11-30 RX ADMIN — LOSARTAN POTASSIUM 50 MILLIGRAM(S): 100 TABLET, FILM COATED ORAL at 05:53

## 2024-11-30 RX ADMIN — NYSTATIN 1 APPLICATION(S): 100000 POWDER TOPICAL at 05:53

## 2024-11-30 RX ADMIN — NYSTATIN 1 APPLICATION(S): 100000 POWDER TOPICAL at 17:20

## 2024-11-30 RX ADMIN — PANTOPRAZOLE SODIUM 40 MILLIGRAM(S): 40 TABLET, DELAYED RELEASE ORAL at 05:52

## 2024-11-30 RX ADMIN — Medication 1 APPLICATION(S): at 17:21

## 2024-11-30 RX ADMIN — CHLORHEXIDINE GLUCONATE 15 MILLILITER(S): 1.2 RINSE ORAL at 17:20

## 2024-11-30 RX ADMIN — PANTOPRAZOLE SODIUM 40 MILLIGRAM(S): 40 TABLET, DELAYED RELEASE ORAL at 17:27

## 2024-11-30 RX ADMIN — AMLODIPINE BESYLATE 10 MILLIGRAM(S): 10 TABLET ORAL at 05:52

## 2024-11-30 RX ADMIN — CHLORHEXIDINE GLUCONATE 15 MILLILITER(S): 1.2 RINSE ORAL at 05:53

## 2024-11-30 RX ADMIN — CHLORHEXIDINE GLUCONATE 1 APPLICATION(S): 1.2 RINSE ORAL at 05:53

## 2024-11-30 NOTE — PROGRESS NOTE ADULT - ATTENDING COMMENTS
73-year-old female PMH A-fib on xarelto, HTN, s/p cholecystectomy, congenital solitary kidney presents from NH to the ED for evaluation of weakness, decreased PO intake and abdominal distension.     Admitted to ICU for septic shock requiring pressors 2/2 ESBL proteus (UTI vs. GI source) complicated by gastric distension w/ pneumatosis, NOLA 2/2 ATN, hypernatremia. s/p NGT decompression (-3.1 L feculent output) in ED. Intubated 11/6 for AHRF. EGD 11/8 w/ multiple ulcers, largest 10cm, path +gastritis. Now with new R colon/cecal mass pending colonoscopy when more stable.   Extubated 11/13 to AVAPs, weaned to HFNC however transitioned back to BiPAP after desaturation. Patient was weak and unable to clear secretions despite non-invasive measures. Reintubated 11/18 PM, s/p trach/PEJ 11/19 with thoracic sx.    # Norovirus Diarrhea- GI PCR on 11/23 and 1/24- positive x 2 , GI PCR ,  on contact isolation , supportive tx.  - stool cdiff on 11/23 neg  -11/29- still with persistent watery diarrhea noted in [facility]     #AHRF 2/2 septic shock due to ESBL proteus (UTI Vs. GI source) s/p intubation x2, s/p trach 11/19  #HAGMA 2/2 lactic acidosis and uremia - resolved   #NOLA likely ATN 2/2 septic shock - resolved   #Possible aspiration  #Congenital solitary kidney  - [facility]18, s/p trach 11/19  - UCx +ESBL proteus, BCX (-), trach culture with resp rosalva, sputum cx 11/12 resp rosalva  - off pressors, off sedation  - s/p abx (finished 11/13)  - c/w mechanical ventilation, SBT as tolerated   - pain management,  chest PT/MDI q6h  - post bronch on 11/20- neg , f/u bronch cx/gram stain (11/18-neg , 11/19- neg )      #Dysphagia 2/2 critical illness s/p PEJ  - PEJ w/ thoracic 11/19- tolerating feedings well  J port clogged (11/29). Try Clog Zapper. Or call Thoracic Surgery.    #R colon/cecal mass on CT A/P  #Multiple gastric ulcers iso chronic gastritis   #Abdominal distension w/ pneumatosis - resolved   #Diarrhea - recurrent on 11/23- obtained GI PCR  - CT A/P on admission with severe gastric distension with new pneumatosis along posterior gastric wall suspicious for ischemia, foci of free intraperitoneal air ; suspicious portal venous gas; thick bladder wall (cystitis vs colovesicular fistula)   - s/p EGD 11/8 with multiple ulcers, erosive gastritis:  EGD path: chronic gastritis (-) H pylori, no malignancy   - repeated CT A/P with PO contrast 11.8 w/ suspicious hepatic flexure narrowing and lobulated soft tissue density in R colon/cecum.   -  Cdiff (-)  -  PPI BID   - colonoscopy when more stable as per GI    #Hypernatremia- corrected . free water via peg, daily BMP  # Hypomagnesemia - supplemented        #Sacral DTI - offloading, wound care, pressure injury prevention    #Afib- resumed on therapeutic lovenox tx.    #HTN - amlodipine 10mg qd PO, losartan 50mg qd PO (home meds)    DVT ppx:  on lovenox tx.    Diet: NPO w/ PEJ feeding  GI ppx/Bowel regimen: PPI BID    Code status: full code    Family discussion: current patient's condition and medical plan of care d/w family by bedside    #Progress Note Handoff: monitor for resolution of diarrhea,  electrolytes supplement PRN,  vent management as per Pulm. team    Disposition: SNF after diarrhea improves.   J port clogged - f/u surgery .  bleeding at trach site. s/p surgicel by surgery 11/30. hold therapeutic lovenox. DVTppx dose for now.

## 2024-11-30 NOTE — PROGRESS NOTE ADULT - ASSESSMENT
IMPRESSION:    Acute hypoxemic respiratory failure SP trach and PEG   Left lung atelectasis SP multiple bronchoscopies   Septic shock - resolved  UTI  Suspected colon mass  Severe gastric distension w/ pneumatosis along posterior gastric wall resolved  Possible aspiration / LLL consolidation  Thrombocytopenia   hypernatremia  gastric ulcer   HAGMA  NOLA resolved  Afib on Xarelto  HO HTN  HO ESBL Proteus    PLAN:    CNS:  Pain control as needed .     HEENT: Oral care. Trach care     PULMONARY: Vent changes;  Wean o2 as tolerated.  40%,  PEEP to 8.  Keep O2 sat 92-96%.  Pulmonary toilet.  CXR noted, CT sx noted     CARDIOVASCULAR: Avoid overload.     GI: GI prophylaxis.  Tube feeding today.  Bowel regimen.      RENAL:  Follow up lytes. Correct as needed.    INFECTIOUS DISEASE: SP Meropenem course.  Monitor VS     HEMATOLOGICAL: Hold Therapeutic AC.  Monitor CBC, plat    ENDOCRINE:  Follow up FS. Insulin protocol if needed.    MUSCULOSKELETAL: Bed chair position.  Off loading    Full code.   Vent unit  case discussed with team

## 2024-11-30 NOTE — PROGRESS NOTE ADULT - SUBJECTIVE AND OBJECTIVE BOX
SUBJECTIVE/OVERNIGHT EVENTS  Today is hospital day 27d. This morning patient was seen and examined at bedside, resting comfortably in bed. No acute or major events overnight.      CODE STATUS:      PMH:  HTN (hypertension)    Diabetes mellitus    Obesity    Gastroesophageal reflux disease    Active asthma    Generalized OA    Afib          PSH:  H/O hernia repair    History of cholecystectomy          MEDICATIONS  STANDING MEDICATIONS  amLODIPine   Tablet 10 milliGRAM(s) Oral daily  chlorhexidine 0.12% Liquid 15 milliLiter(s) Oral Mucosa every 12 hours  chlorhexidine 2% Cloths 1 Application(s) Topical <User Schedule>  ipratropium 17 MICROgram(s) HFA Inhaler 2 Puff(s) Inhalation every 6 hours  losartan 50 milliGRAM(s) Oral daily  nystatin Cream 1 Application(s) Topical two times a day  pantoprazole   Suspension 40 milliGRAM(s) Oral two times a day  vitamin A & D Ointment 1 Application(s) Topical two times a day    PRN MEDICATIONS  acetaminophen     Tablet .. 650 milliGRAM(s) Oral every 6 hours PRN  albuterol    90 MICROgram(s) HFA Inhaler 2 Puff(s) Inhalation every 6 hours PRN    VITALS  T(F): 97.4 (11-30-24 @ 13:00), Max: 98.2 (11-29-24 @ 21:13)  HR: 72 (11-30-24 @ 16:15) (66 - 80)  BP: 140/76 (11-30-24 @ 13:00) (132/73 - 140/76)  RR: 18 (11-30-24 @ 05:00) (18 - 18)  SpO2: 100% (11-30-24 @ 16:15) (99% - 100%)  POCT Blood Glucose.: 87 mg/dL (11-30-24 @ 11:21)  POCT Blood Glucose.: 96 mg/dL (11-30-24 @ 07:49)    PHYSICAL EXAM  GENERAL  ( X ) NAD, lying in bed comfortably     (  ) obtunded     (  ) lethargic     (  ) somnolent    HEAD  ( X ) Atraumatic     (  ) hematoma     (  ) laceration (specify location:       )     NECK  ( X ) Supple     (  ) neck stiffness     (  ) nuchal rigidity     (  )  no JVD     (  ) JVD present ( -- cm)   ( X ) trach present, no erythema or secretions     HEART  Rate -->  ( X ) normal rate    (  ) bradycardic    (  ) tachycardic  Rhythm -->  ( X ) regular    (  ) regularly irregular    (  ) irregularly irregular  Murmurs -->  ( X ) normal s1/s2    (  ) systolic murmur    (  ) diastolic murmur    (  ) continuous murmur     (  ) S3 present    (  ) S4 present    LUNGS  Tracheostomy  ( X )Unlabored respirations     (  ) tachypnea  ( X ) B/L air entry     (  ) decreased breath sounds in:  (location     )    ( x ) no adventitious sound     (  ) crackles     (  ) wheezing      (  ) rhonchi      (specify location:       )  (  ) chest wall tenderness (specify location:       )    ABDOMEN  PEG tube  ( X ) Soft     (  ) tense   |   ( X ) nondistended     (  ) distended   |   ( X ) +BS     (  ) hypoactive bowel sounds     (  ) hyperactive bowel sounds  ( X ) nontender     (  ) RUQ tenderness     (  ) RLQ tenderness     (  ) LLQ tenderness     (  ) epigastric tenderness     (  ) diffuse tenderness  (  ) Splenomegaly      (  ) Hepatomegaly      (  ) Jaundice     (  ) ecchymosis       EXTREMITIES  ( X ) Normal     (  ) Rash     (  ) ecchymosis     (  ) varicose veins      (  ) pitting edema     (  ) non-pitting edema   (  ) ulceration     (  ) gangrene:     (location:     )    NERVOUS SYSTEM  (  ) A&Ox3     (  ) confused     (  ) lethargic  CN II-XII:     (  ) Intact     (  ) focal deficits  (Specify:     )   Upper extremities:     (  ) strength X/5     (  ) focal deficit (specify:    )  Lower extremities:     (  ) strength  X/5    (  ) focal deficit (specify:    )    SKIN  ( X ) No rashes or lesions     (  ) maculopapular rash     (  ) pustules     (  ) vesicles     (  ) ulcer     (  ) ecchymosis     (specify location:     )        LABS             8.1    8.62  )-----------( 219      ( 11-30-24 @ 07:59 )             25.7     146  |  114  |  14  -------------------------<  87   11-30-24 @ 07:59  3.6  |  23  |  0.6    Ca      9.6     11-30-24 @ 07:59  Mg     2.9     11-30-24 @ 07:59    TPro  5.1  /  Alb  2.9  /  TBili  0.4  /  DBili  x   /  AST  8   /  ALT  8   /  AlkPhos  87  /  GGT  x     11-30-24 @ 07:59        Urinalysis Basic - ( 30 Nov 2024 07:59 )    Color: x / Appearance: x / SG: x / pH: x  Gluc: 87 mg/dL / Ketone: x  / Bili: x / Urobili: x   Blood: x / Protein: x / Nitrite: x   Leuk Esterase: x / RBC: x / WBC x   Sq Epi: x / Non Sq Epi: x / Bacteria: x          IMAGING

## 2024-11-30 NOTE — PROGRESS NOTE ADULT - ASSESSMENT
73-year-old female PMH A-fib on xarelto,, HTN, s/p cholecystectomy, congenital solitary kidney presents from NH to the ED for evaluation of weakness, decreased PO intake and abdominal distension.   Admitted to ICU for septic shock 2/2 ESBL proteus (UTI vs. GI source) complicated by gastric distension w/ pneumatosis, NOLA 2/2 ATN, hypernatremia. s/p NGT decompression (-3.1 L feculent output) in ED. Intubated 11/6 for AHRF. EGD 11/8 w/ multiple ulcers, largest 10cm), path +gastritis. Now with new R colon/cecal mass pending colonoscopy when more stable.   Extubated 11/13 to AVAPs, weaned to HFNC however transitioned back to BiPAP after desaturation. Patient was weak and unable to clear secretions despite non-invasive measures. Reintubated 11/18 PM, s/p trach/PEJ 11.19 with thoracic.     #AHRF 2/2 septic shock due to ESBL proteus UTI s/p intubation x2, s/p trach 11/19  #HAGMA 2/2 lactic acidosis and uremia - resolved   #NOLA likely ATN 2/2 septic shock - resolved   #Possible aspiration  #Congenital solitary kidney  - Intubated 11/6, extubated 11/13 to AVAPS, re-intubated 11/18, s/p trach placement by CT surg 11/19  - UCx +ESBL proteus, BCX (-), trach culture with resp rosalva, sputum cx 11/12 resp rosalva  - s/p abx (freddie, diflucan, flagyl, vanc)  - c/w mechanical ventilation  - daily SBT, chest PT  - bronch 11/18 and 11/19 for mucus plugging       - few Gram pos cocci in clusters- commensal rosalva       - Per ID, monitor off abx, if clinically worsens can start linezolid  - Switched ipratropium inhaler to nebs  - CXR 11/22- improved      # trach bleed (on 11/30)  - Pt had an episode of  trach bleeding during the night  - Sx Recalled: Packed trach incision with surgicel and covered with gauze.       # Norovirus Diarrhea  - GI PCR on 11/23 and 1/24- positive x 2   - stool cdiff on 11/23 neg  - supportive treatment       #Dysphagia 2/2 critical illness s/p PEJ  - S&S eval - failed  - NGT placement - failed multiple times by ICU staff and ENT,  - s/p PEJ placement by CT surg 11/19  - C/w feeds       #Abdominal distension w/ pneumatosis   #R colon/cecal mass on CT A/P  #Diarrhea- new on 11/23  - NGT placed for decompression in ED with 3.1L feculent output   - GI recs, surgery recs, vascular recs appreciated   - s/p EGD 11/8 with multiple ulcers, erosive gastritis  - EGD path: chronic gastritis, (-) Hpylori, no malignancy   - CT A/P with PO contrast w/ suspicious hepatic flexure narrowing and lobulated soft tissue density in R colon/cecum.   - TSH wnl  - c/w IV PPI BID   - colonoscopy when more stable as per GI      #Sacral DTI  - care per wound care team    #Afib  - holding xarelto   - c/w therapeutic lovenox  - TTE 11/05- EF 71%, hyperdynamic, LA enlargement, fibrocalcific aortic valve w/ moderate AS, trivial pericardial effusion    #HTN  - C/w amlodipine 10mg qd PO and losartan 50mg qd PO home meds     #MISC  - DVT ppx: lovenox  - Diet: NPO w/ TF   - GI ppx: Protonix BID  - Activity: as tolerated   73-year-old female PMH A-fib on xarelto,, HTN, s/p cholecystectomy, congenital solitary kidney presents from NH to the ED for evaluation of weakness, decreased PO intake and abdominal distension.   Admitted to ICU for septic shock 2/2 ESBL proteus (UTI vs. GI source) complicated by gastric distension w/ pneumatosis, NOLA 2/2 ATN, hypernatremia. s/p NGT decompression (-3.1 L feculent output) in ED. Intubated 11/6 for AHRF. EGD 11/8 w/ multiple ulcers, largest 10cm), path +gastritis. Now with new R colon/cecal mass pending colonoscopy when more stable.   Extubated 11/13 to AVAPs, weaned to HFNC however transitioned back to BiPAP after desaturation. Patient was weak and unable to clear secretions despite non-invasive measures. Reintubated 11/18 PM, s/p trach/PEJ 11.19 with thoracic.     #AHRF 2/2 septic shock due to ESBL proteus UTI s/p intubation x2, s/p trach 11/19  #HAGMA 2/2 lactic acidosis and uremia - resolved   #NOLA likely ATN 2/2 septic shock - resolved   #Possible aspiration  #Congenital solitary kidney  - Intubated 11/6, extubated 11/13 to AVAPS, re-intubated 11/18, s/p trach placement by CT surg 11/19  - UCx +ESBL proteus, BCX (-), trach culture with resp rosalva, sputum cx 11/12 resp rosalva  - s/p abx (freddie, diflucan, flagyl, vanc)  - c/w mechanical ventilation  - daily SBT, chest PT  - bronch 11/18 and 11/19 for mucus plugging       - few Gram pos cocci in clusters- commensal rosalva       - Per ID, monitor off abx, if clinically worsens can start linezolid  - Switched ipratropium inhaler to nebs  - CXR 11/22- improved      # trach bleed (on 11/30)  - Pt had an episode of  trach bleeding during the night  - Sx Recalled: Packed trach incision with surgicel and covered with gauze.   - Per Sx: hold therapeutic lovenox. Can do dvt ppx dose.      # Norovirus Diarrhea  - GI PCR on 11/23 and 1/24- positive x 2   - stool cdiff on 11/23 neg  - supportive treatment       #Dysphagia 2/2 critical illness s/p PEJ  - S&S eval - failed  - NGT placement - failed multiple times by ICU staff and ENT,  - s/p PEJ placement by CT surg 11/19  - C/w feeds       #Abdominal distension w/ pneumatosis   #R colon/cecal mass on CT A/P  #Diarrhea- new on 11/23  - NGT placed for decompression in ED with 3.1L feculent output   - GI recs, surgery recs, vascular recs appreciated   - s/p EGD 11/8 with multiple ulcers, erosive gastritis  - EGD path: chronic gastritis, (-) Hpylori, no malignancy   - CT A/P with PO contrast w/ suspicious hepatic flexure narrowing and lobulated soft tissue density in R colon/cecum.   - TSH wnl  - c/w IV PPI BID   - colonoscopy when more stable as per GI      #Sacral DTI  - care per wound care team    #Afib  - holding xarelto   - c/w therapeutic lovenox  - TTE 11/05- EF 71%, hyperdynamic, LA enlargement, fibrocalcific aortic valve w/ moderate AS, trivial pericardial effusion    #HTN  - C/w amlodipine 10mg qd PO and losartan 50mg qd PO home meds     #MISC  - DVT ppx: lovenox  - Diet: NPO w/ TF   - GI ppx: Protonix BID  - Activity: as tolerated

## 2024-11-30 NOTE — CHART NOTE - NSCHARTNOTEFT_GEN_A_CORE
Recalled for trach bleed.    Packed trach incision with surgicel. and covered with gauze. Will evaluate further with the attending in the morning. Monitoring for continued bleed. Please hold therapeutic lovenox. Can do dvt ppx dose.

## 2024-11-30 NOTE — PROGRESS NOTE ADULT - SUBJECTIVE AND OBJECTIVE BOX
Over Night Events: events noted, vent dependant, bleeding around trach, afebrile, CT sx noted    PHYSICAL EXAM    ICU Vital Signs Last 24 Hrs  T(C): 36.7 (30 Nov 2024 05:00), Max: 36.8 (29 Nov 2024 21:13)  T(F): 98.1 (30 Nov 2024 05:00), Max: 98.2 (29 Nov 2024 21:13)  HR: 72 (30 Nov 2024 05:00) (63 - 77)  BP: 134/74 (30 Nov 2024 05:00) (129/74 - 134/74)  RR: 18 (30 Nov 2024 05:00) (18 - 18)  SpO2: 100% (30 Nov 2024 05:00) (100% - 100%)    O2 Parameters below as of 29 Nov 2024 21:13  Patient On (Oxygen Delivery Method): ventilator            General: ill looking  trac/ packing  Lungs: Bilateral rhonchi  Cardiovascular: Regular   Abdomen: Soft, Positive BS  Follows commands      11-29-24 @ 07:01  -  11-30-24 @ 07:00  --------------------------------------------------------  IN:    Enteral Tube Flush: 300 mL    Enteral Tube Flush: 50 mL    Jevity 1.2: 900 mL  Total IN: 1250 mL    OUT:  Total OUT: 0 mL    Total NET: 1250 mL          LABS:                          8.0    9.47  )-----------( 92       ( 30 Nov 2024 02:27 )             25.1                                               11-29    144  |  114[H]  |  16  ----------------------------<  91  3.5   |  21  |  0.6[L]    Ca    9.7      29 Nov 2024 08:16  Mg     1.5     11-29    TPro  5.1[L]  /  Alb  2.8[L]  /  TBili  0.4  /  DBili  x   /  AST  9   /  ALT  7   /  AlkPhos  79  11-29                                             Urinalysis Basic - ( 29 Nov 2024 08:16 )    Color: x / Appearance: x / SG: x / pH: x  Gluc: 91 mg/dL / Ketone: x  / Bili: x / Urobili: x   Blood: x / Protein: x / Nitrite: x   Leuk Esterase: x / RBC: x / WBC x   Sq Epi: x / Non Sq Epi: x / Bacteria: x                                                  LIVER FUNCTIONS - ( 29 Nov 2024 08:16 )  Alb: 2.8 g/dL / Pro: 5.1 g/dL / ALK PHOS: 79 U/L / ALT: 7 U/L / AST: 9 U/L / GGT: x                                                                                               Mode: AC/ CMV (Assist Control/ Continuous Mandatory Ventilation)  RR (machine): 14  TV (machine): 360  FiO2: 40  PEEP: 8  ITime: 1  MAP: 13  PIP: 16                                          MEDICATIONS  (STANDING):  amLODIPine   Tablet 10 milliGRAM(s) Oral daily  chlorhexidine 0.12% Liquid 15 milliLiter(s) Oral Mucosa every 12 hours  chlorhexidine 2% Cloths 1 Application(s) Topical <User Schedule>  ipratropium 17 MICROgram(s) HFA Inhaler 2 Puff(s) Inhalation every 6 hours  losartan 50 milliGRAM(s) Oral daily  nystatin Cream 1 Application(s) Topical two times a day  pantoprazole   Suspension 40 milliGRAM(s) Oral two times a day  vitamin A & D Ointment 1 Application(s) Topical two times a day    MEDICATIONS  (PRN):  acetaminophen     Tablet .. 650 milliGRAM(s) Oral every 6 hours PRN Temp greater or equal to 38C (100.4F)  albuterol    90 MICROgram(s) HFA Inhaler 2 Puff(s) Inhalation every 6 hours PRN Shortness of Breath and/or Wheezing

## 2024-11-30 NOTE — CHART NOTE - NSCHARTNOTEFT_GEN_A_CORE
Patient was having large volume bleeding from her trach insertion site  Vitals wnl and patient was asymptomatic  CT surgery called and they said they will come see her. Patient was having large volume bleeding from her trach insertion site  Vitals wnl and patient was asymptomatic  CT surgery called and they said they will come see her.--> Packed trach incision with surgicel. and covered with gauze. Will evaluate further with the attending in the morning  Held lovenox per CT surgery

## 2024-12-01 LAB
ALBUMIN SERPL ELPH-MCNC: 2.9 G/DL — LOW (ref 3.5–5.2)
ALP SERPL-CCNC: 86 U/L — SIGNIFICANT CHANGE UP (ref 30–115)
ALT FLD-CCNC: 8 U/L — SIGNIFICANT CHANGE UP (ref 0–41)
ANION GAP SERPL CALC-SCNC: 9 MMOL/L — SIGNIFICANT CHANGE UP (ref 7–14)
AST SERPL-CCNC: 8 U/L — SIGNIFICANT CHANGE UP (ref 0–41)
BASOPHILS # BLD AUTO: 0.09 K/UL — SIGNIFICANT CHANGE UP (ref 0–0.2)
BASOPHILS NFR BLD AUTO: 0.9 % — SIGNIFICANT CHANGE UP (ref 0–1)
BILIRUB SERPL-MCNC: 0.6 MG/DL — SIGNIFICANT CHANGE UP (ref 0.2–1.2)
BUN SERPL-MCNC: 16 MG/DL — SIGNIFICANT CHANGE UP (ref 10–20)
CALCIUM SERPL-MCNC: 10 MG/DL — SIGNIFICANT CHANGE UP (ref 8.4–10.5)
CHLORIDE SERPL-SCNC: 114 MMOL/L — HIGH (ref 98–110)
CO2 SERPL-SCNC: 23 MMOL/L — SIGNIFICANT CHANGE UP (ref 17–32)
CREAT SERPL-MCNC: 0.7 MG/DL — SIGNIFICANT CHANGE UP (ref 0.7–1.5)
EGFR: 92 ML/MIN/1.73M2 — SIGNIFICANT CHANGE UP
EOSINOPHIL # BLD AUTO: 0.31 K/UL — SIGNIFICANT CHANGE UP (ref 0–0.7)
EOSINOPHIL NFR BLD AUTO: 3.2 % — SIGNIFICANT CHANGE UP (ref 0–8)
GLUCOSE BLDC GLUCOMTR-MCNC: 104 MG/DL — HIGH (ref 70–99)
GLUCOSE BLDC GLUCOMTR-MCNC: 91 MG/DL — SIGNIFICANT CHANGE UP (ref 70–99)
GLUCOSE BLDC GLUCOMTR-MCNC: 93 MG/DL — SIGNIFICANT CHANGE UP (ref 70–99)
GLUCOSE BLDC GLUCOMTR-MCNC: 96 MG/DL — SIGNIFICANT CHANGE UP (ref 70–99)
GLUCOSE SERPL-MCNC: 86 MG/DL — SIGNIFICANT CHANGE UP (ref 70–99)
HCT VFR BLD CALC: 26.8 % — LOW (ref 37–47)
HGB BLD-MCNC: 8.3 G/DL — LOW (ref 12–16)
IMM GRANULOCYTES NFR BLD AUTO: 0.8 % — HIGH (ref 0.1–0.3)
LYMPHOCYTES # BLD AUTO: 2.09 K/UL — SIGNIFICANT CHANGE UP (ref 1.2–3.4)
LYMPHOCYTES # BLD AUTO: 21.7 % — SIGNIFICANT CHANGE UP (ref 20.5–51.1)
MAGNESIUM SERPL-MCNC: 1.9 MG/DL — SIGNIFICANT CHANGE UP (ref 1.8–2.4)
MCHC RBC-ENTMCNC: 30 PG — SIGNIFICANT CHANGE UP (ref 27–31)
MCHC RBC-ENTMCNC: 31 G/DL — LOW (ref 32–37)
MCV RBC AUTO: 96.8 FL — SIGNIFICANT CHANGE UP (ref 81–99)
MONOCYTES # BLD AUTO: 0.66 K/UL — HIGH (ref 0.1–0.6)
MONOCYTES NFR BLD AUTO: 6.9 % — SIGNIFICANT CHANGE UP (ref 1.7–9.3)
NEUTROPHILS # BLD AUTO: 6.38 K/UL — SIGNIFICANT CHANGE UP (ref 1.4–6.5)
NEUTROPHILS NFR BLD AUTO: 66.5 % — SIGNIFICANT CHANGE UP (ref 42.2–75.2)
NRBC # BLD: 0 /100 WBCS — SIGNIFICANT CHANGE UP (ref 0–0)
PLATELET # BLD AUTO: 207 K/UL — SIGNIFICANT CHANGE UP (ref 130–400)
PMV BLD: 11.8 FL — HIGH (ref 7.4–10.4)
POTASSIUM SERPL-MCNC: 3.6 MMOL/L — SIGNIFICANT CHANGE UP (ref 3.5–5)
POTASSIUM SERPL-SCNC: 3.6 MMOL/L — SIGNIFICANT CHANGE UP (ref 3.5–5)
PROT SERPL-MCNC: 5.4 G/DL — LOW (ref 6–8)
RBC # BLD: 2.77 M/UL — LOW (ref 4.2–5.4)
RBC # FLD: 14.2 % — SIGNIFICANT CHANGE UP (ref 11.5–14.5)
SODIUM SERPL-SCNC: 146 MMOL/L — SIGNIFICANT CHANGE UP (ref 135–146)
WBC # BLD: 9.61 K/UL — SIGNIFICANT CHANGE UP (ref 4.8–10.8)
WBC # FLD AUTO: 9.61 K/UL — SIGNIFICANT CHANGE UP (ref 4.8–10.8)

## 2024-12-01 PROCEDURE — 99232 SBSQ HOSP IP/OBS MODERATE 35: CPT

## 2024-12-01 PROCEDURE — 99233 SBSQ HOSP IP/OBS HIGH 50: CPT

## 2024-12-01 PROCEDURE — 71045 X-RAY EXAM CHEST 1 VIEW: CPT | Mod: 26

## 2024-12-01 RX ORDER — ENOXAPARIN SODIUM 30 MG/.3ML
90 INJECTION SUBCUTANEOUS EVERY 12 HOURS
Refills: 0 | Status: DISCONTINUED | OUTPATIENT
Start: 2024-12-01 | End: 2024-12-09

## 2024-12-01 RX ADMIN — ENOXAPARIN SODIUM 40 MILLIGRAM(S): 30 INJECTION SUBCUTANEOUS at 05:45

## 2024-12-01 RX ADMIN — AMLODIPINE BESYLATE 10 MILLIGRAM(S): 10 TABLET ORAL at 05:45

## 2024-12-01 RX ADMIN — PANTOPRAZOLE SODIUM 40 MILLIGRAM(S): 40 TABLET, DELAYED RELEASE ORAL at 05:45

## 2024-12-01 RX ADMIN — CHLORHEXIDINE GLUCONATE 15 MILLILITER(S): 1.2 RINSE ORAL at 05:47

## 2024-12-01 RX ADMIN — Medication 1 APPLICATION(S): at 18:44

## 2024-12-01 RX ADMIN — NYSTATIN 1 APPLICATION(S): 100000 POWDER TOPICAL at 05:46

## 2024-12-01 RX ADMIN — CHLORHEXIDINE GLUCONATE 15 MILLILITER(S): 1.2 RINSE ORAL at 18:45

## 2024-12-01 RX ADMIN — Medication 1 APPLICATION(S): at 05:46

## 2024-12-01 RX ADMIN — NYSTATIN 1 APPLICATION(S): 100000 POWDER TOPICAL at 18:45

## 2024-12-01 RX ADMIN — PANTOPRAZOLE SODIUM 40 MILLIGRAM(S): 40 TABLET, DELAYED RELEASE ORAL at 18:44

## 2024-12-01 RX ADMIN — CHLORHEXIDINE GLUCONATE 1 APPLICATION(S): 1.2 RINSE ORAL at 05:46

## 2024-12-01 RX ADMIN — LOSARTAN POTASSIUM 50 MILLIGRAM(S): 100 TABLET, FILM COATED ORAL at 05:46

## 2024-12-01 NOTE — PROGRESS NOTE ADULT - SUBJECTIVE AND OBJECTIVE BOX
GENERAL SURGERY PROGRESS NOTE    Patient: PATRICIA SPRAGUE , 74y ([birth year])Female   MRN: [id number]  Location: [place] [address] [facility]  Visit: 11-03-24 Inpatient  Date: 12-01-24 @ 03:42    Events of past 24 hours: patiet seen and examined at bedside. No more incidents of bleeding from trach sites. Platelets improving    PAST MEDICAL & SURGICAL HISTORY:  HTN (hypertension)      Diabetes mellitus      Obesity      Gastroesophageal reflux disease      Active asthma      Generalized OA      Afib      H/O hernia repair  2011 and revised in 2013      History of cholecystectomy          Vitals:   T(F): 98.6 (11-30-24 @ 19:56), Max: 98.6 (11-30-24 @ 19:56)  HR: 70 (12-01-24 @ 00:59)  BP: 122/60 (11-30-24 @ 19:56)  RR: 19 (11-30-24 @ 19:56)  SpO2: 99% (12-01-24 @ 00:59)  Mode: AC/ CMV (Assist Control/ Continuous Mandatory Ventilation), RR (machine): 14, TV (machine): 360, FiO2: 40, PEEP: 8, MAP: 12, PIP: 29    Diet, NPO with Tube Feed:   Tube Feeding Modality: Gastro-Jejunostomy  Jevity 1.2 Jeff (JEVITY1.2RTH)  Total Volume for 24 Hours (mL): 1350  Continuous  Starting Tube Feed Rate mL per Hour: 25  Until Goal Tube Feed Rate (mL per Hour): 75  Tube Feed Duration (in Hours): 18  Tube Feed Start Time: 12:00  Free Water Flush Instructions:  100 mL q4hrs; Please flush with 30 mL pre/post no carb prosource  No Carb Prosource (1pkg = 15gms Protein)     Qty per Day:  2  Banatrol TF     Qty per Day:  4 per day      Fluids:     I & O's:    11-29-24 @ 07:01  -  11-30-24 @ 07:00  --------------------------------------------------------  IN:    Enteral Tube Flush: 300 mL    Enteral Tube Flush: 50 mL    Jevity 1.2: 900 mL  Total IN: 1250 mL    OUT:  Total OUT: 0 mL    Total NET: 1250 mL        Bowel Movement: : [] YES [] NO  Flatus: : [] YES [] NO    PHYSICAL EXAM:    General: on trach  Lungs: settings 40/8  Cardiovascular: Regular   Abdomen: Soft, Positive BS  Follows commands    MEDICATIONS  (STANDING):  amLODIPine   Tablet 10 milliGRAM(s) Oral daily  chlorhexidine 0.12% Liquid 15 milliLiter(s) Oral Mucosa every 12 hours  chlorhexidine 2% Cloths 1 Application(s) Topical <User Schedule>  enoxaparin Injectable 40 milliGRAM(s) SubCutaneous every 24 hours  ipratropium 17 MICROgram(s) HFA Inhaler 2 Puff(s) Inhalation every 6 hours  losartan 50 milliGRAM(s) Oral daily  nystatin Cream 1 Application(s) Topical two times a day  pantoprazole   Suspension 40 milliGRAM(s) Oral two times a day  vitamin A & D Ointment 1 Application(s) Topical two times a day    MEDICATIONS  (PRN):  acetaminophen     Tablet .. 650 milliGRAM(s) Oral every 6 hours PRN Temp greater or equal to 38C (100.4F)  albuterol    90 MICROgram(s) HFA Inhaler 2 Puff(s) Inhalation every 6 hours PRN Shortness of Breath and/or Wheezing      DVT PROPHYLAXIS: enoxaparin Injectable 40 milliGRAM(s) SubCutaneous every 24 hours    GI PROPHYLAXIS: pantoprazole   Suspension 40 milliGRAM(s) Oral two times a day    ANTICOAGULATION:   ANTIBIOTICS:            LAB/STUDIES:  Labs:  CAPILLARY BLOOD GLUCOSE      POCT Blood Glucose.: 126 mg/dL (30 Nov 2024 23:41)  POCT Blood Glucose.: 87 mg/dL (30 Nov 2024 11:21)  POCT Blood Glucose.: 96 mg/dL (30 Nov 2024 07:49)                          8.1    8.62  )-----------( 219      ( 30 Nov 2024 07:59 )             25.7       Auto Neutrophil %: 67.8 % (11-30-24 @ 07:59)  Auto Immature Granulocyte %: 0.8 % (11-30-24 @ 07:59)    11-30    146  |  114[H]  |  14  ----------------------------<  87  3.6   |  23  |  0.6[L]      Calcium: 9.6 mg/dL (11-30-24 @ 07:59)      LFTs:             5.1  | 0.4  | 8        ------------------[87      ( 30 Nov 2024 07:59 )  2.9  | x    | 8           Lipase:x      Amylase:x             Coags:            Urinalysis Basic - ( 30 Nov 2024 07:59 )    Color: x / Appearance: x / SG: x / pH: x  Gluc: 87 mg/dL / Ketone: x  / Bili: x / Urobili: x   Blood: x / Protein: x / Nitrite: x   Leuk Esterase: x / RBC: x / WBC x   Sq Epi: x / Non Sq Epi: x / Bacteria: x                IMAGING:  < from: Xray Chest 1 View- PORTABLE-Urgent (Xray Chest 1 View- PORTABLE-Urgent .) (11.30.24 @ 07:54) >    IMPRESSION:    Unchanged bibasilar opacity/effusion.    < end of copied text >           GENERAL SURGERY PROGRESS NOTE    Patient: PATRICIA SPRAGUE , 74y ([birth year])Female   MRN: [id number]  Location: [address]  Visit: 11-03-24 Inpatient  Date: 12-01-24 @ 03:42    Events of past 24 hours: patient seen and examined at bedside. No more incidents of bleeding from trach sites. Platelets improving    PAST MEDICAL & SURGICAL HISTORY:  HTN (hypertension)      Diabetes mellitus      Obesity      Gastroesophageal reflux disease      Active asthma      Generalized OA      Afib      H/O hernia repair  2011 and revised in 2013      History of cholecystectomy          Vitals:   T(F): 98.6 (11-30-24 @ 19:56), Max: 98.6 (11-30-24 @ 19:56)  HR: 70 (12-01-24 @ 00:59)  BP: 122/60 (11-30-24 @ 19:56)  RR: 19 (11-30-24 @ 19:56)  SpO2: 99% (12-01-24 @ 00:59)  Mode: AC/ CMV (Assist Control/ Continuous Mandatory Ventilation), RR (machine): 14, TV (machine): 360, FiO2: 40, PEEP: 8, MAP: 12, PIP: 29    Diet, NPO with Tube Feed:   Tube Feeding Modality: Gastro-Jejunostomy  Jevity 1.2 Jeff (JEVITY1.2RTH)  Total Volume for 24 Hours (mL): 1350  Continuous  Starting Tube Feed Rate mL per Hour: 25  Until Goal Tube Feed Rate (mL per Hour): 75  Tube Feed Duration (in Hours): 18  Tube Feed Start Time: 12:00  Free Water Flush Instructions:  100 mL q4hrs; Please flush with 30 mL pre/post no carb prosource  No Carb Prosource (1pkg = 15gms Protein)     Qty per Day:  2  Banatrol TF     Qty per Day:  4 per day      Fluids:     I & O's:    11-29-24 @ 07:01  -  11-30-24 @ 07:00  --------------------------------------------------------  IN:    Enteral Tube Flush: 300 mL    Enteral Tube Flush: 50 mL    Jevity 1.2: 900 mL  Total IN: 1250 mL    OUT:  Total OUT: 0 mL    Total NET: 1250 mL        Bowel Movement: : [] YES [] NO  Flatus: : [] YES [] NO    PHYSICAL EXAM:    General: on trach  Lungs: settings 40/8  Cardiovascular: Regular   Abdomen: Soft, Positive BS  Follows commands    MEDICATIONS  (STANDING):  amLODIPine   Tablet 10 milliGRAM(s) Oral daily  chlorhexidine 0.12% Liquid 15 milliLiter(s) Oral Mucosa every 12 hours  chlorhexidine 2% Cloths 1 Application(s) Topical <User Schedule>  enoxaparin Injectable 40 milliGRAM(s) SubCutaneous every 24 hours  ipratropium 17 MICROgram(s) HFA Inhaler 2 Puff(s) Inhalation every 6 hours  losartan 50 milliGRAM(s) Oral daily  nystatin Cream 1 Application(s) Topical two times a day  pantoprazole   Suspension 40 milliGRAM(s) Oral two times a day  vitamin A & D Ointment 1 Application(s) Topical two times a day    MEDICATIONS  (PRN):  acetaminophen     Tablet .. 650 milliGRAM(s) Oral every 6 hours PRN Temp greater or equal to 38C (100.4F)  albuterol    90 MICROgram(s) HFA Inhaler 2 Puff(s) Inhalation every 6 hours PRN Shortness of Breath and/or Wheezing      DVT PROPHYLAXIS: enoxaparin Injectable 40 milliGRAM(s) SubCutaneous every 24 hours    GI PROPHYLAXIS: pantoprazole   Suspension 40 milliGRAM(s) Oral two times a day    ANTICOAGULATION:   ANTIBIOTICS:            LAB/STUDIES:  Labs:  CAPILLARY BLOOD GLUCOSE      POCT Blood Glucose.: 126 mg/dL (30 Nov 2024 23:41)  POCT Blood Glucose.: 87 mg/dL (30 Nov 2024 11:21)  POCT Blood Glucose.: 96 mg/dL (30 Nov 2024 07:49)                          8.1    8.62  )-----------( 219      ( 30 Nov 2024 07:59 )             25.7       Auto Neutrophil %: 67.8 % (11-30-24 @ 07:59)  Auto Immature Granulocyte %: 0.8 % (11-30-24 @ 07:59)    11-30    146  |  114[H]  |  14  ----------------------------<  87  3.6   |  23  |  0.6[L]      Calcium: 9.6 mg/dL (11-30-24 @ 07:59)      LFTs:             5.1  | 0.4  | 8        ------------------[87      ( 30 Nov 2024 07:59 )  2.9  | x    | 8           Lipase:x      Amylase:x             Coags:            Urinalysis Basic - ( 30 Nov 2024 07:59 )    Color: x / Appearance: x / SG: x / pH: x  Gluc: 87 mg/dL / Ketone: x  / Bili: x / Urobili: x   Blood: x / Protein: x / Nitrite: x   Leuk Esterase: x / RBC: x / WBC x   Sq Epi: x / Non Sq Epi: x / Bacteria: x                IMAGING:  < from: Xray Chest 1 View- PORTABLE-Urgent (Xray Chest 1 View- PORTABLE-Urgent .) (11.30.24 @ 07:54) >    IMPRESSION:    Unchanged bibasilar opacity/effusion.    < end of copied text >

## 2024-12-01 NOTE — PROGRESS NOTE ADULT - ASSESSMENT
73-year-old female PMH A-fib on xarelto, HTN, s/p cholecystectomy, congenital solitary kidney s/p tracheostomy and PEG tube placement    PLAN:  continue to hold therapeutic LVX  trend platelets  monitor trach for bleeding  rest off the care per primary team     spectra [phone] 73-year-old female PMH A-fib on xarelto, HTN, s/p cholecystectomy, congenital solitary kidney s/p tracheostomy and PEG tube placement. Recalled for bleeding around tracheostomy.     PLAN:  decision to resume therapeutic lovenox as per primary team.   platelets increased, no bleeding in trach  rest off the care per primary team   recall surgery as necessary    spectra [phone]

## 2024-12-01 NOTE — PROVIDER CONTACT NOTE (OTHER) - RECOMMENDATIONS
Notified Dr. Colvin
Come and assess the pt.
Come and assess the pt.
Common and assess the pt.
Writing RN suggested another line or central line.
come and assess the pt.

## 2024-12-01 NOTE — PROGRESS NOTE ADULT - ASSESSMENT
73-year-old female PMH A-fib on xarelto, HTN, s/p cholecystectomy, congenital solitary kidney presents from NH to the ED for evaluation of weakness, decreased PO intake and abdominal distension.     Admitted to ICU for septic shock requiring pressors 2/2 ESBL proteus (UTI vs. GI source) complicated by gastric distension w/ pneumatosis, NOLA 2/2 ATN, hypernatremia. s/p NGT decompression (-3.1 L feculent output) in ED. Intubated 11/6 for AHRF. EGD 11/8 w/ multiple ulcers, largest 10cm, path +gastritis. Now with new R colon/cecal mass pending colonoscopy when more stable.   Extubated 11/13 to AVAPs, weaned to HFNC however transitioned back to BiPAP after desaturation. Patient was weak and unable to clear secretions despite non-invasive measures. Reintubated 11/18 PM, s/p trach/PEJ 11/19 with thoracic sx.    # Norovirus Diarrhea- GI PCR on 11/23 and 1/24- positive x 2 , GI PCR ,  on contact isolation , supportive tx.  - stool cdiff on 11/23 neg  -11/29- still with persistent watery diarrhea noted in [facility]   12/1: Diarrhea persists. Can hold Feeds for 24 hours per patient's wishes and see if diarrhea improves with that. c/w Fingersticks Q6, Vitals monitoring. If needed, low dose maintenance fluids until tomorrow.     #AHRF 2/2 septic shock due to ESBL proteus (UTI Vs. GI source) s/p intubation x2, s/p trach 11/19  #HAGMA 2/2 lactic acidosis and uremia - resolved   #NOLA likely ATN 2/2 septic shock - resolved   #Possible aspiration  #Congenital solitary kidney  - Intubated 11/6, extubated 11/13 to AVAPS, re-intubated 11/18, s/p trach 11/19  - UCx +ESBL proteus, BCX (-), trach culture with resp rosalva, sputum cx 11/12 resp rosalva  - off pressors, off sedation  - s/p abx (finished 11/13)  - c/w mechanical ventilation, SBT as tolerated   - pain management,  chest PT/MDI q6h  - post bronch on 11/20- neg , f/u bronch cx/gram stain (11/18-neg , 11/19- neg )      #Dysphagia 2/2 critical illness s/p PEJ  - PEJ w/ thoracic 11/19- tolerating feedings well  J port clogged (11/29). Try Clog Zapper. Or call Thoracic Surgery.    #R colon/cecal mass on CT A/P  #Multiple gastric ulcers iso chronic gastritis   #Abdominal distension w/ pneumatosis - resolved   #Diarrhea - recurrent on 11/23- obtained GI PCR  - CT A/P on admission with severe gastric distension with new pneumatosis along posterior gastric wall suspicious for ischemia, foci of free intraperitoneal air ; suspicious portal venous gas; thick bladder wall (cystitis vs colovesicular fistula)   - s/p EGD 11/8 with multiple ulcers, erosive gastritis:  EGD path: chronic gastritis (-) H pylori, no malignancy   - repeated CT A/P with PO contrast 11.8 w/ suspicious hepatic flexure narrowing and lobulated soft tissue density in R colon/cecum.   -  Cdiff (-)  -  PPI BID   - colonoscopy when more stable as per GI    #Hypernatremia- corrected . free water via peg, daily BMP  # Hypomagnesemia - supplemented        #Sacral DTI - offloading, wound care, pressure injury prevention    #Afib- resumed on therapeutic lovenox tx.    #HTN - amlodipine 10mg qd PO, losartan 50mg qd PO (home meds)    #TRacheostomy bleeding: noted on 11/30, s/p surgicel by surgery. Stopped as of 12/1. Increase lovenox back from prophylactic to therapeutic dose.      DVT ppx:  on lovenox tx.    Diet: NPO w/ PEJ feeding  GI ppx/Bowel regimen: PPI BID    Code status: full code    Family discussion: current patient's condition and medical plan of care d/w family by bedside    #Progress Note Handoff: monitor for resolution of diarrhea,  electrolytes supplement PRN,  vent management as per Pulm. team    Disposition: SNF after diarrhea improves.   J port clogged - f/u surgery .

## 2024-12-01 NOTE — PROGRESS NOTE ADULT - SUBJECTIVE AND OBJECTIVE BOX
S: No new events/complaints  Diarrhea persists  No bloating / abdo pain.      All other pertinent ROS negative.      11-30-24 @ 07:01  -  12-01-24 @ 07:00  --------------------------------------------------------  IN: 1325 mL / OUT: 1125 mL / NET: 200 mL    12-01-24 @ 07:01  -  12-01-24 @ 19:43  --------------------------------------------------------  IN: 0 mL / OUT: 300 mL / NET: -300 mL      Vital Signs Last 24 Hrs  T(C): 36.5 (01 Dec 2024 12:00), Max: 37 (30 Nov 2024 19:56)  T(F): 97.7 (01 Dec 2024 12:00), Max: 98.6 (30 Nov 2024 19:56)  HR: 75 (01 Dec 2024 17:02) (64 - 79)  BP: 115/56 (01 Dec 2024 12:00) (106/59 - 122/60)  BP(mean): --  RR: 18 (01 Dec 2024 05:47) (18 - 19)  SpO2: 100% (01 Dec 2024 17:02) (99% - 100%)    Parameters below as of 01 Dec 2024 05:47  Patient On (Oxygen Delivery Method): ventilator      PHYSICAL EXAM:    Constitutional: trach/peg/vent. Communicates by writing  HEENT: PERR, EOMI, Normal Hearing, MMM  Neck: Soft and supple, No LAD, No JVD  Respiratory: Breath sounds are clear bilaterally, No wheezing, rales or rhonchi  Cardiovascular: S1 and S2, regular rate and rhythm, no Murmurs, gallops or rubs  Gastrointestinal: Bowel Sounds present, soft, nontender, nondistended, no guarding, no rebound  Extremities: No peripheral edema      MEDICATIONS:  MEDICATIONS  (STANDING):  amLODIPine   Tablet 10 milliGRAM(s) Oral daily  chlorhexidine 0.12% Liquid 15 milliLiter(s) Oral Mucosa every 12 hours  chlorhexidine 2% Cloths 1 Application(s) Topical <User Schedule>  enoxaparin Injectable 40 milliGRAM(s) SubCutaneous every 24 hours  ipratropium 17 MICROgram(s) HFA Inhaler 2 Puff(s) Inhalation every 6 hours  losartan 50 milliGRAM(s) Oral daily  nystatin Cream 1 Application(s) Topical two times a day  pantoprazole   Suspension 40 milliGRAM(s) Oral two times a day  vitamin A & D Ointment 1 Application(s) Topical two times a day      LABS: All Labs Reviewed:                        8.3    9.61  )-----------( 207      ( 01 Dec 2024 07:37 )             26.8     12-01    146  |  114[H]  |  16  ----------------------------<  86  3.6   |  23  |  0.7    Ca    10.0      01 Dec 2024 07:37  Mg     1.9     12-01    TPro  5.4[L]  /  Alb  2.9[L]  /  TBili  0.6  /  DBili  x   /  AST  8   /  ALT  8   /  AlkPhos  86  12-01          Blood Culture:     Radiology: reviewed

## 2024-12-01 NOTE — PROGRESS NOTE ADULT - SUBJECTIVE AND OBJECTIVE BOX
Over Night Events: events noted, vent dependant, CT sx noted, 9 h PS    PHYSICAL EXAM    ICU Vital Signs Last 24 Hrs  T(C): 36.4 (01 Dec 2024 05:47), Max: 37 (30 Nov 2024 19:56)  T(F): 97.6 (01 Dec 2024 05:47), Max: 98.6 (30 Nov 2024 19:56)  HR: 66 (01 Dec 2024 05:47) (66 - 80)  BP: 106/59 (01 Dec 2024 05:47) (106/59 - 140/76)  RR: 18 (01 Dec 2024 05:47) (18 - 19)  SpO2: 100% (01 Dec 2024 05:47) (99% - 100%)    O2 Parameters below as of 01 Dec 2024 05:47  Patient On (Oxygen Delivery Method): ventilator             General: Ill looking  Trach  Lungs: Bilateral rhonchi  Cardiovascular: Regular   Abdomen: Soft, Positive BS  follows comamnds      11-30-24 @ 07:01  -  12-01-24 @ 07:00  --------------------------------------------------------  IN:    Enteral Tube Flush: 200 mL    Jevity 1.2: 1125 mL  Total IN: 1325 mL    OUT:    Rectal Tube (mL): 500 mL    Voided (mL): 625 mL  Total OUT: 1125 mL    Total NET: 200 mL          LABS:                          8.3    9.61  )-----------( 207      ( 01 Dec 2024 07:37 )             26.8                                               11-30    146  |  114[H]  |  14  ----------------------------<  87  3.6   |  23  |  0.6[L]    Ca    9.6      30 Nov 2024 07:59  Mg     2.9     11-30    TPro  5.1[L]  /  Alb  2.9[L]  /  TBili  0.4  /  DBili  x   /  AST  8   /  ALT  8   /  AlkPhos  87  11-30                                             Urinalysis Basic - ( 30 Nov 2024 07:59 )    Color: x / Appearance: x / SG: x / pH: x  Gluc: 87 mg/dL / Ketone: x  / Bili: x / Urobili: x   Blood: x / Protein: x / Nitrite: x   Leuk Esterase: x / RBC: x / WBC x   Sq Epi: x / Non Sq Epi: x / Bacteria: x                                                  LIVER FUNCTIONS - ( 30 Nov 2024 07:59 )  Alb: 2.9 g/dL / Pro: 5.1 g/dL / ALK PHOS: 87 U/L / ALT: 8 U/L / AST: 8 U/L / GGT: x                                                                                               Mode: AC/ CMV (Assist Control/ Continuous Mandatory Ventilation)  RR (machine): 14  TV (machine): 360  FiO2: 40  PEEP: 8  MAP: 12  PIP: 29                                          MEDICATIONS  (STANDING):  amLODIPine   Tablet 10 milliGRAM(s) Oral daily  chlorhexidine 0.12% Liquid 15 milliLiter(s) Oral Mucosa every 12 hours  chlorhexidine 2% Cloths 1 Application(s) Topical <User Schedule>  enoxaparin Injectable 40 milliGRAM(s) SubCutaneous every 24 hours  ipratropium 17 MICROgram(s) HFA Inhaler 2 Puff(s) Inhalation every 6 hours  losartan 50 milliGRAM(s) Oral daily  nystatin Cream 1 Application(s) Topical two times a day  pantoprazole   Suspension 40 milliGRAM(s) Oral two times a day  vitamin A & D Ointment 1 Application(s) Topical two times a day    MEDICATIONS  (PRN):  acetaminophen     Tablet .. 650 milliGRAM(s) Oral every 6 hours PRN Temp greater or equal to 38C (100.4F)  albuterol    90 MICROgram(s) HFA Inhaler 2 Puff(s) Inhalation every 6 hours PRN Shortness of Breath and/or Wheezing    cxr noted

## 2024-12-01 NOTE — PROGRESS NOTE ADULT - ASSESSMENT
IMPRESSION:    Acute hypoxemic respiratory failure SP trach and PEG   Left lung atelectasis SP multiple bronchoscopies   Septic shock - resolved  UTI  Suspected colon mass  Severe gastric distension w/ pneumatosis along posterior gastric wall resolved  Possible aspiration / LLL consolidation  Thrombocytopenia   hypernatremia  gastric ulcer   HAGMA  NOLA resolved  Afib on Xarelto  HO HTN  HO ESBL Proteus    PLAN:    CNS:  Pain control as needed .     HEENT: Oral care. Trach care     PULMONARY: Vent changes;  Wean o2 as tolerated.  40%,  PEEP to 8.  Keep O2 sat 92-96%.  Pulmonary toilet.  CXR noted, CT sx noted     CARDIOVASCULAR: Avoid overload.     GI: GI prophylaxis.  Tube feeding today.  Bowel regimen.      RENAL:  Follow up lytes. Correct as needed.    INFECTIOUS DISEASE: SP Meropenem course.  Monitor VS     HEMATOLOGICAL: Therapeutic AC if CBC remains stable    ENDOCRINE:  Follow up FS. Insulin protocol if needed.    MUSCULOSKELETAL: Bed chair position.  Off loading    Full code.   Vent unit  case discussed with team

## 2024-12-02 LAB
ALBUMIN SERPL ELPH-MCNC: 3 G/DL — LOW (ref 3.5–5.2)
ALP SERPL-CCNC: 85 U/L — SIGNIFICANT CHANGE UP (ref 30–115)
ALT FLD-CCNC: 8 U/L — SIGNIFICANT CHANGE UP (ref 0–41)
ANION GAP SERPL CALC-SCNC: 9 MMOL/L — SIGNIFICANT CHANGE UP (ref 7–14)
AST SERPL-CCNC: 9 U/L — SIGNIFICANT CHANGE UP (ref 0–41)
BASOPHILS # BLD AUTO: 0.07 K/UL — SIGNIFICANT CHANGE UP (ref 0–0.2)
BASOPHILS NFR BLD AUTO: 0.8 % — SIGNIFICANT CHANGE UP (ref 0–1)
BILIRUB SERPL-MCNC: 0.9 MG/DL — SIGNIFICANT CHANGE UP (ref 0.2–1.2)
BUN SERPL-MCNC: 13 MG/DL — SIGNIFICANT CHANGE UP (ref 10–20)
CALCIUM SERPL-MCNC: 9.8 MG/DL — SIGNIFICANT CHANGE UP (ref 8.4–10.4)
CHLORIDE SERPL-SCNC: 113 MMOL/L — HIGH (ref 98–110)
CO2 SERPL-SCNC: 23 MMOL/L — SIGNIFICANT CHANGE UP (ref 17–32)
CREAT SERPL-MCNC: 0.7 MG/DL — SIGNIFICANT CHANGE UP (ref 0.7–1.5)
EGFR: 92 ML/MIN/1.73M2 — SIGNIFICANT CHANGE UP
EOSINOPHIL # BLD AUTO: 0.26 K/UL — SIGNIFICANT CHANGE UP (ref 0–0.7)
EOSINOPHIL NFR BLD AUTO: 2.9 % — SIGNIFICANT CHANGE UP (ref 0–8)
GLUCOSE BLDC GLUCOMTR-MCNC: 135 MG/DL — HIGH (ref 70–99)
GLUCOSE BLDC GLUCOMTR-MCNC: 81 MG/DL — SIGNIFICANT CHANGE UP (ref 70–99)
GLUCOSE BLDC GLUCOMTR-MCNC: 85 MG/DL — SIGNIFICANT CHANGE UP (ref 70–99)
GLUCOSE BLDC GLUCOMTR-MCNC: 87 MG/DL — SIGNIFICANT CHANGE UP (ref 70–99)
GLUCOSE BLDC GLUCOMTR-MCNC: 87 MG/DL — SIGNIFICANT CHANGE UP (ref 70–99)
GLUCOSE SERPL-MCNC: 81 MG/DL — SIGNIFICANT CHANGE UP (ref 70–99)
HCT VFR BLD CALC: 26.3 % — LOW (ref 37–47)
HGB BLD-MCNC: 8.2 G/DL — LOW (ref 12–16)
IMM GRANULOCYTES NFR BLD AUTO: 0.8 % — HIGH (ref 0.1–0.3)
LYMPHOCYTES # BLD AUTO: 1.9 K/UL — SIGNIFICANT CHANGE UP (ref 1.2–3.4)
LYMPHOCYTES # BLD AUTO: 21.3 % — SIGNIFICANT CHANGE UP (ref 20.5–51.1)
MAGNESIUM SERPL-MCNC: 1.8 MG/DL — SIGNIFICANT CHANGE UP (ref 1.8–2.4)
MCHC RBC-ENTMCNC: 30 PG — SIGNIFICANT CHANGE UP (ref 27–31)
MCHC RBC-ENTMCNC: 31.2 G/DL — LOW (ref 32–37)
MCV RBC AUTO: 96.3 FL — SIGNIFICANT CHANGE UP (ref 81–99)
MONOCYTES # BLD AUTO: 0.6 K/UL — SIGNIFICANT CHANGE UP (ref 0.1–0.6)
MONOCYTES NFR BLD AUTO: 6.7 % — SIGNIFICANT CHANGE UP (ref 1.7–9.3)
NEUTROPHILS # BLD AUTO: 6 K/UL — SIGNIFICANT CHANGE UP (ref 1.4–6.5)
NEUTROPHILS NFR BLD AUTO: 67.5 % — SIGNIFICANT CHANGE UP (ref 42.2–75.2)
NRBC # BLD: 0 /100 WBCS — SIGNIFICANT CHANGE UP (ref 0–0)
PLATELET # BLD AUTO: 197 K/UL — SIGNIFICANT CHANGE UP (ref 130–400)
PMV BLD: 11.6 FL — HIGH (ref 7.4–10.4)
POTASSIUM SERPL-MCNC: 3.4 MMOL/L — LOW (ref 3.5–5)
POTASSIUM SERPL-SCNC: 3.4 MMOL/L — LOW (ref 3.5–5)
PROT SERPL-MCNC: 5.2 G/DL — LOW (ref 6–8)
RBC # BLD: 2.73 M/UL — LOW (ref 4.2–5.4)
RBC # FLD: 14.2 % — SIGNIFICANT CHANGE UP (ref 11.5–14.5)
SODIUM SERPL-SCNC: 145 MMOL/L — SIGNIFICANT CHANGE UP (ref 135–146)
WBC # BLD: 8.9 K/UL — SIGNIFICANT CHANGE UP (ref 4.8–10.8)
WBC # FLD AUTO: 8.9 K/UL — SIGNIFICANT CHANGE UP (ref 4.8–10.8)

## 2024-12-02 PROCEDURE — 99232 SBSQ HOSP IP/OBS MODERATE 35: CPT

## 2024-12-02 PROCEDURE — 99233 SBSQ HOSP IP/OBS HIGH 50: CPT

## 2024-12-02 RX ORDER — POTASSIUM CHLORIDE 600 MG/1
40 TABLET, EXTENDED RELEASE ORAL ONCE
Refills: 0 | Status: COMPLETED | OUTPATIENT
Start: 2024-12-02 | End: 2024-12-02

## 2024-12-02 RX ORDER — 0.9 % SODIUM CHLORIDE 0.9 %
1000 INTRAVENOUS SOLUTION INTRAVENOUS
Refills: 0 | Status: DISCONTINUED | OUTPATIENT
Start: 2024-12-02 | End: 2024-12-07

## 2024-12-02 RX ADMIN — Medication 1 APPLICATION(S): at 05:05

## 2024-12-02 RX ADMIN — Medication 1 APPLICATION(S): at 17:06

## 2024-12-02 RX ADMIN — CHLORHEXIDINE GLUCONATE 15 MILLILITER(S): 1.2 RINSE ORAL at 05:05

## 2024-12-02 RX ADMIN — POTASSIUM CHLORIDE 40 MILLIEQUIVALENT(S): 600 TABLET, EXTENDED RELEASE ORAL at 10:21

## 2024-12-02 RX ADMIN — Medication 100 MILLILITER(S): at 10:21

## 2024-12-02 RX ADMIN — NYSTATIN 1 APPLICATION(S): 100000 POWDER TOPICAL at 05:05

## 2024-12-02 RX ADMIN — CHLORHEXIDINE GLUCONATE 1 APPLICATION(S): 1.2 RINSE ORAL at 05:05

## 2024-12-02 RX ADMIN — ENOXAPARIN SODIUM 90 MILLIGRAM(S): 30 INJECTION SUBCUTANEOUS at 05:04

## 2024-12-02 RX ADMIN — Medication 2 PUFF(S): at 13:35

## 2024-12-02 RX ADMIN — Medication 2 PUFF(S): at 21:10

## 2024-12-02 RX ADMIN — AMLODIPINE BESYLATE 10 MILLIGRAM(S): 10 TABLET ORAL at 05:04

## 2024-12-02 RX ADMIN — PANTOPRAZOLE SODIUM 40 MILLIGRAM(S): 40 TABLET, DELAYED RELEASE ORAL at 05:04

## 2024-12-02 RX ADMIN — NYSTATIN 1 APPLICATION(S): 100000 POWDER TOPICAL at 17:52

## 2024-12-02 RX ADMIN — CHLORHEXIDINE GLUCONATE 15 MILLILITER(S): 1.2 RINSE ORAL at 17:08

## 2024-12-02 RX ADMIN — Medication 2 PUFF(S): at 08:12

## 2024-12-02 RX ADMIN — PANTOPRAZOLE SODIUM 40 MILLIGRAM(S): 40 TABLET, DELAYED RELEASE ORAL at 17:10

## 2024-12-02 RX ADMIN — LOSARTAN POTASSIUM 50 MILLIGRAM(S): 100 TABLET, FILM COATED ORAL at 05:04

## 2024-12-02 RX ADMIN — ENOXAPARIN SODIUM 90 MILLIGRAM(S): 30 INJECTION SUBCUTANEOUS at 17:06

## 2024-12-02 NOTE — CONSULT NOTE ADULT - ASSESSMENT
# Norovirus Diarrhea- GI PCR on 11/23 and 1/24- positive x 2 , stool cdiff on 11/23 neg  #AHRF 2/2 septic shock due to ESBL proteus (UTI Vs. GI source) s/p intubation x2, s/p trach 11/19  #HAGMA 2/2 lactic acidosis and uremia - resolved   #NOLA likely ATN 2/2 septic shock - resolved   #Possible aspiration  #Congenital solitary kidney  #Dysphagia 2/2 critical illness s/p G-J placement  #R colon/cecal mass on CT A/P  #Multiple gastric ulcers and chronic gastritis   #Abdominal distension w/ pneumatosis - resolved   #Sacral DTI  #Afib  #HTN     suggest:  - change feeds temporarily to Peptamen 1.5 at 70 ml/h x 16 h (2pm to 6 am) via jejunal port of GJ    - suspect loose stool is colonic in origin and not r/t food, but giving a hydrolyzed formula removes the issue from      consideration.   - replete Mg and K via IV fluid  - was A1c ever done? If fingerstick glc repeatedly wnl, stop checking fingersticks  - unclear if Banatrol ever given. Would ask ID when it is "safe" to give stool thickening agents     - then start half-dose cholestyramine via Gastric port twice a day at 2 pm and 10 pm    - can give Banatrol TF via either port up to 3 times a day prn loose stool, but need to document when it's given

## 2024-12-02 NOTE — PROGRESS NOTE ADULT - SUBJECTIVE AND OBJECTIVE BOX
Patient is a 74y old  Female who presents with a chief complaint of abd pain (01 Dec 2024 08:52)        Over Night Events:  on MV          ROS:     All ROS are negative except HPI         PHYSICAL EXAM    ICU Vital Signs Last 24 Hrs  T(C): 36.6 (02 Dec 2024 04:46), Max: 36.9 (01 Dec 2024 20:27)  T(F): 97.8 (02 Dec 2024 04:46), Max: 98.5 (01 Dec 2024 20:27)  HR: 70 (02 Dec 2024 04:46) (61 - 79)  BP: 124/73 (02 Dec 2024 04:46) (112/60 - 124/73)  BP(mean): 90 (02 Dec 2024 04:46) (90 - 90)  ABP: --  ABP(mean): --  RR: 20 (02 Dec 2024 04:46) (20 - 20)  SpO2: 100% (02 Dec 2024 04:46) (99% - 100%)    O2 Parameters below as of 02 Dec 2024 04:46  Patient On (Oxygen Delivery Method): ventilator            CONSTITUTIONAL:   NAD    ENT:   Airway patent,   Mouth with normal mucosa.   Trach     EYES:   Pupils equal,   Round and reactive to light.    CARDIAC:   Normal rate,   Regular rhythm.    No edema    RESPIRATORY:   No wheezing  Bilateral BS  Normal chest expansion  Not tachypneic,  No use of accessory muscles    GASTROINTESTINAL:  Abdomen soft,   Non-tender,   No guarding,   + BS    MUSCULOSKELETAL:   Range of motion is not limited,  No clubbing, cyanosis      SKIN:   Skin normal color for race,   Warm and dry    No evidence of rash.        11-30-24 @ 07:01  -  12-01-24 @ 07:00  --------------------------------------------------------  IN:    Enteral Tube Flush: 200 mL    Jevity 1.2: 1125 mL  Total IN: 1325 mL    OUT:    Rectal Tube (mL): 500 mL    Voided (mL): 625 mL  Total OUT: 1125 mL    Total NET: 200 mL      12-01-24 @ 07:01  -  12-02-24 @ 06:55  --------------------------------------------------------  IN:  Total IN: 0 mL    OUT:    Rectal Tube (mL): 500 mL    Voided (mL): 1000 mL  Total OUT: 1500 mL    Total NET: -1500 mL          LABS:                            8.3    9.61  )-----------( 207      ( 01 Dec 2024 07:37 )             26.8                                               12-01    146  |  114[H]  |  16  ----------------------------<  86  3.6   |  23  |  0.7    Ca    10.0      01 Dec 2024 07:37  Mg     1.9     12-01    TPro  5.4[L]  /  Alb  2.9[L]  /  TBili  0.6  /  DBili  x   /  AST  8   /  ALT  8   /  AlkPhos  86  12-01                                             Urinalysis Basic - ( 01 Dec 2024 07:37 )    Color: x / Appearance: x / SG: x / pH: x  Gluc: 86 mg/dL / Ketone: x  / Bili: x / Urobili: x   Blood: x / Protein: x / Nitrite: x   Leuk Esterase: x / RBC: x / WBC x   Sq Epi: x / Non Sq Epi: x / Bacteria: x                                                  LIVER FUNCTIONS - ( 01 Dec 2024 07:37 )  Alb: 2.9 g/dL / Pro: 5.4 g/dL / ALK PHOS: 86 U/L / ALT: 8 U/L / AST: 8 U/L / GGT: x                                                                                               Mode: AC/ CMV (Assist Control/ Continuous Mandatory Ventilation)  RR (machine): 14  TV (machine): 380  FiO2: 40  PEEP: 8  ITime: 0.8  MAP: 9  PIP: 21                                          MEDICATIONS  (STANDING):  amLODIPine   Tablet 10 milliGRAM(s) Oral daily  chlorhexidine 0.12% Liquid 15 milliLiter(s) Oral Mucosa every 12 hours  chlorhexidine 2% Cloths 1 Application(s) Topical <User Schedule>  enoxaparin Injectable 90 milliGRAM(s) SubCutaneous every 12 hours  ipratropium 17 MICROgram(s) HFA Inhaler 2 Puff(s) Inhalation every 6 hours  losartan 50 milliGRAM(s) Oral daily  nystatin Cream 1 Application(s) Topical two times a day  pantoprazole   Suspension 40 milliGRAM(s) Oral two times a day  vitamin A & D Ointment 1 Application(s) Topical two times a day    MEDICATIONS  (PRN):  acetaminophen     Tablet .. 650 milliGRAM(s) Oral every 6 hours PRN Temp greater or equal to 38C (100.4F)  albuterol    90 MICROgram(s) HFA Inhaler 2 Puff(s) Inhalation every 6 hours PRN Shortness of Breath and/or Wheezing      New X-rays reviewed:                                                                                  ECHO

## 2024-12-02 NOTE — ADVANCED PRACTICE NURSE CONSULT - ASSESSMENT
History of Present Illness:   73-year-old female PMH A-fib on xarelto,, HTN, s/p cholecystectomy, congenital solitary kidney presents from NH to the ED for evaluation of weakness, decreased PO intake and abdominal distension. Pt's son Everton says pt has not been feeling well the past few days, and has had poor appetite which is unlike her. She's been more tired and weak, unable to get out of bed. Has not had a bowel movement in 3 days, pt unsure if she's been passing gas otherwise. She was noted to be hypotensive and have an elevated wbc count at the NH as well. No dysuria, chest pain, SOB, cough or fever per pt otherwise.          Patient received lying in bed. Alert and oriented. Limited mobility. Incontinent of stool. Burger in place. High BMI. On pressors. High risk for pressure injury development and progression.    HEALED  Type of Wound: Friction Injuries  Location: Bilateral buttocks  Patients skin to bilateral buttock and sacrococcygeal area are fully intact at this time.  
History of Present Illness:   73-year-old female PMH A-fib on xarelto,, HTN, s/p cholecystectomy, congenital solitary kidney presents from NH to the ED for evaluation of weakness, decreased PO intake and abdominal distension. Pt's son Everton says pt has not been feeling well the past few days, and has had poor appetite which is unlike her. She's been more tired and weak, unable to get out of bed. Has not had a bowel movement in 3 days, pt unsure if she's been passing gas otherwise. She was noted to be hypotensive and have an elevated wbc count at the NH as well. No dysuria, chest pain, SOB, cough or fever per pt otherwise.          Patient received lying in bed. Alert and oriented. Limited mobility. Incontinent of stool. Burger in place. High BMI. On pressors. High risk for pressure injury development and progression.    Type of Wound: Friction Injuries  Location: Bilateral buttocks  Wound bed: Multiple areas of linear dry hyperpigmentation  Periwound: Intact  Wound exudate: None  Wound odor: No  Induration, erythema, warmth: No  Wound pain: No    Skin to bilateral heels intact at time of assessment.

## 2024-12-02 NOTE — PROGRESS NOTE ADULT - NS ATTEST RISK PROBLEM GEN_ALL_CORE FT
--------------------------------Complexity of data reviewed ----------------------------------------------  The Patients complexity of data reviewed  is Low [ ] Moderate [ ] High [ x] due to the following:   CATEGORY 1: Reviewed prior external records [ ]  Considering/ Ordered a unique test:  Labs [x ] Imaging [x ] Stress Test  [ ] Other: Specify [ ]  Reviewed each unique test result [x ]   Assessment requiring an independent historian [ ]  CATEGORY 2: Independent interpretation of:   X-Ray [x ] EKG [ ]  Other: Specify  [ ]  CATEGORY 3: Discussion of management of tests with clinician outside of my group [x].

## 2024-12-02 NOTE — PROGRESS NOTE ADULT - ATTENDING COMMENTS
73-year-old female PMH A-fib on xarelto, HTN, s/p cholecystectomy, congenital solitary kidney presents from NH to the ED for evaluation of weakness, decreased PO intake and abdominal distension.     Admitted to ICU for septic shock requiring pressors 2/2 ESBL proteus (UTI vs. GI source) complicated by gastric distension w/ pneumatosis, NOLA 2/2 ATN, hypernatremia. s/p NGT decompression (-3.1 L feculent output) in ED. Intubated 11/6 for AHRF. EGD 11/8 w/ multiple ulcers, largest 10cm, path +gastritis. Now with new R colon/cecal mass pending colonoscopy when more stable.   Extubated 11/13 to AVAPs, weaned to HFNC however transitioned back to BiPAP after desaturation. Patient was weak and unable to clear secretions despite non-invasive measures. Reintubated 11/18 PM, s/p trach/PEJ 11/19 with thoracic sx.    # Norovirus Diarrhea- GI PCR on 11/23 and 1/24- positive x 2 , GI PCR ,  on contact isolation , supportive tx.  - stool cdiff on 11/23 neg  -11/29- still with persistent watery diarrhea noted in dignishield   12/1: Diarrhea persists. Can hold Feeds for 24 hours per patient's wishes and see if diarrhea improves with that. c/w Fingersticks Q6, Vitals monitoring. If needed, low dose maintenance fluids until tomorrow.   12/2: Diarrhea partially improved but still persists. Per Nutrition Support can change J feeds to Peptamen 1.5 @ 70cc/hr from 2pm to 6am every day.   This formula is completely absorbed in small intestine. So whatever diarrhea persists now can be attributed to colonic output from Norovirus.   Watch the diarrhea in Dignishield bag.     #AHRF 2/2 septic shock due to ESBL proteus (UTI Vs. GI source) s/p intubation x2, s/p trach 11/19  #HAGMA 2/2 lactic acidosis and uremia - resolved   #NOLA likely ATN 2/2 septic shock - resolved   #Possible aspiration  #Congenital solitary kidney  - Intubated 11/6, extubated 11/13 to AVAPS, re-intubated 11/18, s/p trach 11/19  - UCx +ESBL proteus, BCX (-), trach culture with resp rosalva, sputum cx 11/12 resp rosalva  - off pressors, off sedation  - s/p abx (finished 11/13)  - c/w mechanical ventilation, SBT as tolerated   - pain management,  chest PT/MDI q6h  - post bronch on 11/20- neg , f/u bronch cx/gram stain (11/18-neg , 11/19- neg )      #Dysphagia 2/2 critical illness s/p PEJ  - PEJ w/ thoracic 11/19- tolerating feedings well  J port clogged (11/29). Try Clog Zapper. Or call Thoracic Surgery.    #R colon/cecal mass on CT A/P  #Multiple gastric ulcers iso chronic gastritis   #Abdominal distension w/ pneumatosis - resolved   #Diarrhea - recurrent on 11/23- obtained GI PCR  - CT A/P on admission with severe gastric distension with new pneumatosis along posterior gastric wall suspicious for ischemia, foci of free intraperitoneal air ; suspicious portal venous gas; thick bladder wall (cystitis vs colovesicular fistula)   - s/p EGD 11/8 with multiple ulcers, erosive gastritis:  EGD path: chronic gastritis (-) H pylori, no malignancy   - repeated CT A/P with PO contrast 11.8 w/ suspicious hepatic flexure narrowing and lobulated soft tissue density in R colon/cecum.   -  Cdiff (-)  -  PPI BID   - colonoscopy when more stable as per GI    #Hypernatremia- corrected . free water via peg, daily BMP  # Hypomagnesemia - supplemented     #Sacral DTI - offloading, wound care, pressure injury prevention    #Afib- resumed on therapeutic lovenox tx.    #HTN - amlodipine 10mg qd PO, losartan 50mg qd PO (home meds)    #TRacheostomy bleeding: noted on 11/30, s/p surgicel by surgery. Stopped as of 12/1. Increased lovenox back from prophylactic to therapeutic dose.      DVT ppx:  on lovenox tx.    Diet: NPO w/ PEJ feeding  GI ppx/Bowel regimen: PPI BID    Code status: full code    Family discussion: current patient's condition and medical plan of care d/w family by bedside    #Progress Note Handoff: monitor for resolution of diarrhea,  electrolytes supplement PRN,  vent management as per Pulm. team    Disposition: SNF after diarrhea improves.

## 2024-12-02 NOTE — CONSULT NOTE ADULT - CONSULT REASON
abdominal pain and sepsis
diarrhea
NOLA
tracheostomy  and PEG tube
Shock
r/o mesenteric ischemia
severe gastric distention
GOC

## 2024-12-02 NOTE — CONSULT NOTE ADULT - SUBJECTIVE AND OBJECTIVE BOX
NUTRITION SUPPORT TEAM  -  CONSULT NOTE   pt admitted Nov 3, 2024  73-year-old female PMH A-fib on xarelto, HTN, s/p cholecystectomy, congenital solitary kidney presents from NH to the ED for evaluation of weakness, decreased PO intake and abdominal distension. Prior to ED presentation, pt had not had bowel movement in 3 days, pt unsure if she's been passing gas otherwise. She was noted to be hypotensive and have an elevated wbc count at the NH as well. No dysuria, chest pain, SOB, cough or fever per pt otherwise.   In the ED, pt was hypotensive BP 66/45 S/P LR 2800cc bolus with no improvement in BP, started on peripheral levo.       CTAP with severe gastric distension, multiple foci of intraperitoneal free air and urinary bladder thickening with free air.  Admitted to ICU for septic shock requiring pressors 2/2 ESBL proteus (UTI vs. GI source).  + NOLA 2/2 ATN, hypernatremia. Intubated 11/6 for AHRF.   Surgery consulted, no intervention at that time, NGT placed for gastric decompression with 3.1L feculent output.  GI was consulted for gastric distention and concern of ischemia on imaging. + pneumatosis & concern for gastric wall ischemia,  intraperitoneal air on initial but not subsequent CT. + h/o GT in 2/2023 subsequently removed at NH.  11/7: s/p EGD: Multiple clean based small ulcers were seen in the esophagus likely from NGT trauma. 10 cm Ulcer in the stomach body (Greater curvature).  Ulcers in the antrum and stomach body. Erosions in the stomach compatible with erosive gastritis. Normal mucosa in the whole examined duodenum. biopsies are negative for H. pylori and dysplasia. Question of neoplastic process in R colon; plan colonoscopy when more stable.  Pt was initially on BIPAP switched to HFNC, due to increase respiratory distress patient was intubated on 11/6, pt was later extubated. Extubated 11/13 to AVAPs, weaned to HFNC however transitioned back to BiPAP after desaturation. Patient was weak and unable to clear secretions despite non-invasive measures. Reintubated 11/18 PM, s/p trach/PEJ 11/19 with thoracic sx. Of note patient failed multiple speech and swallow exams.  pt later developed diarrhea, found + norovirus,  PCR on 11/23 and 1/24- positive x 2 , GI PCR ,  on contact isolation , supportive tx. stool cdiff on 11/23 neg    NUTRITION SUPPORT NOTE:  received call from Dr Colvin today - history briefly presented, and issues discussed  spoke with nurse at bedside when pt seen  feeds held 12/1, with subsequent decrease in volume of diarrhea, but stool still loose and brown - not malabsorptive by description.     REVIEW OF SYSTEMS:  Negative except as noted above.     PAST MEDICAL/SURGICAL HISTORY:   HTN (hypertension)  Diabetes mellitus  Obesity  Gastroesophageal reflux disease  Active asthma  Generalized OA  Afib  H/O hernia repair 2011 and revised in 2013  History of cholecystectomy    ALLERGIES:  No Known Allergies      VITALS:  T(F): 97.5 (12-02 @ 11:47), Max: 97.8 (12-02 @ 04:46)  HR: 68 (12-02 @ 11:47) (61 - 70)  BP: 119/80 (12-02 @ 11:47) (119/80 - 124/73)  RR: 19 (12-02 @ 11:47) (19 - 20)  SpO2: 100% (12-02 @ 11:47) (100% - 100%)    HEIGHT/WEIGHT/BMI:   Height (cm): 167.6 (11-20)  Weight (kg): 90.2 (11-20)  BMI (kg/m2): 32.1 (11-20)    I/Os:   12-01-24 @ 07:01  -  12-02-24 @ 07:00  --------------------------------------------------------  IN:  Total IN: 0 mL -- ? - pt receiving IV fluid  OUT:    Rectal Tube (mL): 500 mL    Voided (mL): 1000 mL  Total OUT: 1500 mL  Total NET: -1500 mL    Physical Exam  pt alert, responsive  + trach/ vent  abd soft, obese, + L sided tenderness on palpation  G-J in place, no leak or surrounding erythema at site  min sacral edema, no LE edema  no CVC  + dignishield    STANDING MEDICATIONS:   amLODIPine   Tablet 10 milliGRAM(s) Oral daily  chlorhexidine 0.12% Liquid 15 milliLiter(s) Oral Mucosa every 12 hours  chlorhexidine 2% Cloths 1 Application(s) Topical <User Schedule>  dextrose 5%. 1000 milliLiter(s) IV Continuous <Continuous>  enoxaparin Injectable 90 milliGRAM(s) SubCutaneous every 12 hours  ipratropium 17 MICROgram(s) HFA Inhaler 2 Puff(s) Inhalation every 6 hours  losartan 50 milliGRAM(s) Oral daily  nystatin Cream 1 Application(s) Topical two times a day  pantoprazole   Suspension 40 milliGRAM(s) Oral two times a day  vitamin A & D Ointment 1 Application(s) Topical two times a day    LABS:                         8.2    8.90  )-----------( 197      ( 02 Dec 2024 07:02 )             26.3     145  |  113[H]  |  13  ----------------------------<  81          (12-02-24 @ 07:02)  3.4[L]   |  23  |  0.7    Ca    9.8          (12-02-24 @ 07:02)  Mg     1.8         (12-02-24 @ 07:02)    TPro  5.2[L]  /  Alb  3.0[L]  /  TBili  0.9  /  DBili  x   /  AST  9   /  ALT  8   /  AlkPhos  85       12-02-24 @ 07:02    Phosphorus: 2.9 mg/dL (11.25.24 @ 04:30)   Triglycerides, Serum: 178 mg/dL (11-20 @ 04:32)  Zinc Level, Plasma:   A1C:    Blood Glucose (Past 24 hours):  87 mg/dL (12-02 @ 11:33)  85 mg/dL (12-02 @ 08:15)  81 mg/dL (12-02 @ 06:50)  87 mg/dL (12-02 @ 00:29)  96 mg/dL (12-01 @ 18:29)      DIET:   Diet, NPO with Tube Feed:   Tube Feeding Modality: Gastro-Jejunostomy  Jevity 1.2 Jeff (JEVITY1.2RTH)  Total Volume for 24 Hours (mL): 1350  Continuous  Starting Tube Feed Rate mL per Hour: 25  Until Goal Tube Feed Rate (mL per Hour): 75  Tube Feed Duration (in Hours): 18  Tube Feed Start Time: 12:00  Free Water Flush Instructions:  100 mL q4hrs; Please flush with 30 mL pre/post no carb prosource  No Carb Prosource (1pkg = 15gms Protein)     Qty per Day:  2  Banatrol TF     Qty per Day:  4 per day (11-24-24 @ 08:15) [Active]

## 2024-12-02 NOTE — CONSULT NOTE ADULT - CONSULT REQUESTED DATE/TIME
04-Nov-2024 14:33
15-Nov-2024 15:19
04-Nov-2024 18:16
03-Nov-2024 15:25
06-Nov-2024 14:38
18-Nov-2024 17:07
02-Dec-2024 13:17
04-Nov-2024 10:50

## 2024-12-02 NOTE — CONSULT NOTE ADULT - PROVIDER SPECIALTY LIST ADULT
Nutrition Support
Nephrology
Critical Care
Gastroenterology
Surgery
Thoracic Surgery
Vascular Surgery
Palliative Care

## 2024-12-02 NOTE — PROGRESS NOTE ADULT - ASSESSMENT
IMPRESSION:    Acute hypoxemic respiratory failure SP trach and PEG   Left lung atelectasis SP multiple bronchoscopies   Septic shock - resolved  UTI  Suspected colon mass  Severe gastric distension w/ pneumatosis along posterior gastric wall resolved  Possible aspiration / LLL consolidation  Thrombocytopenia improved   hypernatremia  Gastric ulcer   NOLA resolved  Afib on Xarelto  HO HTN  HO ESBL Proteus    PLAN:    CNS:  Pain control as needed .     HEENT: Oral care. Trach care.  CTS follow up noted     PULMONARY: Vent changes;  Wean o2 as tolerated.  40%,  PEEP to 8.  Keep O2 sat 92-96%.  Pulmonary toilet.  CXR noted.       CARDIOVASCULAR: Avoid overload.     GI: GI prophylaxis.  Tube feeding.  Bowel regimen.      RENAL:  Follow up lytes. Correct as needed.  Free H2O to correct free H2O deficit     INFECTIOUS DISEASE: SP Meropenem course.  Monitor VS     HEMATOLOGICAL: Therapeutic AC.  Monitor CBC.      ENDOCRINE:  Follow up FS. Insulin protocol if needed.    MUSCULOSKELETAL: Bed chair position.  Off loading    Full code.   Vent unit  DC planing

## 2024-12-02 NOTE — PROGRESS NOTE ADULT - ASSESSMENT
73-year-old female PMH A-fib on xarelto,, HTN, s/p cholecystectomy, congenital solitary kidney presents from NH to the ED for evaluation of weakness, decreased PO intake and abdominal distension.   Admitted to ICU for septic shock 2/2 ESBL proteus (UTI vs. GI source) complicated by gastric distension w/ pneumatosis, NOLA 2/2 ATN, hypernatremia. s/p NGT decompression (-3.1 L feculent output) in ED. Intubated 11/6 for AHRF. EGD 11/8 w/ multiple ulcers, largest 10cm), path +gastritis. Now with new R colon/cecal mass pending colonoscopy when more stable.   Extubated 11/13 to AVAPs, weaned to HFNC however transitioned back to BiPAP after desaturation. Patient was weak and unable to clear secretions despite non-invasive measures. Reintubated 11/18 PM, s/p trach/PEJ 11.19 with thoracic.     #AHRF 2/2 septic shock due to ESBL proteus UTI s/p intubation x2, s/p trach 11/19  #HAGMA 2/2 lactic acidosis and uremia - resolved   #NOLA likely ATN 2/2 septic shock - resolved   #Possible aspiration  #Congenital solitary kidney  - Intubated 11/6, extubated 11/13 to AVAPS, re-intubated 11/18, s/p trach placement by CT surg 11/19  - UCx +ESBL proteus, BCX (-), trach culture with resp rosalva, sputum cx 11/12 resp rosalva  - s/p abx (freddie, diflucan, flagyl, vanc)  - c/w mechanical ventilation  - daily SBT, chest PT  - bronch 11/18 and 11/19 for mucus plugging       - few Gram pos cocci in clusters- commensal rosalva       - Per ID, monitor off abx, if clinically worsens can start linezolid  - Switched ipratropium inhaler to nebs  - CXR 11/22- improved      # trach bleed (on 11/30)  - Pt had an episode of  trach bleeding during the night  - Sx Recalled: Packed trach incision with surgicel and covered with gauze.   - Per Sx: lovenox held and now back to therapeutic dose.      # Norovirus Diarrhea  - GI PCR on 11/23 and 1/24- positive x 2   - stool cdiff on 11/23 neg  - supportive treatment       #Dysphagia 2/2 critical illness s/p PEJ  - S&S eval - failed  - NGT placement - failed multiple times by ICU staff and ENT,  - s/p PEJ placement by CT surg 11/19  - C/w feeds       #Abdominal distension w/ pneumatosis   #R colon/cecal mass on CT A/P  #Diarrhea- new on 11/23  - NGT placed for decompression in ED with 3.1L feculent output   - GI recs, surgery recs, vascular recs appreciated   - s/p EGD 11/8 with multiple ulcers, erosive gastritis  - EGD path: chronic gastritis, (-) Hpylori, no malignancy   - CT A/P with PO contrast w/ suspicious hepatic flexure narrowing and lobulated soft tissue density in R colon/cecum.   - TSH wnl  - c/w IV PPI BID   - colonoscopy when more stable as per GI      #Sacral DTI  - care per wound care team    #Afib  - holding xarelto   - c/w therapeutic lovenox  - TTE 11/05- EF 71%, hyperdynamic, LA enlargement, fibrocalcific aortic valve w/ moderate AS, trivial pericardial effusion    #HTN  - C/w amlodipine 10mg qd PO and losartan 50mg qd PO home meds     #MISC  - DVT ppx: lovenox  - Diet: NPO w/ TF   - GI ppx: Protonix BID  - Activity: as tolerated

## 2024-12-02 NOTE — ADVANCED PRACTICE NURSE CONSULT - RECOMMEDATIONS
Cleanse bilateral buttocks with soap and water.   Pat dry apply triad twice a day and prn for soiling.     Maintain pressure injury prevention.   Keep skin clean.   Maintain incontinence care.   Monitor skin for changes and notify provider   Case discussed with Nurse Manager via teams
Cleanse bilateral buttocks with soap and water.   Pat dry apply moisture barrier cream twice a day and prn for soiling (prevention).     Maintain pressure injury prevention.   Keep skin clean.   Maintain incontinence care.   Monitor skin for changes and notify provider   Case discussed with primary RN.

## 2024-12-02 NOTE — PROGRESS NOTE ADULT - SUBJECTIVE AND OBJECTIVE BOX
SUBJECTIVE/OVERNIGHT EVENTS  Today is hospital day 29d. This morning patient was seen and examined at bedside, resting comfortably in bed. No acute or major events overnight.      CODE STATUS:      PMH:  HTN (hypertension)    Diabetes mellitus    Obesity    Gastroesophageal reflux disease    Active asthma    Generalized OA    Afib          PSH:  H/O hernia repair    History of cholecystectomy          MEDICATIONS  STANDING MEDICATIONS  amLODIPine   Tablet 10 milliGRAM(s) Oral daily  chlorhexidine 0.12% Liquid 15 milliLiter(s) Oral Mucosa every 12 hours  chlorhexidine 2% Cloths 1 Application(s) Topical <User Schedule>  dextrose 5%. 1000 milliLiter(s) IV Continuous <Continuous>  enoxaparin Injectable 90 milliGRAM(s) SubCutaneous every 12 hours  ipratropium 17 MICROgram(s) HFA Inhaler 2 Puff(s) Inhalation every 6 hours  losartan 50 milliGRAM(s) Oral daily  nystatin Cream 1 Application(s) Topical two times a day  pantoprazole   Suspension 40 milliGRAM(s) Oral two times a day  vitamin A & D Ointment 1 Application(s) Topical two times a day    PRN MEDICATIONS  acetaminophen     Tablet .. 650 milliGRAM(s) Oral every 6 hours PRN  albuterol    90 MICROgram(s) HFA Inhaler 2 Puff(s) Inhalation every 6 hours PRN    VITALS  T(F): 97.5 (12-02-24 @ 11:47), Max: 98.5 (12-01-24 @ 20:27)  HR: 70 (12-02-24 @ 14:24) (61 - 75)  BP: 119/80 (12-02-24 @ 11:47) (112/60 - 124/73)  RR: 19 (12-02-24 @ 11:47) (19 - 20)  SpO2: 100% (12-02-24 @ 14:24) (100% - 100%)  POCT Blood Glucose.: 87 mg/dL (12-02-24 @ 11:33)  POCT Blood Glucose.: 85 mg/dL (12-02-24 @ 08:15)  POCT Blood Glucose.: 81 mg/dL (12-02-24 @ 06:50)  POCT Blood Glucose.: 87 mg/dL (12-02-24 @ 00:29)  POCT Blood Glucose.: 96 mg/dL (12-01-24 @ 18:29)    PHYSICAL EXAM  GENERAL  ( X ) NAD, lying in bed comfortably     (  ) obtunded     (  ) lethargic     (  ) somnolent    HEAD  ( X ) Atraumatic     (  ) hematoma     (  ) laceration (specify location:       )     NECK  ( X ) Supple     (  ) neck stiffness     (  ) nuchal rigidity     (  )  no JVD     (  ) JVD present ( -- cm)   ( X ) trach present, no erythema or secretions     HEART  Rate -->  ( X ) normal rate    (  ) bradycardic    (  ) tachycardic  Rhythm -->  ( X ) regular    (  ) regularly irregular    (  ) irregularly irregular  Murmurs -->  ( X ) normal s1/s2    (  ) systolic murmur    (  ) diastolic murmur    (  ) continuous murmur     (  ) S3 present    (  ) S4 present    LUNGS  Tracheostomy  ( X )Unlabored respirations     (  ) tachypnea  ( X ) B/L air entry     (  ) decreased breath sounds in:  (location     )    ( x ) no adventitious sound     (  ) crackles     (  ) wheezing      (  ) rhonchi      (specify location:       )  (  ) chest wall tenderness (specify location:       )    ABDOMEN  PEG tube  ( X ) Soft     (  ) tense   |   ( X ) nondistended     (  ) distended   |   ( X ) +BS     (  ) hypoactive bowel sounds     (  ) hyperactive bowel sounds  ( X ) nontender     (  ) RUQ tenderness     (  ) RLQ tenderness     (  ) LLQ tenderness     (  ) epigastric tenderness     (  ) diffuse tenderness  (  ) Splenomegaly      (  ) Hepatomegaly      (  ) Jaundice     (  ) ecchymosis       EXTREMITIES  ( X ) Normal     (  ) Rash     (  ) ecchymosis     (  ) varicose veins      (  ) pitting edema     (  ) non-pitting edema   (  ) ulceration     (  ) gangrene:     (location:     )    NERVOUS SYSTEM  (  ) A&Ox3     (  ) confused     (  ) lethargic  CN II-XII:     (  ) Intact     (  ) focal deficits  (Specify:     )   Upper extremities:     (  ) strength X/5     (  ) focal deficit (specify:    )  Lower extremities:     (  ) strength  X/5    (  ) focal deficit (specify:    )    SKIN  ( X ) No rashes or lesions     (  ) maculopapular rash     (  ) pustules     (  ) vesicles     (  ) ulcer     (  ) ecchymosis     (specify location:     )        LABS             8.2    8.90  )-----------( 197      ( 12-02-24 @ 07:02 )             26.3     145  |  113  |  13  -------------------------<  81   12-02-24 @ 07:02  3.4  |  23  |  0.7    Ca      9.8     12-02-24 @ 07:02  Mg     1.8     12-02-24 @ 07:02    TPro  5.2  /  Alb  3.0  /  TBili  0.9  /  DBili  x   /  AST  9   /  ALT  8   /  AlkPhos  85  /  GGT  x     12-02-24 @ 07:02        Urinalysis Basic - ( 02 Dec 2024 07:02 )    Color: x / Appearance: x / SG: x / pH: x  Gluc: 81 mg/dL / Ketone: x  / Bili: x / Urobili: x   Blood: x / Protein: x / Nitrite: x   Leuk Esterase: x / RBC: x / WBC x   Sq Epi: x / Non Sq Epi: x / Bacteria: x          IMAGING

## 2024-12-03 LAB
ALBUMIN SERPL ELPH-MCNC: 3.2 G/DL — LOW (ref 3.5–5.2)
ALP SERPL-CCNC: 91 U/L — SIGNIFICANT CHANGE UP (ref 30–115)
ALT FLD-CCNC: 9 U/L — SIGNIFICANT CHANGE UP (ref 0–41)
ANION GAP SERPL CALC-SCNC: 9 MMOL/L — SIGNIFICANT CHANGE UP (ref 7–14)
AST SERPL-CCNC: 9 U/L — SIGNIFICANT CHANGE UP (ref 0–41)
BASOPHILS # BLD AUTO: 0.07 K/UL — SIGNIFICANT CHANGE UP (ref 0–0.2)
BASOPHILS NFR BLD AUTO: 0.8 % — SIGNIFICANT CHANGE UP (ref 0–1)
BILIRUB SERPL-MCNC: 0.9 MG/DL — SIGNIFICANT CHANGE UP (ref 0.2–1.2)
BUN SERPL-MCNC: 11 MG/DL — SIGNIFICANT CHANGE UP (ref 10–20)
CALCIUM SERPL-MCNC: 9.9 MG/DL — SIGNIFICANT CHANGE UP (ref 8.4–10.5)
CHLORIDE SERPL-SCNC: 111 MMOL/L — HIGH (ref 98–110)
CO2 SERPL-SCNC: 21 MMOL/L — SIGNIFICANT CHANGE UP (ref 17–32)
CREAT SERPL-MCNC: 0.6 MG/DL — LOW (ref 0.7–1.5)
EGFR: 95 ML/MIN/1.73M2 — SIGNIFICANT CHANGE UP
EOSINOPHIL # BLD AUTO: 0.26 K/UL — SIGNIFICANT CHANGE UP (ref 0–0.7)
EOSINOPHIL NFR BLD AUTO: 2.8 % — SIGNIFICANT CHANGE UP (ref 0–8)
GLUCOSE BLDC GLUCOMTR-MCNC: 111 MG/DL — HIGH (ref 70–99)
GLUCOSE BLDC GLUCOMTR-MCNC: 84 MG/DL — SIGNIFICANT CHANGE UP (ref 70–99)
GLUCOSE BLDC GLUCOMTR-MCNC: 85 MG/DL — SIGNIFICANT CHANGE UP (ref 70–99)
GLUCOSE BLDC GLUCOMTR-MCNC: 89 MG/DL — SIGNIFICANT CHANGE UP (ref 70–99)
GLUCOSE BLDC GLUCOMTR-MCNC: 92 MG/DL — SIGNIFICANT CHANGE UP (ref 70–99)
GLUCOSE SERPL-MCNC: 91 MG/DL — SIGNIFICANT CHANGE UP (ref 70–99)
HCT VFR BLD CALC: 25.5 % — LOW (ref 37–47)
HGB BLD-MCNC: 8.3 G/DL — LOW (ref 12–16)
IMM GRANULOCYTES NFR BLD AUTO: 0.5 % — HIGH (ref 0.1–0.3)
LYMPHOCYTES # BLD AUTO: 1.87 K/UL — SIGNIFICANT CHANGE UP (ref 1.2–3.4)
LYMPHOCYTES # BLD AUTO: 20.3 % — LOW (ref 20.5–51.1)
MAGNESIUM SERPL-MCNC: 1.4 MG/DL — LOW (ref 1.8–2.4)
MCHC RBC-ENTMCNC: 30.3 PG — SIGNIFICANT CHANGE UP (ref 27–31)
MCHC RBC-ENTMCNC: 32.5 G/DL — SIGNIFICANT CHANGE UP (ref 32–37)
MCV RBC AUTO: 93.1 FL — SIGNIFICANT CHANGE UP (ref 81–99)
MONOCYTES # BLD AUTO: 0.59 K/UL — SIGNIFICANT CHANGE UP (ref 0.1–0.6)
MONOCYTES NFR BLD AUTO: 6.4 % — SIGNIFICANT CHANGE UP (ref 1.7–9.3)
NEUTROPHILS # BLD AUTO: 6.38 K/UL — SIGNIFICANT CHANGE UP (ref 1.4–6.5)
NEUTROPHILS NFR BLD AUTO: 69.2 % — SIGNIFICANT CHANGE UP (ref 42.2–75.2)
NRBC # BLD: 0 /100 WBCS — SIGNIFICANT CHANGE UP (ref 0–0)
PLATELET # BLD AUTO: 208 K/UL — SIGNIFICANT CHANGE UP (ref 130–400)
PMV BLD: 11.7 FL — HIGH (ref 7.4–10.4)
POTASSIUM SERPL-MCNC: 3.2 MMOL/L — LOW (ref 3.5–5)
POTASSIUM SERPL-SCNC: 3.2 MMOL/L — LOW (ref 3.5–5)
PROT SERPL-MCNC: 5.3 G/DL — LOW (ref 6–8)
RBC # BLD: 2.74 M/UL — LOW (ref 4.2–5.4)
RBC # FLD: 14 % — SIGNIFICANT CHANGE UP (ref 11.5–14.5)
SODIUM SERPL-SCNC: 141 MMOL/L — SIGNIFICANT CHANGE UP (ref 135–146)
WBC # BLD: 9.22 K/UL — SIGNIFICANT CHANGE UP (ref 4.8–10.8)
WBC # FLD AUTO: 9.22 K/UL — SIGNIFICANT CHANGE UP (ref 4.8–10.8)

## 2024-12-03 PROCEDURE — 99232 SBSQ HOSP IP/OBS MODERATE 35: CPT

## 2024-12-03 RX ORDER — POTASSIUM CHLORIDE 600 MG/1
40 TABLET, EXTENDED RELEASE ORAL ONCE
Refills: 0 | Status: COMPLETED | OUTPATIENT
Start: 2024-12-03 | End: 2024-12-03

## 2024-12-03 RX ADMIN — ENOXAPARIN SODIUM 90 MILLIGRAM(S): 30 INJECTION SUBCUTANEOUS at 17:18

## 2024-12-03 RX ADMIN — PANTOPRAZOLE SODIUM 40 MILLIGRAM(S): 40 TABLET, DELAYED RELEASE ORAL at 17:19

## 2024-12-03 RX ADMIN — NYSTATIN 1 APPLICATION(S): 100000 POWDER TOPICAL at 06:33

## 2024-12-03 RX ADMIN — Medication 1 APPLICATION(S): at 06:33

## 2024-12-03 RX ADMIN — Medication 25 GRAM(S): at 10:40

## 2024-12-03 RX ADMIN — Medication 25 GRAM(S): at 17:19

## 2024-12-03 RX ADMIN — Medication 25 GRAM(S): at 11:20

## 2024-12-03 RX ADMIN — NYSTATIN 1 APPLICATION(S): 100000 POWDER TOPICAL at 17:19

## 2024-12-03 RX ADMIN — CHLORHEXIDINE GLUCONATE 15 MILLILITER(S): 1.2 RINSE ORAL at 17:18

## 2024-12-03 RX ADMIN — LOSARTAN POTASSIUM 50 MILLIGRAM(S): 100 TABLET, FILM COATED ORAL at 06:34

## 2024-12-03 RX ADMIN — CHLORHEXIDINE GLUCONATE 15 MILLILITER(S): 1.2 RINSE ORAL at 06:33

## 2024-12-03 RX ADMIN — CHLORHEXIDINE GLUCONATE 1 APPLICATION(S): 1.2 RINSE ORAL at 06:34

## 2024-12-03 RX ADMIN — POTASSIUM CHLORIDE 40 MILLIEQUIVALENT(S): 600 TABLET, EXTENDED RELEASE ORAL at 10:40

## 2024-12-03 RX ADMIN — PANTOPRAZOLE SODIUM 40 MILLIGRAM(S): 40 TABLET, DELAYED RELEASE ORAL at 06:34

## 2024-12-03 RX ADMIN — Medication 1 APPLICATION(S): at 17:19

## 2024-12-03 RX ADMIN — Medication 2 PUFF(S): at 01:25

## 2024-12-03 RX ADMIN — AMLODIPINE BESYLATE 10 MILLIGRAM(S): 10 TABLET ORAL at 06:34

## 2024-12-03 RX ADMIN — ENOXAPARIN SODIUM 90 MILLIGRAM(S): 30 INJECTION SUBCUTANEOUS at 06:33

## 2024-12-03 RX ADMIN — Medication 25 GRAM(S): at 14:38

## 2024-12-03 NOTE — PROVIDER CONTACT NOTE (OTHER) - ACTION/TREATMENT ORDERED:
Feeds are now held and MD Roach made aware.
As per Dr. Colvin, okay to not give pt feeds for the AM shift of 12/1
Pt assessed at bedside by MD neel isabel. MD will reach out to surgery.
Surgery came to bedside reassessed pt and repacked the dressing around the trach site.
MD states he will be downstairs to put in new line or central line. MD ordered for the norepinephrine to continue to run through the L AC 18.

## 2024-12-03 NOTE — PROGRESS NOTE ADULT - SUBJECTIVE AND OBJECTIVE BOX
Patient is a 74y old  Female who presents with a chief complaint of abd pain (01 Dec 2024 08:52)        Over Night Events:  on MV         ROS:     All ROS are negative except HPI         PHYSICAL EXAM    ICU Vital Signs Last 24 Hrs  T(C): 36.9 (03 Dec 2024 05:00), Max: 36.9 (03 Dec 2024 05:00)  T(F): 98.4 (03 Dec 2024 05:00), Max: 98.4 (03 Dec 2024 05:00)  HR: 81 (03 Dec 2024 05:00) (58 - 81)  BP: 129/72 (03 Dec 2024 05:00) (109/65 - 129/72)  BP(mean): --  ABP: --  ABP(mean): --  RR: 17 (03 Dec 2024 05:00) (17 - 19)  SpO2: 98% (03 Dec 2024 05:00) (98% - 100%)    O2 Parameters below as of 03 Dec 2024 05:00  Patient On (Oxygen Delivery Method): ventilator            CONSTITUTIONAL:  NAD    ENT:   Airway patent,   Mouth with normal mucosa.   Trach     EYES:   Pupils equal,   Round and reactive to light.    CARDIAC:   Normal rate,   Regular rhythm.      RESPIRATORY:   No wheezing  Bilateral BS  Normal chest expansion  Not tachypneic,  No use of accessory muscles    GASTROINTESTINAL:  Abdomen soft,   Non-tender,   No guarding,   + BS    MUSCULOSKELETAL:   Range of motion is not limited,  No clubbing, cyanosis    SKIN:   Skin normal color for race,   Warm and dry   No evidence of rash.          12-01-24 @ 07:01  -  12-02-24 @ 07:00  --------------------------------------------------------  IN:  Total IN: 0 mL    OUT:    Rectal Tube (mL): 500 mL    Voided (mL): 1000 mL  Total OUT: 1500 mL    Total NET: -1500 mL          LABS:                            8.2    8.90  )-----------( 197      ( 02 Dec 2024 07:02 )             26.3                                               12-02    145  |  113[H]  |  13  ----------------------------<  81  3.4[L]   |  23  |  0.7    Ca    9.8      02 Dec 2024 07:02  Mg     1.8     12-02    TPro  5.2[L]  /  Alb  3.0[L]  /  TBili  0.9  /  DBili  x   /  AST  9   /  ALT  8   /  AlkPhos  85  12-02                                             Urinalysis Basic - ( 02 Dec 2024 07:02 )    Color: x / Appearance: x / SG: x / pH: x  Gluc: 81 mg/dL / Ketone: x  / Bili: x / Urobili: x   Blood: x / Protein: x / Nitrite: x   Leuk Esterase: x / RBC: x / WBC x   Sq Epi: x / Non Sq Epi: x / Bacteria: x                                                  LIVER FUNCTIONS - ( 02 Dec 2024 07:02 )  Alb: 3.0 g/dL / Pro: 5.2 g/dL / ALK PHOS: 85 U/L / ALT: 8 U/L / AST: 9 U/L / GGT: x                                                                                               Mode: AC/ CMV (Assist Control/ Continuous Mandatory Ventilation)  RR (machine): 14  TV (machine): 360  FiO2: 40  PEEP: 8  ITime: 0.8  MAP: 10  PIP: 19                                          MEDICATIONS  (STANDING):  amLODIPine   Tablet 10 milliGRAM(s) Oral daily  chlorhexidine 0.12% Liquid 15 milliLiter(s) Oral Mucosa every 12 hours  chlorhexidine 2% Cloths 1 Application(s) Topical <User Schedule>  dextrose 5%. 1000 milliLiter(s) (100 mL/Hr) IV Continuous <Continuous>  enoxaparin Injectable 90 milliGRAM(s) SubCutaneous every 12 hours  ipratropium 17 MICROgram(s) HFA Inhaler 2 Puff(s) Inhalation every 6 hours  losartan 50 milliGRAM(s) Oral daily  nystatin Cream 1 Application(s) Topical two times a day  pantoprazole   Suspension 40 milliGRAM(s) Oral two times a day  vitamin A & D Ointment 1 Application(s) Topical two times a day    MEDICATIONS  (PRN):  acetaminophen     Tablet .. 650 milliGRAM(s) Oral every 6 hours PRN Temp greater or equal to 38C (100.4F)  albuterol    90 MICROgram(s) HFA Inhaler 2 Puff(s) Inhalation every 6 hours PRN Shortness of Breath and/or Wheezing      New X-rays reviewed:                                                                                  ECHO

## 2024-12-03 NOTE — PROGRESS NOTE ADULT - ATTENDING COMMENTS
73-year-old female PMH A-fib on xarelto, HTN, s/p cholecystectomy, congenital solitary kidney presents from NH to the ED for evaluation of weakness, decreased PO intake and abdominal distension.     Admitted to ICU for septic shock requiring pressors 2/2 ESBL proteus (UTI vs. GI source) complicated by gastric distension w/ pneumatosis, NOLA 2/2 ATN, hypernatremia. s/p NGT decompression (-3.1 L feculent output) in ED. Intubated 11/6 for AHRF. EGD 11/8 w/ multiple ulcers, largest 10cm, path +gastritis. Now with new R colon/cecal mass pending colonoscopy when more stable.   Extubated 11/13 to AVAPs, weaned to HFNC however transitioned back to BiPAP after desaturation. Patient was weak and unable to clear secretions despite non-invasive measures. Reintubated 11/18 PM, s/p trach/PEJ 11/19 with thoracic sx.    # Norovirus Diarrhea- GI PCR on 11/23 and 1/24- positive x 2 , GI PCR ,  on contact isolation , supportive tx.  - stool cdiff on 11/23 neg  -11/29- still with persistent watery diarrhea noted in dignishield   12/1: Diarrhea persists. Can hold Feeds for 24 hours per patient's wishes and see if diarrhea improves with that. c/w Fingersticks Q6, Vitals monitoring. If needed, low dose maintenance fluids until tomorrow.   12/2: Diarrhea partially improved but still persists. Per Nutrition Support can change J feeds to Peptamen 1.5 @ 70cc/hr from 2pm to 6am every day.   This formula is completely absorbed in small intestine. So whatever diarrhea persists now can be attributed to colonic output from Norovirus.   Watch the diarrhea in Dignishield bag.   12/3: Diarrhea persists. At this point it can be attributed to Norovirus. And not the feeds (as detailed above)  Patient still has to be convinced everyday to allow the feeds.     #AHRF 2/2 septic shock due to ESBL proteus (UTI Vs. GI source) s/p intubation x2, s/p trach 11/19  #HAGMA 2/2 lactic acidosis and uremia - resolved   #NOLA likely ATN 2/2 septic shock - resolved   #Possible aspiration  #Congenital solitary kidney  - Intubated 11/6, extubated 11/13 to AVAPS, re-intubated 11/18, s/p trach 11/19  - UCx +ESBL proteus, BCX (-), trach culture with resp rosalva, sputum cx 11/12 resp rosalva  - off pressors, off sedation  - s/p abx (finished 11/13)  - c/w mechanical ventilation, SBT as tolerated   - pain management,  chest PT/MDI q6h  - post bronch on 11/20- neg , f/u bronch cx/gram stain (11/18-neg , 11/19- neg )      #Dysphagia 2/2 critical illness s/p PEJ  - PEJ w/ thoracic 11/19- tolerating feedings well  J port clogged (11/29). Try Clog Zapper. Or call Thoracic Surgery.    #R colon/cecal mass on CT A/P  #Multiple gastric ulcers iso chronic gastritis   #Abdominal distension w/ pneumatosis - resolved   #Diarrhea - recurrent on 11/23- obtained GI PCR  - CT A/P on admission with severe gastric distension with new pneumatosis along posterior gastric wall suspicious for ischemia, foci of free intraperitoneal air ; suspicious portal venous gas; thick bladder wall (cystitis vs colovesicular fistula)   - s/p EGD 11/8 with multiple ulcers, erosive gastritis:  EGD path: chronic gastritis (-) H pylori, no malignancy   - repeated CT A/P with PO contrast 11.8 w/ suspicious hepatic flexure narrowing and lobulated soft tissue density in R colon/cecum.   -  Cdiff (-)  -  PPI BID   - colonoscopy when more stable as per GI    #Hypernatremia- corrected . free water via peg, daily BMP  # Hypomagnesemia - supplemented     #Sacral DTI - offloading, wound care, pressure injury prevention    #Afib- resumed on therapeutic lovenox tx.    #HTN - amlodipine 10mg qd PO, losartan 50mg qd PO (home meds)    #TRacheostomy bleeding: noted on 11/30, s/p surgicel by surgery. Stopped as of 12/1. Increased lovenox back from prophylactic to therapeutic dose.      DVT ppx:  on lovenox tx.    Diet: NPO w/ PEJ feeding  GI ppx/Bowel regimen: PPI BID    Code status: full code    Family discussion: current patient's condition and medical plan of care d/w family by bedside    #Progress Note Handoff: monitor for resolution of diarrhea,  electrolytes supplement PRN,  vent management as per Pulm. team    Disposition: SNF after diarrhea improves.

## 2024-12-03 NOTE — PROGRESS NOTE ADULT - SUBJECTIVE AND OBJECTIVE BOX
SUBJECTIVE/OVERNIGHT EVENTS  Today is hospital day 30d. This morning patient was seen and examined at bedside, resting comfortably in bed. No acute or major events overnight.      CODE STATUS:      PMH:  HTN (hypertension)    Diabetes mellitus    Obesity    Gastroesophageal reflux disease    Active asthma    Generalized OA    Afib          PSH:  H/O hernia repair    History of cholecystectomy          MEDICATIONS  STANDING MEDICATIONS  amLODIPine   Tablet 10 milliGRAM(s) Oral daily  chlorhexidine 0.12% Liquid 15 milliLiter(s) Oral Mucosa every 12 hours  chlorhexidine 2% Cloths 1 Application(s) Topical <User Schedule>  dextrose 5%. 1000 milliLiter(s) IV Continuous <Continuous>  enoxaparin Injectable 90 milliGRAM(s) SubCutaneous every 12 hours  ipratropium 17 MICROgram(s) HFA Inhaler 2 Puff(s) Inhalation every 6 hours  losartan 50 milliGRAM(s) Oral daily  magnesium sulfate  IVPB 2 Gram(s) IV Intermittent every 2 hours  nystatin Cream 1 Application(s) Topical two times a day  pantoprazole   Suspension 40 milliGRAM(s) Oral two times a day  vitamin A & D Ointment 1 Application(s) Topical two times a day    PRN MEDICATIONS  acetaminophen     Tablet .. 650 milliGRAM(s) Oral every 6 hours PRN  albuterol    90 MICROgram(s) HFA Inhaler 2 Puff(s) Inhalation every 6 hours PRN    VITALS  T(F): 97.8 (12-03-24 @ 12:14), Max: 98.4 (12-03-24 @ 05:00)  HR: 79 (12-03-24 @ 12:14) (58 - 81)  BP: 130/70 (12-03-24 @ 12:14) (109/65 - 130/70)  RR: 18 (12-03-24 @ 12:14) (17 - 18)  SpO2: 100% (12-03-24 @ 12:14) (98% - 100%)  POCT Blood Glucose.: 85 mg/dL (12-03-24 @ 11:48)  POCT Blood Glucose.: 89 mg/dL (12-03-24 @ 06:32)  POCT Blood Glucose.: 84 mg/dL (12-02-24 @ 23:58)  POCT Blood Glucose.: 135 mg/dL (12-02-24 @ 17:12)    PHYSICAL EXAM  GENERAL  ( X ) NAD, lying in bed comfortably     (  ) obtunded     (  ) lethargic     (  ) somnolent    HEAD  ( X ) Atraumatic     (  ) hematoma     (  ) laceration (specify location:       )     NECK  ( X ) Supple     (  ) neck stiffness     (  ) nuchal rigidity     (  )  no JVD     (  ) JVD present ( -- cm)   ( X ) trach present, no erythema or secretions     HEART  Rate -->  ( X ) normal rate    (  ) bradycardic    (  ) tachycardic  Rhythm -->  ( X ) regular    (  ) regularly irregular    (  ) irregularly irregular  Murmurs -->  ( X ) normal s1/s2    (  ) systolic murmur    (  ) diastolic murmur    (  ) continuous murmur     (  ) S3 present    (  ) S4 present    LUNGS  Tracheostomy  ( X )Unlabored respirations     (  ) tachypnea  ( X ) B/L air entry     (  ) decreased breath sounds in:  (location     )    ( x ) no adventitious sound     (  ) crackles     (  ) wheezing      (  ) rhonchi      (specify location:       )  (  ) chest wall tenderness (specify location:       )    ABDOMEN  PEG tube  ( X ) Soft     (  ) tense   |   ( X ) nondistended     (  ) distended   |   ( X ) +BS     (  ) hypoactive bowel sounds     (  ) hyperactive bowel sounds  ( X ) nontender     (  ) RUQ tenderness     (  ) RLQ tenderness     (  ) LLQ tenderness     (  ) epigastric tenderness     (  ) diffuse tenderness  (  ) Splenomegaly      (  ) Hepatomegaly      (  ) Jaundice     (  ) ecchymosis       EXTREMITIES  ( X ) Normal     (  ) Rash     (  ) ecchymosis     (  ) varicose veins      (  ) pitting edema     (  ) non-pitting edema   (  ) ulceration     (  ) gangrene:     (location:     )    NERVOUS SYSTEM  (  ) A&Ox3     (  ) confused     (  ) lethargic  CN II-XII:     (  ) Intact     (  ) focal deficits  (Specify:     )   Upper extremities:     (  ) strength X/5     (  ) focal deficit (specify:    )  Lower extremities:     (  ) strength  X/5    (  ) focal deficit (specify:    )    SKIN  ( X ) No rashes or lesions     (  ) maculopapular rash     (  ) pustules     (  ) vesicles     (  ) ulcer     (  ) ecchymosis     (specify location:     )    LABS             8.3    9.22  )-----------( 208      ( 12-03-24 @ 07:38 )             25.5     141  |  111  |  11  -------------------------<  91   12-03-24 @ 07:38  3.2  |  21  |  0.6    Ca      9.9     12-03-24 @ 07:38  Mg     1.4     12-03-24 @ 07:38    TPro  5.3  /  Alb  3.2  /  TBili  0.9  /  DBili  x   /  AST  9   /  ALT  9   /  AlkPhos  91  /  GGT  x     12-03-24 @ 07:38        Urinalysis Basic - ( 03 Dec 2024 07:38 )    Color: x / Appearance: x / SG: x / pH: x  Gluc: 91 mg/dL / Ketone: x  / Bili: x / Urobili: x   Blood: x / Protein: x / Nitrite: x   Leuk Esterase: x / RBC: x / WBC x   Sq Epi: x / Non Sq Epi: x / Bacteria: x

## 2024-12-03 NOTE — PROVIDER CONTACT NOTE (OTHER) - DATE AND TIME:
01-Dec-2024 20:47
04-Nov-2024 02:39
30-Nov-2024 02:44
29-Nov-2024 23:02
30-Nov-2024 01:40
03-Dec-2024 18:40
29-Nov-2024 23:56

## 2024-12-03 NOTE — PROVIDER CONTACT NOTE (OTHER) - ASSESSMENT
Pt now refusing to have feeds continued as of 18:30. Pt states "she has rights" via communication board when asked why she is refusing the feeds.

## 2024-12-03 NOTE — PROVIDER CONTACT NOTE (OTHER) - REASON
Trach site bleeding
Trach site bleeding
bruising at IV site
pt is refusing peg feed
Blood around trach site
Trach site bleeding
Pt refusing her feeds to be continued

## 2024-12-03 NOTE — PROVIDER CONTACT NOTE (OTHER) - BACKGROUND
Pt trach site is still bleeding.
Pt agreed to have two bottles of Peptamen AF to be administered after discussion with MD Colvin
Call surgery at [phone] if trach site is still bleeding.
pt is A/O x 4, wrote on communication board "no more feeds"
Pt is trach to vent.
Pt on norepi. for hypotensive crisis when seen in ED.

## 2024-12-03 NOTE — PROVIDER CONTACT NOTE (OTHER) - SITUATION
Pt arm was seen to be bruised around IV site with norepinephrine infusing.
Pt found to have blood coming from the bottom of the trach site. Site care done. Drain sponge replaced.
Pt packing around trach site was saturated with blood.
Pt saturated drainspounge. Drainsponge replaced, site care done, and gauze applied around trach site.
Pt 4x4 packing around trach was saturated with blood. Surgery was called at [phone]. Surgery came to assess pt at bedside and repacked the dressing around trach.
Pt has an order for continuous feed of Peptamen AF 70ml/hr.
Pt refused 12 pm scheduled PEG feed

## 2024-12-03 NOTE — PROGRESS NOTE ADULT - ASSESSMENT
IMPRESSION:    Acute hypoxemic respiratory failure SP trach and PEG   Left lung atelectasis SP multiple bronchoscopies   Septic shock - resolved  UTI  Suspected colon mass  Severe gastric distension w/ pneumatosis along posterior gastric wall resolved  Possible aspiration / LLL consolidation  Thrombocytopenia improved   hypernatremia  Gastric ulcer   NOLA resolved  Afib on Xarelto  HO HTN  HO ESBL Proteus    PLAN:    CNS:  Pain control as needed .     HEENT: Oral care. Trach care.  CTS follow up noted     PULMONARY: Vent changes;  Wean o2 as tolerated.  40%,  PEEP 8.  Keep O2 sat 92-96%.  Pulmonary toilet.  CXR noted.  PSV 2-4 hours as tolerated      CARDIOVASCULAR: Avoid overload.     GI: GI prophylaxis.  Tube feeding.  Bowel regimen.      RENAL:  Follow up lytes. Correct as needed.  Free H2O to correct free H2O deficit     INFECTIOUS DISEASE: SP Meropenem course.  Monitor VS     HEMATOLOGICAL: Therapeutic AC.  Monitor CBC.      ENDOCRINE:  Follow up FS. Insulin protocol if needed.    MUSCULOSKELETAL: Bed chair position.  Off loading    Full code.   Vent unit  DC planing

## 2024-12-04 LAB
ALBUMIN SERPL ELPH-MCNC: 3.1 G/DL — LOW (ref 3.5–5.2)
ALP SERPL-CCNC: 93 U/L — SIGNIFICANT CHANGE UP (ref 30–115)
ALT FLD-CCNC: 9 U/L — SIGNIFICANT CHANGE UP (ref 0–41)
ANION GAP SERPL CALC-SCNC: 11 MMOL/L — SIGNIFICANT CHANGE UP (ref 7–14)
AST SERPL-CCNC: 10 U/L — SIGNIFICANT CHANGE UP (ref 0–41)
BASOPHILS # BLD AUTO: 0.08 K/UL — SIGNIFICANT CHANGE UP (ref 0–0.2)
BASOPHILS NFR BLD AUTO: 0.9 % — SIGNIFICANT CHANGE UP (ref 0–1)
BILIRUB SERPL-MCNC: 0.8 MG/DL — SIGNIFICANT CHANGE UP (ref 0.2–1.2)
BUN SERPL-MCNC: 12 MG/DL — SIGNIFICANT CHANGE UP (ref 10–20)
CALCIUM SERPL-MCNC: 10.4 MG/DL — SIGNIFICANT CHANGE UP (ref 8.4–10.5)
CHLORIDE SERPL-SCNC: 111 MMOL/L — HIGH (ref 98–110)
CO2 SERPL-SCNC: 18 MMOL/L — SIGNIFICANT CHANGE UP (ref 17–32)
CREAT SERPL-MCNC: 0.8 MG/DL — SIGNIFICANT CHANGE UP (ref 0.7–1.5)
EGFR: 78 ML/MIN/1.73M2 — SIGNIFICANT CHANGE UP
EOSINOPHIL # BLD AUTO: 0.29 K/UL — SIGNIFICANT CHANGE UP (ref 0–0.7)
EOSINOPHIL NFR BLD AUTO: 3.4 % — SIGNIFICANT CHANGE UP (ref 0–8)
GLUCOSE BLDC GLUCOMTR-MCNC: 120 MG/DL — HIGH (ref 70–99)
GLUCOSE BLDC GLUCOMTR-MCNC: 90 MG/DL — SIGNIFICANT CHANGE UP (ref 70–99)
GLUCOSE BLDC GLUCOMTR-MCNC: 95 MG/DL — SIGNIFICANT CHANGE UP (ref 70–99)
GLUCOSE SERPL-MCNC: 90 MG/DL — SIGNIFICANT CHANGE UP (ref 70–99)
HCT VFR BLD CALC: 26.4 % — LOW (ref 37–47)
HGB BLD-MCNC: 8.5 G/DL — LOW (ref 12–16)
IMM GRANULOCYTES NFR BLD AUTO: 0.7 % — HIGH (ref 0.1–0.3)
LYMPHOCYTES # BLD AUTO: 2.03 K/UL — SIGNIFICANT CHANGE UP (ref 1.2–3.4)
LYMPHOCYTES # BLD AUTO: 23.5 % — SIGNIFICANT CHANGE UP (ref 20.5–51.1)
MAGNESIUM SERPL-MCNC: 3 MG/DL — HIGH (ref 1.8–2.4)
MCHC RBC-ENTMCNC: 30.1 PG — SIGNIFICANT CHANGE UP (ref 27–31)
MCHC RBC-ENTMCNC: 32.2 G/DL — SIGNIFICANT CHANGE UP (ref 32–37)
MCV RBC AUTO: 93.6 FL — SIGNIFICANT CHANGE UP (ref 81–99)
MONOCYTES # BLD AUTO: 0.57 K/UL — SIGNIFICANT CHANGE UP (ref 0.1–0.6)
MONOCYTES NFR BLD AUTO: 6.6 % — SIGNIFICANT CHANGE UP (ref 1.7–9.3)
NEUTROPHILS # BLD AUTO: 5.61 K/UL — SIGNIFICANT CHANGE UP (ref 1.4–6.5)
NEUTROPHILS NFR BLD AUTO: 64.9 % — SIGNIFICANT CHANGE UP (ref 42.2–75.2)
NRBC # BLD: 0 /100 WBCS — SIGNIFICANT CHANGE UP (ref 0–0)
PLATELET # BLD AUTO: 216 K/UL — SIGNIFICANT CHANGE UP (ref 130–400)
PMV BLD: 11.8 FL — HIGH (ref 7.4–10.4)
POTASSIUM SERPL-MCNC: 3.6 MMOL/L — SIGNIFICANT CHANGE UP (ref 3.5–5)
POTASSIUM SERPL-SCNC: 3.6 MMOL/L — SIGNIFICANT CHANGE UP (ref 3.5–5)
PROT SERPL-MCNC: 5.5 G/DL — LOW (ref 6–8)
RBC # BLD: 2.82 M/UL — LOW (ref 4.2–5.4)
RBC # FLD: 14.4 % — SIGNIFICANT CHANGE UP (ref 11.5–14.5)
SODIUM SERPL-SCNC: 140 MMOL/L — SIGNIFICANT CHANGE UP (ref 135–146)
WBC # BLD: 8.64 K/UL — SIGNIFICANT CHANGE UP (ref 4.8–10.8)
WBC # FLD AUTO: 8.64 K/UL — SIGNIFICANT CHANGE UP (ref 4.8–10.8)

## 2024-12-04 PROCEDURE — 99232 SBSQ HOSP IP/OBS MODERATE 35: CPT

## 2024-12-04 RX ORDER — POTASSIUM CHLORIDE 600 MG/1
20 TABLET, EXTENDED RELEASE ORAL ONCE
Refills: 0 | Status: COMPLETED | OUTPATIENT
Start: 2024-12-04 | End: 2024-12-08

## 2024-12-04 RX ADMIN — Medication 1 APPLICATION(S): at 05:39

## 2024-12-04 RX ADMIN — Medication 2 MILLIGRAM(S): at 16:45

## 2024-12-04 RX ADMIN — NYSTATIN 1 APPLICATION(S): 100000 POWDER TOPICAL at 17:50

## 2024-12-04 RX ADMIN — AMLODIPINE BESYLATE 10 MILLIGRAM(S): 10 TABLET ORAL at 05:40

## 2024-12-04 RX ADMIN — NYSTATIN 1 APPLICATION(S): 100000 POWDER TOPICAL at 05:39

## 2024-12-04 RX ADMIN — Medication 1 APPLICATION(S): at 17:50

## 2024-12-04 RX ADMIN — CHLORHEXIDINE GLUCONATE 15 MILLILITER(S): 1.2 RINSE ORAL at 05:38

## 2024-12-04 RX ADMIN — ENOXAPARIN SODIUM 90 MILLIGRAM(S): 30 INJECTION SUBCUTANEOUS at 17:51

## 2024-12-04 RX ADMIN — ENOXAPARIN SODIUM 90 MILLIGRAM(S): 30 INJECTION SUBCUTANEOUS at 05:39

## 2024-12-04 RX ADMIN — PANTOPRAZOLE SODIUM 40 MILLIGRAM(S): 40 TABLET, DELAYED RELEASE ORAL at 17:51

## 2024-12-04 RX ADMIN — CHLORHEXIDINE GLUCONATE 1 APPLICATION(S): 1.2 RINSE ORAL at 05:40

## 2024-12-04 RX ADMIN — CHLORHEXIDINE GLUCONATE 15 MILLILITER(S): 1.2 RINSE ORAL at 17:50

## 2024-12-04 RX ADMIN — LOSARTAN POTASSIUM 50 MILLIGRAM(S): 100 TABLET, FILM COATED ORAL at 05:40

## 2024-12-04 RX ADMIN — PANTOPRAZOLE SODIUM 40 MILLIGRAM(S): 40 TABLET, DELAYED RELEASE ORAL at 05:39

## 2024-12-04 NOTE — PROGRESS NOTE ADULT - ASSESSMENT
# Norovirus Diarrhea- GI PCR on 11/23 and 1/24- positive x 2 , stool cdiff on 11/23 neg  #AHRF 2/2 septic shock due to ESBL proteus (UTI Vs. GI source) s/p intubation x2, s/p trach 11/19  #HAGMA 2/2 lactic acidosis and uremia - resolved   #NOLA likely ATN 2/2 septic shock - resolved   #Possible aspiration  #Congenital solitary kidney  #Dysphagia 2/2 critical illness s/p G-J placement  #R colon/cecal mass on CT A/P  #Multiple gastric ulcers and chronic gastritis   #Abdominal distension w/ pneumatosis - resolved   #Sacral DTI  #Afib  #HTN     suggest:  - change feeds temporarily to Peptamen 1.5 ( not the AF, please) at 70 ml/h x 16 h (2pm to 6 am) via jejunal port of GJ    - suspect loose stool is colonic in origin and not r/t food, but giving a hydrolyzed formula removes the issue from consideration.   - replete Mg and K via IV fluid  - was A1c ever done? If fingerstick glc repeatedly wnl, stop checking fingersticks  - spoke with ID regarding stool-thickening agents (not hypomotility meds)    - unclear if Banatrol ever given as it is not documented in I&O or nursing notes - will place it in I&O flowsheet and remind nurses.    - then start half-dose cholestyramine via Gastric port twice a day at 2 pm and 10 pm    - can give Banatrol TF via either port up to 3 times a day prn loose stool, but need to document when it's given

## 2024-12-04 NOTE — PROGRESS NOTE ADULT - SUBJECTIVE AND OBJECTIVE BOX
NUTRITION SUPPORT TEAM  -  PROGRESS NOTE     Interval Events:        VITALS:  T(F): 98.3 (12-04 @ 12:00), Max: 98.3 (12-04 @ 12:00)  HR: 78 (12-04 @ 12:00) (65 - 78)  BP: 128/74 (12-04 @ 12:00) (117/57 - 128/74)  RR: 18 (12-04 @ 12:00) (18 - 18)  SpO2: 100% (12-04 @ 12:00) (100% - 100%)    HEIGHT/WEIGHT/BMI:   Height (cm): 167.6 (11-20)  Weight (kg): 90.2 (11-20)  BMI (kg/m2): 32.1 (11-20)    I/Os:     12-03-24 @ 07:01  -  12-04-24 @ 07:00  --------------------------------------------------------  IN:    Enteral Tube Flush: 200 mL    Peptamen A.F.: 455 mL  Total IN: 655 mL    OUT:    Rectal Tube (mL): 700 mL  Total OUT: 700 mL    Total NET: -45 mL          PHYSICAL EXAM:   GENERAL: NAD, well-groomed, well-developed  HEENT: Moist mucous membranes, Good dentition, No lesions  ABDOMEN: Soft, Nontender, Nondistended  EXTREMITIES:  No clubbing, cyanosis, or edema  SKIN: warm and well perfused; No obvious rashes or lesions  IV ACCESS:   ENTERAL ACCESS:     STANDING MEDICATIONS:   amLODIPine   Tablet 10 milliGRAM(s) Oral daily  chlorhexidine 0.12% Liquid 15 milliLiter(s) Oral Mucosa every 12 hours  chlorhexidine 2% Cloths 1 Application(s) Topical <User Schedule>  dextrose 5%. 1000 milliLiter(s) IV Continuous <Continuous>  enoxaparin Injectable 90 milliGRAM(s) SubCutaneous every 12 hours  losartan 50 milliGRAM(s) Oral daily  nystatin Cream 1 Application(s) Topical two times a day  pantoprazole   Suspension 40 milliGRAM(s) Oral two times a day  potassium chloride   Powder 20 milliEquivalent(s) Oral once  vitamin A & D Ointment 1 Application(s) Topical two times a day      LABS:                         8.5    8.64  )-----------( 216      ( 04 Dec 2024 08:01 )             26.4     140  |  111[H]  |  12  ----------------------------<  90          (12-04-24 @ 08:01)  3.6   |  18  |  0.8    Ca    10.4          (12-04-24 @ 08:01)  Mg     3.0         (12-04-24 @ 08:01)    TPro  5.5[L]  /  Alb  3.1[L]  /  TBili  0.8  /  DBili  x   /  AST  10  /  ALT  9   /  AlkPhos  93       12-04-24 @ 08:01      Triglycerides, Serum: 178 mg/dL (11-20 @ 04:32)    Prealbumin, Serum:   Vitamin D, 25-Hydroxy:   Folate:   Vitamin B12, Serum:   Zinc Level, Plasma:   CRP:     Blood Glucose (Past 24 hours):  90 mg/dL (12-04 @ 11:46)  95 mg/dL (12-04 @ 05:56)  92 mg/dL (12-03 @ 23:51)  111 mg/dL (12-03 @ 17:03)      DIET:   Diet, NPO with Tube Feed:   Tube Feeding Modality: Gastro-Jejunostomy  Peptamen A.F. Formula (PEPTAMENAFRTH)  Continuous  Starting Tube Feed Rate mL per Hour: 25  Until Goal Tube Feed Rate (mL per Hour): 70  Tube Feed Duration (in Hours): 24  Tube Feed Start Time: 14:00  Tube Feed Stop Time: 06:00  Free Water Flush Instructions:  100 mL q4hrs; Please flush with 30 mL pre/post no carb prosource  Banatrol TF     Qty per Day:  3 (12-02-24 @ 15:58) [Active]          RADIOLOGY:    NUTRITION SUPPORT TEAM  -  PROGRESS NOTE     Interval Events:  alert, afebrile, responsive  + trach, + vent  abd soft, NOT tender, G-J in place  + dignishield, output less than previously, but pt has been refusing feeding    VITALS:  T(F): 98.3 (12-04 @ 12:00), Max: 98.3 (12-04 @ 12:00)  HR: 78 (12-04 @ 12:00) (65 - 78)  BP: 128/74 (12-04 @ 12:00) (117/57 - 128/74)  RR: 18 (12-04 @ 12:00) (18 - 18)  SpO2: 100% (12-04 @ 12:00) (100% - 100%)    HEIGHT/WEIGHT/BMI:   Height (cm): 167.6 (11-20)  Weight (kg): 90.2 (11-20)  BMI (kg/m2): 32.1 (11-20)    I/Os:   12-03-24 @ 07:01  -  12-04-24 @ 07:00  --------------------------------------------------------  IN:    Enteral Tube Flush: 200 mL    Peptamen A.F.: 455 mL  Total IN: 655 mL  OUT:    Rectal Tube (mL): 700 mL  Total OUT: 700 mL  Total NET: -45 mL    STANDING MEDICATIONS:   amLODIPine   Tablet 10 milliGRAM(s) Oral daily  chlorhexidine 0.12% Liquid 15 milliLiter(s) Oral Mucosa every 12 hours  chlorhexidine 2% Cloths 1 Application(s) Topical <User Schedule>  dextrose 5%. 1000 milliLiter(s) IV Continuous <Continuous>  enoxaparin Injectable 90 milliGRAM(s) SubCutaneous every 12 hours  losartan 50 milliGRAM(s) Oral daily  nystatin Cream 1 Application(s) Topical two times a day  pantoprazole   Suspension 40 milliGRAM(s) Oral two times a day  potassium chloride   Powder 20 milliEquivalent(s) Oral once  vitamin A & D Ointment 1 Application(s) Topical two times a day    Mode: AC/ CMV (Assist Control/ Continuous Mandatory Ventilation)  RR (machine): 14  TV (machine): 400  FiO2: 40  PEEP: 8  ITime: 0.8  MAP: 11  PIP: 19    LABS:                         8.5    8.64  )-----------( 216      ( 04 Dec 2024 08:01 )             26.4     Mean Cell Volume: 93.6 fL  Mean Cell Hemoglobin: 30.1 pg  Mean Cell Hemoglobin Conc: 32.2 g/dL  Red Cell Distrib Width: 14.4 %    140  |  111[H]  |  12  ----------------------------<  90          (12-04-24 @ 08:01)  3.6   |  18  |  0.8    Ca    10.4          (12-04-24 @ 08:01)  Mg     3.0         (12-04-24 @ 08:01)    TPro  5.5[L]  /  Alb  3.1[L]  /  TBili  0.8  /  DBili  x   /  AST  10  /  ALT  9   /  AlkPhos  93       12-04-24 @ 08:01      Triglycerides, Serum: 178 mg/dL (11-20 @ 04:32)   Vitamin D, 25-Hydroxy:   Zinc Level, Plasma:   no A1c this admission    Blood Glucose (Past 24 hours):  90 mg/dL (12-04 @ 11:46)  95 mg/dL (12-04 @ 05:56)  92 mg/dL (12-03 @ 23:51)  111 mg/dL (12-03 @ 17:03)      DIET:   Diet, NPO with Tube Feed:   Tube Feeding Modality: Gastro-Jejunostomy  Peptamen A.F. Formula (PEPTAMENAFRTH)  Continuous  Starting Tube Feed Rate mL per Hour: 25  Until Goal Tube Feed Rate (mL per Hour): 70  Tube Feed Duration (in Hours): 24  Tube Feed Start Time: 14:00  Tube Feed Stop Time: 06:00  Free Water Flush Instructions:  100 mL q4hrs; Please flush with 30 mL pre/post no carb prosource  Banatrol TF     Qty per Day:  3 (12-02-24 @ 15:58) [Active]

## 2024-12-04 NOTE — PROGRESS NOTE ADULT - SUBJECTIVE AND OBJECTIVE BOX
SUBJECTIVE/OVERNIGHT EVENTS  Today is hospital day 31d. This morning patient was seen and examined at bedside, resting comfortably in bed. No acute or major events overnight.      CODE STATUS:      PMH:  HTN (hypertension)    Diabetes mellitus    Obesity    Gastroesophageal reflux disease    Active asthma    Generalized OA    Afib          PSH:  H/O hernia repair    History of cholecystectomy          MEDICATIONS  STANDING MEDICATIONS  amLODIPine   Tablet 10 milliGRAM(s) Oral daily  chlorhexidine 0.12% Liquid 15 milliLiter(s) Oral Mucosa every 12 hours  chlorhexidine 2% Cloths 1 Application(s) Topical <User Schedule>  dextrose 5%. 1000 milliLiter(s) IV Continuous <Continuous>  enoxaparin Injectable 90 milliGRAM(s) SubCutaneous every 12 hours  losartan 50 milliGRAM(s) Oral daily  nystatin Cream 1 Application(s) Topical two times a day  pantoprazole   Suspension 40 milliGRAM(s) Oral two times a day  potassium chloride   Powder 20 milliEquivalent(s) Oral once  vitamin A & D Ointment 1 Application(s) Topical two times a day    PRN MEDICATIONS  acetaminophen     Tablet .. 650 milliGRAM(s) Oral every 6 hours PRN  albuterol    90 MICROgram(s) HFA Inhaler 2 Puff(s) Inhalation every 6 hours PRN  ipratropium 17 MICROgram(s) HFA Inhaler 2 Puff(s) Inhalation every 6 hours PRN    VITALS  T(F): 98.3 (12-04-24 @ 12:00), Max: 98.3 (12-04-24 @ 12:00)  HR: 78 (12-04-24 @ 12:00) (63 - 78)  BP: 128/74 (12-04-24 @ 12:00) (111/65 - 128/74)  RR: 18 (12-04-24 @ 12:00) (18 - 18)  SpO2: 100% (12-04-24 @ 12:00) (98% - 100%)  POCT Blood Glucose.: 90 mg/dL (12-04-24 @ 11:46)  POCT Blood Glucose.: 95 mg/dL (12-04-24 @ 05:56)  POCT Blood Glucose.: 92 mg/dL (12-03-24 @ 23:51)  POCT Blood Glucose.: 111 mg/dL (12-03-24 @ 17:03)    PHYSICAL EXAM  GENERAL  ( X ) NAD, lying in bed comfortably     (  ) obtunded     (  ) lethargic     (  ) somnolent    HEAD  ( X ) Atraumatic     (  ) hematoma     (  ) laceration (specify location:       )     NECK  ( X ) Supple     (  ) neck stiffness     (  ) nuchal rigidity     (  )  no JVD     (  ) JVD present ( -- cm)   ( X ) trach present, no erythema or secretions     HEART  Rate -->  ( X ) normal rate    (  ) bradycardic    (  ) tachycardic  Rhythm -->  ( X ) regular    (  ) regularly irregular    (  ) irregularly irregular  Murmurs -->  ( X ) normal s1/s2    (  ) systolic murmur    (  ) diastolic murmur    (  ) continuous murmur     (  ) S3 present    (  ) S4 present    LUNGS  Tracheostomy  ( X )Unlabored respirations     (  ) tachypnea  ( X ) B/L air entry     (  ) decreased breath sounds in:  (location     )    ( x ) no adventitious sound     (  ) crackles     (  ) wheezing      (  ) rhonchi      (specify location:       )  (  ) chest wall tenderness (specify location:       )    ABDOMEN  PEG tube  ( X ) Soft     (  ) tense   |   ( X ) nondistended     (  ) distended   |   ( X ) +BS     (  ) hypoactive bowel sounds     (  ) hyperactive bowel sounds  ( X ) nontender     (  ) RUQ tenderness     (  ) RLQ tenderness     (  ) LLQ tenderness     (  ) epigastric tenderness     (  ) diffuse tenderness  (  ) Splenomegaly      (  ) Hepatomegaly      (  ) Jaundice     (  ) ecchymosis       EXTREMITIES  ( X ) Normal     (  ) Rash     (  ) ecchymosis     (  ) varicose veins      (  ) pitting edema     (  ) non-pitting edema   (  ) ulceration     (  ) gangrene:     (location:     )    NERVOUS SYSTEM  (  ) A&Ox3     (  ) confused     (  ) lethargic  CN II-XII:     (  ) Intact     (  ) focal deficits  (Specify:     )   Upper extremities:     (  ) strength X/5     (  ) focal deficit (specify:    )  Lower extremities:     (  ) strength  X/5    (  ) focal deficit (specify:    )    SKIN  ( X ) No rashes or lesions     (  ) maculopapular rash     (  ) pustules     (  ) vesicles     (  ) ulcer     (  ) ecchymosis     (specify location:     )      LABS             8.5    8.64  )-----------( 216      ( 12-04-24 @ 08:01 )             26.4     140  |  111  |  12  -------------------------<  90   12-04-24 @ 08:01  3.6  |  18  |  0.8    Ca      10.4     12-04-24 @ 08:01  Mg     3.0     12-04-24 @ 08:01    TPro  5.5  /  Alb  3.1  /  TBili  0.8  /  DBili  x   /  AST  10  /  ALT  9   /  AlkPhos  93  /  GGT  x     12-04-24 @ 08:01        Urinalysis Basic - ( 04 Dec 2024 08:01 )    Color: x / Appearance: x / SG: x / pH: x  Gluc: 90 mg/dL / Ketone: x  / Bili: x / Urobili: x   Blood: x / Protein: x / Nitrite: x   Leuk Esterase: x / RBC: x / WBC x   Sq Epi: x / Non Sq Epi: x / Bacteria: x          IMAGING

## 2024-12-04 NOTE — PROGRESS NOTE ADULT - SUBJECTIVE AND OBJECTIVE BOX
Patient is a 74y old  Female who presents with a chief complaint of abd pain (01 Dec 2024 08:52)        Over Night Events:  On MV         ROS:     All ROS are negative except HPI         PHYSICAL EXAM    ICU Vital Signs Last 24 Hrs  T(C): 36.5 (04 Dec 2024 05:00), Max: 36.8 (03 Dec 2024 20:30)  T(F): 97.7 (04 Dec 2024 05:00), Max: 98.2 (03 Dec 2024 20:30)  HR: 65 (04 Dec 2024 05:00) (63 - 79)  BP: 117/57 (04 Dec 2024 05:00) (111/65 - 130/70)  BP(mean): --  ABP: --  ABP(mean): --  RR: 18 (04 Dec 2024 05:00) (18 - 18)  SpO2: 100% (04 Dec 2024 05:00) (98% - 100%)    O2 Parameters below as of 04 Dec 2024 05:00  Patient On (Oxygen Delivery Method): ventilator            CONSTITUTIONAL:  NAD    ENT:   Airway patent,   Mouth with normal mucosa.   Trach     EYES:   Pupils equal,   Round and reactive to light.    CARDIAC:   Normal rate,   Regular rhythm.        RESPIRATORY:   No wheezing  Bilateral BS  Normal chest expansion  Not tachypneic,  No use of accessory muscles    GASTROINTESTINAL:  Abdomen soft,   Non-tender,   No guarding,   + BS    MUSCULOSKELETAL:   No clubbing, cyanosis      SKIN:   Skin normal color for race,   Warm and dry  No evidence of rash.      12-03-24 @ 07:01  -  12-04-24 @ 05:39  --------------------------------------------------------  IN:    Enteral Tube Flush: 200 mL    Peptamen A.F.: 455 mL  Total IN: 655 mL    OUT:    Rectal Tube (mL): 400 mL  Total OUT: 400 mL    Total NET: 255 mL          LABS:                            8.3    9.22  )-----------( 208      ( 03 Dec 2024 07:38 )             25.5                                               12-03    141  |  111[H]  |  11  ----------------------------<  91  3.2[L]   |  21  |  0.6[L]    Ca    9.9      03 Dec 2024 07:38  Mg     1.4     12-03    TPro  5.3[L]  /  Alb  3.2[L]  /  TBili  0.9  /  DBili  x   /  AST  9   /  ALT  9   /  AlkPhos  91  12-03                                             Urinalysis Basic - ( 03 Dec 2024 07:38 )    Color: x / Appearance: x / SG: x / pH: x  Gluc: 91 mg/dL / Ketone: x  / Bili: x / Urobili: x   Blood: x / Protein: x / Nitrite: x   Leuk Esterase: x / RBC: x / WBC x   Sq Epi: x / Non Sq Epi: x / Bacteria: x                                                  LIVER FUNCTIONS - ( 03 Dec 2024 07:38 )  Alb: 3.2 g/dL / Pro: 5.3 g/dL / ALK PHOS: 91 U/L / ALT: 9 U/L / AST: 9 U/L / GGT: x                                                                                               Mode: AC/ CMV (Assist Control/ Continuous Mandatory Ventilation)  RR (machine): 14  TV (machine): 400  FiO2: 40  PEEP: 8  ITime: 0.8  MAP: 11  PIP: 19                                          MEDICATIONS  (STANDING):  amLODIPine   Tablet 10 milliGRAM(s) Oral daily  chlorhexidine 0.12% Liquid 15 milliLiter(s) Oral Mucosa every 12 hours  chlorhexidine 2% Cloths 1 Application(s) Topical <User Schedule>  dextrose 5%. 1000 milliLiter(s) (100 mL/Hr) IV Continuous <Continuous>  enoxaparin Injectable 90 milliGRAM(s) SubCutaneous every 12 hours  losartan 50 milliGRAM(s) Oral daily  nystatin Cream 1 Application(s) Topical two times a day  pantoprazole   Suspension 40 milliGRAM(s) Oral two times a day  vitamin A & D Ointment 1 Application(s) Topical two times a day    MEDICATIONS  (PRN):  acetaminophen     Tablet .. 650 milliGRAM(s) Oral every 6 hours PRN Temp greater or equal to 38C (100.4F)  albuterol    90 MICROgram(s) HFA Inhaler 2 Puff(s) Inhalation every 6 hours PRN Shortness of Breath and/or Wheezing  ipratropium 17 MICROgram(s) HFA Inhaler 2 Puff(s) Inhalation every 6 hours PRN SOb      New X-rays reviewed:                                                                                  ECHO

## 2024-12-04 NOTE — PROGRESS NOTE ADULT - ATTENDING COMMENTS
73-year-old female PMH A-fib on xarelto, HTN, s/p cholecystectomy, congenital solitary kidney presents from NH to the ED for evaluation of weakness, decreased PO intake and abdominal distension.     Admitted to ICU for septic shock requiring pressors 2/2 ESBL proteus (UTI vs. GI source) complicated by gastric distension w/ pneumatosis, NOLA 2/2 ATN, hypernatremia. s/p NGT decompression (-3.1 L feculent output) in ED. Intubated 11/6 for AHRF. EGD 11/8 w/ multiple ulcers, largest 10cm, path +gastritis. Now with new R colon/cecal mass pending colonoscopy when more stable.   Extubated 11/13 to AVAPs, weaned to HFNC however transitioned back to BiPAP after desaturation. Patient was weak and unable to clear secretions despite non-invasive measures. Reintubated 11/18 PM, s/p trach/PEJ 11/19 with thoracic sx.    # Norovirus Diarrhea- GI PCR on 11/23 and 1/24- positive x 2 , GI PCR ,  on contact isolation , supportive tx.  - stool cdiff on 11/23 neg  12/4: Diarrhea persists will start Imodium x 1 dose trial       #AHRF 2/2 septic shock due to ESBL proteus (UTI Vs. GI source) s/p intubation x2, s/p trach 11/19  #HAGMA 2/2 lactic acidosis and uremia - resolved   #NOLA likely ATN 2/2 septic shock - resolved   #Possible aspiration  #Congenital solitary kidney  - [facility]/18, s/p trach 11/19  - UCx +ESBL proteus, BCX (-), trach culture with resp rosalva, sputum cx 11/12 resp rosalva  - off pressors, off sedation  - s/p abx (finished 11/13)  - c/w mechanical ventilation, SBT as tolerated   - pain management,  chest PT/MDI q6h  - post bronch on 11/20- neg , f/u bronch cx/gram stain (11/18-neg , 11/19- neg )      #Dysphagia 2/2 critical illness s/p PEJ  - PEJ w/ thoracic 11/19- tolerating feedings well  J port clogged (11/29). Try Clog Zapper. Or call Thoracic Surgery.    #R colon/cecal mass on CT A/P  #Multiple gastric ulcers iso chronic gastritis   #Abdominal distension w/ pneumatosis - resolved   #Diarrhea - recurrent on 11/23- obtained GI PCR  - CT A/P on admission with severe gastric distension with new pneumatosis along posterior gastric wall suspicious for ischemia, foci of free intraperitoneal air ; suspicious portal venous gas; thick bladder wall (cystitis vs colovesicular fistula)   - s/p EGD 11/8 with multiple ulcers, erosive gastritis:  EGD path: chronic gastritis (-) H pylori, no malignancy   - repeated CT A/P with PO contrast 11.8 w/ suspicious hepatic flexure narrowing and lobulated soft tissue density in R colon/cecum.   -  Cdiff (-)  -  PPI BID   - colonoscopy when more stable as per GI    #Hypernatremia- corrected . free water via peg, daily BMP  # Hypomagnesemia - supplemented     #Sacral DTI - offloading, wound care, pressure injury prevention    #Afib-  on therapeutic lovenox tx.    #HTN - amlodipine 10mg qd PO, losartan 50mg qd PO (home meds)    #TRacheostomy bleeding: noted on 11/30, s/p surgicel by surgery. Stopped as of 12/1. Increased lovenox back from prophylactic to therapeutic dose.      DVT ppx:  on lovenox tx.    Diet: NPO w/ PEJ feeding  GI ppx/Bowel regimen: PPI BID    Code status: full code    Family discussion: current patient's condition and medical plan of care d/w family by bedside    #Progress Note Handoff: monitor for resolution of diarrhea,  electrolytes supplement PRN,  vent management as per Pulm. team    Disposition: SNF after diarrhea improves.     Total time spent to complete patient's bedside assessment, review medical chart, discuss medical plan of care with covering medical team was more than 35 minutes with >50% of time spent face to face with patient, discussion with patient/family and/or coordination of care

## 2024-12-04 NOTE — PROGRESS NOTE ADULT - ASSESSMENT
73-year-old female PMH A-fib on xarelto,, HTN, s/p cholecystectomy, congenital solitary kidney presents from NH to the ED for evaluation of weakness, decreased PO intake and abdominal distension.   Admitted to ICU for septic shock 2/2 ESBL proteus (UTI vs. GI source) complicated by gastric distension w/ pneumatosis, NOLA 2/2 ATN, hypernatremia. s/p NGT decompression (-3.1 L feculent output) in ED. Intubated 11/6 for AHRF. EGD 11/8 w/ multiple ulcers, largest 10cm), path +gastritis. Now with new R colon/cecal mass pending colonoscopy when more stable.   Extubated 11/13 to AVAPs, weaned to HFNC however transitioned back to BiPAP after desaturation. Patient was weak and unable to clear secretions despite non-invasive measures. Reintubated 11/18 PM, s/p trach/PEJ 11.19 with thoracic.     #AHRF 2/2 septic shock due to ESBL proteus UTI s/p intubation x2, s/p trach 11/19  #HAGMA 2/2 lactic acidosis and uremia - resolved   #NOLA likely ATN 2/2 septic shock - resolved   #Possible aspiration  #Congenital solitary kidney  - Intubated 11/6, extubated 11/13 to AVAPS, re-intubated 11/18, s/p trach placement by CT surg 11/19  - UCx +ESBL proteus, BCX (-), trach culture with resp rosalva, sputum cx 11/12 resp rosalva  - s/p abx (freddie, diflucan, flagyl, vanc)  - c/w mechanical ventilation  - daily SBT, chest PT  - bronch 11/18 and 11/19 for mucus plugging       - few Gram pos cocci in clusters- commensal rosalva       - Per ID, monitor off abx, if clinically worsens can start linezolid  - Switched ipratropium inhaler to nebs  - CXR 11/22- improved      # trach bleed (on 11/30) - resolved   - Pt had an episode of  trach bleeding during the night  - Sx Recalled: Packed trach incision with surgicel and covered with gauze.   - Per Sx: lovenox held and now back to therapeutic dose.      # Norovirus Diarrhea  - GI PCR on 11/23 and 1/24- positive x 2   - stool cdiff on 11/23 neg  - supportive treatment   - Per Nutrition Support can change J feeds to Peptamen       #Dysphagia 2/2 critical illness s/p PEJ  - S&S eval - failed  - NGT placement - failed multiple times by ICU staff and ENT,  - s/p PEJ placement by CT surg 11/19  - C/w feeds   - Per Nutrition Support can change J feeds to Peptamen     #Abdominal distension w/ pneumatosis   #R colon/cecal mass on CT A/P  #Diarrhea- new on 11/23  - NGT placed for decompression in ED with 3.1L feculent output   - GI recs, surgery recs, vascular recs appreciated   - s/p EGD 11/8 with multiple ulcers, erosive gastritis  - EGD path: chronic gastritis, (-) Hpylori, no malignancy   - CT A/P with PO contrast w/ suspicious hepatic flexure narrowing and lobulated soft tissue density in R colon/cecum.   - TSH wnl  - c/w IV PPI BID   - colonoscopy when more stable as per GI      #Sacral DTI  - care per wound care team    #Afib  - holding xarelto   - c/w therapeutic lovenox  - TTE 11/05- EF 71%, hyperdynamic, LA enlargement, fibrocalcific aortic valve w/ moderate AS, trivial pericardial effusion    #HTN  - C/w amlodipine 10mg qd PO and losartan 50mg qd PO home meds     #MISC  - DVT ppx: lovenox  - Diet: NPO w/ TF   - GI ppx: Protonix BID  - Activity: as tolerated

## 2024-12-04 NOTE — PROGRESS NOTE ADULT - ASSESSMENT
IMPRESSION:    Acute hypoxemic respiratory failure SP trach and PEG   Left lung atelectasis SP multiple bronchoscopies   Septic shock - resolved  UTI  Suspected colon mass  Severe gastric distension w/ pneumatosis along posterior gastric wall resolved  Possible aspiration / LLL consolidation  Thrombocytopenia improved   hypernatremia  Gastric ulcer   NOLA resolved  Afib on Xarelto  HO HTN  HO ESBL Proteus    PLAN:    CNS:  Pain control as needed .     HEENT: Oral care. Trach care.  CTS follow up noted     PULMONARY: Vent changes;  Wean o2 as tolerated.  40%,  PEEP 8.  Keep O2 sat 92-96%.  Pulmonary toilet.  CXR noted.  PSV 2-4 hours as tolerated      CARDIOVASCULAR: Avoid overload.     GI: GI prophylaxis.  Tube feeding.  Bowel regimen.      RENAL:  Follow up lytes. Correct as needed.      INFECTIOUS DISEASE: SP Meropenem course.  Monitor VS     HEMATOLOGICAL: Therapeutic AC.  Monitor CBC.      ENDOCRINE:  Follow up FS. Insulin protocol if needed.    MUSCULOSKELETAL: Bed chair position.  Off loading    Full code.   Vent unit  DC planing

## 2024-12-05 LAB
A1C WITH ESTIMATED AVERAGE GLUCOSE RESULT: 4.6 % — SIGNIFICANT CHANGE UP (ref 4–5.6)
ALBUMIN SERPL ELPH-MCNC: 2.9 G/DL — LOW (ref 3.5–5.2)
ALP SERPL-CCNC: 85 U/L — SIGNIFICANT CHANGE UP (ref 30–115)
ALT FLD-CCNC: 8 U/L — SIGNIFICANT CHANGE UP (ref 0–41)
ANION GAP SERPL CALC-SCNC: 13 MMOL/L — SIGNIFICANT CHANGE UP (ref 7–14)
AST SERPL-CCNC: 10 U/L — SIGNIFICANT CHANGE UP (ref 0–41)
BASOPHILS # BLD AUTO: 0.05 K/UL — SIGNIFICANT CHANGE UP (ref 0–0.2)
BASOPHILS NFR BLD AUTO: 0.6 % — SIGNIFICANT CHANGE UP (ref 0–1)
BILIRUB SERPL-MCNC: 0.7 MG/DL — SIGNIFICANT CHANGE UP (ref 0.2–1.2)
BUN SERPL-MCNC: 17 MG/DL — SIGNIFICANT CHANGE UP (ref 10–20)
CALCIUM SERPL-MCNC: 10.2 MG/DL — SIGNIFICANT CHANGE UP (ref 8.4–10.5)
CHLORIDE SERPL-SCNC: 110 MMOL/L — SIGNIFICANT CHANGE UP (ref 98–110)
CO2 SERPL-SCNC: 17 MMOL/L — SIGNIFICANT CHANGE UP (ref 17–32)
CREAT SERPL-MCNC: 1 MG/DL — SIGNIFICANT CHANGE UP (ref 0.7–1.5)
EGFR: 60 ML/MIN/1.73M2 — SIGNIFICANT CHANGE UP
EOSINOPHIL # BLD AUTO: 0.22 K/UL — SIGNIFICANT CHANGE UP (ref 0–0.7)
EOSINOPHIL NFR BLD AUTO: 2.6 % — SIGNIFICANT CHANGE UP (ref 0–8)
ESTIMATED AVERAGE GLUCOSE: 85 MG/DL — SIGNIFICANT CHANGE UP (ref 68–114)
GLUCOSE BLDC GLUCOMTR-MCNC: 106 MG/DL — HIGH (ref 70–99)
GLUCOSE BLDC GLUCOMTR-MCNC: 110 MG/DL — HIGH (ref 70–99)
GLUCOSE BLDC GLUCOMTR-MCNC: 130 MG/DL — HIGH (ref 70–99)
GLUCOSE BLDC GLUCOMTR-MCNC: 132 MG/DL — HIGH (ref 70–99)
GLUCOSE SERPL-MCNC: 93 MG/DL — SIGNIFICANT CHANGE UP (ref 70–99)
HCT VFR BLD CALC: 25.3 % — LOW (ref 37–47)
HGB BLD-MCNC: 8.2 G/DL — LOW (ref 12–16)
IMM GRANULOCYTES NFR BLD AUTO: 0.6 % — HIGH (ref 0.1–0.3)
LYMPHOCYTES # BLD AUTO: 1.62 K/UL — SIGNIFICANT CHANGE UP (ref 1.2–3.4)
LYMPHOCYTES # BLD AUTO: 19.3 % — LOW (ref 20.5–51.1)
MAGNESIUM SERPL-MCNC: 2.2 MG/DL — SIGNIFICANT CHANGE UP (ref 1.8–2.4)
MCHC RBC-ENTMCNC: 30.3 PG — SIGNIFICANT CHANGE UP (ref 27–31)
MCHC RBC-ENTMCNC: 32.4 G/DL — SIGNIFICANT CHANGE UP (ref 32–37)
MCV RBC AUTO: 93.4 FL — SIGNIFICANT CHANGE UP (ref 81–99)
MONOCYTES # BLD AUTO: 0.6 K/UL — SIGNIFICANT CHANGE UP (ref 0.1–0.6)
MONOCYTES NFR BLD AUTO: 7.1 % — SIGNIFICANT CHANGE UP (ref 1.7–9.3)
NEUTROPHILS # BLD AUTO: 5.86 K/UL — SIGNIFICANT CHANGE UP (ref 1.4–6.5)
NEUTROPHILS NFR BLD AUTO: 69.8 % — SIGNIFICANT CHANGE UP (ref 42.2–75.2)
NRBC # BLD: 0 /100 WBCS — SIGNIFICANT CHANGE UP (ref 0–0)
PLATELET # BLD AUTO: 206 K/UL — SIGNIFICANT CHANGE UP (ref 130–400)
PMV BLD: 11.5 FL — HIGH (ref 7.4–10.4)
POTASSIUM SERPL-MCNC: 3.8 MMOL/L — SIGNIFICANT CHANGE UP (ref 3.5–5)
POTASSIUM SERPL-SCNC: 3.8 MMOL/L — SIGNIFICANT CHANGE UP (ref 3.5–5)
PROT SERPL-MCNC: 5.3 G/DL — LOW (ref 6–8)
RBC # BLD: 2.71 M/UL — LOW (ref 4.2–5.4)
RBC # FLD: 14.6 % — HIGH (ref 11.5–14.5)
SODIUM SERPL-SCNC: 140 MMOL/L — SIGNIFICANT CHANGE UP (ref 135–146)
WBC # BLD: 8.4 K/UL — SIGNIFICANT CHANGE UP (ref 4.8–10.8)
WBC # FLD AUTO: 8.4 K/UL — SIGNIFICANT CHANGE UP (ref 4.8–10.8)

## 2024-12-05 PROCEDURE — 99232 SBSQ HOSP IP/OBS MODERATE 35: CPT

## 2024-12-05 RX ADMIN — ENOXAPARIN SODIUM 90 MILLIGRAM(S): 30 INJECTION SUBCUTANEOUS at 06:47

## 2024-12-05 RX ADMIN — NYSTATIN 1 APPLICATION(S): 100000 POWDER TOPICAL at 17:12

## 2024-12-05 RX ADMIN — Medication 1 APPLICATION(S): at 06:48

## 2024-12-05 RX ADMIN — CHLORHEXIDINE GLUCONATE 1 APPLICATION(S): 1.2 RINSE ORAL at 06:48

## 2024-12-05 RX ADMIN — CHLORHEXIDINE GLUCONATE 15 MILLILITER(S): 1.2 RINSE ORAL at 17:13

## 2024-12-05 RX ADMIN — LOSARTAN POTASSIUM 50 MILLIGRAM(S): 100 TABLET, FILM COATED ORAL at 06:48

## 2024-12-05 RX ADMIN — ENOXAPARIN SODIUM 90 MILLIGRAM(S): 30 INJECTION SUBCUTANEOUS at 17:12

## 2024-12-05 RX ADMIN — NYSTATIN 1 APPLICATION(S): 100000 POWDER TOPICAL at 06:48

## 2024-12-05 RX ADMIN — AMLODIPINE BESYLATE 10 MILLIGRAM(S): 10 TABLET ORAL at 06:47

## 2024-12-05 RX ADMIN — PANTOPRAZOLE SODIUM 40 MILLIGRAM(S): 40 TABLET, DELAYED RELEASE ORAL at 17:13

## 2024-12-05 RX ADMIN — Medication 1 APPLICATION(S): at 17:13

## 2024-12-05 RX ADMIN — PANTOPRAZOLE SODIUM 40 MILLIGRAM(S): 40 TABLET, DELAYED RELEASE ORAL at 06:48

## 2024-12-05 RX ADMIN — Medication 2 MILLIGRAM(S): at 11:51

## 2024-12-05 RX ADMIN — CHLORHEXIDINE GLUCONATE 15 MILLILITER(S): 1.2 RINSE ORAL at 06:48

## 2024-12-05 NOTE — PROGRESS NOTE ADULT - ASSESSMENT
73-year-old female PMH A-fib on xarelto,, HTN, s/p cholecystectomy, congenital solitary kidney presents from NH to the ED for evaluation of weakness, decreased PO intake and abdominal distension.   Admitted to ICU for septic shock 2/2 ESBL proteus (UTI vs. GI source) complicated by gastric distension w/ pneumatosis, NOLA 2/2 ATN, hypernatremia. s/p NGT decompression (-3.1 L feculent output) in ED. Intubated 11/6 for AHRF. EGD 11/8 w/ multiple ulcers, largest 10cm), path +gastritis. Now with new R colon/cecal mass pending colonoscopy when more stable.   Extubated 11/13 to AVAPs, weaned to HFNC however transitioned back to BiPAP after desaturation. Patient was weak and unable to clear secretions despite non-invasive measures. Reintubated 11/18 PM, s/p trach/PEJ 11.19 with thoracic.     #AHRF 2/2 septic shock due to ESBL proteus UTI s/p intubation x2, s/p trach 11/19  #HAGMA 2/2 lactic acidosis and uremia - resolved   #NOLA likely ATN 2/2 septic shock - resolved   #Possible aspiration  #Congenital solitary kidney  - Intubated 11/6, extubated 11/13 to AVAPS, re-intubated 11/18, s/p trach placement by CT surg 11/19  - UCx +ESBL proteus, BCX (-), trach culture with resp rosalva, sputum cx 11/12 resp rosalva  - s/p abx (freddie, diflucan, flagyl, vanc)  - c/w mechanical ventilation  - daily SBT, chest PT  - bronch 11/18 and 11/19 for mucus plugging       - few Gram pos cocci in clusters- commensal rosalva       - Per ID, monitor off abx, if clinically worsens can start linezolid  - Switched ipratropium inhaler to nebs  - CXR 11/22- improved      # trach bleed (on 11/30) - resolved   - Pt had an episode of  trach bleeding during the night  - Sx Recalled: Packed trach incision with surgicel and covered with gauze.   - Per Sx: lovenox held and now back to therapeutic dose.      # Norovirus Diarrhea  - GI PCR on 11/23 and 1/24- positive x 2   - stool cdiff on 11/23 neg  - supportive treatment   - Per Nutrition Support can change J feeds to Peptamen   - imodium daily    #Dysphagia 2/2 critical illness s/p PEJ  - S&S eval - failed  - NGT placement - failed multiple times by ICU staff and ENT,  - s/p PEJ placement by CT surg 11/19  - C/w feeds   - Per Nutrition Support can change J feeds to Peptamen     #Abdominal distension w/ pneumatosis   #R colon/cecal mass on CT A/P  #Diarrhea- new on 11/23  - NGT placed for decompression in ED with 3.1L feculent output   - GI recs, surgery recs, vascular recs appreciated   - s/p EGD 11/8 with multiple ulcers, erosive gastritis  - EGD path: chronic gastritis, (-) Hpylori, no malignancy   - CT A/P with PO contrast w/ suspicious hepatic flexure narrowing and lobulated soft tissue density in R colon/cecum.   - TSH wnl  - c/w IV PPI BID   - colonoscopy when more stable as per GI      #Sacral DTI  - care per wound care team    #Afib  - holding xarelto   - c/w therapeutic lovenox  - TTE 11/05- EF 71%, hyperdynamic, LA enlargement, fibrocalcific aortic valve w/ moderate AS, trivial pericardial effusion    #HTN  - C/w amlodipine 10mg qd PO and losartan 50mg qd PO home meds     #MISC  - DVT ppx: lovenox  - Diet: NPO w/ TF   - GI ppx: Protonix BID  - Activity: as tolerated

## 2024-12-05 NOTE — CHART NOTE - NSCHARTNOTEFT_GEN_A_CORE
Registered Dietitian Follow-Up     Patient Profile Reviewed                           Yes [x]   No []     Nutrition History Previously Obtained        Yes [x]  No []       Pertinent Subjective Information: Per Plan of care, no discomfort regarding G-J tube. Per I7O's pt is receiving feeds as scheduled.      Pertinent Medical Interventions:  # Norovirus Diarrhea- GI PCR on  and - positive x 2 , GI PCR ,  on contact isolation , supportive tx.  - stool cdiff on  neg  : Diarrhea persists given  Imodium x 1 dose   - decreased diarrhea, given imodium x 1 dose, monitor for BM's   #AHRF 2/2 septic shock due to ESBL proteus (UTI Vs. GI source) s/p intubation x2, s/p trach   #HAGMA 2/2 lactic acidosis and uremia - resolved   #NOLA likely ATN 2/2 septic shock - resolved   #Possible aspiration  #Dysphagia 2/2 critical illness s/p PEJ  - PEJ w/ thoracic - tolerating feedings well  #Hypernatremia- corrected . free water via peg, close  BMP  # Hypomagnesemia -corrected   #R colon/cecal mass on CT A/P  #Multiple gastric ulcers iso chronic gastritis   #Abdominal distension w/ pneumatosis - resolved      Diet order: Diet, NPO with Tube Feed:   Tube Feeding Modality: Jejunostomy  Peptamen 1.5  Total Volume for 24 Hours (mL): 1120  Intermittent  Until Goal Tube Feed Rate (mL per Hour): 70  Tube Feeding Hours ON: 16  Tube Feeding OFF (Hours): 8  Tube Feed Start Time: 14:00  Free Water Flush Instructions:  set pump to flush J port with 30 ml water every 3 hours during feeding cycle  Banatrol TF     Qty per Day:  3 (24 @ 14:56) [Active]    Anthropometrics:  Height (cm): 167.6 (24 @ 20:17)  Weight (kg): 90.2 (24 @ 20:17)  BMI (kg/m2): 32.1 (24 @ 20:17)  IBW: 59kg     Daily Weight in k.1 (), Weight in k.4 ()    MEDICATIONS  (STANDING):  amLODIPine   Tablet 10 milliGRAM(s) Oral daily  dextrose 5%. 1000 milliLiter(s) (100 mL/Hr) IV Continuous <Continuous>  enoxaparin Injectable 90 milliGRAM(s) SubCutaneous every 12 hours  losartan 50 milliGRAM(s) Oral daily  nystatin Cream 1 Application(s) Topical two times a day  pantoprazole   Suspension 40 milliGRAM(s) Oral two times a day  potassium chloride   Powder 20 milliEquivalent(s) Oral once    MEDICATIONS  (PRN):  albuterol    90 MICROgram(s) HFA Inhaler 2 Puff(s) Inhalation every 6 hours PRN Shortness of Breath and/or Wheezing  ipratropium 17 MICROgram(s) HFA Inhaler 2 Puff(s) Inhalation every 6 hours PRN SOb    Pertinent Labs:  @ 07:54: Na 140, BUN 17, Cr 1.0, BG 93, K+ 3.8, Phos --, Mg 2.2, Alk Phos 85, ALT/SGPT 8, AST/SGOT 10, HbA1c --    Finger Sticks:  POCT Blood Glucose.: 106 mg/dL ( @ 11:35)  POCT Blood Glucose.: 130 mg/dL ( @ 06:15)  POCT Blood Glucose.: 120 mg/dL ( @ 23:54)    Physical Findings:  - Appearance: trached/unable to speak   - GI function: diarrhea, last BM   - Tubes: G-J   - Oral/Mouth cavity: NPO w/ TF   - Skin: dryness/ecchymosis   - Edema: no edema      Nutrition Requirements:  Weight Used: 91.6 KG Using ABW -- adjusted as patient no longer intubated with consideration of age, BMI, acuity of illness     Estimated Energy Needs    Continue [X]  Adjust []  ENERGY: 4885-9627 kcal/day (20-25 kcal/kg)     Estimated Protein Needs    Continue [x]  Adjust []  PROTEIN: 71 - 99 g/day (1.2-1.4 g/kg IBW 59 KG)     Estimated Fluid Needs        Continue [x]  Adjust []  FLUID: 1 mL/kcal/day      Nutrient Intake: current diet order provides: 1800kcal, 76g PRO, 860.16mL free H2O     [X] Previous Nutrition Diagnosis: Inadequate Protein Energy Intake             [] Ongoing          [X] Resolved     Nutrition Education: deferred     Goal/Expected Outcome: Pt to meet >% of nutrient needs via EN in 5-7 days.       Nutrition Monitoring:diet order,energy intake,body composition,NFPF, lytes, GI (BM), BG     Recommendation:  1. cont w/ current tube feed order  2. Monitor Gi S/S   3. Maintain all aspiration precautions   4. Maintain HOB >45           mod risk f/u   RD remains available: Toshia Pichardo, RDN [phone] Registered Dietitian Follow-Up     Patient Profile Reviewed                           Yes [x]   No []     Nutrition History Previously Obtained        Yes [x]  No []       Pertinent Subjective Information: Per Plan of care, no discomfort regarding G-J tube. Per I&O's pt is receiving feeds as scheduled.      Pertinent Medical Interventions:  # Norovirus Diarrhea- GI PCR on  and - positive x 2 , GI PCR ,  on contact isolation , supportive tx.  - stool cdiff on  neg  : Diarrhea persists given  Imodium x 1 dose   - decreased diarrhea, given imodium x 1 dose, monitor for BM's   #AHRF 2/2 septic shock due to ESBL proteus (UTI Vs. GI source) s/p intubation x2, s/p trach   #HAGMA 2/2 lactic acidosis and uremia - resolved   #NOLA likely ATN 2/2 septic shock - resolved   #Possible aspiration  #Dysphagia 2/2 critical illness s/p PEJ  - PEJ w/ thoracic - tolerating feedings well  #Hypernatremia- corrected . free water via peg, close  BMP  # Hypomagnesemia -corrected   #R colon/cecal mass on CT A/P  #Multiple gastric ulcers iso chronic gastritis   #Abdominal distension w/ pneumatosis - resolved      Diet order: Diet, NPO with Tube Feed:   Tube Feeding Modality: Jejunostomy  Peptamen 1.5  Total Volume for 24 Hours (mL): 1120  Intermittent  Until Goal Tube Feed Rate (mL per Hour): 70  Tube Feeding Hours ON: 16  Tube Feeding OFF (Hours): 8  Tube Feed Start Time: 14:00  Free Water Flush Instructions:  set pump to flush J port with 30 ml water every 3 hours during feeding cycle  Banatrol TF     Qty per Day:  3 (24 @ 14:56) [Active]    Anthropometrics:  Height (cm): 167.6 (24 @ 20:17)  Weight (kg): 90.2 (24 @ 20:17)  BMI (kg/m2): 32.1 (24 @ 20:17)  IBW: 59kg     Daily Weight in k.1 (), Weight in k.4 ()    MEDICATIONS  (STANDING):  amLODIPine   Tablet 10 milliGRAM(s) Oral daily  dextrose 5%. 1000 milliLiter(s) (100 mL/Hr) IV Continuous <Continuous>  enoxaparin Injectable 90 milliGRAM(s) SubCutaneous every 12 hours  losartan 50 milliGRAM(s) Oral daily  nystatin Cream 1 Application(s) Topical two times a day  pantoprazole   Suspension 40 milliGRAM(s) Oral two times a day  potassium chloride   Powder 20 milliEquivalent(s) Oral once    MEDICATIONS  (PRN):  albuterol    90 MICROgram(s) HFA Inhaler 2 Puff(s) Inhalation every 6 hours PRN Shortness of Breath and/or Wheezing  ipratropium 17 MICROgram(s) HFA Inhaler 2 Puff(s) Inhalation every 6 hours PRN SOb    Pertinent Labs:  @ 07:54: Na 140, BUN 17, Cr 1.0, BG 93, K+ 3.8, Phos --, Mg 2.2, Alk Phos 85, ALT/SGPT 8, AST/SGOT 10, HbA1c --    Finger Sticks:  POCT Blood Glucose.: 106 mg/dL ( @ 11:35)  POCT Blood Glucose.: 130 mg/dL ( @ 06:15)  POCT Blood Glucose.: 120 mg/dL ( @ 23:54)    Physical Findings:  - Appearance: trached/unable to speak   - GI function: diarrhea, last BM   - Tubes: G-J   - Oral/Mouth cavity: NPO w/ TF   - Skin: dryness/ecchymosis   - Edema: no edema      Nutrition Requirements:  Weight Used: 91.6 KG Using ABW -- adjusted as patient no longer intubated with consideration of age, BMI, acuity of illness     Estimated Energy Needs    Continue [X]  Adjust []  ENERGY: 6805-0061 kcal/day (20-25 kcal/kg)     Estimated Protein Needs    Continue [x]  Adjust []  PROTEIN: 71 - 99 g/day (1.2-1.4 g/kg IBW 59 KG)     Estimated Fluid Needs        Continue [x]  Adjust []  FLUID: 1 mL/kcal/day      Nutrient Intake: current diet order provides: 1800kcal, 76g PRO, 860.16mL free H2O     [X] Previous Nutrition Diagnosis: Inadequate Protein Energy Intake             [] Ongoing          [X] Resolved     Nutrition Education: deferred     Goal/Expected Outcome: Pt to meet >% of nutrient needs via EN in 5-7 days.       Nutrition Monitoring:diet order,energy intake,body composition,NFPF, lytes, GI (BM), BG     Recommendation:  1. cont w/ current tube feed order  2. Monitor Gi S/S   3. Maintain all aspiration precautions   4. Maintain HOB >45           mod risk f/u   RD remains available: Toshia Pichardo, RDN [phone]

## 2024-12-05 NOTE — PROGRESS NOTE ADULT - SUBJECTIVE AND OBJECTIVE BOX
PATRICIA SPRAGUE  [facility]-N 3A 024 A (SI-N 3A)      Patient was evaluated and examined  by bedside, decreased diarrhea today       REVIEW OF SYSTEMS: please see pertinent positives mentioned above, all other 12 ROS negative      T(C): , Max: 36.9 (12-05-24 @ 05:00)  HR: 80 (12-05-24 @ 12:19)  BP: 99/54 (12-05-24 @ 12:19)  RR: 18 (12-05-24 @ 12:19)  SpO2: 100% (12-05-24 @ 12:19)  CAPILLARY BLOOD GLUCOSE      POCT Blood Glucose.: 106 mg/dL (05 Dec 2024 11:35)  POCT Blood Glucose.: 130 mg/dL (05 Dec 2024 06:15)  POCT Blood Glucose.: 120 mg/dL (04 Dec 2024 23:54)      PHYSICAL EXAM:  General: NAD, AAOX3, patient is laying comfortably in bed  HEENT: AT, NC, Supple, trach present  Lungs: CTA B/L, no wheezing, no rhonchi  CVS: normal S1, S2, RRR, NO M/G/R  Abdomen: soft, bowel sounds present, non-tender, non-distended ,peg present   Extremities: no edema, no clubbing, no cyanosis, positive peripheral pulses b/l  Neuro: generalized body weakness  Skin: no rash, no ecchymosis      LAB  CBC  Date: 12-05-24 @ 07:54  Mean cell Lakjhrqkyh69.3  Mean cell Hemoglobin Conc32.4  Mean cell Volum 93.4  Platelet count-Automate 206  RBC Count 2.71  Red Cell Distrib Width14.6  WBC Count8.40  % Albumin, Urine--  Hematocrit 25.3  Hemoglobin 8.2  CBC  Date: 12-04-24 @ 08:01  Mean cell Irefcserzf56.1  Mean cell Hemoglobin Conc32.2  Mean cell Volum 93.6  Platelet count-Automate 216  RBC Count 2.82  Red Cell Distrib Width14.4  WBC Count8.64  % Albumin, Urine--  Hematocrit 26.4  Hemoglobin 8.5  CBC  Date: 12-03-24 @ 07:38  Mean cell Jcgvtllrkx66.3  Mean cell Hemoglobin Conc32.5  Mean cell Volum 93.1  Platelet count-Automate 208  RBC Count 2.74  Red Cell Distrib Width14.0  WBC Count9.22  % Albumin, Urine--  Hematocrit 25.5  Hemoglobin 8.3  CBC  Date: 12-02-24 @ 07:02  Mean cell Nnajwtlmwg61.0  Mean cell Hemoglobin Conc31.2  Mean cell Volum 96.3  Platelet count-Automate 197  RBC Count 2.73  Red Cell Distrib Width14.2  WBC Count8.90  % Albumin, Urine--  Hematocrit 26.3  Hemoglobin 8.2  CBC  Date: 12-01-24 @ 07:37  Mean cell Xhpiuioyxd04.0  Mean cell Hemoglobin Conc31.0  Mean cell Volum 96.8  Platelet count-Automate 207  RBC Count 2.77  Red Cell Distrib Width14.2  WBC Count9.61  % Albumin, Urine--  Hematocrit 26.8  Hemoglobin 8.3  CBC  Date: 11-30-24 @ 07:59  Mean cell Jlmmfhdhuw88.8  Mean cell Hemoglobin Conc31.5  Mean cell Volum 94.5  Platelet count-Automate 219  RBC Count 2.72  Red Cell Distrib Width13.9  WBC Count8.62  % Albumin, Urine--  Hematocrit 25.7  Hemoglobin 8.1  CBC  Date: 11-30-24 @ 02:27  Mean cell Iutwumghar70.0  Mean cell Hemoglobin Conc31.9  Mean cell Volum 94.0  Platelet count-Automate 92  RBC Count 2.67  Red Cell Distrib Width13.7  WBC Count9.47  % Albumin, Urine--  Hematocrit 25.1  Hemoglobin 8.0  CBC  Date: 11-29-24 @ 08:16  Mean cell Qamnuehjdm35.9  Mean cell Hemoglobin Conc31.6  Mean cell Volum 94.7  Platelet count-Automate 162  RBC Count 2.64  Red Cell Distrib Width13.9  WBC Count7.68  % Albumin, Urine--  Hematocrit 25.0  Hemoglobin 7.9    [facility]  12-05-24 @ 07:54  Blood Gas Arterial-Calcium,Ionized--  Blood Urea Nitrogen, Serum 17 mg/dL [10 - 20]  Carbon Dioxide, Serum17 mmol/L [17 - 32]  Chloride, [id number] mmol/L [98 - 110]  Creatinie, Serum1.0 mg/dL [0.7 - 1.5]  Glucose, Serum93 mg/dL [70 - 99]  Potassium, Serum3.8 mmol/L [3.5 - 5.0]  Sodium, Serum 140 mmol/L [135 - 146]  [facility]  12-04-24 @ 08:01  Blood Gas Arterial-Calcium,Ionized--  Blood Urea Nitrogen, Serum 12 mg/dL [10 - 20]  Carbon Dioxide, Serum18 mmol/L [17 - 32]  Chloride, [id number] mmol/L[H] [98 - 110]  Creatinie, Serum0.8 mg/dL [0.7 - 1.5]  Glucose, Serum90 mg/dL [70 - 99]  Potassium, Serum3.6 mmol/L [3.5 - 5.0]  Sodium, Serum 140 mmol/L [135 - 146]  [facility]  12-03-24 @ 07:38  Blood Gas Arterial-Calcium,Ionized--  Blood Urea Nitrogen, Serum 11 mg/dL [10 - 20]  Carbon Dioxide, Serum21 mmol/L [17 - 32]  Chloride, [id number] mmol/L[H] [98 - 110]  Creatinie, Serum0.6 mg/dL[L] [0.7 - 1.5]  Glucose, Serum91 mg/dL [70 - 99]  Potassium, Serum3.2 mmol/L[L] [3.5 - 5.0]  Sodium, Serum 141 mmol/L [135 - 146]  [facility]  12-02-24 @ 07:02  Blood Gas Arterial-Calcium,Ionized--  Blood Urea Nitrogen, Serum 13 mg/dL [10 - 20]  Carbon Dioxide, Serum23 mmol/L [17 - 32]  Chloride, Ipusb120 mmol/L[H] [98 - 110]  Creatinie, Serum0.7 mg/dL [0.7 - 1.5]  Glucose, Serum81 mg/dL [70 - 99]  Potassium, Serum3.4 mmol/L[L] [3.5 - 5.0]  Sodium, Serum 145 mmol/L [135 - 146]  [facility]  12-01-24 @ 07:37  Blood Gas Arterial-Calcium,Ionized--  Blood Urea Nitrogen, Serum 16 mg/dL [10 - 20]  Carbon Dioxide, Serum23 mmol/L [17 - 32]  Chloride, [id number] mmol/L[H] [98 - 110]  Creatinie, Serum0.7 mg/dL [0.7 - 1.5]  Glucose, Serum86 mg/dL [70 - 99]  Potassium, Serum3.6 mmol/L [3.5 - 5.0]  Sodium, Serum 146 mmol/L [135 - 146]              Microbiology:    Culture - Stool (collected 11-23-24 @ 14:11)  Source: .Stool  Final Report (11-25-24 @ 22:02):    No enteric pathogens isolated.    (Stool culture examined for Salmonella,    Shigella, Campylobacter, Aeromonas, Plesiomonas,    Vibrio, E.coli O157 and Yersinia)    Culture - Fungal, Bronchial (collected 11-20-24 @ 12:00)  Source: Bronchial  Preliminary Report (12-04-24 @ 23:01):    No fungus isolated at 2 weeks.    Culture - Acid Fast - Bronchial w/Smear (collected 11-20-24 @ 12:00)  Source: Bronchial  Preliminary Report (12-04-24 @ 23:05):    No acid-fast bacilli isolated at 2 weeks.    Culture - Bronchial (collected 11-20-24 @ 12:00)  Source: Bronchial  Gram Stain (11-20-24 @ 23:18):    Moderate polymorphonuclear leukocytes per low power field    No squamous epithelial cells    Few Gram Positive Cocci in Clusters per oil power field  Final Report (11-22-24 @ 09:09):    Commensal rosalva consistent with body site    Culture - Fungal, Bronchial (collected 11-19-24 @ 11:25)  Source: Bronchial  Preliminary Report (12-04-24 @ 23:01):    No fungus isolated at 2 weeks.    Culture - Acid Fast - Bronchial w/Smear (collected 11-19-24 @ 11:25)  Source: Bronchial  Preliminary Report (12-04-24 @ 23:05):    No acid-fast bacilli isolated at 2 weeks.    Culture - Bronchial (collected 11-19-24 @ 11:25)  Source: Bronch Wash  Gram Stain (11-19-24 @ 23:26):    Rare polymorphonuclear leukocytes per low power field    No Squamous epithelial cells per low power field    No organisms seen per oil power field  Final Report (11-21-24 @ 08:25):    Commensal rosalva consistent with body site    Culture - Viral, General (collected 11-12-24 @ 09:01)    Culture - Fungal, Bronchial (collected 11-12-24 @ 09:01)  Source: Bronchial  Preliminary Report (12-04-24 @ 23:00):    No fungus isolated at 3 weeks.    Culture - Acid Fast - Bronchial w/Smear (collected 11-12-24 @ 09:01)  Source: Bronch Wash  Preliminary Report (12-04-24 @ 23:04):    No acid-fast bacilli isolated at 3 weeks.    Culture - Bronchial (collected 11-12-24 @ 09:01)  Source: Bronch Wash  Gram Stain (11-12-24 @ 20:33):    No Squamous epithelial cells seen per low power field    Rare polymorphonuclear leukocytes seen per low power field    No organisms seen per oil power field  Final Report (11-14-24 @ 09:38):    Commensal rosalva consistent with body site    Culture - Sputum (collected 11-08-24 @ 12:36)  Source: Trach Asp Tracheal Aspirate  Gram Stain (11-09-24 @ 07:34):    Few polymorphonuclear leukocytes per low power field    No Squamous epithelial cells per low power field    Rare Gram positive cocci in pairs seen per oil power field    Rare Gram Negative Rods seen per oil power field  Final Report (11-10-24 @ 09:45):    No growth        Medications:  acetaminophen     Tablet .. 650 milliGRAM(s) Oral every 6 hours PRN  albuterol    90 MICROgram(s) HFA Inhaler 2 Puff(s) Inhalation every 6 hours PRN  amLODIPine   Tablet 10 milliGRAM(s) Oral daily  chlorhexidine 0.12% Liquid 15 milliLiter(s) Oral Mucosa every 12 hours  chlorhexidine 2% Cloths 1 Application(s) Topical <User Schedule>  dextrose 5%. 1000 milliLiter(s) IV Continuous <Continuous>  enoxaparin Injectable 90 milliGRAM(s) SubCutaneous every 12 hours  ipratropium 17 MICROgram(s) HFA Inhaler 2 Puff(s) Inhalation every 6 hours PRN  losartan 50 milliGRAM(s) Oral daily  nystatin Cream 1 Application(s) Topical two times a day  pantoprazole   Suspension 40 milliGRAM(s) Oral two times a day  potassium chloride   Powder 20 milliEquivalent(s) Oral once  vitamin A & D Ointment 1 Application(s) Topical two times a day        Assessment and Plan:  73-year-old female PMH A-fib on xarelto, HTN, s/p cholecystectomy, congenital solitary kidney presents from NH to the ED for evaluation of weakness, decreased PO intake and abdominal distension.     Admitted to ICU for septic shock requiring pressors 2/2 ESBL proteus (UTI vs. GI source) complicated by gastric distension w/ pneumatosis, NOLA 2/2 ATN, hypernatremia. s/p NGT decompression (-3.1 L feculent output) in ED. Intubated 11/6 for AHRF. EGD 11/8 w/ multiple ulcers, largest 10cm, path +gastritis. Now with new R colon/cecal mass pending colonoscopy when more stable.   Extubated 11/13 to AVAPs, weaned to HFNC however transitioned back to BiPAP after desaturation. Patient was weak and unable to clear secretions despite non-invasive measures. Reintubated 11/18 PM, s/p trach/PEJ 11/19 with thoracic sx.    # Norovirus Diarrhea- GI PCR on 11/23 and 1/24- positive x 2 , GI PCR ,  on contact isolation , supportive tx.  - stool cdiff on 11/23 neg  12/4: Diarrhea persists given  Imodium x 1 dose   12/5- decreased diarrhea, given imodium x 1 dose, monitor for BM's       #AHRF 2/2 septic shock due to ESBL proteus (UTI Vs. GI source) s/p intubation x2, s/p trach 11/19  #HAGMA 2/2 lactic acidosis and uremia - resolved   #NOLA likely ATN 2/2 septic shock - resolved   #Possible aspiration  #Congenital solitary kidney  - Intubated 11/6, extubated 11/13 to AVAPS, re-intubated 11/18, s/p trach 11/19  - UCx +ESBL proteus, BCX (-), trach culture with resp rosalva, sputum cx 11/12 resp rosalva  - off pressors, off sedation  - s/p abx (finished 11/13)  - c/w mechanical ventilation, SBT as tolerated   - pain management,  chest PT/MDI q6h  - post bronch on 11/20- neg , f/u bronch cx/gram stain (11/18-neg , 11/19- neg )      #Dysphagia 2/2 critical illness s/p PEJ  - PEJ w/ thoracic 11/19- tolerating feedings well  J port clogged (11/29). Try Clog Zapper. Or call Thoracic Surgery.    #R colon/cecal mass on CT A/P  #Multiple gastric ulcers iso chronic gastritis   #Abdominal distension w/ pneumatosis - resolved   #Diarrhea - recurrent on 11/23- obtained GI PCR  - CT A/P on admission with severe gastric distension with new pneumatosis along posterior gastric wall suspicious for ischemia, foci of free intraperitoneal air ; suspicious portal venous gas; thick bladder wall (cystitis vs colovesicular fistula)   - s/p EGD 11/8 with multiple ulcers, erosive gastritis:  EGD path: chronic gastritis (-) H pylori, no malignancy   - repeated CT A/P with PO contrast 11.8 w/ suspicious hepatic flexure narrowing and lobulated soft tissue density in R colon/cecum.   -  Cdiff (-)  -  PPI BID   - colonoscopy when more stable as per GI    #Hypernatremia- corrected . free water via peg, close  BMP  # Hypomagnesemia -corrected     #Sacral DTI - offloading, wound care, pressure injury prevention    #Afib-  on therapeutic lovenox tx.    #HTN - amlodipine 10mg qd PO, losartan 50mg qd PO (home meds)    #TRacheostomy bleeding: noted on 11/30, s/p surgicel by surgery. Stopped as of 12/1. Increased lovenox back from prophylactic to therapeutic dose.      DVT ppx:  on lovenox tx.    Diet: NPO w/ PEJ feeding  GI ppx/Bowel regimen: PPI BID    Code status: full code    Family discussion: current patient's condition and medical plan of care d/w family by bedside    #Progress Note Handoff: monitor for resolution of diarrhea,  electrolytes supplement PRN,  vent management as per Pulm. team    Disposition: SNF after diarrhea improves.     Total time spent to complete patient's bedside assessment, review medical chart, discuss medical plan of care with covering medical team was more than 35 minutes with >50% of time spent face to face with patient, discussion with patient/family and/or coordination of care .

## 2024-12-05 NOTE — PROGRESS NOTE ADULT - SUBJECTIVE AND OBJECTIVE BOX
Patient is a 74y old  Female who presents with a chief complaint of respiratory failure (04 Dec 2024 13:15)        Over Night Events:  On MV>         ROS:     All ROS are negative except HPI         PHYSICAL EXAM    ICU Vital Signs Last 24 Hrs  T(C): 36.8 (04 Dec 2024 19:20), Max: 36.8 (04 Dec 2024 12:00)  T(F): 98.3 (04 Dec 2024 19:20), Max: 98.3 (04 Dec 2024 12:00)  HR: 70 (04 Dec 2024 19:20) (68 - 78)  BP: 112/66 (04 Dec 2024 19:20) (112/66 - 128/74)  BP(mean): --  ABP: --  ABP(mean): --  RR: 18 (04 Dec 2024 19:20) (18 - 18)  SpO2: 100% (05 Dec 2024 00:35) (99% - 100%)    O2 Parameters below as of 04 Dec 2024 19:20  Patient On (Oxygen Delivery Method): ventilator            CONSTITUTIONAL:  NAD    ENT:   Airway patent,   Mouth with normal mucosa.   Trach     EYES:   Pupils equal,   Round and reactive to light.    CARDIAC:   Normal rate,   Regular rhythm.    No edema      RESPIRATORY:   No wheezing  Bilateral BS  Normal chest expansion  Not tachypneic,  No use of accessory muscles    GASTROINTESTINAL:  Abdomen soft,   Non-tender,   No guarding,   + BS    MUSCULOSKELETAL:   No clubbing, cyanosis    SKIN:   Skin normal color for race,   Warm and dry   No evidence of rash.          12-03-24 @ 07:01  -  12-04-24 @ 07:00  --------------------------------------------------------  IN:    Enteral Tube Flush: 200 mL    Peptamen A.F.: 455 mL  Total IN: 655 mL    OUT:    Rectal Tube (mL): 700 mL  Total OUT: 700 mL    Total NET: -45 mL      12-04-24 @ 07:01  -  12-05-24 @ 05:17  --------------------------------------------------------  IN:    Enteral Tube Flush: 120 mL    Peptamen A.F.: 840 mL  Total IN: 960 mL    OUT:  Total OUT: 0 mL    Total NET: 960 mL          LABS:                            8.5    8.64  )-----------( 216      ( 04 Dec 2024 08:01 )             26.4                                               12-04    140  |  111[H]  |  12  ----------------------------<  90  3.6   |  18  |  0.8    Ca    10.4      04 Dec 2024 08:01  Mg     3.0     12-04    TPro  5.5[L]  /  Alb  3.1[L]  /  TBili  0.8  /  DBili  x   /  AST  10  /  ALT  9   /  AlkPhos  93  12-04                                             Urinalysis Basic - ( 04 Dec 2024 08:01 )    Color: x / Appearance: x / SG: x / pH: x  Gluc: 90 mg/dL / Ketone: x  / Bili: x / Urobili: x   Blood: x / Protein: x / Nitrite: x   Leuk Esterase: x / RBC: x / WBC x   Sq Epi: x / Non Sq Epi: x / Bacteria: x                                                  LIVER FUNCTIONS - ( 04 Dec 2024 08:01 )  Alb: 3.1 g/dL / Pro: 5.5 g/dL / ALK PHOS: 93 U/L / ALT: 9 U/L / AST: 10 U/L / GGT: x                                                                                               Mode: AC/ CMV (Assist Control/ Continuous Mandatory Ventilation)  RR (machine): 14  TV (machine): 400  FiO2: 40  PEEP: 8  ITime: 0.8  MAP: 9  PIP: 18                                          MEDICATIONS  (STANDING):  amLODIPine   Tablet 10 milliGRAM(s) Oral daily  chlorhexidine 0.12% Liquid 15 milliLiter(s) Oral Mucosa every 12 hours  chlorhexidine 2% Cloths 1 Application(s) Topical <User Schedule>  dextrose 5%. 1000 milliLiter(s) (100 mL/Hr) IV Continuous <Continuous>  enoxaparin Injectable 90 milliGRAM(s) SubCutaneous every 12 hours  losartan 50 milliGRAM(s) Oral daily  nystatin Cream 1 Application(s) Topical two times a day  pantoprazole   Suspension 40 milliGRAM(s) Oral two times a day  potassium chloride   Powder 20 milliEquivalent(s) Oral once  vitamin A & D Ointment 1 Application(s) Topical two times a day    MEDICATIONS  (PRN):  acetaminophen     Tablet .. 650 milliGRAM(s) Oral every 6 hours PRN Temp greater or equal to 38C (100.4F)  albuterol    90 MICROgram(s) HFA Inhaler 2 Puff(s) Inhalation every 6 hours PRN Shortness of Breath and/or Wheezing  ipratropium 17 MICROgram(s) HFA Inhaler 2 Puff(s) Inhalation every 6 hours PRN SOb      New X-rays reviewed:                                                                                  ECHO

## 2024-12-05 NOTE — PROGRESS NOTE ADULT - ASSESSMENT
IMPRESSION:    Acute hypoxemic respiratory failure SP trach and PEG   Left lung atelectasis SP multiple bronchoscopies   Septic shock - resolved  UTI  Suspected colon mass  Severe gastric distension w/ pneumatosis along posterior gastric wall resolved  Possible aspiration / LLL consolidation  Thrombocytopenia improved   hypernatremia  Gastric ulcer   NOLA resolved  Afib on Xarelto  HO HTN  HO ESBL Proteus    PLAN:    CNS:  Pain control as needed .     HEENT: Oral care. Trach care.      PULMONARY: Vent changes;  Wean o2 as tolerated.  40%,  PEEP 8.  Keep O2 sat 92-96%.  Pulmonary toilet.  CXR noted.  PSV 2-4 hours as tolerated      CARDIOVASCULAR: Avoid overload.     GI: GI prophylaxis.  Tube feeding.  Bowel regimen.      RENAL:  Follow up lytes. Correct as needed.      INFECTIOUS DISEASE: SP Meropenem course.  Monitor VS     HEMATOLOGICAL: Therapeutic AC.  Monitor CBC.      ENDOCRINE:  Follow up FS. Insulin protocol if needed.    MUSCULOSKELETAL: Bed chair position.  Off loading    Full code.   Vent unit  DC planing

## 2024-12-05 NOTE — PROGRESS NOTE ADULT - SUBJECTIVE AND OBJECTIVE BOX
SUBJECTIVE/OVERNIGHT EVENTS  Today is hospital day 32d. This morning patient was seen and examined at bedside, resting comfortably in bed. No acute or major events overnight.      CODE STATUS:      PMH:  HTN (hypertension)    Diabetes mellitus    Obesity    Gastroesophageal reflux disease    Active asthma    Generalized OA    Afib          PSH:  H/O hernia repair    History of cholecystectomy          MEDICATIONS  STANDING MEDICATIONS  amLODIPine   Tablet 10 milliGRAM(s) Oral daily  chlorhexidine 0.12% Liquid 15 milliLiter(s) Oral Mucosa every 12 hours  chlorhexidine 2% Cloths 1 Application(s) Topical <User Schedule>  dextrose 5%. 1000 milliLiter(s) IV Continuous <Continuous>  enoxaparin Injectable 90 milliGRAM(s) SubCutaneous every 12 hours  losartan 50 milliGRAM(s) Oral daily  nystatin Cream 1 Application(s) Topical two times a day  pantoprazole   Suspension 40 milliGRAM(s) Oral two times a day  potassium chloride   Powder 20 milliEquivalent(s) Oral once  vitamin A & D Ointment 1 Application(s) Topical two times a day    PRN MEDICATIONS  acetaminophen     Tablet .. 650 milliGRAM(s) Oral every 6 hours PRN  albuterol    90 MICROgram(s) HFA Inhaler 2 Puff(s) Inhalation every 6 hours PRN  ipratropium 17 MICROgram(s) HFA Inhaler 2 Puff(s) Inhalation every 6 hours PRN    VITALS  T(F): 97.4 (12-05-24 @ 12:19), Max: 98.5 (12-05-24 @ 05:00)  HR: 80 (12-05-24 @ 12:19) (70 - 86)  BP: 99/54 (12-05-24 @ 12:19) (99/54 - 115/74)  RR: 18 (12-05-24 @ 12:19) (17 - 18)  SpO2: 100% (12-05-24 @ 14:26) (99% - 100%)  POCT Blood Glucose.: 106 mg/dL (12-05-24 @ 11:35)  POCT Blood Glucose.: 130 mg/dL (12-05-24 @ 06:15)  POCT Blood Glucose.: 120 mg/dL (12-04-24 @ 23:54)    PHYSICAL EXAM  GENERAL  ( X ) NAD, lying in bed comfortably     (  ) obtunded     (  ) lethargic     (  ) somnolent    HEAD  ( X ) Atraumatic     (  ) hematoma     (  ) laceration (specify location:       )     NECK  ( X ) Supple     (  ) neck stiffness     (  ) nuchal rigidity     (  )  no JVD     (  ) JVD present ( -- cm)   ( X ) trach present, no erythema or secretions     HEART  Rate -->  ( X ) normal rate    (  ) bradycardic    (  ) tachycardic  Rhythm -->  ( X ) regular    (  ) regularly irregular    (  ) irregularly irregular  Murmurs -->  ( X ) normal s1/s2    (  ) systolic murmur    (  ) diastolic murmur    (  ) continuous murmur     (  ) S3 present    (  ) S4 present    LUNGS  Tracheostomy  ( X )Unlabored respirations     (  ) tachypnea  ( X ) B/L air entry     (  ) decreased breath sounds in:  (location     )    ( x ) no adventitious sound     (  ) crackles     (  ) wheezing      (  ) rhonchi      (specify location:       )  (  ) chest wall tenderness (specify location:       )    ABDOMEN  PEG tube  ( X ) Soft     (  ) tense   |   ( X ) nondistended     (  ) distended   |   ( X ) +BS     (  ) hypoactive bowel sounds     (  ) hyperactive bowel sounds  ( X ) nontender     (  ) RUQ tenderness     (  ) RLQ tenderness     (  ) LLQ tenderness     (  ) epigastric tenderness     (  ) diffuse tenderness  (  ) Splenomegaly      (  ) Hepatomegaly      (  ) Jaundice     (  ) ecchymosis       EXTREMITIES  ( X ) Normal     (  ) Rash     (  ) ecchymosis     (  ) varicose veins      (  ) pitting edema     (  ) non-pitting edema   (  ) ulceration     (  ) gangrene:     (location:     )    NERVOUS SYSTEM  (  ) A&Ox3     (  ) confused     (  ) lethargic  CN II-XII:     (  ) Intact     (  ) focal deficits  (Specify:     )   Upper extremities:     (  ) strength X/5     (  ) focal deficit (specify:    )  Lower extremities:     (  ) strength  X/5    (  ) focal deficit (specify:    )    SKIN  ( X ) No rashes or lesions     (  ) maculopapular rash     (  ) pustules     (  ) vesicles     (  ) ulcer     (  ) ecchymosis     (specify location:     )    LABS             8.2    8.40  )-----------( 206      ( 12-05-24 @ 07:54 )             25.3     140  |  110  |  17  -------------------------<  93   12-05-24 @ 07:54  3.8  |  17  |  1.0    Ca      10.2     12-05-24 @ 07:54  Mg     2.2     12-05-24 @ 07:54    TPro  5.3  /  Alb  2.9  /  TBili  0.7  /  DBili  x   /  AST  10  /  ALT  8   /  AlkPhos  85  /  GGT  x     12-05-24 @ 07:54        Urinalysis Basic - ( 05 Dec 2024 07:54 )    Color: x / Appearance: x / SG: x / pH: x  Gluc: 93 mg/dL / Ketone: x  / Bili: x / Urobili: x   Blood: x / Protein: x / Nitrite: x   Leuk Esterase: x / RBC: x / WBC x   Sq Epi: x / Non Sq Epi: x / Bacteria: x          IMAGING

## 2024-12-06 LAB
ALBUMIN SERPL ELPH-MCNC: 3 G/DL — LOW (ref 3.5–5.2)
ALP SERPL-CCNC: 85 U/L — SIGNIFICANT CHANGE UP (ref 30–115)
ALT FLD-CCNC: 8 U/L — SIGNIFICANT CHANGE UP (ref 0–41)
ANION GAP SERPL CALC-SCNC: 11 MMOL/L — SIGNIFICANT CHANGE UP (ref 7–14)
AST SERPL-CCNC: 11 U/L — SIGNIFICANT CHANGE UP (ref 0–41)
BASOPHILS # BLD AUTO: 0.05 K/UL — SIGNIFICANT CHANGE UP (ref 0–0.2)
BASOPHILS NFR BLD AUTO: 0.6 % — SIGNIFICANT CHANGE UP (ref 0–1)
BILIRUB SERPL-MCNC: 0.7 MG/DL — SIGNIFICANT CHANGE UP (ref 0.2–1.2)
BUN SERPL-MCNC: 23 MG/DL — HIGH (ref 10–20)
CALCIUM SERPL-MCNC: 10.5 MG/DL — SIGNIFICANT CHANGE UP (ref 8.4–10.5)
CHLORIDE SERPL-SCNC: 109 MMOL/L — SIGNIFICANT CHANGE UP (ref 98–110)
CO2 SERPL-SCNC: 18 MMOL/L — SIGNIFICANT CHANGE UP (ref 17–32)
CREAT SERPL-MCNC: 0.9 MG/DL — SIGNIFICANT CHANGE UP (ref 0.7–1.5)
EGFR: 68 ML/MIN/1.73M2 — SIGNIFICANT CHANGE UP
EOSINOPHIL # BLD AUTO: 0.29 K/UL — SIGNIFICANT CHANGE UP (ref 0–0.7)
EOSINOPHIL NFR BLD AUTO: 3.3 % — SIGNIFICANT CHANGE UP (ref 0–8)
GLUCOSE BLDC GLUCOMTR-MCNC: 110 MG/DL — HIGH (ref 70–99)
GLUCOSE BLDC GLUCOMTR-MCNC: 130 MG/DL — HIGH (ref 70–99)
GLUCOSE BLDC GLUCOMTR-MCNC: 92 MG/DL — SIGNIFICANT CHANGE UP (ref 70–99)
GLUCOSE BLDC GLUCOMTR-MCNC: 97 MG/DL — SIGNIFICANT CHANGE UP (ref 70–99)
GLUCOSE SERPL-MCNC: 92 MG/DL — SIGNIFICANT CHANGE UP (ref 70–99)
HCT VFR BLD CALC: 26 % — LOW (ref 37–47)
HGB BLD-MCNC: 8.4 G/DL — LOW (ref 12–16)
IMM GRANULOCYTES NFR BLD AUTO: 0.8 % — HIGH (ref 0.1–0.3)
LYMPHOCYTES # BLD AUTO: 2.1 K/UL — SIGNIFICANT CHANGE UP (ref 1.2–3.4)
LYMPHOCYTES # BLD AUTO: 23.7 % — SIGNIFICANT CHANGE UP (ref 20.5–51.1)
MAGNESIUM SERPL-MCNC: 2.1 MG/DL — SIGNIFICANT CHANGE UP (ref 1.8–2.4)
MCHC RBC-ENTMCNC: 30 PG — SIGNIFICANT CHANGE UP (ref 27–31)
MCHC RBC-ENTMCNC: 32.3 G/DL — SIGNIFICANT CHANGE UP (ref 32–37)
MCV RBC AUTO: 92.9 FL — SIGNIFICANT CHANGE UP (ref 81–99)
MONOCYTES # BLD AUTO: 0.7 K/UL — HIGH (ref 0.1–0.6)
MONOCYTES NFR BLD AUTO: 7.9 % — SIGNIFICANT CHANGE UP (ref 1.7–9.3)
NEUTROPHILS # BLD AUTO: 5.64 K/UL — SIGNIFICANT CHANGE UP (ref 1.4–6.5)
NEUTROPHILS NFR BLD AUTO: 63.7 % — SIGNIFICANT CHANGE UP (ref 42.2–75.2)
NRBC # BLD: 0 /100 WBCS — SIGNIFICANT CHANGE UP (ref 0–0)
PLATELET # BLD AUTO: 189 K/UL — SIGNIFICANT CHANGE UP (ref 130–400)
PMV BLD: 12.1 FL — HIGH (ref 7.4–10.4)
POTASSIUM SERPL-MCNC: 3.9 MMOL/L — SIGNIFICANT CHANGE UP (ref 3.5–5)
POTASSIUM SERPL-SCNC: 3.9 MMOL/L — SIGNIFICANT CHANGE UP (ref 3.5–5)
PROT SERPL-MCNC: 5.6 G/DL — LOW (ref 6–8)
RBC # BLD: 2.8 M/UL — LOW (ref 4.2–5.4)
RBC # FLD: 14.6 % — HIGH (ref 11.5–14.5)
SODIUM SERPL-SCNC: 138 MMOL/L — SIGNIFICANT CHANGE UP (ref 135–146)
WBC # BLD: 8.85 K/UL — SIGNIFICANT CHANGE UP (ref 4.8–10.8)
WBC # FLD AUTO: 8.85 K/UL — SIGNIFICANT CHANGE UP (ref 4.8–10.8)

## 2024-12-06 PROCEDURE — 99232 SBSQ HOSP IP/OBS MODERATE 35: CPT

## 2024-12-06 RX ADMIN — NYSTATIN 1 APPLICATION(S): 100000 POWDER TOPICAL at 17:30

## 2024-12-06 RX ADMIN — PANTOPRAZOLE SODIUM 40 MILLIGRAM(S): 40 TABLET, DELAYED RELEASE ORAL at 06:31

## 2024-12-06 RX ADMIN — ENOXAPARIN SODIUM 90 MILLIGRAM(S): 30 INJECTION SUBCUTANEOUS at 17:31

## 2024-12-06 RX ADMIN — CHLORHEXIDINE GLUCONATE 15 MILLILITER(S): 1.2 RINSE ORAL at 17:27

## 2024-12-06 RX ADMIN — ENOXAPARIN SODIUM 90 MILLIGRAM(S): 30 INJECTION SUBCUTANEOUS at 06:30

## 2024-12-06 RX ADMIN — LOSARTAN POTASSIUM 50 MILLIGRAM(S): 100 TABLET, FILM COATED ORAL at 06:31

## 2024-12-06 RX ADMIN — Medication 2 MILLIGRAM(S): at 14:35

## 2024-12-06 RX ADMIN — NYSTATIN 1 APPLICATION(S): 100000 POWDER TOPICAL at 06:31

## 2024-12-06 RX ADMIN — Medication 1 APPLICATION(S): at 17:26

## 2024-12-06 RX ADMIN — CHLORHEXIDINE GLUCONATE 15 MILLILITER(S): 1.2 RINSE ORAL at 06:31

## 2024-12-06 RX ADMIN — PANTOPRAZOLE SODIUM 40 MILLIGRAM(S): 40 TABLET, DELAYED RELEASE ORAL at 17:26

## 2024-12-06 RX ADMIN — AMLODIPINE BESYLATE 10 MILLIGRAM(S): 10 TABLET ORAL at 06:31

## 2024-12-06 RX ADMIN — Medication 1 APPLICATION(S): at 06:31

## 2024-12-06 RX ADMIN — CHLORHEXIDINE GLUCONATE 1 APPLICATION(S): 1.2 RINSE ORAL at 06:31

## 2024-12-06 NOTE — PROGRESS NOTE ADULT - ATTENDING SUPERVISION STATEMENT
Resident
Fellow
Resident
Fellow
Resident
Fellow
Resident
Resident
Fellow

## 2024-12-06 NOTE — PROGRESS NOTE ADULT - SUBJECTIVE AND OBJECTIVE BOX
SUBJECTIVE/OVERNIGHT EVENTS  Today is hospital day 33d. This morning patient was seen and examined at bedside, resting comfortably in bed. No acute or major events overnight.      CODE STATUS:      PMH:  HTN (hypertension)    Diabetes mellitus    Obesity    Gastroesophageal reflux disease    Active asthma    Generalized OA    Afib          PSH:  H/O hernia repair    History of cholecystectomy          MEDICATIONS  STANDING MEDICATIONS  amLODIPine   Tablet 10 milliGRAM(s) Oral daily  chlorhexidine 0.12% Liquid 15 milliLiter(s) Oral Mucosa every 12 hours  chlorhexidine 2% Cloths 1 Application(s) Topical <User Schedule>  dextrose 5%. 1000 milliLiter(s) IV Continuous <Continuous>  enoxaparin Injectable 90 milliGRAM(s) SubCutaneous every 12 hours  losartan 50 milliGRAM(s) Oral daily  nystatin Cream 1 Application(s) Topical two times a day  pantoprazole   Suspension 40 milliGRAM(s) Oral two times a day  potassium chloride   Powder 20 milliEquivalent(s) Oral once  vitamin A & D Ointment 1 Application(s) Topical two times a day    PRN MEDICATIONS  acetaminophen     Tablet .. 650 milliGRAM(s) Oral every 6 hours PRN  albuterol    90 MICROgram(s) HFA Inhaler 2 Puff(s) Inhalation every 6 hours PRN  ipratropium 17 MICROgram(s) HFA Inhaler 2 Puff(s) Inhalation every 6 hours PRN  loperamide 2 milliGRAM(s) Oral every 6 hours PRN    VITALS  T(F): 98.1 (12-06-24 @ 12:51), Max: 98.1 (12-06-24 @ 12:51)  HR: 71 (12-06-24 @ 12:51) (71 - 87)  BP: 110/70 (12-06-24 @ 12:51) (101/58 - 110/70)  RR: 20 (12-06-24 @ 12:51) (17 - 20)  SpO2: 100% (12-06-24 @ 12:51) (99% - 100%)  POCT Blood Glucose.: 110 mg/dL (12-06-24 @ 05:54)  POCT Blood Glucose.: 132 mg/dL (12-05-24 @ 23:51)  POCT Blood Glucose.: 110 mg/dL (12-05-24 @ 21:30)    PHYSICAL EXAM  GENERAL  ( X ) NAD, lying in bed comfortably     (  ) obtunded     (  ) lethargic     (  ) somnolent    HEAD  ( X ) Atraumatic     (  ) hematoma     (  ) laceration (specify location:       )     NECK  ( X ) Supple     (  ) neck stiffness     (  ) nuchal rigidity     (  )  no JVD     (  ) JVD present ( -- cm)   ( X ) trach present, no erythema or secretions     HEART  Rate -->  ( X ) normal rate    (  ) bradycardic    (  ) tachycardic  Rhythm -->  ( X ) regular    (  ) regularly irregular    (  ) irregularly irregular  Murmurs -->  ( X ) normal s1/s2    (  ) systolic murmur    (  ) diastolic murmur    (  ) continuous murmur     (  ) S3 present    (  ) S4 present    LUNGS  Tracheostomy  ( X )Unlabored respirations     (  ) tachypnea  ( X ) B/L air entry     (  ) decreased breath sounds in:  (location     )    ( x ) no adventitious sound     (  ) crackles     (  ) wheezing      (  ) rhonchi      (specify location:       )  (  ) chest wall tenderness (specify location:       )    ABDOMEN  PEG tube  ( X ) Soft     (  ) tense   |   ( X ) nondistended     (  ) distended   |   ( X ) +BS     (  ) hypoactive bowel sounds     (  ) hyperactive bowel sounds  ( X ) nontender     (  ) RUQ tenderness     (  ) RLQ tenderness     (  ) LLQ tenderness     (  ) epigastric tenderness     (  ) diffuse tenderness  (  ) Splenomegaly      (  ) Hepatomegaly      (  ) Jaundice     (  ) ecchymosis       EXTREMITIES  ( X ) Normal     (  ) Rash     (  ) ecchymosis     (  ) varicose veins      (  ) pitting edema     (  ) non-pitting edema   (  ) ulceration     (  ) gangrene:     (location:     )    NERVOUS SYSTEM  (  ) A&Ox3     (  ) confused     (  ) lethargic  CN II-XII:     (  ) Intact     (  ) focal deficits  (Specify:     )   Upper extremities:     (  ) strength X/5     (  ) focal deficit (specify:    )  Lower extremities:     (  ) strength  X/5    (  ) focal deficit (specify:    )    SKIN  ( X ) No rashes or lesions     (  ) maculopapular rash     (  ) pustules     (  ) vesicles     (  ) ulcer     (  ) ecchymosis     (specify location:     )        LABS             8.4    8.85  )-----------( 189      ( 12-06-24 @ 07:49 )             26.0     138  |  109  |  23  -------------------------<  92   12-06-24 @ 07:49  3.9  |  18  |  0.9    Ca      10.5     12-06-24 @ 07:49  Mg     2.1     12-06-24 @ 07:49    TPro  5.6  /  Alb  3.0  /  TBili  0.7  /  DBili  x   /  AST  11  /  ALT  8   /  AlkPhos  85  /  GGT  x     12-06-24 @ 07:49        Urinalysis Basic - ( 06 Dec 2024 07:49 )    Color: x / Appearance: x / SG: x / pH: x  Gluc: 92 mg/dL / Ketone: x  / Bili: x / Urobili: x   Blood: x / Protein: x / Nitrite: x   Leuk Esterase: x / RBC: x / WBC x   Sq Epi: x / Non Sq Epi: x / Bacteria: x

## 2024-12-06 NOTE — PROGRESS NOTE ADULT - ASSESSMENT
IMPRESSION:    Acute hypoxemic respiratory failure SP trach and PEG   Left lung atelectasis SP multiple bronchoscopies   Septic shock - resolved  UTI  Suspected colon mass  Severe gastric distension w/ pneumatosis along posterior gastric wall resolved  Possible aspiration / LLL consolidation  Thrombocytopenia improved   hypernatremia  Gastric ulcer   NOLA resolved  Afib on Xarelto  HO HTN  HO ESBL Proteus    PLAN:    CNS:  Pain control as needed .     HEENT: Oral care. Trach care.      PULMONARY: Vent changes;  Wean o2 as tolerated.  40%,  PEEP 8.  Keep O2 sat 92-96%.  Pulmonary toilet.  CXR noted.  PSV 4-6 hours as tolerated      CARDIOVASCULAR: Avoid overload.     GI: GI prophylaxis.  Tube feeding.  Bowel regimen.      RENAL:  Follow up lytes. Correct as needed.      INFECTIOUS DISEASE: SP Meropenem course.  Monitor VS     HEMATOLOGICAL: Therapeutic AC.  Monitor CBC.      ENDOCRINE:  Follow up FS. Insulin protocol if needed.    MUSCULOSKELETAL: Bed chair position.  Off loading    Full code.   Vent unit  DC planing

## 2024-12-06 NOTE — PROGRESS NOTE ADULT - SUBJECTIVE AND OBJECTIVE BOX
Patient is a 74y old  Female who presents with a chief complaint of respiratory failure (05 Dec 2024 14:02)        Over Night Events:  On MV.  Off pressors.          ROS:     All ROS are negative except HPI         PHYSICAL EXAM    ICU Vital Signs Last 24 Hrs  T(C): 36.5 (06 Dec 2024 05:00), Max: 36.6 (05 Dec 2024 19:06)  T(F): 97.7 (06 Dec 2024 05:00), Max: 97.9 (05 Dec 2024 19:06)  HR: 79 (06 Dec 2024 05:00) (73 - 87)  BP: 107/68 (06 Dec 2024 05:00) (99/54 - 107/68)  BP(mean): --  ABP: --  ABP(mean): --  RR: 17 (06 Dec 2024 05:00) (17 - 19)  SpO2: 99% (06 Dec 2024 05:00) (99% - 100%)    O2 Parameters below as of 06 Dec 2024 05:00  Patient On (Oxygen Delivery Method): ventilator            CONSTITUTIONAL:  NAD    ENT:   Airway patent,   Mouth with normal mucosa.   Trach     EYES:   Pupils equal,   Round and reactive to light.    CARDIAC:   Normal rate,   Regular rhythm.      RESPIRATORY:   No wheezing  Bilateral BS  Normal chest expansion  Not tachypneic,  No use of accessory muscles    GASTROINTESTINAL:  Abdomen soft,   Non-tender,   No guarding,   + BS    MUSCULOSKELETAL:   Range of motion is not limited,  No clubbing, cyanosis    SKIN:   Skin normal color for race,   Warm and dry  No evidence of rash.      12-04-24 @ 07:01  -  12-05-24 @ 07:00  --------------------------------------------------------  IN:    Enteral Tube Flush: 120 mL    Peptamen A.F.: 840 mL  Total IN: 960 mL    OUT:    Rectal Tube (mL): 400 mL  Total OUT: 400 mL    Total NET: 560 mL      12-05-24 @ 07:01  -  12-06-24 @ 05:26  --------------------------------------------------------  IN:    Enteral Tube Flush: 220 mL    Peptamen A.F.: 840 mL  Total IN: 1060 mL    OUT:    Rectal Tube (mL): 100 mL  Total OUT: 100 mL    Total NET: 960 mL          LABS:                            8.2    8.40  )-----------( 206      ( 05 Dec 2024 07:54 )             25.3                                               12-05    140  |  110  |  17  ----------------------------<  93  3.8   |  17  |  1.0    Ca    10.2      05 Dec 2024 07:54  Mg     2.2     12-05    TPro  5.3[L]  /  Alb  2.9[L]  /  TBili  0.7  /  DBili  x   /  AST  10  /  ALT  8   /  AlkPhos  85  12-05                                             Urinalysis Basic - ( 05 Dec 2024 07:54 )    Color: x / Appearance: x / SG: x / pH: x  Gluc: 93 mg/dL / Ketone: x  / Bili: x / Urobili: x   Blood: x / Protein: x / Nitrite: x   Leuk Esterase: x / RBC: x / WBC x   Sq Epi: x / Non Sq Epi: x / Bacteria: x                                                  LIVER FUNCTIONS - ( 05 Dec 2024 07:54 )  Alb: 2.9 g/dL / Pro: 5.3 g/dL / ALK PHOS: 85 U/L / ALT: 8 U/L / AST: 10 U/L / GGT: x                                                                                               Mode: AC/ CMV (Assist Control/ Continuous Mandatory Ventilation)  RR (machine): 14  TV (machine): 400  FiO2: 40  PEEP: 8  ITime: 0.8  MAP: 11  PIP: 26                                          MEDICATIONS  (STANDING):  amLODIPine   Tablet 10 milliGRAM(s) Oral daily  chlorhexidine 0.12% Liquid 15 milliLiter(s) Oral Mucosa every 12 hours  chlorhexidine 2% Cloths 1 Application(s) Topical <User Schedule>  dextrose 5%. 1000 milliLiter(s) (100 mL/Hr) IV Continuous <Continuous>  enoxaparin Injectable 90 milliGRAM(s) SubCutaneous every 12 hours  losartan 50 milliGRAM(s) Oral daily  nystatin Cream 1 Application(s) Topical two times a day  pantoprazole   Suspension 40 milliGRAM(s) Oral two times a day  potassium chloride   Powder 20 milliEquivalent(s) Oral once  vitamin A & D Ointment 1 Application(s) Topical two times a day    MEDICATIONS  (PRN):  acetaminophen     Tablet .. 650 milliGRAM(s) Oral every 6 hours PRN Temp greater or equal to 38C (100.4F)  albuterol    90 MICROgram(s) HFA Inhaler 2 Puff(s) Inhalation every 6 hours PRN Shortness of Breath and/or Wheezing  ipratropium 17 MICROgram(s) HFA Inhaler 2 Puff(s) Inhalation every 6 hours PRN SOb      New X-rays reviewed:                                                                                  ECHO

## 2024-12-06 NOTE — PROGRESS NOTE ADULT - ATTENDING COMMENTS
73-year-old female PMH A-fib on xarelto, HTN, s/p cholecystectomy, congenital solitary kidney presents from NH to the ED for evaluation of weakness, decreased PO intake and abdominal distension.     Admitted to ICU for septic shock requiring pressors 2/2 ESBL proteus (UTI vs. GI source) complicated by gastric distension w/ pneumatosis, NOLA 2/2 ATN, hypernatremia. s/p NGT decompression (-3.1 L feculent output) in ED. Intubated 11/6 for AHRF. EGD 11/8 w/ multiple ulcers, largest 10cm, path +gastritis. Now with new R colon/cecal mass pending colonoscopy when more stable.   Extubated 11/13 to AVAPs, weaned to HFNC however transitioned back to BiPAP after desaturation. Patient was weak and unable to clear secretions despite non-invasive measures. Reintubated 11/18 PM, s/p trach/PEJ 11/19 with thoracic sx.    # Norovirus Diarrhea- GI PCR on 11/23 and 1/24- positive x 2 , GI PCR ,  on contact isolation , supportive tx.  - stool cdiff on 11/23 neg  12/6 - still with diarrhea, given imodium today, monitor for BM's      #AHRF 2/2 septic shock due to ESBL proteus (UTI Vs. GI source) s/p intubation x2, s/p trach 11/19  #HAGMA 2/2 lactic acidosis and uremia - resolved   #NOLA likely ATN 2/2 septic shock - resolved   #Possible aspiration  #Congenital solitary kidney  - [facility] 11/18, s/p trach 11/19  - UCx +ESBL proteus, BCX (-), trach culture with resp rosalva, sputum cx 11/12 resp rosalva  - off pressors, off sedation  - s/p abx (finished 11/13)  - c/w mechanical ventilation, SBT as tolerated   - pain management,  chest PT/MDI q6h  - post bronch on 11/20- neg , f/u bronch cx/gram stain (11/18-neg , 11/19- neg )      #Dysphagia 2/2 critical illness s/p PEJ  - PEJ w/ thoracic 11/19- tolerating feedings well  J port clogged (11/29). Try Clog Zapper. Or call Thoracic Surgery.    #R colon/cecal mass on CT A/P  #Multiple gastric ulcers iso chronic gastritis   #Abdominal distension w/ pneumatosis - resolved   #Diarrhea - recurrent on 11/23- obtained GI PCR  - CT A/P on admission with severe gastric distension with new pneumatosis along posterior gastric wall suspicious for ischemia, foci of free intraperitoneal air ; suspicious portal venous gas; thick bladder wall (cystitis vs colovesicular fistula)   - s/p EGD 11/8 with multiple ulcers, erosive gastritis:  EGD path: chronic gastritis (-) H pylori, no malignancy   - repeated CT A/P with PO contrast 11.8 w/ suspicious hepatic flexure narrowing and lobulated soft tissue density in R colon/cecum.   -  Cdiff (-)  -  PPI BID   - colonoscopy when more stable as per GI    #Hypernatremia- corrected . free water via peg, close  BMP  # Hypomagnesemia -corrected     #Sacral DTI - offloading, wound care, pressure injury prevention    #Afib-  on therapeutic lovenox tx.    #HTN - amlodipine 10mg qd PO, losartan 50mg qd PO (home meds)    #TRacheostomy bleeding: noted on 11/30, s/p surgicel by surgery. Stopped as of 12/1. Increased lovenox back from prophylactic to therapeutic dose.      DVT ppx:  on lovenox tx.    Diet: NPO w/ PEJ feeding  GI ppx/Bowel regimen: PPI BID    Code status: full code    Family discussion: current patient's condition and medical plan of care d/w family by bedside    #Progress Note Handoff: monitor for resolution of diarrhea,  electrolytes supplement PRN,  vent management as per Pulm. team    Disposition: SNF after diarrhea improves.     Total time spent to complete patient's bedside assessment, review medical chart, discuss medical plan of care with covering medical team was more than 35 minutes with >50% of time spent face to face with patient, discussion with patient/family and/or coordination of care .

## 2024-12-07 ENCOUNTER — TRANSCRIPTION ENCOUNTER (OUTPATIENT)
Age: 74
End: 2024-12-07

## 2024-12-07 LAB
ALBUMIN SERPL ELPH-MCNC: 3.1 G/DL — LOW (ref 3.5–5.2)
ALP SERPL-CCNC: 74 U/L — SIGNIFICANT CHANGE UP (ref 30–115)
ALT FLD-CCNC: 7 U/L — SIGNIFICANT CHANGE UP (ref 0–41)
ANION GAP SERPL CALC-SCNC: 8 MMOL/L — SIGNIFICANT CHANGE UP (ref 7–14)
AST SERPL-CCNC: 8 U/L — SIGNIFICANT CHANGE UP (ref 0–41)
BASOPHILS # BLD AUTO: 0.06 K/UL — SIGNIFICANT CHANGE UP (ref 0–0.2)
BASOPHILS NFR BLD AUTO: 0.6 % — SIGNIFICANT CHANGE UP (ref 0–1)
BILIRUB SERPL-MCNC: 0.7 MG/DL — SIGNIFICANT CHANGE UP (ref 0.2–1.2)
BUN SERPL-MCNC: 21 MG/DL — HIGH (ref 10–20)
CALCIUM SERPL-MCNC: 10.5 MG/DL — SIGNIFICANT CHANGE UP (ref 8.4–10.5)
CHLORIDE SERPL-SCNC: 111 MMOL/L — HIGH (ref 98–110)
CO2 SERPL-SCNC: 22 MMOL/L — SIGNIFICANT CHANGE UP (ref 17–32)
CREAT SERPL-MCNC: 0.9 MG/DL — SIGNIFICANT CHANGE UP (ref 0.7–1.5)
EGFR: 68 ML/MIN/1.73M2 — SIGNIFICANT CHANGE UP
EOSINOPHIL # BLD AUTO: 0.27 K/UL — SIGNIFICANT CHANGE UP (ref 0–0.7)
EOSINOPHIL NFR BLD AUTO: 2.5 % — SIGNIFICANT CHANGE UP (ref 0–8)
GLUCOSE BLDC GLUCOMTR-MCNC: 102 MG/DL — HIGH (ref 70–99)
GLUCOSE BLDC GLUCOMTR-MCNC: 125 MG/DL — HIGH (ref 70–99)
GLUCOSE BLDC GLUCOMTR-MCNC: 93 MG/DL — SIGNIFICANT CHANGE UP (ref 70–99)
GLUCOSE BLDC GLUCOMTR-MCNC: 97 MG/DL — SIGNIFICANT CHANGE UP (ref 70–99)
GLUCOSE SERPL-MCNC: 102 MG/DL — HIGH (ref 70–99)
HCT VFR BLD CALC: 26 % — LOW (ref 37–47)
HGB BLD-MCNC: 8.2 G/DL — LOW (ref 12–16)
IMM GRANULOCYTES NFR BLD AUTO: 0.4 % — HIGH (ref 0.1–0.3)
LYMPHOCYTES # BLD AUTO: 2.38 K/UL — SIGNIFICANT CHANGE UP (ref 1.2–3.4)
LYMPHOCYTES # BLD AUTO: 22.1 % — SIGNIFICANT CHANGE UP (ref 20.5–51.1)
MAGNESIUM SERPL-MCNC: 1.8 MG/DL — SIGNIFICANT CHANGE UP (ref 1.8–2.4)
MCHC RBC-ENTMCNC: 29.7 PG — SIGNIFICANT CHANGE UP (ref 27–31)
MCHC RBC-ENTMCNC: 31.5 G/DL — LOW (ref 32–37)
MCV RBC AUTO: 94.2 FL — SIGNIFICANT CHANGE UP (ref 81–99)
MONOCYTES # BLD AUTO: 0.73 K/UL — HIGH (ref 0.1–0.6)
MONOCYTES NFR BLD AUTO: 6.8 % — SIGNIFICANT CHANGE UP (ref 1.7–9.3)
NEUTROPHILS # BLD AUTO: 7.27 K/UL — HIGH (ref 1.4–6.5)
NEUTROPHILS NFR BLD AUTO: 67.6 % — SIGNIFICANT CHANGE UP (ref 42.2–75.2)
NRBC # BLD: 0 /100 WBCS — SIGNIFICANT CHANGE UP (ref 0–0)
PLATELET # BLD AUTO: 218 K/UL — SIGNIFICANT CHANGE UP (ref 130–400)
PMV BLD: 11.9 FL — HIGH (ref 7.4–10.4)
POTASSIUM SERPL-MCNC: 4 MMOL/L — SIGNIFICANT CHANGE UP (ref 3.5–5)
POTASSIUM SERPL-SCNC: 4 MMOL/L — SIGNIFICANT CHANGE UP (ref 3.5–5)
PROT SERPL-MCNC: 5.1 G/DL — LOW (ref 6–8)
RBC # BLD: 2.76 M/UL — LOW (ref 4.2–5.4)
RBC # FLD: 14.4 % — SIGNIFICANT CHANGE UP (ref 11.5–14.5)
SODIUM SERPL-SCNC: 141 MMOL/L — SIGNIFICANT CHANGE UP (ref 135–146)
WBC # BLD: 10.75 K/UL — SIGNIFICANT CHANGE UP (ref 4.8–10.8)
WBC # FLD AUTO: 10.75 K/UL — SIGNIFICANT CHANGE UP (ref 4.8–10.8)

## 2024-12-07 PROCEDURE — 99232 SBSQ HOSP IP/OBS MODERATE 35: CPT

## 2024-12-07 RX ADMIN — NYSTATIN 1 APPLICATION(S): 100000 POWDER TOPICAL at 17:36

## 2024-12-07 RX ADMIN — PANTOPRAZOLE SODIUM 40 MILLIGRAM(S): 40 TABLET, DELAYED RELEASE ORAL at 06:22

## 2024-12-07 RX ADMIN — ENOXAPARIN SODIUM 90 MILLIGRAM(S): 30 INJECTION SUBCUTANEOUS at 06:23

## 2024-12-07 RX ADMIN — LOSARTAN POTASSIUM 50 MILLIGRAM(S): 100 TABLET, FILM COATED ORAL at 06:23

## 2024-12-07 RX ADMIN — Medication 1 APPLICATION(S): at 06:22

## 2024-12-07 RX ADMIN — CHLORHEXIDINE GLUCONATE 15 MILLILITER(S): 1.2 RINSE ORAL at 06:23

## 2024-12-07 RX ADMIN — NYSTATIN 1 APPLICATION(S): 100000 POWDER TOPICAL at 06:23

## 2024-12-07 RX ADMIN — Medication 1 APPLICATION(S): at 17:35

## 2024-12-07 RX ADMIN — AMLODIPINE BESYLATE 10 MILLIGRAM(S): 10 TABLET ORAL at 06:22

## 2024-12-07 RX ADMIN — ENOXAPARIN SODIUM 90 MILLIGRAM(S): 30 INJECTION SUBCUTANEOUS at 17:35

## 2024-12-07 RX ADMIN — CHLORHEXIDINE GLUCONATE 15 MILLILITER(S): 1.2 RINSE ORAL at 17:35

## 2024-12-07 RX ADMIN — CHLORHEXIDINE GLUCONATE 1 APPLICATION(S): 1.2 RINSE ORAL at 06:23

## 2024-12-07 RX ADMIN — PANTOPRAZOLE SODIUM 40 MILLIGRAM(S): 40 TABLET, DELAYED RELEASE ORAL at 17:35

## 2024-12-07 NOTE — DISCHARGE NOTE PROVIDER - HOSPITAL COURSE
73-year-old female PMH A-fib on xarelto,, HTN, s/p cholecystectomy, congenital solitary kidney presents from NH to the ED for evaluation of weakness, decreased PO intake and abdominal distension. Pt's son Everton says pt has not been feeling well the past few days, and has had poor appetite which is unlike her. She's been more tired and weak, unable to get out of bed. Has not had a bowel movement in 3 days, pt unsure if she's been passing gas otherwise. She was noted to be hypotensive and have an elevated wbc count at the NH as well. No dysuria, chest pain, SOB, cough or fever per pt otherwise. No other complaints currently.  In the ED, BP 66/45 S/P LR 2800cc bolus with no improvement in BP, started on peripheral levo. Labs with wbc 19.29, vbg lactate 2.0-->2.3, pH 7.2, pCO2 45, Na 133, AG 21, Cr 4.3 (baseline 1.1), UA contaminated. CTAP with severe gastric distension, multiple foci of intraperitoneal free air and urinary bladder thickening with free air. Surgery consulted, no intervention for now, NGT placed for gastric decompression with 3.1L feculent output, recommend GI consult.       She was initially admitted to the ICU for septic shock 2/2 ESBL Proteus infection requiring pressors. Gastric distension with pneumatosis and acute kidney injury secondary to acute tubular necrosis were also identified. She required intubation on November 6th due to acute hypoxemic respiratory failure and was later extubated on November 13th to AVAPs, although she required reintubation on November 18th following desaturation. Subsequently, on November 19th, a tracheostomy and PEJ tube were placed.    CT A/P on admission showed severe gastric distension with new pneumatosis along posterior gastric wall suspicious for ischemia, foci of free intraperitoneal air ; suspicious portal venous gas; thick bladder wall (cystitis vs colovesicular fistula). On 11/8 EGD showed multiple ulcers, erosive gastritis:  EGD path: chronic gastritis (-) H pylori, no malignancy. Repeated CT A/P with PO contrast showed suspicious hepatic flexure narrowing and lobulated soft tissue density in R colon/cecum. Pt needs evaluation via colonoscopy once stabilizes.       During her stay, the patient developed diarrhea 2/2 Norovirus. The diarrhea persisted, and stool tests for Clostridioides difficile returned negative. 73-year-old female PMH A-fib on xarelto,, HTN, s/p cholecystectomy, congenital solitary kidney presents from NH to the ED for evaluation of weakness, decreased PO intake and abdominal distension. Pt's son Everton says pt has not been feeling well the past few days, and has had poor appetite which is unlike her. She's been more tired and weak, unable to get out of bed. Has not had a bowel movement in 3 days, pt unsure if she's been passing gas otherwise. She was noted to be hypotensive and have an elevated wbc count at the NH as well. No dysuria, chest pain, SOB, cough or fever per pt otherwise. No other complaints currently.  In the ED, BP 66/45 S/P LR 2800cc bolus with no improvement in BP, started on peripheral levo. Labs with wbc 19.29, vbg lactate 2.0-->2.3, pH 7.2, pCO2 45, Na 133, AG 21, Cr 4.3 (baseline 1.1), UA contaminated. CTAP with severe gastric distension, multiple foci of intraperitoneal free air and urinary bladder thickening with free air. Surgery consulted, no intervention for now, NGT placed for gastric decompression with 3.1L feculent output, recommend GI consult.       She was initially admitted to the ICU for septic shock 2/2 ESBL Proteus infection requiring pressors. Gastric distension with pneumatosis and acute kidney injury secondary to acute tubular necrosis were also identified. She required intubation on November 6th due to acute hypoxemic respiratory failure and was later extubated on November 13th to AVAPs, although she required reintubation on November 18th following desaturation. Subsequently, on November 19th, a tracheostomy and PEJ tube were placed.    CT A/P on admission showed severe gastric distension with new pneumatosis along posterior gastric wall suspicious for ischemia, foci of free intraperitoneal air ; suspicious portal venous gas; thick bladder wall (cystitis vs colovesicular fistula). On 11/8 EGD showed multiple ulcers, erosive gastritis:  EGD path: chronic gastritis (-) H pylori, no malignancy. Repeated CT A/P with PO contrast showed suspicious hepatic flexure narrowing and lobulated soft tissue density in R colon/cecum. Pt needs evaluation via colonoscopy once stabilizes post discharge to Nursing Home.     During her stay, the patient developed diarrhea 2/2 Norovirus. The diarrhea persisted, and stool tests for Clostridium  difficile returned negative. Patient had  noted to have decreased diarrhea. Patient insisted on feedings to be Peptamen 1.2 instead of recommended Peptamen 1.5 by Nutritionist team.     At Nursing Home patient will need to be continued on daily Pressure Support trials with Pulmonary specialist f/up  for vent management.     PT/OT tx. as tolerated 73-year-old female PMH A-fib on xarelto,, HTN, s/p cholecystectomy, congenital solitary kidney presents from NH to the ED for evaluation of weakness, decreased PO intake and abdominal distension. Pt's son Everton says pt has not been feeling well the past few days, and has had poor appetite which is unlike her. She's been more tired and weak, unable to get out of bed. Has not had a bowel movement in 3 days, pt unsure if she's been passing gas otherwise. She was noted to be hypotensive and have an elevated wbc count at the NH as well. No dysuria, chest pain, SOB, cough or fever per pt otherwise. No other complaints currently.  In the ED, BP 66/45 S/P LR 2800cc bolus with no improvement in BP, started on peripheral levo. Labs with wbc 19.29, vbg lactate 2.0-->2.3, pH 7.2, pCO2 45, Na 133, AG 21, Cr 4.3 (baseline 1.1), UA contaminated. CTAP with severe gastric distension, multiple foci of intraperitoneal free air and urinary bladder thickening with free air. Surgery consulted, no intervention for now, NGT placed for gastric decompression with 3.1L feculent output, recommend GI consult.       She was initially admitted to the ICU for septic shock 2/2 ESBL Proteus infection requiring pressors. Gastric distension with pneumatosis and acute kidney injury secondary to acute tubular necrosis were also identified. She required intubation on November 6th due to acute hypoxemic respiratory failure and was later extubated on November 13th to AVAPs, although she required reintubation on November 18th following desaturation. Subsequently, on November 19th, a tracheostomy and PEJ tube were placed.    CT A/P on admission showed severe gastric distension with new pneumatosis along posterior gastric wall suspicious for ischemia, foci of free intraperitoneal air ; suspicious portal venous gas; thick bladder wall (cystitis vs colovesicular fistula). On 11/8 EGD showed multiple ulcers, erosive gastritis:  EGD path: chronic gastritis (-) H pylori, no malignancy. Repeated CT A/P with PO contrast showed suspicious hepatic flexure narrowing and lobulated soft tissue density in R colon/cecum. Pt needs evaluation via colonoscopy once stabilizes post discharge to Nursing Home. Patient will need to be continued on Protonix twice daily therapy, and scheduled to repeat diagnostic EGD in 2 months ( From January 08,2025)    During her stay, the patient developed diarrhea 2/2 Norovirus. The diarrhea persisted, and stool tests for Clostridium  difficile returned negative. Patient had  noted to have decreased diarrhea. Patient insisted on feedings to be Peptamen 1.2 instead of recommended Peptamen 1.5 by Nutritionist team.     At Nursing Home patient will need to be continued on daily Pressure Support trials with Pulmonary specialist f/up  for vent management.     PT/OT tx. as tolerated

## 2024-12-07 NOTE — DISCHARGE NOTE PROVIDER - CARE PROVIDERS DIRECT ADDRESSES
,[email or web address] by [email or web address] ,[email or web address],[email or web address]

## 2024-12-07 NOTE — DISCHARGE NOTE PROVIDER - NSDCFUADDINST_GEN_ALL_CORE_FT
Mode: AC/ CMV (Assist Control/ Continuous Mandatory Ventilation)  RR (machine): 14  TV (machine): 400  FiO2: 40  PEEP: 8  ITime: 0.9  MAP: 11  PIP: 19  Monitor weekly Labs: CBC, CMP, Magnesium, Phosphorus

## 2024-12-07 NOTE — PROGRESS NOTE ADULT - SUBJECTIVE AND OBJECTIVE BOX
PATRICIA SPRAGUE  [facility]-N 3A 024 A (SI-N 3A)      Patient was evaluated and examined  by bedside, no diarrhea reported today, tolerating PS       REVIEW OF SYSTEMS: unable to obtain, patient is a poor historian         T(C): , Max: 36.6 (12-06-24 @ 19:23)  HR: 69 (12-07-24 @ 14:40)  BP: 119/67 (12-07-24 @ 12:00)  RR: 18 (12-07-24 @ 12:00)  SpO2: 98% (12-07-24 @ 14:40)  CAPILLARY BLOOD GLUCOSE      POCT Blood Glucose.: 93 mg/dL (07 Dec 2024 11:31)  POCT Blood Glucose.: 102 mg/dL (07 Dec 2024 06:15)  POCT Blood Glucose.: 125 mg/dL (07 Dec 2024 04:22)  POCT Blood Glucose.: 92 mg/dL (06 Dec 2024 23:35)  POCT Blood Glucose.: 130 mg/dL (06 Dec 2024 22:22)  POCT Blood Glucose.: 97 mg/dL (06 Dec 2024 16:48)      PHYSICAL EXAM:  General: NAD, AAOX3, patient is laying comfortably in bed  HEENT: AT, NC, Supple, trach present  Lungs: CTA B/L, no wheezing, no rhonchi  CVS: normal S1, S2, RRR, NO M/G/R  Abdomen: soft, bowel sounds present, non-tender, non-distended ,peg present   Extremities: no edema, no clubbing, no cyanosis, positive peripheral pulses b/l  Neuro: generalized body weakness  Skin: no rash, no ecchymosis      LAB  CBC  Date: 12-07-24 @ 07:59  Mean cell Ciimkfpgtt27.7  Mean cell Hemoglobin Conc31.5  Mean cell Volum 94.2  Platelet count-Automate 218  RBC Count 2.76  Red Cell Distrib Width14.4  WBC Count10.75  % Albumin, Urine--  Hematocrit 26.0  Hemoglobin 8.2  CBC  Date: 12-06-24 @ 07:49  Mean cell Tcsjjjjgkn10.0  Mean cell Hemoglobin Conc32.3  Mean cell Volum 92.9  Platelet count-Automate 189  RBC Count 2.80  Red Cell Distrib Width14.6  WBC Count8.85  % Albumin, Urine--  Hematocrit 26.0  Hemoglobin 8.4  CBC  Date: 12-05-24 @ 07:54  Mean cell Lioqagnaba11.3  Mean cell Hemoglobin Conc32.4  Mean cell Volum 93.4  Platelet count-Automate 206  RBC Count 2.71  Red Cell Distrib Width14.6  WBC Count8.40  % Albumin, Urine--  Hematocrit 25.3  Hemoglobin 8.2  CBC  Date: 12-04-24 @ 08:01  Mean cell Quehdxjgux94.1  Mean cell Hemoglobin Conc32.2  Mean cell Volum 93.6  Platelet count-Automate 216  RBC Count 2.82  Red Cell Distrib Width14.4  WBC Count8.64  % Albumin, Urine--  Hematocrit 26.4  Hemoglobin 8.5          [facility]  12-07-24 @ 07:59  Blood Gas Arterial-Calcium,Ionized--  Blood Urea Nitrogen, Serum 21 mg/dL[H] [10 - 20]  Carbon Dioxide, Serum22 mmol/L [17 - 32]  Chloride, Aqmeq031 mmol/L[H] [98 - 110]  Creatinie, Serum0.9 mg/dL [0.7 - 1.5]  Glucose, Ixhwp918 mg/dL[H] [70 - 99]  Potassium, Serum4.0 mmol/L [3.5 - 5.0]  Sodium, Serum 141 mmol/L [135 - 146]  [facility]  12-06-24 @ 07:49  Blood Gas Arterial-Calcium,Ionized--  Blood Urea Nitrogen, Serum 23 mg/dL[H] [10 - 20]  Carbon Dioxide, Serum18 mmol/L [17 - 32]  Chloride, Lzhsp955 mmol/L [98 - 110]  Creatinie, Serum0.9 mg/dL [0.7 - 1.5]  Glucose, Serum92 mg/dL [70 - 99]  Potassium, Serum3.9 mmol/L [3.5 - 5.0]  Sodium, Serum 138 mmol/L [135 - 146]  [facility]  12-05-24 @ 07:54  Blood Gas Arterial-Calcium,Ionized--  Blood Urea Nitrogen, Serum 17 mg/dL [10 - 20]  Carbon Dioxide, Serum17 mmol/L [17 - 32]  Chloride, Vclll495 mmol/L [98 - 110]  Creatinie, Serum1.0 mg/dL [0.7 - 1.5]  Glucose, Serum93 mg/dL [70 - 99]  Potassium, Serum3.8 mmol/L [3.5 - 5.0]  Sodium, Serum 140 mmol/L [135 - 146]        Microbiology:    Culture - Stool (collected 11-23-24 @ 14:11)  Source: .Stool  Final Report (11-25-24 @ 22:02):    No enteric pathogens isolated.    (Stool culture examined for Salmonella,    Shigella, Campylobacter, Aeromonas, Plesiomonas,    Vibrio, E.coli O157 and Yersinia)    Culture - Fungal, Bronchial (collected 11-20-24 @ 12:00)  Source: Bronchial  Preliminary Report (12-04-24 @ 23:01):    No fungus isolated at 2 weeks.    Culture - Acid Fast - Bronchial w/Smear (collected 11-20-24 @ 12:00)  Source: Bronchial  Preliminary Report (12-04-24 @ 23:05):    No acid-fast bacilli isolated at 2 weeks.    Culture - Bronchial (collected 11-20-24 @ 12:00)  Source: Bronchial  Gram Stain (11-20-24 @ 23:18):    Moderate polymorphonuclear leukocytes per low power field    No squamous epithelial cells    Few Gram Positive Cocci in Clusters per oil power field  Final Report (11-22-24 @ 09:09):    Commensal rosalva consistent with body site    Culture - Fungal, Bronchial (collected 11-19-24 @ 11:25)  Source: Bronchial  Preliminary Report (12-04-24 @ 23:01):    No fungus isolated at 2 weeks.    Culture - Acid Fast - Bronchial w/Smear (collected 11-19-24 @ 11:25)  Source: Bronchial  Preliminary Report (12-04-24 @ 23:05):    No acid-fast bacilli isolated at 2 weeks.    Culture - Bronchial (collected 11-19-24 @ 11:25)  Source: Bronch Wash  Gram Stain (11-19-24 @ 23:26):    Rare polymorphonuclear leukocytes per low power field    No Squamous epithelial cells per low power field    No organisms seen per oil power field  Final Report (11-21-24 @ 08:25):    Commensal rosalva consistent with body site    Culture - Viral, General (collected 11-12-24 @ 09:01)    Culture - Fungal, Bronchial (collected 11-12-24 @ 09:01)  Source: Bronchial  Preliminary Report (12-04-24 @ 23:00):    No fungus isolated at 3 weeks.    Culture - Acid Fast - Bronchial w/Smear (collected 11-12-24 @ 09:01)  Source: Bronch Wash  Preliminary Report (12-04-24 @ 23:04):    No acid-fast bacilli isolated at 3 weeks.    Culture - Bronchial (collected 11-12-24 @ 09:01)  Source: Bronch Wash  Gram Stain (11-12-24 @ 20:33):    No Squamous epithelial cells seen per low power field    Rare polymorphonuclear leukocytes seen per low power field    No organisms seen per oil power field  Final Report (11-14-24 @ 09:38):    Commensal rosalva consistent with body site    Culture - Sputum (collected 11-08-24 @ 12:36)  Source: Trach Asp Tracheal Aspirate  Gram Stain (11-09-24 @ 07:34):    Few polymorphonuclear leukocytes per low power field    No Squamous epithelial cells per low power field    Rare Gram positive cocci in pairs seen per oil power field    Rare Gram Negative Rods seen per oil power field  Final Report (11-10-24 @ 09:45):    No growth        Medications:  acetaminophen     Tablet .. 650 milliGRAM(s) Oral every 6 hours PRN  albuterol    90 MICROgram(s) HFA Inhaler 2 Puff(s) Inhalation every 6 hours PRN  chlorhexidine 0.12% Liquid 15 milliLiter(s) Oral Mucosa every 12 hours  chlorhexidine 2% Cloths 1 Application(s) Topical <User Schedule>  enoxaparin Injectable 90 milliGRAM(s) SubCutaneous every 12 hours  ipratropium 17 MICROgram(s) HFA Inhaler 2 Puff(s) Inhalation every 6 hours PRN  loperamide 2 milliGRAM(s) Oral every 6 hours PRN  losartan 50 milliGRAM(s) Oral daily  nystatin Cream 1 Application(s) Topical two times a day  pantoprazole   Suspension 40 milliGRAM(s) Oral two times a day  potassium chloride   Powder 20 milliEquivalent(s) Oral once  vitamin A & D Ointment 1 Application(s) Topical two times a day        Assessment and Plan:  73-year-old female PMH A-fib on xarelto, HTN, s/p cholecystectomy, congenital solitary kidney presents from NH to the ED for evaluation of weakness, decreased PO intake and abdominal distension.     Admitted to ICU for septic shock requiring pressors 2/2 ESBL proteus (UTI vs. GI source) complicated by gastric distension w/ pneumatosis, NOLA 2/2 ATN, hypernatremia. s/p NGT decompression (-3.1 L feculent output) in ED. Intubated 11/6 for AHRF. EGD 11/8 w/ multiple ulcers, largest 10cm, path +gastritis. Now with new R colon/cecal mass pending colonoscopy when more stable.   Extubated 11/13 to AVAPs, weaned to HFNC however transitioned back to BiPAP after desaturation. Patient was weak and unable to clear secretions despite non-invasive measures. Reintubated 11/18 PM, s/p trach/PEJ 11/19 with thoracic sx.    # Norovirus Diarrhea- GI PCR on 11/23 and 1/24- positive x 2 , GI PCR ,  on contact isolation , supportive tx.  - stool cdiff on 11/23 neg  12/7 - no diarrhea, reported,  monitor for BM's      #AHRF 2/2 septic shock due to ESBL proteus (UTI Vs. GI source) s/p intubation x2, s/p trach 11/19  #HAGMA 2/2 lactic acidosis and uremia - resolved   #NOAL likely ATN 2/2 septic shock - resolved   #Possible aspiration  #Congenital solitary kidney  - Intubated 11/6, extubated 11/13 to AVAPS, re-intubated 11/18, s/p trach 11/19  - UCx +ESBL proteus, BCX (-), trach culture with resp rosalva, sputum cx 11/12 resp rosalva  - off pressors, off sedation  - s/p abx (finished 11/13)  - c/w mechanical ventilation, SBT as tolerated   - pain management,  chest PT/MDI q6h  - post bronch on 11/20- neg , f/u bronch cx/gram stain (11/18-neg , 11/19- neg )      #Dysphagia 2/2 critical illness s/p PEJ  - PEJ w/ thoracic 11/19- tolerating feedings well  J port clogged (11/29). Try Clog Zapper. Or call Thoracic Surgery.    #R colon/cecal mass on CT A/P  #Multiple gastric ulcers iso chronic gastritis   #Abdominal distension w/ pneumatosis - resolved   #Diarrhea - recurrent on 11/23- obtained GI PCR  - CT A/P on admission with severe gastric distension with new pneumatosis along posterior gastric wall suspicious for ischemia, foci of free intraperitoneal air ; suspicious portal venous gas; thick bladder wall (cystitis vs colovesicular fistula)   - s/p EGD 11/8 with multiple ulcers, erosive gastritis:  EGD path: chronic gastritis (-) H pylori, no malignancy   - repeated CT A/P with PO contrast 11.8 w/ suspicious hepatic flexure narrowing and lobulated soft tissue density in R colon/cecum.   -  Cdiff (-)  -  PPI BID   - colonoscopy when more stable as per GI    #Hypernatremia- corrected . free water via peg, close  BMP  # Hypomagnesemia -corrected     #Sacral DTI - offloading, wound care, pressure injury prevention    #Afib-  on therapeutic lovenox tx.    #HTN - amlodipine 10mg qd PO, losartan 50mg qd PO (home meds)    #TRacheostomy bleeding: noted on 11/30, s/p surgicel by surgery. Stopped as of 12/1. Increased lovenox back from prophylactic to therapeutic dose.      DVT ppx:  on lovenox tx.    Diet: NPO w/ PEJ feeding  GI ppx/Bowel regimen: PPI BID    Code status: full code      #Progress Note Handoff: monitor for resolution of diarrhea,  electrolytes supplement PRN,  vent management as per Pulm. team  #Family Discussion: yes, medical team     Disposition: SNF in 24 hours     Total time spent to complete patient's bedside assessment, review medical chart, discuss medical plan of care with covering medical team was more than 35 minutes with >50% of time spent face to face with patient, discussion with patient/family and/or coordination of care .        PATRICIA SPRAGUE  [facility]-N 3A 024 A (SI-N 3A)      Patient was evaluated and examined  by bedside, no diarrhea reported today, tolerating PS       REVIEW OF SYSTEMS: unable to obtain, patient is a poor historian         T(C): , Max: 36.6 (12-06-24 @ 19:23)  HR: 69 (12-07-24 @ 14:40)  BP: 119/67 (12-07-24 @ 12:00)  RR: 18 (12-07-24 @ 12:00)  SpO2: 98% (12-07-24 @ 14:40)  CAPILLARY BLOOD GLUCOSE      POCT Blood Glucose.: 93 mg/dL (07 Dec 2024 11:31)  POCT Blood Glucose.: 102 mg/dL (07 Dec 2024 06:15)  POCT Blood Glucose.: 125 mg/dL (07 Dec 2024 04:22)  POCT Blood Glucose.: 92 mg/dL (06 Dec 2024 23:35)  POCT Blood Glucose.: 130 mg/dL (06 Dec 2024 22:22)  POCT Blood Glucose.: 97 mg/dL (06 Dec 2024 16:48)      PHYSICAL EXAM:  General: NAD, AAOX3, patient is laying comfortably in bed  HEENT: AT, NC, Supple, trach present  Lungs: CTA B/L, no wheezing, no rhonchi  CVS: normal S1, S2, RRR, NO M/G/R  Abdomen: soft, bowel sounds present, non-tender, non-distended ,peg present   Extremities: no edema, no clubbing, no cyanosis, positive peripheral pulses b/l  Neuro: generalized body weakness  Skin: no rash, no ecchymosis      LAB  CBC  Date: 12-07-24 @ 07:59  Mean cell Dsxoouwelx00.7  Mean cell Hemoglobin Conc31.5  Mean cell Volum 94.2  Platelet count-Automate 218  RBC Count 2.76  Red Cell Distrib Width14.4  WBC Count10.75  % Albumin, Urine--  Hematocrit 26.0  Hemoglobin 8.2  CBC  Date: 12-06-24 @ 07:49  Mean cell Pjmasuwgpe49.0  Mean cell Hemoglobin Conc32.3  Mean cell Volum 92.9  Platelet count-Automate 189  RBC Count 2.80  Red Cell Distrib Width14.6  WBC Count8.85  % Albumin, Urine--  Hematocrit 26.0  Hemoglobin 8.4  CBC  Date: 12-05-24 @ 07:54  Mean cell Iwtkpjxdlt33.3  Mean cell Hemoglobin Conc32.4  Mean cell Volum 93.4  Platelet count-Automate 206  RBC Count 2.71  Red Cell Distrib Width14.6  WBC Count8.40  % Albumin, Urine--  Hematocrit 25.3  Hemoglobin 8.2  CBC  Date: 12-04-24 @ 08:01  Mean cell Ayqmtidkab70.1  Mean cell Hemoglobin Conc32.2  Mean cell Volum 93.6  Platelet count-Automate 216  RBC Count 2.82  Red Cell Distrib Width14.4  WBC Count8.64  % Albumin, Urine--  Hematocrit 26.4  Hemoglobin 8.5          [facility]  12-07-24 @ 07:59  Blood Gas Arterial-Calcium,Ionized--  Blood Urea Nitrogen, Serum 21 mg/dL[H] [10 - 20]  Carbon Dioxide, Serum22 mmol/L [17 - 32]  Chloride, Vrrae446 mmol/L[H] [98 - 110]  Creatinie, Serum0.9 mg/dL [0.7 - 1.5]  Glucose, Iouvl590 mg/dL[H] [70 - 99]  Potassium, Serum4.0 mmol/L [3.5 - 5.0]  Sodium, Serum 141 mmol/L [135 - 146]  [facility]  12-06-24 @ 07:49  Blood Gas Arterial-Calcium,Ionized--  Blood Urea Nitrogen, Serum 23 mg/dL[H] [10 - 20]  Carbon Dioxide, Serum18 mmol/L [17 - 32]  Chloride, Gkgni894 mmol/L [98 - 110]  Creatinie, Serum0.9 mg/dL [0.7 - 1.5]  Glucose, Serum92 mg/dL [70 - 99]  Potassium, Serum3.9 mmol/L [3.5 - 5.0]  Sodium, Serum 138 mmol/L [135 - 146]  [facility]  12-05-24 @ 07:54  Blood Gas Arterial-Calcium,Ionized--  Blood Urea Nitrogen, Serum 17 mg/dL [10 - 20]  Carbon Dioxide, Serum17 mmol/L [17 - 32]  Chloride, Wfgvq957 mmol/L [98 - 110]  Creatinie, Serum1.0 mg/dL [0.7 - 1.5]  Glucose, Serum93 mg/dL [70 - 99]  Potassium, Serum3.8 mmol/L [3.5 - 5.0]  Sodium, Serum 140 mmol/L [135 - 146]        Microbiology:    Culture - Stool (collected 11-23-24 @ 14:11)  Source: .Stool  Final Report (11-25-24 @ 22:02):    No enteric pathogens isolated.    (Stool culture examined for Salmonella,    Shigella, Campylobacter, Aeromonas, Plesiomonas,    Vibrio, E.coli O157 and Yersinia)    Culture - Fungal, Bronchial (collected 11-20-24 @ 12:00)  Source: Bronchial  Preliminary Report (12-04-24 @ 23:01):    No fungus isolated at 2 weeks.    Culture - Acid Fast - Bronchial w/Smear (collected 11-20-24 @ 12:00)  Source: Bronchial  Preliminary Report (12-04-24 @ 23:05):    No acid-fast bacilli isolated at 2 weeks.    Culture - Bronchial (collected 11-20-24 @ 12:00)  Source: Bronchial  Gram Stain (11-20-24 @ 23:18):    Moderate polymorphonuclear leukocytes per low power field    No squamous epithelial cells    Few Gram Positive Cocci in Clusters per oil power field  Final Report (11-22-24 @ 09:09):    Commensal rosalva consistent with body site    Culture - Fungal, Bronchial (collected 11-19-24 @ 11:25)  Source: Bronchial  Preliminary Report (12-04-24 @ 23:01):    No fungus isolated at 2 weeks.    Culture - Acid Fast - Bronchial w/Smear (collected 11-19-24 @ 11:25)  Source: Bronchial  Preliminary Report (12-04-24 @ 23:05):    No acid-fast bacilli isolated at 2 weeks.    Culture - Bronchial (collected 11-19-24 @ 11:25)  Source: Bronch Wash  Gram Stain (11-19-24 @ 23:26):    Rare polymorphonuclear leukocytes per low power field    No Squamous epithelial cells per low power field    No organisms seen per oil power field  Final Report (11-21-24 @ 08:25):    Commensal rosalva consistent with body site    Culture - Viral, General (collected 11-12-24 @ 09:01)    Culture - Fungal, Bronchial (collected 11-12-24 @ 09:01)  Source: Bronchial  Preliminary Report (12-04-24 @ 23:00):    No fungus isolated at 3 weeks.    Culture - Acid Fast - Bronchial w/Smear (collected 11-12-24 @ 09:01)  Source: Bronch Wash  Preliminary Report (12-04-24 @ 23:04):    No acid-fast bacilli isolated at 3 weeks.    Culture - Bronchial (collected 11-12-24 @ 09:01)  Source: Bronch Wash  Gram Stain (11-12-24 @ 20:33):    No Squamous epithelial cells seen per low power field    Rare polymorphonuclear leukocytes seen per low power field    No organisms seen per oil power field  Final Report (11-14-24 @ 09:38):    Commensal rosalva consistent with body site    Culture - Sputum (collected 11-08-24 @ 12:36)  Source: Trach Asp Tracheal Aspirate  Gram Stain (11-09-24 @ 07:34):    Few polymorphonuclear leukocytes per low power field    No Squamous epithelial cells per low power field    Rare Gram positive cocci in pairs seen per oil power field    Rare Gram Negative Rods seen per oil power field  Final Report (11-10-24 @ 09:45):    No growth        Medications:  acetaminophen     Tablet .. 650 milliGRAM(s) Oral every 6 hours PRN  albuterol    90 MICROgram(s) HFA Inhaler 2 Puff(s) Inhalation every 6 hours PRN  chlorhexidine 0.12% Liquid 15 milliLiter(s) Oral Mucosa every 12 hours  chlorhexidine 2% Cloths 1 Application(s) Topical <User Schedule>  enoxaparin Injectable 90 milliGRAM(s) SubCutaneous every 12 hours  ipratropium 17 MICROgram(s) HFA Inhaler 2 Puff(s) Inhalation every 6 hours PRN  loperamide 2 milliGRAM(s) Oral every 6 hours PRN  losartan 50 milliGRAM(s) Oral daily  nystatin Cream 1 Application(s) Topical two times a day  pantoprazole   Suspension 40 milliGRAM(s) Oral two times a day  potassium chloride   Powder 20 milliEquivalent(s) Oral once  vitamin A & D Ointment 1 Application(s) Topical two times a day        Assessment and Plan:  73-year-old female PMH A-fib on xarelto, HTN, s/p cholecystectomy, congenital solitary kidney presents from NH to the ED for evaluation of weakness, decreased PO intake and abdominal distension.     Admitted to ICU for septic shock requiring pressors 2/2 ESBL proteus (UTI vs. GI source) complicated by gastric distension w/ pneumatosis, NOLA 2/2 ATN, hypernatremia. s/p NGT decompression (-3.1 L feculent output) in ED. Intubated 11/6 for AHRF. EGD 11/8 w/ multiple ulcers, largest 10cm, path +gastritis. Now with new R colon/cecal mass pending colonoscopy when more stable.   Extubated 11/13 to AVAPs, weaned to HFNC however transitioned back to BiPAP after desaturation. Patient was weak and unable to clear secretions despite non-invasive measures. Reintubated 11/18 PM, s/p trach/PEJ 11/19 with thoracic sx.    # Norovirus Diarrhea- GI PCR on 11/23 and 1/24- positive x 2 , GI PCR ,  on contact isolation , supportive tx.  - stool cdiff on 11/23 neg  12/7 - no diarrhea, reported,  monitor for BM's      #AHRF 2/2 septic shock due to ESBL proteus (UTI Vs. GI source) s/p intubation x2, s/p trach 11/19  #HAGMA 2/2 lactic acidosis and uremia - resolved   #NOLA likely ATN 2/2 septic shock - resolved   #Possible aspiration  #Congenital solitary kidney  - Intubated 11/6, extubated 11/13 to AVAPS, re-intubated 11/18, s/p trach 11/19  - UCx +ESBL proteus, BCX (-), trach culture with resp rosalva, sputum cx 11/12 resp rosalva  - off pressors, off sedation  - s/p abx (finished 11/13)  - c/w mechanical ventilation, SBT as tolerated   - pain management,  chest PT/MDI q6h  - post bronch on 11/20- neg , f/u bronch cx/gram stain (11/18-neg , 11/19- neg )      #Dysphagia 2/2 critical illness s/p PEJ  - PEJ w/ thoracic 11/19- tolerating feedings well  J port clogged (11/29). Try Clog Zapper. Or call Thoracic Surgery.    #R colon/cecal mass on CT A/P  #Multiple gastric ulcers iso chronic gastritis   #Abdominal distension w/ pneumatosis - resolved   #Diarrhea - recurrent on 11/23- obtained GI PCR  - CT A/P on admission with severe gastric distension with new pneumatosis along posterior gastric wall suspicious for ischemia, foci of free intraperitoneal air ; suspicious portal venous gas; thick bladder wall (cystitis vs colovesicular fistula)   - s/p EGD 11/8 with multiple ulcers, erosive gastritis:  EGD path: chronic gastritis (-) H pylori, no malignancy   - repeated CT A/P with PO contrast 11.8 w/ suspicious hepatic flexure narrowing and lobulated soft tissue density in R colon/cecum.   -  Cdiff (-)  -  PPI BID   - colonoscopy when more stable as per GI    #Hypernatremia- corrected . free water via peg, close  BMP  # Hypomagnesemia -corrected     #Sacral DTI - offloading, wound care, pressure injury prevention    #Afib-  on therapeutic lovenox tx.    #HTN - soft b/p- d/c amlodipine 10mg   - continue  losartan 50mg via peg once daily     #TRacheostomy bleeding: noted on 11/30, s/p surgicel by surgery. Stopped as of 12/1. Increased lovenox back from prophylactic to therapeutic dose.      DVT ppx:  on lovenox tx.    Diet: NPO w/ PEJ feeding  GI ppx/Bowel regimen: PPI BID    Code status: full code      #Progress Note Handoff: d/c Amlodipine to avoid symptomatic hypotension, monitor for resolution of diarrhea,  electrolytes supplement PRN,  vent management as per Pulm. team, start d/c to SNF planning   #Family Discussion: yes, medical team     Disposition: SNF in 24 hours     Total time spent to complete patient's bedside assessment, review medical chart, discuss medical plan of care with covering medical team was more than 35 minutes with >50% of time spent face to face with patient, discussion with patient/family and/or coordination of care .

## 2024-12-07 NOTE — CHART NOTE - NSCHARTNOTEFT_GEN_A_CORE
Messaged by RN as pt saying his stomach hurt from peptamen 1.5 and is requesting peptamen 1.2.   Attempt to change the diet but the only order avilable to me is the peptamen AF which nutrition services in note explicitly states not to give.    Will forward to AM team

## 2024-12-07 NOTE — DISCHARGE NOTE PROVIDER - NSDCCPCAREPLAN_GEN_ALL_CORE_FT
PRINCIPAL DISCHARGE DIAGNOSIS  Diagnosis: Sepsis due to urinary tract infection  Assessment and Plan of Treatment: You came to the hospital with symptoms of weakness, decreased appetite, and abdominal distension. You were found to have septic shock likely due to an ESBL Proteus infection, along with severe gastric distension and other complications. We treated you with antibiotics, fluid resuscitation, vasopressors, and performed gastric decompression. You also required intubation and later received a tracheostomy and PEJ tube for airway and nutritional support. Over your stay, we addressed your recurrent diarrhea, electrolyte imbalances, and provided comprehensive care to stabilize your condition. Please ensure to follow up with your primary care provider and a gastroenterologist for a colonoscopy.      SECONDARY DISCHARGE DIAGNOSES  Diagnosis: NOLA (acute kidney injury)  Assessment and Plan of Treatment:      PRINCIPAL DISCHARGE DIAGNOSIS  Diagnosis: Sepsis due to urinary tract infection  Assessment and Plan of Treatment: You came to the hospital with symptoms of weakness, decreased appetite, and abdominal distension. You were found to have septic shock likely due to an ESBL Proteus infection, along with severe gastric distension and other complications. We treated you with antibiotics, vasopressors, and performed gastric decompression. You also required intubation and later received a tracheostomy and PEJ tube for airway and nutritional support. Over your stay, we addressed your recurrent diarrhea, electrolyte imbalances, and provided comprehensive care to stabilize your condition. Please ensure to follow up with your primary care provider and a gastroenterologist for a Endoscopy to be repeated after January 8th, 2025 to assess for healing of Gastric Ulcers and complete diagostic Colonoscopy.  Please continue daily Wean off trials at Nursing Home with Pulmonary team . PT/OT therapy as tolerated.   If you develop fevers, chest pain, shortness of breath , worsening diarrhea, abdominal pain, pain during urination please return the Emergency room for reevation  and therapy.      SECONDARY DISCHARGE DIAGNOSES  Diagnosis: NOLA (acute kidney injury)  Assessment and Plan of Treatment:      PRINCIPAL DISCHARGE DIAGNOSIS  Diagnosis: Sepsis due to urinary tract infection  Assessment and Plan of Treatment: You came to the hospital with symptoms of weakness, decreased appetite, and abdominal distension. You were found to have septic shock likely due to an ESBL Proteus infection, along with severe gastric distension and other complications. We treated you with antibiotics, vasopressors, and performed gastric decompression. You also required intubation and later received a tracheostomy and PEJ tube for airway and nutritional support. Over your stay, we addressed your recurrent diarrhea, electrolyte imbalances, and provided comprehensive care to stabilize your condition. Please ensure to follow up with your primary care provider and a gastroenterologist for a Endoscopy to be repeated after January 8th, 2025 to assess for healing of Gastric Ulcers and complete diagostic Colonoscopy.  Please continue daily Wean off trials at Nursing Home with Pulmonary team . PT/OT therapy as tolerated.   If you develop fevers, chest pain, shortness of breath , worsening diarrhea, abdominal pain, pain during urination please return the Emergency room for reevation  and therapy.  Follow up with BMP labs in 1 week with your PCP      SECONDARY DISCHARGE DIAGNOSES  Diagnosis: NOLA (acute kidney injury)  Assessment and Plan of Treatment:

## 2024-12-07 NOTE — PROGRESS NOTE ADULT - SUBJECTIVE AND OBJECTIVE BOX
Patient is a 74y old  Female who presents with a chief complaint of respiratory failure (06 Dec 2024 13:18)        Over Night Events:    No acute events  Stable on mechanical ventilation  afebrile      PHYSICAL EXAM    ICU Vital Signs Last 24 Hrs  T(C): 36.4 (07 Dec 2024 05:09), Max: 36.7 (06 Dec 2024 12:51)  T(F): 97.6 (07 Dec 2024 05:09), Max: 98.1 (06 Dec 2024 12:51)  HR: 88 (07 Dec 2024 05:09) (70 - 88)  BP: 104/69 (07 Dec 2024 05:09) (104/69 - 110/70)  BP(mean): --  ABP: --  ABP(mean): --  RR: 18 (07 Dec 2024 05:09) (18 - 20)  SpO2: 99% (07 Dec 2024 05:09) (99% - 100%)    O2 Parameters below as of 07 Dec 2024 05:09  Patient On (Oxygen Delivery Method): ventilator          General: ill looking  HEENT: trach  Lungs: dec bs   Cardiovascular: Regular   Abdomen: Soft  Neuro: not following commands      12-06-24 @ 07:01  -  12-07-24 @ 07:00  --------------------------------------------------------  IN:    Enteral Tube Flush: 90 mL    Peptamen A.F.: 835 mL  Total IN: 925 mL    OUT:    Voided (mL): 700 mL  Total OUT: 700 mL    Total NET: 225 mL          LABS:                            8.4    8.85  )-----------( 189      ( 06 Dec 2024 07:49 )             26.0                                               12-06    138  |  109  |  23[H]  ----------------------------<  92  3.9   |  18  |  0.9    Ca    10.5      06 Dec 2024 07:49  Mg     2.1     12-06    TPro  5.6[L]  /  Alb  3.0[L]  /  TBili  0.7  /  DBili  x   /  AST  11  /  ALT  8   /  AlkPhos  85  12-06                                             Urinalysis Basic - ( 06 Dec 2024 07:49 )    Color: x / Appearance: x / SG: x / pH: x  Gluc: 92 mg/dL / Ketone: x  / Bili: x / Urobili: x   Blood: x / Protein: x / Nitrite: x   Leuk Esterase: x / RBC: x / WBC x   Sq Epi: x / Non Sq Epi: x / Bacteria: x                                                  LIVER FUNCTIONS - ( 06 Dec 2024 07:49 )  Alb: 3.0 g/dL / Pro: 5.6 g/dL / ALK PHOS: 85 U/L / ALT: 8 U/L / AST: 11 U/L / GGT: x                                                                                               Mode: AC/ CMV (Assist Control/ Continuous Mandatory Ventilation)  RR (machine): 14  TV (machine): 400  FiO2: 40  PEEP: 8  ITime: 0.8  MAP: 15  PIP: 20                                          MEDICATIONS  (STANDING):  amLODIPine   Tablet 10 milliGRAM(s) Oral daily  chlorhexidine 0.12% Liquid 15 milliLiter(s) Oral Mucosa every 12 hours  chlorhexidine 2% Cloths 1 Application(s) Topical <User Schedule>  dextrose 5%. 1000 milliLiter(s) (100 mL/Hr) IV Continuous <Continuous>  enoxaparin Injectable 90 milliGRAM(s) SubCutaneous every 12 hours  losartan 50 milliGRAM(s) Oral daily  nystatin Cream 1 Application(s) Topical two times a day  pantoprazole   Suspension 40 milliGRAM(s) Oral two times a day  potassium chloride   Powder 20 milliEquivalent(s) Oral once  vitamin A & D Ointment 1 Application(s) Topical two times a day    MEDICATIONS  (PRN):  acetaminophen     Tablet .. 650 milliGRAM(s) Oral every 6 hours PRN Temp greater or equal to 38C (100.4F)  albuterol    90 MICROgram(s) HFA Inhaler 2 Puff(s) Inhalation every 6 hours PRN Shortness of Breath and/or Wheezing  ipratropium 17 MICROgram(s) HFA Inhaler 2 Puff(s) Inhalation every 6 hours PRN SOb  loperamide 2 milliGRAM(s) Oral every 6 hours PRN Diarrhea

## 2024-12-07 NOTE — DISCHARGE NOTE PROVIDER - NSDCMRMEDTOKEN_GEN_ALL_CORE_FT
acetaminophen 325 mg oral tablet: 2 tab(s) orally every 6 hours, As Needed - for fever, for moderate pain  alendronate 70 mg oral tablet: 1 tab(s) orally once a day  ascorbic acid 500 mg oral tablet: 1 tab(s) orally once a day  cholecalciferol oral tablet: 400 unit(s) orally once a day  Cozaar 100 mg oral tablet: 1 tab(s) orally once a day  cyclobenzaprine 10 mg oral tablet: 1 tab(s) orally once a day  famotidine 20 mg oral tablet: 1 tab(s) orally once a day  ibuprofen 400 mg oral tablet: 1 tab(s) orally once a day as needed for  moderate pain  labetalol 100 mg oral tablet: 1 tab(s) orally 3 times a day  loperamide 2 mg oral capsule: 1 cap(s) orally every 12 hours  ADMINISTER BEFORE MEALS  STOP AFTER 2 DOSES  magnesium hydroxide 400 mg oral tablet, chewable: orally once a day  Nifedical XL 60 mg oral tablet, extended release: 1 tab(s) orally once a day  Percocet 10 mg-325 mg oral tablet: 1 tab(s) orally every 8 hours as needed for  severe pain  polyethylene glycol 3350 oral powder for reconstitution: 17 gram(s) orally once a day  rivaroxaban 15 mg oral tablet: 1 tab(s) orally once a day  saccharomyces boulardii lyo 250 mg oral capsule: 1 cap(s) orally 2 times a day   acetaminophen 325 mg oral tablet: 2 tab(s) orally every 6 hours, As Needed - for fever, for moderate pain  albuterol 90 mcg/inh inhalation aerosol: 2 puff(s) inhaled every 6 hours As needed Shortness of Breath and/or Wheezing  alendronate 70 mg oral tablet: 1 tab(s) orally once a week  cholecalciferol oral tablet: 400 unit(s) orally once a day  Cozaar 100 mg oral tablet: 1 tab(s) orally once a day  ipratropium 17 mcg/inh inhalation aerosol: 2 puff(s) inhaled every 6 hours As needed SOb  loperamide 2 mg oral capsule: 1 cap(s) orally every 12 hours as needed for  diarrhea stop after 7 days  magnesium hydroxide 400 mg oral tablet, chewable: orally once a day  nystatin 100,000 units/g topical cream: 1 Apply topically to affected area 2 times a day  pantoprazole 40 mg oral granule, delayed release: 40 milligram(s) by jejunostomy tube 2 times a day  rivaroxaban 15 mg oral tablet: 1 tab(s) orally once a day  vitamin A and D topical ointment: 1 Apply topically to affected area 2 times a day   acetaminophen 325 mg oral tablet: 2 tab(s) orally every 6 hours, As Needed - for fever, for moderate pain  albuterol 90 mcg/inh inhalation aerosol: 2 puff(s) inhaled every 6 hours As needed Shortness of Breath and/or Wheezing  alendronate 70 mg oral tablet: 1 tab(s) orally once a week  cholecalciferol oral tablet: 400 unit(s) orally once a day  Cozaar 100 mg oral tablet: 0.5 tab(s) orally once a day  ipratropium 17 mcg/inh inhalation aerosol: 2 puff(s) inhaled every 6 hours As needed SOb  loperamide 2 mg oral capsule: 1 cap(s) orally every 12 hours as needed for  diarrhea stop after 7 days  magnesium hydroxide 400 mg oral tablet, chewable: orally once a day  Multiple Vitamins with Minerals oral liquid: 10 milliliter(s) by jejunostomy tube once a day  nystatin 100,000 units/g topical cream: 1 Apply topically to affected area 2 times a day  pantoprazole 40 mg oral granule, delayed release: 40 milligram(s) by jejunostomy tube 2 times a day  rivaroxaban 15 mg oral tablet: 1 tab(s) orally once a day  vitamin A and D topical ointment: 1 Apply topically to affected area 2 times a day

## 2024-12-07 NOTE — DISCHARGE NOTE PROVIDER - CARE PROVIDER_API CALL
Christo Melgar De  Internal Medicine  [address] [place] [id number]  Phone: [phone]  Fax: [phone]  Follow Up Time:    Christo Melgar  Internal Medicine  [address] [place] [address]  Phone: [phone]  Fax: [phone]  Follow Up Time:     Hillary Montalvo  Internal Medicine  [address] [place] [id number]  Phone: [phone]  Fax: [phone]  Follow Up Time: 1 month    Cooper Jones  Pulmonary Disease  [address] [place] [id number]  Phone: [phone]  Fax: [phone]  Follow Up Time: 1 week

## 2024-12-07 NOTE — DISCHARGE NOTE PROVIDER - INSTRUCTIONS
Jevity 1.2 Jeff (JEVITY1.2RTH)  Total Volume for 24 Hours (mL): 1350  Continuous  Starting Tube Feed Rate {mL per Hour}: 25  Until Goal Tube Feed Rate (mL per Hour): 75  Tube Feed Duration (in Hours): 18  Tube Feed Start Time: 12:00  Free Water Flush Instructions:  100 mL q4hrs; Please flush with 30 mL pre/post no carb prosource  No Carb Prosource (1pkg = 15gms Protein)     Qty per Day:  2  Banatrol TF     Qty per Day:  4 per day

## 2024-12-07 NOTE — DISCHARGE NOTE PROVIDER - PROVIDER TOKENS
PROVIDER:[TOKEN:[[id number]]] PROVIDER:[TOKEN:[[id number]]],PROVIDER:[TOKEN:[[id number]],FOLLOWUP:[1 month]],PROVIDER:[TOKEN:[[id number]],FOLLOWUP:[1 week]]

## 2024-12-07 NOTE — CHART NOTE - NSCHARTNOTESELECT_GEN_ALL_CORE
EGD/Event Note
Event Note
Event Note
Follow Up/Nutrition Services
Follow Up/Nutrition Services
Pall Care On Call
Post op check
Thoracic Surgery/Event Note
Transfer Note
Consult for TF recommendations/Nutrition Services
ENT/Event Note
Event Note
Event Note
Follow Up/Nutrition Services
Nutrition/Event Note
Thoracic Surgery Recall - Trach Bleed
Vascular surgery - addendum/Event Note

## 2024-12-08 LAB
ALBUMIN SERPL ELPH-MCNC: 3.3 G/DL — LOW (ref 3.5–5.2)
ALP SERPL-CCNC: 82 U/L — SIGNIFICANT CHANGE UP (ref 30–115)
ALT FLD-CCNC: 7 U/L — SIGNIFICANT CHANGE UP (ref 0–41)
ANION GAP SERPL CALC-SCNC: 10 MMOL/L — SIGNIFICANT CHANGE UP (ref 7–14)
AST SERPL-CCNC: 10 U/L — SIGNIFICANT CHANGE UP (ref 0–41)
BILIRUB SERPL-MCNC: 0.9 MG/DL — SIGNIFICANT CHANGE UP (ref 0.2–1.2)
BUN SERPL-MCNC: 17 MG/DL — SIGNIFICANT CHANGE UP (ref 10–20)
CALCIUM SERPL-MCNC: 10.6 MG/DL — HIGH (ref 8.4–10.5)
CHLORIDE SERPL-SCNC: 110 MMOL/L — SIGNIFICANT CHANGE UP (ref 98–110)
CO2 SERPL-SCNC: 19 MMOL/L — SIGNIFICANT CHANGE UP (ref 17–32)
CREAT SERPL-MCNC: 0.7 MG/DL — SIGNIFICANT CHANGE UP (ref 0.7–1.5)
EGFR: 92 ML/MIN/1.73M2 — SIGNIFICANT CHANGE UP
GLUCOSE BLDC GLUCOMTR-MCNC: 118 MG/DL — HIGH (ref 70–99)
GLUCOSE BLDC GLUCOMTR-MCNC: 118 MG/DL — HIGH (ref 70–99)
GLUCOSE BLDC GLUCOMTR-MCNC: 81 MG/DL — SIGNIFICANT CHANGE UP (ref 70–99)
GLUCOSE BLDC GLUCOMTR-MCNC: 91 MG/DL — SIGNIFICANT CHANGE UP (ref 70–99)
GLUCOSE SERPL-MCNC: 86 MG/DL — SIGNIFICANT CHANGE UP (ref 70–99)
MAGNESIUM SERPL-MCNC: 1.6 MG/DL — LOW (ref 1.8–2.4)
POTASSIUM SERPL-MCNC: 3.7 MMOL/L — SIGNIFICANT CHANGE UP (ref 3.5–5)
POTASSIUM SERPL-SCNC: 3.7 MMOL/L — SIGNIFICANT CHANGE UP (ref 3.5–5)
PROT SERPL-MCNC: 5.4 G/DL — LOW (ref 6–8)
SODIUM SERPL-SCNC: 139 MMOL/L — SIGNIFICANT CHANGE UP (ref 135–146)

## 2024-12-08 PROCEDURE — 99232 SBSQ HOSP IP/OBS MODERATE 35: CPT

## 2024-12-08 RX ADMIN — NYSTATIN 1 APPLICATION(S): 100000 POWDER TOPICAL at 05:16

## 2024-12-08 RX ADMIN — ENOXAPARIN SODIUM 90 MILLIGRAM(S): 30 INJECTION SUBCUTANEOUS at 17:22

## 2024-12-08 RX ADMIN — CHLORHEXIDINE GLUCONATE 15 MILLILITER(S): 1.2 RINSE ORAL at 05:16

## 2024-12-08 RX ADMIN — PANTOPRAZOLE SODIUM 40 MILLIGRAM(S): 40 TABLET, DELAYED RELEASE ORAL at 17:22

## 2024-12-08 RX ADMIN — CHLORHEXIDINE GLUCONATE 1 APPLICATION(S): 1.2 RINSE ORAL at 05:17

## 2024-12-08 RX ADMIN — LOSARTAN POTASSIUM 50 MILLIGRAM(S): 100 TABLET, FILM COATED ORAL at 05:16

## 2024-12-08 RX ADMIN — PANTOPRAZOLE SODIUM 40 MILLIGRAM(S): 40 TABLET, DELAYED RELEASE ORAL at 05:16

## 2024-12-08 RX ADMIN — CHLORHEXIDINE GLUCONATE 15 MILLILITER(S): 1.2 RINSE ORAL at 17:22

## 2024-12-08 RX ADMIN — POTASSIUM CHLORIDE 20 MILLIEQUIVALENT(S): 600 TABLET, EXTENDED RELEASE ORAL at 17:23

## 2024-12-08 RX ADMIN — NYSTATIN 1 APPLICATION(S): 100000 POWDER TOPICAL at 17:22

## 2024-12-08 RX ADMIN — Medication 1 APPLICATION(S): at 05:16

## 2024-12-08 RX ADMIN — Medication 1 APPLICATION(S): at 17:22

## 2024-12-08 RX ADMIN — ENOXAPARIN SODIUM 90 MILLIGRAM(S): 30 INJECTION SUBCUTANEOUS at 05:17

## 2024-12-08 NOTE — PROGRESS NOTE ADULT - SUBJECTIVE AND OBJECTIVE BOX
Patient is a 74y old  Female who presents with a chief complaint of respiratory failure (07 Dec 2024 15:07)        Over Night Events:    Stable on MV at time of my exam  Had some stomach pain overnight  No acute events      PHYSICAL EXAM    ICU Vital Signs Last 24 Hrs  T(C): 36.7 (07 Dec 2024 20:22), Max: 36.7 (07 Dec 2024 20:22)  T(F): 98.1 (07 Dec 2024 20:22), Max: 98.1 (07 Dec 2024 20:22)  HR: 66 (08 Dec 2024 05:37) (60 - 71)  BP: 137/79 (08 Dec 2024 05:37) (100/66 - 137/79)  BP(mean): --  ABP: --  ABP(mean): --  RR: 18 (07 Dec 2024 12:00) (18 - 18)  SpO2: 99% (08 Dec 2024 05:37) (98% - 100%)      General: ill looking  HEENT: trach  Lungs: dec bs   Cardiovascular: Regular   Abdomen: Soft  Neuro: following commands      12-07-24 @ 07:01  -  12-08-24 @ 07:00  --------------------------------------------------------  IN:    Enteral Tube Flush: 120 mL  Total IN: 120 mL    OUT:  Total OUT: 0 mL    Total NET: 120 mL          LABS:                            8.2    10.75 )-----------( 218      ( 07 Dec 2024 07:59 )             26.0                                               12-07    141  |  111[H]  |  21[H]  ----------------------------<  102[H]  4.0   |  22  |  0.9    Ca    10.5      07 Dec 2024 07:59  Mg     1.8     12-07    TPro  5.1[L]  /  Alb  3.1[L]  /  TBili  0.7  /  DBili  x   /  AST  8   /  ALT  7   /  AlkPhos  74  12-07                                             Urinalysis Basic - ( 07 Dec 2024 07:59 )    Color: x / Appearance: x / SG: x / pH: x  Gluc: 102 mg/dL / Ketone: x  / Bili: x / Urobili: x   Blood: x / Protein: x / Nitrite: x   Leuk Esterase: x / RBC: x / WBC x   Sq Epi: x / Non Sq Epi: x / Bacteria: x                                                  LIVER FUNCTIONS - ( 07 Dec 2024 07:59 )  Alb: 3.1 g/dL / Pro: 5.1 g/dL / ALK PHOS: 74 U/L / ALT: 7 U/L / AST: 8 U/L / GGT: x                                                                                               Mode: CPAP with PS  RR (machine):   TV (machine):   FiO2: 40  PEEP: 8  PS: 12  ITime: 0.8  MAP: 8  PIP: 18                                          MEDICATIONS  (STANDING):  chlorhexidine 0.12% Liquid 15 milliLiter(s) Oral Mucosa every 12 hours  chlorhexidine 2% Cloths 1 Application(s) Topical <User Schedule>  enoxaparin Injectable 90 milliGRAM(s) SubCutaneous every 12 hours  losartan 50 milliGRAM(s) Oral daily  nystatin Cream 1 Application(s) Topical two times a day  pantoprazole   Suspension 40 milliGRAM(s) Oral two times a day  potassium chloride   Powder 20 milliEquivalent(s) Oral once  vitamin A & D Ointment 1 Application(s) Topical two times a day    MEDICATIONS  (PRN):  acetaminophen     Tablet .. 650 milliGRAM(s) Oral every 6 hours PRN Temp greater or equal to 38C (100.4F)  albuterol    90 MICROgram(s) HFA Inhaler 2 Puff(s) Inhalation every 6 hours PRN Shortness of Breath and/or Wheezing  ipratropium 17 MICROgram(s) HFA Inhaler 2 Puff(s) Inhalation every 6 hours PRN SOb  loperamide 2 milliGRAM(s) Oral every 6 hours PRN Diarrhea

## 2024-12-08 NOTE — PROGRESS NOTE ADULT - SUBJECTIVE AND OBJECTIVE BOX
PATRICIA SPRAGUE  [facility]-N 3A 024 A (SI-N 3A)      Patient was evaluated and examined  by bedside, requested to be placed on specific J feedings, refusing to receive feedings in am , tolerating PS over night time       REVIEW OF SYSTEMS: unable to obtain, patient is forgetful         T(C): , Max: 36.7 (12-07-24 @ 20:22)  HR: 71 (12-08-24 @ 13:33)  BP: 126/74 (12-08-24 @ 13:00)  RR: 20 (12-08-24 @ 13:00)  SpO2: 99% (12-08-24 @ 13:33)  CAPILLARY BLOOD GLUCOSE      POCT Blood Glucose.: 81 mg/dL (08 Dec 2024 11:26)  POCT Blood Glucose.: 91 mg/dL (08 Dec 2024 00:35)  POCT Blood Glucose.: 97 mg/dL (07 Dec 2024 17:38)      PHYSICAL EXAM:  General: NAD, AAOX3, patient is laying comfortably in bed  HEENT: AT, NC, Supple, trach present  Lungs: CTA B/L, no wheezing, no rhonchi  CVS: normal S1, S2, RRR, NO M/G/R  Abdomen: soft, bowel sounds present, non-tender, non-distended ,peg present   Extremities: no edema, no clubbing, no cyanosis, positive peripheral pulses b/l  Neuro: generalized body weakness  Skin: no rash, no ecchymosis          LAB  CBC  Date: 12-07-24 @ 07:59  Mean cell Rtjioftuqq51.7  Mean cell Hemoglobin Conc31.5  Mean cell Volum 94.2  Platelet count-Automate 218  RBC Count 2.76  Red Cell Distrib Width14.4  WBC Count10.75  % Albumin, Urine--  Hematocrit 26.0  Hemoglobin 8.2  CBC  Date: 12-06-24 @ 07:49  Mean cell Wnsdzatvdu15.0  Mean cell Hemoglobin Conc32.3  Mean cell Volum 92.9  Platelet count-Automate 189  RBC Count 2.80  Red Cell Distrib Width14.6  WBC Count8.85  % Albumin, Urine--  Hematocrit 26.0  Hemoglobin 8.4  CBC  Date: 12-05-24 @ 07:54  Mean cell Cbczjedosq46.3  Mean cell Hemoglobin Conc32.4  Mean cell Volum 93.4  Platelet count-Automate 206  RBC Count 2.71  Red Cell Distrib Width14.6  WBC Count8.40  % Albumin, Urine--  Hematocrit 25.3  Hemoglobin 8.2  CBC  Date: 12-04-24 @ 08:01  Mean cell Xrzuabmpze05.1  Mean cell Hemoglobin Conc32.2  Mean cell Volum 93.6  Platelet count-Automate 216  RBC Count 2.82  Red Cell Distrib Width14.4  WBC Count8.64  % Albumin, Urine--  Hematocrit 26.4  Hemoglobin 8.5  CBC  Date: 12-03-24 @ 07:38  Mean cell Ydqucpdptm83.3  Mean cell Hemoglobin Conc32.5  Mean cell Volum 93.1  Platelet count-Automate 208  RBC Count 2.74  Red Cell Distrib Width14.0  WBC Count9.22  % Albumin, Urine--  Hematocrit 25.5  Hemoglobin 8.3  CBC  Date: 12-02-24 @ 07:02  Mean cell Prfcjtcgjj05.0  Mean cell Hemoglobin Conc31.2  Mean cell Volum 96.3  Platelet count-Automate 197  RBC Count 2.73  Red Cell Distrib Width14.2  WBC Count8.90  % Albumin, Urine--  Hematocrit 26.3  Hemoglobin 8.2    [facility]  12-08-24 @ 07:29  Blood Gas Arterial-Calcium,Ionized--  Blood Urea Nitrogen, Serum 17 mg/dL [10 - 20]  Carbon Dioxide, Serum19 mmol/L [17 - 32]  Chloride, Jxylu241 mmol/L [98 - 110]  Creatinie, Serum0.7 mg/dL [0.7 - 1.5]  Glucose, Serum86 mg/dL [70 - 99]  Potassium, Serum3.7 mmol/L [3.5 - 5.0]  Sodium, Serum 139 mmol/L [135 - 146]  [facility]  12-07-24 @ 07:59  Blood Gas Arterial-Calcium,Ionized--  Blood Urea Nitrogen, Serum 21 mg/dL[H] [10 - 20]  Carbon Dioxide, Serum22 mmol/L [17 - 32]  Chloride, Uohmv416 mmol/L[H] [98 - 110]  Creatinie, Serum0.9 mg/dL [0.7 - 1.5]  Glucose, Jszhu993 mg/dL[H] [70 - 99]  Potassium, Serum4.0 mmol/L [3.5 - 5.0]  Sodium, Serum 141 mmol/L [135 - 146]  [facility]  12-06-24 @ 07:49  Blood Gas Arterial-Calcium,Ionized--  Blood Urea Nitrogen, Serum 23 mg/dL[H] [10 - 20]  Carbon Dioxide, Serum18 mmol/L [17 - 32]  Chloride, Umoza507 mmol/L [98 - 110]  Creatinie, Serum0.9 mg/dL [0.7 - 1.5]  Glucose, Serum92 mg/dL [70 - 99]  Potassium, Serum3.9 mmol/L [3.5 - 5.0]  Sodium, Serum 138 mmol/L [135 - 146]  BMP  12-05-24 @ 07:54  Blood Gas Arterial-Calcium,Ionized--  Blood Urea Nitrogen, Serum 17 mg/dL [10 - 20]  Carbon Dioxide, Serum17 mmol/L [17 - 32]  Chloride, Qydgc511 mmol/L [98 - 110]  Creatinie, Serum1.0 mg/dL [0.7 - 1.5]  Glucose, Serum93 mg/dL [70 - 99]  Potassium, Serum3.8 mmol/L [3.5 - 5.0]  Sodium, Serum 140 mmol/L [135 - 146]  BMP  12-04-24 @ 08:01  Blood Gas Arterial-Calcium,Ionized--  Blood Urea Nitrogen, Serum 12 mg/dL [10 - 20]  Carbon Dioxide, Serum18 mmol/L [17 - 32]  Chloride, Dtmqt098 mmol/L[H] [98 - 110]  Creatinie, Serum0.8 mg/dL [0.7 - 1.5]  Glucose, Serum90 mg/dL [70 - 99]  Potassium, Serum3.6 mmol/L [3.5 - 5.0]  Sodium, Serum 140 mmol/L [135 - 146]              Microbiology:    Culture - Stool (collected 11-23-24 @ 14:11)  Source: .Stool  Final Report (11-25-24 @ 22:02):    No enteric pathogens isolated.    (Stool culture examined for Salmonella,    Shigella, Campylobacter, Aeromonas, Plesiomonas,    Vibrio, E.coli O157 and Yersinia)    Culture - Fungal, Bronchial (collected 11-20-24 @ 12:00)  Source: Bronchial  Preliminary Report (12-04-24 @ 23:01):    No fungus isolated at 2 weeks.    Culture - Acid Fast - Bronchial w/Smear (collected 11-20-24 @ 12:00)  Source: Bronchial  Preliminary Report (12-04-24 @ 23:05):    No acid-fast bacilli isolated at 2 weeks.    Culture - Bronchial (collected 11-20-24 @ 12:00)  Source: Bronchial  Gram Stain (11-20-24 @ 23:18):    Moderate polymorphonuclear leukocytes per low power field    No squamous epithelial cells    Few Gram Positive Cocci in Clusters per oil power field  Final Report (11-22-24 @ 09:09):    Commensal rosalva consistent with body site    Culture - Fungal, Bronchial (collected 11-19-24 @ 11:25)  Source: Bronchial  Preliminary Report (12-04-24 @ 23:01):    No fungus isolated at 2 weeks.    Culture - Acid Fast - Bronchial w/Smear (collected 11-19-24 @ 11:25)  Source: Bronchial  Preliminary Report (12-04-24 @ 23:05):    No acid-fast bacilli isolated at 2 weeks.    Culture - Bronchial (collected 11-19-24 @ 11:25)  Source: Bronch Wash  Gram Stain (11-19-24 @ 23:26):    Rare polymorphonuclear leukocytes per low power field    No Squamous epithelial cells per low power field    No organisms seen per oil power field  Final Report (11-21-24 @ 08:25):    Commensal rosalva consistent with body site    Culture - Viral, General (collected 11-12-24 @ 09:01)    Culture - Fungal, Bronchial (collected 11-12-24 @ 09:01)  Source: Bronchial  Preliminary Report (12-04-24 @ 23:00):    No fungus isolated at 3 weeks.    Culture - Acid Fast - Bronchial w/Smear (collected 11-12-24 @ 09:01)  Source: Bronch Wash  Preliminary Report (12-04-24 @ 23:04):    No acid-fast bacilli isolated at 3 weeks.    Culture - Bronchial (collected 11-12-24 @ 09:01)  Source: Bronch Wash  Gram Stain (11-12-24 @ 20:33):    No Squamous epithelial cells seen per low power field    Rare polymorphonuclear leukocytes seen per low power field    No organisms seen per oil power field  Final Report (11-14-24 @ 09:38):    Commensal rosalva consistent with body site        Medications:  acetaminophen     Tablet .. 650 milliGRAM(s) Oral every 6 hours PRN  albuterol    90 MICROgram(s) HFA Inhaler 2 Puff(s) Inhalation every 6 hours PRN  chlorhexidine 0.12% Liquid 15 milliLiter(s) Oral Mucosa every 12 hours  chlorhexidine 2% Cloths 1 Application(s) Topical <User Schedule>  enoxaparin Injectable 90 milliGRAM(s) SubCutaneous every 12 hours  ipratropium 17 MICROgram(s) HFA Inhaler 2 Puff(s) Inhalation every 6 hours PRN  loperamide 2 milliGRAM(s) Oral every 6 hours PRN  losartan 50 milliGRAM(s) Oral daily  nystatin Cream 1 Application(s) Topical two times a day  pantoprazole   Suspension 40 milliGRAM(s) Oral two times a day  potassium chloride   Powder 20 milliEquivalent(s) Oral once  vitamin A & D Ointment 1 Application(s) Topical two times a day        Assessment and Plan:  73-year-old female PMH A-fib on xarelto, HTN, s/p cholecystectomy, congenital solitary kidney presents from NH to the ED for evaluation of weakness, decreased PO intake and abdominal distension.     Admitted to ICU for septic shock requiring pressors 2/2 ESBL proteus (UTI vs. GI source) complicated by gastric distension w/ pneumatosis, NOLA 2/2 ATN, hypernatremia. s/p NGT decompression (-3.1 L feculent output) in ED. Intubated 11/6 for AHRF. EGD 11/8 w/ multiple ulcers, largest 10cm, path +gastritis. Now with new R colon/cecal mass pending colonoscopy when more stable.   Extubated 11/13 to AVAPs, weaned to HFNC however transitioned back to BiPAP after desaturation. Patient was weak and unable to clear secretions despite non-invasive measures. Reintubated 11/18 PM, s/p trach/PEJ 11/19 with thoracic sx.    # Norovirus Diarrhea- GI PCR on 11/23 and 1/24- positive x 2 , GI PCR ,  on contact isolation , supportive tx.  - stool cdiff on 11/23 neg  12/8 - no diarrhea, reported, patient initially refused J feedings, requested to change feedings formula, currently resumed on J feedings,  monitor for BM's      #AHRF 2/2 septic shock due to ESBL proteus (UTI Vs. GI source) s/p intubation x2, s/p trach 11/19  #HAGMA 2/2 lactic acidosis and uremia - resolved   #NOLA likely ATN 2/2 septic shock - resolved   #Possible aspiration  #Congenital solitary kidney  - Intubated 11/6, extubated 11/13 to AVAPS, re-intubated 11/18, s/p trach 11/19  - UCx +ESBL proteus, BCX (-), trach culture with resp rosalva, sputum cx 11/12 resp rosalva  - off pressors, off sedation  - s/p abx (finished 11/13)  - c/w mechanical ventilation, SBT as tolerated   - pain management,  chest PT/MDI q6h  - post bronch on 11/20- neg , f/u bronch cx/gram stain (11/18-neg , 11/19- neg )      #Dysphagia 2/2 critical illness s/p PEJ  - PEJ w/ thoracic 11/19- tolerating feedings well  J port clogged (11/29). Try Clog Zapper. Or call Thoracic Surgery.    #R colon/cecal mass on CT A/P  #Multiple gastric ulcers iso chronic gastritis   #Abdominal distension w/ pneumatosis - resolved   #Diarrhea - recurrent on 11/23- obtained GI PCR  - CT A/P on admission with severe gastric distension with new pneumatosis along posterior gastric wall suspicious for ischemia, foci of free intraperitoneal air ; suspicious portal venous gas; thick bladder wall (cystitis vs colovesicular fistula)   - s/p EGD 11/8 with multiple ulcers, erosive gastritis:  EGD path: chronic gastritis (-) H pylori, no malignancy   - repeated CT A/P with PO contrast 11.8 w/ suspicious hepatic flexure narrowing and lobulated soft tissue density in R colon/cecum.   -  Cdiff (-)  -  PPI BID   - colonoscopy when more stable as per GI    #Hypernatremia- corrected . free water via peg, close  BMP  # Hypomagnesemia -corrected     #Sacral DTI - offloading, wound care, pressure injury prevention    #Afib-  on therapeutic lovenox tx.    #HTN -- continue  losartan 50mg via peg once daily     #TRacheostomy bleeding: noted on 11/30, s/p surgicel by surgery. Stopped as of 12/1. Increased lovenox back from prophylactic to therapeutic dose.      DVT ppx:  on lovenox tx.    Diet: NPO w/ PEJ feeding  GI ppx/Bowel regimen: PPI BID    Code status: full code      #Progress Note Handoff:  monitor for resolution of diarrhea,  electrolytes supplement PRN,  vent management as per Pulm. team, start d/c to SNF planning   #Family Discussion: yes, medical team     Disposition: SNF tomorrow.     Total time spent to complete patient's bedside assessment, review medical chart, discuss medical plan of care with covering medical team was more than 35 minutes with >50% of time spent face to face with patient, discussion with patient/family and/or coordination of care .

## 2024-12-09 ENCOUNTER — TRANSCRIPTION ENCOUNTER (OUTPATIENT)
Age: 74
End: 2024-12-09

## 2024-12-09 VITALS — HEART RATE: 78 BPM | OXYGEN SATURATION: 98 %

## 2024-12-09 LAB
ALBUMIN SERPL ELPH-MCNC: 3.1 G/DL — LOW (ref 3.5–5.2)
ALP SERPL-CCNC: 80 U/L — SIGNIFICANT CHANGE UP (ref 30–115)
ALT FLD-CCNC: 7 U/L — SIGNIFICANT CHANGE UP (ref 0–41)
ANION GAP SERPL CALC-SCNC: 11 MMOL/L — SIGNIFICANT CHANGE UP (ref 7–14)
AST SERPL-CCNC: 14 U/L — SIGNIFICANT CHANGE UP (ref 0–41)
BILIRUB SERPL-MCNC: 0.6 MG/DL — SIGNIFICANT CHANGE UP (ref 0.2–1.2)
BUN SERPL-MCNC: 23 MG/DL — HIGH (ref 10–20)
CALCIUM SERPL-MCNC: 10.2 MG/DL — SIGNIFICANT CHANGE UP (ref 8.4–10.5)
CHLORIDE SERPL-SCNC: 108 MMOL/L — SIGNIFICANT CHANGE UP (ref 98–110)
CO2 SERPL-SCNC: 18 MMOL/L — SIGNIFICANT CHANGE UP (ref 17–32)
CREAT SERPL-MCNC: 1 MG/DL — SIGNIFICANT CHANGE UP (ref 0.7–1.5)
EGFR: 60 ML/MIN/1.73M2 — SIGNIFICANT CHANGE UP
GLUCOSE BLDC GLUCOMTR-MCNC: 106 MG/DL — HIGH (ref 70–99)
GLUCOSE BLDC GLUCOMTR-MCNC: 118 MG/DL — HIGH (ref 70–99)
GLUCOSE BLDC GLUCOMTR-MCNC: 98 MG/DL — SIGNIFICANT CHANGE UP (ref 70–99)
GLUCOSE SERPL-MCNC: 83 MG/DL — SIGNIFICANT CHANGE UP (ref 70–99)
HCT VFR BLD CALC: 25.9 % — LOW (ref 37–47)
HGB BLD-MCNC: 8.4 G/DL — LOW (ref 12–16)
MAGNESIUM SERPL-MCNC: 1.6 MG/DL — LOW (ref 1.8–2.4)
MCHC RBC-ENTMCNC: 30 PG — SIGNIFICANT CHANGE UP (ref 27–31)
MCHC RBC-ENTMCNC: 32.4 G/DL — SIGNIFICANT CHANGE UP (ref 32–37)
MCV RBC AUTO: 92.5 FL — SIGNIFICANT CHANGE UP (ref 81–99)
NRBC # BLD: 0 /100 WBCS — SIGNIFICANT CHANGE UP (ref 0–0)
PLATELET # BLD AUTO: 189 K/UL — SIGNIFICANT CHANGE UP (ref 130–400)
PMV BLD: 11.7 FL — HIGH (ref 7.4–10.4)
POTASSIUM SERPL-MCNC: 3.9 MMOL/L — SIGNIFICANT CHANGE UP (ref 3.5–5)
POTASSIUM SERPL-SCNC: 3.9 MMOL/L — SIGNIFICANT CHANGE UP (ref 3.5–5)
PROT SERPL-MCNC: 5.3 G/DL — LOW (ref 6–8)
RBC # BLD: 2.8 M/UL — LOW (ref 4.2–5.4)
RBC # FLD: 14.5 % — SIGNIFICANT CHANGE UP (ref 11.5–14.5)
SODIUM SERPL-SCNC: 137 MMOL/L — SIGNIFICANT CHANGE UP (ref 135–146)
WBC # BLD: 8.21 K/UL — SIGNIFICANT CHANGE UP (ref 4.8–10.8)
WBC # FLD AUTO: 8.21 K/UL — SIGNIFICANT CHANGE UP (ref 4.8–10.8)
ZINC SERPL-MCNC: 75 UG/DL — SIGNIFICANT CHANGE UP (ref 44–115)

## 2024-12-09 PROCEDURE — 99232 SBSQ HOSP IP/OBS MODERATE 35: CPT

## 2024-12-09 PROCEDURE — 99239 HOSP IP/OBS DSCHRG MGMT >30: CPT

## 2024-12-09 RX ORDER — CYCLOBENZAPRINE HCL 10 MG
1 TABLET ORAL
Refills: 0 | DISCHARGE

## 2024-12-09 RX ORDER — IBUPROFEN 200 MG
1 TABLET ORAL
Refills: 0 | DISCHARGE

## 2024-12-09 RX ORDER — LABETALOL 100 MG/1
1 TABLET, FILM COATED ORAL
Refills: 0 | DISCHARGE

## 2024-12-09 RX ORDER — NIFEDIPINE 10 MG
1 CAPSULE ORAL
Refills: 0 | DISCHARGE

## 2024-12-09 RX ORDER — VITAMIN A
1 CREAM (GRAM) TOPICAL
Qty: 0 | Refills: 0 | DISCHARGE
Start: 2024-12-09

## 2024-12-09 RX ORDER — FAMOTIDINE 20 MG/1
1 TABLET, FILM COATED ORAL
Refills: 0 | DISCHARGE

## 2024-12-09 RX ORDER — ALBUTEROL 90 MCG
2 AEROSOL (GRAM) INHALATION
Qty: 0 | Refills: 0 | DISCHARGE
Start: 2024-12-09

## 2024-12-09 RX ORDER — NYSTATIN 100000 [USP'U]/G
1 POWDER TOPICAL
Qty: 0 | Refills: 0 | DISCHARGE
Start: 2024-12-09

## 2024-12-09 RX ORDER — RIVAROXABAN 10 MG/1
15 TABLET, FILM COATED ORAL
Refills: 0 | Status: DISCONTINUED | OUTPATIENT
Start: 2024-12-09 | End: 2024-12-09

## 2024-12-09 RX ORDER — LOSARTAN POTASSIUM 100 MG/1
0.5 TABLET, FILM COATED ORAL
Qty: 0 | Refills: 0 | DISCHARGE

## 2024-12-09 RX ORDER — MULTIVIT WITH MINERALS/LUTEIN
1 TABLET ORAL
Refills: 0 | DISCHARGE

## 2024-12-09 RX ORDER — ALENDRONATE SODIUM 5 MG/1
1 TABLET ORAL
Qty: 0 | Refills: 0 | DISCHARGE

## 2024-12-09 RX ORDER — POLYETHYLENE GLYCOL 3350 17 G/17G
17 POWDER, FOR SOLUTION ORAL
Refills: 0 | DISCHARGE

## 2024-12-09 RX ORDER — OXYCODONE AND ACETAMINOPHEN 5; 325 MG/1; MG/1
1 TABLET ORAL
Refills: 0 | DISCHARGE

## 2024-12-09 RX ORDER — PANTOPRAZOLE SODIUM 40 MG/1
40 TABLET, DELAYED RELEASE ORAL
Qty: 0 | Refills: 0 | DISCHARGE
Start: 2024-12-09

## 2024-12-09 RX ADMIN — Medication 2 MILLIGRAM(S): at 10:17

## 2024-12-09 RX ADMIN — Medication 15 MILLILITER(S): at 17:31

## 2024-12-09 RX ADMIN — CHLORHEXIDINE GLUCONATE 1 APPLICATION(S): 1.2 RINSE ORAL at 05:22

## 2024-12-09 RX ADMIN — Medication 800 MILLIGRAM(S): at 17:32

## 2024-12-09 RX ADMIN — PANTOPRAZOLE SODIUM 40 MILLIGRAM(S): 40 TABLET, DELAYED RELEASE ORAL at 05:22

## 2024-12-09 RX ADMIN — CHLORHEXIDINE GLUCONATE 15 MILLILITER(S): 1.2 RINSE ORAL at 17:31

## 2024-12-09 RX ADMIN — CHLORHEXIDINE GLUCONATE 15 MILLILITER(S): 1.2 RINSE ORAL at 05:22

## 2024-12-09 RX ADMIN — ENOXAPARIN SODIUM 90 MILLIGRAM(S): 30 INJECTION SUBCUTANEOUS at 05:21

## 2024-12-09 RX ADMIN — Medication 1 APPLICATION(S): at 05:21

## 2024-12-09 RX ADMIN — RIVAROXABAN 15 MILLIGRAM(S): 10 TABLET, FILM COATED ORAL at 17:32

## 2024-12-09 RX ADMIN — Medication 1 APPLICATION(S): at 17:31

## 2024-12-09 RX ADMIN — NYSTATIN 1 APPLICATION(S): 100000 POWDER TOPICAL at 05:22

## 2024-12-09 RX ADMIN — LOSARTAN POTASSIUM 50 MILLIGRAM(S): 100 TABLET, FILM COATED ORAL at 05:22

## 2024-12-09 RX ADMIN — PANTOPRAZOLE SODIUM 40 MILLIGRAM(S): 40 TABLET, DELAYED RELEASE ORAL at 17:31

## 2024-12-09 RX ADMIN — NYSTATIN 1 APPLICATION(S): 100000 POWDER TOPICAL at 17:32

## 2024-12-09 NOTE — PROGRESS NOTE ADULT - ASSESSMENT
73-year-old female PMH A-fib on xarelto,, HTN, s/p cholecystectomy, congenital solitary kidney presents from NH to the ED for evaluation of weakness, decreased PO intake and abdominal distension.   Admitted to ICU for septic shock 2/2 ESBL proteus (UTI vs. GI source) complicated by gastric distension w/ pneumatosis, NOLA 2/2 ATN, hypernatremia. s/p NGT decompression (-3.1 L feculent output) in ED. Intubated 11/6 for AHRF. EGD 11/8 w/ multiple ulcers, largest 10cm), path +gastritis. Now with new R colon/cecal mass pending colonoscopy when more stable.   Extubated 11/13 to AVAPs, weaned to HFNC however transitioned back to BiPAP after desaturation. Patient was weak and unable to clear secretions despite non-invasive measures. Reintubated 11/18 PM, s/p trach/PEJ 11.19 with thoracic.       # Norovirus Diarrhea  - GI PCR on 11/23 and 1/24- positive x 2   - stool cdiff on 11/23 neg  - Recently changed feeds to peptamen  - supportive treatment   - imodium daily      #AHRF 2/2 septic shock due to ESBL proteus UTI s/p intubation x2, s/p trach 11/19  #HAGMA 2/2 lactic acidosis and uremia - resolved   #NOLA likely ATN 2/2 septic shock - resolved   #Possible aspiration  #Congenital solitary kidney  - Intubated 11/6, extubated 11/13 to AVAPS, re-intubated 11/18, s/p trach placement by CT surg 11/19  - UCx +ESBL proteus, BCX (-), trach culture with resp rosalva, sputum cx 11/12 resp rosalva  - s/p abx (freddie, diflucan, flagyl, vanc)  - c/w mechanical ventilation  - daily SBT, chest PT  - bronch 11/18 and 11/19 for mucus plugging       - few Gram pos cocci in clusters- commensal rosalva       - Per ID, monitor off abx, if clinically worsens can start linezolid  - Switched ipratropium inhaler to nebs  - CXR 11/22- improved      # trach bleed (on 11/30) - resolved   - Pt had an episode of  trach bleeding during the night  - Sx Recalled: Packed trach incision with surgicel and covered with gauze.   - Per Sx: lovenox held and now back to therapeutic dose.        #Dysphagia 2/2 critical illness s/p PEJ  - S&S eval - failed  - NGT placement - failed multiple times by ICU staff and ENT,  - s/p PEJ placement by CT surg 11/19  - C/w feeds   - Per Nutrition Support can change J feeds to Peptamen     #Abdominal distension w/ pneumatosis   #R colon/cecal mass on CT A/P  #Diarrhea- new on 11/23  - NGT placed for decompression in ED with 3.1L feculent output   - GI recs, surgery recs, vascular recs appreciated   - s/p EGD 11/8 with multiple ulcers, erosive gastritis  - EGD path: chronic gastritis, (-) Hpylori, no malignancy   - CT A/P with PO contrast w/ suspicious hepatic flexure narrowing and lobulated soft tissue density in R colon/cecum.   - TSH wnl  - c/w IV PPI BID   - colonoscopy when more stable as per GI      #Sacral DTI  - care per wound care team    #Afib  - holding xarelto   - c/w therapeutic lovenox  - TTE 11/05- EF 71%, hyperdynamic, LA enlargement, fibrocalcific aortic valve w/ moderate AS, trivial pericardial effusion    #HTN  - C/w amlodipine 10mg qd PO and losartan 50mg qd PO home meds     #MISC  - DVT ppx: lovenox  - Diet: NPO w/ TF   - GI ppx: Protonix BID  - Activity: as tolerated

## 2024-12-09 NOTE — PROGRESS NOTE ADULT - SUBJECTIVE AND OBJECTIVE BOX
PATRICIA SPRAGUE  [facility] 024 A ([facility])      Patient was evaluated and examined  by bedside, tolerated 4 hours of PS trial yesterday, remains vent dependent, no fever, mild loose stools, no significant diarrhea, tolerating peg feedings, no fever       REVIEW OF SYSTEMS: unable to obtain, patient is forgetful       T(C): , Max: 36.6 (12-09-24 @ 07:00)  HR: 70 (12-09-24 @ 07:00)  BP: 120/67 (12-09-24 @ 07:00)  RR: 20 (12-08-24 @ 13:00)  SpO2: 100% (12-09-24 @ 07:00)  CAPILLARY BLOOD GLUCOSE      POCT Blood Glucose.: 106 mg/dL (09 Dec 2024 06:50)  POCT Blood Glucose.: 118 mg/dL (08 Dec 2024 23:45)  POCT Blood Glucose.: 118 mg/dL (08 Dec 2024 18:03)  POCT Blood Glucose.: 81 mg/dL (08 Dec 2024 11:26)      PHYSICAL EXAM:  General: NAD, AAOX3, patient is laying comfortably in bed  HEENT: AT, NC, Supple, trach present  Lungs: CTA B/L, no wheezing, no rhonchi  CVS: normal S1, S2, RRR, NO M/G/R  Abdomen: soft, bowel sounds present, non-tender, non-distended ,peg present   Extremities: no edema, no clubbing, no cyanosis, positive peripheral pulses b/l  Neuro: generalized body weakness, gait not tested, sensory intact  Skin: no rash, no ecchymosis      LAB  CBC  Date: 12-07-24 @ 07:59  Mean cell Pzxptvbfsm21.7  Mean cell Hemoglobin Conc31.5  Mean cell Volum 94.2  Platelet count-Automate 218  RBC Count 2.76  Red Cell Distrib Width14.4  WBC Count10.75  % Albumin, Urine--  Hematocrit 26.0  Hemoglobin 8.2  CBC  Date: 12-06-24 @ 07:49  Mean cell Uxrqyfbhhv55.0  Mean cell Hemoglobin Conc32.3  Mean cell Volum 92.9  Platelet count-Automate 189  RBC Count 2.80  Red Cell Distrib Width14.6  WBC Count8.85  % Albumin, Urine--  Hematocrit 26.0  Hemoglobin 8.4  CBC  Date: 12-05-24 @ 07:54  Mean cell Nqimqepgyi15.3  Mean cell Hemoglobin Conc32.4  Mean cell Volum 93.4  Platelet count-Automate 206  RBC Count 2.71  Red Cell Distrib Width14.6  WBC Count8.40  % Albumin, Urine--  Hematocrit 25.3  Hemoglobin 8.2  CBC  Date: 12-04-24 @ 08:01  Mean cell Kgfsrdjwug44.1  Mean cell Hemoglobin Conc32.2  Mean cell Volum 93.6  Platelet count-Automate 216  RBC Count 2.82  Red Cell Distrib Width14.4  WBC Count8.64  % Albumin, Urine--  Hematocrit 26.4  Hemoglobin 8.5  CBC  Date: 12-03-24 @ 07:38  Mean cell Ujiolvrdib09.3  Mean cell Hemoglobin Conc32.5  Mean cell Volum 93.1  Platelet count-Automate 208  RBC Count 2.74  Red Cell Distrib Width14.0  WBC Count9.22  % Albumin, Urine--  Hematocrit 25.5  Hemoglobin 8.3    [facility]  12-08-24 @ 07:29  Blood Gas Arterial-Calcium,Ionized--  Blood Urea Nitrogen, Serum 17 mg/dL [10 - 20]  Carbon Dioxide, Serum19 mmol/L [17 - 32]  Chloride, Uqzgq151 mmol/L [98 - 110]  Creatinie, Serum0.7 mg/dL [0.7 - 1.5]  Glucose, Serum86 mg/dL [70 - 99]  Potassium, Serum3.7 mmol/L [3.5 - 5.0]  Sodium, Serum 139 mmol/L [135 - 146]  [facility]  12-07-24 @ 07:59  Blood Gas Arterial-Calcium,Ionized--  Blood Urea Nitrogen, Serum 21 mg/dL[H] [10 - 20]  Carbon Dioxide, Serum22 mmol/L [17 - 32]  Chloride, [id number] mmol/L[H] [98 - 110]  Creatinie, Serum0.9 mg/dL [0.7 - 1.5]  Glucose, Siutv252 mg/dL[H] [70 - 99]  Potassium, Serum4.0 mmol/L [3.5 - 5.0]  Sodium, Serum 141 mmol/L [135 - 146]  [facility]  12-06-24 @ 07:49  Blood Gas Arterial-Calcium,Ionized--  Blood Urea Nitrogen, Serum 23 mg/dL[H] [10 - 20]  Carbon Dioxide, Serum18 mmol/L [17 - 32]  Chloride, Mjabw025 mmol/L [98 - 110]  Creatinie, Serum0.9 mg/dL [0.7 - 1.5]  Glucose, Serum92 mg/dL [70 - 99]  Potassium, Serum3.9 mmol/L [3.5 - 5.0]  Sodium, Serum 138 mmol/L [135 - 146]  BMP  12-05-24 @ 07:54  Blood Gas Arterial-Calcium,Ionized--  Blood Urea Nitrogen, Serum 17 mg/dL [10 - 20]  Carbon Dioxide, Serum17 mmol/L [17 - 32]  Chloride, Ahgoh199 mmol/L [98 - 110]  Creatinie, Serum1.0 mg/dL [0.7 - 1.5]  Glucose, Serum93 mg/dL [70 - 99]  Potassium, Serum3.8 mmol/L [3.5 - 5.0]  Sodium, Serum 140 mmol/L [135 - 146]              Microbiology:    Culture - Stool (collected 11-23-24 @ 14:11)  Source: .Stool  Final Report (11-25-24 @ 22:02):    No enteric pathogens isolated.    (Stool culture examined for Salmonella,    Shigella, Campylobacter, Aeromonas, Plesiomonas,    Vibrio, E.coli O157 and Yersinia)    Culture - Fungal, Bronchial (collected 11-20-24 @ 12:00)  Source: Bronchial  Preliminary Report (12-04-24 @ 23:01):    No fungus isolated at 2 weeks.    Culture - Acid Fast - Bronchial w/Smear (collected 11-20-24 @ 12:00)  Source: Bronchial  Preliminary Report (12-04-24 @ 23:05):    No acid-fast bacilli isolated at 2 weeks.    Culture - Bronchial (collected 11-20-24 @ 12:00)  Source: Bronchial  Gram Stain (11-20-24 @ 23:18):    Moderate polymorphonuclear leukocytes per low power field    No squamous epithelial cells    Few Gram Positive Cocci in Clusters per oil power field  Final Report (11-22-24 @ 09:09):    Commensal rosalva consistent with body site    Culture - Fungal, Bronchial (collected 11-19-24 @ 11:25)  Source: Bronchial  Preliminary Report (12-04-24 @ 23:01):    No fungus isolated at 2 weeks.    Culture - Acid Fast - Bronchial w/Smear (collected 11-19-24 @ 11:25)  Source: Bronchial  Preliminary Report (12-04-24 @ 23:05):    No acid-fast bacilli isolated at 2 weeks.    Culture - Bronchial (collected 11-19-24 @ 11:25)  Source: Bronch Wash  Gram Stain (11-19-24 @ 23:26):    Rare polymorphonuclear leukocytes per low power field    No Squamous epithelial cells per low power field    No organisms seen per oil power field  Final Report (11-21-24 @ 08:25):    Commensal rosalva consistent with body site    Culture - Viral, General (collected 11-12-24 @ 09:01)    Culture - Fungal, Bronchial (collected 11-12-24 @ 09:01)  Source: Bronchial  Preliminary Report (12-04-24 @ 23:00):    No fungus isolated at 3 weeks.    Culture - Acid Fast - Bronchial w/Smear (collected 11-12-24 @ 09:01)  Source: Bronch Wash  Preliminary Report (12-04-24 @ 23:04):    No acid-fast bacilli isolated at 3 weeks.    Culture - Bronchial (collected 11-12-24 @ 09:01)  Source: Bronch Wash  Gram Stain (11-12-24 @ 20:33):    No Squamous epithelial cells seen per low power field    Rare polymorphonuclear leukocytes seen per low power field    No organisms seen per oil power field  Final Report (11-14-24 @ 09:38):    Commensal rosalva consistent with body site        Medications:  acetaminophen     Tablet .. 650 milliGRAM(s) Oral every 6 hours PRN  albuterol    90 MICROgram(s) HFA Inhaler 2 Puff(s) Inhalation every 6 hours PRN  chlorhexidine 0.12% Liquid 15 milliLiter(s) Oral Mucosa every 12 hours  chlorhexidine 2% Cloths 1 Application(s) Topical <User Schedule>  enoxaparin Injectable 90 milliGRAM(s) SubCutaneous every 12 hours  ipratropium 17 MICROgram(s) HFA Inhaler 2 Puff(s) Inhalation every 6 hours PRN  loperamide 2 milliGRAM(s) Oral every 6 hours PRN  losartan 50 milliGRAM(s) Oral daily  nystatin Cream 1 Application(s) Topical two times a day  pantoprazole   Suspension 40 milliGRAM(s) Oral two times a day  vitamin A & D Ointment 1 Application(s) Topical two times a day        Assessment and Plan:  73-year-old female PMH A-fib on xarelto, HTN, s/p cholecystectomy, congenital solitary kidney presents from NH to the ED for evaluation of weakness, decreased PO intake and abdominal distension.     Admitted to ICU for septic shock requiring pressors 2/2 ESBL proteus (UTI vs. GI source) complicated by gastric distension w/ pneumatosis, NOLA 2/2 ATN, hypernatremia. s/p NGT decompression (-3.1 L feculent output) in ED. Intubated 11/6 for AHRF. EGD 11/8 w/ multiple ulcers, largest 10cm, path +gastritis. Now with new R colon/cecal mass pending colonoscopy when more stable.   Extubated 11/13 to AVAPs, weaned to HFNC however transitioned back to BiPAP after desaturation. Patient was weak and unable to clear secretions despite non-invasive measures. Reintubated 11/18 PM, s/p trach/PEJ 11/19 with thoracic sx.    # Norovirus Diarrhea- GI PCR on 11/23 and 1/24- positive x 2 , GI PCR ,  on contact isolation , supportive tx.  - stool cdiff on 11/23 neg  12/8 - no diarrhea, reported, patient initially refused J feedings, requested to change feedings formula, currently resumed on J feedings,  monitor for BM's      #AHRF 2/2 septic shock due to ESBL proteus (UTI Vs. GI source) s/p intubation x2, s/p trach 11/19  #HAGMA 2/2 lactic acidosis and uremia - resolved   #NOLA likely ATN 2/2 septic shock - resolved   #Possible aspiration  #Congenital solitary kidney  - [facility] re-[facility] 11/18, s/p trach 11/19  - UCx +ESBL proteus, BCX (-), trach culture with resp rosalva, sputum cx 11/12 resp rosalva  - off pressors, off sedation  - s/p abx (finished 11/13)  - c/w mechanical ventilation, SBT as tolerated   - pain management,  chest PT/MDI q6h  - post bronch on 11/20- neg , f/u bronch cx/gram stain (11/18-neg , 11/19- neg )      #Dysphagia 2/2 critical illness s/p PEJ  - PEJ w/ thoracic 11/19- tolerating feedings well  J port clogged (11/29). Try Clog Zapper. Or call Thoracic Surgery.    #R colon/cecal mass on CT A/P  #Multiple gastric ulcers iso chronic gastritis   #Abdominal distension w/ pneumatosis - resolved   #Diarrhea - recurrent on 11/23- obtained GI PCR  - CT A/P on admission with severe gastric distension with new pneumatosis along posterior gastric wall suspicious for ischemia, foci of free intraperitoneal air ; suspicious portal venous gas; thick bladder wall (cystitis vs colovesicular fistula)   - s/p EGD 11/8 with multiple ulcers, erosive gastritis:  EGD path: chronic gastritis (-) H pylori, no malignancy   - repeated CT A/P with PO contrast 11.8 w/ suspicious hepatic flexure narrowing and lobulated soft tissue density in R colon/cecum.   -  Cdiff (-)  -  PPI BID   - colonoscopy when more stable as per GI    #Hypernatremia- corrected . free water via peg, close  BMP  # Hypomagnesemia -corrected     #Sacral DTI - offloading, wound care, pressure injury prevention    #Afib-  on therapeutic lovenox tx., upon d/c transitioned back to Xarelto tx.     #HTN -- continue  losartan 50mg via peg once daily     #TRacheostomy bleeding: noted on 11/30, s/p surgicel by surgery. Stopped as of 12/1. Increased lovenox back from prophylactic to therapeutic dose.     DVT ppx:  on lovenox tx.    Diet: NPO w/ PEJ feeding  GI ppx/Bowel regimen: PPI BID    Code status: full code      #Progress Note Handoff:  Patient with decreased diarrhea,  medically optimized, d/c to SNF  today ,   vent management as per Pulm. team, PS trials daily as tolerated   #Family Discussion: yes, medical team     Disposition: SNF today if bed is available     Total time spent to complete patient's bedside assessment, review medical chart, discuss medical plan of care with covering medical team was more than 35 minutes with >50% of time spent face to face with patient, discussion with patient/family and/or coordination of care .          PATRICIA SPRAGUE  [facility] 3A 024 A ([facility])      Patient was evaluated and examined  by bedside, tolerated 4 hours of PS trial yesterday, remains vent dependent, no fever, mild loose stools, no significant diarrhea, tolerating peg feedings, no fever       REVIEW OF SYSTEMS: unable to obtain, patient is forgetful       T(C): , Max: 36.6 (12-09-24 @ 07:00)  HR: 70 (12-09-24 @ 07:00)  BP: 120/67 (12-09-24 @ 07:00)  RR: 20 (12-08-24 @ 13:00)  SpO2: 100% (12-09-24 @ 07:00)  CAPILLARY BLOOD GLUCOSE      POCT Blood Glucose.: 106 mg/dL (09 Dec 2024 06:50)  POCT Blood Glucose.: 118 mg/dL (08 Dec 2024 23:45)  POCT Blood Glucose.: 118 mg/dL (08 Dec 2024 18:03)  POCT Blood Glucose.: 81 mg/dL (08 Dec 2024 11:26)      PHYSICAL EXAM:  General: NAD, AAOX3, patient is laying comfortably in bed  HEENT: AT, NC, Supple, trach present  Lungs: CTA B/L, no wheezing, no rhonchi  CVS: normal S1, S2, RRR, NO M/G/R  Abdomen: soft, bowel sounds present, non-tender, non-distended ,peg present   Extremities: no edema, no clubbing, no cyanosis, positive peripheral pulses b/l  Neuro: generalized body weakness, gait not tested, sensory intact  Skin: no rash, no ecchymosis      LAB  CBC  Date: 12-07-24 @ 07:59  Mean cell Dwonghasem79.7  Mean cell Hemoglobin Conc31.5  Mean cell Volum 94.2  Platelet count-Automate 218  RBC Count 2.76  Red Cell Distrib Width14.4  WBC Count10.75  % Albumin, Urine--  Hematocrit 26.0  Hemoglobin 8.2  CBC  Date: 12-06-24 @ 07:49  Mean cell Ujpamptpaz84.0  Mean cell Hemoglobin Conc32.3  Mean cell Volum 92.9  Platelet count-Automate 189  RBC Count 2.80  Red Cell Distrib Width14.6  WBC Count8.85  % Albumin, Urine--  Hematocrit 26.0  Hemoglobin 8.4  CBC  Date: 12-05-24 @ 07:54  Mean cell Iyribebykn68.3  Mean cell Hemoglobin Conc32.4  Mean cell Volum 93.4  Platelet count-Automate 206  RBC Count 2.71  Red Cell Distrib Width14.6  WBC Count8.40  % Albumin, Urine--  Hematocrit 25.3  Hemoglobin 8.2  CBC  Date: 12-04-24 @ 08:01  Mean cell Zmolfxglzj74.1  Mean cell Hemoglobin Conc32.2  Mean cell Volum 93.6  Platelet count-Automate 216  RBC Count 2.82  Red Cell Distrib Width14.4  WBC Count8.64  % Albumin, Urine--  Hematocrit 26.4  Hemoglobin 8.5  CBC  Date: 12-03-24 @ 07:38  Mean cell Wzhglzhmjm70.3  Mean cell Hemoglobin Conc32.5  Mean cell Volum 93.1  Platelet count-Automate 208  RBC Count 2.74  Red Cell Distrib Width14.0  WBC Count9.22  % Albumin, Urine--  Hematocrit 25.5  Hemoglobin 8.3    [facility]  12-08-24 @ 07:29  Blood Gas Arterial-Calcium,Ionized--  Blood Urea Nitrogen, Serum 17 mg/dL [10 - 20]  Carbon Dioxide, Serum19 mmol/L [17 - 32]  Chloride, Amucu785 mmol/L [98 - 110]  Creatinie, Serum0.7 mg/dL [0.7 - 1.5]  Glucose, Serum86 mg/dL [70 - 99]  Potassium, Serum3.7 mmol/L [3.5 - 5.0]  Sodium, Serum 139 mmol/L [135 - 146]  [facility]  12-07-24 @ 07:59  Blood Gas Arterial-Calcium,Ionized--  Blood Urea Nitrogen, Serum 21 mg/dL[H] [10 - 20]  Carbon Dioxide, Serum22 mmol/L [17 - 32]  Chloride, [id number] mmol/L[H] [98 - 110]  Creatinie, Serum0.9 mg/dL [0.7 - 1.5]  Glucose, Gwttg789 mg/dL[H] [70 - 99]  Potassium, Serum4.0 mmol/L [3.5 - 5.0]  Sodium, Serum 141 mmol/L [135 - 146]  [facility]  12-06-24 @ 07:49  Blood Gas Arterial-Calcium,Ionized--  Blood Urea Nitrogen, Serum 23 mg/dL[H] [10 - 20]  Carbon Dioxide, Serum18 mmol/L [17 - 32]  Chloride, Bhxgr266 mmol/L [98 - 110]  Creatinie, Serum0.9 mg/dL [0.7 - 1.5]  Glucose, Serum92 mg/dL [70 - 99]  Potassium, Serum3.9 mmol/L [3.5 - 5.0]  Sodium, Serum 138 mmol/L [135 - 146]  BMP  12-05-24 @ 07:54  Blood Gas Arterial-Calcium,Ionized--  Blood Urea Nitrogen, Serum 17 mg/dL [10 - 20]  Carbon Dioxide, Serum17 mmol/L [17 - 32]  Chloride, Qjsez942 mmol/L [98 - 110]  Creatinie, Serum1.0 mg/dL [0.7 - 1.5]  Glucose, Serum93 mg/dL [70 - 99]  Potassium, Serum3.8 mmol/L [3.5 - 5.0]  Sodium, Serum 140 mmol/L [135 - 146]              Microbiology:    Culture - Stool (collected 11-23-24 @ 14:11)  Source: .Stool  Final Report (11-25-24 @ 22:02):    No enteric pathogens isolated.    (Stool culture examined for Salmonella,    Shigella, Campylobacter, Aeromonas, Plesiomonas,    Vibrio, E.coli O157 and Yersinia)    Culture - Fungal, Bronchial (collected 11-20-24 @ 12:00)  Source: Bronchial  Preliminary Report (12-04-24 @ 23:01):    No fungus isolated at 2 weeks.    Culture - Acid Fast - Bronchial w/Smear (collected 11-20-24 @ 12:00)  Source: Bronchial  Preliminary Report (12-04-24 @ 23:05):    No acid-fast bacilli isolated at 2 weeks.    Culture - Bronchial (collected 11-20-24 @ 12:00)  Source: Bronchial  Gram Stain (11-20-24 @ 23:18):    Moderate polymorphonuclear leukocytes per low power field    No squamous epithelial cells    Few Gram Positive Cocci in Clusters per oil power field  Final Report (11-22-24 @ 09:09):    Commensal rosalva consistent with body site    Culture - Fungal, Bronchial (collected 11-19-24 @ 11:25)  Source: Bronchial  Preliminary Report (12-04-24 @ 23:01):    No fungus isolated at 2 weeks.    Culture - Acid Fast - Bronchial w/Smear (collected 11-19-24 @ 11:25)  Source: Bronchial  Preliminary Report (12-04-24 @ 23:05):    No acid-fast bacilli isolated at 2 weeks.    Culture - Bronchial (collected 11-19-24 @ 11:25)  Source: Bronch Wash  Gram Stain (11-19-24 @ 23:26):    Rare polymorphonuclear leukocytes per low power field    No Squamous epithelial cells per low power field    No organisms seen per oil power field  Final Report (11-21-24 @ 08:25):    Commensal rosalva consistent with body site    Culture - Viral, General (collected 11-12-24 @ 09:01)    Culture - Fungal, Bronchial (collected 11-12-24 @ 09:01)  Source: Bronchial  Preliminary Report (12-04-24 @ 23:00):    No fungus isolated at 3 weeks.    Culture - Acid Fast - Bronchial w/Smear (collected 11-12-24 @ 09:01)  Source: Bronch Wash  Preliminary Report (12-04-24 @ 23:04):    No acid-fast bacilli isolated at 3 weeks.    Culture - Bronchial (collected 11-12-24 @ 09:01)  Source: Bronch Wash  Gram Stain (11-12-24 @ 20:33):    No Squamous epithelial cells seen per low power field    Rare polymorphonuclear leukocytes seen per low power field    No organisms seen per oil power field  Final Report (11-14-24 @ 09:38):    Commensal rosalva consistent with body site        Medications:  acetaminophen     Tablet .. 650 milliGRAM(s) Oral every 6 hours PRN  albuterol    90 MICROgram(s) HFA Inhaler 2 Puff(s) Inhalation every 6 hours PRN  chlorhexidine 0.12% Liquid 15 milliLiter(s) Oral Mucosa every 12 hours  chlorhexidine 2% Cloths 1 Application(s) Topical <User Schedule>  enoxaparin Injectable 90 milliGRAM(s) SubCutaneous every 12 hours  ipratropium 17 MICROgram(s) HFA Inhaler 2 Puff(s) Inhalation every 6 hours PRN  loperamide 2 milliGRAM(s) Oral every 6 hours PRN  losartan 50 milliGRAM(s) Oral daily  nystatin Cream 1 Application(s) Topical two times a day  pantoprazole   Suspension 40 milliGRAM(s) Oral two times a day  vitamin A & D Ointment 1 Application(s) Topical two times a day        Assessment and Plan:  73-year-old female PMH A-fib on xarelto, HTN, s/p cholecystectomy, congenital solitary kidney presents from NH to the ED for evaluation of weakness, decreased PO intake and abdominal distension.     Admitted to ICU for septic shock requiring pressors 2/2 ESBL proteus (UTI vs. GI source) complicated by gastric distension w/ pneumatosis, NOLA 2/2 ATN, hypernatremia. s/p NGT decompression (-3.1 L feculent output) in ED. Intubated 11/6 for AHRF. EGD 11/8 w/ multiple ulcers, largest 10cm, path +gastritis. Now with new R colon/cecal mass pending colonoscopy when more stable.   Extubated 11/13 to AVAPs, weaned to HFNC however transitioned back to BiPAP after desaturation. Patient was weak and unable to clear secretions despite non-invasive measures. Reintubated 11/18 PM, s/p trach/PEJ 11/19 with thoracic sx.    # Norovirus Diarrhea- GI PCR on 11/23 and 1/24- positive x 2 , GI PCR ,  on contact isolation , supportive tx.  - stool cdiff on 11/23 neg  12/8 - no diarrhea, reported, patient initially refused J feedings, requested to change feedings formula, currently resumed on J feedings,  monitor for BM's      #AHRF 2/2 septic shock due to ESBL proteus (UTI Vs. GI source) s/p intubation x2, s/p trach 11/19  #HAGMA 2/2 lactic acidosis and uremia - resolved   #NOLA likely ATN 2/2 septic shock - resolved   #Possible aspiration  #Congenital solitary kidney  - [facility]/18, s/p trach 11/19  - UCx +ESBL proteus, BCX (-), trach culture with resp rosalva, sputum cx 11/12 resp rosalva  - off pressors, off sedation  - s/p abx (finished 11/13)  - c/w mechanical ventilation, SBT as tolerated   - pain management,  chest PT/MDI q6h  - post bronch on 11/20- neg , f/u bronch cx/gram stain (11/18-neg , 11/19- neg )      #Dysphagia 2/2 critical illness s/p PEJ  - PEJ placement w/ thoracic Surgical team  11/19- continue J feedings as per Nutritionist specialist recommendations.      #R colon/cecal mass on CT A/P  #Multiple gastric ulcers ,  chronic gastritis   #Abdominal distension w/ pneumatosis - resolved   #Diarrhea - recurrent on 11/23- obtained GI PCR  - CT A/P on admission with severe gastric distension with new pneumatosis along posterior gastric wall suspicious for ischemia, foci of free intraperitoneal air ; suspicious portal venous gas; thick bladder wall (cystitis vs colovesicular fistula)   - s/p EGD 11/8 with multiple ulcers, erosive gastritis:  EGD path: chronic gastritis (-) H pylori, no malignancy - continue  PPI BID  - repeated CT A/P with PO contrast 11.8 w/ suspicious hepatic flexure narrowing and lobulated soft tissue density in R colon/cecum.   -  Cdiff (-)   - colonoscopy when more stable as per GI, repeat EGD in 2 months ( January 8th, 2025)    #Hypernatremia- corrected . free water via peg, close outpatient BMP  # Hypomagnesemia -corrected     #Sacral DTI - offloading, wound care, pressure injury prevention    #Afib-  on therapeutic lovenox tx., upon d/c transitioned back to Xarelto tx.     #HTN -- continue  losartan 50mg via peg once daily     #TRacheostomy bleeding: noted on 11/30, s/p surgicel by surgery. Stopped as of 12/1. Increased lovenox back from prophylactic to therapeutic dose.     DVT ppx:  on lovenox tx.    Diet: NPO w/ PEJ feeding  GI ppx/Bowel regimen: PPI BID    Code status: full code      #Progress Note Handoff:  Patient with decreased diarrhea,  medically optimized, d/c to SNF  today ,   vent management as per Pulm. team, PS trials daily as tolerated   #Family Discussion: yes, medical team     Disposition: SNF today if bed is available     Total time spent to complete patient's bedside assessment, review medical chart, discuss medical plan of care with covering medical team was more than 35 minutes with >50% of time spent face to face with patient, discussion with patient/family and/or coordination of care .

## 2024-12-09 NOTE — PROGRESS NOTE ADULT - SUBJECTIVE AND OBJECTIVE BOX
SUBJECTIVE/OVERNIGHT EVENTS  Today is hospital day 36d. This morning patient was seen and examined at bedside, resting comfortably in bed. No acute or major events overnight.    HPI:  73-year-old female PMH A-fib on xarelto,, HTN, s/p cholecystectomy, congenital solitary kidney presents from NH to the ED for evaluation of weakness, decreased PO intake and abdominal distension. Pt's son Everton says pt has not been feeling well the past few days, and has had poor appetite which is unlike her. She's been more tired and weak, unable to get out of bed. Has not had a bowel movement in 3 days, pt unsure if she's been passing gas otherwise. She was noted to be hypotensive and have an elevated wbc count at the NH as well. No dysuria, chest pain, SOB, cough or fever per pt otherwise.  No other complaints currently.     In the ED, BP 66/45 S/P LR 2800cc bolus with no improvement in BP, started on peripheral levo. Labs with wbc 19.29, vbg lactate 2.0-->2.3, pH 7.2, pCO2 45, Na 133, AG 21, Cr 4.3 (baseline 1.1), UA contaminated. CTAP with severe gastric distension, multiple foci of intraperitoneal free air and urinary bladder thickening with free air. Surgery consulted, no intervention for now, NGT placed for gastric decompression with 3.1L feculent output, recommend GI consult.      (03 Nov 2024 21:25)      MEDICATIONS  STANDING MEDICATIONS  chlorhexidine 0.12% Liquid 15 milliLiter(s) Oral Mucosa every 12 hours  chlorhexidine 2% Cloths 1 Application(s) Topical <User Schedule>  enoxaparin Injectable 90 milliGRAM(s) SubCutaneous every 12 hours  losartan 50 milliGRAM(s) Oral daily  nystatin Cream 1 Application(s) Topical two times a day  pantoprazole   Suspension 40 milliGRAM(s) Oral two times a day  vitamin A & D Ointment 1 Application(s) Topical two times a day    PRN MEDICATIONS  acetaminophen     Tablet .. 650 milliGRAM(s) Oral every 6 hours PRN  albuterol    90 MICROgram(s) HFA Inhaler 2 Puff(s) Inhalation every 6 hours PRN  ipratropium 17 MICROgram(s) HFA Inhaler 2 Puff(s) Inhalation every 6 hours PRN  loperamide 2 milliGRAM(s) Oral every 6 hours PRN    VITALS  T(F): 97.9 (12-09-24 @ 07:00), Max: 97.9 (12-09-24 @ 07:00)  HR: 60 (12-09-24 @ 10:08) (60 - 77)  BP: 120/67 (12-09-24 @ 07:00) (118/81 - 126/74)  RR: 20 (12-08-24 @ 13:00) (20 - 20)  SpO2: 100% (12-09-24 @ 10:08) (99% - 100%)  POCT Blood Glucose.: 106 mg/dL (12-09-24 @ 06:50)  POCT Blood Glucose.: 118 mg/dL (12-08-24 @ 23:45)  POCT Blood Glucose.: 118 mg/dL (12-08-24 @ 18:03)  POCT Blood Glucose.: 81 mg/dL (12-08-24 @ 11:26)          PHYSICAL EXAM      GENERAL: No acute distress, well-developed  CHEST/LUNG: Clear to auscultation bilaterally; No wheeze, equal breath sounds bilaterally, respirations nonlabored  HEART: Regular rate and rhythm; No murmurs, rubs, or gallops  ABDOMEN: Soft, nontender, nondistended; Bowel sounds present, no organomegaly  NEUROLOGY: AAOx3, non-focal, moves all extremities spontaneously      PAST MEDICAL & SURGICAL HISTORY:  HTN (hypertension)      Diabetes mellitus      Obesity      Gastroesophageal reflux disease      Active asthma      Generalized OA      Afib      H/O hernia repair  2011 and revised in 2013      History of cholecystectomy            LABS    139  |  110  |  17  -------------------------<  86   12-08-24 @ 07:29  3.7  |  19  |  0.7    Ca      10.6     12-08-24 @ 07:29  Mg     1.6     12-08-24 @ 07:29    TPro  5.4  /  Alb  3.3  /  TBili  0.9  /  DBili  x   /  AST  10  /  ALT  7   /  AlkPhos  82  /  GGT  x     12-08-24 @ 07:29        Urinalysis Basic - ( 08 Dec 2024 07:29 )    Color: x / Appearance: x / SG: x / pH: x  Gluc: 86 mg/dL / Ketone: x  / Bili: x / Urobili: x   Blood: x / Protein: x / Nitrite: x   Leuk Esterase: x / RBC: x / WBC x   Sq Epi: x / Non Sq Epi: x / Bacteria: x          IMAGING

## 2024-12-09 NOTE — PROGRESS NOTE ADULT - ASSESSMENT
IMPRESSION:    Acute hypoxemic respiratory failure SP trach and PEG   Left lung atelectasis SP multiple bronchoscopies   Septic shock - resolved  UTI  Suspected colon mass  Severe gastric distension w/ pneumatosis along posterior gastric wall resolved  Possible aspiration / LLL consolidation  Thrombocytopenia improved   hypernatremia  Gastric ulcer   NOLA resolved  Afib on Xarelto  HO HTN  HO ESBL Proteus    PLAN:    CNS:  Pain control as needed .     HEENT: Oral care. Trach care.      PULMONARY: Vent changes;  Wean o2 as tolerated.  40%,  PEEP 8.  Keep O2 sat 92-96%.  Pulmonary toilet.  PSV as tolerated      CARDIOVASCULAR: Avoid overload.     GI: GI prophylaxis.  Tube feeding.  Bowel regimen.      RENAL:  Follow up lytes. Correct as needed.      INFECTIOUS DISEASE: SP Meropenem course.  Monitor VS     HEMATOLOGICAL: Therapeutic AC.  Monitor CBC.      ENDOCRINE:  Follow up FS. Insulin protocol if needed.    MUSCULOSKELETAL: Bed chair position.  Off loading    Full code.   Vent unit  DC planing

## 2024-12-09 NOTE — DISCHARGE NOTE NURSING/CASE MANAGEMENT/SOCIAL WORK - NSDCVIVACCINE_GEN_ALL_CORE_FT
influenza, injectable, quadrivalent, preservative free; 02-Oct-2019 12:29; Ellie Gonzalez (RN); [employer]; 3bs44 (Exp. Date: 20-Jun-2020); IntraMuscular; Deltoid Left.; 0.5 milliLiter(s); VIS (VIS Published: 15-Aug-2019, VIS Presented: 02-Oct-2019);

## 2024-12-09 NOTE — DISCHARGE NOTE NURSING/CASE MANAGEMENT/SOCIAL WORK - FINANCIAL ASSISTANCE
[facility] provides services at a reduced cost to those who are determined to be eligible through [facility]’s financial assistance program. Information regarding [facility]’s financial assistance program can be found by going to [email or web address] or by calling [phone].

## 2024-12-09 NOTE — DISCHARGE NOTE NURSING/CASE MANAGEMENT/SOCIAL WORK - PATIENT PORTAL LINK FT
You can access the FollowMyHealth Patient Portal offered by [facility] by registering at the following website: [email or web address]. By joining [facility]’s FollowMyHealth portal, you will also be able to view your health information using other applications (apps) compatible with our system.

## 2024-12-09 NOTE — DISCHARGE NOTE NURSING/CASE MANAGEMENT/SOCIAL WORK - NSDCPEFALRISK_GEN_ALL_CORE
For information on Fall & Injury Prevention, visit: [email or web address] OR  [email or web address] OR  https://www.cdc.gov/steadi/patient.html

## 2024-12-09 NOTE — PROGRESS NOTE ADULT - PROVIDER SPECIALTY LIST ADULT
Critical Care
Critical Care
Gastroenterology
Hospitalist
Internal Medicine
Internal Medicine
MICU
Nephrology
Nutrition Support
Pulmonology
Surgery
Surgery
Thoracic Surgery
Vascular Surgery
Critical Care
Hospitalist
Hospitalist
Infectious Disease
Internal Medicine
Internal Medicine
MICU
Nephrology
Nephrology
Pulmonology
Surgery
Surgery
Vascular Surgery
Critical Care
Gastroenterology
Hospitalist
Infectious Disease
Infectious Disease
Internal Medicine
MICU
Nephrology
Pulmonology
Surgery
Critical Care
Infectious Disease
Palliative Care
Palliative Care

## 2024-12-11 LAB
CULTURE RESULTS: SIGNIFICANT CHANGE UP
SPECIMEN SOURCE: SIGNIFICANT CHANGE UP

## 2024-12-13 DIAGNOSIS — K56.609 UNSPECIFIED INTESTINAL OBSTRUCTION, UNSPECIFIED AS TO PARTIAL VERSUS COMPLETE OBSTRUCTION: ICD-10-CM

## 2024-12-13 DIAGNOSIS — A08.11 ACUTE GASTROENTEROPATHY DUE TO NORWALK AGENT: ICD-10-CM

## 2024-12-13 DIAGNOSIS — Z16.12 EXTENDED SPECTRUM BETA LACTAMASE (ESBL) RESISTANCE: ICD-10-CM

## 2024-12-13 DIAGNOSIS — Z78.1 PHYSICAL RESTRAINT STATUS: ICD-10-CM

## 2024-12-13 DIAGNOSIS — K25.9 GASTRIC ULCER, UNSPECIFIED AS ACUTE OR CHRONIC, WITHOUT HEMORRHAGE OR PERFORATION: ICD-10-CM

## 2024-12-13 DIAGNOSIS — M19.90 UNSPECIFIED OSTEOARTHRITIS, UNSPECIFIED SITE: ICD-10-CM

## 2024-12-13 DIAGNOSIS — I48.91 UNSPECIFIED ATRIAL FIBRILLATION: ICD-10-CM

## 2024-12-13 DIAGNOSIS — I10 ESSENTIAL (PRIMARY) HYPERTENSION: ICD-10-CM

## 2024-12-13 DIAGNOSIS — E83.52 HYPERCALCEMIA: ICD-10-CM

## 2024-12-13 DIAGNOSIS — E11.9 TYPE 2 DIABETES MELLITUS WITHOUT COMPLICATIONS: ICD-10-CM

## 2024-12-13 DIAGNOSIS — Z79.01 LONG TERM (CURRENT) USE OF ANTICOAGULANTS: ICD-10-CM
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
## 2024-12-13 DIAGNOSIS — A41.59 OTHER GRAM-NEGATIVE SEPSIS: ICD-10-CM

## 2024-12-13 DIAGNOSIS — J96.01 ACUTE RESPIRATORY FAILURE WITH HYPOXIA: ICD-10-CM

## 2024-12-13 DIAGNOSIS — K66.8 OTHER SPECIFIED DISORDERS OF PERITONEUM: ICD-10-CM

## 2024-12-13 DIAGNOSIS — E87.0 HYPEROSMOLALITY AND HYPERNATREMIA: ICD-10-CM

## 2024-12-13 DIAGNOSIS — E66.9 OBESITY, UNSPECIFIED: ICD-10-CM

## 2024-12-13 DIAGNOSIS — E87.20 ACIDOSIS, UNSPECIFIED: ICD-10-CM

## 2024-12-13 DIAGNOSIS — N30.90 CYSTITIS, UNSPECIFIED WITHOUT HEMATURIA: ICD-10-CM

## 2024-12-13 DIAGNOSIS — R65.21 SEVERE SEPSIS WITH SEPTIC SHOCK: ICD-10-CM

## 2024-12-13 DIAGNOSIS — Q60.0 RENAL AGENESIS, UNILATERAL: ICD-10-CM

## 2024-12-13 DIAGNOSIS — N17.0 ACUTE KIDNEY FAILURE WITH TUBULAR NECROSIS: ICD-10-CM

## 2024-12-13 DIAGNOSIS — Z90.49 ACQUIRED ABSENCE OF OTHER SPECIFIED PARTS OF DIGESTIVE TRACT: ICD-10-CM

## 2024-12-13 DIAGNOSIS — J45.909 UNSPECIFIED ASTHMA, UNCOMPLICATED: ICD-10-CM

## 2024-12-18 LAB
CULTURE RESULTS: SIGNIFICANT CHANGE UP
CULTURE RESULTS: SIGNIFICANT CHANGE UP
SPECIMEN SOURCE: SIGNIFICANT CHANGE UP
SPECIMEN SOURCE: SIGNIFICANT CHANGE UP

## 2024-12-28 LAB
CULTURE RESULTS: SIGNIFICANT CHANGE UP
SPECIMEN SOURCE: SIGNIFICANT CHANGE UP

## 2025-04-09 ENCOUNTER — INPATIENT (INPATIENT)
Facility: HOSPITAL | Age: 75
LOS: 8 days | Discharge: NURSING, REHAB OR LONG-TERM CARE | DRG: 425 | End: 2025-04-18
Attending: INTERNAL MEDICINE | Admitting: INTERNAL MEDICINE
Payer: MEDICAID

## 2025-04-09 VITALS
SYSTOLIC BLOOD PRESSURE: 4 MMHG | RESPIRATION RATE: 18 BRPM | TEMPERATURE: 98 F | DIASTOLIC BLOOD PRESSURE: 72 MMHG | OXYGEN SATURATION: 99 % | HEART RATE: 69 BPM

## 2025-04-09 DIAGNOSIS — Z90.49 ACQUIRED ABSENCE OF OTHER SPECIFIED PARTS OF DIGESTIVE TRACT: Chronic | ICD-10-CM

## 2025-04-09 DIAGNOSIS — Z98.890 OTHER SPECIFIED POSTPROCEDURAL STATES: Chronic | ICD-10-CM

## 2025-04-09 PROCEDURE — 99285 EMERGENCY DEPT VISIT HI MDM: CPT

## 2025-04-09 NOTE — ED ADULT NURSE NOTE - NSFALLHARMRISKINTERV_ED_ALL_ED

## 2025-04-10 DIAGNOSIS — E87.6 HYPOKALEMIA: ICD-10-CM

## 2025-04-10 LAB
24R-OH-CALCIDIOL SERPL-MCNC: 30 NG/ML — SIGNIFICANT CHANGE UP
ALBUMIN SERPL ELPH-MCNC: 3.5 G/DL — SIGNIFICANT CHANGE UP (ref 3.5–5.2)
ALBUMIN SERPL ELPH-MCNC: 3.9 G/DL — SIGNIFICANT CHANGE UP (ref 3.5–5.2)
ALP SERPL-CCNC: 110 U/L — SIGNIFICANT CHANGE UP (ref 30–115)
ALP SERPL-CCNC: 114 U/L — SIGNIFICANT CHANGE UP (ref 30–115)
ALT FLD-CCNC: 32 U/L — SIGNIFICANT CHANGE UP (ref 0–41)
ALT FLD-CCNC: 34 U/L — SIGNIFICANT CHANGE UP (ref 0–41)
ANION GAP SERPL CALC-SCNC: 14 MMOL/L — SIGNIFICANT CHANGE UP (ref 7–14)
ANION GAP SERPL CALC-SCNC: 14 MMOL/L — SIGNIFICANT CHANGE UP (ref 7–14)
ANION GAP SERPL CALC-SCNC: 16 MMOL/L — HIGH (ref 7–14)
AST SERPL-CCNC: 22 U/L — SIGNIFICANT CHANGE UP (ref 0–41)
AST SERPL-CCNC: 22 U/L — SIGNIFICANT CHANGE UP (ref 0–41)
BASOPHILS # BLD AUTO: 0.08 K/UL — SIGNIFICANT CHANGE UP (ref 0–0.2)
BASOPHILS # BLD AUTO: 0.11 K/UL — SIGNIFICANT CHANGE UP (ref 0–0.2)
BASOPHILS NFR BLD AUTO: 0.7 % — SIGNIFICANT CHANGE UP (ref 0–1)
BASOPHILS NFR BLD AUTO: 0.8 % — SIGNIFICANT CHANGE UP (ref 0–1)
BILIRUB SERPL-MCNC: 1.6 MG/DL — HIGH (ref 0.2–1.2)
BILIRUB SERPL-MCNC: 1.8 MG/DL — HIGH (ref 0.2–1.2)
BUN SERPL-MCNC: 14 MG/DL — SIGNIFICANT CHANGE UP (ref 10–20)
BUN SERPL-MCNC: 16 MG/DL — SIGNIFICANT CHANGE UP (ref 10–20)
BUN SERPL-MCNC: 18 MG/DL — SIGNIFICANT CHANGE UP (ref 10–20)
CALCIUM SERPL-MCNC: 12.4 MG/DL — HIGH (ref 8.4–10.5)
CALCIUM SERPL-MCNC: 12.7 MG/DL — HIGH (ref 8.4–10.5)
CALCIUM SERPL-MCNC: 13.1 MG/DL — HIGH (ref 8.4–10.5)
CHLORIDE SERPL-SCNC: 109 MMOL/L — SIGNIFICANT CHANGE UP (ref 98–110)
CHLORIDE SERPL-SCNC: 111 MMOL/L — HIGH (ref 98–110)
CHLORIDE SERPL-SCNC: 112 MMOL/L — HIGH (ref 98–110)
CO2 SERPL-SCNC: 13 MMOL/L — LOW (ref 17–32)
CO2 SERPL-SCNC: 16 MMOL/L — LOW (ref 17–32)
CO2 SERPL-SCNC: 18 MMOL/L — SIGNIFICANT CHANGE UP (ref 17–32)
CREAT SERPL-MCNC: 0.7 MG/DL — SIGNIFICANT CHANGE UP (ref 0.7–1.5)
CREAT SERPL-MCNC: 0.8 MG/DL — SIGNIFICANT CHANGE UP (ref 0.7–1.5)
CREAT SERPL-MCNC: 0.8 MG/DL — SIGNIFICANT CHANGE UP (ref 0.7–1.5)
EGFR: 78 ML/MIN/1.73M2 — SIGNIFICANT CHANGE UP
EGFR: 92 ML/MIN/1.73M2 — SIGNIFICANT CHANGE UP
EGFR: 92 ML/MIN/1.73M2 — SIGNIFICANT CHANGE UP
EOSINOPHIL # BLD AUTO: 0.17 K/UL — SIGNIFICANT CHANGE UP (ref 0–0.7)
EOSINOPHIL # BLD AUTO: 0.18 K/UL — SIGNIFICANT CHANGE UP (ref 0–0.7)
EOSINOPHIL NFR BLD AUTO: 1.3 % — SIGNIFICANT CHANGE UP (ref 0–8)
EOSINOPHIL NFR BLD AUTO: 1.5 % — SIGNIFICANT CHANGE UP (ref 0–8)
GLUCOSE BLDC GLUCOMTR-MCNC: 80 MG/DL — SIGNIFICANT CHANGE UP (ref 70–99)
GLUCOSE BLDC GLUCOMTR-MCNC: 86 MG/DL — SIGNIFICANT CHANGE UP (ref 70–99)
GLUCOSE SERPL-MCNC: 100 MG/DL — HIGH (ref 70–99)
GLUCOSE SERPL-MCNC: 60 MG/DL — LOW (ref 70–99)
GLUCOSE SERPL-MCNC: 87 MG/DL — SIGNIFICANT CHANGE UP (ref 70–99)
HCT VFR BLD CALC: 37.9 % — SIGNIFICANT CHANGE UP (ref 37–47)
HCT VFR BLD CALC: 39.2 % — SIGNIFICANT CHANGE UP (ref 37–47)
HGB BLD-MCNC: 12.6 G/DL — SIGNIFICANT CHANGE UP (ref 12–16)
HGB BLD-MCNC: 13.2 G/DL — SIGNIFICANT CHANGE UP (ref 12–16)
IMM GRANULOCYTES NFR BLD AUTO: 0.3 % — SIGNIFICANT CHANGE UP (ref 0.1–0.3)
IMM GRANULOCYTES NFR BLD AUTO: 0.5 % — HIGH (ref 0.1–0.3)
LYMPHOCYTES # BLD AUTO: 19.1 % — LOW (ref 20.5–51.1)
LYMPHOCYTES # BLD AUTO: 2.26 K/UL — SIGNIFICANT CHANGE UP (ref 1.2–3.4)
LYMPHOCYTES # BLD AUTO: 2.68 K/UL — SIGNIFICANT CHANGE UP (ref 1.2–3.4)
LYMPHOCYTES # BLD AUTO: 20.4 % — LOW (ref 20.5–51.1)
MAGNESIUM SERPL-MCNC: 1.4 MG/DL — LOW (ref 1.8–2.4)
MAGNESIUM SERPL-MCNC: 1.4 MG/DL — LOW (ref 1.8–2.4)
MAGNESIUM SERPL-MCNC: 1.8 MG/DL — SIGNIFICANT CHANGE UP (ref 1.8–2.4)
MCHC RBC-ENTMCNC: 28.1 PG — SIGNIFICANT CHANGE UP (ref 27–31)
MCHC RBC-ENTMCNC: 28.1 PG — SIGNIFICANT CHANGE UP (ref 27–31)
MCHC RBC-ENTMCNC: 33.2 G/DL — SIGNIFICANT CHANGE UP (ref 32–37)
MCHC RBC-ENTMCNC: 33.7 G/DL — SIGNIFICANT CHANGE UP (ref 32–37)
MCV RBC AUTO: 83.6 FL — SIGNIFICANT CHANGE UP (ref 81–99)
MCV RBC AUTO: 84.6 FL — SIGNIFICANT CHANGE UP (ref 81–99)
MONOCYTES # BLD AUTO: 0.8 K/UL — HIGH (ref 0.1–0.6)
MONOCYTES # BLD AUTO: 0.94 K/UL — HIGH (ref 0.1–0.6)
MONOCYTES NFR BLD AUTO: 6.7 % — SIGNIFICANT CHANGE UP (ref 1.7–9.3)
MONOCYTES NFR BLD AUTO: 7.2 % — SIGNIFICANT CHANGE UP (ref 1.7–9.3)
NEUTROPHILS # BLD AUTO: 10.05 K/UL — HIGH (ref 1.4–6.5)
NEUTROPHILS # BLD AUTO: 7.72 K/UL — HIGH (ref 1.4–6.5)
NEUTROPHILS NFR BLD AUTO: 69.7 % — SIGNIFICANT CHANGE UP (ref 42.2–75.2)
NEUTROPHILS NFR BLD AUTO: 71.8 % — SIGNIFICANT CHANGE UP (ref 42.2–75.2)
NRBC BLD AUTO-RTO: 0 /100 WBCS — SIGNIFICANT CHANGE UP (ref 0–0)
NRBC BLD AUTO-RTO: 0 /100 WBCS — SIGNIFICANT CHANGE UP (ref 0–0)
PHOSPHATE SERPL-MCNC: 2.8 MG/DL — SIGNIFICANT CHANGE UP (ref 2.1–4.9)
PLATELET # BLD AUTO: 213 K/UL — SIGNIFICANT CHANGE UP (ref 130–400)
PLATELET # BLD AUTO: 264 K/UL — SIGNIFICANT CHANGE UP (ref 130–400)
PMV BLD: 10.7 FL — HIGH (ref 7.4–10.4)
PMV BLD: 11.3 FL — HIGH (ref 7.4–10.4)
POTASSIUM SERPL-MCNC: 2.8 MMOL/L — LOW (ref 3.5–5)
POTASSIUM SERPL-MCNC: 3.5 MMOL/L — SIGNIFICANT CHANGE UP (ref 3.5–5)
POTASSIUM SERPL-MCNC: 3.6 MMOL/L — SIGNIFICANT CHANGE UP (ref 3.5–5)
POTASSIUM SERPL-SCNC: 2.8 MMOL/L — LOW (ref 3.5–5)
POTASSIUM SERPL-SCNC: 3.5 MMOL/L — SIGNIFICANT CHANGE UP (ref 3.5–5)
POTASSIUM SERPL-SCNC: 3.6 MMOL/L — SIGNIFICANT CHANGE UP (ref 3.5–5)
PROT SERPL-MCNC: 5.8 G/DL — LOW (ref 6–8)
PROT SERPL-MCNC: 6.3 G/DL — SIGNIFICANT CHANGE UP (ref 6–8)
RBC # BLD: 4.48 M/UL — SIGNIFICANT CHANGE UP (ref 4.2–5.4)
RBC # BLD: 4.69 M/UL — SIGNIFICANT CHANGE UP (ref 4.2–5.4)
RBC # FLD: 14.6 % — HIGH (ref 11.5–14.5)
RBC # FLD: 14.6 % — HIGH (ref 11.5–14.5)
SODIUM SERPL-SCNC: 141 MMOL/L — SIGNIFICANT CHANGE UP (ref 135–146)
WBC # BLD: 11.08 K/UL — HIGH (ref 4.8–10.8)
WBC # BLD: 14 K/UL — HIGH (ref 4.8–10.8)
WBC # FLD AUTO: 11.08 K/UL — HIGH (ref 4.8–10.8)
WBC # FLD AUTO: 14 K/UL — HIGH (ref 4.8–10.8)

## 2025-04-10 PROCEDURE — 99223 1ST HOSP IP/OBS HIGH 75: CPT

## 2025-04-10 PROCEDURE — 92523 SPEECH SOUND LANG COMPREHEN: CPT | Mod: GN

## 2025-04-10 PROCEDURE — 87641 MR-STAPH DNA AMP PROBE: CPT

## 2025-04-10 PROCEDURE — 87640 STAPH A DNA AMP PROBE: CPT

## 2025-04-10 PROCEDURE — 74018 RADEX ABDOMEN 1 VIEW: CPT

## 2025-04-10 PROCEDURE — 76536 US EXAM OF HEAD AND NECK: CPT

## 2025-04-10 PROCEDURE — 92610 EVALUATE SWALLOWING FUNCTION: CPT | Mod: GN

## 2025-04-10 PROCEDURE — 82962 GLUCOSE BLOOD TEST: CPT

## 2025-04-10 PROCEDURE — 84300 ASSAY OF URINE SODIUM: CPT

## 2025-04-10 PROCEDURE — 82652 VIT D 1 25-DIHYDROXY: CPT

## 2025-04-10 PROCEDURE — 82306 VITAMIN D 25 HYDROXY: CPT

## 2025-04-10 PROCEDURE — 83036 HEMOGLOBIN GLYCOSYLATED A1C: CPT

## 2025-04-10 PROCEDURE — 80053 COMPREHEN METABOLIC PANEL: CPT

## 2025-04-10 PROCEDURE — 85025 COMPLETE CBC W/AUTO DIFF WBC: CPT

## 2025-04-10 PROCEDURE — 87507 IADNA-DNA/RNA PROBE TQ 12-25: CPT

## 2025-04-10 PROCEDURE — 93010 ELECTROCARDIOGRAM REPORT: CPT

## 2025-04-10 PROCEDURE — 84133 ASSAY OF URINE POTASSIUM: CPT

## 2025-04-10 PROCEDURE — 80048 BASIC METABOLIC PNL TOTAL CA: CPT

## 2025-04-10 PROCEDURE — 93005 ELECTROCARDIOGRAM TRACING: CPT

## 2025-04-10 PROCEDURE — 82310 ASSAY OF CALCIUM: CPT

## 2025-04-10 PROCEDURE — 84439 ASSAY OF FREE THYROXINE: CPT

## 2025-04-10 PROCEDURE — 83519 RIA NONANTIBODY: CPT

## 2025-04-10 PROCEDURE — 84443 ASSAY THYROID STIM HORMONE: CPT

## 2025-04-10 PROCEDURE — 84100 ASSAY OF PHOSPHORUS: CPT

## 2025-04-10 PROCEDURE — 83735 ASSAY OF MAGNESIUM: CPT

## 2025-04-10 PROCEDURE — 82436 ASSAY OF URINE CHLORIDE: CPT

## 2025-04-10 PROCEDURE — 92526 ORAL FUNCTION THERAPY: CPT | Mod: GN

## 2025-04-10 PROCEDURE — 36415 COLL VENOUS BLD VENIPUNCTURE: CPT

## 2025-04-10 PROCEDURE — 83970 ASSAY OF PARATHORMONE: CPT

## 2025-04-10 RX ORDER — LOSARTAN POTASSIUM 100 MG/1
100 TABLET, FILM COATED ORAL DAILY
Refills: 0 | Status: DISCONTINUED | OUTPATIENT
Start: 2025-04-10 | End: 2025-04-18

## 2025-04-10 RX ORDER — GLUCAGON 3 MG/1
1 POWDER NASAL ONCE
Refills: 0 | Status: DISCONTINUED | OUTPATIENT
Start: 2025-04-10 | End: 2025-04-18

## 2025-04-10 RX ORDER — RIVAROXABAN 10 MG/1
20 TABLET, FILM COATED ORAL
Refills: 0 | Status: DISCONTINUED | OUTPATIENT
Start: 2025-04-10 | End: 2025-04-18

## 2025-04-10 RX ORDER — SODIUM CHLORIDE 9 G/1000ML
1000 INJECTION, SOLUTION INTRAVENOUS
Refills: 0 | Status: DISCONTINUED | OUTPATIENT
Start: 2025-04-10 | End: 2025-04-11

## 2025-04-10 RX ORDER — INSULIN LISPRO 100 U/ML
INJECTION, SOLUTION INTRAVENOUS; SUBCUTANEOUS
Refills: 0 | Status: DISCONTINUED | OUTPATIENT
Start: 2025-04-10 | End: 2025-04-18

## 2025-04-10 RX ORDER — ALBUTEROL SULFATE 2.5 MG/3ML
2 VIAL, NEBULIZER (ML) INHALATION EVERY 6 HOURS
Refills: 0 | Status: DISCONTINUED | OUTPATIENT
Start: 2025-04-10 | End: 2025-04-18

## 2025-04-10 RX ORDER — DEXTROSE 50 % IN WATER 50 %
12.5 SYRINGE (ML) INTRAVENOUS ONCE
Refills: 0 | Status: DISCONTINUED | OUTPATIENT
Start: 2025-04-10 | End: 2025-04-18

## 2025-04-10 RX ORDER — DEXTROSE 50 % IN WATER 50 %
25 SYRINGE (ML) INTRAVENOUS ONCE
Refills: 0 | Status: DISCONTINUED | OUTPATIENT
Start: 2025-04-10 | End: 2025-04-18

## 2025-04-10 RX ORDER — MELATONIN 5 MG
3 TABLET ORAL AT BEDTIME
Refills: 0 | Status: DISCONTINUED | OUTPATIENT
Start: 2025-04-10 | End: 2025-04-18

## 2025-04-10 RX ORDER — MAGNESIUM SULFATE 500 MG/ML
2 SYRINGE (ML) INJECTION
Refills: 0 | Status: DISCONTINUED | OUTPATIENT
Start: 2025-04-10 | End: 2025-04-15

## 2025-04-10 RX ORDER — LOSARTAN POTASSIUM 100 MG/1
1 TABLET, FILM COATED ORAL
Refills: 0 | DISCHARGE

## 2025-04-10 RX ORDER — DEXTROSE 50 % IN WATER 50 %
15 SYRINGE (ML) INTRAVENOUS ONCE
Refills: 0 | Status: DISCONTINUED | OUTPATIENT
Start: 2025-04-10 | End: 2025-04-18

## 2025-04-10 RX ORDER — SODIUM CHLORIDE 9 G/1000ML
1000 INJECTION, SOLUTION INTRAVENOUS
Refills: 0 | Status: DISCONTINUED | OUTPATIENT
Start: 2025-04-10 | End: 2025-04-10

## 2025-04-10 RX ORDER — SODIUM CHLORIDE 9 G/1000ML
1000 INJECTION, SOLUTION INTRAVENOUS
Refills: 0 | Status: DISCONTINUED | OUTPATIENT
Start: 2025-04-10 | End: 2025-04-18

## 2025-04-10 RX ORDER — MAGNESIUM SULFATE 500 MG/ML
2 SYRINGE (ML) INJECTION ONCE
Refills: 0 | Status: COMPLETED | OUTPATIENT
Start: 2025-04-10 | End: 2025-04-10

## 2025-04-10 RX ADMIN — Medication 25 GRAM(S): at 10:08

## 2025-04-10 RX ADMIN — RIVAROXABAN 20 MILLIGRAM(S): 10 TABLET, FILM COATED ORAL at 17:42

## 2025-04-10 RX ADMIN — Medication 50 MILLIEQUIVALENT(S): at 05:24

## 2025-04-10 RX ADMIN — Medication 25 GRAM(S): at 03:09

## 2025-04-10 RX ADMIN — Medication 50 MILLIEQUIVALENT(S): at 07:22

## 2025-04-10 RX ADMIN — Medication 50 MILLIEQUIVALENT(S): at 03:50

## 2025-04-10 RX ADMIN — SODIUM CHLORIDE 1000 MILLILITER(S): 9 INJECTION, SOLUTION INTRAVENOUS at 03:09

## 2025-04-10 NOTE — H&P ADULT - HISTORY OF PRESENT ILLNESS
75 yo F with PMHx Afib on xarelto, DM2, HLD, HTN, congential solitary kidney, recent ICU admission in 11/2024 for septic shock 2/2 ESBL proteus bacteremia s/p trach/PEJ now on trach collar @6LPM presenting from NH for abnormal lab values. Patient is awake, alert, responds to questions, follows commands. She feels fine with no acute complaints. Per NH paperwork patient was sent in for Ca 12.6 and needs midline. Daughter at bedside confirmed and stated they wanted to give her IVF but had no vascular access.   Patient denies fevers, chills, chest pain, SOB, abdominal pain, n/v/d/c, dysuria.    Vitals in the ED: /72, HR 69, T 98.4, RR 18, SpO2 99% on  T collar 6L   EKG: Afib HR 71, with PVCs  Labs: WBC 14, Hgb 13.2, K 2.8, Ca 13.1 (Corrected 12.8), Mg 1.4     In the ED, patient was given LR 1L bolus, Mg 2g, K 60mEq.  73 yo F with PMHx Afib on xarelto, DM2, HLD, HTN, congential solitary kidney, recent ICU admission in 11/2024 for septic shock 2/2 ESBL proteus bacteremia s/p trach/PEJ now on trach collar @6LPM presenting from [facility] for abnormal lab values. Patient is awake, alert, responds to questions, follows commands. She feels fine with no acute complaints. Per NH paperwork patient was sent in for Ca 12.6 and needs midline. Daughter at bedside confirmed and stated they wanted to give her IVF but had no vascular access.   Patient denies fevers, chills, chest pain, SOB, abdominal pain, n/v/d/c, dysuria.    Vitals in the ED: /72, HR 69, T 98.4, RR 18, SpO2 99% on  T collar 6L   EKG: Afib HR 71, with PVCs  Labs: WBC 14, Hgb 13.2, K 2.8, Ca 13.1 (Corrected 12.8), Mg 1.4     In the ED, patient was given LR 1L bolus, Mg 2g, K 60mEq.

## 2025-04-10 NOTE — H&P ADULT - NSHPPHYSICALEXAM_GEN_ALL_CORE
T(C): 36.9 (04-09-25 @ 22:14), Max: 36.9 (04-09-25 @ 22:14)  HR: 69 (04-09-25 @ 22:14) (69 - 69)  BP: 147/74 (04-09-25 @ 23:15) (4/72 - 147/74)  RR: 18 (04-09-25 @ 22:14) (18 - 18)  SpO2: 99% (04-09-25 @ 22:14) (99% - 99%)    CONSTITUTIONAL: Well groomed, no apparent distress  RESP: No respiratory distress, no use of accessory muscles; CTA b/l, no WRR  CV: RRR, +S1S2, no MRG; no JVD; no peripheral edema  GI: Soft, NT, ND, no rebound, no guarding; no palpable masses; no hepatosplenomegaly  SKIN: No rashes or ulcers noted  NEURO: A+O x 3 T(C): 36.9 (04-09-25 @ 22:14), Max: 36.9 (04-09-25 @ 22:14)  HR: 69 (04-09-25 @ 22:14) (69 - 69)  BP: 147/74 (04-09-25 @ 23:15) (4/72 - 147/74)  RR: 18 (04-09-25 @ 22:14) (18 - 18)  SpO2: 99% (04-09-25 @ 22:14) (99% - 99%)    CONSTITUTIONAL: Well groomed, no apparent distress  RESP: No respiratory distress, no use of accessory muscles; CTA b/l, no WRR  CV: RRR, +S1S2, no MRG; no JVD; no peripheral edema  GI: Soft, NT, ND, no rebound, no guarding; no palpable masses, PEJ tube  SKIN: No rashes   NEURO: Awake and alert T(C): 36.9 (04-09-25 @ 22:14), Max: 36.9 (04-09-25 @ 22:14)  HR: 69 (04-09-25 @ 22:14) (69 - 69)  BP: 147/74 (04-09-25 @ 23:15) (4/72 - 147/74)  RR: 18 (04-09-25 @ 22:14) (18 - 18)  SpO2: 99% (04-09-25 @ 22:14) (99% - 99%)    CONSTITUTIONAL: Well groomed, no apparent distress  RESP: No respiratory distress, no use of accessory muscles; CTA b/l, no WRR  CV: RRR, +S1S2, no MRG; no JVD; no peripheral edema  GI: Soft, NT, ND, no rebound, no guarding; no palpable masses, PEJ tube  SKIN: No rashes   NEURO: Awake and alert    Home Medications:  albuterol 90 mcg/inh inhalation aerosol: 2 puff(s) inhaled every 6 hours As needed Shortness of Breath and/or Wheezing (10 Apr 2025 09:35)  alendronate 70 mg oral tablet: 1 tab(s) orally once a week (10 Apr 2025 09:35)  cholecalciferol oral tablet: 400 unit(s) orally once a day (10 Apr 2025 09:35)  loperamide 2 mg oral capsule: 1 cap(s) orally every 12 hours as needed for  diarrhea stop after 7 days (10 Apr 2025 09:35)  losartan 100 mg oral tablet: 1 tab(s) orally once a day (10 Apr 2025 09:34)  magnesium hydroxide 400 mg oral tablet, chewable: orally once a day (10 Apr 2025 09:35)  Multiple Vitamins with Minerals oral liquid: 10 milliliter(s) by jejunostomy tube once a day (10 Apr 2025 09:35)  Potassium Chloride (Eqv-Klor-Con M20) 20 mEq oral tablet, extended release: 1 tab(s) orally once a day (10 Apr 2025 09:35)  rivaroxaban 15 mg oral tablet: 1 tab(s) orally once a day (10 Apr 2025 09:35)

## 2025-04-10 NOTE — ED PROVIDER NOTE - PHYSICAL EXAMINATION
Gen: NAD, awake and alert, follows commands   HEENT: normal conjunctiva, trach c/d/i  Lung: CTAB, no respiratory distress  CV: RRR, no murmurs, rubs or gallops  Abd: soft, NT, ND  Neuro/MSK: no gross focal deficits  Extremities: no lower extremity edema bilaterally, cap refill <2s  Skin: Warm, well perfused

## 2025-04-10 NOTE — ED PROVIDER NOTE - CLINICAL SUMMARY MEDICAL DECISION MAKING FREE TEXT BOX
Patient evaluated for midline placement, able to place peripheral IV, labs send and electrolyte abnormalities noted, EKG reviewed and patient admitted for electrolyte repletion and monitoring

## 2025-04-10 NOTE — H&P ADULT - NSHPLABSRESULTS_GEN_ALL_CORE
LABS:                        13.2   14.00 )-----------( 264      ( 10 Apr 2025 01:44 )             39.2     04-10    141  |  109  |  18  ----------------------------<  100[H]  2.8[L]   |  18  |  0.8    Ca    13.1[H]      10 Apr 2025 01:44  Phos  2.8     04-10  Mg     1.4     04-10    TPro  6.3  /  Alb  3.9  /  TBili  1.8[H]  /  DBili  x   /  AST  22  /  ALT  34  /  AlkPhos  114  04-10

## 2025-04-10 NOTE — H&P ADULT - ASSESSMENT
75 yo F with PMHx Afib on xarelto, DM2, HLD, HTN, congential solitary kidney, recent ICU admission in 11/2024 for septic shock 2/2 ESBL proteus bacteremia s/p trach/PEJ now on trach collar @6LPM presenting from NH for abnormal lab values. Patient is awake, alert, responds to questions, follows commands. She feels fine with no acute complaints. Per NH paperwork patient was sent in for Ca 12.6 and needs midline. Daughter at bedside confirmed and stated they wanted to give her IVF but had no vascular access.     #Hypokalemia  - K 2.8 on admission   - EKG: Afib HR 71, with PVCs  - s/p K 20mEq IV x3 in ED   - Follow up repeat BMP    #Hypomagnesemia  - Mg 1.4 on admission   - s/p Mg 2g in ED  - Follow up repeat Mg    #Hypercalcemia   - Ca 13.1 (Corrected 12.8) on admission   - s/p 1L LR bolus in ED       #Afib   - on Xarelto   - Last TTE 11/05/24: EF 71%, hyperdynamic, LA enlargement, fibrocalcific aortic valve w/ moderate AS, trivial pericardial effusion  - Monitor on telemetry     #DM  - ISS for now, target -180  - Monitor FS and adjust accordingly     #HTN  - C/w amlodipine 10mg qd PO and losartan 50mg qd PO home meds     #HLD     #Critical illness 11/2024 s/p Trach + PEJ   #Congenital solitary kidney  - Continue PEJ feeds   - Trach care         #DVT ppx: Xarelto  #GI ppx: PPI  #Diet: Tube feeds  #Activity: IAT  #Dispo: From NH, admit to telemetry   #Medrec   73 yo F with PMHx Afib on xarelto, DM2, HLD, HTN, congential solitary kidney, recent ICU admission in 11/2024 for septic shock 2/2 ESBL proteus bacteremia s/p trach/PEJ now on trach collar @6LPM presenting from NH for abnormal lab values. Patient is awake, alert, responds to questions, follows commands. She feels fine with no acute complaints. Per NH paperwork patient was sent in for Ca 12.6 and needs midline. Daughter at bedside confirmed and stated they wanted to give her IVF but had no vascular access.     #Hypokalemia  - K 2.8 on admission   - EKG: Afib HR 71, with PVCs  - s/p K 20mEq IV x3 in ED   - Follow up repeat BMP  - Monitor tele for events    #Hypomagnesemia  - Mg 1.4 on admission   - s/p Mg 2g in ED  - Follow up repeat Mg    #Moderate hypercalcemia   - Ca 13.1 (Corrected 12.8) on admission   - s/p 1L LR bolus in ED   - Check PTH, Vit D, urine Ca  - Continue IVF LR 75cc/h  - Follow up repeat CMP     #Afib   - on Xarelto   - Last TTE 11/05/24: EF 71%, hyperdynamic, LA enlargement, fibrocalcific aortic valve w/ moderate AS, trivial pericardial effusion  - Monitor on telemetry     #DM  - ISS for now, target -180  - Monitor FS and adjust accordingly     #HTN  - C/w amlodipine 10mg qd PO and losartan 50mg qd PO home meds     #HLD     #Critical illness 11/2024 s/p Trach + PEJ   #Congenital solitary kidney  - Continue PEJ feeds   - Trach care         #DVT ppx: Xarelto  #GI ppx: PPI  #Diet: Tube feeds, nutrition cs  #Activity: IAT  #Dispo: From [facility], admit to telemetry   #Medrec   73 yo F with PMHx Afib on Xarelto, DM2, HTN, congential solitary kidney, recent ICU admission in 11/2024 for septic shock 2/2 ESBL proteus bacteremia s/p trach/PEJ now on trach collar @6LPM presenting from NH for abnormal lab values. Patient is awake, alert, responds to questions, follows commands. She feels fine with no acute complaints. Per NH paperwork patient was sent in for Ca 12.6 and needs midline. Daughter at bedside confirmed and stated they wanted to give her IVF but had no vascular access.     #Hypokalemia  - K 2.8 on admission   - EKG: Afib HR 71, with PVCs  - s/p K 20mEq IV x3 in ED   - Follow up repeat BMP  - Monitor tele for events    #Hypomagnesemia  - Mg 1.4 on admission   - s/p Mg 2g in ED  - Follow up repeat Mg    #Moderate hypercalcemia   - Ca 13.1 (Corrected 12.8) on admission   - s/p 1L LR bolus in ED   - Check PTH, Vit D, urine Ca  - Continue IVF LR 100cc/h  - Follow up repeat CMP     #Afib   - on Xarelto   - Last TTE 11/05/24: EF 71%, hyperdynamic, LA enlargement, fibrocalcific aortic valve w/ moderate AS, trivial pericardial effusion  - Monitor on telemetry     #DM  - ISS for now, target -180  - Monitor FS and adjust accordingly     #HTN  - C/w home losartan 100mg    #Critical illness 11/2024 s/p Trach + PEJ   #Congenital solitary kidney  - Continue PEJ feeds   - Trach care         #DVT ppx: Xarelto  #GI ppx: None  #Diet: Tube feeds, nutrition cs  #Activity: IAT  #Dispo: From [facility], admit to telemetry   #Medrec confirmed with [place]   73 yo F with PMHx Afib on Xarelto, DM2, HTN, congential solitary kidney, recent ICU admission in 11/2024 for septic shock 2/2 ESBL proteus bacteremia s/p trach/PEJ now on trach collar @6LPM presenting from NH for abnormal lab values. Patient is awake, alert, responds to questions, follows commands. She feels fine with no acute complaints. Per NH paperwork patient was sent in for Ca 12.6 and needs midline. Daughter at bedside confirmed and stated they wanted to give her IVF but had no vascular access.     #Hypokalemia  - K 2.8 on admission   - EKG: Afib HR 71, with PVCs  - s/p K 20mEq IV x3 in ED   - Follow up repeat BMP  - Monitor tele for events    #Magnessium deficiency   - Mg 1.4 on admission   - s/p Mg 2g in ED  - Follow up repeat Mg    #Moderate hypercalcemia   - Ca 13.1 (Corrected 12.8) on admission   - s/p 1L LR bolus in ED   - Check PTH, Vit D, urine Ca  - Continue IVF LR 100cc/h  - Follow up repeat CMP   - pt is on MV and  cholecalciferol at NH, hold for now       #chronic Afib   - on Xarelto   - Last TTE 11/05/24: EF 71%, hyperdynamic, LA enlargement, fibrocalcific aortic valve w/ moderate AS, trivial pericardial effusion  - Monitor on telemetry     #DM  - ISS for now, target -180  - Monitor FS and adjust accordingly     #HTN  - C/w home losartan 100mg    #Critical illness 11/2024 s/p Trach + PEJ   #Congenital solitary kidney  - Continue PEJ feeds   - Trach care         #DVT ppx: Xarelto  #GI ppx: None  #Diet: Tube feeds, nutrition cs  #Activity: IAT  #Dispo: From [facility], admit to telemetry   #Medrec confirmed with [place]

## 2025-04-10 NOTE — PATIENT PROFILE ADULT - FALL HARM RISK - HARM RISK INTERVENTIONS

## 2025-04-10 NOTE — ED PROVIDER NOTE - NS ED ROS FT
CONSTITUTIONAL: No fevers, no chills  HEENT:  no nasal congestion, no sore throat  CV: No chest pain, no palpitations  RESP: No SOB, no cough  GI: No abdominal pain, no nausea, no vomiting, no diarrhea, no constipation  : No dysuria  MSK: no swelling of extremities  SKIN: No rashes  NEURO: No headache, no focal weakness, no decreased sensation/paresthesias

## 2025-04-10 NOTE — ED PROVIDER NOTE - OBJECTIVE STATEMENT
75 yo F with PMHx Afib on xarelto, DM2, HLD/HTN, congential solitary kidney, recent ICU admission in 11/2024 for septic shock 2/2 ESBL proteus bacteremia s/p trach/PEJ now on trach collar @6LPM presenting from NH for abnormal lab value. Patient is awake, alert, responds to questions, follows commands. She feels fine with no acute complaints. Per NH paperwork patient was sent in for Ca 12.6 and needs midline. Daughter at bedside confirmed and stated they wanted to give her IVF but had no vascular access. Patient denies fevers, chills, chest pain, SOB, abdominal pain, n/v/d/c, dysuria.

## 2025-04-10 NOTE — ED PROVIDER NOTE - CARE PLAN
1 Principal Discharge DX:	Hypokalemia  Secondary Diagnosis:	Hypomagnesemia  Secondary Diagnosis:	Hypercalcemia

## 2025-04-10 NOTE — ED PROVIDER NOTE - ATTENDING CONTRIBUTION TO CARE
74-year-old female with PMH A-fib on Xarelto, history of septic shock, ESBL, trach and PEG, DM, asthma, hypertension sent from [place] for midline placement.  Per history patient with elevated calcium requiring midline and nursing staff unable to place line.  Patient without acute complaints.  Limited historian.      VITAL SIGNS: noted  CONSTITUTIONAL: Chronically ill-appearing, trach collar in place, in no acute distress  HEAD: Normocephalic; atraumatic  EYES: PERRL, EOM intact; conjunctiva and sclera clear  ENT: No nasal discharge; airway clear. MMM  NECK: Supple; non tender.   CARD: S1, S2 normal; no murmurs, gallops, or rubs. Regular rate and rhythm  RESP: CTAB/L, no wheezes, rales or rhonchi  ABD: Normal bowel sounds; soft; non-distended; non-tender  EXT: Normal ROM. No calf tenderness or edema. Distal pulses intact  NEURO: Alert, awake, follows commands; grossly unremarkable. No apparent focal deficits  SKIN: Skin exam is warm and dry

## 2025-04-11 LAB
A1C WITH ESTIMATED AVERAGE GLUCOSE RESULT: 5.1 % — SIGNIFICANT CHANGE UP (ref 4–5.6)
ALBUMIN SERPL ELPH-MCNC: 3.5 G/DL — SIGNIFICANT CHANGE UP (ref 3.5–5.2)
ALP SERPL-CCNC: 93 U/L — SIGNIFICANT CHANGE UP (ref 30–115)
ALT FLD-CCNC: 31 U/L — SIGNIFICANT CHANGE UP (ref 0–41)
ANION GAP SERPL CALC-SCNC: 15 MMOL/L — HIGH (ref 7–14)
AST SERPL-CCNC: 22 U/L — SIGNIFICANT CHANGE UP (ref 0–41)
BASOPHILS # BLD AUTO: 0.08 K/UL — SIGNIFICANT CHANGE UP (ref 0–0.2)
BASOPHILS NFR BLD AUTO: 0.8 % — SIGNIFICANT CHANGE UP (ref 0–1)
BILIRUB SERPL-MCNC: 1.5 MG/DL — HIGH (ref 0.2–1.2)
BUN SERPL-MCNC: 13 MG/DL — SIGNIFICANT CHANGE UP (ref 10–20)
CALCIUM SERPL-MCNC: 12.6 MG/DL — HIGH (ref 8.4–10.5)
CALCIUM SERPL-MCNC: 13 MG/DL — HIGH (ref 8.4–10.5)
CHLORIDE SERPL-SCNC: 114 MMOL/L — HIGH (ref 98–110)
CO2 SERPL-SCNC: 16 MMOL/L — LOW (ref 17–32)
CREAT SERPL-MCNC: 0.6 MG/DL — LOW (ref 0.7–1.5)
EGFR: 95 ML/MIN/1.73M2 — SIGNIFICANT CHANGE UP
EGFR: 95 ML/MIN/1.73M2 — SIGNIFICANT CHANGE UP
EOSINOPHIL # BLD AUTO: 0.25 K/UL — SIGNIFICANT CHANGE UP (ref 0–0.7)
EOSINOPHIL NFR BLD AUTO: 2.6 % — SIGNIFICANT CHANGE UP (ref 0–8)
ESTIMATED AVERAGE GLUCOSE: 100 MG/DL — SIGNIFICANT CHANGE UP (ref 68–114)
GLUCOSE BLDC GLUCOMTR-MCNC: 75 MG/DL — SIGNIFICANT CHANGE UP (ref 70–99)
GLUCOSE BLDC GLUCOMTR-MCNC: 77 MG/DL — SIGNIFICANT CHANGE UP (ref 70–99)
GLUCOSE BLDC GLUCOMTR-MCNC: 80 MG/DL — SIGNIFICANT CHANGE UP (ref 70–99)
GLUCOSE BLDC GLUCOMTR-MCNC: 87 MG/DL — SIGNIFICANT CHANGE UP (ref 70–99)
GLUCOSE SERPL-MCNC: 66 MG/DL — LOW (ref 70–99)
HCT VFR BLD CALC: 37.2 % — SIGNIFICANT CHANGE UP (ref 37–47)
HGB BLD-MCNC: 12 G/DL — SIGNIFICANT CHANGE UP (ref 12–16)
IMM GRANULOCYTES NFR BLD AUTO: 0.4 % — HIGH (ref 0.1–0.3)
LYMPHOCYTES # BLD AUTO: 2.24 K/UL — SIGNIFICANT CHANGE UP (ref 1.2–3.4)
LYMPHOCYTES # BLD AUTO: 23 % — SIGNIFICANT CHANGE UP (ref 20.5–51.1)
MAGNESIUM SERPL-MCNC: 1.7 MG/DL — LOW (ref 1.8–2.4)
MCHC RBC-ENTMCNC: 28 PG — SIGNIFICANT CHANGE UP (ref 27–31)
MCHC RBC-ENTMCNC: 32.3 G/DL — SIGNIFICANT CHANGE UP (ref 32–37)
MCV RBC AUTO: 86.7 FL — SIGNIFICANT CHANGE UP (ref 81–99)
MONOCYTES # BLD AUTO: 0.7 K/UL — HIGH (ref 0.1–0.6)
MONOCYTES NFR BLD AUTO: 7.2 % — SIGNIFICANT CHANGE UP (ref 1.7–9.3)
NEUTROPHILS # BLD AUTO: 6.44 K/UL — SIGNIFICANT CHANGE UP (ref 1.4–6.5)
NEUTROPHILS NFR BLD AUTO: 66 % — SIGNIFICANT CHANGE UP (ref 42.2–75.2)
NRBC BLD AUTO-RTO: 0 /100 WBCS — SIGNIFICANT CHANGE UP (ref 0–0)
PLATELET # BLD AUTO: 176 K/UL — SIGNIFICANT CHANGE UP (ref 130–400)
PMV BLD: 12 FL — HIGH (ref 7.4–10.4)
POTASSIUM SERPL-MCNC: 3.2 MMOL/L — LOW (ref 3.5–5)
POTASSIUM SERPL-SCNC: 3.2 MMOL/L — LOW (ref 3.5–5)
PROT SERPL-MCNC: 5.5 G/DL — LOW (ref 6–8)
PTH-INTACT FLD-MCNC: 132 PG/ML — HIGH (ref 15–65)
RBC # BLD: 4.29 M/UL — SIGNIFICANT CHANGE UP (ref 4.2–5.4)
RBC # FLD: 15.3 % — HIGH (ref 11.5–14.5)
SODIUM SERPL-SCNC: 145 MMOL/L — SIGNIFICANT CHANGE UP (ref 135–146)
VIT D25+D1,25 OH+D1,25 PNL SERPL-MCNC: 55.9 PG/ML — SIGNIFICANT CHANGE UP (ref 19.9–79.3)
WBC # BLD: 9.75 K/UL — SIGNIFICANT CHANGE UP (ref 4.8–10.8)
WBC # FLD AUTO: 9.75 K/UL — SIGNIFICANT CHANGE UP (ref 4.8–10.8)

## 2025-04-11 PROCEDURE — 99232 SBSQ HOSP IP/OBS MODERATE 35: CPT

## 2025-04-11 RX ORDER — MAGNESIUM SULFATE 500 MG/ML
2 SYRINGE (ML) INJECTION ONCE
Refills: 0 | Status: COMPLETED | OUTPATIENT
Start: 2025-04-11 | End: 2025-04-11

## 2025-04-11 RX ORDER — SODIUM CHLORIDE 9 G/1000ML
1000 INJECTION, SOLUTION INTRAVENOUS
Refills: 0 | Status: COMPLETED | OUTPATIENT
Start: 2025-04-11 | End: 2025-04-11

## 2025-04-11 RX ADMIN — RIVAROXABAN 20 MILLIGRAM(S): 10 TABLET, FILM COATED ORAL at 17:10

## 2025-04-11 RX ADMIN — Medication 50 MILLIEQUIVALENT(S): at 14:07

## 2025-04-11 RX ADMIN — LOSARTAN POTASSIUM 100 MILLIGRAM(S): 100 TABLET, FILM COATED ORAL at 05:38

## 2025-04-11 RX ADMIN — Medication 50 MILLIEQUIVALENT(S): at 09:44

## 2025-04-11 RX ADMIN — SODIUM CHLORIDE 100 MILLILITER(S): 9 INJECTION, SOLUTION INTRAVENOUS at 17:10

## 2025-04-11 RX ADMIN — Medication 50 MILLIEQUIVALENT(S): at 12:40

## 2025-04-11 RX ADMIN — Medication 25 GRAM(S): at 09:44

## 2025-04-11 NOTE — DIETITIAN INITIAL EVALUATION ADULT - PERTINENT LABORATORY DATA
04-11    145  |  114[H]  |  13  ----------------------------<  66[L]  3.2[L]   |  16[L]  |  0.6[L]    Ca    13.0[H]      11 Apr 2025 06:04  Phos  2.8     04-10  Mg     1.7     04-11    TPro  5.5[L]  /  Alb  3.5  /  TBili  1.5[H]  /  DBili  x   /  AST  22  /  ALT  31  /  AlkPhos  93  04-11  POCT Blood Glucose.: 77 mg/dL (04-11-25 @ 11:16)  A1C with Estimated Average Glucose Result: 5.1 % (04-11-25 @ 06:04)  A1C with Estimated Average Glucose Result: 4.6 % (12-05-24 @ 07:54)

## 2025-04-11 NOTE — PROGRESS NOTE ADULT - ASSESSMENT
73 yo F with PMHx Afib on Xarelto, DM2, HTN, congential solitary kidney, recent ICU admission in 11/2024 for septic shock 2/2 ESBL proteus bacteremia s/p trach/PEJ now on trach collar @6LPM presenting from NH for abnormal lab values. Patient is awake, alert, responds to questions, follows commands. She feels fine with no acute complaints. Per NH paperwork patient was sent in for Ca 12.6 and needs midline. Daughter at bedside confirmed and stated they wanted to give her IVF but had no vascular access.     #Hypokalemia  - K 2.8 on admission   - EKG: Afib HR 71, with PVCs  - s/p K 20mEq IV x3 in ED   -  repeat BMP, K 3.2 today     # Magnesium deficiency   - Mg 1.4 on admission   - s/p Mg 2g in ED  -  repeat Mg 1.7     #Moderate hypercalcemia   - Ca 13.1 (Corrected 12.8) on admission   - s/p 1L LR bolus in ED   - Check PTH, Vit D, urine Ca  - Continue IVF LR 100cc/hr    - pt is on MV and  cholecalciferol at NH, hold for now     #chronic Afib   - on Xarelto   - Last TTE 11/05/24: EF 71%, hyperdynamic, LA enlargement, fibrocalcific aortic valve w/ moderate AS, trivial pericardial effusion  - off telemetry now     #DM  - ISS for now, target -180  - Monitor FS and adjust accordingly     #HTN  - C/w home losartan 100mg    #Critical illness 11/2024 s/p Trach + PEJ   #Congenital solitary kidney  - Continue PEJ feeds   - Trach care       #DVT ppx: Xarelto  #GI ppx: None  #Diet: Tube feeds, nutrition cs  #Activity: IAT  #Dispo: From [facility],     Pending: Electrolyte imbalance   Dispo: [facility]

## 2025-04-11 NOTE — DIETITIAN INITIAL EVALUATION ADULT - ENTERAL
While Pending SLP eval, initiate EN regimen to provide supplemental nutrition - TF via PEJ with Jevity 1.2 @ 375 mL Q6H to provide 1500 mL TV, 1800 kcal, 83 g protein, 1211 mL free water daily.  ADD 1 pckt [employer] daily - provides additional 90 kcal, 15 g protein  TF meets >85% and <105% estimated needs.   FREE WATER FLUSHES 50 mL pre/post feeds or per MD recs -- provides additional 400 mL free water, total 1610 mL free water daily.

## 2025-04-11 NOTE — DIETITIAN INITIAL EVALUATION ADULT - PERTINENT MEDS FT
MEDICATIONS  (STANDING):  dextrose 5%. 1000 milliLiter(s) (50 mL/Hr) IV Continuous <Continuous>  dextrose 5%. 1000 milliLiter(s) (100 mL/Hr) IV Continuous <Continuous>  dextrose 50% Injectable 25 Gram(s) IV Push once  dextrose 50% Injectable 12.5 Gram(s) IV Push once  dextrose 50% Injectable 25 Gram(s) IV Push once  glucagon  Injectable 1 milliGRAM(s) IntraMuscular once  insulin lispro (ADMELOG) corrective regimen sliding scale   SubCutaneous three times a day before meals  lactated ringers. 1000 milliLiter(s) (100 mL/Hr) IV Continuous <Continuous>  losartan 100 milliGRAM(s) Oral daily  magnesium sulfate  IVPB 2 Gram(s) IV Intermittent every 2 hours  potassium chloride  20 mEq/100 mL IVPB 20 milliEquivalent(s) IV Intermittent every 2 hours  rivaroxaban 20 milliGRAM(s) Oral with dinner    MEDICATIONS  (PRN):  albuterol    90 MICROgram(s) HFA Inhaler 2 Puff(s) Inhalation every 6 hours PRN Shortness of Breath and/or Wheezing  dextrose Oral Gel 15 Gram(s) Oral once PRN Blood Glucose LESS THAN 70 milliGRAM(s)/deciliter  melatonin 3 milliGRAM(s) Oral at bedtime PRN Insomnia

## 2025-04-11 NOTE — DIETITIAN INITIAL EVALUATION ADULT - ADD RECOMMEND
Nutrition Intervention: Enteral Nutrition, Coordination of Care  RD to monitor SLP recs, diet advancement, EN regimen, PO diet initiation, GI function, Nutrition Labs, Electrolytes, NFPF, Weights    RD to follow at HIGH nutrition risk.

## 2025-04-11 NOTE — SPEAKING VALVE EVALUATION - SLP PERTINENT HISTORY OF CURRENT PROBLEM
Pt is a 73 y/o F w/ PMHx: Afib (on Xarelto), DM2, HLD, HTN, congential solitary kidney, recent ICU admission in 11/2024 for septic shock 2' ESBL proteus bacteremia s/p trach/PEJ now on trach collar @6LPM,  presenting from [facility] for abnormal lab values. Pt is being treated for hypokalemia, magnesium deficiency, moderate hypercalcemia.

## 2025-04-11 NOTE — SPEAKING VALVE EVALUATION - RECOMMENDED SPEAKING VALVE GUIDELINES
Placement of speaking valve as tolerated/During waking hours only/Suction prior to placement/Level of supervision

## 2025-04-11 NOTE — DIETITIAN INITIAL EVALUATION ADULT - OTHER INFO
75 yo F with PMHx Afib on Xarelto, DM2, HTN, congential solitary kidney, recent ICU admission in 11/2024 for septic shock 2/2 ESBL proteus bacteremia s/p trach/PEJ now on trach collar @6LPM presenting from NH for abnormal lab values. Patient is awake, alert, responds to questions,

## 2025-04-11 NOTE — PROGRESS NOTE ADULT - SUBJECTIVE AND OBJECTIVE BOX
PATRICIA SPRAGUE  74y  Female      Patient is a 74y old  Female who presents with a chief complaint of Abnormal labs.     INTERVAL HPI/OVERNIGHT EVENTS:      ******************************* REVIEW OF SYSTEMS:**********************************************    All other review of systems negative    *********************** VITALS ******************************************    T(F): 97.3 (04-11-25 @ 13:44)  HR: 54 (04-11-25 @ 13:44) (54 - 63)  BP: 166/95 (04-11-25 @ 13:44) (146/78 - 166/95)  RR: 18 (04-11-25 @ 13:44) (17 - 18)  SpO2: 100% (04-11-25 @ 13:44) (98% - 100%)    04-10-25 @ 07:01  -  04-11-25 @ 07:00  --------------------------------------------------------  IN: 0 mL / OUT: 500 mL / NET: -500 mL            04-10-25 @ 07:01  -  04-11-25 @ 07:00  --------------------------------------------------------  IN: 0 mL / OUT: 500 mL / NET: -500 mL        ******************************** PHYSICAL EXAM:**************************************************  GENERAL: NAD    PSYCH: no agitation, ? baseline mentation  HEENT: trach collar     NERVOUS SYSTEM:  Alert & awake X2, but confused    PULMONARY: BRITTANI, CTA    CARDIOVASCULAR: S1S2 RRR    GI: Soft, NT, ND; BS present.    EXTREMITIES:  2+ Peripheral Pulses, No clubbing, cyanosis, or edema    LYMPH: No lymphadenopathy noted    SKIN: No rashes or lesions      **************************** LABS *******************************************************                          12.0   9.75  )-----------( 176      ( 11 Apr 2025 06:04 )             37.2     04-11    145  |  114[H]  |  13  ----------------------------<  66[L]  3.2[L]   |  16[L]  |  0.6[L]    Ca    13.0[H]      11 Apr 2025 06:04  Phos  2.8     04-10  Mg     1.7     04-11    TPro  5.5[L]  /  Alb  3.5  /  TBili  1.5[H]  /  DBili  x   /  AST  22  /  ALT  31  /  AlkPhos  93  04-11      Urinalysis Basic - ( 11 Apr 2025 06:04 )    Color: x / Appearance: x / SG: x / pH: x  Gluc: 66 mg/dL / Ketone: x  / Bili: x / Urobili: x   Blood: x / Protein: x / Nitrite: x   Leuk Esterase: x / RBC: x / WBC x   Sq Epi: x / Non Sq Epi: x / Bacteria: x        Lactate Trend        CAPILLARY BLOOD GLUCOSE      POCT Blood Glucose.: 77 mg/dL (11 Apr 2025 11:16)          **************************Active Medications *******************************************  No Known Allergies      albuterol    90 MICROgram(s) HFA Inhaler 2 Puff(s) Inhalation every 6 hours PRN  dextrose 5%. 1000 milliLiter(s) IV Continuous <Continuous>  dextrose 5%. 1000 milliLiter(s) IV Continuous <Continuous>  dextrose 50% Injectable 25 Gram(s) IV Push once  dextrose 50% Injectable 12.5 Gram(s) IV Push once  dextrose 50% Injectable 25 Gram(s) IV Push once  dextrose Oral Gel 15 Gram(s) Oral once PRN  glucagon  Injectable 1 milliGRAM(s) IntraMuscular once  insulin lispro (ADMELOG) corrective regimen sliding scale   SubCutaneous three times a day before meals  lactated ringers. 1000 milliLiter(s) IV Continuous <Continuous>  losartan 100 milliGRAM(s) Oral daily  magnesium sulfate  IVPB 2 Gram(s) IV Intermittent every 2 hours  melatonin 3 milliGRAM(s) Oral at bedtime PRN  potassium chloride  20 mEq/100 mL IVPB 20 milliEquivalent(s) IV Intermittent every 2 hours  rivaroxaban 20 milliGRAM(s) Oral with dinner      ***************************************************  RADIOLOGY & ADDITIONAL TESTS:    Imaging Personally Reviewed:  [ ] YES  [ ] NO    HEALTH ISSUES - PROBLEM Dx:

## 2025-04-11 NOTE — DIETITIAN INITIAL EVALUATION ADULT - OTHER CALCULATIONS
WEIGHT USED: 90.2 kg (dosing wt/UBW previous admission 11/20/24)  ENERGY: 5208-6559 kcal/day (MSJ 1433 x 1.1-1.3)  PROTEIN:  g/day (1-1.2 g/kg)  FLUID: 1 mL/kcal  -- Estimated needs with consideration for Age, Weight, BMI, Clinical Condition

## 2025-04-11 NOTE — DIETITIAN INITIAL EVALUATION ADULT - ORAL INTAKE PTA/DIET HISTORY
Attempted to call daughter via telephone with no success. Contacted ASHA Rahman at [facility] for nutrition hx: Patient was discharged from Hospital on [place] Peptide 1.5 - Patient was refusing administration of feeds, protein supplementations and water flushes through PEJ. Had an MBSS at [facility] on 2/3 with recs for pureed and moderately thickened liquids. On 2/25 SLP started therapeutic feeds with pureed and honey thick liquids. Tube feeds were able to be discontinued on 3/6 after calorie count and confirmation that PO intake was adequate.   -- PATIENT WITH NO HEIGHT OR WEIGHT THIS ADMISSION; Will utilize most recent height 167.6 cm   -- BED SCALE NOT WORKING; UBW per chart 90.2 kg (11/20/2024) -- will utilize for calculations at this time until we are able to confirm weight and obtain further weight history.

## 2025-04-12 LAB
ALBUMIN SERPL ELPH-MCNC: 3.4 G/DL — LOW (ref 3.5–5.2)
ALP SERPL-CCNC: 93 U/L — SIGNIFICANT CHANGE UP (ref 30–115)
ALT FLD-CCNC: 28 U/L — SIGNIFICANT CHANGE UP (ref 0–41)
ANION GAP SERPL CALC-SCNC: 13 MMOL/L — SIGNIFICANT CHANGE UP (ref 7–14)
AST SERPL-CCNC: 30 U/L — SIGNIFICANT CHANGE UP (ref 0–41)
BASOPHILS # BLD AUTO: 0.05 K/UL — SIGNIFICANT CHANGE UP (ref 0–0.2)
BASOPHILS NFR BLD AUTO: 0.6 % — SIGNIFICANT CHANGE UP (ref 0–1)
BILIRUB SERPL-MCNC: 1.4 MG/DL — HIGH (ref 0.2–1.2)
BUN SERPL-MCNC: 8 MG/DL — LOW (ref 10–20)
CALCIUM SERPL-MCNC: 12.7 MG/DL — HIGH (ref 8.4–10.5)
CHLORIDE SERPL-SCNC: 116 MMOL/L — HIGH (ref 98–110)
CO2 SERPL-SCNC: 17 MMOL/L — SIGNIFICANT CHANGE UP (ref 17–32)
CREAT SERPL-MCNC: 0.5 MG/DL — LOW (ref 0.7–1.5)
EGFR: 100 ML/MIN/1.73M2 — SIGNIFICANT CHANGE UP
EGFR: 100 ML/MIN/1.73M2 — SIGNIFICANT CHANGE UP
EOSINOPHIL # BLD AUTO: 0.28 K/UL — SIGNIFICANT CHANGE UP (ref 0–0.7)
EOSINOPHIL NFR BLD AUTO: 3.3 % — SIGNIFICANT CHANGE UP (ref 0–8)
GLUCOSE BLDC GLUCOMTR-MCNC: 100 MG/DL — HIGH (ref 70–99)
GLUCOSE BLDC GLUCOMTR-MCNC: 68 MG/DL — LOW (ref 70–99)
GLUCOSE BLDC GLUCOMTR-MCNC: 74 MG/DL — SIGNIFICANT CHANGE UP (ref 70–99)
GLUCOSE BLDC GLUCOMTR-MCNC: 96 MG/DL — SIGNIFICANT CHANGE UP (ref 70–99)
GLUCOSE SERPL-MCNC: 68 MG/DL — LOW (ref 70–99)
HCT VFR BLD CALC: 36 % — LOW (ref 37–47)
HGB BLD-MCNC: 11.9 G/DL — LOW (ref 12–16)
IMM GRANULOCYTES NFR BLD AUTO: 0.2 % — SIGNIFICANT CHANGE UP (ref 0.1–0.3)
LYMPHOCYTES # BLD AUTO: 1.94 K/UL — SIGNIFICANT CHANGE UP (ref 1.2–3.4)
LYMPHOCYTES # BLD AUTO: 22.9 % — SIGNIFICANT CHANGE UP (ref 20.5–51.1)
MAGNESIUM SERPL-MCNC: 1.6 MG/DL — LOW (ref 1.8–2.4)
MCHC RBC-ENTMCNC: 28.1 PG — SIGNIFICANT CHANGE UP (ref 27–31)
MCHC RBC-ENTMCNC: 33.1 G/DL — SIGNIFICANT CHANGE UP (ref 32–37)
MCV RBC AUTO: 85.1 FL — SIGNIFICANT CHANGE UP (ref 81–99)
MONOCYTES # BLD AUTO: 0.48 K/UL — SIGNIFICANT CHANGE UP (ref 0.1–0.6)
MONOCYTES NFR BLD AUTO: 5.7 % — SIGNIFICANT CHANGE UP (ref 1.7–9.3)
NEUTROPHILS # BLD AUTO: 5.69 K/UL — SIGNIFICANT CHANGE UP (ref 1.4–6.5)
NEUTROPHILS NFR BLD AUTO: 67.3 % — SIGNIFICANT CHANGE UP (ref 42.2–75.2)
NRBC BLD AUTO-RTO: 0 /100 WBCS — SIGNIFICANT CHANGE UP (ref 0–0)
PLATELET # BLD AUTO: 178 K/UL — SIGNIFICANT CHANGE UP (ref 130–400)
PMV BLD: 11.8 FL — HIGH (ref 7.4–10.4)
POTASSIUM SERPL-MCNC: 3.5 MMOL/L — SIGNIFICANT CHANGE UP (ref 3.5–5)
POTASSIUM SERPL-SCNC: 3.5 MMOL/L — SIGNIFICANT CHANGE UP (ref 3.5–5)
PROT SERPL-MCNC: 5.4 G/DL — LOW (ref 6–8)
PTH-INTACT FLD-MCNC: 195 PG/ML — HIGH (ref 15–65)
PTH-INTACT FLD-MCNC: 195 PG/ML — HIGH (ref 15–65)
RBC # BLD: 4.23 M/UL — SIGNIFICANT CHANGE UP (ref 4.2–5.4)
RBC # FLD: 15.2 % — HIGH (ref 11.5–14.5)
SODIUM SERPL-SCNC: 146 MMOL/L — SIGNIFICANT CHANGE UP (ref 135–146)
WBC # BLD: 8.46 K/UL — SIGNIFICANT CHANGE UP (ref 4.8–10.8)
WBC # FLD AUTO: 8.46 K/UL — SIGNIFICANT CHANGE UP (ref 4.8–10.8)

## 2025-04-12 PROCEDURE — 99232 SBSQ HOSP IP/OBS MODERATE 35: CPT

## 2025-04-12 RX ORDER — MAGNESIUM SULFATE 500 MG/ML
2 SYRINGE (ML) INJECTION
Refills: 0 | Status: COMPLETED | OUTPATIENT
Start: 2025-04-12 | End: 2025-04-12

## 2025-04-12 RX ORDER — SODIUM CHLORIDE 9 G/1000ML
1000 INJECTION, SOLUTION INTRAVENOUS
Refills: 0 | Status: COMPLETED | OUTPATIENT
Start: 2025-04-12 | End: 2025-04-12

## 2025-04-12 RX ADMIN — Medication 25 GRAM(S): at 13:01

## 2025-04-12 RX ADMIN — Medication 25 GRAM(S): at 11:08

## 2025-04-12 RX ADMIN — SODIUM CHLORIDE 100 MILLILITER(S): 9 INJECTION, SOLUTION INTRAVENOUS at 05:33

## 2025-04-12 RX ADMIN — SODIUM CHLORIDE 100 MILLILITER(S): 9 INJECTION, SOLUTION INTRAVENOUS at 11:07

## 2025-04-12 RX ADMIN — RIVAROXABAN 20 MILLIGRAM(S): 10 TABLET, FILM COATED ORAL at 17:46

## 2025-04-12 RX ADMIN — SODIUM CHLORIDE 100 MILLILITER(S): 9 INJECTION, SOLUTION INTRAVENOUS at 17:46

## 2025-04-12 RX ADMIN — LOSARTAN POTASSIUM 100 MILLIGRAM(S): 100 TABLET, FILM COATED ORAL at 05:33

## 2025-04-12 NOTE — PROGRESS NOTE ADULT - SUBJECTIVE AND OBJECTIVE BOX
PATRICIA SPRAGUE  74y  Female      Patient is a 74y old  Female who presents with a chief complaint of Abnormal labs.      INTERVAL HPI/OVERNIGHT EVENTS:      ******************************* REVIEW OF SYSTEMS:**********************************************    All other review of systems negative    *********************** VITALS ******************************************    T(F): 97 (04-12-25 @ 05:52)  HR: 55 (04-12-25 @ 07:45) (54 - 57)  BP: 158/75 (04-12-25 @ 05:52) (155/89 - 166/95)  RR: 19 (04-12-25 @ 05:52) (18 - 19)  SpO2: 98% (04-12-25 @ 07:45) (96% - 100%)    04-11-25 @ 07:01  -  04-12-25 @ 07:00  --------------------------------------------------------  IN: 0 mL / OUT: 500 mL / NET: -500 mL            04-11-25 @ 07:01  -  04-12-25 @ 07:00  --------------------------------------------------------  IN: 0 mL / OUT: 500 mL / NET: -500 mL        ******************************** PHYSICAL EXAM:**************************************************  GENERAL: NAD    PSYCH: no agitation, baseline mentation  HEENT: trach collar      NERVOUS SYSTEM:  Alert & Oriented X3,     PULMONARY: BRITTANI, CTA    CARDIOVASCULAR: S1S2 RRR    GI: Soft, NT, ND; BS present.    EXTREMITIES:  2+ Peripheral Pulses, No clubbing, cyanosis, or edema    LYMPH: No lymphadenopathy noted    SKIN: No rashes or lesions      **************************** LABS *******************************************************                          11.9   8.46  )-----------( 178      ( 12 Apr 2025 07:20 )             36.0     04-12    146  |  116[H]  |  8[L]  ----------------------------<  68[L]  3.5   |  17  |  0.5[L]    Ca    12.7[H]      12 Apr 2025 07:20  Mg     1.6     04-12    TPro  5.4[L]  /  Alb  3.4[L]  /  TBili  1.4[H]  /  DBili  x   /  AST  30  /  ALT  28  /  AlkPhos  93  04-12      Urinalysis Basic - ( 12 Apr 2025 07:20 )    Color: x / Appearance: x / SG: x / pH: x  Gluc: 68 mg/dL / Ketone: x  / Bili: x / Urobili: x   Blood: x / Protein: x / Nitrite: x   Leuk Esterase: x / RBC: x / WBC x   Sq Epi: x / Non Sq Epi: x / Bacteria: x        Lactate Trend        CAPILLARY BLOOD GLUCOSE      POCT Blood Glucose.: 96 mg/dL (12 Apr 2025 12:19)          **************************Active Medications *******************************************  No Known Allergies      albuterol    90 MICROgram(s) HFA Inhaler 2 Puff(s) Inhalation every 6 hours PRN  dextrose 5%. 1000 milliLiter(s) IV Continuous <Continuous>  dextrose 5%. 1000 milliLiter(s) IV Continuous <Continuous>  dextrose 50% Injectable 25 Gram(s) IV Push once  dextrose 50% Injectable 12.5 Gram(s) IV Push once  dextrose 50% Injectable 25 Gram(s) IV Push once  dextrose Oral Gel 15 Gram(s) Oral once PRN  glucagon  Injectable 1 milliGRAM(s) IntraMuscular once  insulin lispro (ADMELOG) corrective regimen sliding scale   SubCutaneous three times a day before meals  lactated ringers. 1000 milliLiter(s) IV Continuous <Continuous>  losartan 100 milliGRAM(s) Oral daily  magnesium sulfate  IVPB 2 Gram(s) IV Intermittent every 2 hours  magnesium sulfate  IVPB 2 Gram(s) IV Intermittent every 2 hours  melatonin 3 milliGRAM(s) Oral at bedtime PRN  rivaroxaban 20 milliGRAM(s) Oral with dinner      ***************************************************  RADIOLOGY & ADDITIONAL TESTS:    Imaging Personally Reviewed:  [ ] YES  [ ] NO    HEALTH ISSUES - PROBLEM Dx:

## 2025-04-12 NOTE — PROGRESS NOTE ADULT - SUBJECTIVE AND OBJECTIVE BOX
SUBJECTIVE/OVERNIGHT EVENTS  Today is hospital day 2d. This morning patient was seen and examined at bedside, resting comfortably in bed. No acute or major events overnight.    HOSPITAL COURSE      CODE STATUS:    FAMILY COMMUNICATION  Contact date:  Name of person contacted:  Relationship to patient:  Communication details:    MEDICATIONS  STANDING MEDICATIONS  dextrose 5%. 1000 milliLiter(s) IV Continuous <Continuous>  dextrose 5%. 1000 milliLiter(s) IV Continuous <Continuous>  dextrose 50% Injectable 25 Gram(s) IV Push once  dextrose 50% Injectable 12.5 Gram(s) IV Push once  dextrose 50% Injectable 25 Gram(s) IV Push once  glucagon  Injectable 1 milliGRAM(s) IntraMuscular once  insulin lispro (ADMELOG) corrective regimen sliding scale   SubCutaneous three times a day before meals  losartan 100 milliGRAM(s) Oral daily  magnesium sulfate  IVPB 2 Gram(s) IV Intermittent every 2 hours  rivaroxaban 20 milliGRAM(s) Oral with dinner    PRN MEDICATIONS  albuterol    90 MICROgram(s) HFA Inhaler 2 Puff(s) Inhalation every 6 hours PRN  dextrose Oral Gel 15 Gram(s) Oral once PRN  melatonin 3 milliGRAM(s) Oral at bedtime PRN    VITALS  T(F): 97 (04-12-25 @ 05:52), Max: 97.4 (04-11-25 @ 20:35)  HR: 55 (04-12-25 @ 07:45) (54 - 57)  BP: 158/75 (04-12-25 @ 05:52) (155/89 - 166/95)  RR: 19 (04-12-25 @ 05:52) (18 - 19)  SpO2: 98% (04-12-25 @ 07:45) (96% - 100%)  POCT Blood Glucose.: 68 mg/dL (04-12-25 @ 08:48)  POCT Blood Glucose.: 75 mg/dL (04-11-25 @ 21:32)  POCT Blood Glucose.: 80 mg/dL (04-11-25 @ 17:08)  POCT Blood Glucose.: 77 mg/dL (04-11-25 @ 11:16)    PHYSICAL EXAM  GENERAL  (  x) NAD, lying in bed comfortably     (  ) obtunded     (  ) lethargic     (  ) somnolent    HEAD  ( x ) Atraumatic     (  ) hematoma     (  ) laceration (specify location:       )     NECK  ( x ) Supple     (  ) neck stiffness     (  ) nuchal rigidity     (  )  no JVD     (  ) JVD present ( -- cm)    HEART  Rate -->  (  x) normal rate    (  ) bradycardic    (  ) tachycardic  Rhythm -->  (  ) regular    (  ) regularly irregular    (  ) irregularly irregular  Murmurs -->  (  ) normal s1/s2    (  ) systolic murmur    (  ) diastolic murmur    (  ) continuous murmur     (  ) S3 present    (  ) S4 present    LUNGS  (  x)Unlabored respirations     (  ) tachypnea  (  ) B/L air entry     (  ) decreased breath sounds in:  (location     )    (  ) no adventitious sound     (  ) crackles     (  ) wheezing      (  ) rhonchi      (specify location:       )  (  ) chest wall tenderness (specify location:       )    ABDOMEN  ( x ) Soft     (  ) tense   |   (  ) nondistended     (  ) distended   |   (  ) +BS     (  ) hypoactive bowel sounds     (  ) hyperactive bowel sounds  (  ) nontender     (  ) RUQ tenderness     (  ) RLQ tenderness     (  ) LLQ tenderness     (  ) epigastric tenderness     (  ) diffuse tenderness  (  ) Splenomegaly      (  ) Hepatomegaly      (  ) Jaundice     (  ) ecchymosis     EXTREMITIES  (x  ) Normal     (  ) Rash     (  ) ecchymosis     (  ) varicose veins      (  ) pitting edema     (  ) non-pitting edema   (  ) ulceration     (  ) gangrene:     (location:     )    NERVOUS SYSTEM  (  ) A&Ox3     (  x) confused     (  ) lethargic  CN II-XII:     (  ) Intact     (  ) focal deficits  (Specify:     )   Upper extremities:     (  ) strength X/5     (  ) focal deficit (specify:    )  Lower extremities:     (  ) strength  X/5    (  ) focal deficit (specify:    )    SKIN  ( x ) No rashes or lesions     (  ) maculopapular rash     (  ) pustules     (  ) vesicles     (  ) ulcer     (  ) ecchymosis     (specify location:     )    (  ) Indwelling Burger Catheter   Date insterted:    Reason (  ) Critical illness     (  ) urinary retention    (  ) Accurate Ins/Outs Monitoring     (  ) CMO patient    (  ) Central Line  Date inserted:  Location: (  ) Right IJ   (  ) Left IJ   (  ) Right Fem   (  ) Left Fem    (  ) SPC  (  ) pigtail  (  ) PEG tube  (  ) colostomy  (  ) jejunostomy  (  ) U-Dall    LABS             11.9   8.46  )-----------( 178      ( 04-12-25 @ 07:20 )             36.0     146  |  116  |  8   -------------------------<  68   04-12-25 @ 07:20  3.5  |  17  |  0.5    Ca      12.7     04-12-25 @ 07:20  Mg     1.6     04-12-25 @ 07:20    TPro  5.4  /  Alb  3.4  /  TBili  1.4  /  DBili  x   /  AST  30  /  ALT  28  /  AlkPhos  93  /  GGT  x     04-12-25 @ 07:20        Urinalysis Basic - ( 12 Apr 2025 07:20 )    Color: x / Appearance: x / SG: x / pH: x  Gluc: 68 mg/dL / Ketone: x  / Bili: x / Urobili: x   Blood: x / Protein: x / Nitrite: x   Leuk Esterase: x / RBC: x / WBC x   Sq Epi: x / Non Sq Epi: x / Bacteria: x          IMAGING

## 2025-04-12 NOTE — PROGRESS NOTE ADULT - ASSESSMENT
75 yo F with PMHx Afib on Xarelto, DM2, HTN, congential solitary kidney, recent ICU admission in 11/2024 for septic shock 2/2 ESBL proteus bacteremia s/p trach/PEJ now on trach collar @6LPM presenting from NH for abnormal lab values. Patient is awake, alert, responds to questions, follows commands. She feels fine with no acute complaints. Per NH paperwork patient was sent in for Ca 12.6 and needs midline. Daughter at bedside confirmed and stated they wanted to give her IVF but had no vascular access.     #Hypokalemia  - K 2.8 on admission   - EKG: Afib HR 71, with PVCs  - s/p K 20mEq IV x3 in ED   -  repeat BMP, K 3.2 today     # Magnesium deficiency   - Mg 1.4 on admission   - s/p Mg 2g in ED  -  repeat Mg 1.7     #Moderate hypercalcemia r/o Primary Hyperparathyroidism   - Ca 13.1 (Corrected 12.8) on admission   - s/p 1L LR bolus in ED   -   , Vit D > WNL  - Continue IVF LR 100cc/hr    - pt is on MV and  cholecalciferol at NH, hold for now    > Will get Parathyroid US     #chronic Afib   - on Xarelto   - Last TTE 11/05/24: EF 71%, hyperdynamic, LA enlargement, fibrocalcific aortic valve w/ moderate AS, trivial pericardial effusion  - off telemetry now     #DM  - ISS for now, target -180  - Monitor FS and adjust accordingly     #HTN  - C/w home losartan 100mg    #Critical illness 11/2024 s/p Trach + PEJ   #Congenital solitary kidney  - Continue PEJ feeds   - Trach care       #DVT ppx: Xarelto  #GI ppx: None  #Diet: Tube feeds, nutrition cs  #Activity: IAT  #Dispo: From [facility],     Pending: / PTH Us/ Hyper Ca   Dispo: [facility]

## 2025-04-12 NOTE — PROGRESS NOTE ADULT - ASSESSMENT
73 yo F with PMHx Afib on Xarelto, DM2, HTN, congential solitary kidney, recent ICU admission in 11/2024 for septic shock 2/2 ESBL proteus bacteremia s/p trach/PEJ now on trach collar @6LPM presenting from NH for abnormal lab values. Patient is awake, alert, responds to questions, follows commands. She feels fine with no acute complaints. Per NH paperwork patient was sent in for Ca 12.6 and needs midline. Daughter at bedside confirmed and stated they wanted to give her IVF but had no vascular access.       Plans:    #Hypokalemia  - K 2.8 on admission   - EKG: Afib HR 71, with PVCs  - s/p K 20mEq IV x3 in ED   -  repeat BMP, K 3.2 today     # Magnesium deficiency   - Mg 1.4 on admission   - s/p Mg 2g in ED  -  repeat Mg 1.7     #Moderate hypercalcemia   - Ca 13.1 (Corrected 12.8) on admission   - s/p 1L LR bolus in ED   - Check PTH, PTHrP, Vit D, urine Ca  - f/u parathyroid US  - f/u Ca ( corrected Ca 13.4 on 4/11)-> c/w IVF LR @ 100ml/hr if still high  - pt is on MV and  cholecalciferol at NH, hold for now       #chronic Afib   - on Xarelto   - Last TTE 11/05/24: EF 71%, hyperdynamic, LA enlargement, fibrocalcific aortic valve w/ moderate AS, trivial pericardial effusion  - off telemetry now       #DM  - ISS for now, target -180  - Monitor FS and adjust accordingly       #HTN  - C/w home losartan 100mg    #Critical illness 11/2024 s/p Trach + PEJ   #Congenital solitary kidney  - Continue PEJ feeds   - Trach care       #DVT ppx: Xarelto  #GI ppx: None  #Diet: Tube feeds, nutrition cs  #Activity: IAT  #Dispo: From [facility],     Pending: Electrolyte imbalance   Dispo: [facility]

## 2025-04-13 LAB
ALBUMIN SERPL ELPH-MCNC: 2.9 G/DL — LOW (ref 3.5–5.2)
ALP SERPL-CCNC: 91 U/L — SIGNIFICANT CHANGE UP (ref 30–115)
ALT FLD-CCNC: 23 U/L — SIGNIFICANT CHANGE UP (ref 0–41)
ANION GAP SERPL CALC-SCNC: 10 MMOL/L — SIGNIFICANT CHANGE UP (ref 7–14)
AST SERPL-CCNC: 32 U/L — SIGNIFICANT CHANGE UP (ref 0–41)
BASOPHILS # BLD AUTO: 0.06 K/UL — SIGNIFICANT CHANGE UP (ref 0–0.2)
BASOPHILS NFR BLD AUTO: 0.6 % — SIGNIFICANT CHANGE UP (ref 0–1)
BILIRUB SERPL-MCNC: 1.3 MG/DL — HIGH (ref 0.2–1.2)
BUN SERPL-MCNC: 5 MG/DL — LOW (ref 10–20)
CALCIUM SERPL-MCNC: 12.8 MG/DL — HIGH (ref 8.4–10.5)
CHLORIDE SERPL-SCNC: 114 MMOL/L — HIGH (ref 98–110)
CO2 SERPL-SCNC: 18 MMOL/L — SIGNIFICANT CHANGE UP (ref 17–32)
CREAT SERPL-MCNC: 0.6 MG/DL — LOW (ref 0.7–1.5)
EGFR: 95 ML/MIN/1.73M2 — SIGNIFICANT CHANGE UP
EGFR: 95 ML/MIN/1.73M2 — SIGNIFICANT CHANGE UP
EOSINOPHIL # BLD AUTO: 0.19 K/UL — SIGNIFICANT CHANGE UP (ref 0–0.7)
EOSINOPHIL NFR BLD AUTO: 2 % — SIGNIFICANT CHANGE UP (ref 0–8)
GLUCOSE BLDC GLUCOMTR-MCNC: 102 MG/DL — HIGH (ref 70–99)
GLUCOSE BLDC GLUCOMTR-MCNC: 109 MG/DL — HIGH (ref 70–99)
GLUCOSE BLDC GLUCOMTR-MCNC: 110 MG/DL — HIGH (ref 70–99)
GLUCOSE BLDC GLUCOMTR-MCNC: 87 MG/DL — SIGNIFICANT CHANGE UP (ref 70–99)
GLUCOSE SERPL-MCNC: 113 MG/DL — HIGH (ref 70–99)
HCT VFR BLD CALC: 34.8 % — LOW (ref 37–47)
HGB BLD-MCNC: 11.6 G/DL — LOW (ref 12–16)
IMM GRANULOCYTES NFR BLD AUTO: 0.5 % — HIGH (ref 0.1–0.3)
LYMPHOCYTES # BLD AUTO: 1.81 K/UL — SIGNIFICANT CHANGE UP (ref 1.2–3.4)
LYMPHOCYTES # BLD AUTO: 19.5 % — LOW (ref 20.5–51.1)
MAGNESIUM SERPL-MCNC: 1.2 MG/DL — LOW (ref 1.8–2.4)
MCHC RBC-ENTMCNC: 28 PG — SIGNIFICANT CHANGE UP (ref 27–31)
MCHC RBC-ENTMCNC: 33.3 G/DL — SIGNIFICANT CHANGE UP (ref 32–37)
MCV RBC AUTO: 84.1 FL — SIGNIFICANT CHANGE UP (ref 81–99)
MONOCYTES # BLD AUTO: 0.61 K/UL — HIGH (ref 0.1–0.6)
MONOCYTES NFR BLD AUTO: 6.6 % — SIGNIFICANT CHANGE UP (ref 1.7–9.3)
NEUTROPHILS # BLD AUTO: 6.55 K/UL — HIGH (ref 1.4–6.5)
NEUTROPHILS NFR BLD AUTO: 70.8 % — SIGNIFICANT CHANGE UP (ref 42.2–75.2)
NRBC BLD AUTO-RTO: 0 /100 WBCS — SIGNIFICANT CHANGE UP (ref 0–0)
PLATELET # BLD AUTO: 190 K/UL — SIGNIFICANT CHANGE UP (ref 130–400)
PMV BLD: 12.3 FL — HIGH (ref 7.4–10.4)
POTASSIUM SERPL-MCNC: 3.7 MMOL/L — SIGNIFICANT CHANGE UP (ref 3.5–5)
POTASSIUM SERPL-SCNC: 3.7 MMOL/L — SIGNIFICANT CHANGE UP (ref 3.5–5)
PROT SERPL-MCNC: 5.2 G/DL — LOW (ref 6–8)
RBC # BLD: 4.14 M/UL — LOW (ref 4.2–5.4)
RBC # FLD: 15.2 % — HIGH (ref 11.5–14.5)
SODIUM SERPL-SCNC: 142 MMOL/L — SIGNIFICANT CHANGE UP (ref 135–146)
WBC # BLD: 9.27 K/UL — SIGNIFICANT CHANGE UP (ref 4.8–10.8)
WBC # FLD AUTO: 9.27 K/UL — SIGNIFICANT CHANGE UP (ref 4.8–10.8)

## 2025-04-13 PROCEDURE — 99233 SBSQ HOSP IP/OBS HIGH 50: CPT

## 2025-04-13 RX ORDER — MAGNESIUM SULFATE 500 MG/ML
2 SYRINGE (ML) INJECTION EVERY 4 HOURS
Refills: 0 | Status: COMPLETED | OUTPATIENT
Start: 2025-04-13 | End: 2025-04-13

## 2025-04-13 RX ADMIN — Medication 25 GRAM(S): at 13:15

## 2025-04-13 RX ADMIN — RIVAROXABAN 20 MILLIGRAM(S): 10 TABLET, FILM COATED ORAL at 18:44

## 2025-04-13 RX ADMIN — LOSARTAN POTASSIUM 100 MILLIGRAM(S): 100 TABLET, FILM COATED ORAL at 06:12

## 2025-04-13 RX ADMIN — Medication 25 GRAM(S): at 23:06

## 2025-04-13 RX ADMIN — SODIUM CHLORIDE 100 MILLILITER(S): 9 INJECTION, SOLUTION INTRAVENOUS at 06:13

## 2025-04-13 RX ADMIN — Medication 25 GRAM(S): at 18:45

## 2025-04-13 NOTE — PROGRESS NOTE ADULT - SUBJECTIVE AND OBJECTIVE BOX
PATRICIA SPRAGUE  74y  Female      Patient is a 74y old  Female who presents with a chief complaint of Abnormal labs.       INTERVAL HPI/OVERNIGHT EVENTS:      ******************************* REVIEW OF SYSTEMS:**********************************************    All other review of systems negative    *********************** VITALS ******************************************    T(F): 97.6 (04-13-25 @ 05:33)  HR: 53 (04-13-25 @ 05:33) (53 - 57)  BP: 150/69 (04-13-25 @ 05:33) (142/89 - 150/69)  RR: 19 (04-13-25 @ 05:33) (18 - 19)  SpO2: 98% (04-13-25 @ 08:05) (97% - 100%)    04-12-25 @ 07:01  -  04-13-25 @ 07:00  --------------------------------------------------------  IN: 0 mL / OUT: 500 mL / NET: -500 mL            04-12-25 @ 07:01  -  04-13-25 @ 07:00  --------------------------------------------------------  IN: 0 mL / OUT: 500 mL / NET: -500 mL        ******************************** PHYSICAL EXAM:**************************************************  GENERAL: NAD    PSYCH: no agitation,   HEENT: on trach collar     NERVOUS SYSTEM:  Alert & awake x 2,   PULMONARY: BRITTANI, CTA    CARDIOVASCULAR: S1S2 RRR    GI: Soft, NT, ND; BS present.    EXTREMITIES:  2+ Peripheral Pulses, No clubbing, cyanosis, or edema    LYMPH: No lymphadenopathy noted    SKIN: No rashes or lesions      **************************** LABS *******************************************************                          11.6   9.27  )-----------( 190      ( 13 Apr 2025 10:12 )             34.8     04-13    142  |  114[H]  |  5[L]  ----------------------------<  113[H]  3.7   |  18  |  0.6[L]    Ca    12.8[H]      13 Apr 2025 10:12  Mg     1.2     04-13    TPro  5.2[L]  /  Alb  2.9[L]  /  TBili  1.3[H]  /  DBili  x   /  AST  32  /  ALT  23  /  AlkPhos  91  04-13      Urinalysis Basic - ( 13 Apr 2025 10:12 )    Color: x / Appearance: x / SG: x / pH: x  Gluc: 113 mg/dL / Ketone: x  / Bili: x / Urobili: x   Blood: x / Protein: x / Nitrite: x   Leuk Esterase: x / RBC: x / WBC x   Sq Epi: x / Non Sq Epi: x / Bacteria: x        Lactate Trend        CAPILLARY BLOOD GLUCOSE      POCT Blood Glucose.: 87 mg/dL (13 Apr 2025 08:29)          **************************Active Medications *******************************************  No Known Allergies      albuterol    90 MICROgram(s) HFA Inhaler 2 Puff(s) Inhalation every 6 hours PRN  dextrose 5%. 1000 milliLiter(s) IV Continuous <Continuous>  dextrose 5%. 1000 milliLiter(s) IV Continuous <Continuous>  dextrose 50% Injectable 25 Gram(s) IV Push once  dextrose 50% Injectable 12.5 Gram(s) IV Push once  dextrose 50% Injectable 25 Gram(s) IV Push once  dextrose Oral Gel 15 Gram(s) Oral once PRN  glucagon  Injectable 1 milliGRAM(s) IntraMuscular once  insulin lispro (ADMELOG) corrective regimen sliding scale   SubCutaneous three times a day before meals  losartan 100 milliGRAM(s) Oral daily  magnesium sulfate  IVPB 2 Gram(s) IV Intermittent every 2 hours  melatonin 3 milliGRAM(s) Oral at bedtime PRN  rivaroxaban 20 milliGRAM(s) Oral with dinner      ***************************************************  RADIOLOGY & ADDITIONAL TESTS:    Imaging Personally Reviewed:  [ ] YES  [ ] NO    HEALTH ISSUES - PROBLEM Dx:

## 2025-04-13 NOTE — PROGRESS NOTE ADULT - ASSESSMENT
75 yo F with PMHx Afib on Xarelto, DM2, HTN, congential solitary kidney, recent ICU admission in 11/2024 for septic shock 2/2 ESBL proteus bacteremia s/p trach/PEJ now on trach collar @6LPM presenting from NH for abnormal lab values. Patient is awake, alert, responds to questions, follows commands. She feels fine with no acute complaints. Per NH paperwork patient was sent in for Ca 12.6 and needs midline. Daughter at bedside confirmed and stated they wanted to give her IVF but had no vascular access.     #Hypokalemia  - K 2.8 on admission   - EKG: Afib HR 71, with PVCs  - s/p K 20mEq IV x3 in ED   -  K 3.2 >> 3.7today     # Magnesium deficiency   - Mg 1.4 on admission   - s/p Mg 2g in ED  -  repeat Mg 1.7  >> 1.6 >> 1.2 today   - will replete .     #Moderate hypercalcemia r/o Primary Hyperparathyroidism   - Ca 13.1 (Corrected 12.8) on admission   - s/p 1L LR bolus in ED   -   , Vit D > WNL  - Continue IVF LR 100cc/hr    - pt is on MV and  cholecalciferol at NH, hold for now    > awaiting Neck US r/o PTH adenoma.     #chronic Afib   - on Xarelto   - Last TTE 11/05/24: EF 71%, hyperdynamic, LA enlargement, fibrocalcific aortic valve w/ moderate AS, trivial pericardial effusion  - off telemetry now     #DM  - ISS for now, target -180  - Monitor FS and adjust accordingly     #HTN  - C/w home losartan 100mg    #Critical illness 11/2024 s/p Trach + PEJ   #Congenital solitary kidney  - Continue PEJ feeds   - Trach care       #DVT ppx: Xarelto  #GI ppx: None  #Diet: Tube feeds, nutrition cs  #Activity: IAT  #Dispo: From [facility],     Pending: / PTH Us/ Hyper Ca   Dispo: [facility]

## 2025-04-14 LAB
ALBUMIN SERPL ELPH-MCNC: 3.1 G/DL — LOW (ref 3.5–5.2)
ALBUMIN SERPL ELPH-MCNC: 3.2 G/DL — LOW (ref 3.5–5.2)
ALP SERPL-CCNC: 92 U/L — SIGNIFICANT CHANGE UP (ref 30–115)
ALP SERPL-CCNC: 99 U/L — SIGNIFICANT CHANGE UP (ref 30–115)
ALT FLD-CCNC: 19 U/L — SIGNIFICANT CHANGE UP (ref 0–41)
ALT FLD-CCNC: 22 U/L — SIGNIFICANT CHANGE UP (ref 0–41)
ANION GAP SERPL CALC-SCNC: 10 MMOL/L — SIGNIFICANT CHANGE UP (ref 7–14)
ANION GAP SERPL CALC-SCNC: 7 MMOL/L — SIGNIFICANT CHANGE UP (ref 7–14)
AST SERPL-CCNC: 15 U/L — SIGNIFICANT CHANGE UP (ref 0–41)
AST SERPL-CCNC: 16 U/L — SIGNIFICANT CHANGE UP (ref 0–41)
BASOPHILS # BLD AUTO: 0.07 K/UL — SIGNIFICANT CHANGE UP (ref 0–0.2)
BASOPHILS NFR BLD AUTO: 0.7 % — SIGNIFICANT CHANGE UP (ref 0–1)
BILIRUB SERPL-MCNC: 1.3 MG/DL — HIGH (ref 0.2–1.2)
BILIRUB SERPL-MCNC: 1.6 MG/DL — HIGH (ref 0.2–1.2)
BUN SERPL-MCNC: 5 MG/DL — LOW (ref 10–20)
BUN SERPL-MCNC: 6 MG/DL — LOW (ref 10–20)
CALCIUM SERPL-MCNC: 13.1 MG/DL — HIGH (ref 8.4–10.5)
CALCIUM SERPL-MCNC: 13.4 MG/DL — HIGH (ref 8.4–10.5)
CHLORIDE SERPL-SCNC: 110 MMOL/L — SIGNIFICANT CHANGE UP (ref 98–110)
CHLORIDE SERPL-SCNC: 115 MMOL/L — HIGH (ref 98–110)
CHLORIDE UR-SCNC: 105 — SIGNIFICANT CHANGE UP
CO2 SERPL-SCNC: 24 MMOL/L — SIGNIFICANT CHANGE UP (ref 17–32)
CO2 SERPL-SCNC: 24 MMOL/L — SIGNIFICANT CHANGE UP (ref 17–32)
CREAT SERPL-MCNC: 0.5 MG/DL — LOW (ref 0.7–1.5)
CREAT SERPL-MCNC: 0.6 MG/DL — LOW (ref 0.7–1.5)
EGFR: 100 ML/MIN/1.73M2 — SIGNIFICANT CHANGE UP
EGFR: 100 ML/MIN/1.73M2 — SIGNIFICANT CHANGE UP
EGFR: 95 ML/MIN/1.73M2 — SIGNIFICANT CHANGE UP
EGFR: 95 ML/MIN/1.73M2 — SIGNIFICANT CHANGE UP
EOSINOPHIL # BLD AUTO: 0.21 K/UL — SIGNIFICANT CHANGE UP (ref 0–0.7)
EOSINOPHIL NFR BLD AUTO: 2 % — SIGNIFICANT CHANGE UP (ref 0–8)
GLUCOSE BLDC GLUCOMTR-MCNC: 104 MG/DL — HIGH (ref 70–99)
GLUCOSE BLDC GLUCOMTR-MCNC: 108 MG/DL — HIGH (ref 70–99)
GLUCOSE BLDC GLUCOMTR-MCNC: 90 MG/DL — SIGNIFICANT CHANGE UP (ref 70–99)
GLUCOSE BLDC GLUCOMTR-MCNC: 94 MG/DL — SIGNIFICANT CHANGE UP (ref 70–99)
GLUCOSE SERPL-MCNC: 90 MG/DL — SIGNIFICANT CHANGE UP (ref 70–99)
GLUCOSE SERPL-MCNC: 97 MG/DL — SIGNIFICANT CHANGE UP (ref 70–99)
HCT VFR BLD CALC: 34.9 % — LOW (ref 37–47)
HGB BLD-MCNC: 11.9 G/DL — LOW (ref 12–16)
IMM GRANULOCYTES NFR BLD AUTO: 0.3 % — SIGNIFICANT CHANGE UP (ref 0.1–0.3)
LYMPHOCYTES # BLD AUTO: 2.19 K/UL — SIGNIFICANT CHANGE UP (ref 1.2–3.4)
LYMPHOCYTES # BLD AUTO: 20.4 % — LOW (ref 20.5–51.1)
MAGNESIUM SERPL-MCNC: 2.3 MG/DL — SIGNIFICANT CHANGE UP (ref 1.8–2.4)
MAGNESIUM UR-MCNC: 31.4 MG/DL — SIGNIFICANT CHANGE UP
MCHC RBC-ENTMCNC: 28.5 PG — SIGNIFICANT CHANGE UP (ref 27–31)
MCHC RBC-ENTMCNC: 34.1 G/DL — SIGNIFICANT CHANGE UP (ref 32–37)
MCV RBC AUTO: 83.5 FL — SIGNIFICANT CHANGE UP (ref 81–99)
MONOCYTES # BLD AUTO: 0.76 K/UL — HIGH (ref 0.1–0.6)
MONOCYTES NFR BLD AUTO: 7.1 % — SIGNIFICANT CHANGE UP (ref 1.7–9.3)
MRSA PCR RESULT.: NEGATIVE — SIGNIFICANT CHANGE UP
NEUTROPHILS # BLD AUTO: 7.46 K/UL — HIGH (ref 1.4–6.5)
NEUTROPHILS NFR BLD AUTO: 69.5 % — SIGNIFICANT CHANGE UP (ref 42.2–75.2)
NRBC BLD AUTO-RTO: 0 /100 WBCS — SIGNIFICANT CHANGE UP (ref 0–0)
PLATELET # BLD AUTO: 204 K/UL — SIGNIFICANT CHANGE UP (ref 130–400)
PMV BLD: 12.4 FL — HIGH (ref 7.4–10.4)
POTASSIUM SERPL-MCNC: 2.6 MMOL/L — CRITICAL LOW (ref 3.5–5)
POTASSIUM SERPL-MCNC: 3.7 MMOL/L — SIGNIFICANT CHANGE UP (ref 3.5–5)
POTASSIUM SERPL-SCNC: 2.6 MMOL/L — CRITICAL LOW (ref 3.5–5)
POTASSIUM SERPL-SCNC: 3.7 MMOL/L — SIGNIFICANT CHANGE UP (ref 3.5–5)
POTASSIUM UR-SCNC: 13 MMOL/L — SIGNIFICANT CHANGE UP
PROT SERPL-MCNC: 5.3 G/DL — LOW (ref 6–8)
PROT SERPL-MCNC: 5.5 G/DL — LOW (ref 6–8)
RBC # BLD: 4.18 M/UL — LOW (ref 4.2–5.4)
RBC # FLD: 15.6 % — HIGH (ref 11.5–14.5)
SODIUM SERPL-SCNC: 144 MMOL/L — SIGNIFICANT CHANGE UP (ref 135–146)
SODIUM SERPL-SCNC: 146 MMOL/L — SIGNIFICANT CHANGE UP (ref 135–146)
SODIUM UR-SCNC: 80 MMOL/L — SIGNIFICANT CHANGE UP
WBC # BLD: 10.72 K/UL — SIGNIFICANT CHANGE UP (ref 4.8–10.8)
WBC # FLD AUTO: 10.72 K/UL — SIGNIFICANT CHANGE UP (ref 4.8–10.8)

## 2025-04-14 PROCEDURE — 99233 SBSQ HOSP IP/OBS HIGH 50: CPT

## 2025-04-14 PROCEDURE — 76536 US EXAM OF HEAD AND NECK: CPT | Mod: 26

## 2025-04-14 RX ORDER — CINACALCET 30 MG/1
30 TABLET, FILM COATED ORAL THREE TIMES A DAY
Refills: 0 | Status: DISCONTINUED | OUTPATIENT
Start: 2025-04-14 | End: 2025-04-14

## 2025-04-14 RX ORDER — CINACALCET 30 MG/1
30 TABLET, FILM COATED ORAL
Refills: 0 | Status: DISCONTINUED | OUTPATIENT
Start: 2025-04-14 | End: 2025-04-18

## 2025-04-14 RX ORDER — POLYETHYLENE GLYCOL 3350 17 G/17G
17 POWDER, FOR SOLUTION ORAL DAILY
Refills: 0 | Status: DISCONTINUED | OUTPATIENT
Start: 2025-04-14 | End: 2025-04-14

## 2025-04-14 RX ORDER — CINACALCET 30 MG/1
30 TABLET, FILM COATED ORAL
Refills: 0 | Status: DISCONTINUED | OUTPATIENT
Start: 2025-04-14 | End: 2025-04-14

## 2025-04-14 RX ORDER — POLYETHYLENE GLYCOL 3350 17 G/17G
17 POWDER, FOR SOLUTION ORAL DAILY
Refills: 0 | Status: DISCONTINUED | OUTPATIENT
Start: 2025-04-14 | End: 2025-04-15

## 2025-04-14 RX ADMIN — RIVAROXABAN 20 MILLIGRAM(S): 10 TABLET, FILM COATED ORAL at 18:04

## 2025-04-14 RX ADMIN — Medication 100 MILLILITER(S): at 23:45

## 2025-04-14 RX ADMIN — Medication 100 MILLILITER(S): at 21:57

## 2025-04-14 RX ADMIN — Medication 50 MILLIEQUIVALENT(S): at 12:38

## 2025-04-14 RX ADMIN — Medication 50 MILLIEQUIVALENT(S): at 10:37

## 2025-04-14 RX ADMIN — CINACALCET 30 MILLIGRAM(S): 30 TABLET, FILM COATED ORAL at 18:03

## 2025-04-14 RX ADMIN — Medication 50 MILLIEQUIVALENT(S): at 14:56

## 2025-04-14 RX ADMIN — Medication 1 APPLICATION(S): at 18:04

## 2025-04-14 RX ADMIN — Medication 40 MILLIEQUIVALENT(S): at 10:37

## 2025-04-14 RX ADMIN — Medication 40 MILLIEQUIVALENT(S): at 18:03

## 2025-04-14 RX ADMIN — LOSARTAN POTASSIUM 100 MILLIGRAM(S): 100 TABLET, FILM COATED ORAL at 06:06

## 2025-04-14 NOTE — SWALLOW BEDSIDE ASSESSMENT ADULT - SWALLOW EVAL: DIAGNOSIS
+toleration for moderately thick liquids w/o overt s/s aspiration/penetration; mild oral dysphagia
+toleration for puree, moderately thick liquids w/o overt s/s aspiration/penetration; mild oral dysphagia

## 2025-04-14 NOTE — SWALLOW BEDSIDE ASSESSMENT ADULT - COMMENTS
Pt known to acute SLP service, hx of multiple instrumental swallow studies, most recently FEES 11/15/2025, recs for NPO w/ non-oral means of nutrition/hydration. Per SLP @ [facility], pt w/ recent VFSS @ [facility] w/ recs for puree, moderately thick liquids.
Pt known to acute SLP service, hx of multiple instrumental swallow studies, most recently FEES 11/15/2025, recs for NPO w/ non-oral means of nutrition/hydration. Per SLP @ [facility], pt w/ recent VFSS @ [email or web address]] w/ recs for puree, moderately thick liquids.

## 2025-04-14 NOTE — SWALLOW BEDSIDE ASSESSMENT ADULT - SLP GENERAL OBSERVATIONS
pt received in bed awake alert +dysarthria +Shiley trach size 7.5, +10L humidified O2 via trach collar, PMSV placed on trach hub, +voicing
pt received in bed awake alert +dysarthria +Shiley trach size 7.5, +10L humidified O2 via trach collar, +PMSV in place, +voicing; pt reports +toleration of current PO diet.

## 2025-04-14 NOTE — PROGRESS NOTE ADULT - ASSESSMENT
75 yo F with PMHx Afib on Xarelto, DM2, HTN, congential solitary kidney, recent ICU admission in 11/2024 for septic shock 2/2 ESBL proteus bacteremia s/p trach/PEJ now on trach collar @6LPM presenting from NH for abnormal lab values. Patient is awake, alert, responds to questions, follows commands. She feels fine with no acute complaints. Per NH paperwork patient was sent in for Ca 12.6 and needs midline. Daughter at bedside confirmed and stated they wanted to give her IVF but had no vascular access.     #Hypokalemia  - K 2.8 on admission   - EKG: Afib HR 71, with PVCs  - s/p K 20mEq IV x3 in ED   -  K 3.2 >> 3.7 >> 2.6 today  > will replete   - pls check  Urine lytes.     # Magnesium deficiency   - Mg 1.4 on admission   - s/p Mg 2g in ED  -  repeat Mg 1.7  >> 1.6 >> 1.2 >> 2.3 today post repletion.      #Moderate hypercalcemia r/o Primary Hyperparathyroidism   - Ca 13.1 (Corrected 12.8) on admission   - s/p 1L LR bolus in ED   -   , Vit D > WNL  - Continue IVF LR 100cc/hr    - pt is on MV and  cholecalciferol at NH, hold for now    > awaiting Neck US r/o PTH adenoma.     #chronic Afib   - on Xarelto   - Last TTE 11/05/24: EF 71%, hyperdynamic, LA enlargement, fibrocalcific aortic valve w/ moderate AS, trivial pericardial effusion  - off telemetry now     #DM  - ISS for now, target -180  - Monitor FS and adjust accordingly     #HTN  - C/w home losartan 100mg    #Critical illness 11/2024 s/p Trach + PEJ   #Congenital solitary kidney  - Continue PEJ feeds   - Trach care       #DVT ppx: Xarelto  #GI ppx: None  #Diet: Tube feeds, nutrition cs  #Activity: IAT  #Dispo: From [facility],     Pending: / PTH Us/ refractory Hyper Ca / Endo  Plan dw daughter Stacy over phone.   Dispo: [facility]

## 2025-04-14 NOTE — PROGRESS NOTE ADULT - ASSESSMENT
73 yo F with PMHx Afib on Xarelto, DM2, HTN, congential solitary kidney, recent ICU admission in 11/2024 for septic shock 2/2 ESBL proteus bacteremia s/p trach/PEJ now on trach collar @6LPM presenting from NH for abnormal lab values. Patient is awake, alert, responds to questions, follows commands. She feels fine with no acute complaints. Per NH paperwork patient was sent in for Ca 12.6 and needs midline. Daughter at bedside confirmed and stated they wanted to give her IVF but had no vascular access.      #Hypokalemia  - K 2.8 on admission   -  K 3.2 >> ??  - EKG: Afib HR 71, with PVCs  - replete as needed       # Magnesium deficiency   - Mg 1.4 on admission   - s/p Mg 2g in ED  -  repeat Mg 1.7     #Moderate hypercalcemia   - Ca 13.1 (Corrected 12.8) on admission   - s/p 1L LR bolus in ED   - Check PTH, PTHrP, Vit D, urine Ca  - f/u parathyroid US  - f/u Ca ( corrected Ca 13.4 on 4/11)-> c/w IVF LR @ 100ml/hr if still high  - pt is on MV and  cholecalciferol at NH, hold for now       #chronic Afib   - on Xarelto   - Last TTE 11/05/24: EF 71%, hyperdynamic, LA enlargement, fibrocalcific aortic valve w/ moderate AS, trivial pericardial effusion  - off telemetry now       #DM  - ISS for now, target -180  - Monitor FS and adjust accordingly       #HTN  - C/w home losartan 100mg    #Critical illness 11/2024 s/p Trach + PEJ   #Congenital solitary kidney  - Continue PEJ feeds   - Trach care       #DVT ppx: Xarelto  #GI ppx: None  #Diet: Tube feeds, nutrition cs  #Activity: IAT  #Dispo: From [facility],     Pending: Electrolyte imbalance   Dispo: [facility]   75 yo F with PMHx Afib on Xarelto, DM2, HTN, congential solitary kidney, recent ICU admission in 11/2024 for septic shock 2/2 ESBL proteus bacteremia s/p trach/PEJ now on trach collar @6LPM presenting from NH for abnormal lab values. Patient is awake, alert, responds to questions, follows commands. She feels fine with no acute complaints. Per NH paperwork patient was sent in for Ca 12.6 and needs midline. Daughter at bedside confirmed and stated they wanted to give her IVF but had no vascular access.      #Hypercalcemia   - Ca 13.1 (Corrected 12.8) on admission   - s/p 1L LR bolus in ED   -   - Vit D 30  - S/p  IVF with no improvement   - F/u PTHrP, urine Ca  - f/u parathyroid US  - Ca 13.4 ( corrected 14) on 4/14  - Endo Cs  - pt is on MV and  cholecalciferol at NH, hold for now       #Hypokalemia  - K 2.8 on admission   -  K 3.2 >> 2.6  - EKG: Afib HR 71, with PVCs  - replete as needed       # Magnesium deficiency   - Mg 1.4 on admission >> 2.3  - replete as needed         #chronic Afib   - on Xarelto   - Last TTE 11/05/24: EF 71%, hyperdynamic, LA enlargement, fibrocalcific aortic valve w/ moderate AS, trivial pericardial effusion  - off telemetry now       #DM  - ISS for now, target -180  - Monitor FS and adjust accordingly       #HTN  - C/w home losartan 100mg    #Critical illness 11/2024 s/p Trach + PEJ   #Congenital solitary kidney  - Continue PEJ feeds   - Trach care       #DVT ppx: Xarelto  #GI ppx: None  #Diet: Tube feeds, nutrition cs  #Activity: IAT  #Dispo: From [facility],

## 2025-04-14 NOTE — SWALLOW BEDSIDE ASSESSMENT ADULT - DIET PRIOR TO ADMI
puree, moderately thick liquids (per pt, NH staff has not used PEJ in ~6 weeks)
puree, moderately thick liquids (per pt, NH staff has not used PEJ in ~6 weeks)

## 2025-04-14 NOTE — SWALLOW BEDSIDE ASSESSMENT ADULT - SWALLOW EVAL: RECOMMENDED FEEDING/EATING TECHNIQUES
PMSV must be worn w/ all PO/oral hygiene/position upright (90 degrees)/small sips/bites
PMSV must be worn w/ all PO/oral hygiene/position upright (90 degrees)/small sips/bites

## 2025-04-14 NOTE — PROGRESS NOTE ADULT - SUBJECTIVE AND OBJECTIVE BOX
SUBJECTIVE/OVERNIGHT EVENTS  Today is hospital day 4d. This morning patient was seen and examined at bedside, resting comfortably in bed. No acute or major events overnight.      CODE STATUS:      PMH:  HTN (hypertension)    Diabetes mellitus    Obesity    Gastroesophageal reflux disease    Active asthma    Generalized OA    Afib          PSH:  H/O hernia repair    History of cholecystectomy          MEDICATIONS  STANDING MEDICATIONS  dextrose 5%. 1000 milliLiter(s) IV Continuous <Continuous>  dextrose 5%. 1000 milliLiter(s) IV Continuous <Continuous>  dextrose 50% Injectable 25 Gram(s) IV Push once  dextrose 50% Injectable 12.5 Gram(s) IV Push once  dextrose 50% Injectable 25 Gram(s) IV Push once  glucagon  Injectable 1 milliGRAM(s) IntraMuscular once  insulin lispro (ADMELOG) corrective regimen sliding scale   SubCutaneous three times a day before meals  losartan 100 milliGRAM(s) Oral daily  magnesium sulfate  IVPB 2 Gram(s) IV Intermittent every 2 hours  rivaroxaban 20 milliGRAM(s) Oral with dinner    PRN MEDICATIONS  albuterol    90 MICROgram(s) HFA Inhaler 2 Puff(s) Inhalation every 6 hours PRN  dextrose Oral Gel 15 Gram(s) Oral once PRN  melatonin 3 milliGRAM(s) Oral at bedtime PRN    VITALS  T(F): 97.3 (04-14-25 @ 05:14), Max: 97.9 (04-13-25 @ 20:00)  HR: 59 (04-14-25 @ 05:14) (53 - 62)  BP: 153/82 (04-14-25 @ 05:14) (153/82 - 175/83)  RR: 19 (04-14-25 @ 05:14) (18 - 19)  SpO2: 99% (04-14-25 @ 05:14) (94% - 99%)  POCT Blood Glucose.: 90 mg/dL (04-14-25 @ 07:49)  POCT Blood Glucose.: 110 mg/dL (04-13-25 @ 21:43)  POCT Blood Glucose.: 102 mg/dL (04-13-25 @ 17:16)  POCT Blood Glucose.: 109 mg/dL (04-13-25 @ 12:43)    PHYSICAL EXAM  GENERAL  ( x ) NAD, lying in bed comfortably     (  ) obtunded     (  ) lethargic     (  ) somnolent    HEAD  ( x ) Atraumatic     (  ) hematoma     (  ) laceration (specify location:       )     NECK  ( x ) Supple     (  ) neck stiffness     (  ) nuchal rigidity     (  )  no JVD     (  ) JVD present ( -- cm)    HEART  Rate -->  ( x ) normal rate    (  ) bradycardic    (  ) tachycardic  Rhythm -->  ( x ) regular    (  ) regularly irregular    (  ) irregularly irregular  Murmurs -->  ( x ) normal s1/s2    (  ) systolic murmur    (  ) diastolic murmur    (  ) continuous murmur     (  ) S3 present    (  ) S4 present    LUNGS  Trach collar  ( x )Unlabored respirations     (  ) tachypnea  ( x ) B/L air entry     (  ) decreased breath sounds in:  (location     )    ( x ) no adventitious sound     (  ) crackles     (  ) wheezing      (  ) rhonchi      (specify location:       )  (  ) chest wall tenderness (specify location:       )    ABDOMEN  ( x ) Soft     (  ) tense   |   ( x ) nondistended     (  ) distended   |   (  ) +BS     (  ) hypoactive bowel sounds     (  ) hyperactive bowel sounds  ( x ) nontender     (  ) RUQ tenderness     (  ) RLQ tenderness     (  ) LLQ tenderness     (  ) epigastric tenderness     (  ) diffuse tenderness  (  ) Splenomegaly      (  ) Hepatomegaly      (  ) Jaundice     (  ) ecchymosis     EXTREMITIES  (x  ) Normal     (  ) Rash     (  ) ecchymosis     (  ) varicose veins      (  ) pitting edema     (  ) non-pitting edema   (  ) ulceration     (  ) gangrene:     (location:     )    NERVOUS SYSTEM  ( x ) A&Ox3     (  ) confused     (  ) lethargic  CN II-XII:     (  ) Intact     (  ) focal deficits  (Specify:     )   Upper extremities:     (  ) strength X/5     (  ) focal deficit (specify:    )  Lower extremities:     (  ) strength  X/5    (  ) focal deficit (specify:    )      LABS             11.9   10.72 )-----------( 204      ( 04-14-25 @ 06:39 )             34.9     142  |  114  |  5   -------------------------<  113   04-13-25 @ 10:12  3.7  |  18  |  0.6    Ca      12.8     04-13-25 @ 10:12  Mg     1.2     04-13-25 @ 10:12    TPro  5.2  /  Alb  2.9  /  TBili  1.3  /  DBili  x   /  AST  32  /  ALT  23  /  AlkPhos  91  /  GGT  x     04-13-25 @ 10:12        Urinalysis Basic - ( 13 Apr 2025 10:12 )    Color: x / Appearance: x / SG: x / pH: x  Gluc: 113 mg/dL / Ketone: x  / Bili: x / Urobili: x   Blood: x / Protein: x / Nitrite: x   Leuk Esterase: x / RBC: x / WBC x   Sq Epi: x / Non Sq Epi: x / Bacteria: x          IMAGING SUBJECTIVE/OVERNIGHT EVENTS  Today is hospital day 4d. This morning patient was seen and examined at bedside, resting comfortably in bed. No acute or major events overnight.      CODE STATUS:      PMH:  HTN (hypertension)    Diabetes mellitus    Obesity    Gastroesophageal reflux disease    Active asthma    Generalized OA    Afib          PSH:  H/O hernia repair    History of cholecystectomy          MEDICATIONS  STANDING MEDICATIONS  dextrose 5%. 1000 milliLiter(s) IV Continuous <Continuous>  dextrose 5%. 1000 milliLiter(s) IV Continuous <Continuous>  dextrose 50% Injectable 25 Gram(s) IV Push once  dextrose 50% Injectable 12.5 Gram(s) IV Push once  dextrose 50% Injectable 25 Gram(s) IV Push once  glucagon  Injectable 1 milliGRAM(s) IntraMuscular once  insulin lispro (ADMELOG) corrective regimen sliding scale   SubCutaneous three times a day before meals  losartan 100 milliGRAM(s) Oral daily  magnesium sulfate  IVPB 2 Gram(s) IV Intermittent every 2 hours  rivaroxaban 20 milliGRAM(s) Oral with dinner    PRN MEDICATIONS  albuterol    90 MICROgram(s) HFA Inhaler 2 Puff(s) Inhalation every 6 hours PRN  dextrose Oral Gel 15 Gram(s) Oral once PRN  melatonin 3 milliGRAM(s) Oral at bedtime PRN    VITALS  T(F): 97.3 (04-14-25 @ 05:14), Max: 97.9 (04-13-25 @ 20:00)  HR: 59 (04-14-25 @ 05:14) (53 - 62)  BP: 153/82 (04-14-25 @ 05:14) (153/82 - 175/83)  RR: 19 (04-14-25 @ 05:14) (18 - 19)  SpO2: 99% (04-14-25 @ 05:14) (94% - 99%)  POCT Blood Glucose.: 90 mg/dL (04-14-25 @ 07:49)  POCT Blood Glucose.: 110 mg/dL (04-13-25 @ 21:43)  POCT Blood Glucose.: 102 mg/dL (04-13-25 @ 17:16)  POCT Blood Glucose.: 109 mg/dL (04-13-25 @ 12:43)    PHYSICAL EXAM  GENERAL  ( x ) NAD, lying in bed comfortably     (  ) obtunded     (  ) lethargic     (  ) somnolent    HEAD  ( x ) Atraumatic     (  ) hematoma     (  ) laceration (specify location:       )     NECK  ( x ) Supple     (  ) neck stiffness     (  ) nuchal rigidity     (  )  no JVD     (  ) JVD present ( -- cm)    HEART  Rate -->  ( x ) normal rate    (  ) bradycardic    (  ) tachycardic  Rhythm -->  ( x ) regular    (  ) regularly irregular    (  ) irregularly irregular  Murmurs -->  ( x ) normal s1/s2    (  ) systolic murmur    (  ) diastolic murmur    (  ) continuous murmur     (  ) S3 present    (  ) S4 present    LUNGS  Trach collar  ( x )Unlabored respirations     (  ) tachypnea  ( x ) B/L air entry     (  ) decreased breath sounds in:  (location     )    ( x ) no adventitious sound     (  ) crackles     (  ) wheezing      (  ) rhonchi      (specify location:       )  (  ) chest wall tenderness (specify location:       )    ABDOMEN  ( x ) Soft     (  ) tense   |   ( x ) nondistended     (  ) distended   |   (  ) +BS     (  ) hypoactive bowel sounds     (  ) hyperactive bowel sounds  ( x ) nontender     (  ) RUQ tenderness     (  ) RLQ tenderness     (  ) LLQ tenderness     (  ) epigastric tenderness     (  ) diffuse tenderness  (  ) Splenomegaly      (  ) Hepatomegaly      (  ) Jaundice     (  ) ecchymosis     EXTREMITIES  (x  ) Normal     (  ) Rash     (  ) ecchymosis     (  ) varicose veins      (  ) pitting edema     (  ) non-pitting edema   (  ) ulceration     (  ) gangrene:     (location:     )    NERVOUS SYSTEM  ( x ) A&Ox3     (  ) confused     (  ) lethargic  CN II-XII:     (  ) Intact     (  ) focal deficits  (Specify:     )   Upper extremities:     (  ) strength X/5     (  ) focal deficit (specify:    )  Lower extremities:     (  ) strength  X/5    (  ) focal deficit (specify:    )      LABS             11.9   10.72 )-----------( 204      ( 04-14-25 @ 06:39 )             34.9     142  |  114  |  5   -------------------------<  113   04-13-25 @ 10:12  3.7  |  18  |  0.6    Ca      12.8     04-13-25 @ 10:12  Mg     1.2     04-13-25 @ 10:12    TPro  5.2  /  Alb  2.9  /  TBili  1.3  /  DBili  x   /  AST  32  /  ALT  23  /  AlkPhos  91  /  GGT  x     04-13-25 @ 10:12        Urinalysis Basic - ( 13 Apr 2025 10:12 )    Color: x / Appearance: x / SG: x / pH: x  Gluc: 113 mg/dL / Ketone: x  / Bili: x / Urobili: x   Blood: x / Protein: x / Nitrite: x   Leuk Esterase: x / RBC: x / WBC x   Sq Epi: x / Non Sq Epi: x / Bacteria: x          IMAGING

## 2025-04-14 NOTE — PROGRESS NOTE ADULT - SUBJECTIVE AND OBJECTIVE BOX
PATRICIA SPRAGUE  74y  Female      Patient is a 74y old  Female who presents with a chief complaint of Abnormal labs.      INTERVAL HPI/OVERNIGHT EVENTS:      ******************************* REVIEW OF SYSTEMS:**********************************************    All other review of systems negative    *********************** VITALS ******************************************    T(F): 97.3 (04-14-25 @ 05:14)  HR: 59 (04-14-25 @ 05:14) (59 - 62)  BP: 153/82 (04-14-25 @ 05:14) (153/82 - 175/83)  RR: 19 (04-14-25 @ 05:14) (18 - 19)  SpO2: 95% (04-14-25 @ 08:15) (94% - 99%)    04-13-25 @ 07:01  -  04-14-25 @ 07:00  --------------------------------------------------------  IN: 0 mL / OUT: 500 mL / NET: -500 mL            04-13-25 @ 07:01  -  04-14-25 @ 07:00  --------------------------------------------------------  IN: 0 mL / OUT: 500 mL / NET: -500 mL        ******************************** PHYSICAL EXAM:**************************************************  GENERAL: NAD    PSYCH: no agitation, baseline mentation  HEENT: trach collar     NERVOUS SYSTEM:  Alert & Oriented X3,  PULMONARY: BRITTANI, CTA    CARDIOVASCULAR: S1S2 RRR    GI: Soft, NT, ND; BS present. PEJ tube     EXTREMITIES:  2+ Peripheral Pulses, No clubbing, cyanosis, or edema    LYMPH: No lymphadenopathy noted    SKIN: No rashes or lesions      **************************** LABS *******************************************************                          11.9   10.72 )-----------( 204      ( 14 Apr 2025 06:39 )             34.9     04-14    144  |  110  |  6[L]  ----------------------------<  90  2.6[LL]   |  24  |  0.6[L]    Ca    13.4[H]      14 Apr 2025 06:39  Mg     2.3     04-14    TPro  5.5[L]  /  Alb  3.2[L]  /  TBili  1.6[H]  /  DBili  x   /  AST  16  /  ALT  22  /  AlkPhos  99  04-14      Urinalysis Basic - ( 14 Apr 2025 06:39 )    Color: x / Appearance: x / SG: x / pH: x  Gluc: 90 mg/dL / Ketone: x  / Bili: x / Urobili: x   Blood: x / Protein: x / Nitrite: x   Leuk Esterase: x / RBC: x / WBC x   Sq Epi: x / Non Sq Epi: x / Bacteria: x        Lactate Trend        CAPILLARY BLOOD GLUCOSE      POCT Blood Glucose.: 108 mg/dL (14 Apr 2025 11:50)          **************************Active Medications *******************************************  No Known Allergies      albuterol    90 MICROgram(s) HFA Inhaler 2 Puff(s) Inhalation every 6 hours PRN  dextrose 5%. 1000 milliLiter(s) IV Continuous <Continuous>  dextrose 5%. 1000 milliLiter(s) IV Continuous <Continuous>  dextrose 50% Injectable 25 Gram(s) IV Push once  dextrose 50% Injectable 12.5 Gram(s) IV Push once  dextrose 50% Injectable 25 Gram(s) IV Push once  dextrose Oral Gel 15 Gram(s) Oral once PRN  glucagon  Injectable 1 milliGRAM(s) IntraMuscular once  insulin lispro (ADMELOG) corrective regimen sliding scale   SubCutaneous three times a day before meals  losartan 100 milliGRAM(s) Oral daily  magnesium sulfate  IVPB 2 Gram(s) IV Intermittent every 2 hours  melatonin 3 milliGRAM(s) Oral at bedtime PRN  polyethylene glycol 3350 17 Gram(s) Oral daily  potassium chloride  20 mEq/100 mL IVPB 20 milliEquivalent(s) IV Intermittent every 2 hours  rivaroxaban 20 milliGRAM(s) Oral with dinner  sodium chloride 0.9%. 1000 milliLiter(s) IV Continuous <Continuous>      ***************************************************  RADIOLOGY & ADDITIONAL TESTS:    Imaging Personally Reviewed:  [ ] YES  [ ] NO    HEALTH ISSUES - PROBLEM Dx:

## 2025-04-14 NOTE — CONSULT NOTE ADULT - SUBJECTIVE AND OBJECTIVE BOX
HISTORY OF PRESENT ILLNESS  PATRICIA SPRAGUE is a 75 yo F with PMHx Afib on Xarelto, DM2, HTN, congential solitary kidney, recent ICU admission in 11/2024 for septic shock 2/2 ESBL proteus bacteremia s/p trach/PEJ now on trach collar @6LPM presenting from NH for abnormal lab values. Patient is awake, alert, responds to questions, follows commands. She feels fine with no acute complaints. Per NH paperwork patient was sent in for Ca 12.6 and needs midline. Daughter at bedside confirmed and stated they wanted to give her IVF but had no vascular access.    Endocrinology has been consulted for Management of Hypercalcemia 2/2 Primary Hyperparathyroidism    ALLERGIES  No Known Allergies      CURRENT MEDICATIONS  albuterol    90 MICROgram(s) HFA Inhaler 2 Puff(s) Inhalation every 6 hours PRN  dextrose 5%. 1000 milliLiter(s) IV Continuous <Continuous>  dextrose 5%. 1000 milliLiter(s) IV Continuous <Continuous>  dextrose 50% Injectable 25 Gram(s) IV Push once  dextrose 50% Injectable 12.5 Gram(s) IV Push once  dextrose 50% Injectable 25 Gram(s) IV Push once  dextrose Oral Gel 15 Gram(s) Oral once PRN  glucagon  Injectable 1 milliGRAM(s) IntraMuscular once  insulin lispro (ADMELOG) corrective regimen sliding scale   SubCutaneous three times a day before meals  losartan 100 milliGRAM(s) Oral daily  magnesium sulfate  IVPB 2 Gram(s) IV Intermittent every 2 hours  melatonin 3 milliGRAM(s) Oral at bedtime PRN  polyethylene glycol 3350 17 Gram(s) Oral daily  potassium chloride  20 mEq/100 mL IVPB 20 milliEquivalent(s) IV Intermittent every 2 hours  rivaroxaban 20 milliGRAM(s) Oral with dinner  sodium chloride 0.9%. 1000 milliLiter(s) IV Continuous <Continuous>      REVIEW OF SYSTEMS  Constitutional:  Negative fever, chills or loss of appetite.  Eyes:  Negative blurry vision or double vision.  Cardiovascular:  Negative for chest pain or palpitations.  Respiratory:  Negative for cough, wheezing, or shortness of breath.   Gastrointestinal:  Negative for nausea, vomiting, diarrhea, constipation, or abdominal pain.  Genitourinary:  Negative frequency, urgency or dysuria.  Neurologic:  No headache, confusion, dizziness, lightheadedness.    PHYSICAL EXAM  Vital Signs Last 24 Hrs  T(C): 36.3 (14 Apr 2025 05:14), Max: 36.6 (13 Apr 2025 20:00)  T(F): 97.3 (14 Apr 2025 05:14), Max: 97.9 (13 Apr 2025 20:00)  HR: 59 (14 Apr 2025 05:14) (53 - 62)  BP: 153/82 (14 Apr 2025 05:14) (153/82 - 175/83)  RR: 19 (14 Apr 2025 05:14) (18 - 19)  SpO2: 95% (14 Apr 2025 08:15) (94% - 99%)    Parameters below as of 14 Apr 2025 08:15  Patient On (Oxygen Delivery Method): tracheostomy collar  O2 Flow (L/min): 7  O2 Concentration (%): 35Constitutional: Awake, alert, in no acute distress.   HEENT: Normocephalic, atraumatic, MAYCO, no proptosis or lid retraction.   Neck: supple, no acanthosis, no thyromegaly or palpable thyroid nodules.  Respiratory: Lungs clear to ausculation bilaterally.   Cardiovascular: regular rhythm, normal S1 and S2, no audible murmurs.   GI: soft, non-tender, non-distended, bowel sounds present, no masses appreciated.  Extremities: No lower extremity edema, peripheral pulses present.   Skin: no rashes.   Psychiatric: AAO x 3. Normal affect/mood.     LABS  CBC - WBC/HGB/HTC/PLT: 10.72/11.9/34.9/204 (04-14-25)  BMP: Na/K/Cl/Bicarb/BUN/Cr/Gluc: 144/2.6/110/24/6/0.6/90 (04-14-25)  Anion Gap: 10 (04-14-25)  eGFR: 95 (04-14-25)  Calcium: 13.4 (04-14-25)  Phosphorus: -- (04-14-25)  Magnesium: 2.3 (04-14-25)  LFT - Alb/Tprot/Tbili/Dbili/AlkPhos/ALT/AST: 3.2/--/1.6/--/99/22/16 (04-14-25)    Thyroid Stimulating Hormone, Serum: 0.78 (11-13-24)    CBC - WBC/HGB/HTC/PLT: 10.72/11.9/34.9/204 (04-14-25)BMP: Na/K/Cl/Bicarb/BUN/Cr/Gluc: 144/2.6/110/24/6/0.6/90 (04-14-25)  Anion Gap: 10 (04-14-25)  eGFR: 95 (04-14-25)  Calcium: 13.4 (04-14-25)  Phosphorus: -- (04-14-25)  Magnesium: 2.3 (04-14-25)    CAPILLARY BLOOD GLUCOSE & INSULIN RECEIVED  87 mg/dL (04-13 @ 08:29)  109 mg/dL (04-13 @ 12:43)  102 mg/dL (04-13 @ 17:16)  110 mg/dL (04-13 @ 21:43)  90 mg/dL (04-14 @ 07:49)        04-13-25 @ 07:01  -  04-14-25 @ 07:00  --------------------------------------------------------  IN: 0 mL / OUT: 500 mL / NET: -500 mL        ASSESSMENT / RECOMMENDATIONS    A1C: 5.1 %  BUN: 6  Creatinine: 0.6  GFR: 95  Weight: 82.1  BMI: 29.2  EF: 71%

## 2025-04-14 NOTE — CONSULT NOTE ADULT - ASSESSMENT
ASSESSMENT:    #Hypercalcemia 2/2 Primary Hyperparathyroidism    PLAN:    - f/u PTHrP and Vit D level  - poor surgical candidate, recommend medical management  - start Cinacalcet 30mg BID  - would start Vit D supplementation  - recommend repeating Calcium level in 2 weeks to adjust Cinacalcet dose ASSESSMENT:    #Asymptomatic Hypercalcemia 2/2 Primary Hyperparathyroidism    - patient was on Alendronate and Vit D supplementation outpatient  -  (repeat ), calcium 12.6, Phos 2.8, Vit D level 30  - Cr 0.6, eGFR 95    PLAN:    - poor surgical candidate, recommend medical management  - f/u parathyroid US  - order 24hr urinary calcium  - start Cinacalcet 30mg BID  - would resume Vit D supplementation  - recommend repeating Calcium level in 2 weeks to adjust Cinacalcet dose  - recommend outpatient DEXA ASSESSMENT:    #Asymptomatic Hypercalcemia 2/2 Primary Hyperparathyroidism  #r/o Adenoma or Carcinoma    - patient was on Alendronate and Vit D supplementation outpatient  -  (repeat ), calcium 12.6, Phos 2.8, Vit D level 30  - Cr 0.6, eGFR 95    PLAN:    - f/u parathyroid US  - start Cinacalcet 30mg BID STAT  - would monitor serum calcium twice a day for the first 2 days  - would resume Vit D supplementation and Bisphosphonate upon discharge  - order 24hr urinary calcium (can be done outpatient to check for hypercalciuria)  - recommend repeating Calcium level in 1 weeks to adjust Cinacalcet dose  - recommend outpatient DEXA  - recommend outpatient ENT eval, unsure if patient is a good surgical candidate  - f/u endo o/p

## 2025-04-14 NOTE — CONSULT NOTE ADULT - ATTENDING COMMENTS
73 yo F with PMHx Afib on Xarelto, DM2, HTN, congential solitary kidney, recent ICU admission in 11/2024 for septic shock 2/2 ESBL proteus bacteremia s/p trach/PEJ now on trach collar @6LPM presenting from NH for abnormal lab values.    #Primary hyperparathyroidism  - Elevated calcium 13.2 with  consistent with primary hyperparathyroidism, vitamin D 30 within normal limits 1, 25 OH vitamin D consistent with elevated PTH, calcium elevation likely worsened by immobilization  - Ultrasound parathyroid obtained follow the same, can obtain 24-hour urine calcium, and bone density scan outpatient  - appears to be asymptomatic   - Discussed pathophysiology evaluation and management of primary hyperparathyroidism with patient and family, explained that surgery is curative but currently considering elevated calcium levels will start on Sensipar  - Can start with Sensipar 30 mg twice a day check calcium twice a day till below 11, patient appears to be on fosamax outpatient for osteoporosis, continue maintaining good hydration  - Can consider outpatient ENT eval for primary hyperparathyroidism likley not a good candidate for surgery considering trach/peg  - will need outpatient endo followup

## 2025-04-14 NOTE — SWALLOW BEDSIDE ASSESSMENT ADULT - SLP PERTINENT HISTORY OF CURRENT PROBLEM
Pt is a 73 y/o F w/ PMHx: Afib (on Xarelto), DM2, HLD, HTN, congential solitary kidney, recent ICU admission in 11/2024 for septic shock 2' ESBL proteus bacteremia s/p trach/PEJ now on trach collar @6LPM,  presenting from [facility] for abnormal lab values. Pt is being treated for hypokalemia, magnesium deficiency, moderate hypercalcemia.
Pt is a 75 y/o F w/ PMHx: Afib (on Xarelto), DM2, HLD, HTN, congential solitary kidney, recent ICU admission in 11/2024 for septic shock 2' ESBL proteus bacteremia s/p trach/PEJ now on trach collar @6LPM,  presenting from [facility] for abnormal lab values. Pt is being treated for hypokalemia, magnesium deficiency, moderate hypercalcemia.

## 2025-04-15 LAB
ALBUMIN SERPL ELPH-MCNC: 3.1 G/DL — LOW (ref 3.5–5.2)
ALP SERPL-CCNC: 89 U/L — SIGNIFICANT CHANGE UP (ref 30–115)
ALT FLD-CCNC: 18 U/L — SIGNIFICANT CHANGE UP (ref 0–41)
ANION GAP SERPL CALC-SCNC: 6 MMOL/L — LOW (ref 7–14)
AST SERPL-CCNC: 14 U/L — SIGNIFICANT CHANGE UP (ref 0–41)
BASOPHILS # BLD AUTO: 0.07 K/UL — SIGNIFICANT CHANGE UP (ref 0–0.2)
BASOPHILS NFR BLD AUTO: 0.8 % — SIGNIFICANT CHANGE UP (ref 0–1)
BILIRUB SERPL-MCNC: 1.3 MG/DL — HIGH (ref 0.2–1.2)
BUN SERPL-MCNC: 5 MG/DL — LOW (ref 10–20)
CALCIUM SERPL-MCNC: 13.1 MG/DL — HIGH (ref 8.4–10.5)
CHLORIDE SERPL-SCNC: 116 MMOL/L — HIGH (ref 98–110)
CO2 SERPL-SCNC: 24 MMOL/L — SIGNIFICANT CHANGE UP (ref 17–32)
CREAT SERPL-MCNC: 0.6 MG/DL — LOW (ref 0.7–1.5)
EGFR: 95 ML/MIN/1.73M2 — SIGNIFICANT CHANGE UP
EGFR: 95 ML/MIN/1.73M2 — SIGNIFICANT CHANGE UP
EOSINOPHIL # BLD AUTO: 0.23 K/UL — SIGNIFICANT CHANGE UP (ref 0–0.7)
EOSINOPHIL NFR BLD AUTO: 2.8 % — SIGNIFICANT CHANGE UP (ref 0–8)
GLUCOSE BLDC GLUCOMTR-MCNC: 100 MG/DL — HIGH (ref 70–99)
GLUCOSE BLDC GLUCOMTR-MCNC: 101 MG/DL — HIGH (ref 70–99)
GLUCOSE BLDC GLUCOMTR-MCNC: 85 MG/DL — SIGNIFICANT CHANGE UP (ref 70–99)
GLUCOSE BLDC GLUCOMTR-MCNC: 95 MG/DL — SIGNIFICANT CHANGE UP (ref 70–99)
GLUCOSE SERPL-MCNC: 88 MG/DL — SIGNIFICANT CHANGE UP (ref 70–99)
HCT VFR BLD CALC: 33.4 % — LOW (ref 37–47)
HGB BLD-MCNC: 11.1 G/DL — LOW (ref 12–16)
IMM GRANULOCYTES NFR BLD AUTO: 0.2 % — SIGNIFICANT CHANGE UP (ref 0.1–0.3)
LYMPHOCYTES # BLD AUTO: 2.28 K/UL — SIGNIFICANT CHANGE UP (ref 1.2–3.4)
LYMPHOCYTES # BLD AUTO: 27.6 % — SIGNIFICANT CHANGE UP (ref 20.5–51.1)
MAGNESIUM SERPL-MCNC: 1.7 MG/DL — LOW (ref 1.8–2.4)
MCHC RBC-ENTMCNC: 28.2 PG — SIGNIFICANT CHANGE UP (ref 27–31)
MCHC RBC-ENTMCNC: 33.2 G/DL — SIGNIFICANT CHANGE UP (ref 32–37)
MCV RBC AUTO: 84.8 FL — SIGNIFICANT CHANGE UP (ref 81–99)
MONOCYTES # BLD AUTO: 0.66 K/UL — HIGH (ref 0.1–0.6)
MONOCYTES NFR BLD AUTO: 8 % — SIGNIFICANT CHANGE UP (ref 1.7–9.3)
NEUTROPHILS # BLD AUTO: 5 K/UL — SIGNIFICANT CHANGE UP (ref 1.4–6.5)
NEUTROPHILS NFR BLD AUTO: 60.6 % — SIGNIFICANT CHANGE UP (ref 42.2–75.2)
NRBC BLD AUTO-RTO: 0 /100 WBCS — SIGNIFICANT CHANGE UP (ref 0–0)
PLATELET # BLD AUTO: 200 K/UL — SIGNIFICANT CHANGE UP (ref 130–400)
PMV BLD: 12 FL — HIGH (ref 7.4–10.4)
POTASSIUM SERPL-MCNC: 3.6 MMOL/L — SIGNIFICANT CHANGE UP (ref 3.5–5)
POTASSIUM SERPL-SCNC: 3.6 MMOL/L — SIGNIFICANT CHANGE UP (ref 3.5–5)
PROT SERPL-MCNC: 5.1 G/DL — LOW (ref 6–8)
RBC # BLD: 3.94 M/UL — LOW (ref 4.2–5.4)
RBC # FLD: 15.6 % — HIGH (ref 11.5–14.5)
SODIUM SERPL-SCNC: 146 MMOL/L — SIGNIFICANT CHANGE UP (ref 135–146)
WBC # BLD: 8.26 K/UL — SIGNIFICANT CHANGE UP (ref 4.8–10.8)
WBC # FLD AUTO: 8.26 K/UL — SIGNIFICANT CHANGE UP (ref 4.8–10.8)

## 2025-04-15 PROCEDURE — 99233 SBSQ HOSP IP/OBS HIGH 50: CPT

## 2025-04-15 RX ORDER — ZOLEDRONIC ACID 0.05 MG/ML
4 INJECTION, SOLUTION INTRAVENOUS ONCE
Refills: 0 | Status: COMPLETED | OUTPATIENT
Start: 2025-04-15 | End: 2025-04-15

## 2025-04-15 RX ORDER — MAGNESIUM SULFATE 500 MG/ML
2 SYRINGE (ML) INJECTION EVERY 4 HOURS
Refills: 0 | Status: COMPLETED | OUTPATIENT
Start: 2025-04-15 | End: 2025-04-15

## 2025-04-15 RX ADMIN — Medication 25 GRAM(S): at 15:31

## 2025-04-15 RX ADMIN — ZOLEDRONIC ACID 4 MILLIGRAM(S): 0.05 INJECTION, SOLUTION INTRAVENOUS at 18:17

## 2025-04-15 RX ADMIN — Medication 100 MILLILITER(S): at 06:46

## 2025-04-15 RX ADMIN — Medication 100 MILLILITER(S): at 08:38

## 2025-04-15 RX ADMIN — Medication 100 MILLILITER(S): at 22:55

## 2025-04-15 RX ADMIN — LOSARTAN POTASSIUM 100 MILLIGRAM(S): 100 TABLET, FILM COATED ORAL at 05:50

## 2025-04-15 RX ADMIN — CINACALCET 30 MILLIGRAM(S): 30 TABLET, FILM COATED ORAL at 05:51

## 2025-04-15 RX ADMIN — Medication 25 GRAM(S): at 13:24

## 2025-04-15 RX ADMIN — Medication 40 MILLIEQUIVALENT(S): at 13:28

## 2025-04-15 RX ADMIN — RIVAROXABAN 20 MILLIGRAM(S): 10 TABLET, FILM COATED ORAL at 18:18

## 2025-04-15 RX ADMIN — CINACALCET 30 MILLIGRAM(S): 30 TABLET, FILM COATED ORAL at 18:18

## 2025-04-15 RX ADMIN — Medication 1 APPLICATION(S): at 05:51

## 2025-04-15 NOTE — PROGRESS NOTE ADULT - SUBJECTIVE AND OBJECTIVE BOX
SUBJECTIVE/OVERNIGHT EVENTS  Today is hospital day 5d. This morning patient was seen and examined at bedside, resting comfortably in bed. No acute or major events overnight.      CODE STATUS:      PMH:  HTN (hypertension)    Diabetes mellitus    Obesity    Gastroesophageal reflux disease    Active asthma    Generalized OA    Afib          PSH:  H/O hernia repair    History of cholecystectomy          MEDICATIONS  STANDING MEDICATIONS  chlorhexidine 2% Cloths 1 Application(s) Topical <User Schedule>  cinacalcet suspension 30 milliGRAM(s) Oral two times a day  dextrose 5%. 1000 milliLiter(s) IV Continuous <Continuous>  dextrose 5%. 1000 milliLiter(s) IV Continuous <Continuous>  dextrose 50% Injectable 25 Gram(s) IV Push once  dextrose 50% Injectable 12.5 Gram(s) IV Push once  dextrose 50% Injectable 25 Gram(s) IV Push once  glucagon  Injectable 1 milliGRAM(s) IntraMuscular once  insulin lispro (ADMELOG) corrective regimen sliding scale   SubCutaneous three times a day before meals  losartan 100 milliGRAM(s) Oral daily  magnesium sulfate  IVPB 2 Gram(s) IV Intermittent every 4 hours  potassium chloride   Powder 40 milliEquivalent(s) Oral once  rivaroxaban 20 milliGRAM(s) Oral with dinner  sodium chloride 0.9%. 1000 milliLiter(s) IV Continuous <Continuous>    PRN MEDICATIONS  albuterol    90 MICROgram(s) HFA Inhaler 2 Puff(s) Inhalation every 6 hours PRN  dextrose Oral Gel 15 Gram(s) Oral once PRN  melatonin 3 milliGRAM(s) Oral at bedtime PRN    VITALS  T(F): 97.3 (04-15-25 @ 05:15), Max: 98.2 (04-14-25 @ 14:53)  HR: 59 (04-15-25 @ 05:15) (59 - 72)  BP: 131/81 (04-15-25 @ 05:15) (131/81 - 155/90)  RR: 18 (04-15-25 @ 07:45) (18 - 19)  SpO2: 100% (04-15-25 @ 07:45) (93% - 100%)  POCT Blood Glucose.: 95 mg/dL (04-15-25 @ 11:29)  POCT Blood Glucose.: 85 mg/dL (04-15-25 @ 07:50)  POCT Blood Glucose.: 104 mg/dL (04-14-25 @ 21:07)  POCT Blood Glucose.: 94 mg/dL (04-14-25 @ 17:01)    PHYSICAL EXAM  GENERAL  ( x ) NAD, lying in bed comfortably     (  ) obtunded     (  ) lethargic     (  ) somnolent    HEAD  ( x ) Atraumatic     (  ) hematoma     (  ) laceration (specify location:       )     NECK  ( x ) Supple     (  ) neck stiffness     (  ) nuchal rigidity     (  )  no JVD     (  ) JVD present ( -- cm)    HEART  Rate -->  ( x ) normal rate    (  ) bradycardic    (  ) tachycardic  Rhythm -->  ( x ) regular    (  ) regularly irregular    (  ) irregularly irregular  Murmurs -->  ( x ) normal s1/s2    (  ) systolic murmur    (  ) diastolic murmur    (  ) continuous murmur     (  ) S3 present    (  ) S4 present    LUNGS  Trach collar  ( x )Unlabored respirations     (  ) tachypnea  ( x ) B/L air entry     (  ) decreased breath sounds in:  (location     )    ( x ) no adventitious sound     (  ) crackles     (  ) wheezing      (  ) rhonchi      (specify location:       )  (  ) chest wall tenderness (specify location:       )    ABDOMEN  PEG tube  ( x ) Soft     (  ) tense   |   ( x ) nondistended     (  ) distended   |   (  ) +BS     (  ) hypoactive bowel sounds     (  ) hyperactive bowel sounds  ( x ) nontender     (  ) RUQ tenderness     (  ) RLQ tenderness     (  ) LLQ tenderness     (  ) epigastric tenderness     (  ) diffuse tenderness  (  ) Splenomegaly      (  ) Hepatomegaly      (  ) Jaundice     (  ) ecchymosis     EXTREMITIES  (x  ) Normal     (  ) Rash     (  ) ecchymosis     (  ) varicose veins      (  ) pitting edema     (  ) non-pitting edema   (  ) ulceration     (  ) gangrene:     (location:     )    NERVOUS SYSTEM  ( x ) A&Ox3     (  ) confused     (  ) lethargic  CN II-XII:     (  ) Intact     (  ) focal deficits  (Specify:     )   Upper extremities:     (  ) strength X/5     (  ) focal deficit (specify:    )  Lower extremities:     (  ) strength  X/5    (  ) focal deficit (specify:    )      LABS             11.1   8.26  )-----------( 200      ( 04-15-25 @ 05:07 )             33.4     146  |  116  |  5   -------------------------<  88   04-15-25 @ 05:07  3.6  |  24  |  0.6    Ca      13.1     04-15-25 @ 05:07  Mg     1.7     04-15-25 @ 05:07    TPro  5.1  /  Alb  3.1  /  TBili  1.3  /  DBili  x   /  AST  14  /  ALT  18  /  AlkPhos  89  /  GGT  x     04-15-25 @ 05:07        Urinalysis Basic - ( 15 Apr 2025 05:07 )    Color: x / Appearance: x / SG: x / pH: x  Gluc: 88 mg/dL / Ketone: x  / Bili: x / Urobili: x   Blood: x / Protein: x / Nitrite: x   Leuk Esterase: x / RBC: x / WBC x   Sq Epi: x / Non Sq Epi: x / Bacteria: x

## 2025-04-15 NOTE — PROGRESS NOTE ADULT - ASSESSMENT
75 yo F with PMHx Afib on Xarelto, DM2, HTN, congential solitary kidney, recent ICU admission in 11/2024 for septic shock 2/2 ESBL proteus bacteremia s/p trach/PEJ now on trach collar @6LPM presenting from NH for abnormal lab values. Patient is awake, alert, responds to questions, follows commands. She feels fine with no acute complaints. Per NH paperwork patient was sent in for Ca 12.6 and needs midline. Daughter at bedside confirmed and stated they wanted to give her IVF but had no vascular access.      #Hypercalcemia   - Ca 13.1 (Corrected 12.8) on admission   - s/p 1L LR bolus in ED   -   - Vit D 30  - S/p  IVF with no improvement   - F/u PTHrP  - Parathyroid US: Limited/nondiagnostic study given tracheostomy.  - Ca 13.4 ( corrected 14) on 4/15  - Endo Cs: Sensipar 30 mg BID, calcium level BID till below 11, Will need further ESCOBAR was OP (24-hour urine calcium, and bone density scan,  ENT eval)  - pt is on MV and cholecalciferol at NH, hold for now       #Hypokalemia (improved)  - K 2.8 on admission   - K 3.2 >> 2.6 >> 3.6  - EKG: Afib HR 71, with PVCs  - replete as needed       # Hypomagnesemia   - Mg 1.4 on admission >> 2.3 >> 1.7  - replete as needed         #chronic Afib   - on Xarelto   - Last TTE 11/05/24: EF 71%, hyperdynamic, LA enlargement, fibrocalcific aortic valve w/ moderate AS, trivial pericardial effusion  - off telemetry now       #DM  - ISS for now, target -180  - Monitor FS and adjust accordingly       #HTN  - C/w home losartan 100mg    #Critical illness 11/2024 s/p Trach + PEJ   #Congenital solitary kidney  - Continue PEJ feeds   - Trach care       #DVT ppx: Xarelto  #GI ppx: None  #Diet: Tube feeds, nutrition cs  #Activity: IAT  #Dispo: From [facility],

## 2025-04-15 NOTE — PROGRESS NOTE ADULT - SUBJECTIVE AND OBJECTIVE BOX
HISTORY OF PRESENT ILLNESS  PATRICIA SPRAGUE is a 75 yo F with PMHx Afib on Xarelto, DM2, HTN, congential solitary kidney, recent ICU admission in 11/2024 for septic shock 2/2 ESBL proteus bacteremia s/p trach/PEJ now on trach collar @6LPM presenting from NH for abnormal lab values. Patient is awake, alert, responds to questions, follows commands. She feels fine with no acute complaints. Per NH paperwork patient was sent in for Ca 12.6 and needs midline. Daughter at bedside confirmed and stated they wanted to give her IVF but had no vascular access.    Endocrinology has been consulted for Management of Hypercalcemia 2/2 Primary Hyperparathyroidism    ALLERGIES  No Known Allergies      CURRENT MEDICATIONS  albuterol    90 MICROgram(s) HFA Inhaler 2 Puff(s) Inhalation every 6 hours PRN  dextrose 5%. 1000 milliLiter(s) IV Continuous <Continuous>  dextrose 5%. 1000 milliLiter(s) IV Continuous <Continuous>  dextrose 50% Injectable 25 Gram(s) IV Push once  dextrose 50% Injectable 12.5 Gram(s) IV Push once  dextrose 50% Injectable 25 Gram(s) IV Push once  dextrose Oral Gel 15 Gram(s) Oral once PRN  glucagon  Injectable 1 milliGRAM(s) IntraMuscular once  insulin lispro (ADMELOG) corrective regimen sliding scale   SubCutaneous three times a day before meals  losartan 100 milliGRAM(s) Oral daily  magnesium sulfate  IVPB 2 Gram(s) IV Intermittent every 2 hours  melatonin 3 milliGRAM(s) Oral at bedtime PRN  polyethylene glycol 3350 17 Gram(s) Oral daily  potassium chloride  20 mEq/100 mL IVPB 20 milliEquivalent(s) IV Intermittent every 2 hours  rivaroxaban 20 milliGRAM(s) Oral with dinner  sodium chloride 0.9%. 1000 milliLiter(s) IV Continuous <Continuous>      REVIEW OF SYSTEMS  Constitutional:  Negative fever, chills or loss of appetite.  Eyes:  Negative blurry vision or double vision.  Cardiovascular:  Negative for chest pain or palpitations.  Respiratory:  Negative for cough, wheezing, or shortness of breath.   Gastrointestinal:  Negative for nausea, vomiting, diarrhea, constipation, or abdominal pain.  Genitourinary:  Negative frequency, urgency or dysuria.  Neurologic:  No headache, confusion, dizziness, lightheadedness.    PHYSICAL EXAM  Vital Signs Last 24 Hrs  T(C): 36.3 (14 Apr 2025 05:14), Max: 36.6 (13 Apr 2025 20:00)  T(F): 97.3 (14 Apr 2025 05:14), Max: 97.9 (13 Apr 2025 20:00)  HR: 59 (14 Apr 2025 05:14) (53 - 62)  BP: 153/82 (14 Apr 2025 05:14) (153/82 - 175/83)  RR: 19 (14 Apr 2025 05:14) (18 - 19)  SpO2: 95% (14 Apr 2025 08:15) (94% - 99%)    Parameters below as of 14 Apr 2025 08:15    LABS  CBC - WBC/HGB/HTC/PLT: 10.72/11.9/34.9/204 (04-14-25)  BMP: Na/K/Cl/Bicarb/BUN/Cr/Gluc: 144/2.6/110/24/6/0.6/90 (04-14-25)  Anion Gap: 10 (04-14-25)  eGFR: 95 (04-14-25)  Calcium: 13.4 (04-14-25)  Phosphorus: -- (04-14-25)  Magnesium: 2.3 (04-14-25)  LFT - Alb/Tprot/Tbili/Dbili/AlkPhos/ALT/AST: 3.2/--/1.6/--/99/22/16 (04-14-25)    Thyroid Stimulating Hormone, Serum: 0.78 (11-13-24)    CBC - WBC/HGB/HTC/PLT: 10.72/11.9/34.9/204 (04-14-25)BMP: Na/K/Cl/Bicarb/BUN/Cr/Gluc: 144/2.6/110/24/6/0.6/90 (04-14-25)  Anion Gap: 10 (04-14-25)  eGFR: 95 (04-14-25)  Calcium: 13.4 (04-14-25)  Phosphorus: -- (04-14-25)  Magnesium: 2.3 (04-14-25)    CAPILLARY BLOOD GLUCOSE & INSULIN RECEIVED  87 mg/dL (04-13 @ 08:29)  109 mg/dL (04-13 @ 12:43)  102 mg/dL (04-13 @ 17:16)  110 mg/dL (04-13 @ 21:43)  90 mg/dL (04-14 @ 07:49)        04-13-25 @ 07:01  -  04-14-25 @ 07:00  --------------------------------------------------------  IN: 0 mL / OUT: 500 mL / NET: -500 mL        ASSESSMENT / RECOMMENDATIONS    A1C: 5.1 %  BUN: 6  Creatinine: 0.6  GFR: 95  Weight: 82.1  BMI: 29.2  EF: 71%

## 2025-04-15 NOTE — PROGRESS NOTE ADULT - ASSESSMENT
73 yo F with PMHx Afib on Xarelto, DM2, HTN, congential solitary kidney, recent ICU admission in 11/2024 for septic shock 2/2 ESBL proteus bacteremia s/p trach/PEJ now on trach collar @6LPM presenting from NH for abnormal lab values. Patient is awake, alert, responds to questions, follows commands. She feels fine with no acute complaints. Per NH paperwork patient was sent in for Ca 12.6 and needs midline. Daughter at bedside confirmed and stated they wanted to give her IVF but had no vascular access.     #Hypokalemia  - K 2.8 on admission   - EKG: Afib HR 71, with PVCs  - s/p K 20mEq IV x3 in ED   -  K 3.2 >> 3.7 >> 2.6  > 3.6  today   - pls f/u   Urine lytes.     # Magnesium deficiency   - Mg 1.4 on admission   - s/p Mg 2g in ED  -  repeat Mg 1.7  >> 1.6 >> 1.2 >> 2.3 > 1.7 today.       #Moderate hypercalcemia possible  Primary Hyperparathyroidism   - Ca 13.1 (Corrected 12.8) on admission   - s/p 1L LR bolus in ED   -   , Vit D > WNL  - Continue IVF   - pt is on MV and  cholecalciferol at NH, hold for now    > Neck US r/o PTH adenoma. >> inconclusive    > Endo f/u noted >> added Cinacalcet, trend Ca,    > ? CT Neck soft tissue     #chronic Afib   - on Xarelto   - Last TTE 11/05/24: EF 71%, hyperdynamic, LA enlargement, fibrocalcific aortic valve w/ moderate AS, trivial pericardial effusion  - off telemetry now     #DM  - ISS for now, target -180  - Monitor FS and adjust accordingly     #HTN  - C/w home losartan 100mg    #Critical illness 11/2024 s/p Trach + PEJ   #Congenital solitary kidney  - Continue PEJ feeds   - Trach care       #DVT ppx: Xarelto  #GI ppx: None  #Diet: Tube feeds, nutrition cs  #Activity: IAT  #Dispo: From [facility],     Pending:  refractory Hyper Ca /   Plan dw daughter Stacy at bedside.    Dispo: [facility]

## 2025-04-15 NOTE — PROGRESS NOTE ADULT - ASSESSMENT
75 yo F with PMHx Afib on Xarelto, DM2, HTN, congential solitary kidney, recent ICU admission in 11/2024 for septic shock 2/2 ESBL proteus bacteremia s/p trach/PEJ now on trach collar @6LPM presenting from NH for abnormal lab values.    #Primary hyperparathyroidism  - Elevated calcium 13.2 with  consistent with primary hyperparathyroidism, vitamin D 30 within normal limits 1, 25 OH vitamin D consistent with elevated PTH, calcium elevation likely worsened by immobilization  - Ultrasound parathyroid obtained limited exam with no obvious parathyroid due to body habitus and trach, can obtain 24-hour urine calcium, and bone density scan outpatient  - appears to be asymptomatic   - Discussed pathophysiology evaluation and management of primary hyperparathyroidism with patient and family, explained that surgery is curative but currently considering elevated calcium levels will start on Sensipar  - Started on Sensipar 30 mg twice a day yesterday, appears to be on Fosamax outpatient for osteoporosis, can give 1 dose of zoledronic acid 4 mg continue to maintain good hydration  - Continue monitoring calcium twice a day till levels are below 11 mg/dl corrected   - calcium largely unchanged so far, received 2 doses of sensipar, will continue to follow  - Can consider outpatient ENT eval for primary hyperparathyroidism neal not a good candidate for surgery considering trach/peg  - will need outpatient endo followup

## 2025-04-15 NOTE — PROGRESS NOTE ADULT - SUBJECTIVE AND OBJECTIVE BOX
PATRICIA SPRAGUE  74y  Female      Patient is a 74y old  Female who presents with a chief complaint of Abnormal labs.    INTERVAL HPI/OVERNIGHT EVENTS:      ******************************* REVIEW OF SYSTEMS:**********************************************    All other review of systems negative    *********************** VITALS ******************************************    T(F): 97.3 (04-15-25 @ 12:25)  HR: 62 (04-15-25 @ 12:25) (59 - 69)  BP: 136/75 (04-15-25 @ 12:25) (131/81 - 148/84)  RR: 18 (04-15-25 @ 12:25) (18 - 19)  SpO2: 98% (04-15-25 @ 12:25) (98% - 100%)    04-14-25 @ 07:01  -  04-15-25 @ 07:00  --------------------------------------------------------  IN: 900 mL / OUT: 500 mL / NET: 400 mL            04-14-25 @ 07:01  -  04-15-25 @ 07:00  --------------------------------------------------------  IN: 900 mL / OUT: 500 mL / NET: 400 mL        ******************************** PHYSICAL EXAM:**************************************************  GENERAL: NAD    PSYCH: no agitation, baseline mentation  HEENT: trach collar     NERVOUS SYSTEM:  Alert & Oriented X3,     PULMONARY: BRITTANI, CTA    CARDIOVASCULAR: S1S2 RRR    GI: Soft, NT, ND; BS present. PEJ tube     EXTREMITIES:  2+ Peripheral Pulses, No clubbing, cyanosis, or edema    LYMPH: No lymphadenopathy noted    SKIN: No rashes or lesions      **************************** LABS *******************************************************                          11.1   8.26  )-----------( 200      ( 15 Apr 2025 05:07 )             33.4     04-15    146  |  116[H]  |  5[L]  ----------------------------<  88  3.6   |  24  |  0.6[L]    Ca    13.1[H]      15 Apr 2025 05:07  Mg     1.7     04-15    TPro  5.1[L]  /  Alb  3.1[L]  /  TBili  1.3[H]  /  DBili  x   /  AST  14  /  ALT  18  /  AlkPhos  89  04-15      Urinalysis Basic - ( 15 Apr 2025 05:07 )    Color: x / Appearance: x / SG: x / pH: x  Gluc: 88 mg/dL / Ketone: x  / Bili: x / Urobili: x   Blood: x / Protein: x / Nitrite: x   Leuk Esterase: x / RBC: x / WBC x   Sq Epi: x / Non Sq Epi: x / Bacteria: x        Lactate Trend        CAPILLARY BLOOD GLUCOSE      POCT Blood Glucose.: 95 mg/dL (15 Apr 2025 11:29)          **************************Active Medications *******************************************  No Known Allergies      albuterol    90 MICROgram(s) HFA Inhaler 2 Puff(s) Inhalation every 6 hours PRN  chlorhexidine 2% Cloths 1 Application(s) Topical <User Schedule>  cinacalcet suspension 30 milliGRAM(s) Oral two times a day  dextrose 5%. 1000 milliLiter(s) IV Continuous <Continuous>  dextrose 5%. 1000 milliLiter(s) IV Continuous <Continuous>  dextrose 50% Injectable 25 Gram(s) IV Push once  dextrose 50% Injectable 12.5 Gram(s) IV Push once  dextrose 50% Injectable 25 Gram(s) IV Push once  dextrose Oral Gel 15 Gram(s) Oral once PRN  glucagon  Injectable 1 milliGRAM(s) IntraMuscular once  insulin lispro (ADMELOG) corrective regimen sliding scale   SubCutaneous three times a day before meals  losartan 100 milliGRAM(s) Oral daily  melatonin 3 milliGRAM(s) Oral at bedtime PRN  rivaroxaban 20 milliGRAM(s) Oral with dinner  sodium chloride 0.9%. 1000 milliLiter(s) IV Continuous <Continuous>      ***************************************************  RADIOLOGY & ADDITIONAL TESTS:    Imaging Personally Reviewed:  [ ] YES  [ ] NO    HEALTH ISSUES - PROBLEM Dx:

## 2025-04-16 LAB
ALBUMIN SERPL ELPH-MCNC: 2.9 G/DL — LOW (ref 3.5–5.2)
ALBUMIN SERPL ELPH-MCNC: 3.2 G/DL — LOW (ref 3.5–5.2)
ALP SERPL-CCNC: 87 U/L — SIGNIFICANT CHANGE UP (ref 30–115)
ALP SERPL-CCNC: 93 U/L — SIGNIFICANT CHANGE UP (ref 30–115)
ALT FLD-CCNC: 18 U/L — SIGNIFICANT CHANGE UP (ref 0–41)
ALT FLD-CCNC: 18 U/L — SIGNIFICANT CHANGE UP (ref 0–41)
ANION GAP SERPL CALC-SCNC: 12 MMOL/L — SIGNIFICANT CHANGE UP (ref 7–14)
ANION GAP SERPL CALC-SCNC: 7 MMOL/L — SIGNIFICANT CHANGE UP (ref 7–14)
AST SERPL-CCNC: 14 U/L — SIGNIFICANT CHANGE UP (ref 0–41)
AST SERPL-CCNC: 19 U/L — SIGNIFICANT CHANGE UP (ref 0–41)
BASOPHILS # BLD AUTO: 0.07 K/UL — SIGNIFICANT CHANGE UP (ref 0–0.2)
BASOPHILS NFR BLD AUTO: 0.7 % — SIGNIFICANT CHANGE UP (ref 0–1)
BILIRUB SERPL-MCNC: 1 MG/DL — SIGNIFICANT CHANGE UP (ref 0.2–1.2)
BILIRUB SERPL-MCNC: 1 MG/DL — SIGNIFICANT CHANGE UP (ref 0.2–1.2)
BUN SERPL-MCNC: 6 MG/DL — LOW (ref 10–20)
BUN SERPL-MCNC: 6 MG/DL — LOW (ref 10–20)
CALCIUM SERPL-MCNC: 13.3 MG/DL — HIGH (ref 8.4–10.5)
CALCIUM SERPL-MCNC: 13.5 MG/DL — HIGH (ref 8.4–10.5)
CHLORIDE SERPL-SCNC: 113 MMOL/L — HIGH (ref 98–110)
CHLORIDE SERPL-SCNC: 113 MMOL/L — HIGH (ref 98–110)
CO2 SERPL-SCNC: 21 MMOL/L — SIGNIFICANT CHANGE UP (ref 17–32)
CO2 SERPL-SCNC: 25 MMOL/L — SIGNIFICANT CHANGE UP (ref 17–32)
CREAT SERPL-MCNC: 0.6 MG/DL — LOW (ref 0.7–1.5)
CREAT SERPL-MCNC: 0.6 MG/DL — LOW (ref 0.7–1.5)
EGFR: 95 ML/MIN/1.73M2 — SIGNIFICANT CHANGE UP
EOSINOPHIL # BLD AUTO: 0.14 K/UL — SIGNIFICANT CHANGE UP (ref 0–0.7)
EOSINOPHIL NFR BLD AUTO: 1.3 % — SIGNIFICANT CHANGE UP (ref 0–8)
GI PCR PANEL: SIGNIFICANT CHANGE UP
GLUCOSE BLDC GLUCOMTR-MCNC: 101 MG/DL — HIGH (ref 70–99)
GLUCOSE BLDC GLUCOMTR-MCNC: 89 MG/DL — SIGNIFICANT CHANGE UP (ref 70–99)
GLUCOSE BLDC GLUCOMTR-MCNC: 91 MG/DL — SIGNIFICANT CHANGE UP (ref 70–99)
GLUCOSE BLDC GLUCOMTR-MCNC: 97 MG/DL — SIGNIFICANT CHANGE UP (ref 70–99)
GLUCOSE SERPL-MCNC: 107 MG/DL — HIGH (ref 70–99)
GLUCOSE SERPL-MCNC: 81 MG/DL — SIGNIFICANT CHANGE UP (ref 70–99)
HCT VFR BLD CALC: 36 % — LOW (ref 37–47)
HGB BLD-MCNC: 12 G/DL — SIGNIFICANT CHANGE UP (ref 12–16)
IMM GRANULOCYTES NFR BLD AUTO: 0.3 % — SIGNIFICANT CHANGE UP (ref 0.1–0.3)
LYMPHOCYTES # BLD AUTO: 19.6 % — LOW (ref 20.5–51.1)
LYMPHOCYTES # BLD AUTO: 2.09 K/UL — SIGNIFICANT CHANGE UP (ref 1.2–3.4)
MAGNESIUM SERPL-MCNC: 2.6 MG/DL — HIGH (ref 1.8–2.4)
MCHC RBC-ENTMCNC: 28.6 PG — SIGNIFICANT CHANGE UP (ref 27–31)
MCHC RBC-ENTMCNC: 33.3 G/DL — SIGNIFICANT CHANGE UP (ref 32–37)
MCV RBC AUTO: 85.9 FL — SIGNIFICANT CHANGE UP (ref 81–99)
MONOCYTES # BLD AUTO: 0.86 K/UL — HIGH (ref 0.1–0.6)
MONOCYTES NFR BLD AUTO: 8.1 % — SIGNIFICANT CHANGE UP (ref 1.7–9.3)
NEUTROPHILS # BLD AUTO: 7.46 K/UL — HIGH (ref 1.4–6.5)
NEUTROPHILS NFR BLD AUTO: 70 % — SIGNIFICANT CHANGE UP (ref 42.2–75.2)
NRBC BLD AUTO-RTO: 0 /100 WBCS — SIGNIFICANT CHANGE UP (ref 0–0)
PLATELET # BLD AUTO: 162 K/UL — SIGNIFICANT CHANGE UP (ref 130–400)
PMV BLD: 12.3 FL — HIGH (ref 7.4–10.4)
POTASSIUM SERPL-MCNC: 3.5 MMOL/L — SIGNIFICANT CHANGE UP (ref 3.5–5)
POTASSIUM SERPL-MCNC: 4.3 MMOL/L — SIGNIFICANT CHANGE UP (ref 3.5–5)
POTASSIUM SERPL-SCNC: 3.5 MMOL/L — SIGNIFICANT CHANGE UP (ref 3.5–5)
POTASSIUM SERPL-SCNC: 4.3 MMOL/L — SIGNIFICANT CHANGE UP (ref 3.5–5)
PROT SERPL-MCNC: 5 G/DL — LOW (ref 6–8)
PROT SERPL-MCNC: 5.2 G/DL — LOW (ref 6–8)
RBC # BLD: 4.19 M/UL — LOW (ref 4.2–5.4)
RBC # FLD: 16.3 % — HIGH (ref 11.5–14.5)
SODIUM SERPL-SCNC: 145 MMOL/L — SIGNIFICANT CHANGE UP (ref 135–146)
SODIUM SERPL-SCNC: 146 MMOL/L — SIGNIFICANT CHANGE UP (ref 135–146)
T4 FREE SERPL-MCNC: 1.4 NG/DL — SIGNIFICANT CHANGE UP (ref 0.9–1.8)
TSH SERPL-MCNC: 0.63 UIU/ML — SIGNIFICANT CHANGE UP (ref 0.27–4.2)
WBC # BLD: 10.65 K/UL — SIGNIFICANT CHANGE UP (ref 4.8–10.8)
WBC # FLD AUTO: 10.65 K/UL — SIGNIFICANT CHANGE UP (ref 4.8–10.8)

## 2025-04-16 PROCEDURE — 74018 RADEX ABDOMEN 1 VIEW: CPT | Mod: 26

## 2025-04-16 PROCEDURE — 99232 SBSQ HOSP IP/OBS MODERATE 35: CPT

## 2025-04-16 RX ORDER — CALCITONIN,SALMON,SYNTHETIC 200/SPRAY
330 AEROSOL, SPRAY WITH PUMP (ML) NASAL EVERY 12 HOURS
Refills: 0 | Status: DISCONTINUED | OUTPATIENT
Start: 2025-04-16 | End: 2025-04-16

## 2025-04-16 RX ORDER — CALCITONIN,SALMON,SYNTHETIC 200/SPRAY
330 AEROSOL, SPRAY WITH PUMP (ML) NASAL EVERY 12 HOURS
Refills: 0 | Status: DISCONTINUED | OUTPATIENT
Start: 2025-04-16 | End: 2025-04-17

## 2025-04-16 RX ADMIN — Medication 1 APPLICATION(S): at 05:36

## 2025-04-16 RX ADMIN — LOSARTAN POTASSIUM 100 MILLIGRAM(S): 100 TABLET, FILM COATED ORAL at 05:35

## 2025-04-16 RX ADMIN — RIVAROXABAN 20 MILLIGRAM(S): 10 TABLET, FILM COATED ORAL at 17:16

## 2025-04-16 RX ADMIN — CINACALCET 30 MILLIGRAM(S): 30 TABLET, FILM COATED ORAL at 17:16

## 2025-04-16 RX ADMIN — Medication 330 INTERNATIONAL UNIT(S): at 17:16

## 2025-04-16 RX ADMIN — CINACALCET 30 MILLIGRAM(S): 30 TABLET, FILM COATED ORAL at 05:35

## 2025-04-16 NOTE — PROGRESS NOTE ADULT - SUBJECTIVE AND OBJECTIVE BOX
Followup note, calcium continues to remain high on day 2 of sensipar, received 4 mg of zoledronic acid yesterday

## 2025-04-16 NOTE — PROGRESS NOTE ADULT - ASSESSMENT
73 yo F with PMHx Afib on Xarelto, DM2, HTN, congential solitary kidney, recent ICU admission in 11/2024 for septic shock 2/2 ESBL proteus bacteremia s/p trach/PEJ now on trach collar @6LPM presenting from NH for abnormal lab values.    #Primary hyperparathyroidism  - Elevated calcium 13.2 with  consistent with primary hyperparathyroidism, vitamin D 30 within normal limits 1, 25 OH vitamin D consistent with elevated PTH, calcium elevation likely worsened by immobilization  - Ultrasound parathyroid obtained limited exam with no obvious parathyroid due to body habitus and trach, can obtain 24-hour urine calcium, and bone density scan outpatient  - appears to be asymptomatic   - Discussed pathophysiology evaluation and management of primary hyperparathyroidism with patient and family, explained that surgery is curative but currently considering elevated calcium levels will start on Sensipar  - Started on Sensipar 30 mg twice a day 2 days ago, appears to be on Fosamax outpatient for osteoporosis, Received 1 dose of zoledronic acid 4 mg yesterday, (will take up to 72 hours to show full effect), agree with starting calcitonin 4 IU/kg every 12 hours for acute management, If calcium does not improve in 48 hours may need to increase calcitonin dose  - recommend to obtain Pthrp levels  - Continue monitoring calcium twice a day till levels are below 11 mg/dl corrected   - Can consider outpatient ENT eval for primary hyperparathyroidism neal not a good candidate for surgery considering trach/peg  - will need outpatient endo followup

## 2025-04-16 NOTE — PROGRESS NOTE ADULT - ASSESSMENT
73 yo F with PMHx Afib on Xarelto, DM2, HTN, congential solitary kidney, recent ICU admission in 11/2024 for septic shock 2/2 ESBL proteus bacteremia s/p trach/PEJ now on trach collar @6LPM presenting from NH for abnormal lab values. Patient is awake, alert, responds to questions, follows commands. She feels fine with no acute complaints. Per NH paperwork patient was sent in for Ca 12.6 and needs midline. Daughter at bedside confirmed and stated they wanted to give her IVF but had no vascular access.     # Hypercalcemia   # Primary Hyperparathyroidism   - Ca 13.1 (Corrected 12.8) on admission   - s/p 1L LR bolus in ED   -   - Vit D 30  - S/p  IVF with no improvement   - F/u PTHrP  - Parathyroid US: Limited/nondiagnostic study given tracheostomy.  - pt is on MV and cholecalciferol at NH, hold for now  - Endo Cs: Sensipar 30 mg BID, calcium level BID till below 11, Will need further ESCOBAR was OP (24-hour urine calcium, and bone density scan,  ENT eval)  - Per Endo, add calcitonin & zoledronic acid     #Hypokalemia (improved)  - K 2.8 on admission   - EKG: Afib HR 71, with PVCs  - Replete as needed     # Hypomagnesemia  (improved)  - Mg 1.4 on admission   - Replete as needed     #chronic Afib   - on Xarelto   - Last TTE 11/05/24: EF 71%, hyperdynamic, LA enlargement, fibrocalcific aortic valve w/ moderate AS, trivial pericardial effusion  - off telemetry now     #DM  - ISS for now, target -180  - Monitor FS and adjust accordingly     #HTN  - C/w home losartan 100mg    #Critical illness 11/2024 s/p Trach + PEJ   #Congenital solitary kidney  - Continue PEJ feeds   - Trach care       #DVT ppx: Xarelto  #GI ppx: None  #Diet: Tube feeds, nutrition cs  #Activity: IAT  #Dispo: From [facility],

## 2025-04-16 NOTE — PROGRESS NOTE ADULT - SUBJECTIVE AND OBJECTIVE BOX
SUBJECTIVE/OVERNIGHT EVENTS  Today is hospital day 6d. This morning patient was seen and examined at bedside, resting comfortably in bed. No acute or major events overnight.      CODE STATUS:      PMH:  HTN (hypertension)    Diabetes mellitus    Obesity    Gastroesophageal reflux disease    Active asthma    Generalized OA    Afib          PSH:  H/O hernia repair    History of cholecystectomy          MEDICATIONS  STANDING MEDICATIONS  calcitonin Injectable 330 International Unit(s) IntraMuscular every 12 hours  chlorhexidine 2% Cloths 1 Application(s) Topical <User Schedule>  cinacalcet suspension 30 milliGRAM(s) Oral two times a day  dextrose 5%. 1000 milliLiter(s) IV Continuous <Continuous>  dextrose 5%. 1000 milliLiter(s) IV Continuous <Continuous>  dextrose 50% Injectable 25 Gram(s) IV Push once  dextrose 50% Injectable 12.5 Gram(s) IV Push once  dextrose 50% Injectable 25 Gram(s) IV Push once  glucagon  Injectable 1 milliGRAM(s) IntraMuscular once  insulin lispro (ADMELOG) corrective regimen sliding scale   SubCutaneous three times a day before meals  losartan 100 milliGRAM(s) Oral daily  rivaroxaban 20 milliGRAM(s) Oral with dinner  sodium chloride 0.9%. 1000 milliLiter(s) IV Continuous <Continuous>    PRN MEDICATIONS  albuterol    90 MICROgram(s) HFA Inhaler 2 Puff(s) Inhalation every 6 hours PRN  dextrose Oral Gel 15 Gram(s) Oral once PRN  melatonin 3 milliGRAM(s) Oral at bedtime PRN    VITALS  T(F): 97.8 (04-16-25 @ 05:05), Max: 97.8 (04-16-25 @ 05:05)  HR: 58 (04-16-25 @ 05:05) (58 - 85)  BP: 135/84 (04-16-25 @ 05:05) (135/84 - 168/100)  RR: 18 (04-16-25 @ 09:35) (18 - 19)  SpO2: 100% (04-16-25 @ 09:35) (99% - 100%)  POCT Blood Glucose.: 89 mg/dL (04-16-25 @ 11:29)  POCT Blood Glucose.: 91 mg/dL (04-16-25 @ 08:07)  POCT Blood Glucose.: 101 mg/dL (04-15-25 @ 21:29)  POCT Blood Glucose.: 100 mg/dL (04-15-25 @ 17:31)    PHYSICAL EXAM  GENERAL  ( x ) NAD, lying in bed comfortably     (  ) obtunded     (  ) lethargic     (  ) somnolent    HEAD  ( x ) Atraumatic     (  ) hematoma     (  ) laceration (specify location:       )     NECK  ( x ) Supple     (  ) neck stiffness     (  ) nuchal rigidity     (  )  no JVD     (  ) JVD present ( -- cm)    HEART  Rate -->  ( x ) normal rate    (  ) bradycardic    (  ) tachycardic  Rhythm -->  ( x ) regular    (  ) regularly irregular    (  ) irregularly irregular  Murmurs -->  ( x ) normal s1/s2    (  ) systolic murmur    (  ) diastolic murmur    (  ) continuous murmur     (  ) S3 present    (  ) S4 present    LUNGS  Trach collar  ( x )Unlabored respirations     (  ) tachypnea  ( x ) B/L air entry     (  ) decreased breath sounds in:  (location     )    ( x ) no adventitious sound     (  ) crackles     (  ) wheezing      (  ) rhonchi      (specify location:       )  (  ) chest wall tenderness (specify location:       )    ABDOMEN  PEG tube  ( x ) Soft     (  ) tense   |   ( x ) nondistended     (  ) distended   |   (  ) +BS     (  ) hypoactive bowel sounds     (  ) hyperactive bowel sounds  ( x ) nontender     (  ) RUQ tenderness     (  ) RLQ tenderness     (  ) LLQ tenderness     (  ) epigastric tenderness     (  ) diffuse tenderness  (  ) Splenomegaly      (  ) Hepatomegaly      (  ) Jaundice     (  ) ecchymosis     EXTREMITIES  (x  ) Normal     (  ) Rash     (  ) ecchymosis     (  ) varicose veins      (  ) pitting edema     (  ) non-pitting edema   (  ) ulceration     (  ) gangrene:     (location:     )    NERVOUS SYSTEM  ( x ) A&Ox3     (  ) confused     (  ) lethargic  CN II-XII:     (  ) Intact     (  ) focal deficits  (Specify:     )   Upper extremities:     (  ) strength X/5     (  ) focal deficit (specify:    )  Lower extremities:     (  ) strength  X/5    (  ) focal deficit (specify:    )      LABS             12.0   10.65 )-----------( 162      ( 04-16-25 @ 05:06 )             36.0     146  |  113  |  6   -------------------------<  81   04-16-25 @ 05:06  4.3  |  21  |  0.6    Ca      13.5     04-16-25 @ 05:06  Mg     2.6     04-16-25 @ 05:06    TPro  5.2  /  Alb  2.9  /  TBili  1.0  /  DBili  x   /  AST  19  /  ALT  18  /  AlkPhos  93  /  GGT  x     04-16-25 @ 05:06        Urinalysis Basic - ( 16 Apr 2025 05:06 )    Color: x / Appearance: x / SG: x / pH: x  Gluc: 81 mg/dL / Ketone: x  / Bili: x / Urobili: x   Blood: x / Protein: x / Nitrite: x   Leuk Esterase: x / RBC: x / WBC x   Sq Epi: x / Non Sq Epi: x / Bacteria: x

## 2025-04-17 LAB
ALBUMIN SERPL ELPH-MCNC: 3 G/DL — LOW (ref 3.5–5.2)
ALBUMIN SERPL ELPH-MCNC: 3.1 G/DL — LOW (ref 3.5–5.2)
ALP SERPL-CCNC: 90 U/L — SIGNIFICANT CHANGE UP (ref 30–115)
ALP SERPL-CCNC: 92 U/L — SIGNIFICANT CHANGE UP (ref 30–115)
ALT FLD-CCNC: 14 U/L — SIGNIFICANT CHANGE UP (ref 0–41)
ALT FLD-CCNC: 15 U/L — SIGNIFICANT CHANGE UP (ref 0–41)
ANION GAP SERPL CALC-SCNC: 8 MMOL/L — SIGNIFICANT CHANGE UP (ref 7–14)
ANION GAP SERPL CALC-SCNC: 9 MMOL/L — SIGNIFICANT CHANGE UP (ref 7–14)
AST SERPL-CCNC: 11 U/L — SIGNIFICANT CHANGE UP (ref 0–41)
AST SERPL-CCNC: 13 U/L — SIGNIFICANT CHANGE UP (ref 0–41)
BASOPHILS # BLD AUTO: 0.06 K/UL — SIGNIFICANT CHANGE UP (ref 0–0.2)
BASOPHILS NFR BLD AUTO: 0.6 % — SIGNIFICANT CHANGE UP (ref 0–1)
BILIRUB SERPL-MCNC: 1 MG/DL — SIGNIFICANT CHANGE UP (ref 0.2–1.2)
BILIRUB SERPL-MCNC: 1.1 MG/DL — SIGNIFICANT CHANGE UP (ref 0.2–1.2)
BUN SERPL-MCNC: 11 MG/DL — SIGNIFICANT CHANGE UP (ref 10–20)
BUN SERPL-MCNC: 9 MG/DL — LOW (ref 10–20)
CALCIUM SERPL-MCNC: 11 MG/DL — HIGH (ref 8.4–10.5)
CALCIUM SERPL-MCNC: 9.7 MG/DL — SIGNIFICANT CHANGE UP (ref 8.4–10.5)
CHLORIDE SERPL-SCNC: 111 MMOL/L — HIGH (ref 98–110)
CHLORIDE SERPL-SCNC: 112 MMOL/L — HIGH (ref 98–110)
CO2 SERPL-SCNC: 25 MMOL/L — SIGNIFICANT CHANGE UP (ref 17–32)
CO2 SERPL-SCNC: 26 MMOL/L — SIGNIFICANT CHANGE UP (ref 17–32)
CREAT SERPL-MCNC: 0.6 MG/DL — LOW (ref 0.7–1.5)
CREAT SERPL-MCNC: 0.6 MG/DL — LOW (ref 0.7–1.5)
EGFR: 95 ML/MIN/1.73M2 — SIGNIFICANT CHANGE UP
EOSINOPHIL # BLD AUTO: 0.12 K/UL — SIGNIFICANT CHANGE UP (ref 0–0.7)
EOSINOPHIL NFR BLD AUTO: 1.2 % — SIGNIFICANT CHANGE UP (ref 0–8)
GLUCOSE BLDC GLUCOMTR-MCNC: 100 MG/DL — HIGH (ref 70–99)
GLUCOSE BLDC GLUCOMTR-MCNC: 83 MG/DL — SIGNIFICANT CHANGE UP (ref 70–99)
GLUCOSE BLDC GLUCOMTR-MCNC: 88 MG/DL — SIGNIFICANT CHANGE UP (ref 70–99)
GLUCOSE BLDC GLUCOMTR-MCNC: 92 MG/DL — SIGNIFICANT CHANGE UP (ref 70–99)
GLUCOSE BLDC GLUCOMTR-MCNC: 96 MG/DL — SIGNIFICANT CHANGE UP (ref 70–99)
GLUCOSE SERPL-MCNC: 84 MG/DL — SIGNIFICANT CHANGE UP (ref 70–99)
GLUCOSE SERPL-MCNC: 84 MG/DL — SIGNIFICANT CHANGE UP (ref 70–99)
HCT VFR BLD CALC: 34.1 % — LOW (ref 37–47)
HGB BLD-MCNC: 11.1 G/DL — LOW (ref 12–16)
IMM GRANULOCYTES NFR BLD AUTO: 0.4 % — HIGH (ref 0.1–0.3)
LYMPHOCYTES # BLD AUTO: 1.69 K/UL — SIGNIFICANT CHANGE UP (ref 1.2–3.4)
LYMPHOCYTES # BLD AUTO: 17.2 % — LOW (ref 20.5–51.1)
MAGNESIUM SERPL-MCNC: 1.6 MG/DL — LOW (ref 1.8–2.4)
MCHC RBC-ENTMCNC: 28.5 PG — SIGNIFICANT CHANGE UP (ref 27–31)
MCHC RBC-ENTMCNC: 32.6 G/DL — SIGNIFICANT CHANGE UP (ref 32–37)
MCV RBC AUTO: 87.7 FL — SIGNIFICANT CHANGE UP (ref 81–99)
MONOCYTES # BLD AUTO: 0.64 K/UL — HIGH (ref 0.1–0.6)
MONOCYTES NFR BLD AUTO: 6.5 % — SIGNIFICANT CHANGE UP (ref 1.7–9.3)
NEUTROPHILS # BLD AUTO: 7.27 K/UL — HIGH (ref 1.4–6.5)
NEUTROPHILS NFR BLD AUTO: 74.1 % — SIGNIFICANT CHANGE UP (ref 42.2–75.2)
NRBC BLD AUTO-RTO: 0 /100 WBCS — SIGNIFICANT CHANGE UP (ref 0–0)
PHOSPHATE SERPL-MCNC: 1.8 MG/DL — LOW (ref 2.1–4.9)
PLATELET # BLD AUTO: 198 K/UL — SIGNIFICANT CHANGE UP (ref 130–400)
PMV BLD: 12.4 FL — HIGH (ref 7.4–10.4)
POTASSIUM SERPL-MCNC: 3.4 MMOL/L — LOW (ref 3.5–5)
POTASSIUM SERPL-MCNC: 3.9 MMOL/L — SIGNIFICANT CHANGE UP (ref 3.5–5)
POTASSIUM SERPL-SCNC: 3.4 MMOL/L — LOW (ref 3.5–5)
POTASSIUM SERPL-SCNC: 3.9 MMOL/L — SIGNIFICANT CHANGE UP (ref 3.5–5)
PROT SERPL-MCNC: 4.8 G/DL — LOW (ref 6–8)
PROT SERPL-MCNC: 5.1 G/DL — LOW (ref 6–8)
RBC # BLD: 3.89 M/UL — LOW (ref 4.2–5.4)
RBC # FLD: 15.9 % — HIGH (ref 11.5–14.5)
SODIUM SERPL-SCNC: 145 MMOL/L — SIGNIFICANT CHANGE UP (ref 135–146)
SODIUM SERPL-SCNC: 146 MMOL/L — SIGNIFICANT CHANGE UP (ref 135–146)
WBC # BLD: 9.82 K/UL — SIGNIFICANT CHANGE UP (ref 4.8–10.8)
WBC # FLD AUTO: 9.82 K/UL — SIGNIFICANT CHANGE UP (ref 4.8–10.8)

## 2025-04-17 PROCEDURE — 99232 SBSQ HOSP IP/OBS MODERATE 35: CPT

## 2025-04-17 RX ORDER — MAGNESIUM SULFATE 500 MG/ML
2 SYRINGE (ML) INJECTION ONCE
Refills: 0 | Status: COMPLETED | OUTPATIENT
Start: 2025-04-17 | End: 2025-04-17

## 2025-04-17 RX ORDER — POTASSIUM PHOSPHATE, MONOBASIC POTASSIUM PHOSPHATE, DIBASIC INJECTION, 236; 224 MG/ML; MG/ML
30 SOLUTION, CONCENTRATE INTRAVENOUS ONCE
Refills: 0 | Status: COMPLETED | OUTPATIENT
Start: 2025-04-17 | End: 2025-04-17

## 2025-04-17 RX ADMIN — Medication 100 MILLILITER(S): at 06:13

## 2025-04-17 RX ADMIN — Medication 1 APPLICATION(S): at 06:13

## 2025-04-17 RX ADMIN — Medication 100 MILLILITER(S): at 12:52

## 2025-04-17 RX ADMIN — POTASSIUM PHOSPHATE, MONOBASIC POTASSIUM PHOSPHATE, DIBASIC INJECTION, 83.33 MILLIMOLE(S): 236; 224 SOLUTION, CONCENTRATE INTRAVENOUS at 12:52

## 2025-04-17 RX ADMIN — Medication 40 MILLIEQUIVALENT(S): at 12:52

## 2025-04-17 RX ADMIN — Medication 330 INTERNATIONAL UNIT(S): at 06:13

## 2025-04-17 RX ADMIN — LOSARTAN POTASSIUM 100 MILLIGRAM(S): 100 TABLET, FILM COATED ORAL at 06:12

## 2025-04-17 RX ADMIN — CINACALCET 30 MILLIGRAM(S): 30 TABLET, FILM COATED ORAL at 18:10

## 2025-04-17 RX ADMIN — RIVAROXABAN 20 MILLIGRAM(S): 10 TABLET, FILM COATED ORAL at 18:10

## 2025-04-17 RX ADMIN — Medication 25 GRAM(S): at 12:52

## 2025-04-17 RX ADMIN — CINACALCET 30 MILLIGRAM(S): 30 TABLET, FILM COATED ORAL at 06:13

## 2025-04-17 NOTE — PROGRESS NOTE ADULT - ATTENDING COMMENTS
75 yo F with PMHx Afib on Xarelto, DM2, HTN, congential solitary kidney, recent ICU admission in 11/2024 for septic shock 2/2 ESBL proteus bacteremia s/p trach/PEJ now on trach collar @6LPM presenting from NH for abnormal lab values. Patient is awake, alert, responds to questions, follows commands. She feels fine with no acute complaints. Per NH paperwork patient was sent in for Ca 12.6 and needs midline. Daughter at bedside confirmed and stated they wanted to give her IVF but had no vascular access.     # hypercalcemia, due to likely primary hyperparathyroidism   pt not a candidate for surgery   sp cinacalcet   now calcium 11    # chronic afib, cont xarelto     # DM , pt is not meeting criteria for DM based on fs or a1c   A1C with Estimated Average Glucose Result: 5.1(04.11.25 @ 06:04)  CAPILLARY BLOOD GLUCOSE  POCT Blood Glucose.: 92 mg/dL (17 Apr 2025 11:08)  POCT Blood Glucose.: 83 mg/dL (17 Apr 2025 07:49)  POCT Blood Glucose.: 101 mg/dL (16 Apr 2025 21:00)  POCT Blood Glucose.: 97 mg/dL (16 Apr 2025 17:16)  not on any standing dose of dm meds   consider dc fingerstick checks     # hypokalemia and mag def,   replace     # HTN   cont losartan     # sp trach and PEJ, cont local care   feeding via pej    # solitary kidney   0.6 (04-17-25 @ 05:02)    #Progress Note Handoff    Pending :  anticipate dc in am ( family was not agreeing with dc today due to low k and mag)   Family discussion: resident franck ruizuthgiovanni   Disposition: snf   code status: full code
Medicine Attending Attestation  Patient was seen and examined with medicine team.  Nursing records reviewed. I agree with the resident/PA/NP's note including past medical history, home medications, social history, allergies, surgical history, family history, and review of system. I have reviewed relevant vitals, laboratory values, imaging studies, and microbiology.   - Above resident's note was edited by me  - No overnight issue  - Calcium worsen to 13.6. Will do a trial of calcitonin  - Will get TSH with reflex T4  - Patient also with diarrhea in setting of hypercalcemia; will do kub to r/o overflow diarrhea due to constipation  - Fall precaution  - rest of A/P as per detailed housestaff note above except above modifications    Total time spent to complete patient's bedside assessment, reviewed medical chart, discussed medical plan of care with team was 45 minutes with >50% of time spent face to face with patient, discussion with patient/family and/or coordination of care.

## 2025-04-17 NOTE — PROGRESS NOTE ADULT - SUBJECTIVE AND OBJECTIVE BOX
SUBJECTIVE/OVERNIGHT EVENTS  Today is hospital day 7d. This morning patient was seen and examined at bedside, resting comfortably in bed. No acute or major events overnight.      CODE STATUS:      PMH:  HTN (hypertension)    Diabetes mellitus    Obesity    Gastroesophageal reflux disease    Active asthma    Generalized OA    Afib          PSH:  H/O hernia repair    History of cholecystectomy          MEDICATIONS  STANDING MEDICATIONS  chlorhexidine 2% Cloths 1 Application(s) Topical <User Schedule>  cinacalcet suspension 30 milliGRAM(s) Oral two times a day  dextrose 5%. 1000 milliLiter(s) IV Continuous <Continuous>  dextrose 5%. 1000 milliLiter(s) IV Continuous <Continuous>  dextrose 50% Injectable 25 Gram(s) IV Push once  dextrose 50% Injectable 12.5 Gram(s) IV Push once  dextrose 50% Injectable 25 Gram(s) IV Push once  glucagon  Injectable 1 milliGRAM(s) IntraMuscular once  insulin lispro (ADMELOG) corrective regimen sliding scale   SubCutaneous three times a day before meals  losartan 100 milliGRAM(s) Oral daily  magnesium sulfate  IVPB 2 Gram(s) IV Intermittent once  potassium chloride   Powder 40 milliEquivalent(s) Oral once  potassium phosphate IVPB 30 milliMole(s) IV Intermittent once  rivaroxaban 20 milliGRAM(s) Oral with dinner  sodium chloride 0.9%. 1000 milliLiter(s) IV Continuous <Continuous>    PRN MEDICATIONS  albuterol    90 MICROgram(s) HFA Inhaler 2 Puff(s) Inhalation every 6 hours PRN  dextrose Oral Gel 15 Gram(s) Oral once PRN  melatonin 3 milliGRAM(s) Oral at bedtime PRN    VITALS  T(F): 97 (04-17-25 @ 05:00), Max: 98.4 (04-16-25 @ 13:00)  HR: 64 (04-17-25 @ 05:00) (64 - 83)  BP: 142/72 (04-17-25 @ 05:00) (142/72 - 171/80)  RR: 18 (04-17-25 @ 05:00) (18 - 18)  SpO2: 94% (04-17-25 @ 05:00) (94% - 99%)  POCT Blood Glucose.: 92 mg/dL (04-17-25 @ 11:08)  POCT Blood Glucose.: 83 mg/dL (04-17-25 @ 07:49)  POCT Blood Glucose.: 101 mg/dL (04-16-25 @ 21:00)  POCT Blood Glucose.: 97 mg/dL (04-16-25 @ 17:16)    PHYSICAL EXAM  GENERAL  ( x ) NAD, lying in bed comfortably     (  ) obtunded     (  ) lethargic     (  ) somnolent    HEAD  ( x ) Atraumatic     (  ) hematoma     (  ) laceration (specify location:       )     NECK  ( x ) Supple     (  ) neck stiffness     (  ) nuchal rigidity     (  )  no JVD     (  ) JVD present ( -- cm)    HEART  Rate -->  ( x ) normal rate    (  ) bradycardic    (  ) tachycardic  Rhythm -->  ( x ) regular    (  ) regularly irregular    (  ) irregularly irregular  Murmurs -->  ( x ) normal s1/s2    (  ) systolic murmur    (  ) diastolic murmur    (  ) continuous murmur     (  ) S3 present    (  ) S4 present    LUNGS  Trach collar  ( x )Unlabored respirations     (  ) tachypnea  ( x ) B/L air entry     (  ) decreased breath sounds in:  (location     )    ( x ) no adventitious sound     (  ) crackles     (  ) wheezing      (  ) rhonchi      (specify location:       )  (  ) chest wall tenderness (specify location:       )    ABDOMEN  PEG tube  ( x ) Soft     (  ) tense   |   ( x ) nondistended     (  ) distended   |   (  ) +BS     (  ) hypoactive bowel sounds     (  ) hyperactive bowel sounds  ( x ) nontender     (  ) RUQ tenderness     (  ) RLQ tenderness     (  ) LLQ tenderness     (  ) epigastric tenderness     (  ) diffuse tenderness  (  ) Splenomegaly      (  ) Hepatomegaly      (  ) Jaundice     (  ) ecchymosis     EXTREMITIES  (x  ) Normal     (  ) Rash     (  ) ecchymosis     (  ) varicose veins      (  ) pitting edema     (  ) non-pitting edema   (  ) ulceration     (  ) gangrene:     (location:     )    NERVOUS SYSTEM  ( x ) A&Ox3     (  ) confused     (  ) lethargic  CN II-XII:     (  ) Intact     (  ) focal deficits  (Specify:     )   Upper extremities:     (  ) strength X/5     (  ) focal deficit (specify:    )  Lower extremities:     (  ) strength  X/5    (  ) focal deficit (specify:    )      LABS             11.1   9.82  )-----------( 198      ( 04-17-25 @ 05:02 )             34.1     145  |  112  |  9   -------------------------<  84   04-17-25 @ 05:02  3.4  |  25  |  0.6    Ca      11.0     04-17-25 @ 05:02  Phos   1.8     04-17-25 @ 05:02  Mg     1.6     04-17-25 @ 05:02    TPro  4.8  /  Alb  3.1  /  TBili  1.0  /  DBili  x   /  AST  11  /  ALT  15  /  AlkPhos  90  /  GGT  x     04-17-25 @ 05:02        Urinalysis Basic - ( 17 Apr 2025 05:02 )    Color: x / Appearance: x / SG: x / pH: x  Gluc: 84 mg/dL / Ketone: x  / Bili: x / Urobili: x   Blood: x / Protein: x / Nitrite: x   Leuk Esterase: x / RBC: x / WBC x   Sq Epi: x / Non Sq Epi: x / Bacteria: x          IMAGING

## 2025-04-17 NOTE — PROGRESS NOTE ADULT - ASSESSMENT
73 yo F with PMHx Afib on Xarelto, DM2, HTN, congential solitary kidney, recent ICU admission in 11/2024 for septic shock 2/2 ESBL proteus bacteremia s/p trach/PEJ now on trach collar @6LPM presenting from NH for abnormal lab values. Patient is awake, alert, responds to questions, follows commands. She feels fine with no acute complaints. Per NH paperwork patient was sent in for Ca 12.6 and needs midline. Daughter at bedside confirmed and stated they wanted to give her IVF but had no vascular access.     # Hypercalcemia (Improved)  # Primary Hyperparathyroidism   - Ca 13.1 (Corrected 12.8) on admission   - s/p 1L LR bolus in ED   -   - Vit D 30  - S/p  IVF with no improvement   - F/u PTHrP  - Parathyroid US: Limited/nondiagnostic study given tracheostomy.  - pt is on MV and cholecalciferol at NH, hold for now  - Endo Cs: Sensipar 30 mg BID, calcium level BID till below 11, Will need further ESCOBAR was OP (24-hour urine calcium, and bone density scan,  ENT eval)  - Per Endo, add calcitonin & zoledronic acid  - 4/17: Calcium improved (13.5->11)    #diarrhea  - Pt has diarrhea since admission  - KUB to r/o overflow diarrhea   - GI PCR -ve  - Will monitor for next 24h    #Hypokalemia (improved)  - K 2.8 on admission   - EKG: Afib HR 71, with PVCs  - Replete as needed     # Hypomagnesemia  (improved)  - Mg 1.4 on admission   - Replete as needed     #chronic Afib   - on Xarelto   - Last TTE 11/05/24: EF 71%, hyperdynamic, LA enlargement, fibrocalcific aortic valve w/ moderate AS, trivial pericardial effusion  - off telemetry now     #DM  - ISS for now, target -180  - Monitor FS and adjust accordingly     #HTN  - C/w home losartan 100mg    #Critical illness 11/2024 s/p Trach + PEJ   #Congenital solitary kidney  - Continue PEJ feeds   - Trach care       #DVT ppx: Xarelto  #GI ppx: None  #Diet: Tube feeds, nutrition cs  #Activity: IAT  #Dispo: From [facility],   # Pending: monitor diarrhea

## 2025-04-17 NOTE — CHART NOTE - NSCHARTNOTEFT_GEN_A_CORE
Registered Dietitian Follow-Up     Patient Profile Reviewed                           Yes [x]   No []     Nutrition History Previously Obtained        Yes [x]  No []       Pertinent Subjective Information:  RD spoke with Patient and RN this morning. Patient only consumed 2 juices this morning at breakfast meal. RN was feeding patient lunch meal at time of RD visit - had consumed a few spoons of lunch while RD was present. Patient is agreeable to oral nutrition supplement.      Pertinent Medical Interventions:  73 yo F with PMHx Afib on Xarelto, DM2, HTN, congential solitary kidney, recent ICU admission in 11/2024 for septic shock 2/2 ESBL proteus bacteremia s/p trach/PEJ now on trach collar @6LPM presenting from NH for abnormal lab values. Patient is awake, alert, responds to questions, follows commands. She feels fine with no acute complaints. Per NH paperwork patient was sent in for Ca 12.6 and needs midline.  # Hypercalcemia (Improved)  # Primary Hyperparathyroidism   #diarrhea  - Pt has diarrhea since admission  - KUB to r/o overflow diarrhea   - GI PCR -ve  - Will monitor for next 24h  #Hypokalemia (improved)  - K 2.8 on admission   - EKG: Afib HR 71, with PVCs  - Replete as needed   # Hypomagnesemia  (improved)  - Mg 1.4 on admission   - Replete as needed   #chronic Afib   - on Xarelto   #DM  - ISS for now, target -180  - Monitor FS and adjust accordingly   #HTN  - C/w home losartan 100mg  #Congenital solitary kidney    Per SLP eval 4/14:  recommending to continue puree texture with moderately thick liquids w/o overt s/s aspiration /penetration; mild oral dysphagia  oral hygiene; position upright (90 degrees); small sips/bites; PMSV must be worn w/ all PO  frequent cues/help required  1:1 feed    UBW: 90.2 kg (11/20/24)  vs current weight 82.1 kg (4/14/25)  9.9% weight loss x 5 months     Diet order:   Diet, Pureed:   Moderately Thick Liquids (MODTHICKLIQS) (04-11-25 @ 19:22) [Active]    Anthropometrics:  Height (cm): 167.6 (04-11-25 @ 13:47)  Weight (kg): 82.1 (04-14-25 @ 09:41)  BMI (kg/m2): 29.2 (04-14-25 @ 09:41)  IBW: 64.5 kg     MEDICATIONS  (STANDING):  chlorhexidine 2% Cloths 1 Application(s) Topical <User Schedule>  cinacalcet suspension 30 milliGRAM(s) Oral two times a day  dextrose 5%. 1000 milliLiter(s) (50 mL/Hr) IV Continuous <Continuous>  dextrose 5%. 1000 milliLiter(s) (100 mL/Hr) IV Continuous <Continuous>  dextrose 50% Injectable 25 Gram(s) IV Push once  dextrose 50% Injectable 12.5 Gram(s) IV Push once  dextrose 50% Injectable 25 Gram(s) IV Push once  glucagon  Injectable 1 milliGRAM(s) IntraMuscular once  insulin lispro (ADMELOG) corrective regimen sliding scale   SubCutaneous three times a day before meals  losartan 100 milliGRAM(s) Oral daily  rivaroxaban 20 milliGRAM(s) Oral with dinner  sodium chloride 0.9%. 1000 milliLiter(s) (100 mL/Hr) IV Continuous <Continuous>    MEDICATIONS  (PRN):  albuterol    90 MICROgram(s) HFA Inhaler 2 Puff(s) Inhalation every 6 hours PRN Shortness of Breath and/or Wheezing  dextrose Oral Gel 15 Gram(s) Oral once PRN Blood Glucose LESS THAN 70 milliGRAM(s)/deciliter  melatonin 3 milliGRAM(s) Oral at bedtime PRN Insomnia    Pertinent Labs: 04-17 @ 05:02: Na 145, BUN 9[L], Cr 0.6[L], BG 84, K+ 3.4[L], Phos 1.8[L], Mg 1.6[L], Alk Phos 90, ALT/SGPT 15, AST/SGOT 11, HbA1c --    Finger Sticks:  POCT Blood Glucose.: 88 mg/dL (04-17 @ 16:52)  POCT Blood Glucose.: 92 mg/dL (04-17 @ 11:08)  POCT Blood Glucose.: 83 mg/dL (04-17 @ 07:49)  POCT Blood Glucose.: 101 mg/dL (04-16 @ 21:00)    Physical Findings:  - Appearance: AAOx4    - GI function: 3x BM on 4/16   - Tubes: PEJ   - Oral/Mouth cavity: pureed texture, moderately thick liquids   - Skin: no pressure injuries documented   - Edema: no edema documented      Nutrition Requirements:  Weight Used: 82.1 kg current dosing weight - estimated needs with consideration for age, BMI, acuity of illness     Estimated Energy Needs    Continue []  Adjust [x]  1642 - 2053 kcal/day (20 - 25 kcal/kg)     Estimated Protein Needs    Continue []  Adjust [x]  98 - 107 gm/day (1.2 - 1.3 gm/kg)     Estimated Fluid Needs        Continue []  Adjust [x]  1 mL/kcal      Nutrient Intake: Patient noted with poor PO intake of meals      [x] Previous Nutrition Diagnosis:  Inadequate Energy Intake             [x] Ongoing          [] Resolved    [x] New Nutrition Diagnosis:  Severe Malnutrition in the context of acute illness (abnormal lab values, diarrhea since admission) as evidenced by energy intake </=50% of estimated energy requirement for >/=5 days, weight loss >7.5% x 3 months     Nutrition Education:   discussed oral nutrition supplements and benefits      Nutrition Intervention:  meals and snacks, medical food supplements, coordination of care     Goal/Expected Outcome:   Patient to tolerate diet and have PO intake >/=50% of meals within 3 days      Indicator/Monitoring:   energy intake, weight, labs, skin status, NFPF, BM, GI s/s      Recommendation:   1) Continue current diet order with SLP recs for texture and consistency - recommend to ADD Consistent CHO modifier due to hx of DM  2) ADD Prosource Gelatein 2x/day (80 kcal, 20 gm Protein each) and Glucerna Shake 2x/day (220 kcal, 10 gm Protein each) - to be mixed with either 4 pumps of moderate thickener or 2 packets of moderate thickener (yellow packet)  3) 1:1 feeding assistance and encouragement with all meals, snacks, supplement  4) Monitor electrolytes, BG, renal profile  -noted with low K+, Mg, Phosphorus - replete PRN and consider the addition of thiamine 300 mg x 5-7 days (for possible refeeding syndrome)  -adjust insulin regimen PRN  5) Monitor BM, GI s/s   -consider the addition of a probiotic due to diarrhea since admission    Patient is at high nutrition risk, RD to f/u  Denita David RD [phone] or via Teams Registered Dietitian Follow-Up     Patient Profile Reviewed                           Yes [x]   No []     Nutrition History Previously Obtained        Yes [x]  No []       Pertinent Subjective Information:  RD spoke with Patient and RN this morning. Patient only consumed 2 juices this morning at breakfast meal. RN was feeding patient lunch meal at time of RD visit - had consumed a few spoons of lunch while RD was present. Patient is agreeable to oral nutrition supplement.      Pertinent Medical Interventions:  73 yo F with PMHx Afib on Xarelto, DM2, HTN, congential solitary kidney, recent ICU admission in 11/2024 for septic shock 2/2 ESBL proteus bacteremia s/p trach/PEJ now on trach collar @6LPM presenting from NH for abnormal lab values. Patient is awake, alert, responds to questions, follows commands. She feels fine with no acute complaints. Per NH paperwork patient was sent in for Ca 12.6 and needs midline.  # Hypercalcemia (Improved)  # Primary Hyperparathyroidism   #diarrhea  - Pt has diarrhea since admission  - KUB to r/o overflow diarrhea   - GI PCR -ve  - Will monitor for next 24h  #Hypokalemia (improved)  - K 2.8 on admission   - EKG: Afib HR 71, with PVCs  - Replete as needed   # Hypomagnesemia  (improved)  - Mg 1.4 on admission   - Replete as needed   #chronic Afib   - on Xarelto   #DM  - ISS for now, target -180  - Monitor FS and adjust accordingly   #HTN  - C/w home losartan 100mg  #Congenital solitary kidney    Per SLP eval 4/14:  recommending to continue puree texture with moderately thick liquids w/o overt s/s aspiration /penetration; mild oral dysphagia  oral hygiene; position upright (90 degrees); small sips/bites; PMSV must be worn w/ all PO  frequent cues/help required  1:1 feed    UBW: 90.2 kg (11/20/24)  vs current weight 82.1 kg (4/14/25)  9.9% weight loss x 5 months     Diet order:   Diet, Pureed:   Moderately Thick Liquids (MODTHICKLIQS) (04-11-25 @ 19:22) [Active]    Anthropometrics:  Height (cm): 167.6 (04-11-25 @ 13:47)  Weight (kg): 82.1 (04-14-25 @ 09:41)  BMI (kg/m2): 29.2 (04-14-25 @ 09:41)  IBW: 64.5 kg     MEDICATIONS  (STANDING):  chlorhexidine 2% Cloths 1 Application(s) Topical <User Schedule>  cinacalcet suspension 30 milliGRAM(s) Oral two times a day  dextrose 5%. 1000 milliLiter(s) (50 mL/Hr) IV Continuous <Continuous>  dextrose 5%. 1000 milliLiter(s) (100 mL/Hr) IV Continuous <Continuous>  dextrose 50% Injectable 25 Gram(s) IV Push once  dextrose 50% Injectable 12.5 Gram(s) IV Push once  dextrose 50% Injectable 25 Gram(s) IV Push once  glucagon  Injectable 1 milliGRAM(s) IntraMuscular once  insulin lispro (ADMELOG) corrective regimen sliding scale   SubCutaneous three times a day before meals  losartan 100 milliGRAM(s) Oral daily  rivaroxaban 20 milliGRAM(s) Oral with dinner  sodium chloride 0.9%. 1000 milliLiter(s) (100 mL/Hr) IV Continuous <Continuous>    MEDICATIONS  (PRN):  albuterol    90 MICROgram(s) HFA Inhaler 2 Puff(s) Inhalation every 6 hours PRN Shortness of Breath and/or Wheezing  dextrose Oral Gel 15 Gram(s) Oral once PRN Blood Glucose LESS THAN 70 milliGRAM(s)/deciliter  melatonin 3 milliGRAM(s) Oral at bedtime PRN Insomnia    Pertinent Labs: 04-17 @ 05:02: Na 145, BUN 9[L], Cr 0.6[L], BG 84, K+ 3.4[L], Phos 1.8[L], Mg 1.6[L], Alk Phos 90, ALT/SGPT 15, AST/SGOT 11, HbA1c --    Finger Sticks:  POCT Blood Glucose.: 88 mg/dL (04-17 @ 16:52)  POCT Blood Glucose.: 92 mg/dL (04-17 @ 11:08)  POCT Blood Glucose.: 83 mg/dL (04-17 @ 07:49)  POCT Blood Glucose.: 101 mg/dL (04-16 @ 21:00)    Physical Findings:  - Appearance: AAOx4    - GI function: 3x BM on 4/16   - Tubes: PEJ   - Oral/Mouth cavity: pureed texture, moderately thick liquids   - Skin: no pressure injuries documented   - Edema: no edema documented      Nutrition Requirements:  Weight Used: 82.1 kg current dosing weight - estimated needs with consideration for age, BMI, acuity of illness, PCM     Estimated Energy Needs    Continue []  Adjust [x]  2052 - 2463 kcal/day (25-30 kcal/kg)     Estimated Protein Needs    Continue []  Adjust [x]  98 - 123 gm/day (1.2 - 1.5 gm/kg)      Estimated Fluid Needs        Continue []  Adjust [x]  1 mL/kcal      Nutrient Intake: Patient noted with poor PO intake of meals      [x] Previous Nutrition Diagnosis:  Inadequate Energy Intake             [x] Ongoing          [] Resolved    [x] New Nutrition Diagnosis:  Severe Malnutrition in the context of acute illness (abnormal lab values, diarrhea since admission) as evidenced by energy intake </=50% of estimated energy requirement for >/=5 days, weight loss >7.5% x 3 months     Nutrition Education:   discussed oral nutrition supplements and benefits      Nutrition Intervention:  meals and snacks, medical food supplements, coordination of care     Goal/Expected Outcome:   Patient to tolerate diet and have PO intake >/=50% of meals within 3 days      Indicator/Monitoring:   energy intake, weight, labs, skin status, NFPF, BM, GI s/s      Recommendation:   1) Continue current diet order with SLP recs for texture and consistency - Patient's BG seems well controlled at this time - will not add Consistent CHO modifier at this time in order to liberalize diet - however will recommend to restrict desserts to Sugar Free only   2) ADD Prosource Gelatein 2x/day (80 kcal, 20 gm Protein each) and Glucerna Shake 2x/day (220 kcal, 10 gm Protein each) - to be mixed with either 4 pumps of moderate thickener or 2 packets of moderate thickener (yellow packet)  3) 1:1 feeding assistance and encouragement with all meals, snacks, supplement  -if Patient's PO intake remains consistently poor, will need to consider supplemental tube feeds or switching back to tube feeds completely (please consult Registered Dietitian PRN)  4) Monitor electrolytes, BG, renal profile  -noted with low K+, Mg, Phosphorus - replete PRN and consider the addition of thiamine 300 mg x 5-7 days (for possible refeeding syndrome)  -adjust insulin regimen PRN  5) Monitor BM, GI s/s   -consider the addition of a probiotic due to diarrhea since admission    Patient is at high nutrition risk, RD to f/u  Denita David RD [phone] or via Teams

## 2025-04-17 NOTE — DIETITIAN NUTRITION RISK NOTIFICATION - TREATMENT: THE FOLLOWING DIET HAS BEEN RECOMMENDED
Diet, Pureed:   Moderately Thick Liquids (MODTHICKLIQS)  Prosource Gelatein 20 Sugar Free     Qty per Day:  2  Supplement Feeding Modality:  Oral  Glucerna Shake Cans or Servings Per Day:  1       Frequency:  Two Times a day (04-17-25 @ 19:29) [Pending Verification By Attending]    Patient with severe malnutrition in the context of acute illness as evidenced by energy intake </=50% of estimated energy requirement for >/=5 days and >7.5% weight loss x 3 months

## 2025-04-18 ENCOUNTER — TRANSCRIPTION ENCOUNTER (OUTPATIENT)
Age: 75
End: 2025-04-18

## 2025-04-18 VITALS
TEMPERATURE: 97 F | HEART RATE: 98 BPM | OXYGEN SATURATION: 97 % | DIASTOLIC BLOOD PRESSURE: 69 MMHG | RESPIRATION RATE: 18 BRPM | SYSTOLIC BLOOD PRESSURE: 106 MMHG

## 2025-04-18 LAB
ALBUMIN SERPL ELPH-MCNC: 2.7 G/DL — LOW (ref 3.5–5.2)
ALP SERPL-CCNC: 87 U/L — SIGNIFICANT CHANGE UP (ref 30–115)
ALT FLD-CCNC: 12 U/L — SIGNIFICANT CHANGE UP (ref 0–41)
ANION GAP SERPL CALC-SCNC: 8 MMOL/L — SIGNIFICANT CHANGE UP (ref 7–14)
AST SERPL-CCNC: 10 U/L — SIGNIFICANT CHANGE UP (ref 0–41)
BASOPHILS # BLD AUTO: 0.05 K/UL — SIGNIFICANT CHANGE UP (ref 0–0.2)
BASOPHILS NFR BLD AUTO: 0.5 % — SIGNIFICANT CHANGE UP (ref 0–1)
BILIRUB SERPL-MCNC: 1 MG/DL — SIGNIFICANT CHANGE UP (ref 0.2–1.2)
BUN SERPL-MCNC: 9 MG/DL — LOW (ref 10–20)
CALCIUM SERPL-MCNC: 9.2 MG/DL — SIGNIFICANT CHANGE UP (ref 8.4–10.5)
CHLORIDE SERPL-SCNC: 115 MMOL/L — HIGH (ref 98–110)
CO2 SERPL-SCNC: 24 MMOL/L — SIGNIFICANT CHANGE UP (ref 17–32)
CREAT SERPL-MCNC: 0.6 MG/DL — LOW (ref 0.7–1.5)
EGFR: 95 ML/MIN/1.73M2 — SIGNIFICANT CHANGE UP
EGFR: 95 ML/MIN/1.73M2 — SIGNIFICANT CHANGE UP
EOSINOPHIL # BLD AUTO: 0.15 K/UL — SIGNIFICANT CHANGE UP (ref 0–0.7)
EOSINOPHIL NFR BLD AUTO: 1.6 % — SIGNIFICANT CHANGE UP (ref 0–8)
GLUCOSE BLDC GLUCOMTR-MCNC: 86 MG/DL — SIGNIFICANT CHANGE UP (ref 70–99)
GLUCOSE BLDC GLUCOMTR-MCNC: 93 MG/DL — SIGNIFICANT CHANGE UP (ref 70–99)
GLUCOSE SERPL-MCNC: 81 MG/DL — SIGNIFICANT CHANGE UP (ref 70–99)
HCT VFR BLD CALC: 30.5 % — LOW (ref 37–47)
HGB BLD-MCNC: 10.1 G/DL — LOW (ref 12–16)
IMM GRANULOCYTES NFR BLD AUTO: 0.3 % — SIGNIFICANT CHANGE UP (ref 0.1–0.3)
LYMPHOCYTES # BLD AUTO: 1.75 K/UL — SIGNIFICANT CHANGE UP (ref 1.2–3.4)
LYMPHOCYTES # BLD AUTO: 18.9 % — LOW (ref 20.5–51.1)
MAGNESIUM SERPL-MCNC: 1.6 MG/DL — LOW (ref 1.8–2.4)
MCHC RBC-ENTMCNC: 28.3 PG — SIGNIFICANT CHANGE UP (ref 27–31)
MCHC RBC-ENTMCNC: 33.1 G/DL — SIGNIFICANT CHANGE UP (ref 32–37)
MCV RBC AUTO: 85.4 FL — SIGNIFICANT CHANGE UP (ref 81–99)
MONOCYTES # BLD AUTO: 0.8 K/UL — HIGH (ref 0.1–0.6)
MONOCYTES NFR BLD AUTO: 8.6 % — SIGNIFICANT CHANGE UP (ref 1.7–9.3)
NEUTROPHILS # BLD AUTO: 6.5 K/UL — SIGNIFICANT CHANGE UP (ref 1.4–6.5)
NEUTROPHILS NFR BLD AUTO: 70.1 % — SIGNIFICANT CHANGE UP (ref 42.2–75.2)
NRBC BLD AUTO-RTO: 0 /100 WBCS — SIGNIFICANT CHANGE UP (ref 0–0)
PLATELET # BLD AUTO: 192 K/UL — SIGNIFICANT CHANGE UP (ref 130–400)
PMV BLD: 12 FL — HIGH (ref 7.4–10.4)
POTASSIUM SERPL-MCNC: 3.3 MMOL/L — LOW (ref 3.5–5)
POTASSIUM SERPL-SCNC: 3.3 MMOL/L — LOW (ref 3.5–5)
PROT SERPL-MCNC: 4.7 G/DL — LOW (ref 6–8)
RBC # BLD: 3.57 M/UL — LOW (ref 4.2–5.4)
RBC # FLD: 15.9 % — HIGH (ref 11.5–14.5)
SODIUM SERPL-SCNC: 147 MMOL/L — HIGH (ref 135–146)
WBC # BLD: 9.28 K/UL — SIGNIFICANT CHANGE UP (ref 4.8–10.8)
WBC # FLD AUTO: 9.28 K/UL — SIGNIFICANT CHANGE UP (ref 4.8–10.8)

## 2025-04-18 PROCEDURE — 99239 HOSP IP/OBS DSCHRG MGMT >30: CPT

## 2025-04-18 RX ORDER — SODIUM CHLORIDE 9 G/1000ML
250 INJECTION, SOLUTION INTRAVENOUS
Refills: 0 | Status: DISCONTINUED | OUTPATIENT
Start: 2025-04-18 | End: 2025-04-18

## 2025-04-18 RX ORDER — MAGNESIUM SULFATE 500 MG/ML
2 SYRINGE (ML) INJECTION ONCE
Refills: 0 | Status: COMPLETED | OUTPATIENT
Start: 2025-04-18 | End: 2025-04-18

## 2025-04-18 RX ADMIN — Medication 40 MILLIEQUIVALENT(S): at 12:32

## 2025-04-18 RX ADMIN — Medication 25 GRAM(S): at 09:46

## 2025-04-18 RX ADMIN — Medication 1 APPLICATION(S): at 06:39

## 2025-04-18 RX ADMIN — LOSARTAN POTASSIUM 100 MILLIGRAM(S): 100 TABLET, FILM COATED ORAL at 06:39

## 2025-04-18 RX ADMIN — Medication 25 GRAM(S): at 12:32

## 2025-04-18 RX ADMIN — Medication 40 MILLIEQUIVALENT(S): at 09:46

## 2025-04-18 RX ADMIN — SODIUM CHLORIDE 100 MILLILITER(S): 9 INJECTION, SOLUTION INTRAVENOUS at 12:30

## 2025-04-18 NOTE — DISCHARGE NOTE PROVIDER - NSDCFUADDINST_GEN_ALL_CORE_FT
pt has a jejunostomy tube inserted in 2024 but pt is on puree diet and is able to tolerate.   diet : puree with 1:1 feed  moderately thick liquids   prosource 2 cans per day

## 2025-04-18 NOTE — PROGRESS NOTE ADULT - SUBJECTIVE AND OBJECTIVE BOX
Patient is a 74y old  Female who presents with a chief complaint of Abnormal labs (17 Apr 2025 11:31)      INTERVAL HPI/OVERNIGHT EVENTS: no overnight events    albuterol    90 MICROgram(s) HFA Inhaler 2 Puff(s) Inhalation every 6 hours PRN  chlorhexidine 2% Cloths 1 Application(s) Topical <User Schedule>  cinacalcet suspension 30 milliGRAM(s) Oral two times a day  dextrose 5%. 1000 milliLiter(s) IV Continuous <Continuous>  dextrose 5%. 1000 milliLiter(s) IV Continuous <Continuous>  dextrose 50% Injectable 25 Gram(s) IV Push once  dextrose 50% Injectable 12.5 Gram(s) IV Push once  dextrose 50% Injectable 25 Gram(s) IV Push once  dextrose Oral Gel 15 Gram(s) Oral once PRN  glucagon  Injectable 1 milliGRAM(s) IntraMuscular once  insulin lispro (ADMELOG) corrective regimen sliding scale   SubCutaneous three times a day before meals  losartan 100 milliGRAM(s) Oral daily  melatonin 3 milliGRAM(s) Oral at bedtime PRN  rivaroxaban 20 milliGRAM(s) Oral with dinner  sodium chloride 0.9%. 1000 milliLiter(s) IV Continuous <Continuous>      REVIEW OF SYSTEMS:  CONSTITUTIONAL: No fever, chills  EYES: No eye pain, visual disturbances, or discharge  ENMT:  No difficulty hearing, tinnitus, vertigo; No sinus or throat pain  RESPIRATORY: No cough, wheezing, chills or hemoptysis; No shortness of breath  CARDIOVASCULAR: No chest pain, palpitations  GASTROINTESTINAL: No abdominal pain. No nausea, vomiting, or diarrhea  GENITOURINARY: No dysuria  NEUROLOGICAL: No HA  SKIN: No itching, burning, rashes, or lesions   ENDOCRINE: No heat or cold intolerance; No hair loss  PSYCHIATRIC: No depression, anxiety, mood swings, or difficulty sleeping      T(C): 36.4 (04-18-25 @ 05:00), Max: 36.8 (04-17-25 @ 21:15)  HR: 76 (04-18-25 @ 05:00) (63 - 79)  BP: 135/83 (04-18-25 @ 05:00) (135/83 - 151/74)  RR: 18 (04-18-25 @ 09:20) (17 - 18)  SpO2: 97% (04-18-25 @ 09:20) (94% - 97%)  Wt(kg): --Vital Signs Last 24 Hrs  T(C): 36.4 (18 Apr 2025 05:00), Max: 36.8 (17 Apr 2025 21:15)  T(F): 97.6 (18 Apr 2025 05:00), Max: 98.3 (17 Apr 2025 21:15)  HR: 76 (18 Apr 2025 05:00) (63 - 79)  BP: 135/83 (18 Apr 2025 05:00) (135/83 - 151/74)  BP(mean): --  RR: 18 (18 Apr 2025 09:20) (17 - 18)  SpO2: 97% (18 Apr 2025 09:20) (94% - 97%)    Parameters below as of 18 Apr 2025 09:20  Patient On (Oxygen Delivery Method): tracheostomy collar  O2 Flow (L/min): 7  O2 Concentration (%): 35    PHYSICAL EXAM:  GENERAL: NAD, well-groomed, well-developed  HEAD:  Atraumatic, Normocephalic  EYES: EOMI, PERRLA, conjunctiva and sclera clear  ENMT: No tonsillar erythema, exudates, or enlargement; Moist mucous membranes  NECK: Supple, No JVD, Normal thyroid  CHEST/LUNG: Clear to auscultation bilaterally; No rales, rhonchi, wheezing, or rubs  HEART: Regular rate and rhythm; No murmurs, rubs, or gallops  ABDOMEN: Soft, Nontender, Nondistended; Bowel sounds present  NEURO: Alert & Oriented X3  EXTREMITIES: No LE edema, no calf tenderness      Consultant(s) Notes Reviewed:  [x ] YES  [ ] NO  Care Discussed with Consultants/Other Providers [ x] YES  [ ] NO    LABS:                        10.1   9.28  )-----------( 192      ( 18 Apr 2025 06:02 )             30.5     04-18    147[H]  |  115[H]  |  9[L]  ----------------------------<  81  3.3[L]   |  24  |  0.6[L]    Ca    9.2      18 Apr 2025 06:02  Phos  1.8     04-17  Mg     1.6     04-18    TPro  4.7[L]  /  Alb  2.7[L]  /  TBili  1.0  /  DBili  x   /  AST  10  /  ALT  12  /  AlkPhos  87  04-18      Urinalysis Basic - ( 18 Apr 2025 06:02 )    Color: x / Appearance: x / SG: x / pH: x  Gluc: 81 mg/dL / Ketone: x  / Bili: x / Urobili: x   Blood: x / Protein: x / Nitrite: x   Leuk Esterase: x / RBC: x / WBC x   Sq Epi: x / Non Sq Epi: x / Bacteria: x      CAPILLARY BLOOD GLUCOSE      POCT Blood Glucose.: 86 mg/dL (18 Apr 2025 07:46)  POCT Blood Glucose.: 96 mg/dL (17 Apr 2025 22:12)  POCT Blood Glucose.: 100 mg/dL (17 Apr 2025 21:04)  POCT Blood Glucose.: 88 mg/dL (17 Apr 2025 16:52)  POCT Blood Glucose.: 92 mg/dL (17 Apr 2025 11:08)        Urinalysis Basic - ( 18 Apr 2025 06:02 )    Color: x / Appearance: x / SG: x / pH: x  Gluc: 81 mg/dL / Ketone: x  / Bili: x / Urobili: x   Blood: x / Protein: x / Nitrite: x   Leuk Esterase: x / RBC: x / WBC x   Sq Epi: x / Non Sq Epi: x / Bacteria: x          RADIOLOGY & ADDITIONAL TESTS:    Imaging Personally Reviewed:  [ ] YES  [ ] NO

## 2025-04-18 NOTE — PROGRESS NOTE ADULT - REASON FOR ADMISSION
Abnormal labs

## 2025-04-18 NOTE — PROGRESS NOTE ADULT - ASSESSMENT
73 yo F with PMHx Afib on Xarelto, DM2, HTN, congential solitary kidney, recent ICU admission in 11/2024 for septic shock 2/2 ESBL proteus bacteremia s/p trach/PEJ now on trach collar @6LPM presenting from NH for abnormal lab values. Patient is awake, alert, responds to questions, follows commands. She feels fine with no acute complaints. Per NH paperwork patient was sent in for Ca 12.6 and needs midline. Daughter at bedside confirmed and stated they wanted to give her IVF but had no vascular access.     # Hypercalcemia (Improved)  # Primary Hyperparathyroidism   - Ca 13.1 (Corrected 12.8) on admission   - s/p 1L LR bolus in ED   -   - Vit D 30  - S/p  IVF with no improvement   - F/u PTHrP  - Parathyroid US: Limited/nondiagnostic study given tracheostomy.  - pt is on MV and cholecalciferol at NH, hold for now  - Endo Cs: Sensipar 30 mg BID, calcium level BID till below 11, Will need further ESCOBAR was OP (24-hour urine calcium, and bone density scan,  ENT eval)  - Per Endo, add calcitonin & zoledronic acid  - 4/17: Calcium improved (13.5->11 -> 9.2 today    #diarrhea  - Pt has diarrhea since admission  - KUB to r/o overflow diarrhea   - GI PCR -ve  - Will monitor for next 24h    #Hypokalemia (improved)  - K 2.8 on admission   - EKG: Afib HR 71, with PVCs  - Replete as needed     # Hypomagnesemia  (improved)  - Mg 1.4 on admission   - Replete as needed     #chronic Afib   - on Xarelto   - Last TTE 11/05/24: EF 71%, hyperdynamic, LA enlargement, fibrocalcific aortic valve w/ moderate AS, trivial pericardial effusion  - off telemetry now     #DM  - ISS for now, target -180  - Monitor FS and adjust accordingly     #HTN  - C/w home losartan 100mg    #Critical illness 11/2024 s/p Trach + PEJ   #Congenital solitary kidney  - Continue PEJ feeds   - Trach care       #DVT ppx: Xarelto  #GI ppx: None  #Diet: Tube feeds, nutrition cs  #Activity: IAT  #Dispo: From [facility],   # Pending: monitor diarrhea

## 2025-04-18 NOTE — PROGRESS NOTE ADULT - NUTRITIONAL ASSESSMENT
This patient has been assessed with a concern for Malnutrition and has been determined to have a diagnosis/diagnoses of Severe protein-calorie malnutrition.    This patient is being managed with:   Diet Pureed-  Moderately Thick Liquids (MODTHICKLIQS)  Prosource Gelatein 20 Sugar Free     Qty per Day:  2  Supplement Feeding Modality:  Oral  Glucerna Shake Cans or Servings Per Day:  1       Frequency:  Two Times a day  Entered: Apr 17 2025  7:29PM

## 2025-04-18 NOTE — DISCHARGE NOTE PROVIDER - NSDCCAREPROVSEEN_GEN_ALL_CORE_FT
Internal Medicine siuh Burow's Advancement Flap Text: The defect edges were debeveled with a #15 scalpel blade.  Given the location of the defect and the proximity to free margins a Burow's advancement flap was deemed most appropriate.  Using a sterile surgical marker, the appropriate advancement flap was drawn incorporating the defect and placing the expected incisions within the relaxed skin tension lines where possible.    The area thus outlined was incised deep to adipose tissue with a #15 scalpel blade.  The skin margins were undermined to an appropriate distance in all directions utilizing iris scissors.

## 2025-04-18 NOTE — DISCHARGE NOTE PROVIDER - NSDCCPCAREPLAN_GEN_ALL_CORE_FT
PRINCIPAL DISCHARGE DIAGNOSIS  Diagnosis: Hyperparathyroidism, primary  Assessment and Plan of Treatment: primary hyperparathyroidism means that your parathyroid glands are too active. These are tiny glands in the neck. They sit behind the thyroid gland. They make a hormone that helps control how much calcium is in the blood. When these glands make too much hormone, the amount of calcium in the blood goes up.  Most people with this problem have no symptoms. But it can cause constipation, nausea, vomiting, fatigue, and depression. It also can lead to high blood pressure, ulcers, kidney stones, and weak bones.  This problem often is caused by a tumour on the parathyroid glands. The tumour usually is not cancer. You may need surgery to take out one or more of the glands. You were prescribed a medication called "cinacalcet" to help lower your calcium levels.  Please follow up with your endocrinologist and primary care doctor for further recommendations     PRINCIPAL DISCHARGE DIAGNOSIS  Diagnosis: Hypercalcemia  Assessment and Plan of Treatment: - high calcium levels probably are due to primary hyperparghyroidism   - please followup with endocrinology as outpt   - continue Cinacalcet to heep calcium levels wnl   - you will need repeat labs to monitor calcium level closely ( recommend checking within one week   high calcium can cause nausea, vomiting, constipation, fatigue and depression but it can also be asymptomatic   if you have any concerning symptoms seek immediate medical care         SECONDARY DISCHARGE DIAGNOSES  Diagnosis: Hypokalemia  Assessment and Plan of Treatment: - please have potassium level checked as outpt within a week   - recommended level is 4-5       Diagnosis: Hypomagnesemia  Assessment and Plan of Treatment: - please have magnesium level checked as outpt within a week  - recommended level is >2    Diagnosis: Diarrhea  Assessment and Plan of Treatment: resolved while in the hospital, if diarrhea recurs, pls seek medical care asap , diarrhea can cause low potassium and magnesium levels    Diagnosis: Tracheostomy status  Assessment and Plan of Treatment: trach care , if there is any discharge or discomfort seek medical care urgently    Diagnosis: Jejunostomy status  Assessment and Plan of Treatment: stoma care, pt is also on puree diet with 1:1     PRINCIPAL DISCHARGE DIAGNOSIS  Diagnosis: Hypercalcemia  Assessment and Plan of Treatment: - high calcium levels probably are due to primary hyperparghyroidism   - please followup with endocrinology as outpt   - continue Cinacalcet to heep calcium levels wnl   - you will need repeat labs to monitor calcium level closely ( recommend checking within one week   - avoid dehydration, cont po hydration   - high calcium can cause nausea, vomiting, constipation, fatigue and depression but it can also be asymptomatic   if you have any concerning symptoms seek immediate medical care         SECONDARY DISCHARGE DIAGNOSES  Diagnosis: Hypokalemia  Assessment and Plan of Treatment: - please have potassium level checked as outpt within a week   - recommended level is 4-5       Diagnosis: Hypomagnesemia  Assessment and Plan of Treatment: - please have magnesium level checked as outpt within a week  - recommended level is >2    Diagnosis: Diarrhea  Assessment and Plan of Treatment: resolved while in the hospital, if diarrhea recurs, pls seek medical care asap , diarrhea can cause low potassium and magnesium levels    Diagnosis: Tracheostomy status  Assessment and Plan of Treatment: trach care , if there is any discharge or discomfort seek medical care urgently    Diagnosis: Jejunostomy status  Assessment and Plan of Treatment: stoma care, pt is also on puree diet with 1:1

## 2025-04-18 NOTE — DISCHARGE NOTE NURSING/CASE MANAGEMENT/SOCIAL WORK - PATIENT PORTAL LINK FT
You can access the FollowMyHealth Patient Portal offered by [facility] by registering at the following website: [email or web address]. By joining [facility]’s FollowMyHealth portal, you will also be able to view your health information using other applications (apps) compatible with our system.

## 2025-04-18 NOTE — DISCHARGE NOTE PROVIDER - CARE PROVIDER_API CALL
Christo Melgar De  Internal Medicine  [address] [place] [id number]  Phone: [phone]  Fax: [phone]  Follow Up Time: 1 month

## 2025-04-18 NOTE — PROGRESS NOTE ADULT - PROVIDER SPECIALTY LIST ADULT
Hospitalist
Hospitalist
Internal Medicine
Endocrinology
Endocrinology
Hospitalist
Hospitalist
Internal Medicine
Hospitalist

## 2025-04-18 NOTE — DISCHARGE NOTE PROVIDER - NSDCMRMEDTOKEN_GEN_ALL_CORE_FT
albuterol 90 mcg/inh inhalation aerosol: 2 puff(s) inhaled every 6 hours As needed Shortness of Breath and/or Wheezing  alendronate 70 mg oral tablet: 1 tab(s) orally once a week  cholecalciferol oral tablet: 400 unit(s) orally once a day  loperamide 2 mg oral capsule: 1 cap(s) orally every 12 hours as needed for  diarrhea stop after 7 days  losartan 100 mg oral tablet: 1 tab(s) orally once a day  magnesium hydroxide 400 mg oral tablet, chewable: orally once a day  Multiple Vitamins with Minerals oral liquid: 10 milliliter(s) by jejunostomy tube once a day  Potassium Chloride (Eqv-Klor-Con M20) 20 mEq oral tablet, extended release: 1 tab(s) orally once a day  rivaroxaban 15 mg oral tablet: 1 tab(s) orally once a day   albuterol 90 mcg/inh inhalation aerosol: 2 puff(s) inhaled every 6 hours As needed Shortness of Breath and/or Wheezing  alendronate 70 mg oral tablet: 1 tab(s) orally once a week  cholecalciferol oral tablet: 400 unit(s) orally once a day  losartan 100 mg oral tablet: 1 tab(s) orally once a day  Multiple Vitamins with Minerals oral liquid: 10 milliliter(s) by jejunostomy tube once a day  rivaroxaban 15 mg oral tablet: 1 tab(s) orally once a day

## 2025-04-18 NOTE — DISCHARGE NOTE PROVIDER - CARE PROVIDERS DIRECT ADDRESSES
[email or web address]

## 2025-04-18 NOTE — DISCHARGE NOTE PROVIDER - ATTENDING DISCHARGE PHYSICAL EXAMINATION:
T(F): 97.6 (04-18-25 @ 05:00), Max: 98.3 (04-17-25 @ 21:15)  HR: 76 (04-18-25 @ 05:00) (63 - 79)  BP: 135/83 (04-18-25 @ 05:00) (135/83 - 151/74)  RR: 18 (04-18-25 @ 09:20) (17 - 18)  SpO2: 97% (04-18-25 @ 09:20) (94% - 97%)  Physical exam:   constitutional NAD, AA, Respiratory  lungs bilat air entery , CVS heart RRR, GI: abdomen Soft NT, ND, BS+, skin: trach and pej sites clean

## 2025-04-18 NOTE — DISCHARGE NOTE PROVIDER - HOSPITAL COURSE
75 yo F with PMHx Afib on xarelto, DM2, HLD, HTN, congential solitary kidney, recent ICU admission in 11/2024 for septic shock 2/2 ESBL proteus bacteremia s/p trach/PEJ now on trach collar @6LPM presenting from [facility] for abnormal lab values. Patient is awake, alert, responds to questions, follows commands. She feels fine with no acute complaints. Per NH paperwork patient was sent in for Ca 12.6 and needs midline. Daughter at bedside confirmed and stated they wanted to give her IVF but had no vascular access.   Patient denies fevers, chills, chest pain, SOB, abdominal pain, n/v/d/c, dysuria.    Vitals in the ED: /72, HR 69, T 98.4, RR 18, SpO2 99% on  T collar 6L   EKG: Afib HR 71, with PVCs  Labs: WBC 14, Hgb 13.2, K 2.8, Ca 13.1 (Corrected 12.8), Mg 1.4     In the ED, patient was given LR 1L bolus, Mg 2g, K 60mEq.       Endocrine consult:   - start Cinacalcet 30mg BID STAT  - would resume Vit D supplementation and Bisphosphonate upon discharge  - f/u endo o/p for dexascan  - Elevated calcium 13.2 with  consistent with primary hyperparathyroidism, vitamin D 30 within normal limits 1, 25 OH vitamin D consistent with elevated PTH, calcium elevation likely worsened by immobilization  - Started on Sensipar 30 mg twice a day 2 days ago, appears to be on Fosamax outpatient for osteoporosis, Received 1 dose of zoledronic acid 4 mg yesterday, (will take up to 72 hours to show full effect), agree with starting calcitonin 4 IU/kg every 12 hours for acute management, If calcium does not improve in 48 hours may need to increase calcitonin dose  - Continue monitoring calcium twice a day till levels are below 11 mg/dl corrected   - Can consider outpatient ENT eval for primary hyperparathyroidism neal not a good candidate for surgery considering trach/peg  - will need outpatient endo followup      # Hypercalcemia (Improved)  # Primary Hyperparathyroidism   - Ca 13.1 (Corrected 12.8) on admission   - s/p 1L LR bolus in ED   -   - Vit D 30  - S/p  IVF with no improvement   - Parathyroid US: Limited/nondiagnostic study given tracheostomy.  - Endo Cs: Sensipar 30 mg BID, calcium level BID till below 11  - Per Endo, add calcitonin & zoledronic acid  - 4/17: Calcium improved 13.5->11 -> 9.2   - outpatient endocrine follow up - will need further ESCOBAR was OP (24-hour urine calcium, and bone density scan,  ENT eval)    #diarrhea  - Pt has diarrhea since admission  - KUB to r/o overflow diarrhea   - GI PCR -ve      #Hypokalemia (improved after repletion  # Hypomagnesemia  (improved after repleation)    #chronic Afib   - on Xarelto   - Last TTE 11/05/24: EF 71%, hyperdynamic, LA enlargement, fibrocalcific aortic valve w/ moderate AS, trivial pericardial effusion    #DM  - Monitor FS and adjust accordingly     #HTN  - C/w home losartan 100mg    #Critical illness 11/2024 s/p Trach + PEJ   #Congenital solitary kidney  - Continue PEJ feeds   - Trach care       #Dispo: From [facility],              75 yo F with PMHx Afib on xarelto, DM2, HLD, HTN, congential solitary kidney, recent ICU admission in 11/2024 for septic shock 2/2 ESBL proteus bacteremia s/p trach/PEJ now on trach collar @6LPM presenting from [facility] for abnormal lab values. Patient is awake, alert, responds to questions, follows commands. She feels fine with no acute complaints. Per NH paperwork patient was sent in for Ca 12.6 and needs midline. Daughter at bedside confirmed and stated they wanted to give her IVF but had no vascular access.   Patient denies fevers, chills, chest pain, SOB, abdominal pain, n/v/d/c, dysuria.    Vitals in the ED: /72, HR 69, T 98.4, RR 18, SpO2 99% on  T collar 6L   EKG: Afib HR 71, with PVCs  Labs: WBC 14, Hgb 13.2, K 2.8, Ca 13.1 (Corrected 12.8), Mg 1.4     In the ED, patient was given LR 1L bolus, Mg 2g, K 60mEq.       Endocrine consult:   - start Cinacalcet 30mg BID STAT  - would resume Vit D supplementation and Bisphosphonate upon discharge  - Elevated calcium 13.2 with  consistent with primary hyperparathyroidism, vitamin D 30 within normal limits 1, 25 OH vitamin D consistent with elevated PTH, calcium elevation likely worsened by immobilization  - Can consider outpatient ENT eval for primary hyperparathyroidism likley not a good candidate for surgery considering trach/peg  - will need outpatient endo followup      # Hypercalcemia (Improved)  # Primary Hyperparathyroidism   - Ca 13.1 (Corrected 12.8) on admission   - s/p 1L LR bolus in ED   -   - Vit D 30  - S/p  IVF with no improvement   - Parathyroid US: Limited/nondiagnostic study given tracheostomy.  - Endo Cs: Sensipar 30 mg BID, calcium level BID till below 11  - Per Endo, add calcitonin & zoledronic acid  - 4/17: Calcium improved 13.5->11 -> 9.2   - outpatient endocrine follow up - will need further ESCOBAR was OP (24-hour urine calcium, and bone density scan,  ENT eval)    #diarrhea  - Pt has diarrhea since admission  - KUB to r/o overflow diarrhea   - GI PCR -ve      #Hypokalemia (improved after repletion  # Hypomagnesemia  (improved after repleation)    #chronic Afib   - on Xarelto   - Last TTE 11/05/24: EF 71%, hyperdynamic, LA enlargement, fibrocalcific aortic valve w/ moderate AS, trivial pericardial effusion    #DM  - Monitor FS and adjust accordingly     #HTN  - C/w home losartan 100mg    #Critical illness 11/2024 s/p Trach + PEJ   #Congenital solitary kidney  - Continue PEJ feeds   - Trach care       #Dispo: From [facility],              75 yo F with PMHx Afib on xarelto, DM2, HLD, HTN, congential solitary kidney, recent ICU admission in 11/2024 for septic shock 2/2 ESBL proteus bacteremia s/p trach/PEJ now on trach collar @6LPM presenting from [facility] for abnormal lab values. Patient is awake, alert, responds to questions, follows commands. She feels fine with no acute complaints. Per NH paperwork patient was sent in for Ca 12.6 and needs midline. Daughter at bedside confirmed and stated they wanted to give her IVF but had no vascular access.   Patient denies fevers, chills, chest pain, SOB, abdominal pain, n/v/d/c, dysuria.    Vitals in the ED: /72, HR 69, T 98.4, RR 18, SpO2 99% on  T collar 6L   EKG: Afib HR 71, with PVCs  Labs: WBC 14, Hgb 13.2, K 2.8, Ca 13.1 (Corrected 12.8), Mg 1.4     In the ED, patient was given LR 1L bolus, Mg 2g, K 60mEq.     Endocrine consult:   - start Cinacalcet 30mg BID STAT  - would resume Vit D supplementation and Bisphosphonate upon discharge  - Elevated calcium 13.2 with  consistent with primary hyperparathyroidism, vitamin D 30 within normal limits 1, 25 OH vitamin D consistent with elevated PTH, calcium elevation likely worsened by immobilization  - Can consider outpatient ENT eval for primary hyperparathyroidism likely not a good candidate for surgery considering trach/peg  - will need outpatient endo followup    # Hypercalcemia (Improved)  # Primary Hyperparathyroidism   - Ca 13.1 (Corrected 12.8) on admission   - s/p 1L LR bolus in ED   -   - Vit D 30  - S/p  IVF with no improvement   - Parathyroid US: Limited/nondiagnostic study given tracheostomy.  - Endo Cs: Sensipar 30 mg BID, calcium level BID till below 11  - Per Endo, add calcitonin & zoledronic acid  - 4/17: Calcium improved 13.5->11 -> 9.2   - outpatient endocrine follow up - will need further ESCOBAR was OP (24-hour urine calcium, and bone density scan,  ENT eval)  - repeat calcium as outpt     #diarrhea  no diarrhea now     #Hypokalemia, replace , repeat levels as outpt   # Hypomagnesemia replace , repeat levels as outpt     #chronic Afib   - on Xarelto   - Last TTE 11/05/24: EF 71%, hyperdynamic, LA enlargement, fibrocalcific aortic valve w/ moderate AS, trivial pericardial effusion    #DM  - Monitor FS and adjust accordingly     #HTN  - C/w home losartan 100mg    #Critical illness 11/2024 s/p Trach + PEJ   #Congenital solitary kidney  - Continue PEJ feeds   - Trach care     #Dispo:  [facility],   spoke with daughter on the phone

## 2025-04-18 NOTE — DISCHARGE NOTE PROVIDER - DETAILS OF MALNUTRITION DIAGNOSIS/DIAGNOSES
This patient has been assessed with a concern for Malnutrition and was treated during this hospitalization for the following Nutrition diagnosis/diagnoses:     -  04/17/2025: Severe protein-calorie malnutrition

## 2025-04-18 NOTE — DISCHARGE NOTE PROVIDER - NSDCFUSCHEDAPPT_GEN_ALL_CORE_FT
Livan Maier  [facility] PreAdmits  Scheduled Appointment: 05/08/2025    [facility]  ENDOCRIN [address]  Scheduled Appointment: 05/08/2025    Hillary Montalvo  [facility] PreAdmits  Scheduled Appointment: 06/04/2025    Hillary Montalvo  [facility]  GASTRO  [address]  Scheduled Appointment: 06/04/2025

## 2025-04-18 NOTE — DISCHARGE NOTE NURSING/CASE MANAGEMENT/SOCIAL WORK - NSDCVIVACCINE_GEN_ALL_CORE_FT
influenza, injectable, quadrivalent, preservative free; 02-Oct-2019 12:29; Ellie Gonzalez (RN); [employer]; 3bs44 (Exp. Date: 20-Jun-2020); IntraMuscular; Deltoid Left.; 0.5 milliLiter(s); VIS (VIS Published: 15-Aug-2019, VIS Presented: 02-Oct-2019);

## 2025-04-21 LAB — PTH RELATED PROT SERPL-MCNC: <2 PMOL/L — SIGNIFICANT CHANGE UP

## 2025-04-24 DIAGNOSIS — Z93.1 GASTROSTOMY STATUS: ICD-10-CM

## 2025-04-24 DIAGNOSIS — E78.5 HYPERLIPIDEMIA, UNSPECIFIED: ICD-10-CM

## 2025-04-24 DIAGNOSIS — Z79.01 LONG TERM (CURRENT) USE OF ANTICOAGULANTS: ICD-10-CM

## 2025-04-24 DIAGNOSIS — I10 ESSENTIAL (PRIMARY) HYPERTENSION: ICD-10-CM

## 2025-04-24 DIAGNOSIS — R19.7 DIARRHEA, UNSPECIFIED: ICD-10-CM

## 2025-04-24 DIAGNOSIS — I48.20 CHRONIC ATRIAL FIBRILLATION, UNSPECIFIED: ICD-10-CM

## 2025-04-24 DIAGNOSIS — K21.9 GASTRO-ESOPHAGEAL REFLUX DISEASE WITHOUT ESOPHAGITIS: ICD-10-CM

## 2025-04-24 DIAGNOSIS — Z93.0 TRACHEOSTOMY STATUS: ICD-10-CM

## 2025-04-24 DIAGNOSIS — E11.9 TYPE 2 DIABETES MELLITUS WITHOUT COMPLICATIONS: ICD-10-CM

## 2025-04-24 DIAGNOSIS — Z16.12 EXTENDED SPECTRUM BETA LACTAMASE (ESBL) RESISTANCE: ICD-10-CM

## 2025-04-24 DIAGNOSIS — J45.909 UNSPECIFIED ASTHMA, UNCOMPLICATED: ICD-10-CM

## 2025-04-24 DIAGNOSIS — E61.2 MAGNESIUM DEFICIENCY: ICD-10-CM

## 2025-04-24 DIAGNOSIS — E87.6 HYPOKALEMIA: ICD-10-CM

## 2025-04-24 DIAGNOSIS — E43 UNSPECIFIED SEVERE PROTEIN-CALORIE MALNUTRITION: ICD-10-CM

## 2025-04-24 DIAGNOSIS — Z98.890 OTHER SPECIFIED POSTPROCEDURAL STATES: ICD-10-CM

## 2025-04-24 DIAGNOSIS — E66.9 OBESITY, UNSPECIFIED: ICD-10-CM

## 2025-04-24 DIAGNOSIS — Q60.0 RENAL AGENESIS, UNILATERAL: ICD-10-CM

## 2025-04-24 DIAGNOSIS — E21.0 PRIMARY HYPERPARATHYROIDISM: ICD-10-CM

## 2025-04-24 DIAGNOSIS — I35.0 NONRHEUMATIC AORTIC (VALVE) STENOSIS: ICD-10-CM

## 2025-05-13 ENCOUNTER — INPATIENT (INPATIENT)
Facility: HOSPITAL | Age: 75
LOS: 20 days | Discharge: NURSING, REHAB OR LONG-TERM CARE | DRG: 710 | End: 2025-06-03
Attending: STUDENT IN AN ORGANIZED HEALTH CARE EDUCATION/TRAINING PROGRAM | Admitting: STUDENT IN AN ORGANIZED HEALTH CARE EDUCATION/TRAINING PROGRAM
Payer: MEDICAID

## 2025-05-13 VITALS
SYSTOLIC BLOOD PRESSURE: 79 MMHG | TEMPERATURE: 98 F | HEART RATE: 91 BPM | DIASTOLIC BLOOD PRESSURE: 45 MMHG | HEIGHT: 65.98 IN | RESPIRATION RATE: 24 BRPM | OXYGEN SATURATION: 86 %

## 2025-05-13 DIAGNOSIS — Z98.890 OTHER SPECIFIED POSTPROCEDURAL STATES: Chronic | ICD-10-CM

## 2025-05-13 DIAGNOSIS — Z93.4 OTHER ARTIFICIAL OPENINGS OF GASTROINTESTINAL TRACT STATUS: Chronic | ICD-10-CM

## 2025-05-13 DIAGNOSIS — N17.9 ACUTE KIDNEY FAILURE, UNSPECIFIED: ICD-10-CM

## 2025-05-13 DIAGNOSIS — Z93.1 GASTROSTOMY STATUS: Chronic | ICD-10-CM

## 2025-05-13 DIAGNOSIS — Z90.49 ACQUIRED ABSENCE OF OTHER SPECIFIED PARTS OF DIGESTIVE TRACT: Chronic | ICD-10-CM

## 2025-05-13 LAB
ALBUMIN SERPL ELPH-MCNC: 3.1 G/DL — LOW (ref 3.5–5.2)
ALP SERPL-CCNC: 120 U/L — HIGH (ref 30–115)
ALT FLD-CCNC: 10 U/L — SIGNIFICANT CHANGE UP (ref 0–41)
ANION GAP SERPL CALC-SCNC: 18 MMOL/L — HIGH (ref 7–14)
APPEARANCE UR: CLEAR — SIGNIFICANT CHANGE UP
APTT BLD: 31.3 SEC — SIGNIFICANT CHANGE UP (ref 27–39.2)
AST SERPL-CCNC: 15 U/L — SIGNIFICANT CHANGE UP (ref 0–41)
BASOPHILS # BLD AUTO: 0.05 K/UL — SIGNIFICANT CHANGE UP (ref 0–0.2)
BASOPHILS # BLD AUTO: 0.05 K/UL — SIGNIFICANT CHANGE UP (ref 0–0.2)
BASOPHILS NFR BLD AUTO: 0.3 % — SIGNIFICANT CHANGE UP (ref 0–1)
BASOPHILS NFR BLD AUTO: 0.3 % — SIGNIFICANT CHANGE UP (ref 0–1)
BILIRUB SERPL-MCNC: 0.7 MG/DL — SIGNIFICANT CHANGE UP (ref 0.2–1.2)
BILIRUB UR-MCNC: NEGATIVE — SIGNIFICANT CHANGE UP
BLD GP AB SCN SERPL QL: SIGNIFICANT CHANGE UP
BUN SERPL-MCNC: 72 MG/DL — CRITICAL HIGH (ref 10–20)
CALCIUM SERPL-MCNC: 10 MG/DL — SIGNIFICANT CHANGE UP (ref 8.4–10.5)
CHLORIDE SERPL-SCNC: 91 MMOL/L — LOW (ref 98–110)
CO2 SERPL-SCNC: 25 MMOL/L — SIGNIFICANT CHANGE UP (ref 17–32)
COLOR SPEC: YELLOW — SIGNIFICANT CHANGE UP
CREAT SERPL-MCNC: 3.9 MG/DL — HIGH (ref 0.7–1.5)
DIFF PNL FLD: ABNORMAL
EGFR: 12 ML/MIN/1.73M2 — LOW
EGFR: 12 ML/MIN/1.73M2 — LOW
EOSINOPHIL # BLD AUTO: 0.18 K/UL — SIGNIFICANT CHANGE UP (ref 0–0.7)
EOSINOPHIL # BLD AUTO: 0.24 K/UL — SIGNIFICANT CHANGE UP (ref 0–0.7)
EOSINOPHIL NFR BLD AUTO: 1 % — SIGNIFICANT CHANGE UP (ref 0–8)
EOSINOPHIL NFR BLD AUTO: 1.5 % — SIGNIFICANT CHANGE UP (ref 0–8)
FLUAV AG NPH QL: SIGNIFICANT CHANGE UP
FLUBV AG NPH QL: SIGNIFICANT CHANGE UP
GAS PNL BLDV: SIGNIFICANT CHANGE UP
GLUCOSE SERPL-MCNC: 83 MG/DL — SIGNIFICANT CHANGE UP (ref 70–99)
GLUCOSE UR QL: NEGATIVE MG/DL — SIGNIFICANT CHANGE UP
HCT VFR BLD CALC: 33.8 % — LOW (ref 37–47)
HCT VFR BLD CALC: 35.4 % — LOW (ref 37–47)
HGB BLD-MCNC: 11.1 G/DL — LOW (ref 12–16)
HGB BLD-MCNC: 11.5 G/DL — LOW (ref 12–16)
IMM GRANULOCYTES NFR BLD AUTO: 0.6 % — HIGH (ref 0.1–0.3)
IMM GRANULOCYTES NFR BLD AUTO: 0.6 % — HIGH (ref 0.1–0.3)
INR BLD: 1.01 RATIO — SIGNIFICANT CHANGE UP (ref 0.65–1.3)
KETONES UR QL: NEGATIVE MG/DL — SIGNIFICANT CHANGE UP
LACTATE SERPL-SCNC: 1.1 MMOL/L — SIGNIFICANT CHANGE UP (ref 0.7–2)
LACTATE SERPL-SCNC: 1.2 MMOL/L — SIGNIFICANT CHANGE UP (ref 0.7–2)
LEUKOCYTE ESTERASE UR-ACNC: ABNORMAL
LYMPHOCYTES # BLD AUTO: 1.71 K/UL — SIGNIFICANT CHANGE UP (ref 1.2–3.4)
LYMPHOCYTES # BLD AUTO: 1.76 K/UL — SIGNIFICANT CHANGE UP (ref 1.2–3.4)
LYMPHOCYTES # BLD AUTO: 11 % — LOW (ref 20.5–51.1)
LYMPHOCYTES # BLD AUTO: 9.4 % — LOW (ref 20.5–51.1)
MCHC RBC-ENTMCNC: 27.6 PG — SIGNIFICANT CHANGE UP (ref 27–31)
MCHC RBC-ENTMCNC: 27.8 PG — SIGNIFICANT CHANGE UP (ref 27–31)
MCHC RBC-ENTMCNC: 32.5 G/DL — SIGNIFICANT CHANGE UP (ref 32–37)
MCHC RBC-ENTMCNC: 32.8 G/DL — SIGNIFICANT CHANGE UP (ref 32–37)
MCV RBC AUTO: 84.5 FL — SIGNIFICANT CHANGE UP (ref 81–99)
MCV RBC AUTO: 85.1 FL — SIGNIFICANT CHANGE UP (ref 81–99)
MONOCYTES # BLD AUTO: 0.96 K/UL — HIGH (ref 0.1–0.6)
MONOCYTES # BLD AUTO: 1.04 K/UL — HIGH (ref 0.1–0.6)
MONOCYTES NFR BLD AUTO: 5.5 % — SIGNIFICANT CHANGE UP (ref 1.7–9.3)
MONOCYTES NFR BLD AUTO: 6.2 % — SIGNIFICANT CHANGE UP (ref 1.7–9.3)
NEUTROPHILS # BLD AUTO: 12.48 K/UL — HIGH (ref 1.4–6.5)
NEUTROPHILS # BLD AUTO: 15.59 K/UL — HIGH (ref 1.4–6.5)
NEUTROPHILS NFR BLD AUTO: 80.4 % — HIGH (ref 42.2–75.2)
NEUTROPHILS NFR BLD AUTO: 83.2 % — HIGH (ref 42.2–75.2)
NITRITE UR-MCNC: NEGATIVE — SIGNIFICANT CHANGE UP
NRBC BLD AUTO-RTO: 0 /100 WBCS — SIGNIFICANT CHANGE UP (ref 0–0)
NRBC BLD AUTO-RTO: 0 /100 WBCS — SIGNIFICANT CHANGE UP (ref 0–0)
PH UR: 8 — SIGNIFICANT CHANGE UP (ref 5–8)
PLATELET # BLD AUTO: 180 K/UL — SIGNIFICANT CHANGE UP (ref 130–400)
PLATELET # BLD AUTO: 332 K/UL — SIGNIFICANT CHANGE UP (ref 130–400)
PMV BLD: 11.1 FL — HIGH (ref 7.4–10.4)
PMV BLD: 11.1 FL — HIGH (ref 7.4–10.4)
POTASSIUM SERPL-MCNC: 4.8 MMOL/L — SIGNIFICANT CHANGE UP (ref 3.5–5)
POTASSIUM SERPL-SCNC: 4.8 MMOL/L — SIGNIFICANT CHANGE UP (ref 3.5–5)
PROT SERPL-MCNC: 5.8 G/DL — LOW (ref 6–8)
PROT UR-MCNC: 30 MG/DL
PROTHROM AB SERPL-ACNC: 11.9 SEC — SIGNIFICANT CHANGE UP (ref 9.95–12.87)
RBC # BLD: 4 M/UL — LOW (ref 4.2–5.4)
RBC # BLD: 4.16 M/UL — LOW (ref 4.2–5.4)
RBC # FLD: 14.5 % — SIGNIFICANT CHANGE UP (ref 11.5–14.5)
RBC # FLD: 14.6 % — HIGH (ref 11.5–14.5)
RSV RNA NPH QL NAA+NON-PROBE: SIGNIFICANT CHANGE UP
SARS-COV-2 RNA SPEC QL NAA+PROBE: DETECTED
SODIUM SERPL-SCNC: 134 MMOL/L — LOW (ref 135–146)
SOURCE RESPIRATORY: SIGNIFICANT CHANGE UP
SP GR SPEC: 1.01 — SIGNIFICANT CHANGE UP (ref 1–1.03)
TROPONIN T, HIGH SENSITIVITY RESULT: 143 NG/L — CRITICAL HIGH (ref 6–13)
TROPONIN T, HIGH SENSITIVITY RESULT: 84 NG/L — CRITICAL HIGH (ref 6–13)
UROBILINOGEN FLD QL: 0.2 MG/DL — SIGNIFICANT CHANGE UP (ref 0.2–1)
WBC # BLD: 15.53 K/UL — HIGH (ref 4.8–10.8)
WBC # BLD: 18.74 K/UL — HIGH (ref 4.8–10.8)
WBC # FLD AUTO: 15.53 K/UL — HIGH (ref 4.8–10.8)
WBC # FLD AUTO: 18.74 K/UL — HIGH (ref 4.8–10.8)

## 2025-05-13 PROCEDURE — 85025 COMPLETE CBC W/AUTO DIFF WBC: CPT

## 2025-05-13 PROCEDURE — P9047: CPT | Mod: JZ

## 2025-05-13 PROCEDURE — 71260 CT THORAX DX C+: CPT

## 2025-05-13 PROCEDURE — 83550 IRON BINDING TEST: CPT

## 2025-05-13 PROCEDURE — 83540 ASSAY OF IRON: CPT

## 2025-05-13 PROCEDURE — 92610 EVALUATE SWALLOWING FUNCTION: CPT | Mod: GN

## 2025-05-13 PROCEDURE — 74177 CT ABD & PELVIS W/CONTRAST: CPT | Mod: 26

## 2025-05-13 PROCEDURE — 84466 ASSAY OF TRANSFERRIN: CPT

## 2025-05-13 PROCEDURE — 82962 GLUCOSE BLOOD TEST: CPT

## 2025-05-13 PROCEDURE — P9045: CPT | Mod: JZ

## 2025-05-13 PROCEDURE — 93306 TTE W/DOPPLER COMPLETE: CPT

## 2025-05-13 PROCEDURE — 74018 RADEX ABDOMEN 1 VIEW: CPT

## 2025-05-13 PROCEDURE — 86901 BLOOD TYPING SEROLOGIC RH(D): CPT

## 2025-05-13 PROCEDURE — 82803 BLOOD GASES ANY COMBINATION: CPT

## 2025-05-13 PROCEDURE — 84132 ASSAY OF SERUM POTASSIUM: CPT

## 2025-05-13 PROCEDURE — 94640 AIRWAY INHALATION TREATMENT: CPT

## 2025-05-13 PROCEDURE — 93010 ELECTROCARDIOGRAM REPORT: CPT | Mod: 76

## 2025-05-13 PROCEDURE — 82330 ASSAY OF CALCIUM: CPT

## 2025-05-13 PROCEDURE — 85014 HEMATOCRIT: CPT

## 2025-05-13 PROCEDURE — 85027 COMPLETE CBC AUTOMATED: CPT

## 2025-05-13 PROCEDURE — 87640 STAPH A DNA AMP PROBE: CPT

## 2025-05-13 PROCEDURE — 85730 THROMBOPLASTIN TIME PARTIAL: CPT

## 2025-05-13 PROCEDURE — 82570 ASSAY OF URINE CREATININE: CPT

## 2025-05-13 PROCEDURE — 76937 US GUIDE VASCULAR ACCESS: CPT

## 2025-05-13 PROCEDURE — 82247 BILIRUBIN TOTAL: CPT

## 2025-05-13 PROCEDURE — 93005 ELECTROCARDIOGRAM TRACING: CPT

## 2025-05-13 PROCEDURE — 71045 X-RAY EXAM CHEST 1 VIEW: CPT | Mod: 26

## 2025-05-13 PROCEDURE — 94002 VENT MGMT INPAT INIT DAY: CPT

## 2025-05-13 PROCEDURE — 71045 X-RAY EXAM CHEST 1 VIEW: CPT

## 2025-05-13 PROCEDURE — 85652 RBC SED RATE AUTOMATED: CPT

## 2025-05-13 PROCEDURE — 86850 RBC ANTIBODY SCREEN: CPT

## 2025-05-13 PROCEDURE — 99285 EMERGENCY DEPT VISIT HI MDM: CPT | Mod: 57

## 2025-05-13 PROCEDURE — 36573 INSJ PICC RS&I 5 YR+: CPT | Mod: LT

## 2025-05-13 PROCEDURE — 99291 CRITICAL CARE FIRST HOUR: CPT

## 2025-05-13 PROCEDURE — 71250 CT THORAX DX C-: CPT

## 2025-05-13 PROCEDURE — 88307 TISSUE EXAM BY PATHOLOGIST: CPT

## 2025-05-13 PROCEDURE — 87641 MR-STAPH DNA AMP PROBE: CPT

## 2025-05-13 PROCEDURE — 80048 BASIC METABOLIC PNL TOTAL CA: CPT

## 2025-05-13 PROCEDURE — 84295 ASSAY OF SERUM SODIUM: CPT

## 2025-05-13 PROCEDURE — 85018 HEMOGLOBIN: CPT

## 2025-05-13 PROCEDURE — 88305 TISSUE EXAM BY PATHOLOGIST: CPT

## 2025-05-13 PROCEDURE — 82728 ASSAY OF FERRITIN: CPT

## 2025-05-13 PROCEDURE — 74177 CT ABD & PELVIS W/CONTRAST: CPT

## 2025-05-13 PROCEDURE — 84100 ASSAY OF PHOSPHORUS: CPT

## 2025-05-13 PROCEDURE — 36415 COLL VENOUS BLD VENIPUNCTURE: CPT

## 2025-05-13 PROCEDURE — 86900 BLOOD TYPING SEROLOGIC ABO: CPT

## 2025-05-13 PROCEDURE — C1751: CPT

## 2025-05-13 PROCEDURE — 80076 HEPATIC FUNCTION PANEL: CPT

## 2025-05-13 PROCEDURE — 80053 COMPREHEN METABOLIC PANEL: CPT

## 2025-05-13 PROCEDURE — 83735 ASSAY OF MAGNESIUM: CPT

## 2025-05-13 PROCEDURE — 84478 ASSAY OF TRIGLYCERIDES: CPT

## 2025-05-13 PROCEDURE — 83605 ASSAY OF LACTIC ACID: CPT

## 2025-05-13 PROCEDURE — L8699: CPT

## 2025-05-13 PROCEDURE — C1889: CPT

## 2025-05-13 PROCEDURE — C9399: CPT

## 2025-05-13 PROCEDURE — 88300 SURGICAL PATH GROSS: CPT

## 2025-05-13 PROCEDURE — 86140 C-REACTIVE PROTEIN: CPT

## 2025-05-13 PROCEDURE — 36430 TRANSFUSION BLD/BLD COMPNT: CPT

## 2025-05-13 PROCEDURE — 94760 N-INVAS EAR/PLS OXIMETRY 1: CPT

## 2025-05-13 PROCEDURE — 94003 VENT MGMT INPAT SUBQ DAY: CPT

## 2025-05-13 PROCEDURE — P9016: CPT

## 2025-05-13 PROCEDURE — C1769: CPT

## 2025-05-13 PROCEDURE — 86923 COMPATIBILITY TEST ELECTRIC: CPT

## 2025-05-13 PROCEDURE — 85610 PROTHROMBIN TIME: CPT

## 2025-05-13 PROCEDURE — 93970 EXTREMITY STUDY: CPT

## 2025-05-13 RX ORDER — CASPOFUNGIN ACETATE 5 MG/ML
INJECTION, POWDER, LYOPHILIZED, FOR SOLUTION INTRAVENOUS
Refills: 0 | Status: DISCONTINUED | OUTPATIENT
Start: 2025-05-13 | End: 2025-05-20

## 2025-05-13 RX ORDER — CINACALCET 30 MG/1
1 TABLET, FILM COATED ORAL
Refills: 0 | DISCHARGE

## 2025-05-13 RX ORDER — IPRATROPIUM BROMIDE AND ALBUTEROL SULFATE .5; 2.5 MG/3ML; MG/3ML
3 SOLUTION RESPIRATORY (INHALATION) EVERY 6 HOURS
Refills: 0 | Status: DISCONTINUED | OUTPATIENT
Start: 2025-05-13 | End: 2025-05-14

## 2025-05-13 RX ORDER — PIPERACILLIN-TAZO-DEXTROSE,ISO 3.375G/5
3.38 IV SOLUTION, PIGGYBACK PREMIX FROZEN(ML) INTRAVENOUS ONCE
Refills: 0 | Status: DISCONTINUED | OUTPATIENT
Start: 2025-05-13 | End: 2025-05-13

## 2025-05-13 RX ORDER — PROTHROMBIN COMPLEX CONCENTRATE (HUMAN) 25.5; 16.5; 24; 22; 22; 26 [IU]/ML; [IU]/ML; [IU]/ML; [IU]/ML; [IU]/ML; [IU]/ML
2000 POWDER, FOR SOLUTION INTRAVENOUS ONCE
Refills: 0 | Status: COMPLETED | OUTPATIENT
Start: 2025-05-13 | End: 2025-05-13

## 2025-05-13 RX ORDER — HEPARIN SODIUM 1000 [USP'U]/ML
5000 INJECTION INTRAVENOUS; SUBCUTANEOUS EVERY 8 HOURS
Refills: 0 | Status: DISCONTINUED | OUTPATIENT
Start: 2025-05-13 | End: 2025-05-19

## 2025-05-13 RX ORDER — PIPERACILLIN-TAZO-DEXTROSE,ISO 3.375G/5
3.38 IV SOLUTION, PIGGYBACK PREMIX FROZEN(ML) INTRAVENOUS ONCE
Refills: 0 | Status: COMPLETED | OUTPATIENT
Start: 2025-05-14 | End: 2025-05-14

## 2025-05-13 RX ORDER — SODIUM CHLORIDE 9 G/1000ML
1850 INJECTION, SOLUTION INTRAVENOUS ONCE
Refills: 0 | Status: COMPLETED | OUTPATIENT
Start: 2025-05-13 | End: 2025-05-13

## 2025-05-13 RX ORDER — ACETAMINOPHEN 500 MG/5ML
1000 LIQUID (ML) ORAL ONCE
Refills: 0 | Status: DISCONTINUED | OUTPATIENT
Start: 2025-05-13 | End: 2025-05-17

## 2025-05-13 RX ORDER — NOREPINEPHRINE BITARTRATE 8 MG
0.05 SOLUTION INTRAVENOUS
Qty: 8 | Refills: 0 | Status: DISCONTINUED | OUTPATIENT
Start: 2025-05-13 | End: 2025-05-14

## 2025-05-13 RX ORDER — CEFEPIME 2 G/20ML
2000 INJECTION, POWDER, FOR SOLUTION INTRAVENOUS ONCE
Refills: 0 | Status: COMPLETED | OUTPATIENT
Start: 2025-05-13 | End: 2025-05-13

## 2025-05-13 RX ORDER — CASPOFUNGIN ACETATE 5 MG/ML
50 INJECTION, POWDER, LYOPHILIZED, FOR SOLUTION INTRAVENOUS EVERY 24 HOURS
Refills: 0 | Status: DISCONTINUED | OUTPATIENT
Start: 2025-05-14 | End: 2025-05-20

## 2025-05-13 RX ORDER — VANCOMYCIN HCL IN 5 % DEXTROSE 1.5G/250ML
1000 PLASTIC BAG, INJECTION (ML) INTRAVENOUS ONCE
Refills: 0 | Status: COMPLETED | OUTPATIENT
Start: 2025-05-13 | End: 2025-05-13

## 2025-05-13 RX ORDER — PIPERACILLIN-TAZO-DEXTROSE,ISO 3.375G/5
3.38 IV SOLUTION, PIGGYBACK PREMIX FROZEN(ML) INTRAVENOUS EVERY 12 HOURS
Refills: 0 | Status: DISCONTINUED | OUTPATIENT
Start: 2025-05-14 | End: 2025-05-14

## 2025-05-13 RX ORDER — ALBUTEROL SULFATE 2.5 MG/3ML
3 VIAL, NEBULIZER (ML) INHALATION
Refills: 0 | DISCHARGE

## 2025-05-13 RX ORDER — CASPOFUNGIN ACETATE 5 MG/ML
70 INJECTION, POWDER, LYOPHILIZED, FOR SOLUTION INTRAVENOUS ONCE
Refills: 0 | Status: COMPLETED | OUTPATIENT
Start: 2025-05-13 | End: 2025-05-13

## 2025-05-13 RX ORDER — VANCOMYCIN HCL IN 5 % DEXTROSE 1.5G/250ML
1250 PLASTIC BAG, INJECTION (ML) INTRAVENOUS EVERY 24 HOURS
Refills: 0 | Status: DISCONTINUED | OUTPATIENT
Start: 2025-05-14 | End: 2025-05-14

## 2025-05-13 RX ADMIN — Medication 80 MILLIGRAM(S): at 20:07

## 2025-05-13 RX ADMIN — NOREPINEPHRINE BITARTRATE 8.15 MICROGRAM(S)/KG/MIN: 8 SOLUTION at 22:30

## 2025-05-13 RX ADMIN — Medication 250 MILLIGRAM(S): at 16:58

## 2025-05-13 RX ADMIN — SODIUM CHLORIDE 1850 MILLILITER(S): 9 INJECTION, SOLUTION INTRAVENOUS at 17:00

## 2025-05-13 RX ADMIN — CEFEPIME 100 MILLIGRAM(S): 2 INJECTION, POWDER, FOR SOLUTION INTRAVENOUS at 16:30

## 2025-05-13 RX ADMIN — Medication 1000 MILLILITER(S): at 22:33

## 2025-05-13 RX ADMIN — SODIUM CHLORIDE 1850 MILLILITER(S): 9 INJECTION, SOLUTION INTRAVENOUS at 16:07

## 2025-05-13 RX ADMIN — Medication 10 MG/HR: at 20:07

## 2025-05-13 RX ADMIN — PROTHROMBIN COMPLEX CONCENTRATE (HUMAN) 400 INTERNATIONAL UNIT(S): 25.5; 16.5; 24; 22; 22; 26 POWDER, FOR SOLUTION INTRAVENOUS at 22:20

## 2025-05-13 NOTE — ED PROVIDER NOTE - OBJECTIVE STATEMENT
74-year-old female with history of A-fib on Xarelto, type 2 diabetes, hypertension, hyperlipidemia, congenital solitary kidney, chronic trach collar presents to ED from nursing home for evaluation.  Per EMS, patient has been complaining of pain to her abdomen.  No fevers, nausea, or vomiting.  Patient is minimally verbal at baseline, but follows commands appropriately.  She is nodding her head to complaints of abdominal pain pointing to her abdomen.  Further history is limited secondary to patient being minimally verbal. 74-year-old female with history of A-fib on Xarelto, type 2 diabetes, hypertension, hyperlipidemia, congenital solitary kidney, chronic trach collar presents to ED from nursing home for evaluation.  Per EMS, patient has been complaining of pain to her abdomen.  Noted to be hypoxic to the mid 80s on 8L on the trach collar. No fevers, nausea, or vomiting.  Patient is minimally verbal at baseline, but follows commands appropriately.  She is nodding her head to complaints of abdominal pain pointing to her abdomen.  Further history is limited secondary to patient being minimally verbal.

## 2025-05-13 NOTE — CONSULT NOTE ADULT - ASSESSMENT
74F found to have duodenal perforation.    NEUROLOGICAL:  #Acute pain    -IV APAP prn    -dilaudid 0.25 prn moderate pain    -dilaudid 0.5 prn severe pain    RESPIRATORY:   #Oxygenation, s/p tracheostomy (11/24)    - tolerating trach collar    - continue duonebs / continue tracheostomy care     - f/u CXR  #Decreased small bibasilar opacities/atelectasis, right greater than left on CT  #Activity    -increase as tolerated    CARDS:   #hx afib  - holding home Xarelto 15 QD (PCC ordered prior to OR)  #hx HTN  - holding home losartan 100QD  #elevated troponin  - 143 > 84   - no longer trending  Imaging:   EKG pending       GASTROINTESTINAL/NUTRITION:   #Diet, Strict NPO    -aspiration precautions, HOB 30    - hx of gastrostomy and feeding jejunostomy tube (11/24)  #perforated duodenal ulcer   - strict NPO  - pending OR for ex lap with Dr. Pena  #GI Prophylaxis    - pantoprazole Infusion given duodenal perforation  #Bowel regimen    -holding    -last bowel movement prior to admission    /RENAL:   #urine output in critically ill    -chronic indwelling ya  #NOLA  - baseline creatine 0.6  - f/u urine lytes    - NS @100cc/hr  #hx congential solitary kidney     Labs          BUN/Cr: 72/3.9 -->           [05-13 @ 15:45]Na  134 // K  4.8 // Mg  -- // Phos  --         HEME/ONC:   #DVT prophylaxis     -Chemical: SQH      -Mechanical: SCDs    -holding home Xarelto pending OR > s/p PCC prior to surgery        Labs: Hb/Hct:  11.5/35.4  (05-13 @ 15:45)  -->,  11.1/33.8  (05-13 @ 19:55)  -->                      Plts:  332  -->,  180  -->                 PTT/INR:  31.3/1.01  --->       ID:  #leukocytosis  - zosyn/vanc/caspo per primary team   WBC- 18.74  --->>,  15.53  --->>  Temp trend- 24hrs T(F): 98.3 (05-13 @ 15:21), Max: 98.3 (05-13 @ 15:21)  #MRSA nares pending     ENDOCRINE:  #glycemic monitoring    -FSG q6 if NPO    -Glucose goal 140-180. If above 180, will start corrective insulin sliding scale  #hypercalcemia  - holding home cinacalcet 30mg bid while NPO    MSK:  #Activity - Ambulate as Tolerated:     Time/Priority:  Routine (05-13-25 @ 21:31)  #hx osteoporosis  - holding home Alendronate 70mg weekly on Thursdays while NPO    SKIN:  #DTI screening PENDING     - will continue to monitor daily skin changes      LINES/DRAINS:  Jennyfer OSPINA (chronic)  ADVANCED DIRECTIVES:  Full Code    HCP/Emergency Contact- daughter Cecilia [phone]  INDICATION FOR SICU/SDU:  perforated duodenal ulcer  DISPO:   SICU. Case discussed with attending Dr. Pena 74F found to have duodenal perforation.    NEUROLOGICAL:  #Acute pain    -IV APAP prn    -dilaudid 0.25 prn moderate pain    -dilaudid 0.5 prn severe pain    RESPIRATORY:   #Oxygenation, s/p tracheostomy (11/24)    - tolerating trach collar    - continue duonebs / continue tracheostomy care     - f/u CXR  #Decreased small bibasilar opacities/atelectasis, right greater than left on CT  #Activity    -increase as tolerated    CARDS:   #hx afib  - holding home Xarelto 15 QD (PCC ordered prior to OR)  #maintain normotension  - MAP >65  #hx HTN  - holding home losartan 100QD  #elevated troponin  - 143 > 84   - no longer trending  Imaging:   EKG pending       GASTROINTESTINAL/NUTRITION:   #Diet, Strict NPO    -aspiration precautions, HOB 30    - hx of gastrostomy and feeding jejunostomy tube (11/24)  #perforated duodenal ulcer   - strict NPO  - pending OR for ex lap with Dr. Pena  #GI Prophylaxis    - pantoprazole Infusion given duodenal perforation  #Bowel regimen    -holding    -last bowel movement prior to admission    /RENAL:   #urine output in critically ill    -chronic indwelling ya  #NOLA  - baseline creatine 0.6  - f/u urine lytes    - NS @100cc/hr  #hx congential solitary kidney     Labs          BUN/Cr: 72/3.9 -->           [05-13 @ 15:45]Na  134 // K  4.8 // Mg  -- // Phos  --         HEME/ONC:   #DVT prophylaxis     -Chemical: SQH      -Mechanical: SCDs    -holding home Xarelto pending OR > s/p PCC prior to surgery        Labs: Hb/Hct:  11.5/35.4  (05-13 @ 15:45)  -->,  11.1/33.8  (05-13 @ 19:55)  -->                      Plts:  332  -->,  180  -->                 PTT/INR:  31.3/1.01  --->       ID:  #leukocytosis  - zosyn/vanc/caspo per primary team   WBC- 18.74  --->>,  15.53  --->>  Temp trend- 24hrs T(F): 98.3 (05-13 @ 15:21), Max: 98.3 (05-13 @ 15:21)  #MRSA nares pending     ENDOCRINE:  #glycemic monitoring    -FSG q6 if NPO    -Glucose goal 140-180. If above 180, will start corrective insulin sliding scale  #hypercalcemia  - holding home cinacalcet 30mg bid while NPO    MSK:  #Activity - Ambulate as Tolerated:     Time/Priority:  Routine (05-13-25 @ 21:31)  #hx osteoporosis  - holding home Alendronate 70mg weekly on Thursdays while NPO    SKIN:  #DTI screening PENDING     - will continue to monitor daily skin changes      LINES/DRAINS:  Jennyfer OSPINA (chronic)  ADVANCED DIRECTIVES:  Full Code    HCP/Emergency Contact- daughter Cecilia [phone]  INDICATION FOR SICU/SDU:  perforated duodenal ulcer  DISPO:   SICU. Case discussed with attending Dr. Pena 74F found to have duodenal perforation.    NEUROLOGICAL:  #Acute pain    -IV APAP prn    -dilaudid 0.25 prn moderate pain    -dilaudid 0.5 prn severe pain    RESPIRATORY:   #Oxygenation, s/p tracheostomy (11/24)    - tolerating trach collar    - continue duonebs / continue tracheostomy care     - f/u CXR  #Decreased small bibasilar opacities/atelectasis, right greater than left on CT  #Activity    -increase as tolerated    CARDS:   #hx afib  - holding home Xarelto 15 QD (PCC ordered prior to OR)  #maintain normotension  - MAP >65  #hx HTN  - holding home losartan 100QD  #elevated troponin  - 143 > 84   - no longer trending  Imaging:   EKG pending       GASTROINTESTINAL/NUTRITION:   #Diet, Strict NPO    -aspiration precautions, HOB 30    - hx of gastrostomy and feeding jejunostomy tube (11/24) > keep G to gravity   #perforated duodenal ulcer   - strict NPO  - pending OR for ex lap with Dr. Pena  #GI Prophylaxis    - pantoprazole Infusion given duodenal perforation  #Bowel regimen    -holding    -last bowel movement prior to admission    /RENAL:   #urine output in critically ill    -chronic indwelling ya  #NOLA  - baseline creatine 0.6  - f/u urine lytes    - NS @100cc/hr  #hx congential solitary kidney     Labs          BUN/Cr: 72/3.9 -->           [05-13 @ 15:45]Na  134 // K  4.8 // Mg  -- // Phos  --         HEME/ONC:   #DVT prophylaxis     -Chemical: SQH      -Mechanical: SCDs    -holding home Xarelto pending OR > s/p PCC prior to surgery        Labs: Hb/Hct:  11.5/35.4  (05-13 @ 15:45)  -->,  11.1/33.8  (05-13 @ 19:55)  -->                      Plts:  332  -->,  180  -->                 PTT/INR:  31.3/1.01  --->       ID:  #leukocytosis  - zosyn/vanc/caspo per primary team   WBC- 18.74  --->>,  15.53  --->>  Temp trend- 24hrs T(F): 98.3 (05-13 @ 15:21), Max: 98.3 (05-13 @ 15:21)  #MRSA nares pending     ENDOCRINE:  #glycemic monitoring    -FSG q6 if NPO    -Glucose goal 140-180. If above 180, will start corrective insulin sliding scale  #hypercalcemia  - holding home cinacalcet 30mg bid while NPO    MSK:  #Activity - Ambulate as Tolerated:     Time/Priority:  Routine (05-13-25 @ 21:31)  #hx osteoporosis  - holding home Alendronate 70mg weekly on Thursdays while NPO    SKIN:  #DTI screening PENDING     - will continue to monitor daily skin changes      LINES/DRAINS:  Jennyfer OSPINA (chronic)  ADVANCED DIRECTIVES:  Full Code    HCP/Emergency Contact- daughter Cecilia [phone]  INDICATION FOR SICU/SDU:  perforated duodenal ulcer  DISPO:   SICU. Case discussed with attending Dr. Pena 74F found to have duodenal perforation.    NEUROLOGICAL:  #Acute pain    -IV APAP prn    -dilaudid 0.25 prn moderate pain    -dilaudid 0.5 prn severe pain    RESPIRATORY:   #Oxygenation, s/p tracheostomy (11/24)    - tolerating trach collar    - continue duonebs / continue tracheostomy care     - f/u CXR  #Decreased small bibasilar opacities/atelectasis, right greater than left on CT  #Activity    -increase as tolerated    CARDS:   #hx afib  - holding home Xarelto 15 QD (PCC ordered prior to OR)  #maintain normotension  - MAP >65  #hx HTN  - holding home losartan 100QD  #elevated troponin  - 143 > 84   - no longer trending  Imaging:   EKG pending       GASTROINTESTINAL/NUTRITION:   #Diet, Strict NPO    -aspiration precautions, HOB 30    - hx of gastrostomy and feeding jejunostomy tube (11/24) > keep G to gravity   #perforated duodenal ulcer   - strict NPO, G tube to gravity   - pending OR for ex lap with Dr. Pena  #GI Prophylaxis    - pantoprazole Infusion given duodenal perforation  #Bowel regimen    -holding    -last bowel movement prior to admission    /RENAL:   #urine output in critically ill    -chronic indwelling ya  #NOLA  - baseline creatine 0.6  - f/u urine lytes    - NS @100cc/hr  #hx congential solitary kidney     Labs          BUN/Cr: 72/3.9 -->           [05-13 @ 15:45]Na  134 // K  4.8 // Mg  -- // Phos  --         HEME/ONC:   #DVT prophylaxis     -Chemical: SQH      -Mechanical: SCDs    -holding home Xarelto pending OR > s/p PCC prior to surgery        Labs: Hb/Hct:  11.5/35.4  (05-13 @ 15:45)  -->,  11.1/33.8  (05-13 @ 19:55)  -->                      Plts:  332  -->,  180  -->                 PTT/INR:  31.3/1.01  --->       ID:  #leukocytosis  - zosyn/vanc/caspo per primary team   WBC- 18.74  --->>,  15.53  --->>  Temp trend- 24hrs T(F): 98.3 (05-13 @ 15:21), Max: 98.3 (05-13 @ 15:21)  #MRSA nares pending     ENDOCRINE:  #glycemic monitoring    -FSG q6 if NPO    -Glucose goal 140-180. If above 180, will start corrective insulin sliding scale  #hypercalcemia  - holding home cinacalcet 30mg bid while NPO    MSK:  #Activity - Ambulate as Tolerated:     Time/Priority:  Routine (05-13-25 @ 21:31)  #hx osteoporosis  - holding home Alendronate 70mg weekly on Thursdays while NPO    SKIN:  #DTI screening PENDING     - will continue to monitor daily skin changes      LINES/DRAINS:  PIV, Ya (chronic), GJ in place, tracheostomy   ADVANCED DIRECTIVES:  Full Code    HCP/Emergency Contact- daughter Cecilia [phone]  INDICATION FOR SICU/SDU:  perforated duodenal ulcer  DISPO:   SICU. Case discussed with attending Dr. Pena

## 2025-05-13 NOTE — H&P ADULT - ATTENDING COMMENTS
Discussed case with ED team.  Reviewed imaging and lab work independently.    Visited pt at bedside in ED. Patient non-verbal at baseline but able to communicate with hand gestures and head movements. Reports having abdominal pain. Endorses pain present for past 4 days. Denies any vomiting. Reports pain has progressively increased in intensity.    PE:  General; female, in bed, tired appearing, in distress  Head; NC/AT, trach in place  Heart: RRR  Lungs; even chest rise, no accessory muscle use  Abdomen: soft, TTP epigastric and RUQ regions, non-peritoneal, GJ tube in place, previous scar tissue appreciated right al-abdomen    A/P:  74 F with perforated ulcer, contained, on CT imaging with abdominal pain, leukocytosis, hypotension concerning for sepsis.   - Patient gave consent to discuss case with her daughter Jewell; discussion had with patient and her daughter. Discussed risks of proceeding to the OR for an exploratory laparotomy to evaluate area of concern, risks included bleeding, infection, damage to surrounding organs, possibility of dying during procedure, daughter and patient wish to proceed to the OR. Medical management was also discussed with patient and daughter, but there is the concern of patient deteriorating clinically further if managed medically only,  - To proceed to OR as a level 1  - NPO  - Reversal of a/c with PCC  - PRBCS of hold  - Has chronic Burger; monitor urine output  - Broad spectrum abx  - IVF hydration  - Home medication reconciliation

## 2025-05-13 NOTE — ED PROVIDER NOTE - PROGRESS NOTE DETAILS
BULMARO: After suctioning, hypoxia improved, now sating 100% on 8L on the trach collar. Authored by Jane Li DO: Perforated duodenal ulcer on CT imaging; GI and surgery consulted.

## 2025-05-13 NOTE — ED ADULT NURSE NOTE - NSFALLUNIVINTERV_ED_ALL_ED
Bed/Stretcher in lowest position, wheels locked, appropriate side rails in place/Call bell, personal items and telephone in reach/Instruct patient to call for assistance before getting out of bed/chair/stretcher/Non-slip footwear applied when patient is off stretcher/[place] to call system/Physically safe environment - no spills, clutter or unnecessary equipment/Purposeful proactive rounding/Room/bathroom lighting operational, light cord in reach

## 2025-05-13 NOTE — H&P ADULT - HISTORY OF PRESENT ILLNESS
Pt is 81F PMHx HTN, a-fib (on xarelto), solitary kidney, DM, GERD, prior open cholecystectomy c/b suture granulomas s/p 2019 abdominal wall debridements by Dr. Banda, respiratory distress with prolonged ventilation during 11/2024 admission, s/p 11/19 tracheostomy and gastrostomy and feeding jejunostomy tube by Dr. Champion, ya catheter dependence who presented to the ED from NH on 5/13 with 4 days progressively worsening abdominal pain. Surgery consulted for imaging concerning for duodenal perforation. History limited by pt's minimally-verbal status. Spoke with daughter on phone, pt is full code at this time,

## 2025-05-13 NOTE — ED PROVIDER NOTE - CARE PLAN
Principal Discharge DX:	Perforated duodenal ulcer  Secondary Diagnosis:	NOLA (acute kidney injury)   1

## 2025-05-13 NOTE — ED PROVIDER NOTE - CLINICAL SUMMARY MEDICAL DECISION MAKING FREE TEXT BOX
75 yo F presented to ED for evaluation of abdominal pain. Labs and EKG were ordered and reviewed.  No HILARIO. Imaging was ordered and reviewed by me.  CT showing perforated duodenal ulcer. GI and Surgery consulted. Appropriate medications for patient's presenting complaints were ordered and effects were reassessed.  Patient's records (prior hospital, ED visit, and/or nursing home notes if available) were reviewed.  Additional history was obtained from EMS, family, and/or PCP (where available).  Escalation to admission/observation was considered. Patient requires inpatient hospitalization - monitored setting.

## 2025-05-13 NOTE — ED PROVIDER NOTE - PHYSICAL EXAMINATION
VITAL SIGNS: I have reviewed nursing notes and confirm.  CONSTITUTIONAL: frail, in no acute distress.  SKIN: Skin exam is warm and dry  HEAD: Normocephalic; atraumatic.  EYES: PERRL, EOM intact  ENT: airway clear.   NECK: trach collar in place  CARD: S1, S2 normal; no murmurs, gallops, or rubs. Regular rate and rhythm.  RESP: coarse breath sounds b/l  ABD: Normal bowel sounds; soft; tenderness diffusely  EXT: Normal ROM.   NEURO: Alert, following commands

## 2025-05-13 NOTE — H&P ADULT - NSHPPHYSICALEXAM_GEN_ALL_CORE
GENERAL: NAD, following commands  HEENT: Normocephalic, atraumatic  PULM: Non-labored respirations, bilateral chest rise, saturating well on 8L trach collar  CV: BP 90s/50s, HR 90s  ABDOMEN: Abdomen moderately-distended, R-sided tenderness with rebound tenderness, abdominal wall hernia noted  : Burger in place  MSK: Moving extremities spontaneously  SKIN: Warm, dry, normal skin color, texture, turgor

## 2025-05-13 NOTE — H&P ADULT - NSICDXPASTSURGICALHX_GEN_ALL_CORE_FT
PAST SURGICAL HISTORY:  H/O hernia repair 2011 and revised in 2013    H/O tracheostomy     History of cholecystectomy     S/P gastrostomy     S/P jejunostomy     Status post debridement

## 2025-05-13 NOTE — ED PROVIDER NOTE - ATTENDING CONTRIBUTION TO CARE
74-year-old female past medical history A-fib on Xarelto recent ICU admission for septic shock on trach presents with [facility] hypoxic hypotensive concern for sepsis    Patient initially hypotensive however improved after fluid bolus patient now 90% on 40% FiO2 at 8 L sepsis workup IV fluids reassess
Yes...

## 2025-05-13 NOTE — CONSULT NOTE ADULT - SUBJECTIVE AND OBJECTIVE BOX
PATRICIA SPRAGUE   [id number]   [birth year]    74yF  ============================================================   DATE OF INITIAL SICU/SDU CONSULT: 05-13-25    INDICATION FOR SICU CONSULT:  perforated duodenal ulcer      SICU COURSE EVENTS :  05-13 - admitted to SICU service     24HOUR EVENTS  -Admission under SICU service    [X] A ten-point review of systems was negative except as expressed in note.  [X] History was obtained from patient. If unable to participate in their care, history obtained from review of the chart and collateral sources of information.  ============================================================      HPI   81F with PMHx of HTN, Afib (on Xarelto), congential solitary kidney, DM, GERD, prior open cholecystectomy c/b suture granulomas s/p 2019 abdominal wall debridements (Dr. Banda), respiratory distress with prolonged ventilation during 11/2024 admission, s/p tracheostomy and gastrostomy and feeding jejunostomy tube (Dr. Champion), ya catheter dependence who presented to the ED from Nursing home with 4 days of progressively worsening abdominal pain. Patient found to have perforated duodenal ulcer on CT imaging; labs significant for leukocytosis and NOLA. Patient pending OR with surgical team; admitted to SICU for hemodynamic monitoring.       Pertinent Imaging  ACC: [id number] EXAM:  CT ABDOMEN AND PELVIS IC   ORDERED BY: ROSEANN MCNEIL     PROCEDURE DATE:  05/13/2025      IMPRESSION:  *1. Since November 8, 2024, new moderate mural thickening and   inflammation involving the proximal proximal duodenal wall, with focal   defect/outpouching along lateral duodenum, compatible with perforated   duodenal ulcer; no focal drainable fluid collection.  2. Additional chronic/incidental findings, as detailed above.    PAST MEDICAL & SURGICAL HISTORY  HTN (hypertension)  Diabetes mellitus  Obesity  Gastroesophageal reflux disease  Active asthma  Generalized OA  Afib      HOME MEDICATIONS    albuterol 2.5 mg/3 mL (0.083%) inhalation solution: 3 milliliter(s) by nebulizer every 6 hours as needed for  shortness of breath and/or wheezing  alendronate 70 mg oral tablet: 1 tab(s) orally once a week 5 AM on Thursday  cholecalciferol 400 intl units (10 mcg) oral tablet: 1 tab(s) orally once a day  ipratropium: 1 unit(s) by nebulizer every 6 hours (ipratropium bromide inhalation 0.02% solution)  losartan 100 mg oral tablet: 1 tab(s) orally once a day  Multiple Vitamins with Minerals oral liquid: 10 milliliter(s) by jejunostomy tube once a day  rivaroxaban 15 mg oral tablet: 1 tab(s) orally once a day (at bedtime)  Sensipar 30 mg oral tablet: 1 tab(s) orally every 12 hours    ACTIVE MEDICATIONS    pantoprazole Infusion 8 mG/Hr IV Continuous <Continuous>  sodium chloride 0.9%. 1000 milliLiter(s) IV Continuous <Continuous>    ALLERGIES  Allergies  No Known Allergies  Intolerances         VITAL SIGNS (Last 24Hours)  ICU Vital Signs Last 24 Hrs  T(C): 36.8 (13 May 2025 15:21), Max: 36.8 (13 May 2025 15:21)  T(F): 98.3 (13 May 2025 15:21), Max: 98.3 (13 May 2025 15:21)  HR: 91 (13 May 2025 19:29) (91 - 91)  BP: 98/55 (13 May 2025 19:29) (79/45 - 114/59)  BP(mean): 74 (13 May 2025 19:29) (8 - 78)  RR: 24 (13 May 2025 15:21) (24 - 24)  SpO2: 97% (13 May 2025 19:29) (86% - 97%)    O2 Parameters below as of 13 May 2025 19:29  Patient On (Oxygen Delivery Method): tracheostomy collar  O2 Flow (L/min): 10      LABS (Last 24Hours)                        11.1   15.53 )-----------( 180      ( 13 May 2025 19:55 )             33.8       05-13    134[L]  |  91[L]  |  72[HH]  ----------------------------<  83  4.8   |  25  |  3.9[H]    Ca    10.0      13 May 2025 15:45    TPro  5.8[L]  /  Alb  3.1[L]  /  TBili  0.7  /  DBili  x   /  AST  15  /  ALT  10  /  AlkPhos  120[H]  05-13            PHYSICAL EXAM   Exam: NAD, follows commands; able to write words with pen/paper and nodding yes/no responses.     RESPIRATORY:  Normal expansion/effort on trach collar.    CARDIOVASCULAR:   s1, S2. non tachycardic on monitor.     GASTROINTESTINAL:  Abdomen distended, RUQ tenderness with rebound tenderness. Not peritonitic     MUSCULOSKELETAL:  Extremities warm, pink, well-perfused.    SKIN:  No obvious skin breakdown; pending full DTI screening on admission to SICU    :   Exam: Ya catheter ON ADMISSION in place.     ----------------------------------------------------------------------------------------------------------  Tubes/Lines/Drains    [x] Peripheral IV    [ ] Urinary Catheter Ya  [ ]Present on Admission          Insertion Date:                                   [ ] Central Venous Line       [ ]IJ             [ ]Femoral     [ ]Subclavian   [ ]Left  [ ]Right      Insertion Date:          [ ] Arterial Line 	                [ ]Radial    [ ]Femoral     [ ]Axillary         [ ]Left  [ ]Right      Insertion Date:            PATRICIA SPRAGUE   [id number]   [birth year]    74yF  ============================================================   DATE OF INITIAL SICU/SDU CONSULT: 05-13-25    INDICATION FOR SICU CONSULT:  perforated duodenal ulcer      SICU COURSE EVENTS :  05-13 - admitted to SICU service     24HOUR EVENTS  -Admission under SICU service    [X] A ten-point review of systems was negative except as expressed in note.  [X] History was obtained from patient. If unable to participate in their care, history obtained from review of the chart and collateral sources of information.  ============================================================      HPI   81F with PMHx of HTN, Afib (on Xarelto), congential solitary kidney, DM, GERD, prior open cholecystectomy c/b suture granulomas s/p 2019 abdominal wall debridements (Dr. Banda), respiratory distress with prolonged ventilation during 11/2024 admission, s/p tracheostomy and gastrostomy and feeding jejunostomy tube (Dr. Champion), ya catheter dependence who presented to the ED from Nursing home with 4 days of progressively worsening abdominal pain. Patient found to have perforated duodenal ulcer on CT imaging; labs significant for leukocytosis and NOLA. Patient pending OR with surgical team; admitted to SICU for hemodynamic monitoring.       Pertinent Imaging  ACC: [id number] EXAM:  CT ABDOMEN AND PELVIS IC   ORDERED BY: ROSEANN MCNEIL     PROCEDURE DATE:  05/13/2025      IMPRESSION:  *1. Since November 8, 2024, new moderate mural thickening and   inflammation involving the proximal proximal duodenal wall, with focal   defect/outpouching along lateral duodenum, compatible with perforated   duodenal ulcer; no focal drainable fluid collection.  2. Additional chronic/incidental findings, as detailed above.    PAST MEDICAL & SURGICAL HISTORY  HTN (hypertension)  Diabetes mellitus  Obesity  Gastroesophageal reflux disease  Active asthma  Generalized OA  Afib      HOME MEDICATIONS    albuterol 2.5 mg/3 mL (0.083%) inhalation solution: 3 milliliter(s) by nebulizer every 6 hours as needed for  shortness of breath and/or wheezing  alendronate 70 mg oral tablet: 1 tab(s) orally once a week 5 AM on Thursday  cholecalciferol 400 intl units (10 mcg) oral tablet: 1 tab(s) orally once a day  ipratropium: 1 unit(s) by nebulizer every 6 hours (ipratropium bromide inhalation 0.02% solution)  losartan 100 mg oral tablet: 1 tab(s) orally once a day  Multiple Vitamins with Minerals oral liquid: 10 milliliter(s) by jejunostomy tube once a day  rivaroxaban 15 mg oral tablet: 1 tab(s) orally once a day (at bedtime)  Sensipar 30 mg oral tablet: 1 tab(s) orally every 12 hours    ACTIVE MEDICATIONS    MEDICATIONS  (STANDING):  acetaminophen   IVPB .. 1000 milliGRAM(s) IV Intermittent once  albuterol/ipratropium for Nebulization 3 milliLiter(s) Nebulizer every 6 hours  caspofungin IVPB 70 milliGRAM(s) IV Intermittent once  caspofungin IVPB 50 milliGRAM(s) IV Intermittent every 24 hours  caspofungin IVPB      chlorhexidine 2% Cloths 1 Application(s) Topical <User Schedule>  heparin   Injectable 5000 Unit(s) SubCutaneous every 8 hours  norepinephrine Infusion 0.05 MICROgram(s)/kG/Min (8.15 mL/Hr) IV Continuous <Continuous>  pantoprazole Infusion 8 mG/Hr (10 mL/Hr) IV Continuous <Continuous>  piperacillin/tazobactam IVPB.- 3.375 Gram(s) IV Intermittent once  piperacillin/tazobactam IVPB.. 3.375 Gram(s) IV Intermittent every 12 hours  sodium chloride 0.9%. 1000 milliLiter(s) (100 mL/Hr) IV Continuous <Continuous>    MEDICATIONS  (PRN):      ALLERGIES  Allergies  No Known Allergies  Intolerances         VITAL SIGNS (Last 24Hours)  ICU Vital Signs Last 24 Hrs  T(C): 36.8 (13 May 2025 15:21), Max: 36.8 (13 May 2025 15:21)  T(F): 98.3 (13 May 2025 15:21), Max: 98.3 (13 May 2025 15:21)  HR: 91 (13 May 2025 19:29) (91 - 91)  BP: 98/55 (13 May 2025 19:29) (79/45 - 114/59)  BP(mean): 74 (13 May 2025 19:29) (8 - 78)  RR: 24 (13 May 2025 15:21) (24 - 24)  SpO2: 97% (13 May 2025 19:29) (86% - 97%)    O2 Parameters below as of 13 May 2025 19:29  Patient On (Oxygen Delivery Method): tracheostomy collar  O2 Flow (L/min): 10      LABS (Last 24Hours)                        11.1   15.53 )-----------( 180      ( 13 May 2025 19:55 )             33.8       05-13    134[L]  |  91[L]  |  72[HH]  ----------------------------<  83  4.8   |  25  |  3.9[H]    Ca    10.0      13 May 2025 15:45    TPro  5.8[L]  /  Alb  3.1[L]  /  TBili  0.7  /  DBili  x   /  AST  15  /  ALT  10  /  AlkPhos  120[H]  05-13            PHYSICAL EXAM   Exam: NAD, follows commands; able to write words with pen/paper and nodding yes/no responses.     RESPIRATORY:  Normal expansion/effort on trach collar.    CARDIOVASCULAR:   s1, S2. non tachycardic on monitor.     GASTROINTESTINAL:  Abdomen distended, RUQ tenderness with rebound tenderness. Not peritonitic. GJ tube in place.     MUSCULOSKELETAL:  Extremities warm, pink, well-perfused.    SKIN:  No obvious skin breakdown; pending full DTI screening on admission to SICU    :   Exam: Ya catheter ON ADMISSION in place.     ----------------------------------------------------------------------------------------------------------  Tubes/Lines/Drains    [x] Peripheral IV    [ ] Urinary Catheter Ya  [ ]Present on Admission          Insertion Date:                                   [ ] Central Venous Line       [ ]IJ             [ ]Femoral     [ ]Subclavian   [ ]Left  [ ]Right      Insertion Date:          [ ] Arterial Line 	                [ ]Radial    [ ]Femoral     [ ]Axillary         [ ]Left  [ ]Right      Insertion Date:

## 2025-05-13 NOTE — H&P ADULT - NSHPLABSRESULTS_GEN_ALL_CORE
Referral received for consideration of patient for Acute Inpatient Rehab  After reviewing patients case with [facility] physician at this time recommendation is for a subacute inpatient rehabilitation (SNF/TCU) that offers a less intense inpatient rehab program with 1-2 hours of therapies daily to maximize function over a longer course of time  Provided update with same to [facility] through [employer]  05-13    134[L]  |  91[L]  |  72[HH]  ----------------------------<  83  4.8   |  25  |  3.9[H]    Ca    10.0      13 May 2025 15:45    TPro  5.8[L]  /  Alb  3.1[L]  /  TBili  0.7  /  DBili  x   /  AST  15  /  ALT  10  /  AlkPhos  120[H]  05-13                          11.1   15.53 )-----------( 180      ( 13 May 2025 19:55 )             33.8     < from: CT Abdomen and Pelvis w/ IV Cont (05.13.25 @ 18:16) >    IMPRESSION:    *1. Since November 8, 2024, new moderate mural thickening and   inflammation involving the proximal proximal duodenal wall, with focal   defect/outpouching along lateral duodenum, compatible with perforated   duodenal ulcer; no focal drainable fluid collection.  2. Additional chronic/incidental findings, as detailed above.    < end of copied text >

## 2025-05-13 NOTE — H&P ADULT - ASSESSMENT
ASSESSMENT:  81F PMHx HTN, a-fib (on xarelto), solitary kidney, DM, GERD, prior open cholecystectomy c/b suture granulomas s/p 2019 abdominal wall debridements by Dr. Banda, respiratory distress with prolonged ventilation during 11/2024 admission, s/p 11/19 tracheostomy and gastrostomy and feeding jejunostomy tube by Dr. Champion, ya catheter dependence who presented to the ED from NH on 5/13 with 4 days progressively worsening abdominal pain. Presentation and imaging consistent with duodenal perforation.    PLAN:  #Duodenal perforation  #Sepsis  - Admit to surgery under Dr. Pena  - SICU to evaluate  - NPO, IVF  - Vancomycin, zosyn, caspofungin  - Pt full code upon arrival to ED, final plan pending discussion with family regarding OR versus comfort measures given poor prognosis  - MAP goal > 65    #PMHx HTN  #PMHx a-fib  - Holding xarelto, home antihypertensives   - Last took xarelto 5/12 PM  - HSQ for DVT ppx    #PMHx DM, GERD, solitary kidney, ya dependence  - Q6 FSG, insulin sliding scale  - Strict I/Os    Discussed with attending.    Surgery (7095)

## 2025-05-14 ENCOUNTER — RESULT REVIEW (OUTPATIENT)
Age: 75
End: 2025-05-14

## 2025-05-14 DIAGNOSIS — J96.01 ACUTE RESPIRATORY FAILURE WITH HYPOXIA: ICD-10-CM

## 2025-05-14 DIAGNOSIS — A41.9 SEPSIS, UNSPECIFIED ORGANISM: ICD-10-CM

## 2025-05-14 DIAGNOSIS — K27.5 CHRONIC OR UNSPECIFIED PEPTIC ULCER, SITE UNSPECIFIED, WITH PERFORATION: ICD-10-CM

## 2025-05-14 DIAGNOSIS — Z51.5 ENCOUNTER FOR PALLIATIVE CARE: ICD-10-CM

## 2025-05-14 LAB
ALBUMIN SERPL ELPH-MCNC: 2.4 G/DL — LOW (ref 3.5–5.2)
ALBUMIN SERPL ELPH-MCNC: 2.7 G/DL — LOW (ref 3.5–5.2)
ALP SERPL-CCNC: 100 U/L — SIGNIFICANT CHANGE UP (ref 30–115)
ALP SERPL-CCNC: 114 U/L — SIGNIFICANT CHANGE UP (ref 30–115)
ALT FLD-CCNC: 16 U/L — SIGNIFICANT CHANGE UP (ref 0–41)
ALT FLD-CCNC: 18 U/L — SIGNIFICANT CHANGE UP (ref 0–41)
ANION GAP SERPL CALC-SCNC: 13 MMOL/L — SIGNIFICANT CHANGE UP (ref 7–14)
ANION GAP SERPL CALC-SCNC: 15 MMOL/L — HIGH (ref 7–14)
APTT BLD: 24.7 SEC — LOW (ref 27–39.2)
APTT BLD: 27 SEC — SIGNIFICANT CHANGE UP (ref 27–39.2)
AST SERPL-CCNC: 20 U/L — SIGNIFICANT CHANGE UP (ref 0–41)
AST SERPL-CCNC: 35 U/L — SIGNIFICANT CHANGE UP (ref 0–41)
BASE EXCESS BLDV CALC-SCNC: -12.2 MMOL/L — LOW (ref -2–3)
BASOPHILS # BLD AUTO: 0.04 K/UL — SIGNIFICANT CHANGE UP (ref 0–0.2)
BASOPHILS NFR BLD AUTO: 0.2 % — SIGNIFICANT CHANGE UP (ref 0–1)
BILIRUB DIRECT SERPL-MCNC: 0.2 MG/DL — SIGNIFICANT CHANGE UP (ref 0–0.3)
BILIRUB DIRECT SERPL-MCNC: 0.3 MG/DL — SIGNIFICANT CHANGE UP (ref 0–0.3)
BILIRUB INDIRECT FLD-MCNC: 0.2 MG/DL — SIGNIFICANT CHANGE UP (ref 0.2–1.2)
BILIRUB INDIRECT FLD-MCNC: 0.3 MG/DL — SIGNIFICANT CHANGE UP (ref 0.2–1.2)
BILIRUB SERPL-MCNC: 0.5 MG/DL — SIGNIFICANT CHANGE UP (ref 0.2–1.2)
BILIRUB SERPL-MCNC: 0.5 MG/DL — SIGNIFICANT CHANGE UP (ref 0.2–1.2)
BUN SERPL-MCNC: 44 MG/DL — HIGH (ref 10–20)
BUN SERPL-MCNC: 51 MG/DL — HIGH (ref 10–20)
CA-I SERPL-SCNC: 1.18 MMOL/L — SIGNIFICANT CHANGE UP (ref 1.15–1.33)
CALCIUM SERPL-MCNC: 8.3 MG/DL — LOW (ref 8.4–10.5)
CALCIUM SERPL-MCNC: 8.4 MG/DL — SIGNIFICANT CHANGE UP (ref 8.4–10.5)
CHLORIDE SERPL-SCNC: 102 MMOL/L — SIGNIFICANT CHANGE UP (ref 98–110)
CHLORIDE SERPL-SCNC: 104 MMOL/L — SIGNIFICANT CHANGE UP (ref 98–110)
CO2 SERPL-SCNC: 18 MMOL/L — SIGNIFICANT CHANGE UP (ref 17–32)
CO2 SERPL-SCNC: 19 MMOL/L — SIGNIFICANT CHANGE UP (ref 17–32)
CREAT SERPL-MCNC: 1.9 MG/DL — HIGH (ref 0.7–1.5)
CREAT SERPL-MCNC: 2.4 MG/DL — HIGH (ref 0.7–1.5)
CRP SERPL-MCNC: 154.6 MG/L — HIGH
EGFR: 21 ML/MIN/1.73M2 — LOW
EGFR: 21 ML/MIN/1.73M2 — LOW
EGFR: 28 ML/MIN/1.73M2 — LOW
EGFR: 28 ML/MIN/1.73M2 — LOW
EOSINOPHIL # BLD AUTO: 0 K/UL — SIGNIFICANT CHANGE UP (ref 0–0.7)
EOSINOPHIL NFR BLD AUTO: 0 % — SIGNIFICANT CHANGE UP (ref 0–8)
GAS PNL BLDA: SIGNIFICANT CHANGE UP
GAS PNL BLDV: 130 MMOL/L — LOW (ref 136–145)
GAS PNL BLDV: SIGNIFICANT CHANGE UP
GAS PNL BLDV: SIGNIFICANT CHANGE UP
GLUCOSE SERPL-MCNC: 136 MG/DL — HIGH (ref 70–99)
GLUCOSE SERPL-MCNC: 180 MG/DL — HIGH (ref 70–99)
HCO3 BLDV-SCNC: 14 MMOL/L — LOW (ref 22–29)
HCT VFR BLD CALC: 29.3 % — LOW (ref 37–47)
HCT VFR BLD CALC: 31.4 % — LOW (ref 37–47)
HCT VFR BLDA CALC: 28 % — LOW (ref 34.5–46.5)
HGB BLD CALC-MCNC: 9.2 G/DL — LOW (ref 11.7–16.1)
HGB BLD-MCNC: 9.4 G/DL — LOW (ref 12–16)
HGB BLD-MCNC: 9.6 G/DL — LOW (ref 12–16)
IMM GRANULOCYTES NFR BLD AUTO: 0.7 % — HIGH (ref 0.1–0.3)
INR BLD: 0.99 RATIO — SIGNIFICANT CHANGE UP (ref 0.65–1.3)
INR BLD: 1 RATIO — SIGNIFICANT CHANGE UP (ref 0.65–1.3)
LACTATE BLDV-MCNC: 1.5 MMOL/L — SIGNIFICANT CHANGE UP (ref 0.5–2)
LYMPHOCYTES # BLD AUTO: 2.05 K/UL — SIGNIFICANT CHANGE UP (ref 1.2–3.4)
LYMPHOCYTES # BLD AUTO: 9.2 % — LOW (ref 20.5–51.1)
MAGNESIUM SERPL-MCNC: 1.8 MG/DL — SIGNIFICANT CHANGE UP (ref 1.8–2.4)
MAGNESIUM SERPL-MCNC: 2 MG/DL — SIGNIFICANT CHANGE UP (ref 1.8–2.4)
MCHC RBC-ENTMCNC: 27.6 PG — SIGNIFICANT CHANGE UP (ref 27–31)
MCHC RBC-ENTMCNC: 28.2 PG — SIGNIFICANT CHANGE UP (ref 27–31)
MCHC RBC-ENTMCNC: 30.6 G/DL — LOW (ref 32–37)
MCHC RBC-ENTMCNC: 32.1 G/DL — SIGNIFICANT CHANGE UP (ref 32–37)
MCV RBC AUTO: 88 FL — SIGNIFICANT CHANGE UP (ref 81–99)
MCV RBC AUTO: 90.2 FL — SIGNIFICANT CHANGE UP (ref 81–99)
MONOCYTES # BLD AUTO: 1.69 K/UL — HIGH (ref 0.1–0.6)
MONOCYTES NFR BLD AUTO: 7.6 % — SIGNIFICANT CHANGE UP (ref 1.7–9.3)
MRSA PCR RESULT.: NEGATIVE — SIGNIFICANT CHANGE UP
NEUTROPHILS # BLD AUTO: 18.25 K/UL — HIGH (ref 1.4–6.5)
NEUTROPHILS NFR BLD AUTO: 82.3 % — HIGH (ref 42.2–75.2)
NRBC BLD AUTO-RTO: 0 /100 WBCS — SIGNIFICANT CHANGE UP (ref 0–0)
NRBC BLD AUTO-RTO: 0 /100 WBCS — SIGNIFICANT CHANGE UP (ref 0–0)
PCO2 BLDV: 33 MMHG — LOW (ref 39–42)
PH BLDV: 7.24 — LOW (ref 7.32–7.43)
PHOSPHATE SERPL-MCNC: 3.6 MG/DL — SIGNIFICANT CHANGE UP (ref 2.1–4.9)
PHOSPHATE SERPL-MCNC: 4.2 MG/DL — SIGNIFICANT CHANGE UP (ref 2.1–4.9)
PLATELET # BLD AUTO: 363 K/UL — SIGNIFICANT CHANGE UP (ref 130–400)
PLATELET # BLD AUTO: 407 K/UL — HIGH (ref 130–400)
PMV BLD: 10.1 FL — SIGNIFICANT CHANGE UP (ref 7.4–10.4)
PMV BLD: 11.3 FL — HIGH (ref 7.4–10.4)
PO2 BLDV: 66 MMHG — HIGH (ref 25–45)
POTASSIUM BLDV-SCNC: 4.7 MMOL/L — SIGNIFICANT CHANGE UP (ref 3.5–5.1)
POTASSIUM SERPL-MCNC: 4.6 MMOL/L — SIGNIFICANT CHANGE UP (ref 3.5–5)
POTASSIUM SERPL-MCNC: 4.7 MMOL/L — SIGNIFICANT CHANGE UP (ref 3.5–5)
POTASSIUM SERPL-SCNC: 4.6 MMOL/L — SIGNIFICANT CHANGE UP (ref 3.5–5)
POTASSIUM SERPL-SCNC: 4.7 MMOL/L — SIGNIFICANT CHANGE UP (ref 3.5–5)
PROT SERPL-MCNC: 4.4 G/DL — LOW (ref 6–8)
PROT SERPL-MCNC: 4.9 G/DL — LOW (ref 6–8)
PROTHROM AB SERPL-ACNC: 11.7 SEC — SIGNIFICANT CHANGE UP (ref 9.95–12.87)
PROTHROM AB SERPL-ACNC: 11.8 SEC — SIGNIFICANT CHANGE UP (ref 9.95–12.87)
RBC # BLD: 3.33 M/UL — LOW (ref 4.2–5.4)
RBC # BLD: 3.48 M/UL — LOW (ref 4.2–5.4)
RBC # FLD: 14.6 % — HIGH (ref 11.5–14.5)
RBC # FLD: 14.7 % — HIGH (ref 11.5–14.5)
SAO2 % BLDV: 93.5 % — HIGH (ref 67–88)
SODIUM SERPL-SCNC: 133 MMOL/L — LOW (ref 135–146)
SODIUM SERPL-SCNC: 138 MMOL/L — SIGNIFICANT CHANGE UP (ref 135–146)
WBC # BLD: 22.19 K/UL — HIGH (ref 4.8–10.8)
WBC # BLD: 22.35 K/UL — HIGH (ref 4.8–10.8)
WBC # FLD AUTO: 22.19 K/UL — HIGH (ref 4.8–10.8)
WBC # FLD AUTO: 22.35 K/UL — HIGH (ref 4.8–10.8)

## 2025-05-14 PROCEDURE — 99223 1ST HOSP IP/OBS HIGH 75: CPT

## 2025-05-14 PROCEDURE — 71045 X-RAY EXAM CHEST 1 VIEW: CPT | Mod: 26

## 2025-05-14 PROCEDURE — 36556 INSERT NON-TUNNEL CV CATH: CPT

## 2025-05-14 PROCEDURE — 88307 TISSUE EXAM BY PATHOLOGIST: CPT | Mod: 26

## 2025-05-14 PROCEDURE — 36620 INSERTION CATHETER ARTERY: CPT

## 2025-05-14 PROCEDURE — 88305 TISSUE EXAM BY PATHOLOGIST: CPT | Mod: 26

## 2025-05-14 PROCEDURE — 74018 RADEX ABDOMEN 1 VIEW: CPT | Mod: 26

## 2025-05-14 PROCEDURE — 43117 PARTIAL REMOVAL OF ESOPHAGUS: CPT

## 2025-05-14 PROCEDURE — 76937 US GUIDE VASCULAR ACCESS: CPT | Mod: 26

## 2025-05-14 RX ORDER — FENTANYL CITRATE-0.9 % NACL/PF 100MCG/2ML
25 SYRINGE (ML) INTRAVENOUS EVERY 4 HOURS
Refills: 0 | Status: DISCONTINUED | OUTPATIENT
Start: 2025-05-14 | End: 2025-05-17

## 2025-05-14 RX ORDER — PIPERACILLIN-TAZO-DEXTROSE,ISO 3.375G/5
3.38 IV SOLUTION, PIGGYBACK PREMIX FROZEN(ML) INTRAVENOUS EVERY 8 HOURS
Refills: 0 | Status: DISCONTINUED | OUTPATIENT
Start: 2025-05-14 | End: 2025-05-16

## 2025-05-14 RX ORDER — REMDESIVIR 5 MG/ML
100 INJECTION INTRAVENOUS EVERY 24 HOURS
Refills: 0 | Status: COMPLETED | OUTPATIENT
Start: 2025-05-15 | End: 2025-05-16

## 2025-05-14 RX ORDER — DEXTROSE 50 % IN WATER 50 %
12.5 SYRINGE (ML) INTRAVENOUS ONCE
Refills: 0 | Status: DISCONTINUED | OUTPATIENT
Start: 2025-05-14 | End: 2025-05-14

## 2025-05-14 RX ORDER — ALBUMIN (HUMAN) 12.5 G/50ML
100 INJECTION, SOLUTION INTRAVENOUS ONCE
Refills: 0 | Status: COMPLETED | OUTPATIENT
Start: 2025-05-14 | End: 2025-05-14

## 2025-05-14 RX ORDER — INSULIN LISPRO 100 U/ML
INJECTION, SOLUTION INTRAVENOUS; SUBCUTANEOUS
Refills: 0 | Status: DISCONTINUED | OUTPATIENT
Start: 2025-05-14 | End: 2025-05-14

## 2025-05-14 RX ORDER — DEXTROSE 50 % IN WATER 50 %
25 SYRINGE (ML) INTRAVENOUS ONCE
Refills: 0 | Status: DISCONTINUED | OUTPATIENT
Start: 2025-05-14 | End: 2025-05-14

## 2025-05-14 RX ORDER — GLUCAGON 3 MG/1
1 POWDER NASAL ONCE
Refills: 0 | Status: DISCONTINUED | OUTPATIENT
Start: 2025-05-14 | End: 2025-05-15

## 2025-05-14 RX ORDER — INSULIN LISPRO 100 U/ML
INJECTION, SOLUTION INTRAVENOUS; SUBCUTANEOUS EVERY 6 HOURS
Refills: 0 | Status: DISCONTINUED | OUTPATIENT
Start: 2025-05-14 | End: 2025-05-14

## 2025-05-14 RX ORDER — SODIUM BICARBONATE 1 MEQ/ML
0.03 SYRINGE (ML) INTRAVENOUS
Qty: 50 | Refills: 0 | Status: DISCONTINUED | OUTPATIENT
Start: 2025-05-14 | End: 2025-05-14

## 2025-05-14 RX ORDER — SODIUM BICARBONATE 1 MEQ/ML
0.17 SYRINGE (ML) INTRAVENOUS
Qty: 150 | Refills: 0 | Status: DISCONTINUED | OUTPATIENT
Start: 2025-05-14 | End: 2025-05-14

## 2025-05-14 RX ORDER — DEXMEDETOMIDINE HYDROCHLORIDE IN SODIUM CHLORIDE 4 UG/ML
0.2 INJECTION INTRAVENOUS
Qty: 400 | Refills: 0 | Status: DISCONTINUED | OUTPATIENT
Start: 2025-05-14 | End: 2025-05-14

## 2025-05-14 RX ORDER — NOREPINEPHRINE BITARTRATE 8 MG
0.05 SOLUTION INTRAVENOUS
Qty: 8 | Refills: 0 | Status: DISCONTINUED | OUTPATIENT
Start: 2025-05-14 | End: 2025-05-14

## 2025-05-14 RX ORDER — SODIUM CHLORIDE 9 G/1000ML
1000 INJECTION, SOLUTION INTRAVENOUS
Refills: 0 | Status: DISCONTINUED | OUTPATIENT
Start: 2025-05-14 | End: 2025-05-14

## 2025-05-14 RX ORDER — DEXTROSE 50 % IN WATER 50 %
15 SYRINGE (ML) INTRAVENOUS ONCE
Refills: 0 | Status: DISCONTINUED | OUTPATIENT
Start: 2025-05-14 | End: 2025-05-14

## 2025-05-14 RX ORDER — SODIUM CHLORIDE 9 G/1000ML
1000 INJECTION, SOLUTION INTRAVENOUS
Refills: 0 | Status: DISCONTINUED | OUTPATIENT
Start: 2025-05-14 | End: 2025-05-17

## 2025-05-14 RX ORDER — FENTANYL CITRATE-0.9 % NACL/PF 100MCG/2ML
12.5 SYRINGE (ML) INTRAVENOUS EVERY 4 HOURS
Refills: 0 | Status: DISCONTINUED | OUTPATIENT
Start: 2025-05-14 | End: 2025-05-17

## 2025-05-14 RX ORDER — ALBUTEROL SULFATE 2.5 MG/3ML
2 VIAL, NEBULIZER (ML) INHALATION EVERY 4 HOURS
Refills: 0 | Status: DISCONTINUED | OUTPATIENT
Start: 2025-05-14 | End: 2025-05-14

## 2025-05-14 RX ORDER — VASOPRESSIN 20 [USP'U]/ML
0.03 INJECTION INTRAVENOUS
Qty: 40 | Refills: 0 | Status: DISCONTINUED | OUTPATIENT
Start: 2025-05-14 | End: 2025-05-14

## 2025-05-14 RX ORDER — NOREPINEPHRINE BITARTRATE 8 MG
0.05 SOLUTION INTRAVENOUS
Qty: 16 | Refills: 0 | Status: DISCONTINUED | OUTPATIENT
Start: 2025-05-14 | End: 2025-05-17

## 2025-05-14 RX ORDER — NOREPINEPHRINE BITARTRATE 8 MG
0.05 SOLUTION INTRAVENOUS
Qty: 16 | Refills: 0 | Status: DISCONTINUED | OUTPATIENT
Start: 2025-05-14 | End: 2025-05-14

## 2025-05-14 RX ORDER — SODIUM CHLORIDE 9 G/1000ML
500 INJECTION, SOLUTION INTRAVENOUS ONCE
Refills: 0 | Status: DISCONTINUED | OUTPATIENT
Start: 2025-05-14 | End: 2025-05-14

## 2025-05-14 RX ORDER — MAGNESIUM SULFATE 500 MG/ML
2 SYRINGE (ML) INJECTION ONCE
Refills: 0 | Status: COMPLETED | OUTPATIENT
Start: 2025-05-14 | End: 2025-05-14

## 2025-05-14 RX ORDER — DEXMEDETOMIDINE HYDROCHLORIDE IN SODIUM CHLORIDE 4 UG/ML
0.2 INJECTION INTRAVENOUS
Qty: 400 | Refills: 0 | Status: DISCONTINUED | OUTPATIENT
Start: 2025-05-14 | End: 2025-05-17

## 2025-05-14 RX ORDER — SODIUM BICARBONATE 1 MEQ/ML
0.04 SYRINGE (ML) INTRAVENOUS
Qty: 75 | Refills: 0 | Status: DISCONTINUED | OUTPATIENT
Start: 2025-05-14 | End: 2025-05-14

## 2025-05-14 RX ORDER — SODIUM CHLORIDE 9 G/1000ML
500 INJECTION, SOLUTION INTRAVENOUS ONCE
Refills: 0 | Status: COMPLETED | OUTPATIENT
Start: 2025-05-14 | End: 2025-05-14

## 2025-05-14 RX ORDER — SODIUM BICARBONATE 1 MEQ/ML
0.09 SYRINGE (ML) INTRAVENOUS
Qty: 150 | Refills: 0 | Status: DISCONTINUED | OUTPATIENT
Start: 2025-05-14 | End: 2025-05-14

## 2025-05-14 RX ORDER — GLUCAGON 3 MG/1
1 POWDER NASAL ONCE
Refills: 0 | Status: DISCONTINUED | OUTPATIENT
Start: 2025-05-14 | End: 2025-05-14

## 2025-05-14 RX ORDER — VASOPRESSIN 20 [USP'U]/ML
0.03 INJECTION INTRAVENOUS
Qty: 40 | Refills: 0 | Status: DISCONTINUED | OUTPATIENT
Start: 2025-05-14 | End: 2025-05-20

## 2025-05-14 RX ORDER — SODIUM BICARBONATE 1 MEQ/ML
0.06 SYRINGE (ML) INTRAVENOUS
Qty: 50 | Refills: 0 | Status: DISCONTINUED | OUTPATIENT
Start: 2025-05-14 | End: 2025-05-14

## 2025-05-14 RX ORDER — REMDESIVIR 5 MG/ML
200 INJECTION INTRAVENOUS ONCE
Refills: 0 | Status: COMPLETED | OUTPATIENT
Start: 2025-05-14 | End: 2025-05-14

## 2025-05-14 RX ADMIN — VASOPRESSIN 4.5 UNIT(S)/MIN: 20 INJECTION INTRAVENOUS at 05:14

## 2025-05-14 RX ADMIN — Medication 25 GRAM(S): at 14:07

## 2025-05-14 RX ADMIN — ALBUMIN (HUMAN) 50 MILLILITER(S): 12.5 INJECTION, SOLUTION INTRAVENOUS at 06:56

## 2025-05-14 RX ADMIN — Medication 1 APPLICATION(S): at 05:29

## 2025-05-14 RX ADMIN — REMDESIVIR 200 MILLIGRAM(S): 5 INJECTION INTRAVENOUS at 16:06

## 2025-05-14 RX ADMIN — Medication 100 MILLILITER(S): at 05:21

## 2025-05-14 RX ADMIN — Medication 25 MICROGRAM(S): at 21:55

## 2025-05-14 RX ADMIN — HEPARIN SODIUM 5000 UNIT(S): 1000 INJECTION INTRAVENOUS; SUBCUTANEOUS at 05:19

## 2025-05-14 RX ADMIN — Medication 40 MILLIGRAM(S): at 18:08

## 2025-05-14 RX ADMIN — Medication 15 MILLILITER(S): at 18:08

## 2025-05-14 RX ADMIN — Medication 25 GRAM(S): at 23:48

## 2025-05-14 RX ADMIN — Medication 25 GRAM(S): at 22:39

## 2025-05-14 RX ADMIN — Medication 25 MICROGRAM(S): at 10:45

## 2025-05-14 RX ADMIN — HEPARIN SODIUM 5000 UNIT(S): 1000 INJECTION INTRAVENOUS; SUBCUTANEOUS at 22:39

## 2025-05-14 RX ADMIN — Medication 25 GRAM(S): at 05:28

## 2025-05-14 RX ADMIN — Medication 25 MICROGRAM(S): at 22:40

## 2025-05-14 RX ADMIN — Medication 25 MICROGRAM(S): at 11:30

## 2025-05-14 RX ADMIN — NOREPINEPHRINE BITARTRATE 4.08 MICROGRAM(S)/KG/MIN: 8 SOLUTION at 22:56

## 2025-05-14 RX ADMIN — Medication 500 MILLILITER(S): at 06:40

## 2025-05-14 RX ADMIN — SODIUM CHLORIDE 1000 MILLILITER(S): 9 INJECTION, SOLUTION INTRAVENOUS at 15:30

## 2025-05-14 RX ADMIN — DEXMEDETOMIDINE HYDROCHLORIDE IN SODIUM CHLORIDE 4.35 MICROGRAM(S)/KG/HR: 4 INJECTION INTRAVENOUS at 22:56

## 2025-05-14 RX ADMIN — Medication 50 MEQ/KG/HR: at 05:14

## 2025-05-14 RX ADMIN — DEXMEDETOMIDINE HYDROCHLORIDE IN SODIUM CHLORIDE 4.35 MICROGRAM(S)/KG/HR: 4 INJECTION INTRAVENOUS at 05:14

## 2025-05-14 RX ADMIN — NOREPINEPHRINE BITARTRATE 4.08 MICROGRAM(S)/KG/MIN: 8 SOLUTION at 05:15

## 2025-05-14 RX ADMIN — Medication 15 MILLILITER(S): at 05:19

## 2025-05-14 RX ADMIN — VASOPRESSIN 4.5 UNIT(S)/MIN: 20 INJECTION INTRAVENOUS at 22:56

## 2025-05-14 RX ADMIN — Medication 40 MILLIGRAM(S): at 05:15

## 2025-05-14 RX ADMIN — SODIUM CHLORIDE 110 MILLILITER(S): 9 INJECTION, SOLUTION INTRAVENOUS at 22:56

## 2025-05-14 RX ADMIN — HEPARIN SODIUM 5000 UNIT(S): 1000 INJECTION INTRAVENOUS; SUBCUTANEOUS at 14:53

## 2025-05-14 RX ADMIN — CASPOFUNGIN ACETATE 260 MILLIGRAM(S): 5 INJECTION, POWDER, LYOPHILIZED, FOR SOLUTION INTRAVENOUS at 05:29

## 2025-05-14 NOTE — BRIEF OPERATIVE NOTE - COMMENTS
levo 0.25, vaso .03 at end of case  Placement of left radial A line, left IJ triple lumen  6 Shiley uncuffed trach exchanged for 8 Portex cuffed prior to induction

## 2025-05-14 NOTE — CONSULT NOTE ADULT - ASSESSMENT
Pt is 81F PMHx HTN, a-fib (on xarelto), solitary kidney, DM, GERD, prior open cholecystectomy c/b suture granulomas s/p 2019 abdominal wall debridements by Dr. Banda, respiratory distress with prolonged ventilation during 11/2024 admission, s/p 11/19 tracheostomy and gastrostomy and feeding jejunostomy tube by Dr. Champion, ya catheter dependence who presented to the ED from NH on 5/13 with 4 days progressively worsening abdominal pain. Patient found to have perforated peptic ulcer and s/p ex-lap.     Discussed with patient's daughter, Cecilia, and introduced palliative care as a supportive role. She is aware of patient's perforated ulcer and her surgery. She understands that we are treating patient's BP with pressors and she is supposed to return to the OR. She is hoping that patient recovers and has no additional concerns at this time. She is agreeable for our team to follow-up later in the week.     Plan:  - wean pressors as able  - family would like ongoing medical/surgical management  - please notify palliative care if we should be involved with medical decision-making    Palliative care will continue to follow.   Please call [phone] with questions or concerns 24/7.     Reviewed documentation from: surgery, ID, neph  _____________  He Rhys Morales MD  Palliative Medicine  [facility]   [facility]  [phone]

## 2025-05-14 NOTE — CONSULT NOTE ADULT - SUBJECTIVE AND OBJECTIVE BOX
NEPHROLOGY CONSULTATION NOTE    THIS CONSULT IS INCOMPLETE / FULL CONSULT TO FOLLOW    Patient is a 74y Female whom presented to the hospital with     PAST MEDICAL & SURGICAL HISTORY:  HTN (hypertension)      Diabetes mellitus      Obesity      Gastroesophageal reflux disease      Active asthma      Generalized OA      Afib      H/O hernia repair  2011 and revised in 2013      History of cholecystectomy      Status post debridement      H/O tracheostomy      S/P gastrostomy      S/P jejunostomy        Allergies:  No Known Allergies    Home Medications Reviewed  Hospital Medications:   MEDICATIONS  (STANDING):  acetaminophen   IVPB .. 1000 milliGRAM(s) IV Intermittent once  albuterol    90 MICROgram(s) HFA Inhaler 2 Puff(s) Inhalation every 4 hours  caspofungin IVPB 50 milliGRAM(s) IV Intermittent every 24 hours  caspofungin IVPB      chlorhexidine 0.12% Liquid 15 milliLiter(s) Oral Mucosa every 12 hours  chlorhexidine 2% Cloths 1 Application(s) Topical <User Schedule>  dexMEDEtomidine Infusion 0.2 MICROgram(s)/kG/Hr (4.35 mL/Hr) IV Continuous <Continuous>  heparin   Injectable 5000 Unit(s) SubCutaneous every 8 hours  norepinephrine Infusion 0.05 MICROgram(s)/kG/Min (8.16 mL/Hr) IV Continuous <Continuous>  pantoprazole  Injectable 40 milliGRAM(s) IV Push every 12 hours  piperacillin/tazobactam IVPB.. 3.375 Gram(s) IV Intermittent every 12 hours  sodium bicarbonate  Infusion 0.172 mEq/kG/Hr (100 mL/Hr) IV Continuous <Continuous>      SOCIAL HISTORY:  Denies ETOH,Smoking,   FAMILY HISTORY:        REVIEW OF SYSTEMS:  CONSTITUTIONAL: No weakness, fevers or chills  EYES/ENT: No visual changes;  No vertigo or throat pain   NECK: No pain or stiffness  RESPIRATORY: No cough, wheezing, hemoptysis; No shortness of breath  CARDIOVASCULAR: No chest pain or palpitations.  GASTROINTESTINAL: No abdominal or epigastric pain. No nausea, vomiting, or hematemesis; No diarrhea or constipation. No melena or hematochezia.  GENITOURINARY: No dysuria, frequency, foamy urine, urinary urgency, incontinence or hematuria  NEUROLOGICAL: No numbness or weakness  SKIN: No itching, burning, rashes, or lesions   VASCULAR: No bilateral lower extremity edema.   All other review of systems is negative unless indicated above.    VITALS:  T(F): 96.6 (05-14-25 @ 08:00), Max: 98.3 (05-13-25 @ 15:21)  HR: 80 (05-14-25 @ 09:00)  BP: 113/47 (05-14-25 @ 09:00)  RR: 21 (05-14-25 @ 09:00)  SpO2: 100% (05-14-25 @ 09:00)    05-13 @ 07:01  -  05-14 @ 07:00  --------------------------------------------------------  IN: 1415.6 mL / OUT: 120 mL / NET: 1295.6 mL    05-14 @ 07:01  -  05-14 @ 09:21  --------------------------------------------------------  IN: 336.1 mL / OUT: 250 mL / NET: 86.1 mL      Height (cm): 167.6 (05-14 @ 00:10)  Weight (kg): 87 (05-14 @ 00:10)  BMI (kg/m2): 31 (05-14 @ 00:10)  BSA (m2): 1.96 (05-14 @ 00:10)    05-13-25 @ 07:01  -  05-14-25 @ 07:00  --------------------------------------------------------  IN: 0 mL / OUT: 120 mL / NET: -120 mL    05-14-25 @ 07:01  -  05-14-25 @ 09:21  --------------------------------------------------------  IN: 0 mL / OUT: 250 mL / NET: -250 mL      I&O's Detail    13 May 2025 07:01  -  14 May 2025 07:00  --------------------------------------------------------  IN:    Dexmedetomidine: 17.6 mL    IV PiggyBack: 350 mL    Norepinephrine: 124 mL    Sodium Bicarbonate: 200 mL    sodium chloride 0.9%: 200 mL    Sodium Chloride 0.9% Bolus: 500 mL    Vasopressin: 18 mL    Vasopressin (Organ Donation): 6 mL  Total IN: 1415.6 mL    OUT:    Drain (mL): 0 mL    Nasogastric/Oral tube (mL): 0 mL    Ureteral Catheter (mL): 120 mL    VAC (Vacuum Assisted Closure) System (mL): 0 mL  Total OUT: 120 mL    Total NET: 1295.6 mL      14 May 2025 07:01  -  14 May 2025 09:21  --------------------------------------------------------  IN:    Dexmedetomidine: 8.8 mL    Norepinephrine: 127.3 mL    Sodium Bicarbonate: 100 mL    Sodium Bicarbonate: 100 mL  Total IN: 336.1 mL    OUT:    Drain (mL): 0 mL    Nasogastric/Oral tube (mL): 0 mL    Ureteral Catheter (mL): 250 mL    VAC (Vacuum Assisted Closure) System (mL): 0 mL  Total OUT: 250 mL    Total NET: 86.1 mL            PHYSICAL EXAM:  Constitutional: NAD  HEENT: anicteric sclera, oropharynx clear, MMM  Neck: No JVD  Respiratory: CTAB, no wheezes, rales or rhonchi  Cardiovascular: S1, S2, RRR  Gastrointestinal: BS+, soft, NT/ND  Extremities: No cyanosis or clubbing. No peripheral edema  Neurological: A/O x 3, no focal deficits  Psychiatric: Normal mood, normal affect  : No CVA tenderness. No ya.   Skin: No rashes  Vascular Access:    LABS:  05-14    133[L]  |  102  |  51[H]  ----------------------------<  136[H]  4.6   |  18  |  2.4[H]    Ca    8.4      14 May 2025 04:10  Phos  4.2     05-14  Mg     2.0     05-14    TPro  4.4[L]  /  Alb  2.4[L]  /  TBili  0.5  /  DBili  0.3  /  AST  35  /  ALT  18  /  AlkPhos  114  05-14    Creatinine Trend: 2.4 <--, 3.9 <--, 0.6 <--, 0.6 <--, 0.6 <--, 0.6 <--, 0.6 <--, 0.6 <--, 0.5 <--                        9.6    22.35 )-----------( 363      ( 14 May 2025 04:10 )             31.4     Urine Studies:  Urinalysis Basic - ( 14 May 2025 04:10 )    Color:  / Appearance:  / SG:  / pH:   Gluc: 136 mg/dL / Ketone:   / Bili:  / Urobili:    Blood:  / Protein:  / Nitrite:    Leuk Esterase:  / RBC:  / WBC    Sq Epi:  / Non Sq Epi:  / Bacteria:             PTH -- (Ca --)      [04-11-25 @ 10:15]   195  PTH -- (Ca 12.6)      [04-10-25 @ 10:32]   132  Vitamin D (25OH) 30      [04-10-25 @ 10:32]  TSH 0.63      [04-16-25 @ 09:40]  Lipid: chol 133, , HDL 34, LDL --      [11-20-24 @ 04:32]          RADIOLOGY & ADDITIONAL STUDIES:                 NEPHROLOGY CONSULTATION NOTE    81F PMHx HTN, a-fib (on xarelto), solitary kidney, DM, GERD, prior open cholecystectomy c/b suture granulomas s/p 2019 abdominal wall debridements by Dr. Banda, respiratory distress with prolonged ventilation during 11/2024 admission, s/p 11/19 tracheostomy and gastrostomy and feeding jejunostomy tube by Dr. Champion, ya catheter dependence who presented to the ED from NH on 5/13 with 4 days progressively worsening abdominal pain. Surgery consulted for imaging concerning for duodenal perforation.  sp laparotomy OR yesterday . Could not have wound closure because of instability   Renal called for [facility]     PAST MEDICAL & SURGICAL HISTORY:    HTN (hypertension)  Diabetes mellitus  Obesity  Gastroesophageal reflux disease  Active asthma  Generalized OA  Afib  H/O hernia repair 2011 and revised in 2013  History of cholecystectomy  Status post debridement  H/O tracheostomy  S/P gastrostomy  S/P jejunostomy        Allergies:  No Known Allergies    Home Medications Reviewed  Hospital Medications:   MEDICATIONS  (STANDING):  acetaminophen   IVPB .. 1000 milliGRAM(s) IV Intermittent once  albuterol    90 MICROgram(s) HFA Inhaler 2 Puff(s) Inhalation every 4 hours  caspofungin IVPB 50 milliGRAM(s) IV Intermittent every 24 hours  dexMEDEtomidine Infusion 0.2 MICROgram(s)/kG/Hr (4.35 mL/Hr) IV Continuous <Continuous>  heparin   Injectable 5000 Unit(s) SubCutaneous every 8 hours  norepinephrine Infusion 0.05 MICROgram(s)/kG/Min (8.16 mL/Hr) IV Continuous <Continuous>  pantoprazole  Injectable 40 milliGRAM(s) IV Push every 12 hours  piperacillin/tazobactam IVPB.. 3.375 Gram(s) IV Intermittent every 12 hours  sodium bicarbonate  Infusion 0.172 mEq/kG/Hr (100 mL/Hr) IV Continuous <Continuous>      SOCIAL HISTORY:  Denies ETOH,Smoking,   FAMILY HISTORY:        REVIEW OF SYSTEMS:  All other review of systems is negative unless indicated above.    VITALS:  T(F): 96.6 (05-14-25 @ 08:00), Max: 98.3 (05-13-25 @ 15:21)  HR: 80 (05-14-25 @ 09:00)  BP: 113/47 (05-14-25 @ 09:00)  RR: 21 (05-14-25 @ 09:00)  SpO2: 100% (05-14-25 @ 09:00)    05-13 @ 07:01  -  05-14 @ 07:00  --------------------------------------------------------  IN: 1415.6 mL / OUT: 120 mL / NET: 1295.6 mL    05-14 @ 07:01  -  05-14 @ 09:21  --------------------------------------------------------  IN: 336.1 mL / OUT: 250 mL / NET: 86.1 mL      Height (cm): 167.6 (05-14 @ 00:10)  Weight (kg): 87 (05-14 @ 00:10)  BMI (kg/m2): 31 (05-14 @ 00:10)  BSA (m2): 1.96 (05-14 @ 00:10)    05-13-25 @ 07:01  -  05-14-25 @ 07:00  --------------------------------------------------------  IN: 0 mL / OUT: 120 mL / NET: -120 mL    05-14-25 @ 07:01  -  05-14-25 @ 09:21  --------------------------------------------------------  IN: 0 mL / OUT: 250 mL / NET: -250 mL      I&O's Detail    13 May 2025 07:01  -  14 May 2025 07:00  --------------------------------------------------------  IN:    Dexmedetomidine: 17.6 mL    IV PiggyBack: 350 mL    Norepinephrine: 124 mL    Sodium Bicarbonate: 200 mL    sodium chloride 0.9%: 200 mL    Sodium Chloride 0.9% Bolus: 500 mL    Vasopressin: 18 mL    Vasopressin (Organ Donation): 6 mL  Total IN: 1415.6 mL    OUT:    Drain (mL): 0 mL    Nasogastric/Oral tube (mL): 0 mL    Ureteral Catheter (mL): 120 mL    VAC (Vacuum Assisted Closure) System (mL): 0 mL  Total OUT: 120 mL    Total NET: 1295.6 mL      14 May 2025 07:01  -  14 May 2025 09:21  --------------------------------------------------------  IN:    Dexmedetomidine: 8.8 mL    Norepinephrine: 127.3 mL    Sodium Bicarbonate: 100 mL    Sodium Bicarbonate: 100 mL  Total IN: 336.1 mL    OUT:    Drain (mL): 0 mL    Nasogastric/Oral tube (mL): 0 mL    Ureteral Catheter (mL): 250 mL    VAC (Vacuum Assisted Closure) System (mL): 0 mL  Total OUT: 250 mL    Total NET: 86.1 mL            PHYSICAL EXAM:  Constitutional: trached on MV   Respiratory: CTAB, no wheezes, rales or rhonchi  Cardiovascular: S1, S2, RRR  Gastrointestinal: dressing on abdomen   Extremities: No cyanosis or clubbing. No peripheral edema  :  ya.   Skin: No rashes  Vascular Access:    LABS:  05-14    133[L]  |  102  |  51[H]  ----------------------------<  136[H]  4.6   |  18  |  2.4[H]    Ca    8.4      14 May 2025 04:10  Phos  4.2     05-14  Mg     2.0     05-14    TPro  4.4[L]  /  Alb  2.4[L]  /  TBili  0.5  /  DBili  0.3  /  AST  35  /  ALT  18  /  AlkPhos  114  05-14    Creatinine Trend: 2.4 <--, 3.9 <--, 0.6 <--, 0.6 <--, 0.6 <--, 0.6 <--, 0.6 <--, 0.6 <--, 0.5 <--                        9.6    22.35 )-----------( 363      ( 14 May 2025 04:10 )             31.4     Urine Studies:  Urinalysis Basic - ( 14 May 2025 04:10 )    Color:  / Appearance:  / SG:  / pH:   Gluc: 136 mg/dL / Ketone:   / Bili:  / Urobili:    Blood:  / Protein:  / Nitrite:    Leuk Esterase:  / RBC:  / WBC    Sq Epi:  / Non Sq Epi:  / Bacteria:             PTH -- (Ca --)      [04-11-25 @ 10:15]   195  PTH -- (Ca 12.6)      [04-10-25 @ 10:32]   132  Vitamin D (25OH) 30      [04-10-25 @ 10:32]  TSH 0.63      [04-16-25 @ 09:40]  Lipid: chol 133, , HDL 34, LDL --      [11-20-24 @ 04:32]          RADIOLOGY & ADDITIONAL STUDIES:  < from: CT Abdomen and Pelvis w/ IV Cont (05.13.25 @ 18:16) >  KIDNEYS/URETERS: Atrophic right kidney. No hydronephrosis.    < end of copied text >  < from: CT Abdomen and Pelvis w/ IV Cont (05.13.25 @ 18:16) >  *1. Since November 8, 2024, new moderate mural thickening and   inflammation involving the proximal proximal duodenal wall, with focal   defect/outpouching along lateral duodenum, compatible with perforated   duodenal ulcer; no focal drainable fluid collection.  2. Additional chronic/incidental findings, as detailed above.    < end of copied text >

## 2025-05-14 NOTE — PROGRESS NOTE ADULT - SUBJECTIVE AND OBJECTIVE BOX
GENERAL SURGERY PROGRESS NOTE     PATRICIA SPRAGUE  [address] [place] [address] day :2d    POD:0d  Procedure: Partial antrectomy      Surgical Attending: Yumi Pena  Overnight events: Pt now s/p Exploratory laparotomy, distal antrectomy, D1 stapled off abthera placement. Pt remains intubated, sedated, mechanically ventilated with setting 400/12/80/10. She remains on levo and vaso.     T(F): 98.3 (05-13-25 @ 15:21), Max: 98.3 (05-13-25 @ 15:21)  HR: 77 (05-14-25 @ 03:15) (76 - 93)  BP: 102/55 (05-14-25 @ 00:10) (79/45 - 114/59)  ABP: --  ABP(mean): --  RR: 20 (05-14-25 @ 00:10) (20 - 24)  SpO2: 99% (05-14-25 @ 03:15) (86% - 100%)    IN'S / OUT's:      PHYSICAL EXAM:  GENERAL: NAD, intubated and sedated   CHEST/LUNG: Equal chest rise b/l, mechanically ventilated  HEART: Regular rate and rhythm  ABDOMEN: Soft, Nontender, Nondistended; Abthera in place with good seal, GJ tube in place and clamped  EXTREMITIES:  No clubbing, cyanosis, or edema      LABS  Labs:  CAPILLARY BLOOD GLUCOSE      POCT Blood Glucose.: 129 mg/dL (14 May 2025 03:31)                          9.6    22.35 )-----------( 363      ( 14 May 2025 04:10 )             31.4       Auto Immature Granulocyte %: 0.6 % (05-13-25 @ 19:55)  Auto Immature Granulocyte %: 0.6 % (05-13-25 @ 15:45)    05-14    133[L]  |  102  |  51[H]  ----------------------------<  136[H]  4.6   |  18  |  2.4[H]      Calcium: 8.4 mg/dL (05-14-25 @ 04:10)      LFTs:             4.4  | 0.5  | 35       ------------------[114     ( 14 May 2025 04:10 )  2.4  | 0.3  | 18          Lipase:x      Amylase:x         Blood Gas Arterial, Lactate: 1.2 mmol/L (05-14-25 @ 06:58)  Blood Gas Arterial, Lactate: 1.0 mmol/L (05-14-25 @ 04:17)  Lactate, Blood: 1.1 mmol/L (05-13-25 @ 19:55)  Blood Gas Venous - Lactate: 1.3 mmol/L (05-13-25 @ 15:59)  Lactate, Blood: 1.2 mmol/L (05-13-25 @ 15:45)    ABG - ( 14 May 2025 06:58 )  pH: 7.16  /  pCO2: 44    /  pO2: 197   / HCO3: 16    / Base Excess: -12.3 /  SaO2: 99.2            ABG - ( 14 May 2025 04:17 )  pH: 7.15  /  pCO2: 48    /  pO2: 158   / HCO3: 17    / Base Excess: -11.7 /  SaO2: 98.5              Coags:     11.70  ----< 0.99    ( 14 May 2025 04:10 )     24.7                Urinalysis Basic - ( 14 May 2025 04:10 )    Color: x / Appearance: x / SG: x / pH: x  Gluc: 136 mg/dL / Ketone: x  / Bili: x / Urobili: x   Blood: x / Protein: x / Nitrite: x   Leuk Esterase: x / RBC: x / WBC x   Sq Epi: x / Non Sq Epi: x / Bacteria: x        Urinalysis with Rflx Culture (collected 13 May 2025 17:22)          RADIOLOGY & ADDITIONAL TESTS:      A/P:  PATRICIA SPRAGUE is a 74F found to have perforated peptic ulcer now s/p ex lap, distal antrectomy, abthera placement requiring vasopressors.        PLAN:   - plan for RTOR 5/14 for second look, possible jejunostomy tube placement  - monitor vent settings wan as tolerated   - monitor pressor requirements, wean as tolerated   - continue caspofungin and zosyn  - Keep GJ tube clamped   - NGT to suction, monitor output  - strict I&O - monitor urine output  - DVT and GI ppx, hold xarelto  - rest of care per SICU    #Antibiotics: caspofungin IVPB 50 milliGRAM(s) IV Intermittent every 24 hours, 05-14-25 @ 23:30  caspofungin IVPB    , 05-13-25 @ 23:30  piperacillin/tazobactam IVPB.. 3.375 Gram(s) IV Intermittent every 12 hours, 05-14-25 @ 18:00   Day **  #DVT ppx: heparin   Injectable 5000 Unit(s) SubCutaneous every 8 hours    #GI ppx: pantoprazole  Injectable 40 milliGRAM(s) IV Push every 12 hours     Disposition:  SICU    Above plan to be discussed with Attending Surgeon Dr. Tyson  , patient, patient family, and rest of health care team    TAP (Trauma, Acute care, Pediatrics) Spectra [phone]     GENERAL SURGERY PROGRESS NOTE     PATRICIA SPRAGUE  [address] [place] [address] day :2d    POD:0d  Procedure: Partial antrectomy      Surgical Attending: Yumi Pena  Overnight events: Pt seen and examined at bedside and Post op check completed on rounds. Pt now s/p Exploratory laparotomy, distal antrectomy, D1 stapled off abthera placement. Pt remains intubated, sedated, mechanically ventilated with setting 400/12/80/10. She remains on levo and vaso.     T(F): 98.3 (05-13-25 @ 15:21), Max: 98.3 (05-13-25 @ 15:21)  HR: 77 (05-14-25 @ 03:15) (76 - 93)  BP: 102/55 (05-14-25 @ 00:10) (79/45 - 114/59)  ABP: --  ABP(mean): --  RR: 20 (05-14-25 @ 00:10) (20 - 24)  SpO2: 99% (05-14-25 @ 03:15) (86% - 100%)    IN'S / OUT's:      PHYSICAL EXAM:  GENERAL: NAD, intubated and sedated   CHEST/LUNG: Equal chest rise b/l, mechanically ventilated  HEART: Regular rate and rhythm  ABDOMEN: Soft, Nontender, Nondistended; Abthera in place with good seal, GJ tube in place and clamped  EXTREMITIES:  No clubbing, cyanosis, or edema      LABS  Labs:  CAPILLARY BLOOD GLUCOSE      POCT Blood Glucose.: 129 mg/dL (14 May 2025 03:31)                          9.6    22.35 )-----------( 363      ( 14 May 2025 04:10 )             31.4       Auto Immature Granulocyte %: 0.6 % (05-13-25 @ 19:55)  Auto Immature Granulocyte %: 0.6 % (05-13-25 @ 15:45)    05-14    133[L]  |  102  |  51[H]  ----------------------------<  136[H]  4.6   |  18  |  2.4[H]      Calcium: 8.4 mg/dL (05-14-25 @ 04:10)      LFTs:             4.4  | 0.5  | 35       ------------------[114     ( 14 May 2025 04:10 )  2.4  | 0.3  | 18          Lipase:x      Amylase:x         Blood Gas Arterial, Lactate: 1.2 mmol/L (05-14-25 @ 06:58)  Blood Gas Arterial, Lactate: 1.0 mmol/L (05-14-25 @ 04:17)  Lactate, Blood: 1.1 mmol/L (05-13-25 @ 19:55)  Blood Gas Venous - Lactate: 1.3 mmol/L (05-13-25 @ 15:59)  Lactate, Blood: 1.2 mmol/L (05-13-25 @ 15:45)    ABG - ( 14 May 2025 06:58 )  pH: 7.16  /  pCO2: 44    /  pO2: 197   / HCO3: 16    / Base Excess: -12.3 /  SaO2: 99.2            ABG - ( 14 May 2025 04:17 )  pH: 7.15  /  pCO2: 48    /  pO2: 158   / HCO3: 17    / Base Excess: -11.7 /  SaO2: 98.5              Coags:     11.70  ----< 0.99    ( 14 May 2025 04:10 )     24.7                Urinalysis Basic - ( 14 May 2025 04:10 )    Color: x / Appearance: x / SG: x / pH: x  Gluc: 136 mg/dL / Ketone: x  / Bili: x / Urobili: x   Blood: x / Protein: x / Nitrite: x   Leuk Esterase: x / RBC: x / WBC x   Sq Epi: x / Non Sq Epi: x / Bacteria: x        Urinalysis with Rflx Culture (collected 13 May 2025 17:22)          RADIOLOGY & ADDITIONAL TESTS:      A/P:  PATRICIA SPRAGUE is a 74F found to have perforated peptic ulcer now s/p ex lap, distal antrectomy, abthera placement requiring vasopressors.        PLAN:   - plan for RTOR 5/14 for second look, possible jejunostomy tube placement  - monitor vent settings wan as tolerated   - monitor pressor requirements, wean as tolerated   - continue caspofungin and zosyn  - Keep GJ tube clamped   - NGT to suction, monitor output  - strict I&O - monitor urine output  - DVT and GI ppx, hold xarelto  - rest of care per SICU    #Antibiotics: caspofungin IVPB 50 milliGRAM(s) IV Intermittent every 24 hours, 05-14-25 @ 23:30  caspofungin IVPB    , 05-13-25 @ 23:30  piperacillin/tazobactam IVPB.. 3.375 Gram(s) IV Intermittent every 12 hours, 05-14-25 @ 18:00   Day **  #DVT ppx: heparin   Injectable 5000 Unit(s) SubCutaneous every 8 hours    #GI ppx: pantoprazole  Injectable 40 milliGRAM(s) IV Push every 12 hours     Disposition:  SICU    Above plan to be discussed with Attending Surgeon Dr. Tyson  , patient, patient family, and rest of health care team    TAP (Trauma, Acute care, Pediatrics) Spectra [phone]

## 2025-05-14 NOTE — PROGRESS NOTE ADULT - SUBJECTIVE AND OBJECTIVE BOX
PATRICIA SPRAGUE   [id number]   [birth year]    74yF  ============================================================   DATE OF INITIAL SICU/SDU CONSULT: 05-13-25    INDICATION FOR SICU CONSULT:  perforated duodenal ulcer      SICU COURSE EVENTS :  05-13 - admitted to SICU service  05-14 - s/p OR for ex lap, distal antrectomy, D1 stapled off, abthera placement. Remained on mechanical ventilation post-op and required vasopressors.      24HOUR EVENTS  -Admission under SICU service. S/p OR     OR STATS:  Time: 2 hours  EBL: 100cc  UOP: 100cc  IVF: 4L  Blood: none  Operative findings:  prepyloric ulcer 4 cm perforation anterior stomach s/p antrectomy, D1 stapled off due to friable tissue to ensure healthy tissue for staple line. Temporary abdominal closure due to HD instability.    5/13  NIGHT  - OR ABG 7.21/43/77/17, lactate 1.2  - remaining on vent post-op  - on vaso @0.03 and levophed    [X] A ten-point review of systems was negative except as expressed in note.  [X] History was obtained from patient. If unable to participate in their care, history obtained from review of the chart and collateral sources of information.  ============================================================    PATRICIA SPRAGUE   [id number]   [birth year]    74yF  ============================================================   DATE OF INITIAL SICU/SDU CONSULT: 05-13-25    INDICATION FOR SICU CONSULT:  perforated duodenal ulcer      SICU COURSE EVENTS :  05-13 - admitted to SICU service  05-14 - s/p OR for ex lap, distal antrectomy, D1 stapled off, abthera placement. Remained on mechanical ventilation post-op and required vasopressors.      24HOUR EVENTS  5/13  NIGHT  - OR ABG 7.21/43/77/17, lactate 1.2  - remaining on vent post-op  - on vaso @0.03 and levophed    [X] A ten-point review of systems was negative except as expressed in note.  [X] History was obtained from patient. If unable to participate in their care, history obtained from review of the chart and collateral sources of information.  ============================================================   Daily Height in cm: 167.6 (14 May 2025 00:10)    Daily   Diet, NPO (05-13-25 @ 22:39)    CURRENT MEDS:  Neurologic Medications  acetaminophen   IVPB .. 1000 milliGRAM(s) IV Intermittent once  dexMEDEtomidine Infusion 0.2 MICROgram(s)/kG/Hr IV Continuous <Continuous>  fentaNYL    Injectable 12.5 MICROGram(s) IV Push every 4 hours PRN Moderate Pain (4 - 6)  fentaNYL    Injectable 25 MICROGram(s) IV Push every 4 hours PRN Severe Pain (7 - 10)    Respiratory Medications  albuterol/ipratropium for Nebulization 3 milliLiter(s) Nebulizer every 6 hours    Cardiovascular Medications  norepinephrine Infusion 0.05 MICROgram(s)/kG/Min IV Continuous <Continuous>    Gastrointestinal Medications  pantoprazole  Injectable 40 milliGRAM(s) IV Push every 12 hours  sodium bicarbonate  Infusion 0.057 mEq/kG/Hr IV Continuous <Continuous>    Genitourinary Medications    Hematologic/Oncologic Medications  heparin   Injectable 5000 Unit(s) SubCutaneous every 8 hours    Antimicrobial/Immunologic Medications  caspofungin IVPB 50 milliGRAM(s) IV Intermittent every 24 hours  caspofungin IVPB      piperacillin/tazobactam IVPB.. 3.375 Gram(s) IV Intermittent every 12 hours    Endocrine/Metabolic Medications    Topical/Other Medications  chlorhexidine 0.12% Liquid 15 milliLiter(s) Oral Mucosa every 12 hours  chlorhexidine 2% Cloths 1 Application(s) Topical <User Schedule>    ICU Vital Signs Last 24 Hrs  T(C): 36.8 (14 May 2025 00:10), Max: 36.8 (13 May 2025 15:21)  T(F): 98.3 (13 May 2025 15:21), Max: 98.3 (13 May 2025 15:21)  HR: 77 (14 May 2025 03:15) (76 - 93)  BP: 102/55 (14 May 2025 00:10) (79/45 - 114/59)  BP(mean): 72 (14 May 2025 00:10) (8 - 78)  ABP: --  ABP(mean): --  RR: 20 (14 May 2025 00:10) (20 - 24)  SpO2: 99% (14 May 2025 03:15) (86% - 100%)    O2 Parameters below as of 14 May 2025 00:10    O2 Flow (L/min): 10  O2 Concentration (%): 50      Mode: AC/ CMV (Assist Control/ Continuous Mandatory Ventilation)  RR (machine): 12  TV (machine): 400  FiO2: 80  PEEP: 10  ITime: 1  MAP: 13  PIP: 25    ABG - ( 14 May 2025 06:58 )  pH, Arterial: 7.16  pH, Blood: x     /  pCO2: 44    /  pO2: 197   / HCO3: 16    / Base Excess: -12.3 /  SaO2: 99.2          I&O's Summary    I&O's Detail      PHYSICAL EXAM:     a&ox3  follows commands, SEPULVEDA  no acute distress  equal chest rise b/l  abdomen soft  extremities soft  urinary cath in place   PATRICIA SPRAGUE   [id number]   [birth year]    74yF  ============================================================   DATE OF INITIAL SICU/SDU CONSULT: 05-13-25    INDICATION FOR SICU CONSULT:  perforated duodenal ulcer      SICU COURSE EVENTS :  05-13 - admitted to SICU service  05-14 - s/p OR for ex lap, distal antrectomy, D1 stapled off, abthera placement. Remained on mechanical ventilation post-op and required vasopressors.      24HOUR EVENTS  5/13  NIGHT  - OR ABG 7.21/43/77/17, lactate 1.2  - remaining on vent post-op  - on vaso @0.03 and levophed    [X] A ten-point review of systems was negative except as expressed in note.  [X] History was obtained from patient. If unable to participate in their care, history obtained from review of the chart and collateral sources of information.  ============================================================   Daily Height in cm: 167.6 (14 May 2025 00:10)    Daily   Diet, NPO (05-13-25 @ 22:39)    CURRENT MEDS:  Neurologic Medications  acetaminophen   IVPB .. 1000 milliGRAM(s) IV Intermittent once  dexMEDEtomidine Infusion 0.2 MICROgram(s)/kG/Hr IV Continuous <Continuous>  fentaNYL    Injectable 12.5 MICROGram(s) IV Push every 4 hours PRN Moderate Pain (4 - 6)  fentaNYL    Injectable 25 MICROGram(s) IV Push every 4 hours PRN Severe Pain (7 - 10)    Respiratory Medications  albuterol/ipratropium for Nebulization 3 milliLiter(s) Nebulizer every 6 hours    Cardiovascular Medications  norepinephrine Infusion 0.05 MICROgram(s)/kG/Min IV Continuous <Continuous>    Gastrointestinal Medications  pantoprazole  Injectable 40 milliGRAM(s) IV Push every 12 hours  sodium bicarbonate  Infusion 0.057 mEq/kG/Hr IV Continuous <Continuous>    Genitourinary Medications    Hematologic/Oncologic Medications  heparin   Injectable 5000 Unit(s) SubCutaneous every 8 hours    Antimicrobial/Immunologic Medications  caspofungin IVPB 50 milliGRAM(s) IV Intermittent every 24 hours  caspofungin IVPB      piperacillin/tazobactam IVPB.. 3.375 Gram(s) IV Intermittent every 12 hours    Endocrine/Metabolic Medications    Topical/Other Medications  chlorhexidine 0.12% Liquid 15 milliLiter(s) Oral Mucosa every 12 hours  chlorhexidine 2% Cloths 1 Application(s) Topical <User Schedule>    ICU Vital Signs Last 24 Hrs  T(C): 36.8 (14 May 2025 00:10), Max: 36.8 (13 May 2025 15:21)  T(F): 98.3 (13 May 2025 15:21), Max: 98.3 (13 May 2025 15:21)  HR: 77 (14 May 2025 03:15) (76 - 93)  BP: 102/55 (14 May 2025 00:10) (79/45 - 114/59)  BP(mean): 72 (14 May 2025 00:10) (8 - 78)  ABP: --  ABP(mean): --  RR: 20 (14 May 2025 00:10) (20 - 24)  SpO2: 99% (14 May 2025 03:15) (86% - 100%)    O2 Parameters below as of 14 May 2025 00:10    O2 Flow (L/min): 10  O2 Concentration (%): 50      Mode: AC/ CMV (Assist Control/ Continuous Mandatory Ventilation)  RR (machine): 12  TV (machine): 400  FiO2: 80  PEEP: 10  ITime: 1  MAP: 13  PIP: 25    ABG - ( 14 May 2025 06:58 )  pH, Arterial: 7.16  pH, Blood: x     /  pCO2: 44    /  pO2: 197   / HCO3: 16    / Base Excess: -12.3 /  SaO2: 99.2      I&O's Summary    I&O's Detail    PHYSICAL EXAM:     GCS 11T, follows commands, currently on precedex  no acute distress  equal chest rise b/l, b/l rhonchi  abdomen mildly firm, lower abdomen distended, mildly tender, midline abthera in place  extremities soft  urinary cath in place

## 2025-05-14 NOTE — CONSULT NOTE ADULT - ASSESSMENT
80 yo female with extensive PMH presenting for perforated duodenal ulcer     # [facility] most likely ATN sp perforated duodenal ulcer sp OR   # acidosis  # VDRF   # sepsis     - agree with bicarb drip / keep until bicarb > 20 on BMO  - atrophic right kidney on CT scan / no hydro  - check Ua and U lytes   - MV as per primary team   - antibx as per ID dose to GFR < 30 ml/min  - no need for RRT yet     renal team will follow closely

## 2025-05-14 NOTE — PROCEDURE NOTE - NSICDXPROCEDURE_GEN_ALL_CORE_FT
PROCEDURES:  US Doppler guided insertion of central venous catheter 14-May-2025 14:54:39  Flor Alvarez

## 2025-05-14 NOTE — CONSULT NOTE ADULT - SUBJECTIVE AND OBJECTIVE BOX
HPI:  Pt is 81F PMHx HTN, a-fib (on xarelto), solitary kidney, DM, GERD, prior open cholecystectomy c/b suture granulomas s/p 2019 abdominal wall debridements by Dr. Banda, respiratory distress with prolonged ventilation during 11/2024 admission, s/p 11/19 tracheostomy and gastrostomy and feeding jejunostomy tube by Dr. Champion, ya catheter dependence who presented to the ED from NH on 5/13 with 4 days progressively worsening abdominal pain. Surgery consulted for imaging concerning for duodenal perforation. History limited by pt's minimally-verbal status. Spoke with daughter on phone, pt is full code at this time, (13 May 2025 21:02)    Patient found to have perforated peptic ulcer s/p ex-lap.   Presently intubated.       ITEMS NOT CHECKED ARE NOT PRESENT    SOCIAL HISTORY:   Significant other/partner[ ]  Children[x ]  Nondenominational/Spirituality:  Substance hx:  [ ]   Tobacco hx:  [ ]   Alcohol hx: [ ]   Living Situation: [ ]Home  [x ]Long term care  [ ]Rehab [ ]Other  Home Services: [ ] HHA [ ] Visting RN [ ] Hospice  Occupation:  Home Opioid hx:  [ ] Y [ ] N   I-Stop Reference No:     ADVANCE DIRECTIVES:    [ ] Full Code [ ] DNR  MOLST  [ ]  Living Will  [ ]   DECISION MAKER(s):  [ ] Health Care Proxy(s)  [ ] Surrogate(s)  [ ] Guardian           Name(s): Phone Number(s):    BASELINE (I)ADL(s) (prior to admission):  [place]: [ ]Total  [ ] Moderate [ ]Dependent      Allergies    No Known Allergies    Intolerances    MEDICATIONS  (STANDING):  acetaminophen   IVPB .. 1000 milliGRAM(s) IV Intermittent once  albuterol    90 MICROgram(s) HFA Inhaler 2 Puff(s) Inhalation every 4 hours  caspofungin IVPB 50 milliGRAM(s) IV Intermittent every 24 hours  caspofungin IVPB      chlorhexidine 0.12% Liquid 15 milliLiter(s) Oral Mucosa every 12 hours  chlorhexidine 2% Cloths 1 Application(s) Topical <User Schedule>  chlorhexidine 4% Liquid 1 Application(s) Topical <User Schedule>  dexMEDEtomidine Infusion 0.2 MICROgram(s)/kG/Hr (4.35 mL/Hr) IV Continuous <Continuous>  heparin   Injectable 5000 Unit(s) SubCutaneous every 8 hours  norepinephrine Infusion 0.05 MICROgram(s)/kG/Min (8.16 mL/Hr) IV Continuous <Continuous>  norepinephrine Infusion 0.05 MICROgram(s)/kG/Min (4.08 mL/Hr) IV Continuous <Continuous>  pantoprazole  Injectable 40 milliGRAM(s) IV Push every 12 hours  piperacillin/tazobactam IVPB.. 3.375 Gram(s) IV Intermittent every 12 hours  sodium bicarbonate  Infusion 0.086 mEq/kG/Hr (50 mL/Hr) IV Continuous <Continuous>  vasopressin Infusion. 0.03 Unit(s)/Min (4.5 mL/Hr) IV Continuous <Continuous>    MEDICATIONS  (PRN):  fentaNYL    Injectable 12.5 MICROGram(s) IV Push every 4 hours PRN Moderate Pain (4 - 6)  fentaNYL    Injectable 25 MICROGram(s) IV Push every 4 hours PRN Severe Pain (7 - 10)  sodium chloride 0.9% lock flush 10 milliLiter(s) IV Push every 1 hour PRN Pre/post blood products, medications, blood draw, and to maintain line patency      PRESENT SYMPTOMS: [x ]Unable to obtain due to poor mentation   Source if other than patient:  [ ]Family   [ ]Team     Pain: [ ]yes [ ]no  QOL impact -   Location -                    Aggravating factors -  Alleviating factors -   Quality -  Radiation -  Timing -   Severity (0-10 scale):  Minimal acceptable level (0-10 scale):     CPOT: 0   https://[email or web address]/getattachment/[email or web address]/Critical-Care-Pain-Observation-Tool-(CPOT)    PAIN AD Score:   http://geriatrictoolkit.[email or web address]/cog/painad.pdf     Dyspnea:                           [ ]None[ ]Mild [ ]Moderate [ ]Severe     Respiratory Distress Observation Scale (RDOS): 0  A score of 0 to 2 signifies little or no respiratory distress, 3 signifies mild distress, scores 4 to 6 indicate moderate distress, and scores greater than 7 signify severe distress  [email or web address]/sites/default/files/PDFS/342137-elaujdnqztc-pcgsshfc-pvmlvqixdvh-bufcj.pdf    Anxiety:                             [ ]None[ ]Mild [ ]Moderate [ ]Severe   Fatigue:                             [ ]None[ ]Mild [ ]Moderate [ ]Severe   Nausea:                             [ ]None[ ]Mild [ ]Moderate [ ]Severe   Loss of appetite:              [ ]None[ ]Mild [ ]Moderate [ ]Severe   Constipation:                    [ ]None[ ]Mild [ ]Moderate [ ]Severe    Other Symptoms:  [ ]All other review of systems negative     PHYSICAL EXAM:    GENERAL:  NAD   PULMONARY:  MV  NEUROLOGIC: intubated/sedated  BEHAVIORAL/PSYCH:  Calm    Palliative Performance Status Version 2:      10   %    http://[email or web address]/files/news/palliative_performance_scale_ppsv2.pdf    LABS: I have reviewed daily labs, including                          9.6    22.35 )-----------( 363      ( 14 May 2025 04:10 )             31.4       05-14    133[L]  |  102  |  51[H]  ----------------------------<  136[H]  4.6   |  18  |  2.4[H]    Ca    8.4      14 May 2025 04:10  Phos  4.2     05-14  Mg     2.0     05-14    TPro  4.4[L]  /  Alb  2.4[L]  /  TBili  0.5  /  DBili  0.3  /  AST  35  /  ALT  18  /  AlkPhos  114  05-14          RADIOLOGY & ADDITIONAL STUDIES: I have independently reviewed new imaging, including    CXR 5/14: Post right IJ central venous catheter with tip near region of SVC. . No pneumothorax.    PROTEIN CALORIE MALNUTRITION PRESENT: [ ]mild [ ]moderate [ ]severe [ ]underweight [ ]morbid obesity  https://www.[facility].org/vault/4130/web/files/ONC/Table_Clinical%20Characteristics%20to%20Document%20Malnutrition-White%20JV%20et%20al%202012.pdf    Height (cm): 167.6 (05-14-25 @ 00:10), 167.6 (04-11-25 @ 13:47), 167.6 (11-20-24 @ 20:17)  Weight (kg): 87 (05-14-25 @ 00:10), 82.1 (04-14-25 @ 09:41), 90.2 (11-20-24 @ 20:17)  BMI (kg/m2): 31 (05-14-25 @ 00:10), 29.2 (04-14-25 @ 09:41), 32.1 (04-11-25 @ 13:47)    [ ]PPSV2 < or = to 30% [ ]significant weight loss  [ ]poor nutritional intake  [ ]anasarca      [ ]Artificial Nutrition      Palliative Care Spiritual/Emotional Screening Tool Question  Severity (0-4):                    OR                    [ x] Unable to determine. Will assess at later time if appropriate.  Score of 2 or greater indicates recommendation of Chaplaincy and/or SW referral  Chaplaincy Referral: [ ] Yes [ ] Refused [ ] Following     Caregiver [place]:  [ ] Yes [ ] No    OR    [x ] Unable to determine. Will assess at later time if appropriate.  Social Work Referral [ ]  Patient and Family Centered Care Referral [ ]    Anticipatory Grief Present: [ ] Yes [ ] No    OR     [ x] Unable to determine. Will assess at later time if appropriate.  Social Work Referral [ ]  Patient and Family Centered Care Referral [ ]    REFERRALS:   [ ]Chaplaincy  [ ]Hospice  [ ]Child Life  [ ]Social Work  [ ]Case management [ ]Holistic Therapy     Palliative care education provided to patient and/or family

## 2025-05-14 NOTE — CHART NOTE - NSCHARTNOTEFT_GEN_A_CORE
PACU ANESTHESIA ADMISSION NOTE    Procedure:   Post op diagnosis:      ____  Intubated  TV:__400____       Rate: ___12___      FiO2: __80____ PEEP 10   __x__  Patent Airway  __x__  Full return of protective reflexes  _x___  Full recovery from anesthesia / back to baseline     Vitals:       Sat:    98                 BP:         127/76           R:          12  P: 98  T:     36        Mental Status:    ___ Awake    ____ Alert     ____ Drowsy    ____x_ Sedated    Nausea/Vomiting:   __x__ NO    ____ Yes,   See Post - Op Orders          Pain Scale (0-10):    ____ Treatment:   _x___ None      ____ See Post - Op/PCA Orders    Post - Operative Fluids:    ____ Oral     __x__ See Post - Op Orders    Plan: Discharge:     ____Home         _____Floor       __x___Critical Care      _____  Other:_________________    Comments: CVC placed by surgeon at the end of the case, no complications, pt transferred to ICU on monitors, report given to SICU team.

## 2025-05-14 NOTE — PROGRESS NOTE ADULT - ASSESSMENT
74F found to have perforated peptic ulcer now s/p ex lap, distal antrectomy, abthera placement requiring vasopressors.    NEUROLOGICAL:  #sedation  - precedex infusion, RASS -2 (open abdomen)  #Acute pain    -IV APAP prn    -fentanyl 12.5mcg prn moderate pain   - fentanyl 25mcg prn severe pain    RESPIRATORY:   #Oxygenation, s/p previous tracheostomy (11/24)    - now with 8 portex cuffed trach     - currently on mechanical ventilation: 400/12/80/10    - continue duonebs     - f/u CXR and ABG  #Decreased small bibasilar opacities/atelectasis, right greater than left on CT  #Activity    -bedrest given open abdomen     CARDS:   #hypotension 2/2 sepsis   - currently on levophed gtt and vasopressin @0.03  - MAP >65  #hx afib  - holding home Xarelto 15 QD (s/p PCC in OR)  #hx HTN  - holding home losartan 100QD  #elevated troponin  - 143 > 84   - no longer trending  Imaging:   #post-op EKG pending   #TTE 11/24: EF 71%. Mild-mod AS. Trivial pericardial effusion.   - repeat TTE pending     GASTROINTESTINAL/NUTRITION:   #perforated gastric ulcer   - s/p OR 5/14 > ex lap, distal antrectomy, D1 stapled off, abthera placement   - plan to RTOR later today for closure   - strict NPO/NGT to LCWS  - continue zosyn and caspo  #Diet, Strict NPO, G tube to gravity     -aspiration precautions, HOB 30    -hx of GJ placement (11/24)   #GI Prophylaxis    - protonix 40bid   #Bowel regimen    -holding    -last bowel movement prior to admission    /RENAL:   #urine output in critically ill    -chronic indwelling ya  #metabolic acidosis  #NOLA (baseline creatine 0.6)  - f/u urine lytes    - NS @100cc/hr  - nephrology consulted   #hx congential solitary kidney     Labs          BUN/Cr: 72/3.9 -->           [05-13 @ 15:45]Na  134 // K  4.8 // Mg  -- // Phos  --         HEME/ONC:   #DVT prophylaxis     -Chemical: SQH      -Mechanical: SCDs       Labs: Hb/Hct:  11.5/35.4  (05-13 @ 15:45)  -->,  11.1/33.8  (05-13 @ 19:55)  -->                      Plts:  332  -->,  180  -->                 PTT/INR:  31.3/1.01  --->       ID:  #perforated duodenal ulcer  - zosyn (renal dosing) and caspo per primary team   WBC- 18.74  --->>,  15.53  --->>  Temp trend- 24hrs T(F): 98.3 (05-13 @ 15:21), Max: 98.3 (05-13 @ 15:21)  #MRSA nares pending   #COVID positive on admission  #Cultures  - blood cx sent on admission    ENDOCRINE:  #glycemic monitoring    -FSG q6 if NPO    -Glucose goal 140-180. If above 180, will start corrective insulin sliding scale  #hypercalcemia  - holding home cinacalcet 30mg bid while NPO    MSK:  #Activity - bedrest given open abdomen/abthera   #hx osteoporosis  - holding home Alendronate 70mg weekly on Thursdays while NPO    SKIN:  #DTI screening negative 5/14    - will continue to monitor daily skin changes      PALLIATIVE:  Currently full code, palliative consult pending       LINES/DRAINS:  PIV, Ya (chronic), GJ in place (chronic), 8 cuffed portex trach, L radial arterial line (5/14- ), LIJ CVC (5/14- )  ADVANCED DIRECTIVES:  Full Code    HCP/Emergency Contact- pérez Brooks [phone]  INDICATION FOR SICU/SDU:  perforated peptic ulcer; mechanical ventilation/vasopressors   DISPO:  SICU. Case discussed with attending Dr. Pena   74F found to have perforated peptic ulcer now s/p ex lap, distal antrectomy, abthera placement requiring vasopressors.    NEUROLOGICAL:  #sedation  - precedex infusion, RASS -2 (open abdomen)  #Acute pain    -IV APAP prn    -fentanyl 12.5mcg prn moderate pain   - fentanyl 25mcg prn severe pain    RESPIRATORY:   #Oxygenation, s/p previous tracheostomy (11/24)    - now with 8 portex cuffed trach     - currently on mechanical ventilation: 400/12/80/10    - continue duonebs   #Decreased small bibasilar opacities/atelectasis, right greater than left on CT  #Activity    -bedrest given open abdomen     CARDS:   #hypotension 2/2 sepsis   - currently on levophed gtt and vasopressin @0.03  - MAP >65  #hx afib  - holding home Xarelto 15 QD (s/p PCC in OR)  #hx HTN  - holding home losartan 100QD  #elevated troponin  - 143 > 84   - no longer trending  Imaging:   #TTE 11/24: EF 71%. Mild-mod AS. Trivial pericardial effusion.   - repeat TTE pending     GASTROINTESTINAL/NUTRITION:   #perforated prepyloric gastric ulcer s/p exploratory laparotomy antrectomy and D1 stapled off with temporary abdominal closure and abthera in place  - plan to RTOR later today for closure   - strict NPO/NGT to LCWS  - continue zosyn and caspo  #Diet, Strict NPO, G tube to gravity     -aspiration precautions, HOB 30    -hx of GJ placement (11/24)   #GI Prophylaxis    - protonix 40bid IV  #Bowel regimen    -holding    -last bowel movement prior to admission    /RENAL:   #urine output in critically ill    -chronic indwelling ya  #metabolic acidosis  #NOLA (baseline creatine 0.6)  - NS @100cc/hr  - nephrology consulted   #hx congential solitary kidney     Labs:          BUN/Cr- 72/3.9  -->,  51/2.4  -->          [05-14 @ 04:10]Na  133 // K  4.6 // Mg  2.0 // Phos  4.2       HEME/ONC:   #DVT prophylaxis    -heparin   Injectable  , SCDs    Labs: Hb/Hct:  11.1/33.8  -->,  9.6/31.4  -->                      Plts:  180  -->,  363  -->                 PTT/INR:  31.3/1.01  --->,  24.7/0.99  --->     T&S Expires: 5/16    ID:  #perforated duodenal ulcer  - zosyn (renal dosing) (5/14 - )  - caspofungin per primary team (5/14 - )  WBC- 18.74  --->>,  15.53  --->>,  22.35  --->>  Temp trend- 24hrs T(F): 98.3 (05-13 @ 15:21), Max: 98.3 (05-13 @ 15:21)  #MRSA pending   #COVID positive on admission  #Cultures    - 5/13: blood culture: pending    - 5/13: blood culture: pending    - 5/13: urine culture: pending    ENDOCRINE:  #glycemic monitoring    -FSG q6 if NPO    -Glucose goal 140-180. If above 180, will start corrective insulin sliding scale  #hypercalcemia  - holding home cinacalcet 30mg bid while NPO    MSK:  #Activity - bedrest given open abdomen/abthera   #hx osteoporosis  - holding home Alendronate 70mg weekly on Thursdays while NPO    SKIN:  #DTI screening negative 5/14    - will continue to monitor daily skin changes      PALLIATIVE:  Currently full code, palliative consult pending       LINES/DRAINS:  PIV, Ya (chronic), GJ in place (chronic), 8 cuffed portex trach, L radial arterial line (5/14- ), LIJ CVC (5/14- )  ADVANCED DIRECTIVES:  Full Code    HCP/Emergency Contact- daughter Cecilia [phone]  INDICATION FOR SICU/SDU:  perforated peptic ulcer; mechanical ventilation/vasopressors   DISPO:  SICU. Case to be discussed with attending Dr. Pena 74F found to have perforated peptic ulcer now s/p ex lap, distal antrectomy, abthera placement requiring vasopressors.    NEUROLOGICAL:  #sedation  - precedex infusion, RASS -2 (open abdomen)  #Acute pain    -IV APAP prn    -fentanyl 12.5mcg prn moderate pain   - fentanyl 25mcg prn severe pain    RESPIRATORY:   #Oxygenation, s/p previous tracheostomy (11/24)    - now with 8 portex cuffed trach     - currently on mechanical ventilation: 400/12/80/10  ABG - ( 14 May 2025 06:58 )  pH, Arterial: 7.16  pH, Blood: x     /  pCO2: 44    /  pO2: 197   / HCO3: 16    / Base Excess: -12.3 /  SaO2: 99.2      - continue duonebs   #Decreased small bibasilar opacities/atelectasis, right greater than left on CT  #Activity    -bedrest given open abdomen     CARDS:   #hypotension 2/2 sepsis   - currently on levophed gtt and vasopressin @0.03  - MAP >65  #hx afib  - holding home Xarelto 15 QD (s/p PCC in OR)  #hx HTN  - holding home losartan 100QD  #elevated troponin  - 143 > 84   - no longer trending  Imaging:   #TTE 11/24: EF 71%. Mild-mod AS. Trivial pericardial effusion.   - repeat TTE pending     GASTROINTESTINAL/NUTRITION:   #perforated prepyloric gastric ulcer s/p exploratory laparotomy antrectomy and D1 stapled off with temporary abdominal closure and abthera in place  - plan to RTOR for closure on 5/15  - strict NPO/NGT to LCWS  - continue zosyn and caspo  #Diet, Strict NPO, G tube to gravity     -aspiration precautions, HOB 30    -hx of GJ placement (11/24)   #GI Prophylaxis    - protonix 40bid IV  #Bowel regimen    -holding    -last bowel movement prior to admission    /RENAL:   #urine output in critically ill    -chronic indwelling ya  #metabolic acidosis    - sodium bicarbonate infusion @ 100cc/hr  #NOLA (baseline creatine 0.6)  - nephrology consulted   #hx congential solitary kidney     Labs:          BUN/Cr- 72/3.9  -->,  51/2.4  -->          [05-14 @ 04:10]Na  133 // K  4.6 // Mg  2.0 // Phos  4.2       HEME/ONC:   #DVT prophylaxis    -heparin   Injectable  , SCDs    Labs: Hb/Hct:  11.1/33.8  -->,  9.6/31.4  -->                      Plts:  180  -->,  363  -->                 PTT/INR:  31.3/1.01  --->,  24.7/0.99  --->     T&S Expires: 5/16    ID:  #perforated duodenal ulcer  - zosyn (renal dosing) (5/14 - )  - caspofungin per primary team (5/14 - )  WBC- 18.74  --->>,  15.53  --->>,  22.35  --->>  Temp trend- 24hrs T(F): 98.3 (05-13 @ 15:21), Max: 98.3 (05-13 @ 15:21)  #MRSA pending   #COVID positive on admission  #Cultures    - 5/13: blood culture: pending    - 5/13: blood culture: pending    - 5/13: urine culture: pending    ENDOCRINE:  #glycemic monitoring    -FSG q6 if NPO    -Glucose goal 140-180. If above 180, will start corrective insulin sliding scale  #hypercalcemia  - holding home cinacalcet 30mg bid while NPO    MSK:  #Activity - bedrest given open abdomen/abthera   #hx osteoporosis  - holding home Alendronate 70mg weekly on Thursdays while NPO    SKIN:  #DTI screening negative 5/14    - will continue to monitor daily skin changes      PALLIATIVE:  Currently full code, palliative consult pending       LINES/DRAINS:  PIV, Ya (chronic), GJ in place (chronic), 8 cuffed portex trach, L radial arterial line (5/14- ), LIJ CVC (5/14- )  ADVANCED DIRECTIVES:  Full Code    HCP/Emergency Contact- daughter Cecilia [phone]  INDICATION FOR SICU/SDU:  perforated peptic ulcer; mechanical ventilation/vasopressors   DISPO:  SICU. Case to be discussed with attending Dr. Pena

## 2025-05-14 NOTE — CONSULT NOTE ADULT - SUBJECTIVE AND OBJECTIVE BOX
PATRICIA SPRAGUE  74y, Female  Allergy: No Known Allergies      CHIEF COMPLAINT:       LOS  1d    HPI  HPI:  Pt is 81F PMHx HTN, a-fib (on xarelto), solitary kidney, DM, GERD, prior open cholecystectomy c/b suture granulomas s/p 2019 abdominal wall debridements by Dr. Banda, respiratory distress with prolonged ventilation during 11/2024 admission, s/p 11/19 tracheostomy and gastrostomy and feeding jejunostomy tube by Dr. Champion, ya catheter dependence who presented to the ED from NH on 5/13 with 4 days progressively worsening abdominal pain. Surgery consulted for imaging concerning for duodenal perforation. History limited by pt's minimally-verbal status. Spoke with daughter on phone, pt is full code at this time, (13 May 2025 21:02)      INFECTIOUS DISEASE HISTORY:  ID consulted for perforated duodenal ulcer, s/p OR  Afebrile; Admission WBC 18  Procedure	PROCEDURES:  Partial antrectomy 14-May-2025 03:37:14  Carlos Cheatham  Operative Findings	prepyloric ulcer 4 cm perforation anterior stomach s/p antrectomy, D1 stapled off due to friable tissue to ensure healthy tissue for staple line. Temporary abdominal closure due to HD instability.  < from: CT Abdomen and Pelvis w/ IV Cont (05.13.25 @ 18:16) >  *1. Since November 8, 2024, new moderate mural thickening and inflammation involving the proximal proximal duodenal wall, with focal   defect/outpouching along lateral duodenum, compatible with perforated duodenal ulcer; no focal drainable fluid collection.  2. Additional chronic/incidental findings, as detailed above.    Pt also tested  + COVID19    Currently ordered for:  caspofungin IVPB 50 milliGRAM(s) IV Intermittent every 24 hours  caspofungin IVPB      piperacillin/tazobactam IVPB.. 3.375 Gram(s) IV Intermittent every 12 hours      PMH  PAST MEDICAL & SURGICAL HISTORY:  HTN (hypertension)      Diabetes mellitus      Obesity      Gastroesophageal reflux disease      Active asthma      Generalized OA      Afib      H/O hernia repair  2011 and revised in 2013      History of cholecystectomy      Status post debridement      H/O tracheostomy      S/P gastrostomy      S/P jejunostomy          FAMILY HISTORY  No pertinent family history in first degree relatives        SOCIAL HISTORY  Social History:        ROS  ***    VITALS:  T(F): 96.6, Max: 98.3 (05-13-25 @ 15:21)  HR: 80  BP: 113/47  RR: 21Vital Signs Last 24 Hrs  T(C): 35.9 (14 May 2025 08:00), Max: 36.8 (13 May 2025 15:21)  T(F): 96.6 (14 May 2025 08:00), Max: 98.3 (13 May 2025 15:21)  HR: 80 (14 May 2025 09:00) (71 - 103)  BP: 113/47 (14 May 2025 09:00) (79/45 - 126/56)  BP(mean): 68 (14 May 2025 09:00) (8 - 93)  RR: 21 (14 May 2025 09:00) (12 - 24)  SpO2: 100% (14 May 2025 09:00) (86% - 100%)    Parameters below as of 14 May 2025 09:00  Patient On (Oxygen Delivery Method): ventilator    O2 Concentration (%): 80    PHYSICAL EXAM:  ***    TESTS & MEASUREMENTS:                        9.6    22.35 )-----------( 363      ( 14 May 2025 04:10 )             31.4     05-14    133[L]  |  102  |  51[H]  ----------------------------<  136[H]  4.6   |  18  |  2.4[H]    Ca    8.4      14 May 2025 04:10  Phos  4.2     05-14  Mg     2.0     05-14    TPro  4.4[L]  /  Alb  2.4[L]  /  TBili  0.5  /  DBili  0.3  /  AST  35  /  ALT  18  /  AlkPhos  114  05-14      LIVER FUNCTIONS - ( 14 May 2025 04:10 )  Alb: 2.4 g/dL / Pro: 4.4 g/dL / ALK PHOS: 114 U/L / ALT: 18 U/L / AST: 35 U/L / GGT: x           Urinalysis Basic - ( 14 May 2025 04:10 )    Color: x / Appearance: x / SG: x / pH: x  Gluc: 136 mg/dL / Ketone: x  / Bili: x / Urobili: x   Blood: x / Protein: x / Nitrite: x   Leuk Esterase: x / RBC: x / WBC x   Sq Epi: x / Non Sq Epi: x / Bacteria: x        Urinalysis with Rflx Culture (collected 05-13-25 @ 17:22)    Culture - Stool (collected 11-23-24 @ 14:11)  Source: .Stool  Final Report (11-25-24 @ 22:02):    No enteric pathogens isolated.    (Stool culture examined for Salmonella,    Shigella, Campylobacter, Aeromonas, Plesiomonas,    Vibrio, E.coli O157 and Yersinia)    Culture - Acid Fast - Bronchial w/Smear (collected 11-20-24 @ 12:00)  Source: Bronchial  Final Report (01-04-25 @ 23:03):    No acid-fast bacilli isolated after 6 weeks.    Culture - Fungal, Bronchial (collected 11-20-24 @ 12:00)  Source: Bronchial  Final Report (12-18-24 @ 23:01):    No fungus isolated at 4 weeks.    Culture - Bronchial (collected 11-20-24 @ 12:00)  Source: Bronchial  Gram Stain (11-20-24 @ 23:18):    Moderate polymorphonuclear leukocytes per low power field    No squamous epithelial cells    Few Gram Positive Cocci in Clusters per oil power field  Final Report (11-22-24 @ 09:09):    Commensal rosalva consistent with body site    Culture - Acid Fast - Bronchial w/Smear (collected 11-19-24 @ 11:25)  Source: Bronchial  Final Report (01-04-25 @ 23:03):    No acid-fast bacilli isolated after 6 weeks.    Culture - Fungal, Bronchial (collected 11-19-24 @ 11:25)  Source: Bronchial  Final Report (12-18-24 @ 23:01):    No fungus isolated at 4 weeks.    Culture - Bronchial (collected 11-19-24 @ 11:25)  Source: Bronch Wash  Gram Stain (11-19-24 @ 23:26):    Rare polymorphonuclear leukocytes per low power field    No Squamous epithelial cells per low power field    No organisms seen per oil power field  Final Report (11-21-24 @ 08:25):    Commensal rosalva consistent with body site    Culture - Viral, General (collected 11-12-24 @ 09:01)    Culture - Bronchial (collected 11-12-24 @ 09:01)  Source: Bronch Wash  Gram Stain (11-12-24 @ 20:33):    No Squamous epithelial cells seen per low power field    Rare polymorphonuclear leukocytes seen per low power field    No organisms seen per oil power field  Final Report (11-14-24 @ 09:38):    Commensal rosalva consistent with body site    Culture - Acid Fast - Bronchial w/Smear (collected 11-12-24 @ 09:01)  Source: Bronch Wash  Final Report (12-28-24 @ 23:03):    No acid-fast bacilli isolated after 6 weeks.    Culture - Fungal, Bronchial (collected 11-12-24 @ 09:01)  Source: Bronchial  Final Report (12-11-24 @ 23:01):    No fungus isolated at 4 weeks.    Culture - Sputum (collected 11-08-24 @ 12:36)  Source: Trach Asp Tracheal Aspirate  Gram Stain (11-09-24 @ 07:34):    Few polymorphonuclear leukocytes per low power field    No Squamous epithelial cells per low power field    Rare Gram positive cocci in pairs seen per oil power field    Rare Gram Negative Rods seen per oil power field  Final Report (11-10-24 @ 09:45):    No growth    Urinalysis with Rflx Culture (collected 11-03-24 @ 13:51)    Culture - Stool (collected 11-03-24 @ 13:51)  Source: .Stool  Final Report (11-05-24 @ 19:02):    No enteric pathogens isolated.    (Stool culture examined for Salmonella,    Shigella, Campylobacter, Aeromonas, Plesiomonas,    Vibrio, E.coli O157 and Yersinia)    Culture - Urine (collected 11-03-24 @ 13:51)  Source: Clean Catch None  Final Report (11-05-24 @ 12:11):    >100,000 CFU/ml Streptococcus gallolyticus "Susceptibilities not    performed"    <10,000 CFU/ml Normal Urogenital rosalva present    Culture - Blood (collected 11-03-24 @ 11:10)  Source: .Blood BLOOD  Final Report (11-08-24 @ 18:00):    No growth at 5 days    Culture - Blood (collected 11-03-24 @ 11:10)  Source: .Blood BLOOD  Final Report (11-08-24 @ 18:00):    No growth at 5 days        Blood Gas Venous - Lactate: 1.5 mmol/L (05-14-25 @ 09:24)  Lactate, Blood: 1.1 mmol/L (05-13-25 @ 19:55)  Blood Gas Venous - Lactate: 1.3 mmol/L (05-13-25 @ 15:59)  Lactate, Blood: 1.2 mmol/L (05-13-25 @ 15:45)      INFECTIOUS DISEASES TESTING  MRSA PCR Result.: Negative (05-14-25 @ 07:20)  MRSA PCR Result.: Negative (04-14-25 @ 17:16)  Procalcitonin: 5.22 ng/mL (11-05-24 @ 19:32)  MRSA PCR Result.: Negative (11-04-24 @ 13:15)      INFLAMMATORY MARKERS      RADIOLOGY & ADDITIONAL TESTS:  I have personally reviewed the last Chest xray  CXR  Xray Chest 1 View- PORTABLE-Urgent:   ACC: [id number] EXAM:  XR CHEST PORTABLE URGENT 1V   ORDERED BY: ROSEANN MCNEIL     PROCEDURE DATE:  05/13/2025          INTERPRETATION:  CLINICAL HISTORY: Shortness of breath    COMPARISON: December 1, 2024.    TECHNIQUE: Portable frontal chest radiograph.    FINDINGS:    Support devices: Telemetry leads overlie the chest. Tracheostomy catheter   is seen.    Cardiac/mediastinum/hilum: Stable cardiomegaly.    Lung parenchyma/Pleura: No focal parenchymal opacities, pleural   effusions, or pneumothorax.    Skeleton/soft tissues: Stable.      IMPRESSION:    No radiographic evidence of acute cardiopulmonary disease. Support   devices as described.    --- End of Report ---            MARISEL VACA MD; Attending Interventional Radiologist  This document has been electronically signed. May 14 2025  7:43AM (05-13-25 @ 18:19)      CT  CT Abdomen and Pelvis w/ IV Cont:   ACC: [id number] EXAM:  CT ABDOMEN AND PELVIS IC   ORDERED BY: ROSEANN MCNEIL     PROCEDURE DATE:  05/13/2025          INTERPRETATION:  CLINICAL INFORMATION: Abdominal pain.    COMPARISON: November 8, 2024.    CONTRAST/COMPLICATIONS:  IV Contrast:Omnipaque 350  100 cc administered   0 cc discarded  Oral Contrast: NONE    PROCEDURE:  CT of the Abdomen and Pelvis was performed.  Sagittal and coronal reformats were performed.    FINDINGS:  LOWER CHEST: Decreased small bibasilar opacities/atelectasis, right   greater than left.    LIVER: Within normal limits.  BILE DUCTS: Normal caliber.  GALLBLADDER: Status post cholecystectomy.  SPLEEN: Within normal limits.  PANCREAS: Within normal limits.  ADRENALS: Unchanged bilateral adrenal gland nodules, left greater than   right.  KIDNEYS/URETERS: Atrophic right kidney. No hydronephrosis.    BLADDER: Urinary bladder is decompressed with Ya catheter balloon in   place.  REPRODUCTIVE ORGANS: Calcified uterine fibroid.    BOWEL: New moderate mural thickening and inflammation involving the   proximal duodenum, with focal defect/outpouching along lateral duodenal   wall, compatible with perforated duodenal ulcer (4/131.) Distal aspect of   a gastrojejunostomy tube is coiled within the stomach. No evidence of   bowel obstruction.  PERITONEUM/RETROPERITONEUM: No focal drainable fluid collection.  VESSELS: Scattered atherosclerotic vascular calcification.  LYMPH NODES: No enlarged abdominal or pelvic lymph nodes.  ABDOMINAL WALL: Within normallimits.  BONES: Degenerative changes of the spine. No acute osseous abnormality.    IMPRESSION:    *1. Since November 8, 2024, new moderate mural thickening and   inflammation involving the proximal proximal duodenal wall, with focal   defect/outpouching along lateral duodenum, compatible with perforated   duodenal ulcer; no focal drainable fluid collection.  2. Additional chronic/incidental findings, as detailed above.      *Spoke with DR. BURNS of the ED on 5/13/2025 7:25 PM with readback.    --- End of Report ---            JTE WALDEN MD; Attending Radiologist  This document has been electronically signed. May 13 2025  7:43PM (05-13-25 @ 18:16)      CARDIOLOGY TESTING       MEDICATIONS  acetaminophen   IVPB .. 1000 IV Intermittent once  albuterol    90 MICROgram(s) HFA Inhaler 2 Inhalation every 4 hours  caspofungin IVPB 50 IV Intermittent every 24 hours  caspofungin IVPB     chlorhexidine 0.12% Liquid 15 Oral Mucosa every 12 hours  chlorhexidine 2% Cloths 1 Topical <User Schedule>  dexMEDEtomidine Infusion 0.2 IV Continuous <Continuous>  heparin   Injectable 5000 SubCutaneous every 8 hours  norepinephrine Infusion 0.05 IV Continuous <Continuous>  pantoprazole  Injectable 40 IV Push every 12 hours  piperacillin/tazobactam IVPB.. 3.375 IV Intermittent every 12 hours  sodium bicarbonate  Infusion 0.172 IV Continuous <Continuous>      ANTIBIOTICS:  caspofungin IVPB 50 milliGRAM(s) IV Intermittent every 24 hours  caspofungin IVPB      piperacillin/tazobactam IVPB.. 3.375 Gram(s) IV Intermittent every 12 hours      ALLERGIES:  No Known Allergies         PATRICIA SPRAGUE  74y, Female  Allergy: No Known Allergies      CHIEF COMPLAINT:       LOS  1d    HPI  HPI:  Pt is 81F PMHx HTN, a-fib (on xarelto), solitary kidney, DM, GERD, prior open cholecystectomy c/b suture granulomas s/p 2019 abdominal wall debridements by Dr. Banda, respiratory distress with prolonged ventilation during 11/2024 admission, s/p 11/19 tracheostomy and gastrostomy and feeding jejunostomy tube by Dr. Champion, ya catheter dependence who presented to the ED from NH on 5/13 with 4 days progressively worsening abdominal pain. Surgery consulted for imaging concerning for duodenal perforation. History limited by pt's minimally-verbal status. Spoke with daughter on phone, pt is full code at this time, (13 May 2025 21:02)      INFECTIOUS DISEASE HISTORY:  ID consulted for perforated duodenal ulcer, s/p OR  Afebrile; Admission WBC 18  Procedure	PROCEDURES:  Partial antrectomy 14-May-2025 03:37:14  Carlos Cheatham  Operative Findings	prepyloric ulcer 4 cm perforation anterior stomach s/p antrectomy, D1 stapled off due to friable tissue to ensure healthy tissue for staple line. Temporary abdominal closure due to HD instability.  < from: CT Abdomen and Pelvis w/ IV Cont (05.13.25 @ 18:16) >  *1. Since November 8, 2024, new moderate mural thickening and inflammation involving the proximal proximal duodenal wall, with focal   defect/outpouching along lateral duodenum, compatible with perforated duodenal ulcer; no focal drainable fluid collection.  2. Additional chronic/incidental findings, as detailed above.    Pt also tested  + COVID19    Currently ordered for:  caspofungin IVPB 50 milliGRAM(s) IV Intermittent every 24 hours  caspofungin IVPB      piperacillin/tazobactam IVPB.. 3.375 Gram(s) IV Intermittent every 12 hours      PMH  PAST MEDICAL & SURGICAL HISTORY:  HTN (hypertension)      Diabetes mellitus      Obesity      Gastroesophageal reflux disease      Active asthma      Generalized OA      Afib      H/O hernia repair  2011 and revised in 2013      History of cholecystectomy      Status post debridement      H/O tracheostomy      S/P gastrostomy      S/P jejunostomy          FAMILY HISTORY  No pertinent family history in first degree relatives        SOCIAL HISTORY  Social History:        ROS  unable to obtain history secondary to patient's mental status and/or sedation     VITALS:  T(F): 96.6, Max: 98.3 (05-13-25 @ 15:21)  HR: 80  BP: 113/47  RR: 21Vital Signs Last 24 Hrs  T(C): 35.9 (14 May 2025 08:00), Max: 36.8 (13 May 2025 15:21)  T(F): 96.6 (14 May 2025 08:00), Max: 98.3 (13 May 2025 15:21)  HR: 80 (14 May 2025 09:00) (71 - 103)  BP: 113/47 (14 May 2025 09:00) (79/45 - 126/56)  BP(mean): 68 (14 May 2025 09:00) (8 - 93)  RR: 21 (14 May 2025 09:00) (12 - 24)  SpO2: 100% (14 May 2025 09:00) (86% - 100%)    Parameters below as of 14 May 2025 09:00  Patient On (Oxygen Delivery Method): ventilator    O2 Concentration (%): 80    PHYSICAL EXAM:  Gen: trach/ vent, chronically ill appearing  HEENT: NCAT  CV: RRR  Lungs: Decreased BS at bases  Abd: abthera  Neuro: sedated  Skin: no rash   Extremities: warm  Lines: clean, no phlebitis     TESTS & MEASUREMENTS:                        9.6    22.35 )-----------( 363      ( 14 May 2025 04:10 )             31.4     05-14    133[L]  |  102  |  51[H]  ----------------------------<  136[H]  4.6   |  18  |  2.4[H]    Ca    8.4      14 May 2025 04:10  Phos  4.2     05-14  Mg     2.0     05-14    TPro  4.4[L]  /  Alb  2.4[L]  /  TBili  0.5  /  DBili  0.3  /  AST  35  /  ALT  18  /  AlkPhos  114  05-14      LIVER FUNCTIONS - ( 14 May 2025 04:10 )  Alb: 2.4 g/dL / Pro: 4.4 g/dL / ALK PHOS: 114 U/L / ALT: 18 U/L / AST: 35 U/L / GGT: x           Urinalysis Basic - ( 14 May 2025 04:10 )    Color: x / Appearance: x / SG: x / pH: x  Gluc: 136 mg/dL / Ketone: x  / Bili: x / Urobili: x   Blood: x / Protein: x / Nitrite: x   Leuk Esterase: x / RBC: x / WBC x   Sq Epi: x / Non Sq Epi: x / Bacteria: x        Urinalysis with Rflx Culture (collected 05-13-25 @ 17:22)    Culture - Stool (collected 11-23-24 @ 14:11)  Source: .Stool  Final Report (11-25-24 @ 22:02):    No enteric pathogens isolated.    (Stool culture examined for Salmonella,    Shigella, Campylobacter, Aeromonas, Plesiomonas,    Vibrio, E.coli O157 and Yersinia)    Culture - Acid Fast - Bronchial w/Smear (collected 11-20-24 @ 12:00)  Source: Bronchial  Final Report (01-04-25 @ 23:03):    No acid-fast bacilli isolated after 6 weeks.    Culture - Fungal, Bronchial (collected 11-20-24 @ 12:00)  Source: Bronchial  Final Report (12-18-24 @ 23:01):    No fungus isolated at 4 weeks.    Culture - Bronchial (collected 11-20-24 @ 12:00)  Source: Bronchial  Gram Stain (11-20-24 @ 23:18):    Moderate polymorphonuclear leukocytes per low power field    No squamous epithelial cells    Few Gram Positive Cocci in Clusters per oil power field  Final Report (11-22-24 @ 09:09):    Commensal rosalva consistent with body site    Culture - Acid Fast - Bronchial w/Smear (collected 11-19-24 @ 11:25)  Source: Bronchial  Final Report (01-04-25 @ 23:03):    No acid-fast bacilli isolated after 6 weeks.    Culture - Fungal, Bronchial (collected 11-19-24 @ 11:25)  Source: Bronchial  Final Report (12-18-24 @ 23:01):    No fungus isolated at 4 weeks.    Culture - Bronchial (collected 11-19-24 @ 11:25)  Source: Bronch Wash  Gram Stain (11-19-24 @ 23:26):    Rare polymorphonuclear leukocytes per low power field    No Squamous epithelial cells per low power field    No organisms seen per oil power field  Final Report (11-21-24 @ 08:25):    Commensal rosalva consistent with body site    Culture - Viral, General (collected 11-12-24 @ 09:01)    Culture - Bronchial (collected 11-12-24 @ 09:01)  Source: Bronch Wash  Gram Stain (11-12-24 @ 20:33):    No Squamous epithelial cells seen per low power field    Rare polymorphonuclear leukocytes seen per low power field    No organisms seen per oil power field  Final Report (11-14-24 @ 09:38):    Commensal rosalva consistent with body site    Culture - Acid Fast - Bronchial w/Smear (collected 11-12-24 @ 09:01)  Source: Bronch Wash  Final Report (12-28-24 @ 23:03):    No acid-fast bacilli isolated after 6 weeks.    Culture - Fungal, Bronchial (collected 11-12-24 @ 09:01)  Source: Bronchial  Final Report (12-11-24 @ 23:01):    No fungus isolated at 4 weeks.    Culture - Sputum (collected 11-08-24 @ 12:36)  Source: Trach Asp Tracheal Aspirate  Gram Stain (11-09-24 @ 07:34):    Few polymorphonuclear leukocytes per low power field    No Squamous epithelial cells per low power field    Rare Gram positive cocci in pairs seen per oil power field    Rare Gram Negative Rods seen per oil power field  Final Report (11-10-24 @ 09:45):    No growth    Urinalysis with Rflx Culture (collected 11-03-24 @ 13:51)    Culture - Stool (collected 11-03-24 @ 13:51)  Source: .Stool  Final Report (11-05-24 @ 19:02):    No enteric pathogens isolated.    (Stool culture examined for Salmonella,    Shigella, Campylobacter, Aeromonas, Plesiomonas,    Vibrio, E.coli O157 and Yersinia)    Culture - Urine (collected 11-03-24 @ 13:51)  Source: Clean Catch None  Final Report (11-05-24 @ 12:11):    >100,000 CFU/ml Streptococcus gallolyticus "Susceptibilities not    performed"    <10,000 CFU/ml Normal Urogenital rosalva present    Culture - Blood (collected 11-03-24 @ 11:10)  Source: .Blood BLOOD  Final Report (11-08-24 @ 18:00):    No growth at 5 days    Culture - Blood (collected 11-03-24 @ 11:10)  Source: .Blood BLOOD  Final Report (11-08-24 @ 18:00):    No growth at 5 days        Blood Gas Venous - Lactate: 1.5 mmol/L (05-14-25 @ 09:24)  Lactate, Blood: 1.1 mmol/L (05-13-25 @ 19:55)  Blood Gas Venous - Lactate: 1.3 mmol/L (05-13-25 @ 15:59)  Lactate, Blood: 1.2 mmol/L (05-13-25 @ 15:45)      INFECTIOUS DISEASES TESTING  MRSA PCR Result.: Negative (05-14-25 @ 07:20)  MRSA PCR Result.: Negative (04-14-25 @ 17:16)  Procalcitonin: 5.22 ng/mL (11-05-24 @ 19:32)  MRSA PCR Result.: Negative (11-04-24 @ 13:15)      INFLAMMATORY MARKERS      RADIOLOGY & ADDITIONAL TESTS:  I have personally reviewed the last Chest xray  CXR  Xray Chest 1 View- PORTABLE-Urgent:   ACC: [id number] EXAM:  XR CHEST PORTABLE URGENT 1V   ORDERED BY: ROSEANN MCNEIL     PROCEDURE DATE:  05/13/2025          INTERPRETATION:  CLINICAL HISTORY: Shortness of breath    COMPARISON: December 1, 2024.    TECHNIQUE: Portable frontal chest radiograph.    FINDINGS:    Support devices: Telemetry leads overlie the chest. Tracheostomy catheter   is seen.    Cardiac/mediastinum/hilum: Stable cardiomegaly.    Lung parenchyma/Pleura: No focal parenchymal opacities, pleural   effusions, or pneumothorax.    Skeleton/soft tissues: Stable.      IMPRESSION:    No radiographic evidence of acute cardiopulmonary disease. Support   devices as described.    --- End of Report ---            MARISEL VACA MD; Attending Interventional Radiologist  This document has been electronically signed. May 14 2025  7:43AM (05-13-25 @ 18:19)      CT  CT Abdomen and Pelvis w/ IV Cont:   ACC: [id number] EXAM:  CT ABDOMEN AND PELVIS IC   ORDERED BY: ROSEANN MCNEIL     PROCEDURE DATE:  05/13/2025          INTERPRETATION:  CLINICAL INFORMATION: Abdominal pain.    COMPARISON: November 8, 2024.    CONTRAST/COMPLICATIONS:  IV Contrast:Omnipaque 350  100 cc administered   0 cc discarded  Oral Contrast: NONE    PROCEDURE:  CT of the Abdomen and Pelvis was performed.  Sagittal and coronal reformats were performed.    FINDINGS:  LOWER CHEST: Decreased small bibasilar opacities/atelectasis, right   greater than left.    LIVER: Within normal limits.  BILE DUCTS: Normal caliber.  GALLBLADDER: Status post cholecystectomy.  SPLEEN: Within normal limits.  PANCREAS: Within normal limits.  ADRENALS: Unchanged bilateral adrenal gland nodules, left greater than   right.  KIDNEYS/URETERS: Atrophic right kidney. No hydronephrosis.    BLADDER: Urinary bladder is decompressed with Ya catheter balloon in   place.  REPRODUCTIVE ORGANS: Calcified uterine fibroid.    BOWEL: New moderate mural thickening and inflammation involving the   proximal duodenum, with focal defect/outpouching along lateral duodenal   wall, compatible with perforated duodenal ulcer (4/131.) Distal aspect of   a gastrojejunostomy tube is coiled within the stomach. No evidence of   bowel obstruction.  PERITONEUM/RETROPERITONEUM: No focal drainable fluid collection.  VESSELS: Scattered atherosclerotic vascular calcification.  LYMPH NODES: No enlarged abdominal or pelvic lymph nodes.  ABDOMINAL WALL: Within normallimits.  BONES: Degenerative changes of the spine. No acute osseous abnormality.    IMPRESSION:    *1. Since November 8, 2024, new moderate mural thickening and   inflammation involving the proximal proximal duodenal wall, with focal   defect/outpouching along lateral duodenum, compatible with perforated   duodenal ulcer; no focal drainable fluid collection.  2. Additional chronic/incidental findings, as detailed above.      *Spoke with DR. BURNS of the ED on 5/13/2025 7:25 PM with readback.    --- End of Report ---            JET WALDEN MD; Attending Radiologist  This document has been electronically signed. May 13 2025  7:43PM (05-13-25 @ 18:16)      CARDIOLOGY TESTING       MEDICATIONS  acetaminophen   IVPB .. 1000 IV Intermittent once  albuterol    90 MICROgram(s) HFA Inhaler 2 Inhalation every 4 hours  caspofungin IVPB 50 IV Intermittent every 24 hours  caspofungin IVPB     chlorhexidine 0.12% Liquid 15 Oral Mucosa every 12 hours  chlorhexidine 2% Cloths 1 Topical <User Schedule>  dexMEDEtomidine Infusion 0.2 IV Continuous <Continuous>  heparin   Injectable 5000 SubCutaneous every 8 hours  norepinephrine Infusion 0.05 IV Continuous <Continuous>  pantoprazole  Injectable 40 IV Push every 12 hours  piperacillin/tazobactam IVPB.. 3.375 IV Intermittent every 12 hours  sodium bicarbonate  Infusion 0.172 IV Continuous <Continuous>      ANTIBIOTICS:  caspofungin IVPB 50 milliGRAM(s) IV Intermittent every 24 hours  caspofungin IVPB      piperacillin/tazobactam IVPB.. 3.375 Gram(s) IV Intermittent every 12 hours      ALLERGIES:  No Known Allergies

## 2025-05-14 NOTE — CONSULT NOTE ADULT - ASSESSMENT
ASSESSMENT  81F PMHx HTN, a-fib (on xarelto), solitary kidney, DM, GERD, prior open cholecystectomy c/b suture granulomas s/p 2019 abdominal wall debridements by Dr. Banda, respiratory distress with prolonged ventilation during 11/2024 admission, s/p 11/19 tracheostomy and gastrostomy and feeding jejunostomy tube by Dr. Champion, ya catheter dependence who presented to the ED from NH on 5/13 with 4 days progressively worsening abdominal pain. Surgery consulted for imaging concerning for duodenal perforation. S/p OR, ID consulted for antimicrobial management       IMPRESSION  #Perforated Duodenal Ulcer, septic shock requiring pressors     Afebrile; Admission WBC 18  Procedure	PROCEDURES:  Partial antrectomy 14-May-2025 03:37:14  Carlos Cheatham  Operative Findings	prepyloric ulcer 4 cm perforation anterior stomach s/p antrectomy, D1 stapled off due to friable tissue to ensure healthy tissue for staple line. Temporary abdominal closure due to HD instability.  < from: CT Abdomen and Pelvis w/ IV Cont (05.13.25 @ 18:16) >  *1. Since November 8, 2024, new moderate mural thickening and inflammation involving the proximal proximal duodenal wall, with focal   defect/outpouching along lateral duodenum, compatible with perforated duodenal ulcer; no focal drainable fluid collection.  2. Additional chronic/incidental findings, as detailed above.  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  Creatinine: 2.4 (05-14-25 @ 04:10)    Height (cm): 167.6 (05-14-25 @ 00:10)  Weight (kg): 87 (05-14-25 @ 00:10)    RECOMMENDATIONS  This is an incomplete consult note. All final recommendations to follow after interview and examination of the patient. Please follow recommendations noted below.  - CrCl >20, increase to Zosyn 3.375 q8h IV (if <20, decrease to q12h IV dosing)  - Continue caspofungin IVPB 50 milliGRAM(s) IV Intermittent every 24 hours  - 80% FiO2 on ventilator, Start -mg x1 then 100mg x 5 days (cannot rule out hypoxemia from COVID19). Steroids per primary team   - ESR, CRP  - F/u BCX  - F/u OR path, no OR cx pending    If any questions, please send a message or call on [employer]  Please continue to update ID with any pertinent new laboratory, radiographic findings, or change in clinical status   ASSESSMENT  81F PMHx HTN, a-fib (on xarelto), solitary kidney, DM, GERD, prior open cholecystectomy c/b suture granulomas s/p 2019 abdominal wall debridements by Dr. Banda, respiratory distress with prolonged ventilation during 11/2024 admission, s/p 11/19 tracheostomy and gastrostomy and feeding jejunostomy tube by Dr. Champion, ya catheter dependence who presented to the ED from NH on 5/13 with 4 days progressively worsening abdominal pain. Surgery consulted for imaging concerning for duodenal perforation. S/p OR, ID consulted for antimicrobial management       IMPRESSION  #Perforated Duodenal Ulcer, septic shock requiring pressors     Afebrile; Admission WBC 18  Procedure	PROCEDURES:  Partial antrectomy 14-May-2025 03:37:14  Carlos Cheatham  Operative Findings	prepyloric ulcer 4 cm perforation anterior stomach s/p antrectomy, D1 stapled off due to friable tissue to ensure healthy tissue for staple line. Temporary abdominal closure due to HD instability.  < from: CT Abdomen and Pelvis w/ IV Cont (05.13.25 @ 18:16) >  *1. Since November 8, 2024, new moderate mural thickening and inflammation involving the proximal proximal duodenal wall, with focal   defect/outpouching along lateral duodenum, compatible with perforated duodenal ulcer; no focal drainable fluid collection.  2. Additional chronic/incidental findings, as detailed above.  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  Creatinine: 2.4 (05-14-25 @ 04:10)    Height (cm): 167.6 (05-14-25 @ 00:10)  Weight (kg): 87 (05-14-25 @ 00:10)    RECOMMENDATIONS  - CrCl >20, increase to Zosyn 3.375 q8h IV (if <20, decrease to q12h IV dosing)  - Continue caspofungin IVPB 50 milliGRAM(s) IV Intermittent every 24 hours  - Start -mg x1 then 100mg x 2 days (O2 tapered down from 80% to 40%)  - ESR, CRP  - F/u BCX  - F/u OR path, no OR cx pending    If any questions, please send a message or call on [employer] Teams  Please continue to update ID with any pertinent new laboratory, radiographic findings, or change in clinical status

## 2025-05-14 NOTE — PROCEDURAL SAFETY CHECKLIST WITH OR WITHOUT SEDATION - NSPREIMAGEREVIEW_GEN_ALL_CORE
1038-  Patient arrived to phase II via crib to bay 9. Spontaneous respirations even and unlabored. Placed on monitor--VSS. Report received from [facility] and [facility] 82,[place] 100.   8887-  Assessment completed. Patient is tearful. This RN comforting patient. Small amount of bloody drainage wiped from mouth. Patient appears comfortable, scared. IV infusing 0.9 in left hand-- no complications. 1043-  Patient tearful and shakes head \"yes\" to wanting mom. 65-  Mother brought to room. ID band verified. Reassessment completed. Patient meets criteria to be moved to phase II.    1050-  Water given to patient. 1100-  Popsicle given to patient. 1105-  IV removed-- no complications. Bandage applied. 1110-  Discharge instructions given. Understanding verbalized. 1115-  Patient dressed via mother. 1117-  Patient discharged in stable condition with all belongings. Patient carried out by mother. done

## 2025-05-14 NOTE — CONSULT NOTE ADULT - PROBLEM/RECOMMENDATION-1
Thank you for coming in today. It was a pleasure to see you. We want to give the best care possible. If you are notified to take a patient experience survey, please take the time to answer the questions. Your feedback is important to us and helps us know how we are doing. Our team appreciates it. Thank you!    IF YOU NEED ASSISTANCE IN BETWEEN NOW AND YOUR NEXT APPOINTMENT:  -Please call my office at [phone]  -Our neurology team will take your call and route a message to myself and my medical assistant or nurse  -You can also utilize the [facility] [employer] as another form of communication.  -The Neurology clinic is open Monday-Friday 8-4:30 pm.    Results: If you recently had testing performed and your results are normal, we will notify you of your results in a letter. If we need to contact you regarding any abnormal results, we will make 3 attempts to reach you at the number you have listed during your office visit today. If we are unable to reach you, a letter with your results and any further instructions will be mailed to your home.    Refills: If you need a refill of your medication, please contact your pharmacy FIRST. You may have refills on file with them; if not, they will contact our office to refill the prescription on your behalf. You must have a follow appointment scheduled to have a refill sent to your pharmacy. May take 48 to 72 hours to refill a medication so please allow enough time before you run out of medication.     [place] [facility] Hours: Lab in [place] is walk in only no appointments.  Monday - Thursday: 7:00 am - 6:00 pm  Friday: 7:00 am - 5:00 pm  Saturday: 7:00 am - rosie Johnson for the labs call - [phone] to set up a lab appointment.    [facility] call- [phone] to set up a lab appointment.     If Diagnostic testing was ordered contact [phone] to set up appt.    If Cardiac testing was ordered contact [phone]    If cpap supplies were ordered call [facility] 
[facility] @ [phone] to get set up.    If referred for Neuropsych testing with [facility] please call [phone]    
DISPLAY PLAN FREE TEXT
no known allergies

## 2025-05-14 NOTE — PROGRESS NOTE ADULT - ATTENDING COMMENTS
Visited pt at bedside post-op. Intubated and sedated on mechanical ventilation. Unable to participate in ROS.    PE:  General; female, in bed, comfortable  Heart: RRR  Lungs; even chest rise  Abdomen: abthera in place, good seal, minimal output    A/P:  74 F with perforated gastric ulcer s/p distal antrectomy, left in discontinuity with abthera placement.   - Wean levophed/vasopressin  - Post-op labs  - ABG  - Chest xray  - Sodium bicarbonate drip  - Monitor urine output; at least o.5 cc/kg/hr  - MAP goals >65  - DVT PPx; HSQ  - Monitor bun/cr  - Post-op ECHO  - Palliative for a goals of care discussion  - Pain control with fentanyl as needed, tylenol  - Precedex for sedation  - Wean FIO2  - ID for COVID status; possible remdesevir need  - Continue zosyn  - F/U intra-op pathology specimen  - Plan to go back to OR in 24-48 hrs

## 2025-05-15 ENCOUNTER — RESULT REVIEW (OUTPATIENT)
Age: 75
End: 2025-05-15

## 2025-05-15 LAB
ANION GAP SERPL CALC-SCNC: 12 MMOL/L — SIGNIFICANT CHANGE UP (ref 7–14)
APTT BLD: 29.2 SEC — SIGNIFICANT CHANGE UP (ref 27–39.2)
BUN SERPL-MCNC: 35 MG/DL — HIGH (ref 10–20)
CALCIUM SERPL-MCNC: 9 MG/DL — SIGNIFICANT CHANGE UP (ref 8.4–10.5)
CHLORIDE SERPL-SCNC: 105 MMOL/L — SIGNIFICANT CHANGE UP (ref 98–110)
CO2 SERPL-SCNC: 22 MMOL/L — SIGNIFICANT CHANGE UP (ref 17–32)
CREAT SERPL-MCNC: 1.4 MG/DL — SIGNIFICANT CHANGE UP (ref 0.7–1.5)
EGFR: 40 ML/MIN/1.73M2 — LOW
EGFR: 40 ML/MIN/1.73M2 — LOW
ERYTHROCYTE [SEDIMENTATION RATE] IN BLOOD: 45 MM/HR — HIGH (ref 0–20)
GAS PNL BLDA: SIGNIFICANT CHANGE UP
GAS PNL BLDV: SIGNIFICANT CHANGE UP
GLUCOSE SERPL-MCNC: 147 MG/DL — HIGH (ref 70–99)
HCT VFR BLD CALC: 25 % — LOW (ref 37–47)
HGB BLD-MCNC: 8.1 G/DL — LOW (ref 12–16)
INR BLD: 1.31 RATIO — HIGH (ref 0.65–1.3)
MAGNESIUM SERPL-MCNC: 2.1 MG/DL — SIGNIFICANT CHANGE UP (ref 1.8–2.4)
MCHC RBC-ENTMCNC: 28.5 PG — SIGNIFICANT CHANGE UP (ref 27–31)
MCHC RBC-ENTMCNC: 32.4 G/DL — SIGNIFICANT CHANGE UP (ref 32–37)
MCV RBC AUTO: 88 FL — SIGNIFICANT CHANGE UP (ref 81–99)
NRBC BLD AUTO-RTO: 0 /100 WBCS — SIGNIFICANT CHANGE UP (ref 0–0)
PHOSPHATE SERPL-MCNC: 2.8 MG/DL — SIGNIFICANT CHANGE UP (ref 2.1–4.9)
PLATELET # BLD AUTO: 297 K/UL — SIGNIFICANT CHANGE UP (ref 130–400)
PMV BLD: 10.3 FL — SIGNIFICANT CHANGE UP (ref 7.4–10.4)
POTASSIUM SERPL-MCNC: 3.7 MMOL/L — SIGNIFICANT CHANGE UP (ref 3.5–5)
POTASSIUM SERPL-SCNC: 3.7 MMOL/L — SIGNIFICANT CHANGE UP (ref 3.5–5)
PROTHROM AB SERPL-ACNC: 15.5 SEC — HIGH (ref 9.95–12.87)
RBC # BLD: 2.84 M/UL — LOW (ref 4.2–5.4)
RBC # FLD: 14.9 % — HIGH (ref 11.5–14.5)
SODIUM SERPL-SCNC: 139 MMOL/L — SIGNIFICANT CHANGE UP (ref 135–146)
WBC # BLD: 16.67 K/UL — HIGH (ref 4.8–10.8)
WBC # FLD AUTO: 16.67 K/UL — HIGH (ref 4.8–10.8)

## 2025-05-15 PROCEDURE — 71045 X-RAY EXAM CHEST 1 VIEW: CPT | Mod: 26

## 2025-05-15 PROCEDURE — 99291 CRITICAL CARE FIRST HOUR: CPT | Mod: 25

## 2025-05-15 PROCEDURE — 93306 TTE W/DOPPLER COMPLETE: CPT | Mod: 26

## 2025-05-15 PROCEDURE — 99291 CRITICAL CARE FIRST HOUR: CPT

## 2025-05-15 RX ORDER — NYSTATIN 100000 [USP'U]/G
1 CREAM TOPICAL
Refills: 0 | Status: DISCONTINUED | OUTPATIENT
Start: 2025-05-15 | End: 2025-06-03

## 2025-05-15 RX ORDER — POTASSIUM PHOSPHATE, MONOBASIC POTASSIUM PHOSPHATE, DIBASIC INJECTION, 236; 224 MG/ML; MG/ML
15 SOLUTION, CONCENTRATE INTRAVENOUS ONCE
Refills: 0 | Status: COMPLETED | OUTPATIENT
Start: 2025-05-15 | End: 2025-05-15

## 2025-05-15 RX ADMIN — Medication 25 GRAM(S): at 05:51

## 2025-05-15 RX ADMIN — HEPARIN SODIUM 5000 UNIT(S): 1000 INJECTION INTRAVENOUS; SUBCUTANEOUS at 21:19

## 2025-05-15 RX ADMIN — Medication 25 GRAM(S): at 14:48

## 2025-05-15 RX ADMIN — Medication 1 APPLICATION(S): at 06:04

## 2025-05-15 RX ADMIN — Medication 40 MILLIGRAM(S): at 17:55

## 2025-05-15 RX ADMIN — POTASSIUM PHOSPHATE, MONOBASIC POTASSIUM PHOSPHATE, DIBASIC INJECTION, 63.75 MILLIMOLE(S): 236; 224 SOLUTION, CONCENTRATE INTRAVENOUS at 21:19

## 2025-05-15 RX ADMIN — Medication 40 MILLIGRAM(S): at 05:51

## 2025-05-15 RX ADMIN — CASPOFUNGIN ACETATE 260 MILLIGRAM(S): 5 INJECTION, POWDER, LYOPHILIZED, FOR SOLUTION INTRAVENOUS at 23:14

## 2025-05-15 RX ADMIN — Medication 15 MILLILITER(S): at 17:54

## 2025-05-15 RX ADMIN — REMDESIVIR 200 MILLIGRAM(S): 5 INJECTION INTRAVENOUS at 14:48

## 2025-05-15 RX ADMIN — CASPOFUNGIN ACETATE 260 MILLIGRAM(S): 5 INJECTION, POWDER, LYOPHILIZED, FOR SOLUTION INTRAVENOUS at 10:54

## 2025-05-15 RX ADMIN — HEPARIN SODIUM 5000 UNIT(S): 1000 INJECTION INTRAVENOUS; SUBCUTANEOUS at 14:48

## 2025-05-15 RX ADMIN — Medication 12.5 MICROGRAM(S): at 09:37

## 2025-05-15 RX ADMIN — NYSTATIN 1 APPLICATION(S): 100000 CREAM TOPICAL at 17:54

## 2025-05-15 RX ADMIN — Medication 25 GRAM(S): at 21:19

## 2025-05-15 RX ADMIN — HEPARIN SODIUM 5000 UNIT(S): 1000 INJECTION INTRAVENOUS; SUBCUTANEOUS at 05:52

## 2025-05-15 NOTE — PROGRESS NOTE ADULT - SUBJECTIVE AND OBJECTIVE BOX
GENERAL SURGERY PROGRESS NOTE    Patient: PATRICIA SPRAGUE , 74y ([birth year])Female   MRN: [id number]  Location: [place] [address] A  Visit: 05-13-25 Inpatient  Date: 05-15-25 @ 10:50    Hospital Day #:3  Post-Op Day #: 1    Procedure/Dx/Injuries: Perforated duodenal ulcer, s/p ex lap- left in discontinuity    Events of past 24 hours: The patient has been coming down on pressors, is planned for the OR today for exploratory laparotomy and all indicated procedures. Was evaluated by ID yesterday- recommendations were made and followed.     PAST MEDICAL & SURGICAL HISTORY:  HTN (hypertension)  Diabetes mellitus  Obesity  Gastroesophageal reflux disease  Active asthma  Generalized OA  Afib  H/O hernia repair  2011 and revised in 2013  History of cholecystectomy  Status post debridement  H/O tracheostomy  S/P gastrostomy  S/P jejunostomy      Vitals:   T(F): 99.1 (05-15-25 @ 08:00), Max: 99.1 (05-15-25 @ 04:00)  HR: 80 (05-15-25 @ 09:00)  BP: 115/71 (05-15-25 @ 09:00)  RR: 18 (05-15-25 @ 00:00)  SpO2: 100% (05-15-25 @ 09:00)  Mode: AC/ CMV (Assist Control/ Continuous Mandatory Ventilation), RR (machine): 16, TV (machine): 450, FiO2: 30, PEEP: 10, ITime: 0.8, MAP: 13, PIP: 27    Diet, NPO      Fluids: lactated ringers.: Solution, 1000 milliLiter(s) infuse at 110 mL/Hr      I & O's:    05-14-25 @ 07:01  -  05-15-25 @ 07:00  --------------------------------------------------------  IN:    Dexmedetomidine: 30.8 mL    Dexmedetomidine: 139.3 mL    IV PiggyBack: 200 mL    Lactated Ringers: 1630 mL    Lactated Ringers Bolus: 500 mL    Norepinephrine: 420.9 mL    Norepinephrine: 620.5 mL    Sodium Bicarbonate: 100 mL    Sodium Bicarbonate: 100 mL    Sodium Bicarbonate: 300 mL    Sodium Bicarbonate: 300 mL    Vasopressin (Organ Donation): 81 mL  Total IN: 4422.5 mL    OUT:    Drain (mL): 0 mL    Nasogastric/Oral tube (mL): 0 mL    Ureteral Catheter (mL): 1500 mL    VAC (Vacuum Assisted Closure) System (mL): 0 mL  Total OUT: 1500 mL    Total NET: 2922.5 mL    PHYSICAL EXAM:  GENERAL: Intubated, sedated  CHEST/LUNG: Equal chest rise bilaterally  HEART: Regular rate and rhythm  ABDOMEN: Abthera in place  EXTREMITIES:  No clubbing, cyanosis, or edema      MEDICATIONS  (STANDING):  acetaminophen   IVPB .. 1000 milliGRAM(s) IV Intermittent once  caspofungin IVPB 50 milliGRAM(s) IV Intermittent every 24 hours  caspofungin IVPB      chlorhexidine 0.12% Liquid 15 milliLiter(s) Oral Mucosa every 12 hours  chlorhexidine 2% Cloths 1 Application(s) Topical <User Schedule>  dexMEDEtomidine Infusion 0.2 MICROgram(s)/kG/Hr (4.35 mL/Hr) IV Continuous <Continuous>  heparin   Injectable 5000 Unit(s) SubCutaneous every 8 hours  lactated ringers. 1000 milliLiter(s) (110 mL/Hr) IV Continuous <Continuous>  norepinephrine Infusion 0.05 MICROgram(s)/kG/Min (4.08 mL/Hr) IV Continuous <Continuous>  pantoprazole  Injectable 40 milliGRAM(s) IV Push every 12 hours  piperacillin/tazobactam IVPB.. 3.375 Gram(s) IV Intermittent every 8 hours  remdesivir  IVPB 100 milliGRAM(s) IV Intermittent every 24 hours  vasopressin Infusion. 0.03 Unit(s)/Min (4.5 mL/Hr) IV Continuous <Continuous>    MEDICATIONS  (PRN):  fentaNYL    Injectable 12.5 MICROGram(s) IV Push every 4 hours PRN Moderate Pain (4 - 6)  fentaNYL    Injectable 25 MICROGram(s) IV Push every 4 hours PRN Severe Pain (7 - 10)  sodium chloride 0.9% lock flush 10 milliLiter(s) IV Push every 1 hour PRN Pre/post blood products, medications, blood draw, and to maintain line patency      DVT PROPHYLAXIS: heparin   Injectable 5000 Unit(s) SubCutaneous every 8 hours    GI PROPHYLAXIS: pantoprazole  Injectable 40 milliGRAM(s) IV Push every 12 hours    ANTICOAGULATION:   ANTIBIOTICS:  caspofungin IVPB 50 milliGRAM(s)  caspofungin IVPB    piperacillin/tazobactam IVPB.. 3.375 Gram(s)  remdesivir  IVPB 100 milliGRAM(s)      LAB/STUDIES:  Labs:  CAPILLARY BLOOD GLUCOSE      POCT Blood Glucose.: 135 mg/dL (15 May 2025 05:50)  POCT Blood Glucose.: 200 mg/dL (14 May 2025 22:01)  POCT Blood Glucose.: 201 mg/dL (14 May 2025 17:39)  POCT Blood Glucose.: 168 mg/dL (14 May 2025 13:35)                          9.4    22.19 )-----------( 407      ( 14 May 2025 22:15 )             29.3       Auto Immature Granulocyte %: 0.7 % (05-14-25 @ 22:15)    05-14    138  |  104  |  44[H]  ----------------------------<  180[H]  4.7   |  19  |  1.9[H]      Calcium: 8.3 mg/dL (05-14-25 @ 22:15)      LFTs:             4.9  | 0.5  | 20       ------------------[100     ( 14 May 2025 22:15 )  2.7  | 0.2  | 16             Blood Gas Arterial, Lactate: 1.1 mmol/L (05-15-25 @ 05:05)  Blood Gas Arterial, Lactate: 1.3 mmol/L (05-14-25 @ 21:52)  Blood Gas Arterial, Lactate: 1.3 mmol/L (05-14-25 @ 18:16)  Blood Gas Arterial, Lactate: 1.1 mmol/L (05-14-25 @ 12:37)  Blood Gas Arterial, Lactate: 1.2 mmol/L (05-14-25 @ 09:47)  Blood Gas Venous - Lactate: 1.5 mmol/L (05-14-25 @ 09:24)  Blood Gas Arterial, Lactate: 1.2 mmol/L (05-14-25 @ 06:58)  Blood Gas Arterial, Lactate: 1.0 mmol/L (05-14-25 @ 04:17)  Lactate, Blood: 1.1 mmol/L (05-13-25 @ 19:55)  Blood Gas Venous - Lactate: 1.3 mmol/L (05-13-25 @ 15:59)  Lactate, Blood: 1.2 mmol/L (05-13-25 @ 15:45)    ABG - ( 15 May 2025 05:05 )  pH: 7.42  /  pCO2: 29    /  pO2: 90    / HCO3: 19    / Base Excess: -4.9  /  SaO2: 98.4    ABG - ( 14 May 2025 21:52 )  pH: 7.36  /  pCO2: 31    /  pO2: 102   / HCO3: 18    / Base Excess: -7.1  /  SaO2: 98.3    ABG - ( 14 May 2025 18:16 )  pH: 7.36  /  pCO2: 30    /  pO2: 115   / HCO3: 17    / Base Excess: -7.3  /  SaO2: 98.6      Coags:     11.80  ----< 1.00    ( 14 May 2025 22:15 )     27.0        Urinalysis Basic - ( 14 May 2025 22:15 )    Color: x / Appearance: x / SG: x / pH: x  Gluc: 180 mg/dL / Ketone: x  / Bili: x / Urobili: x   Blood: x / Protein: x / Nitrite: x   Leuk Esterase: x / RBC: x / WBC x   Sq Epi: x / Non Sq Epi: x / Bacteria: x        Urinalysis with Rflx Culture (collected 13 May 2025 17:22)    Culture - Urine (collected 13 May 2025 17:22)  Source: Catheterized None  Preliminary Report (15 May 2025 00:33):    >100,000 CFU/ml Escherichia coli    Culture - Blood (collected 13 May 2025 15:45)  Source: Blood Blood-Peripheral  Preliminary Report (14 May 2025 23:09):    No growth at 24 hours    Culture - Blood (collected 13 May 2025 15:35)  Source: Blood Blood-Peripheral  Preliminary Report (14 May 2025 23:09):    No growth at 24 hours    IMAGING:      ACCESS/ DEVICES:  [x ] Peripheral IV

## 2025-05-15 NOTE — PROGRESS NOTE ADULT - SUBJECTIVE AND OBJECTIVE BOX
DARCIE PATRICIA  74y, Female  Allergy: No Known Allergies      LOS  2d    CHIEF COMPLAINT:     INTERVAL EVENTS/HPI  - T(F): , Max: 99.1 (05-15-25 @ 04:00)  - WBC Count: 22.19 (05-14-25 @ 22:15)  WBC Count: 22.35 (05-14-25 @ 04:10)     - Creatinine: 1.9 (05-14-25 @ 22:15)  Creatinine: 2.4 (05-14-25 @ 04:10)     -   -   -     ROS  cannot obtain secondary to patient's sedation and/or mental status    VITALS:  T(F): 99.1, Max: 99.1 (05-15-25 @ 04:00)  HR: 79  BP: 108/61  RR: 18Vital Signs Last 24 Hrs  T(C): 37.3 (15 May 2025 08:00), Max: 37.3 (15 May 2025 04:00)  T(F): 99.1 (15 May 2025 08:00), Max: 99.1 (15 May 2025 04:00)  HR: 79 (15 May 2025 08:15) (60 - 86)  BP: 108/61 (15 May 2025 08:15) (90/66 - 153/67)  BP(mean): 74 (15 May 2025 08:15) (65 - 105)  RR: 18 (15 May 2025 00:00) (16 - 21)  SpO2: 100% (15 May 2025 08:15) (99% - 100%)    Parameters below as of 15 May 2025 00:00  Patient On (Oxygen Delivery Method): ventilator    O2 Concentration (%): 40    PHYSICAL EXAM:  ***    FH: Non-contributory  Social Hx: Non-contributory    TESTS & MEASUREMENTS:                        9.4    22.19 )-----------( 407      ( 14 May 2025 22:15 )             29.3     05-14    138  |  104  |  44[H]  ----------------------------<  180[H]  4.7   |  19  |  1.9[H]    Ca    8.3[L]      14 May 2025 22:15  Phos  3.6     05-14  Mg     1.8     05-14    TPro  4.9[L]  /  Alb  2.7[L]  /  TBili  0.5  /  DBili  0.2  /  AST  20  /  ALT  16  /  AlkPhos  100  05-14      LIVER FUNCTIONS - ( 14 May 2025 22:15 )  Alb: 2.7 g/dL / Pro: 4.9 g/dL / ALK PHOS: 100 U/L / ALT: 16 U/L / AST: 20 U/L / GGT: x           Urinalysis Basic - ( 14 May 2025 22:15 )    Color: x / Appearance: x / SG: x / pH: x  Gluc: 180 mg/dL / Ketone: x  / Bili: x / Urobili: x   Blood: x / Protein: x / Nitrite: x   Leuk Esterase: x / RBC: x / WBC x   Sq Epi: x / Non Sq Epi: x / Bacteria: x        Urinalysis with Rflx Culture (collected 05-13-25 @ 17:22)    Culture - Urine (collected 05-13-25 @ 17:22)  Source: Catheterized None  Preliminary Report (05-15-25 @ 00:33):    >100,000 CFU/ml Escherichia coli    Culture - Blood (collected 05-13-25 @ 15:45)  Source: Blood Blood-Peripheral  Preliminary Report (05-14-25 @ 23:09):    No growth at 24 hours    Culture - Blood (collected 05-13-25 @ 15:35)  Source: Blood Blood-Peripheral  Preliminary Report (05-14-25 @ 23:09):    No growth at 24 hours        Blood Gas Venous - Lactate: 1.5 mmol/L (05-14-25 @ 09:24)  Lactate, Blood: 1.1 mmol/L (05-13-25 @ 19:55)  Blood Gas Venous - Lactate: 1.3 mmol/L (05-13-25 @ 15:59)  Lactate, Blood: 1.2 mmol/L (05-13-25 @ 15:45)      INFECTIOUS DISEASES TESTING  MRSA PCR Result.: Negative (05-14-25 @ 07:20)  MRSA PCR Result.: Negative (04-14-25 @ 17:16)  Procalcitonin: 5.22 (11-05-24 @ 19:32)  MRSA PCR Result.: Negative (11-04-24 @ 13:15)      INFLAMMATORY MARKERS  Sedimentation Rate, Erythrocyte: 45 mm/hr (05-14-25 @ 22:15)  C-Reactive Protein: 154.6 mg/L (05-14-25 @ 22:15)      RADIOLOGY & ADDITIONAL TESTS:  I have personally reviewed the last available Chest xray  CXR  Xray Chest 1 View- PORTABLE-Urgent:   ACC: [id number] EXAM:  XR CHEST PORTABLE URGENT 1V   ORDERED BY: SENIA CONDON     PROCEDURE DATE:  05/14/2025          INTERPRETATION:  CLINICAL HISTORY: Support devices positioning, status   post ex lap.    COMPARISON: Chest radiograph dated5/13/2025.    TECHNIQUE: Portable frontal chest radiograph.    FINDINGS:    Support devices: Overlying telemetry leads. Enteric tube courses below   the diaphragm with tip outside the field of view. Partially visualized   tracheostomy catheter terminates 6.7 cm above the tor. Left internal   jugular central venous catheter terminating very proximally, possibly in   the left IJ.    Cardiac/mediastinum/hilum: Unchanged.    Lung parenchyma/Pleura: Bibasilar opacities. No pneumothorax.    Skeleton/soft tissues: Unchanged.      IMPRESSION:    Left internal jugular central venous catheter terminates very proximally,   possibly in the left IJ.   Enteric tube extends below diaphragm. Post tracheostomy.    Communication: The summary of above findings were discussed with readback   confirmation with Dr. Maxime Giles by resident Jimy Teran MD on   5/14/2025 at 8:59 AM.    --- End of Report ---          JIMY TERAN MD; Resident Radiologist  This document has been electronically signed.  CHRISSY PRITCHARD; Attending Radiologist  This document has been electronically signed. May 14 2025 12:05PM (05-14-25 @ 06:24)      CT  CT Abdomen and Pelvis w/ IV Cont:   ACC: [id number] EXAM:  CT ABDOMEN AND PELVIS IC   ORDERED BY: ROSEANN MCNEIL     PROCEDURE DATE:  05/13/2025          INTERPRETATION:  CLINICAL INFORMATION: Abdominal pain.    COMPARISON: November 8, 2024.    CONTRAST/COMPLICATIONS:  IV Contrast:Omnipaque 350  100 cc administered   0 cc discarded  Oral Contrast: NONE    PROCEDURE:  CT of the Abdomen and Pelvis was performed.  Sagittal and coronal reformats were performed.    FINDINGS:  LOWER CHEST: Decreased small bibasilar opacities/atelectasis, right   greater than left.    LIVER: Within normal limits.  BILE DUCTS: Normal caliber.  GALLBLADDER: Status post cholecystectomy.  SPLEEN: Within normal limits.  PANCREAS: Within normal limits.  ADRENALS: Unchanged bilateral adrenal gland nodules, left greater than   right.  KIDNEYS/URETERS: Atrophic right kidney. No hydronephrosis.    BLADDER: Urinary bladder is decompressed with Burger catheter balloon in   place.  REPRODUCTIVE ORGANS: Calcified uterine fibroid.    BOWEL: New moderate mural thickening and inflammation involving the   proximal duodenum, with focal defect/outpouching along lateral duodenal   wall, compatible with perforated duodenal ulcer (4/131.) Distal aspect of   a gastrojejunostomy tube is coiled within the stomach. No evidence of   bowel obstruction.  PERITONEUM/RETROPERITONEUM: No focal drainable fluid collection.  VESSELS: Scattered atherosclerotic vascular calcification.  LYMPH NODES: No enlarged abdominal or pelvic lymph nodes.  ABDOMINAL WALL: Within normallimits.  BONES: Degenerative changes of the spine. No acute osseous abnormality.    IMPRESSION:    *1. Since November 8, 2024, new moderate mural thickening and   inflammation involving the proximal proximal duodenal wall, with focal   defect/outpouching along lateral duodenum, compatible with perforated   duodenal ulcer; no focal drainable fluid collection.  2. Additional chronic/incidental findings, as detailed above.      *Spoke with DR. BURNS of the ED on 5/13/2025 7:25 PM with readback.    --- End of Report ---            JET WALDEN MD; Attending Radiologist  This document has been electronically signed. May 13 2025  7:43PM (05-13-25 @ 18:16)      CARDIOLOGY TESTING  12 Lead ECG:   Ventricular Rate 83 BPM    QRS Duration 104 ms    Q-T Interval 380 ms    QTC Calculation(Bazett) 446 ms    R Axis -22 degrees    T Axis 28 degrees    Diagnosis Line Atrial fibrillation  Low voltage QRS  Septal infarct , age undetermined  Abnormal ECG    Confirmed by Sanjeev Pierce ([id number]) on 5/14/2025 6:59:31 PM (05-13-25 @ 23:32)  12 Lead ECG:   Ventricular Rate 82 BPM    QRS Duration 104 ms    Q-T Interval 396 ms    QTC Calculation(Bazett) 462 ms    R Axis 144 degrees    T Axis 33 degrees    Diagnosis Line Atrial fibrillation  Right axis deviation  Low voltage QRS  Abnormal ECG    Confirmed by Sanjeev Pierce ([id number]) on 5/14/2025 6:56:22 PM (05-13-25 @ 17:11)      MEDICATIONS  acetaminophen   IVPB .. 1000  caspofungin IVPB 50  caspofungin IVPB   chlorhexidine 0.12% Liquid 15  chlorhexidine 2% Cloths 1  dexMEDEtomidine Infusion 0.2  heparin   Injectable 5000  lactated ringers. 1000  norepinephrine Infusion 0.05  pantoprazole  Injectable 40  piperacillin/tazobactam IVPB.. 3.375  remdesivir  IVPB 100  vasopressin Infusion. 0.03      WEIGHT  Weight (kg): 87 (05-14-25 @ 00:10)  Creatinine: 1.9 mg/dL (05-14-25 @ 22:15)      ANTIBIOTICS:  caspofungin IVPB 50 milliGRAM(s) IV Intermittent every 24 hours  caspofungin IVPB      piperacillin/tazobactam IVPB.. 3.375 Gram(s) IV Intermittent every 8 hours  remdesivir  IVPB 100 milliGRAM(s) IV Intermittent every 24 hours      All available historical records have been reviewed   DARCIEPATRICIA  74y, Female  Allergy: No Known Allergies      LOS  2d    CHIEF COMPLAINT:     INTERVAL EVENTS/HPI  - T(F): , Max: 99.1 (05-15-25 @ 04:00)  - WBC Count: 22.19 (05-14-25 @ 22:15)  WBC Count: 22.35 (05-14-25 @ 04:10)     - Creatinine: 1.9 (05-14-25 @ 22:15)  Creatinine: 2.4 (05-14-25 @ 04:10)     -   -   -     ROS  cannot obtain secondary to patient's sedation and/or mental status    VITALS:  T(F): 99.1, Max: 99.1 (05-15-25 @ 04:00)  HR: 79  BP: 108/61  RR: 18Vital Signs Last 24 Hrs  T(C): 37.3 (15 May 2025 08:00), Max: 37.3 (15 May 2025 04:00)  T(F): 99.1 (15 May 2025 08:00), Max: 99.1 (15 May 2025 04:00)  HR: 79 (15 May 2025 08:15) (60 - 86)  BP: 108/61 (15 May 2025 08:15) (90/66 - 153/67)  BP(mean): 74 (15 May 2025 08:15) (65 - 105)  RR: 18 (15 May 2025 00:00) (16 - 21)  SpO2: 100% (15 May 2025 08:15) (99% - 100%)    Parameters below as of 15 May 2025 00:00  Patient On (Oxygen Delivery Method): ventilator    O2 Concentration (%): 40    PHYSICAL EXAM:  Gen: trach/ vent, chronically ill appearing  HEENT: NCAT  CV: RRR  Lungs: Decreased BS at bases  Abd: abthera  Neuro: sedated  Skin: no rash   Extremities: warm  Lines: clean, no phlebitis   FH: Non-contributory  Social Hx: Non-contributory    TESTS & MEASUREMENTS:                        9.4    22.19 )-----------( 407      ( 14 May 2025 22:15 )             29.3     05-14    138  |  104  |  44[H]  ----------------------------<  180[H]  4.7   |  19  |  1.9[H]    Ca    8.3[L]      14 May 2025 22:15  Phos  3.6     05-14  Mg     1.8     05-14    TPro  4.9[L]  /  Alb  2.7[L]  /  TBili  0.5  /  DBili  0.2  /  AST  20  /  ALT  16  /  AlkPhos  100  05-14      LIVER FUNCTIONS - ( 14 May 2025 22:15 )  Alb: 2.7 g/dL / Pro: 4.9 g/dL / ALK PHOS: 100 U/L / ALT: 16 U/L / AST: 20 U/L / GGT: x           Urinalysis Basic - ( 14 May 2025 22:15 )    Color: x / Appearance: x / SG: x / pH: x  Gluc: 180 mg/dL / Ketone: x  / Bili: x / Urobili: x   Blood: x / Protein: x / Nitrite: x   Leuk Esterase: x / RBC: x / WBC x   Sq Epi: x / Non Sq Epi: x / Bacteria: x        Urinalysis with Rflx Culture (collected 05-13-25 @ 17:22)    Culture - Urine (collected 05-13-25 @ 17:22)  Source: Catheterized None  Preliminary Report (05-15-25 @ 00:33):    >100,000 CFU/ml Escherichia coli    Culture - Blood (collected 05-13-25 @ 15:45)  Source: Blood Blood-Peripheral  Preliminary Report (05-14-25 @ 23:09):    No growth at 24 hours    Culture - Blood (collected 05-13-25 @ 15:35)  Source: Blood Blood-Peripheral  Preliminary Report (05-14-25 @ 23:09):    No growth at 24 hours        Blood Gas Venous - Lactate: 1.5 mmol/L (05-14-25 @ 09:24)  Lactate, Blood: 1.1 mmol/L (05-13-25 @ 19:55)  Blood Gas Venous - Lactate: 1.3 mmol/L (05-13-25 @ 15:59)  Lactate, Blood: 1.2 mmol/L (05-13-25 @ 15:45)      INFECTIOUS DISEASES TESTING  MRSA PCR Result.: Negative (05-14-25 @ 07:20)  MRSA PCR Result.: Negative (04-14-25 @ 17:16)  Procalcitonin: 5.22 (11-05-24 @ 19:32)  MRSA PCR Result.: Negative (11-04-24 @ 13:15)      INFLAMMATORY MARKERS  Sedimentation Rate, Erythrocyte: 45 mm/hr (05-14-25 @ 22:15)  C-Reactive Protein: 154.6 mg/L (05-14-25 @ 22:15)      RADIOLOGY & ADDITIONAL TESTS:  I have personally reviewed the last available Chest xray  CXR  Xray Chest 1 View- PORTABLE-Urgent:   ACC: [id number] EXAM:  XR CHEST PORTABLE URGENT 1V   ORDERED BY: SENIA CONDON     PROCEDURE DATE:  05/14/2025          INTERPRETATION:  CLINICAL HISTORY: Support devices positioning, status   post ex lap.    COMPARISON: Chest radiograph dated5/13/2025.    TECHNIQUE: Portable frontal chest radiograph.    FINDINGS:    Support devices: Overlying telemetry leads. Enteric tube courses below   the diaphragm with tip outside the field of view. Partially visualized   tracheostomy catheter terminates 6.7 cm above the tor. Left internal   jugular central venous catheter terminating very proximally, possibly in   the left IJ.    Cardiac/mediastinum/hilum: Unchanged.    Lung parenchyma/Pleura: Bibasilar opacities. No pneumothorax.    Skeleton/soft tissues: Unchanged.      IMPRESSION:    Left internal jugular central venous catheter terminates very proximally,   possibly in the left IJ.   Enteric tube extends below diaphragm. Post tracheostomy.    Communication: The summary of above findings were discussed with readback   confirmation with Dr. Maxime Giles by resident Jimy Teran MD on   5/14/2025 at 8:59 AM.    --- End of Report ---          JIMY TERAN MD; Resident Radiologist  This document has been electronically signed.  CHRISSY PRITCHARD; Attending Radiologist  This document has been electronically signed. May 14 2025 12:05PM (05-14-25 @ 06:24)      CT  CT Abdomen and Pelvis w/ IV Cont:   ACC: [id number] EXAM:  CT ABDOMEN AND PELVIS IC   ORDERED BY: ROSEANN MCNEIL     PROCEDURE DATE:  05/13/2025          INTERPRETATION:  CLINICAL INFORMATION: Abdominal pain.    COMPARISON: November 8, 2024.    CONTRAST/COMPLICATIONS:  IV Contrast:Omnipaque 350  100 cc administered   0 cc discarded  Oral Contrast: NONE    PROCEDURE:  CT of the Abdomen and Pelvis was performed.  Sagittal and coronal reformats were performed.    FINDINGS:  LOWER CHEST: Decreased small bibasilar opacities/atelectasis, right   greater than left.    LIVER: Within normal limits.  BILE DUCTS: Normal caliber.  GALLBLADDER: Status post cholecystectomy.  SPLEEN: Within normal limits.  PANCREAS: Within normal limits.  ADRENALS: Unchanged bilateral adrenal gland nodules, left greater than   right.  KIDNEYS/URETERS: Atrophic right kidney. No hydronephrosis.    BLADDER: Urinary bladder is decompressed with Burger catheter balloon in   place.  REPRODUCTIVE ORGANS: Calcified uterine fibroid.    BOWEL: New moderate mural thickening and inflammation involving the   proximal duodenum, with focal defect/outpouching along lateral duodenal   wall, compatible with perforated duodenal ulcer (4/131.) Distal aspect of   a gastrojejunostomy tube is coiled within the stomach. No evidence of   bowel obstruction.  PERITONEUM/RETROPERITONEUM: No focal drainable fluid collection.  VESSELS: Scattered atherosclerotic vascular calcification.  LYMPH NODES: No enlarged abdominal or pelvic lymph nodes.  ABDOMINAL WALL: Within normallimits.  BONES: Degenerative changes of the spine. No acute osseous abnormality.    IMPRESSION:    *1. Since November 8, 2024, new moderate mural thickening and   inflammation involving the proximal proximal duodenal wall, with focal   defect/outpouching along lateral duodenum, compatible with perforated   duodenal ulcer; no focal drainable fluid collection.  2. Additional chronic/incidental findings, as detailed above.      *Spoke with DR. BURNS of the ED on 5/13/2025 7:25 PM with readback.    --- End of Report ---            JET WALDEN MD; Attending Radiologist  This document has been electronically signed. May 13 2025  7:43PM (05-13-25 @ 18:16)      CARDIOLOGY TESTING  12 Lead ECG:   Ventricular Rate 83 BPM    QRS Duration 104 ms    Q-T Interval 380 ms    QTC Calculation(Bazett) 446 ms    R Axis -22 degrees    T Axis 28 degrees    Diagnosis Line Atrial fibrillation  Low voltage QRS  Septal infarct , age undetermined  Abnormal ECG    Confirmed by Sanjeev Pierce ([id number]) on 5/14/2025 6:59:31 PM (05-13-25 @ 23:32)  12 Lead ECG:   Ventricular Rate 82 BPM    QRS Duration 104 ms    Q-T Interval 396 ms    QTC Calculation(Bazett) 462 ms    R Axis 144 degrees    T Axis 33 degrees    Diagnosis Line Atrial fibrillation  Right axis deviation  Low voltage QRS  Abnormal ECG    Confirmed by Sanjeev Pierce ([id number]) on 5/14/2025 6:56:22 PM (05-13-25 @ 17:11)      MEDICATIONS  acetaminophen   IVPB .. 1000  caspofungin IVPB 50  caspofungin IVPB   chlorhexidine 0.12% Liquid 15  chlorhexidine 2% Cloths 1  dexMEDEtomidine Infusion 0.2  heparin   Injectable 5000  lactated ringers. 1000  norepinephrine Infusion 0.05  pantoprazole  Injectable 40  piperacillin/tazobactam IVPB.. 3.375  remdesivir  IVPB 100  vasopressin Infusion. 0.03      WEIGHT  Weight (kg): 87 (05-14-25 @ 00:10)  Creatinine: 1.9 mg/dL (05-14-25 @ 22:15)      ANTIBIOTICS:  caspofungin IVPB 50 milliGRAM(s) IV Intermittent every 24 hours  caspofungin IVPB      piperacillin/tazobactam IVPB.. 3.375 Gram(s) IV Intermittent every 8 hours  remdesivir  IVPB 100 milliGRAM(s) IV Intermittent every 24 hours      All available historical records have been reviewed

## 2025-05-15 NOTE — PROGRESS NOTE ADULT - ASSESSMENT
82 yo female with extensive PMH presenting for perforated duodenal ulcer   # NOLA most likely ATN sp perforated duodenal ulcer sp OR   # acidosis  # VDRF   # sepsis   - cr trending down   - start sodium  bicarbonate 650 q 8   - start [id number] q 8   - ph at goal   - atrophic right kidney on CT scan / no hydro   - antibx as per ID dose to GFR < 30 ml/min/ on remdesivir for COVID   - no need for RRT yet   renal team will follow closely

## 2025-05-15 NOTE — PROGRESS NOTE ADULT - ATTENDING COMMENTS
Visited pt at bedside post-op. Intubated and sedated on mechanical ventilation. Unable to participate in ROS.    PE:  General; female, in bed, comfortable, opens eyes to voice   Heart: RRR  Lungs; even chest rise  Abdomen: abthera in place, good seal, minimal output, tender on abdominal palpation     A/P:  74 F with perforated gastric ulcer s/p distal antrectomy, left in discontinuity with abthera placement.   - Weaning pressors this AM, levophed/vasopressin   - Off sodium bicarbonate  - ABG; acidosis corrected   - Monitor urine output; at least o.5 cc/kg/hr  - MAP goals >65  - DVT PPx; HSQ  - Monitor bun/cr; improving (non-oliguric NOLA)  - ECHO showing reduced EF of 30%   - Appreciate palliative care team engagement   - Pain control with fentanyl as needed, tylenol  - Precedex for sedation  - ID for COVID status; continue remdesevir   - Continue zosyn  - Pending OR takeback today; pending OR availability   -Pt's son and daughter updated at bedside this AM; explained need to take back to OR today for GI continuity. Explained risks and benefits to daughter including bleeding, infection, possibility of leak post-op given her malnourished state and continued pressor requirements. Daughter understands risks.

## 2025-05-15 NOTE — CONSULT NOTE ADULT - ASSESSMENT
75 yo female PMH HTN, a-fib (on xarelto), solitary kidney, DM, GERD, prior open cholecystectomy c/b suture granulomas s/p 2019 abdominal wall debridements by Dr. Banda, respiratory distress with prolonged ventilation during 11/2024 admission, s/p 11/19 tracheostomy and gastrostomy and feeding jejunostomy tube by Dr. Champion, ya catheter dependence who presented from the nursing home with abdominal pain and was admitted to SICU for perforation of prepyloric ulcer, now s/p ex-lap with antrectomy and D1 stapling. Currently in septic shock in the SICU on levo and vaso. Also has covid and positive Urine Culture. Being treated with zosyn, caspo and RDV. Cardio is consulted for concerning ECG with afib and PVCs and concern for VT overnight on tele. Patient is minimally verbal and a poor historian. Home cardiac meds are xarelto and losartan 100 which are currently being held.    #Septic Shock- Perforated gastric ulcer, Covid, Positive Urine Culture  #Chronic Afib w PVCs  #HTN  -Tele Reviewed, shows rate-controlled afib and PVCs  -ECG shows the same and is similar to prior ECGs  -MNGMT of septic shock per SICU  -Start short-acting AC for afib    INCOMPLETE NOTE. PENDING EVALUATION BY THE CARDIOLOGY ATTENDING   75 yo female PMH HTN, a-fib (on xarelto), solitary kidney, DM, GERD, prior open cholecystectomy c/b suture granulomas s/p 2019 abdominal wall debridements by Dr. Banda, respiratory distress with prolonged ventilation during 11/2024 admission, s/p 11/19 tracheostomy and gastrostomy and feeding jejunostomy tube by Dr. Champion, ya catheter dependence who presented from the nursing home with abdominal pain and was admitted to SICU for perforation of prepyloric ulcer, now s/p ex-lap with antrectomy and D1 stapling. Currently in septic shock in the SICU on levo and vaso. Also has covid and positive Urine Culture. Being treated with zosyn, caspo and RDV. Cardio is consulted for concerning ECG with afib and PVCs and concern for VT overnight on tele. Patient is minimally verbal and a poor historian. Home cardiac meds are xarelto and losartan 100 which are currently being held.    #Septic Shock- Perforated gastric ulcer, Covid, Positive Urine Culture  #Chronic Afib w PVCs  #NSVT  #HFrEF with multiple RWMA- most likely sepsis-induced stress cardiomyopathy, less likely to be ischemic  #HTN  -Tele Reviewed, shows rate-controlled afib and PVCs and 2 episodes of NSVT  -ECG shows the same and is similar to prior ECGs  -MNGMT of septic shock per SICU  -Start short-acting AC for afib  -Once sepsis is improved start beta blockers for stress cardiomyopathy  -For NSVT, continue to monitor on tele, and start beta blockers when no longer septic. If develops into sustained VT, start amio     75 yo female PMH HTN, a-fib (on xarelto), solitary kidney, DM, GERD, prior open cholecystectomy c/b suture granulomas s/p 2019 abdominal wall debridements by Dr. Banda, respiratory distress with prolonged ventilation during 11/2024 admission, s/p 11/19 tracheostomy and gastrostomy and feeding jejunostomy tube by Dr. Champion, ya catheter dependence who presented from the nursing home with abdominal pain and was admitted to SICU for perforation of prepyloric ulcer, now s/p ex-lap with antrectomy and D1 stapling. Currently in septic shock in the SICU on levo and vaso. Also has covid and positive Urine Culture. Being treated with zosyn, caspo and RDV. Cardio is consulted for concerning ECG with afib and PVCs and concern for VT overnight on tele. Patient is minimally verbal and a poor historian. Home cardiac meds are xarelto and losartan 100 which are currently being held.    #Septic Shock- Perforated gastric ulcer, Covid, Positive Urine Culture  #Chronic Afib w PVCs  #NSVT  #HFrEF with multiple RWMA- most likely sepsis-induced stress cardiomyopathy, less likely to be ischemic  #HTN  -Tele Reviewed, shows rate-controlled afib and PVCs and 2 episodes of NSVT  -ECG shows rate-controlled afib and PVCs and is similar to prior ECGs  -MNGMT of septic shock per SICU  -Start short-acting AC for afib  -Once sepsis is improved start beta blockers for stress cardiomyopathy  -For NSVT, continue to monitor on tele, and start beta blockers when no longer septic. If develops into sustained VT, start amio

## 2025-05-15 NOTE — PROGRESS NOTE ADULT - ASSESSMENT
74F found to have perforated peptic ulcer now s/p ex lap, distal antrectomy, abthera placement requiring vasopressors.    NEUROLOGICAL:  #sedation  - precedex infusion, RASS -2 (open abdomen)  #Acute pain    -IV APAP prn    -fentanyl 12.5mcg prn moderate pain   - fentanyl 25mcg prn severe pain    RESPIRATORY:   #Mechanical ventilation, s/p previous tracheostomy (11/24)    - now with 8 portex cuffed trach (switched in OR on 5/14)    - Vent settings: 400/12/30/10    - continue duonebs   #Decreased small bibasilar opacities/atelectasis, right greater than left on CT  #Activity    -bedrest given open abdomen     CARDS:   #hypotension 2/2 sepsis   - currently on levophed gtt and vasopressin @0.03  - MAP >65  #hx afib  - holding home Xarelto 15 QD (s/p PCC in OR)  #hx HTN  - holding home losartan 100QD  #elevated troponin  - 143 > 84   - no longer trending  Imaging:   #TTE 11/24: EF 71%. Mild-mod AS. Trivial pericardial effusion.   - repeat TTE pending     GASTROINTESTINAL/NUTRITION:   #perforated prepyloric gastric ulcer s/p exploratory laparotomy antrectomy and D1 stapled off with temporary abdominal closure and abthera in place  - plan to RTOR later today for closure   - strict NPO/NGT to LCWS  #Diet, Strict NPO    - G tube to gravity     - aspiration precautions, HOB 30    - hx of GJ placement (11/24)   #GI Prophylaxis    - protonix 40bid IV  #Bowel regimen    -holding    -last bowel movement prior to admission    /RENAL:   #urine output in critically ill    -chronic indwelling ya  #Maintain euvolemia    - LR @ 110cc/hr  #metabolic acidosis    - off sodium bicarbonate infusion  #NOLA (baseline creatine 0.6)  - nephrology consulted > no RRT at this time  #hx congential solitary kidney     Labs:          BUN/Cr- 51/2.4  -->,  44/1.9  -->          [05-14 @ 22:15]Na  138 // K  4.7 // Mg  1.8 // Phos  3.6  [05-14 @ 04:10]Na  133 // K  4.6 // Mg  2.0 // Phos  4.2         HEME/ONC:   #DVT prophylaxis    -heparin   Injectable    -SCDs    Labs: Hb/Hct:  9.6/31.4  -->,  9.4/29.3  -->                      Plts:  363  -->,  407  -->                 PTT/INR:  24.7/0.99  --->,  27.0/1.00  --->     T&S Expires: 5/16    ID:  #perforated duodenal ulcer  - zosyn (renal dosing) (5/14 - )  - caspofungin per primary team (5/14 - )  WBC- 15.53  --->>,  22.35  --->>,  22.19  --->>  Temp trend- 24hrs T(F): 98 (05-14 @ 20:00), Max: 98.4 (05-14 @ 12:00)  Current antibiotics-caspofungin IVPB 50 every 24 hours  caspofungin IVPB    piperacillin/tazobactam IVPB.. 3.375 every 8 hours  remdesivir  IVPB 100 every 24 hours  #MRSA pending   #COVID positive on admission    - remdesivir 200mg x1, 100mg x 2 day course  #Cultures    - 5/13: blood culture: pending    - 5/13: blood culture: pending    - 5/13: urine culture: pending  - ID following    ENDOCRINE:  #glycemic monitoring    -FSG q6 while NPO    -Glucose goal 140-180. If above 180, will start corrective insulin sliding scale  #hypercalcemia  - holding home cinacalcet 30mg bid while NPO    MSK:  #Activity - bedrest given open abdomen/abthera   #hx osteoporosis  - holding home Alendronate 70mg weekly on Thursdays while NPO    SKIN:  #DTI screening negative 5/14    - will continue to monitor daily skin changes      PALLIATIVE:  Currently full code, palliative consult       LINES/DRAINS:  PIV, Ya (chronic), GJ in place (chronic), 8 cuffed portex trach, L radial arterial line (5/14- ), LIJ CVC (5/14- )  ADVANCED DIRECTIVES:  Full Code    HCP/Emergency Contact- pérez Brooks [phone]  INDICATION FOR SICU/SDU:  perforated peptic ulcer; mechanical ventilation/vasopressors   DISPO:  SICU. Case to be discussed with attending Dr. Pena

## 2025-05-15 NOTE — PROGRESS NOTE ADULT - ASSESSMENT
ASSESSMENT  81F PMHx HTN, a-fib (on xarelto), solitary kidney, DM, GERD, prior open cholecystectomy c/b suture granulomas s/p 2019 abdominal wall debridements by Dr. Banda, respiratory distress with prolonged ventilation during 11/2024 admission, s/p 11/19 tracheostomy and gastrostomy and feeding jejunostomy tube by Dr. Champion, ya catheter dependence who presented to the ED from NH on 5/13 with 4 days progressively worsening abdominal pain. Surgery consulted for imaging concerning for duodenal perforation. S/p OR, ID consulted for antimicrobial management       IMPRESSION  #Perforated Duodenal Ulcer, septic shock requiring pressors     Afebrile; Admission WBC 18    5/13 BCX NGTD     5/13 UCX   >100,000 CFU/ml Escherichia coli  Procedure	PROCEDURES:  Partial antrectomy 14-May-2025 03:37:14  Carlos Cheatham  Operative Findings	prepyloric ulcer 4 cm perforation anterior stomach s/p antrectomy, D1 stapled off due to friable tissue to ensure healthy tissue for staple line. Temporary abdominal closure due to HD instability.  < from: CT Abdomen and Pelvis w/ IV Cont (05.13.25 @ 18:16) >  *1. Since November 8, 2024, new moderate mural thickening and inflammation involving the proximal proximal duodenal wall, with focal   defect/outpouching along lateral duodenum, compatible with perforated duodenal ulcer; no focal drainable fluid collection.  2. Additional chronic/incidental findings, as detailed above.  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 which could result in poor clinical outcomes  #Abx allergy: No Known Allergies  #Solitary kidney/ NOLA Creatinine: 1.9 mg/dL (05-14-25 @ 22:15)    Height (cm): 167.6 (05-14-25 @ 00:10)  Weight (kg): 87 (05-14-25 @ 00:10)    RECOMMENDATIONS  - f/u ecoli sensitivities   - Continue Zosyn 3.375 q8h IV   - Continue caspofungin IVPB 50 milliGRAM(s) IV Intermittent every 24 hours  - -mg x1 then 100mg x 2 days  - ESR, CRP  - F/u OR path, no OR cx pending    If any questions, please send a message or call on [facility] Teams  Please continue to update ID with any pertinent new laboratory, radiographic findings, or change in clinical status

## 2025-05-15 NOTE — PROGRESS NOTE ADULT - ASSESSMENT
74F found to have perforated peptic ulcer now s/p ex lap, distal antrectomy, abthera placement requiring vasopressors.        PLAN:   - plan for RTOR 5/15 Ex lap, all indicated procedures  -Continue abx as per ID. Continue treatment for COVID.  -Still intubated and on pressors-managed by SICU  - Rest of care per SICU    5300

## 2025-05-15 NOTE — CONSULT NOTE ADULT - SUBJECTIVE AND OBJECTIVE BOX
Outpt cardiologist:    HPI:  Pt is 81F PMHx HTN, a-fib (on xarelto), solitary kidney, DM, GERD, prior open cholecystectomy c/b suture granulomas s/p  abdominal wall debridements by Dr. Banda, respiratory distress with prolonged ventilation during 2024 admission, s/p  tracheostomy and gastrostomy and feeding jejunostomy tube by Dr. Champion, ya catheter dependence who presented to the ED from NH on  with 4 days progressively worsening abdominal pain. Surgery consulted for imaging concerning for duodenal perforation. History limited by pt's minimally-verbal status. Spoke with daughter on phone, pt is full code at this time, (13 May 2025 21:02)      Cardiology HPI:  73 yo female PMH HTN, a-fib (on xarelto), solitary kidney, DM, GERD, prior open cholecystectomy c/b suture granulomas s/p  abdominal wall debridements by Dr. Banda, respiratory distress with prolonged ventilation during 2024 admission, s/p  tracheostomy and gastrostomy and feeding jejunostomy tube by Dr. Champion, ya catheter dependence who presented from the nursing home with abdominal pain and was admitted to SICU for perforation of prepyloric ulcer, now s/p ex-lap with antrectomy and D1 stapling. Currently in septic shock in the SICU on levo and vaso. Also has covid and positive Urine Culture. Being treated with zosyn, caspo and RDV. Cardio is consulted for concerning ECG with afib and PVCs and concern for VT overnight on tele. Patient is minimally verbal and a poor historian. Home cardiac meds are xarelto and losartan 100 which are currently being held.    PAST MEDICAL & SURGICAL HISTORY  HTN (hypertension)    Diabetes mellitus    Obesity    Gastroesophageal reflux disease    Active asthma    Generalized OA    Afib    H/O hernia repair   and revised in     History of cholecystectomy    Status post debridement    H/O tracheostomy    S/P gastrostomy    S/P jejunostomy        FAMILY HISTORY:  FAMILY HISTORY:      SOCIAL HISTORY:  Social History:      ALLERGIES:  No Known Allergies      MEDICATIONS:  acetaminophen   IVPB .. 1000 milliGRAM(s) IV Intermittent once  caspofungin IVPB 50 milliGRAM(s) IV Intermittent every 24 hours  caspofungin IVPB      chlorhexidine 0.12% Liquid 15 milliLiter(s) Oral Mucosa every 12 hours  chlorhexidine 2% Cloths 1 Application(s) Topical <User Schedule>  dexMEDEtomidine Infusion 0.2 MICROgram(s)/kG/Hr (4.35 mL/Hr) IV Continuous <Continuous>  heparin   Injectable 5000 Unit(s) SubCutaneous every 8 hours  lactated ringers. 1000 milliLiter(s) (110 mL/Hr) IV Continuous <Continuous>  norepinephrine Infusion 0.05 MICROgram(s)/kG/Min (4.08 mL/Hr) IV Continuous <Continuous>  nystatin Powder 1 Application(s) Topical two times a day  pantoprazole  Injectable 40 milliGRAM(s) IV Push every 12 hours  piperacillin/tazobactam IVPB.. 3.375 Gram(s) IV Intermittent every 8 hours  remdesivir  IVPB 100 milliGRAM(s) IV Intermittent every 24 hours  vasopressin Infusion. 0.03 Unit(s)/Min (4.5 mL/Hr) IV Continuous <Continuous>    PRN:  fentaNYL    Injectable 12.5 MICROGram(s) IV Push every 4 hours PRN  fentaNYL    Injectable 25 MICROGram(s) IV Push every 4 hours PRN  sodium chloride 0.9% lock flush 10 milliLiter(s) IV Push every 1 hour PRN      HOME MEDICATIONS:  Home Medications:  albuterol 2.5 mg/3 mL (0.083%) inhalation solution: 3 milliliter(s) by nebulizer every 6 hours as needed for  shortness of breath and/or wheezing (13 May 2025 22:28)  alendronate 70 mg oral tablet: 1 tab(s) orally once a week 5 AM on Thursday (13 May 2025 21:20)  cholecalciferol 400 intl units (10 mcg) oral tablet: 1 tab(s) orally once a day (13 May 2025 22:28)  ipratropium: 1 unit(s) by nebulizer every 6 hours (ipratropium bromide inhalation 0.02% solution) (13 May 2025 22:28)  losartan 100 mg oral tablet: 1 tab(s) orally once a day (13 May 2025 21:29)  Multiple Vitamins with Minerals oral liquid: 10 milliliter(s) by jejunostomy tube once a day (13 May 2025 21:29)  rivaroxaban 15 mg oral tablet: 1 tab(s) orally once a day (at bedtime) (13 May 2025 21:17)  Sensipar 30 mg oral tablet: 1 tab(s) orally every 12 hours (13 May 2025 22:28)      VITALS:   T(F): 99 (05-15 @ 12:00), Max: 99.1 (05-15 @ 04:00)  HR: 74 (05-15 @ 13:45) (60 - 103)  BP: 99/63 (05-15 @ 12:00) (79/45 - 153/67)  BP(mean): 75 (05-15 @ 12:00) (8 - 105)  RR: 18 (05-15 @ 00:00) (12 - 24)  SpO2: 100% (05-15 @ 13:45) (86% - 100%)    I&O's Summary    14 May 2025 07:  -  15 May 2025 07:00  --------------------------------------------------------  IN: 4422.5 mL / OUT: 1500 mL / NET: 2922.5 mL    15 May 2025 07:01  -  15 May 2025 14:46  --------------------------------------------------------  IN: 660.3 mL / OUT: 1225 mL / NET: -564.7 mL        REVIEW OF SYSTEMS: Minimally verbal    PHYSICAL EXAM:  General: on mechanical ventilation and precedex drip, minimally verbal, not communicating  HEENT: chronic trach  Cardio: regular, S1, S2, slight systolic murmur  Pulm: Clear air entry bilaterally, no wheezing, rales, rhonchi  Abdomen: s/p exlap w abthera  Extremities: No edema in bilateral LE  Neuro: A&OX0     LABS:                        9.4    22. )-----------( 407      ( 14 May 2025 22:15 )             29.3     05    138  |  104  |  44[H]  ----------------------------<  180[H]  4.7   |  19  |  1.9[H]    Ca    8.3[L]      14 May 2025 22:15  Phos  3.6       Mg     1.8         TPro  4.9[L]  /  Alb  2.7[L]  /  TBili  0.5  /  DBili  0.2  /  AST  20  /  ALT  16  /  AlkPhos  100      PT/INR - ( 14 May 2025 22:15 )   PT: 11.80 sec;   INR: 1.00 ratio         PTT - ( 14 May 2025 22:15 )  PTT:27.0 sec  Sedimentation Rate, Erythrocyte: 45 mm/hr *H* (25 @ 22:15)          Troponin trend: 143>84            RADIOLOGY:  -CXR: 5/15/24- No focal opacity. Blunting of the left costophrenic angle.  -TTE: 24- EF 71%, mild-mod AS    -OTHER:  EC Lead ECG:   Ventricular Rate 83 BPM    QRS Duration 104 ms    Q-T Interval 380 ms    QTC Calculation(Bazett) 446 ms    R Axis -22 degrees    T Axis 28 degrees    Diagnosis Line Atrial fibrillation  Low voltage QRS  Septal infarct , age undetermined  Abnormal ECG    Confirmed by Sanjeev Pierce ([id number]) on 2025 6:59:31 PM ( @ 23:32)      TELEMETRY EVENTS: rate-controlled Afib and PVCs   Outpt cardiologist:    HPI:  Pt is 81F PMHx HTN, a-fib (on xarelto), solitary kidney, DM, GERD, prior open cholecystectomy c/b suture granulomas s/p  abdominal wall debridements by Dr. Banda, respiratory distress with prolonged ventilation during 2024 admission, s/p  tracheostomy and gastrostomy and feeding jejunostomy tube by Dr. Champion, ya catheter dependence who presented to the ED from NH on  with 4 days progressively worsening abdominal pain. Surgery consulted for imaging concerning for duodenal perforation. History limited by pt's minimally-verbal status. Spoke with daughter on phone, pt is full code at this time, (13 May 2025 21:02)      Cardiology HPI:  75 yo female PMH HTN, a-fib (on xarelto), solitary kidney, DM, GERD, prior open cholecystectomy c/b suture granulomas s/p  abdominal wall debridements by Dr. Banda, respiratory distress with prolonged ventilation during 2024 admission, s/p  tracheostomy and gastrostomy and feeding jejunostomy tube by Dr. Champion, ya catheter dependence who presented from the nursing home with abdominal pain and was admitted to SICU for perforation of prepyloric ulcer, now s/p ex-lap with antrectomy and D1 stapling. Currently in septic shock in the SICU on levo and vaso. Also has covid and positive Urine Culture. Being treated with zosyn, caspo and RDV. Cardio is consulted for concerning ECG with afib and PVCs and concern for VT overnight on tele. Patient is minimally verbal and a poor historian. Home cardiac meds are xarelto and losartan 100 which are currently being held.    PAST MEDICAL & SURGICAL HISTORY  HTN (hypertension)    Diabetes mellitus    Obesity    Gastroesophageal reflux disease    Active asthma    Generalized OA    Afib    H/O hernia repair   and revised in     History of cholecystectomy    Status post debridement    H/O tracheostomy    S/P gastrostomy    S/P jejunostomy        FAMILY HISTORY:  FAMILY HISTORY:      SOCIAL HISTORY:  Social History:      ALLERGIES:  No Known Allergies      MEDICATIONS:  acetaminophen   IVPB .. 1000 milliGRAM(s) IV Intermittent once  caspofungin IVPB 50 milliGRAM(s) IV Intermittent every 24 hours  caspofungin IVPB      chlorhexidine 0.12% Liquid 15 milliLiter(s) Oral Mucosa every 12 hours  chlorhexidine 2% Cloths 1 Application(s) Topical <User Schedule>  dexMEDEtomidine Infusion 0.2 MICROgram(s)/kG/Hr (4.35 mL/Hr) IV Continuous <Continuous>  heparin   Injectable 5000 Unit(s) SubCutaneous every 8 hours  lactated ringers. 1000 milliLiter(s) (110 mL/Hr) IV Continuous <Continuous>  norepinephrine Infusion 0.05 MICROgram(s)/kG/Min (4.08 mL/Hr) IV Continuous <Continuous>  nystatin Powder 1 Application(s) Topical two times a day  pantoprazole  Injectable 40 milliGRAM(s) IV Push every 12 hours  piperacillin/tazobactam IVPB.. 3.375 Gram(s) IV Intermittent every 8 hours  remdesivir  IVPB 100 milliGRAM(s) IV Intermittent every 24 hours  vasopressin Infusion. 0.03 Unit(s)/Min (4.5 mL/Hr) IV Continuous <Continuous>    PRN:  fentaNYL    Injectable 12.5 MICROGram(s) IV Push every 4 hours PRN  fentaNYL    Injectable 25 MICROGram(s) IV Push every 4 hours PRN  sodium chloride 0.9% lock flush 10 milliLiter(s) IV Push every 1 hour PRN      HOME MEDICATIONS:  Home Medications:  albuterol 2.5 mg/3 mL (0.083%) inhalation solution: 3 milliliter(s) by nebulizer every 6 hours as needed for  shortness of breath and/or wheezing (13 May 2025 22:28)  alendronate 70 mg oral tablet: 1 tab(s) orally once a week 5 AM on Thursday (13 May 2025 21:20)  cholecalciferol 400 intl units (10 mcg) oral tablet: 1 tab(s) orally once a day (13 May 2025 22:28)  ipratropium: 1 unit(s) by nebulizer every 6 hours (ipratropium bromide inhalation 0.02% solution) (13 May 2025 22:28)  losartan 100 mg oral tablet: 1 tab(s) orally once a day (13 May 2025 21:29)  Multiple Vitamins with Minerals oral liquid: 10 milliliter(s) by jejunostomy tube once a day (13 May 2025 21:29)  rivaroxaban 15 mg oral tablet: 1 tab(s) orally once a day (at bedtime) (13 May 2025 21:17)  Sensipar 30 mg oral tablet: 1 tab(s) orally every 12 hours (13 May 2025 22:28)      VITALS:   T(F): 99 (05-15 @ 12:00), Max: 99.1 (05-15 @ 04:00)  HR: 74 (05-15 @ 13:45) (60 - 103)  BP: 99/63 (05-15 @ 12:00) (79/45 - 153/67)  BP(mean): 75 (05-15 @ 12:00) (8 - 105)  RR: 18 (05-15 @ 00:00) (12 - 24)  SpO2: 100% (05-15 @ 13:45) (86% - 100%)    I&O's Summary    14 May 2025 07:  -  15 May 2025 07:00  --------------------------------------------------------  IN: 4422.5 mL / OUT: 1500 mL / NET: 2922.5 mL    15 May 2025 07:01  -  15 May 2025 14:46  --------------------------------------------------------  IN: 660.3 mL / OUT: 1225 mL / NET: -564.7 mL        REVIEW OF SYSTEMS: Minimally verbal    PHYSICAL EXAM:  General: on mechanical ventilation and precedex drip, minimally verbal, not communicating  HEENT: chronic trach  Cardio: regular, S1, S2, slight systolic murmur  Pulm: Clear air entry bilaterally, no wheezing, rales, rhonchi  Abdomen: s/p exlap w abthera  Extremities: No edema in bilateral LE  Neuro: A&OX0     LABS:                        9.4    22. )-----------( 407      ( 14 May 2025 22:15 )             29.3     05    138  |  104  |  44[H]  ----------------------------<  180[H]  4.7   |  19  |  1.9[H]    Ca    8.3[L]      14 May 2025 22:15  Phos  3.6       Mg     1.8         TPro  4.9[L]  /  Alb  2.7[L]  /  TBili  0.5  /  DBili  0.2  /  AST  20  /  ALT  16  /  AlkPhos  100      PT/INR - ( 14 May 2025 22:15 )   PT: 11.80 sec;   INR: 1.00 ratio         PTT - ( 14 May 2025 22:15 )  PTT:27.0 sec  Sedimentation Rate, Erythrocyte: 45 mm/hr *H* (25 @ 22:15)          Troponin trend: 143>84            RADIOLOGY:  -CXR: 5/15/24- No focal opacity. Blunting of the left costophrenic angle.  -TTE: 24- EF 71%, mild-mod AS  -TTE: 5/15/25- EF 30-35%, mild TR,  entire apex, mid and apical anterior wall, mid and apical anterior septum, mid and apical inferior septum, mid and apical inferior wall, mid inferolateral segment, and mid anterolateral segment are akinetic.    -OTHER:  EC Lead ECG:   Ventricular Rate 83 BPM    QRS Duration 104 ms    Q-T Interval 380 ms    QTC Calculation(Bazett) 446 ms    R Axis -22 degrees    T Axis 28 degrees    Diagnosis Line Atrial fibrillation  Low voltage QRS  Septal infarct , age undetermined  Abnormal ECG    Confirmed by Sanjeev Pierce ([id number]) on 2025 6:59:31 PM ( @ 23:32)      TELEMETRY EVENTS: rate-controlled Afib and PVCs, 2 episodes of NSVT   Outpt cardiologist:    HPI:    Pt is 74F PMHx HTN, a-fib (on xarelto), solitary kidney, DM, GERD, prior open cholecystectomy c/b suture granulomas s/p  abdominal wall debridements by Dr. Banda, respiratory distress with prolonged ventilation during 2024 admission, s/p  tracheostomy and gastrostomy and feeding jejunostomy tube by Dr. Champion, ya catheter dependence who presented to the ED from NH on  with 4 days progressively worsening abdominal pain. Surgery consulted for imaging concerning for duodenal perforation. History limited by pt's minimally-verbal status. Spoke with daughter on phone, pt is full code at this time, (13 May 2025 21:02)      Cardiology HPI:  75 yo female PMH HTN, a-fib (on xarelto), solitary kidney, DM, GERD, prior open cholecystectomy c/b suture granulomas s/p  abdominal wall debridements by Dr. Banda, respiratory distress with prolonged ventilation during 2024 admission, s/p  tracheostomy and gastrostomy and feeding jejunostomy tube by Dr. Champion, ya catheter dependence who presented from the nursing home with abdominal pain and was admitted to SICU for perforation of prepyloric ulcer, now s/p ex-lap with antrectomy and D1 stapling. Currently in septic shock in the SICU on levo and vaso. Also has covid and positive Urine Culture. Being treated with zosyn, caspo and RDV. Cardio is consulted for concerning ECG with afib and PVCs and concern for VT overnight on tele. Patient is minimally verbal and a poor historian. Home cardiac meds are xarelto and losartan 100 which are currently being held.    PAST MEDICAL & SURGICAL HISTORY  HTN (hypertension)    Diabetes mellitus    Obesity    Gastroesophageal reflux disease    Active asthma    Generalized OA    Afib    H/O hernia repair   and revised in     History of cholecystectomy    Status post debridement    H/O tracheostomy    S/P gastrostomy    S/P jejunostomy        FAMILY HISTORY:  FAMILY HISTORY:  NC    SOCIAL HISTORY:  Social History:  NC    ALLERGIES:  No Known Allergies      MEDICATIONS:  acetaminophen   IVPB .. 1000 milliGRAM(s) IV Intermittent once  caspofungin IVPB 50 milliGRAM(s) IV Intermittent every 24 hours  caspofungin IVPB      chlorhexidine 0.12% Liquid 15 milliLiter(s) Oral Mucosa every 12 hours  chlorhexidine 2% Cloths 1 Application(s) Topical <User Schedule>  dexMEDEtomidine Infusion 0.2 MICROgram(s)/kG/Hr (4.35 mL/Hr) IV Continuous <Continuous>  heparin   Injectable 5000 Unit(s) SubCutaneous every 8 hours  lactated ringers. 1000 milliLiter(s) (110 mL/Hr) IV Continuous <Continuous>  norepinephrine Infusion 0.05 MICROgram(s)/kG/Min (4.08 mL/Hr) IV Continuous <Continuous>  nystatin Powder 1 Application(s) Topical two times a day  pantoprazole  Injectable 40 milliGRAM(s) IV Push every 12 hours  piperacillin/tazobactam IVPB.. 3.375 Gram(s) IV Intermittent every 8 hours  remdesivir  IVPB 100 milliGRAM(s) IV Intermittent every 24 hours  vasopressin Infusion. 0.03 Unit(s)/Min (4.5 mL/Hr) IV Continuous <Continuous>    PRN:  fentaNYL    Injectable 12.5 MICROGram(s) IV Push every 4 hours PRN  fentaNYL    Injectable 25 MICROGram(s) IV Push every 4 hours PRN  sodium chloride 0.9% lock flush 10 milliLiter(s) IV Push every 1 hour PRN      HOME MEDICATIONS:  Home Medications:  albuterol 2.5 mg/3 mL (0.083%) inhalation solution: 3 milliliter(s) by nebulizer every 6 hours as needed for  shortness of breath and/or wheezing (13 May 2025 22:28)  alendronate 70 mg oral tablet: 1 tab(s) orally once a week 5 AM on Thursday (13 May 2025 21:20)  cholecalciferol 400 intl units (10 mcg) oral tablet: 1 tab(s) orally once a day (13 May 2025 22:28)  ipratropium: 1 unit(s) by nebulizer every 6 hours (ipratropium bromide inhalation 0.02% solution) (13 May 2025 22:28)  losartan 100 mg oral tablet: 1 tab(s) orally once a day (13 May 2025 21:29)  Multiple Vitamins with Minerals oral liquid: 10 milliliter(s) by jejunostomy tube once a day (13 May 2025 21:29)  rivaroxaban 15 mg oral tablet: 1 tab(s) orally once a day (at bedtime) (13 May 2025 21:17)  Sensipar 30 mg oral tablet: 1 tab(s) orally every 12 hours (13 May 2025 22:28)      VITALS:   T(F): 99 (05-15 @ 12:00), Max: 99.1 (05-15 @ 04:00)  HR: 74 (05-15 @ 13:45) (60 - 103)  BP: 99/63 (05-15 @ 12:00) (79/45 - 153/67)  BP(mean): 75 (05-15 @ 12:00) (8 - 105)  RR: 18 (05-15 @ 00:00) (12 - 24)  SpO2: 100% (05-15 @ 13:45) (86% - 100%)    I&O's Summary    14 May 2025 07:01  -  15 May 2025 07:00  --------------------------------------------------------  IN: 4422.5 mL / OUT: 1500 mL / NET: 2922.5 mL    15 May 2025 07:01  -  15 May 2025 14:46  --------------------------------------------------------  IN: 660.3 mL / OUT: 1225 mL / NET: -564.7 mL        REVIEW OF SYSTEMS: Minimally verbal    PHYSICAL EXAM:  General: on mechanical ventilation and precedex drip, minimally verbal, not communicating  HEENT: chronic trach  Cardio: irregular, S1, S2, slight systolic murmur  Pulm: B/L vent sounds  Abdomen: s/p exlap w abthera  Extremities: No edema in bilateral LE  Neuro: A&OX0     LABS:                        9.4    22.19 )-----------( 407      ( 14 May 2025 22:15 )             29.3         138  |  104  |  44[H]  ----------------------------<  180[H]  4.7   |  19  |  1.9[H]    Ca    8.3[L]      14 May 2025 22:15  Phos  3.6       Mg     1.8         TPro  4.9[L]  /  Alb  2.7[L]  /  TBili  0.5  /  DBili  0.2  /  AST  20  /  ALT  16  /  AlkPhos  100      PT/INR - ( 14 May 2025 22:15 )   PT: 11.80 sec;   INR: 1.00 ratio         PTT - ( 14 May 2025 22:15 )  PTT:27.0 sec  Sedimentation Rate, Erythrocyte: 45 mm/hr *H* (25 @ 22:15)          Troponin trend: 143>84            RADIOLOGY:  -CXR: 5/15/24- No focal opacity. Blunting of the left costophrenic angle.  -TTE: 24- EF 71%, mild-mod AS  -TTE: 5/15/25- EF 30-35%, mild TR,  entire apex, mid and apical anterior wall, mid and apical anterior septum, mid and apical inferior septum, mid and apical inferior wall, mid inferolateral segment, and mid anterolateral segment are akinetic.    -OTHER:  EC Lead ECG:   Ventricular Rate 83 BPM    QRS Duration 104 ms    Q-T Interval 380 ms    QTC Calculation(Bazett) 446 ms    R Axis -22 degrees    T Axis 28 degrees    Diagnosis Line Atrial fibrillation  Low voltage QRS  Septal infarct , age undetermined  Abnormal ECG    Confirmed by Sanjeev Pierce ([id number]) on 2025 6:59:31 PM ( @ 23:32)      TELEMETRY EVENTS: rate-controlled Afib and PVCs, 2 episodes of NSVT

## 2025-05-15 NOTE — PROGRESS NOTE ADULT - SUBJECTIVE AND OBJECTIVE BOX
PATRICIA SPRAGUE   [id number]   [birth year]    74yF  ============================================================   DATE OF INITIAL SICU/SDU CONSULT: 05-13-25    INDICATION FOR SICU CONSULT:  perforated duodenal ulcer      SICU COURSE EVENTS :  05-13 - admitted to SICU service  05-14 - s/p OR for exploratory laparotomy, started remdesivir for covid +, echo ordered     24HOUR EVENTS  -Admission under SICU service    [X] A ten-point review of systems was negative except as expressed in note.  [X] History was obtained from patient. If unable to participate in their care, history obtained from review of the chart and collateral sources of information.  ============================================================    PATRICIA SPRAGUE   [id number]   [birth year]    74yF  ============================================================   DATE OF INITIAL SICU/SDU CONSULT: 25    INDICATION FOR SICU CONSULT:  perforated duodenal ulcer      SICU COURSE EVENTS :   - admitted to SICU service   - s/p OR for exploratory laparotomy, started remdesivir for covid +, echo ordered     24HOUR EVENTS  -Admission under SICU service    [X] A ten-point review of systems was negative except as expressed in note.  [X] History was obtained from patient. If unable to participate in their care, history obtained from review of the chart and collateral sources of information.  ============================================================   Daily     Daily Weight in k (15 May 2025 05:30)    Diet, NPO (25 @ 22:39)      CURRENT MEDS:  Neurologic Medications  acetaminophen   IVPB .. 1000 milliGRAM(s) IV Intermittent once  dexMEDEtomidine Infusion 0.2 MICROgram(s)/kG/Hr IV Continuous <Continuous>  fentaNYL    Injectable 12.5 MICROGram(s) IV Push every 4 hours PRN Moderate Pain (4 - 6)  fentaNYL    Injectable 25 MICROGram(s) IV Push every 4 hours PRN Severe Pain (7 - 10)    Respiratory Medications    Cardiovascular Medications  norepinephrine Infusion 0.05 MICROgram(s)/kG/Min IV Continuous <Continuous>    Gastrointestinal Medications  lactated ringers. 1000 milliLiter(s) IV Continuous <Continuous>  pantoprazole  Injectable 40 milliGRAM(s) IV Push every 12 hours  sodium chloride 0.9% lock flush 10 milliLiter(s) IV Push every 1 hour PRN Pre/post blood products, medications, blood draw, and to maintain line patency    Genitourinary Medications    Hematologic/Oncologic Medications  heparin   Injectable 5000 Unit(s) SubCutaneous every 8 hours    Antimicrobial/Immunologic Medications  caspofungin IVPB 50 milliGRAM(s) IV Intermittent every 24 hours  caspofungin IVPB      piperacillin/tazobactam IVPB.. 3.375 Gram(s) IV Intermittent every 8 hours  remdesivir  IVPB 100 milliGRAM(s) IV Intermittent every 24 hours    Endocrine/Metabolic Medications  vasopressin Infusion. 0.03 Unit(s)/Min IV Continuous <Continuous>    Topical/Other Medications  chlorhexidine 0.12% Liquid 15 milliLiter(s) Oral Mucosa every 12 hours  chlorhexidine 2% Cloths 1 Application(s) Topical <User Schedule>  nystatin Powder 1 Application(s) Topical two times a day      ICU Vital Signs Last 24 Hrs  T(C): 37.1 (15 May 2025 16:00), Max: 37.3 (15 May 2025 04:00)  T(F): 98.8 (15 May 2025 16:00), Max: 99.1 (15 May 2025 04:00)  HR: 69 (15 May 2025 17:00) (67 - 86)  BP: 124/57 (15 May 2025 17:00) (90/66 - 153/67)  BP(mean): 82 (15 May 2025 17:00) (66 - 105)  ABP: 132/58 (15 May 2025 17:00) (63/63 - 147/73)  ABP(mean): 77 (15 May 2025 17:00) (56 - 122)  RR: 18 (15 May 2025 00:00) (18 - 18)  SpO2: 100% (15 May 2025 17:00) (99% - 100%)    O2 Parameters below as of 15 May 2025 00:00  Patient On (Oxygen Delivery Method): ventilator    O2 Concentration (%): 40      Mode: AC/ CMV (Assist Control/ Continuous Mandatory Ventilation)  RR (machine): 16  TV (machine): 450  FiO2: 30  PEEP: 10  ITime: 0.8  MAP: 14  PIP: 24    ABG - ( 15 May 2025 05:05 )  pH, Arterial: 7.42  pH, Blood: x     /  pCO2: 29    /  pO2: 90    / HCO3: 19    / Base Excess: -4.9  /  SaO2: 98.4      I&O's Summary    14 May 2025 07:01  -  15 May 2025 07:00  --------------------------------------------------------  IN: 4422.5 mL / OUT: 1500 mL / NET: 2922.5 mL    15 May 2025 07:01  -  15 May 2025 18:00  --------------------------------------------------------  IN: 1452.3 mL / OUT: 1685 mL / NET: -232.7 mL      I&O's Detail    14 May 2025 07:01  -  15 May 2025 07:00  --------------------------------------------------------  IN:    Dexmedetomidine: 30.8 mL    Dexmedetomidine: 139.3 mL    IV PiggyBack: 200 mL    Lactated Ringers: 1630 mL    Lactated Ringers Bolus: 500 mL    Norepinephrine: 420.9 mL    Norepinephrine: 620.5 mL    Sodium Bicarbonate: 100 mL    Sodium Bicarbonate: 100 mL    Sodium Bicarbonate: 300 mL    Sodium Bicarbonate: 300 mL    Vasopressin (Organ Donation): 81 mL  Total IN: 4422.5 mL    OUT:    Drain (mL): 0 mL    Nasogastric/Oral tube (mL): 0 mL    Ureteral Catheter (mL): 1500 mL    VAC (Vacuum Assisted Closure) System (mL): 0 mL  Total OUT: 1500 mL    Total NET: 2922.5 mL      15 May 2025 07:01  -  15 May 2025 18:00  --------------------------------------------------------  IN:    Dexmedetomidine: 67.2 mL    Lactated Ringers: 1210 mL    Norepinephrine: 125.6 mL    Vasopressin (Organ Donation): 49.5 mL  Total IN: 1452.3 mL    OUT:    Drain (mL): 0 mL    Nasogastric/Oral tube (mL): 0 mL    Ureteral Catheter (mL): 1685 mL    VAC (Vacuum Assisted Closure) System (mL): 0 mL  Total OUT: 1685 mL    Total NET: -232.7 mL      PHYSICAL EXAM:    General/Neuro  RASS:             GCS:   11T  = E   / V   / M      Deficits:  awake and alert, follows commands. No neuro deficits.    Lungs:   clear to auscultation, Normal expansion/effort.     Cardiovascular : S1, S2.  Irregular rate and rhythm.  Peripheral edema in B/L LE   Cardiac Rhythm: Afib    GI: Abdomen soft, Non-tender, Non-distended.    Gastrostomy / Jejunostomy tube in place.  Nasogastric tube in place.    Abthera in midline incision    Extremities: Extremities warm, pink, well-perfused.    Derm: Good skin turgor, no skin breakdown.      :   Burger catheter in place.        LABS:  POCT Blood Glucose.: 133 mg/dL (15 May 2025 15:04)  POCT Blood Glucose.: 135 mg/dL (15 May 2025 05:50)  POCT Blood Glucose.: 200 mg/dL (14 May 2025 22:01)                          9.4    22.19 )-----------( 407      ( 14 May 2025 22:15 )             29.3       05-14    138  |  104  |  44[H]  ----------------------------<  180[H]  4.7   |  19  |  1.9[H]    Ca    8.3[L]      14 May 2025 22:15  Phos  3.6     05-14  Mg     1.8     -14    TPro  4.9[L]  /  Alb  2.7[L]  /  TBili  0.5  /  DBili  0.2  /  AST  20  /  ALT  16  /  AlkPhos  100  05-14      PT/INR - ( 14 May 2025 22:15 )   PT: 11.80 sec;   INR: 1.00 ratio         PTT - ( 14 May 2025 22:15 )  PTT:27.0 sec      Urinalysis Basic - ( 14 May 2025 22:15 )    Color: x / Appearance: x / SG: x / pH: x  Gluc: 180 mg/dL / Ketone: x  / Bili: x / Urobili: x   Blood: x / Protein: x / Nitrite: x   Leuk Esterase: x / RBC: x / WBC x   Sq Epi: x / Non Sq Epi: x / Bacteria: x    Urinalysis with Rflx Culture (collected 13 May 2025 17:22)    Culture - Urine (collected 13 May 2025 17:22)  Source: Catheterized None  Preliminary Report (15 May 2025 00:33):    >100,000 CFU/ml Escherichia coli    Culture - Blood (collected 13 May 2025 15:45)  Source: Blood Blood-Peripheral  Preliminary Report (14 May 2025 23:09):    No growth at 24 hours    Culture - Blood (collected 13 May 2025 15:35)  Source: Blood Blood-Peripheral  Preliminary Report (14 May 2025 23:09):    No growth at 24 hours

## 2025-05-15 NOTE — PROGRESS NOTE ADULT - SUBJECTIVE AND OBJECTIVE BOX
seen and examined  24 h events noted       PAST HISTORY  --------------------------------------------------------------------------------  No significant changes to PMH, PSH, FHx, SHx, unless otherwise noted    ALLERGIES & MEDICATIONS  --------------------------------------------------------------------------------  Allergies    No Known Allergies    Intolerances      Standing Inpatient Medications  acetaminophen   IVPB .. 1000 milliGRAM(s) IV Intermittent once  caspofungin IVPB 50 milliGRAM(s) IV Intermittent every 24 hours  caspofungin IVPB      chlorhexidine 0.12% Liquid 15 milliLiter(s) Oral Mucosa every 12 hours  chlorhexidine 2% Cloths 1 Application(s) Topical <User Schedule>  dexMEDEtomidine Infusion 0.2 MICROgram(s)/kG/Hr IV Continuous <Continuous>  heparin   Injectable 5000 Unit(s) SubCutaneous every 8 hours  lactated ringers. 1000 milliLiter(s) IV Continuous <Continuous>  norepinephrine Infusion 0.05 MICROgram(s)/kG/Min IV Continuous <Continuous>  pantoprazole  Injectable 40 milliGRAM(s) IV Push every 12 hours  piperacillin/tazobactam IVPB.. 3.375 Gram(s) IV Intermittent every 8 hours  remdesivir  IVPB 100 milliGRAM(s) IV Intermittent every 24 hours  vasopressin Infusion. 0.03 Unit(s)/Min IV Continuous <Continuous>    PRN Inpatient Medications  fentaNYL    Injectable 12.5 MICROGram(s) IV Push every 4 hours PRN  fentaNYL    Injectable 25 MICROGram(s) IV Push every 4 hours PRN  sodium chloride 0.9% lock flush 10 milliLiter(s) IV Push every 1 hour PRN          VITALS/PHYSICAL EXAM  --------------------------------------------------------------------------------  T(C): 37.3 (05-15-25 @ 08:00), Max: 37.3 (05-15-25 @ 04:00)  HR: 80 (05-15-25 @ 09:00) (60 - 86)  BP: 115/71 (05-15-25 @ 09:00) (90/66 - 153/67)  RR: 18 (05-15-25 @ 00:00) (16 - 19)  SpO2: 100% (05-15-25 @ 09:00) (99% - 100%)  Wt(kg): --  Height (cm): 167.6 (05-14-25 @ 00:10)  Weight (kg): 87 (05-14-25 @ 00:10)  BMI (kg/m2): 31 (05-14-25 @ 00:10)  BSA (m2): 1.96 (05-14-25 @ 00:10)      05-14-25 @ 07:01  -  05-15-25 @ 07:00  --------------------------------------------------------  IN: 4422.5 mL / OUT: 1500 mL / NET: 2922.5 mL    05-15-25 @ 07:01  -  05-15-25 @ 10:38  --------------------------------------------------------  IN: 253.2 mL / OUT: 875 mL / NET: -621.8 mL    LABS/STUDIES  --------------------------------------------------------------------------------              9.4    22.19 >-----------<  407      [05-14-25 @ 22:15]              29.3     138  |  104  |  44  ----------------------------<  180      [05-14-25 @ 22:15]  4.7   |  19  |  1.9        Ca     8.3     [05-14-25 @ 22:15]      Mg     1.8     [05-14-25 @ 22:15]      Phos  3.6     [05-14-25 @ 22:15]    TPro  4.9  /  Alb  2.7  /  TBili  0.5  /  DBili  0.2  /  AST  20  /  ALT  16  /  AlkPhos  100  [05-14-25 @ 22:15]    PT/INR: PT 11.80, INR 1.00       [05-14-25 @ 22:15]  PTT: 27.0       [05-14-25 @ 22:15]      Creatinine Trend:  SCr 1.9 [05-14 @ 22:15]  SCr 2.4 [05-14 @ 04:10]  SCr 3.9 [05-13 @ 15:45]  SCr 0.6 [04-18 @ 06:02]  SCr 0.6 [04-17 @ 22:34]    Urinalysis - [05-14-25 @ 22:15]      Color  / Appearance  / SG  / pH       Gluc 180 / Ketone   / Bili  / Urobili        Blood  / Protein  / Leuk Est  / Nitrite       RBC  / WBC  / Hyaline  / Gran  / Sq Epi  / Non Sq Epi  / Bacteria       PTH -- (Ca --)      [04-11-25 @ 10:15]   195  PTH -- (Ca 12.6)      [04-10-25 @ 10:32]   132  Vitamin D (25OH) 30      [04-10-25 @ 10:32]  TSH 0.63      [04-16-25 @ 09:40]  Lipid: chol 133, , HDL 34, LDL --      [11-20-24 @ 04:32]

## 2025-05-16 LAB
ALBUMIN SERPL ELPH-MCNC: 2.3 G/DL — LOW (ref 3.5–5.2)
ALP SERPL-CCNC: 75 U/L — SIGNIFICANT CHANGE UP (ref 30–115)
ALT FLD-CCNC: 9 U/L — SIGNIFICANT CHANGE UP (ref 0–41)
ANION GAP SERPL CALC-SCNC: 12 MMOL/L — SIGNIFICANT CHANGE UP (ref 7–14)
AST SERPL-CCNC: 11 U/L — SIGNIFICANT CHANGE UP (ref 0–41)
BASOPHILS # BLD AUTO: 0.01 K/UL — SIGNIFICANT CHANGE UP (ref 0–0.2)
BASOPHILS NFR BLD AUTO: 0.1 % — SIGNIFICANT CHANGE UP (ref 0–1)
BILIRUB DIRECT SERPL-MCNC: 0.2 MG/DL — SIGNIFICANT CHANGE UP (ref 0–0.3)
BILIRUB INDIRECT FLD-MCNC: 0.1 MG/DL — LOW (ref 0.2–1.2)
BILIRUB SERPL-MCNC: 0.3 MG/DL — SIGNIFICANT CHANGE UP (ref 0.2–1.2)
BUN SERPL-MCNC: 29 MG/DL — HIGH (ref 10–20)
CALCIUM SERPL-MCNC: 10.2 MG/DL — SIGNIFICANT CHANGE UP (ref 8.4–10.5)
CHLORIDE SERPL-SCNC: 108 MMOL/L — SIGNIFICANT CHANGE UP (ref 98–110)
CO2 SERPL-SCNC: 20 MMOL/L — SIGNIFICANT CHANGE UP (ref 17–32)
CREAT SERPL-MCNC: 1.1 MG/DL — SIGNIFICANT CHANGE UP (ref 0.7–1.5)
EGFR: 53 ML/MIN/1.73M2 — LOW
EGFR: 53 ML/MIN/1.73M2 — LOW
EOSINOPHIL # BLD AUTO: 0 K/UL — SIGNIFICANT CHANGE UP (ref 0–0.7)
EOSINOPHIL NFR BLD AUTO: 0 % — SIGNIFICANT CHANGE UP (ref 0–8)
GAS PNL BLDA: SIGNIFICANT CHANGE UP
GAS PNL BLDA: SIGNIFICANT CHANGE UP
GAS PNL BLDV: SIGNIFICANT CHANGE UP
GLUCOSE SERPL-MCNC: 134 MG/DL — HIGH (ref 70–99)
HCT VFR BLD CALC: 23.9 % — LOW (ref 37–47)
HGB BLD-MCNC: 7.7 G/DL — LOW (ref 12–16)
IMM GRANULOCYTES NFR BLD AUTO: 0.9 % — HIGH (ref 0.1–0.3)
LYMPHOCYTES # BLD AUTO: 1.01 K/UL — LOW (ref 1.2–3.4)
LYMPHOCYTES # BLD AUTO: 7.7 % — LOW (ref 20.5–51.1)
MAGNESIUM SERPL-MCNC: 1.6 MG/DL — LOW (ref 1.8–2.4)
MCHC RBC-ENTMCNC: 28.1 PG — SIGNIFICANT CHANGE UP (ref 27–31)
MCHC RBC-ENTMCNC: 32.2 G/DL — SIGNIFICANT CHANGE UP (ref 32–37)
MCV RBC AUTO: 87.2 FL — SIGNIFICANT CHANGE UP (ref 81–99)
MONOCYTES # BLD AUTO: 0.84 K/UL — HIGH (ref 0.1–0.6)
MONOCYTES NFR BLD AUTO: 6.4 % — SIGNIFICANT CHANGE UP (ref 1.7–9.3)
NEUTROPHILS # BLD AUTO: 11.15 K/UL — HIGH (ref 1.4–6.5)
NEUTROPHILS NFR BLD AUTO: 84.9 % — HIGH (ref 42.2–75.2)
NRBC BLD AUTO-RTO: 0 /100 WBCS — SIGNIFICANT CHANGE UP (ref 0–0)
PHOSPHATE SERPL-MCNC: 3.6 MG/DL — SIGNIFICANT CHANGE UP (ref 2.1–4.9)
PLATELET # BLD AUTO: 240 K/UL — SIGNIFICANT CHANGE UP (ref 130–400)
PMV BLD: 10.5 FL — HIGH (ref 7.4–10.4)
POTASSIUM SERPL-MCNC: 3.3 MMOL/L — LOW (ref 3.5–5)
POTASSIUM SERPL-SCNC: 3.3 MMOL/L — LOW (ref 3.5–5)
PROT SERPL-MCNC: 4 G/DL — LOW (ref 6–8)
RBC # BLD: 2.74 M/UL — LOW (ref 4.2–5.4)
RBC # FLD: 15.2 % — HIGH (ref 11.5–14.5)
SODIUM SERPL-SCNC: 140 MMOL/L — SIGNIFICANT CHANGE UP (ref 135–146)
WBC # BLD: 13.13 K/UL — HIGH (ref 4.8–10.8)
WBC # FLD AUTO: 13.13 K/UL — HIGH (ref 4.8–10.8)

## 2025-05-16 PROCEDURE — 71045 X-RAY EXAM CHEST 1 VIEW: CPT | Mod: 26,77

## 2025-05-16 PROCEDURE — 49452 REPLACE G-J TUBE PERC: CPT | Mod: 58

## 2025-05-16 PROCEDURE — 43820 GASTROJEJUNOSTOMY WO VAGOTMY: CPT | Mod: 58

## 2025-05-16 PROCEDURE — 71045 X-RAY EXAM CHEST 1 VIEW: CPT | Mod: 26

## 2025-05-16 PROCEDURE — 74018 RADEX ABDOMEN 1 VIEW: CPT | Mod: 26

## 2025-05-16 PROCEDURE — 76000 FLUOROSCOPY <1 HR PHYS/QHP: CPT | Mod: 26

## 2025-05-16 PROCEDURE — 99291 CRITICAL CARE FIRST HOUR: CPT

## 2025-05-16 PROCEDURE — 88300 SURGICAL PATH GROSS: CPT | Mod: 26

## 2025-05-16 PROCEDURE — 99291 CRITICAL CARE FIRST HOUR: CPT | Mod: 25

## 2025-05-16 RX ORDER — IPRATROPIUM BROMIDE AND ALBUTEROL SULFATE .5; 2.5 MG/3ML; MG/3ML
3 SOLUTION RESPIRATORY (INHALATION) EVERY 6 HOURS
Refills: 0 | Status: DISCONTINUED | OUTPATIENT
Start: 2025-05-16 | End: 2025-05-16

## 2025-05-16 RX ORDER — ALBUTEROL SULFATE 2.5 MG/3ML
2 VIAL, NEBULIZER (ML) INHALATION EVERY 6 HOURS
Refills: 0 | Status: DISCONTINUED | OUTPATIENT
Start: 2025-05-16 | End: 2025-05-26

## 2025-05-16 RX ORDER — ALBUTEROL SULFATE 2.5 MG/3ML
2 VIAL, NEBULIZER (ML) INHALATION EVERY 6 HOURS
Refills: 0 | Status: COMPLETED | OUTPATIENT
Start: 2025-05-16 | End: 2026-04-14

## 2025-05-16 RX ORDER — MAGNESIUM SULFATE 500 MG/ML
2 SYRINGE (ML) INJECTION
Refills: 0 | Status: COMPLETED | OUTPATIENT
Start: 2025-05-16 | End: 2025-05-16

## 2025-05-16 RX ORDER — MEROPENEM 1 G/30ML
INJECTION INTRAVENOUS
Refills: 0 | Status: DISCONTINUED | OUTPATIENT
Start: 2025-05-16 | End: 2025-05-19

## 2025-05-16 RX ORDER — CINACALCET 30 MG/1
30 TABLET, FILM COATED ORAL EVERY 12 HOURS
Refills: 0 | Status: DISCONTINUED | OUTPATIENT
Start: 2025-05-16 | End: 2025-05-16

## 2025-05-16 RX ORDER — MEROPENEM 1 G/30ML
1000 INJECTION INTRAVENOUS ONCE
Refills: 0 | Status: COMPLETED | OUTPATIENT
Start: 2025-05-16 | End: 2025-05-16

## 2025-05-16 RX ORDER — MEROPENEM 1 G/30ML
1000 INJECTION INTRAVENOUS EVERY 12 HOURS
Refills: 0 | Status: DISCONTINUED | OUTPATIENT
Start: 2025-05-17 | End: 2025-05-19

## 2025-05-16 RX ORDER — IPRATROPIUM BROMIDE AND ALBUTEROL SULFATE .5; 2.5 MG/3ML; MG/3ML
3 SOLUTION RESPIRATORY (INHALATION) ONCE
Refills: 0 | Status: DISCONTINUED | OUTPATIENT
Start: 2025-05-16 | End: 2025-05-16

## 2025-05-16 RX ORDER — ALBUMIN (HUMAN) 12.5 G/50ML
50 INJECTION, SOLUTION INTRAVENOUS ONCE
Refills: 0 | Status: COMPLETED | OUTPATIENT
Start: 2025-05-16 | End: 2025-05-16

## 2025-05-16 RX ADMIN — NYSTATIN 1 APPLICATION(S): 100000 CREAM TOPICAL at 18:57

## 2025-05-16 RX ADMIN — Medication 2 PUFF(S): at 15:24

## 2025-05-16 RX ADMIN — Medication 25 GRAM(S): at 05:01

## 2025-05-16 RX ADMIN — Medication 100 MILLIEQUIVALENT(S): at 21:13

## 2025-05-16 RX ADMIN — Medication 40 MILLIGRAM(S): at 18:57

## 2025-05-16 RX ADMIN — HEPARIN SODIUM 5000 UNIT(S): 1000 INJECTION INTRAVENOUS; SUBCUTANEOUS at 13:10

## 2025-05-16 RX ADMIN — Medication 15 MILLILITER(S): at 05:01

## 2025-05-16 RX ADMIN — MEROPENEM 100 MILLIGRAM(S): 1 INJECTION INTRAVENOUS at 15:17

## 2025-05-16 RX ADMIN — Medication 40 MILLIGRAM(S): at 05:01

## 2025-05-16 RX ADMIN — Medication 1 PUFF(S): at 15:18

## 2025-05-16 RX ADMIN — REMDESIVIR 200 MILLIGRAM(S): 5 INJECTION INTRAVENOUS at 13:19

## 2025-05-16 RX ADMIN — Medication 25 MICROGRAM(S): at 13:11

## 2025-05-16 RX ADMIN — HEPARIN SODIUM 5000 UNIT(S): 1000 INJECTION INTRAVENOUS; SUBCUTANEOUS at 05:01

## 2025-05-16 RX ADMIN — Medication 25 MICROGRAM(S): at 08:38

## 2025-05-16 RX ADMIN — Medication 1 PUFF(S): at 19:29

## 2025-05-16 RX ADMIN — Medication 25 MICROGRAM(S): at 13:26

## 2025-05-16 RX ADMIN — Medication 25 GRAM(S): at 20:20

## 2025-05-16 RX ADMIN — Medication 1 APPLICATION(S): at 05:33

## 2025-05-16 RX ADMIN — Medication 25 GRAM(S): at 22:01

## 2025-05-16 RX ADMIN — Medication 2 PUFF(S): at 19:28

## 2025-05-16 RX ADMIN — Medication 25 MICROGRAM(S): at 08:23

## 2025-05-16 RX ADMIN — ALBUMIN (HUMAN) 50 MILLILITER(S): 12.5 INJECTION, SOLUTION INTRAVENOUS at 06:22

## 2025-05-16 RX ADMIN — Medication 15 MILLILITER(S): at 18:57

## 2025-05-16 RX ADMIN — NYSTATIN 1 APPLICATION(S): 100000 CREAM TOPICAL at 05:32

## 2025-05-16 RX ADMIN — Medication 100 MILLIEQUIVALENT(S): at 22:01

## 2025-05-16 RX ADMIN — HEPARIN SODIUM 5000 UNIT(S): 1000 INJECTION INTRAVENOUS; SUBCUTANEOUS at 22:01

## 2025-05-16 NOTE — DIETITIAN INITIAL EVALUATION ADULT - NSFNSGIIOFT_GEN_A_CORE
No BMs documented this admit; GI: WDL per flow sheet     05-15-25 @ 07:01  -  05-16-25 @ 07:00  --------------------------------------------------------  OUT:    Nasogastric/Oral tube (mL): 150 mL  Total OUT: 150 mL    Total NET: -150 mL

## 2025-05-16 NOTE — PROGRESS NOTE ADULT - ASSESSMENT
74F found to have perforated peptic ulcer now s/p ex lap, distal antrectomy, abthera placement requiring vasopressors.    NEUROLOGICAL:  #sedation  - precedex infusion, RASS -2 (open abdomen)  #Acute pain    -IV APAP prn    -fentanyl 12.5mcg prn moderate pain   - fentanyl 25mcg prn severe pain    RESPIRATORY:   #Mechanical ventilation, s/p previous tracheostomy (11/24)    - now with 8 portex cuffed trach (switched in OR on 5/14)    - Vent settings: 450/16/30/10    - continue duonebs   #Decreased small bibasilar opacities/atelectasis, right greater than left on CT  #Activity    -bedrest given open abdomen     CARDS:   #Nonsustained Vtach - self resolved   - cards consult: management of septic shock. Start short acting AC for afib. Start beta blockers once off pressors   #hypotension 2/2 sepsis   - currently on levophed gtt and vasopressin @0.03  - MAP >65  #hx afib  - holding home Xarelto 15 QD (s/p PCC in OR)  #hx HTN  - holding home losartan 100QD  #elevated troponin  - 143 > 84   - no longer trending  Imaging:   #TTE 11/24: EF 71%. Mild-mod AS. Trivial pericardial effusion.   - Echo 5/15: EF 30-35%, multiple left ventricular motion wall abnormalities. Possible Takotsubo vs ischemic. Global left ventricular systolic function.     GASTROINTESTINAL/NUTRITION:   #perforated prepyloric gastric ulcer s/p exploratory laparotomy antrectomy and D1 stapled off with temporary abdominal closure and abthera in place  - plan to RTOR for closure   - strict NPO/NGT to LCWS  #Diet, Strict NPO    - G tube to gravity     - aspiration precautions, HOB 30    - hx of GJ placement (11/24)   #GI Prophylaxis    - protonix 40bid IV  #Bowel regimen    -holding    -last bowel movement prior to admission    /RENAL:   #urine output in critically ill    -chronic indwelling ya  #Maintain euvolemia    - LR @ 110cc/hr  #metabolic acidosis    - off sodium bicarbonate infusion  #NOLA (baseline creatine 0.6)  - nephrology consulted > no RRT at this time  #hx congential solitary kidney     Labs:       HEME/ONC:   #DVT prophylaxis    -HSQ    -SCDs    Labs:   T&S Expires: 5/16    ID:  #perforated duodenal ulcer  - zosyn (renal dosing) (5/14 - )  - caspofungin per primary team (5/14 - )  - Nystatin powder for groin rash (5/15 - )    #MRSA pending   #COVID positive on admission    - remdesivir 200mg x1, 100mg x 2 day course  #Cultures    - 5/13: blood culture: NGTD    - 5/13: blood culture: NGTD    - 5/13: urine culture: E Coli  - ID following    ENDOCRINE:  #glycemic monitoring    -FSG q6 while NPO    -Glucose goal 140-180. If above 180, will start corrective insulin sliding scale  #hypercalcemia  - holding home cinacalcet 30mg bid while NPO    MSK:  #Activity - bedrest given open abdomen/abthera   #hx osteoporosis  - holding home Alendronate 70mg weekly on Thursdays while NPO    SKIN:  #DTI screening negative 5/15    - will continue to monitor daily skin changes      PALLIATIVE:  Currently full code, palliative consult       LINES/DRAINS:  PIV, Ya (chronic), GJ in place (chronic), 8 cuffed portex trach, R radial arterial line (5/14- ), LIJ CVC (5/14- )  ADVANCED DIRECTIVES:  Full Code    HCP/Emergency Contact- daughter Cecilia [phone]    INDICATION FOR SICU/SDU:  perforated peptic ulcer; mechanical ventilation/vasopressors     DISPO:  SICU. Case to be discussed with attending Dr. Pena 74F found to have perforated peptic ulcer now s/p ex lap, distal antrectomy, abthera placement requiring vasopressors.    NEUROLOGICAL:  #sedation  - precedex infusion, RASS -2 (open abdomen)  #Acute pain    -IV APAP prn    -fentanyl 12.5mcg prn moderate pain   - fentanyl 25mcg prn severe pain    RESPIRATORY:   #Mechanical ventilation, s/p previous tracheostomy (11/24)    - now with 8 portex cuffed trach (switched in OR on 5/14)    - Vent settings: 450/16/30/10    - continue duonebs   #Decreased small bibasilar opacities/atelectasis, right greater than left on CT  #Activity    -bedrest given open abdomen     CARDS:   #Nonsustained Vtach - self resolved   - cards consult: management of septic shock. Start short acting AC for afib. Start beta blockers once off pressors   #hypotension 2/2 sepsis   - currently on levophed gtt and vasopressin @0.03  - MAP >65  #hx afib  - holding home Xarelto 15 QD (s/p PCC in OR)  #hx HTN  - holding home losartan 100QD  #elevated troponin  - 143 > 84   - no longer trending  Imaging:   #TTE 11/24: EF 71%. Mild-mod AS. Trivial pericardial effusion.   - Echo 5/15: EF 30-35%, multiple left ventricular motion wall abnormalities. Possible Takotsubo vs ischemic. Global left ventricular systolic function.     GASTROINTESTINAL/NUTRITION:   #perforated prepyloric gastric ulcer s/p exploratory laparotomy antrectomy and D1 stapled off with temporary abdominal closure and abthera in place  - plan to RTOR for closure   - strict NPO/NGT to LCWS  #Diet, Strict NPO    - G tube to gravity     - aspiration precautions, HOB 30    - hx of GJ placement (11/24)   #GI Prophylaxis    - protonix 40bid IV  #Bowel regimen    -holding    -last bowel movement prior to admission    /RENAL:   #urine output in critically ill    -chronic indwelling ya  #Maintain euvolemia    - LR @ 110cc/hr  #metabolic acidosis    - off sodium bicarbonate infusion  #NOLA (baseline creatine 0.6)  - nephrology consulted > no RRT at this time  #hx congential solitary kidney   #Hypokalemia - repleted  #Hypophosphatemia - repleted     Labs:          BUN/Cr- 44/1.9  -->,  35/1.4  -->          [05-15 @ 18:04]Na  139 // K  3.7 // Mg  2.1 // Phos  2.8       HEME/ONC:   #DVT prophylaxis    -HSQ    -SCDs    Labs: Hb/Hct:  9.4/29.3  -->,  8.1/25.0  -->                      Plts:  407  -->,  297  -->                 PTT/INR:  29.2/1.31  --->       ID:  WBC- 22.35  --->>,  22.19  --->>,  16.67  --->>  Temp trend- 24hrs T(F): 97.9 (05-15 @ 20:00), Max: 99.1 (05-15 @ 04:00)  Current antibiotics-caspofungin IVPB 50 every 24 hours  #perforated duodenal ulcer  - zosyn (renal dosing) (5/14 - )  - caspofungin per primary team (5/14 - )  - Nystatin powder for groin rash (5/15 - )    #MRSA pending   #COVID positive on admission    - remdesivir 200mg x1, 100mg x 2 day course  #Cultures    - 5/13: blood culture: NGTD    - 5/13: blood culture: NGTD    - 5/13: urine culture: E Coli  - ID following    ENDOCRINE:  #glycemic monitoring    -FSG q6 while NPO    -Glucose goal 140-180. If above 180, will start corrective insulin sliding scale  #hypercalcemia  - holding home cinacalcet 30mg bid while NPO    MSK:  #Activity - bedrest given open abdomen/abthera   #hx osteoporosis  - holding home Alendronate 70mg weekly on Thursdays while NPO    SKIN:  #DTI screening negative 5/15    - will continue to monitor daily skin changes      PALLIATIVE:  Currently full code, palliative consult       LINES/DRAINS:  PIV, Ya (chronic), GJ in place (chronic), 8 cuffed portex trach, R radial arterial line (5/14- ), LIJ CVC (5/14- )  ADVANCED DIRECTIVES:  Full Code    HCP/Emergency Contact- pérez Brooks [phone]    INDICATION FOR SICU/SDU:  perforated peptic ulcer; mechanical ventilation/vasopressors     DISPO:  SICU. Case to be discussed with attending Dr. Pena 74F found to have perforated peptic ulcer now s/p ex lap, distal antrectomy, abthera placement requiring vasopressors.    NEUROLOGICAL:  #sedation  - precedex infusion, RASS -2 (open abdomen)  #Acute pain    -IV APAP prn    -fentanyl 12.5mcg prn moderate pain   - fentanyl 25mcg prn severe pain    RESPIRATORY:   #Mechanical ventilation, s/p previous tracheostomy ()    - now with 8 portex cuffed trach (switched in OR on )    - Vent settings: 450/16/30/10    - AM VB.36/37/36/21 lac 1.2    - continue duonebs   #Decreased small bibasilar opacities/atelectasis, right greater than left on CT  #Activity    -bedrest given open abdomen     CARDS:   #Nonsustained Vtach - self resolved   - cards consult: management of septic shock. Start short acting AC for afib. Start beta blockers once off pressors   #hypotension 2/2 sepsis   - currently on levophed gtt and vasopressin @0.03  - MAP >65  #hx afib  - holding home Xarelto 15 QD (s/p PCC in OR)  #hx HTN  - holding home losartan 100QD  #elevated troponin  - 143 > 84   - no longer trending  #TTE : EF 71%. Mild-mod AS. Trivial pericardial effusion.   - Echo 5/15: EF 30-35%, multiple left ventricular motion wall abnormalities. Possible Takotsubo vs ischemic. Global left ventricular systolic function.     GASTROINTESTINAL/NUTRITION:   #perforated prepyloric gastric ulcer s/p exploratory laparotomy antrectomy and D1 stapled off with temporary abdominal closure and abthera in place  - plan to RTOR for closure today   #Diet, Strict NPO    - G tube to gravity     - NGT to LCWS    - aspiration precautions, HOB 30    - hx of GJ placement ()   #GI Prophylaxis    - protonix 40mg bid IV  #Bowel regimen    -holding    -last bowel movement prior to admission    /RENAL:   #urine output in critically ill    -chronic indwelling ya  #Maintain euvolemia    - LR @ 110cc/hr  #metabolic acidosis    - off sodium bicarbonate infusion  #NOLA (baseline creatine 0.6)  - nephrology consulted > no RRT at this time  #oliguria  - received 25% albumin 50cc   #hx congential solitary kidney   #Hypokalemia - repleted  #Hypophosphatemia - repleted     Labs:          BUN/Cr- 44/1.9  -->,  35/1.4  -->          [05-15 @ 18:04]Na  139 // K  3.7 // Mg  2.1 // Phos  2.8       HEME/ONC:   #DVT prophylaxis    -HSQ    -SCDs    Labs: Hb/Hct:  9.4/29.3  -->,  8.1/25.0  -->                      Plts:  407  -->,  297  -->                 PTT/INR:  29.2/1.31  --->       ID:  WBC- 22.35  --->>,  22.19  --->>,  16.67  --->>  Temp trend- 24hrs T(F): 97.9 (05-15 @ 20:00), Max: 99.1 (05-15 @ 04:00)  Current antibiotics-caspofungin IVPB 50 every 24 hours  #perforated duodenal ulcer  - zosyn (renal dosing) ( - )  - caspofungin per primary team ( - )  - Nystatin powder for groin rash (5/15 - )    #MRSA pending   #COVID positive on admission    - remdesivir 200mg x1, 100mg x 2 day course  #Cultures    - : blood culture: NGTD    - : blood culture: NGTD    - : urine culture: E Coli  - ID following    ENDOCRINE:  #glycemic monitoring    -FSG q6 while NPO    -Glucose goal 140-180. If above 180, will start corrective insulin sliding scale  #hypercalcemia  - holding home cinacalcet 30mg bid while NPO    MSK:  #Activity - bedrest given open abdomen/abthera   #hx osteoporosis  - holding home Alendronate 70mg weekly on  while NPO    SKIN:  #DTI screening negative 5/15    - will continue to monitor daily skin changes      PALLIATIVE:  Currently full code, palliative consult       LINES/DRAINS:  Jennyfer OSPINA (chronic), GJ in place (chronic), 8 cuffed portex trach, R radial arterial line (- ), LIJ CVC (- )  ADVANCED DIRECTIVES:  Full Code    HCP/Emergency Contact- daughter Cecilia [phone]    INDICATION FOR SICU/SDU:  perforated peptic ulcer; mechanical ventilation/vasopressors     DISPO:  SICU. Case to be discussed with attending Dr. Pena

## 2025-05-16 NOTE — CHART NOTE - NSCHARTNOTEFT_GEN_A_CORE
PACU ANESTHESIA ADMISSION NOTE      Procedure: Partial antrectomy    US Doppler guided insertion of central venous catheter      Post op diagnosis:  Perforated peptic ulcer    Intubated and sedated        Vitals:   T:   37        R:   CV               BP:   140/78               Sat:   100                P: 80      Mental Status:     ___x__ Sedated    Nausea/Vomiting:  _x___ NO  ______Yes,   See Post - Op Orders          Pain Scale (0-10):  _____    Treatment: __x__ None    ____ See Post - Op/PCA Orders    Post - Operative Fluids:   ____ Oral   ___x_ See Post - Op Orders    Plan: Discharge:     __x___Critical Care    _____  Other:_________________    Comments:    Patient transported to SICU on all monitors, ventilated with Ambu bag 100%FiO2. Signed out to the bedside nursing staff and PA.

## 2025-05-16 NOTE — PROGRESS NOTE ADULT - SUBJECTIVE AND OBJECTIVE BOX
PATRICIA SPRAGUE   [id number]   [birth year]    74yF  ============================================================   DATE OF INITIAL SICU/SDU CONSULT: 05-13-25    INDICATION FOR SICU CONSULT:  perforated duodenal ulcer      SICU COURSE EVENTS :  05-13 - admitted to SICU service  05-14 - s/p OR for exploratory laparotomy, started remdesivir for covid +, echo ordered  5/15 - Echo showing EF 30-35%. Pending RTOR. Remains on vasopressors, weaning. Pending RTOR.     24HOUR EVENTS  5/15  NIGHT   -PM rounds: GCS 11T, precedex @ 0.3, MAP 65, levophed @ 0.16, vaso @ 0.03, SVV 12, NSR HR 60s, saturating 100% on AC//16/30/10, open abdomen w/ abthera, tender to touch, G tube to gravity, J clamped, UOP 65cc/hr  -15 Kphos   -AM CXR/ABG  -BCx prelim negative at 48hrs  -Pending RTOR    Day  - decr RR 16  - cards c/s- management of septic shock. Start short acting AC for afib. Start beta blockers once off pressors   - pending OR today  - Echo - EF 30-35%, multiple left ventricular motion wall abnormalities. Possible Takotsubo vs ischemic. Global left ventricular systolic function.   - Nystatin powder added for groin rash      [X] A ten-point review of systems was negative except as expressed in note.  [X] History was obtained from patient. If unable to participate in their care, history obtained from review of the chart and collateral sources of information.  ============================================================    PATRICIA SPRAGUE   [id number]   [birth year]    74yF  ============================================================   DATE OF INITIAL SICU/SDU CONSULT: 05-13-25    INDICATION FOR SICU CONSULT:  perforated duodenal ulcer      SICU COURSE EVENTS :  05-13 - admitted to SICU service  05-14 - s/p OR for exploratory laparotomy, started remdesivir for covid +, echo ordered  5/15 - Echo showing EF 30-35%. Pending RTOR. Remains on vasopressors, weaning. Pending RTOR.     24HOUR EVENTS  5/15  NIGHT   -PM rounds: GCS 11T, precedex @ 0.3, MAP 65, levophed @ 0.16, vaso @ 0.03, SVV 12, NSR HR 60s, saturating 100% on AC//16/30/10, open abdomen w/ abthera, tender to touch, G tube to gravity, J clamped, UOP 65cc/hr  -15 Kphos   -AM CXR/ABG  -BCx prelim negative at 48hrs  -Pending RTOR    Day  - decr RR 16  - cards c/s- management of septic shock. Start short acting AC for afib. Start beta blockers once off pressors   - pending OR today  - Echo - EF 30-35%, multiple left ventricular motion wall abnormalities. Possible Takotsubo vs ischemic. Global left ventricular systolic function.   - Nystatin powder added for groin rash      [X] A ten-point review of systems was negative except as expressed in note.  [X] History was obtained from patient. If unable to participate in their care, history obtained from review of the chart and collateral sources of information.  ============================================================   Daily Height in cm: 167.6 (16 May 2025 08:01)    Daily     Diet, NPO (05-13-25 @ 22:39)      CURRENT MEDS:  Neurologic Medications  acetaminophen   IVPB .. 1000 milliGRAM(s) IV Intermittent once  dexMEDEtomidine Infusion 0.2 MICROgram(s)/kG/Hr IV Continuous <Continuous>  fentaNYL    Injectable 12.5 MICROGram(s) IV Push every 4 hours PRN Moderate Pain (4 - 6)  fentaNYL    Injectable 25 MICROGram(s) IV Push every 4 hours PRN Severe Pain (7 - 10)    Respiratory Medications    Cardiovascular Medications  norepinephrine Infusion 0.05 MICROgram(s)/kG/Min IV Continuous <Continuous>    Gastrointestinal Medications  lactated ringers. 1000 milliLiter(s) IV Continuous <Continuous>  pantoprazole  Injectable 40 milliGRAM(s) IV Push every 12 hours  sodium chloride 0.9% lock flush 10 milliLiter(s) IV Push every 1 hour PRN Pre/post blood products, medications, blood draw, and to maintain line patency    Genitourinary Medications    Hematologic/Oncologic Medications  heparin   Injectable 5000 Unit(s) SubCutaneous every 8 hours    Antimicrobial/Immunologic Medications  caspofungin IVPB 50 milliGRAM(s) IV Intermittent every 24 hours  caspofungin IVPB      piperacillin/tazobactam IVPB.. 3.375 Gram(s) IV Intermittent every 8 hours  remdesivir  IVPB 100 milliGRAM(s) IV Intermittent every 24 hours    Endocrine/Metabolic Medications  vasopressin Infusion. 0.03 Unit(s)/Min IV Continuous <Continuous>    Topical/Other Medications  chlorhexidine 0.12% Liquid 15 milliLiter(s) Oral Mucosa every 12 hours  chlorhexidine 2% Cloths 1 Application(s) Topical <User Schedule>  nystatin Powder 1 Application(s) Topical two times a day      ICU Vital Signs Last 24 Hrs  T(C): 36 (16 May 2025 04:00), Max: 37.2 (15 May 2025 12:00)  T(F): 96.8 (16 May 2025 04:00), Max: 99 (15 May 2025 12:00)  HR: 54 (16 May 2025 07:00) (54 - 80)  BP: 105/59 (16 May 2025 07:00) (97/52 - 127/57)  BP(mean): 75 (16 May 2025 07:00) (66 - 91)  ABP: 122/49 (16 May 2025 07:00) (80/68 - 139/57)  ABP(mean): 69 (16 May 2025 07:00) (56 - 78)  RR: --  SpO2: 99% (16 May 2025 07:00) (97% - 100%)      Mode: AC/ CMV (Assist Control/ Continuous Mandatory Ventilation)  RR (machine): 16  TV (machine): 450  FiO2: 30  PEEP: 10  ITime: 0.8  MAP: 12  PIP: 22    ABG - ( 15 May 2025 05:05 )  pH, Arterial: 7.42  pH, Blood: x     /  pCO2: 29    /  pO2: 90    / HCO3: 19    / Base Excess: -4.9  /  SaO2: 98.4        I&O's Summary    15 May 2025 07:01  -  16 May 2025 07:00  --------------------------------------------------------  IN: 3921.2 mL / OUT: 2780 mL / NET: 1141.2 mL      I&O's Detail    15 May 2025 07:01  -  16 May 2025 07:00  --------------------------------------------------------  IN:    Dexmedetomidine: 158.2 mL    IV PiggyBack: 250 mL    IV PiggyBack: 250 mL    IV PiggyBack: 200 mL    IV PiggyBack: 50 mL    Lactated Ringers: 2640 mL    Norepinephrine: 265 mL    Vasopressin (Organ Donation): 108 mL  Total IN: 3921.2 mL    OUT:    Drain (mL): 0 mL    Nasogastric/Oral tube (mL): 150 mL    Ureteral Catheter (mL): 2480 mL    VAC (Vacuum Assisted Closure) System (mL): 150 mL  Total OUT: 2780 mL    Total NET: 1141.2 mL    PHYSICAL EXAM:    General/Neuro  RASS:             GCS:  11T   = E   / V   / M      Deficits: awake and alert, no focal deficits    Lungs:  Normal expansion/effort.     Cardiovascular : S1, S2.  Irregular rate and rhythm.  Peripheral edema in B/L UE   Cardiac Rhythm: Afib with PVCs    GI: Abdomen soft, +tender near G tube, Non-distended.    Abthera vac in place     Extremities: Extremities warm, pink, well-perfused.     Derm: Good skin turgor, no skin breakdown.      :  Burger catheter in place.      LABS:  POCT Blood Glucose.: 126 mg/dL (16 May 2025 05:08)  POCT Blood Glucose.: 151 mg/dL (15 May 2025 23:01)  POCT Blood Glucose.: 133 mg/dL (15 May 2025 15:04)                          8.1    16.67 )-----------( 297      ( 15 May 2025 18:04 )             25.0       05-15    139  |  105  |  35[H]  ----------------------------<  147[H]  3.7   |  22  |  1.4    Ca    9.0      15 May 2025 18:04  Phos  2.8     05-15  Mg     2.1     05-15    TPro  4.9[L]  /  Alb  2.7[L]  /  TBili  0.5  /  DBili  0.2  /  AST  20  /  ALT  16  /  AlkPhos  100  05-14      PT/INR - ( 15 May 2025 21:09 )   PT: 15.50 sec;   INR: 1.31 ratio         PTT - ( 15 May 2025 21:09 )  PTT:29.2 sec      Urinalysis Basic - ( 15 May 2025 18:04 )    Color: x / Appearance: x / SG: x / pH: x  Gluc: 147 mg/dL / Ketone: x  / Bili: x / Urobili: x   Blood: x / Protein: x / Nitrite: x   Leuk Esterase: x / RBC: x / WBC x   Sq Epi: x / Non Sq Epi: x / Bacteria: x    Urinalysis with Rflx Culture (collected 13 May 2025 17:22)    Culture - Urine (collected 13 May 2025 17:22)  Source: Catheterized None  Preliminary Report (15 May 2025 00:33):    >100,000 CFU/ml Escherichia coli    Culture - Blood (collected 13 May 2025 15:45)  Source: Blood Blood-Peripheral  Preliminary Report (15 May 2025 23:01):    No growth at 48 Hours    Culture - Blood (collected 13 May 2025 15:35)  Source: Blood Blood-Peripheral  Preliminary Report (15 May 2025 23:01):    No growth at 48 Hours

## 2025-05-16 NOTE — PRE-ANESTHESIA EVALUATION ADULT - NSRADCARDRESULTSFT_GEN_ALL_CORE
Ventricular Rate 83 BPM    QRS Duration 104 ms    Q-T Interval 380 ms    QTC Calculation(Bazett) 446 ms    R Axis -22 degrees    T Axis 28 degrees    Diagnosis Line Atrial fibrillation  Low voltage QRS  Septal infarct , age undetermined  Abnormal ECG    5/15/2025 TTE  Summary:   1. Left ventricular ejection fraction, by visual estimation, is 30 to   35%.   2. Moderately to severely decreased global left ventricular systolic   function.   3. Multiple left ventricular regional wall motion abnormalities exist.   See wall motion findings. Possible Takotsubo CM vs. ischemic CM.     4. Mildly increased LV wall thickness.   5. The mitral in-flow pattern reveals no discernable A-wave, therefore   no comment on diastolic function can be made.   6. No evidence of LV thrombus.   7. Normal left atrial size.   8. Normal right atrial size.   9. Mild tricuspid regurgitation.  10. Aortic valve thickening with decreased leaflet opening.  11. Endocardial visualization was enhanced with intravenous echo contrast.

## 2025-05-16 NOTE — PROGRESS NOTE ADULT - SUBJECTIVE AND OBJECTIVE BOX
seen and examined  24 h events noted         PAST HISTORY  --------------------------------------------------------------------------------  No significant changes to PMH, PSH, FHx, SHx, unless otherwise noted    ALLERGIES & MEDICATIONS  --------------------------------------------------------------------------------  Allergies    No Known Allergies    Intolerances      Standing Inpatient Medications  acetaminophen   IVPB .. 1000 milliGRAM(s) IV Intermittent once  caspofungin IVPB 50 milliGRAM(s) IV Intermittent every 24 hours  caspofungin IVPB      chlorhexidine 0.12% Liquid 15 milliLiter(s) Oral Mucosa every 12 hours  chlorhexidine 2% Cloths 1 Application(s) Topical <User Schedule>  dexMEDEtomidine Infusion 0.2 MICROgram(s)/kG/Hr IV Continuous <Continuous>  heparin   Injectable 5000 Unit(s) SubCutaneous every 8 hours  lactated ringers. 1000 milliLiter(s) IV Continuous <Continuous>  norepinephrine Infusion 0.05 MICROgram(s)/kG/Min IV Continuous <Continuous>  nystatin Powder 1 Application(s) Topical two times a day  pantoprazole  Injectable 40 milliGRAM(s) IV Push every 12 hours  piperacillin/tazobactam IVPB.. 3.375 Gram(s) IV Intermittent every 8 hours  remdesivir  IVPB 100 milliGRAM(s) IV Intermittent every 24 hours  vasopressin Infusion. 0.03 Unit(s)/Min IV Continuous <Continuous>    PRN Inpatient Medications  fentaNYL    Injectable 12.5 MICROGram(s) IV Push every 4 hours PRN  fentaNYL    Injectable 25 MICROGram(s) IV Push every 4 hours PRN  sodium chloride 0.9% lock flush 10 milliLiter(s) IV Push every 1 hour PRN      VITALS/PHYSICAL EXAM  --------------------------------------------------------------------------------  T(C): 36.2 (05-16-25 @ 08:00), Max: 37.2 (05-15-25 @ 12:00)  HR: 55 (05-16-25 @ 09:00) (54 - 78)  BP: 121/60 (05-16-25 @ 09:00) (99/63 - 127/57)  RR: --  SpO2: 97% (05-16-25 @ 09:00) (94% - 100%)  Wt(kg): --  Height (cm): 167.6 (05-16-25 @ 08:01)  Weight (kg): 87 (05-16-25 @ 08:01)  BMI (kg/m2): 31 (05-16-25 @ 08:01)  BSA (m2): 1.96 (05-16-25 @ 08:01)      05-15-25 @ 07:01  -  05-16-25 @ 07:00  --------------------------------------------------------  IN: 3921.2 mL / OUT: 2780 mL / NET: 1141.2 mL    05-16-25 @ 07:01  -  05-16-25 @ 10:08  --------------------------------------------------------  IN: 259.8 mL / OUT: 50 mL / NET: 209.8 mL      Physical Exam:  	Gen:vent  	Pulm: B/L osman   	CV: S1S2; no rub  	Abd: +distended        LABS/STUDIES  --------------------------------------------------------------------------------              8.1    16.67 >-----------<  297      [05-15-25 @ 18:04]              25.0     139  |  105  |  35  ----------------------------<  147      [05-15-25 @ 18:04]  3.7   |  22  |  1.4        Ca     9.0     [05-15-25 @ 18:04]      Mg     2.1     [05-15-25 @ 18:04]      Phos  2.8     [05-15-25 @ 18:04]    TPro  4.9  /  Alb  2.7  /  TBili  0.5  /  DBili  0.2  /  AST  20  /  ALT  16  /  AlkPhos  100  [05-14-25 @ 22:15]    PT/INR: PT 15.50, INR 1.31       [05-15-25 @ 21:09]  PTT: 29.2       [05-15-25 @ 21:09]      Creatinine Trend:  SCr 1.4 [05-15 @ 18:04]  SCr 1.9 [05-14 @ 22:15]  SCr 2.4 [05-14 @ 04:10]  SCr 3.9 [05-13 @ 15:45]  SCr 0.6 [04-18 @ 06:02]    Urinalysis - [05-15-25 @ 18:04]      Color  / Appearance  / SG  / pH       Gluc 147 / Ketone   / Bili  / Urobili        Blood  / Protein  / Leuk Est  / Nitrite       RBC  / WBC  / Hyaline  / Gran  / Sq Epi  / Non Sq Epi  / Bacteria       PTH -- (Ca --)      [04-11-25 @ 10:15]   195  PTH -- (Ca 12.6)      [04-10-25 @ 10:32]   132  Vitamin D (25OH) 30      [04-10-25 @ 10:32]  TSH 0.63      [04-16-25 @ 09:40]  Lipid: chol 133, , HDL 34, LDL --      [11-20-24 @ 04:32]

## 2025-05-16 NOTE — PRE-ANESTHESIA EVALUATION ADULT - NSANTHRISKNONERD_GEN_ALL_CORE
SW/ELENA Infant Initial Plan of Care Note     Baseline Assessment  Zora Andrade LCSW (ph [phone]; pager [phone])    SW consult for mom who wants to purchase a car seat.  SW collected money, and submitted to lockbox. Place receipt in chart slot for RN to give at discharge.    There are no other needs identified by the mother.  
No risk alerts present
Risk Alerts:

## 2025-05-16 NOTE — DIETITIAN INITIAL EVALUATION ADULT - OTHER INFO
Pertinent Medical Information: Per H&P, pt is a 80 y/o female w/ PMHx HTN, a-fib (on xarelto), solitary kidney, DM, GERD, prior open cholecystectomy c/b suture granulomas s/p 2019 abdominal wall debridements by Dr. Banda, respiratory distress with prolonged ventilation during 11/2024 admission, s/p 11/19 tracheostomy and gastrostomy and feeding jejunostomy tube by Dr. Champion, ya catheter dependence who presented to the ED from NH on 5/13 with 4 days progressively worsening abdominal pain. 05-14 - s/p OR for exploratory laparotomy.    Per MD note on 5/15:  #perforated prepyloric gastric ulcer s/p exploratory laparotomy antrectomy and D1 stapled off with temporary abdominal closure and abthera in place  - plan to RTOR later today for closure   - strict NPO/NGT to LCWS    Per Nephrology note on 5/16:   # [facility] most likely ATN sp perforated duodenal ulcer sp OR   # sepsis   - cr trending down   - ph at goal   - no need for RRT yet     Pertinent Subjective Information: NPO at this time. NGT in place for gastric decompression.    Weight hx: UBW unknown. Current dosing weight is 87 KG. Previous admission weight was 82.1 KG on 4/14/25 per HIE. 5.6% unintentional weight gain in 1 month compared to current dosing weight. Pt does not meet weight criteria for malnutrition at this time.

## 2025-05-16 NOTE — CHART NOTE - NSCHARTNOTEFT_GEN_A_CORE
Post Operative Check    Patient is post op from a Partial antrectomy [24739]    US Doppler guided insertion of central venous catheter [95668]    Laparotomy, second look, with packing removal [[id number]]    Billroth II operation [22506]    Replacement of gastrojejunostomy tube [[id number]]    Postoperative, the patient is trached with vent settings 60/10, on levo 0.08 and vaso 0.03. BULMARO with serosanguinous output and GJ clean, dry and intact.     Vitals    T(C): 36.6 (05-16-25 @ 20:00), Max: 36.6 (05-16-25 @ 00:00)  HR: 54 (05-16-25 @ 22:00) (50 - 73)  BP: 101/57 (05-16-25 @ 15:13) (99/55 - 121/60)  RR: 16 (05-16-25 @ 22:00) (15 - 18)  SpO2: 100% (05-16-25 @ 22:00) (89% - 100%)  Wt(kg): --      05-15 @ 07:01  -  05-16 @ 07:00  --------------------------------------------------------  IN:    Dexmedetomidine: 158.2 mL    IV PiggyBack: 250 mL    IV PiggyBack: 250 mL    IV PiggyBack: 200 mL    IV PiggyBack: 50 mL    Lactated Ringers: 2640 mL    Norepinephrine: 265 mL    Vasopressin (Organ Donation): 108 mL  Total IN: 3921.2 mL    OUT:    Drain (mL): 0 mL    Nasogastric/Oral tube (mL): 150 mL    Ureteral Catheter (mL): 2480 mL    VAC (Vacuum Assisted Closure) System (mL): 150 mL  Total OUT: 2780 mL    Total NET: 1141.2 mL      05-16 @ 07:01  -  05-16 @ 23:41  --------------------------------------------------------  IN:    Dexmedetomidine: 91 mL    IV PiggyBack: 250 mL    Lactated Ringers: 1290 mL    Norepinephrine: 106.1 mL    Vasopressin (Organ Donation): 63 mL  Total IN: 1800.1 mL    OUT:    Bulb (mL): 25 mL    Nasogastric/Oral tube (mL): 50 mL    Ureteral Catheter (mL): 315 mL  Total OUT: 390 mL    Total NET: 1410.1 mL          Labs                        7.7    13.13 )-----------( 240      ( 16 May 2025 19:10 )             23.9       CBC Full  -  ( 16 May 2025 19:10 )  WBC Count : 13.13 K/uL  Hemoglobin : 7.7 g/dL  Hematocrit : 23.9 %  Platelet Count - Automated : 240 K/uL  Mean Cell Volume : 87.2 fL  Mean Cell Hemoglobin : 28.1 pg  Mean Cell Hemoglobin Concentration : 32.2 g/dL  Auto Neutrophil # : x  Auto Lymphocyte # : x  Auto Monocyte # : x  Auto Eosinophil # : x  Auto Basophil # : x  Auto Neutrophil % : x  Auto Lymphocyte % : x  Auto Monocyte % : x  Auto Eosinophil % : x  Auto Basophil % : x      Physical Exam  General: NAD AAOx3   Resp: symmetric and equal chest rise and fall on trach   Abdomen: soft, nondistended, GJ in place c/d/i      Patient is a 74y old Female s/p take back and second look laparotomy with billroth 2 and replacement of GJ    Plan:  - Monitor BULMARO drain   - wean off pressors as tolerated  - Monitor post op labs  - Monitor GJ site for any bleeding

## 2025-05-16 NOTE — DIETITIAN INITIAL EVALUATION ADULT - OTHER CALCULATIONS
Using ABW: ENERGY: 7448-6704 kcal/day (20-25 kcal/kg); PROTEIN:  g/day (1.5-2 g/kg IBW 59 KG); FLUID: 5232-9981 mL/day (25-30 mL/kg) -- with consideration for age, BMI, critical illness setting, trached/PEG-J

## 2025-05-16 NOTE — DIETITIAN INITIAL EVALUATION ADULT - NAME AND PHONE
Jennifer [phone] or TEAMS    Nutrition Monitoring: Diet order, weights, labs, NFPF, body composition, BM, GOC, SLP recs, GOC

## 2025-05-16 NOTE — PROGRESS NOTE ADULT - SUBJECTIVE AND OBJECTIVE BOX
DARCIEPATRICIA  74y, Female  Allergy: No Known Allergies      LOS  3d    CHIEF COMPLAINT: perforated prepyloric ulcer (15 May 2025 14:42)      INTERVAL EVENTS/HPI  - T(F): , Max: 99.1 (05-15-25 @ 08:00) on pressors   - WBC Count: 16.67 (05-15-25 @ 18:04) downtrending   WBC Count: 22.19 (05-14-25 @ 22:15)     - Creatinine: 1.4 (05-15-25 @ 18:04)  Creatinine: 1.9 (05-14-25 @ 22:15)     -   -   -     ROS  cannot obtain secondary to patient's sedation and/or mental status    VITALS:  T(F): 96.8, Max: 99.1 (05-15-25 @ 08:00)  HR: 64  BP: 108/56  RR: --Vital Signs Last 24 Hrs  T(C): 36 (16 May 2025 04:00), Max: 37.3 (15 May 2025 08:00)  T(F): 96.8 (16 May 2025 04:00), Max: 99.1 (15 May 2025 08:00)  HR: 64 (16 May 2025 06:30) (55 - 80)  BP: 108/56 (16 May 2025 06:00) (95/63 - 127/57)  BP(mean): 77 (16 May 2025 06:00) (66 - 91)  RR: --  SpO2: 97% (16 May 2025 06:30) (97% - 100%)        PHYSICAL EXAM:  ***    FH: Non-contributory  Social Hx: Non-contributory    TESTS & MEASUREMENTS:                        8.1    16.67 )-----------( 297      ( 15 May 2025 18:04 )             25.0     05-15    139  |  105  |  35[H]  ----------------------------<  147[H]  3.7   |  22  |  1.4    Ca    9.0      15 May 2025 18:04  Phos  2.8     05-15  Mg     2.1     05-15    TPro  4.9[L]  /  Alb  2.7[L]  /  TBili  0.5  /  DBili  0.2  /  AST  20  /  ALT  16  /  AlkPhos  100  05-14      LIVER FUNCTIONS - ( 14 May 2025 22:15 )  Alb: 2.7 g/dL / Pro: 4.9 g/dL / ALK PHOS: 100 U/L / ALT: 16 U/L / AST: 20 U/L / GGT: x           Urinalysis Basic - ( 15 May 2025 18:04 )    Color: x / Appearance: x / SG: x / pH: x  Gluc: 147 mg/dL / Ketone: x  / Bili: x / Urobili: x   Blood: x / Protein: x / Nitrite: x   Leuk Esterase: x / RBC: x / WBC x   Sq Epi: x / Non Sq Epi: x / Bacteria: x        Urinalysis with Rflx Culture (collected 05-13-25 @ 17:22)    Culture - Urine (collected 05-13-25 @ 17:22)  Source: Catheterized None  Preliminary Report (05-15-25 @ 00:33):    >100,000 CFU/ml Escherichia coli    Culture - Blood (collected 05-13-25 @ 15:45)  Source: Blood Blood-Peripheral  Preliminary Report (05-15-25 @ 23:01):    No growth at 48 Hours    Culture - Blood (collected 05-13-25 @ 15:35)  Source: Blood Blood-Peripheral  Preliminary Report (05-15-25 @ 23:01):    No growth at 48 Hours        Blood Gas Venous - Lactate: 1.2 mmol/L (05-16-25 @ 06:00)  Blood Gas Venous - Lactate: 1.2 mmol/L (05-15-25 @ 18:02)  Blood Gas Venous - Lactate: 1.5 mmol/L (05-14-25 @ 09:24)  Lactate, Blood: 1.1 mmol/L (05-13-25 @ 19:55)  Blood Gas Venous - Lactate: 1.3 mmol/L (05-13-25 @ 15:59)  Lactate, Blood: 1.2 mmol/L (05-13-25 @ 15:45)      INFECTIOUS DISEASES TESTING  MRSA PCR Result.: Negative (05-14-25 @ 07:20)  MRSA PCR Result.: Negative (04-14-25 @ 17:16)  Procalcitonin: 5.22 (11-05-24 @ 19:32)  MRSA PCR Result.: Negative (11-04-24 @ 13:15)      INFLAMMATORY MARKERS  Sedimentation Rate, Erythrocyte: 45 mm/hr (05-14-25 @ 22:15)  C-Reactive Protein: 154.6 mg/L (05-14-25 @ 22:15)      RADIOLOGY & ADDITIONAL TESTS:  I have personally reviewed the last available Chest xray  CXR  Xray Chest 1 View- PORTABLE-Urgent:   ACC: [id number] EXAM:  XR CHEST PORTABLE URGENT 1V   ORDERED BY: SENIA CONDON     PROCEDURE DATE:  05/14/2025          INTERPRETATION:  CLINICAL HISTORY: Support devices positioning, status   post ex lap.    COMPARISON: Chest radiograph dated5/13/2025.    TECHNIQUE: Portable frontal chest radiograph.    FINDINGS:    Support devices: Overlying telemetry leads. Enteric tube courses below   the diaphragm with tip outside the field of view. Partially visualized   tracheostomy catheter terminates 6.7 cm above the tor. Left internal   jugular central venous catheter terminating very proximally, possibly in   the left IJ.    Cardiac/mediastinum/hilum: Unchanged.    Lung parenchyma/Pleura: Bibasilar opacities. No pneumothorax.    Skeleton/soft tissues: Unchanged.      IMPRESSION:    Left internal jugular central venous catheter terminates very proximally,   possibly in the left IJ.   Enteric tube extends below diaphragm. Post tracheostomy.    Communication: The summary of above findings were discussed with readback   confirmation with Dr. Maxime Giles by resident Jimy Teran MD on   5/14/2025 at 8:59 AM.    --- End of Report ---          JIMY TERAN MD; Resident Radiologist  This document has been electronically signed.  CHRISSY PRITCHARD; Attending Radiologist  This document has been electronically signed. May 14 2025 12:05PM (05-14-25 @ 06:24)      CT  CT Abdomen and Pelvis w/ IV Cont:   ACC: [id number] EXAM:  CT ABDOMEN AND PELVIS IC   ORDERED BY: ROSEANN MCNEIL     PROCEDURE DATE:  05/13/2025          INTERPRETATION:  CLINICAL INFORMATION: Abdominal pain.    COMPARISON: November 8, 2024.    CONTRAST/COMPLICATIONS:  IV Contrast:Omnipaque 350  100 cc administered   0 cc discarded  Oral Contrast: NONE    PROCEDURE:  CT of the Abdomen and Pelvis was performed.  Sagittal and coronal reformats were performed.    FINDINGS:  LOWER CHEST: Decreased small bibasilar opacities/atelectasis, right   greater than left.    LIVER: Within normal limits.  BILE DUCTS: Normal caliber.  GALLBLADDER: Status post cholecystectomy.  SPLEEN: Within normal limits.  PANCREAS: Within normal limits.  ADRENALS: Unchanged bilateral adrenal gland nodules, left greater than   right.  KIDNEYS/URETERS: Atrophic right kidney. No hydronephrosis.    BLADDER: Urinary bladder is decompressed with Burger catheter balloon in   place.  REPRODUCTIVE ORGANS: Calcified uterine fibroid.    BOWEL: New moderate mural thickening and inflammation involving the   proximal duodenum, with focal defect/outpouching along lateral duodenal   wall, compatible with perforated duodenal ulcer (4/131.) Distal aspect of   a gastrojejunostomy tube is coiled within the stomach. No evidence of   bowel obstruction.  PERITONEUM/RETROPERITONEUM: No focal drainable fluid collection.  VESSELS: Scattered atherosclerotic vascular calcification.  LYMPH NODES: No enlarged abdominal or pelvic lymph nodes.  ABDOMINAL WALL: Within normallimits.  BONES: Degenerative changes of the spine. No acute osseous abnormality.    IMPRESSION:    *1. Since November 8, 2024, new moderate mural thickening and   inflammation involving the proximal proximal duodenal wall, with focal   defect/outpouching along lateral duodenum, compatible with perforated   duodenal ulcer; no focal drainable fluid collection.  2. Additional chronic/incidental findings, as detailed above.      *Spoke with DR. BURNS of the ED on 5/13/2025 7:25 PM with readback.    --- End of Report ---            JET WALDEN MD; Attending Radiologist  This document has been electronically signed. May 13 2025  7:43PM (05-13-25 @ 18:16)      CARDIOLOGY TESTING  12 Lead ECG:   Ventricular Rate 83 BPM    QRS Duration 104 ms    Q-T Interval 380 ms    QTC Calculation(Bazett) 446 ms    R Axis -22 degrees    T Axis 28 degrees    Diagnosis Line Atrial fibrillation  Low voltage QRS  Septal infarct , age undetermined  Abnormal ECG    Confirmed by Sanjeev Pierce ([id number]) on 5/14/2025 6:59:31 PM (05-13-25 @ 23:32)  12 Lead ECG:   Ventricular Rate 82 BPM    QRS Duration 104 ms    Q-T Interval 396 ms    QTC Calculation(Bazett) 462 ms    R Axis 144 degrees    T Axis 33 degrees    Diagnosis Line Atrial fibrillation  Right axis deviation  Low voltage QRS  Abnormal ECG    Confirmed by Sanjeev Pierce ([id number]) on 5/14/2025 6:56:22 PM (05-13-25 @ 17:11)      MEDICATIONS  acetaminophen   IVPB .. 1000  caspofungin IVPB 50  caspofungin IVPB   chlorhexidine 0.12% Liquid 15  chlorhexidine 2% Cloths 1  dexMEDEtomidine Infusion 0.2  heparin   Injectable 5000  lactated ringers. 1000  norepinephrine Infusion 0.05  nystatin Powder 1  pantoprazole  Injectable 40  piperacillin/tazobactam IVPB.. 3.375  remdesivir  IVPB 100  vasopressin Infusion. 0.03      WEIGHT  Weight (kg): 87 (05-14-25 @ 00:10)  Creatinine: 1.4 mg/dL (05-15-25 @ 18:04)      ANTIBIOTICS:  caspofungin IVPB 50 milliGRAM(s) IV Intermittent every 24 hours  caspofungin IVPB      piperacillin/tazobactam IVPB.. 3.375 Gram(s) IV Intermittent every 8 hours  remdesivir  IVPB 100 milliGRAM(s) IV Intermittent every 24 hours      All available historical records have been reviewed   DARCIEPATRICIA  74y, Female  Allergy: No Known Allergies      LOS  3d    CHIEF COMPLAINT: perforated prepyloric ulcer (15 May 2025 14:42)      INTERVAL EVENTS/HPI  - T(F): , Max: 99.1 (05-15-25 @ 08:00) on pressors   - WBC Count: 16.67 (05-15-25 @ 18:04) downtrending   WBC Count: 22.19 (05-14-25 @ 22:15)     - Creatinine: 1.4 (05-15-25 @ 18:04)  Creatinine: 1.9 (05-14-25 @ 22:15)     -   -   -     ROS  cannot obtain secondary to patient's sedation and/or mental status    VITALS:  T(F): 96.8, Max: 99.1 (05-15-25 @ 08:00)  HR: 64  BP: 108/56  RR: --Vital Signs Last 24 Hrs  T(C): 36 (16 May 2025 04:00), Max: 37.3 (15 May 2025 08:00)  T(F): 96.8 (16 May 2025 04:00), Max: 99.1 (15 May 2025 08:00)  HR: 64 (16 May 2025 06:30) (55 - 80)  BP: 108/56 (16 May 2025 06:00) (95/63 - 127/57)  BP(mean): 77 (16 May 2025 06:00) (66 - 91)  RR: --  SpO2: 97% (16 May 2025 06:30) (97% - 100%)        PHYSICAL EXAM:  Gen: trach/ vent, chronically ill appearing  HEENT: NCAT  CV: RRR  Lungs: Decreased BS at bases  Abd: Soft  Neuro: does not follow commands  Skin: no rash   Extremities: warm  Lines: clean, no phlebitis     FH: Non-contributory  Social Hx: Non-contributory    TESTS & MEASUREMENTS:                        8.1    16.67 )-----------( 297      ( 15 May 2025 18:04 )             25.0     05-15    139  |  105  |  35[H]  ----------------------------<  147[H]  3.7   |  22  |  1.4    Ca    9.0      15 May 2025 18:04  Phos  2.8     05-15  Mg     2.1     05-15    TPro  4.9[L]  /  Alb  2.7[L]  /  TBili  0.5  /  DBili  0.2  /  AST  20  /  ALT  16  /  AlkPhos  100  05-14      LIVER FUNCTIONS - ( 14 May 2025 22:15 )  Alb: 2.7 g/dL / Pro: 4.9 g/dL / ALK PHOS: 100 U/L / ALT: 16 U/L / AST: 20 U/L / GGT: x           Urinalysis Basic - ( 15 May 2025 18:04 )    Color: x / Appearance: x / SG: x / pH: x  Gluc: 147 mg/dL / Ketone: x  / Bili: x / Urobili: x   Blood: x / Protein: x / Nitrite: x   Leuk Esterase: x / RBC: x / WBC x   Sq Epi: x / Non Sq Epi: x / Bacteria: x        Urinalysis with Rflx Culture (collected 05-13-25 @ 17:22)    Culture - Urine (collected 05-13-25 @ 17:22)  Source: Catheterized None  Preliminary Report (05-15-25 @ 00:33):    >100,000 CFU/ml Escherichia coli    Culture - Blood (collected 05-13-25 @ 15:45)  Source: Blood Blood-Peripheral  Preliminary Report (05-15-25 @ 23:01):    No growth at 48 Hours    Culture - Blood (collected 05-13-25 @ 15:35)  Source: Blood Blood-Peripheral  Preliminary Report (05-15-25 @ 23:01):    No growth at 48 Hours        Blood Gas Venous - Lactate: 1.2 mmol/L (05-16-25 @ 06:00)  Blood Gas Venous - Lactate: 1.2 mmol/L (05-15-25 @ 18:02)  Blood Gas Venous - Lactate: 1.5 mmol/L (05-14-25 @ 09:24)  Lactate, Blood: 1.1 mmol/L (05-13-25 @ 19:55)  Blood Gas Venous - Lactate: 1.3 mmol/L (05-13-25 @ 15:59)  Lactate, Blood: 1.2 mmol/L (05-13-25 @ 15:45)      INFECTIOUS DISEASES TESTING  MRSA PCR Result.: Negative (05-14-25 @ 07:20)  MRSA PCR Result.: Negative (04-14-25 @ 17:16)  Procalcitonin: 5.22 (11-05-24 @ 19:32)  MRSA PCR Result.: Negative (11-04-24 @ 13:15)      INFLAMMATORY MARKERS  Sedimentation Rate, Erythrocyte: 45 mm/hr (05-14-25 @ 22:15)  C-Reactive Protein: 154.6 mg/L (05-14-25 @ 22:15)      RADIOLOGY & ADDITIONAL TESTS:  I have personally reviewed the last available Chest xray  CXR  Xray Chest 1 View- PORTABLE-Urgent:   ACC: [id number] EXAM:  XR CHEST PORTABLE URGENT 1V   ORDERED BY: SENIA CONDON     PROCEDURE DATE:  05/14/2025          INTERPRETATION:  CLINICAL HISTORY: Support devices positioning, status   post ex lap.    COMPARISON: Chest radiograph dated5/13/2025.    TECHNIQUE: Portable frontal chest radiograph.    FINDINGS:    Support devices: Overlying telemetry leads. Enteric tube courses below   the diaphragm with tip outside the field of view. Partially visualized   tracheostomy catheter terminates 6.7 cm above the tor. Left internal   jugular central venous catheter terminating very proximally, possibly in   the left IJ.    Cardiac/mediastinum/hilum: Unchanged.    Lung parenchyma/Pleura: Bibasilar opacities. No pneumothorax.    Skeleton/soft tissues: Unchanged.      IMPRESSION:    Left internal jugular central venous catheter terminates very proximally,   possibly in the left IJ.   Enteric tube extends below diaphragm. Post tracheostomy.    Communication: The summary of above findings were discussed with readback   confirmation with Dr. Maxime Giles by resident Jimy Teran MD on   5/14/2025 at 8:59 AM.    --- End of Report ---          JIMY TERAN MD; Resident Radiologist  This document has been electronically signed.  CHRISSY PRITCHARD; Attending Radiologist  This document has been electronically signed. May 14 2025 12:05PM (05-14-25 @ 06:24)      CT  CT Abdomen and Pelvis w/ IV Cont:   ACC: [id number] EXAM:  CT ABDOMEN AND PELVIS IC   ORDERED BY: ROSEANN MCNEIL     PROCEDURE DATE:  05/13/2025          INTERPRETATION:  CLINICAL INFORMATION: Abdominal pain.    COMPARISON: November 8, 2024.    CONTRAST/COMPLICATIONS:  IV Contrast:Omnipaque 350  100 cc administered   0 cc discarded  Oral Contrast: NONE    PROCEDURE:  CT of the Abdomen and Pelvis was performed.  Sagittal and coronal reformats were performed.    FINDINGS:  LOWER CHEST: Decreased small bibasilar opacities/atelectasis, right   greater than left.    LIVER: Within normal limits.  BILE DUCTS: Normal caliber.  GALLBLADDER: Status post cholecystectomy.  SPLEEN: Within normal limits.  PANCREAS: Within normal limits.  ADRENALS: Unchanged bilateral adrenal gland nodules, left greater than   right.  KIDNEYS/URETERS: Atrophic right kidney. No hydronephrosis.    BLADDER: Urinary bladder is decompressed with Burger catheter balloon in   place.  REPRODUCTIVE ORGANS: Calcified uterine fibroid.    BOWEL: New moderate mural thickening and inflammation involving the   proximal duodenum, with focal defect/outpouching along lateral duodenal   wall, compatible with perforated duodenal ulcer (4/131.) Distal aspect of   a gastrojejunostomy tube is coiled within the stomach. No evidence of   bowel obstruction.  PERITONEUM/RETROPERITONEUM: No focal drainable fluid collection.  VESSELS: Scattered atherosclerotic vascular calcification.  LYMPH NODES: No enlarged abdominal or pelvic lymph nodes.  ABDOMINAL WALL: Within normallimits.  BONES: Degenerative changes of the spine. No acute osseous abnormality.    IMPRESSION:    *1. Since November 8, 2024, new moderate mural thickening and   inflammation involving the proximal proximal duodenal wall, with focal   defect/outpouching along lateral duodenum, compatible with perforated   duodenal ulcer; no focal drainable fluid collection.  2. Additional chronic/incidental findings, as detailed above.      *Spoke with DR. BURNS of the ED on 5/13/2025 7:25 PM with readback.    --- End of Report ---            JET WALDEN MD; Attending Radiologist  This document has been electronically signed. May 13 2025  7:43PM (05-13-25 @ 18:16)      CARDIOLOGY TESTING  12 Lead ECG:   Ventricular Rate 83 BPM    QRS Duration 104 ms    Q-T Interval 380 ms    QTC Calculation(Bazett) 446 ms    R Axis -22 degrees    T Axis 28 degrees    Diagnosis Line Atrial fibrillation  Low voltage QRS  Septal infarct , age undetermined  Abnormal ECG    Confirmed by Sanjeev Pierce ([id number]) on 5/14/2025 6:59:31 PM (05-13-25 @ 23:32)  12 Lead ECG:   Ventricular Rate 82 BPM    QRS Duration 104 ms    Q-T Interval 396 ms    QTC Calculation(Bazett) 462 ms    R Axis 144 degrees    T Axis 33 degrees    Diagnosis Line Atrial fibrillation  Right axis deviation  Low voltage QRS  Abnormal ECG    Confirmed by Sanjeev Pierce ([id number]) on 5/14/2025 6:56:22 PM (05-13-25 @ 17:11)      MEDICATIONS  acetaminophen   IVPB .. 1000  caspofungin IVPB 50  caspofungin IVPB   chlorhexidine 0.12% Liquid 15  chlorhexidine 2% Cloths 1  dexMEDEtomidine Infusion 0.2  heparin   Injectable 5000  lactated ringers. 1000  norepinephrine Infusion 0.05  nystatin Powder 1  pantoprazole  Injectable 40  piperacillin/tazobactam IVPB.. 3.375  remdesivir  IVPB 100  vasopressin Infusion. 0.03      WEIGHT  Weight (kg): 87 (05-14-25 @ 00:10)  Creatinine: 1.4 mg/dL (05-15-25 @ 18:04)      ANTIBIOTICS:  caspofungin IVPB 50 milliGRAM(s) IV Intermittent every 24 hours  caspofungin IVPB      piperacillin/tazobactam IVPB.. 3.375 Gram(s) IV Intermittent every 8 hours  remdesivir  IVPB 100 milliGRAM(s) IV Intermittent every 24 hours      All available historical records have been reviewed

## 2025-05-16 NOTE — BRIEF OPERATIVE NOTE - OPERATION/FINDINGS
prepyloric ulcer 4 cm perforation anterior stomach s/p antrectomy, D1 stapled off due to friable tissue to ensure healthy tissue for staple line. Temporary abdominal closure due to HD instability.
Second look laparotomy. Abthera removed. Packing removed. Inspected duodenal stump, no bile leak. Irrigated copiously. Billroth II reconstruction performed, 40 cm from LoT. Gregorio drain placed overlying the anastomosis and duodenal stump. Gastrojejunostomy tube replaced with a jejunal feeding tube. Abdomen closed.

## 2025-05-16 NOTE — PROGRESS NOTE ADULT - ATTENDING COMMENTS
Visited pt at bedside. Denies any pain during visit. Rest of ROS limited.     PE:  General; female, in bed, comfortable  Head; NC/AT  Heart: RRR  Lungs; even chest rise, on trach-collar  Abdomen: soft, obese abdomen, abthera in place, good seal, PEG in place    A/P:  74 F with perforated ulcer s/p antrectomy, left in discontinuity.   - Wean vasopressin/levophed  - Pending takeback to OR today for anastomosis creation and abdominal wall closure   - Continue DVT PPx w/HSQ  - ABG  - IVF hydration   - AM CXR  - NPO, vent G portion of G-J  - Analgesia w/tylenol/oxy  - Continue meropenem/caspo  - Monitor urine output  - CBG goals <180  - K to 4, Mg to 2, P to 3

## 2025-05-16 NOTE — PROGRESS NOTE ADULT - SUBJECTIVE AND OBJECTIVE BOX
Patient is a 74y old  Female who presents with a chief complaint of perforated prepyloric ulcer (15 May 2025 14:42)    HPI:  Pt is 81F PMHx HTN, a-fib (on xarelto), solitary kidney, DM, GERD, prior open cholecystectomy c/b suture granulomas s/p 2019 abdominal wall debridements by Dr. Banda, respiratory distress with prolonged ventilation during 11/2024 admission, s/p 11/19 tracheostomy and gastrostomy and feeding jejunostomy tube by Dr. Champion, ya catheter dependence who presented to the ED from NH on 5/13 with 4 days progressively worsening abdominal pain. Surgery consulted for imaging concerning for duodenal perforation. History limited by pt's minimally-verbal status. Spoke with daughter on phone, pt is full code at this time, (13 May 2025 21:02)      SUBJ:  Patient seen and examined. Remains on the vet. Hemodynamically unstable with pressor support. PVC, NSVT on tele      MEDICATIONS  (STANDING):  acetaminophen   IVPB .. 1000 milliGRAM(s) IV Intermittent once  caspofungin IVPB 50 milliGRAM(s) IV Intermittent every 24 hours  caspofungin IVPB      chlorhexidine 0.12% Liquid 15 milliLiter(s) Oral Mucosa every 12 hours  chlorhexidine 2% Cloths 1 Application(s) Topical <User Schedule>  dexMEDEtomidine Infusion 0.2 MICROgram(s)/kG/Hr (4.35 mL/Hr) IV Continuous <Continuous>  heparin   Injectable 5000 Unit(s) SubCutaneous every 8 hours  lactated ringers. 1000 milliLiter(s) (110 mL/Hr) IV Continuous <Continuous>  norepinephrine Infusion 0.05 MICROgram(s)/kG/Min (4.08 mL/Hr) IV Continuous <Continuous>  nystatin Powder 1 Application(s) Topical two times a day  pantoprazole  Injectable 40 milliGRAM(s) IV Push every 12 hours  piperacillin/tazobactam IVPB.. 3.375 Gram(s) IV Intermittent every 8 hours  remdesivir  IVPB 100 milliGRAM(s) IV Intermittent every 24 hours  vasopressin Infusion. 0.03 Unit(s)/Min (4.5 mL/Hr) IV Continuous <Continuous>    MEDICATIONS  (PRN):  fentaNYL    Injectable 12.5 MICROGram(s) IV Push every 4 hours PRN Moderate Pain (4 - 6)  fentaNYL    Injectable 25 MICROGram(s) IV Push every 4 hours PRN Severe Pain (7 - 10)  sodium chloride 0.9% lock flush 10 milliLiter(s) IV Push every 1 hour PRN Pre/post blood products, medications, blood draw, and to maintain line patency            Vital Signs Last 24 Hrs  T(C): 36 (16 May 2025 04:00), Max: 37.2 (15 May 2025 12:00)  T(F): 96.8 (16 May 2025 04:00), Max: 99 (15 May 2025 12:00)  HR: 54 (16 May 2025 07:00) (54 - 80)  BP: 105/59 (16 May 2025 07:00) (97/52 - 127/57)  BP(mean): 75 (16 May 2025 07:00) (66 - 91)  RR: --  SpO2: 99% (16 May 2025 07:00) (97% - 100%)          PHYSICAL EXAM:    GEN: trach, non-verbal  HEENT: NC/AT  Neck: trach in place  CV:  S1-S2  Lungs: vent sounds  Abd: Soft          05-15-25 @ 07:01  -  05-16-25 @ 07:00  --------------------------------------------------------  IN: 3921.2 mL / OUT: 2780 mL / NET: 1141.2 mL        I&O's Summary    15 May 2025 07:01  -  16 May 2025 07:00  --------------------------------------------------------  IN: 3921.2 mL / OUT: 2780 mL / NET: 1141.2 mL    	    TELEMETRY:    ECG:    TTE:      LABS:                        8.1    16.67 )-----------( 297      ( 15 May 2025 18:04 )             25.0     05-15    139  |  105  |  35[H]  ----------------------------<  147[H]  3.7   |  22  |  1.4    Ca    9.0      15 May 2025 18:04  Phos  2.8     05-15  Mg     2.1     05-15    TPro  4.9[L]  /  Alb  2.7[L]  /  TBili  0.5  /  DBili  0.2  /  AST  20  /  ALT  16  /  AlkPhos  100  05-14        PT/INR - ( 15 May 2025 21:09 )   PT: 15.50 sec;   INR: 1.31 ratio         PTT - ( 15 May 2025 21:09 )  PTT:29.2 sec      BNP  RADIOLOGY & ADDITIONAL STUDIES:      IMPRESSION AND PLAN:

## 2025-05-16 NOTE — CHART NOTE - NSCHARTNOTEFT_GEN_A_CORE
PATRICIA SPRAGUE   [id number] (1950)    Procedure: second look laparotomy, bilroth II, replacement of GJ tube with J tube. Abdomen closed.       OR time: 1.5 hours  EBL: 30cc  IVF:  800cc  UOP: 300cc    Operative findings: Second look laparotomy. Abthera removed. Packing removed. Inspected duodenal stump, no bile leak. Irrigated copiously. Billroth II reconstruction performed, 40 cm from LoT. Gregorio drain placed overlying the anastomosis and duodenal stump. Gastrojejunostomy tube replaced with a jejunal feeding tube. Abdomen closed.      T(C): 36.4 (05-16), Max: 36.6 (05-15)  HR: 67 (05-16) (53 - 73)  BP: 101/57 (05-16) (99/55 - 127/57)  BP(mean): 72 (05-16) (68 - 86)  ABP: 129/58 (05-16) (47/31 - 145/59)  ABP(mean): 79 (05-16) (36 - 86)  RR: 18 (05-16) (18 - 18)  SpO2: 100% (05-16) (89% - 100%)    Physical Exam:  Neuro: Sedated, PERRLA  Chest: CTAB/L  Card: Afib  GI: Abdomen soft, midline incision c/d/i. BULMARO drain serosanguinous. J tube in place.   : ya in place      A/P  - STAT labs  - ABG  - EKG  - Wean pressors   - Post op CXR and KUB

## 2025-05-16 NOTE — BRIEF OPERATIVE NOTE - NSICDXBRIEFPOSTOP_GEN_ALL_CORE_FT
POST-OP DIAGNOSIS:  Perforated peptic ulcer 14-May-2025 03:37:26  Carlos Cheatham  
POST-OP DIAGNOSIS:  Perforated peptic ulcer 14-May-2025 03:37:26  Carlos Cheatham

## 2025-05-16 NOTE — DIETITIAN INITIAL EVALUATION ADULT - PERTINENT MEDS FT
MEDICATIONS  (STANDING):  acetaminophen   IVPB .. 1000 milliGRAM(s) IV Intermittent once  caspofungin IVPB 50 milliGRAM(s) IV Intermittent every 24 hours  caspofungin IVPB      chlorhexidine 0.12% Liquid 15 milliLiter(s) Oral Mucosa every 12 hours  chlorhexidine 2% Cloths 1 Application(s) Topical <User Schedule>  dexMEDEtomidine Infusion 0.2 MICROgram(s)/kG/Hr (4.35 mL/Hr) IV Continuous <Continuous>  heparin   Injectable 5000 Unit(s) SubCutaneous every 8 hours  lactated ringers. 1000 milliLiter(s) (110 mL/Hr) IV Continuous <Continuous>  norepinephrine Infusion 0.05 MICROgram(s)/kG/Min (4.08 mL/Hr) IV Continuous <Continuous>  nystatin Powder 1 Application(s) Topical two times a day  pantoprazole  Injectable 40 milliGRAM(s) IV Push every 12 hours  piperacillin/tazobactam IVPB.. 3.375 Gram(s) IV Intermittent every 8 hours  remdesivir  IVPB 100 milliGRAM(s) IV Intermittent every 24 hours  vasopressin Infusion. 0.03 Unit(s)/Min (4.5 mL/Hr) IV Continuous <Continuous>    MEDICATIONS  (PRN):  fentaNYL    Injectable 12.5 MICROGram(s) IV Push every 4 hours PRN Moderate Pain (4 - 6)  fentaNYL    Injectable 25 MICROGram(s) IV Push every 4 hours PRN Severe Pain (7 - 10)  sodium chloride 0.9% lock flush 10 milliLiter(s) IV Push every 1 hour PRN Pre/post blood products, medications, blood draw, and to maintain line patency

## 2025-05-16 NOTE — DIETITIAN INITIAL EVALUATION ADULT - ORAL INTAKE PTA/DIET HISTORY
Pt unable to participate in nutrition assessment interview at time of visit; intubated. Family at bedside, however unable to obtain nutrition hx. Discussed with emergency contact at number listed in EMR. Per family, pt has a PEG-J tube, however the tube has not been used for 2 month prior to admit. Pt was consuming food by mouth. Texture of diet pta was pureed w/ thickened liquids. Denies drinking protein shake supplements. Denies taking vitamin or mineral supplements. NKFA; denies any restrictive cultural or Jainism food preferences.

## 2025-05-16 NOTE — BRIEF OPERATIVE NOTE - NSICDXBRIEFPROCEDURE_GEN_ALL_CORE_FT
PROCEDURES:  Laparotomy, second look, with packing removal 16-May-2025 18:34:38  Sideny Khan  Billroth II operation 16-May-2025 18:34:52  Sidney Khan  Replacement of gastrojejunostomy tube 16-May-2025 18:35:16  Sidney Khan  
PROCEDURES:  Partial antrectomy 14-May-2025 03:37:14  Carlos Cheatham

## 2025-05-16 NOTE — PROGRESS NOTE ADULT - ATTENDING COMMENTS
OR today. Discussed with patient's son and daughter OR plan. She is at elevated risk for complication, including anastomotic leak.

## 2025-05-16 NOTE — BRIEF OPERATIVE NOTE - NSICDXBRIEFPREOP_GEN_ALL_CORE_FT
PRE-OP DIAGNOSIS:  Perforated peptic ulcer 14-May-2025 03:37:24  Carlos Cheatham  
PRE-OP DIAGNOSIS:  Perforated peptic ulcer 14-May-2025 03:37:24  Carlos Cheatham

## 2025-05-16 NOTE — DIETITIAN INITIAL EVALUATION ADULT - PERTINENT LABORATORY DATA
05-15    139  |  105  |  35[H]  ----------------------------<  147[H]  3.7   |  22  |  1.4    Ca    9.0      15 May 2025 18:04  Phos  2.8     05-15  Mg     2.1     05-15    TPro  4.9[L]  /  Alb  2.7[L]  /  TBili  0.5  /  DBili  0.2  /  AST  20  /  ALT  16  /  AlkPhos  100  05-14  POCT Blood Glucose.: 139 mg/dL (05-16-25 @ 11:52)  A1C with Estimated Average Glucose Result: 5.1 % (04-11-25 @ 06:04)  A1C with Estimated Average Glucose Result: 4.6 % (12-05-24 @ 07:54)

## 2025-05-16 NOTE — PROGRESS NOTE ADULT - ASSESSMENT
75 yo female PMH HTN, a-fib (on xarelto), solitary kidney, DM, GERD, prior open cholecystectomy c/b suture granulomas s/p 2019 abdominal wall debridements by Dr. Banda, respiratory distress with prolonged ventilation during 11/2024 admission, s/p 11/19 tracheostomy and gastrostomy and feeding jejunostomy tube by Dr. Champion, ya catheter dependence who presented from the nursing home with abdominal pain and was admitted to SICU for perforation of prepyloric ulcer, now s/p ex-lap with antrectomy and D1 stapling. Currently in septic shock in the SICU on levo and vaso. Also has covid and positive Urine Culture. Being treated with zosyn, caspo and RDV. Cardio is consulted for concerning ECG with afib and PVCs and concern for VT overnight on tele. Patient is minimally verbal and a poor historian. Home cardiac meds are xarelto and losartan 100 which are currently being held. Repeat echo - severe LV dysfunction, EF 30%    #Septic Shock- Perforated gastric ulcer, Covid, Positive Urine Culture  #Chronic Afib w PVCs  #NSVT  #HFrEF with multiple RWMA- most likely sepsis-induced stress cardiomyopathy, less likely to be ischemic  #HTN  -Tele Reviewed, shows rate-controlled afib and PVCs and 2 episodes of NSVT  -ECG shows rate-controlled afib and PVCs and is similar to prior ECGs  -MNGMT of septic shock per SICU  - antibiotic therapy and wound care as per primary team  - c/w vent support, hemodynamic support.   -Start short-acting AC for afib  -Once sepsis is improved and off pressors, will start beta blockers for stress cardiomyopathy  -For NSVT, continue to monitor on tele, and start beta blockers when no longer septic.

## 2025-05-16 NOTE — PROGRESS NOTE ADULT - ASSESSMENT
82 yo female with extensive PMH presenting for perforated duodenal ulcer   # NOLA most likely ATN sp perforated duodenal ulcer sp OR   # acidosis  # VDRF   # sepsis   - cr trending down   - start [id number] q 8   - ph at goal   - atrophic right kidney on CT scan / no hydro   -  on remdesivir for COVID   - no need for RRT yet   will sign off recall as needed

## 2025-05-16 NOTE — PROGRESS NOTE ADULT - SUBJECTIVE AND OBJECTIVE BOX
GENERAL SURGERY PROGRESS NOTE     PATRICIA SPRAGUE  [address] [place] [address] day :4d    POD:2d  Procedure: Partial antrectomy    US Doppler guided insertion of central venous catheter      Surgical Attending: Yumi Pena  Overnight events: No acute events overnight. Pt unable to go to OR on 5/15 due to OR availability. Pt is planned to RTOR on 5/16. Pt remains intubated, sedated and mechanically ventilated. She is still requiring pressors.     T(F): 97.1 (05-16-25 @ 08:00), Max: 99 (05-15-25 @ 12:00)  HR: 55 (05-16-25 @ 09:00) (54 - 78)  BP: 121/60 (05-16-25 @ 09:00) (97/52 - 127/57)  ABP: 140/58 (05-16-25 @ 09:00) (80/68 - 145/59)  ABP(mean): 80 (05-16-25 @ 09:00) (56 - 81)  RR: --  SpO2: 97% (05-16-25 @ 09:00) (94% - 100%)    IN'S / OUT's:    05-15-25 @ 07:01  -  05-16-25 @ 07:00  --------------------------------------------------------  IN:    Dexmedetomidine: 158.2 mL    IV PiggyBack: 250 mL    IV PiggyBack: 250 mL    IV PiggyBack: 200 mL    IV PiggyBack: 50 mL    Lactated Ringers: 2640 mL    Norepinephrine: 265 mL    Vasopressin (Organ Donation): 108 mL  Total IN: 3921.2 mL    OUT:    Drain (mL): 0 mL    Nasogastric/Oral tube (mL): 150 mL    Ureteral Catheter (mL): 2480 mL    VAC (Vacuum Assisted Closure) System (mL): 150 mL  Total OUT: 2780 mL    Total NET: 1141.2 mL      05-16-25 @ 07:01  -  05-16-25 @ 09:34  --------------------------------------------------------  IN:    Dexmedetomidine: 13 mL    Lactated Ringers: 220 mL    Norepinephrine: 17.8 mL    Vasopressin (Organ Donation): 9 mL  Total IN: 259.8 mL    OUT:    Ureteral Catheter (mL): 50 mL  Total OUT: 50 mL    Total NET: 209.8 mL          PHYSICAL EXAM:  GENERAL: NAD, Intubated and sedated    CHEST/LUNG: equal chest rise b/l, mechanically ventilated  HEART: Regular rate and rhythm  ABDOMEN: Soft, Nontender, Nondistended; Abthera in place  EXTREMITIES:  No clubbing, cyanosis, or edema      LABS  Labs:  CAPILLARY BLOOD GLUCOSE      POCT Blood Glucose.: 126 mg/dL (16 May 2025 05:08)  POCT Blood Glucose.: 151 mg/dL (15 May 2025 23:01)  POCT Blood Glucose.: 133 mg/dL (15 May 2025 15:04)                          8.1    16.67 )-----------( 297      ( 15 May 2025 18:04 )             25.0         05-15    139  |  105  |  35[H]  ----------------------------<  147[H]  3.7   |  22  |  1.4      Calcium: 9.0 mg/dL (05-15-25 @ 18:04)      LFTs:             4.9  | 0.5  | 20       ------------------[100     ( 14 May 2025 22:15 )  2.7  | 0.2  | 16          Lipase:x      Amylase:x         Blood Gas Venous - Lactate: 1.2 mmol/L (05-16-25 @ 06:00)  Blood Gas Venous - Lactate: 1.2 mmol/L (05-15-25 @ 18:02)  Blood Gas Arterial, Lactate: 1.1 mmol/L (05-15-25 @ 05:05)  Blood Gas Arterial, Lactate: 1.3 mmol/L (05-14-25 @ 21:52)  Blood Gas Arterial, Lactate: 1.3 mmol/L (05-14-25 @ 18:16)  Blood Gas Arterial, Lactate: 1.1 mmol/L (05-14-25 @ 12:37)  Blood Gas Arterial, Lactate: 1.2 mmol/L (05-14-25 @ 09:47)  Blood Gas Venous - Lactate: 1.5 mmol/L (05-14-25 @ 09:24)  Blood Gas Arterial, Lactate: 1.2 mmol/L (05-14-25 @ 06:58)  Blood Gas Arterial, Lactate: 1.0 mmol/L (05-14-25 @ 04:17)  Lactate, Blood: 1.1 mmol/L (05-13-25 @ 19:55)  Blood Gas Venous - Lactate: 1.3 mmol/L (05-13-25 @ 15:59)  Lactate, Blood: 1.2 mmol/L (05-13-25 @ 15:45)    ABG - ( 15 May 2025 05:05 )  pH: 7.42  /  pCO2: 29    /  pO2: 90    / HCO3: 19    / Base Excess: -4.9  /  SaO2: 98.4            ABG - ( 14 May 2025 21:52 )  pH: 7.36  /  pCO2: 31    /  pO2: 102   / HCO3: 18    / Base Excess: -7.1  /  SaO2: 98.3            ABG - ( 14 May 2025 18:16 )  pH: 7.36  /  pCO2: 30    /  pO2: 115   / HCO3: 17    / Base Excess: -7.3  /  SaO2: 98.6              Coags:     15.50  ----< 1.31    ( 15 May 2025 21:09 )     29.2                Urinalysis Basic - ( 15 May 2025 18:04 )    Color: x / Appearance: x / SG: x / pH: x  Gluc: 147 mg/dL / Ketone: x  / Bili: x / Urobili: x   Blood: x / Protein: x / Nitrite: x   Leuk Esterase: x / RBC: x / WBC x   Sq Epi: x / Non Sq Epi: x / Bacteria: x        Urinalysis with Rflx Culture (collected 13 May 2025 17:22)    Culture - Urine (collected 13 May 2025 17:22)  Source: Catheterized None  Preliminary Report (15 May 2025 00:33):    >100,000 CFU/ml Escherichia coli    Culture - Blood (collected 13 May 2025 15:45)  Source: Blood Blood-Peripheral  Preliminary Report (15 May 2025 23:01):    No growth at 48 Hours    Culture - Blood (collected 13 May 2025 15:35)  Source: Blood Blood-Peripheral  Preliminary Report (15 May 2025 23:01):    No growth at 48 Hours          RADIOLOGY & ADDITIONAL TESTS:      A/P:  PATRICIA SPRAGUE is a 74F found to have perforated peptic ulcer now s/p ex lap, distal antrectomy, abthera placement requiring vasopressors.        PLAN:   - Plan for return to OR 5/16, OR cancelled yesterday due to OR availability  - Continue Abx per ID recs  - Cardiology recs appreciated - start short acting AC, Start Beta blocker once sepsis resolves   - wean vent settings as tolerated   - wean pressors as tolerated   - daily labs, replete electrolytes as needed   - multi modal pain control  - rest of care per SICU      #Antibiotics: caspofungin IVPB 50 milliGRAM(s) IV Intermittent every 24 hours, 05-14-25 @ 23:30  caspofungin IVPB    , 05-13-25 @ 23:30  piperacillin/tazobactam IVPB.. 3.375 Gram(s) IV Intermittent every 8 hours, 05-14-25 @ 14:46  remdesivir  IVPB 100 milliGRAM(s) IV Intermittent every 24 hours, 05-15-25 @ 00:00   Day **  #DVT ppx: heparin   Injectable 5000 Unit(s) SubCutaneous every 8 hours    #GI ppx: pantoprazole  Injectable 40 milliGRAM(s) IV Push every 12 hours    Disposition:  SICU    Above plan to be discussed with Attending Surgeon Dr. Tyson  , patient, patient family, and rest of health care team    TAP (Trauma, Acute care, Pediatrics) Spectra [phone]

## 2025-05-16 NOTE — PROGRESS NOTE ADULT - ASSESSMENT
ASSESSMENT  81F PMHx HTN, a-fib (on xarelto), solitary kidney, DM, GERD, prior open cholecystectomy c/b suture granulomas s/p 2019 abdominal wall debridements by Dr. Banda, respiratory distress with prolonged ventilation during 11/2024 admission, s/p 11/19 tracheostomy and gastrostomy and feeding jejunostomy tube by Dr. Champion, ya catheter dependence who presented to the ED from NH on 5/13 with 4 days progressively worsening abdominal pain. Surgery consulted for imaging concerning for duodenal perforation. S/p OR, ID consulted for antimicrobial management       IMPRESSION  #Perforated Duodenal Ulcer, septic shock requiring pressors     Afebrile; Admission WBC 18    5/13 BCX NGTD     5/13 UCX   >100,000 CFU/ml Escherichia coli  Procedure	PROCEDURES:  Partial antrectomy 14-May-2025 03:37:14  Carlos Cheatham  Operative Findings	prepyloric ulcer 4 cm perforation anterior stomach s/p antrectomy, D1 stapled off due to friable tissue to ensure healthy tissue for staple line. Temporary abdominal closure due to HD instability.  < from: CT Abdomen and Pelvis w/ IV Cont (05.13.25 @ 18:16) >  *1. Since November 8, 2024, new moderate mural thickening and inflammation involving the proximal proximal duodenal wall, with focal   defect/outpouching along lateral duodenum, compatible with perforated duodenal ulcer; no focal drainable fluid collection.  2. Additional chronic/incidental findings, as detailed above.  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 which could result in poor clinical outcomes  #Abx allergy: No Known Allergies  #Solitary kidney/ NOLA Creatinine: 1.9 mg/dL (05-14-25 @ 22:15)    Height (cm): 167.6 (05-14-25 @ 00:10)  Weight (kg): 87 (05-14-25 @ 00:10)    RECOMMENDATIONS  - f/u ecoli sensitivities   - Continue Zosyn 3.375 q8h IV   - Continue caspofungin IVPB 50 milliGRAM(s) IV Intermittent every 24 hours  - -mg x1 then 100mg x 2 days  - F/u OR path, no OR cx pending    If any questions, please send a message or call on [employer] Teams  Please continue to update ID with any pertinent new laboratory, radiographic findings, or change in clinical status   ASSESSMENT  81F PMHx HTN, a-fib (on xarelto), solitary kidney, DM, GERD, prior open cholecystectomy c/b suture granulomas s/p 2019 abdominal wall debridements by Dr. Banda, respiratory distress with prolonged ventilation during 11/2024 admission, s/p 11/19 tracheostomy and gastrostomy and feeding jejunostomy tube by Dr. Champion, ya catheter dependence who presented to the ED from NH on 5/13 with 4 days progressively worsening abdominal pain. Surgery consulted for imaging concerning for duodenal perforation. S/p OR, ID consulted for antimicrobial management       IMPRESSION  #Perforated Duodenal Ulcer, septic shock requiring pressors     Afebrile; Admission WBC 18    5/13 BCX NGTD     5/13 UCX   >100,000 CFU/ml Escherichia coli ESBL S zosyn  Procedure	PROCEDURES:  Partial antrectomy 14-May-2025 03:37:14  Carlos Cheatham  Operative Findings	prepyloric ulcer 4 cm perforation anterior stomach s/p antrectomy, D1 stapled off due to friable tissue to ensure healthy tissue for staple line. Temporary abdominal closure due to HD instability.  < from: CT Abdomen and Pelvis w/ IV Cont (05.13.25 @ 18:16) >  *1. Since November 8, 2024, new moderate mural thickening and inflammation involving the proximal proximal duodenal wall, with focal   defect/outpouching along lateral duodenum, compatible with perforated duodenal ulcer; no focal drainable fluid collection.  2. Additional chronic/incidental findings, as detailed above.  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    Height (cm): 167.6 (05-14-25 @ 00:10)  Weight (kg): 87 (05-14-25 @ 00:10)    RECOMMENDATIONS  - Given continued shock on 2 pressors, d/c zosyn--> Meropenem 1g q12h IV (ESBL in UCX)  - Continue caspofungin IVPB 50 milliGRAM(s) IV Intermittent every 24 hours  - RDV to complete 3 days total  - F/u OR path, no OR cx pending    If any questions, please send a message or call on [employer] Teams  Please continue to update ID with any pertinent new laboratory, radiographic findings, or change in clinical status

## 2025-05-17 LAB
ANION GAP SERPL CALC-SCNC: 12 MMOL/L — SIGNIFICANT CHANGE UP (ref 7–14)
BUN SERPL-MCNC: 30 MG/DL — HIGH (ref 10–20)
CALCIUM SERPL-MCNC: 10.2 MG/DL — SIGNIFICANT CHANGE UP (ref 8.4–10.5)
CHLORIDE SERPL-SCNC: 107 MMOL/L — SIGNIFICANT CHANGE UP (ref 98–110)
CO2 SERPL-SCNC: 19 MMOL/L — SIGNIFICANT CHANGE UP (ref 17–32)
CREAT SERPL-MCNC: 1.1 MG/DL — SIGNIFICANT CHANGE UP (ref 0.7–1.5)
EGFR: 53 ML/MIN/1.73M2 — LOW
EGFR: 53 ML/MIN/1.73M2 — LOW
GAS PNL BLDA: SIGNIFICANT CHANGE UP
GAS PNL BLDA: SIGNIFICANT CHANGE UP
GLUCOSE SERPL-MCNC: 118 MG/DL — HIGH (ref 70–99)
HCT VFR BLD CALC: 25.4 % — LOW (ref 37–47)
HGB BLD-MCNC: 8.4 G/DL — LOW (ref 12–16)
MAGNESIUM SERPL-MCNC: 2.7 MG/DL — HIGH (ref 1.8–2.4)
MCHC RBC-ENTMCNC: 28.4 PG — SIGNIFICANT CHANGE UP (ref 27–31)
MCHC RBC-ENTMCNC: 33.1 G/DL — SIGNIFICANT CHANGE UP (ref 32–37)
MCV RBC AUTO: 85.8 FL — SIGNIFICANT CHANGE UP (ref 81–99)
NRBC BLD AUTO-RTO: 0 /100 WBCS — SIGNIFICANT CHANGE UP (ref 0–0)
PHOSPHATE SERPL-MCNC: 3.2 MG/DL — SIGNIFICANT CHANGE UP (ref 2.1–4.9)
PLATELET # BLD AUTO: 162 K/UL — SIGNIFICANT CHANGE UP (ref 130–400)
PMV BLD: 10 FL — SIGNIFICANT CHANGE UP (ref 7.4–10.4)
POTASSIUM SERPL-MCNC: 3.9 MMOL/L — SIGNIFICANT CHANGE UP (ref 3.5–5)
POTASSIUM SERPL-SCNC: 3.9 MMOL/L — SIGNIFICANT CHANGE UP (ref 3.5–5)
RBC # BLD: 2.96 M/UL — LOW (ref 4.2–5.4)
RBC # FLD: 15.7 % — HIGH (ref 11.5–14.5)
SODIUM SERPL-SCNC: 138 MMOL/L — SIGNIFICANT CHANGE UP (ref 135–146)
WBC # BLD: 11.98 K/UL — HIGH (ref 4.8–10.8)
WBC # FLD AUTO: 11.98 K/UL — HIGH (ref 4.8–10.8)

## 2025-05-17 PROCEDURE — 99291 CRITICAL CARE FIRST HOUR: CPT | Mod: 25

## 2025-05-17 PROCEDURE — 71045 X-RAY EXAM CHEST 1 VIEW: CPT | Mod: 26

## 2025-05-17 RX ORDER — OXYCODONE HYDROCHLORIDE 30 MG/1
2.5 TABLET ORAL EVERY 6 HOURS
Refills: 0 | Status: DISCONTINUED | OUTPATIENT
Start: 2025-05-17 | End: 2025-05-18

## 2025-05-17 RX ORDER — OXYCODONE HYDROCHLORIDE 30 MG/1
5 TABLET ORAL EVERY 4 HOURS
Refills: 0 | Status: DISCONTINUED | OUTPATIENT
Start: 2025-05-17 | End: 2025-05-18

## 2025-05-17 RX ORDER — ACETAMINOPHEN 500 MG/5ML
650 LIQUID (ML) ORAL EVERY 6 HOURS
Refills: 0 | Status: DISCONTINUED | OUTPATIENT
Start: 2025-05-17 | End: 2025-05-18

## 2025-05-17 RX ADMIN — Medication 650 MILLIGRAM(S): at 13:45

## 2025-05-17 RX ADMIN — NYSTATIN 1 APPLICATION(S): 100000 CREAM TOPICAL at 17:16

## 2025-05-17 RX ADMIN — MEROPENEM 100 MILLIGRAM(S): 1 INJECTION INTRAVENOUS at 05:09

## 2025-05-17 RX ADMIN — HEPARIN SODIUM 5000 UNIT(S): 1000 INJECTION INTRAVENOUS; SUBCUTANEOUS at 23:05

## 2025-05-17 RX ADMIN — HEPARIN SODIUM 5000 UNIT(S): 1000 INJECTION INTRAVENOUS; SUBCUTANEOUS at 05:09

## 2025-05-17 RX ADMIN — NYSTATIN 1 APPLICATION(S): 100000 CREAM TOPICAL at 05:09

## 2025-05-17 RX ADMIN — Medication 12.5 MICROGRAM(S): at 08:56

## 2025-05-17 RX ADMIN — Medication 15 MILLILITER(S): at 17:15

## 2025-05-17 RX ADMIN — Medication 650 MILLIGRAM(S): at 17:15

## 2025-05-17 RX ADMIN — Medication 15 MILLILITER(S): at 05:09

## 2025-05-17 RX ADMIN — Medication 1 PUFF(S): at 02:39

## 2025-05-17 RX ADMIN — Medication 650 MILLIGRAM(S): at 12:58

## 2025-05-17 RX ADMIN — Medication 2 PUFF(S): at 20:29

## 2025-05-17 RX ADMIN — OXYCODONE HYDROCHLORIDE 5 MILLIGRAM(S): 30 TABLET ORAL at 23:04

## 2025-05-17 RX ADMIN — Medication 650 MILLIGRAM(S): at 18:00

## 2025-05-17 RX ADMIN — Medication 12.5 MICROGRAM(S): at 09:15

## 2025-05-17 RX ADMIN — MEROPENEM 100 MILLIGRAM(S): 1 INJECTION INTRAVENOUS at 17:15

## 2025-05-17 RX ADMIN — Medication 650 MILLIGRAM(S): at 23:04

## 2025-05-17 RX ADMIN — Medication 1 PUFF(S): at 20:28

## 2025-05-17 RX ADMIN — Medication 1 PUFF(S): at 13:59

## 2025-05-17 RX ADMIN — OXYCODONE HYDROCHLORIDE 5 MILLIGRAM(S): 30 TABLET ORAL at 23:06

## 2025-05-17 RX ADMIN — CASPOFUNGIN ACETATE 260 MILLIGRAM(S): 5 INJECTION, POWDER, LYOPHILIZED, FOR SOLUTION INTRAVENOUS at 23:06

## 2025-05-17 RX ADMIN — Medication 25 MICROGRAM(S): at 02:07

## 2025-05-17 RX ADMIN — Medication 40 MILLIGRAM(S): at 17:15

## 2025-05-17 RX ADMIN — Medication 25 MICROGRAM(S): at 02:37

## 2025-05-17 RX ADMIN — CASPOFUNGIN ACETATE 260 MILLIGRAM(S): 5 INJECTION, POWDER, LYOPHILIZED, FOR SOLUTION INTRAVENOUS at 00:00

## 2025-05-17 RX ADMIN — Medication 2 PUFF(S): at 08:26

## 2025-05-17 RX ADMIN — OXYCODONE HYDROCHLORIDE 2.5 MILLIGRAM(S): 30 TABLET ORAL at 16:01

## 2025-05-17 RX ADMIN — Medication 1 APPLICATION(S): at 05:09

## 2025-05-17 RX ADMIN — Medication 1 PUFF(S): at 08:27

## 2025-05-17 RX ADMIN — OXYCODONE HYDROCHLORIDE 5 MILLIGRAM(S): 30 TABLET ORAL at 18:51

## 2025-05-17 RX ADMIN — OXYCODONE HYDROCHLORIDE 2.5 MILLIGRAM(S): 30 TABLET ORAL at 16:40

## 2025-05-17 RX ADMIN — Medication 650 MILLIGRAM(S): at 23:57

## 2025-05-17 RX ADMIN — Medication 2 PUFF(S): at 13:59

## 2025-05-17 RX ADMIN — Medication 40 MILLIGRAM(S): at 05:08

## 2025-05-17 RX ADMIN — HEPARIN SODIUM 5000 UNIT(S): 1000 INJECTION INTRAVENOUS; SUBCUTANEOUS at 13:59

## 2025-05-17 RX ADMIN — Medication 2 PUFF(S): at 02:39

## 2025-05-17 NOTE — PROGRESS NOTE ADULT - ATTENDING COMMENTS
Visited pt at bedside. Denies any pain during visit. Able to communicate by nodding head yes/no and thumb movements.     PE:  General; female, in bed, comfortable  Head; NC/AT  Heart: RRR  Lungs; even chest rise, on trach-collar  Abdomen: soft, obese abdomen, non-tender, PEJ bumper in place, midline wound dressing clean and dry    A/P:  74 F with perforated ulcer s/p antrectomy, left in discontinuity, taken back to OR on 5/16/25 for gastro-jejunal anastomosis, drain placement.   - Wean levophed/vasopressin  - Will transfuse 1 unit pRBCs  - Continue DVT PPx w/HSQ  - Will SBT on ventilator and then proceed to trach collar if tolerating   - Will start trickle feeds via jejunostomy at this time  - Keep NG tube for decompression at this time  - Analgesia w/tylenol/oxy  - Continue meropenem/caspo  - Monitor urine output; chronic Burger exchanged today  - CBG goals <180  - K to 4, Mg to 2, P to 3

## 2025-05-17 NOTE — PROGRESS NOTE ADULT - SUBJECTIVE AND OBJECTIVE BOX
GENERAL SURGERY PROGRESS NOTE     Patient: PATRICIA SPRAGUE , 74y ([birth year])Female   MRN: [id number]  Location: [place] [facility] [address] [facility]  Visit: 05-13-25 Inpatient  Date: 05-17-25 @ 01:04        Admitted :05-13-25 (4d)  LOS: 4d    Procedure/Dx/Injuries: Perforated peptic ulcer    Perforated duodenal ulcer    Perforated peptic ulcer    Septic shock    Acute respiratory failure with hypoxia    Encounter for palliative care    Partial antrectomy    US Doppler guided insertion of central venous catheter    Laparotomy, second look, with packing removal    Billroth II operation    Replacement of gastrojejunostomy tube         Events of past 24 hours: Trached 60/10 on levo 0.08 and vaso 0.03. GJ clamped and BULMARO SS  >>> <<<>>> <<<>>> <<<>>> <<<>>> <<<>>> <<<>>> <<<>>> <<<>>> <<<>>> <<<    Vitals:   T(F): 97.9 (05-16-25 @ 20:00), Max: 97.9 (05-16-25 @ 20:00)  HR: 58 (05-17-25 @ 00:00)  BP: 101/57 (05-16-25 @ 15:13)  RR: 16 (05-17-25 @ 00:00)  SpO2: 100% (05-17-25 @ 00:00)  Mode: AC/ CMV (Assist Control/ Continuous Mandatory Ventilation), RR (machine): 16, TV (machine): 450, FiO2: 30, PEEP: 10, ITime: 0.8, MAP: 18, PIP: 24    PHYSICAL EXAM:  General: NAD AAOx3   Resp: symmetric and equal chest rise and fall on trach   Abdomen: soft, nondistended, GJ in place c/d/i  >>> <<<>>> <<<>>> <<<>>> <<<>>> <<<>>> <<<>>> <<<>>> <<<>>> <<<>>> <<<   Is & Os:   Diet, NPO:   Except Medications    Fluids:     05-15-25 @ 07:01  -  05-16-25 @ 07:00  --------------------------------------------------------  IN:    Dexmedetomidine: 158.2 mL    IV PiggyBack: 250 mL    IV PiggyBack: 250 mL    IV PiggyBack: 200 mL    IV PiggyBack: 50 mL    Lactated Ringers: 2640 mL    Norepinephrine: 265 mL    Vasopressin (Organ Donation): 108 mL  Total IN: 3921.2 mL    OUT:    Drain (mL): 0 mL    Nasogastric/Oral tube (mL): 150 mL    Ureteral Catheter (mL): 2480 mL    VAC (Vacuum Assisted Closure) System (mL): 150 mL  Total OUT: 2780 mL    Total NET: 1141.2 mL          Bowel Movement: :   Flatus: :   >>> <<<>>> <<<>>> <<<>>> <<<>>> <<<>>> <<<>>> <<<>>> <<<>>> <<<>>> <<<    MEDICATIONS  (STANDING):  acetaminophen   IVPB .. 1000 milliGRAM(s) IV Intermittent once  albuterol    90 MICROgram(s) HFA Inhaler 2 Puff(s) Inhalation every 6 hours  caspofungin IVPB 50 milliGRAM(s) IV Intermittent every 24 hours  caspofungin IVPB      chlorhexidine 0.12% Liquid 15 milliLiter(s) Oral Mucosa every 12 hours  chlorhexidine 2% Cloths 1 Application(s) Topical <User Schedule>  dexMEDEtomidine Infusion 0.2 MICROgram(s)/kG/Hr (4.35 mL/Hr) IV Continuous <Continuous>  heparin   Injectable 5000 Unit(s) SubCutaneous every 8 hours  ipratropium 17 MICROgram(s) HFA Inhaler 1 Puff(s) Inhalation every 6 hours  lactated ringers. 1000 milliLiter(s) (75 mL/Hr) IV Continuous <Continuous>  meropenem  IVPB      meropenem  IVPB 1000 milliGRAM(s) IV Intermittent every 12 hours  norepinephrine Infusion 0.05 MICROgram(s)/kG/Min (4.08 mL/Hr) IV Continuous <Continuous>  nystatin Powder 1 Application(s) Topical two times a day  pantoprazole  Injectable 40 milliGRAM(s) IV Push every 12 hours  vasopressin Infusion. 0.03 Unit(s)/Min (4.5 mL/Hr) IV Continuous <Continuous>    MEDICATIONS  (PRN):  fentaNYL    Injectable 12.5 MICROGram(s) IV Push every 4 hours PRN Moderate Pain (4 - 6)  fentaNYL    Injectable 25 MICROGram(s) IV Push every 4 hours PRN Severe Pain (7 - 10)  sodium chloride 0.9% lock flush 10 milliLiter(s) IV Push every 1 hour PRN Pre/post blood products, medications, blood draw, and to maintain line patency      DVT PROPHYLAXIS: heparin   Injectable 5000 Unit(s) SubCutaneous every 8 hours    GI PROPHYLAXIS: pantoprazole  Injectable 40 milliGRAM(s) IV Push every 12 hours    ANTICOAGULATION:   ANTIBIOTICS:  caspofungin IVPB    caspofungin IVPB 50 milliGRAM(s)  meropenem  IVPB 1000 milliGRAM(s)  meropenem  IVPB        >>> <<<>>> <<<>>> <<<>>> <<<>>> <<<>>> <<<>>> <<<>>> <<<>>> <<<>>> <<<        LAB/STUDIES:  Labs:  CAPILLARY BLOOD GLUCOSE      POCT Blood Glucose.: 143 mg/dL (16 May 2025 18:48)  POCT Blood Glucose.: 139 mg/dL (16 May 2025 11:52)  POCT Blood Glucose.: 126 mg/dL (16 May 2025 05:08)                          7.7    13.13 )-----------( 240      ( 16 May 2025 19:10 )             23.9       Auto Immature Granulocyte %: 0.9 % (05-16-25 @ 19:10)    05-16    140  |  108  |  29[H]  ----------------------------<  134[H]  3.3[L]   |  20  |  1.1      Calcium: 10.2 mg/dL (05-16-25 @ 19:10)      LFTs:             4.0  | 0.3  | 11       ------------------[75      ( 16 May 2025 19:10 )  2.3  | 0.2  | 9           Lipase:x      Amylase:x         Blood Gas Arterial, Lactate: 1.0 mmol/L (05-16-25 @ 20:56)  Blood Gas Arterial, Lactate: 1.0 mmol/L (05-16-25 @ 14:48)  Blood Gas Venous - Lactate: 1.2 mmol/L (05-16-25 @ 06:00)  Blood Gas Venous - Lactate: 1.2 mmol/L (05-15-25 @ 18:02)  Blood Gas Arterial, Lactate: 1.1 mmol/L (05-15-25 @ 05:05)  Blood Gas Arterial, Lactate: 1.3 mmol/L (05-14-25 @ 21:52)  Blood Gas Arterial, Lactate: 1.3 mmol/L (05-14-25 @ 18:16)  Blood Gas Arterial, Lactate: 1.1 mmol/L (05-14-25 @ 12:37)  Blood Gas Arterial, Lactate: 1.2 mmol/L (05-14-25 @ 09:47)  Blood Gas Venous - Lactate: 1.5 mmol/L (05-14-25 @ 09:24)  Blood Gas Arterial, Lactate: 1.2 mmol/L (05-14-25 @ 06:58)  Blood Gas Arterial, Lactate: 1.0 mmol/L (05-14-25 @ 04:17)    ABG - ( 16 May 2025 20:56 )  pH: 7.45  /  pCO2: 28    /  pO2: 199   / HCO3: 20    / Base Excess: -3.9  /  SaO2: 100.0           ABG - ( 16 May 2025 14:48 )  pH: 7.42  /  pCO2: 32    /  pO2: 57    / HCO3: 21    / Base Excess: -3.0  /  SaO2: 90.0            ABG - ( 15 May 2025 05:05 )  pH: 7.42  /  pCO2: 29    /  pO2: 90    / HCO3: 19    / Base Excess: -4.9  /  SaO2: 98.4              Coags:     15.50  ----< 1.31    ( 15 May 2025 21:09 )     29.2                Urinalysis Basic - ( 16 May 2025 19:10 )    Color: x / Appearance: x / SG: x / pH: x  Gluc: 134 mg/dL / Ketone: x  / Bili: x / Urobili: x   Blood: x / Protein: x / Nitrite: x   Leuk Esterase: x / RBC: x / WBC x   Sq Epi: x / Non Sq Epi: x / Bacteria: x

## 2025-05-17 NOTE — PROGRESS NOTE ADULT - ASSESSMENT
74F found to have perforated peptic ulcer now s/p ex lap, distal antrectomy, abthera placement requiring vasopressors.    NEUROLOGICAL:  #sedation  - precedex infusion - currently HELD 2/2 bradycardia   #Acute pain    -IV APAP prn    -fentanyl 12.5mcg prn moderate pain   - fentanyl 25mcg prn severe pain    RESPIRATORY:   #Mechanical ventilation, s/p previous tracheostomy ()    - now with 8 portex cuffed trach (switched in OR on )    - Vent settings: 450/16/40/10    - Post op AB.45/28/199/20 > dec FiO2 to 40%    - continue duonebs   #Decreased small bibasilar opacities/atelectasis, right greater than left on CT  - : s/p deep suctioning by anesthesia w/ copious secretions   #h/o asthma  - episode of wheezing/desaturation  > started on atrovent and albuterol   #Activity    -bedrest given open abdomen     CARDS:   #Nonsustained Vtach - self resolved   - cards consult: management of septic shock. Start short acting AC for afib. Start beta blockers once off pressors   #hypotension 2/2 sepsis   - currently on levophed gtt and vasopressin @0.03  - MAP >65  #hx afib  - Currently in AFIB slow ventricular response (HR 40s-60s), remains on low dose pressors   - holding home Xarelto 15 QD (s/p PCC in OR)  #hx HTN  - holding home losartan 100QD  #elevated troponin  - 143 > 84   - no longer trending  #TTE : EF 71%. Mild-mod AS. Trivial pericardial effusion.   - Echo 5/15: EF 30-35%, multiple left ventricular motion wall abnormalities. Possible Takotsubo vs ischemic. Global left ventricular systolic function.     GASTROINTESTINAL/NUTRITION:   #perforated prepyloric gastric ulcer s/p exploratory laparotomy antrectomy and D1 stapled off with temporary abdominal closure and abthera in place  - : RTOR s/p bilroth 2, J tube placement, RLQ maliha drain by anastomosis extending to duodenal stump  #Diet, Strict NPO    - J tube to gravity     - NGT to LCWS    - aspiration precautions, HOB 30    - hx of GJ placement ()   #GI Prophylaxis    - protonix 40mg bid IV  #Bowel regimen    -holding    -last bowel movement prior to admission    /RENAL:   #urine output in critically ill    -chronic indwelling ya  #Maintain euvolemia    - LR @ 75 cc/hr  #metabolic acidosis    - off sodium bicarbonate infusion  #NOLA (baseline creatine 0.6)  - nephrology consulted > no RRT at this time  #oliguria  - intermittent albumin   #hx congential solitary kidney   #Hypomagnesemia - repleted  #Hypokalemia - repleted    Labs:          BUN/Cr- 35/1.4  -->,  29/1.1  -->          [ @ 19:10]Na  140 // K  3.3 // Mg  1.6 // Phos  3.6    HEME/ONC:   #DVT prophylaxis    -HSQ    -SCDs    Labs: Hb/Hct:  8.1/25.0  -->,  7.7/23.9  -->                      Plts:  297  -->,  240  -->                 PTT/INR:        ID:  WBC- 22.19  --->>,  16.67  --->>,  13.13  --->>  Temp trend- 24hrs T(F): 97.9 ( @ 20:00), Max: 97.9 ( @ 20:00)  #perforated duodenal ulcer  - zosyn (renal dosing) ( - )  - Meropenem ( - )  - caspofungin per primary team ( - )  - Nystatin powder for groin rash (5/15 - )    #MRSA pending   #COVID positive on admission    - remdesivir 200mg x1, 100mg x 2 day course  #Cultures    - : blood culture: NGTD    - : blood culture: NGTD    - : urine culture: E Coli  - ID following    ENDOCRINE:  #glycemic monitoring    -FSG q6 while NPO    -Glucose goal 140-180. If above 180, will start corrective insulin sliding scale  #hypercalcemia  - holding home cinacalcet 30mg bid while NPO    MSK:  #Activity - bedrest given open abdomen/abthera   #hx osteoporosis  - holding home Alendronate 70mg weekly on  while NPO    SKIN:  #DTI screening negative 5/15    - will continue to monitor daily skin changes      PALLIATIVE:  Currently full code, palliative consult       LINES/DRAINS:  PIVJennyfer (chronic), GJ in place (chronic), 8 cuffed portex trach, R radial arterial line (- ), LIJ CVC (- )  ADVANCED DIRECTIVES:  Full Code    HCP/Emergency Contact- daughter Cecilia [phone]    INDICATION FOR SICU/SDU:  perforated peptic ulcer; mechanical ventilation/vasopressors     DISPO:  SICU. Case to be discussed with attending Dr. Pena 74F found to have perforated peptic ulcer now s/p ex lap, distal antrectomy, abthera placement requiring vasopressors. RTOR 5/16 s/p second look laparotomy, Billroth II reconstruction, gastrojejunostomy tube replaced with a jejunal feeding tube, abdomen closed at the end of the case.     NEUROLOGICAL:  #sedation  - precedex infusion - currently HELD 2/2 bradycardia   #Acute pain    -IV APAP prn    -fentanyl 12.5mcg prn moderate pain   - fentanyl 25mcg prn severe pain    RESPIRATORY:   #Mechanical ventilation, s/p previous tracheostomy (11/24)    - now with 8 portex cuffed trach (switched in OR on 5/14)    - Vent settings: 450/16/40/10 --> ABG - ( 17 May 2025 03:59 ) pH, Arterial: 7.49  pH, Blood: x     /  pCO2: 24    /  pO2: 188   / HCO3: 18    / Base Excess: -4.3  /  SaO2: 99.5      - Vent setting now 450/14/40/10    - continue duonebs   #Decreased small bibasilar opacities/atelectasis, right greater than left on CT  - 5/16: s/p deep suctioning by anesthesia w/ copious secretions   #h/o asthma  - started on atrovent and albuterol   #Activity   - increase as tolerated     CARDS:   #Nonsustained Vtach - self resolved   - cards consult: management of septic shock. Start short acting AC for afib. Start beta blockers once off pressors   #hypotension 2/2 sepsis   - currently on levophed gtt and vasopressin @0.03  - MAP >65  #hx afib  - Currently in AFIB slow ventricular response (HR 40s-60s), remains on low dose pressors   - holding home Xarelto 15 QD  #hx HTN  - holding home losartan 100QD  #elevated troponin  - 143 > 84   - no longer trending  #TTE 11/24: EF 71%. Mild-mod AS. Trivial pericardial effusion.   - Echo 5/15: EF 30-35%, multiple left ventricular motion wall abnormalities. Possible Takotsubo vs ischemic. Global left ventricular systolic function.     GASTROINTESTINAL/NUTRITION:   #perforated prepyloric gastric ulcer s/p exploratory laparotomy antrectomy and D1 stapled off with temporary abdominal closure and abthera in place  - 5/16: RTOR s/p bilroth 2, J tube placement, RLQ maliha drain by anastomosis extending to duodenal stump  #Diet, NPO except medications    - J tube to gravity     - NGT to LCWS    - aspiration precautions, HOB 30    - hx of GJ placement (11/24)   #GI Prophylaxis    - protonix 40mg bid IV  #Bowel regimen    -holding    -last bowel movement prior to admission    /RENAL:   #urine output in critically ill    -chronic indwelling ya  #Maintain euvolemia    - LR @ 75 cc/hr  #metabolic acidosis    - off sodium bicarbonate infusion  #NOLA (baseline creatine 0.6), improving   - nephrology consulted > no RRT at this time  #oliguria  - intermittent albumin   #hx congential solitary kidney     Labs:          BUN/Cr- 35/1.4  -->,  29/1.1  --> 30/1.1          [05-16 @ 19:10]Na  140 // K  3.3 // Mg  1.6 // Phos  3.6  #Hypomagnesemia - repleted with 4g Mg  #Hypokalemia - repleted with 40mEq KCl     HEME/ONC:   #DVT prophylaxis    -HSQ    -SCDs    Labs: Hb/Hct:  8.1/25.0  -->,  7.7/23.9  -->                      Plts:  297  -->,  240  -->                 PTT/INR:        ID:  WBC- 22.19  --->>,  16.67  --->>,  13.13  --->>  Temp trend- 24hrs T(F): 97.9 (05-16 @ 20:00), Max: 97.9 (05-16 @ 20:00)  #perforated duodenal ulcer  - zosyn (renal dosing) (5/14 - 5/16)  - Meropenem (5/16 - )  - caspofungin per primary team (5/14 - )  - Nystatin powder for groin rash (5/15 - )  #COVID positive on admission    - remdesivir 200mg x1, 100mg x 2 day course  #Cultures    - 5/13: blood culture: NGTD    - 5/13: blood culture: NGTD    - 5/13: urine culture: E Coli  - ID following    ENDOCRINE:  #glycemic monitoring    -FSG q6 while NPO    -Glucose goal 140-180. If above 180, will start corrective insulin sliding scale  #hypercalcemia  - holding home cinacalcet 30mg bid    MSK:  #Activity - increase as tolerated   #hx osteoporosis  - holding home Alendronate 70mg weekly on Thursdays while NPO    SKIN:  #DTI screening negative 5/17    - will continue to monitor daily skin changes      PALLIATIVE:  Currently full code, palliative consult       LINES/DRAINS:  PIV, Ya (chronic), J tube, 8 cuffed portex trach, R radial arterial line (5/14- ), LIJ CVC (5/14- )  ADVANCED DIRECTIVES:  Full Code    HCP/Emergency Contact- daughter Cecilia [phone]  INDICATION FOR SICU/SDU:  perforated peptic ulcer; mechanical ventilation/vasopressors   DISPO:  SICU. Case to be discussed with attending Dr. Pena 74F found to have perforated peptic ulcer now s/p ex lap, distal antrectomy, abthera placement requiring vasopressors. RTOR 5/16 s/p second look laparotomy, Billroth II reconstruction, gastrojejunostomy tube replaced with a jejunal feeding tube, abdomen closed at the end of the case.     NEUROLOGICAL:  #Acute pain    -IV APAP prn    - oxycodone 2.5mg prn moderate pain   - oxycodone 5mg prn severe pain    RESPIRATORY:   #Mechanical ventilation, s/p previous tracheostomy (11/24)    - now with 8 portex cuffed trach (switched in OR on 5/14)    - Vent setting now 450/14/40/10 > ABG - ( 17 May 2025 08:44 )pH, Arterial: 7.44  pH, Blood: x     /  pCO2: 31    /  pO2: 158   / HCO3: 21    / Base Excess: -2.6  /  SaO2: 99.1      - trial of CPAP and trach collar today     - continue duonebs   #Decreased small bibasilar opacities/atelectasis, right greater than left on CT  - 5/16: s/p deep suctioning by anesthesia w/ copious secretions   #h/o asthma  - atrovent and albuterol   #Activity   - increase as tolerated     CARDS:   #Nonsustained Vtach - self resolved   - cards consult: management of septic shock. Start short acting AC for afib. Start beta blockers once off pressors   #hypotension 2/2 sepsis   - currently on levophed gtt and vasopressin @0.03  - MAP >65  #hx afib  - Currently in AFIB slow ventricular response (HR 40s-60s), remains on low dose pressors   - holding home Xarelto 15 QD  #hx HTN  - holding home losartan 100QD  #elevated troponin  - 143 > 84   - no longer trending  #TTE 11/24: EF 71%. Mild-mod AS. Trivial pericardial effusion.   - Echo 5/15: EF 30-35%, multiple left ventricular motion wall abnormalities. Possible Takotsubo vs ischemic. Global left ventricular systolic function.     GASTROINTESTINAL/NUTRITION:   #perforated prepyloric gastric ulcer s/p exploratory laparotomy antrectomy and D1 stapled off with temporary abdominal closure and abthera in place  - 5/16: RTOR s/p bilroth 2, J tube placement, RLQ maliha drain by anastomosis extending to duodenal stump  #Diet, NPO except medications    - J tube > ok to start trickle feeds today     - medications through NGT    - NGT to LCWS    - aspiration precautions, HOB 30  #GI Prophylaxis    - protonix 40mg bid IV  #Bowel regimen    -holding    -last bowel movement prior to admission    /RENAL:   #urine output in critically ill    -chronic indwelling ya > exchange to new ya today  #Maintain euvolemia   - IVL  #metabolic acidosis, resolved  #NOLA (baseline creatine 0.6), improving   - nephrology consulted > no RRT at this time  #oliguria  - intermittent albumin   #hx congential solitary kidney     Labs:          BUN/Cr- 35/1.4  -->,  29/1.1  --> 30/1.1          [05-16 @ 19:10]Na  140 // K  3.3 // Mg  1.6 // Phos  3.6  #Hypomagnesemia - repleted with 4g Mg  #Hypokalemia - repleted with 40mEq KCl     HEME/ONC:   #DVT prophylaxis    -HSQ    -SCDs  #anemia  - 1pRBC 5/17 to help wean off vasopressors    Labs: Hb/Hct:  8.1/25.0  -->,  7.7/23.9  -->                      Plts:  297  -->,  240  -->                 PTT/INR:        ID:  WBC- 22.19  --->>,  16.67  --->>,  13.13  --->>  Temp trend- 24hrs T(F): 97.9 (05-16 @ 20:00), Max: 97.9 (05-16 @ 20:00)  #perforated duodenal ulcer  - zosyn (renal dosing) (5/14 - 5/16)  - Meropenem (5/16 - )  - caspofungin per primary team (5/14 - )  - Nystatin powder for groin rash (5/15 - )  #COVID positive on admission    - remdesivir 200mg x1, 100mg x 2 day course  #Cultures    - 5/13: blood culture: NGTD    - 5/13: blood culture: NGTD    - 5/13: urine culture: E Coli  - ID following    ENDOCRINE:  #glycemic monitoring    -FSG q6 while NPO    -Glucose goal 140-180. If above 180, will start corrective insulin sliding scale  #hypercalcemia  - holding home cinacalcet 30mg bid    MSK:  #Activity - increase as tolerated   #hx osteoporosis  - holding home Alendronate 70mg weekly on Thursdays while NPO    SKIN:  #DTI screening negative 5/17    - will continue to monitor daily skin changes      PALLIATIVE:  Currently full code, palliative consult       LINES/DRAINS:  PIV, Ay (chronic), J tube, 8 cuffed portex trach, R radial arterial line (5/14- ), LIJ CVC (5/14- )  ADVANCED DIRECTIVES:  Full Code    HCP/Emergency Contact- daughter Cecilia [phone]  INDICATION FOR SICU/SDU:  perforated peptic ulcer; mechanical ventilation/vasopressors   DISPO:  SICU. Case to be discussed with attending Dr. Pena

## 2025-05-17 NOTE — PROGRESS NOTE ADULT - ASSESSMENT
Patient is a 74y old Female s/p take back and second look laparotomy with billroth 2 and replacement of GJ    Plan:  - Monitor BULMARO drain   - wean off pressors as tolerated  - Monitor post op labs  - Monitor GJ site for any bleeding.

## 2025-05-17 NOTE — PROGRESS NOTE ADULT - SUBJECTIVE AND OBJECTIVE BOX
PATRICIA SPRAGUE   [id number]   [birth year]    74yF  ============================================================   DATE OF INITIAL SICU/SDU CONSULT: 25    INDICATION FOR SICU CONSULT:  perforated duodenal ulcer      SICU COURSE EVENTS :   - admitted to SICU service   - s/p OR for exploratory laparotomy, started remdesivir for covid +, echo ordered  5/15 - Echo showing EF 30-35%. Pending RTOR. Remains on vasopressors, weaning. Pending RTOR.  : S/p Exploratory laparotomy, bilroth 2, J tube placement. Episode of wheezing. Changed to meropenem per ID. AFIB slow ventricular response HR 40s-60s, remains on low dose levophed/pressors      24HOUR EVENTS    NIGHT   -PM rounds: following commands, precede @ 0.4, SBP 120s, HR 60s, levo @ 0.06, vaso @ 0.04, BULMARO serosanguinous, NGT gastric/bilious output, J tube clamped   -4g magnesium, 40mEq K repleted  -KUB performed, f/u read  -Post op AB.45/28/199/20 lactate 1.0   -Precedex dc'd 2/2 bradycardia - AFIB slow ventricular response HR 40s-60s, remains on low dose levophed  -AM BMP   -AM ABG/CXR    DAY  - OR today  - Increased FIO2 to 40% > 60% prior to OR   - 2u prbc on hold  - Changed to meropenem per ID for ESBL in urine   - wheezing, given duonebs, ABG 7.42/32/57/21 on 40/10, increase pO2 to 60/10, decreased IVF to 75cc/hr   -s/p bilroth 2, J tube placement, RLQ maliha drain by anastomosis extending to duodenal stump. Deep suctioning performed by anesthesia with copious secretions  -1 hour 30 mins, 800 IVF, , EBL 30  - No feeds tonight      [X] A ten-point review of systems was negative except as expressed in note.  [X] History was obtained from patient. If unable to participate in their care, history obtained from review of the chart and collateral sources of information.  ============================================================      PATRICIA SPRAGUE   [id number]   [birth year]    74yF  ============================================================   DATE OF INITIAL SICU/SDU CONSULT: 25    INDICATION FOR SICU CONSULT:  perforated duodenal ulcer      SICU COURSE EVENTS :   - admitted to SICU service   - s/p OR for exploratory laparotomy, started remdesivir for covid +, echo ordered  5/15 - Echo showing EF 30-35%, cardiology consulted. Pending RTOR. Remains on vasopressors, weaning.  : S/p Exploratory laparotomy, bilroth 2, J tube placement. Episode of wheezing. Changed to meropenem per ID. AFIB slow ventricular response HR 40s-60s, remains on low dose pressors      24HOUR EVENTS  NIGHT   -PM rounds: following commands, precede @ 0.4, SBP 120s, HR 60s, levo @ 0.06, vaso @ 0.04, BULMARO serosanguinous, NGT gastric/bilious output, J tube clamped   -4g magnesium, 40mEq K repleted  -KUB performed, f/u read  -Post op AB.45/28/199/20 lactate 1.0 > decreased FiO2 to 40%  -Precedex dc'd 2/2 bradycardia - AFIB slow ventricular response HR 40s-60s, remains on low dose pressors   -AM BMP   -AM ABG/CXR    DAY  - OR today  - Increased FIO2 to 40% > 60% prior to OR   - 2u prbc on hold  - Changed to meropenem per ID for ESBL in urine   - wheezing, given duonebs, ABG 7.42/32/57/21 on 40/10, increase pO2 to 60/10, decreased IVF to 75cc/hr   -s/p bilroth 2, J tube placement, RLQ maliha drain by anastomosis extending to duodenal stump. Deep suctioning performed by anesthesia with copious secretions  -1 hour 30 mins, 800 IVF, , EBL 30  - No feeds tonight        [X] A ten-point review of systems was negative except as expressed in note.  [X] History was obtained from patient. If unable to participate in their care, history obtained from review of the chart and collateral sources of information.  ============================================================   Daily Height in cm: 167.6 (16 May 2025 15:13)    Daily   Diet, NPO:   Except Medications (25 @ 19:01)    CURRENT MEDS:  Neurologic Medications  acetaminophen   IVPB .. 1000 milliGRAM(s) IV Intermittent once  dexMEDEtomidine Infusion 0.2 MICROgram(s)/kG/Hr IV Continuous <Continuous>  fentaNYL    Injectable 12.5 MICROGram(s) IV Push every 4 hours PRN Moderate Pain (4 - 6)  fentaNYL    Injectable 25 MICROGram(s) IV Push every 4 hours PRN Severe Pain (7 - 10)    Respiratory Medications  albuterol    90 MICROgram(s) HFA Inhaler 2 Puff(s) Inhalation every 6 hours  ipratropium 17 MICROgram(s) HFA Inhaler 1 Puff(s) Inhalation every 6 hours    Cardiovascular Medications  norepinephrine Infusion 0.05 MICROgram(s)/kG/Min IV Continuous <Continuous>    Gastrointestinal Medications  lactated ringers. 1000 milliLiter(s) IV Continuous <Continuous>  pantoprazole  Injectable 40 milliGRAM(s) IV Push every 12 hours  sodium chloride 0.9% lock flush 10 milliLiter(s) IV Push every 1 hour PRN Pre/post blood products, medications, blood draw, and to maintain line patency    Genitourinary Medications    Hematologic/Oncologic Medications  heparin   Injectable 5000 Unit(s) SubCutaneous every 8 hours    Antimicrobial/Immunologic Medications  caspofungin IVPB 50 milliGRAM(s) IV Intermittent every 24 hours  caspofungin IVPB      meropenem  IVPB      meropenem  IVPB 1000 milliGRAM(s) IV Intermittent every 12 hours    Endocrine/Metabolic Medications  vasopressin Infusion. 0.03 Unit(s)/Min IV Continuous <Continuous>    Topical/Other Medications  chlorhexidine 0.12% Liquid 15 milliLiter(s) Oral Mucosa every 12 hours  chlorhexidine 2% Cloths 1 Application(s) Topical <User Schedule>  nystatin Powder 1 Application(s) Topical two times a day    ICU Vital Signs Last 24 Hrs  T(C): 36.6 (17 May 2025 04:00), Max: 36.7 (17 May 2025 00:00)  T(F): 97.8 (17 May 2025 04:00), Max: 98 (17 May 2025 00:00)  HR: 94 (17 May 2025 06:00) (45 - 94)  BP: 101/57 (16 May 2025 15:13) (99/55 - 121/60)  BP(mean): 72 (16 May 2025 15:13) (70 - 86)  ABP: 105/52 (17 May 2025 06:00) (47/31 - 155/73)  ABP(mean): 72 (17 May 2025 06:00) (36 - 99)  RR: 16 (17 May 2025 06:00) (14 - 22)  SpO2: 100% (17 May 2025 06:00) (89% - 100%)    O2 Parameters below as of 16 May 2025 20:00  Patient On (Oxygen Delivery Method): ventilator    O2 Concentration (%): 60      Mode: AC/ CMV (Assist Control/ Continuous Mandatory Ventilation)  RR (machine): 14  TV (machine): 450  FiO2: 40  PEEP: 10  ITime: 0.8  MAP: 18  PIP: 22    ABG - ( 17 May 2025 03:59 )  pH, Arterial: 7.49  pH, Blood: x     /  pCO2: 24    /  pO2: 188   / HCO3: 18    / Base Excess: -4.3  /  SaO2: 99.5              I&O's Summary    16 May 2025 07:  -  17 May 2025 07:00  --------------------------------------------------------  IN: 2865 mL / OUT: 720 mL / NET: 2145 mL      I&O's Detail    16 May 2025 07:01  -  17 May 2025 07:00  --------------------------------------------------------  IN:    Dexmedetomidine: 91 mL    IV PiggyBack: 250 mL    IV PiggyBack: 260 mL    IV PiggyBack: 50 mL    Lactated Ringers: 1965 mL    Norepinephrine: 150 mL    Vasopressin (Organ Donation): 99 mL  Total IN: 2865 mL    OUT:    Bulb (mL): 40 mL    Nasogastric/Oral tube (mL): 150 mL    Ureteral Catheter (mL): 530 mL  Total OUT: 720 mL    Total NET: 2145 mL          PHYSICAL EXAM:     NEURO/GENERAL: NAD. GCS 11T. Follows commands.    RESP: equal chest rise b/l, no signs of respiratory distress; tracheostomy in place on mechanical ventilation.   CARDIAC: S1,S2. Afib on monitor, Nontachycardic   GI: abdomen soft, nondistended, nontender. midline incision with dressing in place, c/d/i. J tube in place. BULMARO X1 SS output.   : urinary catheter in place   EXTREMITIES: moving extremities; Pitting edema peripheral edema   SKIN: no DTI noted

## 2025-05-18 LAB
ANION GAP SERPL CALC-SCNC: 11 MMOL/L — SIGNIFICANT CHANGE UP (ref 7–14)
ANION GAP SERPL CALC-SCNC: 11 MMOL/L — SIGNIFICANT CHANGE UP (ref 7–14)
BASOPHILS # BLD AUTO: 0.01 K/UL — SIGNIFICANT CHANGE UP (ref 0–0.2)
BASOPHILS NFR BLD AUTO: 0.1 % — SIGNIFICANT CHANGE UP (ref 0–1)
BUN SERPL-MCNC: 30 MG/DL — HIGH (ref 10–20)
BUN SERPL-MCNC: 31 MG/DL — HIGH (ref 10–20)
CALCIUM SERPL-MCNC: 10.8 MG/DL — HIGH (ref 8.4–10.5)
CALCIUM SERPL-MCNC: 11 MG/DL — HIGH (ref 8.4–10.5)
CHLORIDE SERPL-SCNC: 108 MMOL/L — SIGNIFICANT CHANGE UP (ref 98–110)
CHLORIDE SERPL-SCNC: 109 MMOL/L — SIGNIFICANT CHANGE UP (ref 98–110)
CO2 SERPL-SCNC: 21 MMOL/L — SIGNIFICANT CHANGE UP (ref 17–32)
CO2 SERPL-SCNC: 21 MMOL/L — SIGNIFICANT CHANGE UP (ref 17–32)
CREAT SERPL-MCNC: 1 MG/DL — SIGNIFICANT CHANGE UP (ref 0.7–1.5)
CREAT SERPL-MCNC: 1 MG/DL — SIGNIFICANT CHANGE UP (ref 0.7–1.5)
CULTURE RESULTS: SIGNIFICANT CHANGE UP
CULTURE RESULTS: SIGNIFICANT CHANGE UP
EGFR: 60 ML/MIN/1.73M2 — SIGNIFICANT CHANGE UP
EOSINOPHIL # BLD AUTO: 0.08 K/UL — SIGNIFICANT CHANGE UP (ref 0–0.7)
EOSINOPHIL NFR BLD AUTO: 0.6 % — SIGNIFICANT CHANGE UP (ref 0–8)
GAS PNL BLDA: SIGNIFICANT CHANGE UP
GLUCOSE SERPL-MCNC: 83 MG/DL — SIGNIFICANT CHANGE UP (ref 70–99)
GLUCOSE SERPL-MCNC: 91 MG/DL — SIGNIFICANT CHANGE UP (ref 70–99)
HCT VFR BLD CALC: 24.1 % — LOW (ref 37–47)
HCT VFR BLD CALC: 25.5 % — LOW (ref 37–47)
HGB BLD-MCNC: 7.8 G/DL — LOW (ref 12–16)
HGB BLD-MCNC: 8.3 G/DL — LOW (ref 12–16)
IMM GRANULOCYTES NFR BLD AUTO: 0.6 % — HIGH (ref 0.1–0.3)
LYMPHOCYTES # BLD AUTO: 1.27 K/UL — SIGNIFICANT CHANGE UP (ref 1.2–3.4)
LYMPHOCYTES # BLD AUTO: 10.1 % — LOW (ref 20.5–51.1)
MAGNESIUM SERPL-MCNC: 2 MG/DL — SIGNIFICANT CHANGE UP (ref 1.8–2.4)
MAGNESIUM SERPL-MCNC: 2.1 MG/DL — SIGNIFICANT CHANGE UP (ref 1.8–2.4)
MCHC RBC-ENTMCNC: 27.8 PG — SIGNIFICANT CHANGE UP (ref 27–31)
MCHC RBC-ENTMCNC: 28.1 PG — SIGNIFICANT CHANGE UP (ref 27–31)
MCHC RBC-ENTMCNC: 32.4 G/DL — SIGNIFICANT CHANGE UP (ref 32–37)
MCHC RBC-ENTMCNC: 32.5 G/DL — SIGNIFICANT CHANGE UP (ref 32–37)
MCV RBC AUTO: 85.3 FL — SIGNIFICANT CHANGE UP (ref 81–99)
MCV RBC AUTO: 86.7 FL — SIGNIFICANT CHANGE UP (ref 81–99)
MONOCYTES # BLD AUTO: 0.69 K/UL — HIGH (ref 0.1–0.6)
MONOCYTES NFR BLD AUTO: 5.5 % — SIGNIFICANT CHANGE UP (ref 1.7–9.3)
NEUTROPHILS # BLD AUTO: 10.46 K/UL — HIGH (ref 1.4–6.5)
NEUTROPHILS NFR BLD AUTO: 83.1 % — HIGH (ref 42.2–75.2)
NRBC BLD AUTO-RTO: 0 /100 WBCS — SIGNIFICANT CHANGE UP (ref 0–0)
NRBC BLD AUTO-RTO: 0 /100 WBCS — SIGNIFICANT CHANGE UP (ref 0–0)
PHOSPHATE SERPL-MCNC: 2.8 MG/DL — SIGNIFICANT CHANGE UP (ref 2.1–4.9)
PHOSPHATE SERPL-MCNC: 3.5 MG/DL — SIGNIFICANT CHANGE UP (ref 2.1–4.9)
PLATELET # BLD AUTO: 168 K/UL — SIGNIFICANT CHANGE UP (ref 130–400)
PLATELET # BLD AUTO: 170 K/UL — SIGNIFICANT CHANGE UP (ref 130–400)
PMV BLD: 10.5 FL — HIGH (ref 7.4–10.4)
PMV BLD: 10.6 FL — HIGH (ref 7.4–10.4)
POTASSIUM SERPL-MCNC: 3.2 MMOL/L — LOW (ref 3.5–5)
POTASSIUM SERPL-MCNC: 4.1 MMOL/L — SIGNIFICANT CHANGE UP (ref 3.5–5)
POTASSIUM SERPL-SCNC: 3.2 MMOL/L — LOW (ref 3.5–5)
POTASSIUM SERPL-SCNC: 4.1 MMOL/L — SIGNIFICANT CHANGE UP (ref 3.5–5)
RBC # BLD: 2.78 M/UL — LOW (ref 4.2–5.4)
RBC # BLD: 2.99 M/UL — LOW (ref 4.2–5.4)
RBC # FLD: 16.1 % — HIGH (ref 11.5–14.5)
RBC # FLD: 16.7 % — HIGH (ref 11.5–14.5)
SODIUM SERPL-SCNC: 140 MMOL/L — SIGNIFICANT CHANGE UP (ref 135–146)
SODIUM SERPL-SCNC: 141 MMOL/L — SIGNIFICANT CHANGE UP (ref 135–146)
SPECIMEN SOURCE: SIGNIFICANT CHANGE UP
SPECIMEN SOURCE: SIGNIFICANT CHANGE UP
WBC # BLD: 12.14 K/UL — HIGH (ref 4.8–10.8)
WBC # BLD: 12.59 K/UL — HIGH (ref 4.8–10.8)
WBC # FLD AUTO: 12.14 K/UL — HIGH (ref 4.8–10.8)
WBC # FLD AUTO: 12.59 K/UL — HIGH (ref 4.8–10.8)

## 2025-05-18 PROCEDURE — 74018 RADEX ABDOMEN 1 VIEW: CPT | Mod: 26

## 2025-05-18 PROCEDURE — 71045 X-RAY EXAM CHEST 1 VIEW: CPT | Mod: 26

## 2025-05-18 PROCEDURE — 99291 CRITICAL CARE FIRST HOUR: CPT | Mod: 25

## 2025-05-18 RX ORDER — CINACALCET 30 MG/1
30 TABLET, FILM COATED ORAL EVERY 12 HOURS
Refills: 0 | Status: DISCONTINUED | OUTPATIENT
Start: 2025-05-18 | End: 2025-05-18

## 2025-05-18 RX ORDER — POTASSIUM PHOSPHATE, MONOBASIC POTASSIUM PHOSPHATE, DIBASIC INJECTION, 236; 224 MG/ML; MG/ML
15 SOLUTION, CONCENTRATE INTRAVENOUS ONCE
Refills: 0 | Status: COMPLETED | OUTPATIENT
Start: 2025-05-18 | End: 2025-05-18

## 2025-05-18 RX ORDER — SODIUM CHLORIDE 9 G/1000ML
1000 INJECTION, SOLUTION INTRAVENOUS
Refills: 0 | Status: DISCONTINUED | OUTPATIENT
Start: 2025-05-18 | End: 2025-05-20

## 2025-05-18 RX ORDER — MIDODRINE HYDROCHLORIDE 5 MG/1
10 TABLET ORAL EVERY 8 HOURS
Refills: 0 | Status: DISCONTINUED | OUTPATIENT
Start: 2025-05-18 | End: 2025-05-18

## 2025-05-18 RX ADMIN — OXYCODONE HYDROCHLORIDE 5 MILLIGRAM(S): 30 TABLET ORAL at 08:28

## 2025-05-18 RX ADMIN — Medication 1 PUFF(S): at 08:37

## 2025-05-18 RX ADMIN — Medication 40 MILLIGRAM(S): at 17:27

## 2025-05-18 RX ADMIN — Medication 2 PUFF(S): at 08:38

## 2025-05-18 RX ADMIN — HEPARIN SODIUM 5000 UNIT(S): 1000 INJECTION INTRAVENOUS; SUBCUTANEOUS at 13:35

## 2025-05-18 RX ADMIN — Medication 15 MILLILITER(S): at 17:26

## 2025-05-18 RX ADMIN — Medication 15 MILLILITER(S): at 05:34

## 2025-05-18 RX ADMIN — HEPARIN SODIUM 5000 UNIT(S): 1000 INJECTION INTRAVENOUS; SUBCUTANEOUS at 05:34

## 2025-05-18 RX ADMIN — CINACALCET 30 MILLIGRAM(S): 30 TABLET, FILM COATED ORAL at 04:37

## 2025-05-18 RX ADMIN — NYSTATIN 1 APPLICATION(S): 100000 CREAM TOPICAL at 17:26

## 2025-05-18 RX ADMIN — MIDODRINE HYDROCHLORIDE 10 MILLIGRAM(S): 5 TABLET ORAL at 10:40

## 2025-05-18 RX ADMIN — MEROPENEM 100 MILLIGRAM(S): 1 INJECTION INTRAVENOUS at 05:35

## 2025-05-18 RX ADMIN — MEROPENEM 100 MILLIGRAM(S): 1 INJECTION INTRAVENOUS at 17:27

## 2025-05-18 RX ADMIN — Medication 100 MILLIEQUIVALENT(S): at 01:09

## 2025-05-18 RX ADMIN — Medication 1 APPLICATION(S): at 05:45

## 2025-05-18 RX ADMIN — NYSTATIN 1 APPLICATION(S): 100000 CREAM TOPICAL at 05:35

## 2025-05-18 RX ADMIN — Medication 1 PUFF(S): at 13:06

## 2025-05-18 RX ADMIN — MIDODRINE HYDROCHLORIDE 10 MILLIGRAM(S): 5 TABLET ORAL at 13:35

## 2025-05-18 RX ADMIN — Medication 100 MILLIEQUIVALENT(S): at 03:01

## 2025-05-18 RX ADMIN — Medication 2 PUFF(S): at 01:54

## 2025-05-18 RX ADMIN — Medication 2 PUFF(S): at 20:00

## 2025-05-18 RX ADMIN — Medication 40 MILLIGRAM(S): at 05:34

## 2025-05-18 RX ADMIN — CASPOFUNGIN ACETATE 260 MILLIGRAM(S): 5 INJECTION, POWDER, LYOPHILIZED, FOR SOLUTION INTRAVENOUS at 23:06

## 2025-05-18 RX ADMIN — HEPARIN SODIUM 5000 UNIT(S): 1000 INJECTION INTRAVENOUS; SUBCUTANEOUS at 23:05

## 2025-05-18 RX ADMIN — Medication 650 MILLIGRAM(S): at 05:34

## 2025-05-18 RX ADMIN — OXYCODONE HYDROCHLORIDE 5 MILLIGRAM(S): 30 TABLET ORAL at 08:58

## 2025-05-18 RX ADMIN — Medication 650 MILLIGRAM(S): at 11:34

## 2025-05-18 RX ADMIN — OXYCODONE HYDROCHLORIDE 2.5 MILLIGRAM(S): 30 TABLET ORAL at 03:55

## 2025-05-18 RX ADMIN — Medication 1 PUFF(S): at 20:00

## 2025-05-18 RX ADMIN — OXYCODONE HYDROCHLORIDE 5 MILLIGRAM(S): 30 TABLET ORAL at 20:52

## 2025-05-18 RX ADMIN — Medication 2 PUFF(S): at 13:06

## 2025-05-18 RX ADMIN — Medication 650 MILLIGRAM(S): at 17:27

## 2025-05-18 RX ADMIN — Medication 1 PUFF(S): at 01:54

## 2025-05-18 RX ADMIN — OXYCODONE HYDROCHLORIDE 5 MILLIGRAM(S): 30 TABLET ORAL at 21:30

## 2025-05-18 RX ADMIN — POTASSIUM PHOSPHATE, MONOBASIC POTASSIUM PHOSPHATE, DIBASIC INJECTION, 63.75 MILLIMOLE(S): 236; 224 SOLUTION, CONCENTRATE INTRAVENOUS at 01:09

## 2025-05-18 NOTE — PROGRESS NOTE ADULT - SUBJECTIVE AND OBJECTIVE BOX
SURGERY PROGRESS NOTE    Patient: PATRICIA SPRAGUE , 74y ([birth year])Female   MRN: [id number]  Location: [place] [address] A  Visit: 05-13-25 Inpatient  Date: 05-18-25 @ 01:19    LOS: 4d    Procedure/Dx/Injuries: Perforated peptic ulcer  Perforated duodenal ulcer  Perforated peptic ulcer  Septic shock  Acute respiratory failure with hypoxia  Encounter for palliative care  Partial antrectomy  US Doppler guided insertion of central venous catheter  Laparotomy, second look, with packing removal  Billroth II operation  Replacement of gastrojejunostomy tube    Events of past 24 hours: Over night on a tpiece @40%. Remains on vaso 0.03, off levo now. Receiving TF via J @10cc/hr. +g and BM. BULMARO ss.     PHYSICAL EXAM:  General: NAD AAOx3   Resp: symmetric and equal chest rise and fall on trach   Abdomen: soft, nondistended, J in place c/d/i. BULMARO ss      PAST MEDICAL & SURGICAL HISTORY:  HTN (hypertension)      Diabetes mellitus      Obesity      Gastroesophageal reflux disease      Active asthma      Generalized OA      Afib      H/O hernia repair  2011 and revised in 2013      History of cholecystectomy      Status post debridement      H/O tracheostomy      S/P gastrostomy      S/P jejunostomy          Vitals:   T(F): 98.8 (05-18-25 @ 00:00), Max: 98.8 (05-18-25 @ 00:00)  HR: 96 (05-18-25 @ 01:00)  BP: 122/75 (05-18-25 @ 01:00)  RR: 29 (05-18-25 @ 01:00)  SpO2: 100% (05-18-25 @ 01:00)  Mode: standby    Diet, NPO with Tube Feed:   Tube Feeding Modality: Jejunostomy  Glucerna 1.2 Jeff (GLUCERNARTH)  Total Volume for 24 Hours (mL): 240  Continuous  Starting Tube Feed Rate mL per Hour: 10  Until Goal Tube Feed Rate (mL per Hour): 10  Tube Feed Duration (in Hours): 24  Tube Feed Start Time: 10:00      Fluids:     I & O's:    05-16-25 @ 07:01  -  05-17-25 @ 07:00  --------------------------------------------------------  IN:    Dexmedetomidine: 91 mL    IV PiggyBack: 250 mL    IV PiggyBack: 260 mL    IV PiggyBack: 50 mL    Lactated Ringers: 1965 mL    Norepinephrine: 150 mL    Vasopressin (Organ Donation): 99 mL  Total IN: 2865 mL    OUT:    Bulb (mL): 40 mL    Nasogastric/Oral tube (mL): 150 mL    Ureteral Catheter (mL): 530 mL  Total OUT: 720 mL    Total NET: 2145 mL          MEDICATIONS  (STANDING):  acetaminophen     Tablet .. 650 milliGRAM(s) Oral every 6 hours  albuterol    90 MICROgram(s) HFA Inhaler 2 Puff(s) Inhalation every 6 hours  caspofungin IVPB 50 milliGRAM(s) IV Intermittent every 24 hours  caspofungin IVPB      chlorhexidine 0.12% Liquid 15 milliLiter(s) Oral Mucosa every 12 hours  chlorhexidine 2% Cloths 1 Application(s) Topical <User Schedule>  heparin   Injectable 5000 Unit(s) SubCutaneous every 8 hours  ipratropium 17 MICROgram(s) HFA Inhaler 1 Puff(s) Inhalation every 6 hours  meropenem  IVPB      meropenem  IVPB 1000 milliGRAM(s) IV Intermittent every 12 hours  nystatin Powder 1 Application(s) Topical two times a day  pantoprazole  Injectable 40 milliGRAM(s) IV Push every 12 hours  potassium chloride  20 mEq/100 mL IVPB 20 milliEquivalent(s) IV Intermittent every 1 hour  vasopressin Infusion. 0.03 Unit(s)/Min (4.5 mL/Hr) IV Continuous <Continuous>    MEDICATIONS  (PRN):  oxyCODONE    IR 2.5 milliGRAM(s) Oral every 6 hours PRN Moderate Pain (4 - 6)  oxyCODONE    IR 5 milliGRAM(s) Oral every 4 hours PRN Severe Pain (7 - 10)  sodium chloride 0.9% lock flush 10 milliLiter(s) IV Push every 1 hour PRN Pre/post blood products, medications, blood draw, and to maintain line patency      DVT PROPHYLAXIS: heparin   Injectable 5000 Unit(s) SubCutaneous every 8 hours    GI PROPHYLAXIS: pantoprazole  Injectable 40 milliGRAM(s) IV Push every 12 hours    ANTICOAGULATION:   ANTIBIOTICS:  caspofungin IVPB 50 milliGRAM(s)  caspofungin IVPB    meropenem  IVPB    meropenem  IVPB 1000 milliGRAM(s)            LAB/STUDIES:  Labs:  CAPILLARY BLOOD GLUCOSE      POCT Blood Glucose.: 96 mg/dL (17 May 2025 23:48)  POCT Blood Glucose.: 96 mg/dL (17 May 2025 18:12)  POCT Blood Glucose.: 103 mg/dL (17 May 2025 12:52)  POCT Blood Glucose.: 124 mg/dL (17 May 2025 05:21)                          8.3    12.14 )-----------( 170      ( 18 May 2025 00:00 )             25.5         05-18    141  |  109  |  30[H]  ----------------------------<  91  3.2[L]   |  21  |  1.0      Calcium: 11.0 mg/dL (05-18-25 @ 00:00)      LFTs:             4.0  | 0.3  | 11       ------------------[75      ( 16 May 2025 19:10 )  2.3  | 0.2  | 9           Lipase:x      Amylase:x         Blood Gas Arterial, Lactate: 1.0 mmol/L (05-17-25 @ 08:44)  Blood Gas Arterial, Lactate: 1.1 mmol/L (05-17-25 @ 03:59)  Blood Gas Arterial, Lactate: 1.0 mmol/L (05-16-25 @ 20:56)  Blood Gas Arterial, Lactate: 1.0 mmol/L (05-16-25 @ 14:48)  Blood Gas Venous - Lactate: 1.2 mmol/L (05-16-25 @ 06:00)  Blood Gas Venous - Lactate: 1.2 mmol/L (05-15-25 @ 18:02)  Blood Gas Arterial, Lactate: 1.1 mmol/L (05-15-25 @ 05:05)    ABG - ( 17 May 2025 08:44 )  pH: 7.44  /  pCO2: 31    /  pO2: 158   / HCO3: 21    / Base Excess: -2.6  /  SaO2: 99.1            ABG - ( 17 May 2025 03:59 )  pH: 7.49  /  pCO2: 24    /  pO2: 188   / HCO3: 18    / Base Excess: -4.3  /  SaO2: 99.5            ABG - ( 16 May 2025 20:56 )  pH: 7.45  /  pCO2: 28    /  pO2: 199   / HCO3: 20    / Base Excess: -3.9  /  SaO2: 100.0             Coags:            Urinalysis Basic - ( 18 May 2025 00:00 )    Color: x / Appearance: x / SG: x / pH: x  Gluc: 91 mg/dL / Ketone: x  / Bili: x / Urobili: x   Blood: x / Protein: x / Nitrite: x   Leuk Esterase: x / RBC: x / WBC x   Sq Epi: x / Non Sq Epi: x / Bacteria: x

## 2025-05-18 NOTE — PROGRESS NOTE ADULT - ASSESSMENT
Patient is a 74y old Female s/p take back and second look laparotomy with billroth 2 and replacement of GJ    Plan:  - trend Hb  - Monitor BULMARO drain   - Continue TF as tolerated  - wean off pressors as tolerated  - Monitor post op labs  - Monitor J site for any bleeding.  - NGT has to stay, as  J is in the gastrostomy through anastomosis.

## 2025-05-18 NOTE — PATIENT PROFILE ADULT - FALL HARM RISK - HARM RISK INTERVENTIONS

## 2025-05-18 NOTE — PROGRESS NOTE ADULT - SUBJECTIVE AND OBJECTIVE BOX
PATRICIA SPRAGUE   [id number]   [birth year]    74yF  ============================================================   DATE OF INITIAL SICU/SDU CONSULT: 05-13-25    INDICATION FOR SICU CONSULT:  perforated duodenal ulcer      SICU COURSE EVENTS :  05-13 - admitted to SICU service  05-14 - s/p OR for exploratory laparotomy, started remdesivir for covid +, echo ordered  5/15 - Echo showing EF 30-35%, cardiology consulted. Pending RTOR. Remains on vasopressors, weaning.  5/16: S/p Exploratory laparotomy, bilroth 2, J tube placement. Episode of wheezing. Changed to meropenem per ID. AFIB slow ventricular response HR 40s-60s, remains on low dose pressors   5/17: Tolerating T piece, trickle feed started via J tube, s/p 1u PRBC to wean off levophed, remains on vasopressin, IVL     24HOUR EVENTS  5/17  NIGHT   -PM rounds: HR 90s, saturating 98% on t-piece 40%,  on vaso @ 0.03, remains off levophed, BULMARO drain serosanguinous, NGT, J tube w/ trickle feeds 10cc/hr, abdomen distended, tender, BM during day shift  -23:30 labs   -BCx no growth   -AM CXR/ABG   -AM KUB    DAY  -switching to PO APAP and oxycodone via NGT  -trickle feeds via J tube  -f/u with GI if can switch J tube to GJ on Monday   -1U PRBC to assist with weaning pressors--> post transfusion 8.4  -contacted urology to exchange Burger for Coudet  - IVL  - tolerating T-piece        [X] A ten-point review of systems was negative except as expressed in note.  [X] History was obtained from patient. If unable to participate in their care, history obtained from review of the chart and collateral sources of information.  ============================================================    PATRICIA SPRAGUE   [id number]   [birth year]    74yF  ============================================================   DATE OF INITIAL SICU/SDU CONSULT: 05-13-25    INDICATION FOR SICU CONSULT:  perforated duodenal ulcer      SICU COURSE EVENTS :  05-13 - admitted to SICU service  05-14 - s/p OR for exploratory laparotomy, started remdesivir for covid +, echo ordered  5/15 - Echo showing EF 30-35%, cardiology consulted. Pending RTOR. Remains on vasopressors, weaning.  5/16: S/p Exploratory laparotomy, bilroth 2, J tube placement. Episode of wheezing. Changed to meropenem per ID. AFIB slow ventricular response HR 40s-60s, remains on low dose pressors   5/17: Tolerating T piece, trickle feed started via J tube, s/p 1u PRBC to wean off levophed, remains on vasopressin, IVL     24HOUR EVENTS  5/17  NIGHT   -PM rounds: HR 90s, saturating 98% on t-piece 40%,  on vaso @ 0.03, remains off levophed, BULMARO drain serosanguinous, NGT, J tube w/ trickle feeds 10cc/hr, abdomen distended, tender, BM during day shift  -23:30 labs   -BCx no growth   -AM CXR/ABG   -AM KUB    DAY  -switching to PO APAP and oxycodone via NGT  -trickle feeds via J tube  -f/u with GI if can switch J tube to GJ on Monday   -1U PRBC to assist with weaning pressors--> post transfusion 8.4  -contacted urology to exchange Burger for Coudet  - IVL  - tolerating T-piece    Physical Exam:  Neuro-GCS 11T, eye opening spontaneously follows commands  Resp- fine crackles B/L, trach to t-piece  Cardiac- S1 S2, RRR, no murmur  Abd- soft, mild tenderness to the LUG, PEJ tube in place without drainage or erythema at the insertion site, LLQ BULMARO drain in place with serous drainage  MSK- ROM x 4, strength 3/5, distal pulses intact      [X] A ten-point review of systems was negative except as expressed in note.  [X] History was obtained from patient. If unable to participate in their care, history obtained from review of the chart and collateral sources of information.  ============================================================

## 2025-05-18 NOTE — PROGRESS NOTE ADULT - ATTENDING COMMENTS
Visited pt at bedside. Denies any pain during visit. Able to communicate by nodding head yes/no and thumb movements.     PE:  General; female, in bed, comfortable  Head; NC/AT  Heart: RRR  Lungs; even chest rise, on trach-collar  Abdomen: soft, obese abdomen, non-tender, PEJ bumper in place, midline wound dressing clean and dry    A/P:  74 F with perforated ulcer s/p antrectomy, left in discontinuity, taken back to OR on 5/16/25 for gastro-jejunal anastomosis, drain placement.   - Wean vasopressin  - Will start midodrine for MAP goals >65  - Continue DVT PPx w/HSQ  - Continue trach collar  - Will increase trickle feeds via jejunostomy tube   - Keep NG tube for decompression at this time  - Analgesia w/tylenol/oxy  - Ca elevated at baseline, will start home cinacalcet  - Continue meropenem/caspo  - Monitor urine output; chronic Burger exchanged today  - CBG goals <180  - K to 4, Mg to 2, P to 3 Visited pt at bedside. Denies any pain during visit. Able to communicate by nodding head yes/no and thumb movements.     PE:  General; female, in bed, comfortable  Head; NC/AT  Heart: RRR  Lungs; even chest rise, on trach-collar  Abdomen: soft, obese abdomen, non-tender, PEJ bumper in place, midline wound dressing clean and dry    A/P:  74 F with perforated ulcer s/p antrectomy, left in discontinuity, taken back to OR on 5/16/25 for gastro-jejunal anastomosis, drain placement.   - Wean vasopressin  - Will start midodrine for MAP goals >65  - Continue DVT PPx w/HSQ  - Continue trach collar  - Will increase trickle feeds via jejunostomy tube   - Keep NG tube for decompression at this time  - Analgesia w/tylenol/oxy  - Ca elevated at baseline, will start home cinacalcet  - Continue meropenem/caspo  - Monitor urine output; chronic Burger exchanged 5/17  - CBG goals <180  - K to 4, Mg to 2, P to 3 Visited pt at bedside. Denies any pain during visit. Able to communicate by nodding head yes/no and thumb movements.     PE:  General; female, in bed, comfortable  Head; NC/AT  Heart: RRR  Lungs; even chest rise, on trach-collar  Abdomen: soft, obese abdomen, non-tender, PEJ bumper in place, midline wound dressing clean and dry    A/P:  74 F with perforated ulcer s/p antrectomy, left in discontinuity, taken back to OR on 5/16/25 for gastro-jejunal anastomosis, drain placement.   - Wean vasopressin  - Will start midodrine for MAP goals >65  - Continue DVT PPx w/HSQ  - Continue trach collar  - Will increase trickle feeds via jejunostomy tube   - Keep NG tube for decompression at this time  - Analgesia w/tylenol/oxy  - Ca elevated at baseline, will start home cinacalcet  - Continue meropenem/caspo  - Monitor urine output; chronic Burger exchanged 5/17  - CBG goals <180  - K to 4, Mg to 2, P to 3  - Monitor maliha drain outputs; reported 300 overnight, will document during the day; concerned for a leak at duodenal stump vs GJ anastomosis

## 2025-05-18 NOTE — PROGRESS NOTE ADULT - ASSESSMENT
74F found to have perforated peptic ulcer now s/p ex lap, distal antrectomy, abthera placement requiring vasopressors. RTOR 5/16 s/p second look laparotomy, Billroth II reconstruction, gastrojejunostomy tube replaced with a jejunal feeding tube, abdomen closed at the end of the case.     NEUROLOGICAL:  #Acute pain    - PO APAP prn    - oxycodone 2.5mg prn moderate pain   - oxycodone 5mg prn severe pain    RESPIRATORY:   #Mechanical ventilation, s/p previous tracheostomy (11/24)    - now with 8 portex cuffed trach (switched in OR on 5/14)    - Vent setting now 450/14/40/10 > ABG - ( 17 May 2025 08:44 )pH, Arterial: 7.44  pH, Blood: x     /  pCO2: 31    /  pO2: 158   / HCO3: 21    / Base Excess: -2.6  /  SaO2: 99.1      - 5/17: tolerating T piece     - continue duonebs   #Decreased small bibasilar opacities/atelectasis, right greater than left on CT  - 5/16: s/p deep suctioning by anesthesia w/ copious secretions   #h/o asthma  - atrovent and albuterol   #Activity   - increase as tolerated     CARDS:   #Nonsustained Vtach - self resolved   - cards consult: management of septic shock. Start short acting AC for afib. Start beta blockers once off pressors   #hypotension 2/2 sepsis   - off levophed gtt 5/17   - Remains on vasopressin @0.03  - MAP >65  #hx afib  - transiently was in AFIB slow ventricular response (HR 40s-60s), remains on low dose pressors   - holding home Xarelto 15 QD  #hx HTN  - holding home losartan 100QD  #elevated troponin  - 143 > 84   - no longer trending  #TTE 11/24: EF 71%. Mild-mod AS. Trivial pericardial effusion.   - Echo 5/15: EF 30-35%, multiple left ventricular motion wall abnormalities. Possible Takotsubo vs ischemic. Global left ventricular systolic function.     GASTROINTESTINAL/NUTRITION:   #perforated prepyloric gastric ulcer s/p exploratory laparotomy antrectomy and D1 stapled off with temporary abdominal closure and abthera in place  - 5/16: RTOR s/p bilroth 2, J tube placement, RLQ maliha drain by anastomosis extending to duodenal stump  #Diet, glucerna 10cc/hr via J     - J tube, tolerating trickle feeds    - medications through NGT    - aspiration precautions, HOB 30  #GI Prophylaxis    - protonix 40mg bid IV  #Bowel regimen    -holding    -last bowel movement 5/17     /RENAL:   #urine output in critically ill    -chronic indwelling ya > new ya exchanged 5/17  #Maintain euvolemia   - IVL  #metabolic acidosis, resolved  #NOLA (baseline creatine 0.6), improving   - nephrology consulted > no RRT at this time  #oliguria  - intermittent albumin   #hx congential solitary kidney   #Hypomagnesemia - repleted with 4g Mg  #Hypokalemia - repleted with 40mEq KCl     Labs:          BUN/Cr- 29/1.1  -->,  30/1.1  -->          [05-17 @ 02:22]Na  138 // K  3.9 // Mg  2.7 // Phos  3.2    HEME/ONC:   #DVT prophylaxis    -HSQ    -SCDs  #anemia  - 1pRBC 5/17 to help wean off vasopressors    Labs: Hb/Hct:  7.7/23.9  -->,  8.4/25.4  -->                      Plts:  240  -->,  162  -->                 PTT/INR:        ID:  WBC- 16.67  --->>,  13.13  --->>,  11.98  --->>  Temp trend- 24hrs T(F): 97.6 (05-17 @ 20:22), Max: 97.8 (05-17 @ 04:00)  #perforated duodenal ulcer  - zosyn (renal dosing) (5/14 - 5/16)  - Meropenem (5/16 - )  - caspofungin per primary team (5/14 - )  - Nystatin powder for groin rash (5/15 - )  #COVID positive on admission    - remdesivir 200mg x1, 100mg x 2 day course  #Cultures    - 5/13: blood culture: NGTD    - 5/13: blood culture: NGTD    - 5/13: urine culture: E Coli  - ID following    ENDOCRINE:  #glycemic monitoring    -FSG q6 while NPO    -Glucose goal 140-180. If above 180, will start corrective insulin sliding scale  #hypercalcemia  - holding home cinacalcet 30mg bid    MSK:  #Activity - increase as tolerated   #hx osteoporosis  - holding home Alendronate 70mg weekly on Thursdays while NPO    SKIN:  #DTI screening negative 5/17    - will continue to monitor daily skin changes      PALLIATIVE:  Currently full code, palliative consult       LINES/DRAINS:  PIV, Ya (replaced 5/17), J tube (5/16), 8 cuffed portex trach, R radial arterial line (5/14- ), LIJ CVC (5/14- )  ADVANCED DIRECTIVES:  Full Code    HCP/Emergency Contact- daughter Cecilia [phone]  INDICATION FOR SICU/SDU:  perforated peptic ulcer; mechanical ventilation/vasopressors   DISPO:  SICU. Case to be discussed with attending Dr. Pena 74F found to have perforated peptic ulcer now s/p ex lap, distal antrectomy, abthera placement requiring vasopressors. RTOR 5/16 s/p second look laparotomy, Billroth II reconstruction, gastrojejunostomy tube replaced with a jejunal feeding tube, abdomen closed at the end of the case.     NEUROLOGICAL:  #Acute pain    - PO APAP prn    - oxycodone 2.5mg prn moderate pain   - oxycodone 5mg prn severe pain    RESPIRATORY:   #Mechanical ventilation, s/p previous tracheostomy (11/24)    - now with 8 portex cuffed trach (switched in OR on 5/14)    - Vent setting now 450/14/40/10 > ABG - ( 17 May 2025 08:44 )pH, Arterial: 7.44  pH, Blood: x     /  pCO2: 31    /  pO2: 158   / HCO3: 21    / Base Excess: -2.6  /  SaO2: 99.1      - 5/17: tolerating T piece     - continue duonebs   #Decreased small bibasilar opacities/atelectasis, right greater than left on CT  - 5/16: s/p deep suctioning by anesthesia w/ copious secretions   #h/o asthma  - atrovent and albuterol   #Activity   - increase as tolerated     CARDS:   #Nonsustained Vtach - self resolved   - cards consult: management of septic shock. Start short acting AC for afib. Start beta blockers once off pressors   #hypotension 2/2 sepsis   - off levophed gtt 5/17   - Remains on vasopressin @0.03  - MAP >65  #hx afib  - transiently was in AFIB slow ventricular response (HR 40s-60s), remains on low dose pressors   - holding home Xarelto 15 QD  #hx HTN  - holding home losartan 100QD  #elevated troponin  - 143 > 84   - no longer trending  #TTE 11/24: EF 71%. Mild-mod AS. Trivial pericardial effusion.   - Echo 5/15: EF 30-35%, multiple left ventricular motion wall abnormalities. Possible Takotsubo vs ischemic. Global left ventricular systolic function.     GASTROINTESTINAL/NUTRITION:   #perforated prepyloric gastric ulcer s/p exploratory laparotomy antrectomy and D1 stapled off with temporary abdominal closure and abthera in place  - 5/16: RTOR s/p bilroth 2, J tube placement, RLQ maliha drain by anastomosis extending to duodenal stump  #Diet, glucerna 10cc/hr via J     - J tube, tolerating trickle feeds    - medications through NGT    - aspiration precautions, HOB 30  #GI Prophylaxis    - protonix 40mg bid IV  #Bowel regimen    -holding    -last bowel movement 5/17     /RENAL:   #urine output in critically ill    -chronic indwelling ya > new ya exchanged 5/17  #Maintain euvolemia   - IVL  #metabolic acidosis, resolved  #NOLA (baseline creatine 0.6), improving   - nephrology consulted > no RRT at this time  #oliguria  - intermittent albumin   #hx congential solitary kidney   #Hypomagnesemia - repleted with 4g Mg  #Hypokalemia - repleted with 40mEq KCl     Labs:          BUN/Cr- 29/1.1  -->,  30/1.1  -->30/1.0          [05-18 @00:00  ]Na  141// K  3.2 // Mg  2.17 // Phos  2.8    HEME/ONC:   #DVT prophylaxis    -HSQ    -SCDs  #anemia  - 1pRBC 5/17 to help wean off vasopressors    Labs: Hb/Hct:  7.7/23.9  -->,  8.4/25.4  -->   8.3/25                   Plts:  240  -->,  162  -->  170               PTT/INR:        ID:  WBC- 16.67  --->>,  13.13  --->>,  11.98  --->>12.2  Temp trend- 24hrs T(F): 97.6 (05-17 @ 20:22), Max: 97.8 (05-17 @ 04:00)  #perforated duodenal ulcer  - zosyn (renal dosing) (5/14 - 5/16)  - Meropenem (5/16 - )  - caspofungin per primary team (5/14 - )  - Nystatin powder for groin rash (5/15 - )  #COVID positive on admission    - remdesivir 200mg x1, 100mg x 2 day course  #Cultures    - 5/13: blood culture: NGTD    - 5/13: blood culture: NGTD    - 5/13: urine culture: E Coli  - ID following    ENDOCRINE:  #glycemic monitoring    -FSG q6 while NPO    -Glucose goal 140-180. If above 180, will start corrective insulin sliding scale  #hypercalcemia  - holding home cinacalcet 30mg bid    MSK:  #Activity - increase as tolerated   #hx osteoporosis  - holding home Alendronate 70mg weekly on Thursdays while NPO    SKIN:  #DTI screening negative 5/17    - will continue to monitor daily skin changes      PALLIATIVE:  Currently full code, palliative consult       LINES/DRAINS:  PIV, Ya (replaced 5/17), J tube (5/16), 8 cuffed portex trach, R radial arterial line (5/14- ), LIJ CVC (5/14- )  ADVANCED DIRECTIVES:  Full Code    HCP/Emergency Contact- daughter Cecilia [phone]  INDICATION FOR SICU/SDU:  perforated peptic ulcer; mechanical ventilation/vasopressors   DISPO:  SICU. Case   discussed with attending Dr. Pena

## 2025-05-19 LAB
ALBUMIN SERPL ELPH-MCNC: 2.4 G/DL — LOW (ref 3.5–5.2)
ALP SERPL-CCNC: 82 U/L — SIGNIFICANT CHANGE UP (ref 30–115)
ALT FLD-CCNC: 9 U/L — SIGNIFICANT CHANGE UP (ref 0–41)
ANION GAP SERPL CALC-SCNC: 11 MMOL/L — SIGNIFICANT CHANGE UP (ref 7–14)
ANION GAP SERPL CALC-SCNC: 8 MMOL/L — SIGNIFICANT CHANGE UP (ref 7–14)
AST SERPL-CCNC: 11 U/L — SIGNIFICANT CHANGE UP (ref 0–41)
BASOPHILS # BLD AUTO: 0 K/UL — SIGNIFICANT CHANGE UP (ref 0–0.2)
BASOPHILS NFR BLD AUTO: 0 % — SIGNIFICANT CHANGE UP (ref 0–1)
BILIRUB DIRECT SERPL-MCNC: 0.2 MG/DL — SIGNIFICANT CHANGE UP (ref 0–0.3)
BILIRUB INDIRECT FLD-MCNC: 0.2 MG/DL — SIGNIFICANT CHANGE UP (ref 0.2–1.2)
BILIRUB SERPL-MCNC: 0.4 MG/DL — SIGNIFICANT CHANGE UP (ref 0.2–1.2)
BUN SERPL-MCNC: 29 MG/DL — HIGH (ref 10–20)
BUN SERPL-MCNC: 31 MG/DL — HIGH (ref 10–20)
CALCIUM SERPL-MCNC: 10.4 MG/DL — SIGNIFICANT CHANGE UP (ref 8.4–10.5)
CALCIUM SERPL-MCNC: 10.9 MG/DL — HIGH (ref 8.4–10.5)
CHLORIDE SERPL-SCNC: 108 MMOL/L — SIGNIFICANT CHANGE UP (ref 98–110)
CHLORIDE SERPL-SCNC: 109 MMOL/L — SIGNIFICANT CHANGE UP (ref 98–110)
CO2 SERPL-SCNC: 21 MMOL/L — SIGNIFICANT CHANGE UP (ref 17–32)
CO2 SERPL-SCNC: 22 MMOL/L — SIGNIFICANT CHANGE UP (ref 17–32)
CREAT SERPL-MCNC: 0.9 MG/DL — SIGNIFICANT CHANGE UP (ref 0.7–1.5)
CREAT SERPL-MCNC: 0.9 MG/DL — SIGNIFICANT CHANGE UP (ref 0.7–1.5)
EGFR: 68 ML/MIN/1.73M2 — SIGNIFICANT CHANGE UP
EOSINOPHIL # BLD AUTO: 0.22 K/UL — SIGNIFICANT CHANGE UP (ref 0–0.7)
EOSINOPHIL NFR BLD AUTO: 2.3 % — SIGNIFICANT CHANGE UP (ref 0–8)
GAS PNL BLDA: SIGNIFICANT CHANGE UP
GLUCOSE SERPL-MCNC: 75 MG/DL — SIGNIFICANT CHANGE UP (ref 70–99)
GLUCOSE SERPL-MCNC: 90 MG/DL — SIGNIFICANT CHANGE UP (ref 70–99)
HCT VFR BLD CALC: 22 % — LOW (ref 37–47)
HGB BLD-MCNC: 7.1 G/DL — LOW (ref 12–16)
IMM GRANULOCYTES NFR BLD AUTO: 0.7 % — HIGH (ref 0.1–0.3)
LYMPHOCYTES # BLD AUTO: 1.05 K/UL — LOW (ref 1.2–3.4)
LYMPHOCYTES # BLD AUTO: 10.9 % — LOW (ref 20.5–51.1)
MAGNESIUM SERPL-MCNC: 1.9 MG/DL — SIGNIFICANT CHANGE UP (ref 1.8–2.4)
MAGNESIUM SERPL-MCNC: 1.9 MG/DL — SIGNIFICANT CHANGE UP (ref 1.8–2.4)
MCHC RBC-ENTMCNC: 27.8 PG — SIGNIFICANT CHANGE UP (ref 27–31)
MCHC RBC-ENTMCNC: 31.8 G/DL — LOW (ref 32–37)
MCV RBC AUTO: 87.3 FL — SIGNIFICANT CHANGE UP (ref 81–99)
MONOCYTES # BLD AUTO: 0.46 K/UL — SIGNIFICANT CHANGE UP (ref 0.1–0.6)
MONOCYTES NFR BLD AUTO: 4.8 % — SIGNIFICANT CHANGE UP (ref 1.7–9.3)
NEUTROPHILS # BLD AUTO: 7.84 K/UL — HIGH (ref 1.4–6.5)
NEUTROPHILS NFR BLD AUTO: 81.3 % — HIGH (ref 42.2–75.2)
NRBC BLD AUTO-RTO: 0 /100 WBCS — SIGNIFICANT CHANGE UP (ref 0–0)
PHOSPHATE SERPL-MCNC: 2.8 MG/DL — SIGNIFICANT CHANGE UP (ref 2.1–4.9)
PHOSPHATE SERPL-MCNC: 2.8 MG/DL — SIGNIFICANT CHANGE UP (ref 2.1–4.9)
PLATELET # BLD AUTO: 187 K/UL — SIGNIFICANT CHANGE UP (ref 130–400)
PMV BLD: 11.1 FL — HIGH (ref 7.4–10.4)
POTASSIUM SERPL-MCNC: 3.9 MMOL/L — SIGNIFICANT CHANGE UP (ref 3.5–5)
POTASSIUM SERPL-MCNC: 4 MMOL/L — SIGNIFICANT CHANGE UP (ref 3.5–5)
POTASSIUM SERPL-SCNC: 3.9 MMOL/L — SIGNIFICANT CHANGE UP (ref 3.5–5)
POTASSIUM SERPL-SCNC: 4 MMOL/L — SIGNIFICANT CHANGE UP (ref 3.5–5)
PROT SERPL-MCNC: 4.2 G/DL — LOW (ref 6–8)
RBC # BLD: 2.52 M/UL — LOW (ref 4.2–5.4)
RBC # FLD: 16.6 % — HIGH (ref 11.5–14.5)
SODIUM SERPL-SCNC: 139 MMOL/L — SIGNIFICANT CHANGE UP (ref 135–146)
SODIUM SERPL-SCNC: 140 MMOL/L — SIGNIFICANT CHANGE UP (ref 135–146)
WBC # BLD: 9.64 K/UL — SIGNIFICANT CHANGE UP (ref 4.8–10.8)
WBC # FLD AUTO: 9.64 K/UL — SIGNIFICANT CHANGE UP (ref 4.8–10.8)

## 2025-05-19 PROCEDURE — 71260 CT THORAX DX C+: CPT | Mod: 26

## 2025-05-19 PROCEDURE — 74177 CT ABD & PELVIS W/CONTRAST: CPT | Mod: 26

## 2025-05-19 PROCEDURE — 99291 CRITICAL CARE FIRST HOUR: CPT | Mod: 24,25

## 2025-05-19 PROCEDURE — 71045 X-RAY EXAM CHEST 1 VIEW: CPT | Mod: 26

## 2025-05-19 RX ORDER — HYDROMORPHONE/SOD CHLOR,ISO/PF 2 MG/10 ML
0.25 SYRINGE (ML) INJECTION EVERY 6 HOURS
Refills: 0 | Status: DISCONTINUED | OUTPATIENT
Start: 2025-05-19 | End: 2025-05-26

## 2025-05-19 RX ORDER — ENOXAPARIN SODIUM 100 MG/ML
40 INJECTION SUBCUTANEOUS EVERY 24 HOURS
Refills: 0 | Status: DISCONTINUED | OUTPATIENT
Start: 2025-05-19 | End: 2025-05-29

## 2025-05-19 RX ORDER — ACETAMINOPHEN 500 MG/5ML
1000 LIQUID (ML) ORAL ONCE
Refills: 0 | Status: COMPLETED | OUTPATIENT
Start: 2025-05-19 | End: 2025-05-19

## 2025-05-19 RX ORDER — IOHEXOL 350 MG/ML
30 INJECTION, SOLUTION INTRAVENOUS ONCE
Refills: 0 | Status: DISCONTINUED | OUTPATIENT
Start: 2025-05-19 | End: 2025-05-19

## 2025-05-19 RX ORDER — MAGNESIUM SULFATE 500 MG/ML
1 SYRINGE (ML) INJECTION ONCE
Refills: 0 | Status: COMPLETED | OUTPATIENT
Start: 2025-05-19 | End: 2025-05-19

## 2025-05-19 RX ORDER — MEROPENEM 1 G/30ML
1000 INJECTION INTRAVENOUS EVERY 8 HOURS
Refills: 0 | Status: COMPLETED | OUTPATIENT
Start: 2025-05-19 | End: 2025-05-24

## 2025-05-19 RX ORDER — POTASSIUM PHOSPHATE, MONOBASIC POTASSIUM PHOSPHATE, DIBASIC INJECTION, 236; 224 MG/ML; MG/ML
15 SOLUTION, CONCENTRATE INTRAVENOUS ONCE
Refills: 0 | Status: COMPLETED | OUTPATIENT
Start: 2025-05-19 | End: 2025-05-19

## 2025-05-19 RX ORDER — SODIUM PHOSPHATE,DIBASIC DIHYD
15 POWDER (GRAM) MISCELLANEOUS ONCE
Refills: 0 | Status: COMPLETED | OUTPATIENT
Start: 2025-05-19 | End: 2025-05-19

## 2025-05-19 RX ORDER — IOHEXOL 350 MG/ML
30 INJECTION, SOLUTION INTRAVENOUS ONCE
Refills: 0 | Status: COMPLETED | OUTPATIENT
Start: 2025-05-19 | End: 2025-05-19

## 2025-05-19 RX ADMIN — Medication 1 PUFF(S): at 08:15

## 2025-05-19 RX ADMIN — Medication 100 GRAM(S): at 01:26

## 2025-05-19 RX ADMIN — ENOXAPARIN SODIUM 40 MILLIGRAM(S): 100 INJECTION SUBCUTANEOUS at 10:12

## 2025-05-19 RX ADMIN — Medication 15 MILLILITER(S): at 05:35

## 2025-05-19 RX ADMIN — Medication 0.25 MILLIGRAM(S): at 17:39

## 2025-05-19 RX ADMIN — Medication 15 MILLILITER(S): at 17:26

## 2025-05-19 RX ADMIN — Medication 2 PUFF(S): at 08:15

## 2025-05-19 RX ADMIN — Medication 40 MILLIGRAM(S): at 17:26

## 2025-05-19 RX ADMIN — CASPOFUNGIN ACETATE 260 MILLIGRAM(S): 5 INJECTION, POWDER, LYOPHILIZED, FOR SOLUTION INTRAVENOUS at 22:03

## 2025-05-19 RX ADMIN — Medication 2 PUFF(S): at 02:00

## 2025-05-19 RX ADMIN — Medication 2 PUFF(S): at 20:20

## 2025-05-19 RX ADMIN — IOHEXOL 30 MILLILITER(S): 350 INJECTION, SOLUTION INTRAVENOUS at 17:27

## 2025-05-19 RX ADMIN — HEPARIN SODIUM 5000 UNIT(S): 1000 INJECTION INTRAVENOUS; SUBCUTANEOUS at 05:34

## 2025-05-19 RX ADMIN — SODIUM CHLORIDE 50 MILLILITER(S): 9 INJECTION, SOLUTION INTRAVENOUS at 00:00

## 2025-05-19 RX ADMIN — MEROPENEM 100 MILLIGRAM(S): 1 INJECTION INTRAVENOUS at 05:35

## 2025-05-19 RX ADMIN — Medication 40 MILLIGRAM(S): at 05:35

## 2025-05-19 RX ADMIN — NYSTATIN 1 APPLICATION(S): 100000 CREAM TOPICAL at 17:39

## 2025-05-19 RX ADMIN — Medication 1 PUFF(S): at 02:00

## 2025-05-19 RX ADMIN — MEROPENEM 100 MILLIGRAM(S): 1 INJECTION INTRAVENOUS at 21:50

## 2025-05-19 RX ADMIN — Medication 63.75 MILLIMOLE(S): at 01:26

## 2025-05-19 RX ADMIN — Medication 400 MILLIGRAM(S): at 08:05

## 2025-05-19 RX ADMIN — Medication 0.25 MILLIGRAM(S): at 19:24

## 2025-05-19 RX ADMIN — NYSTATIN 1 APPLICATION(S): 100000 CREAM TOPICAL at 05:33

## 2025-05-19 RX ADMIN — Medication 1 PUFF(S): at 14:31

## 2025-05-19 RX ADMIN — Medication 100 GRAM(S): at 23:36

## 2025-05-19 RX ADMIN — Medication 1 APPLICATION(S): at 05:31

## 2025-05-19 RX ADMIN — Medication 1 PUFF(S): at 20:20

## 2025-05-19 RX ADMIN — MEROPENEM 100 MILLIGRAM(S): 1 INJECTION INTRAVENOUS at 14:10

## 2025-05-19 RX ADMIN — Medication 2 PUFF(S): at 14:30

## 2025-05-19 NOTE — PROGRESS NOTE ADULT - ASSESSMENT
ASSESSMENT  81F PMHx HTN, a-fib (on xarelto), solitary kidney, DM, GERD, prior open cholecystectomy c/b suture granulomas s/p 2019 abdominal wall debridements by Dr. Banda, respiratory distress with prolonged ventilation during 11/2024 admission, s/p 11/19 tracheostomy and gastrostomy and feeding jejunostomy tube by Dr. Champion, ya catheter dependence who presented to the ED from NH on 5/13 with 4 days progressively worsening abdominal pain. Surgery consulted for imaging concerning for duodenal perforation. S/p OR, ID consulted for antimicrobial management       IMPRESSION  #Perforated Duodenal Ulcer, septic shock requiring pressors     Afebrile; Admission WBC 18    5/13 BCX NGTD     5/13 UCX   >100,000 CFU/ml Escherichia coli ESBL S zosyn  Procedure	PROCEDURES:  Partial antrectomy 14-May-2025 03:37:14  Carlos Cheatham  Operative Findings	prepyloric ulcer 4 cm perforation anterior stomach s/p antrectomy, D1 stapled off due to friable tissue to ensure healthy tissue for staple line. Temporary abdominal closure due to HD instability.  < from: CT Abdomen and Pelvis w/ IV Cont (05.13.25 @ 18:16) >  *1. Since November 8, 2024, new moderate mural thickening and inflammation involving the proximal proximal duodenal wall, with focal   defect/outpouching along lateral duodenum, compatible with perforated duodenal ulcer; no focal drainable fluid collection.  2. Additional chronic/incidental findings, as detailed above.  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    Height (cm): 167.6 (05-14-25 @ 00:10)  Weight (kg): 87 (05-14-25 @ 00:10)    RECOMMENDATIONS  - Meropenem 1g q8h iv   - Continue caspofungin IVPB 50 milliGRAM(s) IV Intermittent every 24 hours  - F/u OR path    If any questions, please send a message or call on [facility]  Please continue to update ID with any pertinent new laboratory, radiographic findings, or change in clinical status

## 2025-05-19 NOTE — PROGRESS NOTE ADULT - ASSESSMENT
Patient is a 74y old Female POD 5 s/p exlap  and second look laparotomy with billroth II   and replacement of GJ    Plan:  - SICU management   - wean pressors as tolerated   - saturating well on T piece   - BULMARO drain with serobilious output (new from previous) will get CT C/A/P today to eval for leak   - pain control   - monitor output   - NPO for now. holding feeds. J tube clamped

## 2025-05-19 NOTE — PROGRESS NOTE ADULT - ASSESSMENT
74F found to have perforated peptic ulcer now s/p ex lap, distal antrectomy, abthera placement requiring vasopressors. RTOR 5/16 s/p second look laparotomy, Billroth II reconstruction, gastrojejunostomy tube replaced with a jejunal feeding tube, abdomen closed at the end of the case.     NEUROLOGICAL:  #Acute pain   - IV APAP while NPO   - oxycodone 2.5mg prn moderate pain, holding    - oxycodone 5mg prn severe pain, holding     RESPIRATORY:   #Respiratory failure, s/p tracheostomy (11/24)    - now with 8 portex cuffed trach (switched in OR on 5/14)    - toelrating T- piece x 24 hrs (5/17)    - AM ABG    - continue duonebs   #Decreased small bibasilar opacities/atelectasis, right greater than left on CT  - 5/16: s/p deep suctioning by anesthesia w/ copious secretions   #h/o asthma  - atrovent and albuterol   #Activity   - increase as tolerated     CARDS:   #Nonsustained Vtach - self resolved   - cards consult: management of septic shock. Start short acting AC for afib. Start beta blockers once off pressors   #hypotension 2/2 sepsis   - off levophed gtt 5/17   - Remains on vasopressin @0.03  - MAP >65  - Started midodrine 10 mg Q8h started 5/18, holding while strict NPO   #hx afib  - transiently was in AFIB slow ventricular response (HR 40s-60s), remains on low dose pressors   - holding home Xarelto 15 QD  #hx HTN  - holding home losartan 100QD  #elevated troponin  - 143 > 84   - no longer trending  #TTE 11/24: EF 71%. Mild-mod AS. Trivial pericardial effusion.   - Echo 5/15: EF 30-35%, multiple left ventricular motion wall abnormalities. Possible Takotsubo vs ischemic. Global left ventricular systolic function.     GASTROINTESTINAL/NUTRITION:   #perforated prepyloric gastric ulcer s/p exploratory laparotomy antrectomy and D1 stapled off with temporary abdominal closure and abthera in place  - 5/16: RTOR s/p bilroth 2, J tube placement, RLQ maliha drain by anastomosis extending to duodenal stump  - 5/18: BULMARO drain now w/ bilious output > pt made strict NPO, f/u drain creatinine/bilirubin composition, LFTs WNL     Liver Biochemical Testing Trend:  Aspartate Aminotransferase (AST/SGOT): 11 (05-18-25 @ 23:22)  Alanine Aminotransferase (ALT/SGPT): 9 (05-18-25 @ 23:22)  Bilirubin Direct: 0.2 (05-18-25 @ 23:22)    #Diet, STRICT NPO     - J tube, was tolerating trickle feeds > held 2/2 bilious output from BULMARO drain     - medications through NGT    - aspiration precautions, HOB 30  #GI Prophylaxis    - protonix 40mg bid IV  #Bowel regimen    -holding    -last bowel movement 5/17      /RENAL:   #urine output in critically ill    -chronic indwelling ya > new ya exchanged 5/17  #Maintain euvolemia   - LR @ 50cc/hr while NPO   #metabolic acidosis, resolved  #NOLA (baseline creatine 0.6), improving   - nephrology consulted > no RRT at this time  #oliguria  - intermittent albumin     Labs:          BUN/Cr- 31/1.0  -->,  31/0.9  -->          [05-18 @ 23:22]Na  139 // K  4.0 // Mg  1.9 // Phos  2.8    HEME/ONC:   #DVT prophylaxis    -HSQ    -SCDs  #anemia  - 1pRBC 5/17 to help wean off vasopressors    Labs: Hb/Hct:  8.3/25.5  -->,  7.8/24.1  -->                      Plts:  170  -->,  168  -->                 PTT/INR:        ID:  WBC- 11.98  --->>,  12.14  --->>,  12.59  --->>  Temp trend- 24hrs T(F): 96.9 (05-18 @ 20:33), Max: 98.2 (05-18 @ 04:00)  #perforated duodenal ulcer  - zosyn (renal dosing) (5/14 - 5/16)  - Meropenem (5/16 - )  - caspofungin per primary team (5/14 - )  - Nystatin powder for groin rash (5/15 - )  #COVID positive on admission    -  completed remdesivir 2 day course  #Cultures    - 5/13: blood culture: NGTD    - 5/13: blood culture: NGTD    - 5/13: urine culture: E Coli  - ID following    ENDOCRINE:  #glycemic monitoring    -FSG q6 while NPO    -Glucose goal 140-180. If above 180, will start corrective insulin sliding scale  #hypercalcemia  -  home cinacalcet 30mg bid, must be crushed well to not clog NGT - holding while NPO     MSK:  #Activity - increase as tolerated   #hx osteoporosis  - holding home Alendronate 70mg weekly on Thursdays while NPO    SKIN:  #DTI screening negative 5/18    - will continue to monitor daily skin changes      PALLIATIVE:  Currently full code, palliative consult       LINES/DRAINS:  PIV, Ya (replaced 5/17), J tube (5/16), 8 cuffed portex trach, R radial arterial line (5/14- ), LIJ CVC (5/14- )  ADVANCED DIRECTIVES:  Full Code    HCP/Emergency Contact- daughter Cecilia [phone]  INDICATION FOR SICU/SDU:  perforated peptic ulcer; mechanical ventilation/vasopressors     DISPO:  SICU. Case to be discussed with attending Dr. Pedersen 74F found to have perforated peptic ulcer now s/p ex lap, distal antrectomy, abthera placement requiring vasopressors. RTOR 5/16 s/p second look laparotomy, Billroth II reconstruction, gastrojejunostomy tube replaced with a jejunal feeding tube, abdomen closed at the end of the case.     NEUROLOGICAL:  #Acute pain   - IV APAP while NPO   - oxycodone 2.5mg prn moderate pain, holding    - oxycodone 5mg prn severe pain, holding     RESPIRATORY:   #Respiratory failure, s/p tracheostomy (11/24)    - now with 8 portex cuffed trach (switched in OR on 5/14)    - toelrating T- piece x 24 hrs (5/17)    - AM ABG    - continue duonebs   #Decreased small bibasilar opacities/atelectasis, right greater than left on CT  - 5/16: s/p deep suctioning by anesthesia w/ copious secretions   #h/o asthma  - atrovent and albuterol   #Activity   - increase as tolerated     CARDS:   #Nonsustained Vtach - self resolved   - cards consult: management of septic shock. Start short acting AC for afib. Start beta blockers once off pressors   #hypotension 2/2 sepsis   - off levophed gtt 5/17   - Remains on vasopressin @0.03  - MAP >65  - Started midodrine 10 mg Q8h started 5/18, holding while strict NPO   #hx afib  - transiently was in AFIB slow ventricular response (HR 40s-60s), remains on low dose pressors   - holding home Xarelto 15 QD  #hx HTN  - holding home losartan 100QD  #elevated troponin  - 143 > 84   - no longer trending  #TTE 11/24: EF 71%. Mild-mod AS. Trivial pericardial effusion.   - Echo 5/15: EF 30-35%, multiple left ventricular motion wall abnormalities. Possible Takotsubo vs ischemic. Global left ventricular systolic function.     GASTROINTESTINAL/NUTRITION:   #perforated prepyloric gastric ulcer s/p exploratory laparotomy antrectomy and D1 stapled off with temporary abdominal closure and abthera in place  - 5/16: RTOR s/p bilroth 2, J tube placement, RLQ maliha drain by anastomosis extending to duodenal stump  - 5/18: BULMARO drain now w/ bilious output > pt made strict NPO, f/u drain creatinine/bilirubin composition, LFTs WNL     Liver Biochemical Testing Trend:  Aspartate Aminotransferase (AST/SGOT): 11 (05-18-25 @ 23:22)  Alanine Aminotransferase (ALT/SGPT): 9 (05-18-25 @ 23:22)  Bilirubin Direct: 0.2 (05-18-25 @ 23:22)    #Diet, STRICT NPO     - J tube, was tolerating trickle feeds > held 2/2 bilious output from BULMARO drain     - medications through NGT    - aspiration precautions, HOB 30  #GI Prophylaxis    - protonix 40mg bid IV  #Bowel regimen    -holding    -last bowel movement 5/17    /RENAL:   #urine output in critically ill    -chronic indwelling ya > new ya exchanged 5/17  #Maintain euvolemia   - LR @ 50cc/hr while NPO   #metabolic acidosis, resolved  #NOLA (baseline creatine 0.6), improving   - nephrology consulted > no RRT at this time  #oliguria  - intermittent albumin   #Hypomagnesemia - repleted  #Hypophosphatemia - repleted     Labs:          BUN/Cr- 31/1.0  -->,  31/0.9  -->          [05-18 @ 23:22]Na  139 // K  4.0 // Mg  1.9 // Phos  2.8    HEME/ONC:   #DVT prophylaxis    -HSQ    -SCDs  #anemia  - 1pRBC 5/17 to help wean off vasopressors    Labs: Hb/Hct:  8.3/25.5  -->,  7.8/24.1  -->                      Plts:  170  -->,  168  -->                 PTT/INR:        ID:  WBC- 11.98  --->>,  12.14  --->>,  12.59  --->>  Temp trend- 24hrs T(F): 96.9 (05-18 @ 20:33), Max: 98.2 (05-18 @ 04:00)  #perforated duodenal ulcer  - zosyn (renal dosing) (5/14 - 5/16)  - Meropenem (5/16 - )  - caspofungin per primary team (5/14 - )  - Nystatin powder for groin rash (5/15 - )  #COVID positive on admission    -  completed remdesivir 2 day course  #Cultures    - 5/13: blood culture: NGTD    - 5/13: blood culture: NGTD    - 5/13: urine culture: E Coli  - ID following    ENDOCRINE:  #glycemic monitoring    -FSG q6 while NPO    -Glucose goal 140-180. If above 180, will start corrective insulin sliding scale  #hypercalcemia  -  home cinacalcet 30mg bid, must be crushed well to not clog NGT - holding while NPO     MSK:  #Activity - increase as tolerated   #hx osteoporosis  - holding home Alendronate 70mg weekly on Thursdays while NPO    SKIN:  #DTI screening negative 5/18    - will continue to monitor daily skin changes      PALLIATIVE:  Currently full code, palliative consult       LINES/DRAINS:  PIV, Ya (replaced 5/17), J tube (5/16), 8 cuffed portex trach, R radial arterial line (5/14- ), LIJ CVC (5/14- )  ADVANCED DIRECTIVES:  Full Code    HCP/Emergency Contact- daughter Cecilia [phone]  INDICATION FOR SICU/SDU:  perforated peptic ulcer; mechanical ventilation/vasopressors     DISPO:  SICU. Case to be discussed with attending Dr. Pedersen 74F found to have perforated peptic ulcer now s/p ex lap, distal antrectomy, abthera placement requiring vasopressors. RTOR 5/16 s/p second look laparotomy, Billroth II reconstruction, gastrojejunostomy tube replaced with a jejunal feeding tube, abdomen closed at the end of the case.     NEUROLOGICAL:  #Acute pain   - IV APAP while NPO   - oxycodone 2.5mg prn moderate pain, holding    - oxycodone 5mg prn severe pain, holding     RESPIRATORY:   #Respiratory failure, s/p tracheostomy (11/24)    - now with 8 portex cuffed trach (switched in OR on 5/14)    - tolerating T- piece x 24 hrs (5/17)    - AM ABG    - continue duonebs   #Decreased small bibasilar opacities/atelectasis, right greater than left on CT  - 5/16: s/p deep suctioning by anesthesia w/ copious secretions   #h/o asthma  - atrovent and albuterol   #Activity   - increase as tolerated     CARDS:   #Nonsustained Vtach - self resolved   - cards consult: management of septic shock. Start short acting AC for afib. Start beta blockers once off pressors   #hypotension 2/2 sepsis   - off levophed gtt 5/17   - Remains on vasopressin @0.03  - MAP >65  - Started midodrine 10 mg Q8h started 5/18, holding while strict NPO   #hx afib  - transiently was in AFIB slow ventricular response (HR 40s-60s), remains on low dose pressors   - holding home Xarelto 15 QD  #hx HTN  - holding home losartan 100QD  #elevated troponin  - 143 > 84   - no longer trending  #TTE 11/24: EF 71%. Mild-mod AS. Trivial pericardial effusion.   - Echo 5/15: EF 30-35%, multiple left ventricular motion wall abnormalities. Possible Takotsubo vs ischemic. Global left ventricular systolic function.     GASTROINTESTINAL/NUTRITION:   #perforated prepyloric gastric ulcer s/p exploratory laparotomy antrectomy and D1 stapled off with temporary abdominal closure and abthera in place  - 5/16: RTOR s/p bilroth 2, J tube placement, RLQ maliha drain by anastomosis extending to duodenal stump  - 5/18: BULMARO drain now w/ bilious output > pt made strict NPO, f/u drain creatinine/bilirubin composition, LFTs WNL  #Diet, STRICT NPO     - J tube, was tolerating trickle feeds > held 2/2 bilious output from BULMARO drain     - medications through NGT    - aspiration precautions, HOB 30  #GI Prophylaxis    - protonix 40mg bid IV  #Bowel regimen    -holding    -last bowel movement 5/17    /RENAL:   #urine output in critically ill    -chronic indwelling ya > new ya exchanged 5/17  #Maintain euvolemia   - LR @ 50cc/hr while NPO   #metabolic acidosis, resolved  #NOLA (baseline creatine 0.6), improving   - nephrology consulted > no RRT at this time    Labs:   BUN/Cr- 31/1.0  -->,  31/0.9  -->  [05-18 @ 23:22]Na  139 // K  4.0 // Mg  1.9 // Phos  2.8  [05-18 @ 09:55]Na  140 // K  4.1 // Mg  2.0 // Phos  3.5    HEME/ONC:   #DVT prophylaxis    -HSQ    -SCDs  #anemia  - 1pRBC 5/17 to help wean off vasopressors    Labs: Hb/Hct:  8.3/25.5  (05-18 @ 00:00)  -->,  7.8/24.1  (05-18 @ 23:22)  -->                      Plts:  170  -->,  168  -->       ID:  WBC- 11.98  --->>,  12.14  --->>,  12.59  --->>  Temp trend- 24hrs T(F): 96.9 (05-18 @ 20:33), Max: 98.2 (05-18 @ 04:00)  #perforated duodenal ulcer  - zosyn (renal dosing) (5/14 - 5/16)  - Meropenem (5/16 - )  - caspofungin per primary team (5/14 - )  - Nystatin powder for groin rash (5/15 - )  #COVID positive on admission    -  completed remdesivir 2 day course  #Cultures    - 5/13: blood culture: NGTD    - 5/13: blood culture: NGTD    - 5/13: urine culture: E Coli  - ID following    ENDOCRINE:  #glycemic monitoring    -FSG q6 while NPO    -Glucose goal 140-180. If above 180, will start corrective insulin sliding scale  #hypercalcemia  -  home cinacalcet 30mg bid, must be crushed well to not clog NGT - holding while NPO     MSK:  #Activity - increase as tolerated   #hx osteoporosis  - holding home Alendronate 70mg weekly on Thursdays while NPO    SKIN:  #DTI screening negative 5/18    - will continue to monitor daily skin changes      PALLIATIVE:  Currently full code, palliative consult     LINES/DRAINS:  PIV, Ya (replaced 5/17), J tube (5/16), 8 cuffed portex trach, R radial arterial line (5/14- ), LIJ CVC (5/14- )  ADVANCED DIRECTIVES:  Full Code    HCP/Emergency Contact- daughter Cecilia [phone]  INDICATION FOR SICU/SDU:  perforated peptic ulcer; mechanical ventilation/vasopressors     DISPO:  SICU. Case to be discussed with attending Dr. Pedersen

## 2025-05-19 NOTE — PROGRESS NOTE ADULT - SUBJECTIVE AND OBJECTIVE BOX
DARCIEPATRICIA  74y, Female  Allergy: No Known Allergies      LOS  6d    CHIEF COMPLAINT: Septic (16 May 2025 12:06)      INTERVAL EVENTS/HPI  - T(F): , Max: 97.1 (05-19-25 @ 00:00) on pressors  - WBC Count: 12.59 (05-18-25 @ 23:22)  WBC Count: 12.14 (05-18-25 @ 00:00)     - Creatinine: 0.9 (05-18-25 @ 23:22)  Creatinine: 1.0 (05-18-25 @ 09:55)     -   -   -     ROS  cannot obtain secondary to patient's sedation and/or mental status    VITALS:  T(F): 96.9, Max: 97.1 (05-19-25 @ 00:00)  HR: 86  BP: 114/57  RR: 25Vital Signs Last 24 Hrs  T(C): 36.1 (19 May 2025 04:00), Max: 36.2 (19 May 2025 00:00)  T(F): 96.9 (19 May 2025 04:00), Max: 97.1 (19 May 2025 00:00)  HR: 86 (19 May 2025 12:00) (68 - 102)  BP: 114/57 (19 May 2025 12:00) (108/63 - 133/58)  BP(mean): 79 (19 May 2025 12:00) (76 - 91)  RR: 25 (19 May 2025 12:00) (4 - 25)  SpO2: 100% (19 May 2025 12:00) (100% - 100%)    Parameters below as of 19 May 2025 10:00  Patient On (Oxygen Delivery Method): T-piece  O2 Flow (L/min): 8  O2 Concentration (%): 40    PHYSICAL EXAM:  Gen: trach/ vent, chronically ill appearing  HEENT: NCAT  CV: RRR  Lungs: Decreased BS at bases  Abd: Soft, dressings  Neuro: does not follow commands  Skin: no rash   Extremities: warm  Lines: clean, no phlebitis     FH: Non-contributory  Social Hx: Non-contributory    TESTS & MEASUREMENTS:                        7.8    12.59 )-----------( 168      ( 18 May 2025 23:22 )             24.1     05-18    139  |  109  |  31[H]  ----------------------------<  90  4.0   |  22  |  0.9    Ca    10.9[H]      18 May 2025 23:22  Phos  2.8     05-18  Mg     1.9     05-18    TPro  4.2[L]  /  Alb  2.4[L]  /  TBili  0.4  /  DBili  0.2  /  AST  11  /  ALT  9   /  AlkPhos  82  05-18      LIVER FUNCTIONS - ( 18 May 2025 23:22 )  Alb: 2.4 g/dL / Pro: 4.2 g/dL / ALK PHOS: 82 U/L / ALT: 9 U/L / AST: 11 U/L / GGT: x           Urinalysis Basic - ( 18 May 2025 23:22 )    Color: x / Appearance: x / SG: x / pH: x  Gluc: 90 mg/dL / Ketone: x  / Bili: x / Urobili: x   Blood: x / Protein: x / Nitrite: x   Leuk Esterase: x / RBC: x / WBC x   Sq Epi: x / Non Sq Epi: x / Bacteria: x        Urinalysis with Rflx Culture (collected 05-13-25 @ 17:22)    Culture - Urine (collected 05-13-25 @ 17:22)  Source: Catheterized None  Final Report (05-16-25 @ 10:50):    >100,000 CFU/ml Escherichia coli ESBL  Organism: Escherichia coli ESBL (05-16-25 @ 10:50)  Organism: Escherichia coli ESBL (05-16-25 @ 10:50)      -  Levofloxacin: R >4      -  Tobramycin: S <=2      -  Nitrofurantoin: S <=32 Should not be used to treat pyelonephritis      -  Aztreonam: R 8      -  Gentamicin: S <=2      -  Cefazolin: R >16 For uncomplicated UTI with K. pneumoniae, E. coli, or P. mirablis: NAGI <=16 is sensitive and NAGI >=32 is resistant. This also predicts results for oral agents cefaclor, cefdinir, cefpodoxime, cefprozil, cefuroxime axetil, cephalexin and locarbef for uncomplicated UTI. Note that some isolates may be susceptible to these agents while testing resistant to cefazolin.      -  Cefepime: R >16      -  Piperacillin/Tazobactam: S <=8      -  Ciprofloxacin: R >2      -  Imipenem: S <=1      -  Ceftriaxone: R >32      -  Ampicillin: R >16 These ampicillin results predict results for amoxicillin      Method Type: NAGI      -  Meropenem: S <=1      -  Ampicillin/Sulbactam: S <=4/2      -  Cefuroxime: R >16      -  Trimethoprim/Sulfamethoxazole: R >2/38      -  Tigecycline: S <=2 Interpretations based on FDA breakpoints      -  Ertapenem: S <=0.5    Culture - Blood (collected 05-13-25 @ 15:45)  Source: Blood Blood-Peripheral  Final Report (05-18-25 @ 23:00):    No growth at 5 days    Culture - Blood (collected 05-13-25 @ 15:35)  Source: Blood Blood-Peripheral  Final Report (05-18-25 @ 23:00):    No growth at 5 days        Blood Gas Venous - Lactate: 1.2 mmol/L (05-16-25 @ 06:00)  Blood Gas Venous - Lactate: 1.2 mmol/L (05-15-25 @ 18:02)      INFECTIOUS DISEASES TESTING  MRSA PCR Result.: Negative (05-14-25 @ 07:20)  MRSA PCR Result.: Negative (04-14-25 @ 17:16)  Procalcitonin: 5.22 (11-05-24 @ 19:32)  MRSA PCR Result.: Negative (11-04-24 @ 13:15)      INFLAMMATORY MARKERS  Sedimentation Rate, Erythrocyte: 45 mm/hr (05-14-25 @ 22:15)  C-Reactive Protein: 154.6 mg/L (05-14-25 @ 22:15)      RADIOLOGY & ADDITIONAL TESTS:  I have personally reviewed the last available Chest xray  CXR  Xray Chest 1 View- PORTABLE-Urgent:   ACC: [id number] EXAM:  XR CHEST PORTABLE URGENT 1V   ORDERED BY: HOLLY RUTHERFORD     PROCEDURE DATE:  05/16/2025          INTERPRETATION:  CLINICAL HISTORY: Postoperative evaluation    COMPARISON: Chest radiograph 5/16/2025 2:29 PM.    TECHNIQUE: Portable frontal chest radiograph.    FINDINGS:    Support devices: Endotracheal tube coursing below the diaphragm. Right IJ   central venous catheter with tip in the superior vena cava.    Cardiac/mediastinum/hilum: Unchanged.    Lung parenchyma/Pleura: Unchanged right basilar opacities/effusion. No   pneumothorax    Skeleton/soft tissues: Unchanged.      IMPRESSION:    Unchanged right basilar opacities/effusion.    --- End of Report ---          DOUGLAS DOMINIQUE MD; Resident Radiologist  This documenthas been electronically signed.  RAYSA SEGURA MD; Attending Radiologist  This document has been electronically signed. May 17 2025  2:45PM (05-16-25 @ 19:18)      CT      CARDIOLOGY TESTING  12 Lead ECG:   Ventricular Rate 83 BPM    QRS Duration 104 ms    Q-T Interval 380 ms    QTC Calculation(Bazett) 446 ms    R Axis -22 degrees    T Axis 28 degrees    Diagnosis Line Atrial fibrillation  Low voltage QRS  Septal infarct , age undetermined  Abnormal ECG    Confirmed by Sanjeev Pierce ([id number]) on 5/14/2025 6:59:31 PM (05-13-25 @ 23:32)  12 Lead ECG:   Ventricular Rate 82 BPM    QRS Duration 104 ms    Q-T Interval 396 ms    QTC Calculation(Bazett) 462 ms    R Axis 144 degrees    T Axis 33 degrees    Diagnosis Line Atrial fibrillation  Right axis deviation  Low voltage QRS  Abnormal ECG    Confirmed by Sanjeev Pierce ([id number]) on 5/14/2025 6:56:22 PM (05-13-25 @ 17:11)      MEDICATIONS  albuterol    90 MICROgram(s) HFA Inhaler 2  caspofungin IVPB 50  caspofungin IVPB   chlorhexidine 0.12% Liquid 15  chlorhexidine 2% Cloths 1  enoxaparin Injectable 40  iohexol 300 mG (iodine)/mL Oral Solution 30  ipratropium 17 MICROgram(s) HFA Inhaler 1  lactated ringers. 1000  meropenem  IVPB 1000  nystatin Powder 1  pantoprazole  Injectable 40  vasopressin Infusion. 0.03      WEIGHT  Weight (kg): 87 (05-16-25 @ 15:13)  Creatinine: 0.9 mg/dL (05-18-25 @ 23:22)      ANTIBIOTICS:  caspofungin IVPB 50 milliGRAM(s) IV Intermittent every 24 hours  caspofungin IVPB      meropenem  IVPB 1000 milliGRAM(s) IV Intermittent every 8 hours      All available historical records have been reviewed

## 2025-05-19 NOTE — PROGRESS NOTE ADULT - NS ATTEND AMEND GEN_ALL_CORE FT
ACS Attending  Note Attestation    Patient is examined and evaluated at the bedside with the residents/PAs. Treatment plan discussed with the team, nurses, and consulting physicians and consulting teams. Medications, radiological studies and all other relevant studies reviewed. Time devoted to teaching and to any procedures I billed separately is not included    Tj Lazaro is a 74y old Female POD 5 s/p exlap  and second look laparotomy with billroth II   and replacement of GJ.    Physical Exam:  General: NAD, AAOx3, calm and cooperative  HEENT: NCAT, MAYCO, EOMI, Trachea ML, Neck supple  Cardiac: RRR S1, S2, no Murmurs, rubs or gallops  Respiratory: CTAB, trach on T piece   Abdomen: Soft, non-distended, non-tender  Vascular: Pulses 2+ throughout, extremities well perfused  Skin: Warm/dry, normal color, no jaundice  Incision/wound: healing well, midline incision    Vitals:   T(F): 96.9 (05-19-25 @ 04:00), Max: 97.1 (05-19-25 @ 00:00)  HR: 98 (05-19-25 @ 08:00)  BP: 109/71 (05-19-25 @ 08:00)  RR: 24 (05-19-25 @ 08:00)  SpO2: 100% (05-19-25 @ 08:27)      Plan:  - SICU management   - wean pressors as tolerated   - saturating well on T piece   - BULMARO drain with serobilious output (new from previous) will get CT C/A/P today to eval for leak   - pain control   - monitor output   - NPO for now. holding feeds. J tube clamped    [x ]  45  minutes spent on total encounter. The necessity of the time above spent during the encounter on this date of service as described above.    Adolfo Jefferson MD  Trauma/ACS/Surgical Critical Care Attending

## 2025-05-19 NOTE — PROGRESS NOTE ADULT - SUBJECTIVE AND OBJECTIVE BOX
GENERAL SURGERY PROGRESS NOTE    Patient: PATRICIA SPRAGUE , 74y ([birth year])Female   MRN: [id number]  Location: [place] [address] A  Visit: 05-13-25 Inpatient  Date: 05-19-25 @ 09:36    Hospital Day #: 7  Post-Op Day #: 5    Procedure/Dx/Injuries: perf duodenal ulcer s/p ex lap, antrectomy, bilroth II and GJ tube placement     Events of past 24 hours: drain began to have serobilious output. concern for leak. otherwise stable on 1 pressor. now strict NPO. T piece     PAST MEDICAL & SURGICAL HISTORY:  HTN (hypertension)  Diabetes mellitus  Obesity  Gastroesophageal reflux disease  Active asthma  Generalized OA  Afib  H/O hernia repair  2011 and revised in 2013  History of cholecystectomy  Status post debridement  H/O tracheostomy  S/P gastrostomy  S/P jejunostomy      Vitals:   T(F): 96.9 (05-19-25 @ 04:00), Max: 97.1 (05-19-25 @ 00:00)  HR: 98 (05-19-25 @ 08:00)  BP: 109/71 (05-19-25 @ 08:00)  RR: 24 (05-19-25 @ 08:00)  SpO2: 100% (05-19-25 @ 08:27)  Mode: standby    Diet, NPO      Fluids: lactated ringers.: Solution, 1000 milliLiter(s) infuse at 50 mL/Hr      I & O's:    05-18-25 @ 07:01  -  05-19-25 @ 07:00  --------------------------------------------------------  IN:    Glucerna: 370 mL    IV PiggyBack: 250 mL    IV PiggyBack: 350 mL    IV PiggyBack: 250 mL    IV PiggyBack: 100 mL    IV PiggyBack: 150 mL    Lactated Ringers: 400 mL    Vasopressin (Organ Donation): 108 mL  Total IN: 1978 mL    OUT:    Bulb (mL): 320 mL    Nasogastric/Oral tube (mL): 400 mL    Ureteral Catheter (mL): 630 mL  Total OUT: 1350 mL    Total NET: 628 mL      PHYSICAL EXAM:  General: NAD, AAOx3, calm and cooperative  HEENT: NCAT, MAYCO, EOMI, Trachea ML, Neck supple  Cardiac: RRR S1, S2, no Murmurs, rubs or gallops  Respiratory: CTAB, trach on T piece   Abdomen: Soft, non-distended, non-tender  Vascular: Pulses 2+ throughout, extremities well perfused  Skin: Warm/dry, normal color, no jaundice  Incision/wound: healing well, midline incision    MEDICATIONS  (STANDING):  albuterol    90 MICROgram(s) HFA Inhaler 2 Puff(s) Inhalation every 6 hours  caspofungin IVPB 50 milliGRAM(s) IV Intermittent every 24 hours  caspofungin IVPB      chlorhexidine 0.12% Liquid 15 milliLiter(s) Oral Mucosa every 12 hours  chlorhexidine 2% Cloths 1 Application(s) Topical <User Schedule>  enoxaparin Injectable 40 milliGRAM(s) SubCutaneous every 24 hours  iohexol 300 mG (iodine)/mL Oral Solution 30 milliLiter(s) Oral once  ipratropium 17 MICROgram(s) HFA Inhaler 1 Puff(s) Inhalation every 6 hours  lactated ringers. 1000 milliLiter(s) (50 mL/Hr) IV Continuous <Continuous>  meropenem  IVPB 1000 milliGRAM(s) IV Intermittent every 12 hours  meropenem  IVPB      meropenem  IVPB 1000 milliGRAM(s) IV Intermittent every 8 hours  nystatin Powder 1 Application(s) Topical two times a day  pantoprazole  Injectable 40 milliGRAM(s) IV Push every 12 hours  vasopressin Infusion. 0.03 Unit(s)/Min (4.5 mL/Hr) IV Continuous <Continuous>    MEDICATIONS  (PRN):  HYDROmorphone  Injectable 0.25 milliGRAM(s) IV Push every 6 hours PRN Severe Pain (7 - 10)  sodium chloride 0.9% lock flush 10 milliLiter(s) IV Push every 1 hour PRN Pre/post blood products, medications, blood draw, and to maintain line patency      DVT PROPHYLAXIS: enoxaparin Injectable 40 milliGRAM(s) SubCutaneous every 24 hours    GI PROPHYLAXIS: pantoprazole  Injectable 40 milliGRAM(s) IV Push every 12 hours    ANTICOAGULATION:   ANTIBIOTICS:  caspofungin IVPB 50 milliGRAM(s)  caspofungin IVPB    meropenem  IVPB 1000 milliGRAM(s)  meropenem  IVPB    meropenem  IVPB 1000 milliGRAM(s)      LAB/STUDIES:  Labs:  CAPILLARY BLOOD GLUCOSE      POCT Blood Glucose.: 106 mg/dL (19 May 2025 05:49)  POCT Blood Glucose.: 100 mg/dL (18 May 2025 23:20)                          7.8    12.59 )-----------( 168      ( 18 May 2025 23:22 )             24.1       Auto Immature Granulocyte %: 0.6 % (05-18-25 @ 23:22)    05-18    139  |  109  |  31[H]  ----------------------------<  90  4.0   |  22  |  0.9      Calcium: 10.9 mg/dL (05-18-25 @ 23:22)      LFTs:             4.2  | 0.4  | 11       ------------------[82      ( 18 May 2025 23:22 )  2.4  | 0.2  | 9           Lipase:x      Amylase:x         Blood Gas Arterial, Lactate: 0.7 mmol/L (05-19-25 @ 04:13)  Blood Gas Arterial, Lactate: 0.8 mmol/L (05-18-25 @ 04:12)  Blood Gas Arterial, Lactate: 1.0 mmol/L (05-17-25 @ 08:44)  Blood Gas Arterial, Lactate: 1.1 mmol/L (05-17-25 @ 03:59)  Blood Gas Arterial, Lactate: 1.0 mmol/L (05-16-25 @ 20:56)  Blood Gas Arterial, Lactate: 1.0 mmol/L (05-16-25 @ 14:48)    ABG - ( 19 May 2025 04:13 )  pH: 7.39  /  pCO2: 38    /  pO2: 101   / HCO3: 23    / Base Excess: -1.8  /  SaO2: 98.0        ABG - ( 18 May 2025 04:12 )  pH: 7.42  /  pCO2: 33    /  pO2: 88    / HCO3: 21    / Base Excess: -2.6  /  SaO2: 98.1        ABG - ( 17 May 2025 08:44 )  pH: 7.44  /  pCO2: 31    /  pO2: 158   / HCO3: 21    / Base Excess: -2.6  /  SaO2: 99.1        Coags:      Urinalysis Basic - ( 18 May 2025 23:22 )    Color: x / Appearance: x / SG: x / pH: x  Gluc: 90 mg/dL / Ketone: x  / Bili: x / Urobili: x   Blood: x / Protein: x / Nitrite: x   Leuk Esterase: x / RBC: x / WBC x   Sq Epi: x / Non Sq Epi: x / Bacteria:       IMAGING:

## 2025-05-19 NOTE — PHARMACOTHERAPY INTERVENTION NOTE - NSPHARMCOMMASP
ASP - Renal dose adjustment
ASP - Therapy recommended/ Alternative therapy
ASP - Therapy recommended/ Alternative therapy

## 2025-05-19 NOTE — PROGRESS NOTE ADULT - SUBJECTIVE AND OBJECTIVE BOX
PATRICIA SPRAGUE   [id number]   [birth year]    74yF  ============================================================   DATE OF INITIAL SICU/SDU CONSULT: 05-13-25    INDICATION FOR SICU CONSULT:  perforated duodenal ulcer      SICU COURSE EVENTS :  05-13 - admitted to SICU service  05-14 - s/p OR for exploratory laparotomy, started remdesivir for covid +, echo ordered  5/15 - Echo showing EF 30-35%, cardiology consulted. Pending RTOR. Remains on vasopressors, weaning.  5/16: S/p Exploratory laparotomy, bilroth 2, J tube placement. Episode of wheezing. Changed to meropenem per ID. AFIB slow ventricular response HR 40s-60s, remains on low dose pressors   5/17: Tolerating T piece, trickle feed started via J tube, s/p 1u PRBC to wean off levophed, remains on vasopressin, IVL  5/18: Midodrine 10mg q8 started, weaned off levophed. BULMARO drain bilious output > strict NPO, f/u fluid composition     24HOUR EVENTS  5/18  NIGHT  - PM rounds: HR 90s,  on vaso @ 0.03, saturating well on T-Piece 40%, abdomen soft, tender, BULMARO drain dark yellow-green, NGT, TF @ 25 via J tube,   - BULMARO drain w/ bilious output, strict NPO, f/u drain fluid creatinine, LFTS  - 23:30 labs   - AM CXR/ABG   - likely repeat CTAP 5/19    Day  - started midodrine 10 q8    [X] A ten-point review of systems was negative except as expressed in note.  [X] History was obtained from patient. If unable to participate in their care, history obtained from review of the chart and collateral sources of information.  ============================================================      PATRICIA SPRAGUE   [id number]   [birth year]    74yF  ============================================================   DATE OF INITIAL SICU/SDU CONSULT: 25    INDICATION FOR SICU CONSULT:  perforated duodenal ulcer      SICU COURSE EVENTS :   - admitted to SICU service   - s/p OR for exploratory laparotomy, started remdesivir for covid +, echo ordered  5/15 - Echo showing EF 30-35%, cardiology consulted. Pending RTOR. Remains on vasopressors, weaning.  : S/p Exploratory laparotomy, bilroth 2, J tube placement. Episode of wheezing. Changed to meropenem per ID. AFIB slow ventricular response HR 40s-60s, remains on low dose pressors   : Tolerating T piece, trickle feed started via J tube, s/p 1u PRBC to wean off levophed, remains on vasopressin, IVL  : Midodrine 10mg q8 started, weaned off levophed. BULMARO drain bilious output > strict NPO, f/u fluid composition     24HOUR EVENTS    NIGHT  - PM rounds: HR 90s,  on vaso @ 0.03, saturating well on T-Piece 40%, abdomen soft, tender, BULMARO drain dark yellow-green, NGT, TF @ 25 via J tube,   - BULMARO drain w/ bilious output, strict NPO, f/u drain fluid creatinine, LFTS  - 23:30 labs   - AM CXR/ABG   - likely repeat CTAP     Day  - started midodrine 10 q8    [X] A ten-point review of systems was negative except as expressed in note.  [X] History was obtained from patient. If unable to participate in their care, history obtained from review of the chart and collateral sources of information.  ============================================================   Daily     Daily Weight in k.8 (19 May 2025 06:00)    Diet, NPO (25 @ 22:48)      CURRENT MEDS:  Neurologic Medications  acetaminophen   IVPB .. 1000 milliGRAM(s) IV Intermittent once    Respiratory Medications  albuterol    90 MICROgram(s) HFA Inhaler 2 Puff(s) Inhalation every 6 hours  ipratropium 17 MICROgram(s) HFA Inhaler 1 Puff(s) Inhalation every 6 hours    Cardiovascular Medications    Gastrointestinal Medications  lactated ringers. 1000 milliLiter(s) IV Continuous <Continuous>  pantoprazole  Injectable 40 milliGRAM(s) IV Push every 12 hours  sodium chloride 0.9% lock flush 10 milliLiter(s) IV Push every 1 hour PRN Pre/post blood products, medications, blood draw, and to maintain line patency    Genitourinary Medications    Hematologic/Oncologic Medications  heparin   Injectable 5000 Unit(s) SubCutaneous every 8 hours    Antimicrobial/Immunologic Medications  caspofungin IVPB 50 milliGRAM(s) IV Intermittent every 24 hours  caspofungin IVPB      meropenem  IVPB 1000 milliGRAM(s) IV Intermittent every 12 hours  meropenem  IVPB        Endocrine/Metabolic Medications  vasopressin Infusion. 0.03 Unit(s)/Min IV Continuous <Continuous>    Topical/Other Medications  chlorhexidine 0.12% Liquid 15 milliLiter(s) Oral Mucosa every 12 hours  chlorhexidine 2% Cloths 1 Application(s) Topical <User Schedule>  nystatin Powder 1 Application(s) Topical two times a day      ICU Vital Signs Last 24 Hrs  T(C): 36.1 (19 May 2025 04:00), Max: 36.2 (19 May 2025 00:00)  T(F): 96.9 (19 May 2025 04:00), Max: 97.1 (19 May 2025 00:00)  HR: 102 (19 May 2025 06:00) (71 - 102)  BP: 117/70 (19 May 2025 06:00) (103/70 - 133/58)  BP(mean): 88 (19 May 2025 06:00) (76 - 91)  ABP: 125/65 (19 May 2025 06:00) (112/51 - 140/73)  ABP(mean): 86 (19 May 2025 06:00) (70 - 122)  RR: 23 (19 May 2025 06:00) (4 - 36)  SpO2: 100% (19 May 2025 06:00) (92% - 100%)    O2 Parameters below as of 19 May 2025 01:00  Patient On (Oxygen Delivery Method): T-piece    Mode: standby    ABG - ( 19 May 2025 04:13 )  pH, Arterial: 7.39  pH, Blood: x     /  pCO2: 38    /  pO2: 101   / HCO3: 23    / Base Excess: -1.8  /  SaO2: 98.0        I&O's Summary    18 May 2025 07:01  -  19 May 2025 07:00  --------------------------------------------------------  IN: 1923.5 mL / OUT: 1350 mL / NET: 573.5 mL      I&O's Detail    18 May 2025 07:01  -  19 May 2025 07:00  --------------------------------------------------------  IN:    Glucerna: 370 mL    IV PiggyBack: 250 mL    IV PiggyBack: 350 mL    IV PiggyBack: 250 mL    IV PiggyBack: 100 mL    IV PiggyBack: 150 mL    Lactated Ringers: 350 mL    Vasopressin (Organ Donation): 103.5 mL  Total IN: 1923.5 mL    OUT:    Bulb (mL): 320 mL    Nasogastric/Oral tube (mL): 400 mL    Ureteral Catheter (mL): 630 mL  Total OUT: 1350 mL    Total NET: 573.5 mL    PHYSICAL EXAM:    General/Neuro: GCS 11T, Following commands  Lungs: Normal expansion/effort. Trach to T-piece  Cardiovascular : S1, S2.  Regular rate and rhythm  GI: Abdomen soft, Non-tender, Non-distended. Midline vac holding suction. LLQ BULMARO in place with yellow/green output.   Extremities: Extremities warm, pink, well-perfused. SEPULVEDA.  Derm: Good skin turgor, no skin breakdown.    :  Burger catheter in place.    LABS:  CAPILLARY BLOOD GLUCOSE      POCT Blood Glucose.: 106 mg/dL (19 May 2025 05:49)  POCT Blood Glucose.: 100 mg/dL (18 May 2025 23:20)                          7.8    12.59 )-----------( 168      ( 18 May 2025 23:22 )             24.1       18    139  |  109  |  31[H]  ----------------------------<  90  4.0   |  22  |  0.9    Ca    10.9[H]      18 May 2025 23:22  Phos  2.8       Mg     1.9         TPro  4.2[L]  /  Alb  2.4[L]  /  TBili  0.4  /  DBili  0.2  /  AST  11  /  ALT  9   /  AlkPhos  82  -18        Urinalysis Basic - ( 18 May 2025 23:22 )    Color: x / Appearance: x / SG: x / pH: x  Gluc: 90 mg/dL / Ketone: x  / Bili: x / Urobili: x   Blood: x / Protein: x / Nitrite: x   Leuk Esterase: x / RBC: x / WBC x   Sq Epi: x / Non Sq Epi: x / Bacteria: x

## 2025-05-20 LAB
ANION GAP SERPL CALC-SCNC: 11 MMOL/L — SIGNIFICANT CHANGE UP (ref 7–14)
BUN SERPL-MCNC: 23 MG/DL — HIGH (ref 10–20)
CALCIUM SERPL-MCNC: 10 MG/DL — SIGNIFICANT CHANGE UP (ref 8.4–10.5)
CHLORIDE SERPL-SCNC: 111 MMOL/L — HIGH (ref 98–110)
CO2 SERPL-SCNC: 24 MMOL/L — SIGNIFICANT CHANGE UP (ref 17–32)
CREAT SERPL-MCNC: 0.9 MG/DL — SIGNIFICANT CHANGE UP (ref 0.7–1.5)
EGFR: 68 ML/MIN/1.73M2 — SIGNIFICANT CHANGE UP
EGFR: 68 ML/MIN/1.73M2 — SIGNIFICANT CHANGE UP
GLUCOSE SERPL-MCNC: 123 MG/DL — HIGH (ref 70–99)
HCT VFR BLD CALC: 23.6 % — LOW (ref 37–47)
HCT VFR BLD CALC: 24.3 % — LOW (ref 37–47)
HGB BLD-MCNC: 7.5 G/DL — LOW (ref 12–16)
HGB BLD-MCNC: 7.9 G/DL — LOW (ref 12–16)
MAGNESIUM SERPL-MCNC: 2.2 MG/DL — SIGNIFICANT CHANGE UP (ref 1.8–2.4)
MCHC RBC-ENTMCNC: 27.7 PG — SIGNIFICANT CHANGE UP (ref 27–31)
MCHC RBC-ENTMCNC: 29.4 PG — SIGNIFICANT CHANGE UP (ref 27–31)
MCHC RBC-ENTMCNC: 30.9 G/DL — LOW (ref 32–37)
MCHC RBC-ENTMCNC: 33.5 G/DL — SIGNIFICANT CHANGE UP (ref 32–37)
MCV RBC AUTO: 87.7 FL — SIGNIFICANT CHANGE UP (ref 81–99)
MCV RBC AUTO: 89.7 FL — SIGNIFICANT CHANGE UP (ref 81–99)
NRBC BLD AUTO-RTO: 0 /100 WBCS — SIGNIFICANT CHANGE UP (ref 0–0)
NRBC BLD AUTO-RTO: 0 /100 WBCS — SIGNIFICANT CHANGE UP (ref 0–0)
PHOSPHATE SERPL-MCNC: 2.9 MG/DL — SIGNIFICANT CHANGE UP (ref 2.1–4.9)
PLATELET # BLD AUTO: 200 K/UL — SIGNIFICANT CHANGE UP (ref 130–400)
PLATELET # BLD AUTO: 207 K/UL — SIGNIFICANT CHANGE UP (ref 130–400)
PMV BLD: 10.3 FL — SIGNIFICANT CHANGE UP (ref 7.4–10.4)
PMV BLD: 10.6 FL — HIGH (ref 7.4–10.4)
POTASSIUM SERPL-MCNC: 3.6 MMOL/L — SIGNIFICANT CHANGE UP (ref 3.5–5)
POTASSIUM SERPL-SCNC: 3.6 MMOL/L — SIGNIFICANT CHANGE UP (ref 3.5–5)
RBC # BLD: 2.69 M/UL — LOW (ref 4.2–5.4)
RBC # BLD: 2.71 M/UL — LOW (ref 4.2–5.4)
RBC # FLD: 16.7 % — HIGH (ref 11.5–14.5)
RBC # FLD: 16.8 % — HIGH (ref 11.5–14.5)
SODIUM SERPL-SCNC: 146 MMOL/L — SIGNIFICANT CHANGE UP (ref 135–146)
SURGICAL PATHOLOGY STUDY: SIGNIFICANT CHANGE UP
WBC # BLD: 10.58 K/UL — SIGNIFICANT CHANGE UP (ref 4.8–10.8)
WBC # BLD: 9.03 K/UL — SIGNIFICANT CHANGE UP (ref 4.8–10.8)
WBC # FLD AUTO: 10.58 K/UL — SIGNIFICANT CHANGE UP (ref 4.8–10.8)
WBC # FLD AUTO: 9.03 K/UL — SIGNIFICANT CHANGE UP (ref 4.8–10.8)

## 2025-05-20 PROCEDURE — 71045 X-RAY EXAM CHEST 1 VIEW: CPT | Mod: 26

## 2025-05-20 PROCEDURE — 99291 CRITICAL CARE FIRST HOUR: CPT | Mod: 24,25

## 2025-05-20 RX ORDER — POTASSIUM PHOSPHATE, MONOBASIC POTASSIUM PHOSPHATE, DIBASIC INJECTION, 236; 224 MG/ML; MG/ML
15 SOLUTION, CONCENTRATE INTRAVENOUS ONCE
Refills: 0 | Status: COMPLETED | OUTPATIENT
Start: 2025-05-20 | End: 2025-05-20

## 2025-05-20 RX ORDER — MIDODRINE HYDROCHLORIDE 5 MG/1
10 TABLET ORAL EVERY 8 HOURS
Refills: 0 | Status: DISCONTINUED | OUTPATIENT
Start: 2025-05-20 | End: 2025-05-20

## 2025-05-20 RX ORDER — FLUCONAZOLE 150 MG
200 TABLET ORAL EVERY 24 HOURS
Refills: 0 | Status: DISCONTINUED | OUTPATIENT
Start: 2025-05-21 | End: 2025-05-24

## 2025-05-20 RX ORDER — SODIUM CHLORIDE 9 G/1000ML
1000 INJECTION, SOLUTION INTRAVENOUS
Refills: 0 | Status: DISCONTINUED | OUTPATIENT
Start: 2025-05-20 | End: 2025-05-23

## 2025-05-20 RX ORDER — FLUCONAZOLE 150 MG
400 TABLET ORAL ONCE
Refills: 0 | Status: COMPLETED | OUTPATIENT
Start: 2025-05-20 | End: 2025-05-21

## 2025-05-20 RX ORDER — ACETAMINOPHEN 500 MG/5ML
1000 LIQUID (ML) ORAL ONCE
Refills: 0 | Status: DISCONTINUED | OUTPATIENT
Start: 2025-05-20 | End: 2025-05-29

## 2025-05-20 RX ORDER — DEXTROSE 50 % IN WATER 50 %
25 SYRINGE (ML) INTRAVENOUS ONCE
Refills: 0 | Status: COMPLETED | OUTPATIENT
Start: 2025-05-20 | End: 2025-05-20

## 2025-05-20 RX ORDER — FUROSEMIDE 10 MG/ML
20 INJECTION INTRAMUSCULAR; INTRAVENOUS ONCE
Refills: 0 | Status: COMPLETED | OUTPATIENT
Start: 2025-05-20 | End: 2025-05-20

## 2025-05-20 RX ADMIN — Medication 40 MILLIGRAM(S): at 17:56

## 2025-05-20 RX ADMIN — FUROSEMIDE 20 MILLIGRAM(S): 10 INJECTION INTRAMUSCULAR; INTRAVENOUS at 10:42

## 2025-05-20 RX ADMIN — Medication 15 MILLILITER(S): at 17:56

## 2025-05-20 RX ADMIN — MEROPENEM 100 MILLIGRAM(S): 1 INJECTION INTRAVENOUS at 14:12

## 2025-05-20 RX ADMIN — MEROPENEM 100 MILLIGRAM(S): 1 INJECTION INTRAVENOUS at 22:50

## 2025-05-20 RX ADMIN — Medication 25 MILLILITER(S): at 19:54

## 2025-05-20 RX ADMIN — Medication 2 PUFF(S): at 07:48

## 2025-05-20 RX ADMIN — Medication 100 MILLIGRAM(S): at 23:50

## 2025-05-20 RX ADMIN — NYSTATIN 1 APPLICATION(S): 100000 CREAM TOPICAL at 17:57

## 2025-05-20 RX ADMIN — Medication 15 MILLILITER(S): at 05:14

## 2025-05-20 RX ADMIN — Medication 2 PUFF(S): at 01:59

## 2025-05-20 RX ADMIN — Medication 2 PUFF(S): at 20:08

## 2025-05-20 RX ADMIN — NYSTATIN 1 APPLICATION(S): 100000 CREAM TOPICAL at 05:18

## 2025-05-20 RX ADMIN — Medication 0.25 MILLIGRAM(S): at 09:05

## 2025-05-20 RX ADMIN — SODIUM CHLORIDE 50 MILLILITER(S): 9 INJECTION, SOLUTION INTRAVENOUS at 20:59

## 2025-05-20 RX ADMIN — Medication 1 PUFF(S): at 01:59

## 2025-05-20 RX ADMIN — Medication 1 PUFF(S): at 07:48

## 2025-05-20 RX ADMIN — Medication 1 PUFF(S): at 14:30

## 2025-05-20 RX ADMIN — POTASSIUM PHOSPHATE, MONOBASIC POTASSIUM PHOSPHATE, DIBASIC INJECTION, 63.75 MILLIMOLE(S): 236; 224 SOLUTION, CONCENTRATE INTRAVENOUS at 00:07

## 2025-05-20 RX ADMIN — Medication 40 MILLIGRAM(S): at 05:14

## 2025-05-20 RX ADMIN — Medication 2 PUFF(S): at 14:30

## 2025-05-20 RX ADMIN — ENOXAPARIN SODIUM 40 MILLIGRAM(S): 100 INJECTION SUBCUTANEOUS at 10:43

## 2025-05-20 RX ADMIN — Medication 1 PUFF(S): at 20:10

## 2025-05-20 RX ADMIN — Medication 0.25 MILLIGRAM(S): at 09:30

## 2025-05-20 RX ADMIN — POTASSIUM PHOSPHATE, MONOBASIC POTASSIUM PHOSPHATE, DIBASIC INJECTION, 63.75 MILLIMOLE(S): 236; 224 SOLUTION, CONCENTRATE INTRAVENOUS at 22:43

## 2025-05-20 RX ADMIN — MEROPENEM 100 MILLIGRAM(S): 1 INJECTION INTRAVENOUS at 05:14

## 2025-05-20 RX ADMIN — Medication 1 APPLICATION(S): at 05:14

## 2025-05-20 NOTE — PROGRESS NOTE ADULT - SUBJECTIVE AND OBJECTIVE BOX
DARCIEPATRICIA  74y, Female  Allergy: No Known Allergies      LOS  7d    CHIEF COMPLAINT: Septic (16 May 2025 12:06)      INTERVAL EVENTS/HPI  - T(F): , Max: 98.7 (05-19-25 @ 16:00), CT noted  - WBC Count: 10.58 (05-20-25 @ 06:50)  WBC Count: 9.64 (05-19-25 @ 21:40)     - Creatinine: 0.9 (05-19-25 @ 21:40)  Creatinine: 0.9 (05-18-25 @ 23:22)     -   -   -     ROS  cannot obtain secondary to patient's sedation and/or mental status    VITALS:  T(F): 96.9, Max: 98.7 (05-19-25 @ 16:00)  HR: 69  BP: 107/59  RR: 21Vital Signs Last 24 Hrs  T(C): 36.1 (20 May 2025 08:00), Max: 37.1 (19 May 2025 16:00)  T(F): 96.9 (20 May 2025 08:00), Max: 98.7 (19 May 2025 16:00)  HR: 69 (20 May 2025 12:00) (65 - 97)  BP: 107/59 (20 May 2025 12:00) (99/54 - 121/65)  BP(mean): 78 (20 May 2025 12:00) (71 - 87)  RR: 21 (20 May 2025 12:00) (17 - 34)  SpO2: 100% (20 May 2025 12:00) (84% - 100%)    Parameters below as of 20 May 2025 12:00  Patient On (Oxygen Delivery Method): T-piece        PHYSICAL EXAM:  Gen: trach/ vent, chronically ill appearing  HEENT: NCAT  CV: RRR  Lungs: Decreased BS at bases  Abd: Soft, dressings, vac, BULMARO bilious   Neuro: does not follow commands  Skin: no rash   Extremities: warm  Lines: clean, no phlebitis     FH: Non-contributory  Social Hx: Non-contributory    TESTS & MEASUREMENTS:                        7.9    10.58 )-----------( 207      ( 20 May 2025 06:50 )             23.6     05-19    140  |  108  |  29[H]  ----------------------------<  75  3.9   |  21  |  0.9    Ca    10.4      19 May 2025 21:40  Phos  2.8     05-19  Mg     1.9     05-19    TPro  4.2[L]  /  Alb  2.4[L]  /  TBili  0.4  /  DBili  0.2  /  AST  11  /  ALT  9   /  AlkPhos  82  05-18      LIVER FUNCTIONS - ( 18 May 2025 23:22 )  Alb: 2.4 g/dL / Pro: 4.2 g/dL / ALK PHOS: 82 U/L / ALT: 9 U/L / AST: 11 U/L / GGT: x           Urinalysis Basic - ( 19 May 2025 21:40 )    Color: x / Appearance: x / SG: x / pH: x  Gluc: 75 mg/dL / Ketone: x  / Bili: x / Urobili: x   Blood: x / Protein: x / Nitrite: x   Leuk Esterase: x / RBC: x / WBC x   Sq Epi: x / Non Sq Epi: x / Bacteria: x        Urinalysis with Rflx Culture (collected 05-13-25 @ 17:22)    Culture - Urine (collected 05-13-25 @ 17:22)  Source: Catheterized None  Final Report (05-16-25 @ 10:50):    >100,000 CFU/ml Escherichia coli ESBL  Organism: Escherichia coli ESBL (05-16-25 @ 10:50)  Organism: Escherichia coli ESBL (05-16-25 @ 10:50)      -  Levofloxacin: R >4      -  Tobramycin: S <=2      -  Nitrofurantoin: S <=32 Should not be used to treat pyelonephritis      -  Aztreonam: R 8      -  Gentamicin: S <=2      -  Cefazolin: R >16 For uncomplicated UTI with K. pneumoniae, E. coli, or P. mirablis: NAGI <=16 is sensitive and NAGI >=32 is resistant. This also predicts results for oral agents cefaclor, cefdinir, cefpodoxime, cefprozil, cefuroxime axetil, cephalexin and locarbef for uncomplicated UTI. Note that some isolates may be susceptible to these agents while testing resistant to cefazolin.      -  Cefepime: R >16      -  Piperacillin/Tazobactam: S <=8      -  Ciprofloxacin: R >2      -  Imipenem: S <=1      -  Ceftriaxone: R >32      -  Ampicillin: R >16 These ampicillin results predict results for amoxicillin      Method Type: NAGI      -  Meropenem: S <=1      -  Ampicillin/Sulbactam: S <=4/2      -  Cefuroxime: R >16      -  Trimethoprim/Sulfamethoxazole: R >2/38      -  Tigecycline: S <=2 Interpretations based on FDA breakpoints      -  Ertapenem: S <=0.5    Culture - Blood (collected 05-13-25 @ 15:45)  Source: Blood Blood-Peripheral  Final Report (05-18-25 @ 23:00):    No growth at 5 days    Culture - Blood (collected 05-13-25 @ 15:35)  Source: Blood Blood-Peripheral  Final Report (05-18-25 @ 23:00):    No growth at 5 days        Blood Gas Venous - Lactate: 1.2 mmol/L (05-16-25 @ 06:00)  Blood Gas Venous - Lactate: 1.2 mmol/L (05-15-25 @ 18:02)      INFECTIOUS DISEASES TESTING  MRSA PCR Result.: Negative (05-14-25 @ 07:20)  MRSA PCR Result.: Negative (04-14-25 @ 17:16)  Procalcitonin: 5.22 (11-05-24 @ 19:32)  MRSA PCR Result.: Negative (11-04-24 @ 13:15)      INFLAMMATORY MARKERS  Sedimentation Rate, Erythrocyte: 45 mm/hr (05-14-25 @ 22:15)  C-Reactive Protein: 154.6 mg/L (05-14-25 @ 22:15)      RADIOLOGY & ADDITIONAL TESTS:  I have personally reviewed the last available Chest xray  CXR      CT  CT Abdomen and Pelvis w/ Oral Cont and w/ IV Cont:   ACC: [id number] EXAM:  CT ABDOMEN AND PELVIS OC IC   ORDERED BY: DAISY TIAN     ACC: [id number] EXAM:  CT CHEST IC   ORDERED BY: DAISY TIAN     *** ADDENDUM # 1 ***    Inflammatory changes inferior to the left lobe of the liver are   nonspecific and may reflect postoperative in etiology as well as   inflammatory or infectious etiology. Biliary leak cannot be excluded   either. If clinically warranted, nuclear medicine HIDA scan (biliary leak   study) may be of benefit.    Small perihepatic and perisplenic ascites as well as anasarca.    --- End of Report ---    *** END OF ADDENDUM # 1 ***      PROCEDURE DATE:  05/19/2025          INTERPRETATION:  CLINICAL INFORMATION: Respiratory abnormality and post   exploratory laparotomy and distal antrectomy    COMPARISON: 1/31/2023 chest CT and abdominal and pelvic CT dated 5/13/2025    CONTRAST/COMPLICATIONS:  IV Contrast: Omnipaque 350 (accession [id number]), IV contrast documented   in unlinked concurrent exam (accession [id number])  100 cc administered   0   cc discarded  Oral Contrast: Omnipaque 300 (accession [id number]), NONE (accession   [id number])  .    PROCEDURE:  CT of the Chest, Abdomen and Pelvis was performed.  Sagittal and coronal reformats were performed.    FINDINGS:  CHEST:  LUNGS, PLEURA, AIRWAYS: Bilateral small pleural effusions with associated   lower lobe compressive atelectasis. Superimposed infectious process is   not excluded. Partial obstruction of the distal right lower lobe   bronchioles, similar to the prior exam. Otherwise no evidence of central   endobronchial obstruction. Tracheostomy tube in place. No focal   consolidation. No pneumothorax.    THORACIC NODES: Left periaortic lymph node measuring up to 1 x 0.8 cm   (6/35), previously measuring up to 1.4 x 1.1 cm. No supraclavicular or   axillary lymphadenopathy.    MEDIASTINUM/GREAT VESSELS: Cardiomegaly. No pericardial effusion. Dilated   main pulmonary artery measures up to 3.7 cm. The aorta is of normal   caliber. Coronary atherosclerosis. Feeding tube looping in the stomach.    ABDOMEN AND PELVIS:  LIVER: Periportal edema.  BILE DUCTS: Normal caliber.  GALLBLADDER: Status post cholecystectomy.  SPLEEN: Within normal limits.  PANCREAS: Inflammatory changes are noted in the region of the pancreatic   neck inferior to the left lobe of the liver compatible with region of   prior surgery and prior inflammation. No drainable collection is seen in   this region.  ADRENALS: Unchanged bilateral adrenal gland nodules, left greater than   right.  KIDNEYS/URETERS: Atrophic left kidney. No hydronephrosis. Unremarkable   right kidney.    BLADDER: Urinary bladder is decompressed with Burger catheter balloon in   place.  REPRODUCTIVE ORGANS: Calcified uterine fibroid.    BOWEL: Post antrectomy and gastrojejunostomy.  PERITONEUM/RETROPERITONEUM: No focal drainable fluid collection.   Percutaneous drain courses through the right upper quadrant inferior to   the left lobe of the liver. No drainable collection is seen. Trace amount   of presacral fluid.  VESSELS: Scattered atherosclerotic vascular calcifications. 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  LYMPH NODES: No enlarged abdominal or pelvic lymph nodes.  ABDOMINAL WALL: Postoperative changes  BONES: Degenerative changes of the spine. No acute osseous abnormality.      IMPRESSION:      Post distal antrectomy and gastrojejunostomy without evidence of   drainable fluid collection or contrast extravasation.    Percutaneous drainage catheter terminating in the region of the right   upper quadrant inferior to the liver with nearby inflammatory changes. No   evidence of drainable fluid collection    Bilateral small pleural effusions with compressive atelectasis.   Superimposed infectious process is not excluded.    --- End of Report ---    ***Please see the addendum at the top of this report. It may contain   additional important information or changes.****        CHARANJIT RANGEL MD; Attending Radiologist  This document has been electronically signed. May 19 2025 10:01PM  1st Addendum: CHARANJIT RANGEL MD; Attending Radiologist  The first addendum was electronically signed on: May 19 2025 10:24PM. (05-19-25 @ 18:30)      CARDIOLOGY TESTING  12 Lead ECG:   Ventricular Rate 83 BPM    QRS Duration 104 ms    Q-T Interval 380 ms    QTC Calculation(Bazett) 446 ms    R Axis -22 degrees    T Axis 28 degrees    Diagnosis Line Atrial fibrillation  Low voltage QRS  Septal infarct , age undetermined  Abnormal ECG    Confirmed by Sanjeev Pierce ([id number]) on 5/14/2025 6:59:31 PM (05-13-25 @ 23:32)  12 Lead ECG:   Ventricular Rate 82 BPM    QRS Duration 104 ms    Q-T Interval 396 ms    QTC Calculation(Bazett) 462 ms    R Axis 144 degrees    T Axis 33 degrees    Diagnosis Line Atrial fibrillation  Right axis deviation  Low voltage QRS  Abnormal ECG    Confirmed by Sanjeev Pierce ([id number]) on 5/14/2025 6:56:22 PM (05-13-25 @ 17:11)      MEDICATIONS  acetaminophen   IVPB .. 1000  albuterol    90 MICROgram(s) HFA Inhaler 2  caspofungin IVPB 50  caspofungin IVPB   chlorhexidine 0.12% Liquid 15  chlorhexidine 2% Cloths 1  enoxaparin Injectable 40  ipratropium 17 MICROgram(s) HFA Inhaler 1  meropenem  IVPB 1000  nystatin Powder 1  pantoprazole  Injectable 40      WEIGHT  Weight (kg): 89 (05-20-25 @ 05:00)  Creatinine: 0.9 mg/dL (05-19-25 @ 21:40)      ANTIBIOTICS:  caspofungin IVPB 50 milliGRAM(s) IV Intermittent every 24 hours  caspofungin IVPB      meropenem  IVPB 1000 milliGRAM(s) IV Intermittent every 8 hours      All available historical records have been reviewed

## 2025-05-20 NOTE — PROGRESS NOTE ADULT - ASSESSMENT
74y old Female with above PMHx, s/p exlap and second look laparotomy with billroth II and replacement of GJ    Plan:  - SICU management   - wean pressors as tolerated   - saturating well on T piece   - BULMARO drain with serobilious output (new from previous)  - pain control   - monitor output   - keep NGT to suction  - restart feeds today as tolerated    SURGERY SPECTRA: 1415

## 2025-05-20 NOTE — PROGRESS NOTE ADULT - SUBJECTIVE AND OBJECTIVE BOX
GENERAL SURGERY PROGRESS NOTE    Patient: PATRICIA SPRAGUE , 74y ([birth year])Female   MRN: [id number]  Location: [place] [address] [facility]  Visit: 05-13-25 Inpatient  Date: 05-20-25 @ 16:08    Hospital Day #: 8  Post-Op Day #: 6    Procedure/Dx/Injuries: PERFORATED DUODENAL ULCER  S/P EX LAP AND ANTRECTOMY, ABD LEFT OPEN W/ ABTHERA  5/17 S/P 2ND LOOK LAP, BILROTH 2, PLACEMENT OF J     Events of past 24 hours:  5/19  NIGHT  - T-piece, 40%/5, HR 80s, SBP 120s, Vaso paused @ 8pm, BULMARO bilious, TF paused  - CTAP --> Post distal antrectomy and gastrojejunostomy without evidence of   drainable fluid collection or contrast extravasation. Percutaneous drainage catheter terminating in the region of the right upper quadrant inferior to the liver with nearby inflammatory changes. No evidence of drainable fluid collection. Bilateral small pleural effusions with compressive atelectasis. Superimposed infectious process is not excluded.  - HIDA scan?  - 1g Mg, 20mEq KPO4  - Hgb 7.8 > 7.1 > 7.9    DAY  -chest pt  -f/u CT C,A/P PO/IV contrast   -possible octreotide   -changed to LVX    PAST MEDICAL & SURGICAL HISTORY:  HTN (hypertension)  Diabetes mellitus  Obesity  Gastroesophageal reflux disease  Active asthma  Generalized OA  Afib  H/O hernia repair  2011 and revised in 2013  History of cholecystectomy  Status post debridement  H/O tracheostomy  S/P gastrostomy  S/P jejunostomy    Vitals:   T(F): 96.9 (05-20-25 @ 08:00), Max: 97.6 (05-19-25 @ 20:00)  HR: 67 (05-20-25 @ 15:00)  BP: 97/53 (05-20-25 @ 15:00)  RR: 18 (05-20-25 @ 15:00)  SpO2: 100% (05-20-25 @ 15:00)    Diet, NPO with Tube Feed:   Tube Feeding Modality: Jejunostomy  Glucerna 1.2 Jeff (GLUCERNARTH)  Total Volume for 24 Hours (mL): 480  Continuous  Starting Tube Feed Rate mL per Hour: 10  Increase Tube Feed Rate by (mL): 10     Every 4 hours  Until Goal Tube Feed Rate (mL per Hour): 20  Tube Feed Duration (in Hours): 24  Tube Feed Start Time: 10:00    Fluids:     I & O's:    05-19-25 @ 07:01  -  05-20-25 @ 07:00  --------------------------------------------------------  IN:    IV PiggyBack: 100 mL    IV PiggyBack: 250 mL    IV PiggyBack: 50 mL    IV PiggyBack: 100 mL    IV PiggyBack: 250 mL    Lactated Ringers: 1150 mL    Vasopressin (Organ Donation): 54 mL  Total IN: 1954 mL    OUT:    Bulb (mL): 210 mL    Nasogastric/Oral tube (mL): 100 mL    Ureteral Catheter (mL): 1000 mL  Total OUT: 1310 mL    Total NET: 644 mL    PHYSICAL EXAM:  General: NAD, following commands  Cardiac: RRR  Respiratory: Chest rise equal bilaterally, no labored breathing on t-piece  Abdomen: Soft, non-distended, non-tender, left BULMARO with serobilious output  Skin: Warm/dry, normal color, no jaundice  Incision/wound: midline incision healing well, dressings in place, clean, dry and intact    MEDICATIONS  (STANDING):  acetaminophen   IVPB .. 1000 milliGRAM(s) IV Intermittent once  albuterol    90 MICROgram(s) HFA Inhaler 2 Puff(s) Inhalation every 6 hours  chlorhexidine 0.12% Liquid 15 milliLiter(s) Oral Mucosa every 12 hours  chlorhexidine 2% Cloths 1 Application(s) Topical <User Schedule>  enoxaparin Injectable 40 milliGRAM(s) SubCutaneous every 24 hours  fluconAZOLE IVPB 400 milliGRAM(s) IV Intermittent once  ipratropium 17 MICROgram(s) HFA Inhaler 1 Puff(s) Inhalation every 6 hours  meropenem  IVPB 1000 milliGRAM(s) IV Intermittent every 8 hours  nystatin Powder 1 Application(s) Topical two times a day  pantoprazole  Injectable 40 milliGRAM(s) IV Push every 12 hours    MEDICATIONS  (PRN):  HYDROmorphone  Injectable 0.25 milliGRAM(s) IV Push every 6 hours PRN Severe Pain (7 - 10)  sodium chloride 0.9% lock flush 10 milliLiter(s) IV Push every 1 hour PRN Pre/post blood products, medications, blood draw, and to maintain line patency    DVT PROPHYLAXIS: enoxaparin Injectable 40 milliGRAM(s) SubCutaneous every 24 hours  GI PROPHYLAXIS: pantoprazole  Injectable 40 milliGRAM(s) IV Push every 12 hours  ANTIBIOTICS:  fluconAZOLE IVPB 400 milliGRAM(s)  meropenem  IVPB 1000 milliGRAM(s)    LAB/STUDIES:  Labs:  CAPILLARY BLOOD GLUCOSE  POCT Blood Glucose.: 69 mg/dL (20 May 2025 12:31)  POCT Blood Glucose.: 70 mg/dL (20 May 2025 05:37)  POCT Blood Glucose.: 76 mg/dL (19 May 2025 23:40)  POCT Blood Glucose.: 85 mg/dL (19 May 2025 17:26)                     7.9    10.58 )-----------( 207      ( 20 May 2025 06:50 )             23.6       Auto Immature Granulocyte %: 0.7 % (05-19-25 @ 21:40)    05-19    140  |  108  |  29[H]  ----------------------------<  75  3.9   |  21  |  0.9      Calcium: 10.4 mg/dL (05-19-25 @ 21:40)      LFTs:             4.2  | 0.4  | 11       ------------------[82      ( 18 May 2025 23:22 )  2.4  | 0.2  | 9           Lipase:x      Amylase:x         Blood Gas Arterial, Lactate: 0.7 mmol/L (05-19-25 @ 04:13)  Blood Gas Arterial, Lactate: 0.8 mmol/L (05-18-25 @ 04:12)    ABG - ( 19 May 2025 04:13 )  pH: 7.39  /  pCO2: 38    /  pO2: 101   / HCO3: 23    / Base Excess: -1.8  /  SaO2: 98.0      ABG - ( 18 May 2025 04:12 )  pH: 7.42  /  pCO2: 33    /  pO2: 88    / HCO3: 21    / Base Excess: -2.6  /  SaO2: 98.1      ABG - ( 17 May 2025 08:44 )  pH: 7.44  /  pCO2: 31    /  pO2: 158   / HCO3: 21    / Base Excess: -2.6  /  SaO2: 99.1        Urinalysis Basic - ( 19 May 2025 21:40 )    Color: x / Appearance: x / SG: x / pH: x  Gluc: 75 mg/dL / Ketone: x  / Bili: x / Urobili: x   Blood: x / Protein: x / Nitrite: x   Leuk Esterase: x / RBC: x / WBC x   Sq Epi: x / Non Sq Epi: x / Bacteria: x

## 2025-05-20 NOTE — CHART NOTE - NSCHARTNOTEFT_GEN_A_CORE
Registered Dietitian Follow-Up     Patient Profile Reviewed                           Yes [x]   No []     Nutrition History Previously Obtained        Yes [x]  No []       Pertinent Medical Interventions: Admitted to SICU for perforated duodenal ulcer. s/p OR for exploratory laparotomy ; pt then RTOR  s/p second look laparotomy, Billroth II reconstruction, gastrojejunostomy tube replaced with a jejunal feeding tube, abdomen closed at the end of the case. J tube, was tolerating trickle feeds > held 2/2 bilious output from BULMARO drain as per progress notes. NOLA - noted improving with no RRT at this time.    PMH includes HTN, a-fib (on xarelto), solitary kidney, DM, GERD, prior open cholecystectomy c/b suture granulomas s/p  abdominal wall debridements by Dr. Banda, respiratory distress with prolonged ventilation during 2024 admission, s/p  tracheostomy and gastrostomy and feeding jejunostomy tube by Dr. Champion, ya catheter dependence.     Diet order: Currently with no active diet order. Was receiving Glucerna 1.2 at 10 mL/hr earlier today, but has been d/c'd today.    Anthropometrics:  Height (cm): 167.6 (05-16-25 @ 15:13)  Weight (kg): 89 (20-25 @ 05:00)  BMI (kg/m2): 31.7 (-20-25 @ 05:00)  IBW: 59.1 kg.    Daily Weight in k.8 (05-19), Weight in k (05-15)    Wt gain observed suspected to be related to fluid shifts; 3+ generalized edema noted.    MEDICATIONS  (STANDING):  acetaminophen   IVPB .. 1000 milliGRAM(s) IV Intermittent once  albuterol    90 MICROgram(s) HFA Inhaler 2 Puff(s) Inhalation every 6 hours  chlorhexidine 0.12% Liquid 15 milliLiter(s) Oral Mucosa every 12 hours  chlorhexidine 2% Cloths 1 Application(s) Topical <User Schedule>  dextrose 5% + sodium chloride 0.45%. 1000 milliLiter(s) (50 mL/Hr) IV Continuous <Continuous>  enoxaparin Injectable 40 milliGRAM(s) SubCutaneous every 24 hours  fluconAZOLE IVPB 400 milliGRAM(s) IV Intermittent once  ipratropium 17 MICROgram(s) HFA Inhaler 1 Puff(s) Inhalation every 6 hours  meropenem  IVPB 1000 milliGRAM(s) IV Intermittent every 8 hours  nystatin Powder 1 Application(s) Topical two times a day  pantoprazole  Injectable 40 milliGRAM(s) IV Push every 12 hours    MEDICATIONS  (PRN):  HYDROmorphone  Injectable 0.25 milliGRAM(s) IV Push every 6 hours PRN Severe Pain (7 - 10)  sodium chloride 0.9% lock flush 10 milliLiter(s) IV Push every 1 hour PRN Pre/post blood products, medications, blood draw, and to maintain line patency    Pertinent Labs:  @ 20:30: Na 146, BUN 23[H], Cr 0.9, [H], K+ 3.6, Phos 2.9, Mg 2.2    Finger Sticks:  POCT Blood Glucose.: 87 mg/dL ( @ 20:33)  POCT Blood Glucose.: 94 mg/dL ( @ 20:13)  POCT Blood Glucose.: 65 mg/dL ( @ 17:57)  POCT Blood Glucose.: 69 mg/dL ( @ 12:31)  POCT Blood Glucose.: 70 mg/dL ( @ 05:37)  POCT Blood Glucose.: 76 mg/dL ( @ 23:40)    Physical Findings:  - Appearance: alert; unable to speak  - GI function: last BM ; abd noted non-distended  - Tubes: PEJ  - Oral/Mouth cavity: NPO  - Skin: surgical incision (abd)  - Edema: 3+ generalized edema     Nutrition Requirements:  Weight Used: 87 kg as per  assessment; remains appropriate at this time     Estimated Energy Needs    Continue [x]  Adjust []  0548-2183 kcal/day (20-25 kcal/kg ABW)        Estimated Protein Needs    Continue [x]  Adjust []   g/day (1.5-2 g/kg IBW 59 KG)        Estimated Fluid Needs        Continue []  Adjust [x]  1 mL/kcal     Nutrient Intake: Currently with no active diet order; not meeting estimated nutrient needs at this time.    [x] Previous Nutrition Diagnosis: Inadequate protein-energy intake            [x] Ongoing          [] Resolved     Nutrition Education: Reviewed previous tube feed regimen.     Goal/Expected Outcome: Nutrition support regimen resumed as soon as medically feasible; pt to demonstrate tolerance to nutrition support regimen, meeting >85% & <105% of estimated nutrient needs over next 4 days. Pt at high nutrition risk.     Indicator/Monitoring: Skin, labs, BM, wt, nutrition focused physical findings, body composition, GI, diet order & tube feed tolerance.     Recommendation: When able to resume tube feeds, would change tube feed regimen to Peptamen AF at 10 mL/hr to assess tolerance to tube feed formula. If tolerance to tube feed regimen observed, and able to increase goal rate beyond trickle rate, would increase Peptamen AF goal rate by 10 mL q4-6hrs as tolerated to goal rate 80 mL/hr over 18 hr duration (total daily Peptamen AF volume = 1440 mL/day). Tube feed regimen at goal to provide 1728 kcal, 108 g protein, 1166 mL free H2O. Provide 100 mL flushes q4hrs.

## 2025-05-20 NOTE — PROGRESS NOTE ADULT - ASSESSMENT
74F found to have perforated peptic ulcer now s/p ex lap, distal antrectomy, abthera placement requiring vasopressors. RTOR 5/16 s/p second look laparotomy, Billroth II reconstruction, gastrojejunostomy tube replaced with a jejunal feeding tube, abdomen closed at the end of the case.     NEUROLOGICAL:  #Acute pain   - IV APAP while NPO   - oxycodone 2.5mg prn moderate pain, holding    - oxycodone 5mg prn severe pain, holding     RESPIRATORY:   #Respiratory failure, s/p tracheostomy (11/24)    - now with 8 portex cuffed trach (switched in OR on 5/14)    - toelrating T- piece x 24 hrs (5/17)    - AM ABG    - continue duonebs   #Decreased small bibasilar opacities/atelectasis, right greater than left on CT  - 5/16: s/p deep suctioning by anesthesia w/ copious secretions   #h/o asthma  - atrovent and albuterol   #Activity   - increase as tolerated     CARDS:   #Nonsustained Vtach - self resolved   - cards consult: management of septic shock. Start short acting AC for afib. Start beta blockers once off pressors   #hypotension 2/2 sepsis   - off levophed gtt 5/17   - Remains on vasopressin @0.03  - MAP >65  - Started midodrine 10 mg Q8h started 5/18, holding while strict NPO   #hx afib  - transiently was in AFIB slow ventricular response (HR 40s-60s), remains on low dose pressors   - holding home Xarelto 15 QD  #hx HTN  - holding home losartan 100QD  #elevated troponin  - 143 > 84   - no longer trending  #TTE 11/24: EF 71%. Mild-mod AS. Trivial pericardial effusion.   - Echo 5/15: EF 30-35%, multiple left ventricular motion wall abnormalities. Possible Takotsubo vs ischemic. Global left ventricular systolic function.     GASTROINTESTINAL/NUTRITION:   #perforated prepyloric gastric ulcer s/p exploratory laparotomy antrectomy and D1 stapled off with temporary abdominal closure and abthera in place  - 5/16: RTOR s/p bilroth 2, J tube placement, RLQ maliha drain by anastomosis extending to duodenal stump  - 5/18: BULMARO drain now w/ bilious output > pt made strict NPO, f/u drain creatinine/bilirubin composition, LFTs WNL     Liver Biochemical Testing Trend:  Aspartate Aminotransferase (AST/SGOT): 11 (05-18-25 @ 23:22)  Alanine Aminotransferase (ALT/SGPT): 9 (05-18-25 @ 23:22)  Bilirubin Direct: 0.2 (05-18-25 @ 23:22)    #Diet, STRICT NPO     - J tube, was tolerating trickle feeds > held 2/2 bilious output from BULMARO drain     - medications through NGT    - aspiration precautions, HOB 30  #GI Prophylaxis    - protonix 40mg bid IV  #Bowel regimen    -holding    -last bowel movement 5/17    /RENAL:   #urine output in critically ill    -chronic indwelling ya > new ya exchanged 5/17  #Maintain euvolemia   - LR @ 50cc/hr while NPO   #metabolic acidosis, resolved  #NOLA (baseline creatine 0.6), improving   - nephrology consulted > no RRT at this time  #oliguria  - intermittent albumin    #hypomagnesemia, hypophosphatemia  - 1g Mg, 20mEq KPO4      Labs:          BUN/Cr -  31/0.9  -->,  29/0.9  -->          [05-19 @ 21:40]Na  140 // K  3.9 // Mg  1.9 // Phos  2.8  [05-18 @ 23:22]Na  139 // K  4.0 // Mg  1.9 // Phos  2.8    HEME/ONC:   #DVT prophylaxis    -LVX    -SCDs  #anemia  - 1pRBC 5/17 to help wean off vasopressors      Labs: Hgb/Hct:  7.8/24.1  (05-18 @ 23:22)  -->,  7.1/22.0  (05-19 @ 21:40)  -->  pending CBC              Platelets:  168  -->,  187  -->                 PTT/INR:        ID:  #perforated duodenal ulcer  - zosyn (renal dosing) (5/14 - 5/16)  - Meropenem (5/16 - )  - caspofungin per primary team (5/14 - )  - Nystatin powder for groin rash (5/15 - )  #COVID positive on admission    -  completed remdesivir 2 day course  #Cultures    - 5/13: blood culture: NGTD    - 5/13: blood culture: NGTD    - 5/13: urine culture: E Coli  - ID following    WBC -  12.14  --->>,  12.59  --->>,  9.64  --->>  Temp trend - 24hrs T(F): 96.9 (05-20 @ 00:00), Max: 98.7 (05-19 @ 16:00)    Current antibiotics - caspofungin IVPB 50 IV Intermittent every 24 hours  caspofungin IVPB     meropenem  IVPB 1000 IV Intermittent every 8 hours, Stop order after: 5 Days    ENDOCRINE:  #glycemic monitoring    - FSG q6 while NPO    - Glucose goal 140-180. If above 180, will start corrective insulin sliding scale  #hypercalcemia  -  home cinacalcet 30mg bid, must be crushed well to not clog NGT - holding while NPO     MSK:  #Activity - increase as tolerated   #hx osteoporosis  - holding home Alendronate 70mg weekly on Thursdays while NPO    SKIN:  #DTI screening negative 5/18    - will continue to monitor daily skin changes    PALLIATIVE:  - Currently full code, palliative consult     LINES/DRAINS:  PIV, Ya (replaced 5/17), J tube (5/16), 8 cuffed portex trach, R radial arterial line (5/14- ), LIJ CVC (5/14- )  ADVANCED DIRECTIVES:  Full Code    HCP/Emergency Contact- pérez Brooks [phone]  INDICATION FOR SICU/SDU:  perforated peptic ulcer; mechanical ventilation/vasopressors     DISPO:  SICU. Case to be discussed with attending Dr. Pedersen 74F found to have perforated peptic ulcer now s/p ex lap, distal antrectomy, abthera placement requiring vasopressors. RTOR 5/16 s/p second look laparotomy, Billroth II reconstruction, gastrojejunostomy tube replaced with a jejunal feeding tube, abdomen closed at the end of the case.     NEUROLOGICAL:  #Acute pain   - IV APAP while NPO   - oxycodone 2.5mg prn moderate pain, holding    - oxycodone 5mg prn severe pain, holding     RESPIRATORY:   #Respiratory failure, s/p tracheostomy (11/24)    - now with 8 portex cuffed trach (switched in OR on 5/14)    - tolerating T- piece x 24 hrs (5/17)    - AM ABG    - continue duonebs   #Decreased small bibasilar opacities/atelectasis, right greater than left on CT  - 5/16: s/p deep suctioning by anesthesia w/ copious secretions   #h/o asthma  - atrovent and albuterol   #Activity   - increase as tolerated     CARDS:   #Nonsustained Vtach - self resolved   - cards consult: management of septic shock. Start short acting AC for afib. Start beta blockers once off pressors   #hypotension 2/2 sepsis   - off levophed gtt 5/17   - Off vasopressin 5/19  - MAP >65  - Started midodrine 10 mg Q8h started 5/18, holding while strict NPO   #hx afib  - transiently was in AFIB slow ventricular response (HR 40s-60s), remains on low dose pressors   - holding home Xarelto 15 QD  #hx HTN  - holding home losartan 100QD  #elevated troponin  - 143 > 84   - no longer trending  #TTE 11/24: EF 71%. Mild-mod AS. Trivial pericardial effusion.   - Echo 5/15: EF 30-35%, multiple left ventricular motion wall abnormalities. Possible Takotsubo vs ischemic. Global left ventricular systolic function.     GASTROINTESTINAL/NUTRITION:   #perforated prepyloric gastric ulcer s/p exploratory laparotomy antrectomy and D1 stapled off with temporary abdominal closure and abthera in place  - 5/16: RTOR s/p bilroth 2, J tube placement, RLQ maliha drain by anastomosis extending to duodenal stump  - 5/18: BULMARO drain now w/ bilious output > pt made strict NPO, f/u drain creatinine/bilirubin composition, LFTs WNL   - CT A/P 5/20: Post distal antrectomy and gastrojejunostomy without evidence of drainable fluid collection or contrast extravasation.  #Diet, STRICT NPO     - J tube, was tolerating trickle feeds > held 2/2 bilious output from BULMARO drain     - medications through NGT    - aspiration precautions, HOB 30  #GI Prophylaxis    - protonix 40mg bid IV  #Bowel regimen    -holding    -last bowel movement 5/17    /RENAL:   #urine output in critically ill    -chronic indwelling ya > new ya exchanged 5/17  #Maintain euvolemia   - LR @ 50cc/hr while NPO   #metabolic acidosis, resolved  #NOLA (baseline creatine 0.6), improving   - nephrology consulted > no RRT at this time    Labs:   BUN/Cr- 31/0.9  -->,  29/0.9  -->  [05-19 @ 21:40]Na  140 // K  3.9 // Mg  1.9 // Phos  2.8  [05-18 @ 23:22]Na  139 // K  4.0 // Mg  1.9 // Phos  2.8    HEME/ONC:   #DVT prophylaxis    -LVX    -SCDs  #anemia  - 1pRBC 5/17 to help wean off vasopressors    Labs: Hb/Hct:  7.1/22.0  (05-19 @ 21:40)  -->,  7.9/23.6  (05-20 @ 06:50)  -->                      Plts:  187  -->,  207  -->       ID:  #perforated duodenal ulcer  - zosyn (renal dosing) (5/14 - 5/16)  - Meropenem (5/16 - )  - caspofungin per primary team (5/14 - )  - Nystatin powder for groin rash (5/15 - )  #COVID positive on admission    -  completed remdesivir 2 day course  #Cultures    - 5/13: blood culture: NGTD    - 5/13: blood culture: NGTD    - 5/13: urine culture: E Coli  - ID following  WBC- 12.59  --->>,  9.64  --->>,  10.58  --->>  Temp trend- 24hrs T(F): 96.8 (05-20 @ 04:00), Max: 98.7 (05-19 @ 16:00)  Current antibiotics-caspofungin IVPB 50 every 24 hours  caspofungin IVPB    meropenem  IVPB 1000 every 8 hours  Current antibiotics - caspofungin IVPB 50 IV Intermittent every 24 hours  caspofungin IVPB     meropenem  IVPB 1000 IV Intermittent every 8 hours, Stop order after: 5 Days    ENDOCRINE:  #glycemic monitoring    - FSG q6 while NPO    - Glucose goal 140-180. If above 180, will start corrective insulin sliding scale  #hypercalcemia  -  home cinacalcet 30mg bid, must be crushed well to not clog NGT - holding while NPO     MSK:  #Activity - increase as tolerated   #hx osteoporosis  - holding home Alendronate 70mg weekly on Thursdays while NPO    SKIN:  #DTI screening negative 5/19    - will continue to monitor daily skin changes    PALLIATIVE:  - Currently full code, palliative consult     LINES/DRAINS:  PIV, Ya (replaced 5/17), J tube (5/16), 8 cuffed portex trach, R radial arterial line (5/14- ), LIJ CVC (5/14- )  ADVANCED DIRECTIVES:  Full Code    HCP/Emergency Contact- daughter Cecilia [phone]  INDICATION FOR SICU/SDU:  perforated peptic ulcer; mechanical ventilation/vasopressors     DISPO:  SICU. Case to be discussed with attending Dr. Pedersen

## 2025-05-20 NOTE — PROGRESS NOTE ADULT - ASSESSMENT
ASSESSMENT  81F PMHx HTN, a-fib (on xarelto), solitary kidney, DM, GERD, prior open cholecystectomy c/b suture granulomas s/p 2019 abdominal wall debridements by Dr. Banda, respiratory distress with prolonged ventilation during 11/2024 admission, s/p 11/19 tracheostomy and gastrostomy and feeding jejunostomy tube by Dr. Champion, ya catheter dependence who presented to the ED from NH on 5/13 with 4 days progressively worsening abdominal pain. Surgery consulted for imaging concerning for duodenal perforation. S/p OR, ID consulted for antimicrobial management       IMPRESSION  #Concern for anastomotic leak  < from: CT Abdomen and Pelvis w/ Oral Cont and w/ IV Cont (05.19.25 @ 18:30) >  Inflammatory changes inferior to the left lobe of the liver are nonspecific and may reflect postoperative in etiology as well as   inflammatory or infectious etiology. Biliary leak cannot be excluded either. If clinically warranted, nuclear medicine HIDA scan (biliary leak   study) may be of benefit.  Small perihepatic and perisplenic ascites as well as anasa Post distal antrectomy and gastrojejunostomy without evidence of   drainable fluid collection or contrast extravasation.  Percutaneous drainage catheter terminating in the region of the right upper quadrant inferior to the liver with nearby inflammatory changes. No   evidence of drainable fluid collection  Bilateral small pleural effusions with compressive atelectasis. Superimposed infectious process is not excluded.  #Perforated Duodenal Ulcer, septic shock requiring pressors     Afebrile; Admission WBC 18    5/13 BCX NGTD     5/13 UCX   >100,000 CFU/ml Escherichia coli ESBL S zosyn  Procedure	PROCEDURES:  Partial antrectomy 14-May-2025 03:37:14  Carlos Cheatham  Operative Findings	prepyloric ulcer 4 cm perforation anterior stomach s/p antrectomy, D1 stapled off due to friable tissue to ensure healthy tissue for staple line. Temporary abdominal closure due to HD instability.  < from: CT Abdomen and Pelvis w/ IV Cont (05.13.25 @ 18:16) >  *1. Since November 8, 2024, new moderate mural thickening and inflammation involving the proximal proximal duodenal wall, with focal   defect/outpouching along lateral duodenum, compatible with perforated duodenal ulcer; no focal drainable fluid collection.  2. Additional chronic/incidental findings, as detailed above.  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 Narrow to Fluconazole 400mg x1 then 200mg q24h IV/PO  - F/u OR path  - Given possible leak will need long term IV Abx     If any questions, please send a message or call on [facility] [employer]  Please continue to update ID with any pertinent new laboratory, radiographic findings, or change in clinical status

## 2025-05-20 NOTE — PROGRESS NOTE ADULT - SUBJECTIVE AND OBJECTIVE BOX
PATRICIA SPRAGUE   [id number]   [birth year]    74yF  ============================================================   DATE OF INITIAL SICU/SDU CONSULT: 05-13-25    INDICATION FOR SICU CONSULT:  perforated duodenal ulcer      SICU COURSE EVENTS :  05-13 - admitted to SICU service  05-14 - s/p OR for exploratory laparotomy, started remdesivir for covid +, echo ordered  5/15 - Echo showing EF 30-35%, cardiology consulted. Pending RTOR. Remains on vasopressors, weaning.  5/16: S/p Exploratory laparotomy, bilroth 2, J tube placement. Episode of wheezing. Changed to meropenem per ID. AFIB slow ventricular response HR 40s-60s, remains on low dose pressors   5/17: Tolerating T piece, trickle feed started via J tube, s/p 1u PRBC to wean off levophed, remains on vasopressin, IVL  5/18: Midodrine 10mg q8 started, weaned off levophed. BULMARO drain bilious output > strict NPO, f/u fluid composition   5/19: CT A/P - no drainable collection, may be postoperative or inflammatory/infectious. cannot r/o biliary leak.     24HOUR EVENTS  5/19  NIGHT  - T-piece, 40%/5, HR 80s, SBP 120s, Vaso paused @ 8pm, BULMARO bilious, TF paused  - CTAP --> Post distal antrectomy and gastrojejunostomy without evidence of drainable fluid collection or contrast extravasation. Percutaneous drainage catheter terminating in the region of the right upper quadrant inferior to the liver with nearby inflammatory changes. No evidence of drainable fluid collection. Bilateral small pleural effusions with compressive atelectasis. Superimposed infectious process is not excluded. *** ADDENDUM # 1 - Inflammatory changes inferior to the left lobe of the liver are nonspecific and may reflect postoperative in etiology as well as inflammatory or infectious etiology. Biliary leak cannot be excluded either. If clinically warranted, nuclear medicine HIDA scan (biliary leak study) may be of benefit. Small perihepatic and perisplenic ascites as well as anasarca.  - HIDA scan?  - 1g Mg, 20mEq KPO4  - Hgb 7.8 > 7.1, rpt CBC in AM    DAY  -chest pt  -f/u CT C,A/P PO/IV contrast   -possible octreotide   -changed to LVX    [X] A ten-point review of systems was negative except as expressed in note.  [X] History was obtained from patient. If unable to participate in their care, history obtained from review of the chart and collateral sources of information.  ============================================================    PATRICIA SPRAGUE   [id number]   [birth year]    74yF  ============================================================   DATE OF INITIAL SICU/SDU CONSULT: 05-13-25    INDICATION FOR SICU CONSULT:  perforated duodenal ulcer      SICU COURSE EVENTS :  05-13 - admitted to SICU service  05-14 - s/p OR for exploratory laparotomy, started remdesivir for covid +, echo ordered  5/15 - Echo showing EF 30-35%, cardiology consulted. Pending RTOR. Remains on vasopressors, weaning.  5/16: S/p Exploratory laparotomy, bilroth 2, J tube placement. Episode of wheezing. Changed to meropenem per ID. AFIB slow ventricular response HR 40s-60s, remains on low dose pressors   5/17: Tolerating T piece, trickle feed started via J tube, s/p 1u PRBC to wean off levophed, remains on vasopressin, IVL  5/18: Midodrine 10mg q8 started, weaned off levophed. BULMARO drain bilious output > strict NPO, f/u fluid composition   5/19: CT A/P - no drainable collection, may be postoperative or inflammatory/infectious. cannot r/o biliary leak.     24HOUR EVENTS  5/19  NIGHT  - T-piece, 40%/5, HR 80s, SBP 120s, Vaso paused @ 8pm, BULMARO bilious, TF paused  - CTAP --> Post distal antrectomy and gastrojejunostomy without evidence of drainable fluid collection or contrast extravasation. Percutaneous drainage catheter terminating in the region of the right upper quadrant inferior to the liver with nearby inflammatory changes. No evidence of drainable fluid collection. Bilateral small pleural effusions with compressive atelectasis. Superimposed infectious process is not excluded. *** ADDENDUM # 1 - Inflammatory changes inferior to the left lobe of the liver are nonspecific and may reflect postoperative in etiology as well as inflammatory or infectious etiology. Biliary leak cannot be excluded either. If clinically warranted, nuclear medicine HIDA scan (biliary leak study) may be of benefit. Small perihepatic and perisplenic ascites as well as anasarca.  - HIDA scan?  - 1g Mg, 20mEq KPO4  - Hgb 7.8 > 7.1, rpt CBC in AM    DAY  -chest pt  -f/u CT C,A/P PO/IV contrast   -possible octreotide   -changed to LVX    [X] A ten-point review of systems was negative except as expressed in note.  [X] History was obtained from patient. If unable to participate in their care, history obtained from review of the chart and collateral sources of information.  ============================================================   Daily       Diet, NPO (05-18-25 @ 22:48)      CURRENT MEDS:  Neurologic Medications  HYDROmorphone  Injectable 0.25 milliGRAM(s) IV Push every 6 hours PRN Severe Pain (7 - 10)    Respiratory Medications  albuterol    90 MICROgram(s) HFA Inhaler 2 Puff(s) Inhalation every 6 hours  ipratropium 17 MICROgram(s) HFA Inhaler 1 Puff(s) Inhalation every 6 hours    Gastrointestinal Medications  lactated ringers. 1000 milliLiter(s) IV Continuous <Continuous>  pantoprazole  Injectable 40 milliGRAM(s) IV Push every 12 hours  sodium chloride 0.9% lock flush 10 milliLiter(s) IV Push every 1 hour PRN Pre/post blood products, medications, blood draw, and to maintain line patency    Hematologic/Oncologic Medications  enoxaparin Injectable 40 milliGRAM(s) SubCutaneous every 24 hours    Antimicrobial/Immunologic Medications  caspofungin IVPB 50 milliGRAM(s) IV Intermittent every 24 hours  caspofungin IVPB      meropenem  IVPB 1000 milliGRAM(s) IV Intermittent every 8 hours    Endocrine/Metabolic Medications  vasopressin Infusion. 0.03 Unit(s)/Min IV Continuous <Continuous>    Topical/Other Medications  chlorhexidine 0.12% Liquid 15 milliLiter(s) Oral Mucosa every 12 hours  chlorhexidine 2% Cloths 1 Application(s) Topical <User Schedule>  nystatin Powder 1 Application(s) Topical two times a day      ICU Vital Signs Last 24 Hrs  T(C): 36 (20 May 2025 04:00), Max: 37.1 (19 May 2025 16:00)  T(F): 96.8 (20 May 2025 04:00), Max: 98.7 (19 May 2025 16:00)  HR: 81 (20 May 2025 06:00) (65 - 98)  BP: 121/65 (19 May 2025 17:00) (99/54 - 121/65)  BP(mean): 87 (19 May 2025 17:00) (71 - 87)  ABP: 137/63 (20 May 2025 06:00) (93/38 - 137/63)  ABP(mean): 85 (20 May 2025 06:00) (57 - 90)  RR: 28 (20 May 2025 06:00) (15 - 34)  SpO2: 100% (20 May 2025 06:00) (84% - 100%)    O2 Parameters below as of 19 May 2025 10:00  Patient On (Oxygen Delivery Method): T-piece  O2 Flow (L/min): 8  O2 Concentration (%): 40      ABG - ( 19 May 2025 04:13 )  pH, Arterial: 7.39  pH, Blood: x     /  pCO2: 38    /  pO2: 101   / HCO3: 23    / Base Excess: -1.8  /  SaO2: 98.0          I&O's Summary    19 May 2025 07:01  -  20 May 2025 07:00  --------------------------------------------------------  IN: 1954 mL / OUT: 1310 mL / NET: 644 mL      I&O's Detail    19 May 2025 07:01  -  20 May 2025 07:00  --------------------------------------------------------  IN:    IV PiggyBack: 100 mL    IV PiggyBack: 250 mL    IV PiggyBack: 50 mL    IV PiggyBack: 100 mL    IV PiggyBack: 250 mL    Lactated Ringers: 1150 mL    Vasopressin (Organ Donation): 54 mL  Total IN: 1954 mL    OUT:    Bulb (mL): 210 mL    Nasogastric/Oral tube (mL): 100 mL    Ureteral Catheter (mL): 1000 mL  Total OUT: 1310 mL    Total NET: 644 mL          PHYSICAL EXAM:    General/Neuro: GCS 11T. Following commands.  Lungs: Normal expansion/effort. On t- piece.  Cardiovascular : S1, S2.  Regular rate and rhythm.  Peripheral edema   Cardiac Rhythm: Normal Sinus Rhythm  GI: Abdomen soft, Non-tender, Non-distended.  Left BULMARO with yellow output. Midline dressing c/d/i.  Extremities: Extremities warm/ dry  Derm: No skin breakdown.    : Burger catheter in place.      LABS:  CAPILLARY BLOOD GLUCOSE      POCT Blood Glucose.: 70 mg/dL (20 May 2025 05:37)  POCT Blood Glucose.: 76 mg/dL (19 May 2025 23:40)  POCT Blood Glucose.: 85 mg/dL (19 May 2025 17:26)                          7.9    10.58 )-----------( 207      ( 20 May 2025 06:50 )             23.6       05-19    140  |  108  |  29[H]  ----------------------------<  75  3.9   |  21  |  0.9    Ca    10.4      19 May 2025 21:40  Phos  2.8     05-19  Mg     1.9     05-19    TPro  4.2[L]  /  Alb  2.4[L]  /  TBili  0.4  /  DBili  0.2  /  AST  11  /  ALT  9   /  AlkPhos  82  05-18        Urinalysis Basic - ( 19 May 2025 21:40 )    Color: x / Appearance: x / SG: x / pH: x  Gluc: 75 mg/dL / Ketone: x  / Bili: x / Urobili: x   Blood: x / Protein: x / Nitrite: x   Leuk Esterase: x / RBC: x / WBC x   Sq Epi: x / Non Sq Epi: x / Bacteria: x

## 2025-05-21 LAB
ANION GAP SERPL CALC-SCNC: 9 MMOL/L — SIGNIFICANT CHANGE UP (ref 7–14)
BASOPHILS # BLD AUTO: 0.02 K/UL — SIGNIFICANT CHANGE UP (ref 0–0.2)
BASOPHILS NFR BLD AUTO: 0.2 % — SIGNIFICANT CHANGE UP (ref 0–1)
BILIRUB FLD-MCNC: 9.7 MG/DL — SIGNIFICANT CHANGE UP
BUN SERPL-MCNC: 20 MG/DL — SIGNIFICANT CHANGE UP (ref 10–20)
CALCIUM SERPL-MCNC: 10 MG/DL — SIGNIFICANT CHANGE UP (ref 8.4–10.5)
CHLORIDE SERPL-SCNC: 111 MMOL/L — HIGH (ref 98–110)
CO2 SERPL-SCNC: 24 MMOL/L — SIGNIFICANT CHANGE UP (ref 17–32)
CREAT FLD-MCNC: 1 MG/DL — SIGNIFICANT CHANGE UP
CREAT SERPL-MCNC: 0.8 MG/DL — SIGNIFICANT CHANGE UP (ref 0.7–1.5)
EGFR: 78 ML/MIN/1.73M2 — SIGNIFICANT CHANGE UP
EGFR: 78 ML/MIN/1.73M2 — SIGNIFICANT CHANGE UP
EOSINOPHIL # BLD AUTO: 0.32 K/UL — SIGNIFICANT CHANGE UP (ref 0–0.7)
EOSINOPHIL NFR BLD AUTO: 3.3 % — SIGNIFICANT CHANGE UP (ref 0–8)
GLUCOSE SERPL-MCNC: 87 MG/DL — SIGNIFICANT CHANGE UP (ref 70–99)
HCT VFR BLD CALC: 26.3 % — LOW (ref 37–47)
HGB BLD-MCNC: 8.5 G/DL — LOW (ref 12–16)
IMM GRANULOCYTES NFR BLD AUTO: 1 % — HIGH (ref 0.1–0.3)
LYMPHOCYTES # BLD AUTO: 1.19 K/UL — LOW (ref 1.2–3.4)
LYMPHOCYTES # BLD AUTO: 12.2 % — LOW (ref 20.5–51.1)
MAGNESIUM SERPL-MCNC: 1.5 MG/DL — LOW (ref 1.8–2.4)
MCHC RBC-ENTMCNC: 28.1 PG — SIGNIFICANT CHANGE UP (ref 27–31)
MCHC RBC-ENTMCNC: 32.3 G/DL — SIGNIFICANT CHANGE UP (ref 32–37)
MCV RBC AUTO: 87.1 FL — SIGNIFICANT CHANGE UP (ref 81–99)
MONOCYTES # BLD AUTO: 0.58 K/UL — SIGNIFICANT CHANGE UP (ref 0.1–0.6)
MONOCYTES NFR BLD AUTO: 5.9 % — SIGNIFICANT CHANGE UP (ref 1.7–9.3)
NEUTROPHILS # BLD AUTO: 7.55 K/UL — HIGH (ref 1.4–6.5)
NEUTROPHILS NFR BLD AUTO: 77.4 % — HIGH (ref 42.2–75.2)
NRBC BLD AUTO-RTO: 0 /100 WBCS — SIGNIFICANT CHANGE UP (ref 0–0)
PHOSPHATE SERPL-MCNC: 2.4 MG/DL — SIGNIFICANT CHANGE UP (ref 2.1–4.9)
PLATELET # BLD AUTO: 245 K/UL — SIGNIFICANT CHANGE UP (ref 130–400)
PMV BLD: 10.2 FL — SIGNIFICANT CHANGE UP (ref 7.4–10.4)
POTASSIUM SERPL-MCNC: 3.3 MMOL/L — LOW (ref 3.5–5)
POTASSIUM SERPL-SCNC: 3.3 MMOL/L — LOW (ref 3.5–5)
RBC # BLD: 3.02 M/UL — LOW (ref 4.2–5.4)
RBC # FLD: 16.7 % — HIGH (ref 11.5–14.5)
SODIUM SERPL-SCNC: 144 MMOL/L — SIGNIFICANT CHANGE UP (ref 135–146)
WBC # BLD: 9.76 K/UL — SIGNIFICANT CHANGE UP (ref 4.8–10.8)
WBC # FLD AUTO: 9.76 K/UL — SIGNIFICANT CHANGE UP (ref 4.8–10.8)

## 2025-05-21 PROCEDURE — 71045 X-RAY EXAM CHEST 1 VIEW: CPT | Mod: 26

## 2025-05-21 PROCEDURE — 74018 RADEX ABDOMEN 1 VIEW: CPT | Mod: 26

## 2025-05-21 PROCEDURE — 99223 1ST HOSP IP/OBS HIGH 75: CPT | Mod: GC

## 2025-05-21 PROCEDURE — 99291 CRITICAL CARE FIRST HOUR: CPT | Mod: 24,25

## 2025-05-21 RX ORDER — MAGNESIUM SULFATE 500 MG/ML
2 SYRINGE (ML) INJECTION ONCE
Refills: 0 | Status: COMPLETED | OUTPATIENT
Start: 2025-05-21 | End: 2025-05-21

## 2025-05-21 RX ORDER — FUROSEMIDE 10 MG/ML
20 INJECTION INTRAMUSCULAR; INTRAVENOUS ONCE
Refills: 0 | Status: COMPLETED | OUTPATIENT
Start: 2025-05-21 | End: 2025-05-21

## 2025-05-21 RX ORDER — IOHEXOL 350 MG/ML
30 INJECTION, SOLUTION INTRAVENOUS ONCE
Refills: 0 | Status: COMPLETED | OUTPATIENT
Start: 2025-05-21 | End: 2025-05-21

## 2025-05-21 RX ORDER — POTASSIUM PHOSPHATE, MONOBASIC POTASSIUM PHOSPHATE, DIBASIC INJECTION, 236; 224 MG/ML; MG/ML
30 SOLUTION, CONCENTRATE INTRAVENOUS ONCE
Refills: 0 | Status: COMPLETED | OUTPATIENT
Start: 2025-05-21 | End: 2025-05-21

## 2025-05-21 RX ADMIN — NYSTATIN 1 APPLICATION(S): 100000 CREAM TOPICAL at 05:36

## 2025-05-21 RX ADMIN — MEROPENEM 100 MILLIGRAM(S): 1 INJECTION INTRAVENOUS at 21:49

## 2025-05-21 RX ADMIN — Medication 1 PUFF(S): at 02:11

## 2025-05-21 RX ADMIN — MEROPENEM 100 MILLIGRAM(S): 1 INJECTION INTRAVENOUS at 13:34

## 2025-05-21 RX ADMIN — Medication 100 MILLIGRAM(S): at 23:08

## 2025-05-21 RX ADMIN — Medication 15 MILLILITER(S): at 05:36

## 2025-05-21 RX ADMIN — Medication 1 APPLICATION(S): at 05:37

## 2025-05-21 RX ADMIN — POTASSIUM PHOSPHATE, MONOBASIC POTASSIUM PHOSPHATE, DIBASIC INJECTION, 83.33 MILLIMOLE(S): 236; 224 SOLUTION, CONCENTRATE INTRAVENOUS at 21:38

## 2025-05-21 RX ADMIN — Medication 2 PUFF(S): at 02:09

## 2025-05-21 RX ADMIN — IOHEXOL 30 MILLILITER(S): 350 INJECTION, SOLUTION INTRAVENOUS at 09:16

## 2025-05-21 RX ADMIN — ENOXAPARIN SODIUM 40 MILLIGRAM(S): 100 INJECTION SUBCUTANEOUS at 09:26

## 2025-05-21 RX ADMIN — Medication 25 GRAM(S): at 17:59

## 2025-05-21 RX ADMIN — Medication 2 PUFF(S): at 14:01

## 2025-05-21 RX ADMIN — Medication 1 PUFF(S): at 20:23

## 2025-05-21 RX ADMIN — Medication 1 PUFF(S): at 07:34

## 2025-05-21 RX ADMIN — Medication 40 MILLIGRAM(S): at 05:36

## 2025-05-21 RX ADMIN — FUROSEMIDE 20 MILLIGRAM(S): 10 INJECTION INTRAMUSCULAR; INTRAVENOUS at 09:26

## 2025-05-21 RX ADMIN — NYSTATIN 1 APPLICATION(S): 100000 CREAM TOPICAL at 17:01

## 2025-05-21 RX ADMIN — Medication 0.25 MILLIGRAM(S): at 23:00

## 2025-05-21 RX ADMIN — MEROPENEM 100 MILLIGRAM(S): 1 INJECTION INTRAVENOUS at 06:01

## 2025-05-21 RX ADMIN — Medication 0.25 MILLIGRAM(S): at 22:26

## 2025-05-21 RX ADMIN — Medication 40 MILLIGRAM(S): at 17:01

## 2025-05-21 RX ADMIN — Medication 2 PUFF(S): at 07:34

## 2025-05-21 RX ADMIN — Medication 0.25 MILLIGRAM(S): at 05:55

## 2025-05-21 RX ADMIN — Medication 100 MILLIGRAM(S): at 00:58

## 2025-05-21 RX ADMIN — Medication 2 PUFF(S): at 20:22

## 2025-05-21 RX ADMIN — Medication 1 PUFF(S): at 14:01

## 2025-05-21 RX ADMIN — Medication 100 MILLIEQUIVALENT(S): at 17:58

## 2025-05-21 NOTE — PROGRESS NOTE ADULT - SUBJECTIVE AND OBJECTIVE BOX
DARCIEPATRICIA  74y, Female  Allergy: No Known Allergies      LOS  8d    CHIEF COMPLAINT: Septic (16 May 2025 12:06)      INTERVAL EVENTS/HPI  - T(F): , Max: 97.9 (05-21-25 @ 08:00)  - WBC Count: 9.03 (05-20-25 @ 20:30)  WBC Count: 10.58 (05-20-25 @ 06:50)     - Creatinine: 0.9 (05-20-25 @ 20:30)  Creatinine: 0.9 (05-19-25 @ 21:40)     -   -   -     ROS  cannot obtain secondary to patient's sedation and/or mental status    VITALS:  T(F): 96.6, Max: 97.9 (05-21-25 @ 08:00)  HR: 70  BP: 115/71  RR: 15Vital Signs Last 24 Hrs  T(C): 35.9 (21 May 2025 12:00), Max: 36.6 (21 May 2025 08:00)  T(F): 96.6 (21 May 2025 12:00), Max: 97.9 (21 May 2025 08:00)  HR: 70 (21 May 2025 14:00) (66 - 97)  BP: 115/71 (21 May 2025 05:00) (92/50 - 122/59)  BP(mean): 84 (21 May 2025 05:00) (64 - 84)  RR: 15 (21 May 2025 14:00) (15 - 54)  SpO2: 98% (21 May 2025 14:00) (93% - 100%)    Parameters below as of 21 May 2025 14:00  Patient On (Oxygen Delivery Method): T-piece  O2 Flow (L/min): 40      PHYSICAL EXAM:  Gen: chronically ill appearing   HEENT: Normocephalic, atraumatic  Neck: supple, no lymphadenopathy  CV: Regular rate & regular rhythm  Lungs: decreased BS at bases, no fremitus  Abdomen: Soft, BS present, dressings abthera   Ext: Warm, well perfused  Neuro: non focal, not following commands  Skin: no rash, no erythema  Lines: no phlebitis     FH: Non-contributory  Social Hx: Non-contributory    TESTS & MEASUREMENTS:                        7.5    9.03  )-----------( 200      ( 20 May 2025 20:30 )             24.3     05-20    146  |  111[H]  |  23[H]  ----------------------------<  123[H]  3.6   |  24  |  0.9    Ca    10.0      20 May 2025 20:30  Phos  2.9     05-20  Mg     2.2     05-20          Urinalysis Basic - ( 20 May 2025 20:30 )    Color: x / Appearance: x / SG: x / pH: x  Gluc: 123 mg/dL / Ketone: x  / Bili: x / Urobili: x   Blood: x / Protein: x / Nitrite: x   Leuk Esterase: x / RBC: x / WBC x   Sq Epi: x / Non Sq Epi: x / Bacteria: x        Urinalysis with Rflx Culture (collected 05-13-25 @ 17:22)    Culture - Urine (collected 05-13-25 @ 17:22)  Source: Catheterized None  Final Report (05-16-25 @ 10:50):    >100,000 CFU/ml Escherichia coli ESBL  Organism: Escherichia coli ESBL (05-16-25 @ 10:50)  Organism: Escherichia coli ESBL (05-16-25 @ 10:50)      Method Type: NAGI      -  Ampicillin: R >16 These ampicillin results predict results for amoxicillin      -  Ampicillin/Sulbactam: S <=4/2      -  Aztreonam: R 8      -  Cefazolin: R >16 For uncomplicated UTI with K. pneumoniae, E. coli, or P. mirablis: NAGI <=16 is sensitive and NAGI >=32 is resistant. This also predicts results for oral agents cefaclor, cefdinir, cefpodoxime, cefprozil, cefuroxime axetil, cephalexin and locarbef for uncomplicated UTI. Note that some isolates may be susceptible to these agents while testing resistant to cefazolin.      -  Cefepime: R >16      -  Ceftriaxone: R >32      -  Cefuroxime: R >16      -  Ciprofloxacin: R >2      -  Ertapenem: S <=0.5      -  Gentamicin: S <=2      -  Imipenem: S <=1      -  Levofloxacin: R >4      -  Meropenem: S <=1      -  Nitrofurantoin: S <=32 Should not be used to treat pyelonephritis      -  Piperacillin/Tazobactam: S <=8      -  Tigecycline: S <=2 Interpretations based on FDA breakpoints      -  Tobramycin: S <=2      -  Trimethoprim/Sulfamethoxazole: R >2/38    Culture - Blood (collected 05-13-25 @ 15:45)  Source: Blood Blood-Peripheral  Final Report (05-18-25 @ 23:00):    No growth at 5 days    Culture - Blood (collected 05-13-25 @ 15:35)  Source: Blood Blood-Peripheral  Final Report (05-18-25 @ 23:00):    No growth at 5 days            INFECTIOUS DISEASES TESTING  MRSA PCR Result.: Negative (05-14-25 @ 07:20)  MRSA PCR Result.: Negative (04-14-25 @ 17:16)  Procalcitonin: 5.22 (11-05-24 @ 19:32)  MRSA PCR Result.: Negative (11-04-24 @ 13:15)      INFLAMMATORY MARKERS  Sedimentation Rate, Erythrocyte: 45 mm/hr (05-14-25 @ 22:15)  C-Reactive Protein: 154.6 mg/L (05-14-25 @ 22:15)      RADIOLOGY & ADDITIONAL TESTS:  I have personally reviewed the last available Chest xray  CXR      CT  CT Abdomen and Pelvis w/ Oral Cont and w/ IV Cont:   ACC: [id number] EXAM:  CT ABDOMEN AND PELVIS OC IC   ORDERED BY: DAISY TIAN     ACC: [id number] EXAM:  CT CHEST IC   ORDERED BY: DAISY TIAN     *** ADDENDUM # 1 ***    Inflammatory changes inferior to the left lobe of the liver are   nonspecific and may reflect postoperative in etiology as well as   inflammatory or infectious etiology. Biliary leak cannot be excluded   either. If clinically warranted, nuclear medicine HIDA scan (biliary leak   study) may be of benefit.    Small perihepatic and perisplenic ascites as well as anasarca.    --- End of Report ---    *** END OF ADDENDUM # 1 ***      PROCEDURE DATE:  05/19/2025          INTERPRETATION:  CLINICAL INFORMATION: Respiratory abnormality and post   exploratory laparotomy and distal antrectomy    COMPARISON: 1/31/2023 chest CT and abdominal and pelvic CT dated 5/13/2025    CONTRAST/COMPLICATIONS:  IV Contrast: Omnipaque 350 (accession [id number]), IV contrast documented   in unlinked concurrent exam (accession [id number])  100 cc administered   0   cc discarded  Oral Contrast: Omnipaque 300 (accession [id number]), NONE (accession   [id number])  .    PROCEDURE:  CT of the Chest, Abdomen and Pelvis was performed.  Sagittal and coronal reformats were performed.    FINDINGS:  CHEST:  LUNGS, PLEURA, AIRWAYS: Bilateral small pleural effusions with associated   lower lobe compressive atelectasis. Superimposed infectious process is   not excluded. Partial obstruction of the distal right lower lobe   bronchioles, similar to the prior exam. Otherwise no evidence of central   endobronchial obstruction. Tracheostomy tube in place. No focal   consolidation. No pneumothorax.    THORACIC NODES: Left periaortic lymph node measuring up to 1 x 0.8 cm   (6/35), previously measuring up to 1.4 x 1.1 cm. No supraclavicular or   axillary lymphadenopathy.    MEDIASTINUM/GREAT VESSELS: Cardiomegaly. No pericardial effusion. Dilated   main pulmonary artery measures up to 3.7 cm. The aorta is of normal   caliber. Coronary atherosclerosis. Feeding tube looping in the stomach.    ABDOMEN AND PELVIS:  LIVER: Periportal edema.  BILE DUCTS: Normal caliber.  GALLBLADDER: Status post cholecystectomy.  SPLEEN: Within normal limits.  PANCREAS: Inflammatory changes are noted in the region of the pancreatic   neck inferior to the left lobe of the liver compatible with region of   prior surgery and prior inflammation. No drainable collection is seen in   this region.  ADRENALS: Unchanged bilateral adrenal gland nodules, left greater than   right.  KIDNEYS/URETERS: Atrophic left kidney. No hydronephrosis. Unremarkable   right kidney.    BLADDER: Urinary bladder is decompressed with Burger catheter balloon in   place.  REPRODUCTIVE ORGANS: Calcified uterine fibroid.    BOWEL: Post antrectomy and gastrojejunostomy.  PERITONEUM/RETROPERITONEUM: No focal drainable fluid collection.   Percutaneous drain courses through the right upper quadrant inferior to   the left lobe of the liver. No drainable collection is seen. Trace amount   of presacral fluid.  VESSELS: Scattered atherosclerotic vascular calcifications. 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  LYMPH NODES: No enlarged abdominal or pelvic lymph nodes.  ABDOMINAL WALL: Postoperative changes  BONES: Degenerative changes of the spine. No acute osseous abnormality.      IMPRESSION:      Post distal antrectomy and gastrojejunostomy without evidence of   drainable fluid collection or contrast extravasation.    Percutaneous drainage catheter terminating in the region of the right   upper quadrant inferior to the liver with nearby inflammatory changes. No   evidence of drainable fluid collection    Bilateral small pleural effusions with compressive atelectasis.   Superimposed infectious process is not excluded.    --- End of Report ---    ***Please see the addendum at the top of this report. It may contain   additional important information or changes.****        CHARANJIT RANGEL MD; Attending Radiologist  This document has been electronically signed. May 19 2025 10:01PM  1st Addendum: CHARANJIT RANGEL MD; Attending Radiologist  The first addendum was electronically signed on: May 19 2025 10:24PM. (05-19-25 @ 18:30)      CARDIOLOGY TESTING  12 Lead ECG:   Ventricular Rate 83 BPM    QRS Duration 104 ms    Q-T Interval 380 ms    QTC Calculation(Bazett) 446 ms    R Axis -22 degrees    T Axis 28 degrees    Diagnosis Line Atrial fibrillation  Low voltage QRS  Septal infarct , age undetermined  Abnormal ECG    Confirmed by Sanjeev Pierce ([id number]) on 5/14/2025 6:59:31 PM (05-13-25 @ 23:32)  12 Lead ECG:   Ventricular Rate 82 BPM    QRS Duration 104 ms    Q-T Interval 396 ms    QTC Calculation(Bazett) 462 ms    R Axis 144 degrees    T Axis 33 degrees    Diagnosis Line Atrial fibrillation  Right axis deviation  Low voltage QRS  Abnormal ECG    Confirmed by Sanjeev Pierce ([id number]) on 5/14/2025 6:56:22 PM (05-13-25 @ 17:11)      MEDICATIONS  acetaminophen   IVPB .. 1000  albuterol    90 MICROgram(s) HFA Inhaler 2  chlorhexidine 2% Cloths 1  dextrose 5% + sodium chloride 0.45%. 1000  enoxaparin Injectable 40  fluconAZOLE IVPB 200  ipratropium 17 MICROgram(s) HFA Inhaler 1  meropenem  IVPB 1000  nystatin Powder 1  pantoprazole  Injectable 40  sodium chloride 3%  Inhalation 4      WEIGHT  Weight (kg): 89 (05-20-25 @ 05:00)  Creatinine: 0.9 mg/dL (05-20-25 @ 20:30)      ANTIBIOTICS:  fluconAZOLE IVPB 200 milliGRAM(s) IV Intermittent every 24 hours  meropenem  IVPB 1000 milliGRAM(s) IV Intermittent every 8 hours      All available historical records have been reviewed

## 2025-05-21 NOTE — PROGRESS NOTE ADULT - ASSESSMENT
ASSESSMENT  81F PMHx HTN, a-fib (on xarelto), solitary kidney, DM, GERD, prior open cholecystectomy c/b suture granulomas s/p 2019 abdominal wall debridements by Dr. Banda, respiratory distress with prolonged ventilation during 11/2024 admission, s/p 11/19 tracheostomy and gastrostomy and feeding jejunostomy tube by Dr. Champion, ya catheter dependence who presented to the ED from NH on 5/13 with 4 days progressively worsening abdominal pain. Surgery consulted for imaging concerning for duodenal perforation. S/p OR, ID consulted for antimicrobial management       IMPRESSION  #Concern for anastomotic leak  < from: CT Abdomen and Pelvis w/ Oral Cont and w/ IV Cont (05.19.25 @ 18:30) >  Inflammatory changes inferior to the left lobe of the liver are nonspecific and may reflect postoperative in etiology as well as   inflammatory or infectious etiology. Biliary leak cannot be excluded either. If clinically warranted, nuclear medicine HIDA scan (biliary leak   study) may be of benefit.  Small perihepatic and perisplenic ascites as well as anasa Post distal antrectomy and gastrojejunostomy without evidence of   drainable fluid collection or contrast extravasation.  Percutaneous drainage catheter terminating in the region of the right upper quadrant inferior to the liver with nearby inflammatory changes. No   evidence of drainable fluid collection  Bilateral small pleural effusions with compressive atelectasis. Superimposed infectious process is not excluded.  #Perforated Duodenal Ulcer, septic shock requiring pressors     Afebrile; Admission WBC 18    5/13 BCX NGTD     5/13 UCX   >100,000 CFU/ml Escherichia coli ESBL S zosyn  Procedure	PROCEDURES:  Partial antrectomy 14-May-2025 03:37:14  Carlos Cheatham  Operative Findings	prepyloric ulcer 4 cm perforation anterior stomach s/p antrectomy, D1 stapled off due to friable tissue to ensure healthy tissue for staple line. Temporary abdominal closure due to HD instability.  < from: CT Abdomen and Pelvis w/ IV Cont (05.13.25 @ 18:16) >  *1. Since November 8, 2024, new moderate mural thickening and inflammation involving the proximal proximal duodenal wall, with focal   defect/outpouching along lateral duodenum, compatible with perforated duodenal ulcer; no focal drainable fluid collection.  2. Additional chronic/incidental findings, as detailed above.  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

## 2025-05-21 NOTE — CONSULT NOTE ADULT - ATTENDING COMMENTS
82yo female with recent ex lap and bilroth II procedure for perforated ulcer asked to evaluate for GJ replacement. Pt also with possible anastomotic leak. Continue supportive measures, monitor vik drain output, serial abd exams. Will plan for GJ replacement with advanced GI when further optimized. Further recommendations per surgery team.
Discussed case with ED team.  Reviewed imaging and lab work independently.    Visited pt at bedside in ED. Patient non-verbal at baseline but able to communicate with hand gestures and head movements. Reports having abdominal pain. Endorses pain present for past 4 days. Denies any vomiting. Reports pain has progressively increased in intensity.    PE:  General; female, in bed, tired appearing, in distress  Head; NC/AT, trach in place  Heart: RRR  Lungs; even chest rise, no accessory muscle use  Abdomen: soft, TTP epigastric and RUQ regions, non-peritoneal, GJ tube in place, previous scar tissue appreciated right al-abdomen    A/P:  74 F with perforated ulcer, contained, on CT imaging with abdominal pain, leukocytosis, hypotension concerning for sepsis.   - Patient gave consent to discuss case with her daughter Jewell; discussion had with patient and her daughter. Discussed risks of proceeding to the OR for an exploratory laparotomy to evaluate area of concern, risks included bleeding, infection, damage to surrounding organs, possibility of dying during procedure, daughter and patient wish to proceed to the OR. Medical management was also discussed with patient and daughter, but there is the concern of patient deteriorating clinically further if managed medically only,  - To proceed to OR as a level 1  - NPO  - Reversal of a/c with PCC  - PRBCS of hold  - Has chronic Burger; monitor urine output  - Broad spectrum abx  - IVF hydration  - Home medication reconciliation
75 yo female PMH HTN, a-fib (on xarelto), solitary kidney, DM, GERD, prior open cholecystectomy c/b suture granulomas s/p 2019 abdominal wall debridements by Dr. Banda, respiratory distress with prolonged ventilation during 11/2024 admission, s/p 11/19 tracheostomy and gastrostomy and feeding jejunostomy tube by Dr. Champion, ya catheter dependence who presented from the nursing home with abdominal pain and was admitted to SICU for perforation of prepyloric ulcer, now s/p ex-lap with antrectomy and D1 stapling. Currently in septic shock in the SICU on levo and vaso. Also has covid and positive Urine Culture. Being treated with zosyn, caspo and RDV. Cardio is consulted for concerning ECG with afib and PVCs and concern for VT overnight on tele. Patient is minimally verbal and a poor historian. Home cardiac meds are xarelto and losartan 100 which are currently being held. Repeat echo - severe LV dysfunction, EF 30%    #Septic Shock- Perforated gastric ulcer, Covid, Positive Urine Culture  #Chronic Afib w PVCs  #NSVT  #HFrEF with multiple RWMA- most likely sepsis-induced stress cardiomyopathy, less likely to be ischemic  #HTN  -Tele Reviewed, shows rate-controlled afib and PVCs and 2 episodes of NSVT  -ECG shows rate-controlled afib and PVCs and is similar to prior ECGs  -MNGMT of septic shock per SICU  - antibiotic therapy and wound care as per primary team  - c/w vent support, hemodynamic support.   -Start short-acting AC for afib  -Once sepsis is improved and off pressors, will start beta blockers for stress cardiomyopathy  -For NSVT, continue to monitor on tele, and start beta blockers when no longer septic. If episodes of sustained VT, start amiodarone gtt

## 2025-05-21 NOTE — PROGRESS NOTE ADULT - ASSESSMENT
74F found to have perforated peptic ulcer now s/p ex lap, distal antrectomy, abthera placement requiring vasopressors. RTOR 5/16 s/p second look laparotomy, Billroth II reconstruction, gastrojejunostomy tube replaced with a jejunal feeding tube, abdomen closed at the end of the case.     NEUROLOGICAL:  #Acute pain   - IV APAP while NPO   - oxycodone 2.5mg prn moderate pain, holding    - oxycodone 5mg prn severe pain, holding     RESPIRATORY:   #Respiratory failure, s/p tracheostomy (11/24)    - now with 8 portex cuffed trach (switched in OR on 5/14)    - toelrating T- piece x 24 hrs (5/17)    - continue duonebs   #Decreased small bibasilar opacities/atelectasis, right greater than left on CT  - 5/16: s/p deep suctioning by anesthesia w/ copious secretions   #h/o asthma  - atrovent and albuterol   #Activity   - increase as tolerated     CARDS:   #Nonsustained Vtach - self resolved   - cards consult: management of septic shock. Start short acting AC for afib. Start beta blockers once off pressors   #hypotension 2/2 sepsis   - off pressors 5/19  - MAP >65  - Holding midodrine 10 mg Q8h while NPO   #hx afib  - transiently was in AFIB slow ventricular response (HR 40s-60s), remains on low dose pressors   - holding home Xarelto 15 QD  #hx HTN  - holding home losartan 100QD  #elevated troponin  - 143 > 84   - no longer trending  #TTE 11/24: EF 71%. Mild-mod AS. Trivial pericardial effusion.   - Echo 5/15: EF 30-35%, multiple left ventricular motion wall abnormalities. Possible Takotsubo vs ischemic. Global left ventricular systolic function.     GASTROINTESTINAL/NUTRITION:   #perforated prepyloric gastric ulcer s/p exploratory laparotomy antrectomy and D1 stapled off with temporary abdominal closure and abthera in place  - 5/16: RTOR s/p bilroth 2, J tube placement, RLQ maliha drain by anastomosis extending to duodenal stump  - 5/18: BULMARO drain now w/ bilious output > pt made strict NPO, f/u drain creatinine/bilirubin composition, LFTs WNL   #Diet, NPO strict    - Trickle feeds in NGT, made NPO  - aspiration precautions, HOB 30  #GI Prophylaxis    - protonix 40mg bid IV  #Bowel regimen    -holding    -last bowel movement 5/17    /RENAL:   #urine output in critically ill    -chronic indwelling ya > new ya exchanged 5/17  #Maintain euvolemia    -D51/2 NS @ 50cc/hr while NPO  #metabolic acidosis, resolved  - S/p Lasix 20mg x1 5/20  #NOLA (baseline creatine 0.6), improving   - nephrology consulted > no RRT at this time  #oliguria  - intermittent albumin     #Hypokalemia, hypophosphatemia - repleted     Labs:          BUN/Cr- 29/0.9  -->,  23/0.9  -->          [05-20 @ 20:30]Na  146 // K  3.6 // Mg  2.2 // Phos  2.9    HEME/ONC:   #DVT prophylaxis    -LVX    -SCDs  #anemia  - 1pRBC 5/17 to help wean off vasopressors    Labs: Hb/Hct:  7.9/23.6  -->,  7.5/24.3  -->                      Plts:  207  -->,  200  -->                 PTT/INR:        ID:  WBC- 9.64  --->>,  10.58  --->>,  9.03  --->>  Temp trend- 24hrs T(F): 96.6 (05-20 @ 23:47), Max: 97 (05-20 @ 16:00)    #perforated duodenal ulcer  - zosyn (renal dosing) (5/14 - 5/16)  - Meropenem (5/16 - )  - caspofungin per primary team (5/14 - 5/20)  - Fluconazole 200mg q24  - Nystatin powder for groin rash (5/15 - )  #COVID positive on admission    -  completed remdesivir 2 day course  #Cultures    - 5/13: blood culture: NGTD    - 5/13: blood culture: NGTD    - 5/13: urine culture: E Coli  - ID following    ENDOCRINE:  #glycemic monitoring    -D51/2 NS for persistent hypoglycemia     -FSG q6 while NPO    -Glucose goal 140-180. If above 180, will start corrective insulin sliding scale  #hypercalcemia  -  home cinacalcet 30mg bid, must be crushed well to not clog NGT - holding while NPO     MSK:  #Activity - increase as tolerated   #hx osteoporosis  - holding home Alendronate 70mg weekly on Thursdays while NPO    SKIN:  #DTI screening negative 5/20    - will continue to monitor daily skin changes      PALLIATIVE:  Currently full code, palliative consult       LINES/DRAINS:  PIV, Ya (replaced 5/17), J tube (5/16), 8 cuffed portex trach, R radial arterial line (5/14- ), LIJ CVC (5/14- ), IR consult for PICC  ADVANCED DIRECTIVES:  Full Code    HCP/Emergency Contact- daughter Cecilia [phone]  INDICATION FOR SICU/SDU:  perforated peptic ulcer; mechanical ventilation/vasopressors     DISPO:  SICU. Case to be discussed with attending Dr. Pedersen   74F found to have perforated peptic ulcer now s/p ex lap, distal antrectomy, abthera placement requiring vasopressors. RTOR 5/16 s/p second look laparotomy, Billroth II reconstruction, gastrojejunostomy tube replaced with a jejunal feeding tube, abdomen closed at the end of the case.     NEUROLOGICAL:  #Acute pain   - IV APAP while NPO   - oxycodone 2.5mg prn moderate pain, holding    - oxycodone 5mg prn severe pain, holding     RESPIRATORY:   #Respiratory failure, s/p tracheostomy (11/24)    - now with 8 portex cuffed trach (switched in OR on 5/14)    - tolerating T- piece     - continue duonebs   #Decreased small bibasilar opacities/atelectasis, right greater than left on CT  - 5/16: s/p deep suctioning by anesthesia w/ copious secretions   #h/o asthma  - atrovent and albuterol   #Activity   - increase as tolerated     CARDS:   #Nonsustained Vtach - self resolved   - cards consult: management of septic shock. Start short acting AC for afib. Start beta blockers once off pressors   #hypotension 2/2 sepsis   - off pressors 5/19  - MAP >65  - Holding midodrine 10 mg Q8h while NPO   #hx afib  - transiently was in AFIB slow ventricular response (HR 40s-60s), remains on low dose pressors   - holding home Xarelto 15 QD  #hx HTN  - holding home losartan 100QD  #elevated troponin  - 143 > 84   - no longer trending  #TTE 11/24: EF 71%. Mild-mod AS. Trivial pericardial effusion.   - Echo 5/15: EF 30-35%, multiple left ventricular motion wall abnormalities. Possible Takotsubo vs ischemic. Global left ventricular systolic function.     GASTROINTESTINAL/NUTRITION:   #perforated prepyloric gastric ulcer s/p exploratory laparotomy antrectomy and D1 stapled off with temporary abdominal closure and abthera in place  - 5/16: RTOR s/p bilroth 2, J tube placement, RLQ maliha drain by anastomosis extending to duodenal stump  - 5/18: BULMARO drain now w/ bilious output > pt made strict NPO, f/u drain creatinine/bilirubin composition, LFTs WNL   #Diet, NPO strict    - Trickle feeds in NGT, made NPO  - aspiration precautions, HOB 30  #GI Prophylaxis    - protonix 40mg bid IV  #Bowel regimen    -holding    -last bowel movement 5/17    /RENAL:   #urine output in critically ill    -chronic indwelling ya > new ya exchanged 5/17  #Maintain euvolemia    -D51/2 NS @ 50cc/hr while NPO  #metabolic acidosis, resolved  - S/p Lasix 20mg x1 5/20  #NOLA (baseline creatine 0.6), improving   - nephrology consulted > no RRT at this time  #oliguria  - intermittent albumin     #Hypokalemia, hypophosphatemia - repleted     Labs:          BUN/Cr- 29/0.9  -->,  23/0.9  -->          [05-20 @ 20:30]Na  146 // K  3.6 // Mg  2.2 // Phos  2.9    HEME/ONC:   #DVT prophylaxis    -LVX    -SCDs  #anemia  - 1pRBC 5/17 to help wean off vasopressors    Labs: Hb/Hct:  7.9/23.6  -->,  7.5/24.3  -->                      Plts:  207  -->,  200  -->                 PTT/INR:        ID:  WBC- 9.64  --->>,  10.58  --->>,  9.03  --->>  Temp trend- 24hrs T(F): 96.6 (05-20 @ 23:47), Max: 97 (05-20 @ 16:00)    #perforated duodenal ulcer  - zosyn (renal dosing) (5/14 - 5/16)  - Meropenem (5/16 - )  - caspofungin per primary team (5/14 - 5/20)  - Fluconazole 200mg q24  - Nystatin powder for groin rash (5/15 - )  #COVID positive on admission    -  completed remdesivir 2 day course  #Cultures    - 5/13: blood culture: NGTD    - 5/13: blood culture: NGTD    - 5/13: urine culture: E Coli  - ID following    ENDOCRINE:  #glycemic monitoring    -D51/2 NS for persistent hypoglycemia     -FSG q6 while NPO    -Glucose goal 140-180. If above 180, will start corrective insulin sliding scale  #hypercalcemia  -  home cinacalcet 30mg bid, must be crushed well to not clog NGT - holding while NPO     MSK:  #Activity - increase as tolerated   #hx osteoporosis  - holding home Alendronate 70mg weekly on Thursdays while NPO    SKIN:  #DTI screening negative 5/20    - will continue to monitor daily skin changes      PALLIATIVE:  Currently full code, palliative consult       LINES/DRAINS:  PIV, Ya (replaced 5/17), J tube (5/16), 8 cuffed portex trach, R radial arterial line (5/14- ), LIJ CVC (5/14- ), IR consult for PICC  ADVANCED DIRECTIVES:  Full Code    HCP/Emergency Contact- daughter Cecilia [phone]  INDICATION FOR SICU/SDU:  perforated peptic ulcer; mechanical ventilation/vasopressors     DISPO:  SICU. Case to be discussed with attending Dr. Pedersen

## 2025-05-21 NOTE — CONSULT NOTE ADULT - SUBJECTIVE AND OBJECTIVE BOX
Gastroenterology Consultation:    Patient is a 74y old  Female who presents with a chief complaint of Septic (16 May 2025 12:06)        Admitted on: 05-13-25      HPI:  Pt is 81F PMHx HTN, a-fib (on xarelto), solitary kidney, DM, GERD, prior open cholecystectomy c/b suture granulomas s/p 2019 abdominal wall debridements by Dr. Banda, respiratory distress with prolonged ventilation during 11/2024 admission, s/p 11/19 tracheostomy and gastrostomy and feeding jejunostomy tube by Dr. Champion, ya catheter dependence who presented to the ED from NH on 5/13 with 4 days progressively worsening abdominal pain. Surgery consulted for imaging concerning for duodenal perforation. History limited by pt's minimally-verbal status. Spoke with daughter on phone, pt is full code at this time. S/p EX LAP AND ANTRECTOMY, ABD LEFT OPEN W/ ABTHERA and then 5/17 S/P 2ND LOOK LAP, BILROTH 2, PLACEMENT OF J tube. GI consulted for replacement of J tube with GJ tube. KUB noted to have J tube coiled in stomach. CTAP on 5/19 showing  Inflammatory changes inferior to the left lobe of the liver are nonspecific and may reflect postoperative in etiology as well as inflammatory or infectious etiology. Biliary leak cannot be excluded either.        PAST MEDICAL & SURGICAL HISTORY:  HTN (hypertension)      Diabetes mellitus      Obesity      Gastroesophageal reflux disease      Active asthma      Generalized OA      Afib      H/O hernia repair  2011 and revised in 2013      History of cholecystectomy      Status post debridement      H/O tracheostomy      S/P gastrostomy      S/P jejunostomy            FAMILY HISTORY:  unable to obtain    Social History:  unable to obtain    Home Medications:  albuterol 2.5 mg/3 mL (0.083%) inhalation solution: 3 milliliter(s) by nebulizer every 6 hours as needed for  shortness of breath and/or wheezing (13 May 2025 22:28)  alendronate 70 mg oral tablet: 1 tab(s) orally once a week 5 AM on Thursday (13 May 2025 21:20)  cholecalciferol 400 intl units (10 mcg) oral tablet: 1 tab(s) orally once a day (13 May 2025 22:28)  ipratropium: 1 unit(s) by nebulizer every 6 hours (ipratropium bromide inhalation 0.02% solution) (13 May 2025 22:28)  losartan 100 mg oral tablet: 1 tab(s) orally once a day (13 May 2025 21:29)  Multiple Vitamins with Minerals oral liquid: 10 milliliter(s) by jejunostomy tube once a day (13 May 2025 21:29)  rivaroxaban 15 mg oral tablet: 1 tab(s) orally once a day (at bedtime) (13 May 2025 21:17)  Sensipar 30 mg oral tablet: 1 tab(s) orally every 12 hours (13 May 2025 22:28)        MEDICATIONS  (STANDING):  acetaminophen   IVPB .. 1000 milliGRAM(s) IV Intermittent once  albuterol    90 MICROgram(s) HFA Inhaler 2 Puff(s) Inhalation every 6 hours  chlorhexidine 2% Cloths 1 Application(s) Topical <User Schedule>  dextrose 5% + sodium chloride 0.45%. 1000 milliLiter(s) (50 mL/Hr) IV Continuous <Continuous>  enoxaparin Injectable 40 milliGRAM(s) SubCutaneous every 24 hours  fluconAZOLE IVPB 200 milliGRAM(s) IV Intermittent every 24 hours  ipratropium 17 MICROgram(s) HFA Inhaler 1 Puff(s) Inhalation every 6 hours  meropenem  IVPB 1000 milliGRAM(s) IV Intermittent every 8 hours  nystatin Powder 1 Application(s) Topical two times a day  pantoprazole  Injectable 40 milliGRAM(s) IV Push every 12 hours  potassium phosphate IVPB 30 milliMole(s) IV Intermittent once  sodium chloride 3%  Inhalation 4 milliLiter(s) Inhalation every 12 hours    MEDICATIONS  (PRN):  HYDROmorphone  Injectable 0.25 milliGRAM(s) IV Push every 6 hours PRN Severe Pain (7 - 10)  sodium chloride 0.9% lock flush 10 milliLiter(s) IV Push every 1 hour PRN Pre/post blood products, medications, blood draw, and to maintain line patency      Allergies  No Known Allergies      Review of Systems:   unable to obtain        Physical Examination:  T(C): 35.9 (05-21-25 @ 16:00), Max: 36.6 (05-21-25 @ 08:00)  HR: 88 (05-21-25 @ 18:00) (68 - 97)  BP: 115/71 (05-21-25 @ 05:00) (93/64 - 122/59)  RR: 19 (05-21-25 @ 18:00) (12 - 54)  SpO2: 98% (05-21-25 @ 18:00) (93% - 100%)      05-19-25 @ 07:01  -  05-20-25 @ 07:00  --------------------------------------------------------  IN: 1954 mL / OUT: 1310 mL / NET: 644 mL    05-20-25 @ 07:01  -  05-21-25 @ 07:00  --------------------------------------------------------  IN: 920 mL / OUT: 2980 mL / NET: -2060 mL    05-21-25 @ 07:01  -  05-21-25 @ 18:23  --------------------------------------------------------  IN: 750 mL / OUT: 1985 mL / NET: -1235 mL          GENERAL: AAOx3, no acute distress.  HEAD:  Atraumatic, Normocephalic  EYES: conjunctiva and sclera clear  NECK: Supple, no JVD or thyromegaly  CHEST/LUNG: Clear to auscultation bilaterally; No wheeze, rhonchi, or rales  HEART: Regular rate and rhythm; normal S1, S2, No murmurs.  ABDOMEN: Soft, nontender, nondistended; Bowel sounds present  NEUROLOGY: No asterixis or tremor.   SKIN: Intact, no jaundice        Data:                        7.5    9.03  )-----------( 200      ( 20 May 2025 20:30 )             24.3     Hgb Trend:  7.5  05-20-25 @ 20:30  7.9  05-20-25 @ 06:50  7.1  05-19-25 @ 21:40  7.8  05-18-25 @ 23:22        05-21    144  |  111[H]  |  20  ----------------------------<  87  3.3[L]   |  24  |  0.8    Ca    10.0      21 May 2025 17:01  Phos  2.4     05-21  Mg     1.5     05-21      Liver panel trend:  TBili 0.4   /   AST 11   /   ALT 9   /   AlkP 82   /   Tptn 4.2   /   Alb 2.4    /   DBili 0.2      05-18  TBili 0.3   /   AST 11   /   ALT 9   /   AlkP 75   /   Tptn 4.0   /   Alb 2.3    /   DBili 0.2      05-16  TBili 0.5   /   AST 20   /   ALT 16   /   AlkP 100   /   Tptn 4.9   /   Alb 2.7    /   DBili 0.2      05-14  TBili 0.5   /   AST 35   /   ALT 18   /   AlkP 114   /   Tptn 4.4   /   Alb 2.4    /   DBili 0.3      05-14  TBili 0.7   /   AST 15   /   ALT 10   /   AlkP 120   /   Tptn 5.8   /   Alb 3.1    /   DBili --      05-13              Radiology:       Gastroenterology Consultation:    Patient is a 74y old  Female who presents with a chief complaint of Septic (16 May 2025 12:06)        Admitted on: 05-13-25      HPI:  Pt is 81F PMHx HTN, a-fib (on xarelto), solitary kidney, DM, GERD, prior open cholecystectomy c/b suture granulomas s/p 2019 abdominal wall debridements by Dr. Banda, respiratory distress with prolonged ventilation during 11/2024 admission, s/p 11/19 tracheostomy and gastrostomy and feeding jejunostomy tube by Dr. Champion, ya catheter dependence who presented to the ED from NH on 5/13 with 4 days progressively worsening abdominal pain. Surgery consulted for imaging concerning for duodenal perforation. History limited by pt's minimally-verbal status. Spoke with daughter on phone, pt is full code at this time. S/p EX LAP AND ANTRECTOMY, ABD LEFT OPEN W/ ABTHERA and then 5/17 S/P 2ND LOOK LAP, BILROTH 2, PLACEMENT OF J tube. GI consulted for replacement of J tube with GJ tube. KUB noted to have J tube coiled in stomach. CTAP on 5/19 showing  Inflammatory changes inferior to the left lobe of the liver are nonspecific and may reflect postoperative in etiology as well as inflammatory or infectious etiology. Biliary leak cannot be excluded either.        PAST MEDICAL & SURGICAL HISTORY:  HTN (hypertension)      Diabetes mellitus      Obesity      Gastroesophageal reflux disease      Active asthma      Generalized OA      Afib      H/O hernia repair  2011 and revised in 2013      History of cholecystectomy      Status post debridement      H/O tracheostomy      S/P gastrostomy      S/P jejunostomy            FAMILY HISTORY:  unable to obtain    Social History:  unable to obtain    Home Medications:  albuterol 2.5 mg/3 mL (0.083%) inhalation solution: 3 milliliter(s) by nebulizer every 6 hours as needed for  shortness of breath and/or wheezing (13 May 2025 22:28)  alendronate 70 mg oral tablet: 1 tab(s) orally once a week 5 AM on Thursday (13 May 2025 21:20)  cholecalciferol 400 intl units (10 mcg) oral tablet: 1 tab(s) orally once a day (13 May 2025 22:28)  ipratropium: 1 unit(s) by nebulizer every 6 hours (ipratropium bromide inhalation 0.02% solution) (13 May 2025 22:28)  losartan 100 mg oral tablet: 1 tab(s) orally once a day (13 May 2025 21:29)  Multiple Vitamins with Minerals oral liquid: 10 milliliter(s) by jejunostomy tube once a day (13 May 2025 21:29)  rivaroxaban 15 mg oral tablet: 1 tab(s) orally once a day (at bedtime) (13 May 2025 21:17)  Sensipar 30 mg oral tablet: 1 tab(s) orally every 12 hours (13 May 2025 22:28)        MEDICATIONS  (STANDING):  acetaminophen   IVPB .. 1000 milliGRAM(s) IV Intermittent once  albuterol    90 MICROgram(s) HFA Inhaler 2 Puff(s) Inhalation every 6 hours  chlorhexidine 2% Cloths 1 Application(s) Topical <User Schedule>  dextrose 5% + sodium chloride 0.45%. 1000 milliLiter(s) (50 mL/Hr) IV Continuous <Continuous>  enoxaparin Injectable 40 milliGRAM(s) SubCutaneous every 24 hours  fluconAZOLE IVPB 200 milliGRAM(s) IV Intermittent every 24 hours  ipratropium 17 MICROgram(s) HFA Inhaler 1 Puff(s) Inhalation every 6 hours  meropenem  IVPB 1000 milliGRAM(s) IV Intermittent every 8 hours  nystatin Powder 1 Application(s) Topical two times a day  pantoprazole  Injectable 40 milliGRAM(s) IV Push every 12 hours  potassium phosphate IVPB 30 milliMole(s) IV Intermittent once  sodium chloride 3%  Inhalation 4 milliLiter(s) Inhalation every 12 hours    MEDICATIONS  (PRN):  HYDROmorphone  Injectable 0.25 milliGRAM(s) IV Push every 6 hours PRN Severe Pain (7 - 10)  sodium chloride 0.9% lock flush 10 milliLiter(s) IV Push every 1 hour PRN Pre/post blood products, medications, blood draw, and to maintain line patency      Allergies  No Known Allergies      Review of Systems:   unable to obtain        Physical Examination:  T(C): 35.9 (05-21-25 @ 16:00), Max: 36.6 (05-21-25 @ 08:00)  HR: 88 (05-21-25 @ 18:00) (68 - 97)  BP: 115/71 (05-21-25 @ 05:00) (93/64 - 122/59)  RR: 19 (05-21-25 @ 18:00) (12 - 54)  SpO2: 98% (05-21-25 @ 18:00) (93% - 100%)      05-19-25 @ 07:01  -  05-20-25 @ 07:00  --------------------------------------------------------  IN: 1954 mL / OUT: 1310 mL / NET: 644 mL    05-20-25 @ 07:01  -  05-21-25 @ 07:00  --------------------------------------------------------  IN: 920 mL / OUT: 2980 mL / NET: -2060 mL    05-21-25 @ 07:01  -  05-21-25 @ 18:23  --------------------------------------------------------  IN: 750 mL / OUT: 1985 mL / NET: -1235 mL          GENERAL: no acute distress.  HEAD:  Atraumatic, Normocephalic  EYES: conjunctiva and sclera clear  NECK: Supple, no JVD or thyromegaly  CHEST/LUNG: Clear to auscultation bilaterally; No wheeze, rhonchi, or rales  HEART: Regular rate and rhythm; normal S1, S2, No murmurs.  ABDOMEN: Soft, nontender, nondistended; Bowel sounds present, +gastrostomy, +BULMARO drain  NEUROLOGY: No asterixis or tremor.   SKIN: Intact, no jaundice        Data:                        7.5    9.03  )-----------( 200      ( 20 May 2025 20:30 )             24.3     Hgb Trend:  7.5  05-20-25 @ 20:30  7.9  05-20-25 @ 06:50  7.1  05-19-25 @ 21:40  7.8  05-18-25 @ 23:22        05-21    144  |  111[H]  |  20  ----------------------------<  87  3.3[L]   |  24  |  0.8    Ca    10.0      21 May 2025 17:01  Phos  2.4     05-21  Mg     1.5     05-21      Liver panel trend:  TBili 0.4   /   AST 11   /   ALT 9   /   AlkP 82   /   Tptn 4.2   /   Alb 2.4    /   DBili 0.2      05-18  TBili 0.3   /   AST 11   /   ALT 9   /   AlkP 75   /   Tptn 4.0   /   Alb 2.3    /   DBili 0.2      05-16  TBili 0.5   /   AST 20   /   ALT 16   /   AlkP 100   /   Tptn 4.9   /   Alb 2.7    /   DBili 0.2      05-14  TBili 0.5   /   AST 35   /   ALT 18   /   AlkP 114   /   Tptn 4.4   /   Alb 2.4    /   DBili 0.3      05-14  TBili 0.7   /   AST 15   /   ALT 10   /   AlkP 120   /   Tptn 5.8   /   Alb 3.1    /   DBili --      05-13              Radiology:

## 2025-05-21 NOTE — PROGRESS NOTE ADULT - SUBJECTIVE AND OBJECTIVE BOX
GENERAL SURGERY PROGRESS NOTE    Patient: PATRICIA SPRAGUE , 74y ([birth year])Female   MRN: [id number]  Location: [place] [address] A  Visit: 05-13-25 Inpatient  Date: 05-21-25 @ 10:53    Hospital Day #: 9  Post-Op Day #: 7    Procedure/Dx/Injuries:  PERFORATED DUODENAL ULCER  S/P EX LAP AND ANTRECTOMY, ABD LEFT OPEN W/ ABTHERA  5/17 S/P 2ND LOOK LAP, BILROTH 2, PLACEMENT OF J     Events of past 24 hours:   5/21  DAY  - GI consult to exchange to GJ  - remove liz  -Tube study done, GI consuled to replace J    5/20  NIGHT   -PM rounds: SBP 120s, HR 60s, saturating 100% on T piece, NGT to LCWS, J tube clamped, abdomen, pt did not allow abdominal exam at this time, per nurse abdomen tender, BULMARO drain w/ yellow output   -D5 1/2 NS @ 50cc/hr for persistent hypoglycemia  -Kphos   -AM CXR  -IR consult in AM     DAY  - trickle feeds today with j tube 10-20  - keep NGT to suction  - lasix 20  - IV lock  - f/u 4pm labs  - per ID: d/c caspo- no yeast in cultures, narrow to  fluconazole 400x1, fluconazole 200 q24  - TF discontinued, tube feeds in NGT  - 1/2 amp  -possible IR consult fot PICC 5/21    PAST MEDICAL & SURGICAL HISTORY:  HTN (hypertension)  Diabetes mellitus  Obesity  Gastroesophageal reflux disease  Active asthma  Generalized OA  Afib  H/O hernia repair 2011 and revised in 2013  History of cholecystectomy  Status post debridement  H/O tracheostomy  S/P gastrostomy  S/P jejunostomy      Vitals:   T(F): 97.9 (05-21-25 @ 08:00), Max: 97.9 (05-21-25 @ 08:00)  HR: 88 (05-21-25 @ 10:00)  BP: 115/71 (05-21-25 @ 05:00)  RR: 49 (05-21-25 @ 10:00)  SpO2: 99% (05-21-25 @ 10:00)      Diet, NPO      Fluids: dextrose 5% + sodium chloride 0.45%.: Solution, 1000 milliLiter(s) infuse at 50 mL/Hr      I & O's:    05-20-25 @ 07:01  -  05-21-25 @ 07:00  --------------------------------------------------------  IN:    dextrose 5% + sodium chloride 0.45%: 550 mL    Glucerna: 120 mL    IV PiggyBack: 100 mL    Lactated Ringers: 150 mL  Total IN: 920 mL    OUT:    Bulb (mL): 465 mL    Nasogastric/Oral tube (mL): 750 mL    Ureteral Catheter (mL): 1765 mL  Total OUT: 2980 mL    Total NET: -2060 mL      PHYSICAL EXAM:  General: NAD, following commands  Cardiac: RRR  Respiratory: Normal respiratory effort on T-piece   Abdomen: Soft, non-distended, non-tender, left BULMARO with serobilious output. NGT in place to suction.   Skin: Warm/dry, normal color, no jaundice  Incision/wound: midline incision healing well, dressings in place, clean, dry and intact      MEDICATIONS  (STANDING):  acetaminophen   IVPB .. 1000 milliGRAM(s) IV Intermittent once  albuterol    90 MICROgram(s) HFA Inhaler 2 Puff(s) Inhalation every 6 hours  chlorhexidine 2% Cloths 1 Application(s) Topical <User Schedule>  dextrose 5% + sodium chloride 0.45%. 1000 milliLiter(s) (50 mL/Hr) IV Continuous <Continuous>  enoxaparin Injectable 40 milliGRAM(s) SubCutaneous every 24 hours  fluconAZOLE IVPB 200 milliGRAM(s) IV Intermittent every 24 hours  ipratropium 17 MICROgram(s) HFA Inhaler 1 Puff(s) Inhalation every 6 hours  meropenem  IVPB 1000 milliGRAM(s) IV Intermittent every 8 hours  nystatin Powder 1 Application(s) Topical two times a day  pantoprazole  Injectable 40 milliGRAM(s) IV Push every 12 hours    MEDICATIONS  (PRN):  HYDROmorphone  Injectable 0.25 milliGRAM(s) IV Push every 6 hours PRN Severe Pain (7 - 10)  sodium chloride 0.9% lock flush 10 milliLiter(s) IV Push every 1 hour PRN Pre/post blood products, medications, blood draw, and to maintain line patency      DVT PROPHYLAXIS: enoxaparin Injectable 40 milliGRAM(s) SubCutaneous every 24 hours    GI PROPHYLAXIS: pantoprazole  Injectable 40 milliGRAM(s) IV Push every 12 hours    ANTICOAGULATION:   ANTIBIOTICS:  fluconAZOLE IVPB 200 milliGRAM(s)  meropenem  IVPB 1000 milliGRAM(s)            LAB/STUDIES:  Labs:  CAPILLARY BLOOD GLUCOSE      POCT Blood Glucose.: 83 mg/dL (21 May 2025 05:35)  POCT Blood Glucose.: 86 mg/dL (21 May 2025 02:55)  POCT Blood Glucose.: 78 mg/dL (20 May 2025 23:53)  POCT Blood Glucose.: 87 mg/dL (20 May 2025 20:33)  POCT Blood Glucose.: 94 mg/dL (20 May 2025 20:13)  POCT Blood Glucose.: 65 mg/dL (20 May 2025 17:57)  POCT Blood Glucose.: 69 mg/dL (20 May 2025 12:31)                          7.5    9.03  )-----------( 200      ( 20 May 2025 20:30 )             24.3         05-20    146  |  111[H]  |  23[H]  ----------------------------<  123[H]  3.6   |  24  |  0.9      Calcium: 10.0 mg/dL (05-20-25 @ 20:30)      LFTs:     Blood Gas Arterial, Lactate: 0.7 mmol/L (05-19-25 @ 04:13)    ABG - ( 19 May 2025 04:13 )  pH: 7.39  /  pCO2: 38    /  pO2: 101   / HCO3: 23    / Base Excess: -1.8  /  SaO2: 98.0        ABG - ( 18 May 2025 04:12 )  pH: 7.42  /  pCO2: 33    /  pO2: 88    / HCO3: 21    / Base Excess: -2.6  /  SaO2: 98.1        ABG - ( 17 May 2025 08:44 )  pH: 7.44  /  pCO2: 31    /  pO2: 158   / HCO3: 21    / Base Excess: -2.6  /  SaO2: 99.1          Urinalysis Basic - ( 20 May 2025 20:30 )    Color: x / Appearance: x / SG: x / pH: x  Gluc: 123 mg/dL / Ketone: x  / Bili: x / Urobili: x   Blood: x / Protein: x / Nitrite: x   Leuk Esterase: x / RBC: x / WBC x   Sq Epi: x / Non Sq Epi: x / Bacteria: x          ASSESSMENT:  74y old Female with above PMHx, s/p exlap and second look laparotomy with billroth II and replacement of GJ    Plan:  - SICU management   - saturating well on T piece   - BULMARO drain with serobilious output  - pain control   - monitor output   - keep NGT to suction  - Tube study to evaluate J tube    SURGERY SPECTRA: [id number]

## 2025-05-21 NOTE — CONSULT NOTE ADULT - ASSESSMENT
81F PMHx HTN, a-fib (on xarelto), solitary kidney, DM, GERD, prior open cholecystectomy c/b suture granulomas s/p 2019 abdominal wall debridements by Dr. Banda, respiratory distress with prolonged ventilation during 11/2024 admission, s/p 11/19 tracheostomy and gastrostomy and feeding jejunostomy tube by Dr. Champion, ya catheter dependence who presented to the ED from NH on 5/13 with 4 days progressively worsening abdominal pain. Found to have perforated peptic ulcer. S/p EX LAP AND ANTRECTOMY, ABD LEFT OPEN W/ ABTHERA and then 5/17 S/P 2ND LOOK LAP, BILROTH 2, PLACEMENT OF J tube. GI consulted for replacement of J tube with GJ tube.     #J tube replacement   #inflammatory changes noted on CTAP 5/19 r/o bile leak   - KUB noted to have J tube coiled in stomach.   - CTAP on 5/19 showing  Inflammatory changes inferior to the left lobe of the liver are nonspecific and may reflect postoperative in etiology as well as inflammatory or infectious etiology. Biliary leak cannot be excluded either  LFTs WNL   no leukocytosis, Afebrile     Rec  Will plan for replacement of J tube with GJ tube tomorrow  NPO after midnight hold tube feeds  Please obtain preop labs (coags, CBC, CMP, mag) night before procedure   Can consider HIDA scan (biliary leak study) at this time but do not suspect    81F PMHx HTN, a-fib (on xarelto), solitary kidney, DM, GERD, prior open cholecystectomy c/b suture granulomas s/p 2019 abdominal wall debridements by Dr. Banda, respiratory distress with prolonged ventilation during 11/2024 admission, s/p 11/19 tracheostomy and gastrostomy and feeding jejunostomy tube by Dr. Champion, ya catheter dependence who presented to the ED from NH on 5/13 with 4 days progressively worsening abdominal pain. Found to have perforated peptic ulcer. S/p EX LAP AND ANTRECTOMY, ABD LEFT OPEN W/ ABTHERA and then 5/17 S/P 2ND LOOK LAP, BILROTH 2, PLACEMENT OF J tube. GI consulted for replacement of J tube with GJ tube.     #J tube replacement   #inflammatory changes noted on CTAP 5/19 r/o bile leak   - KUB noted to have J tube coiled in stomach.   - CTAP on 5/19 showing  Inflammatory changes inferior to the left lobe of the liver are nonspecific and may reflect postoperative in etiology as well as inflammatory or infectious etiology. Biliary leak cannot be excluded either. Can consider HIDA scan (biliary leak study)   - LFTs WNL   - no leukocytosis, Afebrile     Rec  Will plan for replacement of J tube with GJ tube tomorrow  NPO after midnight hold tube feeds  Please obtain preop labs (coags, CBC, CMP, mag) night before procedure   Can consider HIDA scan (biliary leak study)   Monitor drain output  81F PMHx HTN, a-fib (on xarelto), solitary kidney, DM, GERD, prior open cholecystectomy c/b suture granulomas s/p 2019 abdominal wall debridements by Dr. Banda, respiratory distress with prolonged ventilation during 11/2024 admission, s/p 11/19 tracheostomy and gastrostomy and feeding jejunostomy tube by Dr. Champion, ya catheter dependence who presented to the ED from NH on 5/13 with 4 days progressively worsening abdominal pain. Found to have perforated peptic ulcer. S/p EX LAP AND ANTRECTOMY, ABD LEFT OPEN W/ ABTHERA and then 5/17 S/P 2ND LOOK LAP, BILROTH 2, PLACEMENT OF J tube. GI consulted for replacement of J tube with GJ tube.     #J tube replacement   #inflammatory changes noted on CTAP 5/19 r/o bile leak   - KUB noted to have J tube coiled in stomach.   - CTAP on 5/19 showing  Inflammatory changes inferior to the left lobe of the liver are nonspecific and may reflect postoperative in etiology as well as inflammatory or infectious etiology. Biliary leak cannot be excluded either. Can consider HIDA scan (biliary leak study)   - LFTs WNL   - no leukocytosis, Afebrile     Rec  Will plan for replacement of J tube with GJ tube when further optimized with advanced GI  Please obtain preop labs (coags, CBC, CMP, mag) night before procedure   Can consider HIDA scan (biliary leak study)   Monitor drain output

## 2025-05-21 NOTE — PROGRESS NOTE ADULT - SUBJECTIVE AND OBJECTIVE BOX
PATRICIA SPRAGUE   [id number]   [birth year]    74yF  ============================================================   DATE OF INITIAL SICU/SDU CONSULT: 05-13-25    INDICATION FOR SICU CONSULT:  perforated duodenal ulcer      SICU COURSE EVENTS :  05-13 - admitted to SICU service  05-14 - s/p OR for exploratory laparotomy, started remdesivir for covid +, echo ordered  5/15 - Echo showing EF 30-35%, cardiology consulted. Pending RTOR. Remains on vasopressors, weaning.  5/16: S/p Exploratory laparotomy, bilroth 2, J tube placement. Episode of wheezing. Changed to meropenem per ID. AFIB slow ventricular response HR 40s-60s, remains on low dose pressors   5/17: Tolerating T piece, trickle feed started via J tube, s/p 1u PRBC to wean off levophed, remains on vasopressin, IVL  5/18: Midodrine 10mg q8 started, weaned off levophed. BULMARO drain bilious output > strict NPO, f/u fluid composition   5/19: CTAP - No evidence of drainable fluid collection.   5/20: Trickle feeds restarted but discontinued 2/2 high gastric residual, NGT kep to suction. Caspofungin discontinued, narrowed to diclucan. IR consult for PICC line.     24HOUR EVENTS  5/20  NIGHT   -PM rounds: SBP 120s, HR 60s, saturating 100% on T piece, NGT to LCWS, J tube clamped, abdomen, pt did not allow abdominal exam at this time, per nurse abdomen tender, BULMARO drain w/ yellow output   -D5 1/2 NS @ 50cc/hr for persistent hypoglycemia  -[facility]   -AM CXR  -IR consult in AM     DAY  - trickle feeds today with j tube 10-20  - keep NGT to suction  - lasix 20  - IV lock  - f/u 4pm labs  - per ID: d/c caspo- no yeast in cultures, narrow to  fluconazole 400x1, fluconazole 200 q24  - TF discontinued, tube feeds in NGT  - 1/2 amp  -possible IR consult fot PICC 5/21    [X] A ten-point review of systems was negative except as expressed in note.  [X] History was obtained from patient. If unable to participate in their care, history obtained from review of the chart and collateral sources of information.  ============================================================    PATRICIA SPRAGUE   [id number]   [birth year]    74yF  ============================================================   DATE OF INITIAL SICU/SDU CONSULT: 25    INDICATION FOR SICU CONSULT:  perforated duodenal ulcer      SICU COURSE EVENTS :   - admitted to SICU service   - s/p OR for exploratory laparotomy, started remdesivir for covid +, echo ordered  5/15 - Echo showing EF 30-35%, cardiology consulted. Pending RTOR. Remains on vasopressors, weaning.  : S/p Exploratory laparotomy, bilroth 2, J tube placement. Episode of wheezing. Changed to meropenem per ID. AFIB slow ventricular response HR 40s-60s, remains on low dose pressors   : Tolerating T piece, trickle feed started via J tube, s/p 1u PRBC to wean off levophed, remains on vasopressin, IVL  : Midodrine 10mg q8 started, weaned off levophed. BULMARO drain bilious output > strict NPO, f/u fluid composition   : CTAP - No evidence of drainable fluid collection.   : Trickle feeds restarted but discontinued 2/2 high gastric residual, NGT kep to suction. Caspofungin discontinued, narrowed to diclucan. IR consult for PICC line.     24HOUR EVENTS    NIGHT   -PM rounds: SBP 120s, HR 60s, saturating 100% on T piece, NGT to LCWS, J tube clamped, abdomen, pt did not allow abdominal exam at this time, per nurse abdomen tender, BULMARO drain w/ yellow output   -D5 1/2 NS @ 50cc/hr for persistent hypoglycemia  -Kphos   -AM CXR  -IR consult in AM     DAY  - trickle feeds today with j tube 10-20  - keep NGT to suction  - lasix 20  - IV lock  - f/u 4pm labs  - per ID: d/c caspo- no yeast in cultures, narrow to  fluconazole 400x1, fluconazole 200 q24  - TF discontinued, tube feeds in NGT  - 1/2 amp  -possible IR consult fot PICC     [X] A ten-point review of systems was negative except as expressed in note.  [X] History was obtained from patient. If unable to participate in their care, history obtained from review of the chart and collateral sources of information.  ============================================================   Daily     Daily Weight in k.8 (21 May 2025 06:00)    Diet, NPO (25 @ 23:37)      CURRENT MEDS:  Neurologic Medications  acetaminophen   IVPB .. 1000 milliGRAM(s) IV Intermittent once  HYDROmorphone  Injectable 0.25 milliGRAM(s) IV Push every 6 hours PRN Severe Pain (7 - 10)    Respiratory Medications  albuterol    90 MICROgram(s) HFA Inhaler 2 Puff(s) Inhalation every 6 hours  ipratropium 17 MICROgram(s) HFA Inhaler 1 Puff(s) Inhalation every 6 hours    Cardiovascular Medications    Gastrointestinal Medications  dextrose 5% + sodium chloride 0.45%. 1000 milliLiter(s) IV Continuous <Continuous>  pantoprazole  Injectable 40 milliGRAM(s) IV Push every 12 hours  sodium chloride 0.9% lock flush 10 milliLiter(s) IV Push every 1 hour PRN Pre/post blood products, medications, blood draw, and to maintain line patency    Genitourinary Medications    Hematologic/Oncologic Medications  enoxaparin Injectable 40 milliGRAM(s) SubCutaneous every 24 hours    Antimicrobial/Immunologic Medications  fluconAZOLE IVPB 200 milliGRAM(s) IV Intermittent every 24 hours  meropenem  IVPB 1000 milliGRAM(s) IV Intermittent every 8 hours    Endocrine/Metabolic Medications    Topical/Other Medications  chlorhexidine 0.12% Liquid 15 milliLiter(s) Oral Mucosa every 12 hours  chlorhexidine 2% Cloths 1 Application(s) Topical <User Schedule>  iohexol 300 mG (iodine)/mL Oral Solution 30 milliLiter(s) Oral once  nystatin Powder 1 Application(s) Topical two times a day      ICU Vital Signs Last 24 Hrs  T(C): 35.9 (20 May 2025 23:47), Max: 36.1 (20 May 2025 08:00)  T(F): 96.6 (20 May 2025 23:47), Max: 97 (20 May 2025 16:00)  HR: 77 (21 May 2025 05:00) (66 - 93)  BP: 115/71 (21 May 2025 05:00) (92/50 - 122/59)  BP(mean): 84 (21 May 2025 05:00) (64 - 84)  ABP: 133/58 (21 May 2025 05:00) (114/51 - 139/66)  ABP(mean): 82 (21 May 2025 05:00) (68 - 88)  RR: 25 (21 May 2025 05:00) (16 - 34)  SpO2: 97% (21 May 2025 05:00) (93% - 100%)    O2 Parameters below as of 21 May 2025 05:00  Patient On (Oxygen Delivery Method): T-piece  O2 Flow (L/min): 40          Adult Advanced Hemodynamics Last 24 Hrs  CVP(mm Hg): --  CVP(cm H2O): --  CO: --  CI: --  PA: --  PA(mean): --  PCWP: --  SVR: --  SVRI: --  PVR: --  PVRI: --          I&O's Summary    20 May 2025 07:  -  21 May 2025 07:00  --------------------------------------------------------  IN: 920 mL / OUT: 2980 mL / NET: -2060 mL      I&O's Detail    20 May 2025 07:01  -  21 May 2025 07:00  --------------------------------------------------------  IN:    dextrose 5% + sodium chloride 0.45%: 550 mL    Glucerna: 120 mL    IV PiggyBack: 100 mL    Lactated Ringers: 150 mL  Total IN: 920 mL    OUT:    Bulb (mL): 465 mL    Nasogastric/Oral tube (mL): 750 mL    Ureteral Catheter (mL): 1765 mL  Total OUT: 2980 mL    Total NET: - mL          PHYSICAL EXAM:    General/Neuro: GCS 11T  Lungs: Normal expansion/effort. On t-piece  Cardiovascular : S1, S2.    GI: Abdomen soft, Non-tender, Non-distended.  Midline dressing c/d/i. Left sided BULMARO with yellow output  Extremities: Extremities warm/ dry  :  Burger catheter in place.    LABS:  CAPILLARY BLOOD GLUCOSE      POCT Blood Glucose.: 83 mg/dL (21 May 2025 05:35)  POCT Blood Glucose.: 86 mg/dL (21 May 2025 02:55)  POCT Blood Glucose.: 78 mg/dL (20 May 2025 23:53)  POCT Blood Glucose.: 87 mg/dL (20 May 2025 20:33)  POCT Blood Glucose.: 94 mg/dL (20 May 2025 20:13)  POCT Blood Glucose.: 65 mg/dL (20 May 2025 17:57)  POCT Blood Glucose.: 69 mg/dL (20 May 2025 12:31)                          7.5    9.03  )-----------( 200      ( 20 May 2025 20:30 )             24.3       05-20    146  |  111[H]  |  23[H]  ----------------------------<  123[H]  3.6   |  24  |  0.9    Ca    10.0      20 May 2025 20:30  Phos  2.9     05-20  Mg     2.2     05-20          Urinalysis Basic - ( 20 May 2025 20:30 )    Color: x / Appearance: x / SG: x / pH: x  Gluc: 123 mg/dL / Ketone: x  / Bili: x / Urobili: x   Blood: x / Protein: x / Nitrite: x   Leuk Esterase: x / RBC: x / WBC x   Sq Epi: x / Non Sq Epi: x / Bacteria: x

## 2025-05-22 LAB
ALBUMIN SERPL ELPH-MCNC: 2.1 G/DL — LOW (ref 3.5–5.2)
ALP SERPL-CCNC: 91 U/L — SIGNIFICANT CHANGE UP (ref 30–115)
ALT FLD-CCNC: 9 U/L — SIGNIFICANT CHANGE UP (ref 0–41)
ANION GAP SERPL CALC-SCNC: 7 MMOL/L — SIGNIFICANT CHANGE UP (ref 7–14)
ANION GAP SERPL CALC-SCNC: 9 MMOL/L — SIGNIFICANT CHANGE UP (ref 7–14)
APTT BLD: 35 SEC — SIGNIFICANT CHANGE UP (ref 27–39.2)
AST SERPL-CCNC: 10 U/L — SIGNIFICANT CHANGE UP (ref 0–41)
BILIRUB DIRECT SERPL-MCNC: 0.2 MG/DL — SIGNIFICANT CHANGE UP (ref 0–0.3)
BILIRUB INDIRECT FLD-MCNC: 0.2 MG/DL — SIGNIFICANT CHANGE UP (ref 0.2–1.2)
BILIRUB SERPL-MCNC: 0.4 MG/DL — SIGNIFICANT CHANGE UP (ref 0.2–1.2)
BUN SERPL-MCNC: 14 MG/DL — SIGNIFICANT CHANGE UP (ref 10–20)
BUN SERPL-MCNC: 18 MG/DL — SIGNIFICANT CHANGE UP (ref 10–20)
CALCIUM SERPL-MCNC: 9.4 MG/DL — SIGNIFICANT CHANGE UP (ref 8.4–10.5)
CALCIUM SERPL-MCNC: 9.6 MG/DL — SIGNIFICANT CHANGE UP (ref 8.4–10.5)
CHLORIDE SERPL-SCNC: 111 MMOL/L — HIGH (ref 98–110)
CHLORIDE SERPL-SCNC: 114 MMOL/L — HIGH (ref 98–110)
CO2 SERPL-SCNC: 24 MMOL/L — SIGNIFICANT CHANGE UP (ref 17–32)
CO2 SERPL-SCNC: 24 MMOL/L — SIGNIFICANT CHANGE UP (ref 17–32)
CREAT SERPL-MCNC: 0.7 MG/DL — SIGNIFICANT CHANGE UP (ref 0.7–1.5)
CREAT SERPL-MCNC: 0.8 MG/DL — SIGNIFICANT CHANGE UP (ref 0.7–1.5)
EGFR: 78 ML/MIN/1.73M2 — SIGNIFICANT CHANGE UP
EGFR: 78 ML/MIN/1.73M2 — SIGNIFICANT CHANGE UP
EGFR: 92 ML/MIN/1.73M2 — SIGNIFICANT CHANGE UP
EGFR: 92 ML/MIN/1.73M2 — SIGNIFICANT CHANGE UP
GLUCOSE SERPL-MCNC: 87 MG/DL — SIGNIFICANT CHANGE UP (ref 70–99)
GLUCOSE SERPL-MCNC: 92 MG/DL — SIGNIFICANT CHANGE UP (ref 70–99)
HCT VFR BLD CALC: 27.9 % — LOW (ref 37–47)
HGB BLD-MCNC: 8.9 G/DL — LOW (ref 12–16)
INR BLD: 1.34 RATIO — HIGH (ref 0.65–1.3)
MAGNESIUM SERPL-MCNC: 1.8 MG/DL — SIGNIFICANT CHANGE UP (ref 1.8–2.4)
MAGNESIUM SERPL-MCNC: 1.9 MG/DL — SIGNIFICANT CHANGE UP (ref 1.8–2.4)
MCHC RBC-ENTMCNC: 27.9 PG — SIGNIFICANT CHANGE UP (ref 27–31)
MCHC RBC-ENTMCNC: 31.9 G/DL — LOW (ref 32–37)
MCV RBC AUTO: 87.5 FL — SIGNIFICANT CHANGE UP (ref 81–99)
NRBC BLD AUTO-RTO: 0 /100 WBCS — SIGNIFICANT CHANGE UP (ref 0–0)
PHOSPHATE SERPL-MCNC: 2.5 MG/DL — SIGNIFICANT CHANGE UP (ref 2.1–4.9)
PHOSPHATE SERPL-MCNC: 3.1 MG/DL — SIGNIFICANT CHANGE UP (ref 2.1–4.9)
PLATELET # BLD AUTO: 236 K/UL — SIGNIFICANT CHANGE UP (ref 130–400)
PMV BLD: 10.7 FL — HIGH (ref 7.4–10.4)
POTASSIUM SERPL-MCNC: 3.7 MMOL/L — SIGNIFICANT CHANGE UP (ref 3.5–5)
POTASSIUM SERPL-MCNC: 4.1 MMOL/L — SIGNIFICANT CHANGE UP (ref 3.5–5)
POTASSIUM SERPL-SCNC: 3.7 MMOL/L — SIGNIFICANT CHANGE UP (ref 3.5–5)
POTASSIUM SERPL-SCNC: 4.1 MMOL/L — SIGNIFICANT CHANGE UP (ref 3.5–5)
PROT SERPL-MCNC: 4.1 G/DL — LOW (ref 6–8)
PROTHROM AB SERPL-ACNC: 15.9 SEC — HIGH (ref 9.95–12.87)
RBC # BLD: 3.19 M/UL — LOW (ref 4.2–5.4)
RBC # FLD: 16.6 % — HIGH (ref 11.5–14.5)
SODIUM SERPL-SCNC: 144 MMOL/L — SIGNIFICANT CHANGE UP (ref 135–146)
SODIUM SERPL-SCNC: 145 MMOL/L — SIGNIFICANT CHANGE UP (ref 135–146)
WBC # BLD: 10.2 K/UL — SIGNIFICANT CHANGE UP (ref 4.8–10.8)
WBC # FLD AUTO: 10.2 K/UL — SIGNIFICANT CHANGE UP (ref 4.8–10.8)

## 2025-05-22 PROCEDURE — 99291 CRITICAL CARE FIRST HOUR: CPT | Mod: 24,25

## 2025-05-22 RX ORDER — MAGNESIUM SULFATE 500 MG/ML
2 SYRINGE (ML) INJECTION ONCE
Refills: 0 | Status: COMPLETED | OUTPATIENT
Start: 2025-05-22 | End: 2025-05-22

## 2025-05-22 RX ADMIN — Medication 100 MILLIGRAM(S): at 23:04

## 2025-05-22 RX ADMIN — Medication 25 GRAM(S): at 01:45

## 2025-05-22 RX ADMIN — Medication 2 PUFF(S): at 14:14

## 2025-05-22 RX ADMIN — Medication 1 PUFF(S): at 07:45

## 2025-05-22 RX ADMIN — NYSTATIN 1 APPLICATION(S): 100000 CREAM TOPICAL at 06:07

## 2025-05-22 RX ADMIN — Medication 1 PUFF(S): at 02:55

## 2025-05-22 RX ADMIN — Medication 2 PUFF(S): at 02:55

## 2025-05-22 RX ADMIN — MEROPENEM 100 MILLIGRAM(S): 1 INJECTION INTRAVENOUS at 21:54

## 2025-05-22 RX ADMIN — ENOXAPARIN SODIUM 40 MILLIGRAM(S): 100 INJECTION SUBCUTANEOUS at 11:00

## 2025-05-22 RX ADMIN — Medication 1 APPLICATION(S): at 06:07

## 2025-05-22 RX ADMIN — Medication 0.25 MILLIGRAM(S): at 11:15

## 2025-05-22 RX ADMIN — Medication 0.25 MILLIGRAM(S): at 18:23

## 2025-05-22 RX ADMIN — Medication 40 MILLIGRAM(S): at 06:07

## 2025-05-22 RX ADMIN — Medication 0.25 MILLIGRAM(S): at 11:00

## 2025-05-22 RX ADMIN — Medication 40 MILLIGRAM(S): at 17:20

## 2025-05-22 RX ADMIN — MEROPENEM 100 MILLIGRAM(S): 1 INJECTION INTRAVENOUS at 06:00

## 2025-05-22 RX ADMIN — Medication 2 PUFF(S): at 07:47

## 2025-05-22 RX ADMIN — NYSTATIN 1 APPLICATION(S): 100000 CREAM TOPICAL at 17:20

## 2025-05-22 RX ADMIN — MEROPENEM 100 MILLIGRAM(S): 1 INJECTION INTRAVENOUS at 13:46

## 2025-05-22 RX ADMIN — Medication 1 PUFF(S): at 14:14

## 2025-05-22 RX ADMIN — Medication 0.25 MILLIGRAM(S): at 18:08

## 2025-05-22 NOTE — PROGRESS NOTE ADULT - ASSESSMENT
81F PMHx HTN, a-fib (on xarelto), solitary kidney, DM, GERD, prior open cholecystectomy c/b suture granulomas s/p 2019 abdominal wall debridements by Dr. Banda, respiratory distress with prolonged ventilation during 11/2024 admission, s/p 11/19 tracheostomy and gastrostomy and feeding jejunostomy tube by Dr. Champion, ya catheter dependence who presented to the ED from NH on 5/13 with 4 days progressively worsening abdominal pain. Found to have perforated peptic ulcer. S/p EX LAP AND ANTRECTOMY, ABD LEFT OPEN W/ ABTHERA and then 5/17 S/P 2ND LOOK LAP, BILROTH 2, PLACEMENT OF J tube. GI consulted for replacement of J tube with GJ tube.     #J tube replacement   #inflammatory changes noted on CTAP 5/19 r/o bile leak vs anastomotic leak  - KUB noted to have J tube coiled in stomach.   - CTAP on 5/19 showing  Inflammatory changes inferior to the left lobe of the liver are nonspecific and may reflect postoperative in etiology as well as inflammatory or infectious etiology. Biliary leak cannot be excluded either. Can consider HIDA scan (biliary leak study)   - LFTs WNL   - no leukocytosis, Afebrile   - HP drain in place, bilious output    Rec  Will tentatively plan for replacement of J tube with GJ tube tomorrow  NPO after midnight hold tube feeds  Can consider HIDA scan (biliary leak study)   Monitor drain output

## 2025-05-22 NOTE — PROGRESS NOTE ADULT - ATTENDING COMMENTS
Patient seen and examined on service. Labs and pertinent imaging were reviewed by me. Chart notes from medicine and additional disciplines were reviewed by me. Agree with above and modified where needed.

## 2025-05-22 NOTE — PROGRESS NOTE ADULT - ASSESSMENT
74F found to have perforated peptic ulcer now s/p ex lap, distal antrectomy, abthera placement requiring vasopressors. RTOR 5/16 s/p second look laparotomy, Billroth II reconstruction, gastrojejunostomy tube replaced with a jejunal feeding tube, abdomen closed at the end of the case.     NEUROLOGICAL:  #Acute pain   - IV APAP while NPO   - Dilaudid 0.25 PRN    RESPIRATORY:   #Respiratory failure, s/p tracheostomy (11/24)    - now with 8 portex cuffed trach (switched in OR on 5/14)    - toelrating T- piece x 24 hrs (5/17)    - continue duonebs   #Decreased small bibasilar opacities/atelectasis, right greater than left on CT  - 5/16: s/p deep suctioning by anesthesia w/ copious secretions   #h/o asthma  - atrovent and albuterol   #Increasd secretions  - 3% inhaled NS  #Activity   - increase as tolerated     CARDS:   #Nonsustained Vtach - self resolved   - cards consult: management of septic shock. Start short acting AC for afib. Start beta blockers once off pressors   #hypotension 2/2 sepsis   - off pressors 5/19  - MAP >65  - Holding midodrine 10 mg Q8h while NPO   #hx afib  - transiently was in AFIB slow ventricular response (HR 40s-60s), remains on low dose pressors   - holding home Xarelto 15 QD  #hx HTN  - holding home losartan 100QD  #elevated troponin  - 143 > 84   - no longer trending  #TTE 11/24: EF 71%. Mild-mod AS. Trivial pericardial effusion.   - Echo 5/15: EF 30-35%, multiple left ventricular motion wall abnormalities. Possible Takotsubo vs ischemic. Global left ventricular systolic function.     GASTROINTESTINAL/NUTRITION:   #perforated prepyloric gastric ulcer s/p exploratory laparotomy antrectomy and D1 stapled off with temporary abdominal closure and abthera in place  - 5/16: RTOR s/p bilroth 2, J tube placement, RLQ maliha drain by anastomosis extending to duodenal stump  - 5/18: BULMARO drain now w/ bilious output > pt made strict NPO, BULMARO drain bilirubin 9.7, LFTs WNL   #Diet, NPO strict    - Trickle feeds in NGT, made NPO    - J tube study performed 5/21 --> GI consulted for exchange of J for GJ    - aspiration precautions, HOB 30  #GI Prophylaxis    - protonix 40mg bid IV  #Bowel regimen    -holding    -last bowel movement 5/17    /RENAL:   #urine output in critically ill    -chronic indwelling ya > new ya exchanged 5/17  #Maintain euvolemia    -D51/2 NS @ 50cc/hr while NPO    Labs:          BUN/Cr- 20/0.8  -->,  18/0.8  -->          [05-21 @ 23:28]Na  145 // K  4.1 // Mg  1.8 // Phos  3.1    #metabolic acidosis, resolved  - S/p Lasix 20mg x1 5/21  #NOLA (baseline creatine 0.6), improving   - nephrology consulted > no RRT at this time  #Hx of solitary kidney  #Hypokalemia, hypophosphatemia - repleted       HEME/ONC:   #DVT prophylaxis    -LVX    -SCDs  #anemia  - 1pRBC 5/17 to help wean off vasopressors    Labs: Hb/Hct:  7.5/24.3  -->,  8.5/26.3  -->                      Plts:  200  -->,  245  -->                 PTT/INR:  35.0/1.34  --->       ID:  WBC- 10.58  --->>,  9.03  --->>,  9.76  --->>  Temp trend- 24hrs T(F): 96 (05-21 @ 20:00), Max: 97.9 (05-21 @ 08:00)  #perforated duodenal ulcer  - zosyn (renal dosing) (5/14 - 5/16)  - Meropenem (5/16 - )  - caspofungin per primary team (5/14 - 5/20)  - Fluconazole 200mg q24  - Nystatin powder for groin rash (5/15 - )  #COVID positive on admission    -  completed remdesivir 2 day course  #Cultures    - 5/13: blood culture: NGTD    - 5/13: blood culture: NGTD    - 5/13: urine culture: E Coli  - ID following    ENDOCRINE:  #glycemic monitoring    -D51/2 NS for persistent hypoglycemia     -FSG q6 while NPO    -Glucose goal 140-180. If above 180, will start corrective insulin sliding scale  #hypercalcemia  -  home cinacalcet 30mg bid, must be crushed well to not clog NGT - holding while NPO     MSK:  #Activity - increase as tolerated   #hx osteoporosis  - holding home Alendronate 70mg weekly on Thursdays while NPO    SKIN:  #DTI screening negative 5/21    - will continue to monitor daily skin changes      PALLIATIVE:  Currently full code, palliative consult       LINES/DRAINS:  PIV, Ya (replaced 5/17), J tube (5/16), 8 cuffed portex trach, R radial arterial line (5/14- 5/21), LIJ CVC (5/14- 5/20)  ADVANCED DIRECTIVES:  Full Code    HCP/Emergency Contact- daughter Cecilia [phone]  INDICATION FOR SICU/SDU:  perforated peptic ulcer; mechanical ventilation/vasopressors     DISPO:  SICU. Case to be discussed with attending Dr. Pedersen   74F found to have perforated peptic ulcer now s/p ex lap, distal antrectomy, abthera placement requiring vasopressors. RTOR 5/16 s/p second look laparotomy, Billroth II reconstruction, gastrojejunostomy tube replaced with a jejunal feeding tube, abdomen closed at the end of the case.     NEUROLOGICAL:  #Acute pain  - IV APAP while NPO   - Dilaudid 0.25 PRN    RESPIRATORY:   #Respiratory failure, s/p tracheostomy (11/24)    - now with 8 portex cuffed trach (switched in OR on 5/14)    - tolerating T- piece x 24 hrs (5/17)    - continue duonebs   #Decreased small bibasilar opacities/atelectasis, right greater than left on CT  - 5/16: s/p deep suctioning by anesthesia w/ copious secretions   #h/o asthma  - atrovent and albuterol   #Increased secretions  - 3% inhaled NS  #Activity   - increase as tolerated     CARDS:   #Nonsustained Vtach - self resolved   - cards consult: management of septic shock. Start short acting AC for afib. Start beta blockers once off pressors   #hypotension 2/2 sepsis   - off vasopressors since 5/19  - MAP >65  - Holding midodrine 10 mg Q8h while NPO   #hx afib  - transiently was in AFIB slow ventricular response (HR 40s-60s), remains on low dose pressors   - holding home Xarelto 15 QD  #hx HTN  - holding home losartan 100QD  #elevated troponin  - 143 > 84, no longer trending  #TTE 11/24: EF 71%. Mild-mod AS. Trivial pericardial effusion.   - Echo 5/15: EF 30-35%, multiple left ventricular motion wall abnormalities. Possible Takotsubo vs ischemic. Global left ventricular systolic function.     GASTROINTESTINAL/NUTRITION:   #perforated prepyloric gastric ulcer s/p exploratory laparotomy antrectomy and D1 stapled off with temporary abdominal closure and abthera in place  - 5/16: RTOR s/p bilroth 2, J tube placement, RLQ maliha drain by anastomosis extending to duodenal stump  - 5/18: BULMARO drain now w/ bilious output > pt made strict NPO, BULMARO drain bilirubin 9.7, LFTs WNL   #Diet, NPO strict    - J tube study performed 5/21 --> GI consulted for exchange of J for GJ    - aspiration precautions, HOB 30  #GI Prophylaxis    - protonix 40mg bid IV  #Bowel regimen    -holding    -last bowel movement 5/17    /RENAL:   #urine output in critically ill    -chronic indwelling ya > new ya exchanged 5/17  #Maintain euvolemia    -D51/2 NS @ 50cc/hr while NPO    Labs:          BUN/Cr- 20/0.8  -->,  18/0.8  -->          [05-21 @ 23:28]Na  145 // K  4.1 // Mg  1.8 // Phos  3.1  #Hypomagnesemia    - 2g magnesium sulfate replenished    #metabolic acidosis, resolved  - S/p Lasix 20mg x1 5/21  #NOLA (baseline creatine 0.6), improving   - nephrology consulted > no RRT at this time  #Hx of solitary kidney    HEME/ONC:   #DVT prophylaxis    -Lovenox    -SCDs  #anemia  - 1pRBC 5/17 to help wean off vasopressors    Labs: Hb/Hct:  7.5/24.3  -->,  8.5/26.3  -->                      Plts:  200  -->,  245  -->                 PTT/INR:  35.0/1.34  --->       ID:  WBC- 10.58  --->>,  9.03  --->>,  9.76  --->>  Temp trend- 24hrs T(F): 96 (05-21 @ 20:00), Max: 97.9 (05-21 @ 08:00)  #perforated duodenal ulcer  - Meropenem (5/16 - )  - Fluconazole 200mg q24  - Nystatin powder for groin rash (5/15 - )  Completed antibiotics:  - zosyn (renal dosing) (5/14 - 5/16)  - caspofungin per primary team (5/14 - 5/20)  #COVID positive on admission    -  completed remdesivir 2 day course  #Cultures    - 5/13: blood culture: NGTD    - 5/13: blood culture: NGTD    - 5/13: urine culture: E Coli  - ID following    ENDOCRINE:  #glycemic monitoring    -D51/2 NS for persistent hypoglycemia     -FSG q6 while NPO    -Glucose goal 140-180. If above 180, will start corrective insulin sliding scale  #hypercalcemia  -  home cinacalcet 30mg bid, must be crushed well to not clog NGT - holding while NPO     MSK:  #Activity - increase as tolerated   #hx osteoporosis  - holding home Alendronate 70mg weekly on Thursdays while NPO    SKIN:  #DTI screening negative 5/21    - will continue to monitor daily skin changes      PALLIATIVE:  Currently full code, palliative consult       LINES/DRAINS:  PIV, Ya (replaced 5/17), J tube (5/16), 8 cuffed portex trach, R radial arterial line (5/14- 5/21), LIJ CVC (5/14- 5/20)  ADVANCED DIRECTIVES:  Full Code    HCP/Emergency Contact- daughter Cecilia [phone]  INDICATION FOR SICU/SDU:  perforated peptic ulcer; mechanical ventilation/vasopressors     DISPO:  SICU. Case to be discussed with attending Dr. Pedersen   74F found to have perforated peptic ulcer now s/p ex lap, distal antrectomy, abthera placement requiring vasopressors. RTOR 5/16 s/p second look laparotomy, Billroth II reconstruction, gastrojejunostomy tube replaced with a jejunal feeding tube, abdomen closed at the end of the case.     NEUROLOGICAL:  #Acute pain  - IV APAP while NPO   - Dilaudid 0.25 PRN    RESPIRATORY:   #Respiratory failure, s/p tracheostomy (11/24)    - now with 8 portex cuffed trach (switched in OR on 5/14)    - tolerating T- piece x 24 hrs (5/17)    - continue duonebs   #Decreased small bibasilar opacities/atelectasis, right greater than left on CT  - 5/16: s/p deep suctioning by anesthesia w/ copious secretions   #h/o asthma  - atrovent and albuterol   #Increased secretions  - 3% inhaled NS  #Activity   - increase as tolerated     CARDS:   #Nonsustained Vtach - self resolved   - cards consult: management of septic shock. Start short acting AC for afib. Start beta blockers once off pressors   #hypotension 2/2 sepsis   - off vasopressors since 5/19  - MAP >65  - Holding midodrine 10 mg Q8h while NPO   #hx afib  - transiently was in AFIB slow ventricular response (HR 40s-60s), remains on low dose pressors   - holding home Xarelto 15 QD  #hx HTN  - holding home losartan 100QD  #elevated troponin  - 143 > 84, no longer trending  #TTE 11/24: EF 71%. Mild-mod AS. Trivial pericardial effusion.   - Echo 5/15: EF 30-35%, multiple left ventricular motion wall abnormalities. Possible Takotsubo vs ischemic. Global left ventricular systolic function.     GASTROINTESTINAL/NUTRITION:   #perforated prepyloric gastric ulcer s/p exploratory laparotomy antrectomy and D1 stapled off with temporary abdominal closure and abthera in place  - 5/16: RTOR s/p bilroth 2, J tube placement, RLQ maliha drain by anastomosis extending to duodenal stump  - 5/18: BULMARO drain now w/ bilious output > pt made strict NPO, BULMARO drain bilirubin 9.7, LFTs WNL   #Diet, NPO strict   - J tube study performed 5/21 --> GI consulted for exchange of J for GJ, PLAN FOR 5/22   - aspiration precautions, HOB 30  #GI Prophylaxis    - protonix 40mg bid IV  #Bowel regimen    -holding    -last bowel movement 5/17    /RENAL:   #urine output in critically ill    -chronic indwelling ya > new ya exchanged 5/17  #Maintain euvolemia    -D51/2 NS @ 50cc/hr while NPO    Labs:          BUN/Cr- 20/0.8  -->,  18/0.8  -->          [05-21 @ 23:28]Na  145 // K  4.1 // Mg  1.8 // Phos  3.1  #Hypomagnesemia    - 2g magnesium sulfate replenished    #metabolic acidosis, resolved  - S/p Lasix 20mg x1 5/21  #NOLA (baseline creatine 0.6), improving   - nephrology consulted > no RRT at this time  #Hx of solitary kidney    HEME/ONC:   #DVT prophylaxis    -Lovenox    -SCDs  #anemia  - 1pRBC 5/17 to help wean off vasopressors    Labs: Hb/Hct:  7.5/24.3  -->,  8.5/26.3  -->                      Plts:  200  -->,  245  -->                 PTT/INR:  35.0/1.34  --->       ID:  WBC- 10.58  --->>,  9.03  --->>,  9.76  --->>  Temp trend- 24hrs T(F): 96 (05-21 @ 20:00), Max: 97.9 (05-21 @ 08:00)  #perforated duodenal ulcer  - Meropenem (5/16 - )  - Fluconazole 200mg q24  - Nystatin powder for groin rash (5/15 - )  Completed antibiotics:  - zosyn (renal dosing) (5/14 - 5/16)  - caspofungin per primary team (5/14 - 5/20)  #COVID positive on admission    -  completed remdesivir 2 day course  #Cultures    - 5/13: blood culture: NGTD    - 5/13: blood culture: NGTD    - 5/13: urine culture: E Coli  - ID following    ENDOCRINE:  #glycemic monitoring    -D51/2 NS for persistent hypoglycemia     -FSG q6 while NPO    -Glucose goal 140-180. If above 180, will start corrective insulin sliding scale  #hypercalcemia  -  home cinacalcet 30mg bid, must be crushed well to not clog NGT - holding while NPO     MSK:  #Activity - increase as tolerated   #hx osteoporosis  - holding home Alendronate 70mg weekly on Thursdays while NPO    SKIN:  #DTI screening negative 5/21    - will continue to monitor daily skin changes      PALLIATIVE:  Currently full code, palliative consult       LINES/DRAINS:  PIV, Ya (replaced 5/17), J tube (5/16), 8 cuffed portex trach,     Discontinued lines: R radial arterial line (5/14- 5/22), LIJ CVC (5/14- 5/20)  ADVANCED DIRECTIVES:  Full Code    HCP/Emergency Contact- daughter Cecilia [phone]  INDICATION FOR SICU/SDU:  perforated peptic ulcer; mechanical ventilation/vasopressors     DISPO:  SICU. Case discussed with attending Dr. Pedersen

## 2025-05-22 NOTE — PROGRESS NOTE ADULT - SUBJECTIVE AND OBJECTIVE BOX
GENERAL SURGERY PROGRESS NOTE    Patient: PATRICIA SPRAGUE , 74y ([birth year])Female   MRN: [id number]  Location: [place] [address] A  Visit: 05-13-25 Inpatient  Date: 05-22-25 @ 09:49    Hospital Day #: 10  Post-Op Day #: 8    Procedure/Dx/Injuries: Duodenal ulcer, S/P EX LAP AND ANTRECTOMY, ABD LEFT OPEN W/ ABTHERA  5/17 S/P 2ND LOOK LAP, BILROTH 2, PLACEMENT OF J    Events of past 24 hours: Off pressors, GI for possible switching J to GJ, npo. Possible DG to SDU- needs covid precautions until 5/23.    PAST MEDICAL & SURGICAL HISTORY:  HTN (hypertension)  Diabetes mellitus  Obesity  Gastroesophageal reflux disease  Active asthma  Generalized OA  Afib  H/O hernia repair  2011 and revised in 2013  History of cholecystectomy  Status post debridement  H/O tracheostomy  S/P gastrostom  S/P jejunostomy        Vitals:   T(F): 96.8 (05-22-25 @ 08:00), Max: 96.8 (05-22-25 @ 08:00)  HR: 80 (05-22-25 @ 09:00)  BP: --  RR: 19 (05-22-25 @ 09:00)  SpO2: 100% (05-22-25 @ 09:00)  Mode: standby    Diet, NPO      Fluids:     I & O's:    05-21-25 @ 07:01  -  05-22-25 @ 07:00  --------------------------------------------------------  IN:    dextrose 5% + sodium chloride 0.45%: 1200 mL    IV PiggyBack: 200 mL    IV PiggyBack: 500 mL    IV PiggyBack: 50 mL    IV PiggyBack: 100 mL  Total IN: 2050 mL    OUT:    Bulb (mL): 690 mL    Nasogastric/Oral tube (mL): 0 mL    Ureteral Catheter (mL): 2250 mL  Total OUT: 2940 mL    Total NET: -890 mL    PHYSICAL EXAM:      MEDICATIONS  (STANDING):  acetaminophen   IVPB .. 1000 milliGRAM(s) IV Intermittent once  albuterol    90 MICROgram(s) HFA Inhaler 2 Puff(s) Inhalation every 6 hours  chlorhexidine 2% Cloths 1 Application(s) Topical <User Schedule>  dextrose 5% + sodium chloride 0.45%. 1000 milliLiter(s) (50 mL/Hr) IV Continuous <Continuous>  enoxaparin Injectable 40 milliGRAM(s) SubCutaneous every 24 hours  fluconAZOLE IVPB 200 milliGRAM(s) IV Intermittent every 24 hours  ipratropium 17 MICROgram(s) HFA Inhaler 1 Puff(s) Inhalation every 6 hours  meropenem  IVPB 1000 milliGRAM(s) IV Intermittent every 8 hours  nystatin Powder 1 Application(s) Topical two times a day  pantoprazole  Injectable 40 milliGRAM(s) IV Push every 12 hours  sodium chloride 3%  Inhalation 4 milliLiter(s) Inhalation every 12 hours    MEDICATIONS  (PRN):  HYDROmorphone  Injectable 0.25 milliGRAM(s) IV Push every 6 hours PRN Severe Pain (7 - 10)  sodium chloride 0.9% lock flush 10 milliLiter(s) IV Push every 1 hour PRN Pre/post blood products, medications, blood draw, and to maintain line patency      DVT PROPHYLAXIS: enoxaparin Injectable 40 milliGRAM(s) SubCutaneous every 24 hours    GI PROPHYLAXIS: pantoprazole  Injectable 40 milliGRAM(s) IV Push every 12 hours    ANTICOAGULATION:   ANTIBIOTICS:  fluconAZOLE IVPB 200 milliGRAM(s)  meropenem  IVPB 1000 milliGRAM(s)            LAB/STUDIES:  Labs:  CAPILLARY BLOOD GLUCOSE      POCT Blood Glucose.: 94 mg/dL (22 May 2025 06:06)  POCT Blood Glucose.: 81 mg/dL (21 May 2025 23:07)  POCT Blood Glucose.: 92 mg/dL (21 May 2025 17:39)  POCT Blood Glucose.: 85 mg/dL (21 May 2025 11:55)                          8.5    9.76  )-----------( 245      ( 21 May 2025 23:28 )             26.3       Auto Immature Granulocyte %: 1.0 % (05-21-25 @ 23:28)    05-21    145  |  114[H]  |  18  ----------------------------<  87  4.1   |  24  |  0.8      Calcium: 9.6 mg/dL (05-21-25 @ 23:28)      LFTs:             4.1  | 0.4  | 10       ------------------[91      ( 21 May 2025 23:28 )  2.1  | 0.2  | 9             ABG - ( 19 May 2025 04:13 )  pH: 7.39  /  pCO2: 38    /  pO2: 101   / HCO3: 23    / Base Excess: -1.8  /  SaO2: 98.0      ABG - ( 18 May 2025 04:12 )  pH: 7.42  /  pCO2: 33    /  pO2: 88    / HCO3: 21    / Base Excess: -2.6  /  SaO2: 98.1      ABG - ( 17 May 2025 08:44 )  pH: 7.44  /  pCO2: 31    /  pO2: 158   / HCO3: 21    / Base Excess: -2.6  /  SaO2: 99.1      Coags:     15.90  ----< 1.34    ( 21 May 2025 23:28 )     35.0      Urinalysis Basic - ( 21 May 2025 23:28 )    Color: x / Appearance: x / SG: x / pH: x  Gluc: 87 mg/dL / Ketone: x  / Bili: x / Urobili: x   Blood: x / Protein: x / Nitrite: x   Leuk Esterase: x / RBC: x / WBC x   Sq Epi: x / Non Sq Epi: x / Bacteria: x    IMAGING:  n/a    ACCESS/ DEVICES:  [x ] Peripheral IV

## 2025-05-22 NOTE — PROGRESS NOTE ADULT - SUBJECTIVE AND OBJECTIVE BOX
Gastroenterology progress note:     Patient is a 74y old  Female who presents with a chief complaint of Septic (16 May 2025 12:06)       Admitted on: 05-13-25    We are following the patient for:   GJ exchange    Interval History:    No acute events overnight.     PAST MEDICAL & SURGICAL HISTORY:  HTN (hypertension)      Diabetes mellitus      Obesity      Gastroesophageal reflux disease      Active asthma      Generalized OA      Afib      H/O hernia repair  2011 and revised in 2013      History of cholecystectomy      Status post debridement      H/O tracheostomy      S/P gastrostomy      S/P jejunostomy          MEDICATIONS  (STANDING):  acetaminophen   IVPB .. 1000 milliGRAM(s) IV Intermittent once  albuterol    90 MICROgram(s) HFA Inhaler 2 Puff(s) Inhalation every 6 hours  chlorhexidine 2% Cloths 1 Application(s) Topical <User Schedule>  dextrose 5% + sodium chloride 0.45%. 1000 milliLiter(s) (50 mL/Hr) IV Continuous <Continuous>  enoxaparin Injectable 40 milliGRAM(s) SubCutaneous every 24 hours  fluconAZOLE IVPB 200 milliGRAM(s) IV Intermittent every 24 hours  ipratropium 17 MICROgram(s) HFA Inhaler 1 Puff(s) Inhalation every 6 hours  meropenem  IVPB 1000 milliGRAM(s) IV Intermittent every 8 hours  nystatin Powder 1 Application(s) Topical two times a day  pantoprazole  Injectable 40 milliGRAM(s) IV Push every 12 hours  sodium chloride 3%  Inhalation 4 milliLiter(s) Inhalation every 12 hours    MEDICATIONS  (PRN):  HYDROmorphone  Injectable 0.25 milliGRAM(s) IV Push every 6 hours PRN Severe Pain (7 - 10)  sodium chloride 0.9% lock flush 10 milliLiter(s) IV Push every 1 hour PRN Pre/post blood products, medications, blood draw, and to maintain line patency      Allergies  No Known Allergies      Review of Systems:   Cardiovascular:  No Chest Pain, No Palpitations  Respiratory:  No Cough, No Dyspnea  Gastrointestinal:  As described in HPI  Skin:  No Skin Lesions, No Jaundice  Neuro:  No Syncope, No Dizziness    Physical Examination:  T(C): 36.1 (05-22-25 @ 16:00), Max: 36.1 (05-22-25 @ 16:00)  HR: 91 (05-22-25 @ 16:00) (68 - 96)  BP: 119/56 (05-22-25 @ 16:00) (112/56 - 128/66)  RR: 16 (05-22-25 @ 16:00) (4 - 23)  SpO2: 95% (05-22-25 @ 16:00) (95% - 100%)  Weight (kg): 99.1 (05-22-25 @ 06:00)    05-21-25 @ 07:01  -  05-22-25 @ 07:00  --------------------------------------------------------  IN: 2050 mL / OUT: 2940 mL / NET: -890 mL    05-22-25 @ 07:01  -  05-22-25 @ 17:21  --------------------------------------------------------  IN: 500 mL / OUT: 570 mL / NET: -70 mL        GENERAL: AAOx3, no acute distress.  HEAD:  Atraumatic, Normocephalic  EYES: conjunctiva and sclera clear  NECK: Supple, no JVD or thyromegaly  CHEST/LUNG: Clear to auscultation bilaterally; No wheeze, rhonchi, or rales  HEART: Regular rate and rhythm; normal S1, S2, No murmurs.  ABDOMEN: Soft, nontender, nondistended; Bowel sounds present  NEUROLOGY: No asterixis or tremor.   SKIN: Intact, no jaundice     Data:                        8.5    9.76  )-----------( 245      ( 21 May 2025 23:28 )             26.3     Hgb trend:  8.5  05-21-25 @ 23:28  7.5  05-20-25 @ 20:30  7.9  05-20-25 @ 06:50  7.1  05-19-25 @ 21:40        05-21    145  |  114[H]  |  18  ----------------------------<  87  4.1   |  24  |  0.8    Ca    9.6      21 May 2025 23:28  Phos  3.1     05-21  Mg     1.8     05-21    TPro  4.1[L]  /  Alb  2.1[L]  /  TBili  0.4  /  DBili  0.2  /  AST  10  /  ALT  9   /  AlkPhos  91  05-21    Liver panel trend:  TBili 0.4   /   AST 10   /   ALT 9   /   AlkP 91   /   Tptn 4.1   /   Alb 2.1    /   DBili 0.2      05-21  TBili 0.4   /   AST 11   /   ALT 9   /   AlkP 82   /   Tptn 4.2   /   Alb 2.4    /   DBili 0.2      05-18  TBili 0.3   /   AST 11   /   ALT 9   /   AlkP 75   /   Tptn 4.0   /   Alb 2.3    /   DBili 0.2      05-16  TBili 0.5   /   AST 20   /   ALT 16   /   AlkP 100   /   Tptn 4.9   /   Alb 2.7    /   DBili 0.2      05-14  TBili 0.5   /   AST 35   /   ALT 18   /   AlkP 114   /   Tptn 4.4   /   Alb 2.4    /   DBili 0.3      05-14  TBili 0.7   /   AST 15   /   ALT 10   /   AlkP 120   /   Tptn 5.8   /   Alb 3.1    /   DBili --      05-13      PT/INR - ( 21 May 2025 23:28 )   PT: 15.90 sec;   INR: 1.34 ratio         PTT - ( 21 May 2025 23:28 )  PTT:35.0 sec       Radiology:

## 2025-05-22 NOTE — PROGRESS NOTE ADULT - SUBJECTIVE AND OBJECTIVE BOX
PATRICIA SPRAGUE   [id number]   [birth year]    74yF  ============================================================   DATE OF INITIAL SICU/SDU CONSULT: 05-13-25    INDICATION FOR SICU CONSULT:  perforated duodenal ulcer      SICU COURSE EVENTS :  05-13 - admitted to SICU service  05-14 - s/p OR for exploratory laparotomy, started remdesivir for covid +, echo ordered  5/15 - Echo showing EF 30-35%, cardiology consulted. Pending RTOR. Remains on vasopressors, weaning.  5/16: S/p Exploratory laparotomy, bilroth 2, J tube placement. Episode of wheezing. Changed to meropenem per ID. AFIB slow ventricular response HR 40s-60s, remains on low dose pressors   5/17: Tolerating T piece, trickle feed started via J tube, s/p 1u PRBC to wean off levophed, remains on vasopressin, IVL  5/18: Midodrine 10mg q8 started, weaned off levophed. BULMARO drain bilious output > strict NPO, f/u fluid composition   5/19: CTAP - No evidence of drainable fluid collection.   5/20: Trickle feeds restarted but discontinued 2/2 high gastric residual, NGT kep to suction. Caspofungin discontinued, narrowed to diclucan. IR consult for PICC line.   5/21: GI consulted for GJ replacement    24HOUR EVENTS  5/21  NIGHT   -PM rounds: SBP 140s remains off pressors, AFIB HR 90s, saturating 99% on T piece, BULMARO drain w/ yellow output  -NPO after midnight, switch J for GJ   -2g magnesium     DAY  - GI consult to exchange to GJ  - remove liz  -Tube study done, GI consuled to replace J  - Inhaled NS 3% added  - Lasix 20  - 30Kphos, 1K rider, 2g Mg  - BULMARO bilirubin 9.7 from 5/18  - Plan for J to GJ replacement tommorrow    [X] A ten-point review of systems was negative except as expressed in note.  [X] History was obtained from patient. If unable to participate in their care, history obtained from review of the chart and collateral sources of information.  ============================================================      PATRICIA SPRGAUE   [id number]   [birth year]    74yF  ============================================================   DATE OF INITIAL SICU/SDU CONSULT: 25    INDICATION FOR SICU CONSULT:  perforated duodenal ulcer      SICU COURSE EVENTS :   - admitted to SICU service   - s/p OR for exploratory laparotomy, started remdesivir for covid +, echo ordered  5/15 - Echo showing EF 30-35%, cardiology consulted. Pending RTOR. Remains on vasopressors, weaning.  : S/p Exploratory laparotomy, bilroth 2, J tube placement. Episode of wheezing. Changed to meropenem per ID. AFIB slow ventricular response HR 40s-60s, remains on low dose pressors   : Tolerating T piece, trickle feed started via J tube, s/p 1u PRBC to wean off levophed, remains on vasopressin, IVL  : Midodrine 10mg q8 started, weaned off levophed. BULMARO drain bilious output > strict NPO, f/u fluid composition   : CTAP - No evidence of drainable fluid collection.   : Trickle feeds restarted but discontinued 2/2 high gastric residual, NGT kep to suction. Caspofungin discontinued, narrowed to diclucan. IR consult for PICC line.   : GI consulted for GJ replacement    24HOUR EVENTS    NIGHT   -PM rounds: SBP 140s remains off pressors, AFIB HR 90s, saturating 99% on T piece, BULMARO drain w/ yellow output  -NPO after midnight, switch J for GJ   -2g magnesium     DAY  - GI consult to exchange to GJ  - remove liz  -Tube study done, GI consuled to replace J  - Inhaled NS 3% added  - Lasix 20  - 30Kphos, 1K rider, 2g Mg  - BULMARO bilirubin 9.7 from   - Plan for J to GJ replacement tommorrow    [X] A ten-point review of systems was negative except as expressed in note.  [X] History was obtained from patient. If unable to participate in their care, history obtained from review of the chart and collateral sources of information.  ============================================================   Daily     Daily Weight in k.1 (22 May 2025 06:00)  Diet, NPO (25 @ 23:37)    CURRENT MEDS:  Neurologic Medications  acetaminophen   IVPB .. 1000 milliGRAM(s) IV Intermittent once  HYDROmorphone  Injectable 0.25 milliGRAM(s) IV Push every 6 hours PRN Severe Pain (7 - 10)    Respiratory Medications  albuterol    90 MICROgram(s) HFA Inhaler 2 Puff(s) Inhalation every 6 hours  ipratropium 17 MICROgram(s) HFA Inhaler 1 Puff(s) Inhalation every 6 hours  sodium chloride 3%  Inhalation 4 milliLiter(s) Inhalation every 12 hours    Cardiovascular Medications    Gastrointestinal Medications  dextrose 5% + sodium chloride 0.45%. 1000 milliLiter(s) IV Continuous <Continuous>  pantoprazole  Injectable 40 milliGRAM(s) IV Push every 12 hours  sodium chloride 0.9% lock flush 10 milliLiter(s) IV Push every 1 hour PRN Pre/post blood products, medications, blood draw, and to maintain line patency    Genitourinary Medications    Hematologic/Oncologic Medications  enoxaparin Injectable 40 milliGRAM(s) SubCutaneous every 24 hours    Antimicrobial/Immunologic Medications  fluconAZOLE IVPB 200 milliGRAM(s) IV Intermittent every 24 hours  meropenem  IVPB 1000 milliGRAM(s) IV Intermittent every 8 hours    Endocrine/Metabolic Medications    Topical/Other Medications  chlorhexidine 2% Cloths 1 Application(s) Topical <User Schedule>  nystatin Powder 1 Application(s) Topical two times a day    ICU Vital Signs Last 24 Hrs  T(C): 35.8 (22 May 2025 03:00), Max: 35.9 (21 May 2025 12:00)  T(F): 96.4 (22 May 2025 03:00), Max: 96.6 (21 May 2025 12:00)  HR: 78 (22 May 2025 08:14) (68 - 96)  BP: --  BP(mean): --  ABP: 127/64 (22 May 2025 06:00) (117/51 - 147/67)  ABP(mean): 88 (22 May 2025 06:00) (73 - 96)  RR: 16 (22 May 2025 08:15) (4 - 49)  SpO2: 100% (22 May 2025 08:15) (93% - 100%)    O2 Parameters below as of 22 May 2025 08:15  Patient On (Oxygen Delivery Method): T-piece    O2 Concentration (%): 40      Mode: standby      I&O's Summary    21 May 2025 07:01  -  22 May 2025 07:00  --------------------------------------------------------  IN: 0 mL / OUT: 2940 mL / NET: -890 mL      I&O's Detail    21 May 2025 07:01  -  22 May 2025 07:00  --------------------------------------------------------  IN:    dextrose 5% + sodium chloride 0.45%: 1200 mL    IV PiggyBack: 200 mL    IV PiggyBack: 500 mL    IV PiggyBack: 50 mL    IV PiggyBack: 100 mL  Total IN: 0 mL    OUT:    Bulb (mL): 690 mL    Nasogastric/Oral tube (mL): 0 mL    Ureteral Catheter (mL): 2250 mL  Total OUT: 2940 mL    Total NET: -890 mL    PHYSICAL EXAM:     a&ox3  follows commands, SEPULVEDA  no acute distress  equal chest rise b/l, on T piece  patient refused abdominal exam at this time  NGT to suction with light bilious output  extremities soft  urinary cath in place PATRICIA SPRAGUE   [id number]   [birth year]    74yF  ============================================================   DATE OF INITIAL SICU/SDU CONSULT: 25    INDICATION FOR SICU CONSULT:  perforated duodenal ulcer      SICU COURSE EVENTS :   - admitted to SICU service   - s/p OR for exploratory laparotomy, started remdesivir for covid +, echo ordered  5/15 - Echo showing EF 30-35%, cardiology consulted. Pending RTOR. Remains on vasopressors, weaning.  : S/p Exploratory laparotomy, bilroth 2, J tube placement. Episode of wheezing. Changed to meropenem per ID. AFIB slow ventricular response HR 40s-60s, remains on low dose pressors   : Tolerating T piece, trickle feed started via J tube, s/p 1u PRBC to wean off levophed, remains on vasopressin, IVL  : Midodrine 10mg q8 started, weaned off levophed. BULMARO drain bilious output > strict NPO, f/u fluid composition   : CTAP - No evidence of drainable fluid collection.   : Trickle feeds restarted but discontinued 2/2 high gastric residual, NGT kep to suction. Caspofungin discontinued, narrowed to diclucan.  : GI consulted for GJ replacement    24HOUR EVENTS    NIGHT   -PM rounds: SBP 140s remains off pressors, AFIB HR 90s, saturating 99% on T piece, BULMARO drain w/ yellow output  -NPO after midnight, switch J for GJ   -2g magnesium     DAY  - GI consult to exchange to GJ  - remove liz  -Tube study done, GI consuled to replace J  - Inhaled NS 3% added  - Lasix 20  - 30Kphos, 1K rider, 2g Mg  - BULMARO bilirubin 9.7 from   - Plan for J to GJ replacement tommorrow    [X] A ten-point review of systems was negative except as expressed in note.  [X] History was obtained from patient. If unable to participate in their care, history obtained from review of the chart and collateral sources of information.  ============================================================   Daily     Daily Weight in k.1 (22 May 2025 06:00)  Diet, NPO (25 @ 23:37)    CURRENT MEDS:  Neurologic Medications  acetaminophen   IVPB .. 1000 milliGRAM(s) IV Intermittent once  HYDROmorphone  Injectable 0.25 milliGRAM(s) IV Push every 6 hours PRN Severe Pain (7 - 10)    Respiratory Medications  albuterol    90 MICROgram(s) HFA Inhaler 2 Puff(s) Inhalation every 6 hours  ipratropium 17 MICROgram(s) HFA Inhaler 1 Puff(s) Inhalation every 6 hours  sodium chloride 3%  Inhalation 4 milliLiter(s) Inhalation every 12 hours    Cardiovascular Medications    Gastrointestinal Medications  dextrose 5% + sodium chloride 0.45%. 1000 milliLiter(s) IV Continuous <Continuous>  pantoprazole  Injectable 40 milliGRAM(s) IV Push every 12 hours  sodium chloride 0.9% lock flush 10 milliLiter(s) IV Push every 1 hour PRN Pre/post blood products, medications, blood draw, and to maintain line patency    Genitourinary Medications    Hematologic/Oncologic Medications  enoxaparin Injectable 40 milliGRAM(s) SubCutaneous every 24 hours    Antimicrobial/Immunologic Medications  fluconAZOLE IVPB 200 milliGRAM(s) IV Intermittent every 24 hours  meropenem  IVPB 1000 milliGRAM(s) IV Intermittent every 8 hours    Endocrine/Metabolic Medications    Topical/Other Medications  chlorhexidine 2% Cloths 1 Application(s) Topical <User Schedule>  nystatin Powder 1 Application(s) Topical two times a day    ICU Vital Signs Last 24 Hrs  T(C): 35.8 (22 May 2025 03:00), Max: 35.9 (21 May 2025 12:00)  T(F): 96.4 (22 May 2025 03:00), Max: 96.6 (21 May 2025 12:00)  HR: 78 (22 May 2025 08:14) (68 - 96)  BP: --  BP(mean): --  ABP: 127/64 (22 May 2025 06:00) (117/51 - 147/67)  ABP(mean): 88 (22 May 2025 06:00) (73 - 96)  RR: 16 (22 May 2025 08:15) (4 - 49)  SpO2: 100% (22 May 2025 08:15) (93% - 100%)    O2 Parameters below as of 22 May 2025 08:15  Patient On (Oxygen Delivery Method): T-piece    O2 Concentration (%): 40      Mode: standby      I&O's Summary    21 May 2025 07:01  -  22 May 2025 07:00  --------------------------------------------------------  IN: 2050 mL / OUT: 2940 mL / NET: -890 mL      I&O's Detail    21 May 2025 07:01  -  22 May 2025 07:00  --------------------------------------------------------  IN:    dextrose 5% + sodium chloride 0.45%: 1200 mL    IV PiggyBack: 200 mL    IV PiggyBack: 500 mL    IV PiggyBack: 50 mL    IV PiggyBack: 100 mL  Total IN: 2050 mL    OUT:    Bulb (mL): 690 mL    Nasogastric/Oral tube (mL): 0 mL    Ureteral Catheter (mL): 2250 mL  Total OUT: 2940 mL    Total NET: -890 mL    PHYSICAL EXAM:     a&ox3  follows commands, SEPULVEDA  no acute distress  equal chest rise b/l, on T piece  patient refused abdominal exam at this time, per RN abdomen soft and nontender, BULMARO drain with serous output  NGT to suction with light bilious output  extremities soft  urinary cath in place

## 2025-05-22 NOTE — PROGRESS NOTE ADULT - ASSESSMENT
74y old Female with above PMHx, s/p exlap and second look laparotomy with billroth II and replacement of GJ    Plan:  - SICU management   --GI for GJ placement (removal of G)  -possible downgrade to SDU  -COVID precautions until 5/23    SURGERY SPECTRA: [id number]

## 2025-05-23 LAB
ANION GAP SERPL CALC-SCNC: 6 MMOL/L — LOW (ref 7–14)
BUN SERPL-MCNC: 13 MG/DL — SIGNIFICANT CHANGE UP (ref 10–20)
CALCIUM SERPL-MCNC: 8.8 MG/DL — SIGNIFICANT CHANGE UP (ref 8.4–10.5)
CHLORIDE SERPL-SCNC: 111 MMOL/L — HIGH (ref 98–110)
CO2 SERPL-SCNC: 26 MMOL/L — SIGNIFICANT CHANGE UP (ref 17–32)
CREAT SERPL-MCNC: 0.7 MG/DL — SIGNIFICANT CHANGE UP (ref 0.7–1.5)
EGFR: 92 ML/MIN/1.73M2 — SIGNIFICANT CHANGE UP
EGFR: 92 ML/MIN/1.73M2 — SIGNIFICANT CHANGE UP
GLUCOSE SERPL-MCNC: 91 MG/DL — SIGNIFICANT CHANGE UP (ref 70–99)
POTASSIUM SERPL-MCNC: 4.1 MMOL/L — SIGNIFICANT CHANGE UP (ref 3.5–5)
POTASSIUM SERPL-SCNC: 4.1 MMOL/L — SIGNIFICANT CHANGE UP (ref 3.5–5)
SODIUM SERPL-SCNC: 143 MMOL/L — SIGNIFICANT CHANGE UP (ref 135–146)

## 2025-05-23 PROCEDURE — 71045 X-RAY EXAM CHEST 1 VIEW: CPT | Mod: 26

## 2025-05-23 PROCEDURE — 99233 SBSQ HOSP IP/OBS HIGH 50: CPT

## 2025-05-23 PROCEDURE — 99291 CRITICAL CARE FIRST HOUR: CPT | Mod: 24,25

## 2025-05-23 PROCEDURE — 36573 INSJ PICC RS&I 5 YR+: CPT | Mod: LT

## 2025-05-23 PROCEDURE — 99232 SBSQ HOSP IP/OBS MODERATE 35: CPT

## 2025-05-23 PROCEDURE — 71045 X-RAY EXAM CHEST 1 VIEW: CPT | Mod: 26,77

## 2025-05-23 RX ORDER — SODIUM CHLORIDE 9 G/1000ML
1000 INJECTION, SOLUTION INTRAVENOUS
Refills: 0 | Status: DISCONTINUED | OUTPATIENT
Start: 2025-05-23 | End: 2025-05-24

## 2025-05-23 RX ORDER — FUROSEMIDE 10 MG/ML
20 INJECTION INTRAMUSCULAR; INTRAVENOUS ONCE
Refills: 0 | Status: COMPLETED | OUTPATIENT
Start: 2025-05-23 | End: 2025-05-23

## 2025-05-23 RX ORDER — POTASSIUM PHOSPHATE, MONOBASIC POTASSIUM PHOSPHATE, DIBASIC INJECTION, 236; 224 MG/ML; MG/ML
30 SOLUTION, CONCENTRATE INTRAVENOUS ONCE
Refills: 0 | Status: COMPLETED | OUTPATIENT
Start: 2025-05-23 | End: 2025-05-23

## 2025-05-23 RX ORDER — MELATONIN 5 MG
5 TABLET ORAL AT BEDTIME
Refills: 0 | Status: DISCONTINUED | OUTPATIENT
Start: 2025-05-23 | End: 2025-05-24

## 2025-05-23 RX ORDER — SODIUM/POT/MAG/CALC/CHLOR/ACET 35-20-5MEQ
1 VIAL (ML) INTRAVENOUS
Refills: 0 | Status: DISCONTINUED | OUTPATIENT
Start: 2025-05-23 | End: 2025-05-23

## 2025-05-23 RX ORDER — MAGNESIUM SULFATE 500 MG/ML
1 SYRINGE (ML) INJECTION ONCE
Refills: 0 | Status: COMPLETED | OUTPATIENT
Start: 2025-05-23 | End: 2025-05-23

## 2025-05-23 RX ADMIN — MEROPENEM 100 MILLIGRAM(S): 1 INJECTION INTRAVENOUS at 23:02

## 2025-05-23 RX ADMIN — ENOXAPARIN SODIUM 40 MILLIGRAM(S): 100 INJECTION SUBCUTANEOUS at 10:36

## 2025-05-23 RX ADMIN — Medication 2 PUFF(S): at 07:59

## 2025-05-23 RX ADMIN — POTASSIUM PHOSPHATE, MONOBASIC POTASSIUM PHOSPHATE, DIBASIC INJECTION, 83.33 MILLIMOLE(S): 236; 224 SOLUTION, CONCENTRATE INTRAVENOUS at 00:52

## 2025-05-23 RX ADMIN — Medication 4 MILLILITER(S): at 01:31

## 2025-05-23 RX ADMIN — Medication 4 MILLILITER(S): at 21:05

## 2025-05-23 RX ADMIN — Medication 1 PUFF(S): at 07:59

## 2025-05-23 RX ADMIN — Medication 58 EACH: at 21:08

## 2025-05-23 RX ADMIN — Medication 4 MILLILITER(S): at 08:00

## 2025-05-23 RX ADMIN — Medication 2 PUFF(S): at 20:41

## 2025-05-23 RX ADMIN — Medication 2 PUFF(S): at 13:08

## 2025-05-23 RX ADMIN — Medication 100 GRAM(S): at 00:24

## 2025-05-23 RX ADMIN — Medication 1 PUFF(S): at 20:41

## 2025-05-23 RX ADMIN — Medication 0.25 MILLIGRAM(S): at 08:09

## 2025-05-23 RX ADMIN — Medication 1 APPLICATION(S): at 05:21

## 2025-05-23 RX ADMIN — FUROSEMIDE 20 MILLIGRAM(S): 10 INJECTION INTRAMUSCULAR; INTRAVENOUS at 10:37

## 2025-05-23 RX ADMIN — Medication 1 PUFF(S): at 13:08

## 2025-05-23 RX ADMIN — NYSTATIN 1 APPLICATION(S): 100000 CREAM TOPICAL at 05:22

## 2025-05-23 RX ADMIN — Medication 40 MILLIGRAM(S): at 05:21

## 2025-05-23 RX ADMIN — MEROPENEM 100 MILLIGRAM(S): 1 INJECTION INTRAVENOUS at 05:22

## 2025-05-23 RX ADMIN — Medication 1 PUFF(S): at 05:09

## 2025-05-23 RX ADMIN — Medication 0.25 MILLIGRAM(S): at 07:54

## 2025-05-23 RX ADMIN — Medication 5 MILLIGRAM(S): at 23:02

## 2025-05-23 RX ADMIN — Medication 1 PUFF(S): at 01:35

## 2025-05-23 RX ADMIN — Medication 2 PUFF(S): at 05:09

## 2025-05-23 RX ADMIN — MEROPENEM 100 MILLIGRAM(S): 1 INJECTION INTRAVENOUS at 15:19

## 2025-05-23 RX ADMIN — Medication 2 PUFF(S): at 01:36

## 2025-05-23 NOTE — PROGRESS NOTE ADULT - SUBJECTIVE AND OBJECTIVE BOX
GENERAL SURGERY PROGRESS NOTE    Patient: PATRICIA SPRAGUE , 74y ([birth year])Female   MRN: [id number]  Location: [address]  Visit: 05-13-25 Inpatient  Date: 05-23-25 @ 07:49    Hospital Day #: 11  Post-Op Day #: 9    Procedure/Dx/Injuries:  perf duodenal ulcer s/p ex lap, antrectomy, bilroth II, J tube    Events of past 24 hours: stable, afebrile, holding feeds for GI procedure     PAST MEDICAL & SURGICAL HISTORY:  HTN (hypertension)  Diabetes mellitus  Obesity  Gastroesophageal reflux disease  Active asthma  Generalized OA  Afib  H/O hernia repair  2011 and revised in 2013  History of cholecystectomy  Status post debridement  H/O tracheostomy  S/P gastrostomy  S/P jejunostomy    Vitals:   T(F): 96 (05-23-25 @ 04:00), Max: 97 (05-22-25 @ 20:00)  HR: 96 (05-23-25 @ 07:00)  BP: 123/76 (05-23-25 @ 07:00)  RR: 36 (05-23-25 @ 07:00)  SpO2: 100% (05-23-25 @ 07:00)  Mode: standby    Diet, NPO      Fluids:     I & O's:    05-22-25 @ 07:01  -  05-23-25 @ 07:00  --------------------------------------------------------  IN:    dextrose 5% + sodium chloride 0.45%: 1200 mL    IV PiggyBack: 50 mL    IV PiggyBack: 100 mL    IV PiggyBack: 100 mL    IV PiggyBack: 499.8 mL    IV PiggyBack: 100 mL  Total IN: 2049.8 mL    OUT:    Bulb (mL): 355 mL    Nasogastric/Oral tube (mL): 0 mL    Ureteral Catheter (mL): 1120 mL  Total OUT: 1475 mL    Total NET: 574.8 mL      PHYSICAL EXAM:  General: NAD, AAOx3, calm and cooperative  HEENT: NCAT, MAYCO, EOMI, Trachea ML, Neck supple  Cardiac: RRR S1, S2, no Murmurs, rubs or gallops  Respiratory: CTAB, normal respiratory effort, on NC  Abdomen: Soft, non-distended, non-tender  Vascular: Pulses 2+ throughout, extremities well perfused  Skin: Warm/dry, normal color, no jaundice      MEDICATIONS  (STANDING):  acetaminophen   IVPB .. 1000 milliGRAM(s) IV Intermittent once  albuterol    90 MICROgram(s) HFA Inhaler 2 Puff(s) Inhalation every 6 hours  chlorhexidine 2% Cloths 1 Application(s) Topical <User Schedule>  dextrose 5% + sodium chloride 0.45%. 1000 milliLiter(s) (50 mL/Hr) IV Continuous <Continuous>  enoxaparin Injectable 40 milliGRAM(s) SubCutaneous every 24 hours  fluconAZOLE IVPB 200 milliGRAM(s) IV Intermittent every 24 hours  ipratropium 17 MICROgram(s) HFA Inhaler 1 Puff(s) Inhalation every 6 hours  melatonin 5 milliGRAM(s) Oral at bedtime  meropenem  IVPB 1000 milliGRAM(s) IV Intermittent every 8 hours  nystatin Powder 1 Application(s) Topical two times a day  pantoprazole  Injectable 40 milliGRAM(s) IV Push every 12 hours  sodium chloride 3%  Inhalation 4 milliLiter(s) Inhalation every 12 hours    MEDICATIONS  (PRN):  HYDROmorphone  Injectable 0.25 milliGRAM(s) IV Push every 6 hours PRN Severe Pain (7 - 10)  sodium chloride 0.9% lock flush 10 milliLiter(s) IV Push every 1 hour PRN Pre/post blood products, medications, blood draw, and to maintain line patency      DVT PROPHYLAXIS: enoxaparin Injectable 40 milliGRAM(s) SubCutaneous every 24 hours    GI PROPHYLAXIS: pantoprazole  Injectable 40 milliGRAM(s) IV Push every 12 hours    ANTICOAGULATION:   ANTIBIOTICS:  fluconAZOLE IVPB 200 milliGRAM(s)  meropenem  IVPB 1000 milliGRAM(s)      LAB/STUDIES:  Labs:  CAPILLARY BLOOD GLUCOSE      POCT Blood Glucose.: 93 mg/dL (23 May 2025 05:21)  POCT Blood Glucose.: 86 mg/dL (22 May 2025 22:57)  POCT Blood Glucose.: 87 mg/dL (22 May 2025 17:19)  POCT Blood Glucose.: 94 mg/dL (22 May 2025 12:08)                          8.9    10.20 )-----------( 236      ( 22 May 2025 23:05 )             27.9         05-22    144  |  111[H]  |  14  ----------------------------<  92  3.7   |  24  |  0.7      Calcium: 9.4 mg/dL (05-22-25 @ 23:05)      LFTs:             4.1  | 0.4  | 10       ------------------[91      ( 21 May 2025 23:28 )  2.1  | 0.2  | 9           Lipase:x      Amylase:x           ABG - ( 19 May 2025 04:13 )  pH: 7.39  /  pCO2: 38    /  pO2: 101   / HCO3: 23    / Base Excess: -1.8  /  SaO2: 98.0      ABG - ( 18 May 2025 04:12 )  pH: 7.42  /  pCO2: 33    /  pO2: 88    / HCO3: 21    / Base Excess: -2.6  /  SaO2: 98.1      ABG - ( 17 May 2025 08:44 )  pH: 7.44  /  pCO2: 31    /  pO2: 158   / HCO3: 21    / Base Excess: -2.6  /  SaO2: 99.1        Coags:     15.90  ----< 1.34    ( 21 May 2025 23:28 )     35.0      Urinalysis Basic - ( 22 May 2025 23:05 )    Color: x / Appearance: x / SG: x / pH: x  Gluc: 92 mg/dL / Ketone: x  / Bili: x / Urobili: x   Blood: x / Protein: x / Nitrite: x   Leuk Esterase: x / RBC: x / WBC x   Sq Epi: x / Non Sq Epi: x / Bacteria: x      IMAGING:

## 2025-05-23 NOTE — PROGRESS NOTE ADULT - ASSESSMENT
74y old Female with above PMHx, s/p exlap and second look laparotomy with billroth II and placement of J tube    Plan:  - off isolation precautions today   - NPO for GI procedure ( replacing J tube for GJ)   - continue abx   - rest of care per SICU     SURGERY SPECTRA: [id number]

## 2025-05-23 NOTE — PROCEDURE NOTE - NSPOSTPRCRAD_GEN_A_CORE
CONFIRMED ON CXR/central line located in the superior vena cava/post-procedure radiography performed
fluoroscopy/line in appropriate postion/ultrasound

## 2025-05-23 NOTE — PROGRESS NOTE ADULT - ASSESSMENT
Pt is 81F PMHx HTN, a-fib (on xarelto), solitary kidney, DM, GERD, prior open cholecystectomy c/b suture granulomas s/p 2019 abdominal wall debridements by Dr. Banda, respiratory distress with prolonged ventilation during 11/2024 admission, s/p 11/19 tracheostomy and gastrostomy and feeding jejunostomy tube by Dr. Champion, ya catheter dependence who presented to the ED from NH on 5/13 with 4 days progressively worsening abdominal pain. Patient found to have perforated peptic ulcer and s/p ex-lap.     Discussed with patient's daughter, Cecilia, regarding patient's current medical course. She recently received a medical update and is aware of what is happening with the patient. She is ok with the current plan of care and wants to continue current course of treatment.    Plan:  - plan for downgrade to stepdown  - family would like ongoing medical/surgical management    Palliative care will continue to follow.   Please call [phone] with questions or concerns 24/7.     Reviewed documentation from: surgery, GI

## 2025-05-23 NOTE — PROCEDURE NOTE - NSPROCDETAILS_GEN_ALL_CORE
location identified, draped/prepped, sterile technique used, needle inserted/introduced/positive blood return obtained via catheter/connected to a pressurized flush line/sutured in place/Seldinger technique/all materials/supplies accounted for at end of procedure
sterile technique, indwelling urinary device inserted
location identified, draped/prepped, sterile technique used/sterile technique, catheter placed/supine position/ultrasound guidance
guidewire recovered/lumen(s) aspirated and flushed/sterile dressing applied/sterile technique, catheter placed/ultrasound guidance with use of sterile gel and probe cove

## 2025-05-23 NOTE — PROCEDURE NOTE - ADDITIONAL PROCEDURE DETAILS
5Fr DL 42cm via L bas v to SVC.  May be used immediately.
GUIDEWIRE VISUALIZED BY DAVIDSON MAI
[facility] called to exchange Coude Catheter on chronic Burger patient.   pt. seen and examined, old 16 Fr Catheter ( regular) 30 cc balloon removed, in sterile technique 14 Fr regular catheter placed with no difficulty, 10 cc balloon inflated. + return of yellow urine to the catheter. Pt. tolerated procedure well left in NAD. Cont. care as per SICU.
Guidewire visualized

## 2025-05-23 NOTE — PROGRESS NOTE ADULT - ASSESSMENT
74F found to have perforated peptic ulcer now s/p ex lap, distal antrectomy, abthera placement requiring vasopressors. RTOR 5/16 s/p second look laparotomy, Billroth II reconstruction, gastrojejunostomy tube replaced with a jejunal feeding tube, abdomen closed at the end of the case.     NEUROLOGICAL:  #Acute pain  - IV APAP while NPO   - Dilaudid 0.25 PRN    RESPIRATORY:   #Respiratory failure, s/p tracheostomy (11/24)    - now with 8 portex cuffed trach (switched in OR on 5/14)    - tolerating T- piece x 24 hrs (5/17)    - continue duonebs   #Decreased small bibasilar opacities/atelectasis, right greater than left on CT  - 5/16: s/p deep suctioning by anesthesia w/ copious secretions   #h/o asthma  - atrovent and albuterol   #Increased secretions  - 3% inhaled NS  #Activity   - increase as tolerated     CARDS:   #Nonsustained Vtach - self resolved   - cards consult: management of septic shock. Start short acting AC for afib. Start beta blockers once off pressors   #hypotension 2/2 sepsis   - off vasopressors since 5/19  - MAP >65  - Holding midodrine 10 mg Q8h while NPO   #hx afib  - transiently was in AFIB slow ventricular response (HR 40s-60s), remains on low dose pressors   - holding home Xarelto 15 QD  #hx HTN  - holding home losartan 100QD  #elevated troponin  - 143 > 84, no longer trending  #TTE 11/24: EF 71%. Mild-mod AS. Trivial pericardial effusion.   - Echo 5/15: EF 30-35%, multiple left ventricular motion wall abnormalities. Possible Takotsubo vs ischemic. Global left ventricular systolic function.     GASTROINTESTINAL/NUTRITION:   #perforated prepyloric gastric ulcer s/p exploratory laparotomy antrectomy and D1 stapled off with temporary abdominal closure and abthera in place  - 5/16: RTOR s/p bilroth 2, J tube placement, RLQ maliha drain by anastomosis extending to duodenal stump  - 5/18: BULMARO drain now w/ bilious output > pt made strict NPO, BULMARO drain bilirubin 9.7, LFTs WNL  #Diet, NPO strict   - J tube study performed 5/21 --> GI consulted for exchange of J for GJ, PLAN FOR 5/23   - aspiration precautions, HOB 30  #GI Prophylaxis    - protonix 40mg bid IV  #Bowel regimen    -holding    -last bowel movement 5/17    /RENAL:   #urine output in critically ill    -chronic indwelling ya > new ya exchanged 5/17  #Maintain euvolemia    -D51/2 NS @ 50cc/hr while NPO  #Hypomagnesemia, hypophosphatemia  - 30mmol KPO4, 1g Mg    #metabolic acidosis, resolved  - S/p Lasix 20mg x1 5/21  #NOLA (baseline creatine 0.6), improving   - nephrology consulted > no RRT at this time  #Hx of solitary kidney    HEME/ONC:   #DVT prophylaxis    -Lovenox    -SCDs  #anemia  - 1pRBC 5/17 to help wean off vasopressors      Labs: Hgb/Hct:  8.5/26.3  (05-21 @ 23:28)  -->,  8.9/27.9  (05-22 @ 23:05)  -->                      Platelets:  245  -->,  236  -->                 PTT/INR:        ID:  #perforated duodenal ulcer  - Meropenem (5/16 - )  - Fluconazole 200mg q24  - Nystatin powder for groin rash (5/15 - )  Completed antibiotics:  - zosyn (renal dosing) (5/14 - 5/16)  - caspofungin per primary team (5/14 - 5/20)  #COVID positive on admission    -  completed remdesivir 2 day course  - completing isolation precautions 5/23  #Cultures    - 5/13: blood culture: NGTD    - 5/13: blood culture: NGTD    - 5/13: urine culture: E Coli  - ID following    WBC -  9.03  --->>,  9.76  --->>,  10.20  --->>  Temp trend - 24hrs T(F): 97 (05-22 @ 20:00), Max: 97 (05-22 @ 20:00)    Current antibiotics - fluconAZOLE IVPB 200 IV Intermittent every 24 hours  meropenem  IVPB 1000 IV Intermittent every 8 hours, Stop order after: 5 Days    ENDOCRINE:  #glycemic monitoring    -D51/2 NS for persistent hypoglycemia     -FSG q6 while NPO    -Glucose goal 140-180. If above 180, will start corrective insulin sliding scale  #hypercalcemia  -  home cinacalcet 30mg bid, must be crushed well to not clog NGT - holding while NPO     MSK:  #Activity - increase as tolerated   #hx osteoporosis  - holding home Alendronate 70mg weekly on Thursdays while NPO    SKIN:  #DTI screening negative 5/21    - will continue to monitor daily skin changes      PALLIATIVE:  Currently full code, palliative consult       LINES/DRAINS:  PIV, Ya (replaced 5/17), J tube (5/16), 8 cuffed portex trach,     Discontinued lines: R radial arterial line (5/14- 5/22), LIJ CVC (5/14- 5/20)  ADVANCED DIRECTIVES:  Full Code    HCP/Emergency Contact- daughter Cecilia [phone]  INDICATION FOR SICU/SDU:  perforated peptic ulcer; mechanical ventilation/vasopressors     DISPO:  SICU. Case discussed with attending Dr. Pedersen   74F found to have perforated peptic ulcer now s/p ex lap, distal antrectomy, abthera placement requiring vasopressors. RTOR 5/16 s/p second look laparotomy, Billroth II reconstruction, gastrojejunostomy tube replaced with a jejunal feeding tube, abdomen closed at the end of the case.     NEUROLOGICAL:  #Acute pain  - IV APAP while NPO   - Dilaudid 0.25 PRN    RESPIRATORY:   #Respiratory failure, s/p tracheostomy (11/24)    - now with 8 portex cuffed trach (switched in OR on 5/14)    - tolerating T- piece x 24 hrs (5/17)    - continue duonebs   #Decreased small bibasilar opacities/atelectasis, right greater than left on CT  - 5/16: s/p deep suctioning by anesthesia w/ copious secretions   #h/o asthma  - atrovent and albuterol   #Increased secretions  - 3% inhaled NS  #Activity   - increase as tolerated     CARDS:   #Nonsustained Vtach - self resolved   - cards consult: management of septic shock. Start short acting AC for afib. Start beta blockers once off pressors   #hypotension 2/2 sepsis   - off vasopressors since 5/19  - MAP >65  - Holding midodrine 10 mg Q8h while NPO   #hx afib  - transiently was in AFIB slow ventricular response (HR 40s-60s), remains on low dose pressors   - holding home Xarelto 15 QD  #hx HTN  - holding home losartan 100QD  #elevated troponin  - 143 > 84, no longer trending  #TTE 11/24: EF 71%. Mild-mod AS. Trivial pericardial effusion.   - Echo 5/15: EF 30-35%, multiple left ventricular motion wall abnormalities. Possible Takotsubo vs ischemic. Global left ventricular systolic function.     GASTROINTESTINAL/NUTRITION:   #perforated prepyloric gastric ulcer s/p exploratory laparotomy antrectomy and D1 stapled off with temporary abdominal closure and abthera in place  - 5/16: RTOR s/p bilroth 2, J tube placement, RLQ maliha drain by anastomosis extending to duodenal stump  - 5/18: BULMARO drain now w/ bilious output > pt made strict NPO, BULMARO drain bilirubin 9.7, LFTs WNL  #Diet, NPO strict  - 5/23: pending PICC placement with IR, will start TPN tonight  - J tube study performed 5/21 --> GI consulted for exchange of J for GJ--> as of 5/23 GI is holding off on exchange for now  - aspiration precautions, HOB 30  #GI Prophylaxis    - protonix 40mg bid IV  #Bowel regimen    -holding    -last bowel movement 5/17    /RENAL:   #urine output in critically ill    -chronic indwelling ya > new ya exchanged 5/17  #Maintain euvolemia    -D51/2 NS @ 50cc/hr while NPO  #Hypomagnesemia, hypophosphatemia  - 30mmol KPO4, 1g Mg    #metabolic acidosis, resolved  - S/p Lasix 20mg x1 5/21  #NOLA (baseline creatine 0.6), improving   - nephrology consulted > no RRT at this time  #Hx of solitary kidney    HEME/ONC:   #DVT prophylaxis    -Lovenox    -SCDs  #anemia  - 1pRBC 5/17 to help wean off vasopressors      Labs: Hgb/Hct:  8.5/26.3  (05-21 @ 23:28)  -->,  8.9/27.9  (05-22 @ 23:05)  -->                      Platelets:  245  -->,  236  -->                 PTT/INR:        ID:  #perforated duodenal ulcer  - Meropenem (5/16 - )  - Fluconazole 200mg q24  - Nystatin powder for groin rash (5/15 - )  Completed antibiotics:  - zosyn (renal dosing) (5/14 - 5/16)  - caspofungin per primary team (5/14 - 5/20)  #COVID positive on admission    -  completed remdesivir 2 day course  - completing isolation precautions 5/23  #Cultures    - 5/13: blood culture: NGTD    - 5/13: blood culture: NGTD    - 5/13: urine culture: E Coli  - ID following    WBC -  9.03  --->>,  9.76  --->>,  10.20  --->>  Temp trend - 24hrs T(F): 97 (05-22 @ 20:00), Max: 97 (05-22 @ 20:00)    Current antibiotics - fluconAZOLE IVPB 200 IV Intermittent every 24 hours  meropenem  IVPB 1000 IV Intermittent every 8 hours, Stop order after: 5 Days    ENDOCRINE:  #glycemic monitoring    -D51/2 NS for persistent hypoglycemia     -FSG q6 while NPO    -Glucose goal 140-180. If above 180, will start corrective insulin sliding scale  #hypercalcemia  -  home cinacalcet 30mg bid, must be crushed well to not clog NGT - holding while NPO     MSK:  #Activity - increase as tolerated   #hx osteoporosis  - holding home Alendronate 70mg weekly on Thursdays while NPO    SKIN:  #DTI screening negative 5/23    - will continue to monitor daily skin changes      PALLIATIVE:  Currently full code, palliative consult       LINES/DRAINS:  PIV, Midline Catheter x2 (5/20-) Ya (replaced 5/17), J tube (5/16), 8 cuffed portex trach,     Discontinued lines: R radial arterial line (5/14- 5/22), LIJ CVC (5/14- 5/20)  ADVANCED DIRECTIVES:  Full Code    HCP/Emergency Contact- daughter Cecilia [phone]  INDICATION FOR SICU/SDU:  perforated peptic ulcer; mechanical ventilation/vasopressors     DISPO:  SICU. Case discussed with attending Dr. Pedersen

## 2025-05-23 NOTE — PROCEDURE NOTE - NSINFORMCONSENT_GEN_A_CORE
Benefits, risks, and possible complications of procedure explained to patient/caregiver who verbalized understanding and gave verbal consent.
Benefits, risks, and possible complications of procedure explained to patient/caregiver who verbalized understanding and gave written consent.
DAUGHTER JASBIR VIA PHONE/Benefits, risks, and possible complications of procedure explained to patient/caregiver who verbalized understanding and gave written consent.
Benefits, risks, and possible complications of procedure explained to patient/caregiver who verbalized understanding and gave verbal consent.

## 2025-05-23 NOTE — PROGRESS NOTE ADULT - SUBJECTIVE AND OBJECTIVE BOX
PATRICIA SPRAGUE   [id number]   [birth year]    74yF  ============================================================   DATE OF INITIAL SICU/SDU CONSULT: 05-13-25    INDICATION FOR SICU CONSULT:  perforated duodenal ulcer      SICU COURSE EVENTS :  05-13 - admitted to SICU service  05-14 - s/p OR for exploratory laparotomy, started remdesivir for covid +, echo ordered  5/15 - Echo showing EF 30-35%, cardiology consulted. Pending RTOR. Remains on vasopressors, weaning.  5/16: S/p Exploratory laparotomy, bilroth 2, J tube placement. Episode of wheezing. Changed to meropenem per ID. AFIB slow ventricular response HR 40s-60s, remains on low dose pressors   5/17: Tolerating T piece, trickle feed started via J tube, s/p 1u PRBC to wean off levophed, remains on vasopressin, IVL  5/18: Midodrine 10mg q8 started, weaned off levophed. BULMARO drain bilious output > strict NPO, f/u fluid composition   5/19: CTAP - No evidence of drainable fluid collection.   5/20: Trickle feeds restarted but discontinued 2/2 high gastric residual, NGT kep to suction. Caspofungin discontinued, narrowed to diclucan.  5/21: GI consulted for GJ replacement, OR 5/23    24HOUR EVENTS  5/22  NIGHT  - PM rounds: SBP 110s, . Pt refused physical exam  - 30mmol KPO4, 1g Mg    DAY  - COVID precautions until tomorrow  - no GI tube placement today, plan for tomorrow   - remove liz    [X] A ten-point review of systems was negative except as expressed in note.  [X] History was obtained from patient. If unable to participate in their care, history obtained from review of the chart and collateral sources of information.  ============================================================    PATRICIA SPRAGUE   [id number]   [birth year]    74yF  ============================================================   DATE OF INITIAL SICU/SDU CONSULT: 05-13-25    INDICATION FOR SICU CONSULT:  perforated duodenal ulcer      SICU COURSE EVENTS :  05-13 - admitted to SICU service  05-14 - s/p OR for exploratory laparotomy, started remdesivir for covid +, echo ordered  5/15 - Echo showing EF 30-35%, cardiology consulted. Pending RTOR. Remains on vasopressors, weaning.  5/16: S/p Exploratory laparotomy, bilroth 2, J tube placement. Episode of wheezing. Changed to meropenem per ID. AFIB slow ventricular response HR 40s-60s, remains on low dose pressors   5/17: Tolerating T piece, trickle feed started via J tube, s/p 1u PRBC to wean off levophed, remains on vasopressin, IVL  5/18: Midodrine 10mg q8 started, weaned off levophed. BULMARO drain bilious output > strict NPO, f/u fluid composition   5/19: CTAP - No evidence of drainable fluid collection.   5/20: Trickle feeds restarted but discontinued 2/2 high gastric residual, NGT kep to suction. Caspofungin discontinued, narrowed to diclucan.  5/21: GI consulted for GJ replacement, OR 5/23    24HOUR EVENTS  5/22  NIGHT  - PM rounds: SBP 110s, . Pt refused physical exam  - 30mmol KPO4, 1g Mg    DAY  - COVID precautions until tomorrow  - no GI tube placement today, plan for tomorrow   - remove liz    [X] A ten-point review of systems was negative except as expressed in note.  [X] History was obtained from patient. If unable to participate in their care, history obtained from review of the chart and collateral sources of information.  ============================================================     ACTIVE MEDICATIONS  acetaminophen   IVPB .. 1000 milliGRAM(s) IV Intermittent once  albuterol    90 MICROgram(s) HFA Inhaler 2 Puff(s) Inhalation every 6 hours  chlorhexidine 2% Cloths 1 Application(s) Topical <User Schedule>  dextrose 5% + sodium chloride 0.45%. 1000 milliLiter(s) IV Continuous <Continuous>  enoxaparin Injectable 40 milliGRAM(s) SubCutaneous every 24 hours  fluconAZOLE IVPB 200 milliGRAM(s) IV Intermittent every 24 hours  HYDROmorphone  Injectable 0.25 milliGRAM(s) IV Push every 6 hours PRN Severe Pain (7 - 10)  ipratropium 17 MICROgram(s) HFA Inhaler 1 Puff(s) Inhalation every 6 hours  melatonin 5 milliGRAM(s) Oral at bedtime  meropenem  IVPB 1000 milliGRAM(s) IV Intermittent every 8 hours  nystatin Powder 1 Application(s) Topical two times a day  pantoprazole  Injectable 40 milliGRAM(s) IV Push every 12 hours  sodium chloride 0.9% lock flush 10 milliLiter(s) IV Push every 1 hour PRN Pre/post blood products, medications, blood draw, and to maintain line patency  sodium chloride 3%  Inhalation 4 milliLiter(s) Inhalation every 12 hours     VITALS LAST 24 HOURS   T(F): 96.1 (05-23 @ 08:00), Max: 97 (05-22 @ 20:00)  HR: 87 (05-23 @ 08:00) (77 - 109)  BP: 121/70 (05-23 @ 08:00) (112/56 - 139/63)  BP(mean): 90 (05-23 @ 08:00) (78 - 94)  ABP: 141/65 (05-22 @ 11:00) (136/64 - 141/65)  ABP(mean): 91 (05-22 @ 11:00) (88 - 92)  RR: 17 (05-23 @ 08:00) (7 - 36)  SpO2: 100% (05-23 @ 08:00) (95% - 100%)  FLUID STATUS    05-22-25 @ 07:01  -  05-23-25 @ 07:00  --------------------------------------------------------  IN:    dextrose 5% + sodium chloride 0.45%: 1200 mL    IV PiggyBack: 50 mL    IV PiggyBack: 100 mL    IV PiggyBack: 100 mL    IV PiggyBack: 499.8 mL    IV PiggyBack: 100 mL  Total IN: 2049.8 mL    OUT:    Bulb (mL): 355 mL    Nasogastric/Oral tube (mL): 0 mL    Ureteral Catheter (mL): 1120 mL  Total OUT: 1475 mL    Total NET: 574.8 mL      05-23-25 @ 07:01  -  05-23-25 @ 08:32  --------------------------------------------------------  IN:    dextrose 5% + sodium chloride 0.45%: 50 mL  Total IN: 50 mL    OUT:    Bulb (mL): 40 mL    Ureteral Catheter (mL): 50 mL  Total OUT: 90 mL    Total NET: -40 mL        PHYSICAL EXAM:    Neuro: GCS 11T, follows commands, no acute distress  Resp: T- piece in place, equal chest rise b/l, no increased respiratory effort  Cardio: NSR on monitor  Abdomen: Patient declined abdominal examination. BULMARO in place with serous output. NGT in place at 55cm  Extremities: soft  : urinary cath in place, draining       PATRICIA SPRAGUE   [id number]   [birth year]    74yF  ============================================================   DATE OF INITIAL SICU/SDU CONSULT: 05-13-25    INDICATION FOR SICU CONSULT:  perforated duodenal ulcer      SICU COURSE EVENTS :  05-13 - admitted to SICU service  05-14 - s/p OR for exploratory laparotomy, started remdesivir for covid +, echo ordered  5/15 - Echo showing EF 30-35%, cardiology consulted. Pending RTOR. Remains on vasopressors, weaning.  5/16: S/p Exploratory laparotomy, bilroth 2, J tube placement. Episode of wheezing. Changed to meropenem per ID. AFIB slow ventricular response HR 40s-60s, remains on low dose pressors   5/17: Tolerating T piece, trickle feed started via J tube, s/p 1u PRBC to wean off levophed, remains on vasopressin, IVL  5/18: Midodrine 10mg q8 started, weaned off levophed. BULMARO drain bilious output > strict NPO, f/u fluid composition   5/19: CTAP - No evidence of drainable fluid collection.   5/20: Trickle feeds restarted but discontinued 2/2 high gastric residual, NGT kep to suction. Caspofungin discontinued, narrowed to diclucan.  5/21: GI consulted for GJ replacement, OR 5/23    24HOUR EVENTS  5/22  NIGHT  - PM rounds: SBP 110s, . Pt refused physical exam  - 30mmol KPO4, 1g Mg    DAY  - COVID precautions until tomorrow  - no GI tube placement today, plan for tomorrow   - remove liz    [X] A ten-point review of systems was negative except as expressed in note.  [X] History was obtained from patient. If unable to participate in their care, history obtained from review of the chart and collateral sources of information.  ============================================================     ACTIVE MEDICATIONS  acetaminophen   IVPB .. 1000 milliGRAM(s) IV Intermittent once  albuterol    90 MICROgram(s) HFA Inhaler 2 Puff(s) Inhalation every 6 hours  chlorhexidine 2% Cloths 1 Application(s) Topical <User Schedule>  dextrose 5% + sodium chloride 0.45%. 1000 milliLiter(s) IV Continuous <Continuous>  enoxaparin Injectable 40 milliGRAM(s) SubCutaneous every 24 hours  fluconAZOLE IVPB 200 milliGRAM(s) IV Intermittent every 24 hours  HYDROmorphone  Injectable 0.25 milliGRAM(s) IV Push every 6 hours PRN Severe Pain (7 - 10)  ipratropium 17 MICROgram(s) HFA Inhaler 1 Puff(s) Inhalation every 6 hours  melatonin 5 milliGRAM(s) Oral at bedtime  meropenem  IVPB 1000 milliGRAM(s) IV Intermittent every 8 hours  nystatin Powder 1 Application(s) Topical two times a day  pantoprazole  Injectable 40 milliGRAM(s) IV Push every 12 hours  sodium chloride 0.9% lock flush 10 milliLiter(s) IV Push every 1 hour PRN Pre/post blood products, medications, blood draw, and to maintain line patency  sodium chloride 3%  Inhalation 4 milliLiter(s) Inhalation every 12 hours     VITALS LAST 24 HOURS   T(F): 96.1 (05-23 @ 08:00), Max: 97 (05-22 @ 20:00)  HR: 87 (05-23 @ 08:00) (77 - 109)  BP: 121/70 (05-23 @ 08:00) (112/56 - 139/63)  BP(mean): 90 (05-23 @ 08:00) (78 - 94)  ABP: 141/65 (05-22 @ 11:00) (136/64 - 141/65)  ABP(mean): 91 (05-22 @ 11:00) (88 - 92)  RR: 17 (05-23 @ 08:00) (7 - 36)  SpO2: 100% (05-23 @ 08:00) (95% - 100%)  FLUID STATUS    05-22-25 @ 07:01  -  05-23-25 @ 07:00  --------------------------------------------------------  IN:    dextrose 5% + sodium chloride 0.45%: 1200 mL    IV PiggyBack: 50 mL    IV PiggyBack: 100 mL    IV PiggyBack: 100 mL    IV PiggyBack: 499.8 mL    IV PiggyBack: 100 mL  Total IN: 2049.8 mL    OUT:    Bulb (mL): 355 mL    Nasogastric/Oral tube (mL): 0 mL    Ureteral Catheter (mL): 1120 mL  Total OUT: 1475 mL    Total NET: 574.8 mL      05-23-25 @ 07:01  -  05-23-25 @ 08:32  --------------------------------------------------------  IN:    dextrose 5% + sodium chloride 0.45%: 50 mL  Total IN: 50 mL    OUT:    Bulb (mL): 40 mL    Ureteral Catheter (mL): 50 mL  Total OUT: 90 mL    Total NET: -40 mL        PHYSICAL EXAM:    Neuro: GCS 11T, follows commands, no acute distress  Resp: T- piece in place, equal chest rise b/l, no increased respiratory effort  Cardio: NSR on monitor  Abdomen: Patient declined abdominal examination. BULMARO in place with serous output. NGT in place at 55cm  Extremities: soft, mild edema present  : urinary cath in place, draining

## 2025-05-23 NOTE — PROGRESS NOTE ADULT - SUBJECTIVE AND OBJECTIVE BOX
HPI:  Pt is 81F PMHx HTN, a-fib (on xarelto), solitary kidney, DM, GERD, prior open cholecystectomy c/b suture granulomas s/p 2019 abdominal wall debridements by Dr. Banda, respiratory distress with prolonged ventilation during 11/2024 admission, s/p 11/19 tracheostomy and gastrostomy and feeding jejunostomy tube by Dr. Champion, burger catheter dependence who presented to the ED from NH on 5/13 with 4 days progressively worsening abdominal pain. Surgery consulted for imaging concerning for duodenal perforation. History limited by pt's minimally-verbal status. Spoke with daughter on phone, pt is full code at this time, (13 May 2025 21:02)    Patient found to have perforated peptic ulcer s/p ex-lap.   Presently intubated.       ITEMS NOT CHECKED ARE NOT PRESENT    SOCIAL HISTORY:   Significant other/partner[ ]  Children[x ]  Baptism/Spirituality:  Substance hx:  [ ]   Tobacco hx:  [ ]   Alcohol hx: [ ]   Living Situation: [ ]Home  [x ]Long term care  [ ]Rehab [ ]Other  Home Services: [ ] HHA [ ] Visting RN [ ] Hospice  Occupation:  Home Opioid hx:  [ ] Y [ ] N   I-Stop Reference No:     ADVANCE DIRECTIVES:    [x ] Full Code [ ] DNR  MOLST  [ ]  Living Will  [ ]   DECISION MAKER(s):  [ ] Health Care Proxy(s)  [x ] Surrogate(s)  [ ] Guardian           Name(s): Phone Number(s): children    BASELINE (I)ADL(s) (prior to admission):  [place]: [ ]Total  [ ] Moderate [ ]Dependent      Allergies    No Known Allergies    Intolerances    MEDICATIONS  (STANDING):  acetaminophen   IVPB .. 1000 milliGRAM(s) IV Intermittent once  albuterol    90 MICROgram(s) HFA Inhaler 2 Puff(s) Inhalation every 6 hours  chlorhexidine 2% Cloths 1 Application(s) Topical <User Schedule>  dextrose 5% + sodium chloride 0.45%. 1000 milliLiter(s) (50 mL/Hr) IV Continuous <Continuous>  enoxaparin Injectable 40 milliGRAM(s) SubCutaneous every 24 hours  fluconAZOLE IVPB 200 milliGRAM(s) IV Intermittent every 24 hours  ipratropium 17 MICROgram(s) HFA Inhaler 1 Puff(s) Inhalation every 6 hours  melatonin 5 milliGRAM(s) Oral at bedtime  meropenem  IVPB 1000 milliGRAM(s) IV Intermittent every 8 hours  nystatin Powder 1 Application(s) Topical two times a day  pantoprazole  Injectable 40 milliGRAM(s) IV Push every 12 hours  sodium chloride 3%  Inhalation 4 milliLiter(s) Inhalation every 12 hours    MEDICATIONS  (PRN):  HYDROmorphone  Injectable 0.25 milliGRAM(s) IV Push every 6 hours PRN Severe Pain (7 - 10)  sodium chloride 0.9% lock flush 10 milliLiter(s) IV Push every 1 hour PRN Pre/post blood products, medications, blood draw, and to maintain line patency      PRESENT SYMPTOMS: [x ]Unable to obtain due to poor mentation   Source if other than patient:  [ ]Family   [ ]Team     Pain: [ ]yes [ ]no  QOL impact -   Location -                    Aggravating factors -  Alleviating factors -   Quality -  Radiation -  Timing -   Severity (0-10 scale):  Minimal acceptable level (0-10 scale):     CPOT: 0   https://[email or web address]/getattachment/[email or web address]/Critical-Care-Pain-Observation-Tool-(CPOT)    PAIN AD Score:   [email or web address].[email or web address]/cog/painad.pdf     Dyspnea:                           [ ]None[ ]Mild [ ]Moderate [ ]Severe     Respiratory Distress Observation Scale (RDOS): 0  A score of 0 to 2 signifies little or no respiratory distress, 3 signifies mild distress, scores 4 to 6 indicate moderate distress, and scores greater than 7 signify severe distress  [email or web address]/[email or web address]/[email or web address]-mvucibstuvi-jclnupiy-lnkciuwtdsx-kinbz.pdf    Anxiety:                             [ ]None[ ]Mild [ ]Moderate [ ]Severe   Fatigue:                             [ ]None[ ]Mild [ ]Moderate [ ]Severe   Nausea:                             [ ]None[ ]Mild [ ]Moderate [ ]Severe   Loss of appetite:              [ ]None[ ]Mild [ ]Moderate [ ]Severe   Constipation:                    [ ]None[ ]Mild [ ]Moderate [ ]Severe    Other Symptoms:  [ ]All other review of systems negative     PHYSICAL EXAM:    ICU Vital Signs Last 24 Hrs  T(C): 35.6 (23 May 2025 08:00), Max: 36.1 (22 May 2025 16:00)  T(F): 96.1 (23 May 2025 08:00), Max: 97 (22 May 2025 20:00)  HR: 93 (23 May 2025 10:00) (77 - 109)  BP: 122/69 (23 May 2025 10:00) (108/61 - 139/63)  BP(mean): 90 (23 May 2025 10:00) (78 - 94)  ABP: --  ABP(mean): --  RR: 23 (23 May 2025 10:00) (11 - 36)  SpO2: 100% (23 May 2025 10:00) (95% - 100%)    O2 Parameters below as of 23 May 2025 10:00  Patient On (Oxygen Delivery Method): T-piece    O2 Concentration (%): 40    GENERAL:  [x ] No acute distress [ ]Lethargic  [ ]Unarousable  [ ]Verbal  [ ]Non-Verbal [ ]Cachexia    BEHAVIORAL/PSYCH:  [ ]Alert and Oriented x  [ ] Anxiety [ ] Delirium [ ] Agitation [x ] Calm   EYES: [x ] No scleral icterus [ ] Scleral icterus [ ] Closed  ENMT:  [ ]Dry mouth  [x ]No external oral lesions [ ] No external ear or nose lesions  CARDIOVASCULAR:  [ ]Regular [ ]Irregular [ ]Tachy [ ]Not Tachy  [ ]Jay [ ] Edema [ ] No edema  PULMONARY:  [ ]Tachypnea  [ ]Audible excessive secretions [x ] No labored breathing [ ] mildly labored breathing [ ] labored breathing  GASTROINTESTINAL: [ ]Soft  [ ]Distended  [ ]Not distended [ ]Non tender [ ]Tender  MUSCULOSKELETAL: [ ]No clubbing [ ] clubbing  [x ] No cyanosis [ ] cyanosis  NEUROLOGIC: [ ]No focal deficits  [ ]Follows commands  [ ]Does not follow commands  [x ]Cognitive impairment  [ ]Dysphagia  [ ]Dysarthria  [ ]Paresis   SKIN: [ ] Jaundiced [ ] Non-jaundiced [ ]Rash [ ]No Rash [ ] Warm [ ] Dry  MISC/LINES: [ ] ET tube [x ] Trach [ ]NGT/OGT [ ]PEG [ ]Burger    CRITICAL CARE:  [ ] Shock Present  [ ]Septic [ ]Cardiogenic [ ]Neurologic [ ]Hypovolemic  [ ]  Vasopressors [ ]  Inotropes   [ ]Respiratory failure present [ ]Mechanical ventilation [ ]Non-invasive ventilatory support [ ]High flow  [ ]Acute  [ ]Chronic [ ]Hypoxic  [ ]Hypercarbic [ ]Other  [ ]Other organ failure   Palliative Performance Status Version 2:      10   %    [email or web address]/[email or web address]_performance_scale_[email or web address].pdf    LABS: I have reviewed daily labs, including                          8.9    10.20 )-----------( 236      ( 22 May 2025 23:05 )             27.9     05-23    143  |  111[H]  |  13  ----------------------------<  91  4.1   |  26  |  0.7    Ca    8.8      23 May 2025 10:40  Phos  2.5     05-22  Mg     1.9     05-22    TPro  4.1[L]  /  Alb  2.1[L]  /  TBili  0.4  /  DBili  0.2  /  AST  10  /  ALT  9   /  AlkPhos  91  05-21    PT/INR - ( 21 May 2025 23:28 )   PT: 15.90 sec;   INR: 1.34 ratio         PTT - ( 21 May 2025 23:28 )  PTT:35.0 sec  Urinalysis Basic - ( 23 May 2025 10:40 )    Color: x / Appearance: x / SG: x / pH: x  Gluc: 91 mg/dL / Ketone: x  / Bili: x / Urobili: x   Blood: x / Protein: x / Nitrite: x   Leuk Esterase: x / RBC: x / WBC x   Sq Epi: x / Non Sq Epi: x / Bacteria: x            RADIOLOGY & ADDITIONAL STUDIES: I have independently reviewed new imaging, including      < from: Xray Chest 1 View- PORTABLE-Urgent (Xray Chest 1 View- PORTABLE-Urgent .) (05.23.25 @ 06:30) >  PROCEDURE DATE:  05/23/2025          INTERPRETATION:  CLINICAL HISTORY: Nasogastric tube adjustment.    COMPARISON: Chest radiograph dated  May 23, 2025.    TECHNIQUE: Portable frontal chest radiograph. Apices are excluded from   the film.    FINDINGS:    Support devices: Interval replacement of nasogastric tube, in appropriate   position.    Cardiac/mediastinum/hilum: Stable.    Lung parenchyma/Pleura: Unchanged bilateral opacities, left greater the   right. No pneumothorax.    Skeleton/soft tissues: Stable.      IMPRESSION:    1. Interval replacement of nasogastric tube, in appropriate position.  2. Unchanged bilateral opacities, left greater than right.    --- End of Report ---    < end of copied text >  PROTEIN CALORIE MALNUTRITION PRESENT: [ ]mild [ ]moderate [ ]severe [ ]underweight [ ]morbid obesity  https://www.[facility].org/vault/2440/web/files/ONC/Table_Clinical%20Characteristics%20to%20Document%20Malnutrition-White%20JV%20et%20al%351483.pdf    Height (cm): 167.6 (05-14-25 @ 00:10), 167.6 (04-11-25 @ 13:47), 167.6 (11-20-24 @ 20:17)  Weight (kg): 87 (05-14-25 @ 00:10), 82.1 (04-14-25 @ 09:41), 90.2 (11-20-24 @ 20:17)  BMI (kg/m2): 31 (05-14-25 @ 00:10), 29.2 (04-14-25 @ 09:41), 32.1 (04-11-25 @ 13:47)    [ ]PPSV2 < or = to 30% [ ]significant weight loss  [ ]poor nutritional intake  [ ]anasarca      [ ]Artificial Nutrition      Palliative Care Spiritual/Emotional Screening Tool Question  Severity (0-4):                    OR                    [ x] Unable to determine. Will assess at later time if appropriate.  Score of 2 or greater indicates recommendation of Chaplaincy and/or SW referral  Chaplaincy Referral: [ ] Yes [ ] Refused [ ] Following     Caregiver [place]:  [ ] Yes [ ] No    OR    [x ] Unable to determine. Will assess at later time if appropriate.  Social Work Referral [ ]  Patient and Family Centered Care Referral [ ]    Anticipatory Grief Present: [ ] Yes [ ] No    OR     [ x] Unable to determine. Will assess at later time if appropriate.  Social Work Referral [ ]  Patient and Family Centered Care Referral [ ]    REFERRALS:   [ ]Chaplaincy  [ ]Hospice  [ ]Child Life  [ ]Social Work  [ ]Case management [ ]Holistic Therapy     Palliative care education provided to patient and/or family

## 2025-05-23 NOTE — PROCEDURE NOTE - NSSITEPREP_SKIN_A_CORE
chlorhexidine
povidone iodine (if allergic to chlorhexidine)
chlorhexidine/Adherence to aseptic technique: hand hygiene prior to donning barriers (gown, gloves), don cap and mask, sterile drape over patient
chlorhexidine/Adherence to aseptic technique: hand hygiene prior to donning barriers (gown, gloves), don cap and mask, sterile drape over patient

## 2025-05-23 NOTE — CONSULT NOTE ADULT - SUBJECTIVE AND OBJECTIVE BOX
INTERVENTIONAL RADIOLOGY CONSULT:     Procedure Requested:     HPI:  Pt is 81F PMHx HTN, a-fib (on xarelto), solitary kidney, DM, GERD, prior open cholecystectomy c/b suture granulomas s/p 2019 abdominal wall debridements by Dr. Banda, respiratory distress with prolonged ventilation during 11/2024 admission, s/p 11/19 tracheostomy and gastrostomy and feeding jejunostomy tube by Dr. Champion, ya catheter dependence who presented to the ED from NH on 5/13 with 4 days progressively worsening abdominal pain. Surgery consulted for imaging concerning for duodenal perforation. History limited by pt's minimally-verbal status. Spoke with daughter on phone, pt is full code at this time, (13 May 2025 21:02)      PAST MEDICAL & SURGICAL HISTORY:  HTN (hypertension)      Diabetes mellitus      Obesity      Gastroesophageal reflux disease      Active asthma      Generalized OA      Afib      H/O hernia repair  2011 and revised in 2013      History of cholecystectomy      Status post debridement      H/O tracheostomy      S/P gastrostomy      S/P jejunostomy          MEDICATIONS  (STANDING):  acetaminophen   IVPB .. 1000 milliGRAM(s) IV Intermittent once  albuterol    90 MICROgram(s) HFA Inhaler 2 Puff(s) Inhalation every 6 hours  chlorhexidine 2% Cloths 1 Application(s) Topical <User Schedule>  dextrose 5% + sodium chloride 0.45%. 1000 milliLiter(s) (50 mL/Hr) IV Continuous <Continuous>  enoxaparin Injectable 40 milliGRAM(s) SubCutaneous every 24 hours  fluconAZOLE IVPB 200 milliGRAM(s) IV Intermittent every 24 hours  ipratropium 17 MICROgram(s) HFA Inhaler 1 Puff(s) Inhalation every 6 hours  melatonin 5 milliGRAM(s) Oral at bedtime  meropenem  IVPB 1000 milliGRAM(s) IV Intermittent every 8 hours  nystatin Powder 1 Application(s) Topical two times a day  pantoprazole  Injectable 40 milliGRAM(s) IV Push every 12 hours  sodium chloride 3%  Inhalation 4 milliLiter(s) Inhalation every 12 hours    MEDICATIONS  (PRN):  HYDROmorphone  Injectable 0.25 milliGRAM(s) IV Push every 6 hours PRN Severe Pain (7 - 10)  sodium chloride 0.9% lock flush 10 milliLiter(s) IV Push every 1 hour PRN Pre/post blood products, medications, blood draw, and to maintain line patency      Allergies    No Known Allergies    Intolerances          FAMILY HISTORY:      Physical Exam:   Vital Signs Last 24 Hrs  T(C): 35.6 (23 May 2025 08:00), Max: 36.1 (22 May 2025 16:00)  T(F): 96.1 (23 May 2025 08:00), Max: 97 (22 May 2025 20:00)  HR: 93 (23 May 2025 10:00) (77 - 109)  BP: 122/69 (23 May 2025 10:00) (108/61 - 139/63)  BP(mean): 90 (23 May 2025 10:00) (78 - 94)  RR: 23 (23 May 2025 10:00) (11 - 36)  SpO2: 100% (23 May 2025 10:00) (95% - 100%)    Parameters below as of 23 May 2025 10:00  Patient On (Oxygen Delivery Method): T-piece    O2 Concentration (%): 40      Labs:                         8.9    10.20 )-----------( 236      ( 22 May 2025 23:05 )             27.9     05-23    143  |  111[H]  |  13  ----------------------------<  91  4.1   |  26  |  0.7    Ca    8.8      23 May 2025 10:40  Phos  2.5     05-22  Mg     1.9     05-22    TPro  4.1[L]  /  Alb  2.1[L]  /  TBili  0.4  /  DBili  0.2  /  AST  10  /  ALT  9   /  AlkPhos  91  05-21    PT/INR - ( 21 May 2025 23:28 )   PT: 15.90 sec;   INR: 1.34 ratio         PTT - ( 21 May 2025 23:28 )  PTT:35.0 sec    Pertinent labs:                      8.9    10.20 )-----------( 236      ( 22 May 2025 23:05 )             27.9       05-23    143  |  111[H]  |  13  ----------------------------<  91  4.1   |  26  |  0.7    Ca    8.8      23 May 2025 10:40  Phos  2.5     05-22  Mg     1.9     05-22    TPro  4.1[L]  /  Alb  2.1[L]  /  TBili  0.4  /  DBili  0.2  /  AST  10  /  ALT  9   /  AlkPhos  91  05-21      PT/INR - ( 21 May 2025 23:28 )   PT: 15.90 sec;   INR: 1.34 ratio         PTT - ( 21 May 2025 23:28 )  PTT:35.0 sec    Radiology & Additional Studies:     Radiology imaging reviewed.       ASSESSMENT AND PLAN:        -NPO after midnight  -draw CBC/CMP/Coags prior to procedure  -hold anticoagulation until after procedure    Please call Interventional Radiology with questions or concerns:   - M-F [id number]: [phone]   - All other hours: [phone]

## 2025-05-23 NOTE — CHART NOTE - NSCHARTNOTEFT_GEN_A_CORE
GI NUTRITION SUPPORT TEAM  -  CONSULT NOTE     Pt is 81F PMHx HTN, a-fib (on xarelto), solitary kidney, DM, GERD, prior open cholecystectomy c/b suture granulomas s/p 2019 abdominal wall debridements by Dr. Banda, respiratory distress with prolonged ventilation during 11/2024 admission, s/p 11/19 tracheostomy and gastrostomy and feeding jejunostomy tube by Dr. Champion, ya catheter dependence who presented to the ED from NH on 5/13 with 4 days progressively worsening abdominal pain. Surgery consulted for imaging concerning for duodenal perforation. History limited by pt's minimally-verbal status. Spoke with daughter on phone, pt is full code at this time, (13 May 2025 21:02)      GI NUTRITION SUPPORT NOTE:    Presented with abdominal pain, imaging suggestive of duodenal perforation, taken to OR 5/14 and found prepyloric ulcer 4cm perforation anterior stomach. s/p antrectomy with D1 stapled off, temporary abdominal closure with Abthera placement due to HD instability. RTOR 5/16 s/p laparotomy, Billroth II 40cm from LOT with gastrojejunostomy tube placed with jejunal feeding tube (distal tip in jejunum, no gastric access), RLQ Gregorio drain by anastomosis extending to duodenal stump. Trickle feeds started via J-tube on 5/17 and on 5/18 found to have bilious output via BULMARO with bilirubin 9.7, kept strict NPO. 5/20 restarted trickle feeds with resultant high output via NGT, kept NGT to suction and feeds held. Jejunal tube found to have coiled back into stomach, planned replacement with GJ tube in ~1 wk. GI Nutrition consulted to start TPN.       REVIEW OF SYSTEMS:  Negative except as noted above.       PAST MEDICAL/SURGICAL HISTORY:   HTN (hypertension)  Diabetes mellitus  Obesity  Gastroesophageal reflux disease  Active asthma  Generalized OA  Afib  H/O hernia repair  2011 and revised in 2013  History of cholecystectomy  Status post debridement  H/O tracheostomy  S/P gastrostomy  S/P jejunostomy      ALLERGIES:  No Known Allergies      VITALS:  T(F): 95.9 (05-23 @ 12:00), Max: 96.1 (05-23 @ 08:00)  HR: 99 (05-23 @ 12:00) (80 - 99)  BP: 127/89 (05-23 @ 12:00) (108/61 - 139/63)  RR: 37 (05-23 @ 12:00) (14 - 37)  SpO2: 96% (05-23 @ 12:00) (96% - 100%)    HEIGHT/WEIGHT/BMI:   Height (cm): 167.6 (05-16), 167.6 (04-11), 167.6 (11-20)  Weight (kg): 99.1 (05-22), 82.1 (04-14), 90.2 (11-20)  BMI (kg/m2): 35.3 (05-22), 29.2 (05-16), 29.2 (04-14), 32.1 (04-11)    PHYSICAL EXAM:   GENERAL:    HEENT:    ABDOMEN:    EXTREMITIES:     SKIN:    IV ACCESS: midline  ENTERAL ACCESS: NG [place]    I/Os:     05-22-25 @ 07:01  -  05-23-25 @ 07:00  --------------------------------------------------------  IN:    dextrose 5% + sodium chloride 0.45%: 1200 mL    IV PiggyBack: 50 mL    IV PiggyBack: 100 mL    IV PiggyBack: 100 mL    IV PiggyBack: 499.8 mL    IV PiggyBack: 100 mL  Total IN: 2049.8 mL    OUT:    Bulb (mL): 355 mL    Nasogastric/Oral tube (mL): 0 mL    Ureteral Catheter (mL): 1120 mL  Total OUT: 1475 mL    Total NET: 574.8 mL    STANDING MEDICATIONS:   acetaminophen   IVPB .. 1000 milliGRAM(s) IV Intermittent once  albuterol    90 MICROgram(s) HFA Inhaler 2 Puff(s) Inhalation every 6 hours  chlorhexidine 2% Cloths 1 Application(s) Topical <User Schedule>  dextrose 5% + sodium chloride 0.45%. 1000 milliLiter(s) IV Continuous <Continuous>  enoxaparin Injectable 40 milliGRAM(s) SubCutaneous every 24 hours  fluconAZOLE IVPB 200 milliGRAM(s) IV Intermittent every 24 hours  HYDROmorphone  Injectable 0.25 milliGRAM(s) IV Push every 6 hours PRN  ipratropium 17 MICROgram(s) HFA Inhaler 1 Puff(s) Inhalation every 6 hours  melatonin 5 milliGRAM(s) Oral at bedtime  meropenem  IVPB 1000 milliGRAM(s) IV Intermittent every 8 hours  nystatin Powder 1 Application(s) Topical two times a day  pantoprazole  Injectable 40 milliGRAM(s) IV Push every 12 hours  Parenteral Nutrition - Adult 1 Each TPN Continuous <Continuous>  sodium chloride 0.9% lock flush 10 milliLiter(s) IV Push every 1 hour PRN  sodium chloride 3%  Inhalation 4 milliLiter(s) Inhalation every 12 hours      LABS:                         8.9    10.20 )-----------( 236      ( 22 May 2025 23:05 )             27.9     143  |  111[H]  |  13  ----------------------------<  91          (05-23-25 @ 10:40)  4.1   |  26  |  0.7    Ca    8.8          (05-23-25 @ 10:40)  Phos  2.5         (05-22-25 @ 23:05)  Mg     1.9         (05-22-25 @ 23:05)    TPro  4.1[L]  /  Alb  2.1[L]  /  TBili  0.4  /  DBili  0.2  /  AST  10  /  ALT  9   /  AlkPhos  91       05-21-25 @ 23:28    Vitamin D, 25-Hydroxy: 30 ng/mL (04-10 @ 10:32)  A1c: 5.1 % (04-11-25 @ 06:04)    Blood Glucose (Past 24 hours):  85 mg/dL (05-23 @ 12:25)  93 mg/dL (05-23 @ 05:21)  86 mg/dL (05-22 @ 22:57)  87 mg/dL (05-22 @ 17:19)      DIET:   Diet, NPO (05-20-25 @ 23:37) [Active]    Parenteral Nutrition - Adult 1 Each (58 mL/Hr) TPN Continuous <Continuous>, 05-23-25 @ 20:00, 20:00, Stop order after: 1 Days      ASSESSMENT  81F PMHx HTN, a-fib (on xarelto), solitary kidney, DM, GERD, prior open cholecystectomy c/b suture granulomas s/p 2019 abdominal wall debridements by Dr. Banda, respiratory distress with prolonged ventilation during 11/2024 admission, s/p 11/19 tracheostomy and gastrostomy and feeding jejunostomy tube by Dr. Champion, ya catheter dependence who presented to the ED from NH on 5/13 with 4 days progressively worsening abdominal pain. Found to have perforated peptic ulcer. s/p exploratory laparotomy antrectomy and D1 stapled off with temporary abdominal closure and abthera in place with RTOR 5/16 s/p Billroth II 40cm from LOT with gastrojejunostomy tube placed with jejunal feeding tube (distal tip in jejunum, no gastric access), RLQ Gregorio drain by anastomosis extending to duodenal stump.     - perforated prepyloric gastric ulcer, 5/16 s/p exploratory laparotomy antrectomy and D1 stapled off with temporary abdominal closure and abthera in place  - RTOR 5/16 s/p Billroth II 40cm from LOT with gastrojejunostomy tube placed with jejunal feeding tube (distal tip in jejunum, no gastric access), RLQ Gregorio drain by anastomosis extending to duodenal stump  - suspected bile leak, bilious output BULMARO drain, bilirubin 9.7   - jejunal tube in stomach, intolerance of gastric feeds  - prolonged NPO/inadequate nutrient intake (700mL total volume) X 11d- PN initiated 5/23    Est nutrient needs: ASPEN/SCCM obesity critical pt guidelines: [phone] kcals (11-14kcals/kg-Actual/bed Wt 5/20 89kg), 118g protein (2g/kg IBW of 59kg), fluid needs reassessed daily      PLAN  - central line placement, initiate parenteral nutrition tonight- start with 100g AA, 175g dextrose @58ml/hr, lipids to start 5/24 pending TG level   - d/c IVF's when PN starts tonight  - monitor POC gluc 1 hr after TPN starts then q6hrs (no insulin in solution)  - bmp, phos, mg, TG in am 5/24   - sterile central line care per protocol  - will follow    d/w SICU, surgical team, Dr. Pedersen, GI team, Dr. Borrero GI NUTRITION SUPPORT TEAM  -  CONSULT NOTE     Pt is 81F PMHx HTN, a-fib (on xarelto), solitary kidney, DM, GERD, prior open cholecystectomy c/b suture granulomas s/p 2019 abdominal wall debridements by Dr. Banda, respiratory distress with prolonged ventilation during 11/2024 admission, s/p 11/19 tracheostomy and gastrostomy and feeding jejunostomy tube by Dr. Champion, ya catheter dependence who presented to the ED from NH on 5/13 with 4 days progressively worsening abdominal pain. Surgery consulted for imaging concerning for duodenal perforation. History limited by pt's minimally-verbal status. Spoke with daughter on phone, pt is full code at this time, (13 May 2025 21:02)      GI NUTRITION SUPPORT NOTE:    Presented with abdominal pain, imaging suggestive of duodenal perforation, taken to OR 5/14 and found prepyloric ulcer 4cm perforation anterior stomach. s/p antrectomy with D1 stapled off, temporary abdominal closure with Abthera placement due to HD instability. RTOR 5/16 s/p laparotomy, Billroth II 40cm from LOT with gastrojejunostomy tube placed with jejunal feeding tube (distal tip in jejunum, no gastric access), RLQ Gregorio drain by anastomosis extending to duodenal stump. Trickle feeds started via J-tube on 5/17 and on 5/18 found to have bilious output via BULMARO with bilirubin 9.7, kept strict NPO. 5/20 restarted trickle feeds with resultant high output via NGT, kept NGT to suction and feeds held. Jejunal tube found to have coiled back into stomach, planned replacement with GJ tube in ~1 wk. GI Nutrition consulted to start TPN.       REVIEW OF SYSTEMS:  Negative except as noted above.       PAST MEDICAL/SURGICAL HISTORY:   HTN (hypertension)  Diabetes mellitus  Obesity  Gastroesophageal reflux disease  Active asthma  Generalized OA  Afib  H/O hernia repair  2011 and revised in 2013  History of cholecystectomy  Status post debridement  H/O tracheostomy  S/P gastrostomy  S/P jejunostomy      ALLERGIES:  No Known Allergies      VITALS:  T(F): 95.9 (05-23 @ 12:00), Max: 96.1 (05-23 @ 08:00)  HR: 99 (05-23 @ 12:00) (80 - 99)  BP: 127/89 (05-23 @ 12:00) (108/61 - 139/63)  RR: 37 (05-23 @ 12:00) (14 - 37)  SpO2: 96% (05-23 @ 12:00) (96% - 100%)    HEIGHT/WEIGHT/BMI:   Height (cm): 167.6 (05-16), 167.6 (04-11), 167.6 (11-20)  Weight (kg): 99.1 (05-22), 82.1 (04-14), 90.2 (11-20)  BMI (kg/m2): 35.3 (05-22), 29.2 (05-16), 29.2 (04-14), 32.1 (04-11)    PHYSICAL EXAM:   GENERAL: Awake, alert, nonverbal. Ill appearing  HEENT: +trach  ABDOMEN: refused  EXTREMITIES: +edema  SKIN: pallor, cool, dry  IV ACCESS: midline cath   ENTERAL ACCESS: NG [place]    I/Os:     05-22-25 @ 07:01  -  05-23-25 @ 07:00  --------------------------------------------------------  IN:    dextrose 5% + sodium chloride 0.45%: 1200 mL    IV PiggyBack: 50 mL    IV PiggyBack: 100 mL    IV PiggyBack: 100 mL    IV PiggyBack: 499.8 mL    IV PiggyBack: 100 mL  Total IN: 2049.8 mL    OUT:    Bulb (mL): 355 mL    Nasogastric/Oral tube (mL): 0 mL    Ureteral Catheter (mL): 1120 mL  Total OUT: 1475 mL    Total NET: 574.8 mL    STANDING MEDICATIONS:   acetaminophen   IVPB .. 1000 milliGRAM(s) IV Intermittent once  albuterol    90 MICROgram(s) HFA Inhaler 2 Puff(s) Inhalation every 6 hours  chlorhexidine 2% Cloths 1 Application(s) Topical <User Schedule>  dextrose 5% + sodium chloride 0.45%. 1000 milliLiter(s) IV Continuous <Continuous>  enoxaparin Injectable 40 milliGRAM(s) SubCutaneous every 24 hours  fluconAZOLE IVPB 200 milliGRAM(s) IV Intermittent every 24 hours  HYDROmorphone  Injectable 0.25 milliGRAM(s) IV Push every 6 hours PRN  ipratropium 17 MICROgram(s) HFA Inhaler 1 Puff(s) Inhalation every 6 hours  melatonin 5 milliGRAM(s) Oral at bedtime  meropenem  IVPB 1000 milliGRAM(s) IV Intermittent every 8 hours  nystatin Powder 1 Application(s) Topical two times a day  pantoprazole  Injectable 40 milliGRAM(s) IV Push every 12 hours  Parenteral Nutrition - Adult 1 Each TPN Continuous <Continuous>  sodium chloride 0.9% lock flush 10 milliLiter(s) IV Push every 1 hour PRN  sodium chloride 3%  Inhalation 4 milliLiter(s) Inhalation every 12 hours      LABS:                         8.9    10.20 )-----------( 236      ( 22 May 2025 23:05 )             27.9     143  |  111[H]  |  13  ----------------------------<  91          (05-23-25 @ 10:40)  4.1   |  26  |  0.7    Ca    8.8          (05-23-25 @ 10:40)  Phos  2.5         (05-22-25 @ 23:05)  Mg     1.9         (05-22-25 @ 23:05)    TPro  4.1[L]  /  Alb  2.1[L]  /  TBili  0.4  /  DBili  0.2  /  AST  10  /  ALT  9   /  AlkPhos  91       05-21-25 @ 23:28    Vitamin D, 25-Hydroxy: 30 ng/mL (04-10 @ 10:32)  A1c: 5.1 % (04-11-25 @ 06:04)    Blood Glucose (Past 24 hours):  85 mg/dL (05-23 @ 12:25)  93 mg/dL (05-23 @ 05:21)  86 mg/dL (05-22 @ 22:57)  87 mg/dL (05-22 @ 17:19)      DIET:   Diet, NPO (05-20-25 @ 23:37) [Active]    Parenteral Nutrition - Adult 1 Each (58 mL/Hr) TPN Continuous <Continuous>, 05-23-25 @ 20:00, 20:00, Stop order after: 1 Days      ASSESSMENT  81F PMHx HTN, a-fib (on xarelto), solitary kidney, DM, GERD, prior open cholecystectomy c/b suture granulomas s/p 2019 abdominal wall debridements by Dr. Banda, respiratory distress with prolonged ventilation during 11/2024 admission, s/p 11/19 tracheostomy and gastrostomy and feeding jejunostomy tube by Dr. Champion, ya catheter dependence who presented to the ED from NH on 5/13 with 4 days progressively worsening abdominal pain. Found to have perforated peptic ulcer. s/p exploratory laparotomy antrectomy and D1 stapled off with temporary abdominal closure and abthera in place with RTOR 5/16 s/p Billroth II 40cm from LOT with gastrojejunostomy tube placed with jejunal feeding tube (distal tip in jejunum, no gastric access), RLQ Gregorio drain by anastomosis extending to duodenal stump.     - perforated prepyloric gastric ulcer, 5/16 s/p exploratory laparotomy antrectomy and D1 stapled off with temporary abdominal closure and abthera in place  - RTOR 5/16 s/p Billroth II 40cm from LOT with gastrojejunostomy tube placed with jejunal feeding tube (distal tip in jejunum, no gastric access), RLQ Gregorio drain by anastomosis extending to duodenal stump  - suspected bile leak, bilious output BULMARO drain, bilirubin 9.7   - jejunal tube in stomach, intolerance of gastric feeds  - prolonged NPO/inadequate nutrient intake (700mL total volume) X 11d- PN initiated 5/23    Est nutrient needs: ASPEN/SCCM obesity critical pt guidelines: [phone] kcals (11-14kcals/kg-Actual/bed Wt 5/20 89kg), 118g protein (2g/kg IBW of 59kg), fluid needs reassessed daily      PLAN  - central line placement, initiate parenteral nutrition tonight- start with 100g AA, 175g dextrose @58ml/hr, lipids to start 5/24 pending TG level   - d/c IVF's when PN starts tonight  - monitor POC gluc 1 hr after TPN starts then q6hrs (no insulin in solution)  - bmp, phos, mg, TG in am 5/24   - sterile central line care per protocol  - will follow    d/w SICU, surgical team, Dr. Pedersen, GI team, Dr. Borrero

## 2025-05-23 NOTE — PROCEDURE NOTE - NSINDICATIONS_GEN_A_CORE
Coude catheter exchange
Total Parenteral Nutrition/TPN/venous access
critical patient/monitoring purposes
critical illness/venous access/volume resuscitation

## 2025-05-23 NOTE — PROGRESS NOTE ADULT - SUBJECTIVE AND OBJECTIVE BOX
PATRICIA SPRAGUE  74y, Female  Allergy: No Known Allergies      LOS  10d    CHIEF COMPLAINT: Septic (16 May 2025 12:06)      INTERVAL EVENTS/HPI  - No acute events overnight, awake and alert, refusing abdominal exam   - T(F): , Max: 97 (05-22-25 @ 20:00)  - Tolerating medication  - WBC Count: 10.20 (05-22-25 @ 23:05)  WBC Count: 9.76 (05-21-25 @ 23:28)     - Creatinine: 0.7 (05-23-25 @ 10:40)  Creatinine: 0.7 (05-22-25 @ 23:05)       ROS  General: Denies rigors, nightsweats  HEENT: Denies headache, rhinorrhea, sore throat, eye pain  CV: Denies CP, palpitations  PULM: Denies wheezing, hemoptysis  GI: Denies hematemesis, hematochezia, melena  : Denies discharge, hematuria  MSK: Denies arthralgias, myalgias  SKIN: Denies rash, lesions  NEURO: Denies paresthesias, weakness  PSYCH: Denies depression, anxiety     VITALS:  T(F): 95.9, Max: 97 (05-22-25 @ 20:00)  HR: 99  BP: 127/89  RR: 37Vital Signs Last 24 Hrs  T(C): 35.5 (23 May 2025 12:00), Max: 36.1 (22 May 2025 16:00)  T(F): 95.9 (23 May 2025 12:00), Max: 97 (22 May 2025 20:00)  HR: 99 (23 May 2025 12:00) (80 - 109)  BP: 127/89 (23 May 2025 12:00) (108/61 - 139/63)  BP(mean): 104 (23 May 2025 12:00) (78 - 104)  RR: 37 (23 May 2025 12:00) (11 - 37)  SpO2: 96% (23 May 2025 12:00) (95% - 100%)    Parameters below as of 23 May 2025 12:00  Patient On (Oxygen Delivery Method): T-piece    O2 Concentration (%): 40    PHYSICAL EXAM:  Gen: trach/ vent, chronically ill appearing  HEENT: NCAT  CV: RRR  Lungs: Decreased BS at bases  Abd: refused  Neuro: awake and alert  Skin: no rash   Extremities: warm  Lines: clean, no phlebitis     FH: Non-contributory  Social Hx: Non-contributory    TESTS & MEASUREMENTS:                        8.9    10.20 )-----------( 236      ( 22 May 2025 23:05 )             27.9     05-23    143  |  111[H]  |  13  ----------------------------<  91  4.1   |  26  |  0.7    Ca    8.8      23 May 2025 10:40  Phos  2.5     05-22  Mg     1.9     05-22    TPro  4.1[L]  /  Alb  2.1[L]  /  TBili  0.4  /  DBili  0.2  /  AST  10  /  ALT  9   /  AlkPhos  91  05-21      LIVER FUNCTIONS - ( 21 May 2025 23:28 )  Alb: 2.1 g/dL / Pro: 4.1 g/dL / ALK PHOS: 91 U/L / ALT: 9 U/L / AST: 10 U/L / GGT: x           Urinalysis Basic - ( 23 May 2025 10:40 )    Color: x / Appearance: x / SG: x / pH: x  Gluc: 91 mg/dL / Ketone: x  / Bili: x / Urobili: x   Blood: x / Protein: x / Nitrite: x   Leuk Esterase: x / RBC: x / WBC x   Sq Epi: x / Non Sq Epi: x / Bacteria: x          INFECTIOUS DISEASES TESTING  MRSA PCR Result.: Negative (05-14-25 @ 07:20)  MRSA PCR Result.: Negative (04-14-25 @ 17:16)  Procalcitonin: 5.22 (11-05-24 @ 19:32)  Vancomycin Level, Random: 7.4 (11-05-24 @ 05:15)  MRSA PCR Result.: Negative (11-04-24 @ 13:15)  Vancomycin Level, Random: 8.2 (11-04-24 @ 07:19)      INFLAMMATORY MARKERS  Sedimentation Rate, Erythrocyte: 45 mm/hr (05-14-25 @ 22:15)  C-Reactive Protein: 154.6 mg/L (05-14-25 @ 22:15)      RADIOLOGY & ADDITIONAL TESTS:  I have personally reviewed the last available Chest xray  CXR  Xray Chest 1 View- PORTABLE-Urgent:   ACC: [id number] EXAM:  XR CHEST PORTABLE URGENT 1V   ORDERED BY: JANI BRUCE     PROCEDURE DATE:  05/23/2025          INTERPRETATION:  CLINICAL HISTORY: Nasogastric tube adjustment.    COMPARISON: Chest radiograph dated  May 23, 2025.    TECHNIQUE: Portable frontal chest radiograph. Apices are excluded from   the film.    FINDINGS:    Support devices: Interval replacement of nasogastric tube, in appropriate   position.    Cardiac/mediastinum/hilum: Stable.    Lung parenchyma/Pleura: Unchanged bilateral opacities, left greater the   right. No pneumothorax.    Skeleton/soft tissues: Stable.      IMPRESSION:    1. Interval replacement of nasogastric tube, in appropriate position.  2. Unchanged bilateral opacities, left greater than right.    --- End of Report ---            JET WALDEN MD; Attending Radiologist  This document has been electronically signed. May 23 2025  9:47AM (05-23-25 @ 06:30)      CT      CARDIOLOGY TESTING  recent ekg reviewed    MEDICATIONS  acetaminophen   IVPB .. 1000 IV Intermittent once  albuterol    90 MICROgram(s) HFA Inhaler 2 Inhalation every 6 hours  chlorhexidine 2% Cloths 1 Topical <User Schedule>  dextrose 5% + sodium chloride 0.45%. 1000 IV Continuous <Continuous>  enoxaparin Injectable 40 SubCutaneous every 24 hours  fluconAZOLE IVPB 200 IV Intermittent every 24 hours  ipratropium 17 MICROgram(s) HFA Inhaler 1 Inhalation every 6 hours  melatonin 5 Oral at bedtime  meropenem  IVPB 1000 IV Intermittent every 8 hours  nystatin Powder 1 Topical two times a day  pantoprazole  Injectable 40 IV Push every 12 hours  Parenteral Nutrition - Adult 1 TPN Continuous <Continuous>  sodium chloride 3%  Inhalation 4 Inhalation every 12 hours      WEIGHT  Weight (kg): 99.1 (05-22-25 @ 06:00)  Creatinine: 0.7 mg/dL (05-23-25 @ 10:40)  Creatinine: 0.7 mg/dL (05-22-25 @ 23:05)      ANTIBIOTICS:  fluconAZOLE IVPB 200 milliGRAM(s) IV Intermittent every 24 hours  meropenem  IVPB 1000 milliGRAM(s) IV Intermittent every 8 hours      All available historical records have been reviewed

## 2025-05-24 LAB
ANION GAP SERPL CALC-SCNC: 10 MMOL/L — SIGNIFICANT CHANGE UP (ref 7–14)
BASOPHILS # BLD AUTO: 0.01 K/UL — SIGNIFICANT CHANGE UP (ref 0–0.2)
BASOPHILS NFR BLD AUTO: 0.1 % — SIGNIFICANT CHANGE UP (ref 0–1)
BILIRUB FLD-MCNC: 0.3 MG/DL — SIGNIFICANT CHANGE UP
BUN SERPL-MCNC: 18 MG/DL — SIGNIFICANT CHANGE UP (ref 10–20)
CALCIUM SERPL-MCNC: 9.1 MG/DL — SIGNIFICANT CHANGE UP (ref 8.4–10.5)
CHLORIDE SERPL-SCNC: 110 MMOL/L — SIGNIFICANT CHANGE UP (ref 98–110)
CO2 SERPL-SCNC: 23 MMOL/L — SIGNIFICANT CHANGE UP (ref 17–32)
CREAT SERPL-MCNC: 0.6 MG/DL — LOW (ref 0.7–1.5)
EGFR: 95 ML/MIN/1.73M2 — SIGNIFICANT CHANGE UP
EGFR: 95 ML/MIN/1.73M2 — SIGNIFICANT CHANGE UP
EOSINOPHIL # BLD AUTO: 0.22 K/UL — SIGNIFICANT CHANGE UP (ref 0–0.7)
EOSINOPHIL NFR BLD AUTO: 2.2 % — SIGNIFICANT CHANGE UP (ref 0–8)
GLUCOSE SERPL-MCNC: 139 MG/DL — HIGH (ref 70–99)
HCT VFR BLD CALC: 27.1 % — LOW (ref 37–47)
HGB BLD-MCNC: 8.7 G/DL — LOW (ref 12–16)
IMM GRANULOCYTES NFR BLD AUTO: 1 % — HIGH (ref 0.1–0.3)
LYMPHOCYTES # BLD AUTO: 1.11 K/UL — LOW (ref 1.2–3.4)
LYMPHOCYTES # BLD AUTO: 11.2 % — LOW (ref 20.5–51.1)
MAGNESIUM SERPL-MCNC: 1.9 MG/DL — SIGNIFICANT CHANGE UP (ref 1.8–2.4)
MCHC RBC-ENTMCNC: 27.7 PG — SIGNIFICANT CHANGE UP (ref 27–31)
MCHC RBC-ENTMCNC: 32.1 G/DL — SIGNIFICANT CHANGE UP (ref 32–37)
MCV RBC AUTO: 86.3 FL — SIGNIFICANT CHANGE UP (ref 81–99)
MONOCYTES # BLD AUTO: 0.63 K/UL — HIGH (ref 0.1–0.6)
MONOCYTES NFR BLD AUTO: 6.4 % — SIGNIFICANT CHANGE UP (ref 1.7–9.3)
NEUTROPHILS # BLD AUTO: 7.8 K/UL — HIGH (ref 1.4–6.5)
NEUTROPHILS NFR BLD AUTO: 79.1 % — HIGH (ref 42.2–75.2)
NRBC BLD AUTO-RTO: 0 /100 WBCS — SIGNIFICANT CHANGE UP (ref 0–0)
PHOSPHATE SERPL-MCNC: 2.7 MG/DL — SIGNIFICANT CHANGE UP (ref 2.1–4.9)
PLATELET # BLD AUTO: 247 K/UL — SIGNIFICANT CHANGE UP (ref 130–400)
PMV BLD: 10.8 FL — HIGH (ref 7.4–10.4)
POTASSIUM SERPL-MCNC: 4.2 MMOL/L — SIGNIFICANT CHANGE UP (ref 3.5–5)
POTASSIUM SERPL-SCNC: 4.2 MMOL/L — SIGNIFICANT CHANGE UP (ref 3.5–5)
RBC # BLD: 3.14 M/UL — LOW (ref 4.2–5.4)
RBC # FLD: 16.6 % — HIGH (ref 11.5–14.5)
SODIUM SERPL-SCNC: 143 MMOL/L — SIGNIFICANT CHANGE UP (ref 135–146)
TRIGL SERPL-MCNC: 111 MG/DL — SIGNIFICANT CHANGE UP
WBC # BLD: 9.87 K/UL — SIGNIFICANT CHANGE UP (ref 4.8–10.8)
WBC # FLD AUTO: 9.87 K/UL — SIGNIFICANT CHANGE UP (ref 4.8–10.8)

## 2025-05-24 PROCEDURE — 71045 X-RAY EXAM CHEST 1 VIEW: CPT | Mod: 26

## 2025-05-24 PROCEDURE — 99291 CRITICAL CARE FIRST HOUR: CPT | Mod: 24,25

## 2025-05-24 RX ORDER — MEROPENEM 1 G/30ML
1000 INJECTION INTRAVENOUS EVERY 8 HOURS
Refills: 0 | Status: DISCONTINUED | OUTPATIENT
Start: 2025-05-24 | End: 2025-05-27

## 2025-05-24 RX ORDER — SODIUM/POT/MAG/CALC/CHLOR/ACET 35-20-5MEQ
1 VIAL (ML) INTRAVENOUS
Refills: 0 | Status: DISCONTINUED | OUTPATIENT
Start: 2025-05-24 | End: 2025-05-25

## 2025-05-24 RX ORDER — FUROSEMIDE 10 MG/ML
20 INJECTION INTRAMUSCULAR; INTRAVENOUS ONCE
Refills: 0 | Status: COMPLETED | OUTPATIENT
Start: 2025-05-24 | End: 2025-05-24

## 2025-05-24 RX ORDER — I.V. FAT EMULSION 20 G/100ML
0.5 EMULSION INTRAVENOUS
Qty: 50 | Refills: 0 | Status: DISCONTINUED | OUTPATIENT
Start: 2025-05-24 | End: 2025-05-24

## 2025-05-24 RX ADMIN — Medication 2 PUFF(S): at 02:43

## 2025-05-24 RX ADMIN — Medication 2 PUFF(S): at 15:10

## 2025-05-24 RX ADMIN — Medication 40 MILLIGRAM(S): at 05:58

## 2025-05-24 RX ADMIN — ENOXAPARIN SODIUM 40 MILLIGRAM(S): 100 INJECTION SUBCUTANEOUS at 11:17

## 2025-05-24 RX ADMIN — Medication 1 PUFF(S): at 02:44

## 2025-05-24 RX ADMIN — Medication 58 EACH: at 21:15

## 2025-05-24 RX ADMIN — Medication 1 PUFF(S): at 07:25

## 2025-05-24 RX ADMIN — Medication 0.25 MILLIGRAM(S): at 21:12

## 2025-05-24 RX ADMIN — FUROSEMIDE 20 MILLIGRAM(S): 10 INJECTION INTRAMUSCULAR; INTRAVENOUS at 17:46

## 2025-05-24 RX ADMIN — MEROPENEM 100 MILLIGRAM(S): 1 INJECTION INTRAVENOUS at 21:15

## 2025-05-24 RX ADMIN — Medication 1 APPLICATION(S): at 05:58

## 2025-05-24 RX ADMIN — MEROPENEM 100 MILLIGRAM(S): 1 INJECTION INTRAVENOUS at 05:58

## 2025-05-24 RX ADMIN — Medication 1 PUFF(S): at 19:59

## 2025-05-24 RX ADMIN — Medication 2 PUFF(S): at 07:25

## 2025-05-24 RX ADMIN — Medication 2 PUFF(S): at 19:59

## 2025-05-24 RX ADMIN — Medication 100 MILLIGRAM(S): at 01:21

## 2025-05-24 RX ADMIN — NYSTATIN 1 APPLICATION(S): 100000 CREAM TOPICAL at 05:58

## 2025-05-24 RX ADMIN — NYSTATIN 1 APPLICATION(S): 100000 CREAM TOPICAL at 17:23

## 2025-05-24 RX ADMIN — Medication 4 MILLILITER(S): at 07:22

## 2025-05-24 RX ADMIN — Medication 0.25 MILLIGRAM(S): at 20:55

## 2025-05-24 RX ADMIN — I.V. FAT EMULSION 20.8 GM/KG/DAY: 20 EMULSION INTRAVENOUS at 21:16

## 2025-05-24 RX ADMIN — Medication 40 MILLIGRAM(S): at 11:20

## 2025-05-24 RX ADMIN — Medication 1 PUFF(S): at 15:10

## 2025-05-24 NOTE — PROGRESS NOTE ADULT - SUBJECTIVE AND OBJECTIVE BOX
PATRICIA SPRAGUE   [id number]   [birth year]    74yF  ============================================================   DATE OF INITIAL SICU/SDU CONSULT: 05-13-25    INDICATION FOR SICU CONSULT:  perforated duodenal ulcer      SICU COURSE EVENTS :  05-13 - admitted to SICU service  05-14 - s/p OR for exploratory laparotomy, started remdesivir for covid +, echo ordered  5/15 - Echo showing EF 30-35%, cardiology consulted. Pending RTOR. Remains on vasopressors, weaning.  5/16: S/p Exploratory laparotomy, bilroth 2, J tube placement. Episode of wheezing. Changed to meropenem per ID. AFIB slow ventricular response HR 40s-60s, remains on low dose pressors   5/17: Tolerating T piece, trickle feed started via J tube, s/p 1u PRBC to wean off levophed, remains on vasopressin, IVL  5/18: Midodrine 10mg q8 started, weaned off levophed. BULMARO drain bilious output > strict NPO, f/u fluid composition   5/19: CTAP - No evidence of drainable fluid collection.   5/20: Trickle feeds restarted but discontinued 2/2 high gastric residual, NGT kep to suction. Caspofungin discontinued, narrowed to diclucan.  5/21: GI consulted for GJ replacement  5/23: PICC line placed with IR, started TPN in PM, IVL    24HOUR EVENTS  5/23  Night  - PM rounds: HR 69 Afib, /92, saturating 100% on Tpiece. BULMARO drain serous.   - AM labs   - IVL @ midnight  - LUE PICC placed for TPN     5/23  DAY  -IR for PICC for TPN today  - lasix 20 IV  - f/u BMP 11   - GI holding off for GJ for now  - BULMARO bilirubin pending  -ok for stepdown  - d/c IVF in pm when TPN starts    [X] A ten-point review of systems was negative except as expressed in note.  [X] History was obtained from patient. If unable to participate in their care, history obtained from review of the chart and collateral sources of information.  ============================================================   74F found to have perforated peptic ulcer now s/p ex lap, distal antrectomy, abthera placement requiring vasopressors. RTOR 5/16 s/p second look laparotomy, Billroth II reconstruction, gastrojejunostomy tube replaced with a jejunal feeding tube, abdomen closed at the end of the case.     NEUROLOGICAL:  #Acute pain  - IV APAP while NPO   - Dilaudid 0.25 PRN    RESPIRATORY:   #Respiratory failure, s/p tracheostomy (11/24)    - now with 8 portex cuffed trach (switched in OR on 5/14)    - tolerating T- piece x 24 hrs (5/17)    - continue duonebs   #Decreased small bibasilar opacities/atelectasis, right greater than left on CT  - 5/16: s/p deep suctioning by anesthesia w/ copious secretions   #h/o asthma  - atrovent and albuterol   #Increased secretions  - 3% inhaled NS  # COVID+ on admission  - s/p Remdesivir  - off contact precautions 5/24  #Activity   - increase as tolerated     CARDS:   #Nonsustained Vtach - self resolved   - cards consult: management of septic shock. Start short acting AC for afib. Start beta blockers once off pressors   #hypotension 2/2 sepsis   - off vasopressors since 5/19  - MAP >65  - Holding midodrine 10 mg Q8h while NPO   #hx afib  - transiently was in AFIB slow ventricular response (HR 40s-60s), remains on low dose pressors   - holding home Xarelto 15 QD  #hx HTN  - holding home losartan 100QD  #elevated troponin  - 143 > 84, no longer trending  #TTE 11/24: EF 71%. Mild-mod AS. Trivial pericardial effusion.   - Echo 5/15: EF 30-35%, multiple left ventricular motion wall abnormalities. Possible Takotsubo vs ischemic. Global left ventricular systolic function.     GASTROINTESTINAL/NUTRITION:   #perforated prepyloric gastric ulcer s/p exploratory laparotomy antrectomy and D1 stapled off with temporary abdominal closure and abthera in place  - 5/16: RTOR s/p bilroth 2, J tube placement, RLQ maliha drain by anastomosis extending to duodenal stump  - 5/18: BULMARO drain now w/ bilious output > pt made strict NPO, BULMARO drain bilirubin 9.7, LFTs WNL  #Diet, NPO strict  - 5/23: s/p PICC by IR, started TPN in PM  - J tube study performed 5/21 --> GI consulted for exchange of J for GJ--> as of 5/23 GI is holding off on exchange for now  - aspiration precautions, HOB 30  #GI Prophylaxis    - protonix 40mg bid IV  #Bowel regimen    -holding    -last bowel movement 5/17    /RENAL:   #urine output in critically ill    -chronic indwelling ya > new ya exchanged 5/17  #Maintain euvolemia     started TPN 5/24 PM, d/c IV fluids  #Hypomagnesemia, hypophosphatemia  - 30mmol KPO4, 1g Mg    #metabolic acidosis, resolved  - S/p Lasix 20mg x1 5/21  #NOLA (baseline creatine 0.6), improving   - nephrology consulted > no RRT at this time  #Hx of solitary kidney    Labs:   BUN/Cr- 14/0.7  -->,  13/0.7  -->    [05-23 @ 10:40]Na  143 // K  4.1 // Mg  -- // Phos  --  [05-22 @ 23:05]Na  144 // K  3.7 // Mg  1.9 // Phos  2.5      HEME/ONC:   #DVT prophylaxis    -Lovenox    -SCDs  #anemia  - 1pRBC 5/17 to help wean off vasopressors    Labs: Hb/Hct:  8.5/26.3  (05-21 @ 23:28)  -->,  8.9/27.9  (05-22 @ 23:05)  -->                      Plts:  245  -->,  236  -->                 PTT/INR:          ID:  #perforated duodenal ulcer  - Meropenem (5/16 - )  - Fluconazole 200mg q24  - Nystatin powder for groin rash (5/15 - )  Completed antibiotics:  - zosyn (renal dosing) (5/14 - 5/16)  - caspofungin per primary team (5/14 - 5/20)  #COVID positive on admission --- resolved    -  completed remdesivir 2 day course  - completed isolation precautions 5/23  #Cultures    - 5/13: blood culture: NGTD    - 5/13: blood culture: NGTD    - 5/13: urine culture: E Coli  - ID following  WBC- 9.76  --->>,  10.20  --->>  Temp trend- 24hrs T(F): 96.9 (05-23 @ 20:00), Max: 97.8 (05-23 @ 16:23)  Current antibiotics-fluconAZOLE IVPB 200 every 24 hours  meropenem  IVPB 1000 every 8 hours      ENDOCRINE:  #glycemic monitoring    -D51/2 NS for persistent hypoglycemia     -FSG q6 while NPO    -Glucose goal 140-180. If above 180, will start corrective insulin sliding scale  #hypercalcemia  -  home cinacalcet 30mg bid, must be crushed well to not clog NGT - holding while NPO     MSK:  #Activity - increase as tolerated   #hx osteoporosis  - holding home Alendronate 70mg weekly on Thursdays while NPO    SKIN:  #DTI screening negative 5/23    - will continue to monitor daily skin changes      PALLIATIVE:  Currently full code, palliative consult       LINES/DRAINS:  PIV, Midline Catheter x2 (5/20-) Ya (replaced 5/17), J tube (5/16), 8 cuffed portex trach,     Discontinued lines: R radial arterial line (5/14- 5/22), LIJ CVC (5/14- 5/20)  ADVANCED DIRECTIVES:  Full Code    HCP/Emergency Contact- daughter Cecilia [phone]  INDICATION FOR SICU/SDU:  perforated peptic ulcer; mechanical ventilation/vasopressors     DISPO:  SICU. Case discussed with attending Dr. Pedersen   PATRICIA SPRAGUE   [id number]   [birth year]    74yF  ============================================================   DATE OF INITIAL SICU/SDU CONSULT: 05-13-25    INDICATION FOR SICU CONSULT:  perforated duodenal ulcer      SICU COURSE EVENTS :  05-13 - admitted to SICU service  05-14 - s/p OR for exploratory laparotomy, started remdesivir for covid +, echo ordered  5/15 - Echo showing EF 30-35%, cardiology consulted. Pending RTOR. Remains on vasopressors, weaning.  5/16: S/p Exploratory laparotomy, bilroth 2, J tube placement. Episode of wheezing. Changed to meropenem per ID. AFIB slow ventricular response HR 40s-60s, remains on low dose pressors   5/17: Tolerating T piece, trickle feed started via J tube, s/p 1u PRBC to wean off levophed, remains on vasopressin, IVL  5/18: Midodrine 10mg q8 started, weaned off levophed. BULMARO drain bilious output > strict NPO, f/u fluid composition   5/19: CTAP - No evidence of drainable fluid collection.   5/20: Trickle feeds restarted but discontinued 2/2 high gastric residual, NGT kep to suction. Caspofungin discontinued, narrowed to diclucan.  5/21: GI consulted for GJ replacement  5/23: PICC line placed with IR, started TPN in PM, IVL    24HOUR EVENTS  5/23  Night  - PM rounds: HR 69 Afib, /92, saturating 100% on Tpiece. BULMARO drain serous.   - AM labs   - IVL @ midnight  - LUE PICC placed for TPN     5/23  DAY  -IR for PICC for TPN today  - lasix 20 IV  - f/u BMP 11   - GI holding off for GJ for now  - BULMARO bilirubin pending  -ok for stepdown  - d/c IVF in pm when TPN starts    [X] A ten-point review of systems was negative except as expressed in note.  [X] History was obtained from patient. If unable to participate in their care, history obtained from review of the chart and collateral sources of information.  ============================================================   Daily     Daily     Diet, NPO (05-20-25 @ 23:37)      CURRENT MEDS:  Neurologic Medications  acetaminophen   IVPB .. 1000 milliGRAM(s) IV Intermittent once  HYDROmorphone  Injectable 0.25 milliGRAM(s) IV Push every 6 hours PRN Severe Pain (7 - 10)  melatonin 5 milliGRAM(s) Oral at bedtime    Respiratory Medications  albuterol    90 MICROgram(s) HFA Inhaler 2 Puff(s) Inhalation every 6 hours  ipratropium 17 MICROgram(s) HFA Inhaler 1 Puff(s) Inhalation every 6 hours  sodium chloride 3%  Inhalation 4 milliLiter(s) Inhalation every 12 hours    Cardiovascular Medications    Gastrointestinal Medications  lipid, fat emulsion (Fish Oil and Plant Based) 20% Infusion 0.5045 Gm/kG/Day IV Continuous <Continuous>  Parenteral Nutrition - Adult 1 Each TPN Continuous <Continuous>  Parenteral Nutrition - Adult 1 Each TPN Continuous <Continuous>  sodium chloride 0.9% lock flush 10 milliLiter(s) IV Push every 1 hour PRN Pre/post blood products, medications, blood draw, and to maintain line patency    Genitourinary Medications    Hematologic/Oncologic Medications  enoxaparin Injectable 40 milliGRAM(s) SubCutaneous every 24 hours    Antimicrobial/Immunologic Medications  meropenem  IVPB 1000 milliGRAM(s) IV Intermittent every 8 hours    Endocrine/Metabolic Medications    Topical/Other Medications  chlorhexidine 2% Cloths 1 Application(s) Topical <User Schedule>  nystatin Powder 1 Application(s) Topical two times a day      ICU Vital Signs Last 24 Hrs  T(C): 36.4 (24 May 2025 20:00), Max: 36.8 (24 May 2025 08:00)  T(F): 97.6 (24 May 2025 20:00), Max: 98.2 (24 May 2025 08:00)  HR: 95 (24 May 2025 20:00) (67 - 95)  BP: 107/65 (24 May 2025 20:00) (94/53 - 118/62)  BP(mean): 82 (24 May 2025 20:00) (70 - 85)  ABP: --  ABP(mean): --  RR: 20 (24 May 2025 20:00) (18 - 31)  SpO2: 95% (24 May 2025 20:00) (91% - 100%)    O2 Parameters below as of 24 May 2025 20:00  Patient On (Oxygen Delivery Method): T-piece  O2 Flow (L/min): 7  O2 Concentration (%): 40              I&O's Summary    23 May 2025 07:01  -  24 May 2025 07:00  --------------------------------------------------------  IN: 1338 mL / OUT: 3200 mL / NET: -1862 mL    24 May 2025 07:01  -  24 May 2025 20:16  --------------------------------------------------------  IN: 638 mL / OUT: 1050 mL / NET: -412 mL      I&O's Detail    23 May 2025 07:01  -  24 May 2025 07:00  --------------------------------------------------------  IN:    dextrose 5% + sodium chloride 0.45%: 250 mL    dextrose 5% + sodium chloride 0.45%: 300 mL    IV PiggyBack: 50 mL    IV PiggyBack: 100 mL    TPN (Total Parenteral Nutrition): 638 mL  Total IN: 1338 mL    OUT:    Bulb (mL): 310 mL    Ureteral Catheter (mL): 2890 mL  Total OUT: 3200 mL    Total NET: -1862 mL      24 May 2025 07:01  -  24 May 2025 20:16  --------------------------------------------------------  IN:    TPN (Total Parenteral Nutrition): 638 mL  Total IN: 638 mL    OUT:    Bulb (mL): 50 mL    Indwelling Catheter - Urethral (mL): 1000 mL    Nasogastric/Oral tube (mL): 0 mL  Total OUT: 1050 mL    Total NET: -412 mL          05-23-25 @ 07:01  -  05-24-25 @ 07:00  --------------------------------------------------------  IN: 0 mL/kg/d / OUT: 29.16 mL/kg/d / NET: -29.16 mL/kg/d    05-24-25 @ 07:01  -  05-24-25 @ 20:16  --------------------------------------------------------  IN: 0 mL/kg/d / OUT: 10.09 mL/kg/d / NET: -10.09 mL/kg/d        PHYSICAL EXAM: reflects exam at AM rounds    General: Pt lying comfortably in bed.     Neuro: GCS:  11T   = E 4  / V  1T / M   6   Neuro: Alert & oriented, no focal deficits. CNs II-XII grossly intact. PERRLA, EOMs intact. Spontaneously moving extremities.     Lungs: Mild rhonchi bilaterally, normal expansion/effort. Oxygen delivery: trach collar.      Cardiovascular: S1, S2.  Regular rate and rhythm. Cardiac Rhythm: NSR  Peripheral edema: 1+ bilaterally LEs    GI: Abdomen soft, Non-tender, Non-distended. Gastrostomy tube in place.    Extremities: Extremities warm, pink, well-perfused.     Derm: Good skin turgor, no skin breakdown.     : Burger catheter in place     CXR: FINDINGS/IMPRESSION:    Interval removal of left PICC. NG tube terminating just below the diaphragm with side port at the level of the diaphragm. Recommend advancement. Tracheostomy.    Increased right effusion/opacities. Decreased left basal pleural-parenchymal opacities. No pneumothorax.    Stable cardiomediastinal silhouette. Unchanged osseous structures.      LABS:  CAPILLARY BLOOD GLUCOSE      POCT Blood Glucose.: 170 mg/dL (24 May 2025 18:59)  POCT Blood Glucose.: 131 mg/dL (24 May 2025 06:03)  POCT Blood Glucose.: 136 mg/dL (24 May 2025 01:15)                          8.7    9.87  )-----------( 247      ( 24 May 2025 06:08 )             27.1       05-24    143  |  110  |  18  ----------------------------<  139[H]  4.2   |  23  |  0.6[L]    Ca    9.1      24 May 2025 06:08  Phos  2.7     05-24  Mg     1.9     05-24              Urinalysis Basic - ( 24 May 2025 06:08 )    Color: x / Appearance: x / SG: x / pH: x  Gluc: 139 mg/dL / Ketone: x  / Bili: x / Urobili: x   Blood: x / Protein: x / Nitrite: x   Leuk Esterase: x / RBC: x / WBC x   Sq Epi: x / Non Sq Epi: x / Bacteria: x

## 2025-05-24 NOTE — PROGRESS NOTE ADULT - ASSESSMENT
74F found to have perforated peptic ulcer now s/p ex lap, distal antrectomy, abthera placement requiring vasopressors. RTOR 5/16 s/p second look laparotomy, Billroth II reconstruction, gastrojejunostomy tube replaced with a jejunal feeding tube, abdomen closed at the end of the case.     NEUROLOGICAL:  #Acute pain  - IV APAP while NPO   - Dilaudid 0.25 PRN    RESPIRATORY:   #Respiratory failure, s/p tracheostomy (11/24)    - now with 8 portex cuffed trach (switched in OR on 5/14)    - tolerating T- piece x 24 hrs (5/17)    - continue duonebs   #Decreased small bibasilar opacities/atelectasis, right greater than left on CT  - 5/16: s/p deep suctioning by anesthesia w/ copious secretions   #h/o asthma  - atrovent and albuterol   #Increased secretions  - 3% inhaled NS  # COVID+ on admission  - s/p Remdesivir  - off contact precautions 5/24  #Activity   - increase as tolerated     CARDS:   #Nonsustained Vtach - self resolved   - cards consult: management of septic shock. Start short acting AC for afib. Start beta blockers once off pressors   #hypotension 2/2 sepsis   - off vasopressors since 5/19  - MAP >65  - Holding midodrine 10 mg Q8h while NPO   #hx afib  - transiently was in AFIB slow ventricular response (HR 40s-60s), remains on low dose pressors   - holding home Xarelto 15 QD  #hx HTN  - holding home losartan 100QD  #elevated troponin  - 143 > 84, no longer trending  #TTE 11/24: EF 71%. Mild-mod AS. Trivial pericardial effusion.   - Echo 5/15: EF 30-35%, multiple left ventricular motion wall abnormalities. Possible Takotsubo vs ischemic. Global left ventricular systolic function.     GASTROINTESTINAL/NUTRITION:   #perforated prepyloric gastric ulcer s/p exploratory laparotomy antrectomy and D1 stapled off with temporary abdominal closure and abthera in place  - 5/16: RTOR s/p bilroth 2, J tube placement, RLQ maliha drain by anastomosis extending to duodenal stump  - 5/18: BULMARO drain now w/ bilious output > pt made strict NPO, BULMARO drain bilirubin 9.7, LFTs WNL  #Diet, NPO strict  - 5/23: s/p PICC by IR, started TPN in PM  - J tube study performed 5/21 --> GI consulted for exchange of J for GJ--> as of 5/23 GI is holding off on exchange for now  - aspiration precautions, HOB 30  #GI Prophylaxis    - protonix 40mg bid IV  #Bowel regimen    -holding    -last bowel movement 5/17    /RENAL:   #urine output in critically ill    -chronic indwelling ya > new ya exchanged 5/17  #Maintain euvolemia     started TPN 5/24 PM, d/c IV fluids  #Hypomagnesemia, hypophosphatemia  - 30mmol KPO4, 1g Mg    #metabolic acidosis, resolved  - S/p Lasix 20mg x1 5/21  #NOLA (baseline creatine 0.6), improving   - nephrology consulted > no RRT at this time  #Hx of solitary kidney    Labs:   BUN/Cr- 14/0.7  -->,  13/0.7  -->    [05-23 @ 10:40]Na  143 // K  4.1 // Mg  -- // Phos  --  [05-22 @ 23:05]Na  144 // K  3.7 // Mg  1.9 // Phos  2.5      HEME/ONC:   #DVT prophylaxis    -Lovenox    -SCDs  #anemia  - 1pRBC 5/17 to help wean off vasopressors    Labs: Hb/Hct:  8.5/26.3  (05-21 @ 23:28)  -->,  8.9/27.9  (05-22 @ 23:05)  -->                      Plts:  245  -->,  236  -->                 PTT/INR:          ID:  #perforated duodenal ulcer  - Meropenem (5/16 - )  - Fluconazole 200mg q24  - Nystatin powder for groin rash (5/15 - )  Completed antibiotics:  - zosyn (renal dosing) (5/14 - 5/16)  - caspofungin per primary team (5/14 - 5/20)  #COVID positive on admission --- resolved    -  completed remdesivir 2 day course  - completed isolation precautions 5/23  #Cultures    - 5/13: blood culture: NGTD    - 5/13: blood culture: NGTD    - 5/13: urine culture: E Coli  - ID following  WBC- 9.76  --->>,  10.20  --->>  Temp trend- 24hrs T(F): 96.9 (05-23 @ 20:00), Max: 97.8 (05-23 @ 16:23)  Current antibiotics-fluconAZOLE IVPB 200 every 24 hours  meropenem  IVPB 1000 every 8 hours      ENDOCRINE:  #glycemic monitoring    -D51/2 NS for persistent hypoglycemia     -FSG q6 while NPO    -Glucose goal 140-180. If above 180, will start corrective insulin sliding scale  #hypercalcemia  -  home cinacalcet 30mg bid, must be crushed well to not clog NGT - holding while NPO     MSK:  #Activity - increase as tolerated   #hx osteoporosis  - holding home Alendronate 70mg weekly on Thursdays while NPO    SKIN:  #DTI screening negative 5/23    - will continue to monitor daily skin changes      PALLIATIVE:  Currently full code, palliative consult placed      LINES/DRAINS:  PIV, Midline Catheter x2 (5/20-) Ya (replaced 5/17), J tube (5/16), 8 cuffed portex trach,     Discontinued lines: R radial arterial line (5/14- 5/22), LIJ CVC (5/14- 5/20)  ADVANCED DIRECTIVES:  Full Code    HCP/Emergency Contact- daughter Cecilia [phone]  INDICATION FOR SICU/SDU:  perforated peptic ulcer; mechanical ventilation/vasopressors     DISPO:  SICU. Case to be discussed with attending Dr. Pedersen

## 2025-05-25 LAB
ANION GAP SERPL CALC-SCNC: 7 MMOL/L — SIGNIFICANT CHANGE UP (ref 7–14)
ANION GAP SERPL CALC-SCNC: 8 MMOL/L — SIGNIFICANT CHANGE UP (ref 7–14)
BUN SERPL-MCNC: 32 MG/DL — HIGH (ref 10–20)
BUN SERPL-MCNC: 37 MG/DL — HIGH (ref 10–20)
CALCIUM SERPL-MCNC: 10.5 MG/DL — SIGNIFICANT CHANGE UP (ref 8.4–10.5)
CALCIUM SERPL-MCNC: 9.8 MG/DL — SIGNIFICANT CHANGE UP (ref 8.4–10.5)
CHLORIDE SERPL-SCNC: 111 MMOL/L — HIGH (ref 98–110)
CHLORIDE SERPL-SCNC: 112 MMOL/L — HIGH (ref 98–110)
CO2 SERPL-SCNC: 26 MMOL/L — SIGNIFICANT CHANGE UP (ref 17–32)
CO2 SERPL-SCNC: 27 MMOL/L — SIGNIFICANT CHANGE UP (ref 17–32)
CREAT SERPL-MCNC: 0.7 MG/DL — SIGNIFICANT CHANGE UP (ref 0.7–1.5)
CREAT SERPL-MCNC: 0.8 MG/DL — SIGNIFICANT CHANGE UP (ref 0.7–1.5)
EGFR: 78 ML/MIN/1.73M2 — SIGNIFICANT CHANGE UP
EGFR: 78 ML/MIN/1.73M2 — SIGNIFICANT CHANGE UP
EGFR: 92 ML/MIN/1.73M2 — SIGNIFICANT CHANGE UP
EGFR: 92 ML/MIN/1.73M2 — SIGNIFICANT CHANGE UP
GLUCOSE SERPL-MCNC: 107 MG/DL — HIGH (ref 70–99)
GLUCOSE SERPL-MCNC: 128 MG/DL — HIGH (ref 70–99)
HCT VFR BLD CALC: 23.8 % — LOW (ref 37–47)
HCT VFR BLD CALC: 24.7 % — LOW (ref 37–47)
HGB BLD-MCNC: 7.5 G/DL — LOW (ref 12–16)
HGB BLD-MCNC: 7.7 G/DL — LOW (ref 12–16)
MAGNESIUM SERPL-MCNC: 2 MG/DL — SIGNIFICANT CHANGE UP (ref 1.8–2.4)
MAGNESIUM SERPL-MCNC: 2 MG/DL — SIGNIFICANT CHANGE UP (ref 1.8–2.4)
MCHC RBC-ENTMCNC: 27.6 PG — SIGNIFICANT CHANGE UP (ref 27–31)
MCHC RBC-ENTMCNC: 27.6 PG — SIGNIFICANT CHANGE UP (ref 27–31)
MCHC RBC-ENTMCNC: 31.2 G/DL — LOW (ref 32–37)
MCHC RBC-ENTMCNC: 31.5 G/DL — LOW (ref 32–37)
MCV RBC AUTO: 87.5 FL — SIGNIFICANT CHANGE UP (ref 81–99)
MCV RBC AUTO: 88.5 FL — SIGNIFICANT CHANGE UP (ref 81–99)
NRBC BLD AUTO-RTO: 0 /100 WBCS — SIGNIFICANT CHANGE UP (ref 0–0)
NRBC BLD AUTO-RTO: 0 /100 WBCS — SIGNIFICANT CHANGE UP (ref 0–0)
PHOSPHATE SERPL-MCNC: 2.5 MG/DL — SIGNIFICANT CHANGE UP (ref 2.1–4.9)
PHOSPHATE SERPL-MCNC: 2.7 MG/DL — SIGNIFICANT CHANGE UP (ref 2.1–4.9)
PLATELET # BLD AUTO: 186 K/UL — SIGNIFICANT CHANGE UP (ref 130–400)
PLATELET # BLD AUTO: 219 K/UL — SIGNIFICANT CHANGE UP (ref 130–400)
PMV BLD: 10.4 FL — SIGNIFICANT CHANGE UP (ref 7.4–10.4)
PMV BLD: 10.8 FL — HIGH (ref 7.4–10.4)
POTASSIUM SERPL-MCNC: 3.9 MMOL/L — SIGNIFICANT CHANGE UP (ref 3.5–5)
POTASSIUM SERPL-MCNC: 3.9 MMOL/L — SIGNIFICANT CHANGE UP (ref 3.5–5)
POTASSIUM SERPL-SCNC: 3.9 MMOL/L — SIGNIFICANT CHANGE UP (ref 3.5–5)
POTASSIUM SERPL-SCNC: 3.9 MMOL/L — SIGNIFICANT CHANGE UP (ref 3.5–5)
RBC # BLD: 2.72 M/UL — LOW (ref 4.2–5.4)
RBC # BLD: 2.79 M/UL — LOW (ref 4.2–5.4)
RBC # FLD: 16.5 % — HIGH (ref 11.5–14.5)
RBC # FLD: 16.5 % — HIGH (ref 11.5–14.5)
SODIUM SERPL-SCNC: 145 MMOL/L — SIGNIFICANT CHANGE UP (ref 135–146)
SODIUM SERPL-SCNC: 146 MMOL/L — SIGNIFICANT CHANGE UP (ref 135–146)
WBC # BLD: 11.28 K/UL — HIGH (ref 4.8–10.8)
WBC # BLD: 11.33 K/UL — HIGH (ref 4.8–10.8)
WBC # FLD AUTO: 11.28 K/UL — HIGH (ref 4.8–10.8)
WBC # FLD AUTO: 11.33 K/UL — HIGH (ref 4.8–10.8)

## 2025-05-25 PROCEDURE — 99291 CRITICAL CARE FIRST HOUR: CPT | Mod: 24,25

## 2025-05-25 PROCEDURE — 71045 X-RAY EXAM CHEST 1 VIEW: CPT | Mod: 26

## 2025-05-25 RX ORDER — SODIUM/POT/MAG/CALC/CHLOR/ACET 35-20-5MEQ
1 VIAL (ML) INTRAVENOUS
Refills: 0 | Status: DISCONTINUED | OUTPATIENT
Start: 2025-05-25 | End: 2025-05-25

## 2025-05-25 RX ORDER — FLUCONAZOLE 150 MG
200 TABLET ORAL EVERY 24 HOURS
Refills: 0 | Status: DISCONTINUED | OUTPATIENT
Start: 2025-05-25 | End: 2025-05-28

## 2025-05-25 RX ORDER — ALBUMIN (HUMAN) 12.5 G/50ML
50 INJECTION, SOLUTION INTRAVENOUS ONCE
Refills: 0 | Status: DISCONTINUED | OUTPATIENT
Start: 2025-05-25 | End: 2025-05-25

## 2025-05-25 RX ORDER — ALBUMIN (HUMAN) 12.5 G/50ML
250 INJECTION, SOLUTION INTRAVENOUS ONCE
Refills: 0 | Status: COMPLETED | OUTPATIENT
Start: 2025-05-25 | End: 2025-05-25

## 2025-05-25 RX ORDER — ALBUMIN (HUMAN) 12.5 G/50ML
100 INJECTION, SOLUTION INTRAVENOUS ONCE
Refills: 0 | Status: COMPLETED | OUTPATIENT
Start: 2025-05-25 | End: 2025-05-25

## 2025-05-25 RX ORDER — FUROSEMIDE 10 MG/ML
20 INJECTION INTRAMUSCULAR; INTRAVENOUS ONCE
Refills: 0 | Status: COMPLETED | OUTPATIENT
Start: 2025-05-25 | End: 2025-05-25

## 2025-05-25 RX ADMIN — Medication 2 PUFF(S): at 19:46

## 2025-05-25 RX ADMIN — Medication 1 APPLICATION(S): at 06:06

## 2025-05-25 RX ADMIN — Medication 4 MILLILITER(S): at 19:46

## 2025-05-25 RX ADMIN — Medication 40 MILLIGRAM(S): at 11:44

## 2025-05-25 RX ADMIN — NYSTATIN 1 APPLICATION(S): 100000 CREAM TOPICAL at 18:21

## 2025-05-25 RX ADMIN — NYSTATIN 1 APPLICATION(S): 100000 CREAM TOPICAL at 06:13

## 2025-05-25 RX ADMIN — ENOXAPARIN SODIUM 40 MILLIGRAM(S): 100 INJECTION SUBCUTANEOUS at 11:44

## 2025-05-25 RX ADMIN — Medication 1 PUFF(S): at 08:56

## 2025-05-25 RX ADMIN — Medication 1 PUFF(S): at 13:34

## 2025-05-25 RX ADMIN — Medication 2 PUFF(S): at 13:33

## 2025-05-25 RX ADMIN — ALBUMIN (HUMAN) 50 MILLILITER(S): 12.5 INJECTION, SOLUTION INTRAVENOUS at 08:17

## 2025-05-25 RX ADMIN — MEROPENEM 100 MILLIGRAM(S): 1 INJECTION INTRAVENOUS at 21:25

## 2025-05-25 RX ADMIN — Medication 2 PUFF(S): at 08:56

## 2025-05-25 RX ADMIN — Medication 2 PUFF(S): at 02:17

## 2025-05-25 RX ADMIN — Medication 58 EACH: at 22:17

## 2025-05-25 RX ADMIN — Medication 100 MILLIGRAM(S): at 13:01

## 2025-05-25 RX ADMIN — Medication 4 MILLILITER(S): at 08:56

## 2025-05-25 RX ADMIN — FUROSEMIDE 20 MILLIGRAM(S): 10 INJECTION INTRAMUSCULAR; INTRAVENOUS at 11:44

## 2025-05-25 RX ADMIN — Medication 0.25 MILLIGRAM(S): at 22:17

## 2025-05-25 RX ADMIN — MEROPENEM 100 MILLIGRAM(S): 1 INJECTION INTRAVENOUS at 06:06

## 2025-05-25 RX ADMIN — ALBUMIN (HUMAN) 125 MILLILITER(S): 12.5 INJECTION, SOLUTION INTRAVENOUS at 02:27

## 2025-05-25 RX ADMIN — MEROPENEM 100 MILLIGRAM(S): 1 INJECTION INTRAVENOUS at 14:28

## 2025-05-25 RX ADMIN — Medication 0.25 MILLIGRAM(S): at 23:07

## 2025-05-25 RX ADMIN — Medication 1 PUFF(S): at 02:17

## 2025-05-25 RX ADMIN — Medication 1 PUFF(S): at 19:46

## 2025-05-25 NOTE — PROGRESS NOTE ADULT - SUBJECTIVE AND OBJECTIVE BOX
PATRICIA SPRAGUE   [id number]   [birth year]    74yF  ============================================================   DATE OF INITIAL SICU/SDU CONSULT: 05-13-25    INDICATION FOR SICU CONSULT:  perforated duodenal ulcer      SICU COURSE EVENTS :  05-13 - admitted to SICU service  05-14 - s/p OR for exploratory laparotomy, started remdesivir for covid +, echo ordered  5/15 - Echo showing EF 30-35%, cardiology consulted. Pending RTOR. Remains on vasopressors, weaning.  5/16: S/p Exploratory laparotomy, bilroth 2, J tube placement. Episode of wheezing. Changed to meropenem per ID. AFIB slow ventricular response HR 40s-60s, remains on low dose pressors   5/17: Tolerating T piece, trickle feed started via J tube, s/p 1u PRBC to wean off levophed, remains on vasopressin, IVL  5/18: Midodrine 10mg q8 started, weaned off levophed. BULMARO drain bilious output > strict NPO, f/u fluid composition   5/19: CTAP - No evidence of drainable fluid collection.   5/20: Trickle feeds restarted but discontinued 2/2 high gastric residual, NGT kep to suction. Caspofungin discontinued, narrowed to diclucan.  5/21: GI consulted for GJ replacement  5/23: PICC line placed with IR, started TPN in PM, IVL  5/24: diuresis with lasix    24HOUR EVENTS  5/24  NIGHT  - PM rounds: satting 94% on T piece, SBP 110s, HR 90s,  refusing abdominal exam, BULMARO drain with serous output    Day   - R lung getting worse,  IPV q8   - If desaturation pressure support   - Fllow up on GI for advance G/J tube ( re scope for Tuesday )  - Protonix 40mg once a day instead of bid   - Stop Diflucan, keep Meropenem for a couple of more days. ( No yeast)  - lasix 20mg IV x 1    [X] A ten-point review of systems was negative except as expressed in note.  [X] History was obtained from patient. If unable to participate in their care, history obtained from review of the chart and collateral sources of information.  ============================================================    PATRICIA SPRAGUE   [id number]   [birth year]    74yF  ============================================================   DATE OF INITIAL SICU/SDU CONSULT: 05-13-25    INDICATION FOR SICU CONSULT:  perforated duodenal ulcer      SICU COURSE EVENTS :  05-13 - admitted to SICU service  05-14 - s/p OR for exploratory laparotomy, started remdesivir for covid +, echo ordered  5/15 - Echo showing EF 30-35%, cardiology consulted. Pending RTOR. Remains on vasopressors, weaning.  5/16: S/p Exploratory laparotomy, bilroth 2, J tube placement. Episode of wheezing. Changed to meropenem per ID. AFIB slow ventricular response HR 40s-60s, remains on low dose pressors   5/17: Tolerating T piece, trickle feed started via J tube, s/p 1u PRBC to wean off levophed, remains on vasopressin, IVL  5/18: Midodrine 10mg q8 started, weaned off levophed. BULMARO drain bilious output > strict NPO, f/u fluid composition   5/19: CTAP - No evidence of drainable fluid collection.   5/20: Trickle feeds restarted but discontinued 2/2 high gastric residual, NGT kep to suction. Caspofungin discontinued, narrowed to diclucan.  5/21: GI consulted for GJ replacement  5/23: PICC line placed with IR, started TPN in PM, IVL  5/24: diuresis with lasix. albumin 55 250cc x 1 given overnight.  5/25- 25% 50cc albumin x 1 given for SBP 90s, improved to 120s. Diuresis with 20mg lasix x 1, >1.8L response. IPV increased to Q6 hours.  24HOUR EVENTS    [X] A ten-point review of systems was negative except as expressed in note.  [X] History was obtained from patient. If unable to participate in their care, history obtained from review of the chart and collateral sources of information.  ============================================================   Daily     Daily     Diet, NPO (05-20-25 @ 23:37)      CURRENT MEDS:  Neurologic Medications  acetaminophen   IVPB .. 1000 milliGRAM(s) IV Intermittent once  HYDROmorphone  Injectable 0.25 milliGRAM(s) IV Push every 6 hours PRN Severe Pain (7 - 10)    Respiratory Medications  albuterol    90 MICROgram(s) HFA Inhaler 2 Puff(s) Inhalation every 6 hours  ipratropium 17 MICROgram(s) HFA Inhaler 1 Puff(s) Inhalation every 6 hours  sodium chloride 3%  Inhalation 4 milliLiter(s) Inhalation every 6 hours    Cardiovascular Medications    Gastrointestinal Medications  pantoprazole  Injectable 40 milliGRAM(s) IV Push daily  Parenteral Nutrition - Adult 1 Each TPN Continuous <Continuous>  sodium chloride 0.9% lock flush 10 milliLiter(s) IV Push every 1 hour PRN Pre/post blood products, medications, blood draw, and to maintain line patency    Genitourinary Medications    Hematologic/Oncologic Medications  enoxaparin Injectable 40 milliGRAM(s) SubCutaneous every 24 hours    Antimicrobial/Immunologic Medications  fluconAZOLE IVPB 200 milliGRAM(s) IV Intermittent every 24 hours  meropenem  IVPB 1000 milliGRAM(s) IV Intermittent every 8 hours    Endocrine/Metabolic Medications    Topical/Other Medications  chlorhexidine 2% Cloths 1 Application(s) Topical <User Schedule>  nystatin Powder 1 Application(s) Topical two times a day      ICU Vital Signs Last 24 Hrs  T(C): 36.6 (25 May 2025 16:00), Max: 36.6 (25 May 2025 16:00)  T(F): 97.8 (25 May 2025 16:00), Max: 97.8 (25 May 2025 16:00)  HR: 80 (25 May 2025 18:00) (75 - 100)  BP: 109/55 (25 May 2025 18:00) (79/52 - 138/64)  BP(mean): 79 (25 May 2025 18:00) (61 - 92)  ABP: --  ABP(mean): --  RR: 15 (25 May 2025 18:00) (15 - 40)  SpO2: 100% (25 May 2025 18:00) (87% - 100%)    O2 Parameters below as of 25 May 2025 18:00  Patient On (Oxygen Delivery Method): T-piece                    I&O's Summary    24 May 2025 07:01  -  25 May 2025 07:00  --------------------------------------------------------  IN: 1991.6 mL / OUT: 2005 mL / NET: -13.4 mL    25 May 2025 07:01  -  25 May 2025 19:32  --------------------------------------------------------  IN: 929.6 mL / OUT: 1970 mL / NET: -1040.4 mL      I&O's Detail    24 May 2025 07:01  -  25 May 2025 07:00  --------------------------------------------------------  IN:    Albumin 5%  - 250 mL: 250 mL    Fat Emulsion (Fish Oil &amp; Plant Based) 20% Infusion: 249.6 mL    IV PiggyBack: 100 mL    TPN (Total Parenteral Nutrition): 1392 mL  Total IN: 1991.6 mL    OUT:    Bulb (mL): 125 mL    Indwelling Catheter - Urethral (mL): 1880 mL    Nasogastric/Oral tube (mL): 0 mL  Total OUT: 2005 mL    Total NET: -13.4 mL      25 May 2025 07:01  -  25 May 2025 19:32  --------------------------------------------------------  IN:    Albumin 25%  -  50 mL: 100 mL    Fat Emulsion (Fish Oil &amp; Plant Based) 20% Infusion: 41.6 mL    IV PiggyBack: 100 mL    IV PiggyBack: 50 mL    TPN (Total Parenteral Nutrition): 638 mL  Total IN: 929.6 mL    OUT:    Bulb (mL): 60 mL    Indwelling Catheter - Urethral (mL): 1860 mL    Nasogastric/Oral tube (mL): 50 mL  Total OUT: 1970 mL    Total NET: -1040.4 mL          PHYSICAL EXAM:  Neuro: GCS 11T, follows commands, no acute distress  Resp: T- piece in place, equal chest rise b/l, no increased respiratory effort  Cardio: NSR on monitor  Abdomen: Soft, non-tender, non-distended. BULMARO in place with serous output. NGT in place at 55cm  Extremities: soft, mild edema present  : urinary cath in place, draining        CXR:     LABS:  CAPILLARY BLOOD GLUCOSE      POCT Blood Glucose.: 127 mg/dL (25 May 2025 18:17)  POCT Blood Glucose.: 122 mg/dL (25 May 2025 11:51)  POCT Blood Glucose.: 135 mg/dL (25 May 2025 05:55)  POCT Blood Glucose.: 153 mg/dL (25 May 2025 00:34)                          7.5    11.28 )-----------( 186      ( 25 May 2025 17:45 )             23.8       05-25    146  |  111[H]  |  37[H]  ----------------------------<  107[H]  3.9   |  27  |  0.8    Ca    10.5      25 May 2025 17:45  Phos  2.5     05-25  Mg     2.0     05-25              Urinalysis Basic - ( 25 May 2025 17:45 )    Color: x / Appearance: x / SG: x / pH: x  Gluc: 107 mg/dL / Ketone: x  / Bili: x / Urobili: x   Blood: x / Protein: x / Nitrite: x   Leuk Esterase: x / RBC: x / WBC x   Sq Epi: x / Non Sq Epi: x / Bacteria: x

## 2025-05-25 NOTE — PROGRESS NOTE ADULT - CRITICAL CARE ATTENDING COMMENT
The patient's injury acutely impairs one or more vital organ systems, and there is a high probability of imminent or life threatening deterioration in the patient’s condition. The care of this patient requires complex medical decision making in the field of Infectious Diseases and extensive interpretation of laboratory, microbiological, and radiographic data
Patient has serous output from BULMARO drain.  T.oliva in BULMARO was 0.3 (normal)  Remains on T piece, looks deconditioned.  CXR shows increased opacification on right side  Had PICC  placed last night and started on TPN.  WBC 9.  Afebrile.    PE:  AAO x1  Chest: decreased breath sounds in bilateral lung bases  CV : iRRR  Abdomen: soft, mildly tender    ASSESSMENT:  75 y/o female with Perforated Pyloric Ulcer.  Peritonitis.  S/P Ex. Lap, Antrectomy and BII.  Acute respiratory failure with hypoxia.  Hypotension. Hypovolemia.  HFrEF.  A.fib. DM.  Dysphagia.  Moderate protein malnutrition.  NOLA, resolving  Physical deconditioning.  COVID+.    PLAN:  - pain control - use Tylenol prn; Dilaudid 2.5 mg prn severe pain  - put on IPV percussive ventilation  - continue pulmonary toilet  - trach suctioning; chest PT  - Duoneb nebulizer treatments q6h prn; use 3% saline nebulizer mucolytic   - keep normotensive; give Lasix 20 mg iv   - NPO, on TPN  - on Protonix 40 mg daily  - follow serum electrolytes and UOP  - ID - Meropenem  - DVT proph
The patient's injury acutely impairs one or more vital organ systems, and there is a high probability of imminent or life threatening deterioration in the patient’s condition. The care of this patient requires complex medical decision making in the field of Infectious Diseases and extensive interpretation of laboratory, microbiological, and radiographic data
Patient requires IPVs q8h due to increased secretions.  Afebrile.  On TPN.  BULMARO drained 50 ml  WBC 11 today.    PE:  AAO x1  Chest: decreased breath sounds in bilateral lung bases, R>L  CV : iRRR  Abdomen: soft    ASSESSMENT:  75 y/o female with Perforated Pyloric Ulcer.  Peritonitis.  S/P Ex. Lap, Antrectomy and B II.  Acute respiratory failure with hypoxia.  Hypotension. Hypovolemia.  At risk for hemodynamic instability.  HFrEF.  A.fib. DM.  Dysphagia.  Moderate protein malnutrition.  Physical deconditioning.    PLAN:  - pain control - use Tylenol prn  - IPV percussive ventilation q8h  - needs aggressive trach suctioning  - chest PT  - Duoneb nebulizer treatments q6h prn; use 3% saline nebulizer mucolytic   - keep normotensive  - NPO, on TPN  - Protonix 40 mg daily  - follow serum electrolytes and UOP  - ID - Meropenem and Diflucan  - DVT proph  - GI follow up needed for Endoscopy and placement of GJ feeding tube  SICU care
The patient's injury acutely impairs one or more vital organ systems, and there is a high probability of imminent or life threatening deterioration in the patient’s condition. The care of this patient requires complex medical decision making in the field of Infectious Diseases and extensive interpretation of laboratory, microbiological, and radiographic data
The patient's injury acutely impairs one or more vital organ systems, and there is a high probability of imminent or life threatening deterioration in the patient’s condition. The care of this patient requires complex medical decision making in the field of Infectious Diseases and extensive interpretation of laboratory, microbiological, and radiographic data
POD 5 after Second look laparotomy.  Patient had J-tube feeds held as draining via NGT.  Reviewed of her recent CT Abd/Pelvis - shows J-tube coiled in stomach.  Remains off Vent support.  Requires frequent trach suctioning.  Remains on isolation precautions for COVID+  Hb 7.5  WBC 9   Creat 0.9, better  Looks very weak and deconditioned.    PE:  AAO x1  Chest: decreased breath sounds in bilateral lung bases  CV : iRRR  Abdomen: soft, mildly tender    ASSESSMENT:  73 y/o female with Perforated Pyloric Ulcer.  Peritonitis.  S/P Ex. Lap, Antrectomy and BII.  Acute respiratory failure with hypoxia.  Hypotension. Hypovolemia.  HFrEF.  A.fib. DM.  Dysphagia.  Moderate protein malnutrition.  NOLA, resolving  Physical deconditioning.    PLAN:  - pain control - use Tylenol prn pain; Dilaudid prn severe pain  - continue pulmonary toilet  - trach suctioning; chest PT  - Duoneb nebulizer treatments q6h prn  - keep MAP>65; euvolemic  - GI eval for replacement of feeding tube and placement on GJ-tube  - on Protonix 40 mg bid  - follow serum electrolytes and UOP  - ID - Meropenem and Caspofungin  - DVT proph  - PT
POD 3 after second look laparotomy.  Patient is off Vent support; on T-collar.  AB.39/38/101  CXR with atelectasis  Off Levo, on Vaso 0.03 u  Follows simple commands.  Volume +600 ml/24h  Creat 0.9, coming down from 3.9 on admission  Has BULMARO with greenish/yellowish discharge since yesterday, about 300 ml/24h    PE:  AAO x2  Chest: decreased breath sounds in bilateral lung bases  CV : iRRR  Abdomen: soft, mildly tender    ASSESSMENT:  75 y/o female with Perforated Pyloric Ulcer.  Peritonitis.  S/P Ex. Lap, Antrectomy and BII.  Acute respiratory failure with hypoxia.  Hypotension. Hypovolemia.  HFrEF.  A.fib. DM.  Dysphagia.  Moderate protein malnutrition.  NOLA, resolving    PLAN:  - pain control - use Ofirmev iv prn; Dilaudid prn severe pain  - needs aggressive trach suctioning  - aggressive chest PT  - on T-collar  - keep MAP>65; reaching euvolemia; wean Vaso  - NPO; NGT to suction  - needs CT Abd/Pelvis with po and iv contrast to evaluate for anastomotic leak  - follow serum electrolytes and UOP  - ID  - on Meropenem and Caspofungin  - ID f/u needed  - DVT proph  Continue SICU care.
POD 4.  Patient remains very deconditioned.  Had CT Chest/Abd/Pelvis yesterday - no leak was found  BULMARO had 220 ml yellow output  Off pressors.  WBC 10, Hb 7.9    PE:  AAO x2  Chest: decreased breath sounds in bilateral lung bases  CV : iRRR  Abdomen: soft, mildly tender    ASSESSMENT:  75 y/o female with Perforated Pyloric Ulcer.  Peritonitis.  S/P Ex. Lap, Antrectomy and BII.  Acute respiratory failure with hypoxia.  Hypotension. Hypovolemia.  HFrEF.  A.fib. DM.  Dysphagia.  Moderate protein malnutrition.  NOLA, resolving    PLAN:  - pain control - use Tylenol,  Dilaudid prn severe pain  - aggressive trach suctioning; chest PT  - on T-collar; Duoneb nebulizer treatments  - keep MAP>65; euvolemic at this time  - start J-tube trickle feeds at 10-20 ml/h  - follow serum electrolytes and UOP  - ID - on Meropenem and Caspofungin  - ID f/u needed  - DVT proph  - PT  SICU care
Patient is awake.  On T piece.  Has copious secretions from tracheostomy and needs frequent suctioning.  Bilirubin in BULMARO drain - is elevated, +cervantes.  BULMARO had 630 ml/24h, dark yellow.  Tube study at bedside yesterday showed feeding tube in stomach.    PE:  AAO x1  Chest: decreased breath sounds in bilateral lung bases, L>R  CV : iRRR  Abdomen: soft, mildly tender    ASSESSMENT:  75 y/o female with Perforated Pyloric Ulcer.  Peritonitis.  S/P Ex. Lap, Antrectomy and BII.  Acute respiratory failure with hypoxia.  Hypotension. Hypovolemia.  HFrEF.  A.fib. DM.  Dysphagia.  Moderate protein malnutrition.  NOLA, resolving  Physical deconditioning.  COVID+.    PLAN:  - pain control - use Tylenol prn pain  - continue pulmonary toilet  - trach suctioning; chest PT  - Duoneb nebulizer treatments q6h prn; use 3% saline nebulizer mucolytic   - keep MAP>65; total body fluid overloaded - Lasix 20 mg iv x1  - GI f/u for replacement of feeding tube and placement on GJ-tube  - on Protonix 40 mg bid  - follow serum electrolytes and UOP  - ID - Meropenem and Caspofungin  - DVT proph
Patient remains in A.fib but is rate controlled.  Has increased secretions from trach - requires frequent suctioning  BULMARO drain drained 135 serous  Creat 0.7  Has significant peripheral edema    PE:  AAO x1  Chest: decreased breath sounds in bilateral lung bases, L>R  CV : iRRR  Abdomen: soft, mildly tender    ASSESSMENT:  75 y/o female with Perforated Pyloric Ulcer.  Peritonitis.  S/P Ex. Lap, Antrectomy and BII.  Acute respiratory failure with hypoxia.  Hypotension. Hypovolemia.  HFrEF.  A.fib. DM.  Dysphagia.  Moderate protein malnutrition.  NOLA, resolving  Physical deconditioning.  COVID+.    PLAN:  - pain control - use Tylenol prn pain  - continue pulmonary toilet  - trach suctioning; chest PT  - Duoneb nebulizer treatments q6h prn; use 3% saline nebulizer mucolytic   - keep normotenbsive; give Lasix 20 mg iv again today  - GI f/u - discussed and feel EGD too risky at this time  - needs to start TPN today  - on Protonix 40 mg bid  - follow serum electrolytes and UOP  - ID - Meropenem and Diflucan  - DVT proph  - remains on isolation for COVID+

## 2025-05-25 NOTE — PROGRESS NOTE ADULT - ASSESSMENT
74F found to have perforated peptic ulcer now s/p ex lap, distal antrectomy, abthera placement requiring vasopressors. RTOR 5/16 s/p second look laparotomy, Billroth II reconstruction, gastrojejunostomy tube replaced with a jejunal feeding tube, abdomen closed at the end of the case.     NEUROLOGICAL:  #Acute pain  - IV APAP while NPO   - Dilaudid 0.25 PRN    RESPIRATORY:   #Respiratory failure, s/p tracheostomy (11/24)    - now with 8 portex cuffed trach (switched in OR on 5/14)    - tolerating T- piece    - continue duonebs   #Decreased small bibasilar opacities/atelectasis, right greater than left on CT  - 5/16: s/p deep suctioning by anesthesia w/ copious secretions   #h/o asthma  - atrovent and albuterol   #Increased secretions  - 3% inhaled NS  # COVID+ on admission  - s/p Remdesivir  - off contact precautions 5/24  #Activity   - increase as tolerated     CARDS:   #Nonsustained Vtach - self resolved   - cards consult: management of septic shock. Start short acting AC for afib. Start beta blockers once off pressors   #hypotension 2/2 sepsis   - off vasopressors since 5/19  - MAP >65  - Holding midodrine 10 mg Q8h while NPO  #hx afib  - transiently was in AFIB slow ventricular response (HR 40s-60s)  - holding home Xarelto 15 QD  #hx HTN  - holding home losartan 100QD  #elevated troponin  - 143 > 84, no longer trending  #TTE 11/24: EF 71%. Mild-mod AS. Trivial pericardial effusion.   - Echo 5/15: EF 30-35%, multiple left ventricular motion wall abnormalities. Possible Takotsubo vs ischemic. Global left ventricular systolic function.     GASTROINTESTINAL/NUTRITION:   #perforated prepyloric gastric ulcer s/p exploratory laparotomy antrectomy and D1 stapled off with temporary abdominal closure and abthera in place  - 5/16: RTOR s/p bilroth 2, J tube placement, RLQ maliha drain by anastomosis extending to duodenal stump  - 5/18: BULMARO drain w/ bilious output > pt made strict NPO, BULMARO drain bilirubin 9.7, LFTs WNL  #Diet, NPO strict  - 5/23: s/p PICC by IR, started on TPN  - J tube study performed 5/21 --> GI consulted for exchange of J for GJ--> as of 5/23 GI is holding off on exchange for now  - aspiration precautions, HOB 30  #GI Prophylaxis    - protonix 40mg QD IV  #Bowel regimen    -holding    -last bowel movement 5/24    /RENAL:   #urine output in critically ill    -chronic indwelling ya > new ya exchanged 5/17  #metabolic acidosis, resolved  #NOLA (baseline creatine 0.6), improving   - nephrology consulted > no RRT at this time  #Hx of solitary kidney    Labs:          BUN/Cr- 13/0.7  -->,  18/0.6  -->          [05-24 @ 06:08]Na  143 // K  4.2 // Mg  1.9 // Phos  2.7    HEME/ONC:   #DVT prophylaxis    -Lovenox    -SCDs  #anemia  - 1pRBC 5/17 to help wean off vasopressors    Labs: Hb/Hct:  8.5/26.3  (05-21 @ 23:28)  -->,  8.9/27.9  (05-22 @ 23:05)  -->                      Plts:  245  -->,  236  -->                 PTT/INR:          ID:  #perforated duodenal ulcer  - Meropenem (5/16 - )  - Nystatin powder for groin rash (5/15 - )  Completed antibiotics:  - zosyn (renal dosing) (5/14 - 5/16)  - caspofungin per primary team (5/14 - 5/20)  - Fluconazole 200mg q24 (end date 5/24)  #COVID positive on admission --- resolved    - completed remdesivir 2 day course    - completed isolation precautions 5/23  #Cultures    - 5/13: blood culture: NGTD    - 5/13: blood culture: NGTD    - 5/13: urine culture: E Coli  - ID following  WBC- 10.20  --->>,  9.87  --->>  Temp trend- 24hrs T(F): 97.5 (05-25 @ 00:00), Max: 98.2 (05-24 @ 08:00)    ENDOCRINE:  #Maintain euglycemia    -FSG q6 while NPO    -Glucose goal 140-180. If above 180, will start corrective insulin sliding scale  #hypercalcemia  - HOLDING  home cinacalcet 30mg bid    MSK:  #Activity - increase as tolerated   #hx osteoporosis  - holding home Alendronate 70mg weekly on Thursdays while NPO    SKIN:  #DTI screening negative 5/23    - will continue to monitor daily skin changes    PALLIATIVE:  Currently full code, palliative consult       LINES/DRAINS:  PIV, Midline Catheter x2 (5/20-) Ya (replaced 5/17), J tube (5/16), 8 cuffed portex trach    Discontinued lines: R radial arterial line (5/14- 5/22), LIJ CVC (5/14- 5/20)  ADVANCED DIRECTIVES:  Full Code    HCP/Emergency Contact- daughter Cecilia [phone]  INDICATION FOR SICU/SDU:  perforated peptic ulcer; mechanical ventilation/vasopressors     DISPO:  SICU. Case to be discussed with attending Dr. Pedersen   74F found to have perforated peptic ulcer now s/p ex lap, distal antrectomy, abthera placement requiring vasopressors. RTOR 5/16 s/p second look laparotomy, Billroth II reconstruction, gastrojejunostomy tube replaced with a jejunal feeding tube, abdomen closed at the end of the case.     NEUROLOGICAL:  #Acute pain  - IV APAP while NPO   - Dilaudid 0.25 PRN    RESPIRATORY:   #Respiratory failure, s/p tracheostomy (11/24)    - now with 8 portex cuffed trach (switched in OR on 5/14)    - tolerating T- piece    - continue duonebs   #Decreased small bibasilar opacities/atelectasis, right greater than left on CT  - 5/16: s/p deep suctioning by anesthesia w/ copious secretions   #h/o asthma  - atrovent and albuterol   #Increased secretions  - 3% inhaled NS  # COVID+ on admission  - s/p Remdesivir  - off contact precautions 5/24  #Activity   - increase as tolerated     CARDS:   #Nonsustained Vtach - self resolved   - cards consult: management of septic shock. Start short acting AC for afib. Start beta blockers once off pressors   #hypotension 2/2 sepsis   - off vasopressors since 5/19  -25% 50cc x 1 for SBP 90s, improved to 120s   - MAP >65  - Holding midodrine 10 mg Q8h while NPO  #hx afib  - transiently was in AFIB slow ventricular response (HR 40s-60s)  - holding home Xarelto 15 QD  #hx HTN  - holding home losartan 100QD  #elevated troponin  - 143 > 84, no longer trending  #TTE 11/24: EF 71%. Mild-mod AS. Trivial pericardial effusion.   - Echo 5/15: EF 30-35%, multiple left ventricular motion wall abnormalities. Possible Takotsubo vs ischemic. Global left ventricular systolic function.     GASTROINTESTINAL/NUTRITION:   #perforated prepyloric gastric ulcer s/p exploratory laparotomy antrectomy and D1 stapled off with temporary abdominal closure and abthera in place  - 5/16: RTOR s/p bilroth 2, J tube placement, RLQ maliha drain by anastomosis extending to duodenal stump  - 5/18: BULMARO drain w/ bilious output > pt made strict NPO, BULMARO drain bilirubin 9.7, LFTs WNL  #Diet, NPO strict  - 5/23: s/p PICC by IR, started on TPN  - J tube study performed 5/21 --> GI consulted for exchange of J for GJ--> as of 5/23 GI is holding off on exchange for now  - aspiration precautions, HOB 30  #GI Prophylaxis    - protonix 40mg QD IV  #Bowel regimen    -holding    -last bowel movement 5/24    /RENAL:   #urine output in critically ill    -chronic indwelling ya > new ya exchanged 5/17  #metabolic acidosis, resolved  #NOLA (baseline creatine 0.6), improving   - nephrology consulted > no RRT at this time  #Hx of solitary kidney    Labs:          BUN/Cr- 13/0.7  -->,  18/0.6  -->          [05-24 @ 06:08]Na  143 // K  4.2 // Mg  1.9 // Phos  2.7    HEME/ONC:   #DVT prophylaxis    -Lovenox    -SCDs  #anemia  - 1pRBC 5/17 to help wean off vasopressors    Labs: Hb/Hct:  8.5/26.3  (05-21 @ 23:28)  -->,  8.9/27.9  (05-22 @ 23:05)  -->                      Plts:  245  -->,  236  -->                 PTT/INR:        ID:  #perforated duodenal ulcer  - Meropenem (5/16 - )  - Nystatin powder for groin rash (5/15 - )  Completed antibiotics:  - zosyn (renal dosing) (5/14 - 5/16)  - caspofungin per primary team (5/14 - 5/20)  - Fluconazole 200mg q24 (end date 5/24)  #COVID positive on admission --- resolved    - completed remdesivir 2 day course    - completed isolation precautions 5/23  #Cultures    - 5/13: blood culture: NGTD    - 5/13: blood culture: NGTD    - 5/13: urine culture: E Coli  - ID following  WBC- 10.20  --->>,  9.87  --->>  Temp trend- 24hrs T(F): 97.5 (05-25 @ 00:00), Max: 98.2 (05-24 @ 08:00)    ENDOCRINE:  #Maintain euglycemia    -FSG q6 while NPO    -Glucose goal 140-180. If above 180, will start corrective insulin sliding scale  #hypercalcemia  - HOLDING  home cinacalcet 30mg bid    MSK:  #Activity - increase as tolerated   #hx osteoporosis  - holding home Alendronate 70mg weekly on Thursdays while NPO    SKIN:  #DTI screen  -stage 2 sacral ulcer   -wound c/s placed    - will continue to monitor daily skin changes    PALLIATIVE:  Currently full code, palliative consult       LINES/DRAINS:  PIV, Midline Catheter x2 (5/20-) Ya (replaced 5/17), J tube (5/16), 8 cuffed portex trach    Discontinued lines: R radial arterial line (5/14- 5/22), LIJ CVC (5/14- 5/20)  ADVANCED DIRECTIVES:  Full Code    HCP/Emergency Contact- daughter Cecilia [phone]  INDICATION FOR SICU/SDU:  perforated peptic ulcer; mechanical ventilation/vasopressors     DISPO:  SICU. Case to be discussed with attending Dr. Pedersen

## 2025-05-25 NOTE — PROGRESS NOTE ADULT - SUBJECTIVE AND OBJECTIVE BOX
GENERAL SURGERY PROGRESS NOTE    Patient: PATRICIA SPRAGUE , 74y ([birth year])Female   MRN: [id number]  Location: [address]  Visit: 05-13-25 Inpatient  Date: 05-25-25 @ 04:25    Events of past 24 hours:  satting well onvent settings 50/5, BULMARO drain with serous output, TPN running    PAST MEDICAL & SURGICAL HISTORY:  HTN (hypertension)      Diabetes mellitus      Obesity      Gastroesophageal reflux disease      Active asthma      Generalized OA      Afib      H/O hernia repair  2011 and revised in 2013      History of cholecystectomy      Status post debridement      H/O tracheostomy      S/P gastrostomy      S/P jejunostomy          Vitals:   T(F): 97.5 (05-25-25 @ 00:00), Max: 98.2 (05-24-25 @ 08:00)  HR: 79 (05-25-25 @ 03:00)  BP: 79/52 (05-25-25 @ 03:00)  RR: 17 (05-25-25 @ 03:00)  SpO2: 100% (05-25-25 @ 03:00)      Diet, NPO      Fluids:     I & O's:    05-23-25 @ 07:01  -  05-24-25 @ 07:00  --------------------------------------------------------  IN:    dextrose 5% + sodium chloride 0.45%: 250 mL    dextrose 5% + sodium chloride 0.45%: 300 mL    IV PiggyBack: 50 mL    IV PiggyBack: 100 mL    TPN (Total Parenteral Nutrition): 638 mL  Total IN: 1338 mL    OUT:    Bulb (mL): 310 mL    Ureteral Catheter (mL): 2890 mL  Total OUT: 3200 mL    Total NET: -1862 mL    PHYSICAL EXAM:  General: NAD  Cardiac: RRR  Respiratory: satting well on vent settings 50/5  Abdomen: Soft, non-distended, non-tender  Vascular: Pulses 2+ throughout, extremities well perfused  Skin: Warm/dry, normal color, no jaundice    MEDICATIONS  (STANDING):  acetaminophen   IVPB .. 1000 milliGRAM(s) IV Intermittent once  albuterol    90 MICROgram(s) HFA Inhaler 2 Puff(s) Inhalation every 6 hours  chlorhexidine 2% Cloths 1 Application(s) Topical <User Schedule>  enoxaparin Injectable 40 milliGRAM(s) SubCutaneous every 24 hours  ipratropium 17 MICROgram(s) HFA Inhaler 1 Puff(s) Inhalation every 6 hours  lipid, fat emulsion (Fish Oil and Plant Based) 20% Infusion 0.5045 Gm/kG/Day (20.8 mL/Hr) IV Continuous <Continuous>  meropenem  IVPB 1000 milliGRAM(s) IV Intermittent every 8 hours  nystatin Powder 1 Application(s) Topical two times a day  pantoprazole  Injectable 40 milliGRAM(s) IV Push daily  Parenteral Nutrition - Adult 1 Each (58 mL/Hr) TPN Continuous <Continuous>  sodium chloride 3%  Inhalation 4 milliLiter(s) Inhalation every 12 hours    MEDICATIONS  (PRN):  HYDROmorphone  Injectable 0.25 milliGRAM(s) IV Push every 6 hours PRN Severe Pain (7 - 10)  sodium chloride 0.9% lock flush 10 milliLiter(s) IV Push every 1 hour PRN Pre/post blood products, medications, blood draw, and to maintain line patency      DVT PROPHYLAXIS: enoxaparin Injectable 40 milliGRAM(s) SubCutaneous every 24 hours    GI PROPHYLAXIS: pantoprazole  Injectable 40 milliGRAM(s) IV Push daily     ANTIBIOTICS:  meropenem  IVPB 1000 milliGRAM(s)    LAB/STUDIES:  Labs:  CAPILLARY BLOOD GLUCOSE      POCT Blood Glucose.: 153 mg/dL (25 May 2025 00:34)  POCT Blood Glucose.: 170 mg/dL (24 May 2025 18:59)  POCT Blood Glucose.: 131 mg/dL (24 May 2025 06:03)                          8.7    9.87  )-----------( 247      ( 24 May 2025 06:08 )             27.1       Auto Immature Granulocyte %: 1.0 % (05-24-25 @ 06:08)    05-24    143  |  110  |  18  ----------------------------<  139[H]  4.2   |  23  |  0.6[L]      Calcium: 9.1 mg/dL (05-24-25 @ 06:08)     ABG - ( 19 May 2025 04:13 )  pH: 7.39  /  pCO2: 38    /  pO2: 101   / HCO3: 23    / Base Excess: -1.8  /  SaO2: 98.0      IMAGING:  < from: Xray Chest 1 View- PORTABLE-Routine (Xray Chest 1 View- PORTABLE-Routine in AM.) (05.24.25 @ 06:42) >  IMPRESSION:    Interval removal of left PICC. NG tube terminating just below the   diaphragm with side port at the level of the diaphragm. Recommend   advancement. Tracheostomy.    Increased right effusion/opacities. Decreased left basal   pleural-parenchymal opacities. No pneumothorax.    Stable cardiomediastinal silhouette.    Unchanged osseous structures.    < end of copied text >

## 2025-05-25 NOTE — PROGRESS NOTE ADULT - ASSESSMENT
74y old Female with above PMHx, s/p exlap and second look laparotomy with billroth II and placement of J tube    Plan:  - off isolation precautions  - monitor respiratory status  - TPN  - continue abx   - rest of care per SICU     SURGERY SPECTRA: [id number]

## 2025-05-26 LAB
ALBUMIN SERPL ELPH-MCNC: 2.7 G/DL — LOW (ref 3.5–5.2)
ALP SERPL-CCNC: 95 U/L — SIGNIFICANT CHANGE UP (ref 30–115)
ALT FLD-CCNC: 7 U/L — SIGNIFICANT CHANGE UP (ref 0–41)
ANION GAP SERPL CALC-SCNC: 12 MMOL/L — SIGNIFICANT CHANGE UP (ref 7–14)
ANION GAP SERPL CALC-SCNC: 8 MMOL/L — SIGNIFICANT CHANGE UP (ref 7–14)
AST SERPL-CCNC: 15 U/L — SIGNIFICANT CHANGE UP (ref 0–41)
BILIRUB SERPL-MCNC: 0.7 MG/DL — SIGNIFICANT CHANGE UP (ref 0.2–1.2)
BUN SERPL-MCNC: 40 MG/DL — HIGH (ref 10–20)
BUN SERPL-MCNC: 41 MG/DL — HIGH (ref 10–20)
CALCIUM SERPL-MCNC: 10.5 MG/DL — SIGNIFICANT CHANGE UP (ref 8.4–10.5)
CALCIUM SERPL-MCNC: 10.5 MG/DL — SIGNIFICANT CHANGE UP (ref 8.4–10.5)
CHLORIDE SERPL-SCNC: 111 MMOL/L — HIGH (ref 98–110)
CHLORIDE SERPL-SCNC: 112 MMOL/L — HIGH (ref 98–110)
CO2 SERPL-SCNC: 24 MMOL/L — SIGNIFICANT CHANGE UP (ref 17–32)
CO2 SERPL-SCNC: 27 MMOL/L — SIGNIFICANT CHANGE UP (ref 17–32)
CREAT SERPL-MCNC: 0.7 MG/DL — SIGNIFICANT CHANGE UP (ref 0.7–1.5)
CREAT SERPL-MCNC: 0.7 MG/DL — SIGNIFICANT CHANGE UP (ref 0.7–1.5)
EGFR: 92 ML/MIN/1.73M2 — SIGNIFICANT CHANGE UP
GLUCOSE SERPL-MCNC: 115 MG/DL — HIGH (ref 70–99)
GLUCOSE SERPL-MCNC: 115 MG/DL — HIGH (ref 70–99)
HCT VFR BLD CALC: 24.2 % — LOW (ref 37–47)
HGB BLD-MCNC: 7.6 G/DL — LOW (ref 12–16)
MAGNESIUM SERPL-MCNC: 2 MG/DL — SIGNIFICANT CHANGE UP (ref 1.8–2.4)
MAGNESIUM SERPL-MCNC: 2.4 MG/DL — SIGNIFICANT CHANGE UP (ref 1.8–2.4)
MCHC RBC-ENTMCNC: 27.4 PG — SIGNIFICANT CHANGE UP (ref 27–31)
MCHC RBC-ENTMCNC: 31.4 G/DL — LOW (ref 32–37)
MCV RBC AUTO: 87.4 FL — SIGNIFICANT CHANGE UP (ref 81–99)
NRBC BLD AUTO-RTO: 0 /100 WBCS — SIGNIFICANT CHANGE UP (ref 0–0)
PHOSPHATE SERPL-MCNC: 3 MG/DL — SIGNIFICANT CHANGE UP (ref 2.1–4.9)
PHOSPHATE SERPL-MCNC: 3 MG/DL — SIGNIFICANT CHANGE UP (ref 2.1–4.9)
PLATELET # BLD AUTO: 199 K/UL — SIGNIFICANT CHANGE UP (ref 130–400)
PMV BLD: 11.1 FL — HIGH (ref 7.4–10.4)
POTASSIUM SERPL-MCNC: 4.2 MMOL/L — SIGNIFICANT CHANGE UP (ref 3.5–5)
POTASSIUM SERPL-MCNC: 4.4 MMOL/L — SIGNIFICANT CHANGE UP (ref 3.5–5)
POTASSIUM SERPL-SCNC: 4.2 MMOL/L — SIGNIFICANT CHANGE UP (ref 3.5–5)
POTASSIUM SERPL-SCNC: 4.4 MMOL/L — SIGNIFICANT CHANGE UP (ref 3.5–5)
PROT SERPL-MCNC: 4.4 G/DL — LOW (ref 6–8)
RBC # BLD: 2.77 M/UL — LOW (ref 4.2–5.4)
RBC # FLD: 16.6 % — HIGH (ref 11.5–14.5)
SODIUM SERPL-SCNC: 146 MMOL/L — SIGNIFICANT CHANGE UP (ref 135–146)
SODIUM SERPL-SCNC: 148 MMOL/L — HIGH (ref 135–146)
WBC # BLD: 10.92 K/UL — HIGH (ref 4.8–10.8)
WBC # FLD AUTO: 10.92 K/UL — HIGH (ref 4.8–10.8)

## 2025-05-26 PROCEDURE — 99233 SBSQ HOSP IP/OBS HIGH 50: CPT

## 2025-05-26 PROCEDURE — 99291 CRITICAL CARE FIRST HOUR: CPT | Mod: 24,25

## 2025-05-26 PROCEDURE — 71045 X-RAY EXAM CHEST 1 VIEW: CPT | Mod: 26

## 2025-05-26 RX ORDER — FUROSEMIDE 10 MG/ML
20 INJECTION INTRAMUSCULAR; INTRAVENOUS ONCE
Refills: 0 | Status: COMPLETED | OUTPATIENT
Start: 2025-05-26 | End: 2025-05-26

## 2025-05-26 RX ORDER — I.V. FAT EMULSION 20 G/100ML
0.5 EMULSION INTRAVENOUS
Qty: 50 | Refills: 0 | Status: DISCONTINUED | OUTPATIENT
Start: 2025-05-26 | End: 2025-05-26

## 2025-05-26 RX ORDER — SODIUM/POT/MAG/CALC/CHLOR/ACET 35-20-5MEQ
1 VIAL (ML) INTRAVENOUS
Refills: 0 | Status: DISCONTINUED | OUTPATIENT
Start: 2025-05-26 | End: 2025-05-26

## 2025-05-26 RX ORDER — IPRATROPIUM BROMIDE AND ALBUTEROL SULFATE .5; 2.5 MG/3ML; MG/3ML
3 SOLUTION RESPIRATORY (INHALATION) EVERY 6 HOURS
Refills: 0 | Status: DISCONTINUED | OUTPATIENT
Start: 2025-05-26 | End: 2025-06-03

## 2025-05-26 RX ORDER — ALBUMIN (HUMAN) 12.5 G/50ML
50 INJECTION, SOLUTION INTRAVENOUS ONCE
Refills: 0 | Status: COMPLETED | OUTPATIENT
Start: 2025-05-26 | End: 2025-05-26

## 2025-05-26 RX ADMIN — NYSTATIN 1 APPLICATION(S): 100000 CREAM TOPICAL at 05:39

## 2025-05-26 RX ADMIN — Medication 2 PUFF(S): at 02:52

## 2025-05-26 RX ADMIN — Medication 1 PUFF(S): at 08:23

## 2025-05-26 RX ADMIN — Medication 1 PUFF(S): at 02:56

## 2025-05-26 RX ADMIN — ENOXAPARIN SODIUM 40 MILLIGRAM(S): 100 INJECTION SUBCUTANEOUS at 12:10

## 2025-05-26 RX ADMIN — Medication 0.25 MILLIGRAM(S): at 06:08

## 2025-05-26 RX ADMIN — Medication 4 MILLILITER(S): at 15:05

## 2025-05-26 RX ADMIN — FUROSEMIDE 20 MILLIGRAM(S): 10 INJECTION INTRAMUSCULAR; INTRAVENOUS at 14:54

## 2025-05-26 RX ADMIN — MEROPENEM 100 MILLIGRAM(S): 1 INJECTION INTRAVENOUS at 14:54

## 2025-05-26 RX ADMIN — Medication 0.25 MILLIGRAM(S): at 07:00

## 2025-05-26 RX ADMIN — I.V. FAT EMULSION 20.8 GM/KG/DAY: 20 EMULSION INTRAVENOUS at 20:50

## 2025-05-26 RX ADMIN — IPRATROPIUM BROMIDE AND ALBUTEROL SULFATE 3 MILLILITER(S): .5; 2.5 SOLUTION RESPIRATORY (INHALATION) at 19:56

## 2025-05-26 RX ADMIN — Medication 2 PUFF(S): at 08:22

## 2025-05-26 RX ADMIN — Medication 40 MILLIGRAM(S): at 12:10

## 2025-05-26 RX ADMIN — Medication 58 EACH: at 20:50

## 2025-05-26 RX ADMIN — IPRATROPIUM BROMIDE AND ALBUTEROL SULFATE 3 MILLILITER(S): .5; 2.5 SOLUTION RESPIRATORY (INHALATION) at 15:05

## 2025-05-26 RX ADMIN — MEROPENEM 100 MILLIGRAM(S): 1 INJECTION INTRAVENOUS at 05:39

## 2025-05-26 RX ADMIN — Medication 4 MILLILITER(S): at 19:56

## 2025-05-26 RX ADMIN — MEROPENEM 100 MILLIGRAM(S): 1 INJECTION INTRAVENOUS at 21:59

## 2025-05-26 RX ADMIN — NYSTATIN 1 APPLICATION(S): 100000 CREAM TOPICAL at 17:56

## 2025-05-26 RX ADMIN — Medication 4 MILLILITER(S): at 02:57

## 2025-05-26 RX ADMIN — Medication 100 MILLIGRAM(S): at 12:10

## 2025-05-26 RX ADMIN — ALBUMIN (HUMAN) 50 MILLILITER(S): 12.5 INJECTION, SOLUTION INTRAVENOUS at 12:11

## 2025-05-26 RX ADMIN — Medication 1 APPLICATION(S): at 05:38

## 2025-05-26 RX ADMIN — Medication 4 MILLILITER(S): at 08:23

## 2025-05-26 NOTE — PROGRESS NOTE ADULT - ATTENDING COMMENTS
Agree with the above.  Patient with perforated gastric ulcer status post complicated Billroth II surgery with anastomotic leak status post PEG-J placement that is not malfunctioning. She will need PEG-J adjustment. N.p.o. after midnight for PEG-J tube adjustment tomorrow.  Rest of management per surgery. Rest of recommendations per the note above.       Time-based billing (NON-critical care).   50 minutes spent on total encounter which excludes teaching time and/or separately reported services; more than 50% of the visit was spent counseling and / or coordinating care by the attending physician.  The necessity of the time spent during the encounter on this date of service was due to: Coordination of care.

## 2025-05-26 NOTE — PROGRESS NOTE ADULT - ASSESSMENT
74F found to have perforated peptic ulcer now s/p ex lap, distal antrectomy, abthera placement requiring vasopressors. RTOR 5/16 s/p second look laparotomy, Billroth II reconstruction, gastrojejunostomy tube replaced with a jejunal feeding tube, abdomen closed at the end of the case.     NEUROLOGICAL:  #Acute pain  - IV APAP while NPO   - Dilaudid 0.25 PRN    RESPIRATORY:   #Respiratory failure, s/p tracheostomy (11/24)    - now with 8 portex cuffed trach (switched in OR on 5/14)    - tolerating T- piece    - continue duonebs    - lasix for diuresis  #Decreased small bibasilar opacities/atelectasis, right greater than left on CT  - 5/16: s/p deep suctioning by anesthesia w/ copious secretions   #h/o asthma  - atrovent and albuterol   #Increased secretions  - 3% inhaled NS  # COVID+ on admission --- resolved  - s/p Remdesivir  - off contact precautions 5/24  #Activity   - increase as tolerated     CARDS:   #Nonsustained Vtach - self resolved   - cards consult: management of septic shock. Start short acting AC for afib. Start beta blockers once off pressors   #hypotension 2/2 sepsis   - off vasopressors since 5/19  -25% 50cc x 1 for SBP 90s, improved to 120s   - MAP >65  - Holding midodrine 10 mg Q8h while NPO  #hx afib  - transiently was in AFIB slow ventricular response (HR 40s-60s)  - holding home Xarelto 15 QD  #hx HTN  - holding home losartan 100QD  #elevated troponin  - 143 > 84, no longer trending  #TTE 11/24: EF 71%. Mild-mod AS. Trivial pericardial effusion.   - Echo 5/15: EF 30-35%, multiple left ventricular motion wall abnormalities. Possible Takotsubo vs ischemic. Global left ventricular systolic function.     GASTROINTESTINAL/NUTRITION:   #perforated prepyloric gastric ulcer s/p exploratory laparotomy antrectomy and D1 stapled off with temporary abdominal closure and abthera in place  - 5/16: RTOR s/p bilroth 2, J tube placement, RLQ maliha drain by anastomosis extending to duodenal stump  - 5/18: BULMARO drain w/ bilious output > pt made strict NPO, BULMARO drain bilirubin 9.7, LFTs WNL  #Diet, NPO strict  - 5/23: s/p PICC by IR, started on TPN  - J tube study performed 5/21 --> GI consulted for exchange of J for GJ--> as of 5/23 GI is holding off on exchange for now  - aspiration precautions, HOB 30  #GI Prophylaxis    - protonix 40mg QD IV  #Bowel regimen    -holding    -last bowel movement 5/25 PM    /RENAL:   #urine output in critically ill    -chronic indwelling ya > new ay exchanged 5/17  #metabolic acidosis, resolved  #NOLA (baseline creatine 0.6), improving   - nephrology consulted > no RRT at this time  #Hx of solitary kidney    Labs:          BUN/Cr- 37/0.8  -->,  40/0.7  -->          [05-26 @ 06:06]Na  146 // K  4.2 // Mg  2.4 // Phos  3.0  [05-25 @ 17:45]Na  146 // K  3.9 // Mg  2.0 // Phos  2.5      HEME/ONC:   #DVT prophylaxis    -Lovenox    -SCDs  #anemia  - 1pRBC 5/17 to help wean off vasopressors    Labs: Hb/Hct:  7.5/23.8  -->,  7.6/24.2  -->                      Plts:  186  -->,  199  -->                 PTT/INR:          ID:  #perforated duodenal ulcer  - Meropenem (5/16 - )  - Nystatin powder for groin rash (5/15 - )  Completed antibiotics:  - zosyn (renal dosing) (5/14 - 5/16)  - caspofungin per primary team (5/14 - 5/20)  - Fluconazole 200mg q24 (end date 5/24)  #COVID positive on admission --- resolved    - completed remdesivir 2 day course    - completed isolation precautions 5/23  #Cultures    - 5/13: blood culture: NGTD    - 5/13: blood culture: NGTD    - 5/13: urine culture: E Coli  - ID following  WBC- 11.33  --->>,  11.28  --->>,  10.92  --->>  Temp trend- 24hrs T(F): 97.5 (05-26 @ 04:00), Max: 97.9 (05-25 @ 20:00)  Antibiotics-fluconAZOLE IVPB 200 every 24 hours (5/20-  meropenem  IVPB 1000 every 8 hours (5/16-          ENDOCRINE:  #Maintain euglycemia    -FSG q6 while NPO    -Glucose goal 140-180. If above 180, will start corrective insulin sliding scale  #hypercalcemia  - HOLDING  home cinacalcet 30mg bid    MSK:  #Activity - increase as tolerated   #hx osteoporosis  - holding home Alendronate 70mg weekly on Thursdays while NPO    SKIN:  #DTI screen  -stage 2 sacral ulcer   -wound c/s placed    - will continue to monitor daily skin changes    PALLIATIVE:  Currently full code, palliative consult       LINES/DRAINS:  PIV, Midline Catheter x2 (5/20-) Ya (replaced 5/17), J tube (5/16), 8 cuffed portex trach    Discontinued lines: R radial arterial line (5/14- 5/22), LIJ CVC (5/14- 5/20)  ADVANCED DIRECTIVES:  Full Code    HCP/Emergency Contact- daughter Cecilia [phone]  INDICATION FOR SICU/SDU:  perforated peptic ulcer; mechanical ventilation/vasopressors     DISPO:  SICU. Case discussed with attending Dr. Peters

## 2025-05-26 NOTE — PROGRESS NOTE ADULT - SUBJECTIVE AND OBJECTIVE BOX
PATRICIA SPRAGUE   [id number]   [birth year]    74yF  ============================================================   DATE OF INITIAL SICU/SDU CONSULT: 05-13-25    INDICATION FOR SICU CONSULT:  perforated duodenal ulcer      SICU COURSE EVENTS :  05-13 - admitted to SICU service  05-14 - s/p OR for exploratory laparotomy, started remdesivir for covid +, echo ordered  5/15 - Echo showing EF 30-35%, cardiology consulted. Pending RTOR. Remains on vasopressors, weaning.  5/16: S/p Exploratory laparotomy, bilroth 2, J tube placement. Episode of wheezing. Changed to meropenem per ID. AFIB slow ventricular response HR 40s-60s, remains on low dose pressors   5/17: Tolerating T piece, trickle feed started via J tube, s/p 1u PRBC to wean off levophed, remains on vasopressin, IVL  5/18: Midodrine 10mg q8 started, weaned off levophed. BULMARO drain bilious output > strict NPO, f/u fluid composition   5/19: CTAP - No evidence of drainable fluid collection.   5/20: Trickle feeds restarted but discontinued 2/2 high gastric residual, NGT kep to suction. Caspofungin discontinued, narrowed to diclucan.  5/21: GI consulted for GJ replacement  5/23: PICC line placed with IR, started TPN in PM, IVL  5/24: diuresis with lasix. albumin 55 250cc x 1 given overnight.  5/25- 25% 50cc albumin x 1 given for SBP 90s, improved to 120s. Diuresis with 20mg lasix x 1, >1.8L response. IPV increased to Q6 hours.      24HOUR EVENTS  5/25  NIGHT  - PM rounds: HR 90s, SBP 120s, tolerating T piece, +BM  - BULMARO serous    DAY  -25% 100cc for SBP 90s  - F/u CXRAY:   - Increased frequency of IPV to q 6  - Lasix 20mg--> 1.8L response  - Clarify w. ID how long on antibiotics --> can stop if not concerned for leak  - Wound consult for DTI ( sacral Stage 2 )    - Keep antibiotics until leak is rule out    [X] A ten-point review of systems was negative except as expressed in note.  [X] History was obtained from patient. If unable to participate in their care, history obtained from review of the chart and collateral sources of information.  ============================================================   74F found to have perforated peptic ulcer now s/p ex lap, distal antrectomy, abthera placement requiring vasopressors. RTOR 5/16 s/p second look laparotomy, Billroth II reconstruction, gastrojejunostomy tube replaced with a jejunal feeding tube, abdomen closed at the end of the case.     NEUROLOGICAL:  #Acute pain  - IV APAP while NPO   - Dilaudid 0.25 PRN    RESPIRATORY:   #Respiratory failure, s/p tracheostomy (11/24)    - now with 8 portex cuffed trach (switched in OR on 5/14)    - tolerating T- piece    - continue duonebs    - lasix for diuresis  #Decreased small bibasilar opacities/atelectasis, right greater than left on CT  - 5/16: s/p deep suctioning by anesthesia w/ copious secretions   #h/o asthma  - atrovent and albuterol   #Increased secretions  - 3% inhaled NS  # COVID+ on admission --- resolved  - s/p Remdesivir  - off contact precautions 5/24  #Activity   - increase as tolerated     CARDS:   #Nonsustained Vtach - self resolved   - cards consult: management of septic shock. Start short acting AC for afib. Start beta blockers once off pressors   #hypotension 2/2 sepsis   - off vasopressors since 5/19  -25% 50cc x 1 for SBP 90s, improved to 120s   - MAP >65  - Holding midodrine 10 mg Q8h while NPO  #hx afib  - transiently was in AFIB slow ventricular response (HR 40s-60s)  - holding home Xarelto 15 QD  #hx HTN  - holding home losartan 100QD  #elevated troponin  - 143 > 84, no longer trending  #TTE 11/24: EF 71%. Mild-mod AS. Trivial pericardial effusion.   - Echo 5/15: EF 30-35%, multiple left ventricular motion wall abnormalities. Possible Takotsubo vs ischemic. Global left ventricular systolic function.     GASTROINTESTINAL/NUTRITION:   #perforated prepyloric gastric ulcer s/p exploratory laparotomy antrectomy and D1 stapled off with temporary abdominal closure and abthera in place  - 5/16: RTOR s/p bilroth 2, J tube placement, RLQ maliha drain by anastomosis extending to duodenal stump  - 5/18: BULMARO drain w/ bilious output > pt made strict NPO, BULMARO drain bilirubin 9.7, LFTs WNL  #Diet, NPO strict  - 5/23: s/p PICC by IR, started on TPN  - J tube study performed 5/21 --> GI consulted for exchange of J for GJ--> as of 5/23 GI is holding off on exchange for now  - aspiration precautions, HOB 30  #GI Prophylaxis    - protonix 40mg QD IV  #Bowel regimen    -holding    -last bowel movement 5/25 PM    /RENAL:   #urine output in critically ill    -chronic indwelling ya > new ya exchanged 5/17  #metabolic acidosis, resolved  #NOLA (baseline creatine 0.6), improving   - nephrology consulted > no RRT at this time  #Hx of solitary kidney      Labs:   BUN/Cr- 32/0.7  -->,  37/0.8  -->    [05-25 @ 17:45]Na  146 // K  3.9 // Mg  2.0 // Phos  2.5  [05-25 @ 06:48]Na  145 // K  3.9 // Mg  2.0 // Phos  2.7      HEME/ONC:   #DVT prophylaxis    -Lovenox    -SCDs  #anemia  - 1pRBC 5/17 to help wean off vasopressors    Labs: Hb/Hct:  7.7/24.7  (05-25 @ 06:48)  -->,  7.5/23.8  (05-25 @ 17:45)  -->                      Plts:  219  -->,  186  -->                 PTT/INR:          ID:  #perforated duodenal ulcer  - Meropenem (5/16 - )  - Nystatin powder for groin rash (5/15 - )  Completed antibiotics:  - zosyn (renal dosing) (5/14 - 5/16)  - caspofungin per primary team (5/14 - 5/20)  - Fluconazole 200mg q24 (end date 5/24)  #COVID positive on admission --- resolved    - completed remdesivir 2 day course    - completed isolation precautions 5/23  #Cultures    - 5/13: blood culture: NGTD    - 5/13: blood culture: NGTD    - 5/13: urine culture: E Coli  - ID following  WBC- 9.87  --->>,  11.33  --->>,  11.28  --->>  Temp trend- 24hrs T(F): 97.9 (05-25 @ 20:00), Max: 97.9 (05-25 @ 20:00)  Current antibiotics-fluconAZOLE IVPB 200 every 24 hours  meropenem  IVPB 1000 every 8 hours      ENDOCRINE:  #Maintain euglycemia    -FSG q6 while NPO    -Glucose goal 140-180. If above 180, will start corrective insulin sliding scale  #hypercalcemia  - HOLDING  home cinacalcet 30mg bid    MSK:  #Activity - increase as tolerated   #hx osteoporosis  - holding home Alendronate 70mg weekly on Thursdays while NPO    SKIN:  #DTI screen  -stage 2 sacral ulcer   -wound c/s placed    - will continue to monitor daily skin changes    PALLIATIVE:  Currently full code, palliative consult       LINES/DRAINS:  PIV, Midline Catheter x2 (5/20-) Ya (replaced 5/17), J tube (5/16), 8 cuffed portex trach    Discontinued lines: R radial arterial line (5/14- 5/22), LIJ CVC (5/14- 5/20)  ADVANCED DIRECTIVES:  Full Code    HCP/Emergency Contact- daughter Cecilia [phone]  INDICATION FOR SICU/SDU:  perforated peptic ulcer; mechanical ventilation/vasopressors     DISPO:  SICU. Case to be discussed with attending Dr. Pedersen   PATRICIA SPRAGUE   [id number]   [birth year]    74yF  ============================================================   DATE OF INITIAL SICU/SDU CONSULT: 05-13-25    INDICATION FOR SICU CONSULT:  perforated duodenal ulcer      SICU COURSE EVENTS :  05-13 - admitted to SICU service  05-14 - s/p OR for exploratory laparotomy, started remdesivir for covid +, echo ordered  5/15 - Echo showing EF 30-35%, cardiology consulted. Pending RTOR. Remains on vasopressors, weaning.  5/16: S/p Exploratory laparotomy, bilroth 2, J tube placement. Episode of wheezing. Changed to meropenem per ID. AFIB slow ventricular response HR 40s-60s, remains on low dose pressors   5/17: Tolerating T piece, trickle feed started via J tube, s/p 1u PRBC to wean off levophed, remains on vasopressin, IVL  5/18: Midodrine 10mg q8 started, weaned off levophed. BULMARO drain bilious output > strict NPO, f/u fluid composition   5/19: CTAP - No evidence of drainable fluid collection.   5/20: Trickle feeds restarted but discontinued 2/2 high gastric residual, NGT kep to suction. Caspofungin discontinued, narrowed to diclucan.  5/21: GI consulted for GJ replacement  5/23: PICC line placed with IR, started TPN in PM, IVL  5/24: diuresis with lasix. albumin 55 250cc x 1 given overnight.  5/25- 25% 50cc albumin x 1 given for SBP 90s, improved to 120s. Diuresis with 20mg lasix x 1, >1.8L response. IPV increased to Q6 hours.      24HOUR EVENTS  5/25  NIGHT  - PM rounds: HR 90s, SBP 120s, tolerating T piece, +BM  - BULMARO serous    DAY  -25% 100cc for SBP 90s  - F/u CXRAY:   - Increased frequency of IPV to q 6  - Lasix 20mg--> 1.8L response  - Clarify w. ID how long on antibiotics --> can stop if not concerned for leak  - Wound consult for DTI ( sacral Stage 2 )    - Keep antibiotics until leak is rule out    [X] A ten-point review of systems was negative except as expressed in note.  [X] History was obtained from patient. If unable to participate in their care, history obtained from review of the chart and collateral sources of information.  ============================================================   74F found to have perforated peptic ulcer now s/p ex lap, distal antrectomy, abthera placement requiring vasopressors. RTOR 5/16 s/p second look laparotomy, Billroth II reconstruction, gastrojejunostomy tube replaced with a jejunal feeding tube, abdomen closed at the end of the case.     NEUROLOGICAL:  #Acute pain  - IV APAP while NPO   - Dilaudid 0.25 PRN    RESPIRATORY:   #Respiratory failure, s/p tracheostomy (11/24)    - now with 8 portex cuffed trach (switched in OR on 5/14)    - tolerating T- piece    - continue duonebs    - lasix for diuresis  #Decreased small bibasilar opacities/atelectasis, right greater than left on CT  - 5/16: s/p deep suctioning by anesthesia w/ copious secretions   #h/o asthma  - atrovent and albuterol   #Increased secretions  - 3% inhaled NS  # COVID+ on admission --- resolved  - s/p Remdesivir  - off contact precautions 5/24  #Activity   - increase as tolerated     CARDS:   #Nonsustained Vtach - self resolved   - cards consult: management of septic shock. Start short acting AC for afib. Start beta blockers once off pressors   #hypotension 2/2 sepsis   - off vasopressors since 5/19  -25% 50cc x 1 for SBP 90s, improved to 120s   - MAP >65  - Holding midodrine 10 mg Q8h while NPO  #hx afib  - transiently was in AFIB slow ventricular response (HR 40s-60s)  - holding home Xarelto 15 QD  #hx HTN  - holding home losartan 100QD  #elevated troponin  - 143 > 84, no longer trending  #TTE 11/24: EF 71%. Mild-mod AS. Trivial pericardial effusion.   - Echo 5/15: EF 30-35%, multiple left ventricular motion wall abnormalities. Possible Takotsubo vs ischemic. Global left ventricular systolic function.     GASTROINTESTINAL/NUTRITION:   #perforated prepyloric gastric ulcer s/p exploratory laparotomy antrectomy and D1 stapled off with temporary abdominal closure and abthera in place  - 5/16: RTOR s/p bilroth 2, J tube placement, RLQ maliha drain by anastomosis extending to duodenal stump  - 5/18: BULMARO drain w/ bilious output > pt made strict NPO, BULMARO drain bilirubin 9.7, LFTs WNL  #Diet, NPO strict  - 5/23: s/p PICC by IR, started on TPN  - J tube study performed 5/21 --> GI consulted for exchange of J for GJ--> as of 5/23 GI is holding off on exchange for now  - aspiration precautions, HOB 30  #GI Prophylaxis    - protonix 40mg QD IV  #Bowel regimen    -holding    -last bowel movement 5/25 PM    /RENAL:   #urine output in critically ill    -chronic indwelling ya > new ya exchanged 5/17  #metabolic acidosis, resolved  #NOLA (baseline creatine 0.6), improving   - nephrology consulted > no RRT at this time  #Hx of solitary kidney      Labs:   BUN/Cr- 32/0.7  -->,  37/0.8  -->    [05-25 @ 17:45]Na  146 // K  3.9 // Mg  2.0 // Phos  2.5  [05-25 @ 06:48]Na  145 // K  3.9 // Mg  2.0 // Phos  2.7      HEME/ONC:   #DVT prophylaxis    -Lovenox    -SCDs  #anemia  - 1pRBC 5/17 to help wean off vasopressors    Labs: Hb/Hct:  7.7/24.7  (05-25 @ 06:48)  -->,  7.5/23.8  (05-25 @ 17:45)  -->                      Plts:  219  -->,  186  -->                 PTT/INR:          ID:  #perforated duodenal ulcer  - Meropenem (5/16 - )  - Nystatin powder for groin rash (5/15 - )  Completed antibiotics:  - zosyn (renal dosing) (5/14 - 5/16)  - caspofungin per primary team (5/14 - 5/20)  - Fluconazole 200mg q24 (end date 5/24)  #COVID positive on admission --- resolved    - completed remdesivir 2 day course    - completed isolation precautions 5/23  #Cultures    - 5/13: blood culture: NGTD    - 5/13: blood culture: NGTD    - 5/13: urine culture: E Coli  - ID following  WBC- 9.87  --->>,  11.33  --->>,  11.28  --->>  Temp trend- 24hrs T(F): 97.9 (05-25 @ 20:00), Max: 97.9 (05-25 @ 20:00)  Current antibiotics-fluconAZOLE IVPB 200 every 24 hours  meropenem  IVPB 1000 every 8 hours      ENDOCRINE:  #Maintain euglycemia    -FSG q6 while NPO    -Glucose goal 140-180. If above 180, will start corrective insulin sliding scale  #hypercalcemia  - HOLDING  home cinacalcet 30mg bid    MSK:  #Activity - increase as tolerated   #hx osteoporosis  - holding home Alendronate 70mg weekly on Thursdays while NPO    SKIN:  #DTI screen  -stage 2 sacral ulcer   -wound c/s placed    - will continue to monitor daily skin changes    PALLIATIVE:  Currently full code, palliative consult       LINES/DRAINS:  PIV, Midline Catheter x2 (5/20-) Ya (replaced 5/17), J tube (5/16), 8 cuffed portex trach    Discontinued lines: R radial arterial line (5/14- 5/22), LIJ CVC (5/14- 5/20)  ADVANCED DIRECTIVES:  Full Code    HCP/Emergency Contact- daughter Cecilia [phone]  INDICATION FOR SICU/SDU:  perforated peptic ulcer; mechanical ventilation/vasopressors     DISPO:  SICU. Case to be discussed with attending Dr. Peters   PATRICIA SPRAGUE   [id number]   [birth year]    74yF  ============================================================   DATE OF INITIAL SICU/SDU CONSULT: 05-13-25    INDICATION FOR SICU CONSULT:  perforated duodenal ulcer      SICU COURSE EVENTS :  05-13 - admitted to SICU service  05-14 - s/p OR for exploratory laparotomy, started remdesivir for covid +, echo ordered  5/15 - Echo showing EF 30-35%, cardiology consulted. Pending RTOR. Remains on vasopressors, weaning.  5/16: S/p Exploratory laparotomy, bilroth 2, J tube placement. Episode of wheezing. Changed to meropenem per ID. AFIB slow ventricular response HR 40s-60s, remains on low dose pressors   5/17: Tolerating T piece, trickle feed started via J tube, s/p 1u PRBC to wean off levophed, remains on vasopressin, IVL  5/18: Midodrine 10mg q8 started, weaned off levophed. BULMARO drain bilious output > strict NPO, f/u fluid composition   5/19: CTAP - No evidence of drainable fluid collection.   5/20: Trickle feeds restarted but discontinued 2/2 high gastric residual, NGT kep to suction. Caspofungin discontinued, narrowed to diclucan.  5/21: GI consulted for GJ replacement  5/23: PICC line placed with IR, started TPN in PM, IVL  5/24: diuresis with lasix. albumin 55 250cc x 1 given overnight.  5/25- 25% 50cc albumin x 1 given for SBP 90s, improved to 120s. Diuresis with 20mg lasix x 1, >1.8L response. IPV increased to Q6 hours.      24HOUR EVENTS  5/25  NIGHT  - PM rounds: HR 90s, SBP 120s, tolerating T piece, +BM  - BULMARO serous    DAY  -25% 100cc for SBP 90s  - F/u CXRAY:   - Increased frequency of IPV to q 6  - Lasix 20mg--> 1.8L response  - Clarify w. ID how long on antibiotics --> can stop if not concerned for leak  - Wound consult for DTI ( sacral Stage 2 )    - Keep antibiotics until leak is rule out    [X] A ten-point review of systems was negative except as expressed in note.  [X] History was obtained from patient. If unable to participate in their care, history obtained from review of the chart and collateral sources of information.  ============================================================   Lorraine   PATRICIA SPRAGUE   [id number]   [birth year]    74yF  ============================================================   DATE OF INITIAL SICU/SDU CONSULT: 05-13-25    INDICATION FOR SICU CONSULT:  perforated duodenal ulcer      SICU COURSE EVENTS :  05-13 - admitted to SICU service  05-14 - s/p OR for exploratory laparotomy, started remdesivir for covid +, echo ordered  5/15 - Echo showing EF 30-35%, cardiology consulted. Pending RTOR. Remains on vasopressors, weaning.  5/16: S/p Exploratory laparotomy, bilroth 2, J tube placement. Episode of wheezing. Changed to meropenem per ID. AFIB slow ventricular response HR 40s-60s, remains on low dose pressors   5/17: Tolerating T piece, trickle feed started via J tube, s/p 1u PRBC to wean off levophed, remains on vasopressin, IVL  5/18: Midodrine 10mg q8 started, weaned off levophed. BULMARO drain bilious output > strict NPO, f/u fluid composition   5/19: CTAP - No evidence of drainable fluid collection.   5/20: Trickle feeds restarted but discontinued 2/2 high gastric residual, NGT kep to suction. Caspofungin discontinued, narrowed to diclucan.  5/21: GI consulted for GJ replacement  5/23: PICC line placed with IR, started TPN in PM, IVL  5/24: diuresis with lasix. albumin 55 250cc x 1 given overnight.  5/25- 25% 50cc albumin x 1 given for SBP 90s, improved to 120s. Diuresis with 20mg lasix x 1, >1.8L response. IPV increased to Q6 hours.      24HOUR EVENTS  5/25  NIGHT  - PM rounds: HR 90s, SBP 120s, tolerating T piece, +BM  - BULMARO serous    DAY  -25% 100cc for SBP 90s  - F/u CXRAY:   - Increased frequency of IPV to q 6  - Lasix 20mg--> 1.8L response  - Clarify w. ID how long on antibiotics --> can stop if not concerned for leak  - Wound consult for DTI ( sacral Stage 2 )    - Keep antibiotics until leak is rule out    [X] A ten-point review of systems was negative except as expressed in note.  [X] History was obtained from patient. If unable to participate in their care, history obtained from review of the chart and collateral sources of information.  ============================================================   Daily     Daily     Diet, NPO (05-20-25 @ 23:37)      CURRENT MEDS:  Neurologic Medications  acetaminophen   IVPB .. 1000 milliGRAM(s) IV Intermittent once  HYDROmorphone  Injectable 0.25 milliGRAM(s) IV Push every 6 hours PRN Severe Pain (7 - 10)    Respiratory Medications  albuterol    90 MICROgram(s) HFA Inhaler 2 Puff(s) Inhalation every 6 hours  ipratropium 17 MICROgram(s) HFA Inhaler 1 Puff(s) Inhalation every 6 hours  sodium chloride 3%  Inhalation 4 milliLiter(s) Inhalation every 6 hours    Cardiovascular Medications    Gastrointestinal Medications  pantoprazole  Injectable 40 milliGRAM(s) IV Push daily  Parenteral Nutrition - Adult 1 Each TPN Continuous <Continuous>  sodium chloride 0.9% lock flush 10 milliLiter(s) IV Push every 1 hour PRN Pre/post blood products, medications, blood draw, and to maintain line patency    Genitourinary Medications    Hematologic/Oncologic Medications  enoxaparin Injectable 40 milliGRAM(s) SubCutaneous every 24 hours    Antimicrobial/Immunologic Medications  fluconAZOLE IVPB 200 milliGRAM(s) IV Intermittent every 24 hours  meropenem  IVPB 1000 milliGRAM(s) IV Intermittent every 8 hours    Endocrine/Metabolic Medications    Topical/Other Medications  chlorhexidine 2% Cloths 1 Application(s) Topical <User Schedule>  nystatin Powder 1 Application(s) Topical two times a day      ICU Vital Signs Last 24 Hrs  T(C): 36.4 (26 May 2025 04:00), Max: 36.6 (25 May 2025 16:00)  T(F): 97.5 (26 May 2025 04:00), Max: 97.9 (25 May 2025 20:00)  HR: 79 (26 May 2025 08:00) (63 - 92)  BP: 116/56 (26 May 2025 08:00) (104/55 - 138/64)  BP(mean): 80 (26 May 2025 08:00) (72 - 92)  ABP: --  ABP(mean): --  RR: 37 (26 May 2025 08:00) (15 - 37)  SpO2: 99% (26 May 2025 08:00) (94% - 100%)    O2 Parameters below as of 26 May 2025 08:00  Patient On (Oxygen Delivery Method): T-piece                    I&O's Summary    25 May 2025 07:01  -  26 May 2025 07:00  --------------------------------------------------------  IN: 1725.6 mL / OUT: 3530 mL / NET: -1804.4 mL    26 May 2025 07:01  -  26 May 2025 08:37  --------------------------------------------------------  IN: 58 mL / OUT: 170 mL / NET: -112 mL      I&O's Detail    25 May 2025 07:01  -  26 May 2025 07:00  --------------------------------------------------------  IN:    Albumin 25%  -  50 mL: 100 mL    Fat Emulsion (Fish Oil &amp; Plant Based) 20% Infusion: 41.6 mL    IV PiggyBack: 100 mL    IV PiggyBack: 50 mL    IV PiggyBack: 100 mL    TPN (Total Parenteral Nutrition): 1334 mL  Total IN: 1725.6 mL    OUT:    Bulb (mL): 120 mL    Indwelling Catheter - Urethral (mL): 3060 mL    Nasogastric/Oral tube (mL): 350 mL  Total OUT: 3530 mL    Total NET: -1804.4 mL      26 May 2025 07:01  -  26 May 2025 08:37  --------------------------------------------------------  IN:    TPN (Total Parenteral Nutrition): 58 mL  Total IN: 58 mL    OUT:    Indwelling Catheter - Urethral (mL): 170 mL  Total OUT: 170 mL    Total NET: -112 mL          PHYSICAL EXAM:  PHYSICAL EXAM:  Neuro: GCS 11T, follows commands, no acute distress  Resp: T- piece in place, equal chest rise b/l, no increased respiratory effort  Cardio: NSR on monitor  Abdomen: Declining abdominal exam.   BULMARO in place with serous output. NGT in place at 55cm  Extremities: soft, mild edema present  : urinary cath in place, draining        CXR:     LABS:  CAPILLARY BLOOD GLUCOSE      POCT Blood Glucose.: 130 mg/dL (26 May 2025 06:12)  POCT Blood Glucose.: 127 mg/dL (25 May 2025 18:17)  POCT Blood Glucose.: 122 mg/dL (25 May 2025 11:51)                          7.6    10.92 )-----------( 199      ( 26 May 2025 06:06 )             24.2       05-26    146  |  111[H]  |  40[H]  ----------------------------<  115[H]  4.2   |  27  |  0.7    Ca    10.5      26 May 2025 06:06  Phos  3.0     05-26  Mg     2.4     05-26              Urinalysis Basic - ( 26 May 2025 06:06 )    Color: x / Appearance: x / SG: x / pH: x  Gluc: 115 mg/dL / Ketone: x  / Bili: x / Urobili: x   Blood: x / Protein: x / Nitrite: x   Leuk Esterase: x / RBC: x / WBC x   Sq Epi: x / Non Sq Epi: x / Bacteria: x

## 2025-05-26 NOTE — PROGRESS NOTE ADULT - SUBJECTIVE AND OBJECTIVE BOX
GENERAL SURGERY PROGRESS NOTE     Patient: PATRICIA SPRAGUE , 74y ([birth year])Female   MRN: [id number]  Location: [address]  Visit: 05-13-25 Inpatient  Date: 05-26-25 @ 00:23        Admitted :05-13-25 (13d)  LOS: 13d    Procedure/Dx/Injuries: Perforated peptic ulcer    Perforated duodenal ulcer    Perforated peptic ulcer    Septic shock    Acute respiratory failure with hypoxia    Encounter for palliative care    Partial antrectomy    US Doppler guided insertion of central venous catheter    Laparotomy, second look, with packing removal    Billroth II operation    Replacement of gastrojejunostomy tube         Events of past 24 hours: NPO, no nausea or vomiting. Plan for GI proecdure tomorrow. Hypotensive systolic 90s, given 25% 50 cc albumin in day w improvement to 120s. Lasix 20 given in day with 1.8 L response output. +BM  >>> <<<>>> <<<>>> <<<>>> <<<>>> <<<>>> <<<>>> <<<>>> <<<>>> <<<>>> <<<    Vitals:   T(F): 97.9 (05-25-25 @ 20:00), Max: 97.9 (05-25-25 @ 20:00)  HR: 88 (05-25-25 @ 23:00)  BP: 104/58 (05-25-25 @ 23:00)  RR: 25 (05-25-25 @ 23:00)  SpO2: 95% (05-25-25 @ 23:00)      PHYSICAL EXAM:  General: NAD  Cardiac: RRR  Respiratory: satting well on t piece  Abdomen: Soft, non-distended, non-tender, J in place  Vascular: Pulses 2+ throughout, extremities well perfused  Skin: Warm/dry, normal color, no jaundice  >>> <<<>>> <<<>>> <<<>>> <<<>>> <<<>>> <<<>>> <<<>>> <<<>>> <<<>>> <<<   Is & Os:   Diet, NPO    Fluids:     05-24-25 @ 07:01  -  05-25-25 @ 07:00  --------------------------------------------------------  IN:    Albumin 5%  - 250 mL: 250 mL    Fat Emulsion (Fish Oil &amp; Plant Based) 20% Infusion: 249.6 mL    IV PiggyBack: 100 mL    TPN (Total Parenteral Nutrition): 1392 mL  Total IN: 1991.6 mL    OUT:    Bulb (mL): 125 mL    Indwelling Catheter - Urethral (mL): 1880 mL    Nasogastric/Oral tube (mL): 0 mL  Total OUT: 2005 mL    Total NET: -13.4 mL        >>> <<<>>> <<<>>> <<<>>> <<<>>> <<<>>> <<<>>> <<<>>> <<<>>> <<<>>> <<<    MEDICATIONS  (STANDING):  acetaminophen   IVPB .. 1000 milliGRAM(s) IV Intermittent once  albuterol    90 MICROgram(s) HFA Inhaler 2 Puff(s) Inhalation every 6 hours  chlorhexidine 2% Cloths 1 Application(s) Topical <User Schedule>  enoxaparin Injectable 40 milliGRAM(s) SubCutaneous every 24 hours  fluconAZOLE IVPB 200 milliGRAM(s) IV Intermittent every 24 hours  ipratropium 17 MICROgram(s) HFA Inhaler 1 Puff(s) Inhalation every 6 hours  meropenem  IVPB 1000 milliGRAM(s) IV Intermittent every 8 hours  nystatin Powder 1 Application(s) Topical two times a day  pantoprazole  Injectable 40 milliGRAM(s) IV Push daily  Parenteral Nutrition - Adult 1 Each (58 mL/Hr) TPN Continuous <Continuous>  sodium chloride 3%  Inhalation 4 milliLiter(s) Inhalation every 6 hours    MEDICATIONS  (PRN):  HYDROmorphone  Injectable 0.25 milliGRAM(s) IV Push every 6 hours PRN Severe Pain (7 - 10)  sodium chloride 0.9% lock flush 10 milliLiter(s) IV Push every 1 hour PRN Pre/post blood products, medications, blood draw, and to maintain line patency      DVT PROPHYLAXIS: enoxaparin Injectable 40 milliGRAM(s) SubCutaneous every 24 hours    GI PROPHYLAXIS: pantoprazole  Injectable 40 milliGRAM(s) IV Push daily    ANTICOAGULATION:   ANTIBIOTICS:  fluconAZOLE IVPB 200 milliGRAM(s)  meropenem  IVPB 1000 milliGRAM(s)      >>> <<<>>> <<<>>> <<<>>> <<<>>> <<<>>> <<<>>> <<<>>> <<<>>> <<<>>> <<<        LAB/STUDIES:  Labs:  CAPILLARY BLOOD GLUCOSE      POCT Blood Glucose.: 127 mg/dL (25 May 2025 18:17)  POCT Blood Glucose.: 122 mg/dL (25 May 2025 11:51)  POCT Blood Glucose.: 135 mg/dL (25 May 2025 05:55)  POCT Blood Glucose.: 153 mg/dL (25 May 2025 00:34)                          7.5    11.28 )-----------( 186      ( 25 May 2025 17:45 )             23.8         05-25    146  |  111[H]  |  37[H]  ----------------------------<  107[H]  3.9   |  27  |  0.8      Calcium: 10.5 mg/dL (05-25-25 @ 17:45)      LFTs:       ABG - ( 19 May 2025 04:13 )  pH: 7.39  /  pCO2: 38    /  pO2: 101   / HCO3: 23    / Base Excess: -1.8  /  SaO2: 98.0              Coags:            Urinalysis Basic - ( 25 May 2025 17:45 )    Color: x / Appearance: x / SG: x / pH: x  Gluc: 107 mg/dL / Ketone: x  / Bili: x / Urobili: x   Blood: x / Protein: x / Nitrite: x   Leuk Esterase: x / RBC: x / WBC x   Sq Epi: x / Non Sq Epi: x / Bacteria: x

## 2025-05-26 NOTE — PROGRESS NOTE ADULT - ASSESSMENT
Assessment:  74y old Female with above PMHx, s/p exlap and second look laparotomy with billroth II and placement of J tube    Plan:  - Plan for GI exchange of J tube to GJ tube  - monitor respiratory status  - TPN  - continue abx   - rest of care per SICU   - Monitor uop    SURGERY SPECTRA: [id number]

## 2025-05-26 NOTE — PROGRESS NOTE ADULT - ATTENDING COMMENTS
Critical Care: [id number]   This patient has a high probability of sudden, clinically significant deterioration, which requires the highest level of physician preparedness to intervene urgently. I managed/supervised life or organ supporting interventions that required frequent physician assessment. I devoted my full attention in the ICU to the direct care of this patient for the period of time indicated below. Time I spent with the family or surrogate(s) is included only if the patient was incapable of providing the necessary information or participating in medical decision making. Time devoted to teaching and to any procedures I billed separately is not included.     PATRICIA SPRAGUE 74y Female admitted to perforated peptic ulcer now s/p ex lap, distal antrectomy, abthera placement requiring vasopressors. RTOR 5/16 s/p second look laparotomy, Billroth II reconstruction, gastrojejunostomy tube replaced with a jejunal feeding tube and abdominal wall closure.  duodenal stump leak, treating with antibiotics.   Patient is examined and evaluated at the bedside with SICU team. Treatment plan discussed with SICU team, nurses and primary team.   Chest X-ray and all relevant studies reviewed during rounds.  Will continue hemodynamic monitoring as per protocol in SICU.    Neuro: PRN pain meds, delirium precautions  CV: continue to monitor  Respiratory: chronic trach, right pleural effusion, chest PT, PRN nebs, diurese, daily CXR  GI: GJ tube placement tomorrow, strict NPO, no anastomotic leak,   TPN for nutrition   Renal: history of solitary kidney, NOLA, now improving  diuresing with goal of net even and or negative   ID: ID following, appreciate recs  continue with IV antibiotics   Heme: acute blood loss anemia, goal Hb > 7  Endocrine: ISS  PV: follow pulse exam  Skin: decub precautions  DVT Prophylaxis:  SCDs  chemical DVT ppx  Stress Gastritis Prophylaxis: PPI  Mobility: PT as able     I have personally and independently provided [ 35 ] minutes of critical care services. This excludes any time spent on separate procedures or teaching. I have reviewed and amended the note as needed. Treatment plan discussed with SICU team, nurses and primary team at the time of the multidisciplinary rounds. The above note is NOT written at the time of rounds and will reflect all changes throughout management of the patient for the day note is written for.    Madai Peters MD  Trauma/ACS/SICU Attending

## 2025-05-26 NOTE — PROGRESS NOTE ADULT - ASSESSMENT
81F PMHx HTN, a-fib (on xarelto), solitary kidney, DM, GERD, prior open cholecystectomy c/b suture granulomas s/p 2019 abdominal wall debridements by Dr. Banda, respiratory distress with prolonged ventilation during 11/2024 admission, s/p 11/19 tracheostomy and gastrostomy and feeding jejunostomy tube by Dr. Champion, ya catheter dependence who presented to the ED from NH on 5/13 with 4 days progressively worsening abdominal pain. Found to have perforated peptic ulcer. S/p EX LAP AND ANTRECTOMY, ABD LEFT OPEN W/ ABTHERA and then 5/17 S/P 2ND LOOK LAP, BILROTH 2, PLACEMENT OF J tube. GI consulted for replacement of J tube with GJ tube.     #J tube replacement   #inflammatory changes noted on CTAP 5/19 possible anastomotic leak  - KUB noted to have J tube coiled in stomach.   - CTAP on 5/19 showing  Inflammatory changes inferior to the left lobe of the liver are nonspecific and may reflect postoperative in etiology as well as inflammatory or infectious etiology. Biliary leak cannot be excluded either. Can consider HIDA scan (biliary leak study)   - LFTs WNL   - no leukocytosis, Afebrile   - HP drain in place, bilious output    Rec  Will tentatively plan for replacement of J tube with GJ tube tomorrow  NPO after midnight hold tube feeds  Monitor drain output   Rest of care per surgery

## 2025-05-26 NOTE — PROGRESS NOTE ADULT - SUBJECTIVE AND OBJECTIVE BOX
Gastroenterology progress note:     Patient is a 74y old  Female who presents with a chief complaint of Septic (16 May 2025 12:06)       Admitted on: 05-13-25    We are following the patient for:   J tube replacement     Interval History:    No acute events overnight.   ON TPN       PAST MEDICAL & SURGICAL HISTORY:  HTN (hypertension)      Diabetes mellitus      Obesity      Gastroesophageal reflux disease      Active asthma      Generalized OA      Afib      H/O hernia repair  2011 and revised in 2013      History of cholecystectomy      Status post debridement      H/O tracheostomy      S/P gastrostomy      S/P jejunostomy          MEDICATIONS  (STANDING):  acetaminophen   IVPB .. 1000 milliGRAM(s) IV Intermittent once  albumin human 25% IVPB 50 milliLiter(s) IV Intermittent once  albuterol/ipratropium for Nebulization 3 milliLiter(s) Nebulizer every 6 hours  chlorhexidine 2% Cloths 1 Application(s) Topical <User Schedule>  enoxaparin Injectable 40 milliGRAM(s) SubCutaneous every 24 hours  fluconAZOLE IVPB 200 milliGRAM(s) IV Intermittent every 24 hours  furosemide   Injectable 20 milliGRAM(s) IV Push once  lipid, fat emulsion (Fish Oil and Plant Based) 20% Infusion 0.5045 Gm/kG/Day (20.8 mL/Hr) IV Continuous <Continuous>  meropenem  IVPB 1000 milliGRAM(s) IV Intermittent every 8 hours  nystatin Powder 1 Application(s) Topical two times a day  pantoprazole  Injectable 40 milliGRAM(s) IV Push daily  Parenteral Nutrition - Adult 1 Each (58 mL/Hr) TPN Continuous <Continuous>  Parenteral Nutrition - Adult 1 Each (58 mL/Hr) TPN Continuous <Continuous>  sodium chloride 3%  Inhalation 4 milliLiter(s) Inhalation every 6 hours    MEDICATIONS  (PRN):  HYDROmorphone  Injectable 0.25 milliGRAM(s) IV Push every 6 hours PRN Severe Pain (7 - 10)  sodium chloride 0.9% lock flush 10 milliLiter(s) IV Push every 1 hour PRN Pre/post blood products, medications, blood draw, and to maintain line patency      Allergies  No Known Allergies      Review of Systems:   unable to obtain    Physical Examination:  T(C): 36.4 (05-26-25 @ 08:00), Max: 36.6 (05-25-25 @ 16:00)  HR: 69 (05-26-25 @ 10:00) (63 - 92)  BP: 125/56 (05-26-25 @ 10:00) (104/55 - 138/64)  RR: 29 (05-26-25 @ 10:00) (15 - 37)  SpO2: 100% (05-26-25 @ 10:00) (94% - 100%)  Weight (kg): 83.1 (05-26-25 @ 06:00)    05-25-25 @ 07:01  -  05-26-25 @ 07:00  --------------------------------------------------------  IN: 1725.6 mL / OUT: 3530 mL / NET: -1804.4 mL    05-26-25 @ 07:01  -  05-26-25 @ 11:45  --------------------------------------------------------  IN: 116 mL / OUT: 365 mL / NET: -249 mL        GENERAL: ill appearing   HEAD:  Atraumatic, Normocephalic  EYES: conjunctiva and sclera clear  NECK: Supple, no JVD or thyromegaly  CHEST/LUNG: Clear to auscultation bilaterally; No wheeze, rhonchi, or rales  HEART: Regular rate and rhythm; normal S1, S2, No murmurs.  ABDOMEN: Soft, nontender, nondistended; Drain in place, surgical changes noted        Data:                        7.6    10.92 )-----------( 199      ( 26 May 2025 06:06 )             24.2     Hgb trend:  7.6  05-26-25 @ 06:06  7.5  05-25-25 @ 17:45  7.7  05-25-25 @ 06:48  8.7  05-24-25 @ 06:08      05-24-25 @ 07:01  -  05-25-25 @ 07:00  --------------------------------------------------------  IN: 250 mL    05-25-25 @ 07:01  -  05-26-25 @ 07:00  --------------------------------------------------------  IN: 100 mL      05-26    146  |  111[H]  |  40[H]  ----------------------------<  115[H]  4.2   |  27  |  0.7    Ca    10.5      26 May 2025 06:06  Phos  3.0     05-26  Mg     2.4     05-26      Liver panel trend:  TBili 0.4   /   AST 10   /   ALT 9   /   AlkP 91   /   Tptn 4.1   /   Alb 2.1    /   DBili 0.2      05-21  TBili 0.4   /   AST 11   /   ALT 9   /   AlkP 82   /   Tptn 4.2   /   Alb 2.4    /   DBili 0.2      05-18  TBili 0.3   /   AST 11   /   ALT 9   /   AlkP 75   /   Tptn 4.0   /   Alb 2.3    /   DBili 0.2      05-16             Radiology:

## 2025-05-27 LAB
ALBUMIN SERPL ELPH-MCNC: 2.7 G/DL — LOW (ref 3.5–5.2)
ALP SERPL-CCNC: 112 U/L — SIGNIFICANT CHANGE UP (ref 30–115)
ALT FLD-CCNC: 8 U/L — SIGNIFICANT CHANGE UP (ref 0–41)
ANION GAP SERPL CALC-SCNC: 10 MMOL/L — SIGNIFICANT CHANGE UP (ref 7–14)
ANION GAP SERPL CALC-SCNC: 8 MMOL/L — SIGNIFICANT CHANGE UP (ref 7–14)
APTT BLD: 33.7 SEC — SIGNIFICANT CHANGE UP (ref 27–39.2)
APTT BLD: 34.1 SEC — SIGNIFICANT CHANGE UP (ref 27–39.2)
AST SERPL-CCNC: 17 U/L — SIGNIFICANT CHANGE UP (ref 0–41)
BASOPHILS # BLD AUTO: 0.01 K/UL — SIGNIFICANT CHANGE UP (ref 0–0.2)
BASOPHILS NFR BLD AUTO: 0.1 % — SIGNIFICANT CHANGE UP (ref 0–1)
BILIRUB DIRECT SERPL-MCNC: 0.3 MG/DL — SIGNIFICANT CHANGE UP (ref 0–0.3)
BILIRUB INDIRECT FLD-MCNC: 0.5 MG/DL — SIGNIFICANT CHANGE UP (ref 0.2–1.2)
BILIRUB SERPL-MCNC: 0.8 MG/DL — SIGNIFICANT CHANGE UP (ref 0.2–1.2)
BUN SERPL-MCNC: 42 MG/DL — HIGH (ref 10–20)
BUN SERPL-MCNC: 44 MG/DL — HIGH (ref 10–20)
CALCIUM SERPL-MCNC: 10.1 MG/DL — SIGNIFICANT CHANGE UP (ref 8.4–10.5)
CALCIUM SERPL-MCNC: 9.8 MG/DL — SIGNIFICANT CHANGE UP (ref 8.4–10.5)
CHLORIDE SERPL-SCNC: 110 MMOL/L — SIGNIFICANT CHANGE UP (ref 98–110)
CHLORIDE SERPL-SCNC: 112 MMOL/L — HIGH (ref 98–110)
CO2 SERPL-SCNC: 24 MMOL/L — SIGNIFICANT CHANGE UP (ref 17–32)
CO2 SERPL-SCNC: 25 MMOL/L — SIGNIFICANT CHANGE UP (ref 17–32)
CREAT SERPL-MCNC: 0.6 MG/DL — LOW (ref 0.7–1.5)
CREAT SERPL-MCNC: 0.7 MG/DL — SIGNIFICANT CHANGE UP (ref 0.7–1.5)
EGFR: 92 ML/MIN/1.73M2 — SIGNIFICANT CHANGE UP
EGFR: 92 ML/MIN/1.73M2 — SIGNIFICANT CHANGE UP
EGFR: 95 ML/MIN/1.73M2 — SIGNIFICANT CHANGE UP
EGFR: 95 ML/MIN/1.73M2 — SIGNIFICANT CHANGE UP
EOSINOPHIL # BLD AUTO: 0.33 K/UL — SIGNIFICANT CHANGE UP (ref 0–0.7)
EOSINOPHIL NFR BLD AUTO: 3.9 % — SIGNIFICANT CHANGE UP (ref 0–8)
GLUCOSE SERPL-MCNC: 112 MG/DL — HIGH (ref 70–99)
GLUCOSE SERPL-MCNC: 117 MG/DL — HIGH (ref 70–99)
HCT VFR BLD CALC: 23 % — LOW (ref 37–47)
HCT VFR BLD CALC: 23.4 % — LOW (ref 37–47)
HGB BLD-MCNC: 7.3 G/DL — LOW (ref 12–16)
HGB BLD-MCNC: 7.4 G/DL — LOW (ref 12–16)
IMM GRANULOCYTES NFR BLD AUTO: 0.8 % — HIGH (ref 0.1–0.3)
INR BLD: 1.01 RATIO — SIGNIFICANT CHANGE UP (ref 0.65–1.3)
INR BLD: 1.03 RATIO — SIGNIFICANT CHANGE UP (ref 0.65–1.3)
LYMPHOCYTES # BLD AUTO: 1.39 K/UL — SIGNIFICANT CHANGE UP (ref 1.2–3.4)
LYMPHOCYTES # BLD AUTO: 16.5 % — LOW (ref 20.5–51.1)
MAGNESIUM SERPL-MCNC: 1.9 MG/DL — SIGNIFICANT CHANGE UP (ref 1.8–2.4)
MAGNESIUM SERPL-MCNC: 2 MG/DL — SIGNIFICANT CHANGE UP (ref 1.8–2.4)
MCHC RBC-ENTMCNC: 27.6 PG — SIGNIFICANT CHANGE UP (ref 27–31)
MCHC RBC-ENTMCNC: 27.9 PG — SIGNIFICANT CHANGE UP (ref 27–31)
MCHC RBC-ENTMCNC: 31.6 G/DL — LOW (ref 32–37)
MCHC RBC-ENTMCNC: 31.7 G/DL — LOW (ref 32–37)
MCV RBC AUTO: 87.3 FL — SIGNIFICANT CHANGE UP (ref 81–99)
MCV RBC AUTO: 87.8 FL — SIGNIFICANT CHANGE UP (ref 81–99)
MONOCYTES # BLD AUTO: 0.59 K/UL — SIGNIFICANT CHANGE UP (ref 0.1–0.6)
MONOCYTES NFR BLD AUTO: 7 % — SIGNIFICANT CHANGE UP (ref 1.7–9.3)
NEUTROPHILS # BLD AUTO: 6.03 K/UL — SIGNIFICANT CHANGE UP (ref 1.4–6.5)
NEUTROPHILS NFR BLD AUTO: 71.7 % — SIGNIFICANT CHANGE UP (ref 42.2–75.2)
NRBC BLD AUTO-RTO: 0 /100 WBCS — SIGNIFICANT CHANGE UP (ref 0–0)
NRBC BLD AUTO-RTO: 0 /100 WBCS — SIGNIFICANT CHANGE UP (ref 0–0)
PHOSPHATE SERPL-MCNC: 3 MG/DL — SIGNIFICANT CHANGE UP (ref 2.1–4.9)
PHOSPHATE SERPL-MCNC: 3.2 MG/DL — SIGNIFICANT CHANGE UP (ref 2.1–4.9)
PLATELET # BLD AUTO: 183 K/UL — SIGNIFICANT CHANGE UP (ref 130–400)
PLATELET # BLD AUTO: 193 K/UL — SIGNIFICANT CHANGE UP (ref 130–400)
PMV BLD: 10.8 FL — HIGH (ref 7.4–10.4)
PMV BLD: 11.4 FL — HIGH (ref 7.4–10.4)
POTASSIUM SERPL-MCNC: 4.5 MMOL/L — SIGNIFICANT CHANGE UP (ref 3.5–5)
POTASSIUM SERPL-MCNC: 4.5 MMOL/L — SIGNIFICANT CHANGE UP (ref 3.5–5)
POTASSIUM SERPL-SCNC: 4.5 MMOL/L — SIGNIFICANT CHANGE UP (ref 3.5–5)
POTASSIUM SERPL-SCNC: 4.5 MMOL/L — SIGNIFICANT CHANGE UP (ref 3.5–5)
PROT SERPL-MCNC: 4.5 G/DL — LOW (ref 6–8)
PROTHROM AB SERPL-ACNC: 11.9 SEC — SIGNIFICANT CHANGE UP (ref 9.95–12.87)
PROTHROM AB SERPL-ACNC: 12.2 SEC — SIGNIFICANT CHANGE UP (ref 9.95–12.87)
RBC # BLD: 2.62 M/UL — LOW (ref 4.2–5.4)
RBC # BLD: 2.68 M/UL — LOW (ref 4.2–5.4)
RBC # FLD: 16.4 % — HIGH (ref 11.5–14.5)
RBC # FLD: 16.4 % — HIGH (ref 11.5–14.5)
SODIUM SERPL-SCNC: 143 MMOL/L — SIGNIFICANT CHANGE UP (ref 135–146)
SODIUM SERPL-SCNC: 146 MMOL/L — SIGNIFICANT CHANGE UP (ref 135–146)
WBC # BLD: 8.42 K/UL — SIGNIFICANT CHANGE UP (ref 4.8–10.8)
WBC # BLD: 9.26 K/UL — SIGNIFICANT CHANGE UP (ref 4.8–10.8)
WBC # FLD AUTO: 8.42 K/UL — SIGNIFICANT CHANGE UP (ref 4.8–10.8)
WBC # FLD AUTO: 9.26 K/UL — SIGNIFICANT CHANGE UP (ref 4.8–10.8)

## 2025-05-27 PROCEDURE — 99233 SBSQ HOSP IP/OBS HIGH 50: CPT

## 2025-05-27 PROCEDURE — 99291 CRITICAL CARE FIRST HOUR: CPT | Mod: 24,25

## 2025-05-27 PROCEDURE — 71045 X-RAY EXAM CHEST 1 VIEW: CPT | Mod: 26

## 2025-05-27 RX ORDER — FUROSEMIDE 10 MG/ML
20 INJECTION INTRAMUSCULAR; INTRAVENOUS ONCE
Refills: 0 | Status: COMPLETED | OUTPATIENT
Start: 2025-05-27 | End: 2025-05-27

## 2025-05-27 RX ORDER — SODIUM/POT/MAG/CALC/CHLOR/ACET 35-20-5MEQ
1 VIAL (ML) INTRAVENOUS
Refills: 0 | Status: DISCONTINUED | OUTPATIENT
Start: 2025-05-27 | End: 2025-05-27

## 2025-05-27 RX ADMIN — Medication 40 MILLIGRAM(S): at 12:05

## 2025-05-27 RX ADMIN — IPRATROPIUM BROMIDE AND ALBUTEROL SULFATE 3 MILLILITER(S): .5; 2.5 SOLUTION RESPIRATORY (INHALATION) at 14:47

## 2025-05-27 RX ADMIN — ENOXAPARIN SODIUM 40 MILLIGRAM(S): 100 INJECTION SUBCUTANEOUS at 10:00

## 2025-05-27 RX ADMIN — NYSTATIN 1 APPLICATION(S): 100000 CREAM TOPICAL at 18:44

## 2025-05-27 RX ADMIN — IPRATROPIUM BROMIDE AND ALBUTEROL SULFATE 3 MILLILITER(S): .5; 2.5 SOLUTION RESPIRATORY (INHALATION) at 02:29

## 2025-05-27 RX ADMIN — NYSTATIN 1 APPLICATION(S): 100000 CREAM TOPICAL at 05:31

## 2025-05-27 RX ADMIN — FUROSEMIDE 20 MILLIGRAM(S): 10 INJECTION INTRAMUSCULAR; INTRAVENOUS at 12:05

## 2025-05-27 RX ADMIN — Medication 4 MILLILITER(S): at 02:30

## 2025-05-27 RX ADMIN — Medication 4 MILLILITER(S): at 14:47

## 2025-05-27 RX ADMIN — Medication 100 MILLIGRAM(S): at 12:06

## 2025-05-27 RX ADMIN — MEROPENEM 100 MILLIGRAM(S): 1 INJECTION INTRAVENOUS at 05:30

## 2025-05-27 RX ADMIN — Medication 4 MILLILITER(S): at 08:08

## 2025-05-27 RX ADMIN — Medication 58 EACH: at 20:51

## 2025-05-27 RX ADMIN — IPRATROPIUM BROMIDE AND ALBUTEROL SULFATE 3 MILLILITER(S): .5; 2.5 SOLUTION RESPIRATORY (INHALATION) at 08:09

## 2025-05-27 RX ADMIN — MEROPENEM 100 MILLIGRAM(S): 1 INJECTION INTRAVENOUS at 13:14

## 2025-05-27 RX ADMIN — Medication 1 APPLICATION(S): at 05:30

## 2025-05-27 NOTE — ADVANCED PRACTICE NURSE CONSULT - RECOMMEDATIONS
Cleanse all wounds with soap and water, pat dry.  Apply Triad cream twice daily PRN for soiling.  Skin and incontinence care.  Pressure Injury Prevention.  Assess wound daily and inform primary provider of any changes.   Case discussed with primary RN.  Wound/ ostomy specialist to f/u as needed.   Other recommendations per Primary Team.

## 2025-05-27 NOTE — PROGRESS NOTE ADULT - ASSESSMENT
ASSESSMENT  81F PMHx HTN, a-fib (on xarelto), solitary kidney, DM, GERD, prior open cholecystectomy c/b suture granulomas s/p 2019 abdominal wall debridements by Dr. Banda, respiratory distress with prolonged ventilation during 11/2024 admission, s/p 11/19 tracheostomy and gastrostomy and feeding jejunostomy tube by Dr. Champion, ya catheter dependence who presented to the ED from NH on 5/13 with 4 days progressively worsening abdominal pain. Surgery consulted for imaging concerning for duodenal perforation. S/p OR, ID consulted for antimicrobial management       IMPRESSION  #Concern for anastomotic leak on CT, per Surgery- no concern   < from: CT Abdomen and Pelvis w/ Oral Cont and w/ IV Cont (05.19.25 @ 18:30) >  Inflammatory changes inferior to the left lobe of the liver are nonspecific and may reflect postoperative in etiology as well as   inflammatory or infectious etiology. Biliary leak cannot be excluded either. If clinically warranted, nuclear medicine HIDA scan (biliary leak   study) may be of benefit.  Small perihepatic and perisplenic ascites as well as anasa Post distal antrectomy and gastrojejunostomy without evidence of   drainable fluid collection or contrast extravasation.  Percutaneous drainage catheter terminating in the region of the right upper quadrant inferior to the liver with nearby inflammatory changes. No   evidence of drainable fluid collection  Bilateral small pleural effusions with compressive atelectasis. Superimposed infectious process is not excluded.  #Perforated Duodenal Ulcer, septic shock requiring pressors     Afebrile; Admission WBC 18    5/13 BCX NGTD     5/13 UCX   >100,000 CFU/ml Escherichia coli ESBL S zosyn  Procedure	PROCEDURES:  Partial antrectomy 14-May-2025 03:37:14  Carlos Cheatham  Operative Findings	prepyloric ulcer 4 cm perforation anterior stomach s/p antrectomy, D1 stapled off due to friable tissue to ensure healthy tissue for staple line. Temporary abdominal closure due to HD instability.  < from: CT Abdomen and Pelvis w/ IV Cont (05.13.25 @ 18:16) >  *1. Since November 8, 2024, new moderate mural thickening and inflammation involving the proximal proximal duodenal wall, with focal   defect/outpouching along lateral duodenum, compatible with perforated duodenal ulcer; no focal drainable fluid collection.  2. Additional chronic/incidental findings, as detailed above.  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 D/C antimicrobials and monitor   - Trend WBC, fever curve    If any questions, please send a message or call on [employer] Teams  Please continue to update ID with any pertinent new laboratory, radiographic findings, or change in clinical status   ASSESSMENT  81F PMHx HTN, a-fib (on xarelto), solitary kidney, DM, GERD, prior open cholecystectomy c/b suture granulomas s/p 2019 abdominal wall debridements by Dr. Banda, respiratory distress with prolonged ventilation during 11/2024 admission, s/p 11/19 tracheostomy and gastrostomy and feeding jejunostomy tube by Dr. Champion, ya catheter dependence who presented to the ED from NH on 5/13 with 4 days progressively worsening abdominal pain. Surgery consulted for imaging concerning for duodenal perforation. S/p OR, ID consulted for antimicrobial management       IMPRESSION  #Concern for anastomotic leak on CT, per Surgery- no concern   < from: CT Abdomen and Pelvis w/ Oral Cont and w/ IV Cont (05.19.25 @ 18:30) >  Inflammatory changes inferior to the left lobe of the liver are nonspecific and may reflect postoperative in etiology as well as   inflammatory or infectious etiology. Biliary leak cannot be excluded either. If clinically warranted, nuclear medicine HIDA scan (biliary leak   study) may be of benefit.  Small perihepatic and perisplenic ascites as well as anasa Post distal antrectomy and gastrojejunostomy without evidence of   drainable fluid collection or contrast extravasation.  Percutaneous drainage catheter terminating in the region of the right upper quadrant inferior to the liver with nearby inflammatory changes. No   evidence of drainable fluid collection  Bilateral small pleural effusions with compressive atelectasis. Superimposed infectious process is not excluded.  #Perforated Duodenal Ulcer, septic shock requiring pressors     Afebrile; Admission WBC 18    5/13 BCX NGTD     5/13 UCX   >100,000 CFU/ml Escherichia coli ESBL S zosyn  Procedure	PROCEDURES:  Partial antrectomy 14-May-2025 03:37:14  Carlos Cheatham  Operative Findings	prepyloric ulcer 4 cm perforation anterior stomach s/p antrectomy, D1 stapled off due to friable tissue to ensure healthy tissue for staple line. Temporary abdominal closure due to HD instability.  < from: CT Abdomen and Pelvis w/ IV Cont (05.13.25 @ 18:16) >  *1. Since November 8, 2024, new moderate mural thickening and inflammation involving the proximal proximal duodenal wall, with focal   defect/outpouching along lateral duodenum, compatible with perforated duodenal ulcer; no focal drainable fluid collection.  2. Additional chronic/incidental findings, as detailed above.  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 D/C antimicrobials and monitor   - Trend WBC, fever curve  - GI intervention today to replace J to GJ tube.    If any questions, please send a message or call on [employer] Teams  Please continue to update ID with any pertinent new laboratory, radiographic findings, or change in clinical status

## 2025-05-27 NOTE — PROGRESS NOTE ADULT - SUBJECTIVE AND OBJECTIVE BOX
PATRICIA SPRAGUE   [id number]   [birth year]    74yF  ============================================================   DATE OF INITIAL SICU/SDU CONSULT: 05-13-25    INDICATION FOR SICU CONSULT:  perforated duodenal ulcer      SICU COURSE EVENTS :  05-13 - admitted to SICU service  05-14 - s/p OR for exploratory laparotomy, started remdesivir for covid +, echo ordered  5/15 - Echo showing EF 30-35%, cardiology consulted. Pending RTOR. Remains on vasopressors, weaning.  5/16: S/p Exploratory laparotomy, bilroth 2, J tube placement. Episode of wheezing. Changed to meropenem per ID. AFIB slow ventricular response HR 40s-60s, remains on low dose pressors   5/17: Tolerating T piece, trickle feed started via J tube, s/p 1u PRBC to wean off levophed, remains on vasopressin, IVL  5/18: Midodrine 10mg q8 started, weaned off levophed. BULMARO drain bilious output > strict NPO, f/u fluid composition   5/19: CTAP - No evidence of drainable fluid collection.   5/20: Trickle feeds restarted but discontinued 2/2 high gastric residual, NGT kep to suction. Caspofungin discontinued, narrowed to diclucan.  5/21: GI consulted for GJ replacement  5/23: PICC line placed with IR, started TPN in PM, IVL  5/24: diuresis with lasix. albumin 55 250cc x 1 given overnight.  5/25- 25% 50cc albumin x 1 given for SBP 90s, improved to 120s. Diuresis with 20mg lasix x 1, >1.8L response. IPV increased to Q6 hours.  5/26: Diuresis with 20mg lasix, given 25% 50cc, >1L response.        24HOUR EVENTS  5/26  NIGHT  -  PM rounds: tolerating T piece, HR 60s, SBP 120s  - planning for GI tomorrow for feeding tube placement (GJ)    DAY  - Palliative re eval tomorrow  - Lasix 20, albumin 25% 50cc, >1.1 L OP  - Keep Bibi today, tomorrow d/w ID to see if needed  -PEG-J tube adjustment tomorrow per GI    [X] A ten-point review of systems was negative except as expressed in note.  [X] History was obtained from patient. If unable to participate in their care, history obtained from review of the chart and collateral sources of information.  ============================================================   · Assessment	  74F found to have perforated peptic ulcer now s/p ex lap, distal antrectomy, abthera placement requiring vasopressors. RTOR 5/16 s/p second look laparotomy, Billroth II reconstruction, gastrojejunostomy tube replaced with a jejunal feeding tube, abdomen closed at the end of the case.     NEUROLOGICAL:  #Acute pain  - IV APAP while NPO   - Dilaudid 0.25 PRN    RESPIRATORY:   #Respiratory failure, s/p tracheostomy (11/24)    - now with 8 portex cuffed trach (switched in OR on 5/14)    - tolerating T- piece    - continue duonebs    - lasix for diuresis given daily, last dose 5/26   #Decreased small bibasilar opacities/atelectasis, right greater than left on CT  - 5/16: s/p deep suctioning by anesthesia w/ copious secretions   #h/o asthma  - atrovent and albuterol   #Increased secretions  - 3% inhaled NS  # COVID+ on admission --- resolved  - s/p Remdesivir  - off contact precautions 5/24  #Activity   - increase as tolerated     CARDS:   #Nonsustained Vtach - self resolved   - cards consult: management of septic shock. Start short acting AC for afib. Start beta blockers once off pressors   #hypotension 2/2 sepsis   - off vasopressors since 5/19  -25% 50cc x 1 for SBP 90s, improved to 120s   - MAP >65  - Holding midodrine 10 mg Q8h while NPO  #hx afib  - transiently was in AFIB slow ventricular response (HR 40s-60s)  - holding home Xarelto 15 QD  #hx HTN  - holding home losartan 100QD  #elevated troponin  - 143 > 84, no longer trending  #TTE 11/24: EF 71%. Mild-mod AS. Trivial pericardial effusion.   - Echo 5/15: EF 30-35%, multiple left ventricular motion wall abnormalities. Possible Takotsubo vs ischemic. Global left ventricular systolic function.     GASTROINTESTINAL/NUTRITION:   #perforated prepyloric gastric ulcer s/p exploratory laparotomy antrectomy and D1 stapled off with temporary abdominal closure and abthera in place  - 5/16: RTOR s/p bilroth 2, J tube placement, RLQ maliha drain by anastomosis extending to duodenal stump  - 5/18: BULMARO drain w/ bilious output > pt made strict NPO, BULMARO drain bilirubin 9.7, LFTs WNL  #Diet, NPO strict  - 5/23: s/p PICC by IR, started on TPN  - J tube study performed 5/21 --> GI consulted for exchange of J for GJ  -PEG-J tube adjustment tomorrow by GI   - aspiration precautions, HOB 30  #GI Prophylaxis    - protonix 40mg QD IV  #Bowel regimen    -holding    -last bowel movement 5/25 PM    /RENAL:   #urine output in critically ill    -chronic indwelling ya > new ya exchanged 5/17  #metabolic acidosis, resolved  #NOLA (baseline creatine 0.6), improving   - nephrology consulted > no RRT at this time  #Hx of solitary kidney    Labs:   BUN/Cr- 40/0.7  -->,  41/0.7  -->    [05-26 @ 17:39]Na  148 // K  4.4 // Mg  2.0 // Phos  3.0  [05-26 @ 06:06]Na  146 // K  4.2 // Mg  2.4 // Phos  3.0      HEME/ONC:   #DVT prophylaxis    -Lovenox    -SCDs  #anemia  - 1pRBC 5/17 to help wean off vasopressors    Labs: Hb/Hct:  7.5/23.8  (05-25 @ 17:45)  -->,  7.6/24.2  (05-26 @ 06:06)  -->                      Plts:  186  -->,  199  -->                 PTT/INR:          ID:  #perforated duodenal ulcer  - Meropenem (5/16 - )  - Nystatin powder for groin rash (5/15 - )  Completed antibiotics:  - zosyn (renal dosing) (5/14 - 5/16)  - caspofungin per primary team (5/14 - 5/20)  - Fluconazole 200mg q24 (end date 5/24)  #COVID positive on admission --- resolved    - completed remdesivir 2 day course    - completed isolation precautions 5/23  #Cultures    - 5/13: blood culture: NGTD    - 5/13: blood culture: NGTD    - 5/13: urine culture: E Coli  - ID following  WBC- 11.33  --->>,  11.28  --->>,  10.92  --->>  Temp trend- 24hrs T(F): 97 (05-26 @ 20:00), Max: 97.6 (05-26 @ 08:00)  Current antibiotics-fluconAZOLE IVPB 200 every 24 hours  meropenem  IVPB 1000 every 8 hours      ENDOCRINE:  #Maintain euglycemia    -FSG q6 while NPO    -Glucose goal 140-180. If above 180, will start corrective insulin sliding scale  #hypercalcemia  - HOLDING  home cinacalcet 30mg bid    MSK:  #Activity - increase as tolerated   #hx osteoporosis  - holding home Alendronate 70mg weekly on Thursdays while NPO    SKIN:  #DTI screen  -stage 2 sacral ulcer   -wound c/s placed    - will continue to monitor daily skin changes    PALLIATIVE:  Currently full code, palliative consult; readdress goals 5/27      LINES/DRAINS:  PIV, Midline Catheter x2 (5/20-) Ya (replaced 5/17), J tube (5/16), 8 cuffed portex trach    Discontinued lines: R radial arterial line (5/14- 5/22), LIJ CVC (5/14- 5/20)  ADVANCED DIRECTIVES:  Full Code    HCP/Emergency Contact- daughter Cecilia [phone]  INDICATION FOR SICU/SDU:  perforated peptic ulcer; mechanical ventilation/vasopressors     DISPO:  SICU. Case discussed with attending Dr. Peters       PATRICIA SPRAGUE   [id number]   [birth year]    74yF  ============================================================   DATE OF INITIAL SICU/SDU CONSULT: 25    INDICATION FOR SICU CONSULT:  perforated duodenal ulcer      SICU COURSE EVENTS :   - admitted to SICU service   - s/p OR for exploratory laparotomy, started remdesivir for covid +, echo ordered  5/15 - Echo showing EF 30-35%, cardiology consulted. Pending RTOR. Remains on vasopressors, weaning.  : S/p Exploratory laparotomy, bilroth 2, J tube placement. Episode of wheezing. Changed to meropenem per ID. AFIB slow ventricular response HR 40s-60s, remains on low dose pressors   : Tolerating T piece, trickle feed started via J tube, s/p 1u PRBC to wean off levophed, remains on vasopressin, IVL  : Midodrine 10mg q8 started, weaned off levophed. BULMARO drain bilious output > strict NPO, f/u fluid composition   : CTAP - No evidence of drainable fluid collection.   : Trickle feeds restarted but discontinued 2/2 high gastric residual, NGT kep to suction. Caspofungin discontinued, narrowed to diclucan.  : GI consulted for GJ replacement  : PICC line placed with IR, started TPN in PM, IVL  : diuresis with lasix. albumin 55 250cc x 1 given overnight.  - 25% 50cc albumin x 1 given for SBP 90s, improved to 120s. Diuresis with 20mg lasix x 1, >1.8L response. IPV increased to Q6 hours.  : Diuresis with 20mg lasix, given 25% 50cc, >1L response.        24HOUR EVENTS    NIGHT  -  PM rounds: tolerating T piece, HR 60s, SBP 120s  - planning for GI tomorrow for feeding tube placement (GJ)    DAY  - Palliative re eval tomorrow  - Lasix 20, albumin 25% 50cc, >1.1 L OP  - Keep Bibi today, tomorrow d/w ID to see if needed  -PEG-J tube adjustment tomorrow per GI    [X] A ten-point review of systems was negative except as expressed in note.  [X] History was obtained from patient. If unable to participate in their care, history obtained from review of the chart and collateral sources of information.  ============================================================     Daily     Daily Weight in k (27 May 2025 05:00)    Diet, NPO (25 @ 23:37)      CURRENT MEDS:  Neurologic Medications  acetaminophen   IVPB .. 1000 milliGRAM(s) IV Intermittent once    Respiratory Medications  albuterol/ipratropium for Nebulization 3 milliLiter(s) Nebulizer every 6 hours  sodium chloride 3%  Inhalation 4 milliLiter(s) Inhalation every 6 hours    Cardiovascular Medications    Gastrointestinal Medications  lipid, fat emulsion (Fish Oil and Plant Based) 20% Infusion 0.5045 Gm/kG/Day IV Continuous <Continuous>  pantoprazole  Injectable 40 milliGRAM(s) IV Push daily  Parenteral Nutrition - Adult 1 Each TPN Continuous <Continuous>  sodium chloride 0.9% lock flush 10 milliLiter(s) IV Push every 1 hour PRN Pre/post blood products, medications, blood draw, and to maintain line patency    Genitourinary Medications    Hematologic/Oncologic Medications  enoxaparin Injectable 40 milliGRAM(s) SubCutaneous every 24 hours    Antimicrobial/Immunologic Medications  fluconAZOLE IVPB 200 milliGRAM(s) IV Intermittent every 24 hours  meropenem  IVPB 1000 milliGRAM(s) IV Intermittent every 8 hours    Endocrine/Metabolic Medications    Topical/Other Medications  chlorhexidine 2% Cloths 1 Application(s) Topical <User Schedule>  nystatin Powder 1 Application(s) Topical two times a day      ICU Vital Signs Last 24 Hrs  T(C): 36.1 (27 May 2025 04:00), Max: 36.3 (26 May 2025 12:00)  T(F): 97 (27 May 2025 04:00), Max: 97.4 (26 May 2025 12:00)  HR: 79 (27 May 2025 04:00) (68 - 80)  BP: 107/58 (27 May 2025 04:00) (106/54 - 125/67)  BP(mean): 75 (27 May 2025 04:00) (75 - 92)  ABP: --  ABP(mean): --  RR: 26 (27 May 2025 04:00) (20 - 37)  SpO2: 100% (27 May 2025 04:00) (94% - 100%)    O2 Parameters below as of 27 May 2025 04:00  Patient On (Oxygen Delivery Method): T-piece    O2 Concentration (%): 40        Adult Advanced Hemodynamics Last 24 Hrs  CVP(mm Hg): --  CVP(cm H2O): --  CO: --  CI: --  PA: --  PA(mean): --  PCWP: --  SVR: --  SVRI: --  PVR: --  PVRI: --          I&O's Summary    26 May 2025 07:01  -  27 May 2025 07:00  --------------------------------------------------------  IN: 1862.8 mL / OUT: 2595 mL / NET: -732.2 mL      I&O's Detail    26 May 2025 07:01  -  27 May 2025 07:00  --------------------------------------------------------  IN:    Albumin 5%  - 250 mL: 50 mL    Fat Emulsion (Fish Oil &amp; Plant Based) 20% Infusion: 228.8 mL    IV PiggyBack: 100 mL    IV PiggyBack: 150 mL    TPN (Total Parenteral Nutrition): 1334 mL  Total IN: 1862.8 mL    OUT:    Bulb (mL): 80 mL    Indwelling Catheter - Urethral (mL): 2015 mL    Nasogastric/Oral tube (mL): 500 mL  Total OUT: 2595 mL    Total NET: -732.2 mL          PHYSICAL EXAM:    General/Neuro: GCS 11T, no focal deficits, following commands   Lungs: T piece. no respiratory distress  Cardiovascular: aflutter  Cardiac Rhythm: aflutter   GI: Abdomen soft, Non-tender, Non-distended.  Extremities: Extremities warm, pink, well-perfused.  Derm: Good skin turgor, no skin breakdown.    : Burger catheter in place.      LABS:  CAPILLARY BLOOD GLUCOSE      POCT Blood Glucose.: 118 mg/dL (27 May 2025 05:36)  POCT Blood Glucose.: 108 mg/dL (26 May 2025 23:38)  POCT Blood Glucose.: 127 mg/dL (26 May 2025 18:14)  POCT Blood Glucose.: 116 mg/dL (26 May 2025 12:01)                          7.4    9.26  )-----------( 193      ( 27 May 2025 05:20 )             23.4       05    146  |  112[H]  |  42[H]  ----------------------------<  117[H]  4.5   |  24  |  0.6[L]    Ca    10.1      27 May 2025 05:20  Phos  3.0       Mg     2.0         TPro  4.5[L]  /  Alb  2.7[L]  /  TBili  0.8  /  DBili  0.3  /  AST  17  /  ALT  8   /  AlkPhos  112        PT/INR - ( 27 May 2025 05:20 )   PT: 12.20 sec;   INR: 1.03 ratio         PTT - ( 27 May 2025 05:20 )  PTT:34.1 sec      Urinalysis Basic - ( 27 May 2025 05:20 )    Color: x / Appearance: x / SG: x / pH: x  Gluc: 117 mg/dL / Ketone: x  / Bili: x / Urobili: x   Blood: x / Protein: x / Nitrite: x   Leuk Esterase: x / RBC: x / WBC x   Sq Epi: x / Non Sq Epi: x / Bacteria: x

## 2025-05-27 NOTE — PROGRESS NOTE ADULT - SUBJECTIVE AND OBJECTIVE BOX
Gastroenterology progress note:     Patient is a 74y old  Female who presents with a chief complaint of Septic (16 May 2025 12:06)       Admitted on: 05-13-25    We are following the patient for:  GJ exchange      Interval History:  Was planned for exchange today   Procedure was cancelled because of operational issues in endoscopy unit.       PAST MEDICAL & SURGICAL HISTORY:  HTN (hypertension)      Diabetes mellitus      Obesity      Gastroesophageal reflux disease      Active asthma      Generalized OA      Afib      H/O hernia repair  2011 and revised in 2013      History of cholecystectomy      Status post debridement      H/O tracheostomy      S/P gastrostomy      S/P jejunostomy          MEDICATIONS  (STANDING):  acetaminophen   IVPB .. 1000 milliGRAM(s) IV Intermittent once  albuterol/ipratropium for Nebulization 3 milliLiter(s) Nebulizer every 6 hours  chlorhexidine 2% Cloths 1 Application(s) Topical <User Schedule>  enoxaparin Injectable 40 milliGRAM(s) SubCutaneous every 24 hours  fluconAZOLE IVPB 200 milliGRAM(s) IV Intermittent every 24 hours  nystatin Powder 1 Application(s) Topical two times a day  pantoprazole  Injectable 40 milliGRAM(s) IV Push daily  Parenteral Nutrition - Adult 1 Each (58 mL/Hr) TPN Continuous <Continuous>  Parenteral Nutrition - Adult 1 Each (58 mL/Hr) TPN Continuous <Continuous>  sodium chloride 3%  Inhalation 4 milliLiter(s) Inhalation every 6 hours    MEDICATIONS  (PRN):  sodium chloride 0.9% lock flush 10 milliLiter(s) IV Push every 1 hour PRN Pre/post blood products, medications, blood draw, and to maintain line patency      Allergies  No Known Allergies      Review of Systems:   Cardiovascular:  No Chest Pain, No Palpitations  Respiratory:  No Cough, No Dyspnea  Gastrointestinal:  As described in HPI  Skin:  No Skin Lesions, No Jaundice  Neuro:  No Syncope, No Dizziness    Physical Examination:  T(C): 36.1 (05-27-25 @ 12:00), Max: 36.2 (05-27-25 @ 00:00)  HR: 79 (05-27-25 @ 12:00) (78 - 86)  BP: 112/58 (05-27-25 @ 12:00) (107/58 - 125/67)  RR: 25 (05-27-25 @ 12:00) (21 - 36)  SpO2: 100% (05-27-25 @ 12:00) (96% - 100%)  Weight (kg): 82 (05-27-25 @ 05:00)    05-26-25 @ 07:01  -  05-27-25 @ 07:00  --------------------------------------------------------  IN: 1862.8 mL / OUT: 2595 mL / NET: -732.2 mL    05-27-25 @ 07:01  -  05-27-25 @ 18:12  --------------------------------------------------------  IN: 713.6 mL / OUT: 1220 mL / NET: -506.4 mL        GENERAL: AAOx3, no acute distress.  HEAD:  Atraumatic, Normocephalic  EYES: conjunctiva and sclera clear  NECK: Supple, no JVD or thyromegaly  CHEST/LUNG: Clear to auscultation bilaterally; No wheeze, rhonchi, or rales  HEART: Regular rate and rhythm; normal S1, S2, No murmurs.  ABDOMEN: Soft, nontender, nondistended; Bowel sounds present  NEUROLOGY: No asterixis or tremor.   SKIN: Intact, no jaundice     Data:                        7.4    9.26  )-----------( 193      ( 27 May 2025 05:20 )             23.4     Hgb trend:  7.4  05-27-25 @ 05:20  7.6  05-26-25 @ 06:06  7.5  05-25-25 @ 17:45  7.7  05-25-25 @ 06:48      05-24-25 @ 07:01  -  05-25-25 @ 07:00  --------------------------------------------------------  IN: 250 mL    05-25-25 @ 07:01  -  05-26-25 @ 07:00  --------------------------------------------------------  IN: 100 mL    05-26-25 @ 07:01  -  05-27-25 @ 07:00  --------------------------------------------------------  IN: 50 mL      05-27    146  |  112[H]  |  42[H]  ----------------------------<  117[H]  4.5   |  24  |  0.6[L]    Ca    10.1      27 May 2025 05:20  Phos  3.0     05-27  Mg     2.0     05-27    TPro  4.5[L]  /  Alb  2.7[L]  /  TBili  0.8  /  DBili  0.3  /  AST  17  /  ALT  8   /  AlkPhos  112  05-27    Liver panel trend:  TBili 0.8   /   AST 17   /   ALT 8   /   AlkP 112   /   Tptn 4.5   /   Alb 2.7    /   DBili 0.3      05-27  TBili 0.7   /   AST 15   /   ALT 7   /   AlkP 95   /   Tptn 4.4   /   Alb 2.7    /   DBili --      05-26  TBili 0.4   /   AST 10   /   ALT 9   /   AlkP 91   /   Tptn 4.1   /   Alb 2.1    /   DBili 0.2      05-21  TBili 0.4   /   AST 11   /   ALT 9   /   AlkP 82   /   Tptn 4.2   /   Alb 2.4    /   DBili 0.2      05-18      PT/INR - ( 27 May 2025 05:20 )   PT: 12.20 sec;   INR: 1.03 ratio         PTT - ( 27 May 2025 05:20 )  PTT:34.1 sec       Radiology:       Gastroenterology progress note:     Patient is a 74y old  Female who presents with a chief complaint of Septic (16 May 2025 12:06)       Admitted on: 05-13-25    We are following the patient for:  Exchange  of J tube to GJ    Interval History:  Was planned for exchange today   Procedure was cancelled today because of operational issues in endoscopy unit limiting ability to clean the endoscopes       PAST MEDICAL & SURGICAL HISTORY:  HTN (hypertension)      Diabetes mellitus      Obesity      Gastroesophageal reflux disease      Active asthma      Generalized OA      Afib      H/O hernia repair  2011 and revised in 2013      History of cholecystectomy      Status post debridement      H/O tracheostomy      S/P gastrostomy      S/P jejunostomy          MEDICATIONS  (STANDING):  acetaminophen   IVPB .. 1000 milliGRAM(s) IV Intermittent once  albuterol/ipratropium for Nebulization 3 milliLiter(s) Nebulizer every 6 hours  chlorhexidine 2% Cloths 1 Application(s) Topical <User Schedule>  enoxaparin Injectable 40 milliGRAM(s) SubCutaneous every 24 hours  fluconAZOLE IVPB 200 milliGRAM(s) IV Intermittent every 24 hours  nystatin Powder 1 Application(s) Topical two times a day  pantoprazole  Injectable 40 milliGRAM(s) IV Push daily  Parenteral Nutrition - Adult 1 Each (58 mL/Hr) TPN Continuous <Continuous>  Parenteral Nutrition - Adult 1 Each (58 mL/Hr) TPN Continuous <Continuous>  sodium chloride 3%  Inhalation 4 milliLiter(s) Inhalation every 6 hours    MEDICATIONS  (PRN):  sodium chloride 0.9% lock flush 10 milliLiter(s) IV Push every 1 hour PRN Pre/post blood products, medications, blood draw, and to maintain line patency      Allergies  No Known Allergies      Review of Systems:   Cardiovascular:  No Chest Pain, No Palpitations  Respiratory:  No Cough, No Dyspnea  Gastrointestinal:  As described in HPI  Skin:  No Skin Lesions, No Jaundice  Neuro:  No Syncope, No Dizziness    Physical Examination:  T(C): 36.1 (05-27-25 @ 12:00), Max: 36.2 (05-27-25 @ 00:00)  HR: 79 (05-27-25 @ 12:00) (78 - 86)  BP: 112/58 (05-27-25 @ 12:00) (107/58 - 125/67)  RR: 25 (05-27-25 @ 12:00) (21 - 36)  SpO2: 100% (05-27-25 @ 12:00) (96% - 100%)  Weight (kg): 82 (05-27-25 @ 05:00)    05-26-25 @ 07:01  -  05-27-25 @ 07:00  --------------------------------------------------------  IN: 1862.8 mL / OUT: 2595 mL / NET: -732.2 mL    05-27-25 @ 07:01  -  05-27-25 @ 18:12  --------------------------------------------------------  IN: 713.6 mL / OUT: 1220 mL / NET: -506.4 mL        GENERAL: ill looking, trach, no acute distress.  HEAD:  Atraumatic, Normocephalic  EYES: conjunctiva and sclera clear  NECK: Supple, no JVD or thyromegaly  CHEST/LUNG: Dec air entry at bases   HEART: Regular rate and rhythm; normal S1, S2, No murmurs.  ABDOMEN: Soft, nontender, nondistended; Bowel sounds present , BULMARO drain with serous fluid   NEUROLOGY: No asterixis or tremor.   SKIN: Intact, no jaundice     Data:                        7.4    9.26  )-----------( 193      ( 27 May 2025 05:20 )             23.4     Hgb trend:  7.4  05-27-25 @ 05:20  7.6  05-26-25 @ 06:06  7.5  05-25-25 @ 17:45  7.7  05-25-25 @ 06:48      05-24-25 @ 07:01  -  05-25-25 @ 07:00  --------------------------------------------------------  IN: 250 mL    05-25-25 @ 07:01  -  05-26-25 @ 07:00  --------------------------------------------------------  IN: 100 mL    05-26-25 @ 07:01  -  05-27-25 @ 07:00  --------------------------------------------------------  IN: 50 mL      05-27    146  |  112[H]  |  42[H]  ----------------------------<  117[H]  4.5   |  24  |  0.6[L]    Ca    10.1      27 May 2025 05:20  Phos  3.0     05-27  Mg     2.0     05-27    TPro  4.5[L]  /  Alb  2.7[L]  /  TBili  0.8  /  DBili  0.3  /  AST  17  /  ALT  8   /  AlkPhos  112  05-27    Liver panel trend:  TBili 0.8   /   AST 17   /   ALT 8   /   AlkP 112   /   Tptn 4.5   /   Alb 2.7    /   DBili 0.3      05-27  TBili 0.7   /   AST 15   /   ALT 7   /   AlkP 95   /   Tptn 4.4   /   Alb 2.7    /   DBili --      05-26  TBili 0.4   /   AST 10   /   ALT 9   /   AlkP 91   /   Tptn 4.1   /   Alb 2.1    /   DBili 0.2      05-21  TBili 0.4   /   AST 11   /   ALT 9   /   AlkP 82   /   Tptn 4.2   /   Alb 2.4    /   DBili 0.2      05-18      PT/INR - ( 27 May 2025 05:20 )   PT: 12.20 sec;   INR: 1.03 ratio         PTT - ( 27 May 2025 05:20 )  PTT:34.1 sec       Radiology:

## 2025-05-27 NOTE — PROGRESS NOTE ADULT - ASSESSMENT
74y old Female with above PMHx, s/p exlap and second look laparotomy with billroth II and placement of J tube    Plan:  - preopd today for GI exchange of J tube to GJ tube  - monitor respiratory status  - monitor vik drain output, bilious  - f/u ID on abx  - f/u palliative for GOC re-eval  - TPN  - continue abx   - rest of care per SICU   - Monitor uop    SURGERY SPECTRA: [id number]

## 2025-05-27 NOTE — PROGRESS NOTE ADULT - ASSESSMENT
81F PMHx HTN, a-fib (on xarelto), solitary kidney, DM, GERD, prior open cholecystectomy c/b suture granulomas s/p 2019 abdominal wall debridements by Dr. Banda, respiratory distress with prolonged ventilation during 11/2024 admission, s/p 11/19 tracheostomy and gastrostomy and feeding jejunostomy tube by Dr. Champion, ya catheter dependence who presented to the ED from NH on 5/13 with 4 days progressively worsening abdominal pain. Found to have perforated peptic ulcer. S/p EX LAP AND ANTRECTOMY, ABD LEFT OPEN W/ ABTHERA and then 5/17 S/P 2ND LOOK LAP, BILROTH 2, PLACEMENT OF J tube. GI consulted for replacement of J tube with GJ tube.     #J tube replacement   #inflammatory changes noted on CTAP 5/19 possible anastomotic leak  - KUB noted to have J tube coiled in stomach.   - CTAP on 5/19 showing  Inflammatory changes inferior to the left lobe of the liver are nonspecific and may reflect postoperative in etiology as well as inflammatory or infectious etiology. Biliary leak cannot be excluded either. Can consider HIDA scan (biliary leak study)   - LFTs WNL   - no leukocytosis, Afebrile   - HP drain in place, bilious output    Rec  Will tentatively plan for replacement of J tube with GJ tube tomorrow  NPO after midnight hold tube feeds  Monitor drain output   Rest of care per surgery    81F PMHx HTN, a-fib (on xarelto), solitary kidney, DM, GERD, prior open cholecystectomy c/b suture granulomas s/p 2019 abdominal wall debridements by Dr. Banda, respiratory distress with prolonged ventilation during 11/2024 admission, s/p 11/19 tracheostomy and gastrostomy and feeding jejunostomy tube by Dr. Champion, ya catheter dependence who presented to the ED from NH on 5/13 with 4 days progressively worsening abdominal pain. Found to have perforated peptic ulcer. S/p EX LAP AND ANTRECTOMY, ABD LEFT OPEN W/ ABTHERA and then 5/17 S/P 2ND LOOK LAP, BILROTH 2, PLACEMENT OF J tube. GI consulted for replacement of J tube with GJ tube.     #Possible Anastomotic site leak   #J tube exchange to GJ tube based on surgery team request to help healing suspected leak and to restore feeding as J tube noted to be coiled in stomach   #Inflammatory changes noted on CT AP 5/19 possible anastomotic leak  - KUB noted to have J tube coiled in stomach.   - CTAP on 5/19 showing  Inflammatory changes inferior to the left lobe of the liver are nonspecific and may reflect postoperative in etiology as well as inflammatory or infectious etiology. Biliary leak cannot be excluded either. Can consider HIDA scan (biliary leak study)   - LFTs WNL   - no leukocytosis, Afebrile   - HP drain in place, bilious output    Rec  Will tentatively plan for replacement of J tube with GJ tube tomorrow, risks and benefit of EGD discussed  Risk of  dehiscence in context of recent surgery discussed   PPI bid   NPO after midnight hold tube feeds  Monitor drain output   Rest of care per surgery

## 2025-05-27 NOTE — PROGRESS NOTE ADULT - SUBJECTIVE AND OBJECTIVE BOX
DARCIE PATRICIA  74y, Female  Allergy: No Known Allergies      LOS  14d    CHIEF COMPLAINT: Septic (16 May 2025 12:06)      INTERVAL EVENTS/HPI  - No acute events overnight  - T(F): , Max: 97.6 (05-26-25 @ 08:00)  - Tolerating medication  - WBC Count: 9.26 (05-27-25 @ 05:20)  WBC Count: 10.92 (05-26-25 @ 06:06)     - Creatinine: 0.6 (05-27-25 @ 05:20)  Creatinine: 0.7 (05-26-25 @ 17:39)       ROS  **    VITALS:  T(F): 97, Max: 97.6 (05-26-25 @ 08:00)  HR: 79  BP: 107/58  RR: 26Vital Signs Last 24 Hrs  T(C): 36.1 (27 May 2025 04:00), Max: 36.4 (26 May 2025 08:00)  T(F): 97 (27 May 2025 04:00), Max: 97.6 (26 May 2025 08:00)  HR: 79 (27 May 2025 04:00) (67 - 80)  BP: 107/58 (27 May 2025 04:00) (106/54 - 125/67)  BP(mean): 75 (27 May 2025 04:00) (75 - 92)  RR: 26 (27 May 2025 04:00) (20 - 37)  SpO2: 100% (27 May 2025 04:00) (94% - 100%)    Parameters below as of 27 May 2025 04:00  Patient On (Oxygen Delivery Method): T-piece    O2 Concentration (%): 40    PHYSICAL EXAM:  **    FH: Non-contributory  Social Hx: Non-contributory    TESTS & MEASUREMENTS:                        7.4    9.26  )-----------( 193      ( 27 May 2025 05:20 )             23.4     05-27    146  |  112[H]  |  42[H]  ----------------------------<  117[H]  4.5   |  24  |  0.6[L]    Ca    10.1      27 May 2025 05:20  Phos  3.0     05-27  Mg     2.0     05-27    TPro  4.5[L]  /  Alb  2.7[L]  /  TBili  0.8  /  DBili  0.3  /  AST  17  /  ALT  8   /  AlkPhos  112  05-27      LIVER FUNCTIONS - ( 27 May 2025 05:20 )  Alb: 2.7 g/dL / Pro: 4.5 g/dL / ALK PHOS: 112 U/L / ALT: 8 U/L / AST: 17 U/L / GGT: x           Urinalysis Basic - ( 27 May 2025 05:20 )    Color: x / Appearance: x / SG: x / pH: x  Gluc: 117 mg/dL / Ketone: x  / Bili: x / Urobili: x   Blood: x / Protein: x / Nitrite: x   Leuk Esterase: x / RBC: x / WBC x   Sq Epi: x / Non Sq Epi: x / Bacteria: x          INFECTIOUS DISEASES TESTING  MRSA PCR Result.: Negative (05-14-25 @ 07:20)  MRSA PCR Result.: Negative (04-14-25 @ 17:16)  Procalcitonin: 5.22 (11-05-24 @ 19:32)  Vancomycin Level, Random: 7.4 (11-05-24 @ 05:15)  MRSA PCR Result.: Negative (11-04-24 @ 13:15)  Vancomycin Level, Random: 8.2 (11-04-24 @ 07:19)      INFLAMMATORY MARKERS  Sedimentation Rate, Erythrocyte: 45 mm/hr (05-14-25 @ 22:15)  C-Reactive Protein: 154.6 mg/L (05-14-25 @ 22:15)      RADIOLOGY & ADDITIONAL TESTS:  I have personally reviewed the last available Chest xray  CXR  Xray Chest 1 View- PORTABLE-Urgent:   ACC: [id number] EXAM:  XR CHEST PORTABLE URGENT 1V   ORDERED BY: NEGIN ALY     PROCEDURE DATE:  05/25/2025          INTERPRETATION:  CLINICAL HISTORY / REASON FOR EXAM: Acute respiratory   failure.    COMPARISON: Chest radiograph from May 24,2025.    TECHNIQUE/POSITIONING: Satisfactory. Single image, AP chest radiograph.    FINDINGS:    SUPPORT DEVICES: Tracheostomy tube overlies the mid trachea. Enteric tube   courses below the left hemidiaphragm, with distal tip out of the field of   view. Left subclavian central venous catheter with distal tip overlying   the SVC.    CARDIAC/MEDIASTINUM/HILUM: Unchanged cardiac silhouette.    LUNG PARENCHYMA/PLEURA: Bilateral opacities and effusions, right greater   than left.    SKELETON/SOFT TISSUES: Unchanged.      IMPRESSION:    Unchanged bilateral opacities and effusions, right greater than left.    --- End of Report ---            DAVID REDMAN MD; Attending Radiologist  This document has been electronically signed. May 25 2025 11:52AM (05-25-25 @ 10:56)      CT      CARDIOLOGY TESTING  recent ekg reviewed    MEDICATIONS  acetaminophen   IVPB .. 1000 IV Intermittent once  albuterol/ipratropium for Nebulization 3 Nebulizer every 6 hours  chlorhexidine 2% Cloths 1 Topical <User Schedule>  enoxaparin Injectable 40 SubCutaneous every 24 hours  fluconAZOLE IVPB 200 IV Intermittent every 24 hours  lipid, fat emulsion (Fish Oil and Plant Based) 20% Infusion 0.5045 IV Continuous <Continuous>  meropenem  IVPB 1000 IV Intermittent every 8 hours  nystatin Powder 1 Topical two times a day  pantoprazole  Injectable 40 IV Push daily  Parenteral Nutrition - Adult 1 TPN Continuous <Continuous>  sodium chloride 3%  Inhalation 4 Inhalation every 6 hours      WEIGHT  Weight (kg): 82 (05-27-25 @ 05:00)  Creatinine: 0.6 mg/dL (05-27-25 @ 05:20)  Creatinine: 0.7 mg/dL (05-26-25 @ 17:39)      ANTIBIOTICS:  fluconAZOLE IVPB 200 milliGRAM(s) IV Intermittent every 24 hours  meropenem  IVPB 1000 milliGRAM(s) IV Intermittent every 8 hours      All available historical records have been reviewed       PATRICIA SPRAGUE  74y, Female  Allergy: No Known Allergies      LOS  14d    CHIEF COMPLAINT: Septic (16 May 2025 12:06)      INTERVAL EVENTS/HPI  - No acute events overnight  - T(F): , Max: 97.6 (05-26-25 @ 08:00)  - Tolerating medication  - WBC Count: 9.26 (05-27-25 @ 05:20)  WBC Count: 10.92 (05-26-25 @ 06:06)     - Creatinine: 0.6 (05-27-25 @ 05:20)  Creatinine: 0.7 (05-26-25 @ 17:39)       ROS  General: Denies rigors, nightsweats  HEENT: Denies headache, rhinorrhea, sore throat, eye pain  CV: Denies CP, palpitations  PULM: Denies wheezing, hemoptysis  GI: Denies hematemesis, hematochezia, melena  : Denies discharge, hematuria  MSK: Denies arthralgias, myalgias  SKIN: Denies rash, lesions  NEURO: Denies paresthesias, weakness  PSYCH: Denies depression, anxiety     VITALS:  T(F): 97, Max: 97.6 (05-26-25 @ 08:00)  HR: 79  BP: 107/58  RR: 26Vital Signs Last 24 Hrs  T(C): 36.1 (27 May 2025 04:00), Max: 36.4 (26 May 2025 08:00)  T(F): 97 (27 May 2025 04:00), Max: 97.6 (26 May 2025 08:00)  HR: 79 (27 May 2025 04:00) (67 - 80)  BP: 107/58 (27 May 2025 04:00) (106/54 - 125/67)  BP(mean): 75 (27 May 2025 04:00) (75 - 92)  RR: 26 (27 May 2025 04:00) (20 - 37)  SpO2: 100% (27 May 2025 04:00) (94% - 100%)    Parameters below as of 27 May 2025 04:00  Patient On (Oxygen Delivery Method): T-piece    O2 Concentration (%): 40    PHYSICAL EXAM:  Gen: chronically ill appearing , trach   HEENT: Normocephalic, atraumatic  Neck: supple, no lymphadenopathy  CV: Regular rate & regular rhythm  Lungs: decreased BS at bases, no fremitus  Abdomen: Soft, BS present BULMARO serous fluid, incision clean, no erythema/purulence   Ext: Warm, well perfused  Neuro: non focal,   Skin: no rash, no erythema  Lines: no phlebitis     FH: Non-contributory  Social Hx: Non-contributory    TESTS & MEASUREMENTS:                        7.4    9.26  )-----------( 193      ( 27 May 2025 05:20 )             23.4     05-27    146  |  112[H]  |  42[H]  ----------------------------<  117[H]  4.5   |  24  |  0.6[L]    Ca    10.1      27 May 2025 05:20  Phos  3.0     05-27  Mg     2.0     05-27    TPro  4.5[L]  /  Alb  2.7[L]  /  TBili  0.8  /  DBili  0.3  /  AST  17  /  ALT  8   /  AlkPhos  112  05-27      LIVER FUNCTIONS - ( 27 May 2025 05:20 )  Alb: 2.7 g/dL / Pro: 4.5 g/dL / ALK PHOS: 112 U/L / ALT: 8 U/L / AST: 17 U/L / GGT: x           Urinalysis Basic - ( 27 May 2025 05:20 )    Color: x / Appearance: x / SG: x / pH: x  Gluc: 117 mg/dL / Ketone: x  / Bili: x / Urobili: x   Blood: x / Protein: x / Nitrite: x   Leuk Esterase: x / RBC: x / WBC x   Sq Epi: x / Non Sq Epi: x / Bacteria: x          INFECTIOUS DISEASES TESTING  MRSA PCR Result.: Negative (05-14-25 @ 07:20)  MRSA PCR Result.: Negative (04-14-25 @ 17:16)  Procalcitonin: 5.22 (11-05-24 @ 19:32)  Vancomycin Level, Random: 7.4 (11-05-24 @ 05:15)  MRSA PCR Result.: Negative (11-04-24 @ 13:15)  Vancomycin Level, Random: 8.2 (11-04-24 @ 07:19)      INFLAMMATORY MARKERS  Sedimentation Rate, Erythrocyte: 45 mm/hr (05-14-25 @ 22:15)  C-Reactive Protein: 154.6 mg/L (05-14-25 @ 22:15)      RADIOLOGY & ADDITIONAL TESTS:  I have personally reviewed the last available Chest xray  CXR  Xray Chest 1 View- PORTABLE-Urgent:   ACC: [id number] EXAM:  XR CHEST PORTABLE URGENT 1V   ORDERED BY: NEGIN ALY     PROCEDURE DATE:  05/25/2025          INTERPRETATION:  CLINICAL HISTORY / REASON FOR EXAM: Acute respiratory   failure.    COMPARISON: Chest radiograph from May 24,2025.    TECHNIQUE/POSITIONING: Satisfactory. Single image, AP chest radiograph.    FINDINGS:    SUPPORT DEVICES: Tracheostomy tube overlies the mid trachea. Enteric tube   courses below the left hemidiaphragm, with distal tip out of the field of   view. Left subclavian central venous catheter with distal tip overlying   the SVC.    CARDIAC/MEDIASTINUM/HILUM: Unchanged cardiac silhouette.    LUNG PARENCHYMA/PLEURA: Bilateral opacities and effusions, right greater   than left.    SKELETON/SOFT TISSUES: Unchanged.      IMPRESSION:    Unchanged bilateral opacities and effusions, right greater than left.    --- End of Report ---            DAVID REDMAN MD; Attending Radiologist  This document has been electronically signed. May 25 2025 11:52AM (05-25-25 @ 10:56)      CT      CARDIOLOGY TESTING  recent ekg reviewed    MEDICATIONS  acetaminophen   IVPB .. 1000 IV Intermittent once  albuterol/ipratropium for Nebulization 3 Nebulizer every 6 hours  chlorhexidine 2% Cloths 1 Topical <User Schedule>  enoxaparin Injectable 40 SubCutaneous every 24 hours  fluconAZOLE IVPB 200 IV Intermittent every 24 hours  lipid, fat emulsion (Fish Oil and Plant Based) 20% Infusion 0.5045 IV Continuous <Continuous>  meropenem  IVPB 1000 IV Intermittent every 8 hours  nystatin Powder 1 Topical two times a day  pantoprazole  Injectable 40 IV Push daily  Parenteral Nutrition - Adult 1 TPN Continuous <Continuous>  sodium chloride 3%  Inhalation 4 Inhalation every 6 hours      WEIGHT  Weight (kg): 82 (05-27-25 @ 05:00)  Creatinine: 0.6 mg/dL (05-27-25 @ 05:20)  Creatinine: 0.7 mg/dL (05-26-25 @ 17:39)      ANTIBIOTICS:  fluconAZOLE IVPB 200 milliGRAM(s) IV Intermittent every 24 hours  meropenem  IVPB 1000 milliGRAM(s) IV Intermittent every 8 hours      All available historical records have been reviewed

## 2025-05-27 NOTE — ADVANCED PRACTICE NURSE CONSULT - ASSESSMENT
History of Present Illness:   Pt is 81F PMHx HTN, a-fib (on xarelto), solitary kidney, DM, GERD, prior open cholecystectomy c/b suture granulomas s/p 2019 abdominal wall debridements by Dr. Banda, respiratory distress with prolonged ventilation during 11/2024 admission, s/p 11/19 tracheostomy and gastrostomy and feeding jejunostomy tube by Dr. Champion, ya catheter dependence who presented to the ED from NH on 5/13 with 4 days progressively worsening abdominal pain. Surgery consulted for imaging concerning for duodenal perforation. History limited by pt's minimally-verbal status. Spoke with daughter on phone, pt is full code at this time,     Allergies:  	No Known Allergies:     Home Medications:   * Patient Currently Takes Medications as of 18-Apr-2025 11:53 documented in Structured Notes  · 	Multiple Vitamins with Minerals oral liquid: 10 milliliter(s) by jejunostomy tube once a day  · 	albuterol 90 mcg/inh inhalation aerosol: 2 puff(s) inhaled every 6 hours As needed Shortness of Breath and/or Wheezing  · 	cholecalciferol oral tablet: 400 unit(s) orally once a day  · 	alendronate 70 mg oral tablet: 1 tab(s) orally once a week  · 	losartan 100 mg oral tablet: 1 tab(s) orally once a day  · 	rivaroxaban 15 mg oral tablet: 1 tab(s) orally once a day    Patient received in bed, limited mobility, high risk for pressure injury development or progression.    Wound – Entered as left buttock DTI and sacrum stage II – inappropriate documentation – entered in error  Type & Location: Sacral and bilateral gluteal fold friction wounds   Tissue Description: light brown peeling tissue with epidermal skin loss as well as purple hyperpigmentation to bilateral gluteal folds   Wound Exudate: Scant, serous   Wound Edge: intact, irregular  Opal wound Condition: intact    Friction wound to thoracic spine noted.

## 2025-05-27 NOTE — PROGRESS NOTE ADULT - SUBJECTIVE AND OBJECTIVE BOX
GENERAL SURGERY PROGRESS NOTE     PATRICIA SPRAGUE  74[facility]    POD: 13d  Procedure: Partial antrectomy    US Doppler guided insertion of central venous catheter    Laparotomy, second look, with packing removal    Billroth II operation    Replacement of gastrojejunostomy tube      Surgical Attending: Yumi Pena  24 hour events:preopd for GI intervention today to replace J to GJ tube. +g, +bm.    PHYSICAL EXAM:  General: NAD  Cardiac: RRR  Respiratory: satting well on t piece  Abdomen: Soft, non-distended, non-tender, J in place. NGT intact, BULMARO light green (bilious) output  Vascular: extremities well perfused  Skin: Warm/dry, normal color, no jaundice      T(F): 97 (05-27-25 @ 08:00), Max: 97.4 (05-26-25 @ 12:00)  HR: 86 (05-27-25 @ 08:00) (68 - 86)  BP: 122/62 (05-27-25 @ 08:00) (106/54 - 125/67)  ABP: --  ABP(mean): --  RR: 25 (05-27-25 @ 08:42) (20 - 36)  SpO2: 100% (05-27-25 @ 08:42) (94% - 100%)    IN'S / OUT's:    05-26-25 @ 07:01  -  05-27-25 @ 07:00  --------------------------------------------------------  IN:    Albumin 5%  - 250 mL: 50 mL    Fat Emulsion (Fish Oil &amp; Plant Based) 20% Infusion: 228.8 mL    IV PiggyBack: 100 mL    IV PiggyBack: 150 mL    TPN (Total Parenteral Nutrition): 1334 mL  Total IN: 1862.8 mL    OUT:    Bulb (mL): 80 mL    Indwelling Catheter - Urethral (mL): 2015 mL    Nasogastric/Oral tube (mL): 500 mL  Total OUT: 2595 mL    Total NET: -732.2 mL      05-27-25 @ 07:01  -  05-27-25 @ 09:43  --------------------------------------------------------  IN:    Fat Emulsion (Fish Oil &amp; Plant Based) 20% Infusion: 41.6 mL    TPN (Total Parenteral Nutrition): 116 mL  Total IN: 157.6 mL    OUT:    Bulb (mL): 30 mL    Indwelling Catheter - Urethral (mL): 200 mL    Nasogastric/Oral tube (mL): 0 mL    Oral Fluid: 0 mL  Total OUT: 230 mL    Total NET: -72.4 mL          MEDICATIONS  (STANDING):  acetaminophen   IVPB .. 1000 milliGRAM(s) IV Intermittent once  albuterol/ipratropium for Nebulization 3 milliLiter(s) Nebulizer every 6 hours  chlorhexidine 2% Cloths 1 Application(s) Topical <User Schedule>  enoxaparin Injectable 40 milliGRAM(s) SubCutaneous every 24 hours  fluconAZOLE IVPB 200 milliGRAM(s) IV Intermittent every 24 hours  lipid, fat emulsion (Fish Oil and Plant Based) 20% Infusion 0.5045 Gm/kG/Day (20.8 mL/Hr) IV Continuous <Continuous>  meropenem  IVPB 1000 milliGRAM(s) IV Intermittent every 8 hours  nystatin Powder 1 Application(s) Topical two times a day  pantoprazole  Injectable 40 milliGRAM(s) IV Push daily  Parenteral Nutrition - Adult 1 Each (58 mL/Hr) TPN Continuous <Continuous>  Parenteral Nutrition - Adult 1 Each (58 mL/Hr) TPN Continuous <Continuous>  sodium chloride 3%  Inhalation 4 milliLiter(s) Inhalation every 6 hours    MEDICATIONS  (PRN):  sodium chloride 0.9% lock flush 10 milliLiter(s) IV Push every 1 hour PRN Pre/post blood products, medications, blood draw, and to maintain line patency      LABS  Labs:  CAPILLARY BLOOD GLUCOSE      POCT Blood Glucose.: 118 mg/dL (27 May 2025 05:36)  POCT Blood Glucose.: 108 mg/dL (26 May 2025 23:38)  POCT Blood Glucose.: 127 mg/dL (26 May 2025 18:14)  POCT Blood Glucose.: 116 mg/dL (26 May 2025 12:01)                          7.4    9.26  )-----------( 193      ( 27 May 2025 05:20 )             23.4         05-27    146  |  112[H]  |  42[H]  ----------------------------<  117[H]  4.5   |  24  |  0.6[L]      Calcium: 10.1 mg/dL (05-27-25 @ 05:20)      LFTs:             4.5  | 0.8  | 17       ------------------[112     ( 27 May 2025 05:20 )  2.7  | 0.3  | 8           Lipase:x      Amylase:x             Coags:     12.20  ----< 1.03    ( 27 May 2025 05:20 )     34.1                Urinalysis Basic - ( 27 May 2025 05:20 )    Color: x / Appearance: x / SG: x / pH: x  Gluc: 117 mg/dL / Ketone: x  / Bili: x / Urobili: x   Blood: x / Protein: x / Nitrite: x   Leuk Esterase: x / RBC: x / WBC x   Sq Epi: x / Non Sq Epi: x / Bacteria: x            RADIOLOGY & ADDITIONAL TESTS:

## 2025-05-27 NOTE — PROGRESS NOTE ADULT - ATTENDING COMMENTS
Telehealth E-Visit      Patient Name: Grace Whittaker  : 1996   MRN: [id number]     Patient or patient representative verbalized consent for the use of Ambient Listening during the visit with  ANANYA Cruz for chart documentation. 3/31/2025  08:32 EDT    Chief Complaint:  Grace Whittaker is a 28 y.o. female who presents today via virtual visit, to follow up with medication management for ADHD    I have reviewed the E-Visit questionnaire and the patient's answers, my assessment and plan are listed below.     Mode of Visit: Video  Location of patient: -HOME-  Location of provider: +[facility]+  You have chosen to receive care through a telehealth visit.  The patient has signed the video visit consent form.  The visit included audio and video interaction. No technical issues occurred during this visit.    This provider is located at the [facility] (through [facility]), [address] [place] [address] using a secure [employer] Video Visit through [employer]. Patient is being seen remotely via telehealth from a secure environment in [place]. The patient's condition being diagnosed/treated is appropriate for telemedicine. The provider identified herself as well as her credentials. The patient, and/or patients guardian, consent to be seen remotely, and when consent is given they understand that the consent allows for patient identifiable information to be sent to a third party as needed. They may refuse to be seen remotely at any time. The electronic data is encrypted and password protected, and the patient and/or guardian has been advised of the potential risks to privacy not withstanding such measures.    You have chosen to receive care through a telehealth visit. Do you consent to use a video/audio connection for your medical care today? Yes    History of Present Illness  The patient is a 28-year-old female who presents via virtual visit today for follow-up  medication management.    She has been experiencing financial difficulties in affording her Qelbree, leading to a decision to temporarily discontinue its use. She plans to resume the use of Adderall, a medication she has previously found effective in managing her symptoms, particularly in relation to her academic performance and job search. She intends to return to the more expensive medication once her financial situation improves. She has observed a pattern of late-night wakefulness and daytime sleepiness, which she attributes to her current medication regimen. She also reports that she was previously on a twice-daily dose of Adderall 10 mg, which she found beneficial. She expresses a preference for maintaining this dosage, as it enabled her to perform tasks efficiently and effectively.    MEDICATIONS  Current: Adderall  Discontinued: Qelbree    Subjective      Review of Systems:   Review of Systems   Psychiatric/Behavioral:  Positive for decreased concentration and stress.        Medications:     Current Outpatient Medications:     albuterol sulfate  (90 Base) MCG/ACT inhaler, Inhale 2 puffs Every 4 (Four) Hours As Needed for Wheezing or Shortness of Air., Disp: , Rfl:     amphetamine-dextroamphetamine (Adderall) 10 MG tablet, Take 1 tablet by mouth 2 (Two) Times a Day., Disp: 60 tablet, Rfl: 0    hydrOXYzine (ATARAX) 10 MG tablet, Take 1 tablet by mouth 3 (Three) Times a Day As Needed for Anxiety (insomnia)., Disp: 90 tablet, Rfl: 0    Allergies:   No Known Allergies    Results Reviewed: n/a     The following portion of the patient's history were reviewed and updated appropriately: allergies, current and past medications, family history, medical history and social history.    Objective     Mental Status Exam:  MENTAL STATUS EXAM   General Appearance:  Cleanly groomed and dressed  Eye Contact:  Good eye contact  Attitude:  Cooperative  Motor Activity:  Normal gait, posture and fidgety  Muscle Strength:   Normal  Speech:  Normal rate, tone, volume  Language:  Spontaneous  Mood and affect:  Normal, pleasant  Hopelessness:  Denies  Loneliness: Denies  Thought Process:  Logical  Associations/ Thought Content:  No delusions  Hallucinations:  None  Suicidal Ideations:  Not present  Homicidal Ideation:  Not present  Sensorium:  Alert and clear  Orientation:  Person, place, situation and time  Immediate Recall, Recent, and Remote Memory:  Intact  Attention Span/ Concentration:  Easily distracted  Fund of Knowledge:  Appropriate for age and educational level  Intellectual Functioning:  Average range  Insight:  Good  Judgement:  Good  Reliability:  Good  Impulse Control:  Good      SUICIDE RISK ASSESSMENT/CSSRS:  1. Does patient have thoughts of suicide? no  2. Does patient have intent for suicide? no  3. Does patient have a current plan for suicide? no  4. History of suicide attempts: no  5. Family history of suicide or attempts: no  6. History of violent behaviors towards others or property or thoughts of committing suicide: no  7. History of sexual aggression toward others: no  8. Access to firearms or weapons: no    Labs Reviewed: n/a  UDS Reviewed: 8/22/24, results as expected  Chart since last visit reviewed: yes    Assessment / Plan    Quality Measures:  Tobacco Cessation: Patient denies tobacco use. No tobacco cessation education necessary.      Depression (PHQ >9): Patient screened negative this visit with a PHQ score < 9. Will continue to monitor screening scores and provide supportive care.     Medication education:   Benzo: N/A  Stimulants: CSA up to date and on file      Risk Assessment: Risk of self-harm acutely is low. Risk of self-harm chronically is also low, but could be further elevated in the event of treatment noncompliance and/or AODA.    Impression/Formulation:  Patient appeared alert and oriented.  Patient is voluntarily seeking psychiatric care at [facility].  Patient is  receptive to assistance with maintaining a stable lifestyle.  Conditions being treated include     ICD-10-CM ICD-9-CM   1. ADHD (attention deficit hyperactivity disorder), inattentive type  F90.0 314.00   .     Assessment:   Diagnoses and all orders for this visit:    1. ADHD (attention deficit hyperactivity disorder), inattentive type (Primary)  -     amphetamine-dextroamphetamine (Adderall) 10 MG tablet; Take 1 tablet by mouth 2 (Two) Times a Day.  Dispense: 60 tablet; Refill: 0      Assessment & Plan  ADHD.  She reported that the Qelbree was too expensive, leading her to take a break from it. She expressed a preference to return to Adderall, which she knows works for her. She was informed about the option of continuing Qelbree with samples until she gets insurance, but she opted for Adderall. A prescription for Adderall 10 mg, to be taken twice daily, will be sent to [facility] in [place]. She was advised to cut a pill in half if the dosage feels too high. A follow-up appointment will be scheduled in 3 months to reassess her condition and medication needs.    Follow-up  The patient will follow up in 3 months.      Any medications prescribed have been sent electronically to [facility] in [place].    Patient will continue supportive psychotherapy efforts and medications as indicated. Discussed medication options and treatment plan of prescribed medication(s) as well as the risks, benefits, and potential side effects. Patient ackowledged and verbally consented to continue with current treatment plan and was educated on the importance of compliance with treatment and follow-up appointments. Patient seems reasonably able to adhere to treatment plan.      Assisted Patient in identifying risk factors which would indicate the need for higher level of care including thoughts to harm self or others and/or self-harming behavior and encouraged Patient to contact this office, call 911, or present to the nearest emergency room  Critical Care: [id number]   This patient has a high probability of sudden, clinically significant deterioration, which requires the highest level of physician preparedness to intervene urgently. I managed/supervised life or organ supporting interventions that required frequent physician assessment. I devoted my full attention in the ICU to the direct care of this patient for the period of time indicated below. Time I spent with the family or surrogate(s) is included only if the patient was incapable of providing the necessary information or participating in medical decision making. Time devoted to teaching and to any procedures I billed separately is not included.     PATRICIA SPRAGUE 74y Female admitted to perforated peptic ulcer now s/p ex lap, distal antrectomy, abthera placement requiring vasopressors. RTOR 5/16 s/p second look laparotomy, Billroth II reconstruction, gastrojejunostomy tube replaced with a jejunal feeding tube and abdominal wall closure.  duodenal stump leak, treating with antibiotics.   Patient is examined and evaluated at the bedside with SICU team. Treatment plan discussed with SICU team, nurses and primary team.   Chest X-ray and all relevant studies reviewed during rounds.  Will continue hemodynamic monitoring as per protocol in SICU.    Neuro: PRN pain meds, delirium precautions  CV: continue to monitor  Respiratory: chronic trach, right pleural effusion is improving, developing a left pleural effusion, chest PT, PRN nebs, diurese, daily CXR  GI: GJ tube placement tomorrow, strict NPO, no anastomotic leak,   TPN for nutrition   Renal: history of solitary kidney, NOLA, now improving  diuresing with goal of net even and or negative   ID: ID following, can stop IV anti microbials today  continue with IV antibiotics   Heme: acute blood loss anemia, goal Hb > 7  Endocrine: ISS  PV: follow pulse exam  Skin: decub precautions  DVT Prophylaxis:  SCDs  chemical DVT ppx  Stress Gastritis Prophylaxis: PPI  Mobility: PT as able     I have personally and independently provided [ 35 ] minutes of critical care services. This excludes any time spent on separate procedures or teaching. I have reviewed and amended the note as needed. Treatment plan discussed with SICU team, nurses and primary team at the time of the multidisciplinary rounds. The above note is NOT written at the time of rounds and will reflect all changes throughout management of the patient for the day note is written for.    Madai Peters MD  Trauma/ACS/SICU Attending . should any of these events occur. Discussed crisis intervention services and means to access. Clinic will obtain release of information for current treatment team for continuity of care as needed. Patient adamantly and convincingly denies current suicidal or homicidal ideation or perceptual disturbance.       Follow Up:   Return in about 3 months (around 6/30/2025) for Medication Management.      20 minutes were spent reviewing the patient's questionnaire, formulating a treatment plan, and relaying information to the patient via [employer] [employer].        ANANYA Cruz  [facility]    This is electronically signed by ANANYA Cruz  03/31/2025 08:31 EDT    Critical Care: [id number]   This patient has a high probability of sudden, clinically significant deterioration, which requires the highest level of physician preparedness to intervene urgently. I managed/supervised life or organ supporting interventions that required frequent physician assessment. I devoted my full attention in the ICU to the direct care of this patient for the period of time indicated below. Time I spent with the family or surrogate(s) is included only if the patient was incapable of providing the necessary information or participating in medical decision making. Time devoted to teaching and to any procedures I billed separately is not included.     PATRICIA SPRAGUE 74y Female admitted to perforated peptic ulcer now s/p ex lap, distal antrectomy, abthera placement requiring vasopressors. RTOR 5/16 s/p second look laparotomy, Billroth II reconstruction, gastrojejunostomy tube replaced with a jejunal feeding tube and abdominal wall closure.  duodenal stump leak, treating with antibiotics.   Patient is examined and evaluated at the bedside with SICU team. Treatment plan discussed with SICU team, nurses and primary team.   Chest X-ray and all relevant studies reviewed during rounds.  Will continue hemodynamic monitoring as per protocol in SICU.    Neuro: PRN pain meds, delirium precautions  CV: continue to monitor  Respiratory: chronic trach, right pleural effusion is improving, developing a left pleural effusion, chest PT, PRN nebs, diurese, daily CXR  GI: GJ tube placement tomorrow, strict NPO, no anastomotic leak,   TPN for nutrition   Renal: history of solitary kidney, NOLA, now improving  diuresing with goal of net even and or negative   ID: ID following, can stop IV anti microbials today  Heme: acute blood loss anemia, goal Hb > 7  Endocrine: ISS  PV: follow pulse exam  Skin: decub precautions  DVT Prophylaxis:  SCDs  chemical DVT ppx  Stress Gastritis Prophylaxis: PPI  Mobility: PT as able     I have personally and independently provided [ 35 ] minutes of critical care services. This excludes any time spent on separate procedures or teaching. I have reviewed and amended the note as needed. Treatment plan discussed with SICU team, nurses and primary team at the time of the multidisciplinary rounds. The above note is NOT written at the time of rounds and will reflect all changes throughout management of the patient for the day note is written for.    Madai Peters MD  Trauma/ACS/SICU Attending .

## 2025-05-27 NOTE — PROGRESS NOTE ADULT - ASSESSMENT
Assessment	  74F found to have perforated peptic ulcer now s/p ex lap, distal antrectomy, abthera placement requiring vasopressors. RTOR 5/16 s/p second look laparotomy, Billroth II reconstruction, gastrojejunostomy tube replaced with a jejunal feeding tube, abdomen closed at the end of the case.     NEUROLOGICAL:  #Acute pain  - IV APAP while NPO   - Dilaudid 0.25 PRN    RESPIRATORY:   #Respiratory failure, s/p tracheostomy (11/24)    - now with 8 portex cuffed trach (switched in OR on 5/14)    - tolerating T- piece    - continue duonebs    - lasix for diuresis given daily, last dose 5/26   #Decreased small bibasilar opacities/atelectasis, right greater than left on CT  - 5/16: s/p deep suctioning by anesthesia w/ copious secretions   #h/o asthma  - atrovent and albuterol   #Increased secretions  - 3% inhaled NS  # COVID+ on admission --- resolved  - s/p Remdesivir  - off contact precautions 5/24  #Activity   - increase as tolerated     CARDS:   #Nonsustained Vtach - self resolved   - cards consult: management of septic shock. Start short acting AC for afib. Start beta blockers once off pressors   #hypotension 2/2 sepsis   - off vasopressors since 5/19  -25% 50cc x 1 for SBP 90s, improved to 120s   - MAP >65  - Holding midodrine 10 mg Q8h while NPO  #hx afib  - transiently was in AFIB slow ventricular response (HR 40s-60s)  - holding home Xarelto 15 QD  #hx HTN  - holding home losartan 100QD  #elevated troponin  - 143 > 84, no longer trending  #TTE 11/24: EF 71%. Mild-mod AS. Trivial pericardial effusion.   - Echo 5/15: EF 30-35%, multiple left ventricular motion wall abnormalities. Possible Takotsubo vs ischemic. Global left ventricular systolic function.     GASTROINTESTINAL/NUTRITION:   #perforated prepyloric gastric ulcer s/p exploratory laparotomy antrectomy and D1 stapled off with temporary abdominal closure and abthera in place  - 5/16: RTOR s/p bilroth 2, J tube placement, RLQ maliha drain by anastomosis extending to duodenal stump  - 5/18: BULMARO drain w/ bilious output > pt made strict NPO, BULMARO drain bilirubin 9.7, LFTs WNL  #Diet, NPO strict  - 5/23: s/p PICC by IR, started on TPN  - J tube study performed 5/21 --> GI consulted for exchange of J for GJ  -PEG-J tube adjustment tomorrow by GI   - aspiration precautions, HOB 30  #GI Prophylaxis    - protonix 40mg QD IV  #Bowel regimen    -holding    -last bowel movement 5/25 PM    /RENAL:   #urine output in critically ill    -chronic indwelling ya > new ya exchanged 5/17  #metabolic acidosis, resolved  #NOLA (baseline creatine 0.6), improving   - nephrology consulted > no RRT at this time  #Hx of solitary kidney    Labs:   BUN/Cr- 40/0.7  -->,  41/0.7  -->    [05-26 @ 17:39]Na  148 // K  4.4 // Mg  2.0 // Phos  3.0  [05-26 @ 06:06]Na  146 // K  4.2 // Mg  2.4 // Phos  3.0      HEME/ONC:   #DVT prophylaxis    -Lovenox    -SCDs  #anemia  - 1pRBC 5/17 to help wean off vasopressors    Labs: Hb/Hct:  7.5/23.8  (05-25 @ 17:45)  -->,  7.6/24.2  (05-26 @ 06:06)  -->                      Plts:  186  -->,  199  -->                 PTT/INR:          ID:  #perforated duodenal ulcer  - Meropenem (5/16 - )  - Nystatin powder for groin rash (5/15 - )  Completed antibiotics:  - zosyn (renal dosing) (5/14 - 5/16)  - caspofungin per primary team (5/14 - 5/20)  - Fluconazole 200mg q24 (end date 5/24)  #COVID positive on admission --- resolved    - completed remdesivir 2 day course    - completed isolation precautions 5/23  #Cultures    - 5/13: blood culture: NGTD    - 5/13: blood culture: NGTD    - 5/13: urine culture: E Coli  - ID following  WBC- 11.33  --->>,  11.28  --->>,  10.92  --->>  Temp trend- 24hrs T(F): 97 (05-26 @ 20:00), Max: 97.6 (05-26 @ 08:00)  Current antibiotics-fluconAZOLE IVPB 200 every 24 hours  meropenem  IVPB 1000 every 8 hours      ENDOCRINE:  #Maintain euglycemia    -FSG q6 while NPO    -Glucose goal 140-180. If above 180, will start corrective insulin sliding scale  #hypercalcemia  - HOLDING  home cinacalcet 30mg bid    MSK:  #Activity - increase as tolerated   #hx osteoporosis  - holding home Alendronate 70mg weekly on Thursdays while NPO    SKIN:  #DTI screen  -stage 2 sacral ulcer   -wound c/s placed    - will continue to monitor daily skin changes    PALLIATIVE:  Currently full code, palliative consult; readdress goals 5/27      LINES/DRAINS:  PIV, Midline Catheter x2 (5/20-) Ya (replaced 5/17), J tube (5/16), 8 cuffed portex trach    Discontinued lines: R radial arterial line (5/14- 5/22), LIJ CVC (5/14- 5/20)  ADVANCED DIRECTIVES:  Full Code    HCP/Emergency Contact- daughter Cecilia [phone]  INDICATION FOR SICU/SDU:  perforated peptic ulcer; mechanical ventilation/vasopressors     DISPO:  SICU. Case discussed with attending Dr. Peters

## 2025-05-28 ENCOUNTER — RESULT REVIEW (OUTPATIENT)
Age: 75
End: 2025-05-28

## 2025-05-28 ENCOUNTER — TRANSCRIPTION ENCOUNTER (OUTPATIENT)
Age: 75
End: 2025-05-28

## 2025-05-28 LAB
ANION GAP SERPL CALC-SCNC: 10 MMOL/L — SIGNIFICANT CHANGE UP (ref 7–14)
ANION GAP SERPL CALC-SCNC: 10 MMOL/L — SIGNIFICANT CHANGE UP (ref 7–14)
BUN SERPL-MCNC: 44 MG/DL — HIGH (ref 10–20)
BUN SERPL-MCNC: 49 MG/DL — HIGH (ref 10–20)
CALCIUM SERPL-MCNC: 9.6 MG/DL — SIGNIFICANT CHANGE UP (ref 8.4–10.5)
CALCIUM SERPL-MCNC: 9.9 MG/DL — SIGNIFICANT CHANGE UP (ref 8.4–10.5)
CHLORIDE SERPL-SCNC: 108 MMOL/L — SIGNIFICANT CHANGE UP (ref 98–110)
CHLORIDE SERPL-SCNC: 111 MMOL/L — HIGH (ref 98–110)
CO2 SERPL-SCNC: 23 MMOL/L — SIGNIFICANT CHANGE UP (ref 17–32)
CO2 SERPL-SCNC: 24 MMOL/L — SIGNIFICANT CHANGE UP (ref 17–32)
CREAT SERPL-MCNC: 0.7 MG/DL — SIGNIFICANT CHANGE UP (ref 0.7–1.5)
CREAT SERPL-MCNC: 0.7 MG/DL — SIGNIFICANT CHANGE UP (ref 0.7–1.5)
EGFR: 92 ML/MIN/1.73M2 — SIGNIFICANT CHANGE UP
GLUCOSE SERPL-MCNC: 116 MG/DL — HIGH (ref 70–99)
GLUCOSE SERPL-MCNC: 119 MG/DL — HIGH (ref 70–99)
MAGNESIUM SERPL-MCNC: 1.9 MG/DL — SIGNIFICANT CHANGE UP (ref 1.8–2.4)
MAGNESIUM SERPL-MCNC: 2 MG/DL — SIGNIFICANT CHANGE UP (ref 1.8–2.4)
PHOSPHATE SERPL-MCNC: 3.2 MG/DL — SIGNIFICANT CHANGE UP (ref 2.1–4.9)
PHOSPHATE SERPL-MCNC: 3.4 MG/DL — SIGNIFICANT CHANGE UP (ref 2.1–4.9)
POTASSIUM SERPL-MCNC: 4.7 MMOL/L — SIGNIFICANT CHANGE UP (ref 3.5–5)
POTASSIUM SERPL-MCNC: 4.7 MMOL/L — SIGNIFICANT CHANGE UP (ref 3.5–5)
POTASSIUM SERPL-SCNC: 4.7 MMOL/L — SIGNIFICANT CHANGE UP (ref 3.5–5)
POTASSIUM SERPL-SCNC: 4.7 MMOL/L — SIGNIFICANT CHANGE UP (ref 3.5–5)
SODIUM SERPL-SCNC: 142 MMOL/L — SIGNIFICANT CHANGE UP (ref 135–146)
SODIUM SERPL-SCNC: 144 MMOL/L — SIGNIFICANT CHANGE UP (ref 135–146)

## 2025-05-28 PROCEDURE — 93306 TTE W/DOPPLER COMPLETE: CPT | Mod: 26

## 2025-05-28 PROCEDURE — 71045 X-RAY EXAM CHEST 1 VIEW: CPT | Mod: 26,77

## 2025-05-28 PROCEDURE — 74018 RADEX ABDOMEN 1 VIEW: CPT | Mod: 26

## 2025-05-28 PROCEDURE — 71045 X-RAY EXAM CHEST 1 VIEW: CPT | Mod: 26

## 2025-05-28 PROCEDURE — 49446 CHANGE G-TUBE TO G-J PERC: CPT

## 2025-05-28 PROCEDURE — 99233 SBSQ HOSP IP/OBS HIGH 50: CPT | Mod: 24,25

## 2025-05-28 RX ORDER — SODIUM/POT/MAG/CALC/CHLOR/ACET 35-20-5MEQ
1 VIAL (ML) INTRAVENOUS
Refills: 0 | Status: DISCONTINUED | OUTPATIENT
Start: 2025-05-28 | End: 2025-05-28

## 2025-05-28 RX ORDER — FUROSEMIDE 10 MG/ML
20 INJECTION INTRAMUSCULAR; INTRAVENOUS ONCE
Refills: 0 | Status: COMPLETED | OUTPATIENT
Start: 2025-05-28 | End: 2025-05-28

## 2025-05-28 RX ORDER — I.V. FAT EMULSION 20 G/100ML
0.5 EMULSION INTRAVENOUS
Qty: 50 | Refills: 0 | Status: DISCONTINUED | OUTPATIENT
Start: 2025-05-28 | End: 2025-05-28

## 2025-05-28 RX ADMIN — I.V. FAT EMULSION 20.8 GM/KG/DAY: 20 EMULSION INTRAVENOUS at 20:01

## 2025-05-28 RX ADMIN — IPRATROPIUM BROMIDE AND ALBUTEROL SULFATE 3 MILLILITER(S): .5; 2.5 SOLUTION RESPIRATORY (INHALATION) at 14:10

## 2025-05-28 RX ADMIN — IPRATROPIUM BROMIDE AND ALBUTEROL SULFATE 3 MILLILITER(S): .5; 2.5 SOLUTION RESPIRATORY (INHALATION) at 07:42

## 2025-05-28 RX ADMIN — Medication 40 MILLIGRAM(S): at 12:48

## 2025-05-28 RX ADMIN — IPRATROPIUM BROMIDE AND ALBUTEROL SULFATE 3 MILLILITER(S): .5; 2.5 SOLUTION RESPIRATORY (INHALATION) at 19:54

## 2025-05-28 RX ADMIN — Medication 4 MILLILITER(S): at 14:10

## 2025-05-28 RX ADMIN — Medication 4 MILLILITER(S): at 19:54

## 2025-05-28 RX ADMIN — NYSTATIN 1 APPLICATION(S): 100000 CREAM TOPICAL at 17:16

## 2025-05-28 RX ADMIN — Medication 58 EACH: at 20:01

## 2025-05-28 RX ADMIN — Medication 4 MILLILITER(S): at 07:47

## 2025-05-28 RX ADMIN — ENOXAPARIN SODIUM 40 MILLIGRAM(S): 100 INJECTION SUBCUTANEOUS at 10:36

## 2025-05-28 RX ADMIN — FUROSEMIDE 20 MILLIGRAM(S): 10 INJECTION INTRAMUSCULAR; INTRAVENOUS at 10:36

## 2025-05-28 RX ADMIN — IPRATROPIUM BROMIDE AND ALBUTEROL SULFATE 3 MILLILITER(S): .5; 2.5 SOLUTION RESPIRATORY (INHALATION) at 02:39

## 2025-05-28 NOTE — PROGRESS NOTE ADULT - ASSESSMENT
74y old Female with above PMHx, s/p exlap and second look laparotomy with billroth II and placement of J tube    Plan:  - plan for endo for GI exchange of J tube to GJ tube  - monitor respiratory status  - monitor vik drain output, bilious  - f/u ID on abx  - f/u palliative for GOC re-eval  - TPN  - continue abx   - rest of care per SICU   - Monitor uop    SURGERY SPECTRA: [id number]

## 2025-05-28 NOTE — PROGRESS NOTE ADULT - SUBJECTIVE AND OBJECTIVE BOX
GENERAL SURGERY PROGRESS NOTE    Patient: PATRICIA SPRAGUE , 74y ([birth year])Female   MRN: [id number]  Location: [place] [address] A  Visit: 05-13-25 Inpatient  Date: 05-28-25 @ 09:16    Hospital Day #: 16  Post-Op Day #: 11    Procedure/Dx/Injuries: s/p ex lap and 2nd look    Events of past 24 hours: Planning to go to [facility] with GI today for GJ placement.     PAST MEDICAL & SURGICAL HISTORY:  HTN (hypertension)  Diabetes mellitus  Obesity  Gastroesophageal reflux disease  Active asthma  Generalized OA  Afib  H/O hernia repair  2011 and revised in 2013  History of cholecystectomy  Status post debridement  H/O tracheostomy  S/P gastrostomy  S/P jejunostomy        Vitals:   T(F): 97.6 (05-28-25 @ 04:00), Max: 97.8 (05-27-25 @ 20:00)  HR: 81 (05-28-25 @ 07:00)  BP: 116/61 (05-28-25 @ 04:00)  RR: 20 (05-28-25 @ 07:00)  SpO2: 100% (05-28-25 @ 07:00)      Diet, NPO      Fluids:     I & O's:    05-27-25 @ 07:01  -  05-28-25 @ 07:00  --------------------------------------------------------  IN:    Fat Emulsion (Fish Oil &amp; Plant Based) 20% Infusion: 41.6 mL    IV PiggyBack: 100 mL    IV PiggyBack: 50 mL    TPN (Total Parenteral Nutrition): 1395.6 mL  Total IN: 1587.2 mL    OUT:    Bulb (mL): 95 mL    Indwelling Catheter - Urethral (mL): 2425 mL    Nasogastric/Oral tube (mL): 350 mL    Oral Fluid: 0 mL  Total OUT: 2870 mL    Total NET: -1282.8 mL    PHYSICAL EXAM:  GENERAL: NAD, well-appearing  CHEST/LUNG: Equal chest rise bilaterally  HEART: Regular rate and rhythm  ABDOMEN: Soft, Nontender, Nondistended; dressings intact, vik in place.   EXTREMITIES:  No clubbing, cyanosis, or edema    MEDICATIONS  (STANDING):  acetaminophen   IVPB .. 1000 milliGRAM(s) IV Intermittent once  albuterol/ipratropium for Nebulization 3 milliLiter(s) Nebulizer every 6 hours  chlorhexidine 2% Cloths 1 Application(s) Topical <User Schedule>  enoxaparin Injectable 40 milliGRAM(s) SubCutaneous every 24 hours  fluconAZOLE IVPB 200 milliGRAM(s) IV Intermittent every 24 hours  nystatin Powder 1 Application(s) Topical two times a day  pantoprazole  Injectable 40 milliGRAM(s) IV Push daily  Parenteral Nutrition - Adult 1 Each (58 mL/Hr) TPN Continuous <Continuous>  sodium chloride 3%  Inhalation 4 milliLiter(s) Inhalation every 6 hours    MEDICATIONS  (PRN):  sodium chloride 0.9% lock flush 10 milliLiter(s) IV Push every 1 hour PRN Pre/post blood products, medications, blood draw, and to maintain line patency      DVT PROPHYLAXIS: enoxaparin Injectable 40 milliGRAM(s) SubCutaneous every 24 hours    GI PROPHYLAXIS: pantoprazole  Injectable 40 milliGRAM(s) IV Push daily    ANTICOAGULATION:   ANTIBIOTICS:  fluconAZOLE IVPB 200 milliGRAM(s)      LAB/STUDIES:  Labs:  CAPILLARY BLOOD GLUCOSE      POCT Blood Glucose.: 124 mg/dL (27 May 2025 18:51)                          7.3    8.42  )-----------( 183      ( 27 May 2025 22:35 )             23.0       Auto Immature Granulocyte %: 0.8 % (05-27-25 @ 22:35)    05-28    144  |  111[H]  |  44[H]  ----------------------------<  116[H]  4.7   |  23  |  0.7      Calcium: 9.9 mg/dL (05-28-25 @ 04:46)      LFTs:             4.5  | 0.8  | 17       ------------------[112     ( 27 May 2025 05:20 )  2.7  | 0.3  | 8           Coags:     11.90  ----< 1.01    ( 27 May 2025 22:35 )     33.7      Urinalysis Basic - ( 28 May 2025 04:46 )    Color: x / Appearance: x / SG: x / pH: x  Gluc: 116 mg/dL / Ketone: x  / Bili: x / Urobili: x   Blood: x / Protein: x / Nitrite: x   Leuk Esterase: x / RBC: x / WBC x   Sq Epi: x / Non Sq Epi: x / Bacteria: x      IMAGING:  n/a    ACCESS/ DEVICES:  [x ] Peripheral IV

## 2025-05-28 NOTE — PROGRESS NOTE ADULT - ASSESSMENT
Assessment	  74F found to have perforated peptic ulcer now s/p ex lap, distal antrectomy, abthera placement requiring vasopressors. RTOR 5/16 s/p second look laparotomy, Billroth II reconstruction, gastrojejunostomy tube replaced with a jejunal feeding tube, abdomen closed at the end of the case.     NEUROLOGICAL:  #Acute pain  - IV APAP while NPO   - Dilaudid 0.25 PRN    RESPIRATORY:   #Respiratory failure, s/p tracheostomy (11/24)    - now with 8 portex cuffed trach (switched in OR on 5/14)    - tolerating T- piece    - continue duonebs    - lasix for diuresis given daily, last dose 5/26   #Decreased small bibasilar opacities/atelectasis, right greater than left on CT  - 5/16: s/p deep suctioning by anesthesia w/ copious secretions   #h/o asthma  - atrovent and albuterol   #Increased secretions  - 3% inhaled NS  # COVID+ on admission --- resolved  - s/p Remdesivir  - off contact precautions 5/24  #Activity   - increase as tolerated     CARDS:   #Nonsustained Vtach - self resolved   - cards consult: management of septic shock. Start short acting AC for afib. Start beta blockers once off pressors   #hypotension 2/2 sepsis   - off vasopressors since 5/19  -25% 50cc x 1 for SBP 90s, improved to 120s   - MAP >65  - Holding midodrine 10 mg Q8h while NPO  #hx afib  - transiently was in AFIB slow ventricular response (HR 40s-60s)  - holding home Xarelto 15 QD  #hx HTN  - holding home losartan 100QD  #elevated troponin  - 143 > 84, no longer trending  #TTE 11/24: EF 71%. Mild-mod AS. Trivial pericardial effusion.   - Echo 5/15: EF 30-35%, multiple left ventricular motion wall abnormalities. Possible Takotsubo vs ischemic. Global left ventricular systolic function.     GASTROINTESTINAL/NUTRITION:   #perforated prepyloric gastric ulcer s/p exploratory laparotomy antrectomy and D1 stapled off with temporary abdominal closure and abthera in place  - 5/16: RTOR s/p bilroth 2, J tube placement, RLQ maliha drain by anastomosis extending to duodenal stump  - 5/18: BULMARO drain w/ bilious output > pt made strict NPO, BULMARO drain bilirubin 9.7, LFTs WNL  #Diet, NPO strict  - 5/23: s/p PICC by IR, started on TPN  - J tube study performed 5/21 --> GI consulted for exchange of J for GJ  -PEG-J tube adjustment tomorrow by GI   - aspiration precautions, HOB 30  #GI Prophylaxis    - protonix 40mg QD IV  #Bowel regimen    -holding    -last bowel movement 5/25 PM    /RENAL:   #urine output in critically ill    -chronic indwelling ya > new ya exchanged 5/17  #metabolic acidosis, resolved  #NOLA (baseline creatine 0.6), improving   - nephrology consulted > no RRT at this time  #Hx of solitary kidney    Labs:   BUN/Cr- 42/0.6  -->,  44/0.7  -->    [05-27 @ 22:35]Na  143 // K  4.5 // Mg  1.9 // Phos  3.2  [05-27 @ 05:20]Na  146 // K  4.5 // Mg  2.0 // Phos  3.0      HEME/ONC:   #DVT prophylaxis    -Lovenox    -SCDs  #anemia  - 1pRBC 5/17 to help wean off vasopressors    Labs: Hb/Hct:  7.4/23.4  (05-27 @ 05:20)  -->,  7.3/23.0  (05-27 @ 22:35)  -->                      Plts:  193  -->,  183  -->                 PTT/INR:  34.1/1.03  --->,  33.7/1.01  --->           ID:  #perforated duodenal ulcer  - Meropenem (5/16 - )  - Nystatin powder for groin rash (5/15 - )  Completed antibiotics:  - zosyn (renal dosing) (5/14 - 5/16)  - caspofungin per primary team (5/14 - 5/20)  - Fluconazole 200mg q24 (end date 5/24)  #COVID positive on admission --- resolved    - completed remdesivir 2 day course    - completed isolation precautions 5/23  #Cultures    - 5/13: blood culture: NGTD    - 5/13: blood culture: NGTD    - 5/13: urine culture: E Coli  - ID following  WBC- 10.92  --->>,  9.26  --->>,  8.42  --->>  Temp trend- 24hrs T(F): 97.8 (05-27 @ 20:00), Max: 97.8 (05-27 @ 20:00)  Current antibiotics-fluconAZOLE IVPB 200 every 24 hours      ENDOCRINE:  #Maintain euglycemia    -FSG q6 while NPO    -Glucose goal 140-180. If above 180, will start corrective insulin sliding scale  #hypercalcemia  - HOLDING  home cinacalcet 30mg bid    MSK:  #Activity - increase as tolerated   #hx osteoporosis  - holding home Alendronate 70mg weekly on Thursdays while NPO    SKIN:  #DTI screen  -Sacral and bilateral gluteal fold friction wounds per wound care rn  -wound c/s placed    - will continue to monitor daily skin changes    PALLIATIVE:  Currently full code, palliative consult; readdress goals 5/27      LINES/DRAINS:  PIV, Midline Catheter x2 (5/20-) Ya (replaced 5/17), J tube (5/16), 8 cuffed portex trach    Discontinued lines: R radial arterial line (5/14- 5/22), LIJ CVC (5/14- 5/20), PICC  ADVANCED DIRECTIVES:  Full Code    HCP/Emergency Contact- daughter Cecilia [phone]  INDICATION FOR SICU/SDU:  perforated peptic ulcer; mechanical ventilation/vasopressors     DISPO:  SICU. Case discussed with attending Dr. Peters

## 2025-05-28 NOTE — CHART NOTE - NSCHARTNOTEFT_GEN_A_CORE
GI NUTRITION SUPPORT TEAM  -  PROGRESS NOTE     Interval Events:        REVIEW OF SYSTEMS:  Negative except as noted above.     VITALS:  T(F): 97.6 (05-28 @ 04:00), Max: 97.6 (05-28 @ 04:00)  HR: 81 (05-28 @ 07:00) (71 - 89)  BP: 116/61 (05-28 @ 04:00) (108/56 - 116/61)  RR: 20 (05-28 @ 07:00) (11 - 26)  SpO2: 100% (05-28 @ 07:00) (92% - 100%)    HEIGHT/WEIGHT/BMI:   Height (cm): 167 (05-23), 167.6 (04-11), 167.6 (11-20)  Weight (kg): 82 (05-27), 82.1 (04-14), 90.2 (11-20)  BMI (kg/m2): 29.4 (05-27), 29.4 (05-23), 29.2 (04-14), 32.1 (04-11)    I/Os:     05-27-25 @ 07:01  -  05-28-25 @ 07:00  --------------------------------------------------------  IN:    Fat Emulsion (Fish Oil &amp; Plant Based) 20% Infusion: 41.6 mL    IV PiggyBack: 100 mL    IV PiggyBack: 50 mL    TPN (Total Parenteral Nutrition): 1395.6 mL  Total IN: 1587.2 mL    OUT:    Bulb (mL): 95 mL    Indwelling Catheter - Urethral (mL): 2425 mL    Nasogastric/Oral tube (mL): 350 mL    Oral Fluid: 0 mL  Total OUT: 2870 mL    Total NET: -1282.8 mL      MEDICATIONS:   acetaminophen   IVPB .. 1000 milliGRAM(s) IV Intermittent once  albuterol/ipratropium for Nebulization 3 milliLiter(s) Nebulizer every 6 hours  chlorhexidine 2% Cloths 1 Application(s) Topical <User Schedule>  enoxaparin Injectable 40 milliGRAM(s) SubCutaneous every 24 hours  fluconAZOLE IVPB 200 milliGRAM(s) IV Intermittent every 24 hours  nystatin Powder 1 Application(s) Topical two times a day  pantoprazole  Injectable 40 milliGRAM(s) IV Push daily  Parenteral Nutrition - Adult 1 Each (58 mL/Hr) TPN Continuous <Continuous>  sodium chloride 0.9% lock flush 10 milliLiter(s) IV Push every 1 hour PRN  sodium chloride 3%  Inhalation 4 milliLiter(s) Inhalation every 6 hours    LABS:                         7.3    8.42  )-----------( 183      ( 27 May 2025 22:35 )             23.0     144  |  111[H]  |  44[H]  ----------------------------<  116[H]          (05-28-25 @ 04:46)  4.7   |  23  |  0.7    Ca    9.9          (05-28-25 @ 04:46)  Phos  3.2         (05-28-25 @ 04:46)  Mg     1.9         (05-28-25 @ 04:46)    TPro  4.5[L]  /  Alb  2.7[L]  /  TBili  0.8  /  DBili  0.3  /  AST  17  /  ALT  8   /  AlkPhos  112       05-27-25 @ 05:20    Triglycerides, Serum: 111 mg/dL (05-24 @ 06:08)  Vitamin D, 25-Hydroxy: 30 ng/mL (04-10 @ 10:32)    Blood Glucose (Past 24 hours):  124 mg/dL (05-27 @ 18:51)    DIET:   Diet, NPO (05-20-25 @ 23:37) [Active]    Parenteral Nutrition - Adult 1 Each (58 mL/Hr) TPN Continuous <Continuous>, 05-27-25 @ 20:00, 20:00, Stop order after: 1 Days       ASSESSMENT  81F PMHx HTN, a-fib (on xarelto), solitary kidney, DM, GERD, prior open cholecystectomy c/b suture granulomas s/p 2019 abdominal wall debridements by Dr. Banda, respiratory distress with prolonged ventilation during 11/2024 admission, s/p 11/19 tracheostomy and gastrostomy and feeding jejunostomy tube by Dr. Champion, ya catheter dependence who presented to the ED from NH on 5/13 with 4 days progressively worsening abdominal pain. Found to have perforated peptic ulcer. s/p exploratory laparotomy antrectomy and D1 stapled off with temporary abdominal closure and abthera in place with RTOR 5/16 s/p Billroth II 40cm from LOT with gastrojejunostomy tube placed with jejunal feeding tube (distal tip in jejunum, no gastric access), RLQ Gregorio drain by anastomosis extending to duodenal stump.     - perforated prepyloric gastric ulcer, 5/16 s/p exploratory laparotomy antrectomy and D1 stapled off with temporary abdominal closure and abthera in place  - RTOR 5/16 s/p Billroth II 40cm from LOT with gastrojejunostomy tube placed with jejunal feeding tube (distal tip in jejunum, no gastric access), RLQ Gregorio drain by anastomosis extending to duodenal stump  - suspected bile leak, bilious output BULMARO drain, bilirubin 9.7   - jejunal tube in stomach, intolerance of gastric feeds  - prolonged NPO/inadequate nutrient intake (700mL total volume) X 11d- PN initiated 5/23    Est nutrient needs: ASPEN/SCCM obesity critical pt guidelines: [phone] kcals (11-14kcals/kg-Actual/bed Wt 5/20 89kg), 118g protein (2g/kg IBW of 59kg), fluid needs reassessed daily      PLAN  - cont PN via PICC tonight- 110g AA, 175g dextrose @58ml/hr; 50g SMOF lipid tonight (QOD, avg 25g/d)  - bmp, phos, mg, in am 5/29  - sterile central line care per protocol  - once GJ tube replaced and ok to use jejunal port for feeds start Pivot 1.5 @ 10ml/hr   - will follow GI NUTRITION SUPPORT TEAM  -  PROGRESS NOTE     Interval Events:    NPO with TPN infusing via LUE PICC day #5. GJ tube placement today    REVIEW OF SYSTEMS:  Negative except as noted above.     VITALS:  T(F): 97.6 (05-28 @ 04:00), Max: 97.6 (05-28 @ 04:00)  HR: 81 (05-28 @ 07:00) (71 - 89)  BP: 116/61 (05-28 @ 04:00) (108/56 - 116/61)  RR: 20 (05-28 @ 07:00) (11 - 26)  SpO2: 100% (05-28 @ 07:00) (92% - 100%)    HEIGHT/WEIGHT/BMI:   Height (cm): 167 (05-23), 167.6 (04-11), 167.6 (11-20)  Weight (kg): 82 (05-27), 82.1 (04-14), 90.2 (11-20)  BMI (kg/m2): 29.4 (05-27), 29.4 (05-23), 29.2 (04-14), 32.1 (04-11)    I/Os:     05-27-25 @ 07:01  -  05-28-25 @ 07:00  --------------------------------------------------------  IN:    Fat Emulsion (Fish Oil &amp; Plant Based) 20% Infusion: 41.6 mL    IV PiggyBack: 100 mL    IV PiggyBack: 50 mL    TPN (Total Parenteral Nutrition): 1395.6 mL  Total IN: 1587.2 mL    OUT:    Bulb (mL): 95 mL    Indwelling Catheter - Urethral (mL): 2425 mL    Nasogastric/Oral tube (mL): 350 mL    Oral Fluid: 0 mL  Total OUT: 2870 mL    Total NET: -1282.8 mL      MEDICATIONS:   acetaminophen   IVPB .. 1000 milliGRAM(s) IV Intermittent once  albuterol/ipratropium for Nebulization 3 milliLiter(s) Nebulizer every 6 hours  chlorhexidine 2% Cloths 1 Application(s) Topical <User Schedule>  enoxaparin Injectable 40 milliGRAM(s) SubCutaneous every 24 hours  fluconAZOLE IVPB 200 milliGRAM(s) IV Intermittent every 24 hours  nystatin Powder 1 Application(s) Topical two times a day  pantoprazole  Injectable 40 milliGRAM(s) IV Push daily  Parenteral Nutrition - Adult 1 Each (58 mL/Hr) TPN Continuous <Continuous>  sodium chloride 0.9% lock flush 10 milliLiter(s) IV Push every 1 hour PRN  sodium chloride 3%  Inhalation 4 milliLiter(s) Inhalation every 6 hours    LABS:                         7.3    8.42  )-----------( 183      ( 27 May 2025 22:35 )             23.0     144  |  111[H]  |  44[H]  ----------------------------<  116[H]          (05-28-25 @ 04:46)  4.7   |  23  |  0.7    Ca    9.9          (05-28-25 @ 04:46)  Phos  3.2         (05-28-25 @ 04:46)  Mg     1.9         (05-28-25 @ 04:46)    TPro  4.5[L]  /  Alb  2.7[L]  /  TBili  0.8  /  DBili  0.3  /  AST  17  /  ALT  8   /  AlkPhos  112       05-27-25 @ 05:20    Triglycerides, Serum: 111 mg/dL (05-24 @ 06:08)  Vitamin D, 25-Hydroxy: 30 ng/mL (04-10 @ 10:32)    Blood Glucose (Past 24 hours):  124 mg/dL (05-27 @ 18:51)    DIET:   Diet, NPO (05-20-25 @ 23:37) [Active]    Parenteral Nutrition - Adult 1 Each (58 mL/Hr) TPN Continuous <Continuous>, 05-27-25 @ 20:00, 20:00, Stop order after: 1 Days       ASSESSMENT  81F PMHx HTN, a-fib (on xarelto), solitary kidney, DM, GERD, prior open cholecystectomy c/b suture granulomas s/p 2019 abdominal wall debridements by Dr. Banda, respiratory distress with prolonged ventilation during 11/2024 admission, s/p 11/19 tracheostomy and gastrostomy and feeding jejunostomy tube by Dr. Champion, ya catheter dependence who presented to the ED from NH on 5/13 with 4 days progressively worsening abdominal pain. Found to have perforated peptic ulcer. s/p exploratory laparotomy antrectomy and D1 stapled off with temporary abdominal closure and abthera in place with RTOR 5/16 s/p Billroth II 40cm from LOT with gastrojejunostomy tube placed with jejunal feeding tube (distal tip in jejunum, no gastric access), RLQ Gregorio drain by anastomosis extending to duodenal stump.     - perforated prepyloric gastric ulcer, 5/16 s/p exploratory laparotomy antrectomy and D1 stapled off with temporary abdominal closure and abthera in place  - RTOR 5/16 s/p Billroth II 40cm from LOT with gastrojejunostomy tube placed with jejunal feeding tube (distal tip in jejunum, no gastric access), RLQ Gregorio drain by anastomosis extending to duodenal stump  - suspected bile leak, bilious output BULMARO drain, bilirubin 9.7   - jejunal tube in stomach, intolerance of gastric feeds  - prolonged NPO/inadequate nutrient intake (700mL total volume) X 11d- PN initiated 5/23    Est nutrient needs: ASPEN/SCCM obesity critical pt guidelines: [phone] kcals (11-14kcals/kg-Actual/bed Wt 5/20 89kg), 118g protein (2g/kg IBW of 59kg), fluid needs reassessed daily      PLAN  - cont PN via PICC tonight- 110g AA, 175g dextrose @58ml/hr; 50g SMOF lipid tonight (QOD, avg 25g/d)  - bmp, phos, mg, in am 5/29  - sterile central line care per protocol  - once GJ tube replaced and ok to use jejunal port for feeds start Pivot 1.5 @ 10ml/hr (goal 47ml/hr X 18hrs with Prosource TF X 3/d)  - will follow

## 2025-05-28 NOTE — CHART NOTE - NSCHARTNOTEFT_GEN_A_CORE
SICU ANESTHESIA TRANSFER NOTE      Procedure: Partial antrectomy    US Doppler guided insertion of central venous catheter    Laparotomy, second look, with packing removal    Billroth II operation    Replacement of gastrojejunostomy tube      Post op diagnosis:  Perforated peptic ulcer        ____  Intubated  TV:______       Rate: ______      FiO2: ______    _x___  Patent Airway    _x___  Full return of protective reflexes    _x___  Full recovery from anesthesia / back to baseline status    Vitals:    See anesthesia record      Mental Status:  _x___ Awake   _____ Alert   _____ Drowsy   _____ Sedated    Nausea/Vomiting:  _x___  NO       ______Yes,   See Post - Op Orders         Pain Scale (0-10):  __0___    Treatment: _x___ None    ____ See Post - Op/PCA Orders    Post - Operative Fluids:   ____ Oral   __x__ See Post - Op Orders    Plan: Discharge:   ____Home       _____Floor     __x___Critical Care    _____  Other:_________________    Comments: Patient transferred to SICU on full monitors, breathing through tracheostomy on venturi mask, VSS throughout the transfer. Reoprt given to SICU RN.  No anesthesia issues or complications noted.

## 2025-05-28 NOTE — PROGRESS NOTE ADULT - SUBJECTIVE AND OBJECTIVE BOX
PATRICIA SPRAGUE   [id number]   [birth year]    74yF  ============================================================   DATE OF INITIAL SICU/SDU CONSULT: 05-13-25    INDICATION FOR SICU CONSULT:  perforated duodenal ulcer      SICU COURSE EVENTS :  05-13 - admitted to SICU service  05-14 - s/p OR for exploratory laparotomy, started remdesivir for covid +, echo ordered  5/15 - Echo showing EF 30-35%, cardiology consulted. Pending RTOR. Remains on vasopressors, weaning.  5/16: S/p Exploratory laparotomy, bilroth 2, J tube placement. Episode of wheezing. Changed to meropenem per ID. AFIB slow ventricular response HR 40s-60s, remains on low dose pressors   5/17: Tolerating T piece, trickle feed started via J tube, s/p 1u PRBC to wean off levophed, remains on vasopressin, IVL  5/18: Midodrine 10mg q8 started, weaned off levophed. BULMARO drain bilious output > strict NPO, f/u fluid composition   5/19: CTAP - No evidence of drainable fluid collection.   5/20: Trickle feeds restarted but discontinued 2/2 high gastric residual, NGT kep to suction. Caspofungin discontinued, narrowed to diclucan.  5/21: GI consulted for GJ replacement  5/23: PICC line placed with IR, started TPN in PM, IVL  5/24: diuresis with lasix. albumin 55 250cc x 1 given overnight.  5/25- 25% 50cc albumin x 1 given for SBP 90s, improved to 120s. Diuresis with 20mg lasix x 1, >1.8L response. IPV increased to Q6 hours.  5/26: Diuresis with 20mg lasix, given 25% 50cc, >1L response.        24HOUR EVENTS  5/27  NIGHT   - PM rounds: , AFIB HR 70, saturating 100% on T piece, comfortable, BULMARO drain yellow   - Stat labs   - Pre-op for GI tomorrow    Day   - chest PT   - lasix 20 IVP   - GJ replacement at bedside tomorrow  - f/u ID for meropenem duration; no concern for leak d/c abx  - repeat CT scan tomorrow with chest     [X] A ten-point review of systems was negative except as expressed in note.  [X] History was obtained from patient. If unable to participate in their care, history obtained from review of the chart and collateral sources of information.  ============================================================   · Assessment	  74F found to have perforated peptic ulcer now s/p ex lap, distal antrectomy, abthera placement requiring vasopressors. RTOR 5/16 s/p second look laparotomy, Billroth II reconstruction, gastrojejunostomy tube replaced with a jejunal feeding tube, abdomen closed at the end of the case.     NEUROLOGICAL:  #Acute pain  - IV APAP while NPO   - Dilaudid 0.25 PRN    RESPIRATORY:   #Respiratory failure, s/p tracheostomy (11/24)    - now with 8 portex cuffed trach (switched in OR on 5/14)    - tolerating T- piece    - continue duonebs    - lasix for diuresis given daily, last dose 5/26   #Decreased small bibasilar opacities/atelectasis, right greater than left on CT  - 5/16: s/p deep suctioning by anesthesia w/ copious secretions   #h/o asthma  - atrovent and albuterol   #Increased secretions  - 3% inhaled NS  # COVID+ on admission --- resolved  - s/p Remdesivir  - off contact precautions 5/24  #Activity   - increase as tolerated     CARDS:   #Nonsustained Vtach - self resolved   - cards consult: management of septic shock. Start short acting AC for afib. Start beta blockers once off pressors   #hypotension 2/2 sepsis   - off vasopressors since 5/19  -25% 50cc x 1 for SBP 90s, improved to 120s   - MAP >65  - Holding midodrine 10 mg Q8h while NPO  #hx afib  - transiently was in AFIB slow ventricular response (HR 40s-60s)  - holding home Xarelto 15 QD  #hx HTN  - holding home losartan 100QD  #elevated troponin  - 143 > 84, no longer trending  #TTE 11/24: EF 71%. Mild-mod AS. Trivial pericardial effusion.   - Echo 5/15: EF 30-35%, multiple left ventricular motion wall abnormalities. Possible Takotsubo vs ischemic. Global left ventricular systolic function.     GASTROINTESTINAL/NUTRITION:   #perforated prepyloric gastric ulcer s/p exploratory laparotomy antrectomy and D1 stapled off with temporary abdominal closure and abthera in place  - 5/16: RTOR s/p bilroth 2, J tube placement, RLQ maliha drain by anastomosis extending to duodenal stump  - 5/18: BULMARO drain w/ bilious output > pt made strict NPO, BULMARO drain bilirubin 9.7, LFTs WNL  #Diet, NPO strict  - 5/23: s/p PICC by IR, started on TPN  - J tube study performed 5/21 --> GI consulted for exchange of J for GJ  -PEG-J tube adjustment tomorrow by GI   - aspiration precautions, HOB 30  #GI Prophylaxis    - protonix 40mg QD IV  #Bowel regimen    -holding    -last bowel movement 5/25 PM    /RENAL:   #urine output in critically ill    -chronic indwelling ya > new ya exchanged 5/17  #metabolic acidosis, resolved  #NOLA (baseline creatine 0.6), improving   - nephrology consulted > no RRT at this time  #Hx of solitary kidney    Labs:   BUN/Cr- 42/0.6  -->,  44/0.7  -->    [05-27 @ 22:35]Na  143 // K  4.5 // Mg  1.9 // Phos  3.2  [05-27 @ 05:20]Na  146 // K  4.5 // Mg  2.0 // Phos  3.0      HEME/ONC:   #DVT prophylaxis    -Lovenox    -SCDs  #anemia  - 1pRBC 5/17 to help wean off vasopressors    Labs: Hb/Hct:  7.4/23.4  (05-27 @ 05:20)  -->,  7.3/23.0  (05-27 @ 22:35)  -->                      Plts:  193  -->,  183  -->                 PTT/INR:  34.1/1.03  --->,  33.7/1.01  --->           ID:  #perforated duodenal ulcer  - Meropenem (5/16 - )  - Nystatin powder for groin rash (5/15 - )  Completed antibiotics:  - zosyn (renal dosing) (5/14 - 5/16)  - caspofungin per primary team (5/14 - 5/20)  - Fluconazole 200mg q24 (end date 5/24)  #COVID positive on admission --- resolved    - completed remdesivir 2 day course    - completed isolation precautions 5/23  #Cultures    - 5/13: blood culture: NGTD    - 5/13: blood culture: NGTD    - 5/13: urine culture: E Coli  - ID following  WBC- 10.92  --->>,  9.26  --->>,  8.42  --->>  Temp trend- 24hrs T(F): 97.8 (05-27 @ 20:00), Max: 97.8 (05-27 @ 20:00)  Current antibiotics-fluconAZOLE IVPB 200 every 24 hours      ENDOCRINE:  #Maintain euglycemia    -FSG q6 while NPO    -Glucose goal 140-180. If above 180, will start corrective insulin sliding scale  #hypercalcemia  - HOLDING  home cinacalcet 30mg bid    MSK:  #Activity - increase as tolerated   #hx osteoporosis  - holding home Alendronate 70mg weekly on Thursdays while NPO    SKIN:  #DTI screen  -Sacral and bilateral gluteal fold friction wounds per wound care rn  -wound c/s placed    - will continue to monitor daily skin changes    PALLIATIVE:  Currently full code, palliative consult; readdress goals 5/27      LINES/DRAINS:  PIV, Midline Catheter x2 (5/20-) Ya (replaced 5/17), J tube (5/16), 8 cuffed portex trach    Discontinued lines: R radial arterial line (5/14- 5/22), LIJ CVC (5/14- 5/20), PICC  ADVANCED DIRECTIVES:  Full Code    HCP/Emergency Contact- daughter Cecilia [phone]  INDICATION FOR SICU/SDU:  perforated peptic ulcer; mechanical ventilation/vasopressors     DISPO:  SICU. Case discussed with attending Dr. Peters       PATRICIA SPRAGUE   [id number]   [birth year]    74yF  ============================================================   DATE OF INITIAL SICU/SDU CONSULT: 25    INDICATION FOR SICU CONSULT:  perforated duodenal ulcer      SICU COURSE EVENTS :   - admitted to SICU service   - s/p OR for exploratory laparotomy, started remdesivir for covid +, echo ordered  5/15 - Echo showing EF 30-35%, cardiology consulted. Pending RTOR. Remains on vasopressors, weaning.  : S/p Exploratory laparotomy, bilroth 2, J tube placement. Episode of wheezing. Changed to meropenem per ID. AFIB slow ventricular response HR 40s-60s, remains on low dose pressors   : Tolerating T piece, trickle feed started via J tube, s/p 1u PRBC to wean off levophed, remains on vasopressin, IVL  : Midodrine 10mg q8 started, weaned off levophed. BULMARO drain bilious output > strict NPO, f/u fluid composition   : CTAP - No evidence of drainable fluid collection.   : Trickle feeds restarted but discontinued 2/2 high gastric residual, NGT kep to suction. Caspofungin discontinued, narrowed to diclucan.  : GI consulted for GJ replacement  : PICC line placed with IR, started TPN in PM, IVL  : diuresis with lasix. albumin 55 250cc x 1 given overnight.  - 25% 50cc albumin x 1 given for SBP 90s, improved to 120s. Diuresis with 20mg lasix x 1, >1.8L response. IPV increased to Q6 hours.  : Diuresis with 20mg lasix, given 25% 50cc, >1L response.        24HOUR EVENTS    NIGHT   - PM rounds: , AFIB HR 70, saturating 100% on T piece, comfortable, BULMARO drain yellow   - Stat labs   - Pre-op for GI tomorrow    Day   - chest PT   - lasix 20 IVP   - GJ replacement at bedside tomorrow  - f/u ID for meropenem duration; no concern for leak d/c abx  - repeat CT scan tomorrow with chest     [X] A ten-point review of systems was negative except as expressed in note.  [X] History was obtained from patient. If unable to participate in their care, history obtained from review of the chart and collateral sources of information.  ============================================================   Daily     Daily Weight in k.2 (28 May 2025 04:00)    Diet, NPO (25 @ 23:37)      CURRENT MEDS:  Neurologic Medications  acetaminophen   IVPB .. 1000 milliGRAM(s) IV Intermittent once    Respiratory Medications  albuterol/ipratropium for Nebulization 3 milliLiter(s) Nebulizer every 6 hours  sodium chloride 3%  Inhalation 4 milliLiter(s) Inhalation every 6 hours    Cardiovascular Medications    Gastrointestinal Medications  lipid, fat emulsion (Fish Oil and Plant Based) 20% Infusion 0.5045 Gm/kG/Day IV Continuous <Continuous>  pantoprazole  Injectable 40 milliGRAM(s) IV Push daily  Parenteral Nutrition - Adult 1 Each TPN Continuous <Continuous>  Parenteral Nutrition - Adult 1 Each TPN Continuous <Continuous>  sodium chloride 0.9% lock flush 10 milliLiter(s) IV Push every 1 hour PRN Pre/post blood products, medications, blood draw, and to maintain line patency    Genitourinary Medications    Hematologic/Oncologic Medications  enoxaparin Injectable 40 milliGRAM(s) SubCutaneous every 24 hours    Antimicrobial/Immunologic Medications    Endocrine/Metabolic Medications    Topical/Other Medications  chlorhexidine 2% Cloths 1 Application(s) Topical <User Schedule>  nystatin Powder 1 Application(s) Topical two times a day      ICU Vital Signs Last 24 Hrs  T(C): 36.3 (28 May 2025 08:00), Max: 36.6 (27 May 2025 20:00)  T(F): 97.3 (28 May 2025 08:00), Max: 97.8 (27 May 2025 20:00)  HR: 85 (28 May 2025 10:38) (66 - 89)  BP: 131/63 (28 May 2025 10:38) (108/56 - 131/63)  BP(mean): 90 (28 May 2025 10:38) (75 - 90)  ABP: --  ABP(mean): --  RR: 20 (28 May 2025 10:38) (11 - 26)  SpO2: 99% (28 May 2025 10:38) (92% - 100%)    O2 Parameters below as of 28 May 2025 10:38  Patient On (Oxygen Delivery Method): T-piece  O2 Flow (L/min): 7  O2 Concentration (%): 40        I&O's Summary    27 May 2025 07:01  -  28 May 2025 07:00  --------------------------------------------------------  IN: 1587.2 mL / OUT: 2870 mL / NET: -1282.8 mL    28 May 2025 07:01  -  28 May 2025 13:27  --------------------------------------------------------  IN: 174 mL / OUT: 530 mL / NET: -356 mL      I&O's Detail    27 May 2025 07:01  -  28 May 2025 07:00  --------------------------------------------------------  IN:    Fat Emulsion (Fish Oil &amp; Plant Based) 20% Infusion: 41.6 mL    IV PiggyBack: 100 mL    IV PiggyBack: 50 mL    TPN (Total Parenteral Nutrition): 1395.6 mL  Total IN: 1587.2 mL    OUT:    Bulb (mL): 95 mL    Indwelling Catheter - Urethral (mL): 2425 mL    Nasogastric/Oral tube (mL): 350 mL    Oral Fluid: 0 mL  Total OUT: 2870 mL    Total NET: -1282.8 mL      28 May 2025 07:01  -  28 May 2025 13:27  --------------------------------------------------------  IN:    TPN (Total Parenteral Nutrition): 174 mL  Total IN: 174 mL    OUT:    Bulb (mL): 0 mL    Indwelling Catheter - Urethral (mL): 330 mL    Nasogastric/Oral tube (mL): 200 mL    Oral Fluid: 0 mL  Total OUT: 530 mL    Total NET: -356 mL      PHYSICAL EXAM:    General/Neuro  RASS:             GCS:    11T = E   / V   / M      Deficits:   alert & oriented x 3, no focal deficits    Lungs:      clear to auscultation, Normal expansion/effort.     Cardiovascular : S1, S2.  Regular rate and rhythm.  Peripheral edema   Cardiac Rhythm: Normal Sinus Rhythm    GI: Abdomen soft, Non-tender, Non-distended.  BULMRAO drain draining yellow/ascitic fluid     Extremities: Extremities warm, pink, well-perfused. Palpable DP pulses bilaterally     Derm: Good skin turgor, no skin breakdown.      :       Burger catheter in place.    LABS:  POCT Blood Glucose.: 105 mg/dL (28 May 2025 11:46)  POCT Blood Glucose.: 124 mg/dL (27 May 2025 18:51)                          7.3    8.42  )-----------( 183      ( 27 May 2025 22:35 )             23.0           144  |  111[H]  |  44[H]  ----------------------------<  116[H]  4.7   |  23  |  0.7    Ca    9.9      28 May 2025 04:46  Phos  3.2       Mg     1.9         TPro  4.5[L]  /  Alb  2.7[L]  /  TBili  0.8  /  DBili  0.3  /  AST  17  /  ALT  8   /  AlkPhos  112        PT/INR - ( 27 May 2025 22:35 )   PT: 11.90 sec;   INR: 1.01 ratio         PTT - ( 27 May 2025 22:35 )  PTT:33.7 sec      Urinalysis Basic - ( 28 May 2025 04:46 )    Color: x / Appearance: x / SG: x / pH: x  Gluc: 116 mg/dL / Ketone: x  / Bili: x / Urobili: x   Blood: x / Protein: x / Nitrite: x   Leuk Esterase: x / RBC: x / WBC x   Sq Epi: x / Non Sq Epi: x / Bacteria: x

## 2025-05-28 NOTE — CHART NOTE - NSCHARTNOTEFT_GEN_A_CORE
S/p EGD with PEGJ  Normal mucosa in the whole esophagus.  	Bilious fluid was noted in the stomach and was adequately suctioned. The previously described PEG tube was in place and coiled in the stomach. .  	Gastric previous surgery.  	5mm clean based Gastric ulcer.  	The efferent and afferent limbs were intubated and examined carefully. The afferent limb was identified by the presence of bile. .  	The previously placed PEG tube was removed. The PEGJ tube was inserted through the existing stoma site. Using a 13mm clip, the distal thread of the PEGJ tube was captured. The tube was guided to the efferent limb. Once adequate positioning was identified, we clipped the thread of the PEGJ tube to the jejunal mucosa. The scope was withdrawn into the lumen of the stomach. The G balloon was inflated. Satisfactory positioning was confirmed and the scope was withdrawn.     Rec  OK to use PEGJ tube from GI perspective   Diet as per surgery   PPI BID   Will follow

## 2025-05-28 NOTE — PROGRESS NOTE ADULT - ATTENDING COMMENTS
Critical Care: [id number]   This patient has a high probability of sudden, clinically significant deterioration, which requires the highest level of physician preparedness to intervene urgently. I managed/supervised life or organ supporting interventions that required frequent physician assessment. I devoted my full attention in the ICU to the direct care of this patient for the period of time indicated below. Time I spent with the family or surrogate(s) is included only if the patient was incapable of providing the necessary information or participating in medical decision making. Time devoted to teaching and to any procedures I billed separately is not included.     PATRICIA SPRAGUE 74y Female admitted to perforated peptic ulcer now s/p ex lap, distal antrectomy, abthera placement requiring vasopressors. RTOR 5/16 s/p second look laparotomy, Billroth II reconstruction, gastrojejunostomy tube replaced with a jejunal feeding tube and abdominal wall closure.  duodenal stump leak, treating with antibiotics.   Patient is examined and evaluated at the bedside with SICU team. Treatment plan discussed with SICU team, nurses and primary team.   Chest X-ray and all relevant studies reviewed during rounds.  Will continue hemodynamic monitoring as per protocol in SICU.    Neuro: PRN pain meds, delirium precautions  CV: continue to monitor  Respiratory: chronic trach, bilateral pleural effusions, chest PT, PRN nebs, diurese, daily CXR  GI: GJ tube placement today, strict NPO, no anastomotic leak,   TPN for nutrition until TF restarted with GJ tube and at goal  Renal: history of solitary kidney, NOLA, now improving  diuresing with goal of net even and or negative   ID: s/p IV antibiotics, stopped on 5/27/25  continue with IV antibiotics   Heme: acute blood loss anemia, goal Hb > 7  Endocrine: ISS  PV: follow pulse exam  Skin: decub precautions  DVT Prophylaxis:  SCDs  chemical DVT ppx  Stress Gastritis Prophylaxis: PPI  Mobility: PT as able     I have personally and independently provided [ 35 ] minutes of critical care services. This excludes any time spent on separate procedures or teaching. I have reviewed and amended the note as needed. Treatment plan discussed with SICU team, nurses and primary team at the time of the multidisciplinary rounds. The above note is NOT written at the time of rounds and will reflect all changes throughout management of the patient for the day note is written for.    Madai Peters MD  Trauma/ACS/SICU Attending . Critical Care: [id number]   This patient has a high probability of sudden, clinically significant deterioration, which requires the highest level of physician preparedness to intervene urgently. I managed/supervised life or organ supporting interventions that required frequent physician assessment. I devoted my full attention in the ICU to the direct care of this patient for the period of time indicated below. Time I spent with the family or surrogate(s) is included only if the patient was incapable of providing the necessary information or participating in medical decision making. Time devoted to teaching and to any procedures I billed separately is not included.     PATRICIA SPRAGUE 74y Female admitted to perforated peptic ulcer now s/p ex lap, distal antrectomy, abthera placement requiring vasopressors. RTOR 5/16 s/p second look laparotomy, Billroth II reconstruction, gastrojejunostomy tube replaced with a jejunal feeding tube and abdominal wall closure.  duodenal stump leak, treating with antibiotics.   Patient is examined and evaluated at the bedside with SICU team. Treatment plan discussed with SICU team, nurses and primary team.   Chest X-ray and all relevant studies reviewed during rounds.  Will continue hemodynamic monitoring as per protocol in SICU.    Neuro: PRN pain meds, delirium precautions  CV: continue to monitor  Respiratory: chronic trach, bilateral pleural effusions, chest PT, PRN nebs, diurese, daily CXR  GI: GJ tube placement today, strict NPO, no anastomotic leak,   TPN for nutrition until TF restarted with GJ tube and at goal  Renal: history of solitary kidney, NOLA, now improving  diuresing with goal of net even and or negative   ID: s/p IV antibiotics, stopped on 5/27/25  Heme: acute blood loss anemia, goal Hb > 7  Endocrine: ISS  PV: follow pulse exam  Skin: decub precautions  DVT Prophylaxis:  SCDs  chemical DVT ppx  Stress Gastritis Prophylaxis: PPI  Mobility: PT as able     I have personally and independently provided [ 35 ] minutes of critical care services. This excludes any time spent on separate procedures or teaching. I have reviewed and amended the note as needed. Treatment plan discussed with SICU team, nurses and primary team at the time of the multidisciplinary rounds. The above note is NOT written at the time of rounds and will reflect all changes throughout management of the patient for the day note is written for.    Madai Peters MD  Trauma/ACS/SICU Attending .

## 2025-05-29 LAB
ANION GAP SERPL CALC-SCNC: 11 MMOL/L — SIGNIFICANT CHANGE UP (ref 7–14)
BUN SERPL-MCNC: 50 MG/DL — HIGH (ref 10–20)
CALCIUM SERPL-MCNC: 9.4 MG/DL — SIGNIFICANT CHANGE UP (ref 8.4–10.5)
CHLORIDE SERPL-SCNC: 110 MMOL/L — SIGNIFICANT CHANGE UP (ref 98–110)
CO2 SERPL-SCNC: 23 MMOL/L — SIGNIFICANT CHANGE UP (ref 17–32)
CREAT SERPL-MCNC: 0.7 MG/DL — SIGNIFICANT CHANGE UP (ref 0.7–1.5)
EGFR: 92 ML/MIN/1.73M2 — SIGNIFICANT CHANGE UP
EGFR: 92 ML/MIN/1.73M2 — SIGNIFICANT CHANGE UP
GLUCOSE SERPL-MCNC: 114 MG/DL — HIGH (ref 70–99)
HCT VFR BLD CALC: 24 % — LOW (ref 37–47)
HGB BLD-MCNC: 7.5 G/DL — LOW (ref 12–16)
MAGNESIUM SERPL-MCNC: 2 MG/DL — SIGNIFICANT CHANGE UP (ref 1.8–2.4)
MCHC RBC-ENTMCNC: 27.4 PG — SIGNIFICANT CHANGE UP (ref 27–31)
MCHC RBC-ENTMCNC: 31.3 G/DL — LOW (ref 32–37)
MCV RBC AUTO: 87.6 FL — SIGNIFICANT CHANGE UP (ref 81–99)
NRBC BLD AUTO-RTO: 0 /100 WBCS — SIGNIFICANT CHANGE UP (ref 0–0)
PHOSPHATE SERPL-MCNC: 2.9 MG/DL — SIGNIFICANT CHANGE UP (ref 2.1–4.9)
PLATELET # BLD AUTO: 193 K/UL — SIGNIFICANT CHANGE UP (ref 130–400)
PMV BLD: 11.4 FL — HIGH (ref 7.4–10.4)
POTASSIUM SERPL-MCNC: 4.1 MMOL/L — SIGNIFICANT CHANGE UP (ref 3.5–5)
POTASSIUM SERPL-SCNC: 4.1 MMOL/L — SIGNIFICANT CHANGE UP (ref 3.5–5)
RBC # BLD: 2.74 M/UL — LOW (ref 4.2–5.4)
RBC # FLD: 16.3 % — HIGH (ref 11.5–14.5)
SODIUM SERPL-SCNC: 144 MMOL/L — SIGNIFICANT CHANGE UP (ref 135–146)
WBC # BLD: 10.71 K/UL — SIGNIFICANT CHANGE UP (ref 4.8–10.8)
WBC # FLD AUTO: 10.71 K/UL — SIGNIFICANT CHANGE UP (ref 4.8–10.8)

## 2025-05-29 PROCEDURE — 71045 X-RAY EXAM CHEST 1 VIEW: CPT | Mod: 26

## 2025-05-29 PROCEDURE — 71250 CT THORAX DX C-: CPT | Mod: 26

## 2025-05-29 PROCEDURE — 99232 SBSQ HOSP IP/OBS MODERATE 35: CPT

## 2025-05-29 PROCEDURE — 99233 SBSQ HOSP IP/OBS HIGH 50: CPT | Mod: 24,25

## 2025-05-29 PROCEDURE — 74177 CT ABD & PELVIS W/CONTRAST: CPT | Mod: 26

## 2025-05-29 RX ORDER — SODIUM/POT/MAG/CALC/CHLOR/ACET 35-20-5MEQ
1 VIAL (ML) INTRAVENOUS
Refills: 0 | Status: DISCONTINUED | OUTPATIENT
Start: 2025-05-29 | End: 2025-05-29

## 2025-05-29 RX ORDER — ACETAMINOPHEN 500 MG/5ML
650 LIQUID (ML) ORAL EVERY 6 HOURS
Refills: 0 | Status: DISCONTINUED | OUTPATIENT
Start: 2025-05-29 | End: 2025-06-03

## 2025-05-29 RX ORDER — RIVAROXABAN 10 MG/1
20 TABLET, FILM COATED ORAL
Refills: 0 | Status: DISCONTINUED | OUTPATIENT
Start: 2025-05-29 | End: 2025-06-03

## 2025-05-29 RX ORDER — IOHEXOL 350 MG/ML
30 INJECTION, SOLUTION INTRAVENOUS ONCE
Refills: 0 | Status: COMPLETED | OUTPATIENT
Start: 2025-05-29 | End: 2025-05-29

## 2025-05-29 RX ADMIN — Medication 1 APPLICATION(S): at 05:44

## 2025-05-29 RX ADMIN — IPRATROPIUM BROMIDE AND ALBUTEROL SULFATE 3 MILLILITER(S): .5; 2.5 SOLUTION RESPIRATORY (INHALATION) at 13:59

## 2025-05-29 RX ADMIN — IPRATROPIUM BROMIDE AND ALBUTEROL SULFATE 3 MILLILITER(S): .5; 2.5 SOLUTION RESPIRATORY (INHALATION) at 19:57

## 2025-05-29 RX ADMIN — Medication 4 MILLILITER(S): at 01:25

## 2025-05-29 RX ADMIN — Medication 4 MILLILITER(S): at 08:54

## 2025-05-29 RX ADMIN — Medication 40 MILLIGRAM(S): at 13:21

## 2025-05-29 RX ADMIN — Medication 58 EACH: at 20:42

## 2025-05-29 RX ADMIN — NYSTATIN 1 APPLICATION(S): 100000 CREAM TOPICAL at 18:09

## 2025-05-29 RX ADMIN — RIVAROXABAN 20 MILLIGRAM(S): 10 TABLET, FILM COATED ORAL at 18:09

## 2025-05-29 RX ADMIN — Medication 4 MILLILITER(S): at 19:59

## 2025-05-29 RX ADMIN — IPRATROPIUM BROMIDE AND ALBUTEROL SULFATE 3 MILLILITER(S): .5; 2.5 SOLUTION RESPIRATORY (INHALATION) at 08:54

## 2025-05-29 RX ADMIN — IOHEXOL 30 MILLILITER(S): 350 INJECTION, SOLUTION INTRAVENOUS at 13:22

## 2025-05-29 RX ADMIN — IPRATROPIUM BROMIDE AND ALBUTEROL SULFATE 3 MILLILITER(S): .5; 2.5 SOLUTION RESPIRATORY (INHALATION) at 01:26

## 2025-05-29 RX ADMIN — Medication 4 MILLILITER(S): at 14:00

## 2025-05-29 RX ADMIN — NYSTATIN 1 APPLICATION(S): 100000 CREAM TOPICAL at 05:44

## 2025-05-29 NOTE — PROGRESS NOTE ADULT - ASSESSMENT
81F PMHx HTN, a-fib (on xarelto), solitary kidney, DM, GERD, prior open cholecystectomy c/b suture granulomas s/p 2019 abdominal wall debridements by Dr. Banda, respiratory distress with prolonged ventilation during 11/2024 admission, s/p 11/19 tracheostomy and gastrostomy and feeding jejunostomy tube by Dr. Champion, ya catheter dependence who presented to the ED from NH on 5/13 with 4 days progressively worsening abdominal pain. Found to have perforated peptic ulcer. S/p EX LAP AND ANTRECTOMY, ABD LEFT OPEN W/ ABTHERA and then 5/17 S/P 2ND LOOK LAP, BILROTH 2, PLACEMENT OF J tube. GI consulted for replacement of J tube with GJ tube.     #J tube exchange to GJ tube based on surgery team request to help healing suspected leak and to restore feeding as J tube noted to be coiled in stomach   #Inflammatory changes noted on CT AP 5/19; possible anastomotic leak  - KUB noted to have J tube coiled in stomach.   - CTAP on 5/19 showing  Inflammatory changes inferior to the left lobe of the liver are nonspecific and may reflect postoperative in etiology as well as inflammatory or infectious etiology. Biliary leak cannot be excluded either. Can consider HIDA scan (biliary leak study)   - LFTs WNL   - no leukocytosis, Afebrile   - HP drain in place, bilious output    EGD 5/28: S/p successful PEGJ placement, no complications    Rec  PPI BID for 8 weeks (clean based ulcer on EGD)  Diet as per surgery  Ok from GI perspective to use PEGJ tube

## 2025-05-29 NOTE — PROGRESS NOTE ADULT - TIME BILLING
I have personally seen and examined this patient.  I have reviewed all pertinent clinical information and reviewed all relevant imaging and diagnostic studies personally.   I counseled the patient about diagnostic testing and treatment plan.   I discussed my recommendations with the primary team SICU
I have personally seen and examined this patient.  I have reviewed all pertinent clinical information and reviewed all relevant imaging and diagnostic studies personally.   I counseled the patient about diagnostic testing and treatment plan.   I discussed my recommendations with the primary team SICU
Reviewed all pertinent clinical information and reviewed all relevant imaging and diagnostic studies. Counseled the patient /HCP about diagnostic testing and treatment plan. All questions were answered.  Discussed recommendations with the primary team and coordinated care. Time spent on this encounter excludes teaching time and separately reported services.
I have personally seen and examined this patient.  I have reviewed all pertinent clinical information and reviewed all relevant imaging and diagnostic studies personally.   I counseled the patient about diagnostic testing and treatment plan.   I discussed my recommendations with the primary team SICU & GI fellow.
Reviewed all pertinent clinical information and reviewed all relevant imaging and diagnostic studies. Counseled the patient  HCP about diagnostic testing and treatment plan. All questions were answered.  Discussed recommendations with the primary team and coordinated care. Time spent on this encounter excludes teaching time and separately reported services.

## 2025-05-29 NOTE — PROGRESS NOTE ADULT - SUBJECTIVE AND OBJECTIVE BOX
PATRICIA SPRAGUE   [id number]   [birth year]    74yF  ============================================================   DATE OF INITIAL SICU/SDU CONSULT: 05-13-25    INDICATION FOR SICU CONSULT:  perforated duodenal ulcer      SICU COURSE EVENTS :  05-13 - admitted to SICU service  05-14 - s/p OR for exploratory laparotomy, started remdesivir for covid +, echo ordered  5/15 - Echo showing EF 30-35%, cardiology consulted. Pending RTOR. Remains on vasopressors, weaning.  5/16: S/p Exploratory laparotomy, bilroth 2, J tube placement. Episode of wheezing. Changed to meropenem per ID. AFIB slow ventricular response HR 40s-60s, remains on low dose pressors   5/17: Tolerating T piece, trickle feed started via J tube, s/p 1u PRBC to wean off levophed, remains on vasopressin, IVL  5/18: Midodrine 10mg q8 started, weaned off levophed. BULMARO drain bilious output > strict NPO, f/u fluid composition   5/19: CTAP - No evidence of drainable fluid collection.   5/20: Trickle feeds restarted but discontinued 2/2 high gastric residual, NGT kep to suction. Caspofungin discontinued, narrowed to diclucan.  5/21: GI consulted for GJ replacement  5/23: PICC line placed with IR, started TPN in PM, IVL  5/24: diuresis with lasix. albumin 55 250cc x 1 given overnight.  5/25- 25% 50cc albumin x 1 given for SBP 90s, improved to 120s. Diuresis with 20mg lasix x 1, >1.8L response. IPV increased to Q6 hours.  5/26: Diuresis with 20mg lasix, given 25% 50cc, >1L response.    5/28: S/p GJ tube placement. D/c diflucan. 20 lasix.       24HOUR EVENTS  5/28  NIGHTS  - PM rounds: , NSR HR 90s, saturating 96% on T piece, trickle feeds via J tube, G tube gravity, TPN  - 430AM labs:   - AM CXR  - CT IV/PO contrast tomorrow     DAY  -dc difclucan  -GJ replacement today, started trickle feeds via J-tube, g-port to gravity   -diuresis with 20mg lasix --> > 1.8L OP  -repeat CT A/P with PO/IV contrast tomorrow   - repeat echo : EF 65-70%, borderline pulmonary hypertension       [X] A ten-point review of systems was negative except as expressed in note.  [X] History was obtained from patient. If unable to participate in their care, history obtained from review of the chart and collateral sources of information.  ============================================================      PATRICIA SPRAGUE   [id number]   [birth year]    74yF  ============================================================   DATE OF INITIAL SICU/SDU CONSULT: 25    INDICATION FOR SICU CONSULT:  perforated duodenal ulcer      SICU COURSE EVENTS :   - admitted to SICU service   - s/p OR for exploratory laparotomy, started remdesivir for covid +, echo ordered  5/15 - Echo showing EF 30-35%, cardiology consulted. Pending RTOR. Remains on vasopressors, weaning.  : S/p Exploratory laparotomy, bilroth 2, J tube placement. Episode of wheezing. Changed to meropenem per ID. AFIB slow ventricular response HR 40s-60s, remains on low dose pressors   : Tolerating T piece, trickle feed started via J tube, s/p 1u PRBC to wean off levophed, remains on vasopressin, IVL  : Midodrine 10mg q8 started, weaned off levophed. BULMARO drain bilious output > strict NPO, f/u fluid composition   : CTAP - No evidence of drainable fluid collection.   : Trickle feeds restarted but discontinued 2/2 high gastric residual, NGT kep to suction. Caspofungin discontinued, narrowed to diclucan.  : GI consulted for GJ replacement  : PICC line placed with IR, started TPN in PM, IVL  : diuresis with lasix. albumin 55 250cc x 1 given overnight.  - 25% 50cc albumin x 1 given for SBP 90s, improved to 120s. Diuresis with 20mg lasix x 1, >1.8L response. IPV increased to Q6 hours.  : Diuresis with 20mg lasix, given 25% 50cc, >1L response.    : S/p GJ tube placement. D/c diflucan. 20 lasix.       24HOUR EVENTS    NIGHTS  - PM rounds: , NSR HR 90s, saturating 96% on T piece, trickle feeds via J tube, G tube gravity, TPN  - 430AM labs:   - AM CXR  - CT IV/PO contrast tomorrow     DAY  -dc difclucan  -GJ replacement today, started trickle feeds via J-tube, g-port to gravity   -diuresis with 20mg lasix --> > 1.8L OP  -repeat CT A/P with PO/IV contrast tomorrow   - repeat echo : EF 65-70%, borderline pulmonary hypertension       [X] A ten-point review of systems was negative except as expressed in note.  [X] History was obtained from patient. If unable to participate in their care, history obtained from review of the chart and collateral sources of information.  ============================================================     Daily     Daily Weight in k.7 (29 May 2025 04:00)  Diet, NPO:   Tube Feeding Modality: Jejunostomy  Glucerna 1.2 Jeff [facility]  Total Volume for 24 Hours (mL): 480  Continuous  Starting Tube Feed Rate mL per Hour: 10  Increase Tube Feed Rate by (mL): 10     Every 4 hours  Until Goal Tube Feed Rate (mL per Hour): 20  Tube Feed Duration (in Hours): 24  Tube Feed Start Time: 20:00 (25 @ 18:06)    CURRENT MEDS:  Neurologic Medications  acetaminophen   IVPB .. 1000 milliGRAM(s) IV Intermittent once    Respiratory Medications  albuterol/ipratropium for Nebulization 3 milliLiter(s) Nebulizer every 6 hours  sodium chloride 3%  Inhalation 4 milliLiter(s) Inhalation every 6 hours    Cardiovascular Medications    Gastrointestinal Medications  lipid, fat emulsion (Fish Oil and Plant Based) 20% Infusion 0.5045 Gm/kG/Day IV Continuous <Continuous>  pantoprazole  Injectable 40 milliGRAM(s) IV Push daily  Parenteral Nutrition - Adult 1 Each TPN Continuous <Continuous>  sodium chloride 0.9% lock flush 10 milliLiter(s) IV Push every 1 hour PRN Pre/post blood products, medications, blood draw, and to maintain line patency    Genitourinary Medications    Hematologic/Oncologic Medications  enoxaparin Injectable 40 milliGRAM(s) SubCutaneous every 24 hours    Antimicrobial/Immunologic Medications    Endocrine/Metabolic Medications    Topical/Other Medications  chlorhexidine 2% Cloths 1 Application(s) Topical <User Schedule>  nystatin Powder 1 Application(s) Topical two times a day    ICU Vital Signs Last 24 Hrs  T(C): 36.4 (29 May 2025 04:00), Max: 36.6 (28 May 2025 16:00)  T(F): 97.5 (29 May 2025 04:00), Max: 97.9 (28 May 2025 16:00)  HR: 85 (29 May 2025 04:00) (77 - 96)  BP: 115/67 (29 May 2025 04:00) (101/52 - 131/63)  BP(mean): 82 (29 May 2025 04:00) (78 - 95)  ABP: --  ABP(mean): --  RR: 12 (29 May 2025 04:00) (7 - 31)  SpO2: 98% (29 May 2025 04:00) (92% - 100%)    O2 Parameters below as of 29 May 2025 04:00  Patient On (Oxygen Delivery Method): T-piece    O2 Concentration (%): 50          I&O's Summary    28 May 2025 07:  -  29 May 2025 07:00  --------------------------------------------------------  IN: 1562.8 mL / OUT: 3837.5 mL / NET: -2274.7 mL      I&O's Detail    28 May 2025 07:01  -  29 May 2025 07:00  --------------------------------------------------------  IN:    Fat Emulsion (Fish Oil &amp; Plant Based) 20% Infusion: 228.8 mL    TPN (Total Parenteral Nutrition): 1334 mL  Total IN: 1562.8 mL    OUT:    Bulb (mL): 47.5 mL    Indwelling Catheter - Urethral (mL): 3425 mL    Nasogastric/Oral tube (mL): 350 mL    Oral Fluid: 0 mL    PEGJ (Percutaneous Endoscopic Gastrojejunostomy) Tube (mL): 15 mL  Total OUT: 3837.5 mL    Total NET: -2274.7 mL          PHYSICAL EXAM:     NEURO/GENERAL: NAD. GCS 11T.    RESP: equal chest rise b/l, no signs of respiratory distress, tolerating T piece.    CARDIAC: S1,S2. afib on monitor, nontachycardic.   GI: abdomen soft, nondistended, nontender. GJ in place.   BULMARO drain draining yellow/ascitic fluid   : urinary catheter in place   EXTREMITIES: moving extremities; peripheral edema throughout  SKIN: sacral friction wound

## 2025-05-29 NOTE — PHARMACOTHERAPY INTERVENTION NOTE - COMMENTS
PATRICIA SPRAGUE 74y Female    Assessed patient medications for high-risk medications:    Benzodiazepines  Opioids  High-anticholinergic medications  Sedatives/ sleep medications  Muscle relaxants  Tricyclic antidepressants  Antipsychotics    acetaminophen   IVPB .. 1000 milliGRAM(s) IV Intermittent once  albuterol    90 MICROgram(s) HFA Inhaler 2 Puff(s) Inhalation every 6 hours  chlorhexidine 2% Cloths 1 Application(s) Topical <User Schedule>  dextrose 5% + sodium chloride 0.45%. 1000 milliLiter(s) IV Continuous <Continuous>  enoxaparin Injectable 40 milliGRAM(s) SubCutaneous every 24 hours  fluconAZOLE IVPB 200 milliGRAM(s) IV Intermittent every 24 hours  HYDROmorphone  Injectable 0.25 milliGRAM(s) IV Push every 6 hours PRN  ipratropium 17 MICROgram(s) HFA Inhaler 1 Puff(s) Inhalation every 6 hours  meropenem  IVPB 1000 milliGRAM(s) IV Intermittent every 8 hours  nystatin Powder 1 Application(s) Topical two times a day  pantoprazole  Injectable 40 milliGRAM(s) IV Push every 12 hours  sodium chloride 0.9% lock flush 10 milliLiter(s) IV Push every 1 hour PRN  sodium chloride 3%  Inhalation 4 milliLiter(s) Inhalation every 12 hours      [ x    ] Team informed of high risk medications   [     ] None of the above medications identified.   [   x   ] Discussed with prescriber and no interventions at this time  - patient is on hydromorphone IV PRN for acute pain, patient is NPO and still requiring doses, once pain is better controlled will recommend to d/c, currently no side effects identified and will continue to monitor     Discussed with prescriber, if appropriate, to consider:   [      ]HOLDING   [      ]REDUCING  [   x   ]CONTINUE  
PATRICIA SPRAGUE 74y Female    Assessed patient medications for high-risk medications:    Benzodiazepines  Opioids  High-anticholinergic medications  Sedatives/ sleep medications  Muscle relaxants  Tricyclic antidepressants  Antipsychotics    acetaminophen   IVPB .. 1000 milliGRAM(s) IV Intermittent once  albuterol/ipratropium for Nebulization 3 milliLiter(s) Nebulizer every 6 hours  chlorhexidine 2% Cloths 1 Application(s) Topical <User Schedule>  enoxaparin Injectable 40 milliGRAM(s) SubCutaneous every 24 hours  fluconAZOLE IVPB 200 milliGRAM(s) IV Intermittent every 24 hours  lipid, fat emulsion (Fish Oil and Plant Based) 20% Infusion 0.5045 Gm/kG/Day IV Continuous <Continuous>  meropenem  IVPB 1000 milliGRAM(s) IV Intermittent every 8 hours  nystatin Powder 1 Application(s) Topical two times a day  pantoprazole  Injectable 40 milliGRAM(s) IV Push daily  Parenteral Nutrition - Adult 1 Each TPN Continuous <Continuous>  Parenteral Nutrition - Adult 1 Each TPN Continuous <Continuous>  sodium chloride 0.9% lock flush 10 milliLiter(s) IV Push every 1 hour PRN  sodium chloride 3%  Inhalation 4 milliLiter(s) Inhalation every 6 hours      [     ] Team informed of high risk medications   [ x    ] None of the above medications identified.   [      ] Discussed with prescriber and no interventions at this time    Discussed with prescriber, if appropriate, to consider:   [      ]HOLDING   [      ]REDUCING  [      ]CONTINUE  
PATRICIA SPRAGUE 74y Female    Assessed patient medications for high-risk medications:    Benzodiazepines  Opioids  High-anticholinergic medications  Sedatives/ sleep medications  Muscle relaxants  Tricyclic antidepressants  Antipsychotics    acetaminophen   Oral Liquid .. 650 milliGRAM(s) Oral every 6 hours PRN  albuterol/ipratropium for Nebulization 3 milliLiter(s) Nebulizer every 6 hours  chlorhexidine 2% Cloths 1 Application(s) Topical <User Schedule>  iohexol 300 mG (iodine)/mL Oral Solution 30 milliLiter(s) Oral once  lipid, fat emulsion (Fish Oil and Plant Based) 20% Infusion 0.5045 Gm/kG/Day IV Continuous <Continuous>  nystatin Powder 1 Application(s) Topical two times a day  pantoprazole  Injectable 40 milliGRAM(s) IV Push daily  Parenteral Nutrition - Adult 1 Each TPN Continuous <Continuous>  Parenteral Nutrition - Adult 1 Each TPN Continuous <Continuous>  rivaroxaban 20 milliGRAM(s) Oral with dinner  sodium chloride 0.9% lock flush 10 milliLiter(s) IV Push every 1 hour PRN  sodium chloride 3%  Inhalation 4 milliLiter(s) Inhalation every 6 hours      [     ] Team informed of high risk medications   [   x  ] None of the above medications identified.   [      ] Discussed with prescriber and no interventions at this time    Discussed with prescriber, if appropriate, to consider:   [      ]HOLDING   [      ]REDUCING  [      ]CONTINUE  
Patient CrCl >60 ml/min, recommend to increase meropenem 1g q8h for ESBL E. coli UTI. Duration ordered is 3 days, clarified with Dr. Serna to end after 7 days of therapy 
APTRICIA SPRAGUE 74y Female    Assessed patient medications for high-risk medications:    Benzodiazepines  Opioids  High-anticholinergic medications  Sedatives/ sleep medications  Muscle relaxants  Tricyclic antidepressants  Antipsychotics    acetaminophen   IVPB .. 1000 milliGRAM(s) IV Intermittent once  albuterol    90 MICROgram(s) HFA Inhaler 2 Puff(s) Inhalation every 6 hours  chlorhexidine 2% Cloths 1 Application(s) Topical <User Schedule>  dextrose 5% + sodium chloride 0.45%. 1000 milliLiter(s) IV Continuous <Continuous>  enoxaparin Injectable 40 milliGRAM(s) SubCutaneous every 24 hours  fluconAZOLE IVPB 200 milliGRAM(s) IV Intermittent every 24 hours  HYDROmorphone  Injectable 0.25 milliGRAM(s) IV Push every 6 hours PRN  ipratropium 17 MICROgram(s) HFA Inhaler 1 Puff(s) Inhalation every 6 hours  meropenem  IVPB 1000 milliGRAM(s) IV Intermittent every 8 hours  nystatin Powder 1 Application(s) Topical two times a day  pantoprazole  Injectable 40 milliGRAM(s) IV Push every 12 hours  sodium chloride 0.9% lock flush 10 milliLiter(s) IV Push every 1 hour PRN  sodium chloride 3%  Inhalation 4 milliLiter(s) Inhalation every 12 hours      [     ] Team informed of high risk medications   [     ] None of the above medications identified.   [    x  ] Discussed with prescriber and no interventions at this time  - patient is currently on hydromorphone IV PRN for acute pain s/p laparotomy - still requiring doses for severe pain at this time, no side effects identified, will continue to monitor    Discussed with prescriber, if appropriate, to consider:   [      ]HOLDING   [      ]REDUCING  [    x  ]CONTINUE  
PA initiated consult: Contacted for assistance with piperacillin-tazobactam order    Recommended continuing piperacillin-tazobactam 3.375g IV q12h extended infusion over 4 hours, dosed based on a current estimated creatinine clearance of 19mL/min, in the setting of duodenal perforation.    Michael Kc, PharmD, [facility]  Clinical Pharmacy Specialist, Infectious Diseases  Tele-Antimicrobial Stewardship Program (Tele-ASP)  Tele-ASP Phone: [phone] 
PATRICIA SPRAGUE 74y Female    Assessed patient medications for high-risk medications:    Benzodiazepines  Opioids  High-anticholinergic medications  Sedatives/ sleep medications  Muscle relaxants  Tricyclic antidepressants  Antipsychotics    acetaminophen   IVPB .. 1000 milliGRAM(s) IV Intermittent once  albuterol/ipratropium for Nebulization 3 milliLiter(s) Nebulizer every 6 hours  chlorhexidine 2% Cloths 1 Application(s) Topical <User Schedule>  enoxaparin Injectable 40 milliGRAM(s) SubCutaneous every 24 hours  lipid, fat emulsion (Fish Oil and Plant Based) 20% Infusion 0.5045 Gm/kG/Day IV Continuous <Continuous>  nystatin Powder 1 Application(s) Topical two times a day  pantoprazole  Injectable 40 milliGRAM(s) IV Push daily  Parenteral Nutrition - Adult 1 Each TPN Continuous <Continuous>  Parenteral Nutrition - Adult 1 Each TPN Continuous <Continuous>  sodium chloride 0.9% lock flush 10 milliLiter(s) IV Push every 1 hour PRN  sodium chloride 3%  Inhalation 4 milliLiter(s) Inhalation every 6 hours      [     ] Team informed of high risk medications   [  x   ] None of the above medications identified.   [      ] Discussed with prescriber and no interventions at this time    Discussed with prescriber, if appropriate, to consider:   [      ]HOLDING   [      ]REDUCING  [      ]CONTINUE  
PA initiated consult: Contacted for assistance with vancomycin order    Recommended vancomycin dose by levels in setting of [place]. Team plans to order level for tomorrow morning to assist with further dosing recommendations.    Michael Kc, PharmD, [facility]  Clinical Pharmacy Specialist, Infectious Diseases  Tele-Antimicrobial Stewardship Program (Tele-ASP)  Tele-ASP Phone: [phone] 
PATRICIA SPRAGUE 74y Female    Assessed patient medications for high-risk medications:    Benzodiazepines  Opioids  High-anticholinergic medications  Sedatives/ sleep medications  Muscle relaxants  Tricyclic antidepressants  Antipsychotics    acetaminophen   IVPB .. 1000 milliGRAM(s) IV Intermittent once  albuterol    90 MICROgram(s) HFA Inhaler 2 Puff(s) Inhalation every 6 hours  chlorhexidine 2% Cloths 1 Application(s) Topical <User Schedule>  dextrose 5% + sodium chloride 0.45%. 1000 milliLiter(s) IV Continuous <Continuous>  enoxaparin Injectable 40 milliGRAM(s) SubCutaneous every 24 hours  fluconAZOLE IVPB 200 milliGRAM(s) IV Intermittent every 24 hours  HYDROmorphone  Injectable 0.25 milliGRAM(s) IV Push every 6 hours PRN  ipratropium 17 MICROgram(s) HFA Inhaler 1 Puff(s) Inhalation every 6 hours  melatonin 5 milliGRAM(s) Oral at bedtime  meropenem  IVPB 1000 milliGRAM(s) IV Intermittent every 8 hours  nystatin Powder 1 Application(s) Topical two times a day  pantoprazole  Injectable 40 milliGRAM(s) IV Push every 12 hours  sodium chloride 0.9% lock flush 10 milliLiter(s) IV Push every 1 hour PRN  sodium chloride 3%  Inhalation 4 milliLiter(s) Inhalation every 12 hours      [  x   ] Team informed of high risk medications   [     ] None of the above medications identified.   [    x  ] Discussed with prescriber and no interventions at this time  - patient is on hydromorphone IV PRN for acute pain, patient is NPO and still requiring doses, once pain is better controlled will recommend to d/c, currently no side effects identified and will continue to monitor.  Discussed with prescriber, if appropriate, to consider:   [      ]HOLDING   [      ]REDUCING  [ x     ]CONTINUE

## 2025-05-29 NOTE — CHART NOTE - NSCHARTNOTEFT_GEN_A_CORE
GI NUTRITION SUPPORT TEAM  -  PROGRESS NOTE     Interval Events:        REVIEW OF SYSTEMS:  Negative except as noted above.     VITALS:  T(F): 97 (05-29 @ 08:00), Max: 97.5 (05-29 @ 04:00)  HR: 83 (05-29 @ 08:00) (83 - 91)  BP: 117/57 (05-29 @ 08:00) (115/67 - 128/69)  RR: 24 (05-29 @ 08:00) (12 - 31)  SpO2: 98% (05-29 @ 08:00) (98% - 99%)    HEIGHT/WEIGHT/BMI:   Height (cm): 167 (05-23), 167.6 (04-11), 167.6 (11-20)  Weight (kg): 82 (05-27), 82.1 (04-14), 90.2 (11-20)  BMI (kg/m2): 29.4 (05-27), 29.4 (05-23), 29.2 (04-14), 32.1 (04-11)    I/Os:     05-28-25 @ 07:01  -  05-29-25 @ 07:00  --------------------------------------------------------  IN:    Fat Emulsion (Fish Oil &amp; Plant Based) 20% Infusion: 228.8 mL    TPN (Total Parenteral Nutrition): 1334 mL  Total IN: 1562.8 mL    OUT:    Bulb (mL): 47.5 mL    Indwelling Catheter - Urethral (mL): 3455 mL    Nasogastric/Oral tube (mL): 350 mL    Oral Fluid: 0 mL    PEGJ (Percutaneous Endoscopic Gastrojejunostomy) Tube (mL): 15 mL  Total OUT: 3867.5 mL    Total NET: -2304.7 mL      MEDICATIONS:   acetaminophen   IVPB .. 1000 milliGRAM(s) IV Intermittent once  albuterol/ipratropium for Nebulization 3 milliLiter(s) Nebulizer every 6 hours  chlorhexidine 2% Cloths 1 Application(s) Topical <User Schedule>  enoxaparin Injectable 40 milliGRAM(s) SubCutaneous every 24 hours  lipid, fat emulsion (Fish Oil and Plant Based) 20% Infusion 0.5045 Gm/kG/Day (20.8 mL/Hr) IV Continuous <Continuous>  nystatin Powder 1 Application(s) Topical two times a day  pantoprazole  Injectable 40 milliGRAM(s) IV Push daily  Parenteral Nutrition - Adult 1 Each (58 mL/Hr) TPN Continuous <Continuous>  sodium chloride 0.9% lock flush 10 milliLiter(s) IV Push every 1 hour PRN  sodium chloride 3%  Inhalation 4 milliLiter(s) Inhalation every 6 hours    LABS:                         7.5    10.71 )-----------( 193      ( 29 May 2025 05:17 )             24.0     144  |  110  |  50[H]  ----------------------------<  114[H]          (05-29-25 @ 05:17)  4.1   |  23  |  0.7    Ca    9.4          (05-29-25 @ 05:17)  Phos  2.9         (05-29-25 @ 05:17)  Mg     2.0         (05-29-25 @ 05:17)    Triglycerides, Serum: 111 mg/dL (05-24 @ 06:08)  Vitamin D, 25-Hydroxy: 30 ng/mL (04-10 @ 10:32)    Blood Glucose (Past 24 hours):  119 mg/dL (05-29 @ 00:48)  105 mg/dL (05-28 @ 11:46)    DIET:   Diet, NPO:   Tube Feeding Modality: Jejunostomy  Glucerna 1.2 Jeff (GLUCERNARTH)  Total Volume for 24 Hours (mL): 480  Continuous  Starting Tube Feed Rate {mL per Hour}: 10  Increase Tube Feed Rate by (mL): 10     Every 4 hours  Until Goal Tube Feed Rate (mL per Hour): 20  Tube Feed Duration (in Hours): 24  Tube Feed Start Time: 20:00 (05-28-25 @ 18:06) [Active]    Parenteral Nutrition - Adult 1 Each (58 mL/Hr) TPN Continuous <Continuous>, 05-28-25 @ 20:00, 20:00, Stop order after: 1 Days    ASSESSMENT  81F PMHx HTN, a-fib (on xarelto), solitary kidney, DM, GERD, prior open cholecystectomy c/b suture granulomas s/p 2019 abdominal wall debridements by Dr. Banda, respiratory distress with prolonged ventilation during 11/2024 admission, s/p 11/19 tracheostomy and gastrostomy and feeding jejunostomy tube by Dr. Champion, ya catheter dependence who presented to the ED from NH on 5/13 with 4 days progressively worsening abdominal pain. Found to have perforated peptic ulcer. s/p exploratory laparotomy antrectomy and D1 stapled off with temporary abdominal closure and abthera in place with RTOR 5/16 s/p Billroth II 40cm from LOT with gastrojejunostomy tube placed with jejunal feeding tube (distal tip in jejunum, no gastric access), RLQ Gregorio drain by anastomosis extending to duodenal stump.     - perforated prepyloric gastric ulcer, 5/16 s/p exploratory laparotomy antrectomy and D1 stapled off with temporary abdominal closure and abthera in place  - RTOR 5/16 s/p Billroth II 40cm from LOT with gastrojejunostomy tube placed with jejunal feeding tube (distal tip in jejunum, no gastric access), RLQ Gregorio drain by anastomosis extending to duodenal stump  - suspected bile leak, bilious output BULMARO drain, bilirubin 9.7   - jejunal tube in stomach, intolerance of gastric feeds  - prolonged NPO/inadequate nutrient intake (700mL total volume) X 11d- PN initiated 5/23    Est nutrient needs: ASPEN/SCCM obesity critical pt guidelines: [id number] kcals (11-14kcals/kg-Actual/bed Wt 5/20 89kg), 118g protein (2g/kg IBW of 59kg), fluid needs reassessed daily      PLAN  - rec give Pivot 1.5 via Jejunal port of GJ tube @ 10ml/hr (goal 47ml/hr X 18hrs with Prosource TF X 3/d)  - will follow GI NUTRITION SUPPORT TEAM  -  PROGRESS NOTE     Interval Events:    s/p replacement of feeding tube yesterday in endo. G-J tube placed, G port to gravity drainage with bilious output. Feeds started via jejunal port at 10ml/hr but now on hold. TPN infusing day#6 via LUE PICC. Picc site without erythema or drainage, dressing c/d/i.    REVIEW OF SYSTEMS:  Negative except as noted above.     VITALS:  T(F): 97 (05-29 @ 08:00), Max: 97.5 (05-29 @ 04:00)  HR: 83 (05-29 @ 08:00) (83 - 91)  BP: 117/57 (05-29 @ 08:00) (115/67 - 128/69)  RR: 24 (05-29 @ 08:00) (12 - 31)  SpO2: 98% (05-29 @ 08:00) (98% - 99%)    HEIGHT/WEIGHT/BMI:   Height (cm): 167 (05-23), 167.6 (04-11), 167.6 (11-20)  Weight (kg): 82 (05-27), 82.1 (04-14), 90.2 (11-20)  BMI (kg/m2): 29.4 (05-27), 29.4 (05-23), 29.2 (04-14), 32.1 (04-11)    I/Os:     05-28-25 @ 07:01  -  05-29-25 @ 07:00  --------------------------------------------------------  IN:    Fat Emulsion (Fish Oil &amp; Plant Based) 20% Infusion: 228.8 mL    TPN (Total Parenteral Nutrition): 1334 mL  Total IN: 1562.8 mL    OUT:    Bulb (mL): 47.5 mL    Indwelling Catheter - Urethral (mL): 3455 mL    Nasogastric/Oral tube (mL): 350 mL    Oral Fluid: 0 mL    PEGJ (Percutaneous Endoscopic Gastrojejunostomy) Tube (mL): 15 mL  Total OUT: 3867.5 mL    Total NET: -2304.7 mL      MEDICATIONS:   acetaminophen   IVPB .. 1000 milliGRAM(s) IV Intermittent once  albuterol/ipratropium for Nebulization 3 milliLiter(s) Nebulizer every 6 hours  chlorhexidine 2% Cloths 1 Application(s) Topical <User Schedule>  enoxaparin Injectable 40 milliGRAM(s) SubCutaneous every 24 hours  lipid, fat emulsion (Fish Oil and Plant Based) 20% Infusion 0.5045 Gm/kG/Day (20.8 mL/Hr) IV Continuous <Continuous>  nystatin Powder 1 Application(s) Topical two times a day  pantoprazole  Injectable 40 milliGRAM(s) IV Push daily  Parenteral Nutrition - Adult 1 Each (58 mL/Hr) TPN Continuous <Continuous>  sodium chloride 0.9% lock flush 10 milliLiter(s) IV Push every 1 hour PRN  sodium chloride 3%  Inhalation 4 milliLiter(s) Inhalation every 6 hours    LABS:                         7.5    10.71 )-----------( 193      ( 29 May 2025 05:17 )             24.0     144  |  110  |  50[H]  ----------------------------<  114[H]          (05-29-25 @ 05:17)  4.1   |  23  |  0.7    Ca    9.4          (05-29-25 @ 05:17)  Phos  2.9         (05-29-25 @ 05:17)  Mg     2.0         (05-29-25 @ 05:17)    Triglycerides, Serum: 111 mg/dL (05-24 @ 06:08)  Vitamin D, 25-Hydroxy: 30 ng/mL (04-10 @ 10:32)    Blood Glucose (Past 24 hours):  119 mg/dL (05-29 @ 00:48)  105 mg/dL (05-28 @ 11:46)    DIET:   Diet, NPO:   Tube Feeding Modality: Jejunostomy  Glucerna 1.2 Jeff (GLUCERNARTH)  Total Volume for 24 Hours (mL): 480  Continuous  Starting Tube Feed Rate {mL per Hour}: 10  Increase Tube Feed Rate by (mL): 10     Every 4 hours  Until Goal Tube Feed Rate (mL per Hour): 20  Tube Feed Duration (in Hours): 24  Tube Feed Start Time: 20:00 (05-28-25 @ 18:06) [Active]    Parenteral Nutrition - Adult 1 Each (58 mL/Hr) TPN Continuous <Continuous>, 05-28-25 @ 20:00, 20:00, Stop order after: 1 Days    ASSESSMENT  81F PMHx HTN, a-fib (on xarelto), solitary kidney, DM, GERD, prior open cholecystectomy c/b suture granulomas s/p 2019 abdominal wall debridements by Dr. Banda, respiratory distress with prolonged ventilation during 11/2024 admission, s/p 11/19 tracheostomy and gastrostomy and feeding jejunostomy tube by Dr. Champion, ya catheter dependence who presented to the ED from NH on 5/13 with 4 days progressively worsening abdominal pain. Found to have perforated peptic ulcer. s/p exploratory laparotomy antrectomy and D1 stapled off with temporary abdominal closure and abthera in place with RTOR 5/16 s/p Billroth II 40cm from LOT with gastrojejunostomy tube placed with jejunal feeding tube (distal tip in jejunum, no gastric access), RLQ Gregorio drain by anastomosis extending to duodenal stump.     - perforated prepyloric gastric ulcer, 5/16 s/p exploratory laparotomy antrectomy and D1 stapled off with temporary abdominal closure and abthera in place  - RTOR 5/16 s/p Billroth II 40cm from LOT with gastrojejunostomy tube placed with jejunal feeding tube (distal tip in jejunum, no gastric access), RLQ Gregorio drain by anastomosis extending to duodenal stump  - suspected bile leak, bilious output BULMARO drain, bilirubin 9.7   - jejunal tube in stomach, intolerance of gastric feeds  - prolonged NPO/inadequate nutrient intake (700mL total volume) X 11d- PN initiated 5/23    Est nutrient needs: ASPEN/SCCM obesity critical pt guidelines: [phone] kcals (11-14kcals/kg-Actual/bed Wt 5/20 89kg), 118g protein (2g/kg IBW of 59kg), fluid needs reassessed daily      PLAN  - rec give Pivot 1.5 via Jejunal port of GJ tube @ 10ml/hr (goal 47ml/hr X 18hrs with Prosource TF X 3/d)  - cont TPN tonight, once tolerance to enteral feeds established and rate increased will taper and d/c TPN  - monitor lytes, TPN adjusted daily  - sterile PICC care per protocol   - will follow

## 2025-05-29 NOTE — PROGRESS NOTE ADULT - SUBJECTIVE AND OBJECTIVE BOX
GENERAL SURGERY PROGRESS NOTE     PATRICIA SPRAGUE  [address] [place] [address] day :17d    POD: 12d  Procedure: Partial antrectomy    US Doppler guided insertion of central venous catheter    Laparotomy, second look, with packing removal    Billroth II operation    Replacement of gastrojejunostomy tube      Surgical Attending: Yumi Pena  24 hour events: J tube GJ exchange by GI done yesterday. Trickle feeds via J pending pump availability. G to gravity, <15cc over 24hrs output, bilious. Diuresed w/ 20mg lasix yesterday, 1.9L output via ya x24 hrs, echo 65-70%, borderline pulm HTN. Vik ss. No more ngt after GI procedure. On TPN and fat emulsion via PICC line.    PHYSICAL EXAM:  GENERAL: NAD, well-appearing  CHEST/LUNG: Equal chest rise bilaterally, on T-piece  HEART: Regular rate and rhythm  ABDOMEN: Soft, Nontender, Nondistended; dressings intact, vik in place. GJ intact, G to gravity (bilious)  EXTREMITIES:  No clubbing, cyanosis, or edema      T(F): 97.5 (05-29-25 @ 04:00), Max: 97.9 (05-28-25 @ 16:00)  HR: 85 (05-29-25 @ 04:00) (77 - 96)  BP: 115/67 (05-29-25 @ 04:00) (101/52 - 131/63)  ABP: --  ABP(mean): --  RR: 12 (05-29-25 @ 04:00) (7 - 31)  SpO2: 98% (05-29-25 @ 04:00) (92% - 100%)    IN'S / OUT's:    05-27-25 @ 07:01  -  05-28-25 @ 07:00  --------------------------------------------------------  IN:    Fat Emulsion (Fish Oil &amp; Plant Based) 20% Infusion: 41.6 mL    IV PiggyBack: 100 mL    IV PiggyBack: 50 mL    TPN (Total Parenteral Nutrition): 1395.6 mL  Total IN: 1587.2 mL    OUT:    Bulb (mL): 95 mL    Indwelling Catheter - Urethral (mL): 2425 mL    Nasogastric/Oral tube (mL): 350 mL    Oral Fluid: 0 mL  Total OUT: 2870 mL    Total NET: -1282.8 mL      05-28-25 @ 07:01  -  05-29-25 @ 06:56  --------------------------------------------------------  IN:    Fat Emulsion (Fish Oil &amp; Plant Based) 20% Infusion: 228.8 mL    TPN (Total Parenteral Nutrition): 1334 mL  Total IN: 1562.8 mL    OUT:    Bulb (mL): 47.5 mL    Indwelling Catheter - Urethral (mL): 3425 mL    Nasogastric/Oral tube (mL): 350 mL    Oral Fluid: 0 mL    PEGJ (Percutaneous Endoscopic Gastrojejunostomy) Tube (mL): 15 mL  Total OUT: 3837.5 mL    Total NET: -2274.7 mL          MEDICATIONS  (STANDING):  acetaminophen   IVPB .. 1000 milliGRAM(s) IV Intermittent once  albuterol/ipratropium for Nebulization 3 milliLiter(s) Nebulizer every 6 hours  chlorhexidine 2% Cloths 1 Application(s) Topical <User Schedule>  enoxaparin Injectable 40 milliGRAM(s) SubCutaneous every 24 hours  lipid, fat emulsion (Fish Oil and Plant Based) 20% Infusion 0.5045 Gm/kG/Day (20.8 mL/Hr) IV Continuous <Continuous>  nystatin Powder 1 Application(s) Topical two times a day  pantoprazole  Injectable 40 milliGRAM(s) IV Push daily  Parenteral Nutrition - Adult 1 Each (58 mL/Hr) TPN Continuous <Continuous>  sodium chloride 3%  Inhalation 4 milliLiter(s) Inhalation every 6 hours    MEDICATIONS  (PRN):  sodium chloride 0.9% lock flush 10 milliLiter(s) IV Push every 1 hour PRN Pre/post blood products, medications, blood draw, and to maintain line patency      LABS  Labs:  CAPILLARY BLOOD GLUCOSE      POCT Blood Glucose.: 119 mg/dL (29 May 2025 00:48)  POCT Blood Glucose.: 105 mg/dL (28 May 2025 11:46)                          7.5    10.71 )-----------( 193      ( 29 May 2025 05:17 )             24.0         05-29    144  |  110  |  50[H]  ----------------------------<  114[H]  4.1   |  23  |  0.7      Calcium: 9.4 mg/dL (05-29-25 @ 05:17)      LFTs:         Coags:     11.90  ----< 1.01    ( 27 May 2025 22:35 )     33.7                Urinalysis Basic - ( 29 May 2025 05:17 )    Color: x / Appearance: x / SG: x / pH: x  Gluc: 114 mg/dL / Ketone: x  / Bili: x / Urobili: x   Blood: x / Protein: x / Nitrite: x   Leuk Esterase: x / RBC: x / WBC x   Sq Epi: x / Non Sq Epi: x / Bacteria: x            RADIOLOGY & ADDITIONAL TESTS:

## 2025-05-29 NOTE — PHARMACOTHERAPY INTERVENTION NOTE - NSPHARMCOMMMEDSAFE
Age-related (geriatrics) therapy recommendation

## 2025-05-29 NOTE — PROGRESS NOTE ADULT - ASSESSMENT
74y old Female with above PMHx, s/p exlap and second look laparotomy with billroth II and placement of J tube, 5/28 replacement of J to GJ by GI team.    Plan:  - f/u tolerance of feeds via J port on GJ, when tube feeds pump arrives  - keep G to gravity  - consider PPI BID per GI recs  - monitor respiratory status, on T-piece currently  - monitor vik drain output, bilious  - TPN until tube feeds tolerated  - rest of care per SICU   - Monitor uop, diuresis prn    SURGERY SPECTRA: [id number]

## 2025-05-29 NOTE — PROGRESS NOTE ADULT - ASSESSMENT
Assessment	  74F found to have perforated peptic ulcer now s/p ex lap, distal antrectomy, abthera placement requiring vasopressors. RTOR 5/16 s/p second look laparotomy, Billroth II reconstruction, gastrojejunostomy tube replaced with a jejunal feeding tube, abdomen closed at the end of the case.     NEUROLOGICAL:  #Acute pain  - IV APAP while NPO     RESPIRATORY:   #Respiratory failure, s/p tracheostomy (11/24)    - now with 8 portex cuffed trach (switched in OR on 5/14)    - tolerating T- piece    - continue duonebs    - lasix for diuresis given daily, last dose 5/28  #Decreased small bibasilar opacities/atelectasis, right greater than left on CT  - 5/16: s/p deep suctioning by anesthesia w/ copious secretions   #h/o asthma  - atrovent and albuterol   #Increased secretions  - 3% inhaled NS  # COVID+ on admission --- resolved  - s/p Remdesivir  - off contact precautions 5/24  #Activity   - increase as tolerated     CARDS:   #Nonsustained Vtach - self resolved   - cards consult: management of septic shock. Start short acting AC for afib. Start beta blockers once off pressors   #hypotension 2/2 sepsis   - off vasopressors since 5/19  - MAP >65  - Holding midodrine 10 mg Q8h while NPO  #hx afib  - transiently was in AFIB slow ventricular response (HR 40s-60s)  - holding home Xarelto 15 QD  #hx HTN  - holding home losartan 100QD  #elevated troponin  - 143 > 84, no longer trending  #TTE 11/24: EF 71%. Mild-mod AS. Trivial pericardial effusion.   - Echo 5/15: EF 30-35%, multiple left ventricular motion wall abnormalities. Possible Takotsubo vs ischemic. Global left ventricular systolic function.   - Echo 5/28: EF 65-70%, borderline pulmonary hypertension     GASTROINTESTINAL/NUTRITION:   #perforated prepyloric gastric ulcer s/p exploratory laparotomy antrectomy and D1 stapled off with temporary abdominal closure and abthera in place  - 5/16: RTOR s/p bilroth 2, J tube placement, RLQ maliha drain by anastomosis extending to duodenal stump  - 5/18: BULMARO drain w/ bilious output > pt made strict NPO, BULMARO drain bilirubin 9.7, LFTs WNL  - 5/28: s/p GJ replacement   #Diet, NPO with trickle feeds 20cc/hr   - 5/23: s/p PICC by IR, started on TPN  - feeds through J port, G port to gravity  - aspiration precautions, HOB 30  #GI Prophylaxis    - protonix 40mg QD IV  #Bowel regimen    -holding    -last bowel movement 5/25 PM    /RENAL:   #urine output in critically ill    -chronic indwelling ya > new ya exchanged 5/17   -intermittent diuresis, last 5/28  #metabolic acidosis, resolved  #NOLA (baseline creatine 0.6), improving   - nephrology consulted > no RRT at this time  #Hx of solitary kidney    HEME/ONC:   #DVT prophylaxis    -Lovenox    -SCDs  #anemia  - 1pRBC 5/17 to help wean off vasopressors    Labs:     ID:  #perforated duodenal ulcer  - Nystatin powder for groin rash (5/15 - )  Completed antibiotics:  - zosyn (renal dosing) (5/14 - 5/16)  - caspofungin per primary team (5/14 - 5/20)  - Meropenem (5/16 - 5/27)  - Fluconazole 200mg q24 (end date 5/28)  #COVID positive on admission --- resolved    - completed remdesivir 2 day course    - completed isolation precautions 5/23  #Cultures    - 5/13: blood culture: NGTD    - 5/13: blood culture: NGTD    - 5/13: urine culture: E Coli  - ID following      ENDOCRINE:  #Maintain euglycemia    -FSG q6 while NPO    -Glucose goal 140-180. If above 180, will start corrective insulin sliding scale  #hypercalcemia  - HOLDING  home cinacalcet 30mg bid    MSK:  #Activity - increase as tolerated   #hx osteoporosis  - holding home Alendronate 70mg weekly on Thursdays while NPO    SKIN:  #DTI screen  -Sacral and bilateral gluteal fold friction wounds per wound care rn  -wound c/s placed    - will continue to monitor daily skin changes    PALLIATIVE:  Currently full code, palliative consult; readdress goals 5/27      LINES/DRAINS:  PIV, Midline Catheter x2 (5/20-) Ya (replaced 5/17), J tube (5/16-5/28), GJ (5/28) 8 cuffed portex trach    Discontinued lines: R radial arterial line (5/14- 5/22), LIJ CVC (5/14- 5/20), PICC  ADVANCED DIRECTIVES:  Full Code    HCP/Emergency Contact- daughter Cecilia [phone]  INDICATION FOR SICU/SDU:  perforated peptic ulcer; mechanical ventilation/vasopressors     DISPO:  SICU. Case to be discussed with attending Dr. Peters Assessment	  74F found to have perforated peptic ulcer now s/p ex lap, distal antrectomy, abthera placement requiring vasopressors. RTOR 5/16 s/p second look laparotomy, Billroth II reconstruction, gastrojejunostomy tube replaced with a jejunal feeding tube, abdomen closed at the end of the case.     NEUROLOGICAL:  #Acute pain  - IV APAP while NPO     RESPIRATORY:   #Respiratory failure, s/p tracheostomy (11/24)    - now with 8 portex cuffed trach (switched in OR on 5/14)    - tolerating T- piece    - continue duonebs    - lasix for diuresis given daily, last dose 5/28  #Decreased small bibasilar opacities/atelectasis, right greater than left on CT  - 5/16: s/p deep suctioning by anesthesia w/ copious secretions   #h/o asthma  - atrovent and albuterol   #Increased secretions  - 3% inhaled NS  # COVID+ on admission --- resolved  - s/p Remdesivir  - off contact precautions 5/24  #Activity   - increase as tolerated     CARDS:   #Nonsustained Vtach - self resolved   - cards consult: management of septic shock. Start short acting AC for afib. Start beta blockers once off pressors   #hypotension 2/2 sepsis   - off vasopressors since 5/19  - MAP >65  - Holding midodrine 10 mg Q8h while NPO  #hx afib  - transiently was in AFIB slow ventricular response (HR 40s-60s)  - holding home Xarelto 15 QD  #hx HTN  - holding home losartan 100QD  #elevated troponin  - 143 > 84, no longer trending  #TTE 11/24: EF 71%. Mild-mod AS. Trivial pericardial effusion.   - Echo 5/15: EF 30-35%, multiple left ventricular motion wall abnormalities. Possible Takotsubo vs ischemic. Global left ventricular systolic function.   - Echo 5/28: EF 65-70%, borderline pulmonary hypertension     GASTROINTESTINAL/NUTRITION:   #perforated prepyloric gastric ulcer s/p exploratory laparotomy antrectomy and D1 stapled off with temporary abdominal closure and abthera in place  - 5/16: RTOR s/p bilroth 2, J tube placement, RLQ maliha drain by anastomosis extending to duodenal stump  - 5/18: BULMARO drain w/ bilious output > pt made strict NPO, BULMARO drain bilirubin 9.7, LFTs WNL  - 5/28: s/p GJ replacement   #Diet, NPO with trickle feeds 20cc/hr   - 5/23: s/p PICC by IR, started on TPN  - feeds through J port, G port to gravity  - aspiration precautions, HOB 30  #GI Prophylaxis    - protonix 40mg QD IV  #Bowel regimen    -holding    -last bowel movement 5/25 PM    /RENAL:   #urine output in critically ill    -chronic indwelling ya > new ya exchanged 5/17   -intermittent diuresis, last 5/28  #metabolic acidosis, resolved  #NOLA (baseline creatine 0.6), improving   - nephrology consulted > no RRT at this time  #Hx of solitary kidney      Labs:   BUN/Cr- 49/0.7  -->,  50/0.7  -->    [05-29 @ 05:17]Na  144 // K  4.1 // Mg  2.0 // Phos  2.9  [05-28 @ 16:27]Na  142 // K  4.7 // Mg  2.0 // Phos  3.4      HEME/ONC:   #DVT prophylaxis    -Lovenox    -SCDs  #anemia  - 1pRBC 5/17 to help wean off vasopressors    Labs:     Labs: Hb/Hct:  7.3/23.0  (05-27 @ 22:35)  -->,  7.5/24.0  (05-29 @ 05:17)  -->                      Plts:  183  -->,  193  -->                 PTT/INR:        ID:  #perforated duodenal ulcer  - Nystatin powder for groin rash (5/15 - )  Completed antibiotics:  - zosyn (renal dosing) (5/14 - 5/16)  - caspofungin per primary team (5/14 - 5/20)  - Meropenem (5/16 - 5/27)  - Fluconazole 200mg q24 (end date 5/28)  #COVID positive on admission --- resolved    - completed remdesivir 2 day course    - completed isolation precautions 5/23  #Cultures  WBC- 9.26  --->>,  8.42  --->>,  10.71  --->>  Temp trend- 24hrs T(F): 97.5 (05-29 @ 04:00), Max: 97.9 (05-28 @ 16:00)  Current antibiotics- none    - 5/13: blood culture: NGTD    - 5/13: blood culture: NGTD    - 5/13: urine culture: E Coli  - ID following      ENDOCRINE:  #Maintain euglycemia    -FSG q6 while NPO    -Glucose goal 140-180. If above 180, will start corrective insulin sliding scale  #hypercalcemia  - HOLDING  home cinacalcet 30mg bid    MSK:  #Activity - increase as tolerated   #hx osteoporosis  - holding home Alendronate 70mg weekly on Thursdays while NPO    SKIN:  #DTI screen  -Sacral and bilateral gluteal fold friction wounds per wound care rn  -wound c/s placed    - will continue to monitor daily skin changes    PALLIATIVE:  Currently full code, palliative consult; readdress goals 5/27      LINES/DRAINS:  PIV, Midline Catheter x2 (5/20-) Ya (replaced 5/17), J tube (5/16-5/28), GJ (5/28) 8 cuffed portex trach    Discontinued lines: R radial arterial line (5/14- 5/22), LIJ CVC (5/14- 5/20), PICC  ADVANCED DIRECTIVES:  Full Code    HCP/Emergency Contact- daughter Cecilia [phone]  INDICATION FOR SICU/SDU:  perforated peptic ulcer; mechanical ventilation/vasopressors     DISPO:  SICU. Case to be discussed with attending Dr. Peters Assessment	  74F found to have perforated peptic ulcer now s/p ex lap, distal antrectomy, abthera placement requiring vasopressors. RTOR 5/16 s/p second look laparotomy, Billroth II reconstruction, gastrojejunostomy tube replaced with a jejunal feeding tube, abdomen closed at the end of the case.     NEUROLOGICAL:  #Acute pain  - IV APAP while NPO     RESPIRATORY:   #Respiratory failure, s/p tracheostomy (11/24)    - now with 8 portex cuffed trach (switched in OR on 5/14)    - tolerating T- piece    - continue duonebs    - lasix for diuresis given daily, last dose 5/28  #Decreased small bibasilar opacities/atelectasis, right greater than left on CT  #h/o asthma  - atrovent and albuterol   #Increased secretions  - 3% inhaled NS  # COVID+ on admission --- resolved  - s/p Remdesivir  - off contact precautions 5/24  #Activity   - increase as tolerated     CARDS:   #Nonsustained Vtach - self resolved   - cards consult: management of septic shock. Start short acting AC for afib. Start beta blockers once off pressors   #hypotension 2/2 sepsis, resolved   - MAP >65  - Holding midodrine 10 mg Q8h while NPO  #hx afib  - transiently was in AFIB slow ventricular response (HR 40s-60s)  - holding home Xarelto 15 QD  #hx HTN  - holding home losartan 100QD  #elevated troponin  - 143 > 84, no longer trending  #TTE 11/24: EF 71%. Mild-mod AS. Trivial pericardial effusion.   - Echo 5/15: EF 30-35%, multiple left ventricular motion wall abnormalities. Possible Takotsubo vs ischemic. Global left ventricular systolic function.   - Echo 5/28: EF 65-70%, borderline pulmonary hypertension     GASTROINTESTINAL/NUTRITION:   #perforated prepyloric gastric ulcer s/p exploratory laparotomy antrectomy and D1 stapled off with temporary abdominal closure and abthera in place  - 5/16: RTOR s/p bilroth 2, J tube placement, RLQ maliha drain by anastomosis extending to duodenal stump  - 5/18: BULMARO drain w/ bilious output > pt made strict NPO, BULMARO drain bilirubin 9.7, LFTs WNL  - 5/28: s/p GJ replacement   #Diet, NPO with trickle feeds 20cc/hr   - 5/23: s/p PICC by IR, started on TPN  - feeds through J port, G port to gravity  - aspiration precautions, HOB 30  #GI Prophylaxis    - protonix 40mg QD IV  #Bowel regimen    -holding    -last bowel movement 5/25 PM    /RENAL:   #urine output in critically ill    -chronic indwelling ya > new ya exchanged 5/17   -intermittent diuresis, last 5/28  #metabolic acidosis, resolved  #NOLA (baseline creatine 0.6), improving   - nephrology consulted > no RRT at this time  #Hx of solitary kidney    Labs:   BUN/Cr- 49/0.7  -->,  50/0.7  -->    [05-29 @ 05:17]Na  144 // K  4.1 // Mg  2.0 // Phos  2.9      HEME/ONC:   #DVT prophylaxis    -Lovenox    -SCDs    Labs: Hb/Hct:  7.3/23.0  (05-27 @ 22:35)  -->,  7.5/24.0  (05-29 @ 05:17)  -->                      Plts:  183  -->,  193  -->                 PTT/INR:        ID:  #perforated duodenal ulcer  - Nystatin powder for groin rash (5/15 - )  Completed antibiotics:  - zosyn (renal dosing) (5/14 - 5/16)  - caspofungin per primary team (5/14 - 5/20)  - Meropenem (5/16 - 5/27)  - Fluconazole 200mg q24 (end date 5/28)  #COVID positive on admission --- resolved    - completed remdesivir 2 day course    - completed isolation precautions 5/23  #Cultures  WBC- 9.26  --->>,  8.42  --->>,  10.71  --->>  Temp trend- 24hrs T(F): 97.5 (05-29 @ 04:00), Max: 97.9 (05-28 @ 16:00)  Current antibiotics- none    - 5/13: blood culture: NGTD    - 5/13: blood culture: NGTD    - 5/13: urine culture: E Coli  - ID following      ENDOCRINE:  #Maintain euglycemia    -FSG q6 while NPO    -Glucose goal 140-180. If above 180, will start corrective insulin sliding scale  #hypercalcemia  - HOLDING  home cinacalcet 30mg bid    MSK:  #Activity - increase as tolerated   #hx osteoporosis  - holding home Alendronate 70mg weekly on Thursdays while NPO    SKIN:  #DTI screen  -Sacral and bilateral gluteal fold friction wounds per wound care rn  -wound c/s placed    - will continue to monitor daily skin changes    PALLIATIVE:  Currently full code, palliative consult; readdress goals 5/27      LINES/DRAINS:  PIV, Midline Catheter x2 (5/20-) Ya (replaced 5/17), GJ (5/28) 8 cuffed portex trach  ADVANCED DIRECTIVES:  Full Code    HCP/Emergency Contact- daughter Cecilia [phone]  INDICATION FOR SICU/SDU:  perforated peptic ulcer; mechanical ventilation/vasopressors   DISPO:  SDU. Case to be discussed with attending Dr. Peters 74F found to have perforated peptic ulcer now s/p ex lap, distal antrectomy, abthera placement requiring vasopressors. RTOR 5/16 s/p second look laparotomy, Billroth II reconstruction, gastrojejunostomy tube replaced with a jejunal feeding tube, abdomen closed at the end of the case. Patient now s/p GJ with GI on 5/28.     NEUROLOGICAL:  #Acute pain  - PO APAP prn via G tube    RESPIRATORY:   #Respiratory failure, s/p tracheostomy (11/24)    - now with 8 portex cuffed trach (switched in OR on 5/14)    - tolerating T- piece    - continue duonebs   #Decreased small bibasilar opacities/atelectasis, right greater than left on CT  - repeat CT chest pending today  #h/o asthma  - atrovent and albuterol   #Increased secretions  - 3% inhaled NS  # COVID+ on admission --- resolved  - s/p Remdesivir  - off contact precautions 5/24  #Activity   - increase as tolerated     CARDS:   #Nonsustained Vtach - self resolved   - cards consult: management of septic shock. Start short acting AC for afib. Start beta blockers once off pressors   #hypotension 2/2 sepsis, resolved   - MAP >65  #hx afib  - resume home Xarelto 15 QD  #hx HTN  - holding home losartan 100QD  #elevated troponin  - 143 > 84, no longer trending  #TTE 11/24: EF 71%. Mild-mod AS. Trivial pericardial effusion.   - Echo 5/15: EF 30-35%, multiple left ventricular motion wall abnormalities. Possible Takotsubo vs ischemic. Global left ventricular systolic function.   - Echo 5/28: EF 65-70%, borderline pulmonary hypertension     GASTROINTESTINAL/NUTRITION:   #perforated prepyloric gastric ulcer s/p exploratory laparotomy antrectomy and D1 stapled off with temporary abdominal closure and abthera in place  - 5/16: RTOR s/p bilroth 2, J tube placement, RLQ maliha drain by anastomosis extending to duodenal stump  - 5/18: BULMARO drain w/ bilious output > pt made strict NPO, BULMARO drain bilirubin 9.7, LFTs WNL  - 5/28: s/p GJ replacement   #Diet, NPO with trickle feeds 20cc/hr   - 5/23: s/p PICC by IR, started on TPN  - feeds through J port, G port to gravity with meds  - aspiration precautions, HOB 30  #GI Prophylaxis    - protonix 40mg QD IV  #Bowel regimen    -holding    -last bowel movement 5/25 PM    /RENAL:   #urine output in critically ill    -chronic indwelling ya > new ya exchanged 5/17   -intermittent diuresis, last 5/28  #metabolic acidosis, resolved  #NOLA (baseline creatine 0.6), improving   - nephrology consulted > no RRT at this time  #Hx of solitary kidney    Labs:   BUN/Cr- 49/0.7  -->,  50/0.7  -->    [05-29 @ 05:17]Na  144 // K  4.1 // Mg  2.0 // Phos  2.9      HEME/ONC:   #DVT prophylaxis    - Xarelto to resume today     -SCDs    Labs: Hb/Hct:  7.3/23.0  (05-27 @ 22:35)  -->,  7.5/24.0  (05-29 @ 05:17)  -->                      Plts:  183  -->,  193  -->                 PTT/INR:        ID:  #perforated duodenal ulcer  - Nystatin powder for groin rash (5/15 - )  Completed antibiotics:  - zosyn (renal dosing) (5/14 - 5/16)  - caspofungin per primary team (5/14 - 5/20)  - Meropenem (5/16 - 5/27)  - Fluconazole 200mg q24 (end date 5/28)  #COVID positive on admission --- resolved    - completed remdesivir 2 day course    - completed isolation precautions 5/23  #Cultures  WBC- 9.26  --->>,  8.42  --->>,  10.71  --->>  Temp trend- 24hrs T(F): 97.5 (05-29 @ 04:00), Max: 97.9 (05-28 @ 16:00)  Current antibiotics- none    - 5/13: blood culture: NGTD    - 5/13: blood culture: NGTD    - 5/13: urine culture: E Coli  - ID following      ENDOCRINE:  #Maintain euglycemia    -FSG q6 while NPO    -Glucose goal 140-180. If above 180, will start corrective insulin sliding scale  #hypercalcemia  - HOLDING  home cinacalcet 30mg bid    MSK:  #Activity - increase as tolerated   #hx osteoporosis  - holding home Alendronate 70mg weekly on Thursdays while NPO    SKIN:  #DTI screen  -Sacral and bilateral gluteal fold friction wounds per wound care rn  -wound c/s placed    - will continue to monitor daily skin changes    PALLIATIVE:  Currently full code, palliative consult; readdress goals 5/27    LINES/DRAINS:  PIV, Midline Catheter x2 (5/20-) Ya (replaced 5/17), GJ (5/28) 8 cuffed portex trach  ADVANCED DIRECTIVES:  Full Code    HCP/Emergency Contact- daughter Cecilia [phone]  INDICATION FOR SICU/SDU:  perforated peptic ulcer; mechanical ventilation/vasopressors   DISPO:  4C. Case to be discussed with attending Dr. Peters

## 2025-05-29 NOTE — CHART NOTE - NSCHARTNOTEFT_GEN_A_CORE
SICU Transfer Note    PATRICIA SPRAGUE  74y (1950)  [id number]      Transfer from: SICU  Transfer to: Surgery- 4C [facility]      SICU COURSE:  05-13 - admitted to SICU service  05-14 - s/p OR for exploratory laparotomy, started remdesivir for covid +, echo ordered  5/15 - Echo showing EF 30-35%, cardiology consulted. Pending RTOR. Remains on vasopressors, weaning.  5/16: S/p Exploratory laparotomy, bilroth 2, J tube placement. Episode of wheezing. Changed to meropenem per ID. AFIB slow ventricular response HR 40s-60s, remains on low dose pressors   5/17: Tolerating T piece, trickle feed started via J tube, s/p 1u PRBC to wean off levophed, remains on vasopressin, IVL  5/18: Midodrine 10mg q8 started, weaned off levophed. BULMARO drain bilious output > strict NPO, f/u fluid composition   5/19: CTAP - No evidence of drainable fluid collection.   5/20: Trickle feeds restarted but discontinued 2/2 high gastric residual, NGT kep to suction. Caspofungin discontinued, narrowed to diclucan.  5/21: GI consulted for GJ replacement  5/23: PICC line placed with IR, started TPN in PM, IVL  5/24: diuresis with lasix. albumin 55 250cc x 1 given overnight.  5/25- 25% 50cc albumin x 1 given for SBP 90s, improved to 120s. Diuresis with 20mg lasix x 1, >1.8L response. IPV increased to Q6 hours.  5/26: Diuresis with 20mg lasix, given 25% 50cc, >1L response.    5/28: S/p GJ tube placement. D/c diflucan. 20 lasix with good response.  5/29: CT A/P with PO/IV contrast ordered. DG to 4C.         PAST MEDICAL & SURGICAL HISTORY:  HTN (hypertension)  Diabetes mellitus  Obesity  Gastroesophageal reflux disease  Active asthma  Generalized OA  Afib  H/O hernia repair  2011 and revised in 2013  History of cholecystectomy  Status post debridement  H/O tracheostomy  S/P gastrostomy  S/P jejunostomy        Allergies  No Known Allergies  Intolerances      MEDICATIONS  (STANDING):  albuterol/ipratropium for Nebulization 3 milliLiter(s) Nebulizer every 6 hours  chlorhexidine 2% Cloths 1 Application(s) Topical <User Schedule>  enoxaparin Injectable 40 milliGRAM(s) SubCutaneous every 24 hours  iohexol 300 mG (iodine)/mL Oral Solution 30 milliLiter(s) Oral once  lipid, fat emulsion (Fish Oil and Plant Based) 20% Infusion 0.5045 Gm/kG/Day (20.8 mL/Hr) IV Continuous <Continuous>  nystatin Powder 1 Application(s) Topical two times a day  pantoprazole  Injectable 40 milliGRAM(s) IV Push daily  Parenteral Nutrition - Adult 1 Each (58 mL/Hr) TPN Continuous <Continuous>  Parenteral Nutrition - Adult 1 Each (58 mL/Hr) TPN Continuous <Continuous>  sodium chloride 3%  Inhalation 4 milliLiter(s) Inhalation every 6 hours    MEDICATIONS  (PRN):  acetaminophen   Oral Liquid .. 650 milliGRAM(s) Oral every 6 hours PRN Mild Pain (1 - 3)  sodium chloride 0.9% lock flush 10 milliLiter(s) IV Push every 1 hour PRN Pre/post blood products, medications, blood draw, and to maintain line patency      Vital Signs Last 24 Hrs  T(C): 36.1 (29 May 2025 08:00), Max: 36.6 (28 May 2025 16:00)  T(F): 97 (29 May 2025 08:00), Max: 97.9 (28 May 2025 16:00)  HR: 83 (29 May 2025 08:00) (83 - 96)  BP: 117/57 (29 May 2025 08:00) (101/52 - 128/69)  BP(mean): 80 (29 May 2025 08:00) (78 - 95)  RR: 24 (29 May 2025 08:00) (7 - 31)  SpO2: 98% (29 May 2025 08:00) (92% - 100%)    Parameters below as of 29 May 2025 04:00  Patient On (Oxygen Delivery Method): T-piece    O2 Concentration (%): 50  I&O's Summary    28 May 2025 07:01  -  29 May 2025 07:00  --------------------------------------------------------  IN: 1562.8 mL / OUT: 3867.5 mL / NET: -2304.7 mL    29 May 2025 07:01  -  29 May 2025 10:44  --------------------------------------------------------  IN: 116 mL / OUT: 30 mL / NET: 86 mL      LABS:                        7.5    10.71 )-----------( 193      ( 29 May 2025 05:17 )             24.0       05-29    144  |  110  |  50[H]  ----------------------------<  114[H]  4.1   |  23  |  0.7    Ca    9.4      29 May 2025 05:17  Phos  2.9     05-29  Mg     2.0     05-29        PT/INR - ( 27 May 2025 22:35 )   PT: 11.90 sec;   INR: 1.01 ratio    PTT - ( 27 May 2025 22:35 )  PTT:33.7 sec                            Assessment & Plan:  74F found to have perforated peptic ulcer now s/p ex lap, distal antrectomy, abthera placement requiring vasopressors. RTOR 5/16 s/p second look laparotomy, Billroth II reconstruction, gastrojejunostomy tube replaced with a jejunal feeding tube, abdomen closed at the end of the case. Patient now s/p GJ with GI on 5/28.     NEUROLOGICAL:  #Acute pain  - PO APAP prn via G tube    RESPIRATORY:   #Respiratory failure, s/p tracheostomy (11/24)    - now with 8 portex cuffed trach (switched in OR on 5/14)    - tolerating T- piece    - continue duonebs   #Decreased small bibasilar opacities/atelectasis, right greater than left on CT  - repeat CT chest pending today  #h/o asthma  - atrovent and albuterol   #Increased secretions  - 3% inhaled NS  # COVID+ on admission --- resolved  - s/p Remdesivir  - off contact precautions 5/24  #Activity   - increase as tolerated     CARDS:   #Nonsustained Vtach - self resolved   - cards consult: management of septic shock. Start short acting AC for afib. Start beta blockers once off pressors   #hypotension 2/2 sepsis, resolved   - MAP >65  #hx afib  - holding home Xarelto 15 QD > f/u ACS if ok to resume via G tube  #hx HTN  - holding home losartan 100QD  #elevated troponin  - 143 > 84, no longer trending  #TTE 11/24: EF 71%. Mild-mod AS. Trivial pericardial effusion.   - Echo 5/15: EF 30-35%, multiple left ventricular motion wall abnormalities. Possible Takotsubo vs ischemic. Global left ventricular systolic function.   - Echo 5/28: EF 65-70%, borderline pulmonary hypertension     GASTROINTESTINAL/NUTRITION:   #perforated prepyloric gastric ulcer s/p exploratory laparotomy antrectomy and D1 stapled off with temporary abdominal closure and abthera in place  - 5/16: RTOR s/p bilroth 2, J tube placement, RLQ maliha drain by anastomosis extending to duodenal stump  - 5/18: BULMARO drain w/ bilious output > pt made strict NPO, BULMARO drain bilirubin 9.7, LFTs WNL  - 5/28: s/p GJ replacement   #Diet, NPO with trickle feeds 20cc/hr   - 5/23: s/p PICC by IR, started on TPN  - feeds through J port, G port to gravity with meds  - aspiration precautions, HOB 30  #GI Prophylaxis    - protonix 40mg QD IV  #Bowel regimen    -holding    -last bowel movement 5/25 PM    /RENAL:   #urine output in critically ill    -chronic indwelling ya > new ya exchanged 5/17   -intermittent diuresis, last 5/28  #metabolic acidosis, resolved  #NOLA (baseline creatine 0.6), improving   - nephrology consulted > no RRT at this time  #Hx of solitary kidney    Labs:   BUN/Cr- 49/0.7  -->,  50/0.7  -->    [05-29 @ 05:17]Na  144 // K  4.1 // Mg  2.0 // Phos  2.9      HEME/ONC:   #DVT prophylaxis    -Lovenox > f/u resuming Xarelto    -SCDs    Labs: Hb/Hct:  7.3/23.0  (05-27 @ 22:35)  -->,  7.5/24.0  (05-29 @ 05:17)  -->                      Plts:  183  -->,  193  -->                 PTT/INR:        ID:  #perforated duodenal ulcer  - Nystatin powder for groin rash (5/15 - )  Completed antibiotics:  - zosyn (renal dosing) (5/14 - 5/16)  - caspofungin per primary team (5/14 - 5/20)  - Meropenem (5/16 - 5/27)  - Fluconazole 200mg q24 (end date 5/28)  #COVID positive on admission --- resolved    - completed remdesivir 2 day course    - completed isolation precautions 5/23  #Cultures  WBC- 9.26  --->>,  8.42  --->>,  10.71  --->>  Temp trend- 24hrs T(F): 97.5 (05-29 @ 04:00), Max: 97.9 (05-28 @ 16:00)  Current antibiotics- none    - 5/13: blood culture: NGTD    - 5/13: blood culture: NGTD    - 5/13: urine culture: E Coli  - ID following      ENDOCRINE:  #Maintain euglycemia    -FSG q6 while NPO    -Glucose goal 140-180. If above 180, will start corrective insulin sliding scale  #hypercalcemia  - HOLDING  home cinacalcet 30mg bid    MSK:  #Activity - increase as tolerated   #hx osteoporosis  - holding home Alendronate 70mg weekly on Thursdays while NPO    SKIN:  #DTI screen  -Sacral and bilateral gluteal fold friction wounds per wound care rn  -wound c/s placed    - will continue to monitor daily skin changes    PALLIATIVE:  Currently full code, palliative consult; readdress goals 5/27        Follow Up:  -430 AM labs for TPN  - CT IV/PO contrast abdomen/pelvis ordered for interval f/u  - CT chest ordered  - f/u resuming Xarelto with ACS attending  - started on trickle feeds, f/u if tolerating  - maintain chronic ya   - dispo planning        Signed out to:  Date: 5/29  Time: SICU Transfer Note    PATRICIA SPRAGUE  74y (1950)  [id number]      Transfer from: SICU  Transfer to: Surgery- 4[facility]      SICU COURSE:  05-13 - admitted to SICU service  05-14 - s/p OR for exploratory laparotomy, started remdesivir for covid +, echo ordered  5/15 - Echo showing EF 30-35%, cardiology consulted. Pending RTOR. Remains on vasopressors, weaning.  5/16: S/p Exploratory laparotomy, bilroth 2, J tube placement. Episode of wheezing. Changed to meropenem per ID. AFIB slow ventricular response HR 40s-60s, remains on low dose pressors   5/17: Tolerating T piece, trickle feed started via J tube, s/p 1u PRBC to wean off levophed, remains on vasopressin, IVL  5/18: Midodrine 10mg q8 started, weaned off levophed. BULMARO drain bilious output > strict NPO, f/u fluid composition   5/19: CTAP - No evidence of drainable fluid collection.   5/20: Trickle feeds restarted but discontinued 2/2 high gastric residual, NGT kep to suction. Caspofungin discontinued, narrowed to diclucan.  5/21: GI consulted for GJ replacement  5/23: PICC line placed with IR, started TPN in PM, IVL  5/24: diuresis with lasix. albumin 55 250cc x 1 given overnight.  5/25- 25% 50cc albumin x 1 given for SBP 90s, improved to 120s. Diuresis with 20mg lasix x 1, >1.8L response. IPV increased to Q6 hours.  5/26: Diuresis with 20mg lasix, given 25% 50cc, >1L response.    5/28: S/p GJ tube placement. D/c diflucan. 20 lasix with good response.  5/29: CT A/P with PO/IV contrast ordered. DG to 4C.       PAST MEDICAL & SURGICAL HISTORY:  HTN (hypertension)  Diabetes mellitus  Obesity  Gastroesophageal reflux disease  Active asthma  Generalized OA  Afib  H/O hernia repair  2011 and revised in 2013  History of cholecystectomy  Status post debridement  H/O tracheostomy  S/P gastrostomy  S/P jejunostomy        Allergies  No Known Allergies  Intolerances      MEDICATIONS  (STANDING):  albuterol/ipratropium for Nebulization 3 milliLiter(s) Nebulizer every 6 hours  chlorhexidine 2% Cloths 1 Application(s) Topical <User Schedule>  enoxaparin Injectable 40 milliGRAM(s) SubCutaneous every 24 hours  iohexol 300 mG (iodine)/mL Oral Solution 30 milliLiter(s) Oral once  lipid, fat emulsion (Fish Oil and Plant Based) 20% Infusion 0.5045 Gm/kG/Day (20.8 mL/Hr) IV Continuous <Continuous>  nystatin Powder 1 Application(s) Topical two times a day  pantoprazole  Injectable 40 milliGRAM(s) IV Push daily  Parenteral Nutrition - Adult 1 Each (58 mL/Hr) TPN Continuous <Continuous>  Parenteral Nutrition - Adult 1 Each (58 mL/Hr) TPN Continuous <Continuous>  sodium chloride 3%  Inhalation 4 milliLiter(s) Inhalation every 6 hours    MEDICATIONS  (PRN):  acetaminophen   Oral Liquid .. 650 milliGRAM(s) Oral every 6 hours PRN Mild Pain (1 - 3)  sodium chloride 0.9% lock flush 10 milliLiter(s) IV Push every 1 hour PRN Pre/post blood products, medications, blood draw, and to maintain line patency      Vital Signs Last 24 Hrs  T(C): 36.1 (29 May 2025 08:00), Max: 36.6 (28 May 2025 16:00)  T(F): 97 (29 May 2025 08:00), Max: 97.9 (28 May 2025 16:00)  HR: 83 (29 May 2025 08:00) (83 - 96)  BP: 117/57 (29 May 2025 08:00) (101/52 - 128/69)  BP(mean): 80 (29 May 2025 08:00) (78 - 95)  RR: 24 (29 May 2025 08:00) (7 - 31)  SpO2: 98% (29 May 2025 08:00) (92% - 100%)    Parameters below as of 29 May 2025 04:00  Patient On (Oxygen Delivery Method): T-piece    O2 Concentration (%): 50  I&O's Summary    28 May 2025 07:01  -  29 May 2025 07:00  --------------------------------------------------------  IN: 1562.8 mL / OUT: 3867.5 mL / NET: -2304.7 mL    29 May 2025 07:01  -  29 May 2025 10:44  --------------------------------------------------------  IN: 116 mL / OUT: 30 mL / NET: 86 mL      LABS:                        7.5    10.71 )-----------( 193      ( 29 May 2025 05:17 )             24.0       05-29    144  |  110  |  50[H]  ----------------------------<  114[H]  4.1   |  23  |  0.7    Ca    9.4      29 May 2025 05:17  Phos  2.9     05-29  Mg     2.0     05-29        PT/INR - ( 27 May 2025 22:35 )   PT: 11.90 sec;   INR: 1.01 ratio    PTT - ( 27 May 2025 22:35 )  PTT:33.7 sec                            Assessment & Plan:  74F found to have perforated peptic ulcer now s/p ex lap, distal antrectomy, abthera placement requiring vasopressors. RTOR 5/16 s/p second look laparotomy, Billroth II reconstruction, gastrojejunostomy tube replaced with a jejunal feeding tube, abdomen closed at the end of the case. Patient now s/p GJ with GI on 5/28.     NEUROLOGICAL:  #Acute pain  - PO APAP prn via G tube    RESPIRATORY:   #Respiratory failure, s/p tracheostomy (11/24)    - now with 8 portex cuffed trach (switched in OR on 5/14)    - tolerating T- piece    - continue duonebs   #Decreased small bibasilar opacities/atelectasis, right greater than left on CT  - repeat CT chest pending today  #h/o asthma  - atrovent and albuterol   #Increased secretions  - 3% inhaled NS  # COVID+ on admission --- resolved  - s/p Remdesivir  - off contact precautions 5/24  #Activity   - increase as tolerated     CARDS:   #Nonsustained Vtach - self resolved   - cards consult: management of septic shock. Start short acting AC for afib. Start beta blockers once off pressors   #hypotension 2/2 sepsis, resolved   - MAP >65  #hx afib  - resume home Xarelto 15 QD  #hx HTN  - holding home losartan 100QD  #elevated troponin  - 143 > 84, no longer trending  #TTE 11/24: EF 71%. Mild-mod AS. Trivial pericardial effusion.   - Echo 5/15: EF 30-35%, multiple left ventricular motion wall abnormalities. Possible Takotsubo vs ischemic. Global left ventricular systolic function.   - Echo 5/28: EF 65-70%, borderline pulmonary hypertension     GASTROINTESTINAL/NUTRITION:   #perforated prepyloric gastric ulcer s/p exploratory laparotomy antrectomy and D1 stapled off with temporary abdominal closure and abthera in place  - 5/16: RTOR s/p bilroth 2, J tube placement, RLQ maliha drain by anastomosis extending to duodenal stump  - 5/18: BULMARO drain w/ bilious output > pt made strict NPO, BULMARO drain bilirubin 9.7, LFTs WNL  - 5/28: s/p GJ replacement   #Diet, NPO with trickle feeds 20cc/hr   - 5/23: s/p PICC by IR, started on TPN  - feeds through J port, G port to gravity with meds  - aspiration precautions, HOB 30  #GI Prophylaxis    - protonix 40mg QD IV  #Bowel regimen    -holding    -last bowel movement 5/25 PM    /RENAL:   #urine output in critically ill    -chronic indwelling ya > new ya exchanged 5/17   -intermittent diuresis, last 5/28  #metabolic acidosis, resolved  #NOLA (baseline creatine 0.6), improving   - nephrology consulted > no RRT at this time  #Hx of solitary kidney    Labs:   BUN/Cr- 49/0.7  -->,  50/0.7  -->    [05-29 @ 05:17]Na  144 // K  4.1 // Mg  2.0 // Phos  2.9      HEME/ONC:   #DVT prophylaxis    - Xarelto to resume today     -SCDs    Labs: Hb/Hct:  7.3/23.0  (05-27 @ 22:35)  -->,  7.5/24.0  (05-29 @ 05:17)  -->                      Plts:  183  -->,  193  -->                 PTT/INR:        ID:  #perforated duodenal ulcer  - Nystatin powder for groin rash (5/15 - )  Completed antibiotics:  - zosyn (renal dosing) (5/14 - 5/16)  - caspofungin per primary team (5/14 - 5/20)  - Meropenem (5/16 - 5/27)  - Fluconazole 200mg q24 (end date 5/28)  #COVID positive on admission --- resolved    - completed remdesivir 2 day course    - completed isolation precautions 5/23  #Cultures  WBC- 9.26  --->>,  8.42  --->>,  10.71  --->>  Temp trend- 24hrs T(F): 97.5 (05-29 @ 04:00), Max: 97.9 (05-28 @ 16:00)  Current antibiotics- none    - 5/13: blood culture: NGTD    - 5/13: blood culture: NGTD    - 5/13: urine culture: E Coli  - ID following      ENDOCRINE:  #Maintain euglycemia    -FSG q6 while NPO    -Glucose goal 140-180. If above 180, will start corrective insulin sliding scale  #hypercalcemia  - HOLDING  home cinacalcet 30mg bid    MSK:  #Activity - increase as tolerated   #hx osteoporosis  - holding home Alendronate 70mg weekly on Thursdays while NPO    SKIN:  #DTI screen  -Sacral and bilateral gluteal fold friction wounds per wound care rn  -wound c/s placed    - will continue to monitor daily skin changes    PALLIATIVE:  Currently full code, palliative consult; readdress goals 5/27        Follow Up:  -430 AM labs for TPN  - CT IV/PO contrast abdomen/pelvis ordered for interval f/u  - CT chest ordered  - started on trickle feeds, f/u if tolerating  - maintain chronic ya   - dispo planning        Signed out to: Lara REILLY  Date: 5/29  Time: 11AM

## 2025-05-29 NOTE — PROGRESS NOTE ADULT - SUBJECTIVE AND OBJECTIVE BOX
Gastroenterology progress note:     Patient is a 74y old  Female who presents with a chief complaint of Septic (16 May 2025 12:06)       Admitted on: 05-13-25    We are following the patient for:       Interval History:    No acute events overnight.   - Diet -   - last BM -   - Abdominal pain -       PAST MEDICAL & SURGICAL HISTORY:  HTN (hypertension)      Diabetes mellitus      Obesity      Gastroesophageal reflux disease      Active asthma      Generalized OA      Afib      H/O hernia repair  2011 and revised in 2013      History of cholecystectomy      Status post debridement      H/O tracheostomy      S/P gastrostomy      S/P jejunostomy          MEDICATIONS  (STANDING):  albuterol/ipratropium for Nebulization 3 milliLiter(s) Nebulizer every 6 hours  chlorhexidine 2% Cloths 1 Application(s) Topical <User Schedule>  lipid, fat emulsion (Fish Oil and Plant Based) 20% Infusion 0.5045 Gm/kG/Day (20.8 mL/Hr) IV Continuous <Continuous>  nystatin Powder 1 Application(s) Topical two times a day  pantoprazole  Injectable 40 milliGRAM(s) IV Push daily  Parenteral Nutrition - Adult 1 Each (58 mL/Hr) TPN Continuous <Continuous>  Parenteral Nutrition - Adult 1 Each (58 mL/Hr) TPN Continuous <Continuous>  rivaroxaban 20 milliGRAM(s) Oral with dinner  sodium chloride 3%  Inhalation 4 milliLiter(s) Inhalation every 6 hours    MEDICATIONS  (PRN):  acetaminophen   Oral Liquid .. 650 milliGRAM(s) Oral every 6 hours PRN Mild Pain (1 - 3)  sodium chloride 0.9% lock flush 10 milliLiter(s) IV Push every 1 hour PRN Pre/post blood products, medications, blood draw, and to maintain line patency      Allergies  No Known Allergies      Review of Systems:   Cardiovascular:  No Chest Pain, No Palpitations  Respiratory:  No Cough, No Dyspnea  Gastrointestinal:  As described in HPI  Skin:  No Skin Lesions, No Jaundice  Neuro:  No Syncope, No Dizziness    Physical Examination:  T(C): 35.8 (05-29-25 @ 12:00), Max: 36.6 (05-28-25 @ 16:00)  HR: 87 (05-29-25 @ 12:00) (83 - 96)  BP: 125/61 (05-29-25 @ 12:00) (101/52 - 128/69)  RR: 9 (05-29-25 @ 12:00) (7 - 31)  SpO2: 96% (05-29-25 @ 12:00) (92% - 100%)      05-28-25 @ 07:01  -  05-29-25 @ 07:00  --------------------------------------------------------  IN: 1562.8 mL / OUT: 3867.5 mL / NET: -2304.7 mL    05-29-25 @ 07:01  -  05-29-25 @ 13:49  --------------------------------------------------------  IN: 408 mL / OUT: 95 mL / NET: 313 mL        GENERAL: AAOx3, no acute distress.  HEAD:  Atraumatic, Normocephalic  EYES: conjunctiva and sclera clear  NECK: Supple, no JVD or thyromegaly  CHEST/LUNG: Clear to auscultation bilaterally; No wheeze, rhonchi, or rales  HEART: Regular rate and rhythm; normal S1, S2, No murmurs.  ABDOMEN: Soft, nontender, nondistended; Bowel sounds present  NEUROLOGY: No asterixis or tremor.   SKIN: Intact, no jaundice     Data:                        7.5    10.71 )-----------( 193      ( 29 May 2025 05:17 )             24.0     Hgb trend:  7.5  05-29-25 @ 05:17  7.3  05-27-25 @ 22:35  7.4  05-27-25 @ 05:20      05-26-25 @ 07:01  -  05-27-25 @ 07:00  --------------------------------------------------------  IN: 50 mL      05-29    144  |  110  |  50[H]  ----------------------------<  114[H]  4.1   |  23  |  0.7    Ca    9.4      29 May 2025 05:17  Phos  2.9     05-29  Mg     2.0     05-29      Liver panel trend:  TBili 0.8   /   AST 17   /   ALT 8   /   AlkP 112   /   Tptn 4.5   /   Alb 2.7    /   DBili 0.3      05-27  TBili 0.7   /   AST 15   /   ALT 7   /   AlkP 95   /   Tptn 4.4   /   Alb 2.7    /   DBili --      05-26  TBili 0.4   /   AST 10   /   ALT 9   /   AlkP 91   /   Tptn 4.1   /   Alb 2.1    /   DBili 0.2      05-21      PT/INR - ( 27 May 2025 22:35 )   PT: 11.90 sec;   INR: 1.01 ratio         PTT - ( 27 May 2025 22:35 )  PTT:33.7 sec       Radiology:       Gastroenterology progress note:     Patient is a 74y old  Female who presents with a chief complaint of Septic (16 May 2025 12:06)       Admitted on: 05-13-25    We are following the patient for:   pegj     Interval History:    No acute events overnight.   s/p successful PEGJ replacement without compications       PAST MEDICAL & SURGICAL HISTORY:  HTN (hypertension)      Diabetes mellitus      Obesity      Gastroesophageal reflux disease      Active asthma      Generalized OA      Afib      H/O hernia repair  2011 and revised in 2013      History of cholecystectomy      Status post debridement      H/O tracheostomy      S/P gastrostomy      S/P jejunostomy          MEDICATIONS  (STANDING):  albuterol/ipratropium for Nebulization 3 milliLiter(s) Nebulizer every 6 hours  chlorhexidine 2% Cloths 1 Application(s) Topical <User Schedule>  lipid, fat emulsion (Fish Oil and Plant Based) 20% Infusion 0.5045 Gm/kG/Day (20.8 mL/Hr) IV Continuous <Continuous>  nystatin Powder 1 Application(s) Topical two times a day  pantoprazole  Injectable 40 milliGRAM(s) IV Push daily  Parenteral Nutrition - Adult 1 Each (58 mL/Hr) TPN Continuous <Continuous>  Parenteral Nutrition - Adult 1 Each (58 mL/Hr) TPN Continuous <Continuous>  rivaroxaban 20 milliGRAM(s) Oral with dinner  sodium chloride 3%  Inhalation 4 milliLiter(s) Inhalation every 6 hours    MEDICATIONS  (PRN):  acetaminophen   Oral Liquid .. 650 milliGRAM(s) Oral every 6 hours PRN Mild Pain (1 - 3)  sodium chloride 0.9% lock flush 10 milliLiter(s) IV Push every 1 hour PRN Pre/post blood products, medications, blood draw, and to maintain line patency      Allergies  No Known Allergies      Review of Systems:   unable to obtain    Physical Examination:  T(C): 35.8 (05-29-25 @ 12:00), Max: 36.6 (05-28-25 @ 16:00)  HR: 87 (05-29-25 @ 12:00) (83 - 96)  BP: 125/61 (05-29-25 @ 12:00) (101/52 - 128/69)  RR: 9 (05-29-25 @ 12:00) (7 - 31)  SpO2: 96% (05-29-25 @ 12:00) (92% - 100%)      05-28-25 @ 07:01  -  05-29-25 @ 07:00  --------------------------------------------------------  IN: 1562.8 mL / OUT: 3867.5 mL / NET: -2304.7 mL    05-29-25 @ 07:01  -  05-29-25 @ 13:49  --------------------------------------------------------  IN: 408 mL / OUT: 95 mL / NET: 313 mL        GENERAL: no acute distress.  HEAD:  Atraumatic, Normocephalic  EYES: conjunctiva and sclera clear  NECK: Supple, no JVD or thyromegaly  CHEST/LUNG: Clear to auscultation bilaterally; No wheeze, rhonchi, or rales  HEART: Regular rate and rhythm; normal S1, S2, No murmurs.  ABDOMEN: Soft, nontender, nondistended; PEGJ in place, post surcial changes noted      Data:                        7.5    10.71 )-----------( 193      ( 29 May 2025 05:17 )             24.0     Hgb trend:  7.5  05-29-25 @ 05:17  7.3  05-27-25 @ 22:35  7.4  05-27-25 @ 05:20      05-26-25 @ 07:01  -  05-27-25 @ 07:00  --------------------------------------------------------  IN: 50 mL      05-29    144  |  110  |  50[H]  ----------------------------<  114[H]  4.1   |  23  |  0.7    Ca    9.4      29 May 2025 05:17  Phos  2.9     05-29  Mg     2.0     05-29      Liver panel trend:  TBili 0.8   /   AST 17   /   ALT 8   /   AlkP 112   /   Tptn 4.5   /   Alb 2.7    /   DBili 0.3      05-27  TBili 0.7   /   AST 15   /   ALT 7   /   AlkP 95   /   Tptn 4.4   /   Alb 2.7    /   DBili --      05-26  TBili 0.4   /   AST 10   /   ALT 9   /   AlkP 91   /   Tptn 4.1   /   Alb 2.1    /   DBili 0.2      05-21      PT/INR - ( 27 May 2025 22:35 )   PT: 11.90 sec;   INR: 1.01 ratio         PTT - ( 27 May 2025 22:35 )  PTT:33.7 sec       Radiology:       Gastroenterology progress note:     Patient is a 74y old  Female who presents with a chief complaint of Septic (16 May 2025 12:06)       Admitted on: 05-13-25    We are following the patient for:   pegj     Interval History:    No acute events overnight.   s/p successful PEGJ replacement without complications       PAST MEDICAL & SURGICAL HISTORY:  HTN (hypertension)      Diabetes mellitus      Obesity      Gastroesophageal reflux disease      Active asthma      Generalized OA      Afib      H/O hernia repair  2011 and revised in 2013      History of cholecystectomy      Status post debridement      H/O tracheostomy      S/P gastrostomy      S/P jejunostomy          MEDICATIONS  (STANDING):  albuterol/ipratropium for Nebulization 3 milliLiter(s) Nebulizer every 6 hours  chlorhexidine 2% Cloths 1 Application(s) Topical <User Schedule>  lipid, fat emulsion (Fish Oil and Plant Based) 20% Infusion 0.5045 Gm/kG/Day (20.8 mL/Hr) IV Continuous <Continuous>  nystatin Powder 1 Application(s) Topical two times a day  pantoprazole  Injectable 40 milliGRAM(s) IV Push daily  Parenteral Nutrition - Adult 1 Each (58 mL/Hr) TPN Continuous <Continuous>  Parenteral Nutrition - Adult 1 Each (58 mL/Hr) TPN Continuous <Continuous>  rivaroxaban 20 milliGRAM(s) Oral with dinner  sodium chloride 3%  Inhalation 4 milliLiter(s) Inhalation every 6 hours    MEDICATIONS  (PRN):  acetaminophen   Oral Liquid .. 650 milliGRAM(s) Oral every 6 hours PRN Mild Pain (1 - 3)  sodium chloride 0.9% lock flush 10 milliLiter(s) IV Push every 1 hour PRN Pre/post blood products, medications, blood draw, and to maintain line patency      Allergies  No Known Allergies      Review of Systems:   unable to obtain    Physical Examination:  T(C): 35.8 (05-29-25 @ 12:00), Max: 36.6 (05-28-25 @ 16:00)  HR: 87 (05-29-25 @ 12:00) (83 - 96)  BP: 125/61 (05-29-25 @ 12:00) (101/52 - 128/69)  RR: 9 (05-29-25 @ 12:00) (7 - 31)  SpO2: 96% (05-29-25 @ 12:00) (92% - 100%)      05-28-25 @ 07:01 - 05-29-25 @ 07:00  --------------------------------------------------------  IN: 1562.8 mL / OUT: 3867.5 mL / NET: -2304.7 mL    05-29-25 @ 07:01  -  05-29-25 @ 13:49  --------------------------------------------------------  IN: 408 mL / OUT: 95 mL / NET: 313 mL        GENERAL: no acute distress.  HEAD:  Atraumatic, Normocephalic  EYES: conjunctiva and sclera clear  NECK: Supple, no JVD or thyromegaly  CHEST/LUNG: Clear to auscultation bilaterally; No wheeze, rhonchi, or rales  HEART: Regular rate and rhythm; normal S1, S2, No murmurs.  ABDOMEN: Soft, nontender, nondistended; PEGJ in place, post surgical changes noted      Data:                        7.5    10.71 )-----------( 193      ( 29 May 2025 05:17 )             24.0     Hgb trend:  7.5  05-29-25 @ 05:17  7.3  05-27-25 @ 22:35  7.4  05-27-25 @ 05:20      05-26-25 @ 07:01  -  05-27-25 @ 07:00  --------------------------------------------------------  IN: 50 mL      05-29    144  |  110  |  50[H]  ----------------------------<  114[H]  4.1   |  23  |  0.7    Ca    9.4      29 May 2025 05:17  Phos  2.9     05-29  Mg     2.0     05-29      Liver panel trend:  TBili 0.8   /   AST 17   /   ALT 8   /   AlkP 112   /   Tptn 4.5   /   Alb 2.7    /   DBili 0.3      05-27  TBili 0.7   /   AST 15   /   ALT 7   /   AlkP 95   /   Tptn 4.4   /   Alb 2.7    /   DBili --      05-26  TBili 0.4   /   AST 10   /   ALT 9   /   AlkP 91   /   Tptn 4.1   /   Alb 2.1    /   DBili 0.2      05-21      PT/INR - ( 27 May 2025 22:35 )   PT: 11.90 sec;   INR: 1.01 ratio         PTT - ( 27 May 2025 22:35 )  PTT:33.7 sec       Radiology:

## 2025-05-29 NOTE — PROGRESS NOTE ADULT - ATTENDING COMMENTS
SICU Attending  This patient has a high probability of sudden, clinically significant deterioration, which requires the highest level of physician preparedness to intervene urgently. I managed/supervised life or organ supporting interventions that required frequent physician assessment. I devoted my full attention in the ICU to the direct care of this patient for the period of time indicated below. Time I spent with the family or surrogate(s) is included only if the patient was incapable of providing the necessary information or participating in medical decision making. Time devoted to teaching and to any procedures I billed separately is not included.     PATRICIA SPRAGUE 74y Female admitted to perforated peptic ulcer now s/p ex lap, distal antrectomy, abthera placement requiring vasopressors. RTOR 5/16 s/p second look laparotomy, Billroth II reconstruction, gastrojejunostomy tube replaced with a jejunal feeding tube and abdominal wall closure.  duodenal stump leak, treating with antibiotics.   Patient is examined and evaluated at the bedside with SICU team. Treatment plan discussed with SICU team, nurses and primary team.   Chest X-ray and all relevant studies reviewed during rounds.  Will continue hemodynamic monitoring as per protocol in SICU.    Neuro: PRN pain meds, delirium precautions  CV: continue to monitor  Respiratory: chronic trach, bilateral pleural effusions, chest PT, PRN nebs, diurese, daily CXR  GI: S/p GJ tube placement today, start trickle feeds, advance as tolerated, TPN to continue until goal TF are reached   Renal: history of solitary kidney, NOLA, now improving, will hold diuresing for today  ID: s/p IV antibiotics,  Heme: acute blood loss anemia, goal Hb > 7  Endocrine: ISS  PV: follow pulse exam  Skin: decub precautions  DVT Prophylaxis:  SCDs  chemical DVT ppx  Stress Gastritis Prophylaxis: PPI  Mobility: PT as able     I have personally and independently provided [ 55 ] minutes of clinical services. This excludes any time spent on separate procedures or teaching. I have reviewed and amended the note as needed. Treatment plan discussed with SICU team, nurses and primary team at the time of the multidisciplinary rounds. The above note is NOT written at the time of rounds and will reflect all changes throughout management of the patient for the day note is written for.    Madai Peters MD  Trauma/ACS/SICU Attending

## 2025-05-30 LAB
ANION GAP SERPL CALC-SCNC: 8 MMOL/L — SIGNIFICANT CHANGE UP (ref 7–14)
BASOPHILS # BLD AUTO: 0.02 K/UL — SIGNIFICANT CHANGE UP (ref 0–0.2)
BASOPHILS NFR BLD AUTO: 0.2 % — SIGNIFICANT CHANGE UP (ref 0–1)
BUN SERPL-MCNC: 49 MG/DL — HIGH (ref 10–20)
CALCIUM SERPL-MCNC: 9.3 MG/DL — SIGNIFICANT CHANGE UP (ref 8.4–10.5)
CHLORIDE SERPL-SCNC: 109 MMOL/L — SIGNIFICANT CHANGE UP (ref 98–110)
CO2 SERPL-SCNC: 24 MMOL/L — SIGNIFICANT CHANGE UP (ref 17–32)
CREAT SERPL-MCNC: 0.6 MG/DL — LOW (ref 0.7–1.5)
EGFR: 95 ML/MIN/1.73M2 — SIGNIFICANT CHANGE UP
EGFR: 95 ML/MIN/1.73M2 — SIGNIFICANT CHANGE UP
EOSINOPHIL # BLD AUTO: 0.41 K/UL — SIGNIFICANT CHANGE UP (ref 0–0.7)
EOSINOPHIL NFR BLD AUTO: 4.9 % — SIGNIFICANT CHANGE UP (ref 0–8)
GLUCOSE SERPL-MCNC: 122 MG/DL — HIGH (ref 70–99)
HCT VFR BLD CALC: 21.5 % — LOW (ref 37–47)
HCT VFR BLD CALC: 23 % — LOW (ref 37–47)
HGB BLD-MCNC: 6.9 G/DL — CRITICAL LOW (ref 12–16)
HGB BLD-MCNC: 7.2 G/DL — LOW (ref 12–16)
IMM GRANULOCYTES NFR BLD AUTO: 1.1 % — HIGH (ref 0.1–0.3)
LYMPHOCYTES # BLD AUTO: 1.24 K/UL — SIGNIFICANT CHANGE UP (ref 1.2–3.4)
LYMPHOCYTES # BLD AUTO: 14.9 % — LOW (ref 20.5–51.1)
MAGNESIUM SERPL-MCNC: 2 MG/DL — SIGNIFICANT CHANGE UP (ref 1.8–2.4)
MCHC RBC-ENTMCNC: 27.2 PG — SIGNIFICANT CHANGE UP (ref 27–31)
MCHC RBC-ENTMCNC: 27.9 PG — SIGNIFICANT CHANGE UP (ref 27–31)
MCHC RBC-ENTMCNC: 31.3 G/DL — LOW (ref 32–37)
MCHC RBC-ENTMCNC: 32.1 G/DL — SIGNIFICANT CHANGE UP (ref 32–37)
MCV RBC AUTO: 86.8 FL — SIGNIFICANT CHANGE UP (ref 81–99)
MCV RBC AUTO: 87 FL — SIGNIFICANT CHANGE UP (ref 81–99)
MONOCYTES # BLD AUTO: 0.59 K/UL — SIGNIFICANT CHANGE UP (ref 0.1–0.6)
MONOCYTES NFR BLD AUTO: 7.1 % — SIGNIFICANT CHANGE UP (ref 1.7–9.3)
NEUTROPHILS # BLD AUTO: 5.95 K/UL — SIGNIFICANT CHANGE UP (ref 1.4–6.5)
NEUTROPHILS NFR BLD AUTO: 71.8 % — SIGNIFICANT CHANGE UP (ref 42.2–75.2)
NRBC BLD AUTO-RTO: 0 /100 WBCS — SIGNIFICANT CHANGE UP (ref 0–0)
NRBC BLD AUTO-RTO: 0 /100 WBCS — SIGNIFICANT CHANGE UP (ref 0–0)
PHOSPHATE SERPL-MCNC: 3 MG/DL — SIGNIFICANT CHANGE UP (ref 2.1–4.9)
PLATELET # BLD AUTO: 162 K/UL — SIGNIFICANT CHANGE UP (ref 130–400)
PLATELET # BLD AUTO: 168 K/UL — SIGNIFICANT CHANGE UP (ref 130–400)
PMV BLD: 11.6 FL — HIGH (ref 7.4–10.4)
PMV BLD: 12.2 FL — HIGH (ref 7.4–10.4)
POTASSIUM SERPL-MCNC: 3.4 MMOL/L — LOW (ref 3.5–5)
POTASSIUM SERPL-SCNC: 3.4 MMOL/L — LOW (ref 3.5–5)
RBC # BLD: 2.47 M/UL — LOW (ref 4.2–5.4)
RBC # BLD: 2.65 M/UL — LOW (ref 4.2–5.4)
RBC # FLD: 16.3 % — HIGH (ref 11.5–14.5)
RBC # FLD: 16.5 % — HIGH (ref 11.5–14.5)
SODIUM SERPL-SCNC: 141 MMOL/L — SIGNIFICANT CHANGE UP (ref 135–146)
WBC # BLD: 7.91 K/UL — SIGNIFICANT CHANGE UP (ref 4.8–10.8)
WBC # BLD: 8.3 K/UL — SIGNIFICANT CHANGE UP (ref 4.8–10.8)
WBC # FLD AUTO: 7.91 K/UL — SIGNIFICANT CHANGE UP (ref 4.8–10.8)
WBC # FLD AUTO: 8.3 K/UL — SIGNIFICANT CHANGE UP (ref 4.8–10.8)

## 2025-05-30 RX ADMIN — NYSTATIN 1 APPLICATION(S): 100000 CREAM TOPICAL at 17:33

## 2025-05-30 RX ADMIN — IPRATROPIUM BROMIDE AND ALBUTEROL SULFATE 3 MILLILITER(S): .5; 2.5 SOLUTION RESPIRATORY (INHALATION) at 07:58

## 2025-05-30 RX ADMIN — Medication 4 MILLILITER(S): at 20:23

## 2025-05-30 RX ADMIN — RIVAROXABAN 20 MILLIGRAM(S): 10 TABLET, FILM COATED ORAL at 17:29

## 2025-05-30 RX ADMIN — IPRATROPIUM BROMIDE AND ALBUTEROL SULFATE 3 MILLILITER(S): .5; 2.5 SOLUTION RESPIRATORY (INHALATION) at 03:16

## 2025-05-30 RX ADMIN — NYSTATIN 1 APPLICATION(S): 100000 CREAM TOPICAL at 05:36

## 2025-05-30 RX ADMIN — Medication 4 MILLILITER(S): at 03:16

## 2025-05-30 RX ADMIN — IPRATROPIUM BROMIDE AND ALBUTEROL SULFATE 3 MILLILITER(S): .5; 2.5 SOLUTION RESPIRATORY (INHALATION) at 13:06

## 2025-05-30 RX ADMIN — Medication 40 MILLIGRAM(S): at 17:29

## 2025-05-30 RX ADMIN — Medication 1 APPLICATION(S): at 05:36

## 2025-05-30 RX ADMIN — IPRATROPIUM BROMIDE AND ALBUTEROL SULFATE 3 MILLILITER(S): .5; 2.5 SOLUTION RESPIRATORY (INHALATION) at 20:24

## 2025-05-30 NOTE — SWALLOW BEDSIDE ASSESSMENT ADULT - SLP PERTINENT HISTORY OF CURRENT PROBLEM
74F found to have perforated peptic ulcer now s/p ex lap, distal antrectomy, abthera placement requiring vasopressors. RTOR 5/16 s/p second look laparotomy, Billroth II reconstruction, gastrojejunostomy tube replaced with a jejunal feeding tube, abdomen closed at the end of the case. Patient now s/p GJ with GI on 5/28.

## 2025-05-30 NOTE — PROGRESS NOTE ADULT - ASSESSMENT
74y old Female with above PMHx, s/p exlap and second look laparotomy with billroth II and placement of J tube, 5/28 replacement of J to GJ by GI team.    Plan:  - Tolerating tube feeds via J, f/u goal rate for outpt, f/u SLP re-eval today for possible PO resumption, wean off TPN  - PPI BID per GI recs  - keep G to gravity  - monitor respiratory status, on RA 5/30  - monitor vik drain output, serous  - Monitor uop, diuresis prn  - CM: [facility], Joseluis sent, anticipated for d/c    SURGERY SPECTRA: [id number]

## 2025-05-30 NOTE — PROGRESS NOTE ADULT - SUBJECTIVE AND OBJECTIVE BOX
GENERAL SURGERY PROGRESS NOTE     PATRICIA SPRAGUE  74[facility] day :18d    POD: 14d  Procedure: Partial antrectomy    US Doppler guided insertion of central venous catheter    Laparotomy, second look, with packing removal    Billroth II operation    Replacement of gastrojejunostomy tube      Surgical Attending: Yumi Pena  24 hour events: On TPN, CTAP yesterday reviewed, RUL pna? , no leukocytosis, VIK serous, G to gravity, ya intact.    PHYSICAL EXAM:  GENERAL: NAD, well-appearing  CHEST/LUNG: Equal chest rise bilaterally, on RA  HEART: Regular rate and rhythm  ABDOMEN: Soft, Nontender, Nondistended; dressings intact, vik in place (serous). GJ intact, G to gravity (bilious)  EXTREMITIES:  No clubbing, cyanosis, or edema        T(F): 96.9 (05-30-25 @ 04:21), Max: 98 (05-29-25 @ 21:37)  HR: 68 (05-30-25 @ 04:21) (68 - 98)  BP: 117/74 (05-30-25 @ 04:21) (100/67 - 130/64)  ABP: --  ABP(mean): --  RR: 18 (05-30-25 @ 04:21) (9 - 33)  SpO2: 97% (05-30-25 @ 04:21) (73% - 100%)    IN'S / OUT's:    05-29-25 @ 07:01  -  05-30-25 @ 07:00  --------------------------------------------------------  IN:    Enteral Tube Flush: 730 mL    Free Water: 80 mL    Glucerna: 180 mL    TPN (Total Parenteral Nutrition): 696 mL  Total IN: 1686 mL    OUT:    Bulb (mL): 60 mL    Indwelling Catheter - Urethral (mL): 2585 mL    PEGJ (Percutaneous Endoscopic Gastrojejunostomy) Tube (mL): 70 mL  Total OUT: 2715 mL    Total NET: -1029 mL          MEDICATIONS  (STANDING):  albuterol/ipratropium for Nebulization 3 milliLiter(s) Nebulizer every 6 hours  chlorhexidine 2% Cloths 1 Application(s) Topical <User Schedule>  nystatin Powder 1 Application(s) Topical two times a day  pantoprazole  Injectable 40 milliGRAM(s) IV Push two times a day  Parenteral Nutrition - Adult 1 Each (58 mL/Hr) TPN Continuous <Continuous>  rivaroxaban 20 milliGRAM(s) Oral with dinner  sodium chloride 3%  Inhalation 4 milliLiter(s) Inhalation every 6 hours    MEDICATIONS  (PRN):  acetaminophen   Oral Liquid .. 650 milliGRAM(s) Oral every 6 hours PRN Mild Pain (1 - 3)  sodium chloride 0.9% lock flush 10 milliLiter(s) IV Push every 1 hour PRN Pre/post blood products, medications, blood draw, and to maintain line patency      LABS  Labs:  CAPILLARY BLOOD GLUCOSE                              6.9    7.91  )-----------( 162      ( 30 May 2025 05:29 )             21.5         05-30    141  |  109  |  49[H]  ----------------------------<  122[H]  3.4[L]   |  24  |  0.6[L]      Calcium: 9.3 mg/dL (05-30-25 @ 05:29)      LFTs:         Coags:            Urinalysis Basic - ( 30 May 2025 05:29 )    Color: x / Appearance: x / SG: x / pH: x  Gluc: 122 mg/dL / Ketone: x  / Bili: x / Urobili: x   Blood: x / Protein: x / Nitrite: x   Leuk Esterase: x / RBC: x / WBC x   Sq Epi: x / Non Sq Epi: x / Bacteria: x            RADIOLOGY & ADDITIONAL TESTS:

## 2025-05-30 NOTE — SWALLOW BEDSIDE ASSESSMENT ADULT - COMMENTS
Pt known to acute SLP service, hx of multiple instrumental swallow studies, most recently FEES 11/15/2025, recs for NPO w/ non-oral means of nutrition/hydration. Per SLP @ [facility], pt w/ recent VFSS @ [facility] w/ recs for puree, moderately thick liquids.    recently here in April 2025  "pt received in bed awake alert +dysarthria +Shiley trach size 7.5, +10L humidified O2 via trach collar, +PMSV in place, +voicing; pt reports +toleration of current PO diet.....slp observed toleration of puree and mod thick with PMSV"

## 2025-05-31 ENCOUNTER — TRANSCRIPTION ENCOUNTER (OUTPATIENT)
Age: 75
End: 2025-05-31

## 2025-05-31 LAB
ANION GAP SERPL CALC-SCNC: 10 MMOL/L — SIGNIFICANT CHANGE UP (ref 7–14)
ANION GAP SERPL CALC-SCNC: 8 MMOL/L — SIGNIFICANT CHANGE UP (ref 7–14)
BASOPHILS # BLD AUTO: 0.02 K/UL — SIGNIFICANT CHANGE UP (ref 0–0.2)
BASOPHILS # BLD AUTO: 0.04 K/UL — SIGNIFICANT CHANGE UP (ref 0–0.2)
BASOPHILS NFR BLD AUTO: 0.3 % — SIGNIFICANT CHANGE UP (ref 0–1)
BASOPHILS NFR BLD AUTO: 0.6 % — SIGNIFICANT CHANGE UP (ref 0–1)
BUN SERPL-MCNC: 36 MG/DL — HIGH (ref 10–20)
BUN SERPL-MCNC: 40 MG/DL — HIGH (ref 10–20)
CALCIUM SERPL-MCNC: 9.4 MG/DL — SIGNIFICANT CHANGE UP (ref 8.4–10.5)
CALCIUM SERPL-MCNC: 9.6 MG/DL — SIGNIFICANT CHANGE UP (ref 8.4–10.5)
CHLORIDE SERPL-SCNC: 111 MMOL/L — HIGH (ref 98–110)
CHLORIDE SERPL-SCNC: 115 MMOL/L — HIGH (ref 98–110)
CO2 SERPL-SCNC: 23 MMOL/L — SIGNIFICANT CHANGE UP (ref 17–32)
CO2 SERPL-SCNC: 24 MMOL/L — SIGNIFICANT CHANGE UP (ref 17–32)
CREAT SERPL-MCNC: 0.5 MG/DL — LOW (ref 0.7–1.5)
CREAT SERPL-MCNC: 0.6 MG/DL — LOW (ref 0.7–1.5)
EGFR: 100 ML/MIN/1.73M2 — SIGNIFICANT CHANGE UP
EGFR: 100 ML/MIN/1.73M2 — SIGNIFICANT CHANGE UP
EGFR: 95 ML/MIN/1.73M2 — SIGNIFICANT CHANGE UP
EGFR: 95 ML/MIN/1.73M2 — SIGNIFICANT CHANGE UP
EOSINOPHIL # BLD AUTO: 0.59 K/UL — SIGNIFICANT CHANGE UP (ref 0–0.7)
EOSINOPHIL # BLD AUTO: 0.66 K/UL — SIGNIFICANT CHANGE UP (ref 0–0.7)
EOSINOPHIL NFR BLD AUTO: 10.5 % — HIGH (ref 0–8)
EOSINOPHIL NFR BLD AUTO: 9.5 % — HIGH (ref 0–8)
GLUCOSE SERPL-MCNC: 105 MG/DL — HIGH (ref 70–99)
GLUCOSE SERPL-MCNC: 94 MG/DL — SIGNIFICANT CHANGE UP (ref 70–99)
HCT VFR BLD CALC: 21.9 % — LOW (ref 37–47)
HCT VFR BLD CALC: 22.8 % — LOW (ref 37–47)
HGB BLD-MCNC: 6.9 G/DL — CRITICAL LOW (ref 12–16)
HGB BLD-MCNC: 7.1 G/DL — LOW (ref 12–16)
IMM GRANULOCYTES NFR BLD AUTO: 0.3 % — SIGNIFICANT CHANGE UP (ref 0.1–0.3)
IMM GRANULOCYTES NFR BLD AUTO: 0.8 % — HIGH (ref 0.1–0.3)
LYMPHOCYTES # BLD AUTO: 1.15 K/UL — LOW (ref 1.2–3.4)
LYMPHOCYTES # BLD AUTO: 1.38 K/UL — SIGNIFICANT CHANGE UP (ref 1.2–3.4)
LYMPHOCYTES # BLD AUTO: 18.3 % — LOW (ref 20.5–51.1)
LYMPHOCYTES # BLD AUTO: 22.2 % — SIGNIFICANT CHANGE UP (ref 20.5–51.1)
MAGNESIUM SERPL-MCNC: 1.7 MG/DL — LOW (ref 1.8–2.4)
MAGNESIUM SERPL-MCNC: 1.9 MG/DL — SIGNIFICANT CHANGE UP (ref 1.8–2.4)
MCHC RBC-ENTMCNC: 27.3 PG — SIGNIFICANT CHANGE UP (ref 27–31)
MCHC RBC-ENTMCNC: 27.9 PG — SIGNIFICANT CHANGE UP (ref 27–31)
MCHC RBC-ENTMCNC: 31.1 G/DL — LOW (ref 32–37)
MCHC RBC-ENTMCNC: 31.5 G/DL — LOW (ref 32–37)
MCV RBC AUTO: 87.7 FL — SIGNIFICANT CHANGE UP (ref 81–99)
MCV RBC AUTO: 88.7 FL — SIGNIFICANT CHANGE UP (ref 81–99)
MONOCYTES # BLD AUTO: 0.5 K/UL — SIGNIFICANT CHANGE UP (ref 0.1–0.6)
MONOCYTES # BLD AUTO: 0.51 K/UL — SIGNIFICANT CHANGE UP (ref 0.1–0.6)
MONOCYTES NFR BLD AUTO: 8 % — SIGNIFICANT CHANGE UP (ref 1.7–9.3)
MONOCYTES NFR BLD AUTO: 8.2 % — SIGNIFICANT CHANGE UP (ref 1.7–9.3)
NEUTROPHILS # BLD AUTO: 3.69 K/UL — SIGNIFICANT CHANGE UP (ref 1.4–6.5)
NEUTROPHILS # BLD AUTO: 3.89 K/UL — SIGNIFICANT CHANGE UP (ref 1.4–6.5)
NEUTROPHILS NFR BLD AUTO: 59.2 % — SIGNIFICANT CHANGE UP (ref 42.2–75.2)
NEUTROPHILS NFR BLD AUTO: 62.1 % — SIGNIFICANT CHANGE UP (ref 42.2–75.2)
NRBC BLD AUTO-RTO: 0 /100 WBCS — SIGNIFICANT CHANGE UP (ref 0–0)
NRBC BLD AUTO-RTO: 0 /100 WBCS — SIGNIFICANT CHANGE UP (ref 0–0)
PHOSPHATE SERPL-MCNC: 2.4 MG/DL — SIGNIFICANT CHANGE UP (ref 2.1–4.9)
PHOSPHATE SERPL-MCNC: 4.5 MG/DL — SIGNIFICANT CHANGE UP (ref 2.1–4.9)
PLATELET # BLD AUTO: 192 K/UL — SIGNIFICANT CHANGE UP (ref 130–400)
PLATELET # BLD AUTO: 196 K/UL — SIGNIFICANT CHANGE UP (ref 130–400)
PMV BLD: 12.2 FL — HIGH (ref 7.4–10.4)
PMV BLD: 12.7 FL — HIGH (ref 7.4–10.4)
POTASSIUM SERPL-MCNC: 3.5 MMOL/L — SIGNIFICANT CHANGE UP (ref 3.5–5)
POTASSIUM SERPL-MCNC: 4.9 MMOL/L — SIGNIFICANT CHANGE UP (ref 3.5–5)
POTASSIUM SERPL-SCNC: 3.5 MMOL/L — SIGNIFICANT CHANGE UP (ref 3.5–5)
POTASSIUM SERPL-SCNC: 4.9 MMOL/L — SIGNIFICANT CHANGE UP (ref 3.5–5)
RBC # BLD: 2.47 M/UL — LOW (ref 4.2–5.4)
RBC # BLD: 2.6 M/UL — LOW (ref 4.2–5.4)
RBC # FLD: 16.3 % — HIGH (ref 11.5–14.5)
RBC # FLD: 16.5 % — HIGH (ref 11.5–14.5)
SODIUM SERPL-SCNC: 143 MMOL/L — SIGNIFICANT CHANGE UP (ref 135–146)
SODIUM SERPL-SCNC: 148 MMOL/L — HIGH (ref 135–146)
WBC # BLD: 6.23 K/UL — SIGNIFICANT CHANGE UP (ref 4.8–10.8)
WBC # BLD: 6.27 K/UL — SIGNIFICANT CHANGE UP (ref 4.8–10.8)
WBC # FLD AUTO: 6.23 K/UL — SIGNIFICANT CHANGE UP (ref 4.8–10.8)
WBC # FLD AUTO: 6.27 K/UL — SIGNIFICANT CHANGE UP (ref 4.8–10.8)

## 2025-05-31 PROCEDURE — 99232 SBSQ HOSP IP/OBS MODERATE 35: CPT | Mod: 24

## 2025-05-31 RX ORDER — ACETAMINOPHEN 500 MG/5ML
20.31 LIQUID (ML) ORAL
Qty: 0 | Refills: 0 | DISCHARGE
Start: 2025-05-31

## 2025-05-31 RX ORDER — POTASSIUM PHOSPHATE, MONOBASIC POTASSIUM PHOSPHATE, DIBASIC INJECTION, 236; 224 MG/ML; MG/ML
30 SOLUTION, CONCENTRATE INTRAVENOUS ONCE
Refills: 0 | Status: COMPLETED | OUTPATIENT
Start: 2025-05-31 | End: 2025-05-31

## 2025-05-31 RX ORDER — MAGNESIUM SULFATE 500 MG/ML
2 SYRINGE (ML) INJECTION ONCE
Refills: 0 | Status: COMPLETED | OUTPATIENT
Start: 2025-05-31 | End: 2025-06-01

## 2025-05-31 RX ADMIN — Medication 4 MILLILITER(S): at 01:44

## 2025-05-31 RX ADMIN — Medication 4 MILLILITER(S): at 14:47

## 2025-05-31 RX ADMIN — Medication 1 APPLICATION(S): at 05:47

## 2025-05-31 RX ADMIN — IPRATROPIUM BROMIDE AND ALBUTEROL SULFATE 3 MILLILITER(S): .5; 2.5 SOLUTION RESPIRATORY (INHALATION) at 19:52

## 2025-05-31 RX ADMIN — IPRATROPIUM BROMIDE AND ALBUTEROL SULFATE 3 MILLILITER(S): .5; 2.5 SOLUTION RESPIRATORY (INHALATION) at 14:47

## 2025-05-31 RX ADMIN — NYSTATIN 1 APPLICATION(S): 100000 CREAM TOPICAL at 17:31

## 2025-05-31 RX ADMIN — Medication 40 MILLIGRAM(S): at 05:47

## 2025-05-31 RX ADMIN — IPRATROPIUM BROMIDE AND ALBUTEROL SULFATE 3 MILLILITER(S): .5; 2.5 SOLUTION RESPIRATORY (INHALATION) at 08:19

## 2025-05-31 RX ADMIN — NYSTATIN 1 APPLICATION(S): 100000 CREAM TOPICAL at 05:46

## 2025-05-31 RX ADMIN — Medication 40 MILLIGRAM(S): at 17:16

## 2025-05-31 RX ADMIN — RIVAROXABAN 20 MILLIGRAM(S): 10 TABLET, FILM COATED ORAL at 17:17

## 2025-05-31 RX ADMIN — IPRATROPIUM BROMIDE AND ALBUTEROL SULFATE 3 MILLILITER(S): .5; 2.5 SOLUTION RESPIRATORY (INHALATION) at 01:44

## 2025-05-31 RX ADMIN — Medication 4 MILLILITER(S): at 09:33

## 2025-05-31 RX ADMIN — Medication 4 MILLILITER(S): at 19:52

## 2025-05-31 RX ADMIN — POTASSIUM PHOSPHATE, MONOBASIC POTASSIUM PHOSPHATE, DIBASIC INJECTION, 83.33 MILLIMOLE(S): 236; 224 SOLUTION, CONCENTRATE INTRAVENOUS at 12:23

## 2025-05-31 NOTE — DISCHARGE NOTE PROVIDER - INSTRUCTIONS
Through J port: Pivot 1.5 @35/hr X 24hrs or 47/hr X 18 hrs would be goal. Eventually getting to 70/hr X 12 hr.

## 2025-05-31 NOTE — DISCHARGE NOTE NURSING/CASE MANAGEMENT/SOCIAL WORK - NSDCVIVACCINE_GEN_ALL_CORE_FT
influenza, injectable, quadrivalent, preservative free; 02-Oct-2019 12:29; Ellie Gonzalez (RN); Metooo; 3bs44 (Exp. Date: 20-Jun-2020); IntraMuscular; Deltoid Left.; 0.5 milliLiter(s); VIS (VIS Published: 15-Aug-2019, VIS Presented: 02-Oct-2019);

## 2025-05-31 NOTE — DISCHARGE NOTE PROVIDER - NSDCCPCAREPLAN_GEN_ALL_CORE_FT
PRINCIPAL DISCHARGE DIAGNOSIS  Diagnosis: Perforated duodenal ulcer  Assessment and Plan of Treatment: You were admitted for perforated duodenal ulcer workup. You had to undergo surgery on 5/14 and on 5/17. Your hospital course mainly consisted of being cared by the surgical intensive care unit post-operatively.  You were unable to be resumed on oral diet due to failed swallow evaluations by the SLP team. Please follow up with an SLP team in the future for future trials.  You have a PICC line for TPN infusions if the tube feeds through the jejunostomy port of the GJ tube are not tolerated. PICC line can be removed if you are tolerating tube feeds at goal per nutritionist recommendations.  Please continue 1/2" plain packing changes to the midline incision wound daily. The wound will close eventually and does not require further intervention.  Continue your home medications, including xarelto.      SECONDARY DISCHARGE DIAGNOSES  Diagnosis: NOLA (acute kidney injury)  Assessment and Plan of Treatment:

## 2025-05-31 NOTE — DISCHARGE NOTE NURSING/CASE MANAGEMENT/SOCIAL WORK - FINANCIAL ASSISTANCE
[facility] provides services at a reduced cost to those who are determined to be eligible through [facility]’s financial assistance program. Information regarding [facility]’s financial assistance program can be found by going to [email or web address] or by calling [phone].

## 2025-05-31 NOTE — PROGRESS NOTE ADULT - ASSESSMENT
74y old Female with above PMHx, s/p exlap and second look laparotomy with billroth II and placement of J tube, 5/28 replacement of J to GJ by GI team.    Plan:  - Tolerating tube feeds via J, continue until goal rate of 35   - PPI BID per GI recs  - monitor respiratory status  - monitor vik drain output, serous  - Monitor uop, diuresis prn  - CM: [facility] LTC, Joseluis sent    SURGERY SPECTRA: [id number]

## 2025-05-31 NOTE — DISCHARGE NOTE PROVIDER - NSDCFUADDINST_GEN_ALL_CORE_FT
Please do 1/2" plain packing, 4x4 gauze, paper tape over the midline surgical incision site daily.    PICC line can be removed if tolerating J tube feeds at goal, as there will no be indication for TPN. Currently off TPN at the time of discharge to facility.    PPI BID for 8 weeks total (end date: July 23rd, 2025)    No indication for antibiotics per ID team.    Burger can be replaced as needed, but should stay permanently.

## 2025-05-31 NOTE — DISCHARGE NOTE NURSING/CASE MANAGEMENT/SOCIAL WORK - NSDCPEFALRISK_GEN_ALL_CORE
For information on Fall & Injury Prevention, visit: [email or web address] OR  [email or web address] OR  https://www.cdc.gov/steadi/patient.html

## 2025-05-31 NOTE — DISCHARGE NOTE PROVIDER - NSDCFUSCHEDAPPT_GEN_ALL_CORE_FT
Hillary Montalvo  [facility] PreAdmits  Scheduled Appointment: 06/04/2025    [facility]  [facility]  [address]  Scheduled Appointment: 06/04/2025

## 2025-05-31 NOTE — PROGRESS NOTE ADULT - ATTENDING SUPERVISION STATEMENT
Fellow
Resident
Resident
Fellow
Fellow
Resident
Fellow
Resident

## 2025-05-31 NOTE — DISCHARGE NOTE NURSING/CASE MANAGEMENT/SOCIAL WORK - PATIENT PORTAL LINK FT
You can access the FollowMyHealth Patient Portal offered by [facility] by registering at the following website: [email or web address]. By joining [facility]’s FollowMyHealth portal, you will also be able to view your health information using other applications (apps) compatible with our system.

## 2025-05-31 NOTE — PROGRESS NOTE ADULT - ATTENDING COMMENTS
Trauma/Acute Care Surgery Attending Note Attestation  Patient was seen and examined bedside.  I reviewed the resident/PA note and agreed with above assessment and plan with following additions and corrections.    Awake and alert. Attempting to communicate with her hands.    T(C): 36.7 (05-31-25 @ 08:13), Max: 36.7 (05-30-25 @ 16:15)  HR: 62 (05-31-25 @ 08:13) (62 - 80)  BP: 102/62 (05-31-25 @ 08:13) (93/54 - 112/69)  RR: 18 (05-31-25 @ 08:13) (18 - 18)  SpO2: 100% (05-31-25 @ 08:13) (99% - 100%)      05-30-25 @ 07:01  -  05-31-25 @ 07:00  --------------------------------------------------------  IN:    Glucerna: 620 mL    TPN (Total Parenteral Nutrition): 725 mL  Total IN: 1345 mL    OUT:    Bulb (mL): 40 mL    Indwelling Catheter - Urethral (mL): 1100 mL  Total OUT: 1140 mL    Total NET: 205 mL    I independently performed a medically appropriate exam. The exam was notable for midline wound with small epidermal separation. G-J tube in place. BULMARO drain serous.                          7.1    6.27  )-----------( 192      ( 31 May 2025 07:06 )             22.8     05-31    143  |  111[H]  |  40[H]  ----------------------------<  105[H]  3.5   |  24  |  0.5[L]    Ca 9.6; Mg 1.9; Phos 2.4      Assessment/Plan:  #Perforated peptic ulcer - s/p antrectomy and take back for Bilroth 2 reconstruction, J tube feeds as tolerated, protonix 40mg BID  #Chronic respiratory failure - tracheostomy in place, duonebs, inhaled hypertonic saline  #Atrial fibrillation - xarelto 20mg oral daily for anticoagulation  #Electrolyte depletion - potassium phosphate 30mmol IVPB to prevent hypokalemia and hypophosphatemia  #Physical deconditioning - ongoing PT eval    Teofilo Tyson MD  Trauma/Acute Care Surgery/Surgical Critical Care Attending

## 2025-05-31 NOTE — DISCHARGE NOTE PROVIDER - CARE PROVIDER_API CALL
Yumi Pena  Surgical Critical Care  [address] [place] [address], [address]  [place] [address]  Phone: [phone]  Follow Up Time: 2 weeks

## 2025-05-31 NOTE — DISCHARGE NOTE PROVIDER - HOSPITAL COURSE
74F w/ pmh of afib (on xarelto), HTN, DM, HLD, Congenital solitary kidney, s/p ccy, s/p abdominal wall debridement x3, s/p trach and peg/j in 2024, found to have perforated peptic ulcer. Underwent s/p ex lap, distal antrectomy, abthera placement requiring vasopressors. RTOR 5/16 s/p second look laparotomy, Billroth II reconstruction, gastrojejunostomy tube replaced with a jejunal feeding tube, abdomen closed at the end of the case. Patient now s/p GJ with GI on 5/28.     SICU COURSE:  05-13 - admitted to SICU service  05-14 - s/p OR for exploratory laparotomy, started remdesivir for covid +, echo ordered  5/15 - Echo showing EF 30-35%, cardiology consulted. Pending RTOR. Remains on vasopressors, weaning.  5/16: S/p Exploratory laparotomy, bilroth 2, J tube placement. Episode of wheezing. Changed to meropenem per ID. AFIB slow ventricular response HR 40s-60s, remains on low dose pressors   5/17: Tolerating T piece, trickle feed started via J tube, s/p 1u PRBC to wean off levophed, remains on vasopressin, IVL  5/18: Midodrine 10mg q8 started, weaned off levophed. BULMARO drain bilious output > strict NPO, fluid composition (low bilirubin contest, low concern for leak).   5/19: CTAP - No evidence of drainable fluid collection.   5/20: Trickle feeds restarted but discontinued 2/2 high gastric residual, NGT kep to suction. Caspofungin discontinued, narrowed to diclucan.  5/21: GI consulted for GJ replacement as J was noted to be coiled in stomach.  5/23: PICC line placed with IR, started TPN in PM, IVL  5/24: diuresis with lasix. albumin 55 250cc x 1 given overnight.  5/25- 25% 50cc albumin x 1 given for SBP 90s, improved to 120s. Diuresis with 20mg lasix x 1, >1.8L response. IPV increased to Q6 hours.  5/26: Diuresis with 20mg lasix, given 25% 50cc, >1L response.    5/28: S/p GJ tube placement. D/c diflucan. 20 lasix with good response.  5/29: CT A/P with PO/IV contrast done and reviewed, improved inflammatory changes and no drainable collections noted. DG to 4C.     On the 4C floor, patient had tube feeds via J advanced towards goal rate, while g to gravity and was weaned off TPN on 5/30. Failed SLP eval for PO trial, was unable to tolerate speaking valve. Had staples removed 5/30 from midline abdomen surgical wound. Middle of the incision site was noted to be opened, 1/2" plain packing daily dressing changes initiated, no intervention indicated.    INSTRUCTIONS:   Please do 1/2" plain packing, 4x4 gauze, paper tape over the midline surgical incision site daily.    PICC line can be removed if tolerating J tube feeds at goal, as there will no be indication for TPN. Currently off TPN at the time of discharge to facility.  GOAL: Through J port: Pivot 1.5 @35/hr X 24hrs or 47/hr X 18 hrs would be goal. Eventually getting to 70/hr X 12 hr.    PPI BID for 8 weeks total (end date: July 23rd, 2025)    No indication for antibiotics per ID team.    Burger can be replaced as needed, but should stay permanently.   74F w/ pmh of afib (on xarelto), HTN, DM, HLD, Congenital solitary kidney, s/p ccy, s/p abdominal wall debridement x3, s/p trach and peg/j in 2024, found to have perforated peptic ulcer. Underwent s/p ex lap, distal antrectomy, abthera placement requiring vasopressors. RTOR 5/16 s/p second look laparotomy, Billroth II reconstruction, gastrojejunostomy tube replaced with a jejunal feeding tube, abdomen closed at the end of the case. Patient now s/p GJ with GI on 5/28.     SICU COURSE:  05-13 - admitted to SICU service  05-14 - s/p OR for exploratory laparotomy, started remdesivir for covid +, echo ordered  5/15 - Echo showing EF 30-35%, cardiology consulted. Pending RTOR. Remains on vasopressors, weaning.  5/16: S/p Exploratory laparotomy, bilroth 2, J tube placement. Episode of wheezing. Changed to meropenem per ID. AFIB slow ventricular response HR 40s-60s, remains on low dose pressors   5/17: Tolerating T piece, trickle feed started via J tube, s/p 1u PRBC to wean off levophed, remains on vasopressin, IVL  5/18: Midodrine 10mg q8 started, weaned off levophed. BULMARO drain bilious output > strict NPO, fluid composition (low bilirubin contest, low concern for leak).   5/19: CTAP - No evidence of drainable fluid collection.   5/20: Trickle feeds restarted but discontinued 2/2 high gastric residual, NGT kep to suction. Caspofungin discontinued, narrowed to diclucan.  5/21: GI consulted for GJ replacement as J was noted to be coiled in stomach.  5/23: PICC line placed with IR, started TPN in PM, IVL  5/24: diuresis with lasix. albumin 55 250cc x 1 given overnight.  5/25- 25% 50cc albumin x 1 given for SBP 90s, improved to 120s. Diuresis with 20mg lasix x 1, >1.8L response. IPV increased to Q6 hours.  5/26: Diuresis with 20mg lasix, given 25% 50cc, >1L response.    5/28: S/p GJ tube placement. D/c diflucan. 20 lasix with good response.  5/29: CT A/P with PO/IV contrast done and reviewed, improved inflammatory changes and no drainable collections noted. DG to 4C.     On the 4C floor, patient had tube feeds via J advanced towards goal rate, while g to gravity and was weaned off TPN on 5/30. Failed SLP eval for PO trial, was unable to tolerate speaking valve. Had staples removed 5/30 from midline abdomen surgical wound. Middle of the incision site was noted to be opened, 1/2" plain packing daily dressing changes initiated, no intervention indicated. Started free water flushes for hypernatremia, resolved, 146 from 149.     INSTRUCTIONS:   Please do 1/2" plain packing, 4x4 gauze, paper tape over the midline surgical incision site daily.    PICC line can be removed if tolerating J tube feeds at goal, as there will no be indication for TPN. Currently off TPN at the time of discharge to facility.  GOAL: Through J port: Pivot 1.5 @35/hr X 24hrs or 47/hr X 18 hrs would be goal. Eventually getting to 70/hr X 12 hr.    PPI BID for 8 weeks total (end date: July 23rd, 2025)    No indication for antibiotics per ID team.    Burger can be replaced as needed, but should stay permanently.

## 2025-05-31 NOTE — DISCHARGE NOTE PROVIDER - NSDCMRMEDTOKEN_GEN_ALL_CORE_FT
acetaminophen 160 mg/5 mL oral suspension: 20.31 milliliter(s) orally every 6 hours as needed for Mild Pain (1 - 3)  albuterol 2.5 mg/3 mL (0.083%) inhalation solution: 3 milliliter(s) by nebulizer every 6 hours as needed for  shortness of breath and/or wheezing  alendronate 70 mg oral tablet: 1 tab(s) orally once a week 5 AM on Thursday  cholecalciferol 400 intl units (10 mcg) oral tablet: 1 tab(s) orally once a day  ipratropium: 1 unit(s) by nebulizer every 6 hours (ipratropium bromide inhalation 0.02% solution)  losartan 100 mg oral tablet: 1 tab(s) orally once a day  Multiple Vitamins with Minerals oral liquid: 10 milliliter(s) by jejunostomy tube once a day  pantoprazole 40 mg intravenous injection: 40 milligram(s) intravenous 2 times a day  rivaroxaban 15 mg oral tablet: 1 tab(s) orally once a day (at bedtime)  Sensipar 30 mg oral tablet: 1 tab(s) orally every 12 hours

## 2025-05-31 NOTE — PROGRESS NOTE ADULT - SUBJECTIVE AND OBJECTIVE BOX
GENERAL SURGERY PROGRESS NOTE     Patient: PATRICIA SPRAGUE , 74y ([birth year])Female   MRN: [id number]  Location: [address]  Visit: 05-13-25 Inpatient  Date: 05-31-25 @ 02:43        Admitted :05-13-25 (18d)  LOS: 18d    Procedure/Dx/Injuries: Perforated peptic ulcer    Perforated duodenal ulcer    Perforated peptic ulcer    Septic shock    Acute respiratory failure with hypoxia    Encounter for palliative care    Partial antrectomy    US Doppler guided insertion of central venous catheter    Laparotomy, second look, with packing removal    Billroth II operation    Replacement of gastrojejunostomy tube         Events of past 24 hours: Staples removed in the day, inferior portion of midline incision slightly opened and packed. Tube feeds running through the J at continuous rate at 20, with goal of 35  >>> <<<>>> <<<>>> <<<>>> <<<>>> <<<>>> <<<>>> <<<>>> <<<>>> <<<>>> <<<    Vitals:   T(F): 97.7 (05-31-25 @ 00:48), Max: 98 (05-30-25 @ 16:15)  HR: 78 (05-31-25 @ 00:48)  BP: 93/54 (05-31-25 @ 00:48)  RR: 18 (05-31-25 @ 00:48)  SpO2: 100% (05-31-25 @ 00:48)      PHYSICAL EXAM:  GENERAL: NAD, well-appearing  CHEST/LUNG: Equal chest rise bilaterally, on RA  HEART: Regular rate and rhythm  ABDOMEN: Soft, Nontender, Nondistended; dressings intact, vik in place (serous). GJ intact  EXTREMITIES:  No clubbing, cyanosis, or edema  >>> <<<>>> <<<>>> <<<>>> <<<>>> <<<>>> <<<>>> <<<>>> <<<>>> <<<>>> <<<   Is & Os:   Diet, NPO:   Tube Feeding Modality: Jejunostomy  Pivot 1.5 Jeff (PIVOT1.5RTH)  Total Volume for 24 Hours (mL): 840  Continuous  Starting Tube Feed Rate mL per Hour: 25  Increase Tube Feed Rate by (mL): 10     Every 4 hours  Until Goal Tube Feed Rate (mL per Hour): 35  Tube Feed Duration (in Hours): 24  Tube Feed Start Time: 11:00    Fluids:     05-29-25 @ 07:01  -  05-30-25 @ 07:00  --------------------------------------------------------  IN:    Enteral Tube Flush: 730 mL    Free Water: 80 mL    Glucerna: 180 mL    TPN (Total Parenteral Nutrition): 696 mL  Total IN: 1686 mL    OUT:    Bulb (mL): 60 mL    Indwelling Catheter - Urethral (mL): 2585 mL    PEGJ (Percutaneous Endoscopic Gastrojejunostomy) Tube (mL): 70 mL  Total OUT: 2715 mL    Total NET: -1029 mL      >>> <<<>>> <<<>>> <<<>>> <<<>>> <<<>>> <<<>>> <<<>>> <<<>>> <<<>>> <<<    MEDICATIONS  (STANDING):  albuterol/ipratropium for Nebulization 3 milliLiter(s) Nebulizer every 6 hours  chlorhexidine 2% Cloths 1 Application(s) Topical <User Schedule>  nystatin Powder 1 Application(s) Topical two times a day  pantoprazole  Injectable 40 milliGRAM(s) IV Push two times a day  rivaroxaban 20 milliGRAM(s) Oral with dinner  sodium chloride 3%  Inhalation 4 milliLiter(s) Inhalation every 6 hours    MEDICATIONS  (PRN):  acetaminophen   Oral Liquid .. 650 milliGRAM(s) Oral every 6 hours PRN Mild Pain (1 - 3)  sodium chloride 0.9% lock flush 10 milliLiter(s) IV Push every 1 hour PRN Pre/post blood products, medications, blood draw, and to maintain line patency      DVT PROPHYLAXIS:   GI PROPHYLAXIS: pantoprazole  Injectable 40 milliGRAM(s) IV Push two times a day    ANTICOAGULATION:   ANTIBIOTICS:      >>> <<<>>> <<<>>> <<<>>> <<<>>> <<<>>> <<<>>> <<<>>> <<<>>> <<<>>> <<<        LAB/STUDIES:  Labs:  CAPILLARY BLOOD GLUCOSE                              7.2    8.30  )-----------( 168      ( 30 May 2025 11:57 )             23.0       Auto Immature Granulocyte %: 1.1 % (05-30-25 @ 11:57)    05-30    141  |  109  |  49[H]  ----------------------------<  122[H]  3.4[L]   |  24  |  0.6[L]      Calcium: 9.3 mg/dL (05-30-25 @ 05:29)      LFTs:         Coags:            Urinalysis Basic - ( 30 May 2025 05:29 )    Color: x / Appearance: x / SG: x / pH: x  Gluc: 122 mg/dL / Ketone: x  / Bili: x / Urobili: x   Blood: x / Protein: x / Nitrite: x   Leuk Esterase: x / RBC: x / WBC x   Sq Epi: x / Non Sq Epi: x / Bacteria: x

## 2025-06-01 LAB
ANION GAP SERPL CALC-SCNC: 9 MMOL/L — SIGNIFICANT CHANGE UP (ref 7–14)
BUN SERPL-MCNC: 33 MG/DL — HIGH (ref 10–20)
CALCIUM SERPL-MCNC: 9.5 MG/DL — SIGNIFICANT CHANGE UP (ref 8.4–10.5)
CHLORIDE SERPL-SCNC: 117 MMOL/L — HIGH (ref 98–110)
CO2 SERPL-SCNC: 23 MMOL/L — SIGNIFICANT CHANGE UP (ref 17–32)
CREAT SERPL-MCNC: 0.6 MG/DL — LOW (ref 0.7–1.5)
EGFR: 95 ML/MIN/1.73M2 — SIGNIFICANT CHANGE UP
EGFR: 95 ML/MIN/1.73M2 — SIGNIFICANT CHANGE UP
GLUCOSE SERPL-MCNC: 113 MG/DL — HIGH (ref 70–99)
HCT VFR BLD CALC: 22.8 % — LOW (ref 37–47)
HGB BLD-MCNC: 7.1 G/DL — LOW (ref 12–16)
POTASSIUM SERPL-MCNC: 4.8 MMOL/L — SIGNIFICANT CHANGE UP (ref 3.5–5)
POTASSIUM SERPL-SCNC: 4.8 MMOL/L — SIGNIFICANT CHANGE UP (ref 3.5–5)
SODIUM SERPL-SCNC: 149 MMOL/L — HIGH (ref 135–146)

## 2025-06-01 PROCEDURE — 99232 SBSQ HOSP IP/OBS MODERATE 35: CPT | Mod: 24,25

## 2025-06-01 RX ADMIN — Medication 40 MILLIGRAM(S): at 06:17

## 2025-06-01 RX ADMIN — IPRATROPIUM BROMIDE AND ALBUTEROL SULFATE 3 MILLILITER(S): .5; 2.5 SOLUTION RESPIRATORY (INHALATION) at 01:50

## 2025-06-01 RX ADMIN — Medication 4 MILLILITER(S): at 20:37

## 2025-06-01 RX ADMIN — Medication 40 MILLIGRAM(S): at 17:05

## 2025-06-01 RX ADMIN — NYSTATIN 1 APPLICATION(S): 100000 CREAM TOPICAL at 17:05

## 2025-06-01 RX ADMIN — IPRATROPIUM BROMIDE AND ALBUTEROL SULFATE 3 MILLILITER(S): .5; 2.5 SOLUTION RESPIRATORY (INHALATION) at 20:37

## 2025-06-01 RX ADMIN — RIVAROXABAN 20 MILLIGRAM(S): 10 TABLET, FILM COATED ORAL at 17:05

## 2025-06-01 RX ADMIN — IPRATROPIUM BROMIDE AND ALBUTEROL SULFATE 3 MILLILITER(S): .5; 2.5 SOLUTION RESPIRATORY (INHALATION) at 08:55

## 2025-06-01 RX ADMIN — NYSTATIN 1 APPLICATION(S): 100000 CREAM TOPICAL at 06:17

## 2025-06-01 RX ADMIN — Medication 4 MILLILITER(S): at 08:55

## 2025-06-01 RX ADMIN — Medication 4 MILLILITER(S): at 01:50

## 2025-06-01 RX ADMIN — Medication 1 APPLICATION(S): at 06:19

## 2025-06-01 RX ADMIN — Medication 4 MILLILITER(S): at 14:22

## 2025-06-01 RX ADMIN — IPRATROPIUM BROMIDE AND ALBUTEROL SULFATE 3 MILLILITER(S): .5; 2.5 SOLUTION RESPIRATORY (INHALATION) at 14:21

## 2025-06-01 RX ADMIN — Medication 25 GRAM(S): at 00:26

## 2025-06-01 NOTE — PROGRESS NOTE ADULT - ASSESSMENT
74y old Female with above PMHx, s/p exlap and second look laparotomy with billroth II and placement of J tube, 5/28 replacement of J to GJ by GI team.    Plan:  - Tolerating tube feeds via J, at goal rate of 35cc/hr x24hrs. Can attempt 47/hr X 18 hrs would be goal. Eventually getting to 70/hr X 12 hr.  - PPI BID per GI recs end date July 23rd, 2025  - monitor respiratory status, on RA +trach collar  - monitor vik drain output, serous  - Monitor uop, diuresis prn  - CM: [facility], pending transportation    SURGERY SPECTRA: [id number]

## 2025-06-01 NOTE — PROGRESS NOTE ADULT - SUBJECTIVE AND OBJECTIVE BOX
GENERAL SURGERY PROGRESS NOTE     PATRICIA SPRAGUE  74[facility]    POD: 15d  Procedure: Partial antrectomy    US Doppler guided insertion of central venous catheter    Laparotomy, second look, with packing removal    Billroth II operation    Replacement of gastrojejunostomy tube      24 hour events: Tolerating J tube feeds. Midlines removed, LUE PICC line still intact. Pending transportation to [facility]. VIK serous.    PHYSICAL EXAM:  GENERAL: NAD, well-appearing  CHEST/LUNG: Equal chest rise bilaterally, on RA  HEART: Regular rate and rhythm  ABDOMEN: Soft, Nontender, Nondistended; dressings intact, vik in place (serous). GJ intact  EXTREMITIES:  No clubbing, cyanosis, or edema        T(F): 97.8 (05-31-25 @ 16:06), Max: 98.1 (05-31-25 @ 08:13)  HR: 85 (05-31-25 @ 16:06) (61 - 85)  BP: 110/71 (05-31-25 @ 16:06) (93/54 - 110/71)  ABP: --  ABP(mean): --  RR: 19 (05-31-25 @ 16:06) (18 - 20)  SpO2: 97% (05-31-25 @ 16:06) (97% - 100%)    IN'S / OUT's:    05-30-25 @ 07:01  -  05-31-25 @ 07:00  --------------------------------------------------------  IN:    Glucerna: 620 mL    TPN (Total Parenteral Nutrition): 725 mL  Total IN: 1345 mL    OUT:    Bulb (mL): 40 mL    Indwelling Catheter - Urethral (mL): 1100 mL  Total OUT: 1140 mL    Total NET: 205 mL      05-31-25 @ 07:01  -  06-01-25 @ 00:12  --------------------------------------------------------  IN:    Enteral Tube Flush: 30 mL    Pivot 1.5: 595 mL  Total IN: 625 mL    OUT:    Bulb (mL): 15 mL    Indwelling Catheter - Urethral (mL): 780 mL  Total OUT: 795 mL    Total NET: -170 mL          MEDICATIONS  (STANDING):  albuterol/ipratropium for Nebulization 3 milliLiter(s) Nebulizer every 6 hours  chlorhexidine 2% Cloths 1 Application(s) Topical <User Schedule>  magnesium sulfate  IVPB 2 Gram(s) IV Intermittent once  nystatin Powder 1 Application(s) Topical two times a day  pantoprazole  Injectable 40 milliGRAM(s) IV Push two times a day  rivaroxaban 20 milliGRAM(s) Oral with dinner  sodium chloride 3%  Inhalation 4 milliLiter(s) Inhalation every 6 hours    MEDICATIONS  (PRN):  acetaminophen   Oral Liquid .. 650 milliGRAM(s) Oral every 6 hours PRN Mild Pain (1 - 3)  sodium chloride 0.9% lock flush 10 milliLiter(s) IV Push every 1 hour PRN Pre/post blood products, medications, blood draw, and to maintain line patency      LABS  Labs:  CAPILLARY BLOOD GLUCOSE      POCT Blood Glucose.: 119 mg/dL (31 May 2025 23:56)  POCT Blood Glucose.: 102 mg/dL (31 May 2025 21:15)  POCT Blood Glucose.: 116 mg/dL (31 May 2025 16:45)  POCT Blood Glucose.: 118 mg/dL (31 May 2025 11:11)                          6.9    6.23  )-----------( 196      ( 31 May 2025 20:59 )             21.9       Auto Immature Granulocyte %: 0.3 % (05-31-25 @ 20:59)  Auto Immature Granulocyte %: 0.8 % (05-31-25 @ 07:06)    05-31    148[H]  |  115[H]  |  36[H]  ----------------------------<  94  4.9   |  23  |  0.6[L]      Calcium: 9.4 mg/dL (05-31-25 @ 20:59)      LFTs:         Coags:            Urinalysis Basic - ( 31 May 2025 20:59 )    Color: x / Appearance: x / SG: x / pH: x  Gluc: 94 mg/dL / Ketone: x  / Bili: x / Urobili: x   Blood: x / Protein: x / Nitrite: x   Leuk Esterase: x / RBC: x / WBC x   Sq Epi: x / Non Sq Epi: x / Bacteria: x            RADIOLOGY & ADDITIONAL TESTS:

## 2025-06-02 LAB
ANION GAP SERPL CALC-SCNC: 10 MMOL/L — SIGNIFICANT CHANGE UP (ref 7–14)
ANION GAP SERPL CALC-SCNC: 17 MMOL/L — HIGH (ref 7–14)
BASOPHILS # BLD AUTO: 0.04 K/UL — SIGNIFICANT CHANGE UP (ref 0–0.2)
BASOPHILS NFR BLD AUTO: 0.6 % — SIGNIFICANT CHANGE UP (ref 0–1)
BUN SERPL-MCNC: 25 MG/DL — HIGH (ref 10–20)
BUN SERPL-MCNC: 28 MG/DL — HIGH (ref 10–20)
CALCIUM SERPL-MCNC: 9.6 MG/DL — SIGNIFICANT CHANGE UP (ref 8.4–10.5)
CALCIUM SERPL-MCNC: 9.8 MG/DL — SIGNIFICANT CHANGE UP (ref 8.4–10.5)
CHLORIDE SERPL-SCNC: 109 MMOL/L — SIGNIFICANT CHANGE UP (ref 98–110)
CHLORIDE SERPL-SCNC: 114 MMOL/L — HIGH (ref 98–110)
CO2 SERPL-SCNC: 18 MMOL/L — SIGNIFICANT CHANGE UP (ref 17–32)
CO2 SERPL-SCNC: 22 MMOL/L — SIGNIFICANT CHANGE UP (ref 17–32)
CREAT SERPL-MCNC: 0.6 MG/DL — LOW (ref 0.7–1.5)
CREAT SERPL-MCNC: 0.6 MG/DL — LOW (ref 0.7–1.5)
EGFR: 95 ML/MIN/1.73M2 — SIGNIFICANT CHANGE UP
EOSINOPHIL # BLD AUTO: 0.83 K/UL — HIGH (ref 0–0.7)
EOSINOPHIL NFR BLD AUTO: 12 % — HIGH (ref 0–8)
GLUCOSE SERPL-MCNC: 85 MG/DL — SIGNIFICANT CHANGE UP (ref 70–99)
GLUCOSE SERPL-MCNC: 93 MG/DL — SIGNIFICANT CHANGE UP (ref 70–99)
HCT VFR BLD CALC: 21.9 % — LOW (ref 37–47)
HCT VFR BLD CALC: 25.2 % — LOW (ref 37–47)
HGB BLD-MCNC: 6.8 G/DL — CRITICAL LOW (ref 12–16)
HGB BLD-MCNC: 7.8 G/DL — LOW (ref 12–16)
IMM GRANULOCYTES NFR BLD AUTO: 0.3 % — SIGNIFICANT CHANGE UP (ref 0.1–0.3)
IRON SATN MFR SERPL: 51 % — HIGH (ref 15–50)
IRON SATN MFR SERPL: 84 UG/DL — SIGNIFICANT CHANGE UP (ref 35–150)
LYMPHOCYTES # BLD AUTO: 1.7 K/UL — SIGNIFICANT CHANGE UP (ref 1.2–3.4)
LYMPHOCYTES # BLD AUTO: 24.5 % — SIGNIFICANT CHANGE UP (ref 20.5–51.1)
MAGNESIUM SERPL-MCNC: 1.5 MG/DL — LOW (ref 1.8–2.4)
MAGNESIUM SERPL-MCNC: 1.8 MG/DL — SIGNIFICANT CHANGE UP (ref 1.8–2.4)
MCHC RBC-ENTMCNC: 27.9 PG — SIGNIFICANT CHANGE UP (ref 27–31)
MCHC RBC-ENTMCNC: 28 PG — SIGNIFICANT CHANGE UP (ref 27–31)
MCHC RBC-ENTMCNC: 31 G/DL — LOW (ref 32–37)
MCHC RBC-ENTMCNC: 31.1 G/DL — LOW (ref 32–37)
MCV RBC AUTO: 90 FL — SIGNIFICANT CHANGE UP (ref 81–99)
MCV RBC AUTO: 90.1 FL — SIGNIFICANT CHANGE UP (ref 81–99)
MONOCYTES # BLD AUTO: 0.53 K/UL — SIGNIFICANT CHANGE UP (ref 0.1–0.6)
MONOCYTES NFR BLD AUTO: 7.6 % — SIGNIFICANT CHANGE UP (ref 1.7–9.3)
NEUTROPHILS # BLD AUTO: 3.81 K/UL — SIGNIFICANT CHANGE UP (ref 1.4–6.5)
NEUTROPHILS NFR BLD AUTO: 55 % — SIGNIFICANT CHANGE UP (ref 42.2–75.2)
NRBC BLD AUTO-RTO: 0 /100 WBCS — SIGNIFICANT CHANGE UP (ref 0–0)
NRBC BLD AUTO-RTO: 0 /100 WBCS — SIGNIFICANT CHANGE UP (ref 0–0)
PHOSPHATE SERPL-MCNC: 1.9 MG/DL — LOW (ref 2.1–4.9)
PHOSPHATE SERPL-MCNC: 3.3 MG/DL — SIGNIFICANT CHANGE UP (ref 2.1–4.9)
PLATELET # BLD AUTO: 193 K/UL — SIGNIFICANT CHANGE UP (ref 130–400)
PLATELET # BLD AUTO: 214 K/UL — SIGNIFICANT CHANGE UP (ref 130–400)
PMV BLD: 12.3 FL — HIGH (ref 7.4–10.4)
PMV BLD: 12.3 FL — HIGH (ref 7.4–10.4)
POTASSIUM SERPL-MCNC: 4.7 MMOL/L — SIGNIFICANT CHANGE UP (ref 3.5–5)
POTASSIUM SERPL-MCNC: 5.4 MMOL/L — HIGH (ref 3.5–5)
POTASSIUM SERPL-SCNC: 4.7 MMOL/L — SIGNIFICANT CHANGE UP (ref 3.5–5)
POTASSIUM SERPL-SCNC: 5.4 MMOL/L — HIGH (ref 3.5–5)
RBC # BLD: 2.43 M/UL — LOW (ref 4.2–5.4)
RBC # BLD: 2.8 M/UL — LOW (ref 4.2–5.4)
RBC # FLD: 15.4 % — HIGH (ref 11.5–14.5)
RBC # FLD: 16.3 % — HIGH (ref 11.5–14.5)
SODIUM SERPL-SCNC: 144 MMOL/L — SIGNIFICANT CHANGE UP (ref 135–146)
SODIUM SERPL-SCNC: 146 MMOL/L — SIGNIFICANT CHANGE UP (ref 135–146)
TIBC SERPL-MCNC: 165 UG/DL — LOW (ref 220–430)
TRANSFERRIN SERPL-MCNC: 151 MG/DL — LOW (ref 200–360)
UIBC SERPL-MCNC: 81 UG/DL — LOW (ref 110–370)
WBC # BLD: 6.93 K/UL — SIGNIFICANT CHANGE UP (ref 4.8–10.8)
WBC # BLD: 8.53 K/UL — SIGNIFICANT CHANGE UP (ref 4.8–10.8)
WBC # FLD AUTO: 6.93 K/UL — SIGNIFICANT CHANGE UP (ref 4.8–10.8)
WBC # FLD AUTO: 8.53 K/UL — SIGNIFICANT CHANGE UP (ref 4.8–10.8)

## 2025-06-02 PROCEDURE — 93970 EXTREMITY STUDY: CPT | Mod: 26

## 2025-06-02 PROCEDURE — 99222 1ST HOSP IP/OBS MODERATE 55: CPT

## 2025-06-02 RX ORDER — POTASSIUM PHOSPHATE, MONOBASIC POTASSIUM PHOSPHATE, DIBASIC INJECTION, 236; 224 MG/ML; MG/ML
30 SOLUTION, CONCENTRATE INTRAVENOUS ONCE
Refills: 0 | Status: COMPLETED | OUTPATIENT
Start: 2025-06-02 | End: 2025-06-02

## 2025-06-02 RX ORDER — MAGNESIUM SULFATE 500 MG/ML
2 SYRINGE (ML) INJECTION ONCE
Refills: 0 | Status: COMPLETED | OUTPATIENT
Start: 2025-06-02 | End: 2025-06-02

## 2025-06-02 RX ADMIN — Medication 40 MILLIGRAM(S): at 17:02

## 2025-06-02 RX ADMIN — Medication 4 MILLILITER(S): at 14:04

## 2025-06-02 RX ADMIN — Medication 4 MILLILITER(S): at 19:51

## 2025-06-02 RX ADMIN — Medication 4 MILLILITER(S): at 07:42

## 2025-06-02 RX ADMIN — Medication 12.5 GRAM(S): at 08:10

## 2025-06-02 RX ADMIN — NYSTATIN 1 APPLICATION(S): 100000 CREAM TOPICAL at 05:40

## 2025-06-02 RX ADMIN — IPRATROPIUM BROMIDE AND ALBUTEROL SULFATE 3 MILLILITER(S): .5; 2.5 SOLUTION RESPIRATORY (INHALATION) at 14:04

## 2025-06-02 RX ADMIN — IPRATROPIUM BROMIDE AND ALBUTEROL SULFATE 3 MILLILITER(S): .5; 2.5 SOLUTION RESPIRATORY (INHALATION) at 19:52

## 2025-06-02 RX ADMIN — IPRATROPIUM BROMIDE AND ALBUTEROL SULFATE 3 MILLILITER(S): .5; 2.5 SOLUTION RESPIRATORY (INHALATION) at 07:42

## 2025-06-02 RX ADMIN — Medication 1 APPLICATION(S): at 05:35

## 2025-06-02 RX ADMIN — Medication 40 MILLIGRAM(S): at 05:40

## 2025-06-02 RX ADMIN — Medication 4 MILLILITER(S): at 01:52

## 2025-06-02 RX ADMIN — IPRATROPIUM BROMIDE AND ALBUTEROL SULFATE 3 MILLILITER(S): .5; 2.5 SOLUTION RESPIRATORY (INHALATION) at 01:51

## 2025-06-02 RX ADMIN — NYSTATIN 1 APPLICATION(S): 100000 CREAM TOPICAL at 17:02

## 2025-06-02 RX ADMIN — Medication 25 GRAM(S): at 18:38

## 2025-06-02 RX ADMIN — RIVAROXABAN 20 MILLIGRAM(S): 10 TABLET, FILM COATED ORAL at 17:02

## 2025-06-02 RX ADMIN — POTASSIUM PHOSPHATE, MONOBASIC POTASSIUM PHOSPHATE, DIBASIC INJECTION, 83.33 MILLIMOLE(S): 236; 224 SOLUTION, CONCENTRATE INTRAVENOUS at 12:58

## 2025-06-02 NOTE — CHART NOTE - NSCHARTNOTESELECT_GEN_ALL_CORE
Gastroenterology Nutrition Support/Nutrition Services
Gastroenterology Nutrition/Nutrition Services
PEGJ tube placement/Event Note
POC/Event Note
Post op check/Event Note
Transfer Note
Anesthesia/Event Note
Dietitian Follow-Up/Nutrition Services
Event Note
Gastroenterology Nutrition Support/Nutrition Services
PACU Note
Transfer Note

## 2025-06-02 NOTE — PROGRESS NOTE ADULT - SUBJECTIVE AND OBJECTIVE BOX
VASCULAR SURGERY PROGRESS NOTE     Patient: PATRICIA SPRAGUE , 74y ([birth year])Female   MRN: [id number]  Location: [address]  Visit: 05-13-25 Inpatient  Date: 06-02-25 @ 08:13        Admitted :05-13-25 (20d)  LOS: 20d    Procedure/Dx/Injuries: Perforated peptic ulcer    Perforated duodenal ulcer    Perforated peptic ulcer    Septic shock    Acute respiratory failure with hypoxia    Encounter for palliative care    Partial antrectomy    US Doppler guided insertion of central venous catheter    Laparotomy, second look, with packing removal    Billroth II operation    Replacement of gastrojejunostomy tube        Events of past 24 hours:   Patient seen and examined at bedside.   AM CBC Hgb 6.8 from 7.1. Hypernatremia resolved, 146 from 149.   No acute events overnight. Afebrile, VSS.  >>> <<<>>> <<<>>> <<<>>> <<<>>> <<<>>> <<<>>> <<<>>> <<<>>> <<<>>> <<<    Vitals:   T(F): 97.5 (06-02-25 @ 00:00), Max: 98.5 (06-01-25 @ 16:02)  HR: 79 (06-02-25 @ 00:00)  BP: 109/67 (06-02-25 @ 00:00)  RR: 20 (06-02-25 @ 02:44)  SpO2: 98% (06-02-25 @ 02:44)      PHYSICAL EXAM:  GENERAL: NAD  CHEST/LUNG: No labored breathing, equal chest rise and fall   HEART: Normal rate   ABDOMEN: Soft, nontender, nondistended; dressings intact, vik in place, serous output. GJ intact  EXTREMITIES: No clubbing, cyanosis, or edema  >>> <<<>>> <<<>>> <<<>>> <<<>>> <<<>>> <<<>>> <<<>>> <<<>>> <<<>>> <<<    Is & Os:   Diet, NPO:   Tube Feeding Modality: Jejunostomy  Pivot 1.5 Jeff (PIVOT1.5RTH)  Total Volume for 24 Hours (mL): 840  Continuous  Starting Tube Feed Rate mL per Hour: 25  Increase Tube Feed Rate by (mL): 10     Every 4 hours  Until Goal Tube Feed Rate (mL per Hour): 35  Tube Feed Duration (in Hours): 24  Tube Feed Start Time: 11:00    Fluids:     06-01-25 @ 07:01  -  06-02-25 @ 07:00  --------------------------------------------------------  IN:  Total IN: 0 mL    OUT:    Bulb (mL): 30 mL    Indwelling Catheter - Urethral (mL): 1400 mL  Total OUT: 1430 mL    Total NET: -1430 mL  >>> <<<>>> <<<>>> <<<>>> <<<>>> <<<>>> <<<>>> <<<>>> <<<>>> <<<>>> <<<    MEDICATIONS  (STANDING):  albuterol/ipratropium for Nebulization 3 milliLiter(s) Nebulizer every 6 hours  chlorhexidine 2% Cloths 1 Application(s) Topical <User Schedule>  nystatin Powder 1 Application(s) Topical two times a day  pantoprazole  Injectable 40 milliGRAM(s) IV Push two times a day  rivaroxaban 20 milliGRAM(s) Oral with dinner  sodium chloride 3%  Inhalation 4 milliLiter(s) Inhalation every 6 hours    MEDICATIONS  (PRN):  acetaminophen   Oral Liquid .. 650 milliGRAM(s) Oral every 6 hours PRN Mild Pain (1 - 3)  sodium chloride 0.9% lock flush 10 milliLiter(s) IV Push every 1 hour PRN Pre/post blood products, medications, blood draw, and to maintain line patency      GI PROPHYLAXIS: pantoprazole  Injectable 40 milliGRAM(s) IV Push two times a day  >>> <<<>>> <<<>>> <<<>>> <<<>>> <<<>>> <<<>>> <<<>>> <<<>>> <<<>>> <<<        LAB/STUDIES:  Labs:  CAPILLARY BLOOD GLUCOSE      POCT Blood Glucose.: 116 mg/dL (02 Jun 2025 08:08)  POCT Blood Glucose.: 119 mg/dL (01 Jun 2025 21:17)  POCT Blood Glucose.: 114 mg/dL (01 Jun 2025 16:41)                          6.8    6.93  )-----------( 193      ( 02 Jun 2025 05:22 )             21.9       Auto Immature Granulocyte %: 0.3 % (06-02-25 @ 05:22)    06-02    146  |  114[H]  |  28[H]  ----------------------------<  93  4.7   |  22  |  0.6[L]      Calcium: 9.8 mg/dL (06-02-25 @ 05:22)          Urinalysis Basic - ( 02 Jun 2025 05:22 )    Color: x / Appearance: x / SG: x / pH: x  Gluc: 93 mg/dL / Ketone: x  / Bili: x / Urobili: x   Blood: x / Protein: x / Nitrite: x   Leuk Esterase: x / RBC: x / WBC x   Sq Epi: x / Non Sq Epi: x / Bacteria: x              >>> <<<>>> <<<>>> <<<>>> <<<>>> <<<>>> <<<>>> <<<>>> <<<>>> <<<>>> <<<  ASSESSMENT:  74y old Female with above PMHx, s/p exlap and second look laparotomy with billroth II and placement of J tube, 5/28 replacement of J to GJ by GI team.    Plan:  - Tolerating tube feeds via J, at goal rate of 35cc/hr x24hrs. Can attempt 47/hr X 18 hrs would be goal. Eventually getting to 70/hr X 12 hr.  - PPI BID per GI recs end date July 23rd, 2025  - Monitor respiratory status, on RA +trach collar  - Monitor vik drain output, serous  - Monitor uop, diuresis prn  - Monitor Hgb  - Poss D/C today       EGS SPECTRA: [id number]

## 2025-06-02 NOTE — CONSULT NOTE ADULT - CONSULT REASON
perforated duodenal ulcer
GJ tube placement
PICC
GOC/support
NOLA
s/p perforated peptic ulcer, new onset aflutter/PVCs, overnight ep of vtach, self resolved
management for anemia and hypernatremia
perforated ulcer

## 2025-06-02 NOTE — CONSULT NOTE ADULT - ASSESSMENT
81F PMHx HTN, a-fib (on xarelto), solitary kidney, DM, GERD, prior open cholecystectomy c/b suture granulomas s/p 2019 abdominal wall debridements by Dr. Banda, respiratory distress with prolonged ventilation during 11/2024 admission, s/p 11/19 tracheostomy and gastrostomy and feeding jejunostomy tube by Dr. Champion, ya catheter dependence who presented to the ED from NH on 5/13 with 4 days progressively worsening abdominal pain. Found to have perforated peptic ulcer. S/p EX LAP AND ANTRECTOMY, ABD LEFT OPEN W/ ABTHERA and then 5/17 S/P 2ND LOOK LAP, BILROTH 2, PLACEMENT OF J tube.    #Perforated duodenal ulcer s/p ex lap/antrectomy, bilroth 2  #hx GERD  - duodenal ulcer on CT imaging  - s/p antrectomy 5/14, Laparotomy, second look, with packing removal 5/16  - s/p J tube exchanged with GJ by GI 5/28  - s/p septic shocker on pressors, finished course of meropenem  - s/p PICC with TPN, now on tube feeds  - per surgery, monitor BULMARO drain  - PPI BID per GI until 7/23    #Acute on Chronic anemia  - acute blood loss 2/2 perforated ulcer  - Hgb downtrended, s/p 1 unit pRBC 5/17, 2nd unit pRBC 6/2  - no active signs of bleeding per nursing  - monitor H/H  - consider iron supplementation given setting of acute blood loss if H/H remains stable    #Hypernatremia, resolving  - s/p IV lasix diuresis  - likely 2/2 renal water loss from diuresis   - Na improving s/p adding free water flushes  - can target free water deficit for correction if Na worsens, can increase free water flushes     #Stress induced cardiomyopathy with reduced EF, recovered  #HTN  #Chronic Afib  - TTE 5/15/25: EF 35%, several WMA  - repeat TTE 5/28/25: EF 65-70%  - rate controlled  - evaluated by cardio during hospitalization 5/15, 5/16  - on Xarelto 20mg qhs      DVT ppx: Xarelto  GI ppx: Protonix  Diet: Tube feeds  Full Code    Management per surgery team, hospitalist for co-management       81F PMHx HTN, a-fib (on xarelto), solitary kidney, DM, GERD, prior open cholecystectomy c/b suture granulomas s/p 2019 abdominal wall debridements by Dr. Banda, respiratory distress with prolonged ventilation during 11/2024 admission, s/p 11/19 tracheostomy and gastrostomy and feeding jejunostomy tube by Dr. Champion, ya catheter dependence who presented to the ED from NH on 5/13 with 4 days progressively worsening abdominal pain. Found to have perforated peptic ulcer. S/p EX LAP AND ANTRECTOMY, ABD LEFT OPEN W/ ABTHERA and then 5/17 S/P 2ND LOOK LAP, BILROTH 2, PLACEMENT OF J tube.    #Perforated duodenal ulcer s/p ex lap/antrectomy, bilroth 2  #hx GERD  - duodenal ulcer on CT imaging  - s/p antrectomy 5/14, Laparotomy, second look, with packing removal 5/16  - s/p J tube exchanged with GJ by GI 5/28  - s/p septic shocker on pressors, finished course of meropenem  - s/p PICC with TPN, now on tube feeds  - per surgery, monitor BULMARO drain  - PPI BID per GI until 7/23    #Acute on Chronic anemia  - acute blood loss 2/2 perforated ulcer  - Hgb downtrended, s/p 1 unit pRBC 5/17, 2nd unit pRBC 6/2  - no active signs of bleeding per nursing  - consider holding AC, check clinical signs of GI bleed  - monitor H/H  - if H/H stable post transfusion with no overt clinical signs of bleeding, can consider resuming AC within 24-48 hrs    #Hypernatremia, resolving  - s/p IV lasix diuresis  - likely 2/2 renal water loss from diuresis   - Na improving s/p adding free water flushes  - can target free water deficit for correction if Na worsens, can increase free water flushes     #Stress induced cardiomyopathy with reduced EF, recovered  #HTN  #Chronic Afib  - TTE 5/15/25: EF 35%, several WMA  - repeat TTE 5/28/25: EF 65-70%  - rate controlled  - PASHA-VASc 3  - evaluated by cardio during hospitalization 5/15, 5/16  - on Xarelto 20mg qhs -> consider holding and monitor for bleed      DVT ppx: Xarelto  GI ppx: Protonix  Diet: Tube feeds  Full Code    Management per surgery team, hospitalist for co-management

## 2025-06-02 NOTE — CONSULT NOTE ADULT - PROVIDER SPECIALTY LIST ADULT
Infectious Disease
Nephrology
Palliative Care
Gastroenterology
Cardiology
Hospitalist
Intervent Radiology
SICU

## 2025-06-02 NOTE — CONSULT NOTE ADULT - SUBJECTIVE AND OBJECTIVE BOX
PATRICIA SPRAGUE 74y Female  MRN#: [id number]     SUBJECTIVE  Patient is a 74y old Female who presents with a chief complaint of Septic (16 May 2025 12:06)    HPI:  81F PMHx HTN, a-fib (on xarelto), solitary kidney, DM, GERD, prior open cholecystectomy c/b suture granulomas s/p 2019 abdominal wall debridements by Dr. Banda, respiratory distress with prolonged ventilation during 11/2024 admission, s/p 11/19 tracheostomy and gastrostomy and feeding jejunostomy tube by Dr. Champion, ya catheter dependence who presented to the ED from NH on 5/13 with 4 days progressively worsening abdominal pain. Surgery consulted for imaging concerning for duodenal perforation. History limited by pt's minimally-verbal status. Spoke with daughter on phone, pt is full code at this time,    Interval/Overnight Events:  Noted to be anemic, requiring 1 unit pRBC transfusion.    Today is hospital day 20d, and this morning she is lying in bed without distress.   No acute overnight events.     OBJECTIVE  PAST MEDICAL & SURGICAL HISTORY  HTN (hypertension)    Diabetes mellitus    Obesity    Gastroesophageal reflux disease    Active asthma    Generalized OA    Afib    H/O hernia repair  2011 and revised in 2013    History of cholecystectomy    Status post debridement    H/O tracheostomy    S/P gastrostomy    S/P jejunostomy      ALLERGIES:  No Known Allergies    MEDICATIONS:  STANDING MEDICATIONS  albuterol/ipratropium for Nebulization 3 milliLiter(s) Nebulizer every 6 hours  chlorhexidine 2% Cloths 1 Application(s) Topical <User Schedule>  magnesium sulfate  IVPB 2 Gram(s) IV Intermittent once  nystatin Powder 1 Application(s) Topical two times a day  pantoprazole  Injectable 40 milliGRAM(s) IV Push two times a day  rivaroxaban 20 milliGRAM(s) Oral with dinner  sodium chloride 3%  Inhalation 4 milliLiter(s) Inhalation every 6 hours    PRN MEDICATIONS  acetaminophen   Oral Liquid .. 650 milliGRAM(s) Oral every 6 hours PRN  sodium chloride 0.9% lock flush 10 milliLiter(s) IV Push every 1 hour PRN    HOME MEDICATIONS  Home Medications:  acetaminophen 160 mg/5 mL oral suspension: 20.31 milliliter(s) orally every 6 hours as needed for Mild Pain (1 - 3) (31 May 2025 13:39)  albuterol 2.5 mg/3 mL (0.083%) inhalation solution: 3 milliliter(s) by nebulizer every 6 hours as needed for  shortness of breath and/or wheezing (13 May 2025 22:28)  alendronate 70 mg oral tablet: 1 tab(s) orally once a week 5 AM on Thursday (13 May 2025 21:20)  cholecalciferol 400 intl units (10 mcg) oral tablet: 1 tab(s) orally once a day (13 May 2025 22:28)  ipratropium: 1 unit(s) by nebulizer every 6 hours (ipratropium bromide inhalation 0.02% solution) (13 May 2025 22:28)  losartan 100 mg oral tablet: 1 tab(s) orally once a day (13 May 2025 21:29)  Multiple Vitamins with Minerals oral liquid: 10 milliliter(s) by jejunostomy tube once a day (13 May 2025 21:29)  pantoprazole 40 mg intravenous injection: 40 milligram(s) intravenous 2 times a day (31 May 2025 13:39)  rivaroxaban 15 mg oral tablet: 1 tab(s) orally once a day (at bedtime) (13 May 2025 21:17)  Sensipar 30 mg oral tablet: 1 tab(s) orally every 12 hours (13 May 2025 22:28)      LABS:                        6.8    6.93  )-----------( 193      ( 02 Jun 2025 05:22 )             21.9     06-02    146  |  114[H]  |  28[H]  ----------------------------<  93  4.7   |  22  |  0.6[L]    Ca    9.8      02 Jun 2025 05:22  Phos  1.9     06-02  Mg     1.5     06-02          Urinalysis Basic - ( 02 Jun 2025 05:22 )    Color: x / Appearance: x / SG: x / pH: x  Gluc: 93 mg/dL / Ketone: x  / Bili: x / Urobili: x   Blood: x / Protein: x / Nitrite: x   Leuk Esterase: x / RBC: x / WBC x   Sq Epi: x / Non Sq Epi: x / Bacteria: x                CAPILLARY BLOOD GLUCOSE      POCT Blood Glucose.: 114 mg/dL (02 Jun 2025 11:18)      PHYSICAL EXAM:  T(C): 36.4 (06-02-25 @ 10:53), Max: 36.9 (06-01-25 @ 16:02)  HR: 83 (06-02-25 @ 10:53) (79 - 87)  BP: 108/70 (06-02-25 @ 10:53) (108/70 - 112/69)  RR: 17 (06-02-25 @ 10:53) (17 - 20)  SpO2: 99% (06-02-25 @ 10:53) (97% - 100%)    GENERAL: NAD  CHEST/LUNG: Equal chest rise bilaterally, diminished breath sounds bilaterally  HEART: Regular rate and rhythm  ABDOMEN: Soft, Nontender, Nondistended  EXTREMITIES:  No clubbing, cyanosis, or edema    ADMISSION SUMMARY  Patient is a 74y old Female who presents with a chief complaint of Septic (16 May 2025 12:06)

## 2025-06-02 NOTE — CONSULT NOTE ADULT - CONSULT REQUESTED DATE/TIME
13-May-2025 21:39
14-May-2025 09:21
14-May-2025 13:51
02-Jun-2025 13:13
21-May-2025 18:23
23-May-2025 12:12
14-May-2025 10:33
15-May-2025 09:55

## 2025-06-02 NOTE — CHART NOTE - NSCHARTNOTEFT_GEN_A_CORE
pt seen 5/30 and was not tolerating PMSV. pt not scheduled for SLP f/u today will be seen 6/3 for PMSV trials

## 2025-06-03 VITALS
TEMPERATURE: 98 F | RESPIRATION RATE: 18 BRPM | HEART RATE: 86 BPM | OXYGEN SATURATION: 98 % | DIASTOLIC BLOOD PRESSURE: 66 MMHG | SYSTOLIC BLOOD PRESSURE: 105 MMHG

## 2025-06-03 LAB
ANION GAP SERPL CALC-SCNC: 6 MMOL/L — LOW (ref 7–14)
BUN SERPL-MCNC: 21 MG/DL — HIGH (ref 10–20)
CALCIUM SERPL-MCNC: 9.1 MG/DL — SIGNIFICANT CHANGE UP (ref 8.4–10.5)
CHLORIDE SERPL-SCNC: 108 MMOL/L — SIGNIFICANT CHANGE UP (ref 98–110)
CO2 SERPL-SCNC: 23 MMOL/L — SIGNIFICANT CHANGE UP (ref 17–32)
CREAT SERPL-MCNC: 0.5 MG/DL — LOW (ref 0.7–1.5)
EGFR: 100 ML/MIN/1.73M2 — SIGNIFICANT CHANGE UP
EGFR: 100 ML/MIN/1.73M2 — SIGNIFICANT CHANGE UP
FERRITIN SERPL-MCNC: 127 NG/ML — SIGNIFICANT CHANGE UP (ref 13–330)
GLUCOSE SERPL-MCNC: 86 MG/DL — SIGNIFICANT CHANGE UP (ref 70–99)
POTASSIUM SERPL-MCNC: 4.9 MMOL/L — SIGNIFICANT CHANGE UP (ref 3.5–5)
POTASSIUM SERPL-SCNC: 4.9 MMOL/L — SIGNIFICANT CHANGE UP (ref 3.5–5)
SODIUM SERPL-SCNC: 137 MMOL/L — SIGNIFICANT CHANGE UP (ref 135–146)

## 2025-06-03 PROCEDURE — 99232 SBSQ HOSP IP/OBS MODERATE 35: CPT

## 2025-06-03 PROCEDURE — 99238 HOSP IP/OBS DSCHRG MGMT 30/<: CPT | Mod: 24,25

## 2025-06-03 RX ORDER — MAGNESIUM SULFATE 500 MG/ML
1 SYRINGE (ML) INJECTION ONCE
Refills: 0 | Status: DISCONTINUED | OUTPATIENT
Start: 2025-06-03 | End: 2025-06-03

## 2025-06-03 RX ADMIN — IPRATROPIUM BROMIDE AND ALBUTEROL SULFATE 3 MILLILITER(S): .5; 2.5 SOLUTION RESPIRATORY (INHALATION) at 01:29

## 2025-06-03 RX ADMIN — Medication 4 MILLILITER(S): at 01:33

## 2025-06-03 RX ADMIN — Medication 40 MILLIGRAM(S): at 05:15

## 2025-06-03 RX ADMIN — IPRATROPIUM BROMIDE AND ALBUTEROL SULFATE 3 MILLILITER(S): .5; 2.5 SOLUTION RESPIRATORY (INHALATION) at 08:04

## 2025-06-03 RX ADMIN — NYSTATIN 1 APPLICATION(S): 100000 CREAM TOPICAL at 05:14

## 2025-06-03 RX ADMIN — Medication 4 MILLILITER(S): at 08:04

## 2025-06-03 RX ADMIN — Medication 1 APPLICATION(S): at 05:13

## 2025-06-03 NOTE — PROGRESS NOTE ADULT - SUBJECTIVE AND OBJECTIVE BOX
PATRICIA SPRAGUE  74y  [facility]      Patient is a 74y old  Female who presents with a chief complaint of Septic (16 May 2025 12:06)      INTERVAL HPI/OVERNIGHT EVENTS:        REVIEW OF SYSTEMS:        FAMILY HISTORY:    T(C): 36.6 (06-03-25 @ 08:08), Max: 36.7 (06-02-25 @ 17:53)  HR: 81 (06-03-25 @ 08:08) (81 - 85)  BP: 110/61 (06-03-25 @ 08:08) (108/70 - 117/73)  RR: 18 (06-03-25 @ 08:08) (17 - 19)  SpO2: 99% (06-03-25 @ 08:08) (97% - 99%)  Wt(kg): --Vital Signs Last 24 Hrs  T(C): 36.6 (03 Jun 2025 08:08), Max: 36.7 (02 Jun 2025 17:53)  T(F): 97.9 (03 Jun 2025 08:08), Max: 98 (02 Jun 2025 17:53)  HR: 81 (03 Jun 2025 08:08) (81 - 85)  BP: 110/61 (03 Jun 2025 08:08) (108/70 - 117/73)  BP(mean): --  RR: 18 (03 Jun 2025 08:08) (17 - 19)  SpO2: 99% (03 Jun 2025 08:08) (97% - 99%)    Parameters below as of 03 Jun 2025 08:08  Patient On (Oxygen Delivery Method): tracheostomy collar        PHYSICAL EXAM:  GENERAL: NAD, well-groomed, well-developed  HEAD:  Atraumatic, Normocephalic  EYES: EOMI, PERRLA, conjunctiva and sclera clear  ENMT: No tonsillar erythema, exudates, or enlargement; Moist mucous membranes, Good dentition, No lesions  NECK: Supple, No JVD, Normal thyroid  NERVOUS SYSTEM:  Alert & Oriented X3, Good concentration; Motor Strength 5/5 B/L upper and lower extremities; DTRs 2+ intact and symmetric  PULM: Clear to auscultation bilaterally  CARDIAC: Regular rate and rhythm; No murmurs, rubs, or gallops  GI: Soft, Nontender, Nondistended; Bowel sounds present  EXTREMITIES:  2+ Peripheral Pulses, No clubbing, cyanosis, or edema  LYMPH: No lymphadenopathy noted  SKIN: No rashes or lesions    Consultant(s) Notes Reviewed:  [x ] YES  [ ] NO  Care Discussed with Consultants/Other Providers [ x] YES  [ ] NO    LABS:                            7.8    8.53  )-----------( 214      ( 02 Jun 2025 16:00 )             25.2   06-03    137  |  108  |  21[H]  ----------------------------<  86  4.9   |  23  |  0.5[L]    Ca    9.1      03 Jun 2025 06:30  Phos  3.3     06-02  Mg     1.8     06-02              acetaminophen   Oral Liquid .. 650 milliGRAM(s) Oral every 6 hours PRN  albuterol/ipratropium for Nebulization 3 milliLiter(s) Nebulizer every 6 hours  chlorhexidine 2% Cloths 1 Application(s) Topical <User Schedule>  magnesium sulfate  IVPB 1 Gram(s) IV Intermittent once  nystatin Powder 1 Application(s) Topical two times a day  pantoprazole  Injectable 40 milliGRAM(s) IV Push two times a day  rivaroxaban 20 milliGRAM(s) Oral with dinner  sodium chloride 0.9% lock flush 10 milliLiter(s) IV Push every 1 hour PRN  sodium chloride 3%  Inhalation 4 milliLiter(s) Inhalation every 6 hours        81F PMHx HTN, a-fib (on xarelto), solitary kidney, DM, GERD, prior open cholecystectomy c/b suture granulomas s/p 2019 abdominal wall debridements by Dr. Banda, respiratory distress with prolonged ventilation during 11/2024 admission, s/p 11/19 tracheostomy and gastrostomy and feeding jejunostomy tube by Dr. Champion, ya catheter dependence who presented to the ED from NH on 5/13 with 4 days progressively worsening abdominal pain. Found to have perforated peptic ulcer. S/p EX LAP AND ANTRECTOMY, ABD LEFT OPEN W/ ABTHERA and then 5/17 S/P 2ND LOOK LAP, BILROTH 2, PLACEMENT OF J tube.    1.Septic shock due to  Perforated duodenal ulcer s/p ex lap/antrectomy, bilroth 2. hx GERD  - duodenal ulcer on CT imaging  - s/p antrectomy 5/14, Laparotomy, second look, with packing removal 5/16  - s/p J tube exchanged with GJ by GI 5/28  - s/p septic shock on pressors, finished course of meropenem  - s/p PICC with TPN, now on tube feeds  - per surgery, monitor BULMARO drain  - PPI BID per GI until 7/23    2.  acute blood loss anemia  2/2 perforated ulcer  - Hgb downtrended, s/p 1 unit pRBC 5/17, 2nd unit pRBC 6/2  - no active signs of bleeding per nursing  - consider holding AC, check clinical signs of GI bleed  - monitor H/H  - if H/H stable post transfusion with no overt clinical signs of bleeding, Xarelto was resumed   3. Hypernatremia, resolving  - s/p IV lasix diuresis  - Na improving s/p adding free water flushes  - can target free water deficit for correction if Na worsens, can increase free water flushes     4.Stress induced cardiomyopathy with reduced EF, recovered/HTN/Chronic Afib  - TTE 5/15/25: EF 35%, several WMA  - repeat TTE 5/28/25: EF 65-70%  - rate controlled  - PASHA-VASc 3  - evaluated by cardio during hospitalization 5/15, 5/16      5. New groundglass opacities in the right upper lobe compatible with a developing pneumonia    DVT ppx: Xarelto  GI ppx: Protonix  Diet: Tube feeds  Full Code    Management per surgery team, hospitalist for co-management     PATRICIA SPRAGUE  74y  [facility] [address]      Patient is a 74y old  Female who presents with a chief complaint of Septic (16 May 2025 12:06)      INTERVAL HPI/OVERNIGHT EVENTS:    Patient laying down . looks comfortable   still with diffuse rhonchi   no overnight events noted         FAMILY HISTORY:    T(C): 36.6 (06-03-25 @ 08:08), Max: 36.7 (06-02-25 @ 17:53)  HR: 81 (06-03-25 @ 08:08) (81 - 85)  BP: 110/61 (06-03-25 @ 08:08) (108/70 - 117/73)  RR: 18 (06-03-25 @ 08:08) (17 - 19)  SpO2: 99% (06-03-25 @ 08:08) (97% - 99%)  Wt(kg): --Vital Signs Last 24 Hrs  T(C): 36.6 (03 Jun 2025 08:08), Max: 36.7 (02 Jun 2025 17:53)  T(F): 97.9 (03 Jun 2025 08:08), Max: 98 (02 Jun 2025 17:53)  HR: 81 (03 Jun 2025 08:08) (81 - 85)  BP: 110/61 (03 Jun 2025 08:08) (108/70 - 117/73)  BP(mean): --  RR: 18 (03 Jun 2025 08:08) (17 - 19)  SpO2: 99% (03 Jun 2025 08:08) (97% - 99%)    Parameters below as of 03 Jun 2025 08:08  Patient On (Oxygen Delivery Method): tracheostomy collar        PHYSICAL EXAM:  GENERAL: Laying down comfortable   PULM: Diffuse rhonchi   CARDIAC: Regular rate and rhythm;   GI: Soft, Nontender, Nondistended; Bowel sounds present  EXTREMITIES:  2+ Peripheral Pulses    Consultant(s) Notes Reviewed:  [x ] YES  [ ] NO  Care Discussed with Consultants/Other Providers [ x] YES  [ ] NO    LABS:                            7.8    8.53  )-----------( 214      ( 02 Jun 2025 16:00 )             25.2   06-03    137  |  108  |  21[H]  ----------------------------<  86  4.9   |  23  |  0.5[L]    Ca    9.1      03 Jun 2025 06:30  Phos  3.3     06-02  Mg     1.8     06-02              acetaminophen   Oral Liquid .. 650 milliGRAM(s) Oral every 6 hours PRN  albuterol/ipratropium for Nebulization 3 milliLiter(s) Nebulizer every 6 hours  chlorhexidine 2% Cloths 1 Application(s) Topical <User Schedule>  magnesium sulfate  IVPB 1 Gram(s) IV Intermittent once  nystatin Powder 1 Application(s) Topical two times a day  pantoprazole  Injectable 40 milliGRAM(s) IV Push two times a day  rivaroxaban 20 milliGRAM(s) Oral with dinner  sodium chloride 0.9% lock flush 10 milliLiter(s) IV Push every 1 hour PRN  sodium chloride 3%  Inhalation 4 milliLiter(s) Inhalation every 6 hours        81F PMHx HTN, a-fib (on xarelto), solitary kidney, DM, GERD, prior open cholecystectomy c/b suture granulomas s/p 2019 abdominal wall debridements by Dr. Banda, respiratory distress with prolonged ventilation during 11/2024 admission, s/p 11/19 tracheostomy and gastrostomy and feeding jejunostomy tube by Dr. Champion, ya catheter dependence who presented to the ED from NH on 5/13 with 4 days progressively worsening abdominal pain. Found to have perforated peptic ulcer. S/p EX LAP AND ANTRECTOMY, ABD LEFT OPEN W/ ABTHERA and then 5/17 S/P 2ND LOOK LAP, BILROTH 2, PLACEMENT OF J tube.    1.Septic shock due to  Perforated duodenal ulcer s/p ex lap/antrectomy, bilroth 2. hx GERD  - duodenal ulcer on CT imaging  - s/p antrectomy 5/14, Laparotomy, second look, with packing removal 5/16  - s/p J tube exchanged with GJ by GI 5/28  - s/p septic shock on pressors, finished course of meropenem  - s/p PICC with TPN, now on tube feeds  - per surgery, monitor BULMARO drain  - PPI BID per GI until 7/23    2.  acute blood loss anemia  2/2 perforated ulcer  - Hgb downtrended, s/p 1 unit pRBC 5/17, 2nd unit pRBC 6/2  - no active signs of bleeding per nursing  - consider holding AC, check clinical signs of GI bleed  - monitor H/H  - if H/H stable post transfusion with no overt clinical signs of bleeding, Xarelto was resumed   3. Hypernatremia, resolving  - s/p IV lasix diuresis  - Na improving s/p adding free water flushes  - can target free water deficit for correction if Na worsens, can increase free water flushes     4.Stress induced cardiomyopathy with reduced EF, recovered/HTN/Chronic Afib  - TTE 5/15/25: EF 35%, several WMA  - repeat TTE 5/28/25: EF 65-70%  - rate controlled  - PASHA-VASc 3  - evaluated by cardio during hospitalization 5/15, 5/16      5. New groundglass opacities in the right upper lobe compatible with a developing pneumonia  - Already on Meropenem which should cover  - Might need help with secretions. Suction as needed it. keep using humidifier     DVT ppx: Xarelto  GI ppx: Protonix  Diet: Tube feeds  Full Code    Management per surgery team, hospitalist for co-management     PATRICIA SPRAGUE  74y  [facility] [address]      Patient is a 74y old  Female who presents with a chief complaint of Septic (16 May 2025 12:06)      INTERVAL HPI/OVERNIGHT EVENTS:    Patient laying down . looks comfortable   still with diffuse rhonchi   no overnight events noted         FAMILY HISTORY:    T(C): 36.6 (06-03-25 @ 08:08), Max: 36.7 (06-02-25 @ 17:53)  HR: 81 (06-03-25 @ 08:08) (81 - 85)  BP: 110/61 (06-03-25 @ 08:08) (108/70 - 117/73)  RR: 18 (06-03-25 @ 08:08) (17 - 19)  SpO2: 99% (06-03-25 @ 08:08) (97% - 99%)  Wt(kg): --Vital Signs Last 24 Hrs  T(C): 36.6 (03 Jun 2025 08:08), Max: 36.7 (02 Jun 2025 17:53)  T(F): 97.9 (03 Jun 2025 08:08), Max: 98 (02 Jun 2025 17:53)  HR: 81 (03 Jun 2025 08:08) (81 - 85)  BP: 110/61 (03 Jun 2025 08:08) (108/70 - 117/73)  BP(mean): --  RR: 18 (03 Jun 2025 08:08) (17 - 19)  SpO2: 99% (03 Jun 2025 08:08) (97% - 99%)    Parameters below as of 03 Jun 2025 08:08  Patient On (Oxygen Delivery Method): tracheostomy collar        PHYSICAL EXAM:  GENERAL: Laying down comfortable   PULM: Diffuse rhonchi   CARDIAC: Regular rate and rhythm;   GI: Soft, Nontender, Nondistended; Bowel sounds present  EXTREMITIES:  2+ Peripheral Pulses    Consultant(s) Notes Reviewed:  [x ] YES  [ ] NO  Care Discussed with Consultants/Other Providers [ x] YES  [ ] NO    LABS:                            7.8    8.53  )-----------( 214      ( 02 Jun 2025 16:00 )             25.2   06-03    137  |  108  |  21[H]  ----------------------------<  86  4.9   |  23  |  0.5[L]    Ca    9.1      03 Jun 2025 06:30  Phos  3.3     06-02  Mg     1.8     06-02              acetaminophen   Oral Liquid .. 650 milliGRAM(s) Oral every 6 hours PRN  albuterol/ipratropium for Nebulization 3 milliLiter(s) Nebulizer every 6 hours  chlorhexidine 2% Cloths 1 Application(s) Topical <User Schedule>  magnesium sulfate  IVPB 1 Gram(s) IV Intermittent once  nystatin Powder 1 Application(s) Topical two times a day  pantoprazole  Injectable 40 milliGRAM(s) IV Push two times a day  rivaroxaban 20 milliGRAM(s) Oral with dinner  sodium chloride 0.9% lock flush 10 milliLiter(s) IV Push every 1 hour PRN  sodium chloride 3%  Inhalation 4 milliLiter(s) Inhalation every 6 hours        81F PMHx HTN, a-fib (on xarelto), solitary kidney, DM, GERD, prior open cholecystectomy c/b suture granulomas s/p 2019 abdominal wall debridements by Dr. Banda, respiratory distress with prolonged ventilation during 11/2024 admission, s/p 11/19 tracheostomy and gastrostomy and feeding jejunostomy tube by Dr. Champion, ya catheter dependence who presented to the ED from NH on 5/13 with 4 days progressively worsening abdominal pain. Found to have perforated peptic ulcer. S/p EX LAP AND ANTRECTOMY, ABD LEFT OPEN W/ ABTHERA and then 5/17 S/P 2ND LOOK LAP, BILROTH 2, PLACEMENT OF J tube.    1.Septic shock due to  Perforated duodenal ulcer s/p ex lap/antrectomy, bilroth 2. hx GERD  - duodenal ulcer on CT imaging  - s/p antrectomy 5/14, Laparotomy, second look, with packing removal 5/16  - s/p J tube exchanged with GJ by GI 5/28  - s/p septic shock on pressors, finished course of meropenem  - s/p PICC with TPN, now on tube feeds  - per surgery, monitor BULMARO drain  - PPI BID per GI until 7/23    2.  acute blood loss anemia  2/2 perforated ulcer  - Hgb downtrended, s/p 1 unit pRBC 5/17, 2nd unit pRBC 6/2  - no active signs of bleeding per nursing  - consider holding AC, check clinical signs of GI bleed  - monitor H/H  - if H/H stable post transfusion with no overt clinical signs of bleeding, Xarelto was resumed   3. Hypernatremia, resolving  - s/p IV lasix diuresis  - Na improving s/p adding free water flushes  - can target free water deficit for correction if Na worsens, can increase free water flushes     4.Stress induced cardiomyopathy with reduced EF, recovered/HTN/Chronic Afib  - TTE 5/15/25: EF 35%, several WMA  - repeat TTE 5/28/25: EF 65-70%  - rate controlled  - PASHA-VASc 3  - evaluated by cardio during hospitalization 5/15, 5/16      5. New groundglass opacities in the right upper lobe compatible with a developing pneumonia .  s/p 11/19 tracheostomy and gastrostomy and feeding jejunostomy tube by Dr. Champion, ya catheter dependence  - Already on Meropenem which should cover  - Might need help with secretions. Suction as needed it. keep using humidifier     DVT ppx: Xarelto  GI ppx: Protonix  Diet: Tube feeds  Full Code    Management per surgery team, hospitalist for co-management

## 2025-06-03 NOTE — PROGRESS NOTE ADULT - PROVIDER SPECIALTY LIST ADULT
Gastroenterology
Infectious Disease
Nephrology
SICU
Surgery
Cardiology
Gastroenterology
Hospitalist
Infectious Disease
Nephrology
SICU
Surgery
Gastroenterology
Gastroenterology
Infectious Disease
Infectious Disease
SICU
Surgery
Infectious Disease
Palliative Care

## 2025-06-04 ENCOUNTER — APPOINTMENT (OUTPATIENT)
Dept: GASTROENTEROLOGY | Facility: CLINIC | Age: 75
End: 2025-06-04

## 2025-06-06 DIAGNOSIS — E83.52 HYPERCALCEMIA: ICD-10-CM

## 2025-06-06 DIAGNOSIS — I48.20 CHRONIC ATRIAL FIBRILLATION, UNSPECIFIED: ICD-10-CM

## 2025-06-06 DIAGNOSIS — I50.22 CHRONIC SYSTOLIC (CONGESTIVE) HEART FAILURE: ICD-10-CM

## 2025-06-06 DIAGNOSIS — I47.10 SUPRAVENTRICULAR TACHYCARDIA, UNSPECIFIED: ICD-10-CM

## 2025-06-06 DIAGNOSIS — Z79.01 LONG TERM (CURRENT) USE OF ANTICOAGULANTS: ICD-10-CM

## 2025-06-06 DIAGNOSIS — N17.9 ACUTE KIDNEY FAILURE, UNSPECIFIED: ICD-10-CM

## 2025-06-06 DIAGNOSIS — J45.909 UNSPECIFIED ASTHMA, UNCOMPLICATED: ICD-10-CM

## 2025-06-06 DIAGNOSIS — R65.21 SEVERE SEPSIS WITH SEPTIC SHOCK: ICD-10-CM

## 2025-06-06 DIAGNOSIS — A41.9 SEPSIS, UNSPECIFIED ORGANISM: ICD-10-CM

## 2025-06-06 DIAGNOSIS — U07.1 COVID-19: ICD-10-CM

## 2025-06-06 DIAGNOSIS — E87.20 ACIDOSIS, UNSPECIFIED: ICD-10-CM

## 2025-06-06 DIAGNOSIS — E11.9 TYPE 2 DIABETES MELLITUS WITHOUT COMPLICATIONS: ICD-10-CM

## 2025-06-06 DIAGNOSIS — E66.9 OBESITY, UNSPECIFIED: ICD-10-CM

## 2025-06-06 DIAGNOSIS — J96.01 ACUTE RESPIRATORY FAILURE WITH HYPOXIA: ICD-10-CM

## 2025-06-06 DIAGNOSIS — D84.81 IMMUNODEFICIENCY DUE TO CONDITIONS CLASSIFIED ELSEWHERE: ICD-10-CM

## 2025-06-25 ENCOUNTER — APPOINTMENT (OUTPATIENT)
Dept: SURGERY | Facility: CLINIC | Age: 75
End: 2025-06-25

## 2025-07-01 ENCOUNTER — APPOINTMENT (OUTPATIENT)
Dept: SURGERY | Facility: CLINIC | Age: 75
End: 2025-07-01
Payer: MEDICAID

## 2025-07-01 PROCEDURE — 99024 POSTOP FOLLOW-UP VISIT: CPT

## 2025-07-29 ENCOUNTER — APPOINTMENT (OUTPATIENT)
Dept: SURGERY | Facility: CLINIC | Age: 75
End: 2025-07-29
Payer: MEDICAID

## 2025-07-29 PROCEDURE — 99024 POSTOP FOLLOW-UP VISIT: CPT

## 2025-08-10 ENCOUNTER — INPATIENT (INPATIENT)
Facility: HOSPITAL | Age: 75
LOS: 25 days | Discharge: HOME | DRG: 424 | End: 2025-09-05
Attending: HOSPITALIST | Admitting: INTERNAL MEDICINE
Payer: MEDICAID

## 2025-08-10 VITALS
OXYGEN SATURATION: 98 % | TEMPERATURE: 98 F | RESPIRATION RATE: 22 BRPM | SYSTOLIC BLOOD PRESSURE: 73 MMHG | HEART RATE: 81 BPM | DIASTOLIC BLOOD PRESSURE: 51 MMHG

## 2025-08-10 DIAGNOSIS — E83.52 HYPERCALCEMIA: ICD-10-CM

## 2025-08-10 LAB
24R-OH-CALCIDIOL SERPL-MCNC: 40 NG/ML — SIGNIFICANT CHANGE UP
24R-OH-CALCIDIOL SERPL-MCNC: 42 NG/ML — SIGNIFICANT CHANGE UP
ALBUMIN SERPL ELPH-MCNC: 2.5 G/DL — LOW (ref 3.5–5.2)
ALBUMIN SERPL ELPH-MCNC: 2.8 G/DL — LOW (ref 3.5–5.2)
ALP SERPL-CCNC: 100 U/L — SIGNIFICANT CHANGE UP (ref 30–115)
ALP SERPL-CCNC: 102 U/L — SIGNIFICANT CHANGE UP (ref 30–115)
ALT FLD-CCNC: 8 U/L — SIGNIFICANT CHANGE UP (ref 0–41)
ALT FLD-CCNC: 8 U/L — SIGNIFICANT CHANGE UP (ref 0–41)
ANION GAP SERPL CALC-SCNC: 10 MMOL/L — SIGNIFICANT CHANGE UP (ref 7–14)
ANION GAP SERPL CALC-SCNC: 8 MMOL/L — SIGNIFICANT CHANGE UP (ref 7–14)
ANION GAP SERPL CALC-SCNC: 8 MMOL/L — SIGNIFICANT CHANGE UP (ref 7–14)
APPEARANCE UR: ABNORMAL
AST SERPL-CCNC: 13 U/L — SIGNIFICANT CHANGE UP (ref 0–41)
AST SERPL-CCNC: 14 U/L — SIGNIFICANT CHANGE UP (ref 0–41)
BASOPHILS # BLD AUTO: 0.03 K/UL — SIGNIFICANT CHANGE UP (ref 0–0.2)
BASOPHILS NFR BLD AUTO: 0.2 % — SIGNIFICANT CHANGE UP (ref 0–1)
BILIRUB SERPL-MCNC: 0.3 MG/DL — SIGNIFICANT CHANGE UP (ref 0.2–1.2)
BILIRUB SERPL-MCNC: 0.3 MG/DL — SIGNIFICANT CHANGE UP (ref 0.2–1.2)
BILIRUB UR-MCNC: NEGATIVE — SIGNIFICANT CHANGE UP
BUN SERPL-MCNC: 41 MG/DL — HIGH (ref 10–20)
BUN SERPL-MCNC: 45 MG/DL — HIGH (ref 10–20)
BUN SERPL-MCNC: 45 MG/DL — HIGH (ref 10–20)
CALCIUM SERPL-MCNC: 14.9 MG/DL — CRITICAL HIGH (ref 8.4–10.5)
CALCIUM SERPL-MCNC: 15.2 MG/DL — CRITICAL HIGH (ref 8.4–10.5)
CALCIUM SERPL-MCNC: 16.2 MG/DL — CRITICAL HIGH (ref 8.4–10.5)
CHLORIDE SERPL-SCNC: 103 MMOL/L — SIGNIFICANT CHANGE UP (ref 98–110)
CHLORIDE SERPL-SCNC: 97 MMOL/L — LOW (ref 98–110)
CHLORIDE SERPL-SCNC: 98 MMOL/L — SIGNIFICANT CHANGE UP (ref 98–110)
CO2 SERPL-SCNC: 25 MMOL/L — SIGNIFICANT CHANGE UP (ref 17–32)
CO2 SERPL-SCNC: 26 MMOL/L — SIGNIFICANT CHANGE UP (ref 17–32)
CO2 SERPL-SCNC: 30 MMOL/L — SIGNIFICANT CHANGE UP (ref 17–32)
COLOR SPEC: YELLOW — SIGNIFICANT CHANGE UP
CREAT ?TM UR-MCNC: 19 MG/DL — SIGNIFICANT CHANGE UP
CREAT SERPL-MCNC: 0.7 MG/DL — SIGNIFICANT CHANGE UP (ref 0.7–1.5)
CREAT SERPL-MCNC: 0.8 MG/DL — SIGNIFICANT CHANGE UP (ref 0.7–1.5)
CREAT SERPL-MCNC: 0.9 MG/DL — SIGNIFICANT CHANGE UP (ref 0.7–1.5)
DIFF PNL FLD: NEGATIVE — SIGNIFICANT CHANGE UP
EGFR: 68 ML/MIN/1.73M2 — SIGNIFICANT CHANGE UP
EGFR: 68 ML/MIN/1.73M2 — SIGNIFICANT CHANGE UP
EGFR: 78 ML/MIN/1.73M2 — SIGNIFICANT CHANGE UP
EGFR: 78 ML/MIN/1.73M2 — SIGNIFICANT CHANGE UP
EGFR: 92 ML/MIN/1.73M2 — SIGNIFICANT CHANGE UP
EGFR: 92 ML/MIN/1.73M2 — SIGNIFICANT CHANGE UP
EOSINOPHIL # BLD AUTO: 0.06 K/UL — SIGNIFICANT CHANGE UP (ref 0–0.7)
EOSINOPHIL NFR BLD AUTO: 0.4 % — SIGNIFICANT CHANGE UP (ref 0–8)
GLUCOSE BLDC GLUCOMTR-MCNC: 121 MG/DL — HIGH (ref 70–99)
GLUCOSE BLDC GLUCOMTR-MCNC: 139 MG/DL — HIGH (ref 70–99)
GLUCOSE SERPL-MCNC: 104 MG/DL — HIGH (ref 70–99)
GLUCOSE SERPL-MCNC: 106 MG/DL — HIGH (ref 70–99)
GLUCOSE SERPL-MCNC: 113 MG/DL — HIGH (ref 70–99)
GLUCOSE UR QL: NEGATIVE MG/DL — SIGNIFICANT CHANGE UP
HCT VFR BLD CALC: 29.1 % — LOW (ref 37–47)
HGB BLD-MCNC: 9.5 G/DL — LOW (ref 12–16)
IMM GRANULOCYTES NFR BLD AUTO: 0.5 % — HIGH (ref 0.1–0.3)
KETONES UR QL: NEGATIVE MG/DL — SIGNIFICANT CHANGE UP
LACTATE SERPL-SCNC: 1.3 MMOL/L — SIGNIFICANT CHANGE UP (ref 0.7–2)
LEUKOCYTE ESTERASE UR-ACNC: ABNORMAL
LYMPHOCYTES # BLD AUTO: 1.83 K/UL — SIGNIFICANT CHANGE UP (ref 1.2–3.4)
LYMPHOCYTES # BLD AUTO: 11.5 % — LOW (ref 20.5–51.1)
MAGNESIUM SERPL-MCNC: 1.1 MG/DL — LOW (ref 1.8–2.4)
MCHC RBC-ENTMCNC: 28.1 PG — SIGNIFICANT CHANGE UP (ref 27–31)
MCHC RBC-ENTMCNC: 32.6 G/DL — SIGNIFICANT CHANGE UP (ref 32–37)
MCV RBC AUTO: 86.1 FL — SIGNIFICANT CHANGE UP (ref 81–99)
MONOCYTES # BLD AUTO: 0.92 K/UL — HIGH (ref 0.1–0.6)
MONOCYTES NFR BLD AUTO: 5.8 % — SIGNIFICANT CHANGE UP (ref 1.7–9.3)
NEUTROPHILS # BLD AUTO: 13.02 K/UL — HIGH (ref 1.4–6.5)
NEUTROPHILS NFR BLD AUTO: 81.6 % — HIGH (ref 42.2–75.2)
NITRITE UR-MCNC: NEGATIVE — SIGNIFICANT CHANGE UP
NRBC BLD AUTO-RTO: 0 /100 WBCS — SIGNIFICANT CHANGE UP (ref 0–0)
OSMOLALITY UR: 255 MOS/KG — SIGNIFICANT CHANGE UP (ref 50–1200)
PH UR: 6 — SIGNIFICANT CHANGE UP (ref 5–8)
PHOSPHATE SERPL-MCNC: 1.3 MG/DL — LOW (ref 2.1–4.9)
PLATELET # BLD AUTO: 272 K/UL — SIGNIFICANT CHANGE UP (ref 130–400)
PMV BLD: 10.8 FL — HIGH (ref 7.4–10.4)
POTASSIUM SERPL-MCNC: 4 MMOL/L — SIGNIFICANT CHANGE UP (ref 3.5–5)
POTASSIUM SERPL-MCNC: 4.5 MMOL/L — SIGNIFICANT CHANGE UP (ref 3.5–5)
POTASSIUM SERPL-MCNC: 4.6 MMOL/L — SIGNIFICANT CHANGE UP (ref 3.5–5)
POTASSIUM SERPL-SCNC: 4 MMOL/L — SIGNIFICANT CHANGE UP (ref 3.5–5)
POTASSIUM SERPL-SCNC: 4.5 MMOL/L — SIGNIFICANT CHANGE UP (ref 3.5–5)
POTASSIUM SERPL-SCNC: 4.6 MMOL/L — SIGNIFICANT CHANGE UP (ref 3.5–5)
POTASSIUM UR-SCNC: 17 MMOL/L — SIGNIFICANT CHANGE UP
PROT ?TM UR-MCNC: 29 MG/DL — SIGNIFICANT CHANGE UP
PROT SERPL-MCNC: 5 G/DL — LOW (ref 6–8)
PROT SERPL-MCNC: 5.3 G/DL — LOW (ref 6–8)
PROT UR-MCNC: SIGNIFICANT CHANGE UP MG/DL
PROT/CREAT UR-RTO: 1.5 RATIO — HIGH (ref 0–0.2)
RBC # BLD: 3.27 M/UL — LOW (ref 4.2–5.4)
RBC # BLD: 3.38 M/UL — LOW (ref 4.2–5.4)
RBC # FLD: 13.5 % — SIGNIFICANT CHANGE UP (ref 11.5–14.5)
RETICS #: 38.6 K/UL — SIGNIFICANT CHANGE UP (ref 25–125)
RETICS/RBC NFR: 1.2 % — SIGNIFICANT CHANGE UP (ref 0.5–1.5)
SODIUM SERPL-SCNC: 132 MMOL/L — LOW (ref 135–146)
SODIUM SERPL-SCNC: 135 MMOL/L — SIGNIFICANT CHANGE UP (ref 135–146)
SODIUM SERPL-SCNC: 138 MMOL/L — SIGNIFICANT CHANGE UP (ref 135–146)
SODIUM UR-SCNC: 26 MMOL/L — SIGNIFICANT CHANGE UP
SP GR SPEC: 1.01 — SIGNIFICANT CHANGE UP (ref 1–1.03)
UROBILINOGEN FLD QL: 0.2 MG/DL — SIGNIFICANT CHANGE UP (ref 0.2–1)
UUN UR-MCNC: 390 MG/DL — SIGNIFICANT CHANGE UP
WBC # BLD: 15.94 K/UL — HIGH (ref 4.8–10.8)
WBC # FLD AUTO: 15.94 K/UL — HIGH (ref 4.8–10.8)

## 2025-08-10 PROCEDURE — 84155 ASSAY OF PROTEIN SERUM: CPT

## 2025-08-10 PROCEDURE — 83036 HEMOGLOBIN GLYCOSYLATED A1C: CPT

## 2025-08-10 PROCEDURE — 87077 CULTURE AEROBIC IDENTIFY: CPT

## 2025-08-10 PROCEDURE — 82607 VITAMIN B-12: CPT

## 2025-08-10 PROCEDURE — L8699: CPT

## 2025-08-10 PROCEDURE — 80053 COMPREHEN METABOLIC PANEL: CPT

## 2025-08-10 PROCEDURE — 87493 C DIFF AMPLIFIED PROBE: CPT

## 2025-08-10 PROCEDURE — 86901 BLOOD TYPING SEROLOGIC RH(D): CPT

## 2025-08-10 PROCEDURE — 99291 CRITICAL CARE FIRST HOUR: CPT

## 2025-08-10 PROCEDURE — 83519 RIA NONANTIBODY: CPT

## 2025-08-10 PROCEDURE — 87324 CLOSTRIDIUM AG IA: CPT

## 2025-08-10 PROCEDURE — 76770 US EXAM ABDO BACK WALL COMP: CPT

## 2025-08-10 PROCEDURE — 94640 AIRWAY INHALATION TREATMENT: CPT

## 2025-08-10 PROCEDURE — 83970 ASSAY OF PARATHORMONE: CPT

## 2025-08-10 PROCEDURE — 84436 ASSAY OF TOTAL THYROXINE: CPT

## 2025-08-10 PROCEDURE — 84295 ASSAY OF SERUM SODIUM: CPT

## 2025-08-10 PROCEDURE — 85025 COMPLETE CBC W/AUTO DIFF WBC: CPT

## 2025-08-10 PROCEDURE — 82728 ASSAY OF FERRITIN: CPT

## 2025-08-10 PROCEDURE — 87449 NOS EACH ORGANISM AG IA: CPT

## 2025-08-10 PROCEDURE — 74019 RADEX ABDOMEN 2 VIEWS: CPT

## 2025-08-10 PROCEDURE — 82652 VIT D 1 25-DIHYDROXY: CPT

## 2025-08-10 PROCEDURE — 93005 ELECTROCARDIOGRAM TRACING: CPT

## 2025-08-10 PROCEDURE — 84100 ASSAY OF PHOSPHORUS: CPT

## 2025-08-10 PROCEDURE — 86922 COMPATIBILITY TEST ANTIGLOB: CPT

## 2025-08-10 PROCEDURE — 82962 GLUCOSE BLOOD TEST: CPT

## 2025-08-10 PROCEDURE — 82803 BLOOD GASES ANY COMBINATION: CPT

## 2025-08-10 PROCEDURE — 84540 ASSAY OF URINE/UREA-N: CPT

## 2025-08-10 PROCEDURE — 85045 AUTOMATED RETICULOCYTE COUNT: CPT

## 2025-08-10 PROCEDURE — 36415 COLL VENOUS BLD VENIPUNCTURE: CPT

## 2025-08-10 PROCEDURE — 80061 LIPID PANEL: CPT

## 2025-08-10 PROCEDURE — 82330 ASSAY OF CALCIUM: CPT

## 2025-08-10 PROCEDURE — 85018 HEMOGLOBIN: CPT

## 2025-08-10 PROCEDURE — 83540 ASSAY OF IRON: CPT

## 2025-08-10 PROCEDURE — 84300 ASSAY OF URINE SODIUM: CPT

## 2025-08-10 PROCEDURE — 84156 ASSAY OF PROTEIN URINE: CPT

## 2025-08-10 PROCEDURE — 86870 RBC ANTIBODY IDENTIFICATION: CPT

## 2025-08-10 PROCEDURE — 93306 TTE W/DOPPLER COMPLETE: CPT

## 2025-08-10 PROCEDURE — 84443 ASSAY THYROID STIM HORMONE: CPT

## 2025-08-10 PROCEDURE — 83521 IG LIGHT CHAINS FREE EACH: CPT

## 2025-08-10 PROCEDURE — 87507 IADNA-DNA/RNA PROBE TQ 12-25: CPT

## 2025-08-10 PROCEDURE — 86850 RBC ANTIBODY SCREEN: CPT

## 2025-08-10 PROCEDURE — 93971 EXTREMITY STUDY: CPT | Mod: LT

## 2025-08-10 PROCEDURE — 85014 HEMATOCRIT: CPT

## 2025-08-10 PROCEDURE — 87186 SC STD MICRODIL/AGAR DIL: CPT

## 2025-08-10 PROCEDURE — 83935 ASSAY OF URINE OSMOLALITY: CPT

## 2025-08-10 PROCEDURE — 86334 IMMUNOFIX E-PHORESIS SERUM: CPT

## 2025-08-10 PROCEDURE — 85027 COMPLETE CBC AUTOMATED: CPT

## 2025-08-10 PROCEDURE — 83735 ASSAY OF MAGNESIUM: CPT

## 2025-08-10 PROCEDURE — 87640 STAPH A DNA AMP PROBE: CPT

## 2025-08-10 PROCEDURE — 81001 URINALYSIS AUTO W/SCOPE: CPT

## 2025-08-10 PROCEDURE — 83550 IRON BINDING TEST: CPT

## 2025-08-10 PROCEDURE — 80048 BASIC METABOLIC PNL TOTAL CA: CPT

## 2025-08-10 PROCEDURE — 82746 ASSAY OF FOLIC ACID SERUM: CPT

## 2025-08-10 PROCEDURE — 87040 BLOOD CULTURE FOR BACTERIA: CPT

## 2025-08-10 PROCEDURE — 71045 X-RAY EXAM CHEST 1 VIEW: CPT

## 2025-08-10 PROCEDURE — 93010 ELECTROCARDIOGRAM REPORT: CPT

## 2025-08-10 PROCEDURE — 82310 ASSAY OF CALCIUM: CPT

## 2025-08-10 PROCEDURE — C1889: CPT

## 2025-08-10 PROCEDURE — 71045 X-RAY EXAM CHEST 1 VIEW: CPT | Mod: 26

## 2025-08-10 PROCEDURE — 86900 BLOOD TYPING SEROLOGIC ABO: CPT

## 2025-08-10 PROCEDURE — 84165 PROTEIN E-PHORESIS SERUM: CPT

## 2025-08-10 PROCEDURE — 84133 ASSAY OF URINE POTASSIUM: CPT

## 2025-08-10 PROCEDURE — 86880 COOMBS TEST DIRECT: CPT

## 2025-08-10 PROCEDURE — P9016: CPT

## 2025-08-10 PROCEDURE — 86335 IMMUNFIX E-PHORSIS/URINE/CSF: CPT

## 2025-08-10 PROCEDURE — 85610 PROTHROMBIN TIME: CPT

## 2025-08-10 PROCEDURE — 83605 ASSAY OF LACTIC ACID: CPT

## 2025-08-10 PROCEDURE — 82306 VITAMIN D 25 HYDROXY: CPT

## 2025-08-10 PROCEDURE — 0225U NFCT DS DNA&RNA 21 SARSCOV2: CPT

## 2025-08-10 PROCEDURE — 84480 ASSAY TRIIODOTHYRONINE (T3): CPT

## 2025-08-10 PROCEDURE — 87641 MR-STAPH DNA AMP PROBE: CPT

## 2025-08-10 PROCEDURE — 36430 TRANSFUSION BLD/BLD COMPNT: CPT

## 2025-08-10 PROCEDURE — 93308 TTE F-UP OR LMTD: CPT

## 2025-08-10 PROCEDURE — 85730 THROMBOPLASTIN TIME PARTIAL: CPT

## 2025-08-10 PROCEDURE — 84132 ASSAY OF SERUM POTASSIUM: CPT

## 2025-08-10 PROCEDURE — 82570 ASSAY OF URINE CREATININE: CPT

## 2025-08-10 PROCEDURE — 82340 ASSAY OF CALCIUM IN URINE: CPT

## 2025-08-10 PROCEDURE — 87086 URINE CULTURE/COLONY COUNT: CPT

## 2025-08-10 RX ORDER — ALBUTEROL SULFATE 2.5 MG/3ML
2 VIAL, NEBULIZER (ML) INHALATION
Refills: 0 | DISCHARGE

## 2025-08-10 RX ORDER — LOSARTAN POTASSIUM 100 MG/1
1 TABLET, FILM COATED ORAL
Refills: 0 | DISCHARGE

## 2025-08-10 RX ORDER — ALBUTEROL SULFATE 2.5 MG/3ML
2 VIAL, NEBULIZER (ML) INHALATION EVERY 6 HOURS
Refills: 0 | Status: DISCONTINUED | OUTPATIENT
Start: 2025-08-10 | End: 2025-09-05

## 2025-08-10 RX ORDER — ONDANSETRON HCL/PF 4 MG/2 ML
4 VIAL (ML) INJECTION EVERY 8 HOURS
Refills: 0 | Status: DISCONTINUED | OUTPATIENT
Start: 2025-08-10 | End: 2025-09-05

## 2025-08-10 RX ORDER — CEFTRIAXONE 500 MG/1
1000 INJECTION, POWDER, FOR SOLUTION INTRAMUSCULAR; INTRAVENOUS EVERY 24 HOURS
Refills: 0 | Status: DISCONTINUED | OUTPATIENT
Start: 2025-08-11 | End: 2025-08-12

## 2025-08-10 RX ORDER — IPRATROPIUM BROMIDE AND ALBUTEROL SULFATE .5; 2.5 MG/3ML; MG/3ML
3 SOLUTION RESPIRATORY (INHALATION) EVERY 6 HOURS
Refills: 0 | Status: DISCONTINUED | OUTPATIENT
Start: 2025-08-10 | End: 2025-09-05

## 2025-08-10 RX ORDER — DEXTROSE 50 % IN WATER 50 %
15 SYRINGE (ML) INTRAVENOUS ONCE
Refills: 0 | Status: DISCONTINUED | OUTPATIENT
Start: 2025-08-10 | End: 2025-09-05

## 2025-08-10 RX ORDER — MAGNESIUM SULFATE 500 MG/ML
2 SYRINGE (ML) INJECTION
Refills: 0 | Status: COMPLETED | OUTPATIENT
Start: 2025-08-10 | End: 2025-08-10

## 2025-08-10 RX ORDER — MAGNESIUM SULFATE 500 MG/ML
4 SYRINGE (ML) INJECTION ONCE
Refills: 0 | Status: DISCONTINUED | OUTPATIENT
Start: 2025-08-10 | End: 2025-08-10

## 2025-08-10 RX ORDER — MAGNESIUM OXIDE 400 MG
400 TABLET ORAL
Refills: 0 | Status: DISCONTINUED | OUTPATIENT
Start: 2025-08-10 | End: 2025-09-05

## 2025-08-10 RX ORDER — MAGNESIUM SULFATE 500 MG/ML
2 SYRINGE (ML) INJECTION ONCE
Refills: 0 | Status: COMPLETED | OUTPATIENT
Start: 2025-08-10 | End: 2025-08-10

## 2025-08-10 RX ORDER — FUROSEMIDE 10 MG/ML
40 INJECTION INTRAMUSCULAR; INTRAVENOUS DAILY
Refills: 0 | Status: DISCONTINUED | OUTPATIENT
Start: 2025-08-10 | End: 2025-08-11

## 2025-08-10 RX ORDER — CALCITONIN,SALMON,SYNTHETIC 200/SPRAY
280 AEROSOL, SPRAY WITH PUMP (ML) NASAL EVERY 12 HOURS
Refills: 0 | Status: COMPLETED | OUTPATIENT
Start: 2025-08-10 | End: 2025-08-11

## 2025-08-10 RX ORDER — CINACALCET 30 MG/1
30 TABLET, FILM COATED ORAL
Refills: 0 | Status: DISCONTINUED | OUTPATIENT
Start: 2025-08-10 | End: 2025-08-11

## 2025-08-10 RX ORDER — SODIUM CHLORIDE 9 G/1000ML
1000 INJECTION, SOLUTION INTRAVENOUS
Refills: 0 | Status: DISCONTINUED | OUTPATIENT
Start: 2025-08-10 | End: 2025-09-05

## 2025-08-10 RX ORDER — GLUCAGON 3 MG/1
1 POWDER NASAL ONCE
Refills: 0 | Status: DISCONTINUED | OUTPATIENT
Start: 2025-08-10 | End: 2025-09-05

## 2025-08-10 RX ORDER — OMEPRAZOLE 20 MG/1
1 CAPSULE, DELAYED RELEASE ORAL
Refills: 0 | DISCHARGE

## 2025-08-10 RX ORDER — ACETAMINOPHEN 500 MG/5ML
350 LIQUID (ML) ORAL
Refills: 0 | DISCHARGE

## 2025-08-10 RX ORDER — FUROSEMIDE 10 MG/ML
1 INJECTION INTRAMUSCULAR; INTRAVENOUS
Refills: 0 | DISCHARGE

## 2025-08-10 RX ORDER — ENOXAPARIN SODIUM 100 MG/ML
70 INJECTION SUBCUTANEOUS EVERY 12 HOURS
Refills: 0 | Status: DISCONTINUED | OUTPATIENT
Start: 2025-08-10 | End: 2025-08-23

## 2025-08-10 RX ORDER — INSULIN LISPRO 100 U/ML
INJECTION, SOLUTION INTRAVENOUS; SUBCUTANEOUS
Refills: 0 | Status: DISCONTINUED | OUTPATIENT
Start: 2025-08-10 | End: 2025-09-05

## 2025-08-10 RX ORDER — TOUCHLESS CARE ZINC OXIDE PROTECTANT 20; 25 G/100G; G/100G
1 SPRAY TOPICAL
Refills: 0 | Status: DISCONTINUED | OUTPATIENT
Start: 2025-08-10 | End: 2025-09-05

## 2025-08-10 RX ORDER — CEFTRIAXONE 500 MG/1
INJECTION, POWDER, FOR SOLUTION INTRAMUSCULAR; INTRAVENOUS
Refills: 0 | Status: DISCONTINUED | OUTPATIENT
Start: 2025-08-10 | End: 2025-08-12

## 2025-08-10 RX ORDER — ACETAMINOPHEN 500 MG/5ML
650 LIQUID (ML) ORAL EVERY 6 HOURS
Refills: 0 | Status: DISCONTINUED | OUTPATIENT
Start: 2025-08-10 | End: 2025-09-05

## 2025-08-10 RX ORDER — DEXTROSE 50 % IN WATER 50 %
25 SYRINGE (ML) INTRAVENOUS ONCE
Refills: 0 | Status: DISCONTINUED | OUTPATIENT
Start: 2025-08-10 | End: 2025-09-05

## 2025-08-10 RX ORDER — RIVAROXABAN 10 MG/1
1 TABLET, FILM COATED ORAL
Refills: 0 | DISCHARGE

## 2025-08-10 RX ORDER — SODIUM CHLORIDE 9 G/1000ML
1000 INJECTION, SOLUTION INTRAVENOUS
Refills: 0 | Status: DISCONTINUED | OUTPATIENT
Start: 2025-08-10 | End: 2025-08-10

## 2025-08-10 RX ORDER — DEXTROSE 50 % IN WATER 50 %
12.5 SYRINGE (ML) INTRAVENOUS ONCE
Refills: 0 | Status: DISCONTINUED | OUTPATIENT
Start: 2025-08-10 | End: 2025-09-05

## 2025-08-10 RX ORDER — MAGNESIUM OXIDE 400 MG
500 TABLET ORAL
Refills: 0 | DISCHARGE

## 2025-08-10 RX ORDER — CEFTRIAXONE 500 MG/1
1000 INJECTION, POWDER, FOR SOLUTION INTRAMUSCULAR; INTRAVENOUS ONCE
Refills: 0 | Status: COMPLETED | OUTPATIENT
Start: 2025-08-10 | End: 2025-08-10

## 2025-08-10 RX ORDER — MAGNESIUM SULFATE 500 MG/ML
2 SYRINGE (ML) INJECTION
Refills: 0 | Status: DISCONTINUED | OUTPATIENT
Start: 2025-08-10 | End: 2025-08-10

## 2025-08-10 RX ORDER — SILVER SULFADIAZINE 1 %
1 CREAM (GRAM) TOPICAL
Refills: 0 | Status: DISCONTINUED | OUTPATIENT
Start: 2025-08-10 | End: 2025-09-05

## 2025-08-10 RX ORDER — B1/B2/B3/B5/B6/B12/VIT C/FOLIC 500-0.5 MG
1 TABLET ORAL
Refills: 0 | DISCHARGE

## 2025-08-10 RX ORDER — MAGNESIUM, ALUMINUM HYDROXIDE 200-200 MG
30 TABLET,CHEWABLE ORAL EVERY 4 HOURS
Refills: 0 | Status: DISCONTINUED | OUTPATIENT
Start: 2025-08-10 | End: 2025-09-05

## 2025-08-10 RX ORDER — MELATONIN 5 MG
3 TABLET ORAL AT BEDTIME
Refills: 0 | Status: DISCONTINUED | OUTPATIENT
Start: 2025-08-10 | End: 2025-09-05

## 2025-08-10 RX ORDER — CALCIUM CARBONATE/VITAMIN D3 500MG-5MCG
0 TABLET ORAL
Refills: 0 | DISCHARGE

## 2025-08-10 RX ADMIN — Medication 1 APPLICATION(S): at 20:20

## 2025-08-10 RX ADMIN — FUROSEMIDE 40 MILLIGRAM(S): 10 INJECTION INTRAMUSCULAR; INTRAVENOUS at 21:13

## 2025-08-10 RX ADMIN — Medication 25 GRAM(S): at 19:37

## 2025-08-10 RX ADMIN — Medication 1000 MILLILITER(S): at 15:18

## 2025-08-10 RX ADMIN — Medication 25 GRAM(S): at 16:05

## 2025-08-10 RX ADMIN — Medication 2 GRAM(S): at 17:40

## 2025-08-10 RX ADMIN — Medication 2000 MILLILITER(S): at 16:25

## 2025-08-10 RX ADMIN — ENOXAPARIN SODIUM 70 MILLIGRAM(S): 100 INJECTION SUBCUTANEOUS at 21:35

## 2025-08-10 RX ADMIN — Medication 25 GRAM(S): at 21:35

## 2025-08-10 RX ADMIN — CINACALCET 30 MILLIGRAM(S): 30 TABLET, FILM COATED ORAL at 21:14

## 2025-08-10 RX ADMIN — SODIUM CHLORIDE 250 MILLILITER(S): 9 INJECTION, SOLUTION INTRAVENOUS at 19:01

## 2025-08-10 RX ADMIN — Medication 1000 MILLILITER(S): at 16:00

## 2025-08-10 RX ADMIN — Medication 250 MILLILITER(S): at 19:38

## 2025-08-10 RX ADMIN — TOUCHLESS CARE ZINC OXIDE PROTECTANT 1 APPLICATION(S): 20; 25 SPRAY TOPICAL at 20:20

## 2025-08-10 RX ADMIN — CEFTRIAXONE 100 MILLIGRAM(S): 500 INJECTION, POWDER, FOR SOLUTION INTRAMUSCULAR; INTRAVENOUS at 20:20

## 2025-08-10 RX ADMIN — Medication 280 INTERNATIONAL UNIT(S): at 17:11

## 2025-08-10 RX ADMIN — IPRATROPIUM BROMIDE AND ALBUTEROL SULFATE 3 MILLILITER(S): .5; 2.5 SOLUTION RESPIRATORY (INHALATION) at 20:05

## 2025-08-10 RX ADMIN — Medication 25 GRAM(S): at 17:40

## 2025-08-10 RX ADMIN — Medication 400 MILLIGRAM(S): at 21:13

## 2025-08-11 LAB
A1C WITH ESTIMATED AVERAGE GLUCOSE RESULT: 5.2 % — SIGNIFICANT CHANGE UP (ref 4–5.6)
ALBUMIN SERPL ELPH-MCNC: 1.9 G/DL — LOW (ref 3.5–5.2)
ALBUMIN SERPL ELPH-MCNC: 2.1 G/DL — LOW (ref 3.5–5.2)
ALBUMIN SERPL ELPH-MCNC: 2.4 G/DL — LOW (ref 3.5–5.2)
ALP SERPL-CCNC: 74 U/L — SIGNIFICANT CHANGE UP (ref 30–115)
ALP SERPL-CCNC: 95 U/L — SIGNIFICANT CHANGE UP (ref 30–115)
ALP SERPL-CCNC: 97 U/L — SIGNIFICANT CHANGE UP (ref 30–115)
ALT FLD-CCNC: 6 U/L — SIGNIFICANT CHANGE UP (ref 0–41)
ALT FLD-CCNC: 7 U/L — SIGNIFICANT CHANGE UP (ref 0–41)
ALT FLD-CCNC: 8 U/L — SIGNIFICANT CHANGE UP (ref 0–41)
ANION GAP SERPL CALC-SCNC: 11 MMOL/L — SIGNIFICANT CHANGE UP (ref 7–14)
ANION GAP SERPL CALC-SCNC: 11 MMOL/L — SIGNIFICANT CHANGE UP (ref 7–14)
ANION GAP SERPL CALC-SCNC: 9 MMOL/L — SIGNIFICANT CHANGE UP (ref 7–14)
APTT BLD: 35.2 SEC — SIGNIFICANT CHANGE UP (ref 27–39.2)
AST SERPL-CCNC: 13 U/L — SIGNIFICANT CHANGE UP (ref 0–41)
AST SERPL-CCNC: 14 U/L — SIGNIFICANT CHANGE UP (ref 0–41)
AST SERPL-CCNC: 20 U/L — SIGNIFICANT CHANGE UP (ref 0–41)
BASOPHILS # BLD AUTO: 0.02 K/UL — SIGNIFICANT CHANGE UP (ref 0–0.2)
BASOPHILS # BLD AUTO: 0.03 K/UL — SIGNIFICANT CHANGE UP (ref 0–0.2)
BASOPHILS NFR BLD AUTO: 0.1 % — SIGNIFICANT CHANGE UP (ref 0–1)
BASOPHILS NFR BLD AUTO: 0.1 % — SIGNIFICANT CHANGE UP (ref 0–1)
BILIRUB SERPL-MCNC: 0.2 MG/DL — SIGNIFICANT CHANGE UP (ref 0.2–1.2)
BILIRUB SERPL-MCNC: 0.3 MG/DL — SIGNIFICANT CHANGE UP (ref 0.2–1.2)
BILIRUB SERPL-MCNC: <0.2 MG/DL — SIGNIFICANT CHANGE UP (ref 0.2–1.2)
BUN SERPL-MCNC: 27 MG/DL — HIGH (ref 10–20)
BUN SERPL-MCNC: 32 MG/DL — HIGH (ref 10–20)
BUN SERPL-MCNC: 34 MG/DL — HIGH (ref 10–20)
CALCIUM SERPL-MCNC: 10 MG/DL — SIGNIFICANT CHANGE UP (ref 8.4–10.5)
CALCIUM SERPL-MCNC: 12.1 MG/DL — HIGH (ref 8.4–10.5)
CALCIUM SERPL-MCNC: 12.4 MG/DL — HIGH (ref 8.4–10.5)
CALCIUM SERPL-MCNC: 15.6 MG/DL — CRITICAL HIGH (ref 8.4–10.5)
CALCIUM SERPL-MCNC: 15.8 MG/DL — CRITICAL HIGH (ref 8.4–10.5)
CALCIUM UR-MCNC: 15 MG/DL — SIGNIFICANT CHANGE UP
CHLORIDE SERPL-SCNC: 108 MMOL/L — SIGNIFICANT CHANGE UP (ref 98–110)
CHLORIDE SERPL-SCNC: 109 MMOL/L — SIGNIFICANT CHANGE UP (ref 98–110)
CHLORIDE SERPL-SCNC: 115 MMOL/L — HIGH (ref 98–110)
CHOLEST SERPL-MCNC: 76 MG/DL — SIGNIFICANT CHANGE UP
CO2 SERPL-SCNC: 17 MMOL/L — SIGNIFICANT CHANGE UP (ref 17–32)
CO2 SERPL-SCNC: 20 MMOL/L — SIGNIFICANT CHANGE UP (ref 17–32)
CO2 SERPL-SCNC: 24 MMOL/L — SIGNIFICANT CHANGE UP (ref 17–32)
CREAT SERPL-MCNC: 0.6 MG/DL — LOW (ref 0.7–1.5)
CREAT SERPL-MCNC: 0.7 MG/DL — SIGNIFICANT CHANGE UP (ref 0.7–1.5)
CREAT SERPL-MCNC: <0.5 MG/DL — LOW (ref 0.7–1.5)
EGFR: 105 ML/MIN/1.73M2 — SIGNIFICANT CHANGE UP
EGFR: 105 ML/MIN/1.73M2 — SIGNIFICANT CHANGE UP
EGFR: 92 ML/MIN/1.73M2 — SIGNIFICANT CHANGE UP
EGFR: 92 ML/MIN/1.73M2 — SIGNIFICANT CHANGE UP
EGFR: 95 ML/MIN/1.73M2 — SIGNIFICANT CHANGE UP
EGFR: 95 ML/MIN/1.73M2 — SIGNIFICANT CHANGE UP
EOSINOPHIL # BLD AUTO: 0 K/UL — SIGNIFICANT CHANGE UP (ref 0–0.7)
EOSINOPHIL # BLD AUTO: 0.01 K/UL — SIGNIFICANT CHANGE UP (ref 0–0.7)
EOSINOPHIL NFR BLD AUTO: 0 % — SIGNIFICANT CHANGE UP (ref 0–8)
EOSINOPHIL NFR BLD AUTO: 0.1 % — SIGNIFICANT CHANGE UP (ref 0–8)
ESTIMATED AVERAGE GLUCOSE: 103 MG/DL — SIGNIFICANT CHANGE UP (ref 68–114)
FERRITIN SERPL-MCNC: 99 NG/ML — SIGNIFICANT CHANGE UP (ref 13–330)
FOLATE SERPL-MCNC: 7.6 NG/ML — SIGNIFICANT CHANGE UP
GLUCOSE BLDC GLUCOMTR-MCNC: 101 MG/DL — HIGH (ref 70–99)
GLUCOSE BLDC GLUCOMTR-MCNC: 105 MG/DL — HIGH (ref 70–99)
GLUCOSE BLDC GLUCOMTR-MCNC: 97 MG/DL — SIGNIFICANT CHANGE UP (ref 70–99)
GLUCOSE SERPL-MCNC: 66 MG/DL — LOW (ref 70–99)
GLUCOSE SERPL-MCNC: 88 MG/DL — SIGNIFICANT CHANGE UP (ref 70–99)
GLUCOSE SERPL-MCNC: 91 MG/DL — SIGNIFICANT CHANGE UP (ref 70–99)
HCT VFR BLD CALC: 25.5 % — LOW (ref 37–47)
HCT VFR BLD CALC: 30.8 % — LOW (ref 37–47)
HDLC SERPL-MCNC: 30 MG/DL — LOW
HGB BLD-MCNC: 10 G/DL — LOW (ref 12–16)
HGB BLD-MCNC: 8 G/DL — LOW (ref 12–16)
IMM GRANULOCYTES NFR BLD AUTO: 0.6 % — HIGH (ref 0.1–0.3)
IMM GRANULOCYTES NFR BLD AUTO: 0.7 % — HIGH (ref 0.1–0.3)
INR BLD: 1.05 RATIO — SIGNIFICANT CHANGE UP (ref 0.65–1.3)
IRON SATN MFR SERPL: 16 % — SIGNIFICANT CHANGE UP (ref 15–50)
IRON SATN MFR SERPL: 20 UG/DL — LOW (ref 35–150)
LACTATE SERPL-SCNC: 1.7 MMOL/L — SIGNIFICANT CHANGE UP (ref 0.7–2)
LDLC SERPL-MCNC: 33 MG/DL — SIGNIFICANT CHANGE UP
LIPID PNL WITH DIRECT LDL SERPL: 33 MG/DL — SIGNIFICANT CHANGE UP
LYMPHOCYTES # BLD AUTO: 0.78 K/UL — LOW (ref 1.2–3.4)
LYMPHOCYTES # BLD AUTO: 0.78 K/UL — LOW (ref 1.2–3.4)
LYMPHOCYTES # BLD AUTO: 3.9 % — LOW (ref 20.5–51.1)
LYMPHOCYTES # BLD AUTO: 5.2 % — LOW (ref 20.5–51.1)
MAGNESIUM SERPL-MCNC: 1.7 MG/DL — LOW (ref 1.8–2.4)
MCHC RBC-ENTMCNC: 28.3 PG — SIGNIFICANT CHANGE UP (ref 27–31)
MCHC RBC-ENTMCNC: 28.4 PG — SIGNIFICANT CHANGE UP (ref 27–31)
MCHC RBC-ENTMCNC: 31.4 G/DL — LOW (ref 32–37)
MCHC RBC-ENTMCNC: 32.5 G/DL — SIGNIFICANT CHANGE UP (ref 32–37)
MCV RBC AUTO: 87.5 FL — SIGNIFICANT CHANGE UP (ref 81–99)
MCV RBC AUTO: 90.1 FL — SIGNIFICANT CHANGE UP (ref 81–99)
MONOCYTES # BLD AUTO: 0.68 K/UL — HIGH (ref 0.1–0.6)
MONOCYTES # BLD AUTO: 0.89 K/UL — HIGH (ref 0.1–0.6)
MONOCYTES NFR BLD AUTO: 4.4 % — SIGNIFICANT CHANGE UP (ref 1.7–9.3)
MONOCYTES NFR BLD AUTO: 4.6 % — SIGNIFICANT CHANGE UP (ref 1.7–9.3)
MRSA PCR RESULT.: SIGNIFICANT CHANGE UP
NEUTROPHILS # BLD AUTO: 13.3 K/UL — HIGH (ref 1.4–6.5)
NEUTROPHILS # BLD AUTO: 18.24 K/UL — HIGH (ref 1.4–6.5)
NEUTROPHILS NFR BLD AUTO: 89.4 % — HIGH (ref 42.2–75.2)
NEUTROPHILS NFR BLD AUTO: 90.9 % — HIGH (ref 42.2–75.2)
NONHDLC SERPL-MCNC: 46 MG/DL — SIGNIFICANT CHANGE UP
NRBC BLD AUTO-RTO: 0 /100 WBCS — SIGNIFICANT CHANGE UP (ref 0–0)
NRBC BLD AUTO-RTO: 0 /100 WBCS — SIGNIFICANT CHANGE UP (ref 0–0)
PHOSPHATE SERPL-MCNC: 0.8 MG/DL — LOW (ref 2.1–4.9)
PHOSPHATE SERPL-MCNC: 1.4 MG/DL — LOW (ref 2.1–4.9)
PLATELET # BLD AUTO: 202 K/UL — SIGNIFICANT CHANGE UP (ref 130–400)
PLATELET # BLD AUTO: 292 K/UL — SIGNIFICANT CHANGE UP (ref 130–400)
PMV BLD: 10.9 FL — HIGH (ref 7.4–10.4)
PMV BLD: 11 FL — HIGH (ref 7.4–10.4)
POTASSIUM SERPL-MCNC: 3.2 MMOL/L — LOW (ref 3.5–5)
POTASSIUM SERPL-MCNC: 4.1 MMOL/L — SIGNIFICANT CHANGE UP (ref 3.5–5)
POTASSIUM SERPL-MCNC: 4.3 MMOL/L — SIGNIFICANT CHANGE UP (ref 3.5–5)
POTASSIUM SERPL-SCNC: 3.2 MMOL/L — LOW (ref 3.5–5)
POTASSIUM SERPL-SCNC: 4.1 MMOL/L — SIGNIFICANT CHANGE UP (ref 3.5–5)
POTASSIUM SERPL-SCNC: 4.3 MMOL/L — SIGNIFICANT CHANGE UP (ref 3.5–5)
PROT SERPL-MCNC: 3.6 G/DL — LOW (ref 6–8)
PROT SERPL-MCNC: 4.3 G/DL — LOW (ref 6–8)
PROT SERPL-MCNC: 4.6 G/DL — LOW (ref 6–8)
PROTHROM AB SERPL-ACNC: 12.4 SEC — SIGNIFICANT CHANGE UP (ref 9.95–12.87)
PTH-INTACT FLD-MCNC: 217 PG/ML — HIGH (ref 15–65)
PTH-INTACT FLD-MCNC: 217 PG/ML — HIGH (ref 15–65)
PTH-INTACT FLD-MCNC: 230 PG/ML — HIGH (ref 15–65)
RBC # BLD: 2.83 M/UL — LOW (ref 4.2–5.4)
RBC # BLD: 3.52 M/UL — LOW (ref 4.2–5.4)
RBC # FLD: 13.6 % — SIGNIFICANT CHANGE UP (ref 11.5–14.5)
RBC # FLD: 13.9 % — SIGNIFICANT CHANGE UP (ref 11.5–14.5)
SODIUM SERPL-SCNC: 140 MMOL/L — SIGNIFICANT CHANGE UP (ref 135–146)
SODIUM SERPL-SCNC: 141 MMOL/L — SIGNIFICANT CHANGE UP (ref 135–146)
SODIUM SERPL-SCNC: 143 MMOL/L — SIGNIFICANT CHANGE UP (ref 135–146)
T3 SERPL-MCNC: 92 NG/DL — SIGNIFICANT CHANGE UP (ref 80–200)
T4 AB SER-ACNC: 7.9 UG/DL — SIGNIFICANT CHANGE UP (ref 4.6–12)
TIBC SERPL-MCNC: 125 UG/DL — LOW (ref 220–430)
TRIGL SERPL-MCNC: 51 MG/DL — SIGNIFICANT CHANGE UP
TSH SERPL-MCNC: 0.76 UIU/ML — SIGNIFICANT CHANGE UP (ref 0.27–4.2)
UIBC SERPL-MCNC: 105 UG/DL — LOW (ref 110–370)
VIT B12 SERPL-MCNC: 203 PG/ML — LOW (ref 232–1245)
WBC # BLD: 14.88 K/UL — HIGH (ref 4.8–10.8)
WBC # BLD: 20.08 K/UL — HIGH (ref 4.8–10.8)
WBC # FLD AUTO: 14.88 K/UL — HIGH (ref 4.8–10.8)
WBC # FLD AUTO: 20.08 K/UL — HIGH (ref 4.8–10.8)

## 2025-08-11 PROCEDURE — 99223 1ST HOSP IP/OBS HIGH 75: CPT | Mod: GC

## 2025-08-11 PROCEDURE — 71045 X-RAY EXAM CHEST 1 VIEW: CPT | Mod: 26

## 2025-08-11 PROCEDURE — 76770 US EXAM ABDO BACK WALL COMP: CPT | Mod: 26

## 2025-08-11 PROCEDURE — 93010 ELECTROCARDIOGRAM REPORT: CPT

## 2025-08-11 RX ORDER — CALCITONIN,SALMON,SYNTHETIC 200/SPRAY
270 AEROSOL, SPRAY WITH PUMP (ML) NASAL EVERY 12 HOURS
Refills: 0 | Status: DISCONTINUED | OUTPATIENT
Start: 2025-08-11 | End: 2025-08-11

## 2025-08-11 RX ORDER — SOD PHOS DI, MONO/K PHOS MONO 250 MG
1 TABLET ORAL
Refills: 0 | Status: DISCONTINUED | OUTPATIENT
Start: 2025-08-11 | End: 2025-09-05

## 2025-08-11 RX ORDER — CINACALCET 30 MG/1
30 TABLET, FILM COATED ORAL EVERY 12 HOURS
Refills: 0 | Status: DISCONTINUED | OUTPATIENT
Start: 2025-08-11 | End: 2025-08-11

## 2025-08-11 RX ORDER — MAGNESIUM SULFATE 500 MG/ML
2 SYRINGE (ML) INJECTION ONCE
Refills: 0 | Status: COMPLETED | OUTPATIENT
Start: 2025-08-11 | End: 2025-08-11

## 2025-08-11 RX ORDER — CALCITONIN,SALMON,SYNTHETIC 200/SPRAY
270 AEROSOL, SPRAY WITH PUMP (ML) NASAL EVERY 12 HOURS
Refills: 0 | Status: DISCONTINUED | OUTPATIENT
Start: 2025-08-11 | End: 2025-08-12

## 2025-08-11 RX ADMIN — Medication 1 PACKET(S): at 03:29

## 2025-08-11 RX ADMIN — Medication 400 MILLIGRAM(S): at 08:34

## 2025-08-11 RX ADMIN — Medication 280 INTERNATIONAL UNIT(S): at 05:18

## 2025-08-11 RX ADMIN — FUROSEMIDE 40 MILLIGRAM(S): 10 INJECTION INTRAMUSCULAR; INTRAVENOUS at 05:08

## 2025-08-11 RX ADMIN — Medication 250 MILLILITER(S): at 05:36

## 2025-08-11 RX ADMIN — ENOXAPARIN SODIUM 70 MILLIGRAM(S): 100 INJECTION SUBCUTANEOUS at 17:02

## 2025-08-11 RX ADMIN — Medication 100 MILLILITER(S): at 23:28

## 2025-08-11 RX ADMIN — Medication 250 MILLILITER(S): at 02:54

## 2025-08-11 RX ADMIN — CEFTRIAXONE 100 MILLIGRAM(S): 500 INJECTION, POWDER, FOR SOLUTION INTRAMUSCULAR; INTRAVENOUS at 19:20

## 2025-08-11 RX ADMIN — Medication 50 MILLILITER(S): at 11:43

## 2025-08-11 RX ADMIN — Medication 100 MILLILITER(S): at 15:57

## 2025-08-11 RX ADMIN — Medication 40 MILLIGRAM(S): at 11:42

## 2025-08-11 RX ADMIN — Medication 400 MILLIGRAM(S): at 17:05

## 2025-08-11 RX ADMIN — IPRATROPIUM BROMIDE AND ALBUTEROL SULFATE 3 MILLILITER(S): .5; 2.5 SOLUTION RESPIRATORY (INHALATION) at 02:55

## 2025-08-11 RX ADMIN — Medication 1 PACKET(S): at 23:28

## 2025-08-11 RX ADMIN — Medication 1 PACKET(S): at 17:03

## 2025-08-11 RX ADMIN — IPRATROPIUM BROMIDE AND ALBUTEROL SULFATE 3 MILLILITER(S): .5; 2.5 SOLUTION RESPIRATORY (INHALATION) at 19:53

## 2025-08-11 RX ADMIN — IPRATROPIUM BROMIDE AND ALBUTEROL SULFATE 3 MILLILITER(S): .5; 2.5 SOLUTION RESPIRATORY (INHALATION) at 07:35

## 2025-08-11 RX ADMIN — Medication 40 MILLIGRAM(S): at 05:08

## 2025-08-11 RX ADMIN — Medication 270 INTERNATIONAL UNIT(S): at 17:02

## 2025-08-11 RX ADMIN — Medication 1 PACKET(S): at 11:42

## 2025-08-11 RX ADMIN — IPRATROPIUM BROMIDE AND ALBUTEROL SULFATE 3 MILLILITER(S): .5; 2.5 SOLUTION RESPIRATORY (INHALATION) at 13:29

## 2025-08-11 RX ADMIN — Medication 1 APPLICATION(S): at 05:09

## 2025-08-11 RX ADMIN — Medication 1 APPLICATION(S): at 17:03

## 2025-08-11 RX ADMIN — Medication 25 GRAM(S): at 08:29

## 2025-08-11 RX ADMIN — Medication 1 PACKET(S): at 05:08

## 2025-08-11 RX ADMIN — Medication 1 APPLICATION(S): at 11:42

## 2025-08-11 RX ADMIN — ENOXAPARIN SODIUM 70 MILLIGRAM(S): 100 INJECTION SUBCUTANEOUS at 05:08

## 2025-08-11 RX ADMIN — Medication 500 MILLILITER(S): at 15:57

## 2025-08-11 RX ADMIN — TOUCHLESS CARE ZINC OXIDE PROTECTANT 1 APPLICATION(S): 20; 25 SPRAY TOPICAL at 17:03

## 2025-08-11 RX ADMIN — TOUCHLESS CARE ZINC OXIDE PROTECTANT 1 APPLICATION(S): 20; 25 SPRAY TOPICAL at 05:09

## 2025-08-11 RX ADMIN — Medication 400 MILLIGRAM(S): at 11:43

## 2025-08-12 ENCOUNTER — RESULT REVIEW (OUTPATIENT)
Age: 75
End: 2025-08-12

## 2025-08-12 LAB
ALBUMIN SERPL ELPH-MCNC: 2.2 G/DL — LOW (ref 3.5–5.2)
ALP SERPL-CCNC: 104 U/L — SIGNIFICANT CHANGE UP (ref 30–115)
ALT FLD-CCNC: 11 U/L — SIGNIFICANT CHANGE UP (ref 0–41)
ANION GAP SERPL CALC-SCNC: 11 MMOL/L — SIGNIFICANT CHANGE UP (ref 7–14)
AST SERPL-CCNC: 35 U/L — SIGNIFICANT CHANGE UP (ref 0–41)
BASOPHILS # BLD AUTO: 0.03 K/UL — SIGNIFICANT CHANGE UP (ref 0–0.2)
BASOPHILS NFR BLD AUTO: 0.1 % — SIGNIFICANT CHANGE UP (ref 0–1)
BILIRUB SERPL-MCNC: 0.3 MG/DL — SIGNIFICANT CHANGE UP (ref 0.2–1.2)
BUN SERPL-MCNC: 35 MG/DL — HIGH (ref 10–20)
CALCIUM SERPL-MCNC: 11.6 MG/DL — HIGH (ref 8.4–10.5)
CALCIUM UR-MCNC: 15.3 MG/DL — SIGNIFICANT CHANGE UP
CALCIUM/CREAT UR: 2.3 RATIO — HIGH (ref 0–0.2)
CHLORIDE SERPL-SCNC: 111 MMOL/L — HIGH (ref 98–110)
CO2 SERPL-SCNC: 20 MMOL/L — SIGNIFICANT CHANGE UP (ref 17–32)
CREAT ?TM UR-MCNC: 7 MG/DL — SIGNIFICANT CHANGE UP
CREAT SERPL-MCNC: 0.6 MG/DL — LOW (ref 0.7–1.5)
EGFR: 95 ML/MIN/1.73M2 — SIGNIFICANT CHANGE UP
EGFR: 95 ML/MIN/1.73M2 — SIGNIFICANT CHANGE UP
EOSINOPHIL # BLD AUTO: 0.03 K/UL — SIGNIFICANT CHANGE UP (ref 0–0.7)
EOSINOPHIL NFR BLD AUTO: 0.1 % — SIGNIFICANT CHANGE UP (ref 0–8)
ETEC DNA STL QL NAA+PROBE: DETECTED
GAS PNL BLDA: SIGNIFICANT CHANGE UP
GI PCR PANEL: DETECTED
GLUCOSE BLDC GLUCOMTR-MCNC: 100 MG/DL — HIGH (ref 70–99)
GLUCOSE BLDC GLUCOMTR-MCNC: 108 MG/DL — HIGH (ref 70–99)
GLUCOSE BLDC GLUCOMTR-MCNC: 111 MG/DL — HIGH (ref 70–99)
GLUCOSE BLDC GLUCOMTR-MCNC: 113 MG/DL — HIGH (ref 70–99)
GLUCOSE BLDC GLUCOMTR-MCNC: 113 MG/DL — HIGH (ref 70–99)
GLUCOSE SERPL-MCNC: 86 MG/DL — SIGNIFICANT CHANGE UP (ref 70–99)
HCT VFR BLD CALC: 28.9 % — LOW (ref 37–47)
HGB BLD-MCNC: 9.1 G/DL — LOW (ref 12–16)
IMM GRANULOCYTES NFR BLD AUTO: 0.8 % — HIGH (ref 0.1–0.3)
KAPPA LC SER QL IFE: 5.33 MG/DL — HIGH (ref 0.33–1.94)
KAPPA/LAMBDA FREE LIGHT CHAIN RATIO, SERUM: 1 RATIO — SIGNIFICANT CHANGE UP (ref 0.26–1.65)
LAMBDA LC SER QL IFE: 5.33 MG/DL — HIGH (ref 0.57–2.63)
LYMPHOCYTES # BLD AUTO: 1.5 K/UL — SIGNIFICANT CHANGE UP (ref 1.2–3.4)
LYMPHOCYTES # BLD AUTO: 7 % — LOW (ref 20.5–51.1)
MAGNESIUM SERPL-MCNC: 1.8 MG/DL — SIGNIFICANT CHANGE UP (ref 1.8–2.4)
MCHC RBC-ENTMCNC: 27.9 PG — SIGNIFICANT CHANGE UP (ref 27–31)
MCHC RBC-ENTMCNC: 31.5 G/DL — LOW (ref 32–37)
MCV RBC AUTO: 88.7 FL — SIGNIFICANT CHANGE UP (ref 81–99)
MONOCYTES # BLD AUTO: 1.23 K/UL — HIGH (ref 0.1–0.6)
MONOCYTES NFR BLD AUTO: 5.7 % — SIGNIFICANT CHANGE UP (ref 1.7–9.3)
NEUTROPHILS # BLD AUTO: 18.62 K/UL — HIGH (ref 1.4–6.5)
NEUTROPHILS NFR BLD AUTO: 86.3 % — HIGH (ref 42.2–75.2)
NRBC BLD AUTO-RTO: 0 /100 WBCS — SIGNIFICANT CHANGE UP (ref 0–0)
PHOSPHATE SERPL-MCNC: 1.9 MG/DL — LOW (ref 2.1–4.9)
PLATELET # BLD AUTO: 151 K/UL — SIGNIFICANT CHANGE UP (ref 130–400)
PMV BLD: 11.4 FL — HIGH (ref 7.4–10.4)
POTASSIUM SERPL-MCNC: 4.3 MMOL/L — SIGNIFICANT CHANGE UP (ref 3.5–5)
POTASSIUM SERPL-SCNC: 4.3 MMOL/L — SIGNIFICANT CHANGE UP (ref 3.5–5)
PROT SERPL-MCNC: 4.3 G/DL — LOW (ref 6–8)
RBC # BLD: 3.26 M/UL — LOW (ref 4.2–5.4)
RBC # FLD: 14.1 % — SIGNIFICANT CHANGE UP (ref 11.5–14.5)
SODIUM SERPL-SCNC: 142 MMOL/L — SIGNIFICANT CHANGE UP (ref 135–146)
VIT D25+D1,25 OH+D1,25 PNL SERPL-MCNC: 69.7 PG/ML — SIGNIFICANT CHANGE UP (ref 19.9–79.3)
VIT D25+D1,25 OH+D1,25 PNL SERPL-MCNC: 74.7 PG/ML — SIGNIFICANT CHANGE UP (ref 19.9–79.3)
WBC # BLD: 21.58 K/UL — HIGH (ref 4.8–10.8)
WBC # FLD AUTO: 21.58 K/UL — HIGH (ref 4.8–10.8)

## 2025-08-12 PROCEDURE — 93306 TTE W/DOPPLER COMPLETE: CPT | Mod: 26

## 2025-08-12 PROCEDURE — 71045 X-RAY EXAM CHEST 1 VIEW: CPT | Mod: 26

## 2025-08-12 PROCEDURE — 99291 CRITICAL CARE FIRST HOUR: CPT

## 2025-08-12 PROCEDURE — 99255 IP/OBS CONSLTJ NEW/EST HI 80: CPT

## 2025-08-12 RX ORDER — CEFEPIME 2 G/20ML
2000 INJECTION, POWDER, FOR SOLUTION INTRAVENOUS ONCE
Refills: 0 | Status: COMPLETED | OUTPATIENT
Start: 2025-08-12 | End: 2025-08-12

## 2025-08-12 RX ORDER — NOREPINEPHRINE BITARTRATE 8 MG
0.05 SOLUTION INTRAVENOUS
Qty: 16 | Refills: 0 | Status: DISCONTINUED | OUTPATIENT
Start: 2025-08-12 | End: 2025-08-12

## 2025-08-12 RX ORDER — FUROSEMIDE 10 MG/ML
40 INJECTION INTRAMUSCULAR; INTRAVENOUS ONCE
Refills: 0 | Status: DISCONTINUED | OUTPATIENT
Start: 2025-08-12 | End: 2025-08-12

## 2025-08-12 RX ORDER — FUROSEMIDE 10 MG/ML
40 INJECTION INTRAMUSCULAR; INTRAVENOUS ONCE
Refills: 0 | Status: COMPLETED | OUTPATIENT
Start: 2025-08-12 | End: 2025-08-12

## 2025-08-12 RX ORDER — CEFEPIME 2 G/20ML
INJECTION, POWDER, FOR SOLUTION INTRAVENOUS
Refills: 0 | Status: DISCONTINUED | OUTPATIENT
Start: 2025-08-12 | End: 2025-08-13

## 2025-08-12 RX ORDER — VANCOMYCIN HCL IN 5 % DEXTROSE 1.5G/250ML
1000 PLASTIC BAG, INJECTION (ML) INTRAVENOUS ONCE
Refills: 0 | Status: COMPLETED | OUTPATIENT
Start: 2025-08-12 | End: 2025-08-12

## 2025-08-12 RX ORDER — CEFEPIME 2 G/20ML
2000 INJECTION, POWDER, FOR SOLUTION INTRAVENOUS EVERY 12 HOURS
Refills: 0 | Status: DISCONTINUED | OUTPATIENT
Start: 2025-08-12 | End: 2025-08-13

## 2025-08-12 RX ORDER — NOREPINEPHRINE BITARTRATE 8 MG
0.05 SOLUTION INTRAVENOUS
Qty: 8 | Refills: 0 | Status: DISCONTINUED | OUTPATIENT
Start: 2025-08-12 | End: 2025-08-26

## 2025-08-12 RX ORDER — VANCOMYCIN HCL IN 5 % DEXTROSE 1.5G/250ML
125 PLASTIC BAG, INJECTION (ML) INTRAVENOUS EVERY 6 HOURS
Refills: 0 | Status: DISCONTINUED | OUTPATIENT
Start: 2025-08-12 | End: 2025-08-13

## 2025-08-12 RX ADMIN — FUROSEMIDE 40 MILLIGRAM(S): 10 INJECTION INTRAMUSCULAR; INTRAVENOUS at 09:48

## 2025-08-12 RX ADMIN — IPRATROPIUM BROMIDE AND ALBUTEROL SULFATE 3 MILLILITER(S): .5; 2.5 SOLUTION RESPIRATORY (INHALATION) at 14:30

## 2025-08-12 RX ADMIN — TOUCHLESS CARE ZINC OXIDE PROTECTANT 1 APPLICATION(S): 20; 25 SPRAY TOPICAL at 17:56

## 2025-08-12 RX ADMIN — Medication 15 MILLILITER(S): at 17:56

## 2025-08-12 RX ADMIN — IPRATROPIUM BROMIDE AND ALBUTEROL SULFATE 3 MILLILITER(S): .5; 2.5 SOLUTION RESPIRATORY (INHALATION) at 01:13

## 2025-08-12 RX ADMIN — CEFEPIME 100 MILLIGRAM(S): 2 INJECTION, POWDER, FOR SOLUTION INTRAVENOUS at 03:28

## 2025-08-12 RX ADMIN — Medication 50 MILLILITER(S): at 11:21

## 2025-08-12 RX ADMIN — ENOXAPARIN SODIUM 70 MILLIGRAM(S): 100 INJECTION SUBCUTANEOUS at 05:27

## 2025-08-12 RX ADMIN — NOREPINEPHRINE BITARTRATE 6.32 MICROGRAM(S)/KG/MIN: 8 SOLUTION at 00:21

## 2025-08-12 RX ADMIN — TOUCHLESS CARE ZINC OXIDE PROTECTANT 1 APPLICATION(S): 20; 25 SPRAY TOPICAL at 05:26

## 2025-08-12 RX ADMIN — Medication 1 PACKET(S): at 05:25

## 2025-08-12 RX ADMIN — Medication 250 MILLIGRAM(S): at 03:28

## 2025-08-12 RX ADMIN — Medication 1 PACKET(S): at 23:25

## 2025-08-12 RX ADMIN — ENOXAPARIN SODIUM 70 MILLIGRAM(S): 100 INJECTION SUBCUTANEOUS at 18:00

## 2025-08-12 RX ADMIN — Medication 1 APPLICATION(S): at 11:21

## 2025-08-12 RX ADMIN — Medication 1 APPLICATION(S): at 05:26

## 2025-08-12 RX ADMIN — Medication 125 MILLIGRAM(S): at 09:49

## 2025-08-12 RX ADMIN — Medication 1 PACKET(S): at 11:16

## 2025-08-12 RX ADMIN — Medication 1 APPLICATION(S): at 17:56

## 2025-08-12 RX ADMIN — CEFEPIME 100 MILLIGRAM(S): 2 INJECTION, POWDER, FOR SOLUTION INTRAVENOUS at 17:56

## 2025-08-12 RX ADMIN — Medication 125 MILLIGRAM(S): at 23:25

## 2025-08-12 RX ADMIN — Medication 125 MILLIGRAM(S): at 19:32

## 2025-08-12 RX ADMIN — Medication 400 MILLIGRAM(S): at 07:39

## 2025-08-12 RX ADMIN — Medication 400 MILLIGRAM(S): at 19:33

## 2025-08-12 RX ADMIN — Medication 125 MILLIGRAM(S): at 11:21

## 2025-08-12 RX ADMIN — Medication 40 MILLIGRAM(S): at 11:16

## 2025-08-12 RX ADMIN — IPRATROPIUM BROMIDE AND ALBUTEROL SULFATE 3 MILLILITER(S): .5; 2.5 SOLUTION RESPIRATORY (INHALATION) at 08:56

## 2025-08-12 RX ADMIN — Medication 400 MILLIGRAM(S): at 13:13

## 2025-08-12 RX ADMIN — Medication 1 PACKET(S): at 19:32

## 2025-08-13 LAB
-  AMPICILLIN/SULBACTAM: SIGNIFICANT CHANGE UP
-  AMPICILLIN: SIGNIFICANT CHANGE UP
-  AZTREONAM: SIGNIFICANT CHANGE UP
-  CEFAZOLIN: SIGNIFICANT CHANGE UP
-  CEFEPIME: SIGNIFICANT CHANGE UP
-  CEFTRIAXONE: SIGNIFICANT CHANGE UP
-  CEFUROXIME: SIGNIFICANT CHANGE UP
-  CIPROFLOXACIN: SIGNIFICANT CHANGE UP
-  ERTAPENEM: SIGNIFICANT CHANGE UP
-  GENTAMICIN: SIGNIFICANT CHANGE UP
-  IMIPENEM: SIGNIFICANT CHANGE UP
-  LEVOFLOXACIN: SIGNIFICANT CHANGE UP
-  MEROPENEM: SIGNIFICANT CHANGE UP
-  NITROFURANTOIN: SIGNIFICANT CHANGE UP
-  PIPERACILLIN/TAZOBACTAM: SIGNIFICANT CHANGE UP
-  TIGECYCLINE: SIGNIFICANT CHANGE UP
-  TOBRAMYCIN: SIGNIFICANT CHANGE UP
-  TRIMETHOPRIM/SULFAMETHOXAZOLE: SIGNIFICANT CHANGE UP
ALBUMIN SERPL ELPH-MCNC: 2 G/DL — LOW (ref 3.5–5.2)
ALP SERPL-CCNC: 96 U/L — SIGNIFICANT CHANGE UP (ref 30–115)
ALT FLD-CCNC: 11 U/L — SIGNIFICANT CHANGE UP (ref 0–41)
ANION GAP SERPL CALC-SCNC: 6 MMOL/L — LOW (ref 7–14)
ANION GAP SERPL CALC-SCNC: 8 MMOL/L — SIGNIFICANT CHANGE UP (ref 7–14)
ANION GAP SERPL CALC-SCNC: 8 MMOL/L — SIGNIFICANT CHANGE UP (ref 7–14)
ANION GAP SERPL CALC-SCNC: 9 MMOL/L — SIGNIFICANT CHANGE UP (ref 7–14)
AST SERPL-CCNC: 14 U/L — SIGNIFICANT CHANGE UP (ref 0–41)
BASOPHILS # BLD AUTO: 0 K/UL — SIGNIFICANT CHANGE UP (ref 0–0.2)
BASOPHILS # BLD AUTO: 0.01 K/UL — SIGNIFICANT CHANGE UP (ref 0–0.2)
BASOPHILS # BLD AUTO: 0.02 K/UL — SIGNIFICANT CHANGE UP (ref 0–0.2)
BASOPHILS NFR BLD AUTO: 0 % — SIGNIFICANT CHANGE UP (ref 0–1)
BASOPHILS NFR BLD AUTO: 0.1 % — SIGNIFICANT CHANGE UP (ref 0–1)
BASOPHILS NFR BLD AUTO: 0.3 % — SIGNIFICANT CHANGE UP (ref 0–1)
BILIRUB SERPL-MCNC: 0.3 MG/DL — SIGNIFICANT CHANGE UP (ref 0.2–1.2)
BUN SERPL-MCNC: 28 MG/DL — HIGH (ref 10–20)
BUN SERPL-MCNC: 28 MG/DL — HIGH (ref 10–20)
BUN SERPL-MCNC: 29 MG/DL — HIGH (ref 10–20)
BUN SERPL-MCNC: 32 MG/DL — HIGH (ref 10–20)
C DIFF GDH STL QL: SIGNIFICANT CHANGE UP
C DIFF GDH STL QL: SIGNIFICANT CHANGE UP
CALCIUM SERPL-MCNC: 12.3 MG/DL — HIGH (ref 8.4–10.5)
CALCIUM SERPL-MCNC: 13.1 MG/DL — HIGH (ref 8.4–10.5)
CALCIUM SERPL-MCNC: 13.3 MG/DL — HIGH (ref 8.4–10.5)
CALCIUM SERPL-MCNC: 13.4 MG/DL — HIGH (ref 8.4–10.5)
CHLORIDE SERPL-SCNC: 111 MMOL/L — HIGH (ref 98–110)
CHLORIDE SERPL-SCNC: 114 MMOL/L — HIGH (ref 98–110)
CHLORIDE SERPL-SCNC: 115 MMOL/L — HIGH (ref 98–110)
CHLORIDE SERPL-SCNC: 116 MMOL/L — HIGH (ref 98–110)
CO2 SERPL-SCNC: 24 MMOL/L — SIGNIFICANT CHANGE UP (ref 17–32)
CO2 SERPL-SCNC: 26 MMOL/L — SIGNIFICANT CHANGE UP (ref 17–32)
CO2 SERPL-SCNC: 26 MMOL/L — SIGNIFICANT CHANGE UP (ref 17–32)
CO2 SERPL-SCNC: 27 MMOL/L — SIGNIFICANT CHANGE UP (ref 17–32)
CREAT SERPL-MCNC: 0.6 MG/DL — LOW (ref 0.7–1.5)
CREAT SERPL-MCNC: 0.7 MG/DL — SIGNIFICANT CHANGE UP (ref 0.7–1.5)
EGFR: 92 ML/MIN/1.73M2 — SIGNIFICANT CHANGE UP
EGFR: 92 ML/MIN/1.73M2 — SIGNIFICANT CHANGE UP
EGFR: 95 ML/MIN/1.73M2 — SIGNIFICANT CHANGE UP
EOSINOPHIL # BLD AUTO: 0.01 K/UL — SIGNIFICANT CHANGE UP (ref 0–0.7)
EOSINOPHIL NFR BLD AUTO: 0.1 % — SIGNIFICANT CHANGE UP (ref 0–8)
EOSINOPHIL NFR BLD AUTO: 0.2 % — SIGNIFICANT CHANGE UP (ref 0–8)
EOSINOPHIL NFR BLD AUTO: 0.2 % — SIGNIFICANT CHANGE UP (ref 0–8)
GLUCOSE BLDC GLUCOMTR-MCNC: 123 MG/DL — HIGH (ref 70–99)
GLUCOSE BLDC GLUCOMTR-MCNC: 130 MG/DL — HIGH (ref 70–99)
GLUCOSE BLDC GLUCOMTR-MCNC: 84 MG/DL — SIGNIFICANT CHANGE UP (ref 70–99)
GLUCOSE BLDC GLUCOMTR-MCNC: 86 MG/DL — SIGNIFICANT CHANGE UP (ref 70–99)
GLUCOSE SERPL-MCNC: 80 MG/DL — SIGNIFICANT CHANGE UP (ref 70–99)
GLUCOSE SERPL-MCNC: 83 MG/DL — SIGNIFICANT CHANGE UP (ref 70–99)
GLUCOSE SERPL-MCNC: 83 MG/DL — SIGNIFICANT CHANGE UP (ref 70–99)
GLUCOSE SERPL-MCNC: 87 MG/DL — SIGNIFICANT CHANGE UP (ref 70–99)
HCT VFR BLD CALC: 23.9 % — LOW (ref 37–47)
HCT VFR BLD CALC: 24.9 % — LOW (ref 37–47)
HCT VFR BLD CALC: 25.1 % — LOW (ref 37–47)
HGB BLD-MCNC: 7.7 G/DL — LOW (ref 12–16)
HGB BLD-MCNC: 7.8 G/DL — LOW (ref 12–16)
HGB BLD-MCNC: 8 G/DL — LOW (ref 12–16)
IMM GRANULOCYTES NFR BLD AUTO: 0.5 % — HIGH (ref 0.1–0.3)
IMM GRANULOCYTES NFR BLD AUTO: 0.7 % — HIGH (ref 0.1–0.3)
IMM GRANULOCYTES NFR BLD AUTO: 0.8 % — HIGH (ref 0.1–0.3)
LYMPHOCYTES # BLD AUTO: 0.62 K/UL — LOW (ref 1.2–3.4)
LYMPHOCYTES # BLD AUTO: 0.65 K/UL — LOW (ref 1.2–3.4)
LYMPHOCYTES # BLD AUTO: 1.12 K/UL — LOW (ref 1.2–3.4)
LYMPHOCYTES # BLD AUTO: 10.2 % — LOW (ref 20.5–51.1)
LYMPHOCYTES # BLD AUTO: 11.9 % — LOW (ref 20.5–51.1)
LYMPHOCYTES # BLD AUTO: 9.2 % — LOW (ref 20.5–51.1)
MAGNESIUM SERPL-MCNC: 1.4 MG/DL — LOW (ref 1.8–2.4)
MAGNESIUM SERPL-MCNC: 2 MG/DL — SIGNIFICANT CHANGE UP (ref 1.8–2.4)
MCHC RBC-ENTMCNC: 27.5 PG — SIGNIFICANT CHANGE UP (ref 27–31)
MCHC RBC-ENTMCNC: 28.3 PG — SIGNIFICANT CHANGE UP (ref 27–31)
MCHC RBC-ENTMCNC: 28.5 PG — SIGNIFICANT CHANGE UP (ref 27–31)
MCHC RBC-ENTMCNC: 31.1 G/DL — LOW (ref 32–37)
MCHC RBC-ENTMCNC: 32.1 G/DL — SIGNIFICANT CHANGE UP (ref 32–37)
MCHC RBC-ENTMCNC: 32.2 G/DL — SIGNIFICANT CHANGE UP (ref 32–37)
MCV RBC AUTO: 87.9 FL — SIGNIFICANT CHANGE UP (ref 81–99)
MCV RBC AUTO: 88.4 FL — SIGNIFICANT CHANGE UP (ref 81–99)
MCV RBC AUTO: 88.6 FL — SIGNIFICANT CHANGE UP (ref 81–99)
METHOD TYPE: SIGNIFICANT CHANGE UP
MONOCYTES # BLD AUTO: 0.36 K/UL — SIGNIFICANT CHANGE UP (ref 0.1–0.6)
MONOCYTES # BLD AUTO: 0.45 K/UL — SIGNIFICANT CHANGE UP (ref 0.1–0.6)
MONOCYTES # BLD AUTO: 0.72 K/UL — HIGH (ref 0.1–0.6)
MONOCYTES NFR BLD AUTO: 5.9 % — SIGNIFICANT CHANGE UP (ref 1.7–9.3)
MONOCYTES NFR BLD AUTO: 5.9 % — SIGNIFICANT CHANGE UP (ref 1.7–9.3)
MONOCYTES NFR BLD AUTO: 8.2 % — SIGNIFICANT CHANGE UP (ref 1.7–9.3)
NEUTROPHILS # BLD AUTO: 10.22 K/UL — HIGH (ref 1.4–6.5)
NEUTROPHILS # BLD AUTO: 4.32 K/UL — SIGNIFICANT CHANGE UP (ref 1.4–6.5)
NEUTROPHILS # BLD AUTO: 5 K/UL — SIGNIFICANT CHANGE UP (ref 1.4–6.5)
NEUTROPHILS NFR BLD AUTO: 79.2 % — HIGH (ref 42.2–75.2)
NEUTROPHILS NFR BLD AUTO: 82.6 % — HIGH (ref 42.2–75.2)
NEUTROPHILS NFR BLD AUTO: 84 % — HIGH (ref 42.2–75.2)
NRBC BLD AUTO-RTO: 0 /100 WBCS — SIGNIFICANT CHANGE UP (ref 0–0)
PLATELET # BLD AUTO: 209 K/UL — SIGNIFICANT CHANGE UP (ref 130–400)
PLATELET # BLD AUTO: 223 K/UL — SIGNIFICANT CHANGE UP (ref 130–400)
PLATELET # BLD AUTO: 233 K/UL — SIGNIFICANT CHANGE UP (ref 130–400)
PMV BLD: 10.4 FL — SIGNIFICANT CHANGE UP (ref 7.4–10.4)
PMV BLD: 10.9 FL — HIGH (ref 7.4–10.4)
PMV BLD: 11.5 FL — HIGH (ref 7.4–10.4)
POTASSIUM SERPL-MCNC: 3 MMOL/L — LOW (ref 3.5–5)
POTASSIUM SERPL-MCNC: 3.8 MMOL/L — SIGNIFICANT CHANGE UP (ref 3.5–5)
POTASSIUM SERPL-MCNC: 3.8 MMOL/L — SIGNIFICANT CHANGE UP (ref 3.5–5)
POTASSIUM SERPL-MCNC: 3.9 MMOL/L — SIGNIFICANT CHANGE UP (ref 3.5–5)
POTASSIUM SERPL-SCNC: 3 MMOL/L — LOW (ref 3.5–5)
POTASSIUM SERPL-SCNC: 3.8 MMOL/L — SIGNIFICANT CHANGE UP (ref 3.5–5)
POTASSIUM SERPL-SCNC: 3.8 MMOL/L — SIGNIFICANT CHANGE UP (ref 3.5–5)
POTASSIUM SERPL-SCNC: 3.9 MMOL/L — SIGNIFICANT CHANGE UP (ref 3.5–5)
PROT SERPL-MCNC: 4.1 G/DL — LOW (ref 6–8)
RBC # BLD: 2.72 M/UL — LOW (ref 4.2–5.4)
RBC # BLD: 2.81 M/UL — LOW (ref 4.2–5.4)
RBC # BLD: 2.84 M/UL — LOW (ref 4.2–5.4)
RBC # FLD: 14 % — SIGNIFICANT CHANGE UP (ref 11.5–14.5)
RBC # FLD: 14.1 % — SIGNIFICANT CHANGE UP (ref 11.5–14.5)
RBC # FLD: 14.1 % — SIGNIFICANT CHANGE UP (ref 11.5–14.5)
SODIUM SERPL-SCNC: 145 MMOL/L — SIGNIFICANT CHANGE UP (ref 135–146)
SODIUM SERPL-SCNC: 148 MMOL/L — HIGH (ref 135–146)
SODIUM SERPL-SCNC: 148 MMOL/L — HIGH (ref 135–146)
SODIUM SERPL-SCNC: 149 MMOL/L — HIGH (ref 135–146)
WBC # BLD: 12.16 K/UL — HIGH (ref 4.8–10.8)
WBC # BLD: 5.46 K/UL — SIGNIFICANT CHANGE UP (ref 4.8–10.8)
WBC # BLD: 6.06 K/UL — SIGNIFICANT CHANGE UP (ref 4.8–10.8)
WBC # FLD AUTO: 12.16 K/UL — HIGH (ref 4.8–10.8)
WBC # FLD AUTO: 5.46 K/UL — SIGNIFICANT CHANGE UP (ref 4.8–10.8)
WBC # FLD AUTO: 6.06 K/UL — SIGNIFICANT CHANGE UP (ref 4.8–10.8)

## 2025-08-13 PROCEDURE — 71045 X-RAY EXAM CHEST 1 VIEW: CPT | Mod: 26

## 2025-08-13 PROCEDURE — 99233 SBSQ HOSP IP/OBS HIGH 50: CPT

## 2025-08-13 PROCEDURE — 99221 1ST HOSP IP/OBS SF/LOW 40: CPT

## 2025-08-13 RX ORDER — AMPICILLIN SODIUM AND SULBACTAM SODIUM 1; .5 G/1; G/1
INJECTION, POWDER, FOR SOLUTION INTRAMUSCULAR; INTRAVENOUS
Refills: 0 | Status: DISCONTINUED | OUTPATIENT
Start: 2025-08-13 | End: 2025-08-15

## 2025-08-13 RX ORDER — ZOLEDRONIC ACID 0.05 MG/ML
4 INJECTION, SOLUTION INTRAVENOUS ONCE
Refills: 0 | Status: COMPLETED | OUTPATIENT
Start: 2025-08-13 | End: 2025-08-13

## 2025-08-13 RX ORDER — CINACALCET 30 MG/1
60 TABLET, FILM COATED ORAL EVERY 12 HOURS
Refills: 0 | Status: DISCONTINUED | OUTPATIENT
Start: 2025-08-13 | End: 2025-08-16

## 2025-08-13 RX ORDER — AMPICILLIN SODIUM AND SULBACTAM SODIUM 1; .5 G/1; G/1
3 INJECTION, POWDER, FOR SOLUTION INTRAMUSCULAR; INTRAVENOUS EVERY 6 HOURS
Refills: 0 | Status: DISCONTINUED | OUTPATIENT
Start: 2025-08-13 | End: 2025-08-15

## 2025-08-13 RX ORDER — AMPICILLIN SODIUM AND SULBACTAM SODIUM 1; .5 G/1; G/1
3 INJECTION, POWDER, FOR SOLUTION INTRAMUSCULAR; INTRAVENOUS ONCE
Refills: 0 | Status: COMPLETED | OUTPATIENT
Start: 2025-08-13 | End: 2025-08-13

## 2025-08-13 RX ORDER — MAGNESIUM SULFATE 500 MG/ML
2 SYRINGE (ML) INJECTION
Refills: 0 | Status: COMPLETED | OUTPATIENT
Start: 2025-08-13 | End: 2025-08-13

## 2025-08-13 RX ORDER — SODIUM CHLORIDE 9 G/1000ML
1000 INJECTION, SOLUTION INTRAVENOUS
Refills: 0 | Status: DISCONTINUED | OUTPATIENT
Start: 2025-08-13 | End: 2025-08-14

## 2025-08-13 RX ADMIN — IPRATROPIUM BROMIDE AND ALBUTEROL SULFATE 3 MILLILITER(S): .5; 2.5 SOLUTION RESPIRATORY (INHALATION) at 03:37

## 2025-08-13 RX ADMIN — TOUCHLESS CARE ZINC OXIDE PROTECTANT 1 APPLICATION(S): 20; 25 SPRAY TOPICAL at 05:41

## 2025-08-13 RX ADMIN — ENOXAPARIN SODIUM 70 MILLIGRAM(S): 100 INJECTION SUBCUTANEOUS at 18:02

## 2025-08-13 RX ADMIN — Medication 2 PUFF(S): at 05:40

## 2025-08-13 RX ADMIN — Medication 15 MILLILITER(S): at 05:40

## 2025-08-13 RX ADMIN — IPRATROPIUM BROMIDE AND ALBUTEROL SULFATE 3 MILLILITER(S): .5; 2.5 SOLUTION RESPIRATORY (INHALATION) at 00:49

## 2025-08-13 RX ADMIN — Medication 125 MILLIGRAM(S): at 11:42

## 2025-08-13 RX ADMIN — IPRATROPIUM BROMIDE AND ALBUTEROL SULFATE 3 MILLILITER(S): .5; 2.5 SOLUTION RESPIRATORY (INHALATION) at 13:54

## 2025-08-13 RX ADMIN — Medication 1 APPLICATION(S): at 18:03

## 2025-08-13 RX ADMIN — Medication 50 MILLIEQUIVALENT(S): at 08:36

## 2025-08-13 RX ADMIN — Medication 1 PACKET(S): at 18:02

## 2025-08-13 RX ADMIN — Medication 40 MILLIGRAM(S): at 11:43

## 2025-08-13 RX ADMIN — AMPICILLIN SODIUM AND SULBACTAM SODIUM 200 GRAM(S): 1; .5 INJECTION, POWDER, FOR SOLUTION INTRAMUSCULAR; INTRAVENOUS at 18:03

## 2025-08-13 RX ADMIN — Medication 25 GRAM(S): at 08:11

## 2025-08-13 RX ADMIN — Medication 1 APPLICATION(S): at 11:52

## 2025-08-13 RX ADMIN — Medication 50 MILLIEQUIVALENT(S): at 07:57

## 2025-08-13 RX ADMIN — Medication 1 APPLICATION(S): at 05:40

## 2025-08-13 RX ADMIN — ENOXAPARIN SODIUM 70 MILLIGRAM(S): 100 INJECTION SUBCUTANEOUS at 05:40

## 2025-08-13 RX ADMIN — Medication 25 GRAM(S): at 07:57

## 2025-08-13 RX ADMIN — Medication 1 PACKET(S): at 11:42

## 2025-08-13 RX ADMIN — TOUCHLESS CARE ZINC OXIDE PROTECTANT 1 APPLICATION(S): 20; 25 SPRAY TOPICAL at 18:03

## 2025-08-13 RX ADMIN — IPRATROPIUM BROMIDE AND ALBUTEROL SULFATE 3 MILLILITER(S): .5; 2.5 SOLUTION RESPIRATORY (INHALATION) at 19:39

## 2025-08-13 RX ADMIN — CINACALCET 60 MILLIGRAM(S): 30 TABLET, FILM COATED ORAL at 13:58

## 2025-08-13 RX ADMIN — Medication 50 MILLIEQUIVALENT(S): at 11:43

## 2025-08-13 RX ADMIN — Medication 400 MILLIGRAM(S): at 08:40

## 2025-08-13 RX ADMIN — Medication 25 GRAM(S): at 09:36

## 2025-08-13 RX ADMIN — Medication 15 MILLILITER(S): at 18:02

## 2025-08-13 RX ADMIN — IPRATROPIUM BROMIDE AND ALBUTEROL SULFATE 3 MILLILITER(S): .5; 2.5 SOLUTION RESPIRATORY (INHALATION) at 09:47

## 2025-08-13 RX ADMIN — CEFEPIME 100 MILLIGRAM(S): 2 INJECTION, POWDER, FOR SOLUTION INTRAVENOUS at 05:40

## 2025-08-13 RX ADMIN — Medication 125 MILLIGRAM(S): at 05:42

## 2025-08-13 RX ADMIN — Medication 1 PACKET(S): at 05:42

## 2025-08-13 RX ADMIN — Medication 400 MILLIGRAM(S): at 11:42

## 2025-08-13 RX ADMIN — SODIUM CHLORIDE 75 MILLILITER(S): 9 INJECTION, SOLUTION INTRAVENOUS at 08:39

## 2025-08-13 RX ADMIN — CINACALCET 60 MILLIGRAM(S): 30 TABLET, FILM COATED ORAL at 18:02

## 2025-08-13 RX ADMIN — Medication 400 MILLIGRAM(S): at 18:02

## 2025-08-13 RX ADMIN — AMPICILLIN SODIUM AND SULBACTAM SODIUM 200 GRAM(S): 1; .5 INJECTION, POWDER, FOR SOLUTION INTRAMUSCULAR; INTRAVENOUS at 13:58

## 2025-08-13 RX ADMIN — ZOLEDRONIC ACID 4 MILLIGRAM(S): 0.05 INJECTION, SOLUTION INTRAVENOUS at 09:35

## 2025-08-14 ENCOUNTER — RESULT REVIEW (OUTPATIENT)
Age: 75
End: 2025-08-14

## 2025-08-14 LAB
-  AMPICILLIN: SIGNIFICANT CHANGE UP
-  CIPROFLOXACIN: SIGNIFICANT CHANGE UP
-  LEVOFLOXACIN: SIGNIFICANT CHANGE UP
-  NITROFURANTOIN: SIGNIFICANT CHANGE UP
-  TETRACYCLINE: SIGNIFICANT CHANGE UP
-  VANCOMYCIN: SIGNIFICANT CHANGE UP
ANION GAP SERPL CALC-SCNC: 9 MMOL/L — SIGNIFICANT CHANGE UP (ref 7–14)
BUN SERPL-MCNC: 26 MG/DL — HIGH (ref 10–20)
CALCIUM SERPL-MCNC: 12.6 MG/DL — HIGH (ref 8.4–10.5)
CHLORIDE SERPL-SCNC: 115 MMOL/L — HIGH (ref 98–110)
CO2 SERPL-SCNC: 26 MMOL/L — SIGNIFICANT CHANGE UP (ref 17–32)
CREAT SERPL-MCNC: 0.7 MG/DL — SIGNIFICANT CHANGE UP (ref 0.7–1.5)
CULTURE RESULTS: ABNORMAL
EGFR: 92 ML/MIN/1.73M2 — SIGNIFICANT CHANGE UP
EGFR: 92 ML/MIN/1.73M2 — SIGNIFICANT CHANGE UP
GLUCOSE BLDC GLUCOMTR-MCNC: 121 MG/DL — HIGH (ref 70–99)
GLUCOSE BLDC GLUCOMTR-MCNC: 92 MG/DL — SIGNIFICANT CHANGE UP (ref 70–99)
GLUCOSE BLDC GLUCOMTR-MCNC: 95 MG/DL — SIGNIFICANT CHANGE UP (ref 70–99)
GLUCOSE SERPL-MCNC: 90 MG/DL — SIGNIFICANT CHANGE UP (ref 70–99)
HCT VFR BLD CALC: 24.6 % — LOW (ref 37–47)
HGB BLD-MCNC: 7.6 G/DL — LOW (ref 12–16)
INTERPRETATION 24H UR IFE-IMP: SIGNIFICANT CHANGE UP
MCHC RBC-ENTMCNC: 28 PG — SIGNIFICANT CHANGE UP (ref 27–31)
MCHC RBC-ENTMCNC: 30.9 G/DL — LOW (ref 32–37)
MCV RBC AUTO: 90.8 FL — SIGNIFICANT CHANGE UP (ref 81–99)
METHOD TYPE: SIGNIFICANT CHANGE UP
NRBC BLD AUTO-RTO: 0 /100 WBCS — SIGNIFICANT CHANGE UP (ref 0–0)
ORGANISM # SPEC MICROSCOPIC CNT: ABNORMAL
ORGANISM # SPEC MICROSCOPIC CNT: ABNORMAL
ORGANISM # SPEC MICROSCOPIC CNT: SIGNIFICANT CHANGE UP
PLATELET # BLD AUTO: 186 K/UL — SIGNIFICANT CHANGE UP (ref 130–400)
PMV BLD: 10.7 FL — HIGH (ref 7.4–10.4)
POTASSIUM SERPL-MCNC: 3.2 MMOL/L — LOW (ref 3.5–5)
POTASSIUM SERPL-SCNC: 3.2 MMOL/L — LOW (ref 3.5–5)
RBC # BLD: 2.71 M/UL — LOW (ref 4.2–5.4)
RBC # FLD: 14.2 % — SIGNIFICANT CHANGE UP (ref 11.5–14.5)
SODIUM SERPL-SCNC: 150 MMOL/L — HIGH (ref 135–146)
SPECIMEN SOURCE: SIGNIFICANT CHANGE UP
WBC # BLD: 5 K/UL — SIGNIFICANT CHANGE UP (ref 4.8–10.8)
WBC # FLD AUTO: 5 K/UL — SIGNIFICANT CHANGE UP (ref 4.8–10.8)

## 2025-08-14 PROCEDURE — 93971 EXTREMITY STUDY: CPT | Mod: 26,LT

## 2025-08-14 PROCEDURE — 99233 SBSQ HOSP IP/OBS HIGH 50: CPT

## 2025-08-14 PROCEDURE — 99232 SBSQ HOSP IP/OBS MODERATE 35: CPT

## 2025-08-14 PROCEDURE — 93308 TTE F-UP OR LMTD: CPT | Mod: 26

## 2025-08-14 RX ORDER — SODIUM CHLORIDE 9 G/1000ML
1000 INJECTION, SOLUTION INTRAVENOUS
Refills: 0 | Status: DISCONTINUED | OUTPATIENT
Start: 2025-08-14 | End: 2025-08-14

## 2025-08-14 RX ORDER — VANCOMYCIN HCL IN 5 % DEXTROSE 1.5G/250ML
125 PLASTIC BAG, INJECTION (ML) INTRAVENOUS EVERY 6 HOURS
Refills: 0 | Status: DISCONTINUED | OUTPATIENT
Start: 2025-08-14 | End: 2025-08-21

## 2025-08-14 RX ORDER — SODIUM CHLORIDE 9 G/1000ML
1000 INJECTION, SOLUTION INTRAVENOUS
Refills: 0 | Status: DISCONTINUED | OUTPATIENT
Start: 2025-08-14 | End: 2025-08-16

## 2025-08-14 RX ORDER — ZOLEDRONIC ACID 0.05 MG/ML
4 INJECTION, SOLUTION INTRAVENOUS ONCE
Refills: 0 | Status: COMPLETED | OUTPATIENT
Start: 2025-08-14 | End: 2025-08-14

## 2025-08-14 RX ADMIN — SODIUM CHLORIDE 100 MILLILITER(S): 9 INJECTION, SOLUTION INTRAVENOUS at 17:37

## 2025-08-14 RX ADMIN — Medication 1 PACKET(S): at 05:34

## 2025-08-14 RX ADMIN — IPRATROPIUM BROMIDE AND ALBUTEROL SULFATE 3 MILLILITER(S): .5; 2.5 SOLUTION RESPIRATORY (INHALATION) at 02:38

## 2025-08-14 RX ADMIN — Medication 15 MILLILITER(S): at 05:34

## 2025-08-14 RX ADMIN — Medication 1 PACKET(S): at 01:13

## 2025-08-14 RX ADMIN — Medication 1 APPLICATION(S): at 05:34

## 2025-08-14 RX ADMIN — IPRATROPIUM BROMIDE AND ALBUTEROL SULFATE 3 MILLILITER(S): .5; 2.5 SOLUTION RESPIRATORY (INHALATION) at 20:06

## 2025-08-14 RX ADMIN — TOUCHLESS CARE ZINC OXIDE PROTECTANT 1 APPLICATION(S): 20; 25 SPRAY TOPICAL at 17:39

## 2025-08-14 RX ADMIN — Medication 15 MILLILITER(S): at 17:38

## 2025-08-14 RX ADMIN — Medication 1 APPLICATION(S): at 17:39

## 2025-08-14 RX ADMIN — IPRATROPIUM BROMIDE AND ALBUTEROL SULFATE 3 MILLILITER(S): .5; 2.5 SOLUTION RESPIRATORY (INHALATION) at 13:37

## 2025-08-14 RX ADMIN — Medication 125 MILLIGRAM(S): at 17:40

## 2025-08-14 RX ADMIN — Medication 400 MILLIGRAM(S): at 12:44

## 2025-08-14 RX ADMIN — AMPICILLIN SODIUM AND SULBACTAM SODIUM 200 GRAM(S): 1; .5 INJECTION, POWDER, FOR SOLUTION INTRAMUSCULAR; INTRAVENOUS at 05:34

## 2025-08-14 RX ADMIN — Medication 40 MILLIGRAM(S): at 12:44

## 2025-08-14 RX ADMIN — Medication 1 PACKET(S): at 12:44

## 2025-08-14 RX ADMIN — ZOLEDRONIC ACID 420 MILLIGRAM(S): 0.05 INJECTION, SOLUTION INTRAVENOUS at 12:44

## 2025-08-14 RX ADMIN — Medication 125 MILLIGRAM(S): at 23:41

## 2025-08-14 RX ADMIN — AMPICILLIN SODIUM AND SULBACTAM SODIUM 200 GRAM(S): 1; .5 INJECTION, POWDER, FOR SOLUTION INTRAMUSCULAR; INTRAVENOUS at 17:37

## 2025-08-14 RX ADMIN — NOREPINEPHRINE BITARTRATE 6.32 MICROGRAM(S)/KG/MIN: 8 SOLUTION at 02:25

## 2025-08-14 RX ADMIN — IPRATROPIUM BROMIDE AND ALBUTEROL SULFATE 3 MILLILITER(S): .5; 2.5 SOLUTION RESPIRATORY (INHALATION) at 13:38

## 2025-08-14 RX ADMIN — Medication 1 PACKET(S): at 23:43

## 2025-08-14 RX ADMIN — AMPICILLIN SODIUM AND SULBACTAM SODIUM 200 GRAM(S): 1; .5 INJECTION, POWDER, FOR SOLUTION INTRAMUSCULAR; INTRAVENOUS at 00:34

## 2025-08-14 RX ADMIN — AMPICILLIN SODIUM AND SULBACTAM SODIUM 200 GRAM(S): 1; .5 INJECTION, POWDER, FOR SOLUTION INTRAMUSCULAR; INTRAVENOUS at 12:44

## 2025-08-14 RX ADMIN — ENOXAPARIN SODIUM 70 MILLIGRAM(S): 100 INJECTION SUBCUTANEOUS at 17:38

## 2025-08-14 RX ADMIN — Medication 1 PACKET(S): at 17:38

## 2025-08-14 RX ADMIN — AMPICILLIN SODIUM AND SULBACTAM SODIUM 200 GRAM(S): 1; .5 INJECTION, POWDER, FOR SOLUTION INTRAMUSCULAR; INTRAVENOUS at 23:41

## 2025-08-14 RX ADMIN — Medication 400 MILLIGRAM(S): at 17:38

## 2025-08-14 RX ADMIN — SODIUM CHLORIDE 75 MILLILITER(S): 9 INJECTION, SOLUTION INTRAVENOUS at 02:23

## 2025-08-14 RX ADMIN — SODIUM CHLORIDE 125 MILLILITER(S): 9 INJECTION, SOLUTION INTRAVENOUS at 12:45

## 2025-08-14 RX ADMIN — Medication 400 MILLIGRAM(S): at 08:15

## 2025-08-14 RX ADMIN — TOUCHLESS CARE ZINC OXIDE PROTECTANT 1 APPLICATION(S): 20; 25 SPRAY TOPICAL at 05:33

## 2025-08-14 RX ADMIN — ENOXAPARIN SODIUM 70 MILLIGRAM(S): 100 INJECTION SUBCUTANEOUS at 05:33

## 2025-08-14 RX ADMIN — CINACALCET 60 MILLIGRAM(S): 30 TABLET, FILM COATED ORAL at 17:39

## 2025-08-14 RX ADMIN — Medication 1 APPLICATION(S): at 12:00

## 2025-08-14 RX ADMIN — Medication 125 MILLIGRAM(S): at 15:00

## 2025-08-14 RX ADMIN — CINACALCET 60 MILLIGRAM(S): 30 TABLET, FILM COATED ORAL at 05:34

## 2025-08-15 LAB
ALBUMIN SERPL ELPH-MCNC: 2.1 G/DL — LOW (ref 3.5–5.2)
ALP SERPL-CCNC: 80 U/L — SIGNIFICANT CHANGE UP (ref 30–115)
ALT FLD-CCNC: 12 U/L — SIGNIFICANT CHANGE UP (ref 0–41)
ANION GAP SERPL CALC-SCNC: 10 MMOL/L — SIGNIFICANT CHANGE UP (ref 7–14)
AST SERPL-CCNC: 14 U/L — SIGNIFICANT CHANGE UP (ref 0–41)
BASOPHILS # BLD AUTO: 0.01 K/UL — SIGNIFICANT CHANGE UP (ref 0–0.2)
BASOPHILS NFR BLD AUTO: 0.2 % — SIGNIFICANT CHANGE UP (ref 0–1)
BILIRUB SERPL-MCNC: <0.2 MG/DL — SIGNIFICANT CHANGE UP (ref 0.2–1.2)
BUN SERPL-MCNC: 23 MG/DL — HIGH (ref 10–20)
CALCIUM SERPL-MCNC: 11.6 MG/DL — HIGH (ref 8.4–10.5)
CHLORIDE SERPL-SCNC: 112 MMOL/L — HIGH (ref 98–110)
CO2 SERPL-SCNC: 26 MMOL/L — SIGNIFICANT CHANGE UP (ref 17–32)
CREAT SERPL-MCNC: 0.7 MG/DL — SIGNIFICANT CHANGE UP (ref 0.7–1.5)
EGFR: 92 ML/MIN/1.73M2 — SIGNIFICANT CHANGE UP
EGFR: 92 ML/MIN/1.73M2 — SIGNIFICANT CHANGE UP
EOSINOPHIL # BLD AUTO: 0.03 K/UL — SIGNIFICANT CHANGE UP (ref 0–0.7)
EOSINOPHIL NFR BLD AUTO: 0.5 % — SIGNIFICANT CHANGE UP (ref 0–8)
GLUCOSE BLDC GLUCOMTR-MCNC: 128 MG/DL — HIGH (ref 70–99)
GLUCOSE BLDC GLUCOMTR-MCNC: 129 MG/DL — HIGH (ref 70–99)
GLUCOSE BLDC GLUCOMTR-MCNC: 129 MG/DL — HIGH (ref 70–99)
GLUCOSE BLDC GLUCOMTR-MCNC: 140 MG/DL — HIGH (ref 70–99)
GLUCOSE SERPL-MCNC: 108 MG/DL — HIGH (ref 70–99)
HCT VFR BLD CALC: 23.4 % — LOW (ref 37–47)
HGB BLD-MCNC: 7.2 G/DL — LOW (ref 12–16)
IMM GRANULOCYTES NFR BLD AUTO: 0.5 % — HIGH (ref 0.1–0.3)
INTERPRETATION 24H UR IFE-IMP: SIGNIFICANT CHANGE UP
LYMPHOCYTES # BLD AUTO: 0.9 K/UL — LOW (ref 1.2–3.4)
LYMPHOCYTES # BLD AUTO: 13.8 % — LOW (ref 20.5–51.1)
MAGNESIUM SERPL-MCNC: 1.4 MG/DL — LOW (ref 1.8–2.4)
MCHC RBC-ENTMCNC: 27.7 PG — SIGNIFICANT CHANGE UP (ref 27–31)
MCHC RBC-ENTMCNC: 30.8 G/DL — LOW (ref 32–37)
MCV RBC AUTO: 90 FL — SIGNIFICANT CHANGE UP (ref 81–99)
MONOCYTES # BLD AUTO: 0.46 K/UL — SIGNIFICANT CHANGE UP (ref 0.1–0.6)
MONOCYTES NFR BLD AUTO: 7.1 % — SIGNIFICANT CHANGE UP (ref 1.7–9.3)
NEUTROPHILS # BLD AUTO: 5.08 K/UL — SIGNIFICANT CHANGE UP (ref 1.4–6.5)
NEUTROPHILS NFR BLD AUTO: 77.9 % — HIGH (ref 42.2–75.2)
NRBC BLD AUTO-RTO: 0 /100 WBCS — SIGNIFICANT CHANGE UP (ref 0–0)
PHOSPHATE SERPL-MCNC: 2.3 MG/DL — SIGNIFICANT CHANGE UP (ref 2.1–4.9)
PLATELET # BLD AUTO: 184 K/UL — SIGNIFICANT CHANGE UP (ref 130–400)
PMV BLD: 10.2 FL — SIGNIFICANT CHANGE UP (ref 7.4–10.4)
POTASSIUM SERPL-MCNC: 3.3 MMOL/L — LOW (ref 3.5–5)
POTASSIUM SERPL-SCNC: 3.3 MMOL/L — LOW (ref 3.5–5)
PROT SERPL-MCNC: 4.2 G/DL — LOW (ref 6–8)
RBC # BLD: 2.6 M/UL — LOW (ref 4.2–5.4)
RBC # FLD: 14.1 % — SIGNIFICANT CHANGE UP (ref 11.5–14.5)
SODIUM SERPL-SCNC: 148 MMOL/L — HIGH (ref 135–146)
WBC # BLD: 6.51 K/UL — SIGNIFICANT CHANGE UP (ref 4.8–10.8)
WBC # FLD AUTO: 6.51 K/UL — SIGNIFICANT CHANGE UP (ref 4.8–10.8)

## 2025-08-15 PROCEDURE — 71045 X-RAY EXAM CHEST 1 VIEW: CPT | Mod: 26

## 2025-08-15 PROCEDURE — 99233 SBSQ HOSP IP/OBS HIGH 50: CPT

## 2025-08-15 PROCEDURE — 99232 SBSQ HOSP IP/OBS MODERATE 35: CPT

## 2025-08-15 RX ORDER — AMPICILLIN SODIUM AND SULBACTAM SODIUM 1; .5 G/1; G/1
3 INJECTION, POWDER, FOR SOLUTION INTRAMUSCULAR; INTRAVENOUS EVERY 6 HOURS
Refills: 0 | Status: COMPLETED | OUTPATIENT
Start: 2025-08-15 | End: 2025-08-15

## 2025-08-15 RX ORDER — MAGNESIUM SULFATE 500 MG/ML
2 SYRINGE (ML) INJECTION ONCE
Refills: 0 | Status: COMPLETED | OUTPATIENT
Start: 2025-08-15 | End: 2025-08-15

## 2025-08-15 RX ADMIN — TOUCHLESS CARE ZINC OXIDE PROTECTANT 1 APPLICATION(S): 20; 25 SPRAY TOPICAL at 17:43

## 2025-08-15 RX ADMIN — Medication 40 MILLIGRAM(S): at 12:24

## 2025-08-15 RX ADMIN — CINACALCET 60 MILLIGRAM(S): 30 TABLET, FILM COATED ORAL at 17:45

## 2025-08-15 RX ADMIN — Medication 125 MILLIGRAM(S): at 17:44

## 2025-08-15 RX ADMIN — SODIUM CHLORIDE 100 MILLILITER(S): 9 INJECTION, SOLUTION INTRAVENOUS at 12:26

## 2025-08-15 RX ADMIN — Medication 25 GRAM(S): at 09:45

## 2025-08-15 RX ADMIN — Medication 400 MILLIGRAM(S): at 12:24

## 2025-08-15 RX ADMIN — Medication 400 MILLIGRAM(S): at 17:45

## 2025-08-15 RX ADMIN — IPRATROPIUM BROMIDE AND ALBUTEROL SULFATE 3 MILLILITER(S): .5; 2.5 SOLUTION RESPIRATORY (INHALATION) at 19:35

## 2025-08-15 RX ADMIN — Medication 125 MILLIGRAM(S): at 23:17

## 2025-08-15 RX ADMIN — Medication 15 MILLILITER(S): at 17:44

## 2025-08-15 RX ADMIN — Medication 125 MILLIGRAM(S): at 05:31

## 2025-08-15 RX ADMIN — IPRATROPIUM BROMIDE AND ALBUTEROL SULFATE 3 MILLILITER(S): .5; 2.5 SOLUTION RESPIRATORY (INHALATION) at 07:43

## 2025-08-15 RX ADMIN — AMPICILLIN SODIUM AND SULBACTAM SODIUM 200 GRAM(S): 1; .5 INJECTION, POWDER, FOR SOLUTION INTRAMUSCULAR; INTRAVENOUS at 05:30

## 2025-08-15 RX ADMIN — Medication 1 APPLICATION(S): at 12:38

## 2025-08-15 RX ADMIN — IPRATROPIUM BROMIDE AND ALBUTEROL SULFATE 3 MILLILITER(S): .5; 2.5 SOLUTION RESPIRATORY (INHALATION) at 02:25

## 2025-08-15 RX ADMIN — Medication 125 MILLIGRAM(S): at 12:24

## 2025-08-15 RX ADMIN — Medication 15 MILLILITER(S): at 05:30

## 2025-08-15 RX ADMIN — IPRATROPIUM BROMIDE AND ALBUTEROL SULFATE 3 MILLILITER(S): .5; 2.5 SOLUTION RESPIRATORY (INHALATION) at 13:58

## 2025-08-15 RX ADMIN — Medication 1 PACKET(S): at 12:25

## 2025-08-15 RX ADMIN — AMPICILLIN SODIUM AND SULBACTAM SODIUM 200 GRAM(S): 1; .5 INJECTION, POWDER, FOR SOLUTION INTRAMUSCULAR; INTRAVENOUS at 12:24

## 2025-08-15 RX ADMIN — Medication 1 PACKET(S): at 05:31

## 2025-08-15 RX ADMIN — SODIUM CHLORIDE 100 MILLILITER(S): 9 INJECTION, SOLUTION INTRAVENOUS at 21:09

## 2025-08-15 RX ADMIN — Medication 1 PACKET(S): at 17:44

## 2025-08-15 RX ADMIN — TOUCHLESS CARE ZINC OXIDE PROTECTANT 1 APPLICATION(S): 20; 25 SPRAY TOPICAL at 05:32

## 2025-08-15 RX ADMIN — ENOXAPARIN SODIUM 70 MILLIGRAM(S): 100 INJECTION SUBCUTANEOUS at 17:44

## 2025-08-15 RX ADMIN — CINACALCET 60 MILLIGRAM(S): 30 TABLET, FILM COATED ORAL at 05:31

## 2025-08-15 RX ADMIN — ENOXAPARIN SODIUM 70 MILLIGRAM(S): 100 INJECTION SUBCUTANEOUS at 05:30

## 2025-08-15 RX ADMIN — AMPICILLIN SODIUM AND SULBACTAM SODIUM 200 GRAM(S): 1; .5 INJECTION, POWDER, FOR SOLUTION INTRAMUSCULAR; INTRAVENOUS at 17:45

## 2025-08-15 RX ADMIN — Medication 400 MILLIGRAM(S): at 09:45

## 2025-08-15 RX ADMIN — Medication 1 APPLICATION(S): at 17:43

## 2025-08-15 RX ADMIN — Medication 1 APPLICATION(S): at 05:32

## 2025-08-15 RX ADMIN — Medication 1 PACKET(S): at 23:17

## 2025-08-15 RX ADMIN — Medication 40 MILLIEQUIVALENT(S): at 21:08

## 2025-08-16 LAB
ALBUMIN SERPL ELPH-MCNC: 2.1 G/DL — LOW (ref 3.5–5.2)
ALP SERPL-CCNC: 78 U/L — SIGNIFICANT CHANGE UP (ref 30–115)
ALT FLD-CCNC: 12 U/L — SIGNIFICANT CHANGE UP (ref 0–41)
ANION GAP SERPL CALC-SCNC: 6 MMOL/L — LOW (ref 7–14)
AST SERPL-CCNC: 12 U/L — SIGNIFICANT CHANGE UP (ref 0–41)
BASOPHILS # BLD AUTO: 0.01 K/UL — SIGNIFICANT CHANGE UP (ref 0–0.2)
BASOPHILS NFR BLD AUTO: 0.1 % — SIGNIFICANT CHANGE UP (ref 0–1)
BILIRUB SERPL-MCNC: <0.2 MG/DL — SIGNIFICANT CHANGE UP (ref 0.2–1.2)
BUN SERPL-MCNC: 22 MG/DL — HIGH (ref 10–20)
CALCIUM SERPL-MCNC: 10.1 MG/DL — SIGNIFICANT CHANGE UP (ref 8.4–10.5)
CHLORIDE SERPL-SCNC: 110 MMOL/L — SIGNIFICANT CHANGE UP (ref 98–110)
CO2 SERPL-SCNC: 28 MMOL/L — SIGNIFICANT CHANGE UP (ref 17–32)
CREAT SERPL-MCNC: 0.6 MG/DL — LOW (ref 0.7–1.5)
CULTURE RESULTS: SIGNIFICANT CHANGE UP
EGFR: 95 ML/MIN/1.73M2 — SIGNIFICANT CHANGE UP
EGFR: 95 ML/MIN/1.73M2 — SIGNIFICANT CHANGE UP
EOSINOPHIL # BLD AUTO: 0.08 K/UL — SIGNIFICANT CHANGE UP (ref 0–0.7)
EOSINOPHIL NFR BLD AUTO: 1.1 % — SIGNIFICANT CHANGE UP (ref 0–8)
GLUCOSE BLDC GLUCOMTR-MCNC: 109 MG/DL — HIGH (ref 70–99)
GLUCOSE BLDC GLUCOMTR-MCNC: 117 MG/DL — HIGH (ref 70–99)
GLUCOSE BLDC GLUCOMTR-MCNC: 132 MG/DL — HIGH (ref 70–99)
GLUCOSE BLDC GLUCOMTR-MCNC: 98 MG/DL — SIGNIFICANT CHANGE UP (ref 70–99)
GLUCOSE SERPL-MCNC: 106 MG/DL — HIGH (ref 70–99)
HCT VFR BLD CALC: 24 % — LOW (ref 37–47)
HGB BLD-MCNC: 7.4 G/DL — LOW (ref 12–16)
IMM GRANULOCYTES NFR BLD AUTO: 0.6 % — HIGH (ref 0.1–0.3)
LYMPHOCYTES # BLD AUTO: 1.16 K/UL — LOW (ref 1.2–3.4)
LYMPHOCYTES # BLD AUTO: 16 % — LOW (ref 20.5–51.1)
MAGNESIUM SERPL-MCNC: 1.3 MG/DL — LOW (ref 1.8–2.4)
MCHC RBC-ENTMCNC: 28.2 PG — SIGNIFICANT CHANGE UP (ref 27–31)
MCHC RBC-ENTMCNC: 30.8 G/DL — LOW (ref 32–37)
MCV RBC AUTO: 91.6 FL — SIGNIFICANT CHANGE UP (ref 81–99)
MONOCYTES # BLD AUTO: 0.49 K/UL — SIGNIFICANT CHANGE UP (ref 0.1–0.6)
MONOCYTES NFR BLD AUTO: 6.7 % — SIGNIFICANT CHANGE UP (ref 1.7–9.3)
NEUTROPHILS # BLD AUTO: 5.48 K/UL — SIGNIFICANT CHANGE UP (ref 1.4–6.5)
NEUTROPHILS NFR BLD AUTO: 75.5 % — HIGH (ref 42.2–75.2)
NRBC BLD AUTO-RTO: 0 /100 WBCS — SIGNIFICANT CHANGE UP (ref 0–0)
PHOSPHATE SERPL-MCNC: 1.7 MG/DL — LOW (ref 2.1–4.9)
PLATELET # BLD AUTO: 169 K/UL — SIGNIFICANT CHANGE UP (ref 130–400)
PMV BLD: 10.6 FL — HIGH (ref 7.4–10.4)
POTASSIUM SERPL-MCNC: 4.1 MMOL/L — SIGNIFICANT CHANGE UP (ref 3.5–5)
POTASSIUM SERPL-SCNC: 4.1 MMOL/L — SIGNIFICANT CHANGE UP (ref 3.5–5)
PROT SERPL-MCNC: 4.3 G/DL — LOW (ref 6–8)
RBC # BLD: 2.62 M/UL — LOW (ref 4.2–5.4)
RBC # FLD: 14 % — SIGNIFICANT CHANGE UP (ref 11.5–14.5)
SODIUM SERPL-SCNC: 144 MMOL/L — SIGNIFICANT CHANGE UP (ref 135–146)
SPECIMEN SOURCE: SIGNIFICANT CHANGE UP
WBC # BLD: 7.26 K/UL — SIGNIFICANT CHANGE UP (ref 4.8–10.8)
WBC # FLD AUTO: 7.26 K/UL — SIGNIFICANT CHANGE UP (ref 4.8–10.8)

## 2025-08-16 PROCEDURE — 99232 SBSQ HOSP IP/OBS MODERATE 35: CPT

## 2025-08-16 PROCEDURE — 99233 SBSQ HOSP IP/OBS HIGH 50: CPT | Mod: GC

## 2025-08-16 PROCEDURE — 86077 PHYS BLOOD BANK SERV XMATCH: CPT

## 2025-08-16 RX ORDER — SODIUM PHOSPHATE,DIBASIC DIHYD
15 POWDER (GRAM) MISCELLANEOUS ONCE
Refills: 0 | Status: COMPLETED | OUTPATIENT
Start: 2025-08-16 | End: 2025-08-16

## 2025-08-16 RX ORDER — MAGNESIUM SULFATE 500 MG/ML
2 SYRINGE (ML) INJECTION ONCE
Refills: 0 | Status: COMPLETED | OUTPATIENT
Start: 2025-08-16 | End: 2025-08-16

## 2025-08-16 RX ADMIN — ENOXAPARIN SODIUM 70 MILLIGRAM(S): 100 INJECTION SUBCUTANEOUS at 05:41

## 2025-08-16 RX ADMIN — Medication 1 APPLICATION(S): at 11:08

## 2025-08-16 RX ADMIN — Medication 1 PACKET(S): at 11:03

## 2025-08-16 RX ADMIN — Medication 1 PACKET(S): at 17:01

## 2025-08-16 RX ADMIN — Medication 1 APPLICATION(S): at 05:40

## 2025-08-16 RX ADMIN — TOUCHLESS CARE ZINC OXIDE PROTECTANT 1 APPLICATION(S): 20; 25 SPRAY TOPICAL at 05:40

## 2025-08-16 RX ADMIN — TOUCHLESS CARE ZINC OXIDE PROTECTANT 1 APPLICATION(S): 20; 25 SPRAY TOPICAL at 17:05

## 2025-08-16 RX ADMIN — Medication 125 MILLIGRAM(S): at 05:42

## 2025-08-16 RX ADMIN — Medication 1 APPLICATION(S): at 17:04

## 2025-08-16 RX ADMIN — Medication 63.75 MILLIMOLE(S): at 12:11

## 2025-08-16 RX ADMIN — Medication 125 MILLIGRAM(S): at 11:03

## 2025-08-16 RX ADMIN — Medication 400 MILLIGRAM(S): at 11:05

## 2025-08-16 RX ADMIN — IPRATROPIUM BROMIDE AND ALBUTEROL SULFATE 3 MILLILITER(S): .5; 2.5 SOLUTION RESPIRATORY (INHALATION) at 20:39

## 2025-08-16 RX ADMIN — Medication 400 MILLIGRAM(S): at 17:02

## 2025-08-16 RX ADMIN — Medication 15 MILLILITER(S): at 17:01

## 2025-08-16 RX ADMIN — Medication 125 MILLIGRAM(S): at 17:02

## 2025-08-16 RX ADMIN — IPRATROPIUM BROMIDE AND ALBUTEROL SULFATE 3 MILLILITER(S): .5; 2.5 SOLUTION RESPIRATORY (INHALATION) at 01:36

## 2025-08-16 RX ADMIN — IPRATROPIUM BROMIDE AND ALBUTEROL SULFATE 3 MILLILITER(S): .5; 2.5 SOLUTION RESPIRATORY (INHALATION) at 14:42

## 2025-08-16 RX ADMIN — Medication 25 GRAM(S): at 09:47

## 2025-08-16 RX ADMIN — Medication 15 MILLILITER(S): at 05:41

## 2025-08-16 RX ADMIN — ENOXAPARIN SODIUM 70 MILLIGRAM(S): 100 INJECTION SUBCUTANEOUS at 17:01

## 2025-08-16 RX ADMIN — Medication 400 MILLIGRAM(S): at 08:04

## 2025-08-16 RX ADMIN — Medication 40 MILLIGRAM(S): at 11:03

## 2025-08-16 RX ADMIN — CINACALCET 60 MILLIGRAM(S): 30 TABLET, FILM COATED ORAL at 05:41

## 2025-08-16 RX ADMIN — SODIUM CHLORIDE 100 MILLILITER(S): 9 INJECTION, SOLUTION INTRAVENOUS at 08:07

## 2025-08-16 RX ADMIN — IPRATROPIUM BROMIDE AND ALBUTEROL SULFATE 3 MILLILITER(S): .5; 2.5 SOLUTION RESPIRATORY (INHALATION) at 08:24

## 2025-08-16 RX ADMIN — Medication 1 PACKET(S): at 05:41

## 2025-08-17 DIAGNOSIS — E11.9 TYPE 2 DIABETES MELLITUS WITHOUT COMPLICATIONS: ICD-10-CM

## 2025-08-17 DIAGNOSIS — Z98.890 OTHER SPECIFIED POSTPROCEDURAL STATES: ICD-10-CM

## 2025-08-17 DIAGNOSIS — I48.20 CHRONIC ATRIAL FIBRILLATION, UNSPECIFIED: ICD-10-CM

## 2025-08-17 DIAGNOSIS — E83.52 HYPERCALCEMIA: ICD-10-CM

## 2025-08-17 DIAGNOSIS — I95.9 HYPOTENSION, UNSPECIFIED: ICD-10-CM

## 2025-08-17 DIAGNOSIS — I10 ESSENTIAL (PRIMARY) HYPERTENSION: ICD-10-CM

## 2025-08-17 DIAGNOSIS — E87.0 HYPEROSMOLALITY AND HYPERNATREMIA: ICD-10-CM

## 2025-08-17 DIAGNOSIS — Z79.899 OTHER LONG TERM (CURRENT) DRUG THERAPY: ICD-10-CM

## 2025-08-17 LAB
ALBUMIN SERPL ELPH-MCNC: 2.1 G/DL — LOW (ref 3.5–5.2)
ALP SERPL-CCNC: 92 U/L — SIGNIFICANT CHANGE UP (ref 30–115)
ALT FLD-CCNC: 12 U/L — SIGNIFICANT CHANGE UP (ref 0–41)
ANION GAP SERPL CALC-SCNC: 7 MMOL/L — SIGNIFICANT CHANGE UP (ref 7–14)
AST SERPL-CCNC: 12 U/L — SIGNIFICANT CHANGE UP (ref 0–41)
BILIRUB SERPL-MCNC: 0.2 MG/DL — SIGNIFICANT CHANGE UP (ref 0.2–1.2)
BUN SERPL-MCNC: 21 MG/DL — HIGH (ref 10–20)
CALCIUM SERPL-MCNC: 9.3 MG/DL — SIGNIFICANT CHANGE UP (ref 8.4–10.5)
CHLORIDE SERPL-SCNC: 112 MMOL/L — HIGH (ref 98–110)
CO2 SERPL-SCNC: 27 MMOL/L — SIGNIFICANT CHANGE UP (ref 17–32)
CREAT SERPL-MCNC: 0.6 MG/DL — LOW (ref 0.7–1.5)
EGFR: 95 ML/MIN/1.73M2 — SIGNIFICANT CHANGE UP
EGFR: 95 ML/MIN/1.73M2 — SIGNIFICANT CHANGE UP
GLUCOSE BLDC GLUCOMTR-MCNC: 110 MG/DL — HIGH (ref 70–99)
GLUCOSE BLDC GLUCOMTR-MCNC: 80 MG/DL — SIGNIFICANT CHANGE UP (ref 70–99)
GLUCOSE BLDC GLUCOMTR-MCNC: 81 MG/DL — SIGNIFICANT CHANGE UP (ref 70–99)
GLUCOSE SERPL-MCNC: 73 MG/DL — SIGNIFICANT CHANGE UP (ref 70–99)
HCT VFR BLD CALC: 22.7 % — LOW (ref 37–47)
HGB BLD-MCNC: 7.3 G/DL — LOW (ref 12–16)
MAGNESIUM SERPL-MCNC: 1.5 MG/DL — LOW (ref 1.8–2.4)
MCHC RBC-ENTMCNC: 28.5 PG — SIGNIFICANT CHANGE UP (ref 27–31)
MCHC RBC-ENTMCNC: 32.2 G/DL — SIGNIFICANT CHANGE UP (ref 32–37)
MCV RBC AUTO: 88.7 FL — SIGNIFICANT CHANGE UP (ref 81–99)
NRBC BLD AUTO-RTO: 0 /100 WBCS — SIGNIFICANT CHANGE UP (ref 0–0)
PLATELET # BLD AUTO: 182 K/UL — SIGNIFICANT CHANGE UP (ref 130–400)
PMV BLD: 11.1 FL — HIGH (ref 7.4–10.4)
POTASSIUM SERPL-MCNC: 3.7 MMOL/L — SIGNIFICANT CHANGE UP (ref 3.5–5)
POTASSIUM SERPL-SCNC: 3.7 MMOL/L — SIGNIFICANT CHANGE UP (ref 3.5–5)
PROT SERPL-MCNC: 4.2 G/DL — LOW (ref 6–8)
RBC # BLD: 2.56 M/UL — LOW (ref 4.2–5.4)
RBC # FLD: 13.7 % — SIGNIFICANT CHANGE UP (ref 11.5–14.5)
SODIUM SERPL-SCNC: 146 MMOL/L — SIGNIFICANT CHANGE UP (ref 135–146)
WBC # BLD: 6.4 K/UL — SIGNIFICANT CHANGE UP (ref 4.8–10.8)
WBC # FLD AUTO: 6.4 K/UL — SIGNIFICANT CHANGE UP (ref 4.8–10.8)

## 2025-08-17 PROCEDURE — 99232 SBSQ HOSP IP/OBS MODERATE 35: CPT | Mod: GC

## 2025-08-17 PROCEDURE — 99232 SBSQ HOSP IP/OBS MODERATE 35: CPT

## 2025-08-17 RX ORDER — MAGNESIUM SULFATE 500 MG/ML
2 SYRINGE (ML) INJECTION
Refills: 0 | Status: COMPLETED | OUTPATIENT
Start: 2025-08-17 | End: 2025-08-17

## 2025-08-17 RX ADMIN — Medication 125 MILLIGRAM(S): at 00:03

## 2025-08-17 RX ADMIN — Medication 1 PACKET(S): at 00:03

## 2025-08-17 RX ADMIN — Medication 400 MILLIGRAM(S): at 17:01

## 2025-08-17 RX ADMIN — Medication 1 PACKET(S): at 17:00

## 2025-08-17 RX ADMIN — TOUCHLESS CARE ZINC OXIDE PROTECTANT 1 APPLICATION(S): 20; 25 SPRAY TOPICAL at 17:01

## 2025-08-17 RX ADMIN — IPRATROPIUM BROMIDE AND ALBUTEROL SULFATE 3 MILLILITER(S): .5; 2.5 SOLUTION RESPIRATORY (INHALATION) at 01:34

## 2025-08-17 RX ADMIN — Medication 1 APPLICATION(S): at 17:01

## 2025-08-17 RX ADMIN — Medication 25 GRAM(S): at 15:44

## 2025-08-17 RX ADMIN — Medication 1 APPLICATION(S): at 11:09

## 2025-08-17 RX ADMIN — IPRATROPIUM BROMIDE AND ALBUTEROL SULFATE 3 MILLILITER(S): .5; 2.5 SOLUTION RESPIRATORY (INHALATION) at 20:03

## 2025-08-17 RX ADMIN — Medication 15 MILLILITER(S): at 17:01

## 2025-08-17 RX ADMIN — ENOXAPARIN SODIUM 70 MILLIGRAM(S): 100 INJECTION SUBCUTANEOUS at 17:16

## 2025-08-17 RX ADMIN — ENOXAPARIN SODIUM 70 MILLIGRAM(S): 100 INJECTION SUBCUTANEOUS at 05:33

## 2025-08-17 RX ADMIN — Medication 15 MILLILITER(S): at 05:31

## 2025-08-17 RX ADMIN — Medication 1 APPLICATION(S): at 05:43

## 2025-08-17 RX ADMIN — Medication 125 MILLIGRAM(S): at 17:00

## 2025-08-17 RX ADMIN — IPRATROPIUM BROMIDE AND ALBUTEROL SULFATE 3 MILLILITER(S): .5; 2.5 SOLUTION RESPIRATORY (INHALATION) at 13:48

## 2025-08-17 RX ADMIN — TOUCHLESS CARE ZINC OXIDE PROTECTANT 1 APPLICATION(S): 20; 25 SPRAY TOPICAL at 05:43

## 2025-08-17 RX ADMIN — Medication 25 GRAM(S): at 14:13

## 2025-08-17 RX ADMIN — IPRATROPIUM BROMIDE AND ALBUTEROL SULFATE 3 MILLILITER(S): .5; 2.5 SOLUTION RESPIRATORY (INHALATION) at 09:09

## 2025-08-18 LAB
% ALBUMIN: 39.6 % — SIGNIFICANT CHANGE UP
% ALPHA 1: 13.7 % — SIGNIFICANT CHANGE UP
% ALPHA 2: 17 % — SIGNIFICANT CHANGE UP
% BETA: 14.2 % — SIGNIFICANT CHANGE UP
% GAMMA: 15.5 % — SIGNIFICANT CHANGE UP
% M SPIKE: SIGNIFICANT CHANGE UP
ALBUMIN SERPL ELPH-MCNC: 1.7 G/DL — LOW (ref 3.6–5.5)
ALBUMIN SERPL ELPH-MCNC: 2.1 G/DL — LOW (ref 3.5–5.2)
ALBUMIN/GLOB SERPL ELPH: 0.7 RATIO — SIGNIFICANT CHANGE UP
ALP SERPL-CCNC: 93 U/L — SIGNIFICANT CHANGE UP (ref 30–115)
ALPHA1 GLOB SERPL ELPH-MCNC: 0.6 G/DL — HIGH (ref 0.1–0.4)
ALPHA2 GLOB SERPL ELPH-MCNC: 0.7 G/DL — SIGNIFICANT CHANGE UP (ref 0.5–1)
ALT FLD-CCNC: 12 U/L — SIGNIFICANT CHANGE UP (ref 0–41)
ANION GAP SERPL CALC-SCNC: 7 MMOL/L — SIGNIFICANT CHANGE UP (ref 7–14)
AST SERPL-CCNC: 11 U/L — SIGNIFICANT CHANGE UP (ref 0–41)
B-GLOBULIN SERPL ELPH-MCNC: 0.6 G/DL — SIGNIFICANT CHANGE UP (ref 0.5–1)
BASOPHILS # BLD AUTO: 0 K/UL — SIGNIFICANT CHANGE UP (ref 0–0.2)
BASOPHILS NFR BLD AUTO: 0 % — SIGNIFICANT CHANGE UP (ref 0–1)
BILIRUB SERPL-MCNC: <0.2 MG/DL — SIGNIFICANT CHANGE UP (ref 0.2–1.2)
BUN SERPL-MCNC: 21 MG/DL — HIGH (ref 10–20)
CALCIUM SERPL-MCNC: 9.4 MG/DL — SIGNIFICANT CHANGE UP (ref 8.4–10.5)
CHLORIDE SERPL-SCNC: 115 MMOL/L — HIGH (ref 98–110)
CO2 SERPL-SCNC: 26 MMOL/L — SIGNIFICANT CHANGE UP (ref 17–32)
CREAT SERPL-MCNC: 0.6 MG/DL — LOW (ref 0.7–1.5)
EGFR: 95 ML/MIN/1.73M2 — SIGNIFICANT CHANGE UP
EGFR: 95 ML/MIN/1.73M2 — SIGNIFICANT CHANGE UP
EOSINOPHIL # BLD AUTO: 0 K/UL — SIGNIFICANT CHANGE UP (ref 0–0.7)
EOSINOPHIL NFR BLD AUTO: 0 % — SIGNIFICANT CHANGE UP (ref 0–8)
GAMMA GLOBULIN: 0.7 G/DL — SIGNIFICANT CHANGE UP (ref 0.6–1.6)
GLUCOSE BLDC GLUCOMTR-MCNC: 108 MG/DL — HIGH (ref 70–99)
GLUCOSE BLDC GLUCOMTR-MCNC: 125 MG/DL — HIGH (ref 70–99)
GLUCOSE BLDC GLUCOMTR-MCNC: 96 MG/DL — SIGNIFICANT CHANGE UP (ref 70–99)
GLUCOSE BLDC GLUCOMTR-MCNC: 98 MG/DL — SIGNIFICANT CHANGE UP (ref 70–99)
GLUCOSE SERPL-MCNC: 99 MG/DL — SIGNIFICANT CHANGE UP (ref 70–99)
HCT VFR BLD CALC: 21.8 % — LOW (ref 37–47)
HCT VFR BLD CALC: 27.7 % — LOW (ref 37–47)
HGB BLD-MCNC: 6.8 G/DL — CRITICAL LOW (ref 12–16)
HGB BLD-MCNC: 8.6 G/DL — LOW (ref 12–16)
INTERPRETATION SERPL IFE-IMP: SIGNIFICANT CHANGE UP
LYMPHOCYTES # BLD AUTO: 0.77 K/UL — LOW (ref 1.2–3.4)
LYMPHOCYTES # BLD AUTO: 10.4 % — LOW (ref 20.5–51.1)
M-SPIKE: SIGNIFICANT CHANGE UP (ref 0–0)
MAGNESIUM SERPL-MCNC: 2.1 MG/DL — SIGNIFICANT CHANGE UP (ref 1.8–2.4)
MCHC RBC-ENTMCNC: 27.6 PG — SIGNIFICANT CHANGE UP (ref 27–31)
MCHC RBC-ENTMCNC: 28.2 PG — SIGNIFICANT CHANGE UP (ref 27–31)
MCHC RBC-ENTMCNC: 31 G/DL — LOW (ref 32–37)
MCHC RBC-ENTMCNC: 31.2 G/DL — LOW (ref 32–37)
MCV RBC AUTO: 88.6 FL — SIGNIFICANT CHANGE UP (ref 81–99)
MCV RBC AUTO: 90.8 FL — SIGNIFICANT CHANGE UP (ref 81–99)
MONOCYTES # BLD AUTO: 0.19 K/UL — SIGNIFICANT CHANGE UP (ref 0.1–0.6)
MONOCYTES NFR BLD AUTO: 2.6 % — SIGNIFICANT CHANGE UP (ref 1.7–9.3)
NEUTROPHILS # BLD AUTO: 6.46 K/UL — SIGNIFICANT CHANGE UP (ref 1.4–6.5)
NEUTROPHILS NFR BLD AUTO: 86.1 % — HIGH (ref 42.2–75.2)
NRBC BLD AUTO-RTO: 0 /100 WBCS — SIGNIFICANT CHANGE UP (ref 0–0)
PHOSPHATE SERPL-MCNC: 2.1 MG/DL — SIGNIFICANT CHANGE UP (ref 2.1–4.9)
PLATELET # BLD AUTO: 187 K/UL — SIGNIFICANT CHANGE UP (ref 130–400)
PLATELET # BLD AUTO: 208 K/UL — SIGNIFICANT CHANGE UP (ref 130–400)
PMV BLD: 10.7 FL — HIGH (ref 7.4–10.4)
PMV BLD: 11.4 FL — HIGH (ref 7.4–10.4)
POTASSIUM SERPL-MCNC: 3.7 MMOL/L — SIGNIFICANT CHANGE UP (ref 3.5–5)
POTASSIUM SERPL-SCNC: 3.7 MMOL/L — SIGNIFICANT CHANGE UP (ref 3.5–5)
PROT PATTERN SERPL ELPH-IMP: SIGNIFICANT CHANGE UP
PROT SERPL-MCNC: 4.3 G/DL — LOW (ref 6–8)
PROT SERPL-MCNC: 4.3 G/DL — LOW (ref 6–8.3)
PTH RELATED PROT SERPL-MCNC: <2 PMOL/L — SIGNIFICANT CHANGE UP
RBC # BLD: 2.46 M/UL — LOW (ref 4.2–5.4)
RBC # BLD: 3.05 M/UL — LOW (ref 4.2–5.4)
RBC # FLD: 13.6 % — SIGNIFICANT CHANGE UP (ref 11.5–14.5)
RBC # FLD: 13.7 % — SIGNIFICANT CHANGE UP (ref 11.5–14.5)
SODIUM SERPL-SCNC: 148 MMOL/L — HIGH (ref 135–146)
WBC # BLD: 7.42 K/UL — SIGNIFICANT CHANGE UP (ref 4.8–10.8)
WBC # BLD: 7.74 K/UL — SIGNIFICANT CHANGE UP (ref 4.8–10.8)
WBC # FLD AUTO: 7.42 K/UL — SIGNIFICANT CHANGE UP (ref 4.8–10.8)
WBC # FLD AUTO: 7.74 K/UL — SIGNIFICANT CHANGE UP (ref 4.8–10.8)

## 2025-08-18 PROCEDURE — 99232 SBSQ HOSP IP/OBS MODERATE 35: CPT

## 2025-08-18 PROCEDURE — 99233 SBSQ HOSP IP/OBS HIGH 50: CPT

## 2025-08-18 RX ORDER — SODIUM CHLORIDE 9 G/1000ML
1000 INJECTION, SOLUTION INTRAVENOUS
Refills: 0 | Status: DISCONTINUED | OUTPATIENT
Start: 2025-08-18 | End: 2025-09-05

## 2025-08-18 RX ADMIN — Medication 125 MILLIGRAM(S): at 11:43

## 2025-08-18 RX ADMIN — Medication 1 PACKET(S): at 17:14

## 2025-08-18 RX ADMIN — TOUCHLESS CARE ZINC OXIDE PROTECTANT 1 APPLICATION(S): 20; 25 SPRAY TOPICAL at 06:02

## 2025-08-18 RX ADMIN — IPRATROPIUM BROMIDE AND ALBUTEROL SULFATE 3 MILLILITER(S): .5; 2.5 SOLUTION RESPIRATORY (INHALATION) at 13:15

## 2025-08-18 RX ADMIN — Medication 125 MILLIGRAM(S): at 17:14

## 2025-08-18 RX ADMIN — ENOXAPARIN SODIUM 70 MILLIGRAM(S): 100 INJECTION SUBCUTANEOUS at 06:03

## 2025-08-18 RX ADMIN — Medication 400 MILLIGRAM(S): at 08:32

## 2025-08-18 RX ADMIN — TOUCHLESS CARE ZINC OXIDE PROTECTANT 1 APPLICATION(S): 20; 25 SPRAY TOPICAL at 17:16

## 2025-08-18 RX ADMIN — Medication 1 APPLICATION(S): at 17:16

## 2025-08-18 RX ADMIN — Medication 1 PACKET(S): at 00:00

## 2025-08-18 RX ADMIN — Medication 15 MILLILITER(S): at 17:14

## 2025-08-18 RX ADMIN — Medication 40 MILLIEQUIVALENT(S): at 11:43

## 2025-08-18 RX ADMIN — Medication 125 MILLIGRAM(S): at 06:02

## 2025-08-18 RX ADMIN — Medication 1 PACKET(S): at 06:02

## 2025-08-18 RX ADMIN — IPRATROPIUM BROMIDE AND ALBUTEROL SULFATE 3 MILLILITER(S): .5; 2.5 SOLUTION RESPIRATORY (INHALATION) at 07:51

## 2025-08-18 RX ADMIN — Medication 1 PACKET(S): at 11:43

## 2025-08-18 RX ADMIN — Medication 400 MILLIGRAM(S): at 11:42

## 2025-08-18 RX ADMIN — Medication 40 MILLIGRAM(S): at 11:43

## 2025-08-18 RX ADMIN — IPRATROPIUM BROMIDE AND ALBUTEROL SULFATE 3 MILLILITER(S): .5; 2.5 SOLUTION RESPIRATORY (INHALATION) at 01:26

## 2025-08-18 RX ADMIN — IPRATROPIUM BROMIDE AND ALBUTEROL SULFATE 3 MILLILITER(S): .5; 2.5 SOLUTION RESPIRATORY (INHALATION) at 19:45

## 2025-08-18 RX ADMIN — Medication 400 MILLIGRAM(S): at 17:15

## 2025-08-18 RX ADMIN — ENOXAPARIN SODIUM 70 MILLIGRAM(S): 100 INJECTION SUBCUTANEOUS at 17:14

## 2025-08-18 RX ADMIN — Medication 1 APPLICATION(S): at 06:02

## 2025-08-18 RX ADMIN — Medication 125 MILLIGRAM(S): at 00:00

## 2025-08-18 RX ADMIN — Medication 15 MILLILITER(S): at 06:02

## 2025-08-19 LAB
ANION GAP SERPL CALC-SCNC: 8 MMOL/L — SIGNIFICANT CHANGE UP (ref 7–14)
BUN SERPL-MCNC: 19 MG/DL — SIGNIFICANT CHANGE UP (ref 10–20)
CALCIUM SERPL-MCNC: 9.3 MG/DL — SIGNIFICANT CHANGE UP (ref 8.4–10.5)
CHLORIDE SERPL-SCNC: 114 MMOL/L — HIGH (ref 98–110)
CO2 SERPL-SCNC: 25 MMOL/L — SIGNIFICANT CHANGE UP (ref 17–32)
CREAT SERPL-MCNC: 0.6 MG/DL — LOW (ref 0.7–1.5)
EGFR: 95 ML/MIN/1.73M2 — SIGNIFICANT CHANGE UP
EGFR: 95 ML/MIN/1.73M2 — SIGNIFICANT CHANGE UP
GLUCOSE BLDC GLUCOMTR-MCNC: 102 MG/DL — HIGH (ref 70–99)
GLUCOSE BLDC GLUCOMTR-MCNC: 103 MG/DL — HIGH (ref 70–99)
GLUCOSE BLDC GLUCOMTR-MCNC: 109 MG/DL — HIGH (ref 70–99)
GLUCOSE BLDC GLUCOMTR-MCNC: 72 MG/DL — SIGNIFICANT CHANGE UP (ref 70–99)
GLUCOSE SERPL-MCNC: 84 MG/DL — SIGNIFICANT CHANGE UP (ref 70–99)
HCT VFR BLD CALC: 26.5 % — LOW (ref 37–47)
HGB BLD-MCNC: 8.4 G/DL — LOW (ref 12–16)
MAGNESIUM SERPL-MCNC: 1.7 MG/DL — LOW (ref 1.8–2.4)
MCHC RBC-ENTMCNC: 28.6 PG — SIGNIFICANT CHANGE UP (ref 27–31)
MCHC RBC-ENTMCNC: 31.7 G/DL — LOW (ref 32–37)
MCV RBC AUTO: 90.1 FL — SIGNIFICANT CHANGE UP (ref 81–99)
NRBC BLD AUTO-RTO: 0 /100 WBCS — SIGNIFICANT CHANGE UP (ref 0–0)
PLATELET # BLD AUTO: 217 K/UL — SIGNIFICANT CHANGE UP (ref 130–400)
PMV BLD: 11 FL — HIGH (ref 7.4–10.4)
POTASSIUM SERPL-MCNC: 4.5 MMOL/L — SIGNIFICANT CHANGE UP (ref 3.5–5)
POTASSIUM SERPL-SCNC: 4.5 MMOL/L — SIGNIFICANT CHANGE UP (ref 3.5–5)
RBC # BLD: 2.94 M/UL — LOW (ref 4.2–5.4)
RBC # FLD: 13.7 % — SIGNIFICANT CHANGE UP (ref 11.5–14.5)
SODIUM SERPL-SCNC: 147 MMOL/L — HIGH (ref 135–146)
WBC # BLD: 7.33 K/UL — SIGNIFICANT CHANGE UP (ref 4.8–10.8)
WBC # FLD AUTO: 7.33 K/UL — SIGNIFICANT CHANGE UP (ref 4.8–10.8)

## 2025-08-19 PROCEDURE — 99232 SBSQ HOSP IP/OBS MODERATE 35: CPT

## 2025-08-19 PROCEDURE — 99233 SBSQ HOSP IP/OBS HIGH 50: CPT

## 2025-08-19 RX ORDER — MAGNESIUM SULFATE 500 MG/ML
1 SYRINGE (ML) INJECTION ONCE
Refills: 0 | Status: COMPLETED | OUTPATIENT
Start: 2025-08-19 | End: 2025-08-19

## 2025-08-19 RX ADMIN — IPRATROPIUM BROMIDE AND ALBUTEROL SULFATE 3 MILLILITER(S): .5; 2.5 SOLUTION RESPIRATORY (INHALATION) at 01:06

## 2025-08-19 RX ADMIN — Medication 400 MILLIGRAM(S): at 11:18

## 2025-08-19 RX ADMIN — Medication 15 MILLILITER(S): at 06:56

## 2025-08-19 RX ADMIN — Medication 400 MILLIGRAM(S): at 17:02

## 2025-08-19 RX ADMIN — TOUCHLESS CARE ZINC OXIDE PROTECTANT 1 APPLICATION(S): 20; 25 SPRAY TOPICAL at 06:57

## 2025-08-19 RX ADMIN — Medication 100 GRAM(S): at 07:31

## 2025-08-19 RX ADMIN — Medication 1 APPLICATION(S): at 06:57

## 2025-08-19 RX ADMIN — Medication 125 MILLIGRAM(S): at 06:42

## 2025-08-19 RX ADMIN — Medication 125 MILLIGRAM(S): at 17:03

## 2025-08-19 RX ADMIN — IPRATROPIUM BROMIDE AND ALBUTEROL SULFATE 3 MILLILITER(S): .5; 2.5 SOLUTION RESPIRATORY (INHALATION) at 13:38

## 2025-08-19 RX ADMIN — Medication 1 PACKET(S): at 06:42

## 2025-08-19 RX ADMIN — Medication 400 MILLIGRAM(S): at 07:31

## 2025-08-19 RX ADMIN — Medication 125 MILLIGRAM(S): at 11:09

## 2025-08-19 RX ADMIN — IPRATROPIUM BROMIDE AND ALBUTEROL SULFATE 3 MILLILITER(S): .5; 2.5 SOLUTION RESPIRATORY (INHALATION) at 07:33

## 2025-08-19 RX ADMIN — Medication 1 APPLICATION(S): at 17:03

## 2025-08-19 RX ADMIN — Medication 125 MILLIGRAM(S): at 01:01

## 2025-08-19 RX ADMIN — Medication 15 MILLILITER(S): at 17:03

## 2025-08-19 RX ADMIN — SODIUM CHLORIDE 75 MILLILITER(S): 9 INJECTION, SOLUTION INTRAVENOUS at 11:28

## 2025-08-19 RX ADMIN — Medication 1 PACKET(S): at 01:01

## 2025-08-19 RX ADMIN — Medication 40 MILLIGRAM(S): at 11:09

## 2025-08-19 RX ADMIN — Medication 1 PACKET(S): at 17:03

## 2025-08-19 RX ADMIN — ENOXAPARIN SODIUM 70 MILLIGRAM(S): 100 INJECTION SUBCUTANEOUS at 06:56

## 2025-08-19 RX ADMIN — IPRATROPIUM BROMIDE AND ALBUTEROL SULFATE 3 MILLILITER(S): .5; 2.5 SOLUTION RESPIRATORY (INHALATION) at 20:07

## 2025-08-19 RX ADMIN — Medication 1 APPLICATION(S): at 11:18

## 2025-08-19 RX ADMIN — ENOXAPARIN SODIUM 70 MILLIGRAM(S): 100 INJECTION SUBCUTANEOUS at 17:03

## 2025-08-19 RX ADMIN — Medication 1 PACKET(S): at 11:09

## 2025-08-19 RX ADMIN — TOUCHLESS CARE ZINC OXIDE PROTECTANT 1 APPLICATION(S): 20; 25 SPRAY TOPICAL at 17:03

## 2025-08-20 LAB
ALBUMIN SERPL ELPH-MCNC: 2.1 G/DL — LOW (ref 3.5–5.2)
ALP SERPL-CCNC: 112 U/L — SIGNIFICANT CHANGE UP (ref 30–115)
ALT FLD-CCNC: 11 U/L — SIGNIFICANT CHANGE UP (ref 0–41)
ANION GAP SERPL CALC-SCNC: 8 MMOL/L — SIGNIFICANT CHANGE UP (ref 7–14)
AST SERPL-CCNC: 14 U/L — SIGNIFICANT CHANGE UP (ref 0–41)
BASOPHILS # BLD AUTO: 0.02 K/UL — SIGNIFICANT CHANGE UP (ref 0–0.2)
BASOPHILS NFR BLD AUTO: 0.3 % — SIGNIFICANT CHANGE UP (ref 0–1)
BILIRUB SERPL-MCNC: 0.3 MG/DL — SIGNIFICANT CHANGE UP (ref 0.2–1.2)
BUN SERPL-MCNC: 18 MG/DL — SIGNIFICANT CHANGE UP (ref 10–20)
CALCIUM SERPL-MCNC: 9.1 MG/DL — SIGNIFICANT CHANGE UP (ref 8.4–10.5)
CHLORIDE SERPL-SCNC: 110 MMOL/L — SIGNIFICANT CHANGE UP (ref 98–110)
CO2 SERPL-SCNC: 22 MMOL/L — SIGNIFICANT CHANGE UP (ref 17–32)
CREAT SERPL-MCNC: 0.6 MG/DL — LOW (ref 0.7–1.5)
EGFR: 95 ML/MIN/1.73M2 — SIGNIFICANT CHANGE UP
EGFR: 95 ML/MIN/1.73M2 — SIGNIFICANT CHANGE UP
EOSINOPHIL # BLD AUTO: 0.08 K/UL — SIGNIFICANT CHANGE UP (ref 0–0.7)
EOSINOPHIL NFR BLD AUTO: 1.3 % — SIGNIFICANT CHANGE UP (ref 0–8)
GLUCOSE BLDC GLUCOMTR-MCNC: 105 MG/DL — HIGH (ref 70–99)
GLUCOSE BLDC GLUCOMTR-MCNC: 106 MG/DL — HIGH (ref 70–99)
GLUCOSE BLDC GLUCOMTR-MCNC: 50 MG/DL — CRITICAL LOW (ref 70–99)
GLUCOSE BLDC GLUCOMTR-MCNC: 89 MG/DL — SIGNIFICANT CHANGE UP (ref 70–99)
GLUCOSE BLDC GLUCOMTR-MCNC: 90 MG/DL — SIGNIFICANT CHANGE UP (ref 70–99)
GLUCOSE SERPL-MCNC: 88 MG/DL — SIGNIFICANT CHANGE UP (ref 70–99)
HCT VFR BLD CALC: 25.7 % — LOW (ref 37–47)
HGB BLD-MCNC: 7.8 G/DL — LOW (ref 12–16)
IMM GRANULOCYTES NFR BLD AUTO: 0.6 % — HIGH (ref 0.1–0.3)
LYMPHOCYTES # BLD AUTO: 1.25 K/UL — SIGNIFICANT CHANGE UP (ref 1.2–3.4)
LYMPHOCYTES # BLD AUTO: 20.1 % — LOW (ref 20.5–51.1)
MAGNESIUM SERPL-MCNC: 1.6 MG/DL — LOW (ref 1.8–2.4)
MCHC RBC-ENTMCNC: 28.6 PG — SIGNIFICANT CHANGE UP (ref 27–31)
MCHC RBC-ENTMCNC: 30.4 G/DL — LOW (ref 32–37)
MCV RBC AUTO: 94.1 FL — SIGNIFICANT CHANGE UP (ref 81–99)
MONOCYTES # BLD AUTO: 0.46 K/UL — SIGNIFICANT CHANGE UP (ref 0.1–0.6)
MONOCYTES NFR BLD AUTO: 7.4 % — SIGNIFICANT CHANGE UP (ref 1.7–9.3)
NEUTROPHILS # BLD AUTO: 4.36 K/UL — SIGNIFICANT CHANGE UP (ref 1.4–6.5)
NEUTROPHILS NFR BLD AUTO: 70.3 % — SIGNIFICANT CHANGE UP (ref 42.2–75.2)
NRBC BLD AUTO-RTO: 0 /100 WBCS — SIGNIFICANT CHANGE UP (ref 0–0)
PLATELET # BLD AUTO: 189 K/UL — SIGNIFICANT CHANGE UP (ref 130–400)
PMV BLD: 12.4 FL — HIGH (ref 7.4–10.4)
POTASSIUM SERPL-MCNC: 4.9 MMOL/L — SIGNIFICANT CHANGE UP (ref 3.5–5)
POTASSIUM SERPL-SCNC: 4.9 MMOL/L — SIGNIFICANT CHANGE UP (ref 3.5–5)
PROT SERPL-MCNC: 4.3 G/DL — LOW (ref 6–8)
RBC # BLD: 2.73 M/UL — LOW (ref 4.2–5.4)
RBC # FLD: 14.1 % — SIGNIFICANT CHANGE UP (ref 11.5–14.5)
SODIUM SERPL-SCNC: 140 MMOL/L — SIGNIFICANT CHANGE UP (ref 135–146)
WBC # BLD: 6.21 K/UL — SIGNIFICANT CHANGE UP (ref 4.8–10.8)
WBC # FLD AUTO: 6.21 K/UL — SIGNIFICANT CHANGE UP (ref 4.8–10.8)

## 2025-08-20 PROCEDURE — 99233 SBSQ HOSP IP/OBS HIGH 50: CPT

## 2025-08-20 RX ORDER — MAGNESIUM SULFATE 500 MG/ML
2 SYRINGE (ML) INJECTION ONCE
Refills: 0 | Status: COMPLETED | OUTPATIENT
Start: 2025-08-20 | End: 2025-08-20

## 2025-08-20 RX ADMIN — Medication 125 MILLIGRAM(S): at 00:47

## 2025-08-20 RX ADMIN — Medication 1 APPLICATION(S): at 17:12

## 2025-08-20 RX ADMIN — Medication 15 MILLILITER(S): at 05:54

## 2025-08-20 RX ADMIN — Medication 125 MILLIGRAM(S): at 11:05

## 2025-08-20 RX ADMIN — Medication 25 GRAM(S): at 08:44

## 2025-08-20 RX ADMIN — ENOXAPARIN SODIUM 70 MILLIGRAM(S): 100 INJECTION SUBCUTANEOUS at 17:10

## 2025-08-20 RX ADMIN — Medication 1 APPLICATION(S): at 11:09

## 2025-08-20 RX ADMIN — IPRATROPIUM BROMIDE AND ALBUTEROL SULFATE 3 MILLILITER(S): .5; 2.5 SOLUTION RESPIRATORY (INHALATION) at 13:16

## 2025-08-20 RX ADMIN — IPRATROPIUM BROMIDE AND ALBUTEROL SULFATE 3 MILLILITER(S): .5; 2.5 SOLUTION RESPIRATORY (INHALATION) at 07:29

## 2025-08-20 RX ADMIN — Medication 125 MILLIGRAM(S): at 05:55

## 2025-08-20 RX ADMIN — Medication 1 PACKET(S): at 00:46

## 2025-08-20 RX ADMIN — Medication 400 MILLIGRAM(S): at 08:44

## 2025-08-20 RX ADMIN — TOUCHLESS CARE ZINC OXIDE PROTECTANT 1 APPLICATION(S): 20; 25 SPRAY TOPICAL at 05:54

## 2025-08-20 RX ADMIN — Medication 125 MILLIGRAM(S): at 17:11

## 2025-08-20 RX ADMIN — TOUCHLESS CARE ZINC OXIDE PROTECTANT 1 APPLICATION(S): 20; 25 SPRAY TOPICAL at 17:12

## 2025-08-20 RX ADMIN — Medication 400 MILLIGRAM(S): at 11:07

## 2025-08-20 RX ADMIN — Medication 15 MILLILITER(S): at 17:11

## 2025-08-20 RX ADMIN — ENOXAPARIN SODIUM 70 MILLIGRAM(S): 100 INJECTION SUBCUTANEOUS at 05:55

## 2025-08-20 RX ADMIN — IPRATROPIUM BROMIDE AND ALBUTEROL SULFATE 3 MILLILITER(S): .5; 2.5 SOLUTION RESPIRATORY (INHALATION) at 02:00

## 2025-08-20 RX ADMIN — Medication 40 MILLIGRAM(S): at 11:05

## 2025-08-20 RX ADMIN — Medication 1 PACKET(S): at 11:05

## 2025-08-20 RX ADMIN — Medication 1 PACKET(S): at 17:14

## 2025-08-20 RX ADMIN — Medication 1 PACKET(S): at 05:55

## 2025-08-20 RX ADMIN — Medication 400 MILLIGRAM(S): at 17:12

## 2025-08-20 RX ADMIN — IPRATROPIUM BROMIDE AND ALBUTEROL SULFATE 3 MILLILITER(S): .5; 2.5 SOLUTION RESPIRATORY (INHALATION) at 19:17

## 2025-08-21 LAB
ANION GAP SERPL CALC-SCNC: 6 MMOL/L — LOW (ref 7–14)
BASOPHILS # BLD AUTO: 0.01 K/UL — SIGNIFICANT CHANGE UP (ref 0–0.2)
BASOPHILS NFR BLD AUTO: 0.1 % — SIGNIFICANT CHANGE UP (ref 0–1)
BUN SERPL-MCNC: 16 MG/DL — SIGNIFICANT CHANGE UP (ref 10–20)
CALCIUM SERPL-MCNC: 9 MG/DL — SIGNIFICANT CHANGE UP (ref 8.4–10.5)
CHLORIDE SERPL-SCNC: 107 MMOL/L — SIGNIFICANT CHANGE UP (ref 98–110)
CO2 SERPL-SCNC: 24 MMOL/L — SIGNIFICANT CHANGE UP (ref 17–32)
CREAT SERPL-MCNC: 0.6 MG/DL — LOW (ref 0.7–1.5)
EGFR: 95 ML/MIN/1.73M2 — SIGNIFICANT CHANGE UP
EGFR: 95 ML/MIN/1.73M2 — SIGNIFICANT CHANGE UP
EOSINOPHIL # BLD AUTO: 0.04 K/UL — SIGNIFICANT CHANGE UP (ref 0–0.7)
EOSINOPHIL NFR BLD AUTO: 0.5 % — SIGNIFICANT CHANGE UP (ref 0–8)
GLUCOSE BLDC GLUCOMTR-MCNC: 102 MG/DL — HIGH (ref 70–99)
GLUCOSE BLDC GLUCOMTR-MCNC: 74 MG/DL — SIGNIFICANT CHANGE UP (ref 70–99)
GLUCOSE BLDC GLUCOMTR-MCNC: 90 MG/DL — SIGNIFICANT CHANGE UP (ref 70–99)
GLUCOSE BLDC GLUCOMTR-MCNC: 91 MG/DL — SIGNIFICANT CHANGE UP (ref 70–99)
GLUCOSE SERPL-MCNC: 92 MG/DL — SIGNIFICANT CHANGE UP (ref 70–99)
HCT VFR BLD CALC: 24.1 % — LOW (ref 37–47)
HGB BLD-MCNC: 7.6 G/DL — LOW (ref 12–16)
IMM GRANULOCYTES NFR BLD AUTO: 0.9 % — HIGH (ref 0.1–0.3)
LYMPHOCYTES # BLD AUTO: 0.74 K/UL — LOW (ref 1.2–3.4)
LYMPHOCYTES # BLD AUTO: 8.8 % — LOW (ref 20.5–51.1)
MAGNESIUM SERPL-MCNC: 1.5 MG/DL — LOW (ref 1.8–2.4)
MCHC RBC-ENTMCNC: 28.3 PG — SIGNIFICANT CHANGE UP (ref 27–31)
MCHC RBC-ENTMCNC: 31.5 G/DL — LOW (ref 32–37)
MCV RBC AUTO: 89.6 FL — SIGNIFICANT CHANGE UP (ref 81–99)
MONOCYTES # BLD AUTO: 0.63 K/UL — HIGH (ref 0.1–0.6)
MONOCYTES NFR BLD AUTO: 7.5 % — SIGNIFICANT CHANGE UP (ref 1.7–9.3)
NEUTROPHILS # BLD AUTO: 6.94 K/UL — HIGH (ref 1.4–6.5)
NEUTROPHILS NFR BLD AUTO: 82.2 % — HIGH (ref 42.2–75.2)
NRBC BLD AUTO-RTO: 0 /100 WBCS — SIGNIFICANT CHANGE UP (ref 0–0)
PLATELET # BLD AUTO: 254 K/UL — SIGNIFICANT CHANGE UP (ref 130–400)
PMV BLD: 11.1 FL — HIGH (ref 7.4–10.4)
POTASSIUM SERPL-MCNC: 4.5 MMOL/L — SIGNIFICANT CHANGE UP (ref 3.5–5)
POTASSIUM SERPL-SCNC: 4.5 MMOL/L — SIGNIFICANT CHANGE UP (ref 3.5–5)
RBC # BLD: 2.69 M/UL — LOW (ref 4.2–5.4)
RBC # FLD: 13.8 % — SIGNIFICANT CHANGE UP (ref 11.5–14.5)
SODIUM SERPL-SCNC: 137 MMOL/L — SIGNIFICANT CHANGE UP (ref 135–146)
WBC # BLD: 8.44 K/UL — SIGNIFICANT CHANGE UP (ref 4.8–10.8)
WBC # FLD AUTO: 8.44 K/UL — SIGNIFICANT CHANGE UP (ref 4.8–10.8)

## 2025-08-21 PROCEDURE — 99233 SBSQ HOSP IP/OBS HIGH 50: CPT

## 2025-08-21 PROCEDURE — 71045 X-RAY EXAM CHEST 1 VIEW: CPT | Mod: 26

## 2025-08-21 PROCEDURE — 99232 SBSQ HOSP IP/OBS MODERATE 35: CPT

## 2025-08-21 RX ORDER — VANCOMYCIN HCL IN 5 % DEXTROSE 1.5G/250ML
125 PLASTIC BAG, INJECTION (ML) INTRAVENOUS EVERY 6 HOURS
Refills: 0 | Status: COMPLETED | OUTPATIENT
Start: 2025-08-21 | End: 2025-08-29

## 2025-08-21 RX ORDER — ACETAMINOPHEN 500 MG/5ML
1000 LIQUID (ML) ORAL ONCE
Refills: 0 | Status: COMPLETED | OUTPATIENT
Start: 2025-08-21 | End: 2025-08-21

## 2025-08-21 RX ORDER — MAGNESIUM SULFATE 500 MG/ML
2 SYRINGE (ML) INJECTION
Refills: 0 | Status: COMPLETED | OUTPATIENT
Start: 2025-08-21 | End: 2025-08-21

## 2025-08-21 RX ADMIN — Medication 1 PACKET(S): at 00:04

## 2025-08-21 RX ADMIN — Medication 400 MILLIGRAM(S): at 23:45

## 2025-08-21 RX ADMIN — Medication 1 PACKET(S): at 18:26

## 2025-08-21 RX ADMIN — ENOXAPARIN SODIUM 70 MILLIGRAM(S): 100 INJECTION SUBCUTANEOUS at 18:06

## 2025-08-21 RX ADMIN — Medication 400 MILLIGRAM(S): at 18:07

## 2025-08-21 RX ADMIN — IPRATROPIUM BROMIDE AND ALBUTEROL SULFATE 3 MILLILITER(S): .5; 2.5 SOLUTION RESPIRATORY (INHALATION) at 20:07

## 2025-08-21 RX ADMIN — Medication 15 MILLILITER(S): at 18:07

## 2025-08-21 RX ADMIN — Medication 40 MILLIGRAM(S): at 12:37

## 2025-08-21 RX ADMIN — Medication 125 MILLIGRAM(S): at 00:32

## 2025-08-21 RX ADMIN — Medication 1 APPLICATION(S): at 12:37

## 2025-08-21 RX ADMIN — Medication 1 APPLICATION(S): at 18:15

## 2025-08-21 RX ADMIN — Medication 125 MILLIGRAM(S): at 12:36

## 2025-08-21 RX ADMIN — TOUCHLESS CARE ZINC OXIDE PROTECTANT 1 APPLICATION(S): 20; 25 SPRAY TOPICAL at 18:15

## 2025-08-21 RX ADMIN — Medication 1 PACKET(S): at 06:32

## 2025-08-21 RX ADMIN — ENOXAPARIN SODIUM 70 MILLIGRAM(S): 100 INJECTION SUBCUTANEOUS at 06:29

## 2025-08-21 RX ADMIN — Medication 1 PACKET(S): at 14:23

## 2025-08-21 RX ADMIN — Medication 125 MILLIGRAM(S): at 18:06

## 2025-08-21 RX ADMIN — Medication 25 GRAM(S): at 10:47

## 2025-08-21 RX ADMIN — Medication 1 APPLICATION(S): at 06:47

## 2025-08-21 RX ADMIN — IPRATROPIUM BROMIDE AND ALBUTEROL SULFATE 3 MILLILITER(S): .5; 2.5 SOLUTION RESPIRATORY (INHALATION) at 08:47

## 2025-08-21 RX ADMIN — Medication 400 MILLIGRAM(S): at 14:22

## 2025-08-21 RX ADMIN — Medication 25 GRAM(S): at 10:07

## 2025-08-21 RX ADMIN — IPRATROPIUM BROMIDE AND ALBUTEROL SULFATE 3 MILLILITER(S): .5; 2.5 SOLUTION RESPIRATORY (INHALATION) at 01:43

## 2025-08-21 RX ADMIN — TOUCHLESS CARE ZINC OXIDE PROTECTANT 1 APPLICATION(S): 20; 25 SPRAY TOPICAL at 06:47

## 2025-08-21 RX ADMIN — Medication 125 MILLIGRAM(S): at 06:49

## 2025-08-21 RX ADMIN — Medication 15 MILLILITER(S): at 06:48

## 2025-08-21 RX ADMIN — IPRATROPIUM BROMIDE AND ALBUTEROL SULFATE 3 MILLILITER(S): .5; 2.5 SOLUTION RESPIRATORY (INHALATION) at 14:40

## 2025-08-22 LAB
APPEARANCE UR: CLEAR — SIGNIFICANT CHANGE UP
BILIRUB UR-MCNC: NEGATIVE — SIGNIFICANT CHANGE UP
COLOR SPEC: YELLOW — SIGNIFICANT CHANGE UP
DIFF PNL FLD: NEGATIVE — SIGNIFICANT CHANGE UP
GLUCOSE BLDC GLUCOMTR-MCNC: 105 MG/DL — HIGH (ref 70–99)
GLUCOSE BLDC GLUCOMTR-MCNC: 153 MG/DL — HIGH (ref 70–99)
GLUCOSE BLDC GLUCOMTR-MCNC: 41 MG/DL — CRITICAL LOW (ref 70–99)
GLUCOSE BLDC GLUCOMTR-MCNC: 45 MG/DL — CRITICAL LOW (ref 70–99)
GLUCOSE BLDC GLUCOMTR-MCNC: 87 MG/DL — SIGNIFICANT CHANGE UP (ref 70–99)
GLUCOSE BLDC GLUCOMTR-MCNC: 88 MG/DL — SIGNIFICANT CHANGE UP (ref 70–99)
GLUCOSE UR QL: NEGATIVE MG/DL — SIGNIFICANT CHANGE UP
HPIV1 RNA SPEC QL NAA+PROBE: DETECTED
KETONES UR QL: NEGATIVE MG/DL — SIGNIFICANT CHANGE UP
LEUKOCYTE ESTERASE UR-ACNC: NEGATIVE — SIGNIFICANT CHANGE UP
NITRITE UR-MCNC: NEGATIVE — SIGNIFICANT CHANGE UP
PH UR: 6.5 — SIGNIFICANT CHANGE UP (ref 5–8)
PROT UR-MCNC: 30 MG/DL
PTH RELATED PROT SERPL-MCNC: <2 PMOL/L — SIGNIFICANT CHANGE UP
RAPID RVP RESULT: DETECTED
SARS-COV-2 RNA SPEC QL NAA+PROBE: SIGNIFICANT CHANGE UP
SP GR SPEC: 1.01 — SIGNIFICANT CHANGE UP (ref 1–1.03)
UROBILINOGEN FLD QL: 0.2 MG/DL — SIGNIFICANT CHANGE UP (ref 0.2–1)

## 2025-08-22 PROCEDURE — 74019 RADEX ABDOMEN 2 VIEWS: CPT | Mod: 26

## 2025-08-22 PROCEDURE — 99223 1ST HOSP IP/OBS HIGH 75: CPT

## 2025-08-22 PROCEDURE — 99233 SBSQ HOSP IP/OBS HIGH 50: CPT

## 2025-08-22 PROCEDURE — 99232 SBSQ HOSP IP/OBS MODERATE 35: CPT

## 2025-08-22 RX ORDER — SODIUM CHLORIDE 9 G/1000ML
1000 INJECTION, SOLUTION INTRAVENOUS ONCE
Refills: 0 | Status: COMPLETED | OUTPATIENT
Start: 2025-08-22 | End: 2025-08-22

## 2025-08-22 RX ORDER — DEXTROSE 50 % IN WATER 50 %
25 SYRINGE (ML) INTRAVENOUS ONCE
Refills: 0 | Status: COMPLETED | OUTPATIENT
Start: 2025-08-22 | End: 2025-08-22

## 2025-08-22 RX ADMIN — Medication 1 APPLICATION(S): at 05:21

## 2025-08-22 RX ADMIN — SODIUM CHLORIDE 75 MILLILITER(S): 9 INJECTION, SOLUTION INTRAVENOUS at 02:28

## 2025-08-22 RX ADMIN — Medication 15 MILLILITER(S): at 18:28

## 2025-08-22 RX ADMIN — Medication 1 APPLICATION(S): at 18:28

## 2025-08-22 RX ADMIN — TOUCHLESS CARE ZINC OXIDE PROTECTANT 1 APPLICATION(S): 20; 25 SPRAY TOPICAL at 18:28

## 2025-08-22 RX ADMIN — Medication 25 MILLILITER(S): at 11:27

## 2025-08-22 RX ADMIN — Medication 15 MILLILITER(S): at 05:21

## 2025-08-22 RX ADMIN — TOUCHLESS CARE ZINC OXIDE PROTECTANT 1 APPLICATION(S): 20; 25 SPRAY TOPICAL at 05:21

## 2025-08-22 RX ADMIN — IPRATROPIUM BROMIDE AND ALBUTEROL SULFATE 3 MILLILITER(S): .5; 2.5 SOLUTION RESPIRATORY (INHALATION) at 21:52

## 2025-08-22 RX ADMIN — IPRATROPIUM BROMIDE AND ALBUTEROL SULFATE 3 MILLILITER(S): .5; 2.5 SOLUTION RESPIRATORY (INHALATION) at 07:43

## 2025-08-22 RX ADMIN — Medication 1 APPLICATION(S): at 11:28

## 2025-08-22 RX ADMIN — SODIUM CHLORIDE 1000 MILLILITER(S): 9 INJECTION, SOLUTION INTRAVENOUS at 08:08

## 2025-08-22 RX ADMIN — ENOXAPARIN SODIUM 70 MILLIGRAM(S): 100 INJECTION SUBCUTANEOUS at 05:21

## 2025-08-22 RX ADMIN — IPRATROPIUM BROMIDE AND ALBUTEROL SULFATE 3 MILLILITER(S): .5; 2.5 SOLUTION RESPIRATORY (INHALATION) at 13:28

## 2025-08-22 RX ADMIN — IPRATROPIUM BROMIDE AND ALBUTEROL SULFATE 3 MILLILITER(S): .5; 2.5 SOLUTION RESPIRATORY (INHALATION) at 01:10

## 2025-08-22 RX ADMIN — ENOXAPARIN SODIUM 70 MILLIGRAM(S): 100 INJECTION SUBCUTANEOUS at 18:28

## 2025-08-23 LAB
ALBUMIN SERPL ELPH-MCNC: 1.8 G/DL — LOW (ref 3.5–5.2)
ALP SERPL-CCNC: 102 U/L — SIGNIFICANT CHANGE UP (ref 30–115)
ALT FLD-CCNC: 10 U/L — SIGNIFICANT CHANGE UP (ref 0–41)
ANION GAP SERPL CALC-SCNC: 9 MMOL/L — SIGNIFICANT CHANGE UP (ref 7–14)
APTT BLD: 44.8 SEC — HIGH (ref 27–39.2)
AST SERPL-CCNC: 10 U/L — SIGNIFICANT CHANGE UP (ref 0–41)
BASOPHILS # BLD AUTO: 0.01 K/UL — SIGNIFICANT CHANGE UP (ref 0–0.2)
BASOPHILS NFR BLD AUTO: 0.2 % — SIGNIFICANT CHANGE UP (ref 0–1)
BILIRUB SERPL-MCNC: 0.5 MG/DL — SIGNIFICANT CHANGE UP (ref 0.2–1.2)
BUN SERPL-MCNC: 13 MG/DL — SIGNIFICANT CHANGE UP (ref 10–20)
CALCIUM SERPL-MCNC: 9 MG/DL — SIGNIFICANT CHANGE UP (ref 8.4–10.5)
CHLORIDE SERPL-SCNC: 104 MMOL/L — SIGNIFICANT CHANGE UP (ref 98–110)
CO2 SERPL-SCNC: 22 MMOL/L — SIGNIFICANT CHANGE UP (ref 17–32)
CREAT SERPL-MCNC: 0.7 MG/DL — SIGNIFICANT CHANGE UP (ref 0.7–1.5)
EGFR: 92 ML/MIN/1.73M2 — SIGNIFICANT CHANGE UP
EGFR: 92 ML/MIN/1.73M2 — SIGNIFICANT CHANGE UP
EOSINOPHIL # BLD AUTO: 0.02 K/UL — SIGNIFICANT CHANGE UP (ref 0–0.7)
EOSINOPHIL NFR BLD AUTO: 0.3 % — SIGNIFICANT CHANGE UP (ref 0–8)
FERRITIN SERPL-MCNC: 205 NG/ML — SIGNIFICANT CHANGE UP (ref 13–330)
GLUCOSE BLDC GLUCOMTR-MCNC: 113 MG/DL — HIGH (ref 70–99)
GLUCOSE BLDC GLUCOMTR-MCNC: 78 MG/DL — SIGNIFICANT CHANGE UP (ref 70–99)
GLUCOSE BLDC GLUCOMTR-MCNC: 80 MG/DL — SIGNIFICANT CHANGE UP (ref 70–99)
GLUCOSE BLDC GLUCOMTR-MCNC: 97 MG/DL — SIGNIFICANT CHANGE UP (ref 70–99)
GLUCOSE SERPL-MCNC: 84 MG/DL — SIGNIFICANT CHANGE UP (ref 70–99)
HCT VFR BLD CALC: 20.2 % — LOW (ref 37–47)
HCT VFR BLD CALC: 25.1 % — LOW (ref 37–47)
HGB BLD-MCNC: 6.7 G/DL — CRITICAL LOW (ref 12–16)
HGB BLD-MCNC: 8.3 G/DL — LOW (ref 12–16)
IMM GRANULOCYTES NFR BLD AUTO: 0.5 % — HIGH (ref 0.1–0.3)
INR BLD: 1.31 RATIO — HIGH (ref 0.65–1.3)
IRON SATN MFR SERPL: <10 UG/DL — LOW (ref 35–150)
LYMPHOCYTES # BLD AUTO: 0.85 K/UL — LOW (ref 1.2–3.4)
LYMPHOCYTES # BLD AUTO: 12.9 % — LOW (ref 20.5–51.1)
MAGNESIUM SERPL-MCNC: 1.4 MG/DL — LOW (ref 1.8–2.4)
MCHC RBC-ENTMCNC: 28.5 PG — SIGNIFICANT CHANGE UP (ref 27–31)
MCHC RBC-ENTMCNC: 28.9 PG — SIGNIFICANT CHANGE UP (ref 27–31)
MCHC RBC-ENTMCNC: 33.1 G/DL — SIGNIFICANT CHANGE UP (ref 32–37)
MCHC RBC-ENTMCNC: 33.2 G/DL — SIGNIFICANT CHANGE UP (ref 32–37)
MCV RBC AUTO: 86.3 FL — SIGNIFICANT CHANGE UP (ref 81–99)
MCV RBC AUTO: 87.1 FL — SIGNIFICANT CHANGE UP (ref 81–99)
MONOCYTES # BLD AUTO: 0.51 K/UL — SIGNIFICANT CHANGE UP (ref 0.1–0.6)
MONOCYTES NFR BLD AUTO: 7.8 % — SIGNIFICANT CHANGE UP (ref 1.7–9.3)
NEUTROPHILS # BLD AUTO: 5.16 K/UL — SIGNIFICANT CHANGE UP (ref 1.4–6.5)
NEUTROPHILS NFR BLD AUTO: 78.3 % — HIGH (ref 42.2–75.2)
NRBC BLD AUTO-RTO: 0 /100 WBCS — SIGNIFICANT CHANGE UP (ref 0–0)
NRBC BLD AUTO-RTO: 0 /100 WBCS — SIGNIFICANT CHANGE UP (ref 0–0)
PLATELET # BLD AUTO: 232 K/UL — SIGNIFICANT CHANGE UP (ref 130–400)
PLATELET # BLD AUTO: 252 K/UL — SIGNIFICANT CHANGE UP (ref 130–400)
PMV BLD: 11.1 FL — HIGH (ref 7.4–10.4)
PMV BLD: 11.2 FL — HIGH (ref 7.4–10.4)
POTASSIUM SERPL-MCNC: 3.1 MMOL/L — LOW (ref 3.5–5)
POTASSIUM SERPL-SCNC: 3.1 MMOL/L — LOW (ref 3.5–5)
PROT SERPL-MCNC: 4.3 G/DL — LOW (ref 6–8)
PROTHROM AB SERPL-ACNC: 15.6 SEC — HIGH (ref 9.95–12.87)
RBC # BLD: 2.32 M/UL — LOW (ref 4.2–5.4)
RBC # BLD: 2.91 M/UL — LOW (ref 4.2–5.4)
RBC # FLD: 13.5 % — SIGNIFICANT CHANGE UP (ref 11.5–14.5)
RBC # FLD: 13.8 % — SIGNIFICANT CHANGE UP (ref 11.5–14.5)
SODIUM SERPL-SCNC: 135 MMOL/L — SIGNIFICANT CHANGE UP (ref 135–146)
TIBC SERPL-MCNC: <97 UG/DL — LOW (ref 220–430)
UIBC SERPL-MCNC: 87 UG/DL — LOW (ref 110–370)
WBC # BLD: 6.58 K/UL — SIGNIFICANT CHANGE UP (ref 4.8–10.8)
WBC # BLD: 6.86 K/UL — SIGNIFICANT CHANGE UP (ref 4.8–10.8)
WBC # FLD AUTO: 6.58 K/UL — SIGNIFICANT CHANGE UP (ref 4.8–10.8)
WBC # FLD AUTO: 6.86 K/UL — SIGNIFICANT CHANGE UP (ref 4.8–10.8)

## 2025-08-23 PROCEDURE — 99232 SBSQ HOSP IP/OBS MODERATE 35: CPT

## 2025-08-23 PROCEDURE — 99233 SBSQ HOSP IP/OBS HIGH 50: CPT

## 2025-08-23 RX ADMIN — Medication 400 MILLIGRAM(S): at 09:44

## 2025-08-23 RX ADMIN — Medication 1 APPLICATION(S): at 18:34

## 2025-08-23 RX ADMIN — Medication 50 MILLIEQUIVALENT(S): at 11:48

## 2025-08-23 RX ADMIN — Medication 40 MILLIGRAM(S): at 05:40

## 2025-08-23 RX ADMIN — IPRATROPIUM BROMIDE AND ALBUTEROL SULFATE 3 MILLILITER(S): .5; 2.5 SOLUTION RESPIRATORY (INHALATION) at 01:58

## 2025-08-23 RX ADMIN — Medication 40 MILLIGRAM(S): at 18:33

## 2025-08-23 RX ADMIN — IPRATROPIUM BROMIDE AND ALBUTEROL SULFATE 3 MILLILITER(S): .5; 2.5 SOLUTION RESPIRATORY (INHALATION) at 08:13

## 2025-08-23 RX ADMIN — Medication 400 MILLIGRAM(S): at 11:48

## 2025-08-23 RX ADMIN — Medication 15 MILLILITER(S): at 18:33

## 2025-08-23 RX ADMIN — Medication 400 MILLIGRAM(S): at 18:34

## 2025-08-23 RX ADMIN — Medication 125 MILLIGRAM(S): at 18:33

## 2025-08-23 RX ADMIN — TOUCHLESS CARE ZINC OXIDE PROTECTANT 1 APPLICATION(S): 20; 25 SPRAY TOPICAL at 05:40

## 2025-08-23 RX ADMIN — Medication 1 APPLICATION(S): at 05:40

## 2025-08-23 RX ADMIN — Medication 125 MILLIGRAM(S): at 11:47

## 2025-08-23 RX ADMIN — Medication 15 MILLILITER(S): at 05:46

## 2025-08-23 RX ADMIN — IPRATROPIUM BROMIDE AND ALBUTEROL SULFATE 3 MILLILITER(S): .5; 2.5 SOLUTION RESPIRATORY (INHALATION) at 14:31

## 2025-08-23 RX ADMIN — ENOXAPARIN SODIUM 70 MILLIGRAM(S): 100 INJECTION SUBCUTANEOUS at 05:41

## 2025-08-23 RX ADMIN — Medication 1 PACKET(S): at 11:48

## 2025-08-23 RX ADMIN — Medication 1 PACKET(S): at 18:33

## 2025-08-23 RX ADMIN — TOUCHLESS CARE ZINC OXIDE PROTECTANT 1 APPLICATION(S): 20; 25 SPRAY TOPICAL at 18:33

## 2025-08-23 RX ADMIN — IPRATROPIUM BROMIDE AND ALBUTEROL SULFATE 3 MILLILITER(S): .5; 2.5 SOLUTION RESPIRATORY (INHALATION) at 20:46

## 2025-08-23 RX ADMIN — Medication 4 GRAM(S): at 09:44

## 2025-08-24 LAB
ALBUMIN SERPL ELPH-MCNC: 1.9 G/DL — LOW (ref 3.5–5.2)
ALP SERPL-CCNC: 90 U/L — SIGNIFICANT CHANGE UP (ref 30–115)
ALT FLD-CCNC: 10 U/L — SIGNIFICANT CHANGE UP (ref 0–41)
ANION GAP SERPL CALC-SCNC: 10 MMOL/L — SIGNIFICANT CHANGE UP (ref 7–14)
AST SERPL-CCNC: 9 U/L — SIGNIFICANT CHANGE UP (ref 0–41)
BASOPHILS # BLD AUTO: 0.01 K/UL — SIGNIFICANT CHANGE UP (ref 0–0.2)
BASOPHILS # BLD AUTO: 0.02 K/UL — SIGNIFICANT CHANGE UP (ref 0–0.2)
BASOPHILS NFR BLD AUTO: 0.2 % — SIGNIFICANT CHANGE UP (ref 0–1)
BASOPHILS NFR BLD AUTO: 0.3 % — SIGNIFICANT CHANGE UP (ref 0–1)
BILIRUB SERPL-MCNC: 0.4 MG/DL — SIGNIFICANT CHANGE UP (ref 0.2–1.2)
BUN SERPL-MCNC: 13 MG/DL — SIGNIFICANT CHANGE UP (ref 10–20)
CALCIUM SERPL-MCNC: 8.8 MG/DL — SIGNIFICANT CHANGE UP (ref 8.4–10.5)
CHLORIDE SERPL-SCNC: 104 MMOL/L — SIGNIFICANT CHANGE UP (ref 98–110)
CO2 SERPL-SCNC: 22 MMOL/L — SIGNIFICANT CHANGE UP (ref 17–32)
CREAT SERPL-MCNC: 0.6 MG/DL — LOW (ref 0.7–1.5)
EGFR: 95 ML/MIN/1.73M2 — SIGNIFICANT CHANGE UP
EGFR: 95 ML/MIN/1.73M2 — SIGNIFICANT CHANGE UP
EOSINOPHIL # BLD AUTO: 0.03 K/UL — SIGNIFICANT CHANGE UP (ref 0–0.7)
EOSINOPHIL # BLD AUTO: 0.03 K/UL — SIGNIFICANT CHANGE UP (ref 0–0.7)
EOSINOPHIL NFR BLD AUTO: 0.4 % — SIGNIFICANT CHANGE UP (ref 0–8)
EOSINOPHIL NFR BLD AUTO: 0.5 % — SIGNIFICANT CHANGE UP (ref 0–8)
GLUCOSE BLDC GLUCOMTR-MCNC: 119 MG/DL — HIGH (ref 70–99)
GLUCOSE BLDC GLUCOMTR-MCNC: 120 MG/DL — HIGH (ref 70–99)
GLUCOSE BLDC GLUCOMTR-MCNC: 123 MG/DL — HIGH (ref 70–99)
GLUCOSE BLDC GLUCOMTR-MCNC: 123 MG/DL — HIGH (ref 70–99)
GLUCOSE SERPL-MCNC: 95 MG/DL — SIGNIFICANT CHANGE UP (ref 70–99)
HCT VFR BLD CALC: 23.5 % — LOW (ref 37–47)
HCT VFR BLD CALC: 24.7 % — LOW (ref 37–47)
HGB BLD-MCNC: 7.7 G/DL — LOW (ref 12–16)
HGB BLD-MCNC: 8.2 G/DL — LOW (ref 12–16)
IMM GRANULOCYTES NFR BLD AUTO: 0.5 % — HIGH (ref 0.1–0.3)
IMM GRANULOCYTES NFR BLD AUTO: 0.6 % — HIGH (ref 0.1–0.3)
LYMPHOCYTES # BLD AUTO: 0.86 K/UL — LOW (ref 1.2–3.4)
LYMPHOCYTES # BLD AUTO: 0.9 K/UL — LOW (ref 1.2–3.4)
LYMPHOCYTES # BLD AUTO: 11.6 % — LOW (ref 20.5–51.1)
LYMPHOCYTES # BLD AUTO: 14 % — LOW (ref 20.5–51.1)
MAGNESIUM SERPL-MCNC: 1.3 MG/DL — LOW (ref 1.8–2.4)
MCHC RBC-ENTMCNC: 28.4 PG — SIGNIFICANT CHANGE UP (ref 27–31)
MCHC RBC-ENTMCNC: 28.9 PG — SIGNIFICANT CHANGE UP (ref 27–31)
MCHC RBC-ENTMCNC: 32.8 G/DL — SIGNIFICANT CHANGE UP (ref 32–37)
MCHC RBC-ENTMCNC: 33.2 G/DL — SIGNIFICANT CHANGE UP (ref 32–37)
MCV RBC AUTO: 86.7 FL — SIGNIFICANT CHANGE UP (ref 81–99)
MCV RBC AUTO: 87 FL — SIGNIFICANT CHANGE UP (ref 81–99)
MONOCYTES # BLD AUTO: 0.62 K/UL — HIGH (ref 0.1–0.6)
MONOCYTES # BLD AUTO: 0.75 K/UL — HIGH (ref 0.1–0.6)
MONOCYTES NFR BLD AUTO: 10.1 % — HIGH (ref 1.7–9.3)
MONOCYTES NFR BLD AUTO: 9.7 % — HIGH (ref 1.7–9.3)
NEUTROPHILS # BLD AUTO: 4.81 K/UL — SIGNIFICANT CHANGE UP (ref 1.4–6.5)
NEUTROPHILS # BLD AUTO: 5.74 K/UL — SIGNIFICANT CHANGE UP (ref 1.4–6.5)
NEUTROPHILS NFR BLD AUTO: 75 % — SIGNIFICANT CHANGE UP (ref 42.2–75.2)
NEUTROPHILS NFR BLD AUTO: 77.1 % — HIGH (ref 42.2–75.2)
NRBC BLD AUTO-RTO: 0 /100 WBCS — SIGNIFICANT CHANGE UP (ref 0–0)
NRBC BLD AUTO-RTO: 0 /100 WBCS — SIGNIFICANT CHANGE UP (ref 0–0)
PLATELET # BLD AUTO: 247 K/UL — SIGNIFICANT CHANGE UP (ref 130–400)
PLATELET # BLD AUTO: 261 K/UL — SIGNIFICANT CHANGE UP (ref 130–400)
PMV BLD: 10.7 FL — HIGH (ref 7.4–10.4)
PMV BLD: 10.8 FL — HIGH (ref 7.4–10.4)
POTASSIUM SERPL-MCNC: 3.8 MMOL/L — SIGNIFICANT CHANGE UP (ref 3.5–5)
POTASSIUM SERPL-SCNC: 3.8 MMOL/L — SIGNIFICANT CHANGE UP (ref 3.5–5)
PROT SERPL-MCNC: 4.2 G/DL — LOW (ref 6–8)
RBC # BLD: 2.71 M/UL — LOW (ref 4.2–5.4)
RBC # BLD: 2.84 M/UL — LOW (ref 4.2–5.4)
RBC # FLD: 13.8 % — SIGNIFICANT CHANGE UP (ref 11.5–14.5)
RBC # FLD: 13.9 % — SIGNIFICANT CHANGE UP (ref 11.5–14.5)
SODIUM SERPL-SCNC: 136 MMOL/L — SIGNIFICANT CHANGE UP (ref 135–146)
WBC # BLD: 6.41 K/UL — SIGNIFICANT CHANGE UP (ref 4.8–10.8)
WBC # BLD: 7.44 K/UL — SIGNIFICANT CHANGE UP (ref 4.8–10.8)
WBC # FLD AUTO: 6.41 K/UL — SIGNIFICANT CHANGE UP (ref 4.8–10.8)
WBC # FLD AUTO: 7.44 K/UL — SIGNIFICANT CHANGE UP (ref 4.8–10.8)

## 2025-08-24 PROCEDURE — 99232 SBSQ HOSP IP/OBS MODERATE 35: CPT

## 2025-08-24 PROCEDURE — 99233 SBSQ HOSP IP/OBS HIGH 50: CPT

## 2025-08-24 RX ORDER — ENOXAPARIN SODIUM 100 MG/ML
70 INJECTION SUBCUTANEOUS EVERY 12 HOURS
Refills: 0 | Status: DISCONTINUED | OUTPATIENT
Start: 2025-08-24 | End: 2025-08-29

## 2025-08-24 RX ORDER — MEROPENEM 1 G/30ML
INJECTION INTRAVENOUS
Refills: 0 | Status: DISCONTINUED | OUTPATIENT
Start: 2025-08-24 | End: 2025-08-26

## 2025-08-24 RX ORDER — MAGNESIUM SULFATE 500 MG/ML
2 SYRINGE (ML) INJECTION
Refills: 0 | Status: COMPLETED | OUTPATIENT
Start: 2025-08-24 | End: 2025-08-24

## 2025-08-24 RX ORDER — MEROPENEM 1 G/30ML
1000 INJECTION INTRAVENOUS EVERY 8 HOURS
Refills: 0 | Status: DISCONTINUED | OUTPATIENT
Start: 2025-08-24 | End: 2025-08-26

## 2025-08-24 RX ORDER — MEROPENEM 1 G/30ML
1000 INJECTION INTRAVENOUS ONCE
Refills: 0 | Status: COMPLETED | OUTPATIENT
Start: 2025-08-24 | End: 2025-08-24

## 2025-08-24 RX ADMIN — Medication 1 PACKET(S): at 18:53

## 2025-08-24 RX ADMIN — Medication 1 PACKET(S): at 06:02

## 2025-08-24 RX ADMIN — Medication 3 MILLIGRAM(S): at 21:23

## 2025-08-24 RX ADMIN — Medication 1 APPLICATION(S): at 06:03

## 2025-08-24 RX ADMIN — Medication 40 MILLIGRAM(S): at 06:02

## 2025-08-24 RX ADMIN — Medication 125 MILLIGRAM(S): at 18:53

## 2025-08-24 RX ADMIN — Medication 25 GRAM(S): at 11:03

## 2025-08-24 RX ADMIN — Medication 15 MILLILITER(S): at 06:02

## 2025-08-24 RX ADMIN — Medication 400 MILLIGRAM(S): at 12:40

## 2025-08-24 RX ADMIN — IPRATROPIUM BROMIDE AND ALBUTEROL SULFATE 3 MILLILITER(S): .5; 2.5 SOLUTION RESPIRATORY (INHALATION) at 08:06

## 2025-08-24 RX ADMIN — Medication 40 MILLIGRAM(S): at 18:52

## 2025-08-24 RX ADMIN — Medication 125 MILLIGRAM(S): at 12:40

## 2025-08-24 RX ADMIN — Medication 400 MILLIGRAM(S): at 08:38

## 2025-08-24 RX ADMIN — Medication 15 MILLILITER(S): at 18:53

## 2025-08-24 RX ADMIN — SODIUM CHLORIDE 75 MILLILITER(S): 9 INJECTION, SOLUTION INTRAVENOUS at 19:11

## 2025-08-24 RX ADMIN — Medication 125 MILLIGRAM(S): at 06:02

## 2025-08-24 RX ADMIN — SODIUM CHLORIDE 75 MILLILITER(S): 9 INJECTION, SOLUTION INTRAVENOUS at 08:39

## 2025-08-24 RX ADMIN — Medication 1 PACKET(S): at 00:15

## 2025-08-24 RX ADMIN — Medication 1 APPLICATION(S): at 18:53

## 2025-08-24 RX ADMIN — Medication 25 GRAM(S): at 08:38

## 2025-08-24 RX ADMIN — Medication 125 MILLIGRAM(S): at 23:12

## 2025-08-24 RX ADMIN — IPRATROPIUM BROMIDE AND ALBUTEROL SULFATE 3 MILLILITER(S): .5; 2.5 SOLUTION RESPIRATORY (INHALATION) at 01:24

## 2025-08-24 RX ADMIN — Medication 1 PACKET(S): at 23:12

## 2025-08-24 RX ADMIN — MEROPENEM 100 MILLIGRAM(S): 1 INJECTION INTRAVENOUS at 21:23

## 2025-08-24 RX ADMIN — Medication 4 GRAM(S): at 09:47

## 2025-08-24 RX ADMIN — MEROPENEM 100 MILLIGRAM(S): 1 INJECTION INTRAVENOUS at 12:39

## 2025-08-24 RX ADMIN — IPRATROPIUM BROMIDE AND ALBUTEROL SULFATE 3 MILLILITER(S): .5; 2.5 SOLUTION RESPIRATORY (INHALATION) at 13:45

## 2025-08-24 RX ADMIN — TOUCHLESS CARE ZINC OXIDE PROTECTANT 1 APPLICATION(S): 20; 25 SPRAY TOPICAL at 18:53

## 2025-08-24 RX ADMIN — Medication 400 MILLIGRAM(S): at 18:53

## 2025-08-24 RX ADMIN — Medication 125 MILLIGRAM(S): at 00:15

## 2025-08-24 RX ADMIN — ENOXAPARIN SODIUM 70 MILLIGRAM(S): 100 INJECTION SUBCUTANEOUS at 12:40

## 2025-08-24 RX ADMIN — Medication 1 PACKET(S): at 12:40

## 2025-08-24 RX ADMIN — IPRATROPIUM BROMIDE AND ALBUTEROL SULFATE 3 MILLILITER(S): .5; 2.5 SOLUTION RESPIRATORY (INHALATION) at 20:34

## 2025-08-24 RX ADMIN — TOUCHLESS CARE ZINC OXIDE PROTECTANT 1 APPLICATION(S): 20; 25 SPRAY TOPICAL at 06:03

## 2025-08-25 LAB
ANION GAP SERPL CALC-SCNC: 10 MMOL/L — SIGNIFICANT CHANGE UP (ref 7–14)
ANION GAP SERPL CALC-SCNC: 9 MMOL/L — SIGNIFICANT CHANGE UP (ref 7–14)
ANION GAP SERPL CALC-SCNC: 9 MMOL/L — SIGNIFICANT CHANGE UP (ref 7–14)
APTT BLD: 32.2 SEC — SIGNIFICANT CHANGE UP (ref 27–39.2)
BASOPHILS # BLD AUTO: 0.01 K/UL — SIGNIFICANT CHANGE UP (ref 0–0.2)
BASOPHILS NFR BLD AUTO: 0.2 % — SIGNIFICANT CHANGE UP (ref 0–1)
BUN SERPL-MCNC: 12 MG/DL — SIGNIFICANT CHANGE UP (ref 10–20)
CALCIUM SERPL-MCNC: 8.4 MG/DL — SIGNIFICANT CHANGE UP (ref 8.4–10.5)
CALCIUM SERPL-MCNC: 8.5 MG/DL — SIGNIFICANT CHANGE UP (ref 8.4–10.5)
CALCIUM SERPL-MCNC: 8.5 MG/DL — SIGNIFICANT CHANGE UP (ref 8.4–10.5)
CHLORIDE SERPL-SCNC: 104 MMOL/L — SIGNIFICANT CHANGE UP (ref 98–110)
CHLORIDE SERPL-SCNC: 105 MMOL/L — SIGNIFICANT CHANGE UP (ref 98–110)
CHLORIDE SERPL-SCNC: 105 MMOL/L — SIGNIFICANT CHANGE UP (ref 98–110)
CO2 SERPL-SCNC: 22 MMOL/L — SIGNIFICANT CHANGE UP (ref 17–32)
CO2 SERPL-SCNC: 22 MMOL/L — SIGNIFICANT CHANGE UP (ref 17–32)
CO2 SERPL-SCNC: 24 MMOL/L — SIGNIFICANT CHANGE UP (ref 17–32)
CREAT SERPL-MCNC: 0.5 MG/DL — LOW (ref 0.7–1.5)
EGFR: 100 ML/MIN/1.73M2 — SIGNIFICANT CHANGE UP
EOSINOPHIL # BLD AUTO: 0.05 K/UL — SIGNIFICANT CHANGE UP (ref 0–0.7)
EOSINOPHIL NFR BLD AUTO: 1.1 % — SIGNIFICANT CHANGE UP (ref 0–8)
GLUCOSE BLDC GLUCOMTR-MCNC: 100 MG/DL — HIGH (ref 70–99)
GLUCOSE BLDC GLUCOMTR-MCNC: 104 MG/DL — HIGH (ref 70–99)
GLUCOSE BLDC GLUCOMTR-MCNC: 117 MG/DL — HIGH (ref 70–99)
GLUCOSE BLDC GLUCOMTR-MCNC: 125 MG/DL — HIGH (ref 70–99)
GLUCOSE BLDC GLUCOMTR-MCNC: 99 MG/DL — SIGNIFICANT CHANGE UP (ref 70–99)
GLUCOSE SERPL-MCNC: 112 MG/DL — HIGH (ref 70–99)
GLUCOSE SERPL-MCNC: 83 MG/DL — SIGNIFICANT CHANGE UP (ref 70–99)
GLUCOSE SERPL-MCNC: 86 MG/DL — SIGNIFICANT CHANGE UP (ref 70–99)
HCT VFR BLD CALC: 23.5 % — LOW (ref 37–47)
HCT VFR BLD CALC: 23.8 % — LOW (ref 37–47)
HGB BLD-MCNC: 7.6 G/DL — LOW (ref 12–16)
HGB BLD-MCNC: 7.6 G/DL — LOW (ref 12–16)
IMM GRANULOCYTES NFR BLD AUTO: 0.9 % — HIGH (ref 0.1–0.3)
INR BLD: 1.09 RATIO — SIGNIFICANT CHANGE UP (ref 0.65–1.3)
LYMPHOCYTES # BLD AUTO: 1 K/UL — LOW (ref 1.2–3.4)
LYMPHOCYTES # BLD AUTO: 21.7 % — SIGNIFICANT CHANGE UP (ref 20.5–51.1)
MCHC RBC-ENTMCNC: 28.1 PG — SIGNIFICANT CHANGE UP (ref 27–31)
MCHC RBC-ENTMCNC: 28.6 PG — SIGNIFICANT CHANGE UP (ref 27–31)
MCHC RBC-ENTMCNC: 31.9 G/DL — LOW (ref 32–37)
MCHC RBC-ENTMCNC: 32.3 G/DL — SIGNIFICANT CHANGE UP (ref 32–37)
MCV RBC AUTO: 88.1 FL — SIGNIFICANT CHANGE UP (ref 81–99)
MCV RBC AUTO: 88.3 FL — SIGNIFICANT CHANGE UP (ref 81–99)
MONOCYTES # BLD AUTO: 0.74 K/UL — HIGH (ref 0.1–0.6)
MONOCYTES NFR BLD AUTO: 16.1 % — HIGH (ref 1.7–9.3)
NEUTROPHILS # BLD AUTO: 2.77 K/UL — SIGNIFICANT CHANGE UP (ref 1.4–6.5)
NEUTROPHILS NFR BLD AUTO: 60 % — SIGNIFICANT CHANGE UP (ref 42.2–75.2)
NRBC BLD AUTO-RTO: 0 /100 WBCS — SIGNIFICANT CHANGE UP (ref 0–0)
NRBC BLD AUTO-RTO: 0 /100 WBCS — SIGNIFICANT CHANGE UP (ref 0–0)
PLATELET # BLD AUTO: 277 K/UL — SIGNIFICANT CHANGE UP (ref 130–400)
PLATELET # BLD AUTO: 279 K/UL — SIGNIFICANT CHANGE UP (ref 130–400)
PMV BLD: 10.3 FL — SIGNIFICANT CHANGE UP (ref 7.4–10.4)
PMV BLD: 10.5 FL — HIGH (ref 7.4–10.4)
POTASSIUM SERPL-MCNC: 3.4 MMOL/L — LOW (ref 3.5–5)
POTASSIUM SERPL-MCNC: 3.4 MMOL/L — LOW (ref 3.5–5)
POTASSIUM SERPL-MCNC: 3.7 MMOL/L — SIGNIFICANT CHANGE UP (ref 3.5–5)
POTASSIUM SERPL-SCNC: 3.4 MMOL/L — LOW (ref 3.5–5)
POTASSIUM SERPL-SCNC: 3.4 MMOL/L — LOW (ref 3.5–5)
POTASSIUM SERPL-SCNC: 3.7 MMOL/L — SIGNIFICANT CHANGE UP (ref 3.5–5)
PROTHROM AB SERPL-ACNC: 12.9 SEC — HIGH (ref 9.95–12.87)
RBC # BLD: 2.66 M/UL — LOW (ref 4.2–5.4)
RBC # BLD: 2.7 M/UL — LOW (ref 4.2–5.4)
RBC # FLD: 13.7 % — SIGNIFICANT CHANGE UP (ref 11.5–14.5)
RBC # FLD: 14.1 % — SIGNIFICANT CHANGE UP (ref 11.5–14.5)
SODIUM SERPL-SCNC: 136 MMOL/L — SIGNIFICANT CHANGE UP (ref 135–146)
SODIUM SERPL-SCNC: 136 MMOL/L — SIGNIFICANT CHANGE UP (ref 135–146)
SODIUM SERPL-SCNC: 138 MMOL/L — SIGNIFICANT CHANGE UP (ref 135–146)
WBC # BLD: 4.61 K/UL — LOW (ref 4.8–10.8)
WBC # BLD: 6.21 K/UL — SIGNIFICANT CHANGE UP (ref 4.8–10.8)
WBC # FLD AUTO: 4.61 K/UL — LOW (ref 4.8–10.8)
WBC # FLD AUTO: 6.21 K/UL — SIGNIFICANT CHANGE UP (ref 4.8–10.8)

## 2025-08-25 PROCEDURE — 43246 EGD PLACE GASTROSTOMY TUBE: CPT

## 2025-08-25 PROCEDURE — 49446 CHANGE G-TUBE TO G-J PERC: CPT | Mod: XU

## 2025-08-25 PROCEDURE — 99233 SBSQ HOSP IP/OBS HIGH 50: CPT

## 2025-08-25 PROCEDURE — 43247 EGD REMOVE FOREIGN BODY: CPT | Mod: XU

## 2025-08-25 RX ADMIN — SODIUM CHLORIDE 75 MILLILITER(S): 9 INJECTION, SOLUTION INTRAVENOUS at 05:02

## 2025-08-25 RX ADMIN — ENOXAPARIN SODIUM 70 MILLIGRAM(S): 100 INJECTION SUBCUTANEOUS at 18:36

## 2025-08-25 RX ADMIN — IPRATROPIUM BROMIDE AND ALBUTEROL SULFATE 3 MILLILITER(S): .5; 2.5 SOLUTION RESPIRATORY (INHALATION) at 08:46

## 2025-08-25 RX ADMIN — Medication 125 MILLIGRAM(S): at 05:01

## 2025-08-25 RX ADMIN — Medication 1 PACKET(S): at 05:01

## 2025-08-25 RX ADMIN — Medication 1 PACKET(S): at 12:10

## 2025-08-25 RX ADMIN — Medication 40 MILLIEQUIVALENT(S): at 12:07

## 2025-08-25 RX ADMIN — Medication 15 MILLILITER(S): at 05:01

## 2025-08-25 RX ADMIN — Medication 1 APPLICATION(S): at 05:11

## 2025-08-25 RX ADMIN — MEROPENEM 100 MILLIGRAM(S): 1 INJECTION INTRAVENOUS at 14:41

## 2025-08-25 RX ADMIN — MEROPENEM 100 MILLIGRAM(S): 1 INJECTION INTRAVENOUS at 21:58

## 2025-08-25 RX ADMIN — Medication 400 MILLIGRAM(S): at 08:08

## 2025-08-25 RX ADMIN — IPRATROPIUM BROMIDE AND ALBUTEROL SULFATE 3 MILLILITER(S): .5; 2.5 SOLUTION RESPIRATORY (INHALATION) at 19:53

## 2025-08-25 RX ADMIN — Medication 40 MILLIGRAM(S): at 18:36

## 2025-08-25 RX ADMIN — IPRATROPIUM BROMIDE AND ALBUTEROL SULFATE 3 MILLILITER(S): .5; 2.5 SOLUTION RESPIRATORY (INHALATION) at 02:10

## 2025-08-25 RX ADMIN — TOUCHLESS CARE ZINC OXIDE PROTECTANT 1 APPLICATION(S): 20; 25 SPRAY TOPICAL at 05:18

## 2025-08-25 RX ADMIN — Medication 40 MILLIGRAM(S): at 05:01

## 2025-08-25 RX ADMIN — Medication 125 MILLIGRAM(S): at 12:09

## 2025-08-25 RX ADMIN — IPRATROPIUM BROMIDE AND ALBUTEROL SULFATE 3 MILLILITER(S): .5; 2.5 SOLUTION RESPIRATORY (INHALATION) at 14:49

## 2025-08-25 RX ADMIN — MEROPENEM 100 MILLIGRAM(S): 1 INJECTION INTRAVENOUS at 05:02

## 2025-08-25 RX ADMIN — Medication 15 MILLILITER(S): at 18:35

## 2025-08-25 RX ADMIN — Medication 1 APPLICATION(S): at 18:37

## 2025-08-25 RX ADMIN — Medication 400 MILLIGRAM(S): at 12:10

## 2025-08-25 RX ADMIN — Medication 4 GRAM(S): at 10:38

## 2025-08-25 RX ADMIN — Medication 1 APPLICATION(S): at 05:00

## 2025-08-25 RX ADMIN — TOUCHLESS CARE ZINC OXIDE PROTECTANT 1 APPLICATION(S): 20; 25 SPRAY TOPICAL at 18:36

## 2025-08-25 RX ADMIN — ENOXAPARIN SODIUM 70 MILLIGRAM(S): 100 INJECTION SUBCUTANEOUS at 05:01

## 2025-08-26 ENCOUNTER — TRANSCRIPTION ENCOUNTER (OUTPATIENT)
Age: 75
End: 2025-08-26

## 2025-08-26 PROCEDURE — 99233 SBSQ HOSP IP/OBS HIGH 50: CPT

## 2025-08-26 RX ADMIN — Medication 40 MILLIGRAM(S): at 06:40

## 2025-08-26 RX ADMIN — IPRATROPIUM BROMIDE AND ALBUTEROL SULFATE 3 MILLILITER(S): .5; 2.5 SOLUTION RESPIRATORY (INHALATION) at 01:44

## 2025-08-26 RX ADMIN — ENOXAPARIN SODIUM 70 MILLIGRAM(S): 100 INJECTION SUBCUTANEOUS at 06:28

## 2025-08-26 RX ADMIN — IPRATROPIUM BROMIDE AND ALBUTEROL SULFATE 3 MILLILITER(S): .5; 2.5 SOLUTION RESPIRATORY (INHALATION) at 20:29

## 2025-08-26 RX ADMIN — Medication 1 APPLICATION(S): at 06:42

## 2025-08-26 RX ADMIN — Medication 15 MILLILITER(S): at 06:29

## 2025-08-26 RX ADMIN — IPRATROPIUM BROMIDE AND ALBUTEROL SULFATE 3 MILLILITER(S): .5; 2.5 SOLUTION RESPIRATORY (INHALATION) at 08:29

## 2025-08-26 RX ADMIN — SODIUM CHLORIDE 75 MILLILITER(S): 9 INJECTION, SOLUTION INTRAVENOUS at 19:28

## 2025-08-26 RX ADMIN — IPRATROPIUM BROMIDE AND ALBUTEROL SULFATE 3 MILLILITER(S): .5; 2.5 SOLUTION RESPIRATORY (INHALATION) at 14:30

## 2025-08-26 RX ADMIN — Medication 1 APPLICATION(S): at 06:31

## 2025-08-26 RX ADMIN — TOUCHLESS CARE ZINC OXIDE PROTECTANT 1 APPLICATION(S): 20; 25 SPRAY TOPICAL at 06:30

## 2025-08-26 RX ADMIN — MEROPENEM 100 MILLIGRAM(S): 1 INJECTION INTRAVENOUS at 06:26

## 2025-08-27 LAB
ANION GAP SERPL CALC-SCNC: 8 MMOL/L — SIGNIFICANT CHANGE UP (ref 7–14)
BASOPHILS # BLD AUTO: 0.02 K/UL — SIGNIFICANT CHANGE UP (ref 0–0.2)
BASOPHILS NFR BLD AUTO: 0.3 % — SIGNIFICANT CHANGE UP (ref 0–1)
BUN SERPL-MCNC: 9 MG/DL — LOW (ref 10–20)
CALCIUM SERPL-MCNC: 8.6 MG/DL — SIGNIFICANT CHANGE UP (ref 8.4–10.5)
CHLORIDE SERPL-SCNC: 109 MMOL/L — SIGNIFICANT CHANGE UP (ref 98–110)
CO2 SERPL-SCNC: 24 MMOL/L — SIGNIFICANT CHANGE UP (ref 17–32)
CREAT SERPL-MCNC: 0.5 MG/DL — LOW (ref 0.7–1.5)
EGFR: 100 ML/MIN/1.73M2 — SIGNIFICANT CHANGE UP
EGFR: 100 ML/MIN/1.73M2 — SIGNIFICANT CHANGE UP
EOSINOPHIL # BLD AUTO: 0.05 K/UL — SIGNIFICANT CHANGE UP (ref 0–0.7)
EOSINOPHIL NFR BLD AUTO: 0.9 % — SIGNIFICANT CHANGE UP (ref 0–8)
GLUCOSE SERPL-MCNC: 85 MG/DL — SIGNIFICANT CHANGE UP (ref 70–99)
HCT VFR BLD CALC: 26.2 % — LOW (ref 37–47)
HGB BLD-MCNC: 8.5 G/DL — LOW (ref 12–16)
IMM GRANULOCYTES NFR BLD AUTO: 0.5 % — HIGH (ref 0.1–0.3)
LYMPHOCYTES # BLD AUTO: 1 K/UL — LOW (ref 1.2–3.4)
LYMPHOCYTES # BLD AUTO: 17.1 % — LOW (ref 20.5–51.1)
MCHC RBC-ENTMCNC: 28.1 PG — SIGNIFICANT CHANGE UP (ref 27–31)
MCHC RBC-ENTMCNC: 32.4 G/DL — SIGNIFICANT CHANGE UP (ref 32–37)
MCV RBC AUTO: 86.8 FL — SIGNIFICANT CHANGE UP (ref 81–99)
MONOCYTES # BLD AUTO: 0.73 K/UL — HIGH (ref 0.1–0.6)
MONOCYTES NFR BLD AUTO: 12.5 % — HIGH (ref 1.7–9.3)
NEUTROPHILS # BLD AUTO: 4.02 K/UL — SIGNIFICANT CHANGE UP (ref 1.4–6.5)
NEUTROPHILS NFR BLD AUTO: 68.7 % — SIGNIFICANT CHANGE UP (ref 42.2–75.2)
NRBC BLD AUTO-RTO: 0 /100 WBCS — SIGNIFICANT CHANGE UP (ref 0–0)
PLATELET # BLD AUTO: 306 K/UL — SIGNIFICANT CHANGE UP (ref 130–400)
PMV BLD: 10.9 FL — HIGH (ref 7.4–10.4)
POTASSIUM SERPL-MCNC: 3.2 MMOL/L — LOW (ref 3.5–5)
POTASSIUM SERPL-SCNC: 3.2 MMOL/L — LOW (ref 3.5–5)
RBC # BLD: 3.02 M/UL — LOW (ref 4.2–5.4)
RBC # FLD: 13.7 % — SIGNIFICANT CHANGE UP (ref 11.5–14.5)
SODIUM SERPL-SCNC: 141 MMOL/L — SIGNIFICANT CHANGE UP (ref 135–146)
WBC # BLD: 5.85 K/UL — SIGNIFICANT CHANGE UP (ref 4.8–10.8)
WBC # FLD AUTO: 5.85 K/UL — SIGNIFICANT CHANGE UP (ref 4.8–10.8)

## 2025-08-27 PROCEDURE — 99232 SBSQ HOSP IP/OBS MODERATE 35: CPT

## 2025-08-27 PROCEDURE — 99233 SBSQ HOSP IP/OBS HIGH 50: CPT

## 2025-08-27 RX ORDER — CINACALCET 30 MG/1
60 TABLET, FILM COATED ORAL
Refills: 0 | Status: DISCONTINUED | OUTPATIENT
Start: 2025-08-27 | End: 2025-08-28

## 2025-08-27 RX ADMIN — Medication 1 PACKET(S): at 23:49

## 2025-08-27 RX ADMIN — Medication 1 PACKET(S): at 12:45

## 2025-08-27 RX ADMIN — Medication 50 MILLIEQUIVALENT(S): at 14:42

## 2025-08-27 RX ADMIN — CINACALCET 60 MILLIGRAM(S): 30 TABLET, FILM COATED ORAL at 12:45

## 2025-08-27 RX ADMIN — Medication 50 MILLIEQUIVALENT(S): at 16:58

## 2025-08-27 RX ADMIN — Medication 400 MILLIGRAM(S): at 18:29

## 2025-08-27 RX ADMIN — IPRATROPIUM BROMIDE AND ALBUTEROL SULFATE 3 MILLILITER(S): .5; 2.5 SOLUTION RESPIRATORY (INHALATION) at 07:21

## 2025-08-27 RX ADMIN — ENOXAPARIN SODIUM 70 MILLIGRAM(S): 100 INJECTION SUBCUTANEOUS at 18:44

## 2025-08-27 RX ADMIN — TOUCHLESS CARE ZINC OXIDE PROTECTANT 1 APPLICATION(S): 20; 25 SPRAY TOPICAL at 05:50

## 2025-08-27 RX ADMIN — Medication 125 MILLIGRAM(S): at 23:49

## 2025-08-27 RX ADMIN — Medication 50 MILLIEQUIVALENT(S): at 12:44

## 2025-08-27 RX ADMIN — Medication 1 APPLICATION(S): at 05:47

## 2025-08-27 RX ADMIN — Medication 40 MILLIGRAM(S): at 18:28

## 2025-08-27 RX ADMIN — IPRATROPIUM BROMIDE AND ALBUTEROL SULFATE 3 MILLILITER(S): .5; 2.5 SOLUTION RESPIRATORY (INHALATION) at 01:51

## 2025-08-27 RX ADMIN — ENOXAPARIN SODIUM 70 MILLIGRAM(S): 100 INJECTION SUBCUTANEOUS at 10:40

## 2025-08-27 RX ADMIN — TOUCHLESS CARE ZINC OXIDE PROTECTANT 1 APPLICATION(S): 20; 25 SPRAY TOPICAL at 18:30

## 2025-08-27 RX ADMIN — Medication 125 MILLIGRAM(S): at 18:29

## 2025-08-27 RX ADMIN — Medication 1 APPLICATION(S): at 05:50

## 2025-08-27 RX ADMIN — Medication 400 MILLIGRAM(S): at 12:45

## 2025-08-27 RX ADMIN — IPRATROPIUM BROMIDE AND ALBUTEROL SULFATE 3 MILLILITER(S): .5; 2.5 SOLUTION RESPIRATORY (INHALATION) at 14:00

## 2025-08-27 RX ADMIN — Medication 1 APPLICATION(S): at 18:30

## 2025-08-27 RX ADMIN — Medication 125 MILLIGRAM(S): at 12:44

## 2025-08-27 RX ADMIN — Medication 1 PACKET(S): at 18:29

## 2025-08-27 RX ADMIN — IPRATROPIUM BROMIDE AND ALBUTEROL SULFATE 3 MILLILITER(S): .5; 2.5 SOLUTION RESPIRATORY (INHALATION) at 20:56

## 2025-08-27 RX ADMIN — Medication 40 MILLIGRAM(S): at 05:44

## 2025-08-27 RX ADMIN — Medication 15 MILLILITER(S): at 18:29

## 2025-08-28 LAB
ANION GAP SERPL CALC-SCNC: 10 MMOL/L — SIGNIFICANT CHANGE UP (ref 7–14)
BASOPHILS # BLD AUTO: 0.01 K/UL — SIGNIFICANT CHANGE UP (ref 0–0.2)
BASOPHILS NFR BLD AUTO: 0.1 % — SIGNIFICANT CHANGE UP (ref 0–1)
BUN SERPL-MCNC: 9 MG/DL — LOW (ref 10–20)
CALCIUM SERPL-MCNC: 8.3 MG/DL — LOW (ref 8.4–10.5)
CHLORIDE SERPL-SCNC: 111 MMOL/L — HIGH (ref 98–110)
CO2 SERPL-SCNC: 23 MMOL/L — SIGNIFICANT CHANGE UP (ref 17–32)
CREAT SERPL-MCNC: 0.5 MG/DL — LOW (ref 0.7–1.5)
EGFR: 100 ML/MIN/1.73M2 — SIGNIFICANT CHANGE UP
EGFR: 100 ML/MIN/1.73M2 — SIGNIFICANT CHANGE UP
EOSINOPHIL # BLD AUTO: 0.06 K/UL — SIGNIFICANT CHANGE UP (ref 0–0.7)
EOSINOPHIL NFR BLD AUTO: 0.9 % — SIGNIFICANT CHANGE UP (ref 0–8)
GLUCOSE SERPL-MCNC: 97 MG/DL — SIGNIFICANT CHANGE UP (ref 70–99)
HCT VFR BLD CALC: 25.1 % — LOW (ref 37–47)
HCT VFR BLD CALC: 25.2 % — LOW (ref 37–47)
HGB BLD-MCNC: 8 G/DL — LOW (ref 12–16)
HGB BLD-MCNC: 8.1 G/DL — LOW (ref 12–16)
IMM GRANULOCYTES NFR BLD AUTO: 0.7 % — HIGH (ref 0.1–0.3)
LYMPHOCYTES # BLD AUTO: 1.24 K/UL — SIGNIFICANT CHANGE UP (ref 1.2–3.4)
LYMPHOCYTES # BLD AUTO: 17.9 % — LOW (ref 20.5–51.1)
MCHC RBC-ENTMCNC: 28 PG — SIGNIFICANT CHANGE UP (ref 27–31)
MCHC RBC-ENTMCNC: 28.5 PG — SIGNIFICANT CHANGE UP (ref 27–31)
MCHC RBC-ENTMCNC: 31.7 G/DL — LOW (ref 32–37)
MCHC RBC-ENTMCNC: 32.3 G/DL — SIGNIFICANT CHANGE UP (ref 32–37)
MCV RBC AUTO: 88.1 FL — SIGNIFICANT CHANGE UP (ref 81–99)
MCV RBC AUTO: 88.4 FL — SIGNIFICANT CHANGE UP (ref 81–99)
MONOCYTES # BLD AUTO: 0.63 K/UL — HIGH (ref 0.1–0.6)
MONOCYTES NFR BLD AUTO: 9.1 % — SIGNIFICANT CHANGE UP (ref 1.7–9.3)
NEUTROPHILS # BLD AUTO: 4.92 K/UL — SIGNIFICANT CHANGE UP (ref 1.4–6.5)
NEUTROPHILS NFR BLD AUTO: 71.3 % — SIGNIFICANT CHANGE UP (ref 42.2–75.2)
NRBC BLD AUTO-RTO: 0 /100 WBCS — SIGNIFICANT CHANGE UP (ref 0–0)
NRBC BLD AUTO-RTO: 0 /100 WBCS — SIGNIFICANT CHANGE UP (ref 0–0)
PLATELET # BLD AUTO: 352 K/UL — SIGNIFICANT CHANGE UP (ref 130–400)
PLATELET # BLD AUTO: 372 K/UL — SIGNIFICANT CHANGE UP (ref 130–400)
PMV BLD: 10.9 FL — HIGH (ref 7.4–10.4)
PMV BLD: 9.7 FL — SIGNIFICANT CHANGE UP (ref 7.4–10.4)
POTASSIUM SERPL-MCNC: 4.2 MMOL/L — SIGNIFICANT CHANGE UP (ref 3.5–5)
POTASSIUM SERPL-SCNC: 4.2 MMOL/L — SIGNIFICANT CHANGE UP (ref 3.5–5)
RBC # BLD: 2.84 M/UL — LOW (ref 4.2–5.4)
RBC # BLD: 2.86 M/UL — LOW (ref 4.2–5.4)
RBC # FLD: 13.9 % — SIGNIFICANT CHANGE UP (ref 11.5–14.5)
RBC # FLD: 13.9 % — SIGNIFICANT CHANGE UP (ref 11.5–14.5)
SODIUM SERPL-SCNC: 144 MMOL/L — SIGNIFICANT CHANGE UP (ref 135–146)
WBC # BLD: 6.76 K/UL — SIGNIFICANT CHANGE UP (ref 4.8–10.8)
WBC # BLD: 6.91 K/UL — SIGNIFICANT CHANGE UP (ref 4.8–10.8)
WBC # FLD AUTO: 6.76 K/UL — SIGNIFICANT CHANGE UP (ref 4.8–10.8)
WBC # FLD AUTO: 6.91 K/UL — SIGNIFICANT CHANGE UP (ref 4.8–10.8)

## 2025-08-28 PROCEDURE — 99232 SBSQ HOSP IP/OBS MODERATE 35: CPT

## 2025-08-28 RX ORDER — NYSTATIN 100000 [USP'U]/G
1 CREAM TOPICAL
Refills: 0 | Status: COMPLETED | OUTPATIENT
Start: 2025-08-28 | End: 2025-08-31

## 2025-08-28 RX ORDER — TOUCHLESS CARE ZINC OXIDE PROTECTANT 20; 25 G/100G; G/100G
1 SPRAY TOPICAL
Qty: 0 | Refills: 0 | DISCHARGE
Start: 2025-08-28

## 2025-08-28 RX ORDER — SILVER SULFADIAZINE 1 %
1 CREAM (GRAM) TOPICAL
Refills: 0 | DISCHARGE

## 2025-08-28 RX ORDER — CINACALCET 30 MG/1
1 TABLET, FILM COATED ORAL
Refills: 0 | DISCHARGE

## 2025-08-28 RX ORDER — CINACALCET 30 MG/1
30 TABLET, FILM COATED ORAL EVERY 12 HOURS
Refills: 0 | Status: DISCONTINUED | OUTPATIENT
Start: 2025-08-28 | End: 2025-09-05

## 2025-08-28 RX ORDER — CINACALCET 30 MG/1
1 TABLET, FILM COATED ORAL
Qty: 0 | Refills: 0 | DISCHARGE
Start: 2025-08-28

## 2025-08-28 RX ORDER — TOUCHLESS CARE ZINC OXIDE PROTECTANT 20; 25 G/100G; G/100G
1 SPRAY TOPICAL
Refills: 0 | DISCHARGE

## 2025-08-28 RX ORDER — SILVER SULFADIAZINE 1 %
1 CREAM (GRAM) TOPICAL
Qty: 0 | Refills: 0 | DISCHARGE
Start: 2025-08-28

## 2025-08-28 RX ADMIN — CINACALCET 30 MILLIGRAM(S): 30 TABLET, FILM COATED ORAL at 23:37

## 2025-08-28 RX ADMIN — Medication 1 PACKET(S): at 05:11

## 2025-08-28 RX ADMIN — Medication 400 MILLIGRAM(S): at 12:25

## 2025-08-28 RX ADMIN — Medication 125 MILLIGRAM(S): at 23:38

## 2025-08-28 RX ADMIN — Medication 125 MILLIGRAM(S): at 12:25

## 2025-08-28 RX ADMIN — CINACALCET 60 MILLIGRAM(S): 30 TABLET, FILM COATED ORAL at 05:11

## 2025-08-28 RX ADMIN — CINACALCET 30 MILLIGRAM(S): 30 TABLET, FILM COATED ORAL at 16:20

## 2025-08-28 RX ADMIN — IPRATROPIUM BROMIDE AND ALBUTEROL SULFATE 3 MILLILITER(S): .5; 2.5 SOLUTION RESPIRATORY (INHALATION) at 19:54

## 2025-08-28 RX ADMIN — Medication 1 APPLICATION(S): at 05:18

## 2025-08-28 RX ADMIN — Medication 1 PACKET(S): at 12:24

## 2025-08-28 RX ADMIN — Medication 1 PACKET(S): at 23:38

## 2025-08-28 RX ADMIN — Medication 40 MILLIGRAM(S): at 18:13

## 2025-08-28 RX ADMIN — ENOXAPARIN SODIUM 70 MILLIGRAM(S): 100 INJECTION SUBCUTANEOUS at 05:11

## 2025-08-28 RX ADMIN — TOUCHLESS CARE ZINC OXIDE PROTECTANT 1 APPLICATION(S): 20; 25 SPRAY TOPICAL at 18:22

## 2025-08-28 RX ADMIN — Medication 125 MILLIGRAM(S): at 05:19

## 2025-08-28 RX ADMIN — IPRATROPIUM BROMIDE AND ALBUTEROL SULFATE 3 MILLILITER(S): .5; 2.5 SOLUTION RESPIRATORY (INHALATION) at 13:17

## 2025-08-28 RX ADMIN — Medication 1 APPLICATION(S): at 05:19

## 2025-08-28 RX ADMIN — Medication 400 MILLIGRAM(S): at 08:51

## 2025-08-28 RX ADMIN — ENOXAPARIN SODIUM 70 MILLIGRAM(S): 100 INJECTION SUBCUTANEOUS at 18:14

## 2025-08-28 RX ADMIN — Medication 1 APPLICATION(S): at 18:23

## 2025-08-28 RX ADMIN — Medication 125 MILLIGRAM(S): at 18:13

## 2025-08-28 RX ADMIN — Medication 4 GRAM(S): at 08:50

## 2025-08-28 RX ADMIN — Medication 400 MILLIGRAM(S): at 18:13

## 2025-08-28 RX ADMIN — Medication 1 PACKET(S): at 18:13

## 2025-08-28 RX ADMIN — IPRATROPIUM BROMIDE AND ALBUTEROL SULFATE 3 MILLILITER(S): .5; 2.5 SOLUTION RESPIRATORY (INHALATION) at 08:35

## 2025-08-28 RX ADMIN — TOUCHLESS CARE ZINC OXIDE PROTECTANT 1 APPLICATION(S): 20; 25 SPRAY TOPICAL at 05:18

## 2025-08-28 RX ADMIN — IPRATROPIUM BROMIDE AND ALBUTEROL SULFATE 3 MILLILITER(S): .5; 2.5 SOLUTION RESPIRATORY (INHALATION) at 02:50

## 2025-08-28 RX ADMIN — Medication 15 MILLILITER(S): at 18:13

## 2025-08-28 RX ADMIN — Medication 40 MILLIGRAM(S): at 05:11

## 2025-08-29 LAB
ANION GAP SERPL CALC-SCNC: 9 MMOL/L — SIGNIFICANT CHANGE UP (ref 7–14)
BASOPHILS # BLD AUTO: 0.02 K/UL — SIGNIFICANT CHANGE UP (ref 0–0.2)
BASOPHILS NFR BLD AUTO: 0.3 % — SIGNIFICANT CHANGE UP (ref 0–1)
BUN SERPL-MCNC: 13 MG/DL — SIGNIFICANT CHANGE UP (ref 10–20)
CALCIUM SERPL-MCNC: 8 MG/DL — LOW (ref 8.4–10.5)
CHLORIDE SERPL-SCNC: 112 MMOL/L — HIGH (ref 98–110)
CO2 SERPL-SCNC: 25 MMOL/L — SIGNIFICANT CHANGE UP (ref 17–32)
CREAT SERPL-MCNC: 0.5 MG/DL — LOW (ref 0.7–1.5)
EGFR: 100 ML/MIN/1.73M2 — SIGNIFICANT CHANGE UP
EGFR: 100 ML/MIN/1.73M2 — SIGNIFICANT CHANGE UP
EOSINOPHIL # BLD AUTO: 0.06 K/UL — SIGNIFICANT CHANGE UP (ref 0–0.7)
EOSINOPHIL NFR BLD AUTO: 1 % — SIGNIFICANT CHANGE UP (ref 0–8)
GLUCOSE SERPL-MCNC: 84 MG/DL — SIGNIFICANT CHANGE UP (ref 70–99)
HCT VFR BLD CALC: 25.9 % — LOW (ref 37–47)
HCT VFR BLD CALC: 26.1 % — LOW (ref 37–47)
HGB BLD-MCNC: 8.2 G/DL — LOW (ref 12–16)
HGB BLD-MCNC: 8.2 G/DL — LOW (ref 12–16)
IMM GRANULOCYTES NFR BLD AUTO: 0.8 % — HIGH (ref 0.1–0.3)
LYMPHOCYTES # BLD AUTO: 1.2 K/UL — SIGNIFICANT CHANGE UP (ref 1.2–3.4)
LYMPHOCYTES # BLD AUTO: 19.3 % — LOW (ref 20.5–51.1)
MCHC RBC-ENTMCNC: 27.9 PG — SIGNIFICANT CHANGE UP (ref 27–31)
MCHC RBC-ENTMCNC: 28.2 PG — SIGNIFICANT CHANGE UP (ref 27–31)
MCHC RBC-ENTMCNC: 31.4 G/DL — LOW (ref 32–37)
MCHC RBC-ENTMCNC: 31.7 G/DL — LOW (ref 32–37)
MCV RBC AUTO: 88.8 FL — SIGNIFICANT CHANGE UP (ref 81–99)
MCV RBC AUTO: 89 FL — SIGNIFICANT CHANGE UP (ref 81–99)
MONOCYTES # BLD AUTO: 0.52 K/UL — SIGNIFICANT CHANGE UP (ref 0.1–0.6)
MONOCYTES NFR BLD AUTO: 8.4 % — SIGNIFICANT CHANGE UP (ref 1.7–9.3)
NEUTROPHILS # BLD AUTO: 4.37 K/UL — SIGNIFICANT CHANGE UP (ref 1.4–6.5)
NEUTROPHILS NFR BLD AUTO: 70.2 % — SIGNIFICANT CHANGE UP (ref 42.2–75.2)
NRBC BLD AUTO-RTO: 0 /100 WBCS — SIGNIFICANT CHANGE UP (ref 0–0)
NRBC BLD AUTO-RTO: 0 /100 WBCS — SIGNIFICANT CHANGE UP (ref 0–0)
PLATELET # BLD AUTO: 370 K/UL — SIGNIFICANT CHANGE UP (ref 130–400)
PLATELET # BLD AUTO: 410 K/UL — HIGH (ref 130–400)
PMV BLD: 10 FL — SIGNIFICANT CHANGE UP (ref 7.4–10.4)
PMV BLD: 10 FL — SIGNIFICANT CHANGE UP (ref 7.4–10.4)
POTASSIUM SERPL-MCNC: 4 MMOL/L — SIGNIFICANT CHANGE UP (ref 3.5–5)
POTASSIUM SERPL-SCNC: 4 MMOL/L — SIGNIFICANT CHANGE UP (ref 3.5–5)
RBC # BLD: 2.91 M/UL — LOW (ref 4.2–5.4)
RBC # BLD: 2.94 M/UL — LOW (ref 4.2–5.4)
RBC # FLD: 14 % — SIGNIFICANT CHANGE UP (ref 11.5–14.5)
RBC # FLD: 14.1 % — SIGNIFICANT CHANGE UP (ref 11.5–14.5)
SODIUM SERPL-SCNC: 146 MMOL/L — SIGNIFICANT CHANGE UP (ref 135–146)
WBC # BLD: 6.22 K/UL — SIGNIFICANT CHANGE UP (ref 4.8–10.8)
WBC # BLD: 7.71 K/UL — SIGNIFICANT CHANGE UP (ref 4.8–10.8)
WBC # FLD AUTO: 6.22 K/UL — SIGNIFICANT CHANGE UP (ref 4.8–10.8)
WBC # FLD AUTO: 7.71 K/UL — SIGNIFICANT CHANGE UP (ref 4.8–10.8)

## 2025-08-29 PROCEDURE — 99233 SBSQ HOSP IP/OBS HIGH 50: CPT

## 2025-08-29 RX ORDER — MEROPENEM 1 G/30ML
1000 INJECTION INTRAVENOUS EVERY 12 HOURS
Refills: 0 | Status: COMPLETED | OUTPATIENT
Start: 2025-08-29 | End: 2025-09-05

## 2025-08-29 RX ADMIN — Medication 40 MILLIGRAM(S): at 19:00

## 2025-08-29 RX ADMIN — Medication 40 MILLIGRAM(S): at 06:03

## 2025-08-29 RX ADMIN — Medication 400 MILLIGRAM(S): at 15:39

## 2025-08-29 RX ADMIN — Medication 1 PACKET(S): at 23:13

## 2025-08-29 RX ADMIN — MEROPENEM 100 MILLIGRAM(S): 1 INJECTION INTRAVENOUS at 23:27

## 2025-08-29 RX ADMIN — NYSTATIN 1 APPLICATION(S): 100000 CREAM TOPICAL at 06:02

## 2025-08-29 RX ADMIN — Medication 15 MILLILITER(S): at 19:00

## 2025-08-29 RX ADMIN — IPRATROPIUM BROMIDE AND ALBUTEROL SULFATE 3 MILLILITER(S): .5; 2.5 SOLUTION RESPIRATORY (INHALATION) at 13:14

## 2025-08-29 RX ADMIN — CINACALCET 30 MILLIGRAM(S): 30 TABLET, FILM COATED ORAL at 15:38

## 2025-08-29 RX ADMIN — IPRATROPIUM BROMIDE AND ALBUTEROL SULFATE 3 MILLILITER(S): .5; 2.5 SOLUTION RESPIRATORY (INHALATION) at 19:42

## 2025-08-29 RX ADMIN — Medication 1 APPLICATION(S): at 19:01

## 2025-08-29 RX ADMIN — Medication 1 APPLICATION(S): at 06:02

## 2025-08-29 RX ADMIN — Medication 1 PACKET(S): at 19:01

## 2025-08-29 RX ADMIN — IPRATROPIUM BROMIDE AND ALBUTEROL SULFATE 3 MILLILITER(S): .5; 2.5 SOLUTION RESPIRATORY (INHALATION) at 07:15

## 2025-08-29 RX ADMIN — NYSTATIN 1 APPLICATION(S): 100000 CREAM TOPICAL at 19:02

## 2025-08-29 RX ADMIN — Medication 400 MILLIGRAM(S): at 19:01

## 2025-08-29 RX ADMIN — Medication 125 MILLIGRAM(S): at 06:02

## 2025-08-29 RX ADMIN — Medication 1 PACKET(S): at 06:03

## 2025-08-29 RX ADMIN — Medication 1 APPLICATION(S): at 06:04

## 2025-08-29 RX ADMIN — ENOXAPARIN SODIUM 70 MILLIGRAM(S): 100 INJECTION SUBCUTANEOUS at 06:03

## 2025-08-29 RX ADMIN — TOUCHLESS CARE ZINC OXIDE PROTECTANT 1 APPLICATION(S): 20; 25 SPRAY TOPICAL at 19:01

## 2025-08-29 RX ADMIN — Medication 650 MILLIGRAM(S): at 23:16

## 2025-08-29 RX ADMIN — TOUCHLESS CARE ZINC OXIDE PROTECTANT 1 APPLICATION(S): 20; 25 SPRAY TOPICAL at 06:02

## 2025-08-29 RX ADMIN — IPRATROPIUM BROMIDE AND ALBUTEROL SULFATE 3 MILLILITER(S): .5; 2.5 SOLUTION RESPIRATORY (INHALATION) at 01:17

## 2025-08-29 RX ADMIN — CINACALCET 30 MILLIGRAM(S): 30 TABLET, FILM COATED ORAL at 23:16

## 2025-08-30 LAB
ANION GAP SERPL CALC-SCNC: 11 MMOL/L — SIGNIFICANT CHANGE UP (ref 7–14)
BASOPHILS # BLD AUTO: 0.01 K/UL — SIGNIFICANT CHANGE UP (ref 0–0.2)
BASOPHILS NFR BLD AUTO: 0.1 % — SIGNIFICANT CHANGE UP (ref 0–1)
BUN SERPL-MCNC: 15 MG/DL — SIGNIFICANT CHANGE UP (ref 10–20)
CALCIUM SERPL-MCNC: 7.8 MG/DL — LOW (ref 8.4–10.5)
CHLORIDE SERPL-SCNC: 112 MMOL/L — HIGH (ref 98–110)
CO2 SERPL-SCNC: 23 MMOL/L — SIGNIFICANT CHANGE UP (ref 17–32)
CREAT SERPL-MCNC: 0.5 MG/DL — LOW (ref 0.7–1.5)
EGFR: 100 ML/MIN/1.73M2 — SIGNIFICANT CHANGE UP
EGFR: 100 ML/MIN/1.73M2 — SIGNIFICANT CHANGE UP
EOSINOPHIL # BLD AUTO: 0.1 K/UL — SIGNIFICANT CHANGE UP (ref 0–0.7)
EOSINOPHIL NFR BLD AUTO: 1.4 % — SIGNIFICANT CHANGE UP (ref 0–8)
GLUCOSE SERPL-MCNC: 86 MG/DL — SIGNIFICANT CHANGE UP (ref 70–99)
HCT VFR BLD CALC: 26 % — LOW (ref 37–47)
HGB BLD-MCNC: 8.1 G/DL — LOW (ref 12–16)
IMM GRANULOCYTES NFR BLD AUTO: 1.1 % — HIGH (ref 0.1–0.3)
LYMPHOCYTES # BLD AUTO: 1.17 K/UL — LOW (ref 1.2–3.4)
LYMPHOCYTES # BLD AUTO: 16.2 % — LOW (ref 20.5–51.1)
MCHC RBC-ENTMCNC: 28.2 PG — SIGNIFICANT CHANGE UP (ref 27–31)
MCHC RBC-ENTMCNC: 31.2 G/DL — LOW (ref 32–37)
MCV RBC AUTO: 90.6 FL — SIGNIFICANT CHANGE UP (ref 81–99)
MONOCYTES # BLD AUTO: 0.44 K/UL — SIGNIFICANT CHANGE UP (ref 0.1–0.6)
MONOCYTES NFR BLD AUTO: 6.1 % — SIGNIFICANT CHANGE UP (ref 1.7–9.3)
NEUTROPHILS # BLD AUTO: 5.41 K/UL — SIGNIFICANT CHANGE UP (ref 1.4–6.5)
NEUTROPHILS NFR BLD AUTO: 75.1 % — SIGNIFICANT CHANGE UP (ref 42.2–75.2)
NRBC BLD AUTO-RTO: 0 /100 WBCS — SIGNIFICANT CHANGE UP (ref 0–0)
PLATELET # BLD AUTO: 213 K/UL — SIGNIFICANT CHANGE UP (ref 130–400)
PMV BLD: 10.9 FL — HIGH (ref 7.4–10.4)
POTASSIUM SERPL-MCNC: 4.5 MMOL/L — SIGNIFICANT CHANGE UP (ref 3.5–5)
POTASSIUM SERPL-SCNC: 4.5 MMOL/L — SIGNIFICANT CHANGE UP (ref 3.5–5)
RBC # BLD: 2.87 M/UL — LOW (ref 4.2–5.4)
RBC # FLD: 14.3 % — SIGNIFICANT CHANGE UP (ref 11.5–14.5)
SODIUM SERPL-SCNC: 146 MMOL/L — SIGNIFICANT CHANGE UP (ref 135–146)
WBC # BLD: 7.21 K/UL — SIGNIFICANT CHANGE UP (ref 4.8–10.8)
WBC # FLD AUTO: 7.21 K/UL — SIGNIFICANT CHANGE UP (ref 4.8–10.8)

## 2025-08-30 PROCEDURE — 99233 SBSQ HOSP IP/OBS HIGH 50: CPT

## 2025-08-30 RX ORDER — ENOXAPARIN SODIUM 100 MG/ML
70 INJECTION SUBCUTANEOUS EVERY 12 HOURS
Refills: 0 | Status: DISCONTINUED | OUTPATIENT
Start: 2025-08-30 | End: 2025-09-05

## 2025-08-30 RX ADMIN — TOUCHLESS CARE ZINC OXIDE PROTECTANT 1 APPLICATION(S): 20; 25 SPRAY TOPICAL at 06:15

## 2025-08-30 RX ADMIN — Medication 1 PACKET(S): at 12:15

## 2025-08-30 RX ADMIN — IPRATROPIUM BROMIDE AND ALBUTEROL SULFATE 3 MILLILITER(S): .5; 2.5 SOLUTION RESPIRATORY (INHALATION) at 01:51

## 2025-08-30 RX ADMIN — ENOXAPARIN SODIUM 70 MILLIGRAM(S): 100 INJECTION SUBCUTANEOUS at 22:04

## 2025-08-30 RX ADMIN — CINACALCET 30 MILLIGRAM(S): 30 TABLET, FILM COATED ORAL at 23:22

## 2025-08-30 RX ADMIN — IPRATROPIUM BROMIDE AND ALBUTEROL SULFATE 3 MILLILITER(S): .5; 2.5 SOLUTION RESPIRATORY (INHALATION) at 16:41

## 2025-08-30 RX ADMIN — Medication 1 APPLICATION(S): at 06:16

## 2025-08-30 RX ADMIN — Medication 400 MILLIGRAM(S): at 17:59

## 2025-08-30 RX ADMIN — ENOXAPARIN SODIUM 70 MILLIGRAM(S): 100 INJECTION SUBCUTANEOUS at 12:11

## 2025-08-30 RX ADMIN — Medication 1 PACKET(S): at 06:15

## 2025-08-30 RX ADMIN — Medication 1 PACKET(S): at 23:22

## 2025-08-30 RX ADMIN — MEROPENEM 100 MILLIGRAM(S): 1 INJECTION INTRAVENOUS at 06:13

## 2025-08-30 RX ADMIN — Medication 4 GRAM(S): at 09:16

## 2025-08-30 RX ADMIN — NYSTATIN 1 APPLICATION(S): 100000 CREAM TOPICAL at 06:15

## 2025-08-30 RX ADMIN — Medication 15 MILLILITER(S): at 17:58

## 2025-08-30 RX ADMIN — CINACALCET 30 MILLIGRAM(S): 30 TABLET, FILM COATED ORAL at 12:12

## 2025-08-30 RX ADMIN — Medication 650 MILLIGRAM(S): at 02:17

## 2025-08-30 RX ADMIN — Medication 40 MILLIGRAM(S): at 17:58

## 2025-08-30 RX ADMIN — IPRATROPIUM BROMIDE AND ALBUTEROL SULFATE 3 MILLILITER(S): .5; 2.5 SOLUTION RESPIRATORY (INHALATION) at 19:50

## 2025-08-30 RX ADMIN — NYSTATIN 1 APPLICATION(S): 100000 CREAM TOPICAL at 17:59

## 2025-08-30 RX ADMIN — MEROPENEM 100 MILLIGRAM(S): 1 INJECTION INTRAVENOUS at 17:58

## 2025-08-30 RX ADMIN — SODIUM CHLORIDE 75 MILLILITER(S): 9 INJECTION, SOLUTION INTRAVENOUS at 10:09

## 2025-08-30 RX ADMIN — IPRATROPIUM BROMIDE AND ALBUTEROL SULFATE 3 MILLILITER(S): .5; 2.5 SOLUTION RESPIRATORY (INHALATION) at 12:31

## 2025-08-30 RX ADMIN — Medication 1 APPLICATION(S): at 06:15

## 2025-08-30 RX ADMIN — Medication 1 PACKET(S): at 17:58

## 2025-08-30 RX ADMIN — Medication 400 MILLIGRAM(S): at 12:12

## 2025-08-30 RX ADMIN — Medication 400 MILLIGRAM(S): at 09:16

## 2025-08-30 RX ADMIN — TOUCHLESS CARE ZINC OXIDE PROTECTANT 1 APPLICATION(S): 20; 25 SPRAY TOPICAL at 18:01

## 2025-08-30 RX ADMIN — Medication 40 MILLIGRAM(S): at 06:15

## 2025-08-30 RX ADMIN — Medication 1 APPLICATION(S): at 18:01

## 2025-08-31 LAB
ANION GAP SERPL CALC-SCNC: 9 MMOL/L — SIGNIFICANT CHANGE UP (ref 7–14)
BASOPHILS # BLD AUTO: 0.02 K/UL — SIGNIFICANT CHANGE UP (ref 0–0.2)
BASOPHILS NFR BLD AUTO: 0.2 % — SIGNIFICANT CHANGE UP (ref 0–1)
BUN SERPL-MCNC: 17 MG/DL — SIGNIFICANT CHANGE UP (ref 10–20)
CALCIUM SERPL-MCNC: 7.7 MG/DL — LOW (ref 8.4–10.5)
CHLORIDE SERPL-SCNC: 109 MMOL/L — SIGNIFICANT CHANGE UP (ref 98–110)
CO2 SERPL-SCNC: 22 MMOL/L — SIGNIFICANT CHANGE UP (ref 17–32)
CREAT SERPL-MCNC: 0.5 MG/DL — LOW (ref 0.7–1.5)
EGFR: 100 ML/MIN/1.73M2 — SIGNIFICANT CHANGE UP
EGFR: 100 ML/MIN/1.73M2 — SIGNIFICANT CHANGE UP
EOSINOPHIL # BLD AUTO: 0.12 K/UL — SIGNIFICANT CHANGE UP (ref 0–0.7)
EOSINOPHIL NFR BLD AUTO: 1.3 % — SIGNIFICANT CHANGE UP (ref 0–8)
GLUCOSE SERPL-MCNC: 99 MG/DL — SIGNIFICANT CHANGE UP (ref 70–99)
HCT VFR BLD CALC: 26.8 % — LOW (ref 37–47)
HGB BLD-MCNC: 8.3 G/DL — LOW (ref 12–16)
IMM GRANULOCYTES NFR BLD AUTO: 1.1 % — HIGH (ref 0.1–0.3)
LYMPHOCYTES # BLD AUTO: 1.52 K/UL — SIGNIFICANT CHANGE UP (ref 1.2–3.4)
LYMPHOCYTES # BLD AUTO: 16.2 % — LOW (ref 20.5–51.1)
MCHC RBC-ENTMCNC: 27.9 PG — SIGNIFICANT CHANGE UP (ref 27–31)
MCHC RBC-ENTMCNC: 31 G/DL — LOW (ref 32–37)
MCV RBC AUTO: 89.9 FL — SIGNIFICANT CHANGE UP (ref 81–99)
MONOCYTES # BLD AUTO: 0.67 K/UL — HIGH (ref 0.1–0.6)
MONOCYTES NFR BLD AUTO: 7.2 % — SIGNIFICANT CHANGE UP (ref 1.7–9.3)
NEUTROPHILS # BLD AUTO: 6.93 K/UL — HIGH (ref 1.4–6.5)
NEUTROPHILS NFR BLD AUTO: 74 % — SIGNIFICANT CHANGE UP (ref 42.2–75.2)
NRBC BLD AUTO-RTO: 0 /100 WBCS — SIGNIFICANT CHANGE UP (ref 0–0)
PLATELET # BLD AUTO: 230 K/UL — SIGNIFICANT CHANGE UP (ref 130–400)
PMV BLD: 10.8 FL — HIGH (ref 7.4–10.4)
POTASSIUM SERPL-MCNC: 4.5 MMOL/L — SIGNIFICANT CHANGE UP (ref 3.5–5)
POTASSIUM SERPL-SCNC: 4.5 MMOL/L — SIGNIFICANT CHANGE UP (ref 3.5–5)
RBC # BLD: 2.98 M/UL — LOW (ref 4.2–5.4)
RBC # FLD: 14.2 % — SIGNIFICANT CHANGE UP (ref 11.5–14.5)
SODIUM SERPL-SCNC: 140 MMOL/L — SIGNIFICANT CHANGE UP (ref 135–146)
WBC # BLD: 9.36 K/UL — SIGNIFICANT CHANGE UP (ref 4.8–10.8)
WBC # FLD AUTO: 9.36 K/UL — SIGNIFICANT CHANGE UP (ref 4.8–10.8)

## 2025-08-31 PROCEDURE — 99233 SBSQ HOSP IP/OBS HIGH 50: CPT

## 2025-08-31 RX ADMIN — Medication 1 PACKET(S): at 05:13

## 2025-08-31 RX ADMIN — Medication 15 MILLILITER(S): at 18:06

## 2025-08-31 RX ADMIN — ENOXAPARIN SODIUM 70 MILLIGRAM(S): 100 INJECTION SUBCUTANEOUS at 12:13

## 2025-08-31 RX ADMIN — MEROPENEM 100 MILLIGRAM(S): 1 INJECTION INTRAVENOUS at 05:15

## 2025-08-31 RX ADMIN — IPRATROPIUM BROMIDE AND ALBUTEROL SULFATE 3 MILLILITER(S): .5; 2.5 SOLUTION RESPIRATORY (INHALATION) at 19:34

## 2025-08-31 RX ADMIN — CINACALCET 30 MILLIGRAM(S): 30 TABLET, FILM COATED ORAL at 12:13

## 2025-08-31 RX ADMIN — IPRATROPIUM BROMIDE AND ALBUTEROL SULFATE 3 MILLILITER(S): .5; 2.5 SOLUTION RESPIRATORY (INHALATION) at 02:54

## 2025-08-31 RX ADMIN — Medication 400 MILLIGRAM(S): at 12:13

## 2025-08-31 RX ADMIN — ENOXAPARIN SODIUM 70 MILLIGRAM(S): 100 INJECTION SUBCUTANEOUS at 23:15

## 2025-08-31 RX ADMIN — Medication 1 PACKET(S): at 18:06

## 2025-08-31 RX ADMIN — Medication 40 MILLIGRAM(S): at 18:07

## 2025-08-31 RX ADMIN — Medication 1 PACKET(S): at 23:15

## 2025-08-31 RX ADMIN — IPRATROPIUM BROMIDE AND ALBUTEROL SULFATE 3 MILLILITER(S): .5; 2.5 SOLUTION RESPIRATORY (INHALATION) at 08:57

## 2025-08-31 RX ADMIN — Medication 40 MILLIGRAM(S): at 05:14

## 2025-08-31 RX ADMIN — Medication 400 MILLIGRAM(S): at 08:44

## 2025-08-31 RX ADMIN — Medication 1 APPLICATION(S): at 05:14

## 2025-08-31 RX ADMIN — NYSTATIN 1 APPLICATION(S): 100000 CREAM TOPICAL at 18:07

## 2025-08-31 RX ADMIN — NYSTATIN 1 APPLICATION(S): 100000 CREAM TOPICAL at 05:15

## 2025-08-31 RX ADMIN — Medication 15 MILLILITER(S): at 05:14

## 2025-08-31 RX ADMIN — Medication 400 MILLIGRAM(S): at 18:07

## 2025-08-31 RX ADMIN — MEROPENEM 100 MILLIGRAM(S): 1 INJECTION INTRAVENOUS at 18:06

## 2025-08-31 RX ADMIN — TOUCHLESS CARE ZINC OXIDE PROTECTANT 1 APPLICATION(S): 20; 25 SPRAY TOPICAL at 05:15

## 2025-08-31 RX ADMIN — SODIUM CHLORIDE 75 MILLILITER(S): 9 INJECTION, SOLUTION INTRAVENOUS at 08:43

## 2025-08-31 RX ADMIN — Medication 1 APPLICATION(S): at 05:15

## 2025-08-31 RX ADMIN — CINACALCET 30 MILLIGRAM(S): 30 TABLET, FILM COATED ORAL at 23:15

## 2025-08-31 RX ADMIN — Medication 4 GRAM(S): at 09:42

## 2025-08-31 RX ADMIN — Medication 1 APPLICATION(S): at 18:07

## 2025-08-31 RX ADMIN — Medication 1 PACKET(S): at 12:13

## 2025-08-31 RX ADMIN — IPRATROPIUM BROMIDE AND ALBUTEROL SULFATE 3 MILLILITER(S): .5; 2.5 SOLUTION RESPIRATORY (INHALATION) at 13:52

## 2025-08-31 RX ADMIN — TOUCHLESS CARE ZINC OXIDE PROTECTANT 1 APPLICATION(S): 20; 25 SPRAY TOPICAL at 18:07

## 2025-09-01 LAB
ANION GAP SERPL CALC-SCNC: 7 MMOL/L — SIGNIFICANT CHANGE UP (ref 7–14)
BASOPHILS # BLD AUTO: 0.02 K/UL — SIGNIFICANT CHANGE UP (ref 0–0.2)
BASOPHILS NFR BLD AUTO: 0.2 % — SIGNIFICANT CHANGE UP (ref 0–1)
BUN SERPL-MCNC: 15 MG/DL — SIGNIFICANT CHANGE UP (ref 10–20)
CALCIUM SERPL-MCNC: 7.4 MG/DL — LOW (ref 8.4–10.5)
CHLORIDE SERPL-SCNC: 106 MMOL/L — SIGNIFICANT CHANGE UP (ref 98–110)
CO2 SERPL-SCNC: 24 MMOL/L — SIGNIFICANT CHANGE UP (ref 17–32)
CREAT SERPL-MCNC: <0.5 MG/DL — LOW (ref 0.7–1.5)
EGFR: 105 ML/MIN/1.73M2 — SIGNIFICANT CHANGE UP
EGFR: 105 ML/MIN/1.73M2 — SIGNIFICANT CHANGE UP
EOSINOPHIL # BLD AUTO: 0.14 K/UL — SIGNIFICANT CHANGE UP (ref 0–0.7)
EOSINOPHIL NFR BLD AUTO: 1.4 % — SIGNIFICANT CHANGE UP (ref 0–8)
GLUCOSE SERPL-MCNC: 105 MG/DL — HIGH (ref 70–99)
HCT VFR BLD CALC: 23.9 % — LOW (ref 37–47)
HGB BLD-MCNC: 7.7 G/DL — LOW (ref 12–16)
IMM GRANULOCYTES NFR BLD AUTO: 0.9 % — HIGH (ref 0.1–0.3)
LYMPHOCYTES # BLD AUTO: 1.44 K/UL — SIGNIFICANT CHANGE UP (ref 1.2–3.4)
LYMPHOCYTES # BLD AUTO: 14 % — LOW (ref 20.5–51.1)
MCHC RBC-ENTMCNC: 28.5 PG — SIGNIFICANT CHANGE UP (ref 27–31)
MCHC RBC-ENTMCNC: 32.2 G/DL — SIGNIFICANT CHANGE UP (ref 32–37)
MCV RBC AUTO: 88.5 FL — SIGNIFICANT CHANGE UP (ref 81–99)
MONOCYTES # BLD AUTO: 0.63 K/UL — HIGH (ref 0.1–0.6)
MONOCYTES NFR BLD AUTO: 6.1 % — SIGNIFICANT CHANGE UP (ref 1.7–9.3)
NEUTROPHILS # BLD AUTO: 7.98 K/UL — HIGH (ref 1.4–6.5)
NEUTROPHILS NFR BLD AUTO: 77.4 % — HIGH (ref 42.2–75.2)
NRBC BLD AUTO-RTO: 0 /100 WBCS — SIGNIFICANT CHANGE UP (ref 0–0)
PLATELET # BLD AUTO: 330 K/UL — SIGNIFICANT CHANGE UP (ref 130–400)
PMV BLD: 10.4 FL — SIGNIFICANT CHANGE UP (ref 7.4–10.4)
POTASSIUM SERPL-MCNC: 4.2 MMOL/L — SIGNIFICANT CHANGE UP (ref 3.5–5)
POTASSIUM SERPL-SCNC: 4.2 MMOL/L — SIGNIFICANT CHANGE UP (ref 3.5–5)
RBC # BLD: 2.7 M/UL — LOW (ref 4.2–5.4)
RBC # FLD: 14.6 % — HIGH (ref 11.5–14.5)
SODIUM SERPL-SCNC: 137 MMOL/L — SIGNIFICANT CHANGE UP (ref 135–146)
WBC # BLD: 10.3 K/UL — SIGNIFICANT CHANGE UP (ref 4.8–10.8)
WBC # FLD AUTO: 10.3 K/UL — SIGNIFICANT CHANGE UP (ref 4.8–10.8)

## 2025-09-01 PROCEDURE — 99232 SBSQ HOSP IP/OBS MODERATE 35: CPT

## 2025-09-01 RX ADMIN — Medication 15 MILLILITER(S): at 17:10

## 2025-09-01 RX ADMIN — Medication 400 MILLIGRAM(S): at 10:16

## 2025-09-01 RX ADMIN — IPRATROPIUM BROMIDE AND ALBUTEROL SULFATE 3 MILLILITER(S): .5; 2.5 SOLUTION RESPIRATORY (INHALATION) at 08:04

## 2025-09-01 RX ADMIN — Medication 40 MILLIGRAM(S): at 05:25

## 2025-09-01 RX ADMIN — Medication 40 MILLIGRAM(S): at 17:10

## 2025-09-01 RX ADMIN — Medication 400 MILLIGRAM(S): at 17:11

## 2025-09-01 RX ADMIN — ENOXAPARIN SODIUM 70 MILLIGRAM(S): 100 INJECTION SUBCUTANEOUS at 23:07

## 2025-09-01 RX ADMIN — CINACALCET 30 MILLIGRAM(S): 30 TABLET, FILM COATED ORAL at 12:51

## 2025-09-01 RX ADMIN — Medication 4 GRAM(S): at 10:16

## 2025-09-01 RX ADMIN — Medication 1 APPLICATION(S): at 17:08

## 2025-09-01 RX ADMIN — Medication 400 MILLIGRAM(S): at 12:51

## 2025-09-01 RX ADMIN — TOUCHLESS CARE ZINC OXIDE PROTECTANT 1 APPLICATION(S): 20; 25 SPRAY TOPICAL at 17:08

## 2025-09-01 RX ADMIN — MEROPENEM 100 MILLIGRAM(S): 1 INJECTION INTRAVENOUS at 05:25

## 2025-09-01 RX ADMIN — Medication 1 PACKET(S): at 05:25

## 2025-09-01 RX ADMIN — IPRATROPIUM BROMIDE AND ALBUTEROL SULFATE 3 MILLILITER(S): .5; 2.5 SOLUTION RESPIRATORY (INHALATION) at 19:29

## 2025-09-01 RX ADMIN — Medication 1 APPLICATION(S): at 05:27

## 2025-09-01 RX ADMIN — Medication 1 PACKET(S): at 17:10

## 2025-09-01 RX ADMIN — Medication 1 APPLICATION(S): at 05:28

## 2025-09-01 RX ADMIN — TOUCHLESS CARE ZINC OXIDE PROTECTANT 1 APPLICATION(S): 20; 25 SPRAY TOPICAL at 05:27

## 2025-09-01 RX ADMIN — SODIUM CHLORIDE 75 MILLILITER(S): 9 INJECTION, SOLUTION INTRAVENOUS at 23:01

## 2025-09-01 RX ADMIN — Medication 1 PACKET(S): at 12:52

## 2025-09-01 RX ADMIN — Medication 15 MILLILITER(S): at 05:26

## 2025-09-01 RX ADMIN — Medication 1 PACKET(S): at 23:07

## 2025-09-01 RX ADMIN — IPRATROPIUM BROMIDE AND ALBUTEROL SULFATE 3 MILLILITER(S): .5; 2.5 SOLUTION RESPIRATORY (INHALATION) at 03:09

## 2025-09-01 RX ADMIN — CINACALCET 30 MILLIGRAM(S): 30 TABLET, FILM COATED ORAL at 23:07

## 2025-09-01 RX ADMIN — ENOXAPARIN SODIUM 70 MILLIGRAM(S): 100 INJECTION SUBCUTANEOUS at 10:16

## 2025-09-01 RX ADMIN — MEROPENEM 100 MILLIGRAM(S): 1 INJECTION INTRAVENOUS at 17:10

## 2025-09-01 RX ADMIN — IPRATROPIUM BROMIDE AND ALBUTEROL SULFATE 3 MILLILITER(S): .5; 2.5 SOLUTION RESPIRATORY (INHALATION) at 14:23

## 2025-09-02 LAB
ALBUMIN SERPL ELPH-MCNC: 1.9 G/DL — LOW (ref 3.5–5.2)
ALP SERPL-CCNC: 103 U/L — SIGNIFICANT CHANGE UP (ref 30–115)
ALT FLD-CCNC: 7 U/L — SIGNIFICANT CHANGE UP (ref 0–41)
ANION GAP SERPL CALC-SCNC: 8 MMOL/L — SIGNIFICANT CHANGE UP (ref 7–14)
AST SERPL-CCNC: 10 U/L — SIGNIFICANT CHANGE UP (ref 0–41)
BASOPHILS # BLD AUTO: 0.03 K/UL — SIGNIFICANT CHANGE UP (ref 0–0.2)
BASOPHILS NFR BLD AUTO: 0.3 % — SIGNIFICANT CHANGE UP (ref 0–1)
BILIRUB SERPL-MCNC: 0.4 MG/DL — SIGNIFICANT CHANGE UP (ref 0.2–1.2)
BUN SERPL-MCNC: 15 MG/DL — SIGNIFICANT CHANGE UP (ref 10–20)
CALCIUM SERPL-MCNC: 7.9 MG/DL — LOW (ref 8.4–10.5)
CHLORIDE SERPL-SCNC: 106 MMOL/L — SIGNIFICANT CHANGE UP (ref 98–110)
CO2 SERPL-SCNC: 25 MMOL/L — SIGNIFICANT CHANGE UP (ref 17–32)
CREAT SERPL-MCNC: <0.5 MG/DL — LOW (ref 0.7–1.5)
EGFR: 105 ML/MIN/1.73M2 — SIGNIFICANT CHANGE UP
EGFR: 105 ML/MIN/1.73M2 — SIGNIFICANT CHANGE UP
EOSINOPHIL # BLD AUTO: 0.14 K/UL — SIGNIFICANT CHANGE UP (ref 0–0.7)
EOSINOPHIL NFR BLD AUTO: 1.6 % — SIGNIFICANT CHANGE UP (ref 0–8)
GLUCOSE SERPL-MCNC: 95 MG/DL — SIGNIFICANT CHANGE UP (ref 70–99)
HCT VFR BLD CALC: 26.7 % — LOW (ref 37–47)
HGB BLD-MCNC: 8.7 G/DL — LOW (ref 12–16)
IMM GRANULOCYTES NFR BLD AUTO: 1.3 % — HIGH (ref 0.1–0.3)
LYMPHOCYTES # BLD AUTO: 1.45 K/UL — SIGNIFICANT CHANGE UP (ref 1.2–3.4)
LYMPHOCYTES # BLD AUTO: 16.2 % — LOW (ref 20.5–51.1)
MAGNESIUM SERPL-MCNC: 1.3 MG/DL — LOW (ref 1.8–2.4)
MCHC RBC-ENTMCNC: 28.6 PG — SIGNIFICANT CHANGE UP (ref 27–31)
MCHC RBC-ENTMCNC: 32.6 G/DL — SIGNIFICANT CHANGE UP (ref 32–37)
MCV RBC AUTO: 87.8 FL — SIGNIFICANT CHANGE UP (ref 81–99)
MONOCYTES # BLD AUTO: 0.54 K/UL — SIGNIFICANT CHANGE UP (ref 0.1–0.6)
MONOCYTES NFR BLD AUTO: 6 % — SIGNIFICANT CHANGE UP (ref 1.7–9.3)
NEUTROPHILS # BLD AUTO: 6.67 K/UL — HIGH (ref 1.4–6.5)
NEUTROPHILS NFR BLD AUTO: 74.6 % — SIGNIFICANT CHANGE UP (ref 42.2–75.2)
NRBC BLD AUTO-RTO: 0 /100 WBCS — SIGNIFICANT CHANGE UP (ref 0–0)
PLATELET # BLD AUTO: 352 K/UL — SIGNIFICANT CHANGE UP (ref 130–400)
PMV BLD: 10.3 FL — SIGNIFICANT CHANGE UP (ref 7.4–10.4)
POTASSIUM SERPL-MCNC: 4.3 MMOL/L — SIGNIFICANT CHANGE UP (ref 3.5–5)
POTASSIUM SERPL-SCNC: 4.3 MMOL/L — SIGNIFICANT CHANGE UP (ref 3.5–5)
PROT SERPL-MCNC: 4.7 G/DL — LOW (ref 6–8)
RBC # BLD: 3.04 M/UL — LOW (ref 4.2–5.4)
RBC # FLD: 14.6 % — HIGH (ref 11.5–14.5)
SODIUM SERPL-SCNC: 139 MMOL/L — SIGNIFICANT CHANGE UP (ref 135–146)
WBC # BLD: 8.95 K/UL — SIGNIFICANT CHANGE UP (ref 4.8–10.8)
WBC # FLD AUTO: 8.95 K/UL — SIGNIFICANT CHANGE UP (ref 4.8–10.8)

## 2025-09-02 PROCEDURE — 99232 SBSQ HOSP IP/OBS MODERATE 35: CPT

## 2025-09-02 RX ORDER — MAGNESIUM SULFATE 500 MG/ML
2 SYRINGE (ML) INJECTION ONCE
Refills: 0 | Status: COMPLETED | OUTPATIENT
Start: 2025-09-02 | End: 2025-09-02

## 2025-09-02 RX ORDER — VANCOMYCIN HCL IN 5 % DEXTROSE 1.5G/250ML
125 PLASTIC BAG, INJECTION (ML) INTRAVENOUS
Refills: 0 | Status: DISCONTINUED | OUTPATIENT
Start: 2025-09-02 | End: 2025-09-05

## 2025-09-02 RX ADMIN — CINACALCET 30 MILLIGRAM(S): 30 TABLET, FILM COATED ORAL at 11:09

## 2025-09-02 RX ADMIN — Medication 1 APPLICATION(S): at 05:11

## 2025-09-02 RX ADMIN — IPRATROPIUM BROMIDE AND ALBUTEROL SULFATE 3 MILLILITER(S): .5; 2.5 SOLUTION RESPIRATORY (INHALATION) at 19:45

## 2025-09-02 RX ADMIN — Medication 125 MILLIGRAM(S): at 08:47

## 2025-09-02 RX ADMIN — Medication 400 MILLIGRAM(S): at 17:17

## 2025-09-02 RX ADMIN — Medication 1 APPLICATION(S): at 17:20

## 2025-09-02 RX ADMIN — Medication 1 PACKET(S): at 05:10

## 2025-09-02 RX ADMIN — Medication 25 GRAM(S): at 14:23

## 2025-09-02 RX ADMIN — Medication 1 PACKET(S): at 11:08

## 2025-09-02 RX ADMIN — Medication 125 MILLIGRAM(S): at 17:18

## 2025-09-02 RX ADMIN — Medication 15 MILLILITER(S): at 17:19

## 2025-09-02 RX ADMIN — IPRATROPIUM BROMIDE AND ALBUTEROL SULFATE 3 MILLILITER(S): .5; 2.5 SOLUTION RESPIRATORY (INHALATION) at 13:41

## 2025-09-02 RX ADMIN — Medication 40 MILLIGRAM(S): at 05:10

## 2025-09-02 RX ADMIN — SODIUM CHLORIDE 75 MILLILITER(S): 9 INJECTION, SOLUTION INTRAVENOUS at 21:27

## 2025-09-02 RX ADMIN — Medication 400 MILLIGRAM(S): at 11:09

## 2025-09-02 RX ADMIN — TOUCHLESS CARE ZINC OXIDE PROTECTANT 1 APPLICATION(S): 20; 25 SPRAY TOPICAL at 05:11

## 2025-09-02 RX ADMIN — Medication 400 MILLIGRAM(S): at 08:50

## 2025-09-02 RX ADMIN — IPRATROPIUM BROMIDE AND ALBUTEROL SULFATE 3 MILLILITER(S): .5; 2.5 SOLUTION RESPIRATORY (INHALATION) at 07:54

## 2025-09-02 RX ADMIN — Medication 1 PACKET(S): at 17:17

## 2025-09-02 RX ADMIN — CINACALCET 30 MILLIGRAM(S): 30 TABLET, FILM COATED ORAL at 23:35

## 2025-09-02 RX ADMIN — TOUCHLESS CARE ZINC OXIDE PROTECTANT 1 APPLICATION(S): 20; 25 SPRAY TOPICAL at 17:20

## 2025-09-02 RX ADMIN — Medication 15 MILLILITER(S): at 05:10

## 2025-09-02 RX ADMIN — SODIUM CHLORIDE 75 MILLILITER(S): 9 INJECTION, SOLUTION INTRAVENOUS at 08:51

## 2025-09-02 RX ADMIN — ENOXAPARIN SODIUM 70 MILLIGRAM(S): 100 INJECTION SUBCUTANEOUS at 23:35

## 2025-09-02 RX ADMIN — Medication 25 GRAM(S): at 16:18

## 2025-09-02 RX ADMIN — MEROPENEM 100 MILLIGRAM(S): 1 INJECTION INTRAVENOUS at 05:12

## 2025-09-02 RX ADMIN — Medication 40 MILLIGRAM(S): at 17:19

## 2025-09-02 RX ADMIN — IPRATROPIUM BROMIDE AND ALBUTEROL SULFATE 3 MILLILITER(S): .5; 2.5 SOLUTION RESPIRATORY (INHALATION) at 02:05

## 2025-09-02 RX ADMIN — MEROPENEM 100 MILLIGRAM(S): 1 INJECTION INTRAVENOUS at 17:19

## 2025-09-02 RX ADMIN — ENOXAPARIN SODIUM 70 MILLIGRAM(S): 100 INJECTION SUBCUTANEOUS at 11:07

## 2025-09-02 RX ADMIN — Medication 4 GRAM(S): at 08:50

## 2025-09-02 RX ADMIN — Medication 1 PACKET(S): at 23:35

## 2025-09-03 LAB
ALBUMIN SERPL ELPH-MCNC: 1.8 G/DL — LOW (ref 3.5–5.2)
ALP SERPL-CCNC: 97 U/L — SIGNIFICANT CHANGE UP (ref 30–115)
ALT FLD-CCNC: 8 U/L — SIGNIFICANT CHANGE UP (ref 0–41)
ANION GAP SERPL CALC-SCNC: 8 MMOL/L — SIGNIFICANT CHANGE UP (ref 7–14)
AST SERPL-CCNC: 11 U/L — SIGNIFICANT CHANGE UP (ref 0–41)
BASOPHILS # BLD AUTO: 0.03 K/UL — SIGNIFICANT CHANGE UP (ref 0–0.2)
BASOPHILS NFR BLD AUTO: 0.4 % — SIGNIFICANT CHANGE UP (ref 0–1)
BILIRUB SERPL-MCNC: 0.3 MG/DL — SIGNIFICANT CHANGE UP (ref 0.2–1.2)
BUN SERPL-MCNC: 16 MG/DL — SIGNIFICANT CHANGE UP (ref 10–20)
CALCIUM SERPL-MCNC: 7.6 MG/DL — LOW (ref 8.4–10.5)
CHLORIDE SERPL-SCNC: 105 MMOL/L — SIGNIFICANT CHANGE UP (ref 98–110)
CO2 SERPL-SCNC: 23 MMOL/L — SIGNIFICANT CHANGE UP (ref 17–32)
CREAT SERPL-MCNC: <0.5 MG/DL — LOW (ref 0.7–1.5)
EGFR: 105 ML/MIN/1.73M2 — SIGNIFICANT CHANGE UP
EGFR: 105 ML/MIN/1.73M2 — SIGNIFICANT CHANGE UP
EOSINOPHIL # BLD AUTO: 0.18 K/UL — SIGNIFICANT CHANGE UP (ref 0–0.7)
EOSINOPHIL NFR BLD AUTO: 2.3 % — SIGNIFICANT CHANGE UP (ref 0–8)
GLUCOSE BLDC GLUCOMTR-MCNC: 100 MG/DL — HIGH (ref 70–99)
GLUCOSE BLDC GLUCOMTR-MCNC: 104 MG/DL — HIGH (ref 70–99)
GLUCOSE BLDC GLUCOMTR-MCNC: 123 MG/DL — HIGH (ref 70–99)
GLUCOSE SERPL-MCNC: 88 MG/DL — SIGNIFICANT CHANGE UP (ref 70–99)
HCT VFR BLD CALC: 24.4 % — LOW (ref 37–47)
HCT VFR BLD CALC: 25.4 % — LOW (ref 37–47)
HGB BLD-MCNC: 7.7 G/DL — LOW (ref 12–16)
HGB BLD-MCNC: 8.1 G/DL — LOW (ref 12–16)
IMM GRANULOCYTES NFR BLD AUTO: 1.1 % — HIGH (ref 0.1–0.3)
LYMPHOCYTES # BLD AUTO: 1.4 K/UL — SIGNIFICANT CHANGE UP (ref 1.2–3.4)
LYMPHOCYTES # BLD AUTO: 17.5 % — LOW (ref 20.5–51.1)
MAGNESIUM SERPL-MCNC: 1.9 MG/DL — SIGNIFICANT CHANGE UP (ref 1.8–2.4)
MCHC RBC-ENTMCNC: 27.7 PG — SIGNIFICANT CHANGE UP (ref 27–31)
MCHC RBC-ENTMCNC: 27.9 PG — SIGNIFICANT CHANGE UP (ref 27–31)
MCHC RBC-ENTMCNC: 31.6 G/DL — LOW (ref 32–37)
MCHC RBC-ENTMCNC: 31.9 G/DL — LOW (ref 32–37)
MCV RBC AUTO: 87 FL — SIGNIFICANT CHANGE UP (ref 81–99)
MCV RBC AUTO: 88.4 FL — SIGNIFICANT CHANGE UP (ref 81–99)
MONOCYTES # BLD AUTO: 0.54 K/UL — SIGNIFICANT CHANGE UP (ref 0.1–0.6)
MONOCYTES NFR BLD AUTO: 6.8 % — SIGNIFICANT CHANGE UP (ref 1.7–9.3)
NEUTROPHILS # BLD AUTO: 5.76 K/UL — SIGNIFICANT CHANGE UP (ref 1.4–6.5)
NEUTROPHILS NFR BLD AUTO: 71.9 % — SIGNIFICANT CHANGE UP (ref 42.2–75.2)
NRBC BLD AUTO-RTO: 0 /100 WBCS — SIGNIFICANT CHANGE UP (ref 0–0)
NRBC BLD AUTO-RTO: 0 /100 WBCS — SIGNIFICANT CHANGE UP (ref 0–0)
PLATELET # BLD AUTO: 315 K/UL — SIGNIFICANT CHANGE UP (ref 130–400)
PLATELET # BLD AUTO: 361 K/UL — SIGNIFICANT CHANGE UP (ref 130–400)
PMV BLD: 10.4 FL — SIGNIFICANT CHANGE UP (ref 7.4–10.4)
PMV BLD: 10.6 FL — HIGH (ref 7.4–10.4)
POTASSIUM SERPL-MCNC: 4.6 MMOL/L — SIGNIFICANT CHANGE UP (ref 3.5–5)
POTASSIUM SERPL-SCNC: 4.6 MMOL/L — SIGNIFICANT CHANGE UP (ref 3.5–5)
PROT SERPL-MCNC: 4.4 G/DL — LOW (ref 6–8)
RBC # BLD: 2.76 M/UL — LOW (ref 4.2–5.4)
RBC # BLD: 2.92 M/UL — LOW (ref 4.2–5.4)
RBC # FLD: 14.6 % — HIGH (ref 11.5–14.5)
RBC # FLD: 14.6 % — HIGH (ref 11.5–14.5)
SODIUM SERPL-SCNC: 136 MMOL/L — SIGNIFICANT CHANGE UP (ref 135–146)
WBC # BLD: 8 K/UL — SIGNIFICANT CHANGE UP (ref 4.8–10.8)
WBC # BLD: 8.59 K/UL — SIGNIFICANT CHANGE UP (ref 4.8–10.8)
WBC # FLD AUTO: 8 K/UL — SIGNIFICANT CHANGE UP (ref 4.8–10.8)
WBC # FLD AUTO: 8.59 K/UL — SIGNIFICANT CHANGE UP (ref 4.8–10.8)

## 2025-09-03 PROCEDURE — 99232 SBSQ HOSP IP/OBS MODERATE 35: CPT

## 2025-09-03 RX ADMIN — MEROPENEM 100 MILLIGRAM(S): 1 INJECTION INTRAVENOUS at 18:05

## 2025-09-03 RX ADMIN — Medication 15 MILLILITER(S): at 18:05

## 2025-09-03 RX ADMIN — TOUCHLESS CARE ZINC OXIDE PROTECTANT 1 APPLICATION(S): 20; 25 SPRAY TOPICAL at 18:06

## 2025-09-03 RX ADMIN — IPRATROPIUM BROMIDE AND ALBUTEROL SULFATE 3 MILLILITER(S): .5; 2.5 SOLUTION RESPIRATORY (INHALATION) at 07:56

## 2025-09-03 RX ADMIN — IPRATROPIUM BROMIDE AND ALBUTEROL SULFATE 3 MILLILITER(S): .5; 2.5 SOLUTION RESPIRATORY (INHALATION) at 19:42

## 2025-09-03 RX ADMIN — Medication 400 MILLIGRAM(S): at 12:08

## 2025-09-03 RX ADMIN — Medication 125 MILLIGRAM(S): at 18:05

## 2025-09-03 RX ADMIN — TOUCHLESS CARE ZINC OXIDE PROTECTANT 1 APPLICATION(S): 20; 25 SPRAY TOPICAL at 06:12

## 2025-09-03 RX ADMIN — ENOXAPARIN SODIUM 70 MILLIGRAM(S): 100 INJECTION SUBCUTANEOUS at 12:07

## 2025-09-03 RX ADMIN — ENOXAPARIN SODIUM 70 MILLIGRAM(S): 100 INJECTION SUBCUTANEOUS at 23:48

## 2025-09-03 RX ADMIN — Medication 40 MILLIGRAM(S): at 06:11

## 2025-09-03 RX ADMIN — Medication 40 MILLIGRAM(S): at 18:05

## 2025-09-03 RX ADMIN — Medication 400 MILLIGRAM(S): at 18:05

## 2025-09-03 RX ADMIN — Medication 1 APPLICATION(S): at 06:11

## 2025-09-03 RX ADMIN — Medication 4 GRAM(S): at 09:52

## 2025-09-03 RX ADMIN — MEROPENEM 100 MILLIGRAM(S): 1 INJECTION INTRAVENOUS at 06:06

## 2025-09-03 RX ADMIN — IPRATROPIUM BROMIDE AND ALBUTEROL SULFATE 3 MILLILITER(S): .5; 2.5 SOLUTION RESPIRATORY (INHALATION) at 13:57

## 2025-09-03 RX ADMIN — CINACALCET 30 MILLIGRAM(S): 30 TABLET, FILM COATED ORAL at 12:08

## 2025-09-03 RX ADMIN — Medication 125 MILLIGRAM(S): at 06:11

## 2025-09-03 RX ADMIN — CINACALCET 30 MILLIGRAM(S): 30 TABLET, FILM COATED ORAL at 23:48

## 2025-09-03 RX ADMIN — Medication 1 APPLICATION(S): at 06:12

## 2025-09-03 RX ADMIN — SODIUM CHLORIDE 75 MILLILITER(S): 9 INJECTION, SOLUTION INTRAVENOUS at 23:45

## 2025-09-03 RX ADMIN — Medication 1 PACKET(S): at 18:05

## 2025-09-03 RX ADMIN — Medication 1 PACKET(S): at 12:08

## 2025-09-03 RX ADMIN — Medication 1 PACKET(S): at 06:11

## 2025-09-03 RX ADMIN — Medication 1 PACKET(S): at 23:48

## 2025-09-03 RX ADMIN — SODIUM CHLORIDE 75 MILLILITER(S): 9 INJECTION, SOLUTION INTRAVENOUS at 09:52

## 2025-09-03 RX ADMIN — Medication 15 MILLILITER(S): at 06:10

## 2025-09-03 RX ADMIN — Medication 400 MILLIGRAM(S): at 09:52

## 2025-09-03 RX ADMIN — IPRATROPIUM BROMIDE AND ALBUTEROL SULFATE 3 MILLILITER(S): .5; 2.5 SOLUTION RESPIRATORY (INHALATION) at 01:32

## 2025-09-03 RX ADMIN — Medication 1 APPLICATION(S): at 18:06

## 2025-09-04 ENCOUNTER — APPOINTMENT (OUTPATIENT)
Dept: ENDOCRINOLOGY | Facility: CLINIC | Age: 75
End: 2025-09-04

## 2025-09-04 LAB — GLUCOSE BLDC GLUCOMTR-MCNC: 124 MG/DL — HIGH (ref 70–99)

## 2025-09-04 PROCEDURE — 99232 SBSQ HOSP IP/OBS MODERATE 35: CPT

## 2025-09-04 RX ADMIN — IPRATROPIUM BROMIDE AND ALBUTEROL SULFATE 3 MILLILITER(S): .5; 2.5 SOLUTION RESPIRATORY (INHALATION) at 07:45

## 2025-09-04 RX ADMIN — Medication 400 MILLIGRAM(S): at 17:42

## 2025-09-04 RX ADMIN — Medication 125 MILLIGRAM(S): at 05:50

## 2025-09-04 RX ADMIN — Medication 400 MILLIGRAM(S): at 08:15

## 2025-09-04 RX ADMIN — TOUCHLESS CARE ZINC OXIDE PROTECTANT 1 APPLICATION(S): 20; 25 SPRAY TOPICAL at 05:52

## 2025-09-04 RX ADMIN — Medication 125 MILLIGRAM(S): at 17:42

## 2025-09-04 RX ADMIN — TOUCHLESS CARE ZINC OXIDE PROTECTANT 1 APPLICATION(S): 20; 25 SPRAY TOPICAL at 17:53

## 2025-09-04 RX ADMIN — SODIUM CHLORIDE 75 MILLILITER(S): 9 INJECTION, SOLUTION INTRAVENOUS at 17:43

## 2025-09-04 RX ADMIN — IPRATROPIUM BROMIDE AND ALBUTEROL SULFATE 3 MILLILITER(S): .5; 2.5 SOLUTION RESPIRATORY (INHALATION) at 01:59

## 2025-09-04 RX ADMIN — Medication 1 APPLICATION(S): at 05:51

## 2025-09-04 RX ADMIN — IPRATROPIUM BROMIDE AND ALBUTEROL SULFATE 3 MILLILITER(S): .5; 2.5 SOLUTION RESPIRATORY (INHALATION) at 19:35

## 2025-09-04 RX ADMIN — Medication 1 PACKET(S): at 17:42

## 2025-09-04 RX ADMIN — Medication 400 MILLIGRAM(S): at 12:00

## 2025-09-04 RX ADMIN — IPRATROPIUM BROMIDE AND ALBUTEROL SULFATE 3 MILLILITER(S): .5; 2.5 SOLUTION RESPIRATORY (INHALATION) at 15:45

## 2025-09-04 RX ADMIN — Medication 1 APPLICATION(S): at 05:52

## 2025-09-04 RX ADMIN — Medication 15 MILLILITER(S): at 17:52

## 2025-09-04 RX ADMIN — Medication 1 PACKET(S): at 05:46

## 2025-09-04 RX ADMIN — Medication 40 MILLIGRAM(S): at 05:46

## 2025-09-04 RX ADMIN — Medication 4 GRAM(S): at 08:15

## 2025-09-04 RX ADMIN — Medication 1 APPLICATION(S): at 17:53

## 2025-09-04 RX ADMIN — ENOXAPARIN SODIUM 70 MILLIGRAM(S): 100 INJECTION SUBCUTANEOUS at 12:01

## 2025-09-04 RX ADMIN — Medication 1 PACKET(S): at 12:00

## 2025-09-04 RX ADMIN — MEROPENEM 100 MILLIGRAM(S): 1 INJECTION INTRAVENOUS at 05:45

## 2025-09-04 RX ADMIN — MEROPENEM 100 MILLIGRAM(S): 1 INJECTION INTRAVENOUS at 17:42

## 2025-09-04 RX ADMIN — Medication 40 MILLIGRAM(S): at 17:41

## 2025-09-05 ENCOUNTER — TRANSCRIPTION ENCOUNTER (OUTPATIENT)
Age: 75
End: 2025-09-05

## 2025-09-05 VITALS
SYSTOLIC BLOOD PRESSURE: 102 MMHG | DIASTOLIC BLOOD PRESSURE: 54 MMHG | TEMPERATURE: 98 F | OXYGEN SATURATION: 100 % | HEART RATE: 81 BPM | RESPIRATION RATE: 18 BRPM

## 2025-09-05 PROBLEM — I48.91 UNSPECIFIED ATRIAL FIBRILLATION: Chronic | Status: ACTIVE | Noted: 2025-08-10

## 2025-09-05 PROBLEM — Z98.890 OTHER SPECIFIED POSTPROCEDURAL STATES: Chronic | Status: ACTIVE | Noted: 2025-08-10

## 2025-09-05 PROBLEM — J44.9 CHRONIC OBSTRUCTIVE PULMONARY DISEASE, UNSPECIFIED: Chronic | Status: ACTIVE | Noted: 2025-08-10

## 2025-09-05 PROBLEM — Z93.1 GASTROSTOMY STATUS: Chronic | Status: ACTIVE | Noted: 2025-08-10

## 2025-09-05 PROBLEM — Z86.39 PERSONAL HISTORY OF OTHER ENDOCRINE, NUTRITIONAL AND METABOLIC DISEASE: Chronic | Status: ACTIVE | Noted: 2025-08-10

## 2025-09-05 PROBLEM — Q60.0 RENAL AGENESIS, UNILATERAL: Chronic | Status: ACTIVE | Noted: 2025-08-10

## 2025-09-05 PROBLEM — Z93.0 TRACHEOSTOMY STATUS: Chronic | Status: ACTIVE | Noted: 2025-08-10

## 2025-09-05 PROCEDURE — 99239 HOSP IP/OBS DSCHRG MGMT >30: CPT

## 2025-09-05 RX ADMIN — ENOXAPARIN SODIUM 70 MILLIGRAM(S): 100 INJECTION SUBCUTANEOUS at 12:26

## 2025-09-05 RX ADMIN — Medication 1 APPLICATION(S): at 17:33

## 2025-09-05 RX ADMIN — Medication 1 PACKET(S): at 07:00

## 2025-09-05 RX ADMIN — MEROPENEM 100 MILLIGRAM(S): 1 INJECTION INTRAVENOUS at 16:07

## 2025-09-05 RX ADMIN — MEROPENEM 100 MILLIGRAM(S): 1 INJECTION INTRAVENOUS at 07:00

## 2025-09-05 RX ADMIN — Medication 1 APPLICATION(S): at 07:12

## 2025-09-05 RX ADMIN — Medication 40 MILLIGRAM(S): at 17:29

## 2025-09-05 RX ADMIN — Medication 15 MILLILITER(S): at 07:00

## 2025-09-05 RX ADMIN — Medication 1 PACKET(S): at 12:26

## 2025-09-05 RX ADMIN — Medication 125 MILLIGRAM(S): at 07:00

## 2025-09-05 RX ADMIN — Medication 400 MILLIGRAM(S): at 12:27

## 2025-09-05 RX ADMIN — TOUCHLESS CARE ZINC OXIDE PROTECTANT 1 APPLICATION(S): 20; 25 SPRAY TOPICAL at 17:33

## 2025-09-05 RX ADMIN — Medication 1 APPLICATION(S): at 07:13

## 2025-09-05 RX ADMIN — CINACALCET 30 MILLIGRAM(S): 30 TABLET, FILM COATED ORAL at 00:42

## 2025-09-05 RX ADMIN — IPRATROPIUM BROMIDE AND ALBUTEROL SULFATE 3 MILLILITER(S): .5; 2.5 SOLUTION RESPIRATORY (INHALATION) at 01:19

## 2025-09-05 RX ADMIN — TOUCHLESS CARE ZINC OXIDE PROTECTANT 1 APPLICATION(S): 20; 25 SPRAY TOPICAL at 07:13

## 2025-09-05 RX ADMIN — Medication 400 MILLIGRAM(S): at 17:29

## 2025-09-05 RX ADMIN — IPRATROPIUM BROMIDE AND ALBUTEROL SULFATE 3 MILLILITER(S): .5; 2.5 SOLUTION RESPIRATORY (INHALATION) at 13:34

## 2025-09-05 RX ADMIN — Medication 4 GRAM(S): at 08:39

## 2025-09-05 RX ADMIN — ENOXAPARIN SODIUM 70 MILLIGRAM(S): 100 INJECTION SUBCUTANEOUS at 00:42

## 2025-09-05 RX ADMIN — Medication 1 PACKET(S): at 00:42

## 2025-09-05 RX ADMIN — Medication 15 MILLILITER(S): at 17:33

## 2025-09-05 RX ADMIN — Medication 1 PACKET(S): at 17:28

## 2025-09-05 RX ADMIN — Medication 125 MILLIGRAM(S): at 17:28

## 2025-09-05 RX ADMIN — Medication 400 MILLIGRAM(S): at 08:39

## 2025-09-05 RX ADMIN — SODIUM CHLORIDE 75 MILLILITER(S): 9 INJECTION, SOLUTION INTRAVENOUS at 07:04

## 2025-09-05 RX ADMIN — Medication 40 MILLIGRAM(S): at 07:00

## 2025-09-05 RX ADMIN — IPRATROPIUM BROMIDE AND ALBUTEROL SULFATE 3 MILLILITER(S): .5; 2.5 SOLUTION RESPIRATORY (INHALATION) at 09:05

## 2025-09-09 DIAGNOSIS — I48.20 CHRONIC ATRIAL FIBRILLATION, UNSPECIFIED: ICD-10-CM

## 2025-09-09 DIAGNOSIS — B34.8 OTHER VIRAL INFECTIONS OF UNSPECIFIED SITE: ICD-10-CM

## 2025-09-09 DIAGNOSIS — E21.0 PRIMARY HYPERPARATHYROIDISM: ICD-10-CM

## 2025-09-09 DIAGNOSIS — I48.0 PAROXYSMAL ATRIAL FIBRILLATION: ICD-10-CM

## 2025-09-09 DIAGNOSIS — K21.9 GASTRO-ESOPHAGEAL REFLUX DISEASE WITHOUT ESOPHAGITIS: ICD-10-CM
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
## 2025-09-09 DIAGNOSIS — R65.10 SYSTEMIC INFLAMMATORY RESPONSE SYNDROME (SIRS) OF NON-INFECTIOUS ORIGIN WITHOUT ACUTE ORGAN DYSFUNCTION: ICD-10-CM

## 2025-09-09 DIAGNOSIS — N39.0 URINARY TRACT INFECTION, SITE NOT SPECIFIED: ICD-10-CM

## 2025-09-09 DIAGNOSIS — Z79.84 LONG TERM (CURRENT) USE OF ORAL HYPOGLYCEMIC DRUGS: ICD-10-CM

## 2025-09-09 DIAGNOSIS — R57.9 SHOCK, UNSPECIFIED: ICD-10-CM

## 2025-09-09 DIAGNOSIS — Z79.899 OTHER LONG TERM (CURRENT) DRUG THERAPY: ICD-10-CM

## 2025-09-09 DIAGNOSIS — I31.39 OTHER PERICARDIAL EFFUSION (NONINFLAMMATORY): ICD-10-CM
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
## 2025-09-09 DIAGNOSIS — J96.11 CHRONIC RESPIRATORY FAILURE WITH HYPOXIA: ICD-10-CM

## 2025-09-09 DIAGNOSIS — K92.1 MELENA: ICD-10-CM

## 2025-09-09 DIAGNOSIS — B95.2 ENTEROCOCCUS AS THE CAUSE OF DISEASES CLASSIFIED ELSEWHERE: ICD-10-CM

## 2025-09-09 DIAGNOSIS — Z16.12 EXTENDED SPECTRUM BETA LACTAMASE (ESBL) RESISTANCE: ICD-10-CM

## 2025-09-09 DIAGNOSIS — K94.23 GASTROSTOMY MALFUNCTION: ICD-10-CM

## 2025-09-09 DIAGNOSIS — Z79.01 LONG TERM (CURRENT) USE OF ANTICOAGULANTS: ICD-10-CM

## 2025-09-09 DIAGNOSIS — Q60.0 RENAL AGENESIS, UNILATERAL: ICD-10-CM

## 2025-09-09 DIAGNOSIS — B96.20 UNSPECIFIED ESCHERICHIA COLI [E. COLI] AS THE CAUSE OF DISEASES CLASSIFIED ELSEWHERE: ICD-10-CM

## 2025-09-09 DIAGNOSIS — I10 ESSENTIAL (PRIMARY) HYPERTENSION: ICD-10-CM

## 2025-09-09 DIAGNOSIS — J44.9 CHRONIC OBSTRUCTIVE PULMONARY DISEASE, UNSPECIFIED: ICD-10-CM

## 2025-09-09 DIAGNOSIS — E11.9 TYPE 2 DIABETES MELLITUS WITHOUT COMPLICATIONS: ICD-10-CM

## 2025-09-09 DIAGNOSIS — D84.81 IMMUNODEFICIENCY DUE TO CONDITIONS CLASSIFIED ELSEWHERE: ICD-10-CM

## 2025-09-09 DIAGNOSIS — I45.19 OTHER RIGHT BUNDLE-BRANCH BLOCK: ICD-10-CM

## 2025-09-09 DIAGNOSIS — Z93.0 TRACHEOSTOMY STATUS: ICD-10-CM

## 2025-09-09 DIAGNOSIS — E87.0 HYPEROSMOLALITY AND HYPERNATREMIA: ICD-10-CM

## 2025-09-09 DIAGNOSIS — T85.518A BREAKDOWN (MECHANICAL) OF OTHER GASTROINTESTINAL PROSTHETIC DEVICES, IMPLANTS AND GRAFTS, INITIAL ENCOUNTER: ICD-10-CM

## 2025-09-09 DIAGNOSIS — D64.9 ANEMIA, UNSPECIFIED: ICD-10-CM

## 2025-09-09 DIAGNOSIS — A04.72 ENTEROCOLITIS DUE TO CLOSTRIDIUM DIFFICILE, NOT SPECIFIED AS RECURRENT: ICD-10-CM

## 2025-09-09 DIAGNOSIS — E61.2 MAGNESIUM DEFICIENCY: ICD-10-CM
